# Patient Record
Sex: FEMALE | Race: WHITE | Employment: OTHER | URBAN - METROPOLITAN AREA
[De-identification: names, ages, dates, MRNs, and addresses within clinical notes are randomized per-mention and may not be internally consistent; named-entity substitution may affect disease eponyms.]

---

## 2017-03-23 ENCOUNTER — HOSPITAL ENCOUNTER (OUTPATIENT)
Dept: NUCLEAR MEDICINE | Age: 52
Discharge: HOME | End: 2017-03-23
Attending: INTERNAL MEDICINE | Admitting: INTERNAL MEDICINE

## 2017-03-23 DIAGNOSIS — R10.11 RIGHT UPPER QUADRANT PAIN: ICD-10-CM

## 2017-03-23 DIAGNOSIS — R10.11 ABDOMINAL PAIN, RIGHT UPPER QUADRANT: ICD-10-CM

## 2017-03-23 RX ADMIN — Medication 1 MILLICURIE: at 07:51

## 2017-04-27 ENCOUNTER — TELEPHONE (OUTPATIENT)
Dept: CARDIOTHORACIC SURGERY | Age: 52
End: 2017-04-27

## 2017-04-27 ENCOUNTER — HOSPITAL ENCOUNTER (OUTPATIENT)
Dept: OTHER | Age: 52
Discharge: HOME | End: 2017-04-27
Attending: PODIATRIST | Admitting: PODIATRIST

## 2017-04-27 DIAGNOSIS — R52 PAIN: ICD-10-CM

## 2017-08-01 ENCOUNTER — HOSPITAL ENCOUNTER (OUTPATIENT)
Dept: OTHER | Age: 52
Discharge: HOME | End: 2017-08-01
Attending: PSYCHIATRY & NEUROLOGY | Admitting: PSYCHIATRY & NEUROLOGY

## 2017-08-01 LAB
A/G RATIO: 1 (ref 1.1–2.2)
ALBUMIN SERPL-MCNC: 3.7 G/DL (ref 3.4–5)
ALP BLD-CCNC: 102 U/L (ref 40–129)
ALT SERPL-CCNC: 9 U/L (ref 10–40)
AMPHETAMINE SCREEN, URINE: NORMAL
ANION GAP SERPL CALCULATED.3IONS-SCNC: 17 MMOL/L (ref 3–16)
AST SERPL-CCNC: 13 U/L (ref 15–37)
BARBITURATE SCREEN URINE: NORMAL
BASOPHILS ABSOLUTE: 0 K/UL (ref 0–0.2)
BASOPHILS RELATIVE PERCENT: 0.5 %
BENZODIAZEPINE SCREEN, URINE: NORMAL
BILIRUB SERPL-MCNC: 0.3 MG/DL (ref 0–1)
BUN BLDV-MCNC: 6 MG/DL (ref 7–20)
CALCIUM SERPL-MCNC: 9.7 MG/DL (ref 8.3–10.6)
CANNABINOID SCREEN URINE: NORMAL
CHLORIDE BLD-SCNC: 93 MMOL/L (ref 99–110)
CHOLESTEROL, TOTAL: 266 MG/DL (ref 0–199)
CO2: 28 MMOL/L (ref 21–32)
COCAINE METABOLITE SCREEN URINE: NORMAL
CREAT SERPL-MCNC: <0.5 MG/DL (ref 0.6–1.1)
EOSINOPHILS ABSOLUTE: 0.3 K/UL (ref 0–0.6)
EOSINOPHILS RELATIVE PERCENT: 3.3 %
GFR AFRICAN AMERICAN: >60
GFR NON-AFRICAN AMERICAN: >60
GLOBULIN: 3.7 G/DL
GLUCOSE BLD-MCNC: 352 MG/DL (ref 70–99)
HCT VFR BLD CALC: 48.2 % (ref 36–48)
HDLC SERPL-MCNC: 41 MG/DL (ref 40–60)
HEMOGLOBIN: 16.1 G/DL (ref 12–16)
LDL CHOLESTEROL CALCULATED: ABNORMAL MG/DL
LDL CHOLESTEROL DIRECT: 187 MG/DL
LYMPHOCYTES ABSOLUTE: 2.3 K/UL (ref 1–5.1)
LYMPHOCYTES RELATIVE PERCENT: 27.9 %
Lab: NORMAL
MCH RBC QN AUTO: 28.2 PG (ref 26–34)
MCHC RBC AUTO-ENTMCNC: 33.3 G/DL (ref 31–36)
MCV RBC AUTO: 84.5 FL (ref 80–100)
METHADONE SCREEN, URINE: NORMAL
MONOCYTES ABSOLUTE: 0.5 K/UL (ref 0–1.3)
MONOCYTES RELATIVE PERCENT: 5.8 %
NEUTROPHILS ABSOLUTE: 5.1 K/UL (ref 1.7–7.7)
NEUTROPHILS RELATIVE PERCENT: 62.5 %
OPIATE SCREEN URINE: NORMAL
OXYCODONE URINE: NORMAL
PDW BLD-RTO: 14.1 % (ref 12.4–15.4)
PH UA: 6.5
PHENCYCLIDINE SCREEN URINE: NORMAL
PLATELET # BLD: 289 K/UL (ref 135–450)
PMV BLD AUTO: 8.4 FL (ref 5–10.5)
POTASSIUM SERPL-SCNC: 3.8 MMOL/L (ref 3.5–5.1)
PROPOXYPHENE SCREEN: NORMAL
RBC # BLD: 5.7 M/UL (ref 4–5.2)
SODIUM BLD-SCNC: 138 MMOL/L (ref 136–145)
TOTAL PROTEIN: 7.4 G/DL (ref 6.4–8.2)
TRIGL SERPL-MCNC: 305 MG/DL (ref 0–150)
TSH SERPL DL<=0.05 MIU/L-ACNC: 0.11 UIU/ML (ref 0.27–4.2)
VLDLC SERPL CALC-MCNC: ABNORMAL MG/DL
WBC # BLD: 8.1 K/UL (ref 4–11)

## 2017-08-02 LAB
ESTIMATED AVERAGE GLUCOSE: 251.8 MG/DL
HBA1C MFR BLD: 10.4 %

## 2017-12-04 ENCOUNTER — OFFICE VISIT (OUTPATIENT)
Dept: ORTHOPEDIC SURGERY | Age: 52
End: 2017-12-04

## 2017-12-04 VITALS — HEIGHT: 64 IN | BODY MASS INDEX: 34.66 KG/M2 | WEIGHT: 203.04 LBS

## 2017-12-04 DIAGNOSIS — M54.12 CERVICAL RADICULITIS: ICD-10-CM

## 2017-12-04 DIAGNOSIS — M25.511 RIGHT SHOULDER PAIN, UNSPECIFIED CHRONICITY: Primary | ICD-10-CM

## 2017-12-04 DIAGNOSIS — M75.101 TEAR OF RIGHT ROTATOR CUFF, UNSPECIFIED TEAR EXTENT: ICD-10-CM

## 2017-12-04 PROCEDURE — 73030 X-RAY EXAM OF SHOULDER: CPT | Performed by: PHYSICIAN ASSISTANT

## 2017-12-04 PROCEDURE — 99243 OFF/OP CNSLTJ NEW/EST LOW 30: CPT | Performed by: PHYSICIAN ASSISTANT

## 2017-12-04 PROCEDURE — G8417 CALC BMI ABV UP PARAM F/U: HCPCS | Performed by: PHYSICIAN ASSISTANT

## 2017-12-04 PROCEDURE — 3017F COLORECTAL CA SCREEN DOC REV: CPT | Performed by: PHYSICIAN ASSISTANT

## 2017-12-04 PROCEDURE — G8427 DOCREV CUR MEDS BY ELIG CLIN: HCPCS | Performed by: PHYSICIAN ASSISTANT

## 2017-12-04 PROCEDURE — G8484 FLU IMMUNIZE NO ADMIN: HCPCS | Performed by: PHYSICIAN ASSISTANT

## 2017-12-04 PROCEDURE — 3014F SCREEN MAMMO DOC REV: CPT | Performed by: PHYSICIAN ASSISTANT

## 2017-12-04 RX ORDER — BUPRENORPHINE HYDROCHLORIDE AND NALOXONE HYDROCHLORIDE DIHYDRATE 8; 2 MG/1; MG/1
TABLET SUBLINGUAL
Refills: 0 | Status: ON HOLD | COMMUNITY
Start: 2017-11-30 | End: 2018-05-22 | Stop reason: SDDI

## 2017-12-04 NOTE — PROGRESS NOTES
REFERRING PHYSICIAN: Tierra Juárez PA-C    CHIEF COMPLAINT:  Right shoulder pain     HISTORY OF PRESENT ILLNESS:  Nic Ornelas is a 57-year-old right-hand-dominant female who presents today at the request of Tierra Juárez PA-C for evaluation consultation of her right shoulder. She has had 6 months of atraumatic right shoulder discomfort. She reports having pain at night is affecting her sleep, to the point that she has been sleeping in a chair. She also reports mild numbness and tingling into the 4th and 5th digits, mostly at night. She is treated this with heat and no medications. She is currently on Suboxone 2 times daily due to pain medication and alcohol addiction. She is in a pain management clinic for this therapy. 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 She has a history of a left rotator cuff repair with Dr. Renea Olmstead MD 2016. REVIEW OF SYSTEMS:  Pertinent items are noted in the HPI. Review of systems was reviewed from Patient History Form dated on December 4, 2017 and available in the patient's chart under the Media tab. PHYSICAL EXAMINATION: Inspection of the right shoulder reveals warm, dry, intact skin. There is no adenopathy. The distal neurovascular exam is grossly intact. There is tenderness about the acromioclavicular mild tenderness in the posterior and anterior aspects the shoulder. Range of motion reveals 90° of comfortable active forward elevation, 120° of total forward elevation. She has difficulty position her hand behind her head. She has 5° of external rotation with her arm at her side. She has 90° of shoulder abduction. She has 90° of external rotation and 10° of internal rotation with her arm maximally abducted. She has 5 minus out of 5 strength in forward elevation and external rotation.   She is discomfort and weakness with a Whipple's test.  She has discomfort and weakness with belly press test. She has discomfort and weakness with resisted supination. Sedation about the lateral aspect of the shoulder in an axillary nerve distribution pattern is mildly diminished. She is able to actively contract her deltoid. Examination of the contralateral shoulder reveals no atrophy or deformity. The skin is warm and dry. Range of motion is within normal limits. There is no focal tenderness with palpation. Provocative SLAP, biceps tension, apprehension AC joint or rotator cuff tests are negative. Strength is graded 5/5 in all muscle groups. The distal neurovascular exam is grossly intact. Cervical spine: The skin is warm and dry. There is no swelling, warmth, or erythema. Range of motion is limited in right and left rotation and right and left lateral flexion. Cervical range of motion is not produce any right shoulder symptoms. She reports increased radicular symptoms to her 4th and 5th digits with cervical range of motion. There is no paraspinal or spinous process tenderness. Spurling's sign is negative and did not produce shoulder pain. The distal neurovascular exam reveals decreased sensation throughout the hand. Hand  strength is slightly diminished when compared bilaterally. Finger abduction, extension, and thumb extension are all slightly diminished when compared bilaterally. Tinel sign at the elbow also increases radicular symptoms to the 4th and 5th digits. Tinel sign at the wrist is negative. X-RAYS: 4 views of the right shoulder were obtained in the office and reviewed today. The glenohumeral joint is well maintained. The acromioclavicular joint is well maintained. The acromiohumeral interval is diminished. There are changes about the undersurface of the lateral acromion and greater tuberosity. There is a type II acromion visualized in the outlet view. Lung fields are clear with normal pulmonary markings. ASSESSMENT:    1. Right shoulder possible rotator cuff tear  2.   Cervical radiculitis  3. Chronic Suboxone use    PLAN:  I had a detailed discussion with Vicky Moss and her  regarding diagnosis and treatment options. I recommended an MRI to evaluate the integrity of the rotator cuff. I also recommended a cervical spine evaluation by nonoperative spine specialist.  I will see her back for reevaluation following the MRI and cervical spine evaluation. She and her  are in agreement with this plan.     Patient seen and examined by Ira Ontiveros PA-C and Dr. Neela Camarena MD

## 2017-12-12 ENCOUNTER — HOSPITAL ENCOUNTER (OUTPATIENT)
Dept: MAMMOGRAPHY | Age: 52
Discharge: HOME | End: 2017-12-12
Attending: PHYSICIAN ASSISTANT | Admitting: PHYSICIAN ASSISTANT

## 2017-12-12 ENCOUNTER — TELEPHONE (OUTPATIENT)
Dept: ORTHOPEDIC SURGERY | Age: 52
End: 2017-12-12

## 2017-12-12 DIAGNOSIS — M75.101 TEAR OF RIGHT ROTATOR CUFF, UNSPECIFIED TEAR EXTENT: Primary | ICD-10-CM

## 2017-12-12 DIAGNOSIS — Z12.31 VISIT FOR SCREENING MAMMOGRAM: ICD-10-CM

## 2018-01-02 ENCOUNTER — TELEPHONE (OUTPATIENT)
Dept: ORTHOPEDIC SURGERY | Age: 53
End: 2018-01-02

## 2018-01-02 NOTE — TELEPHONE ENCOUNTER
We had her in therapy for 4-6 weeks due to MRI denial for lack of conservative care. We need her to make an appt with [facility] after 2/1/2018 to hopefully get MRI approved. Does she need more orders for PT at [facility]?

## 2018-05-23 PROBLEM — R55 POSTURAL DIZZINESS WITH PRESYNCOPE: Status: ACTIVE | Noted: 2018-05-23

## 2018-05-23 PROBLEM — R42 POSTURAL DIZZINESS WITH PRESYNCOPE: Status: ACTIVE | Noted: 2018-05-23

## 2018-05-23 PROBLEM — E86.9 VOLUME DEPLETION: Status: ACTIVE | Noted: 2018-05-23

## 2018-05-23 PROBLEM — E83.42 HYPOMAGNESEMIA: Status: ACTIVE | Noted: 2018-05-23

## 2018-05-23 PROBLEM — R73.9 ACUTE HYPERGLYCEMIA: Status: ACTIVE | Noted: 2018-05-23

## 2018-05-23 PROBLEM — I95.9 HYPOTENSION: Status: ACTIVE | Noted: 2018-05-23

## 2018-05-23 PROBLEM — R57.9 SHOCK (HCC): Status: ACTIVE | Noted: 2018-05-23

## 2018-05-23 PROBLEM — E87.8 HYPOCHLOREMIA: Status: ACTIVE | Noted: 2018-05-23

## 2018-05-23 PROBLEM — I50.9 CHF (CONGESTIVE HEART FAILURE) (HCC): Chronic | Status: ACTIVE | Noted: 2018-05-23

## 2018-05-23 PROBLEM — N17.9 AKI (ACUTE KIDNEY INJURY) (HCC): Status: ACTIVE | Noted: 2018-05-23

## 2018-05-23 PROBLEM — D64.9 NORMOCYTIC ANEMIA: Status: ACTIVE | Noted: 2018-05-23

## 2018-05-23 PROBLEM — E87.1 HYPONATREMIA: Status: ACTIVE | Noted: 2018-05-23

## 2018-05-24 PROBLEM — R57.0 CARDIOGENIC SHOCK (HCC): Status: ACTIVE | Noted: 2018-05-23

## 2018-05-24 PROBLEM — I34.0 MITRAL REGURGITATION: Chronic | Status: ACTIVE | Noted: 2018-05-24

## 2018-05-26 PROBLEM — I95.81 POSTPROCEDURAL HYPOTENSION: Status: ACTIVE | Noted: 2018-05-26

## 2018-05-27 PROBLEM — I95.81 POSTPROCEDURAL HYPOTENSION: Status: RESOLVED | Noted: 2018-05-26 | Resolved: 2018-05-27

## 2018-05-27 PROBLEM — I50.9 CHF (CONGESTIVE HEART FAILURE) (HCC): Chronic | Status: RESOLVED | Noted: 2018-05-23 | Resolved: 2018-05-27

## 2018-05-27 PROBLEM — N17.9 AKI (ACUTE KIDNEY INJURY) (HCC): Status: RESOLVED | Noted: 2018-05-23 | Resolved: 2018-05-27

## 2018-05-27 PROBLEM — E87.1 HYPONATREMIA: Status: RESOLVED | Noted: 2018-05-23 | Resolved: 2018-05-27

## 2018-05-27 PROBLEM — R57.9 SHOCK (HCC): Status: RESOLVED | Noted: 2018-05-23 | Resolved: 2018-05-27

## 2018-05-29 ENCOUNTER — TELEPHONE (OUTPATIENT)
Dept: CARDIOLOGY | Age: 53
End: 2018-05-29

## 2018-05-30 ENCOUNTER — TELEPHONE (OUTPATIENT)
Dept: CARDIOLOGY UNIT | Age: 53
End: 2018-05-30

## 2018-05-31 ENCOUNTER — TELEPHONE (OUTPATIENT)
Dept: TELEMETRY | Age: 53
End: 2018-05-31

## 2018-06-12 LAB
B-TYPE NATRIURETIC PEPTIDE: 801 PG/ML
BASOPHILS ABSOLUTE: 0.02 /ΜL
BASOPHILS RELATIVE PERCENT: 0 %
BUN BLDV-MCNC: 33 MG/DL
CALCIUM SERPL-MCNC: 8.2 MG/DL
CHLORIDE BLD-SCNC: 95 MMOL/L
CO2: 17 MMOL/L
CREAT SERPL-MCNC: 1.6 MG/DL
EOSINOPHILS ABSOLUTE: 0.22 /ΜL
EOSINOPHILS RELATIVE PERCENT: 3 %
GFR CALCULATED: 34
GLUCOSE BLD-MCNC: 126 MG/DL
HCT VFR BLD CALC: 32.2 % (ref 36–46)
HEMOGLOBIN: 10.3 G/DL (ref 12–16)
LYMPHOCYTES ABSOLUTE: 1.47 /ΜL
LYMPHOCYTES RELATIVE PERCENT: 17 %
MCH RBC QN AUTO: 28 PG
MCHC RBC AUTO-ENTMCNC: 32 G/DL
MCV RBC AUTO: 88 FL
MONOCYTES ABSOLUTE: 0.53 /ΜL
MONOCYTES RELATIVE PERCENT: 6 %
NEUTROPHILS ABSOLUTE: 6.49 /ΜL
NEUTROPHILS RELATIVE PERCENT: 74 %
PDW BLD-RTO: 15.4 %
PLATELET # BLD: 383 K/ΜL
PMV BLD AUTO: ABNORMAL FL
POTASSIUM SERPL-SCNC: 3.3 MMOL/L
RBC # BLD: 3.68 10^6/ΜL
SODIUM BLD-SCNC: 139 MMOL/L
TROPONIN: <0.017
WBC # BLD: 8.7 10^3/ML

## 2018-06-18 ENCOUNTER — OFFICE VISIT (OUTPATIENT)
Dept: CARDIOLOGY CLINIC | Age: 53
End: 2018-06-18

## 2018-06-18 VITALS
WEIGHT: 209.2 LBS | HEIGHT: 62 IN | BODY MASS INDEX: 38.5 KG/M2 | HEART RATE: 94 BPM | SYSTOLIC BLOOD PRESSURE: 130 MMHG | DIASTOLIC BLOOD PRESSURE: 76 MMHG | OXYGEN SATURATION: 96 %

## 2018-06-18 DIAGNOSIS — I50.21 ACUTE SYSTOLIC CHF (CONGESTIVE HEART FAILURE), NYHA CLASS 4 (HCC): ICD-10-CM

## 2018-06-18 DIAGNOSIS — I25.5 ISCHEMIC CARDIOMYOPATHY: ICD-10-CM

## 2018-06-18 DIAGNOSIS — E78.2 MIXED HYPERLIPIDEMIA: Chronic | ICD-10-CM

## 2018-06-18 DIAGNOSIS — I25.10 CORONARY ARTERY DISEASE INVOLVING NATIVE CORONARY ARTERY OF NATIVE HEART WITHOUT ANGINA PECTORIS: Primary | ICD-10-CM

## 2018-06-18 DIAGNOSIS — I73.9 PVD (PERIPHERAL VASCULAR DISEASE) (HCC): ICD-10-CM

## 2018-06-18 DIAGNOSIS — I34.0 MITRAL VALVE INSUFFICIENCY, UNSPECIFIED ETIOLOGY: Chronic | ICD-10-CM

## 2018-06-18 PROCEDURE — G8598 ASA/ANTIPLAT THER USED: HCPCS | Performed by: NURSE PRACTITIONER

## 2018-06-18 PROCEDURE — 3017F COLORECTAL CA SCREEN DOC REV: CPT | Performed by: NURSE PRACTITIONER

## 2018-06-18 PROCEDURE — 1111F DSCHRG MED/CURRENT MED MERGE: CPT | Performed by: NURSE PRACTITIONER

## 2018-06-18 PROCEDURE — G8427 DOCREV CUR MEDS BY ELIG CLIN: HCPCS | Performed by: NURSE PRACTITIONER

## 2018-06-18 PROCEDURE — G8417 CALC BMI ABV UP PARAM F/U: HCPCS | Performed by: NURSE PRACTITIONER

## 2018-06-18 PROCEDURE — 99214 OFFICE O/P EST MOD 30 MIN: CPT | Performed by: NURSE PRACTITIONER

## 2018-06-18 PROCEDURE — 4004F PT TOBACCO SCREEN RCVD TLK: CPT | Performed by: NURSE PRACTITIONER

## 2018-06-18 RX ORDER — CARVEDILOL 6.25 MG/1
6.25 TABLET ORAL 2 TIMES DAILY
COMMUNITY
Start: 2018-06-08 | End: 2018-06-18 | Stop reason: SDUPTHER

## 2018-06-18 RX ORDER — FUROSEMIDE 80 MG
TABLET ORAL
Qty: 60 TABLET | Refills: 3 | Status: SHIPPED | OUTPATIENT
Start: 2018-06-18 | End: 2018-06-27 | Stop reason: SDUPTHER

## 2018-06-18 RX ORDER — CARVEDILOL 3.12 MG/1
3.12 TABLET ORAL 2 TIMES DAILY
Qty: 60 TABLET | Refills: 3 | Status: SHIPPED | OUTPATIENT
Start: 2018-06-18 | End: 2018-11-26 | Stop reason: SDUPTHER

## 2018-06-18 RX ORDER — LISINOPRIL 5 MG/1
5 TABLET ORAL DAILY
Qty: 30 TABLET | Refills: 3
Start: 2018-06-18 | End: 2018-06-27 | Stop reason: SDUPTHER

## 2018-06-25 ENCOUNTER — TELEPHONE (OUTPATIENT)
Dept: CARDIOLOGY CLINIC | Age: 53
End: 2018-06-25

## 2018-06-25 DIAGNOSIS — I50.21 ACUTE SYSTOLIC CHF (CONGESTIVE HEART FAILURE), NYHA CLASS 4 (HCC): ICD-10-CM

## 2018-06-25 DIAGNOSIS — I25.10 CORONARY ARTERY DISEASE INVOLVING NATIVE CORONARY ARTERY OF NATIVE HEART WITHOUT ANGINA PECTORIS: Primary | ICD-10-CM

## 2018-06-25 DIAGNOSIS — I25.5 ISCHEMIC CARDIOMYOPATHY: ICD-10-CM

## 2018-06-27 ENCOUNTER — HOSPITAL ENCOUNTER (OUTPATIENT)
Dept: OTHER | Age: 53
Discharge: HOME | End: 2018-06-30
Attending: NURSE PRACTITIONER | Admitting: NURSE PRACTITIONER

## 2018-06-27 ENCOUNTER — OFFICE VISIT (OUTPATIENT)
Dept: CARDIOLOGY CLINIC | Age: 53
End: 2018-06-27

## 2018-06-27 VITALS
HEART RATE: 86 BPM | BODY MASS INDEX: 38.28 KG/M2 | WEIGHT: 208 LBS | HEIGHT: 62 IN | OXYGEN SATURATION: 94 % | DIASTOLIC BLOOD PRESSURE: 50 MMHG | SYSTOLIC BLOOD PRESSURE: 80 MMHG

## 2018-06-27 DIAGNOSIS — I50.21 ACUTE SYSTOLIC CHF (CONGESTIVE HEART FAILURE), NYHA CLASS 4 (HCC): Primary | ICD-10-CM

## 2018-06-27 DIAGNOSIS — I34.0 MITRAL VALVE INSUFFICIENCY, UNSPECIFIED ETIOLOGY: ICD-10-CM

## 2018-06-27 DIAGNOSIS — I25.10 CORONARY ARTERY DISEASE INVOLVING NATIVE CORONARY ARTERY OF NATIVE HEART WITHOUT ANGINA PECTORIS: ICD-10-CM

## 2018-06-27 DIAGNOSIS — I50.21 ACUTE SYSTOLIC CHF (CONGESTIVE HEART FAILURE), NYHA CLASS 4 (HCC): ICD-10-CM

## 2018-06-27 DIAGNOSIS — I25.5 ISCHEMIC CARDIOMYOPATHY: ICD-10-CM

## 2018-06-27 DIAGNOSIS — E78.2 MIXED HYPERLIPIDEMIA: ICD-10-CM

## 2018-06-27 DIAGNOSIS — I34.0 MITRAL VALVE INSUFFICIENCY, UNSPECIFIED ETIOLOGY: Chronic | ICD-10-CM

## 2018-06-27 DIAGNOSIS — I73.9 PVD (PERIPHERAL VASCULAR DISEASE) (HCC): ICD-10-CM

## 2018-06-27 PROCEDURE — G8598 ASA/ANTIPLAT THER USED: HCPCS | Performed by: NURSE PRACTITIONER

## 2018-06-27 PROCEDURE — G8417 CALC BMI ABV UP PARAM F/U: HCPCS | Performed by: NURSE PRACTITIONER

## 2018-06-27 PROCEDURE — G8427 DOCREV CUR MEDS BY ELIG CLIN: HCPCS | Performed by: NURSE PRACTITIONER

## 2018-06-27 PROCEDURE — 4004F PT TOBACCO SCREEN RCVD TLK: CPT | Performed by: NURSE PRACTITIONER

## 2018-06-27 PROCEDURE — 3017F COLORECTAL CA SCREEN DOC REV: CPT | Performed by: NURSE PRACTITIONER

## 2018-06-27 PROCEDURE — 99214 OFFICE O/P EST MOD 30 MIN: CPT | Performed by: NURSE PRACTITIONER

## 2018-06-27 RX ORDER — LISINOPRIL 5 MG/1
5 TABLET ORAL DAILY
Qty: 30 TABLET | Refills: 3 | Status: SHIPPED | OUTPATIENT
Start: 2018-06-27 | End: 2018-07-18

## 2018-06-27 RX ORDER — FUROSEMIDE 80 MG
40 TABLET ORAL DAILY PRN
Qty: 60 TABLET | Refills: 3
Start: 2018-06-27 | End: 2018-09-05 | Stop reason: SDUPTHER

## 2018-06-27 RX ORDER — SPIRONOLACTONE 25 MG/1
25 TABLET ORAL DAILY
Qty: 30 TABLET | Refills: 5 | Status: SHIPPED | OUTPATIENT
Start: 2018-06-27 | End: 2018-09-05 | Stop reason: SDUPTHER

## 2018-06-28 ENCOUNTER — TELEPHONE (OUTPATIENT)
Dept: CARDIOLOGY CLINIC | Age: 53
End: 2018-06-28

## 2018-06-28 LAB
ANION GAP SERPL CALCULATED.3IONS-SCNC: 15 MMOL/L (ref 3–16)
BUN BLDV-MCNC: 46 MG/DL (ref 7–20)
CALCIUM SERPL-MCNC: 9.3 MG/DL (ref 8.3–10.6)
CHLORIDE BLD-SCNC: 92 MMOL/L (ref 99–110)
CO2: 30 MMOL/L (ref 21–32)
CREAT SERPL-MCNC: 1.7 MG/DL (ref 0.6–1.1)
GFR AFRICAN AMERICAN: 38
GFR NON-AFRICAN AMERICAN: 31
GLUCOSE BLD-MCNC: 126 MG/DL (ref 70–99)
POTASSIUM SERPL-SCNC: 4 MMOL/L (ref 3.5–5.1)
SODIUM BLD-SCNC: 137 MMOL/L (ref 136–145)

## 2018-06-28 NOTE — TELEPHONE ENCOUNTER
----- Message from TRAY Quiñonez CNP sent at 6/28/2018 11:47 AM EDT -----  Kidney function better. Let's keep with the plan to take Lasix 40 mg if weight > 208 lbs. Stay off the lisinopril for now. Repeat blood test (BMP) in 2 weeks (she has a standing order at [place]).    Thanks, [employer]

## 2018-07-01 ENCOUNTER — HOSPITAL ENCOUNTER (OUTPATIENT)
Dept: OTHER | Age: 53
Discharge: HOME | End: 2018-07-01
Attending: NURSE PRACTITIONER | Admitting: NURSE PRACTITIONER

## 2018-07-01 DIAGNOSIS — I25.10 CORONARY ARTERY DISEASE INVOLVING NATIVE CORONARY ARTERY OF NATIVE HEART WITHOUT ANGINA PECTORIS: ICD-10-CM

## 2018-07-01 DIAGNOSIS — I25.5 ISCHEMIC CARDIOMYOPATHY: ICD-10-CM

## 2018-07-01 DIAGNOSIS — I50.21 ACUTE SYSTOLIC CHF (CONGESTIVE HEART FAILURE), NYHA CLASS 4 (HCC): ICD-10-CM

## 2018-07-01 DIAGNOSIS — I34.0 MITRAL VALVE INSUFFICIENCY, UNSPECIFIED ETIOLOGY: Chronic | ICD-10-CM

## 2018-07-05 ENCOUNTER — HOSPITAL ENCOUNTER (OUTPATIENT)
Dept: OTHER | Age: 53
Discharge: HOME | End: 2018-07-05
Attending: PHYSICIAN ASSISTANT | Admitting: PHYSICIAN ASSISTANT

## 2018-07-05 DIAGNOSIS — R06.02 SOB (SHORTNESS OF BREATH): ICD-10-CM

## 2018-07-05 LAB — PRO-BNP: 2890 PG/ML (ref 0–124)

## 2018-07-12 ENCOUNTER — TELEPHONE (OUTPATIENT)
Dept: CARDIOLOGY CLINIC | Age: 53
End: 2018-07-12

## 2018-07-12 NOTE — TELEPHONE ENCOUNTER
CARDIAC CLEARANCE REQUEST    What type of procedure are you having: Colonoscopy    Are you taking any blood thinners: ASA and Brilinta    When is your procedure scheduled for: 7.25.18    What physician is performing your procedure:    Pt needs to know if she can stop the Brilinta 5 days prior to procedure. Last seen npdd on 6.27.18  Assessment:    1. Coronary artery disease involving native coronary artery of native heart without angina pectoris    2. Ischemic cardiomyopathy    3. Acute systolic CHF (congestive heart failure), NYHA class 4 (HCC)    4. Mitral valve insufficiency, unspecified etiology    5. Mixed hyperlipidemia    6. PVD (peripheral vascular disease) ([address]             Plan:   1. Stop the lisinopril completely for now  2. Stay on the carvedilol (Coreg) 3.125 mg twice daily with meals  3. Take furosemide (Lasix) 40 mg tomorrow then daily as needed if weight > 208 lbs  4. Decrease the spironolactone 25 mg to just 1 pill daily  5. Have blood work today  6.  Follow up with me on 7/18/18 as scheduled

## 2018-07-16 ENCOUNTER — TELEPHONE (OUTPATIENT)
Dept: CARDIOLOGY CLINIC | Age: 53
End: 2018-07-16

## 2018-07-16 ENCOUNTER — HOSPITAL ENCOUNTER (OUTPATIENT)
Age: 53
Discharge: HOME | End: 2018-07-16
Payer: COMMERCIAL

## 2018-07-16 DIAGNOSIS — I50.21 ACUTE SYSTOLIC CHF (CONGESTIVE HEART FAILURE), NYHA CLASS 4 (HCC): ICD-10-CM

## 2018-07-16 DIAGNOSIS — I25.5 ISCHEMIC CARDIOMYOPATHY: ICD-10-CM

## 2018-07-16 DIAGNOSIS — I34.0 MITRAL VALVE INSUFFICIENCY, UNSPECIFIED ETIOLOGY: Chronic | ICD-10-CM

## 2018-07-16 DIAGNOSIS — I25.10 CORONARY ARTERY DISEASE INVOLVING NATIVE CORONARY ARTERY OF NATIVE HEART WITHOUT ANGINA PECTORIS: ICD-10-CM

## 2018-07-16 LAB
ANION GAP SERPL CALCULATED.3IONS-SCNC: 16 MMOL/L (ref 3–16)
BUN BLDV-MCNC: 28 MG/DL (ref 7–20)
CALCIUM SERPL-MCNC: 10 MG/DL (ref 8.3–10.6)
CHLORIDE BLD-SCNC: 89 MMOL/L (ref 99–110)
CO2: 31 MMOL/L (ref 21–32)
CREAT SERPL-MCNC: 1.3 MG/DL (ref 0.6–1.1)
GFR AFRICAN AMERICAN: 52
GFR NON-AFRICAN AMERICAN: 43
GLUCOSE BLD-MCNC: 218 MG/DL (ref 70–99)
POTASSIUM SERPL-SCNC: 3.7 MMOL/L (ref 3.5–5.1)
SODIUM BLD-SCNC: 136 MMOL/L (ref 136–145)

## 2018-07-16 PROCEDURE — 80048 BASIC METABOLIC PNL TOTAL CA: CPT

## 2018-07-16 PROCEDURE — 36415 COLL VENOUS BLD VENIPUNCTURE: CPT

## 2018-07-16 NOTE — TELEPHONE ENCOUNTER
Spoke with Kendra Velasquez, relayed lab results per [employer]. Pt verbalized understanding. She states her scale must be broke weighed in at 197 lb today. She says she is actually about 206 lb. She says she has a cold still and has labored breathing.

## 2018-07-16 NOTE — TELEPHONE ENCOUNTER
----- Message from TRAY Pinedo CNP sent at 7/16/2018  1:50 PM EDT -----  Kidney function improved. Continue current diuretic regimen. Please find out how her weight and breathing are.    Thank you, Kaye Muller

## 2018-07-18 ENCOUNTER — OFFICE VISIT (OUTPATIENT)
Dept: CARDIOLOGY CLINIC | Age: 53
End: 2018-07-18

## 2018-07-18 VITALS
DIASTOLIC BLOOD PRESSURE: 62 MMHG | OXYGEN SATURATION: 95 % | HEART RATE: 87 BPM | SYSTOLIC BLOOD PRESSURE: 114 MMHG | WEIGHT: 201.8 LBS | HEIGHT: 62 IN | BODY MASS INDEX: 37.13 KG/M2

## 2018-07-18 DIAGNOSIS — I73.9 PVD (PERIPHERAL VASCULAR DISEASE) (HCC): ICD-10-CM

## 2018-07-18 DIAGNOSIS — I25.5 ISCHEMIC CARDIOMYOPATHY: ICD-10-CM

## 2018-07-18 DIAGNOSIS — E78.2 MIXED HYPERLIPIDEMIA: ICD-10-CM

## 2018-07-18 DIAGNOSIS — I50.22 CHRONIC SYSTOLIC CONGESTIVE HEART FAILURE (HCC): ICD-10-CM

## 2018-07-18 DIAGNOSIS — I25.10 CORONARY ARTERY DISEASE INVOLVING NATIVE CORONARY ARTERY OF NATIVE HEART WITHOUT ANGINA PECTORIS: Primary | ICD-10-CM

## 2018-07-18 DIAGNOSIS — I34.0 MITRAL VALVE INSUFFICIENCY, UNSPECIFIED ETIOLOGY: ICD-10-CM

## 2018-07-18 PROCEDURE — G8427 DOCREV CUR MEDS BY ELIG CLIN: HCPCS | Performed by: NURSE PRACTITIONER

## 2018-07-18 PROCEDURE — G8598 ASA/ANTIPLAT THER USED: HCPCS | Performed by: NURSE PRACTITIONER

## 2018-07-18 PROCEDURE — G8417 CALC BMI ABV UP PARAM F/U: HCPCS | Performed by: NURSE PRACTITIONER

## 2018-07-18 PROCEDURE — 4004F PT TOBACCO SCREEN RCVD TLK: CPT | Performed by: NURSE PRACTITIONER

## 2018-07-18 PROCEDURE — 3017F COLORECTAL CA SCREEN DOC REV: CPT | Performed by: NURSE PRACTITIONER

## 2018-07-18 PROCEDURE — 99214 OFFICE O/P EST MOD 30 MIN: CPT | Performed by: NURSE PRACTITIONER

## 2018-07-18 NOTE — PROGRESS NOTES
Tonsillectomy; Cholecystectomy; tumor excision; Upper gastrointestinal endoscopy (4/25/2013); Upper gastrointestinal endoscopy (12/10/2015); Upper gastrointestinal endoscopy (01/06/2017); Arterial bypass surgry (Left, 01/06/2016); Cardiac catheterization (03/27/2018); Coronary angioplasty (01/03/2018); Coronary angioplasty with stent (03/2018); and Rotator cuff repair (Left, 04/22/2016). Social History:   reports that she has been smoking. She has a 15.00 pack-year smoking history. She has never used smokeless tobacco. She reports that she does not drink alcohol or use drugs. Family History:   Family History   Problem Relation Age of Onset    Heart Disease Maternal Grandmother     Cancer Mother         lung and brain cancer    Cancer Maternal Aunt         lung and brain cancer       Home Medications:  Prior to Admission medications    Medication Sig Start Date End Date Taking? Authorizing Provider   spironolactone (ALDACTONE) 25 MG tablet Take 1 tablet by mouth daily 6/27/18  Yes TRAY Grey CNP   lisinopril (PRINIVIL;ZESTRIL) 5 MG tablet Take 1 tablet by mouth daily HOLD 6/27/18 6/27/18  Yes TRAY Lux CNP   furosemide (LASIX) 80 MG tablet Take 0.5 tablets by mouth daily as needed (weight > 208 lbs) 80 mg once daily plus an additional 80 mg if weight > 208 lbs. 6/27/18  Yes TRAY Grey CNP   carvedilol (COREG) 3.125 MG tablet Take 1 tablet by mouth 2 times daily 6/18/18  Yes TRAY Lux CNP   atorvastatin (LIPITOR) 80 MG tablet Take 80 mg by mouth nightly 5/2/18  Yes Historical Provider, MD   busPIRone (BUSPAR) 30 MG tablet Take 30 mg by mouth 3 times daily 4/2/18  Yes Historical Provider, MD   metFORMIN (GLUCOPHAGE-XR) 500 MG extended release tablet Take 1,000 mg by mouth 2 times daily 4/2/18  Yes Historical Provider, MD   buprenorphine-naloxone (SUBOXONE) 8-2 MG SUBL SL tablet Place 2 tablets under the tongue daily. Annel Rondon Historical Provider, MD doxepin (SINEQUAN) 50 MG capsule Take 50 mg by mouth nightly   Yes Historical Provider, MD   insulin glargine (LANTUS) 100 UNIT/ML injection vial Inject 25 Units into the skin 2 times daily   Yes Historical Provider, MD   nitroGLYCERIN (NITROSTAT) 0.4 MG SL tablet Place 0.4 mg under the tongue every 5 minutes as needed for Chest pain (times 3) up to max of 3 total doses. If no relief after 1 dose, call 911. Yes Historical Provider, MD   ticagrelor (BRILINTA) 90 MG TABS tablet Take 90 mg by mouth 2 times daily   Yes Historical Provider, MD   aspirin EC 81 MG EC tablet Take 1 tablet by mouth daily 1/8/16  Yes Bethanie Leon MD   pantoprazole (PROTONIX) 40 MG tablet Take 1 tablet by mouth daily  Patient taking differently: Take 40 mg by mouth 2 times daily  12/11/15  Yes Devon Paez MD   ARIPiprazole (ABILIFY) 10 MG tablet Take 5 mg by mouth daily    Yes Historical Provider, MD   lamoTRIgine (LAMICTAL) 150 MG tablet Take 150 mg by mouth 2 times daily    Yes Historical Provider, MD        Allergies:  Patient has no known allergies. Review of Systems:   · Constitutional: there has been no unanticipated weight loss. There's been no change in energy level, sleep pattern, or activity level. · Eyes: No visual changes or diplopia. No scleral icterus. · ENT: No Headaches, hearing loss or vertigo. No mouth sores or sore throat. · Cardiovascular: Reviewed in HPI  · Respiratory: No cough or wheezing, no sputum production. No hematemesis. · Gastrointestinal: No abdominal pain, appetite loss, blood in stools. No change in bowel or bladder habits. · Genitourinary: No dysuria, trouble voiding, or hematuria. · Musculoskeletal:  No gait disturbance, weakness or joint complaints. · Integumentary: No rash or pruritis. · Neurological: No headache, diplopia, change in muscle strength, numbness or tingling. No change in gait, balance, coordination, mood, affect, memory, mentation, behavior.   · Psychiatric: No Date     07/16/2018     06/27/2018     06/25/2018    K 3.7 07/16/2018    K 4.0 06/27/2018    K 3.8 06/25/2018    K 4.2 05/27/2018    K 4.2 05/24/2018    CL 89 07/16/2018    CL 92 06/27/2018    CL 84 06/25/2018    CO2 31 07/16/2018    CO2 30 06/27/2018    CO2 28 06/25/2018    PHOS 3.1 05/25/2018    PHOS 3.0 05/23/2018    PHOS 3.3 05/23/2018    BUN 28 07/16/2018    BUN 46 06/27/2018    BUN 52 06/25/2018    CREATININE 1.3 07/16/2018    CREATININE 1.7 06/27/2018    CREATININE 2.4 06/25/2018     BNP:   Lab Results   Component Value Date    PROBNP 2,890 07/05/2018    PROBNP 4,131 05/23/2018        Cardiac Imaging:  Echo: 5/23/18:    Ejection fraction is visually estimated to be 30-35 %. There is akinesis of the apex and inferior walls. Left ventricular size is mildly increased. Moderate mitral regurgitation. Moderate amount plaque is noted in the aorta. The left atrium is mildly dilated. 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 A bubble study was performed and fails to show evidence of shunting. Procedure performed: Transesophageal echocardiogram 5/25/18:    Findings:  Reduced left ventricular function with ejection fraction estimated at about 35% with apical and inferior akinesis    The cardiac valves show moderate mitral regurgitation  There is no evidence for an intracardiac shunt or intracardiac thrombus. Cardiac cath 5/25/18:   Procedure Performed:  1. Coronary angiogram  2. Left heart cath  3. Left ventriculogram  4. Right heart cath   Findings:  1. Patent LAD and CIRC stents              ~mild CAD  2. Reduced LVEF 30% with anterior and apical akinesis  3. Moderate pulmonary hyperension   Conclusion/plan of care:  1. Medical management    Assessment:    1. Coronary artery disease involving native coronary artery of native heart without angina pectoris    2. Ischemic cardiomyopathy    3. Mitral valve insufficiency, unspecified etiology    4.  Chronic systolic congestive heart failure ([facility] [address]    5. Mixed hyperlipidemia    6. PVD (peripheral vascular disease) ([facility] [address]          Plan:   1. Take Lasix 40 mg if weight > 201 lbs  2. Stay off the lisinopril for now  3. Continue same doses of spironolactone and carvedilol  4. Continue the aspirin and Brilinta (cannot stop, s/p PCI Jan 2018 and March 2018)  5. Okay to have dental work but unable to stop the aspirin and Brilinta if needed for tooth extraction  6. Repeat blood work in 2 weeks  7. Schedule arterial study of legs to check circulation   8. Follow up with me in 3 weeks    I appreciate the opportunity of cooperating in the care of this individual.    Lester Peters CNP, 7/18/2018, 11:16 AM    QUALITY MEASURES  1. Tobacco Cessation Counseling: Yes  2. Retake of BP if >140/90:   NA  3. Documentation to PCP/referring for new patient:  Sent to PCP at close of office visit  4. CAD patient on anti-platelet: Yes  5. CAD patient on STATIN therapy:  Yes  6.  Patient with CHF and aFib on anticoagulation:  NA

## 2018-07-18 NOTE — PATIENT INSTRUCTIONS
1. Take Lasix 40 mg if weight > 201 lbs  2. Stay off the lisinopril for now  3. Continue same doses of spironolactone and carvedilol  4. Continue the aspirin and Brilinta  5. Okay to have dental work but unable to stop the aspirin and Brilinta if needed for tooth extraction  6. Repeat blood work in 2 weeks  7. Schedule arterial study of legs to check circulation   8.  Follow up with me in 3 weeks

## 2018-08-02 ENCOUNTER — HOSPITAL ENCOUNTER (OUTPATIENT)
Age: 53
Discharge: HOME | End: 2018-08-02
Payer: COMMERCIAL

## 2018-08-02 ENCOUNTER — HOSPITAL ENCOUNTER (OUTPATIENT)
Dept: VASCULAR LAB | Age: 53
Discharge: HOME | End: 2018-08-02
Payer: COMMERCIAL

## 2018-08-02 ENCOUNTER — TELEPHONE (OUTPATIENT)
Dept: CARDIOLOGY CLINIC | Age: 53
End: 2018-08-02

## 2018-08-02 DIAGNOSIS — I34.0 MITRAL VALVE INSUFFICIENCY, UNSPECIFIED ETIOLOGY: Chronic | ICD-10-CM

## 2018-08-02 DIAGNOSIS — I25.10 CORONARY ARTERY DISEASE INVOLVING NATIVE CORONARY ARTERY OF NATIVE HEART WITHOUT ANGINA PECTORIS: ICD-10-CM

## 2018-08-02 DIAGNOSIS — I50.21 ACUTE SYSTOLIC CHF (CONGESTIVE HEART FAILURE), NYHA CLASS 4 (HCC): ICD-10-CM

## 2018-08-02 DIAGNOSIS — I25.5 ISCHEMIC CARDIOMYOPATHY: ICD-10-CM

## 2018-08-02 LAB
ANION GAP SERPL CALCULATED.3IONS-SCNC: 14 MMOL/L (ref 3–16)
BUN BLDV-MCNC: 21 MG/DL (ref 7–20)
CALCIUM SERPL-MCNC: 9.2 MG/DL (ref 8.3–10.6)
CHLORIDE BLD-SCNC: 91 MMOL/L (ref 99–110)
CO2: 29 MMOL/L (ref 21–32)
CREAT SERPL-MCNC: 1.1 MG/DL (ref 0.6–1.1)
GFR AFRICAN AMERICAN: >60
GFR NON-AFRICAN AMERICAN: 52
GLUCOSE BLD-MCNC: 132 MG/DL (ref 70–99)
POTASSIUM SERPL-SCNC: 3.7 MMOL/L (ref 3.5–5.1)
SODIUM BLD-SCNC: 134 MMOL/L (ref 136–145)

## 2018-08-02 PROCEDURE — 36415 COLL VENOUS BLD VENIPUNCTURE: CPT

## 2018-08-02 PROCEDURE — 80048 BASIC METABOLIC PNL TOTAL CA: CPT

## 2018-08-02 PROCEDURE — 93922 UPR/L XTREMITY ART 2 LEVELS: CPT

## 2018-08-02 PROCEDURE — 93925 LOWER EXTREMITY STUDY: CPT

## 2018-08-02 PROCEDURE — 93923 UPR/LXTR ART STDY 3+ LVLS: CPT

## 2018-08-02 NOTE — TELEPHONE ENCOUNTER
----- Message from TRAY Brady CNP sent at 8/2/2018  3:33 PM EDT -----  Kidney function better. Continue current regimen. Follow up with me next week as scheduled. The ZACH's of legs are still pending.    Thanks, [employer]

## 2018-08-06 ENCOUNTER — TELEPHONE (OUTPATIENT)
Dept: CARDIOLOGY CLINIC | Age: 53
End: 2018-08-06

## 2018-08-06 NOTE — TELEPHONE ENCOUNTER
----- Message from Karyn Favre, APRN - CNP sent at 8/6/2018  6:58 AM EDT -----  No larger vessel blockage of legs. We can talk about the smaller vessels in office Wednesday.    Thanks, [employer]

## 2018-08-08 ENCOUNTER — OFFICE VISIT (OUTPATIENT)
Dept: CARDIOLOGY CLINIC | Age: 53
End: 2018-08-08

## 2018-08-08 VITALS
SYSTOLIC BLOOD PRESSURE: 118 MMHG | OXYGEN SATURATION: 94 % | DIASTOLIC BLOOD PRESSURE: 68 MMHG | WEIGHT: 207 LBS | HEIGHT: 64 IN | BODY MASS INDEX: 35.34 KG/M2 | HEART RATE: 83 BPM

## 2018-08-08 DIAGNOSIS — I25.10 CORONARY ARTERY DISEASE INVOLVING NATIVE CORONARY ARTERY OF NATIVE HEART WITHOUT ANGINA PECTORIS: Primary | ICD-10-CM

## 2018-08-08 DIAGNOSIS — I25.5 ISCHEMIC CARDIOMYOPATHY: ICD-10-CM

## 2018-08-08 DIAGNOSIS — I73.9 PVD (PERIPHERAL VASCULAR DISEASE) (HCC): ICD-10-CM

## 2018-08-08 DIAGNOSIS — E78.2 MIXED HYPERLIPIDEMIA: ICD-10-CM

## 2018-08-08 DIAGNOSIS — I34.0 MITRAL VALVE INSUFFICIENCY, UNSPECIFIED ETIOLOGY: ICD-10-CM

## 2018-08-08 DIAGNOSIS — I50.22 CHRONIC SYSTOLIC CONGESTIVE HEART FAILURE (HCC): ICD-10-CM

## 2018-08-08 PROCEDURE — 99214 OFFICE O/P EST MOD 30 MIN: CPT | Performed by: NURSE PRACTITIONER

## 2018-08-08 PROCEDURE — G8598 ASA/ANTIPLAT THER USED: HCPCS | Performed by: NURSE PRACTITIONER

## 2018-08-08 PROCEDURE — 3017F COLORECTAL CA SCREEN DOC REV: CPT | Performed by: NURSE PRACTITIONER

## 2018-08-08 PROCEDURE — G8417 CALC BMI ABV UP PARAM F/U: HCPCS | Performed by: NURSE PRACTITIONER

## 2018-08-08 PROCEDURE — G8427 DOCREV CUR MEDS BY ELIG CLIN: HCPCS | Performed by: NURSE PRACTITIONER

## 2018-08-08 PROCEDURE — 4004F PT TOBACCO SCREEN RCVD TLK: CPT | Performed by: NURSE PRACTITIONER

## 2018-08-08 RX ORDER — GUAIFENESIN 400 MG/1
400 TABLET ORAL 4 TIMES DAILY PRN
Qty: 120 TABLET | Refills: 0 | Status: SHIPPED | OUTPATIENT
Start: 2018-08-08 | End: 2018-09-07

## 2018-08-08 RX ORDER — LISINOPRIL 5 MG/1
5 TABLET ORAL NIGHTLY
Qty: 30 TABLET | Refills: 3 | Status: SHIPPED | OUTPATIENT
Start: 2018-08-08 | End: 2018-09-05 | Stop reason: SINTOL

## 2018-08-08 RX ORDER — ALBUTEROL SULFATE 2.5 MG/3ML
3 SOLUTION RESPIRATORY (INHALATION)
Status: ON HOLD | COMMUNITY
Start: 2018-07-05 | End: 2019-05-17

## 2018-08-08 NOTE — PATIENT INSTRUCTIONS
1. Continue to use the furosemide (Lasix) 40 mg daily as needed for weight > 201 lbs  2. Start lisinopril 5 mg nightly  3. Use Mucinex 400 mg every 6 hrs as needed for cough  4. Repeat blood work in 2 weeks  5. Try to walk in 5 minute increments several times per day  6.  Follow up with me in 3-4 weeks

## 2018-08-08 NOTE — PROGRESS NOTES
[address]   Cardiac Evaluation    Primary Care Doctor:  Denae Odell [place]    Chief Complaint   Patient presents with    Coronary Artery Disease    Congestive Heart Failure    Hypertension        History of Present Illness:   I had the pleasure of seeing Neelam Noyola in follow up for  CAD, MR, sCHF, ICM, HLD as well as PAD. At last visit we changed the Lasix to 40 mg prn weight > 201 lbs. The ZACH's showed calf vessel disease. Her weight at home today was 206 lbs. She has been taking the Lasix 40 mg about every other day. Her walking is limited due to hip pain, more so than calf pain. She has been wheezing, on and off for 3-4 weeks, + cough, post nasal drip. She took 1 round of ATBX. Not taking anything OTC. Recent blood work on 8/2/18 reviewed and stable/ improved. Neelam Nooyla describes symptoms including dyspnea, fatigue, edema but denies chest pain, palpitations, orthopnea, PND, early saiety, syncope. NYHA:   III  ACC/ AHA Stage:    C    Past Medical History:   has a past medical history of Anxiety and depression; Diabetes mellitus ([address] GERD (gastroesophageal reflux disease); Hyperlipidemia; Hypertension; Peripheral neuropathy; Peripheral vascular disease ([address] Reflux; Sleep apnea; and Wears glasses. Surgical History:   has a past surgical history that includes Tonsillectomy; Cholecystectomy; tumor excision; Upper gastrointestinal endoscopy (4/25/2013); Upper gastrointestinal endoscopy (12/10/2015); Upper gastrointestinal endoscopy (01/06/2017); Arterial bypass surgry (Left, 01/06/2016); Cardiac catheterization (03/27/2018); Coronary angioplasty (01/03/2018); Coronary angioplasty with stent (03/2018); and Rotator cuff repair (Left, 04/22/2016). Social History:   reports that she has been smoking. She has a 15.00 pack-year smoking history. She has never used smokeless tobacco. She reports that she does not drink alcohol or use drugs.    Family History:   Family History   Problem Relation Age of Onset    Heart Disease Maternal Grandmother     Cancer Mother         lung and brain cancer    Cancer Maternal Aunt         lung and brain cancer       Home Medications:  Prior to Admission medications    Medication Sig Start Date End Date Taking? Authorizing Provider   spironolactone (ALDACTONE) 25 MG tablet Take 1 tablet by mouth daily 6/27/18   TRAY Cat - CNP   furosemide (LASIX) 80 MG tablet Take 0.5 tablets by mouth daily as needed (weight > 208 lbs) 80 mg once daily plus an additional 80 mg if weight > 208 lbs. 6/27/18   Bejni Shaper, APRN - CNP   carvedilol (COREG) 3.125 MG tablet Take 1 tablet by mouth 2 times daily 6/18/18   TRAY Cat - CNP   atorvastatin (LIPITOR) 80 MG tablet Take 80 mg by mouth nightly 5/2/18   Historical Provider, MD   busPIRone (BUSPAR) 30 MG tablet Take 30 mg by mouth 3 times daily 4/2/18   Historical Provider, MD   metFORMIN (GLUCOPHAGE-XR) 500 MG extended release tablet Take 1,000 mg by mouth 2 times daily 4/2/18   Historical Provider, MD   buprenorphine-naloxone (SUBOXONE) 8-2 MG SUBL SL tablet Place 2 tablets under the tongue daily. Katherin Herrera Historical Provider, MD   doxepin (SINEQUAN) 50 MG capsule Take 50 mg by mouth nightly    Historical Provider, MD   insulin glargine (LANTUS) 100 UNIT/ML injection vial Inject 25 Units into the skin 2 times daily    Historical Provider, MD   nitroGLYCERIN (NITROSTAT) 0.4 MG SL tablet Place 0.4 mg under the tongue every 5 minutes as needed for Chest pain (times 3) up to max of 3 total doses. If no relief after 1 dose, call 911.     Historical Provider, MD   ticagrelor (BRILINTA) 90 MG TABS tablet Take 90 mg by mouth 2 times daily    Historical Provider, MD   aspirin EC 81 MG EC tablet Take 1 tablet by mouth daily 1/8/16   Jane Miller MD   pantoprazole (PROTONIX) 40 MG tablet Take 1 tablet by mouth daily  Patient taking differently: Take 40 mg by mouth 2 times daily  12/11/15 Kyree Melissa MD   ARIPiprazole (ABILIFY) 10 MG tablet Take 5 mg by mouth daily     Historical Provider, MD   lamoTRIgine (LAMICTAL) 150 MG tablet Take 150 mg by mouth 2 times daily     Historical Provider, MD        Allergies:  Patient has no known allergies. Review of Systems:   · Constitutional: there has been no unanticipated weight loss. There's been no change in energy level, sleep pattern, or activity level. · Eyes: No visual changes or diplopia. No scleral icterus. · ENT: No Headaches, hearing loss or vertigo. No mouth sores or sore throat. · Cardiovascular: Reviewed in HPI  · Respiratory: No cough or wheezing, no sputum production. No hematemesis. · Gastrointestinal: No abdominal pain, appetite loss, blood in stools. No change in bowel or bladder habits. · Genitourinary: No dysuria, trouble voiding, or hematuria. · Musculoskeletal:  No gait disturbance, weakness or joint complaints. · Integumentary: No rash or pruritis. · Neurological: No headache, diplopia, change in muscle strength, numbness or tingling. No change in gait, balance, coordination, mood, affect, memory, mentation, behavior. · Psychiatric: No anxiety, no depression. · Endocrine: No malaise, fatigue or temperature intolerance. No excessive thirst, fluid intake, or urination. No tremor. · Hematologic/Lymphatic: No abnormal bruising or bleeding, blood clots or swollen lymph nodes. · Allergic/Immunologic: No nasal congestion or hives.     Physical Examination:    Vitals:    08/08/18 0847   BP: 118/68   Pulse: 83   SpO2: 94%   Weight: 207 lb (93.9 kg)   Height: 5' 4\" (1.626 m)        Constitutional and General Appearance: Warm and dry, no apparent distress, normal coloration  HEENT:  Normocephalic, atraumatic  Respiratory:  · Normal excursion and expansion without use of accessory muscles  · Resp Auscultation: Inspiratory and expiratory wheezes bilat  Cardiovascular:  · The apical impulses not displaced  · Heart tones are crisp and normal  · JVP 8-9 cm H2O  · Regular rate and rhythm, normal S1S2, no m/g/r  · Peripheral pulses are symmetrical and full  · There is no clubbing, cyanosis of the extremities. · Trace BLE edema L>R  · Pedal Pulses: 2+ and equal   Abdomen:  · No masses or tenderness  · Liver/Spleen: No Abnormalities Noted  Neurological/Psychiatric:  · Alert and oriented in all spheres  · Moves all extremities well  · Exhibits normal gait balance and coordination  · No abnormalities of mood, affect, memory, mentation, or behavior are noted    Lab Data:    CBC:   Lab Results   Component Value Date    WBC 8.7 06/12/2018    WBC 6.4 05/27/2018    WBC 14.9 05/24/2018    RBC 3.68 06/12/2018    RBC 3.77 05/27/2018    RBC 4.30 05/24/2018    HGB 10.3 06/12/2018    HGB 10.5 05/27/2018    HGB 11.9 05/24/2018    HCT 32.2 06/12/2018    HCT 32.0 05/27/2018    HCT 36.5 05/24/2018    MCV 88 06/12/2018    MCV 85.0 05/27/2018    MCV 84.8 05/24/2018    RDW 15.4 06/12/2018    RDW 15.4 05/27/2018    RDW 15.4 05/24/2018     06/12/2018     05/27/2018     05/24/2018     BMP:  Lab Results   Component Value Date     08/02/2018     07/16/2018     06/27/2018    K 3.7 08/02/2018    K 3.7 07/16/2018    K 4.0 06/27/2018    K 4.2 05/27/2018    K 4.2 05/24/2018    CL 91 08/02/2018    CL 89 07/16/2018    CL 92 06/27/2018    CO2 29 08/02/2018    CO2 31 07/16/2018    CO2 30 06/27/2018    PHOS 3.1 05/25/2018    PHOS 3.0 05/23/2018    PHOS 3.3 05/23/2018    BUN 21 08/02/2018    BUN 28 07/16/2018    BUN 46 06/27/2018    CREATININE 1.1 08/02/2018    CREATININE 1.3 07/16/2018    CREATININE 1.7 06/27/2018     BNP:   Lab Results   Component Value Date    PROBNP 2,890 07/05/2018    PROBNP 4,131 05/23/2018        Cardiac Imaging:  [facility] 3/26/18 [facility] [id number] CCx and [id number] CCx. Echo: 5/23/18:    Ejection fraction is visually estimated to be 30-35 %.    There is akinesis of the apex and inferior walls. Left ventricular size is mildly increased. Moderate mitral regurgitation. Moderate amount plaque is noted in the aorta. The left atrium is mildly dilated. 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 A bubble study was performed and fails to show evidence of shunting. Procedure performed: Transesophageal echocardiogram 5/25/18:    Findings:  Reduced left ventricular function with ejection fraction estimated at about 35% with apical and inferior akinesis    The cardiac valves show moderate mitral regurgitation  There is no evidence for an intracardiac shunt or intracardiac thrombus. Cardiac cath 5/25/18:   Procedure Performed:  1. Coronary angiogram  2. Left heart cath  3. Left ventriculogram  4. Right heart cath   Findings:  1. Patent LAD and CIRC stents              ~mild CAD  2. Reduced LVEF 30% with anterior and apical akinesis  3. Moderate pulmonary hyperension   Conclusion/plan of care:  1. Medical management    Assessment:    1. Coronary artery disease involving native coronary artery of native heart without angina pectoris    2. Ischemic cardiomyopathy    3. Mitral valve insufficiency, unspecified etiology    4. Chronic systolic congestive heart failure ([address]    5. Mixed hyperlipidemia    6. PVD (peripheral vascular disease) ([address]          Plan:   1. Continue to use the furosemide (Lasix) 40 mg daily as needed for weight > 201 lbs  2. Start lisinopril 5 mg nightly  3. Use Mucinex 400 mg every 6 hrs as needed for cough  4. Repeat blood work in 2 weeks  5. Try to walk in 5 minute increments several times per day (PAD)  6. Follow up with me in 3-4 weeks      I appreciate the opportunity of cooperating in the care of this individual.    Lizzie Nova CNP, 8/8/2018, 9:04 AM    QUALITY MEASURES  1. Tobacco Cessation Counseling: Yes  2. Retake of BP if >140/90:   NA  3. Documentation to PCP/referring for new patient:  Sent to PCP at close of office visit  4.  CAD patient on

## 2018-08-16 ENCOUNTER — TELEPHONE (OUTPATIENT)
Dept: CARDIOLOGY CLINIC | Age: 53
End: 2018-08-16

## 2018-09-05 ENCOUNTER — OFFICE VISIT (OUTPATIENT)
Dept: CARDIOLOGY CLINIC | Age: 53
End: 2018-09-05

## 2018-09-05 ENCOUNTER — HOSPITAL ENCOUNTER (OUTPATIENT)
Age: 53
Discharge: HOME | End: 2018-09-05
Payer: COMMERCIAL

## 2018-09-05 VITALS
WEIGHT: 211 LBS | SYSTOLIC BLOOD PRESSURE: 124 MMHG | HEART RATE: 81 BPM | OXYGEN SATURATION: 95 % | HEIGHT: 64 IN | BODY MASS INDEX: 36.02 KG/M2 | DIASTOLIC BLOOD PRESSURE: 68 MMHG

## 2018-09-05 DIAGNOSIS — I25.10 CORONARY ARTERY DISEASE INVOLVING NATIVE CORONARY ARTERY OF NATIVE HEART WITHOUT ANGINA PECTORIS: ICD-10-CM

## 2018-09-05 DIAGNOSIS — I25.5 ISCHEMIC CARDIOMYOPATHY: ICD-10-CM

## 2018-09-05 DIAGNOSIS — E78.2 MIXED HYPERLIPIDEMIA: ICD-10-CM

## 2018-09-05 DIAGNOSIS — I34.0 MITRAL VALVE INSUFFICIENCY, UNSPECIFIED ETIOLOGY: ICD-10-CM

## 2018-09-05 DIAGNOSIS — I73.9 PVD (PERIPHERAL VASCULAR DISEASE) (HCC): ICD-10-CM

## 2018-09-05 DIAGNOSIS — I34.0 MITRAL VALVE INSUFFICIENCY, UNSPECIFIED ETIOLOGY: Chronic | ICD-10-CM

## 2018-09-05 DIAGNOSIS — I50.21 ACUTE SYSTOLIC CHF (CONGESTIVE HEART FAILURE), NYHA CLASS 4 (HCC): ICD-10-CM

## 2018-09-05 DIAGNOSIS — I50.22 CHRONIC SYSTOLIC CONGESTIVE HEART FAILURE (HCC): ICD-10-CM

## 2018-09-05 DIAGNOSIS — I25.10 CORONARY ARTERY DISEASE INVOLVING NATIVE CORONARY ARTERY OF NATIVE HEART WITHOUT ANGINA PECTORIS: Primary | ICD-10-CM

## 2018-09-05 LAB
ANION GAP SERPL CALCULATED.3IONS-SCNC: 15 MMOL/L (ref 3–16)
BUN BLDV-MCNC: 28 MG/DL (ref 7–20)
CALCIUM SERPL-MCNC: 9.1 MG/DL (ref 8.3–10.6)
CHLORIDE BLD-SCNC: 92 MMOL/L (ref 99–110)
CO2: 30 MMOL/L (ref 21–32)
CREAT SERPL-MCNC: 1.2 MG/DL (ref 0.6–1.1)
GFR AFRICAN AMERICAN: 56
GFR NON-AFRICAN AMERICAN: 47
GLUCOSE BLD-MCNC: 178 MG/DL (ref 70–99)
POTASSIUM SERPL-SCNC: 3 MMOL/L (ref 3.5–5.1)
SODIUM BLD-SCNC: 137 MMOL/L (ref 136–145)

## 2018-09-05 PROCEDURE — 80048 BASIC METABOLIC PNL TOTAL CA: CPT

## 2018-09-05 PROCEDURE — G8598 ASA/ANTIPLAT THER USED: HCPCS | Performed by: NURSE PRACTITIONER

## 2018-09-05 PROCEDURE — G8427 DOCREV CUR MEDS BY ELIG CLIN: HCPCS | Performed by: NURSE PRACTITIONER

## 2018-09-05 PROCEDURE — 99213 OFFICE O/P EST LOW 20 MIN: CPT | Performed by: NURSE PRACTITIONER

## 2018-09-05 PROCEDURE — 36415 COLL VENOUS BLD VENIPUNCTURE: CPT

## 2018-09-05 PROCEDURE — 3017F COLORECTAL CA SCREEN DOC REV: CPT | Performed by: NURSE PRACTITIONER

## 2018-09-05 PROCEDURE — G8417 CALC BMI ABV UP PARAM F/U: HCPCS | Performed by: NURSE PRACTITIONER

## 2018-09-05 PROCEDURE — 4004F PT TOBACCO SCREEN RCVD TLK: CPT | Performed by: NURSE PRACTITIONER

## 2018-09-05 RX ORDER — FUROSEMIDE 40 MG/1
40 TABLET ORAL DAILY
Qty: 60 TABLET | Refills: 5 | Status: SHIPPED | OUTPATIENT
Start: 2018-09-05 | End: 2018-10-03 | Stop reason: SDUPTHER

## 2018-09-05 RX ORDER — SPIRONOLACTONE 25 MG/1
25 TABLET ORAL DAILY
Qty: 30 TABLET | Refills: 5 | Status: SHIPPED | OUTPATIENT
Start: 2018-09-05 | End: 2018-10-18 | Stop reason: SDUPTHER

## 2018-09-05 NOTE — PATIENT INSTRUCTIONS
1. Restart the carvedilol (Coreg) 3.125 mg twice daily with meals  2. Restart the spironolactone 25 mg once daily in AM  3. Stay off the lisinopril completely  4. Continue the furosemide (lasix) 40 mg daily plus an additional 40 mg if needed (swelling, weight gain)  5. Repeat blood work in 1 month  6.  Follow up with me in 1 month

## 2018-09-05 NOTE — PROGRESS NOTES
[address]   Cardiac Evaluation    Primary Care Doctor:  Deena Espana    Chief Complaint   Patient presents with    1 Month Follow-Up    Congestive Heart Failure    Coronary Artery Disease    Hypertension    Discuss Labs     bmp 08/02/2018    Medication Adjustment    Edema     left leg stays swollen    Fatigue     same        History of Present Illness:   I had the pleasure of seeing Priscilla Sauer in follow up for  CAD, MR, sCHF, ICM, HLD as well as PAD. At last visit we started lisinopril 5 mg for ICM/ CHF. Her BP dropped to 66'N systolic and was symptomatic. Lisinopril and Coreg were held for 1 day then Coreg resumed. Instructed to stop lisinopril completely. Her weight at home today was 208 lbs today, up 2 lbs from last visit. She feels this is body weight, not water weight. Her breathing has been stable. She was able to walk the store 2 days ago where she previously had to use motorized cart. Her exertion is limited equally by shortness of breath and left leg pain. The left leg has remained swollen. Blood work today reviewed and stable (excepting K of 3.0). Sleep is poor, unchanged. She reports being out of the spironolactone and also not taking the carvedilol since telephone call with hypotension. Priscilla Sauer describes symptoms including dyspnea, fatigue, edema but denies chest pain, palpitations, orthopnea, PND, early saiety, syncope. NYHA:   II  ACC/ AHA Stage:    C    Past Medical History:   has a past medical history of Anxiety and depression; Diabetes mellitus ([address] GERD (gastroesophageal reflux disease); Hyperlipidemia; Hypertension; Peripheral neuropathy; Peripheral vascular disease ([address] Reflux; Sleep apnea; and Wears glasses. Surgical History:   has a past surgical history that includes Tonsillectomy; Cholecystectomy; tumor excision; Upper gastrointestinal endoscopy (4/25/2013); Upper gastrointestinal endoscopy (12/10/2015);  Upper headache, diplopia, change in muscle strength, numbness or tingling. No change in gait, balance, coordination, mood, affect, memory, mentation, behavior. · Psychiatric: No anxiety, no depression. · Endocrine: No malaise, fatigue or temperature intolerance. No excessive thirst, fluid intake, or urination. No tremor. · Hematologic/Lymphatic: No abnormal bruising or bleeding, blood clots or swollen lymph nodes. · Allergic/Immunologic: No nasal congestion or hives. Physical Examination:    Vitals:    09/05/18 0930   BP: 124/68   Pulse: 81   SpO2: 95%   Weight: 211 lb (95.7 kg)   Height: 5' 4\" (1.626 m)        Constitutional and General Appearance: Warm and dry, no apparent distress, normal coloration  HEENT:  Normocephalic, atraumatic  Respiratory:  · Normal excursion and expansion without use of accessory muscles  · Resp Auscultation: Inspiratory and expiratory wheezes throughout  Cardiovascular:  · The apical impulses not displaced  · Heart tones are crisp and normal  · JVP 8-9 cm H2O, + HJR  · Regular rate and rhythm, normal S1S2, no m/g/r  · Peripheral pulses are symmetrical and full  · There is no clubbing, cyanosis of the extremities.   · 1+ LLE edema, none RLE  · Pedal Pulses: 2+ and equal   Abdomen:  · No masses or tenderness  · Liver/Spleen: No Abnormalities Noted  Neurological/Psychiatric:  · Alert and oriented in all spheres  · Moves all extremities well  · Exhibits normal gait balance and coordination  · No abnormalities of mood, affect, memory, mentation, or behavior are noted    Lab Data:    CBC:   Lab Results   Component Value Date    WBC 8.7 06/12/2018    WBC 6.4 05/27/2018    WBC 14.9 05/24/2018    RBC 3.68 06/12/2018    RBC 3.77 05/27/2018    RBC 4.30 05/24/2018    HGB 10.3 06/12/2018    HGB 10.5 05/27/2018    HGB 11.9 05/24/2018    HCT 32.2 06/12/2018    HCT 32.0 05/27/2018    HCT 36.5 05/24/2018    MCV 88 06/12/2018    MCV 85.0 05/27/2018    MCV 84.8 05/24/2018    RDW 15.4 06/12/2018    RDW

## 2018-09-17 ENCOUNTER — TELEPHONE (OUTPATIENT)
Dept: CARDIOLOGY CLINIC | Age: 53
End: 2018-09-17

## 2018-10-03 ENCOUNTER — HOSPITAL ENCOUNTER (OUTPATIENT)
Age: 53
Discharge: HOME | End: 2018-10-03
Payer: COMMERCIAL

## 2018-10-03 ENCOUNTER — TELEPHONE (OUTPATIENT)
Dept: CARDIOLOGY CLINIC | Age: 53
End: 2018-10-03

## 2018-10-03 ENCOUNTER — OFFICE VISIT (OUTPATIENT)
Dept: CARDIOLOGY CLINIC | Age: 53
End: 2018-10-03
Payer: COMMERCIAL

## 2018-10-03 VITALS
DIASTOLIC BLOOD PRESSURE: 62 MMHG | SYSTOLIC BLOOD PRESSURE: 114 MMHG | HEIGHT: 64 IN | BODY MASS INDEX: 37.22 KG/M2 | HEART RATE: 88 BPM | WEIGHT: 218 LBS | OXYGEN SATURATION: 94 %

## 2018-10-03 DIAGNOSIS — E78.2 MIXED HYPERLIPIDEMIA: ICD-10-CM

## 2018-10-03 DIAGNOSIS — I50.21 ACUTE SYSTOLIC CHF (CONGESTIVE HEART FAILURE), NYHA CLASS 4 (HCC): ICD-10-CM

## 2018-10-03 DIAGNOSIS — I25.5 ISCHEMIC CARDIOMYOPATHY: ICD-10-CM

## 2018-10-03 DIAGNOSIS — I34.0 MITRAL VALVE INSUFFICIENCY, UNSPECIFIED ETIOLOGY: Chronic | ICD-10-CM

## 2018-10-03 DIAGNOSIS — I50.22 CHRONIC SYSTOLIC CONGESTIVE HEART FAILURE (HCC): ICD-10-CM

## 2018-10-03 DIAGNOSIS — I73.9 PVD (PERIPHERAL VASCULAR DISEASE) (HCC): ICD-10-CM

## 2018-10-03 DIAGNOSIS — I25.10 CORONARY ARTERY DISEASE INVOLVING NATIVE CORONARY ARTERY OF NATIVE HEART WITHOUT ANGINA PECTORIS: ICD-10-CM

## 2018-10-03 DIAGNOSIS — I34.0 MITRAL VALVE INSUFFICIENCY, UNSPECIFIED ETIOLOGY: ICD-10-CM

## 2018-10-03 DIAGNOSIS — I25.10 CORONARY ARTERY DISEASE INVOLVING NATIVE CORONARY ARTERY OF NATIVE HEART WITHOUT ANGINA PECTORIS: Primary | ICD-10-CM

## 2018-10-03 LAB
ANION GAP SERPL CALCULATED.3IONS-SCNC: 13 MMOL/L (ref 3–16)
BUN BLDV-MCNC: 23 MG/DL (ref 7–20)
CALCIUM SERPL-MCNC: 9.5 MG/DL (ref 8.3–10.6)
CHLORIDE BLD-SCNC: 94 MMOL/L (ref 99–110)
CO2: 29 MMOL/L (ref 21–32)
CREAT SERPL-MCNC: 1.1 MG/DL (ref 0.6–1.1)
GFR AFRICAN AMERICAN: >60
GFR NON-AFRICAN AMERICAN: 52
GLUCOSE BLD-MCNC: 340 MG/DL (ref 70–99)
POTASSIUM SERPL-SCNC: 2.9 MMOL/L (ref 3.5–5.1)
SODIUM BLD-SCNC: 136 MMOL/L (ref 136–145)

## 2018-10-03 PROCEDURE — 80048 BASIC METABOLIC PNL TOTAL CA: CPT

## 2018-10-03 PROCEDURE — G8598 ASA/ANTIPLAT THER USED: HCPCS | Performed by: NURSE PRACTITIONER

## 2018-10-03 PROCEDURE — G8427 DOCREV CUR MEDS BY ELIG CLIN: HCPCS | Performed by: NURSE PRACTITIONER

## 2018-10-03 PROCEDURE — G8484 FLU IMMUNIZE NO ADMIN: HCPCS | Performed by: NURSE PRACTITIONER

## 2018-10-03 PROCEDURE — 4004F PT TOBACCO SCREEN RCVD TLK: CPT | Performed by: NURSE PRACTITIONER

## 2018-10-03 PROCEDURE — 99214 OFFICE O/P EST MOD 30 MIN: CPT | Performed by: NURSE PRACTITIONER

## 2018-10-03 PROCEDURE — 36415 COLL VENOUS BLD VENIPUNCTURE: CPT

## 2018-10-03 PROCEDURE — G8417 CALC BMI ABV UP PARAM F/U: HCPCS | Performed by: NURSE PRACTITIONER

## 2018-10-03 PROCEDURE — 3017F COLORECTAL CA SCREEN DOC REV: CPT | Performed by: NURSE PRACTITIONER

## 2018-10-03 RX ORDER — POTASSIUM CHLORIDE 20 MEQ/1
40 TABLET, EXTENDED RELEASE ORAL 2 TIMES DAILY
Qty: 120 TABLET | Refills: 3 | Status: SHIPPED | OUTPATIENT
Start: 2018-10-03 | End: 2019-02-06 | Stop reason: SDUPTHER

## 2018-10-03 RX ORDER — METOLAZONE 2.5 MG/1
TABLET ORAL
COMMUNITY
Start: 2018-08-22 | End: 2018-10-08 | Stop reason: SDUPTHER

## 2018-10-03 RX ORDER — FUROSEMIDE 40 MG/1
40 TABLET ORAL 2 TIMES DAILY
Qty: 60 TABLET | Refills: 5
Start: 2018-10-03 | End: 2018-11-28 | Stop reason: SDUPTHER

## 2018-10-03 NOTE — PROGRESS NOTES
[address]   Cardiac Evaluation    Primary Care Doctor:  Josh Amos [place]    Chief Complaint   Patient presents with    1 Month Follow-Up    Congestive Heart Failure    Coronary Artery Disease    Hypertension    Medication Adjustment    Discuss Labs     bmp 09/05/2018    Shortness of Breath    Edema     everywhere    Fatigue        History of Present Illness:   I had the pleasure of seeing Arjun Echeverria in follow up for  CAD, MR, sCHF, ICM, HLD as well as PAD. At last visit we restarted Coreg 3.125 mg bid, spironolactone 25mg daily, kept Lasix 40 mg daily plus 40 mg prn. She called in the interim with weight gain and shortness of breath. She had missed a dose of Lasix so was instructed to take the additional prn dose. Blood work this am notable for K 2.9, stable renal function. Her weight at home was up to 219 lbs this am from 208 lbs 1 month ago. Her legs are more swollen. She fell down 4 steps last week, hurt tailbone. She has been sleeping in chair, can't get in or out of bed. No cough. Appetite about the same. She has been taking Lasix 40 mg twice daily except just once yesterday. Arjun Echeverria describes symptoms including dyspnea, fatigue, edema but denies chest pain, palpitations, orthopnea, PND, early saiety, syncope. NYHA:   III  ACC/ AHA Stage:    C    Past Medical History:   has a past medical history of Anxiety and depression; Diabetes mellitus ([address] GERD (gastroesophageal reflux disease); Hyperlipidemia; Hypertension; Peripheral neuropathy; Peripheral vascular disease ([address] Reflux; Sleep apnea; and Wears glasses. Surgical History:   has a past surgical history that includes Tonsillectomy; Cholecystectomy; tumor excision; Upper gastrointestinal endoscopy (4/25/2013); Upper gastrointestinal endoscopy (12/10/2015); Upper gastrointestinal endoscopy (01/06/2017); Arterial bypass surgry (Left, 01/06/2016); Cardiac catheterization (03/27/2018);  Coronary MG SL tablet Place 0.4 mg under the tongue every 5 minutes as needed for Chest pain (times 3) up to max of 3 total doses. If no relief after 1 dose, call 911. Yes Historical Provider, MD   ticagrelor (BRILINTA) 90 MG TABS tablet Take 90 mg by mouth 2 times daily   Yes Historical Provider, MD   aspirin EC 81 MG EC tablet Take 1 tablet by mouth daily 1/8/16  Yes Rex Mohr MD   pantoprazole (PROTONIX) 40 MG tablet Take 1 tablet by mouth daily  Patient taking differently: Take 40 mg by mouth 2 times daily  12/11/15  Yes Pau Bernstein MD   ARIPiprazole (ABILIFY) 10 MG tablet Take 5 mg by mouth daily    Yes Historical Provider, MD   lamoTRIgine (LAMICTAL) 150 MG tablet Take 150 mg by mouth 2 times daily    Yes Historical Provider, MD        Allergies:  Lisinopril     Review of Systems:   · Constitutional: there has been no unanticipated weight loss. There's been no change in energy level, sleep pattern, or activity level. · Eyes: No visual changes or diplopia. No scleral icterus. · ENT: No Headaches, hearing loss or vertigo. No mouth sores or sore throat. · Cardiovascular: Reviewed in HPI  · Respiratory: No cough or wheezing, no sputum production. No hematemesis. · Gastrointestinal: No abdominal pain, appetite loss, blood in stools. No change in bowel or bladder habits. · Genitourinary: No dysuria, trouble voiding, or hematuria. · Musculoskeletal:  No gait disturbance, weakness or joint complaints. · Integumentary: No rash or pruritis. · Neurological: No headache, diplopia, change in muscle strength, numbness or tingling. No change in gait, balance, coordination, mood, affect, memory, mentation, behavior. · Psychiatric: No anxiety, no depression. · Endocrine: No malaise, fatigue or temperature intolerance. No excessive thirst, fluid intake, or urination. No tremor. · Hematologic/Lymphatic: No abnormal bruising or bleeding, blood clots or swollen lymph nodes.   · Allergic/Immunologic: No nasal

## 2018-10-04 ENCOUNTER — APPOINTMENT (OUTPATIENT)
Dept: GENERAL RADIOLOGY | Age: 53
End: 2018-10-04
Payer: COMMERCIAL

## 2018-10-04 ENCOUNTER — HOSPITAL ENCOUNTER (EMERGENCY)
Age: 53
Discharge: HOME | End: 2018-10-04
Attending: EMERGENCY MEDICINE | Admitting: EMERGENCY MEDICINE
Payer: COMMERCIAL

## 2018-10-04 VITALS
HEART RATE: 80 BPM | HEIGHT: 64 IN | RESPIRATION RATE: 20 BRPM | WEIGHT: 208 LBS | DIASTOLIC BLOOD PRESSURE: 75 MMHG | TEMPERATURE: 98.2 F | SYSTOLIC BLOOD PRESSURE: 121 MMHG | OXYGEN SATURATION: 95 % | BODY MASS INDEX: 35.51 KG/M2

## 2018-10-04 DIAGNOSIS — I50.22 CHRONIC SYSTOLIC CONGESTIVE HEART FAILURE (HCC): Primary | ICD-10-CM

## 2018-10-04 LAB
A/G RATIO: 1 (ref 1.1–2.2)
ALBUMIN SERPL-MCNC: 4 G/DL (ref 3.4–5)
ALP BLD-CCNC: 132 U/L (ref 40–129)
ALT SERPL-CCNC: 11 U/L (ref 10–40)
ANION GAP SERPL CALCULATED.3IONS-SCNC: 15 MMOL/L (ref 3–16)
AST SERPL-CCNC: 15 U/L (ref 15–37)
BASOPHILS ABSOLUTE: 0 K/UL (ref 0–0.2)
BASOPHILS RELATIVE PERCENT: 0.5 %
BILIRUB SERPL-MCNC: 0.4 MG/DL (ref 0–1)
BUN BLDV-MCNC: 24 MG/DL (ref 7–20)
CALCIUM SERPL-MCNC: 9.3 MG/DL (ref 8.3–10.6)
CHLORIDE BLD-SCNC: 91 MMOL/L (ref 99–110)
CO2: 34 MMOL/L (ref 21–32)
CREAT SERPL-MCNC: 1.2 MG/DL (ref 0.6–1.1)
EOSINOPHILS ABSOLUTE: 0.3 K/UL (ref 0–0.6)
EOSINOPHILS RELATIVE PERCENT: 2.9 %
GFR AFRICAN AMERICAN: 56
GFR NON-AFRICAN AMERICAN: 47
GLOBULIN: 4.1 G/DL
GLUCOSE BLD-MCNC: 358 MG/DL (ref 70–99)
HCT VFR BLD CALC: 39.6 % (ref 36–48)
HEMOGLOBIN: 13.1 G/DL (ref 12–16)
LYMPHOCYTES ABSOLUTE: 1.6 K/UL (ref 1–5.1)
LYMPHOCYTES RELATIVE PERCENT: 16.9 %
MCH RBC QN AUTO: 28.4 PG (ref 26–34)
MCHC RBC AUTO-ENTMCNC: 33 G/DL (ref 31–36)
MCV RBC AUTO: 86.1 FL (ref 80–100)
MONOCYTES ABSOLUTE: 0.4 K/UL (ref 0–1.3)
MONOCYTES RELATIVE PERCENT: 3.9 %
NEUTROPHILS ABSOLUTE: 7.2 K/UL (ref 1.7–7.7)
NEUTROPHILS RELATIVE PERCENT: 75.8 %
PDW BLD-RTO: 16.4 % (ref 12.4–15.4)
PLATELET # BLD: 287 K/UL (ref 135–450)
PMV BLD AUTO: 9.4 FL (ref 5–10.5)
POTASSIUM SERPL-SCNC: 3.5 MMOL/L (ref 3.5–5.1)
PRO-BNP: 2350 PG/ML (ref 0–124)
RBC # BLD: 4.6 M/UL (ref 4–5.2)
SODIUM BLD-SCNC: 140 MMOL/L (ref 136–145)
TOTAL PROTEIN: 8.1 G/DL (ref 6.4–8.2)
TROPONIN: <0.01 NG/ML
WBC # BLD: 9.4 K/UL (ref 4–11)

## 2018-10-04 PROCEDURE — 85025 COMPLETE CBC W/AUTO DIFF WBC: CPT

## 2018-10-04 PROCEDURE — 36415 COLL VENOUS BLD VENIPUNCTURE: CPT

## 2018-10-04 PROCEDURE — 83880 ASSAY OF NATRIURETIC PEPTIDE: CPT

## 2018-10-04 PROCEDURE — 93005 ELECTROCARDIOGRAM TRACING: CPT | Performed by: EMERGENCY MEDICINE

## 2018-10-04 PROCEDURE — 99285 EMERGENCY DEPT VISIT HI MDM: CPT

## 2018-10-04 PROCEDURE — 80053 COMPREHEN METABOLIC PANEL: CPT

## 2018-10-04 PROCEDURE — 71045 X-RAY EXAM CHEST 1 VIEW: CPT

## 2018-10-04 PROCEDURE — 6360000002 HC RX W HCPCS: Performed by: EMERGENCY MEDICINE

## 2018-10-04 PROCEDURE — 84484 ASSAY OF TROPONIN QUANT: CPT

## 2018-10-04 PROCEDURE — 6370000000 HC RX 637 (ALT 250 FOR IP): Performed by: EMERGENCY MEDICINE

## 2018-10-04 PROCEDURE — 96374 THER/PROPH/DIAG INJ IV PUSH: CPT

## 2018-10-04 RX ORDER — IPRATROPIUM BROMIDE AND ALBUTEROL SULFATE 2.5; .5 MG/3ML; MG/3ML
1 SOLUTION RESPIRATORY (INHALATION) ONCE
Status: COMPLETED | OUTPATIENT
Start: 2018-10-04 | End: 2018-10-04

## 2018-10-04 RX ORDER — FUROSEMIDE 10 MG/ML
20 INJECTION INTRAMUSCULAR; INTRAVENOUS ONCE
Status: COMPLETED | OUTPATIENT
Start: 2018-10-04 | End: 2018-10-04

## 2018-10-04 RX ADMIN — FUROSEMIDE 20 MG: 10 INJECTION, SOLUTION INTRAMUSCULAR; INTRAVENOUS at 21:44

## 2018-10-04 RX ADMIN — IPRATROPIUM BROMIDE AND ALBUTEROL SULFATE 1 AMPULE: .5; 3 SOLUTION RESPIRATORY (INHALATION) at 21:44

## 2018-10-04 ASSESSMENT — PAIN SCALES - GENERAL: PAINLEVEL_OUTOF10: 8

## 2018-10-05 ENCOUNTER — TELEPHONE (OUTPATIENT)
Dept: CARDIOLOGY CLINIC | Age: 53
End: 2018-10-05

## 2018-10-05 PROCEDURE — 93010 ELECTROCARDIOGRAM REPORT: CPT | Performed by: INTERNAL MEDICINE

## 2018-10-05 NOTE — TELEPHONE ENCOUNTER
Spoke whit pt. Weights have been 219, 221, 223. Slight SOB. Feels very swollen. Took Metolazone and extra Lasix today as directed in OV. Please advise. Went to ER last night for SOB and breathing treatment.

## 2018-10-08 ENCOUNTER — TELEPHONE (OUTPATIENT)
Dept: CARDIOLOGY CLINIC | Age: 53
End: 2018-10-08

## 2018-10-08 ENCOUNTER — HOSPITAL ENCOUNTER (OUTPATIENT)
Age: 53
Discharge: HOME | End: 2018-10-08
Payer: COMMERCIAL

## 2018-10-08 DIAGNOSIS — I34.0 MITRAL VALVE INSUFFICIENCY, UNSPECIFIED ETIOLOGY: ICD-10-CM

## 2018-10-08 DIAGNOSIS — I25.10 CORONARY ARTERY DISEASE INVOLVING NATIVE CORONARY ARTERY OF NATIVE HEART WITHOUT ANGINA PECTORIS: ICD-10-CM

## 2018-10-08 DIAGNOSIS — Z79.899 MEDICATION MANAGEMENT: Primary | ICD-10-CM

## 2018-10-08 DIAGNOSIS — I25.5 ISCHEMIC CARDIOMYOPATHY: ICD-10-CM

## 2018-10-08 DIAGNOSIS — I50.22 CHRONIC SYSTOLIC CONGESTIVE HEART FAILURE (HCC): ICD-10-CM

## 2018-10-08 LAB
ANION GAP SERPL CALCULATED.3IONS-SCNC: 13 MMOL/L (ref 3–16)
BUN BLDV-MCNC: 34 MG/DL (ref 7–20)
CALCIUM SERPL-MCNC: 10.1 MG/DL (ref 8.3–10.6)
CHLORIDE BLD-SCNC: 83 MMOL/L (ref 99–110)
CO2: 36 MMOL/L (ref 21–32)
CREAT SERPL-MCNC: 1.7 MG/DL (ref 0.6–1.1)
GFR AFRICAN AMERICAN: 38
GFR NON-AFRICAN AMERICAN: 31
GLUCOSE BLD-MCNC: 428 MG/DL (ref 70–99)
POTASSIUM SERPL-SCNC: 3.3 MMOL/L (ref 3.5–5.1)
PRO-BNP: 2187 PG/ML (ref 0–124)
SODIUM BLD-SCNC: 132 MMOL/L (ref 136–145)

## 2018-10-08 PROCEDURE — 83880 ASSAY OF NATRIURETIC PEPTIDE: CPT

## 2018-10-08 PROCEDURE — 36415 COLL VENOUS BLD VENIPUNCTURE: CPT

## 2018-10-08 PROCEDURE — 80048 BASIC METABOLIC PNL TOTAL CA: CPT

## 2018-10-08 RX ORDER — METOLAZONE 2.5 MG/1
TABLET ORAL
Qty: 30 TABLET | Refills: 0 | Status: SHIPPED | OUTPATIENT
Start: 2018-10-08 | End: 2018-11-12 | Stop reason: SDUPTHER

## 2018-10-08 NOTE — TELEPHONE ENCOUNTER
----- Message from TRAY Cunha CNP sent at 10/8/2018 12:40 PM EDT -----  Now she looks over diuresed. Her blood glucose remains elevated - more so. Please find out what her weight is and how her symptoms of breathing and swelling are.    TRAY Cunha CNP

## 2018-10-08 NOTE — TELEPHONE ENCOUNTER
212 lbs, down 11 lbs since Friday. Decrease the furosemide (Lasix) to 40 mg bid and use the metolazone 2.5 mg every 3 rd day (take 30 minutes prior to AM dose of Lasix). Repeat BMP and BNP in 1 week.    Thank you, Ethel Raya

## 2018-10-08 NOTE — TELEPHONE ENCOUNTER
Spoke with Poppy,relayed message per [employer]. Pt verbalized understanding and pt would like rx refill of metolazone.

## 2018-10-09 LAB
EKG ATRIAL RATE: 83 BPM
EKG DIAGNOSIS: NORMAL
EKG P AXIS: 66 DEGREES
EKG P-R INTERVAL: 194 MS
EKG Q-T INTERVAL: 432 MS
EKG QRS DURATION: 140 MS
EKG QTC CALCULATION (BAZETT): 507 MS
EKG R AXIS: -77 DEGREES
EKG T AXIS: 57 DEGREES
EKG VENTRICULAR RATE: 83 BPM

## 2018-10-15 ENCOUNTER — TELEPHONE (OUTPATIENT)
Dept: CARDIOLOGY CLINIC | Age: 53
End: 2018-10-15

## 2018-10-15 ENCOUNTER — HOSPITAL ENCOUNTER (OUTPATIENT)
Age: 53
Discharge: HOME | End: 2018-10-15
Payer: COMMERCIAL

## 2018-10-15 DIAGNOSIS — Z79.899 MEDICATION MANAGEMENT: ICD-10-CM

## 2018-10-15 LAB
ANION GAP SERPL CALCULATED.3IONS-SCNC: 15 MMOL/L (ref 3–16)
B-TYPE NATRIURETIC PEPTIDE: 2275 PG/ML
BUN BLDV-MCNC: 33 MG/DL
BUN BLDV-MCNC: 33 MG/DL (ref 7–20)
CALCIUM SERPL-MCNC: 10 MG/DL
CALCIUM SERPL-MCNC: 10 MG/DL (ref 8.3–10.6)
CHLORIDE BLD-SCNC: 84 MMOL/L
CHLORIDE BLD-SCNC: 84 MMOL/L (ref 99–110)
CO2: 36 MMOL/L
CO2: 36 MMOL/L (ref 21–32)
CREAT SERPL-MCNC: 1.2 MG/DL
CREAT SERPL-MCNC: 1.2 MG/DL (ref 0.6–1.1)
GFR AFRICAN AMERICAN: 56
GFR CALCULATED: 47
GFR NON-AFRICAN AMERICAN: 47
GLUCOSE BLD-MCNC: 199 MG/DL
GLUCOSE BLD-MCNC: 199 MG/DL (ref 70–99)
POTASSIUM SERPL-SCNC: 2.9 MMOL/L
POTASSIUM SERPL-SCNC: 2.9 MMOL/L (ref 3.5–5.1)
PRO-BNP: 2275 PG/ML (ref 0–124)
SODIUM BLD-SCNC: 135 MMOL/L
SODIUM BLD-SCNC: 135 MMOL/L (ref 136–145)

## 2018-10-15 PROCEDURE — 80048 BASIC METABOLIC PNL TOTAL CA: CPT

## 2018-10-15 PROCEDURE — 83880 ASSAY OF NATRIURETIC PEPTIDE: CPT

## 2018-10-15 PROCEDURE — 36415 COLL VENOUS BLD VENIPUNCTURE: CPT

## 2018-10-16 ENCOUNTER — TELEPHONE (OUTPATIENT)
Dept: CARDIOLOGY CLINIC | Age: 53
End: 2018-10-16

## 2018-10-16 DIAGNOSIS — Z79.899 MEDICATION MANAGEMENT: Primary | ICD-10-CM

## 2018-10-16 NOTE — TELEPHONE ENCOUNTER
----- Message from TRAY Munoz CNP sent at 10/15/2018  4:57 PM EDT -----  Potassium critically low - ask her to take 2 additional potassium today to total 6 today. She should stay on 6 total potassium daily for next 5 days then go back to 4 daily. Increase the spironolactone to 50 mg once daily. Repeat labs in 1 week.    Thank you, Douglas Brown

## 2018-10-16 NOTE — TELEPHONE ENCOUNTER
Spoke with Nara Pacheco, relayed message per [employer]. Pt verbalized understanding. Placed lab orders in epic.

## 2018-10-18 RX ORDER — SPIRONOLACTONE 50 MG/1
50 TABLET, FILM COATED ORAL DAILY
Qty: 30 TABLET | Refills: 5 | Status: SHIPPED | OUTPATIENT
Start: 2018-10-18 | End: 2019-02-25 | Stop reason: SDUPTHER

## 2018-10-18 NOTE — TELEPHONE ENCOUNTER
Pt needs an Rx for the spironolactone 50 mg sent to [facility] [phone].

## 2018-10-22 ENCOUNTER — HOSPITAL ENCOUNTER (OUTPATIENT)
Age: 53
Discharge: HOME | End: 2018-10-22
Payer: COMMERCIAL

## 2018-10-22 ENCOUNTER — TELEPHONE (OUTPATIENT)
Dept: CARDIOLOGY CLINIC | Age: 53
End: 2018-10-22

## 2018-10-22 DIAGNOSIS — Z79.899 MEDICATION MANAGEMENT: Primary | ICD-10-CM

## 2018-10-22 LAB
ANION GAP SERPL CALCULATED.3IONS-SCNC: 11 MMOL/L (ref 3–16)
BUN BLDV-MCNC: 21 MG/DL (ref 7–20)
CALCIUM SERPL-MCNC: 9.7 MG/DL (ref 8.3–10.6)
CHLORIDE BLD-SCNC: 91 MMOL/L (ref 99–110)
CO2: 32 MMOL/L (ref 21–32)
CREAT SERPL-MCNC: 1.1 MG/DL (ref 0.6–1.1)
GFR AFRICAN AMERICAN: >60
GFR NON-AFRICAN AMERICAN: 52
GLUCOSE BLD-MCNC: 310 MG/DL (ref 70–99)
POTASSIUM SERPL-SCNC: 4.2 MMOL/L (ref 3.5–5.1)
PRO-BNP: 1940 PG/ML (ref 0–124)
SODIUM BLD-SCNC: 134 MMOL/L (ref 136–145)

## 2018-10-22 PROCEDURE — 80048 BASIC METABOLIC PNL TOTAL CA: CPT

## 2018-10-22 PROCEDURE — 36415 COLL VENOUS BLD VENIPUNCTURE: CPT

## 2018-10-22 PROCEDURE — 83880 ASSAY OF NATRIURETIC PEPTIDE: CPT

## 2018-10-22 NOTE — TELEPHONE ENCOUNTER
----- Message from TRAY Shaw CNP sent at 10/22/2018 12:46 PM EDT -----  Potassium much better. Kidney function also improved. BNP \"heart failure number\" better as well. Okay for her to decrease the potassium pills to 4 per day. Stay on the spironolactone 50 mg daily. Repeat BMP and BNP again in 1 week.    Thank you, Sharita Jessica

## 2018-10-30 ENCOUNTER — HOSPITAL ENCOUNTER (OUTPATIENT)
Age: 53
Discharge: HOME | End: 2018-10-30
Payer: COMMERCIAL

## 2018-10-30 ENCOUNTER — TELEPHONE (OUTPATIENT)
Dept: CARDIOLOGY CLINIC | Age: 53
End: 2018-10-30

## 2018-10-30 LAB
ANION GAP SERPL CALCULATED.3IONS-SCNC: 12 MMOL/L (ref 3–16)
BUN BLDV-MCNC: 21 MG/DL (ref 7–20)
CALCIUM SERPL-MCNC: 9.3 MG/DL (ref 8.3–10.6)
CHLORIDE BLD-SCNC: 91 MMOL/L (ref 99–110)
CO2: 30 MMOL/L (ref 21–32)
CREAT SERPL-MCNC: 1.1 MG/DL (ref 0.6–1.1)
GFR AFRICAN AMERICAN: >60
GFR NON-AFRICAN AMERICAN: 52
GLUCOSE BLD-MCNC: 393 MG/DL (ref 70–99)
POTASSIUM SERPL-SCNC: 4.1 MMOL/L (ref 3.5–5.1)
PRO-BNP: 1672 PG/ML (ref 0–124)
SODIUM BLD-SCNC: 133 MMOL/L (ref 136–145)

## 2018-10-30 PROCEDURE — 83880 ASSAY OF NATRIURETIC PEPTIDE: CPT

## 2018-10-30 PROCEDURE — 36415 COLL VENOUS BLD VENIPUNCTURE: CPT

## 2018-10-30 PROCEDURE — 80048 BASIC METABOLIC PNL TOTAL CA: CPT

## 2018-10-30 NOTE — TELEPHONE ENCOUNTER
----- Message from TRAY Ni CNP sent at 10/30/2018  3:22 PM EDT -----  Covering for [employer]. Heart failure number is improved. Potassium remains stable. No changes to medications. Keep follow up.

## 2018-11-05 ENCOUNTER — TELEPHONE (OUTPATIENT)
Dept: CARDIOLOGY CLINIC | Age: 53
End: 2018-11-05

## 2018-11-05 ENCOUNTER — HOSPITAL ENCOUNTER (OUTPATIENT)
Age: 53
Discharge: HOME | End: 2018-11-05
Payer: COMMERCIAL

## 2018-11-05 DIAGNOSIS — I34.0 MITRAL VALVE INSUFFICIENCY, UNSPECIFIED ETIOLOGY: ICD-10-CM

## 2018-11-05 DIAGNOSIS — Z79.899 MEDICATION MANAGEMENT: Primary | ICD-10-CM

## 2018-11-05 DIAGNOSIS — I25.5 ISCHEMIC CARDIOMYOPATHY: ICD-10-CM

## 2018-11-05 DIAGNOSIS — I25.10 CORONARY ARTERY DISEASE INVOLVING NATIVE CORONARY ARTERY OF NATIVE HEART WITHOUT ANGINA PECTORIS: ICD-10-CM

## 2018-11-05 DIAGNOSIS — I50.22 CHRONIC SYSTOLIC CONGESTIVE HEART FAILURE (HCC): ICD-10-CM

## 2018-11-05 LAB
ANION GAP SERPL CALCULATED.3IONS-SCNC: 10 MMOL/L (ref 3–16)
BUN BLDV-MCNC: 19 MG/DL (ref 7–20)
CALCIUM SERPL-MCNC: 8.8 MG/DL (ref 8.3–10.6)
CHLORIDE BLD-SCNC: 86 MMOL/L (ref 99–110)
CO2: 32 MMOL/L (ref 21–32)
CREAT SERPL-MCNC: 1.1 MG/DL (ref 0.6–1.1)
GFR AFRICAN AMERICAN: >60
GFR NON-AFRICAN AMERICAN: 52
GLUCOSE BLD-MCNC: 278 MG/DL (ref 70–99)
POTASSIUM SERPL-SCNC: 3.4 MMOL/L (ref 3.5–5.1)
PRO-BNP: 2217 PG/ML (ref 0–124)
SODIUM BLD-SCNC: 128 MMOL/L (ref 136–145)

## 2018-11-05 PROCEDURE — 36415 COLL VENOUS BLD VENIPUNCTURE: CPT

## 2018-11-05 PROCEDURE — 83880 ASSAY OF NATRIURETIC PEPTIDE: CPT

## 2018-11-05 PROCEDURE — 80048 BASIC METABOLIC PNL TOTAL CA: CPT

## 2018-11-05 NOTE — TELEPHONE ENCOUNTER
----- Message from TRAY Molina CNP sent at 11/5/2018 12:05 PM EST -----  BNP \"heart failure number\" is back up a bit. Her blood sodium level and potassium are lower. Please ask her to cut back on her fluid intake, no more than 48 oz per day. Repeat labs in 1 week.    Thanks, [employer]

## 2018-11-12 ENCOUNTER — HOSPITAL ENCOUNTER (OUTPATIENT)
Age: 53
Discharge: HOME | End: 2018-11-12
Payer: COMMERCIAL

## 2018-11-12 DIAGNOSIS — I34.0 MITRAL VALVE INSUFFICIENCY, UNSPECIFIED ETIOLOGY: ICD-10-CM

## 2018-11-12 DIAGNOSIS — I50.22 CHRONIC SYSTOLIC CONGESTIVE HEART FAILURE (HCC): ICD-10-CM

## 2018-11-12 DIAGNOSIS — I25.10 CORONARY ARTERY DISEASE INVOLVING NATIVE CORONARY ARTERY OF NATIVE HEART WITHOUT ANGINA PECTORIS: ICD-10-CM

## 2018-11-12 DIAGNOSIS — I25.5 ISCHEMIC CARDIOMYOPATHY: ICD-10-CM

## 2018-11-12 LAB
ANION GAP SERPL CALCULATED.3IONS-SCNC: 14 MMOL/L (ref 3–16)
BUN BLDV-MCNC: 22 MG/DL (ref 7–20)
CALCIUM SERPL-MCNC: 9.3 MG/DL (ref 8.3–10.6)
CHLORIDE BLD-SCNC: 92 MMOL/L (ref 99–110)
CO2: 27 MMOL/L (ref 21–32)
CREAT SERPL-MCNC: 1.3 MG/DL (ref 0.6–1.1)
GFR AFRICAN AMERICAN: 51
GFR NON-AFRICAN AMERICAN: 43
GLUCOSE BLD-MCNC: 400 MG/DL (ref 70–99)
POTASSIUM SERPL-SCNC: 4.2 MMOL/L (ref 3.5–5.1)
PRO-BNP: 1740 PG/ML (ref 0–124)
SODIUM BLD-SCNC: 133 MMOL/L (ref 136–145)

## 2018-11-12 PROCEDURE — 36415 COLL VENOUS BLD VENIPUNCTURE: CPT

## 2018-11-12 PROCEDURE — 83880 ASSAY OF NATRIURETIC PEPTIDE: CPT

## 2018-11-12 PROCEDURE — 80048 BASIC METABOLIC PNL TOTAL CA: CPT

## 2018-11-12 RX ORDER — METOLAZONE 2.5 MG/1
TABLET ORAL
Qty: 30 TABLET | Refills: 11 | Status: SHIPPED | OUTPATIENT
Start: 2018-11-12 | End: 2018-11-14 | Stop reason: SDUPTHER

## 2018-11-12 NOTE — TELEPHONE ENCOUNTER
Have take an additional dose of metolazone 2.5 mg, today and tomorrow.    Thanks, [employer]

## 2018-11-12 NOTE — TELEPHONE ENCOUNTER
I called and spoke with patient. I gave her instructions to take metolazone 2.5 mg, today and tomorrow. She needs this refilled.  Med pended

## 2018-11-14 ENCOUNTER — OFFICE VISIT (OUTPATIENT)
Dept: CARDIOLOGY CLINIC | Age: 53
End: 2018-11-14
Payer: COMMERCIAL

## 2018-11-14 ENCOUNTER — HOSPITAL ENCOUNTER (OUTPATIENT)
Dept: NURSING | Age: 53
Setting detail: INFUSION SERIES
Discharge: HOME | End: 2018-11-14
Payer: COMMERCIAL

## 2018-11-14 VITALS
SYSTOLIC BLOOD PRESSURE: 112 MMHG | HEIGHT: 64 IN | DIASTOLIC BLOOD PRESSURE: 66 MMHG | OXYGEN SATURATION: 95 % | HEART RATE: 94 BPM | WEIGHT: 226 LBS | BODY MASS INDEX: 38.58 KG/M2

## 2018-11-14 VITALS
SYSTOLIC BLOOD PRESSURE: 127 MMHG | HEIGHT: 64 IN | WEIGHT: 226 LBS | OXYGEN SATURATION: 96 % | RESPIRATION RATE: 16 BRPM | DIASTOLIC BLOOD PRESSURE: 66 MMHG | HEART RATE: 81 BPM | BODY MASS INDEX: 38.58 KG/M2

## 2018-11-14 DIAGNOSIS — I73.9 PVD (PERIPHERAL VASCULAR DISEASE) (HCC): ICD-10-CM

## 2018-11-14 DIAGNOSIS — I34.0 MITRAL VALVE INSUFFICIENCY, UNSPECIFIED ETIOLOGY: ICD-10-CM

## 2018-11-14 DIAGNOSIS — I50.22 CHRONIC SYSTOLIC CONGESTIVE HEART FAILURE (HCC): ICD-10-CM

## 2018-11-14 DIAGNOSIS — I25.5 ISCHEMIC CARDIOMYOPATHY: ICD-10-CM

## 2018-11-14 DIAGNOSIS — I25.10 CORONARY ARTERY DISEASE INVOLVING NATIVE CORONARY ARTERY OF NATIVE HEART WITHOUT ANGINA PECTORIS: Primary | ICD-10-CM

## 2018-11-14 DIAGNOSIS — E78.2 MIXED HYPERLIPIDEMIA: ICD-10-CM

## 2018-11-14 LAB
ANION GAP SERPL CALCULATED.3IONS-SCNC: 13 MMOL/L (ref 3–16)
BUN BLDV-MCNC: 22 MG/DL (ref 7–20)
CALCIUM SERPL-MCNC: 9.6 MG/DL (ref 8.3–10.6)
CHLORIDE BLD-SCNC: 91 MMOL/L (ref 99–110)
CO2: 32 MMOL/L (ref 21–32)
CREAT SERPL-MCNC: 1.1 MG/DL (ref 0.6–1.1)
GFR AFRICAN AMERICAN: >60
GFR NON-AFRICAN AMERICAN: 52
GLUCOSE BLD-MCNC: 207 MG/DL (ref 70–99)
MAGNESIUM: 1.8 MG/DL (ref 1.8–2.4)
POTASSIUM SERPL-SCNC: 3.1 MMOL/L (ref 3.5–5.1)
PRO-BNP: 1793 PG/ML (ref 0–124)
SODIUM BLD-SCNC: 136 MMOL/L (ref 136–145)

## 2018-11-14 PROCEDURE — G8484 FLU IMMUNIZE NO ADMIN: HCPCS | Performed by: NURSE PRACTITIONER

## 2018-11-14 PROCEDURE — 6360000002 HC RX W HCPCS: Performed by: INTERNAL MEDICINE

## 2018-11-14 PROCEDURE — 4004F PT TOBACCO SCREEN RCVD TLK: CPT | Performed by: NURSE PRACTITIONER

## 2018-11-14 PROCEDURE — G8427 DOCREV CUR MEDS BY ELIG CLIN: HCPCS | Performed by: NURSE PRACTITIONER

## 2018-11-14 PROCEDURE — G8417 CALC BMI ABV UP PARAM F/U: HCPCS | Performed by: NURSE PRACTITIONER

## 2018-11-14 PROCEDURE — 36415 COLL VENOUS BLD VENIPUNCTURE: CPT

## 2018-11-14 PROCEDURE — G8598 ASA/ANTIPLAT THER USED: HCPCS | Performed by: NURSE PRACTITIONER

## 2018-11-14 PROCEDURE — 83880 ASSAY OF NATRIURETIC PEPTIDE: CPT

## 2018-11-14 PROCEDURE — 80048 BASIC METABOLIC PNL TOTAL CA: CPT

## 2018-11-14 PROCEDURE — 83735 ASSAY OF MAGNESIUM: CPT

## 2018-11-14 PROCEDURE — 96374 THER/PROPH/DIAG INJ IV PUSH: CPT

## 2018-11-14 PROCEDURE — 99214 OFFICE O/P EST MOD 30 MIN: CPT | Performed by: NURSE PRACTITIONER

## 2018-11-14 PROCEDURE — 3017F COLORECTAL CA SCREEN DOC REV: CPT | Performed by: NURSE PRACTITIONER

## 2018-11-14 RX ORDER — FUROSEMIDE 10 MG/ML
80 INJECTION INTRAMUSCULAR; INTRAVENOUS ONCE
Status: DISCONTINUED | OUTPATIENT
Start: 2018-11-14 | End: 2018-11-14 | Stop reason: SDUPTHER

## 2018-11-14 RX ORDER — FUROSEMIDE 10 MG/ML
80 INJECTION INTRAMUSCULAR; INTRAVENOUS ONCE
Status: COMPLETED | OUTPATIENT
Start: 2018-11-14 | End: 2018-11-14

## 2018-11-14 RX ORDER — METOLAZONE 2.5 MG/1
TABLET ORAL
Qty: 30 TABLET | Refills: 11 | Status: SHIPPED | OUTPATIENT
Start: 2018-11-14 | End: 2018-11-26 | Stop reason: SDUPTHER

## 2018-11-14 RX ADMIN — FUROSEMIDE 80 MG: 10 INJECTION, SOLUTION INTRAMUSCULAR; INTRAVENOUS at 10:51

## 2018-11-14 NOTE — PROGRESS NOTES
gastrointestinal endoscopy (12/10/2015); Upper gastrointestinal endoscopy (01/06/2017); Arterial bypass surgry (Left, 01/06/2016); Cardiac catheterization (03/27/2018); Coronary angioplasty (01/03/2018); Coronary angioplasty with stent (03/2018); and Rotator cuff repair (Left, 04/22/2016). Social History:   reports that she has been smoking. She has a 30.00 pack-year smoking history. She has never used smokeless tobacco. She reports that she does not drink alcohol or use drugs. Family History:   Family History   Problem Relation Age of Onset    Heart Disease Maternal Grandmother     Cancer Mother         lung and brain cancer    Cancer Maternal Aunt         lung and brain cancer       Home Medications:  Prior to Admission medications    Medication Sig Start Date End Date Taking?  Authorizing Provider   metolazone (ZAROXOLYN) 2.5 MG tablet Take one tablet by mouth every 3rd day,  30 minutes prior to morning dose of Lasix 11/12/18  Yes TRAY Molina CNP   spironolactone (ALDACTONE) 50 MG tablet Take 1 tablet by mouth daily 10/18/18  Yes TRAY Molina CNP   potassium chloride (KLOR-CON M) 20 MEQ extended release tablet Take 2 tablets by mouth 2 times daily 10/3/18  Yes TRAY Molina CNP   furosemide (LASIX) 40 MG tablet Take 1 tablet by mouth 2 times daily 10/3/18  Yes TRAY aSnchez CNP   albuterol (PROVENTIL) (2.5 MG/3ML) 0.083% nebulizer solution Inhale 3 mLs into the lungs 7/5/18  Yes Historical Provider, MD   PROAIR  (20 Base) MCG/ACT inhaler  7/6/18  Yes Historical Provider, MD   carvedilol (COREG) 3.125 MG tablet Take 1 tablet by mouth 2 times daily 6/18/18  Yes TRAY Salgado CNP   atorvastatin (LIPITOR) 80 MG tablet Take 80 mg by mouth nightly 5/2/18  Yes Historical Provider, MD   busPIRone (BUSPAR) 30 MG tablet Take 30 mg by mouth 3 times daily 4/2/18  Yes Historical Provider, MD   metFORMIN (GLUCOPHAGE-XR) 500 MG extended release tablet Take 1,000 mg by mouth 2 times daily 4/2/18  Yes Historical Provider, MD   buprenorphine-naloxone (SUBOXONE) 8-2 MG SUBL SL tablet Place 2 tablets under the tongue daily. .   Yes Historical Provider, MD   doxepin (SINEQUAN) 50 MG capsule Take 50 mg by mouth nightly   Yes Historical Provider, MD   insulin glargine (LANTUS) 100 UNIT/ML injection vial Inject 25 Units into the skin 2 times daily   Yes Historical Provider, MD   nitroGLYCERIN (NITROSTAT) 0.4 MG SL tablet Place 0.4 mg under the tongue every 5 minutes as needed for Chest pain (times 3) up to max of 3 total doses. If no relief after 1 dose, call 911. Yes Historical Provider, MD   ticagrelor (BRILINTA) 90 MG TABS tablet Take 90 mg by mouth 2 times daily   Yes Historical Provider, MD   aspirin EC 81 MG EC tablet Take 1 tablet by mouth daily 1/8/16  Yes Dee Dee Ambrose MD   pantoprazole (PROTONIX) 40 MG tablet Take 1 tablet by mouth daily  Patient taking differently: Take 40 mg by mouth 2 times daily  12/11/15  Yes Rosena Nyhan, MD   ARIPiprazole (ABILIFY) 10 MG tablet Take 5 mg by mouth daily    Yes Historical Provider, MD   lamoTRIgine (LAMICTAL) 150 MG tablet Take 150 mg by mouth 2 times daily    Yes Historical Provider, MD        Allergies:  Lisinopril     Review of Systems:   · Constitutional: there has been no unanticipated weight loss. There's been no change in energy level, sleep pattern, or activity level. · Eyes: No visual changes or diplopia. No scleral icterus. · ENT: No Headaches, hearing loss or vertigo. No mouth sores or sore throat. · Cardiovascular: Reviewed in HPI  · Respiratory: No cough or wheezing, no sputum production. No hematemesis. · Gastrointestinal: No abdominal pain, appetite loss, blood in stools. No change in bowel or bladder habits. · Genitourinary: No dysuria, trouble voiding, or hematuria. · Musculoskeletal:  No gait disturbance, weakness or joint complaints. · Integumentary: No rash or pruritis.   · Neurological: No

## 2018-11-14 NOTE — PROGRESS NOTES
Pt here ambulatory  with family for a Lasix injection  Pt reports she has been very tired, weak and has gained some water weight this past week  Pt reports she is on Lasix at home and denies need for a print out or any information about the lasix  IV # 24 started on 1st attempt to left hand  Blood for lab work drawn  Pt rose mary well

## 2018-11-14 NOTE — PROGRESS NOTES
Lasiz 80 mg given IVP over 8 mins then INT flushed with 10 cc NS  IV removed  Cath tip intact  Pressure then pressure dressing applied and secured with a coban dressing  IV site unremarkable  Pt rose mary well  Pt noted to fall asleep when not disturbed but arouses easily to name  Pt reports that she didn't sleep very well last nite and is very tired now  Pt was up to BR  Gait steady  Discharge instructions reviewed with pt and copy was given  Understanding verbalized then pt was discharged ambulatory in stable condition with family

## 2018-11-15 ENCOUNTER — TELEPHONE (OUTPATIENT)
Dept: CARDIOLOGY CLINIC | Age: 53
End: 2018-11-15

## 2018-11-15 RX ORDER — MAGNESIUM OXIDE 400 MG/1
400 TABLET ORAL DAILY
Qty: 30 TABLET | Refills: 3 | Status: SHIPPED | OUTPATIENT
Start: 2018-11-15 | End: 2019-05-08 | Stop reason: SDUPTHER

## 2018-11-15 NOTE — TELEPHONE ENCOUNTER
----- Message from TRAY Ortiz CNP sent at 11/15/2018  7:44 AM EST -----  BNP \"heart failure number\" stable, kidney function stable. K and Mag are low. Since she received IV Lasix yesterday, she needs to take 6 potassium pills today then 4 daily while she takes the increased dose of metolazone. Let's also add MagOx 400 mg daily. Repeat labs on Monday next week. She is supposed to call with update on weight and how she is feeling tomorrow. Ask her today too.    Thanks, [employer]

## 2018-11-16 ENCOUNTER — TELEPHONE (OUTPATIENT)
Dept: CARDIOLOGY CLINIC | Age: 53
End: 2018-11-16

## 2018-11-16 DIAGNOSIS — I50.21 ACUTE SYSTOLIC CHF (CONGESTIVE HEART FAILURE), NYHA CLASS 4 (HCC): Primary | ICD-10-CM

## 2018-11-16 DIAGNOSIS — R00.2 PALPITATIONS: ICD-10-CM

## 2018-11-16 NOTE — TELEPHONE ENCOUNTER
Covering for npdd. Lab Results   Component Value Date     11/14/2018    K 3.1 11/14/2018    K 4.2 05/27/2018    CL 91 11/14/2018    CO2 32 11/14/2018    BUN 22 11/14/2018    CREATININE 1.1 11/14/2018    GLUCOSE 207 11/14/2018    CALCIUM 9.6 11/14/2018      S/p infusion of lasix. Weight is down to 217lbs from 228lbs per the records. I would recommend to continue current management. Recommend to hold the metolazone if her weight goes down to 210lbs. Please have her call [employer] with update on Monday.

## 2018-11-19 PROBLEM — R00.2 PALPITATIONS: Status: ACTIVE | Noted: 2018-11-19

## 2018-11-19 NOTE — TELEPHONE ENCOUNTER
I called the patient back. She was getting her EKG now. I reviewed the message with Reggie Pritchett CNP. I had miss read the message earlier. She is to hold her metolazone if her weight goes below 210 pounds. She also needs a BMP and Magnesium level drawn today or tomorrow. I Spoke with Theodore Beasley CNP at [facility] where the patient is currently. Her EKG today showed NSR and RBBB. Theodore Beasley said they could drawn labs there today. I will fax her EKG to her so she can compare to the EKG from today. I also asked Theodore Beasley to tell the patient the date and time of her appointment with Dr. Krystyna Joshua on December 19th at [address] fax number [phone].

## 2018-11-24 ENCOUNTER — HOSPITAL ENCOUNTER (OUTPATIENT)
Age: 53
Discharge: HOME | End: 2018-11-24
Payer: COMMERCIAL

## 2018-11-24 DIAGNOSIS — I50.22 CHRONIC SYSTOLIC CONGESTIVE HEART FAILURE (HCC): ICD-10-CM

## 2018-11-24 DIAGNOSIS — I25.5 ISCHEMIC CARDIOMYOPATHY: ICD-10-CM

## 2018-11-24 DIAGNOSIS — I50.21 ACUTE SYSTOLIC CHF (CONGESTIVE HEART FAILURE), NYHA CLASS 4 (HCC): ICD-10-CM

## 2018-11-24 DIAGNOSIS — I34.0 MITRAL VALVE INSUFFICIENCY, UNSPECIFIED ETIOLOGY: ICD-10-CM

## 2018-11-24 DIAGNOSIS — I25.10 CORONARY ARTERY DISEASE INVOLVING NATIVE CORONARY ARTERY OF NATIVE HEART WITHOUT ANGINA PECTORIS: ICD-10-CM

## 2018-11-24 DIAGNOSIS — R00.2 PALPITATIONS: ICD-10-CM

## 2018-11-24 LAB
ANION GAP SERPL CALCULATED.3IONS-SCNC: 15 MMOL/L (ref 3–16)
BUN BLDV-MCNC: 45 MG/DL (ref 7–20)
CALCIUM SERPL-MCNC: 10.1 MG/DL (ref 8.3–10.6)
CHLORIDE BLD-SCNC: 82 MMOL/L (ref 99–110)
CO2: 35 MMOL/L (ref 21–32)
CREAT SERPL-MCNC: 1.3 MG/DL (ref 0.6–1.1)
GFR AFRICAN AMERICAN: 51
GFR NON-AFRICAN AMERICAN: 43
GLUCOSE BLD-MCNC: 229 MG/DL (ref 70–99)
MAGNESIUM: 1.7 MG/DL (ref 1.8–2.4)
POTASSIUM SERPL-SCNC: 2.9 MMOL/L (ref 3.5–5.1)
PRO-BNP: 1208 PG/ML (ref 0–124)
SODIUM BLD-SCNC: 132 MMOL/L (ref 136–145)

## 2018-11-24 PROCEDURE — 83880 ASSAY OF NATRIURETIC PEPTIDE: CPT

## 2018-11-24 PROCEDURE — 83735 ASSAY OF MAGNESIUM: CPT

## 2018-11-24 PROCEDURE — 36415 COLL VENOUS BLD VENIPUNCTURE: CPT

## 2018-11-24 PROCEDURE — 80048 BASIC METABOLIC PNL TOTAL CA: CPT

## 2018-11-25 DIAGNOSIS — E87.6 HYPOKALEMIA: Primary | ICD-10-CM

## 2018-11-28 ENCOUNTER — HOSPITAL ENCOUNTER (OUTPATIENT)
Age: 53
Discharge: HOME | End: 2018-11-28
Payer: COMMERCIAL

## 2018-11-28 ENCOUNTER — OFFICE VISIT (OUTPATIENT)
Dept: CARDIOLOGY CLINIC | Age: 53
End: 2018-11-28
Payer: COMMERCIAL

## 2018-11-28 VITALS
OXYGEN SATURATION: 97 % | WEIGHT: 221 LBS | HEIGHT: 64 IN | DIASTOLIC BLOOD PRESSURE: 68 MMHG | HEART RATE: 80 BPM | BODY MASS INDEX: 37.73 KG/M2 | SYSTOLIC BLOOD PRESSURE: 122 MMHG

## 2018-11-28 DIAGNOSIS — I34.0 MITRAL VALVE INSUFFICIENCY, UNSPECIFIED ETIOLOGY: ICD-10-CM

## 2018-11-28 DIAGNOSIS — I25.10 CORONARY ARTERY DISEASE INVOLVING NATIVE CORONARY ARTERY OF NATIVE HEART WITHOUT ANGINA PECTORIS: ICD-10-CM

## 2018-11-28 DIAGNOSIS — I73.9 PVD (PERIPHERAL VASCULAR DISEASE) (HCC): ICD-10-CM

## 2018-11-28 DIAGNOSIS — E78.2 MIXED HYPERLIPIDEMIA: ICD-10-CM

## 2018-11-28 DIAGNOSIS — N18.30 TYPE 2 DIABETES MELLITUS WITH STAGE 3 CHRONIC KIDNEY DISEASE, WITH LONG-TERM CURRENT USE OF INSULIN (HCC): Chronic | ICD-10-CM

## 2018-11-28 DIAGNOSIS — Z79.4 TYPE 2 DIABETES MELLITUS WITH STAGE 3 CHRONIC KIDNEY DISEASE, WITH LONG-TERM CURRENT USE OF INSULIN (HCC): Chronic | ICD-10-CM

## 2018-11-28 DIAGNOSIS — I50.22 CHRONIC SYSTOLIC CONGESTIVE HEART FAILURE (HCC): Primary | ICD-10-CM

## 2018-11-28 DIAGNOSIS — I25.5 ISCHEMIC CARDIOMYOPATHY: ICD-10-CM

## 2018-11-28 DIAGNOSIS — E11.22 TYPE 2 DIABETES MELLITUS WITH STAGE 3 CHRONIC KIDNEY DISEASE, WITH LONG-TERM CURRENT USE OF INSULIN (HCC): Chronic | ICD-10-CM

## 2018-11-28 LAB
A/G RATIO: 0.9 (ref 1.1–2.2)
ALBUMIN SERPL-MCNC: 3.9 G/DL (ref 3.4–5)
ALP BLD-CCNC: 100 U/L (ref 40–129)
ALT SERPL-CCNC: 12 U/L (ref 10–40)
ANION GAP SERPL CALCULATED.3IONS-SCNC: 14 MMOL/L (ref 3–16)
AST SERPL-CCNC: 14 U/L (ref 15–37)
BILIRUB SERPL-MCNC: 0.3 MG/DL (ref 0–1)
BUN BLDV-MCNC: 32 MG/DL (ref 7–20)
CALCIUM SERPL-MCNC: 9.7 MG/DL (ref 8.3–10.6)
CHLORIDE BLD-SCNC: 88 MMOL/L (ref 99–110)
CHOLESTEROL, TOTAL: 152 MG/DL (ref 0–199)
CO2: 29 MMOL/L (ref 21–32)
CREAT SERPL-MCNC: 1.4 MG/DL (ref 0.6–1.1)
CREATININE URINE: 48.5 MG/DL (ref 28–259)
GFR AFRICAN AMERICAN: 47
GFR NON-AFRICAN AMERICAN: 39
GLOBULIN: 4.2 G/DL
GLUCOSE BLD-MCNC: 384 MG/DL (ref 70–99)
HDLC SERPL-MCNC: 25 MG/DL (ref 40–60)
LDL CHOLESTEROL CALCULATED: 76 MG/DL
MICROALBUMIN UR-MCNC: 1.6 MG/DL
MICROALBUMIN/CREAT UR-RTO: 33 MG/G (ref 0–30)
POTASSIUM SERPL-SCNC: 4.2 MMOL/L (ref 3.5–5.1)
PRO-BNP: 2411 PG/ML (ref 0–124)
SODIUM BLD-SCNC: 131 MMOL/L (ref 136–145)
TOTAL PROTEIN: 8.1 G/DL (ref 6.4–8.2)
TRIGL SERPL-MCNC: 255 MG/DL (ref 0–150)
VLDLC SERPL CALC-MCNC: 51 MG/DL

## 2018-11-28 PROCEDURE — 82570 ASSAY OF URINE CREATININE: CPT

## 2018-11-28 PROCEDURE — 80053 COMPREHEN METABOLIC PANEL: CPT

## 2018-11-28 PROCEDURE — 82043 UR ALBUMIN QUANTITATIVE: CPT

## 2018-11-28 PROCEDURE — 99214 OFFICE O/P EST MOD 30 MIN: CPT | Performed by: NURSE PRACTITIONER

## 2018-11-28 PROCEDURE — 4004F PT TOBACCO SCREEN RCVD TLK: CPT | Performed by: NURSE PRACTITIONER

## 2018-11-28 PROCEDURE — 2022F DILAT RTA XM EVC RTNOPTHY: CPT | Performed by: NURSE PRACTITIONER

## 2018-11-28 PROCEDURE — G8598 ASA/ANTIPLAT THER USED: HCPCS | Performed by: NURSE PRACTITIONER

## 2018-11-28 PROCEDURE — 3046F HEMOGLOBIN A1C LEVEL >9.0%: CPT | Performed by: NURSE PRACTITIONER

## 2018-11-28 PROCEDURE — 83880 ASSAY OF NATRIURETIC PEPTIDE: CPT

## 2018-11-28 PROCEDURE — 3017F COLORECTAL CA SCREEN DOC REV: CPT | Performed by: NURSE PRACTITIONER

## 2018-11-28 PROCEDURE — 36415 COLL VENOUS BLD VENIPUNCTURE: CPT

## 2018-11-28 PROCEDURE — G8417 CALC BMI ABV UP PARAM F/U: HCPCS | Performed by: NURSE PRACTITIONER

## 2018-11-28 PROCEDURE — G8427 DOCREV CUR MEDS BY ELIG CLIN: HCPCS | Performed by: NURSE PRACTITIONER

## 2018-11-28 PROCEDURE — 83036 HEMOGLOBIN GLYCOSYLATED A1C: CPT

## 2018-11-28 PROCEDURE — G8484 FLU IMMUNIZE NO ADMIN: HCPCS | Performed by: NURSE PRACTITIONER

## 2018-11-28 PROCEDURE — 80061 LIPID PANEL: CPT

## 2018-11-28 RX ORDER — FUROSEMIDE 40 MG/1
40 TABLET ORAL DAILY
Qty: 60 TABLET | Refills: 5
Start: 2018-11-28 | End: 2019-01-09 | Stop reason: ALTCHOICE

## 2018-11-28 RX ORDER — METOLAZONE 5 MG/1
5 TABLET ORAL DAILY
Qty: 30 TABLET | Refills: 1 | Status: SHIPPED | OUTPATIENT
Start: 2018-11-28 | End: 2019-01-28 | Stop reason: SDUPTHER

## 2018-11-28 RX ORDER — CARVEDILOL 3.12 MG/1
3.12 TABLET ORAL 2 TIMES DAILY WITH MEALS
Qty: 60 TABLET | Refills: 5 | Status: SHIPPED | OUTPATIENT
Start: 2018-11-28 | End: 2019-06-11 | Stop reason: SDUPTHER

## 2018-11-28 ASSESSMENT — ENCOUNTER SYMPTOMS
SHORTNESS OF BREATH: 1
COUGH: 1
WHEEZING: 1

## 2018-11-28 NOTE — PROGRESS NOTES
myalgias. Skin: Positive for pallor. Neurological: Positive for weakness. Psychiatric/Behavioral: Positive for sleep disturbance. Physical Examination:    Vitals:    11/28/18 1234   BP: 122/68   Pulse: 80   SpO2: 97%   Weight: 221 lb (100.2 kg)   Height: 5' 4\" (1.626 m)        Constitutional and General Appearance: Warm and dry, no apparent distress, normal coloration  HEENT:  Normocephalic, atraumatic  Respiratory:  · Normal excursion and expansion without use of accessory muscles  · Resp Auscultation: faint inspiratory wheeze right upper lobe  Cardiovascular:  · The apical impulses not displaced  · Heart tones are crisp and normal  · JVP 12 cm H2O  · Regular rate and rhythm, normal S1S2, no m/g/r  · Peripheral pulses are symmetrical and full  · There is no clubbing, cyanosis of the extremities.   · 1+ BLE edema  · Pedal Pulses: 2+ and equal   Abdomen:  · No masses or tenderness, + HJR  · Liver/Spleen: No Abnormalities Noted  Neurological/Psychiatric:  · Alert and oriented in all spheres  · Moves all extremities well  · Exhibits normal gait balance and coordination  · No abnormalities of mood, affect, memory, mentation, or behavior are noted    Lab Data:    CBC:   Lab Results   Component Value Date    WBC 9.4 10/04/2018    WBC 8.7 06/12/2018    WBC 6.4 05/27/2018    RBC 4.60 10/04/2018    RBC 3.68 06/12/2018    RBC 3.77 05/27/2018    HGB 13.1 10/04/2018    HGB 10.3 06/12/2018    HGB 10.5 05/27/2018    HCT 39.6 10/04/2018    HCT 32.2 06/12/2018    HCT 32.0 05/27/2018    MCV 86.1 10/04/2018    MCV 88 06/12/2018    MCV 85.0 05/27/2018    RDW 16.4 10/04/2018    RDW 15.4 06/12/2018    RDW 15.4 05/27/2018     10/04/2018     06/12/2018     05/27/2018     BMP:  Lab Results   Component Value Date     11/28/2018     11/24/2018     11/14/2018    K 4.2 11/28/2018    K 2.9 11/24/2018    K 3.1 11/14/2018    K 4.2 05/27/2018    K 4.2 05/24/2018    CL 88 11/28/2018    CL 82 11/24/2018    CL 91 11/14/2018    CO2 29 11/28/2018    CO2 35 11/24/2018    CO2 32 11/14/2018    PHOS 3.1 05/25/2018    PHOS 3.0 05/23/2018    PHOS 3.3 05/23/2018    BUN 32 11/28/2018    BUN 45 11/24/2018    BUN 22 11/14/2018    CREATININE 1.4 11/28/2018    CREATININE 1.3 11/24/2018    CREATININE 1.1 11/14/2018     BNP:   Lab Results   Component Value Date    PROBNP 2,411 11/28/2018    PROBNP 1,208 11/24/2018    PROBNP 1,793 11/14/2018        Cardiac Imaging:  Arterial doppler studies 8/2/18:   Ugo Hasten is no arterial insufficiency at rest involving the lower extremities    bilaterally, however, there is evidence to suggest calf vessel disease    bilaterally. [facility] 3/26/18 [employer] promus premier [id number] CCx and [id number] CCx. Echo: 5/23/18:    Ejection fraction is visually estimated to be 30-35 %. There is akinesis of the apex and inferior walls. Left ventricular size is mildly increased. Moderate mitral regurgitation. Moderate amount plaque is noted in the aorta. The left atrium is mildly dilated. 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 A bubble study was performed and fails to show evidence of shunting. Procedure performed: Transesophageal echocardiogram 5/25/18:    Findings:  Reduced left ventricular function with ejection fraction estimated at about 35% with apical and inferior akinesis    The cardiac valves show moderate mitral regurgitation  There is no evidence for an intracardiac shunt or intracardiac thrombus. Cardiac cath 5/25/18:   Procedure Performed:  1. Coronary angiogram  2. Left heart cath  3. Left ventriculogram  4. Right heart cath   Findings:  1. Patent LAD and CIRC stents              ~mild CAD  2. Reduced LVEF 30% with anterior and apical akinesis  3. Moderate pulmonary hyperension   Conclusion/plan of care:  1. Medical management    Assessment:    1. Chronic systolic congestive heart failure ([address]    2.  Coronary artery disease involving native

## 2018-11-29 LAB
ESTIMATED AVERAGE GLUCOSE: 289.1 MG/DL
HBA1C MFR BLD: 11.7 %

## 2018-11-30 PROBLEM — I50.22 CHRONIC SYSTOLIC CONGESTIVE HEART FAILURE (HCC): Status: ACTIVE | Noted: 2018-11-30

## 2018-12-04 ENCOUNTER — HOSPITAL ENCOUNTER (OUTPATIENT)
Age: 53
Discharge: HOME | End: 2018-12-04
Payer: COMMERCIAL

## 2018-12-04 ENCOUNTER — TELEPHONE (OUTPATIENT)
Dept: CARDIOLOGY CLINIC | Age: 53
End: 2018-12-04

## 2018-12-04 ENCOUNTER — OFFICE VISIT (OUTPATIENT)
Dept: PULMONOLOGY | Age: 53
End: 2018-12-04
Payer: COMMERCIAL

## 2018-12-04 VITALS
BODY MASS INDEX: 36.65 KG/M2 | HEIGHT: 65 IN | WEIGHT: 220 LBS | OXYGEN SATURATION: 98 % | DIASTOLIC BLOOD PRESSURE: 78 MMHG | SYSTOLIC BLOOD PRESSURE: 128 MMHG | HEART RATE: 81 BPM | TEMPERATURE: 97.9 F | RESPIRATION RATE: 20 BRPM

## 2018-12-04 DIAGNOSIS — I25.10 CORONARY ARTERY DISEASE INVOLVING NATIVE CORONARY ARTERY OF NATIVE HEART WITHOUT ANGINA PECTORIS: ICD-10-CM

## 2018-12-04 DIAGNOSIS — R05.9 COUGH: ICD-10-CM

## 2018-12-04 DIAGNOSIS — G47.30 OBSERVED SLEEP APNEA: Primary | ICD-10-CM

## 2018-12-04 DIAGNOSIS — R06.02 SOB (SHORTNESS OF BREATH): ICD-10-CM

## 2018-12-04 DIAGNOSIS — I25.5 ISCHEMIC CARDIOMYOPATHY: ICD-10-CM

## 2018-12-04 DIAGNOSIS — I50.22 CHRONIC SYSTOLIC CONGESTIVE HEART FAILURE (HCC): ICD-10-CM

## 2018-12-04 DIAGNOSIS — G47.10 HYPERSOMNIA: ICD-10-CM

## 2018-12-04 DIAGNOSIS — R06.83 SNORING: ICD-10-CM

## 2018-12-04 DIAGNOSIS — Z87.891 PERSONAL HISTORY OF SMOKING: ICD-10-CM

## 2018-12-04 DIAGNOSIS — I34.0 MITRAL VALVE INSUFFICIENCY, UNSPECIFIED ETIOLOGY: ICD-10-CM

## 2018-12-04 DIAGNOSIS — R53.83 FATIGUE, UNSPECIFIED TYPE: ICD-10-CM

## 2018-12-04 LAB
ANION GAP SERPL CALCULATED.3IONS-SCNC: 17 MMOL/L (ref 3–16)
BUN BLDV-MCNC: 29 MG/DL (ref 7–20)
CALCIUM SERPL-MCNC: 9.7 MG/DL (ref 8.3–10.6)
CHLORIDE BLD-SCNC: 84 MMOL/L (ref 99–110)
CO2: 32 MMOL/L (ref 21–32)
CREAT SERPL-MCNC: 1.6 MG/DL (ref 0.6–1.1)
GFR AFRICAN AMERICAN: 40
GFR NON-AFRICAN AMERICAN: 33
GLUCOSE BLD-MCNC: 353 MG/DL (ref 70–99)
POTASSIUM SERPL-SCNC: 3.7 MMOL/L (ref 3.5–5.1)
PRO-BNP: 1975 PG/ML (ref 0–124)
SODIUM BLD-SCNC: 133 MMOL/L (ref 136–145)

## 2018-12-04 PROCEDURE — 80048 BASIC METABOLIC PNL TOTAL CA: CPT

## 2018-12-04 PROCEDURE — G8598 ASA/ANTIPLAT THER USED: HCPCS | Performed by: INTERNAL MEDICINE

## 2018-12-04 PROCEDURE — G8484 FLU IMMUNIZE NO ADMIN: HCPCS | Performed by: INTERNAL MEDICINE

## 2018-12-04 PROCEDURE — 36415 COLL VENOUS BLD VENIPUNCTURE: CPT

## 2018-12-04 PROCEDURE — 99215 OFFICE O/P EST HI 40 MIN: CPT | Performed by: INTERNAL MEDICINE

## 2018-12-04 PROCEDURE — 3017F COLORECTAL CA SCREEN DOC REV: CPT | Performed by: INTERNAL MEDICINE

## 2018-12-04 PROCEDURE — G8427 DOCREV CUR MEDS BY ELIG CLIN: HCPCS | Performed by: INTERNAL MEDICINE

## 2018-12-04 PROCEDURE — 83880 ASSAY OF NATRIURETIC PEPTIDE: CPT

## 2018-12-04 PROCEDURE — 4004F PT TOBACCO SCREEN RCVD TLK: CPT | Performed by: INTERNAL MEDICINE

## 2018-12-04 PROCEDURE — G8417 CALC BMI ABV UP PARAM F/U: HCPCS | Performed by: INTERNAL MEDICINE

## 2018-12-04 ASSESSMENT — SLEEP AND FATIGUE QUESTIONNAIRES
HOW LIKELY ARE YOU TO NOD OFF OR FALL ASLEEP WHILE LYING DOWN TO REST IN THE AFTERNOON WHEN CIRCUMSTANCES PERMIT: 3
HOW LIKELY ARE YOU TO NOD OFF OR FALL ASLEEP WHILE SITTING AND READING: 3
HOW LIKELY ARE YOU TO NOD OFF OR FALL ASLEEP IN A CAR, WHILE STOPPED FOR A FEW MINUTES IN TRAFFIC: 2
HOW LIKELY ARE YOU TO NOD OFF OR FALL ASLEEP WHILE SITTING AND TALKING TO SOMEONE: 2
HOW LIKELY ARE YOU TO NOD OFF OR FALL ASLEEP WHEN YOU ARE A PASSENGER IN A CAR FOR AN HOUR WITHOUT A BREAK: 3
HOW LIKELY ARE YOU TO NOD OFF OR FALL ASLEEP WHILE WATCHING TV: 3
HOW LIKELY ARE YOU TO NOD OFF OR FALL ASLEEP WHILE SITTING INACTIVE IN A PUBLIC PLACE: 3
HOW LIKELY ARE YOU TO NOD OFF OR FALL ASLEEP WHILE SITTING QUIETLY AFTER LUNCH WITHOUT ALCOHOL: 3
NECK CIRCUMFERENCE (INCHES): 15.75
ESS TOTAL SCORE: 22

## 2018-12-04 NOTE — TELEPHONE ENCOUNTER
Created telephone encounter. [facility] requesting a call back to the office. Will relay lab results per [employer] once pt calls back.

## 2018-12-04 NOTE — PROGRESS NOTES
[facility]                                                                  CHIEF COMPLAINT: Sleep apnea evaluation    Consulting provider:  Enid FELIZ     HPI:   Snoring at night for the past 20 years. The severity of snoring is severe. Sleeps on her chair because of that. Snoring is interrupting sleep of others. Worse in supine position and better on the side. Has observed sleep apnea. Wakes up at night choking and gasping for air. Patient is complaining of daytime sleepiness. Fatigue and tiredness during the day. Bedtime 11 pm and rise time is 5:30 am. Sleep onset few minutes. 3-4 nocturia. Wakes up 4-5 times at night. It takes him 10 minutes to fall back a sleep. 1 nap/day for 30 min. No car wrecks or near wrecks because of the sleepiness. No nodding off while driving. Drinks 3 caffinated beverages per day. No significant alcohol. ESS is 22. Smoker for 45 years, 1 ppd. SOB, cough and wheezes. Cough with some brown sputum. No hemoptysis. Dyspnea worse with exertion and better with resting. Has been on IBD/Neb for 6 months.      Past Medical History:   Diagnosis Date    Anxiety and depression     Diabetes mellitus (HCC)     GERD (gastroesophageal reflux disease)     Hyperlipidemia     Hypertension     Peripheral neuropathy     Peripheral vascular disease (HCC)     Reflux     Sleep apnea     has never done sleep study;does not use CPAP    Wears glasses     for reading       Past Surgical History:        Procedure Laterality Date    ARTERIAL BYPASS SURGRY Left 01/06/2016    Axillary/Subclavian to Brachial Bypass w/reversed SVG    CARDIAC CATHETERIZATION  03/27/2018    Dr. Fer Chacko - at [address]      pancreatitis post surgery   [address]   01/03/2018    CORONARY ANGIOPLASTY WITH STENT PLACEMENT  03/2018    LAD stent, PCI to [address] [place] [address] Trachea midline. No obvious mass. Neck circumference 15.75 \"  Resp: No accessory muscle use. No crackles. Bilateral wheezes. No rhonchi. No dullness on percussion. Decreased air entry. CV: Regular rate. Regular rhythm. No murmur or rub. 2+ edema. GI: Non-tender. Non-distended. No hernia. Skin: Warm and dry. No nodule on exposed extremities. Lymph: No cervical LAD. No supraclavicular LAD. M/S: No cyanosis. No joint deformity. No clubbing. Neuro: Awake. Alert. Moves all four extremities. Psych: Oriented x 3. No anxiety. DATA reviewed by me:   CXR 10/4/2018  images reviewed by me and showed: vascular congestion     Assessment:       · Snoring  · Observed sleep apnea   · Hypersomnia   · Fatigue   · SOB, cough and wheezes - probable underlying COPD  · CAD, ischemic cardiomyopathy, mitral valve insufficiency, and chronic systolic heart failure (EF 30-35%)  · 45 pack year smoking       Plan:       · PSG evaluate for sleep related breathing disorder. Treatment options were discussed with patient if PSG reveals CLINT, including CPAP therapy, oral appliances, upper airway surgery and hypoglossal nerve stimulation. · Discussed with patient the pathophysiology of apnea. · Sleep hygiene  · Avoid sedatives, alcohol and caffeinated drinks at bed time. · No driving motorized vehicles or operating heavy machinery while fatigue, drowsy or sleepy. · Weight loss is also recommended as a long-term intervention. · Complications of CLINT if not treated were discussed with patient patient to include systemic hypertension, pulmonary hypertension, cardiovascular morbidities, car accidents and all cause mortality  · PFTs and 6MW   · Albuterol 2 puffs Q4-6 hrs PRN  · Spiriva Respimat: Two inhalations (5 mcg) once daily (maximum: 2 inhalations per 24 hours)  · Smoking cessation counseling. Patient was advised to visit smokefree. gov and call [email or web address] for assistance.  Will refer to [facility] smoking cessation program.

## 2018-12-07 ENCOUNTER — HOSPITAL ENCOUNTER (OUTPATIENT)
Dept: SLEEP CENTER | Age: 53
Discharge: HOME | End: 2018-12-09
Payer: COMMERCIAL

## 2018-12-07 DIAGNOSIS — R06.83 SNORING: ICD-10-CM

## 2018-12-07 DIAGNOSIS — R53.83 FATIGUE, UNSPECIFIED TYPE: ICD-10-CM

## 2018-12-07 DIAGNOSIS — G47.30 OBSERVED SLEEP APNEA: ICD-10-CM

## 2018-12-07 DIAGNOSIS — G47.10 HYPERSOMNIA: ICD-10-CM

## 2018-12-07 PROCEDURE — 95810 POLYSOM 6/> YRS 4/> PARAM: CPT

## 2018-12-10 ENCOUNTER — TELEPHONE (OUTPATIENT)
Dept: PULMONOLOGY | Age: 53
End: 2018-12-10

## 2018-12-10 DIAGNOSIS — G47.33 OSA (OBSTRUCTIVE SLEEP APNEA): Primary | ICD-10-CM

## 2018-12-11 ENCOUNTER — HOSPITAL ENCOUNTER (OUTPATIENT)
Dept: PULMONOLOGY | Age: 53
Discharge: HOME | End: 2018-12-11
Payer: COMMERCIAL

## 2018-12-11 DIAGNOSIS — R06.02 SOB (SHORTNESS OF BREATH): ICD-10-CM

## 2018-12-11 DIAGNOSIS — R05.9 COUGH: ICD-10-CM

## 2018-12-11 PROCEDURE — 94664 DEMO&/EVAL PT USE INHALER: CPT

## 2018-12-11 PROCEDURE — 94618 PULMONARY STRESS TESTING: CPT

## 2018-12-11 PROCEDURE — 94726 PLETHYSMOGRAPHY LUNG VOLUMES: CPT

## 2018-12-11 PROCEDURE — 94060 EVALUATION OF WHEEZING: CPT

## 2018-12-11 PROCEDURE — 94640 AIRWAY INHALATION TREATMENT: CPT

## 2018-12-11 PROCEDURE — 94729 DIFFUSING CAPACITY: CPT

## 2018-12-11 RX ORDER — ALBUTEROL SULFATE 2.5 MG/3ML
2.5 SOLUTION RESPIRATORY (INHALATION) ONCE
Status: DISCONTINUED | OUTPATIENT
Start: 2018-12-11 | End: 2018-12-12 | Stop reason: HOSPADM

## 2018-12-11 NOTE — PROCEDURES
[address]                 [address] [address]                               PULMONARY FUNCTION    PATIENT NAME: Meet BADILLO                    :        1965  MED REC NO:   [id number]                          ROOM:  ACCOUNT NO:   [de-identified]                           ADMIT DATE: 2018  PROVIDER:     Luiza Mehta MD    DATE OF PROCEDURE:  2018    ORDERING PROVIDER:  Manav Alexander MD    GIVEN DIAGNOSIS:  Obstructive sleep apnea. FINDINGS:  SPIROMETRY:  The FEV1 is 1.18 liters or 43% of predicted. The FVC is  1.63 liters or 47% of predicted. The FEV1/FVC ratio is 72%. There is  no demonstrative response to inhaled bronchodilators. PLETHYSMOGRAPHY:  The total lung capacity is 4.23 liters or 79% of  predicted. The expiratory reserve volume is 0.35, which is 40% of  predicted. DIFFUSION CAPACITY:  The diffusion capacity of carbon monoxide is 14.23  mL/min/mmHg, which is 59% of predicted. FLOW VOLUME LOOPS:  The tracings show a restrictive defect pattern. IMPRESSION:  1. There is no evidence of obstructive lung defect or response to  inhaled bronchodilators. 2.  There is a mild restrictive ventilatory defect, which is likely  secondary to the patient.s increased body mass index given the low  expiratory reserve volume. 3.  There is a mild reduction in diffusion capacity. SIX-MINUTE WALK TEST:  The patient performed a six-minute walk test  according to standard [facility] protocol. At baseline,  the patient's oxygen saturation was 98% with a heart rate of 84. The  patient was able to walk for 3 minutes before stopping due to hip and  leg pain. She walked a total distance of 440 feet. The lowest oxygen  saturation throughout the study was 96%, heart rate increased to maximum  of 97.     IMPRESSION:  The patient was unable to complete the six-minute walk  test, only walking for 3 minutes and 440

## 2018-12-12 ENCOUNTER — HOSPITAL ENCOUNTER (OUTPATIENT)
Age: 53
Discharge: HOME | End: 2018-12-12
Payer: COMMERCIAL

## 2018-12-12 ENCOUNTER — TELEPHONE (OUTPATIENT)
Dept: CARDIOLOGY CLINIC | Age: 53
End: 2018-12-12

## 2018-12-12 LAB
ANION GAP SERPL CALCULATED.3IONS-SCNC: 12 MMOL/L (ref 3–16)
BUN BLDV-MCNC: 36 MG/DL (ref 7–20)
CALCIUM SERPL-MCNC: 9.8 MG/DL (ref 8.3–10.6)
CHLORIDE BLD-SCNC: 90 MMOL/L (ref 99–110)
CO2: 32 MMOL/L (ref 21–32)
CREAT SERPL-MCNC: 1.2 MG/DL (ref 0.6–1.1)
GFR AFRICAN AMERICAN: 56
GFR NON-AFRICAN AMERICAN: 47
GLUCOSE BLD-MCNC: 378 MG/DL (ref 70–99)
POTASSIUM SERPL-SCNC: 3.7 MMOL/L (ref 3.5–5.1)
PRO-BNP: 2036 PG/ML (ref 0–124)
SODIUM BLD-SCNC: 134 MMOL/L (ref 136–145)

## 2018-12-12 PROCEDURE — 80048 BASIC METABOLIC PNL TOTAL CA: CPT

## 2018-12-12 PROCEDURE — 83880 ASSAY OF NATRIURETIC PEPTIDE: CPT

## 2018-12-12 PROCEDURE — 36415 COLL VENOUS BLD VENIPUNCTURE: CPT

## 2018-12-12 NOTE — TELEPHONE ENCOUNTER
----- Message from TRAY Bates CNP sent at 12/12/2018 11:19 AM EST -----  BNP \"heart failure number\" about the same/ stable. Kidney function panel stable to improved. Continue current regimen.    TRAY Bates CNP

## 2018-12-13 ENCOUNTER — TELEPHONE (OUTPATIENT)
Dept: CARDIOLOGY CLINIC | Age: 53
End: 2018-12-13

## 2018-12-17 ENCOUNTER — HOSPITAL ENCOUNTER (OUTPATIENT)
Dept: SLEEP CENTER | Age: 53
Discharge: HOME | End: 2018-12-19
Payer: COMMERCIAL

## 2018-12-17 DIAGNOSIS — G47.33 OSA (OBSTRUCTIVE SLEEP APNEA): ICD-10-CM

## 2018-12-17 PROCEDURE — 95811 POLYSOM 6/>YRS CPAP 4/> PARM: CPT

## 2018-12-18 NOTE — PROGRESS NOTES
(PROVENTIL) (2.5 MG/3ML) 0.083% nebulizer solution Inhale 3 mLs into the lungs      PROAIR  (90 Base) MCG/ACT inhaler       atorvastatin (LIPITOR) 80 MG tablet Take 80 mg by mouth nightly      busPIRone (BUSPAR) 30 MG tablet Take 30 mg by mouth 3 times daily      metFORMIN (GLUCOPHAGE-XR) 500 MG extended release tablet Take 1,000 mg by mouth 2 times daily      buprenorphine-naloxone (SUBOXONE) 8-2 MG SUBL SL tablet Place 2 tablets under the tongue daily. Kassy Presto doxepin (SINEQUAN) 50 MG capsule Take 50 mg by mouth nightly      insulin glargine (LANTUS) 100 UNIT/ML injection vial Inject 25 Units into the skin 2 times daily      nitroGLYCERIN (NITROSTAT) 0.4 MG SL tablet Place 0.4 mg under the tongue every 5 minutes as needed for Chest pain (times 3) up to max of 3 total doses. If no relief after 1 dose, call 911.  aspirin EC 81 MG EC tablet Take 1 tablet by mouth daily 30 tablet 3    pantoprazole (PROTONIX) 40 MG tablet Take 1 tablet by mouth daily (Patient taking differently: Take 40 mg by mouth 2 times daily ) 30 tablet 0    ARIPiprazole (ABILIFY) 10 MG tablet Take 5 mg by mouth daily       lamoTRIgine (LAMICTAL) 150 MG tablet Take 150 mg by mouth 2 times daily        No current facility-administered medications for this visit. Allergies:  Lisinopril     Review of Systems:   A 14 point review of symptoms completed. Pertinent positives identified in the HPI, all other review of symptoms negative as below.     Objective:   PHYSICAL EXAM:    Vitals:    12/19/18 0920   BP: 90/70   Pulse: 90   SpO2: 99%    Weight: 220 lb (99.8 kg)     Wt Readings from Last 3 Encounters:   12/19/18 220 lb (99.8 kg)   12/04/18 220 lb (99.8 kg)   11/28/18 221 lb (100.2 kg)         General Appearance:  Alert, cooperative, no distress, appears stated age   Head:  Normocephalic, atraumatic   Eyes:  PERRL, conjunctiva/corneas clear   Nose: Nares normal, no drainage or sinus tenderness   Throat: Lips, mucosa, and DATA   EKG:  10/4/2018 NSR with LAD< RBBB, possible anterior infarct    ECHO/MUGA:  URI: 05/25/18   Ejection fraction is visually estimated to be 30-35 %. There is akinesis of the apex and inferior walls. Left ventricular size is mildly increased. Moderate mitral regurgitation. Moderate amount plaque is noted in the aorta. The left atrium is mildly dilated. 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 A bubble study was performed and fails to show evidence of shunting. STRESS TEST:    CARDIAC CATH: 05/05/18  ANGIOGRAPHY FINDINGS:  1.  Mild coronary artery disease with previously placed stents within the  proximal and mid LAD and proximal and mid circumflex that are patent  without any evidence of significant in-stent restenosis. 2.  Severely reduced left ventricular function, EF of less than 35% with  anterior and apical hypokinesis. 3.  Moderate pulmonary hypertension. VASCULAR/OTHER IMAGING:    BLE doppler: 08/02/18  Impressions Right Impression 1. The right ankle/brachial index is 1.13. 2. There are no focal elevated velocities seen involving the lower extremity. 3. The common femoral, deep femoral, superficial femoral and popliteal arteries demonstrated multiphasic flow. The posterior tibial and anterior tibial arteries demonstrated abnormal monophasic flow. 4. There is minimal to moderate plaque seen involving the common femoral, distal superficial femoral and popliteal arteries. There is a stent seen involving the proximal to mid/distal superficial femoral artery. Left Impression 1. The left ankle/brachial index is 1.13. 2. There are no focal elevated velocities seen involving the lower extremity. 3. The common femoral, deep femoral, superficial femoral and popliteal arteries demonstrated multiphasic flow. The posterior tibial and anterior tibial arteries demonstrated abnormal monophasic flow.  4. There is minimal to moderate plaque seen involving the common femoral, superficial femoral and popliteal arteries     Summary      There is no arterial insufficiency at rest involving the lower extremities  bilaterally, however, there is evidence to suggest calf vessel disease  bilaterally. Assessment and Plan   Johnson Viramontes is a 52 y.o. female who presents today for the following problems:      47yo female, patient of Dr. Reynolds Gitelman and Estella Hanna    1. CAD   - s/p PCi to LAD  2. Chronic systolic CHF:  Ischemic cardiomyopathy: LVEF 30%  Mitral   3. PAD:    - SFA disease   - left subclavian bypass  4. Pulmonary Hypertension    MD Plan:  1. Pt today here for CardioMems evaluation at request of Estella Hanna. Pt herself with 1 inpatient stay for CHF complicated by cardiogenic shock on Dopamine, Reported at that NYHA class IV. 2. Today pt is NYHA class III, she has been difficult to control fluid status requiring weekly labs draws and lasix infusions in past as well. She is already on DAPT for CAD and would be compliant with medicine and transmissions. I d/w pt data from [facility] trial and also R,B,Aes of device implant procedure and she wishes to proceed. I will go ahead and submit pt for PA for RHC, pulmonary angiogram, and Cardiomems. Once PA approved, can determine cath timing.         Patient Active Problem List   Diagnosis    DM2/IDDM    HTN (hypertension)    CLINT (obstructive sleep apnea)    Tobacco smoker    PVD (peripheral vascular disease) with claudication (HCC)    Hypotension    Volume depletion    GERD (gastroesophageal reflux disease)    Hyperlipidemia    Anxiety and depression    Presyncope    Hypochloremia    Normocytic anemia    Hypomagnesemia    Acute hyperglycemia    Coronary artery disease involving native coronary artery of native heart without angina pectoris    PVD (peripheral vascular disease) (HCC)    Acute systolic CHF (congestive heart failure), NYHA class 4 (HCC)    Mitral regurgitation    Myocardiopathy (HCC)    Palpitations    Chronic systolic

## 2018-12-19 ENCOUNTER — OFFICE VISIT (OUTPATIENT)
Dept: CARDIOLOGY CLINIC | Age: 53
End: 2018-12-19
Payer: COMMERCIAL

## 2018-12-19 VITALS
OXYGEN SATURATION: 99 % | SYSTOLIC BLOOD PRESSURE: 90 MMHG | DIASTOLIC BLOOD PRESSURE: 70 MMHG | HEIGHT: 65 IN | HEART RATE: 90 BPM | WEIGHT: 220 LBS | BODY MASS INDEX: 36.65 KG/M2

## 2018-12-19 DIAGNOSIS — Z72.0 TOBACCO ABUSE: Chronic | ICD-10-CM

## 2018-12-19 DIAGNOSIS — I10 ESSENTIAL HYPERTENSION: Chronic | ICD-10-CM

## 2018-12-19 DIAGNOSIS — I25.10 CORONARY ARTERY DISEASE INVOLVING NATIVE CORONARY ARTERY OF NATIVE HEART WITHOUT ANGINA PECTORIS: ICD-10-CM

## 2018-12-19 DIAGNOSIS — E78.2 MIXED HYPERLIPIDEMIA: Chronic | ICD-10-CM

## 2018-12-19 DIAGNOSIS — I34.0 MITRAL VALVE INSUFFICIENCY, UNSPECIFIED ETIOLOGY: Chronic | ICD-10-CM

## 2018-12-19 DIAGNOSIS — I50.22 CHRONIC SYSTOLIC CONGESTIVE HEART FAILURE (HCC): Primary | ICD-10-CM

## 2018-12-19 DIAGNOSIS — I25.5 ISCHEMIC CARDIOMYOPATHY: ICD-10-CM

## 2018-12-19 PROCEDURE — G8417 CALC BMI ABV UP PARAM F/U: HCPCS | Performed by: INTERNAL MEDICINE

## 2018-12-19 PROCEDURE — 4004F PT TOBACCO SCREEN RCVD TLK: CPT | Performed by: INTERNAL MEDICINE

## 2018-12-19 PROCEDURE — 99214 OFFICE O/P EST MOD 30 MIN: CPT | Performed by: INTERNAL MEDICINE

## 2018-12-19 PROCEDURE — G8484 FLU IMMUNIZE NO ADMIN: HCPCS | Performed by: INTERNAL MEDICINE

## 2018-12-19 PROCEDURE — G8598 ASA/ANTIPLAT THER USED: HCPCS | Performed by: INTERNAL MEDICINE

## 2018-12-19 PROCEDURE — G8427 DOCREV CUR MEDS BY ELIG CLIN: HCPCS | Performed by: INTERNAL MEDICINE

## 2018-12-19 PROCEDURE — 3017F COLORECTAL CA SCREEN DOC REV: CPT | Performed by: INTERNAL MEDICINE

## 2018-12-20 ENCOUNTER — HOSPITAL ENCOUNTER (OUTPATIENT)
Age: 53
Discharge: HOME | End: 2018-12-20
Payer: COMMERCIAL

## 2018-12-20 DIAGNOSIS — I25.10 CORONARY ARTERY DISEASE INVOLVING NATIVE CORONARY ARTERY OF NATIVE HEART WITHOUT ANGINA PECTORIS: ICD-10-CM

## 2018-12-20 DIAGNOSIS — I25.5 ISCHEMIC CARDIOMYOPATHY: ICD-10-CM

## 2018-12-20 DIAGNOSIS — I34.0 MITRAL VALVE INSUFFICIENCY, UNSPECIFIED ETIOLOGY: ICD-10-CM

## 2018-12-20 DIAGNOSIS — G47.33 OSA (OBSTRUCTIVE SLEEP APNEA): Primary | ICD-10-CM

## 2018-12-20 DIAGNOSIS — I50.22 CHRONIC SYSTOLIC CONGESTIVE HEART FAILURE (HCC): ICD-10-CM

## 2018-12-20 LAB
ANION GAP SERPL CALCULATED.3IONS-SCNC: 14 MMOL/L (ref 3–16)
BUN BLDV-MCNC: 28 MG/DL (ref 7–20)
CALCIUM SERPL-MCNC: 9.8 MG/DL (ref 8.3–10.6)
CHLORIDE BLD-SCNC: 88 MMOL/L (ref 99–110)
CO2: 30 MMOL/L (ref 21–32)
CREAT SERPL-MCNC: 1.5 MG/DL (ref 0.6–1.1)
GFR AFRICAN AMERICAN: 44
GFR NON-AFRICAN AMERICAN: 36
GLUCOSE BLD-MCNC: 308 MG/DL (ref 70–99)
POTASSIUM SERPL-SCNC: 3.5 MMOL/L (ref 3.5–5.1)
PRO-BNP: 1785 PG/ML (ref 0–124)
SODIUM BLD-SCNC: 132 MMOL/L (ref 136–145)

## 2018-12-20 PROCEDURE — 83880 ASSAY OF NATRIURETIC PEPTIDE: CPT

## 2018-12-20 PROCEDURE — 36415 COLL VENOUS BLD VENIPUNCTURE: CPT

## 2018-12-20 PROCEDURE — 80048 BASIC METABOLIC PNL TOTAL CA: CPT

## 2018-12-27 RX ORDER — CARVEDILOL 3.12 MG/1
TABLET ORAL
Qty: 60 TABLET | Refills: 5 | Status: SHIPPED | OUTPATIENT
Start: 2018-12-27 | End: 2019-01-09 | Stop reason: SDUPTHER

## 2019-01-09 ENCOUNTER — HOSPITAL ENCOUNTER (OUTPATIENT)
Age: 54
Discharge: HOME | End: 2019-01-09
Payer: COMMERCIAL

## 2019-01-09 ENCOUNTER — OFFICE VISIT (OUTPATIENT)
Dept: CARDIOLOGY CLINIC | Age: 54
End: 2019-01-09
Payer: COMMERCIAL

## 2019-01-09 VITALS
SYSTOLIC BLOOD PRESSURE: 104 MMHG | WEIGHT: 221 LBS | HEIGHT: 64 IN | HEART RATE: 85 BPM | BODY MASS INDEX: 37.73 KG/M2 | OXYGEN SATURATION: 95 % | DIASTOLIC BLOOD PRESSURE: 62 MMHG

## 2019-01-09 DIAGNOSIS — E11.22 TYPE 2 DIABETES MELLITUS WITH STAGE 3 CHRONIC KIDNEY DISEASE, WITH LONG-TERM CURRENT USE OF INSULIN (HCC): ICD-10-CM

## 2019-01-09 DIAGNOSIS — E78.2 MIXED HYPERLIPIDEMIA: ICD-10-CM

## 2019-01-09 DIAGNOSIS — I10 ESSENTIAL HYPERTENSION: ICD-10-CM

## 2019-01-09 DIAGNOSIS — I50.22 CHRONIC SYSTOLIC CONGESTIVE HEART FAILURE (HCC): Primary | ICD-10-CM

## 2019-01-09 DIAGNOSIS — I25.10 CORONARY ARTERY DISEASE INVOLVING NATIVE CORONARY ARTERY OF NATIVE HEART WITHOUT ANGINA PECTORIS: ICD-10-CM

## 2019-01-09 DIAGNOSIS — I73.9 PVD (PERIPHERAL VASCULAR DISEASE) (HCC): ICD-10-CM

## 2019-01-09 DIAGNOSIS — I34.0 MITRAL VALVE INSUFFICIENCY, UNSPECIFIED ETIOLOGY: ICD-10-CM

## 2019-01-09 DIAGNOSIS — N18.30 CKD (CHRONIC KIDNEY DISEASE) STAGE 3, GFR 30-59 ML/MIN (HCC): ICD-10-CM

## 2019-01-09 DIAGNOSIS — N18.30 TYPE 2 DIABETES MELLITUS WITH STAGE 3 CHRONIC KIDNEY DISEASE, WITH LONG-TERM CURRENT USE OF INSULIN (HCC): ICD-10-CM

## 2019-01-09 DIAGNOSIS — Z79.4 TYPE 2 DIABETES MELLITUS WITH STAGE 3 CHRONIC KIDNEY DISEASE, WITH LONG-TERM CURRENT USE OF INSULIN (HCC): ICD-10-CM

## 2019-01-09 DIAGNOSIS — I25.5 ISCHEMIC CARDIOMYOPATHY: ICD-10-CM

## 2019-01-09 DIAGNOSIS — Z72.0 TOBACCO ABUSE: ICD-10-CM

## 2019-01-09 LAB
ANION GAP SERPL CALCULATED.3IONS-SCNC: 15 MMOL/L (ref 3–16)
BUN BLDV-MCNC: 29 MG/DL (ref 7–20)
CALCIUM SERPL-MCNC: 9.5 MG/DL (ref 8.3–10.6)
CHLORIDE BLD-SCNC: 85 MMOL/L (ref 99–110)
CO2: 34 MMOL/L (ref 21–32)
CREAT SERPL-MCNC: 1.2 MG/DL (ref 0.6–1.1)
GFR AFRICAN AMERICAN: 56
GFR NON-AFRICAN AMERICAN: 47
GLUCOSE BLD-MCNC: 345 MG/DL (ref 70–99)
POTASSIUM SERPL-SCNC: 3.9 MMOL/L (ref 3.5–5.1)
PRO-BNP: 1304 PG/ML (ref 0–124)
SODIUM BLD-SCNC: 134 MMOL/L (ref 136–145)

## 2019-01-09 PROCEDURE — 83880 ASSAY OF NATRIURETIC PEPTIDE: CPT

## 2019-01-09 PROCEDURE — G8599 NO ASA/ANTIPLAT THER USE RNG: HCPCS | Performed by: NURSE PRACTITIONER

## 2019-01-09 PROCEDURE — 3017F COLORECTAL CA SCREEN DOC REV: CPT | Performed by: NURSE PRACTITIONER

## 2019-01-09 PROCEDURE — 2022F DILAT RTA XM EVC RTNOPTHY: CPT | Performed by: NURSE PRACTITIONER

## 2019-01-09 PROCEDURE — G8484 FLU IMMUNIZE NO ADMIN: HCPCS | Performed by: NURSE PRACTITIONER

## 2019-01-09 PROCEDURE — 99214 OFFICE O/P EST MOD 30 MIN: CPT | Performed by: NURSE PRACTITIONER

## 2019-01-09 PROCEDURE — G8417 CALC BMI ABV UP PARAM F/U: HCPCS | Performed by: NURSE PRACTITIONER

## 2019-01-09 PROCEDURE — 80048 BASIC METABOLIC PNL TOTAL CA: CPT

## 2019-01-09 PROCEDURE — 3046F HEMOGLOBIN A1C LEVEL >9.0%: CPT | Performed by: NURSE PRACTITIONER

## 2019-01-09 PROCEDURE — 1036F TOBACCO NON-USER: CPT | Performed by: NURSE PRACTITIONER

## 2019-01-09 PROCEDURE — G8427 DOCREV CUR MEDS BY ELIG CLIN: HCPCS | Performed by: NURSE PRACTITIONER

## 2019-01-09 PROCEDURE — 36415 COLL VENOUS BLD VENIPUNCTURE: CPT

## 2019-01-09 RX ORDER — TORSEMIDE 100 MG/1
50 TABLET ORAL DAILY
Qty: 30 TABLET | Refills: 3 | Status: SHIPPED | OUTPATIENT
Start: 2019-01-09 | End: 2019-05-02 | Stop reason: DRUGHIGH

## 2019-01-09 ASSESSMENT — ENCOUNTER SYMPTOMS
COUGH: 1
CONSTIPATION: 1
NAUSEA: 0
ABDOMINAL DISTENTION: 1
APNEA: 1
VOMITING: 0
SHORTNESS OF BREATH: 1
WHEEZING: 1

## 2019-01-10 ENCOUNTER — TELEPHONE (OUTPATIENT)
Dept: CARDIOLOGY CLINIC | Age: 54
End: 2019-01-10

## 2019-01-24 ENCOUNTER — TELEPHONE (OUTPATIENT)
Dept: CARDIOLOGY CLINIC | Age: 54
End: 2019-01-24

## 2019-01-28 RX ORDER — METOLAZONE 5 MG/1
TABLET ORAL
Qty: 30 TABLET | Refills: 1 | Status: SHIPPED | OUTPATIENT
Start: 2019-01-28 | End: 2019-04-01 | Stop reason: SDUPTHER

## 2019-01-31 RX ORDER — CLOPIDOGREL BISULFATE 75 MG/1
75 TABLET ORAL DAILY
Qty: 30 TABLET | Refills: 5 | Status: SHIPPED | OUTPATIENT
Start: 2019-01-31 | End: 2019-08-31 | Stop reason: SDUPTHER

## 2019-02-06 ENCOUNTER — OFFICE VISIT (OUTPATIENT)
Dept: CARDIOLOGY CLINIC | Age: 54
End: 2019-02-06
Payer: COMMERCIAL

## 2019-02-06 ENCOUNTER — HOSPITAL ENCOUNTER (OUTPATIENT)
Age: 54
Discharge: HOME | End: 2019-02-06
Payer: COMMERCIAL

## 2019-02-06 VITALS
HEIGHT: 64 IN | HEART RATE: 86 BPM | SYSTOLIC BLOOD PRESSURE: 108 MMHG | WEIGHT: 221 LBS | DIASTOLIC BLOOD PRESSURE: 62 MMHG | BODY MASS INDEX: 37.73 KG/M2 | OXYGEN SATURATION: 95 %

## 2019-02-06 DIAGNOSIS — E78.2 MIXED HYPERLIPIDEMIA: ICD-10-CM

## 2019-02-06 DIAGNOSIS — I50.22 CHRONIC SYSTOLIC CONGESTIVE HEART FAILURE (HCC): ICD-10-CM

## 2019-02-06 DIAGNOSIS — G47.33 OSA (OBSTRUCTIVE SLEEP APNEA): Chronic | ICD-10-CM

## 2019-02-06 DIAGNOSIS — I34.0 MITRAL VALVE INSUFFICIENCY, UNSPECIFIED ETIOLOGY: ICD-10-CM

## 2019-02-06 DIAGNOSIS — I10 ESSENTIAL HYPERTENSION: ICD-10-CM

## 2019-02-06 DIAGNOSIS — I25.5 ISCHEMIC CARDIOMYOPATHY: ICD-10-CM

## 2019-02-06 DIAGNOSIS — Z72.0 TOBACCO ABUSE: ICD-10-CM

## 2019-02-06 DIAGNOSIS — I50.22 CHRONIC SYSTOLIC CONGESTIVE HEART FAILURE (HCC): Primary | ICD-10-CM

## 2019-02-06 DIAGNOSIS — I25.10 CORONARY ARTERY DISEASE INVOLVING NATIVE CORONARY ARTERY OF NATIVE HEART WITHOUT ANGINA PECTORIS: ICD-10-CM

## 2019-02-06 DIAGNOSIS — N18.30 CKD (CHRONIC KIDNEY DISEASE) STAGE 3, GFR 30-59 ML/MIN (HCC): ICD-10-CM

## 2019-02-06 LAB
ANION GAP SERPL CALCULATED.3IONS-SCNC: 14 MMOL/L (ref 3–16)
BUN BLDV-MCNC: 32 MG/DL (ref 7–20)
CALCIUM SERPL-MCNC: 9.6 MG/DL (ref 8.3–10.6)
CHLORIDE BLD-SCNC: 89 MMOL/L (ref 99–110)
CO2: 33 MMOL/L (ref 21–32)
CREAT SERPL-MCNC: 1 MG/DL (ref 0.6–1.1)
GFR AFRICAN AMERICAN: >60
GFR NON-AFRICAN AMERICAN: 58
GLUCOSE BLD-MCNC: 281 MG/DL (ref 70–99)
POTASSIUM SERPL-SCNC: 2.8 MMOL/L (ref 3.5–5.1)
PRO-BNP: 1401 PG/ML (ref 0–124)
SODIUM BLD-SCNC: 136 MMOL/L (ref 136–145)

## 2019-02-06 PROCEDURE — 36415 COLL VENOUS BLD VENIPUNCTURE: CPT

## 2019-02-06 PROCEDURE — 80048 BASIC METABOLIC PNL TOTAL CA: CPT

## 2019-02-06 PROCEDURE — G8484 FLU IMMUNIZE NO ADMIN: HCPCS | Performed by: NURSE PRACTITIONER

## 2019-02-06 PROCEDURE — G8598 ASA/ANTIPLAT THER USED: HCPCS | Performed by: NURSE PRACTITIONER

## 2019-02-06 PROCEDURE — G8427 DOCREV CUR MEDS BY ELIG CLIN: HCPCS | Performed by: NURSE PRACTITIONER

## 2019-02-06 PROCEDURE — 1036F TOBACCO NON-USER: CPT | Performed by: NURSE PRACTITIONER

## 2019-02-06 PROCEDURE — 3017F COLORECTAL CA SCREEN DOC REV: CPT | Performed by: NURSE PRACTITIONER

## 2019-02-06 PROCEDURE — 83880 ASSAY OF NATRIURETIC PEPTIDE: CPT

## 2019-02-06 PROCEDURE — 99214 OFFICE O/P EST MOD 30 MIN: CPT | Performed by: NURSE PRACTITIONER

## 2019-02-06 PROCEDURE — G8417 CALC BMI ABV UP PARAM F/U: HCPCS | Performed by: NURSE PRACTITIONER

## 2019-02-06 RX ORDER — POTASSIUM CHLORIDE 20 MEQ/1
40 TABLET, EXTENDED RELEASE ORAL 3 TIMES DAILY
Qty: 180 TABLET | Refills: 3 | Status: SHIPPED | OUTPATIENT
Start: 2019-02-06 | End: 2019-06-28 | Stop reason: DRUGHIGH

## 2019-02-06 ASSESSMENT — ENCOUNTER SYMPTOMS
DIARRHEA: 0
ABDOMINAL DISTENTION: 1
SHORTNESS OF BREATH: 1
CONSTIPATION: 0
WHEEZING: 0
COUGH: 0

## 2019-02-08 ENCOUNTER — HOSPITAL ENCOUNTER (OUTPATIENT)
Dept: CT IMAGING | Age: 54
Discharge: HOME | End: 2019-02-08
Payer: COMMERCIAL

## 2019-02-08 DIAGNOSIS — R91.1 PULMONARY NODULE: ICD-10-CM

## 2019-02-08 PROCEDURE — 71250 CT THORAX DX C-: CPT

## 2019-02-14 ENCOUNTER — HOSPITAL ENCOUNTER (OUTPATIENT)
Dept: CARDIAC CATH/INVASIVE PROCEDURES | Age: 54
Discharge: HOME | End: 2019-02-14
Attending: INTERNAL MEDICINE | Admitting: INTERNAL MEDICINE
Payer: COMMERCIAL

## 2019-02-14 VITALS — BODY MASS INDEX: 37.73 KG/M2 | WEIGHT: 221 LBS | HEIGHT: 64 IN

## 2019-02-14 LAB
ANION GAP SERPL CALCULATED.3IONS-SCNC: 14 MMOL/L (ref 3–16)
BUN BLDV-MCNC: 31 MG/DL (ref 7–20)
CALCIUM SERPL-MCNC: 9.1 MG/DL (ref 8.3–10.6)
CHLORIDE BLD-SCNC: 86 MMOL/L (ref 99–110)
CO2: 36 MMOL/L (ref 21–32)
CREAT SERPL-MCNC: 1.5 MG/DL (ref 0.6–1.1)
EKG ATRIAL RATE: 95 BPM
EKG DIAGNOSIS: NORMAL
EKG P AXIS: 73 DEGREES
EKG P-R INTERVAL: 198 MS
EKG Q-T INTERVAL: 402 MS
EKG QRS DURATION: 144 MS
EKG QTC CALCULATION (BAZETT): 505 MS
EKG R AXIS: -68 DEGREES
EKG T AXIS: 63 DEGREES
EKG VENTRICULAR RATE: 95 BPM
GFR AFRICAN AMERICAN: 44
GFR NON-AFRICAN AMERICAN: 36
GLUCOSE BLD-MCNC: 307 MG/DL (ref 70–99)
HCT VFR BLD CALC: 38 % (ref 36–48)
HEMOGLOBIN: 12.9 G/DL (ref 12–16)
INR BLD: 0.99 (ref 0.86–1.14)
MCH RBC QN AUTO: 29.3 PG (ref 26–34)
MCHC RBC AUTO-ENTMCNC: 33.9 G/DL (ref 31–36)
MCV RBC AUTO: 86.5 FL (ref 80–100)
PDW BLD-RTO: 14.8 % (ref 12.4–15.4)
PLATELET # BLD: 298 K/UL (ref 135–450)
PMV BLD AUTO: 8.4 FL (ref 5–10.5)
POC ACT LR: 172 SEC
POTASSIUM SERPL-SCNC: 3.8 MMOL/L (ref 3.5–5.1)
PROTHROMBIN TIME: 11.3 SEC (ref 9.8–13)
RBC # BLD: 4.4 M/UL (ref 4–5.2)
SODIUM BLD-SCNC: 136 MMOL/L (ref 136–145)
WBC # BLD: 10.5 K/UL (ref 4–11)

## 2019-02-14 PROCEDURE — 85610 PROTHROMBIN TIME: CPT

## 2019-02-14 PROCEDURE — 93005 ELECTROCARDIOGRAM TRACING: CPT

## 2019-02-14 PROCEDURE — 2580000003 HC RX 258

## 2019-02-14 PROCEDURE — 2709999900 HC NON-CHARGEABLE SUPPLY

## 2019-02-14 PROCEDURE — 99153 MOD SED SAME PHYS/QHP EA: CPT | Performed by: INTERNAL MEDICINE

## 2019-02-14 PROCEDURE — C1887 CATHETER, GUIDING: HCPCS

## 2019-02-14 PROCEDURE — 6360000004 HC RX CONTRAST MEDICATION: Performed by: INTERNAL MEDICINE

## 2019-02-14 PROCEDURE — 33289 TCAT IMPL WRLS P-ART PRS SNR: CPT | Performed by: INTERNAL MEDICINE

## 2019-02-14 PROCEDURE — 99152 MOD SED SAME PHYS/QHP 5/>YRS: CPT | Performed by: INTERNAL MEDICINE

## 2019-02-14 PROCEDURE — 85027 COMPLETE CBC AUTOMATED: CPT

## 2019-02-14 PROCEDURE — 2500000003 HC RX 250 WO HCPCS

## 2019-02-14 PROCEDURE — C1769 GUIDE WIRE: HCPCS

## 2019-02-14 PROCEDURE — 80048 BASIC METABOLIC PNL TOTAL CA: CPT

## 2019-02-14 PROCEDURE — 6360000002 HC RX W HCPCS

## 2019-02-14 PROCEDURE — 85347 COAGULATION TIME ACTIVATED: CPT

## 2019-02-14 PROCEDURE — C1894 INTRO/SHEATH, NON-LASER: HCPCS

## 2019-02-14 RX ORDER — CLOPIDOGREL 300 MG/1
300 TABLET, FILM COATED ORAL ONCE
Status: COMPLETED | OUTPATIENT
Start: 2019-02-14 | End: 2019-02-14

## 2019-02-14 RX ORDER — SODIUM CHLORIDE 9 MG/ML
1000 INJECTION, SOLUTION INTRAVENOUS CONTINUOUS
Status: DISCONTINUED | OUTPATIENT
Start: 2019-02-14 | End: 2019-02-14 | Stop reason: HOSPADM

## 2019-02-14 RX ADMIN — CLOPIDOGREL 300 MG: 300 TABLET, FILM COATED ORAL at 10:50

## 2019-02-14 RX ADMIN — IOVERSOL 15 ML: 741 INJECTION INTRA-ARTERIAL; INTRAVENOUS at 10:29

## 2019-02-15 DIAGNOSIS — I50.21 ACUTE SYSTOLIC CHF (CONGESTIVE HEART FAILURE), NYHA CLASS 4 (HCC): Primary | ICD-10-CM

## 2019-02-19 ENCOUNTER — TELEPHONE (OUTPATIENT)
Dept: CARDIOLOGY CLINIC | Age: 54
End: 2019-02-19

## 2019-02-19 ENCOUNTER — HOSPITAL ENCOUNTER (OUTPATIENT)
Age: 54
Discharge: HOME | End: 2019-02-19
Payer: COMMERCIAL

## 2019-02-19 DIAGNOSIS — I25.10 CORONARY ARTERY DISEASE INVOLVING NATIVE CORONARY ARTERY OF NATIVE HEART WITHOUT ANGINA PECTORIS: ICD-10-CM

## 2019-02-19 DIAGNOSIS — I50.22 CHRONIC SYSTOLIC CONGESTIVE HEART FAILURE (HCC): ICD-10-CM

## 2019-02-19 DIAGNOSIS — I50.21 ACUTE SYSTOLIC CHF (CONGESTIVE HEART FAILURE), NYHA CLASS 4 (HCC): ICD-10-CM

## 2019-02-19 DIAGNOSIS — I34.0 MITRAL VALVE INSUFFICIENCY, UNSPECIFIED ETIOLOGY: ICD-10-CM

## 2019-02-19 DIAGNOSIS — I25.5 ISCHEMIC CARDIOMYOPATHY: ICD-10-CM

## 2019-02-19 LAB
ALBUMIN SERPL-MCNC: 4.2 G/DL (ref 3.4–5)
ANION GAP SERPL CALCULATED.3IONS-SCNC: 14 MMOL/L (ref 3–16)
ANION GAP SERPL CALCULATED.3IONS-SCNC: 17 MMOL/L (ref 3–16)
BUN BLDV-MCNC: 31 MG/DL (ref 7–20)
BUN BLDV-MCNC: 31 MG/DL (ref 7–20)
CALCIUM SERPL-MCNC: 10.2 MG/DL (ref 8.3–10.6)
CALCIUM SERPL-MCNC: 10.2 MG/DL (ref 8.3–10.6)
CHLORIDE BLD-SCNC: 80 MMOL/L (ref 99–110)
CHLORIDE BLD-SCNC: 81 MMOL/L (ref 99–110)
CO2: 36 MMOL/L (ref 21–32)
CO2: 36 MMOL/L (ref 21–32)
CREAT SERPL-MCNC: 1.2 MG/DL (ref 0.6–1.1)
CREAT SERPL-MCNC: 1.2 MG/DL (ref 0.6–1.1)
GFR AFRICAN AMERICAN: 56
GFR AFRICAN AMERICAN: 56
GFR NON-AFRICAN AMERICAN: 47
GFR NON-AFRICAN AMERICAN: 47
GLUCOSE BLD-MCNC: 537 MG/DL (ref 70–99)
GLUCOSE BLD-MCNC: 544 MG/DL (ref 70–99)
PHOSPHORUS: 3.9 MG/DL (ref 2.5–4.9)
POTASSIUM SERPL-SCNC: 3.1 MMOL/L (ref 3.5–5.1)
POTASSIUM SERPL-SCNC: 3.1 MMOL/L (ref 3.5–5.1)
PRO-BNP: 533 PG/ML (ref 0–124)
PRO-BNP: 541 PG/ML (ref 0–124)
SODIUM BLD-SCNC: 131 MMOL/L (ref 136–145)
SODIUM BLD-SCNC: 133 MMOL/L (ref 136–145)

## 2019-02-19 PROCEDURE — 83880 ASSAY OF NATRIURETIC PEPTIDE: CPT

## 2019-02-19 PROCEDURE — 80069 RENAL FUNCTION PANEL: CPT

## 2019-02-19 PROCEDURE — 36415 COLL VENOUS BLD VENIPUNCTURE: CPT

## 2019-02-20 DIAGNOSIS — Z79.899 MEDICATION MANAGEMENT: Primary | ICD-10-CM

## 2019-02-22 ENCOUNTER — HOSPITAL ENCOUNTER (OUTPATIENT)
Age: 54
Discharge: HOME | End: 2019-02-22
Payer: COMMERCIAL

## 2019-02-22 DIAGNOSIS — I25.10 CORONARY ARTERY DISEASE INVOLVING NATIVE CORONARY ARTERY OF NATIVE HEART WITHOUT ANGINA PECTORIS: ICD-10-CM

## 2019-02-22 DIAGNOSIS — I34.0 MITRAL VALVE INSUFFICIENCY, UNSPECIFIED ETIOLOGY: ICD-10-CM

## 2019-02-22 DIAGNOSIS — I25.5 ISCHEMIC CARDIOMYOPATHY: ICD-10-CM

## 2019-02-22 DIAGNOSIS — I50.22 CHRONIC SYSTOLIC CONGESTIVE HEART FAILURE (HCC): ICD-10-CM

## 2019-02-22 LAB
ANION GAP SERPL CALCULATED.3IONS-SCNC: 15 MMOL/L (ref 3–16)
BUN BLDV-MCNC: 35 MG/DL (ref 7–20)
CALCIUM SERPL-MCNC: 9.5 MG/DL (ref 8.3–10.6)
CHLORIDE BLD-SCNC: 84 MMOL/L (ref 99–110)
CO2: 33 MMOL/L (ref 21–32)
CREAT SERPL-MCNC: 1.4 MG/DL (ref 0.6–1.1)
GFR AFRICAN AMERICAN: 47
GFR NON-AFRICAN AMERICAN: 39
GLUCOSE BLD-MCNC: 503 MG/DL (ref 70–99)
POTASSIUM SERPL-SCNC: 3.2 MMOL/L (ref 3.5–5.1)
PRO-BNP: 902 PG/ML (ref 0–124)
SODIUM BLD-SCNC: 132 MMOL/L (ref 136–145)

## 2019-02-22 PROCEDURE — 80048 BASIC METABOLIC PNL TOTAL CA: CPT

## 2019-02-22 PROCEDURE — 83880 ASSAY OF NATRIURETIC PEPTIDE: CPT

## 2019-02-22 PROCEDURE — 36415 COLL VENOUS BLD VENIPUNCTURE: CPT

## 2019-02-25 ENCOUNTER — OFFICE VISIT (OUTPATIENT)
Dept: CARDIOLOGY CLINIC | Age: 54
End: 2019-02-25
Payer: COMMERCIAL

## 2019-02-25 VITALS
HEIGHT: 64 IN | HEART RATE: 90 BPM | SYSTOLIC BLOOD PRESSURE: 116 MMHG | OXYGEN SATURATION: 97 % | DIASTOLIC BLOOD PRESSURE: 76 MMHG | BODY MASS INDEX: 36.96 KG/M2 | WEIGHT: 216.5 LBS

## 2019-02-25 DIAGNOSIS — I25.10 CORONARY ARTERY DISEASE INVOLVING NATIVE CORONARY ARTERY OF NATIVE HEART WITHOUT ANGINA PECTORIS: Primary | ICD-10-CM

## 2019-02-25 DIAGNOSIS — E87.6 HYPOKALEMIA: ICD-10-CM

## 2019-02-25 DIAGNOSIS — I25.5 ISCHEMIC CARDIOMYOPATHY: ICD-10-CM

## 2019-02-25 DIAGNOSIS — Z72.0 TOBACCO ABUSE: ICD-10-CM

## 2019-02-25 DIAGNOSIS — N18.30 TYPE 2 DIABETES MELLITUS WITH STAGE 3 CHRONIC KIDNEY DISEASE, WITH LONG-TERM CURRENT USE OF INSULIN (HCC): ICD-10-CM

## 2019-02-25 DIAGNOSIS — G47.33 OSA (OBSTRUCTIVE SLEEP APNEA): ICD-10-CM

## 2019-02-25 DIAGNOSIS — I10 ESSENTIAL HYPERTENSION: ICD-10-CM

## 2019-02-25 DIAGNOSIS — N18.30 CKD (CHRONIC KIDNEY DISEASE) STAGE 3, GFR 30-59 ML/MIN (HCC): ICD-10-CM

## 2019-02-25 DIAGNOSIS — I73.9 PVD (PERIPHERAL VASCULAR DISEASE) (HCC): ICD-10-CM

## 2019-02-25 DIAGNOSIS — Z79.4 TYPE 2 DIABETES MELLITUS WITH STAGE 3 CHRONIC KIDNEY DISEASE, WITH LONG-TERM CURRENT USE OF INSULIN (HCC): ICD-10-CM

## 2019-02-25 DIAGNOSIS — I50.22 CHRONIC SYSTOLIC CONGESTIVE HEART FAILURE (HCC): ICD-10-CM

## 2019-02-25 DIAGNOSIS — E11.22 TYPE 2 DIABETES MELLITUS WITH STAGE 3 CHRONIC KIDNEY DISEASE, WITH LONG-TERM CURRENT USE OF INSULIN (HCC): ICD-10-CM

## 2019-02-25 DIAGNOSIS — I34.0 MITRAL VALVE INSUFFICIENCY, UNSPECIFIED ETIOLOGY: ICD-10-CM

## 2019-02-25 DIAGNOSIS — E78.2 MIXED HYPERLIPIDEMIA: ICD-10-CM

## 2019-02-25 PROCEDURE — G8598 ASA/ANTIPLAT THER USED: HCPCS | Performed by: NURSE PRACTITIONER

## 2019-02-25 PROCEDURE — 99214 OFFICE O/P EST MOD 30 MIN: CPT | Performed by: NURSE PRACTITIONER

## 2019-02-25 PROCEDURE — G8484 FLU IMMUNIZE NO ADMIN: HCPCS | Performed by: NURSE PRACTITIONER

## 2019-02-25 PROCEDURE — 3017F COLORECTAL CA SCREEN DOC REV: CPT | Performed by: NURSE PRACTITIONER

## 2019-02-25 PROCEDURE — 1036F TOBACCO NON-USER: CPT | Performed by: NURSE PRACTITIONER

## 2019-02-25 PROCEDURE — 2022F DILAT RTA XM EVC RTNOPTHY: CPT | Performed by: NURSE PRACTITIONER

## 2019-02-25 PROCEDURE — 3046F HEMOGLOBIN A1C LEVEL >9.0%: CPT | Performed by: NURSE PRACTITIONER

## 2019-02-25 PROCEDURE — G8427 DOCREV CUR MEDS BY ELIG CLIN: HCPCS | Performed by: NURSE PRACTITIONER

## 2019-02-25 PROCEDURE — G8417 CALC BMI ABV UP PARAM F/U: HCPCS | Performed by: NURSE PRACTITIONER

## 2019-02-25 RX ORDER — SPIRONOLACTONE 100 MG/1
100 TABLET, FILM COATED ORAL DAILY
Qty: 30 TABLET | Refills: 5 | Status: SHIPPED | OUTPATIENT
Start: 2019-02-25 | End: 2019-03-13

## 2019-02-25 ASSESSMENT — ENCOUNTER SYMPTOMS
COUGH: 1
SHORTNESS OF BREATH: 1

## 2019-03-04 ENCOUNTER — HOSPITAL ENCOUNTER (OUTPATIENT)
Age: 54
Discharge: HOME | End: 2019-03-04
Payer: COMMERCIAL

## 2019-03-04 LAB
ANION GAP SERPL CALCULATED.3IONS-SCNC: 16 MMOL/L (ref 3–16)
BUN BLDV-MCNC: 34 MG/DL (ref 7–20)
CALCIUM SERPL-MCNC: 9.7 MG/DL (ref 8.3–10.6)
CHLORIDE BLD-SCNC: 80 MMOL/L (ref 99–110)
CO2: 35 MMOL/L (ref 21–32)
CREAT SERPL-MCNC: 1.2 MG/DL (ref 0.6–1.1)
GFR AFRICAN AMERICAN: 56
GFR NON-AFRICAN AMERICAN: 47
GLUCOSE BLD-MCNC: 381 MG/DL (ref 70–99)
POTASSIUM SERPL-SCNC: 3.3 MMOL/L (ref 3.5–5.1)
PRO-BNP: 835 PG/ML (ref 0–124)
SODIUM BLD-SCNC: 131 MMOL/L (ref 136–145)

## 2019-03-04 PROCEDURE — 80048 BASIC METABOLIC PNL TOTAL CA: CPT

## 2019-03-04 PROCEDURE — 36415 COLL VENOUS BLD VENIPUNCTURE: CPT

## 2019-03-04 PROCEDURE — 83880 ASSAY OF NATRIURETIC PEPTIDE: CPT

## 2019-03-06 ENCOUNTER — OFFICE VISIT (OUTPATIENT)
Dept: PULMONOLOGY | Age: 54
End: 2019-03-06
Payer: COMMERCIAL

## 2019-03-06 VITALS
WEIGHT: 226 LBS | HEIGHT: 65 IN | TEMPERATURE: 97.8 F | RESPIRATION RATE: 20 BRPM | BODY MASS INDEX: 37.65 KG/M2 | HEART RATE: 68 BPM | SYSTOLIC BLOOD PRESSURE: 122 MMHG | DIASTOLIC BLOOD PRESSURE: 62 MMHG | OXYGEN SATURATION: 96 %

## 2019-03-06 DIAGNOSIS — G47.33 OSA (OBSTRUCTIVE SLEEP APNEA): ICD-10-CM

## 2019-03-06 DIAGNOSIS — J42 CHRONIC BRONCHITIS, UNSPECIFIED CHRONIC BRONCHITIS TYPE (HCC): ICD-10-CM

## 2019-03-06 DIAGNOSIS — J98.4 RESTRICTIVE LUNG DISEASE: ICD-10-CM

## 2019-03-06 DIAGNOSIS — Z72.821 INADEQUATE SLEEP HYGIENE: ICD-10-CM

## 2019-03-06 DIAGNOSIS — R91.1 PULMONARY NODULE: Primary | ICD-10-CM

## 2019-03-06 PROCEDURE — 1036F TOBACCO NON-USER: CPT | Performed by: INTERNAL MEDICINE

## 2019-03-06 PROCEDURE — G8417 CALC BMI ABV UP PARAM F/U: HCPCS | Performed by: INTERNAL MEDICINE

## 2019-03-06 PROCEDURE — G8427 DOCREV CUR MEDS BY ELIG CLIN: HCPCS | Performed by: INTERNAL MEDICINE

## 2019-03-06 PROCEDURE — G8926 SPIRO NO PERF OR DOC: HCPCS | Performed by: INTERNAL MEDICINE

## 2019-03-06 PROCEDURE — 3017F COLORECTAL CA SCREEN DOC REV: CPT | Performed by: INTERNAL MEDICINE

## 2019-03-06 PROCEDURE — G8484 FLU IMMUNIZE NO ADMIN: HCPCS | Performed by: INTERNAL MEDICINE

## 2019-03-06 PROCEDURE — 3023F SPIROM DOC REV: CPT | Performed by: INTERNAL MEDICINE

## 2019-03-06 PROCEDURE — G8598 ASA/ANTIPLAT THER USED: HCPCS | Performed by: INTERNAL MEDICINE

## 2019-03-06 PROCEDURE — 99214 OFFICE O/P EST MOD 30 MIN: CPT | Performed by: INTERNAL MEDICINE

## 2019-03-06 RX ORDER — ALBUTEROL SULFATE 90 UG/1
2 AEROSOL, METERED RESPIRATORY (INHALATION) EVERY 6 HOURS PRN
Qty: 1 INHALER | Refills: 6 | Status: SHIPPED | OUTPATIENT
Start: 2019-03-06 | End: 2019-10-05 | Stop reason: SDUPTHER

## 2019-03-06 ASSESSMENT — SLEEP AND FATIGUE QUESTIONNAIRES
HOW LIKELY ARE YOU TO NOD OFF OR FALL ASLEEP IN A CAR, WHILE STOPPED FOR A FEW MINUTES IN TRAFFIC: 0
HOW LIKELY ARE YOU TO NOD OFF OR FALL ASLEEP WHILE LYING DOWN TO REST IN THE AFTERNOON WHEN CIRCUMSTANCES PERMIT: 2
HOW LIKELY ARE YOU TO NOD OFF OR FALL ASLEEP WHEN YOU ARE A PASSENGER IN A CAR FOR AN HOUR WITHOUT A BREAK: 3
ESS TOTAL SCORE: 15
HOW LIKELY ARE YOU TO NOD OFF OR FALL ASLEEP WHILE SITTING AND TALKING TO SOMEONE: 2
HOW LIKELY ARE YOU TO NOD OFF OR FALL ASLEEP WHILE SITTING QUIETLY AFTER LUNCH WITHOUT ALCOHOL: 2
NECK CIRCUMFERENCE (INCHES): 15.5
HOW LIKELY ARE YOU TO NOD OFF OR FALL ASLEEP WHILE SITTING INACTIVE IN A PUBLIC PLACE: 3
HOW LIKELY ARE YOU TO NOD OFF OR FALL ASLEEP WHILE WATCHING TV: 1
HOW LIKELY ARE YOU TO NOD OFF OR FALL ASLEEP WHILE SITTING AND READING: 2

## 2019-03-13 ENCOUNTER — HOSPITAL ENCOUNTER (OUTPATIENT)
Age: 54
Discharge: HOME | End: 2019-03-13
Payer: COMMERCIAL

## 2019-03-13 ENCOUNTER — OFFICE VISIT (OUTPATIENT)
Dept: CARDIOLOGY CLINIC | Age: 54
End: 2019-03-13
Payer: COMMERCIAL

## 2019-03-13 VITALS
HEIGHT: 64 IN | WEIGHT: 217 LBS | BODY MASS INDEX: 37.05 KG/M2 | DIASTOLIC BLOOD PRESSURE: 66 MMHG | HEART RATE: 77 BPM | SYSTOLIC BLOOD PRESSURE: 112 MMHG | OXYGEN SATURATION: 98 %

## 2019-03-13 DIAGNOSIS — I50.22 CHRONIC SYSTOLIC CONGESTIVE HEART FAILURE (HCC): ICD-10-CM

## 2019-03-13 DIAGNOSIS — E11.22 TYPE 2 DIABETES MELLITUS WITH STAGE 3 CHRONIC KIDNEY DISEASE, WITH LONG-TERM CURRENT USE OF INSULIN (HCC): ICD-10-CM

## 2019-03-13 DIAGNOSIS — Z72.0 TOBACCO ABUSE: ICD-10-CM

## 2019-03-13 DIAGNOSIS — Z79.4 TYPE 2 DIABETES MELLITUS WITH STAGE 3 CHRONIC KIDNEY DISEASE, WITH LONG-TERM CURRENT USE OF INSULIN (HCC): ICD-10-CM

## 2019-03-13 DIAGNOSIS — I10 ESSENTIAL HYPERTENSION: ICD-10-CM

## 2019-03-13 DIAGNOSIS — Z79.899 MEDICATION MANAGEMENT: ICD-10-CM

## 2019-03-13 DIAGNOSIS — I25.10 CORONARY ARTERY DISEASE INVOLVING NATIVE CORONARY ARTERY OF NATIVE HEART WITHOUT ANGINA PECTORIS: Primary | ICD-10-CM

## 2019-03-13 DIAGNOSIS — N18.30 TYPE 2 DIABETES MELLITUS WITH STAGE 3 CHRONIC KIDNEY DISEASE, WITH LONG-TERM CURRENT USE OF INSULIN (HCC): ICD-10-CM

## 2019-03-13 DIAGNOSIS — I25.5 ISCHEMIC CARDIOMYOPATHY: ICD-10-CM

## 2019-03-13 DIAGNOSIS — I34.0 MITRAL VALVE INSUFFICIENCY, UNSPECIFIED ETIOLOGY: ICD-10-CM

## 2019-03-13 DIAGNOSIS — I50.21 ACUTE SYSTOLIC CHF (CONGESTIVE HEART FAILURE), NYHA CLASS 4 (HCC): ICD-10-CM

## 2019-03-13 DIAGNOSIS — E78.2 MIXED HYPERLIPIDEMIA: ICD-10-CM

## 2019-03-13 DIAGNOSIS — N18.30 CKD (CHRONIC KIDNEY DISEASE) STAGE 3, GFR 30-59 ML/MIN (HCC): ICD-10-CM

## 2019-03-13 DIAGNOSIS — R00.2 PALPITATIONS: ICD-10-CM

## 2019-03-13 DIAGNOSIS — G47.33 OSA (OBSTRUCTIVE SLEEP APNEA): ICD-10-CM

## 2019-03-13 LAB
ANION GAP SERPL CALCULATED.3IONS-SCNC: 18 MMOL/L (ref 3–16)
BUN BLDV-MCNC: 38 MG/DL (ref 7–20)
CALCIUM SERPL-MCNC: 10.5 MG/DL (ref 8.3–10.6)
CHLORIDE BLD-SCNC: 80 MMOL/L (ref 99–110)
CO2: 32 MMOL/L (ref 21–32)
CREAT SERPL-MCNC: 1.4 MG/DL (ref 0.6–1.1)
GFR AFRICAN AMERICAN: 47
GFR NON-AFRICAN AMERICAN: 39
GLUCOSE BLD-MCNC: 498 MG/DL (ref 70–99)
POTASSIUM SERPL-SCNC: 4 MMOL/L (ref 3.5–5.1)
PRO-BNP: 282 PG/ML (ref 0–124)
SODIUM BLD-SCNC: 130 MMOL/L (ref 136–145)

## 2019-03-13 PROCEDURE — 2022F DILAT RTA XM EVC RTNOPTHY: CPT | Performed by: NURSE PRACTITIONER

## 2019-03-13 PROCEDURE — G8417 CALC BMI ABV UP PARAM F/U: HCPCS | Performed by: NURSE PRACTITIONER

## 2019-03-13 PROCEDURE — 3017F COLORECTAL CA SCREEN DOC REV: CPT | Performed by: NURSE PRACTITIONER

## 2019-03-13 PROCEDURE — 83880 ASSAY OF NATRIURETIC PEPTIDE: CPT

## 2019-03-13 PROCEDURE — 99214 OFFICE O/P EST MOD 30 MIN: CPT | Performed by: NURSE PRACTITIONER

## 2019-03-13 PROCEDURE — 36415 COLL VENOUS BLD VENIPUNCTURE: CPT

## 2019-03-13 PROCEDURE — 3046F HEMOGLOBIN A1C LEVEL >9.0%: CPT | Performed by: NURSE PRACTITIONER

## 2019-03-13 PROCEDURE — G8427 DOCREV CUR MEDS BY ELIG CLIN: HCPCS | Performed by: NURSE PRACTITIONER

## 2019-03-13 PROCEDURE — 80048 BASIC METABOLIC PNL TOTAL CA: CPT

## 2019-03-13 PROCEDURE — G8484 FLU IMMUNIZE NO ADMIN: HCPCS | Performed by: NURSE PRACTITIONER

## 2019-03-13 PROCEDURE — G8598 ASA/ANTIPLAT THER USED: HCPCS | Performed by: NURSE PRACTITIONER

## 2019-03-13 PROCEDURE — 4004F PT TOBACCO SCREEN RCVD TLK: CPT | Performed by: NURSE PRACTITIONER

## 2019-03-13 RX ORDER — SPIRONOLACTONE 100 MG/1
100 TABLET, FILM COATED ORAL 2 TIMES DAILY
Qty: 60 TABLET | Refills: 5 | Status: SHIPPED | OUTPATIENT
Start: 2019-03-13 | End: 2019-09-26 | Stop reason: SDUPTHER

## 2019-03-13 ASSESSMENT — ENCOUNTER SYMPTOMS
ABDOMINAL DISTENTION: 1
COUGH: 1
WHEEZING: 1
SHORTNESS OF BREATH: 1

## 2019-03-14 ENCOUNTER — TELEPHONE (OUTPATIENT)
Dept: CARDIOLOGY CLINIC | Age: 54
End: 2019-03-14

## 2019-03-14 DIAGNOSIS — I50.9 CONGESTIVE HEART FAILURE, UNSPECIFIED HF CHRONICITY, UNSPECIFIED HEART FAILURE TYPE (HCC): Primary | ICD-10-CM

## 2019-03-18 ENCOUNTER — TELEPHONE (OUTPATIENT)
Dept: CARDIOLOGY CLINIC | Age: 54
End: 2019-03-18

## 2019-04-02 ENCOUNTER — TELEPHONE (OUTPATIENT)
Dept: CARDIOLOGY CLINIC | Age: 54
End: 2019-04-02

## 2019-04-02 NOTE — TELEPHONE ENCOUNTER
Pt needs copies of all her cardiac meds and when she started them. Pt needs this for her [employer] disability , please call when ready to be picked up. Pt said she needs this asap.

## 2019-04-03 ENCOUNTER — HOSPITAL ENCOUNTER (OUTPATIENT)
Dept: NON INVASIVE DIAGNOSTICS | Age: 54
Discharge: HOME | End: 2019-04-03
Payer: COMMERCIAL

## 2019-04-03 ENCOUNTER — TELEPHONE (OUTPATIENT)
Dept: CARDIOLOGY CLINIC | Age: 54
End: 2019-04-03

## 2019-04-03 DIAGNOSIS — I25.5 ISCHEMIC CARDIOMYOPATHY: ICD-10-CM

## 2019-04-03 DIAGNOSIS — I34.0 MITRAL VALVE INSUFFICIENCY, UNSPECIFIED ETIOLOGY: ICD-10-CM

## 2019-04-03 DIAGNOSIS — I50.22 CHRONIC SYSTOLIC CONGESTIVE HEART FAILURE (HCC): ICD-10-CM

## 2019-04-03 DIAGNOSIS — I25.10 CORONARY ARTERY DISEASE INVOLVING NATIVE CORONARY ARTERY OF NATIVE HEART WITHOUT ANGINA PECTORIS: ICD-10-CM

## 2019-04-03 LAB
LV EF: 43 %
LVEF MODALITY: NORMAL

## 2019-04-03 PROCEDURE — 93306 TTE W/DOPPLER COMPLETE: CPT

## 2019-04-03 RX ORDER — DIGOXIN 125 MCG
125 TABLET ORAL DAILY
Qty: 30 TABLET | Refills: 3 | Status: ON HOLD | OUTPATIENT
Start: 2019-04-03 | End: 2020-01-31 | Stop reason: CLARIF

## 2019-04-03 RX ORDER — METOLAZONE 5 MG/1
TABLET ORAL
Qty: 30 TABLET | Refills: 1 | Status: SHIPPED | OUTPATIENT
Start: 2019-04-03 | End: 2019-05-28 | Stop reason: SDUPTHER

## 2019-04-03 NOTE — TELEPHONE ENCOUNTER
[employer] Pt  Last office visit 2/25/2019  Plan:   1. Stay on same doses of metolazone, torsemide and spironolactone  2. Continue carvedilol (Coreg) 3.125 mg twice daily  3. Start low dose Entresto 24-26 mg, half tablet once daily in PM  4. Repeat blood work in 1 week  5. Keep appointment with me as scheduled  6.  Will plan echocardiogram in 4-6 weeks     upcoming [employer] visit 4/10/19

## 2019-04-03 NOTE — TELEPHONE ENCOUNTER
EF improved from 30-35% to 40-45%. Reviewed with patient. She is feeling okay but still with lots of swelling. CardioMEMS pressures reviewed, PAD 23-31, -107 bpm.      Recommended addition of digoxin 125 mcg daily. Patient also reports severe leg pain, to have vascular testing per podiatrist.  Prior vascular surgeon was Dr. Alice Anderson, last seen > 1 year ago.      Nima Viveros, TRAY - CNP, 4/3/2019, 2:50 PM

## 2019-04-08 ENCOUNTER — HOSPITAL ENCOUNTER (INPATIENT)
Age: 54
LOS: 1 days | Discharge: HOME | DRG: 420 | End: 2019-04-09
Attending: EMERGENCY MEDICINE | Admitting: INTERNAL MEDICINE
Payer: COMMERCIAL

## 2019-04-08 ENCOUNTER — APPOINTMENT (OUTPATIENT)
Dept: GENERAL RADIOLOGY | Age: 54
DRG: 420 | End: 2019-04-08
Payer: COMMERCIAL

## 2019-04-08 DIAGNOSIS — N17.9 AKI (ACUTE KIDNEY INJURY) (HCC): ICD-10-CM

## 2019-04-08 DIAGNOSIS — E87.20 LACTIC ACIDOSIS: Primary | ICD-10-CM

## 2019-04-08 PROBLEM — E10.10 DKA, TYPE 1, NOT AT GOAL (HCC): Status: ACTIVE | Noted: 2019-04-08

## 2019-04-08 PROBLEM — E87.6 HYPOKALEMIA: Status: ACTIVE | Noted: 2019-04-08

## 2019-04-08 LAB
A/G RATIO: 0.9 (ref 1.1–2.2)
ALBUMIN SERPL-MCNC: 4.1 G/DL (ref 3.4–5)
ALP BLD-CCNC: 152 U/L (ref 40–129)
ALT SERPL-CCNC: 9 U/L (ref 10–40)
ANION GAP SERPL CALCULATED.3IONS-SCNC: 15 MMOL/L (ref 3–16)
ANION GAP SERPL CALCULATED.3IONS-SCNC: 18 MMOL/L (ref 3–16)
AST SERPL-CCNC: 10 U/L (ref 15–37)
BASOPHILS ABSOLUTE: 0.1 K/UL (ref 0–0.2)
BASOPHILS RELATIVE PERCENT: 0.4 %
BETA-HYDROXYBUTYRATE: 0.1 MMOL/L (ref 0–0.27)
BILIRUB SERPL-MCNC: 0.8 MG/DL (ref 0–1)
BUN BLDV-MCNC: 45 MG/DL (ref 7–20)
BUN BLDV-MCNC: 54 MG/DL (ref 7–20)
CALCIUM SERPL-MCNC: 10.2 MG/DL (ref 8.3–10.6)
CALCIUM SERPL-MCNC: 9 MG/DL (ref 8.3–10.6)
CHLORIDE BLD-SCNC: 67 MMOL/L (ref 99–110)
CHLORIDE BLD-SCNC: 77 MMOL/L (ref 99–110)
CHP ED QC CHECK: YES
CO2: 33 MMOL/L (ref 21–32)
CO2: 38 MMOL/L (ref 21–32)
CREAT SERPL-MCNC: 1.3 MG/DL (ref 0.6–1.1)
CREAT SERPL-MCNC: 1.7 MG/DL (ref 0.6–1.1)
DIGOXIN LEVEL: <0.3 NG/ML (ref 0.8–2)
EKG ATRIAL RATE: 104 BPM
EKG ATRIAL RATE: 88 BPM
EKG DIAGNOSIS: NORMAL
EKG DIAGNOSIS: NORMAL
EKG P AXIS: 63 DEGREES
EKG P AXIS: 73 DEGREES
EKG P-R INTERVAL: 164 MS
EKG P-R INTERVAL: 174 MS
EKG Q-T INTERVAL: 392 MS
EKG Q-T INTERVAL: 424 MS
EKG QRS DURATION: 148 MS
EKG QRS DURATION: 150 MS
EKG QTC CALCULATION (BAZETT): 513 MS
EKG QTC CALCULATION (BAZETT): 515 MS
EKG R AXIS: -65 DEGREES
EKG R AXIS: -70 DEGREES
EKG T AXIS: 50 DEGREES
EKG T AXIS: 70 DEGREES
EKG VENTRICULAR RATE: 104 BPM
EKG VENTRICULAR RATE: 88 BPM
EOSINOPHILS ABSOLUTE: 0.1 K/UL (ref 0–0.6)
EOSINOPHILS RELATIVE PERCENT: 0.4 %
GFR AFRICAN AMERICAN: 38
GFR AFRICAN AMERICAN: 51
GFR NON-AFRICAN AMERICAN: 31
GFR NON-AFRICAN AMERICAN: 42
GLOBULIN: 4.5 G/DL
GLUCOSE BLD-MCNC: 236 MG/DL (ref 70–99)
GLUCOSE BLD-MCNC: 268 MG/DL (ref 70–99)
GLUCOSE BLD-MCNC: 283 MG/DL (ref 70–99)
GLUCOSE BLD-MCNC: 285 MG/DL (ref 70–99)
GLUCOSE BLD-MCNC: 322 MG/DL (ref 70–99)
GLUCOSE BLD-MCNC: 392 MG/DL (ref 70–99)
GLUCOSE BLD-MCNC: 422 MG/DL (ref 70–99)
GLUCOSE BLD-MCNC: 482 MG/DL
GLUCOSE BLD-MCNC: 482 MG/DL (ref 70–99)
GLUCOSE BLD-MCNC: 610 MG/DL (ref 70–99)
HCT VFR BLD CALC: 46.2 % (ref 36–48)
HEMOGLOBIN: 15.8 G/DL (ref 12–16)
LACTIC ACID: 1.8 MMOL/L (ref 0.4–2)
LACTIC ACID: 2.8 MMOL/L (ref 0.4–2)
LACTIC ACID: 3 MMOL/L (ref 0.4–2)
LIPASE: 31 U/L (ref 13–60)
LYMPHOCYTES ABSOLUTE: 0.8 K/UL (ref 1–5.1)
LYMPHOCYTES RELATIVE PERCENT: 5.8 %
MAGNESIUM: 2.1 MG/DL (ref 1.8–2.4)
MCH RBC QN AUTO: 29.8 PG (ref 26–34)
MCHC RBC AUTO-ENTMCNC: 34.2 G/DL (ref 31–36)
MCV RBC AUTO: 87.1 FL (ref 80–100)
MONOCYTES ABSOLUTE: 0.7 K/UL (ref 0–1.3)
MONOCYTES RELATIVE PERCENT: 4.7 %
NEUTROPHILS ABSOLUTE: 13 K/UL (ref 1.7–7.7)
NEUTROPHILS RELATIVE PERCENT: 88.7 %
PDW BLD-RTO: 13.9 % (ref 12.4–15.4)
PERFORMED ON: ABNORMAL
PHOSPHORUS: 2.4 MG/DL (ref 2.5–4.9)
PLATELET # BLD: 379 K/UL (ref 135–450)
PMV BLD AUTO: 9.7 FL (ref 5–10.5)
POTASSIUM SERPL-SCNC: 2.6 MMOL/L (ref 3.5–5.1)
POTASSIUM SERPL-SCNC: 3 MMOL/L (ref 3.5–5.1)
RAPID INFLUENZA  B AGN: NEGATIVE
RAPID INFLUENZA A AGN: NEGATIVE
RBC # BLD: 5.31 M/UL (ref 4–5.2)
SODIUM BLD-SCNC: 123 MMOL/L (ref 136–145)
SODIUM BLD-SCNC: 125 MMOL/L (ref 136–145)
TOTAL PROTEIN: 8.6 G/DL (ref 6.4–8.2)
TROPONIN: <0.01 NG/ML
WBC # BLD: 14.7 K/UL (ref 4–11)

## 2019-04-08 PROCEDURE — 84484 ASSAY OF TROPONIN QUANT: CPT

## 2019-04-08 PROCEDURE — 99291 CRITICAL CARE FIRST HOUR: CPT | Performed by: INTERNAL MEDICINE

## 2019-04-08 PROCEDURE — 6360000002 HC RX W HCPCS: Performed by: EMERGENCY MEDICINE

## 2019-04-08 PROCEDURE — 80053 COMPREHEN METABOLIC PANEL: CPT

## 2019-04-08 PROCEDURE — 93005 ELECTROCARDIOGRAM TRACING: CPT | Performed by: EMERGENCY MEDICINE

## 2019-04-08 PROCEDURE — 94640 AIRWAY INHALATION TREATMENT: CPT

## 2019-04-08 PROCEDURE — 83690 ASSAY OF LIPASE: CPT

## 2019-04-08 PROCEDURE — 2580000003 HC RX 258: Performed by: PHYSICIAN ASSISTANT

## 2019-04-08 PROCEDURE — 85025 COMPLETE CBC W/AUTO DIFF WBC: CPT

## 2019-04-08 PROCEDURE — 6370000000 HC RX 637 (ALT 250 FOR IP): Performed by: PHYSICIAN ASSISTANT

## 2019-04-08 PROCEDURE — 93005 ELECTROCARDIOGRAM TRACING: CPT | Performed by: PHYSICIAN ASSISTANT

## 2019-04-08 PROCEDURE — 6370000000 HC RX 637 (ALT 250 FOR IP): Performed by: INTERNAL MEDICINE

## 2019-04-08 PROCEDURE — 6360000002 HC RX W HCPCS: Performed by: PHYSICIAN ASSISTANT

## 2019-04-08 PROCEDURE — 83605 ASSAY OF LACTIC ACID: CPT

## 2019-04-08 PROCEDURE — 93010 ELECTROCARDIOGRAM REPORT: CPT | Performed by: INTERNAL MEDICINE

## 2019-04-08 PROCEDURE — 83735 ASSAY OF MAGNESIUM: CPT

## 2019-04-08 PROCEDURE — 87804 INFLUENZA ASSAY W/OPTIC: CPT

## 2019-04-08 PROCEDURE — 80162 ASSAY OF DIGOXIN TOTAL: CPT

## 2019-04-08 PROCEDURE — 6370000000 HC RX 637 (ALT 250 FOR IP): Performed by: EMERGENCY MEDICINE

## 2019-04-08 PROCEDURE — 83930 ASSAY OF BLOOD OSMOLALITY: CPT

## 2019-04-08 PROCEDURE — 36415 COLL VENOUS BLD VENIPUNCTURE: CPT

## 2019-04-08 PROCEDURE — 2580000003 HC RX 258: Performed by: EMERGENCY MEDICINE

## 2019-04-08 PROCEDURE — 6360000002 HC RX W HCPCS: Performed by: INTERNAL MEDICINE

## 2019-04-08 PROCEDURE — 71045 X-RAY EXAM CHEST 1 VIEW: CPT

## 2019-04-08 PROCEDURE — 82010 KETONE BODYS QUAN: CPT

## 2019-04-08 PROCEDURE — 99285 EMERGENCY DEPT VISIT HI MDM: CPT

## 2019-04-08 PROCEDURE — 2500000003 HC RX 250 WO HCPCS: Performed by: PHYSICIAN ASSISTANT

## 2019-04-08 PROCEDURE — 96365 THER/PROPH/DIAG IV INF INIT: CPT

## 2019-04-08 PROCEDURE — 96368 THER/DIAG CONCURRENT INF: CPT

## 2019-04-08 PROCEDURE — 84100 ASSAY OF PHOSPHORUS: CPT

## 2019-04-08 PROCEDURE — 94761 N-INVAS EAR/PLS OXIMETRY MLT: CPT

## 2019-04-08 PROCEDURE — 96375 TX/PRO/DX INJ NEW DRUG ADDON: CPT

## 2019-04-08 PROCEDURE — 87040 BLOOD CULTURE FOR BACTERIA: CPT

## 2019-04-08 PROCEDURE — 2000000000 HC ICU R&B

## 2019-04-08 RX ORDER — SODIUM CHLORIDE 9 MG/ML
INJECTION, SOLUTION INTRAVENOUS ONCE
Status: COMPLETED | OUTPATIENT
Start: 2019-04-08 | End: 2019-04-08

## 2019-04-08 RX ORDER — INSULIN GLARGINE 100 [IU]/ML
28 INJECTION, SOLUTION SUBCUTANEOUS 2 TIMES DAILY
Status: DISCONTINUED | OUTPATIENT
Start: 2019-04-08 | End: 2019-04-09

## 2019-04-08 RX ORDER — CARVEDILOL 3.12 MG/1
3.12 TABLET ORAL 2 TIMES DAILY WITH MEALS
Status: DISCONTINUED | OUTPATIENT
Start: 2019-04-08 | End: 2019-04-09 | Stop reason: HOSPADM

## 2019-04-08 RX ORDER — BUPRENORPHINE HYDROCHLORIDE AND NALOXONE HYDROCHLORIDE DIHYDRATE 8; 2 MG/1; MG/1
2 TABLET SUBLINGUAL DAILY
Status: DISCONTINUED | OUTPATIENT
Start: 2019-04-08 | End: 2019-04-09 | Stop reason: HOSPADM

## 2019-04-08 RX ORDER — ALBUTEROL SULFATE 2.5 MG/3ML
2.5 SOLUTION RESPIRATORY (INHALATION) 4 TIMES DAILY
Status: DISCONTINUED | OUTPATIENT
Start: 2019-04-08 | End: 2019-04-09

## 2019-04-08 RX ORDER — IPRATROPIUM BROMIDE AND ALBUTEROL SULFATE 2.5; .5 MG/3ML; MG/3ML
1 SOLUTION RESPIRATORY (INHALATION) ONCE
Status: COMPLETED | OUTPATIENT
Start: 2019-04-08 | End: 2019-04-08

## 2019-04-08 RX ORDER — BUSPIRONE HYDROCHLORIDE 7.5 MG/1
30 TABLET ORAL 3 TIMES DAILY
Status: DISCONTINUED | OUTPATIENT
Start: 2019-04-08 | End: 2019-04-09 | Stop reason: HOSPADM

## 2019-04-08 RX ORDER — POTASSIUM CHLORIDE 7.45 MG/ML
10 INJECTION INTRAVENOUS PRN
Status: DISCONTINUED | OUTPATIENT
Start: 2019-04-08 | End: 2019-04-09 | Stop reason: HOSPADM

## 2019-04-08 RX ORDER — DOXYCYCLINE HYCLATE 100 MG
100 TABLET ORAL EVERY 12 HOURS SCHEDULED
Status: DISCONTINUED | OUTPATIENT
Start: 2019-04-08 | End: 2019-04-09 | Stop reason: HOSPADM

## 2019-04-08 RX ORDER — ALBUTEROL SULFATE 90 UG/1
2 AEROSOL, METERED RESPIRATORY (INHALATION) EVERY 6 HOURS PRN
Status: DISCONTINUED | OUTPATIENT
Start: 2019-04-08 | End: 2019-04-09

## 2019-04-08 RX ORDER — MAGNESIUM SULFATE 1 G/100ML
1 INJECTION INTRAVENOUS PRN
Status: DISCONTINUED | OUTPATIENT
Start: 2019-04-08 | End: 2019-04-08

## 2019-04-08 RX ORDER — ONDANSETRON 2 MG/ML
4 INJECTION INTRAMUSCULAR; INTRAVENOUS ONCE
Status: COMPLETED | OUTPATIENT
Start: 2019-04-08 | End: 2019-04-08

## 2019-04-08 RX ORDER — DEXTROSE MONOHYDRATE 25 G/50ML
12.5 INJECTION, SOLUTION INTRAVENOUS PRN
Status: DISCONTINUED | OUTPATIENT
Start: 2019-04-08 | End: 2019-04-08

## 2019-04-08 RX ORDER — NITROGLYCERIN 0.4 MG/1
0.4 TABLET SUBLINGUAL EVERY 5 MIN PRN
Status: DISCONTINUED | OUTPATIENT
Start: 2019-04-08 | End: 2019-04-09 | Stop reason: HOSPADM

## 2019-04-08 RX ORDER — ATORVASTATIN CALCIUM 40 MG/1
80 TABLET, FILM COATED ORAL NIGHTLY
Status: DISCONTINUED | OUTPATIENT
Start: 2019-04-08 | End: 2019-04-09 | Stop reason: HOSPADM

## 2019-04-08 RX ORDER — DOXEPIN HYDROCHLORIDE 25 MG/1
50 CAPSULE ORAL NIGHTLY
Status: DISCONTINUED | OUTPATIENT
Start: 2019-04-08 | End: 2019-04-09 | Stop reason: HOSPADM

## 2019-04-08 RX ORDER — POTASSIUM CHLORIDE 750 MG/1
40 TABLET, EXTENDED RELEASE ORAL 3 TIMES DAILY
Status: DISCONTINUED | OUTPATIENT
Start: 2019-04-08 | End: 2019-04-09 | Stop reason: HOSPADM

## 2019-04-08 RX ORDER — PANTOPRAZOLE SODIUM 40 MG/1
40 TABLET, DELAYED RELEASE ORAL
Status: DISCONTINUED | OUTPATIENT
Start: 2019-04-08 | End: 2019-04-09 | Stop reason: HOSPADM

## 2019-04-08 RX ORDER — SODIUM CHLORIDE 450 MG/100ML
INJECTION, SOLUTION INTRAVENOUS CONTINUOUS
Status: DISCONTINUED | OUTPATIENT
Start: 2019-04-08 | End: 2019-04-08

## 2019-04-08 RX ORDER — METOLAZONE 2.5 MG/1
5 TABLET ORAL DAILY
Status: DISCONTINUED | OUTPATIENT
Start: 2019-04-08 | End: 2019-04-09 | Stop reason: HOSPADM

## 2019-04-08 RX ORDER — LEVOFLOXACIN 5 MG/ML
500 INJECTION, SOLUTION INTRAVENOUS ONCE
Status: COMPLETED | OUTPATIENT
Start: 2019-04-08 | End: 2019-04-08

## 2019-04-08 RX ORDER — ARIPIPRAZOLE 10 MG/1
5 TABLET ORAL DAILY
Status: DISCONTINUED | OUTPATIENT
Start: 2019-04-08 | End: 2019-04-09 | Stop reason: HOSPADM

## 2019-04-08 RX ORDER — METFORMIN HYDROCHLORIDE 500 MG/1
1000 TABLET, EXTENDED RELEASE ORAL 2 TIMES DAILY
Status: DISCONTINUED | OUTPATIENT
Start: 2019-04-08 | End: 2019-04-09 | Stop reason: HOSPADM

## 2019-04-08 RX ORDER — POTASSIUM CHLORIDE 7.45 MG/ML
10 INJECTION INTRAVENOUS PRN
Status: DISCONTINUED | OUTPATIENT
Start: 2019-04-08 | End: 2019-04-08

## 2019-04-08 RX ORDER — DEXTROSE AND SODIUM CHLORIDE 5; .45 G/100ML; G/100ML
INJECTION, SOLUTION INTRAVENOUS CONTINUOUS PRN
Status: DISCONTINUED | OUTPATIENT
Start: 2019-04-08 | End: 2019-04-08

## 2019-04-08 RX ORDER — LAMOTRIGINE 100 MG/1
150 TABLET ORAL 2 TIMES DAILY
Status: DISCONTINUED | OUTPATIENT
Start: 2019-04-08 | End: 2019-04-09 | Stop reason: HOSPADM

## 2019-04-08 RX ORDER — DEXTROSE MONOHYDRATE 25 G/50ML
12.5 INJECTION, SOLUTION INTRAVENOUS PRN
Status: DISCONTINUED | OUTPATIENT
Start: 2019-04-08 | End: 2019-04-09 | Stop reason: HOSPADM

## 2019-04-08 RX ORDER — SODIUM CHLORIDE 9 MG/ML
INJECTION, SOLUTION INTRAVENOUS CONTINUOUS
Status: DISCONTINUED | OUTPATIENT
Start: 2019-04-08 | End: 2019-04-08

## 2019-04-08 RX ORDER — DIGOXIN 125 MCG
125 TABLET ORAL DAILY
Status: DISCONTINUED | OUTPATIENT
Start: 2019-04-08 | End: 2019-04-09 | Stop reason: HOSPADM

## 2019-04-08 RX ORDER — SPIRONOLACTONE 100 MG/1
100 TABLET, FILM COATED ORAL 2 TIMES DAILY
Status: DISCONTINUED | OUTPATIENT
Start: 2019-04-08 | End: 2019-04-09 | Stop reason: HOSPADM

## 2019-04-08 RX ORDER — TORSEMIDE 100 MG/1
50 TABLET ORAL DAILY
Status: DISCONTINUED | OUTPATIENT
Start: 2019-04-09 | End: 2019-04-09 | Stop reason: HOSPADM

## 2019-04-08 RX ORDER — ASPIRIN 81 MG/1
81 TABLET ORAL DAILY
Status: DISCONTINUED | OUTPATIENT
Start: 2019-04-08 | End: 2019-04-09 | Stop reason: HOSPADM

## 2019-04-08 RX ORDER — MAGNESIUM SULFATE 1 G/100ML
1 INJECTION INTRAVENOUS PRN
Status: DISCONTINUED | OUTPATIENT
Start: 2019-04-08 | End: 2019-04-09 | Stop reason: HOSPADM

## 2019-04-08 RX ORDER — LEVOFLOXACIN 5 MG/ML
250 INJECTION, SOLUTION INTRAVENOUS EVERY 24 HOURS
Status: DISCONTINUED | OUTPATIENT
Start: 2019-04-09 | End: 2019-04-08

## 2019-04-08 RX ADMIN — BUPRENORPHINE HYDROCHLORIDE AND NALOXONE HYDROCHLORIDE DIHYDRATE 2 TABLET: 8; 2 TABLET SUBLINGUAL at 18:33

## 2019-04-08 RX ADMIN — DIGOXIN 125 MCG: 125 TABLET ORAL at 18:32

## 2019-04-08 RX ADMIN — POTASSIUM CHLORIDE 10 MEQ: 7.46 INJECTION, SOLUTION INTRAVENOUS at 13:41

## 2019-04-08 RX ADMIN — INSULIN LISPRO 10 UNITS: 100 INJECTION, SOLUTION INTRAVENOUS; SUBCUTANEOUS at 18:42

## 2019-04-08 RX ADMIN — LEVOFLOXACIN 500 MG: 5 INJECTION, SOLUTION INTRAVENOUS at 13:32

## 2019-04-08 RX ADMIN — ENOXAPARIN SODIUM 40 MG: 40 INJECTION SUBCUTANEOUS at 18:33

## 2019-04-08 RX ADMIN — INSULIN LISPRO 2 UNITS: 100 INJECTION, SOLUTION INTRAVENOUS; SUBCUTANEOUS at 18:41

## 2019-04-08 RX ADMIN — POTASSIUM CHLORIDE 40 MEQ: 10 TABLET, EXTENDED RELEASE ORAL at 22:46

## 2019-04-08 RX ADMIN — BUSPIRONE HYDROCHLORIDE 30 MG: 7.5 TABLET ORAL at 21:36

## 2019-04-08 RX ADMIN — SACUBITRIL AND VALSARTAN 0.5 TABLET: 24; 26 TABLET, FILM COATED ORAL at 18:37

## 2019-04-08 RX ADMIN — POTASSIUM CHLORIDE 10 MEQ: 7.46 INJECTION, SOLUTION INTRAVENOUS at 18:27

## 2019-04-08 RX ADMIN — NYSTATIN 500000 UNITS: 100000 SUSPENSION ORAL at 21:34

## 2019-04-08 RX ADMIN — PANTOPRAZOLE SODIUM 40 MG: 40 TABLET, DELAYED RELEASE ORAL at 18:33

## 2019-04-08 RX ADMIN — POTASSIUM CHLORIDE 10 MEQ: 7.46 INJECTION, SOLUTION INTRAVENOUS at 20:54

## 2019-04-08 RX ADMIN — INSULIN GLARGINE 28 UNITS: 100 INJECTION, SOLUTION SUBCUTANEOUS at 21:42

## 2019-04-08 RX ADMIN — SODIUM CHLORIDE 0.1 UNITS/KG/HR: 9 INJECTION, SOLUTION INTRAVENOUS at 13:33

## 2019-04-08 RX ADMIN — SODIUM CHLORIDE: 9 INJECTION, SOLUTION INTRAVENOUS at 14:20

## 2019-04-08 RX ADMIN — POTASSIUM CHLORIDE 10 MEQ: 7.46 INJECTION, SOLUTION INTRAVENOUS at 15:00

## 2019-04-08 RX ADMIN — POTASSIUM CHLORIDE 10 MEQ: 7.46 INJECTION, SOLUTION INTRAVENOUS at 17:29

## 2019-04-08 RX ADMIN — LAMOTRIGINE 150 MG: 100 TABLET ORAL at 21:30

## 2019-04-08 RX ADMIN — SODIUM PHOSPHATE, MONOBASIC, MONOHYDRATE 10 MMOL: 276; 142 INJECTION, SOLUTION INTRAVENOUS at 18:29

## 2019-04-08 RX ADMIN — SODIUM CHLORIDE: 9 INJECTION, SOLUTION INTRAVENOUS at 12:51

## 2019-04-08 RX ADMIN — ONDANSETRON 4 MG: 2 INJECTION INTRAMUSCULAR; INTRAVENOUS at 13:43

## 2019-04-08 RX ADMIN — DOXEPIN HYDROCHLORIDE 50 MG: 25 CAPSULE ORAL at 21:30

## 2019-04-08 RX ADMIN — INSULIN HUMAN 10 UNITS: 100 INJECTION, SOLUTION PARENTERAL at 12:50

## 2019-04-08 RX ADMIN — POTASSIUM CHLORIDE 10 MEQ: 7.46 INJECTION, SOLUTION INTRAVENOUS at 19:42

## 2019-04-08 RX ADMIN — ASPIRIN 81 MG: 81 TABLET, COATED ORAL at 18:30

## 2019-04-08 RX ADMIN — SODIUM CHLORIDE 9.98 UNITS/HR: 9 INJECTION, SOLUTION INTRAVENOUS at 17:20

## 2019-04-08 RX ADMIN — METFORMIN HYDROCHLORIDE 1000 MG: 500 TABLET, EXTENDED RELEASE ORAL at 21:37

## 2019-04-08 RX ADMIN — ARIPIPRAZOLE 5 MG: 10 TABLET ORAL at 18:31

## 2019-04-08 RX ADMIN — IPRATROPIUM BROMIDE AND ALBUTEROL SULFATE 1 AMPULE: .5; 3 SOLUTION RESPIRATORY (INHALATION) at 12:02

## 2019-04-08 RX ADMIN — SODIUM CHLORIDE: 4.5 INJECTION, SOLUTION INTRAVENOUS at 17:32

## 2019-04-08 RX ADMIN — ATORVASTATIN CALCIUM 80 MG: 40 TABLET, FILM COATED ORAL at 21:31

## 2019-04-08 RX ADMIN — METOLAZONE 5 MG: 2.5 TABLET ORAL at 18:31

## 2019-04-08 RX ADMIN — DOXYCYCLINE HYCLATE 100 MG: 100 TABLET, COATED ORAL at 21:42

## 2019-04-08 RX ADMIN — INSULIN LISPRO 2 UNITS: 100 INJECTION, SOLUTION INTRAVENOUS; SUBCUTANEOUS at 21:43

## 2019-04-08 RX ADMIN — SPIRONOLACTONE 100 MG: 100 TABLET, FILM COATED ORAL at 21:34

## 2019-04-08 RX ADMIN — CARVEDILOL 3.12 MG: 3.12 TABLET, FILM COATED ORAL at 18:29

## 2019-04-08 RX ADMIN — ALBUTEROL SULFATE 2.5 MG: 2.5 SOLUTION RESPIRATORY (INHALATION) at 18:41

## 2019-04-08 ASSESSMENT — PAIN DESCRIPTION - PAIN TYPE
TYPE: ACUTE PAIN
TYPE: CHRONIC PAIN

## 2019-04-08 ASSESSMENT — PAIN DESCRIPTION - LOCATION
LOCATION: LEG
LOCATION: LEG
LOCATION: CHEST

## 2019-04-08 ASSESSMENT — PAIN SCALES - GENERAL
PAINLEVEL_OUTOF10: 4
PAINLEVEL_OUTOF10: 4
PAINLEVEL_OUTOF10: 8
PAINLEVEL_OUTOF10: 6
PAINLEVEL_OUTOF10: 6

## 2019-04-08 ASSESSMENT — ENCOUNTER SYMPTOMS
VOMITING: 0
SORE THROAT: 0
ABDOMINAL PAIN: 0
COUGH: 1
NAUSEA: 0

## 2019-04-08 ASSESSMENT — PAIN DESCRIPTION - ORIENTATION: ORIENTATION: RIGHT;LEFT

## 2019-04-08 ASSESSMENT — PAIN DESCRIPTION - DESCRIPTORS: DESCRIPTORS: SORE

## 2019-04-08 NOTE — H&P
History and Physical      Chief Complaint   Patient presents with    Chest Pain     States cough and chest pain started 2 days ago. States she gets nausous while coughing up mucus. States she has a thick, brownish-green mucus. HISTORY OF PRESENT ILLNESS:     52year old white female who has DM/CAD/CHF who came to the ER with some cough and chest pain. She had nausea and emesis. No chest pain. Work up showed DKA. With insulin drip per DKA is resolved. Repeat hydroxybutyrate is normal. She had complaints of a cough with some yellow sputum. No fever or chills. She is complaining of having some swelling in her legs and her abdomen. She does have heart failure and takes diuretics. Patient is allergic to lisinopril.     Past Medical History:   Diagnosis Date    Anxiety and depression     CAD (coronary artery disease) 01/2018    Cardiomyopathy ([address]     [facility]est[facility]     COPD (chronic obstructive pulmonary disease) (HCC)     Diabetes mellitus ([address]     GERD (gastroesophageal reflux disease)     Hyperlipidemia     Hypertension     Peripheral neuropathy     Peripheral vascular disease (HCC)     Pulmonary HTN ([address]     Reflux     Sleep apnea     has never done sleep study;does not use CPAP    Wears glasses     for reading       Past Surgical History:   Procedure Laterality Date    ARTERIAL BYPASS SURGRY Left 01/06/2016    Axillary/Subclavian to Brachial Bypass w/reversed SVG    CARDIAC CATHETERIZATION  03/27/2018    Dr. Aditi Washington - at [facility] [address]      pancreatitis post surgery    CORONARY ANGIOPLASTY WITH STENT PLACEMENT  03/16/2018    MELODY- 3.5 x 38 and 3.5 x 20 to Cx    CORONARY ANGIOPLASTY WITH STENT PLACEMENT  01/03/2018    MELOYD- 3.0 x 38 and 2.75 x 26 and 2.75 x 8 and 2.75 x 22 to the LAD    INSERTABLE CARDIAC MONITOR  02/14/2019    CardioMEMs insertion for CHF    ROTATOR CUFF REPAIR Left 04/22/2016    TONSILLECTOMY      TUMOR EXCISION      benign tumors X 2 chest & axilla    UPPER GASTROINTESTINAL ENDOSCOPY  2013    BX barretts, HH, gastritis    UPPER GASTROINTESTINAL ENDOSCOPY  12/10/2015    UPPER GASTROINTESTINAL ENDOSCOPY  2017    Gastritis       Scheduled Meds:      Continuous Infusions:   insulin (HUMAN R) non-weight based infusion 0.05 Units/kg/hr (19 1422)    sodium chloride 250 mL/hr at 19 1420    dextrose 5 % and 0.45 % NaCl         PRN Meds:  dextrose, potassium chloride, magnesium sulfate, sodium phosphate IVPB **OR** sodium phosphate IVPB **OR** sodium phosphate IVPB, dextrose 5 % and 0.45 % NaCl       reports that she has been smoking cigarettes. She has a 7.50 pack-year smoking history.  She has never used smokeless tobacco.    Family History   Problem Relation Age of Onset    Heart Disease Maternal Grandmother     Cancer Mother         lung and brain cancer    Cancer Maternal Aunt         lung and brain cancer       Social History     Socioeconomic History    Marital status: Single     Spouse name: None    Number of children: None    Years of education: None    Highest education level: None   Occupational History    None   Social Needs    Financial resource strain: None    Food insecurity:     Worry: None     Inability: None    Transportation needs:     Medical: None     Non-medical: None   Tobacco Use    Smoking status: Current Every Day Smoker     Packs/day: 0.25     Years: 30.00     Pack years: 7.50     Types: Cigarettes     Last attempt to quit: 2019     Years since quittin.2    Smokeless tobacco: Never Used    Tobacco comment: 4 per day   Substance and Sexual Activity    Alcohol use: No    Drug use: No    Sexual activity: Not Currently   Lifestyle    Physical activity:     Days per week: None     Minutes per session: None    Stress: None   Relationships    Social connections:     Talks on phone: None     Gets together: None     Attends Restorationist service: None     Active member of club or organization: None     Attends meetings of clubs or organizations: None     Relationship status: None    Intimate partner violence:     Fear of current or ex partner: None     Emotionally abused: None     Physically abused: None     Forced sexual activity: None   Other Topics Concern    None   Social History Narrative    None     REVIEW OF SYSTEMS:   Constitutional: Negative for fever   HENT: Negative for sore throat   Eyes: Negative for redness   Respiratory: Negative for dyspnea, cough   Cardiovascular: Negative for chest pain   Gastrointestinal: see HPI   Genitourinary: Negative for hematuria   Musculoskeletal: Negative for arthralgias   Skin: Negative for rash   Neurological: Negative for syncope   Hematological: Negative for adenopathy   Psychiatric/Behavorial: Negative for anxiety    VS:   /76   Pulse 93   Temp 98.6 °F (37 °C) (Oral)   Resp 14   Ht 5' 4\" (1.626 m)   Wt 220 lb (99.8 kg)   LMP 10/24/2012   SpO2 95%   BMI 37.76 kg/m²     Gen: No distress. Alert. Eyes: PERRL. No sclera icterus. No conjunctival injection. ENT: No discharge. Pharynx clear. Neck: Trachea midline. Normal thyroid. Resp: No accessory muscle use. No crackles. No wheezes. No rhonchi. No dullness on percussion. CV: Regular rate. Regular rhythm. No murmur or rub. + edema. GI: Non-tender. Non-distended. No masses. No organomegaly. Normal bowel sounds. No hernia. Skin: Warm and dry. No nodule on exposed extremities. No rash on exposed extremities. Lymph: No cervical LAD. No supraclavicular LAD. M/S: No cyanosis. No joint deformity. No clubbing. Neuro: Awake. Reflexes 2+ symmetric bilaterally. Moves all 4 extremities, non focal  Psych: Oriented x 3. No anxiety or agitation.      CBC:   Recent Labs     04/08/19  1129   WBC 14.7*   HGB 15.8   HCT 46.2   MCV 87.1        BMP:   Recent Labs     04/08/19  1129   *   K 3.0*   CL 67*   CO2 38*   BUN 54*   CREATININE 1.7* LIVER PROFILE:   Recent Labs     04/08/19  1129   AST 10*   ALT 9*   LIPASE 31.0   BILITOT 0.8   ALKPHOS 152*       XR CHEST PORTABLE   Final Result   No acute focal airspace consolidation. ASSESSMENT:    Principal Problem:    DKA, type 1, not at goal [facility])  Active Problems:    Coronary artery disease involving native coronary artery of native heart without angina pectoris    HTN (hypertension)    GERD (gastroesophageal reflux disease)    Hyperlipidemia    Anxiety and depression    Chronic systolic congestive heart failure (HCC)    CKD (chronic kidney disease) stage 3, GFR 30-59 ml/min ([facility])    Hypokalemia  Resolved Problems:    * No resolved hospital problems. *      PLAN:    #  DKA. Workup was consistent with mild DKA. With insulin drip and IV fluids this is already resolved. I will start her on a carb controlled diet. Will resume insulin. #  Hypokalemia will replace. Use K replacement protocol. #  Pseudohyponatremia: due to hyperglycemia. #  CAD stable. #  Chronic systolic CHF. Will resume diuretics. #  HTN well controlled. #  CKD: monitor creatinine. #  Lovenox for DVT prophylaxis. 33 minutes of CCT spent in evaluation, documentation, reviewing of data and discussing with consultants. All questions and concerns were addressed to the patient/family. Alternatives to my treatment were discussed. The note was completed using EMR. Every effort was made to ensure accuracy; however, inadvertent computerized transcription errors may be present.                   Kassidy Lester 4/8/2019 4:27 PM

## 2019-04-08 NOTE — ED NOTES
Report given to [employer] in sbar. No questions at this time. Pt aware of room number and pt and family deny questions at this time.      Naren Mercado RN  04/08/19 0722

## 2019-04-08 NOTE — ED PROVIDER NOTES
HISTORY  family history includes Cancer in her maternal aunt and mother; Heart Disease in her maternal grandmother. SOCIAL HISTORY   reports that she has been smoking cigarettes. She has a 7.50 pack-year smoking history. She has never used smokeless tobacco. She reports that she does not drink alcohol or use drugs. HOME MEDICATIONS     Prior to Admission medications    Medication Sig Start Date End Date Taking?  Authorizing Provider   metolazone (ZAROXOLYN) 5 MG tablet TAKE 1 TABLET BY MOUTH EVERY DAY 4/3/19   TRAY Louise CNP   digoxin (LANOXIN) 125 MCG tablet Take 1 tablet by mouth daily 4/3/19   TRAY Louise CNP   spironolactone (ALDACTONE) 100 MG tablet Take 1 tablet by mouth 2 times daily 3/13/19   TRAY Louise CNP   albuterol sulfate HFA (VENTOLIN HFA) 108 (90 Base) MCG/ACT inhaler Inhale 2 puffs into the lungs every 6 hours as needed for Wheezing or Shortness of Breath 3/6/19   Melissa Mcarthur MD   sacubitril-valsartan (ENTRESTO) 24-26 MG per tablet Take 0.5 tablets by mouth every evening 2/25/19   TRAY Louise CNP   potassium chloride (KLOR-CON M) 20 MEQ extended release tablet Take 2 tablets by mouth 3 times daily 2/6/19   TRAY Louise CNP   clopidogrel (PLAVIX) 75 MG tablet Take 1 tablet by mouth daily 1/31/19   TRAY Louise CNP   nystatin (MYCOSTATIN) 073531 UNIT/ML suspension Take 5 mLs by mouth 4 times daily 1/9/19   TRAY Louise CNP   torsemide (DEMADEX) 100 MG tablet Take 0.5 tablets by mouth daily 1/9/19   TARY Louise CNP   tiotropium (SPIRIVA RESPIMAT) 2.5 MCG/ACT AERS inhaler Inhale 2 puffs into the lungs daily 12/4/18   Melissa Mcarthur MD   carvedilol (COREG) 3.125 MG tablet Take 1 tablet by mouth 2 times daily (with meals) 11/28/18   TRAY Louise CNP   magnesium oxide (MAG-OX) 400 MG tablet Take 1 tablet by mouth daily 11/15/18   TRAY Louise CNP   albuterol (PROVENTIL) (2.5 MG/3ML) 0.083% nebulizer solution Inhale 3 mLs into the lungs 7/5/18   Historical Provider, MD   atorvastatin (LIPITOR) 80 MG tablet Take 80 mg by mouth nightly 5/2/18   Historical Provider, MD   busPIRone (BUSPAR) 30 MG tablet Take 30 mg by mouth 3 times daily 4/2/18   Historical Provider, MD   metFORMIN (GLUCOPHAGE-XR) 500 MG extended release tablet Take 1,000 mg by mouth 2 times daily 4/2/18   Historical Provider, MD   buprenorphine-naloxone (SUBOXONE) 8-2 MG SUBL SL tablet Place 2 tablets under the tongue daily. Ирина Hong Historical Provider, MD   doxepin (SINEQUAN) 50 MG capsule Take 50 mg by mouth nightly    Historical Provider, MD   insulin glargine (LANTUS) 100 UNIT/ML injection vial Inject 25 Units into the skin 2 times daily    Historical Provider, MD   nitroGLYCERIN (NITROSTAT) 0.4 MG SL tablet Place 0.4 mg under the tongue every 5 minutes as needed for Chest pain (times 3) up to max of 3 total doses. If no relief after 1 dose, call 911. Historical Provider, MD   aspirin EC 81 MG EC tablet Take 1 tablet by mouth daily 1/8/16   Junior Octavio MD   pantoprazole (PROTONIX) 40 MG tablet Take 1 tablet by mouth daily  Patient taking differently: Take 40 mg by mouth 2 times daily  12/11/15   Michelle Frankel MD   ARIPiprazole (ABILIFY) 10 MG tablet Take 5 mg by mouth daily     Historical Provider, MD   lamoTRIgine (LAMICTAL) 150 MG tablet Take 150 mg by mouth 2 times daily     Historical Provider, MD        ALLERGIES  is allergic to lisinopril. /74   Pulse 101   Temp 98 °F (36.7 °C) (Oral)   Resp 14   Ht 5' 4\" (1.626 m)   Wt 220 lb (99.8 kg)   LMP 10/24/2012   SpO2 94%   BMI 37.76 kg/m²     Physical Exam   Constitutional: She is oriented to person, place, and time. She appears well-developed. No distress. HENT:   Head: Normocephalic and atraumatic.    Right Ear: External ear normal.   Left Ear: External ear normal.   Mouth/Throat: Oropharynx is clear and moist. No oropharyngeal exudate, posterior Eosinophils % 0.4 %    Basophils % 0.4 %    Neutrophils # 13.0 (H) 1.7 - 7.7 K/uL    Lymphocytes # 0.8 (L) 1.0 - 5.1 K/uL    Monocytes # 0.7 0.0 - 1.3 K/uL    Eosinophils # 0.1 0.0 - 0.6 K/uL    Basophils # 0.1 0.0 - 0.2 K/uL   Comprehensive Metabolic Panel   Result Value Ref Range    Sodium 123 (L) 136 - 145 mmol/L    Potassium 3.0 (L) 3.5 - 5.1 mmol/L    Chloride 67 (L) 99 - 110 mmol/L    CO2 38 (H) 21 - 32 mmol/L    Anion Gap 18 (H) 3 - 16    Glucose 610 (HH) 70 - 99 mg/dL    BUN 54 (H) 7 - 20 mg/dL    CREATININE 1.7 (H) 0.6 - 1.1 mg/dL    GFR Non- 31 (A) >60    GFR  38 (A) >60    Calcium 10.2 8.3 - 10.6 mg/dL    Total Protein 8.6 (H) 6.4 - 8.2 g/dL    Alb 4.1 3.4 - 5.0 g/dL    Albumin/Globulin Ratio 0.9 (L) 1.1 - 2.2    Total Bilirubin 0.8 0.0 - 1.0 mg/dL    Alkaline Phosphatase 152 (H) 40 - 129 U/L    ALT 9 (L) 10 - 40 U/L    AST 10 (L) 15 - 37 U/L    Globulin 4.5 g/dL   Troponin   Result Value Ref Range    Troponin <0.01 <0.01 ng/mL   Lipase   Result Value Ref Range    Lipase 31.0 13.0 - 60.0 U/L   Digoxin Level   Result Value Ref Range    Digoxin Lvl <0.3 (L) 0.8 - 2.0 ng/mL   Lactic acid, plasma   Result Value Ref Range    Lactic Acid 3.0 (H) 0.4 - 2.0 mmol/L   POCT glucose - every hour   Result Value Ref Range    Glucose 482 mg/dL    QC OK?  yes    POCT Glucose   Result Value Ref Range    POC Glucose 482 (H) 70 - 99 mg/dl    Performed on ACCU-CHEK    POCT Glucose   Result Value Ref Range    POC Glucose 422 (H) 70 - 99 mg/dl    Performed on ACCU-CHEK    EKG 12 Lead   Result Value Ref Range    Ventricular Rate 104 BPM    Atrial Rate 104 BPM    P-R Interval 164 ms    QRS Duration 148 ms    Q-T Interval 392 ms    QTc Calculation (Bazett) 515 ms    P Axis 73 degrees    R Axis -70 degrees    T Axis 70 degrees    Diagnosis       Sinus tachycardiaPossible Left atrial enlargementRight bundle branch blockLeft anterior fascicular block Bifascicular block Septal infarct , age undeterminedPossible Lateral infarct , age undeterminedAbnormal ECGNo previous ECGs available       Radiology  The following radiographic studies were viewed by myself. Radiologist's interpretation:    Xr Chest Portable    Result Date: 4/8/2019  EXAMINATION: SINGLE XRAY VIEW OF THE CHEST 4/8/2019 11:45 am COMPARISON: 10/04/2018 HISTORY: ORDERING SYSTEM PROVIDED HISTORY: cp TECHNOLOGIST PROVIDED HISTORY: Reason for exam:->cp Ordering Physician Provided Reason for Exam: cough, congestion, chest pain Acuity: Acute Type of Exam: Initial Relevant Medical/Surgical History: smoker 53-year-old female with acute chest pain, cough, and congestion FINDINGS: Cardiac monitor leads overlie the chest. Cardiac and mediastinal contours unchanged and within normal limits. Surgical clips project over the left axillary region. No acute focal airspace consolidation or pleural effusions. No pneumothorax. No free air. No mediastinal shift. Visualized osseous structures remain unchanged. No acute focal airspace consolidation. EKG Interpretation. EKG interpreted by Min Prajapati MD    Rhythm: sinus tachycardia  Rate: 104 bpm  Axis: normal  Ectopy: none  Conduction: right bundle branch block (complete), left anterior fasciclar block and bifasicular  ST Segments: no acute change  T Waves: no acute change  Q Waves: none  Similar to previous EKG's     Emergency Department Course:  I discussed with the patient a workup. We discussed breathing treatments as well. She is able to smoke still smoking yesterday but less than she did normally. And then she last smoked this morning. Again she came here at the recommendation of her podiatrist.  She states she has pain with coughing. She has a productive cough of colored sputum. Patient is agreeable to plan. 12:15 PM   Discussed results with patient. Patient states she took none of her medications this morning she states she's been too sick to take medicines.   She has

## 2019-04-08 NOTE — ED NOTES
Report called to [facility], RN in sbar. No questions at this time.      Ryan Mcnamara RN  04/08/19 [id number]

## 2019-04-08 NOTE — ED NOTES
Called Clinical at [place] to find out on a bed at ICU, no answer     [id number] [facility]  04/08/19 3099

## 2019-04-08 NOTE — PROGRESS NOTES
Dr. Carlos Zuniga called this RN and stated that pt no longer needs to be on DKA protocol, MD to resumed home meds. Fluids to be d/c, insulin gtt to be d/c, pt OK to eat K to continued to be replaced. MD to place orders.

## 2019-04-08 NOTE — FLOWSHEET NOTE
04/08/19 1635   Vital Signs   Temp 98.4 °F (36.9 °C)   Temp Source Oral   Pulse 90   Resp 16   /65   MAP (mmHg) 76   Level of Consciousness 0   MEWS Score 1   Height and Weight   Height 5' 4.5\" (1.638 m)   Weight 220 lb 14.4 oz (100.2 kg)   Weight Method Actual;Bed scale   BSA (Calculated - sq m) 2.14 sq meters   BMI (Calculated) 37.4   Oxygen Therapy   O2 Device None (Room air)    Pt resting in bed, vitals per doc flow. Assessment complete see doc flow. NSR 90 on ICU bedside monitor. SPO2 94% on RA with CSPO2 monitoring. Pt A&O x 4. PERRL. PIV x 2 WDL. Insulin gtt arrived to RN @ 5 units/hr. BS upon admittion 392 awaiting gtt from pharmacy, NS @ 250 ml/hr. Pt repositioned for comfort. Will continue to closely monitor.

## 2019-04-08 NOTE — PROGRESS NOTES
4 Eyes Skin Assessment     The patient is being assess for   Admission    I agree that 2 RN's have performed a thorough Head to Toe Skin Assessment on the patient. ALL assessment sites listed below have been assessed. Areas assessed by both nurses:   [x]   Head, Face, and Ears   [x]   Shoulders, Back, and Chest, Abdomen  [x]   Arms, Elbows, and Hands   [x]   Coccyx, Sacrum, and Ischium  [x]   Legs, Feet, and Heels        Skin WDL    **SHARE this note so that the co-signing nurse is able to place an eSignature**    Co-signer eSignature: Electronically signed by Beatrice Nayak RN on 4/8/19 at 6:11 PM    Does the Patient have Skin Breakdown?   No          Vivek Prevention initiated:  Yes   Wound Care Orders initiated:  No      [facility] nurse consulted for Pressure Injury (Stage 3,4, Unstageable, DTI, NWPT, Complex wounds)and New or Established Ostomies:  No      Primary Nurse eSignature: Electronically signed by Kwesi Calero RN on 4/8/19 at 6:11 PM

## 2019-04-09 VITALS
RESPIRATION RATE: 20 BRPM | WEIGHT: 220.9 LBS | TEMPERATURE: 98.8 F | OXYGEN SATURATION: 98 % | HEIGHT: 65 IN | BODY MASS INDEX: 36.8 KG/M2 | DIASTOLIC BLOOD PRESSURE: 62 MMHG | SYSTOLIC BLOOD PRESSURE: 100 MMHG | HEART RATE: 93 BPM

## 2019-04-09 PROBLEM — N17.0 ACUTE RENAL FAILURE WITH TUBULAR NECROSIS (HCC): Status: ACTIVE | Noted: 2019-04-09

## 2019-04-09 LAB
ANION GAP SERPL CALCULATED.3IONS-SCNC: 12 MMOL/L (ref 3–16)
ANION GAP SERPL CALCULATED.3IONS-SCNC: 13 MMOL/L (ref 3–16)
ANION GAP SERPL CALCULATED.3IONS-SCNC: 14 MMOL/L (ref 3–16)
BASOPHILS ABSOLUTE: 0 K/UL (ref 0–0.2)
BASOPHILS RELATIVE PERCENT: 0.4 %
BUN BLDV-MCNC: 36 MG/DL (ref 7–20)
BUN BLDV-MCNC: 39 MG/DL (ref 7–20)
BUN BLDV-MCNC: 40 MG/DL (ref 7–20)
CALCIUM SERPL-MCNC: 8.8 MG/DL (ref 8.3–10.6)
CALCIUM SERPL-MCNC: 8.9 MG/DL (ref 8.3–10.6)
CALCIUM SERPL-MCNC: 9.2 MG/DL (ref 8.3–10.6)
CHLORIDE BLD-SCNC: 83 MMOL/L (ref 99–110)
CHLORIDE BLD-SCNC: 84 MMOL/L (ref 99–110)
CHLORIDE BLD-SCNC: 90 MMOL/L (ref 99–110)
CO2: 30 MMOL/L (ref 21–32)
CO2: 31 MMOL/L (ref 21–32)
CO2: 32 MMOL/L (ref 21–32)
CREAT SERPL-MCNC: 1.2 MG/DL (ref 0.6–1.1)
CREAT SERPL-MCNC: 1.3 MG/DL (ref 0.6–1.1)
CREAT SERPL-MCNC: 1.4 MG/DL (ref 0.6–1.1)
EOSINOPHILS ABSOLUTE: 0.2 K/UL (ref 0–0.6)
EOSINOPHILS RELATIVE PERCENT: 2.1 %
GFR AFRICAN AMERICAN: 47
GFR AFRICAN AMERICAN: 51
GFR AFRICAN AMERICAN: 56
GFR NON-AFRICAN AMERICAN: 39
GFR NON-AFRICAN AMERICAN: 42
GFR NON-AFRICAN AMERICAN: 47
GLUCOSE BLD-MCNC: 129 MG/DL (ref 70–99)
GLUCOSE BLD-MCNC: 162 MG/DL (ref 70–99)
GLUCOSE BLD-MCNC: 270 MG/DL (ref 70–99)
GLUCOSE BLD-MCNC: 278 MG/DL (ref 70–99)
GLUCOSE BLD-MCNC: 415 MG/DL (ref 70–99)
GLUCOSE BLD-MCNC: 453 MG/DL (ref 70–99)
GLUCOSE BLD-MCNC: 463 MG/DL (ref 70–99)
HCT VFR BLD CALC: 38 % (ref 36–48)
HEMOGLOBIN: 13 G/DL (ref 12–16)
LACTIC ACID: 2.4 MMOL/L (ref 0.4–2)
LACTIC ACID: 2.4 MMOL/L (ref 0.4–2)
LYMPHOCYTES ABSOLUTE: 1.9 K/UL (ref 1–5.1)
LYMPHOCYTES RELATIVE PERCENT: 22.1 %
MAGNESIUM: 2.1 MG/DL (ref 1.8–2.4)
MAGNESIUM: 2.1 MG/DL (ref 1.8–2.4)
MAGNESIUM: 2.2 MG/DL (ref 1.8–2.4)
MCH RBC QN AUTO: 30.1 PG (ref 26–34)
MCHC RBC AUTO-ENTMCNC: 34.1 G/DL (ref 31–36)
MCV RBC AUTO: 88.1 FL (ref 80–100)
MONOCYTES ABSOLUTE: 0.6 K/UL (ref 0–1.3)
MONOCYTES RELATIVE PERCENT: 7.1 %
NEUTROPHILS ABSOLUTE: 5.8 K/UL (ref 1.7–7.7)
NEUTROPHILS RELATIVE PERCENT: 68.3 %
OSMOLALITY: 290 MOSM/KG (ref 278–305)
PDW BLD-RTO: 14 % (ref 12.4–15.4)
PERFORMED ON: ABNORMAL
PHOSPHORUS: 1.4 MG/DL (ref 2.5–4.9)
PHOSPHORUS: 1.8 MG/DL (ref 2.5–4.9)
PHOSPHORUS: 3.2 MG/DL (ref 2.5–4.9)
PLATELET # BLD: 293 K/UL (ref 135–450)
PMV BLD AUTO: 9.2 FL (ref 5–10.5)
POTASSIUM SERPL-SCNC: 3.2 MMOL/L (ref 3.5–5.1)
POTASSIUM SERPL-SCNC: 3.3 MMOL/L (ref 3.5–5.1)
POTASSIUM SERPL-SCNC: 3.7 MMOL/L (ref 3.5–5.1)
RBC # BLD: 4.32 M/UL (ref 4–5.2)
SODIUM BLD-SCNC: 128 MMOL/L (ref 136–145)
SODIUM BLD-SCNC: 128 MMOL/L (ref 136–145)
SODIUM BLD-SCNC: 133 MMOL/L (ref 136–145)
WBC # BLD: 8.5 K/UL (ref 4–11)

## 2019-04-09 PROCEDURE — 6360000002 HC RX W HCPCS: Performed by: PHYSICIAN ASSISTANT

## 2019-04-09 PROCEDURE — 6370000000 HC RX 637 (ALT 250 FOR IP): Performed by: INTERNAL MEDICINE

## 2019-04-09 PROCEDURE — 99223 1ST HOSP IP/OBS HIGH 75: CPT | Performed by: INTERNAL MEDICINE

## 2019-04-09 PROCEDURE — 94640 AIRWAY INHALATION TREATMENT: CPT

## 2019-04-09 PROCEDURE — 83605 ASSAY OF LACTIC ACID: CPT

## 2019-04-09 PROCEDURE — 36415 COLL VENOUS BLD VENIPUNCTURE: CPT

## 2019-04-09 PROCEDURE — 2580000003 HC RX 258: Performed by: HOSPITALIST

## 2019-04-09 PROCEDURE — 83735 ASSAY OF MAGNESIUM: CPT

## 2019-04-09 PROCEDURE — 99238 HOSP IP/OBS DSCHRG MGMT 30/<: CPT | Performed by: INTERNAL MEDICINE

## 2019-04-09 PROCEDURE — 6360000002 HC RX W HCPCS: Performed by: INTERNAL MEDICINE

## 2019-04-09 PROCEDURE — 6370000000 HC RX 637 (ALT 250 FOR IP): Performed by: HOSPITALIST

## 2019-04-09 PROCEDURE — 85025 COMPLETE CBC W/AUTO DIFF WBC: CPT

## 2019-04-09 PROCEDURE — 80048 BASIC METABOLIC PNL TOTAL CA: CPT

## 2019-04-09 PROCEDURE — 84100 ASSAY OF PHOSPHORUS: CPT

## 2019-04-09 RX ORDER — INSULIN GLARGINE 100 [IU]/ML
35 INJECTION, SOLUTION SUBCUTANEOUS 2 TIMES DAILY
Qty: 3 VIAL | Refills: 3 | Status: SHIPPED | OUTPATIENT
Start: 2019-04-09 | End: 2019-10-29 | Stop reason: SDUPTHER

## 2019-04-09 RX ORDER — ALBUTEROL SULFATE 90 UG/1
2 AEROSOL, METERED RESPIRATORY (INHALATION) EVERY 6 HOURS PRN
Status: DISCONTINUED | OUTPATIENT
Start: 2019-04-09 | End: 2019-04-09 | Stop reason: HOSPADM

## 2019-04-09 RX ORDER — SODIUM CHLORIDE 9 MG/ML
INJECTION, SOLUTION INTRAVENOUS
Status: DISCONTINUED
Start: 2019-04-09 | End: 2019-04-09 | Stop reason: HOSPADM

## 2019-04-09 RX ORDER — SODIUM CHLORIDE 9 MG/ML
INJECTION, SOLUTION INTRAVENOUS ONCE
Status: COMPLETED | OUTPATIENT
Start: 2019-04-09 | End: 2019-04-09

## 2019-04-09 RX ORDER — ONDANSETRON 2 MG/ML
INJECTION INTRAMUSCULAR; INTRAVENOUS
Status: DISCONTINUED
Start: 2019-04-09 | End: 2019-04-09 | Stop reason: HOSPADM

## 2019-04-09 RX ORDER — NICOTINE POLACRILEX 4 MG
15 LOZENGE BUCCAL PRN
Status: DISCONTINUED | OUTPATIENT
Start: 2019-04-09 | End: 2019-04-09 | Stop reason: HOSPADM

## 2019-04-09 RX ORDER — ONDANSETRON 2 MG/ML
4 INJECTION INTRAMUSCULAR; INTRAVENOUS EVERY 6 HOURS PRN
Status: DISCONTINUED | OUTPATIENT
Start: 2019-04-09 | End: 2019-04-09 | Stop reason: HOSPADM

## 2019-04-09 RX ORDER — ALBUTEROL SULFATE 2.5 MG/3ML
2.5 SOLUTION RESPIRATORY (INHALATION) 4 TIMES DAILY
Status: DISCONTINUED | OUTPATIENT
Start: 2019-04-09 | End: 2019-04-09 | Stop reason: HOSPADM

## 2019-04-09 RX ORDER — INSULIN GLARGINE 100 [IU]/ML
35 INJECTION, SOLUTION SUBCUTANEOUS 2 TIMES DAILY
Status: DISCONTINUED | OUTPATIENT
Start: 2019-04-09 | End: 2019-04-09 | Stop reason: HOSPADM

## 2019-04-09 RX ADMIN — SODIUM CHLORIDE: 9 INJECTION, SOLUTION INTRAVENOUS at 01:04

## 2019-04-09 RX ADMIN — ONDANSETRON 4 MG: 2 INJECTION INTRAMUSCULAR; INTRAVENOUS at 08:52

## 2019-04-09 RX ADMIN — POTASSIUM CHLORIDE 10 MEQ: 7.46 INJECTION, SOLUTION INTRAVENOUS at 08:35

## 2019-04-09 RX ADMIN — METOLAZONE 5 MG: 2.5 TABLET ORAL at 11:35

## 2019-04-09 RX ADMIN — DOXYCYCLINE HYCLATE 100 MG: 100 TABLET, COATED ORAL at 08:24

## 2019-04-09 RX ADMIN — DIGOXIN 125 MCG: 125 TABLET ORAL at 13:56

## 2019-04-09 RX ADMIN — METFORMIN HYDROCHLORIDE 1000 MG: 500 TABLET, EXTENDED RELEASE ORAL at 08:23

## 2019-04-09 RX ADMIN — PANTOPRAZOLE SODIUM 40 MG: 40 TABLET, DELAYED RELEASE ORAL at 08:23

## 2019-04-09 RX ADMIN — TIOTROPIUM BROMIDE 18 MCG: 18 CAPSULE ORAL; RESPIRATORY (INHALATION) at 06:31

## 2019-04-09 RX ADMIN — POTASSIUM CHLORIDE 40 MEQ: 10 TABLET, EXTENDED RELEASE ORAL at 08:23

## 2019-04-09 RX ADMIN — ALBUTEROL SULFATE 2.5 MG: 2.5 SOLUTION RESPIRATORY (INHALATION) at 10:44

## 2019-04-09 RX ADMIN — SPIRONOLACTONE 100 MG: 100 TABLET, FILM COATED ORAL at 11:35

## 2019-04-09 RX ADMIN — INSULIN LISPRO 20 UNITS: 100 INJECTION, SOLUTION INTRAVENOUS; SUBCUTANEOUS at 11:41

## 2019-04-09 RX ADMIN — NYSTATIN 500000 UNITS: 100000 SUSPENSION ORAL at 11:36

## 2019-04-09 RX ADMIN — TORSEMIDE 50 MG: 100 TABLET ORAL at 11:36

## 2019-04-09 RX ADMIN — ASPIRIN 81 MG: 81 TABLET, COATED ORAL at 08:23

## 2019-04-09 RX ADMIN — INSULIN LISPRO 20 UNITS: 100 INJECTION, SOLUTION INTRAVENOUS; SUBCUTANEOUS at 08:27

## 2019-04-09 RX ADMIN — POTASSIUM CHLORIDE 10 MEQ: 7.46 INJECTION, SOLUTION INTRAVENOUS at 05:45

## 2019-04-09 RX ADMIN — ALBUTEROL SULFATE 2.5 MG: 2.5 SOLUTION RESPIRATORY (INHALATION) at 06:30

## 2019-04-09 RX ADMIN — INSULIN LISPRO 18 UNITS: 100 INJECTION, SOLUTION INTRAVENOUS; SUBCUTANEOUS at 06:16

## 2019-04-09 RX ADMIN — INSULIN LISPRO 9 UNITS: 100 INJECTION, SOLUTION INTRAVENOUS; SUBCUTANEOUS at 08:25

## 2019-04-09 RX ADMIN — POTASSIUM CHLORIDE 10 MEQ: 7.46 INJECTION, SOLUTION INTRAVENOUS at 06:45

## 2019-04-09 RX ADMIN — LAMOTRIGINE 150 MG: 100 TABLET ORAL at 10:37

## 2019-04-09 RX ADMIN — ARIPIPRAZOLE 5 MG: 10 TABLET ORAL at 08:23

## 2019-04-09 RX ADMIN — INSULIN GLARGINE 35 UNITS: 100 INJECTION, SOLUTION SUBCUTANEOUS at 08:24

## 2019-04-09 RX ADMIN — INSULIN LISPRO 3 UNITS: 100 INJECTION, SOLUTION INTRAVENOUS; SUBCUTANEOUS at 11:41

## 2019-04-09 RX ADMIN — POTASSIUM CHLORIDE 10 MEQ: 7.46 INJECTION, SOLUTION INTRAVENOUS at 11:48

## 2019-04-09 RX ADMIN — Medication 400 MG: at 11:36

## 2019-04-09 RX ADMIN — SODIUM CHLORIDE: 9 INJECTION, SOLUTION INTRAVENOUS at 05:48

## 2019-04-09 RX ADMIN — NYSTATIN 500000 UNITS: 100000 SUSPENSION ORAL at 08:23

## 2019-04-09 RX ADMIN — POTASSIUM CHLORIDE 10 MEQ: 7.46 INJECTION, SOLUTION INTRAVENOUS at 10:35

## 2019-04-09 RX ADMIN — ENOXAPARIN SODIUM 40 MG: 40 INJECTION SUBCUTANEOUS at 08:24

## 2019-04-09 ASSESSMENT — PAIN SCALES - GENERAL: PAINLEVEL_OUTOF10: 0

## 2019-04-09 NOTE — PROGRESS NOTES
RESPIRATORY THERAPY ASSESSMENT    Name:  Amberly Rios  Medical Record Number:  [id number]  Age: 52 y.o. Gender: female  : 1965  Today's Date:  2019  Room:  [facility]-01    Assessment     Is the patient being admitted for a COPD or Asthma exacerbation? No   (If yes the patient will be seen every 4 hours for the first 24 hours and then reassessed)    Patient Admission Diagnosis      Allergies  Allergies   Allergen Reactions    Lisinopril Other (See Comments)     Severe hypotension       Minimum Predicted Vital Capacity:     816          Actual Vital Capacity:      910              Pulmonary History:COPD and CHF/Pulmonary Edema  Home Oxygen Therapy:  room air  Home Respiratory Therapy:Albuterol and Tiotropium Bromide   Current Respiratory Therapy:  Albuterol QID, Spiriva Daily  Treatment Type: HHN  Medications: Albuterol    Respiratory Severity Index(RSI)   Patients with orders for inhalation medications, oxygen, or any therapeutic treatment modality will be placed on Respiratory Protocol. They will be assessed with the first treatment and at least every 72 hours thereafter. The following severity scale will be used to determine frequency of treatment intervention.     Smoking History: Pulmonary Disease or Smoking History, Greater than 15 pack year = 2    Social History  Social History     Tobacco Use    Smoking status: Current Every Day Smoker     Packs/day: 0.25     Years: 30.00     Pack years: 7.50     Types: Cigarettes     Last attempt to quit: 2019     Years since quittin.2    Smokeless tobacco: Never Used    Tobacco comment: 4 per day   Substance Use Topics    Alcohol use: No    Drug use: No       Recent Surgical History: None = 0  Past Surgical History  Past Surgical History:   Procedure Laterality Date    ARTERIAL BYPASS SURGRY Left 2016    Axillary/Subclavian to Brachial Bypass w/reversed SVG    CARDIAC CATHETERIZATION  2018    Dr. Monico Guerrero - [facility]    CHOLECYSTECTOMY      pancreatitis post surgery    CORONARY ANGIOPLASTY WITH STENT PLACEMENT  03/16/2018    MELODY- 3.5 x 38 and 3.5 x 20 to Cx    CORONARY ANGIOPLASTY WITH STENT PLACEMENT  01/03/2018    MELODY- 3.0 x 38 and 2.75 x 26 and 2.75 x 8 and 2.75 x 22 to the LAD    INSERTABLE CARDIAC MONITOR  02/14/2019    CardioMEMs insertion for CHF    ROTATOR CUFF REPAIR Left 04/22/2016    TONSILLECTOMY      TUMOR EXCISION      benign tumors X 2 chest & axilla    UPPER GASTROINTESTINAL ENDOSCOPY  4/25/2013    BX barretts, HH, gastritis    UPPER GASTROINTESTINAL ENDOSCOPY  12/10/2015    UPPER GASTROINTESTINAL ENDOSCOPY  01/06/2017    Gastritis       Level of Consciousness: Alert, Oriented, and Cooperative = 0    Level of Activity: Mostly sedentary, minimal walking = 2    Respiratory Pattern: Regular Pattern; RR 8-20 = 0    Breath Sounds: Diminshed bilaterally and/or crackles = 2    Sputum   ,  ,    Cough: Strong, spontaneous, non-productive = 0    Vital Signs   BP (!) 89/56   Pulse 92   Temp 97.9 °F (36.6 °C) (Oral)   Resp 20   Ht 5' 4.5\" (1.638 m)   Wt 220 lb 14.4 oz (100.2 kg)   LMP 10/24/2012   SpO2 98%   BMI 37.33 kg/m²   SPO2 (COPD values may differ): Greater than or equal to 92% on room air = 0    Peak Flow (asthma only): not applicable = 0    RSI: 5-6 = Q4hr PRN (every four hours as needed) for dyspnea        Plan       Goals: medication delivery, mobilize retained secretions, volume expansion and improve oxygenation    Patient/caregiver was educated on the proper method of use for Respiratory Care Devices:  Yes      Level of patient/caregiver understanding able to:   ? Verbalize understanding   ? Demonstrate understanding       ? Teach back        ? Needs reinforcement       ? No available caregiver               ? Other:     Response to education:  Excellent     Is patient being placed on Home Treatment Regimen?   Yes     Does the patient have everything they need prior to discharge? NA     Comments: pt assessed & chart reviewed    Plan of Care: maintain home regimen    Electronically signed by Allison Camacho RCP on 4/9/2019 at 12:08 AM    Respiratory Protocol Guidelines     1. Assessment and treatment by Respiratory Therapy will be initiated for medication and therapeutic interventions upon initiation of aerosolized medication. 2. Physician will be contacted for respiratory rate (RR) greater than 35 breaths per minute. Therapy will be held for heart rate (HR) greater than 140 beats per minute, pending direction from physician. 3. Bronchodilators will be administered via Metered Dose Inhaler (MDI) with spacer when the following criteria are met:  a. Alert and cooperative     b. HR < 140 bpm  c. RR < 30 bpm                d. Can demonstrate a 2-3 second inspiratory hold  4. Bronchodilators will be administered via Hand Held Nebulizer [facility]) to patients when ANY of the following criteria are met  a. Incognizant or uncooperative          b. Patients treated with HHN at Home        c. Unable to demonstrate proper use of MDI with spacer     d. RR > 30 bpm   5. Bronchodilators will be delivered via Metered Dose Inhaler (MDI), HHN, Aerogen to intubated patients on mechanical ventilation. 6. Inhalation medication orders will be delivered and/or substituted as outlined below. Aerosolized Medications Ordering and Administration Guidelines:    1. All Medications will be ordered by a physician, and their frequency and/or modality will be adjusted as defined by the patients Respiratory Severity Index (RSI) score. 2. If the patient does not have documented COPD, consider discontinuing anticholinergics when RSI is less than 9.  3. If the bronchospasm worsens (increased RSI), then the bronchodilator frequency can be increased to a maximum of every 4 hours. If greater than every 4 hours is required, the physician will be contacted.   4. If the bronchospasm improves, the frequency of the bronchodilator can be decreased, based on the patient's RSI, but not less than home treatment regimen frequency. 5. Bronchodilator(s) will be discontinued if patient has a RSI less than 9 and has received no scheduled or as needed treatment for 72  Hrs. Patients Ordered on a Mucolytic Agent:    1. Must always be administered with a bronchodilator. 2. Discontinue if patient experiences worsened bronchospasm, or secretions have lessened to the point that the patient is able to clear them with a cough. Anti-inflammatory and Combination Medications:    1. If the patient lacks prior history of lung disease, is not using inhaled anti-inflammatory medication at home, and lacks wheezing by examination or by history for at least 24 hours, contact physician for possible discontinuation.

## 2019-04-09 NOTE — PROGRESS NOTES
18 units Humalog insulin given now per Dr. Eliot Cervantes. Pt glucose of 415. Will monitor closely.

## 2019-04-09 NOTE — PROGRESS NOTES
Patient's EF (Ejection Fraction) is greater than 40%    Patient has a past medical history of Anxiety and depression, CAD (coronary artery disease), Cardiomyopathy ([address] CHF (congestive heart failure) ([address] COPD (chronic obstructive pulmonary disease) ([address] Diabetes mellitus ([address] GERD (gastroesophageal reflux disease), Hyperlipidemia, Hypertension, Peripheral neuropathy, Peripheral vascular disease ([address] Pulmonary HTN ([address] Reflux, Sleep apnea, and Wears glasses. Comorbidities reviewed and education provided as appropriate. Patient and/or family's stated goal of care: better understand heart failure and disease management prior to discharge and reduce lower extremity edema prior to discharge    Pt is currently Room air. Pt without lower extremity edema. Patient's weights and intake/output reviewed:    Patient Vitals for the past 96 hrs (Last 3 readings):   Weight   04/08/19 1635 220 lb 14.4 oz (100.2 kg)   04/08/19 1130 220 lb (99.8 kg)       Intake/Output Summary (Last 24 hours) at 4/9/2019 1343  Last data filed at 4/9/2019 1320  Gross per 24 hour   Intake 3583 ml   Output 3150 ml   Net 433 ml         Patient's current functional capacity:  Slight limitation of physical activity. Comfortable at rest. Ordinary physical activity results in fatigue, palpitation, dyspnea. Patient education on CHF at discharge is 15 minutes. Follow appointment made with [facility] [address] [place] [address] on 4/10/19 at 1400. Pt verbalized that this is a good time and date for the appointment.   >> For CHF and Comorbidity Education Time and Topics, please see Education Tab.

## 2019-04-09 NOTE — CONSULTS
Patient is being seen at the request of Yevgeniy Zavala for a consultation for diabetic ketoacidosis     HISTORY OF PRESENT ILLNESS: The patient is a 77-year-old woman with a past medical history of diabetes mellitus, coronary artery disease, chronic bronchitis, tobacco abuse, obstructive sleep apnea noncompliant with CPAP who presented to [facility] emergency room yesterday with 3 days of productive cough. The patient reported a cough productive of brown thick sputum. She had associated rib and chest discomfort after coughing. She smokes approximately one half to one pack per day and has for approximately 40 years. She stated that she has not been using her insulin as prescribed over the last several days because she's been feeling too ill. In the emergency department she was found to be in diabetic ketoacidosis with multiple electrolyte abnormalities. She was placed on an insulin drip and given IV fluid resuscitation and eventually transferred to the ICU for further care. After arrival in the ICU her repeat labs revealed improvement in her hyperglycemia and resolution of her acidosis. This morning she feels slightly better.       PAST MEDICAL HISTORY:  Past Medical History:   Diagnosis Date    Anxiety and depression     CAD (coronary artery disease) 01/2018    Cardiomyopathy ([address]     CHF (congestive heart failure) (HCC)     COPD (chronic obstructive pulmonary disease) (HCC)     Diabetes mellitus (HCC)     GERD (gastroesophageal reflux disease)     Hyperlipidemia     Hypertension     Peripheral neuropathy     Peripheral vascular disease ([address]     Pulmonary HTN ([address]     Reflux     Sleep apnea     has never done sleep study;does not use CPAP    Wears glasses     for reading     PAST SURGICAL HISTORY:  Past Surgical History:   Procedure Laterality Date    ARTERIAL BYPASS SURGRY Left 01/06/2016    Axillary/Subclavian to Brachial Bypass w/reversed SVG    CARDIAC CATHETERIZATION  03/27/2018 MCV 87.1 88.1    293     BMP:   Recent Labs     04/08/19  1129 04/08/19  1703 04/09/19  0437   * 125* 128*   K 3.0* 2.6* 3.3*   CL 67* 77* 83*   CO2 38* 33* 31   PHOS  --  2.4* 3.2   BUN 54* 45* 40*   CREATININE 1.7* 1.3* 1.4*     LIVER PROFILE:   Recent Labs     04/08/19  1129   AST 10*   ALT 9*   LIPASE 31.0   BILITOT 0.8   ALKPHOS 152*     PT/INR: No results for input(s): PROTIME, INR in the last 72 hours. APTT: No results for input(s): APTT in the last 72 hours. UA:No results for input(s): NITRITE, COLORU, PHUR, LABCAST, WBCUA, RBCUA, MUCUS, TRICHOMONAS, YEAST, BACTERIA, CLARITYU, SPECGRAV, LEUKOCYTESUR, UROBILINOGEN, BILIRUBINUR, BLOODU, GLUCOSEU, AMORPHOUS in the last 72 hours. Invalid input(s): KETONESU  No results for input(s): PHART, FAN6TNM, PO2ART in the last 72 hours. Chest imaging was reviewed by me and showed:  CXR: no airspace opacities, normal cardiomediastinal silhouette     ASSESSMENT:  ·  diabetic ketoacidosis   · Acute renal failure  · Hypovolemia  · Hyponatremia  · Hypokalemia  · Acute on Chronic bronchitis  · CHF  · CAD  · Moderate CLINT on CPAP 8CWP  · Tobacco abuse- 45 pack years  · Pulm nodules followed by Dr. Holly Kebede as outpatient- repeat CT in 2/2020    PLAN:  Aggressive IVF resuscitation with normal saline given per DKA protocol;  SSI  Replaced K per protocol  Serial electrolytes  normal cardiac markers, patient on telemetry  doxycycline  · Advanced diet      Thank you for the consult.

## 2019-04-09 NOTE — PROGRESS NOTES
Vitals:    04/09/19 0632 04/09/19 0633 04/09/19 0700 04/09/19 0800   BP:   (!) 82/55 (!) 89/56   Pulse:   93 86   Resp: 19 13 18 13   Temp:       TempSrc:       SpO2: 95% 98% 94% 98%   Weight:       Height:           BMP  Recent Labs     04/08/19  1703 04/09/19  0437 04/09/19  0833   * 128* 128*   K 2.6* 3.3* 3.2*   CO2 33* 31 32   BUN 45* 40* 39*   CREATININE 1.3* 1.4* 1.3*   MG 2.10 2.10 2.20   PHOS 2.4* 3.2 1.8*   CALCIUM 9.0 8.8 8.9   GLUCOSE 322* 453* 270*        CBC  Recent Labs     04/08/19  1129 04/09/19  0437   WBC 14.7* 8.5   HGB 15.8 13.0   HCT 46.2 38.0    293          Intake/Output Summary (Last 24 hours) at 4/9/2019 0914  Last data filed at 4/9/2019 0911  Gross per 24 hour   Intake 3583 ml   Output 2400 ml   Net 1183 ml       sodium chloride    Pt is seen lying in bed. They are alert and oriented times 4. Speech is Clear. RASS is 0  The pt is currently on room air. Pain is rated as 0/10. Pt's tolerable level of pain is 3/10. IV access is located in the pt's upper extremities see flowchart. See EMAR for current infusions and rates. The pt is using BSC  to void. See flowsheets for assessment.   Amparo Herrera RN

## 2019-04-09 NOTE — CARE COORDINATION
Case Management Assessment  Initial Evaluation    Date/Time of Evaluation: 4/9/2019 11:04 AM  Assessment Completed by: Clau Rivas    Patient Name: Bello Wagner  YOB: 1965  Diagnosis: DKA, type 1, not at goal [facility]) [E10.10]  DKA, type 1, not at goal [facility]) [E10.10]  Date / Time: 4/8/2019 11:14 AM  Admission status/Date: 04/8/19  Chart Reviewed: Yes      Patient Interviewed: Yes   Family Interviewed:  No      Hospitalization in the last 30 days:  No    Contacts  :     Relationship to Patient:   Phone Number:    Alternate Contact:     Relationship to Patient:     Phone Number:    Met with:    Current PCP  Franck    Financial  Commercial  Precert required for SNF : Y, N        3 night stay required - Y, N    ADLS  Support Systems: Children, Friends/Neighbors  Transportation: self    Meal Preparation: self    Housing  Home Environment: trailer  Steps: 2  Plans to Return to Present Housing: Yes  Other Identified Issues: Yogi Williamn  Currently active with [address] : No  Type of Home Care Services: None  Passport/Waiver : No  :                      Phone Number:    Passport/Waiver Services: [address]   [facility] [place]: [facility]  Equipment: Walker__Cane__RTS__ BSC__Shower Chair__  02__ HHN__ CPAP__  BiPap__  Hospital Bed__ W/C___ Other__________      Has Home O2 in place on admit:  No  Informed of need to bring portable home O2 tank on day of discharge for nursing to connect prior to leaving:   Not Indicated  Verbalized agreement/Understanding:   Not Indicated    Community Service Affiliation  Dialysis:  No    · Name:  · Location  · Dialysis Schedule:  · Phone:   · Fax: Outpatient PT/OT: No    Cancer Center: No     CHF Clinic: No     Pulmonary Rehab: No  Pain Clinic: No  Community Mental Health: No    Wound Clinic: No     Other: n/a    DISCHARGE PLAN: Return home with sp. I-pta. Has Glucometer, supplies, Insulin and Ins coverage. Dc'd from ICU today. Denies needs. Explained Case Management role/services.

## 2019-04-09 NOTE — PROGRESS NOTES
This RN contacted Dr. Mary Beth Briones via [employer] in regards to pt's BP 85/47 (58). Waiting for response.

## 2019-04-10 ENCOUNTER — PROCEDURE VISIT (OUTPATIENT)
Dept: CARDIOLOGY CLINIC | Age: 54
End: 2019-04-10
Payer: COMMERCIAL

## 2019-04-10 ENCOUNTER — OFFICE VISIT (OUTPATIENT)
Dept: CARDIOLOGY CLINIC | Age: 54
End: 2019-04-10
Payer: COMMERCIAL

## 2019-04-10 VITALS
WEIGHT: 217 LBS | OXYGEN SATURATION: 91 % | SYSTOLIC BLOOD PRESSURE: 118 MMHG | HEIGHT: 64 IN | HEART RATE: 94 BPM | DIASTOLIC BLOOD PRESSURE: 62 MMHG | BODY MASS INDEX: 37.05 KG/M2

## 2019-04-10 DIAGNOSIS — I50.22 CHRONIC SYSTOLIC CONGESTIVE HEART FAILURE (HCC): ICD-10-CM

## 2019-04-10 DIAGNOSIS — N18.30 TYPE 2 DIABETES MELLITUS WITH STAGE 3 CHRONIC KIDNEY DISEASE, WITH LONG-TERM CURRENT USE OF INSULIN (HCC): ICD-10-CM

## 2019-04-10 DIAGNOSIS — N18.30 CKD (CHRONIC KIDNEY DISEASE) STAGE 3, GFR 30-59 ML/MIN (HCC): ICD-10-CM

## 2019-04-10 DIAGNOSIS — G47.33 OSA (OBSTRUCTIVE SLEEP APNEA): ICD-10-CM

## 2019-04-10 DIAGNOSIS — E11.22 TYPE 2 DIABETES MELLITUS WITH STAGE 3 CHRONIC KIDNEY DISEASE, WITH LONG-TERM CURRENT USE OF INSULIN (HCC): ICD-10-CM

## 2019-04-10 DIAGNOSIS — Z72.0 TOBACCO ABUSE: ICD-10-CM

## 2019-04-10 DIAGNOSIS — E78.2 MIXED HYPERLIPIDEMIA: ICD-10-CM

## 2019-04-10 DIAGNOSIS — I25.10 CORONARY ARTERY DISEASE INVOLVING NATIVE CORONARY ARTERY OF NATIVE HEART WITHOUT ANGINA PECTORIS: Primary | ICD-10-CM

## 2019-04-10 DIAGNOSIS — I34.0 MITRAL VALVE INSUFFICIENCY, UNSPECIFIED ETIOLOGY: ICD-10-CM

## 2019-04-10 DIAGNOSIS — Z79.4 TYPE 2 DIABETES MELLITUS WITH STAGE 3 CHRONIC KIDNEY DISEASE, WITH LONG-TERM CURRENT USE OF INSULIN (HCC): ICD-10-CM

## 2019-04-10 DIAGNOSIS — I10 ESSENTIAL HYPERTENSION: ICD-10-CM

## 2019-04-10 DIAGNOSIS — I25.5 ISCHEMIC CARDIOMYOPATHY: ICD-10-CM

## 2019-04-10 PROCEDURE — 1111F DSCHRG MED/CURRENT MED MERGE: CPT | Performed by: NURSE PRACTITIONER

## 2019-04-10 PROCEDURE — 2022F DILAT RTA XM EVC RTNOPTHY: CPT | Performed by: NURSE PRACTITIONER

## 2019-04-10 PROCEDURE — 93264 REM MNTR WRLS P-ART PRS SNR: CPT | Performed by: NURSE PRACTITIONER

## 2019-04-10 PROCEDURE — G8427 DOCREV CUR MEDS BY ELIG CLIN: HCPCS | Performed by: NURSE PRACTITIONER

## 2019-04-10 PROCEDURE — G8598 ASA/ANTIPLAT THER USED: HCPCS | Performed by: NURSE PRACTITIONER

## 2019-04-10 PROCEDURE — 3046F HEMOGLOBIN A1C LEVEL >9.0%: CPT | Performed by: NURSE PRACTITIONER

## 2019-04-10 PROCEDURE — 4004F PT TOBACCO SCREEN RCVD TLK: CPT | Performed by: NURSE PRACTITIONER

## 2019-04-10 PROCEDURE — 99214 OFFICE O/P EST MOD 30 MIN: CPT | Performed by: NURSE PRACTITIONER

## 2019-04-10 PROCEDURE — 3017F COLORECTAL CA SCREEN DOC REV: CPT | Performed by: NURSE PRACTITIONER

## 2019-04-10 PROCEDURE — G8417 CALC BMI ABV UP PARAM F/U: HCPCS | Performed by: NURSE PRACTITIONER

## 2019-04-10 ASSESSMENT — ENCOUNTER SYMPTOMS
SORE THROAT: 0
WHEEZING: 1
COUGH: 1
NAUSEA: 1
SINUS PRESSURE: 0
SHORTNESS OF BREATH: 1

## 2019-04-10 NOTE — PATIENT INSTRUCTIONS
1. No change in heart medicines  2. Repeat blood work in 2-3 weeks  3.  Follow up with me in 6 weeks (June 5, 2019)

## 2019-04-10 NOTE — PROGRESS NOTES
[address]   Cardiac Evaluation    Primary Care Doctor:  Deena Bledsoe    Chief Complaint   Patient presents with   [address] from [facility]     04/08-04/09/2019    Congestive Heart Failure    Coronary Artery Disease    Hyperlipidemia    Hypertension    Medication Adjustment    Discuss Labs     04/09/2019    Results     echo 04/03/2019, ekg & cxr 04/08/2019    Dizziness     last night    Bloated     stomach        History of Present Illness:   I had the pleasure of seeing Brian Collins in follow up for  CAD, MR, sCHF, ICM, HLD, HTN as well as PAD, DM, CLINT and smoker. She underwent placement of CardioMEMS on 7/56/43 without complication. Since last visit we increased the spironolactone to 100 mg bid and added digoxin 125 mcg daily. She was admitted 4/8/19 with DKA, given IV fluid and correction of electrolytes, discharged the following day. She is feeling better. Cough is better. Blood sugars are better overnight. Still not sleeping well. Tolerating the digoxin okay. Appetite is okay. Still with some wheezing. Still having pain in both legs, right worse than left, rescheduled vascular testing to next week. + abdominal bloating that is unchanged. CardioMEMs pressures reviewed - see below. She is smoking 2-4 cigarettes per day, trying to cut back. Brian Collins describes symptoms including dyspnea, orthopnea, fatigue, early saiety, edema but denies chest pain, palpitations, syncope. NYHA:   III  ACC/ AHA Stage:    C    Past Medical History:   has a past medical history of Anxiety and depression, CAD (coronary artery disease), Cardiomyopathy ([address] CHF (congestive heart failure) ([address] COPD (chronic obstructive pulmonary disease) ([address] Diabetes mellitus ([address] GERD (gastroesophageal reflux disease), Hyperlipidemia, Hypertension, Peripheral neuropathy, Peripheral vascular disease ([address] Pulmonary HTN ([address] Reflux, Sleep apnea, and Wears glasses.   Surgical History:   has a past surgical history that includes Tonsillectomy; Cholecystectomy; tumor excision; Upper gastrointestinal endoscopy (4/25/2013); Upper gastrointestinal endoscopy (12/10/2015); Upper gastrointestinal endoscopy (01/06/2017); Arterial bypass surgry (Left, 01/06/2016); Cardiac catheterization (03/27/2018); Coronary angioplasty with stent (03/16/2018); Rotator cuff repair (Left, 04/22/2016); Coronary angioplasty with stent (01/03/2018); and Insertable Cardiac Monitor (02/14/2019). Social History:   reports that she has been smoking cigarettes. She has a 7.50 pack-year smoking history. She has never used smokeless tobacco. She reports that she does not drink alcohol or use drugs. Family History:   Family History   Problem Relation Age of Onset    Heart Disease Maternal Grandmother     Cancer Mother         lung and brain cancer    Cancer Maternal Aunt         lung and brain cancer       Home Medications:  Prior to Admission medications    Medication Sig Start Date End Date Taking?  Authorizing Provider   insulin glargine (LANTUS) 100 UNIT/ML injection vial Inject 35 Units into the skin 2 times daily 4/9/19  Yes Tierra Leal MD   insulin lispro (HUMALOG KWIKPEN) 100 UNIT/ML pen Inject 15 Units into the skin 3 times daily (before meals) 4/9/19  Yes Tierra Leal MD   Insulin Pen Needle (B-D ULTRAFINE III SHORT PEN) 31G X 8 MM MISC Five shots a day 4/9/19  Yes Tierra Leal MD   metolazone (ZAROXOLYN) 5 MG tablet TAKE 1 TABLET BY MOUTH EVERY DAY 4/3/19  Yes TRAY De Santiago CNP   digoxin (LANOXIN) 125 MCG tablet Take 1 tablet by mouth daily 4/3/19  Yes TRAY Bundy CNP   spironolactone (ALDACTONE) 100 MG tablet Take 1 tablet by mouth 2 times daily 3/13/19  Yes TRAY Salgado CNP   albuterol sulfate HFA (VENTOLIN HFA) 108 (90 Base) MCG/ACT inhaler Inhale 2 puffs into the lungs every 6 hours as needed for Wheezing or Shortness of Breath 3/6/19  Yes Lashanda Evans MD   sacubitril-valsartan (ENTRESTO) 24-26 MG per tablet Take 0.5 tablets by mouth every evening 2/25/19  Yes TRAY Valentin CNP   potassium chloride (KLOR-CON M) 20 MEQ extended release tablet Take 2 tablets by mouth 3 times daily 2/6/19  Yes TRAY Valentin CNP   clopidogrel (PLAVIX) 75 MG tablet Take 1 tablet by mouth daily 1/31/19  Yes TRAY Valentin CNP   nystatin (MYCOSTATIN) 093959 UNIT/ML suspension Take 5 mLs by mouth 4 times daily 1/9/19  Yes TRAY Valentin CNP   torsemide (DEMADEX) 100 MG tablet Take 0.5 tablets by mouth daily 1/9/19  Yes TRAY Valentin CNP   tiotropium (SPIRIVA RESPIMAT) 2.5 MCG/ACT AERS inhaler Inhale 2 puffs into the lungs daily 12/4/18  Yes Lashanda Evans MD   carvedilol (COREG) 3.125 MG tablet Take 1 tablet by mouth 2 times daily (with meals) 11/28/18  Yes TRAY Valentin CNP   magnesium oxide (MAG-OX) 400 MG tablet Take 1 tablet by mouth daily 11/15/18  Yes TRAY Valentin CNP   albuterol (PROVENTIL) (2.5 MG/3ML) 0.083% nebulizer solution Inhale 3 mLs into the lungs 7/5/18  Yes Historical Provider, MD   atorvastatin (LIPITOR) 80 MG tablet Take 80 mg by mouth nightly 5/2/18  Yes Historical Provider, MD   busPIRone (BUSPAR) 30 MG tablet Take 30 mg by mouth 3 times daily 4/2/18  Yes Historical Provider, MD   metFORMIN (GLUCOPHAGE-XR) 500 MG extended release tablet Take 1,000 mg by mouth 2 times daily 4/2/18  Yes Historical Provider, MD   buprenorphine-naloxone (SUBOXONE) 8-2 MG SUBL SL tablet Place 2 tablets under the tongue daily. .   Yes Historical Provider, MD   doxepin (SINEQUAN) 50 MG capsule Take 50 mg by mouth nightly   Yes Historical Provider, MD   nitroGLYCERIN (NITROSTAT) 0.4 MG SL tablet Place 0.4 mg under the tongue every 5 minutes as needed for Chest pain (times 3) up to max of 3 total doses. If no relief after 1 dose, call 911.    Yes Historical Provider, MD   aspirin EC 81 MG EC tablet Take 1 tablet by mouth daily 1/8/16  Yes Nancy Butcher MD   pantoprazole (PROTONIX) 40 MG tablet Take 1 tablet by mouth daily  Patient taking differently: Take 40 mg by mouth 2 times daily  12/11/15  Yes Nguyen Oliva MD   ARIPiprazole (ABILIFY) 10 MG tablet Take 5 mg by mouth daily    Yes Historical Provider, MD   lamoTRIgine (LAMICTAL) 150 MG tablet Take 150 mg by mouth 2 times daily    Yes Historical Provider, MD        Allergies:  Lisinopril     Review of Systems:   Review of Systems   Constitutional: Positive for fatigue. Negative for activity change and appetite change. HENT: Positive for congestion. Negative for sinus pressure, sneezing and sore throat. Eyes: Positive for visual disturbance. Respiratory: Positive for cough, shortness of breath and wheezing. Cardiovascular: Positive for leg swelling. Negative for chest pain and palpitations. Gastrointestinal: Positive for nausea. Neurological: Positive for dizziness and weakness. Negative for syncope and light-headedness. Psychiatric/Behavioral: Positive for sleep disturbance. Physical Examination:    Vitals:    04/10/19 0904   BP: 118/62   Pulse: 94   SpO2: 91%   Weight: 217 lb (98.4 kg)   Height: 5' 4\" (1.626 m)        Constitutional and General Appearance: Warm and dry, no apparent distress, normal coloration  HEENT:  Normocephalic, atraumatic  Respiratory:  · Normal excursion and expansion without use of accessory muscles  · Resp Auscultation: inspiratory wheeze t/o posteriorly  Cardiovascular:  · The apical impulses not displaced  · Heart tones are crisp and normal  · JVP 8-9 cm H2O  · Regular rate and rhythm, normal S1S2, no m/g/r  · Peripheral pulses are symmetrical and full  · There is no clubbing, cyanosis of the extremities.   · 1+ BLE edema  · Pedal Pulses: 2+ and equal   Abdomen:  · No masses or tenderness, + distention, neg HJR  · Liver/Spleen: No Abnormalities Noted  Neurological/Psychiatric:  · Alert and oriented in all spheres  · Moves all extremities well  · Exhibits normal gait balance and coordination  · No abnormalities of mood, affect, memory, mentation, or behavior are noted    Lab Data:  Most recent lab results below reviewed in office    CBC:   Lab Results   Component Value Date    WBC 8.5 04/09/2019    WBC 14.7 04/08/2019    WBC 10.5 02/14/2019    RBC 4.32 04/09/2019    RBC 5.31 04/08/2019    RBC 4.40 02/14/2019    HGB 13.0 04/09/2019    HGB 15.8 04/08/2019    HGB 12.9 02/14/2019    HCT 38.0 04/09/2019    HCT 46.2 04/08/2019    HCT 38.0 02/14/2019    MCV 88.1 04/09/2019    MCV 87.1 04/08/2019    MCV 86.5 02/14/2019    RDW 14.0 04/09/2019    RDW 13.9 04/08/2019    RDW 14.8 02/14/2019     04/09/2019     04/08/2019     02/14/2019     BMP:  Lab Results   Component Value Date     04/09/2019     04/09/2019     04/09/2019    K 3.7 04/09/2019    K 3.2 04/09/2019    K 3.3 04/09/2019    K 4.2 05/27/2018    K 4.2 05/24/2018    CL 90 04/09/2019    CL 84 04/09/2019    CL 83 04/09/2019    CO2 30 04/09/2019    CO2 32 04/09/2019    CO2 31 04/09/2019    PHOS 1.4 04/09/2019    PHOS 1.8 04/09/2019    PHOS 3.2 04/09/2019    BUN 36 04/09/2019    BUN 39 04/09/2019    BUN 40 04/09/2019    CREATININE 1.2 04/09/2019    CREATININE 1.3 04/09/2019    CREATININE 1.4 04/09/2019     BNP:   Lab Results   Component Value Date    PROBNP 282 03/13/2019    PROBNP 835 03/04/2019    PROBNP 902 02/22/2019     LIPID:   Lab Results   Component Value Date    TRIG 255 11/28/2018    TRIG 305 08/01/2017    HDL 25 11/28/2018    HDL 41 08/01/2017    LDLCALC 76 11/28/2018    LDLCALC see below 08/01/2017    LDLDIRECT 187 08/01/2017       Cardiac Imaging:  Arterial doppler studies 8/2/18:   Dennie Arms is no arterial insufficiency at rest involving the lower extremities    bilaterally, however, there is evidence to suggest calf vessel disease    bilaterally.      [facility] 3/26/18 ASCENDANT MDX promus premier [id number] CCx and [id number] CCx. Echo: 5/23/18:    Ejection fraction is visually estimated to be 30-35 %. There is akinesis of the apex and inferior walls. Left ventricular size is mildly increased. Moderate mitral regurgitation. Moderate amount plaque is noted in the aorta. The left atrium is mildly dilated. 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 A bubble study was performed and fails to show evidence of shunting. Procedure performed: Transesophageal echocardiogram 5/25/18:    Findings:  Reduced left ventricular function with ejection fraction estimated at about 35% with apical and inferior akinesis    The cardiac valves show moderate mitral regurgitation  There is no evidence for an intracardiac shunt or intracardiac thrombus. Cardiac cath 5/25/18:   Procedure Performed:  1. Coronary angiogram  2. Left heart cath  3. Left ventriculogram  4. Right heart cath   Findings:  1. Patent LAD and CIRC stents              ~mild CAD  2. Reduced LVEF 30% with anterior and apical akinesis  3. Moderate pulmonary hyperension   Conclusion/plan of care:  1. Medical management    Assessment:    1. Coronary artery disease involving native coronary artery of native heart without angina pectoris    2. Ischemic cardiomyopathy    3. Chronic systolic congestive heart failure ([address]    4. Essential hypertension    5. Mixed hyperlipidemia    6. Mitral valve insufficiency, unspecified etiology    7. Tobacco smoker    8. CLINT (obstructive sleep apnea)    9. CKD (chronic kidney disease) stage 3, GFR 30-59 ml/min ([facility])    10. Type 2 diabetes mellitus with stage 3 chronic kidney disease, with long-term current use of insulin ([facility])      Though EF improved, she remains symptomatic and limited due to CHF, CAD and PAD. I do not see where she can go back to physical work. Plan:   1. No change in heart medicines  2. Repeat blood work in 2-3 weeks  3.  Follow up with me in 6 weeks (June 5, 2019)      I appreciate the opportunity of cooperating in the care of this individual.    TRAY Louise CNP, 4/10/2019, 9:15 AM       QUALITY MEASURES  1. Tobacco Cessation Counseling: Yes  2. Retake of BP if >140/90:   NA  3. Documentation to PCP/referring for new patient:  Sent to PCP at close of office visit  4. CAD patient on anti-platelet: Yes  5. CAD patient on STATIN therapy:  Yes  6.  Patient with CHF and aFib on anticoagulation:  NA

## 2019-04-11 NOTE — DISCHARGE SUMMARY
Name:  Mae Rivas  Room:  3001/3001-01  MRN:    [id number]    Discharge Summary      This discharge summary is in conjunction with a complete physical exam done on the day of discharge. Discharging Physician: Nancy Jauregui       Admit: 4/8/2019  Discharge:  4/9/2019    Diagnoses this Admission    Principal Problem:    DKA, type 1, not at goal [facility])  Active Problems:    Coronary artery disease involving native coronary artery of native heart without angina pectoris    HTN (hypertension)    Tobacco smoker    Volume depletion    GERD (gastroesophageal reflux disease)    Hyperlipidemia    Anxiety and depression    Chronic systolic congestive heart failure (HCC)    CKD (chronic kidney disease) stage 3, GFR 30-59 ml/min (HCC)    Hypokalemia    Acute renal failure with tubular necrosis (HCC)  Resolved Problems:    * No resolved hospital problems. *          Procedures (Please Review Full Report for Details)  none     Consults    Critical care    HPI:    52year old white female who has DM/CAD/CHF who came to the ER with some cough and chest pain. She had nausea and emesis. No chest pain. Work up showed DKA. With insulin drip per DKA is resolved. Repeat hydroxybutyrate is normal. She had complaints of a cough with some yellow sputum. No fever or chills. She is complaining of having some swelling in her legs and her abdomen. She does have heart failure and takes diuretics. Physical Exam at Discharge:  /62   Pulse 93   Temp 98.8 °F (37.1 °C)   Resp 20   Ht 5' 4.5\" (1.638 m)   Wt 220 lb 14.4 oz (100.2 kg)   LMP 10/24/2012   SpO2 98%   BMI 37.33 kg/m²   Gen: No distress. Alert. Eyes: PERRL. No sclera icterus. No conjunctival injection. ENT: No discharge. Pharynx clear. Neck: Trachea midline. Normal thyroid. Resp: No accessory muscle use. No crackles. No wheezes. No rhonchi. No dullness on percussion. CV: Regular rate. Regular rhythm. No murmur or rub. + edema. GI: Non-tender. Non-distended. No masses. No organomegaly. Normal bowel sounds. No hernia. Skin: Warm and dry. No nodule on exposed extremities. No rash on exposed extremities. Lymph: No cervical LAD. No supraclavicular LAD. M/S: No cyanosis. No joint deformity. No clubbing. Neuro: Awake. Reflexes 2+ symmetric bilaterally. Moves all 4 extremities, non focal  Psych: Oriented x 3. No anxiety or agitation. Hospital Course    #  DKA. Workup was consistent with mild DKA. With insulin drip and IV fluids this is already resolved. I will start her on a carb controlled diet. Will resume insulin. Discharge in stable condition  #  Hypokalemia  Replaced. Used K replacement protocol. #  Pseudohyponatremia: due to hyperglycemia. resolved. #  CAD stable. #  Chronic systolic CHF. Will resume diuretics. #  HTN well controlled. #  CKD: monitor creatinine. #  Lovenox for DVT prophylaxis. CBC:   Recent Labs     04/08/19  1129 04/09/19  0437   WBC 14.7* 8.5   HGB 15.8 13.0   HCT 46.2 38.0   MCV 87.1 88.1    293     BMP:   Recent Labs     04/09/19  0437 04/09/19  0833 04/09/19  1308   * 128* 133*   K 3.3* 3.2* 3.7   CL 83* 84* 90*   CO2 31 32 30   PHOS 3.2 1.8* 1.4*   BUN 40* 39* 36*   CREATININE 1.4* 1.3* 1.2*     LIVER PROFILE:   Recent Labs     04/08/19  1129   AST 10*   ALT 9*   LIPASE 31.0   BILITOT 0.8   ALKPHOS 152*     PT/INR: No results for input(s): PROTIME, INR in the last 72 hours. APTT: No results for input(s): APTT in the last 72 hours. UA:No results for input(s): NITRITE, COLORU, PHUR, LABCAST, WBCUA, RBCUA, MUCUS, TRICHOMONAS, YEAST, BACTERIA, CLARITYU, SPECGRAV, LEUKOCYTESUR, UROBILINOGEN, BILIRUBINUR, BLOODU, GLUCOSEU, AMORPHOUS in the last 72 hours. Invalid input(s): KETONESU     XR CHEST PORTABLE   Final Result   No acute focal airspace consolidation.             Discharge Medications     Medication List      START taking these medications    insulin lispro 100 UNIT/ML pen  Commonly MG extended release tablet  Commonly known as:  GLUCOPHAGE-XR     metolazone 5 MG tablet  Commonly known as:  ZAROXOLYN  TAKE 1 TABLET BY MOUTH EVERY DAY     nystatin 453423 UNIT/ML suspension  Commonly known as:  MYCOSTATIN  Take 5 mLs by mouth 4 times daily     pantoprazole 40 MG tablet  Commonly known as:  PROTONIX  Take 1 tablet by mouth daily     potassium chloride 20 MEQ extended release tablet  Commonly known as:  KLOR-CON M  Take 2 tablets by mouth 3 times daily     sacubitril-valsartan 24-26 MG per tablet  Commonly known as:  ENTRESTO  Take 0.5 tablets by mouth every evening     spironolactone 100 MG tablet  Commonly known as:  ALDACTONE  Take 1 tablet by mouth 2 times daily     torsemide 100 MG tablet  Commonly known as:  DEMADEX  Take 0.5 tablets by mouth daily           Where to Get Your Medications      These medications were sent to  [facility] [address] - [address] [phone]   [place], [facility] [address]    Phone:  [phone]   · insulin glargine 100 UNIT/ML injection vial  · insulin lispro 100 UNIT/ML pen  · Insulin Pen Needle 31G X 8 MM Misc           Discharge Condition/Location: Stable    Follow Up: Follow up with PCP.         Golden Gabriel 4/10/2019 9:41 PM

## 2019-04-12 ENCOUNTER — TELEPHONE (OUTPATIENT)
Dept: ORTHOPEDIC SURGERY | Age: 54
End: 2019-04-12

## 2019-04-13 LAB
BLOOD CULTURE, ROUTINE: NORMAL
CULTURE, BLOOD 2: NORMAL

## 2019-04-15 ENCOUNTER — TELEPHONE (OUTPATIENT)
Dept: CARDIOLOGY CLINIC | Age: 54
End: 2019-04-15

## 2019-04-15 NOTE — TELEPHONE ENCOUNTER
Pt called needing a specific cardiac record. Pt sees Asuncion Donahue. Please call pt [phone]. She is hoping to hear back Tuesday 4-16-19.

## 2019-04-17 ENCOUNTER — TELEPHONE (OUTPATIENT)
Dept: CARDIOLOGY CLINIC | Age: 54
End: 2019-04-17

## 2019-04-17 NOTE — TELEPHONE ENCOUNTER
Per pt she has gained 7 lbs in 1 week from 217 to 224. Slight SOB  Swelling-all over per pt. States she has done patria transmit, but has not gotten a return call. No changes in meds.

## 2019-04-17 NOTE — TELEPHONE ENCOUNTER
Spoke with the patient, states that she has submitted a Cardiomem transmission daily since 2/14/19. I don't see yvon reports in the chart. Patient complain of shortness of breath with minimal exertion and has some swelling. Patient states that she has swelling in her face, abdomen, legs, and hands.    Torsemide 100 mg 1/2 tab daily   Metolazone 5 mg 1 daily   Lasix 20 mg prn

## 2019-04-18 ENCOUNTER — HOSPITAL ENCOUNTER (OUTPATIENT)
Dept: VASCULAR LAB | Age: 54
Discharge: HOME | End: 2019-04-18
Payer: COMMERCIAL

## 2019-04-18 PROCEDURE — 93925 LOWER EXTREMITY STUDY: CPT

## 2019-04-19 ENCOUNTER — HOSPITAL ENCOUNTER (EMERGENCY)
Age: 54
Discharge: HOME | End: 2019-04-19
Attending: EMERGENCY MEDICINE
Payer: COMMERCIAL

## 2019-04-19 ENCOUNTER — APPOINTMENT (OUTPATIENT)
Dept: GENERAL RADIOLOGY | Age: 54
End: 2019-04-19
Payer: COMMERCIAL

## 2019-04-19 ENCOUNTER — TELEPHONE (OUTPATIENT)
Dept: OTHER | Facility: CLINIC | Age: 54
End: 2019-04-19

## 2019-04-19 VITALS
RESPIRATION RATE: 18 BRPM | HEIGHT: 65 IN | SYSTOLIC BLOOD PRESSURE: 98 MMHG | OXYGEN SATURATION: 94 % | HEART RATE: 84 BPM | WEIGHT: 227 LBS | DIASTOLIC BLOOD PRESSURE: 66 MMHG | BODY MASS INDEX: 37.82 KG/M2 | TEMPERATURE: 98 F

## 2019-04-19 DIAGNOSIS — E83.42 HYPOMAGNESEMIA: ICD-10-CM

## 2019-04-19 DIAGNOSIS — I50.9 ACUTE ON CHRONIC CONGESTIVE HEART FAILURE, UNSPECIFIED HEART FAILURE TYPE (HCC): Primary | ICD-10-CM

## 2019-04-19 LAB
A/G RATIO: 1 (ref 1.1–2.2)
ALBUMIN SERPL-MCNC: 3.8 G/DL (ref 3.4–5)
ALP BLD-CCNC: 110 U/L (ref 40–129)
ALT SERPL-CCNC: 9 U/L (ref 10–40)
ANION GAP SERPL CALCULATED.3IONS-SCNC: 14 MMOL/L (ref 3–16)
AST SERPL-CCNC: 10 U/L (ref 15–37)
BASOPHILS ABSOLUTE: 0.1 K/UL (ref 0–0.2)
BASOPHILS RELATIVE PERCENT: 0.9 %
BILIRUB SERPL-MCNC: <0.2 MG/DL (ref 0–1)
BUN BLDV-MCNC: 33 MG/DL (ref 7–20)
CALCIUM SERPL-MCNC: 11 MG/DL (ref 8.3–10.6)
CHLORIDE BLD-SCNC: 86 MMOL/L (ref 99–110)
CO2: 37 MMOL/L (ref 21–32)
CREAT SERPL-MCNC: 1.3 MG/DL (ref 0.6–1.1)
EKG ATRIAL RATE: 88 BPM
EKG DIAGNOSIS: NORMAL
EKG P AXIS: 77 DEGREES
EKG P-R INTERVAL: 184 MS
EKG Q-T INTERVAL: 380 MS
EKG QRS DURATION: 142 MS
EKG QTC CALCULATION (BAZETT): 459 MS
EKG R AXIS: -66 DEGREES
EKG T AXIS: 73 DEGREES
EKG VENTRICULAR RATE: 88 BPM
EOSINOPHILS ABSOLUTE: 0.2 K/UL (ref 0–0.6)
EOSINOPHILS RELATIVE PERCENT: 1.9 %
GFR AFRICAN AMERICAN: 51
GFR NON-AFRICAN AMERICAN: 42
GLOBULIN: 3.7 G/DL
GLUCOSE BLD-MCNC: 250 MG/DL (ref 70–99)
HCT VFR BLD CALC: 41.4 % (ref 36–48)
HEMOGLOBIN: 14 G/DL (ref 12–16)
LYMPHOCYTES ABSOLUTE: 1.6 K/UL (ref 1–5.1)
LYMPHOCYTES RELATIVE PERCENT: 13.8 %
MAGNESIUM: 1.6 MG/DL (ref 1.8–2.4)
MCH RBC QN AUTO: 30 PG (ref 26–34)
MCHC RBC AUTO-ENTMCNC: 33.8 G/DL (ref 31–36)
MCV RBC AUTO: 88.8 FL (ref 80–100)
MONOCYTES ABSOLUTE: 0.6 K/UL (ref 0–1.3)
MONOCYTES RELATIVE PERCENT: 5.1 %
NEUTROPHILS ABSOLUTE: 9 K/UL (ref 1.7–7.7)
NEUTROPHILS RELATIVE PERCENT: 78.3 %
PDW BLD-RTO: 14 % (ref 12.4–15.4)
PLATELET # BLD: 396 K/UL (ref 135–450)
PMV BLD AUTO: 8.7 FL (ref 5–10.5)
POTASSIUM REFLEX MAGNESIUM: 3.5 MMOL/L (ref 3.5–5.1)
PRO-BNP: 977 PG/ML (ref 0–124)
RBC # BLD: 4.66 M/UL (ref 4–5.2)
SODIUM BLD-SCNC: 137 MMOL/L (ref 136–145)
TOTAL PROTEIN: 7.5 G/DL (ref 6.4–8.2)
TROPONIN: <0.01 NG/ML
WBC # BLD: 11.5 K/UL (ref 4–11)

## 2019-04-19 PROCEDURE — 84484 ASSAY OF TROPONIN QUANT: CPT

## 2019-04-19 PROCEDURE — 99285 EMERGENCY DEPT VISIT HI MDM: CPT

## 2019-04-19 PROCEDURE — 85025 COMPLETE CBC W/AUTO DIFF WBC: CPT

## 2019-04-19 PROCEDURE — 71046 X-RAY EXAM CHEST 2 VIEWS: CPT

## 2019-04-19 PROCEDURE — 36415 COLL VENOUS BLD VENIPUNCTURE: CPT

## 2019-04-19 PROCEDURE — 83880 ASSAY OF NATRIURETIC PEPTIDE: CPT

## 2019-04-19 PROCEDURE — 6360000002 HC RX W HCPCS: Performed by: EMERGENCY MEDICINE

## 2019-04-19 PROCEDURE — 93005 ELECTROCARDIOGRAM TRACING: CPT | Performed by: EMERGENCY MEDICINE

## 2019-04-19 PROCEDURE — 96365 THER/PROPH/DIAG IV INF INIT: CPT

## 2019-04-19 PROCEDURE — 83735 ASSAY OF MAGNESIUM: CPT

## 2019-04-19 PROCEDURE — 80053 COMPREHEN METABOLIC PANEL: CPT

## 2019-04-19 PROCEDURE — 93010 ELECTROCARDIOGRAM REPORT: CPT | Performed by: INTERNAL MEDICINE

## 2019-04-19 RX ORDER — MAGNESIUM SULFATE 1 G/100ML
1 INJECTION INTRAVENOUS ONCE
Status: COMPLETED | OUTPATIENT
Start: 2019-04-19 | End: 2019-04-19

## 2019-04-19 RX ADMIN — MAGNESIUM SULFATE HEPTAHYDRATE 1 G: 1 INJECTION, SOLUTION INTRAVENOUS at 16:43

## 2019-04-19 ASSESSMENT — ENCOUNTER SYMPTOMS
NAUSEA: 0
VOMITING: 0
COUGH: 0
SHORTNESS OF BREATH: 1
SORE THROAT: 0
EYE DISCHARGE: 0
ABDOMINAL PAIN: 0
BACK PAIN: 0
DIARRHEA: 0

## 2019-04-19 ASSESSMENT — PAIN DESCRIPTION - ORIENTATION: ORIENTATION: RIGHT;LEFT

## 2019-04-19 ASSESSMENT — PAIN DESCRIPTION - LOCATION: LOCATION: LEG

## 2019-04-19 ASSESSMENT — PAIN SCALES - GENERAL: PAINLEVEL_OUTOF10: 2

## 2019-04-19 ASSESSMENT — PAIN DESCRIPTION - PAIN TYPE: TYPE: ACUTE PAIN

## 2019-04-19 NOTE — TELEPHONE ENCOUNTER
Pt called back c/o of being swollen head to toe, bilateral legs swollen red and warm to touch 2 days. Pt said she got a doppler yesterday and it resulted in pt having clogged arteries. Pt is also SOB constant. I advised pt to go to the ED to get checked out per RAYMUNDO WILLIAM. Pt v/u.

## 2019-04-19 NOTE — TELEPHONE ENCOUNTER
Writer contacted [facility]. [facility] ER, to speak with Dr. Mary Pruitt, ED provider to inform of 30 day readmission risk. Writer's attempt to contact ED provider was unsuccessful. Spoke with Jessica Guevara RN who will notify Dr. Mary Pruitt that patient is at risk for readmission.

## 2019-04-19 NOTE — ED PROVIDER NOTES
[facility]  eMERGENCY dEPARTMENT eNCOUnter        Pt Name: Brock Street  MRN: [id number]  [place] 1965  Date of evaluation: 4/19/2019  Provider: Orlando Nolan MD  PCP: Ines Watters PA-C  ED Attending: Orlando Nolan MD    [address] [place] [address]       Chief Complaint   Patient presents with    Shortness of Breath     Patient reports she has been feeling short of breath and generalized swelling for 3 days; patient reports she called her cardiologist about her symptoms and was instructed to come to ED for evaluation       HISTORY OF PRESENT ILLNESS   (Location/Symptom, Timing/Onset, Context/Setting, Quality, Duration, Modifying Factors, Severity)  Note limiting factors. Brcok Street is a 52 y.o. female who presents to the emergency department with a few days of shortness of breath, weight gain, and lower extremity swelling. Patient has a history of congestive heart failure. She recently was admitted to the hospital for diabetic ketoacidosis and received a significant amount of fluids. Patient states for the last several days she has had the above symptoms. She talked her cardiologist who then recommended evaluation in the emergency department. Patient has apparently had aching pain with deep inspiration to her bilateral chest.     History is obtained from the patient, , recent discharge summary. REVIEW OF SYSTEMS    (2-9 systems for level 4, 10 or more for level 5)     Review of Systems   Constitutional: Negative for chills and fever. HENT: Negative for congestion and sore throat. Eyes: Negative for discharge. Respiratory: Positive for shortness of breath. Negative for cough. Cardiovascular: Positive for leg swelling. Negative for chest pain and palpitations. Gastrointestinal: Negative for abdominal pain, diarrhea, nausea and vomiting. Endocrine: Negative for polydipsia. Genitourinary: Negative for dysuria and urgency.    Musculoskeletal: Positive for myalgias. Negative for back pain. Skin: Negative for rash. Neurological: Negative for weakness and headaches. Hematological: Does not bruise/bleed easily. Psychiatric/Behavioral: Negative for confusion. Positives and Pertinent negatives as per HPI. Except as noted abovein the ROS, all other systems were reviewed and negative.        PAST MEDICAL HISTORY     Past Medical History:   Diagnosis Date    Anxiety and depression     CAD (coronary artery disease) 01/2018    Cardiomyopathy ([facility]     CHF (congestive heart failure) (HCC)     COPD (chronic obstructive pulmonary disease) (HCC)     Diabetes mellitus (HCC)     GERD (gastroesophageal reflux disease)     Hyperlipidemia     Hypertension     Peripheral neuropathy     Peripheral vascular disease (HCC)     Pulmonary HTN ([facility]     Reflux     Sleep apnea     has never done sleep study;does not use CPAP    Wears glasses     for reading         SURGICAL HISTORY     Past Surgical History:   Procedure Laterality Date    ARTERIAL BYPASS SURGRY Left 01/06/2016    Axillary/Subclavian to Brachial Bypass w/reversed SVG    CARDIAC CATHETERIZATION  03/27/2018    [facility] [address] [facility] [facility]      pancreatitis post surgery    CORONARY ANGIOPLASTY WITH STENT PLACEMENT  03/16/2018    MELODY- 3.5 x 38 and 3.5 x 20 to Cx    CORONARY ANGIOPLASTY WITH STENT PLACEMENT  01/03/2018    MELODY- 3.0 x 38 and 2.75 x 26 and 2.75 x 8 and 2.75 x 22 to the LAD    INSERTABLE CARDIAC MONITOR  02/14/2019    CardioMEMs insertion for CHF    ROTATOR CUFF REPAIR Left 04/22/2016    TONSILLECTOMY      TUMOR EXCISION      benign tumors X 2 chest & axilla    UPPER GASTROINTESTINAL ENDOSCOPY  4/25/2013    BX barretts, HH, gastritis    UPPER GASTROINTESTINAL ENDOSCOPY  12/10/2015    UPPER GASTROINTESTINAL ENDOSCOPY  01/06/2017    Gastritis         CURRENTMEDICATIONS       Discharge Medication List as of 4/19/2019  6:01 PM CONTINUE these medications which have NOT CHANGED    Details   insulin glargine (LANTUS) 100 UNIT/ML injection vial Inject 35 Units into the skin 2 times daily, Disp-3 vial, R-3Normal      insulin lispro (HUMALOG KWIKPEN) 100 UNIT/ML pen Inject 15 Units into the skin 3 times daily (before meals), Disp-5 pen, R-3Normal      Insulin Pen Needle (B-D ULTRAFINE III SHORT PEN) 31G X 8 MM MISC Disp-200 each, R-2, NormalFive shots a day      metolazone (ZAROXOLYN) 5 MG tablet TAKE 1 TABLET BY MOUTH EVERY DAY, Disp-30 tablet, R-1Normal      digoxin (LANOXIN) 125 MCG tablet Take 1 tablet by mouth daily, Disp-30 tablet, R-3Normal      spironolactone (ALDACTONE) 100 MG tablet Take 1 tablet by mouth 2 times daily, Disp-60 tablet, R-5Normal      albuterol sulfate HFA (VENTOLIN HFA) 108 (90 Base) MCG/ACT inhaler Inhale 2 puffs into the lungs every 6 hours as needed for Wheezing or Shortness of Breath, Disp-1 Inhaler, R-6Normal      sacubitril-valsartan (ENTRESTO) 24-26 MG per tablet Take 0.5 tablets by mouth every evening, Disp-60 tablet, R-0NO PRINT      potassium chloride (KLOR-CON M) 20 MEQ extended release tablet Take 2 tablets by mouth 3 times daily, Disp-180 tablet, R-3Normal      clopidogrel (PLAVIX) 75 MG tablet Take 1 tablet by mouth daily, Disp-30 tablet, R-5Normal      torsemide (DEMADEX) 100 MG tablet Take 0.5 tablets by mouth daily, Disp-30 tablet, R-3Normal      tiotropium (SPIRIVA RESPIMAT) 2.5 MCG/ACT AERS inhaler Inhale 2 puffs into the lungs daily, Disp-1 Inhaler, R-6Normal      carvedilol (COREG) 3.125 MG tablet Take 1 tablet by mouth 2 times daily (with meals), Disp-60 tablet, R-5Normal      magnesium oxide (MAG-OX) 400 MG tablet Take 1 tablet by mouth daily, Disp-30 tablet, R-3Normal      albuterol (PROVENTIL) (2.5 MG/3ML) 0.083% nebulizer solution Inhale 3 mLs into the lungsHistorical Med      atorvastatin (LIPITOR) 80 MG tablet Take 80 mg by mouth nightlyHistorical Med      busPIRone (BUSPAR) 30 MG tablet 40 - 129 U/L    ALT 9 (L) 10 - 40 U/L    AST 10 (L) 15 - 37 U/L    Globulin 3.7 g/dL   Troponin   Result Value Ref Range    Troponin <0.01 <0.01 ng/mL   Brain Natriuretic Peptide   Result Value Ref Range    Pro- (H) 0 - 124 pg/mL   Magnesium   Result Value Ref Range    Magnesium 1.60 (L) 1.80 - 2.40 mg/dL   EKG 12 Lead   Result Value Ref Range    Ventricular Rate 88 BPM    Atrial Rate 88 BPM    P-R Interval 184 ms    QRS Duration 142 ms    Q-T Interval 380 ms    QTc Calculation (Bazett) 459 ms    P Axis 77 degrees    R Axis -66 degrees    T Axis 73 degrees    Diagnosis       Normal sinus rhythmRight bundle branch blockLeft anterior fascicular block Bifascicular block Septal infarct (cited on or before 23-MAY-2018)Abnormal ECGWhen compared with ECG of 08-APR-2019 16:55,Borderline criteria for Lateral infarct are no longer PresentQT has shortenedConfirmed by Thomas Aguillon MD, [address] ([facility]) on 4/19/2019 3:54:14 PM       All other labs were within normal range ornot returned as of this dictation. EKG: All EKG's are interpreted by the Emergency Department Physician who either signs or Co-signs this chart in the absence of a cardiologist.  Please see their note for interpretation of EKG. EKG Interpretation    Interpreted by emergency department physician    Rhythm: normal sinus   Rate: normal  Axis: normal  Ectopy: none  Conduction: right bundle branch block (complete) and left anterior fasciclar block  ST Segments: normal  T Waves: normal  Q Waves: septal    Clinical Impression: Normal sinus rhythm, right bundle branch block, left anterior fascicular block, evidence of previous septal infarction.       RADIOLOGY:   Non-plain film images such as CT, Ultrasound and MRI are read by the radiologist.Plain radiographic images are visualized and preliminarily interpreted by the  ED Provider with the belowfindings:    Interpretation per the Radiologist below, if available at the time of this note:    XR CHEST STANDARD (2 VW) Final Result   No acute pulmonary disease. Calcific atherosclerotic disease aorta. PROCEDURES   Unless otherwise noted below, none     Procedures    CRITICAL CARE TIME   N/A    CONSULTS:  None      EMERGENCY DEPARTMENT COURSE and DIFFERENTIAL DIAGNOSIS/MDM:   Vitals:    Vitals:    04/19/19 1517 04/19/19 1554 04/19/19 1810   BP: 117/71 106/68 98/66   Pulse: 96 85 84   Resp: 16 17 18   Temp: 98 °F (36.7 °C)     TempSrc: Oral     SpO2: 96% 94% 94%   Weight: 227 lb (103 kg)     Height: 5' 4.5\" (1.638 m)         Patient was given the following medications:  Medications   magnesium sulfate 1 g in dextrose 5% 100 mL IVPB (0 g Intravenous Stopped 4/19/19 1808)     Patient sounds as if she is having beginnings of a chronic CHF exacerbation. Her workup at this time is consistent with this however she does not have any pulmonary vascular congestion, is not in any respiratory distress, and is not requiring supplemental oxygen. I do not believe admission is needed at this time. I believe we can safely diurese the patient with changes in her medications at home. Patient was found to be hypomagnesemic and was supplemented. I am doubling her magnesium dose for the next 10 days. I am also doubling her torsemide dose for the next 5 days. We were able to arrange for her to be seen by her cardiologist in the next few days as well. Patient is agreeable with this plan. I estimate there is LOW risk for ACUTE CORONARY SYNDROME, CHRONIC OBSTRUCTIVE PULMONARY DISEASE, ADVANCED CONGESTIVE HEART FAILURE, PERICARDIAL TAMPONADE, PNEUMONIA, PNEUMOTHORAX, PULMONARY EMBOLISM, SEPSIS, and THORACIC DISSECTION,  thus I consider the discharge disposition reasonable. [facility] and I have discussed the diagnosis and risks, and we agree with discharging home to follow-up with their primary doctor. We also discussed returning to the Emergency Department immediately if new or worsening symptoms occur.  We have discussed the symptoms which are most concerning (e.g., bloody sputum, fever, worsening pain or shortness of breath, vomiting) that necessitate immediate return. The patient understands the importance of follow up and reasons to return. FINAL IMPRESSION      1. Acute on chronic congestive heart failure, unspecified heart failure type ([address]    2. Hypomagnesemia          DISPOSITION/PLAN   DISPOSITION Decision To Discharge 04/19/2019 05:50:29 PM      PATIENT REFERRED TO:  Issa Sorenson PA-C  [address]   [phone]    Schedule an appointment as soon as possible for a visit in 1 week      Nima Osei, [facility] [address]  [phone]    Go on 4/22/2019  Appointment scheduled for 11:00am    [facility] Emergency Department  [address] [facility] [address]  [phone]  Go to   If symptoms worsen      DISCHARGE MEDICATIONS:  Discharge Medication List as of 4/19/2019  6:01 PM          DISCONTINUED MEDICATIONS:  Discharge Medication List as of 4/19/2019  6:01 PM                 (Please note that portions of this note were completed with a voice recognition program.  Efforts were [facility] edit the dictations but occasionally words are mis-transcribed.)    Senait Jasmine MD(electronically signed)              Senait Jasmine MD  04/20/19 [id number]

## 2019-04-22 ENCOUNTER — OFFICE VISIT (OUTPATIENT)
Dept: CARDIOLOGY CLINIC | Age: 54
End: 2019-04-22
Payer: COMMERCIAL

## 2019-04-22 VITALS
OXYGEN SATURATION: 96 % | DIASTOLIC BLOOD PRESSURE: 60 MMHG | HEART RATE: 98 BPM | HEIGHT: 64 IN | BODY MASS INDEX: 38.35 KG/M2 | SYSTOLIC BLOOD PRESSURE: 90 MMHG | WEIGHT: 224.6 LBS

## 2019-04-22 DIAGNOSIS — G47.33 OSA (OBSTRUCTIVE SLEEP APNEA): ICD-10-CM

## 2019-04-22 DIAGNOSIS — I25.10 CORONARY ARTERY DISEASE INVOLVING NATIVE CORONARY ARTERY OF NATIVE HEART WITHOUT ANGINA PECTORIS: ICD-10-CM

## 2019-04-22 DIAGNOSIS — Z72.0 TOBACCO ABUSE: ICD-10-CM

## 2019-04-22 DIAGNOSIS — I34.0 MITRAL VALVE INSUFFICIENCY, UNSPECIFIED ETIOLOGY: ICD-10-CM

## 2019-04-22 DIAGNOSIS — N18.30 CKD (CHRONIC KIDNEY DISEASE) STAGE 3, GFR 30-59 ML/MIN (HCC): ICD-10-CM

## 2019-04-22 DIAGNOSIS — Z79.4 TYPE 2 DIABETES MELLITUS WITH STAGE 3 CHRONIC KIDNEY DISEASE, WITH LONG-TERM CURRENT USE OF INSULIN (HCC): ICD-10-CM

## 2019-04-22 DIAGNOSIS — E78.2 MIXED HYPERLIPIDEMIA: ICD-10-CM

## 2019-04-22 DIAGNOSIS — I73.9 PVD (PERIPHERAL VASCULAR DISEASE) (HCC): ICD-10-CM

## 2019-04-22 DIAGNOSIS — E11.22 TYPE 2 DIABETES MELLITUS WITH STAGE 3 CHRONIC KIDNEY DISEASE, WITH LONG-TERM CURRENT USE OF INSULIN (HCC): ICD-10-CM

## 2019-04-22 DIAGNOSIS — I50.22 CHRONIC SYSTOLIC CONGESTIVE HEART FAILURE (HCC): Primary | ICD-10-CM

## 2019-04-22 DIAGNOSIS — I25.5 ISCHEMIC CARDIOMYOPATHY: ICD-10-CM

## 2019-04-22 DIAGNOSIS — N18.30 TYPE 2 DIABETES MELLITUS WITH STAGE 3 CHRONIC KIDNEY DISEASE, WITH LONG-TERM CURRENT USE OF INSULIN (HCC): ICD-10-CM

## 2019-04-22 DIAGNOSIS — I10 ESSENTIAL HYPERTENSION: ICD-10-CM

## 2019-04-22 DIAGNOSIS — I73.9 PVD (PERIPHERAL VASCULAR DISEASE) (HCC): Primary | ICD-10-CM

## 2019-04-22 PROCEDURE — G8427 DOCREV CUR MEDS BY ELIG CLIN: HCPCS | Performed by: NURSE PRACTITIONER

## 2019-04-22 PROCEDURE — 2022F DILAT RTA XM EVC RTNOPTHY: CPT | Performed by: NURSE PRACTITIONER

## 2019-04-22 PROCEDURE — 4004F PT TOBACCO SCREEN RCVD TLK: CPT | Performed by: NURSE PRACTITIONER

## 2019-04-22 PROCEDURE — 1111F DSCHRG MED/CURRENT MED MERGE: CPT | Performed by: NURSE PRACTITIONER

## 2019-04-22 PROCEDURE — G8417 CALC BMI ABV UP PARAM F/U: HCPCS | Performed by: NURSE PRACTITIONER

## 2019-04-22 PROCEDURE — G8598 ASA/ANTIPLAT THER USED: HCPCS | Performed by: NURSE PRACTITIONER

## 2019-04-22 PROCEDURE — 3046F HEMOGLOBIN A1C LEVEL >9.0%: CPT | Performed by: NURSE PRACTITIONER

## 2019-04-22 PROCEDURE — 99214 OFFICE O/P EST MOD 30 MIN: CPT | Performed by: NURSE PRACTITIONER

## 2019-04-22 PROCEDURE — 3017F COLORECTAL CA SCREEN DOC REV: CPT | Performed by: NURSE PRACTITIONER

## 2019-04-22 ASSESSMENT — ENCOUNTER SYMPTOMS
WHEEZING: 1
COUGH: 1
ABDOMINAL DISTENTION: 1
SHORTNESS OF BREATH: 1
FACIAL SWELLING: 1

## 2019-04-22 NOTE — PROGRESS NOTES
[address]   Cardiac Evaluation    Primary Care Doctor:  Blanquita Neil [place]    Chief Complaint   Patient presents with    Follow-Up from [address] Swelling    Leg Swelling    Edema     bilateral hands        History of Present Illness:   I had the pleasure of seeing Dannielle Sanford in follow up for  CAD, MR, sCHF, ICM, HLD, HTN as well as PAD, DM, CLINT and smoker. She underwent placement of CardioMEMS on 2/91/12 without complication. She was in ED on 4/19/19 for SOB, weight gain and edema. Her oral torsemide dose was increased to BID. She was given supplemental magnesium for level of 1.6. Of note BNP was elevated (282->977). PAD per CardioMEMs increased from 25 on 4/10/19 to 33 on 4/18/19. She does not feel the torsemide increase has made any difference. Her main complaint if leg/ foot pain with standing or any walking. Her weight at home was 223 lbs. Her CardioMEMS is currently broken, to call representative today. Her blood sugars are doing better, lower, even dropped to 58. She continues to have edema all over but mostly in legs and abdomen. Her sleep remains poor. She continues to smoke ~ 1/2 ppd, struggling to quit though knows she needs to.                 Dannielle Sanford describes symptoms including dyspnea, orthopnea, fatigue, edema, claudication pain but denies chest pain, palpitations, PND, early saiety, syncope. NYHA:   III  ACC/ AHA Stage:    C    Past Medical History:   has a past medical history of Anxiety and depression, CAD (coronary artery disease), Cardiomyopathy ([address] CHF (congestive heart failure) ([address] COPD (chronic obstructive pulmonary disease) ([address] Diabetes mellitus ([address] GERD (gastroesophageal reflux disease), Hyperlipidemia, Hypertension, Peripheral neuropathy, Peripheral vascular disease ([address] Pulmonary HTN ([address] Reflux, Sleep apnea, and Wears glasses.   Surgical History:   has a past surgical history that includes Tonsillectomy; Cholecystectomy; tumor excision; Upper gastrointestinal endoscopy (4/25/2013); Upper gastrointestinal endoscopy (12/10/2015); Upper gastrointestinal endoscopy (01/06/2017); Arterial bypass surgry (Left, 01/06/2016); Cardiac catheterization (03/27/2018); Coronary angioplasty with stent (03/16/2018); Rotator cuff repair (Left, 04/22/2016); Coronary angioplasty with stent (01/03/2018); and Insertable Cardiac Monitor (02/14/2019). Social History:   reports that she has been smoking cigarettes. She has a 15.00 pack-year smoking history. She has never used smokeless tobacco. She reports that she does not drink alcohol or use drugs. Family History:   Family History   Problem Relation Age of Onset    Heart Disease Maternal Grandmother     Cancer Mother         lung and brain cancer    Cancer Maternal Aunt         lung and brain cancer       Home Medications:  Prior to Admission medications    Medication Sig Start Date End Date Taking?  Authorizing Provider   insulin glargine (LANTUS) 100 UNIT/ML injection vial Inject 35 Units into the skin 2 times daily 4/9/19  Yes Lino Stone MD   insulin lispro (HUMALOG KWIKPEN) 100 UNIT/ML pen Inject 15 Units into the skin 3 times daily (before meals) 4/9/19  Yes Lino Stone MD   Insulin Pen Needle (B-D ULTRAFINE III SHORT PEN) 31G X 8 MM MISC Five shots a day 4/9/19  Yes Lino Stone MD   metolazone (ZAROXOLYN) 5 MG tablet TAKE 1 TABLET BY MOUTH EVERY DAY 4/3/19  Yes Sonya ArtTRAY - CNP   digoxin (LANOXIN) 125 MCG tablet Take 1 tablet by mouth daily 4/3/19  Yes Denna Dianne Gosselin, APRN - CNP   spironolactone (ALDACTONE) 100 MG tablet Take 1 tablet by mouth 2 times daily 3/13/19  Yes TRAY Salgado CNP   albuterol sulfate HFA (VENTOLIN HFA) 108 (90 Base) MCG/ACT inhaler Inhale 2 puffs into the lungs every 6 hours as needed for Wheezing or Shortness of Breath 3/6/19  Yes Jamir Almazan MD   sacubitril-valsartan (ENTRESTO) 24-26 MG per tablet Take 0.5 tablets by mouth every evening 2/25/19  Yes TRAY Leija CNP   potassium chloride (KLOR-CON M) 20 MEQ extended release tablet Take 2 tablets by mouth 3 times daily 2/6/19  Yes TRAY Leija CNP   clopidogrel (PLAVIX) 75 MG tablet Take 1 tablet by mouth daily 1/31/19  Yes TRAY Leija CNP   nystatin (MYCOSTATIN) 071782 UNIT/ML suspension Take 5 mLs by mouth 4 times daily 1/9/19  Yes TRAY Leija CNP   torsemide (DEMADEX) 100 MG tablet Take 0.5 tablets by mouth daily  Patient taking differently: Take 100 mg by mouth daily Indications: 1 tab po daily  1/9/19  Yes TRAY Leija CNP   tiotropium (SPIRIVA RESPIMAT) 2.5 MCG/ACT AERS inhaler Inhale 2 puffs into the lungs daily 12/4/18  Yes Laurie York MD   carvedilol (COREG) 3.125 MG tablet Take 1 tablet by mouth 2 times daily (with meals) 11/28/18  Yes TRAY Leija CNP   magnesium oxide (MAG-OX) 400 MG tablet Take 1 tablet by mouth daily 11/15/18  Yes TRAY Leija CNP   albuterol (PROVENTIL) (2.5 MG/3ML) 0.083% nebulizer solution Inhale 3 mLs into the lungs 7/5/18  Yes Historical Provider, MD   atorvastatin (LIPITOR) 80 MG tablet Take 80 mg by mouth nightly 5/2/18  Yes Historical Provider, MD   busPIRone (BUSPAR) 30 MG tablet Take 30 mg by mouth 3 times daily 4/2/18  Yes Historical Provider, MD   metFORMIN (GLUCOPHAGE-XR) 500 MG extended release tablet Take 1,000 mg by mouth 2 times daily 4/2/18  Yes Historical Provider, MD   buprenorphine-naloxone (SUBOXONE) 8-2 MG SUBL SL tablet Place 2 tablets under the tongue daily. .   Yes Historical Provider, MD   doxepin (SINEQUAN) 50 MG capsule Take 50 mg by mouth nightly   Yes Historical Provider, MD   nitroGLYCERIN (NITROSTAT) 0.4 MG SL tablet Place 0.4 mg under the tongue every 5 minutes as needed for Chest pain (times 3) up to max of 3 total doses. If no relief after 1 dose, call 911.    Yes Historical Provider, MD   aspirin EC 81 MG EC tablet Take 1 tablet by mouth daily 1/8/16  Yes Evan Akins MD   pantoprazole (PROTONIX) 40 MG tablet Take 1 tablet by mouth daily  Patient taking differently: Take 40 mg by mouth 2 times daily  12/11/15  Yes Beto Sánchez MD   ARIPiprazole (ABILIFY) 10 MG tablet Take 5 mg by mouth daily    Yes Historical Provider, MD   lamoTRIgine (LAMICTAL) 150 MG tablet Take 150 mg by mouth 2 times daily    Yes Historical Provider, MD        Allergies:  Lisinopril     Review of Systems:   Review of Systems   Constitutional: Positive for activity change and fatigue. Negative for appetite change. HENT: Positive for facial swelling. Eyes: Positive for visual disturbance. Respiratory: Positive for cough, shortness of breath and wheezing. Cardiovascular: Positive for leg swelling. Negative for chest pain and palpitations. Gastrointestinal: Positive for abdominal distention. Endocrine: Positive for polydipsia and polyuria. Neurological: Positive for weakness. Negative for dizziness, syncope and light-headedness. Psychiatric/Behavioral: Positive for sleep disturbance. Physical Examination:    Vitals:    04/22/19 1426   BP: 90/60   Pulse: 98   SpO2: 96%   Weight: 224 lb 9.6 oz (101.9 kg)   Height: 5' 4\" (1.626 m)        Constitutional and General Appearance: Warm and dry, no apparent distress, normal coloration  HEENT:  Normocephalic, atraumatic  Respiratory:  · Normal excursion and expansion without use of accessory muscles  · Resp Auscultation: Bilateral inspiratory and expiratory wheezes  Cardiovascular:  · The apical impulses not displaced  · Heart tones are crisp and normal  · JVP 9-10 cm H2O  · Regular rate and rhythm, normal S1S2, no m/g/r  · Peripheral pulses are symmetrical and full  · There is no clubbing, cyanosis of the extremities.   · 2+ BLE edema, with redness R > L, warm  · Pedal Pulses: not palpable   Abdomen:  · No masses or tenderness, obese, distended  · Liver/Spleen: No Abnormalities Noted  Neurological/Psychiatric:  · Alert and oriented in all spheres  · Moves all extremities well  · Exhibits normal gait balance and coordination  · No abnormalities of mood, affect, memory, mentation, or behavior are noted    Lab Data:  Most recent lab results below reviewed in office    CBC:   Lab Results   Component Value Date    WBC 11.5 04/19/2019    WBC 8.5 04/09/2019    WBC 14.7 04/08/2019    RBC 4.66 04/19/2019    RBC 4.32 04/09/2019    RBC 5.31 04/08/2019    HGB 14.0 04/19/2019    HGB 13.0 04/09/2019    HGB 15.8 04/08/2019    HCT 41.4 04/19/2019    HCT 38.0 04/09/2019    HCT 46.2 04/08/2019    MCV 88.8 04/19/2019    MCV 88.1 04/09/2019    MCV 87.1 04/08/2019    RDW 14.0 04/19/2019    RDW 14.0 04/09/2019    RDW 13.9 04/08/2019     04/19/2019     04/09/2019     04/08/2019     BMP:  Lab Results   Component Value Date     04/19/2019     04/09/2019     04/09/2019    K 3.5 04/19/2019    K 3.7 04/09/2019    K 3.2 04/09/2019    K 3.3 04/09/2019    K 4.2 05/27/2018    K 4.2 05/24/2018    CL 86 04/19/2019    CL 90 04/09/2019    CL 84 04/09/2019    CO2 37 04/19/2019    CO2 30 04/09/2019    CO2 32 04/09/2019    PHOS 1.4 04/09/2019    PHOS 1.8 04/09/2019    PHOS 3.2 04/09/2019    BUN 33 04/19/2019    BUN 36 04/09/2019    BUN 39 04/09/2019    CREATININE 1.3 04/19/2019    CREATININE 1.2 04/09/2019    CREATININE 1.3 04/09/2019     BNP:   Lab Results   Component Value Date    PROBNP 977 04/19/2019    PROBNP 282 03/13/2019    PROBNP 835 03/04/2019     LIPID:   Lab Results   Component Value Date    TRIG 255 11/28/2018    TRIG 305 08/01/2017    HDL 25 11/28/2018    HDL 41 08/01/2017    LDLCALC 76 11/28/2018    LDLCALC see below 08/01/2017    LDLDIRECT 187 08/01/2017       Cardiac Imaging:  BLE ZACH's 4/18/19:   1. There is severe arterial insufficiency at rest involving the right lower    extremity.  There occlusive disease of the right proximal superficial femoral  (noted stent) and mid posterior tibial arteries. There is a 50-74% stenosis    at 4the right deep femoral artery with evidence of inflow disease.    2. There is severe arterial insufficiency at rest involving the left lower    extremity. There is occlusive disease of the left proximal superficial    femoral artery (noted stent). There is a 30-49% stenosis at the left deep    femoral artery with evidence of inflow disease.           ECHO 4/3/19:   Vida Hurst systolic function is mildly reduced with EF estimated at 40-45%.   There is severe HK of the apex and apical-septal segment.   Normal left ventricular diastolic filling pressure.   Mild mitral regurgitation.   Systolic pulmonary artery pressure (SPAP) estimated at 32mmHg (RA pressure 3mmHg). Arterial doppler studies 8/2/18:   Vicie Pastel is no arterial insufficiency at rest involving the lower extremities    bilaterally, however, there is evidence to suggest calf vessel disease    bilaterally. [facility] 3/26/18 [employer] promus premier [id number] CCx and [id number] CCx. Echo: 5/23/18:    Ejection fraction is visually estimated to be 30-35 %. There is akinesis of the apex and inferior walls. Left ventricular size is mildly increased. Moderate mitral regurgitation. Moderate amount plaque is noted in the aorta. The left atrium is mildly dilated. 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 A bubble study was performed and fails to show evidence of shunting. Procedure performed: Transesophageal echocardiogram 5/25/18:    Findings:  Reduced left ventricular function with ejection fraction estimated at about 35% with apical and inferior akinesis    The cardiac valves show moderate mitral regurgitation  There is no evidence for an intracardiac shunt or intracardiac thrombus. Cardiac cath 5/25/18:   Procedure Performed:  1. Coronary angiogram  2. Left heart cath  3. Left ventriculogram  4. Right heart cath   Findings:  1.  Patent LAD

## 2019-04-22 NOTE — PATIENT INSTRUCTIONS
1. Change the torsemide to 50 mg twice daily along with metolazone 5 mg twice daily for the next 3 days  2. Increase the potassium to 2 pills 3 times per day for next 3 days  3. Schedule CT angiogram of aorta with runoff to look at legs  4. Repeat blood work later this week or next week (depending on when we can get procedure scheduled)  5.  Keep appointment with me June 5 th

## 2019-04-23 ENCOUNTER — HOSPITAL ENCOUNTER (OUTPATIENT)
Age: 54
Discharge: HOME | End: 2019-04-23
Payer: COMMERCIAL

## 2019-04-23 ENCOUNTER — TELEPHONE (OUTPATIENT)
Dept: CARDIOLOGY CLINIC | Age: 54
End: 2019-04-23

## 2019-04-23 ENCOUNTER — HOSPITAL ENCOUNTER (OUTPATIENT)
Dept: CT IMAGING | Age: 54
Discharge: HOME | End: 2019-04-23
Payer: COMMERCIAL

## 2019-04-23 DIAGNOSIS — I25.5 ISCHEMIC CARDIOMYOPATHY: ICD-10-CM

## 2019-04-23 DIAGNOSIS — E78.2 MIXED HYPERLIPIDEMIA: ICD-10-CM

## 2019-04-23 DIAGNOSIS — I73.9 PVD (PERIPHERAL VASCULAR DISEASE) (HCC): ICD-10-CM

## 2019-04-23 DIAGNOSIS — I25.5 ISCHEMIC CARDIOMYOPATHY: Primary | ICD-10-CM

## 2019-04-23 DIAGNOSIS — I25.10 CORONARY ARTERY DISEASE INVOLVING NATIVE CORONARY ARTERY OF NATIVE HEART WITHOUT ANGINA PECTORIS: ICD-10-CM

## 2019-04-23 LAB
CREAT SERPL-MCNC: 1.2 MG/DL (ref 0.6–1.1)
GFR AFRICAN AMERICAN: 56
GFR NON-AFRICAN AMERICAN: 47

## 2019-04-23 PROCEDURE — 82565 ASSAY OF CREATININE: CPT

## 2019-04-23 PROCEDURE — 6360000004 HC RX CONTRAST MEDICATION: Performed by: NURSE PRACTITIONER

## 2019-04-23 PROCEDURE — 75635 CT ANGIO ABDOMINAL ARTERIES: CPT

## 2019-04-23 PROCEDURE — 36415 COLL VENOUS BLD VENIPUNCTURE: CPT

## 2019-04-23 RX ADMIN — IOPAMIDOL 125 ML: 755 INJECTION, SOLUTION INTRAVENOUS at 11:41

## 2019-04-23 NOTE — TELEPHONE ENCOUNTER
Pt having CT with run off today at 10:30. Pt needs CREATININE ordered STAT. Last lab GFR was very low 4/19/19.   TY

## 2019-04-26 ENCOUNTER — OFFICE VISIT (OUTPATIENT)
Dept: CARDIOLOGY CLINIC | Age: 54
End: 2019-04-26
Payer: COMMERCIAL

## 2019-04-26 VITALS
SYSTOLIC BLOOD PRESSURE: 102 MMHG | DIASTOLIC BLOOD PRESSURE: 58 MMHG | OXYGEN SATURATION: 96 % | HEIGHT: 64 IN | BODY MASS INDEX: 38.24 KG/M2 | WEIGHT: 224 LBS | HEART RATE: 96 BPM

## 2019-04-26 DIAGNOSIS — I25.10 CORONARY ARTERY DISEASE INVOLVING NATIVE CORONARY ARTERY OF NATIVE HEART WITHOUT ANGINA PECTORIS: Primary | ICD-10-CM

## 2019-04-26 DIAGNOSIS — I73.9 PVD (PERIPHERAL VASCULAR DISEASE) (HCC): ICD-10-CM

## 2019-04-26 DIAGNOSIS — E78.2 MIXED HYPERLIPIDEMIA: Chronic | ICD-10-CM

## 2019-04-26 DIAGNOSIS — I10 ESSENTIAL HYPERTENSION: Chronic | ICD-10-CM

## 2019-04-26 DIAGNOSIS — I34.0 MITRAL VALVE INSUFFICIENCY, UNSPECIFIED ETIOLOGY: Chronic | ICD-10-CM

## 2019-04-26 PROCEDURE — 1111F DSCHRG MED/CURRENT MED MERGE: CPT | Performed by: INTERNAL MEDICINE

## 2019-04-26 PROCEDURE — 4004F PT TOBACCO SCREEN RCVD TLK: CPT | Performed by: INTERNAL MEDICINE

## 2019-04-26 PROCEDURE — 3017F COLORECTAL CA SCREEN DOC REV: CPT | Performed by: INTERNAL MEDICINE

## 2019-04-26 PROCEDURE — G8598 ASA/ANTIPLAT THER USED: HCPCS | Performed by: INTERNAL MEDICINE

## 2019-04-26 PROCEDURE — G8427 DOCREV CUR MEDS BY ELIG CLIN: HCPCS | Performed by: INTERNAL MEDICINE

## 2019-04-26 PROCEDURE — 99215 OFFICE O/P EST HI 40 MIN: CPT | Performed by: INTERNAL MEDICINE

## 2019-04-26 PROCEDURE — G8417 CALC BMI ABV UP PARAM F/U: HCPCS | Performed by: INTERNAL MEDICINE

## 2019-04-26 NOTE — PATIENT INSTRUCTIONS
1. Peripheral angiogram due to abnormal CT abd with run off. Stop metformin 2 days prior to procedure and morning. No insulin the am of procedure  2.  Follow up after procedure

## 2019-04-26 NOTE — PROGRESS NOTES
[facility]   CARDIAC EVALUATION NOTE  [phone]      PCP:  Melissa Moyer PA-C    Reason for Consultation/Chief Complaint: leg cramping, PAD    Subjective   History of Present Illness:  Wong Marrero is a 52 y.o. patient with a history of CAD, CM, CHF, COPD, PVD, Pulm HTN, HTN, HLD and DM who presents for abnormal CTA abd with run off. Her LHC from 05/25/18 showed patent LAD and Circ stents with reduced EF of 30%. Her echo from 04/03/19 showed her EF was 40-45% with hypokinetic of the apex and apical-septal segment and mild MR. Her carotid doppler from 05/24/18 showed <50% plaque bilaterally. Her BLE doppler from 04/18/19 showed severe arterial insufficiency of BLE. There occlusive disease of the right proximal superficial femoral  (noted stent) and mid posterior tibial arteries. There is a 50-74% stenosis  at 4the right deep femoral artery with evidence of inflow disease. There is occlusive disease of the left proximal superficial  femoral artery (noted stent). There is a 30-49% stenosis at the left deep  femoral artery with evidence of inflow disease. Her RHC from 02/14/19 showed pulm venous HTN with elevated pressures and PWCP of 28. She also had successful placement of CardioMems HF monitoring device n the left pulm artery. 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 There is at least mild stenosis of the celiac artery and bilateral renal arteries. A moderate amount of atherosclerotic plaque is seen within the infrarenal abdominal aorta and both iliac arteries causing mild stenosis. Severe outflow disease. There is complete occlusion of the right SFA stent along its entire course with reconstitution distally. There is complete occlusion of the left proximal short SFA stent and the remainder of the left SFA with reconstitution distally. Runoff disease bilaterally.   There is single vessel runoff to the right foot and 2 vessel runoff to the left foot. She reports she has leg cramping after walking a few as 20 steps. She states she needs to sit down to rest her legs due to the pain. She denies CP, SOB, dizziness or syncope. She does not have wounds but feels same to worse as w/ prior claudication. Past Medical History:   has a past medical history of Anxiety and depression, CAD (coronary artery disease), Cardiomyopathy ([address] CHF (congestive heart failure) ([address] COPD (chronic obstructive pulmonary disease) ([address] Diabetes mellitus ([address] GERD (gastroesophageal reflux disease), Hyperlipidemia, Hypertension, Peripheral neuropathy, Peripheral vascular disease ([address] Pulmonary HTN ([address] Reflux, Sleep apnea, and Wears glasses. Surgical History:   has a past surgical history that includes Tonsillectomy; Cholecystectomy; tumor excision; Upper gastrointestinal endoscopy (4/25/2013); Upper gastrointestinal endoscopy (12/10/2015); Upper gastrointestinal endoscopy (01/06/2017); Arterial bypass surgry (Left, 01/06/2016); Cardiac catheterization (03/27/2018); Coronary angioplasty with stent (03/16/2018); Rotator cuff repair (Left, 04/22/2016); Coronary angioplasty with stent (01/03/2018); and Insertable Cardiac Monitor (02/14/2019). Social History:   reports that she has been smoking cigarettes. She has a 15.00 pack-year smoking history. She has never used smokeless tobacco. She reports that she does not drink alcohol or use drugs. Family History:  family history includes Cancer in her maternal aunt and mother; Heart Disease in her maternal grandmother. Home Medications:  Were reviewed and are listed in nursing record and/or below  Prior to Admission medications    Medication Sig Start Date End Date Taking?  Authorizing Provider   insulin glargine (LANTUS) 100 UNIT/ML injection vial Inject 35 Units into the skin 2 times daily 4/9/19  Yes Silvana Barrow MD   insulin lispro (HUMALOG KWIKPEN) 100 UNIT/ML pen Inject 15 Units into the skin 3 times daily (before meals) 4/9/19  Yes Elsie Etienne MD   Insulin Pen Needle (B-D ULTRAFINE III SHORT PEN) 31G X 8 MM MISC Five shots a day 4/9/19  Yes Elsie Etienne MD   metolazone (ZAROXOLYN) 5 MG tablet TAKE 1 TABLET BY MOUTH EVERY DAY 4/3/19  Yes TRAY Garber CNP   digoxin (LANOXIN) 125 MCG tablet Take 1 tablet by mouth daily 4/3/19  Yes TRAY Call CNP   spironolactone (ALDACTONE) 100 MG tablet Take 1 tablet by mouth 2 times daily 3/13/19  Yes TRAY Cash CNP   albuterol sulfate HFA (VENTOLIN HFA) 108 (90 Base) MCG/ACT inhaler Inhale 2 puffs into the lungs every 6 hours as needed for Wheezing or Shortness of Breath 3/6/19  Yes Helen Da Silva MD   sacubitril-valsartan (ENTRESTO) 24-26 MG per tablet Take 0.5 tablets by mouth every evening 2/25/19  Yes TRAY Garber CNP   potassium chloride (KLOR-CON M) 20 MEQ extended release tablet Take 2 tablets by mouth 3 times daily 2/6/19  Yes TRAY Garber CNP   clopidogrel (PLAVIX) 75 MG tablet Take 1 tablet by mouth daily 1/31/19  Yes TRAY Garber CNP   nystatin (MYCOSTATIN) 452253 UNIT/ML suspension Take 5 mLs by mouth 4 times daily 1/9/19  Yes TRAY Garber CNP   torsemide (DEMADEX) 100 MG tablet Take 0.5 tablets by mouth daily  Patient taking differently: Take 100 mg by mouth daily Indications: 1 tab po daily  1/9/19  Yes TRAY Garber CNP   tiotropium (SPIRIVA RESPIMAT) 2.5 MCG/ACT AERS inhaler Inhale 2 puffs into the lungs daily 12/4/18  Yes Helen Da Silva MD   carvedilol (COREG) 3.125 MG tablet Take 1 tablet by mouth 2 times daily (with meals) 11/28/18  Yes TRAY Garber CNP   magnesium oxide (MAG-OX) 400 MG tablet Take 1 tablet by mouth daily 11/15/18  Yes TRAY Salgado CNP   albuterol (PROVENTIL) (2.5 MG/3ML) 0.083% nebulizer solution Inhale 3 mLs into the lungs 7/5/18  Yes Historical Provider, MD   atorvastatin (LIPITOR) 80 MG tablet Take 80 mg by mouth nightly 5/2/18  Yes Historical Provider, MD   busPIRone (BUSPAR) 30 MG tablet Take 30 mg by mouth 3 times daily 4/2/18  Yes Historical Provider, MD   metFORMIN (GLUCOPHAGE-XR) 500 MG extended release tablet Take 1,000 mg by mouth 2 times daily 4/2/18  Yes Historical Provider, MD   buprenorphine-naloxone (SUBOXONE) 8-2 MG SUBL SL tablet Place 2 tablets under the tongue daily. .   Yes Historical Provider, MD   doxepin (SINEQUAN) 50 MG capsule Take 50 mg by mouth nightly   Yes Historical Provider, MD   nitroGLYCERIN (NITROSTAT) 0.4 MG SL tablet Place 0.4 mg under the tongue every 5 minutes as needed for Chest pain (times 3) up to max of 3 total doses. If no relief after 1 dose, call 911. Yes Historical Provider, MD   aspirin EC 81 MG EC tablet Take 1 tablet by mouth daily 1/8/16  Yes Nancy Butcher MD   pantoprazole (PROTONIX) 40 MG tablet Take 1 tablet by mouth daily  Patient taking differently: Take 40 mg by mouth 2 times daily  12/11/15  Yes Nguyen Oliva MD   ARIPiprazole (ABILIFY) 10 MG tablet Take 5 mg by mouth daily    Yes Historical Provider, MD   lamoTRIgine (LAMICTAL) 150 MG tablet Take 150 mg by mouth 2 times daily    Yes Historical Provider, MD          Allergies:  Lisinopril     Review of Systems:   A 14 point review of symptoms completed. Pertinent positives identified in the HPI, all other review of symptoms negative as below.       Objective   PHYSICAL EXAM:    Vitals:    04/26/19 1508   BP: (!) 102/58   Pulse: 96   SpO2: 96%    Weight: 224 lb (101.6 kg)         General Appearance:  Alert, cooperative, no distress, appears stated age   Head:  Normocephalic, without obvious abnormality, atraumatic   Eyes:  PERRL, conjunctiva/corneas clear   Nose: Nares normal, no drainage or sinus tenderness   Throat: Lips, mucosa, and tongue normal   Neck: Supple, symmetrical, trachea midline, no adenopathy, thyroid: not enlarged, symmetric, no tenderness/mass/nodules, no carotid bruit or JVD   Lungs:   Clear to auscultation bilaterally, respirations unlabored   Chest Wall:  No deformity or tenderness   Heart:  Regular rate and rhythm, S1, S2 normal, 2/6 sm   Abdomen:   Soft, non-tender, bowel sounds active all four quadrants,  no masses, no organomegaly   Extremities: Extremities atraumatic, no cyanosis or edema. Both feet reddish and slow to fill. Pulses: Faint bilat dp present   Skin: Skin color notable for dependen rubor in bilat feet   Pysch: Normal mood and affect   Neurologic: Normal gross motor and sensory exam.         Labs   CBC:   Lab Results   Component Value Date    WBC 11.5 04/19/2019    RBC 4.66 04/19/2019    HGB 14.0 04/19/2019    HCT 41.4 04/19/2019    MCV 88.8 04/19/2019    RDW 14.0 04/19/2019     04/19/2019     CMP:  Lab Results   Component Value Date     04/19/2019    K 3.5 04/19/2019    CL 86 04/19/2019    CO2 37 04/19/2019    BUN 33 04/19/2019    CREATININE 1.2 04/23/2019    GFRAA 56 04/23/2019    AGRATIO 1.0 04/19/2019    LABGLOM 47 04/23/2019    GLUCOSE 250 04/19/2019    PROT 7.5 04/19/2019    CALCIUM 11.0 04/19/2019    BILITOT <0.2 04/19/2019    ALKPHOS 110 04/19/2019    AST 10 04/19/2019    ALT 9 04/19/2019     PT/INR:  No results found for: PTINR  HgBA1c:  Lab Results   Component Value Date    LABA1C 11.7 11/28/2018     Lab Results   Component Value Date    TROPONINI <0.01 04/19/2019         Cardiac Data     Last EKG:   nsr lt axis rbbb anter infarct    Echo: 04/03/19  Summary   LV systolic function is mildly reduced with EF estimated at 40-45%. There is severe HK of the apex and apical-septal segment. Normal left ventricular diastolic filling pressure. Mild mitral regurgitation. Systolic pulmonary artery pressure (SPAP) estimated at 32mmHg (RA pressure   3mmHg). URI: 05/25/18   Ejection fraction is visually estimated to be 30-35 %.    There is akinesis of the apex and inferior with antegrade flow bilaterally. BLE arterial dopper: 04/18/19     1. There is severe arterial insufficiency at rest involving the right lower  extremity. There occlusive disease of the right proximal superficial femoral  (noted stent) and mid posterior tibial arteries. There is a 50-74% stenosis  at 4the right deep femoral artery with evidence of inflow disease. 2. There is severe arterial insufficiency at rest involving the left lower  extremity. There is occlusive disease of the left proximal superficial  femoral artery (noted stent). There is a 30-49% stenosis at the left deep  femoral artery with evidence of inflow disease. 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 There is at least mild stenosis of the celiac artery and bilateral renal arteries. A moderate amount of atherosclerotic plaque is seen within the infrarenal abdominal aorta and both iliac arteries causing mild stenosis. Severe outflow disease. There is complete occlusion of the right SFA stent along its entire course with reconstitution distally. There is complete occlusion of the left proximal short SFA stent and the remainder of the left SFA with reconstitution distally. Runoff disease bilaterally. There is single vessel runoff to the right foot and 2 vessel runoff to the left foot. Mild pneumobilia which may be from recent procedure. Clinical correlation recommended. Studies:       I have reviewed labs and imaging/xray/diagnostic testing in this note.     Assessment        Patient Active Problem List   Diagnosis    DM2/IDDM    HTN (hypertension)    CLINT (obstructive sleep apnea)    Tobacco smoker    PVD (peripheral vascular disease) with claudication (HCC)    Hypotension    Volume depletion    GERD (gastroesophageal reflux disease)    Hyperlipidemia    Anxiety and depression    Presyncope    Hypochloremia [place]  [phone] [facility]  [phone] [address] [place] [address]  4/26/2019 3:27 PM    I will address the patient's cardiac risk factors and adjusted pharmacologic treatment as needed. In addition, I have reinforced the need for patient directed risk factor modification. Tobacco use was discussed with the patient and educated on the negative effects and was asked not to use. All questions and concerns were addressed to the patient/family. Alternatives to my treatment were discussed. I, Dr Rosendo Schofield, personally performed the services described in this documentation, as scribed by the above signed scribe in my presence. It is both accurate and complete to my knowledge. I agree with the details independently gathered by the clinical support staff and the scribed note accurately describes my personal service to the patient.

## 2019-04-29 ENCOUNTER — TELEPHONE (OUTPATIENT)
Dept: CARDIOLOGY | Age: 54
End: 2019-04-29

## 2019-04-29 DIAGNOSIS — K83.8 PNEUMOBILIA: Primary | ICD-10-CM

## 2019-04-29 NOTE — TELEPHONE ENCOUNTER
Spoke with Batsheva Lagos and advised her that she needs to have her blood drawn and schedule CT with oral and IV contrast. Advised her that we will be sending a general surgery referral. She verbalized understanding and agreed with the plan.

## 2019-05-01 RX ORDER — LANOLIN ALCOHOL/MO/W.PET/CERES
CREAM (GRAM) TOPICAL
Qty: 30 TABLET | Refills: 11 | Status: ON HOLD | OUTPATIENT
Start: 2019-05-01 | End: 2021-01-01 | Stop reason: HOSPADM

## 2019-05-02 ENCOUNTER — TELEPHONE (OUTPATIENT)
Dept: CARDIOLOGY CLINIC | Age: 54
End: 2019-05-02

## 2019-05-02 ENCOUNTER — HOSPITAL ENCOUNTER (OUTPATIENT)
Age: 54
Discharge: HOME | End: 2019-05-02
Payer: COMMERCIAL

## 2019-05-02 ENCOUNTER — TELEPHONE (OUTPATIENT)
Dept: CARDIOLOGY | Age: 54
End: 2019-05-02

## 2019-05-02 ENCOUNTER — OFFICE VISIT (OUTPATIENT)
Dept: PULMONOLOGY | Age: 54
End: 2019-05-02
Payer: COMMERCIAL

## 2019-05-02 VITALS
RESPIRATION RATE: 22 BRPM | OXYGEN SATURATION: 97 % | DIASTOLIC BLOOD PRESSURE: 60 MMHG | WEIGHT: 221.6 LBS | SYSTOLIC BLOOD PRESSURE: 112 MMHG | BODY MASS INDEX: 36.92 KG/M2 | HEIGHT: 65 IN | HEART RATE: 92 BPM

## 2019-05-02 DIAGNOSIS — Z87.891 PERSONAL HISTORY OF SMOKING: ICD-10-CM

## 2019-05-02 DIAGNOSIS — R91.8 PULMONARY NODULES: ICD-10-CM

## 2019-05-02 DIAGNOSIS — I50.9 CONGESTIVE HEART FAILURE, UNSPECIFIED HF CHRONICITY, UNSPECIFIED HEART FAILURE TYPE (HCC): ICD-10-CM

## 2019-05-02 DIAGNOSIS — K83.8 PNEUMOBILIA: ICD-10-CM

## 2019-05-02 DIAGNOSIS — J98.4 RESTRICTIVE LUNG DISEASE: ICD-10-CM

## 2019-05-02 DIAGNOSIS — G47.33 MODERATE OBSTRUCTIVE SLEEP APNEA: Primary | ICD-10-CM

## 2019-05-02 LAB
ALBUMIN SERPL-MCNC: 4 G/DL (ref 3.4–5)
ALP BLD-CCNC: 106 U/L (ref 40–129)
ALT SERPL-CCNC: 9 U/L (ref 10–40)
AMYLASE: 49 U/L (ref 25–115)
ANION GAP SERPL CALCULATED.3IONS-SCNC: 14 MMOL/L (ref 3–16)
AST SERPL-CCNC: 10 U/L (ref 15–37)
BILIRUB SERPL-MCNC: <0.2 MG/DL (ref 0–1)
BILIRUBIN DIRECT: <0.2 MG/DL (ref 0–0.3)
BILIRUBIN, INDIRECT: ABNORMAL MG/DL (ref 0–1)
BUN BLDV-MCNC: 45 MG/DL (ref 7–20)
CALCIUM SERPL-MCNC: 9.9 MG/DL (ref 8.3–10.6)
CHLORIDE BLD-SCNC: 85 MMOL/L (ref 99–110)
CO2: 32 MMOL/L (ref 21–32)
CREAT SERPL-MCNC: 1.5 MG/DL (ref 0.6–1.1)
GFR AFRICAN AMERICAN: 44
GFR NON-AFRICAN AMERICAN: 36
GLUCOSE BLD-MCNC: 138 MG/DL (ref 70–99)
LIPASE: 32 U/L (ref 13–60)
POTASSIUM SERPL-SCNC: 3.8 MMOL/L (ref 3.5–5.1)
PRO-BNP: 190 PG/ML (ref 0–124)
SODIUM BLD-SCNC: 131 MMOL/L (ref 136–145)
TOTAL PROTEIN: 7.6 G/DL (ref 6.4–8.2)

## 2019-05-02 PROCEDURE — 80048 BASIC METABOLIC PNL TOTAL CA: CPT

## 2019-05-02 PROCEDURE — 36415 COLL VENOUS BLD VENIPUNCTURE: CPT

## 2019-05-02 PROCEDURE — G8598 ASA/ANTIPLAT THER USED: HCPCS | Performed by: INTERNAL MEDICINE

## 2019-05-02 PROCEDURE — 83880 ASSAY OF NATRIURETIC PEPTIDE: CPT

## 2019-05-02 PROCEDURE — 4004F PT TOBACCO SCREEN RCVD TLK: CPT | Performed by: INTERNAL MEDICINE

## 2019-05-02 PROCEDURE — G8428 CUR MEDS NOT DOCUMENT: HCPCS | Performed by: INTERNAL MEDICINE

## 2019-05-02 PROCEDURE — 1111F DSCHRG MED/CURRENT MED MERGE: CPT | Performed by: INTERNAL MEDICINE

## 2019-05-02 PROCEDURE — 3017F COLORECTAL CA SCREEN DOC REV: CPT | Performed by: INTERNAL MEDICINE

## 2019-05-02 PROCEDURE — 80076 HEPATIC FUNCTION PANEL: CPT

## 2019-05-02 PROCEDURE — 83690 ASSAY OF LIPASE: CPT

## 2019-05-02 PROCEDURE — 99214 OFFICE O/P EST MOD 30 MIN: CPT | Performed by: INTERNAL MEDICINE

## 2019-05-02 PROCEDURE — 82150 ASSAY OF AMYLASE: CPT

## 2019-05-02 PROCEDURE — G8417 CALC BMI ABV UP PARAM F/U: HCPCS | Performed by: INTERNAL MEDICINE

## 2019-05-02 RX ORDER — TORSEMIDE 100 MG/1
50 TABLET ORAL DAILY
Qty: 30 TABLET | Refills: 3 | Status: SHIPPED
Start: 2019-05-02 | End: 2019-06-28 | Stop reason: DRUGHIGH

## 2019-05-02 ASSESSMENT — SLEEP AND FATIGUE QUESTIONNAIRES: NECK CIRCUMFERENCE (INCHES): 15.5

## 2019-05-02 NOTE — PATIENT INSTRUCTIONS
Tips to Help You Stop Smoking (taken from Up-To-Date)      Cigarette smoking is a preventable cause of death in the [place]. Quitting smoking now can decrease your risk of getting smoking-related illnesses like heart disease, stroke, cancer & COPD. S = Set a quit date. T = Tell family, friends, and the people around you that you plan to quit. A = Anticipate or plan ahead for the tough times you'll face while quitting. R = Remove cigarettes and other tobacco products from your home, car, and work. T = Talk to your doctor about getting help to quit. Your doctor may give you medicines to reduce your craving for cigarettes & the unpleasant symptoms that happen when you stop smoking (called withdrawal symptoms). What are the symptoms of withdrawal? -- The symptoms include:   ?Trouble sleeping   ? Being irritable, anxious or restless   ? Getting frustrated or angry   ? Having trouble thinking clearly  Nicotine replacement therapy eases withdrawal and reduces your bodys craving for nicotine, the main drug found in cigarettes. Non-prescription forms of nicotine replacement include skin patches, lozenges, and gum. Two prescription medications are available that have been proven to help people stop smoking:  ? Bupropion is a prescription medicine that reduces your desire to smoke. This medicine is sold under the brand names Zyban and Wellbutrin & as a generic formulation. ? Varenicline (brand name: Chantix) is a prescription medicine that reduces withdrawal symptoms and cigarette cravings. If you take bupropion or varenicline and you have any new or worsening depressed mood or thoughts of harming yourself or someone else, stop taking the medicine and call your doctor. Buproprion should not be taken by anyone with a history of seizure or epilepsy. Will I gain weight if I quit? -- Yes, you might gain a few pounds.  But quitting smoking will have a much more positive effect on your health than weighing a few pounds more. Plus, you can help prevent some weight gain by being more active and eating less. Taking the medicine bupropion might help control weight gain. What else can I do to improve my chances of quitting? -- You can:  ?Start exercising. ?Stay away from smokers and places that you associate with smoking. If people close to you smoke, ask them to quit with you. ? Keep gum, hard candy, or something to put in your mouth handy. If you get a craving for a cigarette, try one of these instead. ?Dont give up, even if you start smoking again. It takes most people a few tries before they succeed. You can also get help from a free phone line (8-530-QUIT-NOW) or go online to www.smokefree.gov. Your pulmonary team is here to help you quit.   Call for assistance [phone]

## 2019-05-02 NOTE — TELEPHONE ENCOUNTER
----- Message from TRAY Valentin CNP sent at 5/2/2019  9:42 AM EDT -----  She is starting to look a little \"dry\" by labs and BNP \"heart failure number\" much improved. CardioMEMs reviewed, PAD last 3 days running 25-28 which is good for her. Have her cut back on the torsemide to 50 mg (half tablet) daily. Repeat labs in 2 weeks per standing order.    Thanks, [employer]

## 2019-05-02 NOTE — TELEPHONE ENCOUNTER
Patient's test is scheduled for Mon 5/6. Will wait for results before scheduling procedure with Dr. Kate Faria.

## 2019-05-02 NOTE — PROGRESS NOTES
TOBACCO:   reports that she has been smoking cigarettes. She has a 15.00 pack-year smoking history. She has never used smokeless tobacco.  ETOH:   reports that she does not drink alcohol.       Family History:       Problem Relation Age of Onset    Heart Disease Maternal Grandmother     Cancer Mother         lung and brain cancer    Cancer Maternal Aunt         lung and brain cancer       Current Medications:    Current Outpatient Medications:     magnesium oxide (MAG-OX) 400 (240 Mg) MG tablet, TAKE 1 TABLET ONCE DAILY, Disp: 30 tablet, Rfl: 11    insulin glargine (LANTUS) 100 UNIT/ML injection vial, Inject 35 Units into the skin 2 times daily, Disp: 3 vial, Rfl: 3    insulin lispro (HUMALOG KWIKPEN) 100 UNIT/ML pen, Inject 15 Units into the skin 3 times daily (before meals), Disp: 5 pen, Rfl: 3    Insulin Pen Needle (B-D ULTRAFINE III SHORT PEN) 31G X 8 MM MISC, Five shots a day, Disp: 200 each, Rfl: 2    metolazone (ZAROXOLYN) 5 MG tablet, TAKE 1 TABLET BY MOUTH EVERY DAY, Disp: 30 tablet, Rfl: 1    digoxin (LANOXIN) 125 MCG tablet, Take 1 tablet by mouth daily, Disp: 30 tablet, Rfl: 3    spironolactone (ALDACTONE) 100 MG tablet, Take 1 tablet by mouth 2 times daily, Disp: 60 tablet, Rfl: 5    albuterol sulfate HFA (VENTOLIN HFA) 108 (90 Base) MCG/ACT inhaler, Inhale 2 puffs into the lungs every 6 hours as needed for Wheezing or Shortness of Breath, Disp: 1 Inhaler, Rfl: 6    sacubitril-valsartan (ENTRESTO) 24-26 MG per tablet, Take 0.5 tablets by mouth every evening, Disp: 60 tablet, Rfl: 0    potassium chloride (KLOR-CON M) 20 MEQ extended release tablet, Take 2 tablets by mouth 3 times daily, Disp: 180 tablet, Rfl: 3    clopidogrel (PLAVIX) 75 MG tablet, Take 1 tablet by mouth daily, Disp: 30 tablet, Rfl: 5    nystatin (MYCOSTATIN) 782597 UNIT/ML suspension, Take 5 mLs by mouth 4 times daily, Disp: 1 Bottle, Rfl: 0    torsemide (DEMADEX) 100 MG tablet, Take 0.5 tablets by mouth daily (Patient taking differently: Take 100 mg by mouth daily Indications: 1 tab po daily ), Disp: 30 tablet, Rfl: 3    tiotropium (SPIRIVA RESPIMAT) 2.5 MCG/ACT AERS inhaler, Inhale 2 puffs into the lungs daily, Disp: 1 Inhaler, Rfl: 6    carvedilol (COREG) 3.125 MG tablet, Take 1 tablet by mouth 2 times daily (with meals), Disp: 60 tablet, Rfl: 5    magnesium oxide (MAG-OX) 400 MG tablet, Take 1 tablet by mouth daily, Disp: 30 tablet, Rfl: 3    albuterol (PROVENTIL) (2.5 MG/3ML) 0.083% nebulizer solution, Inhale 3 mLs into the lungs, Disp: , Rfl:     atorvastatin (LIPITOR) 80 MG tablet, Take 80 mg by mouth nightly, Disp: , Rfl:     busPIRone (BUSPAR) 30 MG tablet, Take 30 mg by mouth 3 times daily, Disp: , Rfl:     metFORMIN (GLUCOPHAGE-XR) 500 MG extended release tablet, Take 1,000 mg by mouth 2 times daily, Disp: , Rfl:     buprenorphine-naloxone (SUBOXONE) 8-2 MG SUBL SL tablet, Place 2 tablets under the tongue daily. ., Disp: , Rfl:     doxepin (SINEQUAN) 50 MG capsule, Take 50 mg by mouth nightly, Disp: , Rfl:     nitroGLYCERIN (NITROSTAT) 0.4 MG SL tablet, Place 0.4 mg under the tongue every 5 minutes as needed for Chest pain (times 3) up to max of 3 total doses. If no relief after 1 dose, call 911., Disp: , Rfl:     aspirin EC 81 MG EC tablet, Take 1 tablet by mouth daily, Disp: 30 tablet, Rfl: 3    pantoprazole (PROTONIX) 40 MG tablet, Take 1 tablet by mouth daily (Patient taking differently: Take 40 mg by mouth 2 times daily ), Disp: 30 tablet, Rfl: 0    ARIPiprazole (ABILIFY) 10 MG tablet, Take 5 mg by mouth daily , Disp: , Rfl:     lamoTRIgine (LAMICTAL) 150 MG tablet, Take 150 mg by mouth 2 times daily , Disp: , Rfl:         Objective:   PHYSICAL EXAM:    Blood pressure 112/60, pulse 92, resp. rate 22, height 5' 4.5\" (1.638 m), weight 221 lb 9.6 oz (100.5 kg), last menstrual period 10/24/2012, SpO2 97 %, not currently breastfeeding.' on RA  Gen: No distress. Obese. BMI of 37.45  Eyes: PERRL.  No sclera

## 2019-05-06 ENCOUNTER — TELEPHONE (OUTPATIENT)
Dept: CARDIOLOGY CLINIC | Age: 54
End: 2019-05-06

## 2019-05-06 ENCOUNTER — HOSPITAL ENCOUNTER (OUTPATIENT)
Dept: CT IMAGING | Age: 54
Discharge: HOME | End: 2019-05-06
Payer: COMMERCIAL

## 2019-05-06 DIAGNOSIS — K83.8 PNEUMOBILIA: ICD-10-CM

## 2019-05-06 PROCEDURE — 74176 CT ABD & PELVIS W/O CONTRAST: CPT

## 2019-05-06 NOTE — TELEPHONE ENCOUNTER
Spoke with pt, confirmed appt 5/8. Pt knows there is a 4 pm available today, she may call later to see if it is available.

## 2019-05-06 NOTE — TELEPHONE ENCOUNTER
Spoke with pt. Relayed message per [facility]. [facility] has an opening at 4 today, pt can not come at that time. She would like appt 11:45 on 5/8. I am checking with Ruby Ledbetter to see if this is ok. I told pt we can contact her to verify confirmatio of appt.

## 2019-05-06 NOTE — TELEPHONE ENCOUNTER
Patient called complaining of bilateral lower extremeity pain that radiates up into her back when she walks or stands up, she is can barely walk to the restroom today. Patient always has a burning pain but this pain that she is experiencing today is completely different. Bilateral lower extremity edema.   Weight:   5/6/19 233.1 lbs  5/5/19 225.2 lbs    Torsemide 50mg daily   Metolazone 5mg daily

## 2019-05-06 NOTE — TELEPHONE ENCOUNTER
----- Message from Rabia Camacho MD sent at 5/4/2019 12:44 PM EDT -----  Let her know that I reviewed her scans further with my partners and with our vascular surgeon. Consensus is to consider surgery for bypass. I think it would be good to add (overbook) onto schedule this week to discuss further.   thanks

## 2019-05-06 NOTE — TELEPHONE ENCOUNTER
Lets have her stop both of diuretics, let her know I am concerned about progression of kidney disease and LE arterial insufficiency. If she is not feeling better on 5/7/19, I think she should go to ER.

## 2019-05-07 ENCOUNTER — TELEPHONE (OUTPATIENT)
Dept: OTHER | Facility: CLINIC | Age: 54
End: 2019-05-07

## 2019-05-07 ENCOUNTER — TELEPHONE (OUTPATIENT)
Dept: CARDIOLOGY CLINIC | Age: 54
End: 2019-05-07

## 2019-05-07 ENCOUNTER — APPOINTMENT (OUTPATIENT)
Dept: GENERAL RADIOLOGY | Age: 54
End: 2019-05-07
Payer: COMMERCIAL

## 2019-05-07 ENCOUNTER — HOSPITAL ENCOUNTER (EMERGENCY)
Age: 54
Discharge: HOME | End: 2019-05-07
Attending: EMERGENCY MEDICINE
Payer: COMMERCIAL

## 2019-05-07 VITALS
RESPIRATION RATE: 20 BRPM | HEART RATE: 92 BPM | BODY MASS INDEX: 39.78 KG/M2 | TEMPERATURE: 98.2 F | DIASTOLIC BLOOD PRESSURE: 67 MMHG | OXYGEN SATURATION: 96 % | SYSTOLIC BLOOD PRESSURE: 105 MMHG | HEIGHT: 64 IN | WEIGHT: 233 LBS

## 2019-05-07 DIAGNOSIS — E11.9 INSULIN DEPENDENT TYPE 2 DIABETES MELLITUS (HCC): ICD-10-CM

## 2019-05-07 DIAGNOSIS — R63.5 WEIGHT GAIN: Primary | ICD-10-CM

## 2019-05-07 DIAGNOSIS — Z87.891 HISTORY OF TOBACCO ABUSE: ICD-10-CM

## 2019-05-07 DIAGNOSIS — M79.89 LEG SWELLING: ICD-10-CM

## 2019-05-07 DIAGNOSIS — I27.20 PULMONARY HYPERTENSION (HCC): ICD-10-CM

## 2019-05-07 DIAGNOSIS — R60.0 LEG EDEMA: ICD-10-CM

## 2019-05-07 DIAGNOSIS — Z79.4 INSULIN DEPENDENT TYPE 2 DIABETES MELLITUS (HCC): ICD-10-CM

## 2019-05-07 DIAGNOSIS — R60.0 BILATERAL LEG EDEMA: ICD-10-CM

## 2019-05-07 DIAGNOSIS — Z79.01 ANTICOAGULATED BY ANTICOAGULATION TREATMENT: ICD-10-CM

## 2019-05-07 DIAGNOSIS — I73.9 PERIPHERAL VASCULAR DISEASE (HCC): ICD-10-CM

## 2019-05-07 DIAGNOSIS — I42.9 CARDIOMYOPATHY, UNSPECIFIED TYPE (HCC): ICD-10-CM

## 2019-05-07 LAB
A/G RATIO: 1.1 (ref 1.1–2.2)
ALBUMIN SERPL-MCNC: 3.6 G/DL (ref 3.4–5)
ALP BLD-CCNC: 96 U/L (ref 40–129)
ALT SERPL-CCNC: 9 U/L (ref 10–40)
ANION GAP SERPL CALCULATED.3IONS-SCNC: 11 MMOL/L (ref 3–16)
AST SERPL-CCNC: 11 U/L (ref 15–37)
BASOPHILS ABSOLUTE: 0.1 K/UL (ref 0–0.2)
BASOPHILS RELATIVE PERCENT: 0.7 %
BILIRUB SERPL-MCNC: 0.3 MG/DL (ref 0–1)
BUN BLDV-MCNC: 23 MG/DL (ref 7–20)
CALCIUM SERPL-MCNC: 9.5 MG/DL (ref 8.3–10.6)
CHLORIDE BLD-SCNC: 94 MMOL/L (ref 99–110)
CO2: 33 MMOL/L (ref 21–32)
CREAT SERPL-MCNC: 1.2 MG/DL (ref 0.6–1.1)
EKG ATRIAL RATE: 96 BPM
EKG DIAGNOSIS: NORMAL
EKG P AXIS: 64 DEGREES
EKG P-R INTERVAL: 200 MS
EKG Q-T INTERVAL: 368 MS
EKG QRS DURATION: 142 MS
EKG QTC CALCULATION (BAZETT): 464 MS
EKG R AXIS: -68 DEGREES
EKG T AXIS: 61 DEGREES
EKG VENTRICULAR RATE: 96 BPM
EOSINOPHILS ABSOLUTE: 0.2 K/UL (ref 0–0.6)
EOSINOPHILS RELATIVE PERCENT: 2.2 %
GFR AFRICAN AMERICAN: 56
GFR NON-AFRICAN AMERICAN: 47
GLOBULIN: 3.3 G/DL
GLUCOSE BLD-MCNC: 113 MG/DL (ref 70–99)
HCT VFR BLD CALC: 40.6 % (ref 36–48)
HEMOGLOBIN: 13.6 G/DL (ref 12–16)
LYMPHOCYTES ABSOLUTE: 1.7 K/UL (ref 1–5.1)
LYMPHOCYTES RELATIVE PERCENT: 15.9 %
MCH RBC QN AUTO: 30.3 PG (ref 26–34)
MCHC RBC AUTO-ENTMCNC: 33.6 G/DL (ref 31–36)
MCV RBC AUTO: 90.2 FL (ref 80–100)
MONOCYTES ABSOLUTE: 0.6 K/UL (ref 0–1.3)
MONOCYTES RELATIVE PERCENT: 6 %
NEUTROPHILS ABSOLUTE: 8 K/UL (ref 1.7–7.7)
NEUTROPHILS RELATIVE PERCENT: 75.2 %
PDW BLD-RTO: 14.6 % (ref 12.4–15.4)
PLATELET # BLD: 355 K/UL (ref 135–450)
PMV BLD AUTO: 8.9 FL (ref 5–10.5)
POTASSIUM SERPL-SCNC: 3.7 MMOL/L (ref 3.5–5.1)
PRO-BNP: 1384 PG/ML (ref 0–124)
RBC # BLD: 4.5 M/UL (ref 4–5.2)
SODIUM BLD-SCNC: 138 MMOL/L (ref 136–145)
TOTAL PROTEIN: 6.9 G/DL (ref 6.4–8.2)
WBC # BLD: 10.7 K/UL (ref 4–11)

## 2019-05-07 PROCEDURE — 93010 ELECTROCARDIOGRAM REPORT: CPT | Performed by: INTERNAL MEDICINE

## 2019-05-07 PROCEDURE — 36415 COLL VENOUS BLD VENIPUNCTURE: CPT

## 2019-05-07 PROCEDURE — 83880 ASSAY OF NATRIURETIC PEPTIDE: CPT

## 2019-05-07 PROCEDURE — 85025 COMPLETE CBC W/AUTO DIFF WBC: CPT

## 2019-05-07 PROCEDURE — 93005 ELECTROCARDIOGRAM TRACING: CPT | Performed by: EMERGENCY MEDICINE

## 2019-05-07 PROCEDURE — 99284 EMERGENCY DEPT VISIT MOD MDM: CPT

## 2019-05-07 PROCEDURE — 80053 COMPREHEN METABOLIC PANEL: CPT

## 2019-05-07 PROCEDURE — 71045 X-RAY EXAM CHEST 1 VIEW: CPT

## 2019-05-07 ASSESSMENT — PAIN DESCRIPTION - ORIENTATION: ORIENTATION: RIGHT;LEFT

## 2019-05-07 ASSESSMENT — PAIN DESCRIPTION - DESCRIPTORS: DESCRIPTORS: CONSTANT;PRESSURE

## 2019-05-07 ASSESSMENT — ENCOUNTER SYMPTOMS
SINUS PAIN: 0
ABDOMINAL DISTENTION: 0
ABDOMINAL PAIN: 0
BACK PAIN: 0
SHORTNESS OF BREATH: 0
COUGH: 0
CHEST TIGHTNESS: 0
WHEEZING: 0
STRIDOR: 0
CHOKING: 0
SINUS PRESSURE: 0

## 2019-05-07 ASSESSMENT — PAIN DESCRIPTION - LOCATION: LOCATION: BACK;LEG

## 2019-05-07 ASSESSMENT — PAIN DESCRIPTION - FREQUENCY: FREQUENCY: CONTINUOUS

## 2019-05-07 ASSESSMENT — PAIN SCALES - GENERAL: PAINLEVEL_OUTOF10: 5

## 2019-05-07 ASSESSMENT — PAIN DESCRIPTION - PAIN TYPE: TYPE: ACUTE PAIN

## 2019-05-07 NOTE — ED NOTES
RN rounded on pt. Pt on monitor, VSS and updated and pt updated on POC. Pt has no further needs at this time. Pt resting in bed, call light within reach. Pt denies any questions. Will cont to monitor.       Hussein Bauman RN  05/07/19 9527

## 2019-05-07 NOTE — TELEPHONE ENCOUNTER
----- Message from Rabia Camacho MD sent at 5/6/2019  4:14 PM EDT -----  Let her know ct abd test shows stable findings around liver, and per previous messages, let her know I reviewed her scans with my colleagues and our vascular surgeons. Consensus is for bypass surgery rathan than cath/angio/stents for legs. I know she was interested in a second opinion on this and I think it would be best for her to come in this week to discuss it. Also, lets make sure she saw general surgery for the \"stable pneumobilia\" findings on ct scans done recently. Thanks.

## 2019-05-07 NOTE — ED PROVIDER NOTES
for dizziness, seizures, speech difficulty, light-headedness, numbness and headaches. Psychiatric/Behavioral: Negative for behavioral problems. All other systems reviewed and are negative. Patient Vitals for the past 24 hrs:   BP Temp Temp src Pulse Resp SpO2 Height Weight   05/07/19 1457 107/69 98.2 °F (36.8 °C) Oral 94 18 96 % 5' 4\" (1.626 m) 233 lb (105.7 kg)         Physical Exam   Constitutional: She is oriented to person, place, and time. She appears well-developed and well-nourished. She is cooperative. She is easily aroused. Non-toxic appearance. She does not have a sickly appearance. She does not appear ill. No distress. HENT:   Head: Normocephalic and atraumatic. Eyes: Pupils are equal, round, and reactive to light. Conjunctivae and EOM are normal.   Neck: Normal range of motion. Neck supple. No thyromegaly present. Cardiovascular: Normal rate, regular rhythm, normal heart sounds and intact distal pulses. Exam reveals no gallop and no friction rub. No murmur heard. Pulmonary/Chest: Effort normal and breath sounds normal. No respiratory distress. Abdominal: Soft. Bowel sounds are normal. She exhibits no distension. There is no tenderness. Musculoskeletal:        Right forearm: She exhibits edema. Left forearm: She exhibits edema. Right lower leg: She exhibits swelling and edema. Left lower leg: She exhibits swelling and edema. Neurological: She is alert, oriented to person, place, and time and easily aroused. She displays normal reflexes. No cranial nerve deficit or sensory deficit. She exhibits normal muscle tone. Coordination normal. GCS eye subscore is 4. GCS verbal subscore is 5. GCS motor subscore is 6. Skin: She is not diaphoretic. Psychiatric: She has a normal mood and affect. Her behavior is normal.   Nursing note and vitals reviewed. Procedures    MDM         Labs      Radiology      EKG Interpretation. EKG interpreted by myself. assistance of cardiology reveals a normal sinus rhythm she does have a left axis deviation and left bundle branch pattern which is unchanged from previous tracings        Past Medical History:   Diagnosis Date    Anxiety and depression     CAD (coronary artery disease) 01/2018    Cardiomyopathy ([facility]     CHF (congestive heart failure) (HCC)     COPD (chronic obstructive pulmonary disease) ([facility]     Diabetes mellitus ([facility] [address]     GERD (gastroesophageal reflux disease)     Hyperlipidemia     Hypertension     Peripheral neuropathy     [facility]     Pulmonary HTN [facility]     Reflux     Sleep apnea     has never done sleep study;does not use CPAP    Wears glasses     for reading       Past Surgical History:   Procedure Laterality Date    ARTERIAL BYPASS SURGRY Left 01/06/2016    Axillary/Subclavian to Brachial Bypass w/reversed SVG    CARDIAC CATHETERIZATION  03/27/2018    Dr. Lucinda Ward - at [facility]      pancreatitis post surgery    CORONARY ANGIOPLASTY WITH STENT PLACEMENT  03/16/2018    MELODY- 3.5 x 38 and 3.5 x 20 to Cx    CORONARY ANGIOPLASTY WITH STENT PLACEMENT  01/03/2018    MELODY- 3.0 x 38 and 2.75 x 26 and 2.75 x 8 and 2.75 x 22 to the LAD    INSERTABLE CARDIAC MONITOR  02/14/2019    CardioMEMs insertion for CHF    ROTATOR CUFF REPAIR Left 04/22/2016    TONSILLECTOMY      TUMOR EXCISION      benign tumors X 2 chest & axilla    UPPER GASTROINTESTINAL ENDOSCOPY  4/25/2013    BX barretts, HH, gastritis    UPPER GASTROINTESTINAL ENDOSCOPY  12/10/2015    UPPER GASTROINTESTINAL ENDOSCOPY  01/06/2017    Gastritis       Family History   Problem Relation Age of Onset    Heart Disease Maternal Grandmother     Cancer Mother         lung and brain cancer    Cancer Maternal Aunt         lung and brain cancer       Social History     Socioeconomic History    Marital status: Single     Spouse name: Not on file    Number of children: Not on file    Years of education: Not on file    Highest education level: Not on file   Occupational History    Not on file   Social Needs    Financial resource strain: Not on file    Food insecurity:     Worry: Not on file     Inability: Not on file    Transportation needs:     Medical: Not on file     Non-medical: Not on file   Tobacco Use    Smoking status: Current Every Day Smoker     Packs/day: 0.50     Years: 30.00     Pack years: 15.00     Types: Cigarettes    Smokeless tobacco: Never Used    Tobacco comment: 5/2/19 currently 1/2 PPD has quit date set for 5/12/19   Substance and Sexual Activity    Alcohol use: No    Drug use: No    Sexual activity: Yes     Partners: Male   Lifestyle    Physical activity:     Days per week: Not on file     Minutes per session: Not on file    Stress: Not on file   Relationships    Social connections:     Talks on phone: Not on file     Gets together: Not on file     Attends Protestant service: Not on file     Active member of club or organization: Not on file     Attends meetings of clubs or organizations: Not on file     Relationship status: Not on file    Intimate partner violence:     Fear of current or ex partner: Not on file     Emotionally abused: Not on file     Physically abused: Not on file     Forced sexual activity: Not on file   Other Topics Concern    Not on file   Social History Narrative    Not on file       Patient Vitals for the past 24 hrs:   BP Temp Temp src Pulse Resp SpO2 Height Weight   05/07/19 1726 105/67 -- -- 92 20 96 % -- --   05/07/19 1457 107/69 98.2 °F (36.8 °C) Oral 94 18 96 % 5' 4\" (1.626 m) 233 lb (105.7 kg)         Medications - No data to display    Results for orders placed or performed during the hospital encounter of 05/07/19   CBC Auto Differential   Result Value Ref Range    WBC 10.7 4.0 - 11.0 K/uL    RBC 4.50 4.00 - 5.20 M/uL    Hemoglobin 13.6 12.0 - 16.0 g/dL    Hematocrit 40.6 36.0 - 48.0 %    MCV 90.2 80.0 - 100.0 fL    MCH 30.3 26.0 - 34.0 pg    MCHC 33.6 31.0 - 36.0 g/dL    RDW 14.6 12.4 - 15.4 %    Platelets 258 602 - 305 K/uL    MPV 8.9 5.0 - 10.5 fL    Neutrophils % 75.2 %    Lymphocytes % 15.9 %    Monocytes % 6.0 %    Eosinophils % 2.2 %    Basophils % 0.7 %    Neutrophils # 8.0 (H) 1.7 - 7.7 K/uL    Lymphocytes # 1.7 1.0 - 5.1 K/uL    Monocytes # 0.6 0.0 - 1.3 K/uL    Eosinophils # 0.2 0.0 - 0.6 K/uL    Basophils # 0.1 0.0 - 0.2 K/uL   Comprehensive Metabolic Panel   Result Value Ref Range    Sodium 138 136 - 145 mmol/L    Potassium 3.7 3.5 - 5.1 mmol/L    Chloride 94 (L) 99 - 110 mmol/L    CO2 33 (H) 21 - 32 mmol/L    Anion Gap 11 3 - 16    Glucose 113 (H) 70 - 99 mg/dL    BUN 23 (H) 7 - 20 mg/dL    CREATININE 1.2 (H) 0.6 - 1.1 mg/dL    GFR Non- 47 (A) >60    GFR  56 (A) >60    Calcium 9.5 8.3 - 10.6 mg/dL    Total Protein 6.9 6.4 - 8.2 g/dL    Alb 3.6 3.4 - 5.0 g/dL    Albumin/Globulin Ratio 1.1 1.1 - 2.2    Total Bilirubin 0.3 0.0 - 1.0 mg/dL    Alkaline Phosphatase 96 40 - 129 U/L    ALT 9 (L) 10 - 40 U/L    AST 11 (L) 15 - 37 U/L    Globulin 3.3 g/dL   Brain Natriuretic Peptide   Result Value Ref Range    Pro-BNP 1,384 (H) 0 - 124 pg/mL   EKG 12 Lead   Result Value Ref Range    Ventricular Rate 96 BPM    Atrial Rate 96 BPM    P-R Interval 200 ms    QRS Duration 142 ms    Q-T Interval 368 ms    QTc Calculation (Bazett) 464 ms    P Axis 64 degrees    R Axis -68 degrees    T Axis 61 degrees    Diagnosis       Normal sinus rhythmLeft axis deviationRight bundle branch blockSeptal infarct (cited on or before 23-MAY-2018)Possible Lateral infarct , age undeterminedAbnormal ECGWhen compared with ECG of 19-APR-2019 15:21,No significant change was foundConfirmed by Manuel Duke MD, [address] ([id number]) on 5/7/2019 5:06:41 PM       Ct Abdomen Pelvis Wo Contrast Additional Contrast? Oral    Result Date: 5/6/2019  EXAMINATION: CT OF THE ABDOMEN AND PELVIS WITHOUT CONTRAST 5/6/2019 1:26 pm TECHNIQUE: CT of the abdomen and pelvis was performed without the administration of intravenous contrast. Multiplanar reformatted images are provided for review. Dose modulation, iterative reconstruction, and/or weight based adjustment of the mA/kV was utilized to reduce the radiation dose to as low as reasonably achievable. COMPARISON: 04/23/2019. HISTORY: ORDERING SYSTEM PROVIDED HISTORY: Pneumobilia TECHNOLOGIST PROVIDED HISTORY: 5/2/19 CR 1.5 GFR 36 Additional Contrast?->Oral Reason for exam:->pneumobilia Ordering Physician Provided Reason for Exam: PNEUMOBILIA Acuity: Acute Type of Exam: Initial FINDINGS: Lower Chest: There is no consolidation or effusion. Organs: There is mild pneumobilia present within the left lobe of the liver. The remainder of the solid abdominal organs are unremarkable. Postsurgical changes of hysterectomy are present. GI/Bowel: There is no bowel dilatation, wall thickening or obstruction. The appendix is normal. Pelvis: The pelvic organs and urinary bladder are unremarkable. Peritoneum/Retroperitoneum: There is no free air, free fluid or intraperitoneal inflammatory change. There is no adenopathy. Bones/Soft Tissues: There is no fracture or aggressive osseous lesion. Stable pneumobilia. Xr Chest Standard (2 Vw)    Result Date: 4/19/2019  EXAMINATION: TWO VIEWS OF THE CHEST 4/19/2019 4:31 pm COMPARISON: Chest 04/08/2019 HISTORY: ORDERING SYSTEM PROVIDED HISTORY: SOB TECHNOLOGIST PROVIDED HISTORY: Reason for exam:->SOB Ordering Physician Provided Reason for Exam: sob Acuity: Acute Type of Exam: Initial FINDINGS: Calcifications involving the aorta reflect atherosclerosis. The cardiomediastinal and hilar silhouettes appear otherwise unremarkable. The lungs appear clear. No pleural fluid evident. No pneumothorax is seen. No acute osseous abnormality is identified. Left axillary clips. No acute pulmonary disease. Calcific atherosclerotic disease aorta.      Xr Chest Portable    Result Date: 5/7/2019  EXAMINATION: SINGLE XRAY VIEW OF THE CHEST 5/7/2019 3:57 pm COMPARISON: 04/19/2019 HISTORY: ORDERING SYSTEM PROVIDED HISTORY: PAIN TECHNOLOGIST PROVIDED HISTORY: Reason for exam:->PAIN Ordering Physician Provided Reason for Exam: bilat leg swelling, hx chf, hx heart attacks, hx heart stents Acuity: Acute Type of Exam: Initial FINDINGS: Cardiac silhouette is normal.  Thoracic aortic calcification is present. Surgical clip projects over the right hilum unchanged. There also surgical clips projecting over the left axilla. No focal airspace disease is identified. No acute cardiopulmonary disease. Xr Chest Portable    Result Date: 4/8/2019  EXAMINATION: SINGLE XRAY VIEW OF THE CHEST 4/8/2019 11:45 am COMPARISON: 10/04/2018 HISTORY: ORDERING SYSTEM PROVIDED HISTORY: cp TECHNOLOGIST PROVIDED HISTORY: Reason for exam:->cp Ordering Physician Provided Reason for Exam: cough, congestion, chest pain Acuity: Acute Type of Exam: Initial Relevant Medical/Surgical History: smoker 25-year-old female with acute chest pain, cough, and congestion FINDINGS: Cardiac monitor leads overlie the chest. Cardiac and mediastinal contours unchanged and within normal limits. Surgical clips project over the left axillary region. No acute focal airspace consolidation or pleural effusions. No pneumothorax. No free air. No mediastinal shift. Visualized osseous structures remain unchanged. No acute focal airspace consolidation.      Vl Dup Lower Extremity Arteries Bilateral    Result Date: 4/18/2019  Lower Extremities Arterial Duplex  Demographics   Patient Name       [address] [place] [address]   Date of Study      04/18/2019         Gender              Female   Patient Number     [id number]         Date of Birth       1965   Visit Number       [id number]          Age                 52 year(s)   Accession Number   [id number]          Room Number         OP   Corporate ID       [id number]           Sonographer         Samuel Gutierrez RVT disease. Signature   ------------------------------------------------------------------  Electronically signed by Colt Underwood MD, Keaton Watt  (Interpreting physician) on 04/18/2019 at 03:00 PM  ------------------------------------------------------------------  Patient Status:Routine. Study Linda  - Vascular Lab. Technical Quality:Adequate visualization.   - Results were reported to:Dr Lee 11:30. Risk Factors History +---------+----------+-------------------------------------------------------+ ! Diagnosis! Date      ! Comments                                               ! +---------+----------+-------------------------------------------------------+ ! Other    !08/02/2018! Left leg swelling and rubor for over a month. ! !         ! !Occasional bilateral leg pain when walking and cramping! !         !          !at night. History of Rt SFA stent Nov.2017. History of ! !         !          !PAD, CAD with stent and CHF. Patient states recent BP  ! !         !          !have been low.                                         ! +---------+----------+-------------------------------------------------------+ ! Other    !08/02/2018! RT Brach: 100 Lt Brach: 106                            ! ! !          !RT PITO: 100 Lt PITO: 120                                ! ! !          !RT PTA: 120 Lt PTA: 110                                ! ! !          !RT ZACH: 1.13 Lt ZACH: 1.13                              ! +---------+----------+-------------------------------------------------------+ ! Other    !04/18/2019! Bilateral leg pain with standing and ambulation X < 2  ! !         !          !blocks                                                 ! ! ! !History of bilateral LE stents                         ! +---------+----------+-------------------------------------------------------+ ! Other    !04/18/2019! Rt brach: 100 Lt brach: 104 ! !         !          !Rt PTA: 30 Rt DPA: 20                                  ! !         !          !                                                       ! ! !          !Rt ZACH: 0.28                                           ! +---------+----------+-------------------------------------------------------+   - The patient's risk factor(s) include: diabetes mellitus, dyslipidemia and     obesity.   - The patient has a current/recent (within 1 year) tobacco history. Velocities are measured in cm/s ; Diameters are measured in mm LE Duplex Measurements +-------------------++-----+-----+----------++----+-----+----------+ ! ! !Right! ! Left      !!    !     !          ! +-------------------++-----+-----+----------++----+-----+----------+ ! Location           ! !PSV  ! Ratio! Wave Desc. !!PSV ! Ratio! Wave Desc.! +-------------------++-----+-----+----------++----+-----+----------+ ! Prox Common Femoral!!88   ! ! Monophasic!!99  ! ! Monophasic! +-------------------++-----+-----+----------++----+-----+----------+ ! Dist Common Femoral!!108  ! ! Monophasic!!72  ! ! Monophasic! +-------------------++-----+-----+----------++----+-----+----------+ ! PFA                !!239  ! ! Monophasic!!195 ! ! Monophasic! +-------------------++-----+-----+----------++----+-----+----------+ ! Prox SFA           !!0    !0    ! Absent    !!0   !0    ! Absent    ! +-------------------++-----+-----+----------++----+-----+----------+ ! Mid SFA            !!46.1 ! ! Monophasic!!50.5! ! Monophasic! +-------------------++-----+-----+----------++----+-----+----------+ ! Dist SFA           !!56.6 !1.23 ! Monophasic!!45.2!0.9  ! Monophasic! +-------------------++-----+-----+----------++----+-----+----------+ ! Prox Popliteal     !!63.2 !1.12 ! Monophasic! !58  !1.28 ! Monophasic! +-------------------++-----+-----+----------++----+-----+----------+ ! Dist Popliteal     !!52.7 !0.83 ! Monophasic!!67.6!1.17 ! Monophasic! +-------------------++-----+-----+----------++----+-----+----------+ ! Mid PTA            !!0    !0    ! Absent    !!26.7!0.39 ! Monophasic! +-------------------++-----+-----+----------++----+-----+----------+ ! Dist PTA           !!24.3 ! ! Monophasic!!29.3!1.1  ! Monophasic! +-------------------++-----+-----+----------++----+-----+----------+ ! Dist PITO           !!19.9 !0.38 ! Monophasic!!8.68!0.13 ! Monophasic! +-------------------++-----+-----+----------++----+-----+----------+    Cta Abdominal Aorta W Bilat Runoff W Contrast    Result Date: 4/23/2019  EXAMINATION: CTA OF THE AORTA WITH LOWER EXTREMITY RUNOFF 4/23/2019 11:09 am 3D reconstructed images were performed on a separate workstation and provided for review. TECHNIQUE: CTA of the pelvis and bilateral lower extremities was performed after the administration of intravenous contrast.   Multiplanar reformatted images are provided for review. MIP images are provided for review. Dose modulation, iterative reconstruction, and/or weight based adjustment of the mA/kV was utilized to reduce the radiation dose to as low as reasonably achievable. 3D reconstructed images were performed on a separate workstation and provided for review. COMPARISON: CT abdomen and pelvis February 18, 2016. No prior CTA. HISTORY: ORDERING SYSTEM PROVIDED HISTORY: PVD (peripheral vascular disease) ([facility] TECHNOLOGIST PROVIDED HISTORY: Reason for exam:->PAD, claudication pain, to have peripheral angiogram this week Ordering Physician Provided Reason for Exam: bilat lower ext pain with swelling X 4 days, hx of cludication Acuity: Chronic Type of Exam: Subsequent/Follow-up Relevant Medical/Surgical History: surgeries gallbladder, previous lower ext stents FINDINGS: Nonvascular Lower Chest: The lung bases are without acute process. Organs: There is mild pneumobilia. Patient is status post cholecystectomy. The liver, spleen, pancreas, and adrenal glands are without acute process.  Mild superficial femoral artery is markedly diminutive. The right popliteal artery is diminutive patent with mild multilevel irregularity due to plaque. There is standard branching of the tibioperoneal trunk. The anterior and posterior tibial arteries are occluded at the level the mid leg. Peroneal is patent to the level of the ankle. There is reconstitution the posterior tibial artery just above the ankle. The peroneal artery min occluded. There single vessel runoff to the right foot. Left leg: There is mild stenosis of the common femoral artery. The profunda is patent but diminutive. There is a short stent at the origin of the superficial femoral artery which is occluded. A long segment of the superficial femoral artery is completely occluded to the level the adductor canal right there is reconstitution. The reconstituted popliteal artery is diminutive but patent. There is standard branching of the tibioperoneal trunk. The anterior tibial artery is very diminutive distally but patent. Peroneal artery terminates at the level of the ankle. Posterior tibial artery crosses the ankle to supply the foot. There is 2 vessel runoff to the left foot. 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 There is at least mild stenosis of the celiac artery and bilateral renal arteries. A moderate amount of atherosclerotic plaque is seen within the infrarenal abdominal aorta and both iliac arteries causing mild stenosis. Severe outflow disease. There is complete occlusion of the right SFA stent along its entire course with reconstitution distally. There is complete occlusion of the left proximal short SFA stent and the remainder of the left SFA with reconstitution distally. Runoff disease bilaterally. There is single vessel runoff to the right foot and 2 vessel runoff to the left foot. Mild pneumobilia which may be from recent procedure.   Clinical correlation recommended. New Prescriptions    No medications on file       Akhil Lewis PA-C  [facility]  [phone]    In 1 week    Patient's creatinine is down to 1.2 which is the best it's been in a long long time ago and she did not take her diuretic this morning but I felt that her situation is going to be an art in managing diuretic versus her kidneys I've I suggested that she establish a relationship with the nephrologist she seems to understand this  Patient at this point since she had gained a large amount of leg swelling and edema with documented weight gain I did recommend restarting her Demadex and then getting reevaluated in 1 week she is also to monitor her daily weights    1. Weight gain    2. Leg swelling    3. Leg edema    4. Bilateral leg edema    5. Peripheral vascular disease ([address]    6. Cardiomyopathy, unspecified type ([address]    7. Pulmonary hypertension ([address]    8. History of tobacco abuse    9. Insulin dependent type 2 diabetes mellitus ([address]    10. Anticoagulated by anticoagulation treatment          Patient was advised at any time to return to the emergency department if there was any worsening.                  Apple Sheets MD  05/07/19 Garrett Dodd MD  05/07/19 [id number]

## 2019-05-07 NOTE — ED NOTES
Pt roomed and updated on POC. Pt alert and oriented and without distress, call light within reach, will cont to monitor.       Frantz Huff RN  05/07/19 [id number]

## 2019-05-07 NOTE — TELEPHONE ENCOUNTER
Spoke to pt. She is not feeling any better. Still in a lot of pain. Pt said she will go to the [address] [place] [address] [address] and get checked out as her ride can get to her. Pt v/u.

## 2019-05-07 NOTE — ED NOTES
Bed: 07  Expected date:   Expected time:   Means of arrival:   Comments:   Harriet Ferraro CNP patient     Camden Clarke RN  05/07/19 0694

## 2019-05-08 ENCOUNTER — TELEPHONE (OUTPATIENT)
Dept: CARDIOLOGY CLINIC | Age: 54
End: 2019-05-08

## 2019-05-08 ENCOUNTER — OFFICE VISIT (OUTPATIENT)
Dept: CARDIOLOGY CLINIC | Age: 54
End: 2019-05-08
Payer: COMMERCIAL

## 2019-05-08 VITALS
DIASTOLIC BLOOD PRESSURE: 58 MMHG | HEART RATE: 102 BPM | WEIGHT: 232 LBS | HEIGHT: 64 IN | SYSTOLIC BLOOD PRESSURE: 90 MMHG | OXYGEN SATURATION: 98 % | BODY MASS INDEX: 39.61 KG/M2

## 2019-05-08 DIAGNOSIS — E78.2 MIXED HYPERLIPIDEMIA: Chronic | ICD-10-CM

## 2019-05-08 DIAGNOSIS — I25.10 CORONARY ARTERY DISEASE INVOLVING NATIVE CORONARY ARTERY OF NATIVE HEART WITHOUT ANGINA PECTORIS: ICD-10-CM

## 2019-05-08 DIAGNOSIS — I10 ESSENTIAL HYPERTENSION: Chronic | ICD-10-CM

## 2019-05-08 DIAGNOSIS — Z72.0 TOBACCO ABUSE: Chronic | ICD-10-CM

## 2019-05-08 DIAGNOSIS — I50.22 CHRONIC SYSTOLIC CONGESTIVE HEART FAILURE (HCC): Primary | ICD-10-CM

## 2019-05-08 DIAGNOSIS — I73.9 PVD (PERIPHERAL VASCULAR DISEASE) (HCC): ICD-10-CM

## 2019-05-08 PROCEDURE — 1111F DSCHRG MED/CURRENT MED MERGE: CPT | Performed by: INTERNAL MEDICINE

## 2019-05-08 PROCEDURE — 3017F COLORECTAL CA SCREEN DOC REV: CPT | Performed by: INTERNAL MEDICINE

## 2019-05-08 PROCEDURE — G8427 DOCREV CUR MEDS BY ELIG CLIN: HCPCS | Performed by: INTERNAL MEDICINE

## 2019-05-08 PROCEDURE — 99215 OFFICE O/P EST HI 40 MIN: CPT | Performed by: INTERNAL MEDICINE

## 2019-05-08 PROCEDURE — G8598 ASA/ANTIPLAT THER USED: HCPCS | Performed by: INTERNAL MEDICINE

## 2019-05-08 PROCEDURE — 4004F PT TOBACCO SCREEN RCVD TLK: CPT | Performed by: INTERNAL MEDICINE

## 2019-05-08 PROCEDURE — G8417 CALC BMI ABV UP PARAM F/U: HCPCS | Performed by: INTERNAL MEDICINE

## 2019-05-08 NOTE — PROGRESS NOTES
[address]   CARDIAC EVALUATION NOTE  [phone]      PCP:  Yosi Koch PA-C    Reason for Consultation/Chief Complaint: follow up    Subjective   History of Present Illness:  Charmayne Novas is a 52 y.o. patient with a history of CAD, CM, CHF, COPD, PVD, Pulm HTN, HTN, HLD and DM who presents for abnormal CTA abd with run off. Her LHC from 05/25/18 showed patent LAD and Circ stents with reduced EF of 30%. Her echo from 04/03/19 showed her EF was 40-45% with hypokinetic of the apex and apical-septal segment and mild MR. Her carotid doppler from 05/24/18 showed <50% plaque bilaterally. Her BLE doppler from 04/18/19 showed severe arterial insufficiency of BLE. There occlusive disease of the right proximal superficial femoral  (noted stent) and mid posterior tibial arteries. There is a 50-74% stenosis  at 4the right deep femoral artery with evidence of inflow disease. There is occlusive disease of the left proximal superficial  femoral artery (noted stent). There is a 30-49% stenosis at the left deep  femoral artery with evidence of inflow disease. Her RHC from 02/14/19 showed pulm venous HTN with elevated pressures and PWCP of 28. She also had successful placement of CardioMems HF monitoring device n the left pulm artery. 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 There is at least mild stenosis of the celiac artery and bilateral renal arteries. A moderate amount of atherosclerotic plaque is seen within the infrarenal abdominal aorta and both iliac arteries causing mild stenosis. Severe outflow disease. There is complete occlusion of the right SFA stent along its entire course with reconstitution distally. There is complete occlusion of the left proximal short SFA stent and the remainder of the left SFA with reconstitution distally. Runoff disease bilaterally.   There is single vessel runoff to the right foot and 2 vessel runoff to the left foot. She reports she has leg cramping after walking a few as 20 steps. She states she needs to sit down to rest her legs due to the pain. She does not have wounds but feels same to worse as w/ prior claudication. She called into the office on 05/06/19 with increased leg pain, BLE edema and dyspnea. She was instructed to go to the ER. She presented to the ER on 05/06/19. Her torsemide was adjusted to 50 mg daily. Today she reports her feet have increased reddeness and edematous. She states her feet and legs are painful. She reports she has numbness at times. She has pain with walking. She denies CP, SOB, dizziness or syncope. Past Medical History:   has a past medical history of Anxiety and depression, CAD (coronary artery disease), Cardiomyopathy ([address] CHF (congestive heart failure) ([address] COPD (chronic obstructive pulmonary disease) ([address] Diabetes mellitus ([address] GERD (gastroesophageal reflux disease), Hyperlipidemia, Hypertension, Peripheral neuropathy, Peripheral vascular disease ([address] Pulmonary HTN ([address] Reflux, Sleep apnea, and Wears glasses. Surgical History:   has a past surgical history that includes Tonsillectomy; Cholecystectomy; tumor excision; Upper gastrointestinal endoscopy (4/25/2013); Upper gastrointestinal endoscopy (12/10/2015); Upper gastrointestinal endoscopy (01/06/2017); Arterial bypass surgry (Left, 01/06/2016); Cardiac catheterization (03/27/2018); Coronary angioplasty with stent (03/16/2018); Rotator cuff repair (Left, 04/22/2016); Coronary angioplasty with stent (01/03/2018); and Insertable Cardiac Monitor (02/14/2019). Social History:   reports that she has been smoking cigarettes. She has a 15.00 pack-year smoking history. She has never used smokeless tobacco. She reports that she does not drink alcohol or use drugs.      Family History:  family history includes Cancer in her maternal aunt and mother; General Appearance:  Alert, cooperative, no distress, appears stated age   Head:  Normocephalic, without obvious abnormality, atraumatic   Eyes:  PERRL, conjunctiva/corneas clear   Nose: Nares normal, no drainage or sinus tenderness   Throat: Lips, mucosa, and tongue normal   Neck: Supple, symmetrical, trachea midline, no adenopathy, thyroid: not enlarged, symmetric, no tenderness/mass/nodules, no carotid bruit or JVD   Lungs:   Clear to auscultation bilaterally, respirations unlabored   Chest Wall:  No deformity or tenderness   Heart:  Regular rate and rhythm, S1, S2 normal, 2/6 sm   Abdomen:   Soft, non-tender, bowel sounds active all four quadrants,  no masses, no organomegaly   Extremities: Extremities atraumatic, no cyanosis or edema. Both feet reddish and slow to fill.     Pulses: Severely decrs pulses in LE   Skin: Skin color notable for dependen rubor in bilat feet   Pysch: Normal mood and affect   Neurologic: Normal gross motor and sensory exam.         Labs   CBC:   Lab Results   Component Value Date    WBC 10.7 05/07/2019    RBC 4.50 05/07/2019    HGB 13.6 05/07/2019    HCT 40.6 05/07/2019    MCV 90.2 05/07/2019    RDW 14.6 05/07/2019     05/07/2019     CMP:  Lab Results   Component Value Date     05/07/2019    K 3.7 05/07/2019    K 3.5 04/19/2019    CL 94 05/07/2019    CO2 33 05/07/2019    BUN 23 05/07/2019    CREATININE 1.2 05/07/2019    GFRAA 56 05/07/2019    AGRATIO 1.1 05/07/2019    LABGLOM 47 05/07/2019    GLUCOSE 113 05/07/2019    PROT 6.9 05/07/2019    CALCIUM 9.5 05/07/2019    BILITOT 0.3 05/07/2019    ALKPHOS 96 05/07/2019    AST 11 05/07/2019    ALT 9 05/07/2019     PT/INR:  No results found for: PTINR  HgBA1c:  Lab Results   Component Value Date    LABA1C 11.7 11/28/2018     Lab Results   Component Value Date    TROPONINI <0.01 04/19/2019         Cardiac Data     Last EKG:   nsr lt axis rbbb anter infarct    Echo: 04/03/19  Summary   LV systolic function is mildly reduced with EF estimated at 40-45%. There is severe HK of the apex and apical-septal segment. Normal left ventricular diastolic filling pressure. Mild mitral regurgitation. Systolic pulmonary artery pressure (SPAP) estimated at 32mmHg (RA pressure   3mmHg). URI: 05/25/18   Ejection fraction is visually estimated to be 30-35 %. There is akinesis of the apex and inferior walls. Left ventricular size is mildly increased. Moderate mitral regurgitation. Moderate amount plaque is noted in the aorta. The left atrium is mildly dilated. 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 A bubble study was performed and fails to show evidence of shunting. Stress Test:    Left Cath: 05/25/18  Procedure Performed:  1. Coronary angiogram  2. Left heart cath  3. Left ventriculogram  4. Right heart cath     Findings:  1. Patent LAD and CIRC stents              ~mild CAD  2. Reduced LVEF 30% with anterior and apical akinesis  3. Moderate pulmonary hyperension     Conclusion/plan of care:  1. Medical management    RHC: 02/14/19     RA: 12  RV:60/12   PA: 60/30   and mean of 39  PCWP: 28     Sats: on RA  Ao: 92%  RA: 50%  PA: 49%       [place] Thermodilution   Cardiac Output 3.68     Cardiac Index 1.8     SVR 1673                CONCLUSIONS   1. Successful placement of CardioMEMS internal Heart failure monitoring device in the LEFT pulmonary artery. 2. CardioMems Serial Number: [id number]  3. Pulmonary venous hypertension with elevated filling pressures and PCWP of 28  4. Poor cardiac outpt and venous sats  5. Given pt's clinical hx, and touble with diuresis and SHAUNNA, suspect element of cardiorenal causing SHAUNNA and would consider V2omax testing and possible LVAD eval for poor cardiac outpt. Recommendations   1.) ASA and plavix for 1 month following procedure minimum  2.) Patient should transmit daily home CardioMems recordings as instructed  3.) F/u with CHF team for further care     Carotid: 05/24/18   1.  There is moderate plaque seen in the right internal carotid artery with  an estimated diameter reduction of <50%. This could be underestimated due to  calcific shadowing. 2. There is moderate plaque seen in the left internal carotid artery with an  estimated diameter reduction of <50%. This could be underestimated due to  calcific shadowing. 3. The vertebral arteries are patent with antegrade flow bilaterally. BLE arterial dopper: 04/18/19     1. There is severe arterial insufficiency at rest involving the right lower  extremity. There occlusive disease of the right proximal superficial femoral  (noted stent) and mid posterior tibial arteries. There is a 50-74% stenosis  at 4the right deep femoral artery with evidence of inflow disease. 2. There is severe arterial insufficiency at rest involving the left lower  extremity. There is occlusive disease of the left proximal superficial  femoral artery (noted stent). There is a 30-49% stenosis at the left deep  femoral artery with evidence of inflow disease. 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 There is at least mild stenosis of the celiac artery and bilateral renal arteries. A moderate amount of atherosclerotic plaque is seen within the infrarenal abdominal aorta and both iliac arteries causing mild stenosis. Severe outflow disease. There is complete occlusion of the right SFA stent along its entire course with reconstitution distally. There is complete occlusion of the left proximal short SFA stent and the remainder of the left SFA with reconstitution distally. Runoff disease bilaterally. There is single vessel runoff to the right foot and 2 vessel runoff to the left foot. Mild pneumobilia which may be from recent procedure. Clinical correlation recommended.        Studies:       I have reviewed labs and imaging/xray/diagnostic testing in this

## 2019-05-08 NOTE — TELEPHONE ENCOUNTER
Pt was seen today with . Lucy Vaughan informed pt to followup in 3months. At checkout, pt stated she sees Shereen Mcclellan, SARAH Persaud, and now Lucy Vaughan. I believe she saw Lucy Vaughan today because Juan F Barrera is OOT this week. Please advise who pt should see for interventional and if pt should keep her 6-5-19 with npdd or just have pt come back in 3months to see what doctor. I informed the pt that we need to get this clarification because she said she sees PEDRO for her potassium and swelling, and sees  for her cardiomems, and now saw Arianna Kurtz for her legs. I think medical staff should clarify this with pt. Thank you.

## 2019-05-08 NOTE — PATIENT INSTRUCTIONS
1. Smoking cessation discussed  2. Discussed peripheral angioplasty versus surgery   3. Referral to Dr. Abdi Anthony  4. Follow up with Dr Omer Jon [phone]  5.  Follow up in 3 months

## 2019-05-10 ENCOUNTER — OFFICE VISIT (OUTPATIENT)
Dept: VASCULAR SURGERY | Age: 54
End: 2019-05-10
Payer: COMMERCIAL

## 2019-05-10 VITALS
DIASTOLIC BLOOD PRESSURE: 68 MMHG | HEIGHT: 65 IN | WEIGHT: 233 LBS | SYSTOLIC BLOOD PRESSURE: 100 MMHG | BODY MASS INDEX: 38.82 KG/M2

## 2019-05-10 DIAGNOSIS — I73.9 PVD (PERIPHERAL VASCULAR DISEASE) WITH CLAUDICATION (HCC): Primary | Chronic | ICD-10-CM

## 2019-05-10 PROCEDURE — G8427 DOCREV CUR MEDS BY ELIG CLIN: HCPCS | Performed by: SURGERY

## 2019-05-10 PROCEDURE — 99243 OFF/OP CNSLTJ NEW/EST LOW 30: CPT | Performed by: SURGERY

## 2019-05-10 PROCEDURE — G8417 CALC BMI ABV UP PARAM F/U: HCPCS | Performed by: SURGERY

## 2019-05-13 ENCOUNTER — SURG/PROC ORDERS (OUTPATIENT)
Dept: VASCULAR SURGERY | Age: 54
End: 2019-05-13

## 2019-05-13 DIAGNOSIS — I73.9 PVD (PERIPHERAL VASCULAR DISEASE) WITH CLAUDICATION (HCC): Primary | ICD-10-CM

## 2019-05-13 DIAGNOSIS — Z01.818 PRE-OP TESTING: Primary | ICD-10-CM

## 2019-05-13 DIAGNOSIS — Z01.818 PRE-OP TESTING: ICD-10-CM

## 2019-05-13 NOTE — PROGRESS NOTES
Obstructive Sleep Apnea (CLINT) Screening     Patient:  Arminda Craig    YOB: 1965      Medical Record #:  [id number]                     Date:  5/13/2019     1. Are you a loud and/or regular snorer? [x]  Yes       [] No    2. Have you been observed to gasp or stop breathing during sleep? [x]  Yes       [] No    3. Do you feel tired or groggy upon awakening or do you awaken with a headache? [x]  Yes       [] No    4. Are you often tired or fatigued during the wake time hours? []  Yes       [x] No    5. Do you fall asleep sitting, reading, watching TV or driving? []  Yes       [x] No    6. Do you often have problems with memory or concentration? []  Yes       [x] No    **If patient's score is ? 3 they are considered high risk for CLINT. Notify the anesthesiologist of the high risk and document in focus note. Note:  If the patient's BMI is more than 35 kg m¯² , has neck circumference > 40 cm, and/or high blood pressure the risk is greater (© American [employer]nea Association, 2006).

## 2019-05-14 ENCOUNTER — SURG/PROC ORDERS (OUTPATIENT)
Dept: VASCULAR SURGERY | Age: 54
End: 2019-05-14

## 2019-05-14 DIAGNOSIS — Z01.818 PREOP TESTING: Primary | ICD-10-CM

## 2019-05-15 ENCOUNTER — TELEPHONE (OUTPATIENT)
Dept: VASCULAR SURGERY | Age: 54
End: 2019-05-15

## 2019-05-15 ENCOUNTER — HOSPITAL ENCOUNTER (OUTPATIENT)
Dept: VASCULAR LAB | Age: 54
Discharge: HOME | DRG: 181 | End: 2019-05-15
Payer: COMMERCIAL

## 2019-05-15 ENCOUNTER — ANESTHESIA EVENT (OUTPATIENT)
Dept: OPERATING ROOM | Age: 54
DRG: 181 | End: 2019-05-15
Payer: COMMERCIAL

## 2019-05-15 ENCOUNTER — HOSPITAL ENCOUNTER (OUTPATIENT)
Age: 54
Discharge: HOME | DRG: 181 | End: 2019-05-15
Payer: COMMERCIAL

## 2019-05-15 ENCOUNTER — PROCEDURE VISIT (OUTPATIENT)
Dept: CARDIOLOGY CLINIC | Age: 54
End: 2019-05-15
Payer: COMMERCIAL

## 2019-05-15 DIAGNOSIS — I73.9 PVD (PERIPHERAL VASCULAR DISEASE) WITH CLAUDICATION (HCC): ICD-10-CM

## 2019-05-15 DIAGNOSIS — I50.22 CHRONIC SYSTOLIC CONGESTIVE HEART FAILURE (HCC): Primary | ICD-10-CM

## 2019-05-15 DIAGNOSIS — Z01.818 PRE-OP TESTING: ICD-10-CM

## 2019-05-15 LAB
ANION GAP SERPL CALCULATED.3IONS-SCNC: 16 MMOL/L (ref 3–16)
BILIRUBIN URINE: NEGATIVE
BLOOD, URINE: NEGATIVE
BUN BLDV-MCNC: 25 MG/DL (ref 7–20)
CALCIUM SERPL-MCNC: 9.3 MG/DL (ref 8.3–10.6)
CHLORIDE BLD-SCNC: 82 MMOL/L (ref 99–110)
CLARITY: CLEAR
CO2: 33 MMOL/L (ref 21–32)
COLOR: YELLOW
CREAT SERPL-MCNC: 1.4 MG/DL (ref 0.6–1.1)
GFR AFRICAN AMERICAN: 47
GFR NON-AFRICAN AMERICAN: 39
GLUCOSE BLD-MCNC: 223 MG/DL (ref 70–99)
GLUCOSE URINE: NEGATIVE MG/DL
KETONES, URINE: NEGATIVE MG/DL
LEUKOCYTE ESTERASE, URINE: NEGATIVE
MICROSCOPIC EXAMINATION: NORMAL
NITRITE, URINE: NEGATIVE
PH UA: 7.5 (ref 5–8)
POTASSIUM SERPL-SCNC: 3.6 MMOL/L (ref 3.5–5.1)
PROTEIN UA: NEGATIVE MG/DL
SODIUM BLD-SCNC: 131 MMOL/L (ref 136–145)
SPECIFIC GRAVITY UA: <=1.005 (ref 1–1.03)
URINE REFLEX TO CULTURE: NORMAL
URINE TYPE: NORMAL
UROBILINOGEN, URINE: 0.2 E.U./DL

## 2019-05-15 PROCEDURE — 36415 COLL VENOUS BLD VENIPUNCTURE: CPT

## 2019-05-15 PROCEDURE — 93264 REM MNTR WRLS P-ART PRS SNR: CPT | Performed by: NURSE PRACTITIONER

## 2019-05-15 PROCEDURE — 93971 EXTREMITY STUDY: CPT

## 2019-05-15 PROCEDURE — 80048 BASIC METABOLIC PNL TOTAL CA: CPT

## 2019-05-15 PROCEDURE — 81003 URINALYSIS AUTO W/O SCOPE: CPT

## 2019-05-15 RX ORDER — SODIUM CHLORIDE 0.9 % (FLUSH) 0.9 %
10 SYRINGE (ML) INJECTION EVERY 12 HOURS SCHEDULED
Status: CANCELLED | OUTPATIENT
Start: 2019-05-15

## 2019-05-15 RX ORDER — SODIUM CHLORIDE 0.9 % (FLUSH) 0.9 %
10 SYRINGE (ML) INJECTION PRN
Status: CANCELLED | OUTPATIENT
Start: 2019-05-15

## 2019-05-15 NOTE — TELEPHONE ENCOUNTER
Called patient as reminder for surgery tomorrow 5/16/19 at [place]. Arrive at 5:30am/surgery at 7:30AM. Npo after midnight. aimee

## 2019-05-16 ENCOUNTER — ANESTHESIA (OUTPATIENT)
Dept: OPERATING ROOM | Age: 54
DRG: 181 | End: 2019-05-16
Payer: COMMERCIAL

## 2019-05-16 ENCOUNTER — HOSPITAL ENCOUNTER (INPATIENT)
Age: 54
LOS: 2 days | Discharge: HOME | DRG: 181 | End: 2019-05-18
Attending: SURGERY | Admitting: SURGERY
Payer: COMMERCIAL

## 2019-05-16 ENCOUNTER — APPOINTMENT (OUTPATIENT)
Dept: GENERAL RADIOLOGY | Age: 54
DRG: 181 | End: 2019-05-16
Attending: SURGERY
Payer: COMMERCIAL

## 2019-05-16 VITALS
TEMPERATURE: 98.4 F | SYSTOLIC BLOOD PRESSURE: 130 MMHG | DIASTOLIC BLOOD PRESSURE: 77 MMHG | OXYGEN SATURATION: 100 % | RESPIRATION RATE: 4 BRPM

## 2019-05-16 PROBLEM — I73.9 CLAUDICATION IN PERIPHERAL VASCULAR DISEASE (HCC): Status: ACTIVE | Noted: 2019-05-16

## 2019-05-16 LAB
ABO/RH: NORMAL
ANTIBODY SCREEN: NORMAL
GLUCOSE BLD-MCNC: 120 MG/DL (ref 70–99)
GLUCOSE BLD-MCNC: 257 MG/DL (ref 70–99)
GLUCOSE BLD-MCNC: 327 MG/DL (ref 70–99)
GLUCOSE BLD-MCNC: 341 MG/DL (ref 70–99)
PERFORMED ON: ABNORMAL

## 2019-05-16 PROCEDURE — 98960 EDU&TRN PT SELF-MGMT NQHP 1: CPT

## 2019-05-16 PROCEDURE — 6370000000 HC RX 637 (ALT 250 FOR IP): Performed by: ANESTHESIOLOGY

## 2019-05-16 PROCEDURE — P9045 ALBUMIN (HUMAN), 5%, 250 ML: HCPCS | Performed by: NURSE ANESTHETIST, CERTIFIED REGISTERED

## 2019-05-16 PROCEDURE — 35556 ART BYP GRFT FEM-POPLITEAL: CPT | Performed by: SURGERY

## 2019-05-16 PROCEDURE — 7100000001 HC PACU RECOVERY - ADDTL 15 MIN: Performed by: SURGERY

## 2019-05-16 PROCEDURE — 2060000000 HC ICU INTERMEDIATE R&B

## 2019-05-16 PROCEDURE — 76000 FLUOROSCOPY <1 HR PHYS/QHP: CPT

## 2019-05-16 PROCEDURE — 2500000003 HC RX 250 WO HCPCS: Performed by: NURSE ANESTHETIST, CERTIFIED REGISTERED

## 2019-05-16 PROCEDURE — 6370000000 HC RX 637 (ALT 250 FOR IP): Performed by: SURGERY

## 2019-05-16 PROCEDURE — 041K09L BYPASS RIGHT FEMORAL ARTERY TO POPLITEAL ARTERY WITH AUTOLOGOUS VENOUS TISSUE, OPEN APPROACH: ICD-10-PCS | Performed by: SURGERY

## 2019-05-16 PROCEDURE — 2700000000 HC OXYGEN THERAPY PER DAY

## 2019-05-16 PROCEDURE — C2628 CATHETER, OCCLUSION: HCPCS | Performed by: SURGERY

## 2019-05-16 PROCEDURE — 86900 BLOOD TYPING SEROLOGIC ABO: CPT

## 2019-05-16 PROCEDURE — 06BP0ZZ EXCISION OF RIGHT SAPHENOUS VEIN, OPEN APPROACH: ICD-10-PCS | Performed by: SURGERY

## 2019-05-16 PROCEDURE — 2580000003 HC RX 258: Performed by: SURGERY

## 2019-05-16 PROCEDURE — 3600000007 HC SURGERY HYBRID BASE: Performed by: SURGERY

## 2019-05-16 PROCEDURE — 6360000002 HC RX W HCPCS: Performed by: SURGERY

## 2019-05-16 PROCEDURE — 6360000002 HC RX W HCPCS: Performed by: NURSE ANESTHETIST, CERTIFIED REGISTERED

## 2019-05-16 PROCEDURE — 94761 N-INVAS EAR/PLS OXIMETRY MLT: CPT

## 2019-05-16 PROCEDURE — 86901 BLOOD TYPING SEROLOGIC RH(D): CPT

## 2019-05-16 PROCEDURE — 3700000000 HC ANESTHESIA ATTENDED CARE: Performed by: SURGERY

## 2019-05-16 PROCEDURE — 6360000002 HC RX W HCPCS: Performed by: ANESTHESIOLOGY

## 2019-05-16 PROCEDURE — 51702 INSERT TEMP BLADDER CATH: CPT

## 2019-05-16 PROCEDURE — 94640 AIRWAY INHALATION TREATMENT: CPT

## 2019-05-16 PROCEDURE — 6360000004 HC RX CONTRAST MEDICATION: Performed by: SURGERY

## 2019-05-16 PROCEDURE — 2580000003 HC RX 258: Performed by: ANESTHESIOLOGY

## 2019-05-16 PROCEDURE — 7100000000 HC PACU RECOVERY - FIRST 15 MIN: Performed by: SURGERY

## 2019-05-16 PROCEDURE — 3700000001 HC ADD 15 MINUTES (ANESTHESIA): Performed by: SURGERY

## 2019-05-16 PROCEDURE — C1894 INTRO/SHEATH, NON-LASER: HCPCS | Performed by: SURGERY

## 2019-05-16 PROCEDURE — 3600000017 HC SURGERY HYBRID ADDL 15MIN: Performed by: SURGERY

## 2019-05-16 PROCEDURE — 2709999900 HC NON-CHARGEABLE SUPPLY: Performed by: SURGERY

## 2019-05-16 PROCEDURE — 86850 RBC ANTIBODY SCREEN: CPT

## 2019-05-16 RX ORDER — OXYCODONE HYDROCHLORIDE AND ACETAMINOPHEN 5; 325 MG/1; MG/1
1 TABLET ORAL PRN
Status: COMPLETED | OUTPATIENT
Start: 2019-05-16 | End: 2019-05-16

## 2019-05-16 RX ORDER — SODIUM CHLORIDE, SODIUM LACTATE, POTASSIUM CHLORIDE, CALCIUM CHLORIDE 600; 310; 30; 20 MG/100ML; MG/100ML; MG/100ML; MG/100ML
INJECTION, SOLUTION INTRAVENOUS CONTINUOUS
Status: DISCONTINUED | OUTPATIENT
Start: 2019-05-16 | End: 2019-05-16

## 2019-05-16 RX ORDER — ARIPIPRAZOLE 5 MG/1
5 TABLET ORAL DAILY
Status: DISCONTINUED | OUTPATIENT
Start: 2019-05-16 | End: 2019-05-18 | Stop reason: HOSPADM

## 2019-05-16 RX ORDER — NITROGLYCERIN 0.4 MG/1
0.4 TABLET SUBLINGUAL EVERY 5 MIN PRN
Status: DISCONTINUED | OUTPATIENT
Start: 2019-05-16 | End: 2019-05-17

## 2019-05-16 RX ORDER — HYDROMORPHONE HCL 110MG/55ML
PATIENT CONTROLLED ANALGESIA SYRINGE INTRAVENOUS PRN
Status: DISCONTINUED | OUTPATIENT
Start: 2019-05-16 | End: 2019-05-16 | Stop reason: SDUPTHER

## 2019-05-16 RX ORDER — DOXEPIN HYDROCHLORIDE 25 MG/1
50 CAPSULE ORAL NIGHTLY
Status: DISCONTINUED | OUTPATIENT
Start: 2019-05-16 | End: 2019-05-18 | Stop reason: HOSPADM

## 2019-05-16 RX ORDER — ATORVASTATIN CALCIUM 80 MG/1
80 TABLET, FILM COATED ORAL NIGHTLY
Status: DISCONTINUED | OUTPATIENT
Start: 2019-05-16 | End: 2019-05-18 | Stop reason: HOSPADM

## 2019-05-16 RX ORDER — DEXTROSE MONOHYDRATE 25 G/50ML
12.5 INJECTION, SOLUTION INTRAVENOUS PRN
Status: DISCONTINUED | OUTPATIENT
Start: 2019-05-16 | End: 2019-05-18 | Stop reason: HOSPADM

## 2019-05-16 RX ORDER — MORPHINE SULFATE 2 MG/ML
1 INJECTION, SOLUTION INTRAMUSCULAR; INTRAVENOUS EVERY 5 MIN PRN
Status: DISCONTINUED | OUTPATIENT
Start: 2019-05-16 | End: 2019-05-16

## 2019-05-16 RX ORDER — SODIUM CHLORIDE, SODIUM LACTATE, POTASSIUM CHLORIDE, CALCIUM CHLORIDE 600; 310; 30; 20 MG/100ML; MG/100ML; MG/100ML; MG/100ML
INJECTION, SOLUTION INTRAVENOUS
Status: COMPLETED
Start: 2019-05-16 | End: 2019-05-16

## 2019-05-16 RX ORDER — LABETALOL HYDROCHLORIDE 5 MG/ML
10 INJECTION, SOLUTION INTRAVENOUS
Status: DISCONTINUED | OUTPATIENT
Start: 2019-05-16 | End: 2019-05-17

## 2019-05-16 RX ORDER — ALBUTEROL SULFATE 90 UG/1
2 AEROSOL, METERED RESPIRATORY (INHALATION) EVERY 6 HOURS PRN
Status: DISCONTINUED | OUTPATIENT
Start: 2019-05-16 | End: 2019-05-18 | Stop reason: HOSPADM

## 2019-05-16 RX ORDER — HYDROCODONE BITARTRATE AND ACETAMINOPHEN 5; 325 MG/1; MG/1
1 TABLET ORAL EVERY 4 HOURS PRN
Status: DISCONTINUED | OUTPATIENT
Start: 2019-05-16 | End: 2019-05-17

## 2019-05-16 RX ORDER — SODIUM CHLORIDE 0.9 % (FLUSH) 0.9 %
10 SYRINGE (ML) INJECTION EVERY 12 HOURS SCHEDULED
Status: DISCONTINUED | OUTPATIENT
Start: 2019-05-16 | End: 2019-05-16

## 2019-05-16 RX ORDER — PROPOFOL 10 MG/ML
INJECTION, EMULSION INTRAVENOUS PRN
Status: DISCONTINUED | OUTPATIENT
Start: 2019-05-16 | End: 2019-05-16 | Stop reason: SDUPTHER

## 2019-05-16 RX ORDER — SODIUM CHLORIDE 0.9 % (FLUSH) 0.9 %
10 SYRINGE (ML) INJECTION PRN
Status: DISCONTINUED | OUTPATIENT
Start: 2019-05-16 | End: 2019-05-16

## 2019-05-16 RX ORDER — PANTOPRAZOLE SODIUM 40 MG/1
40 TABLET, DELAYED RELEASE ORAL 2 TIMES DAILY
Status: DISCONTINUED | OUTPATIENT
Start: 2019-05-16 | End: 2019-05-18 | Stop reason: HOSPADM

## 2019-05-16 RX ORDER — MORPHINE SULFATE 4 MG/ML
4 INJECTION, SOLUTION INTRAMUSCULAR; INTRAVENOUS
Status: DISCONTINUED | OUTPATIENT
Start: 2019-05-16 | End: 2019-05-17

## 2019-05-16 RX ORDER — LIDOCAINE HYDROCHLORIDE 20 MG/ML
INJECTION, SOLUTION INFILTRATION; PERINEURAL PRN
Status: DISCONTINUED | OUTPATIENT
Start: 2019-05-16 | End: 2019-05-16 | Stop reason: SDUPTHER

## 2019-05-16 RX ORDER — MORPHINE SULFATE 2 MG/ML
2 INJECTION, SOLUTION INTRAMUSCULAR; INTRAVENOUS EVERY 5 MIN PRN
Status: DISCONTINUED | OUTPATIENT
Start: 2019-05-16 | End: 2019-05-16

## 2019-05-16 RX ORDER — BUPRENORPHINE AND NALOXONE 8; 2 MG/1; MG/1
2 FILM, SOLUBLE BUCCAL; SUBLINGUAL DAILY
Status: DISCONTINUED | OUTPATIENT
Start: 2019-05-16 | End: 2019-05-18 | Stop reason: HOSPADM

## 2019-05-16 RX ORDER — ROCURONIUM BROMIDE 10 MG/ML
INJECTION, SOLUTION INTRAVENOUS PRN
Status: DISCONTINUED | OUTPATIENT
Start: 2019-05-16 | End: 2019-05-16 | Stop reason: SDUPTHER

## 2019-05-16 RX ORDER — FENTANYL CITRATE 50 UG/ML
INJECTION, SOLUTION INTRAMUSCULAR; INTRAVENOUS PRN
Status: DISCONTINUED | OUTPATIENT
Start: 2019-05-16 | End: 2019-05-16 | Stop reason: SDUPTHER

## 2019-05-16 RX ORDER — SPIRONOLACTONE 100 MG/1
100 TABLET, FILM COATED ORAL 2 TIMES DAILY
Status: DISCONTINUED | OUTPATIENT
Start: 2019-05-16 | End: 2019-05-17

## 2019-05-16 RX ORDER — CARVEDILOL 6.25 MG/1
3.12 TABLET ORAL 2 TIMES DAILY WITH MEALS
Status: DISCONTINUED | OUTPATIENT
Start: 2019-05-16 | End: 2019-05-18 | Stop reason: HOSPADM

## 2019-05-16 RX ORDER — NICOTINE POLACRILEX 4 MG
15 LOZENGE BUCCAL PRN
Status: DISCONTINUED | OUTPATIENT
Start: 2019-05-16 | End: 2019-05-18 | Stop reason: HOSPADM

## 2019-05-16 RX ORDER — SODIUM CHLORIDE 0.9 % (FLUSH) 0.9 %
10 SYRINGE (ML) INJECTION EVERY 12 HOURS SCHEDULED
Status: DISCONTINUED | OUTPATIENT
Start: 2019-05-16 | End: 2019-05-18 | Stop reason: HOSPADM

## 2019-05-16 RX ORDER — INSULIN GLARGINE 100 [IU]/ML
35 INJECTION, SOLUTION SUBCUTANEOUS 2 TIMES DAILY
Status: DISCONTINUED | OUTPATIENT
Start: 2019-05-16 | End: 2019-05-18 | Stop reason: HOSPADM

## 2019-05-16 RX ORDER — IPRATROPIUM BROMIDE AND ALBUTEROL SULFATE 2.5; .5 MG/3ML; MG/3ML
1 SOLUTION RESPIRATORY (INHALATION) ONCE
Status: COMPLETED | OUTPATIENT
Start: 2019-05-16 | End: 2019-05-16

## 2019-05-16 RX ORDER — POTASSIUM CHLORIDE 20 MEQ/1
40 TABLET, EXTENDED RELEASE ORAL 2 TIMES DAILY
Status: DISCONTINUED | OUTPATIENT
Start: 2019-05-16 | End: 2019-05-17

## 2019-05-16 RX ORDER — LIDOCAINE HYDROCHLORIDE 10 MG/ML
1 INJECTION, SOLUTION EPIDURAL; INFILTRATION; INTRACAUDAL; PERINEURAL
Status: DISCONTINUED | OUTPATIENT
Start: 2019-05-16 | End: 2019-05-16

## 2019-05-16 RX ORDER — ALBUTEROL SULFATE 2.5 MG/3ML
2.5 SOLUTION RESPIRATORY (INHALATION) EVERY 4 HOURS PRN
Status: DISCONTINUED | OUTPATIENT
Start: 2019-05-16 | End: 2019-05-18 | Stop reason: HOSPADM

## 2019-05-16 RX ORDER — CLOPIDOGREL BISULFATE 75 MG/1
75 TABLET ORAL DAILY
Status: DISCONTINUED | OUTPATIENT
Start: 2019-05-16 | End: 2019-05-18 | Stop reason: HOSPADM

## 2019-05-16 RX ORDER — MORPHINE SULFATE 2 MG/ML
2 INJECTION, SOLUTION INTRAMUSCULAR; INTRAVENOUS
Status: DISCONTINUED | OUTPATIENT
Start: 2019-05-16 | End: 2019-05-17

## 2019-05-16 RX ORDER — ONDANSETRON 2 MG/ML
INJECTION INTRAMUSCULAR; INTRAVENOUS PRN
Status: DISCONTINUED | OUTPATIENT
Start: 2019-05-16 | End: 2019-05-16 | Stop reason: SDUPTHER

## 2019-05-16 RX ORDER — DEXTROSE MONOHYDRATE 50 MG/ML
100 INJECTION, SOLUTION INTRAVENOUS PRN
Status: DISCONTINUED | OUTPATIENT
Start: 2019-05-16 | End: 2019-05-18 | Stop reason: HOSPADM

## 2019-05-16 RX ORDER — ALBUMIN, HUMAN INJ 5% 5 %
SOLUTION INTRAVENOUS PRN
Status: DISCONTINUED | OUTPATIENT
Start: 2019-05-16 | End: 2019-05-16 | Stop reason: SDUPTHER

## 2019-05-16 RX ORDER — BUSPIRONE HYDROCHLORIDE 5 MG/1
30 TABLET ORAL 3 TIMES DAILY
Status: DISCONTINUED | OUTPATIENT
Start: 2019-05-16 | End: 2019-05-18 | Stop reason: HOSPADM

## 2019-05-16 RX ORDER — EPHEDRINE SULFATE 50 MG/ML
INJECTION INTRAVENOUS PRN
Status: DISCONTINUED | OUTPATIENT
Start: 2019-05-16 | End: 2019-05-16 | Stop reason: SDUPTHER

## 2019-05-16 RX ORDER — CLONIDINE HYDROCHLORIDE 0.1 MG/1
0.1 TABLET ORAL
Status: DISCONTINUED | OUTPATIENT
Start: 2019-05-16 | End: 2019-05-17

## 2019-05-16 RX ORDER — MIDAZOLAM HYDROCHLORIDE 1 MG/ML
INJECTION INTRAMUSCULAR; INTRAVENOUS PRN
Status: DISCONTINUED | OUTPATIENT
Start: 2019-05-16 | End: 2019-05-16 | Stop reason: SDUPTHER

## 2019-05-16 RX ORDER — METOLAZONE 2.5 MG/1
5 TABLET ORAL DAILY
Status: DISCONTINUED | OUTPATIENT
Start: 2019-05-16 | End: 2019-05-17

## 2019-05-16 RX ORDER — DEXAMETHASONE SODIUM PHOSPHATE 4 MG/ML
INJECTION, SOLUTION INTRA-ARTICULAR; INTRALESIONAL; INTRAMUSCULAR; INTRAVENOUS; SOFT TISSUE PRN
Status: DISCONTINUED | OUTPATIENT
Start: 2019-05-16 | End: 2019-05-16 | Stop reason: SDUPTHER

## 2019-05-16 RX ORDER — ACETAMINOPHEN 325 MG/1
650 TABLET ORAL EVERY 4 HOURS PRN
Status: DISCONTINUED | OUTPATIENT
Start: 2019-05-16 | End: 2019-05-18 | Stop reason: HOSPADM

## 2019-05-16 RX ORDER — SODIUM CHLORIDE 0.9 % (FLUSH) 0.9 %
10 SYRINGE (ML) INJECTION PRN
Status: DISCONTINUED | OUTPATIENT
Start: 2019-05-16 | End: 2019-05-18 | Stop reason: HOSPADM

## 2019-05-16 RX ORDER — HYDRALAZINE HYDROCHLORIDE 20 MG/ML
5 INJECTION INTRAMUSCULAR; INTRAVENOUS
Status: DISCONTINUED | OUTPATIENT
Start: 2019-05-16 | End: 2019-05-16

## 2019-05-16 RX ORDER — DIGOXIN 125 MCG
125 TABLET ORAL DAILY
Status: DISCONTINUED | OUTPATIENT
Start: 2019-05-16 | End: 2019-05-18 | Stop reason: HOSPADM

## 2019-05-16 RX ORDER — MEPERIDINE HYDROCHLORIDE 50 MG/ML
12.5 INJECTION INTRAMUSCULAR; INTRAVENOUS; SUBCUTANEOUS EVERY 5 MIN PRN
Status: DISCONTINUED | OUTPATIENT
Start: 2019-05-16 | End: 2019-05-16

## 2019-05-16 RX ORDER — ONDANSETRON 2 MG/ML
4 INJECTION INTRAMUSCULAR; INTRAVENOUS EVERY 6 HOURS PRN
Status: DISCONTINUED | OUTPATIENT
Start: 2019-05-16 | End: 2019-05-18 | Stop reason: HOSPADM

## 2019-05-16 RX ORDER — OXYCODONE HYDROCHLORIDE AND ACETAMINOPHEN 5; 325 MG/1; MG/1
2 TABLET ORAL PRN
Status: COMPLETED | OUTPATIENT
Start: 2019-05-16 | End: 2019-05-16

## 2019-05-16 RX ORDER — LABETALOL HYDROCHLORIDE 5 MG/ML
5 INJECTION, SOLUTION INTRAVENOUS EVERY 10 MIN PRN
Status: DISCONTINUED | OUTPATIENT
Start: 2019-05-16 | End: 2019-05-16

## 2019-05-16 RX ORDER — HEPARIN SODIUM 1000 [USP'U]/ML
INJECTION, SOLUTION INTRAVENOUS; SUBCUTANEOUS PRN
Status: DISCONTINUED | OUTPATIENT
Start: 2019-05-16 | End: 2019-05-16 | Stop reason: SDUPTHER

## 2019-05-16 RX ORDER — IPRATROPIUM BROMIDE AND ALBUTEROL SULFATE 2.5; .5 MG/3ML; MG/3ML
1 SOLUTION RESPIRATORY (INHALATION) ONCE
Status: CANCELLED | OUTPATIENT
Start: 2019-05-16

## 2019-05-16 RX ORDER — TORSEMIDE 100 MG/1
50 TABLET ORAL DAILY
Status: DISCONTINUED | OUTPATIENT
Start: 2019-05-16 | End: 2019-05-17

## 2019-05-16 RX ORDER — HYDROCODONE BITARTRATE AND ACETAMINOPHEN 5; 325 MG/1; MG/1
2 TABLET ORAL EVERY 4 HOURS PRN
Status: DISCONTINUED | OUTPATIENT
Start: 2019-05-16 | End: 2019-05-17

## 2019-05-16 RX ORDER — ASPIRIN 81 MG/1
81 TABLET ORAL DAILY
Status: DISCONTINUED | OUTPATIENT
Start: 2019-05-16 | End: 2019-05-18 | Stop reason: HOSPADM

## 2019-05-16 RX ORDER — ONDANSETRON 2 MG/ML
4 INJECTION INTRAMUSCULAR; INTRAVENOUS
Status: DISCONTINUED | OUTPATIENT
Start: 2019-05-16 | End: 2019-05-16

## 2019-05-16 RX ORDER — SODIUM CHLORIDE 9 MG/ML
INJECTION, SOLUTION INTRAVENOUS CONTINUOUS
Status: DISCONTINUED | OUTPATIENT
Start: 2019-05-16 | End: 2019-05-17

## 2019-05-16 RX ADMIN — Medication 1 G: at 10:47

## 2019-05-16 RX ADMIN — INSULIN GLARGINE 35 UNITS: 100 INJECTION, SOLUTION SUBCUTANEOUS at 22:51

## 2019-05-16 RX ADMIN — EPHEDRINE SULFATE 5 MG: 50 INJECTION INTRAVENOUS at 09:37

## 2019-05-16 RX ADMIN — PHENYLEPHRINE HYDROCHLORIDE 100 MCG: 10 INJECTION INTRAVENOUS at 10:53

## 2019-05-16 RX ADMIN — HYDROMORPHONE HYDROCHLORIDE 0.5 MG: 2 INJECTION INTRAMUSCULAR; INTRAVENOUS; SUBCUTANEOUS at 11:40

## 2019-05-16 RX ADMIN — EPHEDRINE SULFATE 5 MG: 50 INJECTION INTRAVENOUS at 09:18

## 2019-05-16 RX ADMIN — PROPOFOL 140 MG: 10 INJECTION, EMULSION INTRAVENOUS at 07:39

## 2019-05-16 RX ADMIN — ROCURONIUM BROMIDE 50 MG: 10 SOLUTION INTRAVENOUS at 07:39

## 2019-05-16 RX ADMIN — PHENYLEPHRINE HYDROCHLORIDE 100 MCG: 10 INJECTION INTRAVENOUS at 09:39

## 2019-05-16 RX ADMIN — PANTOPRAZOLE SODIUM 40 MG: 40 TABLET, DELAYED RELEASE ORAL at 18:01

## 2019-05-16 RX ADMIN — PHENYLEPHRINE HYDROCHLORIDE 100 MCG: 10 INJECTION INTRAVENOUS at 08:46

## 2019-05-16 RX ADMIN — ROCURONIUM BROMIDE 15 MG: 10 SOLUTION INTRAVENOUS at 08:23

## 2019-05-16 RX ADMIN — HYDROMORPHONE HYDROCHLORIDE 0.5 MG: 2 INJECTION INTRAMUSCULAR; INTRAVENOUS; SUBCUTANEOUS at 08:24

## 2019-05-16 RX ADMIN — MIDAZOLAM HYDROCHLORIDE 2 MG: 2 INJECTION, SOLUTION INTRAMUSCULAR; INTRAVENOUS at 07:33

## 2019-05-16 RX ADMIN — PHENYLEPHRINE HYDROCHLORIDE 100 MCG: 10 INJECTION INTRAVENOUS at 09:10

## 2019-05-16 RX ADMIN — SODIUM CHLORIDE, POTASSIUM CHLORIDE, SODIUM LACTATE AND CALCIUM CHLORIDE: 600; 310; 30; 20 INJECTION, SOLUTION INTRAVENOUS at 06:30

## 2019-05-16 RX ADMIN — POTASSIUM CHLORIDE 40 MEQ: 20 TABLET, EXTENDED RELEASE ORAL at 22:51

## 2019-05-16 RX ADMIN — SODIUM CHLORIDE, POTASSIUM CHLORIDE, SODIUM LACTATE AND CALCIUM CHLORIDE: 600; 310; 30; 20 INJECTION, SOLUTION INTRAVENOUS at 08:58

## 2019-05-16 RX ADMIN — HYDROMORPHONE HYDROCHLORIDE 0.5 MG: 2 INJECTION INTRAMUSCULAR; INTRAVENOUS; SUBCUTANEOUS at 08:12

## 2019-05-16 RX ADMIN — ALBUMIN (HUMAN) 250 ML: 12.5 INJECTION, SOLUTION INTRAVENOUS at 09:10

## 2019-05-16 RX ADMIN — ROCURONIUM BROMIDE 10 MG: 10 SOLUTION INTRAVENOUS at 10:18

## 2019-05-16 RX ADMIN — SODIUM CHLORIDE, POTASSIUM CHLORIDE, SODIUM LACTATE AND CALCIUM CHLORIDE: 600; 310; 30; 20 INJECTION, SOLUTION INTRAVENOUS at 07:34

## 2019-05-16 RX ADMIN — ROCURONIUM BROMIDE 20 MG: 10 SOLUTION INTRAVENOUS at 09:43

## 2019-05-16 RX ADMIN — LAMOTRIGINE 150 MG: 100 TABLET ORAL at 22:52

## 2019-05-16 RX ADMIN — PHENYLEPHRINE HYDROCHLORIDE 100 MCG: 10 INJECTION INTRAVENOUS at 08:57

## 2019-05-16 RX ADMIN — PHENYLEPHRINE HYDROCHLORIDE 100 MCG: 10 INJECTION INTRAVENOUS at 10:25

## 2019-05-16 RX ADMIN — INSULIN LISPRO 8 UNITS: 100 INJECTION, SOLUTION INTRAVENOUS; SUBCUTANEOUS at 18:06

## 2019-05-16 RX ADMIN — PHENYLEPHRINE HYDROCHLORIDE 100 MCG: 10 INJECTION INTRAVENOUS at 09:04

## 2019-05-16 RX ADMIN — ROCURONIUM BROMIDE 10 MG: 10 SOLUTION INTRAVENOUS at 08:57

## 2019-05-16 RX ADMIN — ONDANSETRON 4 MG: 2 INJECTION INTRAMUSCULAR; INTRAVENOUS at 08:00

## 2019-05-16 RX ADMIN — IPRATROPIUM BROMIDE AND ALBUTEROL SULFATE 1 AMPULE: .5; 3 SOLUTION RESPIRATORY (INHALATION) at 07:24

## 2019-05-16 RX ADMIN — SUGAMMADEX 200 MG: 100 INJECTION, SOLUTION INTRAVENOUS at 11:32

## 2019-05-16 RX ADMIN — INSULIN LISPRO 2 UNITS: 100 INJECTION, SOLUTION INTRAVENOUS; SUBCUTANEOUS at 22:50

## 2019-05-16 RX ADMIN — SODIUM CHLORIDE: 9 INJECTION, SOLUTION INTRAVENOUS at 18:01

## 2019-05-16 RX ADMIN — DEXAMETHASONE SODIUM PHOSPHATE 8 MG: 4 INJECTION, SOLUTION INTRAMUSCULAR; INTRAVENOUS at 07:39

## 2019-05-16 RX ADMIN — OXYCODONE HYDROCHLORIDE AND ACETAMINOPHEN 1 TABLET: 5; 325 TABLET ORAL at 15:51

## 2019-05-16 RX ADMIN — INSULIN LISPRO 15 UNITS: 100 INJECTION, SOLUTION INTRAVENOUS; SUBCUTANEOUS at 18:05

## 2019-05-16 RX ADMIN — SODIUM CHLORIDE, PRESERVATIVE FREE 10 ML: 5 INJECTION INTRAVENOUS at 22:53

## 2019-05-16 RX ADMIN — HYDROCODONE BITARTRATE AND ACETAMINOPHEN 2 TABLET: 5; 325 TABLET ORAL at 18:01

## 2019-05-16 RX ADMIN — HYDROMORPHONE HYDROCHLORIDE 0.5 MG: 1 INJECTION, SOLUTION INTRAMUSCULAR; INTRAVENOUS; SUBCUTANEOUS at 12:48

## 2019-05-16 RX ADMIN — HYDROCODONE BITARTRATE AND ACETAMINOPHEN 2 TABLET: 5; 325 TABLET ORAL at 22:51

## 2019-05-16 RX ADMIN — HEPARIN SODIUM 5000 UNITS: 1000 INJECTION, SOLUTION INTRAVENOUS; SUBCUTANEOUS at 09:46

## 2019-05-16 RX ADMIN — HYDROMORPHONE HYDROCHLORIDE 0.5 MG: 2 INJECTION INTRAMUSCULAR; INTRAVENOUS; SUBCUTANEOUS at 08:02

## 2019-05-16 RX ADMIN — ATORVASTATIN CALCIUM 80 MG: 80 TABLET, FILM COATED ORAL at 22:52

## 2019-05-16 RX ADMIN — INSULIN LISPRO 3 UNITS: 100 INJECTION, SOLUTION INTRAVENOUS; SUBCUTANEOUS at 12:20

## 2019-05-16 RX ADMIN — SACUBITRIL AND VALSARTAN 0.5 TABLET: 24; 26 TABLET, FILM COATED ORAL at 18:01

## 2019-05-16 RX ADMIN — LIDOCAINE HYDROCHLORIDE 50 MG: 20 INJECTION, SOLUTION INFILTRATION; PERINEURAL at 07:39

## 2019-05-16 RX ADMIN — FENTANYL CITRATE 100 MCG: 50 INJECTION INTRAMUSCULAR; INTRAVENOUS at 07:39

## 2019-05-16 RX ADMIN — SPIRONOLACTONE 100 MG: 100 TABLET, FILM COATED ORAL at 22:51

## 2019-05-16 RX ADMIN — BUPRENORPHINE HYDROCHLORIDE, NALOXONE HYDROCHLORIDE 2 FILM: 8; 2 FILM, SOLUBLE BUCCAL; SUBLINGUAL at 18:00

## 2019-05-16 RX ADMIN — EPHEDRINE SULFATE 5 MG: 50 INJECTION INTRAVENOUS at 10:25

## 2019-05-16 RX ADMIN — Medication 2 G: at 07:47

## 2019-05-16 RX ADMIN — DOXEPIN HYDROCHLORIDE 50 MG: 25 CAPSULE ORAL at 22:56

## 2019-05-16 RX ADMIN — PHENYLEPHRINE HYDROCHLORIDE 100 MCG: 10 INJECTION INTRAVENOUS at 07:39

## 2019-05-16 RX ADMIN — HYDROMORPHONE HYDROCHLORIDE 0.25 MG: 1 INJECTION, SOLUTION INTRAMUSCULAR; INTRAVENOUS; SUBCUTANEOUS at 15:06

## 2019-05-16 ASSESSMENT — PULMONARY FUNCTION TESTS
PIF_VALUE: 32
PIF_VALUE: 28
PIF_VALUE: 27
PIF_VALUE: 33
PIF_VALUE: 25
PIF_VALUE: 2
PIF_VALUE: 28
PIF_VALUE: 27
PIF_VALUE: 29
PIF_VALUE: 29
PIF_VALUE: 28
PIF_VALUE: 32
PIF_VALUE: 28
PIF_VALUE: 31
PIF_VALUE: 27
PIF_VALUE: 32
PIF_VALUE: 31
PIF_VALUE: 27
PIF_VALUE: 31
PIF_VALUE: 29
PIF_VALUE: 32
PIF_VALUE: 31
PIF_VALUE: 29
PIF_VALUE: 28
PIF_VALUE: 35
PIF_VALUE: 31
PIF_VALUE: 29
PIF_VALUE: 32
PIF_VALUE: 31
PIF_VALUE: 28
PIF_VALUE: 31
PIF_VALUE: 29
PIF_VALUE: 36
PIF_VALUE: 32
PIF_VALUE: 29
PIF_VALUE: 1
PIF_VALUE: 32
PIF_VALUE: 28
PIF_VALUE: 27
PIF_VALUE: 32
PIF_VALUE: 28
PIF_VALUE: 32
PIF_VALUE: 31
PIF_VALUE: 1
PIF_VALUE: 28
PIF_VALUE: 29
PIF_VALUE: 27
PIF_VALUE: 29
PIF_VALUE: 31
PIF_VALUE: 29
PIF_VALUE: 31
PIF_VALUE: 31
PIF_VALUE: 29
PIF_VALUE: 25
PIF_VALUE: 0
PIF_VALUE: 32
PIF_VALUE: 31
PIF_VALUE: 33
PIF_VALUE: 28
PIF_VALUE: 2
PIF_VALUE: 28
PIF_VALUE: 29
PIF_VALUE: 29
PIF_VALUE: 33
PIF_VALUE: 31
PIF_VALUE: 32
PIF_VALUE: 28
PIF_VALUE: 30
PIF_VALUE: 29
PIF_VALUE: 31
PIF_VALUE: 28
PIF_VALUE: 27
PIF_VALUE: 31
PIF_VALUE: 32
PIF_VALUE: 36
PIF_VALUE: 31
PIF_VALUE: 35
PIF_VALUE: 28
PIF_VALUE: 28
PIF_VALUE: 31
PIF_VALUE: 33
PIF_VALUE: 28
PIF_VALUE: 24
PIF_VALUE: 34
PIF_VALUE: 28
PIF_VALUE: 28
PIF_VALUE: 31
PIF_VALUE: 29
PIF_VALUE: 28
PIF_VALUE: 29
PIF_VALUE: 28
PIF_VALUE: 32
PIF_VALUE: 27
PIF_VALUE: 28
PIF_VALUE: 31
PIF_VALUE: 36
PIF_VALUE: 32
PIF_VALUE: 31
PIF_VALUE: 29
PIF_VALUE: 28
PIF_VALUE: 28
PIF_VALUE: 10
PIF_VALUE: 27
PIF_VALUE: 31
PIF_VALUE: 28
PIF_VALUE: 31
PIF_VALUE: 29
PIF_VALUE: 27
PIF_VALUE: 28
PIF_VALUE: 32
PIF_VALUE: 31
PIF_VALUE: 27
PIF_VALUE: 28
PIF_VALUE: 31
PIF_VALUE: 28
PIF_VALUE: 33
PIF_VALUE: 31
PIF_VALUE: 28
PIF_VALUE: 29
PIF_VALUE: 25
PIF_VALUE: 28
PIF_VALUE: 29
PIF_VALUE: 32
PIF_VALUE: 28
PIF_VALUE: 29
PIF_VALUE: 29
PIF_VALUE: 31
PIF_VALUE: 33
PIF_VALUE: 29
PIF_VALUE: 31
PIF_VALUE: 29
PIF_VALUE: 29
PIF_VALUE: 31
PIF_VALUE: 29
PIF_VALUE: 34
PIF_VALUE: 27
PIF_VALUE: 28
PIF_VALUE: 29
PIF_VALUE: 29
PIF_VALUE: 1
PIF_VALUE: 29
PIF_VALUE: 34
PIF_VALUE: 32
PIF_VALUE: 31
PIF_VALUE: 37
PIF_VALUE: 1
PIF_VALUE: 28
PIF_VALUE: 29
PIF_VALUE: 28
PIF_VALUE: 29
PIF_VALUE: 31
PIF_VALUE: 32
PIF_VALUE: 28
PIF_VALUE: 31
PIF_VALUE: 32
PIF_VALUE: 32
PIF_VALUE: 38
PIF_VALUE: 32
PIF_VALUE: 31
PIF_VALUE: 28
PIF_VALUE: 29
PIF_VALUE: 31
PIF_VALUE: 28
PIF_VALUE: 29
PIF_VALUE: 29
PIF_VALUE: 36
PIF_VALUE: 28
PIF_VALUE: 29
PIF_VALUE: 31
PIF_VALUE: 36
PIF_VALUE: 32
PIF_VALUE: 32
PIF_VALUE: 27
PIF_VALUE: 28
PIF_VALUE: 16
PIF_VALUE: 35
PIF_VALUE: 29
PIF_VALUE: 27
PIF_VALUE: 28
PIF_VALUE: 28
PIF_VALUE: 32
PIF_VALUE: 31
PIF_VALUE: 29
PIF_VALUE: 28
PIF_VALUE: 28
PIF_VALUE: 27
PIF_VALUE: 32
PIF_VALUE: 28
PIF_VALUE: 31
PIF_VALUE: 28
PIF_VALUE: 29
PIF_VALUE: 28
PIF_VALUE: 31
PIF_VALUE: 32
PIF_VALUE: 28
PIF_VALUE: 27
PIF_VALUE: 29
PIF_VALUE: 27
PIF_VALUE: 29
PIF_VALUE: 29
PIF_VALUE: 31
PIF_VALUE: 32
PIF_VALUE: 28
PIF_VALUE: 37
PIF_VALUE: 28
PIF_VALUE: 35
PIF_VALUE: 35
PIF_VALUE: 31
PIF_VALUE: 32
PIF_VALUE: 29
PIF_VALUE: 32
PIF_VALUE: 31
PIF_VALUE: 28
PIF_VALUE: 29
PIF_VALUE: 25
PIF_VALUE: 32
PIF_VALUE: 31
PIF_VALUE: 28
PIF_VALUE: 27
PIF_VALUE: 1
PIF_VALUE: 34
PIF_VALUE: 32
PIF_VALUE: 29
PIF_VALUE: 2
PIF_VALUE: 28
PIF_VALUE: 27

## 2019-05-16 ASSESSMENT — PAIN DESCRIPTION - FREQUENCY
FREQUENCY: CONTINUOUS
FREQUENCY: CONTINUOUS

## 2019-05-16 ASSESSMENT — PAIN SCALES - GENERAL
PAINLEVEL_OUTOF10: 8
PAINLEVEL_OUTOF10: 5
PAINLEVEL_OUTOF10: 7
PAINLEVEL_OUTOF10: 3
PAINLEVEL_OUTOF10: 8

## 2019-05-16 ASSESSMENT — PAIN DESCRIPTION - PAIN TYPE
TYPE: SURGICAL PAIN;CHRONIC PAIN
TYPE: ACUTE PAIN
TYPE: SURGICAL PAIN;CHRONIC PAIN
TYPE: CHRONIC PAIN;SURGICAL PAIN
TYPE: SURGICAL PAIN;CHRONIC PAIN
TYPE: CHRONIC PAIN;SURGICAL PAIN

## 2019-05-16 ASSESSMENT — PAIN DESCRIPTION - ONSET: ONSET: ON-GOING

## 2019-05-16 ASSESSMENT — PAIN DESCRIPTION - ORIENTATION
ORIENTATION: RIGHT

## 2019-05-16 ASSESSMENT — PAIN DESCRIPTION - DESCRIPTORS
DESCRIPTORS: ACHING;SHARP
DESCRIPTORS: ACHING
DESCRIPTORS: CONSTANT;TENDER
DESCRIPTORS: ACHING

## 2019-05-16 ASSESSMENT — PAIN - FUNCTIONAL ASSESSMENT
PAIN_FUNCTIONAL_ASSESSMENT: PREVENTS OR INTERFERES SOME ACTIVE ACTIVITIES AND ADLS
PAIN_FUNCTIONAL_ASSESSMENT: 0-10

## 2019-05-16 ASSESSMENT — PAIN DESCRIPTION - LOCATION
LOCATION: LEG
LOCATION: LEG;BACK
LOCATION: LEG;BACK
LOCATION: BACK;GROIN
LOCATION: BACK;LEG
LOCATION: BACK;LEG

## 2019-05-16 NOTE — PROGRESS NOTES
Patient admitted to room from PACU. Patient oriented to room, call light, bed rails, phone, lights and bathroom. Patient instructed about the schedule of the day including: vital sign frequency, lab draws, possible tests, frequency of MD and staff rounds, including RN/MD rounding together at bedside, daily weights, and I &O's. Patient instructed about prescribed diet, how to use 8MENU, and television. Telemetry box in place, patient aware of placement and reason. Bed locked, in lowest position, side rails up 2/4, call light within reach. Will continue to monitor.

## 2019-05-16 NOTE — ANESTHESIA PRE PROCEDURE
Department of Anesthesiology  Preprocedure Note       Name:  Hardy Heredia   Age:  52 y.o.  :  1965                                          MRN:  [id number]         Date:  2019      Surgeon: Carmel Chao):  Jose Carrasquillo MD    Procedure: RIGHT FEMORAL POPLITEAL ARTERY BYPASS GRAFT (Right )    Medications prior to admission:   Prior to Admission medications    Medication Sig Start Date End Date Taking?  Authorizing Provider   torsemide (DEMADEX) 100 MG tablet Take 0.5 tablets by mouth daily 19  Yes TRAY Gomez CNP   magnesium oxide (MAG-OX) 400 (240 Mg) MG tablet TAKE 1 TABLET ONCE DAILY 19  Yes Jorge Luis Easton MD   insulin glargine (LANTUS) 100 UNIT/ML injection vial Inject 35 Units into the skin 2 times daily 19  Yes Tommy Pennington MD   insulin lispro (HUMALOG KWIKPEN) 100 UNIT/ML pen Inject 15 Units into the skin 3 times daily (before meals) 19  Yes Tommy Pennington MD   Insulin Pen Needle (B-D ULTRAFINE III SHORT PEN) 31G X 8 MM MISC Five shots a day 19  Yes Tommy Pennington MD   metolazone (ZAROXOLYN) 5 MG tablet TAKE 1 TABLET BY MOUTH EVERY DAY 4/3/19  Yes TRAY Louise CNP   digoxin (LANOXIN) 125 MCG tablet Take 1 tablet by mouth daily 4/3/19  Yes TRAY Louise CNP   spironolactone (ALDACTONE) 100 MG tablet Take 1 tablet by mouth 2 times daily 3/13/19  Yes TRAY Bruner CNP   albuterol sulfate HFA (VENTOLIN HFA) 108 (90 Base) MCG/ACT inhaler Inhale 2 puffs into the lungs every 6 hours as needed for Wheezing or Shortness of Breath 3/6/19  Yes Melissa Mcarthur MD   sacubitril-valsartan (ENTRESTO) 24-26 MG per tablet Take 0.5 tablets by mouth every evening 19  Yes TRAY Louise CNP   potassium chloride (KLOR-CON M) 20 MEQ extended release tablet Take 2 tablets by mouth 3 times daily  Patient taking differently: Take 40 mEq by mouth 2 times daily  19  Yes TRAY Louise - LICHA   clopidogrel (PLAVIX) 75 MG tablet Take 1 tablet by mouth daily 1/31/19  Yes TRAY Wilson CNP   nystatin (MYCOSTATIN) 240418 UNIT/ML suspension Take 5 mLs by mouth 4 times daily 1/9/19  Yes TRAY Wilson CNP   tiotropium (SPIRIVA RESPIMAT) 2.5 MCG/ACT AERS inhaler Inhale 2 puffs into the lungs daily 12/4/18  Yes Poonam Bryan MD   carvedilol (COREG) 3.125 MG tablet Take 1 tablet by mouth 2 times daily (with meals) 11/28/18  Yes TRAY Wilson CNP   albuterol (PROVENTIL) (2.5 MG/3ML) 0.083% nebulizer solution Inhale 3 mLs into the lungs 7/5/18  Yes Historical Provider, MD   atorvastatin (LIPITOR) 80 MG tablet Take 80 mg by mouth nightly 5/2/18  Yes Historical Provider, MD   busPIRone (BUSPAR) 30 MG tablet Take 30 mg by mouth 3 times daily 4/2/18  Yes Historical Provider, MD   metFORMIN (GLUCOPHAGE-XR) 500 MG extended release tablet Take 1,000 mg by mouth 2 times daily 4/2/18  Yes Historical Provider, MD   buprenorphine-naloxone (SUBOXONE) 8-2 MG SUBL SL tablet Place 2 tablets under the tongue daily. .   Yes Historical Provider, MD   doxepin (SINEQUAN) 50 MG capsule Take 50 mg by mouth nightly   Yes Historical Provider, MD   aspirin EC 81 MG EC tablet Take 1 tablet by mouth daily 1/8/16  Yes Junior Octavio MD   pantoprazole (PROTONIX) 40 MG tablet Take 1 tablet by mouth daily  Patient taking differently: Take 40 mg by mouth 2 times daily  12/11/15  Yes Michelle Frankel MD   ARIPiprazole (ABILIFY) 10 MG tablet Take 5 mg by mouth daily    Yes Historical Provider, MD   lamoTRIgine (LAMICTAL) 150 MG tablet Take 150 mg by mouth 2 times daily    Yes Historical Provider, MD   nitroGLYCERIN (NITROSTAT) 0.4 MG SL tablet Place 0.4 mg under the tongue every 5 minutes as needed for Chest pain (times 3) up to max of 3 total doses. If no relief after 1 dose, call 911.     Historical Provider, MD       Current medications:    Current Facility-Administered Medications   Medication Dose Route Frequency Provider Last N17.0       Past Medical History:        Diagnosis Date    Anxiety and depression     CAD (coronary artery disease) 01/2018    Cardi[facility]     CHF (congestive heart failure) ([facility]     COPD (chronic obstructive pulmonary disease) ([facility]     Diabetes mellitus ([facility]     GERD (gastroesophageal reflux disease)     Hyperlipidemia     Hypertension     MI (myocardial infarction) ([facility]     Peripheral neuropathy     Peripheral vascular disease ([facility]     Pulmonary HTN ([facility]     Reflux     Sleep apnea     has never done sleep study;does not use CPAP    Wears glasses     for reading       Past Surgical History:        Procedure Laterality Date    ARTERIAL BYPASS SURGRY Left 01/06/2016    Axillary/Subclavian to Brachial Bypass w/reversed SVG    CARDIAC CATHETERIZATION  03/27/2018    Dr. Shayne Turk - [address] [facility] [address]      pancreatitis post surgery    CORONARY ANGIOPLASTY WITH STENT PLACEMENT  03/16/2018    MELOYD- 3.5 x 38 and 3.5 x 20 to Cx    CORONARY ANGIOPLASTY WITH STENT PLACEMENT  01/03/2018    MELODY- 3.0 x 38 and 2.75 x 26 and 2.75 x 8 and 2.75 x 22 to the LAD    INSERTABLE CARDIAC MONITOR  02/14/2019    CardioMEMs insertion for CHF    ROTATOR CUFF REPAIR Left 04/22/2016    TONSILLECTOMY      TUMOR EXCISION      benign tumors X 2 chest & axilla    UPPER GASTROINTESTINAL ENDOSCOPY  4/25/2013    BX barretts, HH, gastritis    UPPER GASTROINTESTINAL ENDOSCOPY  12/10/2015    UPPER GASTROINTESTINAL ENDOSCOPY  01/06/2017    Gastritis       Social History:    Social History     Tobacco Use    Smoking status: Current Every Day Smoker     Packs/day: 0.50     Years: 30.00     Pack years: 15.00     Types: Cigarettes    Smokeless tobacco: Never Used    Tobacco comment: pt states she \"quit on mothers day\"    Substance Use Topics    Alcohol use:  No                                Ready to quit: Not Answered  Counseling given: Not Answered  Comment: pt states she \"quit on mothers day\"       Vital Signs (Current):   Vitals:    05/13/19 1422 05/16/19 0605   BP:  105/64   Pulse:  95   Resp:  12   Temp:  98 °F (36.7 °C)   TempSrc:  Temporal   SpO2:  97%   Weight: 227 lb (103 kg) 228 lb (103.4 kg)   Height: 5' 4.5\" (1.638 m) 5' 4.5\" (1.638 m)                                              BP Readings from Last 3 Encounters:   05/16/19 105/64   05/10/19 100/68   05/08/19 (!) 90/58       NPO Status: Time of last liquid consumption: 2350                        Time of last solid consumption: 2230                        Date of last liquid consumption: 05/15/19                        Date of last solid food consumption: 05/15/19    BMI:   Wt Readings from Last 3 Encounters:   05/16/19 228 lb (103.4 kg)   05/10/19 233 lb (105.7 kg)   05/08/19 232 lb (105.2 kg)     Body mass index is 38.53 kg/m².     CBC:   Lab Results   Component Value Date    WBC 10.7 05/07/2019    RBC 4.50 05/07/2019    HGB 13.6 05/07/2019    HCT 40.6 05/07/2019    MCV 90.2 05/07/2019    RDW 14.6 05/07/2019     05/07/2019       CMP:   Lab Results   Component Value Date     05/15/2019    K 3.6 05/15/2019    K 3.5 04/19/2019    CL 82 05/15/2019    CO2 33 05/15/2019    BUN 25 05/15/2019    CREATININE 1.4 05/15/2019    GFRAA 47 05/15/2019    AGRATIO 1.1 05/07/2019    LABGLOM 39 05/15/2019    GLUCOSE 223 05/15/2019    PROT 6.9 05/07/2019    CALCIUM 9.3 05/15/2019    BILITOT 0.3 05/07/2019    ALKPHOS 96 05/07/2019    AST 11 05/07/2019    ALT 9 05/07/2019       POC Tests:   Recent Labs     05/16/19  0641   POCGLU 120*       Coags:   Lab Results   Component Value Date    PROTIME 11.3 02/14/2019    INR 0.99 02/14/2019    APTT 31.5 05/23/2018       HCG (If Applicable): No results found for: PREGTESTUR, PREGSERUM, HCG, HCGQUANT     ABGs: No results found for: PHART, PO2ART, RCX8VRU, ZRS0EJZ, BEART, E3VDYKUV     Type & Screen (If Applicable):  No results found for: [facility]    Anesthesia Evaluation    Airway: Mallampati: III  TM distance: >3 FB   Neck ROM: full  Mouth opening: > = 3 FB Dental:          Pulmonary:   (+) COPD:  sleep apnea:                             Cardiovascular:    (+) hypertension:, past MI:, CAD:, CABG/stent:, CHF:,                   Neuro/Psych:   (+) neuromuscular disease:,             GI/Hepatic/Renal:   (+) GERD:,           Endo/Other:    (+) Diabetes, . Abdominal:           Vascular:                                        Anesthesia Plan      general     ASA 3     (I discussed with the patient the risks and benefits of PIV, general anesthesia, IV Narcotics, PACU. All questions were answered the patient agrees with the plan.)  Induction: intravenous. Anesthetic plan and risks discussed with patient. Plan discussed with CRNA.                   Roberta Serrato MD   5/16/2019

## 2019-05-16 NOTE — PROGRESS NOTES
4 Eyes Skin Assessment     The patient is being assess for   Post-Op Surgical    I agree that 2 RN's have performed a thorough Head to Toe Skin Assessment on the patient. ALL assessment sites listed below have been assessed. Areas assessed by both nurses:   [x]   Head, Face, and Ears   [x]   Shoulders, Back, and Chest, Abdomen  [x]   Arms, Elbows, and Hands   [x]   Coccyx, Sacrum, and Ischium  [x]   Legs, Feet, and Heels          **SHARE this note so that the co-signing nurse is able to place an eSignature**    Co-signer eSignature: {Esignature:[id number]}    Does the Patient have Skin Breakdown?   {Blank single:01726::\"No\",\"Yes LDA WOUND CARE was Initiated documentation include the Shaye-wound, Wound Assessment, Measurements, Dressing Treatment, Drainage, and Color\",\"}          Vivek Prevention initiated:  {YES/NO:19732}   Wound Care Orders initiated:  {YES/NO:19732}      [facility] nurse consulted for Pressure Injury (Stage 3,4, Unstageable, DTI, NWPT, Complex wounds)and New or Established Ostomies:  No      Primary Nurse eSignature: Electronically signed by David Dash RN on 5/16/19 at 4:52 PM

## 2019-05-16 NOTE — H&P
I have reviewed the history and physical (See note dated 5/10/2019) and examined the patient and find no relevant changes. I have reviewed with the patient and/or family the risks, benefits, and alternatives to the procedure.

## 2019-05-17 PROBLEM — J44.9 COPD (CHRONIC OBSTRUCTIVE PULMONARY DISEASE) (HCC): Status: ACTIVE | Noted: 2019-05-17

## 2019-05-17 PROBLEM — I95.9 HYPOTENSION: Status: ACTIVE | Noted: 2019-05-17

## 2019-05-17 LAB
ANION GAP SERPL CALCULATED.3IONS-SCNC: 13 MMOL/L (ref 3–16)
BUN BLDV-MCNC: 21 MG/DL (ref 7–20)
CALCIUM SERPL-MCNC: 8.7 MG/DL (ref 8.3–10.6)
CHLORIDE BLD-SCNC: 87 MMOL/L (ref 99–110)
CO2: 31 MMOL/L (ref 21–32)
CREAT SERPL-MCNC: 1.2 MG/DL (ref 0.6–1.1)
ESTIMATED AVERAGE GLUCOSE: 223.1 MG/DL
GFR AFRICAN AMERICAN: 56
GFR NON-AFRICAN AMERICAN: 47
GLUCOSE BLD-MCNC: 172 MG/DL (ref 70–99)
GLUCOSE BLD-MCNC: 225 MG/DL (ref 70–99)
GLUCOSE BLD-MCNC: 227 MG/DL (ref 70–99)
GLUCOSE BLD-MCNC: 228 MG/DL (ref 70–99)
GLUCOSE BLD-MCNC: 250 MG/DL (ref 70–99)
HBA1C MFR BLD: 9.4 %
HCT VFR BLD CALC: 31.3 % (ref 36–48)
HCT VFR BLD CALC: 32.7 % (ref 36–48)
HEMOGLOBIN: 10.4 G/DL (ref 12–16)
HEMOGLOBIN: 10.9 G/DL (ref 12–16)
MCH RBC QN AUTO: 30.2 PG (ref 26–34)
MCHC RBC AUTO-ENTMCNC: 33.3 G/DL (ref 31–36)
MCV RBC AUTO: 90.6 FL (ref 80–100)
PDW BLD-RTO: 14.5 % (ref 12.4–15.4)
PERFORMED ON: ABNORMAL
PLATELET # BLD: 304 K/UL (ref 135–450)
PMV BLD AUTO: 8.1 FL (ref 5–10.5)
POTASSIUM SERPL-SCNC: 4 MMOL/L (ref 3.5–5.1)
RBC # BLD: 3.6 M/UL (ref 4–5.2)
SODIUM BLD-SCNC: 131 MMOL/L (ref 136–145)
TROPONIN: <0.01 NG/ML
WBC # BLD: 13.9 K/UL (ref 4–11)

## 2019-05-17 PROCEDURE — 83036 HEMOGLOBIN GLYCOSYLATED A1C: CPT

## 2019-05-17 PROCEDURE — 6370000000 HC RX 637 (ALT 250 FOR IP): Performed by: INTERNAL MEDICINE

## 2019-05-17 PROCEDURE — 97530 THERAPEUTIC ACTIVITIES: CPT

## 2019-05-17 PROCEDURE — 2580000003 HC RX 258: Performed by: INTERNAL MEDICINE

## 2019-05-17 PROCEDURE — 2580000003 HC RX 258: Performed by: SURGERY

## 2019-05-17 PROCEDURE — 85018 HEMOGLOBIN: CPT

## 2019-05-17 PROCEDURE — 84484 ASSAY OF TROPONIN QUANT: CPT

## 2019-05-17 PROCEDURE — 93930 UPPER EXTREMITY STUDY: CPT

## 2019-05-17 PROCEDURE — 6370000000 HC RX 637 (ALT 250 FOR IP): Performed by: SURGERY

## 2019-05-17 PROCEDURE — 6370000000 HC RX 637 (ALT 250 FOR IP): Performed by: ANESTHESIOLOGY

## 2019-05-17 PROCEDURE — 94640 AIRWAY INHALATION TREATMENT: CPT

## 2019-05-17 PROCEDURE — 82728 ASSAY OF FERRITIN: CPT

## 2019-05-17 PROCEDURE — 85027 COMPLETE CBC AUTOMATED: CPT

## 2019-05-17 PROCEDURE — 93005 ELECTROCARDIOGRAM TRACING: CPT | Performed by: INTERNAL MEDICINE

## 2019-05-17 PROCEDURE — 85014 HEMATOCRIT: CPT

## 2019-05-17 PROCEDURE — 97166 OT EVAL MOD COMPLEX 45 MIN: CPT

## 2019-05-17 PROCEDURE — 83540 ASSAY OF IRON: CPT

## 2019-05-17 PROCEDURE — 36415 COLL VENOUS BLD VENIPUNCTURE: CPT

## 2019-05-17 PROCEDURE — 83550 IRON BINDING TEST: CPT

## 2019-05-17 PROCEDURE — 80048 BASIC METABOLIC PNL TOTAL CA: CPT

## 2019-05-17 PROCEDURE — 97535 SELF CARE MNGMENT TRAINING: CPT

## 2019-05-17 PROCEDURE — 2060000000 HC ICU INTERMEDIATE R&B

## 2019-05-17 PROCEDURE — 6360000002 HC RX W HCPCS: Performed by: SURGERY

## 2019-05-17 RX ORDER — SPIRONOLACTONE 25 MG/1
50 TABLET ORAL DAILY
Status: DISCONTINUED | OUTPATIENT
Start: 2019-05-18 | End: 2019-05-18 | Stop reason: HOSPADM

## 2019-05-17 RX ORDER — FUROSEMIDE 10 MG/ML
20 INJECTION INTRAMUSCULAR; INTRAVENOUS ONCE
Status: COMPLETED | OUTPATIENT
Start: 2019-05-17 | End: 2019-05-17

## 2019-05-17 RX ORDER — 0.9 % SODIUM CHLORIDE 0.9 %
250 INTRAVENOUS SOLUTION INTRAVENOUS ONCE
Status: DISCONTINUED | OUTPATIENT
Start: 2019-05-17 | End: 2019-05-18 | Stop reason: HOSPADM

## 2019-05-17 RX ORDER — SODIUM CHLORIDE 9 MG/ML
INJECTION, SOLUTION INTRAVENOUS
Status: DISPENSED
Start: 2019-05-17 | End: 2019-05-18

## 2019-05-17 RX ORDER — BUTALBITAL, ACETAMINOPHEN AND CAFFEINE 50; 325; 40 MG/1; MG/1; MG/1
1 TABLET ORAL EVERY 4 HOURS PRN
Status: DISCONTINUED | OUTPATIENT
Start: 2019-05-17 | End: 2019-05-18 | Stop reason: HOSPADM

## 2019-05-17 RX ORDER — SPIRONOLACTONE 25 MG/1
50 TABLET ORAL 2 TIMES DAILY
Status: DISCONTINUED | OUTPATIENT
Start: 2019-05-18 | End: 2019-05-17

## 2019-05-17 RX ORDER — CYCLOBENZAPRINE HCL 10 MG
5 TABLET ORAL 3 TIMES DAILY PRN
Status: DISCONTINUED | OUTPATIENT
Start: 2019-05-17 | End: 2019-05-18 | Stop reason: HOSPADM

## 2019-05-17 RX ADMIN — ONDANSETRON 4 MG: 2 INJECTION INTRAMUSCULAR; INTRAVENOUS at 18:12

## 2019-05-17 RX ADMIN — DOXEPIN HYDROCHLORIDE 50 MG: 25 CAPSULE ORAL at 23:37

## 2019-05-17 RX ADMIN — TORSEMIDE 50 MG: 100 TABLET ORAL at 08:59

## 2019-05-17 RX ADMIN — DIGOXIN 125 MCG: 125 TABLET ORAL at 09:01

## 2019-05-17 RX ADMIN — HYDROCODONE BITARTRATE AND ACETAMINOPHEN 2 TABLET: 5; 325 TABLET ORAL at 05:09

## 2019-05-17 RX ADMIN — SACUBITRIL AND VALSARTAN 0.5 TABLET: 24; 26 TABLET, FILM COATED ORAL at 17:02

## 2019-05-17 RX ADMIN — BUTALBITAL, ACETAMINOPHEN, AND CAFFEINE 1 TABLET: 50; 325; 40 TABLET ORAL at 19:55

## 2019-05-17 RX ADMIN — LAMOTRIGINE 150 MG: 100 TABLET ORAL at 23:36

## 2019-05-17 RX ADMIN — METOLAZONE 5 MG: 2.5 TABLET ORAL at 08:59

## 2019-05-17 RX ADMIN — FUROSEMIDE 20 MG: 10 INJECTION, SOLUTION INTRAMUSCULAR; INTRAVENOUS at 08:58

## 2019-05-17 RX ADMIN — LAMOTRIGINE 150 MG: 100 TABLET ORAL at 09:02

## 2019-05-17 RX ADMIN — CLOPIDOGREL BISULFATE 75 MG: 75 TABLET ORAL at 08:59

## 2019-05-17 RX ADMIN — SODIUM CHLORIDE, PRESERVATIVE FREE 10 ML: 5 INJECTION INTRAVENOUS at 23:38

## 2019-05-17 RX ADMIN — INSULIN GLARGINE 35 UNITS: 100 INJECTION, SOLUTION SUBCUTANEOUS at 09:02

## 2019-05-17 RX ADMIN — BUSPIRONE HYDROCHLORIDE 30 MG: 5 TABLET ORAL at 01:05

## 2019-05-17 RX ADMIN — BUSPIRONE HYDROCHLORIDE 30 MG: 5 TABLET ORAL at 08:58

## 2019-05-17 RX ADMIN — INSULIN LISPRO 15 UNITS: 100 INJECTION, SOLUTION INTRAVENOUS; SUBCUTANEOUS at 17:05

## 2019-05-17 RX ADMIN — BUPRENORPHINE HYDROCHLORIDE, NALOXONE HYDROCHLORIDE 2 FILM: 8; 2 FILM, SOLUBLE BUCCAL; SUBLINGUAL at 09:01

## 2019-05-17 RX ADMIN — INSULIN LISPRO 15 UNITS: 100 INJECTION, SOLUTION INTRAVENOUS; SUBCUTANEOUS at 09:15

## 2019-05-17 RX ADMIN — SODIUM CHLORIDE, PRESERVATIVE FREE 10 ML: 5 INJECTION INTRAVENOUS at 09:04

## 2019-05-17 RX ADMIN — HYDROCODONE BITARTRATE AND ACETAMINOPHEN 1 TABLET: 5; 325 TABLET ORAL at 17:02

## 2019-05-17 RX ADMIN — ASPIRIN 81 MG: 81 TABLET, COATED ORAL at 08:59

## 2019-05-17 RX ADMIN — INSULIN LISPRO 2 UNITS: 100 INJECTION, SOLUTION INTRAVENOUS; SUBCUTANEOUS at 17:05

## 2019-05-17 RX ADMIN — BUSPIRONE HYDROCHLORIDE 30 MG: 5 TABLET ORAL at 14:08

## 2019-05-17 RX ADMIN — HYDROCODONE BITARTRATE AND ACETAMINOPHEN 1 TABLET: 5; 325 TABLET ORAL at 10:37

## 2019-05-17 RX ADMIN — INSULIN LISPRO 4 UNITS: 100 INJECTION, SOLUTION INTRAVENOUS; SUBCUTANEOUS at 11:29

## 2019-05-17 RX ADMIN — PANTOPRAZOLE SODIUM 40 MG: 40 TABLET, DELAYED RELEASE ORAL at 08:59

## 2019-05-17 RX ADMIN — TIOTROPIUM BROMIDE 18 MCG: 18 CAPSULE ORAL; RESPIRATORY (INHALATION) at 09:03

## 2019-05-17 RX ADMIN — INSULIN LISPRO 6 UNITS: 100 INJECTION, SOLUTION INTRAVENOUS; SUBCUTANEOUS at 09:15

## 2019-05-17 RX ADMIN — INSULIN LISPRO 15 UNITS: 100 INJECTION, SOLUTION INTRAVENOUS; SUBCUTANEOUS at 11:29

## 2019-05-17 RX ADMIN — SPIRONOLACTONE 100 MG: 100 TABLET, FILM COATED ORAL at 09:01

## 2019-05-17 RX ADMIN — BUSPIRONE HYDROCHLORIDE 30 MG: 5 TABLET ORAL at 23:36

## 2019-05-17 RX ADMIN — PANTOPRAZOLE SODIUM 40 MG: 40 TABLET, DELAYED RELEASE ORAL at 17:02

## 2019-05-17 RX ADMIN — ARIPIPRAZOLE 5 MG: 5 TABLET ORAL at 08:59

## 2019-05-17 RX ADMIN — ATORVASTATIN CALCIUM 80 MG: 80 TABLET, FILM COATED ORAL at 23:36

## 2019-05-17 RX ADMIN — INSULIN GLARGINE 35 UNITS: 100 INJECTION, SOLUTION SUBCUTANEOUS at 23:37

## 2019-05-17 RX ADMIN — INSULIN LISPRO 1 UNITS: 100 INJECTION, SOLUTION INTRAVENOUS; SUBCUTANEOUS at 23:38

## 2019-05-17 RX ADMIN — POTASSIUM CHLORIDE 40 MEQ: 20 TABLET, EXTENDED RELEASE ORAL at 08:59

## 2019-05-17 ASSESSMENT — PAIN SCALES - GENERAL
PAINLEVEL_OUTOF10: 8
PAINLEVEL_OUTOF10: 8
PAINLEVEL_OUTOF10: 5
PAINLEVEL_OUTOF10: 0
PAINLEVEL_OUTOF10: 8
PAINLEVEL_OUTOF10: 8
PAINLEVEL_OUTOF10: 10
PAINLEVEL_OUTOF10: 10
PAINLEVEL_OUTOF10: 6
PAINLEVEL_OUTOF10: 8

## 2019-05-17 ASSESSMENT — PAIN DESCRIPTION - ORIENTATION
ORIENTATION: LOWER;RIGHT
ORIENTATION: RIGHT
ORIENTATION: RIGHT
ORIENTATION: LOWER;RIGHT

## 2019-05-17 ASSESSMENT — PAIN DESCRIPTION - LOCATION
LOCATION: GROIN
LOCATION: GROIN
LOCATION: BACK;GROIN
LOCATION: BACK;GROIN

## 2019-05-17 ASSESSMENT — PAIN DESCRIPTION - PAIN TYPE
TYPE: CHRONIC PAIN;SURGICAL PAIN
TYPE: CHRONIC PAIN

## 2019-05-17 NOTE — ANESTHESIA POSTPROCEDURE EVALUATION
Department of Anesthesiology  Postprocedure Note    Patient: Filemon Juarez  MRN: [id number]  YOB: 1965  Date of evaluation: 5/17/2019  Time:  10:50 AM     Procedure Summary     Date:  05/16/19 Room / Location:  [place] [address] ([facility]) / [facility]    Anesthesia Start:  9202 Anesthesia Stop:  8955    Procedure:  RIGHT FEMORAL POPLITEAL ARTERY BYPASS GRAFT (Right Leg Lower) Diagnosis:       Peripheral vascular disease, unspecified ([facility] [address]      (PERIPHERAL VASCULAR DISEASE)    Surgeon: Aixa Pizarro MD Responsible Provider:  Twila Duran MD    Anesthesia Type:  general ASA Status:  3          Anesthesia Type: general    Scarlett Phase I: Scarlett Score: 7    Scarlett Phase II:      Last vitals: Reviewed and per EMR flowsheets.        Anesthesia Post Evaluation    Patient location during evaluation: PACU  Level of consciousness: awake and alert  Airway patency: patent  Nausea & Vomiting: no nausea and no vomiting  Complications: no  Cardiovascular status: blood pressure returned to baseline  Respiratory status: acceptable  Hydration status: euvolemic  Comments: Postoperative Anesthesia Note    Name:    Filemon Juarez  MRN:      [id number]    Patient Vitals in the past 12 hrs:  05/17/19 1006, BP:94/63  05/17/19 1000, BP:94/63, Pulse:99  05/17/19 0945, BP:(!) 73/40  05/17/19 0838, BP:95/66  05/17/19 0830, BP:(!) 78/54, Temp:97.6 °F (36.4 °C), Temp src:Oral, Pulse:97, Resp:18, SpO2:96 %  05/17/19 0515, BP:(!) 91/59, Temp:98.7 °F (37.1 °C), Temp src:Oral, Pulse:88, Resp:18, SpO2:98 %  05/17/19 0441, Weight:235 lb (106.6 kg)  05/17/19 0100, BP:95/63, Temp:98.1 °F (36.7 °C), Temp src:Oral, Pulse:88, Resp:18, SpO2:94 %     LABS:    CBC  Lab Results       Component                Value               Date/Time                  WBC                      13.9 (H)            05/17/2019 05:03 AM        HGB                      10.9 (L)            05/17/2019 05:03 AM        HCT                      32.7 (L)

## 2019-05-17 NOTE — PROGRESS NOTES
Treatment Regimen? Yes     Does the patient have everything they need prior to discharge? Yes     Comments: chart reviewed    Plan of Care: cont home regimen    Electronically signed by Leanne Paez RCP on 5/16/2019 at 9:51 PM    Respiratory Protocol Guidelines     1. Assessment and treatment by Respiratory Therapy will be initiated for medication and therapeutic interventions upon initiation of aerosolized medication. 2. Physician will be contacted for respiratory rate (RR) greater than 35 breaths per minute. Therapy will be held for heart rate (HR) greater than 140 beats per minute, pending direction from physician. 3. Bronchodilators will be administered via Metered Dose Inhaler (MDI) with spacer when the following criteria are met:  a. Alert and cooperative     b. HR < 140 bpm  c. RR < 30 bpm                d. Can demonstrate a 2-3 second inspiratory hold  4. Bronchodilators will be administered via Hand Held Nebulizer [facility]) to patients when ANY of the following criteria are met  a. Incognizant or uncooperative          b. Patients treated with HHN at Home        c. Unable to demonstrate proper use of MDI with spacer     d. RR > 30 bpm   5. Bronchodilators will be delivered via Metered Dose Inhaler (MDI), HHN, Aerogen to intubated patients on mechanical ventilation. 6. Inhalation medication orders will be delivered and/or substituted as outlined below. Aerosolized Medications Ordering and Administration Guidelines:    1. All Medications will be ordered by a physician, and their frequency and/or modality will be adjusted as defined by the patients Respiratory Severity Index (RSI) score. 2. If the patient does not have documented COPD, consider discontinuing anticholinergics when RSI is less than 9.  3. If the bronchospasm worsens (increased RSI), then the bronchodilator frequency can be increased to a maximum of every 4 hours.   If greater than every 4 hours is required, the physician will be contacted. 4. If the bronchospasm improves, the frequency of the bronchodilator can be decreased, based on the patient's RSI, but not less than home treatment regimen frequency. 5. Bronchodilator(s) will be discontinued if patient has a RSI less than 9 and has received no scheduled or as needed treatment for 72  Hrs. Patients Ordered on a Mucolytic Agent:    1. Must always be administered with a bronchodilator. 2. Discontinue if patient experiences worsened bronchospasm, or secretions have lessened to the point that the patient is able to clear them with a cough. Anti-inflammatory and Combination Medications:    1. If the patient lacks prior history of lung disease, is not using inhaled anti-inflammatory medication at home, and lacks wheezing by examination or by history for at least 24 hours, contact physician for possible discontinuation.

## 2019-05-17 NOTE — OP NOTE
[facility]                                OPERATIVE REPORT    PATIENT NAME: Karlene BADILLO                    :        1965  MED REC NO:   [id number]                          ROOM:  ACCOUNT NO:   [de-identified]                           ADMIT DATE: 2019  PROVIDER:     Belkis Oviedo MD    DATE OF PROCEDURE:  2019    PREOPERATIVE DIAGNOSIS:  Severe right lower extremity claudication. POSTOPERATIVE DIAGNOSIS:  Severe right lower extremity claudication. PROCEDURE:  Right femoral to popliteal bypass with reverse greater  saphenous vein. ANESTHESIA:  General endotracheal.    INDICATIONS:  The patient is a 49-year-old female who was undergone  prior bilateral lower extremity interventions. These have failed. The  patient has severe very short distance claudication particularly of the  right lower extremity. She is brought to the operating room at this  time to undergo femoral popliteal bypass. PROCEDURE:  The patient was brought to the operating room, placed in a  supine position. General endotracheal anesthesia induced. After  adequate anesthesia, the right lower extremity was prepped and draped in  sterile fashion. An oblique incision was made in the right groin  retracting significant panniculus cephalad. The incision was carried  down through the subcutaneous tissues. The greater saphenous vein was  identified and traced back to the saphenofemoral junction. All side  branches at this site were ligated using 3-0 silk ties and hemoclips and  divided. Next the femoral artery was dissected. The superficial  femoral artery was palpated. I traced this back up to the femoral  bifurcation which unfortunately was right at the level of the inguinal  ligament retracting the inguinal ligament cephalad, allowed some  exposure the common femoral artery.   The profunda vessel was also  dissected flushed with  heparinized saline solution. It was marked on its anterior surface and  then pulled through the Galen tunneling device which was then removed. Attention was then directed to the right groin. The profunda vessel was  punctured at its origin with a 21-gauge needle and a guidewire was  passed retrograde in the common femoral artery and a 5-Slovak introducer  sheath was then passed into the common femoral artery. A 5-Slovak  Darinel occlusion catheter was then placed through the sheath into the  proximal common femoral artery underneath the inguinal ligament and  inflated occluding inflow. The vessel loop around the profunda branch  distally was constricted occluding flow. A longitudinal arteriotomy was  made starting at the puncture site and extended into the distal common  femoral artery. The vein was cut to the appropriate length and angle. It was then sewn to the common femoral artery and origin of the profunda  using running 6-0 Prolene suture. After appropriate backbleeding and  flushing, the occlusion catheter was removed and the sutures were tied  establishing flow through the bypass graft and into the native profunda  vessel. The graft was then punctured with a 20-gauge angiocatheter. Angiograms were performed showing widely patent bypass graft and widely  patent distal anastomosis with good flow into the popliteal vessel  filling the tibial vessels with predominant flow into the posterior  tibial artery. With satisfactory angiogram, the angiocatheter was  removed and the small puncture site oversewn using a 6-0 Prolene  figure-of-eight suture. All operative sites were irrigated with  antibiotic saline solution and closed in multiple layers using running  2-0 Vicryl suture, 3-0 Vicryl suture and then running 4-0 Monocryl  subcuticular sutures to reapproximate the skin edges. The groin  incision was dressed with a PREVENA negative pressure wound dressing.    The remainder of the

## 2019-05-17 NOTE — FLOWSHEET NOTE
05/16/19 2019   Assessment   Charting Type Shift assessment   Neurological   Neuro (WDL) WDL   Level of Consciousness 0   Orientation Level Oriented X4   Cognition Appropriate judgement; Appropriate safety awareness; Appropriate attention/concentration; Appropriate for developmental age; Follows commands   Language Clear   RUE Motor Response Responds to command;Normal extension;Normal flexion   LUE Motor Response Responds to command;Normal extension;Normal flexion   RLE Motor Response Responds to command;Normal extension;Normal flexion   LLE Motor Response Responds to command;Normal extension;Normal flexion   [place] Coma Scale   Eye Opening 4   Best Verbal Response 5   Best Motor Response 6   [place] Coma Scale Score 15   HEENT   HEENT (WDL) X   Right Eye Impaired vision   Left Eye Impaired vision   Tongue Pink & moist   Voice Normal   Mucous Membrane Pink;Moist   Teeth Dentures upper;Dentures lower   Respiratory   Respiratory (WDL) X   Respiratory Pattern Regular   Respiratory Depth Normal   Respiratory Quality/Effort Unlabored   Chest Assessment Chest expansion symmetrical;Trachea midline   Breath Sounds   Right Upper Lobe Expiratory Wheezes   Right Middle Lobe Expiratory Wheezes   Right Lower Lobe Diminished   Left Upper Lobe Expiratory Wheezes   Left Lower Lobe Diminished   Cardiac   Cardiac (WDL) WDL   Cardiac Regularity Regular   Heart Sounds S1, S2   Cardiac Rhythm NSR   Rhythm Interpretation   Pulse 98   Cardiac Monitor   Telemetry Monitor On Yes   Telemetry Audible Yes   Telemetry Alarms Set Yes   Telemetry Box Number 15   Gastrointestinal   Abdominal (WDL) X   Abdomen Inspection Rounded; Soft   Tenderness Soft; No guarding;Nontender; No rebound   RUQ Bowel Sounds Active   LUQ Bowel Sounds Active   RLQ Bowel Sounds Active   LLQ Bowel Sounds Active   Peripheral Vascular   Peripheral Vascular (WDL) X   Edema Right lower extremity; Left lower extremity   RLE Edema +1;Pitting   LLE Edema +1;Pitting   RUE

## 2019-05-17 NOTE — PROGRESS NOTES
Physical Therapy: Attempt at 1053 . Hold per RN due to BP of 83/59 supine. Will monitor and see pt when she is able to tolerate therapy. 2nd attempt at [phone], Pt remains on hold due to low BP per RN.     [facility] PT [phone]

## 2019-05-17 NOTE — PROGRESS NOTES
[address] [facility] or Facility: MARLEEN From: Ginette Pill RE: Irineo Colon 1965 RM: [phone] Pt has been complaining of an extremely bad headache all day. I have given her Norco and it has not helped it is started in the back of her neck and is working her way up to the front of her temples. She is getting nauseated also. She would like something different for her headache because the medication is no helping her. Her BPs are running very soft I think it is more of a migraine type headache she has had them them before. Please review and advise.  Thank you so much Ginette Pill Need Callback: NO CALLBACK REQ C4 PROGRESSIVE CARE

## 2019-05-17 NOTE — CARE COORDINATION
CASE MANAGEMENT INITIAL ASSESSMENT      Reviewed chart and met with patient today, re: Possible d/c needs   Explained Case Management role/services. Family present: None     Admit date/status: 5/16/19   Diagnosis:  MA Vein Bypass     Insurance: Nellie 28 required for SNF - Y        3 night stay required -  N    Living arrangements, Adls, care needs, prior to admission: Pt lives at home independetly with her daughter and friend     Transportation: Family     Durable Medical Equipment at home: Walker_X_Cane__RTS__ BSC__Shower Chair__  02__ HHN__ CPAP__  [facility] Bed__ W/C___ Other__________    Services in the home and/or outpatient, prior to admission: None     Dialysis Facility (if applicable) N/A   · Name:  · Address:  · Dialysis Schedule:  · Phone:  · Fax:    PT/OT recs: Pending     Hospital Exemption Notification (HEN): N/A     Barriers to discharge: None     Plan/comments: 5/17/19 Pt denies any needs, has incisional wound vac that will be removed in 's office during follow up appointment, no dressing changes needed, no needs for vac.  Follow for PT/OT orders     ECOC on chart for MD signature

## 2019-05-17 NOTE — PROGRESS NOTES
Occupational Therapy   Occupational Therapy Initial Assessment  Date: 2019   Patient Name: Miquel Zapata  MRN: [id number]     : 1965    Date of Service: 2019    Discharge Recommendations:  Home with assist PRN  OT Equipment Recommendations  Equipment Needed: No    Assessment   Performance deficits / Impairments: Decreased functional mobility ; Decreased high-level IADLs;Decreased ADL status; Decreased endurance;Decreased strength;Decreased coordination  Assessment: Pt agreeable to therapy. Pt currently requiring Min A/CGA for functional transfers/mobility using a RW. Pt with low BP throughout session limiting activity tolerance this session. Pt pain with all movement also limiting function, and requiring increased time for all functional mobility. RN aware of pts BP and in room at end of session. According to PLOF pt below functional baseline and presenting with the above performance deficits. Pt will benefit from continued skilled OT services. Prognosis: Good  Decision Making: Medium Complexity  Patient Education: role of OT, safety, ADLs, functional mobility  REQUIRES OT FOLLOW UP: Yes  Activity Tolerance  Activity Tolerance: Patient Tolerated treatment well;Treatment limited secondary to medical complications (free text); Patient limited by pain  Activity Tolerance: Pt asymptomatic throughout session. Sitting EOB BP=92/60, following mobility supine in bed BP=86/56. RN aware and in room. Safety Devices  Safety Devices in place: Yes  Type of devices: Gait belt;Left in bed;Nurse notified;Call light within reach         Patient Diagnosis(es): There were no encounter diagnoses.      has a past medical history of Anxiety and depression, CAD (coronary artery disease), Cardiomyopathy ([facility] [address] CHF (congestive heart failure) ([facility] [address] COPD (chronic obstructive pulmonary disease) ([facility] [address] Diabetes mellitus ([facility] [address] GERD (gastroesophageal reflux disease), Hyperlipidemia, Hypertension, MI (myocardial infarction) [facility]), Peripheral neuropathy, Peripheral vascular disease ([facility] [address] Pulmonary HTN ([facility] [address] Reflux, Sleep apnea, and Wears glasses. has a past surgical history that includes Tonsillectomy; Cholecystectomy; tumor excision; Upper gastrointestinal endoscopy (4/25/2013); Upper gastrointestinal endoscopy (12/10/2015); Upper gastrointestinal endoscopy (01/06/2017); Arterial bypass surgry (Left, 01/06/2016); Cardiac catheterization (03/27/2018); Coronary angioplasty with stent (03/16/2018); Rotator cuff repair (Left, 04/22/2016); Coronary angioplasty with stent (01/03/2018); Insertable Cardiac Monitor (02/14/2019); and femoral bypass (Right, 5/16/2019).        Restrictions  Restrictions/Precautions  Restrictions/Precautions: General Precautions, Up as Tolerated  Position Activity Restriction  Other position/activity restrictions: roche    Subjective   General  Chart Reviewed: Yes  Patient assessed for rehabilitation services?: Yes  Referring Practitioner: Sunshine Hamlin MD  Diagnosis: claudication of peripheral vascular disease  General Comment  Comments: Per RN wali for therapy  Oxygen Therapy  SpO2: 94 %  Social/Functional History  Social/Functional History  Lives With: Friend(s)  Type of Home: Trailer  Home Layout: One level, Able to Live on Main level with bedroom/bathroom, Performs ADL's on one level  Home Access: Stairs to enter with rails  Entrance Stairs - Number of Steps: 5  Entrance Stairs - Rails: Both  Bathroom Shower/Tub: Tub/Shower unit  Bathroom Toilet: Standard  Home Equipment: Standard walker  ADL Assistance: Independent  Homemaking Assistance: Independent  Homemaking Responsibilities: Yes  Ambulation Assistance: Independent( )  Transfer Assistance: Independent  Active : Yes  Type of occupation: not working due to health  Leisure & Hobbies: watch tv     Objective   Vision: Impaired  Vision Exceptions: Wears glasses at all times  Hearing: Within functional limits    Orientation  Overall Orientation Status: Within Functional Limits     Balance  Sitting Balance: Stand by assistance  Standing Balance: Minimal assistance  Standing Balance  Time: ~3-4 min  Activity: bathroom mobility, in room mobility; standing grooming  Comment: using RW  Functional Mobility  Functional - Mobility Device: Rolling Walker  Activity: To/from bathroom; Other  Assist Level: Minimal assistance  Functional Mobility Comments: min-CGA for balance  Toilet Transfers  Toilet - Technique: Ambulating  Equipment Used: Grab bars(standard toilet height)  Toilet Transfer: Minimal assistance  Toilet Transfers Comments: RW  ADL  Grooming: Minimal assistance(stood sinkside to wash hands using RW)  UE Dressing: Minimal assistance(management of gown)  LE Dressing: Maximum assistance(assist to cross LLE; pt donned L sock.  Pt unable to cross leg or don R sock; seated EOB)  Toileting: Minimal assistance(management of clothing; pt completed pericare)  Tone RUE  RUE Tone: Normotonic  Tone LUE  LUE Tone: Normotonic  Coordination  Movements Are Fluid And Coordinated: Yes     Bed mobility  Supine to Sit: Stand by assistance  Sit to Supine: Minimal assistance(BLE management)  Scooting: (toward HOB; supine)  Transfers  Sit to stand: Minimal assistance  Stand to sit: Minimal assistance  Transfer Comments: using RW     Cognition  Overall Cognitive Status: WFL     Sensation  Overall Sensation Status: WFL      LUE AROM (degrees)  LUE AROM : WFL  Left Hand AROM (degrees)  Left Hand AROM: WFL  RUE AROM (degrees)  RUE AROM : WFL  Right Hand AROM (degrees)  Right Hand AROM: WFL  LUE Strength  Gross LUE Strength: WFL  RUE Strength  Gross RUE Strength: WFL      Plan   Plan  Times per week: 3-5x/wk  Current Treatment Recommendations: Strengthening, ROM, Safety Education & Training, Balance Training, Patient/Caregiver Education & Training, Self-Care / ADL, Functional Mobility Training, Endurance Training    AM-PAC Score     AM-PAC Inpatient Daily Activity Raw Score: 18  AM-PAC Inpatient ADL T-Scale Score : 38.66  ADL Inpatient CMS 0-100% Score: 46.65  ADL Inpatient CMS G-Code Modifier : CK    Goals  Short term goals  Time Frame for Short term goals: within one week (5/24)  Short term goal 1: Pt will complete standard toilet transfer (S) by 5/21  Short term goal 2: Pt will complete 3 standing ADLs using LRAD (S)  Short term goal 3: Pt will complete UB AROM therapeutic exercise x10-15 reps   Patient Goals   Patient goals : pt did not state     Therapy Time   Individual Concurrent Group Co-treatment   Time In 0913         Time Out 0954         Minutes 41         Timed Code Treatment Minutes: 31 Minutes(10 min eval)       Georges Abel OTR/L

## 2019-05-18 VITALS
BODY MASS INDEX: 39.92 KG/M2 | WEIGHT: 239.6 LBS | HEART RATE: 102 BPM | RESPIRATION RATE: 18 BRPM | OXYGEN SATURATION: 93 % | DIASTOLIC BLOOD PRESSURE: 61 MMHG | SYSTOLIC BLOOD PRESSURE: 94 MMHG | HEIGHT: 65 IN | TEMPERATURE: 99.5 F

## 2019-05-18 LAB
EKG ATRIAL RATE: 103 BPM
EKG DIAGNOSIS: NORMAL
EKG P AXIS: 68 DEGREES
EKG P-R INTERVAL: 184 MS
EKG Q-T INTERVAL: 364 MS
EKG QRS DURATION: 140 MS
EKG QTC CALCULATION (BAZETT): 476 MS
EKG R AXIS: -56 DEGREES
EKG T AXIS: 60 DEGREES
EKG VENTRICULAR RATE: 103 BPM
FERRITIN: 99.2 NG/ML (ref 15–150)
GLUCOSE BLD-MCNC: 228 MG/DL (ref 70–99)
GLUCOSE BLD-MCNC: 270 MG/DL (ref 70–99)
IRON SATURATION: 14 % (ref 15–50)
IRON: 44 UG/DL (ref 37–145)
PERFORMED ON: ABNORMAL
PERFORMED ON: ABNORMAL
TOTAL IRON BINDING CAPACITY: 324 UG/DL (ref 260–445)

## 2019-05-18 PROCEDURE — 2580000003 HC RX 258: Performed by: SURGERY

## 2019-05-18 PROCEDURE — 6370000000 HC RX 637 (ALT 250 FOR IP): Performed by: SURGERY

## 2019-05-18 PROCEDURE — 6360000002 HC RX W HCPCS: Performed by: SURGERY

## 2019-05-18 PROCEDURE — 99024 POSTOP FOLLOW-UP VISIT: CPT | Performed by: SURGERY

## 2019-05-18 PROCEDURE — 6370000000 HC RX 637 (ALT 250 FOR IP): Performed by: NURSE PRACTITIONER

## 2019-05-18 PROCEDURE — 94640 AIRWAY INHALATION TREATMENT: CPT

## 2019-05-18 PROCEDURE — 93010 ELECTROCARDIOGRAM REPORT: CPT | Performed by: INTERNAL MEDICINE

## 2019-05-18 RX ORDER — HYDROCODONE BITARTRATE AND ACETAMINOPHEN 5; 325 MG/1; MG/1
1 TABLET ORAL ONCE
Status: COMPLETED | OUTPATIENT
Start: 2019-05-18 | End: 2019-05-18

## 2019-05-18 RX ADMIN — SODIUM CHLORIDE, PRESERVATIVE FREE 10 ML: 5 INJECTION INTRAVENOUS at 08:41

## 2019-05-18 RX ADMIN — ASPIRIN 81 MG: 81 TABLET, COATED ORAL at 08:36

## 2019-05-18 RX ADMIN — TIOTROPIUM BROMIDE 18 MCG: 18 CAPSULE ORAL; RESPIRATORY (INHALATION) at 08:12

## 2019-05-18 RX ADMIN — BUPRENORPHINE HYDROCHLORIDE, NALOXONE HYDROCHLORIDE 2 FILM: 8; 2 FILM, SOLUBLE BUCCAL; SUBLINGUAL at 08:38

## 2019-05-18 RX ADMIN — INSULIN LISPRO 4 UNITS: 100 INJECTION, SOLUTION INTRAVENOUS; SUBCUTANEOUS at 11:48

## 2019-05-18 RX ADMIN — ARIPIPRAZOLE 5 MG: 5 TABLET ORAL at 08:36

## 2019-05-18 RX ADMIN — HYDROCODONE BITARTRATE AND ACETAMINOPHEN 1 TABLET: 5; 325 TABLET ORAL at 04:06

## 2019-05-18 RX ADMIN — BUSPIRONE HYDROCHLORIDE 30 MG: 5 TABLET ORAL at 08:36

## 2019-05-18 RX ADMIN — LAMOTRIGINE 150 MG: 100 TABLET ORAL at 08:36

## 2019-05-18 RX ADMIN — INSULIN LISPRO 6 UNITS: 100 INJECTION, SOLUTION INTRAVENOUS; SUBCUTANEOUS at 08:42

## 2019-05-18 RX ADMIN — INSULIN LISPRO 15 UNITS: 100 INJECTION, SOLUTION INTRAVENOUS; SUBCUTANEOUS at 08:43

## 2019-05-18 RX ADMIN — INSULIN LISPRO 15 UNITS: 100 INJECTION, SOLUTION INTRAVENOUS; SUBCUTANEOUS at 11:48

## 2019-05-18 RX ADMIN — INSULIN GLARGINE 35 UNITS: 100 INJECTION, SOLUTION SUBCUTANEOUS at 08:42

## 2019-05-18 RX ADMIN — PANTOPRAZOLE SODIUM 40 MG: 40 TABLET, DELAYED RELEASE ORAL at 08:35

## 2019-05-18 RX ADMIN — DIGOXIN 125 MCG: 125 TABLET ORAL at 08:36

## 2019-05-18 RX ADMIN — CLOPIDOGREL BISULFATE 75 MG: 75 TABLET ORAL at 08:36

## 2019-05-18 ASSESSMENT — PAIN SCALES - GENERAL
PAINLEVEL_OUTOF10: 5
PAINLEVEL_OUTOF10: 5

## 2019-05-18 ASSESSMENT — PAIN DESCRIPTION - LOCATION: LOCATION: GROIN

## 2019-05-18 ASSESSMENT — PAIN DESCRIPTION - ORIENTATION: ORIENTATION: LOWER;RIGHT

## 2019-05-18 ASSESSMENT — PAIN DESCRIPTION - PAIN TYPE
TYPE: CHRONIC PAIN;SURGICAL PAIN
TYPE: ACUTE PAIN

## 2019-05-18 NOTE — PROGRESS NOTES
Pt d/c'd home. Removed perph IV and stopped bleeding. Catheter intact. Pt tolerated well. No redness noted at site. Notified CMU and removed tele box. Reviewed d/c instructions, home meds, and  f/u information utilizing teach-back method. Patient verbalized understanding of discharge instructions and the need to follow up with Dr. Stephenie Tyler next Friday in the office. Patient off the floor in wheelchair with writer.

## 2019-05-18 NOTE — PLAN OF CARE
Problem: Falls - Risk of:  Goal: Will remain free from falls  Description  Will remain free from falls  Outcome: Ongoing     Problem: Falls - Risk of:  Goal: Absence of physical injury  Description  Absence of physical injury  Outcome: Ongoing     Patient instructed to call if assistance is needed, Call light within reach and patient understands how to use.

## 2019-05-18 NOTE — PROGRESS NOTES
Raúl Marroquin, NP paged. \"pt BP has remained 90s/60s throughout the night even after 250mL bolus. she has gained almost 5lbs since yesterday. pain now 5/10 at surgical site, head, neck and chronic lower back. can she get her Norco added back?  \"

## 2019-05-18 NOTE — PROGRESS NOTES
Ace wrap and kerlix changed on R leg. Pt tolerated well. Incision is clean, dry and intact. Old drainage was noted on mid leg incision. No odor noted. Pt states groin is very tender. Will continue to monitor.

## 2019-05-18 NOTE — PROGRESS NOTES
Rounding on Pt, Crying c/o \" Right leg hurts, and my neck\" rated 10/10. Attending RN notified about Pt c/o. CNP made aware.  ALIE

## 2019-05-18 NOTE — PROGRESS NOTES
Hospitalist Progress Note    Date of Admission: 5/16/2019    Chief Complaint: Hypotension    Hospital Course:   52 y.o. female who we are asked to see/evaluate by Aixa Pizarro MD for medical management of blood pressure and headache. Patient had femoral popliteal bypass yesterday for right lower extremity claudication. Her systolic blood pressure has been fluctuating widely from 70s to  130 today. She does not have chest pain, shortness of breath, dizziness, syncope, change in mental status, nausea, vomiting, fever or diarrhea. She does have headache which started today. More over for head and it goes to the back. No visual changes or any focal neurological symptoms. He has some cold and congestion. No ear pain or any swallowing difficulties. She just had regular diet/dinner  and holding. .  Complain nausea at time to time. Subjective: Doing well. Denies leg pain. Labs:   Recent Labs     05/17/19  0503 05/17/19  2134   WBC 13.9*  --    HGB 10.9* 10.4*   HCT 32.7* 31.3*     --      Recent Labs     05/17/19  0503   *   K 4.0   CL 87*   CO2 31   BUN 21*   CREATININE 1.2*   CALCIUM 8.7     No results for input(s): AST, ALT, BILIDIR, BILITOT, ALKPHOS in the last 72 hours. No results for input(s): INR in the last 72 hours. Physical Exam Performed:    BP 94/61   Pulse 102   Temp 99.5 °F (37.5 °C) (Oral) Comment: patient eating food  Resp 18   Ht 5' 4.5\" (1.638 m)   Wt 239 lb 9.6 oz (108.7 kg)   LMP 10/24/2012   SpO2 93%   BMI 40.49 kg/m²   General appearance: No apparent distress, appears stated age and cooperative. Obese complaining headache  HEENT: Normal cephalic, atraumatic without obvious deformity. Pupils equal, round, and reactive to light. Extra ocular muscles intact. Conjunctivae/corneas clear. Neck: Supple, with full range of motion. No jugular venous distention. Trachea midline. No neck stiffness  Respiratory:  Normal respiratory effort.   Reduced air entry,

## 2019-05-18 NOTE — PROGRESS NOTES
POD #2 right fem pop bypass  Patient comfortable and ambulating independently  Dressings dry and intact  Right foot well perfused  Home today, Tylenol for prn pain  Follow up with Dr. Santy George Friday

## 2019-05-18 NOTE — FLOWSHEET NOTE
05/17/19 2006   Assessment   Charting Type Shift assessment   Neurological   Neuro (WDL) WDL   Level of Consciousness 0   Orientation Level Oriented X4   Cognition Appropriate judgement; Appropriate safety awareness; Appropriate attention/concentration; Appropriate for developmental age; Follows commands   Language Clear   RUE Motor Response Responds to command;Normal extension;Normal flexion   LUE Motor Response Responds to command;Normal extension;Normal flexion   RLE Motor Response Responds to command;Normal extension;Normal flexion   LLE Motor Response Responds to command;Normal extension;Normal flexion   HEENT   HEENT (WDL) X   Right Eye Impaired vision   Left Eye Impaired vision   Tongue Pink & moist   Voice Normal   Mucous Membrane Pink;Moist   Teeth Dentures upper;Dentures lower   Respiratory   Respiratory (WDL) X   Respiratory Pattern Regular   Respiratory Depth Normal   Respiratory Quality/Effort Unlabored   Chest Assessment Chest expansion symmetrical;Trachea midline   Breath Sounds   Right Upper Lobe Inspiratory Wheezes   Right Middle Lobe Diminished   Right Lower Lobe Diminished   Left Upper Lobe Diminished   Left Lower Lobe Diminished   Cardiac   Cardiac (WDL) WDL   Cardiac Regularity Regular   Heart Sounds S1, S2   Cardiac Rhythm NSR   Cardiac Monitor   Telemetry Monitor On Yes   Telemetry Audible Yes   Telemetry Alarms Set Yes   Telemetry Box Number 15   Gastrointestinal   Abdominal (WDL) X   Abdomen Inspection Rounded; Soft   Tenderness Soft; No guarding;Nontender; No rebound   RUQ Bowel Sounds Active   LUQ Bowel Sounds Active   RLQ Bowel Sounds Active   LLQ Bowel Sounds Active   Peripheral Vascular   Peripheral Vascular (WDL) X   Edema Right lower extremity; Left lower extremity   RLE Edema Trace   LLE Edema +1;Pitting   RUE Neurovascular Assessment   Capillary Refill Less than/equal to 3 seconds   Color Appropriate for ethnicity   Temperature Warm   Sensation RUE Full sensation   R Radial Pulse +1 LUE Neurovascular Assessment   Capillary Refill Less than/equal to 3 seconds   Color Appropriate for ethnicity   Temperature Warm   Sensation LUE Full sensation   L Radial Pulse +1   RLE Neurovascular Assessment   Capillary Refill Less than/equal to 3 seconds   Color Red   Temperature Warm   Sensation RLE Full sensation   R Pedal Pulse +1   LLE Neurovascular Assessment   Capillary Refill Less than/equal to 3 seconds   Color Red   Temperature Warm   Sensation LLE Full sensation   L Pedal Pulse +1   Skin Color/Condition   Skin Color/Condition (WDL) WDL   Skin Color Appropriate for ethnicity   Skin Condition/Temp Warm;Dry   Skin Integrity   Skin Integrity (WDL) X   Skin Integrity Redness  (blanchable)   Location BLE   Multiple Skin Integrity Sites Yes   Skin Integrity Site 2   Skin Integrity Location 2 Other (Comment)  (healing wound)   Location 2 R foot   Skin Integrity Site 3   Skin Integrity Location 3 Other (Comment)  (peeling)    Location 3 R great toe   Musculoskeletal   Musculoskeletal (WDL) X   RUE Full movement   LUE Full movement   RL Extremity Full movement;Weakness;Surgery   LL Extremity Full movement;Weakness   Genitourinary   Genitourinary (WDL) X  (roche)   Flank Tenderness No   Suprapubic Tenderness No   Dysuria GEORGE   Anus/Rectum   Anus/Rectum (WDL) WDL   Negative Pressure Wound Therapy Groin Right   Placement Date/Time: 05/16/19 1107   Pre-existing: No  Location: Groin  Wound Location Orientation: Right   Wound Type Surgical   Dressing Type Black foam   Dressing Status Clean;Dry; Intact   Drainage Amount None   Wound Assessment GEORGE  (dressing in place)   Shaye-wound Assessment GEORGE  (dressing in place)   Odor None   Incision 05/16/19 Leg Right   Date First Assessed/Time First Assessed: 05/16/19 1107   Location: Leg  Wound Location Orientation: Right   Wound Assessment GEORGE  (dressing in place)   Shaye-wound Assessment GEORGE  (dressing in place)   Closure Other (Comment)  (foam; wound vac)   Drainage Amount None   Odor None   Dressing/Treatment Tegaderm; Foam   Dressing Status Clean;Dry; Intact   Incision 05/16/19 Groin Right   Date First Assessed/Time First Assessed: 05/16/19 1107   Location: Groin  Wound Location Orientation: Right   Wound Assessment GEORGE  (dressing in place)   Shaye-wound Assessment GEORGE  (dressing in place)   Drainage Amount None   Odor None   Dressing/Treatment Vacuum dressing   Dressing Status Clean;Dry; Intact   Psychosocial   Psychosocial (WDL) WDL

## 2019-05-20 NOTE — DISCHARGE SUMMARY
Physician Discharge Summary     Patient ID:  Mellisa Shrestha  [id number]  73 y.o.  1965    Admit date: 5/16/2019    Discharge date and time: 5/18/2019  1:17 PM     Admitting Physician: Bianca Domingo MD     Discharge Physician: same    Admission Diagnoses: Claudication in peripheral vascular disease [facility]) [I73.9]    Discharge Diagnoses: same    Admission Condition: good    Discharged Condition: good    Indication for Admission: Admitted to undergo R Femoral to Popliteal arery bypass. Prior R SFA stet which is occluded. Hospital Course: Admitted, taken to OR where above performed. She did have low BP post-op likely secondary to acute blood loss and diuresis. BP meds held and BP improved.       Consults: Hospital Medicine      Disposition: home    In process/preliminary results:  Outstanding Order Results     Date and Time Order Name Status Description    5/16/2019 1600 FL LESS THAN 1 HOUR In process           Patient Instructions:   Discharge Medication List as of 5/18/2019 12:26 PM      CONTINUE these medications which have NOT CHANGED    Details   torsemide (DEMADEX) 100 MG tablet Take 0.5 tablets by mouth daily, Disp-30 tablet, R-3Adjust Sig      magnesium oxide (MAG-OX) 400 (240 Mg) MG tablet TAKE 1 TABLET ONCE DAILY, Disp-30 tablet, R-11Normal      insulin glargine (LANTUS) 100 UNIT/ML injection vial Inject 35 Units into the skin 2 times daily, Disp-3 vial, R-3Normal      insulin lispro (HUMALOG KWIKPEN) 100 UNIT/ML pen Inject 15 Units into the skin 3 times daily (before meals), Disp-5 pen, R-3Normal      metolazone (ZAROXOLYN) 5 MG tablet TAKE 1 TABLET BY MOUTH EVERY DAY, Disp-30 tablet, R-1Normal      digoxin (LANOXIN) 125 MCG tablet Take 1 tablet by mouth daily, Disp-30 tablet, R-3Normal      spironolactone (ALDACTONE) 100 MG tablet Take 1 tablet by mouth 2 times daily, Disp-60 tablet, R-5Normal      albuterol sulfate HFA (VENTOLIN HFA) 108 (90 Base) MCG/ACT inhaler Inhale 2 puffs into the lungs every 6 hours as needed for Wheezing or Shortness of Breath, Disp-1 Inhaler, R-6Normal      sacubitril-valsartan (ENTRESTO) 24-26 MG per tablet Take 0.5 tablets by mouth every evening, Disp-60 tablet, R-0NO PRINT      potassium chloride (KLOR-CON M) 20 MEQ extended release tablet Take 2 tablets by mouth 3 times daily, Disp-180 tablet, R-3Normal      clopidogrel (PLAVIX) 75 MG tablet Take 1 tablet by mouth daily, Disp-30 tablet, R-5Normal      tiotropium (SPIRIVA RESPIMAT) 2.5 MCG/ACT AERS inhaler Inhale 2 puffs into the lungs daily, Disp-1 Inhaler, R-6Normal      carvedilol (COREG) 3.125 MG tablet Take 1 tablet by mouth 2 times daily (with meals), Disp-60 tablet, R-5Normal      atorvastatin (LIPITOR) 80 MG tablet Take 80 mg by mouth nightlyHistorical Med      busPIRone (BUSPAR) 30 MG tablet Take 30 mg by mouth 3 times dailyHistorical Med      metFORMIN (GLUCOPHAGE-XR) 500 MG extended release tablet Take 1,000 mg by mouth 2 times dailyHistorical Med      buprenorphine-naloxone (SUBOXONE) 8-2 MG SUBL SL tablet Place 2 tablets under the tongue daily. Lencho Ellis Historical Med      doxepin (SINEQUAN) 50 MG capsule Take 50 mg by mouth nightlyHistorical Med      aspirin EC 81 MG EC tablet Take 1 tablet by mouth daily, Disp-30 tablet, R-3      pantoprazole (PROTONIX) 40 MG tablet Take 1 tablet by mouth daily, Disp-30 tablet, R-0      ARIPiprazole (ABILIFY) 10 MG tablet Take 5 mg by mouth daily Historical Med      lamoTRIgine (LAMICTAL) 150 MG tablet Take 150 mg by mouth 2 times daily Historical Med      Insulin Pen Needle (B-D ULTRAFINE III SHORT PEN) 31G X 8 MM MISC Disp-200 each, R-2, NormalFive shots a day         STOP taking these medications       nystatin (MYCOSTATIN) 138824 UNIT/ML suspension Comments:   Reason for Stopping:         albuterol (PROVENTIL) (2.5 MG/3ML) 0.083% nebulizer solution Comments:   Reason for Stopping:         nitroGLYCERIN (NITROSTAT) 0.4 MG SL tablet Comments:   Reason for Stopping: Activity: activity as tolerated  Diet: diabetic diet  Wound Care: keep wound clean and dry    Follow-up with Dr Robert Whalen in 1 week. Signed:   Nick Lovett MD  5/20/2019  2:35 PM

## 2019-05-21 ENCOUNTER — TELEPHONE (OUTPATIENT)
Dept: CARDIOLOGY | Age: 54
End: 2019-05-21

## 2019-05-21 NOTE — TELEPHONE ENCOUNTER
Patient states she had a bypass on her right leg last Thursday and is having trouble moving around. Patient said she will do her best to try to send it in. Also, patient is upset because she has had the cardiomems in since 02/14/2019 and has never heard anything at all about her readings. She thought that it was supposed to help with letting you know when she had the swelling in her legs before it got bad. She would like to talk to you also.

## 2019-05-22 NOTE — TELEPHONE ENCOUNTER
Spoke with patient. Voiced understanding. She will take the torsemide 50 mg BID x 2 days and will continue to cardiomem transmissions.

## 2019-05-22 NOTE — TELEPHONE ENCOUNTER
Please call patient, thanks for the transmission. Pressures are elevated. I would like for her to take her torsemide 50mg BID for 2 days. Continue the cardiomems transmissions. [employer]PD[employer] returns tomorrow.

## 2019-05-24 ENCOUNTER — OFFICE VISIT (OUTPATIENT)
Dept: VASCULAR SURGERY | Age: 54
End: 2019-05-24

## 2019-05-24 VITALS
BODY MASS INDEX: 38.82 KG/M2 | DIASTOLIC BLOOD PRESSURE: 60 MMHG | HEIGHT: 65 IN | WEIGHT: 233 LBS | SYSTOLIC BLOOD PRESSURE: 100 MMHG

## 2019-05-24 DIAGNOSIS — Z09 POSTOP CHECK: Primary | ICD-10-CM

## 2019-05-24 PROCEDURE — 99024 POSTOP FOLLOW-UP VISIT: CPT | Performed by: SURGERY

## 2019-05-28 ENCOUNTER — TELEPHONE (OUTPATIENT)
Dept: CARDIOLOGY CLINIC | Age: 54
End: 2019-05-28

## 2019-05-28 RX ORDER — METOLAZONE 5 MG/1
TABLET ORAL
Qty: 90 TABLET | Refills: 3 | Status: ON HOLD | OUTPATIENT
Start: 2019-05-28 | End: 2019-10-01 | Stop reason: SDUPTHER

## 2019-05-28 NOTE — TELEPHONE ENCOUNTER
Reviewed CardioMEMS readings. PAD still running mid 30's. She reports her BP was 88/40. Still with edema and shortness of breath but stable. complains of leg pain. Recommended to continue torsemide 50 mg bid in addition to spironolactone 100 mg bid and metolazone 5 mg daily. She repeated instructions and voiced understanding.    Juan Daniel Colon, APRN - CNP

## 2019-05-28 NOTE — TELEPHONE ENCOUNTER
SRJ Pt  Last office visit 5/8/2019  Plan   1. Smoking cessation discussed  2. Discussed peripheral angioplasty versus surgery, consensus is for bypass surgery, r/b/a/o d/w pt and family, understand/agree  3. Referral to Dr. Andriy Vo, he is planning on seeing her 5/10/19 as add on to his schedule  4. Follow up with Dr Ruth Esteves [phone] for pneumobilia  5.  Follow up in 3 months

## 2019-05-30 ENCOUNTER — TELEPHONE (OUTPATIENT)
Dept: VASCULAR SURGERY | Age: 54
End: 2019-05-30

## 2019-05-30 ENCOUNTER — HOSPITAL ENCOUNTER (EMERGENCY)
Age: 54
Discharge: HOME | End: 2019-05-30
Attending: EMERGENCY MEDICINE
Payer: COMMERCIAL

## 2019-05-30 VITALS
WEIGHT: 233 LBS | OXYGEN SATURATION: 98 % | DIASTOLIC BLOOD PRESSURE: 57 MMHG | TEMPERATURE: 98.3 F | RESPIRATION RATE: 16 BRPM | SYSTOLIC BLOOD PRESSURE: 93 MMHG | HEART RATE: 90 BPM | BODY MASS INDEX: 39.78 KG/M2 | HEIGHT: 64 IN

## 2019-05-30 DIAGNOSIS — T14.8XXA WOUND INFECTION: Primary | ICD-10-CM

## 2019-05-30 DIAGNOSIS — L08.9 WOUND INFECTION: Primary | ICD-10-CM

## 2019-05-30 PROCEDURE — 99283 EMERGENCY DEPT VISIT LOW MDM: CPT

## 2019-05-30 RX ORDER — CEPHALEXIN 500 MG/1
500 CAPSULE ORAL 4 TIMES DAILY
Qty: 40 CAPSULE | Refills: 0 | Status: ON HOLD | OUTPATIENT
Start: 2019-05-30 | End: 2019-06-03 | Stop reason: HOSPADM

## 2019-05-30 ASSESSMENT — PAIN DESCRIPTION - PAIN TYPE: TYPE: ACUTE PAIN

## 2019-05-30 ASSESSMENT — PAIN DESCRIPTION - LOCATION: LOCATION: LEG

## 2019-05-30 ASSESSMENT — PAIN DESCRIPTION - DESCRIPTORS: DESCRIPTORS: TIGHTNESS;THROBBING

## 2019-05-30 ASSESSMENT — PAIN DESCRIPTION - ORIENTATION: ORIENTATION: RIGHT

## 2019-05-30 ASSESSMENT — PAIN SCALES - GENERAL: PAINLEVEL_OUTOF10: 8

## 2019-05-30 ASSESSMENT — ENCOUNTER SYMPTOMS
SHORTNESS OF BREATH: 0
BACK PAIN: 0
ABDOMINAL PAIN: 0

## 2019-05-30 NOTE — ED NOTES
Patient provided with wound care instructions, wet to dry dressings and instructions for oral abx and verbalized understanding.      Gregory Cespedes RN  05/30/19 [id number]

## 2019-05-30 NOTE — TELEPHONE ENCOUNTER
Patient called stating that has area on right thigh incision that opened up about size of pinky. I told her may want to see her tomorrow but would check with DR Sunshine Hamlin. She states that she is going to [facility] get it cleaned out and packed. Per Dr Sunshine Hamlin just clean and pack and cover. Return to see Dr Sunshine Hamlin as regular apt for May 14, 2019. pamlr

## 2019-05-30 NOTE — ED PROVIDER NOTES
This 57-year-old white female who is a heavy smoker  She's also a insulin-dependent diabetic  She has history of obstructive sleep apnea  History of hypertensive disease  History of a severe vasculopathic condition  She has had some graphs before that she had a right SFA stent which became totally occluded  On May 16 Dr. Kiera Monique vascular surgeon performed a right femoral popliteal artery bypass surgery on her this was doing pretty good until a couple days ago when she noted that the proximal surgical incision seemed to D has been up about an inch and a half in length there was no severe drainage but does smell bad  She denies any new distal pain she says her pain is about 8/10 but it's been that way postoperatively since the time of surgery  The swelling also she says it is no worse than it has been no  She is on a blood thinner, Plavix    The history is provided by the patient. Review of Systems   Constitutional: Positive for activity change and chills. Negative for appetite change, fatigue and fever. Respiratory: Negative for shortness of breath. Cardiovascular: Negative for chest pain. Gastrointestinal: Negative for abdominal pain. Musculoskeletal: Positive for gait problem. Negative for back pain, joint swelling, myalgias, neck pain and neck stiffness. Skin: Positive for wound. Right femoral and right popliteal area   Neurological: Negative for dizziness. Psychiatric/Behavioral: Negative for behavioral problems. All other systems reviewed and are negative. Physical Exam   Constitutional: She is oriented to person, place, and time. She appears well-developed and well-nourished. No distress. HENT:   Head: Normocephalic. Right Ear: External ear normal.   Left Ear: External ear normal.   Eyes: Pupils are equal, round, and reactive to light. Conjunctivae and EOM are normal.   Neck: Normal range of motion. Neck supple. No thyromegaly present.    Cardiovascular: Normal rate, regular rhythm, normal heart sounds and intact distal pulses. Exam reveals no gallop and no friction rub. No murmur heard. Pulmonary/Chest: Effort normal and breath sounds normal. No respiratory distress. Abdominal: Soft. Bowel sounds are normal. She exhibits no distension. There is no tenderness. Musculoskeletal: She exhibits edema and tenderness. Right hip: She exhibits tenderness and swelling. She exhibits no bony tenderness and no deformity. Legs:  Neurological: She is alert and oriented to person, place, and time. She displays normal reflexes. No cranial nerve deficit or sensory deficit. She exhibits normal muscle tone. Coordination normal. GCS eye subscore is 4. GCS verbal subscore is 5. GCS motor subscore is 6. Skin: She is not diaphoretic. There is erythema. Psychiatric: She has a normal mood and affect. Her behavior is normal.   Nursing note and vitals reviewed. Procedures    MDM         Labs      Radiology      EKG Interpretation.      Past Medical History:   Diagnosis Date    Anxiety and depression     CAD (coronary artery disease) 01/2018    Cardiomyopathy ([address]     CHF (congestive heart failure) (HCC)     COPD (chronic obstructive pulmonary disease) (HCC)     Diabetes mellitus ([address]     GERD (gastroesophageal reflux disease)     Hyperlipidemia     Hypertension     MI (myocardial infarction) ([address]     Peripheral neuropathy     Peripheral vascular disease ([address]     Pulmonary HTN ([address]     Reflux     Sleep apnea     has never done sleep study;does not use CPAP    Wears glasses     for reading       Past Surgical History:   Procedure Laterality Date    ARTERIAL BYPASS SURGRY Left 01/06/2016    Axillary/Subclavian to Brachial Bypass w/reversed SVG    CARDIAC CATHETERIZATION  03/27/2018    Dr. Michael Cisneros - [address] [facility] [address]      pancreatitis post surgery    CORONARY ANGIOPLASTY WITH STENT PLACEMENT  03/16/2018    MELODY- 3.5 x 38 inflammatory change. There is no adenopathy. Bones/Soft Tissues: There is no fracture or aggressive osseous lesion. Stable pneumobilia. Xr Chest Portable    Result Date: 5/7/2019  EXAMINATION: SINGLE XRAY VIEW OF THE CHEST 5/7/2019 3:57 pm COMPARISON: 04/19/2019 HISTORY: ORDERING SYSTEM PROVIDED HISTORY: PAIN TECHNOLOGIST PROVIDED HISTORY: Reason for exam:->PAIN Ordering Physician Provided Reason for Exam: bilat leg swelling, hx chf, hx heart attacks, hx heart stents Acuity: Acute Type of Exam: Initial FINDINGS: Cardiac silhouette is normal.  Thoracic aortic calcification is present. Surgical clip projects over the right hilum unchanged. There also surgical clips projecting over the left axilla. No focal airspace disease is identified. No acute cardiopulmonary disease. Vl Dup Upper Extremity Arteries Bilateral    Result Date: 5/17/2019  Upper Extremities Arterial Duplex  Demographics   Patient Name       [facility] [place]   Date of Study      05/17/2019         Gender              Female   Patient Number     [id number]         Date of Birth       1965   Visit Number       [id number]          Age                 52 year(s)   Accession Number   [id number]          Room Number         2776   Corporate ID       [id number]           Yesenia Astudillo RVT   Ordering Physician Ronald Ramirez        Interpreting        Doreen Mallory MD        Physician           Readers                                                            Berry Pacheco MD  Procedure Type of Study:   Extremities Arteries:Upper Extremities Arterial Duplex, VL UPPER EXTREMITY  ARTERIES DUPLEX BILATERAL. Vascular Sonographer Report  Additional Indications:Blood pressure gradients Impressions Right Impression 1.  The right wrist/brachial index is 1.0. 2. There is no focal elevated velocities seen involving the right upper extremity. 3. Continuous wave Doppler of the subclavian, axillary, brachial, radial, and ulnar arteries demonstrate normal multiphasic flow. 4. There is no gross plaques seen involving the right upper extremity. Left Impression 1. The left wrist/brachial index is 0.97. 2. There is no focal elevated velocities involving the left upper extremity with normal multiphasic flow. 3. Continuous wave Doppler of the subclavian, bypass graft, brachial, radial, and ulnar arteries demonstrate multiphasic flow. 4. There is no gross plaques seen involving the left upper extremity. 5. There is a subclavian to brachial artery bypass graft seen without focal plaque lesion or perigraft fluid involving the left upper extremity. Conclusions   Summary   1. There is no arterial insufficiency at rest involving the upper  extremities bilaterally. 2. There is no significant pressure gradient at this time. Signature   ------------------------------------------------------------------  Electronically signed by Erick Law MD (Interpreting  physician) on 05/17/2019 at 07:03 PM  ------------------------------------------------------------------  Blood Pressure:Right arm 95/ mmHg. Left arm 90/ mmHg. Patient Status:Routine. Study [facility] - Vascular Lab. Technical Quality:Adequate visualization. Risk Factors History +---------+----------+-------------------------------------------------------+ ! Diagnosis! Date      ! Comments                                               ! +---------+----------+-------------------------------------------------------+ ! Other    !05/17/2019! Patient reports the nurses were taking her blood       ! !         !          !pressures at her arms and legs both and getting really ! !         !          !low numbers in the 70's. ! +---------+----------+-------------------------------------------------------+ ! Other    !05/17/2019! Prox anast-109 cm/s, prox +---------------+----+----------------+ ! Prox Ulnar     !42. 3! Multiphasic     ! +---------------+----+----------------+ ! Dist Ulnar     !38. 3! Multiphasic     ! +---------------+----+----------------+    Vl Pre Op Vein Mapping    Result Date: 5/15/2019  Lower Extremities Vein Mapping  Demographics   Patient Name       LONG Rockey Bernheim   Date of Study      05/15/2019         Gender              Female   Patient Number     [id number]         Date of Birth       1965   Visit Number       [id number]          Age                 52 year(s)   Accession Number   [id number]          Room Number         OP   Corporate ID       [id number]           Sonographer         Manuel Mancia                                                            ADINA   Ordering Physician [address]        Oneida Maldonado MD        Physician           Readers                                                            Hailee Almanza MD  Procedure Type of Study:   Veins:Lower Extremity Vein Mapping, [address]. Vascular Sonographer Report  Indications for Study:Pre-op evaluation. Impressions Right Impression 1. The greater saphenous vein demonstrates complete compressibility throughout its course. 2. Please refer to diameter measurements. Conclusions   Summary   1. There is no evidence of superficial venous thrombosis in the greater  saphenous veins bilaterally. 2. The greater saphenous veins were measured and marked bilaterally. Signature   ------------------------------------------------------------------  Electronically signed by Hailee Almanza MD (Interpreting  physician) on 05/15/2019 at 04:19 PM  ------------------------------------------------------------------  Patient Status:Routine. Study [address] [facility] - Vascular Lab. Technical Quality:Adequate visualization.  Risk Factors History +---------+----------+-------------------------------------------------+ ! Diagnosis! Date      ! Comments                                         ! +---------+----------+-------------------------------------------------+ ! Other    !05/15/2019! Pre-op evaluation for fem-pop bypass on 5/16/2019! +---------+----------+-------------------------------------------------+   - The patient's risk factor(s) include: diabetes mellitus, dyslipidemia and     obesity.   - The patient has a current/recent (within 1 year) tobacco history. Velocities are measured in cm/s ; Diameters are measured in mm LE Vein Mapping +----------------------------------++--------+-----+----+--------+-----+ ! Superficial - Great Saphenous Vein! !Right   ! ! Left!        !     ! +----------------------------------++--------+-----+----+--------+-----+ ! Location                          ! !Diameter! Depth! !Diameter! Depth! +----------------------------------++--------+-----+----+--------+-----+ ! GSV High Thigh                    !!5.1     !     !    !        !     ! +----------------------------------++--------+-----+----+--------+-----+ ! GSV Mid Thigh                     ! !3.9     !     !    !        !     ! +----------------------------------++--------+-----+----+--------+-----+ ! GSV Low Thigh                     ! !4.5     !     !    !        !     ! +----------------------------------++--------+-----+----+--------+-----+ ! GSV Knee                          !!4.4     !     !    !        !     ! +----------------------------------++--------+-----+----+--------+-----+ ! GSV High Calf                     !!4       !     !    !        !     ! +----------------------------------++--------+-----+----+--------+-----+ ! GSV Mid Calf                      !!3.3     !     !    !        !     ! +----------------------------------++--------+-----+----+--------+-----+ ! GSV Low Calf                      !!2.6     !     !    !        !     ! +----------------------------------++--------+-----+----+--------+-----+ ! GSV Ankle                         !!3.2     !     !    !        !     ! +----------------------------------++--------+-----+----+--------+-----+  1:49 PM  Patient was discussed in detail with Dr. Amparo Corral who felt comfortable treating her conservatively with Keflex wet-to-dry sterile dressings and cleansing techniques were described and she is advised to follow-up with Dr. Amparo Corral either tomorrow or in a timely manner    New Prescriptions    CEPHALEXIN (KEFLEX) 500 MG CAPSULE    Take 1 capsule by mouth 4 times daily for 10 days       Omari Garcia MD  [address] [place] [address]  [phone]    Schedule an appointment as soon as possible for a visit   If symptoms worsen, As needed      1. Wound infection          Patient was advised at any time to return to the emergency department if there was any worsening.              Rafael Miranda MD  05/30/19 [id number]

## 2019-06-01 ENCOUNTER — HOSPITAL ENCOUNTER (INPATIENT)
Age: 54
LOS: 2 days | Discharge: HOME HEALTH | DRG: 813 | End: 2019-06-03
Attending: SURGERY | Admitting: SURGERY
Payer: COMMERCIAL

## 2019-06-01 PROBLEM — L03.90 CELLULITIS: Status: ACTIVE | Noted: 2019-06-01

## 2019-06-01 PROCEDURE — 1200000000 HC SEMI PRIVATE

## 2019-06-01 ASSESSMENT — PAIN DESCRIPTION - DESCRIPTORS: DESCRIPTORS: THROBBING

## 2019-06-01 ASSESSMENT — PAIN DESCRIPTION - PAIN TYPE: TYPE: SURGICAL PAIN

## 2019-06-01 ASSESSMENT — PAIN DESCRIPTION - ORIENTATION: ORIENTATION: RIGHT

## 2019-06-01 ASSESSMENT — PAIN DESCRIPTION - FREQUENCY: FREQUENCY: CONTINUOUS

## 2019-06-01 ASSESSMENT — PAIN SCALES - GENERAL: PAINLEVEL_OUTOF10: 8

## 2019-06-01 ASSESSMENT — PAIN DESCRIPTION - LOCATION: LOCATION: LEG

## 2019-06-01 ASSESSMENT — PAIN DESCRIPTION - ONSET: ONSET: ON-GOING

## 2019-06-02 LAB
ABO/RH: NORMAL
ALBUMIN SERPL-MCNC: 3.6 G/DL (ref 3.4–5)
ALBUMIN SERPL-MCNC: 3.7 G/DL (ref 3.4–5)
ANION GAP SERPL CALCULATED.3IONS-SCNC: 13 MMOL/L (ref 3–16)
ANION GAP SERPL CALCULATED.3IONS-SCNC: 16 MMOL/L (ref 3–16)
ANTIBODY SCREEN: NORMAL
BASOPHILS ABSOLUTE: 0 K/UL (ref 0–0.2)
BASOPHILS ABSOLUTE: 0 K/UL (ref 0–0.2)
BASOPHILS RELATIVE PERCENT: 0.4 %
BASOPHILS RELATIVE PERCENT: 0.5 %
BUN BLDV-MCNC: 22 MG/DL (ref 7–20)
BUN BLDV-MCNC: 31 MG/DL (ref 7–20)
CALCIUM SERPL-MCNC: 9.3 MG/DL (ref 8.3–10.6)
CALCIUM SERPL-MCNC: 9.3 MG/DL (ref 8.3–10.6)
CHLORIDE BLD-SCNC: 85 MMOL/L (ref 99–110)
CHLORIDE BLD-SCNC: 87 MMOL/L (ref 99–110)
CO2: 29 MMOL/L (ref 21–32)
CO2: 30 MMOL/L (ref 21–32)
CREAT SERPL-MCNC: 1.1 MG/DL (ref 0.6–1.1)
CREAT SERPL-MCNC: 1.3 MG/DL (ref 0.6–1.1)
EOSINOPHILS ABSOLUTE: 0.3 K/UL (ref 0–0.6)
EOSINOPHILS ABSOLUTE: 0.3 K/UL (ref 0–0.6)
EOSINOPHILS RELATIVE PERCENT: 3.4 %
EOSINOPHILS RELATIVE PERCENT: 3.5 %
GFR AFRICAN AMERICAN: 51
GFR AFRICAN AMERICAN: >60
GFR NON-AFRICAN AMERICAN: 42
GFR NON-AFRICAN AMERICAN: 51
GLUCOSE BLD-MCNC: 301 MG/DL (ref 70–99)
GLUCOSE BLD-MCNC: 351 MG/DL (ref 70–99)
GLUCOSE BLD-MCNC: 356 MG/DL (ref 70–99)
GLUCOSE BLD-MCNC: 459 MG/DL (ref 70–99)
GLUCOSE BLD-MCNC: 470 MG/DL (ref 70–99)
GLUCOSE BLD-MCNC: 542 MG/DL (ref 70–99)
HCT VFR BLD CALC: 36.4 % (ref 36–48)
HCT VFR BLD CALC: 36.8 % (ref 36–48)
HEMOGLOBIN: 12.1 G/DL (ref 12–16)
HEMOGLOBIN: 12.4 G/DL (ref 12–16)
INR BLD: 1.11 (ref 0.86–1.14)
LYMPHOCYTES ABSOLUTE: 0.8 K/UL (ref 1–5.1)
LYMPHOCYTES ABSOLUTE: 1.1 K/UL (ref 1–5.1)
LYMPHOCYTES RELATIVE PERCENT: 11.1 %
LYMPHOCYTES RELATIVE PERCENT: 8.5 %
MAGNESIUM: 1.4 MG/DL (ref 1.8–2.4)
MAGNESIUM: 1.6 MG/DL (ref 1.8–2.4)
MCH RBC QN AUTO: 29.9 PG (ref 26–34)
MCH RBC QN AUTO: 30.2 PG (ref 26–34)
MCHC RBC AUTO-ENTMCNC: 33.4 G/DL (ref 31–36)
MCHC RBC AUTO-ENTMCNC: 33.6 G/DL (ref 31–36)
MCV RBC AUTO: 89.6 FL (ref 80–100)
MCV RBC AUTO: 89.8 FL (ref 80–100)
MONOCYTES ABSOLUTE: 0.5 K/UL (ref 0–1.3)
MONOCYTES ABSOLUTE: 0.5 K/UL (ref 0–1.3)
MONOCYTES RELATIVE PERCENT: 5.3 %
MONOCYTES RELATIVE PERCENT: 5.4 %
NEUTROPHILS ABSOLUTE: 7.5 K/UL (ref 1.7–7.7)
NEUTROPHILS ABSOLUTE: 8 K/UL (ref 1.7–7.7)
NEUTROPHILS RELATIVE PERCENT: 79.7 %
NEUTROPHILS RELATIVE PERCENT: 82.2 %
PDW BLD-RTO: 14.8 % (ref 12.4–15.4)
PDW BLD-RTO: 15 % (ref 12.4–15.4)
PERFORMED ON: ABNORMAL
PHOSPHORUS: 2.8 MG/DL (ref 2.5–4.9)
PHOSPHORUS: 3.7 MG/DL (ref 2.5–4.9)
PLATELET # BLD: 366 K/UL (ref 135–450)
PLATELET # BLD: 379 K/UL (ref 135–450)
PMV BLD AUTO: 8.5 FL (ref 5–10.5)
PMV BLD AUTO: 8.6 FL (ref 5–10.5)
POTASSIUM SERPL-SCNC: 3.6 MMOL/L (ref 3.5–5.1)
POTASSIUM SERPL-SCNC: 4.2 MMOL/L (ref 3.5–5.1)
PROTHROMBIN TIME: 12.6 SEC (ref 9.8–13)
RBC # BLD: 4.06 M/UL (ref 4–5.2)
RBC # BLD: 4.1 M/UL (ref 4–5.2)
SODIUM BLD-SCNC: 130 MMOL/L (ref 136–145)
SODIUM BLD-SCNC: 130 MMOL/L (ref 136–145)
WBC # BLD: 10 K/UL (ref 4–11)
WBC # BLD: 9.1 K/UL (ref 4–11)

## 2019-06-02 PROCEDURE — 2500000003 HC RX 250 WO HCPCS: Performed by: STUDENT IN AN ORGANIZED HEALTH CARE EDUCATION/TRAINING PROGRAM

## 2019-06-02 PROCEDURE — 6370000000 HC RX 637 (ALT 250 FOR IP): Performed by: STUDENT IN AN ORGANIZED HEALTH CARE EDUCATION/TRAINING PROGRAM

## 2019-06-02 PROCEDURE — 83735 ASSAY OF MAGNESIUM: CPT

## 2019-06-02 PROCEDURE — 2580000003 HC RX 258: Performed by: STUDENT IN AN ORGANIZED HEALTH CARE EDUCATION/TRAINING PROGRAM

## 2019-06-02 PROCEDURE — 6370000000 HC RX 637 (ALT 250 FOR IP): Performed by: SURGERY

## 2019-06-02 PROCEDURE — 86850 RBC ANTIBODY SCREEN: CPT

## 2019-06-02 PROCEDURE — 86900 BLOOD TYPING SEROLOGIC ABO: CPT

## 2019-06-02 PROCEDURE — 80069 RENAL FUNCTION PANEL: CPT

## 2019-06-02 PROCEDURE — 36415 COLL VENOUS BLD VENIPUNCTURE: CPT

## 2019-06-02 PROCEDURE — 1200000000 HC SEMI PRIVATE

## 2019-06-02 PROCEDURE — 94761 N-INVAS EAR/PLS OXIMETRY MLT: CPT

## 2019-06-02 PROCEDURE — 85610 PROTHROMBIN TIME: CPT

## 2019-06-02 PROCEDURE — 86901 BLOOD TYPING SEROLOGIC RH(D): CPT

## 2019-06-02 PROCEDURE — 6360000002 HC RX W HCPCS: Performed by: STUDENT IN AN ORGANIZED HEALTH CARE EDUCATION/TRAINING PROGRAM

## 2019-06-02 PROCEDURE — 94640 AIRWAY INHALATION TREATMENT: CPT

## 2019-06-02 PROCEDURE — 85025 COMPLETE CBC W/AUTO DIFF WBC: CPT

## 2019-06-02 RX ORDER — ALBUTEROL SULFATE 2.5 MG/3ML
2.5 SOLUTION RESPIRATORY (INHALATION) EVERY 4 HOURS PRN
Status: DISCONTINUED | OUTPATIENT
Start: 2019-06-02 | End: 2019-06-03 | Stop reason: HOSPADM

## 2019-06-02 RX ORDER — DEXTROSE MONOHYDRATE 25 G/50ML
12.5 INJECTION, SOLUTION INTRAVENOUS PRN
Status: DISCONTINUED | OUTPATIENT
Start: 2019-06-02 | End: 2019-06-02

## 2019-06-02 RX ORDER — HEPARIN SODIUM 5000 [USP'U]/ML
5000 INJECTION, SOLUTION INTRAVENOUS; SUBCUTANEOUS EVERY 8 HOURS SCHEDULED
Status: DISCONTINUED | OUTPATIENT
Start: 2019-06-02 | End: 2019-06-03 | Stop reason: HOSPADM

## 2019-06-02 RX ORDER — SODIUM CHLORIDE 0.9 % (FLUSH) 0.9 %
10 SYRINGE (ML) INJECTION PRN
Status: DISCONTINUED | OUTPATIENT
Start: 2019-06-02 | End: 2019-06-03 | Stop reason: HOSPADM

## 2019-06-02 RX ORDER — ARIPIPRAZOLE 5 MG/1
5 TABLET ORAL DAILY
Status: DISCONTINUED | OUTPATIENT
Start: 2019-06-02 | End: 2019-06-03 | Stop reason: HOSPADM

## 2019-06-02 RX ORDER — ACETAMINOPHEN 325 MG/1
650 TABLET ORAL EVERY 4 HOURS PRN
Status: DISCONTINUED | OUTPATIENT
Start: 2019-06-02 | End: 2019-06-02

## 2019-06-02 RX ORDER — OXYCODONE HYDROCHLORIDE 5 MG/1
5 TABLET ORAL EVERY 4 HOURS PRN
Status: DISCONTINUED | OUTPATIENT
Start: 2019-06-02 | End: 2019-06-02

## 2019-06-02 RX ORDER — ONDANSETRON 2 MG/ML
4 INJECTION INTRAMUSCULAR; INTRAVENOUS EVERY 6 HOURS PRN
Status: DISCONTINUED | OUTPATIENT
Start: 2019-06-02 | End: 2019-06-03 | Stop reason: HOSPADM

## 2019-06-02 RX ORDER — ATORVASTATIN CALCIUM 80 MG/1
80 TABLET, FILM COATED ORAL NIGHTLY
Status: DISCONTINUED | OUTPATIENT
Start: 2019-06-02 | End: 2019-06-03 | Stop reason: HOSPADM

## 2019-06-02 RX ORDER — METOLAZONE 5 MG/1
5 TABLET ORAL DAILY
Status: DISCONTINUED | OUTPATIENT
Start: 2019-06-02 | End: 2019-06-03 | Stop reason: HOSPADM

## 2019-06-02 RX ORDER — SODIUM CHLORIDE 0.9 % (FLUSH) 0.9 %
10 SYRINGE (ML) INJECTION EVERY 12 HOURS SCHEDULED
Status: DISCONTINUED | OUTPATIENT
Start: 2019-06-02 | End: 2019-06-03 | Stop reason: HOSPADM

## 2019-06-02 RX ORDER — ASPIRIN 81 MG/1
81 TABLET ORAL DAILY
Status: DISCONTINUED | OUTPATIENT
Start: 2019-06-02 | End: 2019-06-02

## 2019-06-02 RX ORDER — NICOTINE POLACRILEX 4 MG
15 LOZENGE BUCCAL PRN
Status: DISCONTINUED | OUTPATIENT
Start: 2019-06-02 | End: 2019-06-03 | Stop reason: HOSPADM

## 2019-06-02 RX ORDER — CLINDAMYCIN PHOSPHATE 900 MG/50ML
900 INJECTION INTRAVENOUS EVERY 8 HOURS
Status: DISCONTINUED | OUTPATIENT
Start: 2019-06-02 | End: 2019-06-03 | Stop reason: HOSPADM

## 2019-06-02 RX ORDER — DEXTROSE MONOHYDRATE 25 G/50ML
12.5 INJECTION, SOLUTION INTRAVENOUS PRN
Status: DISCONTINUED | OUTPATIENT
Start: 2019-06-02 | End: 2019-06-03 | Stop reason: HOSPADM

## 2019-06-02 RX ORDER — CARVEDILOL 3.12 MG/1
3.12 TABLET ORAL 2 TIMES DAILY WITH MEALS
Status: DISCONTINUED | OUTPATIENT
Start: 2019-06-02 | End: 2019-06-03 | Stop reason: HOSPADM

## 2019-06-02 RX ORDER — DOXEPIN HYDROCHLORIDE 25 MG/1
50 CAPSULE ORAL NIGHTLY
Status: DISCONTINUED | OUTPATIENT
Start: 2019-06-02 | End: 2019-06-03 | Stop reason: HOSPADM

## 2019-06-02 RX ORDER — CLOPIDOGREL BISULFATE 75 MG/1
75 TABLET ORAL DAILY
Status: DISCONTINUED | OUTPATIENT
Start: 2019-06-02 | End: 2019-06-03 | Stop reason: HOSPADM

## 2019-06-02 RX ORDER — POTASSIUM CHLORIDE 20 MEQ/1
40 TABLET, EXTENDED RELEASE ORAL ONCE
Status: COMPLETED | OUTPATIENT
Start: 2019-06-02 | End: 2019-06-02

## 2019-06-02 RX ORDER — DIGOXIN 125 MCG
125 TABLET ORAL DAILY
Status: DISCONTINUED | OUTPATIENT
Start: 2019-06-02 | End: 2019-06-03 | Stop reason: HOSPADM

## 2019-06-02 RX ORDER — SPIRONOLACTONE 50 MG/1
100 TABLET, FILM COATED ORAL 2 TIMES DAILY
Status: DISCONTINUED | OUTPATIENT
Start: 2019-06-03 | End: 2019-06-03 | Stop reason: HOSPADM

## 2019-06-02 RX ORDER — BUSPIRONE HYDROCHLORIDE 10 MG/1
30 TABLET ORAL 3 TIMES DAILY
Status: DISCONTINUED | OUTPATIENT
Start: 2019-06-02 | End: 2019-06-03 | Stop reason: HOSPADM

## 2019-06-02 RX ORDER — PANTOPRAZOLE SODIUM 40 MG/1
40 TABLET, DELAYED RELEASE ORAL DAILY
Status: DISCONTINUED | OUTPATIENT
Start: 2019-06-02 | End: 2019-06-03 | Stop reason: HOSPADM

## 2019-06-02 RX ORDER — ASPIRIN 81 MG/1
81 TABLET ORAL DAILY
Status: DISCONTINUED | OUTPATIENT
Start: 2019-06-02 | End: 2019-06-03 | Stop reason: HOSPADM

## 2019-06-02 RX ORDER — OXYCODONE HYDROCHLORIDE 5 MG/1
10 TABLET ORAL EVERY 4 HOURS PRN
Status: DISCONTINUED | OUTPATIENT
Start: 2019-06-02 | End: 2019-06-02

## 2019-06-02 RX ORDER — DEXTROSE MONOHYDRATE 50 MG/ML
100 INJECTION, SOLUTION INTRAVENOUS PRN
Status: DISCONTINUED | OUTPATIENT
Start: 2019-06-02 | End: 2019-06-03 | Stop reason: HOSPADM

## 2019-06-02 RX ORDER — LAMOTRIGINE 100 MG/1
150 TABLET ORAL 2 TIMES DAILY
Status: DISCONTINUED | OUTPATIENT
Start: 2019-06-02 | End: 2019-06-03 | Stop reason: HOSPADM

## 2019-06-02 RX ORDER — SPIRONOLACTONE 50 MG/1
100 TABLET, FILM COATED ORAL 2 TIMES DAILY
Status: DISCONTINUED | OUTPATIENT
Start: 2019-06-02 | End: 2019-06-02

## 2019-06-02 RX ADMIN — ASPIRIN 81 MG: 81 TABLET, COATED ORAL at 03:06

## 2019-06-02 RX ADMIN — INSULIN HUMAN 18 UNITS: 100 INJECTION, SOLUTION PARENTERAL at 22:02

## 2019-06-02 RX ADMIN — BUSPIRONE HYDROCHLORIDE 30 MG: 10 TABLET ORAL at 09:31

## 2019-06-02 RX ADMIN — MAGNESIUM OXIDE TAB 400 MG (241.3 MG ELEMENTAL MG) 400 MG: 400 (241.3 MG) TAB at 03:06

## 2019-06-02 RX ADMIN — POTASSIUM CHLORIDE 40 MEQ: 20 TABLET, EXTENDED RELEASE ORAL at 03:06

## 2019-06-02 RX ADMIN — ATORVASTATIN CALCIUM 80 MG: 80 TABLET, FILM COATED ORAL at 03:05

## 2019-06-02 RX ADMIN — INSULIN GLARGINE 35 UNITS: 100 INJECTION, SOLUTION SUBCUTANEOUS at 21:47

## 2019-06-02 RX ADMIN — TIOTROPIUM BROMIDE 18 MCG: 18 CAPSULE ORAL; RESPIRATORY (INHALATION) at 08:26

## 2019-06-02 RX ADMIN — LAMOTRIGINE 150 MG: 100 TABLET ORAL at 03:05

## 2019-06-02 RX ADMIN — HEPARIN SODIUM 5000 UNITS: 5000 INJECTION INTRAVENOUS; SUBCUTANEOUS at 03:06

## 2019-06-02 RX ADMIN — LAMOTRIGINE 150 MG: 100 TABLET ORAL at 09:31

## 2019-06-02 RX ADMIN — DOXEPIN HYDROCHLORIDE 50 MG: 25 CAPSULE ORAL at 03:05

## 2019-06-02 RX ADMIN — CLINDAMYCIN PHOSPHATE 900 MG: 900 INJECTION, SOLUTION INTRAVENOUS at 16:47

## 2019-06-02 RX ADMIN — BUSPIRONE HYDROCHLORIDE 30 MG: 10 TABLET ORAL at 13:18

## 2019-06-02 RX ADMIN — Medication 10 ML: at 21:48

## 2019-06-02 RX ADMIN — BUSPIRONE HYDROCHLORIDE 30 MG: 10 TABLET ORAL at 20:39

## 2019-06-02 RX ADMIN — MUPIROCIN: 20 OINTMENT TOPICAL at 09:34

## 2019-06-02 RX ADMIN — METOLAZONE 5 MG: 5 TABLET ORAL at 09:31

## 2019-06-02 RX ADMIN — INSULIN HUMAN 15 UNITS: 100 INJECTION, SOLUTION PARENTERAL at 18:57

## 2019-06-02 RX ADMIN — INSULIN GLARGINE 35 UNITS: 100 INJECTION, SOLUTION SUBCUTANEOUS at 09:44

## 2019-06-02 RX ADMIN — HEPARIN SODIUM 5000 UNITS: 5000 INJECTION INTRAVENOUS; SUBCUTANEOUS at 21:47

## 2019-06-02 RX ADMIN — CLINDAMYCIN PHOSPHATE 900 MG: 900 INJECTION, SOLUTION INTRAVENOUS at 09:36

## 2019-06-02 RX ADMIN — DIGOXIN 125 MCG: 125 TABLET ORAL at 09:31

## 2019-06-02 RX ADMIN — CARVEDILOL 3.12 MG: 3.12 TABLET, FILM COATED ORAL at 17:56

## 2019-06-02 RX ADMIN — DOXEPIN HYDROCHLORIDE 50 MG: 25 CAPSULE ORAL at 20:39

## 2019-06-02 RX ADMIN — MUPIROCIN: 20 OINTMENT TOPICAL at 03:07

## 2019-06-02 RX ADMIN — SACUBITRIL AND VALSARTAN 0.5 TABLET: 24; 26 TABLET, FILM COATED ORAL at 16:52

## 2019-06-02 RX ADMIN — PANTOPRAZOLE SODIUM 40 MG: 40 TABLET, DELAYED RELEASE ORAL at 06:02

## 2019-06-02 RX ADMIN — ARIPIPRAZOLE 5 MG: 5 TABLET ORAL at 09:31

## 2019-06-02 RX ADMIN — TORSEMIDE 50 MG: 20 TABLET ORAL at 09:30

## 2019-06-02 RX ADMIN — CARVEDILOL 3.12 MG: 3.12 TABLET, FILM COATED ORAL at 09:31

## 2019-06-02 RX ADMIN — CLOPIDOGREL BISULFATE 75 MG: 75 TABLET ORAL at 09:44

## 2019-06-02 RX ADMIN — MUPIROCIN: 20 OINTMENT TOPICAL at 21:47

## 2019-06-02 RX ADMIN — LAMOTRIGINE 150 MG: 100 TABLET ORAL at 20:40

## 2019-06-02 RX ADMIN — ATORVASTATIN CALCIUM 80 MG: 80 TABLET, FILM COATED ORAL at 20:39

## 2019-06-02 RX ADMIN — CLINDAMYCIN PHOSPHATE 900 MG: 900 INJECTION, SOLUTION INTRAVENOUS at 03:06

## 2019-06-02 ASSESSMENT — PAIN SCALES - GENERAL
PAINLEVEL_OUTOF10: 0
PAINLEVEL_OUTOF10: 3
PAINLEVEL_OUTOF10: 0
PAINLEVEL_OUTOF10: 6
PAINLEVEL_OUTOF10: 0

## 2019-06-02 ASSESSMENT — PAIN DESCRIPTION - ORIENTATION
ORIENTATION: RIGHT
ORIENTATION: RIGHT

## 2019-06-02 ASSESSMENT — PAIN DESCRIPTION - PAIN TYPE
TYPE: SURGICAL PAIN
TYPE: SURGICAL PAIN

## 2019-06-02 ASSESSMENT — PAIN DESCRIPTION - LOCATION
LOCATION: LEG
LOCATION: LEG

## 2019-06-02 ASSESSMENT — PAIN DESCRIPTION - DESCRIPTORS
DESCRIPTORS: ACHING
DESCRIPTORS: ACHING;THROBBING

## 2019-06-02 NOTE — H&P
Resident History and Physical   General Surgery    Chief Complaint: Superficial wound dehiscence     History of Present Illness:    Kishore Jaeger is a 52 y.o. female with an extensive past medical history including pulmonary HTN, HTN, HLD, CHF, COPD, CAD s/p multiple MIs requiring numerous drug eluting stents (last placed 3/16/2018), severe PAD s/p bilateral SFA stents which occluded leading to progressive severe claudication, resulting in recent right femoral to popliteal bypass with reverse greater saphenous vein with Dr. Seun Gallagher at [facility] on 5/16. Patient reports that she was recovering well since her surgery until a couple of days ago when the superior aspect of her incision opened. She states that she attempted to treat the small opening (about 1cm at that time) with a gauze dressing at home due to her belief that she was financially unable to bring the issue to medical attention in the outpatient setting. The wound, however, continued to superficially dehisce over the course of the days leading up to presentation for her current hospitalization. Patient reports no increase in post-operative pain, no systemic symptoms such as fever, chills, nausea, or vomiting. Patient endorses a history of MRSA infection in the past requiring hospitalization.      Past Medical History:        Diagnosis Date    Anxiety and depression     CAD (coronary artery disease) 01/2018    Cardiomyopathy ([address]     CHF (congestive heart failure) (HCC)     COPD (chronic obstructive pulmonary disease) (HCC)     Diabetes mellitus ([address]     GERD (gastroesophageal reflux disease)     Hyperlipidemia     Hypertension     MI (myocardial infarction) ([address]     Peripheral neuropathy     Peripheral vascular disease ([address]     Pulmonary HTN ([address]     Reflux     Sleep apnea     has never done sleep study;does not use CPAP    Wears glasses     for reading     Past Surgical History:        Procedure Laterality Date    ARTERIAL BYPASS SURGRY Left 01/06/2016    Axillary/Subclavian to Brachial Bypass w/reversed SVG    CARDIAC CATHETERIZATION  03/27/2018    Dr. Cordero Coffee - at [address]      pancreatitis post surgery    CORONARY ANGIOPLASTY WITH STENT PLACEMENT  03/16/2018    MELODY- 3.5 x 38 and 3.5 x 20 to Cx    CORONARY ANGIOPLASTY WITH STENT PLACEMENT  01/03/2018    MELODY- 3.0 x 38 and 2.75 x 26 and 2.75 x 8 and 2.75 x 22 to the LAD    FEMORAL BYPASS Right 5/16/2019    RIGHT FEMORAL POPLITEAL ARTERY BYPASS GRAFT performed by Jayson Hi MD at [address]  02/14/2019    CardioMEMs insertion for CHF    ROTATOR CUFF REPAIR Left 04/22/2016    TONSILLECTOMY      TUMOR EXCISION      benign tumors X 2 chest & axilla    UPPER GASTROINTESTINAL ENDOSCOPY  4/25/2013    BX BAYLEE dunne, gastritis    UPPER GASTROINTESTINAL ENDOSCOPY  12/10/2015    UPPER GASTROINTESTINAL ENDOSCOPY  01/06/2017    Gastritis     Allergies:  Lisinopril    Medications:   Home Meds  No current facility-administered medications on file prior to encounter.       Current Outpatient Medications on File Prior to Encounter   Medication Sig Dispense Refill    cephALEXin (KEFLEX) 500 MG capsule Take 1 capsule by mouth 4 times daily for 10 days 40 capsule 0    metolazone (ZAROXOLYN) 5 MG tablet TAKE 1 TABLET BY MOUTH EVERY DAY 90 tablet 3    torsemide (DEMADEX) 100 MG tablet Take 0.5 tablets by mouth daily 30 tablet 3    magnesium oxide (MAG-OX) 400 (240 Mg) MG tablet TAKE 1 TABLET ONCE DAILY 30 tablet 11    insulin glargine (LANTUS) 100 UNIT/ML injection vial Inject 35 Units into the skin 2 times daily 3 vial 3    insulin lispro (HUMALOG KWIKPEN) 100 UNIT/ML pen Inject 15 Units into the skin 3 times daily (before meals) 5 pen 3    digoxin (LANOXIN) 125 MCG tablet Take 1 tablet by mouth daily 30 tablet 3    spironolactone (ALDACTONE) 100 MG tablet Take 1 tablet by mouth 2 times daily 60 tablet 5    albuterol sulfate HFA (VENTOLIN HFA) 108 (90 Base) MCG/ACT inhaler Inhale 2 puffs into the lungs every 6 hours as needed for Wheezing or Shortness of Breath 1 Inhaler 6    sacubitril-valsartan (ENTRESTO) 24-26 MG per tablet Take 0.5 tablets by mouth every evening 60 tablet 0    potassium chloride (KLOR-CON M) 20 MEQ extended release tablet Take 2 tablets by mouth 3 times daily (Patient taking differently: Take 40 mEq by mouth 2 times daily ) 180 tablet 3    clopidogrel (PLAVIX) 75 MG tablet Take 1 tablet by mouth daily 30 tablet 5    tiotropium (SPIRIVA RESPIMAT) 2.5 MCG/ACT AERS inhaler Inhale 2 puffs into the lungs daily 1 Inhaler 6    carvedilol (COREG) 3.125 MG tablet Take 1 tablet by mouth 2 times daily (with meals) 60 tablet 5    atorvastatin (LIPITOR) 80 MG tablet Take 80 mg by mouth nightly      busPIRone (BUSPAR) 30 MG tablet Take 30 mg by mouth 3 times daily      metFORMIN (GLUCOPHAGE-XR) 500 MG extended release tablet Take 1,000 mg by mouth 2 times daily      doxepin (SINEQUAN) 50 MG capsule Take 50 mg by mouth nightly      aspirin EC 81 MG EC tablet Take 1 tablet by mouth daily 30 tablet 3    pantoprazole (PROTONIX) 40 MG tablet Take 1 tablet by mouth daily (Patient taking differently: Take 40 mg by mouth 2 times daily ) 30 tablet 0    ARIPiprazole (ABILIFY) 10 MG tablet Take 5 mg by mouth daily       lamoTRIgine (LAMICTAL) 150 MG tablet Take 150 mg by mouth 2 times daily       Insulin Pen Needle (B-D ULTRAFINE III SHORT PEN) 31G X 8 MM MISC Five shots a day 200 each 2     Current Meds    albuterol (PROVENTIL) nebulizer solution 2.5 mg Q4H PRN   ARIPiprazole (ABILIFY) tablet 5 mg Daily   atorvastatin (LIPITOR) tablet 80 mg Nightly   busPIRone (BUSPAR) tablet 30 mg TID   carvedilol (COREG) tablet 3.125 mg BID WC   clopidogrel (PLAVIX) tablet 75 mg Daily   digoxin (LANOXIN) tablet 125 mcg Daily   doxepin (SINEQUAN) capsule 50 mg Nightly   [Held by provider] insulin glargine (LANTUS) injection pen 35 Units BID   lamoTRIgine (LAMICTAL) tablet 150 mg BID   magnesium oxide (MAG-OX) tablet 400 mg Daily   metolazone (ZAROXOLYN) tablet 5 mg Daily   pantoprazole (PROTONIX) tablet 40 mg Daily   [Held by provider] sacubitril-valsartan (ENTRESTO) 24-26 MG per tablet 0.5 tablet QPM   tiotropium (SPIRIVA) inhalation capsule 18 mcg Daily   [Held by provider] torsemide (DEMADEX) tablet 50 mg Daily   sodium chloride flush 0.9 % injection 10 mL 2 times per day   sodium chloride flush 0.9 % injection 10 mL PRN   ondansetron (ZOFRAN) injection 4 mg Q6H PRN   heparin (porcine) injection 5,000 Units 3 times per day   glucose (GLUTOSE) 40 % oral gel 15 g PRN   dextrose 50 % solution 12.5 g PRN   glucagon (rDNA) injection 1 mg PRN   dextrose 5 % solution PRN   clindamycin (CLEOCIN) 900 mg in dextrose 5 % 50 mL IVPB Q8H   aspirin EC tablet 81 mg Daily   [Held by provider] spironolactone (ALDACTONE) tablet 100 mg BID   mupirocin (BACTROBAN) 2 % ointment BID     Family History:   Family History   Problem Relation Age of Onset    Heart Disease Maternal Grandmother     Cancer Mother         lung and brain cancer    Cancer Maternal Aunt         lung and brain cancer     Social History:   TOBACCO:   reports that she has been smoking cigarettes. She has a 15.00 pack-year smoking history. She has never used smokeless tobacco.  ETOH:   reports that she does not drink alcohol. DRUGS:   reports that she does not use drugs. Review of Systems:   A 14 point review of systems was conducted, significant findings as noted in HPI. All other systems negative.      Physical Exam:    Vitals:    06/01/19 2358   BP: 94/64   Pulse: 97   Resp: 17   Temp: 97.4 °F (36.3 °C)   TempSrc: Oral   SpO2: 97%   Weight: 230 lb (104.3 kg)   Height: 5' 4.5\" (1.638 m)     General Appearance: Alert; no acute distress; grooming appropriate  HEENT: Normocephalic/atraumatic; trachea midline; patent airway  Chest/Lungs: Normal effort; clear to auscultation bilaterally; no crackles, no wheezes, no rhonchi, no rubs  Cardiovascular: Regular rate and rhythm; no murmurs, no rubs, no gallops  Abdomen: Non-distended; soft; non-tender; no guarding, no rigidity  Skin: Warm and dry; bilateral lower extremity erythema; RLE with ~3cm of superficial dehiscence of superior aspect of incision with fibrotic and granulation tissue but no purulence  Extremities: Bilateral pitting pedal edema; no cyanosis  Neuro: A&Ox3; no focal deficits; sensation intact    Pulses    F PT DP   R + -/+ -/+   L + -/+ -/-    +, -/+ ,-/-           Laboratory Results:    CBC:   Recent Labs     06/02/19  0101   WBC 10.0   HGB 12.1   HCT 36.4   MCV 89.6        BMP:   Recent Labs     06/02/19 0101   *   K 3.6   CL 85*   CO2 29   PHOS 3.7   BUN 31*   CREATININE 1.3*     PT/INR:   Recent Labs     06/02/19 0101   PROTIME 12.6   INR 1.11     APTT: No results for input(s): APTT in the last 72 hours. Liver Profile:  Lab Results   Component Value Date    AST 11 05/07/2019    ALT 9 05/07/2019    BILIDIR <0.2 05/02/2019    BILITOT 0.3 05/07/2019    ALKPHOS 96 05/07/2019     Lab Results   Component Value Date    CHOL 152 11/28/2018    HDL 25 11/28/2018    TRIG 255 11/28/2018     UA:   Lab Results   Component Value Date    COLORU Yellow 05/15/2019    PHUR 7.5 05/15/2019    WBCUA None seen 05/23/2018    RBCUA 3-5 05/23/2018    BACTERIA 1+ 03/14/2017    CLARITYU Clear 05/15/2019    SPECGRAV <=1.005 05/15/2019    LEUKOCYTESUR Negative 05/15/2019    UROBILINOGEN 0.2 05/15/2019    BILIRUBINUR Negative 05/15/2019    BLOODU Negative 05/15/2019    GLUCOSEU Negative 05/15/2019     Imaging:   No orders to display     Assessment/Plan:   This is a 52 y.o. female with an extensive past medical history including severe PAD s/p bilateral SFA stents which occluded leading to progressive severe claudication resulting in recent right femoral to popliteal bypass with reverse greater saphenous vein with Dr. Jose Gautam at [facility] on . Patient presents as a direct transfer from an outside facility secondary to progressively worsening superficial wound dehiscence with superimposed cellulitis.      Superficial wound debridement RLE with superimposed cellulitis     - Wound irrigated with normal saline and re-dressed with [employer]    - IV Given history of MRSA infection will start on IV Clindamycin and initiate decolonization with Bactroban and hibiclens showers    - Potential exists for need to undergo OR debridement -- will discuss further with staff    - Labs ordered -- will follow-up results     CAD status post multiple drug eluting stents     - Continue home ASA 81, Plavix, Lipitor     - Tele monitoring ordered  COPD    - Continue home medications   Chronic Pulmonary HTN    - Continue home medications   DM    - Holding Lantus tonight secondary to patient stating that she has been NPO for >24hrs     FEN: IVF: None secondary to diuresis; Diet: NPO   VTE PPx: Subcutaneous Heparin   Pulmonary toilet: IS ordered, encouraged frequent use  Activity: Out of bed to chair, encouraged frequent ambulation  Patient seen and examined with senior resident, will discuss with staff    Mitchell Marrero MD, MPH   PGY-1 General Surgery  19  1:46 AM  [phone]

## 2019-06-02 NOTE — PROGRESS NOTES
RESPIRATORY THERAPY ASSESSMENT    Name:  Charmayne Novas  Medical Record Number:  [id number]  Age: 52 y.o. Gender: female  : 1965  Today's Date:  2019  Room:  [address] [place] [address]-    Assessment     Is the patient being admitted for a COPD or Asthma exacerbation? No   (If yes the patient will be seen every 4 hours for the first 24 hours and then reassessed)    Patient Admission Diagnosis      Allergies  Allergies   Allergen Reactions    Lisinopril Other (See Comments)     Severe hypotension       Minimum Predicted Vital Capacity:     816          Actual Vital Capacity:      1500              Pulmonary History:COPD  Home Oxygen Therapy:  room air  Home Respiratory Therapy:Albuterol and Tiotropium Bromide   Current Respiratory Therapy:  Spiriva, Albuterol  Treatment Type: IS       Respiratory Severity Index(RSI)   Patients with orders for inhalation medications, oxygen, or any therapeutic treatment modality will be placed on Respiratory Protocol. They will be assessed with the first treatment and at least every 72 hours thereafter. The following severity scale will be used to determine frequency of treatment intervention.     Smoking History: Pulmonary Disease or Smoking History, Greater than 15 pack year = 2    Social History  Social History     Tobacco Use    Smoking status: Current Every Day Smoker     Packs/day: 0.50     Years: 30.00     Pack years: 15.00     Types: Cigarettes    Smokeless tobacco: Never Used    Tobacco comment: pt states she \"quit on mothers day\"    Substance Use Topics    Alcohol use: No    Drug use: No       Recent Surgical History: None = 0  Past Surgical History  Past Surgical History:   Procedure Laterality Date    ARTERIAL BYPASS SURGRY Left 2016    Axillary/Subclavian to Brachial Bypass w/reversed SVG    CARDIAC CATHETERIZATION  2018    Dr. Tara Gallagher - at [address]      pancreatitis post surgery   [address]  WITH STENT PLACEMENT  03/16/2018    MELODY- 3.5 x 38 and 3.5 x 20 to Cx    CORONARY ANGIOPLASTY WITH STENT PLACEMENT  01/03/2018    MELODY- 3.0 x 38 and 2.75 x 26 and 2.75 x 8 and 2.75 x 22 to the LAD    FEMORAL BYPASS Right 5/16/2019    RIGHT FEMORAL POPLITEAL ARTERY BYPASS GRAFT performed by Jason Beasley MD at [address]  02/14/2019    CardioMEMs insertion for CHF    ROTATOR CUFF REPAIR Left 04/22/2016    TONSILLECTOMY      TUMOR EXCISION      benign tumors X 2 chest & axilla    UPPER GASTROINTESTINAL ENDOSCOPY  4/25/2013    BX barretts, HH, gastritis    UPPER GASTROINTESTINAL ENDOSCOPY  12/10/2015    UPPER GASTROINTESTINAL ENDOSCOPY  01/06/2017    Gastritis       Level of Consciousness: Alert, Oriented, and Cooperative = 0    Level of Activity: Walking unassisted = 0    Respiratory Pattern: Regular Pattern; RR 8-20 = 0    Breath Sounds: Clear = 0    Sputum   ,  ,    Cough: Strong, spontaneous, non-productive = 0    Vital Signs   BP 94/64   Pulse 97   Temp 97.4 °F (36.3 °C) (Oral)   Resp 18   Ht 5' 4.5\" (1.638 m)   Wt 230 lb (104.3 kg)   LMP 10/24/2012   SpO2 96%   BMI 38.87 kg/m²   SPO2 (COPD values may differ): Greater than or equal to 92% on room air = 0    Peak Flow (asthma only): not applicable = 0    RSI: 0-4 = See once and convert to home regimen or discontinue        Plan       Goals: medication delivery    Patient/caregiver was educated on the proper method of use for Respiratory Care Devices:  Yes      Level of patient/caregiver understanding able to:   ? Verbalize understanding   ? Demonstrate understanding       ? Teach back        ? Needs reinforcement       ? No available caregiver               ? Other:     Response to education:  Good     Is patient being placed on Home Treatment Regimen? Yes     Does the patient have everything they need prior to discharge? Yes     Comments: Pt denies dyspnea or SOB at this time. Pt seen and chart reviewed.     Plan of Care: Continue formulary change for home regimen and keep SpO2 > or = 92% per protocol. Electronically signed by Jerry Noriega RCP on 6/2/2019 at 2:47 AM    Respiratory Protocol Guidelines     1. Assessment and treatment by Respiratory Therapy will be initiated for medication and therapeutic interventions upon initiation of aerosolized medication. 2. Physician will be contacted for respiratory rate (RR) greater than 35 breaths per minute. Therapy will be held for heart rate (HR) greater than 140 beats per minute, pending direction from physician. 3. Bronchodilators will be administered via Metered Dose Inhaler (MDI) with spacer when the following criteria are met:  a. Alert and cooperative     b. HR < 140 bpm  c. RR < 30 bpm                d. Can demonstrate a 2-3 second inspiratory hold  4. Bronchodilators will be administered via Hand Held Nebulizer [facility]) to patients when ANY of the following criteria are met  a. Incognizant or uncooperative          b. Patients treated with HHN at Home        c. Unable to demonstrate proper use of MDI with spacer     d. RR > 30 bpm   5. Bronchodilators will be delivered via Metered Dose Inhaler (MDI), HHN, Aerogen to intubated patients on mechanical ventilation. 6. Inhalation medication orders will be delivered and/or substituted as outlined below. Aerosolized Medications Ordering and Administration Guidelines:    1. All Medications will be ordered by a physician, and their frequency and/or modality will be adjusted as defined by the patients Respiratory Severity Index (RSI) score. 2. If the patient does not have documented COPD, consider discontinuing anticholinergics when RSI is less than 9.  3. If the bronchospasm worsens (increased RSI), then the bronchodilator frequency can be increased to a maximum of every 4 hours. If greater than every 4 hours is required, the physician will be contacted.   4. If the bronchospasm improves, the frequency of the bronchodilator can be decreased, based on the patient's RSI, but not less than home treatment regimen frequency. 5. Bronchodilator(s) will be discontinued if patient has a RSI less than 9 and has received no scheduled or as needed treatment for 72  Hrs. Patients Ordered on a Mucolytic Agent:    1. Must always be administered with a bronchodilator. 2. Discontinue if patient experiences worsened bronchospasm, or secretions have lessened to the point that the patient is able to clear them with a cough. Anti-inflammatory and Combination Medications:    1. If the patient lacks prior history of lung disease, is not using inhaled anti-inflammatory medication at home, and lacks wheezing by examination or by history for at least 24 hours, contact physician for possible discontinuation.

## 2019-06-02 NOTE — PROGRESS NOTES
Pts blood sugar is 470 with check before dinner. Surgical resident paged to make aware. Will monitor.

## 2019-06-03 VITALS
HEIGHT: 65 IN | HEART RATE: 88 BPM | RESPIRATION RATE: 16 BRPM | BODY MASS INDEX: 38.32 KG/M2 | OXYGEN SATURATION: 98 % | SYSTOLIC BLOOD PRESSURE: 99 MMHG | DIASTOLIC BLOOD PRESSURE: 78 MMHG | WEIGHT: 230 LBS | TEMPERATURE: 97 F

## 2019-06-03 LAB
ALBUMIN SERPL-MCNC: 3.6 G/DL (ref 3.4–5)
ALBUMIN SERPL-MCNC: 3.7 G/DL (ref 3.4–5)
ANION GAP SERPL CALCULATED.3IONS-SCNC: 15 MMOL/L (ref 3–16)
ANION GAP SERPL CALCULATED.3IONS-SCNC: 15 MMOL/L (ref 3–16)
BASOPHILS ABSOLUTE: 0.1 K/UL (ref 0–0.2)
BASOPHILS RELATIVE PERCENT: 0.5 %
BUN BLDV-MCNC: 23 MG/DL (ref 7–20)
BUN BLDV-MCNC: 23 MG/DL (ref 7–20)
CALCIUM SERPL-MCNC: 9.1 MG/DL (ref 8.3–10.6)
CALCIUM SERPL-MCNC: 9.4 MG/DL (ref 8.3–10.6)
CHLORIDE BLD-SCNC: 80 MMOL/L (ref 99–110)
CHLORIDE BLD-SCNC: 81 MMOL/L (ref 99–110)
CO2: 26 MMOL/L (ref 21–32)
CO2: 29 MMOL/L (ref 21–32)
CREAT SERPL-MCNC: 1 MG/DL (ref 0.6–1.1)
CREAT SERPL-MCNC: 1.1 MG/DL (ref 0.6–1.1)
EOSINOPHILS ABSOLUTE: 0.3 K/UL (ref 0–0.6)
EOSINOPHILS RELATIVE PERCENT: 2.9 %
GFR AFRICAN AMERICAN: >60
GFR AFRICAN AMERICAN: >60
GFR NON-AFRICAN AMERICAN: 51
GFR NON-AFRICAN AMERICAN: 57
GLUCOSE BLD-MCNC: 264 MG/DL (ref 70–99)
GLUCOSE BLD-MCNC: 274 MG/DL (ref 70–99)
GLUCOSE BLD-MCNC: 284 MG/DL (ref 70–99)
GLUCOSE BLD-MCNC: 287 MG/DL (ref 70–99)
GLUCOSE BLD-MCNC: 329 MG/DL (ref 70–99)
GLUCOSE BLD-MCNC: 398 MG/DL (ref 70–99)
HCT VFR BLD CALC: 34.4 % (ref 36–48)
HEMOGLOBIN: 11.6 G/DL (ref 12–16)
LYMPHOCYTES ABSOLUTE: 1.3 K/UL (ref 1–5.1)
LYMPHOCYTES RELATIVE PERCENT: 13.3 %
MAGNESIUM: 1.5 MG/DL (ref 1.8–2.4)
MAGNESIUM: 1.5 MG/DL (ref 1.8–2.4)
MCH RBC QN AUTO: 30 PG (ref 26–34)
MCHC RBC AUTO-ENTMCNC: 33.9 G/DL (ref 31–36)
MCV RBC AUTO: 88.7 FL (ref 80–100)
MONOCYTES ABSOLUTE: 0.6 K/UL (ref 0–1.3)
MONOCYTES RELATIVE PERCENT: 5.9 %
NEUTROPHILS ABSOLUTE: 7.5 K/UL (ref 1.7–7.7)
NEUTROPHILS RELATIVE PERCENT: 77.4 %
PDW BLD-RTO: 14.8 % (ref 12.4–15.4)
PERFORMED ON: ABNORMAL
PHOSPHORUS: 2.7 MG/DL (ref 2.5–4.9)
PHOSPHORUS: 3.2 MG/DL (ref 2.5–4.9)
PLATELET # BLD: 363 K/UL (ref 135–450)
PMV BLD AUTO: 8.5 FL (ref 5–10.5)
POTASSIUM SERPL-SCNC: 2.9 MMOL/L (ref 3.5–5.1)
POTASSIUM SERPL-SCNC: 4.2 MMOL/L (ref 3.5–5.1)
RBC # BLD: 3.87 M/UL (ref 4–5.2)
SODIUM BLD-SCNC: 121 MMOL/L (ref 136–145)
SODIUM BLD-SCNC: 125 MMOL/L (ref 136–145)
WBC # BLD: 9.7 K/UL (ref 4–11)

## 2019-06-03 PROCEDURE — 83735 ASSAY OF MAGNESIUM: CPT

## 2019-06-03 PROCEDURE — 2580000003 HC RX 258: Performed by: STUDENT IN AN ORGANIZED HEALTH CARE EDUCATION/TRAINING PROGRAM

## 2019-06-03 PROCEDURE — 6370000000 HC RX 637 (ALT 250 FOR IP): Performed by: SURGERY

## 2019-06-03 PROCEDURE — 6360000002 HC RX W HCPCS: Performed by: SURGERY

## 2019-06-03 PROCEDURE — 6360000002 HC RX W HCPCS: Performed by: STUDENT IN AN ORGANIZED HEALTH CARE EDUCATION/TRAINING PROGRAM

## 2019-06-03 PROCEDURE — 6370000000 HC RX 637 (ALT 250 FOR IP): Performed by: STUDENT IN AN ORGANIZED HEALTH CARE EDUCATION/TRAINING PROGRAM

## 2019-06-03 PROCEDURE — 80069 RENAL FUNCTION PANEL: CPT

## 2019-06-03 PROCEDURE — 36415 COLL VENOUS BLD VENIPUNCTURE: CPT

## 2019-06-03 PROCEDURE — 85025 COMPLETE CBC W/AUTO DIFF WBC: CPT

## 2019-06-03 PROCEDURE — 2500000003 HC RX 250 WO HCPCS: Performed by: STUDENT IN AN ORGANIZED HEALTH CARE EDUCATION/TRAINING PROGRAM

## 2019-06-03 RX ORDER — POTASSIUM CHLORIDE 20 MEQ/1
40 TABLET, EXTENDED RELEASE ORAL ONCE
Status: COMPLETED | OUTPATIENT
Start: 2019-06-03 | End: 2019-06-03

## 2019-06-03 RX ORDER — CLINDAMYCIN HYDROCHLORIDE 150 MG/1
300 CAPSULE ORAL 3 TIMES DAILY
Qty: 90 CAPSULE | Refills: 0 | Status: SHIPPED | OUTPATIENT
Start: 2019-06-03 | End: 2019-06-15

## 2019-06-03 RX ORDER — POTASSIUM CHLORIDE 7.45 MG/ML
10 INJECTION INTRAVENOUS
Status: DISCONTINUED | OUTPATIENT
Start: 2019-06-03 | End: 2019-06-03

## 2019-06-03 RX ORDER — MAGNESIUM SULFATE IN WATER 40 MG/ML
4 INJECTION, SOLUTION INTRAVENOUS ONCE
Status: DISCONTINUED | OUTPATIENT
Start: 2019-06-03 | End: 2019-06-03

## 2019-06-03 RX ADMIN — INSULIN LISPRO 15 UNITS: 100 INJECTION, SOLUTION INTRAVENOUS; SUBCUTANEOUS at 13:27

## 2019-06-03 RX ADMIN — POTASSIUM CHLORIDE 10 MEQ: 7.46 INJECTION, SOLUTION INTRAVENOUS at 07:43

## 2019-06-03 RX ADMIN — TIOTROPIUM BROMIDE 18 MCG: 18 CAPSULE ORAL; RESPIRATORY (INHALATION) at 09:58

## 2019-06-03 RX ADMIN — LAMOTRIGINE 150 MG: 100 TABLET ORAL at 08:03

## 2019-06-03 RX ADMIN — Medication 10 ML: at 07:43

## 2019-06-03 RX ADMIN — MUPIROCIN: 20 OINTMENT TOPICAL at 09:46

## 2019-06-03 RX ADMIN — CLOPIDOGREL BISULFATE 75 MG: 75 TABLET ORAL at 09:12

## 2019-06-03 RX ADMIN — ARIPIPRAZOLE 5 MG: 5 TABLET ORAL at 08:04

## 2019-06-03 RX ADMIN — BUSPIRONE HYDROCHLORIDE 30 MG: 10 TABLET ORAL at 09:12

## 2019-06-03 RX ADMIN — CARVEDILOL 3.12 MG: 3.12 TABLET, FILM COATED ORAL at 08:05

## 2019-06-03 RX ADMIN — METOLAZONE 5 MG: 5 TABLET ORAL at 08:09

## 2019-06-03 RX ADMIN — POTASSIUM CHLORIDE 40 MEQ: 20 TABLET, EXTENDED RELEASE ORAL at 11:10

## 2019-06-03 RX ADMIN — DIGOXIN 125 MCG: 125 TABLET ORAL at 08:05

## 2019-06-03 RX ADMIN — CLINDAMYCIN PHOSPHATE 900 MG: 900 INJECTION, SOLUTION INTRAVENOUS at 01:46

## 2019-06-03 RX ADMIN — ASPIRIN 81 MG: 81 TABLET, COATED ORAL at 08:04

## 2019-06-03 RX ADMIN — HEPARIN SODIUM 5000 UNITS: 5000 INJECTION INTRAVENOUS; SUBCUTANEOUS at 09:19

## 2019-06-03 RX ADMIN — MAGNESIUM SULFATE HEPTAHYDRATE 4 G: 40 INJECTION, SOLUTION INTRAVENOUS at 09:41

## 2019-06-03 RX ADMIN — TORSEMIDE 50 MG: 20 TABLET ORAL at 08:09

## 2019-06-03 RX ADMIN — HEPARIN SODIUM 5000 UNITS: 5000 INJECTION INTRAVENOUS; SUBCUTANEOUS at 03:36

## 2019-06-03 RX ADMIN — PANTOPRAZOLE SODIUM 40 MG: 40 TABLET, DELAYED RELEASE ORAL at 08:05

## 2019-06-03 RX ADMIN — SPIRONOLACTONE 100 MG: 50 TABLET ORAL at 08:09

## 2019-06-03 RX ADMIN — BUSPIRONE HYDROCHLORIDE 30 MG: 10 TABLET ORAL at 13:33

## 2019-06-03 RX ADMIN — INSULIN HUMAN 12 UNITS: 100 INJECTION, SOLUTION PARENTERAL at 09:18

## 2019-06-03 RX ADMIN — MAGNESIUM OXIDE TAB 400 MG (241.3 MG ELEMENTAL MG) 400 MG: 400 (241.3 MG) TAB at 11:10

## 2019-06-03 RX ADMIN — INSULIN GLARGINE 35 UNITS: 100 INJECTION, SOLUTION SUBCUTANEOUS at 09:17

## 2019-06-03 RX ADMIN — POTASSIUM CHLORIDE 40 MEQ: 20 TABLET, EXTENDED RELEASE ORAL at 07:50

## 2019-06-03 ASSESSMENT — PAIN SCALES - GENERAL
PAINLEVEL_OUTOF10: 0
PAINLEVEL_OUTOF10: 6
PAINLEVEL_OUTOF10: 0

## 2019-06-03 NOTE — PROGRESS NOTES
Vascular Surgery Daily Progress Note  Patient: Lisseth Bautista    CC: Superficial wound dehiscence       Subjective :  Blood sugar was elevated overnight and patient was placed on high dose sliding scale. Pain is controlled. Ambulating. Tolerating diet without N/V.    ROS: A 14 point review of systems was conducted, significant findings as noted above. All other systems negative. Objective : Infusions:   dextrose          I/O:I/O last 3 completed shifts: In: 950 [P.O.:950]  Out: 4800 [Urine:4800]           Wt Readings from Last 1 Encounters:   06/01/19 230 lb (104.3 kg)       Exam:/78   Pulse 86   Temp 97.8 °F (36.6 °C) (Axillary)   Resp 16   Ht 5' 4.5\" (1.638 m)   Wt 230 lb (104.3 kg)   LMP 10/24/2012   SpO2 96%   BMI 38.87 kg/m²     General Appearance: Alert; no acute distress; grooming appropriate  HEENT: Normocephalic/atraumatic; trachea midline; patent airway  Chest/Lungs: Normal effort; clear to auscultation bilaterally; no crackles, no wheezes, no rhonchi, no rubs  Cardiovascular: Regular rate and rhythm; no murmurs, no rubs, no gallops  Abdomen: Non-distended; soft; non-tender; no guarding, no rigidity  Skin: Warm and dry; bilateral lower extremity erythema is improving; RLE with ~3cm of superficial dehiscence of superior aspect of incision with fibrotic and granulation tissue but no purulence  Extremities: Bilateral pitting pedal edema; no cyanosis  Neuro: A&Ox3; no focal deficits; sensation intact     Pulses     F PT DP   R + -/+ -/+   L + -/+ -/-    +, -/+ ,-/-         LABS:   Recent Labs     06/02/19  0944 06/03/19  0505   WBC 9.1 9.7   HGB 12.4 11.6*   HCT 36.8 34.4*   MCV 89.8 88.7    363        Recent Labs     06/02/19  0101 06/02/19  0944   * 130*   K 3.6 4.2   CL 85* 87*   CO2 29 30   PHOS 3.7 2.8   BUN 31* 22*   CREATININE 1.3* 1.1      No results for input(s): AST, ALT, ALB, BILIDIR, BILITOT, ALKPHOS in the last 72 hours.    No results for input(s): LIPASE, AMYLASE

## 2019-06-03 NOTE — PROGRESS NOTES
Wound vac delivered to patient's room. Pt is waiting for transportation to arrive at [id number]. IV and telemetry removed.

## 2019-06-03 NOTE — DISCHARGE INSTR - COC
Continuity of Care Form    Patient Name: Kurt Mistry   :  1965  MRN:  [id number]    Admit date:  2019  Discharge date:  6/3/2019    Code Status Order: Full Code   Advance Directives:   [facility] Documentation     Date/Time Healthcare Directive Type of Healthcare Directive Copy in [facility] [address] Agent's Name Healthcare Agent's Phone Number    19 0049  No, patient does not have an advance directive for healthcare treatment -- -- -- -- --          Admitting Physician:  Melissa Agrawal MD  PCP: Henrene Sacks, PA-C    Discharging Nurse: Avinash Unit/Room#: 9042/8853-47  Discharging Unit Phone Number: [phone]    Emergency Contact:   Extended Emergency Contact Information  Primary Emergency Contact: Christina Pruitt  Address: [address] Phone: [phone]  Mobile Phone: [phone]  Relation: Other    Past Surgical History:  Past Surgical History:   Procedure Laterality Date    ARTERIAL BYPASS SURGRY Left 2016    Axillary/Subclavian to Brachial Bypass w/reversed SVG    CARDIAC CATHETERIZATION  2018    Dr. Yudy Mcclure - at [facility]      pancreatitis post surgery    CORONARY ANGIOPLASTY WITH STENT PLACEMENT  2018    MELODY- 3.5 x 38 and 3.5 x 20 to Cx    CORONARY ANGIOPLASTY WITH STENT PLACEMENT  2018    MELODY- 3.0 x 38 and 2.75 x 26 and 2.75 x 8 and 2.75 x 22 to the LAD    FEMORAL BYPASS Right 2019    RIGHT FEMORAL POPLITEAL ARTERY BYPASS GRAFT performed by Porfirio More MD at [address]  2019    CardioMEMs insertion for CHF    ROTATOR CUFF REPAIR Left 2016    TONSILLECTOMY      TUMOR EXCISION      benign tumors X 2 chest & axilla    UPPER GASTROINTESTINAL ENDOSCOPY  2013    BX barretts, HH, gastritis    UPPER GASTROINTESTINAL ENDOSCOPY  12/10/2015    UPPER GASTROINTESTINAL ENDOSCOPY 01/06/2017    Gastritis       Immunization History:   Immunization History   Administered Date(s) Administered    Influenza Vaccine, unspecified formulation 11/04/2016    Influenza Virus Vaccine 12/11/2015, 11/21/2017    Influenza, Jostin Bulls, 3 Years and older, IM (Fluzone 3 yrs and older or Afluria 5 yrs and older) 12/19/2013, 10/14/2016, 11/09/2017    Pneumococcal Polysaccharide ([id number]) 12/11/2015       Active Problems:  Patient Active Problem List   Diagnosis Code    Severe claudication ([facility]) I73.9    DM2/IDDM E11.9    HTN (hypertension) I10    CLINT (obstructive sleep apnea) G47.33    Tobacco smoker Z72.0    PVD (peripheral vascular disease) with claudication ([facility]) I73.9    Hypotension I95.9    Volume depletion E86.9    GERD (gastroesophageal reflux disease) K21.9    Hyperlipidemia E78.5    Anxiety and depression F41.9, F32.9    Presyncope R42, R55    Hypochloremia E87.8    Normocytic anemia D64.9    Hypomagnesemia E83.42    Acute hyperglycemia R73.9    CAD (coronary artery disease) I25.10    PVD (peripheral vascular disease) ([facility]) S34.5    Acute systolic CHF (congestive heart failure), NYHA class 4 ([facility]) I50.21    Mitral regurgitation I34.0    Myocardiopathy ([facility]) I42.9    Palpitations R00.2    Chronic systolic congestive heart failure ([facility]) I50.22    CKD (chronic kidney disease) stage 3, GFR 30-59 ml/min ([facility]) N18.3    DKA, type 1, not at goal [facility]) E10.10    Hypokalemia E87.6    Acute renal failure with tubular necrosis ([facility]) N17.0    Claudication in peripheral vascular disease ([facility]) I73.9    Hypotension I95.9    COPD (chronic obstructive pulmonary disease) ([facility]) J44.9    Cellulitis L03.90       Isolation/Infection:   Isolation          No Isolation            Nurse Assessment:  Last Vital Signs: BP 99/78   Pulse 88   Temp 97 °F (36.1 °C) (Oral)   Resp 16   Ht 5' 4.5\" (1.638 m)   Wt 230 lb (104.3 kg)   LMP 10/24/2012   SpO2 98%   BMI 38.87 kg/m²     Last documented pain score (0-10 scale): Pain Level: 0  Last Weight:   Wt Readings from Last 1 Encounters:   06/01/19 230 lb (104.3 kg)     Mental Status:  oriented    IV Access:  - None    Nursing Mobility/ADLs:  Walking   Independent  Transfer  Independent  Bathing  Independent  Dressing  Independent  Toileting  Independent  Feeding  Independent  Med Admin  Independent  Med Delivery   whole    Wound Care Documentation and Therapy:  Negative Pressure Wound Therapy Groin Right (Active)   Wound Type Surgical 6/2/2019  4:00 AM   Dressing Type Black foam 5/18/2019  8:31 AM   Dressing Status Clean;Dry; Intact 5/18/2019  8:31 AM   Drainage Amount None 5/18/2019  8:31 AM   Output (ml) 0 ml 5/16/2019  8:19 PM   Wound Assessment GEORGE 6/2/2019  4:00 AM   Shaye-wound Assessment GEORGE 5/18/2019  8:31 AM   Odor None 5/18/2019  8:31 AM   Number of days: 18        Elimination:  Continence:   · Bowel: Yes  · Bladder: Yes  Urinary Catheter: None   Colostomy/Ileostomy/Ileal Conduit: No       Date of Last BM: ***    Intake/Output Summary (Last 24 hours) at 6/3/2019 1203  Last data filed at 6/3/2019 0803  Gross per 24 hour   Intake 1000 ml   Output 5450 ml   Net -4450 ml     I/O last 3 completed shifts: In: 1300 [P.O.:1250; IV Piggyback:50]  Out: 2417 [Urine:5550]    Safety Concerns:     None    Impairments/Disabilities:      None    Nutrition Therapy:  Current Nutrition Therapy:   - Oral Diet:  Carb Control 3 carbs/meal (1500kcals/day)    Routes of Feeding: Oral  Liquids: Thin Liquids  Daily Fluid Restriction: no  Last Modified Barium Swallow with Video (Video Swallowing Test): not done    Treatments at the Time of Hospital Discharge:   Respiratory Treatments: none  Oxygen Therapy:  is not on home oxygen therapy. Ventilator:    - No ventilator support    Rehab Therapies: Physical Therapy and Occupational Therapy  Weight Bearing Status/Restrictions: No weight bearing restirctions  Other Medical Equipment (for information only, NOT a DME order):   Wound vac  Other Treatments: none    Patient's personal belongings (please select all that are sent with patient):  None    RN SIGNATURE:  Electronically signed by Marck Quispe RN on 6/3/19 at 12:06 PM    CASE MANAGEMENT/SOCIAL WORK SECTION    Inpatient Status Date: ***    Readmission Risk Assessment Score:  Readmission Risk              Risk of Unplanned Readmission:        36           Discharging to Facility/ Agency   · Name: [facility]  · Address:  · Phone: [phone]  · Fax:    Dialysis Facility (if applicable)   · Name:  · Address:  · Dialysis Schedule:  · Phone:  · Fax:    / signature: {Esignature:[id number]}    PHYSICIAN SECTION    Prognosis: {Prognosis:4841185628}    Condition at Discharge: [address] [place] [address] Patient Condition:353675114}    Rehab Potential (if transferring to Rehab): {Prognosis:6237741686}    Recommended Labs or Other Treatments After Discharge: ***    Physician Certification: I certify the above information and transfer of Mor Christianson  is necessary for the continuing treatment of the diagnosis listed and that she requires {Admit to Appropriate Level of Care:93834} for {GREATER/LESS:175134084} 30 days.      Update Admission H&P: {CHP DME Changes in XFSGX:395933010}    PHYSICIAN SIGNATURE:  {Esignature:[id number]}

## 2019-06-03 NOTE — PROGRESS NOTES
Vascular Surgery  Wound Care Note    Wound assessed at the bedside. Wound vac placed to wound bed with black foam at base and drape to protect skin. Appropriate suction noted at 125 mmHg. Patient tolerated placement well. Awaiting insurance approval for home wound vac. Anticipate discharge home this afternoon.      Rossy Faith MD  6/3/2019  10:21 AM  [phone]

## 2019-06-03 NOTE — CARE COORDINATION
Case Management Admission Assessment     6/3/2019  [facility])  Clinical Case Management Department    Patient: Tj Hurst  MRN: [id number] / : 1965  ACCT: [de-identified]        Admission Documentation  Attending Provider: Chris Christensen MD  Admit date/time: 2019 11:44 PM  Status: Inpatient [101]  Diagnosis: Cellulitis     Readmission within last 30 days:  no     SW following patient for discharge planning. Patient is a 53 yo female admitted for cellulitis. Patient recently had a right femoral to popliteal bypass with reverse greater saphenous vein with Dr. Gloria Fraser at [facility] on . Patient has a history of CHF, COPD, CAD, HTN and HLD. SW met with patient this morning to complete assessment. PTA patient living at home independently with her daughter and a friend. SUSANNE updated by resident-Dr. Zarina Yung that patient will need wound vac at discharge. [facility] paperwork completed signed by Dr. Luci Kim. Clinicals and form faxed to [facility] ([phone]) and confirmed with Tosha Friday ([phone]). Referral made to [address] for nursing for wound care. Plan would be to discharge home today once wound vac arranged. ADDENDUM 8011: Wound vac not yet approved by insurance per [facility]. Patient to have [address]  [phone] for skilled nursing services. Farhat updated. ADDENDUM 163: Wound vac approved (Serial # [id number]). SW called down to central supply and left message. Farhat given paperwork and patient signed her own.        PTA Living Situation  Discharge Planning  Living Arrangements: dtr and friend  Support Systems: Family Members, Children  Patient expects to be discharged to[de-identified] Home  Expected Discharge Date: 19    Service Assessment    Values / Beliefs  Do you have any ethnic, cultural, sacramental, or spiritual Holiness needs you would like us to be aware of while you are in the hospital?: No    Advance Directives (For Healthcare)  Healthcare Directive: No, patient does not have an advance directive for healthcare treatment    [address] care at home? No    Home Medical Care    Pharmacy: [facility]  Potential Assistance Purchasing Medications:  No  Does patient want to participate in local refill/meds to beds program?: Yes    Discharge Plan   Patient expects to be discharged to[de-identified] Home    Barriers to discharge:  Wound vac set up    Role of Case Management discussed with patient/family/designee.      KATIE Melgar, [facility]  Case Management Department  Ph: [phone]

## 2019-06-03 NOTE — CARE COORDINATION
[facility] spoke to patient regarding HC. Patient lives in [place].  Faxed orders to [facility] [phone] Electronically signed by Stanley Avalos LPN on 3/4/1189 at 32:96 PM

## 2019-06-05 ENCOUNTER — OFFICE VISIT (OUTPATIENT)
Dept: CARDIOLOGY CLINIC | Age: 54
End: 2019-06-05
Payer: COMMERCIAL

## 2019-06-05 VITALS
HEART RATE: 96 BPM | DIASTOLIC BLOOD PRESSURE: 64 MMHG | WEIGHT: 230 LBS | BODY MASS INDEX: 39.27 KG/M2 | OXYGEN SATURATION: 95 % | HEIGHT: 64 IN | SYSTOLIC BLOOD PRESSURE: 118 MMHG

## 2019-06-05 DIAGNOSIS — N18.30 TYPE 2 DIABETES MELLITUS WITH STAGE 3 CHRONIC KIDNEY DISEASE, WITH LONG-TERM CURRENT USE OF INSULIN (HCC): ICD-10-CM

## 2019-06-05 DIAGNOSIS — Z79.4 TYPE 2 DIABETES MELLITUS WITH STAGE 3 CHRONIC KIDNEY DISEASE, WITH LONG-TERM CURRENT USE OF INSULIN (HCC): ICD-10-CM

## 2019-06-05 DIAGNOSIS — I73.9 PVD (PERIPHERAL VASCULAR DISEASE) (HCC): ICD-10-CM

## 2019-06-05 DIAGNOSIS — Z72.0 TOBACCO ABUSE: ICD-10-CM

## 2019-06-05 DIAGNOSIS — I25.5 ISCHEMIC CARDIOMYOPATHY: ICD-10-CM

## 2019-06-05 DIAGNOSIS — I50.22 CHRONIC SYSTOLIC CONGESTIVE HEART FAILURE (HCC): Primary | ICD-10-CM

## 2019-06-05 DIAGNOSIS — E11.22 TYPE 2 DIABETES MELLITUS WITH STAGE 3 CHRONIC KIDNEY DISEASE, WITH LONG-TERM CURRENT USE OF INSULIN (HCC): ICD-10-CM

## 2019-06-05 DIAGNOSIS — N18.30 CKD (CHRONIC KIDNEY DISEASE) STAGE 3, GFR 30-59 ML/MIN (HCC): ICD-10-CM

## 2019-06-05 DIAGNOSIS — E78.2 MIXED HYPERLIPIDEMIA: ICD-10-CM

## 2019-06-05 DIAGNOSIS — I10 ESSENTIAL HYPERTENSION: ICD-10-CM

## 2019-06-05 DIAGNOSIS — I25.10 CORONARY ARTERY DISEASE INVOLVING NATIVE CORONARY ARTERY OF NATIVE HEART WITHOUT ANGINA PECTORIS: ICD-10-CM

## 2019-06-05 DIAGNOSIS — I34.0 MITRAL VALVE INSUFFICIENCY, UNSPECIFIED ETIOLOGY: ICD-10-CM

## 2019-06-05 PROCEDURE — 3046F HEMOGLOBIN A1C LEVEL >9.0%: CPT | Performed by: NURSE PRACTITIONER

## 2019-06-05 PROCEDURE — 2022F DILAT RTA XM EVC RTNOPTHY: CPT | Performed by: NURSE PRACTITIONER

## 2019-06-05 PROCEDURE — G8427 DOCREV CUR MEDS BY ELIG CLIN: HCPCS | Performed by: NURSE PRACTITIONER

## 2019-06-05 PROCEDURE — 99214 OFFICE O/P EST MOD 30 MIN: CPT | Performed by: NURSE PRACTITIONER

## 2019-06-05 PROCEDURE — 4004F PT TOBACCO SCREEN RCVD TLK: CPT | Performed by: NURSE PRACTITIONER

## 2019-06-05 PROCEDURE — 1111F DSCHRG MED/CURRENT MED MERGE: CPT | Performed by: NURSE PRACTITIONER

## 2019-06-05 PROCEDURE — 3017F COLORECTAL CA SCREEN DOC REV: CPT | Performed by: NURSE PRACTITIONER

## 2019-06-05 PROCEDURE — G8598 ASA/ANTIPLAT THER USED: HCPCS | Performed by: NURSE PRACTITIONER

## 2019-06-05 PROCEDURE — G8417 CALC BMI ABV UP PARAM F/U: HCPCS | Performed by: NURSE PRACTITIONER

## 2019-06-05 ASSESSMENT — ENCOUNTER SYMPTOMS
COUGH: 1
SHORTNESS OF BREATH: 1

## 2019-06-05 NOTE — PATIENT INSTRUCTIONS
[address]   Cardiac Evaluation    Primary Care Doctor:  Celia Ruiz [place]    Chief Complaint   Patient presents with   [address] from [facility]     06/01-06/03/2019 infection @ femoral artery bypass site - has wound vac    Congestive Heart Failure    Coronary Artery Disease    Hyperlipidemia    Other     PVD    Results     EKG 05/16/2019 & CXR 05/07/2019    Discuss Labs     BNP & CMP 05/07/2019 CBC, MAG & RENAL 06/03/2019    Edema     feels gaining weight from eating all the time        History of Present Illness:   I had the pleasure of seeing Juju Villalobos in follow up for CAD, MR, sCHF, ICM, HLD, HTN as well as PAD, DM, CLINT and smoker. She underwent placement of CardioMEMS on 0/36/00 without complication. She was seen by Dr. Mellisa Escamilla then Dr. Tawana Klein for severe claudication pain and PAD, underwent right fem bypass on 5/16/19. She was hospitalized 6/1/19 to 6/3/19 for wound dehiscence and cellulitis, started on antibiotics and wound vac applied. She was to be followed by home care for wound vac management but has not been contacted. She is doing fair. The leg pain is a bit better. Her edema is okay, still worse in RLE. Her abdominal distention is stable. Her sleep is still poor, chronic. Breathing is okay. Blood sugars were elevated (insulin held during hospitalization) but better now (200's). + wet cough, productive of yellow brown sputum. Her CARDIOMEMS pressures were elevated (PAD 33) but have recently decreased back to baseline (PAD 26). Of note, her diuretics were stopped then restarted and increased. Juju Villalobos describes symptoms including dyspnea, orthopnea, fatigue, edema but denies chest pain, palpitations, PND, early saiety, syncope.      NYHA:   III  ACC/ AHA Stage:    C    Past Medical History:   has a past medical history of Anxiety and depression, CAD (coronary artery disease), Cardiomyopathy ([address] CHF (congestive heart failure) ([address] COPD (chronic obstructive pulmonary disease) ([facility] Diabetes mellitus ([facility] GERD (gastroesophageal reflux disease), Hyperlipidemia, Hypertension, MI (myocardial infarction) ([facility] Peripheral neuropathy, Peripheral vascular disease ([facility] Pulmonary HTN ([facility] Reflux, Sleep apnea, and Wears glasses. Surgical History:   has a past surgical history that includes Tonsillectomy; Cholecystectomy; tumor excision; Upper gastrointestinal endoscopy (4/25/2013); Upper gastrointestinal endoscopy (12/10/2015); Upper gastrointestinal endoscopy (01/06/2017); Arterial bypass surgry (Left, 01/06/2016); Cardiac catheterization (03/27/2018); Coronary angioplasty with stent (03/16/2018); Rotator cuff repair (Left, 04/22/2016); Coronary angioplasty with stent (01/03/2018); Insertable Cardiac Monitor (02/14/2019); and femoral bypass (Right, 5/16/2019). Social History:   reports that she has been smoking cigarettes. She has a 7.50 pack-year smoking history. She has never used smokeless tobacco. She reports that she does not drink alcohol or use drugs. Family History:   Family History   Problem Relation Age of Onset    Heart Disease Maternal Grandmother     Cancer Mother         lung and brain cancer    Cancer Maternal Aunt         lung and brain cancer       Home Medications:  Prior to Admission medications    Medication Sig Start Date End Date Taking?  Authorizing Provider   clindamycin (CLEOCIN) 150 MG capsule Take 2 capsules by mouth 3 times daily for 12 days 6/3/19 6/15/19 Yes Tia Elkins MD   metolazone (ZAROXOLYN) 5 MG tablet TAKE 1 TABLET BY MOUTH EVERY DAY 5/28/19  Yes Christian Ernst MD   torsemide [facility]) 100 MG tablet Take 0.5 tablets by mouth daily 5/2/19  Yes TRAY Corey - CNP   magnesium oxide (MAG-OX) 400 (240 Mg) MG tablet TAKE 1 TABLET ONCE DAILY 5/1/19  Yes Christian Ernst MD   insulin glargine (LANTUS) 100 UNIT/ML injection vial Inject 35 Units into the skin 2 times daily 4/9/19  Yes Bryan Espino MD   insulin lispro (HUMALOG KWIKPEN) 100 UNIT/ML pen Inject 15 Units into the skin 3 times daily (before meals) 4/9/19  Yes Bryan Espino MD   Insulin Pen Needle (B-D ULTRAFINE III SHORT PEN) 31G X 8 MM MISC Five shots a day 4/9/19  Yes Bryan Espino MD   digoxin (LANOXIN) 125 MCG tablet Take 1 tablet by mouth daily 4/3/19  Yes TRAY Rivera CNP   spironolactone (ALDACTONE) 100 MG tablet Take 1 tablet by mouth 2 times daily 3/13/19  Yes TRAY Allan CNP   albuterol sulfate HFA (VENTOLIN HFA) 108 (90 Base) MCG/ACT inhaler Inhale 2 puffs into the lungs every 6 hours as needed for Wheezing or Shortness of Breath 3/6/19  Yes Belinda Mayberry MD   sacubitril-valsartan (ENTRESTO) 24-26 MG per tablet Take 0.5 tablets by mouth every evening 2/25/19  Yes TRAY Rivera CNP   potassium chloride (KLOR-CON M) 20 MEQ extended release tablet Take 2 tablets by mouth 3 times daily  Patient taking differently: Take 40 mEq by mouth 2 times daily  2/6/19  Yes TRAY Rivera CNP   clopidogrel (PLAVIX) 75 MG tablet Take 1 tablet by mouth daily 1/31/19  Yes TRAY Rivera CNP   tiotropium (SPIRIVA RESPIMAT) 2.5 MCG/ACT AERS inhaler Inhale 2 puffs into the lungs daily 12/4/18  Yes Belinda Mayberry MD   carvedilol (COREG) 3.125 MG tablet Take 1 tablet by mouth 2 times daily (with meals) 11/28/18  Yes TRAY Rivera CNP   atorvastatin (LIPITOR) 80 MG tablet Take 80 mg by mouth nightly 5/2/18  Yes Historical Provider, MD   busPIRone (BUSPAR) 30 MG tablet Take 30 mg by mouth 3 times daily 4/2/18  Yes Historical Provider, MD   metFORMIN (GLUCOPHAGE-XR) 500 MG extended release tablet Take 1,000 mg by mouth 2 times daily 4/2/18  Yes Historical Provider, MD   doxepin (SINEQUAN) 50 MG capsule Take 50 mg by mouth nightly   Yes Historical Provider, MD   aspirin EC 81 MG EC tablet Take 1 tablet by mouth daily 1/8/16  Yes Nancy Butcher MD   pantoprazole office    CBC:   Lab Results   Component Value Date    WBC 9.7 06/03/2019    WBC 9.1 06/02/2019    WBC 10.0 06/02/2019    RBC 3.87 06/03/2019    RBC 4.10 06/02/2019    RBC 4.06 06/02/2019    HGB 11.6 06/03/2019    HGB 12.4 06/02/2019    HGB 12.1 06/02/2019    HCT 34.4 06/03/2019    HCT 36.8 06/02/2019    HCT 36.4 06/02/2019    MCV 88.7 06/03/2019    MCV 89.8 06/02/2019    MCV 89.6 06/02/2019    RDW 14.8 06/03/2019    RDW 14.8 06/02/2019    RDW 15.0 06/02/2019     06/03/2019     06/02/2019     06/02/2019     BMP:  Lab Results   Component Value Date     06/03/2019     06/03/2019     06/02/2019    K 4.2 06/03/2019    K 2.9 06/03/2019    K 4.2 06/02/2019    K 3.5 04/19/2019    K 4.2 05/27/2018    K 4.2 05/24/2018    CL 80 06/03/2019    CL 81 06/03/2019    CL 87 06/02/2019    CO2 26 06/03/2019    CO2 29 06/03/2019    CO2 30 06/02/2019    PHOS 2.7 06/03/2019    PHOS 3.2 06/03/2019    PHOS 2.8 06/02/2019    BUN 23 06/03/2019    BUN 23 06/03/2019    BUN 22 06/02/2019    CREATININE 1.0 06/03/2019    CREATININE 1.1 06/03/2019    CREATININE 1.1 06/02/2019     BNP:   Lab Results   Component Value Date    PROBNP 1,384 05/07/2019    PROBNP 190 05/02/2019    PROBNP 977 04/19/2019     LIPID:   Lab Results   Component Value Date    TRIG 255 11/28/2018    TRIG 305 08/01/2017    HDL 25 11/28/2018    HDL 41 08/01/2017    LDLCALC 76 11/28/2018    LDLCALC see below 08/01/2017    LDLDIRECT 187 08/01/2017       Cardiac Imaging:  BLE ZACH's 4/18/19:   1. There is severe arterial insufficiency at rest involving the right lower    extremity. There occlusive disease of the right proximal superficial femoral    (noted stent) and mid posterior tibial arteries. There is a 50-74% stenosis    at 4the right deep femoral artery with evidence of inflow disease.    2. There is severe arterial insufficiency at rest involving the left lower    extremity.  There is occlusive disease of the left proximal superficial  femoral artery (noted stent). There is a 30-49% stenosis at the left deep    femoral artery with evidence of inflow disease.         ECHO 4/3/19:   Oletha Clack systolic function is mildly reduced with EF estimated at 40-45%.   There is severe HK of the apex and apical-septal segment.   Normal left ventricular diastolic filling pressure.   Mild mitral regurgitation.   Systolic pulmonary artery pressure (SPAP) estimated at 32mmHg (RA pressure 3mmHg). Arterial doppler studies 8/2/18:   Doreene Joni is no arterial insufficiency at rest involving the lower extremities    bilaterally, however, there is evidence to suggest calf vessel disease    bilaterally. [facility] 3/26/18 [employer] promus premier [id number] CCx and [id number] CCx. Echo: 5/23/18:    Ejection fraction is visually estimated to be 30-35 %. There is akinesis of the apex and inferior walls. Left ventricular size is mildly increased. Moderate mitral regurgitation. Moderate amount plaque is noted in the aorta. The left atrium is mildly dilated. 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 A bubble study was performed and fails to show evidence of shunting. Procedure performed: Transesophageal echocardiogram 5/25/18:    Findings:  Reduced left ventricular function with ejection fraction estimated at about 35% with apical and inferior akinesis    The cardiac valves show moderate mitral regurgitation  There is no evidence for an intracardiac shunt or intracardiac thrombus. Cardiac cath 5/25/18:   Procedure Performed:  1. Coronary angiogram  2. Left heart cath  3. Left ventriculogram  4. Right heart cath   Findings:  1. Patent LAD and CIRC stents              ~mild CAD  2. Reduced LVEF 30% with anterior and apical akinesis  3. Moderate pulmonary hyperension   Conclusion/plan of care:  1. Medical management    Assessment:    1. Chronic systolic congestive heart failure ([address]    2.  Coronary artery disease involving native coronary

## 2019-06-05 NOTE — PROGRESS NOTES
Danyel   Cardiac Evaluation    Primary Care Doctor:  Sofia Yusuf [place]    Chief Complaint   Patient presents with   [address] from [facility]     06/01-06/03/2019 infection @ femoral artery bypass site - has wound vac    Congestive Heart Failure    Coronary Artery Disease    Hyperlipidemia    Other     PVD    Results     EKG 05/16/2019 & CXR 05/07/2019    Discuss Labs     BNP & CMP 05/07/2019 CBC, MAG & RENAL 06/03/2019    Edema     feels gaining weight from eating all the time        History of Present Illness:   I had the pleasure of seeing Cas Torres in follow up for CAD, MR, sCHF, ICM, HLD, HTN as well as PAD, DM, CLINT and smoker. She underwent placement of CardioMEMS on 9/51/07 without complication. She was seen by Dr. Hadley Bryant then Dr. Emerald Yoo for severe claudication pain and PAD, underwent right fem bypass on 5/16/19. She was hospitalized 6/1/19 to 6/3/19 for wound dehiscence and cellulitis, started on antibiotics and wound vac applied. She was to be followed by home care for wound vac management but has not been contacted. She is doing fair. The leg pain is a bit better. Her edema is okay, still worse in RLE. Her abdominal distention is stable. Her sleep is still poor, chronic. Breathing is okay. Blood sugars were elevated (insulin held during hospitalization) but better now (200's). + wet cough, productive of yellow brown sputum. Her CARDIOMEMS pressures were elevated (PAD 33) but have recently decreased back to baseline (PAD 26). Of note, her diuretics were stopped then restarted and increased. Cas Torres describes symptoms including dyspnea, orthopnea, fatigue, edema but denies chest pain, palpitations, PND, early saiety, syncope.      NYHA:   III  ACC/ AHA Stage:    C    Past Medical History:   has a past medical history of Anxiety and depression, CAD (coronary artery disease), Cardiomyopathy ([address] CHF (congestive heart failure) ([address] COPD (chronic obstructive pulmonary disease) ([facility] Diabetes mellitus ([facility] GERD (gastroesophageal reflux disease), Hyperlipidemia, Hypertension, MI (myocardial infarction) ([facility] Peripheral neuropathy, Peripheral vascular disease ([facility] Pulmonary HTN ([facility] Reflux, Sleep apnea, and Wears glasses. Surgical History:   has a past surgical history that includes Tonsillectomy; Cholecystectomy; tumor excision; Upper gastrointestinal endoscopy (4/25/2013); Upper gastrointestinal endoscopy (12/10/2015); Upper gastrointestinal endoscopy (01/06/2017); Arterial bypass surgry (Left, 01/06/2016); Cardiac catheterization (03/27/2018); Coronary angioplasty with stent (03/16/2018); Rotator cuff repair (Left, 04/22/2016); Coronary angioplasty with stent (01/03/2018); Insertable Cardiac Monitor (02/14/2019); and femoral bypass (Right, 5/16/2019). Social History:   reports that she has been smoking cigarettes. She has a 7.50 pack-year smoking history. She has never used smokeless tobacco. She reports that she does not drink alcohol or use drugs. Family History:   Family History   Problem Relation Age of Onset    Heart Disease Maternal Grandmother     Cancer Mother         lung and brain cancer    Cancer Maternal Aunt         lung and brain cancer       Home Medications:  Prior to Admission medications    Medication Sig Start Date End Date Taking?  Authorizing Provider   clindamycin (CLEOCIN) 150 MG capsule Take 2 capsules by mouth 3 times daily for 12 days 6/3/19 6/15/19 Yes Chaitanya Corbett MD   metolazone (ZAROXOLYN) 5 MG tablet TAKE 1 TABLET BY MOUTH EVERY DAY 5/28/19  Yes Rosy Prakash MD   torsemide [facility]) 100 MG tablet Take 0.5 tablets by mouth daily 5/2/19  Yes TRAY Villarreal CNP   magnesium oxide (MAG-OX) 400 (240 Mg) MG tablet TAKE 1 TABLET ONCE DAILY 5/1/19  Yes Rosy Prakash MD   insulin glargine (LANTUS) 100 UNIT/ML injection vial Inject 35 Units into the skin 2 times daily 4/9/19  Yes office    CBC:   Lab Results   Component Value Date    WBC 9.7 06/03/2019    WBC 9.1 06/02/2019    WBC 10.0 06/02/2019    RBC 3.87 06/03/2019    RBC 4.10 06/02/2019    RBC 4.06 06/02/2019    HGB 11.6 06/03/2019    HGB 12.4 06/02/2019    HGB 12.1 06/02/2019    HCT 34.4 06/03/2019    HCT 36.8 06/02/2019    HCT 36.4 06/02/2019    MCV 88.7 06/03/2019    MCV 89.8 06/02/2019    MCV 89.6 06/02/2019    RDW 14.8 06/03/2019    RDW 14.8 06/02/2019    RDW 15.0 06/02/2019     06/03/2019     06/02/2019     06/02/2019     BMP:  Lab Results   Component Value Date     06/03/2019     06/03/2019     06/02/2019    K 4.2 06/03/2019    K 2.9 06/03/2019    K 4.2 06/02/2019    K 3.5 04/19/2019    K 4.2 05/27/2018    K 4.2 05/24/2018    CL 80 06/03/2019    CL 81 06/03/2019    CL 87 06/02/2019    CO2 26 06/03/2019    CO2 29 06/03/2019    CO2 30 06/02/2019    PHOS 2.7 06/03/2019    PHOS 3.2 06/03/2019    PHOS 2.8 06/02/2019    BUN 23 06/03/2019    BUN 23 06/03/2019    BUN 22 06/02/2019    CREATININE 1.0 06/03/2019    CREATININE 1.1 06/03/2019    CREATININE 1.1 06/02/2019     BNP:   Lab Results   Component Value Date    PROBNP 1,384 05/07/2019    PROBNP 190 05/02/2019    PROBNP 977 04/19/2019     LIPID:   Lab Results   Component Value Date    TRIG 255 11/28/2018    TRIG 305 08/01/2017    HDL 25 11/28/2018    HDL 41 08/01/2017    LDLCALC 76 11/28/2018    LDLCALC see below 08/01/2017    LDLDIRECT 187 08/01/2017       Cardiac Imaging:  BLE ZACH's 4/18/19:   1. There is severe arterial insufficiency at rest involving the right lower    extremity. There occlusive disease of the right proximal superficial femoral    (noted stent) and mid posterior tibial arteries. There is a 50-74% stenosis    at 4the right deep femoral artery with evidence of inflow disease.    2. There is severe arterial insufficiency at rest involving the left lower    extremity.  There is occlusive disease of the left proximal superficial  femoral artery (noted stent). There is a 30-49% stenosis at the left deep    femoral artery with evidence of inflow disease.         ECHO 4/3/19:   Brooke Schooling systolic function is mildly reduced with EF estimated at 40-45%.   There is severe HK of the apex and apical-septal segment.   Normal left ventricular diastolic filling pressure.   Mild mitral regurgitation.   Systolic pulmonary artery pressure (SPAP) estimated at 32mmHg (RA pressure 3mmHg). Arterial doppler studies 8/2/18:   Edd Gonzalez is no arterial insufficiency at rest involving the lower extremities    bilaterally, however, there is evidence to suggest calf vessel disease    bilaterally. [facility] 3/26/18 [employer] promus premier [id number] CCx and [id number] CCx. Echo: 5/23/18:    Ejection fraction is visually estimated to be 30-35 %. There is akinesis of the apex and inferior walls. Left ventricular size is mildly increased. Moderate mitral regurgitation. Moderate amount plaque is noted in the aorta. The left atrium is mildly dilated. 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 A bubble study was performed and fails to show evidence of shunting. Procedure performed: Transesophageal echocardiogram 5/25/18:    Findings:  Reduced left ventricular function with ejection fraction estimated at about 35% with apical and inferior akinesis    The cardiac valves show moderate mitral regurgitation  There is no evidence for an intracardiac shunt or intracardiac thrombus. Cardiac cath 5/25/18:   Procedure Performed:  1. Coronary angiogram  2. Left heart cath  3. Left ventriculogram  4. Right heart cath   Findings:  1. Patent LAD and CIRC stents              ~mild CAD  2. Reduced LVEF 30% with anterior and apical akinesis  3. Moderate pulmonary hyperension   Conclusion/plan of care:  1. Medical management    Assessment:    1. Chronic systolic congestive heart failure ([address]    2.  Coronary artery disease involving native coronary artery of native heart without angina pectoris    3. Essential hypertension    4. Mixed hyperlipidemia    5. PVD (peripheral vascular disease) ([facility]    6. Tobacco smoker    7. Mitral valve insufficiency, unspecified etiology    8. Ischemic cardiomyopathy    9. CKD (chronic kidney disease) stage 3, GFR 30-59 ml/min ([facility])    10. Type 2 diabetes mellitus with stage 3 chronic kidney disease, with long-term current use of insulin ([facility]      Call placed to Cam[phone] who states did not get the referral or any paperwork. Asked for paperwork and demographic information be faxed to them and will arrange for patient to be seen today. Patient has cut back on smoking but still struggling with complete cessation. Encouraged in efforts especially with ongoing vascular disease and infection. Plan:   1. Stay on the metolazone 5 mg daily, torsemide 50 mg daily and spironolactone 100 mg twice daily  2. Continue the potassium 2 tabs 3 times per day  3. No change in the carvedilol or Entresto  4. Follow up with me in 1 month   5. Will plan for you to see Dr. Rosemary Moyer as your cardiologist in future. You do not see Dr. Thony Sorenson or Dr. Susannah Downs in the office again. 6. Will send After Visit Summary from hospitalization to home care. They said can get out to see you today. I appreciate the opportunity of cooperating in the care of this individual.    TRAY Lynch - CNP, 6/5/2019, 9:43 AM       QUALITY MEASURES  1. Tobacco Cessation Counseling: Yes  2. Retake of BP if >140/90:   NA  3. Documentation to PCP/referring for new patient:  Sent to PCP at close of office visit  4. CAD patient on anti-platelet: Yes  5. CAD patient on STATIN therapy:  Yes  6.  Patient with CHF and aFib on anticoagulation:  NA

## 2019-06-06 NOTE — DISCHARGE SUMMARY
Discharge Summary      Patient:  Salvador Gant    Admit Date: 6/1/2019 11:44 PM    Discharge Date: 6/3/2019    Admitting Physician: Carla Egan MD     Discharge Physician: same    Admitting Diagnosis: surgical wound dehiscence and cellulitis of bilateral lower extremities. Discharge Diagnosis: same     Past Medical History:   Diagnosis Date    Anxiety and depression     CAD (coronary artery disease) 01/2018    Cardiomyopathy ([address]     CH[facility]congest[facility]) [facility])     COPD (chronic obstructive pulmonary disease) [facility]     Diabetes mellitus ([address]     GERD (gastroesophageal reflux disease)     Hyperlipidemia     Hypertension     MI (myocardial infarction) ([address]     Peripheral neuropathy     Peripheral vascular disease ([facility]     Pulmonary HTN ([facility]     Reflux     Sleep apnea     has never done sleep study;does not use CPAP    Wears glasses     for reading        Indication for Admission:   Salvador Gant is a 52 y.o. female with an extensive past medical history including pulmonary HTN, HTN, HLD, CHF, COPD, CAD s/p multiple MIs requiring numerous drug eluting stents (last placed 3/16/2018), severe PAD s/p bilateral SFA stents which occluded leading to progressive severe claudication, resulting in recent right femoral to popliteal bypass with reverse greater saphenous vein with Dr. Robert Whalen at [facility] on 5/16. She was admitted to [facility] with wound dehiscence of superior right thigh incision with noted bilateral lower extremity cellulitis. Hospital Course:   6/1/2019-  Started on Clindamycin at admission for history of MRSA infection in the past.  Wound dehiscence noted to be superficial in nature. Irrigated the bedside with placement of Aquacel AG. WBC 10.0 on admission. 6/2/2019- Patient overall feeling well. Glucose uncontrolled and restarted on home regimen. Aquacel AG continued to wound     6/3/2019-  Wound vac placed at the bedside. Lower extremity cellulitis improved. Patient moving around the room without difficulty. Stable for discharge home with home wound vac. Discharged with Clindamycin. Plan to follow up with Dr. Sunshine Hamlin next week in the outpatient setting. Procedures:  Wound vac placement 6/3    Consulting services:  Social work- home wound vac with wound care     Discharge physical exam:  Adine Salamanca; no acute distress; grooming appropriate  HEENT: Normocephalic/atraumatic; trachea midline; patent airway  Chest/Lungs: Normal effort; clear to auscultation bilaterally; no crackles, no wheezes, no rhonchi, no rubs  Cardiovascular: Regular rate and rhythm; no murmurs, no rubs, no gallops  Abdomen: Non-distended; soft; non-tender; no guarding, no rigidity  Skin: Warm and dry; bilateral lower extremity erythema is improving; RLE with ~3cm of superficial dehiscence with wound vac in place  Extremities: Bilateral pitting pedal edema, improved erythema below the knee   Neuro: A&Ox3; no focal deficits; sensation intact     Pulses     F PT DP   R + -/+ -/+   L + -/+ -/-    +, -/+ ,-/-     Disposition:  Home with home care     Condition at discharge: Stable    Discharge Instructions: See separate form    Patient Instructions:      Medication List      START taking these medications    clindamycin 150 MG capsule  Commonly known as:  CLEOCIN  Take 2 capsules by mouth 3 times daily for 12 days  Notes to patient:  Clindamycin (Cleocin®)   Use: treat infections. Side effects: belly pain, nausea, loose stools or vaginal yeast infections in females.         CHANGE how you take these medications    pantoprazole 40 MG tablet  Commonly known as:  PROTONIX  Take 1 tablet by mouth daily  What changed:  when to take this     potassium chloride 20 MEQ extended release tablet  Commonly known as:  KLOR-CON M  Take 2 tablets by mouth 3 times daily  What changed:  when to take this        CONTINUE taking these medications    albuterol sulfate  (90 Base) MCG/ACT inhaler  Commonly known as:  VENTOLIN HFA  Inhale 2 puffs into the lungs every 6 hours as needed for Wheezing or Shortness of Breath     ARIPiprazole 10 MG tablet  Commonly known as:  ABILIFY     aspirin EC 81 MG EC tablet  Take 1 tablet by mouth daily     atorvastatin 80 MG tablet  Commonly known as:  LIPITOR     busPIRone 30 MG tablet  Commonly known as:  BUSPAR     carvedilol 3.125 MG tablet  Commonly known as:  COREG  Take 1 tablet by mouth 2 times daily (with meals)     clopidogrel 75 MG tablet  Commonly known as:  PLAVIX  Take 1 tablet by mouth daily     digoxin 125 MCG tablet  Commonly known as:  LANOXIN  Take 1 tablet by mouth daily     doxepin 50 MG capsule  Commonly known as:  SINEQUAN     insulin glargine 100 UNIT/ML injection vial  Commonly known as:  LANTUS  Inject 35 Units into the skin 2 times daily     insulin lispro 100 UNIT/ML pen  Commonly known as:  HUMALOG KWIKPEN  Inject 15 Units into the skin 3 times daily (before meals)     Insulin Pen Needle 31G X 8 MM Misc  Commonly known as:  B-D ULTRAFINE III SHORT PEN  Five shots a day     lamoTRIgine 150 MG tablet  Commonly known as:  LAMICTAL     magnesium oxide 400 (240 Mg) MG tablet  Commonly known as:  MAG-OX  TAKE 1 TABLET ONCE DAILY     metFORMIN 500 MG extended release tablet  Commonly known as:  GLUCOPHAGE-XR     metolazone 5 MG tablet  Commonly known as:  ZAROXOLYN  TAKE 1 TABLET BY MOUTH EVERY DAY     sacubitril-valsartan 24-26 MG per tablet  Commonly known as:  ENTRESTO  Take 0.5 tablets by mouth every evening     spironolactone 100 MG tablet  Commonly known as:  ALDACTONE  Take 1 tablet by mouth 2 times daily     tiotropium 2.5 MCG/ACT Aers inhaler  Commonly known as:  SPIRIVA RESPIMAT  Inhale 2 puffs into the lungs daily     torsemide 100 MG tablet  Commonly known as:  DEMADEX  Take 0.5 tablets by mouth daily        STOP taking these medications    buprenorphine-naloxone 8-2 MG Subl SL tablet  Commonly known as:  SUBOXONE     cephALEXin 500 MG capsule  Commonly known as:  Fredy Ranks           Where to Get Your Medications      You can get these medications from any pharmacy    Bring a paper prescription for each of these medications  · clindamycin 150 MG capsule         Kristal Mortensen MD  06/05/19  10:47 PM   [phone]

## 2019-06-11 RX ORDER — CARVEDILOL 3.12 MG/1
TABLET ORAL
Qty: 60 TABLET | Refills: 5 | Status: SHIPPED | OUTPATIENT
Start: 2019-06-11 | End: 2019-11-25 | Stop reason: SDUPTHER

## 2019-06-14 ENCOUNTER — OFFICE VISIT (OUTPATIENT)
Dept: VASCULAR SURGERY | Age: 54
End: 2019-06-14

## 2019-06-14 VITALS
BODY MASS INDEX: 36.65 KG/M2 | SYSTOLIC BLOOD PRESSURE: 90 MMHG | WEIGHT: 220 LBS | DIASTOLIC BLOOD PRESSURE: 68 MMHG | HEIGHT: 65 IN

## 2019-06-14 DIAGNOSIS — Z09 POSTOP CHECK: Primary | ICD-10-CM

## 2019-06-14 PROCEDURE — 99024 POSTOP FOLLOW-UP VISIT: CPT | Performed by: SURGERY

## 2019-06-14 NOTE — PROGRESS NOTES
Outpatient Post-Op Visit  PATIENT NAME: Roberta Caballero     TODAY'S DATE: 6/14/2019    SUBJECTIVE:    Pt is seen in f/u after fem-pop bypass. Graft was placed in reversed fashion and tunneled deep due to high risk for wound infection. This did in fact occur and patient was treated by Dr Caprice Navas in my absence last week with superficial wound debridement and VAC placement. Currently on po antbx. Reports no significant drainage. No fevers. Foot feeling much better since bypass. .     OBJECTIVE:   VITALS:  BP 90/68 (Site: Right Upper Arm)   Ht 5' 4.5\" (1.638 m)   Wt 220 lb (99.8 kg)   LMP 10/24/2012   BMI 37.18 kg/m²                   INCISION: R groin incision with slight separation of skin laterally. No purulence. Midthigh incision with ~2cm separation. No purulence. Good granulation. Bilateral lower extremities with tense edema.    + Doppler signals DP and PT    ZACH 1.1          ASSESSMENT AND PLAN:  52 y.o. female status post bypass. Clinically graft patent. Post-op superficial wound infection- not unexpected. Recc stop wound VAC. Wet to Dry dressing BID  F/u 2 weeks.    Lisa Hernández MD, FACS

## 2019-06-19 ENCOUNTER — TELEPHONE (OUTPATIENT)
Dept: VASCULAR SURGERY | Age: 54
End: 2019-06-19

## 2019-06-24 ENCOUNTER — TELEPHONE (OUTPATIENT)
Dept: CARDIOLOGY CLINIC | Age: 54
End: 2019-06-24

## 2019-06-24 ENCOUNTER — OFFICE VISIT (OUTPATIENT)
Dept: ORTHOPEDIC SURGERY | Age: 54
End: 2019-06-24
Payer: COMMERCIAL

## 2019-06-24 VITALS
BODY MASS INDEX: 37.56 KG/M2 | DIASTOLIC BLOOD PRESSURE: 67 MMHG | HEART RATE: 76 BPM | WEIGHT: 220.02 LBS | SYSTOLIC BLOOD PRESSURE: 108 MMHG | HEIGHT: 64 IN

## 2019-06-24 DIAGNOSIS — M25.511 RIGHT SHOULDER PAIN, UNSPECIFIED CHRONICITY: Primary | ICD-10-CM

## 2019-06-24 DIAGNOSIS — I50.22 CHRONIC SYSTOLIC CONGESTIVE HEART FAILURE (HCC): Primary | ICD-10-CM

## 2019-06-24 PROCEDURE — 99243 OFF/OP CNSLTJ NEW/EST LOW 30: CPT | Performed by: ORTHOPAEDIC SURGERY

## 2019-06-24 PROCEDURE — 20610 DRAIN/INJ JOINT/BURSA W/O US: CPT | Performed by: ORTHOPAEDIC SURGERY

## 2019-06-24 NOTE — PROGRESS NOTES
SHOULDER CONSULTATION    Referring Provider: Jamie Adair PA-C    Primary Care Provider: Same    Chief Complaint    Pain (Right Shoulder pain no injury , RTC on left sx 2016 Dr Maryjo Noble , feels like injured seem way. )      History of Present Illness:  Bello Wagner is a 52 y.o. female who presents today for evaluation and consultation of 1 month of acute right shoulder pain. She is right-hand dominant. She is recently been under quite a bit of medical stress with a recent bypass surgery in her right leg for peripheral vascular disease. She is also working hard to control her type 2 diabetes. Obstructive sleep apnea. She denies a specific traumatic or inciting event with the right shoulder. She does state that it feels very similar to her left shoulder where she had a rotator cuff repair 2 years ago. She is quite a bit of pain in the upper trapezius however. Radiates to the base of the neck. She also has pain radiating down to the deltoid. Trouble with overhead movement. She denies true radicular symptoms or myelopathic symptoms. Pain Assessment  Location of Pain: Shoulder  Location Modifiers: Right  Severity of Pain: 10  Quality of Pain: Dull, Aching  Frequency of Pain: Constant  Aggravating Factors: Stretching, Straightening, Bending  Relieving Factors: Rest  Result of Injury: No  Work-Related Injury: No  Are there other pain locations you wish to document?: No    Medical History:  Patient's medications, allergies, past medical, surgical, social and family histories were reviewed and updated as appropriate. Review of Systems:  Pertinent items are noted in HPI  Review of systems reviewed from Patient History Form dated on June 24, 2019 and available in the patient's chart under the Media tab.      Vital Signs:  /67   Pulse 76   Ht 5' 4.17\" (1.63 m)   Wt 220 lb 0.3 oz (99.8 kg)   LMP 10/24/2012   BMI 37.56 kg/m²       General Exam:   Constitutional: Patient is adequately rotator cuff pathology. However early adhesive capsulitis should also be considered. Office Procedures:After careful consideration of the risks and benefits, the Right Shoulder   Subacromial   Joint was injected under sterile conditions and well-tolerated. The skin was sterilized initially with alcohol and subsequently with Betadine. 80 mg of triamcinalone and 2 mL of quarter percent plain Marcaine were utilized and injected with a 21-gauge needle. Injection was well tolerated. No local skin reactions. No adverse events. Initial response assessed. Please see associated injection template for [facility] #. Orders Placed This Encounter   Procedures    XR SHOULDER RIGHT (MIN 2 VIEWS)     Standing Status:   Future     Number of Occurrences:   1     Standing Expiration Date:   6/21/2020       Treatment Plan: We did a very nice discussion about the anatomy and mechanics of the shoulder. Numerous pain generators that could be responsible for the symptoms. Presently, she is working hard on her overall medical care and we have both    Decided that conservative first-line treatment would be best.  We have provided her with a therapeutic injection. Referral to physical therapy. Really focus on some myofascial treatments as well which I think may give her some relief. She is can work on this for the next 6 weeks. A detailed discussion of the nature of rotator cuff pathology was had with the patient. We outlined the fact that her cuff tears are very common in ageing population. We discussed some of the literature which has delineated the incidence of rotator cuff tears over time. The full spectrum of rotator cuff tears beginning with tendinopathy and extending to partial-thickness tears and full-thickness tears were notable. We also discussed available research which has shown good success rates with conservative treatment of rotator cuff pathology.   Conservative supportive care with heat, ice, topicals, the judicious use of anti-inflammatories and physical therapy was outlined. The process of patient compensation through muscular adaptations and physical therapy was notable. Finally, the indications for rotator cuff repair were discussed; failure of conservative treatment, significant functional weakness and symptoms affecting the quality of daily life. We appreciate the opportunity to care for this patient. The trust that is implicit in this referral does not go unnoticed. We will do our very best to provide high-quality care that goes above and beyond standards. Please feel free contact us with any questions or concerns about this patient.               [address] [facility] Partner [facility]

## 2019-06-24 NOTE — PROGRESS NOTES
6/24/19 10:25 AM     Location: Right shoulder      NDC: 7019-8196-82    Lot Number: [id number]    Comments: KENALOG 40        6/24/19 10:25 AM         NDC: 71783-189-74    Lot Number: [id number]    Comments: BUPIVACAINE . 25

## 2019-06-24 NOTE — TELEPHONE ENCOUNTER
Called and spoke with patient. CardioMEMS reviewed, noted PAD increasing from 26 to 30. She reports feeling terrible last few days. Feels like potassium is low with muscle cramps. Swelling is not going away since vascular surgery. Wound is not healing well. Breathing is stable. Encouraged to take an additional 2 potassium tabs today (8 total) then go back to 6 daily. Voiced understanding. Also offered to see patient on Friday 6/28/19 after appointment with Dr. Leoncio Perez and she agreed.    TRAY Kelley - CNP, 6/24/2019, 1:58 PM

## 2019-06-26 ENCOUNTER — PROCEDURE VISIT (OUTPATIENT)
Dept: CARDIOLOGY CLINIC | Age: 54
End: 2019-06-26
Payer: COMMERCIAL

## 2019-06-26 DIAGNOSIS — I50.22 CHRONIC SYSTOLIC CONGESTIVE HEART FAILURE (HCC): ICD-10-CM

## 2019-06-26 PROCEDURE — 93264 REM MNTR WRLS P-ART PRS SNR: CPT | Performed by: NURSE PRACTITIONER

## 2019-06-28 ENCOUNTER — OFFICE VISIT (OUTPATIENT)
Dept: VASCULAR SURGERY | Age: 54
End: 2019-06-28

## 2019-06-28 ENCOUNTER — OFFICE VISIT (OUTPATIENT)
Dept: CARDIOLOGY CLINIC | Age: 54
End: 2019-06-28
Payer: COMMERCIAL

## 2019-06-28 VITALS
WEIGHT: 220 LBS | DIASTOLIC BLOOD PRESSURE: 68 MMHG | HEIGHT: 65 IN | SYSTOLIC BLOOD PRESSURE: 120 MMHG | BODY MASS INDEX: 36.65 KG/M2

## 2019-06-28 VITALS
WEIGHT: 220 LBS | HEART RATE: 88 BPM | OXYGEN SATURATION: 98 % | BODY MASS INDEX: 50.91 KG/M2 | SYSTOLIC BLOOD PRESSURE: 120 MMHG | HEIGHT: 55 IN | DIASTOLIC BLOOD PRESSURE: 68 MMHG

## 2019-06-28 DIAGNOSIS — I73.9 PVD (PERIPHERAL VASCULAR DISEASE) (HCC): ICD-10-CM

## 2019-06-28 DIAGNOSIS — I10 ESSENTIAL HYPERTENSION: ICD-10-CM

## 2019-06-28 DIAGNOSIS — I25.10 CORONARY ARTERY DISEASE INVOLVING NATIVE CORONARY ARTERY OF NATIVE HEART WITHOUT ANGINA PECTORIS: ICD-10-CM

## 2019-06-28 DIAGNOSIS — I25.5 ISCHEMIC CARDIOMYOPATHY: ICD-10-CM

## 2019-06-28 DIAGNOSIS — Z72.0 TOBACCO ABUSE: ICD-10-CM

## 2019-06-28 DIAGNOSIS — N18.30 CKD (CHRONIC KIDNEY DISEASE) STAGE 3, GFR 30-59 ML/MIN (HCC): ICD-10-CM

## 2019-06-28 DIAGNOSIS — E11.22 TYPE 2 DIABETES MELLITUS WITH STAGE 3 CHRONIC KIDNEY DISEASE, WITH LONG-TERM CURRENT USE OF INSULIN (HCC): ICD-10-CM

## 2019-06-28 DIAGNOSIS — I50.22 CHRONIC SYSTOLIC CONGESTIVE HEART FAILURE (HCC): Primary | ICD-10-CM

## 2019-06-28 DIAGNOSIS — N18.30 TYPE 2 DIABETES MELLITUS WITH STAGE 3 CHRONIC KIDNEY DISEASE, WITH LONG-TERM CURRENT USE OF INSULIN (HCC): ICD-10-CM

## 2019-06-28 DIAGNOSIS — I34.0 MITRAL VALVE INSUFFICIENCY, UNSPECIFIED ETIOLOGY: ICD-10-CM

## 2019-06-28 DIAGNOSIS — G47.33 OSA (OBSTRUCTIVE SLEEP APNEA): ICD-10-CM

## 2019-06-28 DIAGNOSIS — Z09 POSTOP CHECK: Primary | ICD-10-CM

## 2019-06-28 DIAGNOSIS — Z79.4 TYPE 2 DIABETES MELLITUS WITH STAGE 3 CHRONIC KIDNEY DISEASE, WITH LONG-TERM CURRENT USE OF INSULIN (HCC): ICD-10-CM

## 2019-06-28 DIAGNOSIS — E78.2 MIXED HYPERLIPIDEMIA: ICD-10-CM

## 2019-06-28 PROCEDURE — G8427 DOCREV CUR MEDS BY ELIG CLIN: HCPCS | Performed by: NURSE PRACTITIONER

## 2019-06-28 PROCEDURE — 1111F DSCHRG MED/CURRENT MED MERGE: CPT | Performed by: NURSE PRACTITIONER

## 2019-06-28 PROCEDURE — 99024 POSTOP FOLLOW-UP VISIT: CPT | Performed by: SURGERY

## 2019-06-28 PROCEDURE — G8598 ASA/ANTIPLAT THER USED: HCPCS | Performed by: NURSE PRACTITIONER

## 2019-06-28 PROCEDURE — 2022F DILAT RTA XM EVC RTNOPTHY: CPT | Performed by: NURSE PRACTITIONER

## 2019-06-28 PROCEDURE — 3046F HEMOGLOBIN A1C LEVEL >9.0%: CPT | Performed by: NURSE PRACTITIONER

## 2019-06-28 PROCEDURE — 99214 OFFICE O/P EST MOD 30 MIN: CPT | Performed by: NURSE PRACTITIONER

## 2019-06-28 PROCEDURE — 4004F PT TOBACCO SCREEN RCVD TLK: CPT | Performed by: NURSE PRACTITIONER

## 2019-06-28 PROCEDURE — 3017F COLORECTAL CA SCREEN DOC REV: CPT | Performed by: NURSE PRACTITIONER

## 2019-06-28 PROCEDURE — G8417 CALC BMI ABV UP PARAM F/U: HCPCS | Performed by: NURSE PRACTITIONER

## 2019-06-28 RX ORDER — POTASSIUM CHLORIDE 20 MEQ/1
40 TABLET, EXTENDED RELEASE ORAL 4 TIMES DAILY
Qty: 180 TABLET | Refills: 3 | Status: SHIPPED
Start: 2019-06-28 | End: 2019-10-22 | Stop reason: SDUPTHER

## 2019-06-28 RX ORDER — TORSEMIDE 100 MG/1
100 TABLET ORAL 2 TIMES DAILY
Qty: 60 TABLET | Refills: 3 | Status: SHIPPED
Start: 2019-06-28 | End: 2019-07-19

## 2019-06-28 ASSESSMENT — ENCOUNTER SYMPTOMS
SHORTNESS OF BREATH: 1
COUGH: 1
WHEEZING: 1

## 2019-06-28 NOTE — PROGRESS NOTES
Outpatient Post-Op Visit  PATIENT NAME: Ynes Liang     TODAY'S DATE: 6/28/2019    SUBJECTIVE:    Pt is seen in f/u regarding open wounds after RLE bypass. She continues dressing with wet to dry. Reports cellulitis resolved. OBJECTIVE:   VITALS:  /68 (Site: Left Upper Arm)   Ht 5' 4.5\" (1.638 m)   Wt 220 lb (99.8 kg)   LMP 10/24/2012   BMI 37.18 kg/m²                 INCISION: wounds with good granulation. No cellulitis, no purulence. ASSESSMENT AND PLAN:  52 y.o. female status post RLE bypass. Continue local wound care. F/uy 2 weeks.      Elia Clements MD, FACS

## 2019-06-28 NOTE — PROGRESS NOTES
[facility]   Cardiac Evaluation    Primary Care Doctor:  Deena Vivas    Chief Complaint   Patient presents with    Follow-up    Shortness of Breath     waking up, sitting still    Chest Pain     mid sternal        History of Present Illness:   I had the pleasure of seeing Dennis Bynum in follow up for CAD, MR, sCHF, ICM, HLD, HTN as well as PAD, DM, CLINT and smoker. She underwent placement of CardioMEMS on 2/17/89 without complication. Her CardioMEMS pressures have been increasing so is being seen as a more urgent visit today. She reports increased shortness of breath with PND and orthopnea and chest pain. She took nitroglycerin which helped. This occurred despite sleeping in the chair. She has cut back on smoking to 10 cigs per day. She is using Chantix. Her weight at home today was 232 lbs. She has tight leg edema and abdominal distention. She has dyspnea on exertion with any walking. Dennis Bynum describes symptoms including chest pain, dyspnea, orthopnea, PND, fatigue, edema but denies palpitations, early saiety, syncope. NYHA:   IV  ACC/ AHA Stage:    C    Past Medical History:   has a past medical history of Anxiety and depression, CAD (coronary artery disease), Cardiomyopathy ([address] CHF (congestive heart failure) ([address] COPD (chronic obstructive pulmonary disease) ([address] Diabetes mellitus ([address] GERD (gastroesophageal reflux disease), Hyperlipidemia, Hypertension, MI (myocardial infarction) ([address] Peripheral neuropathy, Peripheral vascular disease ([address] Pulmonary HTN ([address] Reflux, Sleep apnea, and Wears glasses. Surgical History:   has a past surgical history that includes Tonsillectomy; Cholecystectomy; tumor excision; Upper gastrointestinal endoscopy (4/25/2013); Upper gastrointestinal endoscopy (12/10/2015); Upper gastrointestinal endoscopy (01/06/2017); Arterial bypass surgry (Left, 01/06/2016);  Cardiac catheterization (03/27/2018); Coronary angioplasty with stent (03/16/2018); Rotator cuff repair (Left, 04/22/2016); Coronary angioplasty with stent (01/03/2018); Insertable Cardiac Monitor (02/14/2019); and femoral bypass (Right, 5/16/2019). Social History:   reports that she has been smoking cigarettes. She has a 7.50 pack-year smoking history. She has never used smokeless tobacco. She reports that she does not drink alcohol or use drugs. Family History:   Family History   Problem Relation Age of Onset    Heart Disease Maternal Grandmother     Cancer Mother         lung and brain cancer    Cancer Maternal Aunt         lung and brain cancer       Home Medications:  Prior to Admission medications    Medication Sig Start Date End Date Taking?  Authorizing Provider   Varenicline Tartrate (CHANTIX PO) Take by mouth   Yes Historical Provider, MD   carvedilol (COREG) 3.125 MG tablet TAKE 1 TABLET BY MOUTH TWICE A DAY WITH MEALS 6/11/19  Yes TRAY Garza CNP   metolazone (ZAROXOLYN) 5 MG tablet TAKE 1 TABLET BY MOUTH EVERY DAY 5/28/19  Yes Chika Patel MD   torsemide [facility]) 100 MG tablet Take 0.5 tablets by mouth daily 5/2/19  Yes TRAY Rajan CNP   magnesium oxide (MAG-OX) 400 (240 Mg) MG tablet TAKE 1 TABLET ONCE DAILY 5/1/19  Yes Chika Patel MD   insulin glargine (LANTUS) 100 UNIT/ML injection vial Inject 35 Units into the skin 2 times daily 4/9/19  Yes Yovana Reyes MD   insulin lispro (HUMALOG KWIKPEN) 100 UNIT/ML pen Inject 15 Units into the skin 3 times daily (before meals) 4/9/19  Yes Yovana Reyes MD   Insulin Pen Needle (B-D ULTRAFINE III SHORT PEN) 31G X 8 MM MISC Five shots a day 4/9/19  Yes Yovana Reyes MD   digoxin (LANOXIN) 125 MCG tablet Take 1 tablet by mouth daily 4/3/19  Yes AnthonyTRAY Barlow CNP   spironolactone (ALDACTONE) 100 MG tablet Take 1 tablet by mouth 2 times daily 3/13/19  Yes TRAY Garza CNP   albuterol sulfate HFA (VENTOLIN HFA) 108 (90 Base) MCG/ACT inhaler Inhale 2 puffs into the lungs every 6 hours as needed for Wheezing or Shortness of Breath 3/6/19  Yes Ann Su MD   sacubitril-valsartan (ENTRESTO) 24-26 MG per tablet Take 0.5 tablets by mouth every evening 2/25/19  Yes TRAY Patterson CNP   potassium chloride (KLOR-CON M) 20 MEQ extended release tablet Take 2 tablets by mouth 3 times daily  Patient taking differently: Take 40 mEq by mouth 2 times daily  2/6/19  Yes TRAY Patterson - CNP   clopidogrel (PLAVIX) 75 MG tablet Take 1 tablet by mouth daily 1/31/19  Yes TRAY Patterson CNP   tiotropium (SPIRIVA RESPIMAT) 2.5 MCG/ACT AERS inhaler Inhale 2 puffs into the lungs daily 12/4/18  Yes Ann Su MD   atorvastatin (LIPITOR) 80 MG tablet Take 80 mg by mouth nightly 5/2/18  Yes Historical Provider, MD   busPIRone (BUSPAR) 30 MG tablet Take 30 mg by mouth 3 times daily 4/2/18  Yes Historical Provider, MD   metFORMIN (GLUCOPHAGE-XR) 500 MG extended release tablet Take 1,000 mg by mouth 2 times daily 4/2/18  Yes Historical Provider, MD   doxepin (SINEQUAN) 50 MG capsule Take 50 mg by mouth nightly   Yes Historical Provider, MD   aspirin EC 81 MG EC tablet Take 1 tablet by mouth daily 1/8/16  Yes Lisa Hernández MD   pantoprazole (PROTONIX) 40 MG tablet Take 1 tablet by mouth daily  Patient taking differently: Take 40 mg by mouth 2 times daily  12/11/15  Yes Leoncio Duque MD   ARIPiprazole (ABILIFY) 10 MG tablet Take 5 mg by mouth daily    Yes Historical Provider, MD   lamoTRIgine (LAMICTAL) 150 MG tablet Take 150 mg by mouth 2 times daily    Yes Historical Provider, MD        Allergies:  Lisinopril     Review of Systems:   Review of Systems   Constitutional: Positive for activity change, appetite change and fatigue. Eyes: Positive for visual disturbance. Respiratory: Positive for cough, shortness of breath and wheezing. Cardiovascular: Positive for chest pain and leg swelling.    Musculoskeletal: Positive for arthralgias. Skin: Positive for wound. Neurological: Positive for weakness. Negative for dizziness, syncope and light-headedness. Psychiatric/Behavioral: Positive for sleep disturbance. Physical Examination:    Vitals:    06/28/19 1350   BP: 120/68   Pulse: 88   SpO2: 98%   Weight: 220 lb (99.8 kg)   Height: 4' 5\" (1.346 m)        Constitutional and General Appearance: Warm and dry, no apparent distress, normal coloration  HEENT:  Normocephalic, atraumatic  Respiratory:  · Normal excursion and expansion without use of accessory muscles  · Resp Auscultation: faint inspiratory wheezes  Cardiovascular:  · The apical impulses not displaced  · Heart tones are crisp and normal  · JVP 12 cm cm H2O  · Regular rate and rhythm, normal S1S2, no m/g/r  · Peripheral pulses are symmetrical and full  · There is no clubbing, cyanosis of the extremities.   · 2+ BLE edema  · Pedal Pulses: 2+ and equal   Abdomen:  · No masses or tenderness  · Liver/Spleen: No Abnormalities Noted  Neurological/Psychiatric:  · Alert and oriented in all spheres  · Moves all extremities well  · Exhibits normal gait balance and coordination  · No abnormalities of mood, affect, memory, mentation, or behavior are noted    Lab Data:  Most recent lab results below reviewed in office    CBC:   Lab Results   Component Value Date    WBC 9.7 06/03/2019    WBC 9.1 06/02/2019    WBC 10.0 06/02/2019    RBC 3.87 06/03/2019    RBC 4.10 06/02/2019    RBC 4.06 06/02/2019    HGB 11.6 06/03/2019    HGB 12.4 06/02/2019    HGB 12.1 06/02/2019    HCT 34.4 06/03/2019    HCT 36.8 06/02/2019    HCT 36.4 06/02/2019    MCV 88.7 06/03/2019    MCV 89.8 06/02/2019    MCV 89.6 06/02/2019    RDW 14.8 06/03/2019    RDW 14.8 06/02/2019    RDW 15.0 06/02/2019     06/03/2019     06/02/2019     06/02/2019     BMP:  Lab Results   Component Value Date     06/03/2019     06/03/2019     06/02/2019    K 4.2 06/03/2019    K 2.9 06/03/2019    K 4.2 06/02/2019    K 3.5 04/19/2019    K 4.2 05/27/2018    K 4.2 05/24/2018    CL 80 06/03/2019    CL 81 06/03/2019    CL 87 06/02/2019    CO2 26 06/03/2019    CO2 29 06/03/2019    CO2 30 06/02/2019    PHOS 2.7 06/03/2019    PHOS 3.2 06/03/2019    PHOS 2.8 06/02/2019    BUN 23 06/03/2019    BUN 23 06/03/2019    BUN 22 06/02/2019    CREATININE 1.0 06/03/2019    CREATININE 1.1 06/03/2019    CREATININE 1.1 06/02/2019     BNP:   Lab Results   Component Value Date    PROBNP 1,384 05/07/2019    PROBNP 190 05/02/2019    PROBNP 977 04/19/2019     LIPID:   Lab Results   Component Value Date    TRIG 255 11/28/2018    TRIG 305 08/01/2017    HDL 25 11/28/2018    HDL 41 08/01/2017    LDLCALC 76 11/28/2018    LDLCALC see below 08/01/2017    LDLDIRECT 187 08/01/2017       Cardiac Imaging:  BLE ZACH's 4/18/19:   1. There is severe arterial insufficiency at rest involving the right lower    extremity. There occlusive disease of the right proximal superficial femoral    (noted stent) and mid posterior tibial arteries. There is a 50-74% stenosis    at 4the right deep femoral artery with evidence of inflow disease.    2. There is severe arterial insufficiency at rest involving the left lower    extremity. There is occlusive disease of the left proximal superficial    femoral artery (noted stent). There is a 30-49% stenosis at the left deep    femoral artery with evidence of inflow disease.         ECHO 4/3/19:   Deette Hinkle systolic function is mildly reduced with EF estimated at 40-45%.   There is severe HK of the apex and apical-septal segment.   Normal left ventricular diastolic filling pressure.   Mild mitral regurgitation.   Systolic pulmonary artery pressure (SPAP) estimated at 32mmHg (RA pressure 3mmHg). Arterial doppler studies 8/2/18:   Jhon Laundry is no arterial insufficiency at rest involving the lower extremities    bilaterally, however, there is evidence to suggest calf vessel disease    bilaterally.      [facility] 3/26/18 [employer] promus premier [id number] CCx and [id number] CCx. Echo: 5/23/18:    Ejection fraction is visually estimated to be 30-35 %. There is akinesis of the apex and inferior walls. Left ventricular size is mildly increased. Moderate mitral regurgitation. Moderate amount plaque is noted in the aorta. The left atrium is mildly dilated. 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 A bubble study was performed and fails to show evidence of shunting. Procedure performed: Transesophageal echocardiogram 5/25/18:    Findings:  Reduced left ventricular function with ejection fraction estimated at about 35% with apical and inferior akinesis    The cardiac valves show moderate mitral regurgitation  There is no evidence for an intracardiac shunt or intracardiac thrombus. Cardiac cath 5/25/18:   Procedure Performed:  1. Coronary angiogram  2. Left heart cath  3. Left ventriculogram  4. Right heart cath   Findings:  1. Patent LAD and CIRC stents              ~mild CAD  2. Reduced LVEF 30% with anterior and apical akinesis  3. Moderate pulmonary hyperension   Conclusion/plan of care:  1. Medical management    Assessment:    1. Chronic systolic congestive heart failure ([address]    2. Coronary artery disease involving native coronary artery of native heart without angina pectoris    3. Essential hypertension    4. Mixed hyperlipidemia    5. PVD (peripheral vascular disease) ([address]    6. Tobacco smoker    7. Mitral valve insufficiency, unspecified etiology    8. Ischemic cardiomyopathy    9. CKD (chronic kidney disease) stage 3, GFR 30-59 ml/min (HCC)    10. Type 2 diabetes mellitus with stage 3 chronic kidney disease, with long-term current use of insulin ([address]    11. CLINT (obstructive sleep apnea)          Plan:   1. Increase the torsemide to 100 mg twice daily (whole tablet)  2. Stay on the metolazone 5 mg daily and the spironolactone 100 mg twice daily  3.  Increase the potassium 20 mEq to 8

## 2019-06-28 NOTE — PATIENT INSTRUCTIONS
1. Increase the torsemide to 100 mg twice daily (whole tablet)  2. Stay on the metolazone 5 mg daily and the spironolactone 100 mg twice daily  3. Increase the potassium 20 mEq to 8 total per day  4. Will review CardioMEMs pressures on Monday and call to adjust diuretics  5. Have blood work for me and PCP on July 8 th  6.  Keep appointment with me on July 10 th for now, may consider cancelling depending on progress

## 2019-07-01 ENCOUNTER — TELEPHONE (OUTPATIENT)
Dept: CARDIOLOGY CLINIC | Age: 54
End: 2019-07-01

## 2019-07-05 ENCOUNTER — TELEPHONE (OUTPATIENT)
Dept: CARDIOLOGY CLINIC | Age: 54
End: 2019-07-05

## 2019-07-05 NOTE — TELEPHONE ENCOUNTER
Patient has tried to send a transmission several times today. She spoke with someone from the company and they will call her Monday 7/8/19 to take care of the issue. Patient wanted to let Gaspar  know she feels \"much better\".

## 2019-07-09 ENCOUNTER — TELEPHONE (OUTPATIENT)
Dept: CARDIOLOGY CLINIC | Age: 54
End: 2019-07-09

## 2019-07-09 NOTE — TELEPHONE ENCOUNTER
lmov asking pt to call back regarding if she did a cardiomems transmit? Per Note in chart was to have done on 7.8. 19.

## 2019-07-12 ENCOUNTER — TELEPHONE (OUTPATIENT)
Dept: CARDIOLOGY CLINIC | Age: 54
End: 2019-07-12

## 2019-07-12 NOTE — TELEPHONE ENCOUNTER
I do not monitor  Cardiomems. I was able to log on and see that she transmitted on 7/11/19. Kristal Hanson can obtain the info you need.

## 2019-07-15 RX ORDER — TIOTROPIUM BROMIDE INHALATION SPRAY 3.12 UG/1
SPRAY, METERED RESPIRATORY (INHALATION)
Qty: 1 INHALER | Refills: 5 | Status: SHIPPED | OUTPATIENT
Start: 2019-07-15 | End: 2020-02-25 | Stop reason: SDUPTHER

## 2019-07-17 ENCOUNTER — TELEPHONE (OUTPATIENT)
Dept: CARDIOLOGY CLINIC | Age: 54
End: 2019-07-17

## 2019-07-17 NOTE — TELEPHONE ENCOUNTER
Spoke with [employer]. She says she has \"some\" swelling, mainly her belly and right lower leg. No sob. She says she is more tired than usual. Her weight today was 222 lbs, yesterday 221 lbs. Tomorrow she is due to take 100 mg of torsemide, she is hoping this helps. I let her know Suzie Boss may be able to stop in a check on her this Friday during her visit with Dr. Amparo Espitia @ 1:30.

## 2019-07-17 NOTE — TELEPHONE ENCOUNTER
Please call patient and let her know her CardioMEMs pressures are coming through. Pressures were up a little on 15 th but back down to good range yesterday (16th). Please find out how she is feeling - swelling, breathing, weight. I could possible stop in and see her on Friday when she see's Dr. Marianela Navarrete.    Thanks, [employer]

## 2019-07-19 ENCOUNTER — OFFICE VISIT (OUTPATIENT)
Dept: VASCULAR SURGERY | Age: 54
End: 2019-07-19

## 2019-07-19 ENCOUNTER — TELEPHONE (OUTPATIENT)
Dept: CARDIOLOGY CLINIC | Age: 54
End: 2019-07-19

## 2019-07-19 VITALS
BODY MASS INDEX: 37.15 KG/M2 | SYSTOLIC BLOOD PRESSURE: 110 MMHG | DIASTOLIC BLOOD PRESSURE: 68 MMHG | HEIGHT: 65 IN | WEIGHT: 223 LBS

## 2019-07-19 DIAGNOSIS — Z09 POSTOP CHECK: Primary | ICD-10-CM

## 2019-07-19 DIAGNOSIS — Z95.828 H/O EXTREMITY BYPASS GRAFT: ICD-10-CM

## 2019-07-19 PROCEDURE — 99024 POSTOP FOLLOW-UP VISIT: CPT | Performed by: SURGERY

## 2019-07-19 RX ORDER — TORSEMIDE 100 MG/1
TABLET ORAL
Qty: 60 TABLET | Refills: 3
Start: 2019-07-19 | End: 2019-08-20

## 2019-08-06 ENCOUNTER — PROCEDURE VISIT (OUTPATIENT)
Dept: CARDIOLOGY CLINIC | Age: 54
End: 2019-08-06
Payer: COMMERCIAL

## 2019-08-06 DIAGNOSIS — I50.22 CHRONIC SYSTOLIC CONGESTIVE HEART FAILURE (HCC): ICD-10-CM

## 2019-08-06 PROCEDURE — 93264 REM MNTR WRLS P-ART PRS SNR: CPT | Performed by: NURSE PRACTITIONER

## 2019-08-07 ENCOUNTER — TELEPHONE (OUTPATIENT)
Dept: VASCULAR SURGERY | Age: 54
End: 2019-08-07

## 2019-08-14 ENCOUNTER — TELEPHONE (OUTPATIENT)
Dept: CARDIOLOGY CLINIC | Age: 54
End: 2019-08-14

## 2019-08-14 ENCOUNTER — HOSPITAL ENCOUNTER (OUTPATIENT)
Age: 54
Discharge: HOME | End: 2019-08-14
Payer: COMMERCIAL

## 2019-08-14 LAB
ANION GAP SERPL CALCULATED.3IONS-SCNC: 14 MMOL/L (ref 3–16)
BUN BLDV-MCNC: 26 MG/DL (ref 7–20)
CALCIUM SERPL-MCNC: 10 MG/DL (ref 8.3–10.6)
CHLORIDE BLD-SCNC: 89 MMOL/L (ref 99–110)
CO2: 30 MMOL/L (ref 21–32)
CREAT SERPL-MCNC: 1.2 MG/DL (ref 0.6–1.1)
GFR AFRICAN AMERICAN: 56
GFR NON-AFRICAN AMERICAN: 47
GLUCOSE BLD-MCNC: 240 MG/DL (ref 70–99)
POTASSIUM SERPL-SCNC: 4 MMOL/L (ref 3.5–5.1)
PRO-BNP: 1293 PG/ML (ref 0–124)
SODIUM BLD-SCNC: 133 MMOL/L (ref 136–145)

## 2019-08-14 PROCEDURE — 83880 ASSAY OF NATRIURETIC PEPTIDE: CPT

## 2019-08-14 PROCEDURE — 80048 BASIC METABOLIC PNL TOTAL CA: CPT

## 2019-08-14 PROCEDURE — 36415 COLL VENOUS BLD VENIPUNCTURE: CPT

## 2019-08-14 NOTE — TELEPHONE ENCOUNTER
Reviewed CardioMEMs pressure's - increasing PAD over past 3 days. Spoke to patient. Reports her weight has increased from 214 lbs to 227 lbs over past 2 weeks. Lots of swelling to RLE. Still taking torsemide 100 mg 4 times per week and 50 mg 3 times per week. She has seen PCP with blood work but results not available. Her blood sugars are coming down, average was 309 now down to 220. Recommended:   Increase torsemide to 100 mg once daily every day until PAD comes down  Have blood work per standing order. Patient has disability paperwork needing completed, will drop off at [place] office.

## 2019-08-15 ENCOUNTER — TELEPHONE (OUTPATIENT)
Dept: CARDIOLOGY CLINIC | Age: 54
End: 2019-08-15

## 2019-08-20 ENCOUNTER — TELEPHONE (OUTPATIENT)
Dept: CARDIOLOGY CLINIC | Age: 54
End: 2019-08-20

## 2019-08-20 DIAGNOSIS — I73.9 PVD (PERIPHERAL VASCULAR DISEASE) WITH CLAUDICATION (HCC): Primary | Chronic | ICD-10-CM

## 2019-08-20 RX ORDER — TORSEMIDE 100 MG/1
TABLET ORAL
Qty: 210 TABLET | Refills: 3 | Status: ON HOLD | OUTPATIENT
Start: 2019-08-20 | End: 2019-10-01 | Stop reason: SDUPTHER

## 2019-08-20 RX ORDER — TORSEMIDE 100 MG/1
TABLET ORAL
Qty: 210 TABLET | Refills: 3 | Status: CANCELLED | OUTPATIENT
Start: 2019-08-20

## 2019-08-20 NOTE — TELEPHONE ENCOUNTER
Refill on torsemide (DEMADEX) 100 MG tablet    Due to dose change pt is running short.     Pharmacy:  [facility] [address]

## 2019-08-20 NOTE — TELEPHONE ENCOUNTER
Paperwork was dropped off at [place] location. Savi Cid has inner office the paper work to Susan Fournier.  Awaiting on it's arrival.

## 2019-08-22 ENCOUNTER — TELEPHONE (OUTPATIENT)
Dept: CARDIOLOGY CLINIC | Age: 54
End: 2019-08-22

## 2019-08-27 ENCOUNTER — HOSPITAL ENCOUNTER (OUTPATIENT)
Age: 54
Discharge: HOME | End: 2019-08-27
Payer: COMMERCIAL

## 2019-08-27 ENCOUNTER — TELEPHONE (OUTPATIENT)
Dept: CARDIOLOGY CLINIC | Age: 54
End: 2019-08-27

## 2019-08-27 LAB
ANION GAP SERPL CALCULATED.3IONS-SCNC: 18 MMOL/L (ref 3–16)
BUN BLDV-MCNC: 26 MG/DL (ref 7–20)
CALCIUM SERPL-MCNC: 9.9 MG/DL (ref 8.3–10.6)
CHLORIDE BLD-SCNC: 84 MMOL/L (ref 99–110)
CO2: 29 MMOL/L (ref 21–32)
CREAT SERPL-MCNC: 1.5 MG/DL (ref 0.6–1.1)
GFR AFRICAN AMERICAN: 44
GFR NON-AFRICAN AMERICAN: 36
GLUCOSE BLD-MCNC: 515 MG/DL (ref 70–99)
POTASSIUM SERPL-SCNC: 3.9 MMOL/L (ref 3.5–5.1)
PRO-BNP: 2004 PG/ML (ref 0–124)
SODIUM BLD-SCNC: 131 MMOL/L (ref 136–145)

## 2019-08-27 PROCEDURE — 80048 BASIC METABOLIC PNL TOTAL CA: CPT

## 2019-08-27 PROCEDURE — 83880 ASSAY OF NATRIURETIC PEPTIDE: CPT

## 2019-08-27 PROCEDURE — 36415 COLL VENOUS BLD VENIPUNCTURE: CPT

## 2019-08-28 ENCOUNTER — TELEPHONE (OUTPATIENT)
Dept: CARDIOLOGY CLINIC | Age: 54
End: 2019-08-28

## 2019-09-03 RX ORDER — CLOPIDOGREL BISULFATE 75 MG/1
TABLET ORAL
Qty: 30 TABLET | Refills: 5 | Status: SHIPPED | OUTPATIENT
Start: 2019-09-03

## 2019-09-11 ENCOUNTER — TELEPHONE (OUTPATIENT)
Dept: CARDIOLOGY CLINIC | Age: 54
End: 2019-09-11

## 2019-09-16 ENCOUNTER — TELEPHONE (OUTPATIENT)
Dept: CARDIOLOGY CLINIC | Age: 54
End: 2019-09-16

## 2019-09-16 ENCOUNTER — HOSPITAL ENCOUNTER (OUTPATIENT)
Age: 54
Discharge: HOME | End: 2019-09-16
Payer: COMMERCIAL

## 2019-09-16 DIAGNOSIS — R06.02 SOB (SHORTNESS OF BREATH): ICD-10-CM

## 2019-09-16 DIAGNOSIS — I50.22 CHRONIC SYSTOLIC CONGESTIVE HEART FAILURE (HCC): Primary | ICD-10-CM

## 2019-09-16 DIAGNOSIS — I25.5 ISCHEMIC CARDIOMYOPATHY: ICD-10-CM

## 2019-09-16 LAB
ANION GAP SERPL CALCULATED.3IONS-SCNC: 17 MMOL/L (ref 3–16)
BUN BLDV-MCNC: 47 MG/DL (ref 7–20)
CALCIUM SERPL-MCNC: 9.5 MG/DL (ref 8.3–10.6)
CHLORIDE BLD-SCNC: 82 MMOL/L (ref 99–110)
CO2: 28 MMOL/L (ref 21–32)
CREAT SERPL-MCNC: 1.5 MG/DL (ref 0.6–1.1)
GFR AFRICAN AMERICAN: 43
GFR NON-AFRICAN AMERICAN: 36
GLUCOSE BLD-MCNC: 353 MG/DL (ref 70–99)
POTASSIUM SERPL-SCNC: 4.4 MMOL/L (ref 3.5–5.1)
PRO-BNP: 1894 PG/ML (ref 0–124)
SODIUM BLD-SCNC: 127 MMOL/L (ref 136–145)

## 2019-09-16 PROCEDURE — 83880 ASSAY OF NATRIURETIC PEPTIDE: CPT

## 2019-09-16 PROCEDURE — 80048 BASIC METABOLIC PNL TOTAL CA: CPT

## 2019-09-16 PROCEDURE — 36415 COLL VENOUS BLD VENIPUNCTURE: CPT

## 2019-09-17 ENCOUNTER — TELEPHONE (OUTPATIENT)
Dept: CARDIOLOGY CLINIC | Age: 54
End: 2019-09-17

## 2019-09-18 ENCOUNTER — OFFICE VISIT (OUTPATIENT)
Dept: ENDOCRINOLOGY | Age: 54
End: 2019-09-18
Payer: COMMERCIAL

## 2019-09-18 VITALS
OXYGEN SATURATION: 99 % | HEIGHT: 65 IN | HEART RATE: 88 BPM | WEIGHT: 233 LBS | DIASTOLIC BLOOD PRESSURE: 64 MMHG | BODY MASS INDEX: 38.82 KG/M2 | SYSTOLIC BLOOD PRESSURE: 94 MMHG

## 2019-09-18 DIAGNOSIS — I10 ESSENTIAL HYPERTENSION: Chronic | ICD-10-CM

## 2019-09-18 DIAGNOSIS — E78.2 MIXED HYPERLIPIDEMIA: Chronic | ICD-10-CM

## 2019-09-18 DIAGNOSIS — E11.22 TYPE 2 DIABETES MELLITUS WITH STAGE 3 CHRONIC KIDNEY DISEASE, WITH LONG-TERM CURRENT USE OF INSULIN (HCC): Primary | Chronic | ICD-10-CM

## 2019-09-18 DIAGNOSIS — N18.30 TYPE 2 DIABETES MELLITUS WITH STAGE 3 CHRONIC KIDNEY DISEASE, WITH LONG-TERM CURRENT USE OF INSULIN (HCC): Primary | Chronic | ICD-10-CM

## 2019-09-18 DIAGNOSIS — N18.30 CKD (CHRONIC KIDNEY DISEASE) STAGE 3, GFR 30-59 ML/MIN (HCC): ICD-10-CM

## 2019-09-18 DIAGNOSIS — Z72.0 TOBACCO ABUSE: Chronic | ICD-10-CM

## 2019-09-18 DIAGNOSIS — Z79.4 TYPE 2 DIABETES MELLITUS WITH STAGE 3 CHRONIC KIDNEY DISEASE, WITH LONG-TERM CURRENT USE OF INSULIN (HCC): Primary | Chronic | ICD-10-CM

## 2019-09-18 LAB — HBA1C MFR BLD: 9.2 %

## 2019-09-18 PROCEDURE — 95250 CONT GLUC MNTR PHYS/QHP EQP: CPT | Performed by: NURSE PRACTITIONER

## 2019-09-18 PROCEDURE — G8427 DOCREV CUR MEDS BY ELIG CLIN: HCPCS | Performed by: NURSE PRACTITIONER

## 2019-09-18 PROCEDURE — 2022F DILAT RTA XM EVC RTNOPTHY: CPT | Performed by: NURSE PRACTITIONER

## 2019-09-18 PROCEDURE — 99204 OFFICE O/P NEW MOD 45 MIN: CPT | Performed by: NURSE PRACTITIONER

## 2019-09-18 PROCEDURE — 4004F PT TOBACCO SCREEN RCVD TLK: CPT | Performed by: NURSE PRACTITIONER

## 2019-09-18 PROCEDURE — G8598 ASA/ANTIPLAT THER USED: HCPCS | Performed by: NURSE PRACTITIONER

## 2019-09-18 PROCEDURE — G8417 CALC BMI ABV UP PARAM F/U: HCPCS | Performed by: NURSE PRACTITIONER

## 2019-09-18 PROCEDURE — 83036 HEMOGLOBIN GLYCOSYLATED A1C: CPT | Performed by: NURSE PRACTITIONER

## 2019-09-18 PROCEDURE — 3017F COLORECTAL CA SCREEN DOC REV: CPT | Performed by: NURSE PRACTITIONER

## 2019-09-18 PROCEDURE — 3046F HEMOGLOBIN A1C LEVEL >9.0%: CPT | Performed by: NURSE PRACTITIONER

## 2019-09-18 ASSESSMENT — ENCOUNTER SYMPTOMS
SHORTNESS OF BREATH: 0
NAUSEA: 0
DIARRHEA: 0
CONSTIPATION: 0
EYE PAIN: 0
COLOR CHANGE: 0

## 2019-09-18 NOTE — PROGRESS NOTES
dysphoric mood and sleep disturbance. The patient is not nervous/anxious. Vitals:    09/18/19 1013   BP: 94/64   Site: Right Upper Arm   Position: Sitting   Cuff Size: Large Adult   Pulse: 88   SpO2: 99%   Weight: 233 lb (105.7 kg)   Height: 5' 4.5\" (1.638 m)     Physical Exam   Constitutional: She is oriented to person, place, and time. She appears well-developed and well-nourished. Eyes: Pupils are equal, round, and reactive to light. Neck: Normal range of motion. No thyromegaly present. Cardiovascular: Normal rate, regular rhythm and normal heart sounds. Pulmonary/Chest: Effort normal and breath sounds normal.   Abdominal: She exhibits no distension. Musculoskeletal: Normal range of motion. She exhibits no edema. Neurological: She is alert and oriented to person, place, and time. No sensory deficit. Skin: Skin is warm and dry. No skin changes or evidence of trauma. Psychiatric: She has a normal mood and affect. Her behavior is normal. Thought content normal.   Vitals reviewed. Skeletal foot exam: Skeletal foot exam:  Toenails are normal. Pulses are +2 DP bilaterally. Skeletal structures are intact upon visual examination. Sensory: 10 g monofilament is 3/10 on the right and 3/10 on the left, 128 Hz vibration sense is decreased bilaterally. Assessment  Philip Garcia is a 52 y.o. female with uncontrolled Diabetes Mellitus type 2 complicated by peripheral neuropathy, slow healing wounds, blurry vision, and microalbuminuria and associated with HTN, HLD, PAHTN, PVD, CAD, CLINT, obesity.     Plan  Problem List Items Addressed This Visit     DM2/IDDM - Primary (Chronic)     Lantus; current dose is 35 units twice a day  Decrease each dose if BG is less than 100    Humalog    < 80  Do not take any humalog   Take your fixed dose insulin as prescribed:5 units  151- 200 Add  2 units  201- 250 Add 4 units  251- 300 Add 6 units   301- 350 Add 8 units  351- 400 Add 10 units      Approach for

## 2019-09-18 NOTE — ASSESSMENT & PLAN NOTE
Lantus; current dose is 35 units twice a day  Decrease each dose if BG is less than 100    Humalog    < 80  Do not take any humalog   Take your fixed dose insulin as prescribed:5 units  151- 200 Add  2 units  201- 250 Add 4 units  251- 300 Add 6 units   301- 350 Add 8 units  351- 400 Add 10 units      Approach for  your diet  1. Use Calorieking. com to look up carb counts as discussed at visit  2. [employer] for 20 grams of carbohydrates or less per meal, and plan for three meals a day  3. Snacks if consumed should be low in carb, and avoid all processed snacks as far as possible  4. Decrease rcarb consumption in beverage form: regular soda, fruit juices  5. Moderate protein and good fats are ok. 6. Increase fiber via vegetables. Limit fruit intake. 7. Eliminate use of sugar as far as possible, minimize  all artificial sweeteners. 8. Ensure good hydration: 80 -100 oz minimum  9. Ensure electrolyte replacement: powerade or gatorade ZERO or pedialyte etc.     Log book please enter carb /macro nutrients /blood sugars/ insulin dose and bring your glucometer and book at next visit    Diabetic Health Maintenance  Follow up with annual eye exams  Follow up with annual podiatry exams  Check feet daily and make sure injuries do not go unnoticed. Sick day management:   Check BG q 4 hours and correct using sliding scale only. Ensure hydration, and  electrolyte replacement:May use no sugar gatorade type drink. If blood sugars continue to rise it is important to go early to the ED for IVF. Other areas of Diabetic Education reviewed:   Carbs: good carbs and bad carbs, importance of carb counting, incorporation of protein with each meal to reduce Glycemic index, importance of portions, Carb/insulin ratio   Fats: Good fats and bad fats, meal planning and supplements.  Discussed how food affects blood sugar readings.     Insulin pumps, how they work and how they affect blood sugar levels   Steroids and effects

## 2019-09-20 ENCOUNTER — HOSPITAL ENCOUNTER (OUTPATIENT)
Dept: VASCULAR LAB | Age: 54
Discharge: HOME | End: 2019-09-20
Payer: COMMERCIAL

## 2019-09-20 DIAGNOSIS — Z95.828 H/O EXTREMITY BYPASS GRAFT: ICD-10-CM

## 2019-09-20 PROCEDURE — 93979 VASCULAR STUDY: CPT

## 2019-09-23 ENCOUNTER — TELEPHONE (OUTPATIENT)
Dept: VASCULAR SURGERY | Age: 54
End: 2019-09-23

## 2019-09-24 ENCOUNTER — TELEPHONE (OUTPATIENT)
Dept: VASCULAR SURGERY | Age: 54
End: 2019-09-24

## 2019-09-24 ENCOUNTER — NURSE ONLY (OUTPATIENT)
Dept: DERMATOLOGY | Age: 54
End: 2019-09-24

## 2019-09-24 NOTE — TELEPHONE ENCOUNTER
Called patient with date and time of angiogram/angioplasty left le for 10/1/19. Arrive at 8:30am/10:30am procedure. aimee

## 2019-09-27 RX ORDER — SPIRONOLACTONE 100 MG/1
TABLET, FILM COATED ORAL
Qty: 60 TABLET | Refills: 2 | Status: ON HOLD | OUTPATIENT
Start: 2019-09-27 | End: 2020-01-12 | Stop reason: HOSPADM

## 2019-09-27 NOTE — TELEPHONE ENCOUNTER
6/28/2019 NPDD  Plan:   1. Increase the torsemide to 100 mg twice daily (whole tablet)  2. Stay on the metolazone 5 mg daily and the spironolactone 100 mg twice daily  3. Increase the potassium 20 mEq to 8 total per day  4. Will review CardioMEMs pressures on Monday and call to adjust diuretics  5. Have blood work for me and PCP on July 8 th  6.  Keep appointment with me on July 10 th for now, may consider cancelling depending on progress

## 2019-09-28 ENCOUNTER — HOSPITAL ENCOUNTER (INPATIENT)
Age: 54
LOS: 2 days | Discharge: HOME | DRG: 194 | End: 2019-10-01
Attending: EMERGENCY MEDICINE | Admitting: INTERNAL MEDICINE
Payer: COMMERCIAL

## 2019-09-28 DIAGNOSIS — N28.9 ACUTE RENAL INSUFFICIENCY: Primary | ICD-10-CM

## 2019-09-28 DIAGNOSIS — E87.70 HYPERVOLEMIA, UNSPECIFIED HYPERVOLEMIA TYPE: ICD-10-CM

## 2019-09-28 LAB
A/G RATIO: 1.1 (ref 1.1–2.2)
ALBUMIN SERPL-MCNC: 3.7 G/DL (ref 3.4–5)
ALP BLD-CCNC: 87 U/L (ref 40–129)
ALT SERPL-CCNC: 7 U/L (ref 10–40)
ANION GAP SERPL CALCULATED.3IONS-SCNC: 17 MMOL/L (ref 3–16)
AST SERPL-CCNC: 12 U/L (ref 15–37)
BACTERIA: ABNORMAL /HPF
BASOPHILS ABSOLUTE: 0 K/UL (ref 0–0.2)
BASOPHILS RELATIVE PERCENT: 0.3 %
BILIRUB SERPL-MCNC: <0.2 MG/DL (ref 0–1)
BILIRUBIN URINE: NEGATIVE
BLOOD, URINE: NEGATIVE
BUN BLDV-MCNC: 46 MG/DL (ref 7–20)
CALCIUM SERPL-MCNC: 9.2 MG/DL (ref 8.3–10.6)
CHLORIDE BLD-SCNC: 96 MMOL/L (ref 99–110)
CLARITY: ABNORMAL
CO2: 24 MMOL/L (ref 21–32)
COLOR: YELLOW
CREAT SERPL-MCNC: 2.3 MG/DL (ref 0.6–1.1)
EOSINOPHILS ABSOLUTE: 0.1 K/UL (ref 0–0.6)
EOSINOPHILS RELATIVE PERCENT: 1.5 %
EPITHELIAL CELLS, UA: ABNORMAL /HPF
GFR AFRICAN AMERICAN: 27
GFR NON-AFRICAN AMERICAN: 22
GLOBULIN: 3.4 G/DL
GLUCOSE BLD-MCNC: 234 MG/DL (ref 70–99)
GLUCOSE URINE: NEGATIVE MG/DL
HCT VFR BLD CALC: 31.7 % (ref 36–48)
HEMOGLOBIN: 10.6 G/DL (ref 12–16)
KETONES, URINE: NEGATIVE MG/DL
LEUKOCYTE ESTERASE, URINE: ABNORMAL
LYMPHOCYTES ABSOLUTE: 1.4 K/UL (ref 1–5.1)
LYMPHOCYTES RELATIVE PERCENT: 15.5 %
MCH RBC QN AUTO: 29.8 PG (ref 26–34)
MCHC RBC AUTO-ENTMCNC: 33.4 G/DL (ref 31–36)
MCV RBC AUTO: 89.4 FL (ref 80–100)
MICROSCOPIC EXAMINATION: YES
MONOCYTES ABSOLUTE: 0.5 K/UL (ref 0–1.3)
MONOCYTES RELATIVE PERCENT: 5.6 %
NEUTROPHILS ABSOLUTE: 7.1 K/UL (ref 1.7–7.7)
NEUTROPHILS RELATIVE PERCENT: 77.1 %
NITRITE, URINE: NEGATIVE
PDW BLD-RTO: 14.7 % (ref 12.4–15.4)
PH UA: 5.5 (ref 5–8)
PLATELET # BLD: 341 K/UL (ref 135–450)
PMV BLD AUTO: 8.8 FL (ref 5–10.5)
POTASSIUM REFLEX MAGNESIUM: 4 MMOL/L (ref 3.5–5.1)
PRO-BNP: 5494 PG/ML (ref 0–124)
PROTEIN UA: NEGATIVE MG/DL
RBC # BLD: 3.55 M/UL (ref 4–5.2)
RBC UA: ABNORMAL /HPF (ref 0–2)
SODIUM BLD-SCNC: 137 MMOL/L (ref 136–145)
SPECIFIC GRAVITY UA: 1.01 (ref 1–1.03)
TOTAL PROTEIN: 7.1 G/DL (ref 6.4–8.2)
TROPONIN: 0.01 NG/ML
URINE TYPE: ABNORMAL
UROBILINOGEN, URINE: 0.2 E.U./DL
WBC # BLD: 9.2 K/UL (ref 4–11)
WBC UA: ABNORMAL /HPF (ref 0–5)

## 2019-09-28 PROCEDURE — 83880 ASSAY OF NATRIURETIC PEPTIDE: CPT

## 2019-09-28 PROCEDURE — 84484 ASSAY OF TROPONIN QUANT: CPT

## 2019-09-28 PROCEDURE — 81001 URINALYSIS AUTO W/SCOPE: CPT

## 2019-09-28 PROCEDURE — 85025 COMPLETE CBC W/AUTO DIFF WBC: CPT

## 2019-09-28 PROCEDURE — 80053 COMPREHEN METABOLIC PANEL: CPT

## 2019-09-28 PROCEDURE — 93005 ELECTROCARDIOGRAM TRACING: CPT | Performed by: EMERGENCY MEDICINE

## 2019-09-28 PROCEDURE — 36415 COLL VENOUS BLD VENIPUNCTURE: CPT

## 2019-09-28 PROCEDURE — 99285 EMERGENCY DEPT VISIT HI MDM: CPT

## 2019-09-28 RX ORDER — BUPRENORPHINE HYDROCHLORIDE AND NALOXONE HYDROCHLORIDE DIHYDRATE 8; 2 MG/1; MG/1
TABLET SUBLINGUAL
Refills: 0 | Status: ON HOLD | COMMUNITY
Start: 2019-09-17 | End: 2020-01-31 | Stop reason: CLARIF

## 2019-09-28 RX ORDER — NITROGLYCERIN 0.4 MG/1
TABLET SUBLINGUAL
Refills: 0 | Status: ON HOLD | COMMUNITY
Start: 2019-07-24 | End: 2020-01-31 | Stop reason: CLARIF

## 2019-09-28 ASSESSMENT — PAIN SCALES - GENERAL: PAINLEVEL_OUTOF10: 5

## 2019-09-28 ASSESSMENT — PAIN DESCRIPTION - LOCATION: LOCATION: MOUTH

## 2019-09-29 ENCOUNTER — APPOINTMENT (OUTPATIENT)
Dept: GENERAL RADIOLOGY | Age: 54
DRG: 194 | End: 2019-09-29
Payer: COMMERCIAL

## 2019-09-29 PROBLEM — N17.9 AKI (ACUTE KIDNEY INJURY) (HCC): Status: ACTIVE | Noted: 2019-09-29

## 2019-09-29 LAB
DIGOXIN LEVEL: <0.3 NG/ML (ref 0.8–2)
EKG ATRIAL RATE: 85 BPM
EKG DIAGNOSIS: NORMAL
EKG P AXIS: 79 DEGREES
EKG P-R INTERVAL: 180 MS
EKG Q-T INTERVAL: 404 MS
EKG QRS DURATION: 140 MS
EKG QTC CALCULATION (BAZETT): 480 MS
EKG R AXIS: -66 DEGREES
EKG T AXIS: 75 DEGREES
EKG VENTRICULAR RATE: 85 BPM
GLUCOSE BLD-MCNC: 120 MG/DL (ref 70–99)
GLUCOSE BLD-MCNC: 207 MG/DL (ref 70–99)
GLUCOSE BLD-MCNC: 212 MG/DL (ref 70–99)
GLUCOSE BLD-MCNC: 229 MG/DL (ref 70–99)
PERFORMED ON: ABNORMAL

## 2019-09-29 PROCEDURE — 6370000000 HC RX 637 (ALT 250 FOR IP): Performed by: EMERGENCY MEDICINE

## 2019-09-29 PROCEDURE — 6370000000 HC RX 637 (ALT 250 FOR IP): Performed by: INTERNAL MEDICINE

## 2019-09-29 PROCEDURE — 6370000000 HC RX 637 (ALT 250 FOR IP): Performed by: HOSPITALIST

## 2019-09-29 PROCEDURE — 93010 ELECTROCARDIOGRAM REPORT: CPT | Performed by: INTERNAL MEDICINE

## 2019-09-29 PROCEDURE — 6360000002 HC RX W HCPCS: Performed by: HOSPITALIST

## 2019-09-29 PROCEDURE — 71045 X-RAY EXAM CHEST 1 VIEW: CPT

## 2019-09-29 PROCEDURE — 2580000003 HC RX 258: Performed by: HOSPITALIST

## 2019-09-29 PROCEDURE — 36415 COLL VENOUS BLD VENIPUNCTURE: CPT

## 2019-09-29 PROCEDURE — 2060000000 HC ICU INTERMEDIATE R&B

## 2019-09-29 PROCEDURE — 80162 ASSAY OF DIGOXIN TOTAL: CPT

## 2019-09-29 RX ORDER — BUPRENORPHINE HYDROCHLORIDE AND NALOXONE HYDROCHLORIDE DIHYDRATE 8; 2 MG/1; MG/1
1 TABLET SUBLINGUAL 2 TIMES DAILY
Status: DISCONTINUED | OUTPATIENT
Start: 2019-09-29 | End: 2019-10-01 | Stop reason: HOSPADM

## 2019-09-29 RX ORDER — HEPARIN SODIUM 5000 [USP'U]/ML
5000 INJECTION, SOLUTION INTRAVENOUS; SUBCUTANEOUS EVERY 8 HOURS SCHEDULED
Status: DISCONTINUED | OUTPATIENT
Start: 2019-09-29 | End: 2019-10-01 | Stop reason: HOSPADM

## 2019-09-29 RX ORDER — ATORVASTATIN CALCIUM 40 MG/1
80 TABLET, FILM COATED ORAL NIGHTLY
Status: DISCONTINUED | OUTPATIENT
Start: 2019-09-29 | End: 2019-10-01 | Stop reason: HOSPADM

## 2019-09-29 RX ORDER — CLOPIDOGREL BISULFATE 75 MG/1
75 TABLET ORAL DAILY
Status: DISCONTINUED | OUTPATIENT
Start: 2019-09-29 | End: 2019-10-01 | Stop reason: HOSPADM

## 2019-09-29 RX ORDER — ASPIRIN 81 MG/1
81 TABLET ORAL DAILY
Status: DISCONTINUED | OUTPATIENT
Start: 2019-09-29 | End: 2019-10-01 | Stop reason: HOSPADM

## 2019-09-29 RX ORDER — SODIUM CHLORIDE 0.9 % (FLUSH) 0.9 %
10 SYRINGE (ML) INJECTION PRN
Status: DISCONTINUED | OUTPATIENT
Start: 2019-09-29 | End: 2019-10-01 | Stop reason: HOSPADM

## 2019-09-29 RX ORDER — ALBUTEROL SULFATE 90 UG/1
2 AEROSOL, METERED RESPIRATORY (INHALATION) EVERY 6 HOURS PRN
Status: DISCONTINUED | OUTPATIENT
Start: 2019-09-29 | End: 2019-09-29

## 2019-09-29 RX ORDER — TRAMADOL HYDROCHLORIDE 50 MG/1
25 TABLET ORAL EVERY 6 HOURS PRN
Status: DISCONTINUED | OUTPATIENT
Start: 2019-09-29 | End: 2019-09-30

## 2019-09-29 RX ORDER — VARENICLINE TARTRATE 0.5 MG/1
0.5 TABLET, FILM COATED ORAL DAILY
Status: DISCONTINUED | OUTPATIENT
Start: 2019-09-29 | End: 2019-10-01 | Stop reason: HOSPADM

## 2019-09-29 RX ORDER — ARIPIPRAZOLE 10 MG/1
5 TABLET ORAL DAILY
Status: DISCONTINUED | OUTPATIENT
Start: 2019-09-29 | End: 2019-10-01 | Stop reason: HOSPADM

## 2019-09-29 RX ORDER — FUROSEMIDE 10 MG/ML
20 INJECTION INTRAMUSCULAR; INTRAVENOUS 2 TIMES DAILY
Status: DISCONTINUED | OUTPATIENT
Start: 2019-09-29 | End: 2019-10-01 | Stop reason: HOSPADM

## 2019-09-29 RX ORDER — ALBUTEROL SULFATE 90 UG/1
2 AEROSOL, METERED RESPIRATORY (INHALATION) EVERY 6 HOURS PRN
Status: DISCONTINUED | OUTPATIENT
Start: 2019-09-29 | End: 2019-10-01 | Stop reason: HOSPADM

## 2019-09-29 RX ORDER — SODIUM CHLORIDE 0.9 % (FLUSH) 0.9 %
10 SYRINGE (ML) INJECTION EVERY 12 HOURS SCHEDULED
Status: DISCONTINUED | OUTPATIENT
Start: 2019-09-29 | End: 2019-10-01 | Stop reason: HOSPADM

## 2019-09-29 RX ORDER — ACETAMINOPHEN 325 MG/1
650 TABLET ORAL EVERY 4 HOURS PRN
Status: DISCONTINUED | OUTPATIENT
Start: 2019-09-29 | End: 2019-10-01 | Stop reason: HOSPADM

## 2019-09-29 RX ORDER — CARVEDILOL 3.12 MG/1
3.12 TABLET ORAL 2 TIMES DAILY WITH MEALS
Status: DISCONTINUED | OUTPATIENT
Start: 2019-09-29 | End: 2019-10-01 | Stop reason: HOSPADM

## 2019-09-29 RX ORDER — PANTOPRAZOLE SODIUM 40 MG/1
40 TABLET, DELAYED RELEASE ORAL DAILY
Status: DISCONTINUED | OUTPATIENT
Start: 2019-09-29 | End: 2019-10-01 | Stop reason: HOSPADM

## 2019-09-29 RX ORDER — NITROGLYCERIN 0.4 MG/1
0.4 TABLET SUBLINGUAL EVERY 5 MIN PRN
Status: DISCONTINUED | OUTPATIENT
Start: 2019-09-29 | End: 2019-10-01 | Stop reason: HOSPADM

## 2019-09-29 RX ORDER — INSULIN GLARGINE 100 [IU]/ML
35 INJECTION, SOLUTION SUBCUTANEOUS 2 TIMES DAILY
Status: DISCONTINUED | OUTPATIENT
Start: 2019-09-29 | End: 2019-10-01 | Stop reason: HOSPADM

## 2019-09-29 RX ORDER — ONDANSETRON 2 MG/ML
4 INJECTION INTRAMUSCULAR; INTRAVENOUS EVERY 6 HOURS PRN
Status: DISCONTINUED | OUTPATIENT
Start: 2019-09-29 | End: 2019-10-01 | Stop reason: HOSPADM

## 2019-09-29 RX ORDER — ACETAMINOPHEN 500 MG
1000 TABLET ORAL ONCE
Status: COMPLETED | OUTPATIENT
Start: 2019-09-29 | End: 2019-09-29

## 2019-09-29 RX ORDER — BUSPIRONE HYDROCHLORIDE 10 MG/1
30 TABLET ORAL 3 TIMES DAILY
Status: DISCONTINUED | OUTPATIENT
Start: 2019-09-29 | End: 2019-10-01 | Stop reason: HOSPADM

## 2019-09-29 RX ADMIN — INSULIN LISPRO 1 UNITS: 100 INJECTION, SOLUTION INTRAVENOUS; SUBCUTANEOUS at 20:53

## 2019-09-29 RX ADMIN — VARENICLINE TARTRATE 0.5 MG: 0.5 TABLET, FILM COATED ORAL at 09:20

## 2019-09-29 RX ADMIN — Medication 10 ML: at 09:18

## 2019-09-29 RX ADMIN — LAMOTRIGINE 150 MG: 100 TABLET ORAL at 20:41

## 2019-09-29 RX ADMIN — ACETAMINOPHEN 650 MG: 325 TABLET ORAL at 14:24

## 2019-09-29 RX ADMIN — BUPRENORPHINE HYDROCHLORIDE AND NALOXONE HYDROCHLORIDE DIHYDRATE 1 TABLET: 8; 2 TABLET SUBLINGUAL at 20:41

## 2019-09-29 RX ADMIN — ACETAMINOPHEN 1000 MG: 500 TABLET ORAL at 02:25

## 2019-09-29 RX ADMIN — BUSPIRONE HYDROCHLORIDE 30 MG: 10 TABLET ORAL at 17:24

## 2019-09-29 RX ADMIN — ACETAMINOPHEN 650 MG: 325 TABLET ORAL at 18:29

## 2019-09-29 RX ADMIN — LAMOTRIGINE 150 MG: 100 TABLET ORAL at 09:19

## 2019-09-29 RX ADMIN — CARVEDILOL 3.12 MG: 3.12 TABLET, FILM COATED ORAL at 17:23

## 2019-09-29 RX ADMIN — BUPRENORPHINE HYDROCHLORIDE AND NALOXONE HYDROCHLORIDE DIHYDRATE 1 TABLET: 8; 2 TABLET SUBLINGUAL at 09:19

## 2019-09-29 RX ADMIN — Medication: at 17:18

## 2019-09-29 RX ADMIN — ARIPIPRAZOLE 5 MG: 10 TABLET ORAL at 09:19

## 2019-09-29 RX ADMIN — TIOTROPIUM BROMIDE 18 MCG: 18 CAPSULE ORAL; RESPIRATORY (INHALATION) at 08:18

## 2019-09-29 RX ADMIN — Medication 10 ML: at 20:41

## 2019-09-29 RX ADMIN — FUROSEMIDE 20 MG: 10 INJECTION, SOLUTION INTRAMUSCULAR; INTRAVENOUS at 17:23

## 2019-09-29 RX ADMIN — INSULIN LISPRO 2 UNITS: 100 INJECTION, SOLUTION INTRAVENOUS; SUBCUTANEOUS at 12:47

## 2019-09-29 RX ADMIN — PANTOPRAZOLE SODIUM 40 MG: 40 TABLET, DELAYED RELEASE ORAL at 09:19

## 2019-09-29 RX ADMIN — HEPARIN SODIUM 5000 UNITS: 5000 INJECTION INTRAVENOUS; SUBCUTANEOUS at 17:23

## 2019-09-29 RX ADMIN — BUSPIRONE HYDROCHLORIDE 30 MG: 10 TABLET ORAL at 20:40

## 2019-09-29 RX ADMIN — ASPIRIN 81 MG: 81 TABLET, COATED ORAL at 09:18

## 2019-09-29 RX ADMIN — BUSPIRONE HYDROCHLORIDE 30 MG: 10 TABLET ORAL at 09:18

## 2019-09-29 RX ADMIN — CARVEDILOL 3.12 MG: 3.12 TABLET, FILM COATED ORAL at 09:19

## 2019-09-29 RX ADMIN — FUROSEMIDE 20 MG: 10 INJECTION, SOLUTION INTRAMUSCULAR; INTRAVENOUS at 09:20

## 2019-09-29 RX ADMIN — HEPARIN SODIUM 5000 UNITS: 5000 INJECTION INTRAVENOUS; SUBCUTANEOUS at 20:53

## 2019-09-29 RX ADMIN — INSULIN GLARGINE 34 UNITS: 100 INJECTION, SOLUTION SUBCUTANEOUS at 09:23

## 2019-09-29 RX ADMIN — ACETAMINOPHEN 650 MG: 325 TABLET ORAL at 09:19

## 2019-09-29 RX ADMIN — ATORVASTATIN CALCIUM 80 MG: 40 TABLET, FILM COATED ORAL at 20:41

## 2019-09-29 RX ADMIN — INSULIN LISPRO 2 UNITS: 100 INJECTION, SOLUTION INTRAVENOUS; SUBCUTANEOUS at 17:18

## 2019-09-29 RX ADMIN — TRAMADOL HYDROCHLORIDE 25 MG: 50 TABLET, FILM COATED ORAL at 20:40

## 2019-09-29 RX ADMIN — INSULIN GLARGINE 35 UNITS: 100 INJECTION, SOLUTION SUBCUTANEOUS at 20:53

## 2019-09-29 RX ADMIN — CLOPIDOGREL BISULFATE 75 MG: 75 TABLET, FILM COATED ORAL at 09:19

## 2019-09-29 ASSESSMENT — PAIN SCALES - GENERAL
PAINLEVEL_OUTOF10: 7
PAINLEVEL_OUTOF10: 3
PAINLEVEL_OUTOF10: 7
PAINLEVEL_OUTOF10: 3
PAINLEVEL_OUTOF10: 5
PAINLEVEL_OUTOF10: 0
PAINLEVEL_OUTOF10: 7
PAINLEVEL_OUTOF10: 8
PAINLEVEL_OUTOF10: 5
PAINLEVEL_OUTOF10: 7

## 2019-09-29 ASSESSMENT — PAIN DESCRIPTION - PROGRESSION
CLINICAL_PROGRESSION: GRADUALLY WORSENING
CLINICAL_PROGRESSION: GRADUALLY WORSENING

## 2019-09-29 ASSESSMENT — PAIN DESCRIPTION - DESCRIPTORS
DESCRIPTORS: ACHING;CONSTANT;HEADACHE
DESCRIPTORS: ACHING;HEADACHE

## 2019-09-29 ASSESSMENT — PAIN DESCRIPTION - ONSET
ONSET: AWAKENED FROM SLEEP
ONSET: ON-GOING

## 2019-09-29 ASSESSMENT — PAIN DESCRIPTION - ORIENTATION
ORIENTATION: OTHER (COMMENT)
ORIENTATION: RIGHT
ORIENTATION: RIGHT;OTHER (COMMENT)

## 2019-09-29 ASSESSMENT — PAIN DESCRIPTION - LOCATION
LOCATION: HEAD;JAW
LOCATION: HEAD
LOCATION: FACE;HEAD
LOCATION: HEAD

## 2019-09-29 ASSESSMENT — PAIN DESCRIPTION - PAIN TYPE
TYPE: ACUTE PAIN

## 2019-09-29 ASSESSMENT — PAIN DESCRIPTION - FREQUENCY
FREQUENCY: CONTINUOUS
FREQUENCY: CONTINUOUS

## 2019-09-29 ASSESSMENT — PAIN - FUNCTIONAL ASSESSMENT
PAIN_FUNCTIONAL_ASSESSMENT: PREVENTS OR INTERFERES SOME ACTIVE ACTIVITIES AND ADLS
PAIN_FUNCTIONAL_ASSESSMENT: ACTIVITIES ARE NOT PREVENTED

## 2019-09-29 ASSESSMENT — PAIN DESCRIPTION - DIRECTION: RADIATING_TOWARDS: JAW

## 2019-09-30 ENCOUNTER — TELEPHONE (OUTPATIENT)
Dept: CARDIOLOGY CLINIC | Age: 54
End: 2019-09-30

## 2019-09-30 LAB
ALBUMIN SERPL-MCNC: 3.7 G/DL (ref 3.4–5)
ANION GAP SERPL CALCULATED.3IONS-SCNC: 13 MMOL/L (ref 3–16)
BASOPHILS ABSOLUTE: 0 K/UL (ref 0–0.2)
BASOPHILS RELATIVE PERCENT: 0.4 %
BUN BLDV-MCNC: 43 MG/DL (ref 7–20)
CALCIUM SERPL-MCNC: 9.5 MG/DL (ref 8.3–10.6)
CHLORIDE BLD-SCNC: 91 MMOL/L (ref 99–110)
CO2: 28 MMOL/L (ref 21–32)
CREAT SERPL-MCNC: 1.6 MG/DL (ref 0.6–1.1)
EOSINOPHILS ABSOLUTE: 0.2 K/UL (ref 0–0.6)
EOSINOPHILS RELATIVE PERCENT: 1.8 %
GFR AFRICAN AMERICAN: 40
GFR NON-AFRICAN AMERICAN: 33
GLUCOSE BLD-MCNC: 177 MG/DL (ref 70–99)
GLUCOSE BLD-MCNC: 180 MG/DL (ref 70–99)
GLUCOSE BLD-MCNC: 189 MG/DL (ref 70–99)
GLUCOSE BLD-MCNC: 191 MG/DL (ref 70–99)
GLUCOSE BLD-MCNC: 244 MG/DL (ref 70–99)
GLUCOSE BLD-MCNC: 246 MG/DL (ref 70–99)
HCT VFR BLD CALC: 32.3 % (ref 36–48)
HEMOGLOBIN: 10.8 G/DL (ref 12–16)
LACTIC ACID: 1.1 MMOL/L (ref 0.4–2)
LYMPHOCYTES ABSOLUTE: 1.1 K/UL (ref 1–5.1)
LYMPHOCYTES RELATIVE PERCENT: 12.4 %
MAGNESIUM: 1.6 MG/DL (ref 1.8–2.4)
MCH RBC QN AUTO: 29.8 PG (ref 26–34)
MCHC RBC AUTO-ENTMCNC: 33.3 G/DL (ref 31–36)
MCV RBC AUTO: 89.3 FL (ref 80–100)
MONOCYTES ABSOLUTE: 0.4 K/UL (ref 0–1.3)
MONOCYTES RELATIVE PERCENT: 4.7 %
NEUTROPHILS ABSOLUTE: 6.9 K/UL (ref 1.7–7.7)
NEUTROPHILS RELATIVE PERCENT: 80.7 %
PDW BLD-RTO: 14.6 % (ref 12.4–15.4)
PERFORMED ON: ABNORMAL
PHOSPHORUS: 3.8 MG/DL (ref 2.5–4.9)
PLATELET # BLD: 323 K/UL (ref 135–450)
PMV BLD AUTO: 9 FL (ref 5–10.5)
POTASSIUM REFLEX MAGNESIUM: 3.4 MMOL/L (ref 3.5–5.1)
POTASSIUM SERPL-SCNC: 3.4 MMOL/L (ref 3.5–5.1)
RBC # BLD: 3.62 M/UL (ref 4–5.2)
SODIUM BLD-SCNC: 132 MMOL/L (ref 136–145)
WBC # BLD: 8.5 K/UL (ref 4–11)

## 2019-09-30 PROCEDURE — 83735 ASSAY OF MAGNESIUM: CPT

## 2019-09-30 PROCEDURE — 6370000000 HC RX 637 (ALT 250 FOR IP): Performed by: INTERNAL MEDICINE

## 2019-09-30 PROCEDURE — 6370000000 HC RX 637 (ALT 250 FOR IP): Performed by: HOSPITALIST

## 2019-09-30 PROCEDURE — 94640 AIRWAY INHALATION TREATMENT: CPT

## 2019-09-30 PROCEDURE — 6360000002 HC RX W HCPCS: Performed by: INTERNAL MEDICINE

## 2019-09-30 PROCEDURE — 36415 COLL VENOUS BLD VENIPUNCTURE: CPT

## 2019-09-30 PROCEDURE — 85025 COMPLETE CBC W/AUTO DIFF WBC: CPT

## 2019-09-30 PROCEDURE — 6360000002 HC RX W HCPCS: Performed by: HOSPITALIST

## 2019-09-30 PROCEDURE — 99232 SBSQ HOSP IP/OBS MODERATE 35: CPT | Performed by: INTERNAL MEDICINE

## 2019-09-30 PROCEDURE — 2580000003 HC RX 258: Performed by: HOSPITALIST

## 2019-09-30 PROCEDURE — 2060000000 HC ICU INTERMEDIATE R&B

## 2019-09-30 PROCEDURE — 83605 ASSAY OF LACTIC ACID: CPT

## 2019-09-30 PROCEDURE — 80069 RENAL FUNCTION PANEL: CPT

## 2019-09-30 RX ORDER — MAGNESIUM SULFATE IN WATER 40 MG/ML
2 INJECTION, SOLUTION INTRAVENOUS ONCE
Status: COMPLETED | OUTPATIENT
Start: 2019-09-30 | End: 2019-09-30

## 2019-09-30 RX ORDER — TRAMADOL HYDROCHLORIDE 50 MG/1
50 TABLET ORAL EVERY 6 HOURS PRN
Status: DISCONTINUED | OUTPATIENT
Start: 2019-09-30 | End: 2019-10-01 | Stop reason: HOSPADM

## 2019-09-30 RX ORDER — POTASSIUM CHLORIDE 20 MEQ/1
40 TABLET, EXTENDED RELEASE ORAL ONCE
Status: COMPLETED | OUTPATIENT
Start: 2019-09-30 | End: 2019-09-30

## 2019-09-30 RX ADMIN — FUROSEMIDE 20 MG: 10 INJECTION, SOLUTION INTRAMUSCULAR; INTRAVENOUS at 17:33

## 2019-09-30 RX ADMIN — TRAMADOL HYDROCHLORIDE 25 MG: 50 TABLET, FILM COATED ORAL at 03:16

## 2019-09-30 RX ADMIN — BUSPIRONE HYDROCHLORIDE 30 MG: 10 TABLET ORAL at 20:19

## 2019-09-30 RX ADMIN — MAGNESIUM SULFATE HEPTAHYDRATE 2 G: 40 INJECTION, SOLUTION INTRAVENOUS at 09:52

## 2019-09-30 RX ADMIN — INSULIN GLARGINE 35 UNITS: 100 INJECTION, SOLUTION SUBCUTANEOUS at 10:06

## 2019-09-30 RX ADMIN — TRAMADOL HYDROCHLORIDE 50 MG: 50 TABLET, FILM COATED ORAL at 22:44

## 2019-09-30 RX ADMIN — TRAMADOL HYDROCHLORIDE 50 MG: 50 TABLET, FILM COATED ORAL at 16:14

## 2019-09-30 RX ADMIN — HEPARIN SODIUM 5000 UNITS: 5000 INJECTION INTRAVENOUS; SUBCUTANEOUS at 07:39

## 2019-09-30 RX ADMIN — INSULIN LISPRO 1 UNITS: 100 INJECTION, SOLUTION INTRAVENOUS; SUBCUTANEOUS at 20:17

## 2019-09-30 RX ADMIN — VARENICLINE TARTRATE 0.5 MG: 0.5 TABLET, FILM COATED ORAL at 09:59

## 2019-09-30 RX ADMIN — Medication 10 ML: at 20:19

## 2019-09-30 RX ADMIN — ASPIRIN 81 MG: 81 TABLET, COATED ORAL at 09:51

## 2019-09-30 RX ADMIN — HEPARIN SODIUM 5000 UNITS: 5000 INJECTION INTRAVENOUS; SUBCUTANEOUS at 22:44

## 2019-09-30 RX ADMIN — POTASSIUM CHLORIDE 40 MEQ: 20 TABLET, EXTENDED RELEASE ORAL at 09:50

## 2019-09-30 RX ADMIN — INSULIN LISPRO 1 UNITS: 100 INJECTION, SOLUTION INTRAVENOUS; SUBCUTANEOUS at 16:19

## 2019-09-30 RX ADMIN — INSULIN LISPRO 2 UNITS: 100 INJECTION, SOLUTION INTRAVENOUS; SUBCUTANEOUS at 11:47

## 2019-09-30 RX ADMIN — CARVEDILOL 3.12 MG: 3.12 TABLET, FILM COATED ORAL at 16:14

## 2019-09-30 RX ADMIN — PANTOPRAZOLE SODIUM 40 MG: 40 TABLET, DELAYED RELEASE ORAL at 09:51

## 2019-09-30 RX ADMIN — TRAMADOL HYDROCHLORIDE 25 MG: 50 TABLET, FILM COATED ORAL at 09:51

## 2019-09-30 RX ADMIN — LAMOTRIGINE 150 MG: 100 TABLET ORAL at 10:14

## 2019-09-30 RX ADMIN — BUSPIRONE HYDROCHLORIDE 30 MG: 10 TABLET ORAL at 09:51

## 2019-09-30 RX ADMIN — FUROSEMIDE 20 MG: 10 INJECTION, SOLUTION INTRAMUSCULAR; INTRAVENOUS at 09:51

## 2019-09-30 RX ADMIN — Medication 10 ML: at 09:51

## 2019-09-30 RX ADMIN — TIOTROPIUM BROMIDE 18 MCG: 18 CAPSULE ORAL; RESPIRATORY (INHALATION) at 08:17

## 2019-09-30 RX ADMIN — BUPRENORPHINE HYDROCHLORIDE AND NALOXONE HYDROCHLORIDE DIHYDRATE 1 TABLET: 8; 2 TABLET SUBLINGUAL at 20:19

## 2019-09-30 RX ADMIN — BUPRENORPHINE HYDROCHLORIDE AND NALOXONE HYDROCHLORIDE DIHYDRATE 1 TABLET: 8; 2 TABLET SUBLINGUAL at 09:50

## 2019-09-30 RX ADMIN — CLOPIDOGREL BISULFATE 75 MG: 75 TABLET, FILM COATED ORAL at 09:51

## 2019-09-30 RX ADMIN — INSULIN GLARGINE 35 UNITS: 100 INJECTION, SOLUTION SUBCUTANEOUS at 20:17

## 2019-09-30 RX ADMIN — HEPARIN SODIUM 5000 UNITS: 5000 INJECTION INTRAVENOUS; SUBCUTANEOUS at 14:13

## 2019-09-30 RX ADMIN — ATORVASTATIN CALCIUM 80 MG: 40 TABLET, FILM COATED ORAL at 20:19

## 2019-09-30 RX ADMIN — LAMOTRIGINE 150 MG: 100 TABLET ORAL at 20:19

## 2019-09-30 RX ADMIN — ARIPIPRAZOLE 5 MG: 10 TABLET ORAL at 09:50

## 2019-09-30 RX ADMIN — CARVEDILOL 3.12 MG: 3.12 TABLET, FILM COATED ORAL at 09:50

## 2019-09-30 RX ADMIN — BUSPIRONE HYDROCHLORIDE 30 MG: 10 TABLET ORAL at 14:13

## 2019-09-30 RX ADMIN — POTASSIUM CHLORIDE 40 MEQ: 20 TABLET, EXTENDED RELEASE ORAL at 14:13

## 2019-09-30 ASSESSMENT — PAIN - FUNCTIONAL ASSESSMENT
PAIN_FUNCTIONAL_ASSESSMENT: ACTIVITIES ARE NOT PREVENTED
PAIN_FUNCTIONAL_ASSESSMENT: ACTIVITIES ARE NOT PREVENTED
PAIN_FUNCTIONAL_ASSESSMENT: PREVENTS OR INTERFERES SOME ACTIVE ACTIVITIES AND ADLS
PAIN_FUNCTIONAL_ASSESSMENT: ACTIVITIES ARE NOT PREVENTED

## 2019-09-30 ASSESSMENT — PAIN DESCRIPTION - DIRECTION: RADIATING_TOWARDS: JAW

## 2019-09-30 ASSESSMENT — PAIN DESCRIPTION - PROGRESSION
CLINICAL_PROGRESSION: GRADUALLY WORSENING

## 2019-09-30 ASSESSMENT — PAIN DESCRIPTION - DESCRIPTORS
DESCRIPTORS: SHARP;THROBBING
DESCRIPTORS: ACHING;HEADACHE
DESCRIPTORS: THROBBING;ACHING
DESCRIPTORS: THROBBING;ACHING

## 2019-09-30 ASSESSMENT — PAIN SCALES - GENERAL
PAINLEVEL_OUTOF10: 6
PAINLEVEL_OUTOF10: 7
PAINLEVEL_OUTOF10: 0
PAINLEVEL_OUTOF10: 0
PAINLEVEL_OUTOF10: 7
PAINLEVEL_OUTOF10: 6
PAINLEVEL_OUTOF10: 7

## 2019-09-30 ASSESSMENT — PAIN DESCRIPTION - ONSET
ONSET: ON-GOING
ONSET: GRADUAL
ONSET: GRADUAL
ONSET: SUDDEN

## 2019-09-30 ASSESSMENT — PAIN DESCRIPTION - ORIENTATION
ORIENTATION: RIGHT

## 2019-09-30 ASSESSMENT — PAIN DESCRIPTION - PAIN TYPE
TYPE: ACUTE PAIN
TYPE: CHRONIC PAIN
TYPE: ACUTE PAIN
TYPE: ACUTE PAIN

## 2019-09-30 ASSESSMENT — PAIN DESCRIPTION - FREQUENCY
FREQUENCY: INTERMITTENT

## 2019-09-30 ASSESSMENT — PAIN DESCRIPTION - LOCATION
LOCATION: BACK;HEAD;FACE
LOCATION: JAW
LOCATION: HEAD;JAW

## 2019-10-01 VITALS
WEIGHT: 230.4 LBS | HEART RATE: 88 BPM | TEMPERATURE: 96.8 F | OXYGEN SATURATION: 92 % | RESPIRATION RATE: 16 BRPM | BODY MASS INDEX: 39.34 KG/M2 | DIASTOLIC BLOOD PRESSURE: 74 MMHG | SYSTOLIC BLOOD PRESSURE: 107 MMHG | HEIGHT: 64 IN

## 2019-10-01 LAB
ANION GAP SERPL CALCULATED.3IONS-SCNC: 12 MMOL/L (ref 3–16)
BASOPHILS ABSOLUTE: 0 K/UL (ref 0–0.2)
BASOPHILS RELATIVE PERCENT: 0.5 %
BUN BLDV-MCNC: 39 MG/DL (ref 7–20)
CALCIUM SERPL-MCNC: 9.8 MG/DL (ref 8.3–10.6)
CHLORIDE BLD-SCNC: 94 MMOL/L (ref 99–110)
CO2: 30 MMOL/L (ref 21–32)
CREAT SERPL-MCNC: 1.5 MG/DL (ref 0.6–1.1)
EOSINOPHILS ABSOLUTE: 0.2 K/UL (ref 0–0.6)
EOSINOPHILS RELATIVE PERCENT: 2 %
GFR AFRICAN AMERICAN: 43
GFR NON-AFRICAN AMERICAN: 36
GLUCOSE BLD-MCNC: 152 MG/DL (ref 70–99)
GLUCOSE BLD-MCNC: 163 MG/DL (ref 70–99)
GLUCOSE BLD-MCNC: 172 MG/DL (ref 70–99)
GLUCOSE BLD-MCNC: 176 MG/DL (ref 70–99)
HCT VFR BLD CALC: 31.4 % (ref 36–48)
HEMOGLOBIN: 10.6 G/DL (ref 12–16)
LYMPHOCYTES ABSOLUTE: 1.5 K/UL (ref 1–5.1)
LYMPHOCYTES RELATIVE PERCENT: 18.3 %
MCH RBC QN AUTO: 29.9 PG (ref 26–34)
MCHC RBC AUTO-ENTMCNC: 33.9 G/DL (ref 31–36)
MCV RBC AUTO: 88.4 FL (ref 80–100)
MONOCYTES ABSOLUTE: 0.5 K/UL (ref 0–1.3)
MONOCYTES RELATIVE PERCENT: 6.5 %
NEUTROPHILS ABSOLUTE: 5.9 K/UL (ref 1.7–7.7)
NEUTROPHILS RELATIVE PERCENT: 72.7 %
PDW BLD-RTO: 14.5 % (ref 12.4–15.4)
PERFORMED ON: ABNORMAL
PLATELET # BLD: 339 K/UL (ref 135–450)
PMV BLD AUTO: 8.5 FL (ref 5–10.5)
POTASSIUM REFLEX MAGNESIUM: 3.9 MMOL/L (ref 3.5–5.1)
RBC # BLD: 3.55 M/UL (ref 4–5.2)
SODIUM BLD-SCNC: 136 MMOL/L (ref 136–145)
WBC # BLD: 8.1 K/UL (ref 4–11)

## 2019-10-01 PROCEDURE — 6360000002 HC RX W HCPCS: Performed by: HOSPITALIST

## 2019-10-01 PROCEDURE — 6360000002 HC RX W HCPCS: Performed by: INTERNAL MEDICINE

## 2019-10-01 PROCEDURE — 80048 BASIC METABOLIC PNL TOTAL CA: CPT

## 2019-10-01 PROCEDURE — 6370000000 HC RX 637 (ALT 250 FOR IP): Performed by: HOSPITALIST

## 2019-10-01 PROCEDURE — 99238 HOSP IP/OBS DSCHRG MGMT 30/<: CPT | Performed by: INTERNAL MEDICINE

## 2019-10-01 PROCEDURE — 6370000000 HC RX 637 (ALT 250 FOR IP): Performed by: INTERNAL MEDICINE

## 2019-10-01 PROCEDURE — 90686 IIV4 VACC NO PRSV 0.5 ML IM: CPT | Performed by: INTERNAL MEDICINE

## 2019-10-01 PROCEDURE — G0008 ADMIN INFLUENZA VIRUS VAC: HCPCS | Performed by: INTERNAL MEDICINE

## 2019-10-01 PROCEDURE — 94640 AIRWAY INHALATION TREATMENT: CPT

## 2019-10-01 PROCEDURE — 85025 COMPLETE CBC W/AUTO DIFF WBC: CPT

## 2019-10-01 PROCEDURE — 51798 US URINE CAPACITY MEASURE: CPT

## 2019-10-01 PROCEDURE — 36415 COLL VENOUS BLD VENIPUNCTURE: CPT

## 2019-10-01 PROCEDURE — 2580000003 HC RX 258: Performed by: HOSPITALIST

## 2019-10-01 RX ORDER — PANTOPRAZOLE SODIUM 40 MG/1
40 TABLET, DELAYED RELEASE ORAL 2 TIMES DAILY
Qty: 60 TABLET | Refills: 0 | Status: ON HOLD
Start: 2019-10-01 | End: 2021-01-01 | Stop reason: SDUPTHER

## 2019-10-01 RX ORDER — TORSEMIDE 100 MG/1
100 TABLET ORAL DAILY
Qty: 30 TABLET | Refills: 0 | Status: ON HOLD
Start: 2019-10-01 | End: 2020-01-12 | Stop reason: HOSPADM

## 2019-10-01 RX ORDER — METOLAZONE 5 MG/1
TABLET ORAL
Qty: 90 TABLET | Refills: 3 | Status: ON HOLD | OUTPATIENT
Start: 2019-10-01 | End: 2020-01-12 | Stop reason: HOSPADM

## 2019-10-01 RX ADMIN — Medication 10 ML: at 08:11

## 2019-10-01 RX ADMIN — TRAMADOL HYDROCHLORIDE 50 MG: 50 TABLET, FILM COATED ORAL at 12:28

## 2019-10-01 RX ADMIN — TRAMADOL HYDROCHLORIDE 50 MG: 50 TABLET, FILM COATED ORAL at 05:56

## 2019-10-01 RX ADMIN — INSULIN LISPRO 1 UNITS: 100 INJECTION, SOLUTION INTRAVENOUS; SUBCUTANEOUS at 12:23

## 2019-10-01 RX ADMIN — ASPIRIN 81 MG: 81 TABLET, COATED ORAL at 08:11

## 2019-10-01 RX ADMIN — CARVEDILOL 3.12 MG: 3.12 TABLET, FILM COATED ORAL at 08:11

## 2019-10-01 RX ADMIN — INSULIN GLARGINE 35 UNITS: 100 INJECTION, SOLUTION SUBCUTANEOUS at 08:16

## 2019-10-01 RX ADMIN — INFLUENZA A VIRUS A/BRISBANE/02/2018 IVR-190 (H1N1) ANTIGEN (PROPIOLACTONE INACTIVATED), INFLUENZA A VIRUS A/KANSAS/14/2017 X-327 (H3N2) ANTIGEN (PROPIOLACTONE INACTIVATED), INFLUENZA B VIRUS B/MARYLAND/15/2016 ANTIGEN (PROPIOLACTONE INACTIVATED), INFLUENZA B VIRUS B/PHUKET/3073/2013 BVR-1B ANTIGEN (PROPIOLACTONE INACTIVATED) 0.5 ML: 15; 15; 15; 15 INJECTION, SUSPENSION INTRAMUSCULAR at 08:12

## 2019-10-01 RX ADMIN — HEPARIN SODIUM 5000 UNITS: 5000 INJECTION INTRAVENOUS; SUBCUTANEOUS at 05:56

## 2019-10-01 RX ADMIN — FUROSEMIDE 20 MG: 10 INJECTION, SOLUTION INTRAMUSCULAR; INTRAVENOUS at 08:12

## 2019-10-01 RX ADMIN — LAMOTRIGINE 150 MG: 100 TABLET ORAL at 08:10

## 2019-10-01 RX ADMIN — VARENICLINE TARTRATE 0.5 MG: 0.5 TABLET, FILM COATED ORAL at 08:10

## 2019-10-01 RX ADMIN — PANTOPRAZOLE SODIUM 40 MG: 40 TABLET, DELAYED RELEASE ORAL at 08:11

## 2019-10-01 RX ADMIN — FUROSEMIDE 20 MG: 10 INJECTION, SOLUTION INTRAMUSCULAR; INTRAVENOUS at 12:21

## 2019-10-01 RX ADMIN — CLOPIDOGREL BISULFATE 75 MG: 75 TABLET, FILM COATED ORAL at 08:11

## 2019-10-01 RX ADMIN — BUSPIRONE HYDROCHLORIDE 30 MG: 10 TABLET ORAL at 08:10

## 2019-10-01 RX ADMIN — BUPRENORPHINE HYDROCHLORIDE AND NALOXONE HYDROCHLORIDE DIHYDRATE 1 TABLET: 8; 2 TABLET SUBLINGUAL at 08:11

## 2019-10-01 RX ADMIN — TIOTROPIUM BROMIDE 18 MCG: 18 CAPSULE ORAL; RESPIRATORY (INHALATION) at 07:07

## 2019-10-01 RX ADMIN — ARIPIPRAZOLE 5 MG: 10 TABLET ORAL at 08:11

## 2019-10-01 RX ADMIN — INSULIN LISPRO 1 UNITS: 100 INJECTION, SOLUTION INTRAVENOUS; SUBCUTANEOUS at 08:16

## 2019-10-01 ASSESSMENT — PAIN DESCRIPTION - LOCATION
LOCATION: JAW;MOUTH
LOCATION: JAW

## 2019-10-01 ASSESSMENT — PAIN DESCRIPTION - FREQUENCY
FREQUENCY: INTERMITTENT
FREQUENCY: INTERMITTENT

## 2019-10-01 ASSESSMENT — PAIN DESCRIPTION - DESCRIPTORS
DESCRIPTORS: THROBBING
DESCRIPTORS: THROBBING

## 2019-10-01 ASSESSMENT — PAIN SCALES - GENERAL
PAINLEVEL_OUTOF10: 3
PAINLEVEL_OUTOF10: 7
PAINLEVEL_OUTOF10: 6
PAINLEVEL_OUTOF10: 6

## 2019-10-01 ASSESSMENT — PAIN DESCRIPTION - PROGRESSION
CLINICAL_PROGRESSION: NOT CHANGED
CLINICAL_PROGRESSION: GRADUALLY WORSENING

## 2019-10-01 ASSESSMENT — PAIN DESCRIPTION - ONSET
ONSET: GRADUAL
ONSET: AWAKENED FROM SLEEP

## 2019-10-01 ASSESSMENT — PAIN DESCRIPTION - ORIENTATION
ORIENTATION: RIGHT
ORIENTATION: RIGHT

## 2019-10-01 ASSESSMENT — PAIN DESCRIPTION - PAIN TYPE
TYPE: ACUTE PAIN
TYPE: ACUTE PAIN

## 2019-10-01 ASSESSMENT — PAIN - FUNCTIONAL ASSESSMENT
PAIN_FUNCTIONAL_ASSESSMENT: ACTIVITIES ARE NOT PREVENTED
PAIN_FUNCTIONAL_ASSESSMENT: ACTIVITIES ARE NOT PREVENTED

## 2019-10-03 ENCOUNTER — TELEPHONE (OUTPATIENT)
Dept: CARDIOLOGY CLINIC | Age: 54
End: 2019-10-03

## 2019-10-03 ENCOUNTER — FOLLOWUP TELEPHONE ENCOUNTER (OUTPATIENT)
Dept: TELEMETRY | Age: 54
End: 2019-10-03

## 2019-10-07 ENCOUNTER — TELEPHONE (OUTPATIENT)
Dept: VASCULAR SURGERY | Age: 54
End: 2019-10-07

## 2019-10-07 RX ORDER — ALBUTEROL SULFATE 90 UG/1
AEROSOL, METERED RESPIRATORY (INHALATION)
Qty: 18 INHALER | Refills: 5 | Status: ON HOLD | OUTPATIENT
Start: 2019-10-07 | End: 2020-01-31 | Stop reason: HOSPADM

## 2019-10-08 ENCOUNTER — HOSPITAL ENCOUNTER (OUTPATIENT)
Dept: CARDIAC CATH/INVASIVE PROCEDURES | Age: 54
Discharge: HOME | End: 2019-10-08
Attending: SURGERY | Admitting: SURGERY
Payer: COMMERCIAL

## 2019-10-08 LAB
ALBUMIN SERPL-MCNC: 4.2 G/DL (ref 3.4–5)
ANION GAP SERPL CALCULATED.3IONS-SCNC: 16 MMOL/L (ref 3–16)
BUN BLDV-MCNC: 60 MG/DL (ref 7–20)
CALCIUM SERPL-MCNC: 9.5 MG/DL (ref 8.3–10.6)
CHLORIDE BLD-SCNC: 84 MMOL/L (ref 99–110)
CO2: 33 MMOL/L (ref 21–32)
CREAT SERPL-MCNC: 1.7 MG/DL (ref 0.6–1.1)
GFR AFRICAN AMERICAN: 38
GFR NON-AFRICAN AMERICAN: 31
GLUCOSE BLD-MCNC: 179 MG/DL (ref 70–99)
HCT VFR BLD CALC: 35.6 % (ref 36–48)
HEMOGLOBIN: 12.1 G/DL (ref 12–16)
INR BLD: 1.09 (ref 0.86–1.14)
MCH RBC QN AUTO: 29.7 PG (ref 26–34)
MCHC RBC AUTO-ENTMCNC: 33.9 G/DL (ref 31–36)
MCV RBC AUTO: 87.6 FL (ref 80–100)
PDW BLD-RTO: 14.8 % (ref 12.4–15.4)
PHOSPHORUS: 3.9 MG/DL (ref 2.5–4.9)
PLATELET # BLD: 349 K/UL (ref 135–450)
PMV BLD AUTO: 8.3 FL (ref 5–10.5)
POTASSIUM SERPL-SCNC: 2.8 MMOL/L (ref 3.5–5.1)
PROTHROMBIN TIME: 12.4 SEC (ref 9.8–13)
RBC # BLD: 4.06 M/UL (ref 4–5.2)
SODIUM BLD-SCNC: 133 MMOL/L (ref 136–145)
WBC # BLD: 11.3 K/UL (ref 4–11)

## 2019-10-08 PROCEDURE — 37226 PR REVSC OPN/PRQ FEM/POP W/STNT/ANGIOP SM VSL: CPT | Performed by: SURGERY

## 2019-10-08 PROCEDURE — C1894 INTRO/SHEATH, NON-LASER: HCPCS

## 2019-10-08 PROCEDURE — 75716 ARTERY X-RAYS ARMS/LEGS: CPT | Performed by: SURGERY

## 2019-10-08 PROCEDURE — C1760 CLOSURE DEV, VASC: HCPCS

## 2019-10-08 PROCEDURE — 99152 MOD SED SAME PHYS/QHP 5/>YRS: CPT

## 2019-10-08 PROCEDURE — 85027 COMPLETE CBC AUTOMATED: CPT

## 2019-10-08 PROCEDURE — 75716 ARTERY X-RAYS ARMS/LEGS: CPT

## 2019-10-08 PROCEDURE — 99153 MOD SED SAME PHYS/QHP EA: CPT

## 2019-10-08 PROCEDURE — 99152 MOD SED SAME PHYS/QHP 5/>YRS: CPT | Performed by: SURGERY

## 2019-10-08 PROCEDURE — 6360000004 HC RX CONTRAST MEDICATION

## 2019-10-08 PROCEDURE — 2500000003 HC RX 250 WO HCPCS

## 2019-10-08 PROCEDURE — 36556 INSERT NON-TUNNEL CV CATH: CPT | Performed by: SURGERY

## 2019-10-08 PROCEDURE — C1876 STENT, NON-COA/NON-COV W/DEL: HCPCS

## 2019-10-08 PROCEDURE — C1725 CATH, TRANSLUMIN NON-LASER: HCPCS

## 2019-10-08 PROCEDURE — 80069 RENAL FUNCTION PANEL: CPT

## 2019-10-08 PROCEDURE — 6370000000 HC RX 637 (ALT 250 FOR IP)

## 2019-10-08 PROCEDURE — 37226 HC FEM POP TERR PLASTY AND STENT: CPT

## 2019-10-08 PROCEDURE — 2709999900 HC NON-CHARGEABLE SUPPLY

## 2019-10-08 PROCEDURE — C1753 CATH, INTRAVAS ULTRASOUND: HCPCS

## 2019-10-08 PROCEDURE — C1887 CATHETER, GUIDING: HCPCS

## 2019-10-08 PROCEDURE — C1769 GUIDE WIRE: HCPCS

## 2019-10-08 PROCEDURE — 6360000002 HC RX W HCPCS

## 2019-10-08 PROCEDURE — 36556 INSERT NON-TUNNEL CV CATH: CPT

## 2019-10-08 PROCEDURE — 2580000003 HC RX 258

## 2019-10-08 PROCEDURE — 75774 ARTERY X-RAY EACH VESSEL: CPT

## 2019-10-08 PROCEDURE — 85610 PROTHROMBIN TIME: CPT

## 2019-10-08 RX ORDER — FENTANYL CITRATE 50 UG/ML
INJECTION, SOLUTION INTRAMUSCULAR; INTRAVENOUS
Status: DISCONTINUED | OUTPATIENT
Start: 2019-10-08 | End: 2019-10-08 | Stop reason: HOSPADM

## 2019-10-08 RX ORDER — FENTANYL CITRATE 50 UG/ML
25 INJECTION, SOLUTION INTRAMUSCULAR; INTRAVENOUS ONCE
Status: COMPLETED | OUTPATIENT
Start: 2019-10-08 | End: 2019-10-08

## 2019-10-08 RX ORDER — FENTANYL CITRATE 50 UG/ML
50 INJECTION, SOLUTION INTRAMUSCULAR; INTRAVENOUS ONCE
Status: COMPLETED | OUTPATIENT
Start: 2019-10-08 | End: 2019-10-08

## 2019-10-08 RX ORDER — CLOPIDOGREL BISULFATE 75 MG/1
150 TABLET ORAL ONCE
Status: COMPLETED | OUTPATIENT
Start: 2019-10-08 | End: 2019-10-08

## 2019-10-08 RX ORDER — POTASSIUM CHLORIDE 20 MEQ/1
60 TABLET, EXTENDED RELEASE ORAL ONCE
Status: COMPLETED | OUTPATIENT
Start: 2019-10-08 | End: 2019-10-08

## 2019-10-08 RX ORDER — FENTANYL CITRATE 50 UG/ML
50 INJECTION, SOLUTION INTRAMUSCULAR; INTRAVENOUS ONCE
Status: DISCONTINUED | OUTPATIENT
Start: 2019-10-08 | End: 2019-10-08 | Stop reason: HOSPADM

## 2019-10-08 RX ORDER — MIDAZOLAM HYDROCHLORIDE 5 MG/ML
INJECTION INTRAMUSCULAR; INTRAVENOUS
Status: DISCONTINUED | OUTPATIENT
Start: 2019-10-08 | End: 2019-10-08 | Stop reason: HOSPADM

## 2019-10-08 RX ORDER — HEPARIN SODIUM 1000 [USP'U]/ML
5000 INJECTION, SOLUTION INTRAVENOUS; SUBCUTANEOUS ONCE
Status: COMPLETED | OUTPATIENT
Start: 2019-10-08 | End: 2019-10-08

## 2019-10-08 RX ORDER — MIDAZOLAM HYDROCHLORIDE 1 MG/ML
1 INJECTION INTRAMUSCULAR; INTRAVENOUS ONCE
Status: COMPLETED | OUTPATIENT
Start: 2019-10-08 | End: 2019-10-08

## 2019-10-08 RX ORDER — SODIUM CHLORIDE 9 MG/ML
1000 INJECTION, SOLUTION INTRAVENOUS CONTINUOUS
Status: DISCONTINUED | OUTPATIENT
Start: 2019-10-08 | End: 2019-10-08 | Stop reason: HOSPADM

## 2019-10-08 RX ORDER — ASPIRIN 81 MG/1
81 TABLET, CHEWABLE ORAL ONCE
Status: COMPLETED | OUTPATIENT
Start: 2019-10-08 | End: 2019-10-08

## 2019-10-08 RX ORDER — FENTANYL CITRATE 50 UG/ML
INJECTION, SOLUTION INTRAMUSCULAR; INTRAVENOUS
Status: COMPLETED | OUTPATIENT
Start: 2019-10-08 | End: 2019-10-08

## 2019-10-08 RX ADMIN — MIDAZOLAM HYDROCHLORIDE 1 MG: 1 INJECTION INTRAMUSCULAR; INTRAVENOUS at 12:35

## 2019-10-08 RX ADMIN — FENTANYL CITRATE 25 MCG: 50 INJECTION, SOLUTION INTRAMUSCULAR; INTRAVENOUS at 12:02

## 2019-10-08 RX ADMIN — FENTANYL CITRATE 50 MCG: 50 INJECTION, SOLUTION INTRAMUSCULAR; INTRAVENOUS at 11:40

## 2019-10-08 RX ADMIN — MIDAZOLAM HYDROCHLORIDE 1 MG: 1 INJECTION INTRAMUSCULAR; INTRAVENOUS at 11:21

## 2019-10-08 RX ADMIN — FENTANYL CITRATE 25 MCG: 50 INJECTION, SOLUTION INTRAMUSCULAR; INTRAVENOUS at 11:46

## 2019-10-08 RX ADMIN — MIDAZOLAM HYDROCHLORIDE 1 MG: 1 INJECTION INTRAMUSCULAR; INTRAVENOUS at 12:21

## 2019-10-08 RX ADMIN — FENTANYL CITRATE 50 MCG: 50 INJECTION, SOLUTION INTRAMUSCULAR; INTRAVENOUS at 11:22

## 2019-10-08 RX ADMIN — FENTANYL CITRATE 25 MCG: 50 INJECTION, SOLUTION INTRAMUSCULAR; INTRAVENOUS at 12:30

## 2019-10-08 RX ADMIN — FENTANYL CITRATE 25 MCG: 50 INJECTION, SOLUTION INTRAMUSCULAR; INTRAVENOUS at 12:21

## 2019-10-08 RX ADMIN — FENTANYL CITRATE 50 MCG: 50 INJECTION, SOLUTION INTRAMUSCULAR; INTRAVENOUS at 12:35

## 2019-10-08 RX ADMIN — MIDAZOLAM HYDROCHLORIDE 1 MG: 1 INJECTION INTRAMUSCULAR; INTRAVENOUS at 12:45

## 2019-10-08 RX ADMIN — FENTANYL CITRATE 50 MCG: 50 INJECTION, SOLUTION INTRAMUSCULAR; INTRAVENOUS at 13:57

## 2019-10-08 RX ADMIN — ASPIRIN 81 MG: 81 TABLET, CHEWABLE ORAL at 13:16

## 2019-10-08 RX ADMIN — MIDAZOLAM HYDROCHLORIDE 1 MG: 1 INJECTION INTRAMUSCULAR; INTRAVENOUS at 11:40

## 2019-10-08 RX ADMIN — HEPARIN SODIUM 5000 UNITS: 1000 INJECTION, SOLUTION INTRAVENOUS; SUBCUTANEOUS at 11:40

## 2019-10-08 RX ADMIN — SODIUM CHLORIDE 1000 ML: 9 INJECTION, SOLUTION INTRAVENOUS at 10:00

## 2019-10-08 RX ADMIN — CLOPIDOGREL BISULFATE 150 MG: 75 TABLET ORAL at 13:16

## 2019-10-08 RX ADMIN — POTASSIUM CHLORIDE 60 MEQ: 20 TABLET, EXTENDED RELEASE ORAL at 09:57

## 2019-10-08 RX ADMIN — MIDAZOLAM HYDROCHLORIDE 1 MG: 1 INJECTION INTRAMUSCULAR; INTRAVENOUS at 11:46

## 2019-10-08 ASSESSMENT — PAIN SCALES - GENERAL
PAINLEVEL_OUTOF10: 0

## 2019-10-14 ENCOUNTER — TELEPHONE (OUTPATIENT)
Dept: CARDIOLOGY CLINIC | Age: 54
End: 2019-10-14

## 2019-10-14 DIAGNOSIS — N18.30 CKD (CHRONIC KIDNEY DISEASE) STAGE 3, GFR 30-59 ML/MIN (HCC): ICD-10-CM

## 2019-10-14 DIAGNOSIS — I50.22 CHRONIC SYSTOLIC CONGESTIVE HEART FAILURE (HCC): Primary | ICD-10-CM

## 2019-10-14 DIAGNOSIS — I25.5 ISCHEMIC CARDIOMYOPATHY: ICD-10-CM

## 2019-10-14 DIAGNOSIS — E87.6 HYPOKALEMIA: ICD-10-CM

## 2019-10-21 ENCOUNTER — TELEPHONE (OUTPATIENT)
Dept: CARDIOLOGY CLINIC | Age: 54
End: 2019-10-21

## 2019-10-21 ENCOUNTER — HOSPITAL ENCOUNTER (OUTPATIENT)
Age: 54
Discharge: HOME | End: 2019-10-21
Payer: COMMERCIAL

## 2019-10-21 DIAGNOSIS — E11.22 TYPE 2 DIABETES MELLITUS WITH STAGE 3 CHRONIC KIDNEY DISEASE, WITH LONG-TERM CURRENT USE OF INSULIN (HCC): Chronic | ICD-10-CM

## 2019-10-21 DIAGNOSIS — N18.30 CKD (CHRONIC KIDNEY DISEASE) STAGE 3, GFR 30-59 ML/MIN (HCC): ICD-10-CM

## 2019-10-21 DIAGNOSIS — I50.22 CHRONIC SYSTOLIC CONGESTIVE HEART FAILURE (HCC): ICD-10-CM

## 2019-10-21 DIAGNOSIS — Z79.4 TYPE 2 DIABETES MELLITUS WITH STAGE 3 CHRONIC KIDNEY DISEASE, WITH LONG-TERM CURRENT USE OF INSULIN (HCC): Chronic | ICD-10-CM

## 2019-10-21 DIAGNOSIS — I25.5 ISCHEMIC CARDIOMYOPATHY: ICD-10-CM

## 2019-10-21 DIAGNOSIS — R06.02 SOB (SHORTNESS OF BREATH): ICD-10-CM

## 2019-10-21 DIAGNOSIS — N18.30 TYPE 2 DIABETES MELLITUS WITH STAGE 3 CHRONIC KIDNEY DISEASE, WITH LONG-TERM CURRENT USE OF INSULIN (HCC): Chronic | ICD-10-CM

## 2019-10-21 DIAGNOSIS — E87.6 HYPOKALEMIA: ICD-10-CM

## 2019-10-21 DIAGNOSIS — Z79.899 MEDICATION MANAGEMENT: Primary | ICD-10-CM

## 2019-10-21 LAB
ANION GAP SERPL CALCULATED.3IONS-SCNC: 17 MMOL/L (ref 3–16)
BUN BLDV-MCNC: 55 MG/DL (ref 7–20)
CALCIUM SERPL-MCNC: 10.2 MG/DL (ref 8.3–10.6)
CHLORIDE BLD-SCNC: 76 MMOL/L (ref 99–110)
CHOLESTEROL, TOTAL: 178 MG/DL (ref 0–199)
CO2: 38 MMOL/L (ref 21–32)
CREAT SERPL-MCNC: 1.3 MG/DL (ref 0.6–1.1)
GFR AFRICAN AMERICAN: 51
GFR NON-AFRICAN AMERICAN: 42
GLUCOSE BLD-MCNC: 240 MG/DL (ref 70–99)
HDLC SERPL-MCNC: 31 MG/DL (ref 40–60)
LDL CHOLESTEROL CALCULATED: 91 MG/DL
POTASSIUM SERPL-SCNC: 2.9 MMOL/L (ref 3.5–5.1)
PRO-BNP: 1734 PG/ML (ref 0–124)
SODIUM BLD-SCNC: 131 MMOL/L (ref 136–145)
T4 FREE: 1.4 NG/DL (ref 0.9–1.8)
TRIGL SERPL-MCNC: 278 MG/DL (ref 0–150)
TSH SERPL DL<=0.05 MIU/L-ACNC: 3.35 UIU/ML (ref 0.27–4.2)
VITAMIN D 25-HYDROXY: 12.7 NG/ML
VLDLC SERPL CALC-MCNC: 56 MG/DL

## 2019-10-21 PROCEDURE — 84681 ASSAY OF C-PEPTIDE: CPT

## 2019-10-21 PROCEDURE — 86341 ISLET CELL ANTIBODY: CPT

## 2019-10-21 PROCEDURE — 86337 INSULIN ANTIBODIES: CPT

## 2019-10-21 PROCEDURE — 84439 ASSAY OF FREE THYROXINE: CPT

## 2019-10-21 PROCEDURE — 83880 ASSAY OF NATRIURETIC PEPTIDE: CPT

## 2019-10-21 PROCEDURE — 82306 VITAMIN D 25 HYDROXY: CPT

## 2019-10-21 PROCEDURE — 84443 ASSAY THYROID STIM HORMONE: CPT

## 2019-10-21 PROCEDURE — 80061 LIPID PANEL: CPT

## 2019-10-21 PROCEDURE — 80048 BASIC METABOLIC PNL TOTAL CA: CPT

## 2019-10-21 PROCEDURE — 36415 COLL VENOUS BLD VENIPUNCTURE: CPT

## 2019-10-22 LAB — GLUTAMIC ACID DECARB AB: <5 IU/ML (ref 0–5)

## 2019-10-22 RX ORDER — POTASSIUM CHLORIDE 20 MEQ/1
40 TABLET, EXTENDED RELEASE ORAL 4 TIMES DAILY
Qty: 240 TABLET | Refills: 5 | Status: ON HOLD | OUTPATIENT
Start: 2019-10-22 | End: 2020-01-12 | Stop reason: HOSPADM

## 2019-10-23 ENCOUNTER — HOSPITAL ENCOUNTER (OUTPATIENT)
Age: 54
Discharge: HOME | End: 2019-10-23
Payer: COMMERCIAL

## 2019-10-23 DIAGNOSIS — Z79.899 MEDICATION MANAGEMENT: ICD-10-CM

## 2019-10-23 LAB
ANION GAP SERPL CALCULATED.3IONS-SCNC: 14 MMOL/L (ref 3–16)
BUN BLDV-MCNC: 44 MG/DL (ref 7–20)
CALCIUM SERPL-MCNC: 10.6 MG/DL (ref 8.3–10.6)
CHLORIDE BLD-SCNC: 85 MMOL/L (ref 99–110)
CO2: 31 MMOL/L (ref 21–32)
CREAT SERPL-MCNC: 1.4 MG/DL (ref 0.6–1.1)
GFR AFRICAN AMERICAN: 47
GFR NON-AFRICAN AMERICAN: 39
GLUCOSE BLD-MCNC: 418 MG/DL (ref 70–99)
INSULIN A: <0.4 U/ML (ref 0–0.4)
MAGNESIUM: 1.8 MG/DL (ref 1.8–2.4)
POTASSIUM SERPL-SCNC: 5.2 MMOL/L (ref 3.5–5.1)
SODIUM BLD-SCNC: 130 MMOL/L (ref 136–145)

## 2019-10-23 PROCEDURE — 80048 BASIC METABOLIC PNL TOTAL CA: CPT

## 2019-10-23 PROCEDURE — 36415 COLL VENOUS BLD VENIPUNCTURE: CPT

## 2019-10-23 PROCEDURE — 83735 ASSAY OF MAGNESIUM: CPT

## 2019-10-24 ENCOUNTER — TELEPHONE (OUTPATIENT)
Dept: CARDIOLOGY CLINIC | Age: 54
End: 2019-10-24

## 2019-10-24 ENCOUNTER — PROCEDURE VISIT (OUTPATIENT)
Dept: CARDIOLOGY CLINIC | Age: 54
End: 2019-10-24
Payer: COMMERCIAL

## 2019-10-24 DIAGNOSIS — I50.21 ACUTE SYSTOLIC CONGESTIVE HEART FAILURE (HCC): Primary | ICD-10-CM

## 2019-10-24 DIAGNOSIS — I50.22 CHRONIC SYSTOLIC CONGESTIVE HEART FAILURE (HCC): ICD-10-CM

## 2019-10-24 PROCEDURE — 93290 INTERROG DEV EVAL ICPMS IP: CPT | Performed by: NURSE PRACTITIONER

## 2019-10-25 LAB — C-PEPTIDE: 8.5 NG/ML (ref 1.1–4.4)

## 2019-10-28 ENCOUNTER — HOSPITAL ENCOUNTER (OUTPATIENT)
Age: 54
Discharge: HOME | End: 2019-10-28
Payer: COMMERCIAL

## 2019-10-28 ENCOUNTER — TELEPHONE (OUTPATIENT)
Dept: CARDIOLOGY CLINIC | Age: 54
End: 2019-10-28

## 2019-10-28 DIAGNOSIS — I50.21 ACUTE SYSTOLIC CONGESTIVE HEART FAILURE (HCC): ICD-10-CM

## 2019-10-28 DIAGNOSIS — R06.02 SOB (SHORTNESS OF BREATH): ICD-10-CM

## 2019-10-28 DIAGNOSIS — I50.22 CHRONIC SYSTOLIC CONGESTIVE HEART FAILURE (HCC): ICD-10-CM

## 2019-10-28 DIAGNOSIS — I25.5 ISCHEMIC CARDIOMYOPATHY: ICD-10-CM

## 2019-10-28 LAB
ANION GAP SERPL CALCULATED.3IONS-SCNC: 16 MMOL/L (ref 3–16)
BUN BLDV-MCNC: 43 MG/DL (ref 7–20)
CALCIUM SERPL-MCNC: 9.7 MG/DL (ref 8.3–10.6)
CHLORIDE BLD-SCNC: 87 MMOL/L (ref 99–110)
CO2: 27 MMOL/L (ref 21–32)
CREAT SERPL-MCNC: 1.7 MG/DL (ref 0.6–1.1)
GFR AFRICAN AMERICAN: 38
GFR NON-AFRICAN AMERICAN: 31
GLUCOSE BLD-MCNC: 267 MG/DL (ref 70–99)
POTASSIUM SERPL-SCNC: 4.9 MMOL/L (ref 3.5–5.1)
PRO-BNP: 1527 PG/ML (ref 0–124)
SODIUM BLD-SCNC: 130 MMOL/L (ref 136–145)

## 2019-10-28 PROCEDURE — 83880 ASSAY OF NATRIURETIC PEPTIDE: CPT

## 2019-10-28 PROCEDURE — 36415 COLL VENOUS BLD VENIPUNCTURE: CPT

## 2019-10-28 PROCEDURE — 80048 BASIC METABOLIC PNL TOTAL CA: CPT

## 2019-10-29 ENCOUNTER — OFFICE VISIT (OUTPATIENT)
Dept: ENDOCRINOLOGY | Age: 54
End: 2019-10-29
Payer: COMMERCIAL

## 2019-10-29 ENCOUNTER — TELEPHONE (OUTPATIENT)
Dept: CARDIOLOGY CLINIC | Age: 54
End: 2019-10-29

## 2019-10-29 ENCOUNTER — TELEPHONE (OUTPATIENT)
Dept: ENDOCRINOLOGY | Age: 54
End: 2019-10-29

## 2019-10-29 VITALS
SYSTOLIC BLOOD PRESSURE: 132 MMHG | BODY MASS INDEX: 38.14 KG/M2 | RESPIRATION RATE: 18 BRPM | WEIGHT: 223.4 LBS | OXYGEN SATURATION: 98 % | DIASTOLIC BLOOD PRESSURE: 86 MMHG | HEIGHT: 64 IN | HEART RATE: 100 BPM

## 2019-10-29 DIAGNOSIS — I10 ESSENTIAL HYPERTENSION: Chronic | ICD-10-CM

## 2019-10-29 DIAGNOSIS — E11.22 TYPE 2 DIABETES MELLITUS WITH STAGE 3 CHRONIC KIDNEY DISEASE, WITH LONG-TERM CURRENT USE OF INSULIN (HCC): Primary | Chronic | ICD-10-CM

## 2019-10-29 DIAGNOSIS — E55.9 VITAMIN D DEFICIENCY: ICD-10-CM

## 2019-10-29 DIAGNOSIS — E11.69 DIABETES MELLITUS TYPE 2 IN OBESE (HCC): ICD-10-CM

## 2019-10-29 DIAGNOSIS — E66.9 DIABETES MELLITUS TYPE 2 IN OBESE (HCC): ICD-10-CM

## 2019-10-29 DIAGNOSIS — G47.33 OSA (OBSTRUCTIVE SLEEP APNEA): Chronic | ICD-10-CM

## 2019-10-29 DIAGNOSIS — E78.2 MIXED HYPERLIPIDEMIA: Chronic | ICD-10-CM

## 2019-10-29 DIAGNOSIS — Z79.4 TYPE 2 DIABETES MELLITUS WITH STAGE 3 CHRONIC KIDNEY DISEASE, WITH LONG-TERM CURRENT USE OF INSULIN (HCC): Primary | Chronic | ICD-10-CM

## 2019-10-29 DIAGNOSIS — N18.30 TYPE 2 DIABETES MELLITUS WITH STAGE 3 CHRONIC KIDNEY DISEASE, WITH LONG-TERM CURRENT USE OF INSULIN (HCC): Primary | Chronic | ICD-10-CM

## 2019-10-29 PROBLEM — E10.10 DKA, TYPE 1, NOT AT GOAL (HCC): Status: RESOLVED | Noted: 2019-04-08 | Resolved: 2019-10-29

## 2019-10-29 PROCEDURE — 3046F HEMOGLOBIN A1C LEVEL >9.0%: CPT | Performed by: NURSE PRACTITIONER

## 2019-10-29 PROCEDURE — G8417 CALC BMI ABV UP PARAM F/U: HCPCS | Performed by: NURSE PRACTITIONER

## 2019-10-29 PROCEDURE — G8482 FLU IMMUNIZE ORDER/ADMIN: HCPCS | Performed by: NURSE PRACTITIONER

## 2019-10-29 PROCEDURE — 99214 OFFICE O/P EST MOD 30 MIN: CPT | Performed by: NURSE PRACTITIONER

## 2019-10-29 PROCEDURE — G8427 DOCREV CUR MEDS BY ELIG CLIN: HCPCS | Performed by: NURSE PRACTITIONER

## 2019-10-29 PROCEDURE — G8598 ASA/ANTIPLAT THER USED: HCPCS | Performed by: NURSE PRACTITIONER

## 2019-10-29 PROCEDURE — 3017F COLORECTAL CA SCREEN DOC REV: CPT | Performed by: NURSE PRACTITIONER

## 2019-10-29 PROCEDURE — 95251 CONT GLUC MNTR ANALYSIS I&R: CPT | Performed by: NURSE PRACTITIONER

## 2019-10-29 PROCEDURE — 4004F PT TOBACCO SCREEN RCVD TLK: CPT | Performed by: NURSE PRACTITIONER

## 2019-10-29 PROCEDURE — 2022F DILAT RTA XM EVC RTNOPTHY: CPT | Performed by: NURSE PRACTITIONER

## 2019-10-29 PROCEDURE — 1111F DSCHRG MED/CURRENT MED MERGE: CPT | Performed by: NURSE PRACTITIONER

## 2019-10-29 RX ORDER — INSULIN GLARGINE 100 [IU]/ML
45 INJECTION, SOLUTION SUBCUTANEOUS 2 TIMES DAILY
Qty: 3 VIAL | Refills: 3 | Status: SHIPPED | OUTPATIENT
Start: 2019-10-29 | End: 2019-10-30

## 2019-10-29 RX ORDER — INSULIN LISPRO 100 [IU]/ML
25 INJECTION, SOLUTION INTRAVENOUS; SUBCUTANEOUS 3 TIMES DAILY
Qty: 10 PEN | Refills: 2 | Status: SHIPPED | OUTPATIENT
Start: 2019-10-29 | End: 2019-12-25

## 2019-10-29 RX ORDER — ERGOCALCIFEROL 1.25 MG/1
50000 CAPSULE ORAL WEEKLY
Qty: 5 CAPSULE | Refills: 5 | Status: SHIPPED | OUTPATIENT
Start: 2019-10-29 | End: 2020-04-07

## 2019-10-29 ASSESSMENT — ENCOUNTER SYMPTOMS
COLOR CHANGE: 0
SHORTNESS OF BREATH: 0
DIARRHEA: 0
NAUSEA: 0
CONSTIPATION: 0
EYE PAIN: 0

## 2019-10-31 ENCOUNTER — OFFICE VISIT (OUTPATIENT)
Dept: ORTHOPEDIC SURGERY | Age: 54
End: 2019-10-31
Payer: COMMERCIAL

## 2019-10-31 VITALS — BODY MASS INDEX: 37.15 KG/M2 | HEIGHT: 65 IN | WEIGHT: 223 LBS

## 2019-10-31 DIAGNOSIS — M25.511 RIGHT SHOULDER PAIN, UNSPECIFIED CHRONICITY: Primary | ICD-10-CM

## 2019-10-31 PROCEDURE — 20610 DRAIN/INJ JOINT/BURSA W/O US: CPT | Performed by: ORTHOPAEDIC SURGERY

## 2019-10-31 PROCEDURE — 99213 OFFICE O/P EST LOW 20 MIN: CPT | Performed by: ORTHOPAEDIC SURGERY

## 2019-10-31 RX ORDER — TRIAMCINOLONE ACETONIDE 40 MG/ML
80 INJECTION, SUSPENSION INTRA-ARTICULAR; INTRAMUSCULAR ONCE
Status: COMPLETED | OUTPATIENT
Start: 2019-10-31 | End: 2019-10-31

## 2019-10-31 RX ADMIN — TRIAMCINOLONE ACETONIDE 80 MG: 40 INJECTION, SUSPENSION INTRA-ARTICULAR; INTRAMUSCULAR at 15:03

## 2019-11-05 ENCOUNTER — OFFICE VISIT (OUTPATIENT)
Dept: PULMONOLOGY | Age: 54
End: 2019-11-05
Payer: COMMERCIAL

## 2019-11-05 VITALS
RESPIRATION RATE: 18 BRPM | HEIGHT: 65 IN | OXYGEN SATURATION: 98 % | BODY MASS INDEX: 36.75 KG/M2 | SYSTOLIC BLOOD PRESSURE: 115 MMHG | HEART RATE: 81 BPM | TEMPERATURE: 98 F | WEIGHT: 220.6 LBS | DIASTOLIC BLOOD PRESSURE: 62 MMHG

## 2019-11-05 DIAGNOSIS — R91.8 PULMONARY NODULES: ICD-10-CM

## 2019-11-05 DIAGNOSIS — G47.33 MODERATE OBSTRUCTIVE SLEEP APNEA: ICD-10-CM

## 2019-11-05 DIAGNOSIS — Z87.891 PERSONAL HISTORY OF SMOKING: ICD-10-CM

## 2019-11-05 DIAGNOSIS — J42 CHRONIC BRONCHITIS, UNSPECIFIED CHRONIC BRONCHITIS TYPE (HCC): Primary | ICD-10-CM

## 2019-11-05 PROCEDURE — G8427 DOCREV CUR MEDS BY ELIG CLIN: HCPCS | Performed by: INTERNAL MEDICINE

## 2019-11-05 PROCEDURE — 3017F COLORECTAL CA SCREEN DOC REV: CPT | Performed by: INTERNAL MEDICINE

## 2019-11-05 PROCEDURE — G8417 CALC BMI ABV UP PARAM F/U: HCPCS | Performed by: INTERNAL MEDICINE

## 2019-11-05 PROCEDURE — 1036F TOBACCO NON-USER: CPT | Performed by: INTERNAL MEDICINE

## 2019-11-05 PROCEDURE — 3023F SPIROM DOC REV: CPT | Performed by: INTERNAL MEDICINE

## 2019-11-05 PROCEDURE — G8598 ASA/ANTIPLAT THER USED: HCPCS | Performed by: INTERNAL MEDICINE

## 2019-11-05 PROCEDURE — 99214 OFFICE O/P EST MOD 30 MIN: CPT | Performed by: INTERNAL MEDICINE

## 2019-11-05 PROCEDURE — G8482 FLU IMMUNIZE ORDER/ADMIN: HCPCS | Performed by: INTERNAL MEDICINE

## 2019-11-05 PROCEDURE — G8926 SPIRO NO PERF OR DOC: HCPCS | Performed by: INTERNAL MEDICINE

## 2019-11-05 RX ORDER — INSULIN GLARGINE 100 [IU]/ML
INJECTION, SOLUTION SUBCUTANEOUS
Refills: 5 | COMMUNITY
Start: 2019-10-28 | End: 2019-12-03

## 2019-11-08 ENCOUNTER — OFFICE VISIT (OUTPATIENT)
Dept: VASCULAR SURGERY | Age: 54
End: 2019-11-08

## 2019-11-08 VITALS
WEIGHT: 225 LBS | BODY MASS INDEX: 37.49 KG/M2 | DIASTOLIC BLOOD PRESSURE: 60 MMHG | HEIGHT: 65 IN | SYSTOLIC BLOOD PRESSURE: 90 MMHG

## 2019-11-08 DIAGNOSIS — Z95.828 H/O EXTREMITY BYPASS GRAFT: ICD-10-CM

## 2019-11-08 DIAGNOSIS — Z09 POSTOP CHECK: Primary | ICD-10-CM

## 2019-11-08 DIAGNOSIS — I73.9 PVD (PERIPHERAL VASCULAR DISEASE) (HCC): ICD-10-CM

## 2019-11-08 PROCEDURE — 99024 POSTOP FOLLOW-UP VISIT: CPT | Performed by: SURGERY

## 2019-11-11 ENCOUNTER — TELEPHONE (OUTPATIENT)
Dept: ENDOCRINOLOGY | Age: 54
End: 2019-11-11

## 2019-11-11 ENCOUNTER — TELEPHONE (OUTPATIENT)
Dept: CARDIOLOGY CLINIC | Age: 54
End: 2019-11-11

## 2019-11-12 ENCOUNTER — TELEPHONE (OUTPATIENT)
Dept: ENDOCRINOLOGY | Age: 54
End: 2019-11-12

## 2019-11-26 RX ORDER — CARVEDILOL 3.12 MG/1
TABLET ORAL
Qty: 60 TABLET | Refills: 5 | Status: ON HOLD | OUTPATIENT
Start: 2019-11-26 | End: 2020-01-31 | Stop reason: HOSPADM

## 2019-11-27 ENCOUNTER — PROCEDURE VISIT (OUTPATIENT)
Dept: CARDIOLOGY CLINIC | Age: 54
End: 2019-11-27
Payer: COMMERCIAL

## 2019-11-27 DIAGNOSIS — I50.22 CHRONIC SYSTOLIC CONGESTIVE HEART FAILURE (HCC): ICD-10-CM

## 2019-11-27 PROCEDURE — 93264 REM MNTR WRLS P-ART PRS SNR: CPT | Performed by: NURSE PRACTITIONER

## 2019-12-03 ENCOUNTER — OFFICE VISIT (OUTPATIENT)
Dept: ENDOCRINOLOGY | Age: 54
End: 2019-12-03
Payer: COMMERCIAL

## 2019-12-03 VITALS
DIASTOLIC BLOOD PRESSURE: 60 MMHG | BODY MASS INDEX: 37.62 KG/M2 | HEIGHT: 65 IN | WEIGHT: 225.8 LBS | RESPIRATION RATE: 18 BRPM | HEART RATE: 100 BPM | OXYGEN SATURATION: 97 % | SYSTOLIC BLOOD PRESSURE: 102 MMHG

## 2019-12-03 DIAGNOSIS — E66.9 DIABETES MELLITUS TYPE 2 IN OBESE (HCC): Primary | ICD-10-CM

## 2019-12-03 DIAGNOSIS — E11.69 DIABETES MELLITUS TYPE 2 IN OBESE (HCC): Primary | ICD-10-CM

## 2019-12-03 LAB — HBA1C MFR BLD: 7.3 %

## 2019-12-03 PROCEDURE — 83036 HEMOGLOBIN GLYCOSYLATED A1C: CPT | Performed by: NURSE PRACTITIONER

## 2019-12-03 PROCEDURE — 99213 OFFICE O/P EST LOW 20 MIN: CPT | Performed by: NURSE PRACTITIONER

## 2019-12-03 PROCEDURE — 1036F TOBACCO NON-USER: CPT | Performed by: NURSE PRACTITIONER

## 2019-12-03 PROCEDURE — G8427 DOCREV CUR MEDS BY ELIG CLIN: HCPCS | Performed by: NURSE PRACTITIONER

## 2019-12-03 PROCEDURE — 2022F DILAT RTA XM EVC RTNOPTHY: CPT | Performed by: NURSE PRACTITIONER

## 2019-12-03 PROCEDURE — G8417 CALC BMI ABV UP PARAM F/U: HCPCS | Performed by: NURSE PRACTITIONER

## 2019-12-03 PROCEDURE — 3051F HG A1C>EQUAL 7.0%<8.0%: CPT | Performed by: NURSE PRACTITIONER

## 2019-12-03 PROCEDURE — 3017F COLORECTAL CA SCREEN DOC REV: CPT | Performed by: NURSE PRACTITIONER

## 2019-12-03 PROCEDURE — G8482 FLU IMMUNIZE ORDER/ADMIN: HCPCS | Performed by: NURSE PRACTITIONER

## 2019-12-03 PROCEDURE — G8598 ASA/ANTIPLAT THER USED: HCPCS | Performed by: NURSE PRACTITIONER

## 2019-12-03 RX ORDER — IBUPROFEN 200 MG
1 TABLET ORAL DAILY
Qty: 100 EACH | Refills: 3 | Status: SHIPPED | OUTPATIENT
Start: 2019-12-03 | End: 2019-12-03 | Stop reason: SDUPTHER

## 2019-12-03 RX ORDER — IBUPROFEN 200 MG
1 TABLET ORAL DAILY
Qty: 100 EACH | Refills: 3 | Status: SHIPPED | OUTPATIENT
Start: 2019-12-03

## 2019-12-03 ASSESSMENT — ENCOUNTER SYMPTOMS
COLOR CHANGE: 0
DIARRHEA: 0
NAUSEA: 0
EYE PAIN: 0
SHORTNESS OF BREATH: 0
CONSTIPATION: 0

## 2019-12-12 ENCOUNTER — OFFICE VISIT (OUTPATIENT)
Dept: ORTHOPEDIC SURGERY | Age: 54
End: 2019-12-12
Payer: COMMERCIAL

## 2019-12-12 VITALS — BODY MASS INDEX: 38.54 KG/M2 | WEIGHT: 225.75 LBS | RESPIRATION RATE: 16 BRPM | HEIGHT: 64 IN

## 2019-12-12 DIAGNOSIS — M54.50 LUMBAR PAIN: Primary | ICD-10-CM

## 2019-12-12 DIAGNOSIS — M25.511 RIGHT SHOULDER PAIN, UNSPECIFIED CHRONICITY: ICD-10-CM

## 2019-12-12 PROCEDURE — 99213 OFFICE O/P EST LOW 20 MIN: CPT | Performed by: ORTHOPAEDIC SURGERY

## 2019-12-17 ENCOUNTER — TELEPHONE (OUTPATIENT)
Dept: ORTHOPEDIC SURGERY | Age: 54
End: 2019-12-17

## 2019-12-19 ENCOUNTER — TELEPHONE (OUTPATIENT)
Dept: CARDIOLOGY CLINIC | Age: 54
End: 2019-12-19

## 2019-12-19 ENCOUNTER — TELEPHONE (OUTPATIENT)
Dept: ENDOCRINOLOGY | Age: 54
End: 2019-12-19

## 2019-12-19 DIAGNOSIS — E11.69 DIABETES MELLITUS TYPE 2 IN OBESE (HCC): Primary | ICD-10-CM

## 2019-12-19 DIAGNOSIS — E66.9 DIABETES MELLITUS TYPE 2 IN OBESE (HCC): Primary | ICD-10-CM

## 2019-12-21 ENCOUNTER — HOSPITAL ENCOUNTER (OUTPATIENT)
Dept: MRI IMAGING | Age: 54
Discharge: HOME | End: 2019-12-21
Payer: COMMERCIAL

## 2019-12-21 DIAGNOSIS — M25.511 RIGHT SHOULDER PAIN, UNSPECIFIED CHRONICITY: ICD-10-CM

## 2019-12-21 PROCEDURE — 73221 MRI JOINT UPR EXTREM W/O DYE: CPT

## 2019-12-24 ENCOUNTER — HOSPITAL ENCOUNTER (EMERGENCY)
Age: 54
Discharge: HOME | End: 2019-12-24
Attending: EMERGENCY MEDICINE
Payer: COMMERCIAL

## 2019-12-24 ENCOUNTER — APPOINTMENT (OUTPATIENT)
Dept: GENERAL RADIOLOGY | Age: 54
End: 2019-12-24
Payer: COMMERCIAL

## 2019-12-24 VITALS
SYSTOLIC BLOOD PRESSURE: 97 MMHG | RESPIRATION RATE: 18 BRPM | WEIGHT: 240 LBS | HEART RATE: 96 BPM | HEIGHT: 64 IN | BODY MASS INDEX: 40.97 KG/M2 | TEMPERATURE: 97.5 F | OXYGEN SATURATION: 100 % | DIASTOLIC BLOOD PRESSURE: 68 MMHG

## 2019-12-24 DIAGNOSIS — L03.115 CELLULITIS OF RIGHT FOOT: ICD-10-CM

## 2019-12-24 DIAGNOSIS — L97.519 ULCER OF RIGHT FOOT, UNSPECIFIED ULCER STAGE (HCC): Primary | ICD-10-CM

## 2019-12-24 DIAGNOSIS — R79.89 ELEVATED SERUM CREATININE: ICD-10-CM

## 2019-12-24 LAB
A/G RATIO: 1.2 (ref 1.1–2.2)
ALBUMIN SERPL-MCNC: 3.8 G/DL (ref 3.4–5)
ALP BLD-CCNC: 90 U/L (ref 40–129)
ALT SERPL-CCNC: 12 U/L (ref 10–40)
ANION GAP SERPL CALCULATED.3IONS-SCNC: 14 MMOL/L (ref 3–16)
AST SERPL-CCNC: 11 U/L (ref 15–37)
BASOPHILS ABSOLUTE: 0.1 K/UL (ref 0–0.2)
BASOPHILS RELATIVE PERCENT: 0.6 %
BILIRUB SERPL-MCNC: <0.2 MG/DL (ref 0–1)
BUN BLDV-MCNC: 70 MG/DL (ref 7–20)
CALCIUM SERPL-MCNC: 9.9 MG/DL (ref 8.3–10.6)
CHLORIDE BLD-SCNC: 90 MMOL/L (ref 99–110)
CO2: 27 MMOL/L (ref 21–32)
CREAT SERPL-MCNC: 2.2 MG/DL (ref 0.6–1.1)
EOSINOPHILS ABSOLUTE: 0.2 K/UL (ref 0–0.6)
EOSINOPHILS RELATIVE PERCENT: 1.8 %
GFR AFRICAN AMERICAN: 28
GFR NON-AFRICAN AMERICAN: 23
GLOBULIN: 3.2 G/DL
GLUCOSE BLD-MCNC: 61 MG/DL (ref 70–99)
HCT VFR BLD CALC: 30.2 % (ref 36–48)
HEMOGLOBIN: 9.7 G/DL (ref 12–16)
LACTIC ACID, SEPSIS: 1.5 MMOL/L (ref 0.4–1.9)
LYMPHOCYTES ABSOLUTE: 1.3 K/UL (ref 1–5.1)
LYMPHOCYTES RELATIVE PERCENT: 10.4 %
MCH RBC QN AUTO: 29 PG (ref 26–34)
MCHC RBC AUTO-ENTMCNC: 32.3 G/DL (ref 31–36)
MCV RBC AUTO: 90 FL (ref 80–100)
MONOCYTES ABSOLUTE: 0.7 K/UL (ref 0–1.3)
MONOCYTES RELATIVE PERCENT: 5.5 %
NEUTROPHILS ABSOLUTE: 10.1 K/UL (ref 1.7–7.7)
NEUTROPHILS RELATIVE PERCENT: 81.7 %
PDW BLD-RTO: 14.8 % (ref 12.4–15.4)
PLATELET # BLD: 337 K/UL (ref 135–450)
PMV BLD AUTO: 8.5 FL (ref 5–10.5)
POTASSIUM REFLEX MAGNESIUM: 5 MMOL/L (ref 3.5–5.1)
RBC # BLD: 3.35 M/UL (ref 4–5.2)
SODIUM BLD-SCNC: 131 MMOL/L (ref 136–145)
TOTAL PROTEIN: 7 G/DL (ref 6.4–8.2)
WBC # BLD: 12.3 K/UL (ref 4–11)

## 2019-12-24 PROCEDURE — 83605 ASSAY OF LACTIC ACID: CPT

## 2019-12-24 PROCEDURE — 87040 BLOOD CULTURE FOR BACTERIA: CPT

## 2019-12-24 PROCEDURE — 2500000003 HC RX 250 WO HCPCS: Performed by: PHYSICIAN ASSISTANT

## 2019-12-24 PROCEDURE — 73630 X-RAY EXAM OF FOOT: CPT

## 2019-12-24 PROCEDURE — 85025 COMPLETE CBC W/AUTO DIFF WBC: CPT

## 2019-12-24 PROCEDURE — 93005 ELECTROCARDIOGRAM TRACING: CPT | Performed by: EMERGENCY MEDICINE

## 2019-12-24 PROCEDURE — 99283 EMERGENCY DEPT VISIT LOW MDM: CPT

## 2019-12-24 PROCEDURE — 80053 COMPREHEN METABOLIC PANEL: CPT

## 2019-12-24 PROCEDURE — 96365 THER/PROPH/DIAG IV INF INIT: CPT

## 2019-12-24 PROCEDURE — 36415 COLL VENOUS BLD VENIPUNCTURE: CPT

## 2019-12-24 RX ORDER — CLINDAMYCIN PHOSPHATE 600 MG/50ML
600 INJECTION INTRAVENOUS ONCE
Status: COMPLETED | OUTPATIENT
Start: 2019-12-24 | End: 2019-12-24

## 2019-12-24 RX ORDER — CLINDAMYCIN HYDROCHLORIDE 150 MG/1
450 CAPSULE ORAL 3 TIMES DAILY
Qty: 90 CAPSULE | Refills: 0 | Status: SHIPPED | OUTPATIENT
Start: 2019-12-24 | End: 2019-12-24 | Stop reason: SDUPTHER

## 2019-12-24 RX ORDER — CLINDAMYCIN HYDROCHLORIDE 150 MG/1
300 CAPSULE ORAL 3 TIMES DAILY
Qty: 60 CAPSULE | Refills: 0 | Status: ON HOLD | OUTPATIENT
Start: 2019-12-24 | End: 2019-12-28 | Stop reason: HOSPADM

## 2019-12-24 RX ADMIN — CLINDAMYCIN PHOSPHATE 600 MG: 600 INJECTION, SOLUTION INTRAVENOUS at 19:17

## 2019-12-24 ASSESSMENT — PAIN DESCRIPTION - ORIENTATION: ORIENTATION: RIGHT

## 2019-12-24 ASSESSMENT — ENCOUNTER SYMPTOMS
VOMITING: 0
ROS SKIN COMMENTS: FOOT ULCER
SHORTNESS OF BREATH: 0
COLOR CHANGE: 1

## 2019-12-24 ASSESSMENT — PAIN DESCRIPTION - DESCRIPTORS: DESCRIPTORS: ACHING

## 2019-12-24 ASSESSMENT — PAIN DESCRIPTION - PAIN TYPE: TYPE: ACUTE PAIN

## 2019-12-24 ASSESSMENT — PAIN DESCRIPTION - LOCATION: LOCATION: FOOT

## 2019-12-24 ASSESSMENT — PAIN SCALES - GENERAL: PAINLEVEL_OUTOF10: 6

## 2019-12-25 ENCOUNTER — APPOINTMENT (OUTPATIENT)
Dept: CT IMAGING | Age: 54
End: 2019-12-25
Payer: COMMERCIAL

## 2019-12-25 ENCOUNTER — HOSPITAL ENCOUNTER (INPATIENT)
Age: 54
LOS: 3 days | Discharge: HOME | DRG: 469 | End: 2019-12-28
Attending: FAMILY MEDICINE | Admitting: FAMILY MEDICINE
Payer: COMMERCIAL

## 2019-12-25 ENCOUNTER — HOSPITAL ENCOUNTER (EMERGENCY)
Age: 54
Discharge: ANOTHER HOSPITAL | End: 2019-12-25
Attending: EMERGENCY MEDICINE
Payer: COMMERCIAL

## 2019-12-25 VITALS
DIASTOLIC BLOOD PRESSURE: 75 MMHG | HEIGHT: 65 IN | OXYGEN SATURATION: 94 % | RESPIRATION RATE: 16 BRPM | BODY MASS INDEX: 39.99 KG/M2 | WEIGHT: 240 LBS | HEART RATE: 98 BPM | TEMPERATURE: 97.9 F | SYSTOLIC BLOOD PRESSURE: 116 MMHG

## 2019-12-25 DIAGNOSIS — N28.9 ACUTE RENAL INSUFFICIENCY: Primary | ICD-10-CM

## 2019-12-25 DIAGNOSIS — L08.9 DIABETIC FOOT INFECTION (HCC): ICD-10-CM

## 2019-12-25 DIAGNOSIS — E11.628 DIABETIC FOOT INFECTION (HCC): ICD-10-CM

## 2019-12-25 PROBLEM — N17.9 ACUTE KIDNEY INJURY (HCC): Status: ACTIVE | Noted: 2019-12-25

## 2019-12-25 LAB
A/G RATIO: 1.1 (ref 1.1–2.2)
ALBUMIN SERPL-MCNC: 3.7 G/DL (ref 3.4–5)
ALP BLD-CCNC: 89 U/L (ref 40–129)
ALT SERPL-CCNC: 12 U/L (ref 10–40)
ANION GAP SERPL CALCULATED.3IONS-SCNC: 16 MMOL/L (ref 3–16)
AST SERPL-CCNC: 14 U/L (ref 15–37)
BASOPHILS ABSOLUTE: 0 K/UL (ref 0–0.2)
BASOPHILS RELATIVE PERCENT: 0.3 %
BILIRUB SERPL-MCNC: <0.2 MG/DL (ref 0–1)
BILIRUBIN URINE: NEGATIVE
BLOOD, URINE: NEGATIVE
BUN BLDV-MCNC: 78 MG/DL (ref 7–20)
CALCIUM SERPL-MCNC: 9.4 MG/DL (ref 8.3–10.6)
CHLORIDE BLD-SCNC: 88 MMOL/L (ref 99–110)
CLARITY: CLEAR
CO2: 25 MMOL/L (ref 21–32)
COLOR: YELLOW
CREAT SERPL-MCNC: 2.7 MG/DL (ref 0.6–1.1)
EKG ATRIAL RATE: 89 BPM
EKG DIAGNOSIS: NORMAL
EKG P AXIS: 70 DEGREES
EKG P-R INTERVAL: 194 MS
EKG Q-T INTERVAL: 390 MS
EKG QRS DURATION: 138 MS
EKG QTC CALCULATION (BAZETT): 474 MS
EKG R AXIS: -65 DEGREES
EKG T AXIS: 74 DEGREES
EKG VENTRICULAR RATE: 89 BPM
EOSINOPHILS ABSOLUTE: 0.2 K/UL (ref 0–0.6)
EOSINOPHILS RELATIVE PERCENT: 1.3 %
GFR AFRICAN AMERICAN: 22
GFR NON-AFRICAN AMERICAN: 18
GLOBULIN: 3.4 G/DL
GLUCOSE BLD-MCNC: 85 MG/DL (ref 70–99)
GLUCOSE URINE: NEGATIVE MG/DL
HCT VFR BLD CALC: 30.7 % (ref 36–48)
HEMOGLOBIN: 9.9 G/DL (ref 12–16)
KETONES, URINE: NEGATIVE MG/DL
LACTIC ACID: 1.4 MMOL/L (ref 0.4–2)
LEUKOCYTE ESTERASE, URINE: NEGATIVE
LYMPHOCYTES ABSOLUTE: 1.3 K/UL (ref 1–5.1)
LYMPHOCYTES RELATIVE PERCENT: 10.4 %
MCH RBC QN AUTO: 28.8 PG (ref 26–34)
MCHC RBC AUTO-ENTMCNC: 32.3 G/DL (ref 31–36)
MCV RBC AUTO: 89.2 FL (ref 80–100)
MICROSCOPIC EXAMINATION: NORMAL
MONOCYTES ABSOLUTE: 0.6 K/UL (ref 0–1.3)
MONOCYTES RELATIVE PERCENT: 5 %
NEUTROPHILS ABSOLUTE: 10.5 K/UL (ref 1.7–7.7)
NEUTROPHILS RELATIVE PERCENT: 83 %
NITRITE, URINE: NEGATIVE
PDW BLD-RTO: 15.2 % (ref 12.4–15.4)
PH UA: 6 (ref 5–8)
PLATELET # BLD: 350 K/UL (ref 135–450)
PMV BLD AUTO: 8.7 FL (ref 5–10.5)
POTASSIUM REFLEX MAGNESIUM: 4.7 MMOL/L (ref 3.5–5.1)
PROTEIN UA: NEGATIVE MG/DL
RBC # BLD: 3.44 M/UL (ref 4–5.2)
SODIUM BLD-SCNC: 129 MMOL/L (ref 136–145)
SPECIFIC GRAVITY UA: <=1.005 (ref 1–1.03)
TOTAL PROTEIN: 7.1 G/DL (ref 6.4–8.2)
URINE TYPE: NORMAL
UROBILINOGEN, URINE: 0.2 E.U./DL
WBC # BLD: 12.7 K/UL (ref 4–11)

## 2019-12-25 PROCEDURE — 74176 CT ABD & PELVIS W/O CONTRAST: CPT

## 2019-12-25 PROCEDURE — 36415 COLL VENOUS BLD VENIPUNCTURE: CPT

## 2019-12-25 PROCEDURE — 83605 ASSAY OF LACTIC ACID: CPT

## 2019-12-25 PROCEDURE — 96365 THER/PROPH/DIAG IV INF INIT: CPT

## 2019-12-25 PROCEDURE — 2580000003 HC RX 258: Performed by: EMERGENCY MEDICINE

## 2019-12-25 PROCEDURE — 81003 URINALYSIS AUTO W/O SCOPE: CPT

## 2019-12-25 PROCEDURE — 2500000003 HC RX 250 WO HCPCS: Performed by: EMERGENCY MEDICINE

## 2019-12-25 PROCEDURE — 85025 COMPLETE CBC W/AUTO DIFF WBC: CPT

## 2019-12-25 PROCEDURE — 99285 EMERGENCY DEPT VISIT HI MDM: CPT

## 2019-12-25 PROCEDURE — 1200000000 HC SEMI PRIVATE

## 2019-12-25 PROCEDURE — 93010 ELECTROCARDIOGRAM REPORT: CPT | Performed by: INTERNAL MEDICINE

## 2019-12-25 PROCEDURE — 80053 COMPREHEN METABOLIC PANEL: CPT

## 2019-12-25 RX ORDER — 0.9 % SODIUM CHLORIDE 0.9 %
500 INTRAVENOUS SOLUTION INTRAVENOUS ONCE
Status: COMPLETED | OUTPATIENT
Start: 2019-12-25 | End: 2019-12-25

## 2019-12-25 RX ORDER — NYSTATIN 100000 U/G
CREAM TOPICAL
Status: ON HOLD | COMMUNITY
Start: 2019-12-19 | End: 2020-01-31 | Stop reason: CLARIF

## 2019-12-25 RX ORDER — CLINDAMYCIN PHOSPHATE 600 MG/50ML
600 INJECTION INTRAVENOUS ONCE
Status: COMPLETED | OUTPATIENT
Start: 2019-12-25 | End: 2019-12-25

## 2019-12-25 RX ORDER — BENZTROPINE MESYLATE 1 MG/1
1 TABLET ORAL NIGHTLY
COMMUNITY
Start: 2019-12-13

## 2019-12-25 RX ORDER — 0.9 % SODIUM CHLORIDE 0.9 %
1000 INTRAVENOUS SOLUTION INTRAVENOUS ONCE
Status: DISCONTINUED | OUTPATIENT
Start: 2019-12-25 | End: 2019-12-25

## 2019-12-25 RX ADMIN — CLINDAMYCIN PHOSPHATE 600 MG: 600 INJECTION, SOLUTION INTRAVENOUS at 20:14

## 2019-12-25 RX ADMIN — SODIUM CHLORIDE 500 ML: 9 INJECTION, SOLUTION INTRAVENOUS at 20:15

## 2019-12-25 ASSESSMENT — PAIN DESCRIPTION - LOCATION: LOCATION: BACK

## 2019-12-25 ASSESSMENT — PAIN DESCRIPTION - FREQUENCY: FREQUENCY: INTERMITTENT

## 2019-12-25 ASSESSMENT — PAIN DESCRIPTION - DESCRIPTORS: DESCRIPTORS: PRESSURE

## 2019-12-25 ASSESSMENT — PAIN SCALES - GENERAL
PAINLEVEL_OUTOF10: 6
PAINLEVEL_OUTOF10: 0

## 2019-12-25 ASSESSMENT — PAIN DESCRIPTION - ORIENTATION: ORIENTATION: LEFT;RIGHT;LOWER

## 2019-12-25 ASSESSMENT — PAIN DESCRIPTION - PAIN TYPE: TYPE: ACUTE PAIN

## 2019-12-26 PROBLEM — L03.115 CELLULITIS OF BOTH FEET: Status: ACTIVE | Noted: 2019-12-26

## 2019-12-26 PROBLEM — L03.116 CELLULITIS OF BOTH FEET: Status: ACTIVE | Noted: 2019-12-26

## 2019-12-26 LAB
A/G RATIO: 1.1 (ref 1.1–2.2)
ALBUMIN SERPL-MCNC: 3.6 G/DL (ref 3.4–5)
ALP BLD-CCNC: 85 U/L (ref 40–129)
ALT SERPL-CCNC: 10 U/L (ref 10–40)
ANION GAP SERPL CALCULATED.3IONS-SCNC: 17 MMOL/L (ref 3–16)
AST SERPL-CCNC: 12 U/L (ref 15–37)
BASOPHILS ABSOLUTE: 0.1 K/UL (ref 0–0.2)
BASOPHILS RELATIVE PERCENT: 0.6 %
BILIRUB SERPL-MCNC: <0.2 MG/DL (ref 0–1)
BUN BLDV-MCNC: 67 MG/DL (ref 7–20)
C3 COMPLEMENT: 153.2 MG/DL (ref 90–180)
C4 COMPLEMENT: 29.6 MG/DL (ref 10–40)
CALCIUM SERPL-MCNC: 9.1 MG/DL (ref 8.3–10.6)
CHLORIDE BLD-SCNC: 89 MMOL/L (ref 99–110)
CO2: 25 MMOL/L (ref 21–32)
CREAT SERPL-MCNC: 2.3 MG/DL (ref 0.6–1.1)
EOSINOPHILS ABSOLUTE: 0.2 K/UL (ref 0–0.6)
EOSINOPHILS RELATIVE PERCENT: 2 %
GFR AFRICAN AMERICAN: 27
GFR NON-AFRICAN AMERICAN: 22
GLOBULIN: 3.2 G/DL
GLUCOSE BLD-MCNC: 112 MG/DL (ref 70–99)
GLUCOSE BLD-MCNC: 123 MG/DL (ref 70–99)
GLUCOSE BLD-MCNC: 158 MG/DL (ref 70–99)
GLUCOSE BLD-MCNC: 180 MG/DL (ref 70–99)
GLUCOSE BLD-MCNC: 254 MG/DL (ref 70–99)
HCT VFR BLD CALC: 28.9 % (ref 36–48)
HEMOGLOBIN: 9.9 G/DL (ref 12–16)
LYMPHOCYTES ABSOLUTE: 0.8 K/UL (ref 1–5.1)
LYMPHOCYTES RELATIVE PERCENT: 7 %
MCH RBC QN AUTO: 30 PG (ref 26–34)
MCHC RBC AUTO-ENTMCNC: 34.2 G/DL (ref 31–36)
MCV RBC AUTO: 87.8 FL (ref 80–100)
MONOCYTES ABSOLUTE: 0.5 K/UL (ref 0–1.3)
MONOCYTES RELATIVE PERCENT: 4.3 %
NEUTROPHILS ABSOLUTE: 9.8 K/UL (ref 1.7–7.7)
NEUTROPHILS RELATIVE PERCENT: 86.1 %
PDW BLD-RTO: 15.2 % (ref 12.4–15.4)
PERFORMED ON: ABNORMAL
PLATELET # BLD: 334 K/UL (ref 135–450)
PMV BLD AUTO: 8.6 FL (ref 5–10.5)
POTASSIUM SERPL-SCNC: 4.2 MMOL/L (ref 3.5–5.1)
RBC # BLD: 3.28 M/UL (ref 4–5.2)
SODIUM BLD-SCNC: 131 MMOL/L (ref 136–145)
TOTAL PROTEIN: 6.8 G/DL (ref 6.4–8.2)
WBC # BLD: 11.4 K/UL (ref 4–11)

## 2019-12-26 PROCEDURE — 1200000000 HC SEMI PRIVATE

## 2019-12-26 PROCEDURE — 36415 COLL VENOUS BLD VENIPUNCTURE: CPT

## 2019-12-26 PROCEDURE — 6370000000 HC RX 637 (ALT 250 FOR IP): Performed by: FAMILY MEDICINE

## 2019-12-26 PROCEDURE — 86160 COMPLEMENT ANTIGEN: CPT

## 2019-12-26 PROCEDURE — 6370000000 HC RX 637 (ALT 250 FOR IP): Performed by: STUDENT IN AN ORGANIZED HEALTH CARE EDUCATION/TRAINING PROGRAM

## 2019-12-26 PROCEDURE — 6360000002 HC RX W HCPCS: Performed by: STUDENT IN AN ORGANIZED HEALTH CARE EDUCATION/TRAINING PROGRAM

## 2019-12-26 PROCEDURE — 2580000003 HC RX 258: Performed by: INTERNAL MEDICINE

## 2019-12-26 PROCEDURE — 2500000003 HC RX 250 WO HCPCS: Performed by: STUDENT IN AN ORGANIZED HEALTH CARE EDUCATION/TRAINING PROGRAM

## 2019-12-26 PROCEDURE — 94150 VITAL CAPACITY TEST: CPT

## 2019-12-26 PROCEDURE — 85025 COMPLETE CBC W/AUTO DIFF WBC: CPT

## 2019-12-26 PROCEDURE — 80053 COMPREHEN METABOLIC PANEL: CPT

## 2019-12-26 PROCEDURE — 2580000003 HC RX 258: Performed by: STUDENT IN AN ORGANIZED HEALTH CARE EDUCATION/TRAINING PROGRAM

## 2019-12-26 PROCEDURE — 94640 AIRWAY INHALATION TREATMENT: CPT

## 2019-12-26 RX ORDER — CLOPIDOGREL BISULFATE 75 MG/1
75 TABLET ORAL DAILY
Status: DISCONTINUED | OUTPATIENT
Start: 2019-12-26 | End: 2019-12-28 | Stop reason: HOSPADM

## 2019-12-26 RX ORDER — DEXTROSE MONOHYDRATE 25 G/50ML
12.5 INJECTION, SOLUTION INTRAVENOUS PRN
Status: DISCONTINUED | OUTPATIENT
Start: 2019-12-26 | End: 2019-12-28 | Stop reason: HOSPADM

## 2019-12-26 RX ORDER — BUSPIRONE HYDROCHLORIDE 5 MG/1
30 TABLET ORAL 2 TIMES DAILY
Status: DISCONTINUED | OUTPATIENT
Start: 2019-12-26 | End: 2019-12-28 | Stop reason: HOSPADM

## 2019-12-26 RX ORDER — PANTOPRAZOLE SODIUM 40 MG/1
40 TABLET, DELAYED RELEASE ORAL 2 TIMES DAILY
Status: DISCONTINUED | OUTPATIENT
Start: 2019-12-26 | End: 2019-12-28 | Stop reason: HOSPADM

## 2019-12-26 RX ORDER — ATORVASTATIN CALCIUM 80 MG/1
80 TABLET, FILM COATED ORAL NIGHTLY
Status: DISCONTINUED | OUTPATIENT
Start: 2019-12-26 | End: 2019-12-28 | Stop reason: HOSPADM

## 2019-12-26 RX ORDER — DEXTROSE MONOHYDRATE 50 MG/ML
100 INJECTION, SOLUTION INTRAVENOUS PRN
Status: DISCONTINUED | OUTPATIENT
Start: 2019-12-26 | End: 2019-12-28 | Stop reason: HOSPADM

## 2019-12-26 RX ORDER — ALBUTEROL SULFATE 90 UG/1
1 AEROSOL, METERED RESPIRATORY (INHALATION) EVERY 6 HOURS PRN
Status: DISCONTINUED | OUTPATIENT
Start: 2019-12-26 | End: 2019-12-26

## 2019-12-26 RX ORDER — VARENICLINE TARTRATE 1 MG/1
0.5 TABLET, FILM COATED ORAL DAILY
Status: DISCONTINUED | OUTPATIENT
Start: 2019-12-26 | End: 2019-12-28 | Stop reason: HOSPADM

## 2019-12-26 RX ORDER — ALBUTEROL SULFATE 90 UG/1
1 AEROSOL, METERED RESPIRATORY (INHALATION) EVERY 4 HOURS PRN
Status: DISCONTINUED | OUTPATIENT
Start: 2019-12-26 | End: 2019-12-28 | Stop reason: HOSPADM

## 2019-12-26 RX ORDER — ARIPIPRAZOLE 10 MG/1
10 TABLET ORAL DAILY
Status: DISCONTINUED | OUTPATIENT
Start: 2019-12-26 | End: 2019-12-28 | Stop reason: HOSPADM

## 2019-12-26 RX ORDER — NYSTATIN 100000 U/G
CREAM TOPICAL 2 TIMES DAILY
Status: DISCONTINUED | OUTPATIENT
Start: 2019-12-26 | End: 2019-12-28 | Stop reason: HOSPADM

## 2019-12-26 RX ORDER — ACETAMINOPHEN 325 MG/1
650 TABLET ORAL EVERY 4 HOURS PRN
Status: DISCONTINUED | OUTPATIENT
Start: 2019-12-26 | End: 2019-12-28 | Stop reason: HOSPADM

## 2019-12-26 RX ORDER — CHOLECALCIFEROL (VITAMIN D3) 125 MCG
5 CAPSULE ORAL NIGHTLY PRN
Status: DISCONTINUED | OUTPATIENT
Start: 2019-12-27 | End: 2019-12-28 | Stop reason: HOSPADM

## 2019-12-26 RX ORDER — DIGOXIN 125 MCG
125 TABLET ORAL DAILY
Status: DISCONTINUED | OUTPATIENT
Start: 2019-12-26 | End: 2019-12-28 | Stop reason: HOSPADM

## 2019-12-26 RX ORDER — LEVOFLOXACIN 5 MG/ML
750 INJECTION, SOLUTION INTRAVENOUS
Status: DISCONTINUED | OUTPATIENT
Start: 2019-12-26 | End: 2019-12-28 | Stop reason: HOSPADM

## 2019-12-26 RX ORDER — NICOTINE POLACRILEX 4 MG
15 LOZENGE BUCCAL PRN
Status: DISCONTINUED | OUTPATIENT
Start: 2019-12-26 | End: 2019-12-28 | Stop reason: HOSPADM

## 2019-12-26 RX ORDER — SODIUM CHLORIDE 9 MG/ML
INJECTION, SOLUTION INTRAVENOUS CONTINUOUS
Status: DISCONTINUED | OUTPATIENT
Start: 2019-12-26 | End: 2019-12-28

## 2019-12-26 RX ORDER — ASPIRIN 81 MG/1
81 TABLET ORAL DAILY
Status: DISCONTINUED | OUTPATIENT
Start: 2019-12-26 | End: 2019-12-28 | Stop reason: HOSPADM

## 2019-12-26 RX ORDER — BUPRENORPHINE AND NALOXONE 8; 2 MG/1; MG/1
1 FILM, SOLUBLE BUCCAL; SUBLINGUAL 2 TIMES DAILY
Status: DISCONTINUED | OUTPATIENT
Start: 2019-12-26 | End: 2019-12-28 | Stop reason: HOSPADM

## 2019-12-26 RX ORDER — BENZTROPINE MESYLATE 1 MG/1
1 TABLET ORAL DAILY
Status: DISCONTINUED | OUTPATIENT
Start: 2019-12-26 | End: 2019-12-28 | Stop reason: HOSPADM

## 2019-12-26 RX ORDER — LAMOTRIGINE 100 MG/1
150 TABLET ORAL 2 TIMES DAILY
Status: DISCONTINUED | OUTPATIENT
Start: 2019-12-26 | End: 2019-12-28 | Stop reason: HOSPADM

## 2019-12-26 RX ADMIN — BUPRENORPHINE HYDROCHLORIDE, NALOXONE HYDROCHLORIDE 1 FILM: 8; 2 FILM, SOLUBLE BUCCAL; SUBLINGUAL at 02:09

## 2019-12-26 RX ADMIN — CLOPIDOGREL BISULFATE 75 MG: 75 TABLET ORAL at 09:16

## 2019-12-26 RX ADMIN — SODIUM CHLORIDE: 9 INJECTION, SOLUTION INTRAVENOUS at 02:03

## 2019-12-26 RX ADMIN — ATORVASTATIN CALCIUM 80 MG: 80 TABLET, FILM COATED ORAL at 02:27

## 2019-12-26 RX ADMIN — TIOTROPIUM BROMIDE 18 MCG: 18 CAPSULE ORAL; RESPIRATORY (INHALATION) at 08:21

## 2019-12-26 RX ADMIN — INSULIN LISPRO 3 UNITS: 100 INJECTION, SOLUTION INTRAVENOUS; SUBCUTANEOUS at 13:30

## 2019-12-26 RX ADMIN — ATORVASTATIN CALCIUM 80 MG: 80 TABLET, FILM COATED ORAL at 20:37

## 2019-12-26 RX ADMIN — METRONIDAZOLE 500 MG: 500 INJECTION, SOLUTION INTRAVENOUS at 19:36

## 2019-12-26 RX ADMIN — ASPIRIN 81 MG: 81 TABLET, COATED ORAL at 09:16

## 2019-12-26 RX ADMIN — BUSPIRONE HYDROCHLORIDE 30 MG: 5 TABLET ORAL at 02:09

## 2019-12-26 RX ADMIN — INSULIN LISPRO 5 UNITS: 100 INJECTION, SOLUTION INTRAVENOUS; SUBCUTANEOUS at 20:42

## 2019-12-26 RX ADMIN — LAMOTRIGINE 150 MG: 100 TABLET ORAL at 02:09

## 2019-12-26 RX ADMIN — VARENICLINE TARTRATE 0.5 MG: 1 TABLET, FILM COATED ORAL at 09:16

## 2019-12-26 RX ADMIN — PANTOPRAZOLE SODIUM 40 MG: 40 TABLET, DELAYED RELEASE ORAL at 17:04

## 2019-12-26 RX ADMIN — DIGOXIN 125 MCG: 125 TABLET ORAL at 09:16

## 2019-12-26 RX ADMIN — LEVOFLOXACIN 750 MG: 5 INJECTION, SOLUTION INTRAVENOUS at 02:03

## 2019-12-26 RX ADMIN — METRONIDAZOLE 500 MG: 500 INJECTION, SOLUTION INTRAVENOUS at 02:03

## 2019-12-26 RX ADMIN — BUSPIRONE HYDROCHLORIDE 30 MG: 5 TABLET ORAL at 20:37

## 2019-12-26 RX ADMIN — SODIUM CHLORIDE: 9 INJECTION, SOLUTION INTRAVENOUS at 20:37

## 2019-12-26 RX ADMIN — ARIPIPRAZOLE 10 MG: 10 TABLET ORAL at 09:16

## 2019-12-26 RX ADMIN — LAMOTRIGINE 150 MG: 100 TABLET ORAL at 09:16

## 2019-12-26 RX ADMIN — INSULIN LISPRO 3 UNITS: 100 INJECTION, SOLUTION INTRAVENOUS; SUBCUTANEOUS at 17:04

## 2019-12-26 RX ADMIN — BUPRENORPHINE HYDROCHLORIDE, NALOXONE HYDROCHLORIDE 1 FILM: 8; 2 FILM, SOLUBLE BUCCAL; SUBLINGUAL at 20:37

## 2019-12-26 RX ADMIN — BUSPIRONE HYDROCHLORIDE 30 MG: 5 TABLET ORAL at 09:16

## 2019-12-26 RX ADMIN — ACETAMINOPHEN 650 MG: 325 TABLET ORAL at 02:09

## 2019-12-26 RX ADMIN — BENZTROPINE MESYLATE 1 MG: 1 TABLET ORAL at 09:16

## 2019-12-26 RX ADMIN — BUPRENORPHINE HYDROCHLORIDE, NALOXONE HYDROCHLORIDE 1 FILM: 8; 2 FILM, SOLUBLE BUCCAL; SUBLINGUAL at 09:17

## 2019-12-26 RX ADMIN — ENOXAPARIN SODIUM 30 MG: 30 INJECTION SUBCUTANEOUS at 13:30

## 2019-12-26 RX ADMIN — LAMOTRIGINE 150 MG: 100 TABLET ORAL at 20:37

## 2019-12-26 RX ADMIN — PANTOPRAZOLE SODIUM 40 MG: 40 TABLET, DELAYED RELEASE ORAL at 09:16

## 2019-12-26 RX ADMIN — METRONIDAZOLE 500 MG: 500 INJECTION, SOLUTION INTRAVENOUS at 14:28

## 2019-12-26 ASSESSMENT — PAIN DESCRIPTION - PAIN TYPE
TYPE: ACUTE PAIN

## 2019-12-26 ASSESSMENT — PAIN SCALES - GENERAL
PAINLEVEL_OUTOF10: 6
PAINLEVEL_OUTOF10: 7
PAINLEVEL_OUTOF10: 6

## 2019-12-26 ASSESSMENT — PAIN DESCRIPTION - ORIENTATION
ORIENTATION: LEFT;LOWER

## 2019-12-26 ASSESSMENT — PAIN DESCRIPTION - LOCATION
LOCATION: BACK

## 2019-12-27 LAB
A/G RATIO: 1 (ref 1.1–2.2)
ALBUMIN SERPL-MCNC: 3.3 G/DL (ref 3.4–5)
ALP BLD-CCNC: 82 U/L (ref 40–129)
ALT SERPL-CCNC: 9 U/L (ref 10–40)
ANION GAP SERPL CALCULATED.3IONS-SCNC: 13 MMOL/L (ref 3–16)
AST SERPL-CCNC: 9 U/L (ref 15–37)
BASOPHILS ABSOLUTE: 0 K/UL (ref 0–0.2)
BASOPHILS RELATIVE PERCENT: 0.4 %
BILIRUB SERPL-MCNC: <0.2 MG/DL (ref 0–1)
BUN BLDV-MCNC: 62 MG/DL (ref 7–20)
CALCIUM SERPL-MCNC: 8.7 MG/DL (ref 8.3–10.6)
CHLORIDE BLD-SCNC: 92 MMOL/L (ref 99–110)
CO2: 24 MMOL/L (ref 21–32)
CREAT SERPL-MCNC: 1.8 MG/DL (ref 0.6–1.1)
EOSINOPHILS ABSOLUTE: 0.2 K/UL (ref 0–0.6)
EOSINOPHILS RELATIVE PERCENT: 2.2 %
GFR AFRICAN AMERICAN: 35
GFR NON-AFRICAN AMERICAN: 29
GLOBULIN: 3.3 G/DL
GLUCOSE BLD-MCNC: 145 MG/DL (ref 70–99)
GLUCOSE BLD-MCNC: 169 MG/DL (ref 70–99)
GLUCOSE BLD-MCNC: 189 MG/DL (ref 70–99)
GLUCOSE BLD-MCNC: 208 MG/DL (ref 70–99)
GLUCOSE BLD-MCNC: 215 MG/DL (ref 70–99)
HCT VFR BLD CALC: 28.6 % (ref 36–48)
HEMOGLOBIN: 9.8 G/DL (ref 12–16)
LYMPHOCYTES ABSOLUTE: 0.8 K/UL (ref 1–5.1)
LYMPHOCYTES RELATIVE PERCENT: 8.4 %
MCH RBC QN AUTO: 30.2 PG (ref 26–34)
MCHC RBC AUTO-ENTMCNC: 34.1 G/DL (ref 31–36)
MCV RBC AUTO: 88.6 FL (ref 80–100)
MONOCYTES ABSOLUTE: 0.5 K/UL (ref 0–1.3)
MONOCYTES RELATIVE PERCENT: 5.2 %
NEUTROPHILS ABSOLUTE: 8.1 K/UL (ref 1.7–7.7)
NEUTROPHILS RELATIVE PERCENT: 83.8 %
PDW BLD-RTO: 15.1 % (ref 12.4–15.4)
PERFORMED ON: ABNORMAL
PLATELET # BLD: 314 K/UL (ref 135–450)
PMV BLD AUTO: 8.4 FL (ref 5–10.5)
POTASSIUM SERPL-SCNC: 4.2 MMOL/L (ref 3.5–5.1)
RBC # BLD: 3.23 M/UL (ref 4–5.2)
SODIUM BLD-SCNC: 129 MMOL/L (ref 136–145)
TOTAL PROTEIN: 6.6 G/DL (ref 6.4–8.2)
WBC # BLD: 9.7 K/UL (ref 4–11)

## 2019-12-27 PROCEDURE — 80053 COMPREHEN METABOLIC PANEL: CPT

## 2019-12-27 PROCEDURE — 94640 AIRWAY INHALATION TREATMENT: CPT

## 2019-12-27 PROCEDURE — 36415 COLL VENOUS BLD VENIPUNCTURE: CPT

## 2019-12-27 PROCEDURE — 6370000000 HC RX 637 (ALT 250 FOR IP): Performed by: STUDENT IN AN ORGANIZED HEALTH CARE EDUCATION/TRAINING PROGRAM

## 2019-12-27 PROCEDURE — 2500000003 HC RX 250 WO HCPCS: Performed by: STUDENT IN AN ORGANIZED HEALTH CARE EDUCATION/TRAINING PROGRAM

## 2019-12-27 PROCEDURE — 2580000003 HC RX 258: Performed by: INTERNAL MEDICINE

## 2019-12-27 PROCEDURE — 85025 COMPLETE CBC W/AUTO DIFF WBC: CPT

## 2019-12-27 PROCEDURE — 1200000000 HC SEMI PRIVATE

## 2019-12-27 PROCEDURE — 6360000002 HC RX W HCPCS: Performed by: STUDENT IN AN ORGANIZED HEALTH CARE EDUCATION/TRAINING PROGRAM

## 2019-12-27 RX ORDER — INSULIN GLARGINE 100 [IU]/ML
5 INJECTION, SOLUTION SUBCUTANEOUS NIGHTLY
Status: DISCONTINUED | OUTPATIENT
Start: 2019-12-27 | End: 2019-12-28 | Stop reason: HOSPADM

## 2019-12-27 RX ADMIN — ARIPIPRAZOLE 10 MG: 10 TABLET ORAL at 08:55

## 2019-12-27 RX ADMIN — ASPIRIN 81 MG: 81 TABLET, COATED ORAL at 08:55

## 2019-12-27 RX ADMIN — LAMOTRIGINE 150 MG: 100 TABLET ORAL at 08:55

## 2019-12-27 RX ADMIN — INSULIN LISPRO 3 UNITS: 100 INJECTION, SOLUTION INTRAVENOUS; SUBCUTANEOUS at 17:51

## 2019-12-27 RX ADMIN — BUSPIRONE HYDROCHLORIDE 30 MG: 5 TABLET ORAL at 08:55

## 2019-12-27 RX ADMIN — BUSPIRONE HYDROCHLORIDE 30 MG: 5 TABLET ORAL at 21:33

## 2019-12-27 RX ADMIN — DIGOXIN 125 MCG: 125 TABLET ORAL at 08:59

## 2019-12-27 RX ADMIN — ACETAMINOPHEN 650 MG: 325 TABLET ORAL at 21:33

## 2019-12-27 RX ADMIN — METRONIDAZOLE 500 MG: 500 INJECTION, SOLUTION INTRAVENOUS at 08:56

## 2019-12-27 RX ADMIN — MELATONIN TAB 5 MG 5 MG: 5 TAB at 21:34

## 2019-12-27 RX ADMIN — SODIUM CHLORIDE: 9 INJECTION, SOLUTION INTRAVENOUS at 23:47

## 2019-12-27 RX ADMIN — METRONIDAZOLE 500 MG: 500 INJECTION, SOLUTION INTRAVENOUS at 17:50

## 2019-12-27 RX ADMIN — INSULIN LISPRO 6 UNITS: 100 INJECTION, SOLUTION INTRAVENOUS; SUBCUTANEOUS at 12:58

## 2019-12-27 RX ADMIN — ATORVASTATIN CALCIUM 80 MG: 80 TABLET, FILM COATED ORAL at 21:34

## 2019-12-27 RX ADMIN — INSULIN GLARGINE 5 UNITS: 100 INJECTION, SOLUTION SUBCUTANEOUS at 21:34

## 2019-12-27 RX ADMIN — LAMOTRIGINE 150 MG: 100 TABLET ORAL at 21:33

## 2019-12-27 RX ADMIN — METRONIDAZOLE 500 MG: 500 INJECTION, SOLUTION INTRAVENOUS at 02:26

## 2019-12-27 RX ADMIN — BUPRENORPHINE HYDROCHLORIDE, NALOXONE HYDROCHLORIDE 1 FILM: 8; 2 FILM, SOLUBLE BUCCAL; SUBLINGUAL at 09:11

## 2019-12-27 RX ADMIN — TIOTROPIUM BROMIDE 18 MCG: 18 CAPSULE ORAL; RESPIRATORY (INHALATION) at 09:06

## 2019-12-27 RX ADMIN — BENZTROPINE MESYLATE 1 MG: 1 TABLET ORAL at 08:56

## 2019-12-27 RX ADMIN — PANTOPRAZOLE SODIUM 40 MG: 40 TABLET, DELAYED RELEASE ORAL at 08:55

## 2019-12-27 RX ADMIN — CLOPIDOGREL BISULFATE 75 MG: 75 TABLET ORAL at 08:55

## 2019-12-27 RX ADMIN — PANTOPRAZOLE SODIUM 40 MG: 40 TABLET, DELAYED RELEASE ORAL at 15:40

## 2019-12-27 RX ADMIN — INSULIN LISPRO 3 UNITS: 100 INJECTION, SOLUTION INTRAVENOUS; SUBCUTANEOUS at 08:56

## 2019-12-27 RX ADMIN — INSULIN LISPRO 3 UNITS: 100 INJECTION, SOLUTION INTRAVENOUS; SUBCUTANEOUS at 21:34

## 2019-12-27 RX ADMIN — BUPRENORPHINE HYDROCHLORIDE, NALOXONE HYDROCHLORIDE 1 FILM: 8; 2 FILM, SOLUBLE BUCCAL; SUBLINGUAL at 21:34

## 2019-12-27 ASSESSMENT — PAIN SCALES - GENERAL
PAINLEVEL_OUTOF10: 8
PAINLEVEL_OUTOF10: 6
PAINLEVEL_OUTOF10: 7

## 2019-12-27 ASSESSMENT — PAIN DESCRIPTION - PAIN TYPE
TYPE: ACUTE PAIN

## 2019-12-27 ASSESSMENT — PAIN DESCRIPTION - ORIENTATION
ORIENTATION: LEFT;LOWER
ORIENTATION: LEFT;LOWER

## 2019-12-27 ASSESSMENT — PAIN DESCRIPTION - LOCATION
LOCATION: BACK

## 2019-12-28 VITALS
DIASTOLIC BLOOD PRESSURE: 62 MMHG | TEMPERATURE: 97.5 F | HEIGHT: 65 IN | WEIGHT: 242.8 LBS | BODY MASS INDEX: 40.45 KG/M2 | RESPIRATION RATE: 18 BRPM | SYSTOLIC BLOOD PRESSURE: 98 MMHG | HEART RATE: 92 BPM | OXYGEN SATURATION: 94 %

## 2019-12-28 LAB
A/G RATIO: 1 (ref 1.1–2.2)
ALBUMIN SERPL-MCNC: 3.3 G/DL (ref 3.4–5)
ALP BLD-CCNC: 78 U/L (ref 40–129)
ALT SERPL-CCNC: 8 U/L (ref 10–40)
ANION GAP SERPL CALCULATED.3IONS-SCNC: 12 MMOL/L (ref 3–16)
AST SERPL-CCNC: 8 U/L (ref 15–37)
BASOPHILS ABSOLUTE: 0 K/UL (ref 0–0.2)
BASOPHILS RELATIVE PERCENT: 0.4 %
BILIRUB SERPL-MCNC: <0.2 MG/DL (ref 0–1)
BUN BLDV-MCNC: 56 MG/DL (ref 7–20)
CALCIUM SERPL-MCNC: 8.8 MG/DL (ref 8.3–10.6)
CHLORIDE BLD-SCNC: 97 MMOL/L (ref 99–110)
CO2: 23 MMOL/L (ref 21–32)
CREAT SERPL-MCNC: 1.7 MG/DL (ref 0.6–1.1)
EOSINOPHILS ABSOLUTE: 0.2 K/UL (ref 0–0.6)
EOSINOPHILS RELATIVE PERCENT: 2.7 %
GFR AFRICAN AMERICAN: 38
GFR NON-AFRICAN AMERICAN: 31
GLOBULIN: 3.2 G/DL
GLUCOSE BLD-MCNC: 138 MG/DL (ref 70–99)
GLUCOSE BLD-MCNC: 143 MG/DL (ref 70–99)
GLUCOSE BLD-MCNC: 154 MG/DL (ref 70–99)
GLUCOSE BLD-MCNC: 166 MG/DL (ref 70–99)
HCT VFR BLD CALC: 26.5 % (ref 36–48)
HEMOGLOBIN: 9 G/DL (ref 12–16)
LYMPHOCYTES ABSOLUTE: 0.8 K/UL (ref 1–5.1)
LYMPHOCYTES RELATIVE PERCENT: 8.8 %
MCH RBC QN AUTO: 30.3 PG (ref 26–34)
MCHC RBC AUTO-ENTMCNC: 33.8 G/DL (ref 31–36)
MCV RBC AUTO: 89.4 FL (ref 80–100)
MONOCYTES ABSOLUTE: 0.6 K/UL (ref 0–1.3)
MONOCYTES RELATIVE PERCENT: 6.4 %
NEUTROPHILS ABSOLUTE: 7 K/UL (ref 1.7–7.7)
NEUTROPHILS RELATIVE PERCENT: 81.7 %
PDW BLD-RTO: 15.5 % (ref 12.4–15.4)
PERFORMED ON: ABNORMAL
PLATELET # BLD: 282 K/UL (ref 135–450)
PMV BLD AUTO: 8.2 FL (ref 5–10.5)
POTASSIUM REFLEX MAGNESIUM: 4.5 MMOL/L (ref 3.5–5.1)
PRO-BNP: 2343 PG/ML (ref 0–124)
RBC # BLD: 2.97 M/UL (ref 4–5.2)
SODIUM BLD-SCNC: 132 MMOL/L (ref 136–145)
TOTAL PROTEIN: 6.5 G/DL (ref 6.4–8.2)
WBC # BLD: 8.6 K/UL (ref 4–11)

## 2019-12-28 PROCEDURE — 6360000002 HC RX W HCPCS: Performed by: FAMILY MEDICINE

## 2019-12-28 PROCEDURE — 80053 COMPREHEN METABOLIC PANEL: CPT

## 2019-12-28 PROCEDURE — 94640 AIRWAY INHALATION TREATMENT: CPT

## 2019-12-28 PROCEDURE — 2500000003 HC RX 250 WO HCPCS: Performed by: STUDENT IN AN ORGANIZED HEALTH CARE EDUCATION/TRAINING PROGRAM

## 2019-12-28 PROCEDURE — 6370000000 HC RX 637 (ALT 250 FOR IP): Performed by: STUDENT IN AN ORGANIZED HEALTH CARE EDUCATION/TRAINING PROGRAM

## 2019-12-28 PROCEDURE — 85025 COMPLETE CBC W/AUTO DIFF WBC: CPT

## 2019-12-28 PROCEDURE — 36415 COLL VENOUS BLD VENIPUNCTURE: CPT

## 2019-12-28 PROCEDURE — 6360000002 HC RX W HCPCS: Performed by: STUDENT IN AN ORGANIZED HEALTH CARE EDUCATION/TRAINING PROGRAM

## 2019-12-28 PROCEDURE — 83880 ASSAY OF NATRIURETIC PEPTIDE: CPT

## 2019-12-28 RX ORDER — DOBUTAMINE HYDROCHLORIDE 200 MG/100ML
10 INJECTION INTRAVENOUS CONTINUOUS
Status: DISCONTINUED | OUTPATIENT
Start: 2019-12-28 | End: 2019-12-28

## 2019-12-28 RX ORDER — METRONIDAZOLE 500 MG/1
500 TABLET ORAL 3 TIMES DAILY
Qty: 16 TABLET | Refills: 0 | Status: SHIPPED | OUTPATIENT
Start: 2019-12-28 | End: 2020-01-03

## 2019-12-28 RX ORDER — LEVOFLOXACIN 750 MG/1
750 TABLET ORAL DAILY
Qty: 5 TABLET | Refills: 0 | Status: SHIPPED | OUTPATIENT
Start: 2019-12-28 | End: 2020-01-02

## 2019-12-28 RX ADMIN — ARIPIPRAZOLE 10 MG: 10 TABLET ORAL at 11:40

## 2019-12-28 RX ADMIN — TIOTROPIUM BROMIDE 18 MCG: 18 CAPSULE ORAL; RESPIRATORY (INHALATION) at 08:40

## 2019-12-28 RX ADMIN — BENZTROPINE MESYLATE 1 MG: 1 TABLET ORAL at 11:39

## 2019-12-28 RX ADMIN — BUSPIRONE HYDROCHLORIDE 30 MG: 5 TABLET ORAL at 11:41

## 2019-12-28 RX ADMIN — PANTOPRAZOLE SODIUM 40 MG: 40 TABLET, DELAYED RELEASE ORAL at 15:53

## 2019-12-28 RX ADMIN — ENOXAPARIN SODIUM 40 MG: 40 INJECTION SUBCUTANEOUS at 11:41

## 2019-12-28 RX ADMIN — ACETAMINOPHEN 650 MG: 325 TABLET ORAL at 07:12

## 2019-12-28 RX ADMIN — LEVOFLOXACIN 750 MG: 5 INJECTION, SOLUTION INTRAVENOUS at 01:42

## 2019-12-28 RX ADMIN — BUPRENORPHINE HYDROCHLORIDE, NALOXONE HYDROCHLORIDE 1 FILM: 8; 2 FILM, SOLUBLE BUCCAL; SUBLINGUAL at 09:10

## 2019-12-28 RX ADMIN — CLOPIDOGREL BISULFATE 75 MG: 75 TABLET ORAL at 11:40

## 2019-12-28 RX ADMIN — DIGOXIN 125 MCG: 125 TABLET ORAL at 11:39

## 2019-12-28 RX ADMIN — INSULIN LISPRO 3 UNITS: 100 INJECTION, SOLUTION INTRAVENOUS; SUBCUTANEOUS at 16:46

## 2019-12-28 RX ADMIN — INSULIN LISPRO 3 UNITS: 100 INJECTION, SOLUTION INTRAVENOUS; SUBCUTANEOUS at 11:54

## 2019-12-28 RX ADMIN — METRONIDAZOLE 500 MG: 500 INJECTION, SOLUTION INTRAVENOUS at 09:10

## 2019-12-28 RX ADMIN — METRONIDAZOLE 500 MG: 500 INJECTION, SOLUTION INTRAVENOUS at 01:42

## 2019-12-28 RX ADMIN — ASPIRIN 81 MG: 81 TABLET, COATED ORAL at 11:40

## 2019-12-28 RX ADMIN — METRONIDAZOLE 500 MG: 500 INJECTION, SOLUTION INTRAVENOUS at 15:53

## 2019-12-28 RX ADMIN — LAMOTRIGINE 150 MG: 100 TABLET ORAL at 11:40

## 2019-12-28 RX ADMIN — PANTOPRAZOLE SODIUM 40 MG: 40 TABLET, DELAYED RELEASE ORAL at 11:40

## 2019-12-28 ASSESSMENT — PAIN SCALES - GENERAL
PAINLEVEL_OUTOF10: 8
PAINLEVEL_OUTOF10: 6

## 2019-12-30 LAB
BLOOD CULTURE, ROUTINE: NORMAL
CULTURE, BLOOD 2: NORMAL

## 2020-01-01 ENCOUNTER — TELEPHONE (OUTPATIENT)
Dept: CARDIOLOGY CLINIC | Age: 55
End: 2020-01-01

## 2020-01-01 ENCOUNTER — OFFICE VISIT (OUTPATIENT)
Dept: CARDIOLOGY CLINIC | Age: 55
End: 2020-01-01
Payer: COMMERCIAL

## 2020-01-01 ENCOUNTER — HOSPITAL ENCOUNTER (OUTPATIENT)
Dept: GENERAL RADIOLOGY | Age: 55
Discharge: HOME | End: 2020-10-14
Payer: COMMERCIAL

## 2020-01-01 ENCOUNTER — APPOINTMENT (OUTPATIENT)
Dept: GENERAL RADIOLOGY | Age: 55
End: 2020-01-01
Payer: COMMERCIAL

## 2020-01-01 ENCOUNTER — HOSPITAL ENCOUNTER (OUTPATIENT)
Dept: WOUND CARE | Age: 55
Discharge: HOME | End: 2020-11-18
Payer: COMMERCIAL

## 2020-01-01 ENCOUNTER — APPOINTMENT (OUTPATIENT)
Dept: NUCLEAR MEDICINE | Age: 55
End: 2020-01-01
Payer: COMMERCIAL

## 2020-01-01 ENCOUNTER — HOSPITAL ENCOUNTER (OUTPATIENT)
Dept: GENERAL RADIOLOGY | Age: 55
Discharge: HOME | End: 2020-11-24
Payer: COMMERCIAL

## 2020-01-01 ENCOUNTER — HOSPITAL ENCOUNTER (OUTPATIENT)
Dept: WOUND CARE | Age: 55
Discharge: HOME | End: 2020-11-04
Payer: COMMERCIAL

## 2020-01-01 ENCOUNTER — HOSPITAL ENCOUNTER (INPATIENT)
Age: 55
LOS: 1 days | Discharge: HOME HEALTH | DRG: 420 | End: 2020-12-22
Attending: INTERNAL MEDICINE | Admitting: HOSPITALIST
Payer: COMMERCIAL

## 2020-01-01 ENCOUNTER — HOSPITAL ENCOUNTER (OUTPATIENT)
Dept: WOUND CARE | Age: 55
Discharge: HOME | End: 2020-10-07
Payer: COMMERCIAL

## 2020-01-01 ENCOUNTER — HOSPITAL ENCOUNTER (OUTPATIENT)
Age: 55
End: 2020-01-01
Payer: COMMERCIAL

## 2020-01-01 ENCOUNTER — APPOINTMENT (OUTPATIENT)
Dept: CT IMAGING | Age: 55
End: 2020-01-01
Payer: COMMERCIAL

## 2020-01-01 ENCOUNTER — PROCEDURE VISIT (OUTPATIENT)
Dept: CARDIOLOGY CLINIC | Age: 55
End: 2020-01-01

## 2020-01-01 ENCOUNTER — HOSPITAL ENCOUNTER (OUTPATIENT)
Age: 55
Discharge: HOME | End: 2020-10-23
Payer: COMMERCIAL

## 2020-01-01 ENCOUNTER — HOSPITAL ENCOUNTER (OUTPATIENT)
Dept: WOUND CARE | Age: 55
Discharge: HOME | End: 2020-11-11
Payer: COMMERCIAL

## 2020-01-01 ENCOUNTER — HOSPITAL ENCOUNTER (OUTPATIENT)
Age: 55
Discharge: HOME | End: 2020-11-24
Payer: COMMERCIAL

## 2020-01-01 ENCOUNTER — HOSPITAL ENCOUNTER (OUTPATIENT)
Dept: WOUND CARE | Age: 55
Discharge: HOME | End: 2020-10-14
Payer: COMMERCIAL

## 2020-01-01 ENCOUNTER — HOSPITAL ENCOUNTER (EMERGENCY)
Age: 55
Discharge: OTHER INSTITUTION | End: 2020-12-21
Attending: EMERGENCY MEDICINE | Admitting: HOSPITALIST
Payer: COMMERCIAL

## 2020-01-01 ENCOUNTER — VIRTUAL VISIT (OUTPATIENT)
Dept: PULMONOLOGY | Age: 55
End: 2020-01-01
Payer: COMMERCIAL

## 2020-01-01 ENCOUNTER — HOSPITAL ENCOUNTER (OUTPATIENT)
Dept: WOUND CARE | Age: 55
Discharge: HOME | End: 2020-10-21
Payer: COMMERCIAL

## 2020-01-01 ENCOUNTER — TELEPHONE (OUTPATIENT)
Dept: PULMONOLOGY | Age: 55
End: 2020-01-01

## 2020-01-01 ENCOUNTER — HOSPITAL ENCOUNTER (OUTPATIENT)
Age: 55
Discharge: HOME | End: 2020-12-04
Payer: COMMERCIAL

## 2020-01-01 ENCOUNTER — HOSPITAL ENCOUNTER (OUTPATIENT)
Dept: WOUND CARE | Age: 55
Discharge: HOME | End: 2020-10-28
Payer: COMMERCIAL

## 2020-01-01 ENCOUNTER — PROCEDURE VISIT (OUTPATIENT)
Dept: CARDIOLOGY CLINIC | Age: 55
End: 2020-01-01
Payer: COMMERCIAL

## 2020-01-01 ENCOUNTER — HOSPITAL ENCOUNTER (OUTPATIENT)
Age: 55
Discharge: HOME | End: 2020-10-14
Payer: COMMERCIAL

## 2020-01-01 ENCOUNTER — HOSPITAL ENCOUNTER (OUTPATIENT)
Age: 55
Setting detail: OBSERVATION
Discharge: HOME | End: 2020-12-03
Attending: STUDENT IN AN ORGANIZED HEALTH CARE EDUCATION/TRAINING PROGRAM | Admitting: INTERNAL MEDICINE
Payer: COMMERCIAL

## 2020-01-01 ENCOUNTER — HOSPITAL ENCOUNTER (OUTPATIENT)
Age: 55
Discharge: HOME | End: 2020-11-06
Payer: COMMERCIAL

## 2020-01-01 ENCOUNTER — HOSPITAL ENCOUNTER (OUTPATIENT)
Age: 55
Discharge: HOME | End: 2020-11-16
Payer: COMMERCIAL

## 2020-01-01 ENCOUNTER — HOSPITAL ENCOUNTER (OUTPATIENT)
Dept: WOUND CARE | Age: 55
Discharge: HOME | End: 2020-09-30
Payer: COMMERCIAL

## 2020-01-01 ENCOUNTER — TELEPHONE (OUTPATIENT)
Dept: ENDOCRINOLOGY | Age: 55
End: 2020-01-01

## 2020-01-01 VITALS
SYSTOLIC BLOOD PRESSURE: 130 MMHG | TEMPERATURE: 96.4 F | BODY MASS INDEX: 35.51 KG/M2 | DIASTOLIC BLOOD PRESSURE: 80 MMHG | WEIGHT: 208 LBS | HEART RATE: 110 BPM | HEIGHT: 64 IN | OXYGEN SATURATION: 97 %

## 2020-01-01 VITALS
BODY MASS INDEX: 38.52 KG/M2 | SYSTOLIC BLOOD PRESSURE: 121 MMHG | WEIGHT: 231.2 LBS | HEIGHT: 65 IN | TEMPERATURE: 97.8 F | DIASTOLIC BLOOD PRESSURE: 80 MMHG | RESPIRATION RATE: 22 BRPM | HEART RATE: 102 BPM

## 2020-01-01 VITALS
BODY MASS INDEX: 40.29 KG/M2 | OXYGEN SATURATION: 96 % | DIASTOLIC BLOOD PRESSURE: 82 MMHG | HEART RATE: 112 BPM | HEIGHT: 64 IN | SYSTOLIC BLOOD PRESSURE: 130 MMHG | WEIGHT: 236 LBS

## 2020-01-01 VITALS
RESPIRATION RATE: 18 BRPM | TEMPERATURE: 96.9 F | BODY MASS INDEX: 37.52 KG/M2 | SYSTOLIC BLOOD PRESSURE: 123 MMHG | DIASTOLIC BLOOD PRESSURE: 78 MMHG | HEIGHT: 64 IN | WEIGHT: 219.8 LBS | HEART RATE: 100 BPM

## 2020-01-01 VITALS
SYSTOLIC BLOOD PRESSURE: 105 MMHG | HEIGHT: 65 IN | BODY MASS INDEX: 36.35 KG/M2 | HEART RATE: 109 BPM | WEIGHT: 218.2 LBS | TEMPERATURE: 97.7 F | OXYGEN SATURATION: 94 % | RESPIRATION RATE: 18 BRPM | DIASTOLIC BLOOD PRESSURE: 66 MMHG

## 2020-01-01 VITALS
DIASTOLIC BLOOD PRESSURE: 86 MMHG | OXYGEN SATURATION: 97 % | WEIGHT: 212 LBS | BODY MASS INDEX: 36.19 KG/M2 | HEIGHT: 64 IN | RESPIRATION RATE: 20 BRPM | HEART RATE: 102 BPM | SYSTOLIC BLOOD PRESSURE: 132 MMHG | TEMPERATURE: 97.6 F

## 2020-01-01 VITALS
RESPIRATION RATE: 20 BRPM | BODY MASS INDEX: 38.48 KG/M2 | TEMPERATURE: 97 F | WEIGHT: 225.4 LBS | HEART RATE: 115 BPM | HEIGHT: 64 IN | SYSTOLIC BLOOD PRESSURE: 136 MMHG | DIASTOLIC BLOOD PRESSURE: 80 MMHG

## 2020-01-01 VITALS
HEART RATE: 117 BPM | TEMPERATURE: 97.9 F | OXYGEN SATURATION: 94 % | RESPIRATION RATE: 16 BRPM | SYSTOLIC BLOOD PRESSURE: 121 MMHG | BODY MASS INDEX: 36.88 KG/M2 | WEIGHT: 216 LBS | HEIGHT: 64 IN | DIASTOLIC BLOOD PRESSURE: 75 MMHG

## 2020-01-01 VITALS
WEIGHT: 229.6 LBS | DIASTOLIC BLOOD PRESSURE: 81 MMHG | HEART RATE: 113 BPM | SYSTOLIC BLOOD PRESSURE: 135 MMHG | RESPIRATION RATE: 20 BRPM | TEMPERATURE: 98.1 F | HEIGHT: 64 IN | BODY MASS INDEX: 39.2 KG/M2

## 2020-01-01 VITALS
HEIGHT: 64 IN | RESPIRATION RATE: 18 BRPM | HEART RATE: 77 BPM | TEMPERATURE: 97 F | DIASTOLIC BLOOD PRESSURE: 70 MMHG | WEIGHT: 227 LBS | SYSTOLIC BLOOD PRESSURE: 99 MMHG | BODY MASS INDEX: 38.76 KG/M2

## 2020-01-01 VITALS
TEMPERATURE: 98 F | HEIGHT: 64 IN | OXYGEN SATURATION: 97 % | RESPIRATION RATE: 24 BRPM | WEIGHT: 206.57 LBS | HEART RATE: 109 BPM | BODY MASS INDEX: 35.27 KG/M2 | SYSTOLIC BLOOD PRESSURE: 113 MMHG | DIASTOLIC BLOOD PRESSURE: 69 MMHG

## 2020-01-01 LAB
A/G RATIO: 0.8 (ref 1.1–2.2)
A/G RATIO: 1 (ref 1.1–2.2)
A/G RATIO: 1.1 (ref 1.1–2.2)
A/G RATIO: 1.2 (ref 1.1–2.2)
A/G RATIO: 1.2 (ref 1.1–2.2)
AFP: 1.6 UG/L
ALBUMIN SERPL-MCNC: 3.3 G/DL (ref 3.4–5)
ALBUMIN SERPL-MCNC: 3.5 G/DL (ref 3.4–5)
ALBUMIN SERPL-MCNC: 3.7 G/DL (ref 3.4–5)
ALBUMIN SERPL-MCNC: 3.7 G/DL (ref 3.4–5)
ALBUMIN SERPL-MCNC: 4.1 G/DL (ref 3.4–5)
ALBUMIN SERPL-MCNC: 4.2 G/DL (ref 3.4–5)
ALP BLD-CCNC: 151 U/L (ref 40–129)
ALP BLD-CCNC: 159 U/L (ref 40–129)
ALP BLD-CCNC: 213 U/L (ref 40–129)
ALP BLD-CCNC: 222 U/L (ref 40–129)
ALP BLD-CCNC: 252 U/L (ref 40–129)
ALPHA-1 ANTITRYPSIN PHENOTYPE: ABNORMAL
ALPHA-1 ANTITRYPSIN: 258 MG/DL (ref 90–200)
ALT SERPL-CCNC: 10 U/L (ref 10–40)
ALT SERPL-CCNC: 13 U/L (ref 10–40)
ALT SERPL-CCNC: 14 U/L (ref 10–40)
ALT SERPL-CCNC: 15 U/L (ref 10–40)
ALT SERPL-CCNC: 17 U/L (ref 10–40)
AMPHETAMINE SCREEN, URINE: NORMAL
ANA INTERPRETATION: ABNORMAL
ANA PATTERN: ABNORMAL
ANA TITER: ABNORMAL
ANION GAP SERPL CALCULATED.3IONS-SCNC: 10 MMOL/L (ref 3–16)
ANION GAP SERPL CALCULATED.3IONS-SCNC: 11 MMOL/L (ref 3–16)
ANION GAP SERPL CALCULATED.3IONS-SCNC: 11 MMOL/L (ref 3–16)
ANION GAP SERPL CALCULATED.3IONS-SCNC: 12 MMOL/L (ref 3–16)
ANION GAP SERPL CALCULATED.3IONS-SCNC: 13 MMOL/L (ref 3–16)
ANION GAP SERPL CALCULATED.3IONS-SCNC: 14 MMOL/L (ref 3–16)
ANION GAP SERPL CALCULATED.3IONS-SCNC: 14 MMOL/L (ref 3–16)
ANION GAP SERPL CALCULATED.3IONS-SCNC: 15 MMOL/L (ref 3–16)
ANION GAP SERPL CALCULATED.3IONS-SCNC: 19 MMOL/L (ref 3–16)
ANION GAP SERPL CALCULATED.3IONS-SCNC: 6 MMOL/L (ref 3–16)
ANION GAP SERPL CALCULATED.3IONS-SCNC: 8 MMOL/L (ref 3–16)
ANION GAP SERPL CALCULATED.3IONS-SCNC: 8 MMOL/L (ref 3–16)
ANTI-NUCLEAR ANTIBODY (ANA): POSITIVE
APTT: 25.8 SEC (ref 24.2–36.2)
APTT: 26.7 SEC (ref 24.2–36.2)
AST SERPL-CCNC: 16 U/L (ref 15–37)
AST SERPL-CCNC: 18 U/L (ref 15–37)
AST SERPL-CCNC: 18 U/L (ref 15–37)
AST SERPL-CCNC: 19 U/L (ref 15–37)
AST SERPL-CCNC: 23 U/L (ref 15–37)
B-TYPE NATRIURETIC PEPTIDE: 412.9 PG/ML
BACTERIA: ABNORMAL /HPF
BARBITURATE SCREEN URINE: NORMAL
BASE EXCESS VENOUS: 11.7 MMOL/L (ref -3–3)
BASOPHILS ABSOLUTE: 0 K/UL (ref 0–0.2)
BASOPHILS ABSOLUTE: 0.1 K/UL (ref 0–0.2)
BASOPHILS ABSOLUTE: 0.1 K/UL (ref 0–0.2)
BASOPHILS ABSOLUTE: ABNORMAL
BASOPHILS RELATIVE PERCENT: 0.2 %
BASOPHILS RELATIVE PERCENT: 0.2 %
BASOPHILS RELATIVE PERCENT: 0.3 %
BASOPHILS RELATIVE PERCENT: 0.4 %
BASOPHILS RELATIVE PERCENT: 0.4 %
BASOPHILS RELATIVE PERCENT: 1.1 %
BASOPHILS RELATIVE PERCENT: ABNORMAL
BENZODIAZEPINE SCREEN, URINE: NORMAL
BETA-HYDROXYBUTYRATE: 0.5 MMOL/L (ref 0–0.27)
BILIRUB SERPL-MCNC: 1.1 MG/DL (ref 0–1)
BILIRUB SERPL-MCNC: 1.3 MG/DL (ref 0–1)
BILIRUB SERPL-MCNC: 1.3 MG/DL (ref 0–1)
BILIRUB SERPL-MCNC: 1.5 MG/DL (ref 0–1)
BILIRUB SERPL-MCNC: 1.7 MG/DL (ref 0–1)
BILIRUBIN URINE: NEGATIVE
BILIRUBIN URINE: NEGATIVE
BLOOD, URINE: NEGATIVE
BLOOD, URINE: NEGATIVE
BUN BLDV-MCNC: 33 MG/DL (ref 7–20)
BUN BLDV-MCNC: 39 MG/DL (ref 7–20)
BUN BLDV-MCNC: 43 MG/DL (ref 7–20)
BUN BLDV-MCNC: 47 MG/DL (ref 7–20)
BUN BLDV-MCNC: 48 MG/DL (ref 7–20)
BUN BLDV-MCNC: 53 MG/DL
BUN BLDV-MCNC: 53 MG/DL (ref 7–20)
BUN BLDV-MCNC: 55 MG/DL (ref 7–20)
BUN BLDV-MCNC: 58 MG/DL (ref 7–20)
BUN BLDV-MCNC: 61 MG/DL (ref 7–20)
BUN BLDV-MCNC: 63 MG/DL (ref 7–20)
BUN BLDV-MCNC: 63 MG/DL (ref 7–20)
BUN BLDV-MCNC: 64 MG/DL (ref 7–20)
BUN BLDV-MCNC: 66 MG/DL (ref 7–20)
BUN BLDV-MCNC: 70 MG/DL (ref 7–20)
BUN BLDV-MCNC: 70 MG/DL (ref 7–20)
BUN BLDV-MCNC: 72 MG/DL (ref 7–20)
BUN BLDV-MCNC: 72 MG/DL (ref 7–20)
BUN BLDV-MCNC: 88 MG/DL (ref 7–20)
CALCIUM SERPL-MCNC: 10 MG/DL (ref 8.3–10.6)
CALCIUM SERPL-MCNC: 10.1 MG/DL (ref 8.3–10.6)
CALCIUM SERPL-MCNC: 8.4 MG/DL (ref 8.3–10.6)
CALCIUM SERPL-MCNC: 8.5 MG/DL (ref 8.3–10.6)
CALCIUM SERPL-MCNC: 8.5 MG/DL (ref 8.3–10.6)
CALCIUM SERPL-MCNC: 8.8 MG/DL (ref 8.3–10.6)
CALCIUM SERPL-MCNC: 8.8 MG/DL (ref 8.3–10.6)
CALCIUM SERPL-MCNC: 8.9 MG/DL (ref 8.3–10.6)
CALCIUM SERPL-MCNC: 9.1 MG/DL
CALCIUM SERPL-MCNC: 9.1 MG/DL (ref 8.3–10.6)
CALCIUM SERPL-MCNC: 9.3 MG/DL (ref 8.3–10.6)
CALCIUM SERPL-MCNC: 9.3 MG/DL (ref 8.3–10.6)
CALCIUM SERPL-MCNC: 9.4 MG/DL (ref 8.3–10.6)
CALCIUM SERPL-MCNC: 9.5 MG/DL (ref 8.3–10.6)
CALCIUM SERPL-MCNC: 9.7 MG/DL (ref 8.3–10.6)
CALCIUM SERPL-MCNC: 9.7 MG/DL (ref 8.3–10.6)
CALCIUM SERPL-MCNC: 9.8 MG/DL (ref 8.3–10.6)
CANNABINOID SCREEN URINE: NORMAL
CARBOXYHEMOGLOBIN: 4.1 % (ref 0–1.5)
CHLORIDE BLD-SCNC: 63 MMOL/L (ref 99–110)
CHLORIDE BLD-SCNC: 76 MMOL/L (ref 99–110)
CHLORIDE BLD-SCNC: 77 MMOL/L (ref 99–110)
CHLORIDE BLD-SCNC: 80 MMOL/L (ref 99–110)
CHLORIDE BLD-SCNC: 81 MMOL/L (ref 99–110)
CHLORIDE BLD-SCNC: 82 MMOL/L (ref 99–110)
CHLORIDE BLD-SCNC: 84 MMOL/L (ref 99–110)
CHLORIDE BLD-SCNC: 85 MMOL/L (ref 99–110)
CHLORIDE BLD-SCNC: 85 MMOL/L (ref 99–110)
CHLORIDE BLD-SCNC: 86 MMOL/L (ref 99–110)
CHLORIDE BLD-SCNC: 86 MMOL/L (ref 99–110)
CHLORIDE BLD-SCNC: 87 MMOL/L (ref 99–110)
CHLORIDE BLD-SCNC: 88 MMOL/L (ref 99–110)
CHLORIDE BLD-SCNC: 88 MMOL/L (ref 99–110)
CHLORIDE BLD-SCNC: 89 MMOL/L (ref 99–110)
CHLORIDE BLD-SCNC: 92 MMOL/L
CHLORIDE BLD-SCNC: 92 MMOL/L (ref 99–110)
CLARITY: CLEAR
CLARITY: CLEAR
CO2: 24 MMOL/L
CO2: 29 MMOL/L (ref 21–32)
CO2: 32 MMOL/L (ref 21–32)
CO2: 32 MMOL/L (ref 21–32)
CO2: 33 MMOL/L (ref 21–32)
CO2: 33 MMOL/L (ref 21–32)
CO2: 34 MMOL/L (ref 21–32)
CO2: 34 MMOL/L (ref 21–32)
CO2: 35 MMOL/L (ref 21–32)
CO2: 36 MMOL/L (ref 21–32)
CO2: 37 MMOL/L (ref 21–32)
CO2: 38 MMOL/L (ref 21–32)
CO2: 40 MMOL/L (ref 21–32)
CO2: 40 MMOL/L (ref 21–32)
COCAINE METABOLITE SCREEN URINE: NORMAL
COLOR: YELLOW
COLOR: YELLOW
CREAT SERPL-MCNC: 1.1 MG/DL (ref 0.6–1.1)
CREAT SERPL-MCNC: 1.2 MG/DL (ref 0.6–1.1)
CREAT SERPL-MCNC: 1.3 MG/DL (ref 0.6–1.1)
CREAT SERPL-MCNC: 1.3 MG/DL (ref 0.6–1.1)
CREAT SERPL-MCNC: 1.4 MG/DL (ref 0.6–1.1)
CREAT SERPL-MCNC: 1.5 MG/DL (ref 0.6–1.1)
CREAT SERPL-MCNC: 1.56 MG/DL
CREAT SERPL-MCNC: 1.6 MG/DL (ref 0.6–1.1)
CREAT SERPL-MCNC: 1.7 MG/DL (ref 0.6–1.1)
CREAT SERPL-MCNC: 1.7 MG/DL (ref 0.6–1.1)
CREAT SERPL-MCNC: 1.8 MG/DL (ref 0.6–1.1)
CREAT SERPL-MCNC: 1.9 MG/DL (ref 0.6–1.1)
CREAT SERPL-MCNC: 2.1 MG/DL (ref 0.6–1.1)
CREAT SERPL-MCNC: 2.4 MG/DL (ref 0.6–1.1)
CREAT SERPL-MCNC: 2.5 MG/DL (ref 0.6–1.1)
CREAT SERPL-MCNC: 2.8 MG/DL (ref 0.6–1.1)
CREAT SERPL-MCNC: 3 MG/DL (ref 0.6–1.1)
D DIMER: 432 NG/ML DDU (ref 0–229)
EKG ATRIAL RATE: 104 BPM
EKG ATRIAL RATE: 234 BPM
EKG DIAGNOSIS: NORMAL
EKG DIAGNOSIS: NORMAL
EKG P AXIS: 72 DEGREES
EKG P AXIS: 78 DEGREES
EKG P-R INTERVAL: 162 MS
EKG Q-T INTERVAL: 400 MS
EKG Q-T INTERVAL: 496 MS
EKG QRS DURATION: 148 MS
EKG QRS DURATION: 162 MS
EKG QTC CALCULATION (BAZETT): 526 MS
EKG QTC CALCULATION (BAZETT): 667 MS
EKG R AXIS: -68 DEGREES
EKG R AXIS: -72 DEGREES
EKG T AXIS: 72 DEGREES
EKG T AXIS: 72 DEGREES
EKG VENTRICULAR RATE: 104 BPM
EKG VENTRICULAR RATE: 109 BPM
EOSINOPHILS ABSOLUTE: 0 K/UL (ref 0–0.6)
EOSINOPHILS ABSOLUTE: 0 K/UL (ref 0–0.6)
EOSINOPHILS ABSOLUTE: 0.1 K/UL (ref 0–0.6)
EOSINOPHILS ABSOLUTE: 0.2 K/UL (ref 0–0.6)
EOSINOPHILS ABSOLUTE: ABNORMAL
EOSINOPHILS RELATIVE PERCENT: 0.1 %
EOSINOPHILS RELATIVE PERCENT: 0.2 %
EOSINOPHILS RELATIVE PERCENT: 0.4 %
EOSINOPHILS RELATIVE PERCENT: 0.5 %
EOSINOPHILS RELATIVE PERCENT: 1.3 %
EOSINOPHILS RELATIVE PERCENT: 1.3 %
EOSINOPHILS RELATIVE PERCENT: 1.4 %
EOSINOPHILS RELATIVE PERCENT: 1.7 %
EOSINOPHILS RELATIVE PERCENT: ABNORMAL
EPITHELIAL CELLS, UA: ABNORMAL /HPF (ref 0–5)
ESTIMATED AVERAGE GLUCOSE: 283.4 MG/DL
F-ACTIN AB IGG: 16 UNITS (ref 0–19)
FERRITIN: 159.2 NG/ML (ref 15–150)
GAMMA GLUTAMYL TRANSFERASE: 100 U/L (ref 5–36)
GFR AFRICAN AMERICAN: 19
GFR AFRICAN AMERICAN: 21
GFR AFRICAN AMERICAN: 24
GFR AFRICAN AMERICAN: 25
GFR AFRICAN AMERICAN: 29
GFR AFRICAN AMERICAN: 33
GFR AFRICAN AMERICAN: 35
GFR AFRICAN AMERICAN: 37
GFR AFRICAN AMERICAN: 37
GFR AFRICAN AMERICAN: 40
GFR AFRICAN AMERICAN: 43
GFR AFRICAN AMERICAN: 47
GFR AFRICAN AMERICAN: 51
GFR AFRICAN AMERICAN: 51
GFR AFRICAN AMERICAN: 56
GFR AFRICAN AMERICAN: >60
GFR CALCULATED: NORMAL
GFR NON-AFRICAN AMERICAN: 16
GFR NON-AFRICAN AMERICAN: 17
GFR NON-AFRICAN AMERICAN: 20
GFR NON-AFRICAN AMERICAN: 21
GFR NON-AFRICAN AMERICAN: 24
GFR NON-AFRICAN AMERICAN: 27
GFR NON-AFRICAN AMERICAN: 29
GFR NON-AFRICAN AMERICAN: 31
GFR NON-AFRICAN AMERICAN: 31
GFR NON-AFRICAN AMERICAN: 33
GFR NON-AFRICAN AMERICAN: 36
GFR NON-AFRICAN AMERICAN: 39
GFR NON-AFRICAN AMERICAN: 42
GFR NON-AFRICAN AMERICAN: 42
GFR NON-AFRICAN AMERICAN: 46
GFR NON-AFRICAN AMERICAN: 51
GLOBULIN: 3 G/DL
GLOBULIN: 3.3 G/DL
GLOBULIN: 3.4 G/DL
GLOBULIN: 4 G/DL
GLOBULIN: 4.6 G/DL
GLUCOSE BLD-MCNC: 125 MG/DL (ref 70–99)
GLUCOSE BLD-MCNC: 131 MG/DL (ref 70–99)
GLUCOSE BLD-MCNC: 144 MG/DL (ref 70–99)
GLUCOSE BLD-MCNC: 146 MG/DL
GLUCOSE BLD-MCNC: 147 MG/DL (ref 70–99)
GLUCOSE BLD-MCNC: 148 MG/DL (ref 70–99)
GLUCOSE BLD-MCNC: 149 MG/DL (ref 70–99)
GLUCOSE BLD-MCNC: 175 MG/DL (ref 70–99)
GLUCOSE BLD-MCNC: 181 MG/DL (ref 70–99)
GLUCOSE BLD-MCNC: 183 MG/DL (ref 70–99)
GLUCOSE BLD-MCNC: 199 MG/DL (ref 70–99)
GLUCOSE BLD-MCNC: 202 MG/DL (ref 70–99)
GLUCOSE BLD-MCNC: 203 MG/DL (ref 70–99)
GLUCOSE BLD-MCNC: 204 MG/DL (ref 70–99)
GLUCOSE BLD-MCNC: 205 MG/DL (ref 70–99)
GLUCOSE BLD-MCNC: 224 MG/DL (ref 70–99)
GLUCOSE BLD-MCNC: 224 MG/DL (ref 70–99)
GLUCOSE BLD-MCNC: 230 MG/DL (ref 70–99)
GLUCOSE BLD-MCNC: 233 MG/DL (ref 70–99)
GLUCOSE BLD-MCNC: 235 MG/DL (ref 70–99)
GLUCOSE BLD-MCNC: 241 MG/DL (ref 70–99)
GLUCOSE BLD-MCNC: 252 MG/DL (ref 70–99)
GLUCOSE BLD-MCNC: 256 MG/DL (ref 70–99)
GLUCOSE BLD-MCNC: 257 MG/DL (ref 70–99)
GLUCOSE BLD-MCNC: 263 MG/DL (ref 70–99)
GLUCOSE BLD-MCNC: 267 MG/DL (ref 70–99)
GLUCOSE BLD-MCNC: 275 MG/DL (ref 70–99)
GLUCOSE BLD-MCNC: 321 MG/DL (ref 70–99)
GLUCOSE BLD-MCNC: 335 MG/DL (ref 70–99)
GLUCOSE BLD-MCNC: 351 MG/DL (ref 70–99)
GLUCOSE BLD-MCNC: 369 MG/DL (ref 70–99)
GLUCOSE BLD-MCNC: 371 MG/DL (ref 70–99)
GLUCOSE BLD-MCNC: 373 MG/DL (ref 70–99)
GLUCOSE BLD-MCNC: 452 MG/DL (ref 70–99)
GLUCOSE BLD-MCNC: 453 MG/DL (ref 70–99)
GLUCOSE BLD-MCNC: 476 MG/DL (ref 70–99)
GLUCOSE BLD-MCNC: 49 MG/DL (ref 70–99)
GLUCOSE BLD-MCNC: 513 MG/DL (ref 70–99)
GLUCOSE BLD-MCNC: 843 MG/DL (ref 70–99)
GLUCOSE URINE: >=1000 MG/DL
GLUCOSE URINE: NEGATIVE MG/DL
HBA1C MFR BLD: 11.5 %
HCO3 VENOUS: 38.9 MMOL/L (ref 23–29)
HCT VFR BLD CALC: 37.5 % (ref 36–46)
HCT VFR BLD CALC: 41.3 % (ref 36–48)
HCT VFR BLD CALC: 42.8 % (ref 36–48)
HCT VFR BLD CALC: 43.9 % (ref 36–48)
HCT VFR BLD CALC: 44.5 % (ref 36–48)
HCT VFR BLD CALC: 45 % (ref 36–48)
HCT VFR BLD CALC: 47.2 % (ref 36–48)
HCT VFR BLD CALC: 49.9 % (ref 36–48)
HCT VFR BLD CALC: 52.1 % (ref 36–48)
HCV QNT BY NAAT IU/ML: NOT DETECTED IU/ML
HCV QNT BY NAAT LOG IU/ML: NOT DETECTED LOG IU/ML
HEMOGLOBIN: 11.9 G/DL (ref 12–16)
HEMOGLOBIN: 13.5 G/DL (ref 12–16)
HEMOGLOBIN: 14 G/DL (ref 12–16)
HEMOGLOBIN: 14.5 G/DL (ref 12–16)
HEMOGLOBIN: 14.7 G/DL (ref 12–16)
HEMOGLOBIN: 14.9 G/DL (ref 12–16)
HEMOGLOBIN: 15.5 G/DL (ref 12–16)
HEMOGLOBIN: 16.8 G/DL (ref 12–16)
HEMOGLOBIN: 16.9 G/DL (ref 12–16)
HYALINE CASTS: ABNORMAL /LPF (ref 0–2)
INR BLD: 1.04 (ref 0.86–1.14)
INR BLD: 1.09 (ref 0.86–1.14)
INR BLD: 1.19 (ref 0.86–1.14)
INTERPRETATION: NOT DETECTED
IRON SATURATION: 17 % (ref 15–50)
IRON: 63 UG/DL (ref 37–145)
KETONES, URINE: NEGATIVE MG/DL
KETONES, URINE: NEGATIVE MG/DL
LACTIC ACID, SEPSIS: 1.2 MMOL/L (ref 0.4–1.9)
LEUKOCYTE ESTERASE, URINE: NEGATIVE
LEUKOCYTE ESTERASE, URINE: NEGATIVE
LIPASE: 28 U/L (ref 13–60)
LYMPHOCYTES ABSOLUTE: 0.6 K/UL (ref 1–5.1)
LYMPHOCYTES ABSOLUTE: 0.6 K/UL (ref 1–5.1)
LYMPHOCYTES ABSOLUTE: 0.7 K/UL (ref 1–5.1)
LYMPHOCYTES ABSOLUTE: 0.8 K/UL (ref 1–5.1)
LYMPHOCYTES ABSOLUTE: 0.9 K/UL (ref 1–5.1)
LYMPHOCYTES ABSOLUTE: 1 K/UL (ref 1–5.1)
LYMPHOCYTES ABSOLUTE: ABNORMAL
LYMPHOCYTES RELATIVE PERCENT: 4.2 %
LYMPHOCYTES RELATIVE PERCENT: 4.5 %
LYMPHOCYTES RELATIVE PERCENT: 4.6 %
LYMPHOCYTES RELATIVE PERCENT: 4.8 %
LYMPHOCYTES RELATIVE PERCENT: 7 %
LYMPHOCYTES RELATIVE PERCENT: 7.4 %
LYMPHOCYTES RELATIVE PERCENT: 7.6 %
LYMPHOCYTES RELATIVE PERCENT: 7.9 %
LYMPHOCYTES RELATIVE PERCENT: ABNORMAL
Lab: NORMAL
MAGNESIUM: 2 MG/DL (ref 1.8–2.4)
MAGNESIUM: 2.2 MG/DL (ref 1.8–2.4)
MAGNESIUM: 2.2 MG/DL (ref 1.8–2.4)
MAGNESIUM: 2.3 MG/DL (ref 1.8–2.4)
MAGNESIUM: 2.5 MG/DL (ref 1.8–2.4)
MCH RBC QN AUTO: 27.9 PG
MCH RBC QN AUTO: 28.4 PG (ref 26–34)
MCH RBC QN AUTO: 28.6 PG (ref 26–34)
MCH RBC QN AUTO: 28.8 PG (ref 26–34)
MCH RBC QN AUTO: 28.9 PG (ref 26–34)
MCH RBC QN AUTO: 29.1 PG (ref 26–34)
MCHC RBC AUTO-ENTMCNC: 31.9 G/DL
MCHC RBC AUTO-ENTMCNC: 32.5 G/DL (ref 31–36)
MCHC RBC AUTO-ENTMCNC: 32.5 G/DL (ref 31–36)
MCHC RBC AUTO-ENTMCNC: 32.6 G/DL (ref 31–36)
MCHC RBC AUTO-ENTMCNC: 32.8 G/DL (ref 31–36)
MCHC RBC AUTO-ENTMCNC: 32.8 G/DL (ref 31–36)
MCHC RBC AUTO-ENTMCNC: 33.1 G/DL (ref 31–36)
MCHC RBC AUTO-ENTMCNC: 33.6 G/DL (ref 31–36)
MCHC RBC AUTO-ENTMCNC: 33.6 G/DL (ref 31–36)
MCV RBC AUTO: 85.9 FL (ref 80–100)
MCV RBC AUTO: 86 FL (ref 80–100)
MCV RBC AUTO: 87 FL (ref 80–100)
MCV RBC AUTO: 87.8 FL (ref 80–100)
MCV RBC AUTO: 87.8 FL (ref 80–100)
MCV RBC AUTO: 87.9 FL (ref 80–100)
MCV RBC AUTO: 88 FL
MCV RBC AUTO: 88.1 FL (ref 80–100)
MCV RBC AUTO: 89 FL (ref 80–100)
METHADONE SCREEN, URINE: NORMAL
METHEMOGLOBIN VENOUS: 0.3 %
MICROSCOPIC EXAMINATION: ABNORMAL
MICROSCOPIC EXAMINATION: YES
MITOCHONDRIAL M2 AB, IGG: 2.6 UNITS (ref 0–24.9)
MONOCYTES ABSOLUTE: 0.5 K/UL (ref 0–1.3)
MONOCYTES ABSOLUTE: 0.6 K/UL (ref 0–1.3)
MONOCYTES ABSOLUTE: 0.7 K/UL (ref 0–1.3)
MONOCYTES ABSOLUTE: 0.8 K/UL (ref 0–1.3)
MONOCYTES ABSOLUTE: 0.8 K/UL (ref 0–1.3)
MONOCYTES ABSOLUTE: 0.9 K/UL (ref 0–1.3)
MONOCYTES ABSOLUTE: ABNORMAL
MONOCYTES RELATIVE PERCENT: 4.1 %
MONOCYTES RELATIVE PERCENT: 4.4 %
MONOCYTES RELATIVE PERCENT: 4.9 %
MONOCYTES RELATIVE PERCENT: 4.9 %
MONOCYTES RELATIVE PERCENT: 6.2 %
MONOCYTES RELATIVE PERCENT: 6.6 %
MONOCYTES RELATIVE PERCENT: 7 %
MONOCYTES RELATIVE PERCENT: 7.9 %
MONOCYTES RELATIVE PERCENT: ABNORMAL
NEUTROPHILS ABSOLUTE: 11.2 K/UL (ref 1.7–7.7)
NEUTROPHILS ABSOLUTE: 11.5 K/UL (ref 1.7–7.7)
NEUTROPHILS ABSOLUTE: 12.6 K/UL (ref 1.7–7.7)
NEUTROPHILS ABSOLUTE: 13 K/UL (ref 1.7–7.7)
NEUTROPHILS ABSOLUTE: 14 K/UL (ref 1.7–7.7)
NEUTROPHILS ABSOLUTE: 6.2 K/UL (ref 1.7–7.7)
NEUTROPHILS ABSOLUTE: 9.1 K/UL (ref 1.7–7.7)
NEUTROPHILS ABSOLUTE: 9.5 K/UL (ref 1.7–7.7)
NEUTROPHILS ABSOLUTE: ABNORMAL
NEUTROPHILS RELATIVE PERCENT: 83 %
NEUTROPHILS RELATIVE PERCENT: 83.2 %
NEUTROPHILS RELATIVE PERCENT: 83.5 %
NEUTROPHILS RELATIVE PERCENT: 85 %
NEUTROPHILS RELATIVE PERCENT: 90 %
NEUTROPHILS RELATIVE PERCENT: 90 %
NEUTROPHILS RELATIVE PERCENT: 90.2 %
NEUTROPHILS RELATIVE PERCENT: 91 %
NEUTROPHILS RELATIVE PERCENT: ABNORMAL
NITRITE, URINE: NEGATIVE
NITRITE, URINE: NEGATIVE
O2 CONTENT, VEN: 23 VOL %
O2 SAT, VEN: 96 %
O2 THERAPY: ABNORMAL
OPIATE SCREEN URINE: NORMAL
OXYCODONE URINE: NORMAL
PATHOLOGIST: ABNORMAL
PCO2, VEN: 57.7 MMHG (ref 40–50)
PDW BLD-RTO: 15.5 % (ref 12.4–15.4)
PDW BLD-RTO: 15.5 % (ref 12.4–15.4)
PDW BLD-RTO: 15.6 % (ref 12.4–15.4)
PDW BLD-RTO: 15.6 % (ref 12.4–15.4)
PDW BLD-RTO: 15.8 % (ref 12.4–15.4)
PDW BLD-RTO: 15.8 % (ref 12.4–15.4)
PDW BLD-RTO: 15.9 % (ref 12.4–15.4)
PDW BLD-RTO: 19.6 % (ref 12.4–15.4)
PERFORMED ON: ABNORMAL
PH UA: 5 (ref 5–8)
PH UA: 6 (ref 5–8)
PH UA: 7
PH VENOUS: 7.45 (ref 7.35–7.45)
PHENCYCLIDINE SCREEN URINE: NORMAL
PHOSPHORUS: 3.1 MG/DL (ref 2.5–4.9)
PHOSPHORUS: 3.1 MG/DL (ref 2.5–4.9)
PLATELET # BLD: 182 K/UL (ref 135–450)
PLATELET # BLD: 189 K/UL (ref 135–450)
PLATELET # BLD: 197 K/UL (ref 135–450)
PLATELET # BLD: 197 K/UL (ref 135–450)
PLATELET # BLD: 215 K/UL (ref 135–450)
PLATELET # BLD: 222 K/UL (ref 135–450)
PLATELET # BLD: 273 K/UL (ref 135–450)
PLATELET # BLD: 311 K/UL (ref 135–450)
PLATELET # BLD: 327 K/ΜL
PMV BLD AUTO: 8.3 FL (ref 5–10.5)
PMV BLD AUTO: 8.4 FL (ref 5–10.5)
PMV BLD AUTO: 8.4 FL (ref 5–10.5)
PMV BLD AUTO: 8.5 FL (ref 5–10.5)
PMV BLD AUTO: 8.5 FL (ref 5–10.5)
PMV BLD AUTO: 8.7 FL (ref 5–10.5)
PMV BLD AUTO: 8.7 FL (ref 5–10.5)
PMV BLD AUTO: 9 FL (ref 5–10.5)
PMV BLD AUTO: ABNORMAL FL
PO2, VEN: 79.5 MMHG (ref 25–40)
POTASSIUM REFLEX MAGNESIUM: 2.3 MMOL/L (ref 3.5–5.1)
POTASSIUM REFLEX MAGNESIUM: 2.7 MMOL/L (ref 3.5–5.1)
POTASSIUM REFLEX MAGNESIUM: 2.9 MMOL/L (ref 3.5–5.1)
POTASSIUM REFLEX MAGNESIUM: 2.9 MMOL/L (ref 3.5–5.1)
POTASSIUM REFLEX MAGNESIUM: 3.6 MMOL/L (ref 3.5–5.1)
POTASSIUM SERPL-SCNC: 2.3 MMOL/L (ref 3.5–5.1)
POTASSIUM SERPL-SCNC: 2.6 MMOL/L (ref 3.5–5.1)
POTASSIUM SERPL-SCNC: 2.7 MMOL/L (ref 3.5–5.1)
POTASSIUM SERPL-SCNC: 2.8 MMOL/L (ref 3.5–5.1)
POTASSIUM SERPL-SCNC: 2.9 MMOL/L (ref 3.5–5.1)
POTASSIUM SERPL-SCNC: 3.2 MMOL/L (ref 3.5–5.1)
POTASSIUM SERPL-SCNC: 3.3 MMOL/L (ref 3.5–5.1)
POTASSIUM SERPL-SCNC: 3.6 MMOL/L
POTASSIUM SERPL-SCNC: 3.6 MMOL/L (ref 3.5–5.1)
POTASSIUM SERPL-SCNC: 3.7 MMOL/L (ref 3.5–5.1)
POTASSIUM SERPL-SCNC: 3.7 MMOL/L (ref 3.5–5.1)
POTASSIUM SERPL-SCNC: 4.8 MMOL/L (ref 3.5–5.1)
PRO-BNP: 2366 PG/ML (ref 0–124)
PRO-BNP: 3586 PG/ML (ref 0–124)
PRO-BNP: 3983 PG/ML (ref 0–124)
PRO-BNP: 924 PG/ML (ref 0–124)
PROCALCITONIN: 0.18 NG/ML (ref 0–0.15)
PROCALCITONIN: 0.2 NG/ML (ref 0–0.15)
PROPOXYPHENE SCREEN: NORMAL
PROTEIN UA: ABNORMAL MG/DL
PROTEIN UA: NEGATIVE MG/DL
PROTHROMBIN TIME: 12.1 SEC (ref 10–13.2)
PROTHROMBIN TIME: 12.7 SEC (ref 10–13.2)
PROTHROMBIN TIME: 13.8 SEC (ref 10–13.2)
RBC # BLD: 4.27 10^6/ΜL
RBC # BLD: 4.75 M/UL (ref 4–5.2)
RBC # BLD: 4.87 M/UL (ref 4–5.2)
RBC # BLD: 5.06 M/UL (ref 4–5.2)
RBC # BLD: 5.1 M/UL (ref 4–5.2)
RBC # BLD: 5.13 M/UL (ref 4–5.2)
RBC # BLD: 5.36 M/UL (ref 4–5.2)
RBC # BLD: 5.8 M/UL (ref 4–5.2)
RBC # BLD: 5.85 M/UL (ref 4–5.2)
RBC UA: ABNORMAL /HPF (ref 0–4)
SARS-COV-2, NAAT: NOT DETECTED
SARS-COV-2: NOT DETECTED
SODIUM BLD-SCNC: 116 MMOL/L (ref 136–145)
SODIUM BLD-SCNC: 122 MMOL/L (ref 136–145)
SODIUM BLD-SCNC: 124 MMOL/L (ref 136–145)
SODIUM BLD-SCNC: 126 MMOL/L (ref 136–145)
SODIUM BLD-SCNC: 127 MMOL/L (ref 136–145)
SODIUM BLD-SCNC: 129 MMOL/L (ref 136–145)
SODIUM BLD-SCNC: 130 MMOL/L (ref 136–145)
SODIUM BLD-SCNC: 130 MMOL/L (ref 136–145)
SODIUM BLD-SCNC: 131 MMOL/L (ref 136–145)
SODIUM BLD-SCNC: 132 MMOL/L (ref 136–145)
SODIUM BLD-SCNC: 133 MMOL/L (ref 136–145)
SODIUM BLD-SCNC: 133 MMOL/L (ref 136–145)
SODIUM BLD-SCNC: 134 MMOL/L (ref 136–145)
SODIUM BLD-SCNC: 136 MMOL/L (ref 136–145)
SODIUM BLD-SCNC: 138 MMOL/L
SPECIFIC GRAVITY UA: 1.02 (ref 1–1.03)
SPECIFIC GRAVITY UA: <=1.005 (ref 1–1.03)
TCO2 CALC VENOUS: 41 MMOL/L
TOTAL IRON BINDING CAPACITY: 374 UG/DL (ref 260–445)
TOTAL PROTEIN: 6.5 G/DL (ref 6.4–8.2)
TOTAL PROTEIN: 6.6 G/DL (ref 6.4–8.2)
TOTAL PROTEIN: 7.5 G/DL (ref 6.4–8.2)
TOTAL PROTEIN: 8.2 G/DL (ref 6.4–8.2)
TOTAL PROTEIN: 8.3 G/DL (ref 6.4–8.2)
TROPONIN: 0.03 NG/ML
TROPONIN: 0.04 NG/ML
URINE CULTURE, ROUTINE: NORMAL
URINE REFLEX TO CULTURE: ABNORMAL
URINE TYPE: ABNORMAL
URINE TYPE: ABNORMAL
UROBILINOGEN, URINE: 0.2 E.U./DL
UROBILINOGEN, URINE: 0.2 E.U./DL
WBC # BLD: 11 K/UL (ref 4–11)
WBC # BLD: 11.4 K/UL (ref 4–11)
WBC # BLD: 12.4 K/UL (ref 4–11)
WBC # BLD: 13.6 K/UL (ref 4–11)
WBC # BLD: 14 K/UL (ref 4–11)
WBC # BLD: 14.5 K/UL (ref 4–11)
WBC # BLD: 15.4 K/UL (ref 4–11)
WBC # BLD: 7.4 K/UL (ref 4–11)
WBC # BLD: 7.9 10^3/ML
WBC UA: ABNORMAL /HPF (ref 0–5)

## 2020-01-01 PROCEDURE — 71045 X-RAY EXAM CHEST 1 VIEW: CPT

## 2020-01-01 PROCEDURE — 29581 APPL MULTLAYER CMPRN SYS LEG: CPT

## 2020-01-01 PROCEDURE — 99232 SBSQ HOSP IP/OBS MODERATE 35: CPT | Performed by: INTERNAL MEDICINE

## 2020-01-01 PROCEDURE — 99291 CRITICAL CARE FIRST HOUR: CPT

## 2020-01-01 PROCEDURE — 99443 PR PHYS/QHP TELEPHONE EVALUATION 21-30 MIN: CPT | Performed by: INTERNAL MEDICINE

## 2020-01-01 PROCEDURE — 97597 DBRDMT OPN WND 1ST 20 CM/<: CPT

## 2020-01-01 PROCEDURE — 83516 IMMUNOASSAY NONANTIBODY: CPT

## 2020-01-01 PROCEDURE — 93264 REM MNTR WRLS P-ART PRS SNR: CPT | Performed by: NURSE PRACTITIONER

## 2020-01-01 PROCEDURE — 6370000000 HC RX 637 (ALT 250 FOR IP): Performed by: HOSPITALIST

## 2020-01-01 PROCEDURE — 81003 URINALYSIS AUTO W/O SCOPE: CPT

## 2020-01-01 PROCEDURE — 2580000003 HC RX 258: Performed by: INTERNAL MEDICINE

## 2020-01-01 PROCEDURE — G0378 HOSPITAL OBSERVATION PER HR: HCPCS

## 2020-01-01 PROCEDURE — 2022F DILAT RTA XM EVC RTNOPTHY: CPT | Performed by: NURSE PRACTITIONER

## 2020-01-01 PROCEDURE — 82105 ALPHA-FETOPROTEIN SERUM: CPT

## 2020-01-01 PROCEDURE — 36415 COLL VENOUS BLD VENIPUNCTURE: CPT

## 2020-01-01 PROCEDURE — 6370000000 HC RX 637 (ALT 250 FOR IP): Performed by: INTERNAL MEDICINE

## 2020-01-01 PROCEDURE — A9540 TC99M MAA: HCPCS | Performed by: STUDENT IN AN ORGANIZED HEALTH CARE EDUCATION/TRAINING PROGRAM

## 2020-01-01 PROCEDURE — A9558 XE133 XENON 10MCI: HCPCS | Performed by: STUDENT IN AN ORGANIZED HEALTH CARE EDUCATION/TRAINING PROGRAM

## 2020-01-01 PROCEDURE — 97116 GAIT TRAINING THERAPY: CPT

## 2020-01-01 PROCEDURE — 29581 APPL MULTLAYER CMPRN SYS LEG: CPT | Performed by: INTERNAL MEDICINE

## 2020-01-01 PROCEDURE — 97597 DBRDMT OPN WND 1ST 20 CM/<: CPT | Performed by: INTERNAL MEDICINE

## 2020-01-01 PROCEDURE — 85025 COMPLETE CBC W/AUTO DIFF WBC: CPT

## 2020-01-01 PROCEDURE — 94761 N-INVAS EAR/PLS OXIMETRY MLT: CPT

## 2020-01-01 PROCEDURE — 6360000002 HC RX W HCPCS: Performed by: INTERNAL MEDICINE

## 2020-01-01 PROCEDURE — 80053 COMPREHEN METABOLIC PANEL: CPT

## 2020-01-01 PROCEDURE — 84484 ASSAY OF TROPONIN QUANT: CPT

## 2020-01-01 PROCEDURE — 82947 ASSAY GLUCOSE BLOOD QUANT: CPT

## 2020-01-01 PROCEDURE — 83605 ASSAY OF LACTIC ACID: CPT

## 2020-01-01 PROCEDURE — 82977 ASSAY OF GGT: CPT

## 2020-01-01 PROCEDURE — G8484 FLU IMMUNIZE NO ADMIN: HCPCS | Performed by: NURSE PRACTITIONER

## 2020-01-01 PROCEDURE — 97598 DBRDMT OPN WND ADDL 20CM/<: CPT

## 2020-01-01 PROCEDURE — 96375 TX/PRO/DX INJ NEW DRUG ADDON: CPT

## 2020-01-01 PROCEDURE — 80048 BASIC METABOLIC PNL TOTAL CA: CPT

## 2020-01-01 PROCEDURE — 99214 OFFICE O/P EST MOD 30 MIN: CPT | Performed by: NURSE PRACTITIONER

## 2020-01-01 PROCEDURE — 2580000003 HC RX 258: Performed by: HOSPITALIST

## 2020-01-01 PROCEDURE — 96366 THER/PROPH/DIAG IV INF ADDON: CPT

## 2020-01-01 PROCEDURE — 96372 THER/PROPH/DIAG INJ SC/IM: CPT

## 2020-01-01 PROCEDURE — 6360000002 HC RX W HCPCS: Performed by: EMERGENCY MEDICINE

## 2020-01-01 PROCEDURE — 71046 X-RAY EXAM CHEST 2 VIEWS: CPT

## 2020-01-01 PROCEDURE — 85610 PROTHROMBIN TIME: CPT

## 2020-01-01 PROCEDURE — 83735 ASSAY OF MAGNESIUM: CPT

## 2020-01-01 PROCEDURE — 83880 ASSAY OF NATRIURETIC PEPTIDE: CPT

## 2020-01-01 PROCEDURE — 82103 ALPHA-1-ANTITRYPSIN TOTAL: CPT

## 2020-01-01 PROCEDURE — 1200000000 HC SEMI PRIVATE

## 2020-01-01 PROCEDURE — 6360000002 HC RX W HCPCS: Performed by: HOSPITALIST

## 2020-01-01 PROCEDURE — 71250 CT THORAX DX C-: CPT

## 2020-01-01 PROCEDURE — G8427 DOCREV CUR MEDS BY ELIG CLIN: HCPCS | Performed by: NURSE PRACTITIONER

## 2020-01-01 PROCEDURE — 2000000000 HC ICU R&B

## 2020-01-01 PROCEDURE — 83550 IRON BINDING TEST: CPT

## 2020-01-01 PROCEDURE — 93005 ELECTROCARDIOGRAM TRACING: CPT | Performed by: EMERGENCY MEDICINE

## 2020-01-01 PROCEDURE — 82010 KETONE BODYS QUAN: CPT

## 2020-01-01 PROCEDURE — 2580000003 HC RX 258: Performed by: EMERGENCY MEDICINE

## 2020-01-01 PROCEDURE — 6360000002 HC RX W HCPCS: Performed by: STUDENT IN AN ORGANIZED HEALTH CARE EDUCATION/TRAINING PROGRAM

## 2020-01-01 PROCEDURE — 3017F COLORECTAL CA SCREEN DOC REV: CPT | Performed by: NURSE PRACTITIONER

## 2020-01-01 PROCEDURE — 87522 HEPATITIS C REVRS TRNSCRPJ: CPT

## 2020-01-01 PROCEDURE — 78582 LUNG VENTILAT&PERFUS IMAGING: CPT

## 2020-01-01 PROCEDURE — 85730 THROMBOPLASTIN TIME PARTIAL: CPT

## 2020-01-01 PROCEDURE — 96367 TX/PROPH/DG ADDL SEQ IV INF: CPT

## 2020-01-01 PROCEDURE — U0003 INFECTIOUS AGENT DETECTION BY NUCLEIC ACID (DNA OR RNA); SEVERE ACUTE RESPIRATORY SYNDROME CORONAVIRUS 2 (SARS-COV-2) (CORONAVIRUS DISEASE [COVID-19]), AMPLIFIED PROBE TECHNIQUE, MAKING USE OF HIGH THROUGHPUT TECHNOLOGIES AS DESCRIBED BY CMS-2020-01-R: HCPCS

## 2020-01-01 PROCEDURE — 81001 URINALYSIS AUTO W/SCOPE: CPT

## 2020-01-01 PROCEDURE — 4004F PT TOBACCO SCREEN RCVD TLK: CPT | Performed by: NURSE PRACTITIONER

## 2020-01-01 PROCEDURE — 6370000000 HC RX 637 (ALT 250 FOR IP): Performed by: EMERGENCY MEDICINE

## 2020-01-01 PROCEDURE — 97598 DBRDMT OPN WND ADDL 20CM/<: CPT | Performed by: INTERNAL MEDICINE

## 2020-01-01 PROCEDURE — 94640 AIRWAY INHALATION TREATMENT: CPT

## 2020-01-01 PROCEDURE — 96361 HYDRATE IV INFUSION ADD-ON: CPT

## 2020-01-01 PROCEDURE — 96374 THER/PROPH/DIAG INJ IV PUSH: CPT

## 2020-01-01 PROCEDURE — 86039 ANTINUCLEAR ANTIBODIES (ANA): CPT

## 2020-01-01 PROCEDURE — 80307 DRUG TEST PRSMV CHEM ANLYZR: CPT

## 2020-01-01 PROCEDURE — 96376 TX/PRO/DX INJ SAME DRUG ADON: CPT

## 2020-01-01 PROCEDURE — 3051F HG A1C>EQUAL 7.0%<8.0%: CPT | Performed by: NURSE PRACTITIONER

## 2020-01-01 PROCEDURE — 87086 URINE CULTURE/COLONY COUNT: CPT

## 2020-01-01 PROCEDURE — 83036 HEMOGLOBIN GLYCOSYLATED A1C: CPT

## 2020-01-01 PROCEDURE — 93010 ELECTROCARDIOGRAM REPORT: CPT | Performed by: INTERNAL MEDICINE

## 2020-01-01 PROCEDURE — 99285 EMERGENCY DEPT VISIT HI MDM: CPT

## 2020-01-01 PROCEDURE — 97161 PT EVAL LOW COMPLEX 20 MIN: CPT

## 2020-01-01 PROCEDURE — 82728 ASSAY OF FERRITIN: CPT

## 2020-01-01 PROCEDURE — 6370000000 HC RX 637 (ALT 250 FOR IP): Performed by: STUDENT IN AN ORGANIZED HEALTH CARE EDUCATION/TRAINING PROGRAM

## 2020-01-01 PROCEDURE — 82803 BLOOD GASES ANY COMBINATION: CPT

## 2020-01-01 PROCEDURE — 3430000000 HC RX DIAGNOSTIC RADIOPHARMACEUTICAL: Performed by: STUDENT IN AN ORGANIZED HEALTH CARE EDUCATION/TRAINING PROGRAM

## 2020-01-01 PROCEDURE — 99233 SBSQ HOSP IP/OBS HIGH 50: CPT | Performed by: INTERNAL MEDICINE

## 2020-01-01 PROCEDURE — 84100 ASSAY OF PHOSPHORUS: CPT

## 2020-01-01 PROCEDURE — 83540 ASSAY OF IRON: CPT

## 2020-01-01 PROCEDURE — 99214 OFFICE O/P EST MOD 30 MIN: CPT

## 2020-01-01 PROCEDURE — 93005 ELECTROCARDIOGRAM TRACING: CPT | Performed by: STUDENT IN AN ORGANIZED HEALTH CARE EDUCATION/TRAINING PROGRAM

## 2020-01-01 PROCEDURE — G8417 CALC BMI ABV UP PARAM F/U: HCPCS | Performed by: NURSE PRACTITIONER

## 2020-01-01 PROCEDURE — 73030 X-RAY EXAM OF SHOULDER: CPT

## 2020-01-01 PROCEDURE — 51702 INSERT TEMP BLADDER CATH: CPT

## 2020-01-01 PROCEDURE — 96365 THER/PROPH/DIAG IV INF INIT: CPT

## 2020-01-01 PROCEDURE — 82104 ALPHA-1-ANTITRYPSIN PHENO: CPT

## 2020-01-01 PROCEDURE — 96368 THER/DIAG CONCURRENT INF: CPT

## 2020-01-01 PROCEDURE — 97535 SELF CARE MNGMENT TRAINING: CPT

## 2020-01-01 PROCEDURE — 99239 HOSP IP/OBS DSCHRG MGMT >30: CPT | Performed by: INTERNAL MEDICINE

## 2020-01-01 PROCEDURE — 84145 PROCALCITONIN (PCT): CPT

## 2020-01-01 PROCEDURE — 97165 OT EVAL LOW COMPLEX 30 MIN: CPT

## 2020-01-01 PROCEDURE — 86038 ANTINUCLEAR ANTIBODIES: CPT

## 2020-01-01 PROCEDURE — 2580000003 HC RX 258: Performed by: STUDENT IN AN ORGANIZED HEALTH CARE EDUCATION/TRAINING PROGRAM

## 2020-01-01 PROCEDURE — 83690 ASSAY OF LIPASE: CPT

## 2020-01-01 PROCEDURE — 85379 FIBRIN DEGRADATION QUANT: CPT

## 2020-01-01 PROCEDURE — 84132 ASSAY OF SERUM POTASSIUM: CPT

## 2020-01-01 PROCEDURE — U0002 COVID-19 LAB TEST NON-CDC: HCPCS

## 2020-01-01 RX ORDER — DEXTROSE, SODIUM CHLORIDE, AND POTASSIUM CHLORIDE 5; .45; .15 G/100ML; G/100ML; G/100ML
INJECTION INTRAVENOUS CONTINUOUS PRN
Status: DISCONTINUED | OUTPATIENT
Start: 2020-01-01 | End: 2020-01-01 | Stop reason: HOSPADM

## 2020-01-01 RX ORDER — IPRATROPIUM BROMIDE AND ALBUTEROL SULFATE 2.5; .5 MG/3ML; MG/3ML
1 SOLUTION RESPIRATORY (INHALATION) 3 TIMES DAILY
Status: DISCONTINUED | OUTPATIENT
Start: 2020-01-01 | End: 2020-01-01

## 2020-01-01 RX ORDER — ACETAMINOPHEN 325 MG/1
650 TABLET ORAL EVERY 6 HOURS PRN
Status: DISCONTINUED | OUTPATIENT
Start: 2020-01-01 | End: 2020-01-01 | Stop reason: HOSPADM

## 2020-01-01 RX ORDER — BENZTROPINE MESYLATE 1 MG/1
1 TABLET ORAL NIGHTLY
Status: DISCONTINUED | OUTPATIENT
Start: 2020-01-01 | End: 2020-01-01 | Stop reason: HOSPADM

## 2020-01-01 RX ORDER — POTASSIUM CHLORIDE 7.45 MG/ML
10 INJECTION INTRAVENOUS
Status: DISCONTINUED | OUTPATIENT
Start: 2020-01-01 | End: 2020-01-01 | Stop reason: CLARIF

## 2020-01-01 RX ORDER — POTASSIUM CHLORIDE 20 MEQ/1
40 TABLET, EXTENDED RELEASE ORAL 2 TIMES DAILY WITH MEALS
Status: DISCONTINUED | OUTPATIENT
Start: 2020-01-01 | End: 2020-01-01

## 2020-01-01 RX ORDER — POTASSIUM CHLORIDE 7.45 MG/ML
10 INJECTION INTRAVENOUS PRN
Status: DISCONTINUED | OUTPATIENT
Start: 2020-01-01 | End: 2020-01-01 | Stop reason: HOSPADM

## 2020-01-01 RX ORDER — TRAZODONE HYDROCHLORIDE 100 MG/1
100 TABLET ORAL NIGHTLY PRN
Status: DISCONTINUED | OUTPATIENT
Start: 2020-01-01 | End: 2020-01-01 | Stop reason: HOSPADM

## 2020-01-01 RX ORDER — METOLAZONE 2.5 MG/1
2.5 TABLET ORAL
Status: ON HOLD | COMMUNITY
End: 2021-01-01 | Stop reason: SDUPTHER

## 2020-01-01 RX ORDER — ACETAMINOPHEN 650 MG/1
650 SUPPOSITORY RECTAL EVERY 6 HOURS PRN
Status: DISCONTINUED | OUTPATIENT
Start: 2020-01-01 | End: 2020-01-01

## 2020-01-01 RX ORDER — LIDOCAINE HYDROCHLORIDE 40 MG/ML
SOLUTION TOPICAL ONCE
Status: DISCONTINUED | OUTPATIENT
Start: 2020-01-01 | End: 2020-01-01 | Stop reason: HOSPADM

## 2020-01-01 RX ORDER — DOXYCYCLINE HYCLATE 100 MG/1
100 CAPSULE ORAL 2 TIMES DAILY
COMMUNITY
Start: 2020-01-01 | End: 2020-01-01

## 2020-01-01 RX ORDER — MAGNESIUM SULFATE 1 G/100ML
1 INJECTION INTRAVENOUS PRN
Status: DISCONTINUED | OUTPATIENT
Start: 2020-01-01 | End: 2020-01-01 | Stop reason: HOSPADM

## 2020-01-01 RX ORDER — SPIRONOLACTONE 25 MG/1
25 TABLET ORAL DAILY
Status: DISCONTINUED | OUTPATIENT
Start: 2020-01-01 | End: 2020-01-01 | Stop reason: HOSPADM

## 2020-01-01 RX ORDER — SODIUM CHLORIDE 9 MG/ML
INJECTION, SOLUTION INTRAVENOUS ONCE
Status: COMPLETED | OUTPATIENT
Start: 2020-01-01 | End: 2020-01-01

## 2020-01-01 RX ORDER — BUSPIRONE HYDROCHLORIDE 10 MG/1
30 TABLET ORAL 2 TIMES DAILY
Status: DISCONTINUED | OUTPATIENT
Start: 2020-01-01 | End: 2020-01-01

## 2020-01-01 RX ORDER — DEXTROSE MONOHYDRATE 25 G/50ML
12.5 INJECTION, SOLUTION INTRAVENOUS PRN
Status: DISCONTINUED | OUTPATIENT
Start: 2020-01-01 | End: 2020-01-01 | Stop reason: HOSPADM

## 2020-01-01 RX ORDER — 0.9 % SODIUM CHLORIDE 0.9 %
500 INTRAVENOUS SOLUTION INTRAVENOUS ONCE
Status: COMPLETED | OUTPATIENT
Start: 2020-01-01 | End: 2020-01-01

## 2020-01-01 RX ORDER — XENON XE-133 10 MCI/1
10.1 GAS RESPIRATORY (INHALATION)
Status: COMPLETED | OUTPATIENT
Start: 2020-01-01 | End: 2020-01-01

## 2020-01-01 RX ORDER — BLOOD-GLUCOSE METER
KIT MISCELLANEOUS
Qty: 200 STRIP | Refills: 10 | Status: SHIPPED | OUTPATIENT
Start: 2020-01-01 | End: 2020-01-01 | Stop reason: SDUPTHER

## 2020-01-01 RX ORDER — METOLAZONE 2.5 MG/1
2.5 TABLET ORAL
Qty: 30 TABLET | Refills: 0 | Status: ON HOLD
Start: 2020-01-01 | End: 2020-01-01 | Stop reason: HOSPADM

## 2020-01-01 RX ORDER — PANTOPRAZOLE SODIUM 40 MG/1
40 TABLET, DELAYED RELEASE ORAL 2 TIMES DAILY
Status: DISCONTINUED | OUTPATIENT
Start: 2020-01-01 | End: 2020-01-01 | Stop reason: HOSPADM

## 2020-01-01 RX ORDER — POTASSIUM CHLORIDE 750 MG/1
20 TABLET, EXTENDED RELEASE ORAL ONCE
Status: COMPLETED | OUTPATIENT
Start: 2020-01-01 | End: 2020-01-01

## 2020-01-01 RX ORDER — ACETAMINOPHEN 325 MG/1
650 TABLET ORAL EVERY 6 HOURS PRN
Status: DISCONTINUED | OUTPATIENT
Start: 2020-01-01 | End: 2020-01-01

## 2020-01-01 RX ORDER — CARVEDILOL 3.12 MG/1
3.12 TABLET ORAL 2 TIMES DAILY WITH MEALS
COMMUNITY
End: 2021-01-01

## 2020-01-01 RX ORDER — METHOCARBAMOL 500 MG/1
500 TABLET, FILM COATED ORAL 2 TIMES DAILY
Status: ON HOLD | COMMUNITY
End: 2021-01-01

## 2020-01-01 RX ORDER — ONDANSETRON 2 MG/ML
4 INJECTION INTRAMUSCULAR; INTRAVENOUS ONCE
Status: COMPLETED | OUTPATIENT
Start: 2020-01-01 | End: 2020-01-01

## 2020-01-01 RX ORDER — NICOTINE POLACRILEX 4 MG
15 LOZENGE BUCCAL PRN
Status: DISCONTINUED | OUTPATIENT
Start: 2020-01-01 | End: 2020-01-01 | Stop reason: HOSPADM

## 2020-01-01 RX ORDER — SODIUM CHLORIDE 9 MG/ML
INJECTION, SOLUTION INTRAVENOUS
Status: DISCONTINUED
Start: 2020-01-01 | End: 2020-01-01 | Stop reason: HOSPADM

## 2020-01-01 RX ORDER — POTASSIUM CHLORIDE 750 MG/1
40 TABLET, EXTENDED RELEASE ORAL 2 TIMES DAILY
Status: DISCONTINUED | OUTPATIENT
Start: 2020-01-01 | End: 2020-01-01 | Stop reason: HOSPADM

## 2020-01-01 RX ORDER — LAMOTRIGINE 100 MG/1
100 TABLET ORAL 2 TIMES DAILY
Status: DISCONTINUED | OUTPATIENT
Start: 2020-01-01 | End: 2020-01-01

## 2020-01-01 RX ORDER — MIDODRINE HYDROCHLORIDE 5 MG/1
5 TABLET ORAL 2 TIMES DAILY
Status: ON HOLD | COMMUNITY
End: 2021-01-01 | Stop reason: HOSPADM

## 2020-01-01 RX ORDER — SODIUM CHLORIDE 9 MG/ML
INJECTION, SOLUTION INTRAVENOUS CONTINUOUS
Status: DISCONTINUED | OUTPATIENT
Start: 2020-01-01 | End: 2020-01-01

## 2020-01-01 RX ORDER — SODIUM CHLORIDE 9 MG/ML
INJECTION, SOLUTION INTRAVENOUS CONTINUOUS
Status: DISCONTINUED | OUTPATIENT
Start: 2020-01-01 | End: 2020-01-01 | Stop reason: HOSPADM

## 2020-01-01 RX ORDER — POTASSIUM CHLORIDE 7.45 MG/ML
20 INJECTION INTRAVENOUS ONCE
Status: COMPLETED | OUTPATIENT
Start: 2020-01-01 | End: 2020-01-01

## 2020-01-01 RX ORDER — ATORVASTATIN CALCIUM 80 MG/1
80 TABLET, FILM COATED ORAL NIGHTLY
Status: DISCONTINUED | OUTPATIENT
Start: 2020-01-01 | End: 2020-01-01 | Stop reason: HOSPADM

## 2020-01-01 RX ORDER — TORSEMIDE 100 MG/1
50 TABLET ORAL DAILY
Qty: 90 TABLET | Refills: 1 | Status: ON HOLD
Start: 2020-01-01 | End: 2020-01-01 | Stop reason: CLARIF

## 2020-01-01 RX ORDER — CLOPIDOGREL BISULFATE 75 MG/1
75 TABLET ORAL DAILY
Status: DISCONTINUED | OUTPATIENT
Start: 2020-01-01 | End: 2020-01-01 | Stop reason: HOSPADM

## 2020-01-01 RX ORDER — POTASSIUM CHLORIDE 20 MEQ/1
40 TABLET, EXTENDED RELEASE ORAL ONCE
Status: COMPLETED | OUTPATIENT
Start: 2020-01-01 | End: 2020-01-01

## 2020-01-01 RX ORDER — ACETAMINOPHEN 650 MG/1
650 SUPPOSITORY RECTAL EVERY 6 HOURS PRN
Status: DISCONTINUED | OUTPATIENT
Start: 2020-01-01 | End: 2020-01-01 | Stop reason: HOSPADM

## 2020-01-01 RX ORDER — INSULIN GLARGINE 100 [IU]/ML
30 INJECTION, SOLUTION SUBCUTANEOUS DAILY
Status: DISCONTINUED | OUTPATIENT
Start: 2020-01-01 | End: 2020-01-01 | Stop reason: HOSPADM

## 2020-01-01 RX ORDER — ALBUTEROL SULFATE 2.5 MG/3ML
2.5 SOLUTION RESPIRATORY (INHALATION) EVERY 4 HOURS PRN
Status: DISCONTINUED | OUTPATIENT
Start: 2020-01-01 | End: 2020-01-01 | Stop reason: HOSPADM

## 2020-01-01 RX ORDER — ASPIRIN 81 MG/1
81 TABLET ORAL DAILY
Status: DISCONTINUED | OUTPATIENT
Start: 2020-01-01 | End: 2020-01-01 | Stop reason: HOSPADM

## 2020-01-01 RX ORDER — ONDANSETRON 2 MG/ML
4 INJECTION INTRAMUSCULAR; INTRAVENOUS EVERY 6 HOURS PRN
Status: DISCONTINUED | OUTPATIENT
Start: 2020-01-01 | End: 2020-01-01

## 2020-01-01 RX ORDER — 0.9 % SODIUM CHLORIDE 0.9 %
1000 INTRAVENOUS SOLUTION INTRAVENOUS ONCE
Status: DISCONTINUED | OUTPATIENT
Start: 2020-01-01 | End: 2020-01-01

## 2020-01-01 RX ORDER — TORSEMIDE 20 MG/1
20 TABLET ORAL DAILY
Status: DISCONTINUED | OUTPATIENT
Start: 2020-01-01 | End: 2020-01-01 | Stop reason: HOSPADM

## 2020-01-01 RX ORDER — SPIRONOLACTONE 50 MG/1
50 TABLET, FILM COATED ORAL DAILY
Status: ON HOLD | COMMUNITY
End: 2021-01-01 | Stop reason: HOSPADM

## 2020-01-01 RX ORDER — INSULIN GLARGINE 100 [IU]/ML
30 INJECTION, SOLUTION SUBCUTANEOUS ONCE
Status: COMPLETED | OUTPATIENT
Start: 2020-01-01 | End: 2020-01-01

## 2020-01-01 RX ORDER — MIDODRINE HYDROCHLORIDE 5 MG/1
10 TABLET ORAL
Status: DISCONTINUED | OUTPATIENT
Start: 2020-01-01 | End: 2020-01-01 | Stop reason: HOSPADM

## 2020-01-01 RX ORDER — DEXTROSE MONOHYDRATE 50 MG/ML
100 INJECTION, SOLUTION INTRAVENOUS PRN
Status: DISCONTINUED | OUTPATIENT
Start: 2020-01-01 | End: 2020-01-01 | Stop reason: HOSPADM

## 2020-01-01 RX ORDER — POTASSIUM CHLORIDE 20 MEQ/1
40 TABLET, EXTENDED RELEASE ORAL 4 TIMES DAILY
COMMUNITY
End: 2021-01-01

## 2020-01-01 RX ORDER — IPRATROPIUM BROMIDE AND ALBUTEROL SULFATE 2.5; .5 MG/3ML; MG/3ML
1 SOLUTION RESPIRATORY (INHALATION)
Status: DISCONTINUED | OUTPATIENT
Start: 2020-01-01 | End: 2020-01-01

## 2020-01-01 RX ORDER — LIDOCAINE 40 MG/G
CREAM TOPICAL ONCE
Status: DISCONTINUED | OUTPATIENT
Start: 2020-01-01 | End: 2020-01-01 | Stop reason: HOSPADM

## 2020-01-01 RX ORDER — ARIPIPRAZOLE 10 MG/1
10 TABLET ORAL DAILY
Status: DISCONTINUED | OUTPATIENT
Start: 2020-01-01 | End: 2020-01-01 | Stop reason: HOSPADM

## 2020-01-01 RX ORDER — POTASSIUM BICARBONATE 25 MEQ/1
50 TABLET, EFFERVESCENT ORAL
Status: COMPLETED | OUTPATIENT
Start: 2020-01-01 | End: 2020-01-01

## 2020-01-01 RX ORDER — PANTOPRAZOLE SODIUM 40 MG/1
40 TABLET, DELAYED RELEASE ORAL
Status: DISCONTINUED | OUTPATIENT
Start: 2020-01-01 | End: 2020-01-01 | Stop reason: HOSPADM

## 2020-01-01 RX ORDER — POLYETHYLENE GLYCOL 3350 17 G/17G
17 POWDER, FOR SOLUTION ORAL DAILY PRN
Status: DISCONTINUED | OUTPATIENT
Start: 2020-01-01 | End: 2020-01-01 | Stop reason: HOSPADM

## 2020-01-01 RX ORDER — SODIUM CHLORIDE 0.9 % (FLUSH) 0.9 %
10 SYRINGE (ML) INJECTION PRN
Status: DISCONTINUED | OUTPATIENT
Start: 2020-01-01 | End: 2020-01-01 | Stop reason: HOSPADM

## 2020-01-01 RX ORDER — INSULIN GLARGINE 100 [IU]/ML
10 INJECTION, SOLUTION SUBCUTANEOUS ONCE
Status: COMPLETED | OUTPATIENT
Start: 2020-01-01 | End: 2020-01-01

## 2020-01-01 RX ORDER — POTASSIUM CHLORIDE 20 MEQ/1
40 TABLET, EXTENDED RELEASE ORAL 2 TIMES DAILY
Status: DISPENSED | OUTPATIENT
Start: 2020-01-01 | End: 2020-01-01

## 2020-01-01 RX ORDER — POTASSIUM CHLORIDE 7.45 MG/ML
10 INJECTION INTRAVENOUS ONCE
Status: DISCONTINUED | OUTPATIENT
Start: 2020-01-01 | End: 2020-01-01

## 2020-01-01 RX ORDER — BUPRENORPHINE AND NALOXONE 8; 2 MG/1; MG/1
1 FILM, SOLUBLE BUCCAL; SUBLINGUAL 2 TIMES DAILY
Status: DISCONTINUED | OUTPATIENT
Start: 2020-01-01 | End: 2020-01-01 | Stop reason: HOSPADM

## 2020-01-01 RX ORDER — PROMETHAZINE HYDROCHLORIDE 25 MG/1
12.5 TABLET ORAL EVERY 6 HOURS PRN
Status: DISCONTINUED | OUTPATIENT
Start: 2020-01-01 | End: 2020-01-01

## 2020-01-01 RX ORDER — ONDANSETRON 2 MG/ML
4 INJECTION INTRAMUSCULAR; INTRAVENOUS EVERY 6 HOURS PRN
Status: DISCONTINUED | OUTPATIENT
Start: 2020-01-01 | End: 2020-01-01 | Stop reason: HOSPADM

## 2020-01-01 RX ORDER — SODIUM CHLORIDE 9 MG/ML
INJECTION, SOLUTION INTRAVENOUS
Status: DISPENSED
Start: 2020-01-01 | End: 2020-01-01

## 2020-01-01 RX ORDER — POTASSIUM CHLORIDE 20 MEQ/1
20 TABLET, EXTENDED RELEASE ORAL ONCE
Status: COMPLETED | OUTPATIENT
Start: 2020-01-01 | End: 2020-01-01

## 2020-01-01 RX ORDER — PROCHLORPERAZINE EDISYLATE 5 MG/ML
10 INJECTION INTRAMUSCULAR; INTRAVENOUS EVERY 6 HOURS PRN
Status: DISCONTINUED | OUTPATIENT
Start: 2020-01-01 | End: 2020-01-01 | Stop reason: HOSPADM

## 2020-01-01 RX ORDER — BUSPIRONE HYDROCHLORIDE 5 MG/1
30 TABLET ORAL 2 TIMES DAILY
Status: DISCONTINUED | OUTPATIENT
Start: 2020-01-01 | End: 2020-01-01 | Stop reason: HOSPADM

## 2020-01-01 RX ORDER — ATORVASTATIN CALCIUM 40 MG/1
80 TABLET, FILM COATED ORAL NIGHTLY
Status: DISCONTINUED | OUTPATIENT
Start: 2020-01-01 | End: 2020-01-01 | Stop reason: HOSPADM

## 2020-01-01 RX ORDER — METHYLPREDNISOLONE 4 MG/1
TABLET ORAL
Qty: 1 KIT | Refills: 0 | Status: SHIPPED | OUTPATIENT
Start: 2020-01-01 | End: 2020-01-01 | Stop reason: ALTCHOICE

## 2020-01-01 RX ORDER — NITROGLYCERIN 0.4 MG/1
0.4 TABLET SUBLINGUAL EVERY 5 MIN PRN
COMMUNITY
End: 2021-01-01 | Stop reason: SDUPTHER

## 2020-01-01 RX ORDER — PROCHLORPERAZINE EDISYLATE 5 MG/ML
5 INJECTION INTRAMUSCULAR; INTRAVENOUS EVERY 6 HOURS PRN
Status: DISCONTINUED | OUTPATIENT
Start: 2020-01-01 | End: 2020-01-01 | Stop reason: HOSPADM

## 2020-01-01 RX ORDER — SODIUM CHLORIDE 0.9 % (FLUSH) 0.9 %
10 SYRINGE (ML) INJECTION EVERY 12 HOURS SCHEDULED
Status: DISCONTINUED | OUTPATIENT
Start: 2020-01-01 | End: 2020-01-01 | Stop reason: HOSPADM

## 2020-01-01 RX ORDER — TORSEMIDE 100 MG/1
100 TABLET ORAL DAILY
Qty: 90 TABLET | Refills: 1 | Status: ON HOLD | OUTPATIENT
Start: 2020-01-01 | End: 2020-01-01 | Stop reason: SDUPTHER

## 2020-01-01 RX ORDER — INSULIN GLARGINE 100 [IU]/ML
30 INJECTION, SOLUTION SUBCUTANEOUS NIGHTLY
Status: DISCONTINUED | OUTPATIENT
Start: 2020-01-01 | End: 2020-01-01 | Stop reason: HOSPADM

## 2020-01-01 RX ORDER — INSULIN GLARGINE 100 [IU]/ML
30 INJECTION, SOLUTION SUBCUTANEOUS NIGHTLY
Status: DISCONTINUED | OUTPATIENT
Start: 2020-01-01 | End: 2020-01-01

## 2020-01-01 RX ORDER — LAMOTRIGINE 100 MG/1
200 TABLET ORAL 2 TIMES DAILY
Status: DISCONTINUED | OUTPATIENT
Start: 2020-01-01 | End: 2020-01-01 | Stop reason: HOSPADM

## 2020-01-01 RX ORDER — BLOOD-GLUCOSE METER
KIT MISCELLANEOUS
Qty: 200 STRIP | Refills: 10 | Status: SHIPPED | OUTPATIENT
Start: 2020-01-01

## 2020-01-01 RX ORDER — TRAZODONE HYDROCHLORIDE 50 MG/1
50 TABLET ORAL NIGHTLY PRN
Status: DISCONTINUED | OUTPATIENT
Start: 2020-01-01 | End: 2020-01-01

## 2020-01-01 RX ORDER — ALBUTEROL SULFATE 90 UG/1
2 AEROSOL, METERED RESPIRATORY (INHALATION) 2 TIMES DAILY
Status: DISCONTINUED | OUTPATIENT
Start: 2020-01-01 | End: 2020-01-01 | Stop reason: HOSPADM

## 2020-01-01 RX ORDER — METOLAZONE 2.5 MG/1
2.5 TABLET ORAL
COMMUNITY
Start: 2020-01-01 | End: 2020-01-01 | Stop reason: DRUGHIGH

## 2020-01-01 RX ORDER — 0.9 % SODIUM CHLORIDE 0.9 %
1000 INTRAVENOUS SOLUTION INTRAVENOUS ONCE
Status: COMPLETED | OUTPATIENT
Start: 2020-01-01 | End: 2020-01-01

## 2020-01-01 RX ORDER — DOXYCYCLINE HYCLATE 100 MG/1
100 CAPSULE ORAL 2 TIMES DAILY
Qty: 20 CAPSULE | Refills: 0 | Status: SHIPPED | OUTPATIENT
Start: 2020-01-01 | End: 2020-01-01 | Stop reason: ALTCHOICE

## 2020-01-01 RX ORDER — ARIPIPRAZOLE 10 MG/1
10 TABLET ORAL DAILY
Status: DISCONTINUED | OUTPATIENT
Start: 2020-01-01 | End: 2020-01-01

## 2020-01-01 RX ORDER — TORSEMIDE 20 MG/1
50 TABLET ORAL DAILY
Status: ON HOLD | COMMUNITY
End: 2021-01-01 | Stop reason: SDUPTHER

## 2020-01-01 RX ORDER — BUSPIRONE HYDROCHLORIDE 7.5 MG/1
15 TABLET ORAL 2 TIMES DAILY
Status: DISCONTINUED | OUTPATIENT
Start: 2020-01-01 | End: 2020-01-01 | Stop reason: HOSPADM

## 2020-01-01 RX ORDER — LAMOTRIGINE 100 MG/1
100 TABLET ORAL 2 TIMES DAILY
Status: DISCONTINUED | OUTPATIENT
Start: 2020-01-01 | End: 2020-01-01 | Stop reason: HOSPADM

## 2020-01-01 RX ORDER — IPRATROPIUM BROMIDE AND ALBUTEROL SULFATE 2.5; .5 MG/3ML; MG/3ML
1 SOLUTION RESPIRATORY (INHALATION) EVERY 4 HOURS PRN
Status: DISCONTINUED | OUTPATIENT
Start: 2020-01-01 | End: 2020-01-01 | Stop reason: HOSPADM

## 2020-01-01 RX ORDER — BLOOD-GLUCOSE METER
KIT MISCELLANEOUS
Qty: 1 KIT | Refills: 0 | Status: SHIPPED | OUTPATIENT
Start: 2020-01-01 | End: 2021-01-01

## 2020-01-01 RX ORDER — SACUBITRIL AND VALSARTAN 49; 51 MG/1; MG/1
TABLET, FILM COATED ORAL
Status: ON HOLD | COMMUNITY
End: 2020-01-01 | Stop reason: CLARIF

## 2020-01-01 RX ORDER — LAMOTRIGINE 100 MG/1
200 TABLET ORAL 2 TIMES DAILY
Status: DISCONTINUED | OUTPATIENT
Start: 2020-01-01 | End: 2020-01-01

## 2020-01-01 RX ORDER — MIDODRINE HYDROCHLORIDE 5 MG/1
10 TABLET ORAL ONCE
Status: COMPLETED | OUTPATIENT
Start: 2020-01-01 | End: 2020-01-01

## 2020-01-01 RX ORDER — POTASSIUM CHLORIDE 20 MEQ/1
20 TABLET, EXTENDED RELEASE ORAL 3 TIMES DAILY
Qty: 180 TABLET | Refills: 0 | Status: ON HOLD
Start: 2020-01-01 | End: 2020-01-01 | Stop reason: CLARIF

## 2020-01-01 RX ADMIN — BUSPIRONE HYDROCHLORIDE 30 MG: 5 TABLET ORAL at 10:14

## 2020-01-01 RX ADMIN — BUPRENORPHINE AND NALOXONE 1 FILM: 8; 2 FILM BUCCAL; SUBLINGUAL at 01:20

## 2020-01-01 RX ADMIN — POTASSIUM CHLORIDE 20 MEQ: 1500 TABLET, EXTENDED RELEASE ORAL at 10:46

## 2020-01-01 RX ADMIN — ENOXAPARIN SODIUM 40 MG: 40 INJECTION SUBCUTANEOUS at 09:52

## 2020-01-01 RX ADMIN — ENOXAPARIN SODIUM 30 MG: 30 INJECTION SUBCUTANEOUS at 21:30

## 2020-01-01 RX ADMIN — MUPIROCIN: 20 OINTMENT TOPICAL at 10:33

## 2020-01-01 RX ADMIN — SODIUM CHLORIDE: 9 INJECTION, SOLUTION INTRAVENOUS at 12:30

## 2020-01-01 RX ADMIN — POTASSIUM CHLORIDE 40 MEQ: 1500 TABLET, EXTENDED RELEASE ORAL at 01:20

## 2020-01-01 RX ADMIN — INSULIN LISPRO 9 UNITS: 100 INJECTION, SOLUTION INTRAVENOUS; SUBCUTANEOUS at 03:26

## 2020-01-01 RX ADMIN — POTASSIUM CHLORIDE 20 MEQ: 1500 TABLET, EXTENDED RELEASE ORAL at 12:40

## 2020-01-01 RX ADMIN — BENZTROPINE MESYLATE 1 MG: 1 TABLET ORAL at 21:30

## 2020-01-01 RX ADMIN — BUPRENORPHINE AND NALOXONE 1 FILM: 8; 2 FILM BUCCAL; SUBLINGUAL at 20:52

## 2020-01-01 RX ADMIN — INSULIN LISPRO 4 UNITS: 100 INJECTION, SOLUTION INTRAVENOUS; SUBCUTANEOUS at 14:54

## 2020-01-01 RX ADMIN — POTASSIUM CHLORIDE 40 MEQ: 1500 TABLET, EXTENDED RELEASE ORAL at 08:50

## 2020-01-01 RX ADMIN — POTASSIUM BICARBONATE 40 MEQ: 782 TABLET, EFFERVESCENT ORAL at 12:46

## 2020-01-01 RX ADMIN — SODIUM CHLORIDE 1000 ML: 9 INJECTION, SOLUTION INTRAVENOUS at 00:11

## 2020-01-01 RX ADMIN — ARIPIPRAZOLE 10 MG: 10 TABLET ORAL at 12:50

## 2020-01-01 RX ADMIN — CLOPIDOGREL BISULFATE 75 MG: 75 TABLET ORAL at 10:45

## 2020-01-01 RX ADMIN — LAMOTRIGINE 200 MG: 100 TABLET ORAL at 09:59

## 2020-01-01 RX ADMIN — DEXTROSE MONOHYDRATE 500 MG: 50 INJECTION, SOLUTION INTRAVENOUS at 13:06

## 2020-01-01 RX ADMIN — SODIUM CHLORIDE, PRESERVATIVE FREE 10 ML: 5 INJECTION INTRAVENOUS at 10:01

## 2020-01-01 RX ADMIN — PROCHLORPERAZINE EDISYLATE 10 MG: 5 INJECTION INTRAMUSCULAR; INTRAVENOUS at 06:49

## 2020-01-01 RX ADMIN — XENON XE-133 10.1 MILLICURIE: 10 GAS RESPIRATORY (INHALATION) at 16:40

## 2020-01-01 RX ADMIN — ATORVASTATIN CALCIUM 80 MG: 80 TABLET, FILM COATED ORAL at 21:30

## 2020-01-01 RX ADMIN — SODIUM CHLORIDE: 9 INJECTION, SOLUTION INTRAVENOUS at 23:59

## 2020-01-01 RX ADMIN — POTASSIUM CHLORIDE 20 MEQ: 750 TABLET, EXTENDED RELEASE ORAL at 00:12

## 2020-01-01 RX ADMIN — SODIUM CHLORIDE, PRESERVATIVE FREE 10 ML: 5 INJECTION INTRAVENOUS at 21:31

## 2020-01-01 RX ADMIN — PANTOPRAZOLE SODIUM 40 MG: 40 TABLET, DELAYED RELEASE ORAL at 08:52

## 2020-01-01 RX ADMIN — PROCHLORPERAZINE EDISYLATE 5 MG: 5 INJECTION INTRAMUSCULAR; INTRAVENOUS at 18:29

## 2020-01-01 RX ADMIN — Medication 10 ML: at 22:06

## 2020-01-01 RX ADMIN — BUSPIRONE HYDROCHLORIDE 30 MG: 5 TABLET ORAL at 21:15

## 2020-01-01 RX ADMIN — BUPRENORPHINE AND NALOXONE 1 FILM: 8; 2 FILM BUCCAL; SUBLINGUAL at 10:49

## 2020-01-01 RX ADMIN — INSULIN GLARGINE 30 UNITS: 100 INJECTION, SOLUTION SUBCUTANEOUS at 10:01

## 2020-01-01 RX ADMIN — POTASSIUM CHLORIDE 10 MEQ: 7.46 INJECTION, SOLUTION INTRAVENOUS at 02:31

## 2020-01-01 RX ADMIN — ARIPIPRAZOLE 10 MG: 10 TABLET ORAL at 09:08

## 2020-01-01 RX ADMIN — MIDODRINE HYDROCHLORIDE 10 MG: 5 TABLET ORAL at 09:09

## 2020-01-01 RX ADMIN — MIDODRINE HYDROCHLORIDE 10 MG: 5 TABLET ORAL at 18:00

## 2020-01-01 RX ADMIN — ASPIRIN 81 MG: 81 TABLET, COATED ORAL at 09:52

## 2020-01-01 RX ADMIN — MIDODRINE HYDROCHLORIDE 10 MG: 5 TABLET ORAL at 16:42

## 2020-01-01 RX ADMIN — BENZTROPINE MESYLATE 1 MG: 1 TABLET ORAL at 20:53

## 2020-01-01 RX ADMIN — Medication 10 ML: at 09:53

## 2020-01-01 RX ADMIN — CLOPIDOGREL BISULFATE 75 MG: 75 TABLET, FILM COATED ORAL at 09:59

## 2020-01-01 RX ADMIN — INSULIN LISPRO 4 UNITS: 100 INJECTION, SOLUTION INTRAVENOUS; SUBCUTANEOUS at 13:21

## 2020-01-01 RX ADMIN — POTASSIUM CHLORIDE 40 MEQ: 1500 TABLET, EXTENDED RELEASE ORAL at 10:45

## 2020-01-01 RX ADMIN — ENOXAPARIN SODIUM 30 MG: 30 INJECTION SUBCUTANEOUS at 12:50

## 2020-01-01 RX ADMIN — SODIUM CHLORIDE: 9 INJECTION, SOLUTION INTRAVENOUS at 04:59

## 2020-01-01 RX ADMIN — MIDODRINE HYDROCHLORIDE 10 MG: 5 TABLET ORAL at 10:45

## 2020-01-01 RX ADMIN — INSULIN LISPRO 4 UNITS: 100 INJECTION, SOLUTION INTRAVENOUS; SUBCUTANEOUS at 18:30

## 2020-01-01 RX ADMIN — PANTOPRAZOLE SODIUM 40 MG: 40 TABLET, DELAYED RELEASE ORAL at 16:56

## 2020-01-01 RX ADMIN — SODIUM CHLORIDE 0.1 UNITS/KG/HR: 9 INJECTION, SOLUTION INTRAVENOUS at 04:55

## 2020-01-01 RX ADMIN — SODIUM CHLORIDE: 9 INJECTION, SOLUTION INTRAVENOUS at 17:00

## 2020-01-01 RX ADMIN — BUPRENORPHINE AND NALOXONE 1 FILM: 8; 2 FILM BUCCAL; SUBLINGUAL at 09:52

## 2020-01-01 RX ADMIN — ARIPIPRAZOLE 10 MG: 10 TABLET ORAL at 09:52

## 2020-01-01 RX ADMIN — ATORVASTATIN CALCIUM 80 MG: 40 TABLET, FILM COATED ORAL at 20:53

## 2020-01-01 RX ADMIN — MIDODRINE HYDROCHLORIDE 10 MG: 5 TABLET ORAL at 12:40

## 2020-01-01 RX ADMIN — BUSPIRONE HYDROCHLORIDE 30 MG: 10 TABLET ORAL at 01:20

## 2020-01-01 RX ADMIN — IPRATROPIUM BROMIDE AND ALBUTEROL SULFATE 1 AMPULE: 2.5; .5 SOLUTION RESPIRATORY (INHALATION) at 06:54

## 2020-01-01 RX ADMIN — CLOPIDOGREL BISULFATE 75 MG: 75 TABLET, FILM COATED ORAL at 16:42

## 2020-01-01 RX ADMIN — TORSEMIDE 20 MG: 20 TABLET ORAL at 10:46

## 2020-01-01 RX ADMIN — ACETAMINOPHEN 650 MG: 325 TABLET ORAL at 01:16

## 2020-01-01 RX ADMIN — INSULIN GLARGINE 30 UNITS: 100 INJECTION, SOLUTION SUBCUTANEOUS at 03:27

## 2020-01-01 RX ADMIN — PANTOPRAZOLE SODIUM 40 MG: 40 TABLET, DELAYED RELEASE ORAL at 16:58

## 2020-01-01 RX ADMIN — PANTOPRAZOLE SODIUM 40 MG: 40 TABLET, DELAYED RELEASE ORAL at 10:00

## 2020-01-01 RX ADMIN — LAMOTRIGINE 100 MG: 100 TABLET ORAL at 09:52

## 2020-01-01 RX ADMIN — BUSPIRONE HYDROCHLORIDE 15 MG: 7.5 TABLET ORAL at 08:51

## 2020-01-01 RX ADMIN — LAMOTRIGINE 200 MG: 100 TABLET ORAL at 09:07

## 2020-01-01 RX ADMIN — MIDODRINE HYDROCHLORIDE 10 MG: 5 TABLET ORAL at 09:52

## 2020-01-01 RX ADMIN — ASPIRIN 81 MG: 81 TABLET, COATED ORAL at 10:45

## 2020-01-01 RX ADMIN — TRAZODONE HYDROCHLORIDE 100 MG: 100 TABLET ORAL at 01:20

## 2020-01-01 RX ADMIN — SODIUM CHLORIDE 500 ML: 9 INJECTION, SOLUTION INTRAVENOUS at 18:17

## 2020-01-01 RX ADMIN — INSULIN LISPRO 6 UNITS: 100 INJECTION, SOLUTION INTRAVENOUS; SUBCUTANEOUS at 10:09

## 2020-01-01 RX ADMIN — ARIPIPRAZOLE 10 MG: 10 TABLET ORAL at 10:00

## 2020-01-01 RX ADMIN — CLOPIDOGREL BISULFATE 75 MG: 75 TABLET ORAL at 09:08

## 2020-01-01 RX ADMIN — PROCHLORPERAZINE EDISYLATE 10 MG: 5 INJECTION INTRAMUSCULAR; INTRAVENOUS at 00:38

## 2020-01-01 RX ADMIN — ENOXAPARIN SODIUM 30 MG: 30 INJECTION SUBCUTANEOUS at 10:01

## 2020-01-01 RX ADMIN — BUSPIRONE HYDROCHLORIDE 15 MG: 7.5 TABLET ORAL at 10:45

## 2020-01-01 RX ADMIN — SODIUM CHLORIDE: 9 INJECTION, SOLUTION INTRAVENOUS at 06:51

## 2020-01-01 RX ADMIN — MIDODRINE HYDROCHLORIDE 10 MG: 5 TABLET ORAL at 16:56

## 2020-01-01 RX ADMIN — INSULIN GLARGINE 10 UNITS: 100 INJECTION, SOLUTION SUBCUTANEOUS at 09:13

## 2020-01-01 RX ADMIN — IPRATROPIUM BROMIDE AND ALBUTEROL SULFATE 1 AMPULE: 2.5; .5 SOLUTION RESPIRATORY (INHALATION) at 19:08

## 2020-01-01 RX ADMIN — Medication 10 ML: at 09:14

## 2020-01-01 RX ADMIN — PANTOPRAZOLE SODIUM 40 MG: 40 TABLET, DELAYED RELEASE ORAL at 06:09

## 2020-01-01 RX ADMIN — MIDODRINE HYDROCHLORIDE 10 MG: 5 TABLET ORAL at 10:13

## 2020-01-01 RX ADMIN — ASPIRIN 81 MG: 81 TABLET, COATED ORAL at 09:59

## 2020-01-01 RX ADMIN — MIDODRINE HYDROCHLORIDE 10 MG: 5 TABLET ORAL at 12:50

## 2020-01-01 RX ADMIN — IPRATROPIUM BROMIDE AND ALBUTEROL SULFATE 1 AMPULE: 2.5; .5 SOLUTION RESPIRATORY (INHALATION) at 19:22

## 2020-01-01 RX ADMIN — ASPIRIN 81 MG: 81 TABLET, COATED ORAL at 08:51

## 2020-01-01 RX ADMIN — MIDODRINE HYDROCHLORIDE 10 MG: 5 TABLET ORAL at 12:46

## 2020-01-01 RX ADMIN — IPRATROPIUM BROMIDE AND ALBUTEROL SULFATE 1 AMPULE: 2.5; .5 SOLUTION RESPIRATORY (INHALATION) at 07:02

## 2020-01-01 RX ADMIN — IPRATROPIUM BROMIDE AND ALBUTEROL SULFATE 1 AMPULE: 2.5; .5 SOLUTION RESPIRATORY (INHALATION) at 06:50

## 2020-01-01 RX ADMIN — BUSPIRONE HYDROCHLORIDE 15 MG: 7.5 TABLET ORAL at 09:52

## 2020-01-01 RX ADMIN — IPRATROPIUM BROMIDE AND ALBUTEROL SULFATE 1 AMPULE: 2.5; .5 SOLUTION RESPIRATORY (INHALATION) at 13:05

## 2020-01-01 RX ADMIN — MIDODRINE HYDROCHLORIDE 10 MG: 5 TABLET ORAL at 16:58

## 2020-01-01 RX ADMIN — POTASSIUM CHLORIDE 40 MEQ: 750 TABLET, EXTENDED RELEASE ORAL at 08:33

## 2020-01-01 RX ADMIN — POTASSIUM CHLORIDE 10 MEQ: 7.46 INJECTION, SOLUTION INTRAVENOUS at 08:30

## 2020-01-01 RX ADMIN — INSULIN GLARGINE 30 UNITS: 100 INJECTION, SOLUTION SUBCUTANEOUS at 14:24

## 2020-01-01 RX ADMIN — SODIUM CHLORIDE 500 ML: 9 INJECTION, SOLUTION INTRAVENOUS at 12:02

## 2020-01-01 RX ADMIN — Medication 3.1 MILLICURIE: at 16:46

## 2020-01-01 RX ADMIN — ACETAMINOPHEN 650 MG: 325 TABLET ORAL at 08:52

## 2020-01-01 RX ADMIN — SODIUM CHLORIDE: 9 INJECTION, SOLUTION INTRAVENOUS at 22:06

## 2020-01-01 RX ADMIN — MUPIROCIN: 20 OINTMENT TOPICAL at 21:31

## 2020-01-01 RX ADMIN — POTASSIUM CHLORIDE: 2 INJECTION, SOLUTION, CONCENTRATE INTRAVENOUS at 02:28

## 2020-01-01 RX ADMIN — BENZTROPINE MESYLATE 1 MG: 1 TABLET ORAL at 01:20

## 2020-01-01 RX ADMIN — ONDANSETRON HYDROCHLORIDE 4 MG: 2 INJECTION, SOLUTION INTRAMUSCULAR; INTRAVENOUS at 22:42

## 2020-01-01 RX ADMIN — SODIUM CHLORIDE 500 ML: 9 INJECTION, SOLUTION INTRAVENOUS at 10:29

## 2020-01-01 RX ADMIN — BUSPIRONE HYDROCHLORIDE 15 MG: 7.5 TABLET ORAL at 20:53

## 2020-01-01 RX ADMIN — SPIRONOLACTONE 25 MG: 25 TABLET ORAL at 10:46

## 2020-01-01 RX ADMIN — POTASSIUM CHLORIDE 40 MEQ: 750 TABLET, EXTENDED RELEASE ORAL at 00:13

## 2020-01-01 RX ADMIN — BUPRENORPHINE AND NALOXONE 1 FILM: 8; 2 FILM BUCCAL; SUBLINGUAL at 09:09

## 2020-01-01 RX ADMIN — IPRATROPIUM BROMIDE AND ALBUTEROL SULFATE 1 AMPULE: 2.5; .5 SOLUTION RESPIRATORY (INHALATION) at 15:08

## 2020-01-01 RX ADMIN — POTASSIUM CHLORIDE: 2 INJECTION, SOLUTION, CONCENTRATE INTRAVENOUS at 10:30

## 2020-01-01 RX ADMIN — SODIUM CHLORIDE: 9 INJECTION, SOLUTION INTRAVENOUS at 13:14

## 2020-01-01 RX ADMIN — POTASSIUM BICARBONATE 40 MEQ: 782 TABLET, EFFERVESCENT ORAL at 13:00

## 2020-01-01 RX ADMIN — ATORVASTATIN CALCIUM 80 MG: 40 TABLET, FILM COATED ORAL at 01:20

## 2020-01-01 RX ADMIN — POTASSIUM BICARBONATE 50 MEQ: 977.5 TABLET, EFFERVESCENT ORAL at 15:53

## 2020-01-01 RX ADMIN — CLOPIDOGREL BISULFATE 75 MG: 75 TABLET ORAL at 09:52

## 2020-01-01 RX ADMIN — POTASSIUM BICARBONATE 50 MEQ: 977.5 TABLET, EFFERVESCENT ORAL at 18:29

## 2020-01-01 RX ADMIN — LAMOTRIGINE 200 MG: 100 TABLET ORAL at 01:20

## 2020-01-01 RX ADMIN — POTASSIUM CHLORIDE: 2 INJECTION, SOLUTION, CONCENTRATE INTRAVENOUS at 19:14

## 2020-01-01 RX ADMIN — LAMOTRIGINE 100 MG: 100 TABLET ORAL at 10:47

## 2020-01-01 RX ADMIN — CEFTRIAXONE SODIUM 1 G: 1 INJECTION, POWDER, FOR SOLUTION INTRAMUSCULAR; INTRAVENOUS at 12:30

## 2020-01-01 RX ADMIN — SODIUM CHLORIDE: 9 INJECTION, SOLUTION INTRAVENOUS at 14:19

## 2020-01-01 RX ADMIN — ENOXAPARIN SODIUM 40 MG: 40 INJECTION SUBCUTANEOUS at 09:51

## 2020-01-01 RX ADMIN — ENOXAPARIN SODIUM 40 MG: 40 INJECTION SUBCUTANEOUS at 09:08

## 2020-01-01 RX ADMIN — IPRATROPIUM BROMIDE AND ALBUTEROL SULFATE 1 AMPULE: .5; 3 SOLUTION RESPIRATORY (INHALATION) at 02:56

## 2020-01-01 RX ADMIN — MUPIROCIN: 20 OINTMENT TOPICAL at 12:51

## 2020-01-01 RX ADMIN — LAMOTRIGINE 100 MG: 100 TABLET ORAL at 20:53

## 2020-01-01 RX ADMIN — ASPIRIN 81 MG: 81 TABLET, COATED ORAL at 12:50

## 2020-01-01 RX ADMIN — LAMOTRIGINE 200 MG: 100 TABLET ORAL at 21:30

## 2020-01-01 RX ADMIN — PANTOPRAZOLE SODIUM 40 MG: 40 TABLET, DELAYED RELEASE ORAL at 16:42

## 2020-01-01 RX ADMIN — SODIUM CHLORIDE 1000 ML: 9 INJECTION, SOLUTION INTRAVENOUS at 22:42

## 2020-01-01 RX ADMIN — POTASSIUM CHLORIDE 40 MEQ: 1500 TABLET, EXTENDED RELEASE ORAL at 09:52

## 2020-01-01 RX ADMIN — SACUBITRIL AND VALSARTAN 1 TABLET: 24; 26 TABLET, FILM COATED ORAL at 01:20

## 2020-01-01 RX ADMIN — ONDANSETRON HYDROCHLORIDE 4 MG: 2 INJECTION, SOLUTION INTRAMUSCULAR; INTRAVENOUS at 13:48

## 2020-01-01 RX ADMIN — PROCHLORPERAZINE EDISYLATE 5 MG: 5 INJECTION INTRAMUSCULAR; INTRAVENOUS at 04:27

## 2020-01-01 ASSESSMENT — PAIN DESCRIPTION - PAIN TYPE
TYPE: CHRONIC PAIN
TYPE: ACUTE PAIN
TYPE: ACUTE PAIN
TYPE: CHRONIC PAIN
TYPE: ACUTE PAIN
TYPE: CHRONIC PAIN
TYPE: ACUTE PAIN

## 2020-01-01 ASSESSMENT — ENCOUNTER SYMPTOMS
WHEEZING: 1
COUGH: 1
WHEEZING: 0
RHINORRHEA: 1
STRIDOR: 0
DIARRHEA: 0
ABDOMINAL PAIN: 1
SHORTNESS OF BREATH: 1
SHORTNESS OF BREATH: 1
WHEEZING: 0
SINUS PRESSURE: 1
NAUSEA: 1
ABDOMINAL DISTENTION: 1
ABDOMINAL DISTENTION: 1
COUGH: 1
VOMITING: 1
COUGH: 1
TACHYPNEA: 1
SHORTNESS OF BREATH: 1
CHEST TIGHTNESS: 0
CHEST TIGHTNESS: 1

## 2020-01-01 ASSESSMENT — PAIN SCALES - GENERAL
PAINLEVEL_OUTOF10: 8
PAINLEVEL_OUTOF10: 5
PAINLEVEL_OUTOF10: 6
PAINLEVEL_OUTOF10: 6
PAINLEVEL_OUTOF10: 0
PAINLEVEL_OUTOF10: 0
PAINLEVEL_OUTOF10: 5
PAINLEVEL_OUTOF10: 0
PAINLEVEL_OUTOF10: 9
PAINLEVEL_OUTOF10: 0
PAINLEVEL_OUTOF10: 6
PAINLEVEL_OUTOF10: 4
PAINLEVEL_OUTOF10: 6

## 2020-01-01 ASSESSMENT — PAIN DESCRIPTION - FREQUENCY
FREQUENCY: CONTINUOUS
FREQUENCY: CONTINUOUS
FREQUENCY: INTERMITTENT
FREQUENCY: CONTINUOUS
FREQUENCY: CONTINUOUS

## 2020-01-01 ASSESSMENT — PAIN DESCRIPTION - LOCATION
LOCATION: ABDOMEN;CHEST
LOCATION: CHEST
LOCATION: GENERALIZED
LOCATION: LEG
LOCATION: BACK
LOCATION: HEAD
LOCATION: LEG
LOCATION: FOOT

## 2020-01-01 ASSESSMENT — PAIN DESCRIPTION - PROGRESSION
CLINICAL_PROGRESSION: GRADUALLY WORSENING
CLINICAL_PROGRESSION: NOT CHANGED
CLINICAL_PROGRESSION: GRADUALLY WORSENING
CLINICAL_PROGRESSION: GRADUALLY WORSENING

## 2020-01-01 ASSESSMENT — PAIN DESCRIPTION - ORIENTATION
ORIENTATION: MID
ORIENTATION: RIGHT;LEFT
ORIENTATION: MID
ORIENTATION: RIGHT;LEFT
ORIENTATION: RIGHT;LEFT

## 2020-01-01 ASSESSMENT — PAIN - FUNCTIONAL ASSESSMENT
PAIN_FUNCTIONAL_ASSESSMENT: PREVENTS OR INTERFERES SOME ACTIVE ACTIVITIES AND ADLS
PAIN_FUNCTIONAL_ASSESSMENT: PREVENTS OR INTERFERES SOME ACTIVE ACTIVITIES AND ADLS
PAIN_FUNCTIONAL_ASSESSMENT: ACTIVITIES ARE NOT PREVENTED

## 2020-01-01 ASSESSMENT — PAIN DESCRIPTION - DESCRIPTORS
DESCRIPTORS: ACHING
DESCRIPTORS: BURNING;PINS AND NEEDLES
DESCRIPTORS: SORE
DESCRIPTORS: DISCOMFORT
DESCRIPTORS: ACHING

## 2020-01-01 ASSESSMENT — PAIN DESCRIPTION - ONSET
ONSET: GRADUAL
ONSET: ON-GOING

## 2020-01-09 ENCOUNTER — TELEPHONE (OUTPATIENT)
Dept: CARDIOLOGY CLINIC | Age: 55
End: 2020-01-09

## 2020-01-10 ENCOUNTER — HOSPITAL ENCOUNTER (EMERGENCY)
Age: 55
Discharge: OTHER INSTITUTION | End: 2020-01-10
Attending: EMERGENCY MEDICINE
Payer: COMMERCIAL

## 2020-01-10 ENCOUNTER — APPOINTMENT (OUTPATIENT)
Dept: NUCLEAR MEDICINE | Age: 55
DRG: 194 | End: 2020-01-10
Attending: FAMILY MEDICINE
Payer: COMMERCIAL

## 2020-01-10 ENCOUNTER — HOSPITAL ENCOUNTER (INPATIENT)
Age: 55
LOS: 2 days | Discharge: HOME | DRG: 194 | End: 2020-01-12
Attending: FAMILY MEDICINE | Admitting: HOSPITALIST
Payer: COMMERCIAL

## 2020-01-10 ENCOUNTER — APPOINTMENT (OUTPATIENT)
Dept: GENERAL RADIOLOGY | Age: 55
End: 2020-01-10
Payer: COMMERCIAL

## 2020-01-10 VITALS
SYSTOLIC BLOOD PRESSURE: 91 MMHG | HEART RATE: 100 BPM | WEIGHT: 245 LBS | RESPIRATION RATE: 18 BRPM | OXYGEN SATURATION: 96 % | BODY MASS INDEX: 41.83 KG/M2 | TEMPERATURE: 98.1 F | DIASTOLIC BLOOD PRESSURE: 59 MMHG | HEIGHT: 64 IN

## 2020-01-10 PROBLEM — I50.23 ACUTE ON CHRONIC SYSTOLIC CHF (CONGESTIVE HEART FAILURE) (HCC): Status: ACTIVE | Noted: 2020-01-10

## 2020-01-10 PROBLEM — L03.116 CELLULITIS OF FOOT, LEFT: Status: ACTIVE | Noted: 2020-01-10

## 2020-01-10 LAB
A/G RATIO: 0.8 (ref 1.1–2.2)
ALBUMIN SERPL-MCNC: 3.5 G/DL (ref 3.4–5)
ALP BLD-CCNC: 89 U/L (ref 40–129)
ALT SERPL-CCNC: <5 U/L (ref 10–40)
ANION GAP SERPL CALCULATED.3IONS-SCNC: 17 MMOL/L (ref 3–16)
AST SERPL-CCNC: 13 U/L (ref 15–37)
BASOPHILS ABSOLUTE: 0.1 K/UL (ref 0–0.2)
BASOPHILS RELATIVE PERCENT: 0.4 %
BILIRUB SERPL-MCNC: <0.2 MG/DL (ref 0–1)
BUN BLDV-MCNC: 55 MG/DL (ref 7–20)
CALCIUM SERPL-MCNC: 10 MG/DL (ref 8.3–10.6)
CHLORIDE BLD-SCNC: 85 MMOL/L (ref 99–110)
CO2: 32 MMOL/L (ref 21–32)
CREAT SERPL-MCNC: 2.1 MG/DL (ref 0.6–1.1)
D DIMER: 351 NG/ML DDU (ref 0–229)
EKG ATRIAL RATE: 102 BPM
EKG DIAGNOSIS: NORMAL
EKG P AXIS: 70 DEGREES
EKG P-R INTERVAL: 212 MS
EKG Q-T INTERVAL: 350 MS
EKG QRS DURATION: 110 MS
EKG QTC CALCULATION (BAZETT): 456 MS
EKG R AXIS: -69 DEGREES
EKG T AXIS: 45 DEGREES
EKG VENTRICULAR RATE: 102 BPM
EOSINOPHILS ABSOLUTE: 0.3 K/UL (ref 0–0.6)
EOSINOPHILS RELATIVE PERCENT: 2.4 %
GFR AFRICAN AMERICAN: 29
GFR NON-AFRICAN AMERICAN: 24
GLOBULIN: 4.3 G/DL
GLUCOSE BLD-MCNC: 158 MG/DL (ref 70–99)
GLUCOSE BLD-MCNC: 189 MG/DL (ref 70–99)
GLUCOSE BLD-MCNC: 232 MG/DL (ref 70–99)
GLUCOSE BLD-MCNC: 313 MG/DL (ref 70–99)
HCT VFR BLD CALC: 31.7 % (ref 36–48)
HEMOGLOBIN: 10.2 G/DL (ref 12–16)
IRON SATURATION: 14 % (ref 15–50)
IRON: 52 UG/DL (ref 37–145)
LACTIC ACID, SEPSIS: 1.3 MMOL/L (ref 0.4–1.9)
LYMPHOCYTES ABSOLUTE: 1.3 K/UL (ref 1–5.1)
LYMPHOCYTES RELATIVE PERCENT: 9.8 %
MAGNESIUM: 1.5 MG/DL (ref 1.8–2.4)
MCH RBC QN AUTO: 29 PG (ref 26–34)
MCHC RBC AUTO-ENTMCNC: 32.2 G/DL (ref 31–36)
MCV RBC AUTO: 89.8 FL (ref 80–100)
MONOCYTES ABSOLUTE: 0.6 K/UL (ref 0–1.3)
MONOCYTES RELATIVE PERCENT: 4.4 %
NEUTROPHILS ABSOLUTE: 11 K/UL (ref 1.7–7.7)
NEUTROPHILS RELATIVE PERCENT: 83 %
PDW BLD-RTO: 15.2 % (ref 12.4–15.4)
PERFORMED ON: ABNORMAL
PLATELET # BLD: 297 K/UL (ref 135–450)
PMV BLD AUTO: 8.1 FL (ref 5–10.5)
POTASSIUM REFLEX MAGNESIUM: 3.3 MMOL/L (ref 3.5–5.1)
PRO-BNP: 2534 PG/ML (ref 0–124)
RBC # BLD: 3.53 M/UL (ref 4–5.2)
SODIUM BLD-SCNC: 134 MMOL/L (ref 136–145)
TOTAL IRON BINDING CAPACITY: 370 UG/DL (ref 260–445)
TOTAL PROTEIN: 7.8 G/DL (ref 6.4–8.2)
TROPONIN: 0.02 NG/ML
TSH SERPL DL<=0.05 MIU/L-ACNC: 2.03 UIU/ML (ref 0.27–4.2)
WBC # BLD: 13.2 K/UL (ref 4–11)

## 2020-01-10 PROCEDURE — 2500000003 HC RX 250 WO HCPCS: Performed by: EMERGENCY MEDICINE

## 2020-01-10 PROCEDURE — 84443 ASSAY THYROID STIM HORMONE: CPT

## 2020-01-10 PROCEDURE — 3430000000 HC RX DIAGNOSTIC RADIOPHARMACEUTICAL: Performed by: FAMILY MEDICINE

## 2020-01-10 PROCEDURE — 84484 ASSAY OF TROPONIN QUANT: CPT

## 2020-01-10 PROCEDURE — 36415 COLL VENOUS BLD VENIPUNCTURE: CPT

## 2020-01-10 PROCEDURE — 6360000002 HC RX W HCPCS: Performed by: FAMILY MEDICINE

## 2020-01-10 PROCEDURE — 78582 LUNG VENTILAT&PERFUS IMAGING: CPT

## 2020-01-10 PROCEDURE — A9558 XE133 XENON 10MCI: HCPCS | Performed by: FAMILY MEDICINE

## 2020-01-10 PROCEDURE — 2580000003 HC RX 258: Performed by: EMERGENCY MEDICINE

## 2020-01-10 PROCEDURE — 71045 X-RAY EXAM CHEST 1 VIEW: CPT

## 2020-01-10 PROCEDURE — 2060000000 HC ICU INTERMEDIATE R&B

## 2020-01-10 PROCEDURE — 85025 COMPLETE CBC W/AUTO DIFF WBC: CPT

## 2020-01-10 PROCEDURE — 96367 TX/PROPH/DG ADDL SEQ IV INF: CPT

## 2020-01-10 PROCEDURE — 83605 ASSAY OF LACTIC ACID: CPT

## 2020-01-10 PROCEDURE — 2580000003 HC RX 258: Performed by: STUDENT IN AN ORGANIZED HEALTH CARE EDUCATION/TRAINING PROGRAM

## 2020-01-10 PROCEDURE — 85379 FIBRIN DEGRADATION QUANT: CPT

## 2020-01-10 PROCEDURE — 96375 TX/PRO/DX INJ NEW DRUG ADDON: CPT

## 2020-01-10 PROCEDURE — 96366 THER/PROPH/DIAG IV INF ADDON: CPT

## 2020-01-10 PROCEDURE — 80053 COMPREHEN METABOLIC PANEL: CPT

## 2020-01-10 PROCEDURE — 93005 ELECTROCARDIOGRAM TRACING: CPT | Performed by: EMERGENCY MEDICINE

## 2020-01-10 PROCEDURE — 83880 ASSAY OF NATRIURETIC PEPTIDE: CPT

## 2020-01-10 PROCEDURE — 83550 IRON BINDING TEST: CPT

## 2020-01-10 PROCEDURE — 6360000002 HC RX W HCPCS: Performed by: STUDENT IN AN ORGANIZED HEALTH CARE EDUCATION/TRAINING PROGRAM

## 2020-01-10 PROCEDURE — A9540 TC99M MAA: HCPCS | Performed by: FAMILY MEDICINE

## 2020-01-10 PROCEDURE — 6370000000 HC RX 637 (ALT 250 FOR IP): Performed by: STUDENT IN AN ORGANIZED HEALTH CARE EDUCATION/TRAINING PROGRAM

## 2020-01-10 PROCEDURE — 6360000002 HC RX W HCPCS: Performed by: EMERGENCY MEDICINE

## 2020-01-10 PROCEDURE — 99285 EMERGENCY DEPT VISIT HI MDM: CPT

## 2020-01-10 PROCEDURE — 83735 ASSAY OF MAGNESIUM: CPT

## 2020-01-10 PROCEDURE — 2580000003 HC RX 258

## 2020-01-10 PROCEDURE — 96365 THER/PROPH/DIAG IV INF INIT: CPT

## 2020-01-10 PROCEDURE — 2580000003 HC RX 258: Performed by: FAMILY MEDICINE

## 2020-01-10 PROCEDURE — 87040 BLOOD CULTURE FOR BACTERIA: CPT

## 2020-01-10 PROCEDURE — 93010 ELECTROCARDIOGRAM REPORT: CPT | Performed by: INTERNAL MEDICINE

## 2020-01-10 PROCEDURE — 6370000000 HC RX 637 (ALT 250 FOR IP): Performed by: FAMILY MEDICINE

## 2020-01-10 PROCEDURE — 83540 ASSAY OF IRON: CPT

## 2020-01-10 RX ORDER — SODIUM CHLORIDE 0.9 % (FLUSH) 0.9 %
10 SYRINGE (ML) INJECTION EVERY 12 HOURS SCHEDULED
Status: DISCONTINUED | OUTPATIENT
Start: 2020-01-10 | End: 2020-01-12 | Stop reason: HOSPADM

## 2020-01-10 RX ORDER — DEXTROSE MONOHYDRATE 25 G/50ML
12.5 INJECTION, SOLUTION INTRAVENOUS PRN
Status: DISCONTINUED | OUTPATIENT
Start: 2020-01-10 | End: 2020-01-12 | Stop reason: HOSPADM

## 2020-01-10 RX ORDER — XENON XE-133 10 MCI/1
14.4 GAS RESPIRATORY (INHALATION)
Status: COMPLETED | OUTPATIENT
Start: 2020-01-10 | End: 2020-01-10

## 2020-01-10 RX ORDER — ONDANSETRON 2 MG/ML
4 INJECTION INTRAMUSCULAR; INTRAVENOUS EVERY 6 HOURS PRN
Status: DISCONTINUED | OUTPATIENT
Start: 2020-01-10 | End: 2020-01-12 | Stop reason: HOSPADM

## 2020-01-10 RX ORDER — FAMOTIDINE 20 MG/1
20 TABLET, FILM COATED ORAL DAILY
Status: DISCONTINUED | OUTPATIENT
Start: 2020-01-10 | End: 2020-01-10

## 2020-01-10 RX ORDER — CARVEDILOL 3.12 MG/1
3.12 TABLET ORAL 2 TIMES DAILY WITH MEALS
Status: DISCONTINUED | OUTPATIENT
Start: 2020-01-10 | End: 2020-01-12 | Stop reason: HOSPADM

## 2020-01-10 RX ORDER — ARIPIPRAZOLE 10 MG/1
10 TABLET ORAL DAILY
Status: DISCONTINUED | OUTPATIENT
Start: 2020-01-10 | End: 2020-01-12 | Stop reason: HOSPADM

## 2020-01-10 RX ORDER — SPIRONOLACTONE 100 MG/1
100 TABLET, FILM COATED ORAL 2 TIMES DAILY
Status: DISCONTINUED | OUTPATIENT
Start: 2020-01-10 | End: 2020-01-11

## 2020-01-10 RX ORDER — INSULIN GLARGINE 100 [IU]/ML
60 INJECTION, SOLUTION SUBCUTANEOUS NIGHTLY
Status: DISCONTINUED | OUTPATIENT
Start: 2020-01-10 | End: 2020-01-12 | Stop reason: HOSPADM

## 2020-01-10 RX ORDER — SODIUM CHLORIDE 9 MG/ML
INJECTION, SOLUTION INTRAVENOUS
Status: COMPLETED
Start: 2020-01-10 | End: 2020-01-10

## 2020-01-10 RX ORDER — FUROSEMIDE 10 MG/ML
40 INJECTION INTRAMUSCULAR; INTRAVENOUS ONCE
Status: COMPLETED | OUTPATIENT
Start: 2020-01-10 | End: 2020-01-10

## 2020-01-10 RX ORDER — SACCHAROMYCES BOULARDII 250 MG
250 CAPSULE ORAL 2 TIMES DAILY
Status: DISCONTINUED | OUTPATIENT
Start: 2020-01-10 | End: 2020-01-12 | Stop reason: HOSPADM

## 2020-01-10 RX ORDER — SODIUM CHLORIDE 0.9 % (FLUSH) 0.9 %
10 SYRINGE (ML) INJECTION PRN
Status: DISCONTINUED | OUTPATIENT
Start: 2020-01-10 | End: 2020-01-12 | Stop reason: HOSPADM

## 2020-01-10 RX ORDER — BENZTROPINE MESYLATE 1 MG/1
1 TABLET ORAL DAILY
Status: DISCONTINUED | OUTPATIENT
Start: 2020-01-10 | End: 2020-01-12 | Stop reason: HOSPADM

## 2020-01-10 RX ORDER — 0.9 % SODIUM CHLORIDE 0.9 %
500 INTRAVENOUS SOLUTION INTRAVENOUS ONCE
Status: COMPLETED | OUTPATIENT
Start: 2020-01-10 | End: 2020-01-10

## 2020-01-10 RX ORDER — BUSPIRONE HYDROCHLORIDE 10 MG/1
30 TABLET ORAL 2 TIMES DAILY
Status: DISCONTINUED | OUTPATIENT
Start: 2020-01-10 | End: 2020-01-10 | Stop reason: SDUPTHER

## 2020-01-10 RX ORDER — CLINDAMYCIN PHOSPHATE 600 MG/50ML
600 INJECTION INTRAVENOUS ONCE
Status: COMPLETED | OUTPATIENT
Start: 2020-01-10 | End: 2020-01-10

## 2020-01-10 RX ORDER — PANTOPRAZOLE SODIUM 40 MG/1
40 TABLET, DELAYED RELEASE ORAL 2 TIMES DAILY
Status: DISCONTINUED | OUTPATIENT
Start: 2020-01-10 | End: 2020-01-12 | Stop reason: HOSPADM

## 2020-01-10 RX ORDER — DEXTROSE MONOHYDRATE 50 MG/ML
INJECTION, SOLUTION INTRAVENOUS
Status: DISCONTINUED
Start: 2020-01-10 | End: 2020-01-10 | Stop reason: HOSPADM

## 2020-01-10 RX ORDER — DEXTROSE MONOHYDRATE 50 MG/ML
100 INJECTION, SOLUTION INTRAVENOUS PRN
Status: DISCONTINUED | OUTPATIENT
Start: 2020-01-10 | End: 2020-01-12 | Stop reason: HOSPADM

## 2020-01-10 RX ORDER — BUPRENORPHINE AND NALOXONE 8; 2 MG/1; MG/1
1 FILM, SOLUBLE BUCCAL; SUBLINGUAL 2 TIMES DAILY
Status: DISCONTINUED | OUTPATIENT
Start: 2020-01-10 | End: 2020-01-12 | Stop reason: HOSPADM

## 2020-01-10 RX ORDER — NICOTINE POLACRILEX 4 MG
15 LOZENGE BUCCAL PRN
Status: DISCONTINUED | OUTPATIENT
Start: 2020-01-10 | End: 2020-01-12 | Stop reason: HOSPADM

## 2020-01-10 RX ORDER — ATORVASTATIN CALCIUM 80 MG/1
80 TABLET, FILM COATED ORAL NIGHTLY
Status: DISCONTINUED | OUTPATIENT
Start: 2020-01-10 | End: 2020-01-12 | Stop reason: HOSPADM

## 2020-01-10 RX ORDER — ALBUTEROL SULFATE 90 UG/1
2 AEROSOL, METERED RESPIRATORY (INHALATION) EVERY 4 HOURS PRN
Status: DISCONTINUED | OUTPATIENT
Start: 2020-01-10 | End: 2020-01-12 | Stop reason: HOSPADM

## 2020-01-10 RX ORDER — CLINDAMYCIN HYDROCHLORIDE 150 MG/1
300 CAPSULE ORAL EVERY 6 HOURS SCHEDULED
Status: DISCONTINUED | OUTPATIENT
Start: 2020-01-10 | End: 2020-01-11

## 2020-01-10 RX ORDER — TORSEMIDE 100 MG/1
100 TABLET ORAL DAILY
Status: DISCONTINUED | OUTPATIENT
Start: 2020-01-10 | End: 2020-01-10

## 2020-01-10 RX ORDER — CLOPIDOGREL BISULFATE 75 MG/1
75 TABLET ORAL DAILY
Status: DISCONTINUED | OUTPATIENT
Start: 2020-01-10 | End: 2020-01-12 | Stop reason: HOSPADM

## 2020-01-10 RX ORDER — FUROSEMIDE 10 MG/ML
40 INJECTION INTRAMUSCULAR; INTRAVENOUS 2 TIMES DAILY
Status: DISCONTINUED | OUTPATIENT
Start: 2020-01-10 | End: 2020-01-10

## 2020-01-10 RX ORDER — POTASSIUM CHLORIDE 20 MEQ/1
40 TABLET, EXTENDED RELEASE ORAL
Status: DISCONTINUED | OUTPATIENT
Start: 2020-01-10 | End: 2020-01-11

## 2020-01-10 RX ORDER — BUSPIRONE HYDROCHLORIDE 5 MG/1
30 TABLET ORAL 2 TIMES DAILY
Status: DISCONTINUED | OUTPATIENT
Start: 2020-01-10 | End: 2020-01-12 | Stop reason: HOSPADM

## 2020-01-10 RX ORDER — ASPIRIN 81 MG/1
81 TABLET ORAL DAILY
Status: DISCONTINUED | OUTPATIENT
Start: 2020-01-10 | End: 2020-01-12 | Stop reason: HOSPADM

## 2020-01-10 RX ADMIN — ENOXAPARIN SODIUM 40 MG: 40 INJECTION SUBCUTANEOUS at 10:15

## 2020-01-10 RX ADMIN — CLINDAMYCIN HYDROCHLORIDE 300 MG: 150 CAPSULE ORAL at 18:32

## 2020-01-10 RX ADMIN — INSULIN GLARGINE 60 UNITS: 100 INJECTION, SOLUTION SUBCUTANEOUS at 20:24

## 2020-01-10 RX ADMIN — POTASSIUM CHLORIDE 40 MEQ: 20 TABLET, EXTENDED RELEASE ORAL at 13:25

## 2020-01-10 RX ADMIN — BUSPIRONE HYDROCHLORIDE 30 MG: 5 TABLET ORAL at 20:00

## 2020-01-10 RX ADMIN — Medication 250 MG: at 20:33

## 2020-01-10 RX ADMIN — BUPRENORPHINE HYDROCHLORIDE, NALOXONE HYDROCHLORIDE 1 FILM: 8; 2 FILM, SOLUBLE BUCCAL; SUBLINGUAL at 20:00

## 2020-01-10 RX ADMIN — POTASSIUM CHLORIDE 40 MEQ: 20 TABLET, EXTENDED RELEASE ORAL at 18:32

## 2020-01-10 RX ADMIN — BENZTROPINE MESYLATE 1 MG: 1 TABLET ORAL at 10:22

## 2020-01-10 RX ADMIN — FUROSEMIDE 40 MG: 10 INJECTION, SOLUTION INTRAMUSCULAR; INTRAVENOUS at 10:17

## 2020-01-10 RX ADMIN — SACUBITRIL AND VALSARTAN 0.5 TABLET: 24; 26 TABLET, FILM COATED ORAL at 20:16

## 2020-01-10 RX ADMIN — ATORVASTATIN CALCIUM 80 MG: 80 TABLET, FILM COATED ORAL at 20:03

## 2020-01-10 RX ADMIN — CLOPIDOGREL BISULFATE 75 MG: 75 TABLET ORAL at 10:22

## 2020-01-10 RX ADMIN — PANTOPRAZOLE SODIUM 40 MG: 40 TABLET, DELAYED RELEASE ORAL at 10:20

## 2020-01-10 RX ADMIN — ASPIRIN 81 MG: 81 TABLET, COATED ORAL at 10:22

## 2020-01-10 RX ADMIN — FUROSEMIDE 40 MG: 10 INJECTION, SOLUTION INTRAMUSCULAR; INTRAVENOUS at 02:29

## 2020-01-10 RX ADMIN — CARVEDILOL 3.12 MG: 3.12 TABLET, FILM COATED ORAL at 18:32

## 2020-01-10 RX ADMIN — CLINDAMYCIN HYDROCHLORIDE 300 MG: 150 CAPSULE ORAL at 13:25

## 2020-01-10 RX ADMIN — Medication 10 ML: at 20:03

## 2020-01-10 RX ADMIN — MAGNESIUM GLUCONATE 500 MG ORAL TABLET 400 MG: 500 TABLET ORAL at 10:22

## 2020-01-10 RX ADMIN — LAMOTRIGINE 150 MG: 25 TABLET ORAL at 20:00

## 2020-01-10 RX ADMIN — ARIPIPRAZOLE 10 MG: 10 TABLET ORAL at 10:30

## 2020-01-10 RX ADMIN — POTASSIUM CHLORIDE 40 MEQ: 20 TABLET, EXTENDED RELEASE ORAL at 10:22

## 2020-01-10 RX ADMIN — PANTOPRAZOLE SODIUM 40 MG: 40 TABLET, DELAYED RELEASE ORAL at 20:01

## 2020-01-10 RX ADMIN — SPIRONOLACTONE 100 MG: 100 TABLET, FILM COATED ORAL at 20:33

## 2020-01-10 RX ADMIN — Medication 500 ML: at 04:45

## 2020-01-10 RX ADMIN — CLINDAMYCIN PHOSPHATE 600 MG: 600 INJECTION, SOLUTION INTRAVENOUS at 02:33

## 2020-01-10 RX ADMIN — INSULIN LISPRO 1 UNITS: 100 INJECTION, SOLUTION INTRAVENOUS; SUBCUTANEOUS at 13:23

## 2020-01-10 RX ADMIN — XENON XE-133 14.4 MILLICURIE: 10 GAS RESPIRATORY (INHALATION) at 12:45

## 2020-01-10 RX ADMIN — TORSEMIDE 100 MG: 100 TABLET ORAL at 10:22

## 2020-01-10 RX ADMIN — LAMOTRIGINE 150 MG: 25 TABLET ORAL at 10:30

## 2020-01-10 RX ADMIN — BUSPIRONE HYDROCHLORIDE 30 MG: 5 TABLET ORAL at 10:31

## 2020-01-10 RX ADMIN — SPIRONOLACTONE 100 MG: 100 TABLET, FILM COATED ORAL at 10:30

## 2020-01-10 RX ADMIN — CARVEDILOL 3.12 MG: 3.12 TABLET, FILM COATED ORAL at 10:20

## 2020-01-10 RX ADMIN — INSULIN LISPRO 4 UNITS: 100 INJECTION, SOLUTION INTRAVENOUS; SUBCUTANEOUS at 20:24

## 2020-01-10 RX ADMIN — Medication 3.5 MILLICURIE: at 12:55

## 2020-01-10 RX ADMIN — FUROSEMIDE 5 MG/HR: 10 INJECTION, SOLUTION INTRAMUSCULAR; INTRAVENOUS at 15:23

## 2020-01-10 RX ADMIN — BUPRENORPHINE HYDROCHLORIDE, NALOXONE HYDROCHLORIDE 1 FILM: 8; 2 FILM, SOLUBLE BUCCAL; SUBLINGUAL at 10:30

## 2020-01-10 RX ADMIN — INSULIN LISPRO 2 UNITS: 100 INJECTION, SOLUTION INTRAVENOUS; SUBCUTANEOUS at 18:32

## 2020-01-10 RX ADMIN — Medication 10 ML: at 10:17

## 2020-01-10 RX ADMIN — MAGNESIUM SULFATE HEPTAHYDRATE 2 G: 500 INJECTION, SOLUTION INTRAMUSCULAR; INTRAVENOUS at 03:26

## 2020-01-10 RX ADMIN — SODIUM CHLORIDE 500 ML: 9 INJECTION, SOLUTION INTRAVENOUS at 04:45

## 2020-01-10 ASSESSMENT — ENCOUNTER SYMPTOMS
COUGH: 0
SHORTNESS OF BREATH: 1
SORE THROAT: 0
BACK PAIN: 0
VOMITING: 0
NAUSEA: 0
ABDOMINAL PAIN: 0

## 2020-01-10 ASSESSMENT — PAIN DESCRIPTION - LOCATION: LOCATION: CHEST

## 2020-01-10 ASSESSMENT — PAIN DESCRIPTION - DESCRIPTORS: DESCRIPTORS: PRESSURE

## 2020-01-10 ASSESSMENT — PAIN SCALES - GENERAL: PAINLEVEL_OUTOF10: 4

## 2020-01-10 NOTE — H&P
MELODY- 3.5 x 38 and 3.5 x 20 to Cx    CORONARY ANGIOPLASTY WITH STENT PLACEMENT  01/03/2018    MELODY- 3.0 x 38 and 2.75 x 26 and 2.75 x 8 and 2.75 x 22 to the LAD    FEMORAL BYPASS Right 5/16/2019    RIGHT FEMORAL POPLITEAL ARTERY BYPASS GRAFT performed by Akiko Rodriguez MD at [address]  02/14/2019    CardioMEMs insertion for CHF    OTHER SURGICAL HISTORY  10/08/2019    Bilateral LE angio with PTA occluded L SFA and restenting L Prox SFA    ROTATOR CUFF REPAIR Left 04/22/2016    TONSILLECTOMY      TUMOR EXCISION      benign tumors X 2 chest & axilla    UPPER GASTROINTESTINAL ENDOSCOPY  4/25/2013    BX barretts, HH, gastritis    UPPER GASTROINTESTINAL ENDOSCOPY  12/10/2015    UPPER GASTROINTESTINAL ENDOSCOPY  01/06/2017    Gastritis     Medications Prior to Admission:      Prior to Admission medications    Medication Sig Start Date End Date Taking? Authorizing Provider   nystatin (MYCOSTATIN) 075362 UNIT/GM cream  12/19/19   Historical Provider, MD   benztropine (COGENTIN) 1 MG tablet  12/13/19   Historical Provider, MD   blood glucose test strips (EXACTECH TEST) strip 6 times daily.  12/19/19   TRAY Leal CNP   Insulin Pen Needle (B-D ULTRAFINE III SHORT PEN) 31G X 8 MM MISC Five shots a day 12/3/19   TRAY Leal CNP   INSULIN SYRINGE .5CC/29G 29G X 1/2\" 0.5 ML MISC 1 each by Does not apply route daily 12/3/19   TRAY Leal CNP   Insulin Degludec (TRESIBA FLEXTOUCH) 100 UNIT/ML SOPN Inject 100 Units into the skin nightly 12/3/19   TRAY Leal CNP   carvedilol (COREG) 3.125 MG tablet TAKE 1 TABLET BY MOUTH TWICE A DAY WITH MEALS 11/26/19   BalbirTRAY Mohamud CNP   Liraglutide (VICTOZA) 18 MG/3ML SOPN SC injection Inject 1.2 mg into the skin daily 10/29/19   TRAY Leal CNP   vitamin D (ERGOCALCIFEROL) 1.25 MG (02085 UT) CAPS capsule Take 1 capsule by mouth once a week 10/29/19   TRAY Leal CNP   potassium chloride (KLOR-CON M) 20 MEQ extended release tablet Take 2 tablets by mouth 4 times daily 10/22/19   TRAY Montez CNP   albuterol sulfate  (90 Base) MCG/ACT inhaler INHALE 2 PUFFS BY MOUTH EVERY 6 HOURS AS NEEDED FOR WHEEZING/SHORTNESS OF BREATH 10/7/19   Dung Carlos MD   pantoprazole (PROTONIX) 40 MG tablet Take 1 tablet by mouth 2 times daily 10/1/19   Fabrizio Lewis MD   torsemide [facility]) 100 MG tablet Take 1 tablet by mouth daily 10/1/19   Fabrizio Lewis MD   metOLazone (ZAROXOLYN) 5 MG tablet Except Wed and Fri is 2.5 mg 10/1/19   Fabrizio Lewis MD   buprenorphine-naloxone (SUBOXONE) 8-2 MG SUBL SL tablet PLACE 1 TABLET UNDER THE BERNARDO TWICE A DAY 9/17/19   Historical Provider, MD   nitroGLYCERIN (NITROSTAT) 0.4 MG SL tablet PLEASE SEE ATTACHED FOR DETAILED DIRECTIONS 7/24/19   Historical Provider, MD   spironolactone (ALDACTONE) 100 MG tablet TAKE 1 TABLET BY MOUTH TWICE A DAY 9/27/19   TRAY Lugo CNP   clopidogrel (PLAVIX) 75 MG tablet TAKE 1 TABLET BY MOUTH EVERY DAY 9/3/19   TRAY Montez CNP   SPIRIVA RESPIMAT 2.5 MCG/ACT AERS inhaler INHALE 2 PUFFS INTO THE LUNGS DAILY 7/15/19   Dung Carlos MD   Varenicline Tartrate (CHANTIX PO) Take by mouth    Historical Provider, MD   magnesium oxide (MAG-OX) 400 (240 Mg) MG tablet TAKE 1 TABLET ONCE DAILY 5/1/19   Keven Mauricio MD   insulin lispro (HUMALOG KWIKPEN) 100 UNIT/ML pen Inject 15 Units into the skin 3 times daily (before meals) 4/9/19   Karely Dominguez MD   digoxin (LANOXIN) 125 MCG tablet Take 1 tablet by mouth daily 4/3/19   TRAY Montez CNP   sacubitril-valsartan (ENTRESTO) 24-26 MG per tablet Take 0.5 tablets by mouth every evening 2/25/19   TRAY Montez CNP   atorvastatin (LIPITOR) 80 MG tablet Take 80 mg by mouth nightly 5/2/18   Historical Provider, MD   busPIRone (BUSPAR) 30 MG tablet Take 30 mg by mouth 2 times daily  4/2/18   Historical Provider, MD   aspirin EC 81 MG EC tablet Take 1 tablet by mouth daily 1/8/16   Katerina Casillas MD   ARIPiprazole (ABILIFY) 10 MG tablet Take 10 mg by mouth daily     Historical Provider, MD   lamoTRIgine (LAMICTAL) 150 MG tablet Take 150 mg by mouth 2 times daily     Historical Provider, MD     Allergies:  Lisinopril    Social History:    The patient currently lives at home. TOBACCO:   reports that she quit smoking about 5 months ago. Her smoking use included cigarettes. She has a 7.50 pack-year smoking history. She has never used smokeless tobacco.  ETOH:   reports no history of alcohol use. Family History:    Reviewed in detail. Positive as follows:        Problem Relation Age of Onset    Heart Disease Maternal Grandmother     Cancer Mother         lung and brain cancer    Cancer Maternal Aunt         lung and brain cancer     REVIEW OF SYSTEMS:   Pertinent positives as noted in the HPI. All other systems reviewed and negative. PHYSICAL EXAM PERFORMED:  BP (!) 103/57   Pulse 95   Temp 97.8 °F (36.6 °C) (Oral)   Resp 16   Wt 243 lb 14.4 oz (110.6 kg)   LMP 10/24/2012   SpO2 100%   BMI 41.87 kg/m²     General appearance:  No apparent distress, appears stated age and cooperative. HEENT:  Normal cephalic, atraumatic without obvious deformity. Pupils equal, round, and reactive to light. Extra ocular muscles intact. Conjunctivae/corneas clear. Neck: Supple, with full range of motion. No jugular venous distention. Trachea midline. Respiratory:  Normal respiratory effort. Clear to auscultation, bilaterally without Rales/Wheezes/Rhonchi. Cardiovascular:  Regular rate and rhythm with normal S1/S2 without murmurs, rubs or gallops. Abdomen: Soft, non-tender, non-distended with normal bowel sounds. Musculoskeletal:  No clubbing, cyanosis bilaterally. 2+ pitting edema in bilateral LE. Full range of motion without deformity. Skin: Left dorsal foot with erythema.  Small bulla noted on distal second left toe. 1cm ulcer on lateral plantar

## 2020-01-10 NOTE — ED NOTES
Manual bp left arm 80/48. md aware. Ns bolus started at 250cc/hr. Pt denies lightheadedness or dizziness. Reports her bp is always in the 05'A systolic and was in 36'T when they sent her home from last admission. This is normal bp for her. Strategic present.       Alen Dewey RN  01/10/20 1547

## 2020-01-10 NOTE — ED PROVIDER NOTES
vomiting. Genitourinary: Negative for dysuria and hematuria. Musculoskeletal: Negative for back pain and neck pain. Skin: Negative for pallor and rash. Neurological: Negative for light-headedness and headaches. All other systems reviewed and are negative. Except as noted above the remainder of the review of systems was reviewed and negative.        PAST MEDICAL HISTORY     Past Medical History:   Diagnosis Date    Anxiety and depression     CAD (coronary artery disease) 01/2018    Cardiomyopathy ([address]     CHF (congestive heart failure) (HCC)     COPD (chronic obstructive pulmonary disease) (HCC)     Diabetes mellitus ([address]     GERD (gastroesophageal reflux disease)     Hyperlipidemia     Hypertension     Hypotension     MI (myocardial infarction) ([address]     Peripheral neuropathy     Peripheral vascular disease ([address]     Pulmonary HTN ([address]     Reflux     Sleep apnea     has never done sleep study;does not use CPAP    Wears glasses     for reading         SURGICAL HISTORY       Past Surgical History:   Procedure Laterality Date    ARTERIAL BYPASS SURGRY Left 01/06/2016    Axillary/Subclavian to Brachial Bypass w/reversed SVG    CARDIAC CATHETERIZATION  03/27/2018    Dr. Sim Thompson - at [facility] [address]      pancreatitis post surgery    CORONARY ANGIOPLASTY WITH STENT PLACEMENT  03/16/2018    MELODY- 3.5 x 38 and 3.5 x 20 to Cx    CORONARY ANGIOPLASTY WITH STENT PLACEMENT  01/03/2018    MELODY- 3.0 x 38 and 2.75 x 26 and 2.75 x 8 and 2.75 x 22 to the LAD    FEMORAL BYPASS Right 5/16/2019    RIGHT FEMORAL POPLITEAL ARTERY BYPASS GRAFT performed by Abby Orr MD at [address]  02/14/2019    CardioMEMs insertion for CHF    OTHER SURGICAL HISTORY  10/08/2019    Bilateral LE angio with PTA occluded L SFA and restenting L Prox SFA    ROTATOR CUFF REPAIR Left 04/22/2016    TONSILLECTOMY      TUMOR EXCISION      benign tumors X 2 member of club or organization: None     Attends meetings of clubs or organizations: None     Relationship status: None    Intimate partner violence:     Fear of current or ex partner: None     Emotionally abused: None     Physically abused: None     Forced sexual activity: None   Other Topics Concern    None   Social History Narrative    None       SCREENINGS               PHYSICAL EXAM    (up to 7 for level 4, 8 or more for level 5)     ED Triage Vitals [01/10/20 0011]   BP Temp Temp Source Pulse Resp SpO2 Height Weight   (!) 91/56 98.1 °F (36.7 °C) Oral 99 16 92 % 5' 4\" (1.626 m) 245 lb (111.1 kg)       Physical Exam  Vitals signs and nursing note reviewed. Constitutional:       General: She is not in acute distress. Appearance: Normal appearance. HENT:      Head: Normocephalic and atraumatic. Nose: Nose normal. No congestion. Mouth/Throat:      Mouth: Mucous membranes are moist.   Eyes:      Conjunctiva/sclera: Conjunctivae normal.   Neck:      Musculoskeletal: Normal range of motion and neck supple. Cardiovascular:      Rate and Rhythm: Regular rhythm. Tachycardia present. Pulses: Normal pulses. Heart sounds: Normal heart sounds. Pulmonary:      Effort: Pulmonary effort is normal. No respiratory distress. Breath sounds: Normal breath sounds. No wheezing. Abdominal:      General: There is no distension. Palpations: Abdomen is soft. Tenderness: There is no tenderness. Musculoskeletal:      Right lower leg: Edema present. Left lower leg: Edema present. Comments: Pitting edema to thigh bilaterally. Erythema of the bilateral lower legs. Chronic diabetic wounds on multiple toes that appear stable and are without drainage. Patient remains neurovascularly intact. No crepitus noted. Skin:     General: Skin is warm and dry. Neurological:      General: No focal deficit present. Mental Status: She is alert and oriented to person, place, and time. DIAGNOSTIC RESULTS     EKG: All EKG's are interpreted by the Emergency Department Physician who either signs or Co-signs this chart in the absence of a cardiologist.    Sinus tachycardia, rate 102, first-degree AV block, no STEMI, similar to previous EKG dated December 24, 2019    RADIOLOGY:     Interpretation per the Radiologist below, if available at the time of this note:    XR CHEST PORTABLE   Final Result   Stable portable study. LABS:  Labs Reviewed   CBC WITH AUTO DIFFERENTIAL - Abnormal; Notable for the following components:       Result Value    WBC 13.2 (*)     RBC 3.53 (*)     Hemoglobin 10.2 (*)     Hematocrit 31.7 (*)     Neutrophils Absolute 11.0 (*)     All other components within normal limits    Narrative:     Performed at:  [facility]. [facility]  [address] [place], [place] [address]J. [facility]. [facility] [address] [place] [address]   Phone [phone]   COMPREHENSIVE METABOLIC PANEL W/ REFLEX TO MG FOR LOW K - Abnormal; Notable for the following components:    Sodium 134 (*)     Potassium reflex Magnesium 3.3 (*)     Chloride 85 (*)     Anion Gap 17 (*)     Glucose 158 (*)     BUN 55 (*)     CREATININE 2.1 (*)     GFR Non- 24 (*)     GFR African American 29 (*)     Albumin/Globulin Ratio 0.8 (*)     ALT <5 (*)     AST 13 (*)     All other components within normal limits    Narrative:     Performed at:  [facility]. [facility]  [address] [place] [address]. [facility], [facility]    Phone [phone]   TROPONIN - Abnormal; Notable for the following components:    Troponin 0.02 (*)     All other components within normal limits    Narrative:     Performed at:  [facility]. [facility]  [address] [place], [place] [address]J. [facility].  [facility], [facility] Main    Phone [phone] - Abnormal; Notable for the following components:    Pro-BNP 2,534 (*)     All other components within normal limits    Narrative:     Performed at:  [facility]. [facility]  [address] [place] [address]. [address] [facility] [address]   Phone [phone]   D-DIMER, QUANTITATIVE - Abnormal; Notable for the following components:    D-Dimer, Quant 351 (*)     All other components within normal limits    Narrative:     Performed at:  [facility]. [facility]  [address] [place] [address]. [address] [facility] [employer]   Phone [phone]   MAGNESIUM - Abnormal; Notable for the following components:    Magnesium 1.50 (*)     All other components within normal limits    Narrative:     Performed at:  [facility]. [facility]  [address] [place] [address]. [facility]   Phone [phone]   CULTURE BLOOD #1   CULTURE BLOOD #2   LACTATE, SEPSIS    Narrative:     Performed at:  [facility]. [facility]  [address] [place] [address]. [address] [facility] [employer]   Phone [phone]   LACTATE, SEPSIS       All other labs were within normal range or not returned as of this dictation. EMERGENCY DEPARTMENT COURSE and DIFFERENTIAL DIAGNOSIS/MDM:   Vitals:    Vitals:    01/10/20 0011 01/10/20 0137 01/10/20 0222   BP: (!) 91/56 96/63 (!) 94/54   Pulse: 99 101 100   Resp: 16 16 16   Temp: 98.1 °F (36.7 °C)     TempSrc: Oral     SpO2: 92% 97% 97%   Weight: 245 lb (111.1 kg)     Height: 5' 4\" (1.626 m)         Patient evaluated and previous record reviewed. Patient presents with shortness of breath and bilateral lower extremity swelling. Vital signs notable for blood pressure 94/54, pulse 105, afebrile. Physical exam as documented above. Differential includes cellulitis, CHF exacerbation, PE, SHAUNNA. Lab work-up notable for leukocytosis, lactate within normal limits, elevated troponin though this could be related to elevation in patient's creatinine, elevated BNP. Chest x-ray without acute cardiopulmonary abnormality.   Patient covered with clindamycin for cellulitis and given Lasix for volume overload. Patient request to be admitted to [facility]. Patient accepted for admission. CONSULTS:  None    PROCEDURES:  Unless otherwise noted below, none     Procedures      FINAL IMPRESSION      1. Shortness of breath    2. Acute on chronic congestive heart failure, unspecified heart failure type (HCC)    3. Cellulitis of left lower extremity    4. Cellulitis of right lower extremity          DISPOSITION/PLAN   DISPOSITION Decision To Transfer 01/10/2020 02:51:09 AM      PATIENT REFERRED TO:  No follow-up provider specified. DISCHARGE MEDICATIONS:  New Prescriptions    No medications on file     Controlled Substances Monitoring:     No flowsheet data found.     (Please note that portions of this note were completed with a voice recognition program.  Efforts were made to edit the dictations but occasionally words are mis-transcribed.)    José Miguel Jane MD (electronically signed)  Attending Emergency Physician           Bryon Sotelo MD  01/10/20 1771

## 2020-01-10 NOTE — PROGRESS NOTES
RESPIRATORY THERAPY ASSESSMENT    Name:  Parminder Mcdermott  Medical Record Number:  [id number]  Age: 62 y.o. Gender: female  : 1965  Today's Date:  1/10/2020  Room:  0202/0202-01    Assessment     Is the patient being admitted for a COPD or Asthma exacerbation? No   (If yes the patient will be seen every 4 hours for the first 24 hours and then reassessed)    Patient Admission Diagnosis      Allergies  Allergies   Allergen Reactions    Lisinopril Other (See Comments)     Severe hypotension       Minimum Predicted Vital Capacity:     NA          Actual Vital Capacity:      NA              Pulmonary History:COPD  Home Oxygen Therapy:  room air  Home Respiratory Therapy:Albuterol PRN  Current Respiratory Therapy:  Albuterol PRN          Respiratory Severity Index(RSI)   Patients with orders for inhalation medications, oxygen, or any therapeutic treatment modality will be placed on Respiratory Protocol. They will be assessed with the first treatment and at least every 72 hours thereafter. The following severity scale will be used to determine frequency of treatment intervention.     Smoking History: Smoking History Less than 1ppd or less than 15 pack year = 1    Social History  Social History     Tobacco Use    Smoking status: Former Smoker     Packs/day: 0.25     Years: 30.00     Pack years: 7.50     Types: Cigarettes     Last attempt to quit: 2019     Years since quittin.4    Smokeless tobacco: Never Used   Substance Use Topics    Alcohol use: No    Drug use: Never       Recent Surgical History: None = 0  Past Surgical History  Past Surgical History:   Procedure Laterality Date    ARTERIAL BYPASS SURGRY Left 2016    Axillary/Subclavian to Brachial Bypass w/reversed SVG    CARDIAC [facility]HETERIZATION  2018    [facility]      pancreatitis post surgery    CORONARY ANGIOPLASTY WITH STENT PLACEMENT  2018    MELODY- 3.5 x 38 and

## 2020-01-10 NOTE — PROGRESS NOTES
Nutrition Education    Type and Reason for Visit: Patient Education    Nutrition Assessment:     Pt seen per [facility] for CHF diet education. Provided pt with written and verbal instruction on HF nutrition therapy. Discussed low sodium diet, daily weights, and fluid restriction. Pt voiced understanding. Pt reports that she tries to follow a low sodium diet at home and that she has been trying to limit her fluid intakes and monitor her weight as well. Pt with Hx of DM, reports that she has been loosely following a keto diet. Pt denied need for any DM diet education. Time spent: 10 minutes    · Verbally reviewed information with Patient  · Written educational materials provided. · Contact name and number provided. · Refer to Patient Education activity for more details.     Electronically signed by Renan Mark RD, LD on 1/10/20 at 11:34 AM    Contact Number: Office: [phone]; [address]: [phone]

## 2020-01-10 NOTE — PROGRESS NOTES
4 Eyes Skin Assessment     The patient is being assess for  Admission    I agree that 2 RN's have performed a thorough Head to Toe Skin Assessment on the patient. ALL assessment sites listed below have been assessed. Areas assessed by both nurses: Elías Arana RN  [x]   Head, Face, and Ears   [x]   Shoulders, Back, and Chest  [x]   Arms, Elbows, and Hands   [x]   Coccyx, Sacrum, and Ischum  [x]   Legs, Feet, and Heels        Does the Patient have Skin Breakdown? Yes a wound was noted on the Admission Assessment and an WOUND LDA was Initiated documentation include the Shaye-wound, Wound Assessment, Measurements, Dressing Treatment, Drainage, and Color\",     Noted Stable/dry diabetic ulcers to L great toe tip, third toe bottom, R lateral sole, and blister to L second toe. Red/ann marie BLE with edema.         Vivek Prevention initiated:  Yes   Wound Care Orders initiated:  No      WOC nurse consulted for Pressure Injury (Stage 3,4, Unstageable, DTI, NWPT, and Complex wounds):  NA      Nurse 1 eSignature: Electronically signed by Christopher Ochoa RN on 1/10/20 at 6:43 AM    **SHARE this note so that the co-signing nurse is able to place an eSignature**    Nurse 2 eSignature: Electronically signed by Lilo Javed RN on 1/10/20 at 7:19 AM

## 2020-01-10 NOTE — ED NOTES
Pt amb to bathroom prior to transfer. Urine output 400cc at this time. No dizziness or lightheadedness with change in position or walking. bp 91/59 at time of transfer. Pt amb back to ems cot without difficulty.       Jose Miguel Broussard RN  01/10/20 1490

## 2020-01-10 NOTE — TELEPHONE ENCOUNTER
Reviewed CardioMEMs readings. Her readings for past 10 days have been \"dampened\" and not giving accurate readings. She is currently as of this am.  Will stop in to see her.    Neela Shah, TRAY - CNP

## 2020-01-11 LAB
ANION GAP SERPL CALCULATED.3IONS-SCNC: 13 MMOL/L (ref 3–16)
BASOPHILS ABSOLUTE: 0.1 K/UL (ref 0–0.2)
BASOPHILS RELATIVE PERCENT: 0.4 %
BUN BLDV-MCNC: 67 MG/DL (ref 7–20)
CALCIUM SERPL-MCNC: 9.1 MG/DL (ref 8.3–10.6)
CHLORIDE BLD-SCNC: 89 MMOL/L (ref 99–110)
CHOLESTEROL, TOTAL: 158 MG/DL (ref 0–199)
CO2: 30 MMOL/L (ref 21–32)
CREAT SERPL-MCNC: 2.5 MG/DL (ref 0.6–1.1)
EOSINOPHILS ABSOLUTE: 0.3 K/UL (ref 0–0.6)
EOSINOPHILS RELATIVE PERCENT: 2.5 %
GFR AFRICAN AMERICAN: 24
GFR NON-AFRICAN AMERICAN: 20
GLUCOSE BLD-MCNC: 192 MG/DL (ref 70–99)
GLUCOSE BLD-MCNC: 199 MG/DL (ref 70–99)
GLUCOSE BLD-MCNC: 207 MG/DL (ref 70–99)
GLUCOSE BLD-MCNC: 233 MG/DL (ref 70–99)
GLUCOSE BLD-MCNC: 245 MG/DL (ref 70–99)
HCT VFR BLD CALC: 27.2 % (ref 36–48)
HDLC SERPL-MCNC: 27 MG/DL (ref 40–60)
HEMOGLOBIN: 9.1 G/DL (ref 12–16)
LDL CHOLESTEROL CALCULATED: 103 MG/DL
LYMPHOCYTES ABSOLUTE: 1.1 K/UL (ref 1–5.1)
LYMPHOCYTES RELATIVE PERCENT: 8.1 %
MAGNESIUM: 2.3 MG/DL (ref 1.8–2.4)
MCH RBC QN AUTO: 30 PG (ref 26–34)
MCHC RBC AUTO-ENTMCNC: 33.4 G/DL (ref 31–36)
MCV RBC AUTO: 89.8 FL (ref 80–100)
MONOCYTES ABSOLUTE: 0.9 K/UL (ref 0–1.3)
MONOCYTES RELATIVE PERCENT: 6.7 %
NEUTROPHILS ABSOLUTE: 11 K/UL (ref 1.7–7.7)
NEUTROPHILS RELATIVE PERCENT: 82.3 %
PDW BLD-RTO: 15.4 % (ref 12.4–15.4)
PERFORMED ON: ABNORMAL
PLATELET # BLD: 324 K/UL (ref 135–450)
PMV BLD AUTO: 8.8 FL (ref 5–10.5)
POTASSIUM SERPL-SCNC: 4.6 MMOL/L (ref 3.5–5.1)
RBC # BLD: 3.03 M/UL (ref 4–5.2)
SODIUM BLD-SCNC: 132 MMOL/L (ref 136–145)
TRIGL SERPL-MCNC: 140 MG/DL (ref 0–150)
VLDLC SERPL CALC-MCNC: 28 MG/DL
WBC # BLD: 13.3 K/UL (ref 4–11)

## 2020-01-11 PROCEDURE — 2500000003 HC RX 250 WO HCPCS: Performed by: STUDENT IN AN ORGANIZED HEALTH CARE EDUCATION/TRAINING PROGRAM

## 2020-01-11 PROCEDURE — 6370000000 HC RX 637 (ALT 250 FOR IP): Performed by: STUDENT IN AN ORGANIZED HEALTH CARE EDUCATION/TRAINING PROGRAM

## 2020-01-11 PROCEDURE — 80061 LIPID PANEL: CPT

## 2020-01-11 PROCEDURE — 2580000003 HC RX 258: Performed by: STUDENT IN AN ORGANIZED HEALTH CARE EDUCATION/TRAINING PROGRAM

## 2020-01-11 PROCEDURE — 36415 COLL VENOUS BLD VENIPUNCTURE: CPT

## 2020-01-11 PROCEDURE — 94640 AIRWAY INHALATION TREATMENT: CPT

## 2020-01-11 PROCEDURE — 6370000000 HC RX 637 (ALT 250 FOR IP): Performed by: FAMILY MEDICINE

## 2020-01-11 PROCEDURE — 80048 BASIC METABOLIC PNL TOTAL CA: CPT

## 2020-01-11 PROCEDURE — 6360000002 HC RX W HCPCS: Performed by: HOSPITALIST

## 2020-01-11 PROCEDURE — 2580000003 HC RX 258: Performed by: FAMILY MEDICINE

## 2020-01-11 PROCEDURE — 85025 COMPLETE CBC W/AUTO DIFF WBC: CPT

## 2020-01-11 PROCEDURE — 6360000002 HC RX W HCPCS: Performed by: FAMILY MEDICINE

## 2020-01-11 PROCEDURE — 83735 ASSAY OF MAGNESIUM: CPT

## 2020-01-11 PROCEDURE — 2060000000 HC ICU INTERMEDIATE R&B

## 2020-01-11 PROCEDURE — 6360000002 HC RX W HCPCS: Performed by: STUDENT IN AN ORGANIZED HEALTH CARE EDUCATION/TRAINING PROGRAM

## 2020-01-11 RX ORDER — HEPARIN SODIUM 5000 [USP'U]/ML
5000 INJECTION, SOLUTION INTRAVENOUS; SUBCUTANEOUS EVERY 8 HOURS SCHEDULED
Status: DISCONTINUED | OUTPATIENT
Start: 2020-01-11 | End: 2020-01-12 | Stop reason: HOSPADM

## 2020-01-11 RX ORDER — SODIUM CHLORIDE 9 MG/ML
INJECTION, SOLUTION INTRAVENOUS
Status: DISPENSED
Start: 2020-01-11 | End: 2020-01-11

## 2020-01-11 RX ORDER — ACETAMINOPHEN 325 MG/1
650 TABLET ORAL EVERY 4 HOURS PRN
Status: DISCONTINUED | OUTPATIENT
Start: 2020-01-11 | End: 2020-01-12 | Stop reason: HOSPADM

## 2020-01-11 RX ADMIN — Medication 10 ML: at 08:45

## 2020-01-11 RX ADMIN — HEPARIN SODIUM 5000 UNITS: 5000 INJECTION INTRAVENOUS; SUBCUTANEOUS at 14:22

## 2020-01-11 RX ADMIN — ACETAMINOPHEN 650 MG: 325 TABLET ORAL at 16:39

## 2020-01-11 RX ADMIN — DEXTROSE MONOHYDRATE 600 MG: 50 INJECTION, SOLUTION INTRAVENOUS at 17:36

## 2020-01-11 RX ADMIN — PANTOPRAZOLE SODIUM 40 MG: 40 TABLET, DELAYED RELEASE ORAL at 16:32

## 2020-01-11 RX ADMIN — CLOPIDOGREL BISULFATE 75 MG: 75 TABLET ORAL at 08:44

## 2020-01-11 RX ADMIN — LAMOTRIGINE 150 MG: 25 TABLET ORAL at 21:39

## 2020-01-11 RX ADMIN — TIOTROPIUM BROMIDE 18 MCG: 18 CAPSULE ORAL; RESPIRATORY (INHALATION) at 08:54

## 2020-01-11 RX ADMIN — MAGNESIUM GLUCONATE 500 MG ORAL TABLET 400 MG: 500 TABLET ORAL at 08:44

## 2020-01-11 RX ADMIN — Medication 250 MG: at 21:40

## 2020-01-11 RX ADMIN — POTASSIUM CHLORIDE 40 MEQ: 20 TABLET, EXTENDED RELEASE ORAL at 12:33

## 2020-01-11 RX ADMIN — INSULIN LISPRO 2 UNITS: 100 INJECTION, SOLUTION INTRAVENOUS; SUBCUTANEOUS at 17:33

## 2020-01-11 RX ADMIN — POTASSIUM CHLORIDE 40 MEQ: 20 TABLET, EXTENDED RELEASE ORAL at 08:44

## 2020-01-11 RX ADMIN — BUSPIRONE HYDROCHLORIDE 30 MG: 5 TABLET ORAL at 21:57

## 2020-01-11 RX ADMIN — DEXTROSE MONOHYDRATE 600 MG: 50 INJECTION, SOLUTION INTRAVENOUS at 11:10

## 2020-01-11 RX ADMIN — CARVEDILOL 3.12 MG: 3.12 TABLET, FILM COATED ORAL at 17:36

## 2020-01-11 RX ADMIN — LAMOTRIGINE 150 MG: 25 TABLET ORAL at 08:44

## 2020-01-11 RX ADMIN — ATORVASTATIN CALCIUM 80 MG: 80 TABLET, FILM COATED ORAL at 21:40

## 2020-01-11 RX ADMIN — INSULIN GLARGINE 60 UNITS: 100 INJECTION, SOLUTION SUBCUTANEOUS at 21:41

## 2020-01-11 RX ADMIN — Medication 250 MG: at 08:44

## 2020-01-11 RX ADMIN — BUSPIRONE HYDROCHLORIDE 30 MG: 5 TABLET ORAL at 08:44

## 2020-01-11 RX ADMIN — ENOXAPARIN SODIUM 40 MG: 40 INJECTION SUBCUTANEOUS at 08:44

## 2020-01-11 RX ADMIN — CLINDAMYCIN HYDROCHLORIDE 300 MG: 150 CAPSULE ORAL at 02:58

## 2020-01-11 RX ADMIN — CARVEDILOL 3.12 MG: 3.12 TABLET, FILM COATED ORAL at 08:44

## 2020-01-11 RX ADMIN — PANTOPRAZOLE SODIUM 40 MG: 40 TABLET, DELAYED RELEASE ORAL at 08:45

## 2020-01-11 RX ADMIN — FUROSEMIDE 5 MG/HR: 10 INJECTION, SOLUTION INTRAMUSCULAR; INTRAVENOUS at 08:35

## 2020-01-11 RX ADMIN — BUPRENORPHINE HYDROCHLORIDE, NALOXONE HYDROCHLORIDE 1 FILM: 8; 2 FILM, SOLUBLE BUCCAL; SUBLINGUAL at 21:40

## 2020-01-11 RX ADMIN — HEPARIN SODIUM 5000 UNITS: 5000 INJECTION INTRAVENOUS; SUBCUTANEOUS at 21:52

## 2020-01-11 RX ADMIN — SPIRONOLACTONE 100 MG: 100 TABLET, FILM COATED ORAL at 08:47

## 2020-01-11 RX ADMIN — INSULIN LISPRO 1 UNITS: 100 INJECTION, SOLUTION INTRAVENOUS; SUBCUTANEOUS at 21:40

## 2020-01-11 RX ADMIN — ASPIRIN 81 MG: 81 TABLET, COATED ORAL at 08:44

## 2020-01-11 RX ADMIN — ACETAMINOPHEN 650 MG: 325 TABLET ORAL at 11:09

## 2020-01-11 RX ADMIN — BUPRENORPHINE HYDROCHLORIDE, NALOXONE HYDROCHLORIDE 1 FILM: 8; 2 FILM, SOLUBLE BUCCAL; SUBLINGUAL at 08:45

## 2020-01-11 RX ADMIN — INSULIN LISPRO 1 UNITS: 100 INJECTION, SOLUTION INTRAVENOUS; SUBCUTANEOUS at 08:47

## 2020-01-11 RX ADMIN — INSULIN LISPRO 2 UNITS: 100 INJECTION, SOLUTION INTRAVENOUS; SUBCUTANEOUS at 12:34

## 2020-01-11 RX ADMIN — ARIPIPRAZOLE 10 MG: 10 TABLET ORAL at 08:44

## 2020-01-11 RX ADMIN — BENZTROPINE MESYLATE 1 MG: 1 TABLET ORAL at 08:44

## 2020-01-11 ASSESSMENT — PAIN DESCRIPTION - PAIN TYPE
TYPE: CHRONIC PAIN

## 2020-01-11 ASSESSMENT — PAIN DESCRIPTION - LOCATION
LOCATION: BACK

## 2020-01-11 ASSESSMENT — PAIN SCALES - GENERAL
PAINLEVEL_OUTOF10: 7
PAINLEVEL_OUTOF10: 6
PAINLEVEL_OUTOF10: 5

## 2020-01-11 NOTE — PROGRESS NOTES
Inpatient Family Medicine Progress Note    PCP: Justin Rainey PA-C    Date of Admission: 1/10/2020    Chief Complaint: SOB    Hospital Course: 62 y.o. female with a PMH of CAD, HTN, CHF, diabetes who presented to [facility] with leg swelling and SOB. SOB started yesterday and is worse with exertion. Endorses leg swelling at baseline but symptoms are much worse now. Normally takes torsemide once daily but recently has taken it twice daily. Also reports that some erythema has developed on her left foot. Denies fevers, chills, chest pain. Of note, patient was recently admitted approximately 2 weeks ago for cellulitis of her foot.     In the ED, labs showed WBC 13.2, potassium 3.3, magnesium 1.5, troponin 0.02, proBNP 2534. Lactate normal. CXR negative. Given clindamycin x1. Admitted for further evaluation. During this admission, patient was continued on clindamycin. Started Lasix infusion for 24 hours. Subjective: Patient reports feeling significantly better than yesterday with weight loss of 1 lb and UOP of almost 2L. Endorses improvement in LE swelling. Denies fevers, nausea, vomiting, chest pain, abdominal pain.     Medications:  Reviewed    Infusion Medications    dextrose      furosemide (LASIX) 1mg/ml infusion 5 mg/hr (01/10/20 1523)     Scheduled Medications    sodium chloride flush  10 mL Intravenous 2 times per day    enoxaparin  40 mg Subcutaneous Daily    ARIPiprazole  10 mg Oral Daily    aspirin EC  81 mg Oral Daily    atorvastatin  80 mg Oral Nightly    benztropine  1 mg Oral Daily    buprenorphine-naloxone  1 Film Sublingual BID    carvedilol  3.125 mg Oral BID WC    clopidogrel  75 mg Oral Daily    insulin glargine  60 Units Subcutaneous Nightly    lamoTRIgine  150 mg Oral BID    magnesium oxide  400 mg Oral Daily    pantoprazole  40 mg Oral BID    potassium chloride  40 mEq Oral TID     sacubitril-valsartan  0.5 tablet Oral QPM    tiotropium  1 capsule Inhalation 01/10/20  0125   WBC 13.2*   HGB 10.2*   HCT 31.7*        Recent Labs     01/10/20  0125   *   K 3.3*   CL 85*   CO2 32   BUN 55*   CREATININE 2.1*   CALCIUM 10.0     Recent Labs     01/10/20  0125   AST 13*   ALT <5*   BILITOT <0.2   ALKPHOS 89     No results for input(s): INR in the last 72 hours. Recent Labs     01/10/20  0125   TROPONINI 0.02*     Urinalysis:      Lab Results   Component Value Date    NITRU Negative 12/25/2019    WBCUA 3-5 09/28/2019    BACTERIA Rare 09/28/2019    RBCUA None seen 09/28/2019    BLOODU Negative 12/25/2019    SPECGRAV <=1.005 12/25/2019    GLUCOSEU Negative 12/25/2019     Radiology:  NM LUNG VENT/PERFUSION (VQ)   Final Result   Low probability for pulmonary embolism. Obstructive lung disease.            Assessment/Plan:  Active Hospital Problems    Diagnosis Date Noted    Acute on chronic systolic CHF (congestive heart failure) ([facility]) [I50.23] 01/10/2020    Cellulitis of foot, left [L03.116] 01/10/2020    Acute kidney injury ([facility] [address] [N17.9] 12/25/2019    Hypotension [I95.9] 05/23/2018    Diabetes mellitus type 2 in obese ([facility] [address] [E11.69, E66.9] 12/10/2015     Cellulitis of left foot, clinically unable to determine relation to diabetes  - Switch clindamycin PO to IV  - Monitor WBC  - Continue probiotic     Acute on chronic systolic heart failure  - Last echo 4/3/19 at improved EF 40-45%  - Continue aldactone  - Lasix infusion 5mg/hr x24 hours  - CHF RN consulted  - Monitor telemetry  - Serial BMPs  - Daily weights  - Strict I/Os  - Oxygen therapy protocol     SHAUNNA on CKD  - Baseline 1.3  - Monitor electrolytes  - Avoid nephrotoxic agents     Hypotension, asymptomatic  - Per patient, baseline is on low end     Diabetes mellitus type 2  - Last A1c 12/3/19 at 7.3  - Humalog QAC/QHS  - High dose SSI  - POCT glucose  - Hypoglycemia protocol     CAD  - Continue home medication    DVT Prophylaxis: Lovenox  Diet: DIET CARB CONTROL; Carb Control: 4 carb choices (60

## 2020-01-11 NOTE — PLAN OF CARE
Patient's EF (Ejection Fraction) is greater than 40%    Heart Failure Medications:  Diuretics[de-identified] Furosemide and Spironolactone  ACE[de-identified] None  ARB[de-identified] None  ARNI[de-identified] None  Evidenced Based Beta Blocker[de-identified] Carvedilol- Coreg    Patient's weights and intake/output reviewed: Yes    Patient's Last Weight: 242 lbs obtained by standing scale. Difference of 1 lbs less than last documented weight. Intake/Output Summary (Last 24 hours) at 1/11/2020 1816  Last data filed at 1/11/2020 1736  Gross per 24 hour   Intake 935.08 ml   Output 850 ml   Net 85.08 ml       Comorbidities Reviewed Yes    Patient has a past medical history of Anxiety and depression, CAD (coronary artery disease), Cardiomyopathy ([address] CHF (congestive heart failure) ([address] COPD (chronic obstructive pulmonary disease) ([address] Diabetes mellitus ([address] GERD (gastroesophageal reflux disease), Hyperlipidemia, Hypertension, Hypotension, MI (myocardial infarction) ([address] Peripheral neuropathy, Peripheral vascular disease ([address] Pulmonary HTN ([address] Reflux, Sleep apnea, and Wears glasses. >>For CHF and Comorbidity documentation on Education Time and Topics, please see Education Tab    Patient stated Daily Functional Goal: to decrease leg swelling. Pt up in chair at this time on room air. Pt denies shortness of breath. Pt with pitting lower extremity edema.      Patient and/or Family's stated Goal of Care this Admission: reduce shortness of breath and reduce lower extremity edema prior to discharge

## 2020-01-11 NOTE — PLAN OF CARE
Problem: OXYGENATION/RESPIRATORY FUNCTION  Goal: Patient will maintain patent airway  1/11/2020 1822 by Teresita Barron RN  Outcome: Ongoing   Monitor oxygen level every shift and prn. Currently on room air. Problem: FLUID AND ELECTROLYTE IMBALANCE  Goal: Fluid and electrolyte balance are achieved/maintained  Outcome: Ongoing   Monitor intake and output every shift. Problem: Serum Glucose Level - Abnormal:  Goal: Ability to maintain appropriate glucose levels will improve  Description  Ability to maintain appropriate glucose levels will improve  Outcome: Ongoing  Monitor blood sugar QID and give SSI .

## 2020-01-12 VITALS
TEMPERATURE: 97.8 F | DIASTOLIC BLOOD PRESSURE: 67 MMHG | OXYGEN SATURATION: 96 % | SYSTOLIC BLOOD PRESSURE: 97 MMHG | HEART RATE: 88 BPM | WEIGHT: 248.6 LBS | RESPIRATION RATE: 16 BRPM | BODY MASS INDEX: 42.67 KG/M2

## 2020-01-12 LAB
ANION GAP SERPL CALCULATED.3IONS-SCNC: 15 MMOL/L (ref 3–16)
BASOPHILS ABSOLUTE: 0 K/UL (ref 0–0.2)
BASOPHILS RELATIVE PERCENT: 0.4 %
BUN BLDV-MCNC: 78 MG/DL (ref 7–20)
CALCIUM SERPL-MCNC: 8.8 MG/DL (ref 8.3–10.6)
CHLORIDE BLD-SCNC: 88 MMOL/L (ref 99–110)
CO2: 26 MMOL/L (ref 21–32)
CREAT SERPL-MCNC: 3.2 MG/DL (ref 0.6–1.1)
EOSINOPHILS ABSOLUTE: 0.3 K/UL (ref 0–0.6)
EOSINOPHILS RELATIVE PERCENT: 2.7 %
GFR AFRICAN AMERICAN: 18
GFR NON-AFRICAN AMERICAN: 15
GLUCOSE BLD-MCNC: 173 MG/DL (ref 70–99)
GLUCOSE BLD-MCNC: 183 MG/DL (ref 70–99)
GLUCOSE BLD-MCNC: 185 MG/DL (ref 70–99)
HCT VFR BLD CALC: 29.9 % (ref 36–48)
HEMOGLOBIN: 10 G/DL (ref 12–16)
LYMPHOCYTES ABSOLUTE: 1.3 K/UL (ref 1–5.1)
LYMPHOCYTES RELATIVE PERCENT: 12.6 %
MAGNESIUM: 2.4 MG/DL (ref 1.8–2.4)
MCH RBC QN AUTO: 29.9 PG (ref 26–34)
MCHC RBC AUTO-ENTMCNC: 33.6 G/DL (ref 31–36)
MCV RBC AUTO: 88.9 FL (ref 80–100)
MONOCYTES ABSOLUTE: 0.7 K/UL (ref 0–1.3)
MONOCYTES RELATIVE PERCENT: 6.5 %
NEUTROPHILS ABSOLUTE: 8.3 K/UL (ref 1.7–7.7)
NEUTROPHILS RELATIVE PERCENT: 77.8 %
PDW BLD-RTO: 15.7 % (ref 12.4–15.4)
PERFORMED ON: ABNORMAL
PERFORMED ON: ABNORMAL
PLATELET # BLD: 353 K/UL (ref 135–450)
PMV BLD AUTO: 8.8 FL (ref 5–10.5)
POTASSIUM SERPL-SCNC: 5.3 MMOL/L (ref 3.5–5.1)
RBC # BLD: 3.36 M/UL (ref 4–5.2)
SODIUM BLD-SCNC: 129 MMOL/L (ref 136–145)
WBC # BLD: 10.6 K/UL (ref 4–11)

## 2020-01-12 PROCEDURE — 94640 AIRWAY INHALATION TREATMENT: CPT

## 2020-01-12 PROCEDURE — 85025 COMPLETE CBC W/AUTO DIFF WBC: CPT

## 2020-01-12 PROCEDURE — 2500000003 HC RX 250 WO HCPCS: Performed by: STUDENT IN AN ORGANIZED HEALTH CARE EDUCATION/TRAINING PROGRAM

## 2020-01-12 PROCEDURE — 6360000002 HC RX W HCPCS: Performed by: STUDENT IN AN ORGANIZED HEALTH CARE EDUCATION/TRAINING PROGRAM

## 2020-01-12 PROCEDURE — 80048 BASIC METABOLIC PNL TOTAL CA: CPT

## 2020-01-12 PROCEDURE — 6370000000 HC RX 637 (ALT 250 FOR IP): Performed by: FAMILY MEDICINE

## 2020-01-12 PROCEDURE — 2580000003 HC RX 258: Performed by: FAMILY MEDICINE

## 2020-01-12 PROCEDURE — 6370000000 HC RX 637 (ALT 250 FOR IP): Performed by: STUDENT IN AN ORGANIZED HEALTH CARE EDUCATION/TRAINING PROGRAM

## 2020-01-12 PROCEDURE — 2580000003 HC RX 258

## 2020-01-12 PROCEDURE — 6360000002 HC RX W HCPCS: Performed by: FAMILY MEDICINE

## 2020-01-12 PROCEDURE — 2580000003 HC RX 258: Performed by: STUDENT IN AN ORGANIZED HEALTH CARE EDUCATION/TRAINING PROGRAM

## 2020-01-12 PROCEDURE — 36415 COLL VENOUS BLD VENIPUNCTURE: CPT

## 2020-01-12 PROCEDURE — 83735 ASSAY OF MAGNESIUM: CPT

## 2020-01-12 PROCEDURE — 6360000002 HC RX W HCPCS: Performed by: HOSPITALIST

## 2020-01-12 RX ORDER — SODIUM CHLORIDE 9 MG/ML
INJECTION, SOLUTION INTRAVENOUS
Status: COMPLETED
Start: 2020-01-12 | End: 2020-01-12

## 2020-01-12 RX ORDER — CLINDAMYCIN HYDROCHLORIDE 300 MG/1
300 CAPSULE ORAL 4 TIMES DAILY
Qty: 20 CAPSULE | Refills: 0 | Status: ON HOLD | OUTPATIENT
Start: 2020-01-12 | End: 2020-01-31 | Stop reason: CLARIF

## 2020-01-12 RX ORDER — SACCHAROMYCES BOULARDII 250 MG
250 CAPSULE ORAL 2 TIMES DAILY
Qty: 14 CAPSULE | Refills: 0 | Status: ON HOLD | OUTPATIENT
Start: 2020-01-12 | End: 2020-01-31 | Stop reason: CLARIF

## 2020-01-12 RX ADMIN — INSULIN LISPRO 1 UNITS: 100 INJECTION, SOLUTION INTRAVENOUS; SUBCUTANEOUS at 08:53

## 2020-01-12 RX ADMIN — Medication 10 ML: at 08:52

## 2020-01-12 RX ADMIN — ASPIRIN 81 MG: 81 TABLET, COATED ORAL at 08:51

## 2020-01-12 RX ADMIN — CLOPIDOGREL BISULFATE 75 MG: 75 TABLET ORAL at 08:52

## 2020-01-12 RX ADMIN — LAMOTRIGINE 150 MG: 25 TABLET ORAL at 08:51

## 2020-01-12 RX ADMIN — BUSPIRONE HYDROCHLORIDE 30 MG: 5 TABLET ORAL at 08:51

## 2020-01-12 RX ADMIN — BUPRENORPHINE HYDROCHLORIDE, NALOXONE HYDROCHLORIDE 1 FILM: 8; 2 FILM, SOLUBLE BUCCAL; SUBLINGUAL at 08:52

## 2020-01-12 RX ADMIN — INSULIN LISPRO 1 UNITS: 100 INJECTION, SOLUTION INTRAVENOUS; SUBCUTANEOUS at 12:35

## 2020-01-12 RX ADMIN — Medication 250 MG: at 08:52

## 2020-01-12 RX ADMIN — PANTOPRAZOLE SODIUM 40 MG: 40 TABLET, DELAYED RELEASE ORAL at 08:52

## 2020-01-12 RX ADMIN — ACETAMINOPHEN 650 MG: 325 TABLET ORAL at 04:49

## 2020-01-12 RX ADMIN — ACETAMINOPHEN 650 MG: 325 TABLET ORAL at 10:26

## 2020-01-12 RX ADMIN — SODIUM CHLORIDE 250 ML: 9 INJECTION, SOLUTION INTRAVENOUS at 02:04

## 2020-01-12 RX ADMIN — DEXTROSE MONOHYDRATE 600 MG: 50 INJECTION, SOLUTION INTRAVENOUS at 02:01

## 2020-01-12 RX ADMIN — FUROSEMIDE 5 MG/HR: 10 INJECTION, SOLUTION INTRAMUSCULAR; INTRAVENOUS at 04:49

## 2020-01-12 RX ADMIN — BENZTROPINE MESYLATE 1 MG: 1 TABLET ORAL at 08:51

## 2020-01-12 RX ADMIN — CARVEDILOL 3.12 MG: 3.12 TABLET, FILM COATED ORAL at 08:52

## 2020-01-12 RX ADMIN — ARIPIPRAZOLE 10 MG: 10 TABLET ORAL at 08:51

## 2020-01-12 RX ADMIN — MAGNESIUM GLUCONATE 500 MG ORAL TABLET 400 MG: 500 TABLET ORAL at 08:52

## 2020-01-12 RX ADMIN — TIOTROPIUM BROMIDE 18 MCG: 18 CAPSULE ORAL; RESPIRATORY (INHALATION) at 08:31

## 2020-01-12 RX ADMIN — HEPARIN SODIUM 5000 UNITS: 5000 INJECTION INTRAVENOUS; SUBCUTANEOUS at 04:55

## 2020-01-12 RX ADMIN — DEXTROSE MONOHYDRATE 600 MG: 50 INJECTION, SOLUTION INTRAVENOUS at 10:14

## 2020-01-12 ASSESSMENT — PAIN SCALES - GENERAL: PAINLEVEL_OUTOF10: 3

## 2020-01-12 NOTE — PROGRESS NOTES
40 mg Oral BID    tiotropium  1 capsule Inhalation Daily    insulin lispro  0-6 Units Subcutaneous TID WC    insulin lispro  0-3 Units Subcutaneous Nightly    busPIRone  30 mg Oral BID    saccharomyces boulardii  250 mg Oral BID     PRN Meds: acetaminophen, sodium chloride flush, magnesium hydroxide, ondansetron, perflutren lipid microspheres, albuterol sulfate HFA, glucose, dextrose, glucagon (rDNA), dextrose      Intake/Output Summary (Last 24 hours) at 1/12/2020 1149  Last data filed at 1/12/2020 1016  Gross per 24 hour   Intake 1449.08 ml   Output 2050 ml   Net -600.92 ml       Physical Exam Performed:    BP 94/63   Pulse 86   Temp 97.7 °F (36.5 °C) (Oral)   Resp 16   Wt 248 lb 9.6 oz (112.8 kg)   LMP 10/24/2012   SpO2 97%   BMI 42.67 kg/m²     General appearance:  NAD, appears stated age, and cooperative. HEENT:  Normal cephalic, atraumatic without obvious deformity. Extra ocular muscles intact. Conjunctivae clear. Neck: Supple. No jugular venous distention. Trachea midline. Respiratory:  Normal respiratory effort. CTABL, without Rales/Wheezes/Rhonchi. Cardiovascular:  Regular rate and rhythm with normal S1/S2 without murmurs, rubs or gallops. Abdomen: +BS, non-distended, soft, non-tender  Musculoskeletal:  No clubbing, cyanosis. 2+ pitting edema BLLE. Skin: Left dorsal foot with erythema continuing to improve. Small bulla noted on distal second left toe. 1cm ulcer on lateral plantar surface of right foot.  Superficial ulcer on plantar surface of 3rd left distal toe.   Neurologic:  Cranial nerves: II-XII intact, no focal deficits, no gross sensory,motor deficits  Psychiatric:  Alert and oriented, thought content appropriate, normal insight        Labs:   Recent Labs     01/10/20  0125 01/11/20  0728 01/12/20  0721   WBC 13.2* 13.3* 10.6   HGB 10.2* 9.1* 10.0*   HCT 31.7* 27.2* 29.9*    324 353     Recent Labs     01/10/20  0125 01/11/20  0728 01/12/20  0721   * 132* 129*   K 3. 3* 4.6 5.3*   CL 85* 89* 88*   CO2 32 30 26   BUN 55* 67* 78*   CREATININE 2.1* 2.5* 3.2*   CALCIUM 10.0 9.1 8.8     Recent Labs     01/10/20  0125   AST 13*   ALT <5*   BILITOT <0.2   ALKPHOS 89     No results for input(s): INR in the last 72 hours. Recent Labs     01/10/20  0125   TROPONINI 0.02*       Urinalysis:      Lab Results   Component Value Date    NITRU Negative 12/25/2019    WBCUA 3-5 09/28/2019    BACTERIA Rare 09/28/2019    RBCUA None seen 09/28/2019    BLOODU Negative 12/25/2019    SPECGRAV <=1.005 12/25/2019    GLUCOSEU Negative 12/25/2019       Radiology:  NM LUNG VENT/PERFUSION (VQ)   Final Result   Low probability for pulmonary embolism. Obstructive lung disease.                  Assessment/Plan:    Active Hospital Problems    Diagnosis Date Noted    Acute on chronic systolic CHF (congestive heart failure) ([facility]) [I50.23] 01/10/2020    Cellulitis of foot, left [L03.116] 01/10/2020    Acute kidney injury ([facility] [address] [N17.9] 12/25/2019    Hypotension [I95.9] 05/23/2018    Diabetes mellitus type 2 in obese ([facility] [address] [E11.69, E66.9] 12/10/2015     Cellulitis of left foot  - Hx of DM, CAD, PAD, follows with podiatry, CHF nurse, vascular surgery as outpt  - Switch clindamycin PO to IV  - Monitor WBC  - Continue probiotic     Acute on chronic systolic heart failure  - Last echo 4/3/19 at improved EF 40-45%  - Continue aldactone  - Lasix infusion 5mg/hr x24 hours, stopped  - CHF RN consulted  - Monitor telemetry  - Serial BMPs  - Daily weights  - Strict I/Os  - Oxygen therapy protocol     Acute on Chronic KD  - Follows with nephrology outpt  - Baseline 1.3, currently 3.2  - Monitor electrolytes  - Avoid nephrotoxic agents     Hypotension, asymptomatic  - Per patient, at her baseline now,  baseline is on low end     Diabetes mellitus type 2  - Last A1c 12/3/19 at 7.3  - Humalog QAC/QHS  - High dose SSI  - POCT glucose  - Hypoglycemia protocol     CAD  - Continue home medication       DVT Prophylaxis:

## 2020-01-12 NOTE — PLAN OF CARE
Problem: OXYGENATION/RESPIRATORY FUNCTION  Goal: Patient will maintain patent airway  1/12/2020 0037 by John Berrios RN  Outcome: Ongoing  Note:     Patient's EF (Ejection Fraction) is greater than 40%    Heart Failure Medications:  Diuretics[de-identified] Furosemide and Spironolactone  ACE[de-identified] None  ARB[de-identified] None  ARNI[de-identified] None  Evidenced Based Beta Blocker[de-identified] Carvedilol- Coreg    Patient's weights and intake/output reviewed: Yes    Patient's Last Weight: 242 lbs obtained by standing scale. Difference of 1 lbs less than last documented weight. Intake/Output Summary (Last 24 hours) at 1/12/2020 0038  Last data filed at 1/11/2020 2135  Gross per 24 hour   Intake 1415.08 ml   Output 750 ml   Net 665.08 ml       Comorbidities Reviewed Yes    Patient has a past medical history of Anxiety and depression, CAD (coronary artery disease), Cardiomyopathy ([address] CHF (congestive heart failure) ([address] COPD (chronic obstructive pulmonary disease) ([address] Diabetes mellitus ([address] GERD (gastroesophageal reflux disease), Hyperlipidemia, Hypertension, Hypotension, MI (myocardial infarction) ([address] Peripheral neuropathy, Peripheral vascular disease ([address] Pulmonary HTN ([address] Reflux, Sleep apnea, and Wears glasses. >>For CHF and Comorbidity documentation on Education Time and Topics, please see Education Tab    Patient stated Daily Functional Goal: (Note:help the patient identify a challenging but achievable goal per shift that can aid in progression towards increased functional capacity at discharge ie sit in the chair for x amount of time, Decrease walk time by x seconds, stand or walk with minimal assistance, decrease pain to a level of x/10, etc) Patient will increase knowledge and understanding of congestive heart failure and the indications of their diagnosis. Patient will continue to increase activity as tolerated. Pt resting in bed at this time on room air. Pt denies shortness of breath. Pt with pitting lower extremity edema.

## 2020-01-12 NOTE — DISCHARGE SUMMARY
Inpatient Family Medicine Service Discharge Summary      Patient ID: Gayatri Garcia    Patient's PCP: Miles Coe PA-C    Admit Date: 1/10/2020   Discharge Date:   1/12/2020    Admitting Physician: Micheline Mcelroy DO  Discharge Physician: Kartik Munoz DO     Discharge Diagnoses: Active Hospital Problems    Diagnosis Date Noted    Acute on chronic systolic CHF (congestive heart failure) ([facility]) [I50.23] 01/10/2020    Cellulitis of foot, left [L03.116] 01/10/2020    Acute kidney injury ([facility] [address] [N17.9] 12/25/2019    Cellulitis [L03.90] 06/01/2019    Hypotension [I95.9] 05/23/2018    Diabetes mellitus type 2 in obese ([facility] [address] [E11.69, E66.9] 12/10/2015       The patient was seen and examined on day of discharge and this discharge summary is in conjunction with any daily progress note from day of discharge. Hospital Course: 62 y.o. female with a PMH of CAD, HTN, CHF, diabetes who presented to [facility] with leg swelling and SOB. SOB started yesterday and is worse with exertion. Endorses leg swelling at baseline but symptoms are much worse now. Normally takes torsemide once daily but recently has taken it twice daily. Also reports that some erythema has developed on her left foot. Denies fevers, chills, chest pain. Of note, patient was recently admitted approximately 2 weeks ago for cellulitis of her foot.     In the ED, labs showed WBC 13.2, potassium 3.3, magnesium 1.5, troponin 0.02, proBNP 2534. Lactate normal. CXR negative. Given clindamycin x1. Admitted for further evaluation.      During this admission, patient was continued on clindamycin. Started Lasix infusion for 24 hours. Once her symptoms improved and pt was in stable condition she was discharged home on abx and instructed to keep her current appointments with podiatry, PCP, CHF nurse, and nephro. Also instructed to have her previously planned LE doppler completed and follow up with vascular surgery.          Physical Exam Performed:     BP 97/67   Pulse 88   Temp 97.8 °F (36.6 °C) (Oral)   Resp 16   Wt 248 lb 9.6 oz (112.8 kg)   LMP 10/24/2012   SpO2 96%   BMI 42.67 kg/m²       General appearance:  NAD, appears stated age, and cooperative. HEENT:  Normal cephalic, atraumatic without obvious deformity. Extra ocular muscles intact. Conjunctivae clear. Neck: Supple. No jugular venous distention. Trachea midline. Respiratory:  Normal respiratory effort. CTABL, without Rales/Wheezes/Rhonchi. Cardiovascular:  Regular rate and rhythm with normal S1/S2 without murmurs, rubs or gallops. Abdomen: +BS, non-distended, soft, non-tender  Musculoskeletal:  No clubbing, cyanosis. 2+ pitting edema BLLE. Skin: Left dorsal foot with erythema continuing to improve. Small bulla noted on distal second left toe. 1cm ulcer on lateral plantar surface of right foot. Superficial ulcer on plantar surface of 3rd left distal toe.   Neurologic:  Cranial nerves: II-XII intact, no focal deficits, no gross sensory,motor deficits  Psychiatric:  Alert and oriented, thought content appropriate, normal insight      Labs: For convenience and continuity at follow-up the following most recent labs are provided:      CBC:    Lab Results   Component Value Date    WBC 10.6 01/12/2020    HGB 10.0 01/12/2020    HCT 29.9 01/12/2020     01/12/2020       Renal:    Lab Results   Component Value Date     01/12/2020    K 5.3 01/12/2020    K 3.3 01/10/2020    CL 88 01/12/2020    CO2 26 01/12/2020    BUN 78 01/12/2020    CREATININE 3.2 01/12/2020    CALCIUM 8.8 01/12/2020    PHOS 3.9 10/08/2019         Significant Diagnostic Studies    Radiology:   NM LUNG VENT/PERFUSION (VQ)   Final Result   Low probability for pulmonary embolism. Obstructive lung disease.                 Consults:     IP CONSULT TO HEART FAILURE NURSE/COORDINATOR  IP CONSULT TO DIETITIAN    Disposition:  Discharged home in stable condition    Condition at Discharge: Stable    Discharge Instructions/Follow-up:  Please stop the following medications for 3 days. May restart them on Thursday 1/16/2020.  -Freddgeovanik Dewayneack  -Metolazone  -spironolactone  -Torsemide  -Potassium    Please complete antibiotic course. Please have your PCP Tierra Juárez follow up on your SHAUNNA and recheck your creatinine. Follow up with the CHF nurse, podiatry, vascular surgery, and nephrology as scheduled. Code Status:  Full Code     Activity: activity as tolerated    Diet: Diabetic diet, low sodium, restricted fluids 2000 cc or less      Discharge Medications:     Current Discharge Medication List           Details   nystatin (MYCOSTATIN) 419894 UNIT/GM cream       benztropine (COGENTIN) 1 MG tablet       blood glucose test strips (EXACTECH TEST) strip 6 times daily.   Qty: 200 strip, Refills: 6    Associated Diagnoses: Diabetes mellitus type 2 in obese ([facility])      Insulin Pen Needle (B-D ULTRAFINE III SHORT PEN) 31G X 8 MM MISC Five shots a day  Qty: 200 each, Refills: 2    Associated Diagnoses: Diabetes mellitus type 2 in obese ([facility])      INSULIN SYRINGE .5CC/29G 29G X 1/2\" 0.5 ML MISC 1 each by Does not apply route daily  Qty: 100 each, Refills: 3    Associated Diagnoses: Diabetes mellitus type 2 in obese ([facility])      Insulin Degludec (TRESIBA FLEXTOUCH) 100 UNIT/ML SOPN Inject 100 Units into the skin nightly  Qty: 10 pen, Refills: 5    Associated Diagnoses: Diabetes mellitus type 2 in obese ([facility])      carvedilol (COREG) 3.125 MG tablet TAKE 1 TABLET BY MOUTH TWICE A DAY WITH MEALS  Qty: 60 tablet, Refills: 5      Liraglutide (VICTOZA) 18 MG/3ML SOPN SC injection Inject 1.2 mg into the skin daily  Qty: 2 pen, Refills: 3    Associated Diagnoses: Type 2 diabetes mellitus with stage 3 chronic kidney disease, with long-term current use of insulin ([facility])      vitamin D (ERGOCALCIFEROL) 1.25 MG (59437 UT) CAPS capsule Take 1 capsule by mouth once a week  Qty: 5 capsule, Refills: 5    Associated Diagnoses: Vitamin D deficiency daily       lamoTRIgine (LAMICTAL) 150 MG tablet Take 150 mg by mouth 2 times daily              Time Spent on discharge is more than 45 minutes in the examination, evaluation, counseling and review of medications and discharge plan. This patient was seen and evaluated with resident team and the attending, Dr. Brennan Graham. Signed:    Harjeet Flynn DO   1/12/2020  Family Medicine Resident PGY2    Thank you Eleazar Ceron PA-C for the opportunity to be involved in this patient's care. If you have any questions or concerns please feel free to contact me at [phone].

## 2020-01-12 NOTE — PROGRESS NOTES
Patient given discharge instructions and prescriptions. Voiced understanding. To car via wheelchair. Home with all belongings with boyfriend.  MARGARITA,ER

## 2020-01-12 NOTE — PROGRESS NOTES
BP (!) 67/49   Pulse 76   Temp 97.7 °F (36.5 °C) (Oral)   Resp 16   Wt 242 lb (109.8 kg)   LMP 10/24/2012   SpO2 97%   BMI 41.54 kg/m²      Day shift nurse reported that pts blood pressure runs low, MD aware. Pt. Does not feel dizzy or light headed, but does report to feeling sleepy. Will recheck blood pressure.

## 2020-01-13 ENCOUNTER — FOLLOWUP TELEPHONE ENCOUNTER (OUTPATIENT)
Dept: ADMINISTRATIVE | Age: 55
End: 2020-01-13

## 2020-01-13 NOTE — PLAN OF CARE
Problem: OXYGENATION/RESPIRATORY FUNCTION  Goal: Patient will maintain patent airway  Outcome: Ongoing   Monitor oxygen levels. On room air. Problem: FLUID AND ELECTROLYTE IMBALANCE  Goal: Fluid and electrolyte balance are achieved/maintained  Outcome: Ongoing   Monitor intake and output every shift    Problem: Serum Glucose Level - Abnormal:  Goal: Ability to maintain appropriate glucose levels will improve  Description  Ability to maintain appropriate glucose levels will improve  Outcome: Ongoing   Monitor blood sugars and give SSI prn.

## 2020-01-13 NOTE — TELEPHONE ENCOUNTER
1st Attempt; HIPAA compliant VM message left with non urgent CHF nurse call back number as resource. Care transition faxed to Rodney Santos PA-C. Care transition included reason for hospitalization, procedures performed during hospitalization, services provided during hospitalization, discharge medications, and follow-up treatment and services needed.

## 2020-01-14 ENCOUNTER — HOSPITAL ENCOUNTER (OUTPATIENT)
Dept: VASCULAR LAB | Age: 55
Discharge: HOME | End: 2020-01-14
Payer: COMMERCIAL

## 2020-01-14 ENCOUNTER — TELEPHONE (OUTPATIENT)
Dept: CARDIOLOGY CLINIC | Age: 55
End: 2020-01-14

## 2020-01-14 LAB
BLOOD CULTURE, ROUTINE: NORMAL
CULTURE, BLOOD 2: NORMAL

## 2020-01-14 PROCEDURE — 93925 LOWER EXTREMITY STUDY: CPT

## 2020-01-15 NOTE — TELEPHONE ENCOUNTER
3rd Attempt; HIPAA compliant VM message unable to be left. Pt listed home number does not have VM set up. Noted patient's contact to [facility] cardiology office. Encounter closed at this time.

## 2020-01-16 ENCOUNTER — TELEPHONE (OUTPATIENT)
Dept: CARDIOLOGY CLINIC | Age: 55
End: 2020-01-16

## 2020-01-16 ENCOUNTER — HOSPITAL ENCOUNTER (OUTPATIENT)
Age: 55
Discharge: HOME | End: 2020-01-16
Payer: COMMERCIAL

## 2020-01-16 ENCOUNTER — OFFICE VISIT (OUTPATIENT)
Dept: CARDIOLOGY CLINIC | Age: 55
End: 2020-01-16
Payer: COMMERCIAL

## 2020-01-16 VITALS
WEIGHT: 251 LBS | HEIGHT: 65 IN | DIASTOLIC BLOOD PRESSURE: 40 MMHG | HEART RATE: 80 BPM | BODY MASS INDEX: 41.82 KG/M2 | SYSTOLIC BLOOD PRESSURE: 98 MMHG | OXYGEN SATURATION: 97 %

## 2020-01-16 LAB
ANION GAP SERPL CALCULATED.3IONS-SCNC: 13 MMOL/L (ref 3–16)
BUN BLDV-MCNC: 52 MG/DL (ref 7–20)
CALCIUM SERPL-MCNC: 9.2 MG/DL (ref 8.3–10.6)
CHLORIDE BLD-SCNC: 93 MMOL/L (ref 99–110)
CO2: 27 MMOL/L (ref 21–32)
CREAT SERPL-MCNC: 1.8 MG/DL (ref 0.6–1.1)
GFR AFRICAN AMERICAN: 35
GFR NON-AFRICAN AMERICAN: 29
GLUCOSE BLD-MCNC: 109 MG/DL (ref 70–99)
POTASSIUM SERPL-SCNC: 3.7 MMOL/L (ref 3.5–5.1)
PRO-BNP: 5946 PG/ML (ref 0–124)
SODIUM BLD-SCNC: 133 MMOL/L (ref 136–145)

## 2020-01-16 PROCEDURE — 2022F DILAT RTA XM EVC RTNOPTHY: CPT | Performed by: NURSE PRACTITIONER

## 2020-01-16 PROCEDURE — 99214 OFFICE O/P EST MOD 30 MIN: CPT | Performed by: NURSE PRACTITIONER

## 2020-01-16 PROCEDURE — 80048 BASIC METABOLIC PNL TOTAL CA: CPT

## 2020-01-16 PROCEDURE — 1111F DSCHRG MED/CURRENT MED MERGE: CPT | Performed by: NURSE PRACTITIONER

## 2020-01-16 PROCEDURE — 1036F TOBACCO NON-USER: CPT | Performed by: NURSE PRACTITIONER

## 2020-01-16 PROCEDURE — 3046F HEMOGLOBIN A1C LEVEL >9.0%: CPT | Performed by: NURSE PRACTITIONER

## 2020-01-16 PROCEDURE — G8427 DOCREV CUR MEDS BY ELIG CLIN: HCPCS | Performed by: NURSE PRACTITIONER

## 2020-01-16 PROCEDURE — 83880 ASSAY OF NATRIURETIC PEPTIDE: CPT

## 2020-01-16 PROCEDURE — 3017F COLORECTAL CA SCREEN DOC REV: CPT | Performed by: NURSE PRACTITIONER

## 2020-01-16 PROCEDURE — G8417 CALC BMI ABV UP PARAM F/U: HCPCS | Performed by: NURSE PRACTITIONER

## 2020-01-16 PROCEDURE — 36415 COLL VENOUS BLD VENIPUNCTURE: CPT

## 2020-01-16 PROCEDURE — G8482 FLU IMMUNIZE ORDER/ADMIN: HCPCS | Performed by: NURSE PRACTITIONER

## 2020-01-16 RX ORDER — METOLAZONE 2.5 MG/1
2.5 TABLET ORAL DAILY
Qty: 30 TABLET | Refills: 3 | Status: ON HOLD | OUTPATIENT
Start: 2020-01-16 | End: 2020-01-31 | Stop reason: HOSPADM

## 2020-01-16 RX ORDER — TORSEMIDE 100 MG/1
100 TABLET ORAL 2 TIMES DAILY
Qty: 60 TABLET | Refills: 3 | Status: ON HOLD
Start: 2020-01-16 | End: 2020-01-31 | Stop reason: SDUPTHER

## 2020-01-16 RX ORDER — SPIRONOLACTONE 100 MG/1
100 TABLET, FILM COATED ORAL 2 TIMES DAILY
Qty: 60 TABLET | Refills: 3 | Status: ON HOLD
Start: 2020-01-16 | End: 2020-01-31 | Stop reason: SDUPTHER

## 2020-01-16 NOTE — TELEPHONE ENCOUNTER
Created telephone encounter. Spoke with Leticia Menard relayed message per [employer] regarding labs. Pt verbalized understanding. Pt was added onto schedule today. Lab orders placed in epic.

## 2020-01-16 NOTE — PROGRESS NOTES
[facility]   Cardiac Evaluation    Primary Care Doctor:  Deena Heaton    Chief Complaint   Patient presents with    Follow-up    Shortness of Breath    Fatigue    Dizziness    Edema    Chest Pain        History of Present Illness:   I had the pleasure of seeing Austine Romberg in follow up for recent hospitalization. Austine Romberg was recently hospitalized with LLE cellulitis and A/CKD. She had increased swelling and shortness of breath so was diuresed but developed worsening renal function. Creatinine peaked to 3.2 at which time she was discharged and instructed to hold her diuretics and Entresto. Repeat blood work today shows improvement in renal function and lytes but elevated BNP. She has hx of  CAD, MR, sCHF, ICM, HLD, HTN as well as PAD, DM, CLINT and smoker. She underwent placement of CardioMEMS on 6/49/30 without complication. Her CardioMEMs pressure waveforms were dampened and unreliable. The last 2 days however are good waveforms with elevated PAD. Her weight is up 25 lbs,. She has shortness of breath at rest.  + abdominal pain and distention, worse on right side. She restarted the torsemide, metolazone and spironolactone this am.  She is having headache, took ibuprofen 800 mg this am.  + dizziness. No palpitations. + orthopnea. Austine Romberg describes symptoms including dyspnea, orthopnea, PND, fatigue, early saiety, edema but denies chest pain, palpitations, syncope.      Past Medical History:   has a past medical history of Anxiety and depression, CAD (coronary artery disease), Cardiomyopathy ([address] CHF (congestive heart failure) ([address] COPD (chronic obstructive pulmonary disease) ([address] Diabetes mellitus ([address] GERD (gastroesophageal reflux disease), Hyperlipidemia, Hypertension, Hypotension, MI (myocardial infarction) ([address] Peripheral neuropathy, Peripheral vascular disease ([address] Pulmonary HTN ([address] Reflux, Sleep apnea, and Wears glasses. Surgical History:   has a past surgical history that includes Tonsillectomy; Cholecystectomy; tumor excision; Upper gastrointestinal endoscopy (4/25/2013); Upper gastrointestinal endoscopy (12/10/2015); Upper gastrointestinal endoscopy (01/06/2017); Arterial bypass surgry (Left, 01/06/2016); Cardiac catheterization (03/27/2018); Coronary angioplasty with stent (03/16/2018); Rotator cuff repair (Left, 04/22/2016); Coronary angioplasty with stent (01/03/2018); Insertable Cardiac Monitor (02/14/2019); femoral bypass (Right, 5/16/2019); and other surgical history (10/08/2019). Social History:   reports that she quit smoking about 5 months ago. Her smoking use included cigarettes. She has a 7.50 pack-year smoking history. She has never used smokeless tobacco. She reports that she does not drink alcohol or use drugs. Family History:   Family History   Problem Relation Age of Onset    Heart Disease Maternal Grandmother     Cancer Mother         lung and brain cancer    Cancer Maternal Aunt         lung and brain cancer       Home Medications:  Prior to Admission medications    Medication Sig Start Date End Date Taking? Authorizing Provider   clindamycin (CLEOCIN) 300 MG capsule Take 1 capsule by mouth 4 times daily for 5 days 1/12/20 1/17/20 Yes Beth Mcghee,    benztropine (COGENTIN) 1 MG tablet  12/13/19  Yes Historical Provider, MD   blood glucose test strips (EXACTECH TEST) strip 6 times daily.  12/19/19  Yes Drena Rumpf, APRN - CNP   Insulin Pen Needle (B-D ULTRAFINE III SHORT PEN) 31G X 8 MM MISC Five shots a day 12/3/19  Yes Drena Rumpf, APRN - CNP   INSULIN SYRINGE .5CC/29G 29G X 1/2\" 0.5 ML MISC 1 each by Does not apply route daily 12/3/19  Yes Drena Delpf APRN - CNP   Insulin Degludec (TRESIBA FLEXTOUCH) 100 UNIT/ML SOPN Inject 100 Units into the skin nightly 12/3/19  Yes Drena Rumpf, APRN - CNP   carvedilol (COREG) 3.125 MG tablet TAKE 1 TABLET BY MOUTH TWICE A DAY WITH MEALS 11/26/19  Yes TRAY Randhawa CNP   Liraglutide (VICTOZA) 18 MG/3ML SOPN SC injection Inject 1.2 mg into the skin daily 10/29/19  Yes TRAY Mccoy CNP   vitamin D (ERGOCALCIFEROL) 1.25 MG (39011 UT) CAPS capsule Take 1 capsule by mouth once a week 10/29/19  Yes TRAY Mccoy CNP   albuterol sulfate  (90 Base) MCG/ACT inhaler INHALE 2 PUFFS BY MOUTH EVERY 6 HOURS AS NEEDED FOR WHEEZING/SHORTNESS OF BREATH 10/7/19  Yes Karen Hurtado MD   pantoprazole (PROTONIX) 40 MG tablet Take 1 tablet by mouth 2 times daily 10/1/19  Yes Marshall Teran MD   buprenorphine-naloxone (SUBOXONE) 8-2 MG SUBL SL tablet PLACE 1 TABLET UNDER THE BERNARDO TWICE A DAY 9/17/19  Yes Historical Provider, MD   nitroGLYCERIN (NITROSTAT) 0.4 MG SL tablet PLEASE SEE ATTACHED FOR DETAILED DIRECTIONS 7/24/19  Yes Historical Provider, MD   clopidogrel (PLAVIX) 75 MG tablet TAKE 1 TABLET BY MOUTH EVERY DAY 9/3/19  Yes TRAY Randhawa CNP   SPIRIVA RESPIMAT 2.5 MCG/ACT AERS inhaler INHALE 2 PUFFS INTO THE LUNGS DAILY 7/15/19  Yes Kraen Hurtado MD   magnesium oxide (MAG-OX) 400 (240 Mg) MG tablet TAKE 1 TABLET ONCE DAILY 5/1/19  Yes Alma Delia Bhardwaj MD   insulin lispro (HUMALOG KWIKPEN) 100 UNIT/ML pen Inject 15 Units into the skin 3 times daily (before meals) 4/9/19  Yes Nhung Melendez MD   digoxin (LANOXIN) 125 MCG tablet Take 1 tablet by mouth daily 4/3/19  Yes TRAY Randhawa CNP   atorvastatin (LIPITOR) 80 MG tablet Take 80 mg by mouth nightly 5/2/18  Yes Historical Provider, MD   busPIRone (BUSPAR) 30 MG tablet Take 30 mg by mouth 2 times daily  4/2/18  Yes Historical Provider, MD   aspirin EC 81 MG EC tablet Take 1 tablet by mouth daily 1/8/16  Yes Karina Loving MD   ARIPiprazole (ABILIFY) 10 MG tablet Take 10 mg by mouth daily    Yes Historical Provider, MD   lamoTRIgine (LAMICTAL) 150 MG tablet Take 150 mg by mouth 2 times daily    Yes Historical Provider, MD   saccharomyces boulardii (FLORASTOR) 250 MG capsule Take 1 capsule by mouth 2 times daily for 7 days  Patient not taking: Reported on 1/16/2020 1/12/20 1/19/20  Beth Mcghee,    nystatin (MYCOSTATIN) 738923 UNIT/GM cream  12/19/19   Historical Provider, MD   Varenicline Tartrate (CHANTIX PO) Take by mouth    Historical Provider, MD        Allergies:  Lisinopril     Review of Systems:   · Constitutional: there has been no unanticipated weight loss. + weight gain  · Eyes: No vision changes  · ENT: + Headaches, no nasal congestion. No mouth sores or sore throat. · Cardiovascular: Reviewed in HPI  · Respiratory: + cough, no wheezing, no sputum production. · Gastrointestinal: No abdominal pain, no constipation or diarrhea, + abdominal distention  · Genitourinary: No dysuria, trouble voiding, or hematuria. · Musculoskeletal:  No weakness or joint complaints. · Integumentary: No rash or pruritis. · Neurological: No numbness or tingling. No weakness. No tremor. · Psychiatric: No anxiety, no depression. · Endocrine:  No excessive thirst or urination. · Hematologic/Lymphatic: No abnormal bruising or bleeding, blood clots or swollen lymph nodes. Physical Examination:    Vitals:    01/16/20 1424 01/16/20 1429   BP: (!) 98/40 (!) 98/40   Pulse: 80 80   SpO2: 97% 97%   Weight: 251 lb (113.9 kg)    Height: 5' 4.5\" (1.638 m)         Constitutional and General Appearance: Warm and dry, no apparent distress, normal coloration  HEENT:  Normocephalic, atraumatic  Respiratory:  · Normal excursion and expansion without use of accessory muscles  · Resp Auscultation: Normal breath sounds without dullness  Cardiovascular:  · The apical impulses not displaced  · Heart tones are crisp and normal  · JVP 10-12 cm H2O  · Regular rate and rhythm, normal S1S2, no m/r/g  · Peripheral pulses are symmetrical and full  · There is no clubbing, cyanosis of the extremities.   · 3+ BLE edema  · Pedal Pulses: 2+ and equal     Abdomen:  · No masses or tenderness, firm, distended, + HJR  · Liver/Spleen: No Abnormalities Noted  Neurological/Psychiatric:  · Alert and oriented in all spheres  · Moves all extremities well  · Exhibits normal gait balance and coordination  · No abnormalities of mood, affect, memory, mentation, or behavior are noted    Lab Data:  CBC:   Lab Results   Component Value Date    WBC 10.6 01/12/2020    WBC 13.3 01/11/2020    WBC 13.2 01/10/2020    RBC 3.36 01/12/2020    RBC 3.03 01/11/2020    RBC 3.53 01/10/2020    HGB 10.0 01/12/2020    HGB 9.1 01/11/2020    HGB 10.2 01/10/2020    HCT 29.9 01/12/2020    HCT 27.2 01/11/2020    HCT 31.7 01/10/2020    MCV 88.9 01/12/2020    MCV 89.8 01/11/2020    MCV 89.8 01/10/2020    RDW 15.7 01/12/2020    RDW 15.4 01/11/2020    RDW 15.2 01/10/2020     01/12/2020     01/11/2020     01/10/2020     BMP:  Lab Results   Component Value Date     01/16/2020     01/12/2020     01/11/2020    K 3.7 01/16/2020    K 5.3 01/12/2020    K 4.6 01/11/2020    K 3.3 01/10/2020    K 4.5 12/28/2019    K 4.7 12/25/2019    CL 93 01/16/2020    CL 88 01/12/2020    CL 89 01/11/2020    CO2 27 01/16/2020    CO2 26 01/12/2020    CO2 30 01/11/2020    PHOS 3.9 10/08/2019    PHOS 3.8 09/30/2019    PHOS 2.7 06/03/2019    BUN 52 01/16/2020    BUN 78 01/12/2020    BUN 67 01/11/2020    CREATININE 1.8 01/16/2020    CREATININE 3.2 01/12/2020    CREATININE 2.5 01/11/2020     BNP:   Lab Results   Component Value Date    PROBNP 5,946 01/16/2020    PROBNP 2,534 01/10/2020    PROBNP 2,343 12/28/2019     Troponin: No components found for: TROPONIN  Lipids:   Lab Results   Component Value Date    TRIG 140 01/11/2020    TRIG 278 10/21/2019    HDL 27 01/11/2020    HDL 31 10/21/2019    LDLCALC 103 01/11/2020    LDLCALC 91 10/21/2019    LDLDIRECT 187 08/01/2017     Cardiac Imaging:  CT Abd/ Pelvis 12/25/19:   FINDINGS: Lower Chest: Lung bases are grossly clear. Small hiatal hernia. Left lower lobe calcified granuloma.  Organs: Noncontrast appearance of the liver, spleen, adrenal glands, and pancreas unremarkable. Pneumobilia identified within liver may be related to previous cholecystectomy or other biliary procedure. Kidneys symmetric in size. Left renal hilar calcifications are favored to be vascular. GI/Bowel: Moderate to large amount retained stool throughout the colon. Appendix is unremarkable. No evidence of bowel obstruction. Pelvis: Bladder is mildly distended. Uterus unremarkable. No adnexal mass. Peritoneum/Retroperitoneum: Moderate severe aortic vascular calcifications. Aorta is nonaneurysmal.  No free air or free fluid. Bones/Soft Tissues: Severe multilevel facet arthropathy       BLE ZACH's 4/18/19:   1. There is severe arterial insufficiency at rest involving the right lower    extremity. There occlusive disease of the right proximal superficial femoral    (noted stent) and mid posterior tibial arteries. There is a 50-74% stenosis    at 4the right deep femoral artery with evidence of inflow disease.    2. There is severe arterial insufficiency at rest involving the left lower    extremity. There is occlusive disease of the left proximal superficial    femoral artery (noted stent). There is a 30-49% stenosis at the left deep    femoral artery with evidence of inflow disease.         ECHO 4/3/19:   [place] systolic function is mildly reduced with EF estimated at 40-45%.   There is severe HK of the apex and apical-septal segment.   Normal left ventricular diastolic filling pressure.   Mild mitral regurgitation.   Systolic pulmonary artery pressure (SPAP) estimated at 32mmHg (RA pressure 3mmHg). Arterial doppler studies 8/2/18:   [place] is no arterial insufficiency at rest involving the lower extremities    bilaterally, however, there is evidence to suggest calf vessel disease    bilaterally. [facility] 3/26/18 [employer] promus premier [id number] CCx and [id number] CCx.     Echo: 5/23/18:    Ejection fraction is

## 2020-01-17 ENCOUNTER — APPOINTMENT (OUTPATIENT)
Dept: GENERAL RADIOLOGY | Age: 55
DRG: 166 | End: 2020-01-17
Payer: COMMERCIAL

## 2020-01-17 ENCOUNTER — HOSPITAL ENCOUNTER (INPATIENT)
Age: 55
LOS: 13 days | Discharge: HOME HEALTH | DRG: 166 | End: 2020-01-31
Attending: EMERGENCY MEDICINE | Admitting: FAMILY MEDICINE
Payer: COMMERCIAL

## 2020-01-17 ENCOUNTER — OFFICE VISIT (OUTPATIENT)
Dept: VASCULAR SURGERY | Age: 55
End: 2020-01-17
Payer: COMMERCIAL

## 2020-01-17 ENCOUNTER — TELEPHONE (OUTPATIENT)
Dept: OTHER | Facility: CLINIC | Age: 55
End: 2020-01-17

## 2020-01-17 VITALS
BODY MASS INDEX: 39.37 KG/M2 | HEIGHT: 66 IN | DIASTOLIC BLOOD PRESSURE: 62 MMHG | WEIGHT: 245 LBS | SYSTOLIC BLOOD PRESSURE: 100 MMHG

## 2020-01-17 PROBLEM — R60.9 PERIPHERAL EDEMA: Status: ACTIVE | Noted: 2020-01-17

## 2020-01-17 PROBLEM — R07.9 CHEST PAIN: Status: ACTIVE | Noted: 2020-01-17

## 2020-01-17 LAB
A/G RATIO: 1 (ref 1.1–2.2)
ALBUMIN SERPL-MCNC: 3.9 G/DL (ref 3.4–5)
ALP BLD-CCNC: 109 U/L (ref 40–129)
ALT SERPL-CCNC: 8 U/L (ref 10–40)
ANION GAP SERPL CALCULATED.3IONS-SCNC: 14 MMOL/L (ref 3–16)
APTT: 31.7 SEC (ref 24.2–36.2)
AST SERPL-CCNC: 7 U/L (ref 15–37)
BASOPHILS ABSOLUTE: 0 K/UL (ref 0–0.2)
BASOPHILS RELATIVE PERCENT: 0.2 %
BILIRUB SERPL-MCNC: 0.3 MG/DL (ref 0–1)
BILIRUBIN URINE: NEGATIVE
BLOOD, URINE: NEGATIVE
BUN BLDV-MCNC: 50 MG/DL (ref 7–20)
CALCIUM SERPL-MCNC: 9.7 MG/DL (ref 8.3–10.6)
CHLORIDE BLD-SCNC: 91 MMOL/L (ref 99–110)
CLARITY: CLEAR
CO2: 31 MMOL/L (ref 21–32)
COLOR: YELLOW
CREAT SERPL-MCNC: 1.8 MG/DL (ref 0.6–1.1)
EKG ATRIAL RATE: 95 BPM
EKG DIAGNOSIS: NORMAL
EKG P AXIS: 72 DEGREES
EKG P-R INTERVAL: 182 MS
EKG Q-T INTERVAL: 390 MS
EKG QRS DURATION: 136 MS
EKG QTC CALCULATION (BAZETT): 490 MS
EKG R AXIS: -58 DEGREES
EKG T AXIS: 79 DEGREES
EKG VENTRICULAR RATE: 95 BPM
EOSINOPHILS ABSOLUTE: 0.1 K/UL (ref 0–0.6)
EOSINOPHILS RELATIVE PERCENT: 1.1 %
GFR AFRICAN AMERICAN: 35
GFR NON-AFRICAN AMERICAN: 29
GLOBULIN: 4 G/DL
GLUCOSE BLD-MCNC: 121 MG/DL (ref 70–99)
GLUCOSE BLD-MCNC: 155 MG/DL (ref 70–99)
GLUCOSE BLD-MCNC: 58 MG/DL (ref 70–99)
GLUCOSE BLD-MCNC: 58 MG/DL (ref 70–99)
GLUCOSE URINE: NEGATIVE MG/DL
HCT VFR BLD CALC: 30.6 % (ref 36–48)
HEMOGLOBIN: 10.1 G/DL (ref 12–16)
KETONES, URINE: NEGATIVE MG/DL
LEUKOCYTE ESTERASE, URINE: NEGATIVE
LYMPHOCYTES ABSOLUTE: 0.7 K/UL (ref 1–5.1)
LYMPHOCYTES RELATIVE PERCENT: 5.8 %
MCH RBC QN AUTO: 29.3 PG (ref 26–34)
MCHC RBC AUTO-ENTMCNC: 32.9 G/DL (ref 31–36)
MCV RBC AUTO: 89.1 FL (ref 80–100)
MICROSCOPIC EXAMINATION: NORMAL
MONOCYTES ABSOLUTE: 0.6 K/UL (ref 0–1.3)
MONOCYTES RELATIVE PERCENT: 4.6 %
NEUTROPHILS ABSOLUTE: 10.6 K/UL (ref 1.7–7.7)
NEUTROPHILS RELATIVE PERCENT: 88.3 %
NITRITE, URINE: NEGATIVE
PDW BLD-RTO: 16 % (ref 12.4–15.4)
PERFORMED ON: ABNORMAL
PH UA: 5.5 (ref 5–8)
PLATELET # BLD: 440 K/UL (ref 135–450)
PMV BLD AUTO: 8.7 FL (ref 5–10.5)
POTASSIUM SERPL-SCNC: 3.9 MMOL/L (ref 3.5–5.1)
PRO-BNP: 4052 PG/ML (ref 0–124)
PROTEIN UA: NEGATIVE MG/DL
RBC # BLD: 3.44 M/UL (ref 4–5.2)
SODIUM BLD-SCNC: 136 MMOL/L (ref 136–145)
SPECIFIC GRAVITY UA: 1.01 (ref 1–1.03)
TOTAL PROTEIN: 7.9 G/DL (ref 6.4–8.2)
TROPONIN: 0.01 NG/ML
TROPONIN: 0.01 NG/ML
TROPONIN: <0.01 NG/ML
URINE REFLEX TO CULTURE: NORMAL
URINE TYPE: NORMAL
UROBILINOGEN, URINE: 0.2 E.U./DL
WBC # BLD: 12.1 K/UL (ref 4–11)

## 2020-01-17 PROCEDURE — 71046 X-RAY EXAM CHEST 2 VIEWS: CPT

## 2020-01-17 PROCEDURE — 6360000002 HC RX W HCPCS: Performed by: STUDENT IN AN ORGANIZED HEALTH CARE EDUCATION/TRAINING PROGRAM

## 2020-01-17 PROCEDURE — 6370000000 HC RX 637 (ALT 250 FOR IP): Performed by: STUDENT IN AN ORGANIZED HEALTH CARE EDUCATION/TRAINING PROGRAM

## 2020-01-17 PROCEDURE — G8427 DOCREV CUR MEDS BY ELIG CLIN: HCPCS | Performed by: SURGERY

## 2020-01-17 PROCEDURE — 6360000002 HC RX W HCPCS

## 2020-01-17 PROCEDURE — 99285 EMERGENCY DEPT VISIT HI MDM: CPT

## 2020-01-17 PROCEDURE — G8482 FLU IMMUNIZE ORDER/ADMIN: HCPCS | Performed by: SURGERY

## 2020-01-17 PROCEDURE — 83880 ASSAY OF NATRIURETIC PEPTIDE: CPT

## 2020-01-17 PROCEDURE — 99214 OFFICE O/P EST MOD 30 MIN: CPT | Performed by: SURGERY

## 2020-01-17 PROCEDURE — 1111F DSCHRG MED/CURRENT MED MERGE: CPT | Performed by: SURGERY

## 2020-01-17 PROCEDURE — G8417 CALC BMI ABV UP PARAM F/U: HCPCS | Performed by: SURGERY

## 2020-01-17 PROCEDURE — G0378 HOSPITAL OBSERVATION PER HR: HCPCS

## 2020-01-17 PROCEDURE — 93005 ELECTROCARDIOGRAM TRACING: CPT | Performed by: EMERGENCY MEDICINE

## 2020-01-17 PROCEDURE — 3017F COLORECTAL CA SCREEN DOC REV: CPT | Performed by: SURGERY

## 2020-01-17 PROCEDURE — 80053 COMPREHEN METABOLIC PANEL: CPT

## 2020-01-17 PROCEDURE — 36415 COLL VENOUS BLD VENIPUNCTURE: CPT

## 2020-01-17 PROCEDURE — 94150 VITAL CAPACITY TEST: CPT

## 2020-01-17 PROCEDURE — 1036F TOBACCO NON-USER: CPT | Performed by: SURGERY

## 2020-01-17 PROCEDURE — 2580000003 HC RX 258

## 2020-01-17 PROCEDURE — P9047 ALBUMIN (HUMAN), 25%, 50ML: HCPCS

## 2020-01-17 PROCEDURE — 2500000003 HC RX 250 WO HCPCS

## 2020-01-17 PROCEDURE — 84484 ASSAY OF TROPONIN QUANT: CPT

## 2020-01-17 PROCEDURE — 96376 TX/PRO/DX INJ SAME DRUG ADON: CPT

## 2020-01-17 PROCEDURE — 85025 COMPLETE CBC W/AUTO DIFF WBC: CPT

## 2020-01-17 PROCEDURE — 81003 URINALYSIS AUTO W/O SCOPE: CPT

## 2020-01-17 PROCEDURE — 6370000000 HC RX 637 (ALT 250 FOR IP): Performed by: FAMILY MEDICINE

## 2020-01-17 PROCEDURE — 85730 THROMBOPLASTIN TIME PARTIAL: CPT

## 2020-01-17 RX ORDER — HEPARIN SODIUM 1000 [USP'U]/ML
4000 INJECTION, SOLUTION INTRAVENOUS; SUBCUTANEOUS ONCE
Status: COMPLETED | OUTPATIENT
Start: 2020-01-17 | End: 2020-01-17

## 2020-01-17 RX ORDER — ERGOCALCIFEROL 1.25 MG/1
50000 CAPSULE ORAL WEEKLY
Status: DISCONTINUED | OUTPATIENT
Start: 2020-01-18 | End: 2020-01-31 | Stop reason: HOSPADM

## 2020-01-17 RX ORDER — SODIUM CHLORIDE 0.9 % (FLUSH) 0.9 %
10 SYRINGE (ML) INJECTION EVERY 12 HOURS SCHEDULED
Status: DISCONTINUED | OUTPATIENT
Start: 2020-01-17 | End: 2020-01-27

## 2020-01-17 RX ORDER — NICOTINE POLACRILEX 4 MG
15 LOZENGE BUCCAL PRN
Status: DISCONTINUED | OUTPATIENT
Start: 2020-01-17 | End: 2020-01-27

## 2020-01-17 RX ORDER — METOLAZONE 2.5 MG/1
2.5 TABLET ORAL DAILY
Status: DISCONTINUED | OUTPATIENT
Start: 2020-01-17 | End: 2020-01-20

## 2020-01-17 RX ORDER — ALBUTEROL SULFATE 90 UG/1
1 AEROSOL, METERED RESPIRATORY (INHALATION) EVERY 4 HOURS PRN
Status: DISCONTINUED | OUTPATIENT
Start: 2020-01-17 | End: 2020-01-17

## 2020-01-17 RX ORDER — ONDANSETRON 2 MG/ML
4 INJECTION INTRAMUSCULAR; INTRAVENOUS EVERY 6 HOURS PRN
Status: DISCONTINUED | OUTPATIENT
Start: 2020-01-17 | End: 2020-01-27

## 2020-01-17 RX ORDER — INSULIN GLARGINE 100 [IU]/ML
100 INJECTION, SOLUTION SUBCUTANEOUS NIGHTLY
Status: DISCONTINUED | OUTPATIENT
Start: 2020-01-17 | End: 2020-01-18

## 2020-01-17 RX ORDER — DEXTROSE MONOHYDRATE 50 MG/ML
100 INJECTION, SOLUTION INTRAVENOUS PRN
Status: DISCONTINUED | OUTPATIENT
Start: 2020-01-17 | End: 2020-01-27

## 2020-01-17 RX ORDER — ATORVASTATIN CALCIUM 80 MG/1
80 TABLET, FILM COATED ORAL NIGHTLY
Status: DISCONTINUED | OUTPATIENT
Start: 2020-01-17 | End: 2020-01-27

## 2020-01-17 RX ORDER — HEPARIN SODIUM 1000 [USP'U]/ML
4000 INJECTION, SOLUTION INTRAVENOUS; SUBCUTANEOUS PRN
Status: DISCONTINUED | OUTPATIENT
Start: 2020-01-17 | End: 2020-01-20

## 2020-01-17 RX ORDER — NICOTINE 21 MG/24HR
1 PATCH, TRANSDERMAL 24 HOURS TRANSDERMAL DAILY
Status: DISCONTINUED | OUTPATIENT
Start: 2020-01-18 | End: 2020-01-27

## 2020-01-17 RX ORDER — DEXTROSE MONOHYDRATE 25 G/50ML
12.5 INJECTION, SOLUTION INTRAVENOUS PRN
Status: DISCONTINUED | OUTPATIENT
Start: 2020-01-17 | End: 2020-01-27

## 2020-01-17 RX ORDER — HEPARIN SODIUM 1000 [USP'U]/ML
2000 INJECTION, SOLUTION INTRAVENOUS; SUBCUTANEOUS PRN
Status: DISCONTINUED | OUTPATIENT
Start: 2020-01-17 | End: 2020-01-20

## 2020-01-17 RX ORDER — SACCHAROMYCES BOULARDII 250 MG
250 CAPSULE ORAL 2 TIMES DAILY
Status: DISCONTINUED | OUTPATIENT
Start: 2020-01-17 | End: 2020-01-27

## 2020-01-17 RX ORDER — ALBUTEROL SULFATE 90 UG/1
2 AEROSOL, METERED RESPIRATORY (INHALATION) EVERY 4 HOURS PRN
Status: DISCONTINUED | OUTPATIENT
Start: 2020-01-17 | End: 2020-01-28

## 2020-01-17 RX ORDER — LAMOTRIGINE 100 MG/1
150 TABLET ORAL 2 TIMES DAILY
Status: DISCONTINUED | OUTPATIENT
Start: 2020-01-17 | End: 2020-01-27

## 2020-01-17 RX ORDER — SODIUM CHLORIDE 0.9 % (FLUSH) 0.9 %
10 SYRINGE (ML) INJECTION PRN
Status: DISCONTINUED | OUTPATIENT
Start: 2020-01-17 | End: 2020-01-27

## 2020-01-17 RX ORDER — HEPARIN SODIUM 10000 [USP'U]/100ML
1000 INJECTION, SOLUTION INTRAVENOUS CONTINUOUS
Status: DISCONTINUED | OUTPATIENT
Start: 2020-01-17 | End: 2020-01-18

## 2020-01-17 RX ORDER — BUSPIRONE HYDROCHLORIDE 5 MG/1
30 TABLET ORAL 2 TIMES DAILY
Status: DISCONTINUED | OUTPATIENT
Start: 2020-01-17 | End: 2020-01-27

## 2020-01-17 RX ORDER — BENZTROPINE MESYLATE 1 MG/1
1 TABLET ORAL DAILY
Status: DISCONTINUED | OUTPATIENT
Start: 2020-01-17 | End: 2020-01-31 | Stop reason: HOSPADM

## 2020-01-17 RX ORDER — ASPIRIN 81 MG/1
81 TABLET ORAL DAILY
Status: DISCONTINUED | OUTPATIENT
Start: 2020-01-17 | End: 2020-01-27

## 2020-01-17 RX ORDER — SPIRONOLACTONE 100 MG/1
100 TABLET, FILM COATED ORAL 2 TIMES DAILY
Status: DISCONTINUED | OUTPATIENT
Start: 2020-01-17 | End: 2020-01-20

## 2020-01-17 RX ORDER — NITROGLYCERIN 0.4 MG/1
0.4 TABLET SUBLINGUAL EVERY 5 MIN PRN
Status: DISCONTINUED | OUTPATIENT
Start: 2020-01-17 | End: 2020-01-27

## 2020-01-17 RX ORDER — ARIPIPRAZOLE 10 MG/1
10 TABLET ORAL DAILY
Status: DISCONTINUED | OUTPATIENT
Start: 2020-01-17 | End: 2020-01-27

## 2020-01-17 RX ORDER — CLOPIDOGREL BISULFATE 75 MG/1
75 TABLET ORAL DAILY
Status: DISCONTINUED | OUTPATIENT
Start: 2020-01-17 | End: 2020-01-20

## 2020-01-17 RX ORDER — CARVEDILOL 3.12 MG/1
3.12 TABLET ORAL 2 TIMES DAILY
Status: DISCONTINUED | OUTPATIENT
Start: 2020-01-17 | End: 2020-01-22

## 2020-01-17 RX ORDER — ACETAMINOPHEN 325 MG/1
650 TABLET ORAL EVERY 4 HOURS PRN
Status: DISCONTINUED | OUTPATIENT
Start: 2020-01-17 | End: 2020-01-27

## 2020-01-17 RX ORDER — PANTOPRAZOLE SODIUM 40 MG/1
40 TABLET, DELAYED RELEASE ORAL 2 TIMES DAILY
Status: DISCONTINUED | OUTPATIENT
Start: 2020-01-17 | End: 2020-01-27

## 2020-01-17 RX ORDER — TORSEMIDE 100 MG/1
100 TABLET ORAL 2 TIMES DAILY
Status: DISCONTINUED | OUTPATIENT
Start: 2020-01-17 | End: 2020-01-20

## 2020-01-17 RX ADMIN — HEPARIN SODIUM 4000 UNITS: 1000 INJECTION INTRAVENOUS; SUBCUTANEOUS at 18:56

## 2020-01-17 RX ADMIN — PANTOPRAZOLE SODIUM 40 MG: 40 TABLET, DELAYED RELEASE ORAL at 19:10

## 2020-01-17 RX ADMIN — ATORVASTATIN CALCIUM 80 MG: 80 TABLET, FILM COATED ORAL at 20:57

## 2020-01-17 RX ADMIN — MAGNESIUM OXIDE TAB 400 MG (241.3 MG ELEMENTAL MG) 400 MG: 400 (241.3 MG) TAB at 19:09

## 2020-01-17 RX ADMIN — ARIPIPRAZOLE 10 MG: 10 TABLET ORAL at 19:09

## 2020-01-17 RX ADMIN — ACETAMINOPHEN 650 MG: 325 TABLET ORAL at 23:26

## 2020-01-17 RX ADMIN — BENZTROPINE MESYLATE 1 MG: 1 TABLET ORAL at 19:09

## 2020-01-17 RX ADMIN — Medication 250 MG: at 20:57

## 2020-01-17 RX ADMIN — BUSPIRONE HYDROCHLORIDE 30 MG: 5 TABLET ORAL at 19:10

## 2020-01-17 RX ADMIN — ASPIRIN 81 MG: 81 TABLET, COATED ORAL at 19:09

## 2020-01-17 RX ADMIN — CARVEDILOL 3.12 MG: 3.12 TABLET, FILM COATED ORAL at 20:57

## 2020-01-17 RX ADMIN — HEPARIN SODIUM AND DEXTROSE 10 ML/HR: 10000; 5 INJECTION INTRAVENOUS at 18:57

## 2020-01-17 RX ADMIN — TORSEMIDE 100 MG: 100 TABLET ORAL at 20:58

## 2020-01-17 RX ADMIN — SPIRONOLACTONE 100 MG: 100 TABLET ORAL at 20:58

## 2020-01-17 RX ADMIN — CLOPIDOGREL BISULFATE 75 MG: 75 TABLET ORAL at 19:10

## 2020-01-17 RX ADMIN — LAMOTRIGINE 150 MG: 100 TABLET ORAL at 20:57

## 2020-01-17 ASSESSMENT — PAIN SCALES - GENERAL
PAINLEVEL_OUTOF10: 5
PAINLEVEL_OUTOF10: 7
PAINLEVEL_OUTOF10: 7
PAINLEVEL_OUTOF10: 9
PAINLEVEL_OUTOF10: 7

## 2020-01-17 ASSESSMENT — PAIN DESCRIPTION - LOCATION
LOCATION: BACK
LOCATION: BACK

## 2020-01-17 NOTE — ED NOTES
Bedside report given Mary Denise RN from B-3. [facility] confirmed tele box 60.         Donaldo Farrell RN  01/17/20 5230

## 2020-01-17 NOTE — ED PROVIDER NOTES
[address] [facility]  ED  EMERGENCY DEPARTMENT ENCOUNTER      Pt Name: Ronald Guerrero  MRN: [id number]  [place] 1965  Date of evaluation: 1/17/2020  Provider: Marshal Bamberger, MD    CHIEF COMPLAINT       Chief Complaint   Patient presents with    Chest Pain     pt sts\" i think i had a heart attack this morning, i took a nitro and it helped and it was a pain i have never felt\" pain was all through chest and back         HISTORY OF PRESENT ILLNESS   (Location/Symptom, Timing/Onset, Context/Setting, Quality, Duration, Modifying Factors, Severity)  Note limiting factors. Ronald Guerrero is a 62 y.o. female with past medical history of hypertension, diabetes, peripheral vascular disease, coronary artery disease with prior MI status post stenting, CHF, COPD and chronic kidney disease here with chest pain. Patient states that earlier today at approximately 930 she began to have a substernal chest pressure. She states it felt like a heaviness as though someone was sitting on her chest.  It lasted approximately 30 to 45 minutes and resolved after taking 2 nitroglycerin tablets. She denies shortness of breath. No cough or hemoptysis. She has chronic swelling in her legs which is actually improved from prior. States her pain was moderate to severe but now is not present. HPI    Nursing Notes were reviewed. REVIEW OF SYSTEMS    (2-9 systems for level 4, 10 or more for level 5)     Review of Systems    Please see HPI for pertinent positive and negative review of system findings. A full 10 system ROS was performed and otherwise negative.         PAST MEDICAL HISTORY     Past Medical History:   Diagnosis Date    Anxiety and depression     CAD (coronary artery disease) 01/2018    Cardiomy[facility]     CHF (congestive heart failure) (HCC)     COPD (chronic obstructive pulmonary disease) (HCC)     Diabetes mellitus (HCC)     GERD (gastroesophageal reflux disease)     Hyperlipidemia     Hypertension     Hypotension     [facility]) [facility]     Peripheral neuropathy     Peripheral vascular disease (HCC)     Pulmonary HTN (HCC)     Reflux     Sleep apnea     has never done sleep study;does not use CPAP    Wears glasses     for reading         SURGICAL HISTORY       Past Surgical History:   Procedure Laterality Date    ARTERIAL BYPASS SURGRY Left 01/06/2016    Axillary/Subclavian to Brachial Bypass w/reversed SVG    CARDIAC CATHETERIZATION  03/27/2018    [facility] Sergio Garcia [facility] [address] [facility]      pancreatitis post surgery    CORONARY ANGIOPLASTY WITH STENT PLACEMENT  03/16/2018    MELODY- 3.5 x 38 and 3.5 x 20 to Cx    CORONARY ANGIOPLASTY WITH STENT PLACEMENT  01/03/2018    MELODY- 3.0 x 38 and 2.75 x 26 and 2.75 x 8 and 2.75 x 22 to the LAD    FEMORAL BYPASS Right 5/16/2019    RIGHT FEMORAL POPLITEAL ARTERY BYPASS GRAFT performed by Raul Brasher MD at [address]  02/14/2019    CardioMEMs insertion for CHF    OTHER SURGICAL HISTORY  10/08/2019    Bilateral LE angio with PTA occluded L SFA and restenting L Prox SFA    ROTATOR CUFF REPAIR Left 04/22/2016    TONSILLECTOMY      TUMOR EXCISION      benign tumors X 2 chest & axilla    UPPER GASTROINTESTINAL ENDOSCOPY  4/25/2013    BX barretts, HH, gastritis    UPPER GASTROINTESTINAL ENDOSCOPY  12/10/2015    UPPER GASTROINTESTINAL ENDOSCOPY  01/06/2017    Gastritis         CURRENT MEDICATIONS       Previous Medications    ALBUTEROL SULFATE  (90 BASE) MCG/ACT INHALER    INHALE 2 PUFFS BY MOUTH EVERY 6 HOURS AS NEEDED FOR WHEEZING/SHORTNESS OF BREATH    ARIPIPRAZOLE (ABILIFY) 10 MG TABLET    Take 10 mg by mouth daily     ASPIRIN EC 81 MG EC TABLET    Take 1 tablet by mouth daily    ATORVASTATIN (LIPITOR) 80 MG TABLET    Take 80 mg by mouth nightly    BENZTROPINE (COGENTIN) 1 MG TABLET        BLOOD GLUCOSE TEST STRIPS (EXACTECH TEST) STRIP    6 times daily. BUPRENORPHINE-NALOXONE (SUBOXONE) 8-2 MG SUBL SL TABLET    PLACE 1 TABLET UNDER THE BERNARDO TWICE A DAY    BUSPIRONE (BUSPAR) 30 MG TABLET    Take 30 mg by mouth 2 times daily     CARVEDILOL (COREG) 3.125 MG TABLET    TAKE 1 TABLET BY MOUTH TWICE A DAY WITH MEALS    CLINDAMYCIN (CLEOCIN) 300 MG CAPSULE    Take 1 capsule by mouth 4 times daily for 5 days    CLOPIDOGREL (PLAVIX) 75 MG TABLET    TAKE 1 TABLET BY MOUTH EVERY DAY    DIGOXIN (LANOXIN) 125 MCG TABLET    Take 1 tablet by mouth daily    INSULIN DEGLUDEC (TRESIBA FLEXTOUCH) 100 UNIT/ML SOPN    Inject 100 Units into the skin nightly    INSULIN LISPRO (HUMALOG KWIKPEN) 100 UNIT/ML PEN    Inject 15 Units into the skin 3 times daily (before meals)    INSULIN PEN NEEDLE (B-D ULTRAFINE III SHORT PEN) 31G X 8 MM MISC    Five shots a day    INSULIN SYRINGE .5CC/29G 29G X 1/2\" 0.5 ML MISC    1 each by Does not apply route daily    LAMOTRIGINE (LAMICTAL) 150 MG TABLET    Take 150 mg by mouth 2 times daily     LIRAGLUTIDE (VICTOZA) 18 MG/3ML SOPN SC INJECTION    Inject 1.2 mg into the skin daily    MAGNESIUM OXIDE (MAG-OX) 400 (240 MG) MG TABLET    TAKE 1 TABLET ONCE DAILY    METOLAZONE (ZAROXOLYN) 2.5 MG TABLET    Take 1 tablet by mouth daily    NITROGLYCERIN (NITROSTAT) 0.4 MG SL TABLET    PLEASE SEE ATTACHED FOR DETAILED DIRECTIONS    NYSTATIN (MYCOSTATIN) 654367 UNIT/GM CREAM        PANTOPRAZOLE (PROTONIX) 40 MG TABLET    Take 1 tablet by mouth 2 times daily    SACCHAROMYCES BOULARDII (FLORASTOR) 250 MG CAPSULE    Take 1 capsule by mouth 2 times daily for 7 days    SPIRIVA RESPIMAT 2.5 MCG/ACT AERS INHALER    INHALE 2 PUFFS INTO THE LUNGS DAILY    SPIRONOLACTONE (ALDACTONE) 100 MG TABLET    Take 1 tablet by mouth 2 times daily    TORSEMIDE (DEMADEX) 100 MG TABLET    Take 1 tablet by mouth 2 times daily    VARENICLINE TARTRATE (CHANTIX PO)    Take by mouth    VITAMIN D (ERGOCALCIFEROL) 1.25 MG (48856 UT) CAPS CAPSULE    Take 1 capsule by mouth once a week ALLERGIES     Lisinopril    FAMILY HISTORY       Family History   Problem Relation Age of Onset    Heart Disease Maternal Grandmother     Cancer Mother         lung and brain cancer    Cancer Maternal Aunt         lung and brain cancer          SOCIAL HISTORY       Social History     Socioeconomic History    Marital status: Single     Spouse name: None    Number of children: None    Years of education: None    Highest education level: None   Occupational History    None   Social Needs    Financial resource strain: None    Food insecurity:     Worry: None     Inability: None    Transportation needs:     Medical: None     Non-medical: None   Tobacco Use    Smoking status: Former Smoker     Packs/day: 0.25     Years: 30.00     Pack years: 7.50     Types: Cigarettes     Last attempt to quit: 2019     Years since quittin.4    Smokeless tobacco: Never Used   Substance and Sexual Activity    Alcohol use: No    Drug use: Never    Sexual activity: Yes     Partners: Male   Lifestyle    Physical activity:     Days per week: None     Minutes per session: None    Stress: None   Relationships    Social connections:     Talks on phone: None     Gets together: None     Attends Bahai service: None     Active member of club or organization: None     Attends meetings of clubs or organizations: None     Relationship status: None    Intimate partner violence:     Fear of current or ex partner: None     Emotionally abused: None     Physically abused: None     Forced sexual activity: None   Other Topics Concern    None   Social History Narrative    None       SCREENINGS               PHYSICAL EXAM    (up to 7 for level 4, 8 or more for level 5)     ED Triage Vitals [20 1311]   BP Temp Temp Source Pulse Resp SpO2 Height Weight   107/72 98.1 °F (36.7 °C) Oral 100 18 98 % -- 245 lb (111.1 kg)       Physical Exam    General appearance:  Cooperative. No acute distress. Skin:  Warm. Dry.    Eye: Performed at:  [facility]) [facility]  [address],  [place], [facility] [address]   Phone [phone]       Interpretation per the Radiologist below, if obtained/available at the time of this note:    XR CHEST STANDARD (2 VW)   Preliminary Result   Cardiomegaly without evidence of acute cardiopulmonary process. All other labs/imaging were within normal range or not returned as of this dictation. EMERGENCY DEPARTMENT COURSE and DIFFERENTIAL DIAGNOSIS/MDM:   Vitals:    Vitals:    01/17/20 1311 01/17/20 1411 01/17/20 1513   BP: 107/72 (!) 86/54 106/60   Pulse: 100 81 87   Resp: 18  20   Temp: 98.1 °F (36.7 °C)     TempSrc: Oral     SpO2: 98% 99% 98%   Weight: 245 lb (111.1 kg)         Sinus rhythm at a rate of 95 bpm with left axis deviation and right bundle branch block. Septal Q waves inferiorly and in the septal leads. No ST elevation. ST segment flattening laterally in 1 and aVL. Compared to prior no change    Patient presents the emergency department today with fairly typical chest pain. Longstanding history of vascular disease with known coronary artery disease and stenting. EKG without change. Troponin negative. Given the patient's history she will be admitted for further evaluation. MDM    CONSULTS     IP CONSULT TO FAMILY MEDICINE    Critical Care:   None    REASSESSMENT          PROCEDURE     Unless otherwise noted below, none     Procedures      FINAL IMPRESSION      1. Other chest pain            DISPOSITION/PLAN   DISPOSITION Decision To Admit 01/17/2020 04:09:34 PM        PATIENT REFERRED TO:  No follow-up provider specified. DISCHARGE MEDICATIONS:  New Prescriptions    No medications on file     Controlled Substances Monitoring:     No flowsheet data found.     (Please note that portions of this note were completed with a voice recognition program.  Efforts were made to edit the dictations but occasionally words are mis-transcribed.)    Tremayne Bates MD (electronically signed)  Attending Emergency Physician            Corey Amador MD  01/17/20 [id number]

## 2020-01-17 NOTE — H&P
tablet Take 1 tablet by mouth 2 times daily 1/16/20  Yes TRAY Thomas CNP   spironolactone (ALDACTONE) 100 MG tablet Take 1 tablet by mouth 2 times daily 1/16/20  Yes TRAY Thomas CNP   metOLazone (ZAROXOLYN) 2.5 MG tablet Take 1 tablet by mouth daily 1/16/20  Yes TRAY Thomas CNP   clindamycin (CLEOCIN) 300 MG capsule Take 1 capsule by mouth 4 times daily for 5 days 1/12/20 1/17/20 Yes Beth Mcghee DO   saccharomyces boulardii (FLORASTOR) 250 MG capsule Take 1 capsule by mouth 2 times daily for 7 days 1/12/20 1/19/20 Yes Beth Mcghee DO   nystatin (MYCOSTATIN) 412174 UNIT/GM cream  12/19/19  Yes Historical Provider, MD   benztropine (COGENTIN) 1 MG tablet  12/13/19  Yes Historical Provider, MD   blood glucose test strips (EXACTECH TEST) strip 6 times daily.  12/19/19  Yes Rinda Dubin, APRN - CNP   Insulin Pen Needle (B-D ULTRAFINE III SHORT PEN) 31G X 8 MM MISC Five shots a day 12/3/19  Yes Rinda Dubin, APRN - CNP   INSULIN SYRINGE .5CC/29G 29G X 1/2\" 0.5 ML MISC 1 each by Does not apply route daily 12/3/19  Yes Rinda Dubin, APRN - CNP   Insulin Degludec (TRESIBA FLEXTOUCH) 100 UNIT/ML SOPN Inject 100 Units into the skin nightly 12/3/19  Yes Rinda Dubin, APRN - CNP   carvedilol (COREG) 3.125 MG tablet TAKE 1 TABLET BY MOUTH TWICE A DAY WITH MEALS 11/26/19  Yes TRAY Thomas CNP   Liraglutide (VICTOZA) 18 MG/3ML SOPN SC injection Inject 1.2 mg into the skin daily 10/29/19  Yes Rinda Dubin, APRN - CNP   vitamin D (ERGOCALCIFEROL) 1.25 MG (59023 UT) CAPS capsule Take 1 capsule by mouth once a week 10/29/19  Yes Rinda Dubin, APRN - CNP   albuterol sulfate  (90 Base) MCG/ACT inhaler INHALE 2 PUFFS BY MOUTH EVERY 6 HOURS AS NEEDED FOR WHEEZING/SHORTNESS OF BREATH 10/7/19  Yes Susy Dejesus MD   pantoprazole (PROTONIX) 40 MG tablet Take 1 tablet by mouth 2 times daily 10/1/19  Yes Lawrence Simeon MD   buprenorphine-naloxone (SUBOXONE) 8-2 MG SUBL SL tablet PLACE 1 TABLET UNDER THE BERNARDO TWICE A DAY 9/17/19  Yes Historical Provider, MD   nitroGLYCERIN (NITROSTAT) 0.4 MG SL tablet PLEASE SEE ATTACHED FOR DETAILED DIRECTIONS 7/24/19  Yes Historical Provider, MD   clopidogrel (PLAVIX) 75 MG tablet TAKE 1 TABLET BY MOUTH EVERY DAY 9/3/19  Yes TRAY Boothe CNP   SPIRIVA RESPIMAT 2.5 MCG/ACT AERS inhaler INHALE 2 PUFFS INTO THE LUNGS DAILY 7/15/19  Yes Sarah Jerome MD   magnesium oxide (MAG-OX) 400 (240 Mg) MG tablet TAKE 1 TABLET ONCE DAILY 5/1/19  Yes Elliot Keith MD   insulin lispro (HUMALOG KWIKPEN) 100 UNIT/ML pen Inject 15 Units into the skin 3 times daily (before meals) 4/9/19  Yes Caprice Krause MD   digoxin (LANOXIN) 125 MCG tablet Take 1 tablet by mouth daily 4/3/19  Yes TRAY Boothe CNP   atorvastatin (LIPITOR) 80 MG tablet Take 80 mg by mouth nightly 5/2/18  Yes Historical Provider, MD   busPIRone (BUSPAR) 30 MG tablet Take 30 mg by mouth 2 times daily  4/2/18  Yes Historical Provider, MD   aspirin EC 81 MG EC tablet Take 1 tablet by mouth daily 1/8/16  Yes Jodi Gilliam MD   ARIPiprazole (ABILIFY) 10 MG tablet Take 10 mg by mouth daily    Yes Historical Provider, MD   lamoTRIgine (LAMICTAL) 150 MG tablet Take 150 mg by mouth 2 times daily    Yes Historical Provider, MD   Varenicline Tartrate (CHANTIX PO) Take by mouth    Historical Provider, MD       Allergies:  Lisinopril    Social History:      The patient currently lives with family.  and daughter were in the ED with her at time of evaluation. TOBACCO:   reports that she quit smoking about 5 months ago. Her smoking use included cigarettes. She has a 7.50 pack-year smoking history. She has never used smokeless tobacco. Occasional smokes   ETOH:   reports no history of alcohol use.       Family History:          Problem Relation Age of Onset    Heart Disease Maternal Grandmother     Cancer Mother         lung and brain cancer    Cancer Maternal Aunt         lung and brain cancer       REVIEW OF SYSTEMS:   Pertinent positives as noted in the HPI. All other systems reviewed and negative. PHYSICAL EXAM PERFORMED:    BP (!) 95/56   Pulse 86   Temp 98.1 °F (36.7 °C) (Oral)   Resp 20   Wt 245 lb (111.1 kg)   LMP 10/24/2012   SpO2 100%   BMI 39.54 kg/m²     Physical Exam  Vitals signs reviewed. Constitutional:       General: She is not in acute distress. Appearance: Normal appearance. She is obese. She is not ill-appearing. HENT:      Head: Normocephalic and atraumatic. Eyes:      Conjunctiva/sclera: Conjunctivae normal.   Cardiovascular:      Rate and Rhythm: Normal rate and regular rhythm. Heart sounds: No murmur. No friction rub. No gallop. Pulmonary:      Effort: Pulmonary effort is normal.      Breath sounds: No wheezing, rhonchi or rales. Chest:      Chest wall: Tenderness present. Abdominal:      General: Abdomen is flat. Bowel sounds are normal.      Palpations: Abdomen is soft. Tenderness: There is no guarding or rebound. Musculoskeletal:         General: Tenderness (lower extremeties) present. Right lower leg: Edema (+3 pitting) present. Left lower leg: Edema (+3 pitting) present. Skin:     General: Skin is warm and dry. Findings: Erythema ( lower extremities consistent with venous stasis) present. Comments: Small but clean ulcers noted on patient's bilateral toes. Neurological:      General: No focal deficit present. Mental Status: She is alert and oriented to person, place, and time.    Psychiatric:         Mood and Affect: Mood normal.         Behavior: Behavior normal.           Labs:     Recent Labs     01/17/20  1315   WBC 12.1*   HGB 10.1*   HCT 30.6*        Recent Labs     01/16/20  0800 01/17/20  1315   * 136   K 3.7 3.9   CL 93* 91*   CO2 27 31   BUN 52* 50*   CREATININE 1.8* 1.8*   CALCIUM 9.2 9.7     Recent Labs     01/17/20  1315   AST 7*   ALT 8*   BILITOT 0.3   ALKPHOS 109 No results for input(s): INR in the last 72 hours. Recent Labs     01/17/20  1315   TROPONINI 0.01       Urinalysis:      Lab Results   Component Value Date    NITRU Negative 01/17/2020    WBCUA 3-5 09/28/2019    BACTERIA Rare 09/28/2019    RBCUA None seen 09/28/2019    BLOODU Negative 01/17/2020    SPECGRAV 1.010 01/17/2020    GLUCOSEU Negative 01/17/2020       Radiology:     CXR: I have reviewed the CXR with the following interpretation: Cardiomegaly without any acute process. EKG:  I have reviewed the EKG with the following interpretation: Unchanged from previous EKG. Right BBB. Left axis deviation. XR CHEST STANDARD (2 VW)   Preliminary Result   Cardiomegaly without evidence of acute cardiopulmonary process. ASSESSMENT:    Active Hospital Problems    Diagnosis Date Noted    Chest pain [R07.9] 01/17/2020         PLAN:    1. Chest pain  - History of MI with cardiac stents. - EKG today unchanged from most recent previous EKG other than previous EKG showing mild tachycardia. - Trend troponins.  - Cardiology consulted. - Morning EKG, CBC, CMP. 2. Peripheral edema  - Patient has history of CHF and CKD, but patient also admits to vascular disease in the left leg that may require bypass surgery in the future. - Continue home diuretics and limit fluid intake. - Patient was recently on Cleocin for suspected cellulitis, but picture is more likely to be chronic venous stasis. - Current fluid restriction 1500cc per day. 3. Chronic kidney disease, stage 3, recent SHAUNNA  - Was recently admitted for SHAUNNA with last creatine on discharge 3.2 (1/12/20). - Creatinine better today at 1.8, but baseline in previous labs last year seems to be around 1.3.  - Morning CMP to assess.  - Avoid nephrotoxins.     DVT Prophylaxis: Heparin  Diet: No diet orders on file  Code Status: Prior    PT/OT Eval Status: n/a    Dispo - Admit to observation    This patient was seen and evaluated with resident team and attending, Dr. Hodan Dover DO    Family Medicine Resident PGY1

## 2020-01-17 NOTE — TELEPHONE ENCOUNTER
Writer contacted Oswald Gifford MD  ,ED provider to inform of 30 day readmission risk. ED provider informed writer of readmission.

## 2020-01-18 PROBLEM — L97.529 DIABETIC ULCER OF LEFT FOOT ASSOCIATED WITH TYPE 2 DIABETES MELLITUS (HCC): Status: ACTIVE | Noted: 2020-01-18

## 2020-01-18 PROBLEM — E11.621 DIABETIC ULCER OF TOE OF RIGHT FOOT ASSOCIATED WITH TYPE 2 DIABETES MELLITUS (HCC): Status: ACTIVE | Noted: 2020-01-18

## 2020-01-18 PROBLEM — L97.519 DIABETIC ULCER OF TOE OF RIGHT FOOT ASSOCIATED WITH TYPE 2 DIABETES MELLITUS (HCC): Status: ACTIVE | Noted: 2020-01-18

## 2020-01-18 PROBLEM — E11.649 HYPOGLYCEMIA ASSOCIATED WITH TYPE 2 DIABETES MELLITUS (HCC): Status: ACTIVE | Noted: 2020-01-18

## 2020-01-18 PROBLEM — E11.621 DIABETIC ULCER OF LEFT FOOT ASSOCIATED WITH TYPE 2 DIABETES MELLITUS (HCC): Status: ACTIVE | Noted: 2020-01-18

## 2020-01-18 LAB
A/G RATIO: 0.9 (ref 1.1–2.2)
ALBUMIN SERPL-MCNC: 3.5 G/DL (ref 3.4–5)
ALP BLD-CCNC: 97 U/L (ref 40–129)
ALT SERPL-CCNC: <5 U/L (ref 10–40)
ANION GAP SERPL CALCULATED.3IONS-SCNC: 14 MMOL/L (ref 3–16)
APTT: 32.2 SEC (ref 24.2–36.2)
APTT: 40.7 SEC (ref 24.2–36.2)
APTT: 43.3 SEC (ref 24.2–36.2)
AST SERPL-CCNC: 11 U/L (ref 15–37)
BILIRUB SERPL-MCNC: 0.4 MG/DL (ref 0–1)
BUN BLDV-MCNC: 51 MG/DL (ref 7–20)
CALCIUM SERPL-MCNC: 9.6 MG/DL (ref 8.3–10.6)
CHLORIDE BLD-SCNC: 91 MMOL/L (ref 99–110)
CO2: 31 MMOL/L (ref 21–32)
CREAT SERPL-MCNC: 1.9 MG/DL (ref 0.6–1.1)
EKG ATRIAL RATE: 81 BPM
EKG DIAGNOSIS: NORMAL
EKG P AXIS: 67 DEGREES
EKG P-R INTERVAL: 188 MS
EKG Q-T INTERVAL: 442 MS
EKG QRS DURATION: 144 MS
EKG QTC CALCULATION (BAZETT): 513 MS
EKG R AXIS: -53 DEGREES
EKG T AXIS: 54 DEGREES
EKG VENTRICULAR RATE: 81 BPM
GFR AFRICAN AMERICAN: 33
GFR NON-AFRICAN AMERICAN: 27
GLOBULIN: 3.8 G/DL
GLUCOSE BLD-MCNC: 145 MG/DL (ref 70–99)
GLUCOSE BLD-MCNC: 187 MG/DL (ref 70–99)
GLUCOSE BLD-MCNC: 221 MG/DL (ref 70–99)
GLUCOSE BLD-MCNC: 72 MG/DL (ref 70–99)
HCT VFR BLD CALC: 30.2 % (ref 36–48)
HEMOGLOBIN: 10 G/DL (ref 12–16)
MAGNESIUM: 2 MG/DL (ref 1.8–2.4)
MCH RBC QN AUTO: 29.3 PG (ref 26–34)
MCHC RBC AUTO-ENTMCNC: 33.1 G/DL (ref 31–36)
MCV RBC AUTO: 88.6 FL (ref 80–100)
PDW BLD-RTO: 15.5 % (ref 12.4–15.4)
PERFORMED ON: ABNORMAL
PLATELET # BLD: 431 K/UL (ref 135–450)
PMV BLD AUTO: 8.6 FL (ref 5–10.5)
POTASSIUM REFLEX MAGNESIUM: 3.4 MMOL/L (ref 3.5–5.1)
RBC # BLD: 3.4 M/UL (ref 4–5.2)
SODIUM BLD-SCNC: 136 MMOL/L (ref 136–145)
TOTAL PROTEIN: 7.3 G/DL (ref 6.4–8.2)
WBC # BLD: 9.4 K/UL (ref 4–11)

## 2020-01-18 PROCEDURE — 96366 THER/PROPH/DIAG IV INF ADDON: CPT

## 2020-01-18 PROCEDURE — 6360000002 HC RX W HCPCS: Performed by: STUDENT IN AN ORGANIZED HEALTH CARE EDUCATION/TRAINING PROGRAM

## 2020-01-18 PROCEDURE — 99255 IP/OBS CONSLTJ NEW/EST HI 80: CPT | Performed by: INTERNAL MEDICINE

## 2020-01-18 PROCEDURE — 96376 TX/PRO/DX INJ SAME DRUG ADON: CPT

## 2020-01-18 PROCEDURE — 83735 ASSAY OF MAGNESIUM: CPT

## 2020-01-18 PROCEDURE — 85730 THROMBOPLASTIN TIME PARTIAL: CPT

## 2020-01-18 PROCEDURE — G0378 HOSPITAL OBSERVATION PER HR: HCPCS

## 2020-01-18 PROCEDURE — 96365 THER/PROPH/DIAG IV INF INIT: CPT

## 2020-01-18 PROCEDURE — 6370000000 HC RX 637 (ALT 250 FOR IP): Performed by: STUDENT IN AN ORGANIZED HEALTH CARE EDUCATION/TRAINING PROGRAM

## 2020-01-18 PROCEDURE — 36415 COLL VENOUS BLD VENIPUNCTURE: CPT

## 2020-01-18 PROCEDURE — 94640 AIRWAY INHALATION TREATMENT: CPT

## 2020-01-18 PROCEDURE — 1200000000 HC SEMI PRIVATE

## 2020-01-18 PROCEDURE — 85027 COMPLETE CBC AUTOMATED: CPT

## 2020-01-18 PROCEDURE — 93005 ELECTROCARDIOGRAM TRACING: CPT | Performed by: STUDENT IN AN ORGANIZED HEALTH CARE EDUCATION/TRAINING PROGRAM

## 2020-01-18 PROCEDURE — 80053 COMPREHEN METABOLIC PANEL: CPT

## 2020-01-18 PROCEDURE — 93010 ELECTROCARDIOGRAM REPORT: CPT | Performed by: INTERNAL MEDICINE

## 2020-01-18 RX ORDER — BUPRENORPHINE AND NALOXONE 8; 2 MG/1; MG/1
1 FILM, SOLUBLE BUCCAL; SUBLINGUAL EVERY 12 HOURS
Status: DISCONTINUED | OUTPATIENT
Start: 2020-01-18 | End: 2020-01-27

## 2020-01-18 RX ORDER — POTASSIUM CHLORIDE 20 MEQ/1
20 TABLET, EXTENDED RELEASE ORAL 2 TIMES DAILY
Status: DISCONTINUED | OUTPATIENT
Start: 2020-01-18 | End: 2020-01-20

## 2020-01-18 RX ORDER — INSULIN GLARGINE 100 [IU]/ML
100 INJECTION, SOLUTION SUBCUTANEOUS NIGHTLY
Status: DISCONTINUED | OUTPATIENT
Start: 2020-01-18 | End: 2020-01-19

## 2020-01-18 RX ORDER — HEPARIN SODIUM 10000 [USP'U]/100ML
18.8 INJECTION, SOLUTION INTRAVENOUS CONTINUOUS
Status: DISCONTINUED | OUTPATIENT
Start: 2020-01-18 | End: 2020-01-20

## 2020-01-18 RX ADMIN — BUSPIRONE HYDROCHLORIDE 30 MG: 5 TABLET ORAL at 10:16

## 2020-01-18 RX ADMIN — BENZTROPINE MESYLATE 1 MG: 1 TABLET ORAL at 10:14

## 2020-01-18 RX ADMIN — PANTOPRAZOLE SODIUM 40 MG: 40 TABLET, DELAYED RELEASE ORAL at 10:17

## 2020-01-18 RX ADMIN — BUPRENORPHINE AND NALOXONE 1 FILM: 8; 2 FILM, SOLUBLE BUCCAL; SUBLINGUAL at 16:34

## 2020-01-18 RX ADMIN — HEPARIN SODIUM 2000 UNITS: 1000 INJECTION INTRAVENOUS; SUBCUTANEOUS at 18:13

## 2020-01-18 RX ADMIN — TIOTROPIUM BROMIDE 18 MCG: 18 CAPSULE ORAL; RESPIRATORY (INHALATION) at 08:32

## 2020-01-18 RX ADMIN — ATORVASTATIN CALCIUM 80 MG: 80 TABLET, FILM COATED ORAL at 21:19

## 2020-01-18 RX ADMIN — Medication 250 MG: at 21:19

## 2020-01-18 RX ADMIN — CARVEDILOL 3.12 MG: 3.12 TABLET, FILM COATED ORAL at 21:19

## 2020-01-18 RX ADMIN — ACETAMINOPHEN 650 MG: 325 TABLET ORAL at 16:52

## 2020-01-18 RX ADMIN — BUSPIRONE HYDROCHLORIDE 30 MG: 5 TABLET ORAL at 21:19

## 2020-01-18 RX ADMIN — POTASSIUM CHLORIDE 20 MEQ: 20 TABLET, EXTENDED RELEASE ORAL at 21:19

## 2020-01-18 RX ADMIN — HEPARIN SODIUM 4000 UNITS: 1000 INJECTION INTRAVENOUS; SUBCUTANEOUS at 02:23

## 2020-01-18 RX ADMIN — ACETAMINOPHEN 650 MG: 325 TABLET ORAL at 04:55

## 2020-01-18 RX ADMIN — Medication 250 MG: at 10:15

## 2020-01-18 RX ADMIN — LAMOTRIGINE 150 MG: 100 TABLET ORAL at 21:19

## 2020-01-18 RX ADMIN — POTASSIUM CHLORIDE 20 MEQ: 20 TABLET, EXTENDED RELEASE ORAL at 11:30

## 2020-01-18 RX ADMIN — MAGNESIUM OXIDE TAB 400 MG (241.3 MG ELEMENTAL MG) 400 MG: 400 (241.3 MG) TAB at 10:14

## 2020-01-18 RX ADMIN — SPIRONOLACTONE 100 MG: 100 TABLET ORAL at 10:16

## 2020-01-18 RX ADMIN — ARIPIPRAZOLE 10 MG: 10 TABLET ORAL at 10:16

## 2020-01-18 RX ADMIN — LAMOTRIGINE 150 MG: 100 TABLET ORAL at 10:15

## 2020-01-18 RX ADMIN — HEPARIN SODIUM 2000 UNITS: 1000 INJECTION INTRAVENOUS; SUBCUTANEOUS at 11:39

## 2020-01-18 RX ADMIN — PANTOPRAZOLE SODIUM 40 MG: 40 TABLET, DELAYED RELEASE ORAL at 16:34

## 2020-01-18 RX ADMIN — TORSEMIDE 100 MG: 100 TABLET ORAL at 10:14

## 2020-01-18 RX ADMIN — INSULIN LISPRO 4 UNITS: 100 INJECTION, SOLUTION INTRAVENOUS; SUBCUTANEOUS at 18:16

## 2020-01-18 RX ADMIN — CLOPIDOGREL BISULFATE 75 MG: 75 TABLET ORAL at 10:15

## 2020-01-18 RX ADMIN — ASPIRIN 81 MG: 81 TABLET, COATED ORAL at 10:20

## 2020-01-18 RX ADMIN — ACETAMINOPHEN 650 MG: 325 TABLET ORAL at 21:19

## 2020-01-18 RX ADMIN — METOLAZONE 2.5 MG: 2.5 TABLET ORAL at 10:16

## 2020-01-18 ASSESSMENT — PAIN SCALES - GENERAL
PAINLEVEL_OUTOF10: 2
PAINLEVEL_OUTOF10: 7
PAINLEVEL_OUTOF10: 7
PAINLEVEL_OUTOF10: 6

## 2020-01-18 NOTE — CONSULTS
Cardiovascular Consultation     Attending Physician: Jadiel Burden DO    PATIENT: Rogelio Sánchez  : 1965  MRN: [id number]    Reason for Consultation:   Chief Complaint   Patient presents with    Chest Pain     pt sts\" i think i had a heart attack this morning, i took a nitro and it helped and it was a pain i have never felt\" pain was all through chest and back       History of present illness:    Rogelio Sánchez is a 63 y/o female well known to [address] [facility] and was seen two days ago in follow up for recent hospitalization for decompensated HF complicated by LLE cellulitis and Acute on CKD. Recent blood work  shows improvement in renal function and Henrietta  denies shortness of breath/PND/orthopnea. Trace pedal edema. CardioMEMS earlier this week were good waveforms with elevated PAD. Poppy's Thersa Salines has been held and she continued on diuretics. She reports abrupt onset chest pain yesterday morning at 930 am when getting ready to go to appointment with Dr. Guanaco Noel. Denies obvious triggger. Says that previous MI in 2017 was preceded by two days' duration of chills/sweats/cough/SOB and that this was her first real episode of substernal CP. Reports associated N/V. Reports improvement with Nitro SL. This morning denies chest pain. On Heparin gtt.      Medical History:      Diagnosis Date    Anxiety and depression     CAD (coronary artery disease) 2018    Cardiomyopathy ([address]     CHF (congestive heart failure) ([address]     COPD (chronic obstructive pulmonary disease) ([address]     Diabetes mellitus ([address]     GERD (gastroesophageal reflux disease)     Hyperlipidemia     Hypertension     Hypotension     MI (myocardial infarction) ([address]     Peripheral neuropathy     Peripheral vascular disease ([address]     Pulmonary HTN ([address]     Reflux     Sleep apnea     has never done sleep study;does not use CPAP    Wears glasses     for reading       Surgical History: Physical activity:     Days per week: Not on file     Minutes per session: Not on file    Stress: Not on file   Relationships    Social connections:     Talks on phone: Not on file     Gets together: Not on file     Attends [employer] service: Not on file     Active member of club or organization: Not on file     Attends meetings of clubs or organizations: Not on file     Relationship status: Not on file    Intimate partner violence:     Fear of current or ex partner: Not on file     Emotionally abused: Not on file     Physically abused: Not on file     Forced sexual activity: Not on file   Other Topics Concern    Not on file   Social History Narrative    Not on file        Family History:  No evidence for sudden cardiac death or premature CAD.       Problem Relation Age of Onset    Heart Disease Maternal Grandmother     Cancer Mother         lung and brain cancer    Cancer Maternal Aunt         lung and brain cancer       Medications:  heparin 25,000 units in dextrose 5% 250 mL infusion, Continuous  lamoTRIgine (LAMICTAL) tablet 150 mg, BID  ARIPiprazole (ABILIFY) tablet 10 mg, Daily  aspirin EC tablet 81 mg, Daily  atorvastatin (LIPITOR) tablet 80 mg, Nightly  busPIRone (BUSPAR) tablet 30 mg, BID  magnesium oxide (MAG-OX) tablet 400 mg, Daily  clopidogrel (PLAVIX) tablet 75 mg, Daily  pantoprazole (PROTONIX) tablet 40 mg, BID  vitamin D (ERGOCALCIFEROL) capsule 50,000 Units, Weekly  carvedilol (COREG) tablet 3.125 mg, BID  benztropine (COGENTIN) tablet 1 mg, Daily  saccharomyces boulardii (FLORASTOR) capsule 250 mg, BID  torsemide (DEMADEX) tablet 100 mg, BID  spironolactone (ALDACTONE) tablet 100 mg, BID  metOLazone (ZAROXOLYN) tablet 2.5 mg, Daily  nitroGLYCERIN (NITROSTAT) SL tablet 0.4 mg, Q5 Min PRN  sodium chloride flush 0.9 % injection 10 mL, 2 times per day  sodium chloride flush 0.9 % injection 10 mL, PRN  magnesium hydroxide (MILK OF MAGNESIA) 400 MG/5ML suspension 30 mL, Daily PRN  ondansetron (ZOFRAN) injection 4 mg, Q6H PRN  heparin (porcine) injection 4,000 Units, PRN  heparin (porcine) injection 2,000 Units, PRN  insulin glargine (LANTUS) injection vial 100 Units, Nightly  tiotropium (SPIRIVA) inhalation capsule 18 mcg, Daily  glucose (GLUTOSE) 40 % oral gel 15 g, PRN  dextrose 50 % IV solution, PRN  glucagon (rDNA) injection 1 mg, PRN  dextrose 5 % solution, PRN  insulin lispro (HUMALOG) injection vial 0-12 Units, TID WC  insulin lispro (HUMALOG) injection vial 0-6 Units, Nightly  albuterol sulfate  (90 Base) MCG/ACT inhaler 2 puff, Q4H PRN  acetaminophen (TYLENOL) tablet 650 mg, Q4H PRN  nicotine (NICODERM CQ) 14 MG/24HR 1 patch, Daily        Allergies:  Lisinopril     Review of Systems:   [x]Full ROS obtained and negative except as mentioned in HPI    Physical Examination:    BP 96/61   Pulse 86   Temp 97.9 °F (36.6 °C) (Oral)   Resp 18   Ht 5' 4.5\" (1.638 m)   Wt 238 lb 9.6 oz (108.2 kg)   LMP 10/24/2012   SpO2 93%   BMI 40.32 kg/m²   Wt Readings from Last 3 Encounters:   01/18/20 238 lb 9.6 oz (108.2 kg)   01/17/20 245 lb (111.1 kg)   01/16/20 251 lb (113.9 kg)       GENERAL: Well developed, well nourished, no acute distress  NEUROLOGICAL: Alert and oriented x3  PSYCH: Normal mood and affect   SKIN: Warm and dry, without lesions  HEENT: Normocephalic, atraumatic, Sclera non-icteric, mucous membranes moist  NECK: supple, JVP normal, thyroid not enlarged   CAROTID: Normal upstroke, no bruits  CARDIAC: Normal PMI, regular rate and rhythm, normal S1S2, no murmur, rub  RESPIRATORY: Normal respiratory effort, clear to auscultation bilaterally  EXTREMITIES: No cyanosis, clubbing or edema, palpable pulses bilaterally   MUSCULOSKELETAL: No joint swelling or tenderness, no chest wall tenderness  GASTROINTESTINAL:  soft, non-tender, no bruit    Labs:  Lab Review   Lab Results   Component Value Date     01/17/2020    K 3.9 01/17/2020    K 3.3 01/10/2020    CL 91 01/17/2020    CO2 31 abuse  GERD  Anxiety        Hold Entresto and continue the Torsemide 100 mg twice daily, Spironolactone 100 mg twice daily and Metolazone 2.5 mg daily  Continue Aspirin, Atorvastatin, Carvedilol and Heparin gtt. Plan for left heart catheterization +/- PCI Monday 1/20/20 pending renal function and clinical course. No immediate indication for heart cath and would like to see continued improvement in renal function unless angina becomes unstable. Risks, benefits, goals, and alternatives of cardiac catheterization discussed with patient. All questions answered and informed consent obtained. Thank you for allowing me to participate in the care of your patient. Please do not hesitate to call. Lesley Bernard D.O., [facility]  Interventional Cardiology     o: [phone]  [address] [place] [address]., [address], [address]        NOTE:  This report was transcribed using voice recognition software. Every effort was made to ensure accuracy; however, inadvertent computerized transcription errors may be present.

## 2020-01-18 NOTE — PROGRESS NOTES
4 Eyes Skin Assessment      The patient is being assess for  Admission    I agree that 2 RN's have performed a thorough Head to Toe Skin Assessment on the patient. ALL assessment sites listed below have been assessed. Areas assessed by both nurses: Eulas Soulier and [place]  [x]   Head, Face, and Ears   [x]   Shoulders, Back, and Chest  [x]   Arms, Elbows, and Hands   [x]   Coccyx, Sacrum, and Ischum  [x]   Legs, Feet, and Heels- Diabetic Ulcers        Does the Patient have Skin Breakdown?   Yes a wound was noted on the Admission Assessment and an WOUND LDA was Initiated documentation include the Shaye-wound, Wound Assessment, Measurements, Dressing Treatment, Drainage, and Color\",         Vivek Prevention initiated:  Yes   Wound Care Orders initiated:  Yes      [address]  nurse consulted for Pressure Injury (Stage 3,4, Unstageable, DTI, NWPT, and Complex wounds):  No      Nurse 1 eSignature: Electronically signed by Seth Bojorquez RN on 1/17/20 at 7:47 PM    **SHARE this note so that the co-signing nurse is able to place an eSignature**    Nurse 2 eSignature: {Esignature:[id number]}

## 2020-01-18 NOTE — PROGRESS NOTES
RESPIRATORY THERAPY ASSESSMENT    Name:  Ronald Guerrero  Medical Record Number:  [id number]  Age: 62 y.o. Gender: female  : 1965  Today's Date:  2020  Room:  03600360-01    Assessment     Is the patient being admitted for a COPD or Asthma exacerbation? No   (If yes the patient will be seen every 4 hours for the first 24 hours and then reassessed)    Patient Admission Diagnosis      Allergies  Allergies   Allergen Reactions    Lisinopril Other (See Comments)     Severe hypotension       Minimum Predicted Vital Capacity:     825       Actual Vital Capacity:      1500              Pulmonary History:former smoker  Home Oxygen Therapy:  room air  Home Respiratory Therapy:Albuterol PRN,spiriva daily  Current Respiratory Therapy:  Alb q4prn,spiriva          Respiratory Severity Index(RSI)   Patients with orders for inhalation medications, oxygen, or any therapeutic treatment modality will be placed on Respiratory Protocol. They will be assessed with the first treatment and at least every 72 hours thereafter. The following severity scale will be used to determine frequency of treatment intervention.     Smoking History: Smoking History Less than 1ppd or less than 15 pack year = 1    Social History  Social History     Tobacco Use    Smoking status: Former Smoker     Packs/day: 0.25     Years: 30.00     Pack years: 7.50     Types: Cigarettes     Last attempt to quit: 2019     Years since quittin.4    Smokeless tobacco: Never Used   Substance Use Topics    Alcohol use: No    Drug use: Never       Recent Surgical History: None = 0  Past Surgical History  Past Surgical History:   Procedure Laterality Date    ARTERIAL BYPASS SURGRY Left 2016    Axillary/Subclavian to Brachial Bypass w/reversed SVG    CARDIAC [facility]  [facility]      pancreatitis post surgery   [facility] contacted. 4. If the bronchospasm improves, the frequency of the bronchodilator can be decreased, based on the patient's RSI, but not less than home treatment regimen frequency. 5. Bronchodilator(s) will be discontinued if patient has a RSI less than 9 and has received no scheduled or as needed treatment for 72  Hrs. Patients Ordered on a Mucolytic Agent:    1. Must always be administered with a bronchodilator. 2. Discontinue if patient experiences worsened bronchospasm, or secretions have lessened to the point that the patient is able to clear them with a cough. Anti-inflammatory and Combination Medications:    1. If the patient lacks prior history of lung disease, is not using inhaled anti-inflammatory medication at home, and lacks wheezing by examination or by history for at least 24 hours, contact physician for possible discontinuation.

## 2020-01-18 NOTE — PROGRESS NOTES
Monday. - Continue Tele.     2. Peripheral edema  - Subjectively improved today with less tenderness according to patient. - Continue diuretics. - Must consider balance between maintaining kidney function and assisting with fluid balance.     3. Chronic kidney disease, stage 3, recent SHAUNNA  - Was recently admitted for SHAUNNA with last creatine on discharge 3.2 (1/12/20). - Creatinine from 1.8 to 1.9.  - Stopped fluid restriction, may require gentle IV fluids if increased tomorrow. - Continue morning labs. - Avoid nephrotoxins. 4. Hyperkalemia  - 20 mEq Klor-Con today twice, will recheck tomorrow morning. 5. Diabetic Foot Ulcers  - Wound care to manage. Does not seem infected or gangrenous.     DVT Prophylaxis: Heparin  Diet: Diet NPO, After Midnight  DIET CARDIAC; No Caffeine  Code Status: Full Code  PT/OT Eval Status: n/a    Dispo - Inpatient    This patient was seen and evaluated with resident team and attending, Dr. Therese Ngo DO  Family Medicine Resident PGY1

## 2020-01-18 NOTE — PROGRESS NOTES
Inpatient Family Medicine Progress Note      PCP: Maikol Lopez PA-C    Date of Admission: 1/17/2020    Chief Complaint:  Chest pain    Hospital Course: 62 y.o. female who presented to [facility] with strong chest pain that started around 0930 on 01/17. Started when she was getting ready to go to see Dr Janet Jonas [address] like something sitting on her chest. Pain was 10/10, associated with shortness of breath and nausea. Took 2 Nitro at home which helped. Admitted to some pain with breathing and when walking x 2 days as well as some back pain. Has a history of previous MI, but she does not remember her previous MI. Denies any recent injuries but admits to falling last week but there was no pain at that time. Has had 3 MIs with 6 stents. Has had CardioMEMs. Was recently admitted with acute on chronic kidney disease, as well as ulcers on both feet. Subjective: Patient states that they are doing well with no new or acute concerns. Pain is improved, primarily on right lower back that wraps around to bottom of rib cage. Cardiology saw patient and informed that they want to do a cath on Monday. Legs are less tender. Denies fever, chills, chest pain, shortness of breath, nausea, vomiting, or diarrhea.        Medications:  Reviewed    Infusion Medications    heparin (porcine) 16.6 mL/hr (01/18/20 1141)    dextrose       Scheduled Medications    buprenorphine-naloxone  1 Film Sublingual Q12H    insulin glargine  100 Units Subcutaneous Nightly    potassium chloride  20 mEq Oral BID    lamoTRIgine  150 mg Oral BID    ARIPiprazole  10 mg Oral Daily    aspirin EC  81 mg Oral Daily    atorvastatin  80 mg Oral Nightly    busPIRone  30 mg Oral BID    magnesium oxide  400 mg Oral Daily    clopidogrel  75 mg Oral Daily    pantoprazole  40 mg Oral BID    vitamin D  50,000 Units Oral Weekly    carvedilol  3.125 mg Oral BID    benztropine  1 mg Oral Daily    saccharomyces boulardii  250 mg Oral BID    torsemide  100 mg Oral BID    spironolactone  100 mg Oral BID    metOLazone  2.5 mg Oral Daily    sodium chloride flush  10 mL Intravenous 2 times per day    tiotropium  18 mcg Inhalation Daily    insulin lispro  0-12 Units Subcutaneous TID WC    insulin lispro  0-6 Units Subcutaneous Nightly    nicotine  1 patch Transdermal Daily     PRN Meds: nitroGLYCERIN, sodium chloride flush, magnesium hydroxide, ondansetron, heparin (porcine), heparin (porcine), glucose, dextrose, glucagon (rDNA), dextrose, albuterol sulfate HFA, acetaminophen      Intake/Output Summary (Last 24 hours) at 1/18/2020 1313  Last data filed at 1/18/2020 0909  Gross per 24 hour   Intake 880 ml   Output 0 ml   Net 880 ml       Physical Exam Performed:    BP (!) 94/58   Pulse 84   Temp 98.2 °F (36.8 °C) (Oral)   Resp 18   Ht 5' 4.5\" (1.638 m)   Wt 238 lb 9.6 oz (108.2 kg)   LMP 10/24/2012   SpO2 94%   BMI 40.32 kg/m²     Physical Exam  Vitals signs reviewed. Constitutional:       General: She is not in acute distress. Appearance: Normal appearance. She is obese. She is not ill-appearing. HENT:      Head: Normocephalic and atraumatic. Eyes:      Conjunctiva/sclera: Conjunctivae normal.   Cardiovascular:      Rate and Rhythm: Normal rate and regular rhythm. Heart sounds: No murmur. No friction rub. No gallop. Pulmonary:      Effort: Pulmonary effort is normal.      Breath sounds: No wheezing, rhonchi or rales. Chest:      Chest wall: Tenderness (along lower boarder of right ribs, wrapping around to back) present. Abdominal:      General: Abdomen is flat. Bowel sounds are normal.      Palpations: Abdomen is soft. Tenderness: There is no guarding or rebound. Musculoskeletal:         General: Tenderness (lower extremeties, mild) present. Right lower leg: Edema (+3 pitting) present. Left lower leg: Edema (+3 pitting) present. Skin:     General: Skin is warm and dry.       Findings: Erythema ( lower extremities consistent with venous stasis) present. Comments: Small but clean ulcers noted on patient's bilateral toes. Neurological:      General: No focal deficit present. Mental Status: She is alert and oriented to person, place, and time. Psychiatric:         Mood and Affect: Mood normal.         Behavior: Behavior normal.           Labs:   Recent Labs     01/17/20  1315 01/18/20  0842   WBC 12.1* 9.4   HGB 10.1* 10.0*   HCT 30.6* 30.2*    431     Recent Labs     01/16/20  0800 01/17/20  1315 01/18/20  0842   * 136 136   K 3.7 3.9 3.4*   CL 93* 91* 91*   CO2 27 31 31   BUN 52* 50* 51*   CREATININE 1.8* 1.8* 1.9*   CALCIUM 9.2 9.7 9.6     Recent Labs     01/17/20  1315 01/18/20  0842   AST 7* 11*   ALT 8* <5*   BILITOT 0.3 0.4   ALKPHOS 109 97     No results for input(s): INR in the last 72 hours. Recent Labs     01/17/20  1315 01/17/20  1745 01/17/20 2037   TROPONINI 0.01 <0.01 0.01       Urinalysis:      Lab Results   Component Value Date    NITRU Negative 01/17/2020    WBCUA 3-5 09/28/2019    BACTERIA Rare 09/28/2019    RBCUA None seen 09/28/2019    BLOODU Negative 01/17/2020    SPECGRAV 1.010 01/17/2020    GLUCOSEU Negative 01/17/2020       Radiology:  XR CHEST STANDARD (2 VW)   Final Result   Cardiomegaly without evidence of acute cardiopulmonary process.                  Assessment/Plan:    Active Hospital Problems    Diagnosis Date Noted    Chest pain [R07.9] 01/17/2020     Priority: High    Peripheral edema [R60.9] 01/17/2020     Priority: Medium    Hypokalemia [E87.6] 04/08/2019     Priority: Low    CKD (chronic kidney disease) stage 3, GFR 30-59 ml/min ([facility] [N18.3] 01/09/2019     Priority: Low    Diabetic ulcer of toe of right foot associated with type 2 diabetes mellitus ([facility] [V46.552, L97.519] 01/18/2020    Diabetic ulcer of left foot associated with type 2 diabetes mellitus ([facility] [L56.191, L97.529] 01/18/2020    Hypoglycemia associated with type 2 diabetes mellitus ([facility] [E11.649] 01/18/2020     1. Chest pain  - Pain improved today. - Troponins negative. - Cardiology to do left heart cath on Monday. - Continue Tele.     2. Peripheral edema  - Subjectively improved today with less tenderness according to patient. - Continue diuretics. - Must consider balance between maintaining kidney function and assisting with fluid balance.     3. Chronic kidney disease, stage 3, recent SHAUNNA  - Was recently admitted for SHAUNNA with last creatine on discharge 3.2 (1/12/20). - Creatinine from 1.8 to 1.9.  - Stopped fluid restriction, may require gentle IV fluids if increased tomorrow. - Continue morning labs. - Avoid nephrotoxins. 4. Hyperkalemia  - 20 mEq Klor-Con today twice, will recheck tomorrow morning. 5. Hypoglycemia  - Blood sugars down to 58 last evening, improved as of this morning.  - Lantus held last night and will hold again tonight. - Will evaluate whether to restart Lantus and at what level pending results. 6. Diabetic Foot Ulcers  - Wound care to manage. Does not seem infected or gangrenous.     DVT Prophylaxis: Heparin  Diet: Diet NPO, After Midnight  DIET CARDIAC; No Caffeine  Code Status: Full Code  PT/OT Eval Status: n/a    Dispo - Inpatient    This patient was seen and evaluated with resident team and attending, Dr. Jacklyn Escobar, DO  Family Medicine Resident PGY1

## 2020-01-18 NOTE — PROGRESS NOTES
Outpatient Follow Up Note    Brandie Bhat is 62 y.o. female who presents today for  follow up regarding:    Chief Complaint   Patient presents with    Other     patient here left le pain/possitble intervention. arterial stenosis. pamlr       Patient with history of PVD with multiple prior BLE interventions. She ultimately underwent right fem pop 5/2019 and LLE SFA stenting for a . She reports worsening LLE claudication and constant left foot pain. She was recently hospitalized with CHF and leg swelling/cellulitis. She was scheduled for oupt Vascular testing and is here to go over results. She also notes episode of severe Chest pain this am- substernal.  She took one NTG tablet with little relief but then took another with some relief. Pain is improved but still present. She does have a history of CAD s/p PCI.     Past Medical History:   Diagnosis Date    Anxiety and depression     CAD (coronary artery disease) 01/2018    Cardiomyopathy ([address]     CHF (congestive heart failure) (HCC)     COPD (chronic obstructive pulmonary disease) (HCC)     Diabetes mellitus ([address]     GERD (gastroesophageal reflux disease)     Hyperlipidemia     Hypertension     Hypotension     MI (myocardial infarction) ([address]     Peripheral neuropathy     Peripheral vascular disease ([address]     Pulmonary HTN ([address]     Reflux     Sleep apnea     has never done sleep study;does not use CPAP    Wears glasses     for reading       Past Surgical History:   Procedure Laterality Date    ARTERIAL BYPASS SURGRY Left 01/06/2016    Axillary/Subclavian to Brachial Bypass w/reversed SVG    [facility]  [facility]27/2018    [facility]      pancreatitis post surgery    CORONARY ANGIOPLASTY WITH STENT PLACEMENT  03/16/2018    MELODY- 3.5 x 38 and 3.5 x 20 to Cx    CORONARY ANGIOPLASTY WITH STENT PLACEMENT  01/03/2018    MELODY- 3.0 x 38 and 2.75 x 26 and 2.75 x 8 and 2.75 x mg Oral Daily Vermell Counter, DO   400 mg at 01/18/20 1014    clopidogrel (PLAVIX) tablet 75 mg  75 mg Oral Daily Vermell Counter, DO   75 mg at 01/18/20 1015    pantoprazole (PROTONIX) tablet 40 mg  40 mg Oral BID Vermell Counter, DO   40 mg at 01/18/20 1017    vitamin D (ERGOCALCIFEROL) capsule 50,000 Units  50,000 Units Oral Weekly Vermell Counter, DO        carvedilol (COREG) tablet 3.125 mg  3.125 mg Oral BID Vermell Counter, DO   3.125 mg at 01/17/20 2057    benztropine (COGENTIN) tablet 1 mg  1 mg Oral Daily Vermell Counter, DO   1 mg at 01/18/20 1014    saccharomyces boulardii (FLORASTOR) capsule 250 mg  250 mg Oral BID Vermell Counter, DO   250 mg at 01/18/20 1015    torsemide (DEMADEX) tablet 100 mg  100 mg Oral BID Vermell Counter, DO   100 mg at 01/18/20 1014    spironolactone (ALDACTONE) tablet 100 mg  100 mg Oral BID Vermell Counter, DO   100 mg at 01/18/20 1016    metOLazone (ZAROXOLYN) tablet 2.5 mg  2.5 mg Oral Daily Vermell Counter, DO   2.5 mg at 01/18/20 1016    nitroGLYCERIN (NITROSTAT) SL tablet 0.4 mg  0.4 mg Sublingual Q5 Min PRN Vermell Counter, DO        sodium chloride flush 0.9 % injection 10 mL  10 mL Intravenous 2 times per day Vermell Counter, DO        sodium chloride flush 0.9 % injection 10 mL  10 mL Intravenous PRN Vermell Counter, DO        magnesium hydroxide (MILK OF MAGNESIA) 400 MG/5ML suspension 30 mL  30 mL Oral Daily PRN Vermell Counter, DO        ondansetron [facility]) injection 4 mg  4 mg Intravenous Q6H PRN Vermell Counter, DO        heparin (porcine) injection 4,000 Units  4,000 Units Intravenous PRN Vermell Counter, DO   4,000 Units at 01/18/20 0223    heparin (porcine) injection 2,000 Units  2,000 Units Intravenous PRN Vermell Counter, DO        insulin glargine (LANTUS) injection vial 100 Units  100 Units Subcutaneous Nightly Vermell Counter, DO        tiotropium [facility]) inhalation capsule 18 mcg  18 mcg Inhalation Daily Vermell Counter, DO   18 mcg at 01/18/20 0832    glucose (GLUTOSE) 40 % oral gel 15 g  15 g Oral PRN Delma Vaughn, DO        dextrose 50 % IV solution  12.5 g Intravenous PRN Josepha Roderick, DO        glucagon (rDNA) injection 1 mg  1 mg Intramuscular PRN Delma Vaughn, DO        dextrose 5 % solution  100 mL/hr Intravenous PRN Josepha Drshamir, DO        insulin lispro (HUMALOG) injection vial 0-12 Units  0-12 Units Subcutaneous TID  Julia-Juan Luis Sneed, DO        insulin lispro (HUMALOG) injection vial 0-6 Units  0-6 Units Subcutaneous Nightly Laquetta Drshamir, DO        albuterol sulfate  (90 Base) MCG/ACT inhaler 2 puff  2 puff Inhalation Q4H PRN Tenisha Thomas, DO        acetaminophen (TYLENOL) tablet 650 mg  650 mg Oral Q4H PRN Dwayne Bernard DO   650 mg at 01/18/20 0455    nicotine (NICODERM CQ) 14 MG/24HR 1 patch  1 patch Transdermal Daily Dwayne Bernard DO         Allergies:  Lisinopril    REVIEW OF SYSTEMS:   A 14 point review of systems was completed. Pertinent positives identified in the HPI, all other review of systems negative. Objective:   PHYSICAL EXAM:        VITALS:  /62 (Site: Right Upper Arm)   Ht 5' 6\" (1.676 m)   Wt 245 lb (111.1 kg)   LMP 10/24/2012   BMI 39.54 kg/m²   CONSTITUTIONAL: Cooperative, no apparent distress, and appears well nourished / developed  NEUROLOGIC:  Awake and oriented to person, place and time. PSYCH: Calm affect. SKIN: Warm and dry. HEENT: Sclera non-icteric, normocephalic, neck supple, normal carotid pulses with no bruits and thyroid normal size. LUNGS:  No increased work of breathing and clear to auscultation, no crackles or wheezing  CARDIOVASCULAR:  Regular rate and rhythm with no murmurs, gallops, rubs, or abnormal heart sounds, normal PMI. Pulses:    femoral DP PT   RIGHT 2 2 2   LEFT 2 doppler doppler     ABDOMEN:  Normal bowel sounds, non-distended and non-tender to palpation  EXT: Bilateral 2-3+ edema. Left foot with rubor. No ulcers or wounds.      DATA:       A  Impressions   Right Impression   The right ankle/brachial

## 2020-01-19 LAB
A/G RATIO: 0.9 (ref 1.1–2.2)
ALBUMIN SERPL-MCNC: 3.4 G/DL (ref 3.4–5)
ALP BLD-CCNC: 96 U/L (ref 40–129)
ALT SERPL-CCNC: 7 U/L (ref 10–40)
ANION GAP SERPL CALCULATED.3IONS-SCNC: 12 MMOL/L (ref 3–16)
APTT: 54.3 SEC (ref 24.2–36.2)
APTT: 58.5 SEC (ref 24.2–36.2)
AST SERPL-CCNC: 9 U/L (ref 15–37)
BASOPHILS ABSOLUTE: 0.1 K/UL (ref 0–0.2)
BASOPHILS RELATIVE PERCENT: 0.5 %
BILIRUB SERPL-MCNC: 0.4 MG/DL (ref 0–1)
BUN BLDV-MCNC: 53 MG/DL (ref 7–20)
CALCIUM SERPL-MCNC: 9.2 MG/DL (ref 8.3–10.6)
CHLORIDE BLD-SCNC: 85 MMOL/L (ref 99–110)
CO2: 29 MMOL/L (ref 21–32)
CREAT SERPL-MCNC: 2 MG/DL (ref 0.6–1.1)
EOSINOPHILS ABSOLUTE: 0.3 K/UL (ref 0–0.6)
EOSINOPHILS RELATIVE PERCENT: 2.5 %
GFR AFRICAN AMERICAN: 31
GFR NON-AFRICAN AMERICAN: 26
GLOBULIN: 3.8 G/DL
GLUCOSE BLD-MCNC: 152 MG/DL (ref 70–99)
GLUCOSE BLD-MCNC: 159 MG/DL (ref 70–99)
GLUCOSE BLD-MCNC: 166 MG/DL (ref 70–99)
GLUCOSE BLD-MCNC: 193 MG/DL (ref 70–99)
GLUCOSE BLD-MCNC: 227 MG/DL (ref 70–99)
HCT VFR BLD CALC: 29.8 % (ref 36–48)
HEMOGLOBIN: 10 G/DL (ref 12–16)
LYMPHOCYTES ABSOLUTE: 1.1 K/UL (ref 1–5.1)
LYMPHOCYTES RELATIVE PERCENT: 10 %
MCH RBC QN AUTO: 29.7 PG (ref 26–34)
MCHC RBC AUTO-ENTMCNC: 33.4 G/DL (ref 31–36)
MCV RBC AUTO: 88.8 FL (ref 80–100)
MONOCYTES ABSOLUTE: 0.7 K/UL (ref 0–1.3)
MONOCYTES RELATIVE PERCENT: 6.3 %
NEUTROPHILS ABSOLUTE: 8.6 K/UL (ref 1.7–7.7)
NEUTROPHILS RELATIVE PERCENT: 80.7 %
PDW BLD-RTO: 15.4 % (ref 12.4–15.4)
PERFORMED ON: ABNORMAL
PLATELET # BLD: 404 K/UL (ref 135–450)
PMV BLD AUTO: 8.5 FL (ref 5–10.5)
POTASSIUM REFLEX MAGNESIUM: 3.8 MMOL/L (ref 3.5–5.1)
RBC # BLD: 3.36 M/UL (ref 4–5.2)
SODIUM BLD-SCNC: 126 MMOL/L (ref 136–145)
TOTAL PROTEIN: 7.2 G/DL (ref 6.4–8.2)
WBC # BLD: 10.7 K/UL (ref 4–11)

## 2020-01-19 PROCEDURE — 6370000000 HC RX 637 (ALT 250 FOR IP): Performed by: STUDENT IN AN ORGANIZED HEALTH CARE EDUCATION/TRAINING PROGRAM

## 2020-01-19 PROCEDURE — 36415 COLL VENOUS BLD VENIPUNCTURE: CPT

## 2020-01-19 PROCEDURE — 99233 SBSQ HOSP IP/OBS HIGH 50: CPT | Performed by: NURSE PRACTITIONER

## 2020-01-19 PROCEDURE — 94640 AIRWAY INHALATION TREATMENT: CPT

## 2020-01-19 PROCEDURE — 1200000000 HC SEMI PRIVATE

## 2020-01-19 PROCEDURE — 85730 THROMBOPLASTIN TIME PARTIAL: CPT

## 2020-01-19 PROCEDURE — 80053 COMPREHEN METABOLIC PANEL: CPT

## 2020-01-19 PROCEDURE — 6360000002 HC RX W HCPCS: Performed by: FAMILY MEDICINE

## 2020-01-19 PROCEDURE — 85025 COMPLETE CBC W/AUTO DIFF WBC: CPT

## 2020-01-19 RX ORDER — INSULIN GLARGINE 100 [IU]/ML
5 INJECTION, SOLUTION SUBCUTANEOUS NIGHTLY
Status: DISCONTINUED | OUTPATIENT
Start: 2020-01-19 | End: 2020-01-21

## 2020-01-19 RX ADMIN — ACETAMINOPHEN 650 MG: 325 TABLET ORAL at 20:32

## 2020-01-19 RX ADMIN — POTASSIUM CHLORIDE 20 MEQ: 20 TABLET, EXTENDED RELEASE ORAL at 09:20

## 2020-01-19 RX ADMIN — MAGNESIUM OXIDE TAB 400 MG (241.3 MG ELEMENTAL MG) 400 MG: 400 (241.3 MG) TAB at 09:20

## 2020-01-19 RX ADMIN — BUPRENORPHINE AND NALOXONE 1 FILM: 8; 2 FILM, SOLUBLE BUCCAL; SUBLINGUAL at 04:50

## 2020-01-19 RX ADMIN — SPIRONOLACTONE 100 MG: 100 TABLET ORAL at 20:32

## 2020-01-19 RX ADMIN — PANTOPRAZOLE SODIUM 40 MG: 40 TABLET, DELAYED RELEASE ORAL at 09:20

## 2020-01-19 RX ADMIN — INSULIN LISPRO 4 UNITS: 100 INJECTION, SOLUTION INTRAVENOUS; SUBCUTANEOUS at 14:50

## 2020-01-19 RX ADMIN — SPIRONOLACTONE 100 MG: 100 TABLET ORAL at 10:27

## 2020-01-19 RX ADMIN — Medication 250 MG: at 20:32

## 2020-01-19 RX ADMIN — TIOTROPIUM BROMIDE 18 MCG: 18 CAPSULE ORAL; RESPIRATORY (INHALATION) at 07:59

## 2020-01-19 RX ADMIN — BUPRENORPHINE AND NALOXONE 1 FILM: 8; 2 FILM, SOLUBLE BUCCAL; SUBLINGUAL at 17:48

## 2020-01-19 RX ADMIN — LAMOTRIGINE 150 MG: 100 TABLET ORAL at 20:32

## 2020-01-19 RX ADMIN — CLOPIDOGREL BISULFATE 75 MG: 75 TABLET ORAL at 09:20

## 2020-01-19 RX ADMIN — HEPARIN SODIUM AND DEXTROSE 18.8 ML/HR: 10000; 5 INJECTION INTRAVENOUS at 09:30

## 2020-01-19 RX ADMIN — INSULIN GLARGINE 5 UNITS: 100 INJECTION, SOLUTION SUBCUTANEOUS at 21:56

## 2020-01-19 RX ADMIN — Medication 250 MG: at 09:29

## 2020-01-19 RX ADMIN — ARIPIPRAZOLE 10 MG: 10 TABLET ORAL at 09:19

## 2020-01-19 RX ADMIN — ATORVASTATIN CALCIUM 80 MG: 80 TABLET, FILM COATED ORAL at 20:32

## 2020-01-19 RX ADMIN — ASPIRIN 81 MG: 81 TABLET, COATED ORAL at 09:20

## 2020-01-19 RX ADMIN — LAMOTRIGINE 150 MG: 100 TABLET ORAL at 09:20

## 2020-01-19 RX ADMIN — BUSPIRONE HYDROCHLORIDE 30 MG: 5 TABLET ORAL at 09:19

## 2020-01-19 RX ADMIN — PANTOPRAZOLE SODIUM 40 MG: 40 TABLET, DELAYED RELEASE ORAL at 17:48

## 2020-01-19 RX ADMIN — INSULIN LISPRO 2 UNITS: 100 INJECTION, SOLUTION INTRAVENOUS; SUBCUTANEOUS at 17:49

## 2020-01-19 RX ADMIN — POTASSIUM CHLORIDE 20 MEQ: 20 TABLET, EXTENDED RELEASE ORAL at 20:32

## 2020-01-19 RX ADMIN — BUSPIRONE HYDROCHLORIDE 30 MG: 5 TABLET ORAL at 20:31

## 2020-01-19 RX ADMIN — INSULIN LISPRO 1 UNITS: 100 INJECTION, SOLUTION INTRAVENOUS; SUBCUTANEOUS at 20:43

## 2020-01-19 RX ADMIN — BENZTROPINE MESYLATE 1 MG: 1 TABLET ORAL at 09:19

## 2020-01-19 ASSESSMENT — PAIN SCALES - GENERAL
PAINLEVEL_OUTOF10: 5
PAINLEVEL_OUTOF10: 4

## 2020-01-19 NOTE — PROGRESS NOTES
[facility]   Daily Progress Note    Admit Date:  1/17/2020  HPI:    Chief Complaint   Patient presents with    Chest Pain     pt sts\" i think i had a heart attack this morning, i took a nitro and it helped and it was a pain i have never felt\" pain was all through chest and back      Presented from office to ED with chest pain, improved with sl nitro but not completely resolved. Troponin's negative for ischemia. ECG unremarkable. Patient well known to me with CAD, MR, sCHF, ICM, HLD, HTN as well as PAD, DM, CLINT and smoker (recently quit). She underwent placement of CardioMEMS on 3/97/56 without complication   Recently hospitalized with BLE edema, treated for cellulitis, diuresed then developed AoCKD. Diuretics were held then restarted 3 days post discharge. Subjective:  Ms. Rhonda Zepeda lying in bed, reports increased urination here, abdomen and legs less tight/ edematous. No shortness of breath. No further chest pain. Objective:   BP (!) 93/58   Pulse 89   Temp 97.7 °F (36.5 °C) (Oral)   Resp 20   Ht 5' 4.5\" (1.638 m)   Wt 237 lb 9.6 oz (107.8 kg)   LMP 10/24/2012   SpO2 97%   BMI 40.15 kg/m²       Intake/Output Summary (Last 24 hours) at 1/19/2020 0925  Last data filed at 1/19/2020 0538  Gross per 24 hour   Intake 705 ml   Output 2700 ml   Net -1995 ml     Wt Readings from Last 3 Encounters:   01/19/20 237 lb 9.6 oz (107.8 kg)   01/17/20 245 lb (111.1 kg)   01/16/20 251 lb (113.9 kg)         ASSESSMENT:   1. CAD - chest pain typical of angina, ECG and troponin negative for ischemia, started on IV Heparin, plan for LHC if renal function stable  2. ICM - EF 40-45%, on low dose BB but limited due to hypotension  3. CKD, acute on chronic - improved from last hospitalization but worse over past 24 hrs, good UOP, weight down  4. Hypotension - asymptomatic, hold Coreg  5. Hyponatremia - with other electrolyte abnormalities      PLAN:  1. Hold metolazone and torsemide today  2.  Continue the spironolactone 100 mg bid  3. Hold Coreg given hypotension and renal dysfunction  4. LHC tomorrow if renal function improved/ stable Lenrebecca Blanc, afternoon)  5. Consider nephrology consult if renal function continues to deteriorate  6. Continue IV heparin for now  7.  Asked patient to bring in her CardioMEMs pillcristel Lozada, APRN - CNP, 1/19/2020, 9:25 AM  [facility]   [phone]       Telemetry: SR 80's, PVC's in singles and pairs  NYHA: IV    Physical Exam:  General:  Awake, alert, NAD  Skin:  Warm and dry  Neck:  JVP 8-9 cm upright  Chest:  Clear to auscultation   Cardiovascular:  RRR, normal S1S2, no m/g/r  Abdomen:  Softer, nontender, +bowel sounds  Extremities:  2+ BLE edema        Medications:    buprenorphine-naloxone  1 Film Sublingual Q12H    insulin glargine  100 Units Subcutaneous Nightly    potassium chloride  20 mEq Oral BID    lamoTRIgine  150 mg Oral BID    ARIPiprazole  10 mg Oral Daily    aspirin EC  81 mg Oral Daily    atorvastatin  80 mg Oral Nightly    busPIRone  30 mg Oral BID    magnesium oxide  400 mg Oral Daily    clopidogrel  75 mg Oral Daily    pantoprazole  40 mg Oral BID    vitamin D  50,000 Units Oral Weekly    [Held by provider] carvedilol  3.125 mg Oral BID    benztropine  1 mg Oral Daily    saccharomyces boulardii  250 mg Oral BID    torsemide  100 mg Oral BID    spironolactone  100 mg Oral BID    [Held by provider] metOLazone  2.5 mg Oral Daily    sodium chloride flush  10 mL Intravenous 2 times per day    tiotropium  18 mcg Inhalation Daily    insulin lispro  0-12 Units Subcutaneous TID WC    insulin lispro  0-6 Units Subcutaneous Nightly    nicotine  1 patch Transdermal Daily      heparin (porcine) 18.8 mL/hr (01/18/20 1815)    dextrose         Lab Data: Lab results independently reviewed by myself 1/19/20   CBC:   Recent Labs     01/17/20  1315 01/18/20  0842 01/19/20  0640   WBC 12.1* 9.4 10.7   HGB 10.1* 10.0* 10.0*    431 404 BMP:    Recent Labs     01/17/20  1315 01/18/20  0842 01/19/20  0640    136 126*   K 3.9 3.4* 3.8   CO2 31 31 29   BUN 50* 51* 53*   CREATININE 1.8* 1.9* 2.0*     INR:  No results for input(s): INR in the last 72 hours. BNP:    Recent Labs     01/17/20  1315   PROBNP 4,052*     Cardiac Enzymes:   Recent Labs     01/17/20  1315 01/17/20  1745 01/17/20  2037   TROPONINI 0.01 <0.01 0.01     Lipids:   Lab Results   Component Value Date    TRIG 140 01/11/2020    TRIG 278 10/21/2019    HDL 27 01/11/2020    HDL 31 10/21/2019    LDLCALC 103 01/11/2020    LDLCALC 91 10/21/2019    LDLDIRECT 187 08/01/2017       Cardiac Imaging:    BLE ZACH's 4/18/19:   1. There is severe arterial insufficiency at rest involving the right lower    extremity. There occlusive disease of the right proximal superficial femoral    (noted stent) and mid posterior tibial arteries. There is a 50-74% stenosis    at 4the right deep femoral artery with evidence of inflow disease.    2. There is severe arterial insufficiency at rest involving the left lower    extremity. There is occlusive disease of the left proximal superficial    femoral artery (noted stent). There is a 30-49% stenosis at the left deep    femoral artery with evidence of inflow disease.         ECHO 4/3/19:   Annamaria Boys systolic function is mildly reduced with EF estimated at 40-45%.   There is severe HK of the apex and apical-septal segment.   Normal left ventricular diastolic filling pressure.   Mild mitral regurgitation.   Systolic pulmonary artery pressure (SPAP) estimated at 32mmHg (RA pressure 3mmHg). Arterial doppler studies 8/2/18:   Patrick Hoose is no arterial insufficiency at rest involving the lower extremities    bilaterally, however, there is evidence to suggest calf vessel disease    bilaterally. [facility] 3/26/18 [employer] promus premier [id number] CCx and [id number] CCx. Echo: 5/23/18:    Ejection fraction is visually estimated to be 30-35 %.    There is akinesis of the apex and inferior walls. Left ventricular size is mildly increased. Moderate mitral regurgitation. Moderate amount plaque is noted in the aorta. The left atrium is mildly dilated. 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 A bubble study was performed and fails to show evidence of shunting. Procedure performed: Transesophageal echocardiogram 5/25/18:    Findings:  Reduced left ventricular function with ejection fraction estimated at about 35% with apical and inferior akinesis    The cardiac valves show moderate mitral regurgitation  There is no evidence for an intracardiac shunt or intracardiac thrombus. Cardiac cath 5/25/18:   Procedure Performed:  1. Coronary angiogram  2. Left heart cath  3. Left ventriculogram  4. Right heart cath   Findings:  1. Patent LAD and CIRC stents              ~mild CAD  2. Reduced LVEF 30% with anterior and apical akinesis  3. Moderate pulmonary hyperension   Conclusion/plan of care:  1.  Medical management

## 2020-01-19 NOTE — PROGRESS NOTES
NOTE: Pt evaluated by Dr Yoel Giordano on 1/19. Note accidentally addended by another provider. Re-copied original note and signed below. 18 [facility] of [address] [place] [address]  PGY-2  (available by [employer])               Inpatient Family Medicine Progress Note        PCP: Lexi Amos PA-C     Date of Admission: 1/17/2020     Chief Complaint:  Chest pain     Hospital Course: 62 y.o. female who presented to [facility] with strong chest pain that started around 0930 on 01/17. Started when she was getting ready to go to see Dr Skylar Westfall like something sitting on her chest. Pain was 10/10, associated with shortness of breath and nausea.  Took 2 Nitro at home which helped.  Admitted to some pain with breathing and when walking x 2 days as well as some back pain.  Has a history of previous MI, but she does not remember her previous MI. Denies any recent injuries but admits to falling last week but there was no pain at that time. Has had 3 MIs with 6 stents. Has had CardioMEMs. Was recently admitted with acute on chronic kidney disease, as well as ulcers on both feet.     Subjective: Pt sitting up at bedside resting comfortably. Denies any issues or concerns. Denies any chest pain currently and reports that it resolved yesterday. Denies any other issues or concerns. Patient is reporting that heart cath to be performed tomorrow. Reports that her leg pain from LE swelling is improved.  Denies any fevers, chills, chest pain, SOB, nausea, vomiting, abdominal pain, diarrhea, constipation, or dysuria.      Medications:  Reviewed     Infusion Medications   Infusions Meds    heparin (porcine) 18.8 mL/hr (01/18/20 1815)    dextrose           Scheduled Medications   Scheduled Medications    buprenorphine-naloxone  1 Film Sublingual Q12H    insulin glargine  100 Units Subcutaneous Nightly    potassium chloride  20 mEq Oral BID    lamoTRIgine  150 mg Oral BID    ARIPiprazole 10 mg Oral Daily    aspirin EC  81 mg Oral Daily    atorvastatin  80 mg Oral Nightly    busPIRone  30 mg Oral BID    magnesium oxide  400 mg Oral Daily    clopidogrel  75 mg Oral Daily    pantoprazole  40 mg Oral BID    vitamin D  50,000 Units Oral Weekly    [Held by provider] carvedilol  3.125 mg Oral BID    benztropine  1 mg Oral Daily    saccharomyces boulardii  250 mg Oral BID    torsemide  100 mg Oral BID    spironolactone  100 mg Oral BID    [Held by provider] metOLazone  2.5 mg Oral Daily    sodium chloride flush  10 mL Intravenous 2 times per day    tiotropium  18 mcg Inhalation Daily    insulin lispro  0-12 Units Subcutaneous TID WC    insulin lispro  0-6 Units Subcutaneous Nightly    nicotine  1 patch Transdermal Daily         PRN Meds:   PRN Medications   nitroGLYCERIN, sodium chloride flush, magnesium hydroxide, ondansetron, heparin (porcine), heparin (porcine), glucose, dextrose, glucagon (rDNA), dextrose, albuterol sulfate HFA, acetaminophen           Intake/Output Summary (Last 24 hours) at 1/19/2020 0853  Last data filed at 1/19/2020 0538      Gross per 24 hour   Intake 1185 ml   Output 2700 ml   Net -1515 ml         Physical Exam Performed:     BP 96/65   Pulse 85   Temp 97.8 °F (36.6 °C) (Oral)   Resp 18   Ht 5' 4.5\" (1.638 m)   Wt 237 lb 9.6 oz (107.8 kg)   LMP 10/24/2012   SpO2 97%   BMI 40.15 kg/m²      Physical Exam  Vitals signs reviewed. Constitutional:       General: She is not in acute distress. Appearance: Normal appearance. She is obese. She is not ill-appearing. HENT:      Head: Normocephalic and atraumatic. Eyes:      Conjunctiva/sclera: Conjunctivae normal.   Cardiovascular:      Rate and Rhythm: Normal rate and regular rhythm. Heart sounds: No murmur. No friction rub. No gallop. Pulmonary:      Effort: Pulmonary effort is normal.      Breath sounds: No wheezing, rhonchi or rales. Abdominal:      General: Abdomen is flat.  Bowel sounds are mellitus ([facility] [J22.681, X08.709] 01/18/2020    Diabetic ulcer of left foot associated with type 2 diabetes mellitus ([facility] [S09.244, L97.529] 01/18/2020    Hypoglycemia associated with type 2 diabetes mellitus ([facility] [E11.649] 01/18/2020    Chest pain [R07.9] 01/17/2020    Peripheral edema [R60.9] 01/17/2020    Hypokalemia [E87.6] 04/08/2019    CKD (chronic kidney disease) stage 3, GFR 30-59 ml/min (HCC) [N18.3] 01/09/2019      Chest pain  - Resolved  - Troponins negative on admission   - Cardiology to do left heart cath on Monday. Per cardiology, holding Entresto and Coreg (secondary to hypotension and renal dysfunction)     Peripheral edema  - Improved  - Per cardiology, holding Metolazone and Torsemide today (1/18)M   - Continue Spironolactone 100mg BID     Chronic kidney disease, stage 3, recent SHAUNNA  - Was recently admitted for SHAUNNA with last creatine on discharge 3.2 (1/12/20)  - Creatinine 1.8 --> 1.9 --> 2.0 today   - Avoid nephrotoxin      Hyperkalemia  - Resolved  - K 3.4 -- >3.8 today  - s/p 20 mEq Klor-Con x2 on 1/18     Hypoglycemia  - Resolved   - Blood sugars down to 58 on evening of 1/17  - POC glucose x 24 hrs: 145-221; 152 this AM  - Lantus being held     Diabetic Foot Ulcers  - Wound care to manage. Does not seem infected or gangrenous.        DVT Prophylaxis: Heparin  Diet: DIET CARDIAC; Carb Control: 5 carb choices (75 gms)/meal  Diet NPO, After Midnight Exceptions are: Sips with Meds NPO tonight  Code Status: Full Code  PT/OT Eval Status: n/a     Dispo - Improving. Plan for [facility] tomorrow with cardiology if renal function stable.  Anticipate possible discharge to home tomorrow pending [facility] results     This patient was seen and evaluated with resident team and attending, Dr. Devi Aldridge of [address] [place] [address]  PGY-2  (available by [address] [place] [address])

## 2020-01-20 ENCOUNTER — PROCEDURE VISIT (OUTPATIENT)
Dept: CARDIOLOGY CLINIC | Age: 55
End: 2020-01-20
Payer: COMMERCIAL

## 2020-01-20 ENCOUNTER — APPOINTMENT (OUTPATIENT)
Dept: CARDIAC CATH/INVASIVE PROCEDURES | Age: 55
DRG: 166 | End: 2020-01-20
Payer: COMMERCIAL

## 2020-01-20 PROBLEM — R07.9 CHEST PAIN: Status: RESOLVED | Noted: 2020-01-17 | Resolved: 2020-01-20

## 2020-01-20 LAB
A/G RATIO: 1.1 (ref 1.1–2.2)
ALBUMIN SERPL-MCNC: 3.9 G/DL (ref 3.4–5)
ALP BLD-CCNC: 95 U/L (ref 40–129)
ALT SERPL-CCNC: 6 U/L (ref 10–40)
ANION GAP SERPL CALCULATED.3IONS-SCNC: 13 MMOL/L (ref 3–16)
ANION GAP SERPL CALCULATED.3IONS-SCNC: 14 MMOL/L (ref 3–16)
APTT: 59.1 SEC (ref 24.2–36.2)
AST SERPL-CCNC: 6 U/L (ref 15–37)
BASOPHILS ABSOLUTE: 0 K/UL (ref 0–0.2)
BASOPHILS RELATIVE PERCENT: 0.3 %
BILIRUB SERPL-MCNC: 0.5 MG/DL (ref 0–1)
BUN BLDV-MCNC: 49 MG/DL (ref 7–20)
BUN BLDV-MCNC: 51 MG/DL (ref 7–20)
CALCIUM SERPL-MCNC: 9.4 MG/DL (ref 8.3–10.6)
CALCIUM SERPL-MCNC: 9.6 MG/DL (ref 8.3–10.6)
CHLORIDE BLD-SCNC: 81 MMOL/L (ref 99–110)
CHLORIDE BLD-SCNC: 84 MMOL/L (ref 99–110)
CO2: 27 MMOL/L (ref 21–32)
CO2: 29 MMOL/L (ref 21–32)
CREAT SERPL-MCNC: 1.9 MG/DL (ref 0.6–1.1)
CREAT SERPL-MCNC: 2.1 MG/DL (ref 0.6–1.1)
EOSINOPHILS ABSOLUTE: 0.2 K/UL (ref 0–0.6)
EOSINOPHILS RELATIVE PERCENT: 1.8 %
GFR AFRICAN AMERICAN: 29
GFR AFRICAN AMERICAN: 33
GFR NON-AFRICAN AMERICAN: 24
GFR NON-AFRICAN AMERICAN: 27
GLOBULIN: 3.6 G/DL
GLUCOSE BLD-MCNC: 140 MG/DL (ref 70–99)
GLUCOSE BLD-MCNC: 157 MG/DL (ref 70–99)
GLUCOSE BLD-MCNC: 159 MG/DL (ref 70–99)
GLUCOSE BLD-MCNC: 182 MG/DL (ref 70–99)
GLUCOSE BLD-MCNC: 187 MG/DL (ref 70–99)
GLUCOSE BLD-MCNC: 194 MG/DL (ref 70–99)
HCT VFR BLD CALC: 30.6 % (ref 36–48)
HEMOGLOBIN: 9.9 G/DL (ref 12–16)
LYMPHOCYTES ABSOLUTE: 1.5 K/UL (ref 1–5.1)
LYMPHOCYTES RELATIVE PERCENT: 11.5 %
MCH RBC QN AUTO: 28.5 PG (ref 26–34)
MCHC RBC AUTO-ENTMCNC: 32.5 G/DL (ref 31–36)
MCV RBC AUTO: 87.7 FL (ref 80–100)
MONOCYTES ABSOLUTE: 0.7 K/UL (ref 0–1.3)
MONOCYTES RELATIVE PERCENT: 5.3 %
NEUTROPHILS ABSOLUTE: 10.5 K/UL (ref 1.7–7.7)
NEUTROPHILS RELATIVE PERCENT: 81.1 %
OSMOLALITY URINE: 258 MOSM/KG (ref 390–1070)
PDW BLD-RTO: 15.6 % (ref 12.4–15.4)
PERFORMED ON: ABNORMAL
PLATELET # BLD: 402 K/UL (ref 135–450)
PMV BLD AUTO: 8.6 FL (ref 5–10.5)
POTASSIUM REFLEX MAGNESIUM: 4.5 MMOL/L (ref 3.5–5.1)
POTASSIUM SERPL-SCNC: 4.3 MMOL/L (ref 3.5–5.1)
PRO-BNP: 2312 PG/ML (ref 0–124)
RBC # BLD: 3.48 M/UL (ref 4–5.2)
SODIUM BLD-SCNC: 123 MMOL/L (ref 136–145)
SODIUM BLD-SCNC: 125 MMOL/L (ref 136–145)
SODIUM URINE: 31 MMOL/L
TOTAL PROTEIN: 7.5 G/DL (ref 6.4–8.2)
WBC # BLD: 13 K/UL (ref 4–11)

## 2020-01-20 PROCEDURE — 4A023N7 MEASUREMENT OF CARDIAC SAMPLING AND PRESSURE, LEFT HEART, PERCUTANEOUS APPROACH: ICD-10-PCS | Performed by: INTERNAL MEDICINE

## 2020-01-20 PROCEDURE — 6370000000 HC RX 637 (ALT 250 FOR IP): Performed by: STUDENT IN AN ORGANIZED HEALTH CARE EDUCATION/TRAINING PROGRAM

## 2020-01-20 PROCEDURE — 80053 COMPREHEN METABOLIC PANEL: CPT

## 2020-01-20 PROCEDURE — 6360000004 HC RX CONTRAST MEDICATION

## 2020-01-20 PROCEDURE — 6360000002 HC RX W HCPCS: Performed by: INTERNAL MEDICINE

## 2020-01-20 PROCEDURE — 93458 L HRT ARTERY/VENTRICLE ANGIO: CPT | Performed by: INTERNAL MEDICINE

## 2020-01-20 PROCEDURE — 83880 ASSAY OF NATRIURETIC PEPTIDE: CPT

## 2020-01-20 PROCEDURE — 6360000002 HC RX W HCPCS: Performed by: FAMILY MEDICINE

## 2020-01-20 PROCEDURE — C1769 GUIDE WIRE: HCPCS

## 2020-01-20 PROCEDURE — 36415 COLL VENOUS BLD VENIPUNCTURE: CPT

## 2020-01-20 PROCEDURE — 83935 ASSAY OF URINE OSMOLALITY: CPT

## 2020-01-20 PROCEDURE — C1887 CATHETER, GUIDING: HCPCS

## 2020-01-20 PROCEDURE — 2580000003 HC RX 258: Performed by: INTERNAL MEDICINE

## 2020-01-20 PROCEDURE — 2709999900 HC NON-CHARGEABLE SUPPLY

## 2020-01-20 PROCEDURE — 1200000000 HC SEMI PRIVATE

## 2020-01-20 PROCEDURE — 93264 REM MNTR WRLS P-ART PRS SNR: CPT | Performed by: NURSE PRACTITIONER

## 2020-01-20 PROCEDURE — 6360000002 HC RX W HCPCS

## 2020-01-20 PROCEDURE — 85730 THROMBOPLASTIN TIME PARTIAL: CPT

## 2020-01-20 PROCEDURE — 93458 L HRT ARTERY/VENTRICLE ANGIO: CPT

## 2020-01-20 PROCEDURE — 85025 COMPLETE CBC W/AUTO DIFF WBC: CPT

## 2020-01-20 PROCEDURE — 94640 AIRWAY INHALATION TREATMENT: CPT

## 2020-01-20 PROCEDURE — 2500000003 HC RX 250 WO HCPCS

## 2020-01-20 PROCEDURE — B2111ZZ FLUOROSCOPY OF MULTIPLE CORONARY ARTERIES USING LOW OSMOLAR CONTRAST: ICD-10-PCS | Performed by: INTERNAL MEDICINE

## 2020-01-20 PROCEDURE — 2580000003 HC RX 258: Performed by: STUDENT IN AN ORGANIZED HEALTH CARE EDUCATION/TRAINING PROGRAM

## 2020-01-20 PROCEDURE — C1894 INTRO/SHEATH, NON-LASER: HCPCS

## 2020-01-20 PROCEDURE — B2151ZZ FLUOROSCOPY OF LEFT HEART USING LOW OSMOLAR CONTRAST: ICD-10-PCS | Performed by: INTERNAL MEDICINE

## 2020-01-20 PROCEDURE — 85347 COAGULATION TIME ACTIVATED: CPT

## 2020-01-20 PROCEDURE — 84300 ASSAY OF URINE SODIUM: CPT

## 2020-01-20 PROCEDURE — 99024 POSTOP FOLLOW-UP VISIT: CPT | Performed by: INTERNAL MEDICINE

## 2020-01-20 RX ORDER — MIDAZOLAM HYDROCHLORIDE 5 MG/ML
INJECTION INTRAMUSCULAR; INTRAVENOUS
Status: COMPLETED | OUTPATIENT
Start: 2020-01-20 | End: 2020-01-20

## 2020-01-20 RX ORDER — FENTANYL CITRATE 50 UG/ML
INJECTION, SOLUTION INTRAMUSCULAR; INTRAVENOUS
Status: COMPLETED | OUTPATIENT
Start: 2020-01-20 | End: 2020-01-20

## 2020-01-20 RX ORDER — ACETAMINOPHEN 325 MG/1
650 TABLET ORAL EVERY 4 HOURS PRN
Status: DISCONTINUED | OUTPATIENT
Start: 2020-01-20 | End: 2020-01-27

## 2020-01-20 RX ORDER — ACETYLCYSTEINE 200 MG/ML
1200 SOLUTION ORAL; RESPIRATORY (INHALATION) 2 TIMES DAILY
Status: COMPLETED | OUTPATIENT
Start: 2020-01-20 | End: 2020-01-21

## 2020-01-20 RX ORDER — SODIUM CHLORIDE 0.9 % (FLUSH) 0.9 %
10 SYRINGE (ML) INJECTION EVERY 12 HOURS SCHEDULED
Status: DISCONTINUED | OUTPATIENT
Start: 2020-01-20 | End: 2020-01-27

## 2020-01-20 RX ORDER — CHOLECALCIFEROL (VITAMIN D3) 125 MCG
5 CAPSULE ORAL PRN
Status: DISCONTINUED | OUTPATIENT
Start: 2020-01-20 | End: 2020-01-27

## 2020-01-20 RX ORDER — SODIUM CHLORIDE 0.9 % (FLUSH) 0.9 %
10 SYRINGE (ML) INJECTION PRN
Status: DISCONTINUED | OUTPATIENT
Start: 2020-01-20 | End: 2020-01-27

## 2020-01-20 RX ORDER — SODIUM CHLORIDE 9 MG/ML
INJECTION, SOLUTION INTRAVENOUS CONTINUOUS
Status: DISCONTINUED | OUTPATIENT
Start: 2020-01-20 | End: 2020-01-20

## 2020-01-20 RX ORDER — 0.9 % SODIUM CHLORIDE 0.9 %
250 INTRAVENOUS SOLUTION INTRAVENOUS ONCE
Status: COMPLETED | OUTPATIENT
Start: 2020-01-20 | End: 2020-01-20

## 2020-01-20 RX ADMIN — LAMOTRIGINE 150 MG: 100 TABLET ORAL at 20:34

## 2020-01-20 RX ADMIN — SODIUM CHLORIDE 250 ML: 9 INJECTION, SOLUTION INTRAVENOUS at 03:53

## 2020-01-20 RX ADMIN — PANTOPRAZOLE SODIUM 40 MG: 40 TABLET, DELAYED RELEASE ORAL at 09:31

## 2020-01-20 RX ADMIN — Medication 250 MG: at 09:31

## 2020-01-20 RX ADMIN — ARIPIPRAZOLE 10 MG: 10 TABLET ORAL at 09:30

## 2020-01-20 RX ADMIN — FENTANYL CITRATE 25 MCG: 50 INJECTION, SOLUTION INTRAMUSCULAR; INTRAVENOUS at 15:12

## 2020-01-20 RX ADMIN — Medication 10 ML: at 20:34

## 2020-01-20 RX ADMIN — BUPRENORPHINE AND NALOXONE 1 FILM: 8; 2 FILM, SOLUBLE BUCCAL; SUBLINGUAL at 04:21

## 2020-01-20 RX ADMIN — MIDAZOLAM HYDROCHLORIDE 1 MG: 5 INJECTION INTRAMUSCULAR; INTRAVENOUS at 15:09

## 2020-01-20 RX ADMIN — HEPARIN SODIUM AND DEXTROSE 18.8 ML/HR: 10000; 5 INJECTION INTRAVENOUS at 03:05

## 2020-01-20 RX ADMIN — MELATONIN TAB 5 MG 5 MG: 5 TAB at 03:53

## 2020-01-20 RX ADMIN — ASPIRIN 81 MG: 81 TABLET, COATED ORAL at 09:30

## 2020-01-20 RX ADMIN — FENTANYL CITRATE 25 MCG: 50 INJECTION INTRAMUSCULAR; INTRAVENOUS at 14:58

## 2020-01-20 RX ADMIN — BUPRENORPHINE AND NALOXONE 1 FILM: 8; 2 FILM, SOLUBLE BUCCAL; SUBLINGUAL at 20:34

## 2020-01-20 RX ADMIN — ATORVASTATIN CALCIUM 80 MG: 80 TABLET, FILM COATED ORAL at 20:34

## 2020-01-20 RX ADMIN — INSULIN GLARGINE 5 UNITS: 100 INJECTION, SOLUTION SUBCUTANEOUS at 22:38

## 2020-01-20 RX ADMIN — TIOTROPIUM BROMIDE 18 MCG: 18 CAPSULE ORAL; RESPIRATORY (INHALATION) at 07:51

## 2020-01-20 RX ADMIN — LAMOTRIGINE 150 MG: 100 TABLET ORAL at 11:38

## 2020-01-20 RX ADMIN — CLOPIDOGREL BISULFATE 75 MG: 75 TABLET ORAL at 09:30

## 2020-01-20 RX ADMIN — FENTANYL CITRATE 25 MCG: 50 INJECTION, SOLUTION INTRAMUSCULAR; INTRAVENOUS at 15:19

## 2020-01-20 RX ADMIN — INSULIN LISPRO 1 UNITS: 100 INJECTION, SOLUTION INTRAVENOUS; SUBCUTANEOUS at 22:38

## 2020-01-20 RX ADMIN — MAGNESIUM OXIDE TAB 400 MG (241.3 MG ELEMENTAL MG) 400 MG: 400 (241.3 MG) TAB at 09:31

## 2020-01-20 RX ADMIN — FENTANYL CITRATE 25 MCG: 50 INJECTION, SOLUTION INTRAMUSCULAR; INTRAVENOUS at 15:09

## 2020-01-20 RX ADMIN — BUSPIRONE HYDROCHLORIDE 30 MG: 5 TABLET ORAL at 09:30

## 2020-01-20 RX ADMIN — MIDAZOLAM HYDROCHLORIDE 1 MG: 5 INJECTION, SOLUTION INTRAMUSCULAR; INTRAVENOUS at 14:58

## 2020-01-20 RX ADMIN — BUSPIRONE HYDROCHLORIDE 30 MG: 5 TABLET ORAL at 20:34

## 2020-01-20 RX ADMIN — SODIUM CHLORIDE 25 ML/HR: 9 INJECTION, SOLUTION INTRAVENOUS at 13:15

## 2020-01-20 RX ADMIN — BENZTROPINE MESYLATE 1 MG: 1 TABLET ORAL at 09:30

## 2020-01-20 RX ADMIN — Medication 250 MG: at 20:34

## 2020-01-20 RX ADMIN — ACETYLCYSTEINE 1200 MG: 200 SOLUTION ORAL; RESPIRATORY (INHALATION) at 20:33

## 2020-01-20 RX ADMIN — ACETAMINOPHEN 650 MG: 325 TABLET ORAL at 18:25

## 2020-01-20 ASSESSMENT — PAIN SCALES - GENERAL
PAINLEVEL_OUTOF10: 0
PAINLEVEL_OUTOF10: 4

## 2020-01-20 ASSESSMENT — PAIN DESCRIPTION - LOCATION: LOCATION: HEAD

## 2020-01-20 ASSESSMENT — PAIN DESCRIPTION - PAIN TYPE: TYPE: ACUTE PAIN

## 2020-01-20 NOTE — CONSULTS
[address] [address] Continence Nurse  Consult Note       NAME:  Gavin Bautista  MEDICAL RECORD NUMBER:  [id number]  AGE: 62 y.o. GENDER: female  : 1965  TODAY'S DATE:  2020    Subjective  I saw Dr Sherry Dow, podiatrist in [facility] on 20 and she gave me a cream that began with an \"S\" to put on the wounds. I cannot remember the names of it. Reason for WOCN Evaluation and Assessment: diabetic foot ulcers      Abel Ramos is a 62 y.o. female referred by:   [x] Physician  [x] Nursing  [] Other:     Wound Identification:  Wound Type: Diabetic ulcers right plantar foot, left 1st inner dorsal toe and plantar 3rd toe. Left 2nd toe with stage 2 pressure injury from wearing work boots. Contributing Factors: edema, diabetes, chronic pressure, decreased mobility, shear force and CKI. Hx coronary artery disease. Wound History: Known diabetic with wounds on her feet for over 6 month.   Patient with elevated Hgb A1C >7   Current Wound Care Treatment:  Open to air at this time    Patient Goal of Care:  [x] Wound Healing  [] Odor Control  [] Palliative Care  [] Pain Control   [] Other:         PAST MEDICAL HISTORY        Diagnosis Date    Anxiety and depression     CAD (coronary artery disease) 2018    Cardiomyopathy [facility]     CH[facility]congestive heart failure) ([facility]     COPD (chronic obstructive pulmonary disease) ([facility]     Diabetes mellitus ([facility]     GERD (gastroesophageal reflux disease)     Hyperlipidemia     Hypertension     Hypotension     MI (myocardial infarction) ([facility]     Peripheral neuropathy     [facility]     Pulmonary HTN ([facility]     Reflux     Sleep apnea     has never done sleep study;does not use CPAP    Wears glasses     for reading       PAST SURGICAL HISTORY    Past Surgical History:   Procedure Laterality Date    ARTERIAL BYPASS SURGRY Left 2016    Axillary/Subclavian to Brachial Bypass w/reversed SVG    CARDIAC CATHETERIZATION 2018    Dr. Jenniffer Azevedo - at [address]      pancreatitis post surgery    CORONARY ANGIOPLASTY WITH STENT PLACEMENT  2018    MELODY- 3.5 x 38 and 3.5 x 20 to Cx    CORONARY ANGIOPLASTY WITH STENT PLACEMENT  2018    MELODY- 3.0 x 38 and 2.75 x 26 and 2.75 x 8 and 2.75 x 22 to the LAD    FEMORAL BYPASS Right 2019    RIGHT FEMORAL POPLITEAL ARTERY BYPASS GRAFT performed by Abbie Villanueva MD at [address]  2019    CardioMEMs insertion for CHF    OTHER SURGICAL HISTORY  10/08/2019    Bilateral LE angio with PTA occluded L SFA and restenting L Prox SFA    ROTATOR CUFF REPAIR Left 2016    TONSILLECTOMY      TUMOR EXCISION      benign tumors X 2 chest & axilla    UPPER GASTROINTESTINAL ENDOSCOPY  2013    BX barretts, HH, gastritis    UPPER GASTROINTESTINAL ENDOSCOPY  12/10/2015    UPPER GASTROINTESTINAL ENDOSCOPY  2017    Gastritis       FAMILY HISTORY    Family History   Problem Relation Age of Onset    Heart Disease Maternal Grandmother     Cancer Mother         lung and brain cancer    Cancer Maternal Aunt         lung and brain cancer       SOCIAL HISTORY    Social History     Tobacco Use    Smoking status: Former Smoker     Packs/day: 0.25     Years: 30.00     Pack years: 7.50     Types: Cigarettes     Last attempt to quit: 2019     Years since quittin.4    Smokeless tobacco: Never Used   Substance Use Topics    Alcohol use: No    Drug use: Never       ALLERGIES    Allergies   Allergen Reactions    Lisinopril Other (See Comments)     Severe hypotension       MEDICATIONS    No current facility-administered medications on file prior to encounter.       Current Outpatient Medications on File Prior to Encounter   Medication Sig Dispense Refill    torsemide (DEMADEX) 100 MG tablet Take 1 tablet by mouth 2 times daily 60 tablet 3    spironolactone (ALDACTONE) 100 MG tablet Take 1 tablet by aspirin EC 81 MG EC tablet Take 1 tablet by mouth daily 30 tablet 3    ARIPiprazole (ABILIFY) 10 MG tablet Take 10 mg by mouth daily       lamoTRIgine (LAMICTAL) 150 MG tablet Take 150 mg by mouth 2 times daily       Varenicline Tartrate (CHANTIX PO) Take by mouth         Objective  Lying in bed. /66   Pulse 89   Temp 97.6 °F (36.4 °C) (Oral)   Resp 18   Ht 5' 4.5\" (1.638 m)   Wt 241 lb 11.2 oz (109.6 kg)   LMP 10/24/2012   SpO2 95%   BMI 40.85 kg/m²     LABS:  WBC:    Lab Results   Component Value Date    WBC 13.0 01/20/2020     H/H:    Lab Results   Component Value Date    HGB 9.9 01/20/2020    HCT 30.6 01/20/2020     PTT:    Lab Results   Component Value Date    APTT 59.1 01/20/2020   [APTT}  PT/INR:    Lab Results   Component Value Date    PROTIME 12.4 10/08/2019    INR 1.09 10/08/2019     HgBA1c:    Lab Results   Component Value Date    LABA1C 7.3 12/03/2019       Assessment  Feet wounds brown, closed, dry at this time on left 1st & 3rd toes and right lateral foot. . raised bulla 2nd toe. See photos.    Vivek Risk Score: Vivek Scale Score: 18    Patient Active Problem List   Diagnosis    Severe [facility]ication [facility]    Diabetes mellitus type 2 in obese ([facility]    HTN (hypertension)    CLINT (obstructive sleep apnea)    Tobacco smoker    PVD (peripheral vascular disease) with claudication (HCC)    Hypotension    Volume depletion    GERD (gastroesophageal reflux disease)    Mixed hyperlipidemia    Anxiety and depression    Presyncope    Hypochloremia    Normocytic anemia    Hypomagnesemia    Acute hyperglycemia    CAD (coronary artery disease)    PVD (peripheral vascular disease) (HCC)    Acute systolic congestive heart failure (HCC)    Mitral regurgitation    Myocardiopathy (HCC)    Palpitations    Chronic systolic congestive heart failure (HCC)    CKD (chronic kidney disease) stage 3, GFR 30-59 ml/min (HCC)    Hypokalemia    Acute renal failure with tubular necrosis Undermining Starts ___ O'Clock 0 1/20/2020  9:53 AM   Undermining Ends___ O'Clock 0 1/20/2020  9:53 AM   Undermining Maxium Distance (cm) 0 1/20/2020  9:53 AM   Wound Assessment Other (Comment) 1/20/2020  9:53 AM   Drainage Amount None 1/20/2020  9:53 AM   Odor None 1/20/2020  9:53 AM   Margins Unattached edges; Defined edges 1/20/2020  9:53 AM   Shaye-wound Assessment Clean;Dry; Intact 1/20/2020  9:53 AM   Marcellus%Wound Bed pink 100%, clear filled bulla 1/20/2020  9:53 AM   Culture Taken No 1/20/2020  9:53 AM   Number of days: 0       Wound 01/20/20 Foot Right;Plantar;Lateral brown, dry, closed (Active)   Wound Image   1/20/2020  9:53 AM   Wound Diabetic 1/20/2020  9:53 AM   Offloading for Diabetic Foot Ulcers Diabetic shoes/inserts 1/20/2020  9:53 AM   Wound Cleansed Not Cleansed 1/20/2020  9:53 AM   Dressing Change Due 01/20/20 1/20/2020  9:53 AM   Wound Length (cm) 0.4 cm 1/20/2020  9:53 AM   Wound Width (cm) 0.5 cm 1/20/2020  9:53 AM   Wound Depth (cm) 0 cm 1/20/2020  9:53 AM   Wound Surface Area (cm^2) 0.2 cm^2 1/20/2020  9:53 AM   Wound Volume (cm^3) 0 cm^3 1/20/2020  9:53 AM   Distance Tunneling (cm) 0 cm 1/20/2020  9:53 AM   Tunneling Position ___ O'Clock 0 1/20/2020  9:53 AM   Undermining Starts ___ O'Clock 0 1/20/2020  9:53 AM   Undermining Ends___ O'Clock 0 1/20/2020  9:53 AM   Undermining Maxium Distance (cm) 0 1/20/2020  9:53 AM   Wound Assessment Brown;Dry 1/20/2020  9:53 AM   Drainage Amount None 1/20/2020  9:53 AM   Odor None 1/20/2020  9:53 AM   Margins Attached edges; Defined edges 1/20/2020  9:53 AM   Shaye-wound Assessment Calloused 1/20/2020  9:53 AM   Non-staged Wound Description Partial thickness 1/20/2020  9:53 AM   Other%Wound Bed brown 100% 1/20/2020  9:53 AM   Culture Taken No 1/20/2020  9:53 AM   Number of days: 0     Right plantar lateral foot:            Response to treatment:  No treatment done at this time    Pain Assessment:  Severity:  0 / 10  Quality of pain: N/A  Wound Pain learning  [] Refused teaching         [] N/A       Electronically signed by Lieutenant Alvarenga, RN, MSN, [facility] [place] on 1/20/2020 at 10:10 AM

## 2020-01-20 NOTE — PLAN OF CARE
Problem: Falls - Risk of:  Goal: Will remain free from falls  Description  Will remain free from falls  Outcome: Ongoing  Goal: Absence of physical injury  Description  Absence of physical injury  Outcome: Ongoing     Problem: Infection:  Goal: Will remain free from infection  Description  Will remain free from infection  Outcome: Ongoing     Problem: Safety:  Goal: Free from accidental physical injury  Description  Free from accidental physical injury  Outcome: Ongoing  Goal: Free from intentional harm  Description  Free from intentional harm  Outcome: Ongoing     Problem: Daily Care:  Goal: Daily care needs are met  Description  Daily care needs are met  Outcome: Ongoing     Problem: Pain:  Goal: Patient's pain/discomfort is manageable  Description  Patient's pain/discomfort is manageable  Outcome: Ongoing  Goal: Pain level will decrease  Description  Pain level will decrease  Outcome: Ongoing  Goal: Control of acute pain  Description  Control of acute pain  Outcome: Ongoing  Goal: Control of chronic pain  Description  Control of chronic pain  Outcome: Ongoing     Problem: Skin Integrity:  Goal: Skin integrity will stabilize  Description  Skin integrity will stabilize  Outcome: Ongoing     Problem: Discharge Planning:  Goal: Patients continuum of care needs are met  Description  Patients continuum of care needs are met  Outcome: Ongoing       Alert and oriented times 4. Pleasant and cooperative. Denies pain thus far this shift. SR on the monitor. Planned cardiac angiogram today. NPO status maintained other than meds.

## 2020-01-20 NOTE — PROGRESS NOTES
75 mg, Daily  pantoprazole (PROTONIX) tablet 40 mg, BID  vitamin D (ERGOCALCIFEROL) capsule 50,000 Units, Weekly  [Held by provider] carvedilol (COREG) tablet 3.125 mg, BID  benztropine (COGENTIN) tablet 1 mg, Daily  saccharomyces boulardii (FLORASTOR) capsule 250 mg, BID  [Held by provider] torsemide (DEMADEX) tablet 100 mg, BID  [Held by provider] spironolactone (ALDACTONE) tablet 100 mg, BID  [Held by provider] metOLazone (ZAROXOLYN) tablet 2.5 mg, Daily  nitroGLYCERIN (NITROSTAT) SL tablet 0.4 mg, Q5 Min PRN  sodium chloride flush 0.9 % injection 10 mL, 2 times per day  sodium chloride flush 0.9 % injection 10 mL, PRN  magnesium hydroxide (MILK OF MAGNESIA) 400 MG/5ML suspension 30 mL, Daily PRN  ondansetron (ZOFRAN) injection 4 mg, Q6H PRN  heparin (porcine) injection 4,000 Units, PRN  heparin (porcine) injection 2,000 Units, PRN  tiotropium (SPIRIVA) inhalation capsule 18 mcg, Daily  glucose (GLUTOSE) 40 % oral gel 15 g, PRN  dextrose 50 % IV solution, PRN  glucagon (rDNA) injection 1 mg, PRN  dextrose 5 % solution, PRN  insulin lispro (HUMALOG) injection vial 0-12 Units, TID WC  insulin lispro (HUMALOG) injection vial 0-6 Units, Nightly  albuterol sulfate  (90 Base) MCG/ACT inhaler 2 puff, Q4H PRN  acetaminophen (TYLENOL) tablet 650 mg, Q4H PRN  nicotine (NICODERM CQ) 14 MG/24HR 1 patch, Daily        I/O:     Intake/Output Summary (Last 24 hours) at 1/20/2020 1309  Last data filed at 1/20/2020 1235  Gross per 24 hour   Intake --   Output 3275 ml   Net -3275 ml       Physical Exam:    /66   Pulse 87   Temp 98.6 °F (37 °C) (Oral)   Resp 16   Ht 5' 4.5\" (1.638 m)   Wt 241 lb 11.2 oz (109.6 kg)   LMP 10/24/2012   SpO2 92%   BMI 40.85 kg/m²   Wt Readings from Last 3 Encounters:   01/20/20 241 lb 11.2 oz (109.6 kg)   01/17/20 245 lb (111.1 kg)   01/16/20 251 lb (113.9 kg)       GENERAL: Well developed, well nourished, no acute distress  NEUROLOGICAL: Alert and oriented x3  PSYCH: Normal mood

## 2020-01-20 NOTE — OP NOTE
Cardiac Catheterization     Procedure: [facility]    Complications: None  Medications: Procedural sedation with minimal conscious sedation  Estimated Blood Loss: Less than 20 mL  Specimens: were not obtained  Access:  5 Fr R Radial   Closure: TR Band   Contrast:  67 cc  Findings:     Left Heart Cath  Dominance : Right    LM: 70-80% short distal stenosis    LAD: 70-80% short ostial stenosis, extending into takeoff of high first diagonal; large proximal to mid stented segment patent with jailed second diagonal  (80% ostial D2) and 70% in stent restenosis of most distal stent; distal to near apical LAD with minimal disease    LCx: 70-80% short ostial stenosis, prior to patent proximal to mid stents     RCA: large, dominant vessel with long 60% proximal to mid stenosis     LVEDP: 4 mmHG  LVG not done due to CKD. Impression/Recommendations:  Diabetic with previous LAD and LCx stenting in 2018 returns with unstable angina, in stent restenosis and Left Main bifurcation disease. Recommend CTS evaluation inpatient to discuss surgical revascularization option. Hold Clopidogrel.       Aminah Vogt D.O., [facility]  Interventional Cardiology     o: [phone]  [address] [place] [address]., [address], [address] [facility]

## 2020-01-20 NOTE — PROGRESS NOTES
times per day    tiotropium  18 mcg Inhalation Daily    insulin lispro  0-12 Units Subcutaneous TID     insulin lispro  0-6 Units Subcutaneous Nightly    nicotine  1 patch Transdermal Daily     PRN Meds: melatonin, sodium chloride flush, acetaminophen, nitroGLYCERIN, sodium chloride flush, magnesium hydroxide, ondansetron, glucose, dextrose, glucagon (rDNA), dextrose, albuterol sulfate HFA, acetaminophen      Intake/Output Summary (Last 24 hours) at 1/20/2020 1823  Last data filed at 1/20/2020 1235  Gross per 24 hour   Intake --   Output 2375 ml   Net -2375 ml       Physical Exam Performed:    /75   Pulse 85   Temp 98.4 °F (36.9 °C) (Oral)   Resp 16   Ht 5' 4.5\" (1.638 m)   Wt 241 lb 11.2 oz (109.6 kg)   LMP 10/24/2012   SpO2 98%   BMI 40.85 kg/m²     Physical Exam  Vitals signs reviewed. Constitutional:       General: She is not in acute distress. Appearance: Normal appearance. She is obese. She is not ill-appearing. HENT:      Head: Normocephalic and atraumatic. Eyes:      Conjunctiva/sclera: Conjunctivae normal.   Cardiovascular:      Rate and Rhythm: Normal rate and regular rhythm. Heart sounds: No murmur. No friction rub. No gallop. Pulmonary:      Effort: Pulmonary effort is normal.      Breath sounds: No wheezing, rhonchi or rales. Abdominal:      General: Abdomen is flat. Bowel sounds are normal.      Palpations: Abdomen is soft. Tenderness: There is no guarding or rebound. Musculoskeletal:         General: Tenderness (lower extremeties, mild) present. Right lower leg: Edema (2+ pitting, improved from admission) present. Left lower leg: Edema (2+ pitting, greater than right but still improved from admission) present. Skin:     General: Skin is warm and dry. Findings: Erythema ( lower extremities consistent with venous stasis) present. Comments: Small but clean ulcers noted on patient's bilateral toes.    Neurological:      General: No focal

## 2020-01-20 NOTE — CONSULTS
[address] [address]                                  CONSULTATION    PATIENT NAME: Janeth Fuentes                    :        1965  MED REC NO:   [id number]                          ROOM:       1808  ACCOUNT NO:   [de-identified]                           ADMIT DATE: 2020  PROVIDER:     Adele Garcia MD    CONSULT DATE:  2020    REASON FOR CONSULTATION:  Acute-on-chronic kidney disease. HISTORY OF PRESENT ILLNESS:  The patient is a 25-year-old   female patient with a past medical history significant for chronic  kidney disease and the baseline creatinine ranging between 1.5 and 1.7  mg/dL. The patient presented to [facility] complaining of  chest pain and was noted to have an acute-on-chronic kidney injury with  a serum creatinine of 2.2 mg/dL which prompted Nephrology consultation. The patient was admitted for further cardiac evaluation and Nephrology  was consulted for further management of her worsening renal function. PAST MEDICAL HISTORY:  1. Coronary artery disease. 2.  Chronic kidney disease. 3.  Congestive heart failure. 4.  Chronic obstructive pulmonary disease. 5.  Diabetes mellitus. 6.  Hypertension. 7.  Hyperlipidemia. 8.  Peripheral vascular disease. 9.  Sleep apnea. 10.  Gastroesophageal reflux disease. PAST SURGICAL HISTORY:  1. Coronary artery angiogram.  2.  Cholecystectomy. 3.  Femoral bypass. 4.  Tonsillectomy. 5.  Rotator cuff repair. ALLERGIES:  The patient is allergic to LISINOPRIL. SOCIAL HISTORY:  The patient quit smoking several months ago and does  not drink alcohol. FAMILY HISTORY:  Significant for coronary artery disease in her mother. REVIEW OF SYSTEMS:  The patient denied any fever, chills, cough, or  expectorations. Otherwise, a 10-point review of systems was relatively  unremarkable.     PHYSICAL EXAMINATION:  VITAL SIGNS:  Blood pressure 107/66, heart rate 87, respirations 16,  temperature 98.6 Fahrenheit. The patient is saturating 92% on room air. GENERAL APPEARANCE:  The patient is alert and oriented x3, not in acute  distress. HEENT:  Eyes revealed normal conjunctivae, reactive pupils. NECK:  Revealed midline trachea, nonpalpable thyroid. LUNGS:  Clear to anterior auscultation bilaterally, nonlabored  breathing. CARDIOVASCULAR EXAM:  Revealed S1 and S2, regular rate and rhythm. No  murmurs or rubs. 1+ lower extremities pitting edema. ABDOMEN:  Exam revealed an obese abdomen, soft. No organomegaly. SKIN:  Revealed no lesions or rashes. Warm to touch. PSYCHIATRIC:  Revealed good judgment and insight. LYMPHATICS:  Revealed no cervical or axillary adenopathies. ASSESSMENT AND PLAN:  1. Acute-on-chronic kidney disease. The patient has chronic kidney  disease with a baseline creatinine of 1.5 to 1.7 mg/dL. Her acute  kidney injury is likely secondary to a prerenal component, although a  nonoliguric ATN injury in the setting of hypotension cannot be ruled  out. 2.  Hyponatremia, improving with volume expansion. 3.  Hypotension. Hold all antihypertensive medications. 4.  Anemia, stable hemoglobin and monitor. 5.  Chest pain, management per Cardiology. RECOMMENDATIONS:  1. Discontinue all diuretics. 2.  Continue isotonic volume expansion. 3.  Maintain systolic blood pressure above 100 mmHg. 4.  Administer Mucomyst post-contrast exposure.         Hieu Faustin MD    D: 01/20/2020 16:18:05       T: 01/20/2020 17:44:22     [email or web address]  Job#: [id number]     Doc#: [id number]    CC:

## 2020-01-20 NOTE — PRE SEDATION
Brief Pre-Op Note/Sedation Assessment      Elvis Bautista  1965  [id number]/[id number]  [id number]  1:12 PM    Planned Procedure: Cardiac Catheterization     Post Procedure Plan: Return to same level of care    Consent: I have discussed with the patient and/or the patient representative the indication, alternatives, and the possible risks and/or complications of the planned procedure and the anesthesia methods. The patient and/or patient representative appear to understand and agree to proceed. Chief Complaint: Anginal Equivalent      Indications for the Procedure:   CAD Presentation:  Worsening Angina    CCS Anginal Classification within 2 weeks:  CCS III - Symptoms with everyday living activities, i.e., moderate limitation    NYHA Heart Failure Class within 2 weeks:  Systolic Class II-III    Valvular Disease:  Mild MR    Functional Capacity:   4 METS       Anti- Anginal Meds within 2 weeks:   ANTI-ANGINAL MEDS: Yes: Beta Blockers, Aspirin, Non-Statin (Any) and Statin (Any)    Stress or Imaging Studies Performed:  None    Vital Signs:  /66   Pulse 87   Temp 98.6 °F (37 °C) (Oral)   Resp 16   Ht 5' 4.5\" (1.638 m)   Wt 241 lb 11.2 oz (109.6 kg)   LMP 10/24/2012   SpO2 92%   BMI 40.85 kg/m²     Allergies:   Allergies   Allergen Reactions    Lisinopril Other (See Comments)     Severe hypotension       Past Medical History:  Past Medical History:   Diagnosis Date    Anxiety and depression     CAD (coronary artery disease) 01/2018    Cardiomy[facility]     [facility]     COPD (chronic obstructive pulmonary disease) [facility]     Diabetes mellitus ([address]     GERD (gastroesophageal reflux disease)     Hyperlipidemia     Hypertension     Hypotension     MI (myocardial infarction) ([address]     Peripheral neuropathy     Peripheral vascular disease ([facility]     Pulmonary HTN ([facility]     Reflux     Sleep apnea     has never done sleep study;does not use CPAP    Wears extremities voluntarily on command  Respiration:  2 - Able to breathe deeply and cough freely  Circulation:  2 - BP+/- 20mmHg of normal  Consciousness:  2 - Fully awake  Oxygen Saturation (color):  2 - Able to maintain oxygen saturation >92% on room air    Sedation/Anesthesia Plan:  Guard the patient's safety and welfare. Minimize physical discomfort and pain. Minimize negative psychological responses to treatment by providing sedation and analgesia and maximize the potential amnesia. Patient to meet pre-procedure discharge plan. Medication Planned:  midazolam intravenously, fentanyl intravenously    Patient is an appropriate candidate for plan of sedation: yes    Preoperative Risk: Intermediate    The risks, benefits, goals, and alternatives of the procedure were discussed in detail with the patient. Informed consent was obtained and further recommendations will be made following the procedure.      Sabine Calle D.O., [facility]  Interventional Cardiology     o: [phone]  [facility] [employer] [facility] [address], [address] [facility]

## 2020-01-20 NOTE — ADT AUTH CERT
Utilization Reviews         PA recommendation by Kanwal Avina RN         Review Status Review Entered   In Primary 1/20/2020 10:54       Criteria Review   slr obs to in    [facility] Physician Advisor Recommendation    The information in this document is a recommendation to be used for utilization  review and utilization management purposes only. This recommendation is not an  order. The recommendation is made based on the information reviewed at the time  of the referral, is pursuant to the [facility] Conditions of  Participation (42 CFR Part 482), and is neither a judgment nor an assessment  with regard to the appropriateness or quality of clinical care. Nothing in this  document may be used to limit, alter, or affect clinical services provided to  the patient named below. The provider of services is ultimately responsible for  the submission of a claim that has met all requirements for correct coding,  billing, and reimbursement. At high risk for adverse events and deterioration per documentation    \"Plan for left heart catheterization +/- PCI Monday 1/20/20 pending renal  function and clinical course. No immediate indication for heart cath and would  like to see continued improvement in renal function unless angina becomes  unstable. \"     Chart reviewed and VUR notified.        Angina - Care Day 2 (1/19/2020) by Kanwal Avina RN         Review Status Review Entered   Completed 1/20/2020 10:53       Criteria Review      Care Day: 2 Care Date: 1/19/2020 Level of Care:    Guideline Day 2    Level Of Care    ( ) Intermediate care or telemetry to discharge    1/20/2020 10:53 AM EST by Ruben Zapata remains on med surg unit    Clinical Status    (X) * Hemodynamic stability    (X) * Dangerous arrhythmia absent    (X) * Chest pain, dyspnea, or anginal equivalent absent    ( ) * No evidence of bleeding or myocardial ischemia    ( ) * Vascular access site without evidence of infection, warm and dry.       Findings: Erythema ( lower extremities consistent with venous stasis) present.       Comments: Small but clean ulcers noted on patient's bilateral toes. Assessment/Plan:       Active Hospital Problems     Diagnosis Date Noted   · Diabetic ulcer of toe of right foot associated with type 2 diabetes mellitus ([facility] [L74.053, L97.519] 01/18/2020   · Diabetic ulcer of left foot associated with type 2 diabetes mellitus ([facility] [U26.499, L97.529] 01/18/2020   · Hypoglycemia associated with type 2 diabetes mellitus ([facility] [E11.649] 01/18/2020   · Chest pain [R07.9] 01/17/2020   · Peripheral edema [R60.9] 01/17/2020   · Hypokalemia [E87.6] 04/08/2019   · CKD (chronic kidney disease) stage 3, GFR 30-59 ml/min (HCC) [N18.3] 01/09/2019       Chest pain   - Resolved   - Troponins negative on admission    - Cardiology to do left heart cath on Monday. Per cardiology, holding Entresto and Coreg (secondary to hypotension and renal dysfunction)       Peripheral edema   - Improved   - Per cardiology, holding Metolazone and Torsemide today (1/18)M    - Continue Spironolactone 100mg BID       Chronic kidney disease, stage 3, recent SHAUNNA   - Was recently admitted for SHAUNNA with last creatine on discharge 3.2 (1/12/20)   - Creatinine 1.8 --> 1.9 --> 2.0 today    - Avoid nephrotoxin        Hyperkalemia   - Resolved   - K 3.4 -- >3.8 today   - s/p 20 mEq Klor-Con x2 on 1/18       Hypoglycemia   - Resolved    - Blood sugars down to 58 on evening of 1/17   - POC glucose x 24 hrs: 145-221; 152 this AM   - Lantus being held       Diabetic Foot Ulcers   - Wound care to manage.  Does not seem infected or gangrenous.       DVT Prophylaxis: Heparin   Diet: DIET CARDIAC; Carb Control: 5 carb choices (75 gms)/meal   Diet NPO, After Midnight Exceptions are: Sips with Meds NPO tonight   Code Status: Full Code   PT/OT Eval Status: n/a       Dispo - Improving. Plan for [facility] tomorrow with cardiology if renal function stable.  Anticipate possible discharge to home tomorrow pending LHC results      Comprehensive Metabolic Panel w/ Reflex to MG [[id number]] (Abnormal)    Collected: 01/19/20 0640    Updated: 01/19/20 0717    Specimen Source: Blood    Sodium 126Low mmol/L   Potassium reflex Magnesium 3.8 mmol/L   Chloride 85Low mmol/L   CO2 29 mmol/L   Anion Gap 12   Glucose 159High mg/dL   BUN 53High mg/dL   CREATININE 2.0High mg/dL   GFR Non- 26Abnormal    GFR  31Abnormal    Calcium 9.2 mg/dL   Total Protein 7.2 g/dL   Alb 3.4 g/dL   Albumin/Globulin Ratio 0.9Low    Total Bilirubin 0.4 mg/dL   Alkaline Phosphatase 96 U/L   ALT 7Low U/L   AST 9Low U/L   Globulin 3.8       Cardio note-   Subjective:   Ms. Kyler Amos lying in bed, reports increased urination here, abdomen and legs less tight/ edematous.  No shortness of breath.  No further chest pain.     Objective:    BP (!) 93/58   Pulse 89   Temp 97.7 °F (36.5 °C) (Oral)   Resp 20   Ht 5' 4.5\" (1.638 m)   Wt 237 lb 9.6 oz (107.8 kg)   LMP 10/24/2012   SpO2 97%     ASSESSMENT:    1. CAD - chest pain typical of angina, ECG and troponin negative for ischemia, started on IV Heparin, plan for LHC if renal function stable   2. ICM - EF 40-45%, on low dose BB but limited due to hypotension   3. CKD, acute on chronic - improved from last hospitalization but worse over past 24 hrs, good UOP, weight down   4. Hypotension - asymptomatic, hold Coreg   5. Hyponatremia - with other electrolyte abnormalities   PLAN:   1. Hold metolazone and torsemide today   2. Continue the spironolactone 100 mg bid   3. Hold Coreg given hypotension and renal dysfunction   4. LHC tomorrow if renal function improved/ stable Jatin Holm, afternoon)   5. Consider nephrology consult if renal function continues to deteriorate   6. Continue IV heparin for now   7.  Asked patient to bring in her CardioMEMs pillow

## 2020-01-21 LAB
A/G RATIO: 1.1 (ref 1.1–2.2)
ALBUMIN SERPL-MCNC: 3.7 G/DL (ref 3.4–5)
ALP BLD-CCNC: 86 U/L (ref 40–129)
ALT SERPL-CCNC: 6 U/L (ref 10–40)
ANION GAP SERPL CALCULATED.3IONS-SCNC: 12 MMOL/L (ref 3–16)
AST SERPL-CCNC: 8 U/L (ref 15–37)
BASOPHILS ABSOLUTE: 0 K/UL (ref 0–0.2)
BASOPHILS RELATIVE PERCENT: 0.3 %
BILIRUB SERPL-MCNC: 0.4 MG/DL (ref 0–1)
BUN BLDV-MCNC: 44 MG/DL (ref 7–20)
CALCIUM SERPL-MCNC: 9.5 MG/DL (ref 8.3–10.6)
CHLORIDE BLD-SCNC: 86 MMOL/L (ref 99–110)
CO2: 27 MMOL/L (ref 21–32)
CREAT SERPL-MCNC: 1.8 MG/DL (ref 0.6–1.1)
EOSINOPHILS ABSOLUTE: 0.2 K/UL (ref 0–0.6)
EOSINOPHILS RELATIVE PERCENT: 1.7 %
GFR AFRICAN AMERICAN: 35
GFR NON-AFRICAN AMERICAN: 29
GLOBULIN: 3.3 G/DL
GLUCOSE BLD-MCNC: 142 MG/DL (ref 70–99)
GLUCOSE BLD-MCNC: 152 MG/DL (ref 70–99)
GLUCOSE BLD-MCNC: 172 MG/DL (ref 70–99)
GLUCOSE BLD-MCNC: 203 MG/DL (ref 70–99)
GLUCOSE BLD-MCNC: 203 MG/DL (ref 70–99)
HCT VFR BLD CALC: 29.6 % (ref 36–48)
HEMOGLOBIN: 9.7 G/DL (ref 12–16)
LYMPHOCYTES ABSOLUTE: 1 K/UL (ref 1–5.1)
LYMPHOCYTES RELATIVE PERCENT: 10 %
MCH RBC QN AUTO: 29 PG (ref 26–34)
MCHC RBC AUTO-ENTMCNC: 32.8 G/DL (ref 31–36)
MCV RBC AUTO: 88.5 FL (ref 80–100)
MONOCYTES ABSOLUTE: 0.5 K/UL (ref 0–1.3)
MONOCYTES RELATIVE PERCENT: 5.1 %
NEUTROPHILS ABSOLUTE: 8.5 K/UL (ref 1.7–7.7)
NEUTROPHILS RELATIVE PERCENT: 82.9 %
PDW BLD-RTO: 15.7 % (ref 12.4–15.4)
PERFORMED ON: ABNORMAL
PLATELET # BLD: 347 K/UL (ref 135–450)
PMV BLD AUTO: 8.5 FL (ref 5–10.5)
POC ACT LR: 134 SEC
POTASSIUM REFLEX MAGNESIUM: 4.3 MMOL/L (ref 3.5–5.1)
POTASSIUM SERPL-SCNC: 4.3 MMOL/L (ref 3.5–5.1)
RBC # BLD: 3.34 M/UL (ref 4–5.2)
SODIUM BLD-SCNC: 125 MMOL/L (ref 136–145)
TOTAL PROTEIN: 7 G/DL (ref 6.4–8.2)
WBC # BLD: 10.3 K/UL (ref 4–11)

## 2020-01-21 PROCEDURE — 6360000002 HC RX W HCPCS: Performed by: INTERNAL MEDICINE

## 2020-01-21 PROCEDURE — 36415 COLL VENOUS BLD VENIPUNCTURE: CPT

## 2020-01-21 PROCEDURE — 85025 COMPLETE CBC W/AUTO DIFF WBC: CPT

## 2020-01-21 PROCEDURE — 80053 COMPREHEN METABOLIC PANEL: CPT

## 2020-01-21 PROCEDURE — 6370000000 HC RX 637 (ALT 250 FOR IP): Performed by: STUDENT IN AN ORGANIZED HEALTH CARE EDUCATION/TRAINING PROGRAM

## 2020-01-21 PROCEDURE — 2500000003 HC RX 250 WO HCPCS: Performed by: STUDENT IN AN ORGANIZED HEALTH CARE EDUCATION/TRAINING PROGRAM

## 2020-01-21 PROCEDURE — 1200000000 HC SEMI PRIVATE

## 2020-01-21 PROCEDURE — 6370000000 HC RX 637 (ALT 250 FOR IP): Performed by: INTERNAL MEDICINE

## 2020-01-21 PROCEDURE — 2580000003 HC RX 258: Performed by: INTERNAL MEDICINE

## 2020-01-21 PROCEDURE — 99233 SBSQ HOSP IP/OBS HIGH 50: CPT | Performed by: NURSE PRACTITIONER

## 2020-01-21 RX ORDER — INSULIN GLARGINE 100 [IU]/ML
10 INJECTION, SOLUTION SUBCUTANEOUS NIGHTLY
Status: DISCONTINUED | OUTPATIENT
Start: 2020-01-21 | End: 2020-01-22

## 2020-01-21 RX ADMIN — PANTOPRAZOLE SODIUM 40 MG: 40 TABLET, DELAYED RELEASE ORAL at 09:08

## 2020-01-21 RX ADMIN — ARIPIPRAZOLE 10 MG: 10 TABLET ORAL at 09:06

## 2020-01-21 RX ADMIN — ATORVASTATIN CALCIUM 80 MG: 80 TABLET, FILM COATED ORAL at 21:17

## 2020-01-21 RX ADMIN — ASPIRIN 81 MG: 81 TABLET, COATED ORAL at 09:07

## 2020-01-21 RX ADMIN — BENZTROPINE MESYLATE 1 MG: 1 TABLET ORAL at 09:07

## 2020-01-21 RX ADMIN — BUSPIRONE HYDROCHLORIDE 30 MG: 5 TABLET ORAL at 09:07

## 2020-01-21 RX ADMIN — ACETAMINOPHEN 650 MG: 325 TABLET ORAL at 21:18

## 2020-01-21 RX ADMIN — INSULIN LISPRO 4 UNITS: 100 INJECTION, SOLUTION INTRAVENOUS; SUBCUTANEOUS at 09:17

## 2020-01-21 RX ADMIN — LAMOTRIGINE 150 MG: 100 TABLET ORAL at 09:07

## 2020-01-21 RX ADMIN — ACETAMINOPHEN 650 MG: 325 TABLET ORAL at 02:30

## 2020-01-21 RX ADMIN — BUPRENORPHINE AND NALOXONE 1 FILM: 8; 2 FILM, SOLUBLE BUCCAL; SUBLINGUAL at 09:07

## 2020-01-21 RX ADMIN — BUPRENORPHINE AND NALOXONE 1 FILM: 8; 2 FILM, SOLUBLE BUCCAL; SUBLINGUAL at 21:18

## 2020-01-21 RX ADMIN — Medication 250 MG: at 21:18

## 2020-01-21 RX ADMIN — PANTOPRAZOLE SODIUM 40 MG: 40 TABLET, DELAYED RELEASE ORAL at 16:50

## 2020-01-21 RX ADMIN — INSULIN LISPRO 1 UNITS: 100 INJECTION, SOLUTION INTRAVENOUS; SUBCUTANEOUS at 21:29

## 2020-01-21 RX ADMIN — SILVER SULFADIAZINE: 10 CREAM TOPICAL at 09:13

## 2020-01-21 RX ADMIN — Medication 250 MG: at 09:07

## 2020-01-21 RX ADMIN — INSULIN GLARGINE 10 UNITS: 100 INJECTION, SOLUTION SUBCUTANEOUS at 21:26

## 2020-01-21 RX ADMIN — INSULIN LISPRO 4 UNITS: 100 INJECTION, SOLUTION INTRAVENOUS; SUBCUTANEOUS at 11:39

## 2020-01-21 RX ADMIN — BUSPIRONE HYDROCHLORIDE 30 MG: 5 TABLET ORAL at 21:17

## 2020-01-21 RX ADMIN — LAMOTRIGINE 150 MG: 100 TABLET ORAL at 21:18

## 2020-01-21 RX ADMIN — Medication 10 ML: at 09:08

## 2020-01-21 RX ADMIN — MELATONIN TAB 5 MG 5 MG: 5 TAB at 21:17

## 2020-01-21 RX ADMIN — INSULIN LISPRO 2 UNITS: 100 INJECTION, SOLUTION INTRAVENOUS; SUBCUTANEOUS at 16:48

## 2020-01-21 RX ADMIN — ACETYLCYSTEINE 1200 MG: 200 SOLUTION ORAL; RESPIRATORY (INHALATION) at 09:09

## 2020-01-21 RX ADMIN — MAGNESIUM OXIDE TAB 400 MG (241.3 MG ELEMENTAL MG) 400 MG: 400 (241.3 MG) TAB at 09:06

## 2020-01-21 ASSESSMENT — PAIN SCALES - GENERAL
PAINLEVEL_OUTOF10: 5
PAINLEVEL_OUTOF10: 0
PAINLEVEL_OUTOF10: 5
PAINLEVEL_OUTOF10: 0
PAINLEVEL_OUTOF10: 8

## 2020-01-21 ASSESSMENT — PAIN DESCRIPTION - ORIENTATION: ORIENTATION: MID;UPPER

## 2020-01-21 ASSESSMENT — PAIN DESCRIPTION - DESCRIPTORS: DESCRIPTORS: ACHING;PRESSURE

## 2020-01-21 ASSESSMENT — PAIN DESCRIPTION - FREQUENCY: FREQUENCY: INTERMITTENT

## 2020-01-21 ASSESSMENT — PAIN DESCRIPTION - LOCATION: LOCATION: BACK;ABDOMEN

## 2020-01-21 ASSESSMENT — PAIN DESCRIPTION - ONSET: ONSET: GRADUAL

## 2020-01-21 NOTE — CONSULTS
MELODY- 3.0 x 38 and 2.75 x 26 and 2.75 x 8 and 2.75 x 22 to the LAD    FEMORAL BYPASS Right 2019    RIGHT FEMORAL POPLITEAL ARTERY BYPASS GRAFT performed by Abbie Villanueva MD at [address]  2019    CardioMEMs insertion for CHF    OTHER SURGICAL HISTORY  10/08/2019    Bilateral LE angio with PTA occluded L SFA and restenting L Prox SFA    ROTATOR CUFF REPAIR Left 2016    TONSILLECTOMY      TUMOR EXCISION      benign tumors X 2 chest & axilla    UPPER GASTROINTESTINAL ENDOSCOPY  2013    BX BAYLEE dunne, gastritis    UPPER GASTROINTESTINAL ENDOSCOPY  12/10/2015    UPPER GASTROINTESTINAL ENDOSCOPY  2017    Gastritis       Medications Prior to Admission:   Medications Prior to Admission: torsemide (DEMADEX) 100 MG tablet, Take 1 tablet by mouth 2 times daily  spironolactone (ALDACTONE) 100 MG tablet, Take 1 tablet by mouth 2 times daily  metOLazone (ZAROXOLYN) 2.5 MG tablet, Take 1 tablet by mouth daily  [] clindamycin (CLEOCIN) 300 MG capsule, Take 1 capsule by mouth 4 times daily for 5 days  [] saccharomyces boulardii (FLORASTOR) 250 MG capsule, Take 1 capsule by mouth 2 times daily for 7 days  nystatin (MYCOSTATIN) 865218 UNIT/GM cream,   benztropine (COGENTIN) 1 MG tablet,   blood glucose test strips (EXACTECH TEST) strip, 6 times daily.   Insulin Pen Needle (B-D ULTRAFINE III SHORT PEN) 31G X 8 MM MISC, Five shots a day  INSULIN SYRINGE .5CC/29G 29G X 1/2\" 0.5 ML MISC, 1 each by Does not apply route daily  Insulin Degludec (TRESIBA FLEXTOUCH) 100 UNIT/ML SOPN, Inject 100 Units into the skin nightly  carvedilol (COREG) 3.125 MG tablet, TAKE 1 TABLET BY MOUTH TWICE A DAY WITH MEALS  Liraglutide (VICTOZA) 18 MG/3ML SOPN SC injection, Inject 1.2 mg into the skin daily  vitamin D (ERGOCALCIFEROL) 1.25 MG (40682 UT) CAPS capsule, Take 1 capsule by mouth once a week  albuterol sulfate  (90 Base) MCG/ACT inhaler, INHALE 2 PUFFS BY MOUTH EVERY 6 gout.   Respiratory:  No emphysema, asthma. +COPD, +SOB  Integumentary:  No dermatitis, itching, rash. Neurological:  No stroke, TIAs, seizures. Psychiatric: +depression, +anxiety. Endocrine: No thyroid issues. +T2DM  Hematologic: + bleeding, +easy bruising (chronic Plavix use). Immunologic:  No known cancer, steroid therapies. PHYSICAL EXAM:    VITALS:  BP (!) 86/55   Pulse 84   Temp 98 °F (36.7 °C) (Oral)   Resp 16   Ht 5' 4.5\" (1.638 m)   Wt 241 lb 6.4 oz (109.5 kg)   LMP 10/24/2012   SpO2 95%   BMI 40.80 kg/m²     Constitutional:   Well developed and nourished female. No acute distress. +Morbid obesity. Eyes:  lids and lashes normal, pupils equal, round and reactive to light, extra ocular muscles intact, sclera clear, conjunctiva normal. +eyeglasses    Head/ENT:  Some teeth missing on bottom jaw, +partial bottom denture, all upper teeth missing, normal gums, & palate. Moist mucus membranes. No cyanosis or pallor. Neck:  supple, symmetrical, trachea midline, no lymphadenopathy, no jugular venous distension, no carotid bruits and MASSES:  no masses. No thyromegaly. Lungs:  no increased work of breathing, good air exchange, no retractions and clear to auscultation, overall diminished. No tactile fremitus. Cardiovascular:  regular rate and rhythm, S1, S2 normal, no murmur, click, rub or gallop. Apical impulse in 5th intercostal space. Pulses:  Right dorsalis pedis 1, Left dorsalis pedis 1, Right posterior tibial 1, Left Posterior tibial 1, Right radial 1, and Left radial 1. Abdomen:  normal bowel sounds, non-tender, aorta normal and bruits absent. No hepatosplenomegaly or masses. Musculoskeletal:  Back is straight and non-tender, full ROM of upper and lower extremities. No kyphosis or scoliosis. Extremities:  Warm, pink, no clubbing, cyanosis, petechiae, ischemia, or deformities. 2+ non-pitting BLE peripheral edema.     Skin: no rashes, no ecchymoses, no petechiae, no nodules, no jaundice. +diabetic foot ulcerations bilateral.    Neurological/Psychiatric: oriented, normal, CN II-XII intact    DATA:  EK/18 Sinus rhythm with occasional Premature ventricular complexesLeft axis deviationRight bundle branch blockAbnormal ECGConfirmed by [facility] ASAD MENDES ([id number]) on 2020 3:01:32 PM     CBC with Differential:    Lab Results   Component Value Date    WBC 10.3 2020    RBC 3.34 2020    HGB 9.7 2020    HCT 29.6 2020     2020    MCV 88.5 2020    MCH 29.0 2020    MCHC 32.8 2020    RDW 15.7 2020    LYMPHOPCT 10.0 2020    MONOPCT 5.1 2020    EOSPCT 3 2018    BASOPCT 0.3 2020    MONOSABS 0.5 2020    LYMPHSABS 1.0 2020    EOSABS 0.2 2020    BASOSABS 0.0 2020     CMP:    Lab Results   Component Value Date     2020    K 4.3 2020    K 4.3 2020    CL 86 2020    CO2 27 2020    BUN 44 2020    CREATININE 1.8 2020    GFRAA 35 2020    AGRATIO 1.1 2020    LABGLOM 29 2020    GLUCOSE 142 2020    PROT 7.0 2020    LABALBU 3.7 2020    CALCIUM 9.5 2020    BILITOT 0.4 2020    ALKPHOS 86 2020    AST 8 2020    ALT 6 2020     Hepatic Function Panel:    Lab Results   Component Value Date    ALKPHOS 86 2020    ALT 6 2020    AST 8 2020    PROT 7.0 2020    BILITOT 0.4 2020    BILIDIR <0.2 2019    IBILI see below 2019    LABALBU 3.7 2020     Magnesium:    Lab Results   Component Value Date    MG 2.00 2020     Last 3 Troponin:    Lab Results   Component Value Date    TROPONINI 0.01 2020    TROPONINI <0.01 2020    TROPONINI 0.01 2020     CXR: 20   Impression   Cardiomegaly without evidence of acute cardiopulmonary process.      ECHO: pending     LHC: 20 with Dr. Denver Ortega   Left Heart Cath  Dominance :

## 2020-01-21 NOTE — PROGRESS NOTES
Inpatient Family Medicine Progress Note      PCP: Ana Sheth PA-C    Date of Admission: 1/17/2020    Chief Complaint:  Chest pain    Hospital Course: 62 y.o. female who presented to [facility] with strong chest pain that started around 0930 on 01/17. Started when she was getting ready to go to see Dr Derek Jacobson. Atlas like something sitting on her chest. Pain was 10/10, associated with shortness of breath and nausea. Took 2 Nitro at home which helped. Admitted to some pain with breathing and when walking x 2 days as well as some back pain. Has a history of previous MI, but she does not remember her previous MI. Denies any recent injuries but admits to falling last week but there was no pain at that time. Has had 3 MIs with 6 stents. Has had CardioMEMs. Was recently admitted with acute on chronic kidney disease, as well as ulcers on both feet. While here in the hospital, patient had cardiac cath that showed 4 vessel disease, and patient is awaiting evaluation from CTS. Subjective: Pt sitting up at bedside resting comfortably. Denies any chest pain currently. Denies any other issues or concerns. Heart cath to be performed today. Denies any fevers, chills, SOB, nausea, vomiting, abdominal pain, diarrhea, constipation, or dysuria.      Medications:  Reviewed    Infusion Medications    sodium chloride      dextrose       Scheduled Medications    silver sulfADIAZINE   Topical Daily    sodium chloride flush  10 mL Intravenous 2 times per day    acetylcysteine  1,200 mg Oral BID    insulin glargine  5 Units Subcutaneous Nightly    buprenorphine-naloxone  1 Film Sublingual Q12H    lamoTRIgine  150 mg Oral BID    ARIPiprazole  10 mg Oral Daily    aspirin EC  81 mg Oral Daily    atorvastatin  80 mg Oral Nightly    busPIRone  30 mg Oral BID    magnesium oxide  400 mg Oral Daily    pantoprazole  40 mg Oral BID    vitamin D  50,000 Units Oral Weekly    [Held by provider] carvedilol  3.125 mg Oral BID    benztropine  1 mg Oral Daily    saccharomyces boulardii  250 mg Oral BID    sodium chloride flush  10 mL Intravenous 2 times per day    tiotropium  18 mcg Inhalation Daily    insulin lispro  0-12 Units Subcutaneous TID     insulin lispro  0-6 Units Subcutaneous Nightly    nicotine  1 patch Transdermal Daily     PRN Meds: melatonin, sodium chloride flush, acetaminophen, nitroGLYCERIN, sodium chloride flush, magnesium hydroxide, ondansetron, glucose, dextrose, glucagon (rDNA), dextrose, albuterol sulfate HFA, acetaminophen      Intake/Output Summary (Last 24 hours) at 1/20/2020 1823  Last data filed at 1/20/2020 1235  Gross per 24 hour   Intake --   Output 2375 ml   Net -2375 ml       Physical Exam Performed:    /75   Pulse 85   Temp 98.4 °F (36.9 °C) (Oral)   Resp 16   Ht 5' 4.5\" (1.638 m)   Wt 241 lb 11.2 oz (109.6 kg)   LMP 10/24/2012   SpO2 98%   BMI 40.85 kg/m²     Physical Exam  Vitals signs reviewed. Constitutional:       General: She is not in acute distress. Appearance: Normal appearance. She is obese. She is not ill-appearing. HENT:      Head: Normocephalic and atraumatic. Eyes:      Conjunctiva/sclera: Conjunctivae normal.   Cardiovascular:      Rate and Rhythm: Normal rate and regular rhythm. Heart sounds: No murmur. No friction rub. No gallop. Pulmonary:      Effort: Pulmonary effort is normal.      Breath sounds: No wheezing, rhonchi or rales. Abdominal:      General: Abdomen is flat. Bowel sounds are normal.      Palpations: Abdomen is soft. Tenderness: There is no guarding or rebound. Musculoskeletal:         General: Tenderness (lower extremeties, mild) present. Right lower leg: Edema (2+ pitting, improved from admission) present. Left lower leg: Edema (2+ pitting,  improved from admission) present. Skin:     General: Skin is warm and dry.       Findings: Erythema ( lower extremities consistent with venous stasis, improved) L97.529] 01/18/2020    Hypoglycemia associated with type 2 diabetes mellitus ([facility] [E11.649] 01/18/2020     Coronary artery disease  - Cath performed 1/21, finding 70-80% blockage in LM, LAD, LCx, and 60% in RCA. - CTS consulted to discuss surgical intervention. Peripheral edema  - Improved  -Nephrology following.  - Currently off diuretics and gentle fluids as Nephrology is working to correct SHAUNNA. Chronic kidney disease, stage 3, recent SHAUNNA  - Was recently admitted for SHAUNNA with last creatine on discharge 3.2 (1/12/20)  - Creatinine 1.8 --> 1.9 --> 2.0 --> 1.9 --> 1.8 today   - Nephrology follwing.  - Avoid nephrotoxin     Hyponatremia  - Stable at 125.  - Nephrology consulted. - Patient on IV fluids.  - Diuretics held. Hypoglycemia (resolved)  - On Lantus 5U (down from home dose of 100 U) and SSI.  - Increasing Lantus to 10U tonight, and will continue to watch glucose to continue titrating care. Diabetic Foot Ulcers  - Wound care to manage. Does not seem infected or gangrenous. DVT Prophylaxis: Heparin  Diet: DIET RENAL  Code Status: Full Code  PT/OT Eval Status: n/a    Dispo - Awaiting CTS consult.     This patient was seen and evaluated with resident team and attending, Apolinar Campos [id number] [place] [address]  PGY-1  (available by [address] [place] [address])

## 2020-01-21 NOTE — PROGRESS NOTES
Educated patient and family on the new 1,000ml fluid restriction. Demonstrated how much 1,000ml really is. Patient verbalized understanding. Pt aware she can eat ice chips but they still count towards her fluid intake.  Mouth swabs provided at patients request.

## 2020-01-21 NOTE — PROGRESS NOTES
Post Cath Lab follow up completed by Cath Lab staff. Access site right Radial Artery site assessed and WNL. Post Cath restrictions reviewed, no further questions. Awaiting CABG MD consultation.

## 2020-01-21 NOTE — PROGRESS NOTES
[facility]   Daily Progress Note    Admit Date:  1/17/2020  HPI:    Chief Complaint   Patient presents with    Chest Pain     pt sts\" i think i had a heart attack this morning, i took a nitro and it helped and it was a pain i have never felt\" pain was all through chest and back      Presented from office to ED with chest pain, improved with sl nitro but not completely resolved. Troponin's negative for ischemia. ECG unremarkable. Patient well known to me with CAD, MR, sCHF, ICM, HLD, HTN as well as PAD, DM, CLINT and smoker (recently quit). She underwent placement of CardioMEMS on 5/20/71 without complication   Recently hospitalized with BLE edema, treated for cellulitis, diuresed then developed AoCKD. Diuretics were held then restarted 3 days post discharge. Subjective:  Ms. Nancy Liao sitting up on side of bed. No chest pain, breathing stable. + edema. Multiple questions per patient and family/ friend. Objective:   BP 96/67   Pulse 88   Temp 98.2 °F (36.8 °C) (Oral)   Resp 16   Ht 5' 4.5\" (1.638 m)   Wt 241 lb 6.4 oz (109.5 kg)   LMP 10/24/2012   SpO2 96%   BMI 40.80 kg/m²       Intake/Output Summary (Last 24 hours) at 1/21/2020 1052  Last data filed at 1/21/2020 1039  Gross per 24 hour   Intake 1080 ml   Output 2650 ml   Net -1570 ml     Wt Readings from Last 3 Encounters:   01/21/20 241 lb 6.4 oz (109.5 kg)   01/17/20 245 lb (111.1 kg)   01/16/20 251 lb (113.9 kg)         ASSESSMENT:   1. CAD - chest pain typical of angina, ECG and troponin negative for ischemia, LHC with severe LM, LAD and LCx disease - CVTS consult, holding Plavix  2. ICM - EF 40-45%, Coreg held for hypotension and AoCKD  3. CHF, chronic systolic - + edema but weight down, breathing improved. LVEDP 4 on [facility], will repeat CardioMems transmission today and review for comparison  4. CKD, acute on chronic - improving with holding diuretics and BP meds, appreciate nephrology input  5.  Hypotension - asymptomatic, involving the left lower    extremity. There is occlusive disease of the left proximal superficial    femoral artery (noted stent). There is a 30-49% stenosis at the left deep    femoral artery with evidence of inflow disease.         ECHO 4/3/19:   [place] systolic function is mildly reduced with EF estimated at 40-45%.   There is severe HK of the apex and apical-septal segment.   Normal left ventricular diastolic filling pressure.   Mild mitral regurgitation.   Systolic pulmonary artery pressure (SPAP) estimated at 32mmHg (RA pressure 3mmHg). Arterial doppler studies 8/2/18:   Alejandra Rast is no arterial insufficiency at rest involving the lower extremities    bilaterally, however, there is evidence to suggest calf vessel disease    bilaterally. [facility] 3/26/18 [employer] promus premier [id number] CCx and [id number] CCx. Echo: 5/23/18:    Ejection fraction is visually estimated to be 30-35 %. There is akinesis of the apex and inferior walls. Left ventricular size is mildly increased. Moderate mitral regurgitation. Moderate amount plaque is noted in the aorta. The left atrium is mildly dilated. 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 A bubble study was performed and fails to show evidence of shunting. Procedure performed: Transesophageal echocardiogram 5/25/18:    Findings:  Reduced left ventricular function with ejection fraction estimated at about 35% with apical and inferior akinesis    The cardiac valves show moderate mitral regurgitation  There is no evidence for an intracardiac shunt or intracardiac thrombus. Cardiac cath 5/25/18:   Procedure Performed:  1. Coronary angiogram  2. Left heart cath  3. Left ventriculogram  4. Right heart cath   Findings:  1. Patent LAD and CIRC stents              ~mild CAD  2. Reduced LVEF 30% with anterior and apical akinesis  3. Moderate pulmonary hyperension   Conclusion/plan of care:  1.  Medical management

## 2020-01-22 LAB
A/G RATIO: 1.1 (ref 1.1–2.2)
ALBUMIN SERPL-MCNC: 3.8 G/DL (ref 3.4–5)
ALP BLD-CCNC: 90 U/L (ref 40–129)
ALT SERPL-CCNC: 8 U/L (ref 10–40)
ANION GAP SERPL CALCULATED.3IONS-SCNC: 12 MMOL/L (ref 3–16)
AST SERPL-CCNC: 11 U/L (ref 15–37)
BASOPHILS ABSOLUTE: 0 K/UL (ref 0–0.2)
BASOPHILS RELATIVE PERCENT: 0.4 %
BILIRUB SERPL-MCNC: 0.4 MG/DL (ref 0–1)
BILIRUBIN URINE: NEGATIVE
BLOOD, URINE: NEGATIVE
BUN BLDV-MCNC: 43 MG/DL (ref 7–20)
CALCIUM SERPL-MCNC: 9.6 MG/DL (ref 8.3–10.6)
CHLORIDE BLD-SCNC: 89 MMOL/L (ref 99–110)
CLARITY: CLEAR
CO2: 26 MMOL/L (ref 21–32)
COLOR: YELLOW
CREAT SERPL-MCNC: 1.8 MG/DL (ref 0.6–1.1)
EOSINOPHILS ABSOLUTE: 0.2 K/UL (ref 0–0.6)
EOSINOPHILS RELATIVE PERCENT: 2.1 %
GFR AFRICAN AMERICAN: 35
GFR NON-AFRICAN AMERICAN: 29
GLOBULIN: 3.6 G/DL
GLUCOSE BLD-MCNC: 129 MG/DL (ref 70–99)
GLUCOSE BLD-MCNC: 157 MG/DL (ref 70–99)
GLUCOSE BLD-MCNC: 191 MG/DL (ref 70–99)
GLUCOSE BLD-MCNC: 227 MG/DL (ref 70–99)
GLUCOSE URINE: NEGATIVE MG/DL
HCT VFR BLD CALC: 29.8 % (ref 36–48)
HEMOGLOBIN: 9.8 G/DL (ref 12–16)
KETONES, URINE: NEGATIVE MG/DL
LEUKOCYTE ESTERASE, URINE: NEGATIVE
LV EF: 25 %
LVEF MODALITY: NORMAL
LYMPHOCYTES ABSOLUTE: 1.1 K/UL (ref 1–5.1)
LYMPHOCYTES RELATIVE PERCENT: 10.9 %
MCH RBC QN AUTO: 29.3 PG (ref 26–34)
MCHC RBC AUTO-ENTMCNC: 32.9 G/DL (ref 31–36)
MCV RBC AUTO: 89 FL (ref 80–100)
MICROSCOPIC EXAMINATION: NORMAL
MONOCYTES ABSOLUTE: 0.6 K/UL (ref 0–1.3)
MONOCYTES RELATIVE PERCENT: 6.6 %
NEUTROPHILS ABSOLUTE: 7.8 K/UL (ref 1.7–7.7)
NEUTROPHILS RELATIVE PERCENT: 80 %
NITRITE, URINE: NEGATIVE
PDW BLD-RTO: 16.2 % (ref 12.4–15.4)
PERFORMED ON: ABNORMAL
PH UA: 7 (ref 5–8)
PLATELET # BLD: 351 K/UL (ref 135–450)
PMV BLD AUTO: 8.5 FL (ref 5–10.5)
POTASSIUM REFLEX MAGNESIUM: 4.4 MMOL/L (ref 3.5–5.1)
PROTEIN UA: NEGATIVE MG/DL
RBC # BLD: 3.35 M/UL (ref 4–5.2)
SODIUM BLD-SCNC: 127 MMOL/L (ref 136–145)
SPECIFIC GRAVITY UA: <=1.005 (ref 1–1.03)
TOTAL PROTEIN: 7.4 G/DL (ref 6.4–8.2)
URINE REFLEX TO CULTURE: NORMAL
URINE TYPE: NORMAL
UROBILINOGEN, URINE: 0.2 E.U./DL
WBC # BLD: 9.7 K/UL (ref 4–11)

## 2020-01-22 PROCEDURE — 6370000000 HC RX 637 (ALT 250 FOR IP): Performed by: THORACIC SURGERY (CARDIOTHORACIC VASCULAR SURGERY)

## 2020-01-22 PROCEDURE — 99233 SBSQ HOSP IP/OBS HIGH 50: CPT | Performed by: NURSE PRACTITIONER

## 2020-01-22 PROCEDURE — 6370000000 HC RX 637 (ALT 250 FOR IP): Performed by: STUDENT IN AN ORGANIZED HEALTH CARE EDUCATION/TRAINING PROGRAM

## 2020-01-22 PROCEDURE — 94729 DIFFUSING CAPACITY: CPT

## 2020-01-22 PROCEDURE — 2580000003 HC RX 258: Performed by: STUDENT IN AN ORGANIZED HEALTH CARE EDUCATION/TRAINING PROGRAM

## 2020-01-22 PROCEDURE — 80053 COMPREHEN METABOLIC PANEL: CPT

## 2020-01-22 PROCEDURE — 1200000000 HC SEMI PRIVATE

## 2020-01-22 PROCEDURE — 93931 UPPER EXTREMITY STUDY: CPT

## 2020-01-22 PROCEDURE — 81003 URINALYSIS AUTO W/O SCOPE: CPT

## 2020-01-22 PROCEDURE — 94726 PLETHYSMOGRAPHY LUNG VOLUMES: CPT

## 2020-01-22 PROCEDURE — 85025 COMPLETE CBC W/AUTO DIFF WBC: CPT

## 2020-01-22 PROCEDURE — 93971 EXTREMITY STUDY: CPT

## 2020-01-22 PROCEDURE — C8929 TTE W OR WO FOL WCON,DOPPLER: HCPCS

## 2020-01-22 PROCEDURE — 94060 EVALUATION OF WHEEZING: CPT

## 2020-01-22 PROCEDURE — 36415 COLL VENOUS BLD VENIPUNCTURE: CPT

## 2020-01-22 PROCEDURE — 2580000003 HC RX 258: Performed by: INTERNAL MEDICINE

## 2020-01-22 PROCEDURE — 94760 N-INVAS EAR/PLS OXIMETRY 1: CPT

## 2020-01-22 RX ORDER — ALBUTEROL SULFATE 90 UG/1
4 AEROSOL, METERED RESPIRATORY (INHALATION) ONCE
Status: COMPLETED | OUTPATIENT
Start: 2020-01-22 | End: 2020-01-22

## 2020-01-22 RX ORDER — INSULIN GLARGINE 100 [IU]/ML
12 INJECTION, SOLUTION SUBCUTANEOUS NIGHTLY
Status: DISCONTINUED | OUTPATIENT
Start: 2020-01-22 | End: 2020-01-23

## 2020-01-22 RX ADMIN — LAMOTRIGINE 150 MG: 100 TABLET ORAL at 21:12

## 2020-01-22 RX ADMIN — Medication 10 ML: at 20:49

## 2020-01-22 RX ADMIN — BUPRENORPHINE AND NALOXONE 1 FILM: 8; 2 FILM, SOLUBLE BUCCAL; SUBLINGUAL at 21:13

## 2020-01-22 RX ADMIN — Medication 250 MG: at 21:12

## 2020-01-22 RX ADMIN — BUSPIRONE HYDROCHLORIDE 30 MG: 5 TABLET ORAL at 21:37

## 2020-01-22 RX ADMIN — MELATONIN TAB 5 MG 5 MG: 5 TAB at 21:11

## 2020-01-22 RX ADMIN — ARIPIPRAZOLE 10 MG: 10 TABLET ORAL at 10:22

## 2020-01-22 RX ADMIN — Medication 10 ML: at 10:20

## 2020-01-22 RX ADMIN — MAGNESIUM OXIDE TAB 400 MG (241.3 MG ELEMENTAL MG) 400 MG: 400 (241.3 MG) TAB at 10:26

## 2020-01-22 RX ADMIN — BENZTROPINE MESYLATE 1 MG: 1 TABLET ORAL at 10:17

## 2020-01-22 RX ADMIN — PANTOPRAZOLE SODIUM 40 MG: 40 TABLET, DELAYED RELEASE ORAL at 10:17

## 2020-01-22 RX ADMIN — ATORVASTATIN CALCIUM 80 MG: 80 TABLET, FILM COATED ORAL at 21:12

## 2020-01-22 RX ADMIN — Medication 10 ML: at 10:27

## 2020-01-22 RX ADMIN — Medication 250 MG: at 10:16

## 2020-01-22 RX ADMIN — ASPIRIN 81 MG: 81 TABLET, COATED ORAL at 10:15

## 2020-01-22 RX ADMIN — BUSPIRONE HYDROCHLORIDE 30 MG: 5 TABLET ORAL at 10:16

## 2020-01-22 RX ADMIN — BUPRENORPHINE AND NALOXONE 1 FILM: 8; 2 FILM, SOLUBLE BUCCAL; SUBLINGUAL at 10:17

## 2020-01-22 RX ADMIN — INSULIN GLARGINE 12 UNITS: 100 INJECTION, SOLUTION SUBCUTANEOUS at 22:39

## 2020-01-22 RX ADMIN — LAMOTRIGINE 150 MG: 100 TABLET ORAL at 10:15

## 2020-01-22 RX ADMIN — PANTOPRAZOLE SODIUM 40 MG: 40 TABLET, DELAYED RELEASE ORAL at 16:40

## 2020-01-22 RX ADMIN — INSULIN LISPRO 1 UNITS: 100 INJECTION, SOLUTION INTRAVENOUS; SUBCUTANEOUS at 21:17

## 2020-01-22 RX ADMIN — INSULIN LISPRO 4 UNITS: 100 INJECTION, SOLUTION INTRAVENOUS; SUBCUTANEOUS at 16:40

## 2020-01-22 RX ADMIN — ACETAMINOPHEN 650 MG: 325 TABLET ORAL at 23:47

## 2020-01-22 RX ADMIN — Medication 4 PUFF: at 14:14

## 2020-01-22 ASSESSMENT — PAIN SCALES - GENERAL
PAINLEVEL_OUTOF10: 0
PAINLEVEL_OUTOF10: 5
PAINLEVEL_OUTOF10: 0

## 2020-01-22 ASSESSMENT — PAIN DESCRIPTION - PAIN TYPE: TYPE: ACUTE PAIN

## 2020-01-22 ASSESSMENT — PAIN DESCRIPTION - LOCATION: LOCATION: HEAD

## 2020-01-22 NOTE — PROGRESS NOTES
General: Skin is warm and dry. Findings: Erythema ( lower extremities consistent with venous stasis, improved) present. Comments: Noted diabetic ulcers on both feet. Please see wound care note for images and full details. Neurological:      General: No focal deficit present. Mental Status: She is alert and oriented to person, place, and time. Psychiatric:         Mood and Affect: Mood normal.         Behavior: Behavior normal.           Labs:   Recent Labs     01/18/20  0842 01/19/20  0640 01/20/20  0748   WBC 9.4 10.7 13.0*   HGB 10.0* 10.0* 9.9*   HCT 30.2* 29.8* 30.6*    404 402     Recent Labs     01/19/20  0640 01/20/20  0209 01/20/20  0748   * 123* 125*   K 3.8 4.3 4.5   CL 85* 81* 84*   CO2 29 29 27   BUN 53* 51* 49*   CREATININE 2.0* 2.1* 1.9*   CALCIUM 9.2 9.4 9.6     Recent Labs     01/18/20  0842 01/19/20  0640 01/20/20  0748   AST 11* 9* 6*   ALT <5* 7* 6*   BILITOT 0.4 0.4 0.5   ALKPHOS 97 96 95     No results for input(s): INR in the last 72 hours. Recent Labs     01/17/20 2037   TROPONINI 0.01       Urinalysis:      Lab Results   Component Value Date    NITRU Negative 01/17/2020    WBCUA 3-5 09/28/2019    BACTERIA Rare 09/28/2019    RBCUA None seen 09/28/2019    BLOODU Negative 01/17/2020    SPECGRAV 1.010 01/17/2020    GLUCOSEU Negative 01/17/2020       Radiology:  XR CHEST STANDARD (2 VW)   Final Result   Cardiomegaly without evidence of acute cardiopulmonary process. Echo (1/22/2020)  Technically difficult examination secondary to habitus. The left ventricular systolic function is severely reduced with an ejection fraction of 25%. There is severe hypokinesis of the anterior and apical walls consistent with infarction within the territory of the left anterior descending artery. Grade II diastolic dysfunction with elevated left ventricular filling pressure. Moderate to severe mitral regurgitation. Mild tricuspid regurgitation.   Is estimated at 53 mmHg

## 2020-01-22 NOTE — FLOWSHEET NOTE
times d/t neuropathy)   R Post Tibial Pulse +1   R Pedal Pulse +1   R Calf Tenderness  Negative   LLE Neurovascular Assessment   Capillary Refill Less than/equal to 3 seconds   Color Red   Temperature Warm   Sensation LLE Decreased  (decerased @ times d/t neuropathy)   L Post Tibial Pulse +1   L Pedal Pulse +1   L Calf Tenderness Negative   Puncture Site Assessment 1   Location Radial - right   Site Assessment No redness, drainage, swelling or hematoma   Hemostasis Intervention Discontinued   Dressing Applied Transparent occlusive dressing   Skin Color/Condition   Skin Color/Condition (WDL) X  (redness BLE)   Skin Color Appropriate for ethnicity;Pink;Red  (reddened in BLE near ankles)   Skin Condition/Temp Dry;Warm;Swollen  (+1 swelling BLE)   Skin Integrity   Skin Integrity (WDL) X   Skin Integrity Other (Comment)  (diabetic foot ulcers, scattered scabs & bruising)   Location Foot   Preventative Dressing No   Multiple Skin Integrity Sites Yes   Skin Fold Management No   Skin Integrity Site 2   Skin Integrity Location 2 Bruising   Location 2 Scattered   Preventative Dressing No   Assessed this shift? Yes   Skin Integrity Site 3   Skin Integrity Location 3 Redness    Location 3 BLE   Preventative Dressing No   Assessed this shift? Yes   Skin Integrity Site 4   Skin Integrity Location 4 Other (Comment)  (Diabetic foot ulcers)   Location 4 Right foot   Preventative Dressing No   Assessed this shift? Yes   Skin Integrity Site 5   Skin Integrity Location 5 Other (Comment)  (Scabs)   Location 5 Scattered; BLE   Preventative Dressing No   Assessed this shift?  Yes   Musculoskeletal   Musculoskeletal (WDL) X   RUE Full movement   LUE Full movement   RL Extremity Full movement;Weakness   LL Extremity Full movement;Weakness   Genitourinary   Genitourinary (WDL) WDL   Flank Tenderness No   Suprapubic Tenderness No   Dysuria No   Urine Assessment   Incontinence No   Urine Color Yellow/straw   Urine Appearance Clear   Urine Odor No odor   Anus/Rectum   Anus/Rectum (WDL) WDL   Wound 01/17/20 Toe (Comment  which one) Anterior; Left Closed brown plantar 3rd toe   Date First Assessed/Time First Assessed: 01/17/20 1745   Present on Hospital Admission: Yes  Primary Wound Type: Diabetic Ulcer  Location: (c) Toe (Comment  which one)  Wound Location Orientation: Anterior; Left  Wound Description (Comments): Closed br. .. Dressing Status Other (Comment)  (none in place; pt refused)   Dressing/Treatment Open to air   Wound Assessment Dry;Brown   Drainage Amount None   Odor None   Shaye-wound Assessment Clean;Dry; Intact   Wound 01/20/20 Toe (Comment  which one) Anterior; Left 1st dorsal toe lateral edge of nail, brown & dry   Date First Assessed/Time First Assessed: 01/20/20 0958   Present on Hospital Admission: Yes  Primary Wound Type: Diabetic Ulcer  Location: Toe (Comment  which one)  Wound Location Orientation: Anterior; Left  Wound Description (Comments): 1st dorsal to. .. Dressing Status Other (Comment)  (Pt refused)   Dressing/Treatment Open to air   Wound Assessment Dry; Intact; Brown   Drainage Amount None   Odor None   Shaye-wound Assessment Clean;Dry; Intact   Wound 01/20/20 Toe (Comment  which one) Anterior; Left raised clear filled bulla 2nd inner dorsal toe   Date First Assessed/Time First Assessed: 01/20/20 1002   Present on Hospital Admission: Yes  Primary Wound Type: Pressure Injury  Location: Toe (Comment  which one)  Wound Location Orientation: Anterior; Left  Wound Description (Comments): raised clear. .. Dressing Status   (pt refused)   Dressing/Treatment Open to air   Odor None   Shaye-wound Assessment Clean;Dry; Intact   Wound 01/20/20 Foot Right;Plantar;Lateral brown, dry, closed   Date First Assessed: 01/20/20   Present on Hospital Admission: Yes  Primary Wound Type: Diabetic Ulcer  Location: Foot  Wound Location Orientation: Right;Plantar;Lateral  Wound Description (Comments): brown, dry, closed   Wound Diabetic   Dressing Status Other (Comment)  (pt refused dressing)   Dressing/Treatment Open to air   Wound Assessment Dry;Brown   Drainage Amount None   Odor None   Psychosocial   Psychosocial (WDL) WDL   Needs Expressed Emotional;Physical   Patient Behaviors Tearful; Appropriate for age; Cooperative

## 2020-01-22 NOTE — PLAN OF CARE
Problem: Falls - Risk of:  Goal: Will remain free from falls  Description  Will remain free from falls  Outcome: Ongoing     Problem: Falls - Risk of:  Goal: Absence of physical injury  Description  Absence of physical injury  Outcome: Ongoing     Problem: Infection:  Goal: Will remain free from infection  Description  Will remain free from infection  Outcome: Ongoing     Problem: Safety:  Goal: Free from accidental physical injury  Description  Free from accidental physical injury  Outcome: Ongoing     Problem: Safety:  Goal: Free from intentional harm  Description  Free from intentional harm  Outcome: Ongoing     Problem: Daily Care:  Goal: Daily care needs are met  Description  Daily care needs are met  Outcome: Ongoing

## 2020-01-22 NOTE — PROGRESS NOTES
[facility]   Daily Progress Note    Admit Date:  1/17/2020  HPI:    Chief Complaint   Patient presents with    Chest Pain     pt sts\" i think i had a heart attack this morning, i took a nitro and it helped and it was a pain i have never felt\" pain was all through chest and back      Presented from office to ED with chest pain, improved with sl nitro but not completely resolved. Troponin's negative for ischemia. ECG unremarkable. Patient well known to me with CAD, MR, sCHF, ICM, HLD, HTN as well as PAD, DM, CLINT and smoker (recently quit). She underwent placement of CardioMEMS on 4/73/44 without complication   Recently hospitalized with BLE edema, treated for cellulitis, diuresed then developed AoCKD. Diuretics were held then restarted 3 days post discharge. Subjective:  Ms. Derian Epstein reports some shortness of breath overnight. No chest pain. Objective:   /73   Pulse 90   Temp 97.9 °F (36.6 °C) (Oral)   Resp 18   Ht 5' 4.5\" (1.638 m)   Wt 242 lb 8 oz (110 kg)   LMP 10/24/2012   SpO2 98%   BMI 40.98 kg/m²       Intake/Output Summary (Last 24 hours) at 1/22/2020 1113  Last data filed at 1/22/2020 0915  Gross per 24 hour   Intake 420 ml   Output 2725 ml   Net -2305 ml     Wt Readings from Last 3 Encounters:   01/22/20 242 lb 8 oz (110 kg)   01/17/20 245 lb (111.1 kg)   01/16/20 251 lb (113.9 kg)         ASSESSMENT:   1. CAD - chest pain typical of angina, ECG and troponin negative for ischemia, LHC with severe LM, LAD and LCx disease - CVTS consult, holding Plavix - plan CABG on Monday (1/27/2020)  2. ICM - EF 40-45%, Coreg and Entresto held for hypotension and AoCKD, repeat echo with EF 25%  3. CHF, chronic systolic - + edema, weight up, + SOB. 4. CKD, acute on chronic - improving with holding diuretics and BP meds, appreciate nephrology input  5. MR, moderate to severe -   6. Hypotension - asymptomatic, holding Coreg and Entresto  7.  Hyponatremia - improving with fluid 4.4   CO2 27 27 26   BUN 49* 44* 43*   CREATININE 1.9* 1.8* 1.8*     INR:  No results for input(s): INR in the last 72 hours. BNP:    Recent Labs     01/20/20  0209   PROBNP 2,312*     Cardiac Enzymes:   No results for input(s): TROPONINI in the last 72 hours. Lipids:   Lab Results   Component Value Date    TRIG 140 01/11/2020    TRIG 278 10/21/2019    HDL 27 01/11/2020    HDL 31 10/21/2019    LDLCALC 103 01/11/2020    LDLCALC 91 10/21/2019    LDLDIRECT 187 08/01/2017       Cardiac Imaging:    ECHO 1/22/2020:    Technically difficult examination secondary to habitus. The left ventricular systolic function is severely reduced with an ejection fraction of 25 %. There is severe hypokinesis of the anterior and apical walls consistent with infarction within the territory of the left anterior descending artery. Grade II diastolic dysfunction with elevated left ventricular filling pressure. Moderate to severe mitral regurgitation. Mild tricuspid regurgitation. RVSP is estimated at 53 mmHg consistent with moderate pulmonary hypertension (Right atrial pressure of 3 mmHg). If clinically necessary, consider URI for better evaluation of mitral regurgitation. CARDIAC CATH 1/20/2020:   Left Heart Cath  Dominance : Right     LM: 70-80% short distal stenosis     LAD: 70-80% short ostial stenosis, extending into takeoff of high first diagonal; large proximal to mid stented segment patent with jailed second diagonal  (80% ostial D2) and 70% in stent restenosis of most distal stent; distal to near apical LAD with minimal disease     LCx: 70-80% short ostial stenosis, prior to patent proximal to mid stents      RCA: large, dominant vessel with long 60% proximal to mid stenosis      LVEDP: 4 mmHG  LVG not done due to CKD.     Impression/Recommendations:  Diabetic with previous LAD and LCx stenting in 2018 returns with unstable angina, in stent restenosis and Left Main bifurcation disease.    Recommend CTS evaluation

## 2020-01-22 NOTE — PROCEDURES
[address] [address]                               PULMONARY FUNCTION    PATIENT NAME: Demario BADILLO                    :        1965  MED REC NO:   [id number]                          ROOM:       8951  ACCOUNT NO:   [de-identified]                           ADMIT DATE: 2020  PROVIDER:     Melisa Mathew MD    DATE OF PROCEDURE:  2020    PFT    Spirometry showed FVC 1.91 L, 54% predicted, FEV1 1.39 L, 50% predicted,  with ratio of 73%. There was no response to bronchodilator. Lung  volumes showed TLC of 52% predicted and DLCO 60% predicted. IMPRESSION:  PFT shows a moderately severe restrictive defect with no  definite evidence of obstruction. There is reduction in diffusion  capacity which could represent an interstitial process. Clinical  correlation is recommended.         Ashleigh Bunn MD    D: 2020 16:45:59       T: 2020 17:57:10     [email or web address]  Job#: [id number]     Doc#: [id number]    CC:  CHERIE Ponce

## 2020-01-22 NOTE — PROGRESS NOTES
Peripheral IV removed from Left forearm d/t infiltration. No complications, pt tolerated well. Pressure held, dressing applied.

## 2020-01-23 LAB
A/G RATIO: 1.1 (ref 1.1–2.2)
ALBUMIN SERPL-MCNC: 3.8 G/DL (ref 3.4–5)
ALP BLD-CCNC: 88 U/L (ref 40–129)
ALT SERPL-CCNC: 6 U/L (ref 10–40)
ANION GAP SERPL CALCULATED.3IONS-SCNC: 13 MMOL/L (ref 3–16)
AST SERPL-CCNC: 6 U/L (ref 15–37)
BASOPHILS ABSOLUTE: 0.1 K/UL (ref 0–0.2)
BASOPHILS RELATIVE PERCENT: 0.5 %
BILIRUB SERPL-MCNC: 0.4 MG/DL (ref 0–1)
BUN BLDV-MCNC: 46 MG/DL (ref 7–20)
CALCIUM SERPL-MCNC: 9.5 MG/DL (ref 8.3–10.6)
CHLORIDE BLD-SCNC: 91 MMOL/L (ref 99–110)
CO2: 25 MMOL/L (ref 21–32)
CREAT SERPL-MCNC: 1.5 MG/DL (ref 0.6–1.1)
EOSINOPHILS ABSOLUTE: 0.2 K/UL (ref 0–0.6)
EOSINOPHILS RELATIVE PERCENT: 1.9 %
GFR AFRICAN AMERICAN: 43
GFR NON-AFRICAN AMERICAN: 36
GLOBULIN: 3.4 G/DL
GLUCOSE BLD-MCNC: 131 MG/DL (ref 70–99)
GLUCOSE BLD-MCNC: 155 MG/DL (ref 70–99)
GLUCOSE BLD-MCNC: 177 MG/DL (ref 70–99)
GLUCOSE BLD-MCNC: 184 MG/DL (ref 70–99)
GLUCOSE BLD-MCNC: 206 MG/DL (ref 70–99)
HCT VFR BLD CALC: 28.3 % (ref 36–48)
HEMOGLOBIN: 9.3 G/DL (ref 12–16)
LYMPHOCYTES ABSOLUTE: 1 K/UL (ref 1–5.1)
LYMPHOCYTES RELATIVE PERCENT: 9.3 %
MCH RBC QN AUTO: 29.1 PG (ref 26–34)
MCHC RBC AUTO-ENTMCNC: 33 G/DL (ref 31–36)
MCV RBC AUTO: 88.1 FL (ref 80–100)
MONOCYTES ABSOLUTE: 0.7 K/UL (ref 0–1.3)
MONOCYTES RELATIVE PERCENT: 6 %
NEUTROPHILS ABSOLUTE: 9 K/UL (ref 1.7–7.7)
NEUTROPHILS RELATIVE PERCENT: 82.3 %
PDW BLD-RTO: 16.2 % (ref 12.4–15.4)
PERFORMED ON: ABNORMAL
PLATELET # BLD: 315 K/UL (ref 135–450)
PMV BLD AUTO: 8.3 FL (ref 5–10.5)
POTASSIUM REFLEX MAGNESIUM: 4.4 MMOL/L (ref 3.5–5.1)
RBC # BLD: 3.21 M/UL (ref 4–5.2)
SODIUM BLD-SCNC: 129 MMOL/L (ref 136–145)
TOTAL PROTEIN: 7.2 G/DL (ref 6.4–8.2)
WBC # BLD: 10.9 K/UL (ref 4–11)

## 2020-01-23 PROCEDURE — 6370000000 HC RX 637 (ALT 250 FOR IP): Performed by: STUDENT IN AN ORGANIZED HEALTH CARE EDUCATION/TRAINING PROGRAM

## 2020-01-23 PROCEDURE — 80053 COMPREHEN METABOLIC PANEL: CPT

## 2020-01-23 PROCEDURE — 2500000003 HC RX 250 WO HCPCS: Performed by: STUDENT IN AN ORGANIZED HEALTH CARE EDUCATION/TRAINING PROGRAM

## 2020-01-23 PROCEDURE — 2580000003 HC RX 258: Performed by: STUDENT IN AN ORGANIZED HEALTH CARE EDUCATION/TRAINING PROGRAM

## 2020-01-23 PROCEDURE — 85025 COMPLETE CBC W/AUTO DIFF WBC: CPT

## 2020-01-23 PROCEDURE — 1200000000 HC SEMI PRIVATE

## 2020-01-23 PROCEDURE — 94640 AIRWAY INHALATION TREATMENT: CPT

## 2020-01-23 PROCEDURE — 99232 SBSQ HOSP IP/OBS MODERATE 35: CPT | Performed by: NURSE PRACTITIONER

## 2020-01-23 PROCEDURE — 2580000003 HC RX 258: Performed by: INTERNAL MEDICINE

## 2020-01-23 PROCEDURE — 93880 EXTRACRANIAL BILAT STUDY: CPT

## 2020-01-23 RX ORDER — INSULIN GLARGINE 100 [IU]/ML
15 INJECTION, SOLUTION SUBCUTANEOUS NIGHTLY
Status: DISCONTINUED | OUTPATIENT
Start: 2020-01-23 | End: 2020-01-26

## 2020-01-23 RX ADMIN — MAGNESIUM OXIDE TAB 400 MG (241.3 MG ELEMENTAL MG) 400 MG: 400 (241.3 MG) TAB at 08:36

## 2020-01-23 RX ADMIN — MELATONIN TAB 5 MG 5 MG: 5 TAB at 02:17

## 2020-01-23 RX ADMIN — Medication 10 ML: at 08:41

## 2020-01-23 RX ADMIN — ARIPIPRAZOLE 10 MG: 10 TABLET ORAL at 08:36

## 2020-01-23 RX ADMIN — Medication 250 MG: at 08:36

## 2020-01-23 RX ADMIN — ATORVASTATIN CALCIUM 80 MG: 80 TABLET, FILM COATED ORAL at 21:54

## 2020-01-23 RX ADMIN — INSULIN LISPRO 2 UNITS: 100 INJECTION, SOLUTION INTRAVENOUS; SUBCUTANEOUS at 17:20

## 2020-01-23 RX ADMIN — BUPRENORPHINE AND NALOXONE 1 FILM: 8; 2 FILM, SOLUBLE BUCCAL; SUBLINGUAL at 08:37

## 2020-01-23 RX ADMIN — LAMOTRIGINE 150 MG: 100 TABLET ORAL at 08:36

## 2020-01-23 RX ADMIN — Medication 10 ML: at 22:04

## 2020-01-23 RX ADMIN — BUSPIRONE HYDROCHLORIDE 30 MG: 5 TABLET ORAL at 08:36

## 2020-01-23 RX ADMIN — TIOTROPIUM BROMIDE 18 MCG: 18 CAPSULE ORAL; RESPIRATORY (INHALATION) at 07:32

## 2020-01-23 RX ADMIN — ACETAMINOPHEN 650 MG: 325 TABLET ORAL at 21:55

## 2020-01-23 RX ADMIN — INSULIN GLARGINE 15 UNITS: 100 INJECTION, SOLUTION SUBCUTANEOUS at 22:00

## 2020-01-23 RX ADMIN — Medication 250 MG: at 21:55

## 2020-01-23 RX ADMIN — BENZTROPINE MESYLATE 1 MG: 1 TABLET ORAL at 08:36

## 2020-01-23 RX ADMIN — PANTOPRAZOLE SODIUM 40 MG: 40 TABLET, DELAYED RELEASE ORAL at 16:14

## 2020-01-23 RX ADMIN — PANTOPRAZOLE SODIUM 40 MG: 40 TABLET, DELAYED RELEASE ORAL at 08:36

## 2020-01-23 RX ADMIN — INSULIN LISPRO 4 UNITS: 100 INJECTION, SOLUTION INTRAVENOUS; SUBCUTANEOUS at 11:29

## 2020-01-23 RX ADMIN — LAMOTRIGINE 150 MG: 100 TABLET ORAL at 21:55

## 2020-01-23 RX ADMIN — BUPRENORPHINE AND NALOXONE 1 FILM: 8; 2 FILM, SOLUBLE BUCCAL; SUBLINGUAL at 21:54

## 2020-01-23 RX ADMIN — BUSPIRONE HYDROCHLORIDE 30 MG: 5 TABLET ORAL at 21:55

## 2020-01-23 RX ADMIN — SILVER SULFADIAZINE: 10 CREAM TOPICAL at 14:16

## 2020-01-23 RX ADMIN — INSULIN LISPRO 1 UNITS: 100 INJECTION, SOLUTION INTRAVENOUS; SUBCUTANEOUS at 21:58

## 2020-01-23 RX ADMIN — Medication 10 ML: at 08:42

## 2020-01-23 RX ADMIN — MELATONIN TAB 5 MG 5 MG: 5 TAB at 21:55

## 2020-01-23 ASSESSMENT — PAIN DESCRIPTION - LOCATION
LOCATION: GENERALIZED
LOCATION: HEAD
LOCATION: GENERALIZED;HEAD;BACK

## 2020-01-23 ASSESSMENT — PAIN DESCRIPTION - DESCRIPTORS
DESCRIPTORS: ACHING;HEADACHE
DESCRIPTORS: ACHING

## 2020-01-23 ASSESSMENT — PAIN SCALES - GENERAL
PAINLEVEL_OUTOF10: 5
PAINLEVEL_OUTOF10: 1
PAINLEVEL_OUTOF10: 0
PAINLEVEL_OUTOF10: 4

## 2020-01-23 ASSESSMENT — PAIN DESCRIPTION - PAIN TYPE
TYPE: ACUTE PAIN;CHRONIC PAIN
TYPE: ACUTE PAIN;CHRONIC PAIN

## 2020-01-23 NOTE — PLAN OF CARE
Problem: Pain:  Goal: Patient's pain/discomfort is manageable  Description  Patient's pain/discomfort is manageable  1/23/2020 0242 by Sharyle Columbus, RN  Outcome: Met This Shift  Goal: Control of acute pain  Description  Control of acute pain  1/23/2020 0242 by Sharyle Columbus, RN  Outcome: Met This Shift     Problem: Falls - Risk of:  Goal: Will remain free from falls  Description  Will remain free from falls  1/23/2020 0756 by Rakesh Beauchamp RN  Outcome: Ongoing  1/23/2020 0242 by Sharyle Columbus, RN  Outcome: Ongoing  Goal: Absence of physical injury  Description  Absence of physical injury  1/23/2020 0242 by Sharyle Columbus, RN  Outcome: Ongoing     Problem: Infection:  Goal: Will remain free from infection  Description  Will remain free from infection  1/23/2020 0756 by Rakesh Beauchamp RN  Outcome: Ongoing  1/23/2020 0242 by Sharyle Columbus, RN  Outcome: Ongoing     Problem: Safety:  Goal: Free from accidental physical injury  Description  Free from accidental physical injury  1/23/2020 0242 by Sharyle Columbus, RN  Outcome: Ongoing  Goal: Free from intentional harm  Description  Free from intentional harm  1/23/2020 0242 by Sharyle Columbus, RN  Outcome: Ongoing     Problem: Daily Care:  Goal: Daily care needs are met  Description  Daily care needs are met  1/23/2020 0756 by Rakesh Beauchamp RN  Outcome: Ongoing  1/23/2020 0242 by Sharyle Columbus, RN  Outcome: Ongoing     Problem: Pain:  Goal: Pain level will decrease  Description  Pain level will decrease  1/23/2020 0756 by Rakesh Beauchamp RN  Outcome: Ongoing  1/23/2020 0242 by Sharyle Columbus, RN  Outcome: Ongoing  Goal: Control of chronic pain  Description  Control of chronic pain  1/23/2020 0242 by Sharyle Columbus, RN  Outcome: Ongoing     Problem: Skin Integrity:  Goal: Skin integrity will stabilize  Description  Skin integrity will stabilize  1/23/2020 0756 by Rakesh Beauchamp RN  Outcome: Ongoing  1/23/2020 0242 by Sharyle Columbus, RN  Outcome: Ongoing     Problem:

## 2020-01-23 NOTE — PROGRESS NOTES
Department of Internal Medicine  Nephrology Progress Note        SUBJECTIVE:    We are following this patient for A/CKD. The patient was seen and examined; she feels well today with no CP, SOB, nausea or vomiting. ROS: No fever or chills. Social: Family at bedside. Physical Exam:    VITALS:  /72   Pulse 82   Temp 98 °F (36.7 °C) (Oral)   Resp 16   Ht 5' 4.5\" (1.638 m)   Wt 242 lb 14.4 oz (110.2 kg)   LMP 10/24/2012   SpO2 99%   BMI 41.05 kg/m²     General appearance: Seems comfortable, no acute distress. Neck: Trachea midline, thyroid normal.   Lungs:  Non labored breathing, CTA to anterior auscultation. Heart:  S1S2 normal, rub or gallop. + peripheral edema. Abdomen: Soft, non-tender, no organomegaly. Skin: No lesions or rashes, warm to touch. DATA:    CBC:   Lab Results   Component Value Date    WBC 10.9 01/23/2020    RBC 3.21 01/23/2020    HGB 9.3 01/23/2020    HCT 28.3 01/23/2020    MCV 88.1 01/23/2020    MCH 29.1 01/23/2020    MCHC 33.0 01/23/2020    RDW 16.2 01/23/2020     01/23/2020    MPV 8.3 01/23/2020     BMP:    Lab Results   Component Value Date     01/23/2020    K 4.4 01/23/2020    CL 91 01/23/2020    CO2 25 01/23/2020    BUN 46 01/23/2020    LABALBU 3.8 01/23/2020    CREATININE 1.5 01/23/2020    CALCIUM 9.5 01/23/2020    GFRAA 43 01/23/2020    LABGLOM 36 01/23/2020    GLUCOSE 131 01/23/2020       IMPRESSION/RECOMMENDATIONS:      1- A/CKD: The patient has chronic kidney disease with a baseline creatinine of 1.5 to 1.7. Her SHAUNNA is likely secondary to a pre-renal component, although a non oliguric ATN in the setting of hypotension cannot be ruled out. Her urinary output is well maintained and her serum creatinine is back to baseline, monitor. 2- Hyponatremia: Improving with daily free water restriction. 3- Hypotension: Holding all antihypertensive medications. 4- Anemia: Stable hemoglobin, monitor. 5- Chest pain: Management per cardiology and CTS.

## 2020-01-23 NOTE — PROGRESS NOTES
Inpatient Family Medicine Progress Note      PCP: Justin Rainey PA-C    Date of Admission: 1/17/2020    Chief Complaint:  Chest pain    Hospital Course: 62 y.o. female who presented to [facility] with strong chest pain that started around 0930 on 01/17. Started when she was getting ready to go to see Dr Inderjit Wagner. Felt like something sitting on her chest. Pain was 10/10, associated with shortness of breath and nausea. Took 2 Nitro at home which helped. Admitted to some pain with breathing and when walking x 2 days as well as some back pain. Has a history of previous MI, but she does not remember her previous MI. Denies any recent injuries but admits to falling last week but there was no pain at that time. Has had 3 MIs with 6 stents. Has had CardioMEMs. Was recently admitted with acute on chronic kidney disease, as well as ulcers on both feet. While here in the hospital, patient had cardiac cath that showed 4 vessel disease, vein mapping done yesterday, CABG planned for Monday. Subjective: Patient doing well. No acute events overnight. Stated she was NPO after midnight for planned URI, but was pushed back to tomorrow and thus was ordering food. Denies any fevers, chills, chest pain, SOB, nausea, vomiting, abdominal pain, diarrhea, constipation, or dysuria.      Medications:  Reviewed    Infusion Medications    sodium chloride      dextrose       Scheduled Medications    silver sulfADIAZINE   Topical Daily    sodium chloride flush  10 mL Intravenous 2 times per day    acetylcysteine  1,200 mg Oral BID    insulin glargine  5 Units Subcutaneous Nightly    buprenorphine-naloxone  1 Film Sublingual Q12H    lamoTRIgine  150 mg Oral BID    ARIPiprazole  10 mg Oral Daily    aspirin EC  81 mg Oral Daily    atorvastatin  80 mg Oral Nightly    busPIRone  30 mg Oral BID    magnesium oxide  400 mg Oral Daily    pantoprazole  40 mg Oral BID    vitamin D  50,000 Units Oral Weekly    [Held by provider] carvedilol  3.125 mg Oral BID    benztropine  1 mg Oral Daily    saccharomyces boulardii  250 mg Oral BID    sodium chloride flush  10 mL Intravenous 2 times per day    tiotropium  18 mcg Inhalation Daily    insulin lispro  0-12 Units Subcutaneous TID     insulin lispro  0-6 Units Subcutaneous Nightly    nicotine  1 patch Transdermal Daily     PRN Meds: melatonin, sodium chloride flush, acetaminophen, nitroGLYCERIN, sodium chloride flush, magnesium hydroxide, ondansetron, glucose, dextrose, glucagon (rDNA), dextrose, albuterol sulfate HFA, acetaminophen      Intake/Output Summary (Last 24 hours) at 1/20/2020 1823  Last data filed at 1/20/2020 1235  Gross per 24 hour   Intake --   Output 2375 ml   Net -2375 ml       Physical Exam Performed:    /75   Pulse 85   Temp 98.4 °F (36.9 °C) (Oral)   Resp 16   Ht 5' 4.5\" (1.638 m)   Wt 241 lb 11.2 oz (109.6 kg)   LMP 10/24/2012   SpO2 98%   BMI 40.85 kg/m²     Physical Exam  Vitals signs reviewed. Constitutional:       General: She is not in acute distress. Appearance: Normal appearance. She is obese. She is not ill-appearing. HENT:      Head: Normocephalic and atraumatic. Eyes:      Conjunctiva/sclera: Conjunctivae normal.   Cardiovascular:      Rate and Rhythm: Normal rate and regular rhythm. Heart sounds: No murmur. No friction rub. No gallop. Pulmonary:      Effort: Pulmonary effort is normal.      Breath sounds: No wheezing, rhonchi or rales. Abdominal:      General: Abdomen is flat. Bowel sounds are normal.      Palpations: Abdomen is soft. Tenderness: There is no guarding or rebound. Musculoskeletal:         General: Tenderness (lower extremeties, mild) present. Right lower leg: Edema (2+ pitting, improved from admission) present. Left lower leg: Edema (2+ pitting,  improved from admission) present. Skin:     General: Skin is warm and dry.       Findings: Erythema ( lower extremities consistent with venous stasis, improved from admission) present. Comments: Noted diabetic ulcers on both feet. Please see wound care note for images and full details. Veins marked on legs by black surgical pens. Neurological:      General: No focal deficit present. Mental Status: She is alert and oriented to person, place, and time. Psychiatric:         Mood and Affect: Mood normal.         Behavior: Behavior normal.           Labs:   Recent Labs     01/18/20  0842 01/19/20  0640 01/20/20  0748   WBC 9.4 10.7 13.0*   HGB 10.0* 10.0* 9.9*   HCT 30.2* 29.8* 30.6*    404 402     Recent Labs     01/19/20  0640 01/20/20  0209 01/20/20  0748   * 123* 125*   K 3.8 4.3 4.5   CL 85* 81* 84*   CO2 29 29 27   BUN 53* 51* 49*   CREATININE 2.0* 2.1* 1.9*   CALCIUM 9.2 9.4 9.6     Recent Labs     01/18/20  0842 01/19/20  0640 01/20/20  0748   AST 11* 9* 6*   ALT <5* 7* 6*   BILITOT 0.4 0.4 0.5   ALKPHOS 97 96 95     No results for input(s): INR in the last 72 hours. Recent Labs     01/17/20 2037   TROPONINI 0.01       Urinalysis:      Lab Results   Component Value Date    NITRU Negative 01/17/2020    WBCUA 3-5 09/28/2019    BACTERIA Rare 09/28/2019    RBCUA None seen 09/28/2019    BLOODU Negative 01/17/2020    SPECGRAV 1.010 01/17/2020    GLUCOSEU Negative 01/17/2020       Radiology:  XR CHEST STANDARD (2 VW)   Final Result   Cardiomegaly without evidence of acute cardiopulmonary process. Echo (1/22/2020)  Technically difficult examination secondary to habitus. The left ventricular systolic function is severely reduced with an ejection fraction of 25%. There is severe hypokinesis of the anterior and apical walls consistent with infarction within the territory of the left anterior descending artery. Grade II diastolic dysfunction with elevated left ventricular filling pressure. Moderate to severe mitral regurgitation. Mild tricuspid regurgitation.   Is estimated at 53 mmHg consistent with moderate Does not seem infected or gangrenous. DVT Prophylaxis: Heparin  Diet: DIET RENAL  Code Status: Full Code  PT/OT Eval Status: n/a    Dispo - Follow up with cardiothoracic surgery recommendations. Per patient, recommending inpatient until planned surgery on 1/27    This patient was seen and evaluated with resident team and attending, Dr. Gissell Jaimes.       [facility] Source of [address] [place] [address]  PGY-1  (available by [facility])

## 2020-01-23 NOTE — FLOWSHEET NOTE
01/23/20 0830   Vital Signs   Temp 98 °F (36.7 °C)   Temp Source Oral   Pulse 82   Heart Rate Source Monitor   Resp 16   /72   BP Location Left upper arm   BP Upper/Lower Upper   MAP (mmHg) 85   Patient Position Sitting   Level of Consciousness 0   MEWS Score 1   Patient Currently in Pain Yes   Pain Assessment   Pain Assessment 0-10   Pain Location Head   Oxygen Therapy   SpO2 99 %   O2 Device None (Room air)

## 2020-01-23 NOTE — PROGRESS NOTES
[facility]   Daily Progress Note    Admit Date:  1/17/2020  HPI:    Chief Complaint   Patient presents with    Chest Pain     pt sts\" i think i had a heart attack this morning, i took a nitro and it helped and it was a pain i have never felt\" pain was all through chest and back      Presented from office to ED with chest pain, improved with sl nitro but not completely resolved. Troponin's negative for ischemia. ECG unremarkable. Patient well known to me with CAD, MR, sCHF, ICM, HLD, HTN as well as PAD, DM, CLINT and smoker (recently quit). She underwent placement of CardioMEMS on 3/40/41 without complication   Recently hospitalized with BLE edema, treated for cellulitis, diuresed then developed AoCKD. Diuretics were held then restarted 3 days post discharge. Subjective:  Ms. Nancy Liao up in room. Breathing is better today. Admits to anxiety about further testing and surgery. To have URI to evaluate  Objective:   /72   Pulse 82   Temp 98 °F (36.7 °C) (Oral)   Resp 16   Ht 5' 4.5\" (1.638 m)   Wt 242 lb 14.4 oz (110.2 kg)   LMP 10/24/2012   SpO2 99%   BMI 41.05 kg/m²       Intake/Output Summary (Last 24 hours) at 1/23/2020 1011  Last data filed at 1/23/2020 0521  Gross per 24 hour   Intake 520 ml   Output 1500 ml   Net -980 ml     Wt Readings from Last 3 Encounters:   01/23/20 242 lb 14.4 oz (110.2 kg)   01/17/20 245 lb (111.1 kg)   01/16/20 251 lb (113.9 kg)         ASSESSMENT:   1. CAD - chest pain typical of angina, ECG and troponin negative for ischemia, LHC with severe LM, LAD and LCx disease - CVTS consult, holding Plavix - plan CABG on Monday (1/27/2020)  2. ICM - EF 40-45%, Coreg and Entresto held for hypotension and AoCKD, repeat echo with EF 25%  3. CHF, chronic systolic - + edema, weight stable, PAD increased from 32 (1/19/2020) to 35  (1/22/2020) by CardioMEMS, transmit daily, will likely need to recalibrate prior to discharge   4.  CKD, acute on chronic - improving with holding diuretics and BP meds, appreciate nephrology input  5. MR, moderate to severe - URI tomorrow AM for further evaluation  6. Hypotension - asymptomatic, holding Coreg and Entresto  7. Hyponatremia - improving with fluid correction  8. DM  9. PAD - needs left leg intervention per Dr. Ricardo Younger      PLAN:  1. Continue to hold Coreg, Entresto  2. URI tomorrow AM  3. CardioMEMS transmission daily  4.  Daily labs, weights, I/O      TRAY Lora - CNP, 1/23/2020, 10:11 AM  [employer]   [phone]     [id number] 1/22/20:         Telemetry: SR , isolated PVC's  NYHA: IV    Physical Exam:  General:  Awake, alert, NAD  Skin:  Warm and dry  Neck:  JVP 8 cm  Chest:  Clear to auscultation   Cardiovascular:  RRR, normal S1S2, + murmur, no g/r  Abdomen:  Softer, nontender, +bowel sounds  Extremities:  2+ BLE edema, left foot cold and purple, right foot cold and pink, unable to palpate pulses        Medications:    insulin glargine  12 Units Subcutaneous Nightly    silver sulfADIAZINE   Topical Daily    sodium chloride flush  10 mL Intravenous 2 times per day    buprenorphine-naloxone  1 Film Sublingual Q12H    lamoTRIgine  150 mg Oral BID    ARIPiprazole  10 mg Oral Daily    aspirin EC  81 mg Oral Daily    atorvastatin  80 mg Oral Nightly    busPIRone  30 mg Oral BID    magnesium oxide  400 mg Oral Daily    pantoprazole  40 mg Oral BID    vitamin D  50,000 Units Oral Weekly    benztropine  1 mg Oral Daily    saccharomyces boulardii  250 mg Oral BID    sodium chloride flush  10 mL Intravenous 2 times per day    tiotropium  18 mcg Inhalation Daily    insulin lispro  0-12 Units Subcutaneous TID WC    insulin lispro  0-6 Units Subcutaneous Nightly    nicotine  1 patch Transdermal Daily      dextrose         Lab Data: Lab results independently reviewed by myself 1/19/20   CBC:   Recent Labs     01/21/20  0511 01/22/20  0509 01/23/20  0513   WBC 10.3 9.7 10.9   HGB 9.7* 9.8* 9.3*  351 315     BMP:    Recent Labs     01/21/20  0511 01/22/20  0509 01/23/20  0513   * 127* 129*   K 4.3  4.3 4.4 4.4   CO2 27 26 25   BUN 44* 43* 46*   CREATININE 1.8* 1.8* 1.5*     INR:  No results for input(s): INR in the last 72 hours. BNP:    No results for input(s): PROBNP in the last 72 hours. Cardiac Enzymes:   No results for input(s): TROPONINI in the last 72 hours. Lipids:   Lab Results   Component Value Date    TRIG 140 01/11/2020    TRIG 278 10/21/2019    HDL 27 01/11/2020    HDL 31 10/21/2019    LDLCALC 103 01/11/2020    LDLCALC 91 10/21/2019    LDLDIRECT 187 08/01/2017       Cardiac Imaging:    ECHO 1/22/2020:    Technically difficult examination secondary to habitus. The left ventricular systolic function is severely reduced with an ejection fraction of 25 %. There is severe hypokinesis of the anterior and apical walls consistent with infarction within the territory of the left anterior descending artery. Grade II diastolic dysfunction with elevated left ventricular filling pressure. Moderate to severe mitral regurgitation. Mild tricuspid regurgitation. RVSP is estimated at 53 mmHg consistent with moderate pulmonary hypertension (Right atrial pressure of 3 mmHg). If clinically necessary, consider URI for better evaluation of mitral regurgitation.     CARDIAC CATH 1/20/2020:   Left Heart Cath  Dominance : Right     LM: 70-80% short distal stenosis     LAD: 70-80% short ostial stenosis, extending into takeoff of high first diagonal; large proximal to mid stented segment patent with jailed second diagonal  (80% ostial D2) and 70% in stent restenosis of most distal stent; distal to near apical LAD with minimal disease     LCx: 70-80% short ostial stenosis, prior to patent proximal to mid stents      RCA: large, dominant vessel with long 60% proximal to mid stenosis      LVEDP: 4 mmHG  LVG not done due to CKD.     Impression/Recommendations:  Diabetic with previous LAD and LCx stenting in 2018 returns with unstable angina, in stent restenosis and Left Main bifurcation disease. Recommend CTS evaluation inpatient to discuss surgical revascularization option. Hold Clopidogrel. BLE ZACH's 4/18/19:   1. There is severe arterial insufficiency at rest involving the right lower    extremity. There occlusive disease of the right proximal superficial femoral    (noted stent) and mid posterior tibial arteries. There is a 50-74% stenosis    at 4the right deep femoral artery with evidence of inflow disease.    2. There is severe arterial insufficiency at rest involving the left lower    extremity. There is occlusive disease of the left proximal superficial    femoral artery (noted stent). There is a 30-49% stenosis at the left deep    femoral artery with evidence of inflow disease.         ECHO 4/3/19:   [facility] systolic function is mildly reduced with EF estimated at 40-45%.   There is severe HK of the apex and apical-septal segment.   Normal left ventricular diastolic filling pressure.   Mild mitral regurgitation.   Systolic pulmonary artery pressure (SPAP) estimated at 32mmHg (RA pressure 3mmHg). Arterial doppler studies 8/2/18:   Milon Perch is no arterial insufficiency at rest involving the lower extremities    bilaterally, however, there is evidence to suggest calf vessel disease    bilaterally. [facility] 3/26/18 [employer] promus premier [id number] CCx and [id number] CCx. Echo: 5/23/18:    Ejection fraction is visually estimated to be 30-35 %. There is akinesis of the apex and inferior walls. Left ventricular size is mildly increased. Moderate mitral regurgitation. Moderate amount plaque is noted in the aorta. The left atrium is mildly dilated. 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 A bubble study was performed and fails to show evidence of shunting.     Procedure performed: Transesophageal echocardiogram 5/25/18:    Findings:  Reduced left

## 2020-01-23 NOTE — FLOWSHEET NOTE
Sensation RLE Decreased  (decerased @ times d/t neuropathy)   R Post Tibial Pulse +1   R Pedal Pulse +1   R Calf Tenderness  Negative   LLE Neurovascular Assessment   Capillary Refill Less than/equal to 3 seconds   Color Red   Temperature Warm   Sensation LLE Decreased  (decerased @ times d/t neuropathy)   L Post Tibial Pulse +1   L Pedal Pulse +1   L Calf Tenderness Negative   Puncture Site Assessment 1   Location Radial - right   Site Assessment No redness, drainage, swelling or hematoma   Hemostasis Intervention Discontinued   Dressing Applied Transparent occlusive dressing   Skin Color/Condition   Skin Color/Condition (WDL) X  (redness BLE)   Skin Color Appropriate for ethnicity; Red  (Red BLE)   Skin Condition/Temp Dry;Warm;Swollen   Skin Integrity   Skin Integrity (WDL) X   Skin Integrity Other (Comment)  (Diabetic foot ulcers)   Location   (Right foot)   Preventative Dressing No   Multiple Skin Integrity Sites Yes   Skin Fold Management No   Skin Integrity Site 2   Skin Integrity Location 2 Bruising   Location 2 Scattered   Preventative Dressing No   Assessed this shift? Yes   Skin Integrity Site 3   Skin Integrity Location 3 Redness    Location 3 BLE   Preventative Dressing No   Assessed this shift? Yes   Skin Integrity Site 4   Skin Integrity Location 4 Other (Comment)  (Diabetic foot ulcers)   Location 4 Right foot   Preventative Dressing No   Assessed this shift? Yes   Skin Integrity Site 5   Skin Integrity Location 5 Other (Comment)  (Scabs)   Location 5   (scattered; BLE)   Preventative Dressing No   Assessed this shift?  Yes   Musculoskeletal   Musculoskeletal (WDL) WDL   RUE Full movement   LUE Full movement   RL Extremity Full movement   LL Extremity Full movement   Genitourinary   Genitourinary (WDL) WDL   Flank Tenderness No   Suprapubic Tenderness No   Dysuria No   Urine Assessment   Incontinence No   Urine Color Yellow/straw   Urine Appearance Clear   Urine Odor No odor   Anus/Rectum Anus/Rectum (WDL) WDL   Wound 01/17/20 Toe (Comment  which one) Anterior; Left Closed brown plantar 3rd toe   Date First Assessed/Time First Assessed: 01/17/20 1745   Present on Hospital Admission: Yes  Primary Wound Type: Diabetic Ulcer  Location: (c) Toe (Comment  which one)  Wound Location Orientation: Anterior; Left  Wound Description (Comments): Closed br. .. Dressing Status Other (Comment)  (none in place; pt refused)   Dressing/Treatment Open to air   Wound Assessment Clean;Dry   Drainage Amount None   Odor None   Shaye-wound Assessment Clean;Dry; Intact   Wound 01/20/20 Toe (Comment  which one) Anterior; Left 1st dorsal toe lateral edge of nail, brown & dry   Date First Assessed/Time First Assessed: 01/20/20 0958   Present on Hospital Admission: Yes  Primary Wound Type: Diabetic Ulcer  Location: Toe (Comment  which one)  Wound Location Orientation: Anterior; Left  Wound Description (Comments): 1st dorsal to. .. Dressing Status Other (Comment)  (Pt refused)   Dressing/Treatment Open to air   Wound Assessment Dry; Intact; Clean   Drainage Amount None   Odor None   Shaye-wound Assessment Clean;Dry; Intact   Wound 01/20/20 Toe (Comment  which one) Anterior; Left raised clear filled bulla 2nd inner dorsal toe   Date First Assessed/Time First Assessed: 01/20/20 1002   Present on Hospital Admission: Yes  Primary Wound Type: Pressure Injury  Location: Toe (Comment  which one)  Wound Location Orientation: Anterior; Left  Wound Description (Comments): raised clear. .. Dressing Status   (pt refused)   Dressing/Treatment Open to air   Odor None   Shaye-wound Assessment Clean;Dry; Intact   Wound 01/20/20 Foot Right;Plantar;Lateral brown, dry, closed   Date First Assessed: 01/20/20   Present on Hospital Admission: Yes  Primary Wound Type: Diabetic Ulcer  Location: Foot  Wound Location Orientation: Right;Plantar;Lateral  Wound Description (Comments): brown, dry, closed   Dressing Status Other (Comment)  (pt refused dressing) Dressing/Treatment Open to air   Wound Assessment Dry;Clean   Drainage Amount None   Odor None   Psychosocial   Psychosocial (WDL) WDL   Needs Expressed Physical   Patient Behaviors Appropriate for age; Cooperative

## 2020-01-24 ENCOUNTER — ANESTHESIA (OUTPATIENT)
Dept: CARDIAC CATH/INVASIVE PROCEDURES | Age: 55
DRG: 166 | End: 2020-01-24
Payer: COMMERCIAL

## 2020-01-24 ENCOUNTER — ANESTHESIA EVENT (OUTPATIENT)
Dept: CARDIAC CATH/INVASIVE PROCEDURES | Age: 55
DRG: 166 | End: 2020-01-24
Payer: COMMERCIAL

## 2020-01-24 ENCOUNTER — APPOINTMENT (OUTPATIENT)
Dept: CARDIAC CATH/INVASIVE PROCEDURES | Age: 55
DRG: 166 | End: 2020-01-24
Payer: COMMERCIAL

## 2020-01-24 ENCOUNTER — ANESTHESIA EVENT (OUTPATIENT)
Dept: OPERATING ROOM | Age: 55
DRG: 166 | End: 2020-01-24
Payer: COMMERCIAL

## 2020-01-24 VITALS — DIASTOLIC BLOOD PRESSURE: 57 MMHG | SYSTOLIC BLOOD PRESSURE: 113 MMHG | OXYGEN SATURATION: 98 %

## 2020-01-24 LAB
A/G RATIO: 1.1 (ref 1.1–2.2)
ALBUMIN SERPL-MCNC: 3.8 G/DL (ref 3.4–5)
ALP BLD-CCNC: 93 U/L (ref 40–129)
ALT SERPL-CCNC: 6 U/L (ref 10–40)
ANION GAP SERPL CALCULATED.3IONS-SCNC: 12 MMOL/L (ref 3–16)
AST SERPL-CCNC: 8 U/L (ref 15–37)
BASOPHILS ABSOLUTE: 0.1 K/UL (ref 0–0.2)
BASOPHILS RELATIVE PERCENT: 0.5 %
BILIRUB SERPL-MCNC: 0.4 MG/DL (ref 0–1)
BUN BLDV-MCNC: 46 MG/DL (ref 7–20)
CALCIUM SERPL-MCNC: 9.2 MG/DL (ref 8.3–10.6)
CHLORIDE BLD-SCNC: 94 MMOL/L (ref 99–110)
CO2: 24 MMOL/L (ref 21–32)
CREAT SERPL-MCNC: 1.7 MG/DL (ref 0.6–1.1)
EOSINOPHILS ABSOLUTE: 0.2 K/UL (ref 0–0.6)
EOSINOPHILS RELATIVE PERCENT: 1.8 %
GFR AFRICAN AMERICAN: 38
GFR NON-AFRICAN AMERICAN: 31
GLOBULIN: 3.6 G/DL
GLUCOSE BLD-MCNC: 152 MG/DL (ref 70–99)
GLUCOSE BLD-MCNC: 158 MG/DL (ref 70–99)
GLUCOSE BLD-MCNC: 162 MG/DL (ref 70–99)
GLUCOSE BLD-MCNC: 172 MG/DL (ref 70–99)
GLUCOSE BLD-MCNC: 220 MG/DL (ref 70–99)
HCT VFR BLD CALC: 28.6 % (ref 36–48)
HEMOGLOBIN: 9.4 G/DL (ref 12–16)
LV EF: 38 %
LVEF MODALITY: NORMAL
LYMPHOCYTES ABSOLUTE: 0.9 K/UL (ref 1–5.1)
LYMPHOCYTES RELATIVE PERCENT: 7.5 %
MCH RBC QN AUTO: 28.8 PG (ref 26–34)
MCHC RBC AUTO-ENTMCNC: 32.8 G/DL (ref 31–36)
MCV RBC AUTO: 87.7 FL (ref 80–100)
MONOCYTES ABSOLUTE: 0.7 K/UL (ref 0–1.3)
MONOCYTES RELATIVE PERCENT: 5.9 %
NEUTROPHILS ABSOLUTE: 9.8 K/UL (ref 1.7–7.7)
NEUTROPHILS RELATIVE PERCENT: 84.3 %
PDW BLD-RTO: 16.2 % (ref 12.4–15.4)
PERFORMED ON: ABNORMAL
PLATELET # BLD: 333 K/UL (ref 135–450)
PMV BLD AUTO: 8.4 FL (ref 5–10.5)
POTASSIUM REFLEX MAGNESIUM: 4.4 MMOL/L (ref 3.5–5.1)
RBC # BLD: 3.26 M/UL (ref 4–5.2)
SODIUM BLD-SCNC: 130 MMOL/L (ref 136–145)
TOTAL PROTEIN: 7.4 G/DL (ref 6.4–8.2)
WBC # BLD: 11.7 K/UL (ref 4–11)

## 2020-01-24 PROCEDURE — 6370000000 HC RX 637 (ALT 250 FOR IP): Performed by: STUDENT IN AN ORGANIZED HEALTH CARE EDUCATION/TRAINING PROGRAM

## 2020-01-24 PROCEDURE — 93325 DOPPLER ECHO COLOR FLOW MAPG: CPT

## 2020-01-24 PROCEDURE — 6360000002 HC RX W HCPCS: Performed by: NURSE ANESTHETIST, CERTIFIED REGISTERED

## 2020-01-24 PROCEDURE — 36415 COLL VENOUS BLD VENIPUNCTURE: CPT

## 2020-01-24 PROCEDURE — 93315 ECHO TRANSESOPHAGEAL: CPT

## 2020-01-24 PROCEDURE — 2580000003 HC RX 258: Performed by: INTERNAL MEDICINE

## 2020-01-24 PROCEDURE — 93320 DOPPLER ECHO COMPLETE: CPT

## 2020-01-24 PROCEDURE — 2580000003 HC RX 258: Performed by: NURSE ANESTHETIST, CERTIFIED REGISTERED

## 2020-01-24 PROCEDURE — 2500000003 HC RX 250 WO HCPCS: Performed by: NURSE ANESTHETIST, CERTIFIED REGISTERED

## 2020-01-24 PROCEDURE — 80053 COMPREHEN METABOLIC PANEL: CPT

## 2020-01-24 PROCEDURE — 1200000000 HC SEMI PRIVATE

## 2020-01-24 PROCEDURE — 85025 COMPLETE CBC W/AUTO DIFF WBC: CPT

## 2020-01-24 PROCEDURE — 3700000000 HC ANESTHESIA ATTENDED CARE

## 2020-01-24 PROCEDURE — 2580000003 HC RX 258

## 2020-01-24 PROCEDURE — 2580000003 HC RX 258: Performed by: STUDENT IN AN ORGANIZED HEALTH CARE EDUCATION/TRAINING PROGRAM

## 2020-01-24 PROCEDURE — 3700000001 HC ADD 15 MINUTES (ANESTHESIA)

## 2020-01-24 RX ORDER — MEPERIDINE HYDROCHLORIDE 50 MG/ML
12.5 INJECTION INTRAMUSCULAR; INTRAVENOUS; SUBCUTANEOUS EVERY 5 MIN PRN
Status: DISCONTINUED | OUTPATIENT
Start: 2020-01-24 | End: 2020-01-27

## 2020-01-24 RX ORDER — PROMETHAZINE HYDROCHLORIDE 25 MG/ML
6.25 INJECTION, SOLUTION INTRAMUSCULAR; INTRAVENOUS
Status: ACTIVE | OUTPATIENT
Start: 2020-01-24 | End: 2020-01-24

## 2020-01-24 RX ORDER — PROPOFOL 10 MG/ML
INJECTION, EMULSION INTRAVENOUS PRN
Status: DISCONTINUED | OUTPATIENT
Start: 2020-01-24 | End: 2020-01-24 | Stop reason: SDUPTHER

## 2020-01-24 RX ORDER — OXYCODONE HYDROCHLORIDE AND ACETAMINOPHEN 5; 325 MG/1; MG/1
1 TABLET ORAL PRN
Status: ACTIVE | OUTPATIENT
Start: 2020-01-24 | End: 2020-01-24

## 2020-01-24 RX ORDER — ASPIRIN 81 MG/1
81 TABLET ORAL ONCE
Status: COMPLETED | OUTPATIENT
Start: 2020-01-27 | End: 2020-01-27

## 2020-01-24 RX ORDER — MORPHINE SULFATE 4 MG/ML
2 INJECTION, SOLUTION INTRAMUSCULAR; INTRAVENOUS EVERY 5 MIN PRN
Status: DISCONTINUED | OUTPATIENT
Start: 2020-01-24 | End: 2020-01-27

## 2020-01-24 RX ORDER — OXYCODONE HYDROCHLORIDE AND ACETAMINOPHEN 5; 325 MG/1; MG/1
2 TABLET ORAL PRN
Status: ACTIVE | OUTPATIENT
Start: 2020-01-24 | End: 2020-01-24

## 2020-01-24 RX ORDER — OXYCODONE HYDROCHLORIDE 5 MG/1
5 TABLET ORAL ONCE
Status: COMPLETED | OUTPATIENT
Start: 2020-01-24 | End: 2020-01-24

## 2020-01-24 RX ORDER — DIPHENHYDRAMINE HYDROCHLORIDE 50 MG/ML
12.5 INJECTION INTRAMUSCULAR; INTRAVENOUS
Status: ACTIVE | OUTPATIENT
Start: 2020-01-24 | End: 2020-01-24

## 2020-01-24 RX ORDER — SODIUM CHLORIDE 9 MG/ML
INJECTION, SOLUTION INTRAVENOUS CONTINUOUS PRN
Status: DISCONTINUED | OUTPATIENT
Start: 2020-01-24 | End: 2020-01-24 | Stop reason: SDUPTHER

## 2020-01-24 RX ORDER — SODIUM CHLORIDE 9 MG/ML
INJECTION, SOLUTION INTRAVENOUS CONTINUOUS
Status: DISCONTINUED | OUTPATIENT
Start: 2020-01-27 | End: 2020-01-27

## 2020-01-24 RX ORDER — ONDANSETRON 2 MG/ML
4 INJECTION INTRAMUSCULAR; INTRAVENOUS
Status: ACTIVE | OUTPATIENT
Start: 2020-01-24 | End: 2020-01-24

## 2020-01-24 RX ORDER — LIDOCAINE HYDROCHLORIDE 20 MG/ML
INJECTION, SOLUTION INFILTRATION; PERINEURAL PRN
Status: DISCONTINUED | OUTPATIENT
Start: 2020-01-24 | End: 2020-01-24 | Stop reason: SDUPTHER

## 2020-01-24 RX ORDER — MORPHINE SULFATE 4 MG/ML
1 INJECTION, SOLUTION INTRAMUSCULAR; INTRAVENOUS EVERY 5 MIN PRN
Status: DISCONTINUED | OUTPATIENT
Start: 2020-01-24 | End: 2020-01-27

## 2020-01-24 RX ORDER — HYDRALAZINE HYDROCHLORIDE 20 MG/ML
5 INJECTION INTRAMUSCULAR; INTRAVENOUS EVERY 10 MIN PRN
Status: DISCONTINUED | OUTPATIENT
Start: 2020-01-24 | End: 2020-01-27

## 2020-01-24 RX ORDER — LABETALOL HYDROCHLORIDE 5 MG/ML
5 INJECTION, SOLUTION INTRAVENOUS EVERY 10 MIN PRN
Status: DISCONTINUED | OUTPATIENT
Start: 2020-01-24 | End: 2020-01-27

## 2020-01-24 RX ORDER — BISACODYL 10 MG
10 SUPPOSITORY, RECTAL RECTAL ONCE
Status: DISCONTINUED | OUTPATIENT
Start: 2020-01-26 | End: 2020-01-27

## 2020-01-24 RX ADMIN — Medication 10 ML: at 20:26

## 2020-01-24 RX ADMIN — ATORVASTATIN CALCIUM 80 MG: 80 TABLET, FILM COATED ORAL at 20:21

## 2020-01-24 RX ADMIN — Medication 250 MG: at 11:32

## 2020-01-24 RX ADMIN — LAMOTRIGINE 150 MG: 100 TABLET ORAL at 11:32

## 2020-01-24 RX ADMIN — ACETAMINOPHEN 650 MG: 325 TABLET ORAL at 22:23

## 2020-01-24 RX ADMIN — INSULIN LISPRO 2 UNITS: 100 INJECTION, SOLUTION INTRAVENOUS; SUBCUTANEOUS at 11:39

## 2020-01-24 RX ADMIN — Medication 10 ML: at 11:33

## 2020-01-24 RX ADMIN — PROPOFOL 30 MG: 10 INJECTION, EMULSION INTRAVENOUS at 09:06

## 2020-01-24 RX ADMIN — MELATONIN TAB 5 MG 5 MG: 5 TAB at 22:23

## 2020-01-24 RX ADMIN — BENZTROPINE MESYLATE 1 MG: 1 TABLET ORAL at 11:32

## 2020-01-24 RX ADMIN — LIDOCAINE HYDROCHLORIDE 60 MG: 20 INJECTION, SOLUTION INFILTRATION; PERINEURAL at 08:50

## 2020-01-24 RX ADMIN — BUPRENORPHINE AND NALOXONE 1 FILM: 8; 2 FILM, SOLUBLE BUCCAL; SUBLINGUAL at 11:31

## 2020-01-24 RX ADMIN — LAMOTRIGINE 150 MG: 100 TABLET ORAL at 20:21

## 2020-01-24 RX ADMIN — PANTOPRAZOLE SODIUM 40 MG: 40 TABLET, DELAYED RELEASE ORAL at 11:31

## 2020-01-24 RX ADMIN — INSULIN LISPRO 1 UNITS: 100 INJECTION, SOLUTION INTRAVENOUS; SUBCUTANEOUS at 20:21

## 2020-01-24 RX ADMIN — PROPOFOL 30 MG: 10 INJECTION, EMULSION INTRAVENOUS at 08:53

## 2020-01-24 RX ADMIN — PANTOPRAZOLE SODIUM 40 MG: 40 TABLET, DELAYED RELEASE ORAL at 18:01

## 2020-01-24 RX ADMIN — ARIPIPRAZOLE 10 MG: 10 TABLET ORAL at 11:31

## 2020-01-24 RX ADMIN — PROPOFOL 50 MG: 10 INJECTION, EMULSION INTRAVENOUS at 08:52

## 2020-01-24 RX ADMIN — BUSPIRONE HYDROCHLORIDE 30 MG: 5 TABLET ORAL at 11:31

## 2020-01-24 RX ADMIN — BUPRENORPHINE AND NALOXONE 1 FILM: 8; 2 FILM, SOLUBLE BUCCAL; SUBLINGUAL at 20:21

## 2020-01-24 RX ADMIN — INSULIN LISPRO 4 UNITS: 100 INJECTION, SOLUTION INTRAVENOUS; SUBCUTANEOUS at 18:01

## 2020-01-24 RX ADMIN — Medication 250 MG: at 20:21

## 2020-01-24 RX ADMIN — BUSPIRONE HYDROCHLORIDE 30 MG: 5 TABLET ORAL at 20:21

## 2020-01-24 RX ADMIN — OXYCODONE 5 MG: 5 TABLET ORAL at 23:33

## 2020-01-24 RX ADMIN — SODIUM CHLORIDE: 9 INJECTION, SOLUTION INTRAVENOUS at 08:04

## 2020-01-24 RX ADMIN — PROPOFOL 30 MG: 10 INJECTION, EMULSION INTRAVENOUS at 08:57

## 2020-01-24 RX ADMIN — ASPIRIN 81 MG: 81 TABLET, COATED ORAL at 11:32

## 2020-01-24 RX ADMIN — INSULIN GLARGINE 15 UNITS: 100 INJECTION, SOLUTION SUBCUTANEOUS at 20:20

## 2020-01-24 RX ADMIN — MAGNESIUM OXIDE TAB 400 MG (241.3 MG ELEMENTAL MG) 400 MG: 400 (241.3 MG) TAB at 11:31

## 2020-01-24 ASSESSMENT — PAIN SCALES - GENERAL
PAINLEVEL_OUTOF10: 7
PAINLEVEL_OUTOF10: 0
PAINLEVEL_OUTOF10: 7
PAINLEVEL_OUTOF10: 0
PAINLEVEL_OUTOF10: 0
PAINLEVEL_OUTOF10: 7
PAINLEVEL_OUTOF10: 0

## 2020-01-24 ASSESSMENT — PAIN DESCRIPTION - PAIN TYPE: TYPE: ACUTE PAIN

## 2020-01-24 ASSESSMENT — PAIN SCALES - WONG BAKER
WONGBAKER_NUMERICALRESPONSE: 0
WONGBAKER_NUMERICALRESPONSE: 0

## 2020-01-24 ASSESSMENT — PAIN DESCRIPTION - LOCATION: LOCATION: BACK

## 2020-01-24 NOTE — PROGRESS NOTES
Oral Daily    saccharomyces boulardii  250 mg Oral BID    sodium chloride flush  10 mL Intravenous 2 times per day    tiotropium  18 mcg Inhalation Daily    insulin lispro  0-12 Units Subcutaneous TID WC    insulin lispro  0-6 Units Subcutaneous Nightly    nicotine  1 patch Transdermal Daily     PRN Meds: melatonin, sodium chloride flush, acetaminophen, nitroGLYCERIN, sodium chloride flush, magnesium hydroxide, ondansetron, glucose, dextrose, glucagon (rDNA), dextrose, albuterol sulfate HFA, acetaminophen      Intake/Output Summary (Last 24 hours) at 1/20/2020 1823  Last data filed at 1/20/2020 1235  Gross per 24 hour   Intake --   Output 2375 ml   Net -2375 ml       Physical Exam Performed:    /75   Pulse 85   Temp 98.4 °F (36.9 °C) (Oral)   Resp 16   Ht 5' 4.5\" (1.638 m)   Wt 241 lb 11.2 oz (109.6 kg)   LMP 10/24/2012   SpO2 98%   BMI 40.85 kg/m²     Physical Exam  Vitals signs reviewed. Constitutional:       General: She is not in acute distress. Appearance: Normal appearance. She is obese. She is not ill-appearing. HENT:      Head: Normocephalic and atraumatic. Eyes:      Conjunctiva/sclera: Conjunctivae normal.   Cardiovascular:      Rate and Rhythm: Normal rate and regular rhythm. Heart sounds: No murmur. No friction rub. No gallop. Pulmonary:      Effort: Pulmonary effort is normal.      Breath sounds: No wheezing, rhonchi or rales. Abdominal:      General: Abdomen is flat. Bowel sounds are normal.      Palpations: Abdomen is soft. Tenderness: There is no guarding or rebound. Musculoskeletal:         General: Tenderness (lower extremeties, mild) present. Right lower leg: Edema (2+ pitting, improved from admission) present. Left lower leg: Edema (2+ pitting,  improved from admission) present. Skin:     General: Skin is warm and dry. Findings: Erythema ( lower extremities consistent with venous stasis, improved from admission) present.

## 2020-01-24 NOTE — PROGRESS NOTES
Nutrition Supplement (ONS) Orders: None  · ONS intake: Unable to assess  · Anthropometric Measures:  · Ht: 5' 4.5\" (163.8 cm)   · Current Body Wt: 246 lb 1 oz (111.6 kg)  · Ideal Body Wt: 128 lb (58.1 kg), % Ideal Body    · BMI Classification: BMI > or equal to 40.0 Obese Class III    Nutrition Interventions:   (advance as tolerated; may need nutrition supplements in the near future to enhance protein)  Continued Inpatient Monitoring    Nutrition Evaluation:   · Evaluation: Goals set   · Goals: Patient will eat 50% or greater of meals and supplements.       · Monitoring: Supplement Intake, Meal Intake, Pertinent Labs, Weight, Nutrition Progression      Electronically signed by Crisoforo Dancer, RD, JING on 1/24/20 at 9:50 AM    Contact Number: Office: [phone]; [address] [place] [address] Road: [phone]

## 2020-01-24 NOTE — PROGRESS NOTES
Brief Pre-Op Note/Sedation Assessment      Cece Bautista  1965  Cath Pool Rm/NONE      [id number]  8:46 AM    Planned Procedure: URI Procedure    Post Procedure Plan: Return to same level of care    Consent: I have discussed with the patient and/or the patient representative the indication, alternatives, and the possible risks and/or complications of the planned procedure and the anesthesia methods. The patient and/or patient representative appear to understand and agree to proceed. Chief Complaint: Dyspnea      Indications for Cath Procedure:  CAD, assess MR      Vital Signs:  /78   Pulse 81   Temp 98.2 °F (36.8 °C) (Oral)   Resp 16   Ht 5' 4.5\" (1.638 m)   Wt 246 lb 1.6 oz (111.6 kg)   LMP 10/24/2012   SpO2 97%   BMI 41.59 kg/m²     Allergies:   Allergies   Allergen Reactions    Lisinopril Other (See Comments)     Severe hypotension       Past Medical History:  Past Medical History:   Diagnosis Date    Anxiety and depression     CAD (coronary artery disease) 01/2018    Cardiomyopathy ([address]     CHF [facility]congestive heart failure) ([facility])     COPD (chronic obstructive pulmonary disease) (HCC)     Diabetes mellitus (HCC)     GERD (gastroesophageal reflux disease)     Hyperlipidemia     Hypertension     Hypotension     MI (myocardial infarction) ([address]     Peripheral neuropathy     Peripheral vascular disease ([address]     Pulmonary HTN ([address]     Reflux     Sleep apnea     has never done sleep study;does not use CPAP    Wears glasses     for reading         Surgical History:  Past Surgical History:   Procedure Laterality Date    ARTERIAL BYPASS SURGRY Left 01/06/2016    Axillary/Subclavian to Brachial Bypass w/reversed SVG    CARDIAC CATHETERIZATION  03/27/2018    [facility] [address]      pancreatitis post surgery    CORONARY ANGIOPLASTY WITH STENT PLACEMENT  03/16/2018    MELODY- 3.5 x 38 and 3.5 x 20 to Cx    CORONARY ANGIOPLASTY 1 mg at 01/23/20 6569    saccharomyces boulardii (FLORASTOR) capsule 250 mg  250 mg Oral BID Milad Furbish, DO   250 mg at 01/23/20 2155    nitroGLYCERIN (NITROSTAT) SL tablet 0.4 mg  0.4 mg Sublingual Q5 Min PRN Milad Furbish, DO        sodium chloride flush 0.9 % injection 10 mL  10 mL Intravenous 2 times per day Milad Furbish, DO   10 mL at 01/23/20 2204    sodium chloride flush 0.9 % injection 10 mL  10 mL Intravenous PRN Milad Furbish, DO        magnesium hydroxide (MILK OF MAGNESIA) 400 MG/5ML suspension 30 mL  30 mL Oral Daily PRN Milad Furbish, DO        ondansetron [facility]) injection 4 mg  4 mg Intravenous Q6H PRN Milad Furbish, DO        tiotropium [facility]) inhalation capsule 18 mcg  18 mcg Inhalation Daily Milad Furbish, DO   18 mcg at 01/23/20 0732    glucose (GLUTOSE) 40 % oral gel 15 g  15 g Oral PRN Bula Reason, DO        dextrose 50 % IV solution  12.5 g Intravenous PRN Bula Reason, DO        glucagon (rDNA) injection 1 mg  1 mg Intramuscular PRN Bula Reason, DO        dextrose 5 % solution  100 mL/hr Intravenous PRN Bula Reason, DO        insulin lispro (HUMALOG) injection vial 0-12 Units  0-12 Units Subcutaneous TID WC Bula Reason, DO   2 Units at 01/23/20 1720    insulin lispro (HUMALOG) injection vial 0-6 Units  0-6 Units Subcutaneous Nightly Bula Reason, DO   1 Units at 01/23/20 2158    albuterol sulfate  (90 Base) MCG/ACT inhaler 2 puff  2 puff Inhalation Q4H PRN Norvel Maha, DO        acetaminophen (TYLENOL) tablet 650 mg  650 mg Oral Q4H PRN Dwayne Joana, DO   650 mg at 01/23/20 2155    nicotine (NICODERM CQ) 14 MG/24HR 1 patch  1 patch Transdermal Daily Dwayne Joana, DO   1 patch at 01/22/20 1017     Facility-Administered Medications Ordered in Other Encounters   Medication Dose Route Frequency Provider Last Rate Last Dose    0.9 % sodium chloride infusion    Continuous PRN Beverly No, APRN - CRNA               Pre-Sedation:    Pre-Sedation Documentation and Exam:  I have assessed the patient and agree with the H&P present on the chart. Prior History of Anesthesia Complications:   none    Modified Mallampati:  II (soft palate, uvula, fauces visible)    ASA Classification:  Class 3 - A patient with severe systemic disease that limits activity but is not incapacitating      Scarlett Scale: Activity:  2 - Able to move 4 extremities voluntarily on command  Respiration:  2 - Able to breathe deeply and cough freely  Circulation:  2 - BP+/- 20mmHg of normal  Consciousness:  2 - Fully awake  Oxygen Saturation (color):  2 - Able to maintain oxygen saturation >92% on room air    Sedation/Anesthesia Plan:  Guard the patient's safety and welfare. Minimize physical discomfort and pain. Minimize negative psychological responses to treatment by providing sedation and analgesia and maximize the potential amnesia. Patient to meet pre-procedure discharge plan. Medication Planned:  As per anesthesiology service.         Electronically signed by Christene Soulier, MD on 1/24/2020 at 8:46 AM

## 2020-01-24 NOTE — PROGRESS NOTES
CVTS Thoracic Progress Note:          CC: Multivessel CAD    Subj: awake, sitting up in bed, in NAD    Obj:    Blood pressure 99/67, pulse 85, temperature 98.5 °F (36.9 °C), temperature source Oral, resp. rate 16, height 5' 4.5\" (1.638 m), weight 246 lb 1.6 oz (111.6 kg), last menstrual period 10/24/2012, SpO2 97 %, not currently breastfeeding. Lungs diminished in bases   Abdomen round, soft, non-tender   /900/600= 2150 ml in 24 hrs; Cr 1.7    Diagnostics:   Recent Labs     01/22/20  0509 01/23/20  0513 01/24/20  0510   WBC 9.7 10.9 11.7*   HGB 9.8* 9.3* 9.4*   HCT 29.8* 28.3* 28.6*    315 333                                                                  Recent Labs     01/22/20  0509 01/23/20  0513 01/24/20  0510   * 129* 130*   K 4.4 4.4 4.4   CL 89* 91* 94*   CO2 26 25 24   BUN 43* 46* 46*   CREATININE 1.8* 1.5* 1.7*   GLUCOSE 129* 131* 152*     CXR: 1/17/20   Impression   Cardiomegaly without evidence of acute cardiopulmonary process. Assess/Plan:     Multivessel CAD:  Continue pre-op testing. Pt to have a URI today. CABG scheduled for Monday. NPO Sunday night.  ________________________________________________________________    Charmayne Billing, CNP  1/24/2020  12:23 PM    __________________________________________________________________    Note reviewed, events of last 24 hours reviewed along with vital signs and I/Os and patient examined. Images reviewed.   Assessment & Plans  No cardiac complaints, doing well, sitting comfortable in bed  Plan for surgery on Monday  All questions answered    Kat Smiley MD  Cardiothoracic Surgery

## 2020-01-24 NOTE — ANESTHESIA POSTPROCEDURE EVALUATION
Department of Anesthesiology  Postprocedure Note    Patient: Doroteo Fuentes  MRN: [id number]  YOB: 1965  Date of evaluation: 1/24/2020  Time:  10:53 AM     Procedure Summary     Date:  01/24/20 Room / Location:  [facility] Cardiac Cath Lab    Anesthesia Start:  91 11 64 Anesthesia Stop:  5052    Procedure:  TRANSESOPHAGEAL ECHO Diagnosis:      Scheduled Providers:   Responsible Provider:  Crystal Johnson MD    Anesthesia Type:  general ASA Status:  3          Anesthesia Type: general    Scarlett Phase I:      Scarlett Phase II:      Last vitals: Reviewed and per EMR flowsheets. Anesthesia Post Evaluation    Patient location during evaluation: PACU  Patient participation: complete - patient participated  Level of consciousness: awake and alert  Airway patency: patent  Nausea & Vomiting: no nausea and no vomiting  Complications: no  Cardiovascular status: blood pressure returned to baseline  Respiratory status: acceptable  Hydration status: euvolemic  Comments: VSS on transfer to phase 2 recovery. No anesthetic complications.

## 2020-01-24 NOTE — ANESTHESIA PRE PROCEDURE
Department of Anesthesiology  Preprocedure Note       Name:  Babatunde Rea   Age:  62 y.o.  :  1965                                          MRN:  [id number]         Date:  2020      Surgeon: Britt Malloy):  Meri Page MD    Procedure: TRANSESOPHAGEAL ECHO    Medications prior to admission:   Prior to Admission medications    Medication Sig Start Date End Date Taking? Authorizing Provider   torsemide (DEMADEX) 100 MG tablet Take 1 tablet by mouth 2 times daily 20  Yes TRAY Ambriz CNP   spironolactone (ALDACTONE) 100 MG tablet Take 1 tablet by mouth 2 times daily 20  Yes TRAY Ambriz CNP   metOLazone (ZAROXOLYN) 2.5 MG tablet Take 1 tablet by mouth daily 20  Yes TRAY Ambriz CNP   nystatin (MYCOSTATIN) 873679 UNIT/GM cream  19  Yes Historical Provider, MD   benztropine (COGENTIN) 1 MG tablet  19  Yes Historical Provider, MD   blood glucose test strips (EXACTECH TEST) strip 6 times daily.  19  Yes TRAY Macias CNP   Insulin Pen Needle (B-D ULTRAFINE III SHORT PEN) 31G X 8 MM MISC Five shots a day 12/3/19  Yes TRAY Macias CNP   INSULIN SYRINGE .5CC/29G 29G X 1/2\" 0.5 ML MISC 1 each by Does not apply route daily 12/3/19  Yes TRAY Macias CNP   Insulin Degludec (TRESIBA FLEXTOUCH) 100 UNIT/ML SOPN Inject 100 Units into the skin nightly 12/3/19  Yes TRAY Macias CNP   carvedilol (COREG) 3.125 MG tablet TAKE 1 TABLET BY MOUTH TWICE A DAY WITH MEALS 19  Yes TRAY Ambriz CNP   Liraglutide (VICTOZA) 18 MG/3ML SOPN SC injection Inject 1.2 mg into the skin daily 10/29/19  Yes TRAY Macias CNP   vitamin D (ERGOCALCIFEROL) 1.25 MG (60361 UT) CAPS capsule Take 1 capsule by mouth once a week 10/29/19  Yes Juris Hurt, APRN - CNP   albuterol sulfate  (90 Base) MCG/ACT inhaler INHALE 2 PUFFS BY MOUTH EVERY 6 HOURS AS NEEDED FOR WHEEZING/SHORTNESS OF BREATH 10/7/19  Yes Rosalia Damon MD pantoprazole (PROTONIX) 40 MG tablet Take 1 tablet by mouth 2 times daily 10/1/19  Yes Estrada Burrell MD   buprenorphine-naloxone (SUBOXONE) 8-2 MG SUBL SL tablet PLACE 1 TABLET UNDER THE BERNARDO TWICE A DAY 9/17/19  Yes Historical Provider, MD   nitroGLYCERIN (NITROSTAT) 0.4 MG SL tablet PLEASE SEE ATTACHED FOR DETAILED DIRECTIONS 7/24/19  Yes Historical Provider, MD   clopidogrel (PLAVIX) 75 MG tablet TAKE 1 TABLET BY MOUTH EVERY DAY 9/3/19  Yes TRAY Staples CNP   SPIRIVA RESPIMAT 2.5 MCG/ACT AERS inhaler INHALE 2 PUFFS INTO THE LUNGS DAILY 7/15/19  Yes Matthew Diggs MD   magnesium oxide (MAG-OX) 400 (240 Mg) MG tablet TAKE 1 TABLET ONCE DAILY 5/1/19  Yes Ibeth Greer MD   insulin lispro (HUMALOG KWIKPEN) 100 UNIT/ML pen Inject 15 Units into the skin 3 times daily (before meals) 4/9/19  Yes Mateo Srivastava MD   digoxin (LANOXIN) 125 MCG tablet Take 1 tablet by mouth daily 4/3/19  Yes TRAY Staples CNP   atorvastatin (LIPITOR) 80 MG tablet Take 80 mg by mouth nightly 5/2/18  Yes Historical Provider, MD   busPIRone (BUSPAR) 30 MG tablet Take 30 mg by mouth 2 times daily  4/2/18  Yes Historical Provider, MD   aspirin EC 81 MG EC tablet Take 1 tablet by mouth daily 1/8/16  Yes Kumar Uribe MD   ARIPiprazole (ABILIFY) 10 MG tablet Take 10 mg by mouth daily    Yes Historical Provider, MD   lamoTRIgine (LAMICTAL) 150 MG tablet Take 150 mg by mouth 2 times daily    Yes Historical Provider, MD   Varenicline Tartrate (CHANTIX PO) Take by mouth    Historical Provider, MD       Current medications:    Current Facility-Administered Medications   Medication Dose Route Frequency Provider Last Rate Last Dose    insulin glargine (LANTUS) injection vial 15 Units  15 Units Subcutaneous Nightly Danielle Lute, DO   15 Units at 01/23/20 2200    perflutren lipid microspheres (DEFINITY) injection 1.65 mg  1.5 mL Intravenous ONCE PRN TRAY Staples CNP        melatonin tablet 5 mg  5 mg  CAD (coronary artery disease) I25.10    PVD (peripheral vascular disease) ([facility]) W99.5    Acute systolic congestive heart failure ([facility]) I50.21    Mitral regurgitation I34.0    Myocardiopathy ([facility]) I42.9    Palpitations R00.2    Chronic systolic congestive heart failure ([facility]) I50.22    CKD (chronic kidney disease) stage 3, GFR 30-59 ml/min ([facility]) N18.3    Hypokalemia E87.6    Acute renal failure with tubular necrosis ([facility]) N17.0    Claudication in peripheral vascular disease ([facility]) I73.9    Hypotension I95.9    COPD (chronic obstructive pulmonary disease) ([facility]) J44.9    Cellulitis L03.90    SHAUNNA (acute kidney injury) ([facility]) N17.9    Acute renal insufficiency N28.9    Hypervolemia E87.70    Vitamin D deficiency E55.9    Acute kidney injury ([facility]) N17.9    Cellulitis of both feet L03.115, L03. 116    Acute on chronic systolic congestive heart failure ([facility]) I50.23    Cellulitis of foot, left L03. 116    Peripheral edema R60.9    Diabetic ulcer of toe of right foot associated with type 2 diabetes mellitus ([address] E11.621, L97.519    Diabetic ulcer of left foot associated with type 2 diabetes mellitus ([address] E11.621, L97.529    Hypoglycemia associated with type 2 diabetes mellitus ([address] E11.649    Unstable angina pectoris ([facility]) I20.0    Cardiomyopathy, ischemic I25.5       Past Medical History:        Diagnosis Date    Anxiety and depression     CAD (coronary artery disease) 01/2018    Cardiomyopathy ([address]     CHF (congestive heart failure) ([facility])     COPD (chronic obstructive pulmonary disease) ([facility])     Diabetes mellitus ([facility])     GERD (gastroesophageal reflux disease)     Hyperlipidemia     Hypertension     Hypotension     MI (myocardial infarction) ([address]     Peripheral neuropathy     Peripheral vascular disease ([address]     [facility] HTN [facility]     Reflux     Sleep apnea     has never done sleep study;does not use CPAP    Wears glasses     for reading       Past Surgical History: Abdominal:           Vascular:   + PVD, aortic or cerebral, . Anesthesia Plan      general     ASA 3     (Risks, benefits and alternatives of GA discussed with pt. Questions answered. Willing to proceed.)  Induction: intravenous. Anesthetic plan and risks discussed with patient.                       Torsten Castaneda MD   1/24/2020

## 2020-01-24 NOTE — PROGRESS NOTES
Department of Internal Medicine  Nephrology Progress Note        SUBJECTIVE:    We are following this patient for A/CKD. The patient was seen and examined; she feels well today with no CP, SOB, nausea or vomiting. ROS: No fever or chills. Social: No family at bedside. Physical Exam:    VITALS:  BP 99/67   Pulse 85   Temp 98.5 °F (36.9 °C) (Oral)   Resp 16   Ht 5' 4.5\" (1.638 m)   Wt 246 lb 1.6 oz (111.6 kg)   LMP 10/24/2012   SpO2 97%   BMI 41.59 kg/m²     General appearance: Seems comfortable, no acute distress. Neck: Trachea midline, thyroid normal.   Lungs:  Non labored breathing, CTA to anterior auscultation. Heart:  S1S2 normal, rub or gallop. + peripheral edema. Abdomen: Soft, non-tender, no organomegaly. Skin: No lesions or rashes, warm to touch. DATA:    CBC:   Lab Results   Component Value Date    WBC 11.7 01/24/2020    RBC 3.26 01/24/2020    HGB 9.4 01/24/2020    HCT 28.6 01/24/2020    MCV 87.7 01/24/2020    MCH 28.8 01/24/2020    MCHC 32.8 01/24/2020    RDW 16.2 01/24/2020     01/24/2020    MPV 8.4 01/24/2020     BMP:    Lab Results   Component Value Date     01/24/2020    K 4.4 01/24/2020    CL 94 01/24/2020    CO2 24 01/24/2020    BUN 46 01/24/2020    LABALBU 3.8 01/24/2020    CREATININE 1.7 01/24/2020    CALCIUM 9.2 01/24/2020    GFRAA 38 01/24/2020    LABGLOM 31 01/24/2020    GLUCOSE 152 01/24/2020       IMPRESSION/RECOMMENDATIONS:      1- A/CKD: The patient has chronic kidney disease with a baseline creatinine of 1.5 to 1.7. Her SHAUNNA is likely secondary to a pre-renal component, although a non oliguric ATN in the setting of hypotension cannot be ruled out. Her urinary output is well maintained and her serum creatinine is within baseline range, monitor. 2- Hyponatremia: Improving with daily free water restriction. 3- Hypotension: Holding all antihypertensive medications. 4- Anemia: Stable hemoglobin, monitor. 5- Chest pain: CABG scheduled on 1/27 per CTS.

## 2020-01-25 LAB
A/G RATIO: 1.1 (ref 1.1–2.2)
ALBUMIN SERPL-MCNC: 3.8 G/DL (ref 3.4–5)
ALP BLD-CCNC: 95 U/L (ref 40–129)
ALT SERPL-CCNC: 7 U/L (ref 10–40)
ANION GAP SERPL CALCULATED.3IONS-SCNC: 14 MMOL/L (ref 3–16)
AST SERPL-CCNC: 10 U/L (ref 15–37)
BASOPHILS ABSOLUTE: 0 K/UL (ref 0–0.2)
BASOPHILS RELATIVE PERCENT: 0.3 %
BILIRUB SERPL-MCNC: 0.5 MG/DL (ref 0–1)
BUN BLDV-MCNC: 48 MG/DL (ref 7–20)
CALCIUM SERPL-MCNC: 8.7 MG/DL (ref 8.3–10.6)
CHLORIDE BLD-SCNC: 92 MMOL/L (ref 99–110)
CO2: 22 MMOL/L (ref 21–32)
CREAT SERPL-MCNC: 1.7 MG/DL (ref 0.6–1.1)
EOSINOPHILS ABSOLUTE: 0.2 K/UL (ref 0–0.6)
EOSINOPHILS RELATIVE PERCENT: 1.5 %
GFR AFRICAN AMERICAN: 38
GFR NON-AFRICAN AMERICAN: 31
GLOBULIN: 3.5 G/DL
GLUCOSE BLD-MCNC: 169 MG/DL (ref 70–99)
GLUCOSE BLD-MCNC: 172 MG/DL (ref 70–99)
GLUCOSE BLD-MCNC: 175 MG/DL (ref 70–99)
GLUCOSE BLD-MCNC: 178 MG/DL (ref 70–99)
GLUCOSE BLD-MCNC: 179 MG/DL (ref 70–99)
HCT VFR BLD CALC: 29.3 % (ref 36–48)
HEMOGLOBIN: 9.6 G/DL (ref 12–16)
LYMPHOCYTES ABSOLUTE: 0.9 K/UL (ref 1–5.1)
LYMPHOCYTES RELATIVE PERCENT: 7.5 %
MAGNESIUM: 2.8 MG/DL (ref 1.8–2.4)
MCH RBC QN AUTO: 28.9 PG (ref 26–34)
MCHC RBC AUTO-ENTMCNC: 32.9 G/DL (ref 31–36)
MCV RBC AUTO: 88 FL (ref 80–100)
MONOCYTES ABSOLUTE: 0.7 K/UL (ref 0–1.3)
MONOCYTES RELATIVE PERCENT: 5.5 %
NEUTROPHILS ABSOLUTE: 10.2 K/UL (ref 1.7–7.7)
NEUTROPHILS RELATIVE PERCENT: 85.2 %
PDW BLD-RTO: 16.4 % (ref 12.4–15.4)
PERFORMED ON: ABNORMAL
PLATELET # BLD: 353 K/UL (ref 135–450)
PMV BLD AUTO: 8.7 FL (ref 5–10.5)
POTASSIUM REFLEX MAGNESIUM: 4.2 MMOL/L (ref 3.5–5.1)
RBC # BLD: 3.34 M/UL (ref 4–5.2)
SODIUM BLD-SCNC: 128 MMOL/L (ref 136–145)
TOTAL PROTEIN: 7.3 G/DL (ref 6.4–8.2)
WBC # BLD: 11.9 K/UL (ref 4–11)

## 2020-01-25 PROCEDURE — 99231 SBSQ HOSP IP/OBS SF/LOW 25: CPT | Performed by: THORACIC SURGERY (CARDIOTHORACIC VASCULAR SURGERY)

## 2020-01-25 PROCEDURE — 83735 ASSAY OF MAGNESIUM: CPT

## 2020-01-25 PROCEDURE — 6370000000 HC RX 637 (ALT 250 FOR IP): Performed by: STUDENT IN AN ORGANIZED HEALTH CARE EDUCATION/TRAINING PROGRAM

## 2020-01-25 PROCEDURE — 80053 COMPREHEN METABOLIC PANEL: CPT

## 2020-01-25 PROCEDURE — 36415 COLL VENOUS BLD VENIPUNCTURE: CPT

## 2020-01-25 PROCEDURE — 85025 COMPLETE CBC W/AUTO DIFF WBC: CPT

## 2020-01-25 PROCEDURE — 2580000003 HC RX 258: Performed by: INTERNAL MEDICINE

## 2020-01-25 PROCEDURE — 2580000003 HC RX 258: Performed by: STUDENT IN AN ORGANIZED HEALTH CARE EDUCATION/TRAINING PROGRAM

## 2020-01-25 PROCEDURE — 94640 AIRWAY INHALATION TREATMENT: CPT

## 2020-01-25 PROCEDURE — 1200000000 HC SEMI PRIVATE

## 2020-01-25 RX ORDER — LIDOCAINE 4 G/G
1 PATCH TOPICAL DAILY
Status: DISCONTINUED | OUTPATIENT
Start: 2020-01-25 | End: 2020-01-27

## 2020-01-25 RX ADMIN — MELATONIN TAB 5 MG 5 MG: 5 TAB at 23:42

## 2020-01-25 RX ADMIN — ACETAMINOPHEN 650 MG: 325 TABLET ORAL at 20:52

## 2020-01-25 RX ADMIN — Medication 250 MG: at 08:16

## 2020-01-25 RX ADMIN — INSULIN LISPRO 2 UNITS: 100 INJECTION, SOLUTION INTRAVENOUS; SUBCUTANEOUS at 08:20

## 2020-01-25 RX ADMIN — BUSPIRONE HYDROCHLORIDE 30 MG: 5 TABLET ORAL at 08:15

## 2020-01-25 RX ADMIN — LAMOTRIGINE 150 MG: 100 TABLET ORAL at 08:15

## 2020-01-25 RX ADMIN — Medication 250 MG: at 20:37

## 2020-01-25 RX ADMIN — ACETAMINOPHEN 650 MG: 325 TABLET ORAL at 02:39

## 2020-01-25 RX ADMIN — BUSPIRONE HYDROCHLORIDE 30 MG: 5 TABLET ORAL at 20:36

## 2020-01-25 RX ADMIN — INSULIN LISPRO 2 UNITS: 100 INJECTION, SOLUTION INTRAVENOUS; SUBCUTANEOUS at 17:53

## 2020-01-25 RX ADMIN — Medication 10 ML: at 20:47

## 2020-01-25 RX ADMIN — SILVER SULFADIAZINE 1 APPLICATOR: 10 CREAM TOPICAL at 08:21

## 2020-01-25 RX ADMIN — INSULIN LISPRO 1 UNITS: 100 INJECTION, SOLUTION INTRAVENOUS; SUBCUTANEOUS at 20:40

## 2020-01-25 RX ADMIN — PANTOPRAZOLE SODIUM 40 MG: 40 TABLET, DELAYED RELEASE ORAL at 08:16

## 2020-01-25 RX ADMIN — BUPRENORPHINE AND NALOXONE 1 FILM: 8; 2 FILM, SOLUBLE BUCCAL; SUBLINGUAL at 08:14

## 2020-01-25 RX ADMIN — BENZTROPINE MESYLATE 1 MG: 1 TABLET ORAL at 08:30

## 2020-01-25 RX ADMIN — INSULIN LISPRO 2 UNITS: 100 INJECTION, SOLUTION INTRAVENOUS; SUBCUTANEOUS at 11:31

## 2020-01-25 RX ADMIN — MAGNESIUM OXIDE TAB 400 MG (241.3 MG ELEMENTAL MG) 400 MG: 400 (241.3 MG) TAB at 08:16

## 2020-01-25 RX ADMIN — ATORVASTATIN CALCIUM 80 MG: 80 TABLET, FILM COATED ORAL at 20:39

## 2020-01-25 RX ADMIN — MAGNESIUM HYDROXIDE 30 ML: 400 SUSPENSION ORAL at 10:05

## 2020-01-25 RX ADMIN — ASPIRIN 81 MG: 81 TABLET, COATED ORAL at 10:06

## 2020-01-25 RX ADMIN — Medication 10 ML: at 08:17

## 2020-01-25 RX ADMIN — LAMOTRIGINE 150 MG: 100 TABLET ORAL at 20:37

## 2020-01-25 RX ADMIN — TIOTROPIUM BROMIDE 18 MCG: 18 CAPSULE ORAL; RESPIRATORY (INHALATION) at 08:53

## 2020-01-25 RX ADMIN — BUPRENORPHINE AND NALOXONE 1 FILM: 8; 2 FILM, SOLUBLE BUCCAL; SUBLINGUAL at 20:39

## 2020-01-25 RX ADMIN — PANTOPRAZOLE SODIUM 40 MG: 40 TABLET, DELAYED RELEASE ORAL at 16:55

## 2020-01-25 RX ADMIN — ERGOCALCIFEROL 50000 UNITS: 1.25 CAPSULE ORAL at 08:14

## 2020-01-25 RX ADMIN — INSULIN GLARGINE 15 UNITS: 100 INJECTION, SOLUTION SUBCUTANEOUS at 20:39

## 2020-01-25 RX ADMIN — ARIPIPRAZOLE 10 MG: 10 TABLET ORAL at 08:16

## 2020-01-25 ASSESSMENT — PAIN SCALES - GENERAL
PAINLEVEL_OUTOF10: 7
PAINLEVEL_OUTOF10: 5

## 2020-01-25 ASSESSMENT — PAIN SCALES - WONG BAKER
WONGBAKER_NUMERICALRESPONSE: 0

## 2020-01-25 NOTE — PROGRESS NOTES
Inpatient Family Medicine Progress Note      PCP: Lorenza Carrasquillo PA-C    Date of Admission: 1/17/2020    Chief Complaint:  Chest pain    Hospital Course: 62 y.o. female who presented to [facility] with strong chest pain that started around 0930 on 01/17. Started when she was getting ready to go to see Dr Lenny Moreland. Felt like something sitting on her chest. Pain was 10/10, associated with shortness of breath and nausea. Took 2 Nitro at home which helped. Admitted to some pain with breathing and when walking x 2 days as well as some back pain. Has a history of previous MI, but she does not remember her previous MI. Denies any recent injuries but admits to falling last week but there was no pain at that time. Has had 3 MIs with 6 stents. Has had CardioMEMs. Was recently admitted with acute on chronic kidney disease, as well as ulcers on both feet. While here in the hospital, patient had cardiac cath that showed 4 vessel disease, vein mapping done yesterday, CABG planned for Monday. Subjective: Patient ambulating back from restroom when evaluated this AM. She reports having some right mid-back pain radiating to the RUQ abdomen. Otherwise, no acute events overnight. Denies any fevers, chills, chest pain, SOB, nausea, vomiting, diarrhea, constipation, or dysuria.        Medications:  Reviewed    Infusion Medications    sodium chloride      dextrose       Scheduled Medications    silver sulfADIAZINE   Topical Daily    sodium chloride flush  10 mL Intravenous 2 times per day    acetylcysteine  1,200 mg Oral BID    insulin glargine  5 Units Subcutaneous Nightly    buprenorphine-naloxone  1 Film Sublingual Q12H    lamoTRIgine  150 mg Oral BID    ARIPiprazole  10 mg Oral Daily    aspirin EC  81 mg Oral Daily    atorvastatin  80 mg Oral Nightly    busPIRone  30 mg Oral BID    magnesium oxide  400 mg Oral Daily    pantoprazole  40 mg Oral BID    vitamin D  50,000 Units Oral Weekly    [Held by provider] carvedilol  3.125 mg Oral BID    benztropine  1 mg Oral Daily    saccharomyces boulardii  250 mg Oral BID    sodium chloride flush  10 mL Intravenous 2 times per day    tiotropium  18 mcg Inhalation Daily    insulin lispro  0-12 Units Subcutaneous TID     insulin lispro  0-6 Units Subcutaneous Nightly    nicotine  1 patch Transdermal Daily     PRN Meds: melatonin, sodium chloride flush, acetaminophen, nitroGLYCERIN, sodium chloride flush, magnesium hydroxide, ondansetron, glucose, dextrose, glucagon (rDNA), dextrose, albuterol sulfate HFA, acetaminophen      Intake/Output Summary (Last 24 hours) at 1/20/2020 1823  Last data filed at 1/20/2020 1235  Gross per 24 hour   Intake --   Output 2375 ml   Net -2375 ml       Physical Exam Performed:    /75   Pulse 85   Temp 98.4 °F (36.9 °C) (Oral)   Resp 16   Ht 5' 4.5\" (1.638 m)   Wt 241 lb 11.2 oz (109.6 kg)   LMP 10/24/2012   SpO2 98%   BMI 40.85 kg/m²     Physical Exam  Vitals signs reviewed. Constitutional:       General: She is not in acute distress. Appearance: Normal appearance. She is obese. She is not ill-appearing. HENT:      Head: Normocephalic and atraumatic. Eyes:      Conjunctiva/sclera: Conjunctivae normal.   Cardiovascular:      Rate and Rhythm: Normal rate and regular rhythm. Heart sounds: No murmur. No friction rub. No gallop. Pulmonary:      Effort: Pulmonary effort is normal.      Breath sounds: No wheezing, rhonchi or rales. Abdominal:      General: Abdomen is flat. Bowel sounds are normal.      Palpations: Abdomen is soft. Tenderness: There is no guarding or rebound. Musculoskeletal:         General: Tenderness (lower extremeties, mild) present. Right lower leg: Edema (2+ pitting, improved from admission) present. Left lower leg: Edema (2+ pitting,  improved from admission) present. Skin:     General: Skin is warm and dry.       Findings: Erythema ( lower extremities consistent with venous stasis, improved from admission) present. Comments: Noted diabetic ulcers on both feet. Please see wound care note for images and full details. Veins marked on legs by black surgical pens. Neurological:      General: No focal deficit present. Mental Status: She is alert and oriented to person, place, and time. Psychiatric:         Mood and Affect: Mood normal.         Behavior: Behavior normal.   Back:     Tenderness to palpation of the right middle back         Labs:   Recent Labs     01/18/20  0842 01/19/20  0640 01/20/20  0748   WBC 9.4 10.7 13.0*   HGB 10.0* 10.0* 9.9*   HCT 30.2* 29.8* 30.6*    404 402     Recent Labs     01/19/20  0640 01/20/20  0209 01/20/20  0748   * 123* 125*   K 3.8 4.3 4.5   CL 85* 81* 84*   CO2 29 29 27   BUN 53* 51* 49*   CREATININE 2.0* 2.1* 1.9*   CALCIUM 9.2 9.4 9.6     Recent Labs     01/18/20  0842 01/19/20  0640 01/20/20  0748   AST 11* 9* 6*   ALT <5* 7* 6*   BILITOT 0.4 0.4 0.5   ALKPHOS 97 96 95     No results for input(s): INR in the last 72 hours. Recent Labs     01/17/20 2037   TROPONINI 0.01       Urinalysis:      Lab Results   Component Value Date    NITRU Negative 01/17/2020    WBCUA 3-5 09/28/2019    BACTERIA Rare 09/28/2019    RBCUA None seen 09/28/2019    BLOODU Negative 01/17/2020    SPECGRAV 1.010 01/17/2020    GLUCOSEU Negative 01/17/2020       Radiology:  XR CHEST STANDARD (2 VW)   Final Result   Cardiomegaly without evidence of acute cardiopulmonary process. Transthoracic Echo (1/22/2020)  Technically difficult examination secondary to habitus. The left ventricular systolic function is severely reduced with an ejection fraction of 25%. There is severe hypokinesis of the anterior and apical walls consistent with infarction within the territory of the left anterior descending artery. Grade II diastolic dysfunction with elevated left ventricular filling pressure. Moderate to severe mitral regurgitation.   Mild --> 1.7 --> 1.7 today   - Nephrology following and currently off diuretics  - Avoid nephrotoxins    Low back pain  - Apply Lidoderm patch to upper back region     Hyponatremia  - Stable  - Na 125 --> 127 --> 129 --> 130 --> 128 today  - Nephrology following   - Diuretics held  - Daily free water restriction (1000 ml/day)    Hypoglycemia (resolved)  - On Lantus 15U (down from home dose of 100 U) and SSI  - May consider increasing Lantus further based on glucose tomorrow night.  - POC glucose x 24 hrs: 158-220; last glucose 175    Diabetic Foot Ulcers  - Wound care to manage. Does not seem infected or gangrenous. DVT Prophylaxis: Heparin    Diet: DIET RENAL    Code Status: Full Code    PT/OT Eval Status: n/a    Dispo - CABG scheduled on 1/27     This patient was seen and evaluated with resident team and attending, Dr. Mich Scherer.       [address] [facility] [address] [facility]  PGY-2  (available by [facility])

## 2020-01-25 NOTE — PROGRESS NOTES
Patient with no clinical issues, no chest pain  Plan for Surgery Monday  All questions answered    Maksim Ventura MD  Cardiothoracic Surgery

## 2020-01-25 NOTE — PROGRESS NOTES
Department of Internal Medicine  Nephrology Progress Note        SUBJECTIVE:    We are following this patient for A/CKD. The patient was seen and examined; she feels well today with no CP, SOB, nausea or vomiting. ROS: No fever or chills. Social: Family at bedside. Physical Exam:    VITALS:  BP 99/66   Pulse 83   Temp 98.3 °F (36.8 °C) (Oral)   Resp 16   Ht 5' 4.5\" (1.638 m)   Wt 248 lb 9.6 oz (112.8 kg)   LMP 10/24/2012   SpO2 93%   BMI 42.01 kg/m²     General appearance: Seems comfortable, no acute distress. Neck: Trachea midline, thyroid normal.   Lungs:  Non labored breathing, CTA to anterior auscultation. Heart:  S1S2 normal, rub or gallop. + peripheral edema. Abdomen: Soft, non-tender, no organomegaly. Skin: No lesions or rashes, warm to touch. DATA:    CBC:   Lab Results   Component Value Date    WBC 11.9 01/25/2020    RBC 3.34 01/25/2020    HGB 9.6 01/25/2020    HCT 29.3 01/25/2020    MCV 88.0 01/25/2020    MCH 28.9 01/25/2020    MCHC 32.9 01/25/2020    RDW 16.4 01/25/2020     01/25/2020    MPV 8.7 01/25/2020     BMP:    Lab Results   Component Value Date     01/25/2020    K 4.2 01/25/2020    CL 92 01/25/2020    CO2 22 01/25/2020    BUN 48 01/25/2020    LABALBU 3.8 01/25/2020    CREATININE 1.7 01/25/2020    CALCIUM 8.7 01/25/2020    GFRAA 38 01/25/2020    LABGLOM 31 01/25/2020    GLUCOSE 169 01/25/2020       IMPRESSION/RECOMMENDATIONS:      1- A/CKD: The patient has chronic kidney disease with a baseline creatinine of 1.5 to 1.7. Her SHAUNNA is likely secondary to a pre-renal component, although a non oliguric ATN in the setting of hypotension cannot be ruled out. Her urinary output is well maintained and her serum creatinine is within baseline range, monitor. 2- Hyponatremia: Continue daily free water restriction. 3- Hypotension: Holding all antihypertensive medications. 4- Anemia: Stable hemoglobin, monitor. 5- Chest pain: CABG scheduled on 1/27 per CTS.

## 2020-01-26 PROBLEM — F41.9 ACUTE ANXIETY: Status: ACTIVE | Noted: 2020-01-26

## 2020-01-26 LAB
A/G RATIO: 1.1 (ref 1.1–2.2)
ABO/RH: NORMAL
ALBUMIN SERPL-MCNC: 3.6 G/DL (ref 3.4–5)
ALP BLD-CCNC: 92 U/L (ref 40–129)
ALT SERPL-CCNC: 7 U/L (ref 10–40)
ANION GAP SERPL CALCULATED.3IONS-SCNC: 14 MMOL/L (ref 3–16)
ANTIBODY SCREEN: NORMAL
AST SERPL-CCNC: 9 U/L (ref 15–37)
BASOPHILS ABSOLUTE: 0.1 K/UL (ref 0–0.2)
BASOPHILS RELATIVE PERCENT: 0.6 %
BILIRUB SERPL-MCNC: 0.5 MG/DL (ref 0–1)
BUN BLDV-MCNC: 46 MG/DL (ref 7–20)
CALCIUM SERPL-MCNC: 8.7 MG/DL (ref 8.3–10.6)
CHLORIDE BLD-SCNC: 95 MMOL/L (ref 99–110)
CO2: 22 MMOL/L (ref 21–32)
CREAT SERPL-MCNC: 1.7 MG/DL (ref 0.6–1.1)
EOSINOPHILS ABSOLUTE: 0.2 K/UL (ref 0–0.6)
EOSINOPHILS RELATIVE PERCENT: 1.3 %
GFR AFRICAN AMERICAN: 38
GFR NON-AFRICAN AMERICAN: 31
GLOBULIN: 3.4 G/DL
GLUCOSE BLD-MCNC: 135 MG/DL (ref 70–99)
GLUCOSE BLD-MCNC: 155 MG/DL (ref 70–99)
GLUCOSE BLD-MCNC: 165 MG/DL (ref 70–99)
GLUCOSE BLD-MCNC: 182 MG/DL (ref 70–99)
GLUCOSE BLD-MCNC: 194 MG/DL (ref 70–99)
HCT VFR BLD CALC: 28.7 % (ref 36–48)
HEMOGLOBIN: 9.3 G/DL (ref 12–16)
LYMPHOCYTES ABSOLUTE: 0.9 K/UL (ref 1–5.1)
LYMPHOCYTES RELATIVE PERCENT: 7.5 %
MAGNESIUM: 3 MG/DL (ref 1.8–2.4)
MCH RBC QN AUTO: 28.7 PG (ref 26–34)
MCHC RBC AUTO-ENTMCNC: 32.3 G/DL (ref 31–36)
MCV RBC AUTO: 88.7 FL (ref 80–100)
MONOCYTES ABSOLUTE: 0.6 K/UL (ref 0–1.3)
MONOCYTES RELATIVE PERCENT: 4.9 %
NEUTROPHILS ABSOLUTE: 10.5 K/UL (ref 1.7–7.7)
NEUTROPHILS RELATIVE PERCENT: 85.7 %
PDW BLD-RTO: 16.4 % (ref 12.4–15.4)
PERFORMED ON: ABNORMAL
PLATELET # BLD: 318 K/UL (ref 135–450)
PMV BLD AUTO: 8.9 FL (ref 5–10.5)
POTASSIUM REFLEX MAGNESIUM: 3.8 MMOL/L (ref 3.5–5.1)
RBC # BLD: 3.24 M/UL (ref 4–5.2)
SODIUM BLD-SCNC: 131 MMOL/L (ref 136–145)
TOTAL PROTEIN: 7 G/DL (ref 6.4–8.2)
WBC # BLD: 12.3 K/UL (ref 4–11)

## 2020-01-26 PROCEDURE — 6370000000 HC RX 637 (ALT 250 FOR IP): Performed by: NURSE PRACTITIONER

## 2020-01-26 PROCEDURE — 85025 COMPLETE CBC W/AUTO DIFF WBC: CPT

## 2020-01-26 PROCEDURE — 86850 RBC ANTIBODY SCREEN: CPT

## 2020-01-26 PROCEDURE — 86900 BLOOD TYPING SEROLOGIC ABO: CPT

## 2020-01-26 PROCEDURE — 36415 COLL VENOUS BLD VENIPUNCTURE: CPT

## 2020-01-26 PROCEDURE — 6370000000 HC RX 637 (ALT 250 FOR IP): Performed by: STUDENT IN AN ORGANIZED HEALTH CARE EDUCATION/TRAINING PROGRAM

## 2020-01-26 PROCEDURE — 86901 BLOOD TYPING SEROLOGIC RH(D): CPT

## 2020-01-26 PROCEDURE — 1200000000 HC SEMI PRIVATE

## 2020-01-26 PROCEDURE — 99231 SBSQ HOSP IP/OBS SF/LOW 25: CPT | Performed by: THORACIC SURGERY (CARDIOTHORACIC VASCULAR SURGERY)

## 2020-01-26 PROCEDURE — 80053 COMPREHEN METABOLIC PANEL: CPT

## 2020-01-26 PROCEDURE — 6370000000 HC RX 637 (ALT 250 FOR IP): Performed by: INTERNAL MEDICINE

## 2020-01-26 PROCEDURE — 81003 URINALYSIS AUTO W/O SCOPE: CPT

## 2020-01-26 PROCEDURE — 86923 COMPATIBILITY TEST ELECTRIC: CPT

## 2020-01-26 PROCEDURE — 83735 ASSAY OF MAGNESIUM: CPT

## 2020-01-26 PROCEDURE — 94640 AIRWAY INHALATION TREATMENT: CPT

## 2020-01-26 PROCEDURE — 2580000003 HC RX 258: Performed by: INTERNAL MEDICINE

## 2020-01-26 RX ORDER — CHLORHEXIDINE GLUCONATE 0.12 MG/ML
15 RINSE ORAL ONCE
Status: COMPLETED | OUTPATIENT
Start: 2020-01-27 | End: 2020-01-27

## 2020-01-26 RX ORDER — HYDROXYZINE HYDROCHLORIDE 10 MG/1
10 TABLET, FILM COATED ORAL 3 TIMES DAILY PRN
Status: DISCONTINUED | OUTPATIENT
Start: 2020-01-26 | End: 2020-01-27

## 2020-01-26 RX ORDER — INSULIN GLARGINE 100 [IU]/ML
18 INJECTION, SOLUTION SUBCUTANEOUS NIGHTLY
Status: DISCONTINUED | OUTPATIENT
Start: 2020-01-26 | End: 2020-01-27

## 2020-01-26 RX ORDER — CHLORHEXIDINE GLUCONATE 4 G/100ML
SOLUTION TOPICAL 2 TIMES DAILY
Status: COMPLETED | OUTPATIENT
Start: 2020-01-26 | End: 2020-01-27

## 2020-01-26 RX ADMIN — LAMOTRIGINE 150 MG: 100 TABLET ORAL at 21:10

## 2020-01-26 RX ADMIN — HYDROXYZINE HYDROCHLORIDE 10 MG: 10 TABLET, FILM COATED ORAL at 18:28

## 2020-01-26 RX ADMIN — BUPRENORPHINE AND NALOXONE 1 FILM: 8; 2 FILM, SOLUBLE BUCCAL; SUBLINGUAL at 21:10

## 2020-01-26 RX ADMIN — BUSPIRONE HYDROCHLORIDE 30 MG: 5 TABLET ORAL at 21:09

## 2020-01-26 RX ADMIN — BENZTROPINE MESYLATE 1 MG: 1 TABLET ORAL at 08:57

## 2020-01-26 RX ADMIN — LAMOTRIGINE 150 MG: 100 TABLET ORAL at 08:55

## 2020-01-26 RX ADMIN — BUSPIRONE HYDROCHLORIDE 30 MG: 5 TABLET ORAL at 08:55

## 2020-01-26 RX ADMIN — INSULIN LISPRO 1 UNITS: 100 INJECTION, SOLUTION INTRAVENOUS; SUBCUTANEOUS at 21:10

## 2020-01-26 RX ADMIN — INSULIN GLARGINE 18 UNITS: 100 INJECTION, SOLUTION SUBCUTANEOUS at 21:10

## 2020-01-26 RX ADMIN — BUPRENORPHINE AND NALOXONE 1 FILM: 8; 2 FILM, SOLUBLE BUCCAL; SUBLINGUAL at 09:00

## 2020-01-26 RX ADMIN — Medication 10 ML: at 08:57

## 2020-01-26 RX ADMIN — TIOTROPIUM BROMIDE 18 MCG: 18 CAPSULE ORAL; RESPIRATORY (INHALATION) at 08:07

## 2020-01-26 RX ADMIN — ARIPIPRAZOLE 10 MG: 10 TABLET ORAL at 08:56

## 2020-01-26 RX ADMIN — MELATONIN TAB 5 MG 5 MG: 5 TAB at 23:18

## 2020-01-26 RX ADMIN — CHLORHEXIDINE GLUCONATE: 213 SOLUTION TOPICAL at 23:05

## 2020-01-26 RX ADMIN — PANTOPRAZOLE SODIUM 40 MG: 40 TABLET, DELAYED RELEASE ORAL at 15:42

## 2020-01-26 RX ADMIN — SILVER SULFADIAZINE: 10 CREAM TOPICAL at 08:57

## 2020-01-26 RX ADMIN — PANTOPRAZOLE SODIUM 40 MG: 40 TABLET, DELAYED RELEASE ORAL at 08:56

## 2020-01-26 RX ADMIN — HYDROXYZINE HYDROCHLORIDE 10 MG: 10 TABLET, FILM COATED ORAL at 09:24

## 2020-01-26 RX ADMIN — INSULIN LISPRO 2 UNITS: 100 INJECTION, SOLUTION INTRAVENOUS; SUBCUTANEOUS at 08:55

## 2020-01-26 RX ADMIN — ACETAMINOPHEN 650 MG: 325 TABLET ORAL at 11:02

## 2020-01-26 RX ADMIN — Medication 250 MG: at 21:09

## 2020-01-26 RX ADMIN — Medication 250 MG: at 08:55

## 2020-01-26 RX ADMIN — ATORVASTATIN CALCIUM 80 MG: 80 TABLET, FILM COATED ORAL at 21:10

## 2020-01-26 RX ADMIN — INSULIN LISPRO 2 UNITS: 100 INJECTION, SOLUTION INTRAVENOUS; SUBCUTANEOUS at 17:45

## 2020-01-26 RX ADMIN — Medication 10 ML: at 21:11

## 2020-01-26 RX ADMIN — INSULIN LISPRO 2 UNITS: 100 INJECTION, SOLUTION INTRAVENOUS; SUBCUTANEOUS at 12:08

## 2020-01-26 RX ADMIN — ASPIRIN 81 MG: 81 TABLET, COATED ORAL at 08:56

## 2020-01-26 RX ADMIN — MUPIROCIN: 20 OINTMENT TOPICAL at 23:05

## 2020-01-26 ASSESSMENT — PAIN SCALES - WONG BAKER
WONGBAKER_NUMERICALRESPONSE: 0

## 2020-01-26 ASSESSMENT — PAIN SCALES - GENERAL: PAINLEVEL_OUTOF10: 6

## 2020-01-26 NOTE — PLAN OF CARE
Patient's EF (Ejection Fraction) is greater than 40%    Heart Failure Medications:  Diuretics[de-identified] Furosemide, Torsemide, Spironolactone, Metalozone, Other, and None  ACE[de-identified] Lisinopril, Enalapril,  Ramipril, Other, and None  ARB[de-identified] Valsartan, Losartan, Other, and None  ARNI[de-identified] None  Evidenced Based Beta Blocker[de-identified] Metoprolol SUCCinate- Toprol XL, Carvedilol- Coreg, Bisoprolol, and None    Patient's weights and intake/output reviewed: No    Patient's Last Weight: 249.9 lbs obtained by standing scale. Difference of 1 lbs more than last documented weight. Intake/Output Summary (Last 24 hours) at 1/26/2020 1017  Last data filed at 1/26/2020 1794  Gross per 24 hour   Intake 1000 ml   Output 1400 ml   Net -400 ml       Comorbidities Reviewed No    Patient has a past medical history of Anxiety and depression, CAD (coronary artery disease), Cardiomyopathy ([address] CHF (congestive heart failure) ([address] COPD (chronic obstructive pulmonary disease) ([address] Diabetes mellitus ([address] GERD (gastroesophageal reflux disease), Hyperlipidemia, Hypertension, Hypotension, MI (myocardial infarction) ([address] Peripheral neuropathy, Peripheral vascular disease ([address] Pulmonary HTN ([address] Reflux, Sleep apnea, and Wears glasses. >>For CHF and Comorbidity documentation on Education Time and Topics, please see Education Tab    Patient stated Daily Functional Goal: (Note:help the patient identify a challenging but achievable goal per shift that can aid in progression towards increased functional capacity at discharge ie sit in the chair for x amount of time, Decrease walk time by x seconds, stand or walk with minimal assistance, decrease pain to a level of x/10, etc)   Pt sitting in bed at this time on room air. Pt denies shortness of breath. Pt with pitting lower extremity edema.      Patient and/or Family's stated Goal of Care this Admission: reduce shortness of breath, increase activity tolerance, better understand heart failure and disease management, be more comfortable, and reduce lower extremity edema prior to discharge

## 2020-01-26 NOTE — PROGRESS NOTES
Department of Internal Medicine  Nephrology Progress Note        SUBJECTIVE:    We are following this patient for A/CKD. The patient was seen and examined; she feels well today with no CP, SOB, nausea or vomiting. ROS: No fever or chills. Social: Family at bedside. Physical Exam:    VITALS:  /76   Pulse 88   Temp 97.9 °F (36.6 °C) (Axillary)   Resp 18   Ht 5' 4.5\" (1.638 m)   Wt 249 lb 9.6 oz (113.2 kg)   LMP 10/24/2012   SpO2 96%   BMI 42.18 kg/m²     General appearance: Seems comfortable, no acute distress. Neck: Trachea midline, thyroid normal.   Lungs:  Non labored breathing, CTA to anterior auscultation. Heart:  S1S2 normal, rub or gallop. Trace peripheral edema. Abdomen: Soft, non-tender, no organomegaly. Skin: No lesions or rashes, warm to touch. DATA:    CBC:   Lab Results   Component Value Date    WBC 12.3 01/26/2020    RBC 3.24 01/26/2020    HGB 9.3 01/26/2020    HCT 28.7 01/26/2020    MCV 88.7 01/26/2020    MCH 28.7 01/26/2020    MCHC 32.3 01/26/2020    RDW 16.4 01/26/2020     01/26/2020    MPV 8.9 01/26/2020     BMP:    Lab Results   Component Value Date     01/26/2020    K 3.8 01/26/2020    CL 95 01/26/2020    CO2 22 01/26/2020    BUN 46 01/26/2020    LABALBU 3.6 01/26/2020    CREATININE 1.7 01/26/2020    CALCIUM 8.7 01/26/2020    GFRAA 38 01/26/2020    LABGLOM 31 01/26/2020    GLUCOSE 135 01/26/2020       IMPRESSION/RECOMMENDATIONS:      1- A/CKD: The patient has chronic kidney disease with a baseline creatinine of 1.5 to 1.7. Her SHAUNNA is likely secondary to a pre-renal component, although a non oliguric ATN in the setting of hypotension cannot be ruled out. Her urinary output is well maintained and her serum creatinine is within baseline range, monitor. 2- Hyponatremia: Improving with daily free water restriction. 3- Hypotension: Holding all antihypertensive medications. 4- Anemia: Stable hemoglobin, monitor.   5- Chest pain: CABG scheduled on 1/27 per CTS.

## 2020-01-26 NOTE — PROGRESS NOTES
Oral Nightly    busPIRone  30 mg Oral BID    magnesium oxide  400 mg Oral Daily    pantoprazole  40 mg Oral BID    vitamin D  50,000 Units Oral Weekly    [Held by provider] carvedilol  3.125 mg Oral BID    benztropine  1 mg Oral Daily    saccharomyces boulardii  250 mg Oral BID    sodium chloride flush  10 mL Intravenous 2 times per day    tiotropium  18 mcg Inhalation Daily    insulin lispro  0-12 Units Subcutaneous TID WC    insulin lispro  0-6 Units Subcutaneous Nightly    nicotine  1 patch Transdermal Daily     PRN Meds: melatonin, sodium chloride flush, acetaminophen, nitroGLYCERIN, sodium chloride flush, magnesium hydroxide, ondansetron, glucose, dextrose, glucagon (rDNA), dextrose, albuterol sulfate HFA, acetaminophen      Intake/Output Summary (Last 24 hours) at 1/20/2020 1823  Last data filed at 1/20/2020 1235  Gross per 24 hour   Intake --   Output 2375 ml   Net -2375 ml       Physical Exam Performed:    /75   Pulse 85   Temp 98.4 °F (36.9 °C) (Oral)   Resp 16   Ht 5' 4.5\" (1.638 m)   Wt 241 lb 11.2 oz (109.6 kg)   LMP 10/24/2012   SpO2 98%   BMI 40.85 kg/m²     Physical Exam  Vitals signs reviewed. Constitutional:       General: She is not in acute distress. Appearance: Normal appearance. She is obese. She is not ill-appearing. HENT:      Head: Normocephalic and atraumatic. Eyes:      Conjunctiva/sclera: Conjunctivae normal.   Cardiovascular:      Rate and Rhythm: Normal rate and regular rhythm. Heart sounds: No murmur. No friction rub. No gallop. Pulmonary:      Effort: Pulmonary effort is normal.      Breath sounds: No wheezing, rhonchi or rales. Abdominal:      General: Abdomen is flat. Bowel sounds are normal.      Palpations: Abdomen is soft. Tenderness: There is no guarding or rebound. Musculoskeletal:         General: Tenderness (lower extremeties, mild) present. Right lower leg: Edema (2+ pitting) present.       Left lower leg: Edema (2+ pitting) present. Skin:     General: Skin is warm and dry. Findings: Erythema ( lower extremities consistent with venous stasis, improved from admission) present. Comments: Noted diabetic ulcers on both feet. Please see wound care note for images and full details. Veins marked on legs by black surgical pens. Neurological:      General: No focal deficit present. Mental Status: She is alert and oriented to person, place, and time. Psychiatric:         Mood and Affect: Mood normal.         Behavior: Behavior normal.   Back:     Tenderness to palpation of the right middle back         Labs:   Recent Labs     01/18/20  0842 01/19/20  0640 01/20/20  0748   WBC 9.4 10.7 13.0*   HGB 10.0* 10.0* 9.9*   HCT 30.2* 29.8* 30.6*    404 402     Recent Labs     01/19/20  0640 01/20/20  0209 01/20/20  0748   * 123* 125*   K 3.8 4.3 4.5   CL 85* 81* 84*   CO2 29 29 27   BUN 53* 51* 49*   CREATININE 2.0* 2.1* 1.9*   CALCIUM 9.2 9.4 9.6     Recent Labs     01/18/20  0842 01/19/20  0640 01/20/20  0748   AST 11* 9* 6*   ALT <5* 7* 6*   BILITOT 0.4 0.4 0.5   ALKPHOS 97 96 95     No results for input(s): INR in the last 72 hours. Recent Labs     01/17/20 2037   TROPONINI 0.01       Urinalysis:      Lab Results   Component Value Date    NITRU Negative 01/17/2020    WBCUA 3-5 09/28/2019    BACTERIA Rare 09/28/2019    RBCUA None seen 09/28/2019    BLOODU Negative 01/17/2020    SPECGRAV 1.010 01/17/2020    GLUCOSEU Negative 01/17/2020       Radiology:  XR CHEST STANDARD (2 VW)   Final Result   Cardiomegaly without evidence of acute cardiopulmonary process. Transthoracic Echo (1/22/2020)  Technically difficult examination secondary to habitus. The left ventricular systolic function is severely reduced with an ejection fraction of 25%. There is severe hypokinesis of the anterior and apical walls consistent with infarction within the territory of the left anterior descending artery.   Grade II diastolic dysfunction with elevated left ventricular filling pressure. Moderate to severe mitral regurgitation. Mild tricuspid regurgitation. Is estimated at 53 mmHg consistent with moderate pulmonary hypertension (Right atrial pressure of 3 mmHg). If clinically necessary, consider URI for better evaluation of mitral regurgitation. Transesophageal Echo (1/24/2020)  Ejection fraction is visually estimated at 35-40%. Moderate to severe mitral regurgitation with multiple jets visualized. ERO estimated at 0.29 cm2. Mild tricuspid regurgitation. The left atrial appendage shows evidence of thrombus formation and low flow velocities. Moderate amount of plaque in the aorta    Assessment/Plan:    Active Hospital Problems    Diagnosis Date Noted    CAD (coronary artery disease) [I25.10]      Priority: High    Peripheral edema [R60.9] 01/17/2020     Priority: Medium    Hypokalemia [E87.6] 04/08/2019     Priority: Low    CKD (chronic kidney disease) stage 3, GFR 30-59 ml/min (HCC) [N18.3] 01/09/2019     Priority: Low    Hyponatremia [E87.1] 05/23/2018     Priority: Low    Diabetic ulcer of toe of right foot associated with type 2 diabetes mellitus ([address] [V20.340, L97.519] 01/18/2020    Diabetic ulcer of left foot associated with type 2 diabetes mellitus ([address] [D44.047, L97.529] 01/18/2020    Hypoglycemia associated with type 2 diabetes mellitus ([address] [E11.649] 01/18/2020       Coronary artery disease  - Cath performed 1/21, finding 70-80% blockage in LM, LAD, LCx, and 60% in RCA. - CTS following - pt reports that CTS planning on surgery on Monday. - URI on 1/24 as noted above. Peripheral edema  - Stable. Secondary to CKD. - Nephrology following  - Cardiology following - recommending continuing to hold Coreg, Entresto  - Currently off diuretics per Nephrology. Weight slowly increasing (up and additional 1 lbs. Admission weight was 245 lbs. Currently 249 lbs. )  - Daily free water restriction     Chronic

## 2020-01-26 NOTE — PLAN OF CARE
Problem: Serum Glucose Level - Abnormal:  Goal: Ability to maintain appropriate glucose levels will improve  Outcome: Ongoing     Problem: OXYGENATION/RESPIRATORY FUNCTION  Goal: Patient will maintain patent airway  Outcome: Ongoing       Patient's EF (Ejection Fraction) is less than 40%    Heart Failure Medications:  Diuretics[de-identified] Furosemide, Torsemide, Spironolactone, Metalozone, Other, and None  ACE[de-identified] Lisinopril, Enalapril,  Ramipril, Other, and None  ARB[de-identified] Valsartan, Losartan, Other, and None  ARNI[de-identified] None  Evidenced Based Beta Blocker[de-identified] Metoprolol SUCCinate- Toprol XL, Carvedilol- Coreg, Bisoprolol, and None    Patient's weights and intake/output reviewed: Yes    Patient's Last Weight: 249.6lb lbs obtained by standing scale. Difference of 1 lbs more than last documented weight. Intake/Output Summary (Last 24 hours) at 1/26/2020 6659  Last data filed at 1/26/2020 0645  Gross per 24 hour   Intake 1240 ml   Output 2100 ml   Net -860 ml       Comorbidities Reviewed Yes    Patient has a past medical history of Anxiety and depression, CAD (coronary artery disease), Cardiomyopathy ([address] CHF (congestive heart failure) ([address] COPD (chronic obstructive pulmonary disease) ([address] Diabetes mellitus ([address] GERD (gastroesophageal reflux disease), Hyperlipidemia, Hypertension, Hypotension, MI (myocardial infarction) ([address] Peripheral neuropathy, Peripheral vascular disease ([address] Pulmonary HTN ([address] Reflux, Sleep apnea, and Wears glasses. >>For CHF and Comorbidity documentation on Education Time and Topics, please see Education Tab    Patient stated Daily Functional Goal:  to become less anxious prior to cabg prodedure. Pt resting in bed at this time on room air. Pt denies shortness of breath. Pt with nonpitting lower extremity edema.      Patient and/or Family's stated Goal of Care this Admission: reduce shortness of breath, increase activity tolerance, better understand heart failure and disease management, be

## 2020-01-26 NOTE — PROGRESS NOTES
Pt. Wanted to make a POA before surgery in am.  Spoke with [facility] and they will be here today to fill it out.

## 2020-01-26 NOTE — PROGRESS NOTES
Pt. Sitting up on side of bed. A&Ox4. Denies pain. Lungs cta. SR on the monitor. No questions regarding education book for CABG. Anxious at present. Spoke with Dr. Diana Martinez who ordered Atarax for pt.

## 2020-01-27 ENCOUNTER — APPOINTMENT (OUTPATIENT)
Dept: GENERAL RADIOLOGY | Age: 55
DRG: 166 | End: 2020-01-27
Payer: COMMERCIAL

## 2020-01-27 ENCOUNTER — ANESTHESIA (OUTPATIENT)
Dept: OPERATING ROOM | Age: 55
DRG: 166 | End: 2020-01-27
Payer: COMMERCIAL

## 2020-01-27 VITALS — DIASTOLIC BLOOD PRESSURE: 47 MMHG | SYSTOLIC BLOOD PRESSURE: 131 MMHG | TEMPERATURE: 98.2 F | OXYGEN SATURATION: 98 %

## 2020-01-27 LAB
A/G RATIO: 1.1 (ref 1.1–2.2)
ALBUMIN SERPL-MCNC: 3.8 G/DL (ref 3.4–5)
ALP BLD-CCNC: 92 U/L (ref 40–129)
ALT SERPL-CCNC: 9 U/L (ref 10–40)
ANION GAP SERPL CALCULATED.3IONS-SCNC: 11 MMOL/L (ref 3–16)
ANION GAP SERPL CALCULATED.3IONS-SCNC: 13 MMOL/L (ref 3–16)
APTT: 29.2 SEC (ref 24.2–36.2)
AST SERPL-CCNC: 10 U/L (ref 15–37)
BASE EXCESS ARTERIAL: -1 (ref -3–3)
BASE EXCESS ARTERIAL: -2 (ref -3–3)
BASE EXCESS ARTERIAL: -2 (ref -3–3)
BASE EXCESS ARTERIAL: -3 (ref -3–3)
BASE EXCESS ARTERIAL: -3 (ref -3–3)
BASE EXCESS ARTERIAL: -5 (ref -3–3)
BASOPHILS ABSOLUTE: 0.1 K/UL (ref 0–0.2)
BASOPHILS RELATIVE PERCENT: 0.5 %
BILIRUB SERPL-MCNC: 0.6 MG/DL (ref 0–1)
BILIRUBIN DIRECT: <0.2 MG/DL (ref 0–0.3)
BILIRUBIN URINE: NEGATIVE
BILIRUBIN, INDIRECT: ABNORMAL MG/DL (ref 0–1)
BLOOD BANK DISPENSE STATUS: NORMAL
BLOOD BANK PRODUCT CODE: NORMAL
BLOOD, URINE: NEGATIVE
BPU ID: NORMAL
BUN BLDV-MCNC: 46 MG/DL (ref 7–20)
BUN BLDV-MCNC: 53 MG/DL (ref 7–20)
CALCIUM IONIZED: 1.08 MMOL/L (ref 1.12–1.32)
CALCIUM IONIZED: 1.1 MMOL/L (ref 1.12–1.32)
CALCIUM IONIZED: 1.13 MMOL/L (ref 1.12–1.32)
CALCIUM IONIZED: 1.17 MMOL/L (ref 1.12–1.32)
CALCIUM IONIZED: 1.25 MMOL/L (ref 1.12–1.32)
CALCIUM SERPL-MCNC: 8.8 MG/DL (ref 8.3–10.6)
CALCIUM SERPL-MCNC: 8.8 MG/DL (ref 8.3–10.6)
CHLORIDE BLD-SCNC: 100 MMOL/L (ref 99–110)
CHLORIDE BLD-SCNC: 93 MMOL/L (ref 99–110)
CHOLESTEROL, TOTAL: 58 MG/DL (ref 0–199)
CLARITY: CLEAR
CO2: 20 MMOL/L (ref 21–32)
CO2: 21 MMOL/L (ref 21–32)
COLOR: YELLOW
CREAT SERPL-MCNC: 1.4 MG/DL (ref 0.6–1.1)
CREAT SERPL-MCNC: 1.7 MG/DL (ref 0.6–1.1)
DESCRIPTION BLOOD BANK: NORMAL
EOSINOPHILS ABSOLUTE: 0.1 K/UL (ref 0–0.6)
EOSINOPHILS RELATIVE PERCENT: 1.3 %
ESTIMATED AVERAGE GLUCOSE: 128.4 MG/DL
GFR AFRICAN AMERICAN: 38
GFR AFRICAN AMERICAN: 47
GFR NON-AFRICAN AMERICAN: 31
GFR NON-AFRICAN AMERICAN: 39
GLOBULIN: 3.4 G/DL
GLUCOSE BLD-MCNC: 109 MG/DL (ref 70–99)
GLUCOSE BLD-MCNC: 123 MG/DL (ref 70–99)
GLUCOSE BLD-MCNC: 123 MG/DL (ref 70–99)
GLUCOSE BLD-MCNC: 126 MG/DL (ref 70–99)
GLUCOSE BLD-MCNC: 133 MG/DL (ref 70–99)
GLUCOSE BLD-MCNC: 133 MG/DL (ref 70–99)
GLUCOSE BLD-MCNC: 134 MG/DL (ref 70–99)
GLUCOSE BLD-MCNC: 135 MG/DL (ref 70–99)
GLUCOSE BLD-MCNC: 141 MG/DL (ref 70–99)
GLUCOSE BLD-MCNC: 144 MG/DL (ref 70–99)
GLUCOSE BLD-MCNC: 144 MG/DL (ref 70–99)
GLUCOSE BLD-MCNC: 147 MG/DL (ref 70–99)
GLUCOSE BLD-MCNC: 158 MG/DL (ref 70–99)
GLUCOSE BLD-MCNC: 163 MG/DL (ref 70–99)
GLUCOSE BLD-MCNC: 164 MG/DL (ref 70–99)
GLUCOSE BLD-MCNC: 169 MG/DL (ref 70–99)
GLUCOSE BLD-MCNC: 172 MG/DL (ref 70–99)
GLUCOSE BLD-MCNC: 88 MG/DL (ref 70–99)
GLUCOSE URINE: NEGATIVE MG/DL
HBA1C MFR BLD: 6.1 %
HCO3 ARTERIAL: 20.8 MMOL/L (ref 21–29)
HCO3 ARTERIAL: 22.2 MMOL/L (ref 21–29)
HCO3 ARTERIAL: 22.5 MMOL/L (ref 21–29)
HCO3 ARTERIAL: 22.7 MMOL/L (ref 21–29)
HCO3 ARTERIAL: 22.8 MMOL/L (ref 21–29)
HCO3 ARTERIAL: 23.9 MMOL/L (ref 21–29)
HCT VFR BLD CALC: 28.2 % (ref 36–48)
HCT VFR BLD CALC: 28.4 % (ref 36–48)
HDLC SERPL-MCNC: 21 MG/DL (ref 40–60)
HEMOGLOBIN: 10.1 GM/DL (ref 12–16)
HEMOGLOBIN: 7.5 GM/DL (ref 12–16)
HEMOGLOBIN: 7.6 GM/DL (ref 12–16)
HEMOGLOBIN: 7.8 GM/DL (ref 12–16)
HEMOGLOBIN: 8.5 GM/DL (ref 12–16)
HEMOGLOBIN: 9.1 G/DL (ref 12–16)
HEMOGLOBIN: 9.2 G/DL (ref 12–16)
HEMOGLOBIN: 9.5 GM/DL (ref 12–16)
HEMOGLOBIN: 9.7 GM/DL (ref 12–16)
INR BLD: 1.37 (ref 0.86–1.14)
INR BLD: 1.65 (ref 0.86–1.14)
KETONES, URINE: NEGATIVE MG/DL
LACTATE: 0.68 MMOL/L (ref 0.4–2)
LDL CHOLESTEROL CALCULATED: 21 MG/DL
LEUKOCYTE ESTERASE, URINE: NEGATIVE
LYMPHOCYTES ABSOLUTE: 1.1 K/UL (ref 1–5.1)
LYMPHOCYTES RELATIVE PERCENT: 9.5 %
MAGNESIUM: 2.1 MG/DL (ref 1.8–2.4)
MAGNESIUM: 2.4 MG/DL (ref 1.8–2.4)
MCH RBC QN AUTO: 28.6 PG (ref 26–34)
MCH RBC QN AUTO: 28.7 PG (ref 26–34)
MCHC RBC AUTO-ENTMCNC: 32.4 G/DL (ref 31–36)
MCHC RBC AUTO-ENTMCNC: 32.4 G/DL (ref 31–36)
MCV RBC AUTO: 88.3 FL (ref 80–100)
MCV RBC AUTO: 88.6 FL (ref 80–100)
MICROSCOPIC EXAMINATION: NORMAL
MONOCYTES ABSOLUTE: 0.6 K/UL (ref 0–1.3)
MONOCYTES RELATIVE PERCENT: 5 %
NEUTROPHILS ABSOLUTE: 9.3 K/UL (ref 1.7–7.7)
NEUTROPHILS RELATIVE PERCENT: 83.7 %
NITRITE, URINE: NEGATIVE
O2 SAT, ARTERIAL: 100 % (ref 93–100)
O2 SAT, ARTERIAL: 99 % (ref 93–100)
O2 SAT, ARTERIAL: 99 % (ref 93–100)
PCO2 ARTERIAL: 34.6 MM HG (ref 35–45)
PCO2 ARTERIAL: 36.5 MM HG (ref 35–45)
PCO2 ARTERIAL: 37.1 MM HG (ref 35–45)
PCO2 ARTERIAL: 40.1 MM HG (ref 35–45)
PCO2 ARTERIAL: 41.2 MM HG (ref 35–45)
PCO2 ARTERIAL: 42 MM HG (ref 35–45)
PDW BLD-RTO: 16.6 % (ref 12.4–15.4)
PDW BLD-RTO: 16.7 % (ref 12.4–15.4)
PERFORMED ON: ABNORMAL
PERFORMED ON: NORMAL
PH ARTERIAL: 7.34 (ref 7.35–7.45)
PH ARTERIAL: 7.35 (ref 7.35–7.45)
PH ARTERIAL: 7.36 (ref 7.35–7.45)
PH ARTERIAL: 7.37 (ref 7.35–7.45)
PH ARTERIAL: 7.4 (ref 7.35–7.45)
PH ARTERIAL: 7.42 (ref 7.35–7.45)
PH UA: 5.5 (ref 5–8)
PLATELET # BLD: 247 K/UL (ref 135–450)
PLATELET # BLD: 324 K/UL (ref 135–450)
PMV BLD AUTO: 8.1 FL (ref 5–10.5)
PMV BLD AUTO: 8.6 FL (ref 5–10.5)
PO2 ARTERIAL: 158 MM HG (ref 75–108)
PO2 ARTERIAL: 160.9 MM HG (ref 75–108)
PO2 ARTERIAL: 302.5 MM HG (ref 75–108)
PO2 ARTERIAL: 437.7 MM HG (ref 75–108)
PO2 ARTERIAL: 500.1 MM HG (ref 75–108)
PO2 ARTERIAL: 566.2 MM HG (ref 75–108)
POC HEMATOCRIT: 22 % (ref 36–48)
POC HEMATOCRIT: 22 % (ref 36–48)
POC HEMATOCRIT: 23 % (ref 36–48)
POC HEMATOCRIT: 25 % (ref 36–48)
POC HEMATOCRIT: 28 % (ref 36–48)
POC HEMATOCRIT: 29 % (ref 36–48)
POC HEMATOCRIT: 30 % (ref 36–48)
POC POTASSIUM: 3.5 MMOL/L (ref 3.5–5.1)
POC POTASSIUM: 3.7 MMOL/L (ref 3.5–5.1)
POC POTASSIUM: 4.1 MMOL/L (ref 3.5–5.1)
POC POTASSIUM: 4.2 MMOL/L (ref 3.5–5.1)
POC POTASSIUM: 4.5 MMOL/L (ref 3.5–5.1)
POC SAMPLE TYPE: ABNORMAL
POC SODIUM: 131 MMOL/L (ref 136–145)
POC SODIUM: 131 MMOL/L (ref 136–145)
POC SODIUM: 134 MMOL/L (ref 136–145)
POC SODIUM: 135 MMOL/L (ref 136–145)
POC SODIUM: 136 MMOL/L (ref 136–145)
POC SODIUM: 136 MMOL/L (ref 136–145)
POTASSIUM REFLEX MAGNESIUM: 4.2 MMOL/L (ref 3.5–5.1)
POTASSIUM SERPL-SCNC: 3.7 MMOL/L (ref 3.5–5.1)
POTASSIUM SERPL-SCNC: 3.9 MMOL/L (ref 3.5–5.1)
PROTEIN UA: NEGATIVE MG/DL
PROTHROMBIN TIME: 16 SEC (ref 10–13.2)
PROTHROMBIN TIME: 19.2 SEC (ref 10–13.2)
RBC # BLD: 3.19 M/UL (ref 4–5.2)
RBC # BLD: 3.2 M/UL (ref 4–5.2)
SODIUM BLD-SCNC: 127 MMOL/L (ref 136–145)
SODIUM BLD-SCNC: 131 MMOL/L (ref 136–145)
SPECIFIC GRAVITY UA: 1.02 (ref 1–1.03)
TCO2 ARTERIAL: 22 MMOL/L
TCO2 ARTERIAL: 24 MMOL/L
TCO2 ARTERIAL: 25 MMOL/L
TOTAL PROTEIN: 7.2 G/DL (ref 6.4–8.2)
TRIGL SERPL-MCNC: 78 MG/DL (ref 0–150)
URINE TYPE: NORMAL
UROBILINOGEN, URINE: 0.2 E.U./DL
VLDLC SERPL CALC-MCNC: 16 MG/DL
WBC # BLD: 11.1 K/UL (ref 4–11)
WBC # BLD: 26.5 K/UL (ref 4–11)

## 2020-01-27 PROCEDURE — 2580000003 HC RX 258: Performed by: THORACIC SURGERY (CARDIOTHORACIC VASCULAR SURGERY)

## 2020-01-27 PROCEDURE — 83036 HEMOGLOBIN GLYCOSYLATED A1C: CPT

## 2020-01-27 PROCEDURE — 6360000002 HC RX W HCPCS: Performed by: NURSE PRACTITIONER

## 2020-01-27 PROCEDURE — 2709999900 HC NON-CHARGEABLE SUPPLY: Performed by: THORACIC SURGERY (CARDIOTHORACIC VASCULAR SURGERY)

## 2020-01-27 PROCEDURE — 3600000008 HC SURGERY OHS BASE: Performed by: THORACIC SURGERY (CARDIOTHORACIC VASCULAR SURGERY)

## 2020-01-27 PROCEDURE — 2580000003 HC RX 258: Performed by: NURSE PRACTITIONER

## 2020-01-27 PROCEDURE — 85025 COMPLETE CBC W/AUTO DIFF WBC: CPT

## 2020-01-27 PROCEDURE — 6370000000 HC RX 637 (ALT 250 FOR IP): Performed by: STUDENT IN AN ORGANIZED HEALTH CARE EDUCATION/TRAINING PROGRAM

## 2020-01-27 PROCEDURE — 80061 LIPID PANEL: CPT

## 2020-01-27 PROCEDURE — 2580000003 HC RX 258: Performed by: ANESTHESIOLOGY

## 2020-01-27 PROCEDURE — 6360000002 HC RX W HCPCS: Performed by: ANESTHESIOLOGY

## 2020-01-27 PROCEDURE — 2700000000 HC OXYGEN THERAPY PER DAY

## 2020-01-27 PROCEDURE — 94669 MECHANICAL CHEST WALL OSCILL: CPT

## 2020-01-27 PROCEDURE — 02100Z9 BYPASS CORONARY ARTERY, ONE ARTERY FROM LEFT INTERNAL MAMMARY, OPEN APPROACH: ICD-10-PCS | Performed by: THORACIC SURGERY (CARDIOTHORACIC VASCULAR SURGERY)

## 2020-01-27 PROCEDURE — 6370000000 HC RX 637 (ALT 250 FOR IP): Performed by: THORACIC SURGERY (CARDIOTHORACIC VASCULAR SURGERY)

## 2020-01-27 PROCEDURE — 6370000000 HC RX 637 (ALT 250 FOR IP): Performed by: NURSE PRACTITIONER

## 2020-01-27 PROCEDURE — 85014 HEMATOCRIT: CPT

## 2020-01-27 PROCEDURE — 71045 X-RAY EXAM CHEST 1 VIEW: CPT

## 2020-01-27 PROCEDURE — 33533 CABG ARTERIAL SINGLE: CPT | Performed by: THORACIC SURGERY (CARDIOTHORACIC VASCULAR SURGERY)

## 2020-01-27 PROCEDURE — B24BZZ4 ULTRASONOGRAPHY OF HEART WITH AORTA, TRANSESOPHAGEAL: ICD-10-PCS | Performed by: THORACIC SURGERY (CARDIOTHORACIC VASCULAR SURGERY)

## 2020-01-27 PROCEDURE — 6360000002 HC RX W HCPCS: Performed by: THORACIC SURGERY (CARDIOTHORACIC VASCULAR SURGERY)

## 2020-01-27 PROCEDURE — C1713 ANCHOR/SCREW BN/BN,TIS/BN: HCPCS | Performed by: THORACIC SURGERY (CARDIOTHORACIC VASCULAR SURGERY)

## 2020-01-27 PROCEDURE — 7100000010 HC PHASE II RECOVERY - FIRST 15 MIN

## 2020-01-27 PROCEDURE — 2780000010 HC IMPLANT OTHER: Performed by: THORACIC SURGERY (CARDIOTHORACIC VASCULAR SURGERY)

## 2020-01-27 PROCEDURE — 3600000018 HC SURGERY OHS ADDTL 15MIN: Performed by: THORACIC SURGERY (CARDIOTHORACIC VASCULAR SURGERY)

## 2020-01-27 PROCEDURE — 82330 ASSAY OF CALCIUM: CPT

## 2020-01-27 PROCEDURE — 84132 ASSAY OF SERUM POTASSIUM: CPT

## 2020-01-27 PROCEDURE — 82947 ASSAY GLUCOSE BLOOD QUANT: CPT

## 2020-01-27 PROCEDURE — C1786 PMKR, SINGLE, RATE-RESP: HCPCS | Performed by: THORACIC SURGERY (CARDIOTHORACIC VASCULAR SURGERY)

## 2020-01-27 PROCEDURE — 02HP32Z INSERTION OF MONITORING DEVICE INTO PULMONARY TRUNK, PERCUTANEOUS APPROACH: ICD-10-PCS | Performed by: THORACIC SURGERY (CARDIOTHORACIC VASCULAR SURGERY)

## 2020-01-27 PROCEDURE — 94761 N-INVAS EAR/PLS OXIMETRY MLT: CPT

## 2020-01-27 PROCEDURE — C9290 INJ, BUPIVACAINE LIPOSOME: HCPCS | Performed by: THORACIC SURGERY (CARDIOTHORACIC VASCULAR SURGERY)

## 2020-01-27 PROCEDURE — 94002 VENT MGMT INPAT INIT DAY: CPT

## 2020-01-27 PROCEDURE — 3700000000 HC ANESTHESIA ATTENDED CARE: Performed by: THORACIC SURGERY (CARDIOTHORACIC VASCULAR SURGERY)

## 2020-01-27 PROCEDURE — 85347 COAGULATION TIME ACTIVATED: CPT

## 2020-01-27 PROCEDURE — 5A1221Z PERFORMANCE OF CARDIAC OUTPUT, CONTINUOUS: ICD-10-PCS | Performed by: THORACIC SURGERY (CARDIOTHORACIC VASCULAR SURGERY)

## 2020-01-27 PROCEDURE — 94150 VITAL CAPACITY TEST: CPT

## 2020-01-27 PROCEDURE — C9113 INJ PANTOPRAZOLE SODIUM, VIA: HCPCS | Performed by: THORACIC SURGERY (CARDIOTHORACIC VASCULAR SURGERY)

## 2020-01-27 PROCEDURE — 3700000001 HC ADD 15 MINUTES (ANESTHESIA): Performed by: THORACIC SURGERY (CARDIOTHORACIC VASCULAR SURGERY)

## 2020-01-27 PROCEDURE — 2500000003 HC RX 250 WO HCPCS: Performed by: ANESTHESIOLOGY

## 2020-01-27 PROCEDURE — 85027 COMPLETE CBC AUTOMATED: CPT

## 2020-01-27 PROCEDURE — 83605 ASSAY OF LACTIC ACID: CPT

## 2020-01-27 PROCEDURE — 82803 BLOOD GASES ANY COMBINATION: CPT

## 2020-01-27 PROCEDURE — 85610 PROTHROMBIN TIME: CPT

## 2020-01-27 PROCEDURE — 7100000011 HC PHASE II RECOVERY - ADDTL 15 MIN

## 2020-01-27 PROCEDURE — 83735 ASSAY OF MAGNESIUM: CPT

## 2020-01-27 PROCEDURE — 6370000000 HC RX 637 (ALT 250 FOR IP): Performed by: ANESTHESIOLOGY

## 2020-01-27 PROCEDURE — 2100000000 HC CCU R&B

## 2020-01-27 PROCEDURE — 85730 THROMBOPLASTIN TIME PARTIAL: CPT

## 2020-01-27 PROCEDURE — 84295 ASSAY OF SERUM SODIUM: CPT

## 2020-01-27 PROCEDURE — 80053 COMPREHEN METABOLIC PANEL: CPT

## 2020-01-27 DEVICE — IMPLANTABLE DEVICE: Type: IMPLANTABLE DEVICE | Site: STERNUM | Status: FUNCTIONAL

## 2020-01-27 DEVICE — PLATE BNE 8 H JL SHP STERNALOCK BLU PRI CLSR SYS: Type: IMPLANTABLE DEVICE | Site: STERNUM | Status: FUNCTIONAL

## 2020-01-27 DEVICE — PLATE BNE 8 H X SHP STERNALOCK BLU PRI CLSR SYS: Type: IMPLANTABLE DEVICE | Site: STERNUM | Status: FUNCTIONAL

## 2020-01-27 DEVICE — PLATE BNE 100DEG 4 H L SHP STERNALOCK BLU PRI CLSR SYS: Type: IMPLANTABLE DEVICE | Site: STERNUM | Status: FUNCTIONAL

## 2020-01-27 RX ORDER — BUSPIRONE HYDROCHLORIDE 5 MG/1
30 TABLET ORAL 2 TIMES DAILY
Status: DISCONTINUED | OUTPATIENT
Start: 2020-01-28 | End: 2020-01-31 | Stop reason: HOSPADM

## 2020-01-27 RX ORDER — DIPHENHYDRAMINE HCL 25 MG
25 TABLET ORAL NIGHTLY PRN
Status: DISCONTINUED | OUTPATIENT
Start: 2020-01-28 | End: 2020-01-31 | Stop reason: HOSPADM

## 2020-01-27 RX ORDER — MAGNESIUM SULFATE IN WATER 40 MG/ML
2 INJECTION, SOLUTION INTRAVENOUS PRN
Status: DISCONTINUED | OUTPATIENT
Start: 2020-01-27 | End: 2020-01-31 | Stop reason: HOSPADM

## 2020-01-27 RX ORDER — DEXTROSE MONOHYDRATE 50 MG/ML
100 INJECTION, SOLUTION INTRAVENOUS PRN
Status: DISCONTINUED | OUTPATIENT
Start: 2020-01-27 | End: 2020-01-31 | Stop reason: HOSPADM

## 2020-01-27 RX ORDER — ACETAMINOPHEN 10 MG/ML
1000 INJECTION, SOLUTION INTRAVENOUS ONCE
Status: DISCONTINUED | OUTPATIENT
Start: 2020-01-27 | End: 2020-01-27

## 2020-01-27 RX ORDER — LAMOTRIGINE 100 MG/1
150 TABLET ORAL 2 TIMES DAILY
Status: DISCONTINUED | OUTPATIENT
Start: 2020-01-28 | End: 2020-01-31 | Stop reason: HOSPADM

## 2020-01-27 RX ORDER — SODIUM CHLORIDE 9 MG/ML
INJECTION, SOLUTION INTRAVENOUS CONTINUOUS PRN
Status: DISCONTINUED | OUTPATIENT
Start: 2020-01-27 | End: 2020-01-27 | Stop reason: SDUPTHER

## 2020-01-27 RX ORDER — ACETAMINOPHEN 325 MG/1
650 TABLET ORAL EVERY 4 HOURS PRN
Status: DISCONTINUED | OUTPATIENT
Start: 2020-01-30 | End: 2020-01-31 | Stop reason: HOSPADM

## 2020-01-27 RX ORDER — PANTOPRAZOLE SODIUM 40 MG/10ML
40 INJECTION, POWDER, LYOPHILIZED, FOR SOLUTION INTRAVENOUS DAILY
Status: COMPLETED | OUTPATIENT
Start: 2020-01-27 | End: 2020-01-27

## 2020-01-27 RX ORDER — ASPIRIN 300 MG/1
300 SUPPOSITORY RECTAL DAILY
Status: DISCONTINUED | OUTPATIENT
Start: 2020-01-27 | End: 2020-01-28

## 2020-01-27 RX ORDER — CISATRACURIUM BESYLATE 2 MG/ML
INJECTION, SOLUTION INTRAVENOUS PRN
Status: DISCONTINUED | OUTPATIENT
Start: 2020-01-27 | End: 2020-01-27 | Stop reason: SDUPTHER

## 2020-01-27 RX ORDER — FENTANYL CITRATE 0.05 MG/ML
INJECTION, SOLUTION INTRAMUSCULAR; INTRAVENOUS PRN
Status: DISCONTINUED | OUTPATIENT
Start: 2020-01-27 | End: 2020-01-27 | Stop reason: SDUPTHER

## 2020-01-27 RX ORDER — DOBUTAMINE 12.5 MG/ML
INJECTION, SOLUTION INTRAVENOUS CONTINUOUS PRN
Status: DISCONTINUED | OUTPATIENT
Start: 2020-01-27 | End: 2020-01-27 | Stop reason: SDUPTHER

## 2020-01-27 RX ORDER — OXYCODONE HYDROCHLORIDE 5 MG/1
10 TABLET ORAL EVERY 4 HOURS PRN
Status: DISCONTINUED | OUTPATIENT
Start: 2020-01-27 | End: 2020-01-31 | Stop reason: HOSPADM

## 2020-01-27 RX ORDER — ACETAMINOPHEN 500 MG
1000 TABLET ORAL EVERY 8 HOURS
Status: COMPLETED | OUTPATIENT
Start: 2020-01-28 | End: 2020-01-29

## 2020-01-27 RX ORDER — NICOTINE POLACRILEX 4 MG
15 LOZENGE BUCCAL PRN
Status: DISCONTINUED | OUTPATIENT
Start: 2020-01-27 | End: 2020-01-31 | Stop reason: HOSPADM

## 2020-01-27 RX ORDER — SODIUM CHLORIDE 9 MG/ML
10 INJECTION INTRAVENOUS DAILY
Status: COMPLETED | OUTPATIENT
Start: 2020-01-27 | End: 2020-01-27

## 2020-01-27 RX ORDER — OXYCODONE HYDROCHLORIDE 5 MG/1
5 TABLET ORAL EVERY 4 HOURS PRN
Status: DISCONTINUED | OUTPATIENT
Start: 2020-01-27 | End: 2020-01-31 | Stop reason: HOSPADM

## 2020-01-27 RX ORDER — NOREPINEPHRINE BITARTRATE 1 MG/ML
INJECTION, SOLUTION INTRAVENOUS PRN
Status: DISCONTINUED | OUTPATIENT
Start: 2020-01-27 | End: 2020-01-27 | Stop reason: SDUPTHER

## 2020-01-27 RX ORDER — MIDAZOLAM HYDROCHLORIDE 1 MG/ML
1 INJECTION INTRAMUSCULAR; INTRAVENOUS
Status: DISCONTINUED | OUTPATIENT
Start: 2020-01-27 | End: 2020-01-29

## 2020-01-27 RX ORDER — HEPARIN SODIUM 5000 [USP'U]/ML
5000 INJECTION, SOLUTION INTRAVENOUS; SUBCUTANEOUS EVERY 8 HOURS SCHEDULED
Status: DISCONTINUED | OUTPATIENT
Start: 2020-01-28 | End: 2020-01-31 | Stop reason: HOSPADM

## 2020-01-27 RX ORDER — MORPHINE SULFATE 4 MG/ML
4 INJECTION, SOLUTION INTRAMUSCULAR; INTRAVENOUS
Status: DISCONTINUED | OUTPATIENT
Start: 2020-01-27 | End: 2020-01-31 | Stop reason: HOSPADM

## 2020-01-27 RX ORDER — DEXTROSE MONOHYDRATE 25 G/50ML
12.5 INJECTION, SOLUTION INTRAVENOUS PRN
Status: DISCONTINUED | OUTPATIENT
Start: 2020-01-27 | End: 2020-01-31 | Stop reason: HOSPADM

## 2020-01-27 RX ORDER — NITROGLYCERIN 40 MG/1
1 PATCH TRANSDERMAL DAILY
Status: COMPLETED | OUTPATIENT
Start: 2020-01-28 | End: 2020-01-30

## 2020-01-27 RX ORDER — SODIUM CHLORIDE, SODIUM LACTATE, POTASSIUM CHLORIDE, CALCIUM CHLORIDE 600; 310; 30; 20 MG/100ML; MG/100ML; MG/100ML; MG/100ML
INJECTION, SOLUTION INTRAVENOUS CONTINUOUS
Status: DISCONTINUED | OUTPATIENT
Start: 2020-01-27 | End: 2020-01-27

## 2020-01-27 RX ORDER — PROTAMINE SULFATE 10 MG/ML
50 INJECTION, SOLUTION INTRAVENOUS
Status: ACTIVE | OUTPATIENT
Start: 2020-01-27 | End: 2020-01-27

## 2020-01-27 RX ORDER — ALBUMIN, HUMAN INJ 5% 5 %
25 SOLUTION INTRAVENOUS PRN
Status: DISCONTINUED | OUTPATIENT
Start: 2020-01-27 | End: 2020-01-29

## 2020-01-27 RX ORDER — DOBUTAMINE HYDROCHLORIDE 200 MG/100ML
2 INJECTION INTRAVENOUS CONTINUOUS PRN
Status: DISCONTINUED | OUTPATIENT
Start: 2020-01-27 | End: 2020-01-29

## 2020-01-27 RX ORDER — CHOLECALCIFEROL (VITAMIN D3) 125 MCG
5 CAPSULE ORAL NIGHTLY PRN
Status: DISCONTINUED | OUTPATIENT
Start: 2020-01-28 | End: 2020-01-31 | Stop reason: HOSPADM

## 2020-01-27 RX ORDER — MIDAZOLAM HYDROCHLORIDE 5 MG/ML
2 INJECTION INTRAMUSCULAR; INTRAVENOUS ONCE
Status: COMPLETED | OUTPATIENT
Start: 2020-01-27 | End: 2020-01-27

## 2020-01-27 RX ORDER — SODIUM CHLORIDE 0.9 % (FLUSH) 0.9 %
10 SYRINGE (ML) INJECTION EVERY 12 HOURS SCHEDULED
Status: DISCONTINUED | OUTPATIENT
Start: 2020-01-27 | End: 2020-01-31 | Stop reason: HOSPADM

## 2020-01-27 RX ORDER — POTASSIUM CHLORIDE 750 MG/1
10 TABLET, EXTENDED RELEASE ORAL
Status: DISCONTINUED | OUTPATIENT
Start: 2020-01-28 | End: 2020-01-28

## 2020-01-27 RX ORDER — FAMOTIDINE 20 MG/1
20 TABLET, FILM COATED ORAL DAILY
Status: DISCONTINUED | OUTPATIENT
Start: 2020-01-28 | End: 2020-01-31 | Stop reason: HOSPADM

## 2020-01-27 RX ORDER — MIDAZOLAM HYDROCHLORIDE 1 MG/ML
INJECTION INTRAMUSCULAR; INTRAVENOUS PRN
Status: DISCONTINUED | OUTPATIENT
Start: 2020-01-27 | End: 2020-01-27 | Stop reason: SDUPTHER

## 2020-01-27 RX ORDER — MEPERIDINE HYDROCHLORIDE 50 MG/ML
25 INJECTION INTRAMUSCULAR; INTRAVENOUS; SUBCUTANEOUS
Status: ACTIVE | OUTPATIENT
Start: 2020-01-27 | End: 2020-01-27

## 2020-01-27 RX ORDER — DOCUSATE SODIUM 100 MG/1
100 CAPSULE, LIQUID FILLED ORAL 2 TIMES DAILY
Status: DISCONTINUED | OUTPATIENT
Start: 2020-01-28 | End: 2020-01-31 | Stop reason: HOSPADM

## 2020-01-27 RX ORDER — MORPHINE SULFATE 2 MG/ML
2 INJECTION, SOLUTION INTRAMUSCULAR; INTRAVENOUS
Status: DISCONTINUED | OUTPATIENT
Start: 2020-01-27 | End: 2020-01-31 | Stop reason: HOSPADM

## 2020-01-27 RX ORDER — PROTAMINE SULFATE 10 MG/ML
INJECTION, SOLUTION INTRAVENOUS PRN
Status: DISCONTINUED | OUTPATIENT
Start: 2020-01-27 | End: 2020-01-27 | Stop reason: SDUPTHER

## 2020-01-27 RX ORDER — NITROGLYCERIN 20 MG/100ML
10 INJECTION INTRAVENOUS CONTINUOUS PRN
Status: DISCONTINUED | OUTPATIENT
Start: 2020-01-27 | End: 2020-01-29

## 2020-01-27 RX ORDER — SACCHAROMYCES BOULARDII 250 MG
250 CAPSULE ORAL 2 TIMES DAILY
Status: DISCONTINUED | OUTPATIENT
Start: 2020-01-28 | End: 2020-01-31 | Stop reason: HOSPADM

## 2020-01-27 RX ORDER — INSULIN GLARGINE 100 [IU]/ML
0.15 INJECTION, SOLUTION SUBCUTANEOUS NIGHTLY
Status: DISCONTINUED | OUTPATIENT
Start: 2020-01-28 | End: 2020-01-31

## 2020-01-27 RX ORDER — ETOMIDATE 2 MG/ML
INJECTION, SOLUTION INTRAVENOUS PRN
Status: DISCONTINUED | OUTPATIENT
Start: 2020-01-27 | End: 2020-01-27 | Stop reason: SDUPTHER

## 2020-01-27 RX ORDER — ONDANSETRON 2 MG/ML
4 INJECTION INTRAMUSCULAR; INTRAVENOUS EVERY 8 HOURS PRN
Status: DISCONTINUED | OUTPATIENT
Start: 2020-01-27 | End: 2020-01-31 | Stop reason: HOSPADM

## 2020-01-27 RX ORDER — SODIUM CHLORIDE, SODIUM LACTATE, POTASSIUM CHLORIDE, AND CALCIUM CHLORIDE .6; .31; .03; .02 G/100ML; G/100ML; G/100ML; G/100ML
250 INJECTION, SOLUTION INTRAVENOUS CONTINUOUS PRN
Status: DISCONTINUED | OUTPATIENT
Start: 2020-01-27 | End: 2020-01-29

## 2020-01-27 RX ORDER — POTASSIUM CHLORIDE 29.8 MG/ML
20 INJECTION INTRAVENOUS PRN
Status: DISCONTINUED | OUTPATIENT
Start: 2020-01-27 | End: 2020-01-31 | Stop reason: HOSPADM

## 2020-01-27 RX ORDER — CHLORHEXIDINE GLUCONATE 0.12 MG/ML
15 RINSE ORAL 2 TIMES DAILY
Status: DISCONTINUED | OUTPATIENT
Start: 2020-01-27 | End: 2020-01-31 | Stop reason: HOSPADM

## 2020-01-27 RX ORDER — ALBUTEROL SULFATE 90 UG/1
2 AEROSOL, METERED RESPIRATORY (INHALATION) EVERY 6 HOURS PRN
Status: DISCONTINUED | OUTPATIENT
Start: 2020-01-27 | End: 2020-01-31 | Stop reason: HOSPADM

## 2020-01-27 RX ORDER — FUROSEMIDE 10 MG/ML
40 INJECTION INTRAMUSCULAR; INTRAVENOUS
Status: ACTIVE | OUTPATIENT
Start: 2020-01-27 | End: 2020-01-27

## 2020-01-27 RX ORDER — ATORVASTATIN CALCIUM 80 MG/1
80 TABLET, FILM COATED ORAL NIGHTLY
Status: DISCONTINUED | OUTPATIENT
Start: 2020-01-28 | End: 2020-01-31 | Stop reason: HOSPADM

## 2020-01-27 RX ORDER — SODIUM CHLORIDE 0.9 % (FLUSH) 0.9 %
10 SYRINGE (ML) INJECTION PRN
Status: DISCONTINUED | OUTPATIENT
Start: 2020-01-27 | End: 2020-01-31 | Stop reason: HOSPADM

## 2020-01-27 RX ORDER — ACETAMINOPHEN 10 MG/ML
1000 INJECTION, SOLUTION INTRAVENOUS EVERY 8 HOURS
Status: COMPLETED | OUTPATIENT
Start: 2020-01-27 | End: 2020-01-28

## 2020-01-27 RX ADMIN — FENTANYL CITRATE 250 MCG: 50 INJECTION, SOLUTION INTRAMUSCULAR; INTRAVENOUS at 07:32

## 2020-01-27 RX ADMIN — MIDAZOLAM HYDROCHLORIDE 3 MG: 2 INJECTION, SOLUTION INTRAMUSCULAR; INTRAVENOUS at 07:32

## 2020-01-27 RX ADMIN — CHLORHEXIDINE GLUCONATE: 213 SOLUTION TOPICAL at 04:58

## 2020-01-27 RX ADMIN — MIDAZOLAM HYDROCHLORIDE 1 MG: 2 INJECTION, SOLUTION INTRAMUSCULAR; INTRAVENOUS at 10:46

## 2020-01-27 RX ADMIN — PROTAMINE SULFATE 400 MG: 10 INJECTION, SOLUTION INTRAVENOUS at 11:10

## 2020-01-27 RX ADMIN — CISATRACURIUM BESYLATE 10 MG: 2 INJECTION INTRAVENOUS at 08:50

## 2020-01-27 RX ADMIN — ETOMIDATE 20 MG: 2 INJECTION INTRAVENOUS at 07:32

## 2020-01-27 RX ADMIN — MORPHINE SULFATE 2 MG: 2 INJECTION, SOLUTION INTRAMUSCULAR; INTRAVENOUS at 20:28

## 2020-01-27 RX ADMIN — CISATRACURIUM BESYLATE 10 MG: 2 INJECTION INTRAVENOUS at 10:46

## 2020-01-27 RX ADMIN — PANTOPRAZOLE SODIUM 40 MG: 40 INJECTION, POWDER, FOR SOLUTION INTRAVENOUS at 13:57

## 2020-01-27 RX ADMIN — HYDROXYZINE HYDROCHLORIDE 10 MG: 10 TABLET, FILM COATED ORAL at 04:32

## 2020-01-27 RX ADMIN — FENTANYL CITRATE 100 MCG: 50 INJECTION, SOLUTION INTRAMUSCULAR; INTRAVENOUS at 08:50

## 2020-01-27 RX ADMIN — VANCOMYCIN HYDROCHLORIDE 1.5 G: 10 INJECTION, POWDER, LYOPHILIZED, FOR SOLUTION INTRAVENOUS at 11:57

## 2020-01-27 RX ADMIN — CISATRACURIUM BESYLATE 10 MG: 2 INJECTION INTRAVENOUS at 09:52

## 2020-01-27 RX ADMIN — MIDAZOLAM HYDROCHLORIDE 1 MG: 2 INJECTION, SOLUTION INTRAMUSCULAR; INTRAVENOUS at 11:34

## 2020-01-27 RX ADMIN — MUPIROCIN: 20 OINTMENT TOPICAL at 20:28

## 2020-01-27 RX ADMIN — ACETAMINOPHEN 1000 MG: 10 INJECTION, SOLUTION INTRAVENOUS at 14:25

## 2020-01-27 RX ADMIN — Medication 15 ML: at 20:28

## 2020-01-27 RX ADMIN — POTASSIUM CHLORIDE 20 MEQ: 29.8 INJECTION, SOLUTION INTRAVENOUS at 14:54

## 2020-01-27 RX ADMIN — Medication 10 ML: at 17:53

## 2020-01-27 RX ADMIN — CEFAZOLIN SODIUM 2 G: 10 INJECTION, POWDER, FOR SOLUTION INTRAVENOUS at 07:15

## 2020-01-27 RX ADMIN — POTASSIUM CHLORIDE 20 MEQ: 29.8 INJECTION, SOLUTION INTRAVENOUS at 19:43

## 2020-01-27 RX ADMIN — ACETAMINOPHEN 1000 MG: 10 INJECTION, SOLUTION INTRAVENOUS at 20:28

## 2020-01-27 RX ADMIN — SODIUM CHLORIDE: 9 INJECTION, SOLUTION INTRAVENOUS at 04:32

## 2020-01-27 RX ADMIN — METOPROLOL TARTRATE 12.5 MG: 25 TABLET ORAL at 04:32

## 2020-01-27 RX ADMIN — Medication 10 ML: at 20:29

## 2020-01-27 RX ADMIN — SODIUM CHLORIDE 2 UNITS/HR: 9 INJECTION, SOLUTION INTRAVENOUS at 10:51

## 2020-01-27 RX ADMIN — MORPHINE SULFATE 2 MG: 2 INJECTION, SOLUTION INTRAMUSCULAR; INTRAVENOUS at 23:44

## 2020-01-27 RX ADMIN — POTASSIUM CHLORIDE 20 MEQ: 29.8 INJECTION, SOLUTION INTRAVENOUS at 18:43

## 2020-01-27 RX ADMIN — MIDAZOLAM HYDROCHLORIDE 2 MG: 5 INJECTION, SOLUTION INTRAMUSCULAR; INTRAVENOUS at 05:59

## 2020-01-27 RX ADMIN — SODIUM CHLORIDE: 9 INJECTION, SOLUTION INTRAVENOUS at 07:32

## 2020-01-27 RX ADMIN — FUROSEMIDE 5 MG/HR: 10 INJECTION, SOLUTION INTRAMUSCULAR; INTRAVENOUS at 17:59

## 2020-01-27 RX ADMIN — FUROSEMIDE 10 MG/HR: 10 INJECTION, SOLUTION INTRAMUSCULAR; INTRAVENOUS at 09:19

## 2020-01-27 RX ADMIN — PROTAMINE SULFATE 50 MG: 10 INJECTION, SOLUTION INTRAVENOUS at 11:21

## 2020-01-27 RX ADMIN — CHLORHEXIDINE GLUCONATE 15 ML: 1.2 RINSE ORAL at 04:57

## 2020-01-27 RX ADMIN — NOREPINEPHRINE BITARTRATE 4 MCG: 1 INJECTION INTRAVENOUS at 11:01

## 2020-01-27 RX ADMIN — ASPIRIN 81 MG: 81 TABLET, COATED ORAL at 04:32

## 2020-01-27 RX ADMIN — POTASSIUM CHLORIDE 20 MEQ: 29.8 INJECTION, SOLUTION INTRAVENOUS at 13:53

## 2020-01-27 RX ADMIN — CEFAZOLIN SODIUM 2 G: 10 INJECTION, POWDER, FOR SOLUTION INTRAVENOUS at 17:57

## 2020-01-27 RX ADMIN — MORPHINE SULFATE 2 MG: 2 INJECTION, SOLUTION INTRAMUSCULAR; INTRAVENOUS at 17:53

## 2020-01-27 RX ADMIN — ASPIRIN 325 MG: 325 TABLET, COATED ORAL at 18:21

## 2020-01-27 RX ADMIN — CISATRACURIUM BESYLATE 20 MG: 2 INJECTION INTRAVENOUS at 07:32

## 2020-01-27 RX ADMIN — DOBUTAMINE 2 MCG/KG/MIN: 12.5 INJECTION, SOLUTION, CONCENTRATE INTRAVENOUS at 08:37

## 2020-01-27 RX ADMIN — Medication 10 ML: at 13:57

## 2020-01-27 RX ADMIN — CEFAZOLIN SODIUM 1 G: 10 INJECTION, POWDER, FOR SOLUTION INTRAVENOUS at 10:15

## 2020-01-27 RX ADMIN — FENTANYL CITRATE 150 MCG: 50 INJECTION, SOLUTION INTRAMUSCULAR; INTRAVENOUS at 11:30

## 2020-01-27 ASSESSMENT — PULMONARY FUNCTION TESTS
PIF_VALUE: 0
PIF_VALUE: 33
PIF_VALUE: 0
PIF_VALUE: 31
PIF_VALUE: 0
PIF_VALUE: 29
PIF_VALUE: 31
PIF_VALUE: 28
PIF_VALUE: 27
PIF_VALUE: 29
PIF_VALUE: 28
PIF_VALUE: 0
PIF_VALUE: 0
PIF_VALUE: 4
PIF_VALUE: 1
PIF_VALUE: 33
PIF_VALUE: 31
PIF_VALUE: 1
PIF_VALUE: 32
PIF_VALUE: 31
PIF_VALUE: 33
PIF_VALUE: 30
PIF_VALUE: 31
PIF_VALUE: 36
PIF_VALUE: 30
PIF_VALUE: 29
PIF_VALUE: 1
PIF_VALUE: 31
PIF_VALUE: 0
PIF_VALUE: 1
PIF_VALUE: 11
PIF_VALUE: 31
PIF_VALUE: 1
PIF_VALUE: 32
PIF_VALUE: 32
PIF_VALUE: 1
PIF_VALUE: 29
PIF_VALUE: 28
PIF_VALUE: 1
PIF_VALUE: 29
PIF_VALUE: 32
PIF_VALUE: 30
PIF_VALUE: 1
PIF_VALUE: 4
PIF_VALUE: 1
PIF_VALUE: 1
PIF_VALUE: 4
PIF_VALUE: 29
PIF_VALUE: 0
PIF_VALUE: 31
PIF_VALUE: 40
PIF_VALUE: 31
PIF_VALUE: 33
PIF_VALUE: 3
PIF_VALUE: 32
PIF_VALUE: 31
PIF_VALUE: 32
PIF_VALUE: 4
PIF_VALUE: 33
PIF_VALUE: 0
PIF_VALUE: 33
PIF_VALUE: 30
PIF_VALUE: 30
PIF_VALUE: 31
PIF_VALUE: 32
PIF_VALUE: 32
PIF_VALUE: 31
PIF_VALUE: 31
PIF_VALUE: 32
PIF_VALUE: 4
PIF_VALUE: 32
PIF_VALUE: 11
PIF_VALUE: 31
PIF_VALUE: 1
PIF_VALUE: 4
PIF_VALUE: 32
PIF_VALUE: 0
PIF_VALUE: 30
PIF_VALUE: 29
PIF_VALUE: 1
PIF_VALUE: 31
PIF_VALUE: 30
PIF_VALUE: 30
PIF_VALUE: 32
PIF_VALUE: 29
PIF_VALUE: 0
PIF_VALUE: 32
PIF_VALUE: 30
PIF_VALUE: 31
PIF_VALUE: 30
PIF_VALUE: 1
PIF_VALUE: 28
PIF_VALUE: 3
PIF_VALUE: 32
PIF_VALUE: 0
PIF_VALUE: 38
PIF_VALUE: 29
PIF_VALUE: 29
PIF_VALUE: 30
PIF_VALUE: 29
PIF_VALUE: 33
PIF_VALUE: 32
PIF_VALUE: 33
PIF_VALUE: 1
PIF_VALUE: 33
PIF_VALUE: 0
PIF_VALUE: 32
PIF_VALUE: 1
PIF_VALUE: 0
PIF_VALUE: 31
PIF_VALUE: 4
PIF_VALUE: 31
PIF_VALUE: 30
PIF_VALUE: 4
PIF_VALUE: 31
PIF_VALUE: 34
PIF_VALUE: 0
PIF_VALUE: 29
PIF_VALUE: 31
PIF_VALUE: 33
PIF_VALUE: 0
PIF_VALUE: 0
PIF_VALUE: 40
PIF_VALUE: 29
PIF_VALUE: 28
PIF_VALUE: 31
PIF_VALUE: 2
PIF_VALUE: 31
PIF_VALUE: 1
PIF_VALUE: 1
PIF_VALUE: 29
PIF_VALUE: 32
PIF_VALUE: 30
PIF_VALUE: 4
PIF_VALUE: 32
PIF_VALUE: 31
PIF_VALUE: 30
PIF_VALUE: 32
PIF_VALUE: 28
PIF_VALUE: 0
PIF_VALUE: 4
PIF_VALUE: 33
PIF_VALUE: 34
PIF_VALUE: 11
PIF_VALUE: 32
PIF_VALUE: 32
PIF_VALUE: 30
PIF_VALUE: 31
PIF_VALUE: 1
PIF_VALUE: 28
PIF_VALUE: 35
PIF_VALUE: 31
PIF_VALUE: 35
PIF_VALUE: 30
PIF_VALUE: 33
PIF_VALUE: 29
PIF_VALUE: 31
PIF_VALUE: 28
PIF_VALUE: 4
PIF_VALUE: 31
PIF_VALUE: 30
PIF_VALUE: 30
PIF_VALUE: 32
PIF_VALUE: 0
PIF_VALUE: 31
PIF_VALUE: 1
PIF_VALUE: 33
PIF_VALUE: 30
PIF_VALUE: 0
PIF_VALUE: 1
PIF_VALUE: 31
PIF_VALUE: 29
PIF_VALUE: 0
PIF_VALUE: 31
PIF_VALUE: 34
PIF_VALUE: 28
PIF_VALUE: 31
PIF_VALUE: 1
PIF_VALUE: 1
PIF_VALUE: 31
PIF_VALUE: 31
PIF_VALUE: 32
PIF_VALUE: 32
PIF_VALUE: 30
PIF_VALUE: 31
PIF_VALUE: 35
PIF_VALUE: 32
PIF_VALUE: 31
PIF_VALUE: 32
PIF_VALUE: 9
PIF_VALUE: 31
PIF_VALUE: 33
PIF_VALUE: 32
PIF_VALUE: 3
PIF_VALUE: 29
PIF_VALUE: 11
PIF_VALUE: 33
PIF_VALUE: 1
PIF_VALUE: 29
PIF_VALUE: 32
PIF_VALUE: 31
PIF_VALUE: 30
PIF_VALUE: 33
PIF_VALUE: 4
PIF_VALUE: 0
PIF_VALUE: 30
PIF_VALUE: 1
PIF_VALUE: 0
PIF_VALUE: 32
PIF_VALUE: 33
PIF_VALUE: 31
PIF_VALUE: 34
PIF_VALUE: 39
PIF_VALUE: 31
PIF_VALUE: 30
PIF_VALUE: 1
PIF_VALUE: 31
PIF_VALUE: 32
PIF_VALUE: 31
PIF_VALUE: 30
PIF_VALUE: 0
PIF_VALUE: 34
PIF_VALUE: 2
PIF_VALUE: 30
PIF_VALUE: 38
PIF_VALUE: 0
PIF_VALUE: 32
PIF_VALUE: 30
PIF_VALUE: 31
PIF_VALUE: 28
PIF_VALUE: 34
PIF_VALUE: 1
PIF_VALUE: 32
PIF_VALUE: 31
PIF_VALUE: 32
PIF_VALUE: 41
PIF_VALUE: 30
PIF_VALUE: 32
PIF_VALUE: 34
PIF_VALUE: 32
PIF_VALUE: 34
PIF_VALUE: 1
PIF_VALUE: 4
PIF_VALUE: 4
PIF_VALUE: 33
PIF_VALUE: 29
PIF_VALUE: 31
PIF_VALUE: 29
PIF_VALUE: 30
PIF_VALUE: 30
PIF_VALUE: 35
PIF_VALUE: 29
PIF_VALUE: 1
PIF_VALUE: 28
PIF_VALUE: 31
PIF_VALUE: 1
PIF_VALUE: 4
PIF_VALUE: 2
PIF_VALUE: 30
PIF_VALUE: 29
PIF_VALUE: 28
PIF_VALUE: 33
PIF_VALUE: 31
PIF_VALUE: 0
PIF_VALUE: 30
PIF_VALUE: 1
PIF_VALUE: 29
PIF_VALUE: 31
PIF_VALUE: 32
PIF_VALUE: 0
PIF_VALUE: 1
PIF_VALUE: 35
PIF_VALUE: 1
PIF_VALUE: 32
PIF_VALUE: 28
PIF_VALUE: 1
PIF_VALUE: 29
PIF_VALUE: 32
PIF_VALUE: 1
PIF_VALUE: 1
PIF_VALUE: 31
PIF_VALUE: 31
PIF_VALUE: 1
PIF_VALUE: 31
PIF_VALUE: 32
PIF_VALUE: 32
PIF_VALUE: 33
PIF_VALUE: 31
PIF_VALUE: 33
PIF_VALUE: 31
PIF_VALUE: 1
PIF_VALUE: 28
PIF_VALUE: 0
PIF_VALUE: 32
PIF_VALUE: 1
PIF_VALUE: 31
PIF_VALUE: 37
PIF_VALUE: 31
PIF_VALUE: 31
PIF_VALUE: 4
PIF_VALUE: 1
PIF_VALUE: 32
PIF_VALUE: 35
PIF_VALUE: 1
PIF_VALUE: 30
PIF_VALUE: 30
PIF_VALUE: 28
PIF_VALUE: 31
PIF_VALUE: 1
PIF_VALUE: 31
PIF_VALUE: 0
PIF_VALUE: 26
PIF_VALUE: 0
PIF_VALUE: 30
PIF_VALUE: 4
PIF_VALUE: 30
PIF_VALUE: 31
PIF_VALUE: 28
PIF_VALUE: 1
PIF_VALUE: 1
PIF_VALUE: 29
PIF_VALUE: 28
PIF_VALUE: 28
PIF_VALUE: 1
PIF_VALUE: 28
PIF_VALUE: 32
PIF_VALUE: 15
PIF_VALUE: 32
PIF_VALUE: 29
PIF_VALUE: 31
PIF_VALUE: 30
PIF_VALUE: 31
PIF_VALUE: 28
PIF_VALUE: 0
PIF_VALUE: 32
PIF_VALUE: 29
PIF_VALUE: 29
PIF_VALUE: 28
PIF_VALUE: 28
PIF_VALUE: 1
PIF_VALUE: 28
PIF_VALUE: 31
PIF_VALUE: 32
PIF_VALUE: 36
PIF_VALUE: 35
PIF_VALUE: 4
PIF_VALUE: 30
PIF_VALUE: 32
PIF_VALUE: 31
PIF_VALUE: 31
PIF_VALUE: 1
PIF_VALUE: 31
PIF_VALUE: 31
PIF_VALUE: 29
PIF_VALUE: 28

## 2020-01-27 ASSESSMENT — PAIN DESCRIPTION - DESCRIPTORS: DESCRIPTORS: ACHING;CONSTANT

## 2020-01-27 ASSESSMENT — PAIN DESCRIPTION - PAIN TYPE: TYPE: SURGICAL PAIN

## 2020-01-27 ASSESSMENT — PAIN DESCRIPTION - FREQUENCY: FREQUENCY: CONTINUOUS

## 2020-01-27 ASSESSMENT — PAIN SCALES - GENERAL
PAINLEVEL_OUTOF10: 6
PAINLEVEL_OUTOF10: 6
PAINLEVEL_OUTOF10: 0
PAINLEVEL_OUTOF10: 0
PAINLEVEL_OUTOF10: 6

## 2020-01-27 ASSESSMENT — PAIN SCALES - WONG BAKER

## 2020-01-27 ASSESSMENT — PAIN DESCRIPTION - ONSET: ONSET: AWAKENED FROM SLEEP

## 2020-01-27 ASSESSMENT — PAIN DESCRIPTION - ORIENTATION: ORIENTATION: MID

## 2020-01-27 ASSESSMENT — PAIN DESCRIPTION - LOCATION: LOCATION: CHEST

## 2020-01-27 ASSESSMENT — PAIN DESCRIPTION - PROGRESSION: CLINICAL_PROGRESSION: NOT CHANGED

## 2020-01-27 NOTE — PROGRESS NOTES
Inpatient Family Medicine Progress Note      PCP: Patti Salgado PA-C    Date of Admission: 1/17/2020    Chief Complaint:  Chest pain    Hospital Course: 62 y.o. female who presented to [facility] with strong chest pain that started around 0930 on 01/17. Started when she was getting ready to go to see Dr Inocencia Boothe. Felt like something sitting on her chest. Pain was 10/10, associated with shortness of breath and nausea. Took 2 Nitro at home which helped. Admitted to some pain with breathing and when walking x 2 days as well as some back pain. Has a history of previous MI, but she does not remember her previous MI. Denies any recent injuries but admits to falling last week but there was no pain at that time. Has had 3 MIs with 6 stents. Has had CardioMEMs. Was recently admitted with acute on chronic kidney disease, as well as ulcers on both feet. While here in the hospital, patient had cardiac cath that showed 4 vessel disease, CABG x1 performed, patient moved to ICU for further care. Subjective: At time of examination, patient was recovering from surgery. Patient already extubated and on 3 L NC. Patient was only slightly responsive, and thus unable to ask any questions or obtain ROS.     Medications:  Reviewed    Infusion Medications    sodium chloride      dextrose       Scheduled Medications    silver sulfADIAZINE   Topical Daily    sodium chloride flush  10 mL Intravenous 2 times per day    acetylcysteine  1,200 mg Oral BID    insulin glargine  5 Units Subcutaneous Nightly    buprenorphine-naloxone  1 Film Sublingual Q12H    lamoTRIgine  150 mg Oral BID    ARIPiprazole  10 mg Oral Daily    aspirin EC  81 mg Oral Daily    atorvastatin  80 mg Oral Nightly    busPIRone  30 mg Oral BID    magnesium oxide  400 mg Oral Daily    pantoprazole  40 mg Oral BID    vitamin D  50,000 Units Oral Weekly    [Held by provider] carvedilol  3.125 mg Oral BID    benztropine  1 mg Oral Daily    saccharomyces boulardii  250 mg Oral BID    sodium chloride flush  10 mL Intravenous 2 times per day    tiotropium  18 mcg Inhalation Daily    insulin lispro  0-12 Units Subcutaneous TID WC    insulin lispro  0-6 Units Subcutaneous Nightly    nicotine  1 patch Transdermal Daily     PRN Meds: melatonin, sodium chloride flush, acetaminophen, nitroGLYCERIN, sodium chloride flush, magnesium hydroxide, ondansetron, glucose, dextrose, glucagon (rDNA), dextrose, albuterol sulfate HFA, acetaminophen      Intake/Output Summary (Last 24 hours) at 1/20/2020 1823  Last data filed at 1/20/2020 1235  Gross per 24 hour   Intake --   Output 2375 ml   Net -2375 ml       Physical Exam Performed:    /75   Pulse 85   Temp 98.4 °F (36.9 °C) (Oral)   Resp 16   Ht 5' 4.5\" (1.638 m)   Wt 241 lb 11.2 oz (109.6 kg)   LMP 10/24/2012   SpO2 98%   BMI 40.85 kg/m²     Physical Exam  Vitals signs reviewed. Constitutional:       General: She is not in acute distress. Appearance: Normal appearance. She is obese. She is not ill-appearing. Comments: Still recovering from surgery with only slight responsiveness. HENT:      Head: Normocephalic and atraumatic. Cardiovascular:      Rate and Rhythm: Normal rate and regular rhythm. Heart sounds: No murmur. No friction rub. No gallop. Pulmonary:      Effort: Pulmonary effort is normal.      Breath sounds: No wheezing, rhonchi or rales. Abdominal:      General: There is no distension. Palpations: Abdomen is soft. Musculoskeletal:      Right lower leg: Edema present. Left lower leg: Edema present. Skin:     General: Skin is warm and dry. Neurological:      Comments: Recovering from anesthesia and open heart surgery.          Labs:   Recent Labs     01/18/20  0842 01/19/20  0640 01/20/20  0748   WBC 9.4 10.7 13.0*   HGB 10.0* 10.0* 9.9*   HCT 30.2* 29.8* 30.6*    404 402     Recent Labs     01/19/20  0640 01/20/20  0209 01/20/20  0748   * 123* 125*   K 3.8 4.3 4.5   CL 85* 81* 84*   CO2 29 29 27   BUN 53* 51* 49*   CREATININE 2.0* 2.1* 1.9*   CALCIUM 9.2 9.4 9.6     Recent Labs     01/18/20  0842 01/19/20  0640 01/20/20  0748   AST 11* 9* 6*   ALT <5* 7* 6*   BILITOT 0.4 0.4 0.5   ALKPHOS 97 96 95     No results for input(s): INR in the last 72 hours. Recent Labs     01/17/20 2037   TROPONINI 0.01       Urinalysis:      Lab Results   Component Value Date    NITRU Negative 01/17/2020    WBCUA 3-5 09/28/2019    BACTERIA Rare 09/28/2019    RBCUA None seen 09/28/2019    BLOODU Negative 01/17/2020    SPECGRAV 1.010 01/17/2020    GLUCOSEU Negative 01/17/2020       Radiology:  XR CHEST STANDARD (2 VW)   Final Result   Cardiomegaly without evidence of acute cardiopulmonary process. Transthoracic Echo (1/22/2020)  Technically difficult examination secondary to habitus. The left ventricular systolic function is severely reduced with an ejection fraction of 25%. There is severe hypokinesis of the anterior and apical walls consistent with infarction within the territory of the left anterior descending artery. Grade II diastolic dysfunction with elevated left ventricular filling pressure. Moderate to severe mitral regurgitation. Mild tricuspid regurgitation. Is estimated at 53 mmHg consistent with moderate pulmonary hypertension (Right atrial pressure of 3 mmHg). If clinically necessary, consider URI for better evaluation of mitral regurgitation. Transesophageal Echo (1/24/2020)  Ejection fraction is visually estimated at 35-40%. Moderate to severe mitral regurgitation with multiple jets visualized. ERO estimated at 0.29 cm2. Mild tricuspid regurgitation. The left atrial appendage shows evidence of thrombus formation and low flow velocities.   Moderate amount of plaque in the aorta    Assessment/Plan:    Active Hospital Problems    Diagnosis Date Noted    CAD (coronary artery disease) [I25.10]      Priority: High    Peripheral edema

## 2020-01-27 NOTE — OP NOTE
axillobrachial bypass with saphenous vein. She has chronic kidney  disease, hypertension, insulin-dependent diabetes. She has had PCI on  multiple occasions. A total of six stents placed in her proximal  circumflex and LAD. She presented on this occasion with recurrent  substernal chest pressure consistent with angina. EKG and enzymes  changes consistent with unstable angina. No MI. She underwent a  left-heart catheterization and demonstrated ostial LAD stenosis about  80%, some mild left main disease, but wide-open brisk flow through the  left main into the circumflex, ALEXANDRA-2 flow into the LAD. She had some  mild mid right coronary artery disease in a portion of the vessel that could be stented  in the future if necessary. She is a vasculopath. She has had vein  harvested from the right leg. She has bilateral lower leg venous  disease insufficiency and peripheral vascular disease with ischemic  changes noted. She follows with Dr. Kayode Farr from a vascular standpoint. She is not a candidate for proximal LAD PCI and she referred for  surgical reevaluation. I saw the patient in consultation and examined  the patient after reviewing all her studies. I discussed the status of her peripheral vascular disease  with Dr. Kayode Farr. I recommended coronary bypass grafting surgery x1 with  LIMA to LAD. Preserving the vein for future lower extremity revascularization. I saw no reason to  graft her circumflex and her right can be preserved for later PCI. She has no  critical disease in her left main. She has globally  impaired systolic function, ejection fraction about 25%. She has mild  to moderate MR. Surgery was discussed with the patient and her family  involving risks, benefits and alternatives including risk of infection,  bleeding, stroke, MI, arrhythmias, operative mortality risk in the 3 to  5% range. Questions were answered and consent was obtained to proceed  with surgery.     INTRAOPERATIVE FINDINGS: Transesophageal echo placed by Anesthesia and  also reviewed by myself. Preprocedurally, showed mild to moderate MR. No aortic insufficiency. No interatrial septal defect. No clot in the  left atrial appendage. Postprocedural URI showed mild improved MR and  no other structural changes. Ejection fraction improved, may be to 30%  to 35%. Epiaortic ultrasound which I independently performed and  interpreted based my surgical plans on showed no atherosclerotic  disease. The FRANCINE had excellent soft flow and no calcific wall disease. IV Tylenol and Amicar were administered prior to incision. Ancef and  vancomycin also administered prior to incision. OPERATIVE PROCEDURE:  The patient was brought to the operating room and  placed on the operating table in supine position. Right brachial  arterial monitoring line was placed. General endotracheal anesthesia  was accomplished. Lees catheter placed. Right internal jugular vas  catheter was placed and left internal [place] introducer was placed. Nicole Field was not placed initially because of concern for the CardioMEMS  device that is her pulmonary artery, but it has been in for a year and  so, the [place] catheter was placed after we closed the sternotomy  prior to leaving the OR. The chest, abdomen, groin, and legs were all  prepped with DuraPrep and draped in the usual sterile fashion for open  heart surgery. Time-out process was carried out appropriately  identifying the patient and the procedure. Sternal incision was made. The heart was exposed through median  sternotomy incision. Hemostasis was achieved and then the left internal  artery was taken out of its position underneath the sternum with  electrocautery. Systemic heparin was administered. Distal end of the  FRANCINE clipped with surgical clips, divided, stump oversewn with Ethibond  suture, and the pedicle placed in the left upper chest until needed for  bypass.   The pericardium was then was  ventilated. She was then  from bypass with low-dose dobutamine  and little Levophed. She remained stable off bypass. With the patient  stable off bypass, the venous cannula was removed and the pursestring  suture securing it was tied and reinforced with 3-0 Prolene suture. The  heparin effect was reversed with protamine, returning ACT towards  baseline. As I did that, the aortic cannula was removed and the  pursestring suture securing it was tied x2 and then reinforced with  pledgeted 3-0 Prolene suture and placed in a horizontal mattress  fashion. At this point, I continued to work on hemostasis, until found to be  satisfactory for closure. Doppler was used to check the grafts, found to have satisfactory  systolic and diastolic signals. With satisfactory hemostasis, the pericardium re-approximated over the  ascending aorta as well as base of the diaphragm. Platelet poor-gel was  placed inside the pericardium. Platelet rich-gel between the sternum  area was closed. The sternum reapproximated with two stainless steel  wires and then four sternal plates, one eight-hole plate in the  manubrium, two eight-hole plates in the midsternum and one four-hole  plate down above the xiphoid process. Platelet rich-gel was placed  between the sternum as it was closed. The sternum wound was then  irrigated with copious amounts of warm saline and closed in appropriate  layers with Vicryl suture including subcuticular stitch in the skin. ESTIMATED BLOOD LOSS:  50 to 100 mL. REPLACEMENTS:  None. COUNTS:  Sponge and needle counts were correct x2.         Dejon Hermosillo MD    D: 01/27/2020 12:22:01       T: 01/27/2020 14:15:45     [email or web address]  Job#: [id number]     Doc#: [id number]    CC:  MD Jay De [place], [place]       Sneha Nieto MD

## 2020-01-27 NOTE — PROGRESS NOTES
Spontaneous breathing trial initiated per MD order at bedside. MD visualized PCXR result and adjusted pulmonary catheter to 55 cm. Pt tolerating same well.

## 2020-01-27 NOTE — BRIEF OP NOTE
Brief Postoperative Note  ______________________________________________________________    Patient: Taylor Calderón  YOB: 1965  MRN: [id number]  Date of Procedure: 1/27/2020    Pre-Op Diagnosis: -CAD/ [place]. S/P PCI on Plavix anticoagulation. HTN, IDDM, Dyslipidemia. Chronic kidney disease. Acute on chronic combined CHF. Tobacco abuse disorder. Peripheral arterial bilateral LE disease. Post-Op Diagnosis: Same. Chronic pericarditis. Procedure(s):   Urgent CABG X 1, with pedicled LIMA to LAD, SGC, CPB with On pump-beating heart, URI, Epiaortic ultrasound, Doppler verification of grafts, Bilateral 5 level intercostal nerve block(Exparel), Platelet gel application. Sternal plating. Vas Cath placement. Anesthesia: General ET/ MD Jaime    Surgeon(s):  Susan Ma MD    Assistant: SA Kapoor Janine Rouleau, [place]    Estimated Blood Loss (mL): 50 ml    Replacements:  None    Pump time:  61 min    Cross clamp time:  NA    Complications: None    Specimens:   * No specimens in log *    Implants:  Implant Name Type Inv. Item Serial No.  Lot No. LRB No. Used   PLATE L STERNAL LK 4 HL Screw/Plate/Nail/Jere PLATE L STERNAL LK 4 HL  [employer]  N/A 1   PLATE JL 8 HL Screw/Plate/Nail/Jere PLATE JL 8 HL  [employer]  N/A 1   PLATE X STERNALOCK 8 HL Screw/Plate/Nail/Jere PLATE X STERNALOCK 8 HL  [employer]  N/A 2   SCREW STERNAL LOCK 2.4 X 12MM Screw/Plate/Nail/Jere SCREW STERNAL LOCK 2.4 X 12MM  [employer]  N/A 16   SCREW LK STERNALOCK 2.4X14MM Screw/Plate/Nail/Jere SCREW LK STERNALOCK 2.4X14MM  [employer]  N/A 14   SCREW STERNALOCK 2.4X18MM Screw/Plate/Nail/Jere SCREW STERNALOCK 2.4X18MM  [employer]  N/A 8         Drains:   Chest Tube 1 Mediastinal 32 [place] (Active)       Chest Tube 2 Pleural 32 [place] (Active)       Urethral Catheter Straight-tip; Temperature probe; Latex 16 fr (Active)       [REMOVED] Negative Pressure Wound Therapy Groin Right (Removed)       Findings: normal ascending

## 2020-01-27 NOTE — ANESTHESIA PROCEDURE NOTES
Procedure Performed: URI     Start Time:        End Time:      Preanesthesia Checklist:  Patient identified, IV assessed, risks and benefits discussed, monitors and equipment assessed, procedure being performed at surgeon's request and anesthesia consent obtained.     General Procedure Information  Diagnostic Indications for Echo:  hemodynamic monitoring  Location performed:  OR  Intubated  Heart visualized  Probe Type:  3D  Modalities:  2D only, 3D and color flow mapping        Anesthesia Information      Echocardiogram Comments:       Placement only

## 2020-01-27 NOTE — PROGRESS NOTES
Patient awake and following commands. Respiratory called for weaning parameters. Patient suctioned and extubated to 3 liters nasal cannula. Patient tolerated well. Oxygen saturation 98 on 3 liters nasal cannula.   Patient alert and oriented X 3.           RECOVERY PERIOD ENDS 1430

## 2020-01-27 NOTE — ANESTHESIA PRE PROCEDURE
Department of Anesthesiology  Preprocedure Note       Name:  Gabrielle Bernabe   Age:  62 y.o.  :  1965                                          MRN:  [id number]         Date:  2020      Surgeon: Rolando Magallanes):  Nickie Wilkerson MD    Procedure: CORONARY ARTERY BYPASS GRAFTING, OFF PUMP, INTERNAL MAMMARY ARTERY TO LEFT ANTERIOR DESCENDING ARTERY WITH VASCULAR CATHETERIZATION (N/A Chest)    Medications prior to admission:   Prior to Admission medications    Medication Sig Start Date End Date Taking? Authorizing Provider   torsemide (DEMADEX) 100 MG tablet Take 1 tablet by mouth 2 times daily 20   TRAY Mg CNP   spironolactone (ALDACTONE) 100 MG tablet Take 1 tablet by mouth 2 times daily 20   TRAY Mg CNP   metOLazone (ZAROXOLYN) 2.5 MG tablet Take 1 tablet by mouth daily 20   TRAY Mg CNP   nystatin (MYCOSTATIN) 697374 UNIT/GM cream  19   Historical Provider, MD   benztropine (COGENTIN) 1 MG tablet  19   Historical Provider, MD   blood glucose test strips (EXACTECH TEST) strip 6 times daily.  19   TRAY Pate CNP   Insulin Pen Needle (B-D ULTRAFINE III SHORT PEN) 31G X 8 MM MISC Five shots a day 12/3/19   TRAY Pate CNP   INSULIN SYRINGE .5CC/29G 29G X 1/2\" 0.5 ML MISC 1 each by Does not apply route daily 12/3/19   TRAY Pate CNP   Insulin Degludec (TRESIBA FLEXTOUCH) 100 UNIT/ML SOPN Inject 100 Units into the skin nightly 12/3/19   TRAY Pate CNP   carvedilol (COREG) 3.125 MG tablet TAKE 1 TABLET BY MOUTH TWICE A DAY WITH MEALS 19   TRAY Mg CNP   Liraglutide (VICTOZA) 18 MG/3ML SOPN SC injection Inject 1.2 mg into the skin daily 10/29/19   TRAY Pate CNP   vitamin D (ERGOCALCIFEROL) 1.25 MG (01434 UT) CAPS capsule Take 1 capsule by mouth once a week 10/29/19   Teofilo Vaughn, APRN - CNP   albuterol sulfate  (90 Base) MCG/ACT inhaler INHALE 2 PUFFS BY MOUTH 97/73   01/24/20 (!) 113/57   01/17/20 100/62       NPO Status:                                                                                 BMI:   Wt Readings from Last 3 Encounters:   01/26/20 249 lb 9.6 oz (113.2 kg)   01/17/20 245 lb (111.1 kg)   01/16/20 251 lb (113.9 kg)     There is no height or weight on file to calculate BMI.    CBC:   Lab Results   Component Value Date    WBC 12.3 01/26/2020    RBC 3.24 01/26/2020    HGB 9.3 01/26/2020    HCT 28.7 01/26/2020    MCV 88.7 01/26/2020    RDW 16.4 01/26/2020     01/26/2020       CMP:   Lab Results   Component Value Date     01/26/2020    K 3.8 01/26/2020    CL 95 01/26/2020    CO2 22 01/26/2020    BUN 46 01/26/2020    CREATININE 1.7 01/26/2020    GFRAA 38 01/26/2020    AGRATIO 1.1 01/26/2020    LABGLOM 31 01/26/2020    GLUCOSE 135 01/26/2020    PROT 7.0 01/26/2020    CALCIUM 8.7 01/26/2020    BILITOT 0.5 01/26/2020    ALKPHOS 92 01/26/2020    AST 9 01/26/2020    ALT 7 01/26/2020       POC Tests:   Recent Labs     01/26/20 1957   POCGLU 194*       Coags:   Lab Results   Component Value Date    PROTIME 12.4 10/08/2019    INR 1.09 10/08/2019    APTT 59.1 01/20/2020       HCG (If Applicable): No results found for: PREGTESTUR, PREGSERUM, HCG, HCGQUANT     ABGs: No results found for: PHART, PO2ART, AYI8FIP, WUY0HTO, BEART, R9DNTEWN     Type & Screen (If Applicable):  No results found for: LABABO, [address]    Anesthesia Evaluation  Patient summary reviewed and Nursing notes reviewed no history of anesthetic complications:   Airway: Mallampati: IV  TM distance: <3 FB   Neck ROM: limited  Mouth opening: > = 3 FB Dental:    (+) upper dentures and partials      Pulmonary:Negative Pulmonary ROS   (+) COPD:  sleep apnea: on noncompliant,                            ROS comment: H/o tob   Cardiovascular:Negative CV ROS    (+) hypertension:, past MI: > 6 months, CAD:, CABG/stent (stents x 6):, dysrhythmias (RBBB palpitations):, CHF: systolic, pulmonary visualized. ERO estimated at 0.29 cm2. Mild tricuspid regurgitation. The left atrial appendage shows evidence of thrombus formation and low flow   velocities. Moderate amount of plaque in the aorta.       Signature      ------------------------------------------------------------------   Electronically signed by Abe Hollingsworth MD (Interpreting   physician) on 01/24/2020 at 01:55 PM   ------------------------------------------------------------------    Sinus rhythm with occasional Premature ventricular complexesLeft axis deviationRight bundle branch blockAbnormal ECGConfirmed by [facility] Elda MENDES ([id number]) on 1/18/2020 3:01:32 PM    Evita Eason MD   1/26/2020

## 2020-01-27 NOTE — PROGRESS NOTES
01/27/20 1246   Vent Information   $Ventilation $Initial Day   Ventilator Started Yes   Vent Type 840   Vent Mode AC/VC   Vt Ordered 500 mL   Rate Set 12 bmp   Peak Flow 55 L/min   Pressure Support 10 cmH20   FiO2  50 %   Sensitivity 3   PEEP/CPAP 5   Vent Patient Data   Peak Inspiratory Pressure 28 cmH2O   Mean Airway Pressure 8.6 cmH20   Rate Measured 22 br/min   Vt Exhaled 549 mL   Minute Volume 10.1 Liters   I:E Ratio 1:2.60   Plateau Pressure 38 ZTY54   Static Compliance 17 mL/cmH2O   Dynamic Compliance 24 mL/cmH2O   Breath Sounds   Right Upper Lobe Clear   Right Middle Lobe Clear   Right Lower Lobe Clear   Left Upper Lobe Clear   Left Lower Lobe Clear   Additional Respiratory  Assessments   End Tidal CO2 32 (%)   Alarm Settings   High Pressure Alarm 55 cmH2O   Low Minute Volume Alarm 2 L/min   High Respiratory Rate 45 br/min   Low Exhaled Vt  200 mL   ETT (adult)   Placement Date/Time: 01/27/20 0734   Preoxygenation: Yes  Mask Ventilation: Ventilated by mask (1)  Technique: Video laryngoscopy  Type: Cuffed  Tube Size: 7.5 mm  Laryngoscope: GlideScope  Blade Size: 3  Location: Oral  Grade View: Full view of the g...    Secured at 23 cm   Measured From Lips   ET Placement Right   Secured By Twill tape   Cuff Pressure 32 cm H2O

## 2020-01-28 LAB
ACTIVATED CLOTTING TIME: 123 SEC (ref 99–130)
ACTIVATED CLOTTING TIME: 145 SEC (ref 99–130)
ACTIVATED CLOTTING TIME: 462 SEC (ref 99–130)
ACTIVATED CLOTTING TIME: 521 SEC (ref 99–130)
ACTIVATED CLOTTING TIME: 544 SEC (ref 99–130)
ANION GAP SERPL CALCULATED.3IONS-SCNC: 13 MMOL/L (ref 3–16)
BASE EXCESS ARTERIAL: 1 (ref -3–3)
BUN BLDV-MCNC: 39 MG/DL (ref 7–20)
CALCIUM IONIZED: 1.17 MMOL/L (ref 1.12–1.32)
CALCIUM SERPL-MCNC: 8.7 MG/DL (ref 8.3–10.6)
CHLORIDE BLD-SCNC: 100 MMOL/L (ref 99–110)
CO2: 23 MMOL/L (ref 21–32)
CREAT SERPL-MCNC: 1.5 MG/DL (ref 0.6–1.1)
GFR AFRICAN AMERICAN: 43
GFR NON-AFRICAN AMERICAN: 36
GLUCOSE BLD-MCNC: 101 MG/DL (ref 70–99)
GLUCOSE BLD-MCNC: 104 MG/DL (ref 70–99)
GLUCOSE BLD-MCNC: 117 MG/DL (ref 70–99)
GLUCOSE BLD-MCNC: 123 MG/DL (ref 70–99)
GLUCOSE BLD-MCNC: 124 MG/DL (ref 70–99)
GLUCOSE BLD-MCNC: 127 MG/DL (ref 70–99)
GLUCOSE BLD-MCNC: 130 MG/DL (ref 70–99)
GLUCOSE BLD-MCNC: 131 MG/DL (ref 70–99)
GLUCOSE BLD-MCNC: 132 MG/DL (ref 70–99)
GLUCOSE BLD-MCNC: 133 MG/DL (ref 70–99)
GLUCOSE BLD-MCNC: 135 MG/DL (ref 70–99)
GLUCOSE BLD-MCNC: 138 MG/DL (ref 70–99)
GLUCOSE BLD-MCNC: 139 MG/DL (ref 70–99)
GLUCOSE BLD-MCNC: 140 MG/DL (ref 70–99)
GLUCOSE BLD-MCNC: 149 MG/DL (ref 70–99)
GLUCOSE BLD-MCNC: 169 MG/DL (ref 70–99)
HCO3 ARTERIAL: 26.1 MMOL/L (ref 21–29)
HCT VFR BLD CALC: 27.2 % (ref 36–48)
HEMOGLOBIN: 8.9 G/DL (ref 12–16)
HEMOGLOBIN: 9.2 GM/DL (ref 12–16)
INR BLD: 1.44 (ref 0.86–1.14)
LACTATE: 0.51 MMOL/L (ref 0.4–2)
MAGNESIUM: 1.8 MG/DL (ref 1.8–2.4)
MCH RBC QN AUTO: 28.9 PG (ref 26–34)
MCHC RBC AUTO-ENTMCNC: 32.8 G/DL (ref 31–36)
MCV RBC AUTO: 88.1 FL (ref 80–100)
O2 SAT, ARTERIAL: 96 % (ref 93–100)
PCO2 ARTERIAL: 44 MM HG (ref 35–45)
PDW BLD-RTO: 16.4 % (ref 12.4–15.4)
PERFORMED ON: ABNORMAL
PH ARTERIAL: 7.38 (ref 7.35–7.45)
PLATELET # BLD: 238 K/UL (ref 135–450)
PMV BLD AUTO: 8.3 FL (ref 5–10.5)
PO2 ARTERIAL: 82.7 MM HG (ref 75–108)
POC HEMATOCRIT: 27 % (ref 36–48)
POC POTASSIUM: 3.7 MMOL/L (ref 3.5–5.1)
POC SAMPLE TYPE: ABNORMAL
POC SODIUM: 137 MMOL/L (ref 136–145)
POTASSIUM SERPL-SCNC: 3.8 MMOL/L (ref 3.5–5.1)
POTASSIUM SERPL-SCNC: 3.9 MMOL/L (ref 3.5–5.1)
PROTHROMBIN TIME: 16.8 SEC (ref 10–13.2)
RBC # BLD: 3.09 M/UL (ref 4–5.2)
SODIUM BLD-SCNC: 136 MMOL/L (ref 136–145)
TCO2 ARTERIAL: 28 MMOL/L
WBC # BLD: 17.7 K/UL (ref 4–11)

## 2020-01-28 PROCEDURE — 6360000002 HC RX W HCPCS: Performed by: THORACIC SURGERY (CARDIOTHORACIC VASCULAR SURGERY)

## 2020-01-28 PROCEDURE — 2140000000 HC CCU INTERMEDIATE R&B

## 2020-01-28 PROCEDURE — 82330 ASSAY OF CALCIUM: CPT

## 2020-01-28 PROCEDURE — 99024 POSTOP FOLLOW-UP VISIT: CPT | Performed by: THORACIC SURGERY (CARDIOTHORACIC VASCULAR SURGERY)

## 2020-01-28 PROCEDURE — 99232 SBSQ HOSP IP/OBS MODERATE 35: CPT | Performed by: INTERNAL MEDICINE

## 2020-01-28 PROCEDURE — 94669 MECHANICAL CHEST WALL OSCILL: CPT

## 2020-01-28 PROCEDURE — 6370000000 HC RX 637 (ALT 250 FOR IP): Performed by: THORACIC SURGERY (CARDIOTHORACIC VASCULAR SURGERY)

## 2020-01-28 PROCEDURE — 82803 BLOOD GASES ANY COMBINATION: CPT

## 2020-01-28 PROCEDURE — 83735 ASSAY OF MAGNESIUM: CPT

## 2020-01-28 PROCEDURE — 2580000003 HC RX 258: Performed by: THORACIC SURGERY (CARDIOTHORACIC VASCULAR SURGERY)

## 2020-01-28 PROCEDURE — 84295 ASSAY OF SERUM SODIUM: CPT

## 2020-01-28 PROCEDURE — 80048 BASIC METABOLIC PNL TOTAL CA: CPT

## 2020-01-28 PROCEDURE — 85027 COMPLETE CBC AUTOMATED: CPT

## 2020-01-28 PROCEDURE — 82947 ASSAY GLUCOSE BLOOD QUANT: CPT

## 2020-01-28 PROCEDURE — 6370000000 HC RX 637 (ALT 250 FOR IP): Performed by: NURSE PRACTITIONER

## 2020-01-28 PROCEDURE — 2700000000 HC OXYGEN THERAPY PER DAY

## 2020-01-28 PROCEDURE — 83605 ASSAY OF LACTIC ACID: CPT

## 2020-01-28 PROCEDURE — 94761 N-INVAS EAR/PLS OXIMETRY MLT: CPT

## 2020-01-28 PROCEDURE — 84132 ASSAY OF SERUM POTASSIUM: CPT

## 2020-01-28 PROCEDURE — 85610 PROTHROMBIN TIME: CPT

## 2020-01-28 PROCEDURE — 85014 HEMATOCRIT: CPT

## 2020-01-28 RX ORDER — FUROSEMIDE 10 MG/ML
40 INJECTION INTRAMUSCULAR; INTRAVENOUS 2 TIMES DAILY
Status: DISCONTINUED | OUTPATIENT
Start: 2020-01-28 | End: 2020-01-29

## 2020-01-28 RX ADMIN — DOCUSATE SODIUM 100 MG: 100 CAPSULE, LIQUID FILLED ORAL at 08:08

## 2020-01-28 RX ADMIN — INSULIN GLARGINE 17 UNITS: 100 INJECTION, SOLUTION SUBCUTANEOUS at 21:03

## 2020-01-28 RX ADMIN — POTASSIUM CHLORIDE 20 MEQ: 29.8 INJECTION, SOLUTION INTRAVENOUS at 05:47

## 2020-01-28 RX ADMIN — MORPHINE SULFATE 2 MG: 2 INJECTION, SOLUTION INTRAMUSCULAR; INTRAVENOUS at 06:17

## 2020-01-28 RX ADMIN — MORPHINE SULFATE 2 MG: 2 INJECTION, SOLUTION INTRAMUSCULAR; INTRAVENOUS at 19:26

## 2020-01-28 RX ADMIN — OXYCODONE 5 MG: 5 TABLET ORAL at 13:32

## 2020-01-28 RX ADMIN — ATORVASTATIN CALCIUM 80 MG: 80 TABLET, FILM COATED ORAL at 21:04

## 2020-01-28 RX ADMIN — MUPIROCIN: 20 OINTMENT TOPICAL at 08:00

## 2020-01-28 RX ADMIN — POTASSIUM CHLORIDE 20 MEQ: 29.8 INJECTION, SOLUTION INTRAVENOUS at 00:55

## 2020-01-28 RX ADMIN — ONDANSETRON 4 MG: 2 INJECTION INTRAMUSCULAR; INTRAVENOUS at 12:26

## 2020-01-28 RX ADMIN — ACETAMINOPHEN 1000 MG: 500 TABLET ORAL at 13:34

## 2020-01-28 RX ADMIN — OXYCODONE 5 MG: 5 TABLET ORAL at 22:40

## 2020-01-28 RX ADMIN — DOCUSATE SODIUM 100 MG: 100 CAPSULE, LIQUID FILLED ORAL at 21:04

## 2020-01-28 RX ADMIN — POTASSIUM CHLORIDE 10 MEQ: 750 TABLET, EXTENDED RELEASE ORAL at 08:08

## 2020-01-28 RX ADMIN — Medication 15 ML: at 21:05

## 2020-01-28 RX ADMIN — FUROSEMIDE 40 MG: 10 INJECTION, SOLUTION INTRAMUSCULAR; INTRAVENOUS at 08:09

## 2020-01-28 RX ADMIN — VANCOMYCIN HYDROCHLORIDE 1.5 G: 10 INJECTION, POWDER, LYOPHILIZED, FOR SOLUTION INTRAVENOUS at 11:35

## 2020-01-28 RX ADMIN — BUSPIRONE HYDROCHLORIDE 30 MG: 5 TABLET ORAL at 08:08

## 2020-01-28 RX ADMIN — BUSPIRONE HYDROCHLORIDE 30 MG: 5 TABLET ORAL at 21:05

## 2020-01-28 RX ADMIN — MORPHINE SULFATE 2 MG: 2 INJECTION, SOLUTION INTRAMUSCULAR; INTRAVENOUS at 04:59

## 2020-01-28 RX ADMIN — MUPIROCIN: 20 OINTMENT TOPICAL at 21:05

## 2020-01-28 RX ADMIN — MORPHINE SULFATE 2 MG: 2 INJECTION, SOLUTION INTRAMUSCULAR; INTRAVENOUS at 15:11

## 2020-01-28 RX ADMIN — ACETAMINOPHEN 1000 MG: 10 INJECTION, SOLUTION INTRAVENOUS at 05:03

## 2020-01-28 RX ADMIN — HEPARIN SODIUM 5000 UNITS: 5000 INJECTION INTRAVENOUS; SUBCUTANEOUS at 21:03

## 2020-01-28 RX ADMIN — MORPHINE SULFATE 2 MG: 2 INJECTION, SOLUTION INTRAMUSCULAR; INTRAVENOUS at 10:01

## 2020-01-28 RX ADMIN — DOBUTAMINE HYDROCHLORIDE 3 MCG/KG/MIN: 200 INJECTION INTRAVENOUS at 05:28

## 2020-01-28 RX ADMIN — Medication 250 MG: at 21:04

## 2020-01-28 RX ADMIN — HEPARIN SODIUM 5000 UNITS: 5000 INJECTION INTRAVENOUS; SUBCUTANEOUS at 06:09

## 2020-01-28 RX ADMIN — ACETAMINOPHEN 1000 MG: 500 TABLET ORAL at 21:04

## 2020-01-28 RX ADMIN — MORPHINE SULFATE 4 MG: 4 INJECTION, SOLUTION INTRAMUSCULAR; INTRAVENOUS at 21:43

## 2020-01-28 RX ADMIN — ASPIRIN 325 MG: 325 TABLET, COATED ORAL at 08:08

## 2020-01-28 RX ADMIN — LAMOTRIGINE 150 MG: 100 TABLET ORAL at 21:05

## 2020-01-28 RX ADMIN — OXYCODONE 5 MG: 5 TABLET ORAL at 17:56

## 2020-01-28 RX ADMIN — Medication 15 ML: at 08:00

## 2020-01-28 RX ADMIN — Medication 10 ML: at 21:05

## 2020-01-28 RX ADMIN — CEFAZOLIN SODIUM 2 G: 10 INJECTION, POWDER, FOR SOLUTION INTRAVENOUS at 17:56

## 2020-01-28 RX ADMIN — MELATONIN TAB 5 MG 5 MG: 5 TAB at 22:40

## 2020-01-28 RX ADMIN — POTASSIUM CHLORIDE 20 MEQ: 29.8 INJECTION, SOLUTION INTRAVENOUS at 02:13

## 2020-01-28 RX ADMIN — LAMOTRIGINE 150 MG: 100 TABLET ORAL at 08:09

## 2020-01-28 RX ADMIN — CEFAZOLIN SODIUM 2 G: 10 INJECTION, POWDER, FOR SOLUTION INTRAVENOUS at 01:35

## 2020-01-28 RX ADMIN — HEPARIN SODIUM 5000 UNITS: 5000 INJECTION INTRAVENOUS; SUBCUTANEOUS at 15:11

## 2020-01-28 RX ADMIN — MORPHINE SULFATE 2 MG: 2 INJECTION, SOLUTION INTRAMUSCULAR; INTRAVENOUS at 02:13

## 2020-01-28 RX ADMIN — POTASSIUM CHLORIDE 20 MEQ: 29.8 INJECTION, SOLUTION INTRAVENOUS at 06:42

## 2020-01-28 RX ADMIN — BENZTROPINE MESYLATE 1 MG: 1 TABLET ORAL at 08:17

## 2020-01-28 RX ADMIN — CEFAZOLIN SODIUM 2 G: 10 INJECTION, POWDER, FOR SOLUTION INTRAVENOUS at 10:03

## 2020-01-28 RX ADMIN — FUROSEMIDE 40 MG: 10 INJECTION, SOLUTION INTRAMUSCULAR; INTRAVENOUS at 17:56

## 2020-01-28 RX ADMIN — FAMOTIDINE 20 MG: 20 TABLET, FILM COATED ORAL at 08:09

## 2020-01-28 RX ADMIN — Medication 250 MG: at 08:08

## 2020-01-28 ASSESSMENT — PAIN SCALES - GENERAL
PAINLEVEL_OUTOF10: 3
PAINLEVEL_OUTOF10: 7
PAINLEVEL_OUTOF10: 5
PAINLEVEL_OUTOF10: 10
PAINLEVEL_OUTOF10: 0
PAINLEVEL_OUTOF10: 0
PAINLEVEL_OUTOF10: 9
PAINLEVEL_OUTOF10: 0
PAINLEVEL_OUTOF10: 9
PAINLEVEL_OUTOF10: 9
PAINLEVEL_OUTOF10: 6
PAINLEVEL_OUTOF10: 7
PAINLEVEL_OUTOF10: 6
PAINLEVEL_OUTOF10: 0
PAINLEVEL_OUTOF10: 6
PAINLEVEL_OUTOF10: 5
PAINLEVEL_OUTOF10: 6
PAINLEVEL_OUTOF10: 0

## 2020-01-28 ASSESSMENT — PAIN DESCRIPTION - PAIN TYPE
TYPE: SURGICAL PAIN

## 2020-01-28 ASSESSMENT — PAIN DESCRIPTION - ORIENTATION
ORIENTATION: MID
ORIENTATION: MID

## 2020-01-28 ASSESSMENT — PAIN SCALES - WONG BAKER

## 2020-01-28 ASSESSMENT — PAIN DESCRIPTION - LOCATION
LOCATION: CHEST
LOCATION: CHEST

## 2020-01-28 ASSESSMENT — PAIN - FUNCTIONAL ASSESSMENT: PAIN_FUNCTIONAL_ASSESSMENT: PREVENTS OR INTERFERES SOME ACTIVE ACTIVITIES AND ADLS

## 2020-01-28 ASSESSMENT — PAIN DESCRIPTION - DESCRIPTORS: DESCRIPTORS: ACHING

## 2020-01-28 ASSESSMENT — PAIN DESCRIPTION - ONSET: ONSET: ON-GOING

## 2020-01-28 ASSESSMENT — PAIN DESCRIPTION - FREQUENCY: FREQUENCY: CONTINUOUS

## 2020-01-28 ASSESSMENT — PAIN DESCRIPTION - PROGRESSION: CLINICAL_PROGRESSION: NOT CHANGED

## 2020-01-28 NOTE — PROGRESS NOTES
Dr. Nelly Velasquez at bedside for rounds. POC reviewed and updated. Pt present and aware. Understanding verbalized. No orders received at this time.

## 2020-01-28 NOTE — PROGRESS NOTES
This note also relates to the following rows which could not be included:  Resp - Cannot attach notes to unvalidated device data  SpO2 - Cannot attach notes to unvalidated device data       01/27/20 2039   Treatment   Treatment Type Vibratory mucous clearing therapy or intervention   $Bronchial Hygiene $Oscillatory therapy   Breath Sounds Post-Tx AKBAR Diminished   Breath Sounds Post-Tx LLL Diminished   Breath Sounds Post-Tx RUL Diminished   Breath Sounds Post-Tx RML Diminished   Breath Sounds Post-Tx RLL Diminished   Tx Tolerance Well   Is patient on O2?  Y   Oxygen Therapy/Pulse Ox   O2 Therapy Oxygen   $Oxygen $Daily Charge   O2 Device Nasal cannula   O2 Flow Rate (L/min) 2 L/min   Pulse Oximeter Device Mode Intermittent   $Pulse Oximeter $Spot check (multiple/continuous)   Patient Observation   Observations acapella x 15 breaths   ambu bag at bedside

## 2020-01-28 NOTE — PROGRESS NOTES
[address]   Daily Cardiovascular Progress Note    Admit Date: 1/17/2020    Chief complaint: Chest pain  HPI:     Patient presented with chest pain on January 17, 2020, troponin was mildly elevated 0.01. She underwent heart catheterization which showed multivessel CAD and ultimately underwent CABG x1 (on pump beating heart technique, pedicled CORNEJO to LAD) with Dr. Ne Oquendo on January 27, 2020 after optimization of her medical status. She did have heart failure during this hospital admission and had a URI to assess for mitral regurgitation which showed moderate to severe MR. Today she has incisional pain but otherwise has no complaints.       Medications/Labs all Reviewed:  Patient Active Problem List   Diagnosis    Severe claudication ([address]    Diabetes mellitus type 2 in obese ([address]    HTN (hypertension)    CLINT (obstructive sleep apnea)    Tobacco abuse disorder    PVD (peripheral vascular disease) with claudication (HCC)    Hypotension    Volume depletion    GERD (gastroesophageal reflux disease)    Mixed hyperlipidemia    Anxiety and depression    Presyncope    Hyponatremia    Hypochloremia    Normocytic anemia    Hypomagnesemia    Acute hyperglycemia    CAD (coronary artery disease)    PVD (peripheral vascular disease) (HCC)    Acute systolic congestive heart failure (HCC)    Mitral regurgitation    Myocardiopathy (HCC)    Palpitations    Chronic systolic congestive heart failure (HCC)    CKD (chronic kidney disease) stage 3, GFR 30-59 ml/min (HCC)    Hypokalemia    Acute renal failure with tubular necrosis (HCC)    Claudication in peripheral vascular disease ([address]    Hypotension    COPD (chronic obstructive pulmonary disease) (HCC)    Cellulitis    SHAUNNA (acute kidney injury) ([address]    Acute renal insufficiency    Hypervolemia    Vitamin D deficiency    Acute kidney injury ([address]    Cellulitis of both feet    Acute on chronic systolic congestive heart failure ([facility]    Cellulitis of foot, left    Peripheral edema    Diabetic ulcer of toe of right foot associated with type 2 diabetes mellitus ([facility]    Diabetic ulcer of left foot associated with type 2 diabetes mellitus ([facility]    Hypoglycemia associated with type 2 diabetes mellitus ([facility]    Unstable angina pectoris ([facility]    Cardiomyopathy, ischemic    Acute anxiety    Diabetes mellitus ([facility]    Dyslipidemia       Medications:  furosemide (LASIX) injection 40 mg, BID  acetaminophen (TYLENOL) tablet 1,000 mg, Q8H  sodium chloride flush 0.9 % injection 10 mL, 2 times per day  sodium chloride flush 0.9 % injection 10 mL, PRN  potassium chloride 20 mEq/50 mL IVPB (Central Line), PRN  magnesium sulfate 2 g in 50 mL IVPB premix, PRN  calcium chloride 1 g in sodium chloride 0.9 % 100 mL IVPB, PRN  calcium chloride 2 g in sodium chloride 0.9 % 100 mL IVPB, PRN  ceFAZolin (ANCEF) 2 g in dextrose 5 % 100 mL IVPB, Q8H  vancomycin 1.5 g in dextrose 5% 300 mL IVPB, Q24H  [START ON 1/30/2020] acetaminophen (TYLENOL) tablet 650 mg, Q4H PRN  oxyCODONE (ROXICODONE) immediate release tablet 5 mg, Q4H PRN    Or  oxyCODONE (ROXICODONE) immediate release tablet 10 mg, Q4H PRN  morphine (PF) injection 2 mg, Q2H PRN    Or  morphine (PF) injection 4 mg, Q2H PRN  midazolam (VERSED) injection 1 mg, Q1H PRN  diphenhydrAMINE (BENADRYL) tablet 25 mg, Nightly PRN  docusate sodium (COLACE) capsule 100 mg, BID  ondansetron (ZOFRAN) injection 4 mg, Q8H PRN  metoprolol tartrate (LOPRESSOR) tablet 12.5 mg, BID  chlorhexidine (PERIDEX) 0.12 % solution 15 mL, BID  mupirocin (BACTROBAN) 2 % ointment, BID  nitroGLYCERIN (NITRODUR) 0.2 MG/HR 1 patch, Daily  potassium chloride (KLOR-CON M) extended release tablet 10 mEq, TID WC  aspirin EC tablet 325 mg, Daily  albuterol sulfate  (90 Base) MCG/ACT inhaler 2 puff, Q6H PRN  albumin human 5 % IV solution 25 g, PRN  lactated ringers bolus, Continuous PRN  DOBUTamine (DOBUTREX) 500 mg in dextrose 5 %

## 2020-01-28 NOTE — PROGRESS NOTES
Nutrition Assessment    Type and Reason for Visit: Reassess    Nutrition Recommendations:   1. Resume Cardiac, carb controlled diet when able to advance per MD  2. Trial Glucerna ONS BID once diet advanced  3. Encourage nutrition and monitor PO adequacy   4. Monitor diet education needs p/t d/c     Nutrition Assessment: Pt due for follow up assessment. S/p CABG yesterday. NPO this am. Pt seen in room, in pain and lethargic. Not appropriate for diet education at this time. Will continue to monitor. Suspect appetite will be poor post-op. Pt nodded yes to ONS, will add once diet advanced to promote protein/calorie intake while needs increased post-op. Malnutrition Assessment:  · Malnutrition Status: No malnutrition    Nutrition Risk Level: Moderate    Nutrient Needs:  · Estimated Daily Total Kcal: 2721-6406  · Estimated Daily Protein (g): 76-87  · Estimated Daily Total Fluid (ml/day): 1 kcal/ml    Nutrition Diagnosis:   · Problem: Increased nutrient needs  · Etiology: related to Increased demand for energy/nutrients     Signs and symptoms:  as evidenced by (upcoming cardiac surgery)    Objective Information:  · Nutrition-Focused Physical Findings: BM 1/26  · Wound Type: Diabetic Ulcer(scattered bruising)  · Current Nutrition Therapies:  · Oral Diet Orders: NPO   · Oral Diet intake: NPO  · Oral Nutrition Supplement (ONS) Orders: None  · ONS intake: NPO  · Anthropometric Measures:  · Ht: 5' 4.5\" (163.8 cm)   · Current Body Wt: 249 lb (112.9 kg)  · Ideal Body Wt: 128 lb (58.1 kg),  · BMI Classification: BMI > or equal to 40.0 Obese Class III    Nutrition Interventions:   (advance as tolerated; may need nutrition supplements in the near future to enhance protein)  Continued Inpatient Monitoring    Nutrition Evaluation:   · Evaluation: Progressing toward goals   · Goals: Patient will eat 50% or greater of meals and supplements.       · Monitoring: Supplement Intake, Meal Intake, Pertinent Labs, Weight, Nutrition Progression      Electronically signed by Lina Brown, SIOBHAN, LD on 1/28/20 at 8:23 AM    Contact Number: [phone]

## 2020-01-28 NOTE — PROGRESS NOTES
MD at [address]  02/14/2019    CardioMEMs insertion for CHF    ROTATOR CUFF REPAIR Left 04/22/2016    TONSILLECTOMY      TRANSLUMINAL ANGIOPLASTY Bilateral 10/08/2019    BLE w/PTA occluded L SFA and restenting L Prox SFA    TUMOR EXCISION      benign tumors X 2 chest & axilla    UPPER GASTROINTESTINAL ENDOSCOPY  4/25/2013    BX barretts, HH, gastritis    UPPER GASTROINTESTINAL ENDOSCOPY  12/10/2015    UPPER GASTROINTESTINAL ENDOSCOPY  01/06/2017    Gastritis        Allergies as of 01/17/2020 - Review Complete 01/17/2020   Allergen Reaction Noted    Lisinopril Other (See Comments) 09/05/2018        Patient Active Problem List   Diagnosis    Severe claudication ([address]    Diabetes mellitus type 2 in obese ([address]    HTN (hypertension)    CLINT (obstructive sleep apnea)    Tobacco abuse disorder    PVD (peripheral vascular disease) with claudication (HCC)    Hypotension    Volume depletion    GERD (gastroesophageal reflux disease)    Mixed hyperlipidemia    Anxiety and depression    Presyncope    Hyponatremia    Hypochloremia    Normocytic anemia    Hypomagnesemia    Acute hyperglycemia    CAD (coronary artery disease)    PVD (peripheral vascular disease) (HCC)    Acute systolic congestive heart failure (HCC)    Mitral regurgitation    Myocardiopathy (HCC)    Palpitations    Chronic systolic congestive heart failure (HCC)    CKD (chronic kidney disease) stage 3, GFR 30-59 ml/min (HCC)    Hypokalemia    Acute renal failure with tubular necrosis (HCC)    Claudication in peripheral vascular disease ([address]    Hypotension    COPD (chronic obstructive pulmonary disease) (HCC)    Cellulitis    SHAUNNA (acute kidney injury) ([address]    Acute renal insufficiency    Hypervolemia    Vitamin D deficiency    Acute kidney injury ([address]    Cellulitis of both feet    Acute on chronic systolic congestive heart failure (HCC)    Cellulitis of foot, left    Peripheral edema    Diabetic ulcer of toe of right foot associated with type 2 diabetes mellitus ([facility]    Diabetic ulcer of left foot associated with type 2 diabetes mellitus ([facility]    Hypoglycemia associated with type 2 diabetes mellitus ([facility]    Unstable angina pectoris ([facility]    Cardiomyopathy, ischemic    Acute anxiety    Diabetes mellitus ([facility]    Dyslipidemia        Vital Signs: /61   Pulse 100   Temp 98.8 °F (37.1 °C) (Core)   Resp 14   Ht 5' 4.5\" (1.638 m)   Wt 249 lb 12.5 oz (113.3 kg)   LMP 10/24/2012   SpO2 99%   BMI 42.21 kg/m²  O2 Flow Rate (L/min): 2 L/min(weaned to 1lpm)     Admission Weight: Weight: 245 lb (111.1 kg)    Weight on 1/27 (113 kg) Pre-op   Weight on 1/28 (113.3 kg)    Intake/Output:     Intake/Output Summary (Last 24 hours) at 1/28/2020 0940  Last data filed at 1/28/2020 0718  Gross per 24 hour   Intake 3361.68 ml   Output 6290 ml   Net -2928.32 ml        GTTS: dobutamine at 3 mcg/min   Furosemide at 5 mg/hr    [place] Jurgen Catheter: PAP41/26 (32), CVP 13, CO 5, CI 2.29, SVR 1000    Extubation Time: 1/27 @ 14:28  Transition Time:    LABORATORY DATA:     CBC:   Recent Labs     01/27/20  0343  01/27/20  1250 01/27/20  1902 01/28/20  0014 01/28/20  0510   WBC 11.1*  --  26.5*  --   --  17.7*   HGB 9.1*   < > 9.2* 9.7* 9.2* 8.9*   HCT 28.2*  --  28.4*  --   --  27.2*   MCV 88.3  --  88.6  --   --  88.1     --  247  --   --  238    < > = values in this interval not displayed. BMP:   Recent Labs     01/27/20  0343  01/27/20  1250 01/27/20  1810 01/28/20  0018 01/28/20  0510   *  --  131*  --   --  136   K 4.2   < > 3.7 3.9 3.8 3.9   CL 93*  --  100  --   --  100   CO2 21  --  20*  --   --  23   BUN 53*  --  46*  --   --  39*   CREATININE 1.7*  --  1.4*  --   --  1.5*    < > = values in this interval not displayed.      MG:    Recent Labs     01/27/20  1250 01/27/20  1810 01/28/20  0510   MG 2.40 2.10 1.80      PT/INR:   Recent Labs     01/27/20  0343 01/27/20  1250 01/28/20  0510 this time    8. Nutrition: cardiac diet    9. Labs: monitor    10. Post-op Drains/Wires: keeping all for now    12. D/C Goals: DCP following, too early to tell. 12. Continue post-op care of patient in the ICU    Meds:    The patient is on a beta-blocker   The patient is not on an ace-i/ARB- currently requiring pressors   The patient is on a statin   The patient is on oral antiplatelet therapy   ________________________________________________________________________  *Only bolded items apply to this patient at this time:     ? Acidemia (pH < 7.35) ______  ? Alkalemia (pH > 7.45) ______  ? Acidosis (Bicarb <20) ______  ? Electrolyte abnormality (specify)  ? Acute post-op respiratory insufficiency (difficulty weaning from vent)  ? Acute post-op blood loss anemia (hct ? 30)        ? Transfused this admission post-op  ? Cardiac arrythmia:  ? Ventricular Tachycardia     ? Atrial Flutter     ? Atrial fibrillation  ? Acute pulmonary edema due to:      ? Noncardiogenic causes  ? Acute heart failure (Syst, Diast)   ? Encephalopathy (confusion, delirium, altered mental status), (toxic, metabolic)  ? Restraints in use  ? TIA/Stroke (acute, post-op)   ? Acute kidney injury (Cr > 0.5 over baseline, oliguria, ? 5ml/kg/hr x 6 hrs)  ? Acute renal failure (Cr > 2x baseline, Dialysis started)  ? Malnutrition: ? Severe (prealbumin <10, albumin ? 2.5)      ? Moderate (prealbumin <15, albumin <3)  ? SIRS due to non-infectious process (temp > 100.5, wbc > 12, HR > 90, RR>20)  ? Coagulopathy (consumptive, HIT, fibrinolysis) ? FFP or platelets given post-op  ________________________________________________________________________    Claudette Grey, CNP  1/28/2020  9:40 AM  Note reviewed, events of last 24 hours reviewed along with vital signs and I/Os and patient examined. Assessment and plans discussed and are as outlined above.      Reena Holden MD, FACS, [facility], FACCP, [place]

## 2020-01-28 NOTE — PROGRESS NOTES
Physical Therapy/Occupational Therapy    PT/OT orders received, chart reviewed. Per ICU RN, pt has not yet transitioned. Nurse requests holding therapy evaluations at this time. Will continue to follow. Thank you.     [facility]  PT, DPT #[id number]  Geraldine England, OT

## 2020-01-28 NOTE — CARE COORDINATION
[facility]    Referral received from  to follow for home care services.    Unable to staff timely will need to refer to another agency    Ricki Boucher RN, BSN CTN  [facility] [phone]

## 2020-01-28 NOTE — PLAN OF CARE
Problem: Falls - Risk of:  Goal: Will remain free from falls  Description  Will remain free from falls  1/28/2020 0544 by Nimisha Amaro RN  Outcome: Ongoing  1/27/2020 1848 by Suzanne Eckert RN  Outcome: Ongoing     Problem: Falls - Risk of:  Goal: Absence of physical injury  Description  Absence of physical injury  1/28/2020 0544 by Nimisha Amaro RN  Outcome: Ongoing  1/27/2020 1848 by Suzanne Eckert RN  Outcome: Ongoing     Problem: Infection:  Goal: Will remain free from infection  Description  Will remain free from infection  1/28/2020 0544 by Nimisha Amaro RN  Outcome: Ongoing  1/27/2020 1848 by Suzanne Eckert RN  Outcome: Ongoing     Problem: Safety:  Goal: Free from accidental physical injury  Description  Free from accidental physical injury  1/28/2020 0544 by Nimisha Amaro RN  Outcome: Ongoing  1/27/2020 1848 by Suzanne Eckert RN  Outcome: Ongoing     Problem: Pain:  Goal: Patient's pain/discomfort is manageable  Description  Patient's pain/discomfort is manageable  1/28/2020 0544 by Nimisha Amaro RN  Outcome: Ongoing  1/27/2020 1848 by Suzanne Eckert RN  Outcome: Ongoing     Problem: Pain:  Goal: Control of acute pain  Description  Control of acute pain  1/28/2020 0544 by Nimisha Amaro RN  Outcome: Ongoing  1/27/2020 1848 by Suzanne Eckert RN  Outcome: Ongoing     Problem: Pain:  Goal: Control of chronic pain  Description  Control of chronic pain  1/28/2020 0544 by Nimisha Amaro RN  Outcome: Ongoing  1/27/2020 1848 by Suzanne Eckert RN  Outcome: Ongoing     Problem: OXYGENATION/RESPIRATORY FUNCTION  Goal: Patient will maintain patent airway  1/28/2020 0544 by Nimisha Amaro RN  Outcome: Ongoing  1/27/2020 1848 by Suzanne Eckert RN  Outcome: Ongoing     Problem: HEMODYNAMIC STATUS  Goal: Patient has stable vital signs and fluid balance  1/28/2020 0544 by Nimisha Amaro RN  Outcome: Ongoing  1/27/2020 1848 by Suzanne Eckert RN  Outcome: Ongoing

## 2020-01-28 NOTE — PROGRESS NOTES
Inpatient Family Medicine Progress Note      PCP: Eleazar Ceron PA-C    Date of Admission: 1/17/2020    Chief Complaint:  Chest pain    Hospital Course: 62 y.o. female who presented to [facility] with strong chest pain that started around 0930 on 01/17. Started when she was getting ready to go to see Dr Dagoberto Montiel. Felt like something sitting on her chest. Pain was 10/10, associated with shortness of breath and nausea. Took 2 Nitro at home which helped. Admitted to some pain with breathing and when walking x 2 days as well as some back pain. Has a history of previous MI, but she does not remember her previous MI. Denies any recent injuries but admits to falling last week but there was no pain at that time. Has had 3 MIs with 6 stents. Has had CardioMEMs. Was recently admitted with acute on chronic kidney disease, as well as ulcers on both feet. While here in the hospital, patient had cardiac cath that showed 4 vessel disease, CABG x1 performed on 1/27, patient moved to ICU for further care. Subjective: Pt resting comfortably in bed. Pt reports having chest pain s/p CBAG and is requesting pian medication. Otherwise, patient denies any fevers, chills, SOB, nausea, vomiting, diarrhea, constipation, or abdominal pain. Still currently NPO.      Medications:  Reviewed    Infusion Medications    sodium chloride      dextrose       Scheduled Medications    silver sulfADIAZINE   Topical Daily    sodium chloride flush  10 mL Intravenous 2 times per day    acetylcysteine  1,200 mg Oral BID    insulin glargine  5 Units Subcutaneous Nightly    buprenorphine-naloxone  1 Film Sublingual Q12H    lamoTRIgine  150 mg Oral BID    ARIPiprazole  10 mg Oral Daily    aspirin EC  81 mg Oral Daily    atorvastatin  80 mg Oral Nightly    busPIRone  30 mg Oral BID    magnesium oxide  400 mg Oral Daily    pantoprazole  40 mg Oral BID    vitamin D  50,000 Units Oral Weekly    [Held by provider] carvedilol  3.[address]  PGY-2  (available by [facility])

## 2020-01-28 NOTE — CONSULTS
Pharmacy to Dose Vancomycin    Dx: s/p CABG perioperative abx coverage   Pt wt = 113 kg, will use adjusted dosing wt = kg  Estimated Creatinine Clearance: 52 mL/min (A) (based on SCr of 1.5 mg/dL (H)). Vancomycin 1500 mg IVPB x 1 today to complete therapy as per Dr. Christy Godfrey orders. Oconeeefren Byrne.  Gudelia Albright, BCPS   1/28/2020 11:25 AM

## 2020-01-28 NOTE — PROGRESS NOTES
Patient met criteria per open heart protocol to transition to stepdown level of care. Procedures explained to patient. LIJ [place] Jurgen discontinued and obturator inserted. Patient tolerated well and minimal ectopy on monitor. R brachial arterial line discontinued. Manual pressure held for 10 minutes and tegaderm on site. No hematoma, no oozing noted. Pt dangle at bedside then OOB to chair. Patient tolerated well.

## 2020-01-29 LAB
ANION GAP SERPL CALCULATED.3IONS-SCNC: 13 MMOL/L (ref 3–16)
BUN BLDV-MCNC: 32 MG/DL (ref 7–20)
CALCIUM SERPL-MCNC: 8.9 MG/DL (ref 8.3–10.6)
CHLORIDE BLD-SCNC: 94 MMOL/L (ref 99–110)
CO2: 26 MMOL/L (ref 21–32)
CREAT SERPL-MCNC: 1.2 MG/DL (ref 0.6–1.1)
GFR AFRICAN AMERICAN: 56
GFR NON-AFRICAN AMERICAN: 46
GLUCOSE BLD-MCNC: 102 MG/DL (ref 70–99)
GLUCOSE BLD-MCNC: 104 MG/DL (ref 70–99)
GLUCOSE BLD-MCNC: 108 MG/DL (ref 70–99)
GLUCOSE BLD-MCNC: 117 MG/DL (ref 70–99)
GLUCOSE BLD-MCNC: 122 MG/DL (ref 70–99)
GLUCOSE BLD-MCNC: 142 MG/DL (ref 70–99)
GLUCOSE BLD-MCNC: 163 MG/DL (ref 70–99)
GLUCOSE BLD-MCNC: 169 MG/DL (ref 70–99)
HCT VFR BLD CALC: 27.7 % (ref 36–48)
HEMOGLOBIN: 9 G/DL (ref 12–16)
MAGNESIUM: 1.7 MG/DL (ref 1.8–2.4)
MCH RBC QN AUTO: 28.8 PG (ref 26–34)
MCHC RBC AUTO-ENTMCNC: 32.5 G/DL (ref 31–36)
MCV RBC AUTO: 88.5 FL (ref 80–100)
PDW BLD-RTO: 16.7 % (ref 12.4–15.4)
PERFORMED ON: ABNORMAL
PLATELET # BLD: 276 K/UL (ref 135–450)
PMV BLD AUTO: 8.1 FL (ref 5–10.5)
POTASSIUM SERPL-SCNC: 4.2 MMOL/L (ref 3.5–5.1)
RBC # BLD: 3.12 M/UL (ref 4–5.2)
SODIUM BLD-SCNC: 133 MMOL/L (ref 136–145)
WBC # BLD: 14.6 K/UL (ref 4–11)

## 2020-01-29 PROCEDURE — 80048 BASIC METABOLIC PNL TOTAL CA: CPT

## 2020-01-29 PROCEDURE — 6370000000 HC RX 637 (ALT 250 FOR IP): Performed by: THORACIC SURGERY (CARDIOTHORACIC VASCULAR SURGERY)

## 2020-01-29 PROCEDURE — 97110 THERAPEUTIC EXERCISES: CPT

## 2020-01-29 PROCEDURE — 97535 SELF CARE MNGMENT TRAINING: CPT

## 2020-01-29 PROCEDURE — 2140000000 HC CCU INTERMEDIATE R&B

## 2020-01-29 PROCEDURE — 36592 COLLECT BLOOD FROM PICC: CPT

## 2020-01-29 PROCEDURE — 97166 OT EVAL MOD COMPLEX 45 MIN: CPT

## 2020-01-29 PROCEDURE — 85027 COMPLETE CBC AUTOMATED: CPT

## 2020-01-29 PROCEDURE — 6360000002 HC RX W HCPCS: Performed by: THORACIC SURGERY (CARDIOTHORACIC VASCULAR SURGERY)

## 2020-01-29 PROCEDURE — 97116 GAIT TRAINING THERAPY: CPT

## 2020-01-29 PROCEDURE — 2580000003 HC RX 258: Performed by: THORACIC SURGERY (CARDIOTHORACIC VASCULAR SURGERY)

## 2020-01-29 PROCEDURE — 99024 POSTOP FOLLOW-UP VISIT: CPT | Performed by: THORACIC SURGERY (CARDIOTHORACIC VASCULAR SURGERY)

## 2020-01-29 PROCEDURE — 83735 ASSAY OF MAGNESIUM: CPT

## 2020-01-29 PROCEDURE — 6370000000 HC RX 637 (ALT 250 FOR IP): Performed by: NURSE PRACTITIONER

## 2020-01-29 PROCEDURE — 97530 THERAPEUTIC ACTIVITIES: CPT

## 2020-01-29 PROCEDURE — 97162 PT EVAL MOD COMPLEX 30 MIN: CPT

## 2020-01-29 RX ORDER — CLOPIDOGREL BISULFATE 75 MG/1
75 TABLET ORAL DAILY
Status: DISCONTINUED | OUTPATIENT
Start: 2020-01-29 | End: 2020-01-31 | Stop reason: HOSPADM

## 2020-01-29 RX ORDER — ASPIRIN 81 MG/1
81 TABLET ORAL DAILY
Status: DISCONTINUED | OUTPATIENT
Start: 2020-01-29 | End: 2020-01-31 | Stop reason: HOSPADM

## 2020-01-29 RX ADMIN — HEPARIN SODIUM 5000 UNITS: 5000 INJECTION INTRAVENOUS; SUBCUTANEOUS at 21:16

## 2020-01-29 RX ADMIN — Medication 15 ML: at 21:18

## 2020-01-29 RX ADMIN — LAMOTRIGINE 150 MG: 100 TABLET ORAL at 09:31

## 2020-01-29 RX ADMIN — MORPHINE SULFATE 2 MG: 2 INJECTION, SOLUTION INTRAMUSCULAR; INTRAVENOUS at 08:11

## 2020-01-29 RX ADMIN — BUSPIRONE HYDROCHLORIDE 30 MG: 5 TABLET ORAL at 12:55

## 2020-01-29 RX ADMIN — Medication 15 ML: at 09:32

## 2020-01-29 RX ADMIN — CLOPIDOGREL 75 MG: 75 TABLET, FILM COATED ORAL at 12:56

## 2020-01-29 RX ADMIN — OXYCODONE 5 MG: 5 TABLET ORAL at 10:18

## 2020-01-29 RX ADMIN — FAMOTIDINE 20 MG: 20 TABLET, FILM COATED ORAL at 09:31

## 2020-01-29 RX ADMIN — ACETAMINOPHEN 1000 MG: 500 TABLET ORAL at 05:32

## 2020-01-29 RX ADMIN — OXYCODONE 10 MG: 5 TABLET ORAL at 04:46

## 2020-01-29 RX ADMIN — OXYCODONE 5 MG: 5 TABLET ORAL at 23:45

## 2020-01-29 RX ADMIN — MORPHINE SULFATE 4 MG: 4 INJECTION, SOLUTION INTRAMUSCULAR; INTRAVENOUS at 14:57

## 2020-01-29 RX ADMIN — OXYCODONE 10 MG: 5 TABLET ORAL at 17:48

## 2020-01-29 RX ADMIN — CEFAZOLIN SODIUM 2 G: 10 INJECTION, POWDER, FOR SOLUTION INTRAVENOUS at 01:47

## 2020-01-29 RX ADMIN — FUROSEMIDE 40 MG: 10 INJECTION, SOLUTION INTRAMUSCULAR; INTRAVENOUS at 09:30

## 2020-01-29 RX ADMIN — METOPROLOL TARTRATE 25 MG: 25 TABLET ORAL at 09:31

## 2020-01-29 RX ADMIN — METOPROLOL TARTRATE 25 MG: 25 TABLET ORAL at 21:16

## 2020-01-29 RX ADMIN — DOCUSATE SODIUM 100 MG: 100 CAPSULE, LIQUID FILLED ORAL at 09:31

## 2020-01-29 RX ADMIN — MUPIROCIN: 20 OINTMENT TOPICAL at 09:31

## 2020-01-29 RX ADMIN — INSULIN LISPRO 1 UNITS: 100 INJECTION, SOLUTION INTRAVENOUS; SUBCUTANEOUS at 12:52

## 2020-01-29 RX ADMIN — ATORVASTATIN CALCIUM 80 MG: 80 TABLET, FILM COATED ORAL at 21:16

## 2020-01-29 RX ADMIN — HEPARIN SODIUM 5000 UNITS: 5000 INJECTION INTRAVENOUS; SUBCUTANEOUS at 12:55

## 2020-01-29 RX ADMIN — MUPIROCIN: 20 OINTMENT TOPICAL at 21:18

## 2020-01-29 RX ADMIN — Medication 10 ML: at 21:24

## 2020-01-29 RX ADMIN — DOCUSATE SODIUM 100 MG: 100 CAPSULE, LIQUID FILLED ORAL at 21:16

## 2020-01-29 RX ADMIN — INSULIN GLARGINE 17 UNITS: 100 INJECTION, SOLUTION SUBCUTANEOUS at 21:15

## 2020-01-29 RX ADMIN — LAMOTRIGINE 150 MG: 100 TABLET ORAL at 21:16

## 2020-01-29 RX ADMIN — BENZTROPINE MESYLATE 1 MG: 1 TABLET ORAL at 09:31

## 2020-01-29 RX ADMIN — MORPHINE SULFATE 2 MG: 2 INJECTION, SOLUTION INTRAMUSCULAR; INTRAVENOUS at 21:15

## 2020-01-29 RX ADMIN — HEPARIN SODIUM 5000 UNITS: 5000 INJECTION INTRAVENOUS; SUBCUTANEOUS at 05:32

## 2020-01-29 RX ADMIN — BUSPIRONE HYDROCHLORIDE 30 MG: 5 TABLET ORAL at 21:15

## 2020-01-29 RX ADMIN — ASPIRIN 81 MG: 81 TABLET, COATED ORAL at 09:31

## 2020-01-29 RX ADMIN — MAGNESIUM SULFATE HEPTAHYDRATE 2 G: 40 INJECTION, SOLUTION INTRAVENOUS at 06:45

## 2020-01-29 RX ADMIN — MELATONIN TAB 5 MG 5 MG: 5 TAB at 23:45

## 2020-01-29 RX ADMIN — Medication 250 MG: at 09:31

## 2020-01-29 RX ADMIN — INSULIN LISPRO 1 UNITS: 100 INJECTION, SOLUTION INTRAVENOUS; SUBCUTANEOUS at 21:18

## 2020-01-29 RX ADMIN — INSULIN LISPRO 1 UNITS: 100 INJECTION, SOLUTION INTRAVENOUS; SUBCUTANEOUS at 09:30

## 2020-01-29 RX ADMIN — Medication 250 MG: at 21:15

## 2020-01-29 ASSESSMENT — PAIN DESCRIPTION - LOCATION
LOCATION: CHEST
LOCATION: CHEST;STERNUM
LOCATION: CHEST

## 2020-01-29 ASSESSMENT — PAIN DESCRIPTION - FREQUENCY
FREQUENCY: CONTINUOUS

## 2020-01-29 ASSESSMENT — PAIN SCALES - WONG BAKER
WONGBAKER_NUMERICALRESPONSE: 0
WONGBAKER_NUMERICALRESPONSE: 0

## 2020-01-29 ASSESSMENT — PAIN SCALES - GENERAL
PAINLEVEL_OUTOF10: 8
PAINLEVEL_OUTOF10: 9
PAINLEVEL_OUTOF10: 10
PAINLEVEL_OUTOF10: 9
PAINLEVEL_OUTOF10: 7
PAINLEVEL_OUTOF10: 0
PAINLEVEL_OUTOF10: 9
PAINLEVEL_OUTOF10: 9
PAINLEVEL_OUTOF10: 8
PAINLEVEL_OUTOF10: 9
PAINLEVEL_OUTOF10: 10
PAINLEVEL_OUTOF10: 9
PAINLEVEL_OUTOF10: 9

## 2020-01-29 ASSESSMENT — PAIN DESCRIPTION - PAIN TYPE
TYPE: SURGICAL PAIN

## 2020-01-29 ASSESSMENT — PAIN DESCRIPTION - ONSET
ONSET: ON-GOING
ONSET: ON-GOING

## 2020-01-29 ASSESSMENT — PAIN - FUNCTIONAL ASSESSMENT
PAIN_FUNCTIONAL_ASSESSMENT: PREVENTS OR INTERFERES SOME ACTIVE ACTIVITIES AND ADLS
PAIN_FUNCTIONAL_ASSESSMENT: PREVENTS OR INTERFERES SOME ACTIVE ACTIVITIES AND ADLS

## 2020-01-29 ASSESSMENT — PAIN DESCRIPTION - ORIENTATION
ORIENTATION: MID

## 2020-01-29 ASSESSMENT — PAIN DESCRIPTION - PROGRESSION
CLINICAL_PROGRESSION: NOT CHANGED
CLINICAL_PROGRESSION: NOT CHANGED

## 2020-01-29 ASSESSMENT — PAIN DESCRIPTION - DESCRIPTORS
DESCRIPTORS: ACHING;SORE
DESCRIPTORS: ACHING
DESCRIPTORS: ACHING

## 2020-01-29 NOTE — PROGRESS NOTES
CVTS Cardiothoracic Progress Note:                                CC:  Post op follow up     Surgery: 1/27/20 Urgent coronary artery bypass grafting surgery x1 with pedicled left internal mammary artery to the LAD. Cardiopulmonary bypass with on-pump beating-heart technique, no cross-clamp. Transesophageal echo. Epiaortic ultrasound. [place] Ell-Jurgen catheter placement. Doppler verification of grafts. Bilateral five-level intercostal nerve block with Exparel. Platelet gel application. Sternal plating. Vas-Cath placement. Hospital course:   1/28 sitting up in bed, awake, alert and conversational. Reports discomfort in left ankle, feels \"like it needs to pop\" and requesting warm compress. 1/29 Up in chair, states she feels better today, despite being a bit more difficult to get out of bed.     Past Medical History:   Diagnosis Date    Anxiety and depression     CAD (coronary artery disease) 01/2018    Cardiomyopathy ([address]     CHF (congestive heart failure) (HCC)     COPD (chronic obstructive pulmonary disease) (HCC)     Diabetes mellitus ([address]     GERD (gastroesophageal reflux disease)     Hyperlipidemia     Hypertension     Hypotension     MI (myocardial infarction) ([address]     Peripheral neuropathy     Peripheral vascular disease ([address]     Pulmonary HTN ([address]     Reflux     Sleep apnea     has never done sleep study;does not use CPAP    Wears glasses     for reading        Past Surgical History:   Procedure Laterality Date    ARTERIAL BYPASS SURGRY Left 01/06/2016    Axillary/Subclavian to Brachial Bypass w/reversed SVG    CARDIAC CATHETERIZATION  03/27/2018    Dr. Sergio Garcia - at [address]  01/20/2020    Dr. Victorino Richards      pancreatitis post surgery    CORONARY ANGIOPLASTY WITH STENT PLACEMENT  03/16/2018    MELODY- 3.5 x 38 and 3.5 x 20 to Cx    CORONARY ANGIOPLASTY WITH STENT PLACEMENT  01/03/2018    MELODY- 3.0 x 38 and 2.75 x 26 and necrosis ([address]    Claudication in peripheral vascular disease ([address]    Hypotension    COPD (chronic obstructive pulmonary disease) (HCC)    Cellulitis    SHAUNNA (acute kidney injury) ([address]    Acute renal insufficiency    Hypervolemia    Vitamin D deficiency    Acute kidney injury ([address]    Cellulitis of both feet    Acute on chronic systolic congestive heart failure (HCC)    Cellulitis of foot, left    Peripheral edema    Diabetic ulcer of toe of right foot associated with type 2 diabetes mellitus ([address]    Diabetic ulcer of left foot associated with type 2 diabetes mellitus ([address]    Hypoglycemia associated with type 2 diabetes mellitus ([address]    Unstable angina pectoris ([address]    Cardiomyopathy, ischemic    Acute anxiety    Diabetes mellitus ([address]    Dyslipidemia        Vital Signs: /75   Pulse 99   Temp 98.2 °F (36.8 °C) (Oral)   Resp 19   Ht 5' 4.5\" (1.638 m)   Wt 244 lb 4.8 oz (110.8 kg)   LMP 10/24/2012   SpO2 92%   BMI 41.29 kg/m²  O2 Flow Rate (L/min): 0 L/min     Admission Weight: Weight: 245 lb (111.1 kg)    Weight on 1/27 (113 kg) Pre-op   Weight on 1/28 (113.3 kg)  1/29  110.8 kg    Intake/Output:     Intake/Output Summary (Last 24 hours) at 1/29/2020 1115  Last data filed at 1/29/2020 0802  Gross per 24 hour   Intake 2845 ml   Output 2322 ml   Net 523 ml      Extubation Time: 1/27 @ 14:28  Transition Time: 1/28 @ 14:02    LABORATORY DATA:     CBC:   Recent Labs     01/27/20  1250  01/28/20  0014 01/28/20  0510 01/29/20  0527   WBC 26.5*  --   --  17.7* 14.6*   HGB 9.2*   < > 9.2* 8.9* 9.0*   HCT 28.4*  --   --  27.2* 27.7*   MCV 88.6  --   --  88.1 88.5     --   --  238 276    < > = values in this interval not displayed.      BMP:   Recent Labs     01/27/20  1250  01/28/20  0018 01/28/20  0510 01/29/20  0527   *  --   --  136 133*   K 3.7   < > 3.8 3.9 4.2     --   --  100 94*   CO2 20*  --   --  23 26   BUN 46*  --   --  39* 32*   CREATININE 1.4*  --   -- maneuvers- IS, PT/OT, oobtc    3. Neurology: analgesia as needed    4. Nephrology: fluid management; diurese as tolerated. Continue 40 mg IV lasix BID. Cr improving, down to 1.2 today (was 1.5 yesterday). 5. Endocrinology: d/c insulin drip, continue SSI. 6. Hematology: acute blood loss anemia; expected, stable, monitor. 7. Microbiology: nothing at this time    8. Nutrition: cardiac diet    9. Labs: monitor    10. Post-op Drains/Wires: will remove TPW's this morning. 4 hours later, and after ambulation, will remove chest tubes as they meet criteria. 12. D/C Goals: DCP following, likely home in next 2-3 days. 12. Continue post-op care of patient in the ICU    Meds:    The patient is on a beta-blocker   The patient is not on an ace-i/ARB- 2/2 relative hypotension   The patient is on a statin   The patient is on oral antiplatelet therapy   ________________________________________________________________________  *Only bolded items apply to this patient at this time:     ? Acidemia (pH < 7.35) ______  ? Alkalemia (pH > 7.45) ______  ? Acidosis (Bicarb <20) ______  ? Electrolyte abnormality (specify)  ? Acute post-op respiratory insufficiency (difficulty weaning from vent)  ? Acute post-op blood loss anemia (hct ? 30)        ? Transfused this admission post-op  ? Cardiac arrythmia:  ? Ventricular Tachycardia     ? Atrial Flutter     ? Atrial fibrillation  ? Acute pulmonary edema due to:      ? Noncardiogenic causes  ? Acute heart failure (Syst, Diast)   ? Encephalopathy (confusion, delirium, altered mental status), (toxic, metabolic)  ? Restraints in use  ? TIA/Stroke (acute, post-op)   ? Acute kidney injury (Cr > 0.5 over baseline, oliguria, ? 5ml/kg/hr x 6 hrs)  ? Acute renal failure (Cr > 2x baseline, Dialysis started)  ? Malnutrition: ? Severe (prealbumin <10, albumin ? 2.5)      ? Moderate (prealbumin <15, albumin <3)  ? SIRS due to non-infectious process (temp > 100.5, wbc > 12, HR > 90, RR>20)  ? Coagulopathy (consumptive, HIT, fibrinolysis) ? FFP or platelets given post-op  ________________________________________________________________________    Manoj Quintana, CNP  1/29/2020  11:15 AM  Note reviewed, events of last 24 hours reviewed along with vital signs and I/Os and patient examined. Assessment and plans discussed and are as outlined above.      Alana Crain MD, FACS, [facility], FACCP, [place]

## 2020-01-29 NOTE — PROGRESS NOTES
Criteria met per clinical pathway to remove epicardial pacing wires & MD order received. Procedure explained to patient. INR is less than 2.0 (if on coumadin). Pacing wire site within normal limits. Cleansed site with Chloroprep. Atrial anf Ventricular pacing wires removed per policy without difficulty. Dry sterile dressing applied. Vital signs will be monitored and recorded per open heart protocol (every 15 minutes X 2, every 30 minutes X 1, and every hour X 1). Patient tolerated procedure well, heart tones easily auscultated, oxygen saturation within normal limits. Signs/symtoms for cardiac tamponade will be monitored closely.

## 2020-01-29 NOTE — PROGRESS NOTES
carvedilol  3.125 mg Oral BID    benztropine  1 mg Oral Daily    saccharomyces boulardii  250 mg Oral BID    sodium chloride flush  10 mL Intravenous 2 times per day    tiotropium  18 mcg Inhalation Daily    insulin lispro  0-12 Units Subcutaneous TID WC    insulin lispro  0-6 Units Subcutaneous Nightly    nicotine  1 patch Transdermal Daily     PRN Meds: melatonin, sodium chloride flush, acetaminophen, nitroGLYCERIN, sodium chloride flush, magnesium hydroxide, ondansetron, glucose, dextrose, glucagon (rDNA), dextrose, albuterol sulfate HFA, acetaminophen      Intake/Output Summary (Last 24 hours) at 1/20/2020 1823  Last data filed at 1/20/2020 1235  Gross per 24 hour   Intake --   Output 2375 ml   Net -2375 ml       Physical Exam Performed:    /75   Pulse 85   Temp 98.4 °F (36.9 °C) (Oral)   Resp 16   Ht 5' 4.5\" (1.638 m)   Wt 241 lb 11.2 oz (109.6 kg)   LMP 10/24/2012   SpO2 98%   BMI 40.85 kg/m²     Physical Exam  Vitals signs reviewed. Constitutional:       General: She is not in acute distress. Appearance: Normal appearance. She is obese. She is not ill-appearing. Comments: Tired appearing. Answering questions appropriately. HENT:      Head: Normocephalic and atraumatic. Cardiovascular:      Rate and Rhythm: Normal rate and regular rhythm. Heart sounds: No murmur. No friction rub. No gallop. Comments: Chest tubes in place with moderate amount of sanguinous output noted   Pulmonary:      Effort: Pulmonary effort is normal.      Breath sounds: No wheezing, rhonchi or rales. Abdominal:      General: There is no distension. Palpations: Abdomen is soft. Genitourinary:     Comments: Lees catheter in place with light yellow urine noted  Musculoskeletal:      Right lower leg: Edema present. Left lower leg: Edema present. Skin:     General: Skin is warm and dry. Neurological:      Comments:  Answering questions appropriately         Labs:   Recent Labs 01/18/20  0842 01/19/20  0640 01/20/20  0748   WBC 9.4 10.7 13.0*   HGB 10.0* 10.0* 9.9*   HCT 30.2* 29.8* 30.6*    404 402     Recent Labs     01/19/20  0640 01/20/20  0209 01/20/20  0748   * 123* 125*   K 3.8 4.3 4.5   CL 85* 81* 84*   CO2 29 29 27   BUN 53* 51* 49*   CREATININE 2.0* 2.1* 1.9*   CALCIUM 9.2 9.4 9.6     Recent Labs     01/18/20  0842 01/19/20  0640 01/20/20  0748   AST 11* 9* 6*   ALT <5* 7* 6*   BILITOT 0.4 0.4 0.5   ALKPHOS 97 96 95     No results for input(s): INR in the last 72 hours. Recent Labs     01/17/20 2037   TROPONINI 0.01       Urinalysis:      Lab Results   Component Value Date    NITRU Negative 01/17/2020    WBCUA 3-5 09/28/2019    BACTERIA Rare 09/28/2019    RBCUA None seen 09/28/2019    BLOODU Negative 01/17/2020    SPECGRAV 1.010 01/17/2020    GLUCOSEU Negative 01/17/2020       Radiology:  XR CHEST STANDARD (2 VW)   Final Result   Cardiomegaly without evidence of acute cardiopulmonary process. Transthoracic Echo (1/22/2020)  Technically difficult examination secondary to habitus. The left ventricular systolic function is severely reduced with an ejection fraction of 25%. There is severe hypokinesis of the anterior and apical walls consistent with infarction within the territory of the left anterior descending artery. Grade II diastolic dysfunction with elevated left ventricular filling pressure. Moderate to severe mitral regurgitation. Mild tricuspid regurgitation. Is estimated at 53 mmHg consistent with moderate pulmonary hypertension (Right atrial pressure of 3 mmHg). If clinically necessary, consider URI for better evaluation of mitral regurgitation. Transesophageal Echo (1/24/2020)  Ejection fraction is visually estimated at 35-40%. Moderate to severe mitral regurgitation with multiple jets visualized. ERO estimated at 0.29 cm2. Mild tricuspid regurgitation.   The left atrial appendage shows evidence of thrombus formation and low flow

## 2020-01-29 NOTE — PROGRESS NOTES
WFL  LLE AROM : WFL  Strength RLE: WFL  Strength LLE: WFL     Bed mobility  Supine to Sit: Unable to assess(pt up in chair upon entry of therapy staff)  Sit to Supine: 2 Person assistance(modA x 2 , to lift LE's into bed and assist with trunk)  Scooting: Minimal assistance(to scoot sideways and up in bed)     Transfers  Sit to Stand: Minimal Assistance(min cues needed to maintain sternal precautions and for proper technique)  Stand to sit: Contact guard assistance  Bed to Chair: Contact guard assistance(using 4WW, moving from chair>bed)     Ambulation  Surface: level tile  Device: Rollator  Assistance: Contact guard assistance  Quality of Gait: Pt amb with slow but steady tamir using partial step-through gait pattern. Requires support of 4WW for added stability. Gait Deviations: Slow Tamir;Decreased step length;Decreased step height  Distance: x 80 feet  Comments: Amb distance limited by c/o fatigue/increased SOB. Balance  Posture: Fair  Sitting - Static: Good  Sitting - Dynamic: Good  Standing - Static: Good;-  Standing - Dynamic: Fair;+(using [id number])     Exercises  Gluteal Sets: x 10 bilat  Knee Long Arc Quad: x 10 BLE  Ankle Pumps: Ankles circles and ankle pumps -- x 15 BLE each (also performed 10 reps of ankle INV/EVER AAROM on L with gentle stretch into end-range INV)     Plan   Times per week: 5-7x/week in acute care  Times per day: Daily  Specific instructions for Next Treatment: Progress ther ex and mobility as tolerated  Current Treatment Recommendations: Strengthening, Gait Training, Stair training, Balance Training, Functional Mobility Training, Endurance Training, Transfer Training, Safety Education & Training, Patient/Caregiver Education & Training, Equipment Evaluation, Education, & procurement, Positioning, Home Exercise Program, ROM  Safety Devices:  All fall risk precautions in place, Call light within reach, Gait belt, Patient at risk for falls, Left in bed, Bed alarm in place, Nurse

## 2020-01-30 LAB
ANION GAP SERPL CALCULATED.3IONS-SCNC: 11 MMOL/L (ref 3–16)
BLOOD BANK DISPENSE STATUS: NORMAL
BLOOD BANK DISPENSE STATUS: NORMAL
BLOOD BANK PRODUCT CODE: NORMAL
BLOOD BANK PRODUCT CODE: NORMAL
BPU ID: NORMAL
BPU ID: NORMAL
BUN BLDV-MCNC: 38 MG/DL (ref 7–20)
CALCIUM SERPL-MCNC: 8.9 MG/DL (ref 8.3–10.6)
CHLORIDE BLD-SCNC: 92 MMOL/L (ref 99–110)
CO2: 27 MMOL/L (ref 21–32)
CREAT SERPL-MCNC: 1.4 MG/DL (ref 0.6–1.1)
DESCRIPTION BLOOD BANK: NORMAL
DESCRIPTION BLOOD BANK: NORMAL
GFR AFRICAN AMERICAN: 47
GFR NON-AFRICAN AMERICAN: 39
GLUCOSE BLD-MCNC: 139 MG/DL (ref 70–99)
GLUCOSE BLD-MCNC: 154 MG/DL (ref 70–99)
GLUCOSE BLD-MCNC: 166 MG/DL (ref 70–99)
GLUCOSE BLD-MCNC: 178 MG/DL (ref 70–99)
GLUCOSE BLD-MCNC: 189 MG/DL (ref 70–99)
HCT VFR BLD CALC: 26.4 % (ref 36–48)
HEMOGLOBIN: 8.5 G/DL (ref 12–16)
MAGNESIUM: 2 MG/DL (ref 1.8–2.4)
MCH RBC QN AUTO: 28.5 PG (ref 26–34)
MCHC RBC AUTO-ENTMCNC: 32.1 G/DL (ref 31–36)
MCV RBC AUTO: 88.6 FL (ref 80–100)
PDW BLD-RTO: 16.6 % (ref 12.4–15.4)
PERFORMED ON: ABNORMAL
PLATELET # BLD: 264 K/UL (ref 135–450)
PMV BLD AUTO: 8.1 FL (ref 5–10.5)
POTASSIUM SERPL-SCNC: 4.3 MMOL/L (ref 3.5–5.1)
RBC # BLD: 2.98 M/UL (ref 4–5.2)
SODIUM BLD-SCNC: 130 MMOL/L (ref 136–145)
WBC # BLD: 13.5 K/UL (ref 4–11)

## 2020-01-30 PROCEDURE — 6370000000 HC RX 637 (ALT 250 FOR IP): Performed by: NURSE PRACTITIONER

## 2020-01-30 PROCEDURE — 80048 BASIC METABOLIC PNL TOTAL CA: CPT

## 2020-01-30 PROCEDURE — 36592 COLLECT BLOOD FROM PICC: CPT

## 2020-01-30 PROCEDURE — 97116 GAIT TRAINING THERAPY: CPT

## 2020-01-30 PROCEDURE — 2580000003 HC RX 258: Performed by: THORACIC SURGERY (CARDIOTHORACIC VASCULAR SURGERY)

## 2020-01-30 PROCEDURE — 83735 ASSAY OF MAGNESIUM: CPT

## 2020-01-30 PROCEDURE — 6360000002 HC RX W HCPCS: Performed by: THORACIC SURGERY (CARDIOTHORACIC VASCULAR SURGERY)

## 2020-01-30 PROCEDURE — 6370000000 HC RX 637 (ALT 250 FOR IP): Performed by: THORACIC SURGERY (CARDIOTHORACIC VASCULAR SURGERY)

## 2020-01-30 PROCEDURE — 97110 THERAPEUTIC EXERCISES: CPT

## 2020-01-30 PROCEDURE — 85027 COMPLETE CBC AUTOMATED: CPT

## 2020-01-30 PROCEDURE — 2140000000 HC CCU INTERMEDIATE R&B

## 2020-01-30 PROCEDURE — 99024 POSTOP FOLLOW-UP VISIT: CPT | Performed by: THORACIC SURGERY (CARDIOTHORACIC VASCULAR SURGERY)

## 2020-01-30 RX ADMIN — MORPHINE SULFATE 2 MG: 2 INJECTION, SOLUTION INTRAMUSCULAR; INTRAVENOUS at 03:32

## 2020-01-30 RX ADMIN — OXYCODONE 5 MG: 5 TABLET ORAL at 06:53

## 2020-01-30 RX ADMIN — CLOPIDOGREL 75 MG: 75 TABLET, FILM COATED ORAL at 09:09

## 2020-01-30 RX ADMIN — BENZTROPINE MESYLATE 1 MG: 1 TABLET ORAL at 09:09

## 2020-01-30 RX ADMIN — Medication 10 ML: at 21:01

## 2020-01-30 RX ADMIN — HEPARIN SODIUM 5000 UNITS: 5000 INJECTION INTRAVENOUS; SUBCUTANEOUS at 14:59

## 2020-01-30 RX ADMIN — FAMOTIDINE 20 MG: 20 TABLET, FILM COATED ORAL at 09:08

## 2020-01-30 RX ADMIN — Medication 15 ML: at 09:10

## 2020-01-30 RX ADMIN — HEPARIN SODIUM 5000 UNITS: 5000 INJECTION INTRAVENOUS; SUBCUTANEOUS at 20:57

## 2020-01-30 RX ADMIN — BUSPIRONE HYDROCHLORIDE 30 MG: 5 TABLET ORAL at 21:00

## 2020-01-30 RX ADMIN — HEPARIN SODIUM 5000 UNITS: 5000 INJECTION INTRAVENOUS; SUBCUTANEOUS at 05:54

## 2020-01-30 RX ADMIN — LAMOTRIGINE 150 MG: 100 TABLET ORAL at 09:09

## 2020-01-30 RX ADMIN — Medication 250 MG: at 09:09

## 2020-01-30 RX ADMIN — OXYCODONE 10 MG: 5 TABLET ORAL at 22:50

## 2020-01-30 RX ADMIN — ATORVASTATIN CALCIUM 80 MG: 80 TABLET, FILM COATED ORAL at 20:57

## 2020-01-30 RX ADMIN — DOCUSATE SODIUM 100 MG: 100 CAPSULE, LIQUID FILLED ORAL at 09:08

## 2020-01-30 RX ADMIN — ASPIRIN 81 MG: 81 TABLET, COATED ORAL at 09:09

## 2020-01-30 RX ADMIN — MUPIROCIN: 20 OINTMENT TOPICAL at 09:15

## 2020-01-30 RX ADMIN — INSULIN GLARGINE 17 UNITS: 100 INJECTION, SOLUTION SUBCUTANEOUS at 20:57

## 2020-01-30 RX ADMIN — LAMOTRIGINE 150 MG: 100 TABLET ORAL at 21:08

## 2020-01-30 RX ADMIN — Medication 15 ML: at 21:00

## 2020-01-30 RX ADMIN — OXYCODONE 10 MG: 5 TABLET ORAL at 11:49

## 2020-01-30 RX ADMIN — METOPROLOL TARTRATE 25 MG: 25 TABLET ORAL at 20:57

## 2020-01-30 RX ADMIN — Medication 250 MG: at 20:57

## 2020-01-30 RX ADMIN — INSULIN LISPRO 1 UNITS: 100 INJECTION, SOLUTION INTRAVENOUS; SUBCUTANEOUS at 17:04

## 2020-01-30 RX ADMIN — INSULIN LISPRO 1 UNITS: 100 INJECTION, SOLUTION INTRAVENOUS; SUBCUTANEOUS at 13:08

## 2020-01-30 RX ADMIN — METOPROLOL TARTRATE 25 MG: 25 TABLET ORAL at 09:09

## 2020-01-30 RX ADMIN — DIPHENHYDRAMINE HCL 25 MG: 25 TABLET ORAL at 20:57

## 2020-01-30 RX ADMIN — DOCUSATE SODIUM 100 MG: 100 CAPSULE, LIQUID FILLED ORAL at 20:57

## 2020-01-30 RX ADMIN — MUPIROCIN: 20 OINTMENT TOPICAL at 21:00

## 2020-01-30 RX ADMIN — INSULIN LISPRO 1 UNITS: 100 INJECTION, SOLUTION INTRAVENOUS; SUBCUTANEOUS at 09:10

## 2020-01-30 RX ADMIN — INSULIN LISPRO 1 UNITS: 100 INJECTION, SOLUTION INTRAVENOUS; SUBCUTANEOUS at 20:57

## 2020-01-30 RX ADMIN — BUSPIRONE HYDROCHLORIDE 30 MG: 5 TABLET ORAL at 09:08

## 2020-01-30 RX ADMIN — Medication 10 ML: at 09:10

## 2020-01-30 RX ADMIN — MORPHINE SULFATE 2 MG: 2 INJECTION, SOLUTION INTRAMUSCULAR; INTRAVENOUS at 07:56

## 2020-01-30 ASSESSMENT — PAIN SCALES - GENERAL
PAINLEVEL_OUTOF10: 7
PAINLEVEL_OUTOF10: 6
PAINLEVEL_OUTOF10: 0
PAINLEVEL_OUTOF10: 8
PAINLEVEL_OUTOF10: 7
PAINLEVEL_OUTOF10: 0
PAINLEVEL_OUTOF10: 7
PAINLEVEL_OUTOF10: 8
PAINLEVEL_OUTOF10: 7

## 2020-01-30 ASSESSMENT — PAIN SCALES - WONG BAKER
WONGBAKER_NUMERICALRESPONSE: 0
WONGBAKER_NUMERICALRESPONSE: 0

## 2020-01-30 ASSESSMENT — PAIN DESCRIPTION - ORIENTATION
ORIENTATION: MID
ORIENTATION: MID

## 2020-01-30 ASSESSMENT — PAIN DESCRIPTION - PAIN TYPE
TYPE: SURGICAL PAIN
TYPE: SURGICAL PAIN

## 2020-01-30 ASSESSMENT — PAIN DESCRIPTION - FREQUENCY: FREQUENCY: CONTINUOUS

## 2020-01-30 ASSESSMENT — PAIN DESCRIPTION - DESCRIPTORS: DESCRIPTORS: ACHING;SORE

## 2020-01-30 ASSESSMENT — PAIN DESCRIPTION - LOCATION
LOCATION: CHEST;STERNUM
LOCATION: CHEST

## 2020-01-30 NOTE — PROGRESS NOTES
Inpatient Family Medicine Progress Note      PCP: Hussein Merrill PA-C    Date of Admission: 1/17/2020    Chief Complaint:  Chest pain    Hospital Course: 62 y.o. female who presented to [facility] with strong chest pain that started around 0930 on 01/17. Started when she was getting ready to go to see Dr Roberta Lynn. Corn like something sitting on her chest. Pain was 10/10, associated with shortness of breath and nausea. Took 2 Nitro at home which helped. Admitted to some pain with breathing and when walking x 2 days as well as some back pain. Has a history of previous MI, but she does not remember her previous MI. Denies any recent injuries but admits to falling last week but there was no pain at that time. Has had 3 MIs with 6 stents. Has had CardioMEMs. Was recently admitted with acute on chronic kidney disease, as well as ulcers on both feet. While here in the hospital, patient had cardiac cath that showed 4 vessel disease, CABG x1 performed on 1/27, patient moved to ICU for further care. Subjective: Pt resting comfortably in chair. In good spirits. States she's doing well and able to get around and go to the bathroom without assistance. States that CT believes she can go home tomorrow. Otherwise, patient denies any fevers, chills, SOB, nausea, vomiting, diarrhea, constipation, or abdominal pain.       Medications:  Reviewed    Infusion Medications    sodium chloride      dextrose       Scheduled Medications    silver sulfADIAZINE   Topical Daily    sodium chloride flush  10 mL Intravenous 2 times per day    acetylcysteine  1,200 mg Oral BID    insulin glargine  5 Units Subcutaneous Nightly    buprenorphine-naloxone  1 Film Sublingual Q12H    lamoTRIgine  150 mg Oral BID    ARIPiprazole  10 mg Oral Daily    aspirin EC  81 mg Oral Daily    atorvastatin  80 mg Oral Nightly    busPIRone  30 mg Oral BID    magnesium oxide  400 mg Oral Daily    pantoprazole  40 mg Oral BID    vitamin Left lower leg: Edema (improved from yesterday) present. Skin:     General: Skin is warm and dry. Neurological:      Comments: Answering questions appropriately         Labs:   Recent Labs     01/18/20  0842 01/19/20  0640 01/20/20  0748   WBC 9.4 10.7 13.0*   HGB 10.0* 10.0* 9.9*   HCT 30.2* 29.8* 30.6*    404 402     Recent Labs     01/19/20  0640 01/20/20  0209 01/20/20  0748   * 123* 125*   K 3.8 4.3 4.5   CL 85* 81* 84*   CO2 29 29 27   BUN 53* 51* 49*   CREATININE 2.0* 2.1* 1.9*   CALCIUM 9.2 9.4 9.6     Recent Labs     01/18/20  0842 01/19/20  0640 01/20/20  0748   AST 11* 9* 6*   ALT <5* 7* 6*   BILITOT 0.4 0.4 0.5   ALKPHOS 97 96 95     No results for input(s): INR in the last 72 hours. Recent Labs     01/17/20 2037   TROPONINI 0.01       Urinalysis:      Lab Results   Component Value Date    NITRU Negative 01/17/2020    WBCUA 3-5 09/28/2019    BACTERIA Rare 09/28/2019    RBCUA None seen 09/28/2019    BLOODU Negative 01/17/2020    SPECGRAV 1.010 01/17/2020    GLUCOSEU Negative 01/17/2020       Radiology:  XR CHEST STANDARD (2 VW)   Final Result   Cardiomegaly without evidence of acute cardiopulmonary process. Transthoracic Echo (1/22/2020)  Technically difficult examination secondary to habitus. The left ventricular systolic function is severely reduced with an ejection fraction of 25%. There is severe hypokinesis of the anterior and apical walls consistent with infarction within the territory of the left anterior descending artery. Grade II diastolic dysfunction with elevated left ventricular filling pressure. Moderate to severe mitral regurgitation. Mild tricuspid regurgitation. Is estimated at 53 mmHg consistent with moderate pulmonary hypertension (Right atrial pressure of 3 mmHg). If clinically necessary, consider URI for better evaluation of mitral regurgitation. Transesophageal Echo (1/24/2020)  Ejection fraction is visually estimated at 35-40%. Moderate to severe mitral regurgitation with multiple jets visualized. ERO estimated at 0.29 cm2. Mild tricuspid regurgitation. The left atrial appendage shows evidence of thrombus formation and low flow velocities. Moderate amount of plaque in the aorta    Assessment/Plan:    Active Hospital Problems    Diagnosis Date Noted    CAD (coronary artery disease) [I25.10]      Priority: High    Peripheral edema [R60.9] 01/17/2020     Priority: Medium    Hypokalemia [E87.6] 04/08/2019     Priority: Low    CKD (chronic kidney disease) stage 3, GFR 30-59 ml/min (HCC) [N18.3] 01/09/2019     Priority: Low    Hyponatremia [E87.1] 05/23/2018     Priority: Low    Diabetic ulcer of toe of right foot associated with type 2 diabetes mellitus ([address] [J95.282, L97.519] 01/18/2020    Diabetic ulcer of left foot associated with type 2 diabetes mellitus ([address] [E44.731, L97.529] 01/18/2020    Hypoglycemia associated with type 2 diabetes mellitus ([address] [E11.649] 01/18/2020       Coronary artery disease  - POD#3 s/p CABG x 1 performed, doing only 1 vessel reportedly due to concerns of SHAUNNA. - CTS for primary management s/p open heart surgery. Peripheral edema  - Stable. Secondary to CKD. - Nephrology following.  - Nephrology and CTS to recommend fluid management post CABG. - Daily free water restriction. Chronic kidney disease, stage 3, recent SHAUNNA  - Was recently admitted for SHAUNNA with last creatine on discharge 3.2 (1/12/20). - Creatinine 1.4 --> 1.5 --> 1.2 --> 1.4 today. - Nephrology following.  - Avoid nephrotoxins. Hyponatremia  - Mild. - Na 131 --> 136 --> 133 -->130 today. - Nephrology following. Hypoglycemia (resolved)  - Glucose has been running low-to-mid 100's in the last 24 hours. - Back on Lantus and Humalog SSI.  - Nephrology and Surgery continue to manage. Diabetic Foot Ulcers  - Wound care to manage. Does not seem infected or gangrenous.     DVT Prophylaxis: Heparin    Diet: DIET RENAL    Code Status: Full

## 2020-01-30 NOTE — PROGRESS NOTES
disease), Hyperlipidemia, Hypertension, Hypotension, MI (myocardial infarction) ([address] Peripheral neuropathy, Peripheral vascular disease ([address] Pulmonary HTN ([address] Reflux, Sleep apnea, and Wears glasses. has a past surgical history that includes Tonsillectomy; Cholecystectomy; tumor excision; Upper gastrointestinal endoscopy (4/25/2013); Upper gastrointestinal endoscopy (12/10/2015); Upper gastrointestinal endoscopy (01/06/2017); Arterial bypass surgry (Left, 01/06/2016); Cardiac catheterization (03/27/2018); Coronary angioplasty with stent (03/16/2018); Rotator cuff repair (Left, 04/22/2016); Coronary angioplasty with stent (01/03/2018); Insertable Cardiac Monitor (02/14/2019); femoral bypass (Right, 5/16/2019); transluminal angioplasty (Bilateral, 10/08/2019); Cardiac catheterization (01/20/2020); and Coronary artery bypass graft (N/A, 1/27/2020). Restrictions  Restrictions/Precautions  Restrictions/Precautions: General Precautions, Fall Risk, Cardiac  Position Activity Restriction  Sternal Precautions: 5# Lifting Restrictions, No Pulling  Other position/activity restrictions: High fall risk per nursing assessment, ambulate pt, up in chair; telemetry with ICU monitoring, central line  Subjective   General  Chart Reviewed: Yes  Response To Previous Treatment: Patient with no complaints from previous session. Family / Caregiver Present: No  Referring Practitioner: Dr. Barbara Givens  Subjective  Subjective: Pt agreeable to work with PT this morning. States she's hoping to try to get up and walk without the walker. \"I got up by myself earlier today and took myself to the bathroom to get washed up. \"  General Comment  Comments: Pt sitting up in chair upon entry of therapy staff  Pain Screening  Patient Currently in Pain: Yes  Pain Assessment  Pain Assessment: 0-10  Pain Level: 8  Pain Type: Surgical pain  Pain Location: Chest;Sternum  Pain Orientation: Mid  Pain Descriptors: Aching; Sore  Pain Frequency: Continuous  Non-Pharmaceutical Pain Intervention(s): Ambulation/Increased Activity;Repositioned; Therapeutic presence    Orientation  Orientation  Overall Orientation Status: Within Normal Limits    Objective   Bed mobility  Supine to Sit: Unable to assess(pt up in chair before & after therapy)  Scooting: Supervision(to scoot forward<>backward in chair)     Transfers  Sit to Stand: Supervision  Stand to sit: Supervision  Bed to Chair: Unable to assess(pt up in chair before & after therapy)  Comment: No cues needed to maintain sternal precautions with sit<>stand. Ambulation  Surface: level tile  Device: No Device  Assistance: Stand by assistance  Quality of Gait: Pt amb with slow but steady tamir using partial step-through gait pattern. Appears fairly steady while ambulating without device -- despite slow pace; no LOB. Gait Deviations: Slow Tamir;Decreased step length;Decreased step height  Distance: x 125 feet  Comments: Amb distance limited by c/o fatigue/increased SOB.     Stairs/Curb  Stairs?: No(deferred stair amb today due to increased SOB/work of breathing when amb over level surfaces)     Balance  Posture: Good  Sitting - Static: Good  Sitting - Dynamic: Good  Standing - Static: Good;-  Standing - Dynamic: Good;-     Exercises  Gluteal Sets: x 15 bilat  Hip Flexion: seated marching x 10 BLE  Hip Abduction: seated clamshells x 15 BLE  Knee Long Arc Quad: x 15 BLE  Ankle Pumps: x 20 BLE     AM-PAC Score  AM-PAC Inpatient Mobility Raw Score : 20 (01/30/20 1207)  AM-PAC Inpatient T-Scale Score : 47.67 (01/30/20 1207)  Mobility Inpatient CMS 0-100% Score: 35.83 (01/30/20 1207)  Mobility Inpatient CMS G-Code Modifier : CJ (01/30/20 1207)    Goals  Short term goals  Time Frame for Short term goals: 1 week, 2/05/20 (unless otherwise specified)  Short term goal 1: Pt will transfer supine <-> sit with CGA x 1  Short term goal 2: Pt will transfer sit <-> stand and bed>chair using least AD with supervision - MET 1/30/20  Short term goal 3: Pt will ambulate x 200 feet using least AD with supervision  Short term goal 4: By 1/31/20: Pt will tolerate 12-15 reps BLE exercise for strengthening, balance, and endurance - MET 1/30/20  Short term goal 5: Pt will ambulate up/down 2 steps without handrails with SBA  Patient Goals   Patient goals : \"To be able to get out of bed and get to the bathroom by myself. \"    Plan    Times per week: 5-7x/week in acute care  Times per day: Daily  Specific instructions for Next Treatment: Progress ther ex and mobility as tolerated  Current Treatment Recommendations: Strengthening, Gait Training, Stair training, Balance Training, Functional Mobility Training, Endurance Training, Transfer Training, Safety Education & Training, Patient/Caregiver Education & Training, Equipment Evaluation, Education, & procurement, Positioning, Home Exercise Program, ROM  Safety Devices:  All fall risk precautions in place, Call light within reach, Gait belt, Nurse notified, Left in chair     Therapy Time   Individual Concurrent Group Co-treatment   Time In 0746         Time Out 0812         Minutes 26         Timed Code Treatment Minutes: [address] [facility], [place], [address]

## 2020-01-30 NOTE — PROGRESS NOTES
medications. 4- Anemia: Stable hemoglobin, monitor. 5- Chest pain: S/p CABG on 1/27 per CTS. 6- CHF: EF 35-50%, compensated for now.     Okay to remove vascath - d/w nursing

## 2020-01-30 NOTE — PROGRESS NOTES
CVTS Cardiothoracic Progress Note:                                CC:  Post op follow up     Surgery: 1/27/20 Urgent coronary artery bypass grafting surgery x1 with pedicled left internal mammary artery to the LAD. Cardiopulmonary bypass with on-pump beating-heart technique, no cross-clamp. Transesophageal echo. Epiaortic ultrasound. [place] catheter placement. Doppler verification of grafts. Bilateral five-level intercostal nerve block with Exparel. Platelet gel application. Sternal plating. Vas-Cath placement. Hospital course:   1/28 sitting up in bed, awake, alert and conversational. Reports discomfort in left ankle, feels \"like it needs to pop\" and requesting warm compress. 1/29 Up in chair, states she feels better today, despite being a bit more difficult to get out of bed.  1/30 bathed herself this morning, ambulating well, reports feeling good.      Past Medical History:   Diagnosis Date    Anxiety and depression     CAD (coronary artery disease) 01/2018    Cardiomyopathy ([address]     CHF (congestive heart failure) (HCC)     COPD (chronic obstructive pulmonary disease) (HCC)     Diabetes mellitus ([address]     GERD (gastroesophageal reflux disease)     Hyperlipidemia     Hypertension     Hypotension     MI (myocardial infarction) ([address]     Peripheral neuropathy     Peripheral vascular disease ([address]     Pulmonary HTN ([address]     Reflux     Sleep apnea     has never done sleep study;does not use CPAP    Wears glasses     for reading        Past Surgical History:   Procedure Laterality Date    ARTERIAL BYPASS SURGRY Left 01/06/2016    Axillary/Subclavian to Brachial Bypass w/reversed SVG    CARDIAC CATHETERIZATION  03/27/2018    Dr. Skyla Kennedy - at [address]  01/20/2020    Dr. Emily Godoy      pancreatitis post surgery    CORONARY ANGIOPLASTY WITH STENT PLACEMENT  03/16/2018    MELODY- 3.5 x 38 and 3.5 x 20 to Cx    CORONARY ANGIOPLASTY WITH STENT PLACEMENT  01/03/2018    MELODY- 3.0 x 38 and 2.75 x 26 and 2.75 x 8 and 2.75 x 22 to the LAD    CORONARY ARTERY BYPASS GRAFT N/A 1/27/2020    CORONARY ARTERY BYPASS GRAFTING X 1, ON PUMP BEATING HEART, INTERNAL MAMMARY ARTERY TO LEFT ANTERIOR DESCENDING ARTERY, VAS CATH INSERTION, URI, PLATELET GEL APPLICATION, 5 LEVEL BILATERAL INTERCOSTAL NERVE BLOCK, STERNAL PLATING performed by Liliana Almanzar MD at [address] Right 5/16/2019    fem-pop, performed by Ana Marin MD at [address]  02/14/2019    CardioMEMs insertion for CHF    ROTATOR CUFF REPAIR Left 04/22/2016    TONSILLECTOMY      TRANSLUMINAL ANGIOPLASTY Bilateral 10/08/2019    BLE w/PTA occluded L SFA and restenting L Prox SFA    TUMOR EXCISION      benign tumors X 2 chest & axilla    UPPER GASTROINTESTINAL ENDOSCOPY  4/25/2013    BX barretts, HH, gastritis    UPPER GASTROINTESTINAL ENDOSCOPY  12/10/2015    UPPER GASTROINTESTINAL ENDOSCOPY  01/06/2017    Gastritis        Allergies as of 01/17/2020 - Review Complete 01/17/2020   Allergen Reaction Noted    Lisinopril Other (See Comments) 09/05/2018        Patient Active Problem List   Diagnosis    Severe claudication ([address]    Diabetes mellitus type 2 in obese ([address]    HTN (hypertension)    CLINT (obstructive sleep apnea)    Tobacco abuse disorder    PVD (peripheral vascular disease) with claudication (HCC)    Hypotension    Volume depletion    GERD (gastroesophageal reflux disease)    Mixed hyperlipidemia    Anxiety and depression    Presyncope    Hyponatremia    Hypochloremia    Normocytic anemia    Hypomagnesemia    Acute hyperglycemia    CAD (coronary artery disease)    PVD (peripheral vascular disease) (HCC)    Acute systolic congestive heart failure (HCC)    Mitral regurgitation    Myocardiopathy ([address]    Palpitations    Chronic systolic congestive heart failure (HCC)    CKD (chronic kidney disease) stage 3, GFR 30-59 ml/min (HCC)    Hypokalemia    Acute renal failure with tubular necrosis (HCC)    Claudication in peripheral vascular disease (HCC)    Hypotension    COPD (chronic obstructive pulmonary disease) (HCC)    Cellulitis    [facility]NA [facility]) [facility]    Acute renal insufficiency    Hypervolemia    Vitamin D deficiency    Acute kidney injury ([facility]    Cellulitis of both feet    Acute on chronic systolic congestive heart failure (HCC)    Cellulitis of foot, left    Peripheral edema    Diabetic ulcer of toe of right foot associated with type 2 diabetes mellitus ([address]    Diabetic ulcer of left foot associated with type 2 diabetes mellitus ([address]    Hypoglycemia associated with type 2 diabetes mellitus ([facility]    Unstable angina pectoris ([facility])    Cardiomyopathy, ischemic    Acute anxiety    Diabetes mellitus ([facility]    Dyslipidemia        Vital Signs: /67   Pulse 82   Temp 97.7 °F (36.5 °C) (Oral)   Resp 16   Ht 5' 4.5\" (1.638 m)   Wt 237 lb 10.5 oz (107.8 kg)   LMP 10/24/2012   SpO2 99%   BMI 40.16 kg/m²  O2 Flow Rate (L/min): 0 L/min     Admission Weight: Weight: 245 lb (111.1 kg)    Weight on 1/27 (113 kg) Pre-op   Weight on 1/28 (113.3 kg)  1/29  110.8 kg  1/30  107.8 kg    Intake/Output:     Intake/Output Summary (Last 24 hours) at 1/30/2020 0906  Last data filed at 1/30/2020 0655  Gross per 24 hour   Intake 1977 ml   Output 1018 ml   Net 959 ml      Extubation Time: 1/27 @ 14:28  Transition Time: 1/28 @ 14:02    LABORATORY DATA:     CBC:   Recent Labs     01/28/20  0510 01/29/20  0527 01/30/20  0525   WBC 17.7* 14.6* 13.5*   HGB 8.9* 9.0* 8.5*   HCT 27.2* 27.7* 26.4*   MCV 88.1 88.5 88.6    276 264     BMP:   Recent Labs     01/28/20  0510 01/29/20  0527 01/30/20  0525    133* 130*   K 3.9 4.2 4.3    94* 92*   CO2 23 26 27   BUN 39* 32* 38*   CREATININE 1.5* 1.2* 1.4*     MG:    Recent Labs     01/28/20  0510 01/29/20  0527 01/30/20  0525   MG 1.80 1.70*

## 2020-01-31 VITALS
WEIGHT: 244.8 LBS | DIASTOLIC BLOOD PRESSURE: 61 MMHG | HEIGHT: 65 IN | RESPIRATION RATE: 24 BRPM | OXYGEN SATURATION: 97 % | HEART RATE: 73 BPM | TEMPERATURE: 98.2 F | SYSTOLIC BLOOD PRESSURE: 102 MMHG | BODY MASS INDEX: 40.79 KG/M2

## 2020-01-31 PROBLEM — E87.6 HYPOKALEMIA: Status: RESOLVED | Noted: 2019-04-08 | Resolved: 2020-01-31

## 2020-01-31 PROBLEM — R60.9 PERIPHERAL EDEMA: Status: RESOLVED | Noted: 2020-01-17 | Resolved: 2020-01-31

## 2020-01-31 PROBLEM — F41.9 ACUTE ANXIETY: Status: RESOLVED | Noted: 2020-01-26 | Resolved: 2020-01-31

## 2020-01-31 PROBLEM — I50.23 ACUTE ON CHRONIC SYSTOLIC CONGESTIVE HEART FAILURE (HCC): Status: RESOLVED | Noted: 2020-01-10 | Resolved: 2020-01-31

## 2020-01-31 PROBLEM — E11.649 HYPOGLYCEMIA ASSOCIATED WITH TYPE 2 DIABETES MELLITUS (HCC): Status: RESOLVED | Noted: 2020-01-18 | Resolved: 2020-01-31

## 2020-01-31 PROBLEM — E87.1 HYPONATREMIA: Status: RESOLVED | Noted: 2018-05-23 | Resolved: 2020-01-31

## 2020-01-31 LAB
ANION GAP SERPL CALCULATED.3IONS-SCNC: 15 MMOL/L (ref 3–16)
BUN BLDV-MCNC: 41 MG/DL (ref 7–20)
CALCIUM SERPL-MCNC: 9.1 MG/DL (ref 8.3–10.6)
CHLORIDE BLD-SCNC: 89 MMOL/L (ref 99–110)
CO2: 25 MMOL/L (ref 21–32)
CREAT SERPL-MCNC: 1.4 MG/DL (ref 0.6–1.1)
GFR AFRICAN AMERICAN: 47
GFR NON-AFRICAN AMERICAN: 39
GLUCOSE BLD-MCNC: 144 MG/DL (ref 70–99)
GLUCOSE BLD-MCNC: 175 MG/DL (ref 70–99)
HCT VFR BLD CALC: 27.4 % (ref 36–48)
HEMOGLOBIN: 8.7 G/DL (ref 12–16)
MAGNESIUM: 2.1 MG/DL (ref 1.8–2.4)
MCH RBC QN AUTO: 27.8 PG (ref 26–34)
MCHC RBC AUTO-ENTMCNC: 31.6 G/DL (ref 31–36)
MCV RBC AUTO: 88.1 FL (ref 80–100)
PDW BLD-RTO: 16.5 % (ref 12.4–15.4)
PERFORMED ON: ABNORMAL
PLATELET # BLD: 333 K/UL (ref 135–450)
PMV BLD AUTO: 8.2 FL (ref 5–10.5)
POTASSIUM SERPL-SCNC: 3.9 MMOL/L (ref 3.5–5.1)
RBC # BLD: 3.11 M/UL (ref 4–5.2)
SODIUM BLD-SCNC: 129 MMOL/L (ref 136–145)
WBC # BLD: 14.6 K/UL (ref 4–11)

## 2020-01-31 PROCEDURE — 36415 COLL VENOUS BLD VENIPUNCTURE: CPT

## 2020-01-31 PROCEDURE — 6370000000 HC RX 637 (ALT 250 FOR IP): Performed by: THORACIC SURGERY (CARDIOTHORACIC VASCULAR SURGERY)

## 2020-01-31 PROCEDURE — 97535 SELF CARE MNGMENT TRAINING: CPT

## 2020-01-31 PROCEDURE — 6370000000 HC RX 637 (ALT 250 FOR IP): Performed by: NURSE PRACTITIONER

## 2020-01-31 PROCEDURE — 97110 THERAPEUTIC EXERCISES: CPT

## 2020-01-31 PROCEDURE — 83735 ASSAY OF MAGNESIUM: CPT

## 2020-01-31 PROCEDURE — 80048 BASIC METABOLIC PNL TOTAL CA: CPT

## 2020-01-31 PROCEDURE — 2580000003 HC RX 258: Performed by: THORACIC SURGERY (CARDIOTHORACIC VASCULAR SURGERY)

## 2020-01-31 PROCEDURE — 97116 GAIT TRAINING THERAPY: CPT

## 2020-01-31 PROCEDURE — 85027 COMPLETE CBC AUTOMATED: CPT

## 2020-01-31 PROCEDURE — 6360000002 HC RX W HCPCS: Performed by: THORACIC SURGERY (CARDIOTHORACIC VASCULAR SURGERY)

## 2020-01-31 PROCEDURE — 99024 POSTOP FOLLOW-UP VISIT: CPT | Performed by: THORACIC SURGERY (CARDIOTHORACIC VASCULAR SURGERY)

## 2020-01-31 RX ORDER — TORSEMIDE 100 MG/1
50 TABLET ORAL 2 TIMES DAILY
Status: DISCONTINUED | OUTPATIENT
Start: 2020-01-31 | End: 2020-01-31 | Stop reason: HOSPADM

## 2020-01-31 RX ORDER — SPIRONOLACTONE 100 MG/1
50 TABLET, FILM COATED ORAL 2 TIMES DAILY
Qty: 30 TABLET | Refills: 0 | Status: ON HOLD | OUTPATIENT
Start: 2020-01-31 | End: 2020-03-13 | Stop reason: HOSPADM

## 2020-01-31 RX ORDER — NITROGLYCERIN 0.4 MG/1
0.4 TABLET SUBLINGUAL EVERY 5 MIN PRN
Status: ON HOLD | COMMUNITY
End: 2020-01-31 | Stop reason: HOSPADM

## 2020-01-31 RX ORDER — TORSEMIDE 100 MG/1
50 TABLET ORAL 2 TIMES DAILY
Qty: 30 TABLET | Refills: 0 | Status: SHIPPED | OUTPATIENT
Start: 2020-01-31 | End: 2020-03-04

## 2020-01-31 RX ORDER — ATORVASTATIN CALCIUM 80 MG/1
80 TABLET, FILM COATED ORAL NIGHTLY
Qty: 30 TABLET | Refills: 0 | Status: SHIPPED | OUTPATIENT
Start: 2020-01-31 | End: 2020-02-28 | Stop reason: SDUPTHER

## 2020-01-31 RX ORDER — POTASSIUM CHLORIDE 20 MEQ/1
40 TABLET, EXTENDED RELEASE ORAL 4 TIMES DAILY
COMMUNITY
End: 2020-04-15 | Stop reason: SDUPTHER

## 2020-01-31 RX ORDER — SPIRONOLACTONE 25 MG/1
50 TABLET ORAL 2 TIMES DAILY
Status: DISCONTINUED | OUTPATIENT
Start: 2020-01-31 | End: 2020-01-31 | Stop reason: HOSPADM

## 2020-01-31 RX ORDER — ALBUTEROL SULFATE 90 UG/1
2 AEROSOL, METERED RESPIRATORY (INHALATION) EVERY 6 HOURS PRN
Status: ON HOLD | COMMUNITY
End: 2020-07-03 | Stop reason: SDUPTHER

## 2020-01-31 RX ORDER — PSEUDOEPHEDRINE HCL 30 MG
100 TABLET ORAL 2 TIMES DAILY
Qty: 14 CAPSULE | Refills: 0 | COMMUNITY
Start: 2020-01-31 | End: 2020-02-07

## 2020-01-31 RX ORDER — BUPRENORPHINE AND NALOXONE 8; 2 MG/1; MG/1
1 FILM, SOLUBLE BUCCAL; SUBLINGUAL 2 TIMES DAILY
COMMUNITY
End: 2021-01-01

## 2020-01-31 RX ADMIN — METOPROLOL TARTRATE 25 MG: 25 TABLET ORAL at 08:13

## 2020-01-31 RX ADMIN — MUPIROCIN: 20 OINTMENT TOPICAL at 08:22

## 2020-01-31 RX ADMIN — OXYCODONE 10 MG: 5 TABLET ORAL at 05:39

## 2020-01-31 RX ADMIN — FAMOTIDINE 20 MG: 20 TABLET, FILM COATED ORAL at 08:13

## 2020-01-31 RX ADMIN — CLOPIDOGREL 75 MG: 75 TABLET, FILM COATED ORAL at 08:14

## 2020-01-31 RX ADMIN — ASPIRIN 81 MG: 81 TABLET, COATED ORAL at 08:14

## 2020-01-31 RX ADMIN — HEPARIN SODIUM 5000 UNITS: 5000 INJECTION INTRAVENOUS; SUBCUTANEOUS at 05:28

## 2020-01-31 RX ADMIN — Medication 250 MG: at 08:13

## 2020-01-31 RX ADMIN — INSULIN LISPRO 1 UNITS: 100 INJECTION, SOLUTION INTRAVENOUS; SUBCUTANEOUS at 08:23

## 2020-01-31 RX ADMIN — SPIRONOLACTONE 50 MG: 25 TABLET ORAL at 11:35

## 2020-01-31 RX ADMIN — BENZTROPINE MESYLATE 1 MG: 1 TABLET ORAL at 08:13

## 2020-01-31 RX ADMIN — BUSPIRONE HYDROCHLORIDE 30 MG: 5 TABLET ORAL at 11:35

## 2020-01-31 RX ADMIN — TORSEMIDE 50 MG: 100 TABLET ORAL at 11:35

## 2020-01-31 RX ADMIN — LAMOTRIGINE 150 MG: 100 TABLET ORAL at 08:14

## 2020-01-31 RX ADMIN — Medication 15 ML: at 08:22

## 2020-01-31 RX ADMIN — Medication 10 ML: at 08:22

## 2020-01-31 ASSESSMENT — PAIN SCALES - WONG BAKER
WONGBAKER_NUMERICALRESPONSE: 0

## 2020-01-31 ASSESSMENT — PAIN SCALES - GENERAL
PAINLEVEL_OUTOF10: 8
PAINLEVEL_OUTOF10: 0

## 2020-01-31 NOTE — DISCHARGE SUMMARY
Cardiac, Vascular & Thoracic Surgery  Discharge Summary    Patient:  Veronica Harden 1965 [id number]   Admission Date:  1/17/2020  1:07 PM  Discharge Date:  1/31/20     Principle Diagnosis:  CAD (coronary artery disease)    Secondary Diagnosis:  Principal Problem:    CAD (coronary artery disease)  Active Problems:    Tobacco abuse disorder    CKD (chronic kidney disease) stage 3, GFR 30-59 ml/min ([facility])    Diabetic ulcer of toe of right foot associated with type 2 diabetes mellitus ([facility]    Diabetic ulcer of left foot associated with type 2 diabetes mellitus ([facility])    Diabetes mellitus ([facility]    Dyslipidemia  Resolved Problems:    Hyponatremia    Hypokalemia    Acute on chronic systolic congestive heart failure (HCC)    Chest pain    Peripheral edema    Hypoglycemia associated with type 2 diabetes mellitus ([facility])    Unstable angina pectoris ([facility])    Cardiomyopathy, ischemic    Acute anxiety    LHC: 1/20/20 with Dr. Barber [place]: Right     LM: 70-80% short distal stenosis     LAD: 70-80% short ostial stenosis, extending into takeoff of high first diagonal; large proximal to mid stented segment patent with jailed second diagonal  (80% ostial D2) and 70% in stent restenosis of most distal stent; distal to near apical LAD with minimal disease     LCx: 70-80% short ostial stenosis, prior to patent proximal to mid stents      RCA: large, dominant vessel with long 60% proximal to mid stenosis      LVEDP: 4 mmHG  LVG not done due to CKD.        Procedure: 1/27/20 Urgent coronary artery bypass grafting surgery x1 with pedicled left internal mammary artery to the LAD. Cardiopulmonary bypass with on-pump beating-heart technique, no cross-clamp. Transesophageal echo. Epiaortic ultrasound. [place]-Jurgen catheter placement. Doppler verification of grafts. Bilateral five-level intercostal nerve block with Exparel. Platelet gel application. Sternal plating. Vas-Cath placement.      History: The patient is a 62 y.o. female with significant past medical history of extensive PVD, CAD, HTN, HLD, CKD stage III, COPD, and MI X3 (with 6 stents), GERD and T2DM who presented to [facility] ED on 1/18 with c/o chest pain and pressure w/ SOB, N/V. Cardiology was consulted and she was admitted for further work up. She underwent a cardiac catheterization that revealed multivessel CAD. We have been consulted for surgical revascularization. Hospital Course: The post operative period for this patient was uneventful. She was discharged on 1/31/20 home with [facility] and will follow up in office with Dr. Audie Guerra on 2/11/20. Discharged Condition: stable    Disposition:  Home with [facility]    Medications:  Aspirin:start  ADP Inhibitor (plavix/brillinta/effient):start  ACE/ARB:contraindicated 2/2 relative hypotension  Betablocker:increase  Statin:increase    Discharge Medications:   [place], [address] [place] [address] Medication Instructions ZVE:[id number]    Printed on:01/31/20 0925   Medication Information                      albuterol sulfate  (90 Base) MCG/ACT inhaler  Inhale 2 puffs into the lungs every 6 hours as needed for Wheezing or Shortness of Breath             ARIPiprazole (ABILIFY) 10 MG tablet  Take 10 mg by mouth daily              aspirin EC 81 MG EC tablet  Take 1 tablet by mouth daily             atorvastatin (LIPITOR) 80 MG tablet  Take 1 tablet by mouth nightly             benztropine (COGENTIN) 1 MG tablet  Take 1 mg by mouth nightly              blood glucose test strips (EXACTECH TEST) strip  6 times daily. buprenorphine-naloxone (SUBOXONE) 8-2 MG FILM SL film  Place 1 Film under the tongue 2 times daily.              busPIRone (BUSPAR) 30 MG tablet  Take 30 mg by mouth 2 times daily              clopidogrel (PLAVIX) 75 MG tablet  TAKE 1 TABLET BY MOUTH EVERY DAY             docusate sodium (COLACE, DULCOLAX) 100 MG CAPS  Take 100 mg by mouth 2 times daily for 7 days             Insulin Degludec (TRESIBA FLEXTOUCH) 100 UNIT/ML SOPN  Inject 100 Units into the skin nightly             insulin lispro (HUMALOG KWIKPEN) 100 UNIT/ML pen  Inject 15 Units into the skin 3 times daily (before meals)             Insulin Pen Needle (B-D ULTRAFINE III SHORT PEN) 31G X 8 MM MISC  Five shots a day             INSULIN SYRINGE .5CC/29G 29G X 1/2\" 0.5 ML MISC  1 each by Does not apply route daily             lamoTRIgine (LAMICTAL) 150 MG tablet  Take 150 mg by mouth 2 times daily              Liraglutide (VICTOZA) 18 MG/3ML SOPN SC injection  Inject 1.2 mg into the skin daily             magnesium oxide (MAG-OX) 400 (240 Mg) MG tablet  TAKE 1 TABLET ONCE DAILY             metoprolol tartrate (LOPRESSOR) 25 MG tablet  Take 1 tablet by mouth 2 times daily             pantoprazole (PROTONIX) 40 MG tablet  Take 1 tablet by mouth 2 times daily             potassium chloride (KLOR-CON M) 20 MEQ extended release tablet  Take 40 mEq by mouth 4 times daily             SPIRIVA RESPIMAT 2.5 MCG/ACT AERS inhaler  INHALE 2 PUFFS INTO THE LUNGS DAILY             spironolactone (ALDACTONE) 100 MG tablet  Take 0.5 tablets by mouth 2 times daily             torsemide (DEMADEX) 100 MG tablet  Take 0.5 tablets by mouth 2 times daily             vitamin D (ERGOCALCIFEROL) 1.25 MG (08294 UT) CAPS capsule  Take 1 capsule by mouth once a week               Patient Instructions: Activity: DO NOT LIFT, PUSH, OR PULL ANYTHING OVER 5 POUNDS FOR 6 WEEK from the day of surgery  Diet:  cardiac diet and diabetic diet  Wound Care:  [facility] YOUR INCISIONS DAILY WITH A CLEAN WASHCLOTH AND ANTIBACTERIAL SOAP. Do not wash your incisions after you have cleansed other parts of your body    Follow up with Cardiothoracic Surgeon, Dr. Veneda Apley on 2/11/20 at 09:30 am.    Follow up with Cardiology NP, Quinton Trevizo, on 3/04/20 at 14:15. TRAY Hurtado-CNP   Agree with note.       Ming Salvador MD, FACS, [facility], FACLUIS FELIPE, [place]

## 2020-01-31 NOTE — CARE COORDINATION
[facility] unable to staff; referral faxed to [facility]  [address] [facility], BSN CTN  [address] [facility] [phone]

## 2020-01-31 NOTE — PROGRESS NOTES
CVTS Cardiothoracic Progress Note:                                CC:  Post op follow up     Surgery: 1/27/20 Urgent coronary artery bypass grafting surgery x1 with pedicled left internal mammary artery to the LAD. Cardiopulmonary bypass with on-pump beating-heart technique, no cross-clamp. Transesophageal echo. Epiaortic ultrasound. [place]-Jurgen catheter placement. Doppler verification of grafts. Bilateral five-level intercostal nerve block with Exparel. Platelet gel application. Sternal plating. Vas-Cath placement. Hospital course:   1/28 sitting up in bed, awake, alert and conversational. Reports discomfort in left ankle, feels \"like it needs to pop\" and requesting warm compress. 1/29 Up in chair, states she feels better today, despite being a bit more difficult to get out of bed.  1/30 bathed herself this morning, ambulating well, reports feeling good. 1/31 sitting up in chair, already bathed herself again, ready to go home.     Past Medical History:   Diagnosis Date    Anxiety and depression     CAD (coronary artery disease) 01/2018    Cardiomyopathy ([address]     CHF (congestive heart failure) (HCC)     COPD (chronic obstructive pulmonary disease) (HCC)     Diabetes mellitus ([address]     GERD (gastroesophageal reflux disease)     Hyperlipidemia     Hypertension     Hypotension     MI (myocardial infarction) ([address]     Peripheral neuropathy     Peripheral vascular disease ([address]     Pulmonary HTN ([address]     Reflux     Sleep apnea     has never done sleep study;does not use CPAP    Wears glasses     for reading        Past Surgical History:   Procedure Laterality Date    ARTERIAL BYPASS SURGRY Left 01/06/2016    Axillary/Subclavian to Brachial Bypass w/reversed SVG    CARDIAC CATHETERIZATION  03/27/2018    Dr. Jacques Carlos - at [address]  01/20/2020    Dr. Byron Tinoco      pancreatitis post surgery    CORONARY ANGIOPLASTY WITH STENT CXR: 1/27 bilateral pulm congestion  ___________________________________________________________________    Subjective:   Dietary Intake: good  no Nausea   Pain Control: better controlled today, prn meds  Complaints: mild post op pain  Bowels: have moved multiple times    Objective:   General appearance: awake, alert and in NAD  Lungs: CTAB  Heart: S1S2 RRR; SR on monitor, HR in the 80's  Chest: symmetrical expansion with inspiration and expirations; no rocking of sternum noted   Abdomen:soft, non-tender  Bowel sounds: normoactive  Kidneys: UOP satisfactory ml in 24 hrs; Cr 1.4 (1.5-1.7 baseline)  Wound/Incisions: Midsternal incision CDI; Pacing wires out  Extremities: BLE pulses palpable; minimal tibial swelling noted. Neurological: intact, non focal exam, speech strong   Chest tubes/Drains: all out  ________________________________________________________________________    SCIP Measures:     · Lees catheter for:  ? IV Diuresis  ? Accurate I/O  ? D/C today  ·   · Antibiotics:  ? Have been stopped  ? Prophylaxis (POD #1 only)  ? Continued for:   ________________________________________________________________________    Assessment:   Post-op: 4 days. Condition: In stable condition. Plan:   1. Cardiovascular: s/p CABG- BB, ASA, Statin, Plavix. Remains SR, HR in 80's. Restarted Plavix 1/29. 2. Pulmonary: needs pulmonary expansion maneuvers- IS, PT/OT, oobtc    3. Neurology: analgesia as needed    4. Nephrology: fluid management; diurese as tolerated. Restarted her home diuretics at 1/2 dose per Nephro's recommendations. Will let metolazone PRN. 5. Endocrinology: Pt discharging today, will resume home diabetic medications. 6. Hematology: acute blood loss anemia; expected, stable, monitor. 7. Microbiology: nothing at this time    8. Nutrition: cardiac diet    9. Labs: monitor    10. Post-op Drains/Wires: all out     12. D/C Goals: DCP following, home today with C.      12. Continue post-op

## 2020-01-31 NOTE — DISCHARGE INSTR - COC
Continuity of Care Form    Patient Name: Devin Roberson   :  1965  MRN:  [id number]    Admit date:  2020  Discharge date:  20    Code Status Order: Full Code   Advance Directives:   Advance Care Flowsheet Documentation     Date/Time Healthcare Directive Type of Healthcare Directive Copy in [address] Agent's Name Healthcare Agent's Phone Number    20 3144  Yes, patient has an advance directive for healthcare treatment  Durable power of  for health care;Living will  Yes, copy in chart  Adult Cathy Dhaliwal  [phone]    20 1815  No, patient does not have an advance directive for healthcare treatment  --  --  --  --  --          Admitting Physician:  Brien Hunt DO  PCP: Rocky Soto PA-C    Discharging Nurse: [facility] Unit/Room#: 5311/2524-52  Discharging Unit Phone Number: [phone]    Emergency Contact:   Extended Emergency Contact Information  Primary Emergency Contact: noir madera  Mobile Phone: [phone]  Relation: Child  Preferred language: English   needed?  No  Secondary Emergency Contact: Bernadette Arango  [place] Phone: [phone]  Relation: Other    Past Surgical History:  Past Surgical History:   Procedure Laterality Date    ARTERIAL BYPASS SURGRY Left 2016    Axillary/Subclavian to Brachial Bypass w/reversed SVG    CARDIAC CATHETERIZATION  2018    Dr. Escobedo Mater - at [address] [place] [place]  2020    Dr. Leon Master      pancreatitis post surgery    CORONARY ANGIOPLASTY WITH STENT PLACEMENT  2018    MELODY- 3.5 x 38 and 3.5 x 20 to Cx    CORONARY ANGIOPLASTY WITH STENT PLACEMENT  2018    MELODY- 3.0 x 38 and 2.75 x 26 and 2.75 x 8 and 2.75 x 22 to the LAD    CORONARY ARTERY BYPASS GRAFT N/A 2020    CORONARY ARTERY BYPASS GRAFTING X 1, ON PUMP BEATING HEART, INTERNAL MAMMARY ARTERY TO LEFT ANTERIOR DESCENDING ARTERY, VAS CATH INSERTION, URI, PLATELET GEL APPLICATION, 5 LEVEL BILATERAL INTERCOSTAL NERVE BLOCK, STERNAL PLATING performed by Lazarus Rosier, MD at [address] Right 5/16/2019    fem-pop, performed by Wanda Rodriguez MD at [address]  02/14/2019    CardioMEMs insertion for CHF    ROTATOR CUFF REPAIR Left 04/22/2016    TONSILLECTOMY      TRANSLUMINAL ANGIOPLASTY Bilateral 10/08/2019    BLE w/PTA occluded L SFA and restenting L Prox SFA    TUMOR EXCISION      benign tumors X 2 chest & axilla    UPPER GASTROINTESTINAL ENDOSCOPY  4/25/2013    BX barretts, HH, gastritis    UPPER GASTROINTESTINAL ENDOSCOPY  12/10/2015    UPPER GASTROINTESTINAL ENDOSCOPY  01/06/2017    Gastritis       Immunization History:   Immunization History   Administered Date(s) Administered    Influenza Vaccine, unspecified formulation 11/04/2016    Influenza Virus Vaccine 12/11/2015, 11/21/2017    Influenza, Coretha Delay, IM, (6 mo and older Fluzone, Flulaval, Fluarix and 3 yrs and older Afluria) 12/19/2013, 10/14/2016, 11/09/2017    Influenza, Coretha Delay, IM, PF (6 mo and older Fluzone, Flulaval, Fluarix, and 3 yrs and older Afluria) 10/01/2019    Pneumococcal Polysaccharide ([id number]) 12/11/2015       Active Problems:  Patient Active Problem List   Diagnosis Code    Severe claudication ([facility]) I73.9    Diabetes mellitus type 2 in obese ([facility]) E11.69, E66.9    HTN (hypertension) I10    CLINT (obstructive sleep apnea) G47.33    Tobacco abuse disorder Z72.0    PVD (peripheral vascular disease) with claudication ([facility]) I73.9    Hypotension I95.9    Volume depletion E86.9    GERD (gastroesophageal reflux disease) K21.9    Mixed hyperlipidemia E78.2    Anxiety and depression F41.9, F32.9    Presyncope R42, R55    Hyponatremia E87.1    Hypochloremia E87.8    Normocytic anemia D64.9    Hypomagnesemia E83.42    Acute hyperglycemia R73.9    CAD concentrate and follow conversation    IV Access:  - None    Nursing Mobility/ADLs:  Walking   Assisted  Transfer  Assisted  Bathing  Independent  Dressing  Assisted  Toileting  Assisted  Feeding  Independent  Med Admin  Assisted  Med Delivery   whole    Wound Care Documentation and Therapy:  Wound 01/17/20 Toe (Comment  which one) Anterior; Left Closed brown plantar 3rd toe (Active)   Wound Image   1/20/2020  9:53 AM   Wound Diabetic 1/30/2020  8:00 PM   Offloading for Diabetic Foot Ulcers Diabetic shoes/inserts 1/25/2020  8:15 AM   Dressing Changed Other (Comment) 1/30/2020  3:45 AM   Dressing/Treatment Open to air 1/31/2020  3:36 AM   Wound Cleansed Not Cleansed 1/28/2020 12:00 AM   Wound Length (cm) 0.4 cm 1/20/2020  9:53 AM   Wound Width (cm) 0.4 cm 1/20/2020  9:53 AM   Wound Depth (cm) 0.2 cm 1/20/2020  9:53 AM   Wound Surface Area (cm^2) 0.16 cm^2 1/20/2020  9:53 AM   Wound Volume (cm^3) 0.03 cm^3 1/20/2020  9:53 AM   Distance Tunneling (cm) 0 cm 1/20/2020  9:53 AM   Tunneling Position ___ O'Clock 0 1/20/2020  9:53 AM   Undermining Starts ___ O'Clock 0 1/20/2020  9:53 AM   Undermining Ends___ O'Clock 0 1/20/2020  9:53 AM   Undermining Maxium Distance (cm) 0 1/20/2020  9:53 AM   Wound Assessment Clean;Dry; Intact 1/30/2020  3:45 AM   Drainage Amount None 1/30/2020  3:45 AM   Odor None 1/30/2020  3:45 AM   Margins Attached edges; Defined edges 1/27/2020  4:00 PM   Shaye-wound Assessment Clean;Dry; Intact 1/30/2020  3:45 AM   Other%Wound Bed brown 100% 1/20/2020  9:53 AM   Culture Taken No 1/20/2020  9:53 AM   Number of days: 13       Wound 01/20/20 Toe (Comment  which one) Anterior; Left 1st dorsal toe lateral edge of nail, brown & dry (Active)   Wound Image   1/20/2020  9:53 AM   Wound Diabetic 1/30/2020  8:00 PM   Offloading for Diabetic Foot Ulcers Diabetic shoes/inserts 1/20/2020  9:53 AM   Dressing Status Other (Comment) 1/30/2020  3:45 AM   Dressing Changed Other (Comment) 1/30/2020  3:45 AM [P.O.:840]  Out: 1000 [Urine:1000]    Safety Concerns: At Risk for Falls    Impairments/Disabilities:      None    Nutrition Therapy:  Current Nutrition Therapy:   - Oral Diet:  Cardiac    Routes of Feeding: Oral  Liquids: Thin Liquids  Daily Fluid Restriction: yes - amount 1500  Last Modified Barium Swallow with Video (Video Swallowing Test): not done    Treatments at the Time of Hospital Discharge:   Respiratory Treatments: See EMAR  Oxygen Therapy:  is not on home oxygen therapy. Ventilator:    - No ventilator support    Rehab Therapies: Physical Therapy, Occupational Therapy and Nurse  Weight Bearing Status/Restrictions: Sternal precautions  Other Medical Equipment (for information only, NOT a DME order):  walker  Other Treatments: NA    Patient's personal belongings (please select all that are sent with patient):  None    RN SIGNATURE:  Electronically signed by Hermelindo Charlton RN on 1/31/20 at 11:13 AM    CASE MANAGEMENT/SOCIAL WORK SECTION    Inpatient Status Date: 1/18/20    Readmission Risk Assessment Score:  Readmission Risk              Risk of Unplanned Readmission:        54           Discharging to Facility/ Agency   · Name: [facility]  · Address:  · Phone: [phone]  · Fax: [phone]    Dialysis Facility (if applicable)   · Name:  · Address:  · Dialysis Schedule:  · Phone:  · Fax:    / signature: Electronically signed by Ene Johnson RN on 1/31/20 at 10:49 AM    PHYSICIAN SECTION    Prognosis: Good    Condition at Discharge: Stable    Rehab Potential (if transferring to Rehab): Good    Recommended Labs or Other Treatments After Discharge: ***    Physician Certification: I certify the above information and transfer of [place]  is necessary for the continuing treatment of the diagnosis listed and that she requires [address] for less 30 days.      Update Admission H&P: No change in H&P    PHYSICIAN SIGNATURE:  Electronically signed by Anne Marie Pink MD

## 2020-01-31 NOTE — DISCHARGE SUMMARY
DO   1/31/2020      Thank you Hussein Merrill PA-C for the opportunity to be involved in this patient's care. If you have any questions or concerns please feel free to contact me at [phone].

## 2020-01-31 NOTE — PROGRESS NOTES
Discharge instructions reviewed with patient and family member. Patient and family verbalized understanding. All home medications have been reviewed, questions answered and patient voiced understanding. Given prescriptions, discharge instructions, and appointment times.

## 2020-01-31 NOTE — FLOWSHEET NOTE
Followed up with Pt and  per referral from other . Both in good spirits and doing well. Pt glad to be going home today and that surgery went well.  acknowledged and affirmed their emotions. No other needs at this time. [facility]       01/31/20 1027   Encounter Summary   Services provided to: Patient and family together   Referral/Consult From: Other ;Rounding   Support System Family members   Continue Visiting   (1/31 follow up, listened and sppt)   Complexity of Encounter Moderate   Length of Encounter 15 minutes   Routine   Type Follow up   Assessment Calm; Approachable; Hopeful   Intervention Active listening;Explored feelings, thoughts, concerns; Discussed illness/injury and it's impact   Outcome Expressed gratitude;Engaged in conversation;Expressed feelings/needs/concerns

## 2020-01-31 NOTE — PLAN OF CARE
Problem: Falls - Risk of:  Goal: Absence of physical injury  Description  Absence of physical injury  Outcome: Ongoing     Problem: Infection:  Goal: Will remain free from infection  Description  Will remain free from infection  Outcome: Ongoing     Problem: Safety:  Goal: Free from intentional harm  Description  Free from intentional harm  Outcome: Ongoing     Problem: Daily Care:  Goal: Daily care needs are met  Description  Daily care needs are met  Outcome: Ongoing     Problem: Pain:  Goal: Control of acute pain  Description  Control of acute pain  Outcome: Ongoing     Problem: Pain:  Goal: Pain level will decrease  Description  Pain level will decrease  Outcome: Ongoing     Problem: Discharge Planning:  Goal: Patients continuum of care needs are met  Description  Patients continuum of care needs are met  Outcome: Ongoing     Problem: Nutrition  Goal: Optimal nutrition therapy  Outcome: Ongoing

## 2020-01-31 NOTE — CARE COORDINATION
CASE MANAGEMENT DISCHARGE SUMMARY      Discharge to: home with [address] [facility] Equipment ordered/agency: none    Transportation: private   Family/car:at bedside to transport home. Notified: Pt aware of discharge plans and in agreement. Family: at bedside and aware of discharge plan.s    Facility/Agency: [facility] faxed to [facility]   RN: Chriss Preciado RN aware of discharge plans.        Gilson Villarreal RN

## 2020-01-31 NOTE — PROGRESS NOTES
Hyponatremia: Improving with diuresis into low 130 range. 3- Hypotension: Holding all antihypertensive medications. 4- Anemia: Stable hemoglobin, monitor. 5- Chest pain: S/p CABG on 1/27 per CTS. 6- CHF: EF 35-50%, compensated for now.     Okay for discharge  Recheck labs in one week, follow-up with us in office arranged

## 2020-01-31 NOTE — PROGRESS NOTES
Recommendations: Strengthening, Gait Training, Stair training, Balance Training, Functional Mobility Training, Endurance Training, Transfer Training, Safety Education & Training, Patient/Caregiver Education & Training, Equipment Evaluation, Education, & procurement, Positioning, Home Exercise Program, ROM  Safety Devices  Type of devices: All fall risk precautions in place, Call light within reach, Gait belt, Nurse notified, Left in chair     Therapy Time   Individual Concurrent Group Co-treatment   Time In 0909         Time Out 0933         Minutes 24              If pt is discharged prior to next therapy session, this note will serve as discharge summary.     Sruthi Madrigal, PT

## 2020-01-31 NOTE — PROGRESS NOTES
Patient bathed, she did most of bath herself. Weight this am 244.8lbs on standing scale. Up to chair.

## 2020-01-31 NOTE — PROGRESS NOTES
ROM/Therapeutic Exercise: AROM  Comment: BUE seated; Issued pt written HEP, with pt demonstrating understanding of all exercises   Exercises  Shoulder Depression: x10  Shoulder Elevation: x10  Shoulder Flexion: x10 to 90*  Elbow Flexion: x10  Elbow Extension: x10  Supination: x10  Pronation: x10  Wrist Flexion: x10  Grasp/Release: x10  Other: x15 chest press        Plan   Plan  Times per week: 4-5x/week   Current Treatment Recommendations: Balance Training, Functional Mobility Training, Endurance Training, Patient/Caregiver Education & Training, Equipment Evaluation, Education, & procurement, Self-Care / ADL, Safety Education & Training, Pain Management, Positioning, Home Management Training    Goals  Short term goals  Time Frame for Short term goals: 1 week (by 2/5/20)  Short term goal 1: Pt will complete functional transfers with Supervision or better - GOAL MET 1/31  Short term goal 2: Pt will complete LE dressing with Min A - GOAL MET 1/31  Short term goal 3: Pt will perform 3-5 minutes dynamic standing activity with SBA - GOAL MET 1/31  Short term goal 4: Pt will adhere to sternal precautions with min cues during ADL by 2/2 - GOAL MET 1/31  Patient Goals   Patient goals : \"To be able to get out of bed and get to the bathroom by myself. \"       Therapy Time   Individual Concurrent Group Co-treatment   Time In 0936         Time Out 0959         Minutes 23         Timed Code Treatment Minutes: 23 Minutes     This note to serve as d/c summary should pt d/c prior to next session.     Katty Cage, HIRENR/L

## 2020-02-02 NOTE — ANESTHESIA POSTPROCEDURE EVALUATION
Department of Anesthesiology  Postprocedure Note    Patient: Denis Alanis  MRN: [id number]  YOB: 1965  Date of evaluation: 2/1/2020  Time:  8:37 PM     Procedure Summary     Date:  01/27/20 Room / Location:  [address]    Anesthesia Start:  6535 Anesthesia Stop:  0615    Procedure:  CORONARY ARTERY BYPASS GRAFTING X 1, ON PUMP BEATING HEART, INTERNAL MAMMARY ARTERY TO LEFT ANTERIOR DESCENDING ARTERY, VAS CATH INSERTION, URI, PLATELET GEL APPLICATION, 5 LEVEL BILATERAL INTERCOSTAL NERVE BLOCK, STERNAL PLATING (N/A Chest) Diagnosis:  (-)    Surgeon:  Axel Thakkar MD Responsible Provider:  Veronica Narayanan MD    Anesthesia Type:  general ASA Status:  4          Anesthesia Type: general    Scarlett Phase I:      Scarlett Phase II:      Last vitals: Reviewed and per EMR flowsheets. Anesthesia Post Evaluation    Comments: Postoperative Anesthesia Note    Name:    Deins Alanis  MRN:      [id number]    Empty flowsheet group.        LABS:    CBC  Lab Results       Component                Value               Date/Time                  WBC                      14.6 (H)            01/31/2020 10:14 AM        HGB                      8.7 (L)             01/31/2020 10:14 AM        HCT                      27.4 (L)            01/31/2020 10:14 AM        PLT                      333                 01/31/2020 10:14 AM   RENAL  Lab Results       Component                Value               Date/Time                  NA                       129 (L)             01/31/2020 10:14 AM        K                        3.9                 01/31/2020 10:14 AM        K                        4.2                 01/27/2020 03:43 AM        CL                       89 (L)              01/31/2020 10:14 AM        CO2                      25                  01/31/2020 10:14 AM        BUN                      41 (H)              01/31/2020 10:14 AM        CREATININE               1.4 (H)

## 2020-02-06 ENCOUNTER — TELEPHONE (OUTPATIENT)
Dept: CARDIOTHORACIC SURGERY | Age: 55
End: 2020-02-06

## 2020-02-07 ENCOUNTER — TELEPHONE (OUTPATIENT)
Dept: PULMONOLOGY | Age: 55
End: 2020-02-07

## 2020-02-07 NOTE — TELEPHONE ENCOUNTER
Patient cancelled appointment on 2/12/20 with Dr. Sergio Lopez for CT f/u. Reason: Pt just had open heart surgery    Patient did reschedule appointment. Appointment rescheduled for 4/28/20. Pt r/s CT scan for 2/24/20     Last OV 11/5/19  Assessment:       · Chronic bronchitis with probable emphysema  · CAD, ischemic cardiomyopathy, mitral valve insufficiency, and chronic systolic heart failure (EF 30-35%)  · Moderate CLINT. CPAP 8 cm H2O. Declined retrying CPAP and alternative therapy   · Mild restrictive defect with decreased diffusion capacity- likely due to obesity   · Pulmonary nodules on CT 2/8/19- favor granulomas.   We'll follow-up radiographically   · 45 pack year smoking - quit 8/2019       Plan:       · Overnight pulse ox on RA  · Advised to use Spiriva daily   · Albuterol 2 puffs Q4-6 hrs PRN  · Advised to continue with smoking cessation  · CT chest 2/2020   · Patient is up to date with Pneumococcal vaccine and influenza vaccine

## 2020-02-11 ENCOUNTER — OFFICE VISIT (OUTPATIENT)
Dept: CARDIOTHORACIC SURGERY | Age: 55
End: 2020-02-11

## 2020-02-11 VITALS
WEIGHT: 254.2 LBS | HEIGHT: 65 IN | OXYGEN SATURATION: 99 % | SYSTOLIC BLOOD PRESSURE: 86 MMHG | BODY MASS INDEX: 42.35 KG/M2 | HEART RATE: 85 BPM | DIASTOLIC BLOOD PRESSURE: 50 MMHG | TEMPERATURE: 97.8 F

## 2020-02-11 PROCEDURE — 99024 POSTOP FOLLOW-UP VISIT: CPT | Performed by: THORACIC SURGERY (CARDIOTHORACIC VASCULAR SURGERY)

## 2020-02-11 RX ORDER — OXYCODONE HYDROCHLORIDE 5 MG/1
5 TABLET ORAL EVERY 8 HOURS PRN
Qty: 21 TABLET | Refills: 0 | Status: SHIPPED | OUTPATIENT
Start: 2020-02-11 | End: 2020-02-18

## 2020-02-11 NOTE — LETTER
02/11/20      Bryon Mccann M.D., Briana Domingo, Former[facility], [address] Cardiovascular and Thoracic Surgeons  [address] [facility]  [address] [place] [address]        RE:    Norberto Shah,   1965    Dear Demetrio Villalobos, DO  [address] [place] [address], [facility],    The [place]esraine Shah was seen today for routine follow-up after her recent CABG surgery. Attached is the postop note.         Sincerely,     Joanne Coleman MD    CC: Otoniel Rodríguez PA-C

## 2020-02-13 ENCOUNTER — HOSPITAL ENCOUNTER (OUTPATIENT)
Age: 55
Discharge: HOME | End: 2020-02-13
Payer: COMMERCIAL

## 2020-02-13 LAB
ALBUMIN SERPL-MCNC: 3.6 G/DL (ref 3.4–5)
ANION GAP SERPL CALCULATED.3IONS-SCNC: 14 MMOL/L (ref 3–16)
BUN BLDV-MCNC: 48 MG/DL (ref 7–20)
CALCIUM SERPL-MCNC: 9.2 MG/DL (ref 8.3–10.6)
CHLORIDE BLD-SCNC: 86 MMOL/L (ref 99–110)
CO2: 33 MMOL/L (ref 21–32)
CREAT SERPL-MCNC: 1.7 MG/DL (ref 0.6–1.1)
GFR AFRICAN AMERICAN: 38
GFR NON-AFRICAN AMERICAN: 31
GLUCOSE BLD-MCNC: 214 MG/DL (ref 70–99)
PHOSPHORUS: 3.9 MG/DL (ref 2.5–4.9)
POTASSIUM SERPL-SCNC: 3.2 MMOL/L (ref 3.5–5.1)
SODIUM BLD-SCNC: 133 MMOL/L (ref 136–145)

## 2020-02-13 PROCEDURE — 36415 COLL VENOUS BLD VENIPUNCTURE: CPT

## 2020-02-13 PROCEDURE — 80069 RENAL FUNCTION PANEL: CPT

## 2020-02-17 ENCOUNTER — TELEPHONE (OUTPATIENT)
Dept: CARDIOLOGY CLINIC | Age: 55
End: 2020-02-17

## 2020-02-19 ENCOUNTER — HOSPITAL ENCOUNTER (OUTPATIENT)
Dept: GENERAL RADIOLOGY | Age: 55
Discharge: HOME | End: 2020-02-19
Payer: COMMERCIAL

## 2020-02-19 ENCOUNTER — HOSPITAL ENCOUNTER (OUTPATIENT)
Age: 55
Discharge: HOME | End: 2020-02-19
Payer: COMMERCIAL

## 2020-02-19 PROCEDURE — 73660 X-RAY EXAM OF TOE(S): CPT

## 2020-02-20 ENCOUNTER — HOSPITAL ENCOUNTER (OUTPATIENT)
Age: 55
Discharge: HOME | End: 2020-02-20
Payer: COMMERCIAL

## 2020-02-20 LAB
ANION GAP SERPL CALCULATED.3IONS-SCNC: 18 MMOL/L (ref 3–16)
BUN BLDV-MCNC: 70 MG/DL (ref 7–20)
CALCIUM SERPL-MCNC: 9.8 MG/DL (ref 8.3–10.6)
CHLORIDE BLD-SCNC: 83 MMOL/L (ref 99–110)
CO2: 31 MMOL/L (ref 21–32)
CREAT SERPL-MCNC: 1.4 MG/DL (ref 0.6–1.1)
GFR AFRICAN AMERICAN: 47
GFR NON-AFRICAN AMERICAN: 39
GLUCOSE BLD-MCNC: 78 MG/DL (ref 70–99)
POTASSIUM SERPL-SCNC: 2.8 MMOL/L (ref 3.5–5.1)
PRO-BNP: 4640 PG/ML (ref 0–124)
SODIUM BLD-SCNC: 132 MMOL/L (ref 136–145)

## 2020-02-20 PROCEDURE — 83880 ASSAY OF NATRIURETIC PEPTIDE: CPT

## 2020-02-20 PROCEDURE — 36415 COLL VENOUS BLD VENIPUNCTURE: CPT

## 2020-02-20 PROCEDURE — 80048 BASIC METABOLIC PNL TOTAL CA: CPT

## 2020-02-20 NOTE — TELEPHONE ENCOUNTER
Spoke to patient. Not able to transmit CardioMEMs unless lies flat which she cannot do right now. She is having lots of swelling to legs. Weight was up to 250 but back to 235 lbs today. She is taking torsemide 100 mg bid in addition to the spironolactone. Most recent blood work reviewed (2/13/20) and overall stable. Asked to continue high dose torsemide 100 mg bid and repeat labs today or tomorrow. She has appointment with Dr. Inocencia Boothe tomorrow for assessment of left toe blister.      Semaj Altman, TRAY - CNP

## 2020-02-21 ENCOUNTER — OFFICE VISIT (OUTPATIENT)
Dept: VASCULAR SURGERY | Age: 55
End: 2020-02-21
Payer: COMMERCIAL

## 2020-02-21 VITALS
SYSTOLIC BLOOD PRESSURE: 100 MMHG | BODY MASS INDEX: 39.99 KG/M2 | WEIGHT: 240 LBS | HEIGHT: 65 IN | DIASTOLIC BLOOD PRESSURE: 75 MMHG

## 2020-02-21 PROBLEM — I50.22 CHRONIC SYSTOLIC HEART FAILURE (HCC): Status: ACTIVE | Noted: 2020-02-21

## 2020-02-21 PROCEDURE — 99214 OFFICE O/P EST MOD 30 MIN: CPT | Performed by: SURGERY

## 2020-02-21 PROCEDURE — 1111F DSCHRG MED/CURRENT MED MERGE: CPT | Performed by: SURGERY

## 2020-02-21 PROCEDURE — 3044F HG A1C LEVEL LT 7.0%: CPT | Performed by: SURGERY

## 2020-02-21 PROCEDURE — 1036F TOBACCO NON-USER: CPT | Performed by: SURGERY

## 2020-02-21 PROCEDURE — G8417 CALC BMI ABV UP PARAM F/U: HCPCS | Performed by: SURGERY

## 2020-02-21 PROCEDURE — 3017F COLORECTAL CA SCREEN DOC REV: CPT | Performed by: SURGERY

## 2020-02-21 PROCEDURE — G8427 DOCREV CUR MEDS BY ELIG CLIN: HCPCS | Performed by: SURGERY

## 2020-02-21 PROCEDURE — 2022F DILAT RTA XM EVC RTNOPTHY: CPT | Performed by: SURGERY

## 2020-02-21 PROCEDURE — G8482 FLU IMMUNIZE ORDER/ADMIN: HCPCS | Performed by: SURGERY

## 2020-02-21 RX ORDER — FUROSEMIDE 10 MG/ML
80 INJECTION INTRAMUSCULAR; INTRAVENOUS ONCE
Status: CANCELLED
Start: 2020-02-24

## 2020-02-22 NOTE — PROGRESS NOTES
40.56 kg/m²   CONSTITUTIONAL: Cooperative, no apparent distress, and appears well nourished / developed  NEUROLOGIC:  Awake and oriented to person, place and time. PSYCH: Calm affect. SKIN: Warm and dry. HEENT: Sclera non-icteric, normocephalic, neck supple, normal carotid pulses with no bruits and thyroid normal size. LUNGS:  No increased work of breathing and clear to auscultation, no crackles or wheezing  CARDIOVASCULAR:  Regular rate and rhythm with no murmurs, gallops, rubs, or abnormal heart sounds, normal PMI. Pulses:    femoral DP PT   RIGHT 2 2 2   LEFT 2 doppler doppler     ABDOMEN:  Normal bowel sounds, non-distended and non-tender to palpation  EXT: Tense lower extremity edema bilaterally. Healing blister left second toe. Rubor of foot    DATA:      Lower extremity duplex 1/2019  Left Impression   The left ankle/brachial index is 0.51(the DP is 40, the PT is 62mmHg. The absence of triphasic waveforms at the common femoral artery level may   indicate aorto-iliac inflow disease. Monophasic flow is seen throughout the lower extremity. There is diminished flow seen involving the superficial femoral artery stent   There is a large heterogeneous plaque seen involving the superficial femoral   artery with a >50% stenosis likely underestimated due to calcific shadowing. There is atherosclerotic plaque seen within the calf vessels consistent with   <50% stenoses. There are no significant changes compared to previous exam performed 4/18/19. Assessment:      Diagnosis Orders   1. PVD (peripheral vascular disease) ([address]     2. Diabetes mellitus type 2 in obese ([address]       In stent stenosis of left SFA. Severe claudication. Patient currently with significant lower extremity edema. Plan:     Would like patient to continue with diuretic therapy and leg elevation to decrease edema.     Will plan for lower extremity angio in near future- hopefully disease amenable to percutaneous intervention. Operative bypass surgery not advised currently as wound healing will be major issue. In addition she has had left thigh GSV harvested for prior Axillary-Brachial bypass. I have discussed all risks (including but not limited to bleeding, thrombosis, contrast allergy, renal failure, and early and late failure of intervention), benefits and alternatives of catheter-based angiography. Patient freely consents and is eager to proceed. All questions and expectations addressed.         Lisa Beltran MD, FACS

## 2020-02-24 ENCOUNTER — HOSPITAL ENCOUNTER (OUTPATIENT)
Dept: CT IMAGING | Age: 55
Discharge: HOME | End: 2020-02-24
Payer: COMMERCIAL

## 2020-02-24 ENCOUNTER — HOSPITAL ENCOUNTER (OUTPATIENT)
Dept: NURSING | Age: 55
Setting detail: INFUSION SERIES
Discharge: HOME | End: 2020-02-24
Payer: COMMERCIAL

## 2020-02-24 VITALS
SYSTOLIC BLOOD PRESSURE: 95 MMHG | HEART RATE: 84 BPM | HEIGHT: 65 IN | WEIGHT: 240 LBS | BODY MASS INDEX: 39.99 KG/M2 | DIASTOLIC BLOOD PRESSURE: 59 MMHG | RESPIRATION RATE: 24 BRPM

## 2020-02-24 DIAGNOSIS — I50.22 CHRONIC SYSTOLIC HEART FAILURE (HCC): Primary | ICD-10-CM

## 2020-02-24 LAB
ANION GAP SERPL CALCULATED.3IONS-SCNC: 19 MMOL/L (ref 3–16)
BUN BLDV-MCNC: 69 MG/DL (ref 7–20)
CALCIUM SERPL-MCNC: 9.7 MG/DL (ref 8.3–10.6)
CHLORIDE BLD-SCNC: 81 MMOL/L (ref 99–110)
CO2: 30 MMOL/L (ref 21–32)
CREAT SERPL-MCNC: 1.4 MG/DL (ref 0.6–1.1)
GFR AFRICAN AMERICAN: 47
GFR NON-AFRICAN AMERICAN: 39
GLUCOSE BLD-MCNC: 203 MG/DL (ref 70–99)
POTASSIUM SERPL-SCNC: 3.1 MMOL/L (ref 3.5–5.1)
PRO-BNP: 2219 PG/ML (ref 0–124)
SODIUM BLD-SCNC: 130 MMOL/L (ref 136–145)

## 2020-02-24 PROCEDURE — 2580000003 HC RX 258: Performed by: NURSE PRACTITIONER

## 2020-02-24 PROCEDURE — 80048 BASIC METABOLIC PNL TOTAL CA: CPT

## 2020-02-24 PROCEDURE — 99211 OFF/OP EST MAY X REQ PHY/QHP: CPT

## 2020-02-24 PROCEDURE — 96365 THER/PROPH/DIAG IV INF INIT: CPT

## 2020-02-24 PROCEDURE — 83880 ASSAY OF NATRIURETIC PEPTIDE: CPT

## 2020-02-24 PROCEDURE — 6360000002 HC RX W HCPCS: Performed by: NURSE PRACTITIONER

## 2020-02-24 PROCEDURE — 71250 CT THORAX DX C-: CPT

## 2020-02-24 RX ORDER — FUROSEMIDE 10 MG/ML
80 INJECTION INTRAMUSCULAR; INTRAVENOUS ONCE
Status: DISCONTINUED | OUTPATIENT
Start: 2020-02-24 | End: 2020-02-24 | Stop reason: SDUPTHER

## 2020-02-24 RX ORDER — FUROSEMIDE 10 MG/ML
80 INJECTION INTRAMUSCULAR; INTRAVENOUS ONCE
Status: CANCELLED
Start: 2020-02-24

## 2020-02-24 RX ADMIN — FUROSEMIDE: 10 INJECTION, SOLUTION INTRAMUSCULAR; INTRAVENOUS at 12:30

## 2020-02-24 ASSESSMENT — PAIN SCALES - GENERAL: PAINLEVEL_OUTOF10: 0

## 2020-02-24 NOTE — PROGRESS NOTES
Pt here ambulatory  with family for a Lasix injection  Pt reports she has been very tired, weak and has gained some water weight over the  past week  Pt reports she is on Lasix at home and denies need for a print out or any information about the lasix  IV # 22 started on 1st attempt to left hand  Blood for lab work drawn  Pt rose mary well  Lasiz 80 mg was started via IVPB without difficulty  Lasix bag was covered during infusion  Pt rose mary well  Family at side  Will monitor

## 2020-02-24 NOTE — PROGRESS NOTES
Lasix has infused and tubing was flushed with NS IV removed  Cath tip intact  Pressure then pressure dressing applied and secured with a coban dressing  IV site unremarkable  Pt rose mary well   Pt was up to BR  Gait steady  Discharge instructions reviewed with pt and copy was given  Understanding verbalized then pt was discharged ambulatory in stable condition with family

## 2020-02-25 ENCOUNTER — TELEPHONE (OUTPATIENT)
Dept: CARDIOLOGY CLINIC | Age: 55
End: 2020-02-25

## 2020-02-25 ENCOUNTER — OFFICE VISIT (OUTPATIENT)
Dept: CARDIOTHORACIC SURGERY | Age: 55
End: 2020-02-25

## 2020-02-25 ENCOUNTER — OFFICE VISIT (OUTPATIENT)
Dept: PULMONOLOGY | Age: 55
End: 2020-02-25
Payer: COMMERCIAL

## 2020-02-25 VITALS
BODY MASS INDEX: 39.54 KG/M2 | HEIGHT: 64 IN | HEART RATE: 74 BPM | WEIGHT: 231.6 LBS | TEMPERATURE: 98.1 F | RESPIRATION RATE: 14 BRPM | OXYGEN SATURATION: 96 % | SYSTOLIC BLOOD PRESSURE: 100 MMHG | DIASTOLIC BLOOD PRESSURE: 62 MMHG

## 2020-02-25 VITALS
OXYGEN SATURATION: 97 % | HEIGHT: 64 IN | SYSTOLIC BLOOD PRESSURE: 118 MMHG | DIASTOLIC BLOOD PRESSURE: 66 MMHG | HEART RATE: 97 BPM | WEIGHT: 233 LBS | BODY MASS INDEX: 39.78 KG/M2 | RESPIRATION RATE: 16 BRPM | TEMPERATURE: 98.3 F

## 2020-02-25 PROCEDURE — 99214 OFFICE O/P EST MOD 30 MIN: CPT | Performed by: INTERNAL MEDICINE

## 2020-02-25 PROCEDURE — 99024 POSTOP FOLLOW-UP VISIT: CPT | Performed by: THORACIC SURGERY (CARDIOTHORACIC VASCULAR SURGERY)

## 2020-02-25 PROCEDURE — G8427 DOCREV CUR MEDS BY ELIG CLIN: HCPCS | Performed by: INTERNAL MEDICINE

## 2020-02-25 PROCEDURE — 3017F COLORECTAL CA SCREEN DOC REV: CPT | Performed by: INTERNAL MEDICINE

## 2020-02-25 PROCEDURE — 1111F DSCHRG MED/CURRENT MED MERGE: CPT | Performed by: INTERNAL MEDICINE

## 2020-02-25 PROCEDURE — G8482 FLU IMMUNIZE ORDER/ADMIN: HCPCS | Performed by: INTERNAL MEDICINE

## 2020-02-25 PROCEDURE — G8417 CALC BMI ABV UP PARAM F/U: HCPCS | Performed by: INTERNAL MEDICINE

## 2020-02-25 PROCEDURE — G8926 SPIRO NO PERF OR DOC: HCPCS | Performed by: INTERNAL MEDICINE

## 2020-02-25 PROCEDURE — 3023F SPIROM DOC REV: CPT | Performed by: INTERNAL MEDICINE

## 2020-02-25 PROCEDURE — 1036F TOBACCO NON-USER: CPT | Performed by: INTERNAL MEDICINE

## 2020-02-25 NOTE — PROGRESS NOTES
[facility]                                                                  CHIEF COMPLAINT:  Follow up CT chest      HPI:   CT chest reviewed by me and noted below. Results were dicussed with patient and multiple good questions were answered.      Patient had CABG 4 weeks ago   Recovering well   No cough or sputum  No hemoptysis   Smoking 1 cig/day   Patient is compliant with inhaled bronchodilators         Past Medical History:   Diagnosis Date    Anxiety and depression     CAD (coronary artery disease) 01/2018    Cardiomyopathy ([address]     CHF (congestive heart failure) ([facility])     Chronic systolic heart failure ([address] 2/21/2020    COPD (chronic obstructive pulmonary disease) (HCC)     Diabetes mellitus (HCC)     GERD (gastroesophageal reflux disease)     Hyperlipidemia     Hypertension     Hypotension     MI (myocardial infarction) ([address]     Peripheral neuropathy     Peripheral vascular disease ([address]     [facility]monary HTN [facility]     Reflux     Sleep apnea     has never done sleep study;does not use CPAP    Wears glasses     for reading       Past Surgical History:        Procedure Laterality Date    ARTERIAL BYPASS SURGRY Left 01/06/2016    Axillary/Subclavian to Brachial Bypass w/reversed SVG    CARDIAC CATHETERIZATION  03/27/2018    Dr. Hayes Daniel - at [address]  01/20/2020    Dr. Hilaria Lerma      pancreatitis post surgery    CORONARY ANGIOPLASTY WITH STENT PLACEMENT  03/16/2018    MELODY- 3.5 x 38 and 3.5 x 20 to Cx    CORONARY ANGIOPLASTY WITH STENT PLACEMENT  01/03/2018    MELODY- 3.0 x 38 and 2.75 x 26 and 2.75 x 8 and 2.75 x 22 to the LAD    CORONARY ARTERY BYPASS GRAFT N/A 1/27/2020    CORONARY ARTERY BYPASS GRAFTING X 1, ON PUMP BEATING HEART, INTERNAL MAMMARY ARTERY TO LEFT ANTERIOR DESCENDING ARTERY, VAS CATH INSERTION, URI, PLATELET GEL APPLICATION, 5 last menstrual period 10/24/2012, SpO2 97 %, not currently breastfeeding.' on RA  Gen: No distress. Obese. BMI of 39.99  Eyes: PERRL. No sclera icterus. No conjunctival injection. ENT: No discharge. Pharynx clear. Mallampati class IV. Neck: Trachea midline. No obvious mass. Neck circumference 16  Resp: No accessory muscle use. No crackles. Few wheezes. No rhonchi. No dullness on percussion. Decreased air entry. CV: Regular rate. Regular rhythm. No murmur or rub. 1+ edema. GI: Non-tender. Non-distended. No hernia. Skin: Warm and dry. No nodule on exposed extremities. Lymph: No cervical LAD. No supraclavicular LAD. M/S: No cyanosis. No joint deformity. No clubbing. Neuro: Awake. Alert. Moves all four extremities. Psych: Oriented x 3. No anxiety. DATA reviewed by me:   PFTs 12/11/18 FVC 1.63(47%) FEV1 1.18(43%) FEV1/FVC 72% TLC 4.23(79%) DLCO 14.23(59%) 6MW 440 F low O2 86%  PFTs 12/11/18 FVC 1.63(47%) FEV1 1.18(43%) FEV1/FVC 72% TLC 4.23(79%) DLCO 14.23(59%) 6MW 440 F low O2 86%    CT chest 2/24/2020 imaging reviewed by me and showed  New curvilinear opacities left upper lobe left lower lobe likely atelectasis/scarring  Multiple new scattered subcentimeter nodules bilaterally      PSG 12/7/18 AHI 23.7 and desaturation to 87%  CPAP titration 12/20/18 CPAP 8 cm H2O      CPAP data 02/04-03/05 2019 reviewed by me. Uses 3-4  hrs/night with 47% compliance and AHI of  0.9. CPAP 8    Assessment:       · New nodules on CT 2/24/2020 -favor inflammatory. We will follow-up radiographically   · Chronic bronchitis with probable emphysema  · CAD, ischemic cardiomyopathy, mitral valve insufficiency, and chronic systolic heart failure (EF 30-35%). Post CABG 1/27/2020   · Moderate CLINT. CPAP 8 cm H2O. Declined retrying CPAP and alternative therapy.   ONPO 11/6/2019 with no significant desaturation  · Mild restrictive defect with decreased diffusion capacity- likely due to obesity   · 45 pack year smoking - quit 8/2019       Plan:       Continue Spiriva daily and Albuterol 2 puffs Q4-6 hrs PRN  Advised to continue with smoking cessation  CT chest 8/2020   Patient is up to date with Pneumococcal vaccine and influenza vaccine

## 2020-02-25 NOTE — TELEPHONE ENCOUNTER
----- Message from TRAY Boothe CNP sent at 2/24/2020  4:21 PM EST -----  BNP \"heart failure number\" improved. Potassium a bit better. Renal function about the same. Have her keep the potassium at 6 tablets daily.     TRAY Boothe CNP

## 2020-02-25 NOTE — TELEPHONE ENCOUNTER
Created telephone encounter. Spoke with Emeka Keating relayed message per [employer] regarding [employer]. Pt verbalized understanding. Pt states she is down to 225 lb. No SOB. She a lot of edema has went away.

## 2020-02-25 NOTE — LETTER
02/25/20      Lazarus Rosier, M.D., Akin Jamison, [facility], [address] Cardiovascular and Thoracic Surgeons  [address] [facility]  [id number] [place] [address]        RE:    Noemi Blunt,   1965    Dear Max Desir,   [address]  [facility], [address] [facility],    Noemi Blunt was seen today for routine follow-up after her recent CABG surgery. Attached is the postop note.         Sincerely,     Precious Coe MD    CC: Rodney Snatos PA-C

## 2020-02-26 ENCOUNTER — TELEPHONE (OUTPATIENT)
Dept: VASCULAR SURGERY | Age: 55
End: 2020-02-26

## 2020-02-26 NOTE — TELEPHONE ENCOUNTER
Spoke with patient regarding procedure at [address] [place] [address] on 3/10/20. Arrive at 6:00am/8:00am procedure. Npo after midnight. aimee

## 2020-02-28 ENCOUNTER — TELEPHONE (OUTPATIENT)
Dept: OTHER | Facility: CLINIC | Age: 55
End: 2020-02-28

## 2020-02-28 ENCOUNTER — HOSPITAL ENCOUNTER (OUTPATIENT)
Age: 55
Setting detail: OBSERVATION
Discharge: HOME | End: 2020-02-29
Attending: EMERGENCY MEDICINE | Admitting: INTERNAL MEDICINE
Payer: COMMERCIAL

## 2020-02-28 ENCOUNTER — APPOINTMENT (OUTPATIENT)
Dept: CT IMAGING | Age: 55
End: 2020-02-28
Payer: COMMERCIAL

## 2020-02-28 ENCOUNTER — APPOINTMENT (OUTPATIENT)
Dept: GENERAL RADIOLOGY | Age: 55
End: 2020-02-28
Payer: COMMERCIAL

## 2020-02-28 PROBLEM — I50.9 CONGESTIVE HEART FAILURE (HCC): Status: ACTIVE | Noted: 2020-02-28

## 2020-02-28 PROBLEM — E86.9 VOLUME DEPLETION: Status: RESOLVED | Noted: 2018-05-23 | Resolved: 2020-02-28

## 2020-02-28 PROBLEM — Z95.828 HISTORY OF ARTERIAL BYPASS OF LOWER EXTREMITY: Status: ACTIVE | Noted: 2020-02-28

## 2020-02-28 PROBLEM — F31.9 BIPOLAR DISORDER (HCC): Status: ACTIVE | Noted: 2020-02-28

## 2020-02-28 PROBLEM — E87.6 HYPOKALEMIA: Status: ACTIVE | Noted: 2020-02-28

## 2020-02-28 PROBLEM — N17.0 ACUTE RENAL FAILURE WITH TUBULAR NECROSIS (HCC): Status: RESOLVED | Noted: 2019-04-09 | Resolved: 2020-02-28

## 2020-02-28 PROBLEM — R91.1 PULMONARY NODULE: Status: ACTIVE | Noted: 2020-02-28

## 2020-02-28 PROBLEM — L03.90 CELLULITIS: Status: RESOLVED | Noted: 2019-06-01 | Resolved: 2020-02-28

## 2020-02-28 PROBLEM — R11.2 INTRACTABLE NAUSEA AND VOMITING: Status: ACTIVE | Noted: 2020-02-28

## 2020-02-28 PROBLEM — I34.0 MITRAL VALVE REGURGITATION: Status: ACTIVE | Noted: 2018-05-24

## 2020-02-28 PROBLEM — Z95.1 S/P CORONARY ARTERY BYPASS GRAFT X 1: Status: ACTIVE | Noted: 2020-02-28

## 2020-02-28 PROBLEM — R19.5 POSITIVE FIT (FECAL IMMUNOCHEMICAL TEST): Status: ACTIVE | Noted: 2020-02-28

## 2020-02-28 PROBLEM — I42.9 MYOCARDIOPATHY (HCC): Status: ACTIVE | Noted: 2019-07-08

## 2020-02-28 PROBLEM — N17.9 AKI (ACUTE KIDNEY INJURY) (HCC): Status: RESOLVED | Noted: 2019-09-29 | Resolved: 2020-02-28

## 2020-02-28 PROBLEM — E83.42 HYPOMAGNESEMIA: Status: RESOLVED | Noted: 2018-05-23 | Resolved: 2020-02-28

## 2020-02-28 PROBLEM — R73.9 ACUTE HYPERGLYCEMIA: Status: RESOLVED | Noted: 2018-05-23 | Resolved: 2020-02-28

## 2020-02-28 LAB
A/G RATIO: 1 (ref 1.1–2.2)
ALBUMIN SERPL-MCNC: 4.3 G/DL (ref 3.4–5)
ALP BLD-CCNC: 123 U/L (ref 40–129)
ALT SERPL-CCNC: 14 U/L (ref 10–40)
ANION GAP SERPL CALCULATED.3IONS-SCNC: 21 MMOL/L (ref 3–16)
AST SERPL-CCNC: 23 U/L (ref 15–37)
BILIRUB SERPL-MCNC: 0.5 MG/DL (ref 0–1)
BILIRUBIN URINE: NEGATIVE
BLOOD, URINE: NEGATIVE
BUN BLDV-MCNC: 88 MG/DL (ref 7–20)
CALCIUM SERPL-MCNC: 10.4 MG/DL (ref 8.3–10.6)
CHLORIDE BLD-SCNC: 86 MMOL/L (ref 99–110)
CLARITY: CLEAR
CO2: 28 MMOL/L (ref 21–32)
COLOR: YELLOW
CREAT SERPL-MCNC: 1.9 MG/DL (ref 0.6–1.1)
GFR AFRICAN AMERICAN: 33
GFR NON-AFRICAN AMERICAN: 27
GLOBULIN: 4.5 G/DL
GLUCOSE BLD-MCNC: 100 MG/DL (ref 70–99)
GLUCOSE URINE: NEGATIVE MG/DL
KETONES, URINE: NEGATIVE MG/DL
LACTIC ACID: 1.8 MMOL/L (ref 0.4–2)
LEUKOCYTE ESTERASE, URINE: NEGATIVE
LIPASE: 44 U/L (ref 13–60)
MICROSCOPIC EXAMINATION: NORMAL
NITRITE, URINE: NEGATIVE
PH UA: 5 (ref 5–8)
POTASSIUM SERPL-SCNC: 3 MMOL/L (ref 3.5–5.1)
PROTEIN UA: NEGATIVE MG/DL
SODIUM BLD-SCNC: 135 MMOL/L (ref 136–145)
SPECIFIC GRAVITY UA: 1.01 (ref 1–1.03)
TOTAL PROTEIN: 8.8 G/DL (ref 6.4–8.2)
TROPONIN: 0.03 NG/ML
URINE TYPE: NORMAL
UROBILINOGEN, URINE: 0.2 E.U./DL

## 2020-02-28 PROCEDURE — 2580000003 HC RX 258: Performed by: EMERGENCY MEDICINE

## 2020-02-28 PROCEDURE — 93005 ELECTROCARDIOGRAM TRACING: CPT | Performed by: EMERGENCY MEDICINE

## 2020-02-28 PROCEDURE — G0378 HOSPITAL OBSERVATION PER HR: HCPCS

## 2020-02-28 PROCEDURE — 96361 HYDRATE IV INFUSION ADD-ON: CPT

## 2020-02-28 PROCEDURE — 6370000000 HC RX 637 (ALT 250 FOR IP): Performed by: EMERGENCY MEDICINE

## 2020-02-28 PROCEDURE — 96374 THER/PROPH/DIAG INJ IV PUSH: CPT

## 2020-02-28 PROCEDURE — 6360000002 HC RX W HCPCS: Performed by: EMERGENCY MEDICINE

## 2020-02-28 PROCEDURE — 81003 URINALYSIS AUTO W/O SCOPE: CPT

## 2020-02-28 PROCEDURE — 80053 COMPREHEN METABOLIC PANEL: CPT

## 2020-02-28 PROCEDURE — 99285 EMERGENCY DEPT VISIT HI MDM: CPT

## 2020-02-28 PROCEDURE — 74176 CT ABD & PELVIS W/O CONTRAST: CPT

## 2020-02-28 PROCEDURE — 96372 THER/PROPH/DIAG INJ SC/IM: CPT

## 2020-02-28 PROCEDURE — 83605 ASSAY OF LACTIC ACID: CPT

## 2020-02-28 PROCEDURE — 71045 X-RAY EXAM CHEST 1 VIEW: CPT

## 2020-02-28 PROCEDURE — 84484 ASSAY OF TROPONIN QUANT: CPT

## 2020-02-28 PROCEDURE — 85025 COMPLETE CBC W/AUTO DIFF WBC: CPT

## 2020-02-28 PROCEDURE — 83690 ASSAY OF LIPASE: CPT

## 2020-02-28 PROCEDURE — 36415 COLL VENOUS BLD VENIPUNCTURE: CPT

## 2020-02-28 RX ORDER — ONDANSETRON 4 MG/1
4 TABLET, ORALLY DISINTEGRATING ORAL ONCE
Status: COMPLETED | OUTPATIENT
Start: 2020-02-28 | End: 2020-02-28

## 2020-02-28 RX ORDER — ATORVASTATIN CALCIUM 80 MG/1
80 TABLET, FILM COATED ORAL NIGHTLY
Qty: 90 TABLET | Refills: 3 | Status: SHIPPED | OUTPATIENT
Start: 2020-02-28 | End: 2021-01-01

## 2020-02-28 RX ORDER — SODIUM CHLORIDE 9 MG/ML
1000 INJECTION, SOLUTION INTRAVENOUS CONTINUOUS
Status: DISCONTINUED | OUTPATIENT
Start: 2020-02-29 | End: 2020-02-29

## 2020-02-28 RX ORDER — MORPHINE SULFATE 4 MG/ML
4 INJECTION, SOLUTION INTRAMUSCULAR; INTRAVENOUS ONCE
Status: COMPLETED | OUTPATIENT
Start: 2020-02-28 | End: 2020-02-28

## 2020-02-28 RX ORDER — 0.9 % SODIUM CHLORIDE 0.9 %
1000 INTRAVENOUS SOLUTION INTRAVENOUS ONCE
Status: COMPLETED | OUTPATIENT
Start: 2020-02-28 | End: 2020-02-28

## 2020-02-28 RX ORDER — POTASSIUM CHLORIDE 750 MG/1
40 TABLET, EXTENDED RELEASE ORAL 2 TIMES DAILY WITH MEALS
Status: DISCONTINUED | OUTPATIENT
Start: 2020-02-29 | End: 2020-02-28

## 2020-02-28 RX ORDER — POTASSIUM CHLORIDE 750 MG/1
40 TABLET, EXTENDED RELEASE ORAL ONCE
Status: COMPLETED | OUTPATIENT
Start: 2020-02-29 | End: 2020-02-29

## 2020-02-28 RX ORDER — DICYCLOMINE HYDROCHLORIDE 10 MG/1
10 CAPSULE ORAL ONCE
Status: COMPLETED | OUTPATIENT
Start: 2020-02-29 | End: 2020-02-29

## 2020-02-28 RX ORDER — PROMETHAZINE HYDROCHLORIDE 25 MG/ML
12.5 INJECTION, SOLUTION INTRAMUSCULAR; INTRAVENOUS ONCE
Status: COMPLETED | OUTPATIENT
Start: 2020-02-29 | End: 2020-02-28

## 2020-02-28 RX ADMIN — PROMETHAZINE HYDROCHLORIDE 12.5 MG: 25 INJECTION INTRAMUSCULAR; INTRAVENOUS at 23:57

## 2020-02-28 RX ADMIN — SODIUM CHLORIDE 1000 ML: 900 INJECTION, SOLUTION INTRAVENOUS at 23:48

## 2020-02-28 RX ADMIN — ONDANSETRON 4 MG: 4 TABLET, ORALLY DISINTEGRATING ORAL at 20:54

## 2020-02-28 RX ADMIN — SODIUM CHLORIDE 1000 ML: 9 INJECTION, SOLUTION INTRAVENOUS at 22:19

## 2020-02-28 RX ADMIN — MORPHINE SULFATE 4 MG: 4 INJECTION, SOLUTION INTRAMUSCULAR; INTRAVENOUS at 22:18

## 2020-02-28 ASSESSMENT — PAIN SCALES - GENERAL: PAINLEVEL_OUTOF10: 9

## 2020-02-29 VITALS
HEART RATE: 80 BPM | WEIGHT: 237 LBS | BODY MASS INDEX: 40.46 KG/M2 | HEIGHT: 64 IN | SYSTOLIC BLOOD PRESSURE: 96 MMHG | DIASTOLIC BLOOD PRESSURE: 62 MMHG | RESPIRATION RATE: 16 BRPM | TEMPERATURE: 97.5 F | OXYGEN SATURATION: 97 %

## 2020-02-29 LAB
ANION GAP SERPL CALCULATED.3IONS-SCNC: 15 MMOL/L (ref 3–16)
ANION GAP SERPL CALCULATED.3IONS-SCNC: 17 MMOL/L (ref 3–16)
BASOPHILS ABSOLUTE: 0.1 K/UL (ref 0–0.2)
BASOPHILS ABSOLUTE: 0.2 K/UL (ref 0–0.2)
BASOPHILS RELATIVE PERCENT: 0.7 %
BASOPHILS RELATIVE PERCENT: 1.2 %
BUN BLDV-MCNC: 79 MG/DL (ref 7–20)
BUN BLDV-MCNC: 80 MG/DL (ref 7–20)
CALCIUM SERPL-MCNC: 9.4 MG/DL (ref 8.3–10.6)
CALCIUM SERPL-MCNC: 9.6 MG/DL (ref 8.3–10.6)
CHLORIDE BLD-SCNC: 85 MMOL/L (ref 99–110)
CHLORIDE BLD-SCNC: 85 MMOL/L (ref 99–110)
CO2: 27 MMOL/L (ref 21–32)
CO2: 31 MMOL/L (ref 21–32)
CREAT SERPL-MCNC: 1.6 MG/DL (ref 0.6–1.1)
CREAT SERPL-MCNC: 1.7 MG/DL (ref 0.6–1.1)
EKG ATRIAL RATE: 88 BPM
EKG DIAGNOSIS: NORMAL
EKG P AXIS: 59 DEGREES
EKG P-R INTERVAL: 182 MS
EKG Q-T INTERVAL: 416 MS
EKG QRS DURATION: 148 MS
EKG QTC CALCULATION (BAZETT): 503 MS
EKG R AXIS: -71 DEGREES
EKG T AXIS: 56 DEGREES
EKG VENTRICULAR RATE: 88 BPM
EOSINOPHILS ABSOLUTE: 0.1 K/UL (ref 0–0.6)
EOSINOPHILS ABSOLUTE: 0.1 K/UL (ref 0–0.6)
EOSINOPHILS RELATIVE PERCENT: 0.3 %
EOSINOPHILS RELATIVE PERCENT: 0.7 %
GFR AFRICAN AMERICAN: 38
GFR AFRICAN AMERICAN: 40
GFR NON-AFRICAN AMERICAN: 31
GFR NON-AFRICAN AMERICAN: 33
GLUCOSE BLD-MCNC: 161 MG/DL (ref 70–99)
GLUCOSE BLD-MCNC: 184 MG/DL (ref 70–99)
GLUCOSE BLD-MCNC: 193 MG/DL (ref 70–99)
GLUCOSE BLD-MCNC: 300 MG/DL (ref 70–99)
GLUCOSE BLD-MCNC: 76 MG/DL (ref 70–99)
HCT VFR BLD CALC: 31.7 % (ref 36–48)
HCT VFR BLD CALC: 36.4 % (ref 36–48)
HEMOGLOBIN: 10.2 G/DL (ref 12–16)
HEMOGLOBIN: 11.6 G/DL (ref 12–16)
LYMPHOCYTES ABSOLUTE: 1.1 K/UL (ref 1–5.1)
LYMPHOCYTES ABSOLUTE: 1.3 K/UL (ref 1–5.1)
LYMPHOCYTES RELATIVE PERCENT: 10.8 %
LYMPHOCYTES RELATIVE PERCENT: 6.4 %
MAGNESIUM: 2 MG/DL (ref 1.8–2.4)
MCH RBC QN AUTO: 27 PG (ref 26–34)
MCH RBC QN AUTO: 27.2 PG (ref 26–34)
MCHC RBC AUTO-ENTMCNC: 31.9 G/DL (ref 31–36)
MCHC RBC AUTO-ENTMCNC: 32.2 G/DL (ref 31–36)
MCV RBC AUTO: 84 FL (ref 80–100)
MCV RBC AUTO: 85.1 FL (ref 80–100)
MONOCYTES ABSOLUTE: 0.7 K/UL (ref 0–1.3)
MONOCYTES ABSOLUTE: 0.8 K/UL (ref 0–1.3)
MONOCYTES RELATIVE PERCENT: 4.4 %
MONOCYTES RELATIVE PERCENT: 5.9 %
NEUTROPHILS ABSOLUTE: 10.1 K/UL (ref 1.7–7.7)
NEUTROPHILS ABSOLUTE: 15.1 K/UL (ref 1.7–7.7)
NEUTROPHILS RELATIVE PERCENT: 81.9 %
NEUTROPHILS RELATIVE PERCENT: 87.7 %
PDW BLD-RTO: 17.6 % (ref 12.4–15.4)
PDW BLD-RTO: 17.7 % (ref 12.4–15.4)
PERFORMED ON: ABNORMAL
PERFORMED ON: ABNORMAL
PERFORMED ON: NORMAL
PLATELET # BLD: 451 K/UL (ref 135–450)
PLATELET # BLD: 526 K/UL (ref 135–450)
PLATELET SLIDE REVIEW: ADEQUATE
PMV BLD AUTO: 7.9 FL (ref 5–10.5)
PMV BLD AUTO: 8.3 FL (ref 5–10.5)
POTASSIUM REFLEX MAGNESIUM: 2.9 MMOL/L (ref 3.5–5.1)
POTASSIUM SERPL-SCNC: 3.6 MMOL/L (ref 3.5–5.1)
RBC # BLD: 3.77 M/UL (ref 4–5.2)
RBC # BLD: 4.28 M/UL (ref 4–5.2)
SLIDE REVIEW: ABNORMAL
SODIUM BLD-SCNC: 127 MMOL/L (ref 136–145)
SODIUM BLD-SCNC: 133 MMOL/L (ref 136–145)
TROPONIN: 0.03 NG/ML
WBC # BLD: 12.3 K/UL (ref 4–11)
WBC # BLD: 17.2 K/UL (ref 4–11)

## 2020-02-29 PROCEDURE — 36415 COLL VENOUS BLD VENIPUNCTURE: CPT

## 2020-02-29 PROCEDURE — 83735 ASSAY OF MAGNESIUM: CPT

## 2020-02-29 PROCEDURE — 6360000002 HC RX W HCPCS: Performed by: INTERNAL MEDICINE

## 2020-02-29 PROCEDURE — 96372 THER/PROPH/DIAG INJ SC/IM: CPT

## 2020-02-29 PROCEDURE — 6370000000 HC RX 637 (ALT 250 FOR IP): Performed by: PHYSICIAN ASSISTANT

## 2020-02-29 PROCEDURE — 80048 BASIC METABOLIC PNL TOTAL CA: CPT

## 2020-02-29 PROCEDURE — G0378 HOSPITAL OBSERVATION PER HR: HCPCS

## 2020-02-29 PROCEDURE — 96376 TX/PRO/DX INJ SAME DRUG ADON: CPT

## 2020-02-29 PROCEDURE — 6370000000 HC RX 637 (ALT 250 FOR IP): Performed by: INTERNAL MEDICINE

## 2020-02-29 PROCEDURE — 6370000000 HC RX 637 (ALT 250 FOR IP): Performed by: EMERGENCY MEDICINE

## 2020-02-29 PROCEDURE — 2580000003 HC RX 258: Performed by: INTERNAL MEDICINE

## 2020-02-29 PROCEDURE — 93010 ELECTROCARDIOGRAM REPORT: CPT | Performed by: INTERNAL MEDICINE

## 2020-02-29 PROCEDURE — 6370000000 HC RX 637 (ALT 250 FOR IP)

## 2020-02-29 PROCEDURE — 85025 COMPLETE CBC W/AUTO DIFF WBC: CPT

## 2020-02-29 PROCEDURE — 96375 TX/PRO/DX INJ NEW DRUG ADDON: CPT

## 2020-02-29 PROCEDURE — 99217 PR OBSERVATION CARE DISCHARGE MANAGEMENT: CPT | Performed by: INTERNAL MEDICINE

## 2020-02-29 RX ORDER — ONDANSETRON 2 MG/ML
4 INJECTION INTRAMUSCULAR; INTRAVENOUS EVERY 6 HOURS PRN
Status: DISCONTINUED | OUTPATIENT
Start: 2020-02-29 | End: 2020-02-29

## 2020-02-29 RX ORDER — ARIPIPRAZOLE 10 MG/1
10 TABLET ORAL DAILY
Status: DISCONTINUED | OUTPATIENT
Start: 2020-02-29 | End: 2020-02-29 | Stop reason: HOSPADM

## 2020-02-29 RX ORDER — POTASSIUM CHLORIDE 20 MEQ/1
40 TABLET, EXTENDED RELEASE ORAL ONCE
Status: COMPLETED | OUTPATIENT
Start: 2020-02-29 | End: 2020-02-29

## 2020-02-29 RX ORDER — PROCHLORPERAZINE EDISYLATE 5 MG/ML
10 INJECTION INTRAMUSCULAR; INTRAVENOUS EVERY 6 HOURS PRN
Status: DISCONTINUED | OUTPATIENT
Start: 2020-02-29 | End: 2020-02-29 | Stop reason: HOSPADM

## 2020-02-29 RX ORDER — BENZTROPINE MESYLATE 1 MG/1
1 TABLET ORAL NIGHTLY
Status: DISCONTINUED | OUTPATIENT
Start: 2020-02-29 | End: 2020-02-29 | Stop reason: HOSPADM

## 2020-02-29 RX ORDER — SODIUM CHLORIDE 0.9 % (FLUSH) 0.9 %
10 SYRINGE (ML) INJECTION EVERY 12 HOURS SCHEDULED
Status: DISCONTINUED | OUTPATIENT
Start: 2020-02-29 | End: 2020-02-29 | Stop reason: HOSPADM

## 2020-02-29 RX ORDER — INSULIN GLARGINE 100 [IU]/ML
50 INJECTION, SOLUTION SUBCUTANEOUS NIGHTLY
Status: DISCONTINUED | OUTPATIENT
Start: 2020-02-29 | End: 2020-02-29 | Stop reason: HOSPADM

## 2020-02-29 RX ORDER — DEXTROSE MONOHYDRATE 50 MG/ML
100 INJECTION, SOLUTION INTRAVENOUS PRN
Status: DISCONTINUED | OUTPATIENT
Start: 2020-02-29 | End: 2020-02-29 | Stop reason: HOSPADM

## 2020-02-29 RX ORDER — ATORVASTATIN CALCIUM 40 MG/1
80 TABLET, FILM COATED ORAL NIGHTLY
Status: DISCONTINUED | OUTPATIENT
Start: 2020-02-29 | End: 2020-02-29 | Stop reason: HOSPADM

## 2020-02-29 RX ORDER — BUPRENORPHINE AND NALOXONE 8; 2 MG/1; MG/1
1 FILM, SOLUBLE BUCCAL; SUBLINGUAL 2 TIMES DAILY
Status: DISCONTINUED | OUTPATIENT
Start: 2020-02-29 | End: 2020-02-29 | Stop reason: HOSPADM

## 2020-02-29 RX ORDER — POTASSIUM CHLORIDE 20 MEQ/1
20 TABLET, EXTENDED RELEASE ORAL ONCE
Status: DISCONTINUED | OUTPATIENT
Start: 2020-02-29 | End: 2020-02-29

## 2020-02-29 RX ORDER — BUSPIRONE HYDROCHLORIDE 10 MG/1
30 TABLET ORAL 2 TIMES DAILY
Status: DISCONTINUED | OUTPATIENT
Start: 2020-02-29 | End: 2020-02-29 | Stop reason: HOSPADM

## 2020-02-29 RX ORDER — NICOTINE POLACRILEX 4 MG
15 LOZENGE BUCCAL PRN
Status: DISCONTINUED | OUTPATIENT
Start: 2020-02-29 | End: 2020-02-29 | Stop reason: HOSPADM

## 2020-02-29 RX ORDER — PANTOPRAZOLE SODIUM 40 MG/1
40 TABLET, DELAYED RELEASE ORAL 2 TIMES DAILY
Status: DISCONTINUED | OUTPATIENT
Start: 2020-02-29 | End: 2020-02-29 | Stop reason: HOSPADM

## 2020-02-29 RX ORDER — CEPHALEXIN 250 MG/1
500 CAPSULE ORAL EVERY 8 HOURS SCHEDULED
Status: DISCONTINUED | OUTPATIENT
Start: 2020-02-29 | End: 2020-02-29

## 2020-02-29 RX ORDER — HEPARIN SODIUM 5000 [USP'U]/ML
5000 INJECTION, SOLUTION INTRAVENOUS; SUBCUTANEOUS EVERY 8 HOURS SCHEDULED
Status: DISCONTINUED | OUTPATIENT
Start: 2020-02-29 | End: 2020-02-29 | Stop reason: HOSPADM

## 2020-02-29 RX ORDER — SODIUM CHLORIDE 0.9 % (FLUSH) 0.9 %
10 SYRINGE (ML) INJECTION PRN
Status: DISCONTINUED | OUTPATIENT
Start: 2020-02-29 | End: 2020-02-29 | Stop reason: HOSPADM

## 2020-02-29 RX ORDER — POTASSIUM CHLORIDE 20 MEQ/1
TABLET, EXTENDED RELEASE ORAL
Status: COMPLETED
Start: 2020-02-29 | End: 2020-02-29

## 2020-02-29 RX ORDER — ACETAMINOPHEN 650 MG/1
650 SUPPOSITORY RECTAL EVERY 6 HOURS PRN
Status: DISCONTINUED | OUTPATIENT
Start: 2020-02-29 | End: 2020-02-29 | Stop reason: HOSPADM

## 2020-02-29 RX ORDER — CLOPIDOGREL BISULFATE 75 MG/1
75 TABLET ORAL DAILY
Status: DISCONTINUED | OUTPATIENT
Start: 2020-02-29 | End: 2020-02-29 | Stop reason: HOSPADM

## 2020-02-29 RX ORDER — PROMETHAZINE HYDROCHLORIDE 25 MG/1
12.5 TABLET ORAL EVERY 6 HOURS PRN
Status: DISCONTINUED | OUTPATIENT
Start: 2020-02-29 | End: 2020-02-29

## 2020-02-29 RX ORDER — POTASSIUM CHLORIDE 7.45 MG/ML
10 INJECTION INTRAVENOUS
Status: DISCONTINUED | OUTPATIENT
Start: 2020-02-29 | End: 2020-02-29

## 2020-02-29 RX ORDER — SODIUM CHLORIDE 9 MG/ML
INJECTION, SOLUTION INTRAVENOUS CONTINUOUS
Status: DISCONTINUED | OUTPATIENT
Start: 2020-02-29 | End: 2020-02-29 | Stop reason: HOSPADM

## 2020-02-29 RX ORDER — DEXTROSE MONOHYDRATE 25 G/50ML
12.5 INJECTION, SOLUTION INTRAVENOUS PRN
Status: DISCONTINUED | OUTPATIENT
Start: 2020-02-29 | End: 2020-02-29 | Stop reason: HOSPADM

## 2020-02-29 RX ORDER — ASPIRIN 81 MG/1
81 TABLET ORAL DAILY
Status: DISCONTINUED | OUTPATIENT
Start: 2020-02-29 | End: 2020-02-29 | Stop reason: HOSPADM

## 2020-02-29 RX ORDER — METOCLOPRAMIDE HYDROCHLORIDE 5 MG/ML
10 INJECTION INTRAMUSCULAR; INTRAVENOUS EVERY 6 HOURS
Status: DISCONTINUED | OUTPATIENT
Start: 2020-02-29 | End: 2020-02-29 | Stop reason: HOSPADM

## 2020-02-29 RX ORDER — POLYETHYLENE GLYCOL 3350 17 G/17G
17 POWDER, FOR SOLUTION ORAL DAILY PRN
Status: DISCONTINUED | OUTPATIENT
Start: 2020-02-29 | End: 2020-02-29 | Stop reason: HOSPADM

## 2020-02-29 RX ORDER — ACETAMINOPHEN 325 MG/1
650 TABLET ORAL EVERY 6 HOURS PRN
Status: DISCONTINUED | OUTPATIENT
Start: 2020-02-29 | End: 2020-02-29 | Stop reason: HOSPADM

## 2020-02-29 RX ADMIN — Medication 10 ML: at 09:48

## 2020-02-29 RX ADMIN — CEPHALEXIN 500 MG: 250 CAPSULE ORAL at 04:43

## 2020-02-29 RX ADMIN — POTASSIUM CHLORIDE 40 MEQ: 1500 TABLET, EXTENDED RELEASE ORAL at 06:53

## 2020-02-29 RX ADMIN — METOCLOPRAMIDE 10 MG: 5 INJECTION, SOLUTION INTRAMUSCULAR; INTRAVENOUS at 08:50

## 2020-02-29 RX ADMIN — METOPROLOL TARTRATE 12.5 MG: 25 TABLET, FILM COATED ORAL at 08:48

## 2020-02-29 RX ADMIN — BUSPIRONE HYDROCHLORIDE 30 MG: 10 TABLET ORAL at 08:48

## 2020-02-29 RX ADMIN — CLOPIDOGREL BISULFATE 75 MG: 75 TABLET ORAL at 08:50

## 2020-02-29 RX ADMIN — ASPIRIN 81 MG: 81 TABLET, COATED ORAL at 08:49

## 2020-02-29 RX ADMIN — LAMOTRIGINE 150 MG: 100 TABLET ORAL at 08:49

## 2020-02-29 RX ADMIN — POTASSIUM CHLORIDE 40 MEQ: 750 TABLET, EXTENDED RELEASE ORAL at 00:18

## 2020-02-29 RX ADMIN — SODIUM CHLORIDE: 9 INJECTION, SOLUTION INTRAVENOUS at 02:53

## 2020-02-29 RX ADMIN — BUPRENORPHINE HYDROCHLORIDE, NALOXONE HYDROCHLORIDE 1 FILM: 8; 2 FILM, SOLUBLE BUCCAL; SUBLINGUAL at 02:54

## 2020-02-29 RX ADMIN — Medication 10 ML: at 09:46

## 2020-02-29 RX ADMIN — METOCLOPRAMIDE 10 MG: 5 INJECTION, SOLUTION INTRAMUSCULAR; INTRAVENOUS at 02:57

## 2020-02-29 RX ADMIN — HEPARIN SODIUM 5000 UNITS: 5000 INJECTION, SOLUTION INTRAVENOUS; SUBCUTANEOUS at 06:53

## 2020-02-29 RX ADMIN — BUPRENORPHINE HYDROCHLORIDE, NALOXONE HYDROCHLORIDE 1 FILM: 8; 2 FILM, SOLUBLE BUCCAL; SUBLINGUAL at 08:47

## 2020-02-29 RX ADMIN — POTASSIUM CHLORIDE: 20 TABLET, EXTENDED RELEASE ORAL at 12:00

## 2020-02-29 RX ADMIN — METOCLOPRAMIDE 10 MG: 5 INJECTION, SOLUTION INTRAMUSCULAR; INTRAVENOUS at 13:07

## 2020-02-29 RX ADMIN — PANTOPRAZOLE SODIUM 40 MG: 40 TABLET, DELAYED RELEASE ORAL at 08:47

## 2020-02-29 RX ADMIN — INSULIN LISPRO 2 UNITS: 100 INJECTION, SOLUTION INTRAVENOUS; SUBCUTANEOUS at 13:08

## 2020-02-29 RX ADMIN — POTASSIUM CHLORIDE 40 MEQ: 20 TABLET, EXTENDED RELEASE ORAL at 11:11

## 2020-02-29 RX ADMIN — POTASSIUM CHLORIDE 40 MEQ: 1500 TABLET, EXTENDED RELEASE ORAL at 10:39

## 2020-02-29 RX ADMIN — INSULIN LISPRO 8 UNITS: 100 INJECTION, SOLUTION INTRAVENOUS; SUBCUTANEOUS at 08:55

## 2020-02-29 RX ADMIN — LAMOTRIGINE 150 MG: 100 TABLET ORAL at 02:56

## 2020-02-29 RX ADMIN — DICYCLOMINE HYDROCHLORIDE 10 MG: 10 CAPSULE ORAL at 00:18

## 2020-02-29 RX ADMIN — PROCHLORPERAZINE EDISYLATE 10 MG: 5 INJECTION INTRAMUSCULAR; INTRAVENOUS at 09:46

## 2020-02-29 ASSESSMENT — PAIN DESCRIPTION - PROGRESSION
CLINICAL_PROGRESSION: NOT CHANGED

## 2020-02-29 ASSESSMENT — PAIN SCALES - GENERAL
PAINLEVEL_OUTOF10: 2
PAINLEVEL_OUTOF10: 0

## 2020-02-29 ASSESSMENT — PAIN SCALES - WONG BAKER
WONGBAKER_NUMERICALRESPONSE: 0

## 2020-02-29 ASSESSMENT — PAIN DESCRIPTION - PAIN TYPE: TYPE: CHRONIC PAIN

## 2020-02-29 ASSESSMENT — PAIN DESCRIPTION - LOCATION: LOCATION: BACK

## 2020-02-29 NOTE — PROGRESS NOTES
Shift assessment completed. Pt c/o some nausea. Pt states she is tolerating full liquids well and wants to try solid food. K+ is 2.9 today. Oral KCl replacement ordered. Recheck K+ at 1300 and let MD know. Pt resting in bed with call light in reach.

## 2020-02-29 NOTE — H&P
CATHETERIZATION  03/27/2018    Dr. Christina Menendez - at [address]  01/20/2020    Dr. Lynnette Ortiz      pancreatitis post surgery    CORONARY ANGIOPLASTY WITH STENT PLACEMENT  03/16/2018    MELODY- 3.5 x 38 and 3.5 x 20 to Cx    CORONARY ANGIOPLASTY WITH STENT PLACEMENT  01/03/2018    MELODY- 3.0 x 38 and 2.75 x 26 and 2.75 x 8 and 2.75 x 22 to the LAD    CORONARY ARTERY BYPASS GRAFT N/A 1/27/2020    CORONARY ARTERY BYPASS GRAFTING X 1, ON PUMP BEATING HEART, INTERNAL MAMMARY ARTERY TO LEFT ANTERIOR DESCENDING ARTERY, VAS CATH INSERTION, URI, PLATELET GEL APPLICATION, 5 LEVEL BILATERAL INTERCOSTAL NERVE BLOCK, STERNAL PLATING performed by Nickie Wilkerson MD at [address] Right 5/16/2019    fem-pop, performed by Mary Kay Avila MD at [address]  02/14/2019    CardioMEMs insertion for CHF    ROTATOR CUFF REPAIR Left 04/22/2016    TONSILLECTOMY      TRANSLUMINAL ANGIOPLASTY Bilateral 10/08/2019    BLE w/PTA occluded L SFA and restenting L Prox SFA    TUMOR EXCISION      benign tumors X 2 chest & axilla    UPPER GASTROINTESTINAL ENDOSCOPY  4/25/2013    BX barretts, BAYLEE, gastritis    UPPER GASTROINTESTINAL ENDOSCOPY  12/10/2015    UPPER GASTROINTESTINAL ENDOSCOPY  01/06/2017    Gastritis       Medications Prior to Admission:      Prior to Admission medications    Medication Sig Start Date End Date Taking?  Authorizing Provider   atorvastatin (LIPITOR) 80 MG tablet Take 1 tablet by mouth nightly 2/28/20   Didi Denson, APRN - CNP   tiotropium (SPIRIVA RESPIMAT) 2.5 MCG/ACT AERS inhaler INHALE 2 PUFFS INTO THE LUNGS DAILY 2/25/20   George Richmond MD   metoprolol tartrate (LOPRESSOR) 25 MG tablet Take 0.5 tablets by mouth 2 times daily 2/11/20   Nickie Wilkerson MD   potassium chloride (KLOR-CON M) 20 MEQ extended release tablet Take 40 mEq by mouth 2 times daily     Historical Provider, MD   buprenorphine-naloxone (SUBOXONE) 8-2 MG FILM SL film Place 1 Film under the tongue 2 times daily. Historical Provider, MD   albuterol sulfate  (90 Base) MCG/ACT inhaler Inhale 2 puffs into the lungs every 6 hours as needed for Wheezing or Shortness of Breath    Historical Provider, MD   spironolactone (ALDACTONE) 100 MG tablet Take 0.5 tablets by mouth 2 times daily 1/31/20   TRAY Oleary CNP   torsemide [facility]) 100 MG tablet Take 0.5 tablets by mouth 2 times daily 1/31/20   TRAY Oleary CNP   benztropine (COGENTIN) 1 MG tablet Take 1 mg by mouth nightly  12/13/19   Historical Provider, MD   blood glucose test strips (EXACTECH TEST) strip 6 times daily.  12/19/19   TRAY Cunha CNP   Insulin Pen Needle (B-D ULTRAFINE III SHORT PEN) 31G X 8 MM MISC Five shots a day 12/3/19   TRAY Cunha CNP   INSULIN SYRINGE .5CC/29G 29G X 1/2\" 0.5 ML MISC 1 each by Does not apply route daily 12/3/19   TRAY Cunha CNP   Insulin Degludec (TRESIBA FLEXTOUCH) 100 UNIT/ML SOPN Inject 100 Units into the skin nightly 12/3/19   TRAY Cunha CNP   Liraglutide (VICTOZA) 18 MG/3ML SOPN SC injection Inject 1.2 mg into the skin daily 10/29/19   TRAY Cunha CNP   vitamin D (ERGOCALCIFEROL) 1.25 MG (62190 UT) CAPS capsule Take 1 capsule by mouth once a week 10/29/19   TRAY Cunha CNP   pantoprazole (PROTONIX) 40 MG tablet Take 1 tablet by mouth 2 times daily 10/1/19   Isma Suero MD   clopidogrel (PLAVIX) 75 MG tablet TAKE 1 TABLET BY MOUTH EVERY DAY 9/3/19   TRAY Haridn CNP   magnesium oxide (MAG-OX) 400 (240 Mg) MG tablet TAKE 1 TABLET ONCE DAILY 5/1/19   Guillermo Farfan MD   insulin lispro (HUMALOG KWIKPEN) 100 UNIT/ML pen Inject 15 Units into the skin 3 times daily (before meals) 4/9/19   Candy Borges MD   busPIRone (BUSPAR) 30 MG tablet Take 30 mg by mouth 2 times daily  4/2/18   Historical Provider, MD   aspirin EC 81 MG EC tablet Take 1 tablet by mouth respiratory effort. Clear to auscultation, bilaterally without Rales/Wheezes/Rhonchi. Cardiovascular:  Regular rate and rhythm with normal S1/S2 without murmurs, rubs or gallops. Abdomen: Soft, non-tender, non-distended with normal bowel sounds. Musculoskeletal:  No clubbing, cyanosis or edema bilaterally. Full range of motion without deformity. Skin: Some erythema around the sternal incision with scant serous drainage, lower extremity erythema could be secondary to chronic venous stasis. Neurologic:  Neurovascularly intact without any focal sensory/motor deficits. Cranial nerves: II-XII intact, grossly non-focal.  Psychiatric:  Alert and oriented, thought content appropriate, normal insight  Capillary Refill: Brisk,< 3 seconds   Peripheral Pulses: +2 palpable, equal bilaterally       Labs:   Recent Labs     02/28/20 2045   WBC 17.2*   HGB 11.6*   HCT 36.4   *     Recent Labs     02/28/20 2045   *   K 3.0*   CL 86*   CO2 28   BUN 88*   CREATININE 1.9*   CALCIUM 10.4     Recent Labs     02/28/20 2045   AST 23   ALT 14   BILITOT 0.5   ALKPHOS 123     No results for input(s): INR in the last 72 hours. Recent Labs     02/28/20 2045 02/28/20  2340   TROPONINI 0.03* 0.03*       Urinalysis:   Lab Results   Component Value Date    NITRU Negative 02/28/2020    WBCUA 3-5 09/28/2019    BACTERIA Rare 09/28/2019    RBCUA None seen 09/28/2019    BLOODU Negative 02/28/2020    SPECGRAV 1.010 02/28/2020    GLUCOSEU Negative 02/28/2020       Radiology:   EKG:  I have reviewed the EKG with the following interpretation: Normal sinus rhythm per report, did not get EKG. XR CHEST PORTABLE   Final Result   No acute disease. CT ABDOMEN PELVIS WO CONTRAST Additional Contrast? None   Final Result   1. No acute findings within the abdomen or pelvis. 2. Normal appendix   3.  Prior cholecystectomy             ASSESSMENT:  Intractable nausea and vomiting, could be secondary to gastroenteritis  CAD status post CABG  Ischemic cardiomyopathy with a EF of 35 to 40%  Diabetes type 2  SHAUNNA on CKD stage III  Hypertension  Leukocytosis, ? Cellulitic change on sternal incision with some serous drainage  Bipolar   History of alcohol abuse on subutrex  Morbid obesity    PLAN:  Admit for supportive care, IV fluids. Consult nephrology with SHAUNNA and elevated BUN. -Hold Aldactone and diuretics at this time  -Gentle IV fluids, watch for volume overload  -Strict I's and O's  -Scheduled Reglan IV, full liquid diet, advance diet as tolerated  -Supportive care,  - p.o. Keflex for possible cellulitis of sternal wound  -Continue antiplatelet therapy plus statins  -Hold long-acting insulin until patient is able to tolerate p.o.  - Resume rest of home meds     DVT Prophylaxis: Heparin  Diet: DIET FULL LIQUID; Carb Control: 3 carb choices (45 gms)/meal  Code Status: Full Code    Dispo - Admit to med surg on telemetry       Thank you for the opportunity to be involved in this patient's care.       (Please note that portions of this note were completed with a voice recognition program. Efforts were made to edit the dictations but occasionally words are mis-transcribed.)

## 2020-02-29 NOTE — DISCHARGE SUMMARY
Name:  Hema Nava  Room:  0219/0219-01  MRN:    [id number]    Discharge Summary      This discharge summary is in conjunction with a complete physical exam done on the day of discharge. Attending Physician:  Monique Fulton     Discharging Physician: Monique Fulton       Admit: 2/28/2020  Discharge:  2/29/2020    Diagnoses this Admission    Principal Problem:    Intractable nausea and vomiting  Active Problems:    CAD (coronary artery disease)    Diabetes mellitus type 2 in [facility])    Essential hypertension    SHAUNNA (acute kidney injury) ([facility] [address]    CKD (chronic kidney disease) stage 3, GFR 30-59 ml/min ([facility])    Acute kidney injury ([facility] [address]    Hypokalemia    Intractable vomiting    Dehydration  Resolved Problems:    * No resolved hospital problems. *        Procedures (Please Review Full Report for Details)  none      Consults    none    HPI:    62 y.o. female with a history of ischemic cardiomyopathy status post CABG at the end of January who presented to the emergency department with a chief complaint of intractable nausea and vomiting. The patient says her symptoms started this morning. She could not stop throwing up. She also endorses abdominal pain but denies diarrhea. Yesterday she went out for dinner with family members but today she is only one that has been ill. She has been retching and vomiting all day. She try to sleep through it but her symptoms have persisted. She came to the emergency room to get evaluated. In the emergency department she was noted to have SHAUNNA and elevated BUN. She continued to have symptoms so it was decided to admit her for observation nephrology evaluation. She denies any chest pain, fevers or chills. She denies any sick contacts. Physical Exam  Constitutional:       Appearance: Normal appearance. HENT:      Head: Normocephalic and atraumatic.       Nose: Nose normal.      Mouth/Throat:      Mouth: Mucous membranes are moist.   Eyes: Extraocular Movements: Extraocular movements intact. Pupils: Pupils are equal, round, and reactive to light. Neck:      Musculoskeletal: Normal range of motion and neck supple. No neck rigidity. Cardiovascular:      Rate and Rhythm: Normal rate and regular rhythm. Pulses: Normal pulses. Pulmonary:      Effort: Pulmonary effort is normal. No respiratory distress. Breath sounds: Normal breath sounds. Neurological:      Mental Status: She is alert. I inspected her wounds from her CABG. No evidence of cellulitis. Hospital Course    49-year-old white female with  Diabetes. She came to the emergency room with nausea vomiting she is admitted to hospital started on IV fluids. She started a clear liquid diet. Diet was advanced. Today she denies any issues with nausea vomiting. She wants to go home. Patient has chronic kidney disease. Work-up showed mild SHAUNNA on CKD. This is resolved with IV fluids. Patient had mild cellulitis likely dehydration. This is improved with IV fluids. At the time of discharge her home medications resumed. He is feeling much better. Her potassium was replaced. She will follow-up with her PCP. CBC:   Recent Labs     02/28/20 2045 02/29/20  0544   WBC 17.2* 12.3*   HGB 11.6* 10.2*   HCT 36.4 31.7*   MCV 85.1 84.0   * 451*     BMP:   Recent Labs     02/28/20 2045 02/29/20  0544 02/29/20  1242   * 133* 127*   K 3.0* 2.9* 3.6   CL 86* 85* 85*   CO2 28 31 27   BUN 88* 79* 80*   CREATININE 1.9* 1.6* 1.7*     LIVER PROFILE:   Recent Labs     02/28/20 2045   AST 23   ALT 14   LIPASE 44.0   BILITOT 0.5   ALKPHOS 123     PT/INR: No results for input(s): PROTIME, INR in the last 72 hours. APTT: No results for input(s): APTT in the last 72 hours.   UA:  Recent Labs     02/28/20  2000   COLORU Yellow   PHUR 5.0   CLARITYU Clear   SPECGRAV 1.010   LEUKOCYTESUR Negative   UROBILINOGEN 0.2   BILIRUBINUR Negative   BLOODU Negative   GLUCOSEU Negative    Results for Otrosaia Mass (MRN [id number]) as of 2/29/2020 16:59   Ref. Range 2/28/2020 22:15   Lactic Acid Latest Ref Range: 0.4 - 2.0 mmol/L 1.8       XR CHEST PORTABLE   Final Result   No acute disease. CT ABDOMEN PELVIS WO CONTRAST Additional Contrast? None   Final Result   1. No acute findings within the abdomen or pelvis. 2. Normal appendix   3. Prior cholecystectomy              Discharge Medications     Medication List      CONTINUE taking these medications    albuterol sulfate  (90 Base) MCG/ACT inhaler     ARIPiprazole 10 MG tablet  Commonly known as:  ABILIFY     aspirin EC 81 MG EC tablet  Take 1 tablet by mouth daily     atorvastatin 80 MG tablet  Commonly known as:  LIPITOR  Take 1 tablet by mouth nightly     benztropine 1 MG tablet  Commonly known as:  COGENTIN     blood glucose test strips strip  Commonly known as:  [employer] Test  6 times daily.      busPIRone 30 MG tablet  Commonly known as:  BUSPAR     clopidogrel 75 MG tablet  Commonly known as:  PLAVIX  TAKE 1 TABLET BY MOUTH EVERY DAY     Insulin Degludec 100 UNIT/ML Sopn  Commonly known as:  [place] FlexTouch  Inject 100 Units into the skin nightly     insulin lispro 100 UNIT/ML pen  Commonly known as:  HumaLOG KwikPen  Inject 15 Units into the skin 3 times daily (before meals)     Insulin Pen Needle 31G X 8 MM Misc  Commonly known as:  B-D ULTRAFINE III SHORT PEN  Five shots a day     INSULIN SYRINGE .5CC/29G 29G X 1/2\" 0.5 ML Misc  1 each by Does not apply route daily     lamoTRIgine 150 MG tablet  Commonly known as:  LAMICTAL     Liraglutide 18 MG/3ML Sopn SC injection  Commonly known as:  Victoza  Inject 1.2 mg into the skin daily     magnesium oxide 400 (240 Mg) MG tablet  Commonly known as:  MAG-OX  TAKE 1 TABLET ONCE DAILY     metoprolol tartrate 25 MG tablet  Commonly known as:  LOPRESSOR  Take 0.5 tablets by mouth 2 times daily     pantoprazole 40 MG tablet  Commonly known as:  PROTONIX  Take 1 tablet by mouth 2 times daily     potassium chloride 20 MEQ extended release tablet  Commonly known as:  KLOR-CON M     spironolactone 100 MG tablet  Commonly known as:  ALDACTONE  Take 0.5 tablets by mouth 2 times daily     Suboxone 8-2 MG Film SL film  Generic drug:  buprenorphine-naloxone     tiotropium 2.5 MCG/ACT Aers inhaler  Commonly known as:  Spiriva Respimat  INHALE 2 PUFFS INTO THE LUNGS DAILY     torsemide 100 MG tablet  Commonly known as:  DEMADEX  Take 0.5 tablets by mouth 2 times daily     vitamin D 1.25 MG (37738 UT) Caps capsule  Commonly known as:  ERGOCALCIFEROL  Take 1 capsule by mouth once a week              Discharge Condition/Location: Stable    Follow Up: Follow up with PCP.         Kelli Campbell 2/29/2020 4:58 PM

## 2020-02-29 NOTE — PROGRESS NOTES
Pt A/O in bed and quiet. Denies any needs at this time. SR up x2, bed in lowest position, wheels locked. Call light and bedside table in easy reach.    Nayely Roman RN

## 2020-02-29 NOTE — ED NOTES
Pt medicated for nausea, Sam Morocho RN attempting IV at this time.       Shanti Sarmiento RN  02/28/20 2055

## 2020-02-29 NOTE — ED PROVIDER NOTES
TECHNOLOGIST PROVIDED HISTORY: Reason for exam:->see list Reason for Exam: yearly f/u for pulmonary nodule, no current complaints, hx open heart surgery  4 wks ago Acuity: Chronic Type of Exam: Subsequent/Follow-up FINDINGS: Mediastinum: The thyroid is unremarkable. The patient is status post left axillary dissection. There is stable mild borderline enlarged mediastinal lymphadenopathy, with the largest lymph node measuring 12 mm in short axis within the subcarinal space, unchanged. No new pathologic lymphadenopathy is identified. There is atherosclerotic disease of the thoracic aorta, without evidence of aneurysm. There are post CABG changes evident. There is native coronary atherosclerosis. No pericardial abnormality is identified. Lungs/pleura: The central airways are patent. There is stable chronic pleural and parenchymal scarring noted within the right middle lobe and lingula. There are new curvilinear bands of opacity within the apical left upper lobe, which likely represent either atelectasis or developing parenchymal scar. There are similar curvilinear opacities also noted within the left lower lobe, also likely atelectasis or scar. Multifocal pneumonia is considered less likely. No additional focus of airspace consolidation is identified. There is no evidence of a pneumothorax or pleural effusion. Scattered calcified granulomata are noted. There are multiple new scattered subcentimeter nodules throughout both lungs, some of which demonstrate a tree-in-bud distribution, which appear new in comparison with the study of 02/08/2019. The largest of these measures approximately 4 mm within the left lower lobe. No dominant pulmonary mass is identified. Upper Abdomen: The patient is status post cholecystectomy with mild chronic pneumobilia. There is a stable 11 mm left adrenal adenoma. The remainder of the upper abdomen is unremarkable. Soft Tissues/Bones:  There is degenerative change throughout in time range)   clopidogrel (PLAVIX) tablet 75 mg (has no administration in time range)   insulin glargine (LANTUS) injection vial 50 Units (has no administration in time range)   lamoTRIgine (LAMICTAL) tablet 150 mg (150 mg Oral Given 2/29/20 0256)   metoprolol tartrate (LOPRESSOR) tablet 12.5 mg (12.5 mg Oral Not Given 2/29/20 0225)   pantoprazole (PROTONIX) tablet 40 mg (has no administration in time range)   sodium chloride flush 0.9 % injection 10 mL (has no administration in time range)   sodium chloride flush 0.9 % injection 10 mL (has no administration in time range)   acetaminophen (TYLENOL) tablet 650 mg (has no administration in time range)     Or   acetaminophen (TYLENOL) suppository 650 mg (has no administration in time range)   polyethylene glycol (GLYCOLAX) packet 17 g (has no administration in time range)   promethazine (PHENERGAN) tablet 12.5 mg (has no administration in time range)     Or   ondansetron (ZOFRAN) injection 4 mg (has no administration in time range)   glucose (GLUTOSE) 40 % oral gel 15 g (has no administration in time range)   dextrose 50 % IV solution (has no administration in time range)   glucagon (rDNA) injection 1 mg (has no administration in time range)   dextrose 5 % solution (has no administration in time range)   0.9 % sodium chloride infusion ( Intravenous New Bag 2/29/20 0253)   insulin lispro (HUMALOG) injection vial 0-12 Units (has no administration in time range)   insulin lispro (HUMALOG) injection vial 0-6 Units (has no administration in time range)   heparin (porcine) injection 5,000 Units (has no administration in time range)   metoclopramide (REGLAN) injection 10 mg (10 mg Intravenous Given 2/29/20 0257)   cephALEXin (KEFLEX) capsule 500 mg (500 mg Oral Given 2/29/20 0443)   ondansetron (ZOFRAN-ODT) disintegrating tablet 4 mg (4 mg Oral Given 2/28/20 2054)   0.9 % sodium chloride bolus (0 mLs Intravenous Stopped 2/28/20 2348)   morphine sulfate (PF) injection 4 mg (4 mg Intravenous Given 2/28/20 2218)   dicyclomine (BENTYL) capsule 10 mg (10 mg Oral Given 2/29/20 0018)   potassium chloride (KLOR-CON M) extended release tablet 40 mEq (40 mEq Oral Given 2/29/20 0018)   promethazine (PHENERGAN) injection 12.5 mg (12.5 mg Intramuscular Given 2/28/20 8117)        CLINICAL IMPRESSION  1. Intractable vomiting with nausea, unspecified vomiting type    2. SHAUNNA (acute kidney injury) ([facility] [address]    3. Dehydration    4. Uremia        Blood pressure 92/65, pulse 93, temperature 97.1 °F (36.2 °C), temperature source Oral, resp. rate 17, height 5' 4\" (1.626 m), weight 237 lb (107.5 kg), last menstrual period 10/24/2012, SpO2 98 %, not currently breastfeeding. Patient was given scripts for the following medications. I counseled patient how to take these medications. Current Discharge Medication List          Follow-up with:  Ren Collazo PA-C  [facility] [address]   [phone]            DISCLAIMER: This chart was created using Dragon dictation software. Efforts were made by me to ensure accuracy, however some errors may be present due to limitations of this technology and occasionally words are not transcribed correctly.        Nadira Estrada  02/29/20 9187

## 2020-02-29 NOTE — CARE COORDINATION
Chart review and screening trigger by recent CABG. Brief interview and DCP needs assessment. IPTA, lives w/ friend and daughter. Denies any New HC or DME needs. She was connected with [facility] after her CABG but has since been released and does not require any HHC at this time. She does not anticipate and financial barriers w/filling her prescriptions and anticipates DC home later today. CM is not following.

## 2020-02-29 NOTE — PROGRESS NOTES
Pt brought to 2W a/o via stretcher. Admission questions were completed at this time and pt was oriented to room     4 Eyes Skin Assessment     The patient is being assess for   Admission    I agree that 2 RN's have performed a thorough Head to Toe Skin Assessment on the patient. ALL assessment sites listed below have been assessed. Areas assessed by both nurses:   [x]   Head, Face, and Ears   [x]   Shoulders, Back, and Chest, Abdomen  [x]   Arms, Elbows, and Hands   [x]   Coccyx, Sacrum, and Ischium  [x]   Legs, Feet, and Heels        Pt has scattered bruising, scattered scabs to BLE, Pt LE are red and swollen. Pt has a surgical incsion midline upper chest, scabbed with some redness, second toe on L foot has old dry healing blister. Co-signer eSignature: Electronically signed by Cherry Jean RN on 2/29/20 at 5:16 AM    Does the Patient have Skin Breakdown?   No          Vivek Prevention initiated:  No   Wound Care Orders initiated:  No      WOC nurse consulted for Pressure Injury (Stage 3,4, Unstageable, DTI, NWPT, Complex wounds)and New or Established Ostomies:  No      Primary Nurse eSignature:Juan Padron RN

## 2020-02-29 NOTE — PROGRESS NOTES
Pt with d/c order. Reviewed d/c instructions with Pt. Removed IV. Pt has no new Rx. Pt verbalized understanding. Pt d/c off unit per wheelchair with all belongings.

## 2020-02-29 NOTE — ED NOTES
PT STS SHE IS NAUSEATED AGAIN. ICE CHIPS HELD AND ORAL MEDICATIONS.      Maya Ledbetter, RN  02/28/20 [id number]

## 2020-02-29 NOTE — PROGRESS NOTES
Shift assessment complete - See flow sheet. Nightly medications given - See [facility]         Patient with no complaints of pain at this time. Patient denies any further needs. Bed alarm is set for patient safety.  Call light explained and in reach

## 2020-03-03 ENCOUNTER — OFFICE VISIT (OUTPATIENT)
Dept: ENDOCRINOLOGY | Age: 55
End: 2020-03-03
Payer: COMMERCIAL

## 2020-03-03 VITALS
HEIGHT: 64 IN | SYSTOLIC BLOOD PRESSURE: 110 MMHG | BODY MASS INDEX: 40.46 KG/M2 | OXYGEN SATURATION: 97 % | WEIGHT: 237 LBS | HEART RATE: 90 BPM | RESPIRATION RATE: 14 BRPM | DIASTOLIC BLOOD PRESSURE: 74 MMHG

## 2020-03-03 PROCEDURE — G8417 CALC BMI ABV UP PARAM F/U: HCPCS | Performed by: NURSE PRACTITIONER

## 2020-03-03 PROCEDURE — 1036F TOBACCO NON-USER: CPT | Performed by: NURSE PRACTITIONER

## 2020-03-03 PROCEDURE — 3017F COLORECTAL CA SCREEN DOC REV: CPT | Performed by: NURSE PRACTITIONER

## 2020-03-03 PROCEDURE — 3044F HG A1C LEVEL LT 7.0%: CPT | Performed by: NURSE PRACTITIONER

## 2020-03-03 PROCEDURE — 2022F DILAT RTA XM EVC RTNOPTHY: CPT | Performed by: NURSE PRACTITIONER

## 2020-03-03 PROCEDURE — G8427 DOCREV CUR MEDS BY ELIG CLIN: HCPCS | Performed by: NURSE PRACTITIONER

## 2020-03-03 PROCEDURE — 99213 OFFICE O/P EST LOW 20 MIN: CPT | Performed by: NURSE PRACTITIONER

## 2020-03-03 PROCEDURE — G8482 FLU IMMUNIZE ORDER/ADMIN: HCPCS | Performed by: NURSE PRACTITIONER

## 2020-03-03 ASSESSMENT — ENCOUNTER SYMPTOMS
CONSTIPATION: 0
DIARRHEA: 0
EYE PAIN: 0
NAUSEA: 0
COLOR CHANGE: 0
SHORTNESS OF BREATH: 0

## 2020-03-03 NOTE — ASSESSMENT & PLAN NOTE
Lab Results   Component Value Date    LABA1C 6.1 01/27/2020     At goal  Almost not needing humalog any more  Continue to titrate Belkis Balsam  Continue on victoza at current dose  Discussed diet and hydration

## 2020-03-03 NOTE — PROGRESS NOTES
Endocrinology  [facility]  Phone: [phone]   FAX: [phone]    Saint Malone is a 62 y.o. female who is a new patient presenting for management of Diabetes Mellitus Type 2. Last A1C:   Lab Results   Component Value Date    LABA1C 6.1 01/27/2020    LABA1C 7.3 12/03/2019    LABA1C 9.2 09/18/2019     Last BP Readings:  BP Readings from Last 3 Encounters:   03/03/20 110/74   02/29/20 96/62   02/25/20 118/66     Last LDL:   Lab Results   Component Value Date    LDLCALC 21 01/27/2020    LDLDIRECT 187 (H) 08/01/2017     Aspirin Use: 81 mg    Tobacco/Alcohol History:    Smoking status: Current Every Day Smoker     Packs/day: 0.25     Years: 30.00     Pack years: 7.50     Types: Cigarettes    Smokeless tobacco: Never Used    Tobacco comment: 10 cigs per day    Alcohol use: No     Diabetes:   Saint Malone  was diagnosed with diabetes type 2 in : 15 years ago, screened and BG = 400   On insulin: > 3 years   Patient reports that diabetes is generally not well controlled.  Disease course has been variable    Current microvascular complications include peripheral neuropathy, blurry vision,    Current Macrovascular complications include 3 MIs, 6 stents placed ( last one in 3 years ago) . Has a Cardio MEMS implant Pumonary Artery HTN. . Monitored per cardiology   Patient reports compliance for about 80% of the time and adheres to medication, but usually not to diet and exercise instructions.  Admission for DKA in 6/2019 @ .  And 7/72019 for unresponsive with hyperglycemia of 562   Recent : cardiac catheterization , CABG on 1/27/2020     Other ED visit include for : none after DKA   PMH includes  Past Medical History:   Diagnosis Date    Anxiety and depression     Arthritis     CAD (coronary artery disease) 01/2018    Cardiomyopathy ([address]     CHF (congestive heart failure) ([address]     Chronic systolic heart failure ([address] 2/21/2020    COPD (chronic obstructive pulmonary disease) ([address]     Diabetes mellitus ([facility]     GERD (gastroesophageal reflux disease)     Hyperlipidemia     Hypertension     Hypotension     MI (myocardial infarction) ([facility]     Peripheral neuropathy     Peripheral vascular disease (HCC)     Pulmonary HTN (HCC)     Reflux     Sleep apnea     has never done sleep study;does not use CPAP    Wears glasses     for reading     Blood glucose trends:  Patient brought log glucometer for download: no  Test BG 4 times a day patient report  BG Range:   No fasting numbers as up through night , snacks through the night  Range: 70 to > HI ---> 100- 150  Middle of night hypoglycemia  Hypoglycemia awareness and symptoms: sluggish, confused  Completed CGM pro     Component    Latest Ref Rng & Units 10/21/2019   C-Peptide    1.1 - 4.4 ng/mL 8.5 (H)   Glutamic Acid Decarb Ab 0.0 - 5.0 IU/mL < 0.5   INSULIN A    0.0 - 0.4 U/mL < 0.4     Current Medication regimen:   Injects 5 times a day  Tresiba: 45 units--> 45 units  Humalo units AC TID--> ran out of humalog for > 1 week and did not call for refills---> rarely using this  Victoza: 1.8 mg    Previously patient was started on Tresiba however she did not realize she had to stop the [employer]. Has had some lows in 45s. Other meds tried in past: Metformin 1000 mg BID--> CKD3; Lantus: 35 units BID  GFR 57!--> 31!--> 38! Current Dietary regimen:   BF :  Skip, skips insulin--> eggs etc   Lunch: may skip, sw, eats out (kareem vazquez);  [employer] without bread  Dinner: left over, meats and vegetables  Snacks: sw : turkey etc/deli meat  Beverages: regular soda, approx 88 oz per day, water--> has quit her regular pop  Average carbs per day: has decreased to about 100/ day or less    Microvascular Complications:  · Neuropathy: tingling and numbness  · Retinopathy: no concerns with vision, field of vision  · Nephropathy: no recent MACR    Diabetic Health Maintenance   · Last Eye Exam: > 1 year ago, in 2019  · Last Foot exam:  Dr Haley Haley, DPM  · Has patient seen a dietitian? Yes  · Current Exercise: No structured exercise. · On ACEI or ARB: other antihypertensives used  · On statin: Lipitor 80 mg    Hyperlipidemia: Current complaints include occasional myalgias but otherwise tolerates well. Lab Results   Component Value Date    CHOL 58 01/27/2020    CHOL 158 01/11/2020    CHOL 178 10/21/2019     Lab Results   Component Value Date    TRIG 78 01/27/2020    TRIG 140 01/11/2020    TRIG 278 10/21/2019     Lab Results   Component Value Date    HDL 21 01/27/2020    HDL 27 01/11/2020    HDL 31 10/21/2019     Lab Results   Component Value Date    LDLCALC 21 01/27/2020    LDLCALC 103 01/11/2020    [address] 10/21/2019     Lab Results   Component Value Date    LDLDIRECT 187 08/01/2017     No results found for: CHOLHDLRATIO      Hypertension   Controlled , denies symptoms of dizziness, light headedness. Occasional dependent edema. Tries to follow a salt restricted diet. Lab Results   Component Value Date     (L) 02/29/2020    K 3.6 02/29/2020    CL 85 (L) 02/29/2020    CO2 27 02/29/2020    BUN 80 (H) 02/29/2020    CREATININE 1.7 (H) 02/29/2020    GLUCOSE 161 (H) 02/29/2020    CALCIUM 9.6 02/29/2020    PROT 8.8 (H) 02/28/2020    LABALBU 4.3 02/28/2020    BILITOT 0.5 02/28/2020    ALKPHOS 123 02/28/2020    AST 23 02/28/2020    ALT 14 02/28/2020    LABGLOM 31 (A) 02/29/2020    GFRAA 38 (A) 02/29/2020    AGRATIO 1.0 (L) 02/28/2020    GLOB 4.5 02/28/2020     The ASCVD Risk score (Wang Ivey., et al., 2013) failed to calculate for the following reasons: The valid total cholesterol range is 130 to 320 mg/dL    Family History   Problem Relation Age of Onset    Heart Disease Maternal Grandmother     Cancer Mother         lung and brain cancer    Cancer Maternal Aunt         lung and brain cancer     Review of Systems   Constitutional: Negative for activity change, appetite change, diaphoresis, fever and unexpected weight change. HENT: Negative for dental problem. Eyes: Negative for pain and visual disturbance. Respiratory: Negative for shortness of breath. Cardiovascular: Negative for chest pain, palpitations and leg swelling. Gastrointestinal: Negative for constipation, diarrhea and nausea. Endocrine: Negative for cold intolerance, heat intolerance, polydipsia, polyphagia and polyuria. Genitourinary: Negative for frequency and urgency. Musculoskeletal: Negative for arthralgias, joint swelling and myalgias. Skin: Negative for color change and pallor. Neurological: Negative for weakness, numbness and headaches. Psychiatric/Behavioral: Negative for dysphoric mood and sleep disturbance. The patient is not nervous/anxious. Vitals:    03/03/20 1402   BP: 110/74   Pulse: 90   Resp: 14   SpO2: 97%   Weight: 237 lb (107.5 kg)   Height: 5' 4\" (1.626 m)     Physical Exam  Vitals signs reviewed. Constitutional:       Appearance: She is well-developed. Eyes:      Pupils: Pupils are equal, round, and reactive to light. Neck:      Musculoskeletal: Normal range of motion. Thyroid: No thyromegaly. Cardiovascular:      Rate and Rhythm: Normal rate and regular rhythm. Heart sounds: Normal heart sounds. Pulmonary:      Effort: Pulmonary effort is normal.      Breath sounds: Normal breath sounds. Abdominal:      General: There is no distension. Musculoskeletal: Normal range of motion. Skin:     General: Skin is warm and dry. Comments: No skin changes or evidence of trauma. Neurological:      Mental Status: She is alert and oriented to person, place, and time. Sensory: No sensory deficit. Psychiatric:         Behavior: Behavior normal.         Thought Content: Thought content normal.       Skeletal foot exam:ulcer on right and left toes, managed per podiatry and dressings not removed per patient request.  Toenails are normal. Pulses are +2 DP bilaterally. Skeletal structures are intact upon visual examination. Sensory: 10 g monofilament is 3/10 on the right and 3/10 on the left, 128 Hz vibration sense is decreased bilaterally. Diabetes Continuous Glucose Monitoring Report      Reason for Study:   - Poorly controlled DM without success with standard interventions   - Glucose variability      Current Therapy:   Lantus: 35 units BID  Humalo units AC TID  Victoza 1.2 mg    CGMS Report   CGMS Device: Freestyle Yuliana Pro  Data collection from 19 to 19  Range set @   Average reading 196 mg/dL   Above target range 62%  In target range 38%  Below target range 0%      Impression:   Postprandial hyperglycemia   Persistent overnight hyperglycemia    Recommendation:   Current A1c   Lab Results   Component Value Date    LABA1C 6.1 2020     Goal A1c <6.5%  Medication Change: no longer on Metformin  Insulin Change: see titration protocol below  Dietary Recommendations : discussed decreasing the carb intake: averaging > 15- grams per day    Assessment  Norberto Shah is a 62 y.o. female with uncontrolled Diabetes Mellitus type 2 complicated by peripheral neuropathy, slow healing wounds, blurry vision, and microalbuminuria and assoc/iated with HTN, HLD, PAHTN, PVD, CAD, CLINT, obesity and periodontal disease    Plan  Problem List Items Addressed This Visit     Mixed hyperlipidemia (Chronic)     LDL goal  < 100; recent at 21 ? TG not elevated at 78  Continue current regimen; managed by PCP  discussed significantly reduced numbers post her hospital stay         Diabetes mellitus type 2 in [facility]) - Primary     Lab Results   Component Value Date    LABA1C 6.1 2020     At goal  Almost not needing humalog any more  Continue to titrate [place]  Continue on victoza at current dose  Discussed diet and hydration         Essential hypertension     Blood pressure controlled.  Continue current regimen  Recent CABG x 2           Greater than 40 minutes spent directly counseling patient about topics listed above (such as lifestyle modifications, preventative screenings and/or disease related processes. Return in about 3 months (around 6/3/2020).    Form for disability scanned in chart: completed

## 2020-03-03 NOTE — PROGRESS NOTES
cyanosis of the extremities.   · 2-3+ edema  · Pedal Pulses: 2+ and equal   Abdomen:  · No masses or tenderness  · Liver/Spleen: No Abnormalities Noted  Neurological/Psychiatric:  · Alert and oriented in all spheres  · Moves all extremities well  · Exhibits normal gait balance and coordination  · No abnormalities of mood, affect, memory, mentation, or behavior are noted    Lab Data:  Most recent lab results below reviewed in office    CBC:   Lab Results   Component Value Date    WBC 12.3 02/29/2020    WBC 17.2 02/28/2020    WBC 14.6 01/31/2020    RBC 3.77 02/29/2020    RBC 4.28 02/28/2020    RBC 3.11 01/31/2020    HGB 10.2 02/29/2020    HGB 11.6 02/28/2020    HGB 8.7 01/31/2020    HCT 31.7 02/29/2020    HCT 36.4 02/28/2020    HCT 27.4 01/31/2020    MCV 84.0 02/29/2020    MCV 85.1 02/28/2020    MCV 88.1 01/31/2020    RDW 17.7 02/29/2020    RDW 17.6 02/28/2020    RDW 16.5 01/31/2020     02/29/2020     02/28/2020     01/31/2020     BMP:  Lab Results   Component Value Date     02/29/2020     02/29/2020     02/28/2020    K 3.6 02/29/2020    K 2.9 02/29/2020    K 3.0 02/28/2020    K 3.1 02/24/2020    K 4.2 01/27/2020    K 3.8 01/26/2020    CL 85 02/29/2020    CL 85 02/29/2020    CL 86 02/28/2020    CO2 27 02/29/2020    CO2 31 02/29/2020    CO2 28 02/28/2020    PHOS 3.9 02/13/2020    PHOS 3.9 10/08/2019    PHOS 3.8 09/30/2019    BUN 80 02/29/2020    BUN 79 02/29/2020    BUN 88 02/28/2020    CREATININE 1.7 02/29/2020    CREATININE 1.6 02/29/2020    CREATININE 1.9 02/28/2020     BNP:   Lab Results   Component Value Date    PROBNP 2,219 02/24/2020    PROBNP 4,640 02/20/2020    PROBNP 2,312 01/20/2020     LIPID:   Lab Results   Component Value Date    TRIG 78 01/27/2020    TRIG 140 01/11/2020    HDL 21 01/27/2020    HDL 27 01/11/2020    LDLCALC 21 01/27/2020    LDLCALC 103 01/11/2020    LDLDIRECT 187 08/01/2017       Cardiac Imaging:  Surgery: 1/27/20 Urgent coronary artery bypass grafting surgery x1 with pedicled left internal mammary artery to the LAD. Cardiopulmonary bypass with on-pump beating-heart technique, no cross-clamp. Transesophageal echo. Epiaortic ultrasound. [place] catheter placement. Doppler verification of grafts. Bilateral five-level intercostal nerve block with Exparel. Platelet gel application. Sternal plating. Vas-Cath placement. CARDIAC CATH 1/20/2020:   Left Heart Cath  Dominance : Right     LM: 70-80% short distal stenosis     LAD: 70-80% short ostial stenosis, extending into takeoff of high first diagonal; large proximal to mid stented segment patent with jailed second diagonal  (80% ostial D2) and 70% in stent restenosis of most distal stent; distal to near apical LAD with minimal disease     LCx: 70-80% short ostial stenosis, prior to patent proximal to mid stents      RCA: large, dominant vessel with long 60% proximal to mid stenosis      LVEDP: 4 mmHG  LVG not done due to CKD.     Impression/Recommendations:  Diabetic with previous LAD and LCx stenting in 2018 returns with unstable angina, in stent restenosis and Left Main bifurcation disease. Recommend CTS evaluation inpatient to discuss surgical revascularization option. Hold Clopidogrel. CT Abd/ Pelvis 12/25/19:   FINDINGS: Lower Chest: Lung bases are grossly clear. Small hiatal hernia. Left lower lobe calcified granuloma. Organs: Noncontrast appearance of the liver, spleen, adrenal glands, and pancreas unremarkable. Pneumobilia identified within liver may be related to previous cholecystectomy or other biliary procedure. Kidneys symmetric in size. Left renal hilar calcifications are favored to be vascular. GI/Bowel: Moderate to large amount retained stool throughout the colon. Appendix is unremarkable. No evidence of bowel obstruction. Pelvis: Bladder is mildly distended. Uterus unremarkable. No adnexal mass. Peritoneum/Retroperitoneum: Moderate severe aortic vascular calcifications.  Aorta is nonaneurysmal.  No free air or free fluid. Bones/Soft Tissues: Severe multilevel facet arthropathy     BLE ZACH's 4/18/19:   1. There is severe arterial insufficiency at rest involving the right lower    extremity. There occlusive disease of the right proximal superficial femoral    (noted stent) and mid posterior tibial arteries. There is a 50-74% stenosis    at 4the right deep femoral artery with evidence of inflow disease.    2. There is severe arterial insufficiency at rest involving the left lower    extremity. There is occlusive disease of the left proximal superficial    femoral artery (noted stent). There is a 30-49% stenosis at the left deep    femoral artery with evidence of inflow disease.         ECHO 4/3/19:   Neno Ludt systolic function is mildly reduced with EF estimated at 40-45%.   There is severe HK of the apex and apical-septal segment.   Normal left ventricular diastolic filling pressure.   Mild mitral regurgitation.   Systolic pulmonary artery pressure (SPAP) estimated at 32mmHg (RA pressure 3mmHg). Arterial doppler studies 8/2/18:   Jeralene Centers is no arterial insufficiency at rest involving the lower extremities    bilaterally, however, there is evidence to suggest calf vessel disease    bilaterally. [facility] 3/26/18 [employer] promus premier [id number] CCx and [id number] CCx. Echo: 5/23/18:    Ejection fraction is visually estimated to be 30-35 %. There is akinesis of the apex and inferior walls. Left ventricular size is mildly increased. Moderate mitral regurgitation. Moderate amount plaque is noted in the aorta. The left atrium is mildly dilated. 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 A bubble study was performed and fails to show evidence of shunting.     Procedure performed: Transesophageal echocardiogram 5/25/18:    Findings:  Reduced left ventricular function with ejection fraction estimated at about 35% with apical and inferior akinesis    The cardiac valves show moderate mitral regurgitation  There is no evidence for an intracardiac shunt or intracardiac thrombus. Cardiac cath 5/25/18:   Procedure Performed:  1. Coronary angiogram  2. Left heart cath  3. Left ventriculogram  4. Right heart cath   Findings:  1. Patent LAD and CIRC stents              ~mild CAD  2. Reduced LVEF 30% with anterior and apical akinesis  3. Moderate pulmonary hyperension   Conclusion/plan of care:  1. Medical management    Assessment:    1. Coronary artery disease involving native coronary artery of native heart without angina pectoris    2. Ischemic cardiomyopathy    3. Chronic systolic congestive heart failure ([address]    4. Essential hypertension    5. Mixed hyperlipidemia    6. Mitral valve insufficiency, unspecified etiology    7. PVD (peripheral vascular disease) with claudication ([address]    8. Type 2 diabetes mellitus with stage 3 chronic kidney disease, with long-term current use of insulin ([facility]    9. CLINT (obstructive sleep apnea)    10. CKD (chronic kidney disease) stage 3, GFR 30-59 ml/min ([facility])    11. Tobacco smoker      Seen by Dr. Any Currie in office for today with concerns of drainage from sternal wound. Wound evaluated, cultured, cleansed and dressed. He recommended Bactrim X5 days and will see next Tuesday. (I placed orders for him under my name, will forward results). Plan:   1. Stop the metoprolol completely (hypotension)  2. Decrease the torsemide to 100 mg once daily (higher dose does not seem to be helping)  3. Will fax orders for IV Lasix. 4. Bactrim antibiotic per Dr. Liz Green  5. Follow up with me as scheduled  6. Will be touching base after blood test results      I appreciate the opportunity of cooperating in the care of this individual.    TRAY Phillips Arm - CNP, 3/4/2020, 2:46 PM       QUALITY MEASURES  1. Tobacco Cessation Counseling: NA  2. Retake of BP if >140/90:   NA  3.  Documentation to PCP/referring for new patient:  Sent to PCP

## 2020-03-03 NOTE — ASSESSMENT & PLAN NOTE
LDL goal  < 100; recent at 21 ?   TG not elevated at 78  Continue current regimen; managed by PCP  discussed significantly reduced numbers post her hospital stay

## 2020-03-04 ENCOUNTER — OFFICE VISIT (OUTPATIENT)
Dept: CARDIOLOGY CLINIC | Age: 55
End: 2020-03-04
Payer: COMMERCIAL

## 2020-03-04 ENCOUNTER — HOSPITAL ENCOUNTER (OUTPATIENT)
Age: 55
Discharge: HOME | End: 2020-03-04
Payer: COMMERCIAL

## 2020-03-04 VITALS
DIASTOLIC BLOOD PRESSURE: 50 MMHG | OXYGEN SATURATION: 96 % | BODY MASS INDEX: 40.63 KG/M2 | SYSTOLIC BLOOD PRESSURE: 78 MMHG | HEIGHT: 64 IN | WEIGHT: 238 LBS | HEART RATE: 84 BPM

## 2020-03-04 PROCEDURE — 2022F DILAT RTA XM EVC RTNOPTHY: CPT | Performed by: NURSE PRACTITIONER

## 2020-03-04 PROCEDURE — 87077 CULTURE AEROBIC IDENTIFY: CPT

## 2020-03-04 PROCEDURE — G8427 DOCREV CUR MEDS BY ELIG CLIN: HCPCS | Performed by: NURSE PRACTITIONER

## 2020-03-04 PROCEDURE — 3017F COLORECTAL CA SCREEN DOC REV: CPT | Performed by: NURSE PRACTITIONER

## 2020-03-04 PROCEDURE — 3044F HG A1C LEVEL LT 7.0%: CPT | Performed by: NURSE PRACTITIONER

## 2020-03-04 PROCEDURE — 87070 CULTURE OTHR SPECIMN AEROBIC: CPT

## 2020-03-04 PROCEDURE — 87205 SMEAR GRAM STAIN: CPT

## 2020-03-04 PROCEDURE — 87075 CULTR BACTERIA EXCEPT BLOOD: CPT

## 2020-03-04 PROCEDURE — 99214 OFFICE O/P EST MOD 30 MIN: CPT | Performed by: NURSE PRACTITIONER

## 2020-03-04 PROCEDURE — G8482 FLU IMMUNIZE ORDER/ADMIN: HCPCS | Performed by: NURSE PRACTITIONER

## 2020-03-04 PROCEDURE — G8417 CALC BMI ABV UP PARAM F/U: HCPCS | Performed by: NURSE PRACTITIONER

## 2020-03-04 PROCEDURE — 1036F TOBACCO NON-USER: CPT | Performed by: NURSE PRACTITIONER

## 2020-03-04 RX ORDER — TORSEMIDE 100 MG/1
100 TABLET ORAL DAILY
Qty: 30 TABLET | Refills: 1
Start: 2020-03-04 | End: 2020-04-13

## 2020-03-04 RX ORDER — SULFAMETHOXAZOLE AND TRIMETHOPRIM 800; 160 MG/1; MG/1
1 TABLET ORAL 2 TIMES DAILY
Qty: 10 TABLET | Refills: 0 | Status: SHIPPED | OUTPATIENT
Start: 2020-03-04 | End: 2020-03-09

## 2020-03-04 ASSESSMENT — ENCOUNTER SYMPTOMS
SHORTNESS OF BREATH: 1
COUGH: 0
NAUSEA: 1

## 2020-03-04 NOTE — LETTER
[address]   Cardiac Evaluation    Primary Care Doctor:  Eleazar Ceron [place]    Chief Complaint   Patient presents with    3 Month Follow-Up    Fatigue    Shortness of Breath    Edema    Other     soreness where surgery was done for open heart        History of Present Illness:   I had the pleasure of seeing Gabrielle Bernabe in follow up for CAD s/p prior PCI's most recently underwent CABG X 1 on 1/27/2020, ICM, sCHF, HTN, HLD as well as DM, PAD, CKD, CLINT on CPAP and smoker. She underwent placement of CardioMEMS on 2/14/19. She has not been able to transmit CardioMEMs as has not been able to lie flat. She was having increased leg edema despite increase in torsemide to 100 mg bid. We then arranged for her to receive IV Lasix 80 mg on 2/24/20 with improvement in weight and edema. She was then seen in ED for N/V/D, received IV fluids for azotemia. She still has left foot/ 2nd toe ulcer which looks a bit better but still bad despite 10 days of cephalexin (per PCP). She also has open wounds and drainage to sternal incision. She has moved again, does not have scale so is not weighing herself. She reports did well with the IV Lasix with drop in weight to 225 lbs at home, improvement in breathing and swelling. No fever or chills but stays cold all the time. She is eating more due to good appetite as well as depression and boredom. Her A1c was down to 6.1 yesterday. She is having peripheral angiogram procedure with Dr. Dagoberto Montiel on 3/10/20. Potassium still runs low (2.9 to 3.6) with taking KCL 40 mg bid. BP running low today, denies lightheadedness but just feels \"blah\". Gabrielle Bernabe describes symptoms including dyspnea, orthopnea, fatigue, edema but denies chest pain, palpitations, PND, early saiety, syncope.      NYHA:   III  ACC/ AHA Stage:    C    Past Medical History:   has a past medical history of Anxiety and depression, Arthritis, CAD (coronary artery disease), Cardiomyopathy ([facility] [address] CHF (congestive heart failure) ([facility] [address] Chronic systolic heart failure ([facility] [address] COPD (chronic obstructive pulmonary disease) ([facility] [address] Diabetes mellitus ([facility] [address] GERD (gastroesophageal reflux disease), Hyperlipidemia, Hypertension, Hypotension, MI (myocardial infarction) ([facility] [address] Peripheral neuropathy, Peripheral vascular disease ([facility] [address] Pulmonary HTN ([facility] [address] Reflux, Sleep apnea, and Wears glasses. Surgical History:   has a past surgical history that includes Tonsillectomy; Cholecystectomy; tumor excision; Upper gastrointestinal endoscopy (4/25/2013); Upper gastrointestinal endoscopy (12/10/2015); Upper gastrointestinal endoscopy (01/06/2017); Arterial bypass surgry (Left, 01/06/2016); Cardiac catheterization (03/27/2018); Coronary angioplasty with stent (03/16/2018); Rotator cuff repair (Left, 04/22/2016); Coronary angioplasty with stent (01/03/2018); Insertable Cardiac Monitor (02/14/2019); femoral bypass (Right, 5/16/2019); transluminal angioplasty (Bilateral, 10/08/2019); Cardiac catheterization (01/20/2020); and Coronary artery bypass graft (N/A, 1/27/2020). Social History:   reports that she quit smoking about 7 months ago. Her smoking use included cigarettes. She has a 7.50 pack-year smoking history. She has never used smokeless tobacco. She reports that she does not drink alcohol or use drugs. Family History:   Family History   Problem Relation Age of Onset    Heart Disease Maternal Grandmother     Cancer Mother         lung and brain cancer    Cancer Maternal Aunt         lung and brain cancer       Home Medications:  Prior to Admission medications    Medication Sig Start Date End Date Taking?  Authorizing Provider   metoprolol tartrate (LOPRESSOR) 25 MG tablet Take 0.5 tablets by mouth 2 times daily 3/2/20  Yes TRAY Alvarado CNP   atorvastatin (LIPITOR) 80 MG tablet Take 1 tablet by mouth nightly 2/28/20  Yes TRAY Alvarado CNP · Regular rate and rhythm, normal S1S2, no m/g/r  · Peripheral pulses are symmetrical and full  · There is no clubbing, cyanosis of the extremities.   · 2-3+ edema  · Pedal Pulses: 2+ and equal   Abdomen:  · No masses or tenderness  · Liver/Spleen: No Abnormalities Noted  Neurological/Psychiatric:  · Alert and oriented in all spheres  · Moves all extremities well  · Exhibits normal gait balance and coordination  · No abnormalities of mood, affect, memory, mentation, or behavior are noted    Lab Data:  Most recent lab results below reviewed in office    CBC:   Lab Results   Component Value Date    WBC 12.3 02/29/2020    WBC 17.2 02/28/2020    WBC 14.6 01/31/2020    RBC 3.77 02/29/2020    RBC 4.28 02/28/2020    RBC 3.11 01/31/2020    HGB 10.2 02/29/2020    HGB 11.6 02/28/2020    HGB 8.7 01/31/2020    HCT 31.7 02/29/2020    HCT 36.4 02/28/2020    HCT 27.4 01/31/2020    MCV 84.0 02/29/2020    MCV 85.1 02/28/2020    MCV 88.1 01/31/2020    RDW 17.7 02/29/2020    RDW 17.6 02/28/2020    RDW 16.5 01/31/2020     02/29/2020     02/28/2020     01/31/2020     BMP:  Lab Results   Component Value Date     02/29/2020     02/29/2020     02/28/2020    K 3.6 02/29/2020    K 2.9 02/29/2020    K 3.0 02/28/2020    K 3.1 02/24/2020    K 4.2 01/27/2020    K 3.8 01/26/2020    CL 85 02/29/2020    CL 85 02/29/2020    CL 86 02/28/2020    CO2 27 02/29/2020    CO2 31 02/29/2020    CO2 28 02/28/2020    PHOS 3.9 02/13/2020    PHOS 3.9 10/08/2019    PHOS 3.8 09/30/2019    BUN 80 02/29/2020    BUN 79 02/29/2020    BUN 88 02/28/2020    CREATININE 1.7 02/29/2020    CREATININE 1.6 02/29/2020    CREATININE 1.9 02/28/2020     BNP:   Lab Results   Component Value Date    PROBNP 2,219 02/24/2020    PROBNP 4,640 02/20/2020    PROBNP 2,312 01/20/2020     LIPID:   Lab Results   Component Value Date    TRIG 78 01/27/2020    TRIG 140 01/11/2020    HDL 21 01/27/2020    HDL 27 01/11/2020    LDLCALC 21 01/27/2020 [address] 103 01/11/2020    LDLDIRECT 187 08/01/2017       Cardiac Imaging:  Surgery: 1/27/20 Urgent coronary artery bypass grafting surgery x1 with pedicled left internal mammary artery to the LAD. Cardiopulmonary bypass with on-pump beating-heart technique, no cross-clamp. Transesophageal echo. Epiaortic ultrasound. Allyn-Jurgen catheter placement. Doppler verification of grafts. Bilateral five-level intercostal nerve block with Exparel. Platelet gel application. Sternal plating. Vas-Cath placement. CARDIAC CATH 1/20/2020:   Left Heart Cath  Dominance : Right     LM: 70-80% short distal stenosis     LAD: 70-80% short ostial stenosis, extending into takeoff of high first diagonal; large proximal to mid stented segment patent with jailed second diagonal  (80% ostial D2) and 70% in stent restenosis of most distal stent; distal to near apical LAD with minimal disease     LCx: 70-80% short ostial stenosis, prior to patent proximal to mid stents      RCA: large, dominant vessel with long 60% proximal to mid stenosis      LVEDP: 4 mmHG  LVG not done due to CKD.     Impression/Recommendations:  Diabetic with previous LAD and LCx stenting in 2018 returns with unstable angina, in stent restenosis and Left Main bifurcation disease. Recommend CTS evaluation inpatient to discuss surgical revascularization option. Hold Clopidogrel. CT Abd/ Pelvis 12/25/19:   FINDINGS: Lower Chest: Lung bases are grossly clear. Small hiatal hernia. Left lower lobe calcified granuloma. Organs: Noncontrast appearance of the liver, spleen, adrenal glands, and pancreas unremarkable. Pneumobilia identified within liver may be related to previous cholecystectomy or other biliary procedure. Kidneys symmetric in size. Left renal hilar calcifications are favored to be vascular. GI/Bowel: Moderate to large amount retained stool throughout the colon. Appendix is unremarkable.   No evidence of bowel obstruction. Pelvis: Bladder is mildly distended. Uterus unremarkable. No adnexal mass. Peritoneum/Retroperitoneum: Moderate severe aortic vascular calcifications. Aorta is nonaneurysmal.  No free air or free fluid. Bones/Soft Tissues: Severe multilevel facet arthropathy     BLE ZACH's 4/18/19:   1. There is severe arterial insufficiency at rest involving the right lower    extremity. There occlusive disease of the right proximal superficial femoral    (noted stent) and mid posterior tibial arteries. There is a 50-74% stenosis    at 4the right deep femoral artery with evidence of inflow disease.    2. There is severe arterial insufficiency at rest involving the left lower    extremity. There is occlusive disease of the left proximal superficial    femoral artery (noted stent). There is a 30-49% stenosis at the left deep    femoral artery with evidence of inflow disease.         ECHO 4/3/19:   Juan Luis Mckenzie systolic function is mildly reduced with EF estimated at 40-45%.   There is severe HK of the apex and apical-septal segment.   Normal left ventricular diastolic filling pressure.   Mild mitral regurgitation.   Systolic pulmonary artery pressure (SPAP) estimated at 32mmHg (RA pressure 3mmHg). Arterial doppler studies 8/2/18:   Cooper Hoose is no arterial insufficiency at rest involving the lower extremities    bilaterally, however, there is evidence to suggest calf vessel disease    bilaterally. [facility] 3/26/18 [employer] promus premier [id number] CCx and [id number] CCx. Echo: 5/23/18:    Ejection fraction is visually estimated to be 30-35 %. There is akinesis of the apex and inferior walls. Left ventricular size is mildly increased. Moderate mitral regurgitation. Moderate amount plaque is noted in the aorta. The left atrium is mildly dilated. 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 A bubble study was performed and fails to show evidence of shunting.

## 2020-03-05 ENCOUNTER — HOSPITAL ENCOUNTER (OUTPATIENT)
Dept: NURSING | Age: 55
Setting detail: INFUSION SERIES
Discharge: HOME | End: 2020-03-05
Payer: COMMERCIAL

## 2020-03-05 VITALS
HEART RATE: 83 BPM | SYSTOLIC BLOOD PRESSURE: 91 MMHG | OXYGEN SATURATION: 97 % | HEIGHT: 64 IN | DIASTOLIC BLOOD PRESSURE: 63 MMHG | BODY MASS INDEX: 40.63 KG/M2 | RESPIRATION RATE: 16 BRPM | WEIGHT: 238 LBS

## 2020-03-05 DIAGNOSIS — E66.9 DIABETES MELLITUS TYPE 2 IN OBESE (HCC): ICD-10-CM

## 2020-03-05 DIAGNOSIS — I50.22 CHRONIC SYSTOLIC HEART FAILURE (HCC): ICD-10-CM

## 2020-03-05 DIAGNOSIS — I25.10 CORONARY ARTERY DISEASE WITHOUT ANGINA PECTORIS, UNSPECIFIED VESSEL OR LESION TYPE, UNSPECIFIED WHETHER NATIVE OR TRANSPLANTED HEART: Primary | ICD-10-CM

## 2020-03-05 DIAGNOSIS — E11.69 DIABETES MELLITUS TYPE 2 IN OBESE (HCC): ICD-10-CM

## 2020-03-05 DIAGNOSIS — I50.9 ACUTE CONGESTIVE HEART FAILURE, UNSPECIFIED HEART FAILURE TYPE (HCC): ICD-10-CM

## 2020-03-05 DIAGNOSIS — N18.30 CKD (CHRONIC KIDNEY DISEASE) STAGE 3, GFR 30-59 ML/MIN (HCC): ICD-10-CM

## 2020-03-05 LAB
ANION GAP SERPL CALCULATED.3IONS-SCNC: 16 MMOL/L (ref 3–16)
BUN BLDV-MCNC: 97 MG/DL (ref 7–20)
CALCIUM SERPL-MCNC: 9.4 MG/DL (ref 8.3–10.6)
CHLORIDE BLD-SCNC: 83 MMOL/L (ref 99–110)
CO2: 31 MMOL/L (ref 21–32)
CREAT SERPL-MCNC: 1.8 MG/DL (ref 0.6–1.1)
GFR AFRICAN AMERICAN: 35
GFR NON-AFRICAN AMERICAN: 29
GLUCOSE BLD-MCNC: 115 MG/DL (ref 70–99)
HCT VFR BLD CALC: 32.1 % (ref 36–48)
HEMOGLOBIN: 10.4 G/DL (ref 12–16)
MCH RBC QN AUTO: 27.4 PG (ref 26–34)
MCHC RBC AUTO-ENTMCNC: 32.3 G/DL (ref 31–36)
MCV RBC AUTO: 84.9 FL (ref 80–100)
PDW BLD-RTO: 17.6 % (ref 12.4–15.4)
PLATELET # BLD: 367 K/UL (ref 135–450)
PMV BLD AUTO: 8.3 FL (ref 5–10.5)
POTASSIUM SERPL-SCNC: 2.8 MMOL/L (ref 3.5–5.1)
PRO-BNP: 1963 PG/ML (ref 0–124)
RBC # BLD: 3.78 M/UL (ref 4–5.2)
SODIUM BLD-SCNC: 130 MMOL/L (ref 136–145)
WBC # BLD: 7.8 K/UL (ref 4–11)

## 2020-03-05 PROCEDURE — 6360000002 HC RX W HCPCS: Performed by: NURSE PRACTITIONER

## 2020-03-05 PROCEDURE — 2580000003 HC RX 258: Performed by: NURSE PRACTITIONER

## 2020-03-05 PROCEDURE — 96365 THER/PROPH/DIAG IV INF INIT: CPT

## 2020-03-05 PROCEDURE — 85027 COMPLETE CBC AUTOMATED: CPT

## 2020-03-05 PROCEDURE — 6370000000 HC RX 637 (ALT 250 FOR IP): Performed by: NURSE PRACTITIONER

## 2020-03-05 PROCEDURE — 80048 BASIC METABOLIC PNL TOTAL CA: CPT

## 2020-03-05 PROCEDURE — 83880 ASSAY OF NATRIURETIC PEPTIDE: CPT

## 2020-03-05 PROCEDURE — 99211 OFF/OP EST MAY X REQ PHY/QHP: CPT

## 2020-03-05 RX ORDER — FUROSEMIDE 10 MG/ML
80 INJECTION INTRAMUSCULAR; INTRAVENOUS ONCE
Status: DISCONTINUED | OUTPATIENT
Start: 2020-03-05 | End: 2020-03-05 | Stop reason: SDUPTHER

## 2020-03-05 RX ORDER — FUROSEMIDE 10 MG/ML
80 INJECTION INTRAMUSCULAR; INTRAVENOUS ONCE
Status: CANCELLED
Start: 2020-03-05

## 2020-03-05 RX ORDER — POTASSIUM CHLORIDE 20 MEQ/1
40 TABLET, EXTENDED RELEASE ORAL ONCE
Status: COMPLETED | OUTPATIENT
Start: 2020-03-05 | End: 2020-03-05

## 2020-03-05 RX ADMIN — POTASSIUM CHLORIDE 40 MEQ: 20 TABLET, EXTENDED RELEASE ORAL at 13:38

## 2020-03-05 RX ADMIN — FUROSEMIDE: 10 INJECTION, SOLUTION INTRAVENOUS at 12:14

## 2020-03-05 ASSESSMENT — PAIN SCALES - GENERAL: PAINLEVEL_OUTOF10: 0

## 2020-03-05 NOTE — PROGRESS NOTES
Spoke with Anup FELIZ regarding pt's lab work and KCL level of 2.8 today Orders were received for 40 meq KCL orally today before pt is  discharge  Gi Munoz will call pt later today and review lab results and further orders with her at that time  Pt was informed and is receptive to plan

## 2020-03-05 NOTE — PROGRESS NOTES
Call received from Lab stating that pt's potassium level is 2.8  Call placed to [facility]  Lasix has infused and tubing is flushing with NS  pt was informed of today's potassium level and that I am waiting for a return call for orders  Pt receptive and stated that she has only taken 1 of her 4 doses of potassium today  Pt is taking 40 meq of potassium QID

## 2020-03-05 NOTE — PROGRESS NOTES
IV was flushed with NS then IV was removed without difficulty  Cath tip intact  Pressure then pressure dressing applied and secured with a coban dressing  IV site unremarkable  Pt rose mary well   Pt was up to BR  Gait steady   Potassium 40 meq given orally after pt had a couple of crackers  Pt rose mary well  Discharge instructions reviewed with pt and copy was given  Pt verbalized understanding that she needs to take 40 meq of potassium 3 more times today and she is aware the office will call with further instructions  Pt was then discharged ambulatory in stable condition

## 2020-03-05 NOTE — PROGRESS NOTES
Pt here ambulatory for a Lasix injection  Pt reports she has been very tired, weak and has gained some water weight over the past day or so  Pt takes Lasix at home and denies need for a print out or any information about the lasix  IV # 22 started on 1st attempt to left hand  Blood for lab work drawn  Pt rose mary well  Lasiz 80 mg was started via IVPB without difficulty  Lasix bag was covered during infusion  Pt rose mary well  Will monitor

## 2020-03-09 ENCOUNTER — TELEPHONE (OUTPATIENT)
Dept: VASCULAR SURGERY | Age: 55
End: 2020-03-09

## 2020-03-09 LAB
ANAEROBIC CULTURE: ABNORMAL
GRAM STAIN RESULT: ABNORMAL
ORGANISM: ABNORMAL
WOUND/ABSCESS: ABNORMAL

## 2020-03-10 ENCOUNTER — APPOINTMENT (OUTPATIENT)
Dept: ULTRASOUND IMAGING | Age: 55
DRG: 469 | End: 2020-03-10
Attending: SURGERY
Payer: COMMERCIAL

## 2020-03-10 ENCOUNTER — HOSPITAL ENCOUNTER (INPATIENT)
Dept: CARDIAC CATH/INVASIVE PROCEDURES | Age: 55
LOS: 3 days | Discharge: HOME | DRG: 469 | End: 2020-03-13
Attending: SURGERY | Admitting: INTERNAL MEDICINE
Payer: COMMERCIAL

## 2020-03-10 LAB
A/G RATIO: 0.9 (ref 1.1–2.2)
ALBUMIN SERPL-MCNC: 3.8 G/DL (ref 3.4–5)
ALP BLD-CCNC: 99 U/L (ref 40–129)
ALT SERPL-CCNC: 10 U/L (ref 10–40)
ANION GAP SERPL CALCULATED.3IONS-SCNC: 18 MMOL/L (ref 3–16)
AST SERPL-CCNC: 38 U/L (ref 15–37)
BILIRUB SERPL-MCNC: <0.2 MG/DL (ref 0–1)
BILIRUBIN URINE: NEGATIVE
BLOOD, URINE: NEGATIVE
BUN BLDV-MCNC: 101 MG/DL (ref 7–20)
CALCIUM SERPL-MCNC: 9.7 MG/DL (ref 8.3–10.6)
CHLORIDE BLD-SCNC: 86 MMOL/L (ref 99–110)
CLARITY: CLEAR
CO2: 27 MMOL/L (ref 21–32)
COLOR: NORMAL
CREAT SERPL-MCNC: 3.4 MG/DL (ref 0.6–1.1)
CREATININE URINE: 41 MG/DL (ref 28–259)
GFR AFRICAN AMERICAN: 17
GFR NON-AFRICAN AMERICAN: 14
GLOBULIN: 4.3 G/DL
GLUCOSE BLD-MCNC: 211 MG/DL (ref 70–99)
GLUCOSE BLD-MCNC: 257 MG/DL (ref 70–99)
GLUCOSE BLD-MCNC: 69 MG/DL (ref 70–99)
GLUCOSE BLD-MCNC: 69 MG/DL (ref 70–99)
GLUCOSE BLD-MCNC: 75 MG/DL (ref 70–99)
GLUCOSE BLD-MCNC: 93 MG/DL (ref 70–99)
GLUCOSE URINE: NEGATIVE MG/DL
HCT VFR BLD CALC: 33.1 % (ref 36–48)
HEMOGLOBIN: 10.9 G/DL (ref 12–16)
INR BLD: 1.09 (ref 0.86–1.14)
KETONES, URINE: NEGATIVE MG/DL
LEUKOCYTE ESTERASE, URINE: NEGATIVE
MCH RBC QN AUTO: 28.6 PG (ref 26–34)
MCHC RBC AUTO-ENTMCNC: 33.1 G/DL (ref 31–36)
MCV RBC AUTO: 86.5 FL (ref 80–100)
MICROSCOPIC EXAMINATION: NORMAL
NITRITE, URINE: NEGATIVE
PDW BLD-RTO: 17.8 % (ref 12.4–15.4)
PERFORMED ON: ABNORMAL
PERFORMED ON: NORMAL
PH UA: 6.5 (ref 5–8)
PLATELET # BLD: 342 K/UL (ref 135–450)
PMV BLD AUTO: 8.4 FL (ref 5–10.5)
POTASSIUM SERPL-SCNC: 4.8 MMOL/L (ref 3.5–5.1)
PROTEIN UA: NEGATIVE MG/DL
PROTHROMBIN TIME: 12.7 SEC (ref 10–13.2)
RBC # BLD: 3.83 M/UL (ref 4–5.2)
SODIUM BLD-SCNC: 131 MMOL/L (ref 136–145)
SODIUM URINE: 57 MMOL/L
SPECIFIC GRAVITY UA: 1.01 (ref 1–1.03)
TOTAL PROTEIN: 8.1 G/DL (ref 6.4–8.2)
UREA NITROGEN, UR: 483.8 MG/DL (ref 800–1666)
URINE TYPE: NORMAL
UROBILINOGEN, URINE: 0.2 E.U./DL
WBC # BLD: 9.2 K/UL (ref 4–11)

## 2020-03-10 PROCEDURE — 6370000000 HC RX 637 (ALT 250 FOR IP): Performed by: INTERNAL MEDICINE

## 2020-03-10 PROCEDURE — 81003 URINALYSIS AUTO W/O SCOPE: CPT

## 2020-03-10 PROCEDURE — 1200000000 HC SEMI PRIVATE

## 2020-03-10 PROCEDURE — 85027 COMPLETE CBC AUTOMATED: CPT

## 2020-03-10 PROCEDURE — 82570 ASSAY OF URINE CREATININE: CPT

## 2020-03-10 PROCEDURE — 76770 US EXAM ABDO BACK WALL COMP: CPT

## 2020-03-10 PROCEDURE — 84300 ASSAY OF URINE SODIUM: CPT

## 2020-03-10 PROCEDURE — 85610 PROTHROMBIN TIME: CPT

## 2020-03-10 PROCEDURE — 84540 ASSAY OF URINE/UREA-N: CPT

## 2020-03-10 PROCEDURE — 2580000003 HC RX 258: Performed by: INTERNAL MEDICINE

## 2020-03-10 PROCEDURE — 80053 COMPREHEN METABOLIC PANEL: CPT

## 2020-03-10 RX ORDER — ACETAMINOPHEN 650 MG/1
650 SUPPOSITORY RECTAL EVERY 6 HOURS PRN
Status: DISCONTINUED | OUTPATIENT
Start: 2020-03-10 | End: 2020-03-13 | Stop reason: HOSPADM

## 2020-03-10 RX ORDER — PANTOPRAZOLE SODIUM 40 MG/1
40 TABLET, DELAYED RELEASE ORAL 2 TIMES DAILY
Status: DISCONTINUED | OUTPATIENT
Start: 2020-03-10 | End: 2020-03-13 | Stop reason: HOSPADM

## 2020-03-10 RX ORDER — SODIUM CHLORIDE 9 MG/ML
INJECTION, SOLUTION INTRAVENOUS ONCE
Status: DISCONTINUED | OUTPATIENT
Start: 2020-03-10 | End: 2020-03-13 | Stop reason: HOSPADM

## 2020-03-10 RX ORDER — DEXTROSE MONOHYDRATE 50 MG/ML
100 INJECTION, SOLUTION INTRAVENOUS PRN
Status: DISCONTINUED | OUTPATIENT
Start: 2020-03-10 | End: 2020-03-13 | Stop reason: HOSPADM

## 2020-03-10 RX ORDER — BENZTROPINE MESYLATE 1 MG/1
1 TABLET ORAL NIGHTLY
Status: DISCONTINUED | OUTPATIENT
Start: 2020-03-10 | End: 2020-03-13 | Stop reason: HOSPADM

## 2020-03-10 RX ORDER — INSULIN GLARGINE 100 [IU]/ML
80 INJECTION, SOLUTION SUBCUTANEOUS NIGHTLY
Status: DISCONTINUED | OUTPATIENT
Start: 2020-03-10 | End: 2020-03-10

## 2020-03-10 RX ORDER — ONDANSETRON 2 MG/ML
4 INJECTION INTRAMUSCULAR; INTRAVENOUS EVERY 6 HOURS PRN
Status: DISCONTINUED | OUTPATIENT
Start: 2020-03-10 | End: 2020-03-13 | Stop reason: HOSPADM

## 2020-03-10 RX ORDER — ALBUTEROL SULFATE 90 UG/1
2 AEROSOL, METERED RESPIRATORY (INHALATION) EVERY 6 HOURS PRN
Status: DISCONTINUED | OUTPATIENT
Start: 2020-03-10 | End: 2020-03-13 | Stop reason: HOSPADM

## 2020-03-10 RX ORDER — SODIUM CHLORIDE 9 MG/ML
INJECTION, SOLUTION INTRAVENOUS ONCE
Status: DISCONTINUED | OUTPATIENT
Start: 2020-03-10 | End: 2020-03-10

## 2020-03-10 RX ORDER — SODIUM CHLORIDE 9 MG/ML
INJECTION, SOLUTION INTRAVENOUS CONTINUOUS
Status: DISCONTINUED | OUTPATIENT
Start: 2020-03-10 | End: 2020-03-11

## 2020-03-10 RX ORDER — ACETAMINOPHEN 325 MG/1
650 TABLET ORAL EVERY 6 HOURS PRN
Status: DISCONTINUED | OUTPATIENT
Start: 2020-03-10 | End: 2020-03-13 | Stop reason: HOSPADM

## 2020-03-10 RX ORDER — PROMETHAZINE HYDROCHLORIDE 25 MG/1
12.5 TABLET ORAL EVERY 6 HOURS PRN
Status: DISCONTINUED | OUTPATIENT
Start: 2020-03-10 | End: 2020-03-13 | Stop reason: HOSPADM

## 2020-03-10 RX ORDER — CLOPIDOGREL BISULFATE 75 MG/1
75 TABLET ORAL DAILY
Status: DISCONTINUED | OUTPATIENT
Start: 2020-03-11 | End: 2020-03-13 | Stop reason: HOSPADM

## 2020-03-10 RX ORDER — INSULIN GLARGINE 100 [IU]/ML
30 INJECTION, SOLUTION SUBCUTANEOUS NIGHTLY
Status: DISCONTINUED | OUTPATIENT
Start: 2020-03-10 | End: 2020-03-13 | Stop reason: HOSPADM

## 2020-03-10 RX ORDER — ASPIRIN 81 MG/1
81 TABLET ORAL DAILY
Status: DISCONTINUED | OUTPATIENT
Start: 2020-03-11 | End: 2020-03-13 | Stop reason: HOSPADM

## 2020-03-10 RX ORDER — SODIUM CHLORIDE 0.9 % (FLUSH) 0.9 %
10 SYRINGE (ML) INJECTION EVERY 12 HOURS SCHEDULED
Status: DISCONTINUED | OUTPATIENT
Start: 2020-03-10 | End: 2020-03-13 | Stop reason: HOSPADM

## 2020-03-10 RX ORDER — BUPRENORPHINE AND NALOXONE 8; 2 MG/1; MG/1
1 FILM, SOLUBLE BUCCAL; SUBLINGUAL 2 TIMES DAILY
Status: DISCONTINUED | OUTPATIENT
Start: 2020-03-10 | End: 2020-03-13 | Stop reason: HOSPADM

## 2020-03-10 RX ORDER — NICOTINE POLACRILEX 4 MG
15 LOZENGE BUCCAL PRN
Status: DISCONTINUED | OUTPATIENT
Start: 2020-03-10 | End: 2020-03-13 | Stop reason: HOSPADM

## 2020-03-10 RX ORDER — ATORVASTATIN CALCIUM 80 MG/1
80 TABLET, FILM COATED ORAL NIGHTLY
Status: DISCONTINUED | OUTPATIENT
Start: 2020-03-10 | End: 2020-03-13 | Stop reason: HOSPADM

## 2020-03-10 RX ORDER — SODIUM CHLORIDE 0.9 % (FLUSH) 0.9 %
10 SYRINGE (ML) INJECTION PRN
Status: DISCONTINUED | OUTPATIENT
Start: 2020-03-10 | End: 2020-03-13 | Stop reason: HOSPADM

## 2020-03-10 RX ORDER — DEXTROSE MONOHYDRATE 25 G/50ML
12.5 INJECTION, SOLUTION INTRAVENOUS PRN
Status: DISCONTINUED | OUTPATIENT
Start: 2020-03-10 | End: 2020-03-13 | Stop reason: HOSPADM

## 2020-03-10 RX ORDER — BUSPIRONE HYDROCHLORIDE 5 MG/1
30 TABLET ORAL 2 TIMES DAILY
Status: DISCONTINUED | OUTPATIENT
Start: 2020-03-10 | End: 2020-03-13 | Stop reason: HOSPADM

## 2020-03-10 RX ORDER — ARIPIPRAZOLE 10 MG/1
10 TABLET ORAL DAILY
Status: DISCONTINUED | OUTPATIENT
Start: 2020-03-11 | End: 2020-03-13 | Stop reason: HOSPADM

## 2020-03-10 RX ADMIN — Medication 10 ML: at 12:02

## 2020-03-10 RX ADMIN — PANTOPRAZOLE SODIUM 40 MG: 40 TABLET, DELAYED RELEASE ORAL at 22:16

## 2020-03-10 RX ADMIN — INSULIN LISPRO 15 UNITS: 100 INJECTION, SOLUTION INTRAVENOUS; SUBCUTANEOUS at 17:36

## 2020-03-10 RX ADMIN — BENZTROPINE MESYLATE 1 MG: 1 TABLET ORAL at 22:08

## 2020-03-10 RX ADMIN — Medication 10 ML: at 22:09

## 2020-03-10 RX ADMIN — INSULIN GLARGINE 30 UNITS: 100 INJECTION, SOLUTION SUBCUTANEOUS at 22:07

## 2020-03-10 RX ADMIN — LAMOTRIGINE 150 MG: 25 TABLET ORAL at 22:08

## 2020-03-10 RX ADMIN — ACETAMINOPHEN 650 MG: 325 TABLET ORAL at 22:08

## 2020-03-10 RX ADMIN — BUPRENORPHINE AND NALOXONE 1 FILM: 8; 2 FILM, SOLUBLE BUCCAL; SUBLINGUAL at 22:08

## 2020-03-10 RX ADMIN — BUSPIRONE HYDROCHLORIDE 30 MG: 5 TABLET ORAL at 22:16

## 2020-03-10 RX ADMIN — SODIUM CHLORIDE: 9 INJECTION, SOLUTION INTRAVENOUS at 12:02

## 2020-03-10 RX ADMIN — ATORVASTATIN CALCIUM 80 MG: 80 TABLET, FILM COATED ORAL at 22:09

## 2020-03-10 ASSESSMENT — PAIN DESCRIPTION - DESCRIPTORS: DESCRIPTORS: ACHING

## 2020-03-10 ASSESSMENT — PAIN SCALES - GENERAL
PAINLEVEL_OUTOF10: 0
PAINLEVEL_OUTOF10: 8
PAINLEVEL_OUTOF10: 5
PAINLEVEL_OUTOF10: 9

## 2020-03-10 ASSESSMENT — PAIN DESCRIPTION - LOCATION
LOCATION: BACK
LOCATION: BACK

## 2020-03-10 ASSESSMENT — PAIN - FUNCTIONAL ASSESSMENT: PAIN_FUNCTIONAL_ASSESSMENT: PREVENTS OR INTERFERES SOME ACTIVE ACTIVITIES AND ADLS

## 2020-03-10 ASSESSMENT — PAIN DESCRIPTION - PAIN TYPE
TYPE: CHRONIC PAIN
TYPE: ACUTE PAIN

## 2020-03-10 ASSESSMENT — ENCOUNTER SYMPTOMS
GASTROINTESTINAL NEGATIVE: 1
RESPIRATORY NEGATIVE: 1

## 2020-03-10 ASSESSMENT — PAIN DESCRIPTION - ONSET: ONSET: ON-GOING

## 2020-03-10 ASSESSMENT — PAIN DESCRIPTION - FREQUENCY: FREQUENCY: INTERMITTENT

## 2020-03-10 ASSESSMENT — PAIN DESCRIPTION - PROGRESSION: CLINICAL_PROGRESSION: NOT CHANGED

## 2020-03-10 NOTE — CONSULTS
Nephrology Consult Note  [email or web address]        Reason for Consult: SHAUNNA on CKD  Consulting Physician: Gissell Soto MD    HISTORY OF PRESENT ILLNESS:      The patient is a 62 y.o. female with significant past medical history of CAD, COPD, CHF, DM, GERD, HTN, HLD and PVD who presents with abnormal labs. Patient has a known history of CKD stage 3 with a baseline serum creatinine of 1.5-1.7mg/dL. She was scheduled to have LLE angiogram today with Dr. Jules Collazo but lab work came back with serum creatinine of 3.4mg/dL and BUN of 101 hence she was admitted. Patient denies any nausea, vomiting or diarrhea. No fever or chills. Takes Torsemide and Spironolactone. Still has LE edema but no SOB. Denies intake of NSAIDs or any herbal medication recently. Patient said that her BP has been running low chronically with SBP in the 80smmHg.     Past Medical History:        Diagnosis Date    Anxiety and depression     Arthritis     CAD (coronary artery disease) 01/2018    Cardiomyopathy ([address]     CHF (congestive heart failure) (HCC)     Chronic systolic heart failure ([address] 2/21/2020    COPD (chronic obstructive pulmonary disease) (HCC)     Diabetes mellitus (HCC)     GERD (gastroesophageal reflux disease)     Hyperlipidemia     Hypertension     Hypotension     MI (myocardial infarction) ([address]     Peripheral neuropathy     Peripheral vascular disease ([address]     Pulmonary HTN ([address]     Reflux     Sleep apnea     has never done sleep study;does not use CPAP    Wears glasses     for reading       Past Surgical History:        Procedure Laterality Date    ARTERIAL BYPASS SURGRY Left 01/06/2016    Axillary/Subclavian to Brachial Bypass w/reversed SVG    CARDIAC CATHETERIZATION  03/27/2018    Dr. Jacques Carlos - at [address]  01/20/2020    Dr. Byron Tinoco      pancreatitis post surgery    CORONARY ANGIOPLASTY WITH STENT PLACEMENT  03/16/2018    MELODY- 3.5 x 38 and

## 2020-03-10 NOTE — PROGRESS NOTES
Patient admitted to room 209-02 from Cath Lab. Patient oriented to room, call light, bed rails, phone, lights and bathroom. Patient instructed about the schedule of the day including: vital sign frequency, lab draws, possible tests, frequency of MD and staff rounds, including RN/MD rounding together at bedside, daily weights, and I &O's. Patient instructed about prescribed diet, how to use 8MENU, and television. bed alarm in place, patient aware of placement and reason. Bed locked, in lowest position, side rails up 2/4, call light within reach. Will continue to monitor.

## 2020-03-10 NOTE — H&P
glasses     for reading       Past Surgical History:          Procedure Laterality Date    ARTERIAL BYPASS SURGRY Left 01/06/2016    Axillary/Subclavian to Brachial Bypass w/reversed SVG    CARDIAC CATHETERIZATION  03/27/2018    Dr. Linda Miguel - at [address] [place]  01/20/2020    Dr. Debby Beard      pancreatitis post surgery    CORONARY ANGIOPLASTY WITH STENT PLACEMENT  03/16/2018    MELODY- 3.5 x 38 and 3.5 x 20 to Cx    CORONARY ANGIOPLASTY WITH STENT PLACEMENT  01/03/2018    MELODY- 3.0 x 38 and 2.75 x 26 and 2.75 x 8 and 2.75 x 22 to the LAD    CORONARY ARTERY BYPASS GRAFT N/A 1/27/2020    CORONARY ARTERY BYPASS GRAFTING X 1, ON PUMP BEATING HEART, INTERNAL MAMMARY ARTERY TO LEFT ANTERIOR DESCENDING ARTERY, VAS CATH INSERTION, URI, PLATELET GEL APPLICATION, 5 LEVEL BILATERAL INTERCOSTAL NERVE BLOCK, STERNAL PLATING performed by Fausto Mcarthur MD at [address] Right 5/16/2019    fem-pop, performed by Ti Hunt MD at [address]  02/14/2019    CardioMEMs insertion for CHF    ROTATOR CUFF REPAIR Left 04/22/2016    TONSILLECTOMY      TRANSLUMINAL ANGIOPLASTY Bilateral 10/08/2019    BLE w/PTA occluded L SFA and restenting L Prox SFA    TUMOR EXCISION      benign tumors X 2 chest & axilla    UPPER GASTROINTESTINAL ENDOSCOPY  4/25/2013    BX BAYLEE dunne, gastritis    UPPER GASTROINTESTINAL ENDOSCOPY  12/10/2015    UPPER GASTROINTESTINAL ENDOSCOPY  01/06/2017    Gastritis       Medications Prior to Admission:      Prior to Admission medications    Medication Sig Start Date End Date Taking?  Authorizing Provider   torsemide (DEMADEX) 100 MG tablet Take 1 tablet by mouth daily 3/4/20  Yes TRAY Alvarado CNP   atorvastatin (LIPITOR) 80 MG tablet Take 1 tablet by mouth nightly 2/28/20  Yes TRAY Salgado CNP   tiotropium (SPIRIVA RESPIMAT) 2.5 MCG/ACT AERS inhaler INHALE 2 PUFFS INTO THE LUNGS DAILY 2/25/20  Yes Tyler Sparks MD   potassium chloride (KLOR-CON M) 20 MEQ extended release tablet Take 40 mEq by mouth 4 times daily    Yes Historical Provider, MD   buprenorphine-naloxone (SUBOXONE) 8-2 MG FILM SL film Place 1 Film under the tongue 2 times daily. Yes Historical Provider, MD   albuterol sulfate  (90 Base) MCG/ACT inhaler Inhale 2 puffs into the lungs every 6 hours as needed for Wheezing or Shortness of Breath   Yes Historical Provider, MD   spironolactone (ALDACTONE) 100 MG tablet Take 0.5 tablets by mouth 2 times daily 1/31/20  Yes TRAY Kwok CNP   benztropine (COGENTIN) 1 MG tablet Take 1 mg by mouth nightly  12/13/19  Yes Historical Provider, MD   blood glucose test strips (EXACTECH TEST) strip 6 times daily.  12/19/19  Yes TRAY Chaudhari CNP   Insulin Pen Needle (B-D ULTRAFINE III SHORT PEN) 31G X 8 MM MISC Five shots a day 12/3/19  Yes TRAY Chaudhari CNP   INSULIN SYRINGE .5CC/29G 29G X 1/2\" 0.5 ML MISC 1 each by Does not apply route daily 12/3/19  Yes TRAY Chaudhari CNP   Insulin Degludec (TRESIBA FLEXTOUCH) 100 UNIT/ML SOPN Inject 100 Units into the skin nightly 12/3/19  Yes TRAY Chaudhari CNP   Liraglutide (VICTOZA) 18 MG/3ML SOPN SC injection Inject 1.2 mg into the skin daily 10/29/19  Yes TRAY Chaudhari CNP   vitamin D (ERGOCALCIFEROL) 1.25 MG (52015 UT) CAPS capsule Take 1 capsule by mouth once a week 10/29/19  Yes TRAY Chaudhari CNP   pantoprazole (PROTONIX) 40 MG tablet Take 1 tablet by mouth 2 times daily 10/1/19  Yes Corby Lizama MD   clopidogrel (PLAVIX) 75 MG tablet TAKE 1 TABLET BY MOUTH EVERY DAY 9/3/19  Yes TRAY Naidu CNP   magnesium oxide (MAG-OX) 400 (240 Mg) MG tablet TAKE 1 TABLET ONCE DAILY 5/1/19  Yes Lorene Simon MD   insulin lispro (HUMALOG KWIKPEN) 100 UNIT/ML pen Inject 15 Units into the skin 3 times daily (before meals) 4/9/19  Yes Nai Muller MD   busPIRone (BUSPAR) 30 MG clubbing, cyanosis or edema bilaterally. Full range of motion without deformity. Neurologic:  Neurovascularly intact without any focal sensory/motor deficits. Cranial nerves: II-XII intact, grossly non-focal.  Psychiatric: Alert and oriented, thought content appropriate, normal insight  Skin: Skin color, texture, turgor normal.  No rashes or lesions. Capillary Refill: Brisk,< 3 seconds   Peripheral Pulses: +2 palpable, equal bilaterally       Labs:     Recent Labs     03/10/20  0655   WBC 9.2   HGB 10.9*   HCT 33.1*        Recent Labs     03/10/20  0655   *   K 4.8   CL 86*   CO2 27   *   CREATININE 3.4*   CALCIUM 9.7     Recent Labs     03/10/20  0655   AST 38*   ALT 10   BILITOT <0.2   ALKPHOS 99     Recent Labs     03/10/20  0655   INR 1.09     No results for input(s): CKTOTAL, TROPONINI in the last 72 hours. Radiology:       Ct Abdomen Pelvis Wo Contrast Additional Contrast? None    Result Date: 2/28/2020  EXAMINATION: CT OF THE ABDOMEN AND PELVIS WITHOUT CONTRAST 2/28/2020 9:40 pm TECHNIQUE: CT of the abdomen and pelvis was performed without the administration of intravenous contrast. Multiplanar reformatted images are provided for review. Dose modulation, iterative reconstruction, and/or weight based adjustment of the mA/kV was utilized to reduce the radiation dose to as low as reasonably achievable. COMPARISON: December 25, 2019 HISTORY: ORDERING SYSTEM PROVIDED HISTORY: abdominal pain RLQ, vomiting TECHNOLOGIST PROVIDED HISTORY: Reason for exam:->abdominal pain RLQ, vomiting Additional Contrast?->None Reason for Exam: abdominal pain RLQ, vomiting, GFR 27 Acuity: Acute Type of Exam: Initial FINDINGS: Lower Chest: Fibrotic stranding is again seen within the right middle lobe. Calcified granuloma seen within the left lung base. Organs: Liver is homogeneous. Patient is status post prior cholecystectomy. Small amount of pneumobilia is present.   The pancreas, spleen, appear normal. Adenomatous thickening of adrenal glands is again seen bilaterally. No intrarenal calculi or evidence of hydronephrosis is present. GI/Bowel: Scattered colonic diverticula are noted, without evidence of diverticulitis. The appendix is normal.  Postsurgical changes are seen involving the small bowel. Stomach is nondistended. Pelvis: Urinary bladder is nondistended. Uterus is normal in size for the patient's age. No adnexal masses are present. Peritoneum/Retroperitoneum: No aneurysm formation is present. No pathological adenopathy free fluid seen within the abdomen or pelvis. Bones/Soft Tissues: Degenerate changes are present within the spine. No acute osseous abnormality is present. 1. No acute findings within the abdomen or pelvis. 2. Normal appendix 3. Prior cholecystectomy     Ct Chest Wo Contrast    Result Date: 2/24/2020  EXAMINATION: CT OF THE CHEST WITHOUT CONTRAST 2/24/2020 9:48 am TECHNIQUE: CT of the chest was performed without the administration of intravenous contrast. Multiplanar reformatted images are provided for review. Dose modulation, iterative reconstruction, and/or weight based adjustment of the mA/kV was utilized to reduce the radiation dose to as low as reasonably achievable. COMPARISON: 12/25/2019, 02/08/2019, 02/18/2016 HISTORY: ORDERING SYSTEM PROVIDED HISTORY: Pulmonary nodule TECHNOLOGIST PROVIDED HISTORY: Reason for exam:->see list Reason for Exam: yearly f/u for pulmonary nodule, no current complaints, hx open heart surgery  4 wks ago Acuity: Chronic Type of Exam: Subsequent/Follow-up FINDINGS: Mediastinum: The thyroid is unremarkable. The patient is status post left axillary dissection. There is stable mild borderline enlarged mediastinal lymphadenopathy, with the largest lymph node measuring 12 mm in short axis within the subcarinal space, unchanged. No new pathologic lymphadenopathy is identified.   There is atherosclerotic disease of the thoracic aorta, without evidence

## 2020-03-11 ENCOUNTER — APPOINTMENT (OUTPATIENT)
Dept: CT IMAGING | Age: 55
DRG: 469 | End: 2020-03-11
Attending: SURGERY
Payer: COMMERCIAL

## 2020-03-11 LAB
ANION GAP SERPL CALCULATED.3IONS-SCNC: 14 MMOL/L (ref 3–16)
BUN BLDV-MCNC: 68 MG/DL (ref 7–20)
CALCIUM SERPL-MCNC: 9.1 MG/DL (ref 8.3–10.6)
CHLORIDE BLD-SCNC: 92 MMOL/L (ref 99–110)
CO2: 24 MMOL/L (ref 21–32)
CREAT SERPL-MCNC: 1.5 MG/DL (ref 0.6–1.1)
GFR AFRICAN AMERICAN: 43
GFR NON-AFRICAN AMERICAN: 36
GLUCOSE BLD-MCNC: 116 MG/DL (ref 70–99)
GLUCOSE BLD-MCNC: 125 MG/DL (ref 70–99)
GLUCOSE BLD-MCNC: 148 MG/DL (ref 70–99)
GLUCOSE BLD-MCNC: 178 MG/DL (ref 70–99)
GLUCOSE BLD-MCNC: 191 MG/DL (ref 70–99)
GLUCOSE BLD-MCNC: 94 MG/DL (ref 70–99)
HCT VFR BLD CALC: 30.7 % (ref 36–48)
HEMOGLOBIN: 9.8 G/DL (ref 12–16)
MAGNESIUM: 2.3 MG/DL (ref 1.8–2.4)
MCH RBC QN AUTO: 27.8 PG (ref 26–34)
MCHC RBC AUTO-ENTMCNC: 32 G/DL (ref 31–36)
MCV RBC AUTO: 86.9 FL (ref 80–100)
PDW BLD-RTO: 18.4 % (ref 12.4–15.4)
PERFORMED ON: ABNORMAL
PLATELET # BLD: 307 K/UL (ref 135–450)
PMV BLD AUTO: 8.5 FL (ref 5–10.5)
POTASSIUM REFLEX MAGNESIUM: 3.4 MMOL/L (ref 3.5–5.1)
RBC # BLD: 3.54 M/UL (ref 4–5.2)
SODIUM BLD-SCNC: 130 MMOL/L (ref 136–145)
WBC # BLD: 7 K/UL (ref 4–11)

## 2020-03-11 PROCEDURE — 94761 N-INVAS EAR/PLS OXIMETRY MLT: CPT

## 2020-03-11 PROCEDURE — 6360000002 HC RX W HCPCS: Performed by: INTERNAL MEDICINE

## 2020-03-11 PROCEDURE — 85027 COMPLETE CBC AUTOMATED: CPT

## 2020-03-11 PROCEDURE — 6370000000 HC RX 637 (ALT 250 FOR IP): Performed by: INTERNAL MEDICINE

## 2020-03-11 PROCEDURE — 94640 AIRWAY INHALATION TREATMENT: CPT

## 2020-03-11 PROCEDURE — 36415 COLL VENOUS BLD VENIPUNCTURE: CPT

## 2020-03-11 PROCEDURE — 80048 BASIC METABOLIC PNL TOTAL CA: CPT

## 2020-03-11 PROCEDURE — 1200000000 HC SEMI PRIVATE

## 2020-03-11 PROCEDURE — 2700000000 HC OXYGEN THERAPY PER DAY

## 2020-03-11 PROCEDURE — 74176 CT ABD & PELVIS W/O CONTRAST: CPT

## 2020-03-11 PROCEDURE — 83735 ASSAY OF MAGNESIUM: CPT

## 2020-03-11 PROCEDURE — 2580000003 HC RX 258: Performed by: INTERNAL MEDICINE

## 2020-03-11 RX ORDER — POTASSIUM CHLORIDE 20 MEQ/1
40 TABLET, EXTENDED RELEASE ORAL DAILY
Status: DISCONTINUED | OUTPATIENT
Start: 2020-03-11 | End: 2020-03-12

## 2020-03-11 RX ORDER — CYCLOBENZAPRINE HCL 10 MG
10 TABLET ORAL 3 TIMES DAILY PRN
Status: DISCONTINUED | OUTPATIENT
Start: 2020-03-11 | End: 2020-03-13 | Stop reason: HOSPADM

## 2020-03-11 RX ORDER — HYDROCODONE BITARTRATE AND ACETAMINOPHEN 5; 325 MG/1; MG/1
1 TABLET ORAL EVERY 6 HOURS PRN
Status: DISCONTINUED | OUTPATIENT
Start: 2020-03-11 | End: 2020-03-11

## 2020-03-11 RX ORDER — TORSEMIDE 100 MG/1
50 TABLET ORAL DAILY
Status: DISCONTINUED | OUTPATIENT
Start: 2020-03-11 | End: 2020-03-12

## 2020-03-11 RX ADMIN — TORSEMIDE 50 MG: 100 TABLET ORAL at 10:55

## 2020-03-11 RX ADMIN — BUSPIRONE HYDROCHLORIDE 30 MG: 5 TABLET ORAL at 09:23

## 2020-03-11 RX ADMIN — ATORVASTATIN CALCIUM 80 MG: 80 TABLET, FILM COATED ORAL at 22:05

## 2020-03-11 RX ADMIN — LAMOTRIGINE 150 MG: 25 TABLET ORAL at 09:23

## 2020-03-11 RX ADMIN — INSULIN GLARGINE 30 UNITS: 100 INJECTION, SOLUTION SUBCUTANEOUS at 22:05

## 2020-03-11 RX ADMIN — CYCLOBENZAPRINE 10 MG: 10 TABLET, FILM COATED ORAL at 11:01

## 2020-03-11 RX ADMIN — BUSPIRONE HYDROCHLORIDE 30 MG: 5 TABLET ORAL at 22:03

## 2020-03-11 RX ADMIN — INSULIN LISPRO 15 UNITS: 100 INJECTION, SOLUTION INTRAVENOUS; SUBCUTANEOUS at 18:01

## 2020-03-11 RX ADMIN — BENZTROPINE MESYLATE 1 MG: 1 TABLET ORAL at 22:04

## 2020-03-11 RX ADMIN — LAMOTRIGINE 150 MG: 25 TABLET ORAL at 22:04

## 2020-03-11 RX ADMIN — ARIPIPRAZOLE 10 MG: 10 TABLET ORAL at 09:23

## 2020-03-11 RX ADMIN — POTASSIUM CHLORIDE 40 MEQ: 1500 TABLET, EXTENDED RELEASE ORAL at 10:55

## 2020-03-11 RX ADMIN — Medication 10 ML: at 09:25

## 2020-03-11 RX ADMIN — BUPRENORPHINE AND NALOXONE 1 FILM: 8; 2 FILM, SOLUBLE BUCCAL; SUBLINGUAL at 22:07

## 2020-03-11 RX ADMIN — PANTOPRAZOLE SODIUM 40 MG: 40 TABLET, DELAYED RELEASE ORAL at 09:24

## 2020-03-11 RX ADMIN — PANTOPRAZOLE SODIUM 40 MG: 40 TABLET, DELAYED RELEASE ORAL at 18:01

## 2020-03-11 RX ADMIN — TIOTROPIUM BROMIDE INHALATION SPRAY 2 PUFF: 3.12 SPRAY, METERED RESPIRATORY (INHALATION) at 07:50

## 2020-03-11 RX ADMIN — INSULIN LISPRO 15 UNITS: 100 INJECTION, SOLUTION INTRAVENOUS; SUBCUTANEOUS at 06:53

## 2020-03-11 RX ADMIN — ASPIRIN 81 MG: 81 TABLET, COATED ORAL at 09:24

## 2020-03-11 RX ADMIN — INSULIN LISPRO 15 UNITS: 100 INJECTION, SOLUTION INTRAVENOUS; SUBCUTANEOUS at 12:13

## 2020-03-11 RX ADMIN — CYCLOBENZAPRINE 10 MG: 10 TABLET, FILM COATED ORAL at 22:03

## 2020-03-11 RX ADMIN — ENOXAPARIN SODIUM 30 MG: 30 INJECTION SUBCUTANEOUS at 09:24

## 2020-03-11 RX ADMIN — CLOPIDOGREL BISULFATE 75 MG: 75 TABLET ORAL at 09:24

## 2020-03-11 RX ADMIN — BUPRENORPHINE AND NALOXONE 1 FILM: 8; 2 FILM, SOLUBLE BUCCAL; SUBLINGUAL at 09:24

## 2020-03-11 RX ADMIN — Medication 10 ML: at 22:06

## 2020-03-11 ASSESSMENT — PAIN DESCRIPTION - PAIN TYPE
TYPE: CHRONIC PAIN
TYPE: CHRONIC PAIN

## 2020-03-11 ASSESSMENT — PAIN DESCRIPTION - ORIENTATION
ORIENTATION: RIGHT
ORIENTATION: RIGHT

## 2020-03-11 ASSESSMENT — PAIN DESCRIPTION - LOCATION
LOCATION: BACK
LOCATION: BACK

## 2020-03-11 ASSESSMENT — PAIN SCALES - GENERAL
PAINLEVEL_OUTOF10: 8
PAINLEVEL_OUTOF10: 8

## 2020-03-11 NOTE — PROGRESS NOTES
Hospitalist Progress Note    Date of Admission: 3/10/2020    Chief Complaint: SHAUNNA    Hospital Course: 62 y.o. female who presented to [facility] with SHAUNNA. PMHx significant for CAD status post CABG, CHF, COPD, diabetes, hypertension, CKD and PAD. She initially presented to the hospital for an elective left lower extremity angiogram with Dr. Janet Jonas 855. Preprocedure lab studies showed worsening acute kidney injury with creatinine of 3.4 with a baseline of 1.8 last week. The angiogram was canceled and she was directly admitted for further management of her acute kidney injury. Nephrology was consulted. Of note the patient follows with cardiology and has recently been treated with increasing doses of diuretics for congestive heart failure symptoms. She is received an increased dose of torsemide 100 mg twice daily in addition to intermittent dosing of IV Lasix through home health care at home. Last week she was seen at the cardiologist office and her torsemide was reduced back to 100 mg daily. Patient denies any nausea, vomiting, diarrhea or decreased p.o. intake. Subjective: No chest pain or SOB. Increasing peripheral edema. C/o worsening right back/flank pain. Labs:   Recent Labs     03/10/20  0655 03/11/20  0913   WBC 9.2 7.0   HGB 10.9* 9.8*   HCT 33.1* 30.7*    307     Recent Labs     03/10/20  0655 03/11/20  0913   * 130*   K 4.8 3.4*   CL 86* 92*   CO2 27 24   * 68*   CREATININE 3.4* 1.5*   CALCIUM 9.7 9.1     Recent Labs     03/10/20  0655   AST 38*   ALT 10   BILITOT <0.2   ALKPHOS 99     Recent Labs     03/10/20  0655   INR 1.09       Physical Exam Performed:    /64   Pulse 93   Temp 97.9 °F (36.6 °C) (Oral)   Resp 18   Ht 5' 4\" (1.626 m)   Wt 242 lb 12.8 oz (110.1 kg)   LMP 10/24/2012   SpO2 100%   BMI 41.68 kg/m²     General appearance: No apparent distress, appears stated age and cooperative.   HEENT:  Normal cephalic, atraumatic without obvious

## 2020-03-11 NOTE — PLAN OF CARE
Mobility?: Ambulatory-Up Ad Jasmin  How was this patient Mobilized today?: Edge of Bed, Up to Chair,  Up to Toilet/Shower, Up in Room, and Up in [address] [facility]? Nurse, PCA, and Self                 Level of Difficulty/Assistance: Independent     Pt resting in bed at this time on room air. Pt denies shortness of breath. Pt with nonpitting lower extremity edema. Patient and/or Family's stated Goal of Care this Admission: increase activity tolerance prior to discharge    Diabetes education provided today:    Carbs: good carbs and bad carbs, importance of carb counting, incorporation of protein with each meal to reduce Glycemic index, importance of portions, Carb/insulin ratio.        Alee Gatica

## 2020-03-11 NOTE — PROGRESS NOTES
Spoke to primary RN regarding consultation for sternal incision dehiscence; pt had coronary bypass graft on 1/27/2020 with Dr. Rodríguez Araujo; recommend consulting Dr. Rodríguez Araujo to evaluate the incision; Wound Care to follow.

## 2020-03-12 LAB
ANION GAP SERPL CALCULATED.3IONS-SCNC: 16 MMOL/L (ref 3–16)
BUN BLDV-MCNC: 57 MG/DL (ref 7–20)
CALCIUM SERPL-MCNC: 9 MG/DL (ref 8.3–10.6)
CHLORIDE BLD-SCNC: 86 MMOL/L (ref 99–110)
CO2: 22 MMOL/L (ref 21–32)
CREAT SERPL-MCNC: 1.4 MG/DL (ref 0.6–1.1)
GFR AFRICAN AMERICAN: 47
GFR NON-AFRICAN AMERICAN: 39
GLUCOSE BLD-MCNC: 134 MG/DL (ref 70–99)
GLUCOSE BLD-MCNC: 134 MG/DL (ref 70–99)
GLUCOSE BLD-MCNC: 136 MG/DL (ref 70–99)
GLUCOSE BLD-MCNC: 212 MG/DL (ref 70–99)
GLUCOSE BLD-MCNC: 248 MG/DL (ref 70–99)
GLUCOSE BLD-MCNC: 64 MG/DL (ref 70–99)
GLUCOSE BLD-MCNC: 67 MG/DL (ref 70–99)
MAGNESIUM: 1.8 MG/DL (ref 1.8–2.4)
PERFORMED ON: ABNORMAL
POTASSIUM REFLEX MAGNESIUM: 3.2 MMOL/L (ref 3.5–5.1)
SODIUM BLD-SCNC: 124 MMOL/L (ref 136–145)

## 2020-03-12 PROCEDURE — 6370000000 HC RX 637 (ALT 250 FOR IP): Performed by: INTERNAL MEDICINE

## 2020-03-12 PROCEDURE — 6360000002 HC RX W HCPCS: Performed by: INTERNAL MEDICINE

## 2020-03-12 PROCEDURE — 2580000003 HC RX 258: Performed by: INTERNAL MEDICINE

## 2020-03-12 PROCEDURE — 6370000000 HC RX 637 (ALT 250 FOR IP): Performed by: NURSE PRACTITIONER

## 2020-03-12 PROCEDURE — 80048 BASIC METABOLIC PNL TOTAL CA: CPT

## 2020-03-12 PROCEDURE — 83735 ASSAY OF MAGNESIUM: CPT

## 2020-03-12 PROCEDURE — 94640 AIRWAY INHALATION TREATMENT: CPT

## 2020-03-12 PROCEDURE — 36415 COLL VENOUS BLD VENIPUNCTURE: CPT

## 2020-03-12 PROCEDURE — 1200000000 HC SEMI PRIVATE

## 2020-03-12 PROCEDURE — 99024 POSTOP FOLLOW-UP VISIT: CPT | Performed by: THORACIC SURGERY (CARDIOTHORACIC VASCULAR SURGERY)

## 2020-03-12 RX ORDER — LACTOBACILLUS RHAMNOSUS GG 10B CELL
1 CAPSULE ORAL
Status: DISCONTINUED | OUTPATIENT
Start: 2020-03-12 | End: 2020-03-13 | Stop reason: HOSPADM

## 2020-03-12 RX ORDER — AMOXICILLIN AND CLAVULANATE POTASSIUM 875; 125 MG/1; MG/1
1 TABLET, FILM COATED ORAL EVERY 12 HOURS SCHEDULED
Status: DISCONTINUED | OUTPATIENT
Start: 2020-03-12 | End: 2020-03-13 | Stop reason: HOSPADM

## 2020-03-12 RX ORDER — TORSEMIDE 100 MG/1
50 TABLET ORAL ONCE
Status: COMPLETED | OUTPATIENT
Start: 2020-03-12 | End: 2020-03-12

## 2020-03-12 RX ORDER — FUROSEMIDE 10 MG/ML
40 INJECTION INTRAMUSCULAR; INTRAVENOUS ONCE
Status: COMPLETED | OUTPATIENT
Start: 2020-03-12 | End: 2020-03-12

## 2020-03-12 RX ORDER — MAGNESIUM SULFATE 1 G/100ML
1 INJECTION INTRAVENOUS ONCE
Status: COMPLETED | OUTPATIENT
Start: 2020-03-12 | End: 2020-03-12

## 2020-03-12 RX ORDER — POTASSIUM CHLORIDE 20 MEQ/1
40 TABLET, EXTENDED RELEASE ORAL 2 TIMES DAILY
Status: DISCONTINUED | OUTPATIENT
Start: 2020-03-12 | End: 2020-03-13

## 2020-03-12 RX ORDER — TORSEMIDE 100 MG/1
100 TABLET ORAL DAILY
Status: DISCONTINUED | OUTPATIENT
Start: 2020-03-13 | End: 2020-03-13 | Stop reason: HOSPADM

## 2020-03-12 RX ORDER — POTASSIUM CHLORIDE 20 MEQ/1
40 TABLET, EXTENDED RELEASE ORAL ONCE
Status: COMPLETED | OUTPATIENT
Start: 2020-03-12 | End: 2020-03-12

## 2020-03-12 RX ADMIN — BUPRENORPHINE AND NALOXONE 1 FILM: 8; 2 FILM, SOLUBLE BUCCAL; SUBLINGUAL at 08:26

## 2020-03-12 RX ADMIN — CYCLOBENZAPRINE 10 MG: 10 TABLET, FILM COATED ORAL at 14:37

## 2020-03-12 RX ADMIN — BENZTROPINE MESYLATE 1 MG: 1 TABLET ORAL at 20:38

## 2020-03-12 RX ADMIN — LAMOTRIGINE 150 MG: 25 TABLET ORAL at 20:38

## 2020-03-12 RX ADMIN — BUSPIRONE HYDROCHLORIDE 30 MG: 5 TABLET ORAL at 20:37

## 2020-03-12 RX ADMIN — ACETAMINOPHEN 650 MG: 325 TABLET ORAL at 22:24

## 2020-03-12 RX ADMIN — CLOPIDOGREL BISULFATE 75 MG: 75 TABLET ORAL at 08:26

## 2020-03-12 RX ADMIN — AMOXICILLIN AND CLAVULANATE POTASSIUM 1 TABLET: 875; 125 TABLET, FILM COATED ORAL at 20:38

## 2020-03-12 RX ADMIN — PANTOPRAZOLE SODIUM 40 MG: 40 TABLET, DELAYED RELEASE ORAL at 17:57

## 2020-03-12 RX ADMIN — ENOXAPARIN SODIUM 40 MG: 40 INJECTION SUBCUTANEOUS at 08:27

## 2020-03-12 RX ADMIN — BUPRENORPHINE AND NALOXONE 1 FILM: 8; 2 FILM, SOLUBLE BUCCAL; SUBLINGUAL at 20:38

## 2020-03-12 RX ADMIN — ASPIRIN 81 MG: 81 TABLET, COATED ORAL at 08:26

## 2020-03-12 RX ADMIN — TORSEMIDE 50 MG: 100 TABLET ORAL at 20:43

## 2020-03-12 RX ADMIN — BUSPIRONE HYDROCHLORIDE 30 MG: 5 TABLET ORAL at 08:26

## 2020-03-12 RX ADMIN — TORSEMIDE 50 MG: 100 TABLET ORAL at 08:26

## 2020-03-12 RX ADMIN — INSULIN LISPRO 15 UNITS: 100 INJECTION, SOLUTION INTRAVENOUS; SUBCUTANEOUS at 17:57

## 2020-03-12 RX ADMIN — INSULIN GLARGINE 30 UNITS: 100 INJECTION, SOLUTION SUBCUTANEOUS at 22:26

## 2020-03-12 RX ADMIN — LAMOTRIGINE 150 MG: 25 TABLET ORAL at 08:26

## 2020-03-12 RX ADMIN — POTASSIUM CHLORIDE 40 MEQ: 20 TABLET, EXTENDED RELEASE ORAL at 20:38

## 2020-03-12 RX ADMIN — PANTOPRAZOLE SODIUM 40 MG: 40 TABLET, DELAYED RELEASE ORAL at 08:26

## 2020-03-12 RX ADMIN — ARIPIPRAZOLE 10 MG: 10 TABLET ORAL at 08:26

## 2020-03-12 RX ADMIN — AMOXICILLIN AND CLAVULANATE POTASSIUM 1 TABLET: 875; 125 TABLET, FILM COATED ORAL at 11:36

## 2020-03-12 RX ADMIN — MAGNESIUM SULFATE HEPTAHYDRATE 1 G: 1 INJECTION, SOLUTION INTRAVENOUS at 14:38

## 2020-03-12 RX ADMIN — POTASSIUM CHLORIDE 40 MEQ: 1500 TABLET, EXTENDED RELEASE ORAL at 08:26

## 2020-03-12 RX ADMIN — Medication 10 ML: at 08:27

## 2020-03-12 RX ADMIN — Medication 1 CAPSULE: at 17:57

## 2020-03-12 RX ADMIN — POTASSIUM CHLORIDE 40 MEQ: 1500 TABLET, EXTENDED RELEASE ORAL at 14:37

## 2020-03-12 RX ADMIN — Medication 15 G: at 20:37

## 2020-03-12 RX ADMIN — Medication 10 ML: at 20:40

## 2020-03-12 RX ADMIN — ATORVASTATIN CALCIUM 80 MG: 80 TABLET, FILM COATED ORAL at 20:38

## 2020-03-12 RX ADMIN — FUROSEMIDE 40 MG: 10 INJECTION, SOLUTION INTRAMUSCULAR; INTRAVENOUS at 14:37

## 2020-03-12 RX ADMIN — TIOTROPIUM BROMIDE INHALATION SPRAY 2 PUFF: 3.12 SPRAY, METERED RESPIRATORY (INHALATION) at 08:01

## 2020-03-12 RX ADMIN — CYCLOBENZAPRINE 10 MG: 10 TABLET, FILM COATED ORAL at 20:38

## 2020-03-12 ASSESSMENT — PAIN SCALES - GENERAL
PAINLEVEL_OUTOF10: 4
PAINLEVEL_OUTOF10: 3

## 2020-03-12 ASSESSMENT — PAIN SCALES - WONG BAKER: WONGBAKER_NUMERICALRESPONSE: 0

## 2020-03-12 ASSESSMENT — PAIN DESCRIPTION - LOCATION: LOCATION: BACK

## 2020-03-12 ASSESSMENT — PAIN DESCRIPTION - PAIN TYPE: TYPE: CHRONIC PAIN

## 2020-03-12 NOTE — PROGRESS NOTES
CVTS Thoracic Progress Note:          CC: sternal incision with redness    Pt known to service:  CABG with Dr. Marianne Sloan on 1/27/20    Subj: awake, sitting up in bed, just ate breakfast    Obj:    Blood pressure 106/73, pulse 97, temperature 97.7 °F (36.5 °C), temperature source Oral, resp. rate 18, height 5' 4\" (1.626 m), weight 249 lb (112.9 kg), last menstrual period 10/24/2012, SpO2 96 %, not currently breastfeeding. Lungs CTAB   Abdomen round, soft, non-tender   Incision without drainage when pressure applied     Diagnostics:   Recent Labs     03/10/20  0655 03/11/20  0913   WBC 9.2 7.0   HGB 10.9* 9.8*   HCT 33.1* 30.7*    307                                                                  Recent Labs     03/10/20  0655 03/11/20  0913   * 130*   K 4.8 3.4*   CL 86* 92*   CO2 27 24   * 68*   CREATININE 3.4* 1.5*   GLUCOSE 75 94            Recent Labs     03/11/20  0913   MG 2.30      Recent Labs     03/10/20  0655   INR 1.09     Assess/Plan:   Care per primary team    Sternal incision:   We had seen pt when she was at cardiology F/U appt  Following up with her today. Incision now appears more red, remains without drainage. She is afebrile and WBC is 11 on 3/11/20. Will start course of Augmentin for one week with probiotic as well. She will need to follow up in office with Dr. Quang Lomeli in one week to see if she will need any debridement. Call with questions.        ________________________________________________________________      Kelin Taveras, CNP  3/12/2020  9:18 AM

## 2020-03-12 NOTE — PROGRESS NOTES
Nephrology Progress Note   [email or web address]      This patient is a 62year old female whom we are following for SHAUNNA on CKD. Subjective: The patient was seen and examined. Eating lunch. BP stable. 7lbs weight gain from yesterday. Serum sodium 124mmol/L. Family History: No family at bedside  ROS: No nausea or vomiting      Vitals:  /68   Pulse 92   Temp 97.8 °F (36.6 °C) (Axillary)   Resp 16   Ht 5' 4\" (1.626 m)   Wt 249 lb (112.9 kg)   LMP 10/24/2012   SpO2 97%   BMI 42.74 kg/m²   I/O last 3 completed shifts: In: 1320 [P.O.:1320]  Out: 2300 [Urine:2300]  I/O this shift: In: 480 [P.O.:480]  Out: 1000 [Urine:1000]    Physical Exam:  Physical Exam  Vitals signs reviewed. Constitutional:       General: She is not in acute distress. Appearance: Normal appearance. HENT:      Head: Normocephalic and atraumatic. Mouth/Throat:      Mouth: Mucous membranes are moist.   Eyes:      General: No scleral icterus. Conjunctiva/sclera: Conjunctivae normal.   Cardiovascular:      Rate and Rhythm: Normal rate. Heart sounds: No friction rub. Pulmonary:      Effort: Pulmonary effort is normal. No respiratory distress. Breath sounds: No wheezing. Abdominal:      General: Bowel sounds are normal. There is no distension. Tenderness: There is no abdominal tenderness. Musculoskeletal:      Right lower leg: Edema present. Left lower leg: Edema present. Neurological:      Mental Status: She is alert.            Medications:   amoxicillin-clavulanate  1 tablet Oral 2 times per day    lactobacillus  1 capsule Oral Dinner    furosemide  40 mg Intravenous Once    potassium chloride  40 mEq Oral BID    potassium chloride  40 mEq Oral Once    [START ON 3/13/2020] torsemide  100 mg Oral Daily    enoxaparin  40 mg Subcutaneous Daily    ARIPiprazole  10 mg Oral Daily    aspirin EC  81 mg Oral Daily    atorvastatin  80 mg Oral Nightly    benztropine  1 mg Oral Nightly    buprenorphine-naloxone  1 Film Sublingual BID    busPIRone  30 mg Oral BID    clopidogrel  75 mg Oral Daily    insulin lispro  15 Units Subcutaneous TID AC    lamoTRIgine  150 mg Oral BID    pantoprazole  40 mg Oral BID    tiotropium  2 puff Inhalation Daily    sodium chloride flush  10 mL Intravenous 2 times per day    insulin glargine  30 Units Subcutaneous Nightly         Labs:  Recent Labs     03/10/20  0655 03/11/20  0913   WBC 9.2 7.0   HGB 10.9* 9.8*   HCT 33.1* 30.7*   MCV 86.5 86.9    307     Recent Labs     03/10/20  0655 03/11/20  0913 03/12/20  0900   * 130* 124*   K 4.8 3.4* 3.2*   CL 86* 92* 86*   CO2 27 24 22   GLUCOSE 75 94 136*   MG  --  2.30 1.80   * 68* 57*   CREATININE 3.4* 1.5* 1.4*   LABGLOM 14* 36* 39*   GFRAA 17* 43* 47*           Assessment/Plan:    Acute Kidney Injury, resolved. - Suspect this is hemodynamic in nature. Low BP with intensification of diuretic regimen recently. - Urinalysis with no hematuria or proteinuria. Renal ultrasound with no sign of obstruction.  - Serum creatinine is now stable at baseline.  - Avoid hypotension.  - Please avoid any nephrotoxic agents such as NSAIDs or IV contrast unless deemed necessary.     CKD stage 3.  - From DM and HTN. - Baseline serum creatinine of 1.5-1.7mg/dL. - Has an upcoming appointment with Dr. Adeline Albarado on 3/26/20 at 2:30PM.    Edema.  - Significant weight gain from yesterday despite restarting Torsemide. - Will give Lasix 40mg IV x 1 today and increase Torsemide dose to 100mg daily.  - Fluid and sodium restriction.  - Daily weights. Hypervolemic Hyponatremia.  - Diuretic adjustment as above. - BMP in am.     Anemia.  - Monitor Hgb closely. - PRN blood transfusion per primary service. Hypokalemia.  - KCl 40meq PO BID with extra dose today.     PVD.  - LLE angiogram postponed because of SHAUNNA. - Vascular surgery following.     CAD.  - S/P CABG on 1/27/20.     DM.   - Per primary service.     Keep another day to ensure she is responding and tolerating current dose of diuretic. Please do not hesitate to contact me at [phone] if with questions. Thank you!     Zaira Morfin MD  3/12/2020  [facility]

## 2020-03-12 NOTE — PLAN OF CARE
Problem: HEMODYNAMIC STATUS  Goal: Patient has stable vital signs and fluid balance  Outcome: Ongoing       Patient's EF (Ejection Fraction) is less than 40%    Heart Failure Medications:  Diuretics[de-identified] Torsemide    (One of the following REQUIRED for EF <40%/SYSTOLIC FAILURE but MAY be used in EF% >40%/DIASTOLIC FAILURE)        ACE[de-identified] None        ARB[de-identified] None         ARNI[de-identified] None    (Beta Blockers)  NON- Evidenced Based Beta Blocker (for EF% >40%/DIASTOLIC FAILURE): None    Evidenced Based Beta Blocker::(REQUIRED for EF% <40%/SYSTOLIC FAILURE) None  . .................................................................................................................................................. Patient's weights and intake/output reviewed: Yes    Patient's Last Weight: 249 lbs obtained by standing scale. Difference of 7 lbs more than last documented weight. Pt was on IVF for SHAUNNA. Intake/Output Summary (Last 24 hours) at 3/12/2020 1253  Last data filed at 3/12/2020 1205  Gross per 24 hour   Intake 1440 ml   Output 2000 ml   Net -560 ml       Comorbidities Reviewed Yes    Patient has a past medical history of Anxiety and depression, Arthritis, CAD (coronary artery disease), Cardiomyopathy ([address] CHF (congestive heart failure) ([address] Chronic systolic heart failure ([address] COPD (chronic obstructive pulmonary disease) ([address] Diabetes mellitus ([address] GERD (gastroesophageal reflux disease), Hyperlipidemia, Hypertension, Hypotension, MI (myocardial infarction) ([address] Peripheral neuropathy, Peripheral vascular disease ([address] Pulmonary HTN ([address] Reflux, Sleep apnea, and Wears glasses.      >>For CHF and Comorbidity documentation on Education Time and Topics, please see Education Tab    Progressive Mobility Assessment:  What is this patient's Current Level of Mobility?: Ambulatory-Up Ad Jasmin  How was this patient Mobilized today?: Edge of Bed, Up to Chair,  Up to Toilet/Shower, Up in Room, and Up in [place]

## 2020-03-12 NOTE — PLAN OF CARE
Problem: Serum Glucose Level - Abnormal:  Goal: Ability to maintain appropriate glucose levels will improve  Description: Ability to maintain appropriate glucose levels will improve  Outcome: Ongoing   Pt educated on the importance of a carb control diet. Monitoring pt's blood glucose AC/HS. Sliding scale insulin given as ordered according to pt's blood glucose. Will continue to monitor.

## 2020-03-12 NOTE — PROGRESS NOTES
Hospitalist Progress Note    Date of Admission: 3/10/2020    Chief Complaint: SHAUNNA    Hospital Course: 62 y.o. female who presented to [facility] with SHAUNNA. PMHx significant for CAD status post CABG, CHF, COPD, diabetes, hypertension, CKD and PAD. She initially presented to the hospital for an elective left lower extremity angiogram with Dr. Janis Angulo. Preprocedure lab studies showed worsening acute kidney injury with creatinine of 3.4 with a baseline of 1.8 last week. The angiogram was canceled and she was directly admitted for further management of her acute kidney injury. Nephrology was consulted. Of note the patient follows with cardiology and has recently been treated with increasing doses of diuretics for congestive heart failure symptoms. She is received an increased dose of torsemide 100 mg twice daily in addition to intermittent dosing of IV Lasix through home health care at home. Last week she was seen at the cardiologist office and her torsemide was reduced back to 100 mg daily. Patient denies any nausea, vomiting, diarrhea or decreased p.o. intake. Subjective: No chest pain or SOB. Feels well. Na down to 124. Noted increased drainage from sternal incision. Seems to have less right back/flank pain.       Labs:   Recent Labs     03/10/20  0655 03/11/20  0913   WBC 9.2 7.0   HGB 10.9* 9.8*   HCT 33.1* 30.7*    307     Recent Labs     03/10/20  0655 03/11/20  0913 03/12/20  0900   * 130* 124*   K 4.8 3.4* 3.2*   CL 86* 92* 86*   CO2 27 24 22   * 68* 57*   CREATININE 3.4* 1.5* 1.4*   CALCIUM 9.7 9.1 9.0     Recent Labs     03/10/20  0655   AST 38*   ALT 10   BILITOT <0.2   ALKPHOS 99     Recent Labs     03/10/20  0655   INR 1.09       Physical Exam Performed:    /73   Pulse 97   Temp 97.7 °F (36.5 °C) (Oral)   Resp 18   Ht 5' 4\" (1.626 m)   Wt 249 lb (112.9 kg)   LMP 10/24/2012   SpO2 96%   BMI 42.74 kg/m²     General appearance: No apparent distress, appears recovery of renal function. Follow-up with vascular surgery. Right flank pain: Renal US ok. Check CT to r/o stone. Hypertension, benign: Controlled. Continue current medication regimen, monitor and adjust as needed. Diabetes Mellitus, Type 2: Controlled. Takes 40 QHS, 15 TID AC. Continue reduced dose basal-bolus Insulin regimen. Monitor blood sugar and adjust regimen as needed. Diabetic (Carb-controlled) diet when able. COPD (chronic obstructive pulmonary disease): Stable. Monitor. DVT Prophylaxis: Lovenox  Diet: DIET CARDIAC; Low Sodium (2 GM);  Daily Fluid Restriction: 1500 ml  Code Status: Full Code  PT/OT Eval Status: NA    Dispo - [facility] home tomorrow if Na improved    Patria Young MD

## 2020-03-12 NOTE — PROGRESS NOTES
Bedside report given to [facility]. Call light and bedside table within reach. No needs voiced at this time. Bed in lowest position, brakes locked.

## 2020-03-13 VITALS
RESPIRATION RATE: 17 BRPM | HEART RATE: 93 BPM | TEMPERATURE: 98 F | DIASTOLIC BLOOD PRESSURE: 67 MMHG | BODY MASS INDEX: 42.46 KG/M2 | WEIGHT: 248.7 LBS | OXYGEN SATURATION: 97 % | SYSTOLIC BLOOD PRESSURE: 95 MMHG | HEIGHT: 64 IN

## 2020-03-13 LAB
ANION GAP SERPL CALCULATED.3IONS-SCNC: 15 MMOL/L (ref 3–16)
ANION GAP SERPL CALCULATED.3IONS-SCNC: 15 MMOL/L (ref 3–16)
BUN BLDV-MCNC: 52 MG/DL (ref 7–20)
BUN BLDV-MCNC: 53 MG/DL (ref 7–20)
CALCIUM SERPL-MCNC: 8.8 MG/DL (ref 8.3–10.6)
CALCIUM SERPL-MCNC: 9.1 MG/DL (ref 8.3–10.6)
CHLORIDE BLD-SCNC: 85 MMOL/L (ref 99–110)
CHLORIDE BLD-SCNC: 86 MMOL/L (ref 99–110)
CO2: 28 MMOL/L (ref 21–32)
CO2: 29 MMOL/L (ref 21–32)
CREAT SERPL-MCNC: 1.3 MG/DL (ref 0.6–1.1)
CREAT SERPL-MCNC: 1.3 MG/DL (ref 0.6–1.1)
GFR AFRICAN AMERICAN: 51
GFR AFRICAN AMERICAN: 51
GFR NON-AFRICAN AMERICAN: 42
GFR NON-AFRICAN AMERICAN: 42
GLUCOSE BLD-MCNC: 129 MG/DL (ref 70–99)
GLUCOSE BLD-MCNC: 143 MG/DL (ref 70–99)
GLUCOSE BLD-MCNC: 173 MG/DL (ref 70–99)
GLUCOSE BLD-MCNC: 183 MG/DL (ref 70–99)
GLUCOSE BLD-MCNC: 188 MG/DL (ref 70–99)
MAGNESIUM: 1.7 MG/DL (ref 1.8–2.4)
MAGNESIUM: 1.8 MG/DL (ref 1.8–2.4)
PERFORMED ON: ABNORMAL
POTASSIUM REFLEX MAGNESIUM: 2.6 MMOL/L (ref 3.5–5.1)
POTASSIUM REFLEX MAGNESIUM: 3 MMOL/L (ref 3.5–5.1)
SODIUM BLD-SCNC: 129 MMOL/L (ref 136–145)
SODIUM BLD-SCNC: 129 MMOL/L (ref 136–145)

## 2020-03-13 PROCEDURE — 36415 COLL VENOUS BLD VENIPUNCTURE: CPT

## 2020-03-13 PROCEDURE — 94640 AIRWAY INHALATION TREATMENT: CPT

## 2020-03-13 PROCEDURE — 6360000002 HC RX W HCPCS: Performed by: INTERNAL MEDICINE

## 2020-03-13 PROCEDURE — 6370000000 HC RX 637 (ALT 250 FOR IP): Performed by: INTERNAL MEDICINE

## 2020-03-13 PROCEDURE — 83735 ASSAY OF MAGNESIUM: CPT

## 2020-03-13 PROCEDURE — 80048 BASIC METABOLIC PNL TOTAL CA: CPT

## 2020-03-13 PROCEDURE — 2580000003 HC RX 258: Performed by: INTERNAL MEDICINE

## 2020-03-13 PROCEDURE — 6370000000 HC RX 637 (ALT 250 FOR IP): Performed by: NURSE PRACTITIONER

## 2020-03-13 RX ORDER — POTASSIUM CHLORIDE 20 MEQ/1
40 TABLET, EXTENDED RELEASE ORAL ONCE
Status: COMPLETED | OUTPATIENT
Start: 2020-03-13 | End: 2020-03-13

## 2020-03-13 RX ORDER — LACTOBACILLUS RHAMNOSUS GG 10B CELL
1 CAPSULE ORAL
Qty: 7 CAPSULE | Refills: 0 | Status: ON HOLD | OUTPATIENT
Start: 2020-03-13 | End: 2020-04-02 | Stop reason: HOSPADM

## 2020-03-13 RX ORDER — SODIUM CHLORIDE 9 MG/ML
INJECTION, SOLUTION INTRAVENOUS
Status: DISCONTINUED
Start: 2020-03-13 | End: 2020-03-13 | Stop reason: HOSPADM

## 2020-03-13 RX ORDER — AMOXICILLIN AND CLAVULANATE POTASSIUM 875; 125 MG/1; MG/1
1 TABLET, FILM COATED ORAL EVERY 12 HOURS SCHEDULED
Qty: 12 TABLET | Refills: 0 | Status: SHIPPED | OUTPATIENT
Start: 2020-03-13 | End: 2020-03-19

## 2020-03-13 RX ORDER — CYCLOBENZAPRINE HCL 10 MG
10 TABLET ORAL 3 TIMES DAILY PRN
Qty: 21 TABLET | Refills: 0 | Status: ON HOLD | OUTPATIENT
Start: 2020-03-13 | End: 2020-04-02 | Stop reason: HOSPADM

## 2020-03-13 RX ORDER — INSULIN DEGLUDEC INJECTION 100 U/ML
30 INJECTION, SOLUTION SUBCUTANEOUS NIGHTLY
Qty: 10 PEN | Refills: 5 | Status: SHIPPED
Start: 2020-03-13 | End: 2020-05-27 | Stop reason: SDUPTHER

## 2020-03-13 RX ORDER — POTASSIUM CHLORIDE 20 MEQ/1
40 TABLET, EXTENDED RELEASE ORAL 4 TIMES DAILY
Status: DISCONTINUED | OUTPATIENT
Start: 2020-03-13 | End: 2020-03-13 | Stop reason: HOSPADM

## 2020-03-13 RX ORDER — POTASSIUM CHLORIDE 7.45 MG/ML
10 INJECTION INTRAVENOUS
Status: DISCONTINUED | OUTPATIENT
Start: 2020-03-13 | End: 2020-03-13

## 2020-03-13 RX ADMIN — PANTOPRAZOLE SODIUM 40 MG: 40 TABLET, DELAYED RELEASE ORAL at 16:56

## 2020-03-13 RX ADMIN — INSULIN LISPRO 15 UNITS: 100 INJECTION, SOLUTION INTRAVENOUS; SUBCUTANEOUS at 08:58

## 2020-03-13 RX ADMIN — ASPIRIN 81 MG: 81 TABLET, COATED ORAL at 08:56

## 2020-03-13 RX ADMIN — POTASSIUM CHLORIDE 40 MEQ: 1500 TABLET, EXTENDED RELEASE ORAL at 12:27

## 2020-03-13 RX ADMIN — ENOXAPARIN SODIUM 40 MG: 40 INJECTION SUBCUTANEOUS at 08:53

## 2020-03-13 RX ADMIN — AMOXICILLIN AND CLAVULANATE POTASSIUM 1 TABLET: 875; 125 TABLET, FILM COATED ORAL at 08:55

## 2020-03-13 RX ADMIN — INSULIN LISPRO 15 UNITS: 100 INJECTION, SOLUTION INTRAVENOUS; SUBCUTANEOUS at 17:31

## 2020-03-13 RX ADMIN — ARIPIPRAZOLE 10 MG: 10 TABLET ORAL at 08:54

## 2020-03-13 RX ADMIN — BUPRENORPHINE AND NALOXONE 1 FILM: 8; 2 FILM, SOLUBLE BUCCAL; SUBLINGUAL at 08:56

## 2020-03-13 RX ADMIN — POTASSIUM CHLORIDE 40 MEQ: 1500 TABLET, EXTENDED RELEASE ORAL at 16:56

## 2020-03-13 RX ADMIN — Medication 1 CAPSULE: at 16:56

## 2020-03-13 RX ADMIN — INSULIN LISPRO 15 UNITS: 100 INJECTION, SOLUTION INTRAVENOUS; SUBCUTANEOUS at 13:01

## 2020-03-13 RX ADMIN — Medication 10 ML: at 08:57

## 2020-03-13 RX ADMIN — PANTOPRAZOLE SODIUM 40 MG: 40 TABLET, DELAYED RELEASE ORAL at 08:56

## 2020-03-13 RX ADMIN — TORSEMIDE 100 MG: 100 TABLET ORAL at 08:55

## 2020-03-13 RX ADMIN — CLOPIDOGREL BISULFATE 75 MG: 75 TABLET ORAL at 08:56

## 2020-03-13 RX ADMIN — TIOTROPIUM BROMIDE INHALATION SPRAY 2 PUFF: 3.12 SPRAY, METERED RESPIRATORY (INHALATION) at 11:38

## 2020-03-13 RX ADMIN — POTASSIUM CHLORIDE 10 MEQ: 7.46 INJECTION, SOLUTION INTRAVENOUS at 10:02

## 2020-03-13 RX ADMIN — BUSPIRONE HYDROCHLORIDE 30 MG: 5 TABLET ORAL at 08:53

## 2020-03-13 RX ADMIN — LAMOTRIGINE 150 MG: 25 TABLET ORAL at 08:54

## 2020-03-13 RX ADMIN — CYCLOBENZAPRINE 10 MG: 10 TABLET, FILM COATED ORAL at 10:21

## 2020-03-13 RX ADMIN — POTASSIUM CHLORIDE 40 MEQ: 1500 TABLET, EXTENDED RELEASE ORAL at 13:01

## 2020-03-13 RX ADMIN — ACETAMINOPHEN 650 MG: 325 TABLET ORAL at 10:20

## 2020-03-13 RX ADMIN — POTASSIUM CHLORIDE 40 MEQ: 20 TABLET, EXTENDED RELEASE ORAL at 08:54

## 2020-03-13 ASSESSMENT — PAIN SCALES - GENERAL
PAINLEVEL_OUTOF10: 0
PAINLEVEL_OUTOF10: 7
PAINLEVEL_OUTOF10: 0

## 2020-03-13 ASSESSMENT — PAIN DESCRIPTION - PROGRESSION: CLINICAL_PROGRESSION: NOT CHANGED

## 2020-03-13 ASSESSMENT — PAIN DESCRIPTION - FREQUENCY: FREQUENCY: CONTINUOUS

## 2020-03-13 ASSESSMENT — PAIN DESCRIPTION - DIRECTION: RADIATING_TOWARDS: ABDOMEN

## 2020-03-13 ASSESSMENT — PAIN DESCRIPTION - PAIN TYPE
TYPE: CHRONIC PAIN
TYPE: CHRONIC PAIN

## 2020-03-13 ASSESSMENT — PAIN DESCRIPTION - LOCATION
LOCATION: BACK
LOCATION: BACK

## 2020-03-13 ASSESSMENT — PAIN DESCRIPTION - ONSET: ONSET: ON-GOING

## 2020-03-13 ASSESSMENT — PAIN DESCRIPTION - DESCRIPTORS: DESCRIPTORS: SHARP;ACHING

## 2020-03-13 ASSESSMENT — PAIN DESCRIPTION - ORIENTATION: ORIENTATION: RIGHT;LOWER

## 2020-03-13 NOTE — PROGRESS NOTES
Nephrology Progress Note   [email or web address]      This patient is a 62year old female whom we are following for SHAUNNA on CKD. Subjective: The patient was seen and examined. Serum potassium was 2.6mmol/L this morning, ordered IV KCl but on examination patient was crying because of pain with infusion. UO=4.7L the past 24H. Family History: Family at bedside  ROS: No nausea or vomiting      Vitals:  BP 99/65   Pulse 97   Temp 97.6 °F (36.4 °C) (Oral)   Resp 16   Ht 5' 4\" (1.626 m)   Wt 248 lb 11.2 oz (112.8 kg)   LMP 10/24/2012   SpO2 99%   BMI 42.69 kg/m²   I/O last 3 completed shifts: In: 1380 [P.O.:1380]  Out: 4700 [Urine:4700]  I/O this shift:  In: 240 [P.O.:240]  Out: 1600 [Urine:1600]    Physical Exam:  Physical Exam  Vitals signs reviewed. Constitutional:       General: She is not in acute distress. Appearance: Normal appearance. HENT:      Head: Normocephalic and atraumatic. Mouth/Throat:      Mouth: Mucous membranes are moist.   Eyes:      General: No scleral icterus. Conjunctiva/sclera: Conjunctivae normal.   Cardiovascular:      Rate and Rhythm: Normal rate. Heart sounds: No friction rub. Pulmonary:      Effort: Pulmonary effort is normal. No respiratory distress. Breath sounds: No wheezing. Abdominal:      General: Bowel sounds are normal. There is no distension. Tenderness: There is no abdominal tenderness. Musculoskeletal:      Right lower leg: Edema present. Left lower leg: Edema present. Neurological:      Mental Status: She is alert.            Medications:   potassium chloride  40 mEq Oral 4x Daily    amoxicillin-clavulanate  1 tablet Oral 2 times per day    lactobacillus  1 capsule Oral Dinner    torsemide  100 mg Oral Daily    enoxaparin  40 mg Subcutaneous Daily    ARIPiprazole  10 mg Oral Daily    aspirin EC  81 mg Oral Daily    atorvastatin  80 mg Oral Nightly    benztropine  1 mg Oral Nightly    buprenorphine-naloxone  1 Film Sublingual BID    busPIRone  30 mg Oral BID    clopidogrel  75 mg Oral Daily    insulin lispro  15 Units Subcutaneous TID AC    lamoTRIgine  150 mg Oral BID    pantoprazole  40 mg Oral BID    tiotropium  2 puff Inhalation Daily    sodium chloride flush  10 mL Intravenous 2 times per day    insulin glargine  30 Units Subcutaneous Nightly         Labs:  Recent Labs     03/11/20  0913   WBC 7.0   HGB 9.8*   HCT 30.7*   MCV 86.9        Recent Labs     03/11/20  0913 03/12/20  0900 03/13/20  0820   * 124* 129*   K 3.4* 3.2* 2.6*   CL 92* 86* 85*   CO2 24 22 29   GLUCOSE 94 136* 129*   MG 2.30 1.80 1.80   BUN 68* 57* 53*   CREATININE 1.5* 1.4* 1.3*   LABGLOM 36* 39* 42*   GFRAA 43* 47* 51*           Assessment/Plan:    Acute Kidney Injury, resolved. - Suspect this was hemodynamic in nature. Low BP with intensification of diuretic regimen recently. - Urinalysis with no hematuria or proteinuria. Renal ultrasound with no sign of obstruction.  - Avoid hypotension.  - Please avoid any nephrotoxic agents such as NSAIDs or IV contrast unless deemed necessary.     CKD stage 3.  - From DM and HTN. - Baseline serum creatinine of 1.5-1.7mg/dL. - Has an upcoming appointment with Dr. Mayte Wright on 3/26/20 at 2:30PM.    Edema.  - Good diuresis overnight and weight has remained stable. - Continue Torsemide 100mg daily.  - Fluid and sodium restriction.  - Daily weights. Hypervolemic Hyponatremia.  - Improving with diuresis.     Anemia.  - Monitor Hgb closely. - PRN blood transfusion per primary service. Hypokalemia.  - Increase KCl to 40meq PO 4x/day, home dose. - Ordered IV replacement but patient unable to tolerate because of pain. - Hesitant to restart Spironolactone because of borderline low BP.     PVD.  - LLE angiogram postponed because of SHAUNNA. - Vascular surgery following.     CAD.  - S/P CABG on 1/27/20.     DM. - Per primary service.     BMP ordered for this afternoon.  If serum potassium is >

## 2020-03-13 NOTE — PLAN OF CARE
Problem: Pain:  Goal: Pain level will decrease  Description: Pain level will decrease  Outcome: Ongoing     Problem: Serum Glucose Level - Abnormal:  Goal: Ability to maintain appropriate glucose levels will improve  Description: Ability to maintain appropriate glucose levels will improve  3/12/2020 2151 by Rochelle Guardado RN  Outcome: Ongoing  3/12/2020 1252 by Dwain Garsia RN  Outcome: Ongoing     Problem: HEMODYNAMIC STATUS  Goal: Patient has stable vital signs and fluid balance  3/12/2020 1253 by Dwain Garsia RN  Outcome: Ongoing     Problem: OXYGENATION/RESPIRATORY FUNCTION  Goal: Patient will maintain patent airway  Outcome: Ongoing     Patient's EF (Ejection Fraction) is less than 40%    Heart Failure Medications:  Diuretics[de-identified] furosemide, torsemide    (One of the following REQUIRED for EF <40%/SYSTOLIC FAILURE but MAY be used in EF% >40%/DIASTOLIC FAILURE)        ACE[de-identified] None        ARB[de-identified] None         ARNI[de-identified] None    (Beta Blockers)  NON- Evidenced Based Beta Blocker (for EF% >40%/DIASTOLIC FAILURE): None    Evidenced Based Beta Blocker::(REQUIRED for EF% <40%/SYSTOLIC FAILURE) None  . .................................................................................................................................................. Patient's weights and intake/output reviewed: No    Patient's Last Weight: 249 lbs obtained by standing scale. Difference of 7 lbs more than last documented weight.       Intake/Output Summary (Last 24 hours) at 3/12/2020 2152  Last data filed at 3/12/2020 2139  Gross per 24 hour   Intake 1800 ml   Output 4200 ml   Net -2400 ml       Comorbidities Reviewed Yes    Patient has a past medical history of Anxiety and depression, Arthritis, CAD (coronary artery disease), Cardiomyopathy ([address] CHF (congestive heart failure) ([address] Chronic systolic heart failure ([address] COPD (chronic obstructive pulmonary disease) ([address] Diabetes mellitus ([address] GERD (gastroesophageal reflux disease), Hyperlipidemia, Hypertension, Hypotension, MI (myocardial infarction) ([address] Peripheral neuropathy, Peripheral vascular disease ([address] Pulmonary HTN ([address] Reflux, Sleep apnea, and Wears glasses. >>For CHF and Comorbidity documentation on Education Time and Topics, please see Education Tab    Progressive Mobility Assessment:  What is this patient's Current Level of Mobility?: Ambulatory-Up Ad Jasmin  How was this patient Mobilized today?: Edge of Bed, Up to Chair, Bedside Commode,  Up to Toilet/Shower, and Up in Room                 With Whom? Self                 Level of Difficulty/Assistance: Independent     Pt sitting in bed at this time on room air. Pt denies shortness of breath. Pt with nonpitting lower extremity edema.      Patient and/or Family's stated Goal of Care this Admission: increase activity tolerance prior to discharge        :

## 2020-03-13 NOTE — PROGRESS NOTES
Spoke with lab [place] [facility] calling with critical potassium 2.6. Will page Dr. Ash Lovett with results.

## 2020-03-13 NOTE — PROGRESS NOTES
Sent secure message to Dr. Bethany Arenas via perfect serve FYI labs result pt potassium 3.0, sodium 129. Is it okay to discharge? Thank you      Per Dr. Bethany Arenas okay to discharge give pt the 5 o'clock potassium and meds called to meds to beds.   Electronically signed by Marie Mata RN on 3/13/2020 at 4:35 PM

## 2020-03-13 NOTE — PROGRESS NOTES
Patient okay for discharge per MD. Discharge instructions and script given. IV removed and site assessment clean, dry, and intact. Telebox removed and [facility] notified. Patient discharged home in stable conditions with all belongings including cell phone. Lock box has been emptied. No questions or concerns at this time.  Patient escorted to personal car by wheelchair

## 2020-03-13 NOTE — PLAN OF CARE
Problem: HEMODYNAMIC STATUS  Goal: Patient has stable vital signs and fluid balance  Outcome: Ongoing     Problem: Serum Glucose Level - Abnormal:  Goal: Ability to maintain appropriate glucose levels will improve  Description: Ability to maintain appropriate glucose levels will improve  Outcome: Ongoing     Patient's EF (Ejection Fraction) is less than 40%    Heart Failure Medications:  Diuretics[de-identified] Torsemide    (One of the following REQUIRED for EF <40%/SYSTOLIC FAILURE but MAY be used in EF% >40%/DIASTOLIC FAILURE)        ACE[de-identified] None        ARB[de-identified] None         ARNI[de-identified] None    (Beta Blockers)  NON- Evidenced Based Beta Blocker (for EF% >40%/DIASTOLIC FAILURE): None    Evidenced Based Beta Blocker::(REQUIRED for EF% <40%/SYSTOLIC FAILURE) None  . .................................................................................................................................................. Patient's weights and intake/output reviewed: Yes    Patient's Last Weight: 248 lbs obtained by standing scale. Difference of 1 lbs less than last documented weight. Intake/Output Summary (Last 24 hours) at 3/13/2020 1248  Last data filed at 3/13/2020 1228  Gross per 24 hour   Intake 1490 ml   Output 7000 ml   Net -5510 ml       Comorbidities Reviewed Yes    Patient has a past medical history of Anxiety and depression, Arthritis, CAD (coronary artery disease), Cardiomyopathy ([address] CHF (congestive heart failure) ([address] Chronic systolic heart failure ([address] COPD (chronic obstructive pulmonary disease) ([address] Diabetes mellitus ([address] GERD (gastroesophageal reflux disease), Hyperlipidemia, Hypertension, Hypotension, MI (myocardial infarction) ([address] Peripheral neuropathy, Peripheral vascular disease ([address] Pulmonary HTN ([address] Reflux, Sleep apnea, and Wears glasses.      >>For CHF and Comorbidity documentation on Education Time and Topics, please see Education Tab    Progressive Mobility Assessment:  What is this patient's

## 2020-03-16 ENCOUNTER — TELEPHONE (OUTPATIENT)
Dept: ENDOCRINOLOGY | Age: 55
End: 2020-03-16

## 2020-03-16 NOTE — TELEPHONE ENCOUNTER
Patient is in need of a prescription refill for her amalog. She is out of her prescription and needs a refill.     Please send to  :  [address] [place] [address]      Pharmacy Comments: --

## 2020-03-17 ENCOUNTER — OFFICE VISIT (OUTPATIENT)
Dept: CARDIOTHORACIC SURGERY | Age: 55
End: 2020-03-17

## 2020-03-17 ENCOUNTER — TELEPHONE (OUTPATIENT)
Dept: CARDIOLOGY CLINIC | Age: 55
End: 2020-03-17

## 2020-03-17 VITALS
OXYGEN SATURATION: 97 % | HEART RATE: 84 BPM | WEIGHT: 257 LBS | BODY MASS INDEX: 48.52 KG/M2 | SYSTOLIC BLOOD PRESSURE: 86 MMHG | DIASTOLIC BLOOD PRESSURE: 50 MMHG | TEMPERATURE: 97.8 F | HEIGHT: 61 IN

## 2020-03-17 PROCEDURE — 99024 POSTOP FOLLOW-UP VISIT: CPT | Performed by: THORACIC SURGERY (CARDIOTHORACIC VASCULAR SURGERY)

## 2020-03-17 RX ORDER — INSULIN LISPRO 100 [IU]/ML
10 INJECTION, SOLUTION INTRAVENOUS; SUBCUTANEOUS 3 TIMES DAILY
Qty: 3 PEN | Refills: 2 | Status: SHIPPED | OUTPATIENT
Start: 2020-03-17 | End: 2020-05-20 | Stop reason: SDUPTHER

## 2020-03-17 NOTE — PROGRESS NOTES
Yes Historical Provider, MD   insulin lispro, 1 Unit Dial, 100 UNIT/ML SOPN Inject 10 Units into the skin 3 times daily  Patient not taking: Reported on 3/17/2020 3/17/20   TRAY Wells CNP        Allergies:     Allergies   Allergen Reactions    Lisinopril Other (See Comments)     Severe hypotension        Social History:    Working:   Caffeine:   Lifestyle:    Social History     Socioeconomic History    Marital status: Single     Spouse name: Not on file    Number of children: Not on file    Years of education: Not on file    Highest education level: Not on file   Occupational History    Not on file   Social Needs    Financial resource strain: Not on file    Food insecurity     Worry: Not on file     Inability: Not on file    Transportation needs     Medical: Not on file     Non-medical: Not on file   Tobacco Use    Smoking status: Current Some Day Smoker     Packs/day: 0.10     Years: 30.00     Pack years: 3.00     Types: Cigarettes     Last attempt to quit: 2019     Years since quittin.6    Smokeless tobacco: Never Used   Substance and Sexual Activity    Alcohol use: No    Drug use: Never    Sexual activity: Yes     Partners: Male   Lifestyle    Physical activity     Days per week: Not on file     Minutes per session: Not on file    Stress: Not on file   Relationships    Social connections     Talks on phone: Not on file     Gets together: Not on file     Attends Faith service: Not on file     Active member of club or organization: Not on file     Attends meetings of clubs or organizations: Not on file     Relationship status: Not on file    Intimate partner violence     Fear of current or ex partner: Not on file     Emotionally abused: Not on file     Physically abused: Not on file     Forced sexual activity: Not on file   Other Topics Concern    Not on file   Social History Narrative    Not on file       Family History:      Problem Relation Age of Onset    Heart Disease ulceration. No nodularity or induration. Neuro: CN grossly intact. Sensation and motor function grossly intact. Psychiatric: oriented, appropriate mood/affect. MEDICAL DECISION MAKING/TESTING  Studies personally reviewed. CT:  Narrative   EXAMINATION:   CT OF THE ABDOMEN AND PELVIS WITHOUT CONTRAST 3/11/2020 1:37 pm       TECHNIQUE:   CT of the abdomen and pelvis was performed without the administration of   intravenous contrast. Multiplanar reformatted images are provided for review. Dose modulation, iterative reconstruction, and/or weight based adjustment of   the mA/kV was utilized to reduce the radiation dose to as low as reasonably   achievable.       COMPARISON:   02/28/2020       HISTORY:   ORDERING SYSTEM PROVIDED HISTORY: Right flank pain, abdominal pain. Rule out   kidney stone   TECHNOLOGIST PROVIDED HISTORY:   Reason for exam:->Right flank pain, abdominal pain. Rule out kidney stone   Additional Contrast?->None   Reason for Exam: abd pain and lower back pain since Sun, no hx of kidney   stones   Acuity: Acute   Type of Exam: Initial   Relevant Medical/Surgical History: cholecystectomy       FINDINGS:   Lower Chest: Slight stranding in the lingula and right middle lobe similar to   the prior study.  Normal size of the heart.  Sternal wires present along with   mild stranding of the midline anterior soft tissues from recent CABG.       Organs: Liver is normal in appearance without worrisome focal lesion. Gallbladder surgically absent.  No hydronephrosis or perinephric stranding. Central calcifications in the left kidney favored to be of arterial origin. Possible very small nonobstructing calculi in the upper pole of the right   kidney.  Other abdominal organs appear normal.       GI/Bowel: Large amount of ingested food in the stomach.  Normal retrocecal   appendix.  Normal appearing terminal ileum.  No evident diverticular disease.    Moderate amount of stool.  Sutures present on the mid small bowel.       Pelvis: Uterus remains and appears normal for age. [place] Amass bladder appears   normal.  No mass, adenopathy, or ascites.       Peritoneum/Retroperitoneum: No mass, adenopathy, or ascites.  Normal size of   the aorta.  No free air.       Bones/Soft Tissues: No acute abnormality.  Prominent veins in the   subcutaneous fat of the anterior abdominal wall.  Some induration of the   subcutaneous fat presumably from subcutaneous injections.  Bilateral SFA   stents present.           Impression   No definite acute abnormality identified.  Tiny calcifications in the upper   right kidney probably of arterial origin although tiny nonobstructing calculi   possible.  No hydronephrosis or ureteral calculus.  Normal appendix.  No   significant abnormality identified.             Labs:   CBC:   Lab Results   Component Value Date    WBC 7.0 03/11/2020    HGB 9.8 03/11/2020    HCT 30.7 03/11/2020    MCV 86.9 03/11/2020     03/11/2020     BMP:   Lab Results   Component Value Date     03/13/2020    K 3.0 03/13/2020    CL 86 03/13/2020    CO2 28 03/13/2020    PHOS 3.9 02/13/2020    BUN 52 03/13/2020    CREATININE 1.3 03/13/2020    CALCIUM 8.8 03/13/2020    MG 1.70 03/13/2020     Cardiac Enzymes:   Lab Results   Component Value Date    TROPONINI 0.03 02/28/2020     PT/INR:   Lab Results   Component Value Date    PROTIME 12.7 03/10/2020    INR 1.09 03/10/2020     APTT:   Lab Results   Component Value Date    APTT 29.2 01/27/2020     Liver Profile:  Lab Results   Component Value Date    AST 38 03/10/2020    ALT 10 03/10/2020    BILIDIR <0.2 01/27/2020    BILITOT <0.2 03/10/2020    ALKPHOS 99 03/10/2020    LABALBU 3.8 03/10/2020     Lab Results   Component Value Date    CHOL 58 01/27/2020    HDL 21 01/27/2020    TRIG 78 01/27/2020     TSH:  Lab Results   Component Value Date    TSH 2.03 01/10/2020     HgbA1c:  Lab Results   Component Value Date    LABA1C 6.1 01/27/2020     UA:   Lab Results   Component Value Date    COLORU Straw 03/10/2020    PHUR 6.5 03/10/2020    WBCUA 3-5 09/28/2019    RBCUA None seen 09/28/2019    BACTERIA Rare 09/28/2019    CLARITYU Clear 03/10/2020    SPECGRAV 1.010 03/10/2020    LEUKOCYTESUR Negative 03/10/2020    UROBILINOGEN 0.2 03/10/2020    BILIRUBINUR Negative 03/10/2020    BLOODU Negative 03/10/2020    GLUCOSEU Negative 03/10/2020       Assessment and Plan:   Ms. Salma Ma is doing well post operatively after her coronary artery bypass grafting x 1 procedure on 1/27/20. She has a superficial wound infection for which she is getting local wound care, wound cultures, and antibiotics. No acute issues. She remains compliant wither medication, she is tolerating diet, ambulating without difficulty, and performing her independent lifestyle activities.          Juan Bustamante MD  3/17/2020  3:54 PM

## 2020-03-18 NOTE — TELEPHONE ENCOUNTER
Let's refer her back to the nephrologist who adjusted her diuretics in the hospital due to kidney failure. I agree with weighing herself daily. She also needs to try to restart transmitting her CardioMEMS.    Thank you, William Dent

## 2020-03-20 ENCOUNTER — APPOINTMENT (OUTPATIENT)
Dept: GENERAL RADIOLOGY | Age: 55
End: 2020-03-20
Payer: COMMERCIAL

## 2020-03-20 ENCOUNTER — HOSPITAL ENCOUNTER (EMERGENCY)
Age: 55
Discharge: ANOTHER HOSPITAL | End: 2020-03-20
Attending: EMERGENCY MEDICINE
Payer: COMMERCIAL

## 2020-03-20 ENCOUNTER — HOSPITAL ENCOUNTER (INPATIENT)
Age: 55
LOS: 13 days | Discharge: HOME | DRG: 194 | End: 2020-04-02
Attending: INTERNAL MEDICINE | Admitting: INTERNAL MEDICINE
Payer: COMMERCIAL

## 2020-03-20 ENCOUNTER — TELEPHONE (OUTPATIENT)
Dept: CARDIOLOGY CLINIC | Age: 55
End: 2020-03-20

## 2020-03-20 VITALS
HEART RATE: 64 BPM | TEMPERATURE: 98.5 F | BODY MASS INDEX: 44.82 KG/M2 | OXYGEN SATURATION: 98 % | DIASTOLIC BLOOD PRESSURE: 56 MMHG | SYSTOLIC BLOOD PRESSURE: 74 MMHG | RESPIRATION RATE: 22 BRPM | HEIGHT: 65 IN | WEIGHT: 269 LBS

## 2020-03-20 PROBLEM — I50.43 CHF (CONGESTIVE HEART FAILURE), NYHA CLASS I, ACUTE ON CHRONIC, COMBINED (HCC): Status: ACTIVE | Noted: 2020-03-20

## 2020-03-20 LAB
A/G RATIO: 0.9 (ref 1.1–2.2)
ALBUMIN SERPL-MCNC: 3.4 G/DL (ref 3.4–5)
ALP BLD-CCNC: 147 U/L (ref 40–129)
ALT SERPL-CCNC: 11 U/L (ref 10–40)
ANION GAP SERPL CALCULATED.3IONS-SCNC: 16 MMOL/L (ref 3–16)
AST SERPL-CCNC: 20 U/L (ref 15–37)
BASOPHILS ABSOLUTE: 0.1 K/UL (ref 0–0.2)
BASOPHILS RELATIVE PERCENT: 0.9 %
BILIRUB SERPL-MCNC: 0.4 MG/DL (ref 0–1)
BUN BLDV-MCNC: 90 MG/DL (ref 7–20)
CALCIUM SERPL-MCNC: 9.3 MG/DL (ref 8.3–10.6)
CHLORIDE BLD-SCNC: 91 MMOL/L (ref 99–110)
CO2: 23 MMOL/L (ref 21–32)
CREAT SERPL-MCNC: 3.5 MG/DL (ref 0.6–1.1)
EKG ATRIAL RATE: 76 BPM
EKG DIAGNOSIS: NORMAL
EKG P AXIS: 94 DEGREES
EKG P-R INTERVAL: 242 MS
EKG Q-T INTERVAL: 412 MS
EKG QRS DURATION: 110 MS
EKG QTC CALCULATION (BAZETT): 463 MS
EKG R AXIS: -75 DEGREES
EKG T AXIS: 89 DEGREES
EKG VENTRICULAR RATE: 76 BPM
EOSINOPHILS ABSOLUTE: 0.3 K/UL (ref 0–0.6)
EOSINOPHILS RELATIVE PERCENT: 2.2 %
GFR AFRICAN AMERICAN: 16
GFR NON-AFRICAN AMERICAN: 13
GLOBULIN: 3.7 G/DL
GLUCOSE BLD-MCNC: 105 MG/DL (ref 70–99)
GLUCOSE BLD-MCNC: 93 MG/DL (ref 70–99)
HCT VFR BLD CALC: 33 % (ref 36–48)
HEMOGLOBIN: 10.5 G/DL (ref 12–16)
LYMPHOCYTES ABSOLUTE: 1.2 K/UL (ref 1–5.1)
LYMPHOCYTES RELATIVE PERCENT: 10 %
MCH RBC QN AUTO: 27.4 PG (ref 26–34)
MCHC RBC AUTO-ENTMCNC: 31.7 G/DL (ref 31–36)
MCV RBC AUTO: 86.5 FL (ref 80–100)
MONOCYTES ABSOLUTE: 0.7 K/UL (ref 0–1.3)
MONOCYTES RELATIVE PERCENT: 5.4 %
NEUTROPHILS ABSOLUTE: 9.9 K/UL (ref 1.7–7.7)
NEUTROPHILS RELATIVE PERCENT: 81.5 %
PDW BLD-RTO: 18.3 % (ref 12.4–15.4)
PERFORMED ON: NORMAL
PLATELET # BLD: 401 K/UL (ref 135–450)
PMV BLD AUTO: 8.8 FL (ref 5–10.5)
POTASSIUM REFLEX MAGNESIUM: 5.6 MMOL/L (ref 3.5–5.1)
PRO-BNP: 3113 PG/ML (ref 0–124)
RBC # BLD: 3.81 M/UL (ref 4–5.2)
SODIUM BLD-SCNC: 130 MMOL/L (ref 136–145)
TOTAL PROTEIN: 7.1 G/DL (ref 6.4–8.2)
TROPONIN: 0.04 NG/ML
WBC # BLD: 12.2 K/UL (ref 4–11)

## 2020-03-20 PROCEDURE — 93010 ELECTROCARDIOGRAM REPORT: CPT | Performed by: INTERNAL MEDICINE

## 2020-03-20 PROCEDURE — 93005 ELECTROCARDIOGRAM TRACING: CPT | Performed by: EMERGENCY MEDICINE

## 2020-03-20 PROCEDURE — 6360000002 HC RX W HCPCS: Performed by: INTERNAL MEDICINE

## 2020-03-20 PROCEDURE — 36415 COLL VENOUS BLD VENIPUNCTURE: CPT

## 2020-03-20 PROCEDURE — 83880 ASSAY OF NATRIURETIC PEPTIDE: CPT

## 2020-03-20 PROCEDURE — 96360 HYDRATION IV INFUSION INIT: CPT

## 2020-03-20 PROCEDURE — 2580000003 HC RX 258: Performed by: INTERNAL MEDICINE

## 2020-03-20 PROCEDURE — 99291 CRITICAL CARE FIRST HOUR: CPT

## 2020-03-20 PROCEDURE — 80053 COMPREHEN METABOLIC PANEL: CPT

## 2020-03-20 PROCEDURE — 6370000000 HC RX 637 (ALT 250 FOR IP): Performed by: INTERNAL MEDICINE

## 2020-03-20 PROCEDURE — 84484 ASSAY OF TROPONIN QUANT: CPT

## 2020-03-20 PROCEDURE — 2060000000 HC ICU INTERMEDIATE R&B

## 2020-03-20 PROCEDURE — 51702 INSERT TEMP BLADDER CATH: CPT

## 2020-03-20 PROCEDURE — 2580000003 HC RX 258: Performed by: EMERGENCY MEDICINE

## 2020-03-20 PROCEDURE — 71046 X-RAY EXAM CHEST 2 VIEWS: CPT

## 2020-03-20 PROCEDURE — 83036 HEMOGLOBIN GLYCOSYLATED A1C: CPT

## 2020-03-20 PROCEDURE — 96361 HYDRATE IV INFUSION ADD-ON: CPT

## 2020-03-20 PROCEDURE — 2580000003 HC RX 258: Performed by: NURSE PRACTITIONER

## 2020-03-20 PROCEDURE — 85025 COMPLETE CBC W/AUTO DIFF WBC: CPT

## 2020-03-20 RX ORDER — DEXTROSE MONOHYDRATE 50 MG/ML
100 INJECTION, SOLUTION INTRAVENOUS PRN
Status: DISCONTINUED | OUTPATIENT
Start: 2020-03-20 | End: 2020-04-02 | Stop reason: HOSPADM

## 2020-03-20 RX ORDER — INSULIN GLARGINE 100 [IU]/ML
15 INJECTION, SOLUTION SUBCUTANEOUS ONCE
Status: COMPLETED | OUTPATIENT
Start: 2020-03-20 | End: 2020-03-20

## 2020-03-20 RX ORDER — FUROSEMIDE 10 MG/ML
60 INJECTION INTRAMUSCULAR; INTRAVENOUS ONCE
Status: COMPLETED | OUTPATIENT
Start: 2020-03-20 | End: 2020-03-20

## 2020-03-20 RX ORDER — ATORVASTATIN CALCIUM 80 MG/1
80 TABLET, FILM COATED ORAL NIGHTLY
Status: DISCONTINUED | OUTPATIENT
Start: 2020-03-20 | End: 2020-04-02 | Stop reason: HOSPADM

## 2020-03-20 RX ORDER — INSULIN GLARGINE 100 [IU]/ML
30 INJECTION, SOLUTION SUBCUTANEOUS NIGHTLY
Status: DISCONTINUED | OUTPATIENT
Start: 2020-03-20 | End: 2020-03-20

## 2020-03-20 RX ORDER — DEXTROSE MONOHYDRATE 25 G/50ML
12.5 INJECTION, SOLUTION INTRAVENOUS PRN
Status: DISCONTINUED | OUTPATIENT
Start: 2020-03-20 | End: 2020-04-02 | Stop reason: HOSPADM

## 2020-03-20 RX ORDER — 0.9 % SODIUM CHLORIDE 0.9 %
500 INTRAVENOUS SOLUTION INTRAVENOUS ONCE
Status: COMPLETED | OUTPATIENT
Start: 2020-03-20 | End: 2020-03-21

## 2020-03-20 RX ORDER — ACETAMINOPHEN 325 MG/1
650 TABLET ORAL EVERY 6 HOURS PRN
Status: DISCONTINUED | OUTPATIENT
Start: 2020-03-20 | End: 2020-04-02 | Stop reason: HOSPADM

## 2020-03-20 RX ORDER — NICOTINE POLACRILEX 4 MG
15 LOZENGE BUCCAL PRN
Status: DISCONTINUED | OUTPATIENT
Start: 2020-03-20 | End: 2020-04-02 | Stop reason: HOSPADM

## 2020-03-20 RX ORDER — MIDODRINE HYDROCHLORIDE 5 MG/1
10 TABLET ORAL
Status: DISCONTINUED | OUTPATIENT
Start: 2020-03-20 | End: 2020-04-01

## 2020-03-20 RX ORDER — BUSPIRONE HYDROCHLORIDE 5 MG/1
30 TABLET ORAL 2 TIMES DAILY
Status: DISCONTINUED | OUTPATIENT
Start: 2020-03-20 | End: 2020-04-02 | Stop reason: HOSPADM

## 2020-03-20 RX ORDER — SODIUM CHLORIDE 0.9 % (FLUSH) 0.9 %
10 SYRINGE (ML) INJECTION EVERY 12 HOURS SCHEDULED
Status: DISCONTINUED | OUTPATIENT
Start: 2020-03-20 | End: 2020-04-02 | Stop reason: HOSPADM

## 2020-03-20 RX ORDER — HEPARIN SODIUM 5000 [USP'U]/ML
5000 INJECTION, SOLUTION INTRAVENOUS; SUBCUTANEOUS EVERY 8 HOURS SCHEDULED
Status: DISCONTINUED | OUTPATIENT
Start: 2020-03-20 | End: 2020-04-02 | Stop reason: HOSPADM

## 2020-03-20 RX ORDER — ALBUTEROL SULFATE 2.5 MG/3ML
2.5 SOLUTION RESPIRATORY (INHALATION) EVERY 6 HOURS PRN
Status: DISCONTINUED | OUTPATIENT
Start: 2020-03-20 | End: 2020-04-02 | Stop reason: HOSPADM

## 2020-03-20 RX ORDER — ACETAMINOPHEN 650 MG/1
650 SUPPOSITORY RECTAL EVERY 6 HOURS PRN
Status: DISCONTINUED | OUTPATIENT
Start: 2020-03-20 | End: 2020-04-02 | Stop reason: HOSPADM

## 2020-03-20 RX ORDER — 0.9 % SODIUM CHLORIDE 0.9 %
500 INTRAVENOUS SOLUTION INTRAVENOUS ONCE
Status: DISCONTINUED | OUTPATIENT
Start: 2020-03-20 | End: 2020-03-20 | Stop reason: HOSPADM

## 2020-03-20 RX ORDER — BENZTROPINE MESYLATE 1 MG/1
1 TABLET ORAL NIGHTLY
Status: DISCONTINUED | OUTPATIENT
Start: 2020-03-20 | End: 2020-04-02 | Stop reason: HOSPADM

## 2020-03-20 RX ORDER — ARIPIPRAZOLE 10 MG/1
10 TABLET ORAL DAILY
Status: DISCONTINUED | OUTPATIENT
Start: 2020-03-20 | End: 2020-04-02 | Stop reason: HOSPADM

## 2020-03-20 RX ORDER — CLOPIDOGREL BISULFATE 75 MG/1
75 TABLET ORAL DAILY
Status: DISCONTINUED | OUTPATIENT
Start: 2020-03-20 | End: 2020-04-02 | Stop reason: HOSPADM

## 2020-03-20 RX ORDER — 0.9 % SODIUM CHLORIDE 0.9 %
500 INTRAVENOUS SOLUTION INTRAVENOUS ONCE
Status: COMPLETED | OUTPATIENT
Start: 2020-03-20 | End: 2020-03-20

## 2020-03-20 RX ORDER — ASPIRIN 81 MG/1
81 TABLET ORAL DAILY
Status: DISCONTINUED | OUTPATIENT
Start: 2020-03-20 | End: 2020-04-02 | Stop reason: HOSPADM

## 2020-03-20 RX ORDER — CYCLOBENZAPRINE HCL 10 MG
10 TABLET ORAL 3 TIMES DAILY PRN
Status: DISCONTINUED | OUTPATIENT
Start: 2020-03-20 | End: 2020-04-02 | Stop reason: HOSPADM

## 2020-03-20 RX ORDER — SODIUM CHLORIDE 0.9 % (FLUSH) 0.9 %
10 SYRINGE (ML) INJECTION PRN
Status: DISCONTINUED | OUTPATIENT
Start: 2020-03-20 | End: 2020-04-02 | Stop reason: HOSPADM

## 2020-03-20 RX ORDER — ONDANSETRON 2 MG/ML
4 INJECTION INTRAMUSCULAR; INTRAVENOUS EVERY 6 HOURS PRN
Status: DISCONTINUED | OUTPATIENT
Start: 2020-03-20 | End: 2020-04-02 | Stop reason: HOSPADM

## 2020-03-20 RX ORDER — PROMETHAZINE HYDROCHLORIDE 25 MG/1
12.5 TABLET ORAL EVERY 6 HOURS PRN
Status: DISCONTINUED | OUTPATIENT
Start: 2020-03-20 | End: 2020-04-02 | Stop reason: HOSPADM

## 2020-03-20 RX ORDER — MIDODRINE HYDROCHLORIDE 5 MG/1
10 TABLET ORAL
Status: DISCONTINUED | OUTPATIENT
Start: 2020-03-21 | End: 2020-03-20

## 2020-03-20 RX ORDER — BUPRENORPHINE AND NALOXONE 8; 2 MG/1; MG/1
1 FILM, SOLUBLE BUCCAL; SUBLINGUAL 2 TIMES DAILY
Status: DISCONTINUED | OUTPATIENT
Start: 2020-03-20 | End: 2020-04-02 | Stop reason: HOSPADM

## 2020-03-20 RX ORDER — SODIUM CHLORIDE 9 MG/ML
INJECTION, SOLUTION INTRAVENOUS CONTINUOUS
Status: DISCONTINUED | OUTPATIENT
Start: 2020-03-20 | End: 2020-03-20

## 2020-03-20 RX ORDER — SODIUM POLYSTYRENE SULFONATE 15 G/60ML
15 SUSPENSION ORAL; RECTAL ONCE
Status: COMPLETED | OUTPATIENT
Start: 2020-03-20 | End: 2020-03-20

## 2020-03-20 RX ORDER — PANTOPRAZOLE SODIUM 40 MG/1
40 TABLET, DELAYED RELEASE ORAL 2 TIMES DAILY
Status: DISCONTINUED | OUTPATIENT
Start: 2020-03-20 | End: 2020-04-02 | Stop reason: HOSPADM

## 2020-03-20 RX ADMIN — FUROSEMIDE 60 MG: 10 INJECTION, SOLUTION INTRAMUSCULAR; INTRAVENOUS at 21:13

## 2020-03-20 RX ADMIN — SODIUM POLYSTYRENE SULFONATE 15 G: 15 SUSPENSION ORAL; RECTAL at 21:14

## 2020-03-20 RX ADMIN — LAMOTRIGINE 150 MG: 100 TABLET ORAL at 21:13

## 2020-03-20 RX ADMIN — Medication 10 ML: at 21:14

## 2020-03-20 RX ADMIN — HEPARIN SODIUM 5000 UNITS: 5000 INJECTION INTRAVENOUS; SUBCUTANEOUS at 21:14

## 2020-03-20 RX ADMIN — MIDODRINE HYDROCHLORIDE 10 MG: 5 TABLET ORAL at 21:13

## 2020-03-20 RX ADMIN — BENZTROPINE MESYLATE 1 MG: 1 TABLET ORAL at 21:13

## 2020-03-20 RX ADMIN — INSULIN GLARGINE 15 UNITS: 100 INJECTION, SOLUTION SUBCUTANEOUS at 22:12

## 2020-03-20 RX ADMIN — BUPRENORPHINE AND NALOXONE 1 FILM: 8; 2 FILM BUCCAL; SUBLINGUAL at 21:15

## 2020-03-20 RX ADMIN — SODIUM CHLORIDE 500 ML: 9 INJECTION, SOLUTION INTRAVENOUS at 14:10

## 2020-03-20 RX ADMIN — PANTOPRAZOLE SODIUM 40 MG: 40 TABLET, DELAYED RELEASE ORAL at 21:13

## 2020-03-20 RX ADMIN — ATORVASTATIN CALCIUM 80 MG: 80 TABLET, FILM COATED ORAL at 21:13

## 2020-03-20 RX ADMIN — SODIUM CHLORIDE 500 ML: 9 INJECTION, SOLUTION INTRAVENOUS at 22:28

## 2020-03-20 RX ADMIN — BUSPIRONE HYDROCHLORIDE 30 MG: 5 TABLET ORAL at 21:13

## 2020-03-20 ASSESSMENT — ENCOUNTER SYMPTOMS
VOMITING: 0
TROUBLE SWALLOWING: 0
COLOR CHANGE: 0
ABDOMINAL PAIN: 0
WHEEZING: 0
FACIAL SWELLING: 0
NAUSEA: 0
VOICE CHANGE: 0
STRIDOR: 0
SHORTNESS OF BREATH: 1

## 2020-03-20 ASSESSMENT — PAIN SCALES - GENERAL: PAINLEVEL_OUTOF10: 0

## 2020-03-20 ASSESSMENT — PAIN SCALES - WONG BAKER: WONGBAKER_NUMERICALRESPONSE: 0

## 2020-03-20 NOTE — TELEPHONE ENCOUNTER
Pt sent MEMS  Transmission and wants Claire to look at. Pt is having weight gain. She usually weight between 230-240 and her weight is 269. She is having some fluttering, dizziness. Pt states her B/P is 70/50. Please advise.

## 2020-03-20 NOTE — ED NOTES
Spoke with Ashley at transfer center [facility] hospitalist denisse Evans  03/20/20 1414    Transfer center returned phone call w/ hospitalist - spoke to Dr. Dena Evans  03/20/20 8612

## 2020-03-20 NOTE — TELEPHONE ENCOUNTER
With the hypotension, dizziness and weight gain, I recommend evaluation in ED. I reviewed her CardioMEMS. Good transmission/ waveform. Her PAD is actually really good for her at 22. I would not increase the diuretics until check with nephrology but again with her hypotension, would seek emergency medical attention.    Thank you, Yogesh Pineda

## 2020-03-20 NOTE — ED NOTES
Attempted to call report. Receiving nurse not available. Call back number provided.      Trent Hernandez, RAYMUNDO  03/20/20 0910

## 2020-03-20 NOTE — ED PROVIDER NOTES
[facility]  eMERGENCY dEPARTMENT eNCOUnter      Pt Name: Jesusita Adame  MRN: [id number]  [place] 1965  Date of evaluation: 3/20/2020  Provider: Jason Johnson MD    [address] [place] [address]       Chief Complaint   Patient presents with    Hypotension     Arrives complaining of BP 70's/50's yesterday and today. c/o shortness of breath that is worse today and weight gain since last Friday. HISTORY OF PRESENT ILLNESS   (Location/Symptom, Timing/Onset, Context/Setting, Quality, Duration, Modifying Factors, Severity)  Note limiting factors. Jesusita Adame is a 62 y.o. female with hx of cardiorenal syndrome with recent admission to [facility] for acute kidney injury with discharge 1 week ago. The patient reports that ever since she was discharged she has had worsening shortness of breath and weight gain. She reports that she has discussed this with her primary care provider who recommended taking an additional dose of torsemide which she did yesterday. Reports by this her symptoms have worsened. She denies any chest pain. She denies any period of cough. She reports that she has shortness of breath which is moderate, constant, and worsening. She reports that lying flat does worsen this and sitting up improves it. She reports that she has gained water weight since her discharge. She reports her blood pressure has been running 70s over 50s for the past 2 days. She denies any lightheadedness or syncope. She denies any altered mental status. She denies any chest pain. HPI    Nursing Notes were reviewed. REVIEW OFSYSTEMS    (2-9 systems for level 4, 10 or more for level 5)     Review of Systems   Constitutional: Negative for appetite change, fever and unexpected weight change. HENT: Negative for facial swelling, trouble swallowing and voice change. Eyes: Negative for visual disturbance. Respiratory: Positive for shortness of breath.  Negative for wheezing and stridor. Cardiovascular: Positive for leg swelling. Negative for chest pain and palpitations. Gastrointestinal: Negative for abdominal pain, nausea and vomiting. Genitourinary: Negative for dysuria and vaginal bleeding. Musculoskeletal: Negative for neck pain and neck stiffness. Skin: Negative for color change and wound. Neurological: Negative for seizures and syncope. Psychiatric/Behavioral: Negative for self-injury and suicidal ideas. Except as noted above the remainder of the review of systems was reviewed and negative.        PAST MEDICAL HISTORY     Past Medical History:   Diagnosis Date    Anxiety and depression     Arthritis     CAD (coronary artery disease) 01/2018    Cardiomyopathy ([address]     [facility]est[facility]     Chronic systolic heart failure ([facility] 2/21/2020    COPD (chronic obstructive pulmonary disease) ([facility]     Diabetes mellitus ([facility]     GERD (gastroesophageal reflux disease)     Hyperlipidemia     Hypertension     Hypotension     MI (myocardial infarction) ([facility]     Peripheral neuropathy     Peripheral vascular disease ([facility]     Pulmonary HTN ([facility]     Reflux     Sleep apnea     has never done sleep study;does not use CPAP    Wears glasses     for reading         SURGICAL HISTORY       Past Surgical History:   Procedure Laterality Date    ARTERIAL BYPASS SURGRY Left 01/06/2016    Axillary/Subclavian to Brachial Bypass w/reversed SVG    CARDIAC CATHETERIZATION  03/27/2018    [facility]    Dr. Mat Tate      pancreatitis post surgery    CORONARY ANGIOPLASTY WITH STENT PLACEMENT  03/16/2018    MELODY- 3.5 x 38 and 3.5 x 20 to Cx    CORONARY ANGIOPLASTY WITH STENT PLACEMENT  01/03/2018    MELODY- 3.0 x 38 and 2.75 x 26 and 2.75 x 8 and 2.75 x 22 to the LAD    CORONARY ARTERY BYPASS GRAFT N/A 1/27/2020    CORONARY ARTERY BYPASS GRAFTING X 1, ON film Place 1 Film under the tongue 2 times daily. Historical Med      albuterol sulfate  (90 Base) MCG/ACT inhaler Inhale 2 puffs into the lungs every 6 hours as needed for Wheezing or Shortness of BreathHistorical Med      benztropine (COGENTIN) 1 MG tablet Take 1 mg by mouth nightly Historical Med      blood glucose test strips (EXACTECH TEST) strip Disp-200 strip, R-6, Normal6 times daily.       Insulin Pen Needle (B-D ULTRAFINE III SHORT PEN) 31G X 8 MM MISC Disp-200 each, R-2, PrintFive shots a day      INSULIN SYRINGE .5CC/29G 29G X 1/2\" 0.5 ML MISC DAILY Starting Tue 12/3/2019, Disp-100 each, R-3, Normal      Liraglutide (VICTOZA) 18 MG/3ML SOPN SC injection Inject 1.2 mg into the skin daily, Disp-2 pen, R-3Normal      vitamin D (ERGOCALCIFEROL) 1.25 MG (47503 UT) CAPS capsule Take 1 capsule by mouth once a week, Disp-5 capsule, R-5Normal      pantoprazole (PROTONIX) 40 MG tablet Take 1 tablet by mouth 2 times daily, Disp-60 tablet, R-0NO PRINT      clopidogrel (PLAVIX) 75 MG tablet TAKE 1 TABLET BY MOUTH EVERY DAY, Disp-30 tablet, R-5Normal      magnesium oxide (MAG-OX) 400 (240 Mg) MG tablet TAKE 1 TABLET ONCE DAILY, Disp-30 tablet, R-11Normal      busPIRone (BUSPAR) 30 MG tablet Take 30 mg by mouth 2 times daily Historical Med      aspirin EC 81 MG EC tablet Take 1 tablet by mouth daily, Disp-30 tablet, R-3      ARIPiprazole (ABILIFY) 10 MG tablet Take 10 mg by mouth daily Historical Med      lamoTRIgine (LAMICTAL) 150 MG tablet Take 150 mg by mouth 2 times daily Historical Med             ALLERGIES     Lisinopril    FAMILY HISTORY       Family History   Problem Relation Age of Onset    Heart Disease Maternal Grandmother     Cancer Mother         lung and brain cancer    Cancer Maternal Aunt         lung and brain cancer          SOCIAL HISTORY       Social History     Socioeconomic History    Marital status: Single     Spouse name: None    Number of children: None    Years of education: None No respiratory distress. Breath sounds: Normal breath sounds. No wheezing. Abdominal:      General: There is no distension. Palpations: Abdomen is soft. Tenderness: There is no abdominal tenderness. There is no guarding or rebound. Musculoskeletal: Normal range of motion. General: No tenderness or deformity. Skin:     General: Skin is warm and dry. Neurological:      Mental Status: She is alert. Cranial Nerves: No cranial nerve deficit. Comments: Normal speech. Normal status. GCS of 15. Normal gait on own power. DIAGNOSTIC RESULTS     EKG:All EKG's are interpreted by the Emergency Department Physician who either signs or Co-signs this chart in the absence of a cardiologist.    The Ekg interpreted by me shows  Sinus rhythm with first-degree AV block with a rate of 76  Left axis deviation  QTc is  463ms  Intervals and Durations are unremarkable. ST Segments: no acute change  No significant change from prior EKG dated 2/28/20      RADIOLOGY:     Interpretation per the Radiologist below, if available at the time of this note:    XR CHEST STANDARD (2 VW)   Final Result   Stable chest x-ray with no acute disease. Status post thoracotomy with CABG   changes. Stable cardiomegaly. No evidence of CHF or pneumonia. LABS:  Labs Reviewed   BRAIN NATRIURETIC PEPTIDE - Abnormal; Notable for the following components:       Result Value    Pro-BNP 3,113 (*)     All other components within normal limits    Narrative:     [facility] tel. [phone],  Chemistry results called to and read back by Alhaji Gale, 03/20/2020  13:59, by Gerald Silvestre  Performed at:  [facility]  [address] [place] [address].  [place], [facility]   Phone [phone]   CBC WITH AUTO DIFFERENTIAL - Abnormal; Notable for the following components:    WBC 12.2 (*)     RBC 3.81 (*)     Hemoglobin 10.5 (*)     Hematocrit 33.0 (*)     RDW 18.3 (*) Neutrophils Absolute 9.9 (*)     All other components within normal limits    Narrative:     Performed at:  [facility]. [facility] [place] [facility]  [address] [place] [address]. [facility], [address] [place] [address]   Phone [phone]   TROPONIN - Abnormal; Notable for the following components:    Troponin 0.04 (*)     All other components within normal limits    Narrative:     Performed at:  [facility]. [facility] [place] [facility]  [address] [place] [address]. [facility], [facility]   Phone [phone]   COMPREHENSIVE METABOLIC PANEL W/ REFLEX TO MG FOR LOW K - Abnormal; Notable for the following components:    Sodium 130 (*)     Potassium reflex Magnesium 5.6 (*)     Chloride 91 (*)     Glucose 105 (*)     BUN 90 (*)     CREATININE 3.5 (*)     GFR Non- 13 (*)     GFR African American 16 (*)     Albumin/Globulin Ratio 0.9 (*)     Alkaline Phosphatase 147 (*)     All other components within normal limits    Narrative:     CALL  [facility] tel. [phone],  Chemistry results called to and read back by Gisela Garcia, 03/20/2020  13:59, by Jhon Caceres  Performed at:  [facility]. [facility] [place] [facility]  [address] [place] [address]. [facility], [address] [place] [address]   Phone [phone]       All otherlabs were within normal range or not returned as of this dictation. EMERGENCY DEPARTMENT COURSE and DIFFERENTIAL DIAGNOSIS/MDM:   Vitals:    Vitals:    03/20/20 1526 03/20/20 1545 03/20/20 1600 03/20/20 1636   BP: 101/69 82/62 107/77 (!) 74/56   Pulse: 76 76 87 64   Resp: 19 23 28 22   Temp:    98.5 °F (36.9 °C)   TempSrc:    Oral   SpO2: 98% 98%  98%   Weight:       Height:             MDM  Laboratory evaluation is concerning for acute kidney injury with creatinine of 3.5 and BUN of 90. Troponin is also mildly elevated at 0.04. Patient denies any chest pain does not have any signs of acute ischemia on EKG therefore I do not suspect ACS at this time. BNP is elevated to 3,113.   Chest x-ray is stable with evidence of cardiomegaly but does not show any evidence of acute CHF or pneumonia. Based on patient's hypotension, acute kidney injury, and diuretics suspect likely prerenal and intrarenal injury. I feel the patient is appropriate for IV fluid rehydration and admission. While she is borderline hypotensive she has normal mental status and is hemodynamically stable at this time. She is given gentle fluid boluses based on her CHF history. I do not suspect sepsis at this time. Based on her vitals and signs of endorgan damage the patient is reevaluated multiple times by her stay in the emergency part with no acute worsening in clinical status. She is admitted to [facility] for further care. The patient and her  expressed understanding and agreement with this plan. CONSULTS:  IP CONSULT TO HOSPITALIST    PROCEDURES:  Unless otherwise noted below, none     Critical Care  Performed by: Meño Douglas MD  Authorized by: Meño Douglas MD     Critical care provider statement:     Critical care time (minutes):  35    Critical care time was exclusive of:  Separately billable procedures and treating other patients and teaching time    Critical care was necessary to treat or prevent imminent or life-threatening deterioration of the following conditions:  Renal failure, circulatory failure and shock    Critical care was time spent personally by me on the following activities:  Ordering and performing treatments and interventions, development of treatment plan with patient or surrogate, ordering and review of laboratory studies, discussions with consultants, evaluation of patient's response to treatment, re-evaluation of patient's condition, examination of patient, review of old charts and obtaining history from patient or surrogate        FINAL IMPRESSION      1. SHAUNNA (acute kidney injury) ([facility]    2.  Congestive heart failure, unspecified HF chronicity, unspecified heart failure type ([facility] 3. Elevated troponin I level    4. Hypotension, unspecified hypotension type          DISPOSITION/PLAN   DISPOSITION Decision To Transfer 03/20/2020 02:55:39 PM        (Please note that portions of this note were completed with a voice recognition program.  Efforts were made to edit the dictations but occasionally words aremis-transcribed. )    Rock Collazo MD (electronically signed)  Attending Emergency Physician           Rock Collazo MD  03/20/20 2000

## 2020-03-21 ENCOUNTER — APPOINTMENT (OUTPATIENT)
Dept: CT IMAGING | Age: 55
DRG: 194 | End: 2020-03-21
Attending: INTERNAL MEDICINE
Payer: COMMERCIAL

## 2020-03-21 LAB
ALBUMIN SERPL-MCNC: 2.9 G/DL (ref 3.4–5)
ANION GAP SERPL CALCULATED.3IONS-SCNC: 14 MMOL/L (ref 3–16)
BUN BLDV-MCNC: 92 MG/DL (ref 7–20)
CALCIUM SERPL-MCNC: 8.6 MG/DL (ref 8.3–10.6)
CHLORIDE BLD-SCNC: 91 MMOL/L (ref 99–110)
CO2: 24 MMOL/L (ref 21–32)
CREAT SERPL-MCNC: 3.3 MG/DL (ref 0.6–1.1)
ESTIMATED AVERAGE GLUCOSE: 119.8 MG/DL
ESTIMATED AVERAGE GLUCOSE: 119.8 MG/DL
GFR AFRICAN AMERICAN: 17
GFR NON-AFRICAN AMERICAN: 14
GLUCOSE BLD-MCNC: 112 MG/DL (ref 70–99)
GLUCOSE BLD-MCNC: 121 MG/DL (ref 70–99)
GLUCOSE BLD-MCNC: 148 MG/DL (ref 70–99)
GLUCOSE BLD-MCNC: 148 MG/DL (ref 70–99)
GLUCOSE BLD-MCNC: 159 MG/DL (ref 70–99)
HBA1C MFR BLD: 5.8 %
HBA1C MFR BLD: 5.8 %
HCT VFR BLD CALC: 30.6 % (ref 36–48)
HEMOGLOBIN: 9.8 G/DL (ref 12–16)
MCH RBC QN AUTO: 27.7 PG (ref 26–34)
MCHC RBC AUTO-ENTMCNC: 31.9 G/DL (ref 31–36)
MCV RBC AUTO: 86.8 FL (ref 80–100)
PDW BLD-RTO: 18.4 % (ref 12.4–15.4)
PERFORMED ON: ABNORMAL
PHOSPHORUS: 6.4 MG/DL (ref 2.5–4.9)
PLATELET # BLD: 340 K/UL (ref 135–450)
PLATELET SLIDE REVIEW: ADEQUATE
PMV BLD AUTO: 9 FL (ref 5–10.5)
POTASSIUM SERPL-SCNC: 4.9 MMOL/L (ref 3.5–5.1)
PROCALCITONIN: 0.14 NG/ML (ref 0–0.15)
RBC # BLD: 3.52 M/UL (ref 4–5.2)
SLIDE REVIEW: ABNORMAL
SODIUM BLD-SCNC: 129 MMOL/L (ref 136–145)
WBC # BLD: 14.6 K/UL (ref 4–11)

## 2020-03-21 PROCEDURE — 80069 RENAL FUNCTION PANEL: CPT

## 2020-03-21 PROCEDURE — 2060000000 HC ICU INTERMEDIATE R&B

## 2020-03-21 PROCEDURE — 71250 CT THORAX DX C-: CPT

## 2020-03-21 PROCEDURE — 99255 IP/OBS CONSLTJ NEW/EST HI 80: CPT | Performed by: INTERNAL MEDICINE

## 2020-03-21 PROCEDURE — P9047 ALBUMIN (HUMAN), 25%, 50ML: HCPCS | Performed by: INTERNAL MEDICINE

## 2020-03-21 PROCEDURE — 87070 CULTURE OTHR SPECIMN AEROBIC: CPT

## 2020-03-21 PROCEDURE — 94640 AIRWAY INHALATION TREATMENT: CPT

## 2020-03-21 PROCEDURE — 99255 IP/OBS CONSLTJ NEW/EST HI 80: CPT | Performed by: THORACIC SURGERY (CARDIOTHORACIC VASCULAR SURGERY)

## 2020-03-21 PROCEDURE — 85027 COMPLETE CBC AUTOMATED: CPT

## 2020-03-21 PROCEDURE — 6360000002 HC RX W HCPCS: Performed by: INTERNAL MEDICINE

## 2020-03-21 PROCEDURE — 2580000003 HC RX 258: Performed by: INTERNAL MEDICINE

## 2020-03-21 PROCEDURE — 6370000000 HC RX 637 (ALT 250 FOR IP): Performed by: INTERNAL MEDICINE

## 2020-03-21 PROCEDURE — 6370000000 HC RX 637 (ALT 250 FOR IP): Performed by: THORACIC SURGERY (CARDIOTHORACIC VASCULAR SURGERY)

## 2020-03-21 PROCEDURE — 87205 SMEAR GRAM STAIN: CPT

## 2020-03-21 PROCEDURE — 84145 PROCALCITONIN (PCT): CPT

## 2020-03-21 PROCEDURE — 36415 COLL VENOUS BLD VENIPUNCTURE: CPT

## 2020-03-21 RX ORDER — ALBUMIN (HUMAN) 12.5 G/50ML
25 SOLUTION INTRAVENOUS EVERY 8 HOURS
Status: COMPLETED | OUTPATIENT
Start: 2020-03-21 | End: 2020-03-22

## 2020-03-21 RX ORDER — AMOXICILLIN AND CLAVULANATE POTASSIUM 875; 125 MG/1; MG/1
1 TABLET, FILM COATED ORAL EVERY 12 HOURS SCHEDULED
Status: COMPLETED | OUTPATIENT
Start: 2020-03-21 | End: 2020-03-28

## 2020-03-21 RX ORDER — OXYCODONE HYDROCHLORIDE AND ACETAMINOPHEN 5; 325 MG/1; MG/1
1 TABLET ORAL EVERY 4 HOURS PRN
Status: DISCONTINUED | OUTPATIENT
Start: 2020-03-21 | End: 2020-04-02 | Stop reason: HOSPADM

## 2020-03-21 RX ORDER — POLYETHYLENE GLYCOL 3350 17 G/17G
17 POWDER, FOR SOLUTION ORAL DAILY
Status: DISCONTINUED | OUTPATIENT
Start: 2020-03-21 | End: 2020-04-02 | Stop reason: HOSPADM

## 2020-03-21 RX ADMIN — BUSPIRONE HYDROCHLORIDE 30 MG: 5 TABLET ORAL at 09:34

## 2020-03-21 RX ADMIN — Medication 10 ML: at 20:25

## 2020-03-21 RX ADMIN — PANTOPRAZOLE SODIUM 40 MG: 40 TABLET, DELAYED RELEASE ORAL at 09:33

## 2020-03-21 RX ADMIN — MIDODRINE HYDROCHLORIDE 10 MG: 5 TABLET ORAL at 20:29

## 2020-03-21 RX ADMIN — OXYCODONE HYDROCHLORIDE AND ACETAMINOPHEN 1 TABLET: 5; 325 TABLET ORAL at 20:25

## 2020-03-21 RX ADMIN — CLOPIDOGREL BISULFATE 75 MG: 75 TABLET ORAL at 09:33

## 2020-03-21 RX ADMIN — AMOXICILLIN AND CLAVULANATE POTASSIUM 1 TABLET: 875; 125 TABLET, FILM COATED ORAL at 20:24

## 2020-03-21 RX ADMIN — HEPARIN SODIUM 5000 UNITS: 5000 INJECTION INTRAVENOUS; SUBCUTANEOUS at 13:45

## 2020-03-21 RX ADMIN — Medication 10 ML: at 14:32

## 2020-03-21 RX ADMIN — HEPARIN SODIUM 5000 UNITS: 5000 INJECTION INTRAVENOUS; SUBCUTANEOUS at 22:40

## 2020-03-21 RX ADMIN — ALBUMIN (HUMAN) 25 G: 0.25 INJECTION, SOLUTION INTRAVENOUS at 14:23

## 2020-03-21 RX ADMIN — POLYETHYLENE GLYCOL 3350 17 G: 17 POWDER, FOR SOLUTION ORAL at 11:04

## 2020-03-21 RX ADMIN — ASPIRIN 81 MG: 81 TABLET, COATED ORAL at 09:32

## 2020-03-21 RX ADMIN — TIOTROPIUM BROMIDE INHALATION SPRAY 2 PUFF: 3.12 SPRAY, METERED RESPIRATORY (INHALATION) at 07:31

## 2020-03-21 RX ADMIN — INSULIN LISPRO 8 UNITS: 100 INJECTION, SOLUTION INTRAVENOUS; SUBCUTANEOUS at 16:30

## 2020-03-21 RX ADMIN — PANTOPRAZOLE SODIUM 40 MG: 40 TABLET, DELAYED RELEASE ORAL at 16:33

## 2020-03-21 RX ADMIN — MIDODRINE HYDROCHLORIDE 10 MG: 5 TABLET ORAL at 13:45

## 2020-03-21 RX ADMIN — ATORVASTATIN CALCIUM 80 MG: 80 TABLET, FILM COATED ORAL at 20:25

## 2020-03-21 RX ADMIN — MIDODRINE HYDROCHLORIDE 10 MG: 5 TABLET ORAL at 09:32

## 2020-03-21 RX ADMIN — BUPRENORPHINE AND NALOXONE 1 FILM: 8; 2 FILM BUCCAL; SUBLINGUAL at 20:25

## 2020-03-21 RX ADMIN — INSULIN LISPRO 2 UNITS: 100 INJECTION, SOLUTION INTRAVENOUS; SUBCUTANEOUS at 16:27

## 2020-03-21 RX ADMIN — Medication 10 ML: at 10:55

## 2020-03-21 RX ADMIN — LAMOTRIGINE 150 MG: 100 TABLET ORAL at 20:24

## 2020-03-21 RX ADMIN — BUSPIRONE HYDROCHLORIDE 30 MG: 5 TABLET ORAL at 20:25

## 2020-03-21 RX ADMIN — ALBUMIN (HUMAN) 25 G: 0.25 INJECTION, SOLUTION INTRAVENOUS at 20:30

## 2020-03-21 RX ADMIN — BENZTROPINE MESYLATE 1 MG: 1 TABLET ORAL at 20:25

## 2020-03-21 RX ADMIN — ARIPIPRAZOLE 10 MG: 10 TABLET ORAL at 20:24

## 2020-03-21 RX ADMIN — HEPARIN SODIUM 5000 UNITS: 5000 INJECTION INTRAVENOUS; SUBCUTANEOUS at 06:10

## 2020-03-21 RX ADMIN — LAMOTRIGINE 150 MG: 100 TABLET ORAL at 09:33

## 2020-03-21 RX ADMIN — BUPRENORPHINE AND NALOXONE 1 FILM: 8; 2 FILM BUCCAL; SUBLINGUAL at 10:42

## 2020-03-21 RX ADMIN — INSULIN LISPRO 2 UNITS: 100 INJECTION, SOLUTION INTRAVENOUS; SUBCUTANEOUS at 12:06

## 2020-03-21 ASSESSMENT — PAIN SCALES - GENERAL
PAINLEVEL_OUTOF10: 5
PAINLEVEL_OUTOF10: 6
PAINLEVEL_OUTOF10: 0
PAINLEVEL_OUTOF10: 5

## 2020-03-21 ASSESSMENT — PAIN DESCRIPTION - LOCATION
LOCATION: ABDOMEN
LOCATION: BUTTOCKS

## 2020-03-21 ASSESSMENT — PAIN DESCRIPTION - PAIN TYPE: TYPE: ACUTE PAIN

## 2020-03-21 ASSESSMENT — PAIN DESCRIPTION - ORIENTATION: ORIENTATION: LEFT

## 2020-03-21 NOTE — PROGRESS NOTES
Notified Cardiothoracic Surgery's answering service, MARCIO, @ [phone] 3/21/20 re: cardiothoracic surgery consult. -[phone]

## 2020-03-21 NOTE — PLAN OF CARE
CHF (congestive heart failure) (HCC), Chronic systolic heart failure ([facility] COPD (chronic obstructive pulmonary disease) ([facility] Diabetes mellitus ([facility] [facility] GERD (gastroesophageal reflux disease), Hyperlipidemia, Hypertension, Hypotension, MI (myocardial infarction) ([facility] [facility] Peripheral neuropathy, Peripheral vascular disease ([facility] [facility] Pulmonary HTN ([facility] [facility] Reflux, Sleep apnea, and Wears glasses. >>For CHF and Comorbidity documentation on Education Time and Topics, please see Education Tab    Progressive Mobility Assessment:  What is this patient's Current Level of Mobility?: Requires Bed Rest at times d/t BP  How was this patient Mobilized today?: Edge of Bed and Bedside Commode                 With Whom? Nurse and PCA                 Level of Difficulty/Assistance: 1x Assist     Pt resting in bed at this time on room air. Pt denies shortness of breath. Pt with pitting lower extremity edema. Patient and/or Family's stated Goal of Care this Admission: reduce shortness of breath, increase activity tolerance, better understand heart failure and disease management, be more comfortable, and reduce lower extremity edema prior to discharge        :       Problem: FLUID AND ELECTROLYTE IMBALANCE  Goal: Fluid and electrolyte balance are achieved/maintained  3/21/2020 1734 by Edu Fatima RN  Outcome: Ongoing       Problem: ACTIVITY INTOLERANCE/IMPAIRED MOBILITY  Goal: Mobility/activity is maintained at optimum level for patient  3/21/2020 1734 by Edu Fatima RN  Outcome: Ongoing    Problem:  Activity:  Goal: Risk for activity intolerance will decrease  Description: Risk for activity intolerance will decrease  3/21/2020 1734 by Edu Fatima RN  Outcome: Ongoing       Problem: Coping:  Goal: Ability to adjust to condition or change in health will improve  Description: Ability to adjust to condition or change in health will improve  3/21/2020 1734 by Edu Fatima RN  Outcome: Ongoing       Problem: Fluid Volume:  Goal: Ability to maintain a balanced intake and output will improve  Description: Ability to maintain a balanced intake and output will improve  3/21/2020 1734 by Hadley Dukes RN  Outcome: Ongoing       Problem: Health Behavior:  Goal: Ability to identify and utilize available resources and services will improve  Description: Ability to identify and utilize available resources and services will improve  3/21/2020 1734 by Hadley Dukes RN  Outcome: Ongoing    Goal: Ability to manage health-related needs will improve  Description: Ability to manage health-related needs will improve  3/21/2020 1734 by Hadley Dukes RN  Outcome: Ongoing       Problem: Metabolic:  Goal: Ability to maintain appropriate glucose levels will improve  Description: Ability to maintain appropriate glucose levels will improve  3/21/2020 1734 by Hadley Dukes RN  Outcome: Ongoing       Problem: Nutritional:  Goal: Maintenance of adequate nutrition will improve  Description: Maintenance of adequate nutrition will improve  3/21/2020 1734 by Hadley Dukes RN  Outcome: Ongoing    Goal: Progress toward achieving an optimal weight will improve  Description: Progress toward achieving an optimal weight will improve  3/21/2020 1734 by Hadley Dukes RN  Outcome: Ongoing       Problem: Physical Regulation:  Goal: Complications related to the disease process, condition or treatment will be avoided or minimized  Description: Complications related to the disease process, condition or treatment will be avoided or minimized  3/21/2020 1734 by Hadley Dukes RN  Outcome: Ongoing    Goal: Diagnostic test results will improve  Description: Diagnostic test results will improve  3/21/2020 1734 by Hadley Dukes RN  Outcome: Ongoing    Problem: Skin Integrity:  Goal: Risk for impaired skin integrity will decrease  Description: Risk for impaired skin integrity will decrease  3/21/2020 1734 by Hadley Dukes RN  Outcome: Ongoing       Problem: Tissue Perfusion:  Goal:

## 2020-03-21 NOTE — PROGRESS NOTES
Hospitalist Progress Note      PCP: Saul Harrell PA-C    Date of Admission: 3/20/2020    Chief Complaint: hypotension, weight gain    Hospital Course:   62 y.o. female who presented to [facility] with hypotension and weight gain. Patient was just discharged last week for SHAUNNA and CHF exacerbation. Since her discharge, she has had persistent weight gain and increasing LE edema. She has also had persistent BP in the 56U-47O systolic. This is common for her and she has never had symptoms associated with BP in the this range before. She has been in contact with her cardiology team since her discharge and increased her home diuretic but it had no effect on her weight gain. Over the past couple days, she has had increased HIGHTOWER as well which is typical for her when she gets volume overloaded. She denies fevers, chills, chest pain, nausea, vomiting or diarrhea. Subjective: Still having increased SOB. No improvement in LE edema.        Medications:  Reviewed    Infusion Medications    dextrose       Scheduled Medications    polyethylene glycol  17 g Oral Daily    ARIPiprazole  10 mg Oral Daily    aspirin EC  81 mg Oral Daily    atorvastatin  80 mg Oral Nightly    benztropine  1 mg Oral Nightly    buprenorphine-naloxone  1 Film Sublingual BID    busPIRone  30 mg Oral BID    clopidogrel  75 mg Oral Daily    lamoTRIgine  150 mg Oral BID    pantoprazole  40 mg Oral BID    tiotropium  2 puff Inhalation Daily    sodium chloride flush  10 mL Intravenous 2 times per day    insulin lispro  8 Units Subcutaneous TID WC    insulin lispro  0-12 Units Subcutaneous TID WC    insulin lispro  0-6 Units Subcutaneous Nightly    heparin (porcine)  5,000 Units Subcutaneous 3 times per day    midodrine  10 mg Oral TID WC     PRN Meds: cyclobenzaprine, sodium chloride flush, acetaminophen **OR** acetaminophen, promethazine **OR** ondansetron, glucose, dextrose, glucagon (rDNA), dextrose, albuterol      Intake/Output

## 2020-03-21 NOTE — PROGRESS NOTES
Dr. Matt Valadez at bedside speaking with pt. See new orders. Discussed with MD regarding meds and orders. Even with pt being hypotensive, MD okay with pt receiving lasix and midodrine. Will continue to monitor.

## 2020-03-21 NOTE — H&P
Quin Terry MD   torsemide [facility]) 100 MG tablet Take 1 tablet by mouth daily 3/4/20   TRAY Granado CNP   atorvastatin (LIPITOR) 80 MG tablet Take 1 tablet by mouth nightly 2/28/20   TRAY Granado CNP   tiotropium (SPIRIVA RESPIMAT) 2.5 MCG/ACT AERS inhaler INHALE 2 PUFFS INTO THE LUNGS DAILY 2/25/20   Debby Gar MD   potassium chloride (KLOR-CON M) 20 MEQ extended release tablet Take 40 mEq by mouth 4 times daily     Historical Provider, MD   buprenorphine-naloxone (SUBOXONE) 8-2 MG FILM SL film Place 1 Film under the tongue 2 times daily. Historical Provider, MD   albuterol sulfate  (90 Base) MCG/ACT inhaler Inhale 2 puffs into the lungs every 6 hours as needed for Wheezing or Shortness of Breath    Historical Provider, MD   benztropine (COGENTIN) 1 MG tablet Take 1 mg by mouth nightly  12/13/19   Historical Provider, MD   blood glucose test strips (EXACTECH TEST) strip 6 times daily.  12/19/19   TRAY Jones CNP   Insulin Pen Needle (B-D ULTRAFINE III SHORT PEN) 31G X 8 MM MISC Five shots a day 12/3/19   TRAY Jones CNP   INSULIN SYRINGE .5CC/29G 29G X 1/2\" 0.5 ML MISC 1 each by Does not apply route daily 12/3/19   TRAY Jones CNP   Liraglutide (VICTOZA) 18 MG/3ML SOPN SC injection Inject 1.2 mg into the skin daily 10/29/19   TRAY Jones CNP   vitamin D (ERGOCALCIFEROL) 1.25 MG (68562 UT) CAPS capsule Take 1 capsule by mouth once a week 10/29/19   TRAY Jones CNP   pantoprazole (PROTONIX) 40 MG tablet Take 1 tablet by mouth 2 times daily 10/1/19   Isidro Carvajal MD   clopidogrel (PLAVIX) 75 MG tablet TAKE 1 TABLET BY MOUTH EVERY DAY 9/3/19   TRAY Granado CNP   magnesium oxide (MAG-OX) 400 (240 Mg) MG tablet TAKE 1 TABLET ONCE DAILY 5/1/19   Chau Ga MD   busPIRone (BUSPAR) 30 MG tablet Take 30 mg by mouth 2 times daily  4/2/18   Historical Provider, MD   aspirin EC 81 MG EC tablet Take 1 tablet by mouth daily without deformity. Skin: Skin color, texture, turgor normal.  No rashes or lesions. Neurologic:  Neurovascularly intact without any focal sensory/motor deficits. Cranial nerves: II-XII intact, grossly non-focal.  Psychiatric:  Alert and oriented, thought content appropriate, normal insight  Capillary Refill: Brisk,< 3 seconds   Peripheral Pulses: +2 palpable, equal bilaterally       Labs:     Recent Labs     03/20/20  1315   WBC 12.2*   HGB 10.5*   HCT 33.0*        Recent Labs     03/20/20  1315   *   K 5.6*   CL 91*   CO2 23   BUN 90*   CREATININE 3.5*   CALCIUM 9.3     Recent Labs     03/20/20  1315   AST 20   ALT 11   BILITOT 0.4   ALKPHOS 147*     No results for input(s): INR in the last 72 hours.   Recent Labs     03/20/20  1315   TROPONINI 0.04*       Urinalysis:      Lab Results   Component Value Date    NITRU Negative 03/10/2020    WBCUA 3-5 09/28/2019    BACTERIA Rare 09/28/2019    RBCUA None seen 09/28/2019    BLOODU Negative 03/10/2020    SPECGRAV 1.010 03/10/2020    GLUCOSEU Negative 03/10/2020       Radiology:     CXR: I have reviewed the CXR with the following interpretation: cardiomegaly  EKG:  I have reviewed the EKG with the following interpretation: NSR    No orders to display       ASSESSMENT:    Active Hospital Problems    Diagnosis Date Noted    CHF (congestive heart failure), NYHA class I, acute on chronic, combined ([facility] [I50.43] 03/20/2020         PLAN:  Acute on chronic systolic heart failure  - marked weight gain, volume overload noted, elevated BNP on admission  - last echo 1/20 with EF 25%, severe anterior hypokinesis, grade II diastolic dysfunction  - IV lasix ordered per nephrology  - due to hypotension, unable to tolerate BB, ACEi/ARB  - daily weights    SHAUNNA with baseline CKD stage III  - likely pre-renal from cardiorenal syndrome  - IV lasix ordered per nephrology  - continue to monitor closely    Hypotension  - likely 2/2 heart failure  - no improvement with IVF in ED  - monitor closely with IV lasix ordered  - Midodrine ordered  - currently asymptomatic    DMII  - well controlled  - continue home lantus with SSI    CAD  - no active chest pain  - continue home ASA, plavix, statin    COPD  - no evidence of exacerbation  - continue home inhalers    HLD  - continue home statin    Opioid dependence  - continue home suboxone    Morbid obesity  With Body mass index is 91.57 kg/m². Complicating assessment and treatment. Placing patient at risk for multiple co-morbidities as well as early death and contributing to the patient's presentation. Counseled on weight loss. DVT Prophylaxis: SQH  Diet: DIET CARB CONTROL;  Code Status: Full Code    PT/OT Eval Status: not ordered    Dispo - possibly 2-3 days        Prince Jama MD    Thank you Francis Parsons PA-C for the opportunity to be involved in this patient's care. If you have any questions or concerns please feel free to contact me at [phone].

## 2020-03-21 NOTE — PROGRESS NOTES
01/20/2020    Dr. Pasha Kuhn      pancreatitis post surgery    CORONARY ANGIOPLASTY WITH STENT PLACEMENT  03/16/2018    MELODY- 3.5 x 38 and 3.5 x 20 to Cx    CORONARY ANGIOPLASTY WITH STENT PLACEMENT  01/03/2018    MELODY- 3.0 x 38 and 2.75 x 26 and 2.75 x 8 and 2.75 x 22 to the LAD    CORONARY ARTERY BYPASS GRAFT N/A 1/27/2020    CORONARY ARTERY BYPASS GRAFTING X 1, ON PUMP BEATING HEART, INTERNAL MAMMARY ARTERY TO LEFT ANTERIOR DESCENDING ARTERY, VAS CATH INSERTION, URI, PLATELET GEL APPLICATION, 5 LEVEL BILATERAL INTERCOSTAL NERVE BLOCK, STERNAL PLATING performed by Vira Chiang MD at [address] Right 5/16/2019    fem-pop, performed by Rhina Wang MD at [address]  02/14/2019    CardioMEMs insertion for CHF    ROTATOR CUFF REPAIR Left 04/22/2016    TONSILLECTOMY      TRANSLUMINAL ANGIOPLASTY Bilateral 10/08/2019    BLE w/PTA occluded L SFA and restenting L Prox SFA    TUMOR EXCISION      benign tumors X 2 chest & axilla    UPPER GASTROINTESTINAL ENDOSCOPY  4/25/2013    BX barretts, HH, gastritis    UPPER GASTROINTESTINAL ENDOSCOPY  12/10/2015    UPPER GASTROINTESTINAL ENDOSCOPY  01/06/2017    Gastritis       Level of Consciousness: Alert, Oriented, and Cooperative = 0    Level of Activity: Walking unassisted = 0    Respiratory Pattern: Regular Pattern; RR 8-20 = 0    Breath Sounds: Clear = 0    Sputum   ,  ,    Cough: Strong, spontaneous, non-productive = 0    Vital Signs   BP (!) 71/48   Pulse 77   Temp 97.8 °F (36.6 °C) (Oral)   Resp 18   Ht 5' 4.5\" (1.638 m)   Wt 282 lb 3 oz (128 kg)   LMP 10/24/2012   SpO2 98%   BMI 47.69 kg/m²   SPO2 (COPD values may differ): Greater than or equal to 92% on room air = 0    Peak Flow (asthma only): not applicable = 0    RSI: 0-4 = See once and convert to home regimen or discontinue        Plan       Goals: medication delivery, mobilize retained secretions, volume expansion and improve

## 2020-03-21 NOTE — PROGRESS NOTES
Notified ERNESTINE Reese, @ [phone] 3/21/20 re: cardiac consult. -[phone] [facility]

## 2020-03-21 NOTE — CONSULTS
Patient seen and examined, consult note dictated.     Assessment and Plan:     1- A/CKD: The patient has chronic kidney disease with a baseline creatinine of 1.5 to 1.7. Her SHAUNNA is likely secondary to a non oliguric ATN in the setting of hypotension and renal hypoperfusion. Her urinary output is maintained and her serum creatinine is slowly trending down. - Hold all diuretics for now. - Continue to monitor off of IV fluids.  - Maintain systolic blood pressure > 100 mmHg. 2- Hyponatremia: Stable s/p IV lasix, will apply free water restriction. 3- Hyperkalemia: Resolved s/p medical therapy, monitor. 4- Hypotension: Hold all antihypertensive medications and continue scheduled Midodrine along with IV Albumin. 5- Anemia: Stable hemoglobin, monitor.

## 2020-03-21 NOTE — PROGRESS NOTES
Paged cross cover MD regarding pt requesting something for constipation. Pt states last BM was 2 days ago. Awaiting response.

## 2020-03-21 NOTE — CONSULTS
[address] [place] [address]  [phone]      Attending Physician: Oneyda Mcgowan MD  Reason for Consultation/Chief Complaint: Hypotension    Subjective   History of Present Illness:  Yousif Beck is a 62 y.o. patient who presented to the hospital with complaints of hypotension and edema. Pt was recently in house for CHf and SHAUNNA from diuresis, went home and states noted incresing leg edema and now legs are tight, increasing SOB and not able to walk >10ft w/o HIGHTOWER. No CP. States was previously taking increasd diurestics    Past Medical History:   has a past medical history of Anxiety and depression, Arthritis, CAD (coronary artery disease), Cardiomyopathy ([address] CHF (congestive heart failure) ([address] Chronic systolic heart failure ([address] COPD (chronic obstructive pulmonary disease) ([address] Diabetes mellitus ([address] GERD (gastroesophageal reflux disease), Hyperlipidemia, Hypertension, Hypotension, MI (myocardial infarction) ([address] Peripheral neuropathy, Peripheral vascular disease ([address] Pulmonary HTN ([address] Reflux, Sleep apnea, and Wears glasses. Surgical History:   has a past surgical history that includes Tonsillectomy; Cholecystectomy; tumor excision; Upper gastrointestinal endoscopy (4/25/2013); Upper gastrointestinal endoscopy (12/10/2015); Upper gastrointestinal endoscopy (01/06/2017); Arterial bypass surgry (Left, 01/06/2016); Cardiac catheterization (03/27/2018); Coronary angioplasty with stent (03/16/2018); Rotator cuff repair (Left, 04/22/2016); Coronary angioplasty with stent (01/03/2018); Insertable Cardiac Monitor (02/14/2019); femoral bypass (Right, 5/16/2019); transluminal angioplasty (Bilateral, 10/08/2019); Cardiac catheterization (01/20/2020); and Coronary artery bypass graft (N/A, 1/27/2020). Social History:   reports that she has been smoking cigarettes. She has a 3.00 pack-year smoking history.  She has never used smokeless tobacco. She reports that she does not drink alcohol or use drugs. Family History:  family history includes Cancer in her maternal aunt and mother; Heart Disease in her maternal grandmother. Home Medications:  Were reviewed and are listed in nursing record and/or below  Prior to Admission medications    Medication Sig Start Date End Date Taking? Authorizing Provider   insulin lispro, 1 Unit Dial, 100 UNIT/ML SOPN Inject 10 Units into the skin 3 times daily  Patient not taking: Reported on 3/17/2020 3/17/20   TRAY Kelley CNP   Insulin Degludec (TRESIBA FLEXTOUCH) 100 UNIT/ML SOPN Inject 30 Units into the skin nightly 3/13/20   Kentrell Alexandra MD   torsemide [facility]) 100 MG tablet Take 1 tablet by mouth daily 3/4/20   Dianatha ErpsTRAY CNP   atorvastatin (LIPITOR) 80 MG tablet Take 1 tablet by mouth nightly 2/28/20   Mertha ErpsTRAY CNP   tiotropium (SPIRIVA RESPIMAT) 2.5 MCG/ACT AERS inhaler INHALE 2 PUFFS INTO THE LUNGS DAILY 2/25/20   Charly Pablo MD   potassium chloride (KLOR-CON M) 20 MEQ extended release tablet Take 40 mEq by mouth 4 times daily     Historical Provider, MD   buprenorphine-naloxone (SUBOXONE) 8-2 MG FILM SL film Place 1 Film under the tongue 2 times daily. Historical Provider, MD   albuterol sulfate  (90 Base) MCG/ACT inhaler Inhale 2 puffs into the lungs every 6 hours as needed for Wheezing or Shortness of Breath    Historical Provider, MD   benztropine (COGENTIN) 1 MG tablet Take 1 mg by mouth nightly  12/13/19   Historical Provider, MD   blood glucose test strips (EXACTECH TEST) strip 6 times daily.  12/19/19   TRAY Kelley CNP   Insulin Pen Needle (B-D ULTRAFINE III SHORT PEN) 31G X 8 MM MISC Five shots a day 12/3/19   TRAY Kelley CNP   INSULIN SYRINGE .5CC/29G 29G X 1/2\" 0.5 ML MISC 1 each by Does not apply route daily 12/3/19   TRAY Kelley CNP   Liraglutide (VICTOZA) 18 MG/3ML SOPN SC injection Inject 1.2 mg into the skin daily 10/29/19   Danuta Ladd, APRN - CNP   vitamin D (HUMALOG) injection vial 0-12 Units, TID WC  insulin lispro (HUMALOG) injection vial 0-6 Units, Nightly  albuterol (PROVENTIL) nebulizer solution 2.5 mg, Q6H PRN  heparin (porcine) injection 5,000 Units, 3 times per day  midodrine (PROAMATINE) tablet 10 mg, TID WC        Allergies:  Lisinopril     Review of Systems:   A 14 point review of symptoms completed. Pertinent positives identified in the HPI, all other review of symptoms negative as below. Objective   PHYSICAL EXAM:    Vitals:    03/21/20 1023   BP: 103/69   Pulse: 89   Resp:    Temp:    SpO2:     Weight: 274 lb 7.6 oz (124.5 kg)         General Appearance:  Alert, cooperative, no distress, appears stated age, Obese   Head:  Normocephalic, without obvious abnormality, atraumatic   Eyes:  PERRL, conjunctiva/corneas clear   Nose: Nares normal, no drainage or sinus tenderness   Throat: Lips, mucosa, and tongue normal   Neck: Supple, symmetrical, trachea midline, no adenopathy, thyroid: not enlarged, symmetric, no tenderness/mass/nodules, no carotid bruit or JVD (unable to assess due to large neck)   Lungs:   Clear to auscultation bilaterally, respirations unlabored   Chest Wall:  No deformity or tenderness.  + mild yellow drainage from inferior CABG site   Heart:  Regular rate and rhythm, S1, S2 normal, no murmur, rub or gallop   Abdomen:   Soft, non-tender, bowel sounds active all four quadrants,  no masses, no organomegaly   Extremities: Extremities normal, atraumatic, no cyanosis, 4+ tense BLE pitting/nonpitting edema   Pulses: 2+ and symmetric   Skin: Skin color, texture, turgor normal, no rashes or lesions   Pysch: Normal mood and affect   Neurologic: Normal gross motor and sensory exam.         Labs   CBC:   Lab Results   Component Value Date    WBC 14.6 03/21/2020    RBC 3.52 03/21/2020    HGB 9.8 03/21/2020    HCT 30.6 03/21/2020    MCV 86.8 03/21/2020    RDW 18.4 03/21/2020     03/21/2020     CMP:  Lab Results   Component Value Date    NA 129 03/21/2020    K 4.9 03/21/2020    K 5.6 03/20/2020    CL 91 03/21/2020    CO2 24 03/21/2020    BUN 92 03/21/2020    CREATININE 3.3 03/21/2020    GFRAA 17 03/21/2020    AGRATIO 0.9 03/20/2020    LABGLOM 14 03/21/2020    GLUCOSE 112 03/21/2020    PROT 7.1 03/20/2020    CALCIUM 8.6 03/21/2020    BILITOT 0.4 03/20/2020    ALKPHOS 147 03/20/2020    AST 20 03/20/2020    ALT 11 03/20/2020     PT/INR:  No results found for: PTINR  HgBA1c:  Lab Results   Component Value Date    LABA1C 5.8 03/20/2020     Lab Results   Component Value Date    TROPONINI 0.04 (H) 03/20/2020         Cardiac Data     Last EKG:   3/20/20 NSR with 1st AVB, RBBB, LAD<, Lateral infarct    Echo: 1/22/2020   Technically difficult examination secondary to habitus. The left ventricular systolic function is severely reduced with an ejection   fraction of 25 %. There is severe hypokinesis of the anterior and apical walls consistent with   infarction within the territory of the left anterior descending artery. Grade II diastolic dysfunction with elevated left ventricular filling   pressure. Moderate to severe mitral regurgitation. Mild tricuspid regurgitation. is estimated at 53 mmHg consistent with moderate pulmonary hypertension   (Right atrial pressure of 3 mmHg). If clinically necessary, consider URI for better evaluation of mitral   regurgitation. Stress Test:    Cath:    Studies:   CXR:  Stable chest x-ray with no acute disease.  Status post thoracotomy with CABG   changes.  Stable cardiomegaly.  No evidence of CHF or pneumonia. Assessment and Plan      1. Hypotension  2. Chronic Systolic CHF   - CardioMEMS implanted 2/2019, last PAD was 25mmHg (beseline for patient   - Wt 241lbs on 1/21/20 visit, now 274  3. Ischemic Cardiomyopathy: LVEF 25% (40% on URI)  4. Mitral regurg:   - severe  5. Pulmonary Hypertension: 53mmHg  6. CAD   - s/p CABg 1/27/2020 (LIMA-LAD  7. Acute on CKD  8.  PAD:               - SFA disease

## 2020-03-21 NOTE — PLAN OF CARE
Problem: Falls - Risk of:  Goal: Will remain free from falls  Description: Will remain free from falls  Outcome: Ongoing  Goal: Absence of physical injury  Description: Absence of physical injury  Outcome: Ongoing     Problem: OXYGENATION/RESPIRATORY FUNCTION  Goal: Patient will maintain patent airway  Outcome: Ongoing  Goal: Patient will achieve/maintain normal respiratory rate/effort  Description: Respiratory rate and effort will be within normal limits for the patient  Outcome: Ongoing     Problem: HEMODYNAMIC STATUS  Goal: Patient has stable vital signs and fluid balance  Outcome: Ongoing  Note:   Patient's EF (Ejection Fraction) is less than 40%    Heart Failure Medications:  Diuretics[de-identified] Furosemide    (One of the following REQUIRED for EF <40%/SYSTOLIC FAILURE but MAY be used in EF% >40%/DIASTOLIC FAILURE)        ACE[de-identified] None        ARB[de-identified] None         ARNI[de-identified] None    (Beta Blockers)  NON- Evidenced Based Beta Blocker (for EF% >40%/DIASTOLIC FAILURE): None    Evidenced Based Beta Blocker::(REQUIRED for EF% <40%/SYSTOLIC FAILURE) None  . .................................................................................................................................................. Patient's weights and intake/output reviewed: Yes    Patient's Last Weight: 124.5 kg obtained by bed scale. Difference of 7.7 lbs less than last documented weight.       Intake/Output Summary (Last 24 hours) at 3/21/2020 9603  Last data filed at 3/21/2020 0326  Gross per 24 hour   Intake 840 ml   Output 850 ml   Net -10 ml       Comorbidities Reviewed Yes    Patient has a past medical history of Anxiety and depression, Arthritis, CAD (coronary artery disease), Cardiomyopathy ([facility] [address] CHF (congestive heart failure) ([facility] [address] Chronic systolic heart failure ([facility] [address] COPD (chronic obstructive pulmonary disease) ([facility] [address] Diabetes mellitus ([facility] [address] GERD (gastroesophageal reflux disease), Hyperlipidemia, Hypertension, Hypotension, MI (myocardial infarction) [facility]), Peripheral neuropathy, Peripheral vascular disease ([facility] [address] Pulmonary HTN ([facility] [address] Reflux, Sleep apnea, and Wears glasses. >>For CHF and Comorbidity documentation on Education Time and Topics, please see Education Tab    Progressive Mobility Assessment:  What is this patient's Current Level of Mobility?: Requires Bed Rest at times d/t BP  How was this patient Mobilized today?: Edge of Bed and Bedside Commode                 With Whom? Nurse and PCA                 Level of Difficulty/Assistance: 1x Assist     Pt resting in bed at this time on room air. Pt denies shortness of breath. Pt with pitting lower extremity edema. Patient and/or Family's stated Goal of Care this Admission: reduce shortness of breath, increase activity tolerance, better understand heart failure and disease management, be more comfortable, and reduce lower extremity edema prior to discharge        :         Problem: FLUID AND ELECTROLYTE IMBALANCE  Goal: Fluid and electrolyte balance are achieved/maintained  Outcome: Ongoing     Problem: ACTIVITY INTOLERANCE/IMPAIRED MOBILITY  Goal: Mobility/activity is maintained at optimum level for patient  Outcome: Ongoing     Problem:  Activity:  Goal: Risk for activity intolerance will decrease  Description: Risk for activity intolerance will decrease  Outcome: Ongoing     Problem: Coping:  Goal: Ability to adjust to condition or change in health will improve  Description: Ability to adjust to condition or change in health will improve  Outcome: Ongoing     Problem: Fluid Volume:  Goal: Ability to maintain a balanced intake and output will improve  Description: Ability to maintain a balanced intake and output will improve  Outcome: Ongoing     Problem: Health Behavior:  Goal: Ability to identify and utilize available resources and services will improve  Description: Ability to identify and utilize available resources and services will improve  Outcome: Ongoing  Goal: Ability to

## 2020-03-22 LAB
ALBUMIN SERPL-MCNC: 3.6 G/DL (ref 3.4–5)
ANION GAP SERPL CALCULATED.3IONS-SCNC: 14 MMOL/L (ref 3–16)
BUN BLDV-MCNC: 93 MG/DL (ref 7–20)
CALCIUM SERPL-MCNC: 8.9 MG/DL (ref 8.3–10.6)
CHLORIDE BLD-SCNC: 90 MMOL/L (ref 99–110)
CO2: 25 MMOL/L (ref 21–32)
CREAT SERPL-MCNC: 2.8 MG/DL (ref 0.6–1.1)
GFR AFRICAN AMERICAN: 21
GFR NON-AFRICAN AMERICAN: 17
GLUCOSE BLD-MCNC: 127 MG/DL (ref 70–99)
GLUCOSE BLD-MCNC: 132 MG/DL (ref 70–99)
GLUCOSE BLD-MCNC: 141 MG/DL (ref 70–99)
GLUCOSE BLD-MCNC: 95 MG/DL (ref 70–99)
GLUCOSE BLD-MCNC: 99 MG/DL (ref 70–99)
PERFORMED ON: ABNORMAL
PERFORMED ON: NORMAL
PHOSPHORUS: 5.3 MG/DL (ref 2.5–4.9)
POTASSIUM SERPL-SCNC: 4 MMOL/L (ref 3.5–5.1)
SODIUM BLD-SCNC: 129 MMOL/L (ref 136–145)

## 2020-03-22 PROCEDURE — 2060000000 HC ICU INTERMEDIATE R&B

## 2020-03-22 PROCEDURE — 94640 AIRWAY INHALATION TREATMENT: CPT

## 2020-03-22 PROCEDURE — 6370000000 HC RX 637 (ALT 250 FOR IP): Performed by: THORACIC SURGERY (CARDIOTHORACIC VASCULAR SURGERY)

## 2020-03-22 PROCEDURE — 2580000003 HC RX 258: Performed by: INTERNAL MEDICINE

## 2020-03-22 PROCEDURE — 6360000002 HC RX W HCPCS: Performed by: INTERNAL MEDICINE

## 2020-03-22 PROCEDURE — 80069 RENAL FUNCTION PANEL: CPT

## 2020-03-22 PROCEDURE — 6370000000 HC RX 637 (ALT 250 FOR IP): Performed by: NURSE PRACTITIONER

## 2020-03-22 PROCEDURE — 6370000000 HC RX 637 (ALT 250 FOR IP): Performed by: INTERNAL MEDICINE

## 2020-03-22 PROCEDURE — 99233 SBSQ HOSP IP/OBS HIGH 50: CPT | Performed by: NURSE PRACTITIONER

## 2020-03-22 PROCEDURE — P9047 ALBUMIN (HUMAN), 25%, 50ML: HCPCS | Performed by: INTERNAL MEDICINE

## 2020-03-22 PROCEDURE — 36415 COLL VENOUS BLD VENIPUNCTURE: CPT

## 2020-03-22 RX ADMIN — ATORVASTATIN CALCIUM 80 MG: 80 TABLET, FILM COATED ORAL at 21:16

## 2020-03-22 RX ADMIN — MIDODRINE HYDROCHLORIDE 10 MG: 5 TABLET ORAL at 16:56

## 2020-03-22 RX ADMIN — HEPARIN SODIUM 5000 UNITS: 5000 INJECTION INTRAVENOUS; SUBCUTANEOUS at 21:16

## 2020-03-22 RX ADMIN — HEPARIN SODIUM 5000 UNITS: 5000 INJECTION INTRAVENOUS; SUBCUTANEOUS at 05:18

## 2020-03-22 RX ADMIN — OXYCODONE HYDROCHLORIDE AND ACETAMINOPHEN 1 TABLET: 5; 325 TABLET ORAL at 03:33

## 2020-03-22 RX ADMIN — ASPIRIN 81 MG: 81 TABLET, COATED ORAL at 09:38

## 2020-03-22 RX ADMIN — BUSPIRONE HYDROCHLORIDE 30 MG: 5 TABLET ORAL at 21:16

## 2020-03-22 RX ADMIN — MIDODRINE HYDROCHLORIDE 10 MG: 5 TABLET ORAL at 11:44

## 2020-03-22 RX ADMIN — BUPRENORPHINE AND NALOXONE 1 FILM: 8; 2 FILM BUCCAL; SUBLINGUAL at 21:16

## 2020-03-22 RX ADMIN — Medication 10 ML: at 21:15

## 2020-03-22 RX ADMIN — BUPRENORPHINE AND NALOXONE 1 FILM: 8; 2 FILM BUCCAL; SUBLINGUAL at 09:06

## 2020-03-22 RX ADMIN — MAGNESIUM HYDROXIDE 30 ML: 2400 SUSPENSION ORAL at 03:33

## 2020-03-22 RX ADMIN — Medication 10 ML: at 09:38

## 2020-03-22 RX ADMIN — ALBUMIN (HUMAN) 25 G: 0.25 INJECTION, SOLUTION INTRAVENOUS at 05:18

## 2020-03-22 RX ADMIN — HEPARIN SODIUM 5000 UNITS: 5000 INJECTION INTRAVENOUS; SUBCUTANEOUS at 16:56

## 2020-03-22 RX ADMIN — AMOXICILLIN AND CLAVULANATE POTASSIUM 1 TABLET: 875; 125 TABLET, FILM COATED ORAL at 09:37

## 2020-03-22 RX ADMIN — PANTOPRAZOLE SODIUM 40 MG: 40 TABLET, DELAYED RELEASE ORAL at 16:56

## 2020-03-22 RX ADMIN — LAMOTRIGINE 150 MG: 100 TABLET ORAL at 09:37

## 2020-03-22 RX ADMIN — POLYETHYLENE GLYCOL 3350 17 G: 17 POWDER, FOR SOLUTION ORAL at 09:38

## 2020-03-22 RX ADMIN — BENZTROPINE MESYLATE 1 MG: 1 TABLET ORAL at 21:16

## 2020-03-22 RX ADMIN — LAMOTRIGINE 150 MG: 100 TABLET ORAL at 21:16

## 2020-03-22 RX ADMIN — TIOTROPIUM BROMIDE INHALATION SPRAY 2 PUFF: 3.12 SPRAY, METERED RESPIRATORY (INHALATION) at 09:00

## 2020-03-22 RX ADMIN — AMOXICILLIN AND CLAVULANATE POTASSIUM 1 TABLET: 875; 125 TABLET, FILM COATED ORAL at 21:16

## 2020-03-22 RX ADMIN — BUSPIRONE HYDROCHLORIDE 30 MG: 5 TABLET ORAL at 09:37

## 2020-03-22 RX ADMIN — CLOPIDOGREL BISULFATE 75 MG: 75 TABLET ORAL at 09:38

## 2020-03-22 RX ADMIN — PANTOPRAZOLE SODIUM 40 MG: 40 TABLET, DELAYED RELEASE ORAL at 09:38

## 2020-03-22 RX ADMIN — MIDODRINE HYDROCHLORIDE 10 MG: 5 TABLET ORAL at 09:38

## 2020-03-22 RX ADMIN — ARIPIPRAZOLE 10 MG: 10 TABLET ORAL at 21:16

## 2020-03-22 ASSESSMENT — PAIN SCALES - GENERAL: PAINLEVEL_OUTOF10: 7

## 2020-03-22 ASSESSMENT — ENCOUNTER SYMPTOMS: SHORTNESS OF BREATH: 1

## 2020-03-22 ASSESSMENT — PAIN DESCRIPTION - PAIN TYPE: TYPE: ACUTE PAIN

## 2020-03-22 ASSESSMENT — PAIN DESCRIPTION - LOCATION: LOCATION: BACK;ABDOMEN

## 2020-03-22 NOTE — PROGRESS NOTES
Hospitalist Progress Note      PCP: Reena Abbott PA-C    Date of Admission: 3/20/2020    Chief Complaint: hypotension, weight gain    Hospital Course:   62 y.o. female who presented to [facility] with hypotension and weight gain. Patient was just discharged last week for SHAUNNA and CHF exacerbation. Since her discharge, she has had persistent weight gain and increasing LE edema. She has also had persistent BP in the 46T-12L systolic. This is common for her and she has never had symptoms associated with BP in the this range before. She has been in contact with her cardiology team since her discharge and increased her home diuretic but it had no effect on her weight gain. Over the past couple days, she has had increased HIGHTOWER as well which is typical for her when she gets volume overloaded. She denies fevers, chills, chest pain, nausea, vomiting or diarrhea. Subjective: Still having increased SOB. No improvement in LE edema.        Medications:  Reviewed    Infusion Medications    dextrose       Scheduled Medications    polyethylene glycol  17 g Oral Daily    amoxicillin-clavulanate  1 tablet Oral 2 times per day    ARIPiprazole  10 mg Oral Daily    aspirin EC  81 mg Oral Daily    atorvastatin  80 mg Oral Nightly    benztropine  1 mg Oral Nightly    buprenorphine-naloxone  1 Film Sublingual BID    busPIRone  30 mg Oral BID    clopidogrel  75 mg Oral Daily    lamoTRIgine  150 mg Oral BID    pantoprazole  40 mg Oral BID    tiotropium  2 puff Inhalation Daily    sodium chloride flush  10 mL Intravenous 2 times per day    insulin lispro  8 Units Subcutaneous TID     insulin lispro  0-12 Units Subcutaneous TID WC    insulin lispro  0-6 Units Subcutaneous Nightly    heparin (porcine)  5,000 Units Subcutaneous 3 times per day    midodrine  10 mg Oral TID      PRN Meds: oxyCODONE-acetaminophen, magnesium hydroxide, cyclobenzaprine, sodium chloride flush, acetaminophen **OR** acetaminophen, promethazine **OR** ondansetron, glucose, dextrose, glucagon (rDNA), dextrose, albuterol      Intake/Output Summary (Last 24 hours) at 3/22/2020 1030  Last data filed at 3/22/2020 7539  Gross per 24 hour   Intake 878 ml   Output 2315 ml   Net -1437 ml       Physical Exam Performed:    /63   Pulse 83   Temp 98.2 °F (36.8 °C) (Oral)   Resp 20   Ht 5' 4.5\" (1.638 m)   Wt 276 lb 6.4 oz (125.4 kg)   LMP 10/24/2012   SpO2 99%   BMI 46.71 kg/m²     General appearance:  No apparent distress, appears stated age and cooperative. HEENT:  Normal cephalic, atraumatic without obvious deformity. Pupils equal, round, and reactive to light. Extra ocular muscles intact. Conjunctivae/corneas clear. Neck: Supple, with full range of motion. No jugular venous distention. Trachea midline. Respiratory:  Normal respiratory effort. Clear to auscultation, bilaterally without Rales/Wheezes/Rhonchi. Cardiovascular:  Regular rate and rhythm with normal S1/S2 without murmurs, rubs or gallops. Abdomen: Soft, non-tender, non-distended with normal bowel sounds. Musculoskeletal:  No clubbing, cyanosis. Marked LE edema bilaterally. Full range of motion without deformity. Skin: Skin color, texture, turgor normal.  No rashes or lesions. Neurologic:  Neurovascularly intact without any focal sensory/motor deficits.  Cranial nerves: II-XII intact, grossly non-focal.  Psychiatric:  Alert and oriented, thought content appropriate, normal insight  Capillary Refill: Brisk,< 3 seconds   Peripheral Pulses: +2 palpable, equal bilaterally     Labs:   Recent Labs     03/20/20  1315 03/21/20  0414   WBC 12.2* 14.6*   HGB 10.5* 9.8*   HCT 33.0* 30.6*    340     Recent Labs     03/20/20  1315 03/21/20  0414 03/22/20  0450   * 129* 129*   K 5.6* 4.9 4.0   CL 91* 91* 90*   CO2 23 24 25   BUN 90* 92* 93*   CREATININE 3.5* 3.3* 2.8*   CALCIUM 9.3 8.6 8.9   PHOS  --  6.4* 5.3*     Recent Labs     03/20/20  1315   AST 20   ALT 11 treatment. Placing patient at risk for multiple co-morbidities as well as early death and contributing to the patient's presentation. Counseled on weight loss. DVT Prophylaxis: SQH  Diet: DIET CARB CONTROL; Daily Fluid Restriction: 1000 ml; Low Potassium  Code Status: Full Code    PT/OT Eval Status: not ordered    Dispo - at least three days, likely longer.     Fidel Merida MD

## 2020-03-22 NOTE — PROGRESS NOTES
flow   velocities. Moderate amount of plaque in the aorta. Echo 1/22/2020:   Technically difficult examination secondary to habitus. The left ventricular systolic function is severely reduced with an ejection   fraction of 25 %. There is severe hypokinesis of the anterior and apical walls consistent with   infarction within the territory of the left anterior descending artery. Grade II diastolic dysfunction with elevated left ventricular filling   pressure. Moderate to severe mitral regurgitation. Mild tricuspid regurgitation. is estimated at 53 mmHg consistent with moderate pulmonary hypertension   (Right atrial pressure of 3 mmHg). If clinically necessary, consider URI for better evaluation of mitral   regurgitation    Coronary angiogram 1/20/2020:  Dominance : Right  LM: 70-80% short distal stenosis  LAD: 70-80% short ostial stenosis, extending into takeoff of high first diagonal; large proximal to mid stented segment patent with jailed second diagonal  (80% ostial D2) and 70% in stent restenosis of most distal stent; distal to near apical LAD with minimal disease  LCx: 70-80% short ostial stenosis, prior to patent proximal to mid stents   RCA: large, dominant vessel with long 60% proximal to mid stenosis   LVEDP: 4 mmHG  LVG not done due to CKD. Diabetic with previous LAD and LCx stenting in 2018 returns with unstable angina, in stent restenosis and Left Main bifurcation disease. Recommend CTS evaluation inpatient to discuss surgical revascularization option. Hold Clopidogrel.     Lab Reviewed:     Renal Profile:  Lab Results   Component Value Date    CREATININE 2.8 03/22/2020    BUN 93 03/22/2020     03/22/2020    K 4.0 03/22/2020    K 5.6 03/20/2020    CL 90 03/22/2020    CO2 25 03/22/2020     CBC:    Lab Results   Component Value Date    WBC 14.6 03/21/2020    RBC 3.52 03/21/2020    HGB 9.8 03/21/2020    HCT 30.6 03/21/2020    MCV 86.8 03/21/2020    RDW 18.4 03/21/2020    PLT 340 03/21/2020     BNP:    Lab Results   Component Value Date    PROBNP 3,113 03/20/2020    PROBNP 1,963 03/05/2020    PROBNP 2,219 02/24/2020    PROBNP 4,640 02/20/2020    PROBNP 2,312 01/20/2020     Fasting Lipid Panel:    Lab Results   Component Value Date    CHOL 58 01/27/2020    HDL 21 01/27/2020    TRIG 78 01/27/2020     Cardiac Enzymes:  CK/MbTroponin  Lab Results   Component Value Date    TROPONINI 0.04 03/20/2020     PT/ INR   Lab Results   Component Value Date    INR 1.09 03/10/2020    INR 1.44 01/28/2020    INR 1.65 01/27/2020    PROTIME 12.7 03/10/2020    PROTIME 16.8 01/28/2020    PROTIME 19.2 01/27/2020     PTT No results found for: PTT   Lab Results   Component Value Date    MG 1.70 03/13/2020      Lab Results   Component Value Date    TSH 2.03 01/10/2020     All labs and imaging reviewed today    Assessment:  Shortness of breath: ongoing  Edema: unchanged  CAD: no angina; s/p LIMA-LAD 1/27/2020; s/p multiple PCIs  Chronic systolic CHF: difficult picture, significant edema and weight gain but with stable PAD on cardiomems   - s/p cardiomems 2/14/2019  Ischemic cardiomyopathy: known history, 35-40% on URI 1/24/2020, 25% on echo 1/22/2020  Hypotension: stable  Mitral regurgitation: moderate-severe on URI 1/24/2020  Pulmonary HTN  PAD: LLE angiogram cancelled 3/10/2020 due to SHAUNNA, plan to reschedule; known SFA disease and left subclavian bypass  A/CKD: nephrology following  Sternal wound infection  COPD  CLINT    Plan:   1. Continue to hold diuretics today to allow fluid to mobilize, renal function improving; re evaluate need for lasix daily  2. Holding lopressor due to hypotension (change to evidence based beta blocker prior to discharge)  3. Continue aspirin, statin, plavix  4. No ACE/ARB/ARNI due to SHAUNNA/hypotension  5. Elevate legs and wrap  6.  Daily weights, strict I/Os, fluid restriction    Crystal Johnson, APRN-CNP  [address]  [phone]

## 2020-03-22 NOTE — PROGRESS NOTES
Department of Internal Medicine  Nephrology Progress Note        SUBJECTIVE:    We are following this patient for A/CKD. The patient was seen and examined; she feels well today with no CP, SOB, nausea or vomiting. ROS: No fever or chills. Social: No family at bedside. Physical Exam:    VITALS:  /63   Pulse 83   Temp 98.2 °F (36.8 °C) (Oral)   Resp 20   Ht 5' 4.5\" (1.638 m)   Wt 276 lb 6.4 oz (125.4 kg)   LMP 10/24/2012   SpO2 99%   BMI 46.71 kg/m²     General appearance: Seems comfortable, no acute distress. Neck: Trachea midline, thyroid normal.   Lungs:  Non labored breathing, CTA to anterior auscultation. Heart:  S1S2 normal, rub or gallop. No peripheral edema. Abdomen: Soft, non-tender, no organomegaly. Skin: No lesions or rashes, warm to touch. DATA:    CBC:   Lab Results   Component Value Date    WBC 14.6 03/21/2020    RBC 3.52 03/21/2020    HGB 9.8 03/21/2020    HCT 30.6 03/21/2020    MCV 86.8 03/21/2020    MCH 27.7 03/21/2020    MCHC 31.9 03/21/2020    RDW 18.4 03/21/2020     03/21/2020    MPV 9.0 03/21/2020     BMP:    Lab Results   Component Value Date     03/22/2020    K 4.0 03/22/2020    K 5.6 03/20/2020    CL 90 03/22/2020    CO2 25 03/22/2020    BUN 93 03/22/2020    LABALBU 3.6 03/22/2020    CREATININE 2.8 03/22/2020    CALCIUM 8.9 03/22/2020    GFRAA 21 03/22/2020    LABGLOM 17 03/22/2020    GLUCOSE 99 03/22/2020       IMPRESSION/RECOMMENDATIONS:      1- A/CKD: The patient has chronic kidney disease with a baseline creatinine of 1.5 to 1.7. Her SHAUNNA is likely secondary to a non oliguric ATN in the setting of hypotension and renal hypoperfusion. Her urinary output is maintained and her serum creatinine is slowly trending down; continue to hold all diuretics and onitor off of IV fluids. 2- Hyponatremia: Stable s/p IV lasix, continue daily free water restriction. 3- Hyperkalemia: Resolved s/p medical therapy.   4- Hypotension: Blood pressure improving with

## 2020-03-22 NOTE — PLAN OF CARE
Problem: Falls - Risk of:  Goal: Will remain free from falls  Description: Will remain free from falls  3/22/2020 0257 by nAoop Deshpande RN  Outcome: Ongoing     Problem: Falls - Risk of:  Goal: Absence of physical injury  Description: Absence of physical injury  3/22/2020 0257 by Anoop Deshpande RN  Outcome: Ongoing     Problem: OXYGENATION/RESPIRATORY FUNCTION  Goal: Patient will maintain patent airway  3/22/2020 0257 by Anoop Deshpande RN  Outcome: Ongoing     Problem: OXYGENATION/RESPIRATORY FUNCTION  Goal: Patient will achieve/maintain normal respiratory rate/effort  Description: Respiratory rate and effort will be within normal limits for the patient  3/22/2020 0257 by Anoop Deshpande RN  Outcome: Ongoing     Problem: HEMODYNAMIC STATUS  Goal: Patient has stable vital signs and fluid balance  3/22/2020 0257 by Anoop Deshpande RN  Outcome: Ongoing     Problem: FLUID AND ELECTROLYTE IMBALANCE  Goal: Fluid and electrolyte balance are achieved/maintained  3/22/2020 0257 by Anoop Deshpande RN  Outcome: Ongoing  Note:   Patient's EF (Ejection Fraction) is less than 40%    Heart Failure Medications:  Diuretics[de-identified] None    (One of the following REQUIRED for EF <40%/SYSTOLIC FAILURE but MAY be used in EF% >40%/DIASTOLIC FAILURE)        ACE[de-identified] None        ARB[de-identified] None         ARNI[de-identified] None    (Beta Blockers)  NON- Evidenced Based Beta Blocker (for EF% >40%/DIASTOLIC FAILURE): None    Evidenced Based Beta Blocker::(REQUIRED for EF% <40%/SYSTOLIC FAILURE) None  . .................................................................................................................................................. Patient's weights and intake/output reviewed: Yes    Patient's Last Weight: 124.5 kg obtained by standing scale.        Intake/Output Summary (Last 24 hours) at 3/22/2020 0258  Last data filed at 3/21/2020 2148  Gross per 24 hour   Intake 1118 ml   Output 2965 ml   Net -1847 ml       Comorbidities Reviewed Yes    Patient has a past medical

## 2020-03-22 NOTE — PLAN OF CARE
Problem: Nutritional:  Goal: Maintenance of adequate nutrition will improve  Description: Maintenance of adequate nutrition will improve  Outcome: Ongoing  Goal: Progress toward achieving an optimal weight will improve  Description: Progress toward achieving an optimal weight will improve  Outcome: Ongoing     Problem: Physical Regulation:  Goal: Complications related to the disease process, condition or treatment will be avoided or minimized  Description: Complications related to the disease process, condition or treatment will be avoided or minimized  Outcome: Ongoing  Goal: Diagnostic test results will improve  Description: Diagnostic test results will improve  Outcome: Ongoing     Problem: Skin Integrity:  Goal: Risk for impaired skin integrity will decrease  Description: Risk for impaired skin integrity will decrease  Outcome: Ongoing     Problem: Tissue Perfusion:  Goal: Adequacy of tissue perfusion will improve  Description: Adequacy of tissue perfusion will improve  Outcome: Ongoing     Problem: Pain:  Goal: Pain level will decrease  Description: Pain level will decrease  Outcome: Ongoing  Goal: Control of acute pain  Description: Control of acute pain  Outcome: Ongoing  Goal: Control of chronic pain  Description: Control of chronic pain  Outcome: Ongoing

## 2020-03-23 LAB
ALBUMIN SERPL-MCNC: 3.8 G/DL (ref 3.4–5)
ANION GAP SERPL CALCULATED.3IONS-SCNC: 14 MMOL/L (ref 3–16)
BUN BLDV-MCNC: 82 MG/DL (ref 7–20)
CALCIUM SERPL-MCNC: 9.3 MG/DL (ref 8.3–10.6)
CHLORIDE BLD-SCNC: 91 MMOL/L (ref 99–110)
CO2: 26 MMOL/L (ref 21–32)
CREAT SERPL-MCNC: 2.1 MG/DL (ref 0.6–1.1)
GFR AFRICAN AMERICAN: 29
GFR NON-AFRICAN AMERICAN: 24
GLUCOSE BLD-MCNC: 100 MG/DL (ref 70–99)
GLUCOSE BLD-MCNC: 113 MG/DL (ref 70–99)
GLUCOSE BLD-MCNC: 118 MG/DL (ref 70–99)
GLUCOSE BLD-MCNC: 153 MG/DL (ref 70–99)
GLUCOSE BLD-MCNC: 278 MG/DL (ref 70–99)
GRAM STAIN RESULT: NORMAL
HCT VFR BLD CALC: 29 % (ref 36–48)
HEMOGLOBIN: 9.1 G/DL (ref 12–16)
MCH RBC QN AUTO: 27.2 PG (ref 26–34)
MCHC RBC AUTO-ENTMCNC: 31.6 G/DL (ref 31–36)
MCV RBC AUTO: 86.4 FL (ref 80–100)
PDW BLD-RTO: 18.7 % (ref 12.4–15.4)
PERFORMED ON: ABNORMAL
PHOSPHORUS: 4.2 MG/DL (ref 2.5–4.9)
PLATELET # BLD: 282 K/UL (ref 135–450)
PMV BLD AUTO: 8.3 FL (ref 5–10.5)
POTASSIUM SERPL-SCNC: 3.9 MMOL/L (ref 3.5–5.1)
PRO-BNP: 4372 PG/ML (ref 0–124)
RBC # BLD: 3.36 M/UL (ref 4–5.2)
SODIUM BLD-SCNC: 131 MMOL/L (ref 136–145)
WBC # BLD: 7.6 K/UL (ref 4–11)
WOUND/ABSCESS: NORMAL

## 2020-03-23 PROCEDURE — 99233 SBSQ HOSP IP/OBS HIGH 50: CPT | Performed by: NURSE PRACTITIONER

## 2020-03-23 PROCEDURE — 85027 COMPLETE CBC AUTOMATED: CPT

## 2020-03-23 PROCEDURE — 6370000000 HC RX 637 (ALT 250 FOR IP): Performed by: NURSE PRACTITIONER

## 2020-03-23 PROCEDURE — 36415 COLL VENOUS BLD VENIPUNCTURE: CPT

## 2020-03-23 PROCEDURE — 6370000000 HC RX 637 (ALT 250 FOR IP): Performed by: INTERNAL MEDICINE

## 2020-03-23 PROCEDURE — 6370000000 HC RX 637 (ALT 250 FOR IP): Performed by: THORACIC SURGERY (CARDIOTHORACIC VASCULAR SURGERY)

## 2020-03-23 PROCEDURE — 83880 ASSAY OF NATRIURETIC PEPTIDE: CPT

## 2020-03-23 PROCEDURE — 6360000002 HC RX W HCPCS: Performed by: INTERNAL MEDICINE

## 2020-03-23 PROCEDURE — 94640 AIRWAY INHALATION TREATMENT: CPT

## 2020-03-23 PROCEDURE — 2060000000 HC ICU INTERMEDIATE R&B

## 2020-03-23 PROCEDURE — 2580000003 HC RX 258: Performed by: INTERNAL MEDICINE

## 2020-03-23 PROCEDURE — 80069 RENAL FUNCTION PANEL: CPT

## 2020-03-23 RX ADMIN — BUPRENORPHINE AND NALOXONE 1 FILM: 8; 2 FILM BUCCAL; SUBLINGUAL at 21:18

## 2020-03-23 RX ADMIN — HEPARIN SODIUM 5000 UNITS: 5000 INJECTION INTRAVENOUS; SUBCUTANEOUS at 06:11

## 2020-03-23 RX ADMIN — TIOTROPIUM BROMIDE INHALATION SPRAY 2 PUFF: 3.12 SPRAY, METERED RESPIRATORY (INHALATION) at 08:57

## 2020-03-23 RX ADMIN — MIDODRINE HYDROCHLORIDE 10 MG: 5 TABLET ORAL at 11:51

## 2020-03-23 RX ADMIN — Medication 10 ML: at 21:19

## 2020-03-23 RX ADMIN — BUPRENORPHINE AND NALOXONE 1 FILM: 8; 2 FILM BUCCAL; SUBLINGUAL at 08:25

## 2020-03-23 RX ADMIN — PANTOPRAZOLE SODIUM 40 MG: 40 TABLET, DELAYED RELEASE ORAL at 16:18

## 2020-03-23 RX ADMIN — HEPARIN SODIUM 5000 UNITS: 5000 INJECTION INTRAVENOUS; SUBCUTANEOUS at 14:16

## 2020-03-23 RX ADMIN — MAGNESIUM HYDROXIDE 30 ML: 2400 SUSPENSION ORAL at 06:23

## 2020-03-23 RX ADMIN — Medication 10 ML: at 08:26

## 2020-03-23 RX ADMIN — MIDODRINE HYDROCHLORIDE 10 MG: 5 TABLET ORAL at 16:18

## 2020-03-23 RX ADMIN — INSULIN LISPRO 2 UNITS: 100 INJECTION, SOLUTION INTRAVENOUS; SUBCUTANEOUS at 11:51

## 2020-03-23 RX ADMIN — AMOXICILLIN AND CLAVULANATE POTASSIUM 1 TABLET: 875; 125 TABLET, FILM COATED ORAL at 21:18

## 2020-03-23 RX ADMIN — BUSPIRONE HYDROCHLORIDE 30 MG: 5 TABLET ORAL at 08:25

## 2020-03-23 RX ADMIN — PANTOPRAZOLE SODIUM 40 MG: 40 TABLET, DELAYED RELEASE ORAL at 08:25

## 2020-03-23 RX ADMIN — INSULIN LISPRO 8 UNITS: 100 INJECTION, SOLUTION INTRAVENOUS; SUBCUTANEOUS at 08:26

## 2020-03-23 RX ADMIN — ASPIRIN 81 MG: 81 TABLET, COATED ORAL at 08:25

## 2020-03-23 RX ADMIN — INSULIN LISPRO 6 UNITS: 100 INJECTION, SOLUTION INTRAVENOUS; SUBCUTANEOUS at 08:26

## 2020-03-23 RX ADMIN — BENZTROPINE MESYLATE 1 MG: 1 TABLET ORAL at 21:18

## 2020-03-23 RX ADMIN — HEPARIN SODIUM 5000 UNITS: 5000 INJECTION INTRAVENOUS; SUBCUTANEOUS at 21:19

## 2020-03-23 RX ADMIN — LAMOTRIGINE 150 MG: 100 TABLET ORAL at 08:24

## 2020-03-23 RX ADMIN — LAMOTRIGINE 150 MG: 100 TABLET ORAL at 21:18

## 2020-03-23 RX ADMIN — INSULIN LISPRO 8 UNITS: 100 INJECTION, SOLUTION INTRAVENOUS; SUBCUTANEOUS at 11:51

## 2020-03-23 RX ADMIN — AMOXICILLIN AND CLAVULANATE POTASSIUM 1 TABLET: 875; 125 TABLET, FILM COATED ORAL at 08:25

## 2020-03-23 RX ADMIN — ARIPIPRAZOLE 10 MG: 10 TABLET ORAL at 21:18

## 2020-03-23 RX ADMIN — ATORVASTATIN CALCIUM 80 MG: 80 TABLET, FILM COATED ORAL at 21:18

## 2020-03-23 RX ADMIN — MIDODRINE HYDROCHLORIDE 10 MG: 5 TABLET ORAL at 08:24

## 2020-03-23 RX ADMIN — POLYETHYLENE GLYCOL 3350 17 G: 17 POWDER, FOR SOLUTION ORAL at 08:25

## 2020-03-23 RX ADMIN — CLOPIDOGREL BISULFATE 75 MG: 75 TABLET ORAL at 08:24

## 2020-03-23 RX ADMIN — BUSPIRONE HYDROCHLORIDE 30 MG: 5 TABLET ORAL at 21:19

## 2020-03-23 NOTE — PROGRESS NOTES
[address]   Daily Progress Note    Admit Date:  3/20/2020  HPI:  No chief complaint on file. Interval history: admitted 3/20/2020 for hypotension, weight gain and edema. Also treated for A/CKD. CT surgery following for sternal wound infection. Subjective:  Ms. Remy Grimes continues to have edema. No chest pain. No palpitations. She is frustrated with her weight. Objective:   /70   Pulse 96   Temp 98.1 °F (36.7 °C) (Oral)   Resp 22   Ht 5' 4.5\" (1.638 m)   Wt 273 lb 12.8 oz (124.2 kg)   LMP 10/24/2012   SpO2 96%   BMI 46.27 kg/m²       Intake/Output Summary (Last 24 hours) at 3/23/2020 1325  Last data filed at 3/23/2020 0805  Gross per 24 hour   Intake 1260 ml   Output 4125 ml   Net -2865 ml       NYHA: IV    Physical Exam:  General:  Awake, alert, NAD, obese  Skin:  Warm and dry  Neck:  JVD difficult to assess  Chest:  Clear to auscultation, no wheezes/rhonchi/rales  Cardiovascular:  RRR S1S2, no m/r/g, dressing in place on anterior chest   Abdomen:  Soft, nontender, +bowel sounds  Extremities:  + BLE edema., + flank edema. Legs wrapped.      Medications:    polyethylene glycol  17 g Oral Daily    amoxicillin-clavulanate  1 tablet Oral 2 times per day    ARIPiprazole  10 mg Oral Daily    aspirin EC  81 mg Oral Daily    atorvastatin  80 mg Oral Nightly    benztropine  1 mg Oral Nightly    buprenorphine-naloxone  1 Film Sublingual BID    busPIRone  30 mg Oral BID    clopidogrel  75 mg Oral Daily    lamoTRIgine  150 mg Oral BID    pantoprazole  40 mg Oral BID    tiotropium  2 puff Inhalation Daily    sodium chloride flush  10 mL Intravenous 2 times per day    insulin lispro  8 Units Subcutaneous TID WC    insulin lispro  0-12 Units Subcutaneous TID WC    insulin lispro  0-6 Units Subcutaneous Nightly    heparin (porcine)  5,000 Units Subcutaneous 3 times per day    midodrine  10 mg Oral TID WC      dextrose         Lab Data:  CBC:   Recent Labs     03/21/20  2434 03/23/20  0456   WBC 14.6* 7.6   HGB 9.8* 9.1*    282     BMP:    Recent Labs     03/21/20  0414 03/22/20  0450 03/23/20 0456   * 129* 131*   K 4.9 4.0 3.9   CO2 24 25 26   BUN 92* 93* 82*   CREATININE 3.3* 2.8* 2.1*     INR:  No results for input(s): INR in the last 72 hours. BNP:    Recent Labs     03/23/20 0456   PROBNP 4,372*     Lab Results   Component Value Date    LVEF 38 01/24/2020     CABG 1/27/2020:  Urgent CABG X 1, with pedicled LIMA to LAD, SGC, CPB with On pump-beating heart, URI, Epiaortic ultrasound, Doppler verification of grafts, Bilateral 5 level intercostal nerve block(Exparel), Platelet gel application.   Sternal plating.  Vas Cath placement.     URI 1/24/2020:  Ejection fraction is visually estimated at 35-40%.   Moderate to severe mitral regurgitation with multiple jets visualized.   ERO estimated at 0.29 cm2.   Mild tricuspid regurgitation.   The left atrial appendage shows evidence of thrombus formation and low flow   velocities.   Moderate amount of plaque in the aorta.     Echo 1/22/2020:   Technically difficult examination secondary to habitus.   The left ventricular systolic function is severely reduced with an ejection   fraction of 25 %.   There is severe hypokinesis of the anterior and apical walls consistent with   infarction within the territory of the left anterior descending artery.   Grade II diastolic dysfunction with elevated left ventricular filling   pressure.   Moderate to severe mitral regurgitation.   Mild tricuspid regurgitation.   is estimated at 53 mmHg consistent with moderate pulmonary hypertension   (Right atrial pressure of 3 mmHg).   If clinically necessary, consider URI for better evaluation of mitral   regurgitation     Coronary angiogram 1/20/2020:  Dominance : Right  LM: 70-80% short distal stenosis  LAD: 70-80% short ostial stenosis, extending into takeoff of high first diagonal; large proximal to mid stented segment patent with jailed second

## 2020-03-23 NOTE — PROGRESS NOTES
promethazine **OR** ondansetron, glucose, dextrose, glucagon (rDNA), dextrose, albuterol      Intake/Output Summary (Last 24 hours) at 3/23/2020 1325  Last data filed at 3/23/2020 0805  Gross per 24 hour   Intake 1260 ml   Output 4125 ml   Net -2865 ml       Physical Exam Performed:    /70   Pulse 96   Temp 98.1 °F (36.7 °C) (Oral)   Resp 22   Ht 5' 4.5\" (1.638 m)   Wt 273 lb 12.8 oz (124.2 kg)   LMP 10/24/2012   SpO2 96%   BMI 46.27 kg/m²     General appearance:  No apparent distress, appears stated age and cooperative. HEENT:  Normal cephalic, atraumatic without obvious deformity. Pupils equal, round, and reactive to light. Extra ocular muscles intact. Conjunctivae/corneas clear. Neck: Supple, with full range of motion. No jugular venous distention. Trachea midline. Respiratory:  Normal respiratory effort. Clear to auscultation, bilaterally without Rales/Wheezes/Rhonchi. Cardiovascular:  Regular rate and rhythm with normal S1/S2 without murmurs, rubs or gallops. Abdomen: Soft, non-tender, non-distended with normal bowel sounds. Musculoskeletal:  No clubbing, cyanosis. Marked LE edema bilaterally. Full range of motion without deformity. Skin: Skin color, texture, turgor normal.  No rashes or lesions. Neurologic:  Neurovascularly intact without any focal sensory/motor deficits.  Cranial nerves: II-XII intact, grossly non-focal.  Psychiatric:  Alert and oriented, thought content appropriate, normal insight  Capillary Refill: Brisk,< 3 seconds   Peripheral Pulses: +2 palpable, equal bilaterally     Labs:   Recent Labs     03/21/20 0414 03/23/20 0456   WBC 14.6* 7.6   HGB 9.8* 9.1*   HCT 30.6* 29.0*    282     Recent Labs     03/21/20 0414 03/22/20 0450 03/23/20 0456   * 129* 131*   K 4.9 4.0 3.9   CL 91* 90* 91*   CO2 24 25 26   BUN 92* 93* 82*   CREATININE 3.3* 2.8* 2.1*   CALCIUM 8.6 8.9 9.3   PHOS 6.4* 5.3* 4.2     No results for input(s): AST, ALT, BILIDIR, BILITOT, ALKPHOS

## 2020-03-23 NOTE — PROGRESS NOTES
[facility]       Progress Note           Subjective/   62y.o. year old female who we are seeing in consultation for A/CKD. HPI:  Renal function improving off diuretics, urine output of 4.1 liters over last 24 hours. Still with sob with exertion. ROS:  Intake adequate. Objective/   GEN:  Chronically ill, /70   Pulse 96   Temp 98.1 °F (36.7 °C) (Oral)   Resp 22   Ht 5' 4.5\" (1.638 m)   Wt 273 lb 12.8 oz (124.2 kg)   LMP 10/24/2012   SpO2 96%   BMI 46.27 kg/m²   HEENT: non-icteric, no JVD  CV: S1, S2 without m/r/g; ++ LE edema in wraps  RESP: CTA B without w/r/r; breathing wnl  ABD: +bs, soft, nt, no hsm  SKIN: warm, no rashes    Data/  Recent Labs     03/21/20  0414 03/23/20  0456   WBC 14.6* 7.6   HGB 9.8* 9.1*   HCT 30.6* 29.0*   MCV 86.8 86.4    282     Recent Labs     03/21/20  0414 03/22/20  0450 03/23/20  0456   * 129* 131*   K 4.9 4.0 3.9   CL 91* 90* 91*   CO2 24 25 26   GLUCOSE 112* 99 113*   PHOS 6.4* 5.3* 4.2   BUN 92* 93* 82*   CREATININE 3.3* 2.8* 2.1*   LABGLOM 14* 17* 24*   GFRAA 17* 21* 29*       Assessment/     - A/CKD: The patient has chronic kidney disease with a baseline creatinine of 1.5 to 1.7. Her SHAUNNA is likely secondary to a non oliguric ATN in the setting of hypotension and renal hypoperfusion. Her urinary output is maintained and her serum creatinine is slowly trending down; continue to hold all diuretics and onitor off of IV fluids.    - Edema: Continue with ace wraps as tolerated. Will need to resume torsemide for volume control shortly. - Hyponatremia: Stable s/p IV lasix, continue daily free water restriction of 1 liter/day. - Hyperkalemia: Resolved s/p medical therapy. - Hypotension: Blood pressure improving with scheduled Midodrine and IV Albumin. - Anemia: Stable hemoglobin, monitor.

## 2020-03-23 NOTE — PLAN OF CARE
Problem: Falls - Risk of:  Goal: Will remain free from falls  Description: Will remain free from falls  Outcome: Ongoing  Goal: Absence of physical injury  Description: Absence of physical injury  Outcome: Ongoing     Problem: OXYGENATION/RESPIRATORY FUNCTION  Goal: Patient will maintain patent airway  Outcome: Ongoing  Goal: Patient will achieve/maintain normal respiratory rate/effort  Description: Respiratory rate and effort will be within normal limits for the patient  Outcome: Ongoing     Problem: HEMODYNAMIC STATUS  Goal: Patient has stable vital signs and fluid balance  Outcome: Ongoing     Problem: FLUID AND ELECTROLYTE IMBALANCE  Goal: Fluid and electrolyte balance are achieved/maintained  Outcome: Ongoing     Problem: ACTIVITY INTOLERANCE/IMPAIRED MOBILITY  Goal: Mobility/activity is maintained at optimum level for patient  Outcome: Ongoing     Problem:  Activity:  Goal: Risk for activity intolerance will decrease  Description: Risk for activity intolerance will decrease  Outcome: Ongoing     Problem: Coping:  Goal: Ability to adjust to condition or change in health will improve  Description: Ability to adjust to condition or change in health will improve  Outcome: Ongoing     Problem: Fluid Volume:  Goal: Ability to maintain a balanced intake and output will improve  Description: Ability to maintain a balanced intake and output will improve  Outcome: Ongoing     Problem: Health Behavior:  Goal: Ability to identify and utilize available resources and services will improve  Description: Ability to identify and utilize available resources and services will improve  Outcome: Ongoing  Goal: Ability to manage health-related needs will improve  Description: Ability to manage health-related needs will improve  Outcome: Ongoing     Problem: Metabolic:  Goal: Ability to maintain appropriate glucose levels will improve  Description: Ability to maintain appropriate glucose levels will improve  Outcome: Ongoing

## 2020-03-24 LAB
ALBUMIN SERPL-MCNC: 3.7 G/DL (ref 3.4–5)
ANION GAP SERPL CALCULATED.3IONS-SCNC: 12 MMOL/L (ref 3–16)
BUN BLDV-MCNC: 61 MG/DL (ref 7–20)
CALCIUM SERPL-MCNC: 9.1 MG/DL (ref 8.3–10.6)
CHLORIDE BLD-SCNC: 92 MMOL/L (ref 99–110)
CO2: 27 MMOL/L (ref 21–32)
CREAT SERPL-MCNC: 1.7 MG/DL (ref 0.6–1.1)
GFR AFRICAN AMERICAN: 38
GFR NON-AFRICAN AMERICAN: 31
GLUCOSE BLD-MCNC: 112 MG/DL (ref 70–99)
GLUCOSE BLD-MCNC: 115 MG/DL (ref 70–99)
GLUCOSE BLD-MCNC: 120 MG/DL (ref 70–99)
GLUCOSE BLD-MCNC: 157 MG/DL (ref 70–99)
GLUCOSE BLD-MCNC: 158 MG/DL (ref 70–99)
HCT VFR BLD CALC: 27.5 % (ref 36–48)
HEMOGLOBIN: 9 G/DL (ref 12–16)
MCH RBC QN AUTO: 28.2 PG (ref 26–34)
MCHC RBC AUTO-ENTMCNC: 32.8 G/DL (ref 31–36)
MCV RBC AUTO: 86.2 FL (ref 80–100)
PDW BLD-RTO: 18.7 % (ref 12.4–15.4)
PERFORMED ON: ABNORMAL
PHOSPHORUS: 3 MG/DL (ref 2.5–4.9)
PLATELET # BLD: 264 K/UL (ref 135–450)
PMV BLD AUTO: 8.6 FL (ref 5–10.5)
POTASSIUM SERPL-SCNC: 3.6 MMOL/L (ref 3.5–5.1)
RBC # BLD: 3.19 M/UL (ref 4–5.2)
SODIUM BLD-SCNC: 131 MMOL/L (ref 136–145)
WBC # BLD: 7.5 K/UL (ref 4–11)

## 2020-03-24 PROCEDURE — 2060000000 HC ICU INTERMEDIATE R&B

## 2020-03-24 PROCEDURE — 99233 SBSQ HOSP IP/OBS HIGH 50: CPT | Performed by: NURSE PRACTITIONER

## 2020-03-24 PROCEDURE — 6370000000 HC RX 637 (ALT 250 FOR IP): Performed by: THORACIC SURGERY (CARDIOTHORACIC VASCULAR SURGERY)

## 2020-03-24 PROCEDURE — 2580000003 HC RX 258: Performed by: INTERNAL MEDICINE

## 2020-03-24 PROCEDURE — 6370000000 HC RX 637 (ALT 250 FOR IP): Performed by: INTERNAL MEDICINE

## 2020-03-24 PROCEDURE — 6370000000 HC RX 637 (ALT 250 FOR IP): Performed by: NURSE PRACTITIONER

## 2020-03-24 PROCEDURE — 6360000002 HC RX W HCPCS: Performed by: INTERNAL MEDICINE

## 2020-03-24 PROCEDURE — 85027 COMPLETE CBC AUTOMATED: CPT

## 2020-03-24 PROCEDURE — 94640 AIRWAY INHALATION TREATMENT: CPT

## 2020-03-24 PROCEDURE — 80069 RENAL FUNCTION PANEL: CPT

## 2020-03-24 RX ADMIN — Medication 10 ML: at 08:34

## 2020-03-24 RX ADMIN — MIDODRINE HYDROCHLORIDE 10 MG: 5 TABLET ORAL at 11:58

## 2020-03-24 RX ADMIN — BUSPIRONE HYDROCHLORIDE 30 MG: 5 TABLET ORAL at 20:10

## 2020-03-24 RX ADMIN — BENZTROPINE MESYLATE 1 MG: 1 TABLET ORAL at 21:15

## 2020-03-24 RX ADMIN — ASPIRIN 81 MG: 81 TABLET, COATED ORAL at 08:33

## 2020-03-24 RX ADMIN — HEPARIN SODIUM 5000 UNITS: 5000 INJECTION INTRAVENOUS; SUBCUTANEOUS at 14:32

## 2020-03-24 RX ADMIN — MIDODRINE HYDROCHLORIDE 10 MG: 5 TABLET ORAL at 17:23

## 2020-03-24 RX ADMIN — HEPARIN SODIUM 5000 UNITS: 5000 INJECTION INTRAVENOUS; SUBCUTANEOUS at 21:15

## 2020-03-24 RX ADMIN — TIOTROPIUM BROMIDE INHALATION SPRAY 2 PUFF: 3.12 SPRAY, METERED RESPIRATORY (INHALATION) at 08:19

## 2020-03-24 RX ADMIN — BUPRENORPHINE AND NALOXONE 1 FILM: 8; 2 FILM BUCCAL; SUBLINGUAL at 08:32

## 2020-03-24 RX ADMIN — INSULIN LISPRO 8 UNITS: 100 INJECTION, SOLUTION INTRAVENOUS; SUBCUTANEOUS at 11:59

## 2020-03-24 RX ADMIN — HEPARIN SODIUM 5000 UNITS: 5000 INJECTION INTRAVENOUS; SUBCUTANEOUS at 05:24

## 2020-03-24 RX ADMIN — MIDODRINE HYDROCHLORIDE 10 MG: 5 TABLET ORAL at 08:33

## 2020-03-24 RX ADMIN — INSULIN LISPRO 1 UNITS: 100 INJECTION, SOLUTION INTRAVENOUS; SUBCUTANEOUS at 21:15

## 2020-03-24 RX ADMIN — LAMOTRIGINE 150 MG: 100 TABLET ORAL at 08:33

## 2020-03-24 RX ADMIN — FUROSEMIDE 10 MG/HR: 10 INJECTION, SOLUTION INTRAMUSCULAR; INTRAVENOUS at 23:52

## 2020-03-24 RX ADMIN — INSULIN LISPRO 2 UNITS: 100 INJECTION, SOLUTION INTRAVENOUS; SUBCUTANEOUS at 11:59

## 2020-03-24 RX ADMIN — FUROSEMIDE 10 MG/HR: 10 INJECTION, SOLUTION INTRAMUSCULAR; INTRAVENOUS at 13:36

## 2020-03-24 RX ADMIN — AMOXICILLIN AND CLAVULANATE POTASSIUM 1 TABLET: 875; 125 TABLET, FILM COATED ORAL at 21:14

## 2020-03-24 RX ADMIN — ATORVASTATIN CALCIUM 80 MG: 80 TABLET, FILM COATED ORAL at 21:15

## 2020-03-24 RX ADMIN — CLOPIDOGREL BISULFATE 75 MG: 75 TABLET ORAL at 08:33

## 2020-03-24 RX ADMIN — ARIPIPRAZOLE 10 MG: 10 TABLET ORAL at 21:14

## 2020-03-24 RX ADMIN — BUSPIRONE HYDROCHLORIDE 30 MG: 5 TABLET ORAL at 08:32

## 2020-03-24 RX ADMIN — LAMOTRIGINE 150 MG: 100 TABLET ORAL at 21:14

## 2020-03-24 RX ADMIN — AMOXICILLIN AND CLAVULANATE POTASSIUM 1 TABLET: 875; 125 TABLET, FILM COATED ORAL at 08:33

## 2020-03-24 RX ADMIN — PANTOPRAZOLE SODIUM 40 MG: 40 TABLET, DELAYED RELEASE ORAL at 08:33

## 2020-03-24 RX ADMIN — PANTOPRAZOLE SODIUM 40 MG: 40 TABLET, DELAYED RELEASE ORAL at 17:23

## 2020-03-24 RX ADMIN — BUPRENORPHINE AND NALOXONE 1 FILM: 8; 2 FILM BUCCAL; SUBLINGUAL at 21:15

## 2020-03-24 ASSESSMENT — PAIN SCALES - GENERAL
PAINLEVEL_OUTOF10: 0
PAINLEVEL_OUTOF10: 0

## 2020-03-24 NOTE — PROGRESS NOTES
[address]   Daily Progress Note    Admit Date:  3/20/2020  HPI:  No chief complaint on file. Interval history: admitted 3/20/2020 for hypotension, weight gain and edema. Also treated for A/CKD. CT surgery following for sternal wound infection. Subjective:  Ms. Remy Grimes continues with edema. Weight is up a few lbs. Legs not weeping today. Complains of shortness of breath. Objective:   BP (!) 90/59   Pulse 96   Temp 98.5 °F (36.9 °C) (Oral)   Resp 22   Ht 5' 4.5\" (1.638 m)   Wt 276 lb 4.8 oz (125.3 kg)   LMP 10/24/2012   SpO2 94%   BMI 46.69 kg/m²       Intake/Output Summary (Last 24 hours) at 3/24/2020 0851  Last data filed at 3/24/2020 9749  Gross per 24 hour   Intake 1140 ml   Output 3000 ml   Net -1860 ml       NYHA: IV    Physical Exam:  General:  Awake, alert, NAD, obese  Skin:  Warm and dry  Neck:  JVD difficult to assess  Chest:  Clear to auscultation, no wheezes/rhonchi/rales  Cardiovascular:  RRR S1S2, no m/r/g, dressing in place on anterior chest   Abdomen:  Soft, nontender, +bowel sounds  Extremities:  + BLE edema., + flank edema. Legs wrapped.      Medications:    polyethylene glycol  17 g Oral Daily    amoxicillin-clavulanate  1 tablet Oral 2 times per day    ARIPiprazole  10 mg Oral Daily    aspirin EC  81 mg Oral Daily    atorvastatin  80 mg Oral Nightly    benztropine  1 mg Oral Nightly    buprenorphine-naloxone  1 Film Sublingual BID    busPIRone  30 mg Oral BID    clopidogrel  75 mg Oral Daily    lamoTRIgine  150 mg Oral BID    pantoprazole  40 mg Oral BID    tiotropium  2 puff Inhalation Daily    sodium chloride flush  10 mL Intravenous 2 times per day    insulin lispro  8 Units Subcutaneous TID WC    insulin lispro  0-12 Units Subcutaneous TID WC    insulin lispro  0-6 Units Subcutaneous Nightly    heparin (porcine)  5,000 Units Subcutaneous 3 times per day    midodrine  10 mg Oral TID WC      dextrose         Lab Data:  CBC:   Recent Labs 03/23/20 0456 03/24/20 0433   WBC 7.6 7.5   HGB 9.1* 9.0*    264     BMP:    Recent Labs     03/22/20 0450 03/23/20 0456 03/24/20 0433   * 131* 131*   K 4.0 3.9 3.6   CO2 25 26 27   BUN 93* 82* 61*   CREATININE 2.8* 2.1* 1.7*     INR:  No results for input(s): INR in the last 72 hours. BNP:    Recent Labs     03/23/20 0456   PROBNP 4,372*     Lab Results   Component Value Date    LVEF 38 01/24/2020     CABG 1/27/2020:  Urgent CABG X 1, with pedicled LIMA to LAD, SGC, CPB with On pump-beating heart, URI, Epiaortic ultrasound, Doppler verification of grafts, Bilateral 5 level intercostal nerve block(Exparel), Platelet gel application.   Sternal plating.  Vas Cath placement.     URI 1/24/2020:  Ejection fraction is visually estimated at 35-40%.   Moderate to severe mitral regurgitation with multiple jets visualized.   ERO estimated at 0.29 cm2.   Mild tricuspid regurgitation.   The left atrial appendage shows evidence of thrombus formation and low flow   velocities.   Moderate amount of plaque in the aorta.     Echo 1/22/2020:   Technically difficult examination secondary to habitus.   The left ventricular systolic function is severely reduced with an ejection   fraction of 25 %.   There is severe hypokinesis of the anterior and apical walls consistent with   infarction within the territory of the left anterior descending artery.   Grade II diastolic dysfunction with elevated left ventricular filling   pressure.   Moderate to severe mitral regurgitation.   Mild tricuspid regurgitation.   is estimated at 53 mmHg consistent with moderate pulmonary hypertension   (Right atrial pressure of 3 mmHg).   If clinically necessary, consider URI for better evaluation of mitral   regurgitation     Coronary angiogram 1/20/2020:  Dominance : Right  LM: 70-80% short distal stenosis  LAD: 70-80% short ostial stenosis, extending into takeoff of high first diagonal; large proximal to mid stented segment patent with jailed second diagonal  (80% ostial D2) and 70% in stent restenosis of most distal stent; distal to near apical LAD with minimal disease  LCx: 70-80% short ostial stenosis, prior to patent proximal to mid stents   RCA: large, dominant vessel with long 60% proximal to mid stenosis   LVEDP: 4 mmHG  LVG not done due to CKD.     Diabetic with previous LAD and LCx stenting in 2018 returns with unstable angina, in stent restenosis and Left Main bifurcation disease. Recommend CTS evaluation inpatient to discuss surgical revascularization option. Hold Clopidogrel.     03/23/20 0430  273 lb 12.8 oz (124.2 kg)  Standing scale     03/22/20 0421  276 lb 6.4 oz (125.4 kg)  Standing scale     03/21/20 0536  274 lb 7.6 oz (124.5 kg)  Bed scale     03/20/20 1945  282 lb 3 oz (128 kg)  Bed scale     Active Problems:    CHF (congestive heart failure), NYHA class I, acute on chronic, combined (HCC)    Hypotension    Pulmonary hypertension ([address]  Resolved Problems:    * No resolved hospital problems. *      Assessment/Plan:  ~Hypotension  ~Edema, anasarca  ~Chronic systolic heart failure  ~Ischemic cardiomyopathy; s/p cardiomems implant 2/2019  ~CAD s/p LIMA-LAD 1/27/20  ~mitral regurg  ~SHAUNNA on CKD  ~Sternal wound infection - per CT surgery  ~CLINT    cardiomems reading performed by me from 3/24/2020        Plan:  Add IV lasix infusion  5mg/hr if okay with nephrology   Holding BB and ACEi due to hypotension and SHAUNNA  Continue aspirin and plavix.    On midodrine     Dante Villa CNP, 3/24/2020, 10:49 AM

## 2020-03-24 NOTE — PROGRESS NOTES
CVTS Thoracic Progress Note:          CC:  Pt known to service, CABG X1 with sternal plating 1/27/20    Subj: awake, up in chair, in NAD, smiling and talkative. Obj:    Blood pressure (!) 90/59, pulse 96, temperature 98.5 °F (36.9 °C), temperature source Oral, resp. rate 22, height 5' 4.5\" (1.638 m), weight 276 lb 4.8 oz (125.3 kg), last menstrual period 10/24/2012, SpO2 94 %, not currently breastfeeding. Lungs CTAB   Abdomen round, soft, non-tender   Incision with minimal drainage, no erythema. Diagnostics:   Recent Labs     03/23/20  0456 03/24/20  0433   WBC 7.6 7.5   HGB 9.1* 9.0*   HCT 29.0* 27.5*    264                                                                  Recent Labs     03/22/20  0450 03/23/20  0456 03/24/20  0433   * 131* 131*   K 4.0 3.9 3.6   CL 90* 91* 92*   CO2 25 26 27   BUN 93* 82* 61*   CREATININE 2.8* 2.1* 1.7*   GLUCOSE 99 113* 112*     CT chest 3/21/20   Impression   Post median sternotomy changes, minor induration and small amount of gas or   air along the sternotomy wound.  No fluid collection to indicate abscess.       Cardiomegaly with small left pleural effusion and left basilar atelectasis or   developing infection.  No pulmonary edema. CXR: 3/20/20   Impression   Stable chest x-ray with no acute disease.  Status post thoracotomy with CABG   changes.  Stable cardiomegaly.  No evidence of CHF or pneumonia. Assess/Plan:   Care per primary team    Sternal wound:   Noncontrast scan of the chest shows well reapproximated sternotomy,  not concerning for deep sternal wound infection. Continue daily wound care, painting with betadine. Cultures from 3/21 with NGTD. Prior culture 3/4/20 grew Corynebacterium (likely contaminant). Will continue Augmentin for seven day course. Pt remains afebrile with a WBC of 7.5  We will sign off at this time.    Pt to keep follow up appointment with Dr. Gay Dillard on 3/31 at 11:30 in

## 2020-03-24 NOTE — ADT AUTH CERT
The patient has chronic kidney disease with a baseline creatinine of 1.5 to 1.7. Her SHAUNNA is likely secondary to a non oliguric ATN in the setting of hypotension and renal hypoperfusion. Trial of lasix infusion       - Edema: Continue with ace wraps as tolerated. Trial of lasix infusion.       - Hyponatremia: Stable s/p IV lasix, continue daily free water restriction of 1 liter/day.       - Hyperkalemia: Resolved s/p medical therapy.       - Hypotension: Blood pressure improving with scheduled Midodrine and IV Albumin.       - Anemia: Stable hemoglobin, monitor.       Per IM PN:  Assessment/Plan:       Active Hospital Problems     Diagnosis   · Hypotension [I95.9]   · Pulmonary hypertension (HCC) [I27.20]   · CHF (congestive heart failure), NYHA class I, acute on chronic, combined ([facility] [address] [I50.43]           Acute on chronic systolic heart failure   - marked weight gain, volume overload noted, elevated BNP on admission   - last echo 1/20 with EF 25%, severe anterior hypokinesis, grade II diastolic dysfunction   - currently on lasix holiday   - cardiology consulted/following, recommending lasix drip if okay with nephro   - due to hypotension, unable to tolerate BB, ACEi/ARB   - daily weights, close I's and O's       SHAUNNA with baseline CKD stage III   - likely ATN from persistent hypotension and renal hypoperfusion   - improving on lasix holiday   - nephrology consulted/following   - goal BP >100   - continue to monitor closely       Hypotension   - likely 2/2 heart failure   - no improvement with IVF in ED   - improving with midodrine       DMII   - well controlled, A1C 5.8   - continue SSI ACHS with prandial insulin       CAD s/p CABG in Jan 2020   - no active chest pain   - continue home ASA, plavix, statin   - continue augmentin for recent sternal wound infection       COPD   - no evidence of exacerbation   - continue home inhalers       HLD   - continue home statin       Opioid dependence   - continue home suboxone     * Oxygen absent    * Milestone   Additional Notes   3/23  Day 4      Pt remains on progressive care unit      Vitals: t: 36.7 p: 88 rr: 22 bp: 101/66 spo2: 98% ra      Abn labs:  hgb: 9.1, bnp: 4372, na: 131, bun: 82, cr: 2.1      Orders: Accu checks ac/hs with ssi coverage   Heparin sq tid   Bmp, cbc, renal function panel daily      Per nephrology  PN:  Assessment/       - A/CKD: The patient has chronic kidney disease with a baseline creatinine of 1.5 to 1.7. Her SHAUNNA is likely secondary to a non oliguric ATN in the setting of hypotension and renal hypoperfusion. Her urinary output is maintained and her serum creatinine is slowly trending down; continue to hold all diuretics and onitor off of IV fluids.       - Edema: Continue with ace wraps as tolerated. Will need to resume torsemide for volume control shortly.       - Hyponatremia: Stable s/p IV lasix, continue daily free water restriction of 1 liter/day.       - Hyperkalemia: Resolved s/p medical therapy.       - Hypotension: Blood pressure improving with scheduled Midodrine and IV Albumin.       - Anemia: Stable hemoglobin, monitor.       Per IM PN:  Assessment/Plan:       Active Hospital Problems     Diagnosis   · Hypotension [I95.9]   · Pulmonary hypertension (HCC) [I27.20]   · CHF (congestive heart failure), NYHA class I, acute on chronic, combined ([facility] [address] [I50.43]       Acute on chronic systolic heart failure   - marked weight gain, volume overload noted, elevated BNP on admission   - last echo 1/20 with EF 25%, severe anterior hypokinesis, grade II diastolic dysfunction   - currently on lasix holiday   - cardiology consulted/following   - due to hypotension, unable to tolerate BB, ACEi/ARB   - daily weights, close I's and O's       SHAUNNA with baseline CKD stage III   - likely ATN from persistent hypotension and renal hypoperfusion   - improving on lasix holiday   - nephrology consulted/following   - goal BP >100   - continue to monitor closely

## 2020-03-24 NOTE — PROGRESS NOTES
Urinalysis:      Lab Results   Component Value Date    NITRU Negative 03/10/2020    WBCUA 3-5 09/28/2019    BACTERIA Rare 09/28/2019    RBCUA None seen 09/28/2019    BLOODU Negative 03/10/2020    SPECGRAV 1.010 03/10/2020    GLUCOSEU Negative 03/10/2020       Radiology:  CT CHEST WO CONTRAST   Final Result   Post median sternotomy changes, minor induration and small amount of gas or   air along the sternotomy wound. No fluid collection to indicate abscess. Cardiomegaly with small left pleural effusion and left basilar atelectasis or   developing infection. No pulmonary edema.              Assessment/Plan:    Active Hospital Problems    Diagnosis    Hypotension [I95.9]    Pulmonary hypertension ([facility] [address] [I27.20]    CHF (congestive heart failure), NYHA class I, acute on chronic, combined (HCC) [I50.43]       Acute on chronic systolic heart failure  - marked weight gain, volume overload noted, elevated BNP on admission  - last echo 1/20 with EF 25%, severe anterior hypokinesis, grade II diastolic dysfunction  - currently on lasix holiday  - cardiology consulted/following, recommending lasix drip if okay with nephro  - due to hypotension, unable to tolerate BB, ACEi/ARB  - daily weights, close I's and O's     SHAUNNA with baseline CKD stage III  - likely ATN from persistent hypotension and renal hypoperfusion  - improving on lasix holiday  - nephrology consulted/following  - goal BP >100  - continue to monitor closely     Hypotension  - likely 2/2 heart failure  - no improvement with IVF in ED  - improving with midodrine     DMII  - well controlled, A1C 5.8  - continue SSI ACHS with prandial insulin     CAD s/p CABG in Jan 2020  - no active chest pain  - continue home ASA, plavix, statin  - continue augmentin for recent sternal wound infection     COPD  - no evidence of exacerbation  - continue home inhalers     HLD  - continue home statin     Opioid dependence  - continue home suboxone     Morbid obesity  - with

## 2020-03-24 NOTE — PLAN OF CARE
RN  Outcome: Ongoing  Goal: Diagnostic test results will improve  Description: Diagnostic test results will improve  3/24/2020 0252 by Cathy Marr RN  Outcome: Ongoing     Problem: Skin Integrity:  Goal: Risk for impaired skin integrity will decrease  Description: Risk for impaired skin integrity will decrease  3/24/2020 1013 by Corie Rios RN  Outcome: Ongoing  3/24/2020 0252 by Cathy Marr RN  Outcome: Ongoing  Note: Diabetes education provided today:    Diabetic Neuropathy: signs and therapy. Foot care: advised to wash feet daily, pat dry and apply lotion at night, avoiding between toes. Need to look at feet daily and report to a physician any signs of inflammation or skin damage. Discussed diabetes shoes and socks. Diabetic nephropathy: Kidney function, microalbumin as a sign of diabetic nephropathy. Stages of kidney disease. Carbs: good carbs and bad carbs, importance of carb counting, incorporation of protein with each meal to reduce Glycemic index, importance of portions, Carb/insulin ratio. Different diabetic medications. Rotation of sites for subcutaneous medication injection.       Problem: Tissue Perfusion:  Goal: Adequacy of tissue perfusion will improve  Description: Adequacy of tissue perfusion will improve  3/24/2020 0252 by Cathy Marr RN  Outcome: Ongoing     Problem: Pain:  Goal: Pain level will decrease  Description: Pain level will decrease  3/24/2020 0252 by Cathy aMrr RN  Outcome: Ongoing  Goal: Control of acute pain  Description: Control of acute pain  3/24/2020 0252 by Cathy Marr RN  Outcome: Ongoing  Goal: Control of chronic pain  Description: Control of chronic pain  3/24/2020 0252 by Cathy Marr RN  Outcome: Ongoing

## 2020-03-25 PROBLEM — I95.9 HYPOTENSION: Status: RESOLVED | Noted: 2018-05-23 | Resolved: 2020-03-24

## 2020-03-25 PROBLEM — N17.9 AKI (ACUTE KIDNEY INJURY) (HCC): Status: RESOLVED | Noted: 2019-09-29 | Resolved: 2020-03-24

## 2020-03-25 LAB
ALBUMIN SERPL-MCNC: 3.7 G/DL (ref 3.4–5)
ANION GAP SERPL CALCULATED.3IONS-SCNC: 14 MMOL/L (ref 3–16)
ANION GAP SERPL CALCULATED.3IONS-SCNC: 14 MMOL/L (ref 3–16)
BUN BLDV-MCNC: 50 MG/DL (ref 7–20)
BUN BLDV-MCNC: 54 MG/DL (ref 7–20)
CALCIUM SERPL-MCNC: 9.1 MG/DL (ref 8.3–10.6)
CALCIUM SERPL-MCNC: 9.4 MG/DL (ref 8.3–10.6)
CHLORIDE BLD-SCNC: 87 MMOL/L (ref 99–110)
CHLORIDE BLD-SCNC: 91 MMOL/L (ref 99–110)
CO2: 28 MMOL/L (ref 21–32)
CO2: 30 MMOL/L (ref 21–32)
CREAT SERPL-MCNC: 1.4 MG/DL (ref 0.6–1.1)
CREAT SERPL-MCNC: 1.5 MG/DL (ref 0.6–1.1)
GFR AFRICAN AMERICAN: 43
GFR AFRICAN AMERICAN: 47
GFR NON-AFRICAN AMERICAN: 36
GFR NON-AFRICAN AMERICAN: 39
GLUCOSE BLD-MCNC: 113 MG/DL (ref 70–99)
GLUCOSE BLD-MCNC: 116 MG/DL (ref 70–99)
GLUCOSE BLD-MCNC: 125 MG/DL (ref 70–99)
GLUCOSE BLD-MCNC: 140 MG/DL (ref 70–99)
GLUCOSE BLD-MCNC: 164 MG/DL (ref 70–99)
GLUCOSE BLD-MCNC: 88 MG/DL (ref 70–99)
HCT VFR BLD CALC: 29.2 % (ref 36–48)
HEMOGLOBIN: 9.5 G/DL (ref 12–16)
MCH RBC QN AUTO: 28.1 PG (ref 26–34)
MCHC RBC AUTO-ENTMCNC: 32.5 G/DL (ref 31–36)
MCV RBC AUTO: 86.4 FL (ref 80–100)
PDW BLD-RTO: 18.9 % (ref 12.4–15.4)
PERFORMED ON: ABNORMAL
PERFORMED ON: NORMAL
PHOSPHORUS: 3.4 MG/DL (ref 2.5–4.9)
PLATELET # BLD: 287 K/UL (ref 135–450)
PMV BLD AUTO: 8.5 FL (ref 5–10.5)
POTASSIUM REFLEX MAGNESIUM: 3.6 MMOL/L (ref 3.5–5.1)
POTASSIUM SERPL-SCNC: 2.8 MMOL/L (ref 3.5–5.1)
POTASSIUM SERPL-SCNC: 3 MMOL/L (ref 3.5–5.1)
PRO-BNP: 3596 PG/ML (ref 0–124)
RBC # BLD: 3.38 M/UL (ref 4–5.2)
SODIUM BLD-SCNC: 131 MMOL/L (ref 136–145)
SODIUM BLD-SCNC: 133 MMOL/L (ref 136–145)
WBC # BLD: 9.4 K/UL (ref 4–11)

## 2020-03-25 PROCEDURE — 6370000000 HC RX 637 (ALT 250 FOR IP): Performed by: REGISTERED NURSE

## 2020-03-25 PROCEDURE — 2580000003 HC RX 258: Performed by: INTERNAL MEDICINE

## 2020-03-25 PROCEDURE — 36415 COLL VENOUS BLD VENIPUNCTURE: CPT

## 2020-03-25 PROCEDURE — 2580000003 HC RX 258: Performed by: NURSE PRACTITIONER

## 2020-03-25 PROCEDURE — 84132 ASSAY OF SERUM POTASSIUM: CPT

## 2020-03-25 PROCEDURE — 2060000000 HC ICU INTERMEDIATE R&B

## 2020-03-25 PROCEDURE — 6360000002 HC RX W HCPCS: Performed by: INTERNAL MEDICINE

## 2020-03-25 PROCEDURE — 6370000000 HC RX 637 (ALT 250 FOR IP): Performed by: HOSPITALIST

## 2020-03-25 PROCEDURE — 94640 AIRWAY INHALATION TREATMENT: CPT

## 2020-03-25 PROCEDURE — 83880 ASSAY OF NATRIURETIC PEPTIDE: CPT

## 2020-03-25 PROCEDURE — 6370000000 HC RX 637 (ALT 250 FOR IP): Performed by: INTERNAL MEDICINE

## 2020-03-25 PROCEDURE — 99233 SBSQ HOSP IP/OBS HIGH 50: CPT | Performed by: NURSE PRACTITIONER

## 2020-03-25 PROCEDURE — 80069 RENAL FUNCTION PANEL: CPT

## 2020-03-25 PROCEDURE — 6370000000 HC RX 637 (ALT 250 FOR IP): Performed by: NURSE PRACTITIONER

## 2020-03-25 PROCEDURE — 6360000002 HC RX W HCPCS: Performed by: NURSE PRACTITIONER

## 2020-03-25 PROCEDURE — 85027 COMPLETE CBC AUTOMATED: CPT

## 2020-03-25 RX ORDER — POTASSIUM CHLORIDE 20 MEQ/1
40 TABLET, EXTENDED RELEASE ORAL EVERY 4 HOURS
Status: COMPLETED | OUTPATIENT
Start: 2020-03-25 | End: 2020-03-25

## 2020-03-25 RX ORDER — POTASSIUM CHLORIDE 20 MEQ/1
40 TABLET, EXTENDED RELEASE ORAL
Status: COMPLETED | OUTPATIENT
Start: 2020-03-25 | End: 2020-03-25

## 2020-03-25 RX ADMIN — MIDODRINE HYDROCHLORIDE 10 MG: 5 TABLET ORAL at 17:44

## 2020-03-25 RX ADMIN — BUPRENORPHINE AND NALOXONE 1 FILM: 8; 2 FILM BUCCAL; SUBLINGUAL at 20:20

## 2020-03-25 RX ADMIN — POTASSIUM CHLORIDE 40 MEQ: 20 TABLET, EXTENDED RELEASE ORAL at 18:28

## 2020-03-25 RX ADMIN — BUSPIRONE HYDROCHLORIDE 30 MG: 5 TABLET ORAL at 09:46

## 2020-03-25 RX ADMIN — LAMOTRIGINE 150 MG: 100 TABLET ORAL at 09:47

## 2020-03-25 RX ADMIN — INSULIN LISPRO 8 UNITS: 100 INJECTION, SOLUTION INTRAVENOUS; SUBCUTANEOUS at 18:30

## 2020-03-25 RX ADMIN — HEPARIN SODIUM 5000 UNITS: 5000 INJECTION INTRAVENOUS; SUBCUTANEOUS at 20:21

## 2020-03-25 RX ADMIN — TIOTROPIUM BROMIDE INHALATION SPRAY 2 PUFF: 3.12 SPRAY, METERED RESPIRATORY (INHALATION) at 08:08

## 2020-03-25 RX ADMIN — MIDODRINE HYDROCHLORIDE 10 MG: 5 TABLET ORAL at 09:46

## 2020-03-25 RX ADMIN — BENZTROPINE MESYLATE 1 MG: 1 TABLET ORAL at 20:20

## 2020-03-25 RX ADMIN — POTASSIUM CHLORIDE 40 MEQ: 1500 TABLET, EXTENDED RELEASE ORAL at 13:52

## 2020-03-25 RX ADMIN — HEPARIN SODIUM 5000 UNITS: 5000 INJECTION INTRAVENOUS; SUBCUTANEOUS at 06:00

## 2020-03-25 RX ADMIN — POTASSIUM CHLORIDE 40 MEQ: 20 TABLET, EXTENDED RELEASE ORAL at 22:01

## 2020-03-25 RX ADMIN — Medication 10 ML: at 20:21

## 2020-03-25 RX ADMIN — PANTOPRAZOLE SODIUM 40 MG: 40 TABLET, DELAYED RELEASE ORAL at 17:44

## 2020-03-25 RX ADMIN — POLYETHYLENE GLYCOL 3350 17 G: 17 POWDER, FOR SOLUTION ORAL at 09:48

## 2020-03-25 RX ADMIN — INSULIN LISPRO 2 UNITS: 100 INJECTION, SOLUTION INTRAVENOUS; SUBCUTANEOUS at 09:49

## 2020-03-25 RX ADMIN — ASPIRIN 81 MG: 81 TABLET, COATED ORAL at 09:46

## 2020-03-25 RX ADMIN — LAMOTRIGINE 150 MG: 100 TABLET ORAL at 20:19

## 2020-03-25 RX ADMIN — AMOXICILLIN AND CLAVULANATE POTASSIUM 1 TABLET: 875; 125 TABLET, FILM COATED ORAL at 09:46

## 2020-03-25 RX ADMIN — INSULIN LISPRO 8 UNITS: 100 INJECTION, SOLUTION INTRAVENOUS; SUBCUTANEOUS at 09:49

## 2020-03-25 RX ADMIN — BUSPIRONE HYDROCHLORIDE 30 MG: 5 TABLET ORAL at 20:19

## 2020-03-25 RX ADMIN — CLOPIDOGREL BISULFATE 75 MG: 75 TABLET ORAL at 09:46

## 2020-03-25 RX ADMIN — PANTOPRAZOLE SODIUM 40 MG: 40 TABLET, DELAYED RELEASE ORAL at 09:48

## 2020-03-25 RX ADMIN — AMOXICILLIN AND CLAVULANATE POTASSIUM 1 TABLET: 875; 125 TABLET, FILM COATED ORAL at 20:18

## 2020-03-25 RX ADMIN — INSULIN LISPRO 8 UNITS: 100 INJECTION, SOLUTION INTRAVENOUS; SUBCUTANEOUS at 13:54

## 2020-03-25 RX ADMIN — POTASSIUM CHLORIDE 40 MEQ: 20 TABLET, EXTENDED RELEASE ORAL at 20:19

## 2020-03-25 RX ADMIN — MIDODRINE HYDROCHLORIDE 10 MG: 5 TABLET ORAL at 13:52

## 2020-03-25 RX ADMIN — ARIPIPRAZOLE 10 MG: 10 TABLET ORAL at 20:20

## 2020-03-25 RX ADMIN — POTASSIUM CHLORIDE 40 MEQ: 1500 TABLET, EXTENDED RELEASE ORAL at 09:47

## 2020-03-25 RX ADMIN — HEPARIN SODIUM 5000 UNITS: 5000 INJECTION INTRAVENOUS; SUBCUTANEOUS at 13:53

## 2020-03-25 RX ADMIN — Medication 10 ML: at 09:47

## 2020-03-25 RX ADMIN — ATORVASTATIN CALCIUM 80 MG: 80 TABLET, FILM COATED ORAL at 20:18

## 2020-03-25 RX ADMIN — BUPRENORPHINE AND NALOXONE 1 FILM: 8; 2 FILM BUCCAL; SUBLINGUAL at 09:48

## 2020-03-25 RX ADMIN — FUROSEMIDE 5 MG/HR: 10 INJECTION, SOLUTION INTRAMUSCULAR; INTRAVENOUS at 18:58

## 2020-03-25 ASSESSMENT — PAIN SCALES - GENERAL
PAINLEVEL_OUTOF10: 0

## 2020-03-25 ASSESSMENT — PAIN SCALES - WONG BAKER
WONGBAKER_NUMERICALRESPONSE: 0

## 2020-03-25 ASSESSMENT — PAIN DESCRIPTION - PROGRESSION
CLINICAL_PROGRESSION: NOT CHANGED

## 2020-03-25 NOTE — PROGRESS NOTES
BUN 82* 61* 54*   CREATININE 2.1* 1.7* 1.5*   CALCIUM 9.3 9.1 9.1   PHOS 4.2 3.0 3.4     Urinalysis:      Lab Results   Component Value Date    NITRU Negative 03/10/2020    WBCUA 3-5 09/28/2019    BACTERIA Rare 09/28/2019    RBCUA None seen 09/28/2019    BLOODU Negative 03/10/2020    SPECGRAV 1.010 03/10/2020    GLUCOSEU Negative 03/10/2020       Radiology:  CT CHEST WO CONTRAST   Final Result   Post median sternotomy changes, minor induration and small amount of gas or   air along the sternotomy wound. No fluid collection to indicate abscess. Cardiomegaly with small left pleural effusion and left basilar atelectasis or   developing infection. No pulmonary edema.              Assessment/Plan:    Active Hospital Problems    Diagnosis    Hypotension [I95.9]    Pulmonary hypertension ([facility] [address] [I27.20]    CHF (congestive heart failure), NYHA class I, acute on chronic, combined ([facility]) [I50.43]       Acute on chronic systolic heart failure  - marked weight gain, volume overload noted, elevated BNP on admission  - last echo 1/20 with EF 25%, severe anterior hypokinesis, grade II diastolic dysfunction  - cardiology consulted/following, pt started on lasix drip on 3/24  - unable to tolerate BB, ACEi/ARB due to hypotension  - daily weights, close I's and O's, daily BMP     SHAUNNA with baseline CKD stage III  - likely ATN from persistent hypotension and renal hypoperfusion  - nephrology consulted/following  - goal BP >100  - continue to monitor closely  - SHAUNNA improved after holding lasix for 2 days     Hypotension  - likely 2/2 heart failure  - no improvement with IVF in ED  - improved with midodrine     DMII  - well controlled, A1C 5.8  - continue SSI ACHS with prandial insulin     CAD s/p CABG in Jan 2020  - no active chest pain  - continue home ASA, plavix, statin  - continue augmentin for recent sternal wound infection     COPD  - no evidence of exacerbation  - continue home inhalers     HLD  - continue home statin     Opioid dependence  - continue home suboxone     Morbid obesity  - with body mass index is 85.24 kg/m², complicating assessment and treatment. Placing patient at risk for multiple co-morbidities as well as early death and contributing to the patient's presentation, counseled on weight loss. DVT Prophylaxis: SQ heparin  Diet: DIET CARB CONTROL; Low Sodium (2 GM);  Daily Fluid Restriction: 1000 ml  Code Status: Full Code    PT/OT Eval Status: not indicated     Dispo - Uncertain     Kelli Mabry, APRN - CNP

## 2020-03-25 NOTE — PROGRESS NOTES
Mari served Dr. Dsouza Letters (cross cover) following secure message. .. \"FYI K+ 2.8 this morning. Replaced with  po potassium 80mEq total. K+ 3.0 at recheck after replacing.  No prn orders for replacement\"

## 2020-03-25 NOTE — PROGRESS NOTES
End of shift report given to [facility] at bedside. Patient alert and oriented. Bed in lowest position with wheels locked. Call light within reach, no further needs at this time.

## 2020-03-25 NOTE — PROGRESS NOTES
[facility]   Daily Progress Note    Admit Date:  3/20/2020  HPI:  No chief complaint on file. Interval history: admitted 3/20/2020 for hypotension, weight gain and edema. Also treated for A/CKD. CT surgery following for sternal wound infection. Subjective:  Ms. Yudy Grant feeling a little better, feet not as swollen as before. Still with anasarca. Sitting up in the chair today. Objective:   /73   Pulse 96   Temp 98 °F (36.7 °C) (Oral)   Resp 20   Ht 5' 4.5\" (1.638 m)   Wt 271 lb 14.4 oz (123.3 kg)   LMP 10/24/2012   SpO2 99%   BMI 45.95 kg/m²       Intake/Output Summary (Last 24 hours) at 3/25/2020 1148  Last data filed at 3/25/2020 0946  Gross per 24 hour   Intake 740 ml   Output 7950 ml   Net -7210 ml       NYHA: IV    Physical Exam:  General:  Awake, alert, NAD, obese  Skin:  Warm and dry  Neck:  JVD difficult to assess  Chest:  Clear to auscultation, no wheezes/rhonchi/rales  Cardiovascular:  RRR S1S2, no m/r/g, dressing in place on anterior chest   Abdomen:  Soft, nontender, +bowel sounds  Extremities:  + BLE edema., + flank edema. Legs wrapped.      Medications:    potassium chloride  40 mEq Oral Q4H    polyethylene glycol  17 g Oral Daily    amoxicillin-clavulanate  1 tablet Oral 2 times per day    ARIPiprazole  10 mg Oral Daily    aspirin EC  81 mg Oral Daily    atorvastatin  80 mg Oral Nightly    benztropine  1 mg Oral Nightly    buprenorphine-naloxone  1 Film Sublingual BID    busPIRone  30 mg Oral BID    clopidogrel  75 mg Oral Daily    lamoTRIgine  150 mg Oral BID    pantoprazole  40 mg Oral BID    tiotropium  2 puff Inhalation Daily    sodium chloride flush  10 mL Intravenous 2 times per day    insulin lispro  8 Units Subcutaneous TID WC    insulin lispro  0-12 Units Subcutaneous TID WC    insulin lispro  0-6 Units Subcutaneous Nightly    heparin (porcine)  5,000 Units Subcutaneous 3 times per day    midodrine  10 mg Oral TID WC      furosemide (LASIX) 1mg/ml infusion Stopped (03/25/20 0842)    dextrose         Lab Data:  CBC:   Recent Labs     03/23/20  0456 03/24/20  0433 03/25/20  0446   WBC 7.6 7.5 9.4   HGB 9.1* 9.0* 9.5*    264 287     BMP:    Recent Labs     03/23/20  0456 03/24/20  0433 03/25/20  0446   * 131* 131*   K 3.9 3.6 2.8*   CO2 26 27 30   BUN 82* 61* 54*   CREATININE 2.1* 1.7* 1.5*     INR:  No results for input(s): INR in the last 72 hours. BNP:    Recent Labs     03/23/20 0456 03/25/20  0944   PROBNP 4,372* 3,596*     Lab Results   Component Value Date    LVEF 38 01/24/2020     CABG 1/27/2020:  Urgent CABG X 1, with pedicled LIMA to LAD, SGC, CPB with On pump-beating heart, URI, Epiaortic ultrasound, Doppler verification of grafts, Bilateral 5 level intercostal nerve block(Exparel), Platelet gel application. Sternal plating.  Vas Cath placement.     URI 1/24/2020:  Ejection fraction is visually estimated at 35-40%.   Moderate to severe mitral regurgitation with multiple jets visualized.   ERO estimated at 0.29 cm2.   Mild tricuspid regurgitation.   The left atrial appendage shows evidence of thrombus formation and low flow   velocities.  Moderate amount of plaque in the aorta.     Echo 1/22/2020:   Technically difficult examination secondary to habitus.   The left ventricular systolic function is severely reduced with an ejection   fraction of 25 %.   There is severe hypokinesis of the anterior and apical walls consistent with   infarction within the territory of the left anterior descending artery.   Grade II diastolic dysfunction with elevated left ventricular filling   pressure.   Moderate to severe mitral regurgitation.   Mild tricuspid regurgitation.   is estimated at 53 mmHg consistent with moderate pulmonary hypertension   (Right atrial pressure of 3 mmHg).   If clinically necessary, consider URI for better evaluation of mitral   regurgitation     Coronary angiogram 1/20/2020:  Dominance : Right  LM: 70-80% short distal stenosis  LAD: 70-80% short ostial stenosis, extending into takeoff of high first diagonal; large proximal to mid stented segment patent with jailed second diagonal  (80% ostial D2) and 70% in stent restenosis of most distal stent; distal to near apical LAD with minimal disease  LCx: 70-80% short ostial stenosis, prior to patent proximal to mid stents   RCA: large, dominant vessel with long 60% proximal to mid stenosis   LVEDP: 4 mmHG  LVG not done due to CKD.     Diabetic with previous LAD and LCx stenting in 2018 returns with unstable angina, in stent restenosis and Left Main bifurcation disease. Recommend CTS evaluation inpatient to discuss surgical revascularization option. Hold Clopidogrel. 03/25/20 0514  271 lb 14.4 oz (123.3 kg)  Standing scale     03/24/20 0346  276 lb 4.8 oz (125.3 kg)  Standing scale     03/23/20 0430  273 lb 12.8 oz (124.2 kg)  Standing scale     03/22/20 0421  276 lb 6.4 oz (125.4 kg)  Standing scale     03/21/20 0536  274 lb 7.6 oz (124.5 kg)  Bed scale     03/20/20 1945  282 lb 3 oz (128 kg)  Bed scale       Active Problems:    CHF (congestive heart failure), NYHA class I, acute on chronic, combined (HCC)    Hypotension    Pulmonary hypertension (HCC)  Resolved Problems:    * No resolved hospital problems. *      Assessment/Plan:  ~Hypotension  ~Edema, anasarca  ~Chronic systolic heart failure  ~Ischemic cardiomyopathy; s/p cardiomems implant 2/2019  ~CAD s/p LIMA-LAD 1/27/20  ~mitral regurg  ~SHAUNNA on CKD  ~Sternal wound infection - per CT surgery  ~CLINT    cardiomems reading performed by me from 3/25/20      Plan:  Replace potasium  Lasix infusion on hold until later this evening and then Adjust lasix infusion to 5mg/hr later today to allow for fluid mobilization  Holding BB and ACEi due to hypotension and SHAUNNA  Continue aspirin and plavix.    On luis e Schaefer CNP, 3/25/2020, 1:31 PM

## 2020-03-25 NOTE — CARE COORDINATION
Case Management/Follow up:    Chart reviewed for length of stay. Hospital day # 5  Unit: C4   Diagnosis and current status as per MD progress: Replace potasium  Lasix infusion on hold until later this evening and then Adjust lasix infusion to 5mg/hr later today to allow for fluid mobilization  Anticipated d/c date: unknown   Expected plan for discharge: Patient is independent at home. CM is continuing to follow for any potential needs.    Potential barriers: none

## 2020-03-26 LAB
ALBUMIN SERPL-MCNC: 3.8 G/DL (ref 3.4–5)
ANION GAP SERPL CALCULATED.3IONS-SCNC: 14 MMOL/L (ref 3–16)
BUN BLDV-MCNC: 49 MG/DL (ref 7–20)
CALCIUM SERPL-MCNC: 9.7 MG/DL (ref 8.3–10.6)
CHLORIDE BLD-SCNC: 93 MMOL/L (ref 99–110)
CO2: 30 MMOL/L (ref 21–32)
CREAT SERPL-MCNC: 1.5 MG/DL (ref 0.6–1.1)
GFR AFRICAN AMERICAN: 43
GFR NON-AFRICAN AMERICAN: 36
GLUCOSE BLD-MCNC: 105 MG/DL (ref 70–99)
GLUCOSE BLD-MCNC: 108 MG/DL (ref 70–99)
GLUCOSE BLD-MCNC: 119 MG/DL (ref 70–99)
GLUCOSE BLD-MCNC: 141 MG/DL (ref 70–99)
GLUCOSE BLD-MCNC: 95 MG/DL (ref 70–99)
HCT VFR BLD CALC: 29 % (ref 36–48)
HEMOGLOBIN: 9.5 G/DL (ref 12–16)
MCH RBC QN AUTO: 28 PG (ref 26–34)
MCHC RBC AUTO-ENTMCNC: 32.8 G/DL (ref 31–36)
MCV RBC AUTO: 85.4 FL (ref 80–100)
PDW BLD-RTO: 19.3 % (ref 12.4–15.4)
PERFORMED ON: ABNORMAL
PERFORMED ON: NORMAL
PHOSPHORUS: 2.9 MG/DL (ref 2.5–4.9)
PLATELET # BLD: 329 K/UL (ref 135–450)
PMV BLD AUTO: 8.3 FL (ref 5–10.5)
POTASSIUM SERPL-SCNC: 3.7 MMOL/L (ref 3.5–5.1)
RBC # BLD: 3.39 M/UL (ref 4–5.2)
SODIUM BLD-SCNC: 137 MMOL/L (ref 136–145)
WBC # BLD: 8.8 K/UL (ref 4–11)

## 2020-03-26 PROCEDURE — 6360000002 HC RX W HCPCS: Performed by: INTERNAL MEDICINE

## 2020-03-26 PROCEDURE — 2580000003 HC RX 258: Performed by: NURSE PRACTITIONER

## 2020-03-26 PROCEDURE — 2500000003 HC RX 250 WO HCPCS: Performed by: REGISTERED NURSE

## 2020-03-26 PROCEDURE — 6370000000 HC RX 637 (ALT 250 FOR IP): Performed by: INTERNAL MEDICINE

## 2020-03-26 PROCEDURE — 80069 RENAL FUNCTION PANEL: CPT

## 2020-03-26 PROCEDURE — 36415 COLL VENOUS BLD VENIPUNCTURE: CPT

## 2020-03-26 PROCEDURE — 2580000003 HC RX 258: Performed by: INTERNAL MEDICINE

## 2020-03-26 PROCEDURE — 6370000000 HC RX 637 (ALT 250 FOR IP): Performed by: NURSE PRACTITIONER

## 2020-03-26 PROCEDURE — 2060000000 HC ICU INTERMEDIATE R&B

## 2020-03-26 PROCEDURE — 85027 COMPLETE CBC AUTOMATED: CPT

## 2020-03-26 PROCEDURE — 94640 AIRWAY INHALATION TREATMENT: CPT

## 2020-03-26 PROCEDURE — 6360000002 HC RX W HCPCS: Performed by: NURSE PRACTITIONER

## 2020-03-26 PROCEDURE — 99233 SBSQ HOSP IP/OBS HIGH 50: CPT | Performed by: NURSE PRACTITIONER

## 2020-03-26 RX ORDER — POTASSIUM CHLORIDE 20 MEQ/1
20 TABLET, EXTENDED RELEASE ORAL ONCE
Status: COMPLETED | OUTPATIENT
Start: 2020-03-26 | End: 2020-03-26

## 2020-03-26 RX ORDER — POTASSIUM CHLORIDE 20 MEQ/1
40 TABLET, EXTENDED RELEASE ORAL 2 TIMES DAILY WITH MEALS
Status: DISCONTINUED | OUTPATIENT
Start: 2020-03-26 | End: 2020-03-31

## 2020-03-26 RX ADMIN — HEPARIN SODIUM 5000 UNITS: 5000 INJECTION INTRAVENOUS; SUBCUTANEOUS at 05:34

## 2020-03-26 RX ADMIN — MIDODRINE HYDROCHLORIDE 10 MG: 5 TABLET ORAL at 12:09

## 2020-03-26 RX ADMIN — LAMOTRIGINE 150 MG: 100 TABLET ORAL at 20:20

## 2020-03-26 RX ADMIN — ATORVASTATIN CALCIUM 80 MG: 80 TABLET, FILM COATED ORAL at 20:20

## 2020-03-26 RX ADMIN — MIDODRINE HYDROCHLORIDE 10 MG: 5 TABLET ORAL at 16:49

## 2020-03-26 RX ADMIN — POTASSIUM CHLORIDE 20 MEQ: 1500 TABLET, EXTENDED RELEASE ORAL at 08:33

## 2020-03-26 RX ADMIN — POLYETHYLENE GLYCOL 3350 17 G: 17 POWDER, FOR SOLUTION ORAL at 08:23

## 2020-03-26 RX ADMIN — TIOTROPIUM BROMIDE INHALATION SPRAY 2 PUFF: 3.12 SPRAY, METERED RESPIRATORY (INHALATION) at 07:37

## 2020-03-26 RX ADMIN — BUSPIRONE HYDROCHLORIDE 30 MG: 5 TABLET ORAL at 20:20

## 2020-03-26 RX ADMIN — HEPARIN SODIUM 5000 UNITS: 5000 INJECTION INTRAVENOUS; SUBCUTANEOUS at 14:15

## 2020-03-26 RX ADMIN — INSULIN LISPRO 8 UNITS: 100 INJECTION, SOLUTION INTRAVENOUS; SUBCUTANEOUS at 16:58

## 2020-03-26 RX ADMIN — AMOXICILLIN AND CLAVULANATE POTASSIUM 1 TABLET: 875; 125 TABLET, FILM COATED ORAL at 08:24

## 2020-03-26 RX ADMIN — CLOPIDOGREL BISULFATE 75 MG: 75 TABLET ORAL at 08:25

## 2020-03-26 RX ADMIN — FUROSEMIDE 7.5 MG/HR: 10 INJECTION, SOLUTION INTRAMUSCULAR; INTRAVENOUS at 20:19

## 2020-03-26 RX ADMIN — HEPARIN SODIUM 5000 UNITS: 5000 INJECTION INTRAVENOUS; SUBCUTANEOUS at 20:21

## 2020-03-26 RX ADMIN — PANTOPRAZOLE SODIUM 40 MG: 40 TABLET, DELAYED RELEASE ORAL at 16:49

## 2020-03-26 RX ADMIN — Medication 10 ML: at 08:25

## 2020-03-26 RX ADMIN — BUPRENORPHINE AND NALOXONE 1 FILM: 8; 2 FILM BUCCAL; SUBLINGUAL at 20:21

## 2020-03-26 RX ADMIN — INSULIN LISPRO 2 UNITS: 100 INJECTION, SOLUTION INTRAVENOUS; SUBCUTANEOUS at 16:51

## 2020-03-26 RX ADMIN — ARIPIPRAZOLE 10 MG: 10 TABLET ORAL at 20:21

## 2020-03-26 RX ADMIN — ASPIRIN 81 MG: 81 TABLET, COATED ORAL at 08:25

## 2020-03-26 RX ADMIN — LAMOTRIGINE 150 MG: 100 TABLET ORAL at 08:24

## 2020-03-26 RX ADMIN — POTASSIUM CHLORIDE 40 MEQ: 20 TABLET, EXTENDED RELEASE ORAL at 16:49

## 2020-03-26 RX ADMIN — BUPRENORPHINE AND NALOXONE 1 FILM: 8; 2 FILM BUCCAL; SUBLINGUAL at 08:25

## 2020-03-26 RX ADMIN — BUSPIRONE HYDROCHLORIDE 30 MG: 5 TABLET ORAL at 08:23

## 2020-03-26 RX ADMIN — MICONAZOLE NITRATE: 20 POWDER TOPICAL at 20:21

## 2020-03-26 RX ADMIN — AMOXICILLIN AND CLAVULANATE POTASSIUM 1 TABLET: 875; 125 TABLET, FILM COATED ORAL at 20:20

## 2020-03-26 RX ADMIN — PANTOPRAZOLE SODIUM 40 MG: 40 TABLET, DELAYED RELEASE ORAL at 08:24

## 2020-03-26 RX ADMIN — BENZTROPINE MESYLATE 1 MG: 1 TABLET ORAL at 20:20

## 2020-03-26 RX ADMIN — INSULIN LISPRO 8 UNITS: 100 INJECTION, SOLUTION INTRAVENOUS; SUBCUTANEOUS at 12:09

## 2020-03-26 RX ADMIN — FUROSEMIDE 5 MG/HR: 10 INJECTION, SOLUTION INTRAMUSCULAR; INTRAVENOUS at 00:12

## 2020-03-26 RX ADMIN — INSULIN LISPRO 8 UNITS: 100 INJECTION, SOLUTION INTRAVENOUS; SUBCUTANEOUS at 08:35

## 2020-03-26 RX ADMIN — MIDODRINE HYDROCHLORIDE 10 MG: 5 TABLET ORAL at 08:24

## 2020-03-26 ASSESSMENT — PAIN DESCRIPTION - PROGRESSION
CLINICAL_PROGRESSION: NOT CHANGED

## 2020-03-26 ASSESSMENT — PAIN SCALES - GENERAL
PAINLEVEL_OUTOF10: 0

## 2020-03-26 ASSESSMENT — PAIN SCALES - WONG BAKER
WONGBAKER_NUMERICALRESPONSE: 0

## 2020-03-26 NOTE — PLAN OF CARE
with complaints of shortness of breath with exertion. Pt with pitting lower extremity edema.      Patient and/or Family's stated Goal of Care this Admission: reduce shortness of breath, better understand heart failure and disease management, be more comfortable, and reduce lower extremity edema prior to discharge        :

## 2020-03-26 NOTE — PROGRESS NOTES
End of shift report given to [facility] at bedside. Patient alert and oriented. Bed in lowest position with wheels locked. Call light within reach, no further needs at this time.

## 2020-03-26 NOTE — PROGRESS NOTES
[facility]       Progress Note           Subjective/   62y.o. year old female who we are seeing in consultation for A/CKD. HPI:  Renal function stable with lasix drip, urine output of 2.8 liters over last 24 hours. Still with sob with exertion, weights still up from baseline ~245 pounds. ROS:  Intake adequate. Objective/   GEN:  Chronically ill, /66   Pulse 98   Temp 97.5 °F (36.4 °C) (Axillary)   Resp 20   Ht 5' 4.5\" (1.638 m)   Wt 267 lb 11.2 oz (121.4 kg)   LMP 10/24/2012   SpO2 95%   BMI 45.24 kg/m²   HEENT: non-icteric, no JVD  CV: S1, S2 without m/r/g; ++ LE edema in wraps  RESP: CTA B without w/r/r; breathing wnl  ABD: +bs, soft, distended, nt, no hsm  SKIN: warm, no rashes    Data/  Recent Labs     03/24/20  0433 03/25/20  0446 03/26/20  0530   WBC 7.5 9.4 8.8   HGB 9.0* 9.5* 9.5*   HCT 27.5* 29.2* 29.0*   MCV 86.2 86.4 85.4    287 329     Recent Labs     03/24/20  0433 03/25/20  0446 03/25/20  1452 03/25/20  2316 03/26/20  0530   * 131*  --  133* 137   K 3.6 2.8* 3.0* 3.6 3.7   CL 92* 87*  --  91* 93*   CO2 27 30  --  28 30   GLUCOSE 112* 164*  --  113* 108*   PHOS 3.0 3.4  --   --  2.9   BUN 61* 54*  --  50* 49*   CREATININE 1.7* 1.5*  --  1.4* 1.5*   LABGLOM 31* 36*  --  39* 36*   GFRAA 38* 43*  --  47* 43*       Assessment/     - A/CKD: The patient has chronic kidney disease with a baseline creatinine of 1.5 to 1.7. Her SHAUNNA is likely secondary to a non oliguric ATN in the setting of hypotension and renal hypoperfusion. Continue trial of lasix infusion.    - Edema: Continue with ace wraps as tolerated. Continue lasix infusion at lower dose of 5 mg/hour. Increase fluid restriction to 1.5 liters/day. - Hyponatremia: Stable s/p IV lasix, continue daily free water restriction of 1 liter/day. - Hyperkalemia: Resolved s/p medical therapy. Now requiring prn replacement with diuresis.     - Hypotension: Blood pressure improving with scheduled

## 2020-03-26 NOTE — PLAN OF CARE
Patient's EF (Ejection Fraction) is less than 40%    Heart Failure Medications:   Diuretics[de-identified] Furosemide     (One of the following REQUIRED for EF <40%/SYSTOLIC FAILURE but MAY be used in EF% >40%/DIASTOLIC FAILURE)        ACE[de-identified] None        ARB[de-identified] None         ARNI[de-identified] None    (Beta Blockers)   NON- Evidenced Based Beta Blocker (for EF% >40%/DIASTOLIC FAILURE): None     Evidenced Based Beta Blocker::(REQUIRED for EF% <40%/SYSTOLIC FAILURE) None  . .................................................................................................................................................. Patient's weights and intake/output reviewed: Yes    Patient's Last Weight: 271 lbs obtained by standing scale. Difference of  5lbs less than last documented weight. Intake/Output Summary (Last 24 hours) at 3/25/2020 2041  Last data filed at 3/25/2020 2026  Gross per 24 hour   Intake 860 ml   Output 5600 ml   Net -4740 ml       Comorbidities Reviewed Yes    Patient has a past medical history of Anxiety and depression, Arthritis, CAD (coronary artery disease), Cardiomyopathy ([address] CHF (congestive heart failure) ([address] Chronic systolic heart failure ([address] COPD (chronic obstructive pulmonary disease) ([address] Diabetes mellitus ([address] GERD (gastroesophageal reflux disease), Hyperlipidemia, Hypertension, Hypotension, MI (myocardial infarction) ([address] Peripheral neuropathy, Peripheral vascular disease ([address] Pulmonary HTN ([address] Reflux, Sleep apnea, and Wears glasses. >>For CHF and Comorbidity documentation on Education Time and Topics, please see Education Tab    Progressive Mobility Assessment:  What is this patient's Current Level of Mobility?: Ambulatory-Up Ad Jasmin  How was this patient Mobilized today?: Edge of Bed, Up to Chair,  Up to Toilet/Shower and Up in Room                 With Whom? Self                 Level of Difficulty/Assistance: Independent     Pt resting in bed at this time on room air.  Pt denies

## 2020-03-26 NOTE — PROGRESS NOTES
1mg/ml infusion 5 mg/hr (03/26/20 0012)    dextrose         Lab Data:  CBC:   Recent Labs     03/24/20  0433 03/25/20  0446 03/26/20  0530   WBC 7.5 9.4 8.8   HGB 9.0* 9.5* 9.5*    287 329     BMP:    Recent Labs     03/25/20  0446 03/25/20  1452 03/25/20  2316 03/26/20  0530   *  --  133* 137   K 2.8* 3.0* 3.6 3.7   CO2 30  --  28 30   BUN 54*  --  50* 49*   CREATININE 1.5*  --  1.4* 1.5*     INR:  No results for input(s): INR in the last 72 hours. BNP:    Recent Labs     03/25/20  0944   PROBNP 3,596*     Lab Results   Component Value Date    LVEF 38 01/24/2020     CABG 1/27/2020:  Urgent CABG X 1, with pedicled LIMA to LAD, SGC, CPB with On pump-beating heart, URI, Epiaortic ultrasound, Doppler verification of grafts, Bilateral 5 level intercostal nerve block(Exparel), Platelet gel application. Sternal plating.  Vas Cath placement.     URI 1/24/2020:  Ejection fraction is visually estimated at 35-40%.   Moderate to severe mitral regurgitation with multiple jets visualized.   ERO estimated at 0.29 cm2.   Mild tricuspid regurgitation.   The left atrial appendage shows evidence of thrombus formation and low flow   velocities.  Moderate amount of plaque in the aorta.     Echo 1/22/2020:   Technically difficult examination secondary to habitus.   The left ventricular systolic function is severely reduced with an ejection   fraction of 25 %.   There is severe hypokinesis of the anterior and apical walls consistent with   infarction within the territory of the left anterior descending artery.   Grade II diastolic dysfunction with elevated left ventricular filling   pressure.   Moderate to severe mitral regurgitation.   Mild tricuspid regurgitation.   is estimated at 53 mmHg consistent with moderate pulmonary hypertension   (Right atrial pressure of 3 mmHg).   If clinically necessary, consider URI for better evaluation of mitral   regurgitation     Coronary angiogram 1/20/2020:  Dominance :

## 2020-03-27 PROBLEM — N17.9 ACUTE KIDNEY INJURY SUPERIMPOSED ON CHRONIC KIDNEY DISEASE (HCC): Status: ACTIVE | Noted: 2019-04-09

## 2020-03-27 PROBLEM — N18.9 ACUTE KIDNEY INJURY SUPERIMPOSED ON CHRONIC KIDNEY DISEASE (HCC): Status: ACTIVE | Noted: 2019-04-09

## 2020-03-27 LAB
ALBUMIN SERPL-MCNC: 3.4 G/DL (ref 3.4–5)
ANION GAP SERPL CALCULATED.3IONS-SCNC: 16 MMOL/L (ref 3–16)
BUN BLDV-MCNC: 47 MG/DL (ref 7–20)
CALCIUM SERPL-MCNC: 9.4 MG/DL (ref 8.3–10.6)
CHLORIDE BLD-SCNC: 89 MMOL/L (ref 99–110)
CO2: 28 MMOL/L (ref 21–32)
CREAT SERPL-MCNC: 1.4 MG/DL (ref 0.6–1.1)
GFR AFRICAN AMERICAN: 47
GFR NON-AFRICAN AMERICAN: 39
GLUCOSE BLD-MCNC: 123 MG/DL (ref 70–99)
GLUCOSE BLD-MCNC: 134 MG/DL (ref 70–99)
GLUCOSE BLD-MCNC: 141 MG/DL (ref 70–99)
GLUCOSE BLD-MCNC: 141 MG/DL (ref 70–99)
GLUCOSE BLD-MCNC: 149 MG/DL (ref 70–99)
HCT VFR BLD CALC: 28.2 % (ref 36–48)
HEMOGLOBIN: 9 G/DL (ref 12–16)
MAGNESIUM: 2 MG/DL (ref 1.8–2.4)
MCH RBC QN AUTO: 27.7 PG (ref 26–34)
MCHC RBC AUTO-ENTMCNC: 32.1 G/DL (ref 31–36)
MCV RBC AUTO: 86.2 FL (ref 80–100)
PDW BLD-RTO: 19.1 % (ref 12.4–15.4)
PERFORMED ON: ABNORMAL
PHOSPHORUS: 3.1 MG/DL (ref 2.5–4.9)
PLATELET # BLD: 307 K/UL (ref 135–450)
PMV BLD AUTO: 8.3 FL (ref 5–10.5)
POTASSIUM SERPL-SCNC: 2.9 MMOL/L (ref 3.5–5.1)
POTASSIUM SERPL-SCNC: 2.9 MMOL/L (ref 3.5–5.1)
RBC # BLD: 3.27 M/UL (ref 4–5.2)
SODIUM BLD-SCNC: 133 MMOL/L (ref 136–145)
WBC # BLD: 9 K/UL (ref 4–11)

## 2020-03-27 PROCEDURE — 6370000000 HC RX 637 (ALT 250 FOR IP): Performed by: NURSE PRACTITIONER

## 2020-03-27 PROCEDURE — 6370000000 HC RX 637 (ALT 250 FOR IP): Performed by: INTERNAL MEDICINE

## 2020-03-27 PROCEDURE — 94640 AIRWAY INHALATION TREATMENT: CPT

## 2020-03-27 PROCEDURE — 36415 COLL VENOUS BLD VENIPUNCTURE: CPT

## 2020-03-27 PROCEDURE — 6360000002 HC RX W HCPCS: Performed by: INTERNAL MEDICINE

## 2020-03-27 PROCEDURE — 99233 SBSQ HOSP IP/OBS HIGH 50: CPT | Performed by: NURSE PRACTITIONER

## 2020-03-27 PROCEDURE — 80069 RENAL FUNCTION PANEL: CPT

## 2020-03-27 PROCEDURE — 2580000003 HC RX 258: Performed by: INTERNAL MEDICINE

## 2020-03-27 PROCEDURE — 85027 COMPLETE CBC AUTOMATED: CPT

## 2020-03-27 PROCEDURE — 84132 ASSAY OF SERUM POTASSIUM: CPT

## 2020-03-27 PROCEDURE — 83735 ASSAY OF MAGNESIUM: CPT

## 2020-03-27 PROCEDURE — 2060000000 HC ICU INTERMEDIATE R&B

## 2020-03-27 RX ORDER — POTASSIUM CHLORIDE 20 MEQ/1
40 TABLET, EXTENDED RELEASE ORAL ONCE
Status: COMPLETED | OUTPATIENT
Start: 2020-03-27 | End: 2020-03-27

## 2020-03-27 RX ORDER — POTASSIUM CHLORIDE 20 MEQ/1
40 TABLET, EXTENDED RELEASE ORAL 2 TIMES DAILY
Status: DISCONTINUED | OUTPATIENT
Start: 2020-03-27 | End: 2020-03-27

## 2020-03-27 RX ORDER — POTASSIUM CHLORIDE 7.45 MG/ML
10 INJECTION INTRAVENOUS
Status: DISCONTINUED | OUTPATIENT
Start: 2020-03-27 | End: 2020-03-27

## 2020-03-27 RX ADMIN — ASPIRIN 81 MG: 81 TABLET, COATED ORAL at 09:14

## 2020-03-27 RX ADMIN — LAMOTRIGINE 150 MG: 100 TABLET ORAL at 22:02

## 2020-03-27 RX ADMIN — MIDODRINE HYDROCHLORIDE 10 MG: 5 TABLET ORAL at 09:15

## 2020-03-27 RX ADMIN — INSULIN LISPRO 8 UNITS: 100 INJECTION, SOLUTION INTRAVENOUS; SUBCUTANEOUS at 18:41

## 2020-03-27 RX ADMIN — POTASSIUM CHLORIDE 40 MEQ: 1500 TABLET, EXTENDED RELEASE ORAL at 10:32

## 2020-03-27 RX ADMIN — AMOXICILLIN AND CLAVULANATE POTASSIUM 1 TABLET: 875; 125 TABLET, FILM COATED ORAL at 09:13

## 2020-03-27 RX ADMIN — CLOPIDOGREL BISULFATE 75 MG: 75 TABLET ORAL at 09:14

## 2020-03-27 RX ADMIN — POLYETHYLENE GLYCOL 3350 17 G: 17 POWDER, FOR SOLUTION ORAL at 09:15

## 2020-03-27 RX ADMIN — BUSPIRONE HYDROCHLORIDE 30 MG: 5 TABLET ORAL at 22:02

## 2020-03-27 RX ADMIN — BUSPIRONE HYDROCHLORIDE 30 MG: 5 TABLET ORAL at 09:14

## 2020-03-27 RX ADMIN — MICONAZOLE NITRATE: 20 POWDER TOPICAL at 09:21

## 2020-03-27 RX ADMIN — MICONAZOLE NITRATE: 20 POWDER TOPICAL at 22:08

## 2020-03-27 RX ADMIN — POTASSIUM CHLORIDE 40 MEQ: 20 TABLET, EXTENDED RELEASE ORAL at 15:26

## 2020-03-27 RX ADMIN — LAMOTRIGINE 150 MG: 100 TABLET ORAL at 09:14

## 2020-03-27 RX ADMIN — MIDODRINE HYDROCHLORIDE 10 MG: 5 TABLET ORAL at 20:26

## 2020-03-27 RX ADMIN — BENZTROPINE MESYLATE 1 MG: 1 TABLET ORAL at 22:28

## 2020-03-27 RX ADMIN — OXYCODONE HYDROCHLORIDE AND ACETAMINOPHEN 1 TABLET: 5; 325 TABLET ORAL at 10:32

## 2020-03-27 RX ADMIN — OXYCODONE HYDROCHLORIDE AND ACETAMINOPHEN 1 TABLET: 5; 325 TABLET ORAL at 14:47

## 2020-03-27 RX ADMIN — INSULIN LISPRO 2 UNITS: 100 INJECTION, SOLUTION INTRAVENOUS; SUBCUTANEOUS at 18:41

## 2020-03-27 RX ADMIN — HEPARIN SODIUM 5000 UNITS: 5000 INJECTION INTRAVENOUS; SUBCUTANEOUS at 14:47

## 2020-03-27 RX ADMIN — Medication 10 ML: at 09:15

## 2020-03-27 RX ADMIN — INSULIN LISPRO 8 UNITS: 100 INJECTION, SOLUTION INTRAVENOUS; SUBCUTANEOUS at 09:20

## 2020-03-27 RX ADMIN — HEPARIN SODIUM 5000 UNITS: 5000 INJECTION INTRAVENOUS; SUBCUTANEOUS at 22:03

## 2020-03-27 RX ADMIN — AMOXICILLIN AND CLAVULANATE POTASSIUM 1 TABLET: 875; 125 TABLET, FILM COATED ORAL at 22:03

## 2020-03-27 RX ADMIN — POTASSIUM CHLORIDE 40 MEQ: 20 TABLET, EXTENDED RELEASE ORAL at 09:13

## 2020-03-27 RX ADMIN — ARIPIPRAZOLE 10 MG: 10 TABLET ORAL at 22:03

## 2020-03-27 RX ADMIN — ATORVASTATIN CALCIUM 80 MG: 80 TABLET, FILM COATED ORAL at 22:03

## 2020-03-27 RX ADMIN — TIOTROPIUM BROMIDE INHALATION SPRAY 2 PUFF: 3.12 SPRAY, METERED RESPIRATORY (INHALATION) at 07:44

## 2020-03-27 RX ADMIN — HEPARIN SODIUM 5000 UNITS: 5000 INJECTION INTRAVENOUS; SUBCUTANEOUS at 06:07

## 2020-03-27 RX ADMIN — ACETAMINOPHEN 650 MG: 325 TABLET ORAL at 03:38

## 2020-03-27 RX ADMIN — PANTOPRAZOLE SODIUM 40 MG: 40 TABLET, DELAYED RELEASE ORAL at 09:14

## 2020-03-27 RX ADMIN — OXYCODONE HYDROCHLORIDE AND ACETAMINOPHEN 1 TABLET: 5; 325 TABLET ORAL at 22:28

## 2020-03-27 RX ADMIN — Medication 10 ML: at 22:03

## 2020-03-27 RX ADMIN — POTASSIUM CHLORIDE 40 MEQ: 20 TABLET, EXTENDED RELEASE ORAL at 18:40

## 2020-03-27 RX ADMIN — PANTOPRAZOLE SODIUM 40 MG: 40 TABLET, DELAYED RELEASE ORAL at 18:40

## 2020-03-27 RX ADMIN — MIDODRINE HYDROCHLORIDE 10 MG: 5 TABLET ORAL at 12:41

## 2020-03-27 RX ADMIN — INSULIN LISPRO 2 UNITS: 100 INJECTION, SOLUTION INTRAVENOUS; SUBCUTANEOUS at 09:20

## 2020-03-27 ASSESSMENT — PAIN SCALES - GENERAL
PAINLEVEL_OUTOF10: 7
PAINLEVEL_OUTOF10: 0
PAINLEVEL_OUTOF10: 6
PAINLEVEL_OUTOF10: 5
PAINLEVEL_OUTOF10: 6
PAINLEVEL_OUTOF10: 7

## 2020-03-27 ASSESSMENT — PAIN DESCRIPTION - LOCATION: LOCATION: SHOULDER

## 2020-03-27 ASSESSMENT — PAIN DESCRIPTION - PAIN TYPE: TYPE: ACUTE PAIN

## 2020-03-27 ASSESSMENT — PAIN DESCRIPTION - ORIENTATION: ORIENTATION: LEFT

## 2020-03-27 NOTE — PROGRESS NOTES
03/27/20  1321   *  --  133* 137 133*  --    K 2.8*   < > 3.6 3.7 2.9* 2.9*   CL 87*  --  91* 93* 89*  --    CO2 30  --  28 30 28  --    BUN 54*  --  50* 49* 47*  --    CREATININE 1.5*  --  1.4* 1.5* 1.4*  --    CALCIUM 9.1  --  9.4 9.7 9.4  --    PHOS 3.4  --   --  2.9 3.1  --     < > = values in this interval not displayed. Urinalysis:      Lab Results   Component Value Date    NITRU Negative 03/10/2020    WBCUA 3-5 09/28/2019    BACTERIA Rare 09/28/2019    RBCUA None seen 09/28/2019    BLOODU Negative 03/10/2020    SPECGRAV 1.010 03/10/2020    GLUCOSEU Negative 03/10/2020       Radiology:  CT CHEST WO CONTRAST   Final Result   Post median sternotomy changes, minor induration and small amount of gas or   air along the sternotomy wound. No fluid collection to indicate abscess. Cardiomegaly with small left pleural effusion and left basilar atelectasis or   developing infection. No pulmonary edema.              Assessment/Plan:    Active Hospital Problems    Diagnosis    Hypotension [I95.9]    Pulmonary hypertension ([address] [I27.20]    CHF (congestive heart failure), NYHA class I, acute on chronic, combined (HCC) [I50.43]    Acute kidney injury superimposed on chronic kidney disease (HCC) [N17.9, N18.9]       Acute on chronic systolic heart failure  - marked weight gain, volume overload noted, elevated BNP on admission  - last echo 1/20 with EF 25%, severe anterior hypokinesis, grade II diastolic dysfunction  - cardiology consulted/following, pt initially started on IV push lasix however unable to tolerate due to SHAUNNA and hypotension, placed on 2 day lasix holiday then started on lasix drip on 3/24  - unable to tolerate BB, ACEi/ARB due to hypotension  - daily weights, close I's and O's, daily BMP     SHAUNNA with baseline CKD stage III  - likely ATN from persistent hypotension and renal hypoperfusion  - nephrology consulted/following  - goal BP >100  - continue to monitor closely  - SHAUNNA improved after holding lasix for 2 days     Hypotension  - likely 2/2 heart failure  - no improvement with IVF in ED  - improved with midodrine     DMII  - well controlled, A1C 5.8  - continue SSI ACHS with prandial insulin     CAD s/p CABG in Jan 2020  - no active chest pain  - continue home ASA, plavix, statin  - continue augmentin for recent sternal wound infection     COPD  - no evidence of exacerbation  - continue home inhalers     HLD  - continue home statin     Opioid dependence  - continue home suboxone     Morbid obesity  - with body mass index is 02.89 kg/m², complicating assessment and treatment. Placing patient at risk for multiple co-morbidities as well as early death and contributing to the patient's presentation, counseled on weight loss. DVT Prophylaxis: SQ heparin  Diet: DIET CARB CONTROL; Low Sodium (2 GM);  Daily Fluid Restriction: 1500 ml  Code Status: Full Code    PT/OT Eval Status: not indicated     Dispo - ~2-3 days     Kelli Coffman Cons, APRN - CNP

## 2020-03-27 NOTE — PROGRESS NOTES
Just an FYI, patient potassium was 2.9 after 80 mg potassium given this morning. Dr. Milka Quispe did restart Lasix gtt at 5mg, but placed parameter orders to not give unless potassium >3.5. Was told to not start Lasix gtt today. He added one time dose of 40mEq potassium to be given now and then instructed to give 1700 dose.   Sent to Jed Oneill NP

## 2020-03-27 NOTE — PROGRESS NOTES
[facility]       Progress Note           Subjective/   62y.o. year old female who we are seeing in consultation for A/CKD. HPI:  Renal function stable with lasix drip, urine output of 3.8 liters over last 24 hours. Still with sob with exertion, weights still up from baseline ~245 pounds. ROS:  Intake adequate. Objective/   GEN:  Chronically ill, BP 91/63   Pulse 93   Temp 98.8 °F (37.1 °C)   Resp 20   Ht 5' 4.5\" (1.638 m)   Wt 267 lb (121.1 kg)   LMP 10/24/2012   SpO2 94%   BMI 45.12 kg/m²   HEENT: non-icteric, no JVD  CV: S1, S2 without m/r/g; ++ LE edema in wraps  RESP: CTA B without w/r/r; breathing wnl  ABD: +bs, soft, distended, nt, no hsm  SKIN: warm, no rashes    Data/  Recent Labs     03/25/20  0446 03/26/20  0530 03/27/20  0436   WBC 9.4 8.8 9.0   HGB 9.5* 9.5* 9.0*   HCT 29.2* 29.0* 28.2*   MCV 86.4 85.4 86.2    329 307     Recent Labs     03/25/20  0446  03/25/20  2316 03/26/20  0530 03/27/20  0436   *  --  133* 137 133*   K 2.8*   < > 3.6 3.7 2.9*   CL 87*  --  91* 93* 89*   CO2 30  --  28 30 28   GLUCOSE 164*  --  113* 108* 123*   PHOS 3.4  --   --  2.9 3.1   MG  --   --   --   --  2.00   BUN 54*  --  50* 49* 47*   CREATININE 1.5*  --  1.4* 1.5* 1.4*   LABGLOM 36*  --  39* 36* 39*   GFRAA 43*  --  47* 43* 47*    < > = values in this interval not displayed. Assessment/     - A/CKD: The patient has chronic kidney disease with a baseline creatinine of 1.5 to 1.7. Her SHAUNNA is likely secondary to a non oliguric ATN in the setting of hypotension and renal hypoperfusion. Continue trial of lasix infusion, held this AM, resume at 5 mg/hour if K above 3.5.    - Edema: Continue with ace wraps as tolerated. Continue lasix infusion at lower dose of 5 mg/hour. Increased fluid restriction to 1.5 liters/day. - Hyponatremia: Stable s/p IV lasix, continue daily free water restriction of 1.5 liters/day. - Hyperkalemia: Resolved s/p medical therapy.  Now requiring prn replacement with diuresis. Started on KCL 40 meq po bid. - Hypotension: Blood pressure improving with scheduled Midodrine and IV Albumin. - Anemia: Stable hemoglobin, monitor.

## 2020-03-27 NOTE — PROGRESS NOTES
evaluation inpatient to discuss surgical revascularization option. Hold Clopidogrel. BLE ZACH's 4/18/19:   1. There is severe arterial insufficiency at rest involving the right lower    extremity. There occlusive disease of the right proximal superficial femoral    (noted stent) and mid posterior tibial arteries. There is a 50-74% stenosis    at 4the right deep femoral artery with evidence of inflow disease.    2. There is severe arterial insufficiency at rest involving the left lower    extremity. There is occlusive disease of the left proximal superficial    femoral artery (noted stent). There is a 30-49% stenosis at the left deep    femoral artery with evidence of inflow disease.         ECHO 4/3/19:   Pedro Luis Brennan systolic function is mildly reduced with EF estimated at 40-45%.   There is severe HK of the apex and apical-septal segment.   Normal left ventricular diastolic filling pressure.   Mild mitral regurgitation.   Systolic pulmonary artery pressure (SPAP) estimated at 32mmHg (RA pressure 3mmHg).

## 2020-03-27 NOTE — PROGRESS NOTES
Patient's potassium was 2.9 this morning, down from 3.6 yesterday. Will give scheduled potassium for this morning. Did you want anything additional or a recheck later on? Thanks!  Sent to [employer]

## 2020-03-27 NOTE — CONSULTS
[address] [id number] Continence Nurse  Consult Note       NAME:  Angela Bautista  MEDICAL RECORD NUMBER:  [id number]  AGE: 62 y.o. GENDER: female  : 1965  TODAY'S DATE:  3/27/2020    Subjective: I was suppose to see my podiatrist on Monday but I was here. Reason for WOCN Evaluation and Assessment: unstageable diabetic ulcer on toe      Marley Root is a 62 y.o. female referred by:   [] Physician  [x] Nursing  [] Other:     Wound Identification:  Wound Type: diabetic  Contributing Factors: edema, chronic pressure, decreased mobility, obesity and CHF    Wound History: 62 y.o. female who presented to [facility] with hypotension and weight gain. Patient was just discharged last week for SHAUNNA and CHF exacerbation. Since her discharge, she has had persistent weight gain and increasing LE edema. She has also had persistent BP in the 57I-98F systolic. This is common for her and she has never had symptoms associated with BP in the this range before. She has been in contact with her cardiology team since her discharge and increased her home diuretic but it had no effect on her weight gain. Over the past couple days, she has had increased HIGHTOWER as well which is typical for her when she gets volume overloaded. She denies fevers, chills, chest pain, nausea, vomiting or diarrhea.   Current Wound Care Treatment:  R Plantar Foot, L Foot Toes 1,2,3 - betadine swabs    Patient Goal of Care:  [x] Wound Healing  [] Odor Control  [] Palliative Care  [] Pain Control   [] Other:         PAST MEDICAL HISTORY        Diagnosis Date    Anxiety and depression     Arthritis     CAD (coronary artery disease) 2018    [facility]     [facility]     Chronic systolic heart failure ([facility] 2020    COPD ([facility] obstructive [facility] disease) [facility]     Diabetes mellitus (HCC)     GERD (gastroesophageal reflux disease)     Hyperlipidemia     Hypertension     Hypotension     [facility] infarction) [facility]     Peripheral neuropathy     Peripheral vascular disease (HCC)     Pulmonary HTN (HCC)     Reflux     Sleep apnea     has never done sleep study;does not use CPAP    Wears glasses     for reading       PAST SURGICAL HISTORY    Past Surgical History:   Procedure Laterality Date    ARTERIAL BYPASS SURGRY Left 01/06/2016    Axillary/Subclavian to Brachial Bypass w/reversed SVG    CARDIAC CATHETERIZATION  03/27/2018    Dr. Vianney Chinchilla - at [address] [place]  01/20/2020    Dr. Beckie Garza      pancreatitis post surgery    CORONARY ANGIOPLASTY WITH STENT PLACEMENT  03/16/2018    MELODY- 3.5 x 38 and 3.5 x 20 to Cx    CORONARY ANGIOPLASTY WITH STENT PLACEMENT  01/03/2018    MELODY- 3.0 x 38 and 2.75 x 26 and 2.75 x 8 and 2.75 x 22 to the LAD    CORONARY ARTERY BYPASS GRAFT N/A 1/27/2020    CORONARY ARTERY BYPASS GRAFTING X 1, ON PUMP BEATING HEART, INTERNAL MAMMARY ARTERY TO LEFT ANTERIOR DESCENDING ARTERY, VAS CATH INSERTION, URI, PLATELET GEL APPLICATION, 5 LEVEL BILATERAL INTERCOSTAL NERVE BLOCK, STERNAL PLATING performed by Chon Smyth MD at [address] Right 5/16/2019    fem-pop, performed by Ho Vee MD at [address]  02/14/2019    CardioMEMs insertion for CHF    ROTATOR CUFF REPAIR Left 04/22/2016    TONSILLECTOMY      TRANSLUMINAL ANGIOPLASTY Bilateral 10/08/2019    BLE w/PTA occluded L SFA and restenting L Prox SFA    TUMOR EXCISION      benign tumors X 2 chest & axilla    UPPER GASTROINTESTINAL ENDOSCOPY  4/25/2013    BX barretts, HH, gastritis    UPPER GASTROINTESTINAL ENDOSCOPY  12/10/2015    UPPER GASTROINTESTINAL ENDOSCOPY  01/06/2017    Gastritis       FAMILY HISTORY    Family History   Problem Relation Age of Onset    Heart Disease Maternal Grandmother     Cancer Mother         lung and brain cancer    Cancer Maternal Aunt         lung and brain Indicates understanding       [] Needs reinforcement  [] Unsuccessful      [x] Verbal Understanding  [] Demonstrated understanding       [] No evidence of learning  [] Refused teaching         [] N/A       Electronically signed by Rajesh Zaldivar RN, CWOCN on 3/27/2020 at 3:22 PM

## 2020-03-27 NOTE — PROGRESS NOTES
lower extremity edema.      Patient and/or Family's stated Goal of Care this Admission: be more comfortable prior to discharge        :

## 2020-03-27 NOTE — ADT AUTH CERT
Utilization Reviews         Heart Failure - Care Day 8 (3/27/2020) by Mariama Bautista RN         Review Status Review Entered   Completed 3/27/2020 12:41       Criteria Review      Care Day: 8 Care Date: 3/27/2020 Level of Care:    Guideline Day 3    Level Of Care    (X) Floor to discharge    Clinical Status    (X) * Hemodynamic stability    3/27/2020 12:41 PM EDT by Christos Toledo      HR 82-98  bp: 93/63    ( ) * Tachypnea absent    3/27/2020 12:41 PM EDT by Christos Toledo      18-20    (X) * Oxygenation at baseline or acceptable for next level of care    3/27/2020 12:41 PM EDT by Christos Toledo      94% ra    (X) * Dyspnea at baseline or acceptable for next level of care    (X) * Cardiac rate and rhythm acceptable    ( ) * Pulmonary edema absent or acceptable for next level of care    ( ) * Peripheral or sacral edema absent, at baseline, or improved    (X) * Mental status at baseline    ( ) * Volume status acceptable on oral medication    ( ) * Renal function at baseline or acceptable for next level of care    3/27/2020 12:41 PM EDT by Christos Toledo      bun: 47  cr: 1.4    ( ) * Electrolyte levels normal or acceptable for next level of care    ( ) * Immediate precipitating factors absent or controlled    ( ) * Discharge plans and education understood    Activity    ( ) * Ambulatory    Routes    ( ) * Oral hydration, medications, and diet    Interventions    (X) * Oxygen absent    Medications    (X) Diuretics    * Milestone   Additional Notes   3/27  Day 8       Pt remains on intermediate unit      Vitals: t: 37.1 p: 93 rr: 20 bp: 93/63 spo2: 94% ra      Abn labs:   gluc: 134, na: 133, k+: 2.9, bun: 47, cr: 1.4, hgb: 9.0      Orders: Accu checks ac/hs with ssi coverage   Heparin 5000 units sq tid   Bmp, cbc, renal function panel daily   Cardiology consult      Per Cardiology PN:  ASSESSMENT:    1. Hypotension - improved on midodrine 10 mg   2.  CHF, acute on chronic systolic - diuresed 3659 in past 24 hours, weight down < 1 lb. K 2.9, Lasix infusion at 7.5 ml/hr   3. Cardiomyopathy, ischemic - EF 25%, no ACEi/ ARB/ ARNI or BB due to hypotension and SHAUNNA/ CKD   4. CAD - s/p prior PCI's then CABG Jan 2020   5. Acute kidney injury on chronic kidney disease - stable with diuresis   6. Hypokalemia - K 2.9 this am, being replaced   7. Mitral regurg   8. Sternal wound infection - augmentin per CT surgery   9. CLINT   10. DM           PLAN:   1. Replete K   2. Favor continuing Lasix infusion but consider decreasing dose   3. Continue midodrine 10 mg tid   4.  Magnesium checked and stable       Heart Failure - Care Day 7 (3/26/2020) by Tatum Camp RN         Review Status Review Entered   Completed 3/26/2020 14:32       Criteria Review      Care Day: 7 Care Date: 3/26/2020 Level of Care:    Guideline Day 3    Level Of Care    (X) Floor to discharge    3/26/2020 2:32 PM EDT by Indy Kolb      Progressive    Clinical Status    (X) * Hemodynamic stability    3/26/2020 2:32 PM EDT by Indy Kolb      hr   bp: 100/67    ( ) * Tachypnea absent    3/26/2020 2:32 PM EDT by Indy Kolb      rr 18-20    (X) * Oxygenation at baseline or acceptable for next level of care    3/26/2020 2:32 PM EDT by Indy Kolb      93-95% ra    (X) * Dyspnea at baseline or acceptable for next level of care    (X) * Cardiac rate and rhythm acceptable    ( ) * Pulmonary edema absent or acceptable for next level of care    ( ) * Peripheral or sacral edema absent, at baseline, or improved    (X) * Mental status at baseline    ( ) * Volume status acceptable on oral medication    ( ) * Renal function at baseline or acceptable for next level of care    ( ) * Electrolyte levels normal or acceptable for next level of care    ( ) * Immediate precipitating factors absent or controlled    ( ) * Discharge plans and education understood    Activity    ( ) * Ambulatory    Routes    ( ) * Oral hydration, medications, and

## 2020-03-27 NOTE — PROGRESS NOTES
Call placed to nephrology concerning IV potassium order. Patient states that she is unable to tolerate IV potassium due to extreme burning. Awaiting call back.

## 2020-03-28 LAB
ALBUMIN SERPL-MCNC: 3.5 G/DL (ref 3.4–5)
ANION GAP SERPL CALCULATED.3IONS-SCNC: 15 MMOL/L (ref 3–16)
BUN BLDV-MCNC: 43 MG/DL (ref 7–20)
CALCIUM SERPL-MCNC: 9.5 MG/DL (ref 8.3–10.6)
CHLORIDE BLD-SCNC: 93 MMOL/L (ref 99–110)
CO2: 27 MMOL/L (ref 21–32)
CREAT SERPL-MCNC: 1.4 MG/DL (ref 0.6–1.1)
GFR AFRICAN AMERICAN: 47
GFR NON-AFRICAN AMERICAN: 39
GLUCOSE BLD-MCNC: 116 MG/DL (ref 70–99)
GLUCOSE BLD-MCNC: 123 MG/DL (ref 70–99)
GLUCOSE BLD-MCNC: 136 MG/DL (ref 70–99)
GLUCOSE BLD-MCNC: 158 MG/DL (ref 70–99)
GLUCOSE BLD-MCNC: 96 MG/DL (ref 70–99)
HCT VFR BLD CALC: 27.8 % (ref 36–48)
HEMOGLOBIN: 9.1 G/DL (ref 12–16)
MCH RBC QN AUTO: 28.1 PG (ref 26–34)
MCHC RBC AUTO-ENTMCNC: 32.6 G/DL (ref 31–36)
MCV RBC AUTO: 86.1 FL (ref 80–100)
PDW BLD-RTO: 19.1 % (ref 12.4–15.4)
PERFORMED ON: ABNORMAL
PERFORMED ON: NORMAL
PHOSPHORUS: 3.1 MG/DL (ref 2.5–4.9)
PLATELET # BLD: 320 K/UL (ref 135–450)
PMV BLD AUTO: 8.4 FL (ref 5–10.5)
POTASSIUM SERPL-SCNC: 3.9 MMOL/L (ref 3.5–5.1)
PRO-BNP: 3052 PG/ML (ref 0–124)
RBC # BLD: 3.22 M/UL (ref 4–5.2)
SODIUM BLD-SCNC: 135 MMOL/L (ref 136–145)
WBC # BLD: 7.9 K/UL (ref 4–11)

## 2020-03-28 PROCEDURE — 6370000000 HC RX 637 (ALT 250 FOR IP): Performed by: NURSE PRACTITIONER

## 2020-03-28 PROCEDURE — 2060000000 HC ICU INTERMEDIATE R&B

## 2020-03-28 PROCEDURE — 6360000002 HC RX W HCPCS: Performed by: INTERNAL MEDICINE

## 2020-03-28 PROCEDURE — 83880 ASSAY OF NATRIURETIC PEPTIDE: CPT

## 2020-03-28 PROCEDURE — 2580000003 HC RX 258: Performed by: INTERNAL MEDICINE

## 2020-03-28 PROCEDURE — 85027 COMPLETE CBC AUTOMATED: CPT

## 2020-03-28 PROCEDURE — 80069 RENAL FUNCTION PANEL: CPT

## 2020-03-28 PROCEDURE — 94640 AIRWAY INHALATION TREATMENT: CPT

## 2020-03-28 PROCEDURE — 6370000000 HC RX 637 (ALT 250 FOR IP): Performed by: INTERNAL MEDICINE

## 2020-03-28 PROCEDURE — 99232 SBSQ HOSP IP/OBS MODERATE 35: CPT | Performed by: NURSE PRACTITIONER

## 2020-03-28 PROCEDURE — 36415 COLL VENOUS BLD VENIPUNCTURE: CPT

## 2020-03-28 RX ADMIN — BUSPIRONE HYDROCHLORIDE 30 MG: 5 TABLET ORAL at 20:50

## 2020-03-28 RX ADMIN — FUROSEMIDE 5 MG/HR: 10 INJECTION, SOLUTION INTRAMUSCULAR; INTRAVENOUS at 20:51

## 2020-03-28 RX ADMIN — PANTOPRAZOLE SODIUM 40 MG: 40 TABLET, DELAYED RELEASE ORAL at 16:34

## 2020-03-28 RX ADMIN — LAMOTRIGINE 150 MG: 100 TABLET ORAL at 20:50

## 2020-03-28 RX ADMIN — FUROSEMIDE 5 MG/HR: 10 INJECTION, SOLUTION INTRAMUSCULAR; INTRAVENOUS at 09:29

## 2020-03-28 RX ADMIN — MIDODRINE HYDROCHLORIDE 10 MG: 5 TABLET ORAL at 16:34

## 2020-03-28 RX ADMIN — POTASSIUM CHLORIDE 40 MEQ: 20 TABLET, EXTENDED RELEASE ORAL at 08:29

## 2020-03-28 RX ADMIN — BUSPIRONE HYDROCHLORIDE 30 MG: 5 TABLET ORAL at 08:34

## 2020-03-28 RX ADMIN — CYCLOBENZAPRINE 10 MG: 10 TABLET, FILM COATED ORAL at 17:19

## 2020-03-28 RX ADMIN — CYCLOBENZAPRINE 10 MG: 10 TABLET, FILM COATED ORAL at 04:06

## 2020-03-28 RX ADMIN — OXYCODONE HYDROCHLORIDE AND ACETAMINOPHEN 1 TABLET: 5; 325 TABLET ORAL at 20:49

## 2020-03-28 RX ADMIN — LAMOTRIGINE 150 MG: 100 TABLET ORAL at 08:30

## 2020-03-28 RX ADMIN — MIDODRINE HYDROCHLORIDE 10 MG: 5 TABLET ORAL at 08:30

## 2020-03-28 RX ADMIN — INSULIN LISPRO 8 UNITS: 100 INJECTION, SOLUTION INTRAVENOUS; SUBCUTANEOUS at 16:30

## 2020-03-28 RX ADMIN — TIOTROPIUM BROMIDE INHALATION SPRAY 2 PUFF: 3.12 SPRAY, METERED RESPIRATORY (INHALATION) at 07:51

## 2020-03-28 RX ADMIN — PANTOPRAZOLE SODIUM 40 MG: 40 TABLET, DELAYED RELEASE ORAL at 08:30

## 2020-03-28 RX ADMIN — HEPARIN SODIUM 5000 UNITS: 5000 INJECTION INTRAVENOUS; SUBCUTANEOUS at 20:49

## 2020-03-28 RX ADMIN — MICONAZOLE NITRATE: 20 POWDER TOPICAL at 08:41

## 2020-03-28 RX ADMIN — MIDODRINE HYDROCHLORIDE 10 MG: 5 TABLET ORAL at 13:01

## 2020-03-28 RX ADMIN — ATORVASTATIN CALCIUM 80 MG: 80 TABLET, FILM COATED ORAL at 20:50

## 2020-03-28 RX ADMIN — CLOPIDOGREL BISULFATE 75 MG: 75 TABLET ORAL at 08:30

## 2020-03-28 RX ADMIN — INSULIN LISPRO 8 UNITS: 100 INJECTION, SOLUTION INTRAVENOUS; SUBCUTANEOUS at 13:01

## 2020-03-28 RX ADMIN — OXYCODONE HYDROCHLORIDE AND ACETAMINOPHEN 1 TABLET: 5; 325 TABLET ORAL at 06:55

## 2020-03-28 RX ADMIN — INSULIN LISPRO 8 UNITS: 100 INJECTION, SOLUTION INTRAVENOUS; SUBCUTANEOUS at 08:39

## 2020-03-28 RX ADMIN — BENZTROPINE MESYLATE 1 MG: 1 TABLET ORAL at 20:50

## 2020-03-28 RX ADMIN — OXYCODONE HYDROCHLORIDE AND ACETAMINOPHEN 1 TABLET: 5; 325 TABLET ORAL at 02:32

## 2020-03-28 RX ADMIN — INSULIN LISPRO 2 UNITS: 100 INJECTION, SOLUTION INTRAVENOUS; SUBCUTANEOUS at 16:30

## 2020-03-28 RX ADMIN — ACETAMINOPHEN 650 MG: 325 TABLET ORAL at 00:51

## 2020-03-28 RX ADMIN — ARIPIPRAZOLE 10 MG: 10 TABLET ORAL at 20:49

## 2020-03-28 RX ADMIN — Medication 10 ML: at 08:41

## 2020-03-28 RX ADMIN — HEPARIN SODIUM 5000 UNITS: 5000 INJECTION INTRAVENOUS; SUBCUTANEOUS at 15:36

## 2020-03-28 RX ADMIN — HEPARIN SODIUM 5000 UNITS: 5000 INJECTION INTRAVENOUS; SUBCUTANEOUS at 06:56

## 2020-03-28 RX ADMIN — AMOXICILLIN AND CLAVULANATE POTASSIUM 1 TABLET: 875; 125 TABLET, FILM COATED ORAL at 08:30

## 2020-03-28 RX ADMIN — POTASSIUM CHLORIDE 40 MEQ: 20 TABLET, EXTENDED RELEASE ORAL at 16:35

## 2020-03-28 RX ADMIN — Medication 10 ML: at 20:49

## 2020-03-28 RX ADMIN — POLYETHYLENE GLYCOL 3350 17 G: 17 POWDER, FOR SOLUTION ORAL at 08:29

## 2020-03-28 RX ADMIN — OXYCODONE HYDROCHLORIDE AND ACETAMINOPHEN 1 TABLET: 5; 325 TABLET ORAL at 13:09

## 2020-03-28 RX ADMIN — ASPIRIN 81 MG: 81 TABLET, COATED ORAL at 08:30

## 2020-03-28 ASSESSMENT — PAIN SCALES - GENERAL
PAINLEVEL_OUTOF10: 6
PAINLEVEL_OUTOF10: 7
PAINLEVEL_OUTOF10: 6
PAINLEVEL_OUTOF10: 7

## 2020-03-28 NOTE — PROGRESS NOTES
[facility]       Progress Note           Subjective/   62y.o. year old female who we are seeing in consultation for A/CKD. HPI:  Renal function stable with lasix drip, urine output of 2.7 liters over last 24 hours. Still with sob with exertion, weights still up from baseline ~245 pounds. ROS:  Intake adequate. Objective/   GEN:  Chronically ill, /69   Pulse 88   Temp 97.6 °F (36.4 °C) (Oral)   Resp 18   Ht 5' 4.5\" (1.638 m)   Wt 267 lb (121.1 kg)   LMP 10/24/2012   SpO2 95%   BMI 45.12 kg/m²   HEENT: non-icteric, no JVD  CV: S1, S2 without m/r/g; ++ LE edema in wraps  RESP: CTA B without w/r/r; breathing wnl  ABD: +bs, soft, distended, nt, no hsm  SKIN: warm, no rashes    Data/  Recent Labs     03/26/20  0530 03/27/20  0436 03/28/20  0451   WBC 8.8 9.0 7.9   HGB 9.5* 9.0* 9.1*   HCT 29.0* 28.2* 27.8*   MCV 85.4 86.2 86.1    307 320     Recent Labs     03/26/20  0530 03/27/20  0436 03/27/20  1321 03/28/20  0451    133*  --  135*   K 3.7 2.9* 2.9* 3.9   CL 93* 89*  --  93*   CO2 30 28  --  27   GLUCOSE 108* 123*  --  123*   PHOS 2.9 3.1  --  3.1   MG  --  2.00  --   --    BUN 49* 47*  --  43*   CREATININE 1.5* 1.4*  --  1.4*   LABGLOM 36* 39*  --  39*   GFRAA 43* 47*  --  47*       Assessment/     - A/CKD: The patient has chronic kidney disease with a baseline creatinine of 1.5 to 1.7. Her SHAUNNA is likely secondary to a non oliguric ATN in the setting of hypotension and renal hypoperfusion. Resumed trial of lasix infusion at 5 mg/hour today. - Edema: Continue with ace wraps as tolerated. Continue lasix infusion at lower dose of 5 mg/hour. Increased fluid restriction to 1.5 liters/day. - Hyponatremia: Stable s/p IV lasix, continue daily free water restriction of 1.5 liters/day. - Hyperkalemia: Resolved s/p medical therapy. Now requiring prn replacement with diuresis. Started on KCL 40 meq po bid.     - Hypotension: Blood pressure improving with scheduled Midodrine and IV Albumin. - Anemia: Stable hemoglobin, monitor.

## 2020-03-28 NOTE — PROGRESS NOTES
surgical revascularization option. Hold Clopidogrel. BLE ZACH's 4/18/19:   1. There is severe arterial insufficiency at rest involving the right lower    extremity. There occlusive disease of the right proximal superficial femoral    (noted stent) and mid posterior tibial arteries. There is a 50-74% stenosis    at 4the right deep femoral artery with evidence of inflow disease.    2. There is severe arterial insufficiency at rest involving the left lower    extremity. There is occlusive disease of the left proximal superficial    femoral artery (noted stent). There is a 30-49% stenosis at the left deep    femoral artery with evidence of inflow disease.         ECHO 4/3/19:   Fadumo Kitchen systolic function is mildly reduced with EF estimated at 40-45%.   There is severe HK of the apex and apical-septal segment.   Normal left ventricular diastolic filling pressure.   Mild mitral regurgitation.   Systolic pulmonary artery pressure (SPAP) estimated at 32mmHg (RA pressure 3mmHg).

## 2020-03-28 NOTE — PROGRESS NOTES
NP paged- \"Pt admitted for CHF. When originally admitted she was having issues with her BP's, 60's/30's. At 2020 it was 68/32. Pt scheduled to get midodrine TID. The day shift nurse charted against her 1700 dose, I gave it at 2026. Pt's BP now 81/50, MAP 61. Do you wish for any new orders at this time? Please advise. Thank you. \"    NP wishes to monitor BP's at this time.

## 2020-03-28 NOTE — PROGRESS NOTES
27   BUN 49* 47*  --  43*   CREATININE 1.5* 1.4*  --  1.4*   CALCIUM 9.7 9.4  --  9.5   PHOS 2.9 3.1  --  3.1     Urinalysis:      Lab Results   Component Value Date    NITRU Negative 03/10/2020    WBCUA 3-5 09/28/2019    BACTERIA Rare 09/28/2019    RBCUA None seen 09/28/2019    BLOODU Negative 03/10/2020    SPECGRAV 1.010 03/10/2020    GLUCOSEU Negative 03/10/2020       Radiology:  CT CHEST WO CONTRAST   Final Result   Post median sternotomy changes, minor induration and small amount of gas or   air along the sternotomy wound. No fluid collection to indicate abscess. Cardiomegaly with small left pleural effusion and left basilar atelectasis or   developing infection. No pulmonary edema.              Assessment/Plan:    Active Hospital Problems    Diagnosis    Hypotension [I95.9]    Pulmonary hypertension ([facility] [address] [I27.20]    CHF (congestive heart failure), NYHA class I, acute on chronic, combined (HCC) [I50.43]    Acute kidney injury superimposed on chronic kidney disease (HCC) [N17.9, N18.9]       Acute on chronic systolic heart failure  - marked weight gain, volume overload noted, elevated BNP on admission  - last echo 1/20 with EF 25%, severe anterior hypokinesis, grade II diastolic dysfunction  - cardiology consulted/following, pt initially started on IV push lasix however unable to tolerate due to SHAUNNA and hypotension, placed on 2 day lasix holiday then started on lasix drip on 3/24  - unable to tolerate BB, ACEi/ARB due to hypotension  - continue daily weights (admission weight 282 lbs, current weight 267 lbs)  - continue close I's and O's, daily BMP     SHAUNNA with baseline CKD stage III  - likely ATN from persistent hypotension and renal hypoperfusion  - serum Cr peaked to 3.5, currently 1.4  - nephrology consulted/following  - goal BP >100  - continue to monitor closely    Hypokalemia:  - iatrogenic from diuresis, continue scheduled K replacement     Hypotension  - likely 2/2 heart failure  - no

## 2020-03-29 LAB
ALBUMIN SERPL-MCNC: 3.6 G/DL (ref 3.4–5)
ANION GAP SERPL CALCULATED.3IONS-SCNC: 16 MMOL/L (ref 3–16)
BUN BLDV-MCNC: 42 MG/DL (ref 7–20)
CALCIUM SERPL-MCNC: 9.7 MG/DL (ref 8.3–10.6)
CHLORIDE BLD-SCNC: 94 MMOL/L (ref 99–110)
CO2: 28 MMOL/L (ref 21–32)
CREAT SERPL-MCNC: 1.4 MG/DL (ref 0.6–1.1)
GFR AFRICAN AMERICAN: 47
GFR NON-AFRICAN AMERICAN: 39
GLUCOSE BLD-MCNC: 108 MG/DL (ref 70–99)
GLUCOSE BLD-MCNC: 119 MG/DL (ref 70–99)
GLUCOSE BLD-MCNC: 122 MG/DL (ref 70–99)
GLUCOSE BLD-MCNC: 160 MG/DL (ref 70–99)
GLUCOSE BLD-MCNC: 162 MG/DL (ref 70–99)
HCT VFR BLD CALC: 29.4 % (ref 36–48)
HEMOGLOBIN: 9.2 G/DL (ref 12–16)
MCH RBC QN AUTO: 27.4 PG (ref 26–34)
MCHC RBC AUTO-ENTMCNC: 31.3 G/DL (ref 31–36)
MCV RBC AUTO: 87.5 FL (ref 80–100)
PDW BLD-RTO: 19.3 % (ref 12.4–15.4)
PERFORMED ON: ABNORMAL
PHOSPHORUS: 3.4 MG/DL (ref 2.5–4.9)
PLATELET # BLD: 332 K/UL (ref 135–450)
PMV BLD AUTO: 8.4 FL (ref 5–10.5)
POTASSIUM SERPL-SCNC: 3.3 MMOL/L (ref 3.5–5.1)
RBC # BLD: 3.36 M/UL (ref 4–5.2)
SODIUM BLD-SCNC: 138 MMOL/L (ref 136–145)
WBC # BLD: 7.3 K/UL (ref 4–11)

## 2020-03-29 PROCEDURE — 99232 SBSQ HOSP IP/OBS MODERATE 35: CPT | Performed by: NURSE PRACTITIONER

## 2020-03-29 PROCEDURE — 6370000000 HC RX 637 (ALT 250 FOR IP): Performed by: NURSE PRACTITIONER

## 2020-03-29 PROCEDURE — 80069 RENAL FUNCTION PANEL: CPT

## 2020-03-29 PROCEDURE — 2580000003 HC RX 258: Performed by: INTERNAL MEDICINE

## 2020-03-29 PROCEDURE — 94640 AIRWAY INHALATION TREATMENT: CPT

## 2020-03-29 PROCEDURE — 6360000002 HC RX W HCPCS: Performed by: INTERNAL MEDICINE

## 2020-03-29 PROCEDURE — 6370000000 HC RX 637 (ALT 250 FOR IP): Performed by: INTERNAL MEDICINE

## 2020-03-29 PROCEDURE — 36415 COLL VENOUS BLD VENIPUNCTURE: CPT

## 2020-03-29 PROCEDURE — 51702 INSERT TEMP BLADDER CATH: CPT

## 2020-03-29 PROCEDURE — 2060000000 HC ICU INTERMEDIATE R&B

## 2020-03-29 PROCEDURE — 85027 COMPLETE CBC AUTOMATED: CPT

## 2020-03-29 RX ORDER — POTASSIUM CHLORIDE 20 MEQ/1
20 TABLET, EXTENDED RELEASE ORAL 2 TIMES DAILY
Status: COMPLETED | OUTPATIENT
Start: 2020-03-29 | End: 2020-03-29

## 2020-03-29 RX ADMIN — POTASSIUM CHLORIDE 20 MEQ: 1500 TABLET, EXTENDED RELEASE ORAL at 20:46

## 2020-03-29 RX ADMIN — CYCLOBENZAPRINE 10 MG: 10 TABLET, FILM COATED ORAL at 08:20

## 2020-03-29 RX ADMIN — LAMOTRIGINE 150 MG: 100 TABLET ORAL at 08:21

## 2020-03-29 RX ADMIN — INSULIN LISPRO 2 UNITS: 100 INJECTION, SOLUTION INTRAVENOUS; SUBCUTANEOUS at 17:00

## 2020-03-29 RX ADMIN — POTASSIUM CHLORIDE 40 MEQ: 20 TABLET, EXTENDED RELEASE ORAL at 16:58

## 2020-03-29 RX ADMIN — POTASSIUM CHLORIDE 20 MEQ: 1500 TABLET, EXTENDED RELEASE ORAL at 10:58

## 2020-03-29 RX ADMIN — Medication 10 ML: at 20:47

## 2020-03-29 RX ADMIN — MIDODRINE HYDROCHLORIDE 10 MG: 5 TABLET ORAL at 08:22

## 2020-03-29 RX ADMIN — INSULIN LISPRO 1 UNITS: 100 INJECTION, SOLUTION INTRAVENOUS; SUBCUTANEOUS at 20:47

## 2020-03-29 RX ADMIN — BENZTROPINE MESYLATE 1 MG: 1 TABLET ORAL at 20:58

## 2020-03-29 RX ADMIN — BUSPIRONE HYDROCHLORIDE 30 MG: 5 TABLET ORAL at 08:21

## 2020-03-29 RX ADMIN — LAMOTRIGINE 150 MG: 100 TABLET ORAL at 20:45

## 2020-03-29 RX ADMIN — FUROSEMIDE 5 MG/HR: 10 INJECTION, SOLUTION INTRAMUSCULAR; INTRAVENOUS at 15:36

## 2020-03-29 RX ADMIN — ASPIRIN 81 MG: 81 TABLET, COATED ORAL at 08:24

## 2020-03-29 RX ADMIN — MIDODRINE HYDROCHLORIDE 10 MG: 5 TABLET ORAL at 16:58

## 2020-03-29 RX ADMIN — CLOPIDOGREL BISULFATE 75 MG: 75 TABLET ORAL at 08:21

## 2020-03-29 RX ADMIN — BUSPIRONE HYDROCHLORIDE 30 MG: 5 TABLET ORAL at 20:46

## 2020-03-29 RX ADMIN — OXYCODONE HYDROCHLORIDE AND ACETAMINOPHEN 1 TABLET: 5; 325 TABLET ORAL at 11:25

## 2020-03-29 RX ADMIN — INSULIN LISPRO 8 UNITS: 100 INJECTION, SOLUTION INTRAVENOUS; SUBCUTANEOUS at 08:30

## 2020-03-29 RX ADMIN — HEPARIN SODIUM 5000 UNITS: 5000 INJECTION INTRAVENOUS; SUBCUTANEOUS at 05:01

## 2020-03-29 RX ADMIN — MIDODRINE HYDROCHLORIDE 10 MG: 5 TABLET ORAL at 12:20

## 2020-03-29 RX ADMIN — MICONAZOLE NITRATE: 20 POWDER TOPICAL at 08:33

## 2020-03-29 RX ADMIN — POTASSIUM CHLORIDE 40 MEQ: 20 TABLET, EXTENDED RELEASE ORAL at 08:22

## 2020-03-29 RX ADMIN — ATORVASTATIN CALCIUM 80 MG: 80 TABLET, FILM COATED ORAL at 20:46

## 2020-03-29 RX ADMIN — OXYCODONE HYDROCHLORIDE AND ACETAMINOPHEN 1 TABLET: 5; 325 TABLET ORAL at 20:50

## 2020-03-29 RX ADMIN — INSULIN LISPRO 8 UNITS: 100 INJECTION, SOLUTION INTRAVENOUS; SUBCUTANEOUS at 17:00

## 2020-03-29 RX ADMIN — POLYETHYLENE GLYCOL 3350 17 G: 17 POWDER, FOR SOLUTION ORAL at 08:22

## 2020-03-29 RX ADMIN — HEPARIN SODIUM 5000 UNITS: 5000 INJECTION INTRAVENOUS; SUBCUTANEOUS at 15:37

## 2020-03-29 RX ADMIN — PANTOPRAZOLE SODIUM 40 MG: 40 TABLET, DELAYED RELEASE ORAL at 16:58

## 2020-03-29 RX ADMIN — OXYCODONE HYDROCHLORIDE AND ACETAMINOPHEN 1 TABLET: 5; 325 TABLET ORAL at 05:01

## 2020-03-29 RX ADMIN — PANTOPRAZOLE SODIUM 40 MG: 40 TABLET, DELAYED RELEASE ORAL at 08:24

## 2020-03-29 RX ADMIN — ARIPIPRAZOLE 10 MG: 10 TABLET ORAL at 20:45

## 2020-03-29 RX ADMIN — INSULIN LISPRO 8 UNITS: 100 INJECTION, SOLUTION INTRAVENOUS; SUBCUTANEOUS at 12:19

## 2020-03-29 RX ADMIN — TIOTROPIUM BROMIDE INHALATION SPRAY 2 PUFF: 3.12 SPRAY, METERED RESPIRATORY (INHALATION) at 08:15

## 2020-03-29 RX ADMIN — HEPARIN SODIUM 5000 UNITS: 5000 INJECTION INTRAVENOUS; SUBCUTANEOUS at 21:29

## 2020-03-29 ASSESSMENT — PAIN DESCRIPTION - LOCATION: LOCATION: SHOULDER

## 2020-03-29 ASSESSMENT — PAIN SCALES - GENERAL
PAINLEVEL_OUTOF10: 7
PAINLEVEL_OUTOF10: 6
PAINLEVEL_OUTOF10: 7
PAINLEVEL_OUTOF10: 3
PAINLEVEL_OUTOF10: 6

## 2020-03-29 ASSESSMENT — PAIN DESCRIPTION - PAIN TYPE: TYPE: ACUTE PAIN

## 2020-03-29 ASSESSMENT — PAIN DESCRIPTION - ORIENTATION: ORIENTATION: LEFT

## 2020-03-29 NOTE — PROGRESS NOTES
10 mg Oral TID WC     PRN Meds: oxyCODONE-acetaminophen, magnesium hydroxide, cyclobenzaprine, sodium chloride flush, acetaminophen **OR** acetaminophen, promethazine **OR** ondansetron, glucose, dextrose, glucagon (rDNA), dextrose, albuterol      Intake/Output Summary (Last 24 hours) at 3/29/2020 1254  Last data filed at 3/29/2020 1223  Gross per 24 hour   Intake 1020 ml   Output 5000 ml   Net -3980 ml       Physical Exam Performed:    /82   Pulse 91   Temp 97.6 °F (36.4 °C) (Oral)   Resp 18   Ht 5' 4.5\" (1.638 m)   Wt 262 lb 14.4 oz (119.3 kg)   LMP 10/24/2012   SpO2 97%   BMI 44.43 kg/m²     General appearance:  No apparent distress, appears stated age and cooperative. HEENT:  Normal cephalic, atraumatic without obvious deformity. Pupils equal, round, and reactive to light. Extra ocular muscles intact. Conjunctivae/corneas clear. Neck: Supple, with full range of motion. No jugular venous distention. Trachea midline. Respiratory:  Normal respiratory effort. Clear to auscultation, bilaterally without Rales/Wheezes/Rhonchi. Cardiovascular:  Regular rate and rhythm with normal S1/S2 without murmurs, rubs or gallops. Abdomen: Soft, non-tender, non-distended with normal bowel sounds. Musculoskeletal:  No clubbing, cyanosis. Marked LE edema bilaterally. Full range of motion without deformity. Skin: Skin color, texture, turgor normal.  No rashes or lesions. Neurologic:  Neurovascularly intact without any focal sensory/motor deficits.  Cranial nerves: II-XII intact, grossly non-focal.  Psychiatric:  Alert and oriented, thought content appropriate, normal insight  Capillary Refill: Brisk,< 3 seconds   Peripheral Pulses: +2 palpable, equal bilaterally     Labs:   Recent Labs     03/27/20  0436 03/28/20  0451 03/29/20  0450   WBC 9.0 7.9 7.3   HGB 9.0* 9.1* 9.2*   HCT 28.2* 27.8* 29.4*    320 332     Recent Labs     03/27/20  0436 03/27/20  1321 03/28/20  0451 03/29/20  0450   *  --  135* 138   K 2.9* 2.9* 3.9 3.3*   CL 89*  --  93* 94*   CO2 28  --  27 28   BUN 47*  --  43* 42*   CREATININE 1.4*  --  1.4* 1.4*   CALCIUM 9.4  --  9.5 9.7   PHOS 3.1  --  3.1 3.4     Urinalysis:      Lab Results   Component Value Date    NITRU Negative 03/10/2020    WBCUA 3-5 09/28/2019    BACTERIA Rare 09/28/2019    RBCUA None seen 09/28/2019    BLOODU Negative 03/10/2020    SPECGRAV 1.010 03/10/2020    GLUCOSEU Negative 03/10/2020       Radiology:  CT CHEST WO CONTRAST   Final Result   Post median sternotomy changes, minor induration and small amount of gas or   air along the sternotomy wound. No fluid collection to indicate abscess. Cardiomegaly with small left pleural effusion and left basilar atelectasis or   developing infection. No pulmonary edema.              Assessment/Plan:    Active Hospital Problems    Diagnosis    Hypotension [I95.9]    Pulmonary hypertension ([facility] [address] [I27.20]    CHF (congestive heart failure), NYHA class I, acute on chronic, combined (HCC) [I50.43]    Acute kidney injury superimposed on chronic kidney disease (HCC) [N17.9, N18.9]       Acute on chronic systolic heart failure  - marked weight gain, volume overload noted, elevated BNP on admission  - last echo 1/20 with EF 25%, severe anterior hypokinesis, grade II diastolic dysfunction  - cardiology consulted/following, pt initially started on IV push lasix however unable to tolerate due to hypotension, placed on 2 day lasix holiday then started on lasix drip on 3/24 (has been intermittently paused due to hypokalemia)  - unable to tolerate BB, ACEi/ARB due to hypotension  - continue daily weights (admission weight 282 lbs, current weight 262 lbs, target dry weight of 245 lbs)  - continue close I's and O's, daily BMP     SHAUNNA with baseline CKD stage III  - likely ATN from persistent hypotension and renal hypoperfusion  - serum Cr peaked to 3.5, currently 1.4  - nephrology consulted/following  - goal BP >100  - continue to monitor

## 2020-03-29 NOTE — PROGRESS NOTES
[facility]       Progress Note           Subjective/   62y.o. year old female who we are seeing in consultation for A/CKD. HPI:  Renal function stable with lasix drip, urine output of 3.7 liters over last 24 hours. Still with sob with exertion, improving, weights trending down, still up from baseline ~245 pounds. ROS:  Intake adequate. Objective/   GEN:  Chronically ill, /82   Pulse 91   Temp 97.6 °F (36.4 °C) (Oral)   Resp 18   Ht 5' 4.5\" (1.638 m)   Wt 262 lb 14.4 oz (119.3 kg)   LMP 10/24/2012   SpO2 97%   BMI 44.43 kg/m²   HEENT: non-icteric, no JVD  CV: S1, S2 without m/r/g; ++ LE edema in wraps  RESP: CTA B without w/r/r; breathing wnl  ABD: +bs, soft, distended, nt, no hsm  SKIN: warm, no rashes    Data/  Recent Labs     03/27/20  0436 03/28/20  0451 03/29/20  0450   WBC 9.0 7.9 7.3   HGB 9.0* 9.1* 9.2*   HCT 28.2* 27.8* 29.4*   MCV 86.2 86.1 87.5    320 332     Recent Labs     03/27/20  0436 03/27/20  1321 03/28/20  0451 03/29/20  0450   *  --  135* 138   K 2.9* 2.9* 3.9 3.3*   CL 89*  --  93* 94*   CO2 28  --  27 28   GLUCOSE 123*  --  123* 108*   PHOS 3.1  --  3.1 3.4   MG 2.00  --   --   --    BUN 47*  --  43* 42*   CREATININE 1.4*  --  1.4* 1.4*   LABGLOM 39*  --  39* 39*   GFRAA 47*  --  47* 47*       Assessment/     - A/CKD: The patient has chronic kidney disease with a baseline creatinine of 1.5 to 1.7. Her SHAUNNA is likely secondary to a non oliguric ATN in the setting of hypotension and renal hypoperfusion. Continue lasix infusion at 5 mg/hour.    - Edema: Continue with ace wraps as tolerated. Continue lasix infusion at lower dose of 5 mg/hour. Fluid restriction 1.5 liters/day. - Hyponatremia: Stable s/p IV lasix, continue daily free water restriction of 1.5 liters/day. - Hyperkalemia: Resolved s/p medical therapy. Now requiring prn replacement with diuresis. Started on KCL - increased to 60 meq po bid today.     - Hypotension: Blood

## 2020-03-29 NOTE — PLAN OF CARE
breath. Pt without lower extremity edema.      Patient and/or Family's stated Goal of Care this Admission: reduce shortness of breath prior to discharge        :

## 2020-03-29 NOTE — FLOWSHEET NOTE
Wound 03/10/20 Toe (Comment  which one) Anterior; Left Unstageable   Date First Assessed/Time First Assessed: 03/10/20 1230   Present on Hospital Admission: Yes  Primary Wound Type: Diabetic Ulcer  Location: (c) Toe (Comment  which one)  Wound Location Orientation: Anterior; Left  Wound Description (Comments): Unstageable   Dressing Status Clean;Dry; Intact   Dressing/Treatment Open to air   Wound Assessment Dry; Intact; Black   Wound 03/27/20 Foot Right;Plantar   Date First Assessed/Time First Assessed: 03/27/20 1520   Present on Hospital Admission: Yes  Primary Wound Type: Diabetic Ulcer  Location: Foot  Wound Location Orientation: Right;Plantar   Dressing Status Clean;Dry; Intact   Urethral Catheter Latex 16 fr   Placement Date/Time: 03/20/20 2136   Urethral Catheter Timeout: Patient;Sterile technique  Inserted by: RAYMUNDO Cagle  Catheter Type: Latex  Tube Size (fr): 16 fr  Catheter Balloon Size: 10 mL  Collection Container: Standard  Securement Method: Securing d. .. Catheter Indications Need for fluid management in critically ill patients in a critical care setting not able to be managed by other means such as BSC with hat, bedpan, urinal, condom catheter, or short term intermittent urethral catherization   Site Assessment No urethral drainage   Urine Color Yellow   Urine Appearance Clear   Incision 03/10/20 Sternum   Date First Assessed/Time First Assessed: 03/10/20 1229   Present on Hospital Admission: Yes  Wound Approximate Age at First Assessment (Weeks): 6 weeks  Primary Wound Type: (c) Incision  Location: Sternum   Wound Assessment Other (Comment)  (GEORGE covered by dressing)   Incision 01/27/20 Sternum   Date First Assessed: 01/27/20   Primary Wound Type: Incision  Location: Sternum   Wound Assessment Other (Comment)  (GEORGE, dressing in place)   Dressing Status Clean;Dry; Intact   Psychosocial   Psychosocial (WDL) WDL

## 2020-03-30 LAB
ALBUMIN SERPL-MCNC: 3.6 G/DL (ref 3.4–5)
ANION GAP SERPL CALCULATED.3IONS-SCNC: 17 MMOL/L (ref 3–16)
BUN BLDV-MCNC: 42 MG/DL (ref 7–20)
CALCIUM SERPL-MCNC: 9.6 MG/DL (ref 8.3–10.6)
CHLORIDE BLD-SCNC: 94 MMOL/L (ref 99–110)
CO2: 27 MMOL/L (ref 21–32)
CREAT SERPL-MCNC: 1.3 MG/DL (ref 0.6–1.1)
GFR AFRICAN AMERICAN: 51
GFR NON-AFRICAN AMERICAN: 42
GLUCOSE BLD-MCNC: 132 MG/DL (ref 70–99)
GLUCOSE BLD-MCNC: 138 MG/DL (ref 70–99)
GLUCOSE BLD-MCNC: 141 MG/DL (ref 70–99)
GLUCOSE BLD-MCNC: 151 MG/DL (ref 70–99)
GLUCOSE BLD-MCNC: 160 MG/DL (ref 70–99)
HCT VFR BLD CALC: 28.8 % (ref 36–48)
HEMOGLOBIN: 9.1 G/DL (ref 12–16)
MCH RBC QN AUTO: 27.5 PG (ref 26–34)
MCHC RBC AUTO-ENTMCNC: 31.6 G/DL (ref 31–36)
MCV RBC AUTO: 87.2 FL (ref 80–100)
PDW BLD-RTO: 19.2 % (ref 12.4–15.4)
PERFORMED ON: ABNORMAL
PHOSPHORUS: 3.1 MG/DL (ref 2.5–4.9)
PLATELET # BLD: 354 K/UL (ref 135–450)
PMV BLD AUTO: 8.6 FL (ref 5–10.5)
POTASSIUM SERPL-SCNC: 3.7 MMOL/L (ref 3.5–5.1)
RBC # BLD: 3.3 M/UL (ref 4–5.2)
SODIUM BLD-SCNC: 138 MMOL/L (ref 136–145)
WBC # BLD: 7.8 K/UL (ref 4–11)

## 2020-03-30 PROCEDURE — 6360000002 HC RX W HCPCS: Performed by: INTERNAL MEDICINE

## 2020-03-30 PROCEDURE — 2580000003 HC RX 258: Performed by: INTERNAL MEDICINE

## 2020-03-30 PROCEDURE — 85027 COMPLETE CBC AUTOMATED: CPT

## 2020-03-30 PROCEDURE — 6370000000 HC RX 637 (ALT 250 FOR IP): Performed by: INTERNAL MEDICINE

## 2020-03-30 PROCEDURE — 2060000000 HC ICU INTERMEDIATE R&B

## 2020-03-30 PROCEDURE — 36415 COLL VENOUS BLD VENIPUNCTURE: CPT

## 2020-03-30 PROCEDURE — 99233 SBSQ HOSP IP/OBS HIGH 50: CPT | Performed by: NURSE PRACTITIONER

## 2020-03-30 PROCEDURE — 80069 RENAL FUNCTION PANEL: CPT

## 2020-03-30 PROCEDURE — 6370000000 HC RX 637 (ALT 250 FOR IP): Performed by: NURSE PRACTITIONER

## 2020-03-30 RX ADMIN — POTASSIUM CHLORIDE 40 MEQ: 20 TABLET, EXTENDED RELEASE ORAL at 08:26

## 2020-03-30 RX ADMIN — MICONAZOLE NITRATE: 20 POWDER TOPICAL at 08:30

## 2020-03-30 RX ADMIN — MIDODRINE HYDROCHLORIDE 10 MG: 5 TABLET ORAL at 08:26

## 2020-03-30 RX ADMIN — FUROSEMIDE 5 MG/HR: 10 INJECTION, SOLUTION INTRAMUSCULAR; INTRAVENOUS at 10:16

## 2020-03-30 RX ADMIN — HEPARIN SODIUM 5000 UNITS: 5000 INJECTION INTRAVENOUS; SUBCUTANEOUS at 22:20

## 2020-03-30 RX ADMIN — CYCLOBENZAPRINE 10 MG: 10 TABLET, FILM COATED ORAL at 12:15

## 2020-03-30 RX ADMIN — MIDODRINE HYDROCHLORIDE 10 MG: 5 TABLET ORAL at 11:53

## 2020-03-30 RX ADMIN — LAMOTRIGINE 150 MG: 100 TABLET ORAL at 08:26

## 2020-03-30 RX ADMIN — ASPIRIN 81 MG: 81 TABLET, COATED ORAL at 08:31

## 2020-03-30 RX ADMIN — INSULIN LISPRO 2 UNITS: 100 INJECTION, SOLUTION INTRAVENOUS; SUBCUTANEOUS at 08:02

## 2020-03-30 RX ADMIN — LAMOTRIGINE 150 MG: 100 TABLET ORAL at 22:16

## 2020-03-30 RX ADMIN — CYCLOBENZAPRINE 10 MG: 10 TABLET, FILM COATED ORAL at 03:42

## 2020-03-30 RX ADMIN — INSULIN LISPRO 1 UNITS: 100 INJECTION, SOLUTION INTRAVENOUS; SUBCUTANEOUS at 22:20

## 2020-03-30 RX ADMIN — MIDODRINE HYDROCHLORIDE 10 MG: 5 TABLET ORAL at 17:05

## 2020-03-30 RX ADMIN — BUSPIRONE HYDROCHLORIDE 30 MG: 5 TABLET ORAL at 22:15

## 2020-03-30 RX ADMIN — CLOPIDOGREL BISULFATE 75 MG: 75 TABLET ORAL at 08:26

## 2020-03-30 RX ADMIN — PANTOPRAZOLE SODIUM 40 MG: 40 TABLET, DELAYED RELEASE ORAL at 17:05

## 2020-03-30 RX ADMIN — POTASSIUM CHLORIDE 40 MEQ: 20 TABLET, EXTENDED RELEASE ORAL at 17:05

## 2020-03-30 RX ADMIN — OXYCODONE HYDROCHLORIDE AND ACETAMINOPHEN 1 TABLET: 5; 325 TABLET ORAL at 08:34

## 2020-03-30 RX ADMIN — ARIPIPRAZOLE 10 MG: 10 TABLET ORAL at 22:16

## 2020-03-30 RX ADMIN — OXYCODONE HYDROCHLORIDE AND ACETAMINOPHEN 1 TABLET: 5; 325 TABLET ORAL at 22:16

## 2020-03-30 RX ADMIN — INSULIN LISPRO 8 UNITS: 100 INJECTION, SOLUTION INTRAVENOUS; SUBCUTANEOUS at 11:50

## 2020-03-30 RX ADMIN — ATORVASTATIN CALCIUM 80 MG: 80 TABLET, FILM COATED ORAL at 22:16

## 2020-03-30 RX ADMIN — HEPARIN SODIUM 5000 UNITS: 5000 INJECTION INTRAVENOUS; SUBCUTANEOUS at 14:09

## 2020-03-30 RX ADMIN — BENZTROPINE MESYLATE 1 MG: 1 TABLET ORAL at 22:15

## 2020-03-30 RX ADMIN — INSULIN LISPRO 8 UNITS: 100 INJECTION, SOLUTION INTRAVENOUS; SUBCUTANEOUS at 17:06

## 2020-03-30 RX ADMIN — INSULIN LISPRO 8 UNITS: 100 INJECTION, SOLUTION INTRAVENOUS; SUBCUTANEOUS at 08:02

## 2020-03-30 RX ADMIN — BUSPIRONE HYDROCHLORIDE 30 MG: 5 TABLET ORAL at 08:25

## 2020-03-30 RX ADMIN — FUROSEMIDE 10 MG/HR: 10 INJECTION, SOLUTION INTRAMUSCULAR; INTRAVENOUS at 20:37

## 2020-03-30 RX ADMIN — HEPARIN SODIUM 5000 UNITS: 5000 INJECTION INTRAVENOUS; SUBCUTANEOUS at 05:43

## 2020-03-30 RX ADMIN — PANTOPRAZOLE SODIUM 40 MG: 40 TABLET, DELAYED RELEASE ORAL at 08:26

## 2020-03-30 ASSESSMENT — PAIN DESCRIPTION - PAIN TYPE: TYPE: ACUTE PAIN

## 2020-03-30 ASSESSMENT — PAIN DESCRIPTION - LOCATION
LOCATION: SHOULDER
LOCATION: SHOULDER

## 2020-03-30 ASSESSMENT — PAIN DESCRIPTION - ORIENTATION
ORIENTATION: LEFT
ORIENTATION: LEFT

## 2020-03-30 ASSESSMENT — PAIN SCALES - GENERAL
PAINLEVEL_OUTOF10: 6
PAINLEVEL_OUTOF10: 0
PAINLEVEL_OUTOF10: 6
PAINLEVEL_OUTOF10: 2

## 2020-03-30 NOTE — PROGRESS NOTES
Hospitalist Progress Note      PCP: Walda Apley, PA-C    Date of Admission: 3/20/2020    Chief Complaint: hypotension, weight gain    Hospital Course:   62 y.o. female who presented to [facility] with hypotension and weight gain. Patient was just discharged last week for SHAUNNA and CHF exacerbation. Since her discharge, she has had persistent weight gain and increasing LE edema. She has also had persistent BP in the 16L-35Y systolic. This is common for her and she has never had symptoms associated with BP in the this range before. She has been in contact with her cardiology team since her discharge and increased her home diuretic but it had no effect on her weight gain. Over the past couple days, she has had increased HIGHTOWER as well which is typical for her when she gets volume overloaded. She denies fevers, chills, chest pain, nausea, vomiting or diarrhea. Subjective:   Doing well. 3.4 L UOP, down 2 lbs, Cr improved a little. No dyspnea or lightheadedness.        Medications:  Reviewed    Infusion Medications    furosemide (LASIX) 1mg/ml infusion 5 mg/hr (03/30/20 1016)    dextrose       Scheduled Medications    potassium chloride  40 mEq Oral BID WC    miconazole   Topical BID    polyethylene glycol  17 g Oral Daily    ARIPiprazole  10 mg Oral Daily    aspirin EC  81 mg Oral Daily    atorvastatin  80 mg Oral Nightly    benztropine  1 mg Oral Nightly    buprenorphine-naloxone  1 Film Sublingual BID    busPIRone  30 mg Oral BID    clopidogrel  75 mg Oral Daily    lamoTRIgine  150 mg Oral BID    pantoprazole  40 mg Oral BID    tiotropium  2 puff Inhalation Daily    sodium chloride flush  10 mL Intravenous 2 times per day    insulin lispro  8 Units Subcutaneous TID     insulin lispro  0-12 Units Subcutaneous TID     insulin lispro  0-6 Units Subcutaneous Nightly    heparin (porcine)  5,000 Units Subcutaneous 3 times per day    midodrine  10 mg Oral TID      PRN Meds:

## 2020-03-30 NOTE — PLAN OF CARE
Problem: Falls - Risk of:  Goal: Will remain free from falls  Description: Will remain free from falls  Outcome: Ongoing     Problem: OXYGENATION/RESPIRATORY FUNCTION  Goal: Patient will maintain patent airway  Outcome: Ongoing  Goal: Patient will achieve/maintain normal respiratory rate/effort  Description: Respiratory rate and effort will be within normal limits for the patient  Outcome: Ongoing  Note:   Patient's EF (Ejection Fraction) is less than 40%    Heart Failure Medications:  Diuretics[de-identified] Furosemide    (One of the following REQUIRED for EF <40%/SYSTOLIC FAILURE but MAY be used in EF% >40%/DIASTOLIC FAILURE)        ACE[de-identified] None        ARB[de-identified] None         ARNI[de-identified] None    (Beta Blockers)  NON- Evidenced Based Beta Blocker (for EF% >40%/DIASTOLIC FAILURE): Other    Evidenced Based Beta Blocker::(REQUIRED for EF% <40%/SYSTOLIC FAILURE) None  . .................................................................................................................................................. Patient's weights and intake/output reviewed: Yes    Patient's Last Weight: 262 lbs obtained by standing scale. Difference of 2 lbs less than last documented weight. Intake/Output Summary (Last 24 hours) at 3/30/2020 0510  Last data filed at 3/30/2020 0346  Gross per 24 hour   Intake 970.83 ml   Output 3425 ml   Net -2454.17 ml       Comorbidities Reviewed Yes    Patient has a past medical history of Anxiety and depression, Arthritis, CAD (coronary artery disease), Cardiomyopathy ([facility] [address] CHF (congestive heart failure) ([facility] [address] Chronic systolic heart failure ([facility] [address] COPD (chronic obstructive pulmonary disease) ([facility] [address] Diabetes mellitus ([address] GERD (gastroesophageal reflux disease), Hyperlipidemia, Hypertension, Hypotension, MI (myocardial infarction) ([facility] [address] Peripheral neuropathy, Peripheral vascular disease ([facility] Pulmonary HTN ([facility] Reflux, Sleep apnea, and Wears glasses.      >>For CHF and Comorbidity documentation on Education Time and Topics, please see Education Tab    Progressive Mobility Assessment:  What is this patient's Current Level of Mobility?: Ambulatory  How was this patient Mobilized today?: Edge of Bed, Up to Chair,  Up to Toilet/Shower, and Up in Room                 With Whom? Self                 Level of Difficulty/Assistance: Independent     Pt resting in bed at this time on room air. Pt denies shortness of breath. Pt with pitting lower extremity edema. Patient and/or Family's stated Goal of Care this Admission: reduce shortness of breath, increase activity tolerance, better understand heart failure and disease management, be more comfortable, and reduce lower extremity edema prior to discharge        :        Problem: HEMODYNAMIC STATUS  Goal: Patient has stable vital signs and fluid balance  Outcome: Ongoing     Problem: FLUID AND ELECTROLYTE IMBALANCE  Goal: Fluid and electrolyte balance are achieved/maintained  Outcome: Ongoing     Problem: ACTIVITY INTOLERANCE/IMPAIRED MOBILITY  Goal: Mobility/activity is maintained at optimum level for patient  Outcome: Ongoing     Problem:  Activity:  Goal: Risk for activity intolerance will decrease  Description: Risk for activity intolerance will decrease  Outcome: Ongoing     Problem: Coping:  Goal: Ability to adjust to condition or change in health will improve  Description: Ability to adjust to condition or change in health will improve  Outcome: Ongoing     Problem: Fluid Volume:  Goal: Ability to maintain a balanced intake and output will improve  Description: Ability to maintain a balanced intake and output will improve  Outcome: Ongoing     Problem: Metabolic:  Goal: Ability to maintain appropriate glucose levels will improve  Description: Ability to maintain appropriate glucose levels will improve  Outcome: Ongoing     Problem: Skin Integrity:  Goal: Risk for impaired skin integrity will decrease  Description: Risk for impaired skin integrity will decrease  Outcome:

## 2020-03-30 NOTE — PROGRESS NOTES
[facility]       Progress Note           Subjective/   62y.o. year old female who we are seeing in consultation for A/CKD. HPI:  Renal function stable with lasix drip, urine output of 3.4 liters over last 24 hours. Still with sob with exertion, improving. Weights trending down, still up from baseline ~245 pounds. ROS:  Intake adequate. Objective/   GEN:  Chronically ill, BP 94/66   Pulse 87   Temp 97.7 °F (36.5 °C) (Oral)   Resp 18   Ht 5' 4.5\" (1.638 m)   Wt 260 lb 9.6 oz (118.2 kg)   LMP 10/24/2012   SpO2 98%   BMI 44.04 kg/m²   HEENT: non-icteric, no JVD  CV: S1, S2 without m/r/g; ++ LE edema in wraps  RESP: CTA B without w/r/r; breathing wnl  ABD: +bs, soft, distended, nt, no hsm  SKIN: warm, no rashes    Data/  Recent Labs     03/28/20 0451 03/29/20  0450 03/30/20  0410   WBC 7.9 7.3 7.8   HGB 9.1* 9.2* 9.1*   HCT 27.8* 29.4* 28.8*   MCV 86.1 87.5 87.2    332 354     Recent Labs     03/28/20 0451 03/29/20  0450 03/30/20  0410   * 138 138   K 3.9 3.3* 3.7   CL 93* 94* 94*   CO2 27 28 27   GLUCOSE 123* 108* 132*   PHOS 3.1 3.4 3.1   BUN 43* 42* 42*   CREATININE 1.4* 1.4* 1.3*   LABGLOM 39* 39* 42*   GFRAA 47* 47* 51*       Assessment/     - A/CKD: The patient has chronic kidney disease with a baseline creatinine of 1.5 to 1.7. Her SHAUNNA is likely secondary to a non oliguric ATN in the setting of hypotension and renal hypoperfusion. Continue lasix infusion - attempt to increase back to 10 mg/hour today. - Edema: Continue with ace wraps as tolerated. Continue lasix infusion at lower dose of 5 mg/hour. Fluid restriction 1.5 liters/day. - Hyponatremia: Better with diuresis & free water restriction.    - Hyperkalemia: Resolved s/p medical therapy. Now requiring prn replacement with diuresis. Started on KCL 40 meq po bid. - Hypotension: Blood pressure improving with scheduled midodrine 10 mg po tid. - Anemia: Stable Hb ~9 range, monitor.

## 2020-03-30 NOTE — FLOWSHEET NOTE
03/29/20 1957   Assessment   Charting Type Shift assessment   Neurological   Neuro (WDL) WDL   Level of Consciousness 0   Orientation Level Oriented X4   Swallow Screening   Is the patient able to remain alert for testing? Yes   Was the Patient Eating a Modified Diet Prior to being Admitted? No   Is there an Existing PEG or Abdominal Feeding Tube? No   Does the patient have a Head of Bed [facility] restriction <30°? No   Is there a Tracheostomy Tube Present? No   3 oz Water Swallow Screen Pass   Amite Coma Scale   Eye Opening 4   Best Verbal Response 5   Best Motor Response 6   Silvia Coma Scale Score 15   HEENT   Right Eye Impaired vision   Left Eye Impaired vision   Nose Intact   Neck No tracheal deviation   Tongue Pink & moist   Voice Normal   Teeth Missing teeth   Respiratory   Respiratory Pattern Regular   Respiratory Depth Normal   Respiratory Quality/Effort Unlabored   Chest Assessment Chest expansion symmetrical   Breath Sounds   Right Upper Lobe Diminished   Right Middle Lobe Diminished   Right Lower Lobe Diminished   Left Upper Lobe Diminished   Left Lower Lobe Diminished   Cough/Sputum   Sputum How Obtained Spontaneous cough   Cardiac   Cardiac Regularity Regular   Heart Sounds S1, S2   Cardiac Rhythm NSR   Cardiac Monitor   Telemetry Monitor On Yes   Telemetry Audible Yes   Telemetry Alarms Set Yes   Telemetry Box Number 23   Gastrointestinal   RUQ Bowel Sounds Active   LUQ Bowel Sounds Active   RLQ Bowel Sounds Active   LLQ Bowel Sounds Active   Abdomen Inspection Rotund; Soft   Peripheral Vascular   RUE Edema Non-pitting   LUE Edema Non-pitting   RLE Edema +2;Pitting   LLE Edema +2;Pitting   Sensation RUE Full sensation   Sensation LUE Full sensation   Sensation RLE Decreased   Sensation LLE Decreased   RUE Neurovascular Assessment   Capillary Refill Less than/equal to 3 seconds   Color Appropriate for ethnicity; Ecchymosis   Temperature Warm   R Radial Pulse +2   LUE Neurovascular Assessment Capillary Refill Less than/equal to 3 seconds   Color Appropriate for ethnicity; Ecchymosis   Temperature Warm   L Radial Pulse +2   RLE Neurovascular Assessment   Capillary Refill Less than/equal to 3 seconds   Color Appropriate for ethnicity; Red   Temperature Warm   R Pedal Pulse +1   LLE Neurovascular Assessment   Capillary Refill Less than/equal to 3 seconds   Color Appropriate for ethnicity; Red   Temperature Warm   L Pedal Pulse +1   Skin Color/Condition   Skin Color Appropriate for ethnicity; Ecchymosis   Skin Integrity   Skin Integrity Abrasion;Bruising  (scabbed areas)   Location scattered   Assessed this shift? Yes   Skin Fold Management Yes   Multiple Skin Integrity Sites Yes   Dressing Site Abdominal pannus   Treatment Pharmaceutical   Assessed this shift Red;Moist   Skin Integrity Site 2   Skin Integrity Location 2 Other (Comment)  (incision s/p cabg site ( Sternum))   Preventative Dressing   (Mepilex dressing)   Skin Integrity Site 3   Skin Integrity Location 3 Abrasion    Location 3 BLE   Skin Integrity Site 4   Skin Integrity Location 4 Other (Comment)   Location 4 Left toe   Musculoskeletal   RUE Full movement   LUE Full movement   RL Extremity Full movement   LL Extremity Full movement   Genitourinary   Genitourinary (WDL)   (Pt has roche)   Flank Tenderness No   Suprapubic Tenderness No   Dysuria GEORGE   Wound 01/17/20 Toe (Comment  which one) Anterior; Left Closed brown plantar 3rd toe   Date First Assessed/Time First Assessed: 01/17/20 1745   Present on Hospital Admission: Yes  Primary Wound Type: Diabetic Ulcer  Location: (c) Toe (Comment  which one)  Wound Location Orientation: Anterior; Left  Wound Description (Comments): Closed br. .. Dressing/Treatment Open to air   Wound Assessment Dry; Intact; Brown   Shaye-wound Assessment Blanchable erythema   Wound 01/20/20 Toe (Comment  which one) Anterior; Left 1st dorsal toe lateral edge of nail, brown & dry   Date First Assessed/Time First Assessed: Dressing Status Clean;Dry; Intact   Incision 01/27/20 Sternum   Date First Assessed: 01/27/20   Primary Wound Type: Incision  Location: Sternum   Shaye-wound Assessment Dry; Intact   Psychosocial   Patient Behaviors Calm; Cooperative   Pt up to chair, alert and oriented. Shift assessment done. Updated with POC. verbalized understanding. No concerns voiced at this time. Personal belongings and call light within reach. Will continue to monitor.  ALIE

## 2020-03-30 NOTE — PLAN OF CARE
Problem: OXYGENATION/RESPIRATORY FUNCTION  Goal: Patient will maintain patent airway  3/30/2020 1025 by Jose Garcia RN  Outcome: Ongoing  3/30/2020 0510 by Eldonna Kanner, RN  Outcome: Ongoing     Patient's EF (Ejection Fraction) is less than 40%    Heart Failure Medications:  Diuretics[de-identified] Furosemide and Other    (One of the following REQUIRED for EF <40%/SYSTOLIC FAILURE but MAY be used in EF% >40%/DIASTOLIC FAILURE)        ACE[de-identified] Other        ARB[de-identified] Other         ARNI[de-identified] None    (Beta Blockers)  NON- Evidenced Based Beta Blocker (for EF% >40%/DIASTOLIC FAILURE): Other    Evidenced Based Beta Blocker::(REQUIRED for EF% <40%/SYSTOLIC FAILURE) Carvedilol- Coreg  . .................................................................................................................................................. Patient's weights and intake/output reviewed: Yes    Patient's Last Weight: 260 lbs obtained by standing scale. Difference of 2 lbs less than last documented weight. Intake/Output Summary (Last 24 hours) at 3/30/2020 1031  Last data filed at 3/30/2020 6512  Gross per 24 hour   Intake 1270.83 ml   Output 4125 ml   Net -2854.17 ml       Comorbidities Reviewed Yes    Patient has a past medical history of Anxiety and depression, Arthritis, CAD (coronary artery disease), Cardiomyopathy ([address] CHF (congestive heart failure) ([address] Chronic systolic heart failure ([address] COPD (chronic obstructive pulmonary disease) ([address] Diabetes mellitus ([address] GERD (gastroesophageal reflux disease), Hyperlipidemia, Hypertension, Hypotension, MI (myocardial infarction) ([address] Peripheral neuropathy, Peripheral vascular disease ([address] Pulmonary HTN ([address] Reflux, Sleep apnea, and Wears glasses.      >>For CHF and Comorbidity documentation on Education Time and Topics, please see Education Tab    Progressive Mobility Assessment:  What is this patient's Current Level of Mobility?: Ambulatory-Up Ad Jasmin  How was this patient Mobilized

## 2020-03-31 LAB
ALBUMIN SERPL-MCNC: 3.5 G/DL (ref 3.4–5)
ANION GAP SERPL CALCULATED.3IONS-SCNC: 15 MMOL/L (ref 3–16)
BUN BLDV-MCNC: 38 MG/DL (ref 7–20)
CALCIUM SERPL-MCNC: 9.6 MG/DL (ref 8.3–10.6)
CHLORIDE BLD-SCNC: 94 MMOL/L (ref 99–110)
CO2: 28 MMOL/L (ref 21–32)
CREAT SERPL-MCNC: 1.4 MG/DL (ref 0.6–1.1)
GFR AFRICAN AMERICAN: 47
GFR NON-AFRICAN AMERICAN: 39
GLUCOSE BLD-MCNC: 131 MG/DL (ref 70–99)
GLUCOSE BLD-MCNC: 149 MG/DL (ref 70–99)
GLUCOSE BLD-MCNC: 157 MG/DL (ref 70–99)
GLUCOSE BLD-MCNC: 188 MG/DL (ref 70–99)
GLUCOSE BLD-MCNC: 95 MG/DL (ref 70–99)
MAGNESIUM: 1.8 MG/DL (ref 1.8–2.4)
PERFORMED ON: ABNORMAL
PERFORMED ON: NORMAL
PHOSPHORUS: 3.2 MG/DL (ref 2.5–4.9)
POTASSIUM SERPL-SCNC: 3.3 MMOL/L (ref 3.5–5.1)
PRO-BNP: 2847 PG/ML (ref 0–124)
SODIUM BLD-SCNC: 137 MMOL/L (ref 136–145)

## 2020-03-31 PROCEDURE — 83880 ASSAY OF NATRIURETIC PEPTIDE: CPT

## 2020-03-31 PROCEDURE — 6370000000 HC RX 637 (ALT 250 FOR IP): Performed by: INTERNAL MEDICINE

## 2020-03-31 PROCEDURE — 6370000000 HC RX 637 (ALT 250 FOR IP): Performed by: NURSE PRACTITIONER

## 2020-03-31 PROCEDURE — 99233 SBSQ HOSP IP/OBS HIGH 50: CPT | Performed by: NURSE PRACTITIONER

## 2020-03-31 PROCEDURE — 6360000002 HC RX W HCPCS: Performed by: INTERNAL MEDICINE

## 2020-03-31 PROCEDURE — 80069 RENAL FUNCTION PANEL: CPT

## 2020-03-31 PROCEDURE — 2580000003 HC RX 258: Performed by: INTERNAL MEDICINE

## 2020-03-31 PROCEDURE — 2060000000 HC ICU INTERMEDIATE R&B

## 2020-03-31 PROCEDURE — 94640 AIRWAY INHALATION TREATMENT: CPT

## 2020-03-31 PROCEDURE — 83735 ASSAY OF MAGNESIUM: CPT

## 2020-03-31 PROCEDURE — 36415 COLL VENOUS BLD VENIPUNCTURE: CPT

## 2020-03-31 RX ORDER — POTASSIUM CHLORIDE 20 MEQ/1
20 TABLET, EXTENDED RELEASE ORAL ONCE
Status: COMPLETED | OUTPATIENT
Start: 2020-03-31 | End: 2020-03-31

## 2020-03-31 RX ORDER — SPIRONOLACTONE 25 MG/1
25 TABLET ORAL ONCE
Status: COMPLETED | OUTPATIENT
Start: 2020-03-31 | End: 2020-03-31

## 2020-03-31 RX ORDER — POTASSIUM CHLORIDE 20 MEQ/1
40 TABLET, EXTENDED RELEASE ORAL
Status: DISCONTINUED | OUTPATIENT
Start: 2020-03-31 | End: 2020-04-02 | Stop reason: HOSPADM

## 2020-03-31 RX ORDER — MAGNESIUM SULFATE 1 G/100ML
1 INJECTION INTRAVENOUS ONCE
Status: DISCONTINUED | OUTPATIENT
Start: 2020-03-31 | End: 2020-03-31

## 2020-03-31 RX ADMIN — BENZTROPINE MESYLATE 1 MG: 1 TABLET ORAL at 21:06

## 2020-03-31 RX ADMIN — INSULIN LISPRO 8 UNITS: 100 INJECTION, SOLUTION INTRAVENOUS; SUBCUTANEOUS at 07:38

## 2020-03-31 RX ADMIN — MIDODRINE HYDROCHLORIDE 10 MG: 5 TABLET ORAL at 07:37

## 2020-03-31 RX ADMIN — PANTOPRAZOLE SODIUM 40 MG: 40 TABLET, DELAYED RELEASE ORAL at 07:37

## 2020-03-31 RX ADMIN — OXYCODONE HYDROCHLORIDE AND ACETAMINOPHEN 1 TABLET: 5; 325 TABLET ORAL at 05:51

## 2020-03-31 RX ADMIN — SPIRONOLACTONE 25 MG: 25 TABLET ORAL at 11:06

## 2020-03-31 RX ADMIN — MAGNESIUM GLUCONATE 500 MG ORAL TABLET 400 MG: 500 TABLET ORAL at 21:06

## 2020-03-31 RX ADMIN — INSULIN LISPRO 2 UNITS: 100 INJECTION, SOLUTION INTRAVENOUS; SUBCUTANEOUS at 11:42

## 2020-03-31 RX ADMIN — MIDODRINE HYDROCHLORIDE 10 MG: 5 TABLET ORAL at 16:43

## 2020-03-31 RX ADMIN — HEPARIN SODIUM 5000 UNITS: 5000 INJECTION INTRAVENOUS; SUBCUTANEOUS at 05:43

## 2020-03-31 RX ADMIN — POTASSIUM CHLORIDE 20 MEQ: 1500 TABLET, EXTENDED RELEASE ORAL at 07:37

## 2020-03-31 RX ADMIN — BUSPIRONE HYDROCHLORIDE 30 MG: 5 TABLET ORAL at 21:06

## 2020-03-31 RX ADMIN — INSULIN LISPRO 2 UNITS: 100 INJECTION, SOLUTION INTRAVENOUS; SUBCUTANEOUS at 07:45

## 2020-03-31 RX ADMIN — HEPARIN SODIUM 5000 UNITS: 5000 INJECTION INTRAVENOUS; SUBCUTANEOUS at 21:07

## 2020-03-31 RX ADMIN — TIOTROPIUM BROMIDE INHALATION SPRAY 2 PUFF: 3.12 SPRAY, METERED RESPIRATORY (INHALATION) at 07:53

## 2020-03-31 RX ADMIN — INSULIN LISPRO 8 UNITS: 100 INJECTION, SOLUTION INTRAVENOUS; SUBCUTANEOUS at 16:39

## 2020-03-31 RX ADMIN — FUROSEMIDE 10 MG/HR: 10 INJECTION, SOLUTION INTRAMUSCULAR; INTRAVENOUS at 15:37

## 2020-03-31 RX ADMIN — FUROSEMIDE 10 MG/HR: 10 INJECTION, SOLUTION INTRAMUSCULAR; INTRAVENOUS at 05:43

## 2020-03-31 RX ADMIN — POTASSIUM CHLORIDE 40 MEQ: 20 TABLET, EXTENDED RELEASE ORAL at 07:37

## 2020-03-31 RX ADMIN — MIDODRINE HYDROCHLORIDE 10 MG: 5 TABLET ORAL at 11:45

## 2020-03-31 RX ADMIN — HEPARIN SODIUM 5000 UNITS: 5000 INJECTION INTRAVENOUS; SUBCUTANEOUS at 14:11

## 2020-03-31 RX ADMIN — MAGNESIUM GLUCONATE 500 MG ORAL TABLET 400 MG: 500 TABLET ORAL at 11:01

## 2020-03-31 RX ADMIN — ATORVASTATIN CALCIUM 80 MG: 80 TABLET, FILM COATED ORAL at 21:06

## 2020-03-31 RX ADMIN — POTASSIUM CHLORIDE 40 MEQ: 1500 TABLET, EXTENDED RELEASE ORAL at 16:43

## 2020-03-31 RX ADMIN — BUSPIRONE HYDROCHLORIDE 30 MG: 5 TABLET ORAL at 07:43

## 2020-03-31 RX ADMIN — INSULIN LISPRO 2 UNITS: 100 INJECTION, SOLUTION INTRAVENOUS; SUBCUTANEOUS at 16:39

## 2020-03-31 RX ADMIN — LAMOTRIGINE 150 MG: 100 TABLET ORAL at 21:06

## 2020-03-31 RX ADMIN — CLOPIDOGREL BISULFATE 75 MG: 75 TABLET ORAL at 08:27

## 2020-03-31 RX ADMIN — INSULIN LISPRO 8 UNITS: 100 INJECTION, SOLUTION INTRAVENOUS; SUBCUTANEOUS at 11:42

## 2020-03-31 RX ADMIN — MICONAZOLE NITRATE: 20 POWDER TOPICAL at 08:28

## 2020-03-31 RX ADMIN — ARIPIPRAZOLE 10 MG: 10 TABLET ORAL at 21:06

## 2020-03-31 RX ADMIN — PANTOPRAZOLE SODIUM 40 MG: 40 TABLET, DELAYED RELEASE ORAL at 16:43

## 2020-03-31 RX ADMIN — LAMOTRIGINE 150 MG: 100 TABLET ORAL at 09:39

## 2020-03-31 RX ADMIN — ASPIRIN 81 MG: 81 TABLET, COATED ORAL at 08:27

## 2020-03-31 RX ADMIN — OXYCODONE HYDROCHLORIDE AND ACETAMINOPHEN 1 TABLET: 5; 325 TABLET ORAL at 21:07

## 2020-03-31 ASSESSMENT — PAIN SCALES - GENERAL
PAINLEVEL_OUTOF10: 0
PAINLEVEL_OUTOF10: 0
PAINLEVEL_OUTOF10: 6
PAINLEVEL_OUTOF10: 0
PAINLEVEL_OUTOF10: 6

## 2020-03-31 NOTE — FLOWSHEET NOTE
Drainage Amount None   Odor None   Margins Attached edges   Shaye-wound Assessment Clean;Dry   Wound 03/10/20 Toe (Comment  which one) Anterior; Left Unstageable   Date First Assessed/Time First Assessed: 03/10/20 1230   Present on Hospital Admission: Yes  Primary Wound Type: Diabetic Ulcer  Location: (c) Toe (Comment  which one)  Wound Location Orientation: Anterior; Left  Wound Description (Comments): Unstageable   Dressing Status Clean;Dry; Intact   Dressing/Treatment Open to air   Dressing Change Due 03/28/20   Wound Assessment Dry; Intact   Drainage Amount None   Odor None   Margins Attached edges   Shaye-wound Assessment Blanchable erythema;Swelling   Black%Wound Bed 100   Wound 03/27/20 Foot Right;Plantar   Date First Assessed/Time First Assessed: 03/27/20 1520   Present on Hospital Admission: Yes  Primary Wound Type: Diabetic Ulcer  Location: Foot  Wound Location Orientation: Right;Plantar   Dressing Status Clean;Dry; Intact   Dressing Change Due 03/28/20   Wound Assessment Dry; Intact   Drainage Amount None   Odor None   Margins Unattached edges   Shaye-wound Assessment Calloused;Clean;Dry; Intact   Yellow%Wound Bed 100   Urethral Catheter Latex 16 fr   Placement Date/Time: 03/20/20 2136   Urethral Catheter Timeout: Patient;Sterile technique  Inserted by: Deonte Jaquez RN  Catheter Type: Latex  Tube Size (fr): 16 fr  Catheter Balloon Size: 10 mL  Collection Container: Standard  Securement Method: Securing d. ..    Catheter Indications Need for fluid management in critically ill patients in a critical care setting not able to be managed by other means such as BSC with hat, bedpan, urinal, condom catheter, or short term intermittent urethral catherization   Site Assessment No urethral drainage   Urine Color Yellow   Urine Appearance Clear   Incision 03/10/20 Sternum   Date First Assessed/Time First Assessed: 03/10/20 1229   Present on Hospital Admission: Yes  Wound Approximate Age at First Assessment (Weeks): 6 weeks  Primary

## 2020-03-31 NOTE — PLAN OF CARE
shortness of breath. Pt with pitting lower extremity edema.      Patient and/or Family's stated Goal of Care this Admission: increase activity tolerance, be more comfortable, and reduce lower extremity edema prior to discharge        :

## 2020-03-31 NOTE — PROGRESS NOTES
Subcutaneous 3 times per day    midodrine  10 mg Oral TID WC      furosemide (LASIX) 1mg/ml infusion 10 mg/hr (03/31/20 0543)    dextrose         Lab Data:  CBC:   Recent Labs     03/29/20  0450 03/30/20  0410   WBC 7.3 7.8   HGB 9.2* 9.1*    354     BMP:    Recent Labs     03/29/20  0450 03/30/20  0410 03/31/20  0422    138 137   K 3.3* 3.7 3.3*   CO2 28 27 28   BUN 42* 42* 38*   CREATININE 1.4* 1.3* 1.4*     INR:  No results for input(s): INR in the last 72 hours. BNP:    Recent Labs     03/31/20  0933   PROBNP 2,847*     Lab Results   Component Value Date    LVEF 38 01/24/2020     CABG 1/27/2020:  Urgent CABG X 1, with pedicled LIMA to LAD, SGC, CPB with On pump-beating heart, URI, Epiaortic ultrasound, Doppler verification of grafts, Bilateral 5 level intercostal nerve block(Exparel), Platelet gel application. Sternal plating.  Vas Cath placement.     URI 1/24/2020:  Ejection fraction is visually estimated at 35-40%.   Moderate to severe mitral regurgitation with multiple jets visualized.   ERO estimated at 0.29 cm2.   Mild tricuspid regurgitation.   The left atrial appendage shows evidence of thrombus formation and low flow   velocities.  Moderate amount of plaque in the aorta.     Echo 1/22/2020:   Technically difficult examination secondary to habitus.   The left ventricular systolic function is severely reduced with an ejection   fraction of 25 %.   There is severe hypokinesis of the anterior and apical walls consistent with   infarction within the territory of the left anterior descending artery.   Grade II diastolic dysfunction with elevated left ventricular filling   pressure.   Moderate to severe mitral regurgitation.   Mild tricuspid regurgitation.   is estimated at 53 mmHg consistent with moderate pulmonary hypertension   (Right atrial pressure of 3 mmHg).   If clinically necessary, consider URI for better evaluation of mitral   regurgitation     Coronary angiogram infection - per CT surgery  ~CLINT    cardiomems reading performed by me from 3/31/20        Plan:  potasium 40meq BID  Add Spironolactone (aldactone) 25mg today   Lasix infusion - continue 10mg/hr for today; may consider stopping infusion tomorrow switching to PO   Holding BB and ACEi due to hypotension and SHAUNNA  Continue aspirin and plavix.    On midodmonica Lane CNP, 3/31/2020, 1:23 PM

## 2020-03-31 NOTE — PROGRESS NOTES
Nephrology Progress Note   [email or web address]      This patient is a 62year old female whom we are following for SHAUNNA on CKD. Subjective: The patient was seen and examined. Net negative 2L the past 24H. Weight 282->258 lbs. Family History: No family at bedside  ROS: No nausea or vomiting      Vitals:  /75   Pulse 89   Temp 97.6 °F (36.4 °C) (Oral)   Resp 18   Ht 5' 4.5\" (1.638 m)   Wt 258 lb 11.2 oz (117.3 kg)   LMP 10/24/2012   SpO2 98%   BMI 43.72 kg/m²   I/O last 3 completed shifts: In: 800 [P.O.:800]  Out: 2800 [Urine:2800]  I/O this shift:  In: -   Out: [address] [Urine:775]    Physical Exam:  Physical Exam  Vitals signs reviewed. Constitutional:       General: She is not in acute distress. Appearance: Normal appearance. HENT:      Head: Normocephalic and atraumatic. Mouth/Throat:      Mouth: Mucous membranes are moist.   Eyes:      General: No scleral icterus. Conjunctiva/sclera: Conjunctivae normal.   Cardiovascular:      Rate and Rhythm: Normal rate. Heart sounds: No friction rub. Pulmonary:      Effort: Pulmonary effort is normal. No respiratory distress. Abdominal:      General: Bowel sounds are normal. There is no distension. Tenderness: There is no abdominal tenderness. Musculoskeletal:      Right lower leg: Edema present. Left lower leg: Edema present. Neurological:      Mental Status: She is alert.            Medications:   magnesium oxide  400 mg Oral BID    potassium chloride  40 mEq Oral BID WC    miconazole   Topical BID    polyethylene glycol  17 g Oral Daily    ARIPiprazole  10 mg Oral Daily    aspirin EC  81 mg Oral Daily    atorvastatin  80 mg Oral Nightly    benztropine  1 mg Oral Nightly    buprenorphine-naloxone  1 Film Sublingual BID    busPIRone  30 mg Oral BID    clopidogrel  75 mg Oral Daily    lamoTRIgine  150 mg Oral BID    pantoprazole  40 mg Oral BID    tiotropium  2 puff Inhalation Daily    sodium chloride flush

## 2020-04-01 LAB
ALBUMIN SERPL-MCNC: 3.5 G/DL (ref 3.4–5)
ANION GAP SERPL CALCULATED.3IONS-SCNC: 15 MMOL/L (ref 3–16)
BUN BLDV-MCNC: 37 MG/DL (ref 7–20)
CALCIUM SERPL-MCNC: 9.7 MG/DL (ref 8.3–10.6)
CHLORIDE BLD-SCNC: 95 MMOL/L (ref 99–110)
CO2: 27 MMOL/L (ref 21–32)
CREAT SERPL-MCNC: 1.4 MG/DL (ref 0.6–1.1)
GFR AFRICAN AMERICAN: 47
GFR NON-AFRICAN AMERICAN: 39
GLUCOSE BLD-MCNC: 114 MG/DL (ref 70–99)
GLUCOSE BLD-MCNC: 136 MG/DL (ref 70–99)
GLUCOSE BLD-MCNC: 136 MG/DL (ref 70–99)
GLUCOSE BLD-MCNC: 145 MG/DL (ref 70–99)
GLUCOSE BLD-MCNC: 175 MG/DL (ref 70–99)
MAGNESIUM: 1.9 MG/DL (ref 1.8–2.4)
PERFORMED ON: ABNORMAL
PHOSPHORUS: 3 MG/DL (ref 2.5–4.9)
POTASSIUM SERPL-SCNC: 3.9 MMOL/L (ref 3.5–5.1)
SODIUM BLD-SCNC: 137 MMOL/L (ref 136–145)

## 2020-04-01 PROCEDURE — 94640 AIRWAY INHALATION TREATMENT: CPT

## 2020-04-01 PROCEDURE — 6360000002 HC RX W HCPCS: Performed by: INTERNAL MEDICINE

## 2020-04-01 PROCEDURE — 6370000000 HC RX 637 (ALT 250 FOR IP): Performed by: INTERNAL MEDICINE

## 2020-04-01 PROCEDURE — 80069 RENAL FUNCTION PANEL: CPT

## 2020-04-01 PROCEDURE — 36415 COLL VENOUS BLD VENIPUNCTURE: CPT

## 2020-04-01 PROCEDURE — 99233 SBSQ HOSP IP/OBS HIGH 50: CPT | Performed by: NURSE PRACTITIONER

## 2020-04-01 PROCEDURE — 2060000000 HC ICU INTERMEDIATE R&B

## 2020-04-01 PROCEDURE — 2580000003 HC RX 258: Performed by: NURSE PRACTITIONER

## 2020-04-01 PROCEDURE — 83735 ASSAY OF MAGNESIUM: CPT

## 2020-04-01 PROCEDURE — 6370000000 HC RX 637 (ALT 250 FOR IP): Performed by: NURSE PRACTITIONER

## 2020-04-01 PROCEDURE — 2580000003 HC RX 258: Performed by: INTERNAL MEDICINE

## 2020-04-01 PROCEDURE — 6360000002 HC RX W HCPCS: Performed by: NURSE PRACTITIONER

## 2020-04-01 RX ORDER — MIDODRINE HYDROCHLORIDE 5 MG/1
2.5 TABLET ORAL DAILY
Status: COMPLETED | OUTPATIENT
Start: 2020-04-02 | End: 2020-04-02

## 2020-04-01 RX ORDER — METOLAZONE 2.5 MG/1
2.5 TABLET ORAL ONCE
Status: COMPLETED | OUTPATIENT
Start: 2020-04-01 | End: 2020-04-01

## 2020-04-01 RX ORDER — MIDODRINE HYDROCHLORIDE 5 MG/1
5 TABLET ORAL
Status: COMPLETED | OUTPATIENT
Start: 2020-04-01 | End: 2020-04-01

## 2020-04-01 RX ADMIN — POTASSIUM CHLORIDE 40 MEQ: 1500 TABLET, EXTENDED RELEASE ORAL at 08:24

## 2020-04-01 RX ADMIN — MIDODRINE HYDROCHLORIDE 10 MG: 5 TABLET ORAL at 11:34

## 2020-04-01 RX ADMIN — ASPIRIN 81 MG: 81 TABLET, COATED ORAL at 08:25

## 2020-04-01 RX ADMIN — INSULIN LISPRO 8 UNITS: 100 INJECTION, SOLUTION INTRAVENOUS; SUBCUTANEOUS at 08:26

## 2020-04-01 RX ADMIN — INSULIN LISPRO 2 UNITS: 100 INJECTION, SOLUTION INTRAVENOUS; SUBCUTANEOUS at 11:40

## 2020-04-01 RX ADMIN — BUSPIRONE HYDROCHLORIDE 30 MG: 5 TABLET ORAL at 20:41

## 2020-04-01 RX ADMIN — CLOPIDOGREL BISULFATE 75 MG: 75 TABLET ORAL at 08:24

## 2020-04-01 RX ADMIN — FUROSEMIDE 15 MG/HR: 10 INJECTION, SOLUTION INTRAMUSCULAR; INTRAVENOUS at 20:30

## 2020-04-01 RX ADMIN — PANTOPRAZOLE SODIUM 40 MG: 40 TABLET, DELAYED RELEASE ORAL at 08:24

## 2020-04-01 RX ADMIN — PANTOPRAZOLE SODIUM 40 MG: 40 TABLET, DELAYED RELEASE ORAL at 17:11

## 2020-04-01 RX ADMIN — CYCLOBENZAPRINE 10 MG: 10 TABLET, FILM COATED ORAL at 05:51

## 2020-04-01 RX ADMIN — POTASSIUM CHLORIDE 40 MEQ: 1500 TABLET, EXTENDED RELEASE ORAL at 11:34

## 2020-04-01 RX ADMIN — BUSPIRONE HYDROCHLORIDE 30 MG: 5 TABLET ORAL at 08:23

## 2020-04-01 RX ADMIN — LAMOTRIGINE 150 MG: 100 TABLET ORAL at 20:40

## 2020-04-01 RX ADMIN — OXYCODONE HYDROCHLORIDE AND ACETAMINOPHEN 1 TABLET: 5; 325 TABLET ORAL at 20:40

## 2020-04-01 RX ADMIN — MIDODRINE HYDROCHLORIDE 10 MG: 5 TABLET ORAL at 08:24

## 2020-04-01 RX ADMIN — ARIPIPRAZOLE 10 MG: 10 TABLET ORAL at 20:41

## 2020-04-01 RX ADMIN — FUROSEMIDE 10 MG/HR: 10 INJECTION, SOLUTION INTRAMUSCULAR; INTRAVENOUS at 10:31

## 2020-04-01 RX ADMIN — HEPARIN SODIUM 5000 UNITS: 5000 INJECTION INTRAVENOUS; SUBCUTANEOUS at 05:52

## 2020-04-01 RX ADMIN — ATORVASTATIN CALCIUM 80 MG: 80 TABLET, FILM COATED ORAL at 20:40

## 2020-04-01 RX ADMIN — BENZTROPINE MESYLATE 1 MG: 1 TABLET ORAL at 20:41

## 2020-04-01 RX ADMIN — INSULIN LISPRO 2 UNITS: 100 INJECTION, SOLUTION INTRAVENOUS; SUBCUTANEOUS at 08:27

## 2020-04-01 RX ADMIN — LAMOTRIGINE 150 MG: 100 TABLET ORAL at 08:24

## 2020-04-01 RX ADMIN — HEPARIN SODIUM 5000 UNITS: 5000 INJECTION INTRAVENOUS; SUBCUTANEOUS at 14:28

## 2020-04-01 RX ADMIN — MAGNESIUM GLUCONATE 500 MG ORAL TABLET 400 MG: 500 TABLET ORAL at 08:25

## 2020-04-01 RX ADMIN — FUROSEMIDE 10 MG/HR: 10 INJECTION, SOLUTION INTRAMUSCULAR; INTRAVENOUS at 01:50

## 2020-04-01 RX ADMIN — TIOTROPIUM BROMIDE INHALATION SPRAY 2 PUFF: 3.12 SPRAY, METERED RESPIRATORY (INHALATION) at 08:18

## 2020-04-01 RX ADMIN — OXYCODONE HYDROCHLORIDE AND ACETAMINOPHEN 1 TABLET: 5; 325 TABLET ORAL at 11:42

## 2020-04-01 RX ADMIN — MIDODRINE HYDROCHLORIDE 5 MG: 5 TABLET ORAL at 17:11

## 2020-04-01 RX ADMIN — HEPARIN SODIUM 5000 UNITS: 5000 INJECTION INTRAVENOUS; SUBCUTANEOUS at 20:40

## 2020-04-01 RX ADMIN — OXYCODONE HYDROCHLORIDE AND ACETAMINOPHEN 1 TABLET: 5; 325 TABLET ORAL at 15:46

## 2020-04-01 RX ADMIN — INSULIN LISPRO 8 UNITS: 100 INJECTION, SOLUTION INTRAVENOUS; SUBCUTANEOUS at 17:11

## 2020-04-01 RX ADMIN — INSULIN LISPRO 8 UNITS: 100 INJECTION, SOLUTION INTRAVENOUS; SUBCUTANEOUS at 11:41

## 2020-04-01 RX ADMIN — POTASSIUM CHLORIDE 40 MEQ: 1500 TABLET, EXTENDED RELEASE ORAL at 17:11

## 2020-04-01 RX ADMIN — METOLAZONE 2.5 MG: 2.5 TABLET ORAL at 14:28

## 2020-04-01 ASSESSMENT — PAIN DESCRIPTION - DESCRIPTORS: DESCRIPTORS: ACHING

## 2020-04-01 ASSESSMENT — PAIN SCALES - GENERAL
PAINLEVEL_OUTOF10: 0
PAINLEVEL_OUTOF10: 6
PAINLEVEL_OUTOF10: 6
PAINLEVEL_OUTOF10: 0
PAINLEVEL_OUTOF10: 6
PAINLEVEL_OUTOF10: 7
PAINLEVEL_OUTOF10: 0
PAINLEVEL_OUTOF10: 6

## 2020-04-01 ASSESSMENT — PAIN DESCRIPTION - LOCATION: LOCATION: ABDOMEN

## 2020-04-01 ASSESSMENT — PAIN DESCRIPTION - PAIN TYPE: TYPE: ACUTE PAIN

## 2020-04-01 ASSESSMENT — PAIN SCALES - WONG BAKER: WONGBAKER_NUMERICALRESPONSE: 0

## 2020-04-01 ASSESSMENT — PAIN DESCRIPTION - ORIENTATION: ORIENTATION: ANTERIOR

## 2020-04-01 NOTE — PLAN OF CARE
Patient's EF (Ejection Fraction) is less than 40%    Heart Failure Medications:  Diuretics[de-identified] Furosemide    (One of the following REQUIRED for EF <40%/SYSTOLIC FAILURE but MAY be used in EF% >40%/DIASTOLIC FAILURE)        ACE[de-identified] None        ARB[de-identified] None         ARNI[de-identified] None    (Beta Blockers)  NON- Evidenced Based Beta Blocker (for EF% >40%/DIASTOLIC FAILURE): None    Evidenced Based Beta Blocker::(REQUIRED for EF% <40%/SYSTOLIC FAILURE) None  . .................................................................................................................................................. Patient's weights and intake/output reviewed: Yes    Patient's Last Weight: 258 lbs and 3 ounces obtained by standing scale. Difference of 8 ounces less than last documented weight. Intake/Output Summary (Last 24 hours) at 4/1/2020 1130  Last data filed at 4/1/2020 1035  Gross per 24 hour   Intake 1390 ml   Output 2975 ml   Net -1585 ml       Comorbidities Reviewed Yes    Patient has a past medical history of Anxiety and depression, Arthritis, CAD (coronary artery disease), Cardiomyopathy ([address] CHF (congestive heart failure) ([address] Chronic systolic heart failure ([address] COPD (chronic obstructive pulmonary disease) ([address] Diabetes mellitus ([address] GERD (gastroesophageal reflux disease), Hyperlipidemia, Hypertension, Hypotension, MI (myocardial infarction) ([address] Peripheral neuropathy, Peripheral vascular disease ([address] Pulmonary HTN ([address] Reflux, Sleep apnea, and Wears glasses. >>For CHF and Comorbidity documentation on Education Time and Topics, please see Education Tab    Progressive Mobility Assessment:  What is this patient's Current Level of Mobility?: Ambulatory-Up Ad Jasmin  How was this patient Mobilized today?: Edge of Bed and Up in Room                 With Whom? Self                 Level of Difficulty/Assistance: Independent     Pt resting in bed at this time on room air. Pt denies shortness of breath.  Pt with

## 2020-04-01 NOTE — PLAN OF CARE
Problem: Coping:  Goal: Ability to adjust to condition or change in health will improve  Description: Ability to adjust to condition or change in health will improve  Outcome: Ongoing     Problem: Fluid Volume:  Goal: Ability to maintain a balanced intake and output will improve  Description: Ability to maintain a balanced intake and output will improve  4/1/2020 1126 by Francisco Javier North RN  Outcome: Ongoing

## 2020-04-01 NOTE — PROGRESS NOTES
(04/01/20 1031)    dextrose         Lab Data:  CBC:   Recent Labs     03/30/20  0410   WBC 7.8   HGB 9.1*        BMP:    Recent Labs     03/30/20  0410 03/31/20  0422 04/01/20  0439    137 137   K 3.7 3.3* 3.9   CO2 27 28 27   BUN 42* 38* 37*   CREATININE 1.3* 1.4* 1.4*     INR:  No results for input(s): INR in the last 72 hours. BNP:    Recent Labs     03/31/20  0933   PROBNP 2,847*     Lab Results   Component Value Date    LVEF 38 01/24/2020     CABG 1/27/2020:  Urgent CABG X 1, with pedicled LIMA to LAD, SGC, CPB with On pump-beating heart, URI, Epiaortic ultrasound, Doppler verification of grafts, Bilateral 5 level intercostal nerve block(Exparel), Platelet gel application. Sternal plating.  Vas Cath placement.     URI 1/24/2020:  Ejection fraction is visually estimated at 35-40%.   Moderate to severe mitral regurgitation with multiple jets visualized.   ERO estimated at 0.29 cm2.   Mild tricuspid regurgitation.   The left atrial appendage shows evidence of thrombus formation and low flow   velocities.  Moderate amount of plaque in the aorta.     Echo 1/22/2020:   Technically difficult examination secondary to habitus.   The left ventricular systolic function is severely reduced with an ejection   fraction of 25 %.   There is severe hypokinesis of the anterior and apical walls consistent with   infarction within the territory of the left anterior descending artery.   Grade II diastolic dysfunction with elevated left ventricular filling   pressure.   Moderate to severe mitral regurgitation.   Mild tricuspid regurgitation.   is estimated at 53 mmHg consistent with moderate pulmonary hypertension   (Right atrial pressure of 3 mmHg).   If clinically necessary, consider URI for better evaluation of mitral   regurgitation     Coronary angiogram 1/20/2020:  Dominance : Right  LM: 70-80% short distal stenosis  LAD: 70-80% short ostial stenosis, extending into takeoff of high first diagonal; large proximal

## 2020-04-02 VITALS
RESPIRATION RATE: 18 BRPM | OXYGEN SATURATION: 98 % | BODY MASS INDEX: 42.34 KG/M2 | SYSTOLIC BLOOD PRESSURE: 133 MMHG | HEART RATE: 97 BPM | TEMPERATURE: 97.2 F | WEIGHT: 254.1 LBS | DIASTOLIC BLOOD PRESSURE: 86 MMHG | HEIGHT: 65 IN

## 2020-04-02 LAB
ALBUMIN SERPL-MCNC: 3.9 G/DL (ref 3.4–5)
ANION GAP SERPL CALCULATED.3IONS-SCNC: 16 MMOL/L (ref 3–16)
BUN BLDV-MCNC: 39 MG/DL (ref 7–20)
CALCIUM SERPL-MCNC: 9.9 MG/DL (ref 8.3–10.6)
CHLORIDE BLD-SCNC: 94 MMOL/L (ref 99–110)
CO2: 27 MMOL/L (ref 21–32)
CREAT SERPL-MCNC: 1.5 MG/DL (ref 0.6–1.1)
GFR AFRICAN AMERICAN: 43
GFR NON-AFRICAN AMERICAN: 36
GLUCOSE BLD-MCNC: 112 MG/DL (ref 70–99)
GLUCOSE BLD-MCNC: 139 MG/DL (ref 70–99)
GLUCOSE BLD-MCNC: 149 MG/DL (ref 70–99)
HCT VFR BLD CALC: 30.7 % (ref 36–48)
HEMOGLOBIN: 9.7 G/DL (ref 12–16)
MCH RBC QN AUTO: 27.4 PG (ref 26–34)
MCHC RBC AUTO-ENTMCNC: 31.6 G/DL (ref 31–36)
MCV RBC AUTO: 86.6 FL (ref 80–100)
PDW BLD-RTO: 19.4 % (ref 12.4–15.4)
PERFORMED ON: ABNORMAL
PERFORMED ON: ABNORMAL
PHOSPHORUS: 3.2 MG/DL (ref 2.5–4.9)
PLATELET # BLD: 376 K/UL (ref 135–450)
PMV BLD AUTO: 8.5 FL (ref 5–10.5)
POTASSIUM SERPL-SCNC: 3.5 MMOL/L (ref 3.5–5.1)
RBC # BLD: 3.54 M/UL (ref 4–5.2)
SODIUM BLD-SCNC: 137 MMOL/L (ref 136–145)
WBC # BLD: 7 K/UL (ref 4–11)

## 2020-04-02 PROCEDURE — 6360000002 HC RX W HCPCS: Performed by: INTERNAL MEDICINE

## 2020-04-02 PROCEDURE — 94640 AIRWAY INHALATION TREATMENT: CPT

## 2020-04-02 PROCEDURE — 6370000000 HC RX 637 (ALT 250 FOR IP): Performed by: INTERNAL MEDICINE

## 2020-04-02 PROCEDURE — 6370000000 HC RX 637 (ALT 250 FOR IP): Performed by: NURSE PRACTITIONER

## 2020-04-02 PROCEDURE — 2580000003 HC RX 258: Performed by: NURSE PRACTITIONER

## 2020-04-02 PROCEDURE — 99233 SBSQ HOSP IP/OBS HIGH 50: CPT | Performed by: NURSE PRACTITIONER

## 2020-04-02 PROCEDURE — 85027 COMPLETE CBC AUTOMATED: CPT

## 2020-04-02 PROCEDURE — 2580000003 HC RX 258: Performed by: INTERNAL MEDICINE

## 2020-04-02 PROCEDURE — 80069 RENAL FUNCTION PANEL: CPT

## 2020-04-02 PROCEDURE — 6360000002 HC RX W HCPCS: Performed by: NURSE PRACTITIONER

## 2020-04-02 RX ORDER — SPIRONOLACTONE 25 MG/1
25 TABLET ORAL DAILY
Qty: 30 TABLET | Refills: 3 | Status: ON HOLD | OUTPATIENT
Start: 2020-04-03 | End: 2020-08-12 | Stop reason: HOSPADM

## 2020-04-02 RX ORDER — TORSEMIDE 100 MG/1
100 TABLET ORAL DAILY
Status: DISCONTINUED | OUTPATIENT
Start: 2020-04-02 | End: 2020-04-02 | Stop reason: HOSPADM

## 2020-04-02 RX ORDER — METOLAZONE 2.5 MG/1
2.5 TABLET ORAL DAILY PRN
Qty: 30 TABLET | Refills: 3 | Status: SHIPPED | OUTPATIENT
Start: 2020-04-02 | End: 2020-04-15

## 2020-04-02 RX ORDER — SPIRONOLACTONE 25 MG/1
25 TABLET ORAL DAILY
Status: DISCONTINUED | OUTPATIENT
Start: 2020-04-02 | End: 2020-04-02 | Stop reason: HOSPADM

## 2020-04-02 RX ADMIN — SPIRONOLACTONE 25 MG: 25 TABLET ORAL at 10:15

## 2020-04-02 RX ADMIN — BUSPIRONE HYDROCHLORIDE 30 MG: 5 TABLET ORAL at 09:02

## 2020-04-02 RX ADMIN — TORSEMIDE 100 MG: 100 TABLET ORAL at 10:15

## 2020-04-02 RX ADMIN — POTASSIUM CHLORIDE 40 MEQ: 1500 TABLET, EXTENDED RELEASE ORAL at 09:03

## 2020-04-02 RX ADMIN — FUROSEMIDE 15 MG/HR: 10 INJECTION, SOLUTION INTRAMUSCULAR; INTRAVENOUS at 04:17

## 2020-04-02 RX ADMIN — ASPIRIN 81 MG: 81 TABLET, COATED ORAL at 09:02

## 2020-04-02 RX ADMIN — INSULIN LISPRO 2 UNITS: 100 INJECTION, SOLUTION INTRAVENOUS; SUBCUTANEOUS at 09:13

## 2020-04-02 RX ADMIN — HEPARIN SODIUM 5000 UNITS: 5000 INJECTION INTRAVENOUS; SUBCUTANEOUS at 05:53

## 2020-04-02 RX ADMIN — Medication 10 ML: at 09:10

## 2020-04-02 RX ADMIN — MICONAZOLE NITRATE: 20 POWDER TOPICAL at 10:15

## 2020-04-02 RX ADMIN — TIOTROPIUM BROMIDE INHALATION SPRAY 2 PUFF: 3.12 SPRAY, METERED RESPIRATORY (INHALATION) at 07:35

## 2020-04-02 RX ADMIN — INSULIN LISPRO 8 UNITS: 100 INJECTION, SOLUTION INTRAVENOUS; SUBCUTANEOUS at 09:13

## 2020-04-02 RX ADMIN — MIDODRINE HYDROCHLORIDE 2.5 MG: 5 TABLET ORAL at 09:03

## 2020-04-02 RX ADMIN — CLOPIDOGREL BISULFATE 75 MG: 75 TABLET ORAL at 09:03

## 2020-04-02 RX ADMIN — LAMOTRIGINE 150 MG: 100 TABLET ORAL at 09:02

## 2020-04-02 RX ADMIN — PANTOPRAZOLE SODIUM 40 MG: 40 TABLET, DELAYED RELEASE ORAL at 09:02

## 2020-04-02 ASSESSMENT — PAIN SCALES - GENERAL: PAINLEVEL_OUTOF10: 0

## 2020-04-02 ASSESSMENT — PAIN SCALES - WONG BAKER: WONGBAKER_NUMERICALRESPONSE: 0

## 2020-04-02 NOTE — PROGRESS NOTES
[facility]   Daily Progress Note    Admit Date:  3/20/2020  HPI:  No chief complaint on file. Interval history: admitted 3/20/2020 for hypotension, weight gain and edema. Also treated for A/CKD. Subjective:  Ms. Yudy Grant weight is down. Edema is improving     Objective:   /86   Pulse 97   Temp 97.2 °F (36.2 °C) (Oral)   Resp 18   Ht 5' 4.5\" (1.638 m)   Wt 254 lb 1.6 oz (115.3 kg)   LMP 10/24/2012   SpO2 98%   BMI 42.94 kg/m²       Intake/Output Summary (Last 24 hours) at 4/2/2020 0847  Last data filed at 4/2/2020 0868  Gross per 24 hour   Intake 1518 ml   Output 5000 ml   Net -3482 ml       NYHA: IV    Physical Exam:  General:  Awake, alert, NAD, obese  Skin:  Warm and dry  Neck:  JVD difficult to assess  Chest:  Dim, no wheezes on the right, no m/r/g  Cardiovascular:  Reg  rate, reg rhythm S1S2, no m/r/g, dressing in place on anterior chest   Abdomen:  Soft, nontender, +bowel sounds  Extremities:  + BLE edema. , less flank edema R>L skin not as tight.      Medications:    midodrine  2.5 mg Oral Daily    potassium chloride  40 mEq Oral TID WC    miconazole   Topical BID    polyethylene glycol  17 g Oral Daily    ARIPiprazole  10 mg Oral Daily    aspirin EC  81 mg Oral Daily    atorvastatin  80 mg Oral Nightly    benztropine  1 mg Oral Nightly    buprenorphine-naloxone  1 Film Sublingual BID    busPIRone  30 mg Oral BID    clopidogrel  75 mg Oral Daily    lamoTRIgine  150 mg Oral BID    pantoprazole  40 mg Oral BID    tiotropium  2 puff Inhalation Daily    sodium chloride flush  10 mL Intravenous 2 times per day    insulin lispro  8 Units Subcutaneous TID WC    insulin lispro  0-12 Units Subcutaneous TID WC    insulin lispro  0-6 Units Subcutaneous Nightly    heparin (porcine)  5,000 Units Subcutaneous 3 times per day      furosemide (LASIX) 1mg/ml infusion 15 mg/hr (04/02/20 4370)    dextrose         Lab Data:  CBC:   Recent Labs     04/02/20  2576   WBC 7.0 HGB 9.7*        BMP:    Recent Labs     03/31/20  0422 04/01/20  0439 04/02/20  0446    137 137   K 3.3* 3.9 3.5   CO2 28 27 27   BUN 38* 37* 39*   CREATININE 1.4* 1.4* 1.5*     INR:  No results for input(s): INR in the last 72 hours. BNP:    Recent Labs     03/31/20  0933   PROBNP 2,847*     Lab Results   Component Value Date    LVEF 38 01/24/2020     CABG 1/27/2020:  Urgent CABG X 1, with pedicled LIMA to LAD, SGC, CPB with On pump-beating heart, URI, Epiaortic ultrasound, Doppler verification of grafts, Bilateral 5 level intercostal nerve block(Exparel), Platelet gel application. Sternal plating.  Vas Cath placement.     URI 1/24/2020:  Ejection fraction is visually estimated at 35-40%.   Moderate to severe mitral regurgitation with multiple jets visualized.   ERO estimated at 0.29 cm2.   Mild tricuspid regurgitation.   The left atrial appendage shows evidence of thrombus formation and low flow   velocities.  Moderate amount of plaque in the aorta.     Echo 1/22/2020:   Technically difficult examination secondary to habitus.   The left ventricular systolic function is severely reduced with an ejection   fraction of 25 %.   There is severe hypokinesis of the anterior and apical walls consistent with   infarction within the territory of the left anterior descending artery.   Grade II diastolic dysfunction with elevated left ventricular filling   pressure.   Moderate to severe mitral regurgitation.   Mild tricuspid regurgitation.   is estimated at 53 mmHg consistent with moderate pulmonary hypertension   (Right atrial pressure of 3 mmHg).   If clinically necessary, consider URI for better evaluation of mitral   regurgitation     Coronary angiogram 1/20/2020:  Dominance : Right  LM: 70-80% short distal stenosis  LAD: 70-80% short ostial stenosis, extending into takeoff of high first diagonal; large proximal to mid stented segment patent with jailed second diagonal  (80% ostial D2) and 70% in stent

## 2020-04-02 NOTE — FLOWSHEET NOTE
Musculoskeletal (WDL) WDL   Genitourinary   Genitourinary (WDL) WDL   Wound 01/17/20 Toe (Comment  which one) Anterior; Left Closed brown plantar 3rd toe   Date First Assessed/Time First Assessed: 01/17/20 1745   Present on Hospital Admission: Yes  Primary Wound Type: Diabetic Ulcer  Location: (c) Toe (Comment  which one)  Wound Location Orientation: Anterior; Left  Wound Description (Comments): Closed br. .. Wound Diabetic   Dressing Status Clean;Dry; Intact   Wound 01/20/20 Toe (Comment  which one) Anterior; Left 1st dorsal toe lateral edge of nail, brown & dry   Date First Assessed/Time First Assessed: 01/20/20 0958   Present on Hospital Admission: Yes  Primary Wound Type: Diabetic Ulcer  Location: Toe (Comment  which one)  Wound Location Orientation: Anterior; Left  Wound Description (Comments): 1st dorsal to. .. Wound Diabetic   Dressing Status Clean;Dry; Intact   Wound 03/10/20 Toe (Comment  which one) Anterior; Left Unstageable   Date First Assessed/Time First Assessed: 03/10/20 1230   Present on Hospital Admission: Yes  Primary Wound Type: Diabetic Ulcer  Location: (c) Toe (Comment  which one)  Wound Location Orientation: Anterior; Left  Wound Description (Comments): Unstageable   Wound Pressure Unstageable   Dressing Status Clean;Dry; Intact   Wound 03/27/20 Foot Right;Plantar   Date First Assessed/Time First Assessed: 03/27/20 1520   Present on Hospital Admission: Yes  Primary Wound Type: Diabetic Ulcer  Location: Foot  Wound Location Orientation: Right;Plantar   Wound Diabetic   Dressing Status Clean;Dry; Intact   Wound Assessment Dry; Intact   Drainage Amount None   Incision 03/10/20 Sternum   Date First Assessed/Time First Assessed: 03/10/20 1229   Present on Hospital Admission: Yes  Wound Approximate Age at First Assessment (Weeks): 6 weeks  Primary Wound Type: (c) Incision  Location: Sternum   Wound Assessment Clean;Dry; Intact   Psychosocial   Psychosocial (WDL) WDL

## 2020-04-02 NOTE — DISCHARGE SUMMARY
Hospital Medicine Discharge Summary    Patient ID: Meghana Guido      Patient's PCP: Walda Apley, PA-C    Admit Date: 3/20/2020     Discharge Date:   04/02/20     Admitting Physician: Oneyda Mcgowan MD     Discharge Physician: Major Blankenship MD     Discharge Diagnoses: Active Hospital Problems    Diagnosis    Hypotension [I95.9]    Pulmonary hypertension ([facility] [address] [I27.20]    CHF (congestive heart failure), NYHA class I, acute on chronic, combined (HCC) [I50.43]    Acute kidney injury superimposed on chronic kidney disease ([address] [N17.9, N18.9]       The patient was seen and examined on day of discharge and this discharge summary is in conjunction with any daily progress note from day of discharge. Hospital Course:  \"54 y.o. female who presented to [facility] with hypotension and weight gain. Patient was just discharged last week for SHAUNNA and CHF exacerbation. Since her discharge, she has had persistent weight gain and increasing LE edema. She has also had persistent BP in the 26O-44E systolic. This is common for her and she has never had symptoms associated with BP in the this range before. She has been in contact with her cardiology team since her discharge and increased her home diuretic but it had no effect on her weight gain. Over the past couple days, she has had increased HIGHTOWER as well which is typical for her when she gets volume overloaded. \"      Acute on chronic systolic CHF, EF 61%  - furosemide gtt, down to 254 lbs on discharge, which is still 9 lbs heavier than recent dry weight but she is very eager to discharge and stable for outpatient management at this point.   - discharged with torsemide, spironolatone, and PRN metolazone  - unable to tolerate BB, ACEi/ARB due to hypotension. In fact she required midodrine for a number of days. SHAUNNA on CKD3  - appreciate nephrology input. Cr improved with diuresis.     COPD  - inhaled bronchodilators    HLD  - statin    Opioid EC 81 MG EC tablet Take 1 tablet by mouth daily  Qty: 30 tablet, Refills: 3      ARIPiprazole (ABILIFY) 10 MG tablet Take 10 mg by mouth daily       lamoTRIgine (LAMICTAL) 150 MG tablet Take 150 mg by mouth 2 times daily                Time Spent on discharge is more than 30 minutes in the examination, evaluation, counseling and review of medications and discharge plan. Signed:    Marcio Vines MD   4/2/2020      Thank you Natalya Garzon PA-C for the opportunity to be involved in this patient's care. If you have any questions or concerns please feel free to contact me at [phone].

## 2020-04-02 NOTE — PROGRESS NOTES
[facility]       Progress Note           Subjective/   62y.o. year old female who we are seeing in consultation for A/CKD. HPI:  Renal function stable with lasix drip, prn metolazone, urine output of 5.4 liters over last 24 hours. Still with sob with exertion, improving. Weights trending down, still up from baseline ~245 pounds. ROS:  Intake adequate. Objective/   GEN:  Chronically ill, /86   Pulse 97   Temp 97.2 °F (36.2 °C) (Oral)   Resp 18   Ht 5' 4.5\" (1.638 m)   Wt 254 lb 1.6 oz (115.3 kg)   LMP 10/24/2012   SpO2 98%   BMI 42.94 kg/m²   HEENT: non-icteric, no JVD  CV: S1, S2 without m/r/g; ++ LE edema in wraps  RESP: CTA B without w/r/r; breathing wnl  ABD: +bs, soft, distended, nt, no hsm  SKIN: warm, no rashes    Data/  Recent Labs     04/02/20  0446   WBC 7.0   HGB 9.7*   HCT 30.7*   MCV 86.6        Recent Labs     03/31/20  0422 04/01/20  0439 04/02/20  0446    137 137   K 3.3* 3.9 3.5   CL 94* 95* 94*   CO2 28 27 27   GLUCOSE 131* 136* 139*   PHOS 3.2 3.0 3.2   MG 1.80 1.90  --    BUN 38* 37* 39*   CREATININE 1.4* 1.4* 1.5*   LABGLOM 39* 39* 36*   GFRAA 47* 47* 43*       Assessment/Plan     - A/CKD: The patient has chronic kidney disease with a baseline creatinine of 1.5 to 1.7. Her SHAUNNA is likely secondary to a non oliguric ATN in the setting of hypotension and renal hypoperfusion. Transition to torsemide 100 mg a day, aldactone 25 mg a day, & prn metolazone. PAD target 25, currently at 29 on Cardiomems.     - Edema: Continue with ace wraps as tolerated. Continue fluid restriction 1.5 liters/day. - Hyponatremia: Better with diuresis & free water restriction.    - Hyperkalemia: Resolved s/p medical therapy. Now requiring prn replacement with diuresis. Started on KCL 40 meq po tid. - Hypotension: Blood pressure improving with scheduled midodrine 5 mg po tid. - Anemia: Stable Hb ~9 range, monitor.       Okay for discharge

## 2020-04-03 ENCOUNTER — FOLLOWUP TELEPHONE ENCOUNTER (OUTPATIENT)
Dept: TELEMETRY | Age: 55
End: 2020-04-03

## 2020-04-03 ENCOUNTER — CARE COORDINATION (OUTPATIENT)
Dept: CASE MANAGEMENT | Age: 55
End: 2020-04-03

## 2020-04-03 NOTE — CARE COORDINATION
[facility] Transitions Initial Follow Up Call    Call within 2 business days of discharge: Yes    Patient: Simba Tabares Long Patient : 1965   MRN: <[id number]>  Reason for Admission: CHF, SHAUNNA, Hypotension   Discharge Date: 20 RARS: Readmission Risk Score: 72      Last Discharge [facility]       Complaint Diagnosis Description Type Department Provider    3/20/20  Acute on chronic systolic heart failure ([facility] [address] . .. Admission (Discharged)  Stephen Snow MD    3/20/20 Hypotension SHAUNNA (acute kidney injury) ([facility] [address] . .. ED (TRANSFER) [facility] Heena Mccrary MD           Spoke with: Sravani Goldberg: Amalia Echeverria     Patient contacted regarding recent discharge and COVID-19 risk   Care Transition Nurse/ Ambulatory Care Manager contacted the patient by telephone to perform post discharge assessment. Verified name and  with patient as identifiers. Patient has following risk factors of: heart failure and COPD. CTN/ACM reviewed discharge instructions, medical action plan and red flags related to discharge diagnosis. Reviewed and educated them on any new and changed medications related to discharge diagnosis. Advised obtaining a 90-day supply of all daily and as-needed medications. Education provided regarding infection prevention, and signs and symptoms of COVID-19 and when to seek medical attention with patient who verbalized understanding. Discussed exposure protocols and quarantine from [address] you at higher risk for severe illness  and given an opportunity for questions and concerns. The patient agrees to contact the COVID-19 hotline [phone] or PCP office for questions related to their healthcare. CTN/ACM provided contact information for future reference. From CDC: Are you at higher risk for severe illness?  Wash your hands often.  Avoid close contact (6 feet, which is about two arm lengths) with people who are sick.    Put distance between yourself and other people if COVID-19 is spreading in your community.  Clean and disinfect frequently touched surfaces.  Avoid all cruise travel and non-essential air travel.  Call your healthcare professional if you have concerns about COVID-19 and your underlying condition or if you are sick. For more information on steps you can take to protect yourself, see CDC's How to Protect Yourself    SUMMARY: Spoke with Angélica Ma, introduced self/role. Patient reports feeling \"good\". Reports /68 this morning. Denies weight gain, increased edema or SOB. Reviewed CHF Zone tool, stressed importance of early symptom recognition and reporting. Reviewed medications, denies further questions/concerns. Patient understands when to take metolazone. Reminded patient to call CHF Clinic, PCP, and Cardiology for virtual follow up, verbalized understanding. Denies further needs/assistance/concerns/questions at this time.        Care Transitions 24 Hour Call    Care Transitions Interventions         Follow Up  Future Appointments   Date Time Provider Dionicio Fleming   4/7/2020  9:30 AM [facility]   4/15/2020  2:30 PM TRAY Mendoza CNP [facility]   4/23/2020 11:00 AM TRAY Camacho CNP [facility]   6/2/2020  1:40 PM TRAY Beyer CNP [facility]   8/24/2020 10:00 AM MMO CT RM 1 [facility] Rad   8/27/2020  2:15 PM MD ANA MARIA Coronel RN

## 2020-04-07 ENCOUNTER — VIRTUAL VISIT (OUTPATIENT)
Dept: CARDIOTHORACIC SURGERY | Age: 55
End: 2020-04-07

## 2020-04-07 ENCOUNTER — HOSPITAL ENCOUNTER (OUTPATIENT)
Age: 55
Discharge: HOME | End: 2020-04-07
Payer: COMMERCIAL

## 2020-04-07 ENCOUNTER — TELEPHONE (OUTPATIENT)
Dept: CARDIOLOGY CLINIC | Age: 55
End: 2020-04-07

## 2020-04-07 VITALS
TEMPERATURE: 97.9 F | HEIGHT: 65 IN | BODY MASS INDEX: 41.32 KG/M2 | SYSTOLIC BLOOD PRESSURE: 98 MMHG | HEART RATE: 109 BPM | WEIGHT: 248 LBS | DIASTOLIC BLOOD PRESSURE: 61 MMHG

## 2020-04-07 LAB
ANION GAP SERPL CALCULATED.3IONS-SCNC: 18 MMOL/L (ref 3–16)
BUN BLDV-MCNC: 49 MG/DL (ref 7–20)
CALCIUM SERPL-MCNC: 9.2 MG/DL (ref 8.3–10.6)
CHLORIDE BLD-SCNC: 84 MMOL/L (ref 99–110)
CO2: 31 MMOL/L (ref 21–32)
CREAT SERPL-MCNC: 1.6 MG/DL (ref 0.6–1.1)
GFR AFRICAN AMERICAN: 40
GFR NON-AFRICAN AMERICAN: 33
GLUCOSE BLD-MCNC: 132 MG/DL (ref 70–99)
POTASSIUM SERPL-SCNC: 2.4 MMOL/L (ref 3.5–5.1)
PRO-BNP: 2208 PG/ML (ref 0–124)
SODIUM BLD-SCNC: 133 MMOL/L (ref 136–145)

## 2020-04-07 PROCEDURE — 36415 COLL VENOUS BLD VENIPUNCTURE: CPT

## 2020-04-07 PROCEDURE — 83880 ASSAY OF NATRIURETIC PEPTIDE: CPT

## 2020-04-07 PROCEDURE — 80048 BASIC METABOLIC PNL TOTAL CA: CPT

## 2020-04-07 PROCEDURE — 99024 POSTOP FOLLOW-UP VISIT: CPT | Performed by: THORACIC SURGERY (CARDIOTHORACIC VASCULAR SURGERY)

## 2020-04-07 RX ORDER — MIDODRINE HYDROCHLORIDE 5 MG/1
5 TABLET ORAL 3 TIMES DAILY
Qty: 90 TABLET | Refills: 3 | Status: SHIPPED | OUTPATIENT
Start: 2020-04-07 | End: 2020-06-26

## 2020-04-07 NOTE — PROGRESS NOTES
Progress Note    CC:  Postoperative follow-up    S/P    CABG surgery. Subjective:    She had some sternal wound separation sometime after her discharge. At this point she is healing up very well. She is getting back to normal activities. Her eating and sleeping habits are normalizing. Vital Signs:                                                 BP 98/61 (Site: Left Upper Arm, Position: Sitting, Cuff Size: Medium Adult)   Pulse 109   Temp 97.9 °F (36.6 °C) (Oral)   Ht 5' 4.5\" (1.638 m)   Wt 248 lb (112.5 kg)   LMP 10/24/2012   BMI 41.91 kg/m²        Incisions:    Sternal incision is clean, dry and intact. Sternum is stable. Assessment/Plan:  Overall doing very well post CABG surgery. We discussed secondary risk prevention for cardiovascular disease. I gave the patient a copy of our protocol for the secondary risk prevention of cardiovascular disease. The patient may increase activities as she feels comfortable doing so. Follow-up with her PCP and Dr. Raheem Greene as prescribed. Follow-up with me as needed. Due to the COVID-19 restrictions on close contact interactions, the patient's postoperative visit was conducted via video/telephone in lieu of a face to face visit.     India Brooks MD  4/7/2020  9:07 AM

## 2020-04-07 NOTE — TELEPHONE ENCOUNTER
Critical lab with K of 2.4. Spoke to patient by telephone. Today's weight 248 lbs (was 252 lbs after discharge, dropped to 245 lbs then back to 248 lbs today). BP 91/68 this am.   Currently taking torsemide 100 mg daily, spironolactone 25 mg daily and KCL 40 mEq BID. She has not taken any metolazone. She was not prescribed midodrine at discharge. Reviewed CARDIOMEMS pressures:           Recommendations:   1. Double KCL to 80 mEq bid  2. Continue to hold metolazone  3. Start midodrine 5 mg po TID  4. Continue daily weights and CARDIOMEMS  5. Repeat labs (BMP, BNP) in 1 week  6. Virtual visit scheduled 4/15/20 as planned    Voiced understanding.    TRAY Sweeney - CNP

## 2020-04-08 ENCOUNTER — TELEPHONE (OUTPATIENT)
Dept: CARDIOLOGY CLINIC | Age: 55
End: 2020-04-08

## 2020-04-13 RX ORDER — TORSEMIDE 100 MG/1
TABLET ORAL
Qty: 60 TABLET | Refills: 5 | Status: SHIPPED | OUTPATIENT
Start: 2020-04-13 | End: 2020-04-15

## 2020-04-14 NOTE — DISCHARGE SUMMARY
Hospital Medicine Discharge Summary    Patient: [place]     Age: 62 y.o. Gender: female  : 1965   MRN: [id number]  Code Status: Full     Admit Date: 3/10/2020   Discharge Date: 3/13/2020    Disposition:  Home     Condition at Discharge: Stable    Primary Care Provider: King Elder PA-C    Admitting Physician: Humphrey Dancer, MD  Discharge Physician: Humphrey Dancer, MD       Discharge Diagnoses: Active Hospital Problems    Diagnosis    COPD [facility] obstructive pulmonary disease) [facility] [J44.9]    CKD (chronic kidney disease) stage 3, GFR 30-59 ml/min ([facility]) [N18.3]    SHAUNNA (acute kidney injury) ([facility] [address] [N17.9]    Diabetes mellitus type 2 in obese ([facility] [E11.69, E66.9]    Severe claudication ([facility] [address] [I73.9]    Essential hypertension [I10]       Hospital Course:   62 y.o. female who presented to [facility] with SHAUNNA. PMHx significant for CAD status post CABG, CHF, COPD, diabetes, hypertension, CKD and PAD. She initially presented to the hospital for an elective left lower extremity angiogram with Dr. hCavez Mendez. Preprocedure lab studies showed worsening acute kidney injury with creatinine of 3.4 with a baseline of 1.8 last week. The angiogram was canceled and she was directly admitted for further management of her acute kidney injury. Nephrology was consulted. Of note the patient follows with cardiology and has recently been treated with increasing doses of diuretics for congestive heart failure symptoms. She is received an increased dose of torsemide 100 mg twice daily in addition to intermittent dosing of IV Lasix through home health care at home. Last week she was seen at the cardiologist office and her torsemide was reduced back to 100 mg daily. Patient denies any nausea, vomiting, diarrhea or decreased p.o. intake.       Assessment/Plan:    SHAUNNA (acute kidney injury)/CKD Stage 3: Could be related to recent increases in diuretic therapy and/or congestive heart failure with Peripheral Pulses: +2 palpable, equal bilaterally     Patient Discharge Instructions: Follow up:  1. Primary Care Provider Dr. Charlotte Redding PA-C in the next 1-2 weeks. Discharge Medications:   Discharge Medication List as of 3/13/2020  5:15 PM      START taking these medications    Details   amoxicillin-clavulanate (AUGMENTIN) 875-125 MG per tablet Take 1 tablet by mouth every 12 hours for 6 days, Disp-12 tablet, R-0Normal      lactobacillus (CULTURELLE) capsule Take 1 capsule by mouth Daily with supper for 7 days, Disp-7 capsule, R-0Normal      cyclobenzaprine (FLEXERIL) 10 MG tablet Take 1 tablet by mouth 3 times daily as needed for Muscle spasms, Disp-21 tablet, R-0Normal           Discharge Medication List as of 3/13/2020  5:15 PM      CONTINUE these medications which have CHANGED    Details   Insulin Degludec (TRESIBA FLEXTOUCH) 100 UNIT/ML SOPN Inject 30 Units into the skin nightly, Disp-10 pen, R-5Adjust Sig           Discharge Medication List as of 3/13/2020  5:15 PM      CONTINUE these medications which have NOT CHANGED    Details   torsemide (DEMADEX) 100 MG tablet Take 1 tablet by mouth daily, Disp-30 tablet, R-1NO PRINT      atorvastatin (LIPITOR) 80 MG tablet Take 1 tablet by mouth nightly, Disp-90 tablet, R-3Normal      tiotropium (SPIRIVA RESPIMAT) 2.5 MCG/ACT AERS inhaler INHALE 2 PUFFS INTO THE LUNGS DAILY, Disp-1 Inhaler, R-6Normal      potassium chloride (KLOR-CON M) 20 MEQ extended release tablet Take 40 mEq by mouth 4 times daily Historical Med      buprenorphine-naloxone (SUBOXONE) 8-2 MG FILM SL film Place 1 Film under the tongue 2 times daily. Historical Med      albuterol sulfate  (90 Base) MCG/ACT inhaler Inhale 2 puffs into the lungs every 6 hours as needed for Wheezing or Shortness of BreathHistorical Med      benztropine (COGENTIN) 1 MG tablet Take 1 mg by mouth nightly Historical Med      blood glucose test strips (EXACTECH TEST) strip Disp-200 strip, R-6, Normal6 examination. Soft Tissues/Bones: No suspicious lytic or blastic osseous lesion. Post median sternotomy changes, minor induration and small amount of gas or air along the sternotomy wound. No fluid collection to indicate abscess. Cardiomegaly with small left pleural effusion and left basilar atelectasis or developing infection. No pulmonary edema. Consults:     IP CONSULT TO NEPHROLOGY    Labs: For convenience and continuity at follow-up the following most recent labs are provided:    Lab Results   Component Value Date    WBC 7.0 04/02/2020    HGB 9.7 04/02/2020    HCT 30.7 04/02/2020    MCV 86.6 04/02/2020     04/02/2020     04/07/2020    K 2.4 04/07/2020    K 3.6 03/25/2020    CL 84 04/07/2020    CO2 31 04/07/2020    BUN 49 04/07/2020    CREATININE 1.6 04/07/2020    CALCIUM 9.2 04/07/2020    PHOS 3.2 04/02/2020    BNP 2,275 10/15/2018    TROPONINI 0.04 03/20/2020    ALKPHOS 147 03/20/2020    ALT 11 03/20/2020    AST 20 03/20/2020    BILITOT 0.4 03/20/2020    BILIDIR <0.2 01/27/2020    LABALBU 3.9 04/02/2020    LDLCALC 21 01/27/2020    TRIG 78 01/27/2020    LABA1C 5.8 03/20/2020     Lab Results   Component Value Date    INR 1.09 03/10/2020    INR 1.44 (H) 01/28/2020    INR 1.65 (H) 01/27/2020         The patient was seen and examined on day of discharge and this discharge summary is in conjunction with any daily progress note from day of discharge. Time spent on discharge is more than 30 minutes in the examination, evaluation, counseling and review of medications and discharge plan. Signed:    Patria Young MD   4/14/2020    Thank you Rolando Barbosa PA-C for the opportunity to be involved in this patient's care. If you have any questions or concerns please feel free to contact my office [phone].

## 2020-04-14 NOTE — PROGRESS NOTES
4/15/2020    TELEHEALTH EVALUATION -- Audio/Visual (During [id number] public health emergency)    Meghan Morris (:  1965) has requested an audio/video evaluation for the following concern(s):  Chief Complaint   Patient presents with    Follow-Up from Hospital    Anxiety        HPI: Meghan Morris was recently readmitted with hypotension and weight gain, treated for anasarca, A/ CKD, hypotension. She was diuresed ~ 20 lbs with Lasix infusion. She was also started on midodrine for hypotension which aided her renal perfusion and diuresis. Her PAD by CardioMEMs dropped from 34 to 29 gradually (target = 25). She was discharged on torsemide 100 mg daily, spironolactone 25 mg daily and midodrine prn. Blood work on 20 with critical K of 2.4, doubled her KCL to 80 meq bid. It was also noted at that time that she was not discharged on midodrine was was prescribed the lower recommended dose of 5 mg tid. Her PAD at that time was 28 with a weight of 248 lbs. Her weight is down 237 lbs. Down 11 lbs in past 1 week. She feels queasy today. Otherwise eating okay and blood sugars okay. Still taking KCL 80 mEq bid. Swelling is better though still tight. Abdomen is softer. Sleeping is poor, up all night doing household tasks with daughter. Still sleeps in recliner. No cough. Sores on feet getting much better. Stays cold since started Plavix. Started midodrine last week, BP 98 systolic today but was 177 systolic yesterday. No lightheadedness  No metolazone since discharge. Her CardioMEMs pressures have been dampened over the last 5 days. Meghan Morris describes symptoms including dyspnea, fatigue, edema but denies chest pain, palpitations, orthopnea, PND, early saiety, syncope. Review of Systems   Constitutional: Positive for appetite change, chills and fatigue. Negative for activity change and fever. Respiratory: Positive for shortness of breath. Negative for cough.

## 2020-04-15 ENCOUNTER — HOSPITAL ENCOUNTER (OUTPATIENT)
Age: 55
Discharge: HOME | End: 2020-04-15
Payer: COMMERCIAL

## 2020-04-15 ENCOUNTER — VIRTUAL VISIT (OUTPATIENT)
Dept: CARDIOLOGY CLINIC | Age: 55
End: 2020-04-15
Payer: COMMERCIAL

## 2020-04-15 ENCOUNTER — CARE COORDINATION (OUTPATIENT)
Dept: CASE MANAGEMENT | Age: 55
End: 2020-04-15

## 2020-04-15 VITALS
HEART RATE: 81 BPM | WEIGHT: 237 LBS | HEIGHT: 65 IN | DIASTOLIC BLOOD PRESSURE: 65 MMHG | BODY MASS INDEX: 39.49 KG/M2 | SYSTOLIC BLOOD PRESSURE: 98 MMHG

## 2020-04-15 LAB
ANION GAP SERPL CALCULATED.3IONS-SCNC: 17 MMOL/L (ref 3–16)
BASOPHILS ABSOLUTE: 0.1 K/UL (ref 0–0.2)
BASOPHILS RELATIVE PERCENT: 0.8 %
BUN BLDV-MCNC: 42 MG/DL (ref 7–20)
CALCIUM SERPL-MCNC: 9.7 MG/DL (ref 8.3–10.6)
CHLORIDE BLD-SCNC: 90 MMOL/L (ref 99–110)
CO2: 30 MMOL/L (ref 21–32)
CREAT SERPL-MCNC: 1.2 MG/DL (ref 0.6–1.1)
EOSINOPHILS ABSOLUTE: 0.1 K/UL (ref 0–0.6)
EOSINOPHILS RELATIVE PERCENT: 1.8 %
GFR AFRICAN AMERICAN: 56
GFR NON-AFRICAN AMERICAN: 46
GLUCOSE BLD-MCNC: 223 MG/DL (ref 70–99)
HCT VFR BLD CALC: 34.4 % (ref 36–48)
HEMOGLOBIN: 11 G/DL (ref 12–16)
LYMPHOCYTES ABSOLUTE: 0.8 K/UL (ref 1–5.1)
LYMPHOCYTES RELATIVE PERCENT: 11.1 %
MCH RBC QN AUTO: 26.5 PG (ref 26–34)
MCHC RBC AUTO-ENTMCNC: 32 G/DL (ref 31–36)
MCV RBC AUTO: 83 FL (ref 80–100)
MONOCYTES ABSOLUTE: 0.5 K/UL (ref 0–1.3)
MONOCYTES RELATIVE PERCENT: 7.2 %
NEUTROPHILS ABSOLUTE: 5.7 K/UL (ref 1.7–7.7)
NEUTROPHILS RELATIVE PERCENT: 79.1 %
PDW BLD-RTO: 18.9 % (ref 12.4–15.4)
PLATELET # BLD: 403 K/UL (ref 135–450)
PMV BLD AUTO: 8.1 FL (ref 5–10.5)
POTASSIUM SERPL-SCNC: 3.2 MMOL/L (ref 3.5–5.1)
RBC # BLD: 4.15 M/UL (ref 4–5.2)
SODIUM BLD-SCNC: 137 MMOL/L (ref 136–145)
WBC # BLD: 7.1 K/UL (ref 4–11)

## 2020-04-15 PROCEDURE — 99214 OFFICE O/P EST MOD 30 MIN: CPT | Performed by: NURSE PRACTITIONER

## 2020-04-15 PROCEDURE — 1111F DSCHRG MED/CURRENT MED MERGE: CPT | Performed by: NURSE PRACTITIONER

## 2020-04-15 PROCEDURE — 2022F DILAT RTA XM EVC RTNOPTHY: CPT | Performed by: NURSE PRACTITIONER

## 2020-04-15 PROCEDURE — G8427 DOCREV CUR MEDS BY ELIG CLIN: HCPCS | Performed by: NURSE PRACTITIONER

## 2020-04-15 PROCEDURE — 80048 BASIC METABOLIC PNL TOTAL CA: CPT

## 2020-04-15 PROCEDURE — 83880 ASSAY OF NATRIURETIC PEPTIDE: CPT

## 2020-04-15 PROCEDURE — 3044F HG A1C LEVEL LT 7.0%: CPT | Performed by: NURSE PRACTITIONER

## 2020-04-15 PROCEDURE — 3017F COLORECTAL CA SCREEN DOC REV: CPT | Performed by: NURSE PRACTITIONER

## 2020-04-15 PROCEDURE — 36415 COLL VENOUS BLD VENIPUNCTURE: CPT

## 2020-04-15 PROCEDURE — 85025 COMPLETE CBC W/AUTO DIFF WBC: CPT

## 2020-04-15 RX ORDER — TORSEMIDE 100 MG/1
TABLET ORAL
Qty: 60 TABLET | Refills: 5 | Status: ON HOLD
Start: 2020-04-15 | End: 2020-06-07 | Stop reason: SDUPTHER

## 2020-04-15 RX ORDER — POTASSIUM CHLORIDE 20 MEQ/1
40 TABLET, EXTENDED RELEASE ORAL 4 TIMES DAILY
Qty: 240 TABLET | Refills: 3 | Status: ON HOLD | OUTPATIENT
Start: 2020-04-15 | End: 2020-08-12 | Stop reason: SDUPTHER

## 2020-04-15 ASSESSMENT — ENCOUNTER SYMPTOMS
ABDOMINAL DISTENTION: 1
COUGH: 0
SHORTNESS OF BREATH: 1
NAUSEA: 1

## 2020-04-15 NOTE — CARE COORDINATION
COVID-19 Monitoring:    Attempted to reach patient via phone for care transition. VM left stating purpose of call along with my contact information requesting a return call.         Kaye Domingo BSN, RN, [facility]  Care Transition Nurse   [phone]

## 2020-04-16 ENCOUNTER — TELEPHONE (OUTPATIENT)
Dept: CARDIOLOGY CLINIC | Age: 55
End: 2020-04-16

## 2020-04-16 LAB — PRO-BNP: 2146 PG/ML (ref 0–124)

## 2020-04-16 NOTE — TELEPHONE ENCOUNTER
Reviewed lab results with patient. Renal function stable, potassium better but still low. Recommended to stay the course with torsemide 100 mg daily X 5 days and 50 mg X2 days per week (Monday and Thursday's). Continue same dose of potassium. Repeat labs in 7-10 days. Call with update on swelling and weight in 7-10 days. Voiced understanding.       Hair Zabala, APRN - CNP

## 2020-04-18 ENCOUNTER — PROCEDURE VISIT (OUTPATIENT)
Dept: CARDIOLOGY CLINIC | Age: 55
End: 2020-04-18
Payer: COMMERCIAL

## 2020-04-18 PROCEDURE — 93264 REM MNTR WRLS P-ART PRS SNR: CPT | Performed by: NURSE PRACTITIONER

## 2020-04-23 ENCOUNTER — TELEPHONE (OUTPATIENT)
Dept: CARDIOLOGY CLINIC | Age: 55
End: 2020-04-23

## 2020-04-23 NOTE — TELEPHONE ENCOUNTER
Pt called and stated that over the last 4-5 days has gained a total of 19 pounds, 237 to 256. Pt is getting short of breath. Swelling in legs. Pt took an extra torsemide, but didn't help.

## 2020-04-27 ENCOUNTER — HOSPITAL ENCOUNTER (INPATIENT)
Age: 55
LOS: 3 days | Discharge: HOME | DRG: 182 | End: 2020-04-30
Attending: INTERNAL MEDICINE | Admitting: INTERNAL MEDICINE
Payer: COMMERCIAL

## 2020-04-27 ENCOUNTER — APPOINTMENT (OUTPATIENT)
Dept: GENERAL RADIOLOGY | Age: 55
End: 2020-04-27
Payer: COMMERCIAL

## 2020-04-27 ENCOUNTER — HOSPITAL ENCOUNTER (EMERGENCY)
Age: 55
Discharge: ANOTHER HOSPITAL | End: 2020-04-27
Attending: EMERGENCY MEDICINE
Payer: COMMERCIAL

## 2020-04-27 VITALS
BODY MASS INDEX: 41.32 KG/M2 | RESPIRATION RATE: 16 BRPM | HEART RATE: 91 BPM | SYSTOLIC BLOOD PRESSURE: 104 MMHG | TEMPERATURE: 97.7 F | WEIGHT: 242 LBS | HEIGHT: 64 IN | DIASTOLIC BLOOD PRESSURE: 70 MMHG | OXYGEN SATURATION: 97 %

## 2020-04-27 PROBLEM — M79.672 LEFT FOOT PAIN: Status: ACTIVE | Noted: 2020-04-27

## 2020-04-27 LAB
ANION GAP SERPL CALCULATED.3IONS-SCNC: 15 MMOL/L (ref 3–16)
BASOPHILS ABSOLUTE: 0.1 K/UL (ref 0–0.2)
BASOPHILS RELATIVE PERCENT: 0.6 %
BUN BLDV-MCNC: 46 MG/DL (ref 7–20)
C-REACTIVE PROTEIN: 66.7 MG/L (ref 0–5.1)
CALCIUM SERPL-MCNC: 9.7 MG/DL (ref 8.3–10.6)
CHLORIDE BLD-SCNC: 85 MMOL/L (ref 99–110)
CO2: 36 MMOL/L (ref 21–32)
CREAT SERPL-MCNC: 1.7 MG/DL (ref 0.6–1.1)
CRYSTALS, FLUID: NORMAL
EOSINOPHILS ABSOLUTE: 0.2 K/UL (ref 0–0.6)
EOSINOPHILS RELATIVE PERCENT: 1.5 %
GFR AFRICAN AMERICAN: 38
GFR NON-AFRICAN AMERICAN: 31
GLUCOSE BLD-MCNC: 132 MG/DL (ref 70–99)
GLUCOSE BLD-MCNC: 133 MG/DL (ref 70–99)
GLUCOSE BLD-MCNC: 149 MG/DL (ref 70–99)
GLUCOSE BLD-MCNC: 166 MG/DL (ref 70–99)
HCT VFR BLD CALC: 36.4 % (ref 36–48)
HEMOGLOBIN: 11.5 G/DL (ref 12–16)
LACTIC ACID, SEPSIS: 2.3 MMOL/L (ref 0.4–1.9)
LYMPHOCYTES ABSOLUTE: 0.9 K/UL (ref 1–5.1)
LYMPHOCYTES RELATIVE PERCENT: 8.3 %
MCH RBC QN AUTO: 25.5 PG (ref 26–34)
MCHC RBC AUTO-ENTMCNC: 31.5 G/DL (ref 31–36)
MCV RBC AUTO: 81 FL (ref 80–100)
MONOCYTES ABSOLUTE: 0.7 K/UL (ref 0–1.3)
MONOCYTES RELATIVE PERCENT: 6.4 %
NEUTROPHILS ABSOLUTE: 9.2 K/UL (ref 1.7–7.7)
NEUTROPHILS RELATIVE PERCENT: 83.2 %
PDW BLD-RTO: 18.7 % (ref 12.4–15.4)
PERFORMED ON: ABNORMAL
PLATELET # BLD: 389 K/UL (ref 135–450)
PMV BLD AUTO: 8.8 FL (ref 5–10.5)
POTASSIUM REFLEX MAGNESIUM: 3.9 MMOL/L (ref 3.5–5.1)
RBC # BLD: 4.49 M/UL (ref 4–5.2)
SEDIMENTATION RATE, ERYTHROCYTE: 58 MM/HR (ref 0–30)
SODIUM BLD-SCNC: 136 MMOL/L (ref 136–145)
SOURCE BODY FLUID: NORMAL
WBC # BLD: 11 K/UL (ref 4–11)

## 2020-04-27 PROCEDURE — 1200000000 HC SEMI PRIVATE

## 2020-04-27 PROCEDURE — 2580000003 HC RX 258: Performed by: INTERNAL MEDICINE

## 2020-04-27 PROCEDURE — 86140 C-REACTIVE PROTEIN: CPT

## 2020-04-27 PROCEDURE — 36415 COLL VENOUS BLD VENIPUNCTURE: CPT

## 2020-04-27 PROCEDURE — 85025 COMPLETE CBC W/AUTO DIFF WBC: CPT

## 2020-04-27 PROCEDURE — 99221 1ST HOSP IP/OBS SF/LOW 40: CPT | Performed by: PHYSICIAN ASSISTANT

## 2020-04-27 PROCEDURE — 80048 BASIC METABOLIC PNL TOTAL CA: CPT

## 2020-04-27 PROCEDURE — 96365 THER/PROPH/DIAG IV INF INIT: CPT

## 2020-04-27 PROCEDURE — 83605 ASSAY OF LACTIC ACID: CPT

## 2020-04-27 PROCEDURE — 6370000000 HC RX 637 (ALT 250 FOR IP): Performed by: INTERNAL MEDICINE

## 2020-04-27 PROCEDURE — 83036 HEMOGLOBIN GLYCOSYLATED A1C: CPT

## 2020-04-27 PROCEDURE — 20605 DRAIN/INJ JOINT/BURSA W/O US: CPT

## 2020-04-27 PROCEDURE — 73630 X-RAY EXAM OF FOOT: CPT

## 2020-04-27 PROCEDURE — 89060 EXAM SYNOVIAL FLUID CRYSTALS: CPT

## 2020-04-27 PROCEDURE — 87070 CULTURE OTHR SPECIMN AEROBIC: CPT

## 2020-04-27 PROCEDURE — 99284 EMERGENCY DEPT VISIT MOD MDM: CPT

## 2020-04-27 PROCEDURE — 87205 SMEAR GRAM STAIN: CPT

## 2020-04-27 PROCEDURE — 6360000002 HC RX W HCPCS: Performed by: INTERNAL MEDICINE

## 2020-04-27 PROCEDURE — 96375 TX/PRO/DX INJ NEW DRUG ADDON: CPT

## 2020-04-27 PROCEDURE — 85652 RBC SED RATE AUTOMATED: CPT

## 2020-04-27 PROCEDURE — 87040 BLOOD CULTURE FOR BACTERIA: CPT

## 2020-04-27 PROCEDURE — 6360000002 HC RX W HCPCS: Performed by: EMERGENCY MEDICINE

## 2020-04-27 PROCEDURE — 73610 X-RAY EXAM OF ANKLE: CPT

## 2020-04-27 PROCEDURE — 2580000003 HC RX 258: Performed by: EMERGENCY MEDICINE

## 2020-04-27 RX ORDER — ALBUTEROL SULFATE 2.5 MG/3ML
2.5 SOLUTION RESPIRATORY (INHALATION) EVERY 4 HOURS PRN
Status: DISCONTINUED | OUTPATIENT
Start: 2020-04-27 | End: 2020-04-30 | Stop reason: HOSPADM

## 2020-04-27 RX ORDER — ACETAMINOPHEN 650 MG/1
650 SUPPOSITORY RECTAL EVERY 6 HOURS PRN
Status: DISCONTINUED | OUTPATIENT
Start: 2020-04-27 | End: 2020-04-30 | Stop reason: HOSPADM

## 2020-04-27 RX ORDER — 0.9 % SODIUM CHLORIDE 0.9 %
500 INTRAVENOUS SOLUTION INTRAVENOUS ONCE
Status: COMPLETED | OUTPATIENT
Start: 2020-04-27 | End: 2020-04-27

## 2020-04-27 RX ORDER — PANTOPRAZOLE SODIUM 40 MG/1
40 TABLET, DELAYED RELEASE ORAL 2 TIMES DAILY
Status: DISCONTINUED | OUTPATIENT
Start: 2020-04-27 | End: 2020-04-30 | Stop reason: HOSPADM

## 2020-04-27 RX ORDER — POTASSIUM CHLORIDE 20 MEQ/1
40 TABLET, EXTENDED RELEASE ORAL 4 TIMES DAILY
Status: DISCONTINUED | OUTPATIENT
Start: 2020-04-27 | End: 2020-04-30 | Stop reason: HOSPADM

## 2020-04-27 RX ORDER — TORSEMIDE 100 MG/1
100 TABLET ORAL DAILY
Status: DISCONTINUED | OUTPATIENT
Start: 2020-04-27 | End: 2020-04-30 | Stop reason: HOSPADM

## 2020-04-27 RX ORDER — DEXTROSE MONOHYDRATE 25 G/50ML
12.5 INJECTION, SOLUTION INTRAVENOUS PRN
Status: DISCONTINUED | OUTPATIENT
Start: 2020-04-27 | End: 2020-04-30 | Stop reason: HOSPADM

## 2020-04-27 RX ORDER — SODIUM CHLORIDE 0.9 % (FLUSH) 0.9 %
10 SYRINGE (ML) INJECTION PRN
Status: DISCONTINUED | OUTPATIENT
Start: 2020-04-27 | End: 2020-04-30 | Stop reason: HOSPADM

## 2020-04-27 RX ORDER — POLYETHYLENE GLYCOL 3350 17 G/17G
17 POWDER, FOR SOLUTION ORAL DAILY PRN
Status: DISCONTINUED | OUTPATIENT
Start: 2020-04-27 | End: 2020-04-30 | Stop reason: HOSPADM

## 2020-04-27 RX ORDER — BENZTROPINE MESYLATE 1 MG/1
1 TABLET ORAL NIGHTLY
Status: DISCONTINUED | OUTPATIENT
Start: 2020-04-27 | End: 2020-04-30 | Stop reason: HOSPADM

## 2020-04-27 RX ORDER — FAMOTIDINE 20 MG/1
20 TABLET, FILM COATED ORAL 2 TIMES DAILY
Status: DISCONTINUED | OUTPATIENT
Start: 2020-04-27 | End: 2020-04-30 | Stop reason: HOSPADM

## 2020-04-27 RX ORDER — MORPHINE SULFATE 4 MG/ML
4 INJECTION, SOLUTION INTRAMUSCULAR; INTRAVENOUS ONCE
Status: COMPLETED | OUTPATIENT
Start: 2020-04-27 | End: 2020-04-27

## 2020-04-27 RX ORDER — PROMETHAZINE HYDROCHLORIDE 25 MG/1
12.5 TABLET ORAL EVERY 6 HOURS PRN
Status: DISCONTINUED | OUTPATIENT
Start: 2020-04-27 | End: 2020-04-29

## 2020-04-27 RX ORDER — ACETAMINOPHEN 325 MG/1
650 TABLET ORAL EVERY 6 HOURS PRN
Status: DISCONTINUED | OUTPATIENT
Start: 2020-04-27 | End: 2020-04-30 | Stop reason: HOSPADM

## 2020-04-27 RX ORDER — BUSPIRONE HYDROCHLORIDE 5 MG/1
30 TABLET ORAL 2 TIMES DAILY
Status: DISCONTINUED | OUTPATIENT
Start: 2020-04-27 | End: 2020-04-30 | Stop reason: HOSPADM

## 2020-04-27 RX ORDER — SPIRONOLACTONE 25 MG/1
25 TABLET ORAL DAILY
Status: DISCONTINUED | OUTPATIENT
Start: 2020-04-27 | End: 2020-04-30 | Stop reason: HOSPADM

## 2020-04-27 RX ORDER — CLOPIDOGREL BISULFATE 75 MG/1
75 TABLET ORAL DAILY
Status: DISCONTINUED | OUTPATIENT
Start: 2020-04-27 | End: 2020-04-30 | Stop reason: HOSPADM

## 2020-04-27 RX ORDER — ONDANSETRON 2 MG/ML
4 INJECTION INTRAMUSCULAR; INTRAVENOUS EVERY 6 HOURS PRN
Status: DISCONTINUED | OUTPATIENT
Start: 2020-04-27 | End: 2020-04-29

## 2020-04-27 RX ORDER — BUPRENORPHINE AND NALOXONE 8; 2 MG/1; MG/1
1 FILM, SOLUBLE BUCCAL; SUBLINGUAL 2 TIMES DAILY
Status: DISCONTINUED | OUTPATIENT
Start: 2020-04-27 | End: 2020-04-30 | Stop reason: HOSPADM

## 2020-04-27 RX ORDER — MIDODRINE HYDROCHLORIDE 5 MG/1
5 TABLET ORAL 3 TIMES DAILY
Status: DISCONTINUED | OUTPATIENT
Start: 2020-04-27 | End: 2020-04-30 | Stop reason: HOSPADM

## 2020-04-27 RX ORDER — ARIPIPRAZOLE 10 MG/1
10 TABLET ORAL DAILY
Status: DISCONTINUED | OUTPATIENT
Start: 2020-04-27 | End: 2020-04-30 | Stop reason: HOSPADM

## 2020-04-27 RX ORDER — SODIUM CHLORIDE 0.9 % (FLUSH) 0.9 %
10 SYRINGE (ML) INJECTION EVERY 12 HOURS SCHEDULED
Status: DISCONTINUED | OUTPATIENT
Start: 2020-04-27 | End: 2020-04-30 | Stop reason: HOSPADM

## 2020-04-27 RX ORDER — ASPIRIN 81 MG/1
81 TABLET ORAL DAILY
Status: DISCONTINUED | OUTPATIENT
Start: 2020-04-27 | End: 2020-04-30 | Stop reason: HOSPADM

## 2020-04-27 RX ORDER — ATORVASTATIN CALCIUM 80 MG/1
80 TABLET, FILM COATED ORAL NIGHTLY
Status: DISCONTINUED | OUTPATIENT
Start: 2020-04-27 | End: 2020-04-30 | Stop reason: HOSPADM

## 2020-04-27 RX ORDER — NICOTINE POLACRILEX 4 MG
15 LOZENGE BUCCAL PRN
Status: DISCONTINUED | OUTPATIENT
Start: 2020-04-27 | End: 2020-04-30 | Stop reason: HOSPADM

## 2020-04-27 RX ORDER — DEXTROSE MONOHYDRATE 50 MG/ML
100 INJECTION, SOLUTION INTRAVENOUS PRN
Status: DISCONTINUED | OUTPATIENT
Start: 2020-04-27 | End: 2020-04-30 | Stop reason: HOSPADM

## 2020-04-27 RX ORDER — MORPHINE SULFATE 2 MG/ML
1 INJECTION, SOLUTION INTRAMUSCULAR; INTRAVENOUS EVERY 4 HOURS PRN
Status: DISCONTINUED | OUTPATIENT
Start: 2020-04-27 | End: 2020-04-29

## 2020-04-27 RX ORDER — INSULIN GLARGINE 100 [IU]/ML
30 INJECTION, SOLUTION SUBCUTANEOUS NIGHTLY
Status: DISCONTINUED | OUTPATIENT
Start: 2020-04-27 | End: 2020-04-30 | Stop reason: HOSPADM

## 2020-04-27 RX ADMIN — LAMOTRIGINE 150 MG: 25 TABLET ORAL at 21:50

## 2020-04-27 RX ADMIN — CEFTRIAXONE SODIUM 1 G: 1 INJECTION, POWDER, FOR SOLUTION INTRAMUSCULAR; INTRAVENOUS at 05:52

## 2020-04-27 RX ADMIN — MIDODRINE HYDROCHLORIDE 5 MG: 5 TABLET ORAL at 21:50

## 2020-04-27 RX ADMIN — POTASSIUM CHLORIDE 40 MEQ: 20 TABLET, EXTENDED RELEASE ORAL at 21:51

## 2020-04-27 RX ADMIN — CLOPIDOGREL BISULFATE 75 MG: 75 TABLET ORAL at 15:22

## 2020-04-27 RX ADMIN — MORPHINE SULFATE 1 MG: 2 INJECTION, SOLUTION INTRAMUSCULAR; INTRAVENOUS at 17:59

## 2020-04-27 RX ADMIN — TORSEMIDE 100 MG: 100 TABLET ORAL at 15:29

## 2020-04-27 RX ADMIN — INSULIN LISPRO 10 UNITS: 100 INJECTION, SOLUTION INTRAVENOUS; SUBCUTANEOUS at 21:55

## 2020-04-27 RX ADMIN — INSULIN LISPRO 1 UNITS: 100 INJECTION, SOLUTION INTRAVENOUS; SUBCUTANEOUS at 17:50

## 2020-04-27 RX ADMIN — BENZTROPINE MESYLATE 1 MG: 1 TABLET ORAL at 21:49

## 2020-04-27 RX ADMIN — BUSPIRONE HYDROCHLORIDE 30 MG: 5 TABLET ORAL at 15:21

## 2020-04-27 RX ADMIN — ASPIRIN 81 MG: 81 TABLET ORAL at 15:22

## 2020-04-27 RX ADMIN — MORPHINE SULFATE 4 MG: 4 INJECTION, SOLUTION INTRAMUSCULAR; INTRAVENOUS at 05:17

## 2020-04-27 RX ADMIN — VANCOMYCIN HYDROCHLORIDE 1000 MG: 1 INJECTION, POWDER, LYOPHILIZED, FOR SOLUTION INTRAVENOUS at 05:53

## 2020-04-27 RX ADMIN — MIDODRINE HYDROCHLORIDE 5 MG: 5 TABLET ORAL at 15:23

## 2020-04-27 RX ADMIN — MAGNESIUM GLUCONATE 500 MG ORAL TABLET 400 MG: 500 TABLET ORAL at 15:22

## 2020-04-27 RX ADMIN — SPIRONOLACTONE 25 MG: 25 TABLET ORAL at 15:29

## 2020-04-27 RX ADMIN — MORPHINE SULFATE 1 MG: 2 INJECTION, SOLUTION INTRAMUSCULAR; INTRAVENOUS at 22:02

## 2020-04-27 RX ADMIN — PIPERACILLIN AND TAZOBACTAM 3.38 G: 3; .375 INJECTION, POWDER, FOR SOLUTION INTRAVENOUS at 22:05

## 2020-04-27 RX ADMIN — LAMOTRIGINE 150 MG: 25 TABLET ORAL at 15:21

## 2020-04-27 RX ADMIN — BUSPIRONE HYDROCHLORIDE 30 MG: 5 TABLET ORAL at 21:49

## 2020-04-27 RX ADMIN — MAGNESIUM GLUCONATE 500 MG ORAL TABLET 400 MG: 500 TABLET ORAL at 21:50

## 2020-04-27 RX ADMIN — ENOXAPARIN SODIUM 40 MG: 40 INJECTION SUBCUTANEOUS at 15:21

## 2020-04-27 RX ADMIN — ARIPIPRAZOLE 10 MG: 10 TABLET ORAL at 15:22

## 2020-04-27 RX ADMIN — FAMOTIDINE 20 MG: 20 TABLET, FILM COATED ORAL at 21:51

## 2020-04-27 RX ADMIN — SODIUM CHLORIDE 500 ML: 9 INJECTION, SOLUTION INTRAVENOUS at 05:53

## 2020-04-27 RX ADMIN — PANTOPRAZOLE SODIUM 40 MG: 40 TABLET, DELAYED RELEASE ORAL at 15:22

## 2020-04-27 RX ADMIN — POTASSIUM CHLORIDE 40 MEQ: 20 TABLET, EXTENDED RELEASE ORAL at 15:23

## 2020-04-27 RX ADMIN — ATORVASTATIN CALCIUM 80 MG: 80 TABLET, FILM COATED ORAL at 21:50

## 2020-04-27 RX ADMIN — PIPERACILLIN AND TAZOBACTAM 3.38 G: 3; .375 INJECTION, POWDER, FOR SOLUTION INTRAVENOUS at 15:20

## 2020-04-27 RX ADMIN — MORPHINE SULFATE 1 MG: 2 INJECTION, SOLUTION INTRAMUSCULAR; INTRAVENOUS at 13:44

## 2020-04-27 ASSESSMENT — PAIN DESCRIPTION - LOCATION: LOCATION: FOOT;ANKLE

## 2020-04-27 ASSESSMENT — ENCOUNTER SYMPTOMS
WHEEZING: 0
ABDOMINAL DISTENTION: 0
VOMITING: 0
DIARRHEA: 0
SHORTNESS OF BREATH: 0
BACK PAIN: 0
NAUSEA: 0
PHOTOPHOBIA: 0
RHINORRHEA: 0
COUGH: 0

## 2020-04-27 ASSESSMENT — PAIN SCALES - GENERAL
PAINLEVEL_OUTOF10: 0
PAINLEVEL_OUTOF10: 8
PAINLEVEL_OUTOF10: 9

## 2020-04-27 ASSESSMENT — PAIN DESCRIPTION - DESCRIPTORS: DESCRIPTORS: THROBBING

## 2020-04-27 ASSESSMENT — PAIN DESCRIPTION - PAIN TYPE: TYPE: ACUTE PAIN

## 2020-04-27 ASSESSMENT — PAIN DESCRIPTION - ORIENTATION: ORIENTATION: LEFT

## 2020-04-27 NOTE — H&P
Hospital Medicine History & Physical      PCP: Estevan Collet, PA-C    Date of Admission: 4/27/2020    Date of Service: Pt seen/examined on 4/27/2020 and Admitted to Inpatient with expected LOS greater than two midnights due to medical therapy. Chief Complaint: Left leg, ankle and foot pain      History Of Present Illness:      62 y.o. female who presented to [facility] with history of coronary artery disease, peripheral vascular disease, tobacco abuse, diabetes, presented with chief complaint of increasing pain and redness of the left foot and ankle area for last several days. Patient was admitted for further evaluation management.     Past Medical History:          Diagnosis Date    Anxiety and depression     Arthritis     CAD (coronary artery disease) 01/2018    Cardiomyopathy ([address]     CHF (congestive heart failure) ([facility]     Chronic systolic heart failure ([address] 2/21/2020    COPD (chronic obstructive pulmonary disease) ([facility]     Diabetes mellitus (HCC)     GERD (gastroesophageal reflux disease)     Hyperlipidemia     Hypertension     Hypotension     MI (myocardial infarction) ([address]     Peripheral neuropathy     Peripheral vascular disease ([address]     Pulmonary HTN ([address]     Reflux     Sleep apnea     has never done sleep study;does not use CPAP    Wears glasses     for reading       Past Surgical History:          Procedure Laterality Date    ARTERIAL BYPASS SURGRY Left 01/06/2016    Axillary/Subclavian to Brachial Bypass w/reversed SVG    CARDIAC CATHETERIZATION  03/27/2018    Dr. Kati Berger - at [facility]  01/20/2020    Dr. Gerber Ricketts      pancreatitis post surgery    CORONARY ANGIOPLASTY WITH STENT PLACEMENT  03/16/2018    MELODY- 3.5 x 38 and 3.5 x 20 to Cx    CORONARY ANGIOPLASTY WITH STENT PLACEMENT  01/03/2018    MELODY- 3.0 x 38 and 2.75 x 26 and 2.75 x 8 and 2.75 x 22 to the LAD    CORONARY ARTERY BYPASS hours. No results for input(s): INR in the last 72 hours. No results for input(s): Remi Seip in the last 72 hours. Urinalysis:      Lab Results   Component Value Date    NITRU Negative 03/10/2020    WBCUA 3-5 09/28/2019    BACTERIA Rare 09/28/2019    RBCUA None seen 09/28/2019    BLOODU Negative 03/10/2020    SPECGRAV 1.010 03/10/2020    GLUCOSEU Negative 03/10/2020       Radiology:     CXR: I have reviewed the CXR with the following interpretation: NA  EKG:  I have reviewed the EKG with the following interpretation: NA    No orders to display       ASSESSMENT:    ARELY/Jose Kelly 1106 Problems    Diagnosis Date Noted    Left foot pain [M79.672] 04/27/2020     Priority: High    Body mass index (BMI) 40.0-44.9, adult ([facility] [Z68.41] 02/28/2020    Chronic systolic heart failure ([facility] [I50.22] 02/21/2020    Cellulitis of both feet [L03.115, L03.116] 12/26/2019    PVD (peripheral vascular disease) with claudication ([facility] [I73.9]     Tobacco abuse disorder [Z72.0] 12/10/2015    Essential hypertension [I10] 04/13/2011         PLAN:    Left foot and ankle pain, concern for possible cellulitis and ankle joint infection as per ER, admit to [facility] telemetry, patient had aspiration of the ankle fluid done in the emergency room, culture and cell count testing pending orthopedic surgery consult, blood culture pending, patient was continued on Zosyn for now. Peripheral vascular disease, continue antiplatelet therapy and statin, vascular surgery consult. Patient is a known history of peripheral vascular disease and plan for angioplasty of the left lower extremity. Chronic systolic heart failure without any exacerbation, continue current regimen of diuretics, monitor. CKD stage III, likely due to CHF and chronic use of diuretic, monitor renal function. Diabetes mellitus type 2, ported controlled with current regimen, add low-dose insulin sliding scale.     Hypertension, controlled with current regimen,

## 2020-04-27 NOTE — ED PROVIDER NOTES
Gastrointestinal: Negative for abdominal distention, diarrhea, nausea and vomiting. Genitourinary: Negative for dysuria and hematuria. Musculoskeletal: Positive for arthralgias and joint swelling. Negative for back pain and neck pain. Skin: Negative for rash and wound. Neurological: Negative for syncope and weakness. Psychiatric/Behavioral: Negative for agitation and confusion. Exam  ED Triage Vitals [04/27/20 0353]   BP Temp Temp Source Pulse Resp SpO2 Height Weight   119/73 97.7 °F (36.5 °C) Oral 103 15 99 % 5' 4\" (1.626 m) 242 lb (109.8 kg)       Physical Exam  Vitals signs and nursing note reviewed. Constitutional:       General: She is not in acute distress. Appearance: She is well-developed. HENT:      Head: Normocephalic and atraumatic. Nose: Nose normal. No congestion. Eyes:      Extraocular Movements: Extraocular movements intact. Pupils: Pupils are equal, round, and reactive to light. Neck:      Musculoskeletal: Normal range of motion and neck supple. Cardiovascular:      Rate and Rhythm: Normal rate and regular rhythm. Heart sounds: No murmur. Pulmonary:      Effort: Pulmonary effort is normal.      Breath sounds: Normal breath sounds. Musculoskeletal: Normal range of motion. General: No deformity. Comments: Left ankle is mildly swollen compared to contralateral side. There is some swelling over the dorsum of the foot. It is warm to touch. It is very tender to palpation over the entire ankle joint, reduced range of motion secondary to pain. Additionally there is tenderness over the dorsum of the left foot. No palpable pedal pulses, able to detect faint pedal pulses by ultrasound. Capillary refill time 2 seconds all toes. Additionally there are some erythematous patchy areas over the anterior shins bilaterally with no demarcation lines. Skin:     General: Skin is warm. Findings: No rash.    Neurological:      Mental Status: She is alert and oriented to person, place, and time. Motor: No abnormal muscle tone. Psychiatric:         Mood and Affect: Mood normal.         Behavior: Behavior normal.             ED Course    ED Medication Orders (From admission, onward)    Start Ordered     Status Ordering Provider    04/27/20 0600 04/27/20 0532  cefTRIAXone (ROCEPHIN) 1 g in sterile water 10 mL IV syringe  EVERY 24 HOURS      Last MAR action:  New Bag - by Virginia Shinguenin on 04/27/20 at [place] [address], SHERIE L    04/27/20 0600 04/27/20 0532  vancomycin 1000 mg IVPB in 250 mL D5W addavial  ONCE      Last MAR action:  New Bag - by Virginia Huguenin on 04/27/20 at [place] [address], SHERIE L    04/27/20 0600 04/27/20 0534  0.9 % sodium chloride bolus  ONCE      Last MAR action:  New Bag - by Virginia Shinguenin on 04/27/20 at [place] [address], SHERIE L    04/27/20 0530 04/27/20 0510  morphine sulfate (PF) injection 4 mg  ONCE      Last MAR action:  Given - by Virginia Finch on 04/27/20 at [address] [facility]            Radiology  Xr Ankle Left (min 3 Views)    Result Date: 4/27/2020  EXAMINATION: THREE XRAY VIEWS OF THE LEFT FOOT; THREE XRAY VIEWS OF THE LEFT ANKLE 4/27/2020 4:14 am COMPARISON: 04/27/2017 HISTORY: ORDERING SYSTEM PROVIDED HISTORY: pain TECHNOLOGIST PROVIDED HISTORY: Reason for exam:->pain Reason for Exam: lt foot/ankle pain x 2 days Acuity: Acute Type of Exam: Initial Relevant Medical/Surgical History: dm, pvd; ORDERING SYSTEM PROVIDED HISTORY: pain, sweeling TECHNOLOGIST PROVIDED HISTORY: Reason for exam:->pain, sweeling Reason for Exam: lt foot/ankle pain x 2 days Acuity: Acute Type of Exam: Initial Mechanism of Injury: twisting injury Relevant Medical/Surgical History: dm, pvd FINDINGS: There is no fracture or malalignment. Achilles tendon enthesophyte and plantar calcaneal spur are again seen. There is mild diffuse soft tissue swelling. No fracture or malalignment.      Xr Foot Left (min 3 Views)    Result Date: proximally 1/2 to 1 cc of sanguinous fluid. Will be sent for Gram stain culture and cell count. We will start the patient on empiric antibiotics. I suspect that this is an arthritis picture. Less likely but possible is vascular etiology. She is awaiting angiogram by Dr. Butch Carter that was canceled from earlier this month. Given the possibility of vascular etiology, patient would benefit from admission at [facility].  I discussed the case with Dr. Rodriguez Hernandez who agrees to accept patient for further management. At the time of transfer she is hemodynamically stable. Clinical Impression:  1. Arthritis of ankle joint    2. Acute left ankle pain    3. Acute kidney injury superimposed on CKD (HCC)    4. Elevated lactic acid level          Disposition:  Admit to med/surg floor in stable condition. Blood pressure 119/73, pulse 103, temperature 97.7 °F (36.5 °C), temperature source Oral, resp. rate 15, height 5' 4\" (1.626 m), weight 242 lb (109.8 kg), last menstrual period 10/24/2012, SpO2 99 %, not currently breastfeeding. Patient was given scripts for the following medications. I counseled patient how to take these medications. New Prescriptions    No medications on file       Disposition referral (if applicable):  No follow-up provider specified. Total critical care time is 0 minutes, which excludes separately billable procedures and updating family. Time spent is specifically for management of the presenting complaint and symptoms initially, direct bedside care, reevaluation, review of records, and consultation. There was a high probability of clinically significant life-threatening deterioration in the patient's condition, which required my urgent intervention. This chart was generated in part by using Dragon Dictation system and may contain errors related to that system including errors in grammar, punctuation, and spelling, as well as words and phrases that may be inappropriate.  If there are any questions or concerns please feel free to contact the dictating provider for clarification.      Javi Blunt MD  [address] MD  04/27/20 0600

## 2020-04-28 ENCOUNTER — APPOINTMENT (OUTPATIENT)
Dept: MRI IMAGING | Age: 55
DRG: 182 | End: 2020-04-28
Attending: INTERNAL MEDICINE
Payer: COMMERCIAL

## 2020-04-28 LAB
ANION GAP SERPL CALCULATED.3IONS-SCNC: 11 MMOL/L (ref 3–16)
BASOPHILS ABSOLUTE: 0.1 K/UL (ref 0–0.2)
BASOPHILS RELATIVE PERCENT: 0.5 %
BUN BLDV-MCNC: 37 MG/DL (ref 7–20)
CALCIUM SERPL-MCNC: 9.9 MG/DL (ref 8.3–10.6)
CHLORIDE BLD-SCNC: 86 MMOL/L (ref 99–110)
CO2: 35 MMOL/L (ref 21–32)
CREAT SERPL-MCNC: 1.4 MG/DL (ref 0.6–1.1)
EOSINOPHILS ABSOLUTE: 0.3 K/UL (ref 0–0.6)
EOSINOPHILS RELATIVE PERCENT: 2.7 %
ESTIMATED AVERAGE GLUCOSE: 134.1 MG/DL
GFR AFRICAN AMERICAN: 47
GFR NON-AFRICAN AMERICAN: 39
GLUCOSE BLD-MCNC: 112 MG/DL (ref 70–99)
GLUCOSE BLD-MCNC: 128 MG/DL (ref 70–99)
GLUCOSE BLD-MCNC: 131 MG/DL (ref 70–99)
GLUCOSE BLD-MCNC: 179 MG/DL (ref 70–99)
GLUCOSE BLD-MCNC: 264 MG/DL (ref 70–99)
GLUCOSE BLD-MCNC: 287 MG/DL (ref 70–99)
HBA1C MFR BLD: 6.3 %
HCT VFR BLD CALC: 36.3 % (ref 36–48)
HEMOGLOBIN: 11.5 G/DL (ref 12–16)
LYMPHOCYTES ABSOLUTE: 1.1 K/UL (ref 1–5.1)
LYMPHOCYTES RELATIVE PERCENT: 9.2 %
MCH RBC QN AUTO: 25.4 PG (ref 26–34)
MCHC RBC AUTO-ENTMCNC: 31.6 G/DL (ref 31–36)
MCV RBC AUTO: 80.4 FL (ref 80–100)
MONOCYTES ABSOLUTE: 0.8 K/UL (ref 0–1.3)
MONOCYTES RELATIVE PERCENT: 6.3 %
NEUTROPHILS ABSOLUTE: 10.1 K/UL (ref 1.7–7.7)
NEUTROPHILS RELATIVE PERCENT: 81.3 %
PDW BLD-RTO: 19.1 % (ref 12.4–15.4)
PERFORMED ON: ABNORMAL
PLATELET # BLD: 429 K/UL (ref 135–450)
PMV BLD AUTO: 8.9 FL (ref 5–10.5)
POTASSIUM REFLEX MAGNESIUM: 4 MMOL/L (ref 3.5–5.1)
RBC # BLD: 4.52 M/UL (ref 4–5.2)
SODIUM BLD-SCNC: 132 MMOL/L (ref 136–145)
WBC # BLD: 12.5 K/UL (ref 4–11)

## 2020-04-28 PROCEDURE — 73718 MRI LOWER EXTREMITY W/O DYE: CPT

## 2020-04-28 PROCEDURE — 94640 AIRWAY INHALATION TREATMENT: CPT

## 2020-04-28 PROCEDURE — 99254 IP/OBS CNSLTJ NEW/EST MOD 60: CPT | Performed by: SURGERY

## 2020-04-28 PROCEDURE — 6370000000 HC RX 637 (ALT 250 FOR IP): Performed by: INTERNAL MEDICINE

## 2020-04-28 PROCEDURE — 1200000000 HC SEMI PRIVATE

## 2020-04-28 PROCEDURE — 2580000003 HC RX 258: Performed by: INTERNAL MEDICINE

## 2020-04-28 PROCEDURE — 73721 MRI JNT OF LWR EXTRE W/O DYE: CPT

## 2020-04-28 PROCEDURE — APPNB30 APP NON BILLABLE TIME 0-30 MINS: Performed by: PHYSICIAN ASSISTANT

## 2020-04-28 PROCEDURE — 85025 COMPLETE CBC W/AUTO DIFF WBC: CPT

## 2020-04-28 PROCEDURE — 6360000002 HC RX W HCPCS: Performed by: INTERNAL MEDICINE

## 2020-04-28 PROCEDURE — 80048 BASIC METABOLIC PNL TOTAL CA: CPT

## 2020-04-28 PROCEDURE — 2580000003 HC RX 258

## 2020-04-28 PROCEDURE — 36415 COLL VENOUS BLD VENIPUNCTURE: CPT

## 2020-04-28 RX ORDER — SODIUM CHLORIDE 9 MG/ML
INJECTION, SOLUTION INTRAVENOUS
Status: COMPLETED
Start: 2020-04-28 | End: 2020-04-28

## 2020-04-28 RX ADMIN — MORPHINE SULFATE 1 MG: 2 INJECTION, SOLUTION INTRAMUSCULAR; INTRAVENOUS at 12:05

## 2020-04-28 RX ADMIN — INSULIN GLARGINE 30 UNITS: 100 INJECTION, SOLUTION SUBCUTANEOUS at 20:28

## 2020-04-28 RX ADMIN — INSULIN LISPRO 10 UNITS: 100 INJECTION, SOLUTION INTRAVENOUS; SUBCUTANEOUS at 09:05

## 2020-04-28 RX ADMIN — LAMOTRIGINE 150 MG: 25 TABLET ORAL at 20:16

## 2020-04-28 RX ADMIN — ONDANSETRON 4 MG: 2 INJECTION INTRAMUSCULAR; INTRAVENOUS at 17:59

## 2020-04-28 RX ADMIN — TORSEMIDE 100 MG: 100 TABLET ORAL at 08:58

## 2020-04-28 RX ADMIN — POTASSIUM CHLORIDE 40 MEQ: 20 TABLET, EXTENDED RELEASE ORAL at 15:19

## 2020-04-28 RX ADMIN — INSULIN LISPRO 3 UNITS: 100 INJECTION, SOLUTION INTRAVENOUS; SUBCUTANEOUS at 16:56

## 2020-04-28 RX ADMIN — MIDODRINE HYDROCHLORIDE 5 MG: 5 TABLET ORAL at 20:17

## 2020-04-28 RX ADMIN — POTASSIUM CHLORIDE 40 MEQ: 20 TABLET, EXTENDED RELEASE ORAL at 18:00

## 2020-04-28 RX ADMIN — INSULIN LISPRO 10 UNITS: 100 INJECTION, SOLUTION INTRAVENOUS; SUBCUTANEOUS at 14:01

## 2020-04-28 RX ADMIN — MIDODRINE HYDROCHLORIDE 5 MG: 5 TABLET ORAL at 15:19

## 2020-04-28 RX ADMIN — ENOXAPARIN SODIUM 40 MG: 40 INJECTION SUBCUTANEOUS at 09:06

## 2020-04-28 RX ADMIN — ARIPIPRAZOLE 10 MG: 10 TABLET ORAL at 08:58

## 2020-04-28 RX ADMIN — PIPERACILLIN AND TAZOBACTAM 3.38 G: 3; .375 INJECTION, POWDER, FOR SOLUTION INTRAVENOUS at 15:20

## 2020-04-28 RX ADMIN — CLOPIDOGREL BISULFATE 75 MG: 75 TABLET ORAL at 08:58

## 2020-04-28 RX ADMIN — ASPIRIN 81 MG: 81 TABLET ORAL at 08:58

## 2020-04-28 RX ADMIN — SODIUM CHLORIDE 250 ML: 900 INJECTION, SOLUTION INTRAVENOUS at 03:21

## 2020-04-28 RX ADMIN — MAGNESIUM GLUCONATE 500 MG ORAL TABLET 400 MG: 500 TABLET ORAL at 20:17

## 2020-04-28 RX ADMIN — PIPERACILLIN AND TAZOBACTAM 3.38 G: 3; .375 INJECTION, POWDER, FOR SOLUTION INTRAVENOUS at 20:21

## 2020-04-28 RX ADMIN — PIPERACILLIN AND TAZOBACTAM 3.38 G: 3; .375 INJECTION, POWDER, FOR SOLUTION INTRAVENOUS at 06:17

## 2020-04-28 RX ADMIN — MORPHINE SULFATE 1 MG: 2 INJECTION, SOLUTION INTRAMUSCULAR; INTRAVENOUS at 20:23

## 2020-04-28 RX ADMIN — PANTOPRAZOLE SODIUM 40 MG: 40 TABLET, DELAYED RELEASE ORAL at 16:04

## 2020-04-28 RX ADMIN — POTASSIUM CHLORIDE 40 MEQ: 20 TABLET, EXTENDED RELEASE ORAL at 20:17

## 2020-04-28 RX ADMIN — BENZTROPINE MESYLATE 1 MG: 1 TABLET ORAL at 20:17

## 2020-04-28 RX ADMIN — BUSPIRONE HYDROCHLORIDE 30 MG: 5 TABLET ORAL at 20:17

## 2020-04-28 RX ADMIN — Medication 10 ML: at 07:34

## 2020-04-28 RX ADMIN — BUSPIRONE HYDROCHLORIDE 30 MG: 5 TABLET ORAL at 08:59

## 2020-04-28 RX ADMIN — MIDODRINE HYDROCHLORIDE 5 MG: 5 TABLET ORAL at 08:58

## 2020-04-28 RX ADMIN — INSULIN LISPRO 10 UNITS: 100 INJECTION, SOLUTION INTRAVENOUS; SUBCUTANEOUS at 20:28

## 2020-04-28 RX ADMIN — ATORVASTATIN CALCIUM 80 MG: 80 TABLET, FILM COATED ORAL at 20:17

## 2020-04-28 RX ADMIN — FAMOTIDINE 20 MG: 20 TABLET, FILM COATED ORAL at 08:57

## 2020-04-28 RX ADMIN — PANTOPRAZOLE SODIUM 40 MG: 40 TABLET, DELAYED RELEASE ORAL at 08:58

## 2020-04-28 RX ADMIN — FAMOTIDINE 20 MG: 20 TABLET, FILM COATED ORAL at 20:17

## 2020-04-28 RX ADMIN — POTASSIUM CHLORIDE 40 MEQ: 20 TABLET, EXTENDED RELEASE ORAL at 08:58

## 2020-04-28 RX ADMIN — MORPHINE SULFATE 1 MG: 2 INJECTION, SOLUTION INTRAMUSCULAR; INTRAVENOUS at 16:05

## 2020-04-28 RX ADMIN — MAGNESIUM GLUCONATE 500 MG ORAL TABLET 400 MG: 500 TABLET ORAL at 08:58

## 2020-04-28 RX ADMIN — PROMETHAZINE HYDROCHLORIDE 12.5 MG: 25 TABLET ORAL at 20:37

## 2020-04-28 RX ADMIN — MORPHINE SULFATE 1 MG: 2 INJECTION, SOLUTION INTRAMUSCULAR; INTRAVENOUS at 03:22

## 2020-04-28 RX ADMIN — TIOTROPIUM BROMIDE INHALATION SPRAY 2 PUFF: 3.12 SPRAY, METERED RESPIRATORY (INHALATION) at 07:46

## 2020-04-28 RX ADMIN — INSULIN LISPRO 1 UNITS: 100 INJECTION, SOLUTION INTRAVENOUS; SUBCUTANEOUS at 09:01

## 2020-04-28 RX ADMIN — SPIRONOLACTONE 25 MG: 25 TABLET ORAL at 08:57

## 2020-04-28 RX ADMIN — LAMOTRIGINE 150 MG: 25 TABLET ORAL at 08:57

## 2020-04-28 RX ADMIN — MORPHINE SULFATE 1 MG: 2 INJECTION, SOLUTION INTRAMUSCULAR; INTRAVENOUS at 07:32

## 2020-04-28 ASSESSMENT — PAIN SCALES - GENERAL
PAINLEVEL_OUTOF10: 8
PAINLEVEL_OUTOF10: 0
PAINLEVEL_OUTOF10: 9
PAINLEVEL_OUTOF10: 9
PAINLEVEL_OUTOF10: 0
PAINLEVEL_OUTOF10: 8
PAINLEVEL_OUTOF10: 8

## 2020-04-28 NOTE — CONSULTS
Vascular Surgery Consultation    Date of Admission:  4/26/2020 11:48 AM  Date of Consultation:  4/28/2020    PCP:  Billy Howell PA-C       Chief Complaint: Left leg, ankle and foot pain. History of Present Illness: We are asked to see this patient in consultation by Dr. Graeme Mcginnis  Regarding PVD. Belton Simmonds is a 62 y.o. female who has a history of Vascular disease. She is s/p R Fem-pop and L SFA stenting. She has known instent stenosis or occlusion on the lle and recurrent claudication symptoms for some time. I saw her previously as an oupt and we were planning to schedule for angio but she also noted severe CP. Ultimately she underwent a CABG. She was again seen post-op and was indeed scheduled for angio. On day of angio labs revealed severe SHAUNNA and thus angio cancelled. She presents now with continue claudication symptoms but new acute onset pain in foot laterally and ankle. She has been evaluated by Ortho. MRI has been ordered.        Past Medical History:  Past Medical History:   Diagnosis Date    Anxiety and depression     Arthritis     CAD (coronary artery disease) 01/2018    Cardiomyopathy ([address]     CHF (congestive heart failure) (HCC)     Chronic systolic heart failure ([address] 2/21/2020    COPD (chronic obstructive pulmonary disease) (HCC)     Diabetes mellitus (HCC)     GERD (gastroesophageal reflux disease)     Hyperlipidemia     Hypertension     Hypotension     MI (myocardial infarction) ([address]     Peripheral neuropathy     Peripheral vascular disease ([address]     Pulmonary HTN ([address]     Reflux     Sleep apnea     has never done sleep study;does not use CPAP    Wears glasses     for reading       Past Surgical History:  Past Surgical History:   Procedure Laterality Date    ARTERIAL BYPASS SURGRY Left 01/06/2016    Axillary/Subclavian to Brachial Bypass w/reversed SVG    CARDIAC CATHETERIZATION  03/27/2018    Dr. Florence Gan - [facility] the tongue 2 times daily.     Historical Provider, MD        Facility Administered Medications:    aspirin EC  81 mg Oral Daily    ARIPiprazole  10 mg Oral Daily    torsemide  100 mg Oral Daily    tiotropium  2 puff Inhalation Daily    potassium chloride  40 mEq Oral 4x Daily    spironolactone  25 mg Oral Daily    pantoprazole  40 mg Oral BID    midodrine  5 mg Oral TID    magnesium oxide  400 mg Oral BID    insulin lispro  10 Units Subcutaneous TID    Insulin Degludec  30 Units Subcutaneous Nightly    lamoTRIgine  150 mg Oral BID    Liraglutide  1.2 mg Subcutaneous Daily    atorvastatin  80 mg Oral Nightly    benztropine  1 mg Oral Nightly    buprenorphine-naloxone  1 Film Sublingual BID    busPIRone  30 mg Oral BID    clopidogrel  75 mg Oral Daily    sodium chloride flush  10 mL Intravenous 2 times per day    famotidine  20 mg Oral BID    enoxaparin  40 mg Subcutaneous Daily    insulin lispro  0-6 Units Subcutaneous TID WC    insulin lispro  0-3 Units Subcutaneous Nightly    piperacillin-tazobactam  3.375 g Intravenous Q8H       Allergies:  Lisinopril     Social History:      Social History     Socioeconomic History    Marital status: Single     Spouse name: Not on file    Number of children: Not on file    Years of education: Not on file    Highest education level: Not on file   Occupational History    Not on file   Social Needs    Financial resource strain: Not on file    Food insecurity     Worry: Not on file     Inability: Not on file    Transportation needs     Medical: Not on file     Non-medical: Not on file   Tobacco Use    Smoking status: Current Some Day Smoker     Packs/day: 0.10     Years: 30.00     Pack years: 3.00     Types: Cigarettes     Last attempt to quit: 2019     Years since quittin.7    Smokeless tobacco: Never Used    Tobacco comment: trying to quit   Substance and Sexual Activity    Alcohol use: No    Drug use: Never    Sexual activity: Yes 11.0   HGB 11.5*   HCT 36.4   MCV 81.0        BMP:   Recent Labs     04/27/20  0431      K 3.9   CL 85*   CO2 36*   BUN 46*   CREATININE 1.7*   CALCIUM 9.7     Cardiac Enzymes: No results for input(s): CKTOTAL, CKMB, CKMBINDEX, TROPONINI in the last 72 hours. PT/INR: No results for input(s): PROTIME, INR in the last 72 hours. APTT: No results for input(s): APTT in the last 72 hours. Liver Profile:  Lab Results   Component Value Date    AST 20 03/20/2020    ALT 11 03/20/2020    BILIDIR <0.2 01/27/2020    BILITOT 0.4 03/20/2020    ALKPHOS 147 03/20/2020     Lab Results   Component Value Date    CHOL 58 01/27/2020    HDL 21 01/27/2020    TRIG 78 01/27/2020     TSH:  Lab Results   Component Value Date    TSH 2.03 01/10/2020     UA:   Lab Results   Component Value Date    COLORU Straw 03/10/2020    PHUR 6.5 03/10/2020    WBCUA 3-5 09/28/2019    RBCUA None seen 09/28/2019    BACTERIA Rare 09/28/2019    CLARITYU Clear 03/10/2020    SPECGRAV 1.010 03/10/2020    LEUKOCYTESUR Negative 03/10/2020    UROBILINOGEN 0.2 03/10/2020    BILIRUBINUR Negative 03/10/2020    BLOODU Negative 03/10/2020    GLUCOSEU Negative 03/10/2020           Diagnosis:  PVD with claudication, ? ischemic rest pain. L Foot/ankle pain- some may be due to ischemia but concerned there may be Orthopedic issue. Ortho following and MRI pending. CAD-stable s/p CABG. CKD- Creat improve, now 1.4       Plan:  LLE angio with possible intervention. I have discussed all risks (including but not limited to bleeding, thrombosis, contrast allergy, renal failure, and early and late failure of intervention), benefits and alternatives of catheter-based angiography. Patient freely consents and is eager to proceed. All questions and expectations addressed.     ASA 3 - Patient with moderate systemic disease with functional limitations    Mallampati Airway Assessment:  Mallampati Class III - (soft palate & base of uvula are visible)

## 2020-04-29 ENCOUNTER — APPOINTMENT (OUTPATIENT)
Dept: CARDIAC CATH/INVASIVE PROCEDURES | Age: 55
DRG: 182 | End: 2020-04-29
Attending: INTERNAL MEDICINE
Payer: COMMERCIAL

## 2020-04-29 LAB
GLUCOSE BLD-MCNC: 130 MG/DL (ref 70–99)
GLUCOSE BLD-MCNC: 173 MG/DL (ref 70–99)
GLUCOSE BLD-MCNC: 96 MG/DL (ref 70–99)
PERFORMED ON: ABNORMAL
PERFORMED ON: ABNORMAL
PERFORMED ON: NORMAL
POC ACT LR: 151 SEC
POC ACT LR: 205 SEC

## 2020-04-29 PROCEDURE — C1769 GUIDE WIRE: HCPCS

## 2020-04-29 PROCEDURE — 2580000003 HC RX 258: Performed by: INTERNAL MEDICINE

## 2020-04-29 PROCEDURE — 047L3ZZ DILATION OF LEFT FEMORAL ARTERY, PERCUTANEOUS APPROACH: ICD-10-PCS | Performed by: SURGERY

## 2020-04-29 PROCEDURE — 6370000000 HC RX 637 (ALT 250 FOR IP): Performed by: INTERNAL MEDICINE

## 2020-04-29 PROCEDURE — 6360000002 HC RX W HCPCS: Performed by: INTERNAL MEDICINE

## 2020-04-29 PROCEDURE — 99153 MOD SED SAME PHYS/QHP EA: CPT

## 2020-04-29 PROCEDURE — C1725 CATH, TRANSLUMIN NON-LASER: HCPCS

## 2020-04-29 PROCEDURE — 99152 MOD SED SAME PHYS/QHP 5/>YRS: CPT

## 2020-04-29 PROCEDURE — 75710 ARTERY X-RAYS ARM/LEG: CPT | Performed by: SURGERY

## 2020-04-29 PROCEDURE — 2500000003 HC RX 250 WO HCPCS

## 2020-04-29 PROCEDURE — C1887 CATHETER, GUIDING: HCPCS

## 2020-04-29 PROCEDURE — 2580000003 HC RX 258

## 2020-04-29 PROCEDURE — 37224 PR REVSC OPN/PRG FEM/POP W/ANGIOPLASTY UNI: CPT | Performed by: SURGERY

## 2020-04-29 PROCEDURE — B41G1ZZ FLUOROSCOPY OF LEFT LOWER EXTREMITY ARTERIES USING LOW OSMOLAR CONTRAST: ICD-10-PCS | Performed by: SURGERY

## 2020-04-29 PROCEDURE — 76937 US GUIDE VASCULAR ACCESS: CPT | Performed by: SURGERY

## 2020-04-29 PROCEDURE — C1894 INTRO/SHEATH, NON-LASER: HCPCS

## 2020-04-29 PROCEDURE — APPNB30 APP NON BILLABLE TIME 0-30 MINS: Performed by: PHYSICIAN ASSISTANT

## 2020-04-29 PROCEDURE — 85347 COAGULATION TIME ACTIVATED: CPT

## 2020-04-29 PROCEDURE — 37224 HC FEM POP TERRITORY PLASTY: CPT

## 2020-04-29 PROCEDURE — 2060000000 HC ICU INTERMEDIATE R&B

## 2020-04-29 PROCEDURE — 2709999900 HC NON-CHARGEABLE SUPPLY

## 2020-04-29 PROCEDURE — 6360000004 HC RX CONTRAST MEDICATION

## 2020-04-29 PROCEDURE — 75710 ARTERY X-RAYS ARM/LEG: CPT

## 2020-04-29 PROCEDURE — 6360000002 HC RX W HCPCS

## 2020-04-29 PROCEDURE — 2580000003 HC RX 258: Performed by: SURGERY

## 2020-04-29 PROCEDURE — 99152 MOD SED SAME PHYS/QHP 5/>YRS: CPT | Performed by: SURGERY

## 2020-04-29 RX ORDER — PROMETHAZINE HYDROCHLORIDE 25 MG/ML
12.5 INJECTION, SOLUTION INTRAMUSCULAR; INTRAVENOUS EVERY 4 HOURS PRN
Status: DISCONTINUED | OUTPATIENT
Start: 2020-04-29 | End: 2020-04-30 | Stop reason: HOSPADM

## 2020-04-29 RX ORDER — PROMETHAZINE HYDROCHLORIDE 25 MG/1
12.5 TABLET ORAL EVERY 4 HOURS PRN
Status: DISCONTINUED | OUTPATIENT
Start: 2020-04-29 | End: 2020-04-30 | Stop reason: HOSPADM

## 2020-04-29 RX ORDER — FENTANYL CITRATE 50 UG/ML
INJECTION, SOLUTION INTRAMUSCULAR; INTRAVENOUS
Status: COMPLETED | OUTPATIENT
Start: 2020-04-29 | End: 2020-04-29

## 2020-04-29 RX ORDER — SODIUM CHLORIDE 0.9 % (FLUSH) 0.9 %
10 SYRINGE (ML) INJECTION PRN
Status: DISCONTINUED | OUTPATIENT
Start: 2020-04-29 | End: 2020-04-30 | Stop reason: HOSPADM

## 2020-04-29 RX ORDER — ONDANSETRON 2 MG/ML
4 INJECTION INTRAMUSCULAR; INTRAVENOUS EVERY 4 HOURS PRN
Status: DISCONTINUED | OUTPATIENT
Start: 2020-04-29 | End: 2020-04-30 | Stop reason: HOSPADM

## 2020-04-29 RX ORDER — SODIUM CHLORIDE 0.9 % (FLUSH) 0.9 %
10 SYRINGE (ML) INJECTION EVERY 12 HOURS SCHEDULED
Status: DISCONTINUED | OUTPATIENT
Start: 2020-04-29 | End: 2020-04-30 | Stop reason: HOSPADM

## 2020-04-29 RX ORDER — ACETAMINOPHEN 325 MG/1
650 TABLET ORAL EVERY 4 HOURS PRN
Status: DISCONTINUED | OUTPATIENT
Start: 2020-04-29 | End: 2020-04-29 | Stop reason: SDUPTHER

## 2020-04-29 RX ORDER — HEPARIN SODIUM 1000 [USP'U]/ML
INJECTION, SOLUTION INTRAVENOUS; SUBCUTANEOUS
Status: COMPLETED | OUTPATIENT
Start: 2020-04-29 | End: 2020-04-29

## 2020-04-29 RX ORDER — ONDANSETRON 4 MG/1
4 TABLET, ORALLY DISINTEGRATING ORAL EVERY 4 HOURS PRN
Status: DISCONTINUED | OUTPATIENT
Start: 2020-04-29 | End: 2020-04-30 | Stop reason: HOSPADM

## 2020-04-29 RX ORDER — MORPHINE SULFATE 2 MG/ML
2 INJECTION, SOLUTION INTRAMUSCULAR; INTRAVENOUS EVERY 4 HOURS PRN
Status: DISCONTINUED | OUTPATIENT
Start: 2020-04-29 | End: 2020-04-30 | Stop reason: HOSPADM

## 2020-04-29 RX ORDER — MIDAZOLAM HYDROCHLORIDE 1 MG/ML
INJECTION INTRAMUSCULAR; INTRAVENOUS
Status: COMPLETED | OUTPATIENT
Start: 2020-04-29 | End: 2020-04-29

## 2020-04-29 RX ORDER — ONDANSETRON 2 MG/ML
INJECTION INTRAMUSCULAR; INTRAVENOUS
Status: COMPLETED | OUTPATIENT
Start: 2020-04-29 | End: 2020-04-29

## 2020-04-29 RX ORDER — HYDRALAZINE HYDROCHLORIDE 20 MG/ML
10 INJECTION INTRAMUSCULAR; INTRAVENOUS EVERY 10 MIN PRN
Status: DISCONTINUED | OUTPATIENT
Start: 2020-04-29 | End: 2020-04-30 | Stop reason: HOSPADM

## 2020-04-29 RX ORDER — ONDANSETRON 2 MG/ML
4 INJECTION INTRAMUSCULAR; INTRAVENOUS EVERY 6 HOURS PRN
Status: DISCONTINUED | OUTPATIENT
Start: 2020-04-29 | End: 2020-04-29 | Stop reason: SDUPTHER

## 2020-04-29 RX ORDER — PROMETHAZINE HYDROCHLORIDE 6.25 MG/5ML
12.5 SYRUP ORAL EVERY 4 HOURS PRN
Status: DISCONTINUED | OUTPATIENT
Start: 2020-04-29 | End: 2020-04-30 | Stop reason: HOSPADM

## 2020-04-29 RX ORDER — SODIUM CHLORIDE 9 MG/ML
INJECTION, SOLUTION INTRAVENOUS
Status: DISPENSED
Start: 2020-04-29 | End: 2020-04-30

## 2020-04-29 RX ADMIN — MORPHINE SULFATE 1 MG: 2 INJECTION, SOLUTION INTRAMUSCULAR; INTRAVENOUS at 00:21

## 2020-04-29 RX ADMIN — PIPERACILLIN AND TAZOBACTAM 3.38 G: 3; .375 INJECTION, POWDER, FOR SOLUTION INTRAVENOUS at 03:49

## 2020-04-29 RX ADMIN — TORSEMIDE 100 MG: 100 TABLET ORAL at 15:05

## 2020-04-29 RX ADMIN — POTASSIUM CHLORIDE 40 MEQ: 20 TABLET, EXTENDED RELEASE ORAL at 17:00

## 2020-04-29 RX ADMIN — ENOXAPARIN SODIUM 40 MG: 40 INJECTION SUBCUTANEOUS at 15:07

## 2020-04-29 RX ADMIN — MIDAZOLAM HYDROCHLORIDE 1 MG: 1 INJECTION INTRAMUSCULAR; INTRAVENOUS at 08:59

## 2020-04-29 RX ADMIN — Medication 10 ML: at 15:07

## 2020-04-29 RX ADMIN — PIPERACILLIN AND TAZOBACTAM 3.38 G: 3; .375 INJECTION, POWDER, FOR SOLUTION INTRAVENOUS at 15:06

## 2020-04-29 RX ADMIN — INSULIN GLARGINE 30 UNITS: 100 INJECTION, SOLUTION SUBCUTANEOUS at 21:39

## 2020-04-29 RX ADMIN — INSULIN LISPRO 1 UNITS: 100 INJECTION, SOLUTION INTRAVENOUS; SUBCUTANEOUS at 21:40

## 2020-04-29 RX ADMIN — ATORVASTATIN CALCIUM 80 MG: 80 TABLET, FILM COATED ORAL at 20:27

## 2020-04-29 RX ADMIN — MAGNESIUM GLUCONATE 500 MG ORAL TABLET 400 MG: 500 TABLET ORAL at 20:27

## 2020-04-29 RX ADMIN — MIDAZOLAM HYDROCHLORIDE 1 MG: 1 INJECTION INTRAMUSCULAR; INTRAVENOUS at 09:03

## 2020-04-29 RX ADMIN — ASPIRIN 81 MG: 81 TABLET ORAL at 07:58

## 2020-04-29 RX ADMIN — SPIRONOLACTONE 25 MG: 25 TABLET ORAL at 15:12

## 2020-04-29 RX ADMIN — PROMETHAZINE HYDROCHLORIDE 12.5 MG: 25 TABLET ORAL at 15:06

## 2020-04-29 RX ADMIN — FENTANYL CITRATE 25 MCG: 50 INJECTION, SOLUTION INTRAMUSCULAR; INTRAVENOUS at 08:59

## 2020-04-29 RX ADMIN — BUSPIRONE HYDROCHLORIDE 30 MG: 5 TABLET ORAL at 20:27

## 2020-04-29 RX ADMIN — Medication 10 ML: at 20:28

## 2020-04-29 RX ADMIN — MIDAZOLAM HYDROCHLORIDE 1 MG: 1 INJECTION INTRAMUSCULAR; INTRAVENOUS at 09:44

## 2020-04-29 RX ADMIN — INSULIN LISPRO 10 UNITS: 100 INJECTION, SOLUTION INTRAVENOUS; SUBCUTANEOUS at 21:43

## 2020-04-29 RX ADMIN — FENTANYL CITRATE 50 MCG: 50 INJECTION, SOLUTION INTRAMUSCULAR; INTRAVENOUS at 09:39

## 2020-04-29 RX ADMIN — MORPHINE SULFATE 2 MG: 2 INJECTION, SOLUTION INTRAMUSCULAR; INTRAVENOUS at 15:06

## 2020-04-29 RX ADMIN — MIDAZOLAM HYDROCHLORIDE 1 MG: 1 INJECTION INTRAMUSCULAR; INTRAVENOUS at 09:46

## 2020-04-29 RX ADMIN — PROMETHAZINE HYDROCHLORIDE 12.5 MG: 25 TABLET ORAL at 03:48

## 2020-04-29 RX ADMIN — HEPARIN SODIUM 2000 UNITS: 1000 INJECTION, SOLUTION INTRAVENOUS; SUBCUTANEOUS at 09:23

## 2020-04-29 RX ADMIN — MIDODRINE HYDROCHLORIDE 5 MG: 5 TABLET ORAL at 20:28

## 2020-04-29 RX ADMIN — BENZTROPINE MESYLATE 1 MG: 1 TABLET ORAL at 20:28

## 2020-04-29 RX ADMIN — ARIPIPRAZOLE 10 MG: 10 TABLET ORAL at 15:05

## 2020-04-29 RX ADMIN — PIPERACILLIN AND TAZOBACTAM 3.38 G: 3; .375 INJECTION, POWDER, FOR SOLUTION INTRAVENOUS at 21:44

## 2020-04-29 RX ADMIN — MORPHINE SULFATE 1 MG: 2 INJECTION, SOLUTION INTRAMUSCULAR; INTRAVENOUS at 04:33

## 2020-04-29 RX ADMIN — CLOPIDOGREL BISULFATE 75 MG: 75 TABLET ORAL at 07:58

## 2020-04-29 RX ADMIN — ONDANSETRON 4 MG: 2 INJECTION INTRAMUSCULAR; INTRAVENOUS at 09:00

## 2020-04-29 RX ADMIN — ONDANSETRON 4 MG: 2 INJECTION INTRAMUSCULAR; INTRAVENOUS at 11:37

## 2020-04-29 RX ADMIN — HEPARIN SODIUM 3000 UNITS: 1000 INJECTION, SOLUTION INTRAVENOUS; SUBCUTANEOUS at 09:17

## 2020-04-29 RX ADMIN — ONDANSETRON 4 MG: 2 INJECTION INTRAMUSCULAR; INTRAVENOUS at 07:48

## 2020-04-29 RX ADMIN — MORPHINE SULFATE 2 MG: 2 INJECTION, SOLUTION INTRAMUSCULAR; INTRAVENOUS at 20:28

## 2020-04-29 RX ADMIN — ONDANSETRON 4 MG: 2 INJECTION INTRAMUSCULAR; INTRAVENOUS at 00:21

## 2020-04-29 RX ADMIN — POTASSIUM CHLORIDE 40 MEQ: 20 TABLET, EXTENDED RELEASE ORAL at 21:44

## 2020-04-29 RX ADMIN — PANTOPRAZOLE SODIUM 40 MG: 40 TABLET, DELAYED RELEASE ORAL at 17:00

## 2020-04-29 RX ADMIN — PROMETHAZINE HYDROCHLORIDE 12.5 MG: 25 INJECTION INTRAMUSCULAR; INTRAVENOUS at 06:37

## 2020-04-29 RX ADMIN — LAMOTRIGINE 150 MG: 25 TABLET ORAL at 20:27

## 2020-04-29 RX ADMIN — FAMOTIDINE 20 MG: 20 TABLET, FILM COATED ORAL at 20:27

## 2020-04-29 RX ADMIN — BUSPIRONE HYDROCHLORIDE 30 MG: 5 TABLET ORAL at 07:58

## 2020-04-29 RX ADMIN — FENTANYL CITRATE 25 MCG: 50 INJECTION, SOLUTION INTRAMUSCULAR; INTRAVENOUS at 09:03

## 2020-04-29 ASSESSMENT — PAIN SCALES - GENERAL
PAINLEVEL_OUTOF10: 10
PAINLEVEL_OUTOF10: 8
PAINLEVEL_OUTOF10: 8
PAINLEVEL_OUTOF10: 0
PAINLEVEL_OUTOF10: 0
PAINLEVEL_OUTOF10: 10
PAINLEVEL_OUTOF10: 8
PAINLEVEL_OUTOF10: 10

## 2020-04-29 NOTE — OP NOTE
[address] [place] [address]                                OPERATIVE REPORT    PATIENT NAME: Daina BADILLO                    :        1965  MED REC NO:   [id number]                          ROOM:       0423  ACCOUNT NO:   [de-identified]                           ADMIT DATE: 2020  PROVIDER:     Karlee Dorantes MD    DATE OF PROCEDURE:  2020    PREOPERATIVE DIAGNOSES:  Peripheral vascular disease with ischemic rest  pain of the left foot. POSTOPERATIVE DIAGNOSES:  Peripheral vascular disease with ischemic rest  pain of the left foot. PROCEDURES PERFORMED:  1. Ultrasound-guided right femoral artery access. 2.  Left lower extremity angiograms. 3.  Angioplasty of left superficial femoral artery occlusion. ANESTHESIA:  Local with moderate monitored sedation. INDICATIONS:  The patient is a 58-year-old female with history of right  lower extremity bypass and left lower extremity claudication. She has  previously undergone left lower extremity interventions. She has now  presented with recurrent severe claudication and ischemic rest pain of  the left foot. The patient is brought to the angio suite at this time  for a left lower extremity angiogram with intervention. PROCEDURE:  The patient was brought to the angio suite, placed in supine  position. Monitor was placed. Under my direct supervision, Versed and  fentanyl were administered for moderate sedation. The patient was  monitored by an independent trained nurse observer using continuous  blood pressure, EKG and pulse oximetry. I spent 60 minutes face-to-face  with the patient providing and directing sedation. An ultrasound probe  was prepped and draped sterilely. Ultrasound was performed of the right  femoral region. The right femoral popliteal bypass graft was identified  and traced up to its anastomosis at the common femoral level.   The  common

## 2020-04-30 VITALS
OXYGEN SATURATION: 94 % | SYSTOLIC BLOOD PRESSURE: 118 MMHG | HEART RATE: 91 BPM | RESPIRATION RATE: 18 BRPM | DIASTOLIC BLOOD PRESSURE: 63 MMHG | WEIGHT: 227.1 LBS | HEIGHT: 64 IN | BODY MASS INDEX: 38.77 KG/M2 | TEMPERATURE: 97.7 F

## 2020-04-30 LAB
GLUCOSE BLD-MCNC: 141 MG/DL (ref 70–99)
PERFORMED ON: ABNORMAL

## 2020-04-30 PROCEDURE — 94640 AIRWAY INHALATION TREATMENT: CPT

## 2020-04-30 PROCEDURE — 6360000002 HC RX W HCPCS: Performed by: INTERNAL MEDICINE

## 2020-04-30 PROCEDURE — 2580000003 HC RX 258: Performed by: SURGERY

## 2020-04-30 PROCEDURE — 2580000003 HC RX 258: Performed by: INTERNAL MEDICINE

## 2020-04-30 PROCEDURE — 6370000000 HC RX 637 (ALT 250 FOR IP): Performed by: INTERNAL MEDICINE

## 2020-04-30 RX ORDER — ONDANSETRON 4 MG/1
4 TABLET, FILM COATED ORAL 3 TIMES DAILY PRN
Qty: 15 TABLET | Refills: 0 | Status: ON HOLD | OUTPATIENT
Start: 2020-04-30 | End: 2020-06-03

## 2020-04-30 RX ORDER — HYDROCODONE BITARTRATE AND ACETAMINOPHEN 5; 325 MG/1; MG/1
1 TABLET ORAL EVERY 6 HOURS PRN
Qty: 12 TABLET | Refills: 0 | Status: SHIPPED | OUTPATIENT
Start: 2020-04-30 | End: 2020-05-03

## 2020-04-30 RX ADMIN — Medication 10 ML: at 08:08

## 2020-04-30 RX ADMIN — SPIRONOLACTONE 25 MG: 25 TABLET ORAL at 08:08

## 2020-04-30 RX ADMIN — FAMOTIDINE 20 MG: 20 TABLET, FILM COATED ORAL at 08:07

## 2020-04-30 RX ADMIN — BUSPIRONE HYDROCHLORIDE 30 MG: 5 TABLET ORAL at 08:06

## 2020-04-30 RX ADMIN — TIOTROPIUM BROMIDE INHALATION SPRAY 2 PUFF: 3.12 SPRAY, METERED RESPIRATORY (INHALATION) at 08:47

## 2020-04-30 RX ADMIN — MIDODRINE HYDROCHLORIDE 5 MG: 5 TABLET ORAL at 08:08

## 2020-04-30 RX ADMIN — TORSEMIDE 100 MG: 100 TABLET ORAL at 08:08

## 2020-04-30 RX ADMIN — PIPERACILLIN AND TAZOBACTAM 3.38 G: 3; .375 INJECTION, POWDER, FOR SOLUTION INTRAVENOUS at 04:29

## 2020-04-30 RX ADMIN — Medication 10 ML: at 08:09

## 2020-04-30 RX ADMIN — MORPHINE SULFATE 2 MG: 2 INJECTION, SOLUTION INTRAMUSCULAR; INTRAVENOUS at 00:33

## 2020-04-30 RX ADMIN — ONDANSETRON 4 MG: 2 INJECTION INTRAMUSCULAR; INTRAVENOUS at 08:52

## 2020-04-30 RX ADMIN — ARIPIPRAZOLE 10 MG: 10 TABLET ORAL at 08:07

## 2020-04-30 RX ADMIN — MAGNESIUM GLUCONATE 500 MG ORAL TABLET 400 MG: 500 TABLET ORAL at 08:07

## 2020-04-30 RX ADMIN — LAMOTRIGINE 150 MG: 25 TABLET ORAL at 08:07

## 2020-04-30 RX ADMIN — PANTOPRAZOLE SODIUM 40 MG: 40 TABLET, DELAYED RELEASE ORAL at 08:08

## 2020-04-30 RX ADMIN — CLOPIDOGREL BISULFATE 75 MG: 75 TABLET ORAL at 08:08

## 2020-04-30 RX ADMIN — ENOXAPARIN SODIUM 40 MG: 40 INJECTION SUBCUTANEOUS at 08:08

## 2020-04-30 RX ADMIN — MORPHINE SULFATE 2 MG: 2 INJECTION, SOLUTION INTRAMUSCULAR; INTRAVENOUS at 08:52

## 2020-04-30 RX ADMIN — POTASSIUM CHLORIDE 40 MEQ: 20 TABLET, EXTENDED RELEASE ORAL at 08:08

## 2020-04-30 RX ADMIN — ASPIRIN 81 MG: 81 TABLET ORAL at 08:07

## 2020-04-30 RX ADMIN — MORPHINE SULFATE 2 MG: 2 INJECTION, SOLUTION INTRAMUSCULAR; INTRAVENOUS at 04:29

## 2020-04-30 ASSESSMENT — PAIN SCALES - GENERAL
PAINLEVEL_OUTOF10: 8
PAINLEVEL_OUTOF10: 9
PAINLEVEL_OUTOF10: 9
PAINLEVEL_OUTOF10: 8

## 2020-04-30 NOTE — PLAN OF CARE
Problem: Falls - Risk of:  Goal: Will remain free from falls  Description: Will remain free from falls  4/30/2020 0736 by Lucia Shahid RN  Outcome: Ongoing  Goal: Absence of physical injury  Description: Absence of physical injury  4/30/2020 0736 by Lucia Shahid RN  Outcome: Ongoing     Problem: Pain:  Goal: Pain level will decrease  Description: Pain level will decrease  4/30/2020 0736 by Lucia Shahid RN  Outcome: Ongoing  Patient instructed to use call light if assistance is needed. Call light within reach.

## 2020-04-30 NOTE — PROGRESS NOTES
Department of Orthopedic Surgery  Physician Assistant   Progress Note    Subjective:       Systemic or Specific Complaints:  Continued left foot pain with WB mostly today. She states she was able to ambulate to the bathroom but painful. She believes the erythema is resolving. Objective:     Patient Vitals for the past 24 hrs:   BP Temp Temp src Pulse Resp SpO2 Height Weight   04/28/20 0842 105/69 -- -- -- -- -- -- --   04/28/20 0841 97/60 97.8 °F (36.6 °C) Oral 100 20 96 % -- --   04/28/20 0319 -- -- -- -- -- -- -- 232 lb 4.8 oz (105.4 kg)   04/27/20 2318 105/69 98.8 °F (37.1 °C) Axillary 99 19 98 % -- --   04/27/20 1826 -- -- -- -- -- -- 5' 4\" (1.626 m) 242 lb (109.8 kg)   04/27/20 1441 112/77 97.8 °F (36.6 °C) Oral 103 16 98 % -- --   04/27/20 1255 (!) 91/53 97.6 °F (36.4 °C) Oral 95 16 -- -- --       General: alert, appears stated age, cooperative and no distress   Wound: N/a  Scattered abrasions to bilat LE and UEs   Motion: Painful range of Motion in affected extremity   DVT Exam: No evidence of DVT seen on physical exam.     Additional exam:  Erythema resolving to left LE. No erythema to foot  Non painful palpation to left ankle joint today. More specific pain with palpation over 5th MT only today- severe. Slight pain with palpation to peroneal tendon. Data Review  CBC:   Lab Results   Component Value Date    WBC 12.5 04/28/2020    RBC 4.52 04/28/2020    HGB 11.5 04/28/2020    HCT 36.3 04/28/2020     04/28/2020       Renal:   Lab Results   Component Value Date     04/28/2020    K 4.0 04/28/2020    CL 86 04/28/2020    CO2 35 04/28/2020    BUN 37 04/28/2020    CREATININE 1.4 04/28/2020    GLUCOSE 112 04/28/2020    CALCIUM 9.9 04/28/2020        Left ankle culture:  Pending, negative growth so far  Left ankle gram stain:  2+ WBC, no organisms    Sed rate:  58  CRP 66.7    Assessment:      left foot/ankle cellulitis, 5th MT pain- possible stress fracture? .  PVD.      Plan:      1:
Patient admitted to room 205 from [place]. [facility]. Patient alert and oriented x4. Assessment completed as documented. Vital signs stable. Patient with c/o pain paged MD to inform. See new orders. 4 eyed skin assessment completed as documented. Call light within reach.will monitor.
Patient to be sent home with correct walking boot. Confirmed boot with gareth Mendoza.
Pt a/o. Informed consent signed and in the chart per order. Pt understands nothing by mouth at midnight and understands. Pt continues to complain of left foot pain and medicated per MAR. Appetite good. Pt ate % of breakfast. No nausea; no emesis. Call light within reach; will continue to monitor.
Pt is alert and oriented x 4. Vitals signs are stable. Assessment is as charted. Pt continues to complain of left ankle/foot pain. Pt denies further needs at this time. Will continue to monitor.
Pt refused smoking cessation information
RESPIRATORY THERAPY ASSESSMENT    Name:  Jose Ramon Palomares  Medical Record Number:  [id number]  Age: 62 y.o. Gender: female  : 1965  Today's Date:  2020  Room:  0205/0205-02    Assessment     Is the patient being admitted for a COPD or Asthma exacerbation? No   (If yes the patient will be seen every 4 hours for the first 24 hours and then reassessed)    Patient Admission Diagnosis      Allergies  Allergies   Allergen Reactions    Lisinopril Other (See Comments)     Severe hypotension       Minimum Predicted Vital Capacity:     na          Actual Vital Capacity:      na              Pulmonary History:COPD  Home Oxygen Therapy:  room air  Home Respiratory Therapy: albuterol mdi prn  Current Respiratory Therapy:  Albuterol mdi prn, spirvia          Respiratory Severity Index(RSI)   Patients with orders for inhalation medications, oxygen, or any therapeutic treatment modality will be placed on Respiratory Protocol. They will be assessed with the first treatment and at least every 72 hours thereafter. The following severity scale will be used to determine frequency of treatment intervention.     Smoking History: Pulmonary Disease or Smoking History, Greater than 15 pack year = 2    Social History  Social History     Tobacco Use    Smoking status: Current Some Day Smoker     Packs/day: 0.10     Years: 30.00     Pack years: 3.00     Types: Cigarettes     Last attempt to quit: 2019     Years since quittin.7    Smokeless tobacco: Never Used    Tobacco comment: trying to quit   Substance Use Topics    Alcohol use: No    Drug use: Never       Recent Surgical History: None = 0  Past Surgical History  Past Surgical History:   Procedure Laterality Date    ARTERIAL BYPASS SURGRY Left 2016    Axillary/Subclavian to Brachial Bypass w/reversed SVG    CARDIAC CATHETERIZATION  2018    Dr. Светлана Aleman - at [address] [place]  2020    Dr. Daniel Young
Vascular    LLE angio planned for tomorrow am.   Patient aware, orders written.
Vascular    Patient well known to me. Chronic LLE claudication. She reports pain in left foot/ankle new and unlike prior vascular ischemic pain. Will await Ortho eval.  Will eventually need LLE angio as was planned previously but cancelled due to SHAUNNA.
Vascular    S/P Angio and LLE intervention    R Groin access site soft without bleeding or hematoma. Strong PT and DP signals. Rubor improved. Discharge per Med. Likely no need for further antibiotics. I do not believe redness of foot was cellulitis, rather dependent rubor.
Performed:    BP 96/64   Pulse 91   Temp 97.5 °F (36.4 °C) (Axillary)   Resp 16   Ht 5' 4\" (1.626 m)   Wt 231 lb 14.8 oz (105.2 kg)   LMP 10/24/2012   SpO2 97%   BMI 39.81 kg/m²     General appearance: No apparent distress, appears stated age and cooperative. HEENT: Pupils equal, round, and reactive to light. Conjunctivae/corneas clear. Neck: Supple, with full range of motion. No jugular venous distention. Trachea midline. Respiratory:  Normal respiratory effort. Clear to auscultation, bilaterally without Rales/Wheezes/Rhonchi. Cardiovascular: Regular rate and rhythm with normal S1/S2 without murmurs, rubs or gallops. Abdomen: Soft, non-tender, non-distended with normal bowel sounds. Musculoskeletal: No clubbing, cyanosis or edema bilaterally. Full range of motion without deformity. Skin: Skin color, texture, turgor normal.  No rashes or lesions. Neurologic:  Neurovascularly intact without any focal sensory/motor deficits. Cranial nerves: II-XII intact, grossly non-focal.  Psychiatric: Alert and oriented, thought content appropriate, normal insight  Capillary Refill: Brisk,< 3 seconds   Peripheral Pulses: +2 palpable, equal bilaterally       Labs:   Recent Labs     04/27/20  0431 04/28/20  0716   WBC 11.0 12.5*   HGB 11.5* 11.5*   HCT 36.4 36.3    429     Recent Labs     04/27/20  0431 04/28/20  0716    132*   K 3.9 4.0   CL 85* 86*   CO2 36* 35*   BUN 46* 37*   CREATININE 1.7* 1.4*   CALCIUM 9.7 9.9       Urinalysis:      Lab Results   Component Value Date    NITRU Negative 03/10/2020    WBCUA 3-5 09/28/2019    BACTERIA Rare 09/28/2019    RBCUA None seen 09/28/2019    BLOODU Negative 03/10/2020    SPECGRAV 1.010 03/10/2020    GLUCOSEU Negative 03/10/2020       Radiology:  MRI FOOT LEFT WO CONTRAST   Final Result   1. Low T1 signal in the lateral cuneiform with associated marrow edema could   represent a nondisplaced fracture superimposed on degenerative change.    2. Marrow edema in the
SpO2 96%   BMI 39.87 kg/m²     General appearance: No apparent distress, appears stated age and cooperative. HEENT: Pupils equal, round, and reactive to light. Conjunctivae/corneas clear. Neck: Supple, with full range of motion. No jugular venous distention. Trachea midline. Respiratory:  Normal respiratory effort. Clear to auscultation, bilaterally without Rales/Wheezes/Rhonchi. Cardiovascular: Regular rate and rhythm with normal S1/S2 without murmurs, rubs or gallops. Abdomen: Soft, non-tender, non-distended with normal bowel sounds. Musculoskeletal: No clubbing, cyanosis or edema bilaterally. Full range of motion without deformity. Skin: Skin color, texture, turgor normal.  No rashes or lesions. Neurologic:  Neurovascularly intact without any focal sensory/motor deficits. Cranial nerves: II-XII intact, grossly non-focal.  Psychiatric: Alert and oriented, thought content appropriate, normal insight  Capillary Refill: Brisk,< 3 seconds   Peripheral Pulses: +2 palpable, equal bilaterally       Labs:   Recent Labs     04/27/20  0431 04/28/20  0716   WBC 11.0 12.5*   HGB 11.5* 11.5*   HCT 36.4 36.3    429     Recent Labs     04/27/20  0431 04/28/20  0716    132*   K 3.9 4.0   CL 85* 86*   CO2 36* 35*   BUN 46* 37*   CREATININE 1.7* 1.4*   CALCIUM 9.7 9.9     No results for input(s): AST, ALT, BILIDIR, BILITOT, ALKPHOS in the last 72 hours. No results for input(s): INR in the last 72 hours. No results for input(s): [employer] in the last 72 hours.     Urinalysis:      Lab Results   Component Value Date    NITRU Negative 03/10/2020    WBCUA 3-5 09/28/2019    BACTERIA Rare 09/28/2019    RBCUA None seen 09/28/2019    BLOODU Negative 03/10/2020    SPECGRAV 1.010 03/10/2020    GLUCOSEU Negative 03/10/2020       Radiology:  No orders to display           Assessment/Plan:    Active Hospital Problems    Diagnosis    Left foot pain [M79.672]     Priority: High    Body mass index (BMI)
contrast  report from 04/27/2020 for further details. 3. No sizable tibiotalar joint effusion or clear MR evidence for septic joint. No MR evidence for osteomyelitis. 4. Degenerative changes as detailed above.  Marrow edema in the remaining  tarsal bones likely degenerative. 5. Mild edema throughout the visualized intertarsal musculature. 6. Moderate plantar calcaneal spur.  Mild distal Achilles enthesopathy. 7. Evidence of remote complete ATFL tear. 1. Low T1 signal in the lateral cuneiform with associated marrow edema could  represent a nondisplaced fracture superimposed on degenerative change. 2. Marrow edema in the tarsal bones including the cuboid, lateral cuneiform  and navicular which could in part be degenerative. 3. Mild to moderate degenerative changes at the midfoot and TMT joints. 4. Mild degenerative change of the 1st MTP/MTS joints. 5. Mild edema in the subcutaneous fat about the forefoot and intertarsal  musculature. 6. No evidence for a metatarsal stress fracture. 7. Exam limited due to patient motion. Assessment:      left foot/ankle cellulitis, improving. Possible non displaced lateral cuneiform fracture, midfoot OA. PVD- s/p left LLE angio. Plan:      1:  Continue current plan of care per IM. IV abx for left LE cellulitis, no evidence of septic left ankle joint- cultures negative. Will continue to follow cultures. MRI shows possible lateral cuneiform fracture and midfoot OA- likely source of her lateral foot pain. Discussed with Dr. Joanne Hough, will order low tide walking boot and pt may WBAT. No surgical intervention needed. Discussed with patient and she is in agreement. 2:  Continue Deep venous thrombosis prophylaxis  3:  Continue Pain Control PRN  4:  S/p left LE angio with Dr. Destiny Barlow. 5:  Boot ordered per supply request from [facility].  6:  Follow up outpatient with Dr. Joanne Hough. Instructions placed. No further ortho needs at this time. Please call with any questions.

## 2020-04-30 NOTE — FLOWSHEET NOTE
04/29/20 2024   Assessment   Charting Type Shift assessment   Neurological   Neuro (WDL) X   Level of Consciousness 0   Orientation Level Oriented X4   [place] Coma Scale   Eye Opening 4   Best Verbal Response 5   Best Motor Response 6   [place] Coma Scale Score 15   NIH/MNHISS Stroke Scale   NIH/MNIHSS Stroke Scale Assessed No   HEENT   HEENT (WDL) X   Right Eye Impaired vision   Left Eye Impaired vision   Teeth Missing teeth   Respiratory   Respiratory (WDL) X   Respiratory Pattern Regular   Respiratory Depth Normal   Respiratory Quality/Effort Unlabored   Chest Assessment Chest expansion symmetrical   L Breath Sounds Clear;Diminished   R Breath Sounds Clear;Diminished   Breath Sounds   Right Upper Lobe Clear;Diminished   Right Middle Lobe Clear;Diminished   Right Lower Lobe Clear;Diminished   Left Upper Lobe Clear;Diminished   Left Lower Lobe Clear;Diminished   Cardiac   Cardiac (WDL) WDL   Cardiac Regularity Regular   Heart Sounds S1, S2   Cardiac Rhythm NSR   Rhythm Interpretation   Pulse 107   Cardiac Monitor   Telemetry Monitor On Yes   Telemetry Audible Yes   Telemetry Alarms Set Yes   Pacemaker   Pacemaker No   Gastrointestinal   Abdominal (WDL) WDL   RUQ Bowel Sounds Active   LUQ Bowel Sounds Active   RLQ Bowel Sounds Active   LLQ Bowel Sounds Active   Abdomen Inspection Rounded; Soft   Tenderness Nontender   Peripheral Vascular   Peripheral Vascular (WDL) X   Edema Left lower extremity;Right lower extremity   Edema Generalized Pitting   RUE Edema Trace   LUE Edema Trace   RLE Edema +1;Pitting   LLE Edema +1;Pitting   Sensation RUE Full sensation   Sensation LUE Full sensation   Sensation RLE Pain;Decreased;Tingling   Sensation LLE Pain;Tingling;Decreased   RUE Neurovascular Assessment   Capillary Refill Less than/equal to 3 seconds   Color Appropriate for ethnicity   Temperature Warm   R Radial Pulse +2   LUE Neurovascular Assessment   Capillary Refill Less than/equal to 3 seconds   Color Appropriate for ethnicity   Temperature Warm   L Radial Pulse +2   RLE Neurovascular Assessment   Capillary Refill Less than/equal to 3 seconds   Color Appropriate for ethnicity   Temperature Warm   R Pedal Pulse +1   R Calf Tenderness  Negative   LLE Neurovascular Assessment   Capillary Refill Less than/equal to 3 seconds   Color Appropriate for ethnicity   Temperature Warm   L Pedal Pulse +1   L Calf Tenderness Negative   Puncture Site Assessment 1   Location Femoral - right   Site Assessment No redness, drainage, swelling or hematoma   Dressing Applied Transparent occlusive dressing   Multiple puncture sites No   Skin Color/Condition   Skin Color/Condition (WDL) X   Skin Color Ecchymosis   Skin Condition/Temp Warm;Swollen   Skin Integrity   Skin Integrity (WDL) X   Skin Integrity Bruising   Location Scattered   Multiple Skin Integrity Sites Yes   Skin Integrity Site 2   Skin Integrity Location 2 Redness  (blanchable)   Location 2 coccyx   Preventative Dressing No   Skin Integrity Site 3   Skin Integrity Location 3 Other (Comment)  (diabetic ulcer)    Location 3 L second toe   Skin Integrity Site 4   Skin Integrity Location 4 Other (Comment)  (diabetic ulcer)   Location 4 R foot   Musculoskeletal   Musculoskeletal (WDL) X   RUE Full movement   LUE Full movement   RL Extremity Full movement   LL Extremity Limited movement   Genitourinary   Genitourinary (WDL) WDL   Urine Assessment   Incontinence No   Urine Color Yellow/straw   Urine Appearance Clear   Urine Odor No odor   Anus/Rectum   Anus/Rectum (WDL) WDL  (per patient)   Wound 01/17/20 Toe (Comment  which one) Anterior; Left Closed brown plantar 3rd toe   Date First Assessed/Time First Assessed: 01/17/20 1745   Present on Hospital Admission: Yes  Primary Wound Type: Diabetic Ulcer  Location: (c) Toe (Comment  which one)  Wound Location Orientation: Anterior; Left  Wound Description (Comments): Closed br. ..    Dressing/Treatment Open to air   Wound Assessment Black;Dry Drainage Amount None   Shaye-wound Assessment Dry; Intact   Wound 03/27/20 Foot Right;Plantar   Date First Assessed/Time First Assessed: 03/27/20 1520   Present on Hospital Admission: Yes  Primary Wound Type: Diabetic Ulcer  Location: Foot  Wound Location Orientation: Right;Plantar   Dressing Status Clean;Dry;New drainage   Dressing/Treatment Open to air   Wound Assessment Dry   Drainage Amount None   Shaye-wound Assessment Calloused;Clean;Dry; Intact   Wound 03/10/20 Toe (Comment  which one) Anterior; Left Unstageable   Date First Assessed/Time First Assessed: 03/10/20 1230   Present on Hospital Admission: Yes  Primary Wound Type: Diabetic Ulcer  Location: (c) Toe (Comment  which one)  Wound Location Orientation: Anterior; Left  Wound Description (Comments): Unstageable   Wound Assessment Dry;Brown   Drainage Amount None   Shaye-wound Assessment Clean;Dry; Intact   Wound 04/27/20 Chest Right;Upper   Date First Assessed/Time First Assessed: 04/27/20 2218   Present on Hospital Admission: Yes  Primary Wound Type: (c) Traumatic  Location: Chest  Wound Location Orientation: Right;Upper   Dressing/Treatment Open to air   Wound Assessment Red;Dry   Drainage Amount None   Shaye-wound Assessment Clean;Dry; Intact   Wound 01/20/20 Toe (Comment  which one) Anterior; Left 1st dorsal toe lateral edge of nail, brown & dry   Date First Assessed/Time First Assessed: 01/20/20 0958   Present on Hospital Admission: Yes  Primary Wound Type: Diabetic Ulcer  Location: Toe (Comment  which one)  Wound Location Orientation: Anterior; Left  Wound Description (Comments): 1st dorsal to. .. Dressing/Treatment Open to air   Wound Assessment Brown;Dry   Drainage Amount None   Shaye-wound Assessment Dry; Intact   Incision 03/10/20 Sternum   Date First Assessed/Time First Assessed: 03/10/20 1229   Present on Hospital Admission: Yes  Wound Approximate Age at First Assessment (Weeks): 6 weeks  Primary Wound Type: (c) Incision  Location: Sternum   Wound

## 2020-04-30 NOTE — PLAN OF CARE
Problem: Falls - Risk of:  Goal: Will remain free from falls  Description: Will remain free from falls  Outcome: Ongoing  Goal: Absence of physical injury  Description: Absence of physical injury  Outcome: Ongoing     Problem: Pain:  Goal: Pain level will decrease  Description: Pain level will decrease  Outcome: Ongoing  Goal: Control of acute pain  Description: Control of acute pain  Outcome: Ongoing  Goal: Control of chronic pain  Description: Control of chronic pain  Outcome: Ongoing  Goal: Patient's pain/discomfort is manageable  Description: Patient's pain/discomfort is manageable  Outcome: Ongoing

## 2020-04-30 NOTE — DISCHARGE SUMMARY
tolerated    Diet: cardiac diet      Discharge Medications:     Current Discharge Medication List           Details   HYDROcodone-acetaminophen (NORCO) 5-325 MG per tablet Take 1 tablet by mouth every 6 hours as needed for Pain for up to 3 days. Intended supply: 3 days. Take lowest dose possible to manage pain  Qty: 12 tablet, Refills: 0    Comments: Reduce doses taken as pain becomes manageable  Associated Diagnoses: Left foot pain      ondansetron (ZOFRAN) 4 MG tablet Take 1 tablet by mouth 3 times daily as needed for Nausea or Vomiting  Qty: 15 tablet, Refills: 0              Details   potassium chloride (KLOR-CON M) 20 MEQ extended release tablet Take 2 tablets by mouth 4 times daily  Qty: 240 tablet, Refills: 3      torsemide (DEMADEX) 100 MG tablet 100 mg daily except 50 mg twice weekly (Monday and Thursday).   Qty: 60 tablet, Refills: 5      midodrine (PROAMATINE) 5 MG tablet Take 1 tablet by mouth 3 times daily  Qty: 90 tablet, Refills: 3      spironolactone (ALDACTONE) 25 MG tablet Take 1 tablet by mouth daily  Qty: 30 tablet, Refills: 3      insulin lispro, 1 Unit Dial, 100 UNIT/ML SOPN Inject 10 Units into the skin 3 times daily  Qty: 3 pen, Refills: 2    Associated Diagnoses: Diabetes mellitus type 2 in obese ([facility])      Insulin Degludec (TRESIBA FLEXTOUCH) 100 UNIT/ML SOPN Inject 30 Units into the skin nightly  Qty: 10 pen, Refills: 5    Associated Diagnoses: Diabetes mellitus type 2 in obese ([facility])      atorvastatin (LIPITOR) 80 MG tablet Take 1 tablet by mouth nightly  Qty: 90 tablet, Refills: 3    Associated Diagnoses: Mixed hyperlipidemia; Coronary artery disease involving native coronary artery of native heart with angina pectoris ([facility]); S/P CABG (coronary artery bypass graft)      tiotropium (SPIRIVA RESPIMAT) 2.5 MCG/ACT AERS inhaler INHALE 2 PUFFS INTO THE LUNGS DAILY  Qty: 1 Inhaler, Refills: 6      albuterol sulfate  (90 Base) MCG/ACT inhaler Inhale 2 puffs into the lungs every 6 hours as

## 2020-04-30 NOTE — DISCHARGE INSTR - DIET
[address] Patient Status:  Inpatient    1940 MRN [id number]   [facility] 7NE-A Attending Beverly Youngblood MD   Hosp Day # 6 PCP Gabriel Sanchez MD       POD # 6    Feeling well  Tolerating diet  Good nutrition is important when healing from an illness, injury, or surgery. Follow any nutrition recommendations given to you during your hospital stay.  If you were given an oral nutrition supplement while in the hospital, continue to take this supplement at home. You can take it with meals, in-between meals, and/or before bedtime. These supplements can be purchased at most local grocery stores, pharmacies, and chain super-stores.  If you have any questions about your diet or nutrition, call the hospital and ask for the dietitian. Total Protein 5.0 (L) 6.1-8.3 g/dL   Albumin 2.1 (L) 3.5-4.8 g/dL   Sodium 142 136-144 mmol/L   Potassium 3.7 3.6-5.1 mmol/L   Chloride 109 101-111 mmol/L   CO2 25.0 22.0-32.0 mmol/L   -POTASSIUM   Collection Time: 02/06/17  5:57 AM   Result Value Ref Anna Marie Mcallister

## 2020-05-01 ENCOUNTER — CARE COORDINATION (OUTPATIENT)
Dept: CASE MANAGEMENT | Age: 55
End: 2020-05-01

## 2020-05-01 LAB
BLOOD CULTURE, ROUTINE: NORMAL
CULTURE, BLOOD 2: NORMAL

## 2020-05-01 NOTE — CARE COORDINATION
Patient contacted regarding COVID-19 risk and screening. Care Transition Nurse/ Ambulatory Care Manager contacted the patient by telephone to perform follow-up assessment. Verified name and  with patient as identifiers. Patient has following risk factors of: heart failure. Symptoms reviewed with patient who verbalized the following symptoms: no worsening symptoms. Due to no new or worsening symptoms encounter was not routed to provider for escalation. Education provided regarding infection prevention, and signs and symptoms of COVID-19 and when to seek medical attention with patient who verbalized understanding. Discussed exposure protocols and quarantine from [address] you at higher risk for severe illness  and given an opportunity for questions and concerns. The patient agrees to contact the COVID-19 hotline [phone] or PCP office for questions related to their healthcare. CTN/ACM provided contact information for future reference. From CDC: Are you at higher risk for severe illness?  Wash your hands often.  Avoid close contact (6 feet, which is about two arm lengths) with people who are sick.  Put distance between yourself and other people if COVID-19 is spreading in your community.  Clean and disinfect frequently touched surfaces.  Avoid all cruise travel and non-essential air travel.  Call your healthcare professional if you have concerns about COVID-19 and your underlying condition or if you are sick. For more information on steps you can take to protect yourself, see CDC's How to [address] [place] [address] for follow-up call in 5-7 days based on severity of symptoms and risk factors. Spoke with patient. States she is doing well at home. States that she is hobbling around with her boot on. Discussed elevating leg because of swelling. Discussed pain meds. Patient states has all of her medications.  States she will make an appointment with PCP for next

## 2020-05-12 ENCOUNTER — OFFICE VISIT (OUTPATIENT)
Dept: ORTHOPEDIC SURGERY | Age: 55
End: 2020-05-12
Payer: COMMERCIAL

## 2020-05-12 VITALS — HEIGHT: 64 IN | BODY MASS INDEX: 38.76 KG/M2 | WEIGHT: 227 LBS

## 2020-05-12 PROCEDURE — 3017F COLORECTAL CA SCREEN DOC REV: CPT | Performed by: PODIATRIST

## 2020-05-12 PROCEDURE — G8417 CALC BMI ABV UP PARAM F/U: HCPCS | Performed by: PODIATRIST

## 2020-05-12 PROCEDURE — G8427 DOCREV CUR MEDS BY ELIG CLIN: HCPCS | Performed by: PODIATRIST

## 2020-05-12 PROCEDURE — 1111F DSCHRG MED/CURRENT MED MERGE: CPT | Performed by: PODIATRIST

## 2020-05-12 PROCEDURE — 99203 OFFICE O/P NEW LOW 30 MIN: CPT | Performed by: PODIATRIST

## 2020-05-12 PROCEDURE — 4004F PT TOBACCO SCREEN RCVD TLK: CPT | Performed by: PODIATRIST

## 2020-05-12 NOTE — PROGRESS NOTES
HISTORY OF PRESENT ILLNESS: This is an initial visit for 77-year-old non-insulin-dependent diabetic female who presents with sudden onset of left midfoot pain approximately 3 weeks duration. She states that she simply awoke one day with pain in her left foot. Subsequent x-rays were negative however, an MRI demonstrated a possible lateral cuneiform fracture. She was immobilized in a boot and it did seem to help decrease her pain. FAMILY HISTORY: Documented in chart. SOCIAL HISTORY: Documented in chart. REVIEW OF SYSTEMS: NIDDM with peripheral neuropathy and peripheral vascular disease otherwise the patient denies any issues with dermatologic, pulmonary, cardiovascular, genitourinary, hematologic, gastrointestinal, neurologic, psychiatric, and HEENT systems. Family History, Social History, and Review of Systems were reviewed from patient history form dated on 5/12/2020 and available in the patient's chart under the MEDIA tab. PHYSICAL EXAMINATION:     The patient is alert and orientated x3. She has mild to moderate edema to left foot with dependent rubor. No open lesions are noted. No signs of acute bacterial infection are noted. She has mild palpable tenderness at the dorsal central aspect of the left midfoot. Her sensation is decreased bilateral.  She has nonpalpable DP PT pulses bilateral.    The remainder of the exam is unremarkable. RADIOGRAPHS: 3 nonweightbearing x-ray views of the left foot were reviewed. No acute fracture dislocation is noted. Review of her left foot MRI does demonstrate suspicion for a subacute nonhealed fracture of the lateral cuneiform with osseous edema. ASSESSMENT: Lateral cuneiform fracture, left foot. NIDDM with peripheral neuropathy and peripheral vascular disease      PLAN: The patient was educated on the pathology and its treatment options. She will continue with the boot full-time. She can ambulate as tolerated.   We will see her

## 2020-05-18 LAB
BODY FLUID CULTURE, STERILE: NORMAL
GRAM STAIN RESULT: NORMAL

## 2020-05-19 ENCOUNTER — TELEPHONE (OUTPATIENT)
Dept: ENDOCRINOLOGY | Age: 55
End: 2020-05-19

## 2020-05-19 ENCOUNTER — TELEPHONE (OUTPATIENT)
Dept: CARDIOLOGY CLINIC | Age: 55
End: 2020-05-19

## 2020-05-19 NOTE — TELEPHONE ENCOUNTER
Per [employer] they are requesting an alternative for   KLOR-CON 20 MEQ. Insurance only covers 5 tablets without a PA.  Please advise     [employer] Jillian   [phone]

## 2020-05-20 RX ORDER — INSULIN LISPRO 100 [IU]/ML
10 INJECTION, SOLUTION INTRAVENOUS; SUBCUTANEOUS 3 TIMES DAILY
Qty: 3 PEN | Refills: 2 | Status: SHIPPED | OUTPATIENT
Start: 2020-05-20 | End: 2020-09-22 | Stop reason: SDUPTHER

## 2020-05-26 ENCOUNTER — PROCEDURE VISIT (OUTPATIENT)
Dept: CARDIOLOGY CLINIC | Age: 55
End: 2020-05-26
Payer: COMMERCIAL

## 2020-05-26 ENCOUNTER — OFFICE VISIT (OUTPATIENT)
Dept: ORTHOPEDIC SURGERY | Age: 55
End: 2020-05-26
Payer: COMMERCIAL

## 2020-05-26 VITALS — BODY MASS INDEX: 41.83 KG/M2 | WEIGHT: 245 LBS | HEIGHT: 64 IN

## 2020-05-26 PROCEDURE — G8417 CALC BMI ABV UP PARAM F/U: HCPCS | Performed by: PODIATRIST

## 2020-05-26 PROCEDURE — G8427 DOCREV CUR MEDS BY ELIG CLIN: HCPCS | Performed by: PODIATRIST

## 2020-05-26 PROCEDURE — 3017F COLORECTAL CA SCREEN DOC REV: CPT | Performed by: PODIATRIST

## 2020-05-26 PROCEDURE — 99213 OFFICE O/P EST LOW 20 MIN: CPT | Performed by: PODIATRIST

## 2020-05-26 PROCEDURE — 4004F PT TOBACCO SCREEN RCVD TLK: CPT | Performed by: PODIATRIST

## 2020-05-26 PROCEDURE — 93264 REM MNTR WRLS P-ART PRS SNR: CPT | Performed by: NURSE PRACTITIONER

## 2020-05-26 PROCEDURE — 1111F DSCHRG MED/CURRENT MED MERGE: CPT | Performed by: PODIATRIST

## 2020-05-26 RX ORDER — METOLAZONE 2.5 MG/1
2.5 TABLET ORAL PRN
Qty: 30 TABLET | Refills: 3 | Status: ON HOLD | OUTPATIENT
Start: 2020-05-26 | End: 2020-06-07 | Stop reason: SDUPTHER

## 2020-05-26 NOTE — PROGRESS NOTES
This is a follow-up for the suspected lateral cuneiform fracture of the left foot. She states that she is not having any pain in her left foot any longer. She has moderate edema to the left foot and ankle with mild dependent rubor. No signs of infection are present in her left foot. She has no palpable tenderness across the dorsal aspect of the left midfoot. She has nonpalpable pedal pulses were left foot however, her foot is warm to the touch. Her sensation is slightly diminished bilateral.    Lateral cuneiforms fracture, left foot. NIDDM with peripheral neuropathy and peripheral vascular disease. She can gradually wean off using the boot. She can gradually increase activity as tolerated. I will see her back as needed.

## 2020-05-26 NOTE — TELEPHONE ENCOUNTER
4/15/2020 NPDD  PLAN:  1. Change the torsemide to 100 mg daily except 50 mg on Monday and Thursday  2. Have blood work today or tomorrow  3. Continue the KCL 80 mEq bid  4.  Follow up to be determined    No upcoming appt scheduled.

## 2020-05-27 RX ORDER — INSULIN DEGLUDEC INJECTION 100 U/ML
30 INJECTION, SOLUTION SUBCUTANEOUS NIGHTLY
Qty: 10 PEN | Refills: 5 | Status: SHIPPED | OUTPATIENT
Start: 2020-05-27 | End: 2020-09-22 | Stop reason: SDUPTHER

## 2020-05-27 NOTE — TELEPHONE ENCOUNTER
Medication:   Requested Prescriptions     Pending Prescriptions Disp Refills    Insulin Degludec (TRESIBA FLEXTOUCH) 100 UNIT/ML SOPN 10 pen 5     Sig: Inject 30 Units into the skin nightly     Please check dosing I have a paper request that states 100 units nightly and the office note says 45 units     Last appt:    Next appt: 06/2/2020    Last OARRS:   RX Monitoring 4/30/2020   Chronic Pain > 50 MEDD Re-evaluated the status of the patient's underlying condition causing pain.

## 2020-06-03 ENCOUNTER — APPOINTMENT (OUTPATIENT)
Dept: GENERAL RADIOLOGY | Age: 55
DRG: 194 | End: 2020-06-03
Payer: COMMERCIAL

## 2020-06-03 ENCOUNTER — HOSPITAL ENCOUNTER (INPATIENT)
Age: 55
LOS: 4 days | Discharge: HOME | DRG: 194 | End: 2020-06-07
Attending: EMERGENCY MEDICINE | Admitting: HOSPITALIST
Payer: COMMERCIAL

## 2020-06-03 PROBLEM — L03.90 CELLULITIS: Status: ACTIVE | Noted: 2020-06-03

## 2020-06-03 LAB
A/G RATIO: 1.1 (ref 1.1–2.2)
ALBUMIN SERPL-MCNC: 3.6 G/DL (ref 3.4–5)
ALP BLD-CCNC: 156 U/L (ref 40–129)
ALT SERPL-CCNC: 11 U/L (ref 10–40)
ANION GAP SERPL CALCULATED.3IONS-SCNC: 15 MMOL/L (ref 3–16)
AST SERPL-CCNC: 19 U/L (ref 15–37)
BASOPHILS ABSOLUTE: 0 K/UL (ref 0–0.2)
BASOPHILS ABSOLUTE: 0.1 K/UL (ref 0–0.2)
BASOPHILS RELATIVE PERCENT: 0.5 %
BASOPHILS RELATIVE PERCENT: 0.6 %
BILIRUB SERPL-MCNC: 0.7 MG/DL (ref 0–1)
BUN BLDV-MCNC: 60 MG/DL (ref 7–20)
CALCIUM SERPL-MCNC: 9.1 MG/DL (ref 8.3–10.6)
CHLORIDE BLD-SCNC: 75 MMOL/L (ref 99–110)
CO2: 30 MMOL/L (ref 21–32)
CREAT SERPL-MCNC: 1.6 MG/DL (ref 0.6–1.1)
EOSINOPHILS ABSOLUTE: 0.1 K/UL (ref 0–0.6)
EOSINOPHILS ABSOLUTE: 0.2 K/UL (ref 0–0.6)
EOSINOPHILS RELATIVE PERCENT: 1.3 %
EOSINOPHILS RELATIVE PERCENT: 1.7 %
GFR AFRICAN AMERICAN: 40
GFR NON-AFRICAN AMERICAN: 33
GLOBULIN: 3.4 G/DL
GLUCOSE BLD-MCNC: 273 MG/DL (ref 70–99)
GLUCOSE BLD-MCNC: 291 MG/DL (ref 70–99)
HCT VFR BLD CALC: 34.9 % (ref 36–48)
HCT VFR BLD CALC: 34.9 % (ref 36–48)
HEMOGLOBIN: 10.9 G/DL (ref 12–16)
HEMOGLOBIN: 10.9 G/DL (ref 12–16)
LACTIC ACID: 1.5 MMOL/L (ref 0.4–2)
LACTIC ACID: 2.2 MMOL/L (ref 0.4–2)
LYMPHOCYTES ABSOLUTE: 0.8 K/UL (ref 1–5.1)
LYMPHOCYTES ABSOLUTE: 0.8 K/UL (ref 1–5.1)
LYMPHOCYTES RELATIVE PERCENT: 7.4 %
LYMPHOCYTES RELATIVE PERCENT: 8 %
MAGNESIUM: 1.9 MG/DL (ref 1.8–2.4)
MCH RBC QN AUTO: 23.8 PG (ref 26–34)
MCH RBC QN AUTO: 24.5 PG (ref 26–34)
MCHC RBC AUTO-ENTMCNC: 31.1 G/DL (ref 31–36)
MCHC RBC AUTO-ENTMCNC: 31.2 G/DL (ref 31–36)
MCV RBC AUTO: 76.6 FL (ref 80–100)
MCV RBC AUTO: 78.5 FL (ref 80–100)
MONOCYTES ABSOLUTE: 0.5 K/UL (ref 0–1.3)
MONOCYTES ABSOLUTE: 0.6 K/UL (ref 0–1.3)
MONOCYTES RELATIVE PERCENT: 5.3 %
MONOCYTES RELATIVE PERCENT: 5.7 %
NEUTROPHILS ABSOLUTE: 8.1 K/UL (ref 1.7–7.7)
NEUTROPHILS ABSOLUTE: 8.9 K/UL (ref 1.7–7.7)
NEUTROPHILS RELATIVE PERCENT: 84.5 %
NEUTROPHILS RELATIVE PERCENT: 85 %
PDW BLD-RTO: 19.8 % (ref 12.4–15.4)
PDW BLD-RTO: 20.3 % (ref 12.4–15.4)
PERFORMED ON: ABNORMAL
PLATELET # BLD: 370 K/UL (ref 135–450)
PLATELET # BLD: 424 K/UL (ref 135–450)
PMV BLD AUTO: 8.5 FL (ref 5–10.5)
PMV BLD AUTO: 8.7 FL (ref 5–10.5)
POTASSIUM REFLEX MAGNESIUM: 3.4 MMOL/L (ref 3.5–5.1)
PRO-BNP: 2615 PG/ML (ref 0–124)
RBC # BLD: 4.45 M/UL (ref 4–5.2)
RBC # BLD: 4.56 M/UL (ref 4–5.2)
SODIUM BLD-SCNC: 120 MMOL/L (ref 136–145)
TOTAL PROTEIN: 7 G/DL (ref 6.4–8.2)
TROPONIN: 0.03 NG/ML
TROPONIN: <0.01 NG/ML
WBC # BLD: 10.4 K/UL (ref 4–11)
WBC # BLD: 9.5 K/UL (ref 4–11)

## 2020-06-03 PROCEDURE — 2580000003 HC RX 258: Performed by: HOSPITALIST

## 2020-06-03 PROCEDURE — 93005 ELECTROCARDIOGRAM TRACING: CPT | Performed by: PHYSICIAN ASSISTANT

## 2020-06-03 PROCEDURE — 96365 THER/PROPH/DIAG IV INF INIT: CPT

## 2020-06-03 PROCEDURE — 80053 COMPREHEN METABOLIC PANEL: CPT

## 2020-06-03 PROCEDURE — 84484 ASSAY OF TROPONIN QUANT: CPT

## 2020-06-03 PROCEDURE — 83735 ASSAY OF MAGNESIUM: CPT

## 2020-06-03 PROCEDURE — 36415 COLL VENOUS BLD VENIPUNCTURE: CPT

## 2020-06-03 PROCEDURE — 83036 HEMOGLOBIN GLYCOSYLATED A1C: CPT

## 2020-06-03 PROCEDURE — 71046 X-RAY EXAM CHEST 2 VIEWS: CPT

## 2020-06-03 PROCEDURE — 2580000003 HC RX 258: Performed by: PHYSICIAN ASSISTANT

## 2020-06-03 PROCEDURE — 94761 N-INVAS EAR/PLS OXIMETRY MLT: CPT

## 2020-06-03 PROCEDURE — 6370000000 HC RX 637 (ALT 250 FOR IP): Performed by: HOSPITALIST

## 2020-06-03 PROCEDURE — 85025 COMPLETE CBC W/AUTO DIFF WBC: CPT

## 2020-06-03 PROCEDURE — 6360000002 HC RX W HCPCS: Performed by: PHYSICIAN ASSISTANT

## 2020-06-03 PROCEDURE — 2060000000 HC ICU INTERMEDIATE R&B

## 2020-06-03 PROCEDURE — 99285 EMERGENCY DEPT VISIT HI MDM: CPT

## 2020-06-03 PROCEDURE — 83880 ASSAY OF NATRIURETIC PEPTIDE: CPT

## 2020-06-03 PROCEDURE — 83605 ASSAY OF LACTIC ACID: CPT

## 2020-06-03 PROCEDURE — 87040 BLOOD CULTURE FOR BACTERIA: CPT

## 2020-06-03 RX ORDER — PROMETHAZINE HYDROCHLORIDE 25 MG/1
12.5 TABLET ORAL EVERY 6 HOURS PRN
Status: DISCONTINUED | OUTPATIENT
Start: 2020-06-03 | End: 2020-06-04

## 2020-06-03 RX ORDER — 0.9 % SODIUM CHLORIDE 0.9 %
30 INTRAVENOUS SOLUTION INTRAVENOUS ONCE
Status: COMPLETED | OUTPATIENT
Start: 2020-06-03 | End: 2020-06-03

## 2020-06-03 RX ORDER — DEXTROSE MONOHYDRATE 50 MG/ML
100 INJECTION, SOLUTION INTRAVENOUS PRN
Status: DISCONTINUED | OUTPATIENT
Start: 2020-06-03 | End: 2020-06-07 | Stop reason: HOSPADM

## 2020-06-03 RX ORDER — ASPIRIN 81 MG/1
81 TABLET ORAL DAILY
Status: DISCONTINUED | OUTPATIENT
Start: 2020-06-04 | End: 2020-06-07 | Stop reason: HOSPADM

## 2020-06-03 RX ORDER — SODIUM CHLORIDE 9 MG/ML
INJECTION, SOLUTION INTRAVENOUS CONTINUOUS
Status: DISCONTINUED | OUTPATIENT
Start: 2020-06-03 | End: 2020-06-04

## 2020-06-03 RX ORDER — DEXTROSE MONOHYDRATE 25 G/50ML
12.5 INJECTION, SOLUTION INTRAVENOUS PRN
Status: DISCONTINUED | OUTPATIENT
Start: 2020-06-03 | End: 2020-06-07 | Stop reason: HOSPADM

## 2020-06-03 RX ORDER — ACETAMINOPHEN 325 MG/1
650 TABLET ORAL EVERY 6 HOURS PRN
Status: DISCONTINUED | OUTPATIENT
Start: 2020-06-03 | End: 2020-06-07 | Stop reason: HOSPADM

## 2020-06-03 RX ORDER — BENZTROPINE MESYLATE 1 MG/1
1 TABLET ORAL NIGHTLY
Status: DISCONTINUED | OUTPATIENT
Start: 2020-06-03 | End: 2020-06-07 | Stop reason: HOSPADM

## 2020-06-03 RX ORDER — BUSPIRONE HYDROCHLORIDE 10 MG/1
30 TABLET ORAL 2 TIMES DAILY
Status: DISCONTINUED | OUTPATIENT
Start: 2020-06-03 | End: 2020-06-07 | Stop reason: HOSPADM

## 2020-06-03 RX ORDER — ONDANSETRON 2 MG/ML
4 INJECTION INTRAMUSCULAR; INTRAVENOUS EVERY 6 HOURS PRN
Status: DISCONTINUED | OUTPATIENT
Start: 2020-06-03 | End: 2020-06-04

## 2020-06-03 RX ORDER — ARIPIPRAZOLE 10 MG/1
10 TABLET ORAL DAILY
Status: DISCONTINUED | OUTPATIENT
Start: 2020-06-04 | End: 2020-06-07 | Stop reason: HOSPADM

## 2020-06-03 RX ORDER — POLYETHYLENE GLYCOL 3350 17 G/17G
17 POWDER, FOR SOLUTION ORAL DAILY PRN
Status: DISCONTINUED | OUTPATIENT
Start: 2020-06-03 | End: 2020-06-07 | Stop reason: HOSPADM

## 2020-06-03 RX ORDER — NICOTINE POLACRILEX 4 MG
15 LOZENGE BUCCAL PRN
Status: DISCONTINUED | OUTPATIENT
Start: 2020-06-03 | End: 2020-06-07 | Stop reason: HOSPADM

## 2020-06-03 RX ORDER — MIDODRINE HYDROCHLORIDE 5 MG/1
5 TABLET ORAL 3 TIMES DAILY
Status: DISCONTINUED | OUTPATIENT
Start: 2020-06-03 | End: 2020-06-06

## 2020-06-03 RX ORDER — BUPRENORPHINE AND NALOXONE 8; 2 MG/1; MG/1
1 FILM, SOLUBLE BUCCAL; SUBLINGUAL 2 TIMES DAILY
Status: DISCONTINUED | OUTPATIENT
Start: 2020-06-03 | End: 2020-06-07 | Stop reason: HOSPADM

## 2020-06-03 RX ORDER — SODIUM CHLORIDE 0.9 % (FLUSH) 0.9 %
10 SYRINGE (ML) INJECTION EVERY 12 HOURS SCHEDULED
Status: DISCONTINUED | OUTPATIENT
Start: 2020-06-03 | End: 2020-06-07 | Stop reason: HOSPADM

## 2020-06-03 RX ORDER — ALBUTEROL SULFATE 2.5 MG/3ML
2.5 SOLUTION RESPIRATORY (INHALATION) EVERY 6 HOURS PRN
Status: DISCONTINUED | OUTPATIENT
Start: 2020-06-03 | End: 2020-06-07 | Stop reason: HOSPADM

## 2020-06-03 RX ORDER — CLOPIDOGREL BISULFATE 75 MG/1
75 TABLET ORAL DAILY
Status: DISCONTINUED | OUTPATIENT
Start: 2020-06-04 | End: 2020-06-07 | Stop reason: HOSPADM

## 2020-06-03 RX ORDER — PANTOPRAZOLE SODIUM 40 MG/1
40 TABLET, DELAYED RELEASE ORAL 2 TIMES DAILY
Status: DISCONTINUED | OUTPATIENT
Start: 2020-06-04 | End: 2020-06-07 | Stop reason: HOSPADM

## 2020-06-03 RX ORDER — ACETAMINOPHEN 650 MG/1
650 SUPPOSITORY RECTAL EVERY 6 HOURS PRN
Status: DISCONTINUED | OUTPATIENT
Start: 2020-06-03 | End: 2020-06-07 | Stop reason: HOSPADM

## 2020-06-03 RX ORDER — SODIUM CHLORIDE 0.9 % (FLUSH) 0.9 %
10 SYRINGE (ML) INJECTION PRN
Status: DISCONTINUED | OUTPATIENT
Start: 2020-06-03 | End: 2020-06-07 | Stop reason: HOSPADM

## 2020-06-03 RX ORDER — ATORVASTATIN CALCIUM 40 MG/1
80 TABLET, FILM COATED ORAL NIGHTLY
Status: DISCONTINUED | OUTPATIENT
Start: 2020-06-03 | End: 2020-06-07 | Stop reason: HOSPADM

## 2020-06-03 RX ADMIN — VANCOMYCIN HYDROCHLORIDE 2000 MG: 1 INJECTION, POWDER, LYOPHILIZED, FOR SOLUTION INTRAVENOUS at 20:22

## 2020-06-03 RX ADMIN — SODIUM CHLORIDE: 9 INJECTION, SOLUTION INTRAVENOUS at 22:36

## 2020-06-03 RX ADMIN — SODIUM CHLORIDE 1641 ML: 9 INJECTION, SOLUTION INTRAVENOUS at 19:29

## 2020-06-03 RX ADMIN — Medication 10 ML: at 22:39

## 2020-06-03 RX ADMIN — BENZTROPINE MESYLATE 1 MG: 1 TABLET ORAL at 22:39

## 2020-06-03 RX ADMIN — INSULIN LISPRO 2 UNITS: 100 INJECTION, SOLUTION INTRAVENOUS; SUBCUTANEOUS at 22:38

## 2020-06-03 RX ADMIN — ATORVASTATIN CALCIUM 80 MG: 40 TABLET, FILM COATED ORAL at 22:38

## 2020-06-03 RX ADMIN — CEFEPIME 2 G: 2 INJECTION, POWDER, FOR SOLUTION INTRAVENOUS at 19:28

## 2020-06-03 ASSESSMENT — PAIN SCALES - GENERAL: PAINLEVEL_OUTOF10: 6

## 2020-06-03 ASSESSMENT — PAIN DESCRIPTION - ORIENTATION: ORIENTATION: RIGHT;LEFT

## 2020-06-03 ASSESSMENT — PAIN DESCRIPTION - LOCATION: LOCATION: LEG

## 2020-06-03 NOTE — ED PROVIDER NOTES
Except as noted abovein the ROS, all other systems were reviewed and negative.        PAST MEDICAL HISTORY     Past Medical History:   Diagnosis Date    Anxiety and depression     Arthritis     CAD (coronary artery disease) 01/2018    [facility]     CHF (congestive heart failure) (HCC)     Chronic systolic heart failure ([address] 2/21/2020    COPD (chronic obstructive pulmonary disease) (HCC)     Diabetes mellitus (HCC)     GERD (gastroesophageal reflux disease)     Hyperlipidemia     Hypertension     Hypotension     MI (myocardial infarction) ([facility]     Peripheral neuropathy     Peripheral vascular disease ([facility]     Pulmonary HTN ([facility]     Reflux     Sleep apnea     has never done sleep study;does not use CPAP    Wears glasses     for reading         SURGICAL HISTORY     Past Surgical History:   Procedure Laterality Date    ARTERIAL BYPASS SURGRY Left 01/06/2016    Axillary/Subclavian to Brachial Bypass w/reversed SVG    CARDIAC CATHETERIZATION  03/27/2018    Dr. Jerel Handley - at [address] [place]  01/20/2020    Dr. Mariann Hernández      pancreatitis post surgery    CORONARY ANGIOPLASTY WITH STENT PLACEMENT  03/16/2018    MELODY- 3.5 x 38 and 3.5 x 20 to Cx    CORONARY ANGIOPLASTY WITH STENT PLACEMENT  01/03/2018    MELODY- 3.0 x 38 and 2.75 x 26 and 2.75 x 8 and 2.75 x 22 to the LAD    CORONARY ARTERY BYPASS GRAFT N/A 1/27/2020    CORONARY ARTERY BYPASS GRAFTING X 1, ON PUMP BEATING HEART, INTERNAL MAMMARY ARTERY TO LEFT ANTERIOR DESCENDING ARTERY, VAS CATH INSERTION, URI, PLATELET GEL APPLICATION, 5 LEVEL BILATERAL INTERCOSTAL NERVE BLOCK, STERNAL PLATING performed by Jose Del Castillo MD at [address] [place] [address] Right 5/16/2019    fem-pop, performed by Mik Augdelo MD at [address]  02/14/2019    CardioMEMs insertion for CHF    ROTATOR CUFF REPAIR Left 04/22/2016    TONSILLECTOMY      TRANSLUMINAL ONDANSETRON (ZOFRAN) 4 MG TABLET    Take 1 tablet by mouth 3 times daily as needed for Nausea or Vomiting    PANTOPRAZOLE (PROTONIX) 40 MG TABLET    Take 1 tablet by mouth 2 times daily    POTASSIUM CHLORIDE (KLOR-CON M) 20 MEQ EXTENDED RELEASE TABLET    Take 2 tablets by mouth 4 times daily    SPIRONOLACTONE (ALDACTONE) 25 MG TABLET    Take 1 tablet by mouth daily    TIOTROPIUM (SPIRIVA RESPIMAT) 2.5 MCG/ACT AERS INHALER    INHALE 2 PUFFS INTO THE LUNGS DAILY    TORSEMIDE (DEMADEX) 100 MG TABLET    100 mg daily except 50 mg twice weekly (Monday and Thursday).          ALLERGIES     Lisinopril    FAMILYHISTORY       Family History   Problem Relation Age of Onset    Heart Disease Maternal Grandmother     Cancer Mother         lung and brain cancer    Cancer Maternal Aunt         lung and brain cancer          SOCIAL HISTORY       Social History     Socioeconomic History    Marital status: Single     Spouse name: None    Number of children: None    Years of education: None    Highest education level: None   Occupational History    None   Social Needs    Financial resource strain: None    Food insecurity     Worry: None     Inability: None    Transportation needs     Medical: None     Non-medical: None   Tobacco Use    Smoking status: Current Some Day Smoker     Packs/day: 0.50     Years: 30.00     Pack years: 15.00     Types: Cigarettes     Last attempt to quit: 2019     Years since quittin.8    Smokeless tobacco: Never Used    Tobacco comment: trying to quit   Substance and Sexual Activity    Alcohol use: No    Drug use: Never    Sexual activity: Yes     Partners: Male   Lifestyle    Physical activity     Days per week: None     Minutes per session: None    Stress: None   Relationships    Social connections     Talks on phone: None     Gets together: None     Attends Congregation service: None     Active member of club or organization: None     Attends meetings of clubs or organizations: None Vitals:    Vitals:    06/03/20 1801 06/03/20 1855   BP: 100/70 93/60   Pulse: 86 84   Resp: 20 20   Temp: 97.4 °F (36.3 °C)    TempSrc: Oral    SpO2: 96% 96%   Weight: 251 lb (113.9 kg)    Height: 5' 4\" (1.626 m)        Patient was given thefollowing medications:  Medications   0.9 % sodium chloride IV bolus 1,641 mL (has no administration in time range)   cefepime (MAXIPIME) 2 g IVPB minibag (has no administration in time range)   vancomycin (VANCOCIN) 2,000 mg in dextrose 5 % 500 mL IVPB (has no administration in time range)     qSOFA (quick Sepsis-related Organ Failure Assessment) Score    Respiratory rate >= 22/minute:  +0 for no  Altered mentation:  +0 for no  Systolic blood pressure <=387WDAY: +1 for yes    Score: 1. This falls under the following category: Score of 0-1, which indicates a lower risk of bad outcome. SIRS CRITERIA:    Temp >38 C (100.4 F) or <36 C (96.8 F)   NO. Heart Rate >90:   NO.     Resp. Rate >20 or PaCO2 < 32 mmHg:   YES  +1. WBC <4K or >12K  or >10% Bands:   NO. Total:   1     ** Two or more above criteria met? No**     Patient is a 75-year-old female who presents for evaluation of leg pain. On exam she is alert oriented afebrile well-perfused she remains hemodynamically stable throughout her ER stay, she is nontoxic in appearance. She has lower extremity cellulitis with purulent wounds, no identifiable abscess that would benefit from draining in the emergency department. I believe she warrants admission to the hospital for IV antibiotics as she is failed outpatient management. She was given IV fluid resuscitation, sepsis orders initiated however patient is not currently meeting sirs or Q sofa criteria she is not currently septicemic. Additionally, she is mildly hyponatremic however she is also hyperglycemic. BUN and creatinine are elevated worse compared to her baseline BUN especially.   Her troponin is elevated however it is around her baseline and she is not complaining of chest pain, recommend trending. Additionally she has lactic acidosis, recommend trending. All information including ED workup, results, treatment, diagnosis has been reviewed and discussed with ED attending physician and directly discussed with Hospitalist who is the admitting physician. Pt will be admitted in stable condition. Pt advised of admission and is in full agreement. FINAL IMPRESSION      1. Cellulitis of lower extremity, unspecified laterality          DISPOSITION/PLAN   DISPOSITION        PATIENT REFERREDTO:  No follow-up provider specified.     DISCHARGE MEDICATIONS:  New Prescriptions    No medications on file       DISCONTINUED MEDICATIONS:  Discontinued Medications    No medications on file              (Please note that portions ofthis note were completed with a voice recognition program.  Efforts were made to edit the dictations but occasionally words are mis-transcribed.)    CHERIE Al (electronically signed)        Cj Swenson, [address]  06/04/20 [id number]

## 2020-06-04 PROBLEM — I50.23 ACUTE ON CHRONIC SYSTOLIC HEART FAILURE (HCC): Status: ACTIVE | Noted: 2020-02-21

## 2020-06-04 LAB
ALBUMIN SERPL-MCNC: 3.3 G/DL (ref 3.4–5)
ALBUMIN SERPL-MCNC: 3.4 G/DL (ref 3.4–5)
ALP BLD-CCNC: 141 U/L (ref 40–129)
ALT SERPL-CCNC: 10 U/L (ref 10–40)
AMPHETAMINE SCREEN, URINE: NORMAL
ANION GAP SERPL CALCULATED.3IONS-SCNC: 12 MMOL/L (ref 3–16)
ANION GAP SERPL CALCULATED.3IONS-SCNC: 14 MMOL/L (ref 3–16)
AST SERPL-CCNC: 18 U/L (ref 15–37)
BARBITURATE SCREEN URINE: NORMAL
BASOPHILS ABSOLUTE: 0.1 K/UL (ref 0–0.2)
BASOPHILS RELATIVE PERCENT: 0.8 %
BENZODIAZEPINE SCREEN, URINE: NORMAL
BILIRUB SERPL-MCNC: 0.7 MG/DL (ref 0–1)
BILIRUBIN DIRECT: 0.3 MG/DL (ref 0–0.3)
BILIRUBIN URINE: NEGATIVE
BILIRUBIN, INDIRECT: 0.4 MG/DL (ref 0–1)
BLOOD, URINE: NEGATIVE
BUN BLDV-MCNC: 53 MG/DL (ref 7–20)
BUN BLDV-MCNC: 56 MG/DL (ref 7–20)
CALCIUM SERPL-MCNC: 8.9 MG/DL (ref 8.3–10.6)
CALCIUM SERPL-MCNC: 9.2 MG/DL (ref 8.3–10.6)
CANNABINOID SCREEN URINE: NORMAL
CHLORIDE BLD-SCNC: 78 MMOL/L (ref 99–110)
CHLORIDE BLD-SCNC: 84 MMOL/L (ref 99–110)
CLARITY: CLEAR
CO2: 29 MMOL/L (ref 21–32)
CO2: 29 MMOL/L (ref 21–32)
COCAINE METABOLITE SCREEN URINE: NORMAL
COLOR: YELLOW
CREAT SERPL-MCNC: 1.3 MG/DL (ref 0.6–1.1)
CREAT SERPL-MCNC: 1.5 MG/DL (ref 0.6–1.1)
EKG ATRIAL RATE: 86 BPM
EKG DIAGNOSIS: NORMAL
EKG P AXIS: 67 DEGREES
EKG P-R INTERVAL: 214 MS
EKG Q-T INTERVAL: 422 MS
EKG QRS DURATION: 124 MS
EKG QTC CALCULATION (BAZETT): 504 MS
EKG R AXIS: -77 DEGREES
EKG T AXIS: 98 DEGREES
EKG VENTRICULAR RATE: 86 BPM
EOSINOPHILS ABSOLUTE: 0.2 K/UL (ref 0–0.6)
EOSINOPHILS RELATIVE PERCENT: 1.9 %
ESTIMATED AVERAGE GLUCOSE: 159.9 MG/DL
GFR AFRICAN AMERICAN: 43
GFR AFRICAN AMERICAN: 51
GFR NON-AFRICAN AMERICAN: 36
GFR NON-AFRICAN AMERICAN: 42
GLUCOSE BLD-MCNC: 197 MG/DL (ref 70–99)
GLUCOSE BLD-MCNC: 211 MG/DL (ref 70–99)
GLUCOSE BLD-MCNC: 213 MG/DL (ref 70–99)
GLUCOSE BLD-MCNC: 214 MG/DL (ref 70–99)
GLUCOSE BLD-MCNC: 226 MG/DL (ref 70–99)
GLUCOSE BLD-MCNC: 226 MG/DL (ref 70–99)
GLUCOSE URINE: NEGATIVE MG/DL
HBA1C MFR BLD: 7.2 %
HCT VFR BLD CALC: 34 % (ref 36–48)
HEMOGLOBIN: 10.8 G/DL (ref 12–16)
KETONES, URINE: NEGATIVE MG/DL
LACTIC ACID: 1.3 MMOL/L (ref 0.4–2)
LEUKOCYTE ESTERASE, URINE: NEGATIVE
LYMPHOCYTES ABSOLUTE: 0.8 K/UL (ref 1–5.1)
LYMPHOCYTES RELATIVE PERCENT: 10.1 %
Lab: NORMAL
MAGNESIUM: 1.9 MG/DL (ref 1.8–2.4)
MCH RBC QN AUTO: 24.8 PG (ref 26–34)
MCHC RBC AUTO-ENTMCNC: 31.6 G/DL (ref 31–36)
MCV RBC AUTO: 78.4 FL (ref 80–100)
METHADONE SCREEN, URINE: NORMAL
MICROSCOPIC EXAMINATION: NORMAL
MONOCYTES ABSOLUTE: 0.6 K/UL (ref 0–1.3)
MONOCYTES RELATIVE PERCENT: 7.5 %
NEUTROPHILS ABSOLUTE: 6.7 K/UL (ref 1.7–7.7)
NEUTROPHILS RELATIVE PERCENT: 79.7 %
NITRITE, URINE: NEGATIVE
OPIATE SCREEN URINE: NORMAL
OXYCODONE URINE: NORMAL
PDW BLD-RTO: 19.8 % (ref 12.4–15.4)
PERFORMED ON: ABNORMAL
PH UA: 7
PH UA: 7 (ref 5–8)
PHENCYCLIDINE SCREEN URINE: NORMAL
PHOSPHORUS: 3.3 MG/DL (ref 2.5–4.9)
PLATELET # BLD: 400 K/UL (ref 135–450)
PMV BLD AUTO: 8.7 FL (ref 5–10.5)
POTASSIUM REFLEX MAGNESIUM: 3.2 MMOL/L (ref 3.5–5.1)
POTASSIUM SERPL-SCNC: 3 MMOL/L (ref 3.5–5.1)
PROPOXYPHENE SCREEN: NORMAL
PROTEIN UA: NEGATIVE MG/DL
RBC # BLD: 4.34 M/UL (ref 4–5.2)
SODIUM BLD-SCNC: 121 MMOL/L (ref 136–145)
SODIUM BLD-SCNC: 125 MMOL/L (ref 136–145)
SPECIFIC GRAVITY UA: <=1.005 (ref 1–1.03)
TOTAL PROTEIN: 6.8 G/DL (ref 6.4–8.2)
TROPONIN: <0.01 NG/ML
URINE CULTURE, ROUTINE: NORMAL
URINE TYPE: NORMAL
UROBILINOGEN, URINE: 0.2 E.U./DL
WBC # BLD: 8.4 K/UL (ref 4–11)

## 2020-06-04 PROCEDURE — 80069 RENAL FUNCTION PANEL: CPT

## 2020-06-04 PROCEDURE — 83735 ASSAY OF MAGNESIUM: CPT

## 2020-06-04 PROCEDURE — 6360000002 HC RX W HCPCS: Performed by: HOSPITALIST

## 2020-06-04 PROCEDURE — 85025 COMPLETE CBC W/AUTO DIFF WBC: CPT

## 2020-06-04 PROCEDURE — 6370000000 HC RX 637 (ALT 250 FOR IP): Performed by: HOSPITALIST

## 2020-06-04 PROCEDURE — 80307 DRUG TEST PRSMV CHEM ANLYZR: CPT

## 2020-06-04 PROCEDURE — 2060000000 HC ICU INTERMEDIATE R&B

## 2020-06-04 PROCEDURE — 6370000000 HC RX 637 (ALT 250 FOR IP): Performed by: INTERNAL MEDICINE

## 2020-06-04 PROCEDURE — 93010 ELECTROCARDIOGRAM REPORT: CPT | Performed by: INTERNAL MEDICINE

## 2020-06-04 PROCEDURE — 83605 ASSAY OF LACTIC ACID: CPT

## 2020-06-04 PROCEDURE — 80076 HEPATIC FUNCTION PANEL: CPT

## 2020-06-04 PROCEDURE — 84484 ASSAY OF TROPONIN QUANT: CPT

## 2020-06-04 PROCEDURE — 6360000002 HC RX W HCPCS: Performed by: INTERNAL MEDICINE

## 2020-06-04 PROCEDURE — 2580000003 HC RX 258: Performed by: INTERNAL MEDICINE

## 2020-06-04 PROCEDURE — 81003 URINALYSIS AUTO W/O SCOPE: CPT

## 2020-06-04 PROCEDURE — 87086 URINE CULTURE/COLONY COUNT: CPT

## 2020-06-04 PROCEDURE — 6370000000 HC RX 637 (ALT 250 FOR IP): Performed by: PHYSICIAN ASSISTANT

## 2020-06-04 PROCEDURE — 36415 COLL VENOUS BLD VENIPUNCTURE: CPT

## 2020-06-04 PROCEDURE — 2580000003 HC RX 258: Performed by: HOSPITALIST

## 2020-06-04 PROCEDURE — 99233 SBSQ HOSP IP/OBS HIGH 50: CPT | Performed by: INTERNAL MEDICINE

## 2020-06-04 RX ORDER — POTASSIUM CHLORIDE 20 MEQ/1
20 TABLET, EXTENDED RELEASE ORAL 4 TIMES DAILY
Status: DISCONTINUED | OUTPATIENT
Start: 2020-06-04 | End: 2020-06-04

## 2020-06-04 RX ORDER — POTASSIUM CHLORIDE 20 MEQ/1
40 TABLET, EXTENDED RELEASE ORAL
Status: COMPLETED | OUTPATIENT
Start: 2020-06-04 | End: 2020-06-04

## 2020-06-04 RX ORDER — FUROSEMIDE 10 MG/ML
40 INJECTION INTRAMUSCULAR; INTRAVENOUS 2 TIMES DAILY
Status: DISCONTINUED | OUTPATIENT
Start: 2020-06-04 | End: 2020-06-04

## 2020-06-04 RX ORDER — PROCHLORPERAZINE EDISYLATE 5 MG/ML
10 INJECTION INTRAMUSCULAR; INTRAVENOUS EVERY 6 HOURS PRN
Status: DISCONTINUED | OUTPATIENT
Start: 2020-06-04 | End: 2020-06-07 | Stop reason: HOSPADM

## 2020-06-04 RX ADMIN — INSULIN LISPRO 2 UNITS: 100 INJECTION, SOLUTION INTRAVENOUS; SUBCUTANEOUS at 17:27

## 2020-06-04 RX ADMIN — Medication 10 ML: at 21:59

## 2020-06-04 RX ADMIN — CEFEPIME 2 G: 2 INJECTION, POWDER, FOR SOLUTION INTRAVENOUS at 21:58

## 2020-06-04 RX ADMIN — POTASSIUM CHLORIDE 40 MEQ: 1500 TABLET, EXTENDED RELEASE ORAL at 17:26

## 2020-06-04 RX ADMIN — SODIUM CHLORIDE: 9 INJECTION, SOLUTION INTRAVENOUS at 09:28

## 2020-06-04 RX ADMIN — FUROSEMIDE 40 MG: 10 INJECTION, SOLUTION INTRAMUSCULAR; INTRAVENOUS at 11:48

## 2020-06-04 RX ADMIN — BUPRENORPHINE HYDROCHLORIDE, NALOXONE HYDROCHLORIDE 1 FILM: 8; 2 FILM, SOLUBLE BUCCAL; SUBLINGUAL at 09:20

## 2020-06-04 RX ADMIN — BUSPIRONE HYDROCHLORIDE 30 MG: 10 TABLET ORAL at 21:57

## 2020-06-04 RX ADMIN — BUPRENORPHINE HYDROCHLORIDE, NALOXONE HYDROCHLORIDE 1 FILM: 8; 2 FILM, SOLUBLE BUCCAL; SUBLINGUAL at 21:58

## 2020-06-04 RX ADMIN — POTASSIUM CHLORIDE 40 MEQ: 1500 TABLET, EXTENDED RELEASE ORAL at 18:58

## 2020-06-04 RX ADMIN — MIDODRINE HYDROCHLORIDE 5 MG: 5 TABLET ORAL at 15:30

## 2020-06-04 RX ADMIN — MIDODRINE HYDROCHLORIDE 5 MG: 5 TABLET ORAL at 21:58

## 2020-06-04 RX ADMIN — LAMOTRIGINE 150 MG: 100 TABLET ORAL at 21:58

## 2020-06-04 RX ADMIN — ASPIRIN 81 MG: 81 TABLET, COATED ORAL at 09:20

## 2020-06-04 RX ADMIN — INSULIN LISPRO 1 UNITS: 100 INJECTION, SOLUTION INTRAVENOUS; SUBCUTANEOUS at 22:02

## 2020-06-04 RX ADMIN — BUSPIRONE HYDROCHLORIDE 30 MG: 10 TABLET ORAL at 09:20

## 2020-06-04 RX ADMIN — ENOXAPARIN SODIUM 40 MG: 40 INJECTION SUBCUTANEOUS at 09:21

## 2020-06-04 RX ADMIN — MIDODRINE HYDROCHLORIDE 5 MG: 5 TABLET ORAL at 09:20

## 2020-06-04 RX ADMIN — PANTOPRAZOLE SODIUM 40 MG: 40 TABLET, DELAYED RELEASE ORAL at 15:30

## 2020-06-04 RX ADMIN — BENZTROPINE MESYLATE 1 MG: 1 TABLET ORAL at 21:58

## 2020-06-04 RX ADMIN — LAMOTRIGINE 150 MG: 100 TABLET ORAL at 09:20

## 2020-06-04 RX ADMIN — TIOTROPIUM BROMIDE INHALATION SPRAY 2 PUFF: 3.12 SPRAY, METERED RESPIRATORY (INHALATION) at 06:49

## 2020-06-04 RX ADMIN — ACETAMINOPHEN 650 MG: 325 TABLET ORAL at 15:30

## 2020-06-04 RX ADMIN — FUROSEMIDE 10 MG/HR: 10 INJECTION, SOLUTION INTRAVENOUS at 12:50

## 2020-06-04 RX ADMIN — PANTOPRAZOLE SODIUM 40 MG: 40 TABLET, DELAYED RELEASE ORAL at 09:19

## 2020-06-04 RX ADMIN — CEFEPIME 2 G: 2 INJECTION, POWDER, FOR SOLUTION INTRAVENOUS at 09:21

## 2020-06-04 RX ADMIN — POTASSIUM CHLORIDE 20 MEQ: 1500 TABLET, EXTENDED RELEASE ORAL at 11:48

## 2020-06-04 RX ADMIN — INSULIN LISPRO 2 UNITS: 100 INJECTION, SOLUTION INTRAVENOUS; SUBCUTANEOUS at 11:48

## 2020-06-04 RX ADMIN — CLOPIDOGREL BISULFATE 75 MG: 75 TABLET ORAL at 09:20

## 2020-06-04 RX ADMIN — ATORVASTATIN CALCIUM 80 MG: 40 TABLET, FILM COATED ORAL at 21:58

## 2020-06-04 RX ADMIN — INSULIN LISPRO 1 UNITS: 100 INJECTION, SOLUTION INTRAVENOUS; SUBCUTANEOUS at 09:22

## 2020-06-04 RX ADMIN — POTASSIUM CHLORIDE 20 MEQ: 1500 TABLET, EXTENDED RELEASE ORAL at 15:30

## 2020-06-04 ASSESSMENT — PAIN DESCRIPTION - PROGRESSION: CLINICAL_PROGRESSION: GRADUALLY WORSENING

## 2020-06-04 ASSESSMENT — PAIN DESCRIPTION - ONSET: ONSET: ON-GOING

## 2020-06-04 ASSESSMENT — PAIN DESCRIPTION - DESCRIPTORS
DESCRIPTORS: ACHING
DESCRIPTORS: ACHING;SORE

## 2020-06-04 ASSESSMENT — PAIN SCALES - GENERAL
PAINLEVEL_OUTOF10: 5
PAINLEVEL_OUTOF10: 8
PAINLEVEL_OUTOF10: 3

## 2020-06-04 ASSESSMENT — PAIN DESCRIPTION - ORIENTATION: ORIENTATION: RIGHT;LEFT

## 2020-06-04 ASSESSMENT — PAIN DESCRIPTION - LOCATION
LOCATION: LEG
LOCATION: LEG

## 2020-06-04 ASSESSMENT — PAIN DESCRIPTION - PAIN TYPE
TYPE: ACUTE PAIN
TYPE: ACUTE PAIN

## 2020-06-04 ASSESSMENT — PAIN DESCRIPTION - FREQUENCY: FREQUENCY: CONTINUOUS

## 2020-06-04 NOTE — CONSULTS
[facility]  Consult Note           Reason for Consult:  Hyponatremia  Requesting Physician:  Dr. Inna Carias    Chief Complaint:  Worsening cellulitis  History Obtained From:  patient, electronic medical record    History of Present Ilness:    62year old female with sCHF, CKD, CAD, DM admitted with worsening cellulitis of LE's. We have been asked to assist in further mgmt of her hyponatremia. States she has gained ~12 pounds over last 10 days with worsening LE edema, erythema of LE's. Tried taking metolazone without improvement. Feels more sob, denies any chest pain, abdominal pain, n/v/d.     Past Medical History:        Diagnosis Date    Anxiety and depression     Arthritis     CAD (coronary artery disease) 01/2018    Cardiomyopathy ([address]     [facility]     Chronic systolic heart failure ([address] 2/21/2020    COPD (chronic obstructive pulmonary disease) (HCC)     Diabetes mellitus (HCC)     GERD (gastroesophageal reflux disease)     Hyperlipidemia     Hypertension     Hypotension     MI (myocardial infarction) ([address]     Peripheral neuropathy     Peripheral vascular disease ([address]     Pulmonary HTN ([address]     Reflux     Sleep apnea     has never done sleep study;does not use CPAP    Wears glasses     for reading       Past Surgical History:        Procedure Laterality Date    ARTERIAL BYPASS SURGRY Left 01/06/2016    Axillary/Subclavian to Brachial Bypass w/reversed SVG    CARDIAC CATHETERIZATION  03/27/2018    Dr. Melita Aceves - at [address] [facility]  01/20/2020    Dr. Pavel Bruce      pancreatitis post surgery    CORONARY ANGIOPLASTY WITH STENT PLACEMENT  03/16/2018    MELODY- 3.5 x 38 and 3.5 x 20 to Cx    CORONARY ANGIOPLASTY WITH STENT PLACEMENT  01/03/2018    MELODY- 3.0 x 38 and 2.75 x 26 and 2.75 x 8 and 2.75 x 22 to the LAD    CORONARY ARTERY BYPASS GRAFT N/A 1/27/2020    CORONARY ARTERY  benztropine (COGENTIN) 1 MG tablet Take 1 mg by mouth nightly       Liraglutide (VICTOZA) 18 MG/3ML SOPN SC injection Inject 1.2 mg into the skin daily 2 pen 3    pantoprazole (PROTONIX) 40 MG tablet Take 1 tablet by mouth 2 times daily 60 tablet 0    clopidogrel (PLAVIX) 75 MG tablet TAKE 1 TABLET BY MOUTH EVERY DAY 30 tablet 5    magnesium oxide (MAG-OX) 400 (240 Mg) MG tablet TAKE 1 TABLET ONCE DAILY 30 tablet 11    busPIRone (BUSPAR) 30 MG tablet Take 30 mg by mouth 2 times daily       aspirin EC 81 MG EC tablet Take 1 tablet by mouth daily 30 tablet 3    ARIPiprazole (ABILIFY) 10 MG tablet Take 10 mg by mouth daily       lamoTRIgine (LAMICTAL) 150 MG tablet Take 150 mg by mouth 2 times daily       tiotropium (SPIRIVA RESPIMAT) 2.5 MCG/ACT AERS inhaler INHALE 2 PUFFS INTO THE LUNGS DAILY 1 Inhaler 6    albuterol sulfate  (90 Base) MCG/ACT inhaler Inhale 2 puffs into the lungs every 6 hours as needed for Wheezing or Shortness of Breath      blood glucose test strips (EXACTECH TEST) strip 6 times daily.  200 strip 6    Insulin Pen Needle (B-D ULTRAFINE III SHORT PEN) 31G X 8 MM MISC Five shots a day 200 each 2    INSULIN SYRINGE .5CC/29G 29G X 1/2\" 0.5 ML MISC 1 each by Does not apply route daily 100 each 3       Allergies:  Lisinopril    Social History:    Social History     Socioeconomic History    Marital status: Single     Spouse name: Not on file    Number of children: Not on file    Years of education: Not on file    Highest education level: Not on file   Occupational History    Not on file   Social Needs    Financial resource strain: Not on file    Food insecurity     Worry: Not on file     Inability: Not on file    Transportation needs     Medical: Not on file     Non-medical: Not on file   Tobacco Use    Smoking status: Current Some Day Smoker     Packs/day: 0.50     Years: 30.00     Pack years: 15.00     Types: Cigarettes     Last attempt to quit: 8/1/2019     Years since quittin.8    Smokeless tobacco: Never Used    Tobacco comment: trying to quit   Substance and Sexual Activity    Alcohol use: No    Drug use: Never    Sexual activity: Yes     Partners: Male   Lifestyle    Physical activity     Days per week: Not on file     Minutes per session: Not on file    Stress: Not on file   Relationships    Social connections     Talks on phone: Not on file     Gets together: Not on file     Attends Buddhism service: Not on file     Active member of club or organization: Not on file     Attends meetings of clubs or organizations: Not on file     Relationship status: Not on file    Intimate partner violence     Fear of current or ex partner: Not on file     Emotionally abused: Not on file     Physically abused: Not on file     Forced sexual activity: Not on file   Other Topics Concern    Not on file   Social History Narrative    Not on file       Family History:   Family History   Problem Relation Age of Onset    Heart Disease Maternal Grandmother     Cancer Mother         lung and brain cancer    Cancer Maternal Aunt         lung and brain cancer       Review of Systems:   Pertinent positives stated above in HPI. Remainder of 10 point review of systems were reviewed and were negative.     Physical exam:   Constitutional:  VITALS:  /73   Pulse 89   Temp 97.9 °F (36.6 °C) (Oral)   Resp 16   Ht 5' 4.5\" (1.638 m)   Wt 252 lb 6.4 oz (114.5 kg)   LMP 10/24/2012   SpO2 96%   BMI 42.66 kg/m²   Gen: alert, awake, nad  Skin: no rash, turgor wnl  Heent:  eomi, mmm  Neck: no bruits or jvd noted, thyroid normal  Cardiovascular:  S1, S2 without m/r/g  Respiratory: CTA B without w/r/r; respiratory effort normal  Abdomen:  +bs, soft, nt, nd, no hepatosplenomegaly  Ext: ++ lower extremity edema with erythema  Psychiatric: mood and affect appropriate; judgement and insight intact  Musculoskeletal:  Rom, muscular strength limited; digits, nails normal    Data/  CBC:   Lab Results   Component Value Date    WBC 8.4 06/04/2020    RBC 4.34 06/04/2020    HGB 10.8 06/04/2020    HCT 34.0 06/04/2020    MCV 78.4 06/04/2020    MCH 24.8 06/04/2020    MCHC 31.6 06/04/2020    RDW 19.8 06/04/2020     06/04/2020    MPV 8.7 06/04/2020     BMP:    Lab Results   Component Value Date     06/04/2020    K 3.2 06/04/2020    CL 78 06/04/2020    CO2 29 06/04/2020    BUN 56 06/04/2020    LABALBU 3.3 06/04/2020    CREATININE 1.5 06/04/2020    CALCIUM 8.9 06/04/2020    GFRAA 43 06/04/2020    LABGLOM 36 06/04/2020    GLUCOSE 211 06/04/2020         Assessment/    - Chronic kidney disease stage 3 - Baseline SCr 1.5-1.7    - Hyponatremia in setting of decompensated CHF & metolazone use    Na previously in low to mid 130 range, now at 121    - CHF - EF 35-40%, moderate to severe MR    - LE cellulitis - plans per Admitting    - Hypotension - on scheduled midodrine as outpatient    - Hypokalemia - prn replacement      Plan/    - Start lasix infusion 10 mg/hour - has responded well to this during prior admissions  - Continue midodrine for BP support while diuresing  - Trend labs [id number]    Thank you for the consultation. Please do not hesitate to call with questions.     [employer]

## 2020-06-04 NOTE — H&P
Hospital Medicine History & Physical      PCP: Lisa Cantrell PA-C    Date of Admission: 6/3/2020    Date of Service: Pt seen/examined on 6/3/2020 and Admitted to Inpatient with expected LOS greater than two midnights due to medical therapy. Chief Complaint:  Worsening cellulitis      History Of Present Illness:       62 y.o. female with cellulitis bilateral lower extremities progressive despite starting outpatient clindamycin. Denies fever, chills, lightheaddeness, dizziness. Denies abdominal pain, n/v/diarrhea. Denies chest pain, sob. Currently in no distress, awake, alert, oriented.     Past Medical History:          Diagnosis Date    Anxiety and depression     Arthritis     CAD (coronary artery disease) 01/2018    Cardiomyopathy ([address]     [facility]     Chronic systolic heart failure ([address] 2/21/2020    COPD [facility]     Diabetes mellitus ([facility]     GERD (gastroesophageal reflux disease)     Hyperlipidemia     Hypertension     Hypotension     MI (myocardial infarction) ([address]     Peripheral neuropathy     Peripheral vascular disease ([address]     Pulmonary HTN ([address]     Reflux     Sleep apnea     has never done sleep study;does not use CPAP    Wears glasses     for reading       Past Surgical History:          Procedure Laterality Date    ARTERIAL BYPASS SURGRY Left 01/06/2016    Axillary/Subclavian to Brachial Bypass w/reversed SVG    CARDIAC CATHETERIZATION  03/27/2018    Dr. Claudetta Rook - at [address] [facility]  01/20/2020    Dr. Ann Alberts      pancreatitis post surgery    CORONARY ANGIOPLASTY WITH STENT PLACEMENT  03/16/2018    MELODY- 3.5 x 38 and 3.5 x 20 to Cx    CORONARY ANGIOPLASTY WITH STENT PLACEMENT  01/03/2018    MELODY- 3.0 x 38 and 2.75 x 26 and 2.75 x 8 and 2.75 x 22 to the LAD    CORONARY ARTERY BYPASS GRAFT N/A 1/27/2020    CORONARY ARTERY BYPASS GRAFTING for input(s): INR in the last 72 hours. Recent Labs     06/03/20  1820   TROPONINI 0.03*       Urinalysis:      Lab Results   Component Value Date    NITRU Negative 03/10/2020    WBCUA 3-5 09/28/2019    BACTERIA Rare 09/28/2019    RBCUA None seen 09/28/2019    BLOODU Negative 03/10/2020    SPECGRAV 1.010 03/10/2020    GLUCOSEU Negative 03/10/2020       Radiology:          XR CHEST STANDARD (2 VW)   Final Result   Stable pattern of cardiomegaly with no acute finding in the chest.             ASSESSMENT:    Active Hospital Problems    Diagnosis Date Noted    Cellulitis [L03.90] 06/03/2020         PLAN:    1) Cellulitis  - failed outpatient management with clindamycin  - trend lactic acid, sepsis protocol IVF bolus given  - vanc/zosyn, follow up blood cultures    2) DM  - corrective ISS    3) SHAUNNA  - BP in low 100's  - holding diuretics (x3)  - check urinalysis, most likely pre renal    4) CAD  - trend troponin, no chest pain    5) CHF  - denies sob, follow I/O closely, EF 35-40%  DVT Prophylaxis: lovenox  Diet: No diet orders on file  Code Status: Prior       German Cohn MD    Thank you Blue Mancia PA-C for the opportunity to be involved in this patient's care. If you have any questions or concerns please feel free to contact me at [phone].

## 2020-06-04 NOTE — ED NOTES
Pt care transferred to [facility] team, all questions addressed. Pt absent of acute cardiorespiratory/neurological distress @ this time.       [facility] Yoseph, RN  06/03/20 [id number]

## 2020-06-04 NOTE — CARE COORDINATION
Kvng Zamora for Suboxone for alcoholism ( has not drank since 2006)  [facility], [facility]: No    W[facility] Clinic: No     COVID SCREENING: No       Other: The Plan for Transition of Care is related to the following treatment goals: home    The Patient and/or patient representative pt was provided with a choice of provider and agrees   with the discharge plan. [x] Yes [] No    Freedom of choice list was provided with basic dialogue that supports the patient's individualized plan of care/goals, treatment preferences and shares the quality data associated with the providers. [x] Yes [] No    DISCHARGE PLAN: Reviewed chart. Role of discharge planner explained and patient verbalized understanding. Pt states that she is from a  mobile home with friend and daughter and plans to return. Pt states that she is disabled due to heart disease and vascular disease. Pt states that she is active with a pain management clinic in [facility] called [facility] for suboxone d/t alcoholism. Pt states that she has not drank since 2006. Follow for possible abx. Explained Case Management role/services.

## 2020-06-05 LAB
ALBUMIN SERPL-MCNC: 3.6 G/DL (ref 3.4–5)
ANION GAP SERPL CALCULATED.3IONS-SCNC: 12 MMOL/L (ref 3–16)
BUN BLDV-MCNC: 53 MG/DL (ref 7–20)
CALCIUM SERPL-MCNC: 9.4 MG/DL (ref 8.3–10.6)
CHLORIDE BLD-SCNC: 84 MMOL/L (ref 99–110)
CO2: 29 MMOL/L (ref 21–32)
CREAT SERPL-MCNC: 1.5 MG/DL (ref 0.6–1.1)
GFR AFRICAN AMERICAN: 43
GFR NON-AFRICAN AMERICAN: 36
GLUCOSE BLD-MCNC: 195 MG/DL (ref 70–99)
GLUCOSE BLD-MCNC: 224 MG/DL (ref 70–99)
GLUCOSE BLD-MCNC: 226 MG/DL (ref 70–99)
GLUCOSE BLD-MCNC: 233 MG/DL (ref 70–99)
GLUCOSE BLD-MCNC: 258 MG/DL (ref 70–99)
GLUCOSE BLD-MCNC: 306 MG/DL (ref 70–99)
PERFORMED ON: ABNORMAL
PHOSPHORUS: 3 MG/DL (ref 2.5–4.9)
POTASSIUM SERPL-SCNC: 3.7 MMOL/L (ref 3.5–5.1)
SODIUM BLD-SCNC: 125 MMOL/L (ref 136–145)

## 2020-06-05 PROCEDURE — 94761 N-INVAS EAR/PLS OXIMETRY MLT: CPT

## 2020-06-05 PROCEDURE — 2580000003 HC RX 258: Performed by: INTERNAL MEDICINE

## 2020-06-05 PROCEDURE — 6360000002 HC RX W HCPCS: Performed by: HOSPITALIST

## 2020-06-05 PROCEDURE — 6370000000 HC RX 637 (ALT 250 FOR IP): Performed by: HOSPITALIST

## 2020-06-05 PROCEDURE — 6370000000 HC RX 637 (ALT 250 FOR IP): Performed by: INTERNAL MEDICINE

## 2020-06-05 PROCEDURE — 99232 SBSQ HOSP IP/OBS MODERATE 35: CPT | Performed by: INTERNAL MEDICINE

## 2020-06-05 PROCEDURE — 6360000002 HC RX W HCPCS: Performed by: INTERNAL MEDICINE

## 2020-06-05 PROCEDURE — 2060000000 HC ICU INTERMEDIATE R&B

## 2020-06-05 PROCEDURE — 2580000003 HC RX 258: Performed by: HOSPITALIST

## 2020-06-05 PROCEDURE — 80069 RENAL FUNCTION PANEL: CPT

## 2020-06-05 PROCEDURE — 94640 AIRWAY INHALATION TREATMENT: CPT

## 2020-06-05 PROCEDURE — 36415 COLL VENOUS BLD VENIPUNCTURE: CPT

## 2020-06-05 RX ORDER — POTASSIUM CHLORIDE 20 MEQ/1
20 TABLET, EXTENDED RELEASE ORAL 2 TIMES DAILY WITH MEALS
Status: COMPLETED | OUTPATIENT
Start: 2020-06-05 | End: 2020-06-06

## 2020-06-05 RX ADMIN — INSULIN LISPRO 2 UNITS: 100 INJECTION, SOLUTION INTRAVENOUS; SUBCUTANEOUS at 22:15

## 2020-06-05 RX ADMIN — ATORVASTATIN CALCIUM 80 MG: 40 TABLET, FILM COATED ORAL at 23:05

## 2020-06-05 RX ADMIN — MIDODRINE HYDROCHLORIDE 5 MG: 5 TABLET ORAL at 13:42

## 2020-06-05 RX ADMIN — CLOPIDOGREL BISULFATE 75 MG: 75 TABLET ORAL at 10:38

## 2020-06-05 RX ADMIN — ENOXAPARIN SODIUM 40 MG: 40 INJECTION SUBCUTANEOUS at 10:37

## 2020-06-05 RX ADMIN — FUROSEMIDE 10 MG/HR: 10 INJECTION, SOLUTION INTRAVENOUS at 07:46

## 2020-06-05 RX ADMIN — BUSPIRONE HYDROCHLORIDE 30 MG: 10 TABLET ORAL at 10:38

## 2020-06-05 RX ADMIN — PANTOPRAZOLE SODIUM 40 MG: 40 TABLET, DELAYED RELEASE ORAL at 17:35

## 2020-06-05 RX ADMIN — POTASSIUM CHLORIDE 20 MEQ: 1500 TABLET, EXTENDED RELEASE ORAL at 13:42

## 2020-06-05 RX ADMIN — LAMOTRIGINE 150 MG: 100 TABLET ORAL at 10:39

## 2020-06-05 RX ADMIN — ACETAMINOPHEN 650 MG: 325 TABLET ORAL at 10:52

## 2020-06-05 RX ADMIN — PANTOPRAZOLE SODIUM 40 MG: 40 TABLET, DELAYED RELEASE ORAL at 10:39

## 2020-06-05 RX ADMIN — INSULIN LISPRO 2 UNITS: 100 INJECTION, SOLUTION INTRAVENOUS; SUBCUTANEOUS at 10:06

## 2020-06-05 RX ADMIN — BUPRENORPHINE HYDROCHLORIDE, NALOXONE HYDROCHLORIDE 1 FILM: 8; 2 FILM, SOLUBLE BUCCAL; SUBLINGUAL at 23:06

## 2020-06-05 RX ADMIN — BENZTROPINE MESYLATE 1 MG: 1 TABLET ORAL at 22:15

## 2020-06-05 RX ADMIN — Medication 10 ML: at 10:46

## 2020-06-05 RX ADMIN — Medication 10 ML: at 22:15

## 2020-06-05 RX ADMIN — BUSPIRONE HYDROCHLORIDE 30 MG: 10 TABLET ORAL at 22:15

## 2020-06-05 RX ADMIN — MIDODRINE HYDROCHLORIDE 5 MG: 5 TABLET ORAL at 10:40

## 2020-06-05 RX ADMIN — TIOTROPIUM BROMIDE INHALATION SPRAY 2 PUFF: 3.12 SPRAY, METERED RESPIRATORY (INHALATION) at 07:09

## 2020-06-05 RX ADMIN — BUPRENORPHINE HYDROCHLORIDE, NALOXONE HYDROCHLORIDE 1 FILM: 8; 2 FILM, SOLUBLE BUCCAL; SUBLINGUAL at 10:39

## 2020-06-05 RX ADMIN — INSULIN LISPRO 1 UNITS: 100 INJECTION, SOLUTION INTRAVENOUS; SUBCUTANEOUS at 13:43

## 2020-06-05 RX ADMIN — LAMOTRIGINE 150 MG: 100 TABLET ORAL at 22:15

## 2020-06-05 RX ADMIN — MIDODRINE HYDROCHLORIDE 5 MG: 5 TABLET ORAL at 22:15

## 2020-06-05 RX ADMIN — ASPIRIN 81 MG: 81 TABLET, COATED ORAL at 10:39

## 2020-06-05 RX ADMIN — CEFEPIME 2 G: 2 INJECTION, POWDER, FOR SOLUTION INTRAVENOUS at 22:15

## 2020-06-05 RX ADMIN — CEFEPIME 2 G: 2 INJECTION, POWDER, FOR SOLUTION INTRAVENOUS at 10:38

## 2020-06-05 RX ADMIN — INSULIN LISPRO 3 UNITS: 100 INJECTION, SOLUTION INTRAVENOUS; SUBCUTANEOUS at 17:35

## 2020-06-05 ASSESSMENT — PAIN SCALES - GENERAL
PAINLEVEL_OUTOF10: 0
PAINLEVEL_OUTOF10: 6

## 2020-06-05 NOTE — FLOWSHEET NOTE
06/04/20 2152   Vital Signs   Temp 96.3 °F (35.7 °C)   Temp Source Oral   Pulse 84   Heart Rate Source Monitor   Resp 18   /73   Level of Consciousness 0   MEWS Score 1   Pain Assessment   Pain Assessment 0-10   Pain Level 5   Pain Location Leg   Pain Type Acute pain   Pain Descriptors Aching   Oxygen Therapy   SpO2 97 %   O2 Device None (Room air)   Pt. Resting in bed. Call light in reach. Shift assessment completed see flow sheet. Denies any needs at this time. Will continue to monitor.

## 2020-06-06 LAB
ALBUMIN SERPL-MCNC: 3.5 G/DL (ref 3.4–5)
ANION GAP SERPL CALCULATED.3IONS-SCNC: 17 MMOL/L (ref 3–16)
BUN BLDV-MCNC: 48 MG/DL (ref 7–20)
CALCIUM SERPL-MCNC: 9.3 MG/DL (ref 8.3–10.6)
CHLORIDE BLD-SCNC: 82 MMOL/L (ref 99–110)
CO2: 28 MMOL/L (ref 21–32)
CREAT SERPL-MCNC: 1.4 MG/DL (ref 0.6–1.1)
GFR AFRICAN AMERICAN: 47
GFR NON-AFRICAN AMERICAN: 39
GLUCOSE BLD-MCNC: 255 MG/DL (ref 70–99)
GLUCOSE BLD-MCNC: 275 MG/DL (ref 70–99)
GLUCOSE BLD-MCNC: 280 MG/DL (ref 70–99)
GLUCOSE BLD-MCNC: 286 MG/DL (ref 70–99)
GLUCOSE BLD-MCNC: 294 MG/DL (ref 70–99)
PERFORMED ON: ABNORMAL
PHOSPHORUS: 3.1 MG/DL (ref 2.5–4.9)
POTASSIUM SERPL-SCNC: 2.9 MMOL/L (ref 3.5–5.1)
SODIUM BLD-SCNC: 127 MMOL/L (ref 136–145)

## 2020-06-06 PROCEDURE — 6370000000 HC RX 637 (ALT 250 FOR IP): Performed by: INTERNAL MEDICINE

## 2020-06-06 PROCEDURE — 94640 AIRWAY INHALATION TREATMENT: CPT

## 2020-06-06 PROCEDURE — 80069 RENAL FUNCTION PANEL: CPT

## 2020-06-06 PROCEDURE — 6370000000 HC RX 637 (ALT 250 FOR IP): Performed by: HOSPITALIST

## 2020-06-06 PROCEDURE — 99232 SBSQ HOSP IP/OBS MODERATE 35: CPT | Performed by: INTERNAL MEDICINE

## 2020-06-06 PROCEDURE — 2580000003 HC RX 258: Performed by: INTERNAL MEDICINE

## 2020-06-06 PROCEDURE — 36415 COLL VENOUS BLD VENIPUNCTURE: CPT

## 2020-06-06 PROCEDURE — 6360000002 HC RX W HCPCS: Performed by: INTERNAL MEDICINE

## 2020-06-06 PROCEDURE — 2060000000 HC ICU INTERMEDIATE R&B

## 2020-06-06 PROCEDURE — 6360000002 HC RX W HCPCS: Performed by: HOSPITALIST

## 2020-06-06 PROCEDURE — 2580000003 HC RX 258: Performed by: HOSPITALIST

## 2020-06-06 RX ORDER — POTASSIUM CHLORIDE 7.45 MG/ML
40 INJECTION INTRAVENOUS ONCE
Status: DISCONTINUED | OUTPATIENT
Start: 2020-06-06 | End: 2020-06-06

## 2020-06-06 RX ORDER — POTASSIUM CHLORIDE 20 MEQ/1
40 TABLET, EXTENDED RELEASE ORAL
Status: COMPLETED | OUTPATIENT
Start: 2020-06-06 | End: 2020-06-06

## 2020-06-06 RX ORDER — POTASSIUM CHLORIDE 750 MG/1
40 TABLET, EXTENDED RELEASE ORAL ONCE
Status: COMPLETED | OUTPATIENT
Start: 2020-06-06 | End: 2020-06-06

## 2020-06-06 RX ORDER — SPIRONOLACTONE 25 MG/1
25 TABLET ORAL DAILY
Status: DISCONTINUED | OUTPATIENT
Start: 2020-06-06 | End: 2020-06-07 | Stop reason: HOSPADM

## 2020-06-06 RX ORDER — MIDODRINE HYDROCHLORIDE 5 MG/1
10 TABLET ORAL 3 TIMES DAILY
Status: DISCONTINUED | OUTPATIENT
Start: 2020-06-06 | End: 2020-06-07 | Stop reason: HOSPADM

## 2020-06-06 RX ADMIN — POTASSIUM CHLORIDE 40 MEQ: 1500 TABLET, EXTENDED RELEASE ORAL at 16:22

## 2020-06-06 RX ADMIN — BUSPIRONE HYDROCHLORIDE 30 MG: 10 TABLET ORAL at 22:31

## 2020-06-06 RX ADMIN — MIDODRINE HYDROCHLORIDE 5 MG: 5 TABLET ORAL at 08:32

## 2020-06-06 RX ADMIN — CLOPIDOGREL BISULFATE 75 MG: 75 TABLET ORAL at 08:32

## 2020-06-06 RX ADMIN — BENZTROPINE MESYLATE 1 MG: 1 TABLET ORAL at 22:33

## 2020-06-06 RX ADMIN — POTASSIUM CHLORIDE 40 MEQ: 750 TABLET, EXTENDED RELEASE ORAL at 13:15

## 2020-06-06 RX ADMIN — LAMOTRIGINE 150 MG: 100 TABLET ORAL at 08:32

## 2020-06-06 RX ADMIN — INSULIN LISPRO 3 UNITS: 100 INJECTION, SOLUTION INTRAVENOUS; SUBCUTANEOUS at 22:41

## 2020-06-06 RX ADMIN — POTASSIUM CHLORIDE 40 MEQ: 1500 TABLET, EXTENDED RELEASE ORAL at 22:32

## 2020-06-06 RX ADMIN — CEFEPIME 2 G: 2 INJECTION, POWDER, FOR SOLUTION INTRAVENOUS at 22:30

## 2020-06-06 RX ADMIN — BUSPIRONE HYDROCHLORIDE 30 MG: 10 TABLET ORAL at 08:32

## 2020-06-06 RX ADMIN — PANTOPRAZOLE SODIUM 40 MG: 40 TABLET, DELAYED RELEASE ORAL at 16:22

## 2020-06-06 RX ADMIN — SPIRONOLACTONE 25 MG: 25 TABLET ORAL at 16:22

## 2020-06-06 RX ADMIN — TIOTROPIUM BROMIDE INHALATION SPRAY 2 PUFF: 3.12 SPRAY, METERED RESPIRATORY (INHALATION) at 07:35

## 2020-06-06 RX ADMIN — LAMOTRIGINE 150 MG: 100 TABLET ORAL at 22:33

## 2020-06-06 RX ADMIN — INSULIN LISPRO 6 UNITS: 100 INJECTION, SOLUTION INTRAVENOUS; SUBCUTANEOUS at 16:28

## 2020-06-06 RX ADMIN — ASPIRIN 81 MG: 81 TABLET, COATED ORAL at 08:32

## 2020-06-06 RX ADMIN — MIDODRINE HYDROCHLORIDE 10 MG: 5 TABLET ORAL at 18:36

## 2020-06-06 RX ADMIN — CEFEPIME 2 G: 2 INJECTION, POWDER, FOR SOLUTION INTRAVENOUS at 10:09

## 2020-06-06 RX ADMIN — BUPRENORPHINE HYDROCHLORIDE, NALOXONE HYDROCHLORIDE 1 FILM: 8; 2 FILM, SOLUBLE BUCCAL; SUBLINGUAL at 09:01

## 2020-06-06 RX ADMIN — Medication 10 ML: at 22:34

## 2020-06-06 RX ADMIN — ATORVASTATIN CALCIUM 80 MG: 40 TABLET, FILM COATED ORAL at 22:32

## 2020-06-06 RX ADMIN — POTASSIUM CHLORIDE 20 MEQ: 1500 TABLET, EXTENDED RELEASE ORAL at 09:01

## 2020-06-06 RX ADMIN — BUPRENORPHINE HYDROCHLORIDE, NALOXONE HYDROCHLORIDE 1 FILM: 8; 2 FILM, SOLUBLE BUCCAL; SUBLINGUAL at 22:34

## 2020-06-06 RX ADMIN — INSULIN LISPRO 3 UNITS: 100 INJECTION, SOLUTION INTRAVENOUS; SUBCUTANEOUS at 13:19

## 2020-06-06 RX ADMIN — MIDODRINE HYDROCHLORIDE 5 MG: 5 TABLET ORAL at 13:15

## 2020-06-06 RX ADMIN — FUROSEMIDE 10 MG/HR: 10 INJECTION, SOLUTION INTRAVENOUS at 09:01

## 2020-06-06 RX ADMIN — INSULIN LISPRO 3 UNITS: 100 INJECTION, SOLUTION INTRAVENOUS; SUBCUTANEOUS at 08:34

## 2020-06-06 RX ADMIN — ENOXAPARIN SODIUM 40 MG: 40 INJECTION SUBCUTANEOUS at 09:01

## 2020-06-06 RX ADMIN — PANTOPRAZOLE SODIUM 40 MG: 40 TABLET, DELAYED RELEASE ORAL at 08:32

## 2020-06-06 ASSESSMENT — PAIN SCALES - GENERAL: PAINLEVEL_OUTOF10: 0

## 2020-06-06 NOTE — PLAN OF CARE
Problem: Falls - Risk of:  Goal: Will remain free from falls  Description: Will remain free from falls  6/6/2020 0450 by Rody Villalobos RN  Outcome: Ongoing  6/5/2020 2047 by Domingo Winchester RN  Note: Free of falls this shift  Goal: Absence of physical injury  Description: Absence of physical injury  Outcome: Ongoing     Problem: Pain:  Goal: Pain level will decrease  Description: Pain level will decrease  6/5/2020 2047 by Domingo Winchester RN  Note: Denying complaints of pain needing medication  Goal: Control of acute pain  Description: Control of acute pain  Outcome: Ongoing  Goal: Control of chronic pain  Description: Control of chronic pain  Outcome: Ongoing  Goal: Patient's pain/discomfort is manageable  Description: Patient's pain/discomfort is manageable  Outcome: Ongoing     Problem: Daily Care:  Goal: Daily care needs are met  Description: Daily care needs are met  Outcome: Ongoing     Problem: HEMODYNAMIC STATUS  Goal: Patient has stable vital signs and fluid balance

## 2020-06-06 NOTE — PLAN OF CARE
Problem: Falls - Risk of:  Goal: Will remain free from falls  Description: Will remain free from falls  Note: Free of falls this shift     Problem: Pain:  Description: Pain management should include both nonpharmacologic and pharmacologic interventions. Goal: Pain level will decrease  Description: Pain level will decrease  Note: Denying complaints of pain needing medication     Problem: HEMODYNAMIC STATUS  Goal: Patient has stable vital signs and fluid balance  Note: Patient's EF (Ejection Fraction) is less than 40%    Patient's weights and intake/output reviewed:    Patient's Last Weight: 249 lbs obtained by standing scale. Difference of 4 lbs less than last documented weight. Intake/Output Summary (Last 24 hours) at 6/5/2020 2047  Last data filed at 6/5/2020 1720  Gross per 24 hour   Intake 1980 ml   Output 5475 ml   Net -3495 ml         Pt is currently on room air . Pt denies shortness of breath. Pt with pitting lower extremity edema. Patient and/or Family's stated Goal of Care this Admission: increase activity tolerance, better understand heart failure and disease management, be more comfortable, reduce lower extremity edema, and unable to assess, will attempt again prior to discharge      Comorbidities Reviewed No  Patient has a past medical history of Anxiety and depression, Arthritis, CAD (coronary artery disease), Cardiomyopathy ([address] CHF (congestive heart failure) ([address] Chronic systolic heart failure ([address] COPD (chronic obstructive pulmonary disease) ([address] Diabetes mellitus ([address] GERD (gastroesophageal reflux disease), Hyperlipidemia, Hypertension, Hypotension, MI (myocardial infarction) ([address] Peripheral neuropathy, Peripheral vascular disease ([address] Pulmonary HTN ([address] Reflux, Sleep apnea, and Wears glasses.          >>For CHF and Comorbidity documentation on Education Time and Topics, please see Education Tab

## 2020-06-07 VITALS
HEART RATE: 91 BPM | RESPIRATION RATE: 16 BRPM | WEIGHT: 244.5 LBS | OXYGEN SATURATION: 96 % | HEIGHT: 65 IN | SYSTOLIC BLOOD PRESSURE: 107 MMHG | DIASTOLIC BLOOD PRESSURE: 70 MMHG | BODY MASS INDEX: 40.73 KG/M2 | TEMPERATURE: 97.5 F

## 2020-06-07 LAB
ALBUMIN SERPL-MCNC: 3.3 G/DL (ref 3.4–5)
ANION GAP SERPL CALCULATED.3IONS-SCNC: 15 MMOL/L (ref 3–16)
BLOOD CULTURE, ROUTINE: NORMAL
BUN BLDV-MCNC: 52 MG/DL (ref 7–20)
CALCIUM SERPL-MCNC: 9.4 MG/DL (ref 8.3–10.6)
CHLORIDE BLD-SCNC: 84 MMOL/L (ref 99–110)
CO2: 26 MMOL/L (ref 21–32)
CREAT SERPL-MCNC: 1.6 MG/DL (ref 0.6–1.1)
CULTURE, BLOOD 2: NORMAL
GFR AFRICAN AMERICAN: 40
GFR NON-AFRICAN AMERICAN: 33
GLUCOSE BLD-MCNC: 194 MG/DL (ref 70–99)
GLUCOSE BLD-MCNC: 219 MG/DL (ref 70–99)
GLUCOSE BLD-MCNC: 229 MG/DL (ref 70–99)
PERFORMED ON: ABNORMAL
PERFORMED ON: ABNORMAL
PHOSPHORUS: 2.5 MG/DL (ref 2.5–4.9)
POTASSIUM SERPL-SCNC: 3.7 MMOL/L (ref 3.5–5.1)
SODIUM BLD-SCNC: 125 MMOL/L (ref 136–145)

## 2020-06-07 PROCEDURE — 80069 RENAL FUNCTION PANEL: CPT

## 2020-06-07 PROCEDURE — 6370000000 HC RX 637 (ALT 250 FOR IP): Performed by: INTERNAL MEDICINE

## 2020-06-07 PROCEDURE — 6370000000 HC RX 637 (ALT 250 FOR IP): Performed by: HOSPITALIST

## 2020-06-07 PROCEDURE — 2580000003 HC RX 258: Performed by: HOSPITALIST

## 2020-06-07 PROCEDURE — 6360000002 HC RX W HCPCS: Performed by: HOSPITALIST

## 2020-06-07 PROCEDURE — 94640 AIRWAY INHALATION TREATMENT: CPT

## 2020-06-07 PROCEDURE — 36415 COLL VENOUS BLD VENIPUNCTURE: CPT

## 2020-06-07 RX ORDER — TOLVAPTAN 15 MG/1
30 TABLET ORAL ONCE
Status: COMPLETED | OUTPATIENT
Start: 2020-06-07 | End: 2020-06-07

## 2020-06-07 RX ORDER — METOLAZONE 2.5 MG/1
2.5 TABLET ORAL SEE ADMIN INSTRUCTIONS
COMMUNITY
Start: 2020-06-07 | End: 2020-06-29

## 2020-06-07 RX ORDER — TORSEMIDE 100 MG/1
100 TABLET ORAL DAILY
Status: ON HOLD | COMMUNITY
Start: 2020-06-07 | End: 2020-08-12 | Stop reason: SDUPTHER

## 2020-06-07 RX ORDER — CEFUROXIME AXETIL 500 MG/1
500 TABLET ORAL 2 TIMES DAILY
Qty: 14 TABLET | Refills: 0 | Status: SHIPPED | OUTPATIENT
Start: 2020-06-07 | End: 2020-06-18

## 2020-06-07 RX ADMIN — TOLVAPTAN 30 MG: 15 TABLET ORAL at 13:36

## 2020-06-07 RX ADMIN — SPIRONOLACTONE 25 MG: 25 TABLET ORAL at 10:04

## 2020-06-07 RX ADMIN — PANTOPRAZOLE SODIUM 40 MG: 40 TABLET, DELAYED RELEASE ORAL at 10:03

## 2020-06-07 RX ADMIN — INSULIN LISPRO 4 UNITS: 100 INJECTION, SOLUTION INTRAVENOUS; SUBCUTANEOUS at 11:22

## 2020-06-07 RX ADMIN — BUPRENORPHINE HYDROCHLORIDE, NALOXONE HYDROCHLORIDE 1 FILM: 8; 2 FILM, SOLUBLE BUCCAL; SUBLINGUAL at 10:05

## 2020-06-07 RX ADMIN — TIOTROPIUM BROMIDE INHALATION SPRAY 2 PUFF: 3.12 SPRAY, METERED RESPIRATORY (INHALATION) at 07:56

## 2020-06-07 RX ADMIN — CEFEPIME 2 G: 2 INJECTION, POWDER, FOR SOLUTION INTRAVENOUS at 08:00

## 2020-06-07 RX ADMIN — MIDODRINE HYDROCHLORIDE 10 MG: 5 TABLET ORAL at 13:36

## 2020-06-07 RX ADMIN — BUSPIRONE HYDROCHLORIDE 30 MG: 10 TABLET ORAL at 10:15

## 2020-06-07 RX ADMIN — Medication 10 ML: at 10:05

## 2020-06-07 RX ADMIN — INSULIN LISPRO 2 UNITS: 100 INJECTION, SOLUTION INTRAVENOUS; SUBCUTANEOUS at 07:35

## 2020-06-07 RX ADMIN — ASPIRIN 81 MG: 81 TABLET, COATED ORAL at 10:03

## 2020-06-07 RX ADMIN — MIDODRINE HYDROCHLORIDE 10 MG: 5 TABLET ORAL at 10:03

## 2020-06-07 RX ADMIN — ENOXAPARIN SODIUM 40 MG: 40 INJECTION SUBCUTANEOUS at 10:04

## 2020-06-07 RX ADMIN — LAMOTRIGINE 150 MG: 100 TABLET ORAL at 10:03

## 2020-06-07 RX ADMIN — CLOPIDOGREL BISULFATE 75 MG: 75 TABLET ORAL at 10:04

## 2020-06-07 ASSESSMENT — PAIN SCALES - GENERAL: PAINLEVEL_OUTOF10: 0

## 2020-06-07 NOTE — DISCHARGE SUMMARY
antibiotics           Acute on Chronic systolic CHF  Valvular heart disease moderate to severe mitral regurgitation  -Due to hypertensive heart disease and cardiomyopathy   - EF 25% with grade 2 diastolic dysfunction most recent echo in January 2020  -Significantly fluid overloaded with peripheral edema with weeping wounds  -Continued daily weights, fluid restriction, sodium restriction  -Did not respond to IV Lasix, seen by nephrology, and started on Lasix drip at 10 mg an hour on 6/4/2020. urine output has picked up significantly, with negative fluid balance. -weight is down from 254-- >  252 -->  249 -- > 244 pounds today. Negative fluid balance of 8.2 L  -Lasix drip discontinued on 6/6/2020.   -On oral diuretics now  -Discharge home on torsemide 100 mg daily, Aldactone 25 mg twice daily, and Zaroxolyn 3 times weekly. (This is an increase in dose as compared to prior to admission home dose)       Recurrent Hyponatremia due to decompensated CHF. -Patient has recurrent issues with this likely secondary to volume overload in this admission  -Sodium on admission was 120, patient was started on the IV fluids, sodium did not improve  -IV fluids were stopped given the fluid overloaded volume status and IV Lasix was started  -Nephrology consulted: Started IV Lasix drip  -Continued Midodrine during IV Lasix use to support blood pressure  -Sodium level is improving  , sodium level stable at 125 on discharge     Bilateral lower extremity cellulitis  -Patient denies any infectious symptoms at home, white blood cell count within normal limits, afebrile  -Continued IV antibiotics Abx, Cefepime D#5  -Continued dressing changes with Kerlix wrap  -Cellulitis is improving today, decrease in serous discharge.   Discharge home on oral antibiotics     Hypokalemia   - Continue PO replacement      CAD  Cardiomyopathy    Elevated troponin   - initially elevated, trended WNL   - No acute ACS symptoms      CKD Stage III   - Baseline as above    Discharge Exam:  Blood pressure 107/70, pulse 91, temperature 97.5 °F (36.4 °C), temperature source Oral, resp. rate 16, height 5' 4.5\" (1.638 m), weight 244 lb 8 oz (110.9 kg), last menstrual period 10/24/2012, SpO2 96 %    Gen: No distress. Alert. Awake and well-oriented  Eyes: PERRL. No sclera icterus. No conjunctival injection. ENT: No discharge. Pharynx clear. Neck:  Trachea midline. Resp: No accessory muscle use. No crackles. No wheezes. No rhonchi. CV: Regular rate. Regular rhythm. No murmur. No rub.  bilateral lower extremity edema 1+ edema. Capillary Refill: Brisk,< 3 seconds   Peripheral Pulses: +2 palpable, equal bilaterally   GI: Non-tender. Non-distended. Normal bowel sounds. Skin: Erythema without increased warmth of bilateral lower extremities with open, weeping wounds, multiple scabbed lesions to bilateral lower extremities. Serous discharge is decreased today  - bilateral lower extremity down to 1+ edema  M/S: Tenderness to palpation bilateral lower extremities  Neuro: Awake. Grossly nonfocal    Psych: Oriented x 3.  No anxiety or agitation.      Disposition: home    Patient Instructions:      Medication List      START taking these medications    cefUROXime 500 MG tablet  Commonly known as:  CEFTIN  Take 1 tablet by mouth 2 times daily for 7 days        CHANGE how you take these medications    metOLazone 2.5 MG tablet  Commonly known as:  ZAROXOLYN  What changed:    · when to take this  · reasons to take this  · additional instructions     torsemide 100 MG tablet  Commonly known as:  DEMADEX  What changed:    · how much to take  · how to take this  · when to take this        CONTINUE taking these medications    albuterol sulfate  (90 Base) MCG/ACT inhaler     ARIPiprazole 10 MG tablet  Commonly known as:  ABILIFY     aspirin EC 81 MG EC tablet  Take 1 tablet by mouth daily     atorvastatin 80 MG tablet  Commonly known as:  LIPITOR  Take 1 tablet by mouth nightly

## 2020-06-07 NOTE — PROGRESS NOTES
.report given to [facility] RN Pt. Stable. Care transferred at this time.
Admitting Physician:  Vancomycin administered by ED provider. Please consult Pharmacy if you wish to continue vancomycin after admission. Thanks. Pharmacy    Devon Moody Pharm. D. 6/3/2020 8:42 PM
Bedside report given to [facility]. Pt. denies further needs at this time. Call light is within reach.   Karena Lay RN
[facility]       Progress Note           Subjective/   62y.o. year old female who we are seeing in consultation for Hyponatremia, CKD, fluid overload. HPI:  Responding well with lasix drip, urine output of 5.7 liters over last 24 hours. Sob better. ROS:  Intake adequate.  +weak.     Objective/   GEN:  Chronically ill, /70   Pulse 91   Temp 96.8 °F (36 °C) (Oral)   Resp 16   Ht 5' 4.5\" (1.638 m)   Wt 249 lb 1.6 oz (113 kg)   LMP 10/24/2012   SpO2 96%   BMI 42.10 kg/m²   HEENT: non-icteric, no JVD  CV: S1, S2 without m/r/g; ++ LE edema with erythema  RESP: CTA B without w/r/r; breathing wnl  ABD: +bs, soft, nt, no hsm  SKIN: warm, no rashes    Data/  Recent Labs     06/03/20  1820 06/03/20  2237 06/04/20  0438   WBC 10.4 9.5 8.4   HGB 10.9* 10.9* 10.8*   HCT 34.9* 34.9* 34.0*   MCV 76.6* 78.5* 78.4*    370 400     Recent Labs     06/03/20  1820 06/04/20  0438 06/04/20  1247 06/05/20  0456   * 121* 125* 125*   K 3.4* 3.2* 3.0* 3.7   CL 75* 78* 84* 84*   CO2 30 29 29 29   GLUCOSE 273* 211* 214* 233*   PHOS  --   --  3.3 3.0   MG 1.90 1.90  --   --    BUN 60* 56* 53* 53*   CREATININE 1.6* 1.5* 1.3* 1.5*   LABGLOM 33* 36* 42* 36*   GFRAA 40* 43* 51* 43*       Assessment/     - Chronic kidney disease stage 3 - Baseline SCr 1.5-1.7     - Hyponatremia in setting of decompensated CHF & metolazone use               Na previously in low to mid 130 range, as low as 121, improving with diuresis     - CHF - EF 35-40%, moderate to severe MR     - LE cellulitis - plans per Admitting     - Hypotension - on scheduled midodrine as outpatient     - Hypokalemia - prn replacement    Plan/     - Continue lasix infusion 10 mg/hour - has responded well to this during prior admissions  - Fluid restriction 2 liters/day  - Continue midodrine for BP support while diuresing  - Trend labs
Patient up to bathroom. Specimen collected and sent to lab. Patient steady on feet and had 800 ml's out. Patient back to bed. No requests at this time. Call light within reach, will continue to monitor.
Perfect Serve sent to [facility], lab called a panic K+ of 2.92
Progress Note    Admit Date:  6/3/2020    Admitted for cellulitis and hyponatremia     Subjective:  Ms. Desmond Rios reports continued pain and swelling of bilateral lower extremities. She states she is had this happen multiple times before. She had recently had an admission at [facility] for similar issues. She denies any associated fevers or chills. She denies any chest pain or shortness of breath. Objective:   Patient Vitals for the past 4 hrs:   BP Temp Temp src Pulse Resp SpO2   06/04/20 0919 104/73 97.9 °F (36.6 °C) Oral 89 16 96 %        Intake/Output Summary (Last 24 hours) at 6/4/2020 1130  Last data filed at 6/4/2020 0931  Gross per 24 hour   Intake 2903 ml   Output 1600 ml   Net 1303 ml       Physical Exam:  Gen: No distress. Alert. Eyes: PERRL. No sclera icterus. No conjunctival injection. ENT: No discharge. Pharynx clear. Neck:  Trachea midline. Resp: No accessory muscle use. No crackles. No wheezes. No rhonchi. CV: Regular rate. Regular rhythm. No murmur. No rub. Significant bilateral lower extremity edema. Capillary Refill: Brisk,< 3 seconds   Peripheral Pulses: +2 palpable, equal bilaterally   GI: Non-tender. Non-distended. Normal bowel sounds. Skin: Erythema without increased warmth of bilateral lower extremities with open, weeping wounds, multiple scabbed lesions to bilateral lower extremities  M/S: Tenderness to palpation bilateral lower extremities  Neuro: Awake. Grossly nonfocal    Psych: Oriented x 3. No anxiety or agitation.      Scheduled Meds:   cefepime  2 g Intravenous Q12H    ARIPiprazole  10 mg Oral Daily    aspirin EC  81 mg Oral Daily    atorvastatin  80 mg Oral Nightly    benztropine  1 mg Oral Nightly    buprenorphine-naloxone  1 Film Sublingual BID    busPIRone  30 mg Oral BID    clopidogrel  75 mg Oral Daily    lamoTRIgine  150 mg Oral BID    midodrine  5 mg Oral TID    pantoprazole  40 mg Oral BID    tiotropium  2 puff Inhalation Daily    sodium chloride
Pt sitting up on edge of bed watching TV. Shift assessment complete. All meds given per [facility]. Gave 3 units of insulin PRN for . VSS. Snack and beverage offered. Will continue to monitor and assess.
Pt. C/o pain in her legs. PRN tylenol given per [facility] order. Pt. denies further needs at this time. Will continue to monitor. Call light is within reach.   Marni Cline RN
Pt. In bed awake. No signs of distress noted. Pt. Assessment completed- see flow sheet. Pt. denies further needs at this time. Will continue to monitor. Call light is within reach.   Deep Zabala RN
growth to date  Cultures: No growth to date     RADIOLOGY  XR CHEST STANDARD (2 VW)   Final Result   Stable pattern of cardiomegaly with no acute finding in the chest.                   Assessment/Plan:  Acute on Chronic systolic CHF  -Due to hypertensive heart disease and cardiomyopathy   - EF 25% with grade 2 diastolic dysfunction most recent echo in January 2020  -Significantly fluid overloaded with peripheral edema with weeping wounds  -Did not respond to IV Lasix, seen by nephrology, started on Lasix drip now.   -On Lasix drip at 10 mg an hour since 6/4/2020, urine output has picked up, negative fluid balance. -weight is down from 254-- >  252 -->  249 -- > 243 pounds today. Negative fluid balance of 6.2 L  -Blood pressure trending down , will decrease dose of Lasix drip to 5 mg an hour, monitor BMP. -Continue Daily weights, fluid restriction, sodium restriction    Recurrent Hyponatremia due to decompensated CHF. -Patient has recurrent issues with this likely secondary to volume overload in this admission  -Sodium on admission was 120, patient was started on the IV fluids, sodium did not improve  -IV fluids were stopped given the fluid overloaded volume status and IV Lasix was started  -Nephrology consulted: Started IV Lasix drip-> continue  -Continue Midodrine during IV Lasix use  -Sodium level is improving  120 -- > 125--> 127 today.     Bilateral lower extremity cellulitis  -Patient denies any infectious symptoms at home, white blood cell count within normal limits, afebrile  - continue Abx, Cefepime D#4  -Continue dressing changes with Kerlix wrap  -Cellulitis is improving today    Hypokalemia   - Continue PO replacement     CAD  Cardiomyopathy    Elevated troponin   - initially elevated, trended WNL   - No acute ACS symptoms     CKD Stage III   - Baseline creatinine ~1.4-1.7  - Creatinine on admission 1.6->  1.5- > 1.4  today  - Stable, continue to monitor closely with IV diuretics     DM2  - BG is
systolic CHF  - EF 19% with grade 2 diastolic dysfunction most recent echo in January 2020  -Significantly fluid overloaded with peripheral edema with weeping wounds  -Did not respond to IV Lasix, seen by nephrology, started on Lasix drip now.   -On Lasix drip at 10 mg an hour since 6/4/2020, urine output has picked up, negative fluid balance. Continue Lasix drip and monitor   - Daily weights, fluid restriction, sodium restriction    Recurrent Hyponatremia due to decompensated CHF. -Patient has recurrent issues with this likely secondary to volume overload in this admission  -Sodium on admission was 120, patient was started on the IV fluids, sodium did not improve  -IV fluids were stopped given the fluid overloaded volume status and IV Lasix was started  -Nephrology consulted: Started IV Lasix drip-> continue  -Continue Midodrine during IV Lasix use  -Sodium level remains at 125. Bilateral lower extremity cellulitis  -Patient denies any infectious symptoms at home, white blood cell count within normal limits, afebrile  - continue Abx, Cefepime D#3  -Continue dressing changes with Kerlix wrap    Hypokalemia   - Continue PO replacement     CAD  Cardiomyopathy    Elevated troponin   - initially elevated, trended WNL   - No acute ACS symptoms     CKD Stage III   - Baseline creatinine ~1.4-1.7  - Creatinine on admission 1.6->  1.5 today  - Stable, but continue to monitor closely with IV diuretics     DM2  - BG is controlled  - Continue SSI     COPD   - No AE   - Continue home medications     History of anxiety and depression  -Abilify on hold due to prolonged QTC    DVT Prophylaxis: Lovenox   Diet: DIET CARB CONTROL; Low Sodium (2 GM);  Daily Fluid Restriction: 2000 ml  Code Status: Full Code        Miguel Fontaine 6/5/2020 10:57 AM
and/or modality will be adjusted as defined by the patients Respiratory Severity Index (RSI) score. 2. If the patient does not have documented COPD, consider discontinuing anticholinergics when RSI is less than 9.  3. If the bronchospasm worsens (increased RSI), then the bronchodilator frequency can be increased to a maximum of every 4 hours. If greater than every 4 hours is required, the physician will be contacted. 4. If the bronchospasm improves, the frequency of the bronchodilator can be decreased, based on the patient's RSI, but not less than home treatment regimen frequency. 5. Bronchodilator(s) will be discontinued if patient has a RSI less than 9 and has received no scheduled or as needed treatment for 72  Hrs. Patients Ordered on a Mucolytic Agent:    1. Must always be administered with a bronchodilator. 2. Discontinue if patient experiences worsened bronchospasm, or secretions have lessened to the point that the patient is able to clear them with a cough. Anti-inflammatory and Combination Medications:    1. If the patient lacks prior history of lung disease, is not using inhaled anti-inflammatory medication at home, and lacks wheezing by examination or by history for at least 24 hours, contact physician for possible discontinuation.

## 2020-06-08 ENCOUNTER — TELEPHONE (OUTPATIENT)
Dept: CARDIOLOGY CLINIC | Age: 55
End: 2020-06-08

## 2020-06-08 ENCOUNTER — FOLLOWUP TELEPHONE ENCOUNTER (OUTPATIENT)
Dept: ADMINISTRATIVE | Age: 55
End: 2020-06-08

## 2020-06-08 NOTE — TELEPHONE ENCOUNTER
Noted pt contact with primary cardiology office [facility]. Pt has Cardiomems and will send readings. Pt has PCP appointment today but this was not able to be relayed in her weekend discharge instructions. [facility] transition included reason for hospitalization, procedures performed during hospitalization, services provided during hospitalization, discharge medications, and follow-up treatment and services needed. Confirmed receipt was sent successfully. Unable to reach pt via 1st telephone attempt as listed home number does not have VM set up. Unable to leave message at this time.

## 2020-06-09 NOTE — TELEPHONE ENCOUNTER
No further attempts will be made as additional call to [address] confirmed pt attended a PCP hospital follow up appointment yesterday on 6/8 with Byron CRESPO. Confirmed with office staff Sparkle Mcnamara and confirmed care transitional records were received. Encounter closed at this time.

## 2020-06-10 ENCOUNTER — TELEPHONE (OUTPATIENT)
Dept: ORTHOPEDIC SURGERY | Age: 55
End: 2020-06-10

## 2020-06-10 ENCOUNTER — HOSPITAL ENCOUNTER (OUTPATIENT)
Age: 55
Discharge: HOME | End: 2020-06-10
Payer: COMMERCIAL

## 2020-06-10 LAB
ANION GAP SERPL CALCULATED.3IONS-SCNC: 17 MMOL/L (ref 3–16)
BUN BLDV-MCNC: 66 MG/DL (ref 7–20)
CALCIUM SERPL-MCNC: 9.2 MG/DL (ref 8.3–10.6)
CHLORIDE BLD-SCNC: 88 MMOL/L (ref 99–110)
CO2: 25 MMOL/L (ref 21–32)
CREAT SERPL-MCNC: 1.9 MG/DL (ref 0.6–1.1)
GFR AFRICAN AMERICAN: 33
GFR NON-AFRICAN AMERICAN: 27
GLUCOSE BLD-MCNC: 154 MG/DL (ref 70–99)
POTASSIUM SERPL-SCNC: 3.9 MMOL/L (ref 3.5–5.1)
PRO-BNP: 3966 PG/ML (ref 0–124)
SODIUM BLD-SCNC: 130 MMOL/L (ref 136–145)

## 2020-06-10 PROCEDURE — 80048 BASIC METABOLIC PNL TOTAL CA: CPT

## 2020-06-10 PROCEDURE — 36415 COLL VENOUS BLD VENIPUNCTURE: CPT

## 2020-06-10 PROCEDURE — 83880 ASSAY OF NATRIURETIC PEPTIDE: CPT

## 2020-06-10 NOTE — TELEPHONE ENCOUNTER
Patient was referred to Dr. Edson Shipley for LSP  MRI NO  PT NO   MEDICAL HX POSITIVE  Patient is DENIED to be seen at this time. Message sent to schedulers notifying of denial.   Please notify referring physician if denied.

## 2020-06-18 ENCOUNTER — HOSPITAL ENCOUNTER (OUTPATIENT)
Dept: WOUND CARE | Age: 55
Discharge: HOME | End: 2020-06-18
Payer: COMMERCIAL

## 2020-06-18 VITALS
DIASTOLIC BLOOD PRESSURE: 73 MMHG | WEIGHT: 234 LBS | RESPIRATION RATE: 16 BRPM | TEMPERATURE: 97.2 F | HEART RATE: 102 BPM | HEIGHT: 65 IN | SYSTOLIC BLOOD PRESSURE: 105 MMHG | BODY MASS INDEX: 38.99 KG/M2

## 2020-06-18 PROCEDURE — 99203 OFFICE O/P NEW LOW 30 MIN: CPT | Performed by: INTERNAL MEDICINE

## 2020-06-18 PROCEDURE — 97597 DBRDMT OPN WND 1ST 20 CM/<: CPT

## 2020-06-18 PROCEDURE — 99213 OFFICE O/P EST LOW 20 MIN: CPT

## 2020-06-18 PROCEDURE — 97597 DBRDMT OPN WND 1ST 20 CM/<: CPT | Performed by: INTERNAL MEDICINE

## 2020-06-18 RX ORDER — LIDOCAINE 40 MG/G
CREAM TOPICAL ONCE
Status: DISCONTINUED | OUTPATIENT
Start: 2020-06-18 | End: 2020-06-19 | Stop reason: HOSPADM

## 2020-06-18 ASSESSMENT — PAIN SCALES - GENERAL: PAINLEVEL_OUTOF10: 6

## 2020-06-22 PROBLEM — E11.621 DIABETIC ULCER OF LEFT FOOT ASSOCIATED WITH TYPE 2 DIABETES MELLITUS (HCC): Status: RESOLVED | Noted: 2020-01-18 | Resolved: 2020-06-22

## 2020-06-22 PROBLEM — I50.23 ACUTE ON CHRONIC SYSTOLIC HEART FAILURE (HCC): Status: RESOLVED | Noted: 2020-02-21 | Resolved: 2020-06-22

## 2020-06-22 PROBLEM — L97.519 DIABETIC ULCER OF TOE OF RIGHT FOOT ASSOCIATED WITH TYPE 2 DIABETES MELLITUS (HCC): Status: RESOLVED | Noted: 2020-01-18 | Resolved: 2020-06-22

## 2020-06-22 PROBLEM — N18.9 ACUTE KIDNEY INJURY SUPERIMPOSED ON CHRONIC KIDNEY DISEASE (HCC): Status: RESOLVED | Noted: 2019-04-09 | Resolved: 2020-06-22

## 2020-06-22 PROBLEM — Z95.1 S/P CORONARY ARTERY BYPASS GRAFT X 1: Status: RESOLVED | Noted: 2020-02-28 | Resolved: 2020-06-22

## 2020-06-22 PROBLEM — N17.9 ACUTE KIDNEY INJURY (HCC): Status: RESOLVED | Noted: 2019-12-25 | Resolved: 2020-06-22

## 2020-06-22 PROBLEM — R19.5 POSITIVE FIT (FECAL IMMUNOCHEMICAL TEST): Status: RESOLVED | Noted: 2020-02-28 | Resolved: 2020-06-22

## 2020-06-22 PROBLEM — N17.9 ACUTE KIDNEY INJURY SUPERIMPOSED ON CHRONIC KIDNEY DISEASE (HCC): Status: RESOLVED | Noted: 2019-04-09 | Resolved: 2020-06-22

## 2020-06-22 PROBLEM — E87.8 HYPOCHLOREMIA: Status: RESOLVED | Noted: 2018-05-23 | Resolved: 2020-06-22

## 2020-06-22 PROBLEM — L97.529 DIABETIC ULCER OF LEFT FOOT ASSOCIATED WITH TYPE 2 DIABETES MELLITUS (HCC): Status: RESOLVED | Noted: 2020-01-18 | Resolved: 2020-06-22

## 2020-06-22 PROBLEM — N17.9 AKI (ACUTE KIDNEY INJURY) (HCC): Status: RESOLVED | Noted: 2018-05-23 | Resolved: 2020-06-22

## 2020-06-22 PROBLEM — I50.9 CONGESTIVE HEART FAILURE (HCC): Status: RESOLVED | Noted: 2020-02-28 | Resolved: 2020-06-22

## 2020-06-22 PROBLEM — I42.9 MYOCARDIOPATHY (HCC): Status: RESOLVED | Noted: 2019-07-08 | Resolved: 2020-06-22

## 2020-06-22 PROBLEM — F42.4 HABITUAL SELF-EXCORIATION: Status: ACTIVE | Noted: 2020-06-22

## 2020-06-22 PROBLEM — R60.0 BILATERAL LOWER EXTREMITY EDEMA: Status: ACTIVE | Noted: 2020-06-22

## 2020-06-22 PROBLEM — L03.90 CELLULITIS: Status: RESOLVED | Noted: 2020-06-03 | Resolved: 2020-06-22

## 2020-06-22 PROBLEM — E87.6 HYPOKALEMIA: Status: RESOLVED | Noted: 2020-02-28 | Resolved: 2020-06-22

## 2020-06-22 PROBLEM — R11.2 INTRACTABLE NAUSEA AND VOMITING: Status: RESOLVED | Noted: 2020-02-28 | Resolved: 2020-06-22

## 2020-06-22 PROBLEM — L03.116 CELLULITIS OF BOTH FEET: Status: RESOLVED | Noted: 2019-12-26 | Resolved: 2020-06-22

## 2020-06-22 PROBLEM — E11.621 DIABETIC ULCER OF TOE OF RIGHT FOOT ASSOCIATED WITH TYPE 2 DIABETES MELLITUS (HCC): Status: RESOLVED | Noted: 2020-01-18 | Resolved: 2020-06-22

## 2020-06-22 PROBLEM — D50.9 IRON DEFICIENCY ANEMIA: Status: ACTIVE | Noted: 2018-05-23

## 2020-06-22 PROBLEM — Z95.828 HISTORY OF ARTERIAL BYPASS OF LOWER EXTREMITY: Status: RESOLVED | Noted: 2020-02-28 | Resolved: 2020-06-22

## 2020-06-22 PROBLEM — I50.43 CHF (CONGESTIVE HEART FAILURE), NYHA CLASS I, ACUTE ON CHRONIC, COMBINED (HCC): Status: RESOLVED | Noted: 2020-03-20 | Resolved: 2020-06-22

## 2020-06-22 PROBLEM — R00.2 PALPITATIONS: Status: RESOLVED | Noted: 2018-11-19 | Resolved: 2020-06-22

## 2020-06-22 PROBLEM — I50.22 CHRONIC SYSTOLIC HEART FAILURE (HCC): Status: ACTIVE | Noted: 2020-02-21

## 2020-06-22 PROBLEM — L97.921 ULCER OF LEFT LOWER LEG, LIMITED TO BREAKDOWN OF SKIN (HCC): Status: ACTIVE | Noted: 2020-06-22

## 2020-06-22 PROBLEM — L97.912 ULCER OF RIGHT LEG, WITH FAT LAYER EXPOSED (HCC): Status: ACTIVE | Noted: 2020-06-22

## 2020-06-22 PROBLEM — I87.2 STASIS DERMATITIS OF BOTH LEGS: Status: ACTIVE | Noted: 2020-06-22

## 2020-06-22 PROBLEM — L03.115 CELLULITIS OF BOTH FEET: Status: RESOLVED | Noted: 2019-12-26 | Resolved: 2020-06-22

## 2020-06-22 PROBLEM — M79.672 LEFT FOOT PAIN: Status: RESOLVED | Noted: 2020-04-27 | Resolved: 2020-06-22

## 2020-06-22 PROBLEM — E66.01 CLASS 2 SEVERE OBESITY DUE TO EXCESS CALORIES WITH SERIOUS COMORBIDITY AND BODY MASS INDEX (BMI) OF 39.0 TO 39.9 IN ADULT (HCC): Status: ACTIVE | Noted: 2020-02-28

## 2020-06-23 ENCOUNTER — VIRTUAL VISIT (OUTPATIENT)
Dept: ENDOCRINOLOGY | Age: 55
End: 2020-06-23
Payer: COMMERCIAL

## 2020-06-23 PROCEDURE — 1111F DSCHRG MED/CURRENT MED MERGE: CPT | Performed by: NURSE PRACTITIONER

## 2020-06-23 PROCEDURE — 3051F HG A1C>EQUAL 7.0%<8.0%: CPT | Performed by: NURSE PRACTITIONER

## 2020-06-23 PROCEDURE — 99214 OFFICE O/P EST MOD 30 MIN: CPT | Performed by: NURSE PRACTITIONER

## 2020-06-23 PROCEDURE — 2022F DILAT RTA XM EVC RTNOPTHY: CPT | Performed by: NURSE PRACTITIONER

## 2020-06-23 PROCEDURE — G8427 DOCREV CUR MEDS BY ELIG CLIN: HCPCS | Performed by: NURSE PRACTITIONER

## 2020-06-23 PROCEDURE — 3017F COLORECTAL CA SCREEN DOC REV: CPT | Performed by: NURSE PRACTITIONER

## 2020-06-23 ASSESSMENT — ENCOUNTER SYMPTOMS
CONSTIPATION: 0
COLOR CHANGE: 0
NAUSEA: 0
SHORTNESS OF BREATH: 0
EYE PAIN: 0
DIARRHEA: 0

## 2020-06-23 NOTE — PROGRESS NOTES
of dizziness, light headedness. Occasional dependent edema. Tries to follow a salt restricted diet. Lab Results   Component Value Date     (L) 06/10/2020    K 3.9 06/10/2020    CL 88 (L) 06/10/2020    CO2 25 06/10/2020    BUN 66 (H) 06/10/2020    CREATININE 1.9 (H) 06/10/2020    GLUCOSE 154 (H) 06/10/2020    CALCIUM 9.2 06/10/2020    PROT 6.8 06/04/2020    LABALBU 3.3 (L) 06/07/2020    BILITOT 0.7 06/04/2020    ALKPHOS 141 (H) 06/04/2020    AST 18 06/04/2020    ALT 10 06/04/2020    LABGLOM 27 (A) 06/10/2020    GFRAA 33 (A) 06/10/2020    AGRATIO 1.1 06/03/2020    GLOB 3.4 06/03/2020     The ASCVD Risk score (Grover Coronado et al., 2013) failed to calculate for the following reasons: The valid total cholesterol range is 130 to 320 mg/dL    Family History   Problem Relation Age of Onset    Heart Disease Maternal Grandmother     Cancer Mother         lung and brain cancer    Cancer Maternal Aunt         lung and brain cancer     Review of Systems   Constitutional: Negative for activity change, appetite change, diaphoresis, fever and unexpected weight change. HENT: Negative for dental problem. Eyes: Negative for pain and visual disturbance. Respiratory: Negative for shortness of breath. Cardiovascular: Negative for chest pain, palpitations and leg swelling. Gastrointestinal: Negative for constipation, diarrhea and nausea. Endocrine: Negative for cold intolerance, heat intolerance, polydipsia, polyphagia and polyuria. Genitourinary: Negative for frequency and urgency. Musculoskeletal: Negative for arthralgias, joint swelling and myalgias. Skin: Negative for color change and pallor. Neurological: Negative for weakness, numbness and headaches. Psychiatric/Behavioral: Negative for dysphoric mood and sleep disturbance. The patient is not nervous/anxious. There were no vitals filed for this visit.  televisit    Physical Exam televisit    Skeletal foot exam: televisit    ulcer on right

## 2020-06-24 ENCOUNTER — TELEPHONE (OUTPATIENT)
Dept: VASCULAR SURGERY | Age: 55
End: 2020-06-24

## 2020-06-24 NOTE — TELEPHONE ENCOUNTER
Called patient regarding getting arterial duplex prior to apt to see Dr Christine Amor.  Scheduled Friday 6/26/20 at [place] at 9:00am.aimee

## 2020-06-25 ENCOUNTER — PROCEDURE VISIT (OUTPATIENT)
Dept: CARDIOLOGY CLINIC | Age: 55
End: 2020-06-25
Payer: COMMERCIAL

## 2020-06-25 ENCOUNTER — HOSPITAL ENCOUNTER (OUTPATIENT)
Dept: WOUND CARE | Age: 55
Discharge: HOME | End: 2020-06-25
Payer: COMMERCIAL

## 2020-06-25 ENCOUNTER — TELEPHONE (OUTPATIENT)
Dept: CARDIOLOGY CLINIC | Age: 55
End: 2020-06-25

## 2020-06-25 VITALS
HEART RATE: 65 BPM | HEIGHT: 65 IN | BODY MASS INDEX: 39.99 KG/M2 | RESPIRATION RATE: 18 BRPM | DIASTOLIC BLOOD PRESSURE: 60 MMHG | WEIGHT: 240 LBS | TEMPERATURE: 98 F | OXYGEN SATURATION: 97 % | SYSTOLIC BLOOD PRESSURE: 80 MMHG

## 2020-06-25 PROCEDURE — 93264 REM MNTR WRLS P-ART PRS SNR: CPT | Performed by: NURSE PRACTITIONER

## 2020-06-25 PROCEDURE — 97597 DBRDMT OPN WND 1ST 20 CM/<: CPT

## 2020-06-25 PROCEDURE — 97598 DBRDMT OPN WND ADDL 20CM/<: CPT | Performed by: INTERNAL MEDICINE

## 2020-06-25 PROCEDURE — 97598 DBRDMT OPN WND ADDL 20CM/<: CPT

## 2020-06-25 PROCEDURE — 97597 DBRDMT OPN WND 1ST 20 CM/<: CPT | Performed by: INTERNAL MEDICINE

## 2020-06-25 RX ORDER — LIDOCAINE 40 MG/G
CREAM TOPICAL PRN
Status: DISCONTINUED | OUTPATIENT
Start: 2020-06-25 | End: 2020-06-26 | Stop reason: HOSPADM

## 2020-06-25 NOTE — TELEPHONE ENCOUNTER
Spoke to patient. Doing fair but her weight was up 6 lbs today. Being seen in wound clinic and to see Dr. Kari Hall tomorrow with BLE ZACH's. Reviewed CardioMEMs readings - pressures (PAD) coming down. No changes at this time. Will have blood work tomorrow at hospital when there for BLE ZACH's.      Angela Duong, TRAY - CNP

## 2020-06-26 ENCOUNTER — OFFICE VISIT (OUTPATIENT)
Dept: VASCULAR SURGERY | Age: 55
End: 2020-06-26
Payer: COMMERCIAL

## 2020-06-26 ENCOUNTER — HOSPITAL ENCOUNTER (OUTPATIENT)
Dept: VASCULAR LAB | Age: 55
Discharge: HOME | DRG: 194 | End: 2020-06-26
Payer: COMMERCIAL

## 2020-06-26 ENCOUNTER — HOSPITAL ENCOUNTER (OUTPATIENT)
Age: 55
Discharge: HOME | End: 2020-06-26
Payer: COMMERCIAL

## 2020-06-26 ENCOUNTER — TELEPHONE (OUTPATIENT)
Dept: CARDIOLOGY CLINIC | Age: 55
End: 2020-06-26

## 2020-06-26 VITALS
HEIGHT: 65 IN | DIASTOLIC BLOOD PRESSURE: 48 MMHG | BODY MASS INDEX: 39.99 KG/M2 | WEIGHT: 240 LBS | SYSTOLIC BLOOD PRESSURE: 70 MMHG

## 2020-06-26 LAB
ANION GAP SERPL CALCULATED.3IONS-SCNC: 17 MMOL/L (ref 3–16)
BUN BLDV-MCNC: 110 MG/DL (ref 7–20)
CALCIUM SERPL-MCNC: 9 MG/DL (ref 8.3–10.6)
CHLORIDE BLD-SCNC: 82 MMOL/L (ref 99–110)
CO2: 27 MMOL/L (ref 21–32)
CREAT SERPL-MCNC: 2.7 MG/DL (ref 0.6–1.1)
GFR AFRICAN AMERICAN: 22
GFR NON-AFRICAN AMERICAN: 18
GLUCOSE BLD-MCNC: 204 MG/DL (ref 70–99)
POTASSIUM SERPL-SCNC: 4.2 MMOL/L (ref 3.5–5.1)
PRO-BNP: 2376 PG/ML (ref 0–124)
SODIUM BLD-SCNC: 126 MMOL/L (ref 136–145)

## 2020-06-26 PROCEDURE — G8427 DOCREV CUR MEDS BY ELIG CLIN: HCPCS | Performed by: SURGERY

## 2020-06-26 PROCEDURE — 93925 LOWER EXTREMITY STUDY: CPT

## 2020-06-26 PROCEDURE — 83880 ASSAY OF NATRIURETIC PEPTIDE: CPT

## 2020-06-26 PROCEDURE — G8417 CALC BMI ABV UP PARAM F/U: HCPCS | Performed by: SURGERY

## 2020-06-26 PROCEDURE — 80048 BASIC METABOLIC PNL TOTAL CA: CPT

## 2020-06-26 PROCEDURE — 99243 OFF/OP CNSLTJ NEW/EST LOW 30: CPT | Performed by: SURGERY

## 2020-06-26 PROCEDURE — 36415 COLL VENOUS BLD VENIPUNCTURE: CPT

## 2020-06-26 RX ORDER — MIDODRINE HYDROCHLORIDE 5 MG/1
7.5 TABLET ORAL 3 TIMES DAILY
Qty: 90 TABLET | Refills: 3 | Status: ON HOLD | OUTPATIENT
Start: 2020-06-26 | End: 2020-08-12 | Stop reason: HOSPADM

## 2020-06-26 NOTE — PROGRESS NOTES
last week, mostly not as deep, one area nice and red, others more pale and fibrotic, also some fibrin and biofilm, serous exudate, no signs of infection. Photos also saved in electronic chart.     Today's wound measurements, per RN documentation:  Wound 06/18/20 #1, left lower leg cluster, venous, partial thickness, onset 6/1/2020-Wound Length (cm): 1.3 cm  Wound 06/18/20 #2, right lower leg cluster, venous, full thickness, onset 6/1/2020-Wound Length (cm): 14.5 cm    Wound 06/18/20 #1, left lower leg cluster, venous, partial thickness, onset 6/1/2020-Wound Width (cm): 0.3 cm  Wound 06/18/20 #2, right lower leg cluster, venous, full thickness, onset 6/1/2020-Wound Width (cm): 20 cm    Wound 06/18/20 #1, left lower leg cluster, venous, partial thickness, onset 6/1/2020-Wound Depth (cm): 0.1 cm  Wound 06/18/20 #2, right lower leg cluster, venous, full thickness, onset 6/1/2020-Wound Depth (cm): 0.2 cm    Assessment:     Patient Active Problem List   Diagnosis Code    Type 2 diabetes mellitus with diabetic polyneuropathy, with long-term current use of insulin ([facility]) E11.42, Z79.4    Essential hypertension I10    CLINT (obstructive sleep apnea) G47.33    Tobacco abuse disorder Z72.0    GERD (gastroesophageal reflux disease) K21.9    Anxiety and depression F41.9, F32.9    Hyponatremia E87.1    Iron deficiency anemia D50.9    CAD (coronary artery disease) I25.10    Mitral valve regurgitation I34.0    Stage 3 chronic kidney disease (HCC) N18.3    Claudication in peripheral vascular disease (HCC) I73.9    COPD (chronic obstructive pulmonary disease) (HCC) J44.9    Vitamin D deficiency E55.9    Dyslipidemia E78.5    Abel's esophagus K22.70    Viral hepatitis C B19.20    Pulmonary nodule R91.1    Class 2 severe obesity due to excess calories with serious comorbidity and body mass index (BMI) of 39.0 to 39.9 in adult (HCC) E66.01, Z68.39    Bipolar disorder (HCC) F31.9    Pulmonary hypertension (HCC) I27.20

## 2020-06-26 NOTE — TELEPHONE ENCOUNTER
Office of NP [place] calling because they received critical lab results on a patient of theirs today, however, Roslyn Haley NP ordered the labs. SARAH Noriega would like NP Roslyn Haley to notify patient with any new instructions. Called to [facility] office. Labs will be printed and given to Roslyn Haley NP to address immediately. Labs are in epic. Na 126 Bun 110 Cr 2.7. Notified [employer] that critical labs must be addressed today as we are heading into weekend.

## 2020-06-26 NOTE — TELEPHONE ENCOUNTER
Labs reviewed. Called patient. She was hypotensive today during rubi with vascular. Some dizziness  Very dehydrated. Hold any further metolazone for now. Hold torsemide tomorrow, and then only take 1/2 tab on Sunday  Hold Spironolactone (aldactone) tomorrow as well  And only take 1/2 dose of potassium for sat and sun. Adjust midodrine 7.5mg TID     Repeat labs on Monday    All instructions given to patient and verbalized understanding   Instructed if not improving to go to the ED.

## 2020-06-27 NOTE — PROGRESS NOTES
APPLICATION, 5 LEVEL BILATERAL INTERCOSTAL NERVE BLOCK, STERNAL PLATING performed by Marisela Quiñonez MD at [address] Right 2019    fem-pop, performed by Yarelis Douglas MD at [address]  2019    CardioMEMs insertion for CHF    ROTATOR CUFF REPAIR Left 2016    TONSILLECTOMY      TRANSLUMINAL ANGIOPLASTY Left 10/08/2019    with stenting, at [facility]    TRANSLUMINAL ANGIOPLASTY Left 2020    of the [facility] re-occlusion    TUMOR EXCISION      benign tumors X 2 chest & axilla    UPPER GASTROINTESTINAL ENDOSCOPY  2013    BX barretts, HH, gastritis    UPPER GASTROINTESTINAL ENDOSCOPY  12/10/2015    UPPER GASTROINTESTINAL ENDOSCOPY  2017    Gastritis    VASCULAR SURGERY Left 2015    upper extremity and mesenteric angiogram (diagnostic only)     Social History:    Social History     Tobacco Use   Smoking Status Current Some Day Smoker    Packs/day: 0.50    Years: 30.00    Pack years: 15.00    Types: Cigarettes    Last attempt to quit: 2019    Years since quittin.9   Smokeless Tobacco Never Used   Tobacco Comment    trying to quit     Current Medications:  Current Outpatient Medications   Medication Sig Dispense Refill    metOLazone (ZAROXOLYN) 2.5 MG tablet Take 1 tablet by mouth See Admin Instructions Take every Monday, Wednesday, Friday      torsemide (DEMADEX) 100 MG tablet Take 1 tablet by mouth daily 100 mg daily except 50 mg twice weekly (Monday and Thursday).       Insulin Degludec (TRESIBA FLEXTOUCH) 100 UNIT/ML SOPN Inject 30 Units into the skin nightly 10 pen 5    insulin lispro, 1 Unit Dial, 100 UNIT/ML SOPN Inject 10 Units into the skin 3 times daily 3 pen 2    potassium chloride (KLOR-CON M) 20 MEQ extended release tablet Take 2 tablets by mouth 4 times daily 240 tablet 3    spironolactone (ALDACTONE) 25 MG tablet Take 1 tablet by mouth daily 30 tablet 3    atorvastatin (LIPITOR) 80 MG tablet Take 1 tablet

## 2020-06-29 ENCOUNTER — TELEPHONE (OUTPATIENT)
Dept: CARDIOLOGY CLINIC | Age: 55
End: 2020-06-29

## 2020-06-29 ENCOUNTER — HOSPITAL ENCOUNTER (INPATIENT)
Age: 55
LOS: 4 days | Discharge: HOME | DRG: 194 | End: 2020-07-03
Attending: EMERGENCY MEDICINE | Admitting: PEDIATRICS
Payer: COMMERCIAL

## 2020-06-29 ENCOUNTER — TELEPHONE (OUTPATIENT)
Dept: OTHER | Facility: CLINIC | Age: 55
End: 2020-06-29

## 2020-06-29 ENCOUNTER — APPOINTMENT (OUTPATIENT)
Dept: GENERAL RADIOLOGY | Age: 55
DRG: 194 | End: 2020-06-29
Payer: COMMERCIAL

## 2020-06-29 ENCOUNTER — HOSPITAL ENCOUNTER (OUTPATIENT)
Age: 55
Discharge: HOME | End: 2020-06-29
Payer: COMMERCIAL

## 2020-06-29 PROBLEM — N17.9 ACUTE ON CHRONIC RENAL FAILURE (HCC): Status: ACTIVE | Noted: 2020-06-29

## 2020-06-29 PROBLEM — Z99.2 ACUTE RENAL FAILURE SUPERIMPOSED ON CHRONIC KIDNEY DISEASE, ON CHRONIC DIALYSIS (HCC): Status: ACTIVE | Noted: 2020-06-29

## 2020-06-29 PROBLEM — E87.5 HYPERKALEMIA: Status: ACTIVE | Noted: 2020-06-29

## 2020-06-29 PROBLEM — N17.9 ACUTE RENAL FAILURE SUPERIMPOSED ON CHRONIC KIDNEY DISEASE, ON CHRONIC DIALYSIS (HCC): Status: ACTIVE | Noted: 2020-06-29

## 2020-06-29 PROBLEM — N18.9 ACUTE RENAL FAILURE SUPERIMPOSED ON CHRONIC KIDNEY DISEASE, ON CHRONIC DIALYSIS (HCC): Status: ACTIVE | Noted: 2020-06-29

## 2020-06-29 PROBLEM — N18.9 ACUTE ON CHRONIC RENAL FAILURE (HCC): Status: ACTIVE | Noted: 2020-06-29

## 2020-06-29 LAB
A/G RATIO: 1.1 (ref 1.1–2.2)
ALBUMIN SERPL-MCNC: 4 G/DL (ref 3.4–5)
ALP BLD-CCNC: 207 U/L (ref 40–129)
ALT SERPL-CCNC: 15 U/L (ref 10–40)
ANION GAP SERPL CALCULATED.3IONS-SCNC: 14 MMOL/L (ref 3–16)
ANION GAP SERPL CALCULATED.3IONS-SCNC: 15 MMOL/L (ref 3–16)
AST SERPL-CCNC: 19 U/L (ref 15–37)
BASOPHILS ABSOLUTE: 0.1 K/UL (ref 0–0.2)
BASOPHILS RELATIVE PERCENT: 1.3 %
BILIRUB SERPL-MCNC: 0.7 MG/DL (ref 0–1)
BILIRUBIN URINE: NEGATIVE
BLOOD, URINE: NEGATIVE
BUN BLDV-MCNC: 133 MG/DL (ref 7–20)
BUN BLDV-MCNC: 136 MG/DL (ref 7–20)
CALCIUM SERPL-MCNC: 9.5 MG/DL (ref 8.3–10.6)
CALCIUM SERPL-MCNC: 9.6 MG/DL (ref 8.3–10.6)
CHLORIDE BLD-SCNC: 87 MMOL/L (ref 99–110)
CHLORIDE BLD-SCNC: 90 MMOL/L (ref 99–110)
CLARITY: CLEAR
CO2: 23 MMOL/L (ref 21–32)
CO2: 25 MMOL/L (ref 21–32)
COLOR: YELLOW
CREAT SERPL-MCNC: 3.9 MG/DL (ref 0.6–1.1)
CREAT SERPL-MCNC: 4.1 MG/DL (ref 0.6–1.1)
EOSINOPHILS ABSOLUTE: 0.1 K/UL (ref 0–0.6)
EOSINOPHILS RELATIVE PERCENT: 1 %
GFR AFRICAN AMERICAN: 14
GFR AFRICAN AMERICAN: 14
GFR NON-AFRICAN AMERICAN: 11
GFR NON-AFRICAN AMERICAN: 12
GLOBULIN: 3.6 G/DL
GLUCOSE BLD-MCNC: 59 MG/DL (ref 70–99)
GLUCOSE BLD-MCNC: 84 MG/DL (ref 70–99)
GLUCOSE URINE: NEGATIVE MG/DL
HCT VFR BLD CALC: 37 % (ref 36–48)
HEMOGLOBIN: 11.7 G/DL (ref 12–16)
KETONES, URINE: NEGATIVE MG/DL
LEUKOCYTE ESTERASE, URINE: NEGATIVE
LYMPHOCYTES ABSOLUTE: 0.7 K/UL (ref 1–5.1)
LYMPHOCYTES RELATIVE PERCENT: 9 %
MAGNESIUM: 3.5 MG/DL (ref 1.8–2.4)
MCH RBC QN AUTO: 24.1 PG (ref 26–34)
MCHC RBC AUTO-ENTMCNC: 31.5 G/DL (ref 31–36)
MCV RBC AUTO: 76.5 FL (ref 80–100)
MICROSCOPIC EXAMINATION: NORMAL
MONOCYTES ABSOLUTE: 0.5 K/UL (ref 0–1.3)
MONOCYTES RELATIVE PERCENT: 6 %
NEUTROPHILS ABSOLUTE: 6.6 K/UL (ref 1.7–7.7)
NEUTROPHILS RELATIVE PERCENT: 82.7 %
NITRITE, URINE: NEGATIVE
PDW BLD-RTO: 19.7 % (ref 12.4–15.4)
PH UA: 6 (ref 5–8)
PLATELET # BLD: 395 K/UL (ref 135–450)
PMV BLD AUTO: 8.8 FL (ref 5–10.5)
POTASSIUM SERPL-SCNC: 6.1 MMOL/L (ref 3.5–5.1)
POTASSIUM SERPL-SCNC: 6.5 MMOL/L (ref 3.5–5.1)
PRO-BNP: 3001 PG/ML (ref 0–124)
PRO-BNP: 3163 PG/ML (ref 0–124)
PROTEIN UA: NEGATIVE MG/DL
RBC # BLD: 4.84 M/UL (ref 4–5.2)
SODIUM BLD-SCNC: 126 MMOL/L (ref 136–145)
SODIUM BLD-SCNC: 128 MMOL/L (ref 136–145)
SPECIFIC GRAVITY UA: 1.01 (ref 1–1.03)
TOTAL PROTEIN: 7.6 G/DL (ref 6.4–8.2)
TROPONIN: 0.06 NG/ML
URINE TYPE: NORMAL
UROBILINOGEN, URINE: 0.2 E.U./DL
WBC # BLD: 8 K/UL (ref 4–11)

## 2020-06-29 PROCEDURE — 2580000003 HC RX 258: Performed by: PHYSICIAN ASSISTANT

## 2020-06-29 PROCEDURE — 6370000000 HC RX 637 (ALT 250 FOR IP): Performed by: PHYSICIAN ASSISTANT

## 2020-06-29 PROCEDURE — 99285 EMERGENCY DEPT VISIT HI MDM: CPT

## 2020-06-29 PROCEDURE — 96372 THER/PROPH/DIAG INJ SC/IM: CPT

## 2020-06-29 PROCEDURE — 2500000003 HC RX 250 WO HCPCS: Performed by: PHYSICIAN ASSISTANT

## 2020-06-29 PROCEDURE — 83880 ASSAY OF NATRIURETIC PEPTIDE: CPT

## 2020-06-29 PROCEDURE — 71045 X-RAY EXAM CHEST 1 VIEW: CPT

## 2020-06-29 PROCEDURE — 36415 COLL VENOUS BLD VENIPUNCTURE: CPT

## 2020-06-29 PROCEDURE — 80048 BASIC METABOLIC PNL TOTAL CA: CPT

## 2020-06-29 PROCEDURE — 81003 URINALYSIS AUTO W/O SCOPE: CPT

## 2020-06-29 PROCEDURE — 83735 ASSAY OF MAGNESIUM: CPT

## 2020-06-29 PROCEDURE — 96365 THER/PROPH/DIAG IV INF INIT: CPT

## 2020-06-29 PROCEDURE — 2060000000 HC ICU INTERMEDIATE R&B

## 2020-06-29 PROCEDURE — 84484 ASSAY OF TROPONIN QUANT: CPT

## 2020-06-29 PROCEDURE — 93005 ELECTROCARDIOGRAM TRACING: CPT | Performed by: PHYSICIAN ASSISTANT

## 2020-06-29 PROCEDURE — 85025 COMPLETE CBC W/AUTO DIFF WBC: CPT

## 2020-06-29 PROCEDURE — 80053 COMPREHEN METABOLIC PANEL: CPT

## 2020-06-29 PROCEDURE — 93005 ELECTROCARDIOGRAM TRACING: CPT | Performed by: EMERGENCY MEDICINE

## 2020-06-29 RX ORDER — HEPARIN SODIUM 5000 [USP'U]/ML
5000 INJECTION, SOLUTION INTRAVENOUS; SUBCUTANEOUS EVERY 8 HOURS SCHEDULED
Status: DISCONTINUED | OUTPATIENT
Start: 2020-06-29 | End: 2020-07-03 | Stop reason: HOSPADM

## 2020-06-29 RX ORDER — BENZTROPINE MESYLATE 1 MG/1
1 TABLET ORAL NIGHTLY
Status: DISCONTINUED | OUTPATIENT
Start: 2020-06-29 | End: 2020-07-03 | Stop reason: HOSPADM

## 2020-06-29 RX ORDER — ARIPIPRAZOLE 10 MG/1
10 TABLET ORAL DAILY
Status: DISCONTINUED | OUTPATIENT
Start: 2020-06-30 | End: 2020-07-03 | Stop reason: HOSPADM

## 2020-06-29 RX ORDER — SODIUM CHLORIDE 0.9 % (FLUSH) 0.9 %
10 SYRINGE (ML) INJECTION EVERY 12 HOURS SCHEDULED
Status: DISCONTINUED | OUTPATIENT
Start: 2020-06-29 | End: 2020-07-03 | Stop reason: HOSPADM

## 2020-06-29 RX ORDER — ACETAMINOPHEN 325 MG/1
650 TABLET ORAL EVERY 6 HOURS PRN
Status: DISCONTINUED | OUTPATIENT
Start: 2020-06-29 | End: 2020-07-03 | Stop reason: HOSPADM

## 2020-06-29 RX ORDER — CALCIUM GLUCONATE 94 MG/ML
1 INJECTION, SOLUTION INTRAVENOUS ONCE
Status: DISCONTINUED | OUTPATIENT
Start: 2020-06-29 | End: 2020-06-29 | Stop reason: CLARIF

## 2020-06-29 RX ORDER — SODIUM CHLORIDE 0.9 % (FLUSH) 0.9 %
10 SYRINGE (ML) INJECTION PRN
Status: DISCONTINUED | OUTPATIENT
Start: 2020-06-29 | End: 2020-07-03 | Stop reason: HOSPADM

## 2020-06-29 RX ORDER — POLYETHYLENE GLYCOL 3350 17 G/17G
17 POWDER, FOR SOLUTION ORAL DAILY PRN
Status: DISCONTINUED | OUTPATIENT
Start: 2020-06-29 | End: 2020-07-03 | Stop reason: HOSPADM

## 2020-06-29 RX ORDER — NICOTINE POLACRILEX 4 MG
15 LOZENGE BUCCAL PRN
Status: DISCONTINUED | OUTPATIENT
Start: 2020-06-29 | End: 2020-07-03 | Stop reason: HOSPADM

## 2020-06-29 RX ORDER — PROMETHAZINE HYDROCHLORIDE 25 MG/1
12.5 TABLET ORAL EVERY 6 HOURS PRN
Status: DISCONTINUED | OUTPATIENT
Start: 2020-06-29 | End: 2020-07-03 | Stop reason: HOSPADM

## 2020-06-29 RX ORDER — SODIUM CHLORIDE 9 MG/ML
1000 INJECTION, SOLUTION INTRAVENOUS CONTINUOUS
Status: DISCONTINUED | OUTPATIENT
Start: 2020-06-29 | End: 2020-06-30

## 2020-06-29 RX ORDER — DEXTROSE MONOHYDRATE 25 G/50ML
25 INJECTION, SOLUTION INTRAVENOUS ONCE
Status: COMPLETED | OUTPATIENT
Start: 2020-06-29 | End: 2020-06-29

## 2020-06-29 RX ORDER — SODIUM POLYSTYRENE SULFONATE 15 G/60ML
30 SUSPENSION ORAL; RECTAL ONCE
Status: COMPLETED | OUTPATIENT
Start: 2020-06-29 | End: 2020-06-29

## 2020-06-29 RX ORDER — BUSPIRONE HYDROCHLORIDE 5 MG/1
30 TABLET ORAL 2 TIMES DAILY
Status: DISCONTINUED | OUTPATIENT
Start: 2020-06-30 | End: 2020-07-03 | Stop reason: HOSPADM

## 2020-06-29 RX ORDER — BUPRENORPHINE AND NALOXONE 8; 2 MG/1; MG/1
1 FILM, SOLUBLE BUCCAL; SUBLINGUAL 2 TIMES DAILY
Status: DISCONTINUED | OUTPATIENT
Start: 2020-06-29 | End: 2020-07-03 | Stop reason: HOSPADM

## 2020-06-29 RX ORDER — LAMOTRIGINE 100 MG/1
150 TABLET ORAL 2 TIMES DAILY
Status: DISCONTINUED | OUTPATIENT
Start: 2020-06-29 | End: 2020-07-03 | Stop reason: HOSPADM

## 2020-06-29 RX ORDER — CLOPIDOGREL BISULFATE 75 MG/1
75 TABLET ORAL DAILY
Status: DISCONTINUED | OUTPATIENT
Start: 2020-06-30 | End: 2020-07-03 | Stop reason: HOSPADM

## 2020-06-29 RX ORDER — PANTOPRAZOLE SODIUM 40 MG/1
40 TABLET, DELAYED RELEASE ORAL 2 TIMES DAILY
Status: DISCONTINUED | OUTPATIENT
Start: 2020-06-30 | End: 2020-07-03 | Stop reason: HOSPADM

## 2020-06-29 RX ORDER — CALCIUM GLUCONATE 20 MG/ML
1 INJECTION, SOLUTION INTRAVENOUS ONCE
Status: COMPLETED | OUTPATIENT
Start: 2020-06-29 | End: 2020-06-29

## 2020-06-29 RX ORDER — ATORVASTATIN CALCIUM 80 MG/1
80 TABLET, FILM COATED ORAL NIGHTLY
Status: DISCONTINUED | OUTPATIENT
Start: 2020-06-29 | End: 2020-07-03 | Stop reason: HOSPADM

## 2020-06-29 RX ORDER — ONDANSETRON 2 MG/ML
4 INJECTION INTRAMUSCULAR; INTRAVENOUS EVERY 6 HOURS PRN
Status: DISCONTINUED | OUTPATIENT
Start: 2020-06-29 | End: 2020-07-03 | Stop reason: HOSPADM

## 2020-06-29 RX ORDER — MIDODRINE HYDROCHLORIDE 5 MG/1
7.5 TABLET ORAL
Status: DISCONTINUED | OUTPATIENT
Start: 2020-06-30 | End: 2020-07-03 | Stop reason: HOSPADM

## 2020-06-29 RX ORDER — DEXTROSE MONOHYDRATE 25 G/50ML
12.5 INJECTION, SOLUTION INTRAVENOUS PRN
Status: DISCONTINUED | OUTPATIENT
Start: 2020-06-29 | End: 2020-07-03 | Stop reason: HOSPADM

## 2020-06-29 RX ORDER — FUROSEMIDE 10 MG/ML
40 INJECTION INTRAMUSCULAR; INTRAVENOUS ONCE
Status: DISCONTINUED | OUTPATIENT
Start: 2020-06-29 | End: 2020-06-29

## 2020-06-29 RX ORDER — MIDODRINE HYDROCHLORIDE 5 MG/1
7.5 TABLET ORAL 3 TIMES DAILY
Status: DISCONTINUED | OUTPATIENT
Start: 2020-06-29 | End: 2020-06-29

## 2020-06-29 RX ORDER — DEXTROSE MONOHYDRATE 50 MG/ML
100 INJECTION, SOLUTION INTRAVENOUS PRN
Status: DISCONTINUED | OUTPATIENT
Start: 2020-06-29 | End: 2020-07-03 | Stop reason: HOSPADM

## 2020-06-29 RX ORDER — ACETAMINOPHEN 650 MG/1
650 SUPPOSITORY RECTAL EVERY 6 HOURS PRN
Status: DISCONTINUED | OUTPATIENT
Start: 2020-06-29 | End: 2020-07-03 | Stop reason: HOSPADM

## 2020-06-29 RX ORDER — ASPIRIN 81 MG/1
81 TABLET ORAL DAILY
Status: DISCONTINUED | OUTPATIENT
Start: 2020-06-30 | End: 2020-07-03 | Stop reason: HOSPADM

## 2020-06-29 RX ADMIN — SODIUM POLYSTYRENE SULFONATE 30 G: 15 SUSPENSION ORAL; RECTAL at 19:20

## 2020-06-29 RX ADMIN — CALCIUM GLUCONATE 1 G: 20 INJECTION, SOLUTION INTRAVENOUS at 18:04

## 2020-06-29 RX ADMIN — DEXTROSE MONOHYDRATE 25 G: 25 INJECTION, SOLUTION INTRAVENOUS at 18:03

## 2020-06-29 RX ADMIN — INSULIN HUMAN 10 UNITS: 100 INJECTION, SOLUTION PARENTERAL at 18:04

## 2020-06-29 RX ADMIN — SODIUM CHLORIDE 1000 ML: 9 INJECTION, SOLUTION INTRAVENOUS at 19:19

## 2020-06-29 ASSESSMENT — PAIN SCALES - GENERAL
PAINLEVEL_OUTOF10: 4
PAINLEVEL_OUTOF10: 2

## 2020-06-29 ASSESSMENT — PAIN DESCRIPTION - LOCATION: LOCATION: HEAD

## 2020-06-29 ASSESSMENT — PAIN DESCRIPTION - PAIN TYPE: TYPE: ACUTE PAIN

## 2020-06-29 NOTE — ED PROVIDER NOTES
I independently performed a history and physical on Bank of Xiao. All diagnostic, treatment, and disposition decisions were made by myself in conjunction with the advanced practice provider.     -Bank of Xiao is a 62 y.o. female presents to ED for CKD and elevated potassium. By her PCP and her blood drawn and was called today for critical labs and to go to the ED for evaluation. Patient reports she has not been feeling well x 1 week. Denies cp, fever, cough. Reports dyspnea on exertion due to fluid overload, lower extremity swelling for weeks.   -Upon arrival patient was hypotensive at 86/55, afebrile and satting 96% on room air. -PE: well appearing, nontoxic, not in acute distress. RRR, +wheezing, abdomen soft, ND, NTTP, + BS x 4, no rigidity, rebound, guarding, 2+ lateral pitting edema  -lab workup significant for: Elevated potassium of 6.5, creatinine of 4.1 magnesium of 3.5, elevated proBNP of 3100 with troponin 0 0.06. Negative leukocytosis,  -Cxr: Negative portable study  -Ua: Negative for acute infection  -The Ekg interpreted by me shows  sinus bradycardia, rate=53   Axis is   Left axis deviation  QTc is  456  Intervals and Durations are unremarkable. ST Segments: no acute change  No significant change from prior EKG dated 6/3/2020  -Patient was given treatment for hyper kalemia including calcium gluconate, insulin, dextrose and Kayexalate.  -Plan for admission for further workup and treatment discussed with patient  in agreement with plan and have no further questions/concerns    For further details of Vishal Scruggs emergency department encounter, please see [facility] documentation.         Zuly Macedo MD  06/29/20 2018

## 2020-06-29 NOTE — H&P
Hospital Medicine History & Physical      PCP: Nell Garcia PA-C    Date of Admission: 6/29/2020    Date of Service: Pt seen/examined on 6/29/2020    Chief Complaint: My cardiology practitioner told me to come in      History Of Present Illness:    62 y.o. female with multiple medical problems as below in past medical history presents to [facility] after being instructed to come in by her cardiology nurse practitioner after lab draw demonstrated multiple abnormalities including acute kidney injury on top of her chronic kidney injury, hyperkalemia and hypomagnesemia. The patient herself states that she is at her baseline. She has some shortness of breath which is unchanged. She has chronic lower extremity edema including nonhealing ulcers on bilateral legs worse on right than left. She has not had any chest pain, palpitations, increased weakness, syncope or near syncope. She states she has been compliant with all medications. No change in urine output. No hematuria. Per emergency department midlevel discussions were had with on-call nephrologist Dr. Yusef Dukes. Recommendations were for IV fluids and acute temporizing measures. Patient does typically follow with Dr. Paul Obregon of nephrology.     Past Medical History:        Diagnosis Date    Acute kidney injury ([address] 12/25/2019    Acute on chronic congestive heart failure (HCC)     Alcohol dependence ([address] 3/10/2010    Cellulitis 6/3/2020    Diabetic ulcer of left foot associated with type 2 diabetes mellitus ([address] 1/18/2020    Diabetic ulcer of toe of right foot associated with type 2 diabetes mellitus ([address] 1/18/2020    MI (myocardial infarction) ([address]     Positive FIT (fecal immunochemical test) 2/28/2020       Past Surgical History:        Procedure Laterality Date    ARTERIAL BYPASS SURGRY Left 01/06/2016    Axillary/Subclavian to Brachial Bypass w/reversed SVG    CARDIAC CATHETERIZATION  03/27/2018    Dr. Jonas Siddiqi - at [facility]    CARDIAC CATHETERIZATION  01/20/2020    Dr. Christine Ren      pancreatitis post surgery    CORONARY ANGIOPLASTY WITH STENT PLACEMENT  03/16/2018    MELODY- 3.5 x 38 and 3.5 x 20 to Cx    CORONARY ANGIOPLASTY WITH STENT PLACEMENT  01/03/2018    MELODY- 3.0 x 38 and 2.75 x 26 and 2.75 x 8 and 2.75 x 22 to the LAD    CORONARY ARTERY BYPASS GRAFT N/A 1/27/2020    CORONARY ARTERY BYPASS GRAFTING X 1, ON PUMP BEATING HEART, INTERNAL MAMMARY ARTERY TO LEFT ANTERIOR DESCENDING ARTERY, VAS CATH INSERTION, URI, PLATELET GEL APPLICATION, 5 LEVEL BILATERAL INTERCOSTAL NERVE BLOCK, STERNAL PLATING performed by Sterling Vega MD at [address] [facility] [address] Right 5/16/2019    fem-pop, performed by Tamiko Agudelo MD at [address] [facility]  02/14/2019    CardioMEMs insertion for CHF    ROTATOR CUFF REPAIR Left 04/22/2016    TONSILLECTOMY      TRANSLUMINAL ANGIOPLASTY Left 10/08/2019    with stenting, at [facility]    TRANSLUMINAL ANGIOPLASTY Left 04/29/2020    of the [facility] re-occlusion    TUMOR EXCISION      benign tumors X 2 chest & axilla    UPPER GASTROINTESTINAL ENDOSCOPY  4/25/2013    BX barretts, BAYLEE, gastritis    UPPER GASTROINTESTINAL ENDOSCOPY  12/10/2015    UPPER GASTROINTESTINAL ENDOSCOPY  01/06/2017    Gastritis    VASCULAR SURGERY Left 12/08/2015    upper extremity and mesenteric angiogram (diagnostic only)       Medications Prior to Admission:   Prior to Admission medications    Medication Sig Start Date End Date Taking? Authorizing Provider   midodrine (PROAMATINE) 5 MG tablet Take 1.5 tablets by mouth 3 times daily 6/26/20  Yes TRAY Figueroa - CNP   metOLazone (ZAROXOLYN) 2.5 MG tablet Take 1 tablet by mouth See Admin Instructions Take every Monday, Wednesday, Friday 6/7/20  Yes Linda Vasquez MD   torsemide (DEMADEX) 100 MG tablet Take 1 tablet by mouth daily 100 mg daily except 50 mg twice weekly (Monday and Thursday).  6/7/20  Yes Malachi Cisneros MD   Insulin Degludec [facility]) 100 UNIT/ML SOPN Inject 30 Units into the skin nightly 5/27/20  Yes TRAY Lawson CNP   insulin lispro, 1 Unit Dial, 100 UNIT/ML SOPN Inject 10 Units into the skin 3 times daily 5/20/20  Yes TRAY Lawson CNP   potassium chloride (KLOR-CON M) 20 MEQ extended release tablet Take 2 tablets by mouth 4 times daily 4/15/20  Yes TRAY Almeida CNP   spironolactone (ALDACTONE) 25 MG tablet Take 1 tablet by mouth daily 4/3/20  Yes David Thomas MD   atorvastatin (LIPITOR) 80 MG tablet Take 1 tablet by mouth nightly 2/28/20  Yes TRAY Almeida CNP   tiotropium (SPIRIVA RESPIMAT) 2.5 MCG/ACT AERS inhaler INHALE 2 PUFFS INTO THE LUNGS DAILY 2/25/20  Yes Daniel Pappas MD   buprenorphine-naloxone (SUBOXONE) 8-2 MG FILM SL film Place 1 Film under the tongue 2 times daily. Yes Historical Provider, MD   albuterol sulfate  (90 Base) MCG/ACT inhaler Inhale 2 puffs into the lungs every 6 hours as needed for Wheezing or Shortness of Breath   Yes Historical Provider, MD   benztropine (COGENTIN) 1 MG tablet Take 1 mg by mouth nightly  12/13/19  Yes Historical Provider, MD   blood glucose test strips (EXACTECH TEST) strip 6 times daily.  12/19/19  Yes TRAY Lawson CNP   Insulin Pen Needle (B-D ULTRAFINE III SHORT PEN) 31G X 8 MM MISC Five shots a day 12/3/19  Yes TRAY Lawson CNP   INSULIN SYRINGE .5CC/29G 29G X 1/2\" 0.5 ML MISC 1 each by Does not apply route daily 12/3/19  Yes TRAY Lawson CNP   Liraglutide (VICTOZA) 18 MG/3ML SOPN SC injection Inject 1.2 mg into the skin daily 10/29/19  Yes TRAY Lawson CNP   pantoprazole (PROTONIX) 40 MG tablet Take 1 tablet by mouth 2 times daily 10/1/19  Yes Leidy Shelby MD   clopidogrel (PLAVIX) 75 MG tablet TAKE 1 TABLET BY MOUTH EVERY DAY 9/3/19  Yes TRAY Almeida - CNP   magnesium oxide (MAG-OX) 400 (240 Mg) MG tablet TAKE 1 TABLET ONCE DAILY 5/1/19  Yes Luisito Cordero MD   busPIRone (BUSPAR) 30 MG tablet Take 30 mg by mouth 2 times daily  18  Yes Historical Provider, MD   aspirin EC 81 MG EC tablet Take 1 tablet by mouth daily 16  Yes Betty Ruffin MD   ARIPiprazole (ABILIFY) 10 MG tablet Take 10 mg by mouth daily    Yes Historical Provider, MD   lamoTRIgine (LAMICTAL) 150 MG tablet Take 150 mg by mouth 2 times daily    Yes Historical Provider, MD       Allergies:  Lisinopril    Social History:    Social History     Socioeconomic History    Marital status: Single     Spouse name: None    Number of children: None    Years of education: None    Highest education level: None   Occupational History    None   Social Needs    Financial resource strain: None    Food insecurity     Worry: None     Inability: None    Transportation needs     Medical: None     Non-medical: None   Tobacco Use    Smoking status: Current Some Day Smoker     Packs/day: 0.50     Years: 30.00     Pack years: 15.00     Types: Cigarettes     Last attempt to quit: 2019     Years since quittin.9    Smokeless tobacco: Never Used    Tobacco comment: trying to quit   Substance and Sexual Activity    Alcohol use: No     Comment: quit      Drug use: Never    Sexual activity: Yes     Partners: Male   Lifestyle    Physical activity     Days per week: None     Minutes per session: None    Stress: None   Relationships    Social connections     Talks on phone: None     Gets together: None     Attends Mormon service: None     Active member of club or organization: None     Attends meetings of clubs or organizations: None     Relationship status: None    Intimate partner violence     Fear of current or ex partner: None     Emotionally abused: None     Physically abused: None     Forced sexual activity: None   Other Topics Concern    None   Social History Narrative    None     Family History:      Positive as follows:        Problem Relation Age of Onset    Heart Disease Component Value Date    NITRU Negative 06/29/2020    WBCUA 3-5 09/28/2019    BACTERIA Rare 09/28/2019    RBCUA None seen 09/28/2019    BLOODU Negative 06/29/2020    SPECGRAV 1.015 06/29/2020    GLUCOSEU Negative 06/29/2020       EKG:    I have reviewed the EKG with the following interpretation: Normal sinus rhythm    Radiology:     XR CHEST PORTABLE   Final Result   Negative portable study. ASSESSMENT/PLAN:    Acute kidney injury on chronic kidney disease stage III  Baseline creatinine of 1.5 with GFR of 39. Currently creatinine of 4.9 with estimated GFR of 19.  -Gentle hydration with normal saline at 100 mL an hour  -Repeat BMP in morning  -Nephrology consult    Hyperkalemia, 6.5  In emergency department patient received calcium gluconate, dextrose and insulin, Lasix IV 40 mg, and Kayexalate 30 mg. No EKG findings. -Gentle hydration with normal saline  -Hold potassium supplementation  -Repeat BMP in 6 hours for reevaluation  -Telemetry monitoring    Hypomagnesemia  Asymptomatic  -Hold magnesium supplementation  -Repeat BMP    Chronic hyponatremia  At baseline of 125    Type 2 diabetes mellitus on insulin  Adequate glycemia currently  -Lantus 20 units at bedtime, 5 units AC, low-dose sliding scale insulin    Chronic comorbid conditions not actively managed  Chronic hypotension on Midrine  Chronic heart failure with reduced ejection fraction  Coronary artery disease  COPD      DVT Prophylaxis: Subcutaneous heparin  Diet: Controlled carbohydrate  Code Status: Full code       Dispo -admitting overnight with plan greater than 2 midnight stay. Treatment of hyperkalemia and acute on chronic kidney disease. Nephrology consult       Briana Banuelos MD    Thank you Lizet Garcia PA-C for the opportunity to be involved in this patient's care. If you have any questions or concerns please feel free to contact me at [phone].

## 2020-06-29 NOTE — ED NOTES

## 2020-06-29 NOTE — ED NOTES
Patient is aware that she is being admitted and says she understands POC.       Kelli Dorsey, APRN - CNP  06/29/20 1926

## 2020-06-30 LAB
ANION GAP SERPL CALCULATED.3IONS-SCNC: 13 MMOL/L (ref 3–16)
ANION GAP SERPL CALCULATED.3IONS-SCNC: 16 MMOL/L (ref 3–16)
BUN BLDV-MCNC: 129 MG/DL (ref 7–20)
BUN BLDV-MCNC: 134 MG/DL (ref 7–20)
CALCIUM SERPL-MCNC: 9.5 MG/DL (ref 8.3–10.6)
CALCIUM SERPL-MCNC: 9.8 MG/DL (ref 8.3–10.6)
CHLORIDE BLD-SCNC: 88 MMOL/L (ref 99–110)
CHLORIDE BLD-SCNC: 89 MMOL/L (ref 99–110)
CO2: 22 MMOL/L (ref 21–32)
CO2: 25 MMOL/L (ref 21–32)
CREAT SERPL-MCNC: 3.3 MG/DL (ref 0.6–1.1)
CREAT SERPL-MCNC: 3.6 MG/DL (ref 0.6–1.1)
EKG ATRIAL RATE: 53 BPM
EKG ATRIAL RATE: 54 BPM
EKG DIAGNOSIS: NORMAL
EKG DIAGNOSIS: NORMAL
EKG P AXIS: 68 DEGREES
EKG P AXIS: 70 DEGREES
EKG P-R INTERVAL: 228 MS
EKG P-R INTERVAL: 240 MS
EKG Q-T INTERVAL: 486 MS
EKG Q-T INTERVAL: 492 MS
EKG QRS DURATION: 152 MS
EKG QRS DURATION: 162 MS
EKG QTC CALCULATION (BAZETT): 456 MS
EKG QTC CALCULATION (BAZETT): 466 MS
EKG R AXIS: -71 DEGREES
EKG R AXIS: -74 DEGREES
EKG T AXIS: 73 DEGREES
EKG T AXIS: 82 DEGREES
EKG VENTRICULAR RATE: 53 BPM
EKG VENTRICULAR RATE: 54 BPM
GFR AFRICAN AMERICAN: 16
GFR AFRICAN AMERICAN: 17
GFR NON-AFRICAN AMERICAN: 13
GFR NON-AFRICAN AMERICAN: 14
GLUCOSE BLD-MCNC: 156 MG/DL (ref 70–99)
GLUCOSE BLD-MCNC: 177 MG/DL (ref 70–99)
GLUCOSE BLD-MCNC: 232 MG/DL (ref 70–99)
GLUCOSE BLD-MCNC: 45 MG/DL (ref 70–99)
GLUCOSE BLD-MCNC: 46 MG/DL (ref 70–99)
GLUCOSE BLD-MCNC: 48 MG/DL (ref 70–99)
GLUCOSE BLD-MCNC: 51 MG/DL (ref 70–99)
GLUCOSE BLD-MCNC: 54 MG/DL (ref 70–99)
GLUCOSE BLD-MCNC: 60 MG/DL (ref 70–99)
GLUCOSE BLD-MCNC: 76 MG/DL (ref 70–99)
PERFORMED ON: ABNORMAL
PERFORMED ON: NORMAL
POTASSIUM REFLEX MAGNESIUM: 5.2 MMOL/L (ref 3.5–5.1)
POTASSIUM REFLEX MAGNESIUM: 5.3 MMOL/L (ref 3.5–5.1)
SODIUM BLD-SCNC: 126 MMOL/L (ref 136–145)
SODIUM BLD-SCNC: 127 MMOL/L (ref 136–145)

## 2020-06-30 PROCEDURE — 80048 BASIC METABOLIC PNL TOTAL CA: CPT

## 2020-06-30 PROCEDURE — 93010 ELECTROCARDIOGRAM REPORT: CPT | Performed by: INTERNAL MEDICINE

## 2020-06-30 PROCEDURE — 2580000003 HC RX 258: Performed by: INTERNAL MEDICINE

## 2020-06-30 PROCEDURE — 6370000000 HC RX 637 (ALT 250 FOR IP): Performed by: INTERNAL MEDICINE

## 2020-06-30 PROCEDURE — 2060000000 HC ICU INTERMEDIATE R&B

## 2020-06-30 PROCEDURE — 6360000002 HC RX W HCPCS: Performed by: INTERNAL MEDICINE

## 2020-06-30 PROCEDURE — 6370000000 HC RX 637 (ALT 250 FOR IP): Performed by: PEDIATRICS

## 2020-06-30 PROCEDURE — 36415 COLL VENOUS BLD VENIPUNCTURE: CPT

## 2020-06-30 PROCEDURE — 94640 AIRWAY INHALATION TREATMENT: CPT

## 2020-06-30 RX ORDER — FUROSEMIDE 10 MG/ML
80 INJECTION INTRAMUSCULAR; INTRAVENOUS ONCE
Status: COMPLETED | OUTPATIENT
Start: 2020-06-30 | End: 2020-06-30

## 2020-06-30 RX ADMIN — ATORVASTATIN CALCIUM 80 MG: 80 TABLET, FILM COATED ORAL at 20:24

## 2020-06-30 RX ADMIN — LAMOTRIGINE 150 MG: 100 TABLET ORAL at 08:54

## 2020-06-30 RX ADMIN — SODIUM CHLORIDE 1000 ML: 9 INJECTION, SOLUTION INTRAVENOUS at 00:45

## 2020-06-30 RX ADMIN — HEPARIN SODIUM 5000 UNITS: 5000 INJECTION INTRAVENOUS; SUBCUTANEOUS at 15:02

## 2020-06-30 RX ADMIN — HEPARIN SODIUM 5000 UNITS: 5000 INJECTION INTRAVENOUS; SUBCUTANEOUS at 20:22

## 2020-06-30 RX ADMIN — ARIPIPRAZOLE 10 MG: 10 TABLET ORAL at 08:54

## 2020-06-30 RX ADMIN — LAMOTRIGINE 150 MG: 100 TABLET ORAL at 20:21

## 2020-06-30 RX ADMIN — BUSPIRONE HYDROCHLORIDE 30 MG: 5 TABLET ORAL at 08:54

## 2020-06-30 RX ADMIN — BUPRENORPHINE AND NALOXONE 1 FILM: 8; 2 FILM BUCCAL; SUBLINGUAL at 20:21

## 2020-06-30 RX ADMIN — INSULIN LISPRO 1 UNITS: 100 INJECTION, SOLUTION INTRAVENOUS; SUBCUTANEOUS at 20:33

## 2020-06-30 RX ADMIN — MIDODRINE HYDROCHLORIDE 7.5 MG: 5 TABLET ORAL at 12:06

## 2020-06-30 RX ADMIN — BUPRENORPHINE AND NALOXONE 1 FILM: 8; 2 FILM BUCCAL; SUBLINGUAL at 00:35

## 2020-06-30 RX ADMIN — HEPARIN SODIUM 5000 UNITS: 5000 INJECTION INTRAVENOUS; SUBCUTANEOUS at 00:34

## 2020-06-30 RX ADMIN — CLOPIDOGREL BISULFATE 75 MG: 75 TABLET ORAL at 08:56

## 2020-06-30 RX ADMIN — BENZTROPINE MESYLATE 1 MG: 1 TABLET ORAL at 20:20

## 2020-06-30 RX ADMIN — HEPARIN SODIUM 5000 UNITS: 5000 INJECTION INTRAVENOUS; SUBCUTANEOUS at 05:12

## 2020-06-30 RX ADMIN — INSULIN LISPRO 2 UNITS: 100 INJECTION, SOLUTION INTRAVENOUS; SUBCUTANEOUS at 17:13

## 2020-06-30 RX ADMIN — BENZTROPINE MESYLATE 1 MG: 1 TABLET ORAL at 00:34

## 2020-06-30 RX ADMIN — PANTOPRAZOLE SODIUM 40 MG: 40 TABLET, DELAYED RELEASE ORAL at 15:02

## 2020-06-30 RX ADMIN — BUSPIRONE HYDROCHLORIDE 30 MG: 5 TABLET ORAL at 20:20

## 2020-06-30 RX ADMIN — TIOTROPIUM BROMIDE INHALATION SPRAY 2 PUFF: 3.12 SPRAY, METERED RESPIRATORY (INHALATION) at 08:00

## 2020-06-30 RX ADMIN — FUROSEMIDE 80 MG: 10 INJECTION, SOLUTION INTRAMUSCULAR; INTRAVENOUS at 20:25

## 2020-06-30 RX ADMIN — Medication 10 ML: at 00:46

## 2020-06-30 RX ADMIN — ASPIRIN 81 MG: 81 TABLET, COATED ORAL at 08:53

## 2020-06-30 RX ADMIN — LAMOTRIGINE 150 MG: 100 TABLET ORAL at 00:34

## 2020-06-30 RX ADMIN — MIDODRINE HYDROCHLORIDE 7.5 MG: 5 TABLET ORAL at 08:54

## 2020-06-30 RX ADMIN — ATORVASTATIN CALCIUM 80 MG: 80 TABLET, FILM COATED ORAL at 00:34

## 2020-06-30 RX ADMIN — BUPRENORPHINE AND NALOXONE 1 FILM: 8; 2 FILM BUCCAL; SUBLINGUAL at 09:00

## 2020-06-30 RX ADMIN — ACETAMINOPHEN 650 MG: 325 TABLET ORAL at 23:32

## 2020-06-30 RX ADMIN — PANTOPRAZOLE SODIUM 40 MG: 40 TABLET, DELAYED RELEASE ORAL at 08:53

## 2020-06-30 RX ADMIN — Medication 10 ML: at 20:00

## 2020-06-30 RX ADMIN — MIDODRINE HYDROCHLORIDE 7.5 MG: 5 TABLET ORAL at 17:17

## 2020-06-30 ASSESSMENT — PAIN SCALES - GENERAL: PAINLEVEL_OUTOF10: 3

## 2020-06-30 ASSESSMENT — PAIN DESCRIPTION - DESCRIPTORS: DESCRIPTORS: HEADACHE

## 2020-06-30 NOTE — CARE COORDINATION
CASE MANAGEMENT INITIAL ASSESSMENT      Reviewed chart and completed assessment with: patient   Explained Case Management role/services. Primary contact information: Ailin Ratliff [phone]    Admit date/status: 6/29/20 Inpatient   Diagnosis: acute renal failure   Is this a Readmission?:  no    Insurance: Nellie 28 required for SNF - Y        3 night stay required -  N    Living arrangements, Adls, care needs, prior to admission: lives in a mobile home with her daughter and friend    Transportation: patient     Durable Medical Equipment at home: none      Services in the home and/or outpatient, prior to admission: none    Dialysis Facility (if applicable)   · Name:  · Address:  · Dialysis Schedule:  · Phone:  · Fax:    PT/OT recs: none    Hospital Exemption Notification (HEN): not initiated    Barriers to discharge: none    Plan/comments: Patient is independent at home. She denies any DME or services at home. Will continue to follow should any needs arise.

## 2020-06-30 NOTE — FLOWSHEET NOTE
06/30/20 0015   Vital Signs   Temp 98 °F (36.7 °C)   Pulse 71   Resp 16   BP 88/62   BP Location Left upper arm   Patient Position Sitting   Level of Consciousness 0   MEWS Score 2   Height and Weight   Height 5' 4\" (1.626 m)   Weight 247 lb 8 oz (112.3 kg)   Weight Method Actual;Standing scale   BSA (Calculated - sq m) 2.25 sq meters   BMI (Calculated) 42.6   Oxygen Therapy   SpO2 96 %   O2 Device None (Room air)   Pt arrived to unit in stable condition. Admission questions complete and assessment performed. Oriented to [id number]/[id number], equipment, admission packet explained, POC addressed with the pt. Bed in locked and in lowest position, call light within reach, and pt states to call out for assistance as needed. Patient denies needs at this time, nurse and staff will continue to monitor throughout shift. Pt ref four eye skin assessment; pt has scattered scabs noticed during admission assessment and admits that she picks at them frequently. Previous venous ulcers noted to BLE, cellulities to BLE with blisters and minimal weeping from previous admission in the beginning of June. BLE tight, slightly reddened, +2 edema. Pt states numbness and tingling to bilateral feet at baseline.

## 2020-06-30 NOTE — PROGRESS NOTES
CardioMEMs pressure readings from today:                 PAD is up to 28-29 today from 21 on 6/27/20. Would consider diuresis initially and monitor response.    TRAY Klein - CNP, 6/30/2020, 3:02 PM

## 2020-06-30 NOTE — CONSULTS
Bilateral lower extremity edema 06/22/2020    Stasis dermatitis of both legs 06/22/2020    Habitual self-excoriation 06/22/2020    Ulcer of left lower leg, limited to breakdown of skin ([address] 06/22/2020    Ulcer of right leg, with fat layer exposed ([address] 06/22/2020    Arthritis     Cardiomyopathy ([address]     Chronic systolic heart failure (HCC) 02/21/2020     Resolved Ambulatory Problems     Diagnosis Date Noted    Epigastric pain 12/04/2015    Left arm pain 12/04/2015    Severe claudication ([address] 12/04/2015    Left arm weakness     Nausea and vomiting     Abdominal pain in female patient     Abdominal pain 12/10/2015    PVD (peripheral vascular disease) [facility] claudication [facility]     Adhesive capsulitis of left shoulder 03/08/2016    Incomplete tear of left rotator cuff 04/01/2016    SHAUNNA (acute kidney injury) ([address] 05/23/2018    Hypotension 05/23/2018    Volume depletion 05/23/2018    CHF (congestive heart failure) ([address] 05/23/2018    Mixed hyperlipidemia     Hypochloremia 05/23/2018    Hypomagnesemia 05/23/2018    Acute hyperglycemia 05/23/2018    Shock ([address] 05/23/2018    MR (mental retardation), severe     Myocardiopathy ([address] 07/08/2019    Postprocedural hypotension 05/26/2018    Palpitations 11/19/2018    DKA, type 1, not at goal [facility]) 04/08/2019    Hypokalemia 04/08/2019    Acute kidney injury superimposed on chronic kidney disease ([address] 04/09/2019    Cellulitis 06/01/2019    SHAUNNA (acute kidney injury) ([address] 09/29/2019    Hypervolemia     Acute kidney injury ([address] 12/25/2019    Cellulitis of both feet 12/26/2019    Acute on chronic systolic congestive heart failure ([address] 01/10/2020    Chest pain 01/17/2020    Peripheral edema 01/17/2020    Diabetic ulcer of toe of right foot associated with type 2 diabetes mellitus ([address] 01/18/2020    Diabetic ulcer of left foot associated with type 2 diabetes mellitus ([address] 01/18/2020    Hypoglycemia associated with type 2 diabetes mellitus ([facility] 01/18/2020    Unstable angina pectoris ([facility]     Cardiomyopathy, ischemic     Acute anxiety 01/26/2020    Acute on chronic systolic heart failure ([facility] 02/21/2020    Hypokalemia 02/28/2020    Congestive heart failure ([facility] 02/28/2020    S/P coronary artery bypass graft x 1 02/28/2020    Positive FIT (fecal immunochemical test) 02/28/2020    History of arterial bypass of lower extremity 02/28/2020    Alcohol dependence ([facility] 03/10/2010    Intractable nausea and vomiting 02/28/2020    Intractable vomiting     Dehydration     CHF (congestive heart failure), NYHA class I, acute on chronic, combined ([facility] 03/20/2020    Left foot pain 04/27/2020    Cellulitis 06/03/2020     Past Medical History:   Diagnosis Date    Acute on chronic congestive heart failure (HCC)     MI (myocardial infarction) ([facility]          Review of Systems     Constitutional:  No weight loss, no fever/chills  Eyes:  No eye pain, no eye redness  Cardiovascular:  No chest pain, + edema  Respiratory:  No hemoptysis, no stridor  Gastrointestinal:  No blood in stool, no n/v, no diarrhea  Genitoruinary:  No hematuria, no difficulty with urination  Musculoskeletal:  No joint swelling, no redness  Integumentary:  No Rash, no itching  Neurological:  No focal weakness, No new sensory deficit  Psychiatric:  No depression, no confusion  Endocrine:  No polyuria, no polydipsia       Medications      Reviewed in EMR     Allergies     Lisinopril      Family History       Negative for Kidney Disease    Social History      Social History     Socioeconomic History    Marital status: Single     Spouse name: None    Number of children: None    Years of education: None    Highest education level: None   Occupational History    None   Social Needs    Financial resource strain: None    Food insecurity     Worry: None     Inability: None    Transportation needs     Medical: None     Non-medical: None   Tobacco Use    Smoking status: Current Every Day Smoker     Packs/day: 1.00     Years: 30.00     Pack years: 30.00     Types: Cigarettes     Last attempt to quit: 2019     Years since quittin.9    Smokeless tobacco: Never Used    Tobacco comment: trying to quit, one cigarette daily   Substance and Sexual Activity    Alcohol use: No     Comment: quit      Drug use: Never    Sexual activity: Yes     Partners: Male   Lifestyle    Physical activity     Days per week: None     Minutes per session: None    Stress: None   Relationships    Social connections     Talks on phone: None     Gets together: None     Attends Jain service: None     Active member of club or organization: None     Attends meetings of clubs or organizations: None     Relationship status: None    Intimate partner violence     Fear of current or ex partner: None     Emotionally abused: None     Physically abused: None     Forced sexual activity: None   Other Topics Concern    None   Social History Narrative    None       Physical Exam     Blood pressure 95/67, pulse 77, temperature 97.1 °F (36.2 °C), temperature source Oral, resp. rate 18, height 5' 4\" (1.626 m), weight 247 lb 8 oz (112.3 kg), last menstrual period 10/24/2012, SpO2 95 %, not currently breastfeeding.     General:  NAD, A+Ox3, ill-appearing  HEENT:  PERRL, EOMI  Neck:  Supple, normal range of movement  Chest:  CTAB, good respiratory effort, good air movement  CV:  Regular, no rub   Abdomen:  NTND, soft, +BS, no hepatosplenomegaly  Extremities:  + peripheral edema  Neurological:  Moving all four extremities, CN II-XII grossly intact  Lymphatics:  No palpable lymph nodes  Skin:  No rash, no jaundice  Psychiatric:  Normal insight and judgement, good recall    Data     Recent Labs     20  1707   WBC 8.0   HGB 11.7*   HCT 37.0   MCV 76.5*        Recent Labs     20  1707 20  2359 20  0443   * 126* 127*   K 6.5* 5.3* 5.2*   CL 87* 88* 89*   CO2 25 25 22   GLUCOSE 84 48* 54*   MG 3.50*  --   --    * 134* 129*   CREATININE 4.1* 3.6* 3.3*   LABGLOM 11* 13* 14*   GFRAA 14* 16* 17*       Assessment:    Acute Kidney Injury:  KDIGO stage 3  - Etiology:  Pre-renal due to decompensated heart failure vs. True volume depletion.  - Data:  Weight is up, she has edema, yet cardiomems readings indicate that she is not fluid overloaded PA diastolic in the 43-12 range     Heart Failure: On room air. Weight is up to 247 lbs. Has LE edema. History of EF = 30% with severe MR    Hyperkalemia:  Due to acute kidney injury     Hyponatremia:  Related to heart failure and renal failure, exam and weight indicate hypervolemic but PA pressures tell a different story      Plan:    Would hold IV fluids  Discussed with cardiology, planning to get a pressure reading to confirm recent trends. Will go with what Cardiomems suggests. Clinically I would say we need to put her on IV lasix. I also do not know if there are additional options from cardiology standpoint. ? Mitral Value repair or Inotrope agent. Follow labs. Patient is hoping to go home. She has not seen a nephrologist in the office and has only been seen in the hospitals. She has progressed. I brought up dialysis to her and she seems very opposed to that option.        Thank you for asking us to participate in the management of your patient, please do not hesitate to contact me for any concerns regarding my recommendations as outlined above.    -----------------------------  Rasta Burton M.D.   [facility]

## 2020-06-30 NOTE — FLOWSHEET NOTE
06/30/20 1256   Encounter Summary   Services provided to: Patient   Referral/Consult From: [facility] Family members   [facility]   (none)   Continue Visiting   (friendly but no pressing needs)   Complexity of Encounter Moderate   Length of Encounter 15 minutes   Spiritual Assessment Completed Yes   Spiritual/Episcopal   Type Spiritual support   Assessment Calm; Approachable; Hopeful;Coping;Peaceful   Intervention Active listening;Explored feelings, thoughts, concerns;Explored coping resources;Nurtured hope;Empowerment   Outcome Comfort;Expressed gratitude;Coping

## 2020-06-30 NOTE — PROGRESS NOTES
midline. Respiratory:  Normal respiratory effort. Clear to auscultation, bilaterally without Rales/Wheezes/Rhonchi. Cardiovascular: Regular rate and rhythm with normal S1/S2 without murmurs, rubs or gallops. Abdomen: Soft, non-tender, non-distended with normal bowel sounds. Musculoskeletal: No clubbing, cyanosis or edema bilaterally. Full range of motion without deformity. Skin: Skin color, texture, turgor normal.  No rashes or lesions. Neurologic:  Neurovascularly intact without any focal sensory/motor deficits. Cranial nerves: II-XII intact, grossly non-focal.  Psychiatric: Alert and oriented, thought content appropriate, normal insight  Capillary Refill: Brisk,< 3 seconds   Peripheral Pulses: +2 palpable, equal bilaterally       Labs:   Recent Labs     06/29/20  1707   WBC 8.0   HGB 11.7*   HCT 37.0        Recent Labs     06/29/20  1707 06/29/20  2359 06/30/20  0443   * 126* 127*   K 6.5* 5.3* 5.2*   CL 87* 88* 89*   CO2 25 25 22   * 134* 129*   CREATININE 4.1* 3.6* 3.3*   CALCIUM 9.6 9.8 9.5     Recent Labs     06/29/20  1707   AST 19   ALT 15   BILITOT 0.7   ALKPHOS 207*     No results for input(s): INR in the last 72 hours. Recent Labs     06/29/20  1707   TROPONINI 0.06*       Urinalysis:      Lab Results   Component Value Date    NITRU Negative 06/29/2020    WBCUA 3-5 09/28/2019    BACTERIA Rare 09/28/2019    RBCUA None seen 09/28/2019    BLOODU Negative 06/29/2020    SPECGRAV 1.015 06/29/2020    GLUCOSEU Negative 06/29/2020       Radiology:  XR CHEST PORTABLE   Final Result   Negative portable study.                  Assessment/Plan:    Active Hospital Problems    Diagnosis    Acute on chronic renal failure (HCC) [N17.9, N18.9]    Hyperkalemia [E87.5]    Acute renal failure superimposed on chronic kidney disease, on chronic dialysis (HCC) [N17.9, N18.9, Z99.2]    Hypotension - chronic [I95.9]     PLAN:    Acute renal failure on chronic kidney disease stage III  Baseline creatinine of 1.5. Peak 4.1, current 3.3. Most likely caused by dehydration. Holding diuretics. Continue IV fluids. Nephrology consult requested. Repeat basic metabolic panel tomorrow. Too soon to discharge home.     Hyperkalemia  6.5 on arrival.  Still elevated, but not critical, today at 5.2. Received appropriate treatment in the emergency department. No EKG findings. Stop potassium supplementation. Done on arrival.  Monitor electrolytes  Keep on telemetry     Hyponatremia  127 today. Low but not critical.  Continue to monitor.     Diabetes mellitus type 2 with hypoglycemia  Scheduled preprandial insulin as well as Lantus are on hold. I will not restrict carb intake for now. Sliding scale insulin only. Discussed with the patient, questions answered    DVT Prophylaxis: Subcutaneous heparin  Diet: DIET RENAL;  Code Status: Full Code    PT/OT Eval Status: Requested    Dispo -inpatient stay, 1 or 2 more days, depending on patient's renal function.     Edward Carrasco MD

## 2020-07-01 ENCOUNTER — APPOINTMENT (OUTPATIENT)
Dept: GENERAL RADIOLOGY | Age: 55
DRG: 194 | End: 2020-07-01
Payer: COMMERCIAL

## 2020-07-01 LAB
ALBUMIN SERPL-MCNC: 3.7 G/DL (ref 3.4–5)
ANION GAP SERPL CALCULATED.3IONS-SCNC: 12 MMOL/L (ref 3–16)
ANION GAP SERPL CALCULATED.3IONS-SCNC: 13 MMOL/L (ref 3–16)
BASOPHILS ABSOLUTE: 0 K/UL (ref 0–0.2)
BASOPHILS RELATIVE PERCENT: 0.7 %
BUN BLDV-MCNC: 87 MG/DL (ref 7–20)
BUN BLDV-MCNC: 97 MG/DL (ref 7–20)
CALCIUM SERPL-MCNC: 9.6 MG/DL (ref 8.3–10.6)
CALCIUM SERPL-MCNC: 9.7 MG/DL (ref 8.3–10.6)
CHLORIDE BLD-SCNC: 90 MMOL/L (ref 99–110)
CHLORIDE BLD-SCNC: 91 MMOL/L (ref 99–110)
CO2: 27 MMOL/L (ref 21–32)
CO2: 29 MMOL/L (ref 21–32)
CREAT SERPL-MCNC: 2 MG/DL (ref 0.6–1.1)
CREAT SERPL-MCNC: 2.2 MG/DL (ref 0.6–1.1)
EOSINOPHILS ABSOLUTE: 0.2 K/UL (ref 0–0.6)
EOSINOPHILS RELATIVE PERCENT: 2.6 %
GFR AFRICAN AMERICAN: 28
GFR AFRICAN AMERICAN: 31
GFR NON-AFRICAN AMERICAN: 23
GFR NON-AFRICAN AMERICAN: 26
GLUCOSE BLD-MCNC: 122 MG/DL (ref 70–99)
GLUCOSE BLD-MCNC: 132 MG/DL (ref 70–99)
GLUCOSE BLD-MCNC: 155 MG/DL (ref 70–99)
GLUCOSE BLD-MCNC: 165 MG/DL (ref 70–99)
GLUCOSE BLD-MCNC: 171 MG/DL (ref 70–99)
GLUCOSE BLD-MCNC: 179 MG/DL (ref 70–99)
HCT VFR BLD CALC: 34.3 % (ref 36–48)
HEMOGLOBIN: 10.8 G/DL (ref 12–16)
LYMPHOCYTES ABSOLUTE: 0.8 K/UL (ref 1–5.1)
LYMPHOCYTES RELATIVE PERCENT: 12.9 %
MAGNESIUM: 2.8 MG/DL (ref 1.8–2.4)
MCH RBC QN AUTO: 24 PG (ref 26–34)
MCHC RBC AUTO-ENTMCNC: 31.4 G/DL (ref 31–36)
MCV RBC AUTO: 76.5 FL (ref 80–100)
MONOCYTES ABSOLUTE: 0.5 K/UL (ref 0–1.3)
MONOCYTES RELATIVE PERCENT: 8 %
NEUTROPHILS ABSOLUTE: 4.7 K/UL (ref 1.7–7.7)
NEUTROPHILS RELATIVE PERCENT: 75.8 %
PDW BLD-RTO: 19.6 % (ref 12.4–15.4)
PERFORMED ON: ABNORMAL
PHOSPHORUS: 4.3 MG/DL (ref 2.5–4.9)
PLATELET # BLD: 302 K/UL (ref 135–450)
PMV BLD AUTO: 8.8 FL (ref 5–10.5)
POTASSIUM REFLEX MAGNESIUM: 4.6 MMOL/L (ref 3.5–5.1)
POTASSIUM SERPL-SCNC: 4 MMOL/L (ref 3.5–5.1)
RBC # BLD: 4.49 M/UL (ref 4–5.2)
SODIUM BLD-SCNC: 130 MMOL/L (ref 136–145)
SODIUM BLD-SCNC: 132 MMOL/L (ref 136–145)
TROPONIN: 0.02 NG/ML
WBC # BLD: 6.2 K/UL (ref 4–11)

## 2020-07-01 PROCEDURE — 80069 RENAL FUNCTION PANEL: CPT

## 2020-07-01 PROCEDURE — 83735 ASSAY OF MAGNESIUM: CPT

## 2020-07-01 PROCEDURE — 6360000002 HC RX W HCPCS: Performed by: INTERNAL MEDICINE

## 2020-07-01 PROCEDURE — C8923 2D TTE W OR W/O FOL W/CON,CO: HCPCS

## 2020-07-01 PROCEDURE — 99255 IP/OBS CONSLTJ NEW/EST HI 80: CPT | Performed by: INTERNAL MEDICINE

## 2020-07-01 PROCEDURE — 6370000000 HC RX 637 (ALT 250 FOR IP): Performed by: INTERNAL MEDICINE

## 2020-07-01 PROCEDURE — 6370000000 HC RX 637 (ALT 250 FOR IP): Performed by: PEDIATRICS

## 2020-07-01 PROCEDURE — 84484 ASSAY OF TROPONIN QUANT: CPT

## 2020-07-01 PROCEDURE — 99291 CRITICAL CARE FIRST HOUR: CPT | Performed by: INTERNAL MEDICINE

## 2020-07-01 PROCEDURE — 85025 COMPLETE CBC W/AUTO DIFF WBC: CPT

## 2020-07-01 PROCEDURE — 2580000003 HC RX 258: Performed by: INTERNAL MEDICINE

## 2020-07-01 PROCEDURE — 2500000003 HC RX 250 WO HCPCS: Performed by: INTERNAL MEDICINE

## 2020-07-01 PROCEDURE — 94640 AIRWAY INHALATION TREATMENT: CPT

## 2020-07-01 PROCEDURE — 2060000000 HC ICU INTERMEDIATE R&B

## 2020-07-01 PROCEDURE — 73630 X-RAY EXAM OF FOOT: CPT

## 2020-07-01 PROCEDURE — 36415 COLL VENOUS BLD VENIPUNCTURE: CPT

## 2020-07-01 PROCEDURE — APPNB30 APP NON BILLABLE TIME 0-30 MINS: Performed by: NURSE PRACTITIONER

## 2020-07-01 RX ORDER — FUROSEMIDE 10 MG/ML
40 INJECTION INTRAMUSCULAR; INTRAVENOUS 2 TIMES DAILY
Status: DISCONTINUED | OUTPATIENT
Start: 2020-07-01 | End: 2020-07-02

## 2020-07-01 RX ORDER — FUROSEMIDE 10 MG/ML
40 INJECTION INTRAMUSCULAR; INTRAVENOUS 3 TIMES DAILY
Status: DISCONTINUED | OUTPATIENT
Start: 2020-07-01 | End: 2020-07-01

## 2020-07-01 RX ORDER — HYDROPHILIC CREAM
1 PASTE (GRAM) TOPICAL DAILY
Status: DISCONTINUED | OUTPATIENT
Start: 2020-07-01 | End: 2020-07-03 | Stop reason: HOSPADM

## 2020-07-01 RX ADMIN — HEPARIN SODIUM 5000 UNITS: 5000 INJECTION INTRAVENOUS; SUBCUTANEOUS at 07:07

## 2020-07-01 RX ADMIN — PANTOPRAZOLE SODIUM 40 MG: 40 TABLET, DELAYED RELEASE ORAL at 17:41

## 2020-07-01 RX ADMIN — PROMETHAZINE HYDROCHLORIDE 12.5 MG: 25 TABLET ORAL at 21:00

## 2020-07-01 RX ADMIN — INSULIN LISPRO 1 UNITS: 100 INJECTION, SOLUTION INTRAVENOUS; SUBCUTANEOUS at 17:40

## 2020-07-01 RX ADMIN — BUPRENORPHINE AND NALOXONE 1 FILM: 8; 2 FILM BUCCAL; SUBLINGUAL at 20:55

## 2020-07-01 RX ADMIN — HEPARIN SODIUM 5000 UNITS: 5000 INJECTION INTRAVENOUS; SUBCUTANEOUS at 22:19

## 2020-07-01 RX ADMIN — PANTOPRAZOLE SODIUM 40 MG: 40 TABLET, DELAYED RELEASE ORAL at 10:40

## 2020-07-01 RX ADMIN — MIDODRINE HYDROCHLORIDE 7.5 MG: 5 TABLET ORAL at 17:41

## 2020-07-01 RX ADMIN — MIDODRINE HYDROCHLORIDE 7.5 MG: 5 TABLET ORAL at 12:35

## 2020-07-01 RX ADMIN — ARIPIPRAZOLE 10 MG: 10 TABLET ORAL at 10:41

## 2020-07-01 RX ADMIN — Medication 10 ML: at 21:13

## 2020-07-01 RX ADMIN — FUROSEMIDE 40 MG: 10 INJECTION, SOLUTION INTRAMUSCULAR; INTRAVENOUS at 17:40

## 2020-07-01 RX ADMIN — ACETAMINOPHEN 650 MG: 325 TABLET ORAL at 10:41

## 2020-07-01 RX ADMIN — BUSPIRONE HYDROCHLORIDE 30 MG: 5 TABLET ORAL at 20:54

## 2020-07-01 RX ADMIN — LAMOTRIGINE 150 MG: 100 TABLET ORAL at 20:54

## 2020-07-01 RX ADMIN — BUSPIRONE HYDROCHLORIDE 30 MG: 5 TABLET ORAL at 10:41

## 2020-07-01 RX ADMIN — HEPARIN SODIUM 5000 UNITS: 5000 INJECTION INTRAVENOUS; SUBCUTANEOUS at 15:09

## 2020-07-01 RX ADMIN — INSULIN LISPRO 1 UNITS: 100 INJECTION, SOLUTION INTRAVENOUS; SUBCUTANEOUS at 12:35

## 2020-07-01 RX ADMIN — ATORVASTATIN CALCIUM 80 MG: 80 TABLET, FILM COATED ORAL at 20:54

## 2020-07-01 RX ADMIN — LAMOTRIGINE 150 MG: 100 TABLET ORAL at 10:41

## 2020-07-01 RX ADMIN — Medication 10 ML: at 10:42

## 2020-07-01 RX ADMIN — ASPIRIN 81 MG: 81 TABLET, COATED ORAL at 10:42

## 2020-07-01 RX ADMIN — MIDODRINE HYDROCHLORIDE 7.5 MG: 5 TABLET ORAL at 10:41

## 2020-07-01 RX ADMIN — BENZTROPINE MESYLATE 1 MG: 1 TABLET ORAL at 20:54

## 2020-07-01 RX ADMIN — CLOPIDOGREL BISULFATE 75 MG: 75 TABLET ORAL at 10:42

## 2020-07-01 RX ADMIN — BUPRENORPHINE AND NALOXONE 1 FILM: 8; 2 FILM BUCCAL; SUBLINGUAL at 10:42

## 2020-07-01 RX ADMIN — Medication 1 SQUIRT: at 20:55

## 2020-07-01 RX ADMIN — TIOTROPIUM BROMIDE INHALATION SPRAY 2 PUFF: 3.12 SPRAY, METERED RESPIRATORY (INHALATION) at 07:55

## 2020-07-01 ASSESSMENT — PAIN DESCRIPTION - LOCATION
LOCATION: LEG
LOCATION: LEG

## 2020-07-01 ASSESSMENT — PAIN SCALES - GENERAL
PAINLEVEL_OUTOF10: 0
PAINLEVEL_OUTOF10: 0
PAINLEVEL_OUTOF10: 5
PAINLEVEL_OUTOF10: 5

## 2020-07-01 ASSESSMENT — PAIN DESCRIPTION - DESCRIPTORS: DESCRIPTORS: HEADACHE

## 2020-07-01 ASSESSMENT — PAIN DESCRIPTION - ORIENTATION
ORIENTATION: RIGHT
ORIENTATION: RIGHT

## 2020-07-01 NOTE — PROGRESS NOTES
Hospitalist Progress Note      PCP: Emilee Lopez PA-C    Date of Admission: 6/29/2020    Chief Complaint: Weakness    Hospital Course: H & P reviewed. Admitted with SHAUNNA on CKD. Subjective:     Pt denied any complaints. SOB improved. Medications:  Reviewed    Infusion Medications    dextrose       Scheduled Medications    aspirin EC  81 mg Oral Daily    atorvastatin  80 mg Oral Nightly    benztropine  1 mg Oral Nightly    ARIPiprazole  10 mg Oral Daily    buprenorphine-naloxone  1 Film Sublingual BID    clopidogrel  75 mg Oral Daily    busPIRone  30 mg Oral BID    lamoTRIgine  150 mg Oral BID    pantoprazole  40 mg Oral BID    tiotropium  2 puff Inhalation Daily    sodium chloride flush  10 mL Intravenous 2 times per day    heparin (porcine)  5,000 Units Subcutaneous 3 times per day    insulin lispro  0-6 Units Subcutaneous TID WC    insulin lispro  0-3 Units Subcutaneous Nightly    midodrine  7.5 mg Oral TID      PRN Meds: perflutren lipid microspheres, sodium chloride flush, acetaminophen **OR** acetaminophen, polyethylene glycol, promethazine **OR** ondansetron, glucose, dextrose, glucagon (rDNA), dextrose      Intake/Output Summary (Last 24 hours) at 7/1/2020 1101  Last data filed at 7/1/2020 0545  Gross per 24 hour   Intake 300 ml   Output 4600 ml   Net -4300 ml       Physical Exam Performed:    BP (!) 93/57   Pulse 86   Temp 97.4 °F (36.3 °C) (Oral)   Resp 18   Ht 5' 4\" (1.626 m)   Wt 247 lb 14.4 oz (112.4 kg)   LMP 10/24/2012   SpO2 96%   BMI 42.55 kg/m²     General appearance: No apparent distress, appears stated age and cooperative. HEENT: Pupils equal, round, Conjunctivae/corneas clear. Neck: Supple, with full range of motion. No jugular venous distention. Trachea midline. Respiratory:  Normal respiratory effort. Clear to auscultation, bilaterally without Rales/Wheezes/Rhonchi.   Cardiovascular: Regular rate and rhythm with normal S1/S2 without murmurs, rubs or gallops. Abdomen: Soft, non-tender, non-distended with normal bowel sounds. Musculoskeletal: No clubbing, cyanosis + bilat pedal edema     Skin:  Chronic leg wounds on bilat shins with no overt discharge   Neurologic:  Alert speech clear with no overt facial droop  Psychiatric: Alert and oriented, thought content appropriate, normal insight      Labs:   Recent Labs     06/29/20  1707 07/01/20  0508   WBC 8.0 6.2   HGB 11.7* 10.8*   HCT 37.0 34.3*    302     Recent Labs     06/29/20  2359 06/30/20  0443 07/01/20  0508   * 127* 132*   K 5.3* 5.2* 4.0   CL 88* 89* 91*   CO2 25 22 29   * 129* 97*   CREATININE 3.6* 3.3* 2.2*   CALCIUM 9.8 9.5 9.6   PHOS  --   --  4.3     Recent Labs     06/29/20  1707   AST 19   ALT 15   BILITOT 0.7   ALKPHOS 207*     No results for input(s): INR in the last 72 hours. Recent Labs     06/29/20  1707 07/01/20  0831   TROPONINI 0.06* 0.02*       Urinalysis:      Lab Results   Component Value Date    NITRU Negative 06/29/2020    WBCUA 3-5 09/28/2019    BACTERIA Rare 09/28/2019    RBCUA None seen 09/28/2019    BLOODU Negative 06/29/2020    SPECGRAV 1.015 06/29/2020    GLUCOSEU Negative 06/29/2020       Radiology:  XR CHEST PORTABLE   Final Result   Negative portable study. Assessment/Plan:    Active Hospital Problems    Diagnosis    Acute on chronic renal insufficiency [N28.9, N18.9]    Acute on chronic renal failure (HCC) [N17.9, N18.9]    Hyperkalemia [E87.5]    Acute renal failure superimposed on chronic kidney disease, on chronic dialysis ([facility] [N17.9, N18.9, Z99.2]    Hypotension - chronic [I95.9]    Acute on chronic systolic congestive heart failure (HCC) [I50.23]         Acute renal failure on chronic kidney disease stage III: likely due to  Cardiorenal syndrome. Nephro assisting. Received IV lasix X1. Continue diuresis as needed per nephro.  Monitor and avoid nephrotoxins.      Hyperkalemia:  Received appropriate treatment in the emergency department. No EKG findings. Stopped potassium supplementation. Monitor electrolytes  Keep on telemetry     Hyponatremia: likely hypovolemic due to CHF. Mgt per nephro. Monitor       Acute on chronic systolic CHF : Ef 45%. Cardio recs appreciated. Diuresis held today. Repeat echo pending. cardio with plans for possible cath at some point when renal function stable.          Diabetes mellitus type 2 with hypoglycemia: Scheduled preprandial insulin as well as Lantus are on hold. No repeat hypoglycemia noted overnight. Monitor and continue SSI and hypoglycemia protocol     Chronic leg wounds: follows at wound care clinic  with no overt signs of infection currently. Wound care consulted         DVT Prophylaxis: Subcutaneous heparin  Diet: DIET CARDIAC; Low Sodium (2 GM);  Daily Fluid Restriction: 1500 ml  Code Status: Full Code    PT/OT Eval Status: Requested    Dispo - hard to say, pending clinical course     Jg Chinchilla MD

## 2020-07-01 NOTE — CONSULTS
[address] [address] Continence Nurse  Consult Note       NAME:  Rhona Bautista  MEDICAL RECORD NUMBER:  [id number]  AGE: 62 y.o. GENDER: female  : 1965  TODAY'S DATE:  2020    Subjective: I was just at wound care on  and I have another appointment on Monday. Dr. Don Godwin office was suppose to call me about making an appointment. Reason for WOCN Evaluation and Assessment: chronic leg wounds      Melissa Moreno is a 62 y.o. female referred by:   [x] Physician  [] Nursing  [] Other:     Wound Identification:  Wound Type: venous  Contributing Factors: edema, venous stasis, diabetes, chronic pressure, decreased mobility, shear force and obesity    Wound History: 62 y.o. female with multiple medical problems as below in past medical history presents to [facility] after being instructed to come in by her cardiology nurse practitioner after lab draw demonstrated multiple abnormalities including acute kidney injury on top of her chronic kidney injury, hyperkalemia and hypomagnesemia. The patient herself states that she is at her baseline. She has some shortness of breath which is unchanged. She has chronic lower extremity edema including nonhealing ulcers on bilateral legs worse on right than left. She has not had any chest pain, palpitations, increased weakness, syncope or near syncope. She states she has been compliant with all medications. No change in urine output. No hematuria. Per emergency department midlevel discussions were had with on-call nephrologist Dr. Verito Salmon. Recommendations were for IV fluids and acute temporizing measures. Patient does typically follow with Dr. Penelope Espinoza of nephrology.   Current Wound Care Treatment: Bilateral Lower Extremities - Triad ointment    Patient Goal of Care:  [x] Wound Healing  [] Odor Control  [] Palliative Care  [x] Pain Control   [] Other:         PAST MEDICAL HISTORY        Diagnosis Date    [facility] 2019  Acute on chronic congestive heart failure (HCC)     Alcohol dependence ([facility] 3/10/2010    Cellulitis 6/3/2020    Diabetic ulcer of left foot associated with type 2 diabetes mellitus ([facility] 1/18/2020    Diabetic ulcer of toe of right foot associated with type 2 diabetes mellitus ([facility] 1/18/2020    [facility]     Positive FIT (fecal immunochemical test) 2/28/2020       PAST SURGICAL HISTORY    Past Surgical History:   Procedure Laterality Date    ARTERIAL BYPASS SURGRY Left 01/06/2016    Axillary/Subclavian to Brachial Bypass w/reversed SVG    CARDIAC CATHETERIZATION  03/27/2018    Dr. Yoel Wilkerson - at [address]  01/20/2020    Dr. Kory Stovall      pancreatitis post surgery    CORONARY ANGIOPLASTY WITH STENT PLACEMENT  03/16/2018    MELODY- 3.5 x 38 and 3.5 x 20 to Cx    CORONARY ANGIOPLASTY WITH STENT PLACEMENT  01/03/2018    MELODY- 3.0 x 38 and 2.75 x 26 and 2.75 x 8 and 2.75 x 22 to the LAD    CORONARY ARTERY BYPASS GRAFT N/A 1/27/2020    CORONARY ARTERY BYPASS GRAFTING X 1, ON PUMP BEATING HEART, INTERNAL MAMMARY ARTERY TO LEFT ANTERIOR DESCENDING ARTERY, VAS CATH INSERTION, URI, PLATELET GEL APPLICATION, 5 LEVEL BILATERAL INTERCOSTAL NERVE BLOCK, STERNAL PLATING performed by Pavan Mason MD at [address] Right 5/16/2019    fem-pop, performed by Betty Ruffin MD at [address]  02/14/2019    CardioMEMs insertion for CHF    ROTATOR CUFF REPAIR Left 04/22/2016    TONSILLECTOMY      TRANSLUMINAL ANGIOPLASTY Left 10/08/2019    with stenting, at [facility]    [facility] Left 04/29/2020    of the [facility] re-occlusion    TUMOR EXCISION      benign tumors X 2 chest & axilla    UPPER GASTROINTESTINAL ENDOSCOPY  4/25/2013    BX barretts, HH, gastritis    UPPER GASTROINTESTINAL ENDOSCOPY  12/10/2015    UPPER GASTROINTESTINAL ENDOSCOPY  01/06/2017    Gastritis    VASCULAR SURGERY Left 2015    upper extremity and mesenteric angiogram (diagnostic only)       FAMILY HISTORY    Family History   Problem Relation Age of Onset    Heart Disease Maternal Grandmother     Cancer Mother         lung and brain cancer    Cancer Maternal Aunt         lung and brain cancer       SOCIAL HISTORY    Social History     Tobacco Use    Smoking status: Current Every Day Smoker     Packs/day: 1.00     Years: 30.00     Pack years: 30.00     Types: Cigarettes     Last attempt to quit: 2019     Years since quittin.9    Smokeless tobacco: Never Used    Tobacco comment: trying to quit, one cigarette daily   Substance Use Topics    Alcohol use: No     Comment: quit      Drug use: Never       ALLERGIES    Allergies   Allergen Reactions    Lisinopril Other (See Comments)     Severe hypotension       MEDICATIONS    No current facility-administered medications on file prior to encounter. Current Outpatient Medications on File Prior to Encounter   Medication Sig Dispense Refill    midodrine (PROAMATINE) 5 MG tablet Take 1.5 tablets by mouth 3 times daily 90 tablet 3    torsemide (DEMADEX) 100 MG tablet Take 1 tablet by mouth daily 100 mg daily except 50 mg twice weekly (Monday and Thursday).  Insulin Degludec (TRESIBA FLEXTOUCH) 100 UNIT/ML SOPN Inject 30 Units into the skin nightly 10 pen 5    insulin lispro, 1 Unit Dial, 100 UNIT/ML SOPN Inject 10 Units into the skin 3 times daily 3 pen 2    potassium chloride (KLOR-CON M) 20 MEQ extended release tablet Take 2 tablets by mouth 4 times daily 240 tablet 3    spironolactone (ALDACTONE) 25 MG tablet Take 1 tablet by mouth daily 30 tablet 3    atorvastatin (LIPITOR) 80 MG tablet Take 1 tablet by mouth nightly 90 tablet 3    tiotropium (SPIRIVA RESPIMAT) 2.5 MCG/ACT AERS inhaler INHALE 2 PUFFS INTO THE LUNGS DAILY 1 Inhaler 6    buprenorphine-naloxone (SUBOXONE) 8-2 MG FILM SL film Place 1 Film under the tongue 2 times daily. Risk Score: Vivek Scale Score: 22    Patient Active Problem List   Diagnosis Code    Type 2 diabetes mellitus with diabetic polyneuropathy, with long-term current use of insulin ([facility] [place] [facility]) E11.42, Z79.4    Essential hypertension I10    CLINT (obstructive sleep apnea) G47.33    Tobacco abuse disorder Z72.0    GERD (gastroesophageal reflux disease) K21.9    Anxiety and depression F41.9, F32.9    Hyponatremia E87.1    Iron deficiency anemia D50.9    CAD (coronary artery disease) I25.10    Mitral valve regurgitation I34.0    Stage 3 chronic kidney disease (HCC) N18.3    Claudication in peripheral vascular disease ([facility] [place] [facility]) I73.9    COPD (chronic obstructive pulmonary disease) ([facility] [place] [facility]) J44.9    Vitamin D deficiency E55.9    Acute on chronic systolic congestive heart failure ([facility] [place] [facility]) I50.23    Dyslipidemia E78.5    Abel's esophagus K22.70    Viral hepatitis C B19.20    Pulmonary nodule R91.1    Class 2 severe obesity due to excess calories with serious comorbidity and body mass index (BMI) of 39.0 to 39.9 in adult ([facility] [place] [facility]) E66.01, Z68.39    Bipolar disorder ([facility] [place] [facility]) F31.9    Hypotension - chronic I95.9    Pulmonary hypertension ([facility] [place] [facility]) I27.20    Bilateral lower extremity edema R60.0    Stasis dermatitis of both legs I87.2    Habitual self-excoriation F42.4    Ulcer of left lower leg, limited to breakdown of skin ([facility] [place] [facility]) L97.921    Ulcer of right leg, with fat layer exposed ([address] L97.912    Arthritis M19.90    Cardiomyopathy ([facility] [place] [facility]) I42.9    Chronic systolic heart failure ([facility] [place] [facility]) I50.22    Acute on chronic renal failure (HCC) N17.9, N18.9    Hyperkalemia E87.5    Acute renal failure superimposed on chronic kidney disease, on chronic dialysis (HCC) N17.9, N18.9, Z99.2    Acute on chronic renal insufficiency N28.9, N18.9       Measurements:  Wound 01/17/20 Toe (Comment  which one) Anterior; Left Closed brown plantar 3rd toe (Active)   Number of days: 165       Wound 01/20/20 Toe (Comment  which one) Anterior; Left 1st 7/1/2020  2:15 PM   Red%Wound Bed 40 7/1/2020  2:15 PM   Yellow%Wound Bed 60 7/1/2020  2:15 PM   Culture Taken No 7/1/2020  2:15 PM   Number of days: 12    L Lower Leg:                 Wound 06/18/20 #2, right lower leg cluster, venous, full thickness, onset 6/1/2020 (Active)   Wound Image    7/1/2020  2:15 PM   Wound Venous 7/1/2020  2:15 PM   Dressing Status Intact; New drainage; Changed 7/1/2020  2:15 PM   Dressing Changed Changed/New 7/1/2020  2:15 PM   Dressing/Treatment Pharmaceutical agent (see MAR);4x4;Roll gauze; Ace wrap 7/1/2020  2:15 PM   Wound Cleansed Rinsed/Irrigated with saline 7/1/2020  2:15 PM   Dressing Change Due 07/01/20 7/1/2020  2:15 PM   Wound Length (cm) 14.5 cm 7/1/2020  2:15 PM   Wound Width (cm) 20 cm 7/1/2020  2:15 PM   Wound Depth (cm) 0.1 cm 7/1/2020  2:15 PM   Wound Surface Area (cm^2) 290 cm^2 7/1/2020  2:15 PM   Change in Wound Size % (l*w) 46.2 7/1/2020  2:15 PM   Wound Volume (cm^3) 29 cm^3 7/1/2020  2:15 PM   Wound Healing % 46 7/1/2020  2:15 PM   Post-Procedure Length (cm) 14.5 cm 6/25/2020  1:14 PM   Post-Procedure Width (cm) 20 cm 6/25/2020  1:14 PM   Post-Procedure Depth (cm) 0.2 cm 6/25/2020  1:14 PM   Post-Procedure Surface Area (cm^2) 290 cm^2 6/25/2020  1:14 PM   Post-Procedure Volume (cm^3) 58 cm^3 6/25/2020  1:14 PM   Distance Tunneling (cm) 0 cm 6/25/2020 12:44 PM   Tunneling Position ___ O'Clock 0 6/25/2020 12:44 PM   Undermining Starts ___ O'Clock 0 6/25/2020 12:44 PM   Undermining Ends___ O'Clock 0 6/25/2020 12:44 PM   Undermining Maxium Distance (cm) 0 6/25/2020 12:44 PM   Wound Assessment Red;Yellow;Painful 7/1/2020  2:15 PM   Drainage Amount Small 7/1/2020  2:15 PM   Drainage Description Serosanguinous 7/1/2020  2:15 PM   Odor None 7/1/2020  2:15 PM   Margins Attached edges 7/1/2020  2:15 PM   Shaye-wound Assessment Edema; Red 7/1/2020  2:15 PM   Red%Wound Bed 40 7/1/2020  2:15 PM   Yellow%Wound Bed 60 7/1/2020  2:15 PM   Culture Taken No 7/1/2020  2:15 PM   Number of days: 12   R Lower Leg:            Incision 01/27/20 Sternum (Active)   Number of days: 156       Incision 03/10/20 Sternum (Active)   Number of days: 113         Response to treatment:  Well tolerated by patient. Pain Assessment:  Severity:  0 / 10  Quality of pain: N/A  Wound Pain Timing/Severity: none  Premedicated: No    Plan   Plan of Care: Wound 06/18/20 #1, left lower leg cluster, venous, partial thickness, onset 6/1/2020-Dressing/Treatment: Pharmaceutical agent (see MAR), 4x4, Roll gauze, Ace wrap(Triad)  Wound 06/18/20 #2, right lower leg cluster, venous, full thickness, onset 6/1/2020-Dressing/Treatment: Pharmaceutical agent (see MAR), 4x4, Roll gauze, Ace wrap(Triad)     Recommendation: R and L Lower Legs - clean ulcerations with NSS; pat dry; apply Triad ointment daily; cover with 4x4; (2) roll gauze and (2) ace wraps from toes to knee; elevate legs  Call Wound Care for deterioration [phone]; pager [phone]    Specialty Bed Required : No   [] Low Air Loss   [] Pressure Redistribution  [] Fluid Immersion  [] Bariatric  [] Total Pressure Relief  [] Other:     Current Diet: DIET CARDIAC; Low Sodium (2 GM);  Daily Fluid Restriction: 1500 ml  Dietician consult:  No    Discharge Plan:  Placement for patient upon discharge: home with support    Patient appropriate for Outpatient [address]: Yes current patient of [address] [place] [address]    Referrals:  [x]  following  [] [facility]  [] Supplies  [] Other    Patient/Caregiver Teaching:  Level of patient/caregiver understanding able to:   [x] Indicates understanding       [] Needs reinforcement  [] Unsuccessful      [] Verbal Understanding  [] Demonstrated understanding       [] No evidence of learning  [] Refused teaching         [] N/A       Electronically signed by Priscilla Wright RN, Katelynn Carlos on 7/1/2020 at 2:20 PM

## 2020-07-01 NOTE — FLOWSHEET NOTE
07/01/20 0843   Assessment   Charting Type Shift assessment   Neurological   Neuro (WDL) WDL   Level of Consciousness 0   [place] Coma Scale   Eye Opening 4   Best Verbal Response 5   Best Motor Response 6   Silvia Coma Scale Score 15   HEENT   HEENT (WDL) X   Right Eye Intact; Impaired vision   Left Eye Intact; Impaired vision   Teeth Missing teeth   Respiratory   Respiratory (WDL) WDL   Respiratory Pattern Regular   Respiratory Depth Normal   Respiratory Quality/Effort Unlabored   Chest Assessment Chest expansion symmetrical;Trachea midline   L Breath Sounds Clear   R Breath Sounds Clear   Cardiac   Cardiac (WDL) WDL   Cardiac Regularity Regular   Heart Sounds S1, S2   Cardiac Rhythm NSR   Rhythm Interpretation   Pulse 86   Cardiac Monitor   Telemetry Monitor On Yes   Telemetry Audible Yes   Telemetry Alarms Set Yes   Gastrointestinal   Abdominal (WDL) WDL   Peripheral Vascular   Peripheral Vascular (WDL) X   RLE Edema +2;Pitting   LLE Edema +2;Pitting   Skin Color/Condition   Skin Color/Condition (WDL) X   Skin Color Red  (BLE)   Skin Condition/Temp Swollen;Warm;Dry   Skin Integrity   Skin Integrity (WDL) X   Skin Integrity Abrasion;Bruising   Location Scattered   Preventative Dressing No  (Independent)   Musculoskeletal   Musculoskeletal (WDL) WDL   Genitourinary   Genitourinary (WDL) WDL   Anus/Rectum   Anus/Rectum (WDL) WDL   Psychosocial   Psychosocial (WDL) WDL

## 2020-07-01 NOTE — PLAN OF CARE
cardiomems readings:                  Time: 20 minutes to obtain reading, issues with electrical interference initially.    Camryn Soto CNP, 7/1/2020, 1:47 PM

## 2020-07-01 NOTE — CONSULTS
[address] [place] [address]  [phone]      Attending Physician: Marika Pena MD  Reason for Consultation/Chief Complaint: abnormal labs    Subjective   History of Present Illness:  Anthony Humphreys is a 62 y.o. patient who presented to the hospital with complaints of abnormal labs. Pt being followed as outpt with [facility]. As outpt, Sophia HURT was adjusting torsemide and was slightly decreased. Aldactone and metalozone. Bp was low in 70's. Back to torsemide 100mg qday (150 Mon/thur). States prior to coming in, felt ok except Bp was 64/46 (last Friday), recently SBP in 90's. States + Chest pressure with walking over last few days. Better overnight. CP worse with excertion. Not very active. SOb much better this AM. + Edema with weeping ulcers. Sits in chair to sleep (chornic)         Past Medical History:   has a past medical history of Acute kidney injury ([address] Acute on chronic congestive heart failure ([address] Alcohol dependence ([address] Cellulitis, Diabetic ulcer of left foot associated with type 2 diabetes mellitus ([address] Diabetic ulcer of toe of right foot associated with type 2 diabetes mellitus ([address] MI (myocardial infarction) ([address] and Positive FIT (fecal immunochemical test). Surgical History:   has a past surgical history that includes Tonsillectomy; Cholecystectomy; tumor excision; Upper gastrointestinal endoscopy (4/25/2013); Upper gastrointestinal endoscopy (12/10/2015); Upper gastrointestinal endoscopy (01/06/2017); Arterial bypass surgry (Left, 01/06/2016); Cardiac catheterization (03/27/2018); Coronary angioplasty with stent (03/16/2018); Rotator cuff repair (Left, 04/22/2016); Coronary angioplasty with stent (01/03/2018); Insertable Cardiac Monitor (02/14/2019); femoral bypass (Right, 5/16/2019); transluminal angioplasty (Left, 10/08/2019); Cardiac catheterization (01/20/2020);  Coronary artery bypass graft (N/A, 1/27/2020); vascular surgery (Left, 12/08/2015); and transluminal angioplasty (Left, 04/29/2020). Social History:   reports that she has been smoking cigarettes. She has a 30.00 pack-year smoking history. She has never used smokeless tobacco. She reports that she does not drink alcohol or use drugs. t- <10cig/day (~1 a day)      Family History:  family history includes Cancer in her maternal aunt and mother; Heart Disease in her maternal grandmother. Home Medications:  Were reviewed and are listed in nursing record and/or below  Prior to Admission medications    Medication Sig Start Date End Date Taking? Authorizing Provider   midodrine (PROAMATINE) 5 MG tablet Take 1.5 tablets by mouth 3 times daily 6/26/20  Yes TRAY Long CNP   torsemide [facility]) 100 MG tablet Take 1 tablet by mouth daily 100 mg daily except 50 mg twice weekly (Monday and Thursday). 6/7/20  Yes Jeffrey Santoyo MD   Insulin Degludec (TRESIBA FLEXTOUCH) 100 UNIT/ML SOPN Inject 30 Units into the skin nightly 5/27/20  Yes TRAY Davis CNP   insulin lispro, 1 Unit Dial, 100 UNIT/ML SOPN Inject 10 Units into the skin 3 times daily 5/20/20  Yes TRAY Davis CNP   potassium chloride (KLOR-CON M) 20 MEQ extended release tablet Take 2 tablets by mouth 4 times daily 4/15/20  Yes TRAY Gonzalez CNP   spironolactone (ALDACTONE) 25 MG tablet Take 1 tablet by mouth daily 4/3/20  Yes Viridiana Dewey MD   atorvastatin (LIPITOR) 80 MG tablet Take 1 tablet by mouth nightly 2/28/20  Yes TRAY Gonzalez CNP   tiotropium (SPIRIVA RESPIMAT) 2.5 MCG/ACT AERS inhaler INHALE 2 PUFFS INTO THE LUNGS DAILY 2/25/20  Yes Afsaneh Jordan MD   buprenorphine-naloxone (SUBOXONE) 8-2 MG FILM SL film Place 1 Film under the tongue 2 times daily.    Yes Historical Provider, MD   albuterol sulfate  (90 Base) MCG/ACT inhaler Inhale 2 puffs into the lungs every 6 hours as needed for Wheezing or Shortness of Breath   Yes Historical Provider, MD   benztropine (COGENTIN) 1 MG tablet Take 1 mg by mouth nightly  12/13/19  Yes Historical Provider, MD   blood glucose test strips (EXACTECH TEST) strip 6 times daily.  12/19/19  Yes TRAY Delgado CNP   Insulin Pen Needle (B-D ULTRAFINE III SHORT PEN) 31G X 8 MM MISC Five shots a day 12/3/19  Yes TRAY Delgado CNP   INSULIN SYRINGE .5CC/29G 29G X 1/2\" 0.5 ML MISC 1 each by Does not apply route daily 12/3/19  Yes TRAY Delgado CNP   Liraglutide (VICTOZA) 18 MG/3ML SOPN SC injection Inject 1.2 mg into the skin daily 10/29/19  Yes TRAY Delgado CNP   pantoprazole (PROTONIX) 40 MG tablet Take 1 tablet by mouth 2 times daily 10/1/19  Yes Jeffrey Simmonds, MD   clopidogrel (PLAVIX) 75 MG tablet TAKE 1 TABLET BY MOUTH EVERY DAY 9/3/19  Yes TRAY Flores CNP   magnesium oxide (MAG-OX) 400 (240 Mg) MG tablet TAKE 1 TABLET ONCE DAILY 5/1/19  Yes Nickie Hester MD   busPIRone (BUSPAR) 30 MG tablet Take 30 mg by mouth 2 times daily  4/2/18  Yes Historical Provider, MD   aspirin EC 81 MG EC tablet Take 1 tablet by mouth daily 1/8/16  Yes Marisela Camarillo MD   ARIPiprazole (ABILIFY) 10 MG tablet Take 10 mg by mouth daily    Yes Historical Provider, MD   lamoTRIgine (LAMICTAL) 150 MG tablet Take 150 mg by mouth 2 times daily    Yes Historical Provider, MD        CURRENT Medications:  aspirin EC tablet 81 mg, Daily  atorvastatin (LIPITOR) tablet 80 mg, Nightly  benztropine (COGENTIN) tablet 1 mg, Nightly  ARIPiprazole (ABILIFY) tablet 10 mg, Daily  buprenorphine-naloxone (SUBOXONE) 8-2 MG SL film 1 Film, BID  clopidogrel (PLAVIX) tablet 75 mg, Daily  busPIRone (BUSPAR) tablet 30 mg, BID  lamoTRIgine (LAMICTAL) tablet 150 mg, BID  pantoprazole (PROTONIX) tablet 40 mg, BID  tiotropium (SPIRIVA RESPIMAT) 2.5 MCG/ACT inhaler 2 puff, Daily  sodium chloride flush 0.9 % injection 10 mL, 2 times per day  sodium chloride flush 0.9 % injection 10 mL, PRN  acetaminophen (TYLENOL) tablet 650 mg, Q6H PRN    Or  acetaminophen (TYLENOL) suppository 650 mg, Q6H PRN  polyethylene glycol (GLYCOLAX) packet 17 g, Daily PRN  promethazine (PHENERGAN) tablet 12.5 mg, Q6H PRN    Or  ondansetron (ZOFRAN) injection 4 mg, Q6H PRN  heparin (porcine) injection 5,000 Units, 3 times per day  glucose (GLUTOSE) 40 % oral gel 15 g, PRN  dextrose 50 % IV solution, PRN  glucagon (rDNA) injection 1 mg, PRN  dextrose 5 % solution, PRN  insulin lispro (HUMALOG) injection vial 0-6 Units, TID WC  insulin lispro (HUMALOG) injection vial 0-3 Units, Nightly  midodrine (PROAMATINE) tablet 7.5 mg, TID WC        Allergies:  Lisinopril     Review of Systems:   A 14 point review of symptoms completed. Pertinent positives identified in the HPI, all other review of symptoms negative as below. Objective   PHYSICAL EXAM:    Vitals:    06/30/20 2332   BP: 91/64   Pulse: 80   Resp: 14   Temp: 98 °F (36.7 °C)   SpO2: 92%    Weight: 247 lb 14.4 oz (112.4 kg)         General Appearance:  Alert, cooperative, no distress, appears stated age   Head:  Normocephalic, without obvious abnormality, atraumatic   Eyes:  PERRL, conjunctiva/corneas clear   Nose: Nares normal, no drainage or sinus tenderness   Throat: Lips, mucosa, and tongue normal   Neck: Supple, symmetrical, trachea midline, no adenopathy, thyroid: not enlarged, symmetric, no tenderness/mass/nodules, no carotid bruit or JVD   Lungs:   Clear to auscultation bilaterally, respirations unlabored   Chest Wall:  No deformity or tenderness   Heart:  Regular rate and rhythm, S1, S2 normal, no murmur, rub or gallop   Abdomen:   Soft, non-tender, bowel sounds active all four quadrants,  no masses, no organomegaly   Extremities: Extremities normal, atraumatic, no cyanosis or trace pitting, 1+ nonpitting edema.  + weeping ulcers on Rt leg, + errythemia   Pulses: 2+ and symmetric   Skin: Skin color, texture, turgor normal, no rashes or lesions   Pysch: Normal mood and affect   Neurologic: Normal gross motor and sensory exam.         Labs CBC:   Lab Results   Component Value Date    WBC 6.2 2020    RBC 4.49 2020    HGB 10.8 2020    HCT 34.3 2020    MCV 76.5 2020    RDW 19.6 2020     2020     CMP:  Lab Results   Component Value Date     2020    K 4.0 2020    K 5.2 2020    CL 91 2020    CO2 29 2020    BUN 97 2020    CREATININE 2.2 2020    GFRAA 28 2020    AGRATIO 1.1 2020    LABGLOM 23 2020    GLUCOSE 132 2020    PROT 7.6 2020    CALCIUM 9.6 2020    BILITOT 0.7 2020    ALKPHOS 207 2020    AST 19 2020    ALT 15 2020     PT/INR:  No results found for: PTINR  HgBA1c:  Lab Results   Component Value Date    LABA1C 7.2 2020     Lab Results   Component Value Date    TROPONINI 0.06 (H) 2020         Cardiac Data     Last EK2020 Sinus sera at 54, RBBB, LAD    Echo: 2020   Technically difficult examination secondary to habitus. The left ventricular systolic function is severely reduced with an ejection fraction of 25 %. There is severe hypokinesis of the anterior and apical walls consistent with   infarction within the territory of the left anterior descending artery. Grade II diastolic dysfunction with elevated left ventricular filling  pressure. Moderate to severe mitral regurgitation. Mild tricuspid regurgitation. is estimated at 53 mmHg consistent with moderate pulmonary hypertension   (Right atrial pressure of 3 mmHg). If clinically necessary, consider URI for better evaluation of mitral   regurgitation.     Stress Test:    Cath: 2020 Left Heart Cath  Dominance : Right     LM: 70-80% short distal stenosis     LAD: 70-80% short ostial stenosis, extending into takeoff of high first diagonal; large proximal to mid stented segment patent with jailed second diagonal  (80% ostial D2) and 70% in stent restenosis of most distal stent; distal to near apical LAD with ([id number]) Total critical care time was 48 minutes, excluding separately reportable procedures. Services, included in critical care time were chart data review, documentation time, obtaining info from patient, review of nursing notes, and vital sign assessment and management of the patient. There was a high probability of clinically significant life-threatening deterioration in the patient's condition, which required my urgent intervention. Thank you for allowing us to participate in the care of [address] [place] [address]. Please call me with any questions [phone]. Nicanor Welch MD, [address] [facility] Cardiologist  [facility] [place], operated by [facility]  [phone] [facility]  [phone] [address] [place] [address]  7/1/2020 7:44 AM    I will address the patient's cardiac risk factors and adjusted pharmacologic treatment as needed. In addition, I have reinforced the need for patient directed risk factor modification. All questions and concerns were addressed to the patient/family. Alternatives to my treatment were discussed. The note was completed using EMR. Every effort was made to ensure accuracy; however, inadvertent computerized transcription errors may be present.

## 2020-07-01 NOTE — PROGRESS NOTES
Nephrology Progress Note   [email or web address]      This patient is a 62year old female whom we are following for SHAUNNA. Subjective: The patient was seen and examined. Received a dose of IV Lasix yesterday, UO=5.9L the past 24H. Family History: No family at bedside  ROS: No nausea or vomiting      Vitals:  BP 91/64   Pulse 80   Temp 98 °F (36.7 °C)   Resp 14   Ht 5' 4\" (1.626 m)   Wt 247 lb 14.4 oz (112.4 kg)   LMP 10/24/2012   SpO2 92%   BMI 42.55 kg/m²   I/O last 3 completed shifts: In: 540 [P.O.:540]  Out: 5900 [Urine:5900]  No intake/output data recorded. Physical Exam:  Physical Exam  Vitals signs reviewed. Constitutional:       General: She is not in acute distress. Appearance: Normal appearance. HENT:      Head: Normocephalic and atraumatic. Mouth/Throat:      Mouth: Mucous membranes are moist.   Eyes:      General: No scleral icterus. Conjunctiva/sclera: Conjunctivae normal.   Cardiovascular:      Rate and Rhythm: Normal rate. Heart sounds: No friction rub. Pulmonary:      Effort: Pulmonary effort is normal. No respiratory distress. Breath sounds: No wheezing. Abdominal:      General: Bowel sounds are normal. There is no distension. Tenderness: There is no abdominal tenderness. Musculoskeletal:      Right lower leg: Edema present. Left lower leg: Edema present. Neurological:      Mental Status: She is alert.            Medications:   aspirin EC  81 mg Oral Daily    atorvastatin  80 mg Oral Nightly    benztropine  1 mg Oral Nightly    ARIPiprazole  10 mg Oral Daily    buprenorphine-naloxone  1 Film Sublingual BID    clopidogrel  75 mg Oral Daily    busPIRone  30 mg Oral BID    lamoTRIgine  150 mg Oral BID    pantoprazole  40 mg Oral BID    tiotropium  2 puff Inhalation Daily    sodium chloride flush  10 mL Intravenous 2 times per day    heparin (porcine)  5,000 Units Subcutaneous 3 times per day    insulin lispro  0-6 Units

## 2020-07-02 PROBLEM — I87.2 PERIPHERAL VENOUS INSUFFICIENCY: Status: ACTIVE | Noted: 2020-07-02

## 2020-07-02 PROBLEM — E11.42 DIABETIC POLYNEUROPATHY ASSOCIATED WITH TYPE 2 DIABETES MELLITUS (HCC): Status: ACTIVE | Noted: 2020-07-02

## 2020-07-02 PROBLEM — L97.212 VENOUS STASIS ULCER OF RIGHT CALF WITH FAT LAYER EXPOSED WITHOUT VARICOSE VEINS (HCC): Status: ACTIVE | Noted: 2020-07-02

## 2020-07-02 PROBLEM — L97.222 VENOUS STASIS ULCER OF LEFT CALF WITH FAT LAYER EXPOSED WITHOUT VARICOSE VEINS (HCC): Status: ACTIVE | Noted: 2020-07-02

## 2020-07-02 PROBLEM — I87.2 VENOUS STASIS ULCER OF LEFT CALF WITH FAT LAYER EXPOSED WITHOUT VARICOSE VEINS (HCC): Status: ACTIVE | Noted: 2020-07-02

## 2020-07-02 PROBLEM — I87.2 VENOUS STASIS ULCER OF RIGHT CALF WITH FAT LAYER EXPOSED WITHOUT VARICOSE VEINS (HCC): Status: ACTIVE | Noted: 2020-07-02

## 2020-07-02 LAB
ALBUMIN SERPL-MCNC: 3.9 G/DL (ref 3.4–5)
ANION GAP SERPL CALCULATED.3IONS-SCNC: 12 MMOL/L (ref 3–16)
BUN BLDV-MCNC: 83 MG/DL (ref 7–20)
CALCIUM SERPL-MCNC: 9.7 MG/DL (ref 8.3–10.6)
CHLORIDE BLD-SCNC: 92 MMOL/L (ref 99–110)
CO2: 29 MMOL/L (ref 21–32)
CREAT SERPL-MCNC: 2 MG/DL (ref 0.6–1.1)
GFR AFRICAN AMERICAN: 31
GFR NON-AFRICAN AMERICAN: 26
GLUCOSE BLD-MCNC: 103 MG/DL (ref 70–99)
GLUCOSE BLD-MCNC: 110 MG/DL (ref 70–99)
GLUCOSE BLD-MCNC: 124 MG/DL (ref 70–99)
GLUCOSE BLD-MCNC: 138 MG/DL (ref 70–99)
GLUCOSE BLD-MCNC: 150 MG/DL (ref 70–99)
PERFORMED ON: ABNORMAL
PHOSPHORUS: 3.9 MG/DL (ref 2.5–4.9)
POTASSIUM SERPL-SCNC: 4.8 MMOL/L (ref 3.5–5.1)
SODIUM BLD-SCNC: 133 MMOL/L (ref 136–145)

## 2020-07-02 PROCEDURE — 2580000003 HC RX 258: Performed by: NURSE PRACTITIONER

## 2020-07-02 PROCEDURE — 6370000000 HC RX 637 (ALT 250 FOR IP): Performed by: INTERNAL MEDICINE

## 2020-07-02 PROCEDURE — 94640 AIRWAY INHALATION TREATMENT: CPT

## 2020-07-02 PROCEDURE — 6360000002 HC RX W HCPCS: Performed by: INTERNAL MEDICINE

## 2020-07-02 PROCEDURE — 2580000003 HC RX 258: Performed by: INTERNAL MEDICINE

## 2020-07-02 PROCEDURE — 6370000000 HC RX 637 (ALT 250 FOR IP): Performed by: PEDIATRICS

## 2020-07-02 PROCEDURE — 36415 COLL VENOUS BLD VENIPUNCTURE: CPT

## 2020-07-02 PROCEDURE — 6360000002 HC RX W HCPCS: Performed by: NURSE PRACTITIONER

## 2020-07-02 PROCEDURE — 2060000000 HC ICU INTERMEDIATE R&B

## 2020-07-02 PROCEDURE — 80069 RENAL FUNCTION PANEL: CPT

## 2020-07-02 PROCEDURE — 99233 SBSQ HOSP IP/OBS HIGH 50: CPT | Performed by: NURSE PRACTITIONER

## 2020-07-02 RX ADMIN — ARIPIPRAZOLE 10 MG: 10 TABLET ORAL at 08:24

## 2020-07-02 RX ADMIN — ATORVASTATIN CALCIUM 80 MG: 80 TABLET, FILM COATED ORAL at 20:28

## 2020-07-02 RX ADMIN — PANTOPRAZOLE SODIUM 40 MG: 40 TABLET, DELAYED RELEASE ORAL at 08:24

## 2020-07-02 RX ADMIN — MIDODRINE HYDROCHLORIDE 7.5 MG: 5 TABLET ORAL at 08:23

## 2020-07-02 RX ADMIN — HEPARIN SODIUM 5000 UNITS: 5000 INJECTION INTRAVENOUS; SUBCUTANEOUS at 15:57

## 2020-07-02 RX ADMIN — BUSPIRONE HYDROCHLORIDE 30 MG: 5 TABLET ORAL at 20:28

## 2020-07-02 RX ADMIN — ONDANSETRON 4 MG: 2 INJECTION INTRAMUSCULAR; INTRAVENOUS at 20:25

## 2020-07-02 RX ADMIN — INSULIN LISPRO 1 UNITS: 100 INJECTION, SOLUTION INTRAVENOUS; SUBCUTANEOUS at 20:29

## 2020-07-02 RX ADMIN — PANTOPRAZOLE SODIUM 40 MG: 40 TABLET, DELAYED RELEASE ORAL at 17:29

## 2020-07-02 RX ADMIN — FUROSEMIDE 40 MG: 10 INJECTION, SOLUTION INTRAMUSCULAR; INTRAVENOUS at 08:24

## 2020-07-02 RX ADMIN — LAMOTRIGINE 150 MG: 100 TABLET ORAL at 20:28

## 2020-07-02 RX ADMIN — BENZTROPINE MESYLATE 1 MG: 1 TABLET ORAL at 20:28

## 2020-07-02 RX ADMIN — ONDANSETRON 4 MG: 2 INJECTION INTRAMUSCULAR; INTRAVENOUS at 01:51

## 2020-07-02 RX ADMIN — LAMOTRIGINE 150 MG: 100 TABLET ORAL at 08:23

## 2020-07-02 RX ADMIN — Medication 10 ML: at 08:24

## 2020-07-02 RX ADMIN — BUPRENORPHINE AND NALOXONE 1 FILM: 8; 2 FILM BUCCAL; SUBLINGUAL at 08:23

## 2020-07-02 RX ADMIN — ASPIRIN 81 MG: 81 TABLET, COATED ORAL at 08:23

## 2020-07-02 RX ADMIN — Medication 1 SQUIRT: at 22:44

## 2020-07-02 RX ADMIN — MIDODRINE HYDROCHLORIDE 7.5 MG: 5 TABLET ORAL at 17:29

## 2020-07-02 RX ADMIN — HEPARIN SODIUM 5000 UNITS: 5000 INJECTION INTRAVENOUS; SUBCUTANEOUS at 06:37

## 2020-07-02 RX ADMIN — TIOTROPIUM BROMIDE INHALATION SPRAY 2 PUFF: 3.12 SPRAY, METERED RESPIRATORY (INHALATION) at 08:07

## 2020-07-02 RX ADMIN — BUPRENORPHINE AND NALOXONE 1 FILM: 8; 2 FILM BUCCAL; SUBLINGUAL at 20:29

## 2020-07-02 RX ADMIN — PROMETHAZINE HYDROCHLORIDE 12.5 MG: 25 TABLET ORAL at 18:12

## 2020-07-02 RX ADMIN — BUSPIRONE HYDROCHLORIDE 30 MG: 5 TABLET ORAL at 08:23

## 2020-07-02 RX ADMIN — CLOPIDOGREL BISULFATE 75 MG: 75 TABLET ORAL at 08:24

## 2020-07-02 RX ADMIN — MIDODRINE HYDROCHLORIDE 7.5 MG: 5 TABLET ORAL at 12:21

## 2020-07-02 RX ADMIN — FUROSEMIDE 5 MG/HR: 10 INJECTION, SOLUTION INTRAMUSCULAR; INTRAVENOUS at 11:07

## 2020-07-02 ASSESSMENT — PAIN SCALES - GENERAL
PAINLEVEL_OUTOF10: 0

## 2020-07-02 NOTE — CONSULTS
Department of Podiatric Surgery  Consult Note      Reason for Consult:  B/L lower extremity ulcers, chronic  Requesting Physician:  Dr. Verena Petty:  BB/L lower extremity chronic ulcerations    HISTORY OF PRESENT ILLNESS:                The patient is a 62 y.o. female with significant past medical history of DM2, acute on chronic CHF, acute on chronic renal failure, hyperkalemia, hyponatremia. Podiatry was consulted for chronic b/l lower extremity leg ulcerations. Patient already follows at the [facility] wound center. There was some concern for possible deeper infection on the right foot; a nuclear medicine scan was order but cancelled. Possible purulent discharge was noted at one point. Currently the patient is afebrile, and no leukocytosis. She is a diabetic and most recent A1c taken in house was 7.2%. She has no other pedal complaints. Wound care has seen her and recommended b/l compression dressings from the toes to the knees b/l. Patient states that she was do to have a vascular procedure by Dr. Kath Blanchard, but is now delayed because of admission.     Past Medical History:        Diagnosis Date    Acute kidney injury ([address] 12/25/2019    Acute on chronic congestive heart failure (HCC)     Alcohol dependence ([address] 3/10/2010    Cellulitis 6/3/2020    Diabetic ulcer of left foot associated with type 2 diabetes mellitus ([address] 1/18/2020    Diabetic ulcer of toe of right foot associated with type 2 diabetes mellitus ([address] 1/18/2020    MI (myocardial infarction) ([address]     Positive FIT (fecal immunochemical test) 2/28/2020     Past Surgical History:        Procedure Laterality Date    ARTERIAL BYPASS SURGRY Left 01/06/2016    Axillary/Subclavian to Brachial Bypass w/reversed SVG    CARDIAC CATHETERIZATION  03/27/2018    Dr. Pinedo Mems - at [address]  01/20/2020    Dr. Karo Amos      pancreatitis post surgery    CORONARY ANGIOPLASTY WITH STENT PLACEMENT  03/16/2018    MELODY- 3.5 x 38 and 3.5 x 20 to Cx    CORONARY ANGIOPLASTY WITH STENT PLACEMENT  01/03/2018    MELODY- 3.0 x 38 and 2.75 x 26 and 2.75 x 8 and 2.75 x 22 to the LAD    CORONARY ARTERY BYPASS GRAFT N/A 1/27/2020    CORONARY ARTERY BYPASS GRAFTING X 1, ON PUMP BEATING HEART, INTERNAL MAMMARY ARTERY TO LEFT ANTERIOR DESCENDING ARTERY, VAS CATH INSERTION, URI, PLATELET GEL APPLICATION, 5 LEVEL BILATERAL INTERCOSTAL NERVE BLOCK, STERNAL PLATING performed by Cecille Gross MD at [address] Right 5/16/2019    fem-pop, performed by Jeffery Pinon MD at [address]  02/14/2019    CardioMEMs insertion for CHF    ROTATOR CUFF REPAIR Left 04/22/2016    TONSILLECTOMY      TRANSLUMINAL ANGIOPLASTY Left 10/08/2019    with stenting, at [facility]    TRANSLUMINAL ANGIOPLASTY Left 04/29/2020    of the [facility] re-occlusion    TUMOR EXCISION      benign tumors X 2 chest & axilla    UPPER GASTROINTESTINAL ENDOSCOPY  4/25/2013    BX BAYLEE dunne, gastritis    UPPER GASTROINTESTINAL ENDOSCOPY  12/10/2015    UPPER GASTROINTESTINAL ENDOSCOPY  01/06/2017    Gastritis    VASCULAR SURGERY Left 12/08/2015    upper extremity and mesenteric angiogram (diagnostic only)     Current Medications:    Current Facility-Administered Medications: furosemide (LASIX) 100 mg in dextrose 5 % 100 mL infusion, 5 mg/hr, Intravenous, Continuous  perflutren lipid microspheres (DEFINITY) injection 1.65 mg, 1.5 mL, Intravenous, ONCE PRN  zinc oxide (TRIAD HYDROPHILIC) paste 1 Squirt, 1 Squirt, Topical, Daily  aspirin EC tablet 81 mg, 81 mg, Oral, Daily  atorvastatin (LIPITOR) tablet 80 mg, 80 mg, Oral, Nightly  benztropine (COGENTIN) tablet 1 mg, 1 mg, Oral, Nightly  ARIPiprazole (ABILIFY) tablet 10 mg, 10 mg, Oral, Daily  buprenorphine-naloxone (SUBOXONE) 8-2 MG SL film 1 Film, 1 Film, Sublingual, BID  clopidogrel (PLAVIX) tablet 75 mg, 75 mg, Oral, Daily  busPIRone (BUSPAR) tablet 30 mg, 30 mg, Oral, BID  lamoTRIgine (LAMICTAL) tablet 150 mg, 150 mg, Oral, BID  pantoprazole (PROTONIX) tablet 40 mg, 40 mg, Oral, BID  tiotropium (SPIRIVA RESPIMAT) 2.5 MCG/ACT inhaler 2 puff, 2 puff, Inhalation, Daily  sodium chloride flush 0.9 % injection 10 mL, 10 mL, Intravenous, 2 times per day  sodium chloride flush 0.9 % injection 10 mL, 10 mL, Intravenous, PRN  acetaminophen (TYLENOL) tablet 650 mg, 650 mg, Oral, Q6H PRN **OR** acetaminophen (TYLENOL) suppository 650 mg, 650 mg, Rectal, Q6H PRN  polyethylene glycol (GLYCOLAX) packet 17 g, 17 g, Oral, Daily PRN  promethazine (PHENERGAN) tablet 12.5 mg, 12.5 mg, Oral, Q6H PRN **OR** ondansetron (ZOFRAN) injection 4 mg, 4 mg, Intravenous, Q6H PRN  heparin (porcine) injection 5,000 Units, 5,000 Units, Subcutaneous, 3 times per day  glucose (GLUTOSE) 40 % oral gel 15 g, 15 g, Oral, PRN  dextrose 50 % IV solution, 12.5 g, Intravenous, PRN  glucagon (rDNA) injection 1 mg, 1 mg, Intramuscular, PRN  dextrose 5 % solution, 100 mL/hr, Intravenous, PRN  insulin lispro (HUMALOG) injection vial 0-6 Units, 0-6 Units, Subcutaneous, TID WC  insulin lispro (HUMALOG) injection vial 0-3 Units, 0-3 Units, Subcutaneous, Nightly  midodrine (PROAMATINE) tablet 7.5 mg, 7.5 mg, Oral, TID WC  Allergies:   Lisinopril  Social History:    TOBACCO:  Currently smokes. Type of tobacco used:  Cigarettes. Quantity per day:  1 pack(s). Duration of tobacco use: Past attempts to quit? Yes; Smoking Cessation Modalities used:  Chantix. Smoking cessation advice provided?  yes.   Family History:       Problem Relation Age of Onset    Heart Disease Maternal Grandmother     Cancer Mother         lung and brain cancer    Cancer Maternal Aunt         lung and brain cancer     REVIEW OF SYSTEMS:    CONSTITUTIONAL:  negative for  fevers, chills and sweats  RESPIRATORY:  negative for  wheezing and chest pain  PHYSICAL EXAM:      Vitals:    BP 98/66   Pulse 84   Temp 97.8 °F (36.6 °C) (Oral)   Resp 18   Ht 5' 4\" (1.626 m)   Wt 249 lb 4.8 oz (113.1 kg)   LMP 10/24/2012   SpO2 94%   BMI 42.79 kg/m²     LABS:   Recent Labs     07/01/20  0508   WBC 6.2   HGB 10.8*   HCT 34.3*        Recent Labs     07/02/20  0451   *   K 4.8   CL 92*   CO2 29   PHOS 3.9   BUN 83*   CREATININE 2.0*     No results for input(s): PROT, INR, APTT in the last 72 hours. LOWER EXTREMITY EXAMINATION   SKIN:      Multiple ulcers to each right and left leg along anterior shin. All ulcers extend to subcutaneous tissue with mostly fibrotic tissue present. No undermining, tracking, or probe to bone. No local signs of infection. Callus to the plantar right 5th met head and the left 3rd toe. After debridement, there was a small pinpoint ulcer to the right foot measuring less than 1 mm diameter around, and no ulcer to the left foot. No active drainage, pain on palpation, or any other local signs of infection. NEUROLOGIC:    Protective sensation is decreased to the toes b/l. VASCULAR:      Faintly palpable DP pulse b/l. Non-palpable PT pulse b/l. Feet are cool to the touch. Slight purple discoloration to the toes b/l. +1 pitting edema to the b/l lower legs. MUSCULOSKELETAL:    Muscle strength 4/5 for all major muscle groups b/l lower extremity. Assessment:    1. Venous insufficiency, b/l  2. Ulcers to subQ b/l legs  3. DM2 with peripheral neuropathy  4. PAD with ulcerations    Plan:     - Patient examined and evaluated. - Patient's ulcerations appear clinically stable. No lab work to suggest deeper infection at this time.   - No osseous destruction on x-ray around 5th met head right foot. - No leukocytosis. - I do not believe nuclear medicine scan will be needed at this time. - Agree with wound care assessment that patient will need compression dressings for venous insufficiency from toes to the knees. Wear at all times, unless resting with legs elevated.    - No ABX needed for the foot at this time. - Patient is stable for discharge from a podiatry standpoint at this time.   - Recommend close follow up with the wound care center and Dr. Tommy Law. She will need intervention on the right leg if medically stable to undergo intervention.   - Patient is stable from a podiatry standpoint for discharge. - I will sign off at this time. Thank you for the opportunity to take part in the patient's care. Please feel free to page or call with any questions or concerns.     Cynthia Butcher 39

## 2020-07-02 NOTE — FLOWSHEET NOTE
07/02/20 0821   Assessment   Charting Type Shift assessment   Neurological   Neuro (WDL) WDL   Level of Consciousness 0   [place] Coma Scale   Eye Opening 4   Best Verbal Response 5   Best Motor Response 6   [place] Coma Scale Score 15   HEENT   HEENT (WDL) X   Right Eye Intact; Impaired vision   Left Eye Intact; Impaired vision   Teeth Missing teeth   Mucous Membrane Moist;Pink; Intact   Voice Normal   Respiratory   Respiratory (WDL) WDL   Respiratory Pattern Regular   Respiratory Depth Normal   Respiratory Quality/Effort Unlabored   Chest Assessment Chest expansion symmetrical;Trachea midline   L Breath Sounds Clear   R Breath Sounds Clear   Cardiac   Cardiac (WDL) WDL   Cardiac Regularity Regular   Heart Sounds S1, S2   Cardiac Rhythm NSR   Rhythm Interpretation   Pulse 82   Cardiac Monitor   Telemetry Monitor On Yes   Telemetry Audible Yes   Telemetry Alarms Set Yes   Gastrointestinal   Abdominal (WDL) WDL   Peripheral Vascular   Peripheral Vascular (WDL) X   RLE Edema +2;Pitting   LLE Edema +2;Pitting   RLE Neurovascular Assessment   Capillary Refill Less than/equal to 3 seconds   Color Red   Temperature Warm   LLE Neurovascular Assessment   Capillary Refill Less than/equal to 3 seconds   Color Red   Temperature Warm   Skin Color/Condition   Skin Color/Condition (WDL) X   Skin Color Red  (BLE)   Skin Condition/Temp Dry;Swollen; Warm   Skin Integrity   Skin Integrity (WDL) X   Skin Integrity Abrasion;Blister   Location Scattered   Musculoskeletal   Musculoskeletal (WDL) WDL   Genitourinary   Genitourinary (WDL) WDL   Anus/Rectum   Anus/Rectum (WDL) WDL   Wound 06/18/20 #1, left lower leg cluster, venous, partial thickness, onset 6/1/2020   Date First Assessed/Time First Assessed: 06/18/20 1431   Primary Wound Type: Venous Ulcer  Wound Description (Comments): #1, left lower leg cluster, venous, partial thickness, onset 6/1/2020   Dressing Status Dry;Clean; Intact   Dressing/Treatment Pharmaceutical agent (see MAR);4x4;Roll gauze; Ace wrap   Wound Assessment Other (Comment)  (GEORGE)   Wound 06/18/20 #2, right lower leg cluster, venous, full thickness, onset 6/1/2020   Date First Assessed/Time First Assessed: 06/18/20 1432   Primary Wound Type: Venous Ulcer  Wound Description (Comments): #2, right lower leg cluster, venous, full thickness, onset 6/1/2020   Dressing Status Intact;Dry;Clean   Dressing/Treatment Pharmaceutical agent (see MAR);4x4;Roll gauze; Ace wrap   Wound Assessment   (GEORGE)   Psychosocial   Psychosocial (WDL) WDL

## 2020-07-02 NOTE — CARE COORDINATION
Case Management/Follow up:    Chart reviewed for length of stay. Hospital day #  3     Unit:  C-4    Diagnosis and current status as per MD progress: Acute renal failure on chronic kidney disease stage III: likely due to  Cardiorenal syndrome. Nephro assisting. Starting lasix gtt. Monitor and avoid nephrotoxins.      Hyperkalemia:  Received appropriate treatment in the emergency department. No EKG findings. Stopped potassium supplementation. Monitor electrolytes  Keep on telemetry     Hyponatremia: likely hypovolemic due to CHF. Mgt per nephro. Monitor      Acute on chronic systolic CHF : Ef 08%. Cardio recs appreciated. Starting lasix gtt. Echo with EF 25%, apical hypokinesis. cardio with plans for possible cath at some point when renal function stable.      Diabetes mellitus type 2 with hypoglycemia: Scheduled preprandial insulin as well as Lantus are on hold. No repeat hypoglycemia noted overnight. Monitor and continue SSI and hypoglycemia protocol      Chronic leg wounds: follows at wound care clinic  with no overt signs of infection currently. Wound care consulted. Podiatry consulted for chronic right foot wound. Unable to get nuclear scan of foot. Defer additional testing to podiatry.      Anticipated d/c date: pending clinical course and podiatry consult    Expected plan for discharge: pending progress, Podiatry input and PT/OT recs when appropriate. Potential barriers: none identified    Precert required/date initiated: will be needed for SNF placement     Confirmed plan with patient and/or family: yes, prior    Comments: Pt is IPTA and denied needs during assessment. Will follow pending specialty input.

## 2020-07-02 NOTE — PROGRESS NOTES
[facility]       Progress Note           Subjective/   62y.o. year old female who we are seeing in consultation for SHAUNNA. HPI:   Renal function improving with diuresis, urine output of 2.9 liters over last 24 hours. States sob better, still with significant LE edema. ROS:  Intake adequate, no fevers. Objective/   GEN:  Chronically ill, BP 98/66   Pulse 84   Temp 97.8 °F (36.6 °C) (Oral)   Resp 18   Ht 5' 4\" (1.626 m)   Wt 249 lb 4.8 oz (113.1 kg)   LMP 10/24/2012   SpO2 94%   BMI 42.79 kg/m²   HEENT: non-icteric, no JVD  CV: S1, S2 without m/r/g; ++ LE edema  RESP: CTA B without w/r/r; breathing wnl  ABD: +bs, soft, nt, no hsm  SKIN: warm, no rashes    Data/  Recent Labs     07/01/20  0508   WBC 6.2   HGB 10.8*   HCT 34.3*   MCV 76.5*        Recent Labs     07/01/20  0508 07/01/20  1517 07/02/20  0451   * 130* 133*   K 4.0 4.6 4.8   CL 91* 90* 92*   CO2 29 27 29   GLUCOSE 132* 165* 124*   PHOS 4.3  --  3.9   MG 2.80*  --   --    BUN 97* 87* 83*   CREATININE 2.2* 2.0* 2.0*   LABGLOM 23* 26* 26*   GFRAA 28* 31* 31*       Assessment/     Acute Kidney Injury:  KDIGO stage 3  - Etiology:  Pre-renal due to decompensated heart failure. - Data: Pierre Torres is up, she has edema, cardiomems readings showed higher PA diastolic.  - Kidney function is improving with trial of diuretic.     Heart Failure:  On room air.  Weight is up to 249 lbs.  Has LE edema.  History of EF = 30% with severe MR     Hyperkalemia:  Due to acute kidney injury.  Improving with diuresis.      Hyponatremia:  Related to heart failure and renal failure, exam and weight indicate hypervolemic    Plan/     - Agree with trial of lasix drip - increase up to 10 mg/hour  - Continue midodrine 7.5 mg po tid  - Low salt diet 2 grams/day & 1.5 liter/day fluid restriction  - Trend labs

## 2020-07-02 NOTE — PROGRESS NOTES
Hospitalist Progress Note      PCP: Karime Henderson PA-C    Date of Admission: 6/29/2020    Chief Complaint: Weakness    Hospital Course: H & P reviewed. Admitted with SHAUNNA on CKD. Subjective:     Pt denied any complaints. SOB improved. Medications:  Reviewed    Infusion Medications    furosemide (LASIX) 1mg/ml infusion 5 mg/hr (07/02/20 1107)    dextrose       Scheduled Medications    zinc oxide  1 Squirt Topical Daily    aspirin EC  81 mg Oral Daily    atorvastatin  80 mg Oral Nightly    benztropine  1 mg Oral Nightly    ARIPiprazole  10 mg Oral Daily    buprenorphine-naloxone  1 Film Sublingual BID    clopidogrel  75 mg Oral Daily    busPIRone  30 mg Oral BID    lamoTRIgine  150 mg Oral BID    pantoprazole  40 mg Oral BID    tiotropium  2 puff Inhalation Daily    sodium chloride flush  10 mL Intravenous 2 times per day    heparin (porcine)  5,000 Units Subcutaneous 3 times per day    insulin lispro  0-6 Units Subcutaneous TID WC    insulin lispro  0-3 Units Subcutaneous Nightly    midodrine  7.5 mg Oral TID WC     PRN Meds: perflutren lipid microspheres, sodium chloride flush, acetaminophen **OR** acetaminophen, polyethylene glycol, promethazine **OR** ondansetron, glucose, dextrose, glucagon (rDNA), dextrose      Intake/Output Summary (Last 24 hours) at 7/2/2020 1414  Last data filed at 7/2/2020 1334  Gross per 24 hour   Intake 1080 ml   Output 1700 ml   Net -620 ml       Physical Exam Performed:    BP 98/65   Pulse 86   Temp 97.7 °F (36.5 °C) (Oral)   Resp 18   Ht 5' 4\" (1.626 m)   Wt 249 lb 4.8 oz (113.1 kg)   LMP 10/24/2012   SpO2 96%   BMI 42.79 kg/m²     General appearance: No apparent distress, appears stated age and cooperative. HEENT: Pupils equal, round, Conjunctivae/corneas clear. Neck: Supple, with full range of motion. No jugular venous distention. Trachea midline. Respiratory:  Normal respiratory effort.  Clear to auscultation, bilaterally without Rales/Wheezes/Rhonchi. Cardiovascular: Regular rate and rhythm with normal S1/S2 without murmurs, rubs or gallops. Abdomen: Soft, non-tender, non-distended with normal bowel sounds. Musculoskeletal: No clubbing, cyanosis + bilat pedal edema     Skin:  Chronic leg wounds on bilat shins with no overt discharge   Neurologic:  Alert speech clear with no overt facial droop  Psychiatric: Alert and oriented, thought content appropriate, normal insight      Labs:   Recent Labs     06/29/20  1707 07/01/20  0508   WBC 8.0 6.2   HGB 11.7* 10.8*   HCT 37.0 34.3*    302     Recent Labs     07/01/20  0508 07/01/20  1517 07/02/20  0451   * 130* 133*   K 4.0 4.6 4.8   CL 91* 90* 92*   CO2 29 27 29   BUN 97* 87* 83*   CREATININE 2.2* 2.0* 2.0*   CALCIUM 9.6 9.7 9.7   PHOS 4.3  --  3.9     Recent Labs     06/29/20  1707   AST 19   ALT 15   BILITOT 0.7   ALKPHOS 207*     No results for input(s): INR in the last 72 hours. Recent Labs     06/29/20  1707 07/01/20  0831   TROPONINI 0.06* 0.02*       Urinalysis:      Lab Results   Component Value Date    NITRU Negative 06/29/2020    WBCUA 3-5 09/28/2019    BACTERIA Rare 09/28/2019    RBCUA None seen 09/28/2019    BLOODU Negative 06/29/2020    SPECGRAV 1.015 06/29/2020    GLUCOSEU Negative 06/29/2020       Radiology:  XR FOOT RIGHT (MIN 3 VIEWS)   Final Result   Soft tissue swelling without acute bony osseous abnormality. No cortical   destruction. RECOMMENDATION:   Consider radionuclide bone imaging. XR CHEST PORTABLE   Final Result   Negative portable study.                  Assessment/Plan:    Active Hospital Problems    Diagnosis    Acute on chronic renal insufficiency [N28.9, N18.9]    Acute on chronic renal failure (HCC) [N17.9, N18.9]    Hyperkalemia [E87.5]    Acute renal failure superimposed on chronic kidney disease, on chronic dialysis ([facility] [address] [N17.9, N18.9, Z99.2]    Hypotension - chronic [I95.9]    Acute on chronic systolic congestive heart failure (HCC) [I50.23]         Acute renal failure on chronic kidney disease stage III: likely due to  Cardiorenal syndrome. Nephro assisting. Starting lasix gtt. Monitor and avoid nephrotoxins.      Hyperkalemia:  Received appropriate treatment in the emergency department. No EKG findings. Stopped potassium supplementation. Monitor electrolytes  Keep on telemetry     Hyponatremia: likely hypovolemic due to CHF. Mgt per nephro. Monitor     Acute on chronic systolic CHF : Ef 45%. Cardio recs appreciated. Starting lasix gtt. Echo with EF 25%, apical hypokinesis. cardio with plans for possible cath at some point when renal function stable.      Diabetes mellitus type 2 with hypoglycemia: Scheduled preprandial insulin as well as Lantus are on hold. No repeat hypoglycemia noted overnight. Monitor and continue SSI and hypoglycemia protocol     Chronic leg wounds: follows at wound care clinic  with no overt signs of infection currently. Wound care consulted. Podiatry consulted for chronic right foot wound. Unable to get nuclear scan of foot. Defer additional testing to podiatry. DVT Prophylaxis: Subcutaneous heparin  Diet: DIET CARDIAC; Low Sodium (2 GM);  Daily Fluid Restriction: 1800 ml  Code Status: Full Code    PT/OT Eval Status: Requested    Dispo - hard to say, pending clinical course     Livan Delgado MD

## 2020-07-02 NOTE — PLAN OF CARE
Problem: Falls - Risk of:  Goal: Will remain free from falls  Description: Will remain free from falls  7/2/2020 1029 by Fabi Ho RN  Outcome: Ongoing  Note: Patient educated on fall risk precautions. Patient bed in low position, bed alarm in place, call light and bedside table within reach. No falls reported this shift.     7/2/2020 0332 by Virginia Cam RN  Outcome: Ongoing  Goal: Absence of physical injury  Description: Absence of physical injury  7/2/2020 1029 by Fabi Ho RN  Outcome: Ongoing  7/2/2020 0332 by Virginia Cam RN  Outcome: Ongoing     Problem: SAFETY  Goal: Free from accidental physical injury  7/2/2020 1029 by Fabi Ho RN  Outcome: Ongoing  7/2/2020 0332 by Virginia Cam RN  Outcome: Ongoing  Goal: Free from intentional harm  7/2/2020 1029 by Fabi Ho RN  Outcome: Ongoing  7/2/2020 0332 by Virginia Cam RN  Outcome: Ongoing     Problem: DAILY CARE  Goal: Daily care needs are met  7/2/2020 1029 by Fabi Ho RN  Outcome: Ongoing  7/2/2020 0332 by Virginia Cam RN  Outcome: Ongoing     Problem: PAIN  Goal: Patient's pain/discomfort is manageable  7/2/2020 1029 by Fabi Ho RN  Outcome: Ongoing  7/2/2020 0332 by Virginia Cam RN  Outcome: Ongoing     Problem: SKIN INTEGRITY  Goal: Skin integrity is maintained or improved  7/2/2020 1029 by Fabi Ho RN  Outcome: Ongoing  7/2/2020 0332 by Virginia Cam RN  Outcome: Ongoing     Problem: DISCHARGE BARRIERS  Goal: Patient's continuum of care needs are met  7/2/2020 1029 by Fabi Ho RN  Outcome: Ongoing  7/2/2020 0332 by Virginia Cam RN  Outcome: Ongoing     Problem: Pain:  Goal: Pain level will decrease  Description: Pain level will decrease  7/2/2020 1029 by Fabi Ho RN  Outcome: Ongoing  7/2/2020 0332 by Virginia Cam RN  Outcome: Ongoing  Goal: Control of acute pain  Description: Control of acute pain  7/2/2020 1029 by Fabi Ho RN  Outcome: Ongoing  7/2/2020 0332 by Virginia Innocent, RN  Outcome: Ongoing  Goal: Control of chronic pain  Description: Control of chronic pain  7/2/2020 1029 by Campos Mansfield RN  Outcome: Ongoing  7/2/2020 0332 by Meaghan Hawthorne RN  Outcome: Ongoing

## 2020-07-02 NOTE — PLAN OF CARE
Problem: Falls - Risk of:  Goal: Will remain free from falls  Description: Will remain free from falls  7/2/2020 0332 by Antoine Rebollar RN  Outcome: Ongoing  7/1/2020 1524 by Chas Wylie RN  Outcome: Ongoing  Note: Patient educated on fall risk precautions. Patient bed in low position, bed alarm in place, call light and bedside table within reach. No falls reported this shift.     Goal: Absence of physical injury  Description: Absence of physical injury  7/2/2020 0332 by Antoine Rebollar RN  Outcome: Ongoing  7/1/2020 1524 by Chas Wylie RN  Outcome: Ongoing     Problem: SAFETY  Goal: Free from accidental physical injury  7/2/2020 0332 by Antoine Rebollar RN  Outcome: Ongoing  7/1/2020 1524 by Chas Wylie RN  Outcome: Ongoing  Goal: Free from intentional harm  7/2/2020 0332 by Antoine Rebollar RN  Outcome: Ongoing  7/1/2020 1524 by Chas Wylie RN  Outcome: Ongoing     Problem: DAILY CARE  Goal: Daily care needs are met  7/2/2020 0332 by Antoine Rebollar RN  Outcome: Ongoing  7/1/2020 1524 by Chas Wylie RN  Outcome: Ongoing     Problem: PAIN  Goal: Patient's pain/discomfort is manageable  7/2/2020 0332 by Antoine Rebollar RN  Outcome: Ongoing  7/1/2020 1524 by Chas Wylie RN  Outcome: Ongoing     Problem: SKIN INTEGRITY  Goal: Skin integrity is maintained or improved  7/2/2020 0332 by Antoine Rebollar RN  Outcome: Ongoing  7/1/2020 1524 by Chas Wylie RN  Outcome: Ongoing     Problem: DISCHARGE BARRIERS  Goal: Patient's continuum of care needs are met  7/2/2020 0332 by Antoine Rebollar RN  Outcome: Ongoing  7/1/2020 1524 by Chas Wylie RN  Outcome: Ongoing     Problem: Pain:  Goal: Pain level will decrease  Description: Pain level will decrease  7/2/2020 0332 by Antoine Rebollar RN  Outcome: Ongoing  7/1/2020 1524 by Chas Wylie RN  Outcome: Ongoing  Goal: Control of acute pain  Description: Control of acute pain  7/2/2020 0332 by Antoine Rebollar RN  Outcome: Ongoing  7/1/2020 1524 by Chas Wylie RN  Outcome: Ongoing  Goal: Control of chronic pain  Description: Control of chronic pain  7/2/2020 0332 by Kenyon Chávez RN  Outcome: Ongoing  7/1/2020 1524 by Kristan Bowling RN  Outcome: Ongoing

## 2020-07-02 NOTE — PROGRESS NOTES
[address]   Daily Progress Note    Admit Date:  6/29/2020  HPI:    Chief Complaint   Patient presents with    Shortness of Breath    Abnormal Lab     had blood work done at PCP office today, they called with results and sent patient to ER for high potassium and said that kidney function was \"critical\" \"they told me if i didnt come to the ER today i would need dialysis tomorrow\". Interval history: Hiro An is admitted 6/29/20 with abnormal labs with SHAUNNA and CHF without improvement despite multiple medication changes as outpatient. Hx CAD s/p CABG 8/3900, DM, CKD, systolic heart failure, S/P Cardiomems implant 2/2019. Treated with IVFs initially and then started on diuretics. Subjective:  Ms. Maikol Santo frustrated with being here. Responding to diuresis. Edema is the same. Breathing is stable. No chest pain.      Objective:   /73   Pulse 82   Temp 97.8 °F (36.6 °C) (Oral)   Resp 16   Ht 5' 4\" (1.626 m)   Wt 249 lb 4.8 oz (113.1 kg)   LMP 10/24/2012   SpO2 95%   BMI 42.79 kg/m²       Intake/Output Summary (Last 24 hours) at 7/2/2020 1605  Last data filed at 7/2/2020 0600  Gross per 24 hour   Intake 840 ml   Output 2900 ml   Net -2060 ml       NYHA: III    Physical Exam:  General:  Awake, alert, NAD  Skin:  Warm and dry  Neck:  JVD difficult to assess due to body habitus  Chest:  Clear to auscultation, no wheezes/rhonchi/rales  Cardiovascular:  RRR S1S2, no m/r/g  Abdomen:  Soft, nontender, +bowel sounds  Extremities:  + BLE edema, mulitple BLE wounds    Medications:    zinc oxide  1 Squirt Topical Daily    furosemide  40 mg Intravenous BID    aspirin EC  81 mg Oral Daily    atorvastatin  80 mg Oral Nightly    benztropine  1 mg Oral Nightly    ARIPiprazole  10 mg Oral Daily    buprenorphine-naloxone  1 Film Sublingual BID    clopidogrel  75 mg Oral Daily    busPIRone  30 mg Oral BID    lamoTRIgine  150 mg Oral BID    pantoprazole  40 mg Oral BID    tiotropium 2 puff Inhalation Daily    sodium chloride flush  10 mL Intravenous 2 times per day    heparin (porcine)  5,000 Units Subcutaneous 3 times per day    insulin lispro  0-6 Units Subcutaneous TID WC    insulin lispro  0-3 Units Subcutaneous Nightly    midodrine  7.5 mg Oral TID WC      dextrose         Lab Data:  CBC:   Recent Labs     06/29/20  1707 07/01/20  0508   WBC 8.0 6.2   HGB 11.7* 10.8*    302     BMP:    Recent Labs     07/01/20  0508 07/01/20  1517 07/02/20  0451   * 130* 133*   K 4.0 4.6 4.8   CO2 29 27 29   BUN 97* 87* 83*   CREATININE 2.2* 2.0* 2.0*     INR:  No results for input(s): INR in the last 72 hours. BNP:    Recent Labs     06/29/20  1349 06/29/20  1707   PROBNP 3,001* 3,163*     Lab Results   Component Value Date    LVEF 38 01/24/2020     LHC 3/26/18 [place] [employer] promus premier 3.06mbm31bd CCx and 3.42p94vh CCx    Echo: 1/22/2020   Technically difficult examination secondary to habitus.  Agnieszka Menard left ventricular systolic function is severely reduced with an ejection fraction of 25 %.   There is severe hypokinesis of the anterior and apical walls consistent with   infarction within the territory of the left anterior descending artery.   Grade II diastolic dysfunction with elevated left ventricular filling  pressure.   Moderate to severe mitral regurgitation.   Mild tricuspid regurgitation.   is estimated at 53 mmHg consistent with moderate pulmonary hypertension   (Right atrial pressure of 3 mmHg).   If clinically necessary, consider URI for better evaluation of mitral   regurgitation.     Cath: 1/20/2020 Left Heart Cath  Dominance : Right  LM: 70-80% short distal stenosis  LAD: 70-80% short ostial stenosis, extending into takeoff of high first diagonal; large proximal to mid stented segment patent with jailed second diagonal  (80% ostial D2) and 70% in stent restenosis of most distal stent; distal to near apical LAD with minimal disease  LCx: 70-80% short ostial stenosis, prior to patent proximal to mid stents   RCA: large, dominant vessel with long 60% proximal to mid stenosis   LVEDP: 4 mmHG  LVG not done due to CKD.      URI 1/24/20:    Summary   Ejection fraction is visually estimated at 35-40%.   Moderate to severe mitral regurgitation with multiple jets visualized.   ERO estimated at 0.29 cm2.   Mild tricuspid regurgitation.   The left atrial appendage shows evidence of thrombus formation and low flow velocities.   Moderate amount of plaque in the aorta    Surgery: 1/27/20 Urgent coronary artery bypass grafting surgery x1 with pedicled left internal mammary artery to the LAD. Cardiopulmonary bypass with on-pump beating-heart technique, no cross-clamp. Transesophageal echo. Epiaortic ultrasound. [place] catheter placement. Doppler verification of grafts. Bilateral five-level intercostal nerve block with Exparel. Platelet gel application. Sternal plating. Vas-Cath placement. Echo 7/1/20  Summary   Technically difficult examination. Limited only f/u for LVEF. and mitral regurgitation. The left ventricular systolic function is severely reduced with an ejection   fraction of 25 %. There is hypokinesis of the apex, apical lateral, apical septum and   anteroseptum walls. Moderate mitral regurgitation. Active Problems:    Acute on chronic systolic congestive heart failure (HCC)    Hypotension - chronic    Acute on chronic renal failure (HCC)    Hyperkalemia    Acute renal failure superimposed on chronic kidney disease, on chronic dialysis ([address]    Acute on chronic renal insufficiency  Resolved Problems:    * No resolved hospital problems.  *      Assessment/Plan:  Acute on chronic systolic CHF:    Ischemic cardiomyopathy: LVEF 25% last TTE               - CardioMEMS implanted 2/2019, Baseline PAD is 25mmHg              -8L since admission  CAD              - s/p CABG 1/27/2020 (LIMA-LAD)              - Hx of PCi to RCA and LCx   Mitral regurg:              - moderate on recent echo   Pulmonary Hypertension:  SHAUNNA on CKD   PAD:               - SFA disease              - left subclavian bypass              Rt fem-pop bypass  DM  Multiple LE wounds  Right foot wound- Patient reports having to debride herself due to pus accumulation       Plan:   Foot work up per IM - ? Underlying infection contributing to hypotension  PAD 28-29 today; will Start lasix infusion 5mg/hr   Continue midodrine; would hold off on inotropic support for now given she is responding to diuresis  LVEF is low- need to consider BI-V AICD  Will need cardiac cath once able--> careful consideration for FAMILIA given A/CKD  HOLDING ACE/ARB. ARNI for SHAUNNA              - holding BB for hypotension  Continue aspirin and plavix and statin                 Ivanna Villareal, CNP, 7/2/2020, 10:22 AM

## 2020-07-02 NOTE — PROGRESS NOTES
Page to Dr. Matilde German: Kellee Spence from [employer] called and said she thought maybe the test ordered was inappropriate and thought maybe you might want an [place] white cell scan instead. I told her I would forward the message along to you, she said you could call her with any questions. Per nuc med, the scan that needs to be done would take several days and would need to be done next week as they do not start them on Friday. Dr. Matilde German made aware and given nuc med's phone number in case of questions.

## 2020-07-02 NOTE — PROGRESS NOTES
Notified Dr. Haskins [facility] access hospital office, [facility], @ [phone] 7/2/20 for podiatry consult. [phone] [facility]

## 2020-07-02 NOTE — PROGRESS NOTES
Per Poncho Velasco NP, this RN made Dr. Vedia Lundborg aware of pt's right lateral foot area that has had purulent discharge. Dr. Vedia Lundborg at bedside.

## 2020-07-03 VITALS
OXYGEN SATURATION: 94 % | TEMPERATURE: 97.8 F | BODY MASS INDEX: 42.2 KG/M2 | RESPIRATION RATE: 18 BRPM | HEART RATE: 82 BPM | HEIGHT: 64 IN | WEIGHT: 247.2 LBS | DIASTOLIC BLOOD PRESSURE: 71 MMHG | SYSTOLIC BLOOD PRESSURE: 102 MMHG

## 2020-07-03 LAB
ALBUMIN SERPL-MCNC: 4 G/DL (ref 3.4–5)
ANION GAP SERPL CALCULATED.3IONS-SCNC: 12 MMOL/L (ref 3–16)
BUN BLDV-MCNC: 69 MG/DL (ref 7–20)
CALCIUM SERPL-MCNC: 9.7 MG/DL (ref 8.3–10.6)
CHLORIDE BLD-SCNC: 93 MMOL/L (ref 99–110)
CO2: 31 MMOL/L (ref 21–32)
CREAT SERPL-MCNC: 2.1 MG/DL (ref 0.6–1.1)
GFR AFRICAN AMERICAN: 29
GFR NON-AFRICAN AMERICAN: 24
GLUCOSE BLD-MCNC: 102 MG/DL (ref 70–99)
GLUCOSE BLD-MCNC: 111 MG/DL (ref 70–99)
GLUCOSE BLD-MCNC: 98 MG/DL (ref 70–99)
PERFORMED ON: ABNORMAL
PERFORMED ON: NORMAL
PHOSPHORUS: 3.4 MG/DL (ref 2.5–4.9)
POTASSIUM SERPL-SCNC: 4.2 MMOL/L (ref 3.5–5.1)
SODIUM BLD-SCNC: 136 MMOL/L (ref 136–145)

## 2020-07-03 PROCEDURE — 80069 RENAL FUNCTION PANEL: CPT

## 2020-07-03 PROCEDURE — 2580000003 HC RX 258: Performed by: INTERNAL MEDICINE

## 2020-07-03 PROCEDURE — 94640 AIRWAY INHALATION TREATMENT: CPT

## 2020-07-03 PROCEDURE — 6360000002 HC RX W HCPCS: Performed by: INTERNAL MEDICINE

## 2020-07-03 PROCEDURE — 6370000000 HC RX 637 (ALT 250 FOR IP): Performed by: INTERNAL MEDICINE

## 2020-07-03 PROCEDURE — 36415 COLL VENOUS BLD VENIPUNCTURE: CPT

## 2020-07-03 PROCEDURE — 6370000000 HC RX 637 (ALT 250 FOR IP): Performed by: PEDIATRICS

## 2020-07-03 PROCEDURE — 99233 SBSQ HOSP IP/OBS HIGH 50: CPT | Performed by: INTERNAL MEDICINE

## 2020-07-03 RX ORDER — ALBUTEROL SULFATE 90 UG/1
2 AEROSOL, METERED RESPIRATORY (INHALATION) EVERY 6 HOURS PRN
Qty: 1 INHALER | Refills: 3 | Status: ON HOLD | OUTPATIENT
Start: 2020-07-03 | End: 2021-01-01

## 2020-07-03 RX ORDER — METOLAZONE 2.5 MG/1
TABLET ORAL
Qty: 30 TABLET | Refills: 2 | Status: SHIPPED | OUTPATIENT
Start: 2020-07-03 | End: 2020-07-17

## 2020-07-03 RX ADMIN — CLOPIDOGREL BISULFATE 75 MG: 75 TABLET ORAL at 08:58

## 2020-07-03 RX ADMIN — MIDODRINE HYDROCHLORIDE 7.5 MG: 5 TABLET ORAL at 08:58

## 2020-07-03 RX ADMIN — BUSPIRONE HYDROCHLORIDE 30 MG: 5 TABLET ORAL at 08:58

## 2020-07-03 RX ADMIN — MIDODRINE HYDROCHLORIDE 7.5 MG: 5 TABLET ORAL at 12:27

## 2020-07-03 RX ADMIN — TIOTROPIUM BROMIDE INHALATION SPRAY 2 PUFF: 3.12 SPRAY, METERED RESPIRATORY (INHALATION) at 08:11

## 2020-07-03 RX ADMIN — ARIPIPRAZOLE 10 MG: 10 TABLET ORAL at 08:59

## 2020-07-03 RX ADMIN — FUROSEMIDE 10 MG/HR: 10 INJECTION, SOLUTION INTRAMUSCULAR; INTRAVENOUS at 11:16

## 2020-07-03 RX ADMIN — BUPRENORPHINE AND NALOXONE 1 FILM: 8; 2 FILM BUCCAL; SUBLINGUAL at 08:59

## 2020-07-03 RX ADMIN — Medication 1 SQUIRT: at 09:00

## 2020-07-03 RX ADMIN — Medication 10 ML: at 08:59

## 2020-07-03 RX ADMIN — ASPIRIN 81 MG: 81 TABLET, COATED ORAL at 08:58

## 2020-07-03 RX ADMIN — LAMOTRIGINE 150 MG: 100 TABLET ORAL at 08:58

## 2020-07-03 RX ADMIN — PANTOPRAZOLE SODIUM 40 MG: 40 TABLET, DELAYED RELEASE ORAL at 08:59

## 2020-07-03 RX ADMIN — HEPARIN SODIUM 5000 UNITS: 5000 INJECTION INTRAVENOUS; SUBCUTANEOUS at 05:28

## 2020-07-03 RX ADMIN — FUROSEMIDE 10 MG/HR: 10 INJECTION, SOLUTION INTRAMUSCULAR; INTRAVENOUS at 00:19

## 2020-07-03 ASSESSMENT — PAIN SCALES - GENERAL
PAINLEVEL_OUTOF10: 0
PAINLEVEL_OUTOF10: 0

## 2020-07-03 NOTE — PROGRESS NOTES
Pt d/c'd home. Removed  IV and stopped bleeding. Catheter intact. Pt tolerated well. No redness noted at site. Notified [facility] and removed tele box. Reviewed d/c instructions, home meds, and  f/u information utilizing teach-back method. Scripts given to patient. Patient verbalized understanding. Wheeled down and driven home by .

## 2020-07-03 NOTE — PROGRESS NOTES
[facility]       Progress Note           Subjective/   62y.o. year old female who we are seeing in consultation for SHAUNNA. HPI:   Renal function improving with diuresis, good urine output with lasix drip  The patient wants to go home  Might still benefit from lasix drip, but if cardiology and primary team decide on discharge, may resume home diuretics      ROS:  SOB resolved, on room air  Still with edema  No new c/o    SOC: no visitors    Objective/   GEN:  Chronically ill, BP 98/68   Pulse 81   Temp 97.6 °F (36.4 °C) (Oral)   Resp 18   Ht 5' 4\" (1.626 m)   Wt 247 lb 3.2 oz (112.1 kg)   LMP 10/24/2012   SpO2 94%   BMI 42.43 kg/m²   HEENT: non-icteric, no JVD  CV: S1, S2 without m/r/g; ++ LE edema  RESP: CTA B without w/r/r; breathing wnl  ABD: +bs, soft, nt, no hsm  SKIN: warm, no rashes    Data/  Recent Labs     07/01/20  0508   WBC 6.2   HGB 10.8*   HCT 34.3*   MCV 76.5*        Recent Labs     07/01/20  0508 07/01/20  1517 07/02/20  0451 07/03/20  0449   * 130* 133* 136   K 4.0 4.6 4.8 4.2   CL 91* 90* 92* 93*   CO2 29 27 29 31   GLUCOSE 132* 165* 124* 111*   PHOS 4.3  --  3.9 3.4   MG 2.80*  --   --   --    BUN 97* 87* 83* 69*   CREATININE 2.2* 2.0* 2.0* 2.1*   LABGLOM 23* 26* 26* 24*   GFRAA 28* 31* 31* 29*       Assessment/     Acute Kidney Injury:  KDIGO stage 3/4  - Etiology:  Pre-renal due to decompensated heart failure. - Kidney function is stable     Heart Failure:  On room air.  Weight is down to 247 lbs.  Has LE edema.  History of EF = 30% with severe MR     Hyperkalemia:  Due to acute kidney injury.  Improving with diuresis.      Hyponatremia:  Related to heart failure and renal failure, exam and weight indicate hypervolemic    Plan/     - OK to stop lasix drip, as plans for discharge are made; resume home diuretics  - Low salt diet 2 grams/day & 1.5 liter/day fluid restriction

## 2020-07-03 NOTE — PLAN OF CARE
Problem: Falls - Risk of:  Goal: Will remain free from falls  Description: Will remain free from falls  Outcome: Ongoing  Goal: Absence of physical injury  Description: Absence of physical injury  Outcome: Ongoing     Problem: SAFETY  Goal: Free from accidental physical injury  Outcome: Ongoing  Goal: Free from intentional harm  Outcome: Ongoing     Problem: DAILY CARE  Goal: Daily care needs are met  Outcome: Ongoing     Problem: PAIN  Goal: Patient's pain/discomfort is manageable  Outcome: Ongoing     Problem: SKIN INTEGRITY  Goal: Skin integrity is maintained or improved  Outcome: Ongoing     Problem: DISCHARGE BARRIERS  Goal: Patient's continuum of care needs are met  Outcome: Ongoing     Problem: Pain:  Goal: Pain level will decrease  Description: Pain level will decrease  Outcome: Ongoing  Goal: Control of acute pain  Description: Control of acute pain  Outcome: Ongoing  Goal: Control of chronic pain  Description: Control of chronic pain  Outcome: Ongoing     Problem: OXYGENATION/RESPIRATORY FUNCTION  Goal: Patient will maintain patent airway  Outcome: Ongoing  Goal: Patient will achieve/maintain normal respiratory rate/effort  Description: Respiratory rate and effort will be within normal limits for the patient  Outcome: Ongoing     Problem: HEMODYNAMIC STATUS  Goal: Patient has stable vital signs and fluid balance  Outcome: Ongoing     Problem: FLUID AND ELECTROLYTE IMBALANCE  Goal: Fluid and electrolyte balance are achieved/maintained  Outcome: Ongoing     Problem: ACTIVITY INTOLERANCE/IMPAIRED MOBILITY  Goal: Mobility/activity is maintained at optimum level for patient  Outcome: Ongoing     Patient's EF (Ejection Fraction) is less than 40%    Heart Failure Medications:  Diuretics[de-identified] Furosemide    (One of the following REQUIRED for EF <40%/SYSTOLIC FAILURE but MAY be used in EF% >40%/DIASTOLIC FAILURE)        ACE[de-identified] None        ARB[de-identified] None         ARNI[de-identified] None    (Beta Blockers)  NON- Evidenced Based Beta Blocker (for EF% >40%/DIASTOLIC FAILURE): None    Evidenced Based Beta Blocker::(REQUIRED for EF% <40%/SYSTOLIC FAILURE) None  . .................................................................................................................................................. Patient's weights and intake/output reviewed: Yes    Patient's Last Weight: 249 lbs obtained by bed scale. Difference of 1 lb less than last documented weight. Intake/Output Summary (Last 24 hours) at 7/3/2020 0416  Last data filed at 7/3/2020 6972  Gross per 24 hour   Intake 1763 ml   Output 4200 ml   Net -2437 ml       Comorbidities Reviewed Yes    Patient has a past medical history of Acute kidney injury ([address] Acute on chronic congestive heart failure ([address] Alcohol dependence ([address] Cellulitis, Diabetic ulcer of left foot associated with type 2 diabetes mellitus ([address] Diabetic ulcer of toe of right foot associated with type 2 diabetes mellitus ([address] MI (myocardial infarction) ([address] and Positive FIT (fecal immunochemical test). >>For CHF and Comorbidity documentation on Education Time and Topics, please see Education Tab    Progressive Mobility Assessment:  What is this patient's Current Level of Mobility?: Ambulatory-Up Ad Jamsin  How was this patient Mobilized today?: Edge of Bed, Up to Chair,  Up to Toilet/Shower, Up in Room, and Up in Hallway                 With Whom? Nurse, PCA, and Self                 Level of Difficulty/Assistance: Independent     Pt resting in bed at this time on room air. Pt denies shortness of breath. Pt with pitting lower extremity edema.      Patient and/or Family's stated Goal of Care this Admission: reduce shortness of breath, increase activity tolerance, better understand heart failure and disease management, be more comfortable, and reduce lower extremity edema prior to discharge        :

## 2020-07-03 NOTE — CARE COORDINATION
Chart reviewed, no Podiatry nor IV antibiotic need. Discharge plan remains for pt to return home with family support.   KESHIA Low

## 2020-07-03 NOTE — PROGRESS NOTES
Hospitalist Progress Note      PCP: Jimmy Yoder PA-C    Date of Admission: 6/29/2020    Chief Complaint: FLAKITA GOMEZ [facility] Course:     Subjective: She is sitting up in bed, states she feels much better denies shortness of breath. Eating and drinking well asking if she can be discharged      Medications:  Reviewed    Infusion Medications    furosemide (LASIX) 1mg/ml infusion 10 mg/hr (07/03/20 1116)    dextrose       Scheduled Medications    zinc oxide  1 Squirt Topical Daily    aspirin EC  81 mg Oral Daily    atorvastatin  80 mg Oral Nightly    benztropine  1 mg Oral Nightly    ARIPiprazole  10 mg Oral Daily    buprenorphine-naloxone  1 Film Sublingual BID    clopidogrel  75 mg Oral Daily    busPIRone  30 mg Oral BID    lamoTRIgine  150 mg Oral BID    pantoprazole  40 mg Oral BID    tiotropium  2 puff Inhalation Daily    sodium chloride flush  10 mL Intravenous 2 times per day    heparin (porcine)  5,000 Units Subcutaneous 3 times per day    insulin lispro  0-6 Units Subcutaneous TID WC    insulin lispro  0-3 Units Subcutaneous Nightly    midodrine  7.5 mg Oral TID WC     PRN Meds: perflutren lipid microspheres, sodium chloride flush, acetaminophen **OR** acetaminophen, polyethylene glycol, promethazine **OR** ondansetron, glucose, dextrose, glucagon (rDNA), dextrose      Intake/Output Summary (Last 24 hours) at 7/3/2020 1222  Last data filed at 7/3/2020 0531  Gross per 24 hour   Intake 1403 ml   Output 3900 ml   Net -2497 ml       Physical Exam Performed:    /71   Pulse 82   Temp 97.8 °F (36.6 °C) (Oral)   Resp 18   Ht 5' 4\" (1.626 m)   Wt 247 lb 3.2 oz (112.1 kg)   LMP 10/24/2012   SpO2 94%   BMI 42.43 kg/m²     General appearance: Sitting up in bed in no apparent distress, but and cooperative. HEENT: Pupils equal, round, and reactive to light. Conjunctivae/corneas clear. Neck: Supple, with full range of motion. No jugular venous distention.  Trachea midline. Respiratory:  Normal respiratory effort. Clear to auscultation, bilaterally without Rales/Wheezes/Rhonchi. Cardiovascular: Regular rate and rhythm with normal S1/S2   Abdomen: Soft, non-tender, non-distended with normal bowel sounds. Musculoskeletal: No clubbing, cyanosis or edema bilaterally. Full range of motion without deformity. Skin: Chronic leg wounds on both lower extremities no discharge noted this time  Neurologic:  Neurovascularly intact without any focal sensory/motor deficits. Cranial nerves: II-XII intact, grossly non-focal.  Psychiatric: Alert and oriented, thought content appropriate, normal insight  Capillary Refill: Brisk,< 3 seconds   Peripheral Pulses: +2 palpable, equal bilaterally       Labs:   Recent Labs     07/01/20  0508   WBC 6.2   HGB 10.8*   HCT 34.3*        Recent Labs     07/01/20  0508 07/01/20  1517 07/02/20  0451 07/03/20  0449   * 130* 133* 136   K 4.0 4.6 4.8 4.2   CL 91* 90* 92* 93*   CO2 29 27 29 31   BUN 97* 87* 83* 69*   CREATININE 2.2* 2.0* 2.0* 2.1*   CALCIUM 9.6 9.7 9.7 9.7   PHOS 4.3  --  3.9 3.4     No results for input(s): AST, ALT, BILIDIR, BILITOT, ALKPHOS in the last 72 hours. No results for input(s): INR in the last 72 hours. Recent Labs     07/01/20  0831   TROPONINI 0.02*       Urinalysis:      Lab Results   Component Value Date    NITRU Negative 06/29/2020    WBCUA 3-5 09/28/2019    BACTERIA Rare 09/28/2019    RBCUA None seen 09/28/2019    BLOODU Negative 06/29/2020    SPECGRAV 1.015 06/29/2020    GLUCOSEU Negative 06/29/2020       Radiology:  XR FOOT RIGHT (MIN 3 VIEWS)   Final Result   Soft tissue swelling without acute bony osseous abnormality. No cortical   destruction. RECOMMENDATION:   Consider radionuclide bone imaging. XR CHEST PORTABLE   Final Result   Negative portable study.                  Assessment/Plan:    Active Hospital Problems    Diagnosis    Peripheral venous insufficiency [I87.2]    Venous stasis ulcer of right calf with fat layer exposed without varicose veins (HCC) [I87.2, L97.212]    Venous stasis ulcer of left calf with fat layer exposed without varicose veins (HCC) [I87.2, L97.222]    Diabetic polyneuropathy associated with type 2 diabetes mellitus ([facility] [E11.42]    Acute on chronic renal insufficiency [N28.9, N18.9]    Acute on chronic renal failure (HCC) [N17.9, N18.9]    Hyperkalemia [E87.5]    Acute renal failure superimposed on chronic kidney disease, on chronic dialysis ([facility] [N17.9, N18.9, Z99.2]    Hypotension - chronic [I95.9]    Acute on chronic systolic congestive heart failure (HCC) [I50.23]    Peripheral arterial disease (HCC) [I73.9]     Acute renal failure on chronic kidney disease stage III: likely due to  Cardiorenal syndrome. Nephro assisting with management and greatly appreciated. Continue lasix gtt. Monitor and avoid nephrotoxins.      Hyperkalemia:  Received appropriate treatment in the emergency department. No EKG findings. Stopped potassium supplementation. Monitor electrolytes  Keep on telemetry     Hyponatremia: likely hypovolemic due to CHF. Monitoring and now improved    Acute on chronic systolic CHF : Ef 17%. Cardio recs appreciated. Starting lasix gtt. Echo with EF 25%, apical hypokinesis. cardio with plans for possible cath at some point when renal function stable.      Diabetes mellitus type 2 with hypoglycemia: Scheduled preprandial insulin as well as Lantus are on hold. No repeat hypoglycemia noted overnight. Monitor and continue SSI and hypoglycemia protocol      Chronic leg wounds: follows at wound care clinic  with no overt signs of infection currently. Wound care consulted. Podiatry consulted for chronic right foot wound. Podiatry input is noted and appreciated, no further work-up needed at this time and to follow-up outpatient with vascular surgery    DVT Prophylaxis: heparin  Diet: DIET CARDIAC; Low Sodium (2 GM);  Daily Fluid Restriction: 1800

## 2020-07-03 NOTE — PROGRESS NOTES
Danyel   Daily Cardiovascular Progress Note    Admit Date: 6/29/2020    Chief complaint: SOB  HPI: improved      Medications/Labs all Reviewed:  Patient Active Problem List   Diagnosis    Type 2 diabetes mellitus with diabetic polyneuropathy, with long-term current use of insulin ([facility])    Essential hypertension    CLINT (obstructive sleep apnea)    Tobacco abuse disorder    Peripheral arterial disease (HCC)    GERD (gastroesophageal reflux disease)    Anxiety and depression    Hyponatremia    Iron deficiency anemia    CAD (coronary artery disease)    Mitral valve regurgitation    Stage 3 chronic kidney disease (HCC)    Claudication in peripheral vascular disease ([facility])    COPD (chronic obstructive pulmonary disease) ([facility])    Vitamin D deficiency    Acute on chronic systolic congestive heart failure (HCC)    Dyslipidemia    Abel's esophagus    Viral hepatitis C    Pulmonary nodule    Class 2 severe obesity due to excess calories with serious comorbidity and body mass index (BMI) of 39.0 to 39.9 in adult ([facility])    Bipolar disorder ([facility])    Hypotension - chronic    Pulmonary hypertension (HCC)    Bilateral lower extremity edema    Stasis dermatitis of both legs    Habitual self-excoriation    Ulcer of left lower leg, limited to breakdown of skin (HCC)    Ulcer of right leg, with fat layer exposed ([facility])    Arthritis    Cardiomyopathy ([facility])    Chronic systolic heart failure ([facility])    Acute on chronic renal failure (HCC)    Hyperkalemia    Acute renal failure superimposed on chronic kidney disease, on chronic dialysis ([facility])    Acute on chronic renal insufficiency    Peripheral venous insufficiency    Venous stasis ulcer of right calf with fat layer exposed without varicose veins (HCC)    Venous stasis ulcer of left calf with fat layer exposed without varicose veins (HCC)    Diabetic polyneuropathy associated with type 2 diabetes mellitus ([facility]ison) Medications:  furosemide (LASIX) 100 mg in dextrose 5 % 100 mL infusion, Continuous  perflutren lipid microspheres (DEFINITY) injection 1.65 mg, ONCE PRN  zinc oxide (TRIAD HYDROPHILIC) paste 1 Squirt, Daily  aspirin EC tablet 81 mg, Daily  atorvastatin (LIPITOR) tablet 80 mg, Nightly  benztropine (COGENTIN) tablet 1 mg, Nightly  ARIPiprazole (ABILIFY) tablet 10 mg, Daily  buprenorphine-naloxone (SUBOXONE) 8-2 MG SL film 1 Film, BID  clopidogrel (PLAVIX) tablet 75 mg, Daily  busPIRone (BUSPAR) tablet 30 mg, BID  lamoTRIgine (LAMICTAL) tablet 150 mg, BID  pantoprazole (PROTONIX) tablet 40 mg, BID  tiotropium (SPIRIVA RESPIMAT) 2.5 MCG/ACT inhaler 2 puff, Daily  sodium chloride flush 0.9 % injection 10 mL, 2 times per day  sodium chloride flush 0.9 % injection 10 mL, PRN  acetaminophen (TYLENOL) tablet 650 mg, Q6H PRN    Or  acetaminophen (TYLENOL) suppository 650 mg, Q6H PRN  polyethylene glycol (GLYCOLAX) packet 17 g, Daily PRN  promethazine (PHENERGAN) tablet 12.5 mg, Q6H PRN    Or  ondansetron (ZOFRAN) injection 4 mg, Q6H PRN  heparin (porcine) injection 5,000 Units, 3 times per day  glucose (GLUTOSE) 40 % oral gel 15 g, PRN  dextrose 50 % IV solution, PRN  glucagon (rDNA) injection 1 mg, PRN  dextrose 5 % solution, PRN  insulin lispro (HUMALOG) injection vial 0-6 Units, TID WC  insulin lispro (HUMALOG) injection vial 0-3 Units, Nightly  midodrine (PROAMATINE) tablet 7.5 mg, TID WC           PHYSICAL EXAM   BP 98/68   Pulse 81   Temp 97.6 °F (36.4 °C) (Oral)   Resp 18   Ht 5' 4\" (1.626 m)   Wt 247 lb 3.2 oz (112.1 kg)   LMP 10/24/2012   SpO2 94%   BMI 42.43 kg/m²    Vitals:    07/02/20 1956 07/02/20 2300 07/03/20 0323 07/03/20 0531   BP: 108/75 99/68 98/68    Pulse: 86 91 81    Resp: 18 16 18    Temp: 97.1 °F (36.2 °C) 97.4 °F (36.3 °C) 97.6 °F (36.4 °C)    TempSrc: Oral Oral Oral    SpO2: 94% 93% 94%    Weight:    247 lb 3.2 oz (112.1 kg)   Height:             Intake/Output Summary (Last 24 hours) 10/2019   - Hx of PCi to RCA and LCx  4. Mitral regurg:              - moderate  5. Pulmonary Hypertension:  6. Acute on CKD  7. PAD:               - SFA disease              - left subclavian bypass   Rt fem-pop bypass  8. A/CKD: stable      PLAN  1. Fluid overloaded on CardioMems  2. Cont diuresis as tolerated  3. Also PT is likely incompletely revascularized, CABG was single vessel (+RCA disease on my review of images) due to multiple peripheral  vascular disease and both SVG used for PAD bypass. - Doubt that LIMA is able to provide flow to LCx via a diseased ostial LAD/left main   - Stress test high likely aprnell of false negative   - Will need cardiac cath once able--> careful consideration for FAMILIA given A/CKD  4. COnt ASA, lipitor, plavix,    - HOLDING ACE/ARB/ARNI for SHAUNNA   - holding BB for hypotension      Pt asking about going home, if able to ambulate w/o SOB, tachycardia and able to lay elvin- then ok from my standpoint and can consider outpt cath. Suspect more fluid hiding in Abd. But also pending Nephrology opinion.      Patient Active Problem List   Diagnosis    Type 2 diabetes mellitus with diabetic polyneuropathy, with long-term current use of insulin ([address]    Essential hypertension    CLINT (obstructive sleep apnea)    Tobacco abuse disorder    Peripheral arterial disease (HCC)    GERD (gastroesophageal reflux disease)    Anxiety and depression    Hyponatremia    Iron deficiency anemia    CAD (coronary artery disease)    Mitral valve regurgitation    Stage 3 chronic kidney disease (HCC)    Claudication in peripheral vascular disease ([address]    COPD (chronic obstructive pulmonary disease) ([address]    Vitamin D deficiency    Acute on chronic systolic congestive heart failure (HCC)    Dyslipidemia    Abel's esophagus    Viral hepatitis C    Pulmonary nodule    Class 2 severe obesity due to excess calories with serious comorbidity and body mass index (BMI) of 39.0 to 39.9 in adult [facility])  Bipolar disorder ([address]    Hypotension - chronic    Pulmonary hypertension (HCC)    Bilateral lower extremity edema    Stasis dermatitis of both legs    Habitual self-excoriation    Ulcer of left lower leg, limited to breakdown of skin (HCC)    Ulcer of right leg, with fat layer exposed ([address]    Arthritis    Cardiomyopathy (HCC)    Chronic systolic heart failure (HCC)    Acute on chronic renal failure (HCC)    Hyperkalemia    Acute renal failure superimposed on chronic kidney disease, on chronic dialysis ([address]    Acute on chronic renal insufficiency    Peripheral venous insufficiency    Venous stasis ulcer of right calf with fat layer exposed without varicose veins (HCC)    Venous stasis ulcer of left calf with fat layer exposed without varicose veins (HCC)    Diabetic polyneuropathy associated with type 2 diabetes mellitus ([address]         Thank you for allowing me to participate in the care of your patient. Please call me with any questions [phone].       Roberto Hoover MD, [address] [facility] Cardiologist  [facility]  [phone] [facility]  [phone] [address] [place] [address]  7/3/2020 7:34 AM  \

## 2020-07-04 ENCOUNTER — FOLLOWUP TELEPHONE ENCOUNTER (OUTPATIENT)
Dept: ADMINISTRATIVE | Age: 55
End: 2020-07-04

## 2020-07-05 NOTE — DISCHARGE SUMMARY
Hospital Medicine Discharge Summary    Patient ID: Bety Brewster      Patient's PCP: Patirce Leavitt PA-C    Admit Date: 6/29/2020     Discharge Date: 7/3/2020     Admitting Physician: Alonzo Marsh MD     Discharge Physician: Clarissa Duarte MD     Discharge Diagnoses: Active Hospital Problems    Diagnosis    Peripheral venous insufficiency [I87.2]    Venous stasis ulcer of right calf with fat layer exposed without varicose veins (HCC) [I87.2, L97.212]    Venous stasis ulcer of left calf with fat layer exposed without varicose veins (HCC) [I87.2, L97.222]    Diabetic polyneuropathy associated with type 2 diabetes mellitus ([address] [E11.42]    Acute on chronic renal insufficiency [N28.9, N18.9]    Acute on chronic renal failure (HCC) [N17.9, N18.9]    Hyperkalemia [E87.5]    Acute renal failure superimposed on chronic kidney disease, on chronic dialysis ([address] [N17.9, N18.9, Z99.2]    Hypotension - chronic [I95.9]    Acute on chronic systolic congestive heart failure (HCC) [I50.23]    Peripheral arterial disease ([address] [I73.9]       The patient was seen and examined on day of discharge and this discharge summary is in conjunction with any daily progress note from day of discharge. Hospital Course:     61 y/o with cardiorenal syndrome sent into the hospital for abnormal labs. She has a past medical history congestive heart failure ejection fraction about 30% with severe MR. Also history of chronic kidney disease. Recently she has been having increased weight. Has significant LE edema. She was noted to have renal failure complicated by hyperkalemia and hyponatremia. She was admitted for further evaluation and was seen by cardiology and nephrology in consultation. She did receive diuresis, she was on a Lasix drip, she diuresed quite well renal function improved and her physical condition improved.   She was able to ambulate without any significant shortness of breath and was stable for discharge with continued outpatient follow-up. Course per issues        Acute renal failure on chronic kidney disease stage III: likely due to  Cardiorenal syndrome. Nephro assisted with management and greatly appreciated. she was on a lasix gtt bonded well with good diuresis. She will do more oral diuretics as noted. And follow-up with renal and cardiology as an outpatient.     Hyperkalemia:  Received appropriate treatment in the emergency department. No EKG findings. Stopped potassium supplementation.  Monitor electrolytes time of discharge this was stable     Hyponatremia: likely hypovolemic due to CHF. Monitored and now improved     Acute on chronic systolic CHF : Ef 53%. Cardio recs appreciated. Did receive Lasix gtt good diuresis.   Echo with EF 25%, apical hypokinesis. cardio with plans for possible cath at some point when renal function stable. She was stable time of discharge, volume status was stable and she is to follow volume status at home with daily weights and limiting fluid intake. We will continue diuretics as noted. Follow-up with cardiology and nephrology as an outpatient     Diabetes mellitus type 2 with hypoglycemia: . No repeat hypoglycemia noted overnight. Monitored and continued SSI and hypoglycemia protocol. Stable time of discharge and she will follow her blood sugars at home and follow-up with PCP     Chronic leg wounds: follows at wound care clinic  with no overt signs of infection currently. Wound care consulted. Podiatry consulted for chronic right foot wound.   Podiatry input is noted and appreciated, no further work-up needed at this time and to follow-up outpatient with vascular surgery       Physical Exam Performed:     /71   Pulse 82   Temp 97.8 °F (36.6 °C) (Oral)   Resp 18   Ht 5' 4\" (1.626 m)   Wt 247 lb 3.2 oz (112.1 kg)   LMP 10/24/2012   SpO2 94%   BMI 42.43 kg/m²     General appearance: Sitting up in bed in no apparent distress,  alert and cardiology within next 1 to 2 weeks follow-up with nephrology within next 1 to 2 weeks and with PCP within the next 2 to 3 weeks    Code Status:  Prior     Activity: activity as tolerated    Diet: cardiac diet      Discharge Medications:     Discharge Medication List as of 7/3/2020  4:00 PM           Details   albuterol sulfate  (90 Base) MCG/ACT inhaler Inhale 2 puffs into the lungs every 6 hours as needed for Wheezing or Shortness of Breath, Disp-1 Inhaler, R-3Print      metOLazone (ZAROXOLYN) 2.5 MG tablet Take every Monday, Wednesday, Friday, Disp-30 tablet, R-2Normal              Details   midodrine (PROAMATINE) 5 MG tablet Take 1.5 tablets by mouth 3 times daily, Disp-90 tablet, R-3Normal      torsemide (DEMADEX) 100 MG tablet Take 1 tablet by mouth daily 100 mg daily except 50 mg twice weekly (Monday and Thursday). Historical Med      Insulin Degludec (TRESIBA FLEXTOUCH) 100 UNIT/ML SOPN Inject 30 Units into the skin nightly, Disp-10 pen, R-5Normal      insulin lispro, 1 Unit Dial, 100 UNIT/ML SOPN Inject 10 Units into the skin 3 times daily, Disp-3 pen, R-2Normal      potassium chloride (KLOR-CON M) 20 MEQ extended release tablet Take 2 tablets by mouth 4 times daily, Disp-240 tablet, R-3Normal      spironolactone (ALDACTONE) 25 MG tablet Take 1 tablet by mouth daily, Disp-30 tablet, R-3Normal      atorvastatin (LIPITOR) 80 MG tablet Take 1 tablet by mouth nightly, Disp-90 tablet, R-3Normal      tiotropium (SPIRIVA RESPIMAT) 2.5 MCG/ACT AERS inhaler INHALE 2 PUFFS INTO THE LUNGS DAILY, Disp-1 Inhaler, R-6Normal      buprenorphine-naloxone (SUBOXONE) 8-2 MG FILM SL film Place 1 Film under the tongue 2 times daily. Historical Med      benztropine (COGENTIN) 1 MG tablet Take 1 mg by mouth nightly Historical Med      blood glucose test strips (EXACTECH TEST) strip Disp-200 strip, R-6, Normal6 times daily.       Insulin Pen Needle (B-D ULTRAFINE III SHORT PEN) 31G X 8 MM MISC Disp-200 each, R-2, Enigmediave shots a day      INSULIN SYRINGE .5CC/29G 29G X 1/2\" 0.5 ML MISC DAILY Starting Tue 12/3/2019, Disp-100 each, R-3, Normal      Liraglutide (VICTOZA) 18 MG/3ML SOPN SC injection Inject 1.2 mg into the skin daily, Disp-2 pen, R-3Normal      pantoprazole (PROTONIX) 40 MG tablet Take 1 tablet by mouth 2 times daily, Disp-60 tablet, R-0NO PRINT      clopidogrel (PLAVIX) 75 MG tablet TAKE 1 TABLET BY MOUTH EVERY DAY, Disp-30 tablet, R-5Normal      magnesium oxide (MAG-OX) 400 (240 Mg) MG tablet TAKE 1 TABLET ONCE DAILY, Disp-30 tablet, R-11Normal      busPIRone (BUSPAR) 30 MG tablet Take 30 mg by mouth 2 times daily Historical Med      aspirin EC 81 MG EC tablet Take 1 tablet by mouth daily, Disp-30 tablet, R-3      ARIPiprazole (ABILIFY) 10 MG tablet Take 10 mg by mouth daily Historical Med      lamoTRIgine (LAMICTAL) 150 MG tablet Take 150 mg by mouth 2 times daily Historical Med             Time Spent on discharge is more than 45 minutes in the examination, evaluation, counseling and review of medications and discharge plan. Signed:    Tyler Camejo MD   7/5/2020      Thank you Jose A Arndt PA-C for the opportunity to be involved in this patient's care. If you have any questions or concerns please feel free to contact me at [phone].

## 2020-07-06 NOTE — ADT AUTH CERT
to decompensated heart failure. - Data: Rick Amezcua is up, she has edema, cardiomems readings showed higher PA diastolic.   - Kidney function is improving with trial of diuretic.       Heart Failure:  On room air.  Weight is up to 249 lbs.  Has LE edema.  History of EF = 30% with severe MR       Hyperkalemia:  Due to acute kidney injury. Improving with diuresis.         Hyponatremia:  Related to heart failure and renal failure, exam and weight indicate hypervolemic       Plan/       - Agree with trial of lasix drip - increase up to 10 mg/hour   - Continue midodrine 7.5 mg po tid   - Low salt diet 2 grams/day & 1.5 liter/day fluid restriction   - Trend labs      podiatry note   Assessment:       1. Venous insufficiency, b/l   2. Ulcers to subQ b/l legs   3. DM2 with peripheral neuropathy   4. PAD with ulcerations       Plan:        - Patient examined and evaluated.    - Patient's ulcerations appear clinically stable. No lab work to suggest deeper infection at this time.               - No osseous destruction on x-ray around 5th met head right foot.               - No leukocytosis.             - I do not believe nuclear medicine scan will be needed at this time.    - Agree with wound care assessment that patient will need compression dressings for venous insufficiency from toes to the knees. Wear at all times, unless resting with legs elevated.    - No ABX needed for the foot at this time.    - Patient is stable for discharge from a podiatry standpoint at this time.               - Recommend close follow up with the wound care center and Dr. Tiana Gonzales. She will need intervention on the right leg if medically stable to undergo intervention.    - Patient is stable from a podiatry standpoint for discharge. *IM NOTE       Acute renal failure on chronic kidney disease stage III: likely due to  Cardiorenal syndrome. Nephro assisting. Starting lasix gtt.  Monitor and avoid nephrotoxins.        Hyperkalemia:  Received appropriate treatment in the emergency department. No EKG findings. Stopped potassium supplementation.  Monitor electrolytes   Keep on telemetry       Hyponatremia: likely hypovolemic due to CHF. Mgt per nephro. Monitor        Acute on chronic systolic CHF : Ef 29%. Cardio recs appreciated. Starting lasix gtt. Echo with EF 25%, apical hypokinesis. cardio with plans for possible cath at some point when renal function stable.        Diabetes mellitus type 2 with hypoglycemia: Scheduled preprandial insulin as well as Lantus are on hold. No repeat hypoglycemia noted overnight. Monitor and continue SSI and hypoglycemia protocol        Chronic leg wounds: follows at wound care clinic  with no overt signs of infection currently. Wound care consulted. Podiatry consulted for chronic right foot wound. Unable to get nuclear scan of foot. Defer additional testing to podiatry.               DVT Prophylaxis: Subcutaneous heparin   Diet: DIET CARDIAC; Low Sodium (2 GM); Daily Fluid Restriction: 1800 ml   Code Status: Full Code       PT/OT Eval Status: Requested       Dispo - hard to say, pending clinical course            Renal Failure, Acute - Care Day 3 (7/1/2020) by Joon Echavarria RN         Review Status Review Entered   Completed 7/6/2020 10:44       Criteria Review      Care Day: 3 Care Date: 7/1/2020 Level of Care:    Guideline Day 2    Level Of Care    (X) Floor    Clinical Status    ( ) * Electrolyte abnormalities absent or improved    7/6/2020 10:42 AM EDT by Angel Jones      sodium 132 magnesium 2.80    (X) * Acid-base abnormalities absent or improved    7/6/2020 10:42 AM EDT by Angel Jones      improved.  creatinin 2.2 today    (X) * Hypotension absent    7/6/2020 10:42 AM EDT by Angel Jones      BP 93/57    Routes    (X) Parenteral or oral hydration    7/6/2020 10:42 AM EDT by Angel Jones      PO hydration    (X) Parenteral or oral medications    7/6/2020 10:44 AM EDT by Kaylin Flores Cynthia Marcos      lasix 40mg IV twice daily    (X) Renal diet as tolerated    Interventions    (X) Monitor electrolytes, renal function tests, acid-base, and volume status    * Milestone   Additional Notes   07/01/2020 care day 3   VS T 98 P 77 R 18 BP 93/57 spo2 96% RA      Labs   Sodium: 132 (L)   Chloride: 91 (L)   BUN: 97 (HH)   Creatinine: 2.2 (H)   GFR Non-: 23 (A)   GFR : 28 (A)   Magnesium: 2.80 (H)   Glucose: 132 (H)   Hemoglobin Quant: 10.8 (L)   Hematocrit: 34.3 (L)   MCV: 76.5 (L)   MCH: 24.0 (L)   MCHC: 31.4   RDW: 19.6 (H)         Meds: abilify 10mg po daily, aspirin 81mg po daily, lipitor 80mg po nightly, suboxeon 8-2mg SL twice daily, buspar 30mg po twice daily, plavix 75mg po daily, lasix 40mg IV twice daily, heparin 5000 units subq three times daily, insulin lispro ss subq four times daily, lamictal 150mg po twice daily, proamatine 7.5mg PO three times daily, protonix 40mg po twice daily, tylenol 650mg po prn x1, phenergan 12.5mg po prn x1,       IM NOTE       Acute renal failure on chronic kidney disease stage III: likely due to  Cardiorenal syndrome. Nephro assisting. Received IV lasix X1. Continue diuresis as needed per nephro. Monitor and avoid nephrotoxins.        Hyperkalemia:  Received appropriate treatment in the emergency department. No EKG findings. Stopped potassium supplementation.  Monitor electrolytes   Keep on telemetry       Hyponatremia: likely hypovolemic due to CHF. Mgt per nephro. Monitor            Acute on chronic systolic CHF : Ef 10%. Cardio recs appreciated. Diuresis held today. Repeat echo pending. cardio with plans for possible cath at some point when renal function stable.                Diabetes mellitus type 2 with hypoglycemia: Scheduled preprandial insulin as well as Lantus are on hold. No repeat hypoglycemia noted overnight.  Monitor and continue SSI and hypoglycemia protocol        Chronic leg wounds: follows at wound care clinic  with no overt signs of infection currently. Wound care consulted                DVT Prophylaxis: Subcutaneous heparin   Diet: DIET CARDIAC; Low Sodium (2 GM); Daily Fluid Restriction: 1500 ml   Code Status: Full Code       PT/OT Eval Status: Requested       Nephrology note   Assessment:       Acute Kidney Injury:  KDIGO stage 3   - Etiology:  Pre-renal due to decompensated heart failure. - Data: Erika White is up, she has edema, cardiomems readings showed higher PA diastolic.   - Kidney function is improving with trial of diuretic.       Heart Failure:  On room air.  Weight is up to 247 lbs.  Has LE edema.  History of EF = 30% with severe MR       Hyperkalemia:  Due to acute kidney injury. Improving with diuresis.        Hyponatremia:  Related to heart failure and renal failure, exam and weight indicate hypervolemic           Plan:       - Lasix 40mg IV 3x/day. -> will hold today per cardiology's request. But likely will need to be restarted in am given significant signs of fluid overload and improving SHAUNNA from cardiorenal syndrome.   - Continue Midodrine for BP support.    - Monitor Is and Os closely. - Trend labs. - Fluid and sodium restriction.   - Daily weights. cardiology note       Assessment and Plan       1. Acute on chronic systolic CHF:    2. Ischemic cardiomyopathy: LVEF 25% last TTE                - CardioMEMS implanted 2/2019, Baseline PAD is 25mmHg               - recent Cardiomems showing 64/29 mmHg               - Wt 241lbs on 1/21/20 visit, now 247               -6.26L since admission, -5.4L overnight   3. CAD               - s/p CABG 1/27/2020 (LIMA-LAD)               - 10/2019               - Hx of PCi to RCA and LCx   4. Mitral regurg:               - moderate to severe   5. Pulmonary Hypertension:   6. Acute on CKD   7. PAD:                - SFA disease               - left subclavian bypass               Rt fem-pop bypass   8.  A/CKD: improving           PLAN    1. Pt appears to have mild extravascular and euvolemic intravascular by exam. Suspect that Fluid shifts btwn vascular spaces and low intravascular volume led to SHAUNNA, hypotension               - Very gentle diuresis suggested if any at all today, WOULD HOLD until further testing               - will recheck CardioMems pressures this AM               - Recheck Echo for MR, LVEF< fluid status   2. Will get limited Echo for LVEF and MR                -  ifLVEF remains reduced will consider BiV-ACID to help RV-LV sync. 3. Also PT is likely incompletely revascularized, CABG was single vessel (+RCA disease on my review of images) due to multiple peripheral  vascular disease and both SVG used for PAD bypass.                - Doubt that LIMA is able to provide flow to LCx via a diseased ostial LAD/left main               - Stress test high likely parnell of false negative               - Will need cardiac cath once able--> careful consideration for FAMILIA given A/CKD   4. Repeat trop for trend   5. COnt ASA< lipitor, plavix,                - HOLDING ACE/ARB. ARNI for SHAUNNA               - holding BB for hypotension

## 2020-07-07 ENCOUNTER — HOSPITAL ENCOUNTER (OUTPATIENT)
Dept: CARDIAC CATH/INVASIVE PROCEDURES | Age: 55
Discharge: HOME | End: 2020-07-07
Attending: SURGERY | Admitting: SURGERY
Payer: COMMERCIAL

## 2020-07-07 VITALS — HEIGHT: 65 IN | WEIGHT: 248 LBS | BODY MASS INDEX: 41.32 KG/M2

## 2020-07-07 LAB
ANION GAP SERPL CALCULATED.3IONS-SCNC: 12 MMOL/L (ref 3–16)
BUN BLDV-MCNC: 54 MG/DL (ref 7–20)
CALCIUM SERPL-MCNC: 9.4 MG/DL (ref 8.3–10.6)
CHLORIDE BLD-SCNC: 90 MMOL/L (ref 99–110)
CO2: 31 MMOL/L (ref 21–32)
CREAT SERPL-MCNC: 1.6 MG/DL (ref 0.6–1.1)
GFR AFRICAN AMERICAN: 40
GFR NON-AFRICAN AMERICAN: 33
GLUCOSE BLD-MCNC: 80 MG/DL (ref 70–99)
HCT VFR BLD CALC: 35.3 % (ref 36–48)
HEMOGLOBIN: 11.2 G/DL (ref 12–16)
INR BLD: 1.19 (ref 0.86–1.14)
MCH RBC QN AUTO: 24.7 PG (ref 26–34)
MCHC RBC AUTO-ENTMCNC: 31.7 G/DL (ref 31–36)
MCV RBC AUTO: 77.9 FL (ref 80–100)
PDW BLD-RTO: 20.6 % (ref 12.4–15.4)
PLATELET # BLD: 344 K/UL (ref 135–450)
PMV BLD AUTO: 9 FL (ref 5–10.5)
POTASSIUM SERPL-SCNC: 4.8 MMOL/L (ref 3.5–5.1)
PROTHROMBIN TIME: 13.8 SEC (ref 10–13.2)
RBC # BLD: 4.53 M/UL (ref 4–5.2)
SODIUM BLD-SCNC: 133 MMOL/L (ref 136–145)
WBC # BLD: 9 K/UL (ref 4–11)

## 2020-07-07 PROCEDURE — C1887 CATHETER, GUIDING: HCPCS

## 2020-07-07 PROCEDURE — 75625 CONTRAST EXAM ABDOMINL AORTA: CPT | Performed by: SURGERY

## 2020-07-07 PROCEDURE — 85027 COMPLETE CBC AUTOMATED: CPT

## 2020-07-07 PROCEDURE — 76937 US GUIDE VASCULAR ACCESS: CPT | Performed by: SURGERY

## 2020-07-07 PROCEDURE — 75716 ARTERY X-RAYS ARMS/LEGS: CPT | Performed by: SURGERY

## 2020-07-07 PROCEDURE — 75716 ARTERY X-RAYS ARMS/LEGS: CPT

## 2020-07-07 PROCEDURE — 2580000003 HC RX 258

## 2020-07-07 PROCEDURE — 99152 MOD SED SAME PHYS/QHP 5/>YRS: CPT

## 2020-07-07 PROCEDURE — 6360000002 HC RX W HCPCS

## 2020-07-07 PROCEDURE — 99152 MOD SED SAME PHYS/QHP 5/>YRS: CPT | Performed by: SURGERY

## 2020-07-07 PROCEDURE — 85610 PROTHROMBIN TIME: CPT

## 2020-07-07 PROCEDURE — 75625 CONTRAST EXAM ABDOMINL AORTA: CPT

## 2020-07-07 PROCEDURE — 2709999900 HC NON-CHARGEABLE SUPPLY

## 2020-07-07 PROCEDURE — 6360000004 HC RX CONTRAST MEDICATION

## 2020-07-07 PROCEDURE — 80048 BASIC METABOLIC PNL TOTAL CA: CPT

## 2020-07-07 PROCEDURE — C1760 CLOSURE DEV, VASC: HCPCS

## 2020-07-07 PROCEDURE — 36247 INS CATH ABD/L-EXT ART 3RD: CPT | Performed by: SURGERY

## 2020-07-07 PROCEDURE — C1769 GUIDE WIRE: HCPCS

## 2020-07-07 PROCEDURE — 99153 MOD SED SAME PHYS/QHP EA: CPT

## 2020-07-07 PROCEDURE — 36247 INS CATH ABD/L-EXT ART 3RD: CPT

## 2020-07-07 PROCEDURE — 2500000003 HC RX 250 WO HCPCS

## 2020-07-07 RX ORDER — MIDAZOLAM HYDROCHLORIDE 5 MG/ML
INJECTION INTRAMUSCULAR; INTRAVENOUS
Status: COMPLETED | OUTPATIENT
Start: 2020-07-07 | End: 2020-07-07

## 2020-07-07 RX ORDER — FENTANYL CITRATE 50 UG/ML
INJECTION, SOLUTION INTRAMUSCULAR; INTRAVENOUS
Status: COMPLETED | OUTPATIENT
Start: 2020-07-07 | End: 2020-07-07

## 2020-07-07 RX ORDER — SODIUM CHLORIDE 9 MG/ML
INJECTION, SOLUTION INTRAVENOUS CONTINUOUS
Status: DISCONTINUED | OUTPATIENT
Start: 2020-07-07 | End: 2020-07-07 | Stop reason: HOSPADM

## 2020-07-07 RX ADMIN — FENTANYL CITRATE 50 MCG: 50 INJECTION, SOLUTION INTRAMUSCULAR; INTRAVENOUS at 08:01

## 2020-07-07 RX ADMIN — MIDAZOLAM HYDROCHLORIDE 1 MG: 5 INJECTION INTRAMUSCULAR; INTRAVENOUS at 08:01

## 2020-07-07 RX ADMIN — FENTANYL CITRATE 50 MCG: 50 INJECTION, SOLUTION INTRAMUSCULAR; INTRAVENOUS at 08:25

## 2020-07-07 NOTE — H&P
History and Physical / Pre-Sedation Assessment    Patient:  Mayo Horne  :   1965    Intended Procedure: Stalin LE angio, possible RLE intervention      HPI: Chronic bilateral leg wounds. S/P RLE bypass and LLE PTA/stenting. Graft duplex showing low flow in RLE graft suggestive of impending failure. Nurses notes reviewed and agreed. Medications reviewed  Allergies:   Lisinopril        Physical Exam:   CURRENT VITALS:  Ht 5' 4.5\" (1.638 m)   Wt 248 lb (112.5 kg)   LMP 10/24/2012   BMI 41.91 kg/m²   Airway:Normal  Cardiac:Normal  Pulmonary:Normal  Abdomen:Normal        Pre-Procedure Assessment/Plan:  ASA 3 - Patient with moderate systemic disease with functional limitations    Mallampati Airway Assessment:  Mallampati Class III - (soft palate & base of uvula are visible)    Level of Sedation Plan: Moderate sedation    Post Procedure plan: Return to same level of care    I assessed the patient and find that the patient is in satisfactory condition to proceed with the planned procedure and sedation plan. I have explained the risk, benefits, and alternatives to the procedure. The patient understands and agrees to proceed.   Yes    Katharine Perez

## 2020-07-07 NOTE — OP NOTE
[facility]                                OPERATIVE REPORT    PATIENT NAME: Christal BADILLO                    :        1965  MED REC NO:   [id number]                          ROOM:  ACCOUNT NO:   [de-identified]                           ADMIT DATE: 2020  PROVIDER:     Senthil Perez MD    DATE OF PROCEDURE:  2020    ANGIOGRAM PROCEDURE REPORT    PREOPERATIVE DIAGNOSES:  Peripheral vascular disease with multiple lower  extremity ulcerations. POSTOPERATIVE DIAGNOSES:  Peripheral vascular disease with multiple  lower extremity ulcerations. PROCEDURE PERFORMED:  1. Ultrasound-guided left femoral artery access. 2.  Third order catheterization of the right distal femoral popliteal  bypass graft. 3.  Abdominal aortogram and bilateral lower extremity angiogram with  carbon dioxide. ANESTHESIA:  Local with moderate monitored sedation. INDICATIONS:  The patient is a 61-year-old female with history of  peripheral vascular disease and renal insufficiency. She has undergone  prior right lower extremity bypass and left lower extremity endovascular  interventions. Recent duplex examination suggested very slow flow  within the graft questioning an unrecognized stenosis and impending  graft failure. There was also suggestion of a significant left  superficial femoral artery stenosis. The patient is brought to the  angio suite at this time to undergo angiography with possible  intervention. PROCEDURE:  The patient was brought to the angio suite, placed in supine  position. Under my direct supervision Versed and fentanyl were  administered for moderate sedation. The patient was monitored by an  independent trained nurse observer using continuous blood pressure, EKG  and pulse oximetry. I spent 30 minutes face-to-face with the patient  providing and directing sedation.   The left groin was then prepped and  draped in sterile fashion. Ultrasound was used to identify the common  femoral artery. This was then accessed with a 5-[place] introducer  sheath. A digital copy of the ultrasound image was saved and placed in  the patient's record. Bentson wire was advanced into the abdominal  aorta and modified hook catheter was advanced over the wire. CO2  aortogram was then obtained. The catheter was then used to cannulate  the right iliac artery and the wire and catheter were advanced to the  common femoral level. CO2 angiogram was performed. The right thigh  region, the distal anastomosis of the bypass graft was somewhat  difficult to visualize. Therefore, I advanced the wire into the bypass  graft and then advanced an angled glide catheter into the distal bypass  grafting using dilute contrast.  The distal portion of the bypass and  the below-knee vasculature were evaluated using a total of 5 mL of  contrast material.  The catheter was then removed and CO2 injection  through the sideport of the sheath was utilized to examine the arteries  of the left lower extremity. A 5-[place] Mynx closure device was then  placed, deployed in standard fashion achieving excellent hemostasis. The patient was transferred to the recovery area following this  procedure. Estimated blood loss was minimal.    ANGIOGRAPHIC FINDINGS:  Abdominal aortogram demonstrates a patent celiac  and superior mesenteric artery. The right renal artery appears to be  patent without stenosis. There does appear to be at least a moderate to  high-grade stenosis of the left renal artery just beyond its origin. Iliac arteries are patent. The right lower extremity bypass graft is  patent without any evidence of stenosis. There is slow flow within the  graft, but this seems to be more secondary to a low cardiac output state  than any stenosis.   There is single vessel runoff via a peroneal artery,  which reconstitutes the posterior tibial at the ankle level. The left  superficial femoral artery proximal stent is patent. There is patent  superficial femoral artery with approximately 50% or less stenosis in  the mid superficial femoral artery. However, there is brisk flow beyond  this. There does appear to be three-vessel runoff below the knee.         Richie Irvin MD    D: 07/07/2020 10:28:33       T: 07/07/2020 10:33:47     [email or web address]  Job#: [id number]     Doc#: [id number]    CC:

## 2020-07-08 ENCOUNTER — HOSPITAL ENCOUNTER (EMERGENCY)
Age: 55
Discharge: HOME | End: 2020-07-08
Attending: EMERGENCY MEDICINE
Payer: COMMERCIAL

## 2020-07-08 VITALS
OXYGEN SATURATION: 100 % | SYSTOLIC BLOOD PRESSURE: 105 MMHG | WEIGHT: 247 LBS | HEART RATE: 99 BPM | RESPIRATION RATE: 18 BRPM | TEMPERATURE: 97.5 F | DIASTOLIC BLOOD PRESSURE: 71 MMHG | BODY MASS INDEX: 42.17 KG/M2 | HEIGHT: 64 IN

## 2020-07-08 LAB
GLUCOSE BLD-MCNC: 48 MG/DL (ref 70–99)
GLUCOSE BLD-MCNC: 69 MG/DL (ref 70–99)
GLUCOSE BLD-MCNC: 72 MG/DL (ref 70–99)
PERFORMED ON: ABNORMAL
PERFORMED ON: ABNORMAL
PERFORMED ON: NORMAL

## 2020-07-08 PROCEDURE — 99285 EMERGENCY DEPT VISIT HI MDM: CPT

## 2020-07-08 ASSESSMENT — ENCOUNTER SYMPTOMS
BACK PAIN: 0
ABDOMINAL PAIN: 0
SHORTNESS OF BREATH: 0
WHEEZING: 0

## 2020-07-08 NOTE — ED NOTES
Dr. Jae Bowen notified of BS. Per Dr. Jae Bowen, pt ok to DC after eating. Provided pt with juice and boxed lunch.       Álvaro Garcia RN  07/08/20 1938

## 2020-07-08 NOTE — ED PROVIDER NOTES
Gastrointestinal: Negative for abdominal pain. Genitourinary: Negative for difficulty urinating. Musculoskeletal: Negative for back pain and neck pain. Neurological: Positive for light-headedness. Negative for dizziness, numbness and headaches. Psychiatric/Behavioral: Negative for behavioral problems. All other systems reviewed and are negative. Except as noted above the remainder of the review of systems was reviewed and negative.        PAST MEDICAL HISTORY     Past Medical History:   Diagnosis Date    Acute kidney injury ([facility] [address] 12/25/2019    Acute on chronic congestive heart failure ([address]     Alcohol dependence ([address] 3/10/2010    Cellulitis 6/3/2020    Diabetic ulcer of left foot associated with type 2 diabetes mellitus ([facility] [address] 1/18/2020    Diabetic ulcer of toe of right foot associated with type 2 diabetes mellitus ([facility] [address] 1/18/2020    MI (myocardial infarction) ([facility] [address]     Positive FIT (fecal immunochemical test) 2/28/2020         SURGICAL HISTORY       Past Surgical History:   Procedure Laterality Date    ARTERIAL BYPASS SURGRY Left 01/06/2016    Axillary/Subclavian to Brachial Bypass w/reversed SVG    CARDIAC CATHETERIZATION  03/27/2018    Dr. Yoel Wilkerson - [address]  01/20/2020    Dr. Kory Stovall      pancreatitis post surgery    CORONARY ANGIOPLASTY WITH STENT PLACEMENT  03/16/2018    MELODY- 3.5 x 38 and 3.5 x 20 to Cx    CORONARY ANGIOPLASTY WITH STENT PLACEMENT  01/03/2018    MELODY- 3.0 x 38 and 2.75 x 26 and 2.75 x 8 and 2.75 x 22 to the LAD    CORONARY ARTERY BYPASS GRAFT N/A 1/27/2020    CORONARY ARTERY BYPASS GRAFTING X 1, ON PUMP BEATING HEART, INTERNAL MAMMARY ARTERY TO LEFT ANTERIOR DESCENDING ARTERY, VAS CATH INSERTION, URI, PLATELET GEL APPLICATION, 5 LEVEL BILATERAL INTERCOSTAL NERVE BLOCK, STERNAL PLATING performed by Pavan Mason MD at [address] [facility] [address] Right 5/16/2019    fem-pop, performed by Donna Elkins MD at [address]  02/14/2019    CardioMEMs insertion for CHF    ROTATOR CUFF REPAIR Left 04/22/2016    TONSILLECTOMY      TRANSLUMINAL ANGIOPLASTY Left 10/08/2019    with stenting, at [facility]    TRANSLUMINAL ANGIOPLASTY Left 04/29/2020    of the [facility] re-occlusion    TUMOR EXCISION      benign tumors X 2 chest & axilla    UPPER GASTROINTESTINAL ENDOSCOPY  4/25/2013    BX barretts, HH, gastritis    UPPER GASTROINTESTINAL ENDOSCOPY  12/10/2015    UPPER GASTROINTESTINAL ENDOSCOPY  01/06/2017    Gastritis    VASCULAR SURGERY Left 12/08/2015    upper extremity and mesenteric angiogram (diagnostic only)         CURRENT MEDICATIONS       Previous Medications    ALBUTEROL SULFATE  (90 BASE) MCG/ACT INHALER    Inhale 2 puffs into the lungs every 6 hours as needed for Wheezing or Shortness of Breath    ARIPIPRAZOLE (ABILIFY) 10 MG TABLET    Take 10 mg by mouth daily     ASPIRIN EC 81 MG EC TABLET    Take 1 tablet by mouth daily    ATORVASTATIN (LIPITOR) 80 MG TABLET    Take 1 tablet by mouth nightly    BENZTROPINE (COGENTIN) 1 MG TABLET    Take 1 mg by mouth nightly     BLOOD GLUCOSE TEST STRIPS (EXACTECH TEST) STRIP    6 times daily. BUPRENORPHINE-NALOXONE (SUBOXONE) 8-2 MG FILM SL FILM    Place 1 Film under the tongue 2 times daily.     BUSPIRONE (BUSPAR) 30 MG TABLET    Take 30 mg by mouth 2 times daily     CLOPIDOGREL (PLAVIX) 75 MG TABLET    TAKE 1 TABLET BY MOUTH EVERY DAY    INSULIN DEGLUDEC (TRESIBA FLEXTOUCH) 100 UNIT/ML SOPN    Inject 30 Units into the skin nightly    INSULIN LISPRO, 1 UNIT DIAL, 100 UNIT/ML SOPN    Inject 10 Units into the skin 3 times daily    INSULIN PEN NEEDLE (B-D ULTRAFINE III SHORT PEN) 31G X 8 MM MISC    Five shots a day    INSULIN SYRINGE .5CC/29G 29G X 1/2\" 0.5 ML MISC    1 each by Does not apply route daily    LAMOTRIGINE (LAMICTAL) 150 MG TABLET    Take 150 mg by mouth 2 times daily     LIRAGLUTIDE (VICTOZA) 18 MG/3ML SOPN SC INJECTION    Inject 1.2 mg into the skin daily    MAGNESIUM OXIDE (MAG-OX) 400 (240 MG) MG TABLET    TAKE 1 TABLET ONCE DAILY    METOLAZONE (ZAROXOLYN) 2.5 MG TABLET    Take every Monday, Wednesday, Friday    MIDODRINE (PROAMATINE) 5 MG TABLET    Take 1.5 tablets by mouth 3 times daily    PANTOPRAZOLE (PROTONIX) 40 MG TABLET    Take 1 tablet by mouth 2 times daily    POTASSIUM CHLORIDE (KLOR-CON M) 20 MEQ EXTENDED RELEASE TABLET    Take 2 tablets by mouth 4 times daily    SPIRONOLACTONE (ALDACTONE) 25 MG TABLET    Take 1 tablet by mouth daily    TIOTROPIUM (SPIRIVA RESPIMAT) 2.5 MCG/ACT AERS INHALER    INHALE 2 PUFFS INTO THE LUNGS DAILY    TORSEMIDE (DEMADEX) 100 MG TABLET    Take 1 tablet by mouth daily 100 mg daily except 50 mg twice weekly (Monday and Thursday).        ALLERGIES     Lisinopril    FAMILY HISTORY       Family History   Problem Relation Age of Onset    Heart Disease Maternal Grandmother     Cancer Mother         lung and brain cancer    Cancer Maternal Aunt         lung and brain cancer          SOCIAL HISTORY       Social History     Socioeconomic History    Marital status: Single     Spouse name: None    Number of children: None    Years of education: None    Highest education level: None   Occupational History    None   Social Needs    Financial resource strain: None    Food insecurity     Worry: None     Inability: None    Transportation needs     Medical: None     Non-medical: None   Tobacco Use    Smoking status: Current Every Day Smoker     Packs/day: 1.00     Years: 30.00     Pack years: 30.00     Types: Cigarettes     Last attempt to quit: 2019     Years since quittin.9    Smokeless tobacco: Never Used    Tobacco comment: trying to quit, one cigarette daily   Substance and Sexual Activity    Alcohol use: No     Comment: quit      Drug use: Never    Sexual activity: Yes     Partners: Male   Lifestyle    Physical activity     Days per week: None     Minutes per session: None    Stress: None   Relationships    Social connections     Talks on phone: None     Gets together: None     Attends Gnosticism service: None     Active member of club or organization: None     Attends meetings of clubs or organizations: None     Relationship status: None    Intimate partner violence     Fear of current or ex partner: None     Emotionally abused: None     Physically abused: None     Forced sexual activity: None   Other Topics Concern    None   Social History Narrative    None       SCREENINGS    Silvia Coma Scale  Eye Opening: Spontaneous  Best Verbal Response: Oriented  Best Motor Response: Obeys commands  Silvia Coma Scale Score: 15          PHYSICAL EXAM    (up to 7 for level 4, 8 or more for level 5)     ED Triage Vitals [07/08/20 1018]   BP Temp Temp Source Pulse Resp SpO2 Height Weight   111/66 97.5 °F (36.4 °C) Oral 95 16 100 % 5' 4\" (1.626 m) 247 lb (112 kg)       Physical Exam  Vitals signs and nursing note reviewed. Constitutional:       General: She is not in acute distress. Appearance: She is well-developed. She is obese. She is ill-appearing. She is not diaphoretic. HENT:      Head: Normocephalic. Right Ear: Tympanic membrane and ear canal normal.      Left Ear: Tympanic membrane and ear canal normal.   Eyes:      Conjunctiva/sclera: Conjunctivae normal.      Pupils: Pupils are equal, round, and reactive to light. Neck:      Musculoskeletal: Normal range of motion and neck supple. Thyroid: No thyromegaly. Cardiovascular:      Rate and Rhythm: Normal rate and regular rhythm. Heart sounds: Normal heart sounds. No murmur. No friction rub. No gallop. Pulmonary:      Effort: Pulmonary effort is normal. No respiratory distress. Breath sounds: Normal breath sounds. Abdominal:      General: Bowel sounds are normal. There is no distension. Palpations: Abdomen is soft. Tenderness: There is no abdominal tenderness.    Neurological: Mental Status: She is alert and oriented to person, place, and time. GCS: GCS eye subscore is 4. GCS verbal subscore is 5. GCS motor subscore is 6. Cranial Nerves: No cranial nerve deficit. Sensory: No sensory deficit. Motor: No abnormal muscle tone. Coordination: Coordination normal.      Deep Tendon Reflexes: Reflexes normal.   Psychiatric:         Behavior: Behavior normal. Behavior is cooperative.          DIAGNOSTIC RESULTS     EKG: All EKG's are interpreted by the Emergency Department Physician who either signs or Co-signs this chart in the absence of a cardiologist.        RADIOLOGY:   Non-plain film images such as CT, Ultrasound and MRI are read by the radiologist. Cristina Yaya radiographic images are visualized and preliminarily interpreted by the emergency physician with the below findings:        Interpretation per the Radiologist below, if available at the time of this note:    No orders to display           LABS:  Results for orders placed or performed during the hospital encounter of 07/08/20   POCT Glucose   Result Value Ref Range    POC Glucose 48 (LL) 70 - 99 mg/dl    Performed on ACCU-CHEK    POCT Glucose   Result Value Ref Range    POC Glucose 69 (L) 70 - 99 mg/dl    Performed on ACCU-CHEK             EMERGENCY DEPARTMENT COURSE and DIFFERENTIAL DIAGNOSIS/MDM:     Vitals:    07/08/20 1018   BP: 111/66   Pulse: 95   Resp: 16   Temp: 97.5 °F (36.4 °C)   TempSrc: Oral   SpO2: 100%   Weight: 247 lb (112 kg)   Height: 5' 4\" (1.626 m)           MDM      REASSESSMENT      Patient denies any other symptoms at this point  A repeat blood sugar after once juice and sugar took her up to 69 therefore we are giving her sandwich another box of juice I think at this point she is wanting to go home she says I just want to go home and eat she has no neurological deficits therefore she was reassured she is advised not to take her insulin and not to eat  CRITICAL CARE TIME CONSULTS:  None      PROCEDURES:     Procedures    MEDICATIONS GIVEN THIS VISIT:  Medications - No data to display     FINAL IMPRESSION      1. Hypoglycemia            DISPOSITION/PLAN   DISPOSITION Decision To Discharge 07/08/2020 11:21:59 AM      PATIENT REFERRED TO:  Raz Ford PA-C  [facility] [address]   [phone]    Schedule an appointment as soon as possible for a visit   As needed, If symptoms worsen      DISCHARGE MEDICATIONS:  New Prescriptions    No medications on file       Controlled Substances Monitoring  RX Monitoring 4/30/2020   Chronic Pain > 50 MEDD Re-evaluated the status of the patient's underlying condition causing pain. (Please note that portions of this note were completed with a voice recognition program.  Efforts were made to edit the dictations but occasionally words are mis-transcribed.)    Patient was advised to return to the Emergency Department if there was any worsening.     Thuan Menard MD (electronically signed)  Attending Emergency Physician          Darby Abraham MD  07/08/20 0468

## 2020-07-09 ENCOUNTER — CARE COORDINATION (OUTPATIENT)
Dept: CARE COORDINATION | Age: 55
End: 2020-07-09

## 2020-07-09 ENCOUNTER — HOSPITAL ENCOUNTER (OUTPATIENT)
Dept: WOUND CARE | Age: 55
Discharge: HOME | End: 2020-07-09
Payer: COMMERCIAL

## 2020-07-09 ENCOUNTER — TELEPHONE (OUTPATIENT)
Dept: VASCULAR SURGERY | Age: 55
End: 2020-07-09

## 2020-07-09 VITALS
SYSTOLIC BLOOD PRESSURE: 119 MMHG | WEIGHT: 247 LBS | DIASTOLIC BLOOD PRESSURE: 78 MMHG | BODY MASS INDEX: 41.15 KG/M2 | TEMPERATURE: 98.8 F | RESPIRATION RATE: 18 BRPM | HEIGHT: 65 IN | HEART RATE: 110 BPM

## 2020-07-09 PROCEDURE — 97597 DBRDMT OPN WND 1ST 20 CM/<: CPT | Performed by: INTERNAL MEDICINE

## 2020-07-09 PROCEDURE — 97597 DBRDMT OPN WND 1ST 20 CM/<: CPT

## 2020-07-09 RX ORDER — LIDOCAINE 40 MG/G
CREAM TOPICAL ONCE
Status: DISCONTINUED | OUTPATIENT
Start: 2020-07-09 | End: 2020-07-10 | Stop reason: HOSPADM

## 2020-07-09 NOTE — TELEPHONE ENCOUNTER
Called patient regarding scheduling renal artery angiogram/angioplasty. She Cannot do this for next two weeks due to having scheduled apts.  So first time to schedule is August 4, 20 at 7:30am /arrive at [facility]

## 2020-07-09 NOTE — CARE COORDINATION
Patient contacted regarding Gregg Arnold. Discussed COVID-19 related testing which was not done at this time. Test results were not done. Patient informed of results, if available? N/A    Care Transition Nurse/ Ambulatory Care Manager contacted the patient by telephone to perform post discharge assessment. Verified name and  with patient as identifiers. Provided introduction to self, and explanation of the CTN/ACM role, and reason for call due to risk factors for infection and/or exposure to COVID-19. Symptoms reviewed with parent who verbalized the following symptoms: no new symptoms and no worsening symptoms. Had pt check her blood sugar during this encounter. Blood sugar 146; asymptomatic. States she is continuing to slowly advance diet s/p vomitting multiple times after diagnostic testing on Tuesday. Pt states she did make outreach to PCP and ED visit yesterday followed. She is tolerating small, bland foods. Verbalized hesitancy to advance diet much at this time. ACM reviewed concerns of hypoglycemia; reviewed s/s to report, when to notify physician. Due to no new or worsening symptoms encounter was not routed to provider for escalation. Discussed follow-up appointments. If no appointment was previously scheduled, appointment scheduling offered: Yes; declines need for assistance in scheduling/outreach to PCP, stating she already spoke w/provider this week & intends to follow up w/PCP office again. Pt states she follows w/wound clinic also and 'just have so many medical appts. .. I get tired'. Attempted health coaching/encouraging pt in remaining proactive vs reactive; commended pt for her follow through to date & encouraged pt to continue following w/providers involved in her care per provider recommendations.   [facility] follow up appointment(s):   Future Appointments   Date Time Provider Dionicio Fleming   2020 12:45 PM Patricia Armas MD [address]   2020 12:45 PM Dianna Al MD [place] [address] HOD   7/23/2020 12:45 PM Won Culp MD [address] HOD   7/30/2020 12:45 PM Won Culp MD [address] HOD   8/6/2020 12:45 PM Won Culp MD [address] HOD   8/13/2020 12:45 PM Won Culp MD [address] HOD   8/20/2020 12:45 PM Won Culp MD [address] HOD   8/24/2020 10:00 AM MMO CT  [address] [facility]   8/27/2020 12:45 PM Won Culp MD [address] HOD   8/27/2020  2:15 PM Afsaneh Jordan MD [facility]   9/3/2020 12:45 PM Won Culp MD [facility] HOD   9/10/2020 12:45 PM Won Culp MD [address] HOD   9/17/2020 12:45 PM Won Culp MD [address] HOD   9/22/2020  1:40 PM TRAY Davis - CNP EG ENDO [facility]   9/24/2020 12:45 PM Won Culp MD [address] HOD   10/1/2020 12:45 PM Won Culp MD [facility] Jamil Navi HOD   10/8/2020 12:45 PM Won Culp MD [address] HOD   10/15/2020 12:45 PM Won Culp MD [address] HOD   10/22/2020 12:45 PM Won Culp MD [address] HOD   10/29/2020 12:45 PM Won Culp MD [address] HOD   11/5/2020 12:45 PM Won Culp MD [address] HOD   11/12/2020 12:45 PM Won Culp MD [address] HOD   11/19/2020 12:45 PM Won Culp MD [address] HOD   11/26/2020 12:45 PM Won Culp MD [address] [facility]   12/3/2020 12:45 PM Won Culp MD [address] [facility]   12/10/2020 12:45 PM Won Culp MD [address] [facility]   12/17/2020 12:45 PM Won Culp MD [facility]     [facility] follow up appointment(s):      Patient has following risk factors of: heart failure, COPD, diabetes and obesity. CTN/ACM reviewed discharge instructions, medical action plan and red flags such as increased shortness of breath, increasing fever and signs of decompensation with patient who verbalized understanding.    Discussed exposure protocols and quarantine with CDC Guidelines What to do if you are sick with coronavirus disease 2019.  Patient was given an opportunity for questions and concerns. The patient agrees to contact the [facility] exposure line [phone], local [facility] department [place] [facility]: ([phone]) and PCP office for questions related to their healthcare. CTN/ACM provided contact information for future needs. Reviewed and educated patient on any new and changed medications related to discharge diagnosis     Patient/family/caregiver given information for GetWell Loop and agrees to enroll yes  Patient's preferred e-mail: [email or web address]   Patient's preferred phone number: [phone]  Based on Loop alert triggers, patient will be contacted by nurse care manager for worsening symptoms. Pt verbalized understanding of above and agreement with the following  Plan:  Pt will be further monitored by COVID Loop Team based on severity of symptoms and risk factors.

## 2020-07-13 PROBLEM — I87.2 STASIS DERMATITIS OF BOTH LEGS: Status: RESOLVED | Noted: 2020-06-22 | Resolved: 2020-07-13

## 2020-07-13 PROBLEM — I87.2 VENOUS STASIS ULCER OF RIGHT CALF WITH FAT LAYER EXPOSED WITHOUT VARICOSE VEINS (HCC): Status: RESOLVED | Noted: 2020-07-02 | Resolved: 2020-07-13

## 2020-07-13 PROBLEM — E87.5 HYPERKALEMIA: Status: RESOLVED | Noted: 2020-06-29 | Resolved: 2020-07-13

## 2020-07-13 PROBLEM — L97.222 VENOUS STASIS ULCER OF LEFT CALF WITH FAT LAYER EXPOSED WITHOUT VARICOSE VEINS (HCC): Status: RESOLVED | Noted: 2020-07-02 | Resolved: 2020-07-13

## 2020-07-13 PROBLEM — N17.9 ACUTE ON CHRONIC RENAL FAILURE (HCC): Status: RESOLVED | Noted: 2020-06-29 | Resolved: 2020-07-13

## 2020-07-13 PROBLEM — I87.2 VENOUS STASIS ULCER OF LEFT CALF WITH FAT LAYER EXPOSED WITHOUT VARICOSE VEINS (HCC): Status: RESOLVED | Noted: 2020-07-02 | Resolved: 2020-07-13

## 2020-07-13 PROBLEM — L97.212 VENOUS STASIS ULCER OF RIGHT CALF WITH FAT LAYER EXPOSED WITHOUT VARICOSE VEINS (HCC): Status: RESOLVED | Noted: 2020-07-02 | Resolved: 2020-07-13

## 2020-07-13 PROBLEM — N18.9 ACUTE ON CHRONIC RENAL FAILURE (HCC): Status: RESOLVED | Noted: 2020-06-29 | Resolved: 2020-07-13

## 2020-07-13 NOTE — PROGRESS NOTES
[facility] Progress Note    Brad Bautista     : 1965    DATE OF VISIT:  2020    Subjective:     Teodora Haney is a 62 y.o. female who has a venous ulcer located on the bilateral lower leg. Significant symptoms or pertinent wound history since last visit: since she was last here, Mrs. Shay Johnston was admitted to the hospital for a short time for SHAUNNA, fluid weight gain and electrolyte abnormalities, later had a separate ER visit for hypoglycemia, and did have her BL LE angiogram performed by Dr. Carlos A Cifuentes -- she does have some stenotic disease, but nothing that appeared to be significant enough to explain her nonhealing leg ulcers -- seem more likely nonhealing on the basis of edema + poor cardiac output leading to poor perfusion, plus underlying issues of her DM, smoking, etc.     No current F/C/D, no pus or malodor from ulcers. Wearing medium-strength Spandagrip, but little improvement in her swelling. Doing ok with dressing changes. Additional ulcer(s) noted? no.  Left cluster doing ok, right cluster stagnant. Her current medication list consists of ARIPiprazole, INSULIN SYRINGE .5CC/29G, Insulin Degludec, Insulin Pen Needle, Liraglutide, albuterol sulfate HFA, aspirin EC, atorvastatin, benztropine, blood glucose test strips, buprenorphine-naloxone, busPIRone, clopidogrel, insulin lispro (1 Unit Dial), lamoTRIgine, magnesium oxide, metOLazone, midodrine, pantoprazole, potassium chloride, spironolactone, tiotropium, and torsemide.     Allergies: Lisinopril    Objective:     Vitals:    20 1242   BP: 119/78   Pulse: 110   Resp: 18   Temp: 98.8 °F (37.1 °C)   TempSrc: Oral   Weight: 247 lb (112 kg)   Height: 5' 4.5\" (1.638 m)     AAOx3, chronically ill appearing, weak and fatigued, NAD  Dopplerable distal pulses, feet a bit cool, poor cap refill, some patchy cyanosis of toes  No cellulitis, angitis, fluctuance  Less allodynia around the right leg ulcers, I think   Moderate BL LE edema, with no improvement from when I first saw her  Shaye-ulcer skin: indurated, pink, warm, hyperkeratotic and very light erythema in a few areas. Ulcer(s): left leg cluster smaller, all partial thickness, part granular but part fibrotic, some fibrin and biofilm; on the right side, the ulcers are still larger, more fibrotic, less granular, also fibrin and biofilm, serous exudate. Photos also saved in electronic chart. Today's wound measurements, per RN documentation:  Wound 06/18/20 #1, left lower leg cluster, venous, partial thickness, onset 6/1/2020-Wound Length (cm): 0.9 cm  Wound 06/18/20 #2, right lower leg cluster, venous, full thickness, onset 6/1/2020-Wound Length (cm): 12 cm    Wound 06/18/20 #1, left lower leg cluster, venous, partial thickness, onset 6/1/2020-Wound Width (cm): 0.5 cm  Wound 06/18/20 #2, right lower leg cluster, venous, full thickness, onset 6/1/2020-Wound Width (cm): 19 cm    Wound 06/18/20 #1, left lower leg cluster, venous, partial thickness, onset 6/1/2020-Wound Depth (cm): 0.1 cm  Wound 06/18/20 #2, right lower leg cluster, venous, full thickness, onset 6/1/2020-Wound Depth (cm): 0.2 cm   __________________    July 7 LE angiogram -- Abdominal aortogram demonstrates a patent celiac and superior mesenteric artery. The right renal artery appears to be patent without stenosis. There does appear to be at least a moderate to high-grade stenosis of the left renal artery just beyond its origin. Iliac arteries are patent. The right lower extremity bypass graft is patent without any evidence of stenosis. There is slow flow within the graft, but this seems to be more secondary to a low cardiac output state than any stenosis. There is single vessel runoff via a peroneal artery, which reconstitutes the posterior tibial at the ankle level. The left superficial femoral artery proximal stent is patent.   There is patent superficial femoral artery with approximately 50% or less stenosis in the mid superficial femoral artery. However, there is brisk flow beyond this. There does appear to be three-vessel runoff below the knee.     Assessment:     Patient Active Problem List   Diagnosis Code    Type 2 diabetes mellitus with diabetic polyneuropathy, with long-term current use of insulin ([facility]) E11.42, Z79.4    Essential hypertension I10    CLINT (obstructive sleep apnea) G47.33    Tobacco abuse disorder Z72.0    GERD (gastroesophageal reflux disease) K21.9    Anxiety and depression F41.9, F32.9    Hyponatremia E87.1    Iron deficiency anemia D50.9    CAD (coronary artery disease) I25.10    Mitral valve regurgitation I34.0    Stage 3 chronic kidney disease ([facility]) N18.3    Claudication in peripheral vascular disease ([facility]) I73.9    COPD (chronic obstructive pulmonary disease) ([facility]) J44.9    Vitamin D deficiency E55.9    Acute on chronic systolic congestive heart failure (HCC) I50.23    Dyslipidemia E78.5    Abel's esophagus K22.70    Viral hepatitis C B19.20    Pulmonary nodule R91.1    Class 2 severe obesity due to excess calories with serious comorbidity and body mass index (BMI) of 39.0 to 39.9 in adult ([facility]) E66.01, Z68.39    Bipolar disorder ([facility]) F31.9    Hypotension - chronic I95.9    Pulmonary hypertension ([facility]) I27.20    Bilateral lower extremity edema R60.0    Habitual self-excoriation F42.4    Ulcer of left lower leg, limited to breakdown of skin ([facility]) L97.921    Ulcer of right leg, with fat layer exposed ([facility] [address] L97.912    Arthritis M19.90    Cardiomyopathy (HCC) I42.9    Chronic systolic heart failure (HCC) I50.22    Acute on chronic renal insufficiency N28.9, N18.9    Peripheral venous insufficiency I87.2    Diabetic polyneuropathy associated with type 2 diabetes mellitus ([facility]) E11.42    Atherosclerosis of native artery of both lower extremities with bilateral ulceration of calves ([facility]) I70.232, I70.242       Assessment of today's active condition(s): BL lower extremity ulcers on the basis of stasis changes, edema, and I agree probably some malperfusion related to poor cardiac output and the edema. A bit of a tricky balance in terms of wanting some additional compression to help her edema, but we can't go too quickly or too aggressively, for fear of causing acute CHF, especially with her CKD. No signs of active infection. Factors contributing to occurrence and/or persistence of the chronic ulcer include edema, venous stasis, diabetes, decreased mobility, obesity, smoking and decreased tissue oxygenation. Medical necessity of today's visit is shown by the above documentation. Sharp debridement is indicated today, based upon the exam findings in the wound(s) above. Procedure note:     Consent obtained. Time out performed per [facility]. Anesthetic  Anesthetic: 4% Lidocaine Cream     Using a curette, I sharply debrided the bilateral lower leg ulcer(s) down through and including the removal of dermis. The type(s) of tissue debrided included fibrin, biofilm and exudate. Total Surface Area Debrided: 20 sq cm. The ulcers were then irrigated with normal saline solution. The procedure was completed with a small amount of bleeding, and hemostasis was with pressure. The patient tolerated the procedure well, with no significant complications. The patient's level of pain during and after the procedure was monitored. Post-debridement measurements, if different from pre-debridement, are in the flowsheet as well. Discharge plan:     Treatment in the wound care center today, per RN documentation: Wound 06/18/20 #1, left lower leg cluster, venous, partial thickness, onset 6/1/2020-Dressing/Treatment: Other (comment)(triad/pso,4x4,kerlix,surepress)  Wound 06/18/20 #2, right lower leg cluster, venous, full thickness, onset 6/1/2020-Dressing/Treatment: Other (comment)(triad/pso,4x4,kerlix,surepress).     I reminded the patient of the importance of weight management and smoking cessation, if applicable; also encouraged ambulation as tolerated, additional lower extremity exercises as instructed in our education sheet, leg elevation when at rest, and compliance with any recommended dietary, diuretic and compression therapies. Will try to change from [address] to [facility], and see if we can make some more progress with her swelling. Tighter Spandagrip could be very uncomfortable and hard to don. Compression stockings have no real chance of being successful. Velcro compression garments would be a better option, but her insurance does not cover them, and she says the out-of-pocket cost is prohibitive. If she notices any symptoms of increased shortness of breath, chest pain, abdominal bloating, etc, back off on the SurePress tension, or go back to [address] [facility] if needed. Home treatment: good handwashing before and after any dressing changes. Cleanse wound with saline or soap & water before dressing change. May use Vaseline (petrolatum), Aquaphor, Aveeno, CeraVe, Cetaphil, Eucerin, Lubriderm, etc for dry skin. Dressing type for home: as above, once daily. Written discharge instructions given to patient. Follow up in 1 week.     Electronically signed by Sarai Maloney MD on 7/13/2020 at 10:39 AM.

## 2020-07-15 ENCOUNTER — TELEPHONE (OUTPATIENT)
Dept: CARDIOLOGY CLINIC | Age: 55
End: 2020-07-15

## 2020-07-16 ENCOUNTER — HOSPITAL ENCOUNTER (OUTPATIENT)
Dept: WOUND CARE | Age: 55
Discharge: HOME | End: 2020-07-16
Payer: COMMERCIAL

## 2020-07-16 VITALS
SYSTOLIC BLOOD PRESSURE: 108 MMHG | TEMPERATURE: 98.1 F | WEIGHT: 256 LBS | BODY MASS INDEX: 42.65 KG/M2 | RESPIRATION RATE: 20 BRPM | DIASTOLIC BLOOD PRESSURE: 70 MMHG | HEART RATE: 98 BPM | HEIGHT: 65 IN

## 2020-07-16 PROCEDURE — 97597 DBRDMT OPN WND 1ST 20 CM/<: CPT

## 2020-07-16 PROCEDURE — 97597 DBRDMT OPN WND 1ST 20 CM/<: CPT | Performed by: INTERNAL MEDICINE

## 2020-07-16 RX ORDER — LIDOCAINE 40 MG/G
CREAM TOPICAL ONCE
Status: DISCONTINUED | OUTPATIENT
Start: 2020-07-16 | End: 2020-07-17 | Stop reason: HOSPADM

## 2020-07-16 ASSESSMENT — PAIN SCALES - GENERAL: PAINLEVEL_OUTOF10: 0

## 2020-07-16 NOTE — TELEPHONE ENCOUNTER
Let's try to set her up for Lasix 80 mg IV X1 tomorrow. BMP, BNP and CBC along with that. I think she would prefer [place] location. PAD better over past 2 days but has been higher over past week.    Thank you, Sarahi Nieves

## 2020-07-16 NOTE — TELEPHONE ENCOUNTER
She states that she has been holding water since she left hospital.  Weight today is 250 LB. She states that she is SOB but has sinus symptoms and water eyes. She is on her way to wound care and hasn't taken vitals. She has edema from waist down per patient. Her BLE are weeping a little bit.

## 2020-07-16 NOTE — TELEPHONE ENCOUNTER
Spoke to pt. Lasix order for 80 mg IV x1 and lab orders BMP, BNP, CBC, and Magnesium have been faxed to [facility].  Pt V/U.

## 2020-07-17 ENCOUNTER — HOSPITAL ENCOUNTER (OUTPATIENT)
Dept: NURSING | Age: 55
Setting detail: INFUSION SERIES
Discharge: HOME | End: 2020-07-17
Payer: COMMERCIAL

## 2020-07-17 ENCOUNTER — TELEPHONE (OUTPATIENT)
Dept: CARDIOLOGY CLINIC | Age: 55
End: 2020-07-17

## 2020-07-17 VITALS
SYSTOLIC BLOOD PRESSURE: 92 MMHG | HEIGHT: 65 IN | RESPIRATION RATE: 20 BRPM | HEART RATE: 82 BPM | BODY MASS INDEX: 42.15 KG/M2 | DIASTOLIC BLOOD PRESSURE: 62 MMHG | WEIGHT: 253 LBS | TEMPERATURE: 98.4 F

## 2020-07-17 LAB
ANION GAP SERPL CALCULATED.3IONS-SCNC: 13 MMOL/L (ref 3–16)
BUN BLDV-MCNC: 43 MG/DL (ref 7–20)
CALCIUM SERPL-MCNC: 9.8 MG/DL (ref 8.3–10.6)
CHLORIDE BLD-SCNC: 87 MMOL/L (ref 99–110)
CO2: 32 MMOL/L (ref 21–32)
CREAT SERPL-MCNC: 1.3 MG/DL (ref 0.6–1.1)
GFR AFRICAN AMERICAN: 51
GFR NON-AFRICAN AMERICAN: 42
GLUCOSE BLD-MCNC: 47 MG/DL (ref 70–99)
HCT VFR BLD CALC: 36.2 % (ref 36–48)
HEMOGLOBIN: 11.2 G/DL (ref 12–16)
MAGNESIUM: 1.9 MG/DL (ref 1.8–2.4)
MCH RBC QN AUTO: 23.5 PG (ref 26–34)
MCHC RBC AUTO-ENTMCNC: 30.8 G/DL (ref 31–36)
MCV RBC AUTO: 76.4 FL (ref 80–100)
PDW BLD-RTO: 20.9 % (ref 12.4–15.4)
PLATELET # BLD: 361 K/UL (ref 135–450)
PMV BLD AUTO: 8.5 FL (ref 5–10.5)
POTASSIUM SERPL-SCNC: 3.7 MMOL/L (ref 3.5–5.1)
PRO-BNP: 3656 PG/ML (ref 0–124)
RBC # BLD: 4.74 M/UL (ref 4–5.2)
SODIUM BLD-SCNC: 132 MMOL/L (ref 136–145)
WBC # BLD: 6.2 K/UL (ref 4–11)

## 2020-07-17 PROCEDURE — 99211 OFF/OP EST MAY X REQ PHY/QHP: CPT

## 2020-07-17 PROCEDURE — 83880 ASSAY OF NATRIURETIC PEPTIDE: CPT

## 2020-07-17 PROCEDURE — 83735 ASSAY OF MAGNESIUM: CPT

## 2020-07-17 PROCEDURE — 96365 THER/PROPH/DIAG IV INF INIT: CPT

## 2020-07-17 PROCEDURE — 85027 COMPLETE CBC AUTOMATED: CPT

## 2020-07-17 PROCEDURE — 80048 BASIC METABOLIC PNL TOTAL CA: CPT

## 2020-07-17 PROCEDURE — 6360000002 HC RX W HCPCS: Performed by: NURSE PRACTITIONER

## 2020-07-17 PROCEDURE — 2580000003 HC RX 258: Performed by: NURSE PRACTITIONER

## 2020-07-17 RX ORDER — METOLAZONE 2.5 MG/1
TABLET ORAL
Qty: 30 TABLET | Refills: 3 | Status: ON HOLD | OUTPATIENT
Start: 2020-07-17 | End: 2020-08-12 | Stop reason: HOSPADM

## 2020-07-17 RX ORDER — FUROSEMIDE 10 MG/ML
80 INJECTION INTRAMUSCULAR; INTRAVENOUS ONCE
Status: DISCONTINUED | OUTPATIENT
Start: 2020-07-17 | End: 2020-07-17 | Stop reason: CLARIF

## 2020-07-17 RX ADMIN — FUROSEMIDE 80 MG/HR: 10 INJECTION, SOLUTION INTRAVENOUS at 12:35

## 2020-07-17 ASSESSMENT — PAIN DESCRIPTION - PAIN TYPE: TYPE: CHRONIC PAIN

## 2020-07-17 ASSESSMENT — PAIN DESCRIPTION - DESCRIPTORS: DESCRIPTORS: BURNING

## 2020-07-17 ASSESSMENT — PAIN DESCRIPTION - LOCATION: LOCATION: BUTTOCKS

## 2020-07-17 ASSESSMENT — PAIN SCALES - GENERAL: PAINLEVEL_OUTOF10: 7

## 2020-07-17 NOTE — PROGRESS NOTES
# 22 started on 1st attempt to RAC per JASSI ARRINGTON RN using the Ultrasound   Pt rose mary well  Lasiz 80 mg was started via IVPB without difficulty  Lasix bag was covered during infusion  Pt rose mary well  Family at side  Will monitor

## 2020-07-17 NOTE — TELEPHONE ENCOUNTER
Per NPDD yesterday-Let's try to set her up for Lasix 80 mg IV X1 tomorrow. BMP, BNP and CBC along with that. I think she would prefer [facility] location.      PAD better over past 2 days but has been higher over past week.    Thank you, Charles Dawson

## 2020-07-17 NOTE — TELEPHONE ENCOUNTER
----- Message from TRAY Pettit CNP sent at 7/17/2020  3:20 PM EDT -----  Blood count stable. Glucose was low, treated at infusion center. BNP \"heart failure number\" up. Kidney function looks good actually. Have her take 2 extra potassium today since received the IV Lasix.    Thank you, Patrick Hua

## 2020-07-17 NOTE — PROGRESS NOTES
Received call from lab  Pt's glucose is 47  Pt is currently drinking a pepsi and eating some crackers  Pt was informed and stated she was feeling like it was low but is feeling better now  Will monitor  Still waiting on Ultrasound  Pt is aware

## 2020-07-17 NOTE — PROGRESS NOTES
Pt here ambulatory for a Lasix injection  Pt reports she has been very tired, weak and has gained 9 lbs of water weight since last Thursday Pt reports Lasix has helped her in the past so she is hoping it will do so again  Kurt Clark has been attempted  X 3 per myself without success  Pt has tolerated well  Call has been place for US IV  Pt is aware and agreeable

## 2020-07-20 ENCOUNTER — PROCEDURE VISIT (OUTPATIENT)
Dept: CARDIOLOGY CLINIC | Age: 55
End: 2020-07-20
Payer: COMMERCIAL

## 2020-07-20 PROCEDURE — 93264 REM MNTR WRLS P-ART PRS SNR: CPT | Performed by: NURSE PRACTITIONER

## 2020-07-21 NOTE — PROGRESS NOTES
88 [facility] Progress Note    Louie Bautista     : 1965    DATE OF VISIT:  2020    Subjective:     Noble Libman is a 62 y.o. female who has a venous ulcer located on the bilateral lower leg. Significant symptoms or pertinent wound history since last visit: feeling ok overall, modest drainage on that left side now, still more significant on the right. Swelling not much different - trying her best with change from [address] [employer] [address] to [facility]. No F/C/D, no pus or malodor from wounds. Stable SOB. Additional ulcer(s) noted? yes. Very small ischemic ulcer/eschar on one left lesser toe, not really new. Her current medication list consists of ARIPiprazole, INSULIN SYRINGE .5CC/29G, Insulin Degludec, Insulin Pen Needle, Liraglutide, albuterol sulfate HFA, aspirin EC, atorvastatin, benztropine, blood glucose test strips, buprenorphine-naloxone, busPIRone, clopidogrel, insulin lispro (1 Unit Dial), lamoTRIgine, magnesium oxide, metOLazone, midodrine, pantoprazole, potassium chloride, spironolactone, tiotropium, and torsemide. Allergies: Lisinopril    Objective:     Vitals:    20 1250   BP: 108/70   Pulse: 98   Resp: 20   Temp: 98.1 °F (36.7 °C)   TempSrc: Oral   Weight: 256 lb (116.1 kg)   Height: 5' 4.5\" (1.638 m)     AAOx3, overweight, chronically ill appearing, fatigued, NAD  Dopplerable distal pulses, forefoot cool with slow cap refill BL  Moderate BL LE edema  No cellulitis, angitis, fluctuance  Still some patchy venous erythema, chronic dermatitis, hyperkeratosis  Shaye-ulcer skin: indurated, pink, erythematous , warm and hyperkeratotic. Ulcer(s): left leg cluster ulcers much smaller, mostly pink, but dry, fibrin; right leg cluster ulcers larger on average, a few areas granulating, other areas still more fibrotic, fibrin, biofilm, serous exudate. Photos also saved in electronic chart.     Today's wound measurements, per RN documentation:  Wound 20 #3, left second toe, DFU, [facility] 1, onset 5/1/2020-Wound Length (cm): 1 cm  Wound 06/18/20 #2, right lower leg cluster, venous, full thickness, onset 6/1/2020-Wound Length (cm): 9 cm  Wound 06/18/20 #1, left lower leg cluster, venous, partial thickness, onset 6/1/2020-Wound Length (cm): 1 cm    Wound 07/16/20 #3, left second toe, DFU, godwin 1, onset 5/1/2020-Wound Width (cm): 0.5 cm  Wound 06/18/20 #2, right lower leg cluster, venous, full thickness, onset 6/1/2020-Wound Width (cm): 20 cm  Wound 06/18/20 #1, left lower leg cluster, venous, partial thickness, onset 6/1/2020-Wound Width (cm): 0.7 cm    Wound 07/16/20 #3, left second toe, DFU, godwin 1, onset 5/1/2020-Wound Depth (cm): 0.1 cm  Wound 06/18/20 #2, right lower leg cluster, venous, full thickness, onset 6/1/2020-Wound Depth (cm): 0.1 cm  Wound 06/18/20 #1, left lower leg cluster, venous, partial thickness, onset 6/1/2020-Wound Depth (cm): 0.1 cm    Assessment:     Patient Active Problem List   Diagnosis Code    Type 2 diabetes mellitus with diabetic polyneuropathy, with long-term current use of insulin ([facility]) E11.42, Z79.4    Essential hypertension I10    CLINT (obstructive sleep apnea) G47.33    Tobacco abuse disorder Z72.0    GERD (gastroesophageal reflux disease) K21.9    Anxiety and depression F41.9, F32.9    Hyponatremia E87.1    Iron deficiency anemia D50.9    CAD (coronary artery disease) I25.10    Mitral valve regurgitation I34.0    Stage 3 chronic kidney disease (HCC) N18.3    Claudication in peripheral vascular disease ([facility]) I73.9    COPD (chronic obstructive pulmonary disease) ([facility]) J44.9    Vitamin D deficiency E55.9    Acute on chronic systolic congestive heart failure (HCC) I50.23    Dyslipidemia E78.5    Abel's esophagus K22.70    Viral hepatitis C B19.20    Pulmonary nodule R91.1    Class 2 severe obesity due to excess calories with serious comorbidity and body mass index (BMI) of 39.0 to 39.9 in adult ([facility]) E66.01, Z68.39    Bipolar disorder ([facility] [address] F31.9    Hypotension - chronic I95.9    Pulmonary hypertension ([facility]) I27.20    Bilateral lower extremity edema R60.0    Habitual self-excoriation F42.4    Ulcer of left lower leg, limited to breakdown of skin ([facility] [address] L97.921    Ulcer of right leg, with fat layer exposed ([facility] [address] L97.912    Arthritis M19.90    Cardiomyopathy ([facility]) I42.9    Chronic systolic heart failure ([facility]) I50.22    Acute on chronic renal insufficiency N28.9, N18.9    Peripheral venous insufficiency I87.2    Diabetic polyneuropathy associated with type 2 diabetes mellitus ([facility]) E11.42    Atherosclerosis of native artery of both lower extremities with bilateral ulceration of calves ([facility]) I70.232, I70.242       Assessment of today's active condition(s): DM, PAD, chronic leg edema related to cardiomyopathy, fluid overload, venous stasis and lymphedema; left leg cluster of ulcers generally doing well, a bit dry actually; right leg slower to improve, but we can't be too aggressive with compression given her CHF and PAD. Also a small diabetic / arterial left toe eschar that I think is best managed very conservatively. Factors contributing to occurrence and/or persistence of the chronic ulcer include venous stasis, lymphedema, diabetes, decreased mobility, obesity, smoking, arterial insufficiency and decreased tissue oxygenation. Medical necessity of today's visit is shown by the above documentation. Sharp debridement is indicated today, based upon the exam findings in the wound(s) above. Procedure note:     Consent obtained. Time out performed per [facility] policy. Anesthetic  Anesthetic: 4% Lidocaine Cream     Using a # 10 blade scalpel, I sharply debrided the right lower leg ulcer(s) down through and including the removal of dermis. The type(s) of tissue debrided included fibrin, biofilm and exudate. Total Surface Area Debrided: approx 15 sq cm. The ulcers were then irrigated with normal saline solution.  The

## 2020-07-23 ENCOUNTER — HOSPITAL ENCOUNTER (OUTPATIENT)
Dept: WOUND CARE | Age: 55
Discharge: HOME | End: 2020-07-23
Payer: COMMERCIAL

## 2020-07-23 VITALS
HEART RATE: 107 BPM | OXYGEN SATURATION: 96 % | HEIGHT: 64 IN | BODY MASS INDEX: 43.98 KG/M2 | TEMPERATURE: 98.6 F | RESPIRATION RATE: 22 BRPM | SYSTOLIC BLOOD PRESSURE: 107 MMHG | WEIGHT: 257.6 LBS | DIASTOLIC BLOOD PRESSURE: 66 MMHG

## 2020-07-23 PROCEDURE — 97597 DBRDMT OPN WND 1ST 20 CM/<: CPT | Performed by: INTERNAL MEDICINE

## 2020-07-23 PROCEDURE — 97597 DBRDMT OPN WND 1ST 20 CM/<: CPT

## 2020-07-23 RX ORDER — TRAZODONE HYDROCHLORIDE 100 MG/1
100 TABLET ORAL NIGHTLY PRN
Status: ON HOLD | COMMUNITY
End: 2021-01-01

## 2020-07-23 RX ORDER — LIDOCAINE 40 MG/G
CREAM TOPICAL PRN
Status: DISCONTINUED | OUTPATIENT
Start: 2020-07-23 | End: 2020-07-24 | Stop reason: HOSPADM

## 2020-07-23 ASSESSMENT — PAIN DESCRIPTION - PROGRESSION: CLINICAL_PROGRESSION: NOT CHANGED

## 2020-07-23 ASSESSMENT — PAIN DESCRIPTION - DESCRIPTORS: DESCRIPTORS: SORE

## 2020-07-23 ASSESSMENT — PAIN DESCRIPTION - ONSET: ONSET: ON-GOING

## 2020-07-23 ASSESSMENT — PAIN DESCRIPTION - ORIENTATION: ORIENTATION: LEFT;RIGHT

## 2020-07-23 ASSESSMENT — PAIN DESCRIPTION - LOCATION: LOCATION: LEG

## 2020-07-23 ASSESSMENT — PAIN SCALES - GENERAL
PAINLEVEL_OUTOF10: 3
PAINLEVEL_OUTOF10: 3

## 2020-07-23 ASSESSMENT — PAIN DESCRIPTION - DIRECTION: RADIATING_TOWARDS: DENIES

## 2020-07-23 ASSESSMENT — PAIN DESCRIPTION - FREQUENCY: FREQUENCY: CONTINUOUS

## 2020-07-23 ASSESSMENT — PAIN DESCRIPTION - PAIN TYPE: TYPE: CHRONIC PAIN

## 2020-07-23 ASSESSMENT — PAIN - FUNCTIONAL ASSESSMENT: PAIN_FUNCTIONAL_ASSESSMENT: PREVENTS OR INTERFERES SOME ACTIVE ACTIVITIES AND ADLS

## 2020-07-23 NOTE — PLAN OF CARE
Wound debridement today per Dr. Becca Pugh. Will apply Mupirocin to right lateral foot wound bed daily, Betadine to left 2nd toe, daily, and LLE will receive PSO. Adaptic, gauze,cast padding, ace, single low compression spandagrip which will be left in place x 1 week, f/u x 1 week, MD orders/D/C instructions reviewed with patient, all questions answered; copy of instructions given to patient.

## 2020-07-28 PROBLEM — L97.911 ULCER OF RIGHT LEG, LIMITED TO BREAKDOWN OF SKIN (HCC): Status: ACTIVE | Noted: 2020-06-22

## 2020-07-29 NOTE — PROGRESS NOTES
[facility] Progress Note    [place]     : 1965    DATE OF VISIT:  2020    Subjective:     Frank Duarte is a 62 y.o. female who has a venous ulcer located on the bilateral lower leg. Significant symptoms or pertinent wound history since last visit: not really making good progress overall, in terms of her swelling, her weight, smoking cessation, protecting her feet, etc. No F/C/D, modest drainage on the left, mild-moderate on the right. Removed her left leg wrap so that she could go swimming this week. Had some IV Lasix last week, but weight is not responding well. Additional ulcer(s) noted? yes. Small arterial left toe ulcer looks more stable; tiny right sub-5th-met head ulcer within some tender callus. Her current medication list consists of ARIPiprazole, Doxepin HCl, INSULIN SYRINGE .5CC/29G, Insulin Degludec, Insulin Pen Needle, Liraglutide, albuterol sulfate HFA, aspirin EC, atorvastatin, benztropine, blood glucose test strips, buprenorphine-naloxone, busPIRone, clopidogrel, insulin lispro (1 Unit Dial), lamoTRIgine, magnesium oxide, metOLazone, midodrine, pantoprazole, potassium chloride, spironolactone, tiotropium, torsemide, and traZODone.     Allergies: Lisinopril    Objective:     Vitals:    20 1301   BP: 107/66   Pulse: 107   Resp: 22   Temp: 98.6 °F (37 °C)   TempSrc: Oral   SpO2: 96%   Weight: 257 lb 9.6 oz (116.8 kg)   Height: 5' 4\" (1.626 m)     AAOx3, overweight, chronically ill appearing, significant exertional dyspnea, fatigue, though nontoxic  Dopplerable distal pulses, forefeet cool, cap refill sluggish  BL LE moderate edema, little change in the last few weeks  No cellulitis, angitis, fluctuance  Small but tender callus at right 5th met head, perhaps with a tiny pinhole ulcer there  At the left 2nd toe, a small, superficial, dry eschar -- looks more stable than last week  Shaye-ulcer skin at the legs: indurated, pink, warm and hyperkeratotic. Ulcer(s): left side small openings, mostly dry and some fibrin; right side with a larger cluster, some areas very slowly getting more granular and less fibrotic, but a couple of new areas appear; some dry, some a bit moist, no signs of infection, usual overlying fibrin +/- biofilm; I think areas more tender this week, with ongoing edema. Photos also saved in electronic chart.     Today's wound measurements, per RN documentation:  Wound 07/16/20 #3, left second toe, DFU, godwin 1, onset 5/1/2020-Wound Length (cm): 1 cm  Wound 06/18/20 #1, left lower leg cluster, venous, partial thickness, onset 6/1/2020-Wound Length (cm): 1 cm  Wound 06/18/20 #2, right lower leg cluster, venous, full thickness, onset 6/1/2020-Wound Length (cm): 9 cm    Wound 07/16/20 #3, left second toe, DFU, godwin 1, onset 5/1/2020-Wound Width (cm): 0.3 cm  Wound 06/18/20 #1, left lower leg cluster, venous, partial thickness, onset 6/1/2020-Wound Width (cm): 0.6 cm  Wound 06/18/20 #2, right lower leg cluster, venous, full thickness, onset 6/1/2020-Wound Width (cm): 17 cm    Wound 07/16/20 #3, left second toe, DFU, godwin 1, onset 5/1/2020-Wound Depth (cm): 0.1 cm  Wound 06/18/20 #1, left lower leg cluster, venous, partial thickness, onset 6/1/2020-Wound Depth (cm): 0.1 cm  Wound 06/18/20 #2, right lower leg cluster, venous, full thickness, onset 6/1/2020-Wound Depth (cm): 0.3 cm    Assessment:     Patient Active Problem List   Diagnosis Code    Type 2 diabetes mellitus with diabetic polyneuropathy, with long-term current use of insulin (HCC) E11.42, Z79.4    Essential hypertension I10    CLINT (obstructive sleep apnea) G47.33    Tobacco abuse disorder Z72.0    GERD (gastroesophageal reflux disease) K21.9    Anxiety and depression F41.9, F32.9    Hyponatremia E87.1    Iron deficiency anemia D50.9    CAD (coronary artery disease) I25.10    Mitral valve regurgitation I34.0    Stage 3 chronic kidney disease (HCC) N18.3    Claudication in peripheral vascular disease ([facility]) I73.9    COPD (chronic obstructive pulmonary disease) ([facility]) J44.9    Vitamin D deficiency E55.9    Acute on chronic systolic congestive heart failure (HCC) I50.23    Dyslipidemia E78.5    Abel's esophagus K22.70    Viral hepatitis C B19.20    Pulmonary nodule R91.1    Class 2 severe obesity due to excess calories with serious comorbidity and body mass index (BMI) of 39.0 to 39.9 in adult ([facility]) E66.01, Z68.39    Bipolar disorder ([facility]) F31.9    Hypotension - chronic I95.9    Pulmonary hypertension ([facility]) I27.20    Bilateral lower extremity edema R60.0    Habitual self-excoriation F42.4    Ulcer of left lower leg, limited to breakdown of skin ([address] L97.921    Ulcer of right leg, limited to breakdown of skin ([address] L97.911    Arthritis M19.90    Cardiomyopathy ([facility]) I42.9    Chronic systolic heart failure ([facility]) I50.22    Acute on chronic renal insufficiency N28.9, N18.9    Peripheral venous insufficiency I87.2    Diabetic polyneuropathy associated with type 2 diabetes mellitus ([facility]) E11.42    Atherosclerosis of native artery of both lower extremities with bilateral ulceration of calves ([facility]) I70.232, I70.242       Assessment of today's active condition(s): complex medical condition with cardiomyopathy, fluid overload, a degree of CKD, morbid obesity, PAD, ongoing smoking, DM, neuropathy, and nonhealing ulcers; very hard for us to make much rapid progress here while she's still smoking and while her edema is so uncontrolled; can't compress much more aggressively than we are due to pain, and also due to risk of pulmonary edema. No active infection at least. Small right foot callus/ulcer could respond quickly to careful local measures.  Factors contributing to occurrence and/or persistence of the chronic ulcer include edema, venous stasis, diabetes, decreased mobility, shear force, obesity, smoking, arterial insufficiency, decreased tissue oxygenation and non-adherence. Medical necessity of today's visit is shown by the above documentation. Sharp debridement is indicated today, based upon the exam findings in the wound(s) above. Procedure note:     Consent obtained. Time out performed per [facility] policy. Anesthetic  Anesthetic: 4% Lidocaine Cream     Using a # 10 blade scalpel, I sharply debrided the right lower leg ulcer(s) down through and including the removal of dermis. The type(s) of tissue debrided included fibrin, biofilm and exudate. Total Surface Area Debrided: approx 20 sq cm. The ulcers were then irrigated with normal saline solution. The procedure was completed with a small amount of bleeding, and hemostasis was with pressure. The patient tolerated the procedure well, with no significant complications. The patient's level of pain during and after the procedure was monitored. Post-debridement measurements, if different from pre-debridement, are in the flowsheet as well. Discharge plan:     Treatment in the wound care center today, per RN documentation: Wound 07/16/20 #3, left second toe, DFU, godwin 1, onset 5/1/2020-Dressing/Treatment: (betadine, dry dressing )  Wound 06/18/20 #1, left lower leg cluster, venous, partial thickness, onset 6/1/2020-Dressing/Treatment: (adaptic,pso, gauze,cast pad, ace, spandagrip)  Wound 06/18/20 #2, right lower leg cluster, venous, full thickness, onset 6/1/2020-Dressing/Treatment: Other (comment)(PSO/Triad,4x4's, kerlix, surepress). Keep left leg dressing and wrap in place for the week. For the right 5th met head, I placed a bit of offloading felt around that callus, and asked her just to use a bit of mupirocin and a dry dressing daily. Continue Betadine and a dry dressing to the left 2nd toe daily.     I reminded the patient of the importance of weight management and smoking cessation, if applicable; also encouraged ambulation as tolerated, additional lower extremity exercises as instructed in our education sheet, leg elevation when at rest, and compliance with any recommended dietary, diuretic and compression therapies. Home treatment: good handwashing before and after any dressing changes. Cleanse wound with saline or soap & water before dressing change. May use Vaseline (petrolatum), Aquaphor, Aveeno, CeraVe, Cetaphil, Eucerin, Lubriderm, etc for dry skin. Dressing type for home: as above for the right leg, trying to keep the wound beds a bit moist, once daily. Written discharge instructions given to patient. Follow up in 1 week.     Electronically signed by Idalmis Allen MD on 7/28/2020 at 9:15 PM.

## 2020-07-30 ENCOUNTER — OFFICE VISIT (OUTPATIENT)
Dept: PRIMARY CARE CLINIC | Age: 55
End: 2020-07-30
Payer: COMMERCIAL

## 2020-07-30 ENCOUNTER — HOSPITAL ENCOUNTER (OUTPATIENT)
Dept: WOUND CARE | Age: 55
Discharge: HOME | End: 2020-07-30
Payer: COMMERCIAL

## 2020-07-30 VITALS
HEART RATE: 112 BPM | SYSTOLIC BLOOD PRESSURE: 118 MMHG | RESPIRATION RATE: 20 BRPM | HEIGHT: 64 IN | BODY MASS INDEX: 41.48 KG/M2 | DIASTOLIC BLOOD PRESSURE: 72 MMHG | TEMPERATURE: 98.5 F | WEIGHT: 243 LBS

## 2020-07-30 PROCEDURE — 99211 OFF/OP EST MAY X REQ PHY/QHP: CPT | Performed by: NURSE PRACTITIONER

## 2020-07-30 PROCEDURE — G8428 CUR MEDS NOT DOCUMENT: HCPCS | Performed by: NURSE PRACTITIONER

## 2020-07-30 PROCEDURE — G8417 CALC BMI ABV UP PARAM F/U: HCPCS | Performed by: NURSE PRACTITIONER

## 2020-07-30 PROCEDURE — 97597 DBRDMT OPN WND 1ST 20 CM/<: CPT

## 2020-07-30 PROCEDURE — 97597 DBRDMT OPN WND 1ST 20 CM/<: CPT | Performed by: INTERNAL MEDICINE

## 2020-07-30 RX ORDER — LIDOCAINE 50 MG/G
OINTMENT TOPICAL ONCE
Status: DISCONTINUED | OUTPATIENT
Start: 2020-07-30 | End: 2020-07-31 | Stop reason: HOSPADM

## 2020-07-30 RX ORDER — CLINDAMYCIN HYDROCHLORIDE 300 MG/1
300 CAPSULE ORAL 3 TIMES DAILY
Status: ON HOLD | COMMUNITY
End: 2020-08-12 | Stop reason: HOSPADM

## 2020-07-30 NOTE — PROGRESS NOTES
[address] [place] [address] received a viral test for COVID-19. They were educated on isolation and quarantine as appropriate. For any symptoms, they were directed to seek care from their PCP, given contact information to establish with a doctor, directed to an urgent care or the emergency room.

## 2020-08-01 LAB — SARS-COV-2, NAA: NOT DETECTED

## 2020-08-03 ENCOUNTER — TELEPHONE (OUTPATIENT)
Dept: VASCULAR SURGERY | Age: 55
End: 2020-08-03

## 2020-08-03 PROBLEM — L97.521 DIABETIC ULCER OF LEFT FOOT ASSOCIATED WITH TYPE 2 DIABETES MELLITUS, LIMITED TO BREAKDOWN OF SKIN (HCC): Status: ACTIVE | Noted: 2020-01-18

## 2020-08-03 NOTE — PROGRESS NOTES
[facility] Progress Note    Carline Bautista     : 1965    DATE OF VISIT:  2020    Subjective:     Rosie Wilde is a 62 y.o. female who has a venous ulcer located on the bilateral lower leg. Significant symptoms or pertinent wound history since last visit: weight is doing better this week, I think not quite as much leg swelling, less exertional SOB also. No F/C/D. LLE wrap was removed once for swimming, then she rewrapped as best she could. Drainage fairly modest overall. Additional ulcer(s) noted? yes. Left lateral forefoot diabetic ulcer, new, looks like it opened up from a friction blister. And at the plantar aspect of her right 5th met head, where she had a bit of callus and a pinpoint ulcer last week, she said pain increased this week, she saw her DPM just yesterday, said that she drained an abscess and put her on oral Abx, but there is no open ulcer and there are no signs of inflammation at all there now. Her current medication list consists of ARIPiprazole, Doxepin HCl, INSULIN SYRINGE .5CC/29G, Insulin Degludec, Insulin Pen Needle, Liraglutide, albuterol sulfate HFA, aspirin EC, atorvastatin, benztropine, blood glucose test strips, buprenorphine-naloxone, busPIRone, clindamycin, clopidogrel, insulin lispro (1 Unit Dial), lamoTRIgine, magnesium oxide, metOLazone, midodrine, pantoprazole, potassium chloride, spironolactone, tiotropium, torsemide, and traZODone. Clindamycin just started yesterday.     Allergies: Lisinopril    Objective:     Vitals:    20 1256   BP: 118/72   Pulse: 112   Resp: 20   Temp: 98.5 °F (36.9 °C)   TempSrc: Oral   Weight: 243 lb (110.2 kg)   Height: 5' 4\" (1.626 m)     AAOx3, overweight, fatigued, does not look as dyspneic, NAD overall  Dopplerable distal pulses, feet mostly warm, some toes cool, slow cap refill  Moderate BL LE edema overall, but I do see some improvement at least on the left  No cellulitis, angitis, fluctuance, still a bit of allodynia around the right leg ulcers  Shaye-ulcer skin: pink, warm, indurated, dry and hyperkeratotic on the lower legs; mildly macerated hyperkeratosis / blister skin at the left 5th met head (lateral). Ulcer(s): left lesser toe healed (dry, crusted area); right 5th met head healed (minor callus); left lateral forefoot small, partial thickness, pink-red, smooth tissue, a bit of loose biofilm; left leg ulcer small, partial thickness, clean, pink; right lower extremity cluster very slowly getting smaller, a bit more granular, less fibrotic, still fibrin and biofilm each week. Photos also saved in electronic chart.     Today's wound measurements, per RN documentation:  Wound 07/16/20 #3, left second toe, DFU, scott 1, onset 5/1/2020-Wound Length (cm): 0 cm  Wound 06/18/20 #1, left lower leg cluster, venous, partial thickness, onset 6/1/2020-Wound Length (cm): 0.5 cm  Wound 06/18/20 #2, right lower leg cluster, venous, full thickness, onset 6/1/2020-Wound Length (cm): 7.5 cm  Wound 07/30/20 #4, Left lateral foot, diabetic foot ulcer, Scott 1, Onset 7/25/20-Wound Length (cm): 1 cm    Wound 07/16/20 #3, left second toe, DFU, scott 1, onset 5/1/2020-Wound Width (cm): 0 cm  Wound 06/18/20 #1, left lower leg cluster, venous, partial thickness, onset 6/1/2020-Wound Width (cm): 0.3 cm  Wound 06/18/20 #2, right lower leg cluster, venous, full thickness, onset 6/1/2020-Wound Width (cm): 16 cm  Wound 07/30/20 #4, Left lateral foot, diabetic foot ulcer, Scott 1, Onset 7/25/20-Wound Width (cm): 1 cm    Wound 07/16/20 #3, left second toe, DFU, scott 1, onset 5/1/2020-Wound Depth (cm): 0 cm  Wound 06/18/20 #1, left lower leg cluster, venous, partial thickness, onset 6/1/2020-Wound Depth (cm): 0.1 cm  Wound 06/18/20 #2, right lower leg cluster, venous, full thickness, onset 6/1/2020-Wound Depth (cm): 0.2 cm  Wound 07/30/20 #4, Left lateral foot, diabetic foot ulcer, Scott 1, Onset 7/25/20-Wound Depth (cm): 0.2 progress (need to compress gently to avoid pedal ischemia and CHF); right foot healed, left 2nd toe healed, left forefoot newly open, no signs of active infection, no signs of worsening ischemia. Factors contributing to occurrence and/or persistence of the chronic ulcer include edema, venous stasis, diabetes, decreased mobility, shear force, obesity, smoking, arterial insufficiency, decreased tissue oxygenation and non-adherence. Medical necessity of today's visit is shown by the above documentation. Sharp debridement is indicated today, based upon the exam findings in the wound(s) above. Procedure note:     Consent obtained. [facility] Anesthetic  Anesthetic: 4% Lidocaine Cream     Using a curette, I sharply debrided the right lower leg ulcer(s) down through and including the removal of dermis. The type(s) of tissue debrided included fibrin, biofilm and exudate. Total Surface Area Debrided: approx 20 sq cm. The ulcers were then irrigated with normal saline solution. The procedure was completed with a small amount of bleeding, and hemostasis was with pressure. The patient tolerated the procedure well, with no significant complications. The patient's level of pain during and after the procedure was monitored. Post-debridement measurements, if different from pre-debridement, are in the flowsheet as well.     Discharge plan:     Treatment in the wound care center today, per RN documentation: Wound 07/16/20 #3, left second toe, DFU, godwin 1, onset 5/1/2020-Dressing/Treatment: (betadine, dry dressing )  Wound 06/18/20 #1, left lower leg cluster, venous, partial thickness, onset 6/1/2020-Dressing/Treatment: (pso,adaptic,4x4,castpad,ace)  Wound 06/18/20 #2, right lower leg cluster, venous, full thickness, onset 6/1/2020-Dressing/Treatment: (PSO,triad,gauze,satya,surepress,spandagrip -- PSO >> Triad for now, unless drainage increases)  Wound 07/30/20 #4, Left lateral foot, diabetic foot ulcer, Scott 1, Onset 7/25/20-Dressing/Treatment: (PSO, dry dressing ). Leave left leg dressing and light compression wrap in place for the week. Abx per Dr. Neftali Chand, though I don't see any signs of cellulitis or residual abscess today. I reminded the patient of the importance of weight management and smoking cessation, if applicable; also encouraged ambulation as tolerated, additional lower extremity exercises as instructed in our education sheet, leg elevation when at rest, and compliance with any recommended dietary, diuretic and compression therapies. Home treatment: good handwashing before and after any dressing changes. Cleanse wound with saline or soap & water before dressing change. May use Vaseline (petrolatum), Aquaphor, Aveeno, CeraVe, Cetaphil, Eucerin, Lubriderm, etc for dry skin. Dressing type for home: as above for the left foot, left lesser toe, right leg, once daily. Written discharge instructions given to patient. Follow up in 1 week.     Electronically signed by Shanique Abdi MD on 8/3/2020 at 1:34 PM.

## 2020-08-03 NOTE — TELEPHONE ENCOUNTER
Called patient as reminder of procedure tomorrow at [place]. Arrive at 6:00am. Npo after midnight.  aimee

## 2020-08-04 ENCOUNTER — HOSPITAL ENCOUNTER (INPATIENT)
Age: 55
LOS: 8 days | Discharge: HOME | End: 2020-08-12
Attending: EMERGENCY MEDICINE | Admitting: INTERNAL MEDICINE
Payer: COMMERCIAL

## 2020-08-04 ENCOUNTER — HOSPITAL ENCOUNTER (OUTPATIENT)
Dept: CARDIAC CATH/INVASIVE PROCEDURES | Age: 55
Discharge: HOME | End: 2020-08-04
Attending: SURGERY | Admitting: SURGERY
Payer: COMMERCIAL

## 2020-08-04 ENCOUNTER — APPOINTMENT (OUTPATIENT)
Dept: CT IMAGING | Age: 55
End: 2020-08-04
Payer: COMMERCIAL

## 2020-08-04 VITALS — BODY MASS INDEX: 41.83 KG/M2 | WEIGHT: 245 LBS | HEIGHT: 64 IN

## 2020-08-04 PROBLEM — Z95.1 AORTOCORONARY BYPASS STATUS: Chronic | Status: ACTIVE | Noted: 2020-08-04

## 2020-08-04 PROBLEM — S37.019A PERINEPHRIC HEMATOMA: Status: ACTIVE | Noted: 2020-08-04

## 2020-08-04 LAB
A/G RATIO: 1 (ref 1.1–2.2)
ABO/RH: NORMAL
ALBUMIN SERPL-MCNC: 3.3 G/DL (ref 3.4–5)
ALP BLD-CCNC: 146 U/L (ref 40–129)
ALT SERPL-CCNC: 10 U/L (ref 10–40)
AMORPHOUS: NORMAL /HPF
ANION GAP SERPL CALCULATED.3IONS-SCNC: 14 MMOL/L (ref 3–16)
ANION GAP SERPL CALCULATED.3IONS-SCNC: 15 MMOL/L (ref 3–16)
ANTIBODY SCREEN: NORMAL
AST SERPL-CCNC: 13 U/L (ref 15–37)
BASOPHILS ABSOLUTE: 0 K/UL (ref 0–0.2)
BASOPHILS RELATIVE PERCENT: 0.2 %
BILIRUB SERPL-MCNC: 1.4 MG/DL (ref 0–1)
BILIRUBIN URINE: NEGATIVE
BLOOD, URINE: ABNORMAL
BUN BLDV-MCNC: 49 MG/DL (ref 7–20)
BUN BLDV-MCNC: 50 MG/DL (ref 7–20)
CALCIUM SERPL-MCNC: 8.9 MG/DL (ref 8.3–10.6)
CALCIUM SERPL-MCNC: 9.5 MG/DL (ref 8.3–10.6)
CHLORIDE BLD-SCNC: 75 MMOL/L (ref 99–110)
CHLORIDE BLD-SCNC: 76 MMOL/L (ref 99–110)
CLARITY: CLEAR
CO2: 31 MMOL/L (ref 21–32)
CO2: 35 MMOL/L (ref 21–32)
COLOR: ABNORMAL
CREAT SERPL-MCNC: 1.2 MG/DL (ref 0.6–1.1)
CREAT SERPL-MCNC: 1.2 MG/DL (ref 0.6–1.1)
EOSINOPHILS ABSOLUTE: 0 K/UL (ref 0–0.6)
EOSINOPHILS RELATIVE PERCENT: 0.2 %
FERRITIN: 37 NG/ML (ref 15–150)
GFR AFRICAN AMERICAN: 56
GFR AFRICAN AMERICAN: 56
GFR NON-AFRICAN AMERICAN: 46
GFR NON-AFRICAN AMERICAN: 46
GLOBULIN: 3.3 G/DL
GLUCOSE BLD-MCNC: 195 MG/DL (ref 70–99)
GLUCOSE BLD-MCNC: 251 MG/DL (ref 70–99)
GLUCOSE BLD-MCNC: 294 MG/DL (ref 70–99)
GLUCOSE BLD-MCNC: 329 MG/DL (ref 70–99)
GLUCOSE URINE: NEGATIVE MG/DL
HCT VFR BLD CALC: 30 % (ref 36–48)
HCT VFR BLD CALC: 35.3 % (ref 36–48)
HEMOGLOBIN: 11.2 G/DL (ref 12–16)
HEMOGLOBIN: 9.5 G/DL (ref 12–16)
IMMATURE RETIC FRACT: 0.57 (ref 0.21–0.37)
INR BLD: 1.21 (ref 0.86–1.14)
IRON SATURATION: 10 % (ref 15–50)
IRON: 42 UG/DL (ref 37–145)
KETONES, URINE: NEGATIVE MG/DL
LACTATE DEHYDROGENASE: 261 U/L (ref 100–190)
LEUKOCYTE ESTERASE, URINE: NEGATIVE
LIPASE: 38 U/L (ref 13–60)
LYMPHOCYTES ABSOLUTE: 0.4 K/UL (ref 1–5.1)
LYMPHOCYTES RELATIVE PERCENT: 2.7 %
MAGNESIUM: 1.7 MG/DL (ref 1.8–2.4)
MCH RBC QN AUTO: 23.7 PG (ref 26–34)
MCH RBC QN AUTO: 23.8 PG (ref 26–34)
MCHC RBC AUTO-ENTMCNC: 31.8 G/DL (ref 31–36)
MCHC RBC AUTO-ENTMCNC: 31.8 G/DL (ref 31–36)
MCV RBC AUTO: 74.6 FL (ref 80–100)
MCV RBC AUTO: 74.8 FL (ref 80–100)
MICROSCOPIC EXAMINATION: YES
MONOCYTES ABSOLUTE: 0.8 K/UL (ref 0–1.3)
MONOCYTES RELATIVE PERCENT: 5.3 %
NEUTROPHILS ABSOLUTE: 14.3 K/UL (ref 1.7–7.7)
NEUTROPHILS RELATIVE PERCENT: 91.6 %
NITRITE, URINE: NEGATIVE
PDW BLD-RTO: 20.2 % (ref 12.4–15.4)
PDW BLD-RTO: 20.6 % (ref 12.4–15.4)
PERFORMED ON: ABNORMAL
PERFORMED ON: ABNORMAL
PH UA: 7.5 (ref 5–8)
PLATELET # BLD: 349 K/UL (ref 135–450)
PLATELET # BLD: 387 K/UL (ref 135–450)
PMV BLD AUTO: 8.5 FL (ref 5–10.5)
PMV BLD AUTO: 8.9 FL (ref 5–10.5)
POTASSIUM SERPL-SCNC: 2.4 MMOL/L (ref 3.5–5.1)
POTASSIUM SERPL-SCNC: 3 MMOL/L (ref 3.5–5.1)
PROTEIN UA: NEGATIVE MG/DL
PROTHROMBIN TIME: 14.1 SEC (ref 10–13.2)
RBC # BLD: 4 M/UL (ref 4–5.2)
RBC # BLD: 4.73 M/UL (ref 4–5.2)
RBC UA: NORMAL /HPF (ref 0–4)
RETICULOCYTE ABSOLUTE COUNT: 0.13 M/UL (ref 0.02–0.1)
RETICULOCYTE COUNT PCT: 3.12 % (ref 0.5–2.18)
SODIUM BLD-SCNC: 122 MMOL/L (ref 136–145)
SODIUM BLD-SCNC: 124 MMOL/L (ref 136–145)
SPECIFIC GRAVITY UA: 1.01 (ref 1–1.03)
TOTAL IRON BINDING CAPACITY: 429 UG/DL (ref 260–445)
TOTAL PROTEIN: 6.6 G/DL (ref 6.4–8.2)
TSH SERPL DL<=0.05 MIU/L-ACNC: 1.47 UIU/ML (ref 0.27–4.2)
URINE REFLEX TO CULTURE: ABNORMAL
URINE TYPE: ABNORMAL
UROBILINOGEN, URINE: 0.2 E.U./DL
WBC # BLD: 15.6 K/UL (ref 4–11)
WBC # BLD: 8.5 K/UL (ref 4–11)
WBC UA: NORMAL /HPF (ref 0–5)

## 2020-08-04 PROCEDURE — 2580000003 HC RX 258

## 2020-08-04 PROCEDURE — 99152 MOD SED SAME PHYS/QHP 5/>YRS: CPT | Performed by: SURGERY

## 2020-08-04 PROCEDURE — 6360000002 HC RX W HCPCS: Performed by: NURSE PRACTITIONER

## 2020-08-04 PROCEDURE — 2500000003 HC RX 250 WO HCPCS

## 2020-08-04 PROCEDURE — 6370000000 HC RX 637 (ALT 250 FOR IP)

## 2020-08-04 PROCEDURE — 37236 OPEN/PERQ PLACE STENT 1ST: CPT | Performed by: SURGERY

## 2020-08-04 PROCEDURE — 86850 RBC ANTIBODY SCREEN: CPT

## 2020-08-04 PROCEDURE — 2709999900 HC NON-CHARGEABLE SUPPLY

## 2020-08-04 PROCEDURE — C1894 INTRO/SHEATH, NON-LASER: HCPCS

## 2020-08-04 PROCEDURE — 85027 COMPLETE CBC AUTOMATED: CPT

## 2020-08-04 PROCEDURE — 82607 VITAMIN B-12: CPT

## 2020-08-04 PROCEDURE — C1769 GUIDE WIRE: HCPCS

## 2020-08-04 PROCEDURE — 2580000003 HC RX 258: Performed by: NURSE PRACTITIONER

## 2020-08-04 PROCEDURE — 6360000004 HC RX CONTRAST MEDICATION: Performed by: NURSE PRACTITIONER

## 2020-08-04 PROCEDURE — C1760 CLOSURE DEV, VASC: HCPCS

## 2020-08-04 PROCEDURE — 6360000002 HC RX W HCPCS: Performed by: INTERNAL MEDICINE

## 2020-08-04 PROCEDURE — 82728 ASSAY OF FERRITIN: CPT

## 2020-08-04 PROCEDURE — 80048 BASIC METABOLIC PNL TOTAL CA: CPT

## 2020-08-04 PROCEDURE — 6370000000 HC RX 637 (ALT 250 FOR IP): Performed by: INTERNAL MEDICINE

## 2020-08-04 PROCEDURE — 81001 URINALYSIS AUTO W/SCOPE: CPT

## 2020-08-04 PROCEDURE — 36252 INS CATH REN ART 1ST BILAT: CPT | Performed by: SURGERY

## 2020-08-04 PROCEDURE — 86901 BLOOD TYPING SEROLOGIC RH(D): CPT

## 2020-08-04 PROCEDURE — 6360000002 HC RX W HCPCS

## 2020-08-04 PROCEDURE — 85025 COMPLETE CBC W/AUTO DIFF WBC: CPT

## 2020-08-04 PROCEDURE — 80053 COMPREHEN METABOLIC PANEL: CPT

## 2020-08-04 PROCEDURE — 36252 INS CATH REN ART 1ST BILAT: CPT

## 2020-08-04 PROCEDURE — 83540 ASSAY OF IRON: CPT

## 2020-08-04 PROCEDURE — 6360000002 HC RX W HCPCS: Performed by: EMERGENCY MEDICINE

## 2020-08-04 PROCEDURE — C1876 STENT, NON-COA/NON-COV W/DEL: HCPCS

## 2020-08-04 PROCEDURE — 84132 ASSAY OF SERUM POTASSIUM: CPT

## 2020-08-04 PROCEDURE — 99153 MOD SED SAME PHYS/QHP EA: CPT

## 2020-08-04 PROCEDURE — 99152 MOD SED SAME PHYS/QHP 5/>YRS: CPT

## 2020-08-04 PROCEDURE — 2000000000 HC ICU R&B

## 2020-08-04 PROCEDURE — 86900 BLOOD TYPING SEROLOGIC ABO: CPT

## 2020-08-04 PROCEDURE — 96375 TX/PRO/DX INJ NEW DRUG ADDON: CPT

## 2020-08-04 PROCEDURE — 85014 HEMATOCRIT: CPT

## 2020-08-04 PROCEDURE — 36415 COLL VENOUS BLD VENIPUNCTURE: CPT

## 2020-08-04 PROCEDURE — P9016 RBC LEUKOCYTES REDUCED: HCPCS

## 2020-08-04 PROCEDURE — B4171ZZ FLUOROSCOPY OF LEFT RENAL ARTERY USING LOW OSMOLAR CONTRAST: ICD-10-PCS | Performed by: SURGERY

## 2020-08-04 PROCEDURE — 84443 ASSAY THYROID STIM HORMONE: CPT

## 2020-08-04 PROCEDURE — 83735 ASSAY OF MAGNESIUM: CPT

## 2020-08-04 PROCEDURE — 83690 ASSAY OF LIPASE: CPT

## 2020-08-04 PROCEDURE — 93005 ELECTROCARDIOGRAM TRACING: CPT | Performed by: NURSE PRACTITIONER

## 2020-08-04 PROCEDURE — C1887 CATHETER, GUIDING: HCPCS

## 2020-08-04 PROCEDURE — 37236 OPEN/PERQ PLACE STENT 1ST: CPT

## 2020-08-04 PROCEDURE — 96376 TX/PRO/DX INJ SAME DRUG ADON: CPT

## 2020-08-04 PROCEDURE — B41F1ZZ FLUOROSCOPY OF RIGHT LOWER EXTREMITY ARTERIES USING LOW OSMOLAR CONTRAST: ICD-10-PCS | Performed by: SURGERY

## 2020-08-04 PROCEDURE — 85045 AUTOMATED RETICULOCYTE COUNT: CPT

## 2020-08-04 PROCEDURE — 96365 THER/PROPH/DIAG IV INF INIT: CPT

## 2020-08-04 PROCEDURE — 85018 HEMOGLOBIN: CPT

## 2020-08-04 PROCEDURE — 76937 US GUIDE VASCULAR ACCESS: CPT | Performed by: SURGERY

## 2020-08-04 PROCEDURE — 83615 LACTATE (LD) (LDH) ENZYME: CPT

## 2020-08-04 PROCEDURE — 99285 EMERGENCY DEPT VISIT HI MDM: CPT

## 2020-08-04 PROCEDURE — 86923 COMPATIBILITY TEST ELECTRIC: CPT

## 2020-08-04 PROCEDURE — 85610 PROTHROMBIN TIME: CPT

## 2020-08-04 PROCEDURE — 74177 CT ABD & PELVIS W/CONTRAST: CPT

## 2020-08-04 PROCEDURE — 82746 ASSAY OF FOLIC ACID SERUM: CPT

## 2020-08-04 PROCEDURE — 6370000000 HC RX 637 (ALT 250 FOR IP): Performed by: SURGERY

## 2020-08-04 PROCEDURE — 83550 IRON BINDING TEST: CPT

## 2020-08-04 PROCEDURE — 047A3DZ DILATION OF LEFT RENAL ARTERY WITH INTRALUMINAL DEVICE, PERCUTANEOUS APPROACH: ICD-10-PCS | Performed by: SURGERY

## 2020-08-04 RX ORDER — DEXTROSE MONOHYDRATE 25 G/50ML
12.5 INJECTION, SOLUTION INTRAVENOUS PRN
Status: DISCONTINUED | OUTPATIENT
Start: 2020-08-04 | End: 2020-08-12 | Stop reason: HOSPADM

## 2020-08-04 RX ORDER — MAGNESIUM SULFATE 1 G/100ML
1 INJECTION INTRAVENOUS PRN
Status: DISCONTINUED | OUTPATIENT
Start: 2020-08-04 | End: 2020-08-10

## 2020-08-04 RX ORDER — DEXTROSE MONOHYDRATE 50 MG/ML
100 INJECTION, SOLUTION INTRAVENOUS PRN
Status: DISCONTINUED | OUTPATIENT
Start: 2020-08-04 | End: 2020-08-12 | Stop reason: HOSPADM

## 2020-08-04 RX ORDER — INSULIN GLARGINE 100 [IU]/ML
24 INJECTION, SOLUTION SUBCUTANEOUS NIGHTLY
Status: DISCONTINUED | OUTPATIENT
Start: 2020-08-04 | End: 2020-08-09

## 2020-08-04 RX ORDER — ATORVASTATIN CALCIUM 80 MG/1
80 TABLET, FILM COATED ORAL NIGHTLY
Status: DISCONTINUED | OUTPATIENT
Start: 2020-08-04 | End: 2020-08-12 | Stop reason: HOSPADM

## 2020-08-04 RX ORDER — ASPIRIN 81 MG/1
81 TABLET ORAL DAILY
Status: DISCONTINUED | OUTPATIENT
Start: 2020-08-06 | End: 2020-08-12 | Stop reason: HOSPADM

## 2020-08-04 RX ORDER — SODIUM CHLORIDE 9 MG/ML
INJECTION, SOLUTION INTRAVENOUS
Status: COMPLETED
Start: 2020-08-04 | End: 2020-08-05

## 2020-08-04 RX ORDER — ARIPIPRAZOLE 10 MG/1
10 TABLET ORAL DAILY
Status: DISCONTINUED | OUTPATIENT
Start: 2020-08-05 | End: 2020-08-12 | Stop reason: HOSPADM

## 2020-08-04 RX ORDER — MORPHINE SULFATE 4 MG/ML
4 INJECTION, SOLUTION INTRAMUSCULAR; INTRAVENOUS EVERY 30 MIN PRN
Status: COMPLETED | OUTPATIENT
Start: 2020-08-04 | End: 2020-08-04

## 2020-08-04 RX ORDER — BUSPIRONE HYDROCHLORIDE 5 MG/1
30 TABLET ORAL 2 TIMES DAILY
Status: DISCONTINUED | OUTPATIENT
Start: 2020-08-04 | End: 2020-08-12 | Stop reason: HOSPADM

## 2020-08-04 RX ORDER — TRAZODONE HYDROCHLORIDE 50 MG/1
100 TABLET ORAL NIGHTLY PRN
Status: DISCONTINUED | OUTPATIENT
Start: 2020-08-04 | End: 2020-08-12 | Stop reason: HOSPADM

## 2020-08-04 RX ORDER — SODIUM CHLORIDE 0.9 % (FLUSH) 0.9 %
10 SYRINGE (ML) INJECTION PRN
Status: DISCONTINUED | OUTPATIENT
Start: 2020-08-04 | End: 2020-08-12 | Stop reason: HOSPADM

## 2020-08-04 RX ORDER — CLOPIDOGREL BISULFATE 75 MG/1
75 TABLET ORAL ONCE
Status: COMPLETED | OUTPATIENT
Start: 2020-08-04 | End: 2020-08-04

## 2020-08-04 RX ORDER — POTASSIUM CHLORIDE 20 MEQ/1
40 TABLET, EXTENDED RELEASE ORAL PRN
Status: DISCONTINUED | OUTPATIENT
Start: 2020-08-04 | End: 2020-08-10

## 2020-08-04 RX ORDER — HEPARIN SODIUM 1000 [USP'U]/ML
INJECTION, SOLUTION INTRAVENOUS; SUBCUTANEOUS
Status: COMPLETED | OUTPATIENT
Start: 2020-08-04 | End: 2020-08-04

## 2020-08-04 RX ORDER — ASPIRIN 81 MG/1
81 TABLET ORAL ONCE
Status: COMPLETED | OUTPATIENT
Start: 2020-08-04 | End: 2020-08-04

## 2020-08-04 RX ORDER — SODIUM CHLORIDE 9 MG/ML
INJECTION, SOLUTION INTRAVENOUS CONTINUOUS
Status: DISCONTINUED | OUTPATIENT
Start: 2020-08-04 | End: 2020-08-04 | Stop reason: HOSPADM

## 2020-08-04 RX ORDER — POTASSIUM CHLORIDE 20 MEQ/1
40 TABLET, EXTENDED RELEASE ORAL ONCE
Status: COMPLETED | OUTPATIENT
Start: 2020-08-04 | End: 2020-08-04

## 2020-08-04 RX ORDER — 0.9 % SODIUM CHLORIDE 0.9 %
1000 INTRAVENOUS SOLUTION INTRAVENOUS ONCE
Status: COMPLETED | OUTPATIENT
Start: 2020-08-04 | End: 2020-08-05

## 2020-08-04 RX ORDER — POTASSIUM CHLORIDE 7.45 MG/ML
10 INJECTION INTRAVENOUS PRN
Status: DISCONTINUED | OUTPATIENT
Start: 2020-08-04 | End: 2020-08-10

## 2020-08-04 RX ORDER — SODIUM CHLORIDE 9 MG/ML
1000 INJECTION, SOLUTION INTRAVENOUS ONCE
Status: DISCONTINUED | OUTPATIENT
Start: 2020-08-04 | End: 2020-08-04

## 2020-08-04 RX ORDER — NICOTINE POLACRILEX 4 MG
15 LOZENGE BUCCAL PRN
Status: DISCONTINUED | OUTPATIENT
Start: 2020-08-04 | End: 2020-08-12 | Stop reason: HOSPADM

## 2020-08-04 RX ORDER — BENZTROPINE MESYLATE 1 MG/1
1 TABLET ORAL NIGHTLY
Status: DISCONTINUED | OUTPATIENT
Start: 2020-08-04 | End: 2020-08-12 | Stop reason: HOSPADM

## 2020-08-04 RX ORDER — PANTOPRAZOLE SODIUM 40 MG/1
40 TABLET, DELAYED RELEASE ORAL 2 TIMES DAILY
Status: DISCONTINUED | OUTPATIENT
Start: 2020-08-05 | End: 2020-08-12 | Stop reason: HOSPADM

## 2020-08-04 RX ORDER — MAGNESIUM SULFATE 1 G/100ML
1 INJECTION INTRAVENOUS ONCE
Status: COMPLETED | OUTPATIENT
Start: 2020-08-04 | End: 2020-08-04

## 2020-08-04 RX ORDER — FENTANYL CITRATE 50 UG/ML
INJECTION, SOLUTION INTRAMUSCULAR; INTRAVENOUS
Status: COMPLETED | OUTPATIENT
Start: 2020-08-04 | End: 2020-08-04

## 2020-08-04 RX ORDER — POTASSIUM CHLORIDE 20 MEQ/1
40 TABLET, EXTENDED RELEASE ORAL 2 TIMES DAILY
Status: DISCONTINUED | OUTPATIENT
Start: 2020-08-04 | End: 2020-08-06

## 2020-08-04 RX ORDER — MORPHINE SULFATE 4 MG/ML
4 INJECTION, SOLUTION INTRAMUSCULAR; INTRAVENOUS ONCE
Status: COMPLETED | OUTPATIENT
Start: 2020-08-04 | End: 2020-08-04

## 2020-08-04 RX ORDER — CLOPIDOGREL BISULFATE 75 MG/1
75 TABLET ORAL DAILY
Status: DISCONTINUED | OUTPATIENT
Start: 2020-08-06 | End: 2020-08-12 | Stop reason: HOSPADM

## 2020-08-04 RX ORDER — FENTANYL CITRATE 50 UG/ML
50 INJECTION, SOLUTION INTRAMUSCULAR; INTRAVENOUS ONCE
Status: COMPLETED | OUTPATIENT
Start: 2020-08-04 | End: 2020-08-04

## 2020-08-04 RX ORDER — MIDODRINE HYDROCHLORIDE 5 MG/1
7.5 TABLET ORAL 3 TIMES DAILY
Status: DISCONTINUED | OUTPATIENT
Start: 2020-08-05 | End: 2020-08-08

## 2020-08-04 RX ORDER — LAMOTRIGINE 100 MG/1
200 TABLET ORAL 2 TIMES DAILY
Status: DISCONTINUED | OUTPATIENT
Start: 2020-08-04 | End: 2020-08-12 | Stop reason: HOSPADM

## 2020-08-04 RX ORDER — MIDAZOLAM HYDROCHLORIDE 5 MG/ML
INJECTION INTRAMUSCULAR; INTRAVENOUS
Status: COMPLETED | OUTPATIENT
Start: 2020-08-04 | End: 2020-08-04

## 2020-08-04 RX ORDER — ONDANSETRON 2 MG/ML
4 INJECTION INTRAMUSCULAR; INTRAVENOUS EVERY 30 MIN PRN
Status: COMPLETED | OUTPATIENT
Start: 2020-08-04 | End: 2020-08-04

## 2020-08-04 RX ORDER — POTASSIUM CHLORIDE 7.45 MG/ML
10 INJECTION INTRAVENOUS ONCE
Status: COMPLETED | OUTPATIENT
Start: 2020-08-04 | End: 2020-08-04

## 2020-08-04 RX ORDER — ONDANSETRON 2 MG/ML
4 INJECTION INTRAMUSCULAR; INTRAVENOUS ONCE
Status: COMPLETED | OUTPATIENT
Start: 2020-08-04 | End: 2020-08-04

## 2020-08-04 RX ADMIN — FENTANYL CITRATE 50 MCG: 50 INJECTION INTRAMUSCULAR; INTRAVENOUS at 18:55

## 2020-08-04 RX ADMIN — MORPHINE SULFATE 4 MG: 4 INJECTION, SOLUTION INTRAMUSCULAR; INTRAVENOUS at 17:29

## 2020-08-04 RX ADMIN — INSULIN GLARGINE 24 UNITS: 100 INJECTION, SOLUTION SUBCUTANEOUS at 22:14

## 2020-08-04 RX ADMIN — MORPHINE SULFATE 4 MG: 4 INJECTION, SOLUTION INTRAMUSCULAR; INTRAVENOUS at 19:41

## 2020-08-04 RX ADMIN — MORPHINE SULFATE 4 MG: 4 INJECTION, SOLUTION INTRAMUSCULAR; INTRAVENOUS at 20:18

## 2020-08-04 RX ADMIN — POTASSIUM CHLORIDE 40 MEQ: 20 TABLET, EXTENDED RELEASE ORAL at 22:37

## 2020-08-04 RX ADMIN — POTASSIUM CHLORIDE 10 MEQ: 7.46 INJECTION, SOLUTION INTRAVENOUS at 19:02

## 2020-08-04 RX ADMIN — ASPIRIN 81 MG: 81 TABLET, COATED ORAL at 09:04

## 2020-08-04 RX ADMIN — POTASSIUM CHLORIDE 40 MEQ: 20 TABLET, EXTENDED RELEASE ORAL at 09:04

## 2020-08-04 RX ADMIN — BUSPIRONE HYDROCHLORIDE 30 MG: 5 TABLET ORAL at 22:23

## 2020-08-04 RX ADMIN — IOPAMIDOL 75 ML: 755 INJECTION, SOLUTION INTRAVENOUS at 17:53

## 2020-08-04 RX ADMIN — SODIUM CHLORIDE 1000 ML: 9 INJECTION, SOLUTION INTRAVENOUS at 19:02

## 2020-08-04 RX ADMIN — FENTANYL CITRATE 50 MCG: 50 INJECTION, SOLUTION INTRAMUSCULAR; INTRAVENOUS at 08:02

## 2020-08-04 RX ADMIN — MORPHINE SULFATE 4 MG: 4 INJECTION, SOLUTION INTRAMUSCULAR; INTRAVENOUS at 18:08

## 2020-08-04 RX ADMIN — SODIUM CHLORIDE: 9 INJECTION, SOLUTION INTRAVENOUS at 07:13

## 2020-08-04 RX ADMIN — ONDANSETRON 4 MG: 2 INJECTION INTRAMUSCULAR; INTRAVENOUS at 17:29

## 2020-08-04 RX ADMIN — INSULIN LISPRO 8 UNITS: 100 INJECTION, SOLUTION INTRAVENOUS; SUBCUTANEOUS at 22:15

## 2020-08-04 RX ADMIN — POTASSIUM CHLORIDE 10 MEQ: 7.46 INJECTION, SOLUTION INTRAVENOUS at 21:40

## 2020-08-04 RX ADMIN — ONDANSETRON 4 MG: 2 INJECTION INTRAMUSCULAR; INTRAVENOUS at 19:41

## 2020-08-04 RX ADMIN — HEPARIN SODIUM 5000 UNITS: 1000 INJECTION, SOLUTION INTRAVENOUS; SUBCUTANEOUS at 08:23

## 2020-08-04 RX ADMIN — MIDAZOLAM HYDROCHLORIDE 1 MG: 5 INJECTION INTRAMUSCULAR; INTRAVENOUS at 08:09

## 2020-08-04 RX ADMIN — LAMOTRIGINE 200 MG: 100 TABLET ORAL at 22:22

## 2020-08-04 RX ADMIN — MAGNESIUM SULFATE HEPTAHYDRATE 1 G: 1 INJECTION, SOLUTION INTRAVENOUS at 20:18

## 2020-08-04 RX ADMIN — CLOPIDOGREL BISULFATE 75 MG: 75 TABLET ORAL at 09:04

## 2020-08-04 RX ADMIN — ONDANSETRON 4 MG: 2 INJECTION INTRAMUSCULAR; INTRAVENOUS at 18:50

## 2020-08-04 RX ADMIN — SODIUM CHLORIDE 1000 ML: 9 INJECTION, SOLUTION INTRAVENOUS at 17:29

## 2020-08-04 RX ADMIN — MIDAZOLAM HYDROCHLORIDE 1 MG: 5 INJECTION INTRAMUSCULAR; INTRAVENOUS at 08:02

## 2020-08-04 ASSESSMENT — ENCOUNTER SYMPTOMS
BACK PAIN: 0
COUGH: 0
VOMITING: 1
DIARRHEA: 0
ABDOMINAL PAIN: 1
NAUSEA: 1
COLOR CHANGE: 0
SHORTNESS OF BREATH: 0
WHEEZING: 0

## 2020-08-04 ASSESSMENT — PAIN DESCRIPTION - PAIN TYPE: TYPE: ACUTE PAIN

## 2020-08-04 ASSESSMENT — PAIN SCALES - GENERAL
PAINLEVEL_OUTOF10: 0
PAINLEVEL_OUTOF10: 10

## 2020-08-04 ASSESSMENT — PAIN DESCRIPTION - ORIENTATION: ORIENTATION: LEFT

## 2020-08-04 ASSESSMENT — PAIN DESCRIPTION - DESCRIPTORS: DESCRIPTORS: PRESSURE

## 2020-08-04 ASSESSMENT — PAIN DESCRIPTION - LOCATION: LOCATION: ABDOMEN

## 2020-08-04 NOTE — PROGRESS NOTES
Vascular    S/P Bilateral renal angio with PTA/Stent Left renal artery at 0730 this am.  Patient was observed for ~3.5hrs post procedure without any c/o. She went home and developed left flank pain. Advised to return to ED. C/o flank pain, No hypotension or tachycardia. Hgb with slight decrease to 9.5. CT with contrast performed and personally reviewed. There is a left perinephric hematoma. Possible small area of extrav. Discussed findings with patient and . Given stable vitals have recommended admit with monitoring in ICU with serial H/H, hopefully allowing tamponade and avoidance of further invasive procedures. Transfuse for Hgb <7.  If hemodynamic instability not responsive to fluid/blood repeat angio with possible embolization will be necessary.

## 2020-08-04 NOTE — H&P
History and Physical / Pre-Sedation Assessment    Patient:  Shawn Paz  :   1965    Intended Procedure: Renal angiogram with possible stenting      HPI: History PAD, CAD, CKD, CHF. Severe difficulties with BP and chronic edema. Prior angio demonstrated significant renal stenosis. Nurses notes reviewed and agreed. Medications reviewed  Allergies:   Lisinopril        Physical Exam:   CURRENT VITALS:  Ht 5' 4\" (1.626 m)   Wt 245 lb (111.1 kg)   LMP 10/24/2012   BMI 42.05 kg/m²   Airway:Normal  Cardiac:Normal  Pulmonary:Normal  Abdomen:Normal        Pre-Procedure Assessment/Plan:  ASA 2 - Patient with mild systemic disease with no functional limitations    Mallampati Airway Assessment:  Mallampati Class III - (soft palate & base of uvula are visible)    Level of Sedation Plan: Moderate sedation    Post Procedure plan: Return to same level of care    I assessed the patient and find that the patient is in satisfactory condition to proceed with the planned procedure and sedation plan. I have explained the risk, benefits, and alternatives to the procedure. The patient understands and agrees to proceed.   Yes    Libia Sena

## 2020-08-04 NOTE — H&P
Hospital Medicine History & Physical      Patient:  Karely Navarro  :   1965  MRN:   [id number]  Date of Service: 20    CHIEF COMPLAINT:  Left flank pain    HISTORY OF PRESENT ILLNESS:    Karely Navarro is a 62 y.o. female. She underwent bilateral renal angiography earlier today by Dr. Tommy Law. The left renal artery was stenotic. Angioplasty and stenting was performed. Patient was observed for ~3.5hrs post procedure and did well. She went home and developed severe left flank pain and so returns to the ER. The pain remains severe. Review of Systems:  All pertinent positives and negatives are as noted in the HPI section. All other systems were reviewed and are negative.     Past Medical History:   Diagnosis Date    Acute kidney injury ([address] 2019    Acute on chronic congestive heart failure (HCC)     Alcohol dependence ([address] 3/10/2010    Bipolar 1 disorder ([address]     CAD (coronary artery disease)     Cardiomyopathy ([address] 2020    EF= 25%    Cellulitis 6/3/2020    CH[facility]congestive [facility] failure) [facility]     COPD (chronic obstructive pulmonary disease) ([facility])     Diabetic ulcer of left foot associated with type 2 diabetes mellitus ([address] 2020    Diabetic ulcer of toe of right foot associated with type 2 diabetes mellitus ([address] 2020    GERD (gastroesophageal reflux disease)     High cholesterol     HTN (hypertension)     Kidney disease, chronic, stage II (mild, EGFR 60+ ml/min)     MI (myocardial infarction) ([address] 10/07/2019    NSTEMI    Positive FIT (fecal immunochemical test) 2020    Pulmonary nodule     PVD (peripheral vascular disease) ([facility])        Past Surgical History:   Procedure Laterality Date    ARTERIAL BYPASS SURGRY Left 2016    Axillary/Subclavian to Brachial Bypass w/reversed SVG    CARDIAC CATHETERIZATION  2018    Dr. Pooja Trinh - at [address]  2020    Dr. Alexandre Antunez CHOLECYSTECTOMY      pancreatitis post surgery    CORONARY ANGIOPLASTY WITH STENT PLACEMENT  03/16/2018    MELODY- 3.5 x 38 and 3.5 x 20 to Cx    CORONARY ANGIOPLASTY WITH STENT PLACEMENT  01/03/2018    MELODY- 3.0 x 38 and 2.75 x 26 and 2.75 x 8 and 2.75 x 22 to the LAD    CORONARY ANGIOPLASTY WITH STENT PLACEMENT  10/07/2019    MELODY- 2.5 x 8 to [address] GRAFT N/A 1/27/2020    CORONARY ARTERY BYPASS GRAFTING X 1, ON PUMP BEATING HEART, INTERNAL MAMMARY ARTERY TO LEFT ANTERIOR DESCENDING ARTERY, VAS CATH INSERTION, URI, PLATELET GEL APPLICATION, 5 LEVEL BILATERAL INTERCOSTAL NERVE BLOCK, STERNAL PLATING performed by Andriy Avelar MD at [address] Right 5/16/2019    fem-pop, performed by Yahaira Banks MD at [address]  02/14/2019    CardioMEMs insertion for CHF    ROTATOR CUFF REPAIR Left 04/22/2016    TONSILLECTOMY      TRANSESOPHAGEAL ECHOCARDIOGRAM  01/26/2020    TRANSLUMINAL ANGIOPLASTY Left 10/08/2019    with stenting, at [facility]    TRANSLUMINAL ANGIOPLASTY Left 04/29/2020    of the [facility] re-occlusion    TUMOR EXCISION      benign tumors X 2 chest & axilla    UPPER GASTROINTESTINAL ENDOSCOPY  4/25/2013    BX barretts, HH, gastritis    UPPER GASTROINTESTINAL ENDOSCOPY  12/10/2015    UPPER GASTROINTESTINAL ENDOSCOPY  01/06/2017    Gastritis    VASCULAR SURGERY Left 12/08/2015    upper extremity and mesenteric angiogram (diagnostic only)    VASCULAR SURGERY Bilateral 07/07/2020    diagnostic lower extremity angiogram only         Prior to Admission medications    Medication Sig Start Date End Date Taking?  Authorizing Provider   clindamycin (CLEOCIN) 300 MG capsule Take 300 mg by mouth 3 times daily    Historical Provider, MD   traZODone (DESYREL) 100 MG tablet Take 100 mg by mouth nightly as needed for Sleep Can take 1-2 tabs nightly    Historical Provider, MD   DOXEPIN HCL PO Take 1 capsule by mouth nightly as needed Patient unsure of exact 12/3/19   TRAY Moctezuma CNP   Liraglutide (VICTOZA) 18 MG/3ML SOPN SC injection Inject 1.2 mg into the skin daily 10/29/19   TRAY Moctezuma CNP   pantoprazole (PROTONIX) 40 MG tablet Take 1 tablet by mouth 2 times daily 10/1/19   Nataliya Rico MD   clopidogrel (PLAVIX) 75 MG tablet TAKE 1 TABLET BY MOUTH EVERY DAY 9/3/19   TRAY Braun CNP   magnesium oxide (MAG-OX) 400 (240 Mg) MG tablet TAKE 1 TABLET ONCE DAILY 5/1/19   Luisito Cordero MD   busPIRone (BUSPAR) 30 MG tablet Take 30 mg by mouth 2 times daily  4/2/18   Historical Provider, MD   aspirin EC 81 MG EC tablet Take 1 tablet by mouth daily 1/8/16   Betty Ruffin MD   ARIPiprazole (ABILIFY) 10 MG tablet Take 10 mg by mouth daily     Historical Provider, MD   lamoTRIgine (LAMICTAL) 150 MG tablet Take 200 mg by mouth 2 times daily     Historical Provider, MD       Allergies:   Lisinopril    Social:   reports that she has been smoking cigarettes. She has a 30.00 pack-year smoking history. She has never used smokeless tobacco.   reports no history of alcohol use. Social History     Substance and Sexual Activity   Drug Use Never       Family History   Problem Relation Age of Onset    Heart Disease Maternal Grandmother     Cancer Mother         lung and brain cancer    Cancer Maternal Aunt         lung and brain cancer       PHYSICAL EXAM:  I performed this physical examination. Vitals:  Patient Vitals for the past 24 hrs:   BP Temp Temp src Pulse Resp SpO2 Height Weight   08/04/20 1809 (!) 116/57 -- -- 91 20 96 % -- --   08/04/20 1726 118/63 -- -- 93 23 95 % -- --   08/04/20 1656 115/67 -- -- 92 30 94 % -- --   08/04/20 1620 119/65 98.3 °F (36.8 °C) Oral 91 18 96 % 5' 4.5\" (1.638 m) 245 lb (111.1 kg)   08/04/20 1618 -- 98.3 °F (36.8 °C) Oral 91 -- -- -- --       GEN:  Appearance:  Age appropriate female. Appears uncomfortable . Level of Consciousness:  alert . Orientation:  full    HEENT: Sclera anicteric.   no adjustment of the mA/kV was utilized to reduce the radiation dose to as low as reasonably achievable. COMPARISON: March 11, 2020 HISTORY: ORDERING SYSTEM PROVIDED HISTORY: Left flank pain, recent ureteral stent TECHNOLOGIST PROVIDED HISTORY: Reason for exam:->Left flank pain, recent ureteral stent Additional Contrast?->None Reason for Exam: Left sided flank pain. Ureteral stent placed this morning and d/c from hospital this morning. Started to have severe pain around 12pm today Acuity: Acute Type of Exam: Initial Relevant Medical/Surgical History: See epic for multiple history and surgeries FINDINGS: Lower Chest: Atelectatic changes seen within the lung bases bilaterally. A calcified granuloma is present within the left lung base. Organs: Liver is homogeneous. The gallbladder surgically absent. The pancreas, spleen, and adrenal glands are stable. Nodular thickening of adrenal glands is again noted, consistent with adenomatous changes. Right kidney is normal.  A large left perinephric hematoma is present, compressing and displacing the left kidney. Hematoma measures approximately 12 cm in AP dimension, 11.3 cm in transverse dimension, and approximately 15 cm in cephalocaudal dimension. Within the caudal portion of the hematoma, a bright blushes of contrast is present, consistent with active bleeding, likely arising from the lower pole of the kidney. This is best seen on axial images number 98 through 102. Hematoma is largely subcapsular in location. This can result in a page kidney. Hemorrhage is identified within the left perirenal space, with stranding throughout the perinephric fat. Hemorrhage extends posteriorly, into the left retroperitoneum small amount of hemorrhage is seen extending anteriorly into the anterior pararenal space. Hemorrhage extends from the perirenal space, into the left lower quadrant. Left renal vascular calcifications are again noted. GI/Bowel: Stomach is nondistended.   Small and large bowel appear normal. Mild-to-moderate amount of stool is seen within the colon. Pelvis: Urinary bladder appears normal.  Uterus and adnexal regions appear normal. Peritoneum/Retroperitoneum: Stranding is seen within the left retroperitoneum, related to the large left subcapsular/perinephric hematoma extending into the anterior and posterior pararenal spaces. Atherosclerotic changes are present within the abdominal aorta, without evidence of aneurysm formation. Bones/Soft Tissues: No acute osseous abnormality is present. 1. Large left subcapsular and perinephric hematoma, with active bleeding. Hematoma measures at least 12 cm in AP dimension, 11.3 cm in transverse dimension, and 15 cm in cephalocaudal dimension, compressing and displacing the left kidney. The hemorrhage extends both superiorly and inferiorly, within the left retroperitoneum 2. Critical results were called by Dr. Yasmine Grimes to Dr Ailyn Vizcarra on 8/4/2020 at 18:22. I directly reviewed all recent imaging studies as well as pertinent prior studies. EKG:  New and pertinent prior tracings were directly reviewed. My interpretation is as follows:  Sinus rhythm, 1st degree AV block, LAD w/ old inferior MI, and IRBBB.     Active Hospital Problems    Diagnosis Date Noted    CAD (coronary artery disease) [I25.10]      Priority: High    Perinephric hematoma [S37.019A] 08/04/2020    Aortocoronary bypass status [Z95.1] 08/04/2020    Chronic systolic heart failure ([facility] [I50.22] 02/21/2020    COPD (chronic obstructive pulmonary disease) ([facility] [J44.9] 05/17/2019    Stage 3 chronic kidney disease ([facility] [N18.3] 01/09/2019    GERD (gastroesophageal reflux disease) [K21.9]     PAD (peripheral artery disease) ([facility]) [I73.9]     Type 2 diabetes mellitus with diabetic polyneuropathy, with long-term current use of insulin ([facility] [E11.42, Z79.4] 12/10/2015    CLINT (obstructive sleep apnea) [G47.33] 12/10/2015    Tobacco abuse disorder [Z72.0] 12/10/2015    Essential hypertension [I10] 04/13/2011    Viral hepatitis C [B19.20] 03/10/2010       ASSESSMENT & PLAN  Left perinephric & retroperitoneal hematoma,  Left HEMAL s/p PTA/Stent  -  Will monitor in ICU. Hold ASA and plavix. Check H/H q6h. Transfuse for Hgb <7. Continue home midodrine. Analgesics available prn.  -  Hope for tamponade. If hemodynamic deterioration occurs and not responsive to resuscitation, repeat angiography with embolization. CKD 3, Hypokalemia, Hypomagnesemia  -  Baseline creatinine 1.2 - 1.4. Currently 1.2.  -  Hold torsemide, metolazone, spironolactone. -  Electrolyte repletion prn. Type 2 DM  -  Hold all oral antidiabetic agents. Start s.c. Insulin regimen based on ~ 80% of home regimen. Microcytic anemia  -  Chronic. Expected to worsen considering hematoma size. Check micronutrient status. GERD  -  Continue PPI    COPD  -  Continue spiriva. DVT prophylaxis: SCDs only  Code Status:  Full  Disposition:  Inpatient.     Chary Lopez MD

## 2020-08-04 NOTE — ED PROVIDER NOTES
**[facility]  [facility]  EMERGENCY DEPARTMENT ENCOUNTER      Pt Name: Debbie De Souza  PKL:[id number]  [place] 1965  Date of evaluation: 8/4/2020  Provider: TRAY Crow CNP      Chief Complaint:    Chief Complaint   Patient presents with    Abdominal Pain     Patient had kidney stent put in at [address] and was d/shira this morning. Patient developed severe abdominal pain around 1200. Patient states pain feels like pain pressure on the left side       Nursing Notes, Past Medical Hx, Past Surgical Hx, Social Hx, Allergies, and Family Hx were all reviewed and agreed with or any disagreements were addressed in the HPI.    HPI:  (Location, Duration, Timing, Severity, Quality, Assoc Sx, Context, Modifying factors)  This is a  62 y.o. female who presents emergency department with left flank pain that wraps around to the abdomen, patient states that earlier today she had a renal angiogram with stent placed by Dr. Marilee Stephenson, secondary to her left renal artery stenosis. She states that around noon she started having severe pain and now is having worsening pressure in her abdomen and pelvis, has had nausea with several episodes of vomiting. She states her pain is worse now with the stent in place. She does have a history of congestive heart failure, left renal arterial stenosis, stage III kidney disease. She states that the medicine at home is not helping. She denies any cough, congestion, fever or chills. No chest pain pleuritic chest pain but does report feeling short of breath because when she breathes it makes left flank pain worse. She rates her pain a 10 on a 10, denies any additional complaints, no additional aggravating or alleviating factors.   The patient presents awake, alert in the no acute distress but does appear very uncomfortable on exam.    PastMedical/Surgical History:      Diagnosis Date    Acute kidney injury ([facility] 12/25/2019    CardioMEMs insertion for CHF    ROTATOR CUFF REPAIR Left 04/22/2016    TONSILLECTOMY      TRANSESOPHAGEAL ECHOCARDIOGRAM  01/26/2020    TRANSLUMINAL ANGIOPLASTY Left 10/08/2019    with stenting, at [facility]    TRANSLUMINAL ANGIOPLASTY Left 04/29/2020    of the [facility] re-occlusion    TUMOR EXCISION      benign tumors X 2 chest & axilla    UPPER GASTROINTESTINAL ENDOSCOPY  4/25/2013    BX barretts, HH, gastritis    UPPER GASTROINTESTINAL ENDOSCOPY  12/10/2015    UPPER GASTROINTESTINAL ENDOSCOPY  01/06/2017    Gastritis    VASCULAR SURGERY Left 12/08/2015    upper extremity and mesenteric angiogram (diagnostic only)    VASCULAR SURGERY Bilateral 07/07/2020    diagnostic lower extremity angiogram only       Medications:  Previous Medications    ALBUTEROL SULFATE  (90 BASE) MCG/ACT INHALER    Inhale 2 puffs into the lungs every 6 hours as needed for Wheezing or Shortness of Breath    ARIPIPRAZOLE (ABILIFY) 10 MG TABLET    Take 10 mg by mouth daily     ASPIRIN EC 81 MG EC TABLET    Take 1 tablet by mouth daily    ATORVASTATIN (LIPITOR) 80 MG TABLET    Take 1 tablet by mouth nightly    BENZTROPINE (COGENTIN) 1 MG TABLET    Take 1 mg by mouth nightly     BLOOD GLUCOSE TEST STRIPS (EXACTECH TEST) STRIP    6 times daily. BUPRENORPHINE-NALOXONE (SUBOXONE) 8-2 MG FILM SL FILM    Place 1 Film under the tongue 2 times daily.     BUSPIRONE (BUSPAR) 30 MG TABLET    Take 30 mg by mouth 2 times daily     CLINDAMYCIN (CLEOCIN) 300 MG CAPSULE    Take 300 mg by mouth 3 times daily    CLOPIDOGREL (PLAVIX) 75 MG TABLET    TAKE 1 TABLET BY MOUTH EVERY DAY    DOXEPIN HCL PO    Take 1 capsule by mouth nightly as needed Patient unsure of exact dosage    INSULIN DEGLUDEC (TRESIBA FLEXTOUCH) 100 UNIT/ML SOPN    Inject 30 Units into the skin nightly    INSULIN LISPRO, 1 UNIT DIAL, 100 UNIT/ML SOPN    Inject 10 Units into the skin 3 times daily    INSULIN PEN NEEDLE (B-D ULTRAFINE III SHORT PEN) 31G X 8 MM MISC    Five shots a day Negative for weakness, numbness and headaches. Positives and Pertinent negatives as per HPI. Except as noted above in the ROS, problem specific ROS was completed and is negative. Physical Exam:  Physical Exam  Vitals signs and nursing note reviewed. Constitutional:       Appearance: She is well-developed. She is not diaphoretic. HENT:      Head: Normocephalic. Right Ear: External ear normal.      Left Ear: External ear normal.   Eyes:      General:         Right eye: No discharge. Left eye: No discharge. Neck:      Musculoskeletal: Normal range of motion and neck supple. Cardiovascular:      Rate and Rhythm: Normal rate. Pulmonary:      Effort: Pulmonary effort is normal. No respiratory distress. Breath sounds: Normal breath sounds. Abdominal:      Palpations: Abdomen is soft. Tenderness: There is left CVA tenderness. Comments: Abdomen is generally soft and protuberant. Bowel sounds are hypoactive, she does have left-sided CVA tenderness, no acute ascites or rigidity. Musculoskeletal: Normal range of motion. Skin:     General: Skin is warm. Capillary Refill: Capillary refill takes less than 2 seconds. Coloration: Skin is not pale. Neurological:      General: No focal deficit present. Mental Status: She is alert and oriented to person, place, and time. Psychiatric:         Behavior: Behavior normal.      Comments: She appears quite anxious upon ED arrival.  However she does consent for safety denying homicidal or suicidal ideations.          MEDICAL DECISION MAKING    Vitals:    Vitals:    08/04/20 1620 08/04/20 1656 08/04/20 1726 08/04/20 1809   BP: 119/65 115/67 118/63 (!) 116/57   Pulse: 91 92 93 91   Resp: 18 30 23 20   Temp: 98.3 °F (36.8 °C)      TempSrc: Oral      SpO2: 96% 94% 95% 96%   Weight: 245 lb (111.1 kg)      Height: 5' 4.5\" (1.638 m)          LABS:  Labs Reviewed   CBC WITH AUTO DIFFERENTIAL - Abnormal; Notable for the following [id number]   HEMOGLOBIN AND HEMATOCRIT, BLOOD   HEMOGLOBIN AND HEMATOCRIT, BLOOD   TYPE AND SCREEN        Remainder of labs reviewed and werenegative at this time or not returned at the time of this note. RADIOLOGY:   Non-plain film images such as CT, Ultrasound and MRI are read by the radiologist. Aletha OCAMPO, TRAY - CNP have directly visualized the radiologic plain film image(s) with the below findings:        Interpretation per the Radiologist below, if available at the time of this note:    CT ABDOMEN PELVIS W IV CONTRAST Additional Contrast? None   Final Result   1. Large left subcapsular and perinephric hematoma, with active bleeding. Hematoma measures at least 12 cm in AP dimension, 11.3 cm in transverse   dimension, and 15 cm in cephalocaudal dimension, compressing and displacing   the left kidney. The hemorrhage extends both superiorly and inferiorly,   within the left retroperitoneum   2. Critical results were called by Dr. Artemio Ruffin to Dr Erlin Cast on 8/4/2020   at 18:22. MEDICAL DECISION MAKING / ED COURSE:    Because of high probability of sudden clinical deterioration of the patient's condition and risk of further deterioration, critical care time required my full attention to the patient's condition; which included chart data review, documentation, medication ordering, reviewing the patient's old records, reevaluation patient's cardiac, pulmonary and neurological status. Reevaluation of vital signs. Consultations with ED attending and admitting physician. Ordering, interpreting reviewing diagnostic testing. Therefore a critical care time was 45 minutes of direct attention to the patient's condition did not include time spent on procedures.     PROCEDURES:   Procedures    None    Patient was given:  Medications   morphine (PF) injection 4 mg (4 mg Intravenous Given 8/4/20 1968)   ondansetron (ZOFRAN) injection 4 mg (4 mg Intravenous Given 8/4/20 1850)   0.9 % sodium chloride infusion (has no administration in time range)   0.9 % sodium chloride bolus (1,000 mLs Intravenous New Bag 8/4/20 1902)   morphine (PF) injection 4 mg (4 mg Intravenous Given 8/4/20 1729)   ondansetron (ZOFRAN) injection 4 mg (4 mg Intravenous Given 8/4/20 1729)   iopamidol (ISOVUE-370) 76 % injection 75 mL (75 mLs Intravenous Given 8/4/20 1753)   potassium chloride 10 mEq/100 mL IVPB (Peripheral Line) (10 mEq Intravenous New Bag 8/4/20 1902)   fentaNYL (SUBLIMAZE) injection 50 mcg (50 mcg Intravenous Given 8/4/20 1855)       Patient complains of left flank pain that wraps around to the abdomen, reports nausea and several episodes of vomiting, earlier this morning she had a stent placed secondary to her left renal artery stenosis. She states since then she is having worsening symptoms. After evaluation and examination the patient IV access, IV fluids, blood work, urinalysis, pain medicine, nausea medicine and a CT scan were ordered. BC shows a leukocytosis with a white count of 15,600, H&H of 9.5 and 30. Metabolic panel shows hyponatremia with a sodium of 122, hypokalemia with a potassium of 2.4, patient was already ordered fluids and IV potassium. Patient's BUN is 50, creatinine 1.2 and GFR 46 this appears to be chronic secondary to the patient's history of kidney disease. I also ordered an add-on magnesium level, this level is 1.7. Patient was ordered replacement mag. Liver functions and lipase are normal. At 1801 I spoke with Dr. Zaragoza Friend, vascular urgent on-call, we discussed the patient's case, he informs me he is on the way to see the patient, he request the patient be admitted to ICU secondary to the perinephric hematoma. At [address] Dr. Zaragoza Friend here in the ER to see patient, radiologist on call, phoned and spoke with Dr. Zaragoza Friend in regards to patients imaging. In addition to placing indwelling Lees catheter was ordered.     At 18 I spoke with Dr. Vick Woodard, hospitalist on-call, we discussed the patient's case at length and he agreed to accept the patient for admission. The patient tolerated their visit well. I evaluated the patient. The physician was available for consultation as needed. The patient and / or the family were informed of the results of any tests, a time was given to answer questions, a plan was proposed and they agreed with plan. Therefore, patient will be admitted to the hospital for further evaluation management of care. CLINICAL IMPRESSION:  1. Traumatic perinephric hematoma, left, initial encounter    2. Hypokalemia    3. Stage III chronic kidney disease ([address]    4. Left renal artery stenosis (HCC)    5.  Hyponatremia        DISPOSITION Admitted 08/04/2020 07:20:25 PM      PATIENT REFERRED TO:  Axel Fernández PA-C  [address] [place] [address]  [phone]            DISCHARGE MEDICATIONS:  New Prescriptions    No medications on file       DISCONTINUED MEDICATIONS:  Discontinued Medications    No medications on file              (Please note the MDM and HPI sections of this note were completed with a voice recognition program.  Efforts were made to edit the dictations but occasionally words are mis-transcribed.)    Electronically signed, TRAY Schaffer CNP,           TRAY Schaffer CNP  08/04/20 [address] TRAY Martinez CNP  08/04/20 1682

## 2020-08-04 NOTE — ED PROVIDER NOTES
Patient presents to the emergency department complaining of flank pain. Patient had a renal artery stent completed by Dr. Mar Bruce earlier today. She reports increasing left flank pain which she reports is 10/10. Patient received 150 mcg of fentanyl in route. Of note she has not taken her Suboxone today secondary to the procedure. Physical exam: General: No acute distress. Moderate severe discomfort. Heart: Normal S1-S2. Regular rate and rhythm. Lungs: Clear to auscultation bilaterally. Abdomen: Soft. Severe left upper quadrant tenderness to palpation. Positive guarding and rebound tenderness. EKG  The Ekg interpreted by myself  normal sinus rhythm with a rate of 92  Axis is   left  QTc is 484  Inferior/posterior Q waves. Intervals and Durations are unremarkable. Sinus bradycardia from previous EKG on 6/29/2020 has resolved. Cardiac Monitoring: No ectopy. Normal rate. RADIOLOGY  X-RAYS:  I have reviewed radiologic plain film image(s). ALL OTHER NON-PLAIN FILM IMAGES SUCH AS CT, ULTRASOUND AND MRI HAVE BEEN READ BY THE RADIOLOGIST. CT ABDOMEN PELVIS W IV CONTRAST Additional Contrast? None   Final Result   1. Large left subcapsular and perinephric hematoma, with active bleeding. Hematoma measures at least 12 cm in AP dimension, 11.3 cm in transverse   dimension, and 15 cm in cephalocaudal dimension, compressing and displacing   the left kidney. The hemorrhage extends both superiorly and inferiorly,   within the left retroperitoneum   2. Critical results were called by Dr. Lazaro Little to Dr Mar Bruce on 8/4/2020   at 18:22. Rechecks: Physical assessment performed. 1620: Pulse 91.    1726: Blood pressure 118/63.    1809: Blood pressure 116/57.         Critical Care: Critical care 40 minutes was completed on patient in addition to and excluding any procedures noted secondary to patient's intractable pain and ongoing hemorrhage from perinephric hematoma. ED COURSE/MDM  Patient seen and evaluated. Old records reviewed. Labs and imaging reviewed and results discussed with patient. Patient was given multiple doses of pain medication in the emergency department. Hemoglobin appears stable. Patient was seen and evaluated by vascular surgery at bedside during her stay. I discussed patient's symptoms and management with Dr. Jean Nix. Patient will be admitted to hospitalist for further evaluation and treatment. Plan of care discussed with patient and family. Patient and family in agreement with plan. Patient was given scripts for the following medications. I counseled patient how to take these medications. New Prescriptions    No medications on file       CLINICAL IMPRESSION  1. Traumatic perinephric hematoma, left, initial encounter    2. Hypokalemia    3. Stage III chronic kidney disease ([facility] [address]    4. Left renal artery stenosis (HCC)    5. Hyponatremia        Blood pressure 118/80, pulse 89, temperature 98.3 °F (36.8 °C), temperature source Oral, resp. rate 20, height 5' 4.5\" (1.638 m), weight 245 lb (111.1 kg), last menstrual period 10/24/2012, SpO2 97 %, not currently breastfeeding. DISPOSITION  Anthony Humphreys was admitted in guarded condition.        Maya Woodson, DO  08/04/20 Ray, DO  08/07/20 1526

## 2020-08-04 NOTE — ED NOTES
Dr Yeyo Infante at pt bedside.  Speaking with NP [facility] in ED     Renay Salazar  08/04/20 4960

## 2020-08-04 NOTE — ED NOTES
Bed: 12  Expected date: 8/4/20  Expected time: 4:15 PM  Means of arrival: [id number]  Comments:  Josefina Man 12, RN  08/04/20 1099

## 2020-08-05 ENCOUNTER — APPOINTMENT (OUTPATIENT)
Dept: CARDIAC CATH/INVASIVE PROCEDURES | Age: 55
End: 2020-08-05
Payer: COMMERCIAL

## 2020-08-05 PROBLEM — S37.092A: Status: ACTIVE | Noted: 2020-08-04

## 2020-08-05 PROBLEM — E66.01 MORBID OBESITY WITH BMI OF 40.0-44.9, ADULT (HCC): Status: ACTIVE | Noted: 2020-08-05

## 2020-08-05 PROBLEM — D62 ACUTE BLOOD LOSS ANEMIA: Status: ACTIVE | Noted: 2020-08-05

## 2020-08-05 PROBLEM — D72.829 LEUKOCYTOSIS: Status: ACTIVE | Noted: 2020-08-05

## 2020-08-05 PROBLEM — N18.9 ACUTE KIDNEY INJURY SUPERIMPOSED ON CKD (HCC): Status: ACTIVE | Noted: 2020-08-05

## 2020-08-05 PROBLEM — T14.8XXA MULTIPLE WOUNDS OF SKIN: Status: ACTIVE | Noted: 2020-08-05

## 2020-08-05 PROBLEM — N17.9 ACUTE KIDNEY INJURY SUPERIMPOSED ON CKD (HCC): Status: ACTIVE | Noted: 2020-08-05

## 2020-08-05 LAB
A/G RATIO: 1.1 (ref 1.1–2.2)
ALBUMIN SERPL-MCNC: 3.1 G/DL (ref 3.4–5)
ALP BLD-CCNC: 129 U/L (ref 40–129)
ALT SERPL-CCNC: 8 U/L (ref 10–40)
ANION GAP SERPL CALCULATED.3IONS-SCNC: 11 MMOL/L (ref 3–16)
AST SERPL-CCNC: 13 U/L (ref 15–37)
BASOPHILS ABSOLUTE: 0 K/UL (ref 0–0.2)
BASOPHILS RELATIVE PERCENT: 0.3 %
BILIRUB SERPL-MCNC: 1 MG/DL (ref 0–1)
BLOOD BANK DISPENSE STATUS: NORMAL
BLOOD BANK DISPENSE STATUS: NORMAL
BLOOD BANK PRODUCT CODE: NORMAL
BLOOD BANK PRODUCT CODE: NORMAL
BPU ID: NORMAL
BPU ID: NORMAL
BUN BLDV-MCNC: 46 MG/DL (ref 7–20)
BUN BLDV-MCNC: 46 MG/DL (ref 7–20)
BUN BLDV-MCNC: 48 MG/DL (ref 7–20)
CALCIUM SERPL-MCNC: 8.7 MG/DL (ref 8.3–10.6)
CALCIUM SERPL-MCNC: 8.8 MG/DL (ref 8.3–10.6)
CALCIUM SERPL-MCNC: 9.1 MG/DL (ref 8.3–10.6)
CHLORIDE BLD-SCNC: 82 MMOL/L (ref 99–110)
CHLORIDE BLD-SCNC: 87 MMOL/L (ref 99–110)
CHLORIDE BLD-SCNC: 88 MMOL/L (ref 99–110)
CO2: 30 MMOL/L (ref 21–32)
CO2: 31 MMOL/L (ref 21–32)
CO2: 31 MMOL/L (ref 21–32)
CREAT SERPL-MCNC: 1.2 MG/DL (ref 0.6–1.1)
CREAT SERPL-MCNC: 1.5 MG/DL (ref 0.6–1.1)
CREAT SERPL-MCNC: 1.6 MG/DL (ref 0.6–1.1)
DESCRIPTION BLOOD BANK: NORMAL
DESCRIPTION BLOOD BANK: NORMAL
EKG ATRIAL RATE: 92 BPM
EKG DIAGNOSIS: NORMAL
EKG P AXIS: 74 DEGREES
EKG P-R INTERVAL: 226 MS
EKG Q-T INTERVAL: 392 MS
EKG QRS DURATION: 110 MS
EKG QTC CALCULATION (BAZETT): 484 MS
EKG R AXIS: -78 DEGREES
EKG T AXIS: 122 DEGREES
EKG VENTRICULAR RATE: 92 BPM
EOSINOPHILS ABSOLUTE: 0 K/UL (ref 0–0.6)
EOSINOPHILS RELATIVE PERCENT: 0 %
FOLATE: 11.12 NG/ML (ref 4.78–24.2)
GFR AFRICAN AMERICAN: 40
GFR AFRICAN AMERICAN: 43
GFR AFRICAN AMERICAN: 56
GFR NON-AFRICAN AMERICAN: 33
GFR NON-AFRICAN AMERICAN: 36
GFR NON-AFRICAN AMERICAN: 46
GLOBULIN: 2.9 G/DL
GLUCOSE BLD-MCNC: 178 MG/DL (ref 70–99)
GLUCOSE BLD-MCNC: 181 MG/DL (ref 70–99)
GLUCOSE BLD-MCNC: 191 MG/DL (ref 70–99)
GLUCOSE BLD-MCNC: 217 MG/DL (ref 70–99)
GLUCOSE BLD-MCNC: 230 MG/DL (ref 70–99)
GLUCOSE BLD-MCNC: 274 MG/DL (ref 70–99)
GLUCOSE BLD-MCNC: 311 MG/DL (ref 70–99)
HCT VFR BLD CALC: 23.7 % (ref 36–48)
HCT VFR BLD CALC: 25.4 % (ref 36–48)
HCT VFR BLD CALC: 28.7 % (ref 36–48)
HCT VFR BLD CALC: 28.9 % (ref 36–48)
HCT VFR BLD CALC: 29.2 % (ref 36–48)
HEMOGLOBIN: 7.5 G/DL (ref 12–16)
HEMOGLOBIN: 8 G/DL (ref 12–16)
HEMOGLOBIN: 9.2 G/DL (ref 12–16)
HEMOGLOBIN: 9.3 G/DL (ref 12–16)
HEMOGLOBIN: 9.3 G/DL (ref 12–16)
INR BLD: 1.26 (ref 0.86–1.14)
LYMPHOCYTES ABSOLUTE: 0.5 K/UL (ref 1–5.1)
LYMPHOCYTES RELATIVE PERCENT: 2.9 %
MAGNESIUM: 2 MG/DL (ref 1.8–2.4)
MAGNESIUM: 2.2 MG/DL (ref 1.8–2.4)
MAGNESIUM: 2.2 MG/DL (ref 1.8–2.4)
MCH RBC QN AUTO: 23.4 PG (ref 26–34)
MCH RBC QN AUTO: 25 PG (ref 26–34)
MCHC RBC AUTO-ENTMCNC: 31.6 G/DL (ref 31–36)
MCHC RBC AUTO-ENTMCNC: 32.3 G/DL (ref 31–36)
MCV RBC AUTO: 74 FL (ref 80–100)
MCV RBC AUTO: 77.3 FL (ref 80–100)
MONOCYTES ABSOLUTE: 0.9 K/UL (ref 0–1.3)
MONOCYTES RELATIVE PERCENT: 4.9 %
NEUTROPHILS ABSOLUTE: 16 K/UL (ref 1.7–7.7)
NEUTROPHILS RELATIVE PERCENT: 91.9 %
PDW BLD-RTO: 19.9 % (ref 12.4–15.4)
PDW BLD-RTO: 20.5 % (ref 12.4–15.4)
PERFORMED ON: ABNORMAL
PHOSPHORUS: 3.4 MG/DL (ref 2.5–4.9)
PLATELET # BLD: 353 K/UL (ref 135–450)
PLATELET # BLD: 366 K/UL (ref 135–450)
PMV BLD AUTO: 8.9 FL (ref 5–10.5)
PMV BLD AUTO: 9 FL (ref 5–10.5)
POTASSIUM REFLEX MAGNESIUM: 3.3 MMOL/L (ref 3.5–5.1)
POTASSIUM REFLEX MAGNESIUM: 3.5 MMOL/L (ref 3.5–5.1)
POTASSIUM SERPL-SCNC: 2.4 MMOL/L (ref 3.5–5.1)
POTASSIUM SERPL-SCNC: 3.3 MMOL/L (ref 3.5–5.1)
PROTHROMBIN TIME: 14.7 SEC (ref 10–13.2)
RBC # BLD: 3.2 M/UL (ref 4–5.2)
RBC # BLD: 3.71 M/UL (ref 4–5.2)
SODIUM BLD-SCNC: 123 MMOL/L (ref 136–145)
SODIUM BLD-SCNC: 129 MMOL/L (ref 136–145)
SODIUM BLD-SCNC: 130 MMOL/L (ref 136–145)
TOTAL PROTEIN: 6 G/DL (ref 6.4–8.2)
VITAMIN B-12: 947 PG/ML (ref 211–911)
WBC # BLD: 17.4 K/UL (ref 4–11)
WBC # BLD: 17.5 K/UL (ref 4–11)

## 2020-08-05 PROCEDURE — 84100 ASSAY OF PHOSPHORUS: CPT

## 2020-08-05 PROCEDURE — 76937 US GUIDE VASCULAR ACCESS: CPT | Performed by: SURGERY

## 2020-08-05 PROCEDURE — 6360000002 HC RX W HCPCS

## 2020-08-05 PROCEDURE — 2000000000 HC ICU R&B

## 2020-08-05 PROCEDURE — C1769 GUIDE WIRE: HCPCS

## 2020-08-05 PROCEDURE — 76937 US GUIDE VASCULAR ACCESS: CPT

## 2020-08-05 PROCEDURE — 2709999900 HC NON-CHARGEABLE SUPPLY

## 2020-08-05 PROCEDURE — 6370000000 HC RX 637 (ALT 250 FOR IP): Performed by: INTERNAL MEDICINE

## 2020-08-05 PROCEDURE — 83735 ASSAY OF MAGNESIUM: CPT

## 2020-08-05 PROCEDURE — 36556 INSERT NON-TUNNEL CV CATH: CPT | Performed by: SURGERY

## 2020-08-05 PROCEDURE — 36430 TRANSFUSION BLD/BLD COMPNT: CPT

## 2020-08-05 PROCEDURE — 2580000003 HC RX 258: Performed by: INTERNAL MEDICINE

## 2020-08-05 PROCEDURE — 85610 PROTHROMBIN TIME: CPT

## 2020-08-05 PROCEDURE — 85018 HEMOGLOBIN: CPT

## 2020-08-05 PROCEDURE — P9016 RBC LEUKOCYTES REDUCED: HCPCS

## 2020-08-05 PROCEDURE — B4161ZZ FLUOROSCOPY OF RIGHT RENAL ARTERY USING LOW OSMOLAR CONTRAST: ICD-10-PCS | Performed by: SURGERY

## 2020-08-05 PROCEDURE — 2700000000 HC OXYGEN THERAPY PER DAY

## 2020-08-05 PROCEDURE — 36415 COLL VENOUS BLD VENIPUNCTURE: CPT

## 2020-08-05 PROCEDURE — 6360000002 HC RX W HCPCS: Performed by: INTERNAL MEDICINE

## 2020-08-05 PROCEDURE — 80053 COMPREHEN METABOLIC PANEL: CPT

## 2020-08-05 PROCEDURE — C1894 INTRO/SHEATH, NON-LASER: HCPCS

## 2020-08-05 PROCEDURE — C1751 CATH, INF, PER/CENT/MIDLINE: HCPCS

## 2020-08-05 PROCEDURE — 99291 CRITICAL CARE FIRST HOUR: CPT | Performed by: INTERNAL MEDICINE

## 2020-08-05 PROCEDURE — 37244 VASC EMBOLIZE/OCCLUDE BLEED: CPT

## 2020-08-05 PROCEDURE — 36253 INS CATH REN ART 2ND+ UNILAT: CPT

## 2020-08-05 PROCEDURE — 6360000002 HC RX W HCPCS: Performed by: SURGERY

## 2020-08-05 PROCEDURE — C1887 CATHETER, GUIDING: HCPCS

## 2020-08-05 PROCEDURE — 37244 VASC EMBOLIZE/OCCLUDE BLEED: CPT | Performed by: SURGERY

## 2020-08-05 PROCEDURE — 2720000010 HC SURG SUPPLY STERILE

## 2020-08-05 PROCEDURE — 02HV33Z INSERTION OF INFUSION DEVICE INTO SUPERIOR VENA CAVA, PERCUTANEOUS APPROACH: ICD-10-PCS | Performed by: SURGERY

## 2020-08-05 PROCEDURE — 85025 COMPLETE CBC W/AUTO DIFF WBC: CPT

## 2020-08-05 PROCEDURE — B41F1ZZ FLUOROSCOPY OF RIGHT LOWER EXTREMITY ARTERIES USING LOW OSMOLAR CONTRAST: ICD-10-PCS | Performed by: SURGERY

## 2020-08-05 PROCEDURE — 2580000003 HC RX 258

## 2020-08-05 PROCEDURE — 04LA3DZ OCCLUSION OF LEFT RENAL ARTERY WITH INTRALUMINAL DEVICE, PERCUTANEOUS APPROACH: ICD-10-PCS | Performed by: SURGERY

## 2020-08-05 PROCEDURE — 85027 COMPLETE CBC AUTOMATED: CPT

## 2020-08-05 PROCEDURE — 94761 N-INVAS EAR/PLS OXIMETRY MLT: CPT

## 2020-08-05 PROCEDURE — 93010 ELECTROCARDIOGRAM REPORT: CPT | Performed by: INTERNAL MEDICINE

## 2020-08-05 PROCEDURE — C1760 CLOSURE DEV, VASC: HCPCS

## 2020-08-05 PROCEDURE — 94640 AIRWAY INHALATION TREATMENT: CPT

## 2020-08-05 PROCEDURE — 2780000010 HC IMPLANT OTHER

## 2020-08-05 PROCEDURE — 99152 MOD SED SAME PHYS/QHP 5/>YRS: CPT | Performed by: SURGERY

## 2020-08-05 PROCEDURE — 6360000002 HC RX W HCPCS: Performed by: NURSE PRACTITIONER

## 2020-08-05 PROCEDURE — 85014 HEMATOCRIT: CPT

## 2020-08-05 PROCEDURE — 36556 INSERT NON-TUNNEL CV CATH: CPT

## 2020-08-05 PROCEDURE — 36253 INS CATH REN ART 2ND+ UNILAT: CPT | Performed by: SURGERY

## 2020-08-05 RX ORDER — ONDANSETRON 2 MG/ML
4 INJECTION INTRAMUSCULAR; INTRAVENOUS EVERY 4 HOURS PRN
Status: DISCONTINUED | OUTPATIENT
Start: 2020-08-05 | End: 2020-08-06

## 2020-08-05 RX ORDER — MIDAZOLAM HYDROCHLORIDE 5 MG/ML
INJECTION INTRAMUSCULAR; INTRAVENOUS
Status: COMPLETED | OUTPATIENT
Start: 2020-08-05 | End: 2020-08-05

## 2020-08-05 RX ORDER — FENTANYL CITRATE 50 UG/ML
INJECTION, SOLUTION INTRAMUSCULAR; INTRAVENOUS
Status: COMPLETED | OUTPATIENT
Start: 2020-08-05 | End: 2020-08-05

## 2020-08-05 RX ORDER — PROMETHAZINE HYDROCHLORIDE 25 MG/ML
12.5 INJECTION, SOLUTION INTRAMUSCULAR; INTRAVENOUS EVERY 4 HOURS PRN
Status: DISCONTINUED | OUTPATIENT
Start: 2020-08-05 | End: 2020-08-06

## 2020-08-05 RX ORDER — PROMETHAZINE HYDROCHLORIDE 6.25 MG/5ML
12.5 SYRUP ORAL EVERY 4 HOURS PRN
Status: DISCONTINUED | OUTPATIENT
Start: 2020-08-05 | End: 2020-08-06

## 2020-08-05 RX ORDER — POTASSIUM CHLORIDE 29.8 MG/ML
20 INJECTION INTRAVENOUS ONCE
Status: COMPLETED | OUTPATIENT
Start: 2020-08-05 | End: 2020-08-05

## 2020-08-05 RX ORDER — HYDROMORPHONE HCL 110MG/55ML
0.5 PATIENT CONTROLLED ANALGESIA SYRINGE INTRAVENOUS
Status: DISCONTINUED | OUTPATIENT
Start: 2020-08-05 | End: 2020-08-05

## 2020-08-05 RX ORDER — 0.9 % SODIUM CHLORIDE 0.9 %
250 INTRAVENOUS SOLUTION INTRAVENOUS ONCE
Status: COMPLETED | OUTPATIENT
Start: 2020-08-05 | End: 2020-08-05

## 2020-08-05 RX ORDER — SODIUM CHLORIDE 9 MG/ML
INJECTION, SOLUTION INTRAVENOUS
Status: COMPLETED
Start: 2020-08-05 | End: 2020-08-05

## 2020-08-05 RX ORDER — HYDROMORPHONE HCL 110MG/55ML
PATIENT CONTROLLED ANALGESIA SYRINGE INTRAVENOUS
Status: DISPENSED
Start: 2020-08-05 | End: 2020-08-05

## 2020-08-05 RX ORDER — 0.9 % SODIUM CHLORIDE 0.9 %
1000 INTRAVENOUS SOLUTION INTRAVENOUS ONCE
Status: COMPLETED | OUTPATIENT
Start: 2020-08-05 | End: 2020-08-05

## 2020-08-05 RX ORDER — DIPHENHYDRAMINE HCL 25 MG
25 TABLET ORAL PRN
Status: DISCONTINUED | OUTPATIENT
Start: 2020-08-05 | End: 2020-08-12 | Stop reason: HOSPADM

## 2020-08-05 RX ORDER — PROMETHAZINE HYDROCHLORIDE 25 MG/1
12.5 TABLET ORAL EVERY 4 HOURS PRN
Status: DISCONTINUED | OUTPATIENT
Start: 2020-08-05 | End: 2020-08-06

## 2020-08-05 RX ORDER — ACETAMINOPHEN 325 MG/1
650 TABLET ORAL PRN
Status: DISCONTINUED | OUTPATIENT
Start: 2020-08-05 | End: 2020-08-12 | Stop reason: HOSPADM

## 2020-08-05 RX ORDER — DIPHENHYDRAMINE HYDROCHLORIDE 50 MG/ML
25 INJECTION INTRAMUSCULAR; INTRAVENOUS PRN
Status: DISCONTINUED | OUTPATIENT
Start: 2020-08-05 | End: 2020-08-12 | Stop reason: HOSPADM

## 2020-08-05 RX ORDER — HYDROMORPHONE HCL 110MG/55ML
1 PATIENT CONTROLLED ANALGESIA SYRINGE INTRAVENOUS
Status: DISCONTINUED | OUTPATIENT
Start: 2020-08-05 | End: 2020-08-05

## 2020-08-05 RX ORDER — HYDROMORPHONE HCL 110MG/55ML
1 PATIENT CONTROLLED ANALGESIA SYRINGE INTRAVENOUS
Status: DISCONTINUED | OUTPATIENT
Start: 2020-08-05 | End: 2020-08-06

## 2020-08-05 RX ORDER — ONDANSETRON 4 MG/1
4 TABLET, ORALLY DISINTEGRATING ORAL EVERY 4 HOURS PRN
Status: DISCONTINUED | OUTPATIENT
Start: 2020-08-05 | End: 2020-08-06

## 2020-08-05 RX ORDER — HYDROMORPHONE HCL 110MG/55ML
0.5 PATIENT CONTROLLED ANALGESIA SYRINGE INTRAVENOUS
Status: DISCONTINUED | OUTPATIENT
Start: 2020-08-05 | End: 2020-08-06

## 2020-08-05 RX ORDER — HYDROPHILIC CREAM
PASTE (GRAM) TOPICAL DAILY
Status: DISCONTINUED | OUTPATIENT
Start: 2020-08-05 | End: 2020-08-12 | Stop reason: HOSPADM

## 2020-08-05 RX ADMIN — MIDODRINE HYDROCHLORIDE 7.5 MG: 5 TABLET ORAL at 06:39

## 2020-08-05 RX ADMIN — INSULIN LISPRO 4 UNITS: 100 INJECTION, SOLUTION INTRAVENOUS; SUBCUTANEOUS at 21:27

## 2020-08-05 RX ADMIN — INSULIN LISPRO 8 UNITS: 100 INJECTION, SOLUTION INTRAVENOUS; SUBCUTANEOUS at 02:17

## 2020-08-05 RX ADMIN — POTASSIUM CHLORIDE 10 MEQ: 7.46 INJECTION, SOLUTION INTRAVENOUS at 02:12

## 2020-08-05 RX ADMIN — MIDAZOLAM HYDROCHLORIDE 2 MG: 5 INJECTION INTRAMUSCULAR; INTRAVENOUS at 09:40

## 2020-08-05 RX ADMIN — HYDROMORPHONE HYDROCHLORIDE 0.5 MG: 2 INJECTION, SOLUTION INTRAMUSCULAR; INTRAVENOUS; SUBCUTANEOUS at 23:41

## 2020-08-05 RX ADMIN — TIOTROPIUM BROMIDE INHALATION SPRAY 2 PUFF: 3.12 SPRAY, METERED RESPIRATORY (INHALATION) at 12:29

## 2020-08-05 RX ADMIN — INSULIN GLARGINE 24 UNITS: 100 INJECTION, SOLUTION SUBCUTANEOUS at 20:03

## 2020-08-05 RX ADMIN — ATORVASTATIN CALCIUM 80 MG: 80 TABLET, FILM COATED ORAL at 20:02

## 2020-08-05 RX ADMIN — MUPIROCIN: 20 OINTMENT TOPICAL at 20:02

## 2020-08-05 RX ADMIN — HYDROMORPHONE HYDROCHLORIDE 1 MG: 2 INJECTION, SOLUTION INTRAMUSCULAR; INTRAVENOUS; SUBCUTANEOUS at 16:12

## 2020-08-05 RX ADMIN — SODIUM CHLORIDE 250 ML: 9 INJECTION, SOLUTION INTRAVENOUS at 21:24

## 2020-08-05 RX ADMIN — CEFTRIAXONE SODIUM 1 G: 1 INJECTION, POWDER, FOR SOLUTION INTRAMUSCULAR; INTRAVENOUS at 21:27

## 2020-08-05 RX ADMIN — INSULIN LISPRO 6 UNITS: 100 INJECTION, SOLUTION INTRAVENOUS; SUBCUTANEOUS at 05:18

## 2020-08-05 RX ADMIN — POTASSIUM CHLORIDE 10 MEQ: 7.46 INJECTION, SOLUTION INTRAVENOUS at 00:39

## 2020-08-05 RX ADMIN — HYDROMORPHONE HYDROCHLORIDE 1 MG: 2 INJECTION, SOLUTION INTRAMUSCULAR; INTRAVENOUS; SUBCUTANEOUS at 08:11

## 2020-08-05 RX ADMIN — POTASSIUM CHLORIDE 10 MEQ: 7.46 INJECTION, SOLUTION INTRAVENOUS at 03:19

## 2020-08-05 RX ADMIN — Medication 10 ML: at 20:01

## 2020-08-05 RX ADMIN — POTASSIUM CHLORIDE 20 MEQ: 400 INJECTION, SOLUTION INTRAVENOUS at 12:42

## 2020-08-05 RX ADMIN — BUSPIRONE HYDROCHLORIDE 30 MG: 5 TABLET ORAL at 20:02

## 2020-08-05 RX ADMIN — POTASSIUM CHLORIDE 10 MEQ: 7.46 INJECTION, SOLUTION INTRAVENOUS at 07:31

## 2020-08-05 RX ADMIN — FENTANYL CITRATE 25 MCG: 50 INJECTION, SOLUTION INTRAMUSCULAR; INTRAVENOUS at 09:40

## 2020-08-05 RX ADMIN — ONDANSETRON 4 MG: 2 INJECTION INTRAMUSCULAR; INTRAVENOUS at 18:20

## 2020-08-05 RX ADMIN — SODIUM CHLORIDE 1000 ML: 9 INJECTION, SOLUTION INTRAVENOUS at 05:47

## 2020-08-05 RX ADMIN — POTASSIUM CHLORIDE 10 MEQ: 7.46 INJECTION, SOLUTION INTRAVENOUS at 06:22

## 2020-08-05 RX ADMIN — HYDROMORPHONE HYDROCHLORIDE 0.5 MG: 2 INJECTION, SOLUTION INTRAMUSCULAR; INTRAVENOUS; SUBCUTANEOUS at 20:13

## 2020-08-05 RX ADMIN — MIDAZOLAM HYDROCHLORIDE 2 MG: 5 INJECTION INTRAMUSCULAR; INTRAVENOUS at 09:57

## 2020-08-05 RX ADMIN — INSULIN LISPRO 2 UNITS: 100 INJECTION, SOLUTION INTRAVENOUS; SUBCUTANEOUS at 18:16

## 2020-08-05 RX ADMIN — ONDANSETRON 4 MG: 2 INJECTION INTRAMUSCULAR; INTRAVENOUS at 04:00

## 2020-08-05 RX ADMIN — INSULIN LISPRO 4 UNITS: 100 INJECTION, SOLUTION INTRAVENOUS; SUBCUTANEOUS at 08:37

## 2020-08-05 RX ADMIN — PANTOPRAZOLE SODIUM 40 MG: 40 TABLET, DELAYED RELEASE ORAL at 16:11

## 2020-08-05 RX ADMIN — BENZTROPINE MESYLATE 1 MG: 1 TABLET ORAL at 20:01

## 2020-08-05 RX ADMIN — FENTANYL CITRATE 50 MCG: 50 INJECTION, SOLUTION INTRAMUSCULAR; INTRAVENOUS at 10:10

## 2020-08-05 RX ADMIN — SODIUM CHLORIDE 250 ML: 9 INJECTION, SOLUTION INTRAVENOUS at 04:31

## 2020-08-05 RX ADMIN — LAMOTRIGINE 200 MG: 100 TABLET ORAL at 20:01

## 2020-08-05 RX ADMIN — POTASSIUM CHLORIDE 40 MEQ: 20 TABLET, EXTENDED RELEASE ORAL at 20:02

## 2020-08-05 RX ADMIN — POTASSIUM CHLORIDE 10 MEQ: 7.46 INJECTION, SOLUTION INTRAVENOUS at 04:42

## 2020-08-05 RX ADMIN — FENTANYL CITRATE 25 MCG: 50 INJECTION, SOLUTION INTRAMUSCULAR; INTRAVENOUS at 09:57

## 2020-08-05 RX ADMIN — HYDROMORPHONE HYDROCHLORIDE 0.5 MG: 2 INJECTION, SOLUTION INTRAMUSCULAR; INTRAVENOUS; SUBCUTANEOUS at 13:41

## 2020-08-05 RX ADMIN — MIDAZOLAM HYDROCHLORIDE 1 MG: 5 INJECTION INTRAMUSCULAR; INTRAVENOUS at 10:20

## 2020-08-05 RX ADMIN — MIDAZOLAM HYDROCHLORIDE 1 MG: 5 INJECTION INTRAMUSCULAR; INTRAVENOUS at 10:10

## 2020-08-05 RX ADMIN — INSULIN LISPRO 2 UNITS: 100 INJECTION, SOLUTION INTRAVENOUS; SUBCUTANEOUS at 12:35

## 2020-08-05 ASSESSMENT — PAIN DESCRIPTION - FREQUENCY: FREQUENCY: CONTINUOUS

## 2020-08-05 ASSESSMENT — PAIN SCALES - GENERAL
PAINLEVEL_OUTOF10: 10
PAINLEVEL_OUTOF10: 10
PAINLEVEL_OUTOF10: 4
PAINLEVEL_OUTOF10: 0
PAINLEVEL_OUTOF10: 10
PAINLEVEL_OUTOF10: 10
PAINLEVEL_OUTOF10: 8

## 2020-08-05 ASSESSMENT — PAIN - FUNCTIONAL ASSESSMENT: PAIN_FUNCTIONAL_ASSESSMENT: PREVENTS OR INTERFERES SOME ACTIVE ACTIVITIES AND ADLS

## 2020-08-05 ASSESSMENT — PAIN DESCRIPTION - PROGRESSION: CLINICAL_PROGRESSION: GRADUALLY IMPROVING

## 2020-08-05 ASSESSMENT — PAIN DESCRIPTION - PAIN TYPE: TYPE: ACUTE PAIN;CHRONIC PAIN

## 2020-08-05 ASSESSMENT — PAIN DESCRIPTION - LOCATION: LOCATION: ABDOMEN;BACK

## 2020-08-05 ASSESSMENT — PAIN DESCRIPTION - ONSET: ONSET: ON-GOING

## 2020-08-05 NOTE — PROGRESS NOTES
Vascular    Coil embolization Left subsegmental renal artery  Placement Central Venous Cath. Note dictated.

## 2020-08-05 NOTE — OP NOTE
[address] [address]                                OPERATIVE REPORT    PATIENT NAME: Nicky BADILLO                    :        1965  MED REC NO:   [id number]                          ROOM:  ACCOUNT NO:   [de-identified]                           ADMIT DATE: 2020  PROVIDER:     Patrice Kenney MD    DATE OF PROCEDURE:  2020    ANGIOGRAM PROCEDURE REPORT    PREOPERATIVE DIAGNOSIS:  Renal artery stenosis. POSTOPERATIVE DIAGNOSIS:  Renal artery stenosis. PROCEDURES PERFORMED:  1. Ultrasound-guided right femoral artery access. 2.  Flush CO2 aortogram.  3.  Bilateral selective renal angiograms. 4.  Angioplasty and stenting of left renal artery stenosis. ANESTHESIA:  Local with moderate monitored sedation. INDICATIONS:  The patient is a 12-year-old female with peripheral  vascular disease, hypertension, coronary artery disease. She has been  troubled by persistent lower extremity edema, which is very difficult to  control as well as pulmonary edema. The patient had previously  undergone lower extremity angiograms and at that time a significant left  renal artery stenosis was noted. After discussions with nephrology, it  was felt that stenting the left renal artery was worthwhile in hopes of  achieving better control of blood pressure and her issues with edema. PROCEDURE:  The patient was brought to the angio suite, placed in supine  position. Under my direct supervision, Versed and fentanyl were  administered for moderate sedation. The patient was monitored by an  independent trained nurse observer using continuous blood pressure, EKG  and pulse oximetry. The right femoral region was then prepped and  draped in sterile fashion. Ultrasound was used to identify a bypass  graft in the right femoral region, which was then tied into the common  femoral artery.   These vessels were patent and pulsatile. I accessed  the common femoral artery under direct visualization just above the  proximal anastomosis of the bypass graft. A 6-Turkish introducer sheath  was then placed. Digital copy of the ultrasound image was saved and  placed in the patient's record. Bentson wire was advanced into the  abdominal aorta and a pigtail catheter was advanced over the wire. CO2  aortogram was then obtained showing the origins of the renal arteries,  and did appear again to be a significant left renal artery stenosis and  perhaps some mild stenosis of the right renal artery origin. The  pigtail catheter was removed over a Bentson wire and replaced with a  JR-4 6-Turkish guide catheter. I then used a V-14 control wire to access  the left renal artery. Selective left renal artery angiogram was  performed using dilute contrast.  This did confirm the area of  high-grade stenosis. Angioplasty and stenting was then performed using  a 5 mm x 15 mm balloon expandable stent. Postdeployment images obtained  showed excellent flow with resolution of the stenosis. The wire was  removed from the left renal artery. We began using the JR-4 guide  catheter and the V-14 control wire was able to access the right renal  artery and selective angiogram was performed here as well. This did  show less than 50% stenosis. I did not feel this was worth of stent  deployment for this degree of stenosis. The catheters and wires were  then removed. Retrograde CO2 femoral angiogram was performed and a  6-Turkish Mynx closure device was deployed in standard fashion achieving  excellent hemostasis. The patient was transferred to the recovery area  at the conclusion of procedure. Estimated blood loss was minimal.   Total contrast used was 15 mL. Angiographic findings were as noted  throughout the body of this report.         Jose Luis Ureña MD    D: 08/04/2020 14:00:04       T: 08/04/2020 14:05:24     [email or web address]  Job#: [id number]     Doc#: [id number]    CC:

## 2020-08-05 NOTE — CARE COORDINATION
CASE MANAGEMENT INITIAL ASSESSMENT      Reviewed chart and completed assessment with: patient  Explained Case Management role/services. Primary contact information: Lilly Chavira [phone]    Admit date/status: 8/4/20  Diagnosis: perinephric hematoma   Is this a Readmission?:  If so, please indicate possible factors that led to readmission. no    Insurance: caresource   Precert required for SNF - yes    3 night stay required - no    Living arrangements, Adls, care needs, prior to admission: pt lives in a mobile home with 5 steps to enter. Lives with daughter and Earlie Love. Normally independent in ADL's     Transportation: private    [facility] Repair Report at home: Walker__Cane__RTS__ BSC__Shower Chair_X_  02__ HHN__ CPAP__  BiPap__  Hospital Bed__ W/C___ Other__________    Services in the home and/or outpatient, prior to admission: none    PT/OT recs: none    Hospital Exemption Notification (HEN): not initiated    Barriers to discharge: none    Plan/comments:   Spoke to patient at bedside. Pt states she is normally independent prior to admission and drives. Denies any current needs. CM will continue to follow.  Lolis Davis RN     ECOC on chart for MD signature

## 2020-08-05 NOTE — PROGRESS NOTES
Hospitalist Progress Note      PCP: Richard Petty PA-C    Date of Admission: 8/4/2020    Chief Complaint: left flank pain     Hospital Course:    62 y.o. female. She underwent bilateral renal angiography on 8/4/2020 by Dr. Félix Gimenez. The left renal artery was stenotic. Angioplasty and stenting was performed. Patient was observed for ~3.5hrs post procedure and did well. She went home and developed severe left flank pain and so returns to the ER. Imaging showed left perinephric and retroperitoneal hematoma. Vascular surgery was consulted and admitted for close monitoring in ICU         Subjective:     S/p surgery today for evacuation of hematoma and embolization of renal artery. Pt somnolent but easily arousable.  Reports left flank pain       Medications:  Reviewed    Infusion Medications    dextrose       Scheduled Medications    mupirocin   Nasal BID    HYDROmorphone        [START ON 8/6/2020] aspirin EC  81 mg Oral Daily    atorvastatin  80 mg Oral Nightly    ARIPiprazole  10 mg Oral Daily    benztropine  1 mg Oral Nightly    busPIRone  30 mg Oral BID    [START ON 8/6/2020] clopidogrel  75 mg Oral Daily    lamoTRIgine  200 mg Oral BID    midodrine  7.5 mg Oral TID    pantoprazole  40 mg Oral BID    potassium chloride  40 mEq Oral BID    tiotropium  2 puff Inhalation Daily    insulin glargine  24 Units Subcutaneous Nightly    insulin lispro  6 Units Subcutaneous TID WC    insulin lispro  0-12 Units Subcutaneous Q4H     PRN Meds: ondansetron, ondansetron, promethazine, promethazine, promethazine, acetaminophen, diphenhydrAMINE, diphenhydrAMINE, HYDROmorphone **OR** HYDROmorphone, traZODone, sodium chloride flush, magnesium sulfate, glucose, dextrose, glucagon (rDNA), dextrose, potassium chloride **OR** potassium alternative oral replacement **OR** potassium chloride      Intake/Output Summary (Last 24 hours) at 8/5/2020 1352  Last data filed at 8/5/2020 0744  Gross per 24 hour   Intake 7689 ml   Output 1100 ml   Net 2222 ml       Physical Exam Performed:    BP (!) 122/58   Pulse 97   Temp 98 °F (36.7 °C) (Oral)   Resp 19   Ht 5' 4.5\" (1.638 m)   Wt 245 lb (111.1 kg)   LMP 10/24/2012   SpO2 98%   BMI 41.40 kg/m²     General appearance: No apparent distress, appears stated age and cooperative. HEENT: Pupils equal, round,  Conjunctivae/corneas clear. Neck: Supple, with full range of motion. No jugular venous distention. Trachea midline. Respiratory:  Normal respiratory effort. Clear to auscultation, bilaterally without Rales/Wheezes/Rhonchi  Cardiovascular: Regular rate and rhythm with normal S1/S2 without murmurs, rubs or gallops. Abdomen: Soft, non-tender, non-distended with normal bowel sounds. Musculoskeletal: No clubbing, cyanosis or edema bilaterally. Skin: warm and dry   Neurologic:  Limited due to somnolence but arousable , following commands. Psychiatric: sleepy         Labs:   Recent Labs     08/04/20 1616 08/05/20  0421 08/05/20  1045 08/05/20  1229   WBC 15.6*  --  17.4* 17.5*  --    HGB 9.5*   < > 7.5* 9.3* 9.2*   HCT 30.0*   < > 23.7* 28.7* 28.9*     --  366 353  --     < > = values in this interval not displayed. Recent Labs     08/04/20 1616 08/04/20  2346 08/05/20  0421 08/05/20  1045   *  --  123* 129*   K 2.4* 2.4* 3.3* 3.3*   CL 76*  --  82* 87*   CO2 31  --  30 31   BUN 50*  --  46* 46*   CREATININE 1.2*  --  1.2* 1.5*   CALCIUM 8.9  --  8.7 8.8   PHOS  --   --  3.4  --      Recent Labs     08/04/20  1616 08/05/20  0421   AST 13* 13*   ALT 10 8*   BILITOT 1.4* 1.0   ALKPHOS 146* 129     Recent Labs     08/04/20  0700 08/05/20  0421   INR 1.21* 1.26*     No results for input(s): Gladys Austin in the last 72 hours.     Urinalysis:      Lab Results   Component Value Date    NITRU Negative 08/04/2020    WBCUA None seen 08/04/2020    BACTERIA Rare 09/28/2019    RBCUA 3-4 08/04/2020    BLOODU TRACE-INTACT 08/04/2020    SPECGRAV 1.010 08/04/2020 GLUCOSEU Negative 08/04/2020       Radiology:  CT ABDOMEN PELVIS W IV CONTRAST Additional Contrast? None   Final Result   1. Large left subcapsular and perinephric hematoma, with active bleeding. Hematoma measures at least 12 cm in AP dimension, 11.3 cm in transverse   dimension, and 15 cm in cephalocaudal dimension, compressing and displacing   the left kidney. The hemorrhage extends both superiorly and inferiorly,   within the left retroperitoneum   2. Critical results were called by Dr. Codi Juarez to Dr Jatinder Carcamo on 8/4/2020   at 18:22. Assessment/Plan:    Active Hospital Problems    Diagnosis    CAD (coronary artery disease) [I25.10]     Priority: High    Traumatic perinephric hematoma, left, initial encounter [S37.092A]    Aortocoronary bypass status [Z95.1]    Chronic systolic heart failure (HCC) [I50.22]    COPD (chronic obstructive pulmonary disease) ([facility]) [J44.9]    Stage 3 chronic kidney disease ([facility]) [N18.3]    GERD (gastroesophageal reflux disease) [K21.9]    PAD (peripheral artery disease) ([facility]) [I73.9]    Type 2 diabetes mellitus with diabetic polyneuropathy, with long-term current use of insulin (HCC) [E11.42, Z79.4]    CLINT (obstructive sleep apnea) [G47.33]    Tobacco abuse disorder [Z72.0]    Essential hypertension [I10]    Viral hepatitis C [B19.20]     Perinephric hematoma : noted after renal angio on 8/4/2020. Vascular assisting. Now s/p  embolization of left subsegmental renal artery and CVC placement. Held aspirin and plavix. Continue close monitoring in ICU. Monitor hgb and transfuse as needed. SHAUNNA on CKD stage III: likely due to perinephric hematoma, ? Contrast induced nephropathy from recent angiogram. Received IVF. Will consult nephro. Monitor and avoid nephrotoxins. Chronic systolic CHF : EF 67%. diuretics on hold due to hypotension earlier today. Close monitoring of vol status.        Hypokalemia and hypomagnesemia: monitor and replacing electrolytes as needed    Acute on chronic anemia: likely due to acute blood loss from hematoma. S/p 2 PRBCs. Monitor hgb and transfuse as needed if <7. GERD : continue PPI       COPD: stable. Continue inhalers. Leucocytosis : likely due to postsurgical stress response. Pt afebrile with no over signs of infection. Monitor       DMII : continue lantus and SSI. DVT Prophylaxis: SCDs. Diet: Diet NPO Effective Now Exceptions are: Sips with Meds, Ice Chips  Code Status: Full Code    PT/OT Eval Status: not indicated at this time.      Dispo - continue in ICU   Dejon Carmen MD

## 2020-08-05 NOTE — PROGRESS NOTES
Dr. Jean Nix at bedside to round. Plans to go back to OR this AM for repeat angiogram with embolization. Plans to place a triple lumen CVC. Will print of consents.

## 2020-08-05 NOTE — PROGRESS NOTES
Handoff report at bedside from Quirino Dove, [address] [place] [address]. Vitals stable on 1L oxygen. Patient drowsy, sleeping, wakes to speech. Repositioned, skin assessed x 2 RN's. Coccyx intact with sacral mepilex in place. Scattered diabetic ulcers noted, right femoral sheath site clean, dry, intact. Labs, orders, medications reviewed. Call light in reach, bed alarm on.

## 2020-08-05 NOTE — PROGRESS NOTES
4 Eyes Skin Assessment     The patient is being assess for   Shift Handoff    I agree that 2 RN's have performed a thorough Head to Toe Skin Assessment on the patient. ALL assessment sites listed below have been assessed. Areas assessed by both nurses:   [x]   Head, Face, and Ears   [x]   Shoulders, Back, and Chest, Abdomen  [x]   Arms, Elbows, and Hands   [x]   Coccyx, Sacrum, and Ischium  [x]   Legs, Feet, and Heels          Co-signer eSignature: Electronically signed by Yesenia Bolaños RN on 8/5/20 at 9:39 AM EDT    Does the Patient have Skin Breakdown?   Yes         Vivek Prevention initiated:  Yes   Wound Care Orders initiated:  NA      [facility] nurse consulted for Pressure Injury (Stage 3,4, Unstageable, DTI, NWPT, Complex wounds)and New or Established Ostomies:  Yes      Primary Nurse eSignature: Electronically signed by Kimber Trevino RN on 8/5/20 at 7:44 AM EDT

## 2020-08-05 NOTE — PROGRESS NOTES
Pt arrived to ICU room 252. Pt hooked up to ICU monitors and wiped with chlorhexidine wipes. A+Ox4 at this time, on room air. NSR on monitor w/ occasional PVC's. PIV x2 and roche catheter in place. Wounds noted on bilateral legs and old healing ulcers on buttocks. Orders to hold ASA and Plavix in AM on 8/5. Will continue to monitor.

## 2020-08-05 NOTE — CONSULTS
Department of Internal Medicine  Nephrology Consult Note      Reason for Consult: SHAUNNA  Requesting Physician: Dr. Vee Norwood:  L flank pain    History Obtained From:  Patient / chart. HISTORY OF PRESENT ILLNESS:  Ms. Shay Johnston is a 61 yo female with hx DM2; renovascular HTN; CAD - S/P CABG and PCI; PVD - S/P LE revascularization and CKD3. She had had elective renal arteriogram on 8/4 and was found to have significant L renal artery stenosis and she underwent successful stenting and angioplasty of the L renal artery. She was discharged home in stable condition. However, she returned several hours later due to acute onset of L flank pain. Initial BP was 119/65. Lab incl. Creat 1.2 (was 1.2 earlier in day) and hgb 9.5 (was 11.2 earlier in day). CT showed large subcapsular and perinephric hematoma. She went on to have SBP as low as 68. Hgb declined to as low as 7.5; has rec'd 2 units PRBC's with f/u hgb 9.2. She has had angiography with placement of coil in L renal artery subsegmental vessel. F/U creat 1.5 this am.    She has long-standing hx of CKD 3 with baseline creat 1.3 to 1.6. She has had hx of SHAUNNA due to cardiorenal syndrome. During hospitalization in late June / early July 2020, creat peaked at 4.1 before declining to 1.6 at D/C. UA negative protein / negative blood (6/20). Renal US (3/20) - R kidney 10.1 cm / L kidney 10.3 cm. No hydronephrosis. Hx DM2; renovasc HTN. Outpt meds included torsemide, spironolactone and (prn) metolazone.     Past Medical History:    Past Medical History:   Diagnosis Date    Acute kidney injury ([facility] [facility] 12/25/2019    Acute on chronic congestive heart failure (HCC)     Alcohol dependence ([facility] 3/10/2010    Bipolar 1 disorder ([facility] [address] [facility]     CAD (coronary artery disease)     Cardiomyopathy ([facility] [facility] 06/29/2020    EF= 25%    Cellulitis 6/3/2020    CHF (congestive heart failure) (HCC)     COPD (chronic obstructive pulmonary disease) (HCC)     Diabetic ulcer of left foot associated with type 2 diabetes mellitus ([facility] 1/18/2020    Diabetic ulcer of toe of right foot associated with type 2 diabetes mellitus ([facility] [facility] 1/18/2020    GERD (gastroesophageal reflux disease)     High cholesterol     HTN (hypertension)     Kidney disease, chronic, stage II (mild, EGFR 60+ ml/min)     MI (myocardial infarction) ([facility] [facility] 10/07/2019    NSTEMI    Positive FIT (fecal immunochemical test) 2/28/2020    Pulmonary nodule     PVD (peripheral vascular disease) ([facility])      Past Surgical History:    Past Surgical History:   Procedure Laterality Date    ARTERIAL BYPASS SURGRY Left 01/06/2016    Axillary/Subclavian to Brachial Bypass w/reversed SVG    CARDIAC CATHETERIZATION  03/27/2018    Dr. James Read - at [address] [facility]  01/20/2020    Dr. Charisma Casas      pancreatitis post surgery    CORONARY ANGIOPLASTY WITH STENT PLACEMENT  03/16/2018    MELODY- 3.5 x 38 and 3.5 x 20 to Cx    CORONARY ANGIOPLASTY WITH STENT PLACEMENT  01/03/2018    MELODY- 3.0 x 38 and 2.75 x 26 and 2.75 x 8 and 2.75 x 22 to the LAD    CORONARY ANGIOPLASTY WITH STENT PLACEMENT  10/07/2019    MELODY- 2.5 x 8 to [address] GRAFT N/A 1/27/2020    CORONARY ARTERY BYPASS GRAFTING X 1, ON PUMP BEATING HEART, INTERNAL MAMMARY ARTERY TO LEFT ANTERIOR DESCENDING ARTERY, VAS CATH INSERTION, URI, PLATELET GEL APPLICATION, 5 LEVEL BILATERAL INTERCOSTAL NERVE BLOCK, STERNAL PLATING performed by Alice Maloney MD at [address] [place] [address] Right 5/16/2019    fem-pop, performed by Donna Elkins MD at [address]  02/14/2019    CardioMEMs insertion for CHF    ROTATOR CUFF REPAIR Left 04/22/2016    TONSILLECTOMY      TRANSESOPHAGEAL ECHOCARDIOGRAM  01/26/2020    TRANSLUMINAL ANGIOPLASTY Left 10/08/2019    with stenting, at [facility]    [facility] Left 04/29/2020    of the [facility] re-occlusion    TUMOR EXCISION      benign tumors Oral Nightly Otis Morel MD        busPIRone (BUSPAR) tablet 30 mg  30 mg Oral BID Otis Morel MD   30 mg at 08/04/20 2223    [Held by provider] clopidogrel (PLAVIX) tablet 75 mg  75 mg Oral Daily Otis Morel MD        lamoTRIgine (LAMICTAL) tablet 200 mg  200 mg Oral BID Otis Morel MD   200 mg at 08/04/20 2222    midodrine (PROAMATINE) tablet 7.5 mg  7.5 mg Oral TID Otis Morel MD   Stopped at 08/05/20 1244    pantoprazole (PROTONIX) tablet 40 mg  40 mg Oral BID Otis Morel MD        potassium chloride (KLOR-CON M) extended release tablet 40 mEq  40 mEq Oral BID Otis Morel MD   40 mEq at 08/04/20 2237    tiotropium (SPIRIVA RESPIMAT) 2.5 MCG/ACT inhaler 2 puff  2 puff Inhalation Daily Otis Morel MD   2 puff at 08/05/20 1229    traZODone (DESYREL) tablet 100 mg  100 mg Oral Nightly PRN Otis Morel MD        sodium chloride flush 0.9 % injection 10 mL  10 mL Intravenous PRN Otis Morel MD        magnesium sulfate 1 g in dextrose 5% 100 mL IVPB  1 g Intravenous PRN Otis Morel MD        glucose (GLUTOSE) 40 % oral gel 15 g  15 g Oral PRN Otis Morel MD        dextrose 50 % IV solution  12.5 g Intravenous PRN Otis Morel MD        glucagon (rDNA) injection 1 mg  1 mg Intramuscular PRN Otis Morel MD        dextrose 5 % solution  100 mL/hr Intravenous PRN Otis Morel MD        insulin glargine (LANTUS) injection vial 24 Units  24 Units Subcutaneous Nightly Otis Morel MD   24 Units at 08/04/20 2214    insulin lispro (HUMALOG) injection vial 6 Units  6 Units Subcutaneous TID WC Otis Morel MD   Stopped at 08/05/20 1236    insulin lispro (HUMALOG) injection vial 0-12 Units  0-12 Units Subcutaneous Q4H Otis Morel MD   2 Units at 08/05/20 1235    potassium chloride (KLOR-CON M) extended release tablet 40 mEq  40 mEq Oral PRN Otis Morel MD        Or    potassium bicarb-citric acid (EFFER-K) effervescent tablet 40 mEq  40 mEq Oral PRN Lupe Norris MD        Or    potassium chloride 10 mEq/100 mL IVPB (Peripheral Line)  10 mEq Intravenous PRN Lupe Norris  mL/hr at 08/05/20 0731 10 mEq at 08/05/20 0731     Allergies:  Lisinopril    Social History:    . Family History:   Negative for renal disease. REVIEW OF SYSTEMS:    Sedentary. Wt stable. No fever / chills. Notes she is currently treated for sinusitis / bronchitis. Notes no chest pain, but has HIGHTOWER. Has LE edema. Notes severe L flank pain. Was nauseated earlier. No constipation / diarrhea. Notes no dysuria / hematuria; no UTI; no nephrolithiasis. Notes aches / pains. Notes recent headaches. Notes neuropathic sx. PHYSICAL EXAM:      Vitals:    Height: 5' 4.5\" (1.638 m), Weight: 245 lb (111.1 kg), BP: 130/78      Constitutional:  Obese, mid-aged female. She is mild distress due to pain. HEENT:  Eyes: Not icteric. Nose: O2 per NC. Respiratory: Clear. Cardiovascular/Edema:  Reg rhythm; trace edema  Gastrointestinal: Obese; BS present. Marked L flank tenderness. Neurologic: No gross focal neurologic findings. Skin:  Wounds LE dressed. DATA:    Lab Results   Component Value Date    CREATININE 1.5 (H) 08/05/2020    BUN 46 (H) 08/05/2020     (L) 08/05/2020    K 3.3 (L) 08/05/2020    CL 87 (L) 08/05/2020    CO2 31 08/05/2020     Lab Results   Component Value Date    WBC 17.5 (H) 08/05/2020    HGB 9.2 (L) 08/05/2020    HCT 28.9 (L) 08/05/2020    MCV 77.3 (L) 08/05/2020     08/05/2020       IMPRESSION/RECOMMENDATIONS:      1. SHAUNNA. Not oliguric.  - Etiology: Pre-renal vs ATN (hypotension; contrast dye)   - Evaluate - Serial serum creat values; monitor UOP  - Plan: Maintain SBP; avoid nephrotoxic meds. 2. Perinephric hematoma. - Complication of L renal artery angioplasty / stent placement  - S/P coil embolization of subsegmental L renal artery    3.  Renovasc HTN.  - S/P L renal artery angioplasty / stent placement  - Follow for late recurrence of HTN (post-perinephric bleed)    4. Anemia - blood loss. - Hgb 9.2   - S/P transfusion 2 units PRBC's    5. Hyponatremia  - Na 129 (was 124 prior to procedure)  - Will need further assessment when status stabilizes    6. Hypokalemia. - Likely due to diuretic effect and, on presentation to ER, catecholamine effect  - Receiving KCl supplement  - Check BMP before giving additional KCl    7. DM2.  - On Lantus and sliding scale insulin    Thank you. Will follow.

## 2020-08-05 NOTE — PLAN OF CARE
Problem: Falls - Risk of:  Goal: Will remain free from falls  Description: Will remain free from falls  Outcome: Ongoing     Problem: Falls - Risk of:  Goal: Absence of physical injury  Description: Absence of physical injury  Outcome: Ongoing     Problem: Pain:  Goal: Pain level will decrease  Description: Pain level will decrease  Outcome: Ongoing     Problem: Pain:  Goal: Control of acute pain  Description: Control of acute pain  Outcome: Ongoing     Problem: Bleeding:  Goal: Will show no signs and symptoms of excessive bleeding  Description: Will show no signs and symptoms of excessive bleeding  Outcome: Ongoing     Problem: Skin Integrity:  Goal: Will show no infection signs and symptoms  Description: Will show no infection signs and symptoms  Outcome: Ongoing     Problem: Skin Integrity:  Goal: Absence of new skin breakdown  Description: Absence of new skin breakdown  Outcome: Ongoing

## 2020-08-05 NOTE — PLAN OF CARE
Problem: Falls - Risk of:  Goal: Will remain free from falls  Description: Will remain free from falls  8/5/2020 0952 by Margaret Barahona RN  Outcome: Ongoing  8/5/2020 0307 by Alexia Kapadia RN  Outcome: Ongoing  Goal: Absence of physical injury  Description: Absence of physical injury  8/5/2020 0952 by Margaret Barahona RN  Outcome: Ongoing  8/5/2020 0307 by Alexia Kapadia RN  Outcome: Ongoing     Problem: Pain:  Description: Pain management should include both nonpharmacologic and pharmacologic interventions.   Goal: Pain level will decrease  Description: Pain level will decrease  8/5/2020 0952 by Margaret Barahona RN  Outcome: Ongoing  8/5/2020 0307 by Alexia Kapadia RN  Outcome: Ongoing  Goal: Control of acute pain  Description: Control of acute pain  8/5/2020 0952 by Margaret Barahona RN  Outcome: Ongoing  8/5/2020 0307 by Alexia Kapadia RN  Outcome: Ongoing  Goal: Control of chronic pain  Description: Control of chronic pain  Outcome: Ongoing  Prn dilaudid    Problem: Bleeding:  Goal: Will show no signs and symptoms of excessive bleeding  Description: Will show no signs and symptoms of excessive bleeding  8/5/2020 0952 by Margaret Barahona RN  Outcome: Ongoing  8/5/2020 0307 by Alexia Kapadia RN  Outcome: Ongoing   OR with Dr. Carlos A Cifuentes this     Problem: Skin Integrity:  Goal: Will show no infection signs and symptoms  Description: Will show no infection signs and symptoms  8/5/2020 0952 by Margaret Barahona RN  Outcome: Ongoing  8/5/2020 0307 by Alexia Kapadia RN  Outcome: Ongoing  Goal: Absence of new skin breakdown  Description: Absence of new skin breakdown  8/5/2020 0952 by Margaret Barahona RN  Outcome: Ongoing  8/5/2020 0307 by Alexia Kapadia RN  Outcome: Ongoing   Wound orders

## 2020-08-05 NOTE — CONSULTS
FIT (fecal immunochemical test) 2/28/2020    Pulmonary nodule     PVD (peripheral vascular disease) ([facility] [address]        PAST SURGICAL HISTORY    Past Surgical History:   Procedure Laterality Date    ARTERIAL BYPASS SURGRY Left 01/06/2016    Axillary/Subclavian to Brachial Bypass w/reversed SVG    CARDIAC CATHETERIZATION  03/27/2018    Dr. Edda William - at [address] [place]  01/20/2020    Dr. Lisa Villanueva      pancreatitis post surgery    CORONARY ANGIOPLASTY WITH STENT PLACEMENT  03/16/2018    MELODY- 3.5 x 38 and 3.5 x 20 to Cx    CORONARY ANGIOPLASTY WITH STENT PLACEMENT  01/03/2018    MELODY- 3.0 x 38 and 2.75 x 26 and 2.75 x 8 and 2.75 x 22 to the LAD    CORONARY ANGIOPLASTY WITH STENT PLACEMENT  10/07/2019    MELODY- 2.5 x 8 to [address] GRAFT N/A 1/27/2020    CORONARY ARTERY BYPASS GRAFTING X 1, ON PUMP BEATING HEART, INTERNAL MAMMARY ARTERY TO LEFT ANTERIOR DESCENDING ARTERY, VAS CATH INSERTION, URI, PLATELET GEL APPLICATION, 5 LEVEL BILATERAL INTERCOSTAL NERVE BLOCK, STERNAL PLATING performed by Edward Cid MD at [address] Right 5/16/2019    fem-pop, performed by Jeremi Hughes MD at [address]  02/14/2019    CardioMEMs insertion for CHF    ROTATOR CUFF REPAIR Left 04/22/2016    TONSILLECTOMY      TRANSESOPHAGEAL ECHOCARDIOGRAM  01/26/2020    TRANSLUMINAL ANGIOPLASTY Left 10/08/2019    with stenting, at [facility]    [facility] [address] Left 04/29/2020    of the [facility] re-occlusion    TUMOR EXCISION      benign tumors X 2 chest & axilla    UPPER GASTROINTESTINAL ENDOSCOPY  4/25/2013    BX barretts, HH, gastritis    UPPER GASTROINTESTINAL ENDOSCOPY  12/10/2015    UPPER GASTROINTESTINAL ENDOSCOPY  01/06/2017    Gastritis    VASCULAR SURGERY Left 12/08/2015    upper extremity and mesenteric angiogram (diagnostic only)    VASCULAR SURGERY Bilateral 07/07/2020    diagnostic lower extremity angiogram only       FAMILY HISTORY    Family History   Problem Relation Age of Onset    Heart Disease Maternal Grandmother     Cancer Mother         lung and brain cancer    Cancer Maternal Aunt         lung and brain cancer       SOCIAL HISTORY    Social History     Tobacco Use    Smoking status: Current Every Day Smoker     Packs/day: 1.00     Years: 30.00     Pack years: 30.00     Types: Cigarettes     Last attempt to quit: 2019     Years since quittin.0    Smokeless tobacco: Never Used    Tobacco comment: trying to quit, one cigarette daily   Substance Use Topics    Alcohol use: No     Comment: quit      Drug use: Never       ALLERGIES    Allergies   Allergen Reactions    Lisinopril Other (See Comments)     Severe hypotension       MEDICATIONS    No current facility-administered medications on file prior to encounter. Current Outpatient Medications on File Prior to Encounter   Medication Sig Dispense Refill    clindamycin (CLEOCIN) 300 MG capsule Take 300 mg by mouth 3 times daily      traZODone (DESYREL) 100 MG tablet Take 100 mg by mouth nightly as needed for Sleep Can take 1-2 tabs nightly      DOXEPIN HCL PO Take 1 capsule by mouth nightly as needed Patient unsure of exact dosage      metOLazone (ZAROXOLYN) 2.5 MG tablet TAKE 1 TABLET BY MOUTH AS NEEDED (AS NEEDED FOR SWELLING, WEIGHT GAIN) 30 tablet 3    albuterol sulfate  (90 Base) MCG/ACT inhaler Inhale 2 puffs into the lungs every 6 hours as needed for Wheezing or Shortness of Breath 1 Inhaler 3    midodrine (PROAMATINE) 5 MG tablet Take 1.5 tablets by mouth 3 times daily 90 tablet 3    torsemide (DEMADEX) 100 MG tablet Take 1 tablet by mouth daily 100 mg daily except 50 mg twice weekly (Monday and Thursday).       Insulin Degludec (TRESIBA FLEXTOUCH) 100 UNIT/ML SOPN Inject 30 Units into the skin nightly 10 pen 5    insulin lispro, 1 Unit Dial, 100 UNIT/ML SOPN Inject 10 Units into the skin 3 times daily 3 pen 2    potassium chloride (KLOR-CON M) 20 MEQ extended release tablet Take 2 tablets by mouth 4 times daily (Patient taking differently: Take 40 mEq by mouth 2 times daily ) 240 tablet 3    spironolactone (ALDACTONE) 25 MG tablet Take 1 tablet by mouth daily 30 tablet 3    atorvastatin (LIPITOR) 80 MG tablet Take 1 tablet by mouth nightly 90 tablet 3    tiotropium (SPIRIVA RESPIMAT) 2.5 MCG/ACT AERS inhaler INHALE 2 PUFFS INTO THE LUNGS DAILY 1 Inhaler 6    buprenorphine-naloxone (SUBOXONE) 8-2 MG FILM SL film Place 1 Film under the tongue 2 times daily.  benztropine (COGENTIN) 1 MG tablet Take 1 mg by mouth nightly       blood glucose test strips (EXACTECH TEST) strip 6 times daily.  200 strip 6    Insulin Pen Needle (B-D ULTRAFINE III SHORT PEN) 31G X 8 MM MISC Five shots a day 200 each 2    INSULIN SYRINGE .5CC/29G 29G X 1/2\" 0.5 ML MISC 1 each by Does not apply route daily 100 each 3    Liraglutide (VICTOZA) 18 MG/3ML SOPN SC injection Inject 1.2 mg into the skin daily 2 pen 3    pantoprazole (PROTONIX) 40 MG tablet Take 1 tablet by mouth 2 times daily 60 tablet 0    clopidogrel (PLAVIX) 75 MG tablet TAKE 1 TABLET BY MOUTH EVERY DAY 30 tablet 5    magnesium oxide (MAG-OX) 400 (240 Mg) MG tablet TAKE 1 TABLET ONCE DAILY 30 tablet 11    busPIRone (BUSPAR) 30 MG tablet Take 30 mg by mouth 2 times daily       aspirin EC 81 MG EC tablet Take 1 tablet by mouth daily 30 tablet 3    ARIPiprazole (ABILIFY) 10 MG tablet Take 10 mg by mouth daily       lamoTRIgine (LAMICTAL) 150 MG tablet Take 200 mg by mouth 2 times daily          Objective: Asleep; F/C; bilateral lower extremities dry dressing and wrapped in roll gauze    /62   Pulse 99   Temp 97.6 °F (36.4 °C) (Oral)   Resp 14   Ht 5' 4.5\" (1.638 m)   Wt 245 lb (111.1 kg)   LMP 10/24/2012   SpO2 97%   BMI 41.40 kg/m²     LABS:  WBC:    Lab Results   Component Value Date    WBC 17.5 08/05/2020     H/H:    Lab Results L97.921    Ulcer of right leg, limited to breakdown of skin ([facility] [address] L97.911    Arthritis M19.90    Cardiomyopathy ([facility] [address] I42.9    Chronic systolic heart failure ([facility]) I50.22    Acute on chronic renal insufficiency N28.9, N18.9    Peripheral venous insufficiency I87.2    Diabetic polyneuropathy associated with type 2 diabetes mellitus ([facility]) E11.42    Atherosclerosis of native artery of both lower extremities with bilateral ulceration of calves ([facility]) I70.232, I70.242    Renal artery stenosis ([facility]) I70.1    Traumatic perinephric hematoma, left, initial encounter S37.092A    Aortocoronary bypass status Z95.1       Measurements:  Wound 01/17/20 Toe (Comment  which one) Anterior; Left Closed brown plantar 3rd toe (Active)   Number of days: 200       Wound 01/20/20 Toe (Comment  which one) Anterior; Left 1st dorsal toe lateral edge of nail, brown & dry (Active)   Number of days: 198       Wound 03/10/20 Toe (Comment  which one) Anterior; Left Unstageable (Active)   Number of days: 148       Wound 03/27/20 Foot Right;Plantar (Active)   Number of days: 131       Wound 04/27/20 Chest Right;Upper (Active)   Number of days: 99       Wound 06/18/20 #1, left lower leg cluster, venous, partial thickness, onset 6/1/2020 (Active)   Wound Image   08/05/20 1718   Wound Venous 08/05/20 1718   Dressing Status Clean;Dry; Intact 08/05/20 1718   Dressing Changed Changed/New 08/05/20 1718   Dressing/Treatment Pharmaceutical agent (see MAR);4x4;Roll gauze 08/05/20 1718   Wound Cleansed Rinsed/Irrigated with saline 08/05/20 1718   Dressing Change Due 08/06/20 08/05/20 1718   Wound Length (cm) 4 cm 08/05/20 1718   Wound Width (cm) 1.5 cm 08/05/20 1718   Wound Depth (cm) 0 cm 08/05/20 1718   Wound Surface Area (cm^2) 6 cm^2 08/05/20 1718   Change in Wound Size % (l*w) 98.6 08/05/20 1718   Wound Volume (cm^3) 0 cm^3 08/05/20 1718   Wound Healing % 100 08/05/20 1718   Post-Procedure Length (cm) 1 cm 07/23/20 1315   Post-Procedure Width (cm) ___ O'Clock 0 07/16/20 1303   Undermining Starts ___ O'Clock 0 07/16/20 1303   Undermining Ends___ O'Clock 0 07/16/20 1303   Undermining Maxium Distance (cm) 0 07/23/20 1253   Wound Assessment Pink;Red;Yellow 08/05/20 1718   Drainage Amount None 08/05/20 1718   Drainage Description Serosanguinous 07/30/20 1308   Odor None 08/05/20 1718   Margins Unattached edges 08/05/20 1718   Shaye-wound Assessment Dry; Intact 08/05/20 1718   Pink%Wound Bed 25 08/05/20 1718   Red%Wound Bed 35 08/05/20 1718   Yellow%Wound Bed 40 08/05/20 1718   Culture Taken No 08/05/20 1718   Number of days: 48    R Lower Leg:             Wound 07/16/20 #3, left second toe, DFU, scott 1, onset 5/1/2020 (Active)   Wound Image   08/05/20 1718   Wound Diabetic 08/05/20 1718   Dressing Status Clean;Dry; Intact 08/05/20 1718   Dressing Changed Changed/New 08/05/20 1718   Dressing/Treatment Betadine swabs 08/05/20 1718   Wound Cleansed Rinsed/Irrigated with saline 08/05/20 1718   Dressing Change Due 08/06/20 08/05/20 1718   Wound Length (cm) 1 cm 08/05/20 1718   Wound Width (cm) 1.4 cm 08/05/20 1718   Wound Depth (cm) 0 cm 08/05/20 1718   Wound Surface Area (cm^2) 1.4 cm^2 08/05/20 1718   Change in Wound Size % (l*w) -180 08/05/20 1718   Wound Volume (cm^3) 0 cm^3 08/05/20 1718   Wound Healing % 100 08/05/20 1718   Distance Tunneling (cm) 0 cm 07/23/20 1302   Tunneling Position ___ O'Clock 0 07/16/20 1306   Undermining Starts ___ O'Clock 0 07/16/20 1306   Undermining Ends___ O'Clock 0 07/16/20 1306   Undermining Maxium Distance (cm) 0 07/23/20 1302   Wound Assessment Dry; Intact; [place] 08/05/20 1718   Drainage Amount None 08/05/20 1718   Odor None 08/05/20 1718   Margins Attached edges 08/05/20 1718   Shaye-wound Assessment Dry; Intact 08/05/20 1718   Other%Wound Bed 100 08/05/20 1718   Culture Taken No 08/05/20 1718   Number of days: 20    L 2nd Toe:         Wound 07/30/20 #4, Left lateral foot, diabetic foot ulcer, Scott 1, Onset 7/25/20 (Active)   Wound Image   08/05/20 1718   Wound Diabetic 08/05/20 1718   Dressing Status Clean;Dry; Intact 08/05/20 1718   Dressing Changed Changed/New 08/05/20 1718   Dressing/Treatment Betadine swabs 08/05/20 1718   Wound Cleansed Rinsed/Irrigated with saline 08/05/20 1718   Dressing Change Due 08/06/20 08/05/20 1718   Wound Length (cm) 0.4 cm 08/05/20 1718   Wound Width (cm) 1 cm 08/05/20 1718   Wound Depth (cm) 0 cm 08/05/20 1718   Wound Surface Area (cm^2) 0.4 cm^2 08/05/20 1718   Change in Wound Size % (l*w) 60 08/05/20 1718   Wound Volume (cm^3) 0 cm^3 08/05/20 1718   Wound Healing % 100 08/05/20 1718   Wound Assessment Dry; Intact; [place] 08/05/20 1718   Drainage Amount None 08/05/20 1718   Odor None 08/05/20 1718   Margins Defined edges 08/05/20 1718   Shaye-wound Assessment Dry; Intact 08/05/20 1718   Other%Wound Bed 100 08/05/20 1718   Culture Taken No 08/05/20 1718   Number of days: 6    L Foot Lateral:         Wound 08/05/20 Buttocks Inner;Left (Active)   Wound Image   08/05/20 0300   Dressing Status Clean;Dry; Intact 08/05/20 1115   Dressing/Treatment Dry dressing 08/05/20 1115   Dressing Change Due 08/06/20 08/05/20 1115   Number of days: 0     Incision 01/27/20 Sternum (Active)   Number of days: 191       Incision 03/10/20 Sternum (Active)   Number of days: 148     Response to treatment:  Well tolerated by patient.      Pain Assessment:  Severity:  0 / 10  Quality of pain: N/A  Wound Pain Timing/Severity: none  Premedicated: No    Plan   Plan of Care: Wound 07/30/20 #4, Left lateral foot, diabetic foot ulcer, Scott 1, Onset 7/25/20-Dressing/Treatment: Betadine swabs  Wound 06/18/20 #1, left lower leg cluster, venous, partial thickness, onset 6/1/2020-Dressing/Treatment: Pharmaceutical agent (see MAR), 4x4, Roll gauze(Triad)  Wound 06/18/20 #2, right lower leg cluster, venous, full thickness, onset 6/1/2020-Dressing/Treatment: Pharmaceutical agent (see MAR), 4x4, Roll gauze(Triad)  Wound 07/16/20 #3, left second toe, DFU, godwin 1, onset 5/1/2020-Dressing/Treatment: Betadine swabs  Wound 08/05/20 Buttocks Inner;Left-Dressing/Treatment: Dry dressing   Recommendation: L Foot Lateral and L 2nd Toe - paint with betadine daily  R Lower Leg and L Lower Leg Venous Ulcers -clean with NSS; pat dry; apply Triad; cover with 4x4; roll gauze from toes- knee; change daily  Call Wound Care for deterioration [phone]; pager [phone]    Specialty Bed Required : Yes   [x] Low Air Loss   [x] Pressure Redistribution  [] Fluid Immersion  [] Bariatric  [] Total Pressure Relief  [] Other:     Current Diet: DIET CARDIAC; Carb Control: 5 carb choices (75 gms)/meal  Dietician consult:  No    Discharge Plan:  Placement for patient upon discharge: home with support    Patient appropriate for Outpatient [address]: Yes current patient at [address] [facility]    Referrals:  [x]  following  [] [address] [facility]  [] Supplies  [] Other    Patient/Caregiver Teaching:  Level of patient/caregiver understanding able to:   [] Indicates understanding       [] Needs reinforcement  [] Unsuccessful      [] Verbal Understanding  [] Demonstrated understanding       [] No evidence of learning  [] Refused teaching         [] N/A       Electronically signed by Tiburcio Watts RN, CWOCN on 8/5/2020 at 5:27 PM

## 2020-08-05 NOTE — CONSULTS
PULMONARY AND CRITICAL CARE INPATIENT CONSULTATION      8/5/2020    Patient Name:  Debbie De Souza       1965       Evaluation was requested by Dr. Vikki Stovall regarding critical care monitoring with serial H/H.    HPI: Patient is a 62 y.o. female with a pmhx of T2DM, PAD, CAD (s/p CABG 01/27/2020), CKD stage 3, and chronic systolic CHF (EF 75-58%). She has had persistent lower extremity edema which is hard to control as well as pulmonary edema, and underwent angioplasty and stenting of left renal artery stenosis yesterday  (08/04) with Dr. Marilee Stephenson to help better control her blood pressure and edema. After she was discharged, she developed increasingly severe left flank pain and pressure that wraps around to her abdomen, as well as nausea and vomiting. CT abdomen and pelvis in the ER revealed a large left actively bleeding perinephric hematoma measuring at least 75aje53.3cm x15cm that was compressing and displacing the left kidney and extends into the retroperitoneum. Patient was admitted to the ICU for monitoring of the perinephric hematoma with serial H/H, in hopes that it would tamponade. She received 2U of PRBC overnight. Patient reports that she quit smoking almost a year ago. She has CLINT for which she used to use a CPAP but was noncompliant with it. She also has chronic bronchitis with probably emphysema and uses Spiriva daily with albuterol PRN. Invasive Lines: None at time of exam            IV:  )   dextrose           ROS:   Constitutional: Denies fatigue, insomnia, fever, or chills. Eyes: No pain, no blurring, no redness. ENT: No dizziness, ear ringing, no change in hearing  Cardiovascular: No palpitation, no chest pain, no lightheadedness. Respiratory: Shortness of breath because breathing makes the left flank pain worse  GI: +abdominal pain, nausea and vomiting. No diarrhea, indigestion, reflux symptoms. : +flank pain.  No dysuria, frequency, cloudy urine, or bloody urine.  Musculoskeletal: No muscle soreness. No joints swelling. Skin: +chronic LE ulcers (sees wound care)  Neurologic: +neuropathy LE. Denies dizziness, weakness. Endocrine: No heat nor cold intolerance. Psychiatric: Denies depression, anxiety.     Patient Active Problem List    Diagnosis Date Noted    Mitral valve regurgitation 05/24/2018     Priority: High     Class: Acute    CAD (coronary artery disease)      Priority: High    Acute kidney injury superimposed on CKD ([address] 08/05/2020    Leukocytosis 08/05/2020    Acute blood loss anemia 08/05/2020    DM (diabetes mellitus), secondary uncontrolled ([address] 08/05/2020    Morbid obesity with BMI of 40.0-44.9, adult ([address] 08/05/2020    Multiple wounds of skin 08/05/2020    Traumatic perinephric hematoma, left, initial encounter 08/04/2020    Aortocoronary bypass status 08/04/2020    Renal artery stenosis (HCC)     Atherosclerosis of native artery of both lower extremities with bilateral ulceration of calves ([address]     Peripheral venous insufficiency 07/02/2020    Diabetic polyneuropathy associated with type 2 diabetes mellitus ([address] 07/02/2020    Acute on chronic renal insufficiency     Bilateral lower extremity edema 06/22/2020    Habitual self-excoriation 06/22/2020    Ulcer of left lower leg, limited to breakdown of skin ([address] 06/22/2020    Ulcer of right leg, limited to breakdown of skin ([address] 06/22/2020    Arthritis     Cardiomyopathy (HCC)     Hypotension - chronic     Pulmonary hypertension ([address]     Pulmonary nodule 02/28/2020    Class 2 severe obesity due to excess calories with serious comorbidity and body mass index (BMI) of 39.0 to 39.9 in adult ([address] 02/28/2020    Bipolar disorder ([address] 02/28/2020    Chronic systolic heart failure ([address] 02/21/2020    Dyslipidemia     Diabetic ulcer of left foot associated with type 2 diabetes mellitus, limited to breakdown of skin ([address] 01/18/2020    Acute on chronic systolic congestive heart failure ([facility] 01/10/2020    Vitamin D deficiency 10/29/2019    COPD (chronic obstructive pulmonary disease) ([facility] 05/17/2019    Claudication in peripheral vascular disease ([facility] 05/16/2019    Stage 3 chronic kidney disease ([facility] 01/09/2019    Hyponatremia 05/23/2018    Iron deficiency anemia 05/23/2018    GERD (gastroesophageal reflux disease)     Anxiety and depression     PAD (peripheral artery disease) [facility]     Type 2 diabetes mellitus with diabetic polyneuropathy, with long-term current use of insulin ([facility] 12/10/2015    CLINT (obstructive sleep apnea) 12/10/2015    Tobacco abuse disorder 12/10/2015    Abel's esophagus 04/29/2013    Essential hypertension 04/13/2011    Viral hepatitis C 03/10/2010       Past Medical History:   Diagnosis Date    Acute kidney injury ([facility] 12/25/2019    Acute on chronic congestive heart failure (HCC)     Alcohol dependence ([facility] 3/10/2010    Bipolar 1 disorder ([facility]     CAD (coronary artery disease)     Cardiomyopathy ([facility] 06/29/2020    EF= 25%    Cellulitis 6/3/2020    CHF (congestive heart failure) ([facility])     COPD (chronic obstructive pulmonary disease) [facility]     Diabetic ulcer of left foot associated with type 2 diabetes mellitus ([facility] 1/18/2020    Diabetic ulcer of toe of right foot associated with type 2 diabetes mellitus ([facility] 1/18/2020    GERD (gastroesophageal reflux disease)     High cholesterol     HTN (hypertension)     Kidney disease, chronic, stage II (mild, EGFR 60+ ml/min)     MI (myocardial infarction) ([facility] 10/07/2019    NSTEMI    Positive FIT (fecal immunochemical test) 2/28/2020    Pulmonary nodule     PVD (peripheral vascular disease) ([facility]         Past Surgical History:   Procedure Laterality Date    ARTERIAL BYPASS SURGRY Left 01/06/2016    Axillary/Subclavian to Brachial Bypass w/reversed SVG    CARDIAC CATHETERIZATION  03/27/2018    Dr. Ej Longo - at [address] 01/20/2020    Dr. Felipa Brunson      pancreatitis post surgery    CORONARY ANGIOPLASTY WITH STENT PLACEMENT  03/16/2018    MELODY- 3.5 x 38 and 3.5 x 20 to Cx    CORONARY ANGIOPLASTY WITH STENT PLACEMENT  01/03/2018    MELODY- 3.0 x 38 and 2.75 x 26 and 2.75 x 8 and 2.75 x 22 to the LAD    CORONARY ANGIOPLASTY WITH STENT PLACEMENT  10/07/2019    MELODY- 2.5 x 8 to [address] GRAFT N/A 1/27/2020    CORONARY ARTERY BYPASS GRAFTING X 1, ON PUMP BEATING HEART, INTERNAL MAMMARY ARTERY TO LEFT ANTERIOR DESCENDING ARTERY, VAS CATH INSERTION, URI, PLATELET GEL APPLICATION, 5 LEVEL BILATERAL INTERCOSTAL NERVE BLOCK, STERNAL PLATING performed by Sole Ruelas MD at [address] Right 5/16/2019    fem-pop, performed by Lexy Oquendo MD at [address]  02/14/2019    CardioMEMs insertion for CHF    ROTATOR CUFF REPAIR Left 04/22/2016    TONSILLECTOMY      TRANSESOPHAGEAL ECHOCARDIOGRAM  01/26/2020    TRANSLUMINAL ANGIOPLASTY Left 10/08/2019    with stenting, at [facility]    TRANSLUMINAL ANGIOPLASTY Left 04/29/2020    of the [facility] re-occlusion    TUMOR EXCISION      benign tumors X 2 chest & axilla    UPPER GASTROINTESTINAL ENDOSCOPY  4/25/2013    BX barretts, HH, gastritis    UPPER GASTROINTESTINAL ENDOSCOPY  12/10/2015    UPPER GASTROINTESTINAL ENDOSCOPY  01/06/2017    Gastritis    VASCULAR SURGERY Left 12/08/2015    upper extremity and mesenteric angiogram (diagnostic only)    VASCULAR SURGERY Bilateral 07/07/2020    diagnostic lower extremity angiogram only        Family History   Problem Relation Age of Onset    Heart Disease Maternal Grandmother     Cancer Mother         lung and brain cancer    Cancer Maternal Aunt         lung and brain cancer        Social History     Tobacco Use    Smoking status: Current Every Day Smoker     Packs/day: 1.00     Years: 30.00     Pack years: 30.00     Types: Cigarettes     Last attempt to quit: 8/1/2019 Years since quittin.0    Smokeless tobacco: Never Used    Tobacco comment: trying to quit, one cigarette daily   Substance Use Topics    Alcohol use: No     Comment: quit 2006         Allergies   Allergen Reactions    Lisinopril Other (See Comments)     Severe hypotension         Vital Signs:  Blood pressure 115/70, pulse 98, temperature 97.9 °F (36.6 °C), temperature source Oral, resp. rate 19, height 5' 4.5\" (1.638 m), weight 245 lb (111.1 kg), last menstrual period 10/24/2012, SpO2 97 %, not currently breastfeeding.' on RA  Body mass index is 41.4 kg/m². CVP:      Intake/Output Summary (Last 24 hours) at 2020  Last data filed at 2020  Gross per 24 hour   Intake 3322 ml   Output 1735 ml   Net 1587 ml         PHYSICAL EXAM:  General:  Well nourished, well developed, in moderate acute distress 2/2 pain. Eyes:  No scleral icterus or periorbital edema. Head: Atraumatic, normocephalic. ENMT: No bleeding of lips or gums. Mucosa pink and moist. No ulceration. No oral candidiasis. Neck: No palpable goiter, no lymphadenopathy, no JVD. No tracheal deviation. No S/Q emphysema. No stiff neck. Lymphadenopathy: No cervical, supraclavicular adenopathy. CV: S1, S2, no murmurs, gallops, clicks or rubs. Pulses palpable and symmetrical, strong. Capillary refills in nail beds are brisk. Respiratory: No respiratory distress, normal respiratory effort, clear to auscultation. Chest: Equal rise and fall of chest.   GI: Abdomen soft. LUQ tenderness to palpation. +guarding and rebound tenderness. : Left-CVA tenderness. Lees cathether in place. Skin:  Multiple healed wounds on bilateral UE. Ulcers on bilateral LE, bandaged. Musculoskeletal: Supple, no contractures or muscle atrophy. No clubbing or cyanosis of nailbeds. Moderate pitting edema BLLE, patient states is chronic. Neuro: Detailed exam not performed, moves all extremities.      Results:  CBC:   Recent Labs     20  1616 08/05/20  0421 08/05/20  1045 08/05/20  1229 08/05/20  1803   WBC 15.6*  --  17.4* 17.5*  --   --    HGB 9.5*   < > 7.5* 9.3* 9.2* 9.3*   HCT 30.0*   < > 23.7* 28.7* 28.9* 29.2*   MCV 74.8*  --  74.0* 77.3*  --   --      --  366 353  --   --     < > = values in this interval not displayed. BMP:   Recent Labs     08/05/20  0421 08/05/20  1045 08/05/20  1906   * 129* 130*   K 3.3* 3.3* 3.5   CL 82* 87* 88*   CO2 30 31 31   PHOS 3.4  --   --    BUN 46* 46* 48*   CREATININE 1.2* 1.5* 1.6*       Imaging:  I have reviewed radiology images personally. Ct Abdomen Pelvis W Iv Contrast Additional Contrast? None    Result Date: 8/4/2020  1. Large left subcapsular and perinephric hematoma, with active bleeding. Hematoma measures at least 12 cm in AP dimension, 11.3 cm in transverse dimension, and 15 cm in cephalocaudal dimension, compressing and displacing the left kidney. The hemorrhage extends both superiorly and inferiorly, within the left retroperitoneum 2. Critical results were called by Dr. Rajesh Jamison to Dr Anthony Almonte on 8/4/2020 at 18:22.             ASSESSMENT AND PLAN:    Principal Problem:    Traumatic perinephric hematoma, left, initial encounter  Active Problems:    CAD (coronary artery disease)    Type 2 diabetes mellitus with diabetic polyneuropathy, with long-term current use of insulin (HCC)    Essential hypertension    CLINT (obstructive sleep apnea)    Tobacco abuse disorder    PAD (peripheral artery disease) (HCC)    GERD (gastroesophageal reflux disease)    Hyponatremia    Stage 3 chronic kidney disease (HCC)    COPD (chronic obstructive pulmonary disease) (HCC)    Cardiomyopathy (HCC)    Chronic systolic heart failure (HCC)    Aortocoronary bypass status    Acute kidney injury superimposed on CKD (HCC)    Leukocytosis    Acute blood loss anemia    DM (diabetes mellitus), secondary uncontrolled ([facility]    Morbid obesity with BMI of 40.0-44.9, adult ([address]    Multiple wounds of skin  Resolved demonstrative response to inhaled bronchodilators.     PLETHYSMOGRAPHY:  The total lung capacity is 4.23 liters or 79% of predicted. The expiratory reserve volume is 0.35, which is 40% of predicted.     DIFFUSION CAPACITY:  The diffusion capacity of carbon monoxide is 14.23 mL/min/mmHg, which is 59% of predicted.     FLOW VOLUME LOOPS:  The tracings show a restrictive defect pattern.     IMPRESSION:  1. There is no evidence of obstructive lung defect or response to inhaled bronchodilators. 2.  There is a mild restrictive ventilatory defect, which is likely secondary to the patient.s increased body mass index given the low expiratory reserve volume. 3.  There is a mild reduction in diffusion capacity. I have examined this patient and available medical records, including all the radiographic and all relevant lab data/studies personally on this date and have made the above observations, conclusions and recommendations. Have discussed with nursing staff, RT, patient, family:     Thank you for the consultation. We will continue to follow with you. Electronically signed by:  Alex Mcfadden MD    8/5/2020    8:48 PM.       PULMONARY/CCM ATTENDING NOTE    Patient seen and evaluated with resident physician   Plan of care discussed in detail  Agree with above assessment     In addition -         Presenting HPI: Patient is a 62 y.o. female with a pmhx of T2DM, PAD, CAD (s/p CABG 01/27/2020), CKD stage 3, and chronic systolic CHF (EF 16-77%). She has had persistent lower extremity edema which is hard to control as well as pulmonary edema, and underwent angioplasty and stenting of left renal artery stenosis yesterday  (08/04) with Dr. Pradeep Weber to help better control her blood pressure and edema. After she was discharged, she developed increasingly severe left flank pain and pressure that wraps around to her abdomen, as well as nausea and vomiting.  CT abdomen and pelvis in the ER revealed a large left actively bleeding  UPPER GASTROINTESTINAL ENDOSCOPY  2013    BX barretts, HH, gastritis    UPPER GASTROINTESTINAL ENDOSCOPY  12/10/2015    UPPER GASTROINTESTINAL ENDOSCOPY  2017    Gastritis    VASCULAR SURGERY Left 2015    upper extremity and mesenteric angiogram (diagnostic only)    VASCULAR SURGERY Bilateral 2020    diagnostic lower extremity angiogram only        Family History   Problem Relation Age of Onset    Heart Disease Maternal Grandmother     Cancer Mother         lung and brain cancer    Cancer Maternal Aunt         lung and brain cancer        Social History     Tobacco Use    Smoking status: Current Every Day Smoker     Packs/day: 1.00     Years: 30.00     Pack years: 30.00     Types: Cigarettes     Last attempt to quit: 2019     Years since quittin.0    Smokeless tobacco: Never Used    Tobacco comment: trying to quit, one cigarette daily   Substance Use Topics    Alcohol use: No     Comment: quit          Allergies   Allergen Reactions    Lisinopril Other (See Comments)     Severe hypotension               Physical Exam:  Blood pressure 115/70, pulse 98, temperature 97.9 °F (36.6 °C), temperature source Oral, resp. rate 19, height 5' 4.5\" (1.638 m), weight 245 lb (111.1 kg), last menstrual period 10/24/2012, SpO2 97 %, not currently breastfeeding.'     Constitutional:  No acute distress. HENT:  Oropharynx is clear and moist. No thyromegaly. Eyes:  Conjunctivae are normal. Pupils equal, round, and reactive to light. No scleral icterus. Neck: . No tracheal deviation present. No obvious thyroid mass. Short enla=rged neck   Cardiovascular: Normal rate, regular rhythm, normal heart sounds. No right ventricular heave. No lower extremity edema. Pulmonary/Chest: No wheezes. No rales. Chest wall is not dull to percussion. No accessory muscle usage or stridor. Decreased BSI   Abdominal: Soft. Bowel sounds present. No distension or hernia. (+)  tenderness. Musculoskeletal: No cyanosis. No clubbing. No obvious joint deformity. Lymphadenopathy: No cervical or supraclavicular adenopathy. Skin: Skin is warm and dry. venous ulcers lower legs; Left foot laterla and 2nd toe diabetic ulcers  Psychiatric: Normal mood and affect. Behavior is normal.  slight  anxiety. Neurologic: Alert, awake and oriented. PERRL. Speech fluent        Results:  CBC:   Recent Labs     08/04/20  1616  08/05/20  0421 08/05/20  1045 08/05/20  1229 08/05/20  1803   WBC 15.6*  --  17.4* 17.5*  --   --    HGB 9.5*   < > 7.5* 9.3* 9.2* 9.3*   HCT 30.0*   < > 23.7* 28.7* 28.9* 29.2*   MCV 74.8*  --  74.0* 77.3*  --   --      --  366 353  --   --     < > = values in this interval not displayed. BMP:   Recent Labs     08/05/20  0421 08/05/20  1045 08/05/20  1906   * 129* 130*   K 3.3* 3.3* 3.5   CL 82* 87* 88*   CO2 30 31 31   PHOS 3.4  --   --    BUN 46* 46* 48*   CREATININE 1.2* 1.5* 1.6*     LIVER PROFILE:   Recent Labs     08/04/20  1616 08/05/20  0421   AST 13* 13*   ALT 10 8*   LIPASE 38.0  --    BILITOT 1.4* 1.0   ALKPHOS 146* 129     PT/INR:   Recent Labs     08/04/20  0700 08/05/20  0421   PROTIME 14.1* 14.7*   INR 1.21* 1.26*     APTT: No results for input(s): APTT in the last 72 hours. UA:  Recent Labs     08/04/20  1900   COLORU Straw   PHUR 7.5   WBCUA None seen   RBCUA 3-4   CLARITYU Clear   SPECGRAV 1.010   LEUKOCYTESUR Negative   UROBILINOGEN 0.2   BILIRUBINUR Negative   BLOODU TRACE-INTACT*   GLUCOSEU Negative   AMORPHOUS Rare       Imaging:  I have reviewed radiology images personally. CT ABDOMEN PELVIS W IV CONTRAST Additional Contrast? None   Final Result   1. Large left subcapsular and perinephric hematoma, with active bleeding. Hematoma measures at least 12 cm in AP dimension, 11.3 cm in transverse   dimension, and 15 cm in cephalocaudal dimension, compressing and displacing   the left kidney.   The hemorrhage extends both superiorly and inferiorly, within the left retroperitoneum   2. Critical results were called by Dr. Rajesh Jamison to Dr Anthony Almonte on 8/4/2020   at 18:22. Ct Abdomen Pelvis W Iv Contrast Additional Contrast? None    Result Date: 8/4/2020  EXAMINATION: CT OF THE ABDOMEN AND PELVIS WITH CONTRAST 8/4/2020 5:42 pm TECHNIQUE: CT of the abdomen and pelvis was performed with the administration of intravenous contrast. Multiplanar reformatted images are provided for review. Dose modulation, iterative reconstruction, and/or weight based adjustment of the mA/kV was utilized to reduce the radiation dose to as low as reasonably achievable. COMPARISON: March 11, 2020 HISTORY: ORDERING SYSTEM PROVIDED HISTORY: Left flank pain, recent ureteral stent TECHNOLOGIST PROVIDED HISTORY: Reason for exam:->Left flank pain, recent ureteral stent Additional Contrast?->None Reason for Exam: Left sided flank pain. Ureteral stent placed this morning and d/c from hospital this morning. Started to have severe pain around 12pm today Acuity: Acute Type of Exam: Initial Relevant Medical/Surgical History: See epic for multiple history and surgeries FINDINGS: Lower Chest: Atelectatic changes seen within the lung bases bilaterally. A calcified granuloma is present within the left lung base. Organs: Liver is homogeneous. The gallbladder surgically absent. The pancreas, spleen, and adrenal glands are stable. Nodular thickening of adrenal glands is again noted, consistent with adenomatous changes. Right kidney is normal.  A large left perinephric hematoma is present, compressing and displacing the left kidney. Hematoma measures approximately 12 cm in AP dimension, 11.3 cm in transverse dimension, and approximately 15 cm in cephalocaudal dimension. Within the caudal portion of the hematoma, a bright blushes of contrast is present, consistent with active bleeding, likely arising from the lower pole of the kidney. This is best seen on axial images number 98 through 102. Hematoma is largely subcapsular in location. This can result in a page kidney. Hemorrhage is identified within the left perirenal space, with stranding throughout the perinephric fat. Hemorrhage extends posteriorly, into the left retroperitoneum small amount of hemorrhage is seen extending anteriorly into the anterior pararenal space. Hemorrhage extends from the perirenal space, into the left lower quadrant. Left renal vascular calcifications are again noted. GI/Bowel: Stomach is nondistended. Small and large bowel appear normal. Mild-to-moderate amount of stool is seen within the colon. Pelvis: Urinary bladder appears normal.  Uterus and adnexal regions appear normal. Peritoneum/Retroperitoneum: Stranding is seen within the left retroperitoneum, related to the large left subcapsular/perinephric hematoma extending into the anterior and posterior pararenal spaces. Atherosclerotic changes are present within the abdominal aorta, without evidence of aneurysm formation. Bones/Soft Tissues: No acute osseous abnormality is present. 1. Large left subcapsular and perinephric hematoma, with active bleeding. Hematoma measures at least 12 cm in AP dimension, 11.3 cm in transverse dimension, and 15 cm in cephalocaudal dimension, compressing and displacing the left kidney. The hemorrhage extends both superiorly and inferiorly, within the left retroperitoneum 2. Critical results were called by Dr. Cordell José to Dr Little Valencia on 8/4/2020 at 18:22. Results for Stepan Redman (MRN [id number]) as of 8/5/2020 20:22   Ref.  Range 7/17/2020 12:30 8/4/2020 07:00 8/4/2020 16:16 8/4/2020 23:46 8/5/2020 04:21   WBC Latest Ref Range: 4.0 - 11.0 K/uL 6.2 8.5 15.6 (H)  17.4 (H)   RBC Latest Ref Range: 4.00 - 5.20 M/uL 4.74 4.73 4.00  3.20 (L)   Hemoglobin Quant Latest Ref Range: 12.0 - 16.0 g/dL 11.2 (L) 11.2 (L) 9.5 (L) 8.0 (L) 7.5 (L)   Hematocrit Latest Ref Range: 36.0 - 48.0 % 36.2 35.3 (L) 30.0 (L) 25.4 (L) 23.7 (L)   MCV Latest Ref Range: 80.0 - 100.0 fL 76.4 (L) 74.6 (L) 74.8 (L)  74.0 (L)   MCH Latest Ref Range: 26.0 - 34.0 pg 23.5 (L) 23.7 (L) 23.8 (L)  23.4 (L)   MCHC Latest Ref Range: 31.0 - 36.0 g/dL 30.8 (L) 31.8 31.8  31.6   MPV Latest Ref Range: 5.0 - 10.5 fL 8.5 8.5 8.9  8.9   RDW Latest Ref Range: 12.4 - 15.4 % 20.9 (H) 20.2 (H) 20.6 (H)  20.5 (H)   Platelet Count Latest Ref Range: 135 - 450 K/uL 361 349 387  366   Neutrophils % Latest Units: %   91.6  91.9     Results for Eladia Jiménez (MRN [id number]) as of 8/5/2020 20:22   Ref.  Range 7/17/2020 12:29 8/4/2020 07:00 8/4/2020 16:16 8/4/2020 19:23 8/4/2020 22:14 8/4/2020 23:46 8/5/2020 02:15 8/5/2020 04:21 8/5/2020 08:32 8/5/2020 10:45 8/5/2020 16:23   Sodium Latest Ref Range: 136 - 145 mmol/L 132 (L) 124 (L) 122 (L)     123 (L)  129 (L)    Potassium Latest Ref Range: 3.5 - 5.1 mmol/L 3.7 3.0 (L) 2.4 (LL)   2.4 (LL)  3.3 (L)  3.3 (L)    Chloride Latest Ref Range: 99 - 110 mmol/L 87 (L) 75 (L) 76 (L)     82 (L)  87 (L)    CO2 Latest Ref Range: 21 - 32 mmol/L 32 35 (H) 31     30  31    BUN Latest Ref Range: 7 - 20 mg/dL 43 (H) 49 (H) 50 (H)     46 (H)  46 (H)    Creatinine Latest Ref Range: 0.6 - 1.1 mg/dL 1.3 (H) 1.2 (H) 1.2 (H)     1.2 (H)  1.5 (H)    Anion Gap Latest Ref Range: 3 - 16  13 14 15     11  11    GFR Non- Latest Ref Range: >60  42 (A) 46 (A) 46 (A)     46 (A)  36 (A)    GFR  Latest Ref Range: >60  51 (A) 56 (A) 56 (A)     56 (A)  43 (A)    Magnesium Latest Ref Range: 1.80 - 2.40 mg/dL 1.90  1.70 (L)     2.00  2.20    Glucose Latest Ref Range: 70 - 99 mg/dL 47 (LL) 195 (H) 251 (H)     274 (H)  178 (H)    POC Glucose Latest Ref Range: 70 - 99 mg/dl    294 (H) 329 (H)  311 (H)  230 (H)  191 (H)   Calcium Latest Ref Range: 8.3 - 10.6 mg/dL 9.8 9.5 8.9     8.7  8.8    Phosphorus Latest Ref Range: 2.5 - 4.9 mg/dL        3.4      Total Protein Latest Ref Range: 6.4 - 8.2 g/dL   6.6     6.0 (L)          Echocardiogram:Conclusions Summary   Technically difficult examination. Limited only f/u for LVEF. and mitral regurgitation. The left ventricular systolic function is severely reduced with an ejection   fraction of 25 %. There is hypokinesis of the apex, apical lateral, apical septum and   anteroseptum walls. Moderate mitral regurgitation. Results for Livan Angel (MRN [id number]) as of 8/5/2020 20:22   Ref. Range 10/21/2019 12:15 1/10/2020 10:03 8/4/2020 16:16   TSH Latest Ref Range: 0.27 - 4.20 uIU/mL 3.35 2.03 1.47     PFT:  12/11/2018   SPIROMETRY:  The FEV1 is 1.18 liters or 43% of predicted. The FVC is 1.63 liters or 47% of predicted. The FEV1/FVC ratio is 72%. There is no demonstrative response to inhaled bronchodilators.     PLETHYSMOGRAPHY:  The total lung capacity is 4.23 liters or 79% of predicted. The expiratory reserve volume is 0.35, which is 40% of predicted.     DIFFUSION CAPACITY:  The diffusion capacity of carbon monoxide is 14.23 mL/min/mmHg, which is 59% of predicted.     FLOW VOLUME LOOPS:  The tracings show a restrictive defect pattern.     IMPRESSION:  1. There is no evidence of obstructive lung defect or response to inhaled bronchodilators. 2.  There is a mild restrictive ventilatory defect, which is likely secondary to the patient.s increased body mass index given the low expiratory reserve volume. 3.  There is a mild reduction in diffusion capacity.         Assessment:  Principal Problem:    Traumatic perinephric hematoma, left, initial encounter  Active Problems:    CAD (coronary artery disease)    Type 2 diabetes mellitus with diabetic polyneuropathy, with long-term current use of insulin ([facility])    Essential hypertension    CLINT (obstructive sleep apnea)    Tobacco abuse disorder    PAD (peripheral artery disease) (HCC)    GERD (gastroesophageal reflux disease)    Hyponatremia    Stage 3 chronic kidney disease (HCC)    COPD (chronic obstructive pulmonary disease) ([facility] [address]    Cardiomyopathy ([facility] [address]    Chronic systolic heart failure (HCC)    Aortocoronary bypass status    Acute kidney injury superimposed on CKD (HCC)    Leukocytosis    Acute blood loss anemia    DM (diabetes mellitus), secondary uncontrolled ([facility] [address]    Morbid obesity with BMI of 40.0-44.9, adult ([facility] [address]    Multiple wounds of skin  Resolved Problems:    * No resolved hospital problems.  *          Plan:   · O2 supplementation to keep SaO2 between 90-94% ONLY  · Monitor for hypoventilation  · May require NIPPV  · S/p 2 units of PRBCs  · Monitor H/H and hemodynamics closely  · Patient was evalyuated by vascular surgery and going for coil embolization  · Patient has worsening leukocytosis and going for interventions -would give empiric IV Rocephin and titration as per clinical status and c/s  · Monitor H/H   · Management of hyponatremia as per nephrology   · Monitor I/o and BMP  · Correct electrolytes on PRN basis   · Plavix on hold  · Lantus insulin as per BGM   · BGM with SSI   · Judicious use of analgesics   · Bronchodilators  · Monitor for any fluid overload   · ?need for B-blockers once hemodynamics has improved  · Recent TSH was normal   · Wound care as per wound care team  · Patient admits being non compliant which is adding to the complexity  · PUD prophylaxis     Case d/w ICU team     Further management as per perioperative course    Has potential for decompensation and needs to be monitored in ICU closely     Critical care time spent -35 minutes(exclusive of any procedures)        Electronically signed by:  Mis Posada MD    8/5/2020    8:48 PM.

## 2020-08-05 NOTE — PROGRESS NOTES
Vascular Surgery Progress Note      SUBJECTIVE:  C/o left flank pain. OBJECTIVE    Physical  CURRENT VITALS:  /72   Pulse 100   Temp 97.6 °F (36.4 °C) (Oral)   Resp 25   Ht 5' 4.5\" (1.638 m)   Wt 245 lb (111.1 kg)   LMP 10/24/2012   SpO2 98%   BMI 41.40 kg/m²   24 HR INTAKE/OUTPUT:    Intake/Output Summary (Last 24 hours) at 8/5/2020 0702  Last data filed at 8/5/2020 0615  Gross per 24 hour   Intake 404 ml   Output 1100 ml   Net -696 ml     Alert and oritented  Left flank tenderness  R groin without hematoma    Data  CBC:   Lab Results   Component Value Date    WBC 17.4 08/05/2020    RBC 3.20 08/05/2020    HGB 7.5 08/05/2020    HCT 23.7 08/05/2020    MCV 74.0 08/05/2020    MCH 23.4 08/05/2020    MCHC 31.6 08/05/2020    RDW 20.5 08/05/2020     08/05/2020    MPV 8.9 08/05/2020     BMP:    Lab Results   Component Value Date     08/05/2020    K 3.3 08/05/2020    K 4.6 07/01/2020    CL 82 08/05/2020    CO2 30 08/05/2020    BUN 46 08/05/2020    LABALBU 3.1 08/05/2020    CREATININE 1.2 08/05/2020    CALCIUM 8.7 08/05/2020    GFRAA 56 08/05/2020    LABGLOM 46 08/05/2020    GLUCOSE 274 08/05/2020         ASSESSMENT AND PLAN    S/P Renal angio complicated by perinephric hematoma. Probable continued slow bleed. Getting blood now. Will take back to Angio for poss embolization and placement central line. Discussed with patient- risks, benefits, and alternatives explained   and understood.      ASA 3 - Patient with moderate systemic disease with functional limitations    Mallampati Airway Assessment:  Mallampati Class III - (soft palate & base of uvula are visible)

## 2020-08-05 NOTE — ED NOTES
Patient transported to inpatient unit via stretcher. Patient care transferred to inpatient nurse at this time. Patient stable at time of transfer. Discussed during nurse to nurse report was, including but not limited to: client's purpose of admission and/or transfer, initial and current mental status, vital signs, medication interventions or procedures, abnormal test results, and significant events or changes that occurred during the admission. The receiving nurse was prompted to ask questions and informed that the current department staff could be contacted for additional questions if needed. Report discussed with Jana Arreguin.      Leoncio Jasmine RN  08/04/20 5310

## 2020-08-06 ENCOUNTER — HOSPITAL ENCOUNTER (OUTPATIENT)
Dept: WOUND CARE | Age: 55
Discharge: HOME | End: 2020-08-06
Payer: COMMERCIAL

## 2020-08-06 LAB
A/G RATIO: 1.1 (ref 1.1–2.2)
ALBUMIN SERPL-MCNC: 3.5 G/DL (ref 3.4–5)
ALP BLD-CCNC: 123 U/L (ref 40–129)
ALT SERPL-CCNC: 9 U/L (ref 10–40)
ANION GAP SERPL CALCULATED.3IONS-SCNC: 13 MMOL/L (ref 3–16)
AST SERPL-CCNC: 12 U/L (ref 15–37)
BASOPHILS ABSOLUTE: 0.1 K/UL (ref 0–0.2)
BASOPHILS RELATIVE PERCENT: 0.4 %
BILIRUB SERPL-MCNC: 1.2 MG/DL (ref 0–1)
BUN BLDV-MCNC: 52 MG/DL (ref 7–20)
CALCIUM SERPL-MCNC: 9.1 MG/DL (ref 8.3–10.6)
CHLORIDE BLD-SCNC: 87 MMOL/L (ref 99–110)
CO2: 29 MMOL/L (ref 21–32)
CREAT SERPL-MCNC: 1.5 MG/DL (ref 0.6–1.1)
EOSINOPHILS ABSOLUTE: 0 K/UL (ref 0–0.6)
EOSINOPHILS RELATIVE PERCENT: 0.2 %
GFR AFRICAN AMERICAN: 43
GFR NON-AFRICAN AMERICAN: 36
GLOBULIN: 3.2 G/DL
GLUCOSE BLD-MCNC: 178 MG/DL (ref 70–99)
GLUCOSE BLD-MCNC: 180 MG/DL (ref 70–99)
GLUCOSE BLD-MCNC: 192 MG/DL (ref 70–99)
GLUCOSE BLD-MCNC: 194 MG/DL (ref 70–99)
GLUCOSE BLD-MCNC: 197 MG/DL (ref 70–99)
GLUCOSE BLD-MCNC: 265 MG/DL (ref 70–99)
HCT VFR BLD CALC: 25.8 % (ref 36–48)
HCT VFR BLD CALC: 27.3 % (ref 36–48)
HEMOGLOBIN: 8.3 G/DL (ref 12–16)
HEMOGLOBIN: 8.8 G/DL (ref 12–16)
INR BLD: 1.24 (ref 0.86–1.14)
LYMPHOCYTES ABSOLUTE: 0.8 K/UL (ref 1–5.1)
LYMPHOCYTES RELATIVE PERCENT: 5.1 %
MAGNESIUM: 2.3 MG/DL (ref 1.8–2.4)
MCH RBC QN AUTO: 25 PG (ref 26–34)
MCHC RBC AUTO-ENTMCNC: 32 G/DL (ref 31–36)
MCV RBC AUTO: 78 FL (ref 80–100)
MONOCYTES ABSOLUTE: 1.1 K/UL (ref 0–1.3)
MONOCYTES RELATIVE PERCENT: 7.5 %
NEUTROPHILS ABSOLUTE: 13.2 K/UL (ref 1.7–7.7)
NEUTROPHILS RELATIVE PERCENT: 86.8 %
PDW BLD-RTO: 20.2 % (ref 12.4–15.4)
PERFORMED ON: ABNORMAL
PHOSPHORUS: 3.8 MG/DL (ref 2.5–4.9)
PLATELET # BLD: 345 K/UL (ref 135–450)
PMV BLD AUTO: 8.4 FL (ref 5–10.5)
POTASSIUM SERPL-SCNC: 4.1 MMOL/L (ref 3.5–5.1)
PROTHROMBIN TIME: 14.4 SEC (ref 10–13.2)
RBC # BLD: 3.31 M/UL (ref 4–5.2)
SODIUM BLD-SCNC: 129 MMOL/L (ref 136–145)
TOTAL PROTEIN: 6.7 G/DL (ref 6.4–8.2)
WBC # BLD: 15.2 K/UL (ref 4–11)

## 2020-08-06 PROCEDURE — 80053 COMPREHEN METABOLIC PANEL: CPT

## 2020-08-06 PROCEDURE — 2580000003 HC RX 258: Performed by: INTERNAL MEDICINE

## 2020-08-06 PROCEDURE — 85025 COMPLETE CBC W/AUTO DIFF WBC: CPT

## 2020-08-06 PROCEDURE — 6360000002 HC RX W HCPCS: Performed by: SURGERY

## 2020-08-06 PROCEDURE — 6370000000 HC RX 637 (ALT 250 FOR IP): Performed by: INTERNAL MEDICINE

## 2020-08-06 PROCEDURE — 99233 SBSQ HOSP IP/OBS HIGH 50: CPT | Performed by: INTERNAL MEDICINE

## 2020-08-06 PROCEDURE — 2060000000 HC ICU INTERMEDIATE R&B

## 2020-08-06 PROCEDURE — 94640 AIRWAY INHALATION TREATMENT: CPT

## 2020-08-06 PROCEDURE — 83735 ASSAY OF MAGNESIUM: CPT

## 2020-08-06 PROCEDURE — 6370000000 HC RX 637 (ALT 250 FOR IP): Performed by: SURGERY

## 2020-08-06 PROCEDURE — 85610 PROTHROMBIN TIME: CPT

## 2020-08-06 PROCEDURE — 6360000002 HC RX W HCPCS: Performed by: INTERNAL MEDICINE

## 2020-08-06 PROCEDURE — 2500000003 HC RX 250 WO HCPCS: Performed by: INTERNAL MEDICINE

## 2020-08-06 PROCEDURE — 84100 ASSAY OF PHOSPHORUS: CPT

## 2020-08-06 RX ORDER — OXYCODONE HYDROCHLORIDE 5 MG/1
10 TABLET ORAL EVERY 4 HOURS PRN
Status: DISCONTINUED | OUTPATIENT
Start: 2020-08-06 | End: 2020-08-12 | Stop reason: HOSPADM

## 2020-08-06 RX ORDER — FERROUS SULFATE 325(65) MG
325 TABLET ORAL 2 TIMES DAILY WITH MEALS
Status: DISCONTINUED | OUTPATIENT
Start: 2020-08-06 | End: 2020-08-12 | Stop reason: HOSPADM

## 2020-08-06 RX ORDER — PROCHLORPERAZINE EDISYLATE 5 MG/ML
5 INJECTION INTRAMUSCULAR; INTRAVENOUS EVERY 6 HOURS PRN
Status: DISCONTINUED | OUTPATIENT
Start: 2020-08-06 | End: 2020-08-06

## 2020-08-06 RX ORDER — HYDROMORPHONE HCL 110MG/55ML
0.5 PATIENT CONTROLLED ANALGESIA SYRINGE INTRAVENOUS EVERY 4 HOURS PRN
Status: DISCONTINUED | OUTPATIENT
Start: 2020-08-06 | End: 2020-08-12 | Stop reason: HOSPADM

## 2020-08-06 RX ORDER — ONDANSETRON 2 MG/ML
4 INJECTION INTRAMUSCULAR; INTRAVENOUS EVERY 6 HOURS PRN
Status: DISCONTINUED | OUTPATIENT
Start: 2020-08-06 | End: 2020-08-12 | Stop reason: HOSPADM

## 2020-08-06 RX ORDER — OXYCODONE HYDROCHLORIDE 5 MG/1
5 TABLET ORAL EVERY 4 HOURS PRN
Status: DISCONTINUED | OUTPATIENT
Start: 2020-08-06 | End: 2020-08-12 | Stop reason: HOSPADM

## 2020-08-06 RX ADMIN — ATORVASTATIN CALCIUM 80 MG: 80 TABLET, FILM COATED ORAL at 21:22

## 2020-08-06 RX ADMIN — Medication: at 09:00

## 2020-08-06 RX ADMIN — TIOTROPIUM BROMIDE INHALATION SPRAY 2 PUFF: 3.12 SPRAY, METERED RESPIRATORY (INHALATION) at 08:00

## 2020-08-06 RX ADMIN — INSULIN LISPRO 2 UNITS: 100 INJECTION, SOLUTION INTRAVENOUS; SUBCUTANEOUS at 01:39

## 2020-08-06 RX ADMIN — CLOPIDOGREL BISULFATE 75 MG: 75 TABLET ORAL at 08:44

## 2020-08-06 RX ADMIN — PANTOPRAZOLE SODIUM 40 MG: 40 TABLET, DELAYED RELEASE ORAL at 08:45

## 2020-08-06 RX ADMIN — INSULIN LISPRO 6 UNITS: 100 INJECTION, SOLUTION INTRAVENOUS; SUBCUTANEOUS at 09:00

## 2020-08-06 RX ADMIN — HYDROMORPHONE HYDROCHLORIDE 0.5 MG: 2 INJECTION, SOLUTION INTRAMUSCULAR; INTRAVENOUS; SUBCUTANEOUS at 04:48

## 2020-08-06 RX ADMIN — ARIPIPRAZOLE 10 MG: 10 TABLET ORAL at 08:53

## 2020-08-06 RX ADMIN — CEFTRIAXONE SODIUM 1 G: 1 INJECTION, POWDER, FOR SOLUTION INTRAMUSCULAR; INTRAVENOUS at 21:22

## 2020-08-06 RX ADMIN — OXYCODONE HYDROCHLORIDE 10 MG: 5 TABLET ORAL at 22:06

## 2020-08-06 RX ADMIN — INSULIN LISPRO 2 UNITS: 100 INJECTION, SOLUTION INTRAVENOUS; SUBCUTANEOUS at 21:44

## 2020-08-06 RX ADMIN — MUPIROCIN: 20 OINTMENT TOPICAL at 21:23

## 2020-08-06 RX ADMIN — BUSPIRONE HYDROCHLORIDE 30 MG: 5 TABLET ORAL at 21:21

## 2020-08-06 RX ADMIN — HYDROMORPHONE HYDROCHLORIDE 0.5 MG: 2 INJECTION, SOLUTION INTRAMUSCULAR; INTRAVENOUS; SUBCUTANEOUS at 23:34

## 2020-08-06 RX ADMIN — Medication 10 ML: at 21:22

## 2020-08-06 RX ADMIN — PANTOPRAZOLE SODIUM 40 MG: 40 TABLET, DELAYED RELEASE ORAL at 18:24

## 2020-08-06 RX ADMIN — BUSPIRONE HYDROCHLORIDE 30 MG: 5 TABLET ORAL at 08:43

## 2020-08-06 RX ADMIN — ONDANSETRON 4 MG: 2 INJECTION INTRAMUSCULAR; INTRAVENOUS at 08:42

## 2020-08-06 RX ADMIN — OXYCODONE 5 MG: 5 TABLET ORAL at 13:45

## 2020-08-06 RX ADMIN — INSULIN LISPRO 2 UNITS: 100 INJECTION, SOLUTION INTRAVENOUS; SUBCUTANEOUS at 06:13

## 2020-08-06 RX ADMIN — HYDROMORPHONE HYDROCHLORIDE 0.5 MG: 2 INJECTION, SOLUTION INTRAMUSCULAR; INTRAVENOUS; SUBCUTANEOUS at 08:46

## 2020-08-06 RX ADMIN — LAMOTRIGINE 200 MG: 100 TABLET ORAL at 21:22

## 2020-08-06 RX ADMIN — LAMOTRIGINE 200 MG: 100 TABLET ORAL at 08:45

## 2020-08-06 RX ADMIN — OXYCODONE HYDROCHLORIDE 10 MG: 5 TABLET ORAL at 18:22

## 2020-08-06 RX ADMIN — BENZTROPINE MESYLATE 1 MG: 1 TABLET ORAL at 21:30

## 2020-08-06 RX ADMIN — ONDANSETRON 4 MG: 2 INJECTION INTRAMUSCULAR; INTRAVENOUS at 21:28

## 2020-08-06 RX ADMIN — INSULIN GLARGINE 24 UNITS: 100 INJECTION, SOLUTION SUBCUTANEOUS at 22:03

## 2020-08-06 RX ADMIN — FERROUS SULFATE TAB 325 MG (65 MG ELEMENTAL FE) 325 MG: 325 (65 FE) TAB at 08:45

## 2020-08-06 RX ADMIN — MUPIROCIN: 20 OINTMENT TOPICAL at 09:00

## 2020-08-06 RX ADMIN — ASPIRIN 81 MG: 81 TABLET ORAL at 08:45

## 2020-08-06 RX ADMIN — INSULIN LISPRO 2 UNITS: 100 INJECTION, SOLUTION INTRAVENOUS; SUBCUTANEOUS at 09:00

## 2020-08-06 ASSESSMENT — PAIN DESCRIPTION - PAIN TYPE: TYPE: ACUTE PAIN;CHRONIC PAIN

## 2020-08-06 ASSESSMENT — PAIN - FUNCTIONAL ASSESSMENT: PAIN_FUNCTIONAL_ASSESSMENT: PREVENTS OR INTERFERES SOME ACTIVE ACTIVITIES AND ADLS

## 2020-08-06 ASSESSMENT — PAIN SCALES - GENERAL
PAINLEVEL_OUTOF10: 10
PAINLEVEL_OUTOF10: 10
PAINLEVEL_OUTOF10: 6
PAINLEVEL_OUTOF10: 10
PAINLEVEL_OUTOF10: 8

## 2020-08-06 ASSESSMENT — PAIN DESCRIPTION - FREQUENCY: FREQUENCY: INTERMITTENT

## 2020-08-06 ASSESSMENT — PAIN DESCRIPTION - LOCATION: LOCATION: BACK

## 2020-08-06 NOTE — OP NOTE
[address]                                OPERATIVE REPORT    PATIENT NAME: Kartik BADILLO                    :        1965  MED REC NO:   [id number]                          ROOM:       2729  ACCOUNT NO:   [de-identified]                           ADMIT DATE: 2020  PROVIDER:     Pedro Benavidez MD    DATE OF PROCEDURE:  2020    PREOPERATIVE DIAGNOSIS:  Perinephric hematoma, status post left renal  artery angioplasty and stenting. POSTOPERATIVE DIAGNOSIS:  Perinephric hematoma with active arterial  extravasation. PROCEDURES PERFORMED:  1. Ultrasound-guided right femoral artery access. 2.  Second-order selective renal artery angiograms. 3.  Coil embolization of subsegmental renal artery branch vessel. 4.  Ultrasound-guided right internal jugular venous access. 5.  Placement of nontunneled central venous catheter into the right  internal jugular vein. ANESTHESIA:  Local with moderate monitored sedation. INDICATIONS:  The patient is a 66-year-old female who one day prior had  undergone left renal artery angioplasty and stenting. She presented  back with flank and abdominal pain. CT angiogram was performed showing  a large perinephric hematoma and evidence of some arterial contrast  extravasation. The patient was monitored in the ICU and had evidence of  continued decrease in hemoglobin as well as relative hypotension. She  was thus brought to the angio suite at this time to undergo angiogram  with possible coil embolization of any active bleeding. PROCEDURE:  The patient was brought to the angio suite, placed in the  supine position. Under my direct supervision, Versed and fentanyl were  administered for moderate sedation. The patient was monitored by an  independent trained nurse observer using continuous blood pressure, EKG  and pulse oximetry.   The right femoral region was prepped and draped in  sterile fashion. Ultrasound was used to identify the common femoral  artery which was pulsatile and patent. Under direct visualization, this  was accessed and a 6-Senegalese introducer sheath was placed. Through the  introducer sheath, a Bentson wire was advanced into the abdominal aorta. A JR-4 guide catheter was advanced over the wire. The Bentson wire was  then removed. Flush aortogram was performed identifying the location of  the left renal artery and renal stent. Using a ChoICE PT extra-support  wire, the lumen of the stent was accessed and the wire was advanced into  the mid left renal artery. The JR-4 catheter was engaged in the lumen  of the stents. A Progreat catheter was then advanced over the ChoICE PT  wire which was then removed and then these were used to cannulate the  inferior most segmental renal artery which had previously been accessed  the day before and this was presumably where the bleed was coming from. Injection was performed with the catheter in a subsegmental branch and  this did show evidence of active extravasation. The ChoICE PT wire was  removed and two Terumo coils were placed in this branch vessel. The  first was a CX 2 mm x 4 mm x 4 cm coil and a second was a AZUR HydroCoil  again measuring 2 mm x 4 cm. Repeat angiograms were performed showing  occlusion of the vessel with no further active extravasation. The  Progreat catheter and the JR-4 guide catheter were then removed. Retrograde right femoral angiogram was performed and a 6-Senegalese Mynx  closure device was then placed and deployed in a standard fashion  achieving excellent hemostasis after holding pressure for approximately  15 minutes. The right neck was then prepped and draped in a sterile  fashion. Using ultrasound, the internal jugular vein was identified. This was then accessed under direct ultrasound visualization.   The vein  was noted to be patent and compressible without any evidence of  thrombus. Under fluoroscopic imaging, a triple-lumen central venous  catheter was placed using a Seldinger technique. Catheter was advanced  to the junction of superior vena cava and the right atrium. The  catheter was then affixed to the skin using 2-0 silk sutures. Sterile  occlusive dressing was then applied. All lumens of the catheter  aspirated venous blood quite easily and flushed with heparinized saline  solution. X-ray confirmed appropriate placement without evidence of  pneumothorax. At the conclusion of procedure, the patient was  transferred to the intensive care unit in a stable condition. I spent  45 minutes face-to-face with the patient providing and directing  sedation. Digital copies of the ultrasound access images were saved and placed in the patient record.  Estimated blood loss throughout the procedure was minimal.        Palma Bernal MD    D: 08/05/2020 15:44:45       T: 08/05/2020 15:51:34     [email or web address]  Job#: [id number]     Doc#: [id number]    CC:

## 2020-08-06 NOTE — PROGRESS NOTES
INPATIENT PULMONARY CRITICAL CARE PROGRESS NOTE      Reason for visit    Hemorraghic shock     SUBJECTIVE: Patient when seen states that she is somewhat better as compared to yesterday, patient still has some cough but it is become more mucoid in nature, patient does not have any increasing shortness of breath or wheezing, patient states that her abdominal pain is less but she continues to have some back pain, patient does not have any significant nausea or vomiting, patient was afebrile and hemodynamically maintained, patient has sinus rhythm on the monitor which is wearing from normal sinus rhythm to sinus tachycardia, patient is hemodynamically maintained, patient's blood sugars are not optimally controlled, patient was not hypoxemic and was on room air oxygen with saturation of 94% when seen, patient states that she does not have any CPAP or BiPAP because she was not able to sleep long enough for care source to pay for it, patient underwent coiling without any apparent complications, patient's urine output is improving, patient has a cumulative fluid balance of +1.7 L, patient was alert and communicative, no other pertinent review of system of concern           Physical Exam:  Blood pressure 120/66, pulse 102, temperature 97.4 °F (36.3 °C), temperature source Oral, resp. rate 18, height 5' 4.5\" (1.638 m), weight 245 lb (111.1 kg), last menstrual period 10/24/2012, SpO2 94 %, not currently breastfeeding.'     Constitutional:  No acute distress. HENT:  Oropharynx is clear and moist. No thyromegaly. Eyes:  Conjunctivae are normal. Pupils equal, round, and reactive to light. No scleral icterus. Neck: . No tracheal deviation present. No obvious thyroid mass. Short enla=rged neck   Cardiovascular: Normal rate, regular rhythm, normal heart sounds. No right ventricular heave. No lower extremity edema. Pulmonary/Chest: No wheezes. No rales. Chest wall is not dull to percussion.   No accessory muscle usage or stridor. Decreased BSI   Abdominal: Soft. Bowel sounds present. No distension or hernia. (+)  tenderness(less)    Musculoskeletal: No cyanosis. No clubbing. No obvious joint deformity. Lymphadenopathy: No cervical or supraclavicular adenopathy. Skin: Skin is warm and dry. venous ulcers lower legs; Left foot laterla and 2nd toe diabetic ulcers  Psychiatric: Normal mood and affect. Behavior is normal.  slight  anxiety. Neurologic: Alert, awake and oriented. PERRL. Speech fluent            Results:  CBC:   Recent Labs     08/05/20  0421 08/05/20  1045  08/05/20  1803 08/05/20  2350 08/06/20  0615   WBC 17.4* 17.5*  --   --   --  15.2*   HGB 7.5* 9.3*   < > 9.3* 8.8* 8.3*   HCT 23.7* 28.7*   < > 29.2* 27.3* 25.8*   MCV 74.0* 77.3*  --   --   --  78.0*    353  --   --   --  345    < > = values in this interval not displayed. BMP:   Recent Labs     08/05/20  0421 08/05/20  1045 08/05/20  1906 08/06/20  0615   * 129* 130* 129*   K 3.3* 3.3* 3.5 4.1   CL 82* 87* 88* 87*   CO2 30 31 31 29   PHOS 3.4  --   --  3.8   BUN 46* 46* 48* 52*   CREATININE 1.2* 1.5* 1.6* 1.5*     LIVER PROFILE:   Recent Labs     08/04/20  1616 08/05/20  0421 08/06/20  0615   AST 13* 13* 12*   ALT 10 8* 9*   LIPASE 38.0  --   --    BILITOT 1.4* 1.0 1.2*   ALKPHOS 146* 129 123     PT/INR:   Recent Labs     08/04/20  0700 08/05/20  0421 08/06/20  0615   PROTIME 14.1* 14.7* 14.4*   INR 1.21* 1.26* 1.24*     APTT: No results for input(s): APTT in the last 72 hours. UA:  Recent Labs     08/04/20  1900   COLORU Straw   PHUR 7.5   WBCUA None seen   RBCUA 3-4   CLARITYU Clear   SPECGRAV 1.010   LEUKOCYTESUR Negative   UROBILINOGEN 0.2   BILIRUBINUR Negative   BLOODU TRACE-INTACT*   GLUCOSEU Negative   AMORPHOUS Rare       Imaging:  I have reviewed radiology images personally. CT ABDOMEN PELVIS W IV CONTRAST Additional Contrast? None   Final Result   1. Large left subcapsular and perinephric hematoma, with active bleeding.    Hematoma measures at least 12 cm in AP dimension, 11.3 cm in transverse   dimension, and 15 cm in cephalocaudal dimension, compressing and displacing   the left kidney. The hemorrhage extends both superiorly and inferiorly,   within the left retroperitoneum   2. Critical results were called by Dr. Nancy Moore to Dr Reece Burden on 8/4/2020   at 18:22. Results for Segun Zamora (MRN [id number]) as of 8/6/2020 15:06   Ref.  Range 8/5/2020 10:45 8/5/2020 12:29 8/5/2020 18:03 8/5/2020 23:50 8/6/2020 06:15   WBC Latest Ref Range: 4.0 - 11.0 K/uL 17.5 (H)    15.2 (H)   RBC Latest Ref Range: 4.00 - 5.20 M/uL 3.71 (L)    3.31 (L)   Hemoglobin Quant Latest Ref Range: 12.0 - 16.0 g/dL 9.3 (L) 9.2 (L) 9.3 (L) 8.8 (L) 8.3 (L)   Hematocrit Latest Ref Range: 36.0 - 48.0 % 28.7 (L) 28.9 (L) 29.2 (L) 27.3 (L) 25.8 (L)   MCV Latest Ref Range: 80.0 - 100.0 fL 77.3 (L)    78.0 (L)   MCH Latest Ref Range: 26.0 - 34.0 pg 25.0 (L)    25.0 (L)   MCHC Latest Ref Range: 31.0 - 36.0 g/dL 32.3    32.0   MPV Latest Ref Range: 5.0 - 10.5 fL 9.0    8.4   RDW Latest Ref Range: 12.4 - 15.4 % 19.9 (H)    20.2 (H)   Platelet Count Latest Ref Range: 135 - 450 K/uL 353    345   Neutrophils % Latest Units: %     86.8   Lymphocyte % Latest Units: %     5.1   Monocytes % Latest Units: %     7.5             Assessment:  Principal Problem:    Traumatic perinephric hematoma, left, initial encounter  Active Problems:    CAD (coronary artery disease)    Type 2 diabetes mellitus with diabetic polyneuropathy, with long-term current use of insulin (HCC)    Essential hypertension    CLINT (obstructive sleep apnea)    Tobacco abuse disorder    PAD (peripheral artery disease) (HCC)    GERD (gastroesophageal reflux disease)    Hyponatremia    Stage 3 chronic kidney disease (HCC)    Hypokalemia    COPD (chronic obstructive pulmonary disease) (HCC)    Cardiomyopathy (HCC)    Chronic systolic heart failure (HCC)    Left renal artery stenosis (HCC)    Aortocoronary bypass status    Acute kidney injury superimposed on CKD (HCC)    Leukocytosis    Acute blood loss anemia    DM (diabetes mellitus), secondary uncontrolled ([facility] [address]    Morbid obesity with BMI of 40.0-44.9, adult ([facility] [address]    Multiple wounds of skin  Resolved Problems:    * No resolved hospital problems.  *          Plan:   · O2 supplementation,if required, to keep SaO2 between 90-94% ONLY-patient was on RA when seen  · Monitor for hypoventilation  · S/p 2 units of PRBCs  · Monitor H/H and hemodynamics closely-minimally worse as compared to last night   · Patient underwent  coil embolization  · On empiric IV Rocephin and titration as per clinical status and c/s-WBC count is minimally better as compared to yesterday   · Monitor H/H   · Management of hyponatremia as per nephrology   · Monitor I/o and BMP-renal function minimally better   · Correct electrolytes on PRN basis   · Plavix restarted by vascular surgery   · Lantus insulin as per BGM   · BGM with SSI   · Judicious use of analgesics   · Bronchodilators  · Monitor for any fluid overload   · Incentive spirometry   · ?need for B-blockers once hemodynamics has improved  · Recent TSH was normal   · Wound care as per wound care team  · Patient admits being non compliant which is adding to the complexity   · PUD prophylaxis      Case d/w ICU team     Can be transferred out of ICU from Pulmonary/CCM stand point           Electronically signed by:  Baldomero Villafana MD    8/6/2020    11:24 AM.

## 2020-08-06 NOTE — PROGRESS NOTES
Progress Note          SUBJECTIVE:  We are following this patient for SHAUNNA; renovascular HTN. .  The patient had had elective renal arteriogram on 8/4 and was found to have significant L renal artery stenosis and she underwent successful stenting and angioplasty of the L renal artery. However, she returned several hours later due to acute onset of L flank pain. CT showed large subcapsular and perinephric hematoma. She had angiography with placement of coil in L renal artery subsegmental vessel on 8/5. Creat 1.2 on admit; 1.5 post-procedure. Interval hx: BP satisfactory. Hgb 8.3 (was 9.3 post-embolization). Creat 1.5 (vs 1.2 8/5 am). UOP 1210 ml. Soc hx: No visitors. ROS: Notes L flank pain; o/w no c/o. Physical Exam:    BP: 120/66       Constitutional:  Obese, mid-aged female. She is mild distress due to pain. HEENT:  Eyes: Not icteric. Nose: O2 per NC. Respiratory: Clear. Cardiovascular/Edema:  Reg rhythm; trace edema. Gastrointestinal: Obese; BS present. Marked L flank tenderness. Neurologic: No gross focal neurologic findings. Skin:  Wounds LE dressed. DATA:    Lab Results   Component Value Date    CREATININE 1.5 (H) 08/06/2020    BUN 52 (H) 08/06/2020     (L) 08/06/2020    K 4.1 08/06/2020    CL 87 (L) 08/06/2020    CO2 29 08/06/2020     Lab Results   Component Value Date    WBC 15.2 (H) 08/06/2020    HGB 8.3 (L) 08/06/2020    HCT 25.8 (L) 08/06/2020    MCV 78.0 (L) 08/06/2020     08/06/2020     IMPRESSION/RECOMMENDATIONS:    1. SHAUNNA. Not oliguric.  - Etiology: Pre-renal vs ATN (hypotension; contrast dye)   - Evaluate - Serial serum creat values; monitor UOP. - Creat stable vs post-procedure creat; not oliguric  - Plan: Maintain SBP; avoid nephrotoxic meds.     2. Perinephric hematoma. - Complication of L renal artery angioplasty / stent placement  - S/P coil embolization of subsegmental L renal artery     3.  Renovasc HTN.  - S/P L renal artery angioplasty / stent placement  - Follow for late recurrence of HTN (post-perinephric bleed)     4. Anemia - blood loss. - Hgb 8.3 - was 9.3 post-embolization  - S/P transfusion 2 units PRBC's  - Oral Fe added     5. Hyponatremia  - Na 129 (stable vs last pm); was 124 prior to procedure  - Monitor     6. Hypokalemia. - Likely due to diuretic effect and, on presentation to ER, catecholamine effect  - Received KCl supplement  - K nl     7.  DM2.  - Glucose 178 to 217.  - On Lantus and sliding scale insulin

## 2020-08-06 NOTE — PROGRESS NOTES
Vascular Surgery Progress Note      SUBJECTIVE:  Less flank pain today. No further hypotension    OBJECTIVE    Physical  CURRENT VITALS:  /66   Pulse 102   Temp 97.4 °F (36.3 °C) (Oral)   Resp 21   Ht 5' 4.5\" (1.638 m)   Wt 245 lb (111.1 kg)   LMP 10/24/2012   SpO2 98%   BMI 41.40 kg/m²   24 HR INTAKE/OUTPUT:    Intake/Output Summary (Last 24 hours) at 8/6/2020 0723  Last data filed at 8/6/2020 5918  Gross per 24 hour   Intake 3276 ml   Output 1210 ml   Net 2066 ml     Groin access site soft without hematoma    Data  CBC:   Lab Results   Component Value Date    WBC 15.2 08/06/2020    RBC 3.31 08/06/2020    HGB 8.3 08/06/2020    HCT 25.8 08/06/2020    MCV 78.0 08/06/2020    MCH 25.0 08/06/2020    MCHC 32.0 08/06/2020    RDW 20.2 08/06/2020     08/06/2020    MPV 8.4 08/06/2020     BMP:    Lab Results   Component Value Date     08/06/2020    K 4.1 08/06/2020    K 3.5 08/05/2020    CL 87 08/06/2020    CO2 29 08/06/2020    BUN 52 08/06/2020    LABALBU 3.5 08/06/2020    CREATININE 1.5 08/06/2020    CALCIUM 9.1 08/06/2020    GFRAA 43 08/06/2020    LABGLOM 36 08/06/2020    GLUCOSE 178 08/06/2020         ASSESSMENT AND PLAN    S/P Renal stent complicated by perinephric hematoma, now 1 day S/P coil embolization bleeding distal renal vessel. Hgb relatively stable. Pain decreased. Hemodynamically stable. Ok to increased activity. Iron replacement  Transfer out of ICU per Med- would send to C4/Tele.

## 2020-08-06 NOTE — PROGRESS NOTES
Hospitalist Progress Note      PCP: Clifton Oivedo PA-C    Date of Admission: 8/4/2020    Chief Complaint: left flank pain     Hospital Course:    62 y.o. female. She underwent bilateral renal angiography on 8/4/2020 by Dr. Tmomy Law. The left renal artery was stenotic. Angioplasty and stenting was performed. Patient was observed for ~3.5hrs post procedure and did well. She went home and developed severe left flank pain and so returns to the ER. Imaging showed left perinephric and retroperitoneal hematoma. Vascular surgery was consulted and admitted for close monitoring in ICU         Subjective:     S/p surgery yesterday. Reports left flank pain improved. Had some nausea today with no vomiting.        Medications:  Reviewed    Infusion Medications    dextrose       Scheduled Medications    ferrous sulfate  325 mg Oral BID WC    mupirocin   Nasal BID    zinc oxide   Topical Daily    cefTRIAXone (ROCEPHIN) IV  1 g Intravenous Q24H    aspirin EC  81 mg Oral Daily    atorvastatin  80 mg Oral Nightly    ARIPiprazole  10 mg Oral Daily    benztropine  1 mg Oral Nightly    busPIRone  30 mg Oral BID    clopidogrel  75 mg Oral Daily    lamoTRIgine  200 mg Oral BID    midodrine  7.5 mg Oral TID    pantoprazole  40 mg Oral BID    tiotropium  2 puff Inhalation Daily    insulin glargine  24 Units Subcutaneous Nightly    insulin lispro  6 Units Subcutaneous TID WC    insulin lispro  0-12 Units Subcutaneous Q4H     PRN Meds: ondansetron, oxyCODONE **OR** oxyCODONE, HYDROmorphone, acetaminophen, diphenhydrAMINE, diphenhydrAMINE, traZODone, sodium chloride flush, magnesium sulfate, glucose, dextrose, glucagon (rDNA), dextrose, potassium chloride **OR** potassium alternative oral replacement **OR** potassium chloride      Intake/Output Summary (Last 24 hours) at 8/6/2020 1352  Last data filed at 8/6/2020 0624  Gross per 24 hour   Intake 740 ml   Output 1210 ml   Net -470 ml       Physical Exam SPECGRAV 1.010 08/04/2020    GLUCOSEU Negative 08/04/2020       Radiology:  CT ABDOMEN PELVIS W IV CONTRAST Additional Contrast? None   Final Result   1. Large left subcapsular and perinephric hematoma, with active bleeding. Hematoma measures at least 12 cm in AP dimension, 11.3 cm in transverse   dimension, and 15 cm in cephalocaudal dimension, compressing and displacing   the left kidney. The hemorrhage extends both superiorly and inferiorly,   within the left retroperitoneum   2. Critical results were called by Dr. Ruth Medina to Dr Clementina Skelton on 8/4/2020   at 18:22. Assessment/Plan:    Active Hospital Problems    Diagnosis    Traumatic perinephric hematoma, left, initial encounter [S37.972A]     Priority: High    CAD (coronary artery disease) [I25.10]     Priority: High    Acute kidney injury superimposed on CKD ([facility] [N17.9, N18.9]    Leukocytosis [D72.829]    Acute blood loss anemia [D62]    DM (diabetes mellitus), secondary uncontrolled [facility] [E13.65]    Morbid obesity with BMI of 40.0-44.9, adult ([facility]) [E66.01, Z68.41]    Multiple wounds of skin [R23.8]    Aortocoronary bypass status [Z95.1]    Left renal artery stenosis ([facility]) [I70.1]    Cardiomyopathy ([facility] [I42.9]    Chronic systolic heart failure ([facility]) [I50.22]    COPD (chronic obstructive pulmonary disease) ([facility]) [J44.9]    Hypokalemia [E87.6]    Stage 3 chronic kidney disease ([facility]) [N18.3]    Hyponatremia [E87.1]    GERD (gastroesophageal reflux disease) [K21.9]    PAD (peripheral artery disease) ([facility]) [I73.9]    Type 2 diabetes mellitus with diabetic polyneuropathy, with long-term current use of insulin ([facility]) [E11.42, Z79.4]    CLINT (obstructive sleep apnea) [G47.33]    Tobacco abuse disorder [Z72.0]    Essential hypertension [I10]     Perinephric hematoma : noted after renal angio on 8/4/2020. Vascular assisting. Now s/p coil embolization of left subsegmental renal artery and CVC placement.  Held aspirin and

## 2020-08-06 NOTE — PROGRESS NOTES
Received bedside report from off going RN. Pts skin was assessed. Turned and repositioned. Lees in place. Orders verified. Pt A&O able to follow commands. Call light in reach, bed in lowest position, will continue to monitor.

## 2020-08-07 LAB
A/G RATIO: 1.1 (ref 1.1–2.2)
ALBUMIN SERPL-MCNC: 3.7 G/DL (ref 3.4–5)
ALP BLD-CCNC: 152 U/L (ref 40–129)
ALT SERPL-CCNC: 8 U/L (ref 10–40)
ANION GAP SERPL CALCULATED.3IONS-SCNC: 11 MMOL/L (ref 3–16)
AST SERPL-CCNC: 13 U/L (ref 15–37)
BASOPHILS ABSOLUTE: 0 K/UL (ref 0–0.2)
BASOPHILS RELATIVE PERCENT: 0.3 %
BILIRUB SERPL-MCNC: 1.3 MG/DL (ref 0–1)
BUN BLDV-MCNC: 48 MG/DL (ref 7–20)
CALCIUM SERPL-MCNC: 9.4 MG/DL (ref 8.3–10.6)
CHLORIDE BLD-SCNC: 82 MMOL/L (ref 99–110)
CO2: 30 MMOL/L (ref 21–32)
CREAT SERPL-MCNC: 1.4 MG/DL (ref 0.6–1.1)
EOSINOPHILS ABSOLUTE: 0.1 K/UL (ref 0–0.6)
EOSINOPHILS RELATIVE PERCENT: 0.6 %
GFR AFRICAN AMERICAN: 47
GFR NON-AFRICAN AMERICAN: 39
GLOBULIN: 3.4 G/DL
GLUCOSE BLD-MCNC: 132 MG/DL (ref 70–99)
GLUCOSE BLD-MCNC: 168 MG/DL (ref 70–99)
GLUCOSE BLD-MCNC: 174 MG/DL (ref 70–99)
GLUCOSE BLD-MCNC: 186 MG/DL (ref 70–99)
GLUCOSE BLD-MCNC: 190 MG/DL (ref 70–99)
HCT VFR BLD CALC: 24.8 % (ref 36–48)
HEMOGLOBIN: 7.9 G/DL (ref 12–16)
INR BLD: 1.22 (ref 0.86–1.14)
LYMPHOCYTES ABSOLUTE: 0.7 K/UL (ref 1–5.1)
LYMPHOCYTES RELATIVE PERCENT: 5.5 %
MCH RBC QN AUTO: 24.9 PG (ref 26–34)
MCHC RBC AUTO-ENTMCNC: 31.9 G/DL (ref 31–36)
MCV RBC AUTO: 77.8 FL (ref 80–100)
MONOCYTES ABSOLUTE: 1.1 K/UL (ref 0–1.3)
MONOCYTES RELATIVE PERCENT: 8 %
NEUTROPHILS ABSOLUTE: 11.3 K/UL (ref 1.7–7.7)
NEUTROPHILS RELATIVE PERCENT: 85.6 %
PDW BLD-RTO: 20.7 % (ref 12.4–15.4)
PERFORMED ON: ABNORMAL
PLATELET # BLD: 349 K/UL (ref 135–450)
PMV BLD AUTO: 8.3 FL (ref 5–10.5)
POTASSIUM SERPL-SCNC: 3.9 MMOL/L (ref 3.5–5.1)
PROTHROMBIN TIME: 14.2 SEC (ref 10–13.2)
RBC # BLD: 3.19 M/UL (ref 4–5.2)
SODIUM BLD-SCNC: 123 MMOL/L (ref 136–145)
TOTAL PROTEIN: 7.1 G/DL (ref 6.4–8.2)
WBC # BLD: 13.3 K/UL (ref 4–11)

## 2020-08-07 PROCEDURE — 94640 AIRWAY INHALATION TREATMENT: CPT

## 2020-08-07 PROCEDURE — 6370000000 HC RX 637 (ALT 250 FOR IP): Performed by: INTERNAL MEDICINE

## 2020-08-07 PROCEDURE — 2580000003 HC RX 258: Performed by: INTERNAL MEDICINE

## 2020-08-07 PROCEDURE — 97535 SELF CARE MNGMENT TRAINING: CPT

## 2020-08-07 PROCEDURE — 97166 OT EVAL MOD COMPLEX 45 MIN: CPT

## 2020-08-07 PROCEDURE — 2060000000 HC ICU INTERMEDIATE R&B

## 2020-08-07 PROCEDURE — 6370000000 HC RX 637 (ALT 250 FOR IP): Performed by: SURGERY

## 2020-08-07 PROCEDURE — 85025 COMPLETE CBC W/AUTO DIFF WBC: CPT

## 2020-08-07 PROCEDURE — 85610 PROTHROMBIN TIME: CPT

## 2020-08-07 PROCEDURE — 97116 GAIT TRAINING THERAPY: CPT

## 2020-08-07 PROCEDURE — 6360000002 HC RX W HCPCS: Performed by: INTERNAL MEDICINE

## 2020-08-07 PROCEDURE — 97162 PT EVAL MOD COMPLEX 30 MIN: CPT

## 2020-08-07 PROCEDURE — 80053 COMPREHEN METABOLIC PANEL: CPT

## 2020-08-07 RX ORDER — TOLVAPTAN 15 MG/1
7.5 TABLET ORAL ONCE
Status: COMPLETED | OUTPATIENT
Start: 2020-08-07 | End: 2020-08-07

## 2020-08-07 RX ADMIN — OXYCODONE HYDROCHLORIDE 10 MG: 5 TABLET ORAL at 09:00

## 2020-08-07 RX ADMIN — FERROUS SULFATE TAB 325 MG (65 MG ELEMENTAL FE) 325 MG: 325 (65 FE) TAB at 17:33

## 2020-08-07 RX ADMIN — PANTOPRAZOLE SODIUM 40 MG: 40 TABLET, DELAYED RELEASE ORAL at 08:57

## 2020-08-07 RX ADMIN — OXYCODONE HYDROCHLORIDE 10 MG: 5 TABLET ORAL at 22:32

## 2020-08-07 RX ADMIN — BUSPIRONE HYDROCHLORIDE 30 MG: 5 TABLET ORAL at 20:50

## 2020-08-07 RX ADMIN — PANTOPRAZOLE SODIUM 40 MG: 40 TABLET, DELAYED RELEASE ORAL at 17:19

## 2020-08-07 RX ADMIN — FERROUS SULFATE TAB 325 MG (65 MG ELEMENTAL FE) 325 MG: 325 (65 FE) TAB at 12:24

## 2020-08-07 RX ADMIN — ATORVASTATIN CALCIUM 80 MG: 80 TABLET, FILM COATED ORAL at 20:50

## 2020-08-07 RX ADMIN — TIOTROPIUM BROMIDE INHALATION SPRAY 2 PUFF: 3.12 SPRAY, METERED RESPIRATORY (INHALATION) at 07:42

## 2020-08-07 RX ADMIN — ARIPIPRAZOLE 10 MG: 10 TABLET ORAL at 09:11

## 2020-08-07 RX ADMIN — Medication 10 ML: at 20:50

## 2020-08-07 RX ADMIN — CLOPIDOGREL BISULFATE 75 MG: 75 TABLET ORAL at 08:56

## 2020-08-07 RX ADMIN — LAMOTRIGINE 200 MG: 100 TABLET ORAL at 20:50

## 2020-08-07 RX ADMIN — INSULIN LISPRO 6 UNITS: 100 INJECTION, SOLUTION INTRAVENOUS; SUBCUTANEOUS at 09:00

## 2020-08-07 RX ADMIN — OXYCODONE HYDROCHLORIDE 10 MG: 5 TABLET ORAL at 18:13

## 2020-08-07 RX ADMIN — INSULIN LISPRO 2 UNITS: 100 INJECTION, SOLUTION INTRAVENOUS; SUBCUTANEOUS at 18:14

## 2020-08-07 RX ADMIN — BUSPIRONE HYDROCHLORIDE 30 MG: 5 TABLET ORAL at 12:24

## 2020-08-07 RX ADMIN — OXYCODONE HYDROCHLORIDE 10 MG: 5 TABLET ORAL at 04:50

## 2020-08-07 RX ADMIN — INSULIN LISPRO 6 UNITS: 100 INJECTION, SOLUTION INTRAVENOUS; SUBCUTANEOUS at 18:15

## 2020-08-07 RX ADMIN — Medication: at 12:26

## 2020-08-07 RX ADMIN — LAMOTRIGINE 200 MG: 100 TABLET ORAL at 08:56

## 2020-08-07 RX ADMIN — OXYCODONE HYDROCHLORIDE 10 MG: 5 TABLET ORAL at 13:16

## 2020-08-07 RX ADMIN — MIDODRINE HYDROCHLORIDE 7.5 MG: 5 TABLET ORAL at 18:27

## 2020-08-07 RX ADMIN — MIDODRINE HYDROCHLORIDE 7.5 MG: 5 TABLET ORAL at 14:11

## 2020-08-07 RX ADMIN — CEFTRIAXONE SODIUM 1 G: 1 INJECTION, POWDER, FOR SOLUTION INTRAMUSCULAR; INTRAVENOUS at 20:50

## 2020-08-07 RX ADMIN — ONDANSETRON 4 MG: 2 INJECTION INTRAMUSCULAR; INTRAVENOUS at 18:27

## 2020-08-07 RX ADMIN — INSULIN LISPRO 2 UNITS: 100 INJECTION, SOLUTION INTRAVENOUS; SUBCUTANEOUS at 12:15

## 2020-08-07 RX ADMIN — BENZTROPINE MESYLATE 1 MG: 1 TABLET ORAL at 20:50

## 2020-08-07 RX ADMIN — INSULIN LISPRO 6 UNITS: 100 INJECTION, SOLUTION INTRAVENOUS; SUBCUTANEOUS at 12:17

## 2020-08-07 RX ADMIN — Medication 10 ML: at 08:57

## 2020-08-07 RX ADMIN — MUPIROCIN: 20 OINTMENT TOPICAL at 09:00

## 2020-08-07 RX ADMIN — INSULIN GLARGINE 24 UNITS: 100 INJECTION, SOLUTION SUBCUTANEOUS at 22:20

## 2020-08-07 RX ADMIN — INSULIN LISPRO 2 UNITS: 100 INJECTION, SOLUTION INTRAVENOUS; SUBCUTANEOUS at 08:56

## 2020-08-07 RX ADMIN — ASPIRIN 81 MG: 81 TABLET ORAL at 08:56

## 2020-08-07 RX ADMIN — MIDODRINE HYDROCHLORIDE 7.5 MG: 5 TABLET ORAL at 06:41

## 2020-08-07 RX ADMIN — TOLVAPTAN 7.5 MG: 15 TABLET ORAL at 17:18

## 2020-08-07 ASSESSMENT — PAIN SCALES - GENERAL
PAINLEVEL_OUTOF10: 8
PAINLEVEL_OUTOF10: 9
PAINLEVEL_OUTOF10: 9
PAINLEVEL_OUTOF10: 7
PAINLEVEL_OUTOF10: 9
PAINLEVEL_OUTOF10: 8

## 2020-08-07 ASSESSMENT — PAIN DESCRIPTION - LOCATION: LOCATION: BACK

## 2020-08-07 ASSESSMENT — PAIN DESCRIPTION - ORIENTATION: ORIENTATION: LEFT

## 2020-08-07 ASSESSMENT — PAIN DESCRIPTION - PAIN TYPE: TYPE: ACUTE PAIN;CHRONIC PAIN

## 2020-08-07 NOTE — PROGRESS NOTES
Vascular Surgery Progress Note      SUBJECTIVE:  Still with some flank pain but improved    OBJECTIVE    Physical  CURRENT VITALS:  /70   Pulse 98   Temp 98.2 °F (36.8 °C) (Oral)   Resp 11   Ht 5' 4.5\" (1.638 m)   Wt 250 lb 7.1 oz (113.6 kg)   LMP 10/24/2012   SpO2 96%   BMI 42.32 kg/m²   24 HR INTAKE/OUTPUT:    Intake/Output Summary (Last 24 hours) at 8/7/2020 0802  Last data filed at 8/7/2020 0752  Gross per 24 hour   Intake 1191 ml   Output 1925 ml   Net -734 ml     PE unchanged    Data  CBC:   Lab Results   Component Value Date    WBC 13.3 08/07/2020    RBC 3.19 08/07/2020    HGB 7.9 08/07/2020    HCT 24.8 08/07/2020    MCV 77.8 08/07/2020    MCH 24.9 08/07/2020    MCHC 31.9 08/07/2020    RDW 20.7 08/07/2020     08/07/2020    MPV 8.3 08/07/2020     BMP:    Lab Results   Component Value Date     08/07/2020    K 3.9 08/07/2020    K 3.5 08/05/2020    CL 82 08/07/2020    CO2 30 08/07/2020    BUN 48 08/07/2020    LABALBU 3.7 08/07/2020    CREATININE 1.4 08/07/2020    CALCIUM 9.4 08/07/2020    GFRAA 47 08/07/2020    LABGLOM 39 08/07/2020    GLUCOSE 174 08/07/2020         ASSESSMENT AND PLAN    Perinephric hematoma secondary to subcapsular bleed as a complication of renal stenting. S/P Coil embolization. Hgb slightly decreased- likely equilibration, doubt ongoing bleed as no further hypotension. Ok to ambulate- (IJ Central venous catheter should not preclude this)  Would observe one more day for continued stability before considering discharge.

## 2020-08-07 NOTE — PROGRESS NOTES
Physical Therapy    Facility/Department: [facility] B2 - ICU  Initial Assessment and treatment    NAME: Debbie De Souza  : 1965  MRN: [id number]    Date of Service: 2020    Discharge Recommendations:  24 hour supervision or assist   PT Equipment Recommendations  Equipment Needed: (CTA for need for walker)    Assessment   Body structures, Functions, Activity limitations: Decreased endurance  Assessment: Pt referred forPT evaluation during current hospital stay with a diagnosis of periphrenic hematoma. Pt currently functioning somewhat below baseline, requiring SBA to CGA for gait without AD, with decreased endurance for gait and moderate unsteadiness at times without AD. Pt would benefit from 1-2 additional sessions of acute PT to address deficts, specifically with gait. Recommend home with 24 hr sup/assist at [facility] setting  Treatment Diagnosis: decreased endurance and balance  Prognosis: Good  Decision Making: Medium Complexity  PT Education: Goals;Gait Training;PT Role;Disease Specific Education;Plan of Care;Home Exercise Program  Patient Education: Pt verbalized understanding; diseasse specific ed included energy conservation with mobility  Barriers to Learning: no  REQUIRES PT FOLLOW UP: Yes  Activity Tolerance  Activity Tolerance: Patient limited by endurance; Patient Tolerated treatment well  Activity Tolerance: Pt with SOB during and after amb, vital signs stable throughout       Patient Diagnosis(es): The primary encounter diagnosis was Traumatic perinephric hematoma, left, initial encounter. Diagnoses of Hypokalemia, Stage III chronic kidney disease ([address] Left renal artery stenosis ([address] and Hyponatremia were also pertinent to this visit.      has a past medical history of Acute kidney injury ([address] Acute on chronic congestive heart failure ([address] Alcohol dependence ([address] Bipolar 1 disorder ([address] CAD (coronary artery disease), Cardiomyopathy ([address] Cellulitis, CHF (congestive heart failure) ([facility] COPD (chronic obstructive pulmonary disease) ([facility] Diabetic ulcer of left foot associated with type 2 diabetes mellitus ([facility] Diabetic ulcer of toe of right foot associated with type 2 diabetes mellitus ([facility] GERD (gastroesophageal reflux disease), High cholesterol, HTN (hypertension), Kidney disease, chronic, stage II (mild, EGFR 60+ ml/min), MI (myocardial infarction) ([facility] Positive FIT (fecal immunochemical test), Pulmonary nodule, and PVD (peripheral vascular disease) ([facility] has a past surgical history that includes Tonsillectomy; Cholecystectomy; tumor excision; Upper gastrointestinal endoscopy (4/25/2013); Upper gastrointestinal endoscopy (12/10/2015); Upper gastrointestinal endoscopy (01/06/2017); Arterial bypass surgry (Left, 01/06/2016); Cardiac catheterization (03/27/2018); Coronary angioplasty with stent (03/16/2018); Rotator cuff repair (Left, 04/22/2016); Coronary angioplasty with stent (01/03/2018); Insertable Cardiac Monitor (02/14/2019); femoral bypass (Right, 5/16/2019); transluminal angioplasty (Left, 10/08/2019); Cardiac catheterization (01/20/2020); Coronary artery bypass graft (N/A, 1/27/2020); vascular surgery (Left, 12/08/2015); transluminal angioplasty (Left, 04/29/2020); vascular surgery (Bilateral, 07/07/2020); Coronary angioplasty with stent (10/07/2019); and transesophageal echocardiogram (01/26/2020).     Restrictions  Restrictions/Precautions  Restrictions/Precautions: Up as Tolerated, General Precautions  Position Activity Restriction  Other position/activity restrictions: activity as tolerated  Vision/Hearing  Vision: Impaired  Vision Exceptions: Wears glasses for reading  Hearing: Within functional limits     Subjective  General  Chart Reviewed: Yes  Patient assessed for rehabilitation services?: Yes  Response To Previous Treatment: Not applicable  Family / Caregiver Present: No  Referring Practitioner: MD Shahriar  Referral Date : 08/07/20  Diagnosis: assistance(EOB, to [facility] to bed without AD)  Ambulation  Ambulation?: Yes  Ambulation 1  Surface: level tile  Device: No Device  Assistance: Stand by assistance;Contact guard assistance  Quality of Gait: wide GABINO with postural sway, mild to moderate unsteadiness, one episode of mild LOB, CGA  Gait Deviations: Increased GABINO; Slow Melissa  Distance: 75 ft  Comments: pt with SOB during and after amb on RA, vitals stable     Balance  Posture: Fair  Sitting - Static: Good;-  Sitting - Dynamic: Good;-  Standing - Static: Fair;+  Standing - Dynamic: Fair  Exercises  Gluteal Sets: x 10 B  Hip Flexion: x 10 B  Hip Abduction: x 10 B clamshells  Knee Long Arc Quad: x 10 B  Ankle Pumps: x 15 B     Plan   Plan  Times per week: 1-2 additional sessions  Current Treatment Recommendations: Strengthening, Home Exercise Program, Balance Training, Endurance Training, Gait Training  Safety Devices  Type of devices: All fall risk precautions in place, Call light within reach, Nurse notified, Gait belt, Left in bed(left seated EOB, alarm not needed per RN)                                                             AM-PAC Score  AM-PAC Inpatient Mobility Raw Score : 20 (08/07/20 1256)  AM-PAC Inpatient T-Scale Score : 47.67 (08/07/20 1256)  Mobility Inpatient CMS 0-100% Score: 35.83 (08/07/20 1256)  Mobility Inpatient CMS G-Code Modifier : Melissa Slice (08/07/20 1256)          Goals  Short term goals  Time Frame for Short term goals: 8/12/20 unless noted  Short term goal 1: Pt will perform transfers independently by 8/11/20  Short term goal 2: Pt will ambulate 150 ft with LRAD and supervision  Patient Goals   Patient goals : \"to go home\"       Therapy Time   Individual Concurrent Group Co-treatment   Time In 0942         Time Out 1006         Minutes 24         Timed Code Treatment Minutes: 14 Minutes    If pt is discharged prior to next therapy session, this note will serve as discharge summary.     Jessica Sheikh, PT

## 2020-08-07 NOTE — PROGRESS NOTES
Progress Note          SUBJECTIVE:    We are following this patient for SHAUNNA; renovascular HTN. The patient had had elective renal arteriogram on 8/4 and was found to have significant L renal artery stenosis and she underwent successful stenting and angioplasty of the L renal artery. However, she returned several hours later due to acute onset of L flank pain. CT showed large subcapsular and perinephric hematoma. She had angiography with placement of coil in L renal artery subsegmental vessel on 8/5. Creat 1.2 on admit; 1.5 post-procedure. The patient was seen and examined; she feels well today with no CP, SOB, nausea or vomiting. ROS: No fever or chills. Social: No family at bedside. Physical Exam:    Weight: 250 lb 7.1 oz (113.6 kg), BP: 118/70       Constitutional:  Obese, mid-aged female. She is mild distress due to pain. Respiratory: Clear. Cardiovascular/Edema:  Reg rhythm; trace edema. Gastrointestinal: Obese; BS present. Marked L flank tenderness. Neurologic: No gross focal neurologic findings. Skin:  Wounds LE dressed. DATA:    Lab Results   Component Value Date    CREATININE 1.4 (H) 08/07/2020    BUN 48 (H) 08/07/2020     (L) 08/07/2020    K 3.9 08/07/2020    CL 82 (L) 08/07/2020    CO2 30 08/07/2020     Lab Results   Component Value Date    WBC 13.3 (H) 08/07/2020    HGB 7.9 (L) 08/07/2020    HCT 24.8 (L) 08/07/2020    MCV 77.8 (L) 08/07/2020     08/07/2020     IMPRESSION/RECOMMENDATIONS:    1. SHAUNNA. Not oliguric.  - Etiology: Pre-renal vs ATN (hypotension; contrast dye)   - Evaluate - Serial serum creat values; monitor UOP. - Creat stable vs post-procedure creat; not oliguric  - Plan: Maintain SBP; avoid nephrotoxic meds.     2. Perinephric hematoma. - Complication of L renal artery angioplasty / stent placement  - S/P coil embolization of subsegmental L renal artery     3.  Renovasc HTN.  - S/P L renal artery angioplasty / stent placement  - Follow for late recurrence of HTN (post-perinephric bleed)     4. Anemia - blood loss. - Hgb 7.9 - was 9.3 post-embolization  - S/P transfusion 2 units PRBC's  - Oral Fe added     5. Hyponatremia  - Na 123   - Will administer a single dose of Tolvaptan      6.  Hypokalemia.  - Received KCl supplement  - K nl

## 2020-08-07 NOTE — PROGRESS NOTES
Hospitalist Progress Note      PCP: Marjorie Bush PA-C    Date of Admission: 8/4/2020    Chief Complaint: left flank pain     Hospital Course:    62 y.o. female. She underwent bilateral renal angiography on 8/4/2020 by Dr. Mercedez Burch. The left renal artery was stenotic. Angioplasty and stenting was performed. Patient was observed for ~3.5hrs post procedure and did well. She went home and developed severe left flank pain and so returns to the ER. Imaging showed left perinephric and retroperitoneal hematoma. Vascular surgery was consulted and admitted for close monitoring in ICU         Subjective:     Patient doing better today. Sitting up in bed and reports left flank pain improved.        Medications:  Reviewed    Infusion Medications    dextrose       Scheduled Medications    tolvaptan  7.5 mg Oral Once    ferrous sulfate  325 mg Oral BID WC    mupirocin   Nasal BID    zinc oxide   Topical Daily    cefTRIAXone (ROCEPHIN) IV  1 g Intravenous Q24H    aspirin EC  81 mg Oral Daily    atorvastatin  80 mg Oral Nightly    ARIPiprazole  10 mg Oral Daily    benztropine  1 mg Oral Nightly    busPIRone  30 mg Oral BID    clopidogrel  75 mg Oral Daily    lamoTRIgine  200 mg Oral BID    midodrine  7.5 mg Oral TID    pantoprazole  40 mg Oral BID    tiotropium  2 puff Inhalation Daily    insulin glargine  24 Units Subcutaneous Nightly    insulin lispro  6 Units Subcutaneous TID WC    insulin lispro  0-12 Units Subcutaneous Q4H     PRN Meds: ondansetron, oxyCODONE **OR** oxyCODONE, HYDROmorphone, acetaminophen, diphenhydrAMINE, diphenhydrAMINE, traZODone, sodium chloride flush, magnesium sulfate, glucose, dextrose, glucagon (rDNA), dextrose, potassium chloride **OR** potassium alternative oral replacement **OR** potassium chloride      Intake/Output Summary (Last 24 hours) at 8/7/2020 1406  Last data filed at 8/7/2020 0752  Gross per 24 hour   Intake 1191 ml   Output 1925 ml   Net -734 ml       Physical 09/28/2019    RBCUA 3-4 08/04/2020    BLOODU TRACE-INTACT 08/04/2020    SPECGRAV 1.010 08/04/2020    GLUCOSEU Negative 08/04/2020       Radiology:  CT ABDOMEN PELVIS W IV CONTRAST Additional Contrast? None   Final Result   1. Large left subcapsular and perinephric hematoma, with active bleeding. Hematoma measures at least 12 cm in AP dimension, 11.3 cm in transverse   dimension, and 15 cm in cephalocaudal dimension, compressing and displacing   the left kidney. The hemorrhage extends both superiorly and inferiorly,   within the left retroperitoneum   2. Critical results were called by Dr. Meli Soler to Dr Juan Luis Castro on 8/4/2020   at 18:22. Assessment/Plan:    Active Hospital Problems    Diagnosis    Traumatic perinephric hematoma, left, initial encounter [S37.092A]     Priority: High    CAD (coronary artery disease) [I25.10]     Priority: High    Acute kidney injury superimposed on CKD ([facility] [N17.9, N18.9]    Leukocytosis [D72.829]    Acute blood loss anemia [D62]    DM (diabetes mellitus), secondary uncontrolled ([facility] [E13.65]    Morbid obesity with BMI of 40.0-44.9, adult ([facility]) [E66.01, Z68.41]    Multiple wounds of skin [R23.8]    Aortocoronary bypass status [Z95.1]    Left renal artery stenosis ([facility]) [I70.1]    Cardiomyopathy ([facility] [I42.9]    Chronic systolic heart failure ([facility]) [I50.22]    COPD (chronic obstructive pulmonary disease) ([facility]) [J44.9]    Hypokalemia [E87.6]    Stage 3 chronic kidney disease ([facility]) [N18.3]    Hyponatremia [E87.1]    GERD (gastroesophageal reflux disease) [K21.9]    PAD (peripheral artery disease) ([facility]) [I73.9]    Type 2 diabetes mellitus with diabetic polyneuropathy, with long-term current use of insulin ([facility]) [E11.42, Z79.4]    CLINT (obstructive sleep apnea) [G47.33]    Tobacco abuse disorder [Z72.0]    Essential hypertension [I10]     Perinephric hematoma : noted after renal angio on 8/4/2020. Vascular assisting.  Now s/p coil embolization of

## 2020-08-07 NOTE — PROGRESS NOTES
Occupational Therapy   Occupational Therapy Initial Assessment and Treatment Note 1x  Date: 2020   Patient Name: Patti Longo  MRN: [id number]     : 1965    Date of Service: 2020    Discharge Recommendations:  Home with assist PRN     Assessment   Assessment: OT eval completed. Pt able to perform toilet transfers and toileting at Ind level. Standing balance was intact for ADLs. Pt instructed on UE exercises. No further OT. Decision Making: Medium Complexity  OT Education: OT Role;IADL Safety;Home Exercise Program  Patient Education: disease specific ed:  UE HEP, energy conservation for BADLs/IADLs. Pt receptive to education  REQUIRES OT FOLLOW UP: No  Activity Tolerance  Activity Tolerance: Patient Tolerated treatment well  Activity Tolerance: /70,   Safety Devices  Safety Devices in place: Yes  Type of devices: Nurse notified;Gait belt;Call light within reach; Left in bed       Patient Diagnosis(es): The primary encounter diagnosis was Traumatic perinephric hematoma, left, initial encounter. Diagnoses of Hypokalemia, Stage III chronic kidney disease ([address] Left renal artery stenosis ([address] and Hyponatremia were also pertinent to this visit. has a past medical history of Acute kidney injury ([address] Acute on chronic congestive heart failure ([address] Alcohol dependence ([address] Bipolar 1 disorder ([address] CAD (coronary artery disease), Cardiomyopathy ([address] Cellulitis, CHF (congestive heart failure) ([address] COPD (chronic obstructive pulmonary disease) ([address] Diabetic ulcer of left foot associated with type 2 diabetes mellitus ([address] Diabetic ulcer of toe of right foot associated with type 2 diabetes mellitus ([address] GERD (gastroesophageal reflux disease), High cholesterol, HTN (hypertension), Kidney disease, chronic, stage II (mild, EGFR 60+ ml/min), MI (myocardial infarction) ([address] Positive FIT (fecal immunochemical test), Pulmonary nodule, and PVD (peripheral vascular disease) ([address] has a past surgical history that includes Tonsillectomy; Cholecystectomy; tumor excision; Upper gastrointestinal endoscopy (4/25/2013); Upper gastrointestinal endoscopy (12/10/2015); Upper gastrointestinal endoscopy (01/06/2017); Arterial bypass surgry (Left, 01/06/2016); Cardiac catheterization (03/27/2018); Coronary angioplasty with stent (03/16/2018); Rotator cuff repair (Left, 04/22/2016); Coronary angioplasty with stent (01/03/2018); Insertable Cardiac Monitor (02/14/2019); femoral bypass (Right, 5/16/2019); transluminal angioplasty (Left, 10/08/2019); Cardiac catheterization (01/20/2020); Coronary artery bypass graft (N/A, 1/27/2020); vascular surgery (Left, 12/08/2015); transluminal angioplasty (Left, 04/29/2020); vascular surgery (Bilateral, 07/07/2020); Coronary angioplasty with stent (10/07/2019); and transesophageal echocardiogram (01/26/2020). Restrictions  Restrictions/Precautions  Restrictions/Precautions: Up as Tolerated, General Precautions  Position Activity Restriction  Other position/activity restrictions: activity as tolerated    Subjective   General  Chart Reviewed: Yes  Patient assessed for rehabilitation services?: Yes  Family / Caregiver Present: No  Referring Practitioner: N Anusionwu  Diagnosis: perinephric hematoma s/p coil embolization  Subjective  Subjective: Pt seated EOB at OT arrival.  General Comment  Comments: RN approved therapy  Patient Currently in Pain: Yes  Pain Assessment  Pain Assessment: 0-10  Pain Level: 9  Pain Type: Acute pain;Chronic pain  Pain Location: Back  Pain Orientation: Left  Non-Pharmaceutical Pain Intervention(s): Ambulation/Increased Activity;Repositioned; Therapeutic presence  Vital Signs  Patient Currently in Pain: Yes  Social/Functional History  Social/Functional History  Lives With: Friend(s)(and daughter)  Type of Home: Trailer  Home Layout: One level  Home Access: Stairs to enter with rails  Entrance Stairs - Number of Steps: 4  Entrance Stairs - Rails: Both  Bathroom Shower/Tub: Tub/Shower unit  Bathroom Toilet: Standard  Bathroom Equipment: Shower chair  ADL Assistance: Independent  Homemaking Assistance: Independent  Homemaking Responsibilities: Yes  Meal Prep Responsibility: Primary  Laundry Responsibility: Primary  Cleaning Responsibility: Primary  Shopping Responsibility: Primary  Ambulation Assistance: Independent  Transfer Assistance: Independent  Active : Yes  Occupation: On disability  Leisure & Hobbies: help people, drives friends to store and cleans for people     Objective   Vision: Impaired  Vision Exceptions: Wears glasses for reading  Hearing: Within functional limits    Orientation  Overall Orientation Status: Within Functional Limits     Balance  Sitting Balance: Independent  Standing Balance: Independent  Standing Balance  Time: x5 min  Functional Mobility  Functional - Mobility Device: No device  Activity: Other  Assist Level: Independent  Toilet Transfers  Toilet - Technique: Ambulating  Equipment Used: Standard toilet  Toilet Transfer: Independent  ADL  Feeding: Independent; Beverage management  Grooming: Independent(wash hands at sink)  LE Dressing: Stand by assistance; Increased time to complete(for socks, pt c/o LE edema)  Toileting: Independent  Tone RUE  RUE Tone: Normotonic  Tone LUE  LUE Tone: Normotonic  Coordination  Movements Are Fluid And Coordinated: Yes     Bed mobility  Sit to Supine: Independent  Transfers  Stand Pivot Transfers: Independent  Sit to stand: Independent  Stand to sit:  Independent     Cognition  Overall Cognitive Status: WFL      Type of ROM/Therapeutic Exercise  Type of ROM/Therapeutic Exercise: AROM  Comment: Pt educated on UE exercises  Exercises  Shoulder Elevation: 10x  Shoulder Flexion: 10x  Horizontal ABduction: 10x  Horizontal ADduction: 10x  Elbow Flexion: 10x  Elbow Extension: 10x  Supination: 10x  Pronation: 10x  Grasp/Release: 10x     LUE AROM (degrees)  LUE AROM : WFL  RUE AROM (degrees)  RUE AROM : [facility]     AM-PAC Score   AM-PAC Inpatient Daily Activity Raw Score: 24 (08/07/20 1111)  AM-PAC Inpatient ADL T-Scale Score : 57.54 (08/07/20 1111)  ADL Inpatient CMS 0-100% Score: 0 (08/07/20 1111)  ADL Inpatient CMS G-Code Modifier : CH (08/07/20 1111)  Goals  Short term goals  Time Frame for Short term goals: 1x  Short term goal 1: Perform basic ADL tasks at Ind level. Goal met  Short term goal 2: Perform functional mobility and transfers at Ind level.  Goal met     Therapy Time   Individual Concurrent Group Co-treatment   Time In 0943         Time Out 1006         Minutes 23         Timed Code Treatment Minutes: 13 Minutes(10 min eval)     Tammie Elam OT

## 2020-08-07 NOTE — PROGRESS NOTES
Patient transferred to [id number] from ICU . Patient oriented to room, call light, bed rails, phone, lights and bathroom. Patient instructed about the schedule of the day including: vital sign frequency, lab draws, possible tests, frequency of MD and staff rounds, including RN/MD rounding together at bedside, daily weights, and I &O's. Patient instructed about prescribed diet, how to use 8MENU, and television. Telemetry box in place, patient aware of placement and reason. Bed locked, in lowest position, side rails up 2/4, call light within reach. Will continue to monitor.

## 2020-08-07 NOTE — PROGRESS NOTES
Physician Progress Note      Lashonda Jurado  CSN #:                  [id number]  :                       1965  ADMIT DATE:       2020 4:16 PM  [address] [place] DATE:  RESPONDING  PROVIDER #:        Tiffany Valdez MD          QUERY TEXT:    Pt admitted with \"Perinephric hematoma : noted after renal angio\". Noted   documentation of \"S/P Renal angio complicated by perinephric hematoma. \" per Dr Erlin Cast in note from . If possible, please document in progress notes and   discharge summary the relationship, if any, between renal angio and hematoma. The medical record reflects the following:  Risk Factors: 62 y.o. female s/p left renal artery angioplasty and stenting  Clinical Indicators: returned to ER after procedure with pain, perinephric   hematoma; nephr notes: \"Perinephric hematoma. - Complication of L renal artery angioplasty / stent placement\" and vasc notes   as above  Treatment: emergent coil embolism of renal artery, vascular consult, nephr   consult, serial labs and monitoring, ongoing supportive care    Thank you,  Lizeth Zapata RN [facility]  [phone]  Options provided:  -- perinephric hematoma due to left renal artery angioplasty and stenting  -- perinephric hematoma unrelated to left renal angioplasty and stenting  -- Other - I will add my own diagnosis  -- Disagree - Not applicable / Not valid  -- Disagree - Clinically unable to determine / Unknown  -- Refer to Clinical Documentation Reviewer    PROVIDER RESPONSE TEXT:    This patient has perinephric hematoma due to left renal angioplasty and   stenting. Query created by: Aleida Holman on 2020 2:34 PM      QUERY TEXT:    Pt admitted with perinephric hematoma. Pt noted with hypotension, required IV   fluid and PRBC and pressure support with midodrine, sustained SHAUNNA. If   possible, please document in the progress notes and discharge summary if you   are evaluating and/or treating any of the following:     The

## 2020-08-08 LAB
A/G RATIO: 1.1 (ref 1.1–2.2)
ALBUMIN SERPL-MCNC: 3.8 G/DL (ref 3.4–5)
ALP BLD-CCNC: 168 U/L (ref 40–129)
ALT SERPL-CCNC: 8 U/L (ref 10–40)
ANION GAP SERPL CALCULATED.3IONS-SCNC: 13 MMOL/L (ref 3–16)
AST SERPL-CCNC: 14 U/L (ref 15–37)
BASOPHILS ABSOLUTE: 0.1 K/UL (ref 0–0.2)
BASOPHILS RELATIVE PERCENT: 0.9 %
BILIRUB SERPL-MCNC: 1.4 MG/DL (ref 0–1)
BUN BLDV-MCNC: 48 MG/DL (ref 7–20)
CALCIUM SERPL-MCNC: 9 MG/DL (ref 8.3–10.6)
CHLORIDE BLD-SCNC: 83 MMOL/L (ref 99–110)
CO2: 28 MMOL/L (ref 21–32)
CREAT SERPL-MCNC: 1.7 MG/DL (ref 0.6–1.1)
EOSINOPHILS ABSOLUTE: 0.1 K/UL (ref 0–0.6)
EOSINOPHILS RELATIVE PERCENT: 1.5 %
GFR AFRICAN AMERICAN: 38
GFR NON-AFRICAN AMERICAN: 31
GLOBULIN: 3.4 G/DL
GLUCOSE BLD-MCNC: 105 MG/DL (ref 70–99)
GLUCOSE BLD-MCNC: 123 MG/DL (ref 70–99)
GLUCOSE BLD-MCNC: 128 MG/DL (ref 70–99)
GLUCOSE BLD-MCNC: 165 MG/DL (ref 70–99)
GLUCOSE BLD-MCNC: 242 MG/DL (ref 70–99)
GLUCOSE BLD-MCNC: 56 MG/DL (ref 70–99)
GLUCOSE BLD-MCNC: 61 MG/DL (ref 70–99)
HCT VFR BLD CALC: 24.4 % (ref 36–48)
HEMOGLOBIN: 7.9 G/DL (ref 12–16)
INR BLD: 1.26 (ref 0.86–1.14)
LYMPHOCYTES ABSOLUTE: 0.9 K/UL (ref 1–5.1)
LYMPHOCYTES RELATIVE PERCENT: 9.4 %
MCH RBC QN AUTO: 25.6 PG (ref 26–34)
MCHC RBC AUTO-ENTMCNC: 32.3 G/DL (ref 31–36)
MCV RBC AUTO: 79.3 FL (ref 80–100)
MONOCYTES ABSOLUTE: 0.9 K/UL (ref 0–1.3)
MONOCYTES RELATIVE PERCENT: 8.9 %
NEUTROPHILS ABSOLUTE: 7.9 K/UL (ref 1.7–7.7)
NEUTROPHILS RELATIVE PERCENT: 79.3 %
PDW BLD-RTO: 20.8 % (ref 12.4–15.4)
PERFORMED ON: ABNORMAL
PLATELET # BLD: 324 K/UL (ref 135–450)
PMV BLD AUTO: 8.4 FL (ref 5–10.5)
POTASSIUM SERPL-SCNC: 3.9 MMOL/L (ref 3.5–5.1)
PROTHROMBIN TIME: 14.6 SEC (ref 10–13.2)
RBC # BLD: 3.08 M/UL (ref 4–5.2)
SODIUM BLD-SCNC: 124 MMOL/L (ref 136–145)
TOTAL PROTEIN: 7.2 G/DL (ref 6.4–8.2)
WBC # BLD: 10 K/UL (ref 4–11)

## 2020-08-08 PROCEDURE — 80053 COMPREHEN METABOLIC PANEL: CPT

## 2020-08-08 PROCEDURE — 2580000003 HC RX 258

## 2020-08-08 PROCEDURE — 2580000003 HC RX 258: Performed by: INTERNAL MEDICINE

## 2020-08-08 PROCEDURE — 6370000000 HC RX 637 (ALT 250 FOR IP): Performed by: INTERNAL MEDICINE

## 2020-08-08 PROCEDURE — 85025 COMPLETE CBC W/AUTO DIFF WBC: CPT

## 2020-08-08 PROCEDURE — 94640 AIRWAY INHALATION TREATMENT: CPT

## 2020-08-08 PROCEDURE — 2060000000 HC ICU INTERMEDIATE R&B

## 2020-08-08 PROCEDURE — 6370000000 HC RX 637 (ALT 250 FOR IP): Performed by: SURGERY

## 2020-08-08 PROCEDURE — 6360000002 HC RX W HCPCS: Performed by: INTERNAL MEDICINE

## 2020-08-08 PROCEDURE — 85610 PROTHROMBIN TIME: CPT

## 2020-08-08 PROCEDURE — 99231 SBSQ HOSP IP/OBS SF/LOW 25: CPT | Performed by: SURGERY

## 2020-08-08 RX ORDER — SODIUM CHLORIDE 9 MG/ML
INJECTION, SOLUTION INTRAVENOUS
Status: COMPLETED
Start: 2020-08-08 | End: 2020-08-08

## 2020-08-08 RX ORDER — MIDODRINE HYDROCHLORIDE 5 MG/1
10 TABLET ORAL 3 TIMES DAILY
Status: DISCONTINUED | OUTPATIENT
Start: 2020-08-08 | End: 2020-08-11

## 2020-08-08 RX ORDER — TOLVAPTAN 15 MG/1
15 TABLET ORAL ONCE
Status: COMPLETED | OUTPATIENT
Start: 2020-08-08 | End: 2020-08-08

## 2020-08-08 RX ADMIN — TIOTROPIUM BROMIDE INHALATION SPRAY 2 PUFF: 3.12 SPRAY, METERED RESPIRATORY (INHALATION) at 08:23

## 2020-08-08 RX ADMIN — BENZTROPINE MESYLATE 1 MG: 1 TABLET ORAL at 20:53

## 2020-08-08 RX ADMIN — CEFTRIAXONE SODIUM 1 G: 1 INJECTION, POWDER, FOR SOLUTION INTRAMUSCULAR; INTRAVENOUS at 20:53

## 2020-08-08 RX ADMIN — OXYCODONE HYDROCHLORIDE 10 MG: 5 TABLET ORAL at 08:44

## 2020-08-08 RX ADMIN — PANTOPRAZOLE SODIUM 40 MG: 40 TABLET, DELAYED RELEASE ORAL at 08:44

## 2020-08-08 RX ADMIN — Medication: at 08:47

## 2020-08-08 RX ADMIN — ASPIRIN 81 MG: 81 TABLET ORAL at 08:44

## 2020-08-08 RX ADMIN — BUSPIRONE HYDROCHLORIDE 30 MG: 5 TABLET ORAL at 08:43

## 2020-08-08 RX ADMIN — LAMOTRIGINE 200 MG: 100 TABLET ORAL at 20:53

## 2020-08-08 RX ADMIN — ATORVASTATIN CALCIUM 80 MG: 80 TABLET, FILM COATED ORAL at 20:53

## 2020-08-08 RX ADMIN — INSULIN LISPRO 6 UNITS: 100 INJECTION, SOLUTION INTRAVENOUS; SUBCUTANEOUS at 17:06

## 2020-08-08 RX ADMIN — TOLVAPTAN 15 MG: 15 TABLET ORAL at 13:02

## 2020-08-08 RX ADMIN — INSULIN LISPRO 6 UNITS: 100 INJECTION, SOLUTION INTRAVENOUS; SUBCUTANEOUS at 12:58

## 2020-08-08 RX ADMIN — FERROUS SULFATE TAB 325 MG (65 MG ELEMENTAL FE) 325 MG: 325 (65 FE) TAB at 17:12

## 2020-08-08 RX ADMIN — OXYCODONE HYDROCHLORIDE 10 MG: 5 TABLET ORAL at 13:02

## 2020-08-08 RX ADMIN — LAMOTRIGINE 200 MG: 100 TABLET ORAL at 08:44

## 2020-08-08 RX ADMIN — ARIPIPRAZOLE 10 MG: 10 TABLET ORAL at 08:44

## 2020-08-08 RX ADMIN — MIDODRINE HYDROCHLORIDE 10 MG: 5 TABLET ORAL at 13:05

## 2020-08-08 RX ADMIN — SODIUM CHLORIDE: 900 INJECTION, SOLUTION INTRAVENOUS at 20:53

## 2020-08-08 RX ADMIN — CLOPIDOGREL BISULFATE 75 MG: 75 TABLET ORAL at 08:44

## 2020-08-08 RX ADMIN — OXYCODONE HYDROCHLORIDE 10 MG: 5 TABLET ORAL at 17:11

## 2020-08-08 RX ADMIN — FERROUS SULFATE TAB 325 MG (65 MG ELEMENTAL FE) 325 MG: 325 (65 FE) TAB at 08:44

## 2020-08-08 RX ADMIN — PANTOPRAZOLE SODIUM 40 MG: 40 TABLET, DELAYED RELEASE ORAL at 17:05

## 2020-08-08 RX ADMIN — MIDODRINE HYDROCHLORIDE 7.5 MG: 5 TABLET ORAL at 06:03

## 2020-08-08 RX ADMIN — INSULIN LISPRO 4 UNITS: 100 INJECTION, SOLUTION INTRAVENOUS; SUBCUTANEOUS at 17:06

## 2020-08-08 RX ADMIN — BUSPIRONE HYDROCHLORIDE 30 MG: 5 TABLET ORAL at 20:53

## 2020-08-08 RX ADMIN — INSULIN LISPRO 6 UNITS: 100 INJECTION, SOLUTION INTRAVENOUS; SUBCUTANEOUS at 08:38

## 2020-08-08 RX ADMIN — INSULIN LISPRO 2 UNITS: 100 INJECTION, SOLUTION INTRAVENOUS; SUBCUTANEOUS at 12:57

## 2020-08-08 RX ADMIN — OXYCODONE HYDROCHLORIDE 10 MG: 5 TABLET ORAL at 02:47

## 2020-08-08 ASSESSMENT — PAIN SCALES - GENERAL
PAINLEVEL_OUTOF10: 8

## 2020-08-08 NOTE — PROGRESS NOTES
Progress Note          SUBJECTIVE:    We are following this patient for SHAUNNA; renovascular HTN. The patient had had elective renal arteriogram on 8/4 and was found to have significant L renal artery stenosis and she underwent successful stenting and angioplasty of the L renal artery. However, she returned several hours later due to acute onset of L flank pain. CT showed large subcapsular and perinephric hematoma. She had angiography with placement of coil in L renal artery subsegmental vessel on 8/5. Creat 1.2 on admit; 1.5 post-procedure. The patient was seen and examined; she feels well today with no CP, SOB, nausea or vomiting. ROS: No fever or chills. Social: Family at bedside. Physical Exam:    Weight: 254 lb 3.2 oz (115.3 kg), BP: 105/69       Constitutional:  Obese, mid-aged female. She is mild distress due to pain. Respiratory: Clear. Cardiovascular/Edema:  Reg rhythm; trace edema. Gastrointestinal: Obese; BS present. Marked L flank tenderness. Neurologic: No gross focal neurologic findings. Skin:  Wounds LE dressed. DATA:    Lab Results   Component Value Date    CREATININE 1.7 (H) 08/08/2020    BUN 48 (H) 08/08/2020     (L) 08/08/2020    K 3.9 08/08/2020    CL 83 (L) 08/08/2020    CO2 28 08/08/2020     Lab Results   Component Value Date    WBC 10.0 08/08/2020    HGB 7.9 (L) 08/08/2020    HCT 24.4 (L) 08/08/2020    MCV 79.3 (L) 08/08/2020     08/08/2020     IMPRESSION/RECOMMENDATIONS:    1. SHAUNNA. Not oliguric.  - Etiology: ATN (hypotension; contrast dye)   - Evaluate - Serial serum creat values; monitor UOP. - Creat stable vs post-procedure creat; not oliguric  - Plan: Maintain SBP; avoid nephrotoxic meds.     2. Perinephric hematoma. - Complication of L renal artery angioplasty / stent placement  - S/P coil embolization of subsegmental L renal artery     3.  Renovasc HTN.  - S/P L renal artery angioplasty / stent placement  - Follow for late recurrence of HTN (post-perinephric bleed)     4. Anemia - blood loss. - Hgb 7.9 - was 9.3 post-embolization  - S/P transfusion 2 units PRBC's  - Oral Fe added     5. Hyponatremia  - Na 124  - Will administer another dose of Tolvaptan      6.  Hypokalemia.  - Received KCl supplement  - K nl

## 2020-08-08 NOTE — PROGRESS NOTES
Vascular    S/p coil embo and renal stenting  Flank pain improved  No other complaints. Anxious to go home    AF VSS  abd soft  Ext warm    Lab Results   Component Value Date    WBC 10.0 08/08/2020    HGB 7.9 (L) 08/08/2020    HCT 24.4 (L) 08/08/2020    MCV 79.3 (L) 08/08/2020     08/08/2020     Lab Results   Component Value Date    CREATININE 1.7 (H) 08/08/2020    BUN 48 (H) 08/08/2020     (L) 08/08/2020    K 3.9 08/08/2020    CL 83 (L) 08/08/2020    CO2 28 08/08/2020       I/P:  Hemoglobin stable this am  Cr 1.7 which is near hospital baseline (defer to nephrology)  Ok to discharge from vascular perspective with follow up with Dr. Félix Gimenez in 2 weeks. John Willson M.D., FACS.  8/8/2020  8:15 AM

## 2020-08-08 NOTE — PROGRESS NOTES
4 Eyes Skin Assessment     The patient is being assess for   Shift Handoff    I agree that 2 RN's have performed a thorough Head to Toe Skin Assessment on the patient. ALL assessment sites listed below have been assessed. Areas assessed by both nurses:   [x]   Head, Face, and Ears   [x]   Shoulders, Back, and Chest, Abdomen  [x]   Arms, Elbows, and Hands   [x]   Coccyx, Sacrum, and Ischium  [x]   Legs, Feet, and Heels            **SHARE this note so that the co-signing nurse is able to place an eSignature**    Co-signer eSignature: Electronically signed by Conni Pallas, RN on 8/8/20 at 7:35 PM EDT    Does the Patient have Skin Breakdown?   Yes LDA WOUND CARE was Initiated documentation include the Shaye-wound, Wound Assessment, Measurements, Dressing Treatment, Drainage, and Color\",          Vivek Prevention initiated:  Yes   Wound Care Orders initiated:  Yes      [id number]  nurse consulted for Pressure Injury (Stage 3,4, Unstageable, DTI, NWPT, Complex wounds)and New or Established Ostomies:  Yes      Primary Nurse eSignature: Electronically signed by Carmen Key RN on 8/8/20 at 7:28 PM EDT

## 2020-08-08 NOTE — PLAN OF CARE
Problem: Falls - Risk of:  Goal: Will remain free from falls  Description: Will remain free from falls  8/8/2020 1415 by Ketty Barnhart RN  Note: Patient educated on fall risk precautions. Patient bed in low position, call light and bedside table within reach. No falls reported this shift. 8/8/2020 0334 by Akiko Simeon RN  Outcome: Ongoing  Goal: Absence of physical injury  Description: Absence of physical injury  8/8/2020 0334 by Akiko Simeon RN  Outcome: Ongoing     Problem: Pain:  Goal: Pain level will decrease  Description: Pain level will decrease  8/8/2020 1415 by Ketty Barnhart RN  Note: Patient educated on verbalizing complaints of pain utilizing the pain scale, pharmacological and non pharmacological pain control methods utilized. Will continue to monitor.     8/8/2020 0334 by Akiko Simeon RN  Outcome: Ongoing

## 2020-08-08 NOTE — PROGRESS NOTES
97.7 °F (36.5 °C) (Axillary)   Resp 16   Ht 5' 4.5\" (1.638 m)   Wt 254 lb 3.2 oz (115.3 kg)   LMP 10/24/2012   SpO2 97%   BMI 42.96 kg/m²     General appearance: No apparent distress, appears stated age and cooperative. HEENT: Pupils equal, round,  Conjunctivae/corneas clear. Neck: Supple, with full range of motion. No jugular venous distention. Trachea midline. Respiratory:  Normal respiratory effort. Clear to auscultation, bilaterally without Rales/Wheezes/Rhonchi  Cardiovascular: Regular rate and rhythm with normal S1/S2 without murmurs, rubs or gallops. Abdomen: Soft, non-tender, non-distended with normal bowel sounds. Musculoskeletal: No clubbing, cyanosis or edema bilaterally. Skin: warm and dry   Neurologic: Alert speech clear with no overt facial droop   psychiatric: Alert, not anxious appearing        Labs:   Recent Labs     08/06/20 0615 08/07/20 0424 08/08/20  0610   WBC 15.2* 13.3* 10.0   HGB 8.3* 7.9* 7.9*   HCT 25.8* 24.8* 24.4*    349 324     Recent Labs     08/06/20 0615 08/07/20 0424 08/08/20  0610   * 123* 124*   K 4.1 3.9 3.9   CL 87* 82* 83*   CO2 29 30 28   BUN 52* 48* 48*   CREATININE 1.5* 1.4* 1.7*   CALCIUM 9.1 9.4 9.0   PHOS 3.8  --   --      Recent Labs     08/06/20 0615 08/07/20 0424 08/08/20  0610   AST 12* 13* 14*   ALT 9* 8* 8*   BILITOT 1.2* 1.3* 1.4*   ALKPHOS 123 152* 168*     Recent Labs     08/06/20 0615 08/07/20 0424 08/08/20  0610   INR 1.24* 1.22* 1.26*     No results for input(s): CKTOTAL, TROPONINI in the last 72 hours. Urinalysis:      Lab Results   Component Value Date    NITRU Negative 08/04/2020    WBCUA None seen 08/04/2020    BACTERIA Rare 09/28/2019    RBCUA 3-4 08/04/2020    BLOODU TRACE-INTACT 08/04/2020    SPECGRAV 1.010 08/04/2020    GLUCOSEU Negative 08/04/2020       Radiology:  CT ABDOMEN PELVIS W IV CONTRAST Additional Contrast? None   Final Result   1. Large left subcapsular and perinephric hematoma, with active bleeding. Hematoma measures at least 12 cm in AP dimension, 11.3 cm in transverse   dimension, and 15 cm in cephalocaudal dimension, compressing and displacing   the left kidney. The hemorrhage extends both superiorly and inferiorly,   within the left retroperitoneum   2. Critical results were called by Dr. Niraj Simmons to Dr Sy Rousseau on 8/4/2020   at 18:22. Assessment/Plan:    Active Hospital Problems    Diagnosis    Traumatic perinephric hematoma, left, initial encounter [S37.092A]     Priority: High    CAD (coronary artery disease) [I25.10]     Priority: High    Acute kidney injury superimposed on CKD ([facility] [N17.9, N18.9]    Leukocytosis [D72.829]    Acute blood loss anemia [D62]    DM (diabetes mellitus), secondary uncontrolled [facility] [E13.65]    Morbid obesity with BMI of 40.0-44.9, adult ([facility]) [E66.01, Z68.41]    Multiple wounds of skin [R23.8]    Aortocoronary bypass status [Z95.1]    Left renal artery stenosis ([facility]) [I70.1]    Cardiomyopathy ([facility] [I42.9]    Chronic systolic heart failure ([facility]) [I50.22]    COPD (chronic obstructive pulmonary disease) ([facility]) [J44.9]    Hypokalemia [E87.6]    Stage 3 chronic kidney disease ([facility]) [N18.3]    Hyponatremia [E87.1]    GERD (gastroesophageal reflux disease) [K21.9]    PAD (peripheral artery disease) ([facility]) [I73.9]    Type 2 diabetes mellitus with diabetic polyneuropathy, with long-term current use of insulin ([facility]) [E11.42, Z79.4]    CLINT (obstructive sleep apnea) [G47.33]    Tobacco abuse disorder [Z72.0]    Essential hypertension [I10]     Perinephric hematoma : noted after renal angio on 8/4/2020. Vascular assisting. Now s/p coil embolization of left subsegmental renal artery and CVC placement. Plavix and aspirin resumed per vascular. [id number] for d/c per vascular with outpatient f/u in  2 weeks. SHAUNNA on CKD stage III: likely due ATN, contrast induced nephropathy from recent angiogram.  Nephro assisting. Cr stable.   Monitor and avoid nephrotoxins      Chronic systolic CHF : EF 87%. Diuretics on held due to initial hypotension. BP stable. Plan to resume when okay with nephrology. Close monitoring of vol status. Acute on chronic anemia: likely due to acute blood loss from hematoma. S/p 2 PRBCs. hgb stable. Monitor hgb daily and transfuse as needed if <7. GERD : continue PPI       COPD: stable. Continue inhalers. Leucocytosis : likely due to postsurgical stress response. Pt afebrile with no overt signs of infection. On empiric antibiotics per critical care. Wbc normalized       DMII : continue lantus and SSI. Hyponatremia: Nephro assisting.  s/p tolvaptan on 8/7. Minimal improvement. Mgt per nephro          DVT Prophylaxis: SCDs.    Diet: DIET CARDIAC; Carb Control: 5 carb choices (75 gms)/meal  Code Status: Full Code    PT/OT Eval Status: 24 hr assist- pt reports she has family who can provide this       Dispo - when ok with Sobeida Bess MD

## 2020-08-08 NOTE — PROGRESS NOTES
Bedside report received from [facility]. Patient resting comfortably in bed. No signs of discomfort or distress. Bed is in lowest position, wheels locked, 2/2 side rails up. Bedside table and call light within reach. White board updated. Will continue to monitor patient. Tim Vasquez RN    8:56 PM  VSS. BS 56 then 61. Currently drinking apple juice and having a snack. Message sent to provider to ask about holding tonights lantus. Tim Vasquez RN    12:31 AM  PRN roxicodone given for pain. Tim Vasquez RN    5:50 AM  VSS. No needs at this time. Tim Vasquez RN    5:58 AM  Morning labs drawn per orders and sent to lab via tube system.  Tim Vasquez RN

## 2020-08-09 LAB
A/G RATIO: 0.9 (ref 1.1–2.2)
A/G RATIO: 1 (ref 1.1–2.2)
ALBUMIN SERPL-MCNC: 3.6 G/DL (ref 3.4–5)
ALBUMIN SERPL-MCNC: 3.6 G/DL (ref 3.4–5)
ALP BLD-CCNC: 262 U/L (ref 40–129)
ALP BLD-CCNC: 272 U/L (ref 40–129)
ALT SERPL-CCNC: 13 U/L (ref 10–40)
ALT SERPL-CCNC: 16 U/L (ref 10–40)
ANION GAP SERPL CALCULATED.3IONS-SCNC: 13 MMOL/L (ref 3–16)
ANION GAP SERPL CALCULATED.3IONS-SCNC: 13 MMOL/L (ref 3–16)
AST SERPL-CCNC: 24 U/L (ref 15–37)
AST SERPL-CCNC: 35 U/L (ref 15–37)
BASOPHILS ABSOLUTE: 0.1 K/UL (ref 0–0.2)
BASOPHILS RELATIVE PERCENT: 0.7 %
BILIRUB SERPL-MCNC: 1.9 MG/DL (ref 0–1)
BILIRUB SERPL-MCNC: 2 MG/DL (ref 0–1)
BUN BLDV-MCNC: 51 MG/DL (ref 7–20)
BUN BLDV-MCNC: 54 MG/DL (ref 7–20)
CALCIUM SERPL-MCNC: 8.9 MG/DL (ref 8.3–10.6)
CALCIUM SERPL-MCNC: 8.9 MG/DL (ref 8.3–10.6)
CHLORIDE BLD-SCNC: 81 MMOL/L (ref 99–110)
CHLORIDE BLD-SCNC: 81 MMOL/L (ref 99–110)
CO2: 26 MMOL/L (ref 21–32)
CO2: 27 MMOL/L (ref 21–32)
CREAT SERPL-MCNC: 1.8 MG/DL (ref 0.6–1.1)
CREAT SERPL-MCNC: 2 MG/DL (ref 0.6–1.1)
EOSINOPHILS ABSOLUTE: 0.1 K/UL (ref 0–0.6)
EOSINOPHILS RELATIVE PERCENT: 0.6 %
GFR AFRICAN AMERICAN: 31
GFR AFRICAN AMERICAN: 35
GFR NON-AFRICAN AMERICAN: 26
GFR NON-AFRICAN AMERICAN: 29
GLOBULIN: 3.6 G/DL
GLOBULIN: 3.8 G/DL
GLUCOSE BLD-MCNC: 110 MG/DL (ref 70–99)
GLUCOSE BLD-MCNC: 124 MG/DL (ref 70–99)
GLUCOSE BLD-MCNC: 142 MG/DL (ref 70–99)
GLUCOSE BLD-MCNC: 154 MG/DL (ref 70–99)
GLUCOSE BLD-MCNC: 96 MG/DL (ref 70–99)
GLUCOSE BLD-MCNC: 97 MG/DL (ref 70–99)
HCT VFR BLD CALC: 25.6 % (ref 36–48)
HEMOGLOBIN: 8.2 G/DL (ref 12–16)
INR BLD: 1.28 (ref 0.86–1.14)
LYMPHOCYTES ABSOLUTE: 0.6 K/UL (ref 1–5.1)
LYMPHOCYTES RELATIVE PERCENT: 6.1 %
MCH RBC QN AUTO: 25.6 PG (ref 26–34)
MCHC RBC AUTO-ENTMCNC: 32.1 G/DL (ref 31–36)
MCV RBC AUTO: 79.9 FL (ref 80–100)
MONOCYTES ABSOLUTE: 0.9 K/UL (ref 0–1.3)
MONOCYTES RELATIVE PERCENT: 9 %
NEUTROPHILS ABSOLUTE: 8.5 K/UL (ref 1.7–7.7)
NEUTROPHILS RELATIVE PERCENT: 83.6 %
PDW BLD-RTO: 20.6 % (ref 12.4–15.4)
PERFORMED ON: ABNORMAL
PERFORMED ON: NORMAL
PLATELET # BLD: 371 K/UL (ref 135–450)
PMV BLD AUTO: 8.5 FL (ref 5–10.5)
POTASSIUM SERPL-SCNC: 4.2 MMOL/L (ref 3.5–5.1)
POTASSIUM SERPL-SCNC: 4.8 MMOL/L (ref 3.5–5.1)
PROTHROMBIN TIME: 14.9 SEC (ref 10–13.2)
RBC # BLD: 3.2 M/UL (ref 4–5.2)
SODIUM BLD-SCNC: 120 MMOL/L (ref 136–145)
SODIUM BLD-SCNC: 121 MMOL/L (ref 136–145)
TOTAL PROTEIN: 7.2 G/DL (ref 6.4–8.2)
TOTAL PROTEIN: 7.4 G/DL (ref 6.4–8.2)
WBC # BLD: 10.2 K/UL (ref 4–11)

## 2020-08-09 PROCEDURE — 94640 AIRWAY INHALATION TREATMENT: CPT

## 2020-08-09 PROCEDURE — 6370000000 HC RX 637 (ALT 250 FOR IP): Performed by: INTERNAL MEDICINE

## 2020-08-09 PROCEDURE — 2060000000 HC ICU INTERMEDIATE R&B

## 2020-08-09 PROCEDURE — 99231 SBSQ HOSP IP/OBS SF/LOW 25: CPT | Performed by: SURGERY

## 2020-08-09 PROCEDURE — 6360000002 HC RX W HCPCS: Performed by: INTERNAL MEDICINE

## 2020-08-09 PROCEDURE — 80053 COMPREHEN METABOLIC PANEL: CPT

## 2020-08-09 PROCEDURE — 6370000000 HC RX 637 (ALT 250 FOR IP): Performed by: SURGERY

## 2020-08-09 PROCEDURE — 85025 COMPLETE CBC W/AUTO DIFF WBC: CPT

## 2020-08-09 PROCEDURE — 85610 PROTHROMBIN TIME: CPT

## 2020-08-09 RX ORDER — TOLVAPTAN 30 MG/1
30 TABLET ORAL ONCE
Status: COMPLETED | OUTPATIENT
Start: 2020-08-09 | End: 2020-08-09

## 2020-08-09 RX ORDER — INSULIN GLARGINE 100 [IU]/ML
10 INJECTION, SOLUTION SUBCUTANEOUS NIGHTLY
Status: DISCONTINUED | OUTPATIENT
Start: 2020-08-09 | End: 2020-08-12 | Stop reason: HOSPADM

## 2020-08-09 RX ORDER — DOCUSATE SODIUM 100 MG/1
200 CAPSULE, LIQUID FILLED ORAL 2 TIMES DAILY
Status: DISCONTINUED | OUTPATIENT
Start: 2020-08-09 | End: 2020-08-12 | Stop reason: HOSPADM

## 2020-08-09 RX ORDER — CALCIUM POLYCARBOPHIL 625 MG 625 MG/1
625 TABLET ORAL NIGHTLY
Status: DISCONTINUED | OUTPATIENT
Start: 2020-08-09 | End: 2020-08-12 | Stop reason: HOSPADM

## 2020-08-09 RX ADMIN — BENZTROPINE MESYLATE 1 MG: 1 TABLET ORAL at 20:44

## 2020-08-09 RX ADMIN — PANTOPRAZOLE SODIUM 40 MG: 40 TABLET, DELAYED RELEASE ORAL at 18:18

## 2020-08-09 RX ADMIN — FERROUS SULFATE TAB 325 MG (65 MG ELEMENTAL FE) 325 MG: 325 (65 FE) TAB at 18:17

## 2020-08-09 RX ADMIN — LAMOTRIGINE 200 MG: 100 TABLET ORAL at 10:13

## 2020-08-09 RX ADMIN — DOCUSATE SODIUM 200 MG: 100 CAPSULE, LIQUID FILLED ORAL at 20:44

## 2020-08-09 RX ADMIN — Medication: at 12:57

## 2020-08-09 RX ADMIN — INSULIN LISPRO 3 UNITS: 100 INJECTION, SOLUTION INTRAVENOUS; SUBCUTANEOUS at 18:18

## 2020-08-09 RX ADMIN — OXYCODONE HYDROCHLORIDE 10 MG: 5 TABLET ORAL at 14:49

## 2020-08-09 RX ADMIN — PANTOPRAZOLE SODIUM 40 MG: 40 TABLET, DELAYED RELEASE ORAL at 10:13

## 2020-08-09 RX ADMIN — OXYCODONE HYDROCHLORIDE 10 MG: 5 TABLET ORAL at 00:31

## 2020-08-09 RX ADMIN — BUSPIRONE HYDROCHLORIDE 30 MG: 5 TABLET ORAL at 10:12

## 2020-08-09 RX ADMIN — FERROUS SULFATE TAB 325 MG (65 MG ELEMENTAL FE) 325 MG: 325 (65 FE) TAB at 10:13

## 2020-08-09 RX ADMIN — LAMOTRIGINE 200 MG: 100 TABLET ORAL at 20:44

## 2020-08-09 RX ADMIN — MIDODRINE HYDROCHLORIDE 10 MG: 5 TABLET ORAL at 08:29

## 2020-08-09 RX ADMIN — MIDODRINE HYDROCHLORIDE 10 MG: 5 TABLET ORAL at 12:55

## 2020-08-09 RX ADMIN — CLOPIDOGREL BISULFATE 75 MG: 75 TABLET ORAL at 10:13

## 2020-08-09 RX ADMIN — ATORVASTATIN CALCIUM 80 MG: 80 TABLET, FILM COATED ORAL at 20:44

## 2020-08-09 RX ADMIN — INSULIN LISPRO 1 UNITS: 100 INJECTION, SOLUTION INTRAVENOUS; SUBCUTANEOUS at 12:48

## 2020-08-09 RX ADMIN — BUSPIRONE HYDROCHLORIDE 30 MG: 5 TABLET ORAL at 20:43

## 2020-08-09 RX ADMIN — ARIPIPRAZOLE 10 MG: 10 TABLET ORAL at 10:13

## 2020-08-09 RX ADMIN — HYDROMORPHONE HYDROCHLORIDE 0.5 MG: 2 INJECTION, SOLUTION INTRAMUSCULAR; INTRAVENOUS; SUBCUTANEOUS at 17:10

## 2020-08-09 RX ADMIN — INSULIN LISPRO 1 UNITS: 100 INJECTION, SOLUTION INTRAVENOUS; SUBCUTANEOUS at 08:25

## 2020-08-09 RX ADMIN — TIOTROPIUM BROMIDE INHALATION SPRAY 2 PUFF: 3.12 SPRAY, METERED RESPIRATORY (INHALATION) at 08:23

## 2020-08-09 RX ADMIN — ASPIRIN 81 MG: 81 TABLET ORAL at 10:13

## 2020-08-09 RX ADMIN — INSULIN GLARGINE 10 UNITS: 100 INJECTION, SOLUTION SUBCUTANEOUS at 20:45

## 2020-08-09 RX ADMIN — TOLVAPTAN 30 MG: 30 TABLET ORAL at 12:42

## 2020-08-09 RX ADMIN — OXYCODONE HYDROCHLORIDE 10 MG: 5 TABLET ORAL at 10:13

## 2020-08-09 RX ADMIN — INSULIN LISPRO 6 UNITS: 100 INJECTION, SOLUTION INTRAVENOUS; SUBCUTANEOUS at 08:25

## 2020-08-09 RX ADMIN — INSULIN LISPRO 3 UNITS: 100 INJECTION, SOLUTION INTRAVENOUS; SUBCUTANEOUS at 12:49

## 2020-08-09 ASSESSMENT — PAIN SCALES - GENERAL
PAINLEVEL_OUTOF10: 7
PAINLEVEL_OUTOF10: 7
PAINLEVEL_OUTOF10: 8

## 2020-08-09 NOTE — PROGRESS NOTES
Progress Note          SUBJECTIVE:    We are following this patient for SHAUNNA; renovascular HTN. The patient had had elective renal arteriogram on 8/4 and was found to have significant L renal artery stenosis and she underwent successful stenting and angioplasty of the L renal artery. However, she returned several hours later due to acute onset of L flank pain. CT showed large subcapsular and perinephric hematoma. She had angiography with placement of coil in L renal artery subsegmental vessel on 8/5. Creat 1.2 on admit; 1.5 post-procedure. The patient was seen and examined; she feels well today with no CP, SOB, nausea or vomiting. ROS: No fever or chills. Social: No family at bedside. Physical Exam:    Weight: 254 lb 8 oz (115.4 kg), BP: 114/76       Constitutional:  Obese, mid-aged female. She is mild distress due to pain. Respiratory: Clear. Cardiovascular/Edema:  Reg rhythm; trace edema. Gastrointestinal: Obese; BS present. Marked L flank tenderness. Neurologic: No gross focal neurologic findings. Skin:  Wounds LE dressed. DATA:    Lab Results   Component Value Date    CREATININE 1.8 (H) 08/09/2020    BUN 51 (H) 08/09/2020     (L) 08/09/2020    K 4.2 08/09/2020    CL 81 (L) 08/09/2020    CO2 26 08/09/2020     Lab Results   Component Value Date    WBC 10.2 08/09/2020    HGB 8.2 (L) 08/09/2020    HCT 25.6 (L) 08/09/2020    MCV 79.9 (L) 08/09/2020     08/09/2020     IMPRESSION/RECOMMENDATIONS:    1. SHAUNNA. Not oliguric.  - Etiology: ATN (hypotension; contrast dye)   - Evaluate - Serial serum creat values; monitor UOP. - Creat stable vs post-procedure creat; not oliguric  - Plan: Maintain SBP; avoid nephrotoxic meds.     2. Perinephric hematoma. - Complication of L renal artery angioplasty / stent placement  - S/P coil embolization of subsegmental L renal artery     3.  Renovasc HTN.  - S/P L renal artery angioplasty / stent placement  - Follow for late recurrence of HTN (post-perinephric bleed)     4. Anemia - blood loss. - Hgb 7.9 - was 9.3 post-embolization  - S/P transfusion 2 units PRBC's  - Oral Fe added     5. Hyponatremia  - Na 120  - Will administer an increased dose of Tolvaptan      6.  Hypokalemia.  - Received KCl supplement  - K nl

## 2020-08-09 NOTE — PLAN OF CARE
Problem: Falls - Risk of:  Goal: Will remain free from falls  Description: Will remain free from falls  Outcome: Met This Shift  Note: Patient educated on fall risk precautions. Patient bed in low position, call light and bedside table within reach. No falls reported this shift. Goal: Absence of physical injury  Description: Absence of physical injury  Outcome: Met This Shift     Problem: Pain:  Goal: Pain level will decrease  Description: Pain level will decrease  Outcome: Met This Shift  Note: Patient educated on verbalizing complaints of pain utilizing the pain scale, pharmacological and non pharmacological pain control methods utilized. Will continue to monitor.     Goal: Control of acute pain  Description: Control of acute pain  Outcome: Met This Shift  Goal: Control of chronic pain  Description: Control of chronic pain  Outcome: Met This Shift     Problem: Bleeding:  Goal: Will show no signs and symptoms of excessive bleeding  Description: Will show no signs and symptoms of excessive bleeding  Outcome: Met This Shift     Problem: Skin Integrity:  Goal: Will show no infection signs and symptoms  Description: Will show no infection signs and symptoms  Outcome: Met This Shift  Goal: Absence of new skin breakdown  Description: Absence of new skin breakdown  Outcome: Met This Shift

## 2020-08-09 NOTE — PROGRESS NOTES
Physical Exam Performed:    /76   Pulse 98   Temp 99.4 °F (37.4 °C) (Oral)   Resp 17   Ht 5' 4.5\" (1.638 m)   Wt 254 lb 8 oz (115.4 kg)   LMP 10/24/2012   SpO2 98%   BMI 43.01 kg/m²     General appearance: No apparent distress, appears stated age and cooperative. HEENT: Pupils equal, round,  Conjunctivae/corneas clear. Neck: Supple, with full range of motion. No jugular venous distention. Trachea midline. Respiratory:  Normal respiratory effort. Clear to auscultation, bilaterally without Rales/Wheezes/Rhonchi  Cardiovascular: Regular rate and rhythm with normal S1/S2 without murmurs, rubs or gallops. Abdomen: Soft, non-tender, non-distended with normal bowel sounds. Musculoskeletal: No clubbing, cyanosis or edema bilaterally. Skin: warm and dry   Neurologic: Alert speech clear with no overt facial droop   psychiatric: Alert, not anxious appearing        Labs:   Recent Labs     08/07/20 0424 08/08/20 0610 08/09/20  0600   WBC 13.3* 10.0 10.2   HGB 7.9* 7.9* 8.2*   HCT 24.8* 24.4* 25.6*    324 371     Recent Labs     08/07/20 0424 08/08/20 0610 08/09/20  0600   * 124* 120*   K 3.9 3.9 4.2   CL 82* 83* 81*   CO2 30 28 26   BUN 48* 48* 51*   CREATININE 1.4* 1.7* 1.8*   CALCIUM 9.4 9.0 8.9     Recent Labs     08/07/20 0424 08/08/20 0610 08/09/20  0600   AST 13* 14* 24   ALT 8* 8* 13   BILITOT 1.3* 1.4* 1.9*   ALKPHOS 152* 168* 262*     Recent Labs     08/07/20 0424 08/08/20 0610 08/09/20  0600   INR 1.22* 1.26* 1.28*     No results for input(s): Daleen Cocks in the last 72 hours. Urinalysis:      Lab Results   Component Value Date    NITRU Negative 08/04/2020    WBCUA None seen 08/04/2020    BACTERIA Rare 09/28/2019    RBCUA 3-4 08/04/2020    BLOODU TRACE-INTACT 08/04/2020    SPECGRAV 1.010 08/04/2020    GLUCOSEU Negative 08/04/2020       Radiology:  CT ABDOMEN PELVIS W IV CONTRAST Additional Contrast? None   Final Result   1.  Large left subcapsular and perinephric hematoma, with active bleeding. Hematoma measures at least 12 cm in AP dimension, 11.3 cm in transverse   dimension, and 15 cm in cephalocaudal dimension, compressing and displacing   the left kidney. The hemorrhage extends both superiorly and inferiorly,   within the left retroperitoneum   2. Critical results were called by Dr. Eloy Colorado to Dr Josiah Jacobo on 8/4/2020   at 18:22. Assessment/Plan:    Active Hospital Problems    Diagnosis    Traumatic perinephric hematoma, left, initial encounter [S37.092A]     Priority: High    CAD (coronary artery disease) [I25.10]     Priority: High    Acute kidney injury superimposed on CKD ([facility] [N17.9, N18.9]    Leukocytosis [D72.829]    Acute blood loss anemia [D62]    DM (diabetes mellitus), secondary uncontrolled ([facility] [E13.65]    Morbid obesity with BMI of 40.0-44.9, adult ([facility]) [E66.01, Z68.41]    Multiple wounds of skin [R23.8]    Aortocoronary bypass status [Z95.1]    Left renal artery stenosis ([facility]) [I70.1]    Cardiomyopathy ([facility] [I42.9]    Chronic systolic heart failure ([facility]) [I50.22]    COPD (chronic obstructive pulmonary disease) ([facility]) [J44.9]    Hypokalemia [E87.6]    Stage 3 chronic kidney disease ([facility]) [N18.3]    Hyponatremia [E87.1]    GERD (gastroesophageal reflux disease) [K21.9]    PAD (peripheral artery disease) ([facility]) [I73.9]    Type 2 diabetes mellitus with diabetic polyneuropathy, with long-term current use of insulin ([facility]) [E11.42, Z79.4]    CLINT (obstructive sleep apnea) [G47.33]    Tobacco abuse disorder [Z72.0]    Essential hypertension [I10]     Perinephric hematoma : noted after renal angio on 8/4/2020. Vascular assisting. Now s/p coil embolization of left subsegmental renal artery and CVC placement. Plavix and aspirin resumed per vascular. Mohan Rodriguez for d/c per vascular with outpatient f/u in  2 weeks. SHAUNNA on CKD stage III: likely due ATN, contrast induced nephropathy from recent angiogram.  Nephro assisting.

## 2020-08-09 NOTE — FLOWSHEET NOTE
08/09/20 1149   Encounter Summary   Services provided to: Patient not available   Referral/Consult From: [facility] Children;Friends/neighbors   Place of [facility] none listed   Continue Visiting   (8/9 Tel.-no answer;silent  prayer of hope/recovery)   Length of Encounter 15 minutes

## 2020-08-09 NOTE — PROGRESS NOTES
Bedside report received from [facility]. Patient resting comfortably in bed. No signs of discomfort or distress. Bed is in lowest position, wheels locked, 2/2 side rails up. Bedside table and call light within reach. White board updated. Will continue to monitor patient. Karena Isbell RN    9:12 PM  VSS. No needs at this time. Karena Isbell RN    1:24 AM  PRN oxy given for pain. Karena Isbell RN    4:59 AM  VSS. Blood drawn per orders and sent to lab via tube system. No additional needs at this time.  Karena Isbell RN

## 2020-08-10 LAB
A/G RATIO: 1 (ref 1.1–2.2)
A/G RATIO: 1 (ref 1.1–2.2)
ALBUMIN SERPL-MCNC: 3.6 G/DL (ref 3.4–5)
ALBUMIN SERPL-MCNC: 3.8 G/DL (ref 3.4–5)
ALP BLD-CCNC: 272 U/L (ref 40–129)
ALP BLD-CCNC: 291 U/L (ref 40–129)
ALT SERPL-CCNC: 23 U/L (ref 10–40)
ALT SERPL-CCNC: 25 U/L (ref 10–40)
ANION GAP SERPL CALCULATED.3IONS-SCNC: 14 MMOL/L (ref 3–16)
ANION GAP SERPL CALCULATED.3IONS-SCNC: 14 MMOL/L (ref 3–16)
AST SERPL-CCNC: 41 U/L (ref 15–37)
AST SERPL-CCNC: 46 U/L (ref 15–37)
BASOPHILS ABSOLUTE: 0.1 K/UL (ref 0–0.2)
BASOPHILS RELATIVE PERCENT: 0.7 %
BILIRUB SERPL-MCNC: 2.5 MG/DL (ref 0–1)
BILIRUB SERPL-MCNC: 2.8 MG/DL (ref 0–1)
BUN BLDV-MCNC: 51 MG/DL (ref 7–20)
BUN BLDV-MCNC: 53 MG/DL (ref 7–20)
CALCIUM SERPL-MCNC: 9 MG/DL (ref 8.3–10.6)
CALCIUM SERPL-MCNC: 9.3 MG/DL (ref 8.3–10.6)
CHLORIDE BLD-SCNC: 82 MMOL/L (ref 99–110)
CHLORIDE BLD-SCNC: 85 MMOL/L (ref 99–110)
CO2: 27 MMOL/L (ref 21–32)
CO2: 28 MMOL/L (ref 21–32)
CREAT SERPL-MCNC: 1.9 MG/DL (ref 0.6–1.1)
CREAT SERPL-MCNC: 1.9 MG/DL (ref 0.6–1.1)
EOSINOPHILS ABSOLUTE: 0.1 K/UL (ref 0–0.6)
EOSINOPHILS RELATIVE PERCENT: 1 %
GFR AFRICAN AMERICAN: 33
GFR AFRICAN AMERICAN: 33
GFR NON-AFRICAN AMERICAN: 27
GFR NON-AFRICAN AMERICAN: 27
GLOBULIN: 3.7 G/DL
GLOBULIN: 3.7 G/DL
GLUCOSE BLD-MCNC: 109 MG/DL (ref 70–99)
GLUCOSE BLD-MCNC: 121 MG/DL (ref 70–99)
GLUCOSE BLD-MCNC: 123 MG/DL (ref 70–99)
GLUCOSE BLD-MCNC: 124 MG/DL (ref 70–99)
GLUCOSE BLD-MCNC: 198 MG/DL (ref 70–99)
GLUCOSE BLD-MCNC: 88 MG/DL (ref 70–99)
HCT VFR BLD CALC: 25.9 % (ref 36–48)
HEMOGLOBIN: 8.3 G/DL (ref 12–16)
INR BLD: 1.31 (ref 0.86–1.14)
LYMPHOCYTES ABSOLUTE: 0.7 K/UL (ref 1–5.1)
LYMPHOCYTES RELATIVE PERCENT: 7.9 %
MCH RBC QN AUTO: 25.6 PG (ref 26–34)
MCHC RBC AUTO-ENTMCNC: 31.9 G/DL (ref 31–36)
MCV RBC AUTO: 80.2 FL (ref 80–100)
MONOCYTES ABSOLUTE: 0.8 K/UL (ref 0–1.3)
MONOCYTES RELATIVE PERCENT: 9.2 %
NEUTROPHILS ABSOLUTE: 7.4 K/UL (ref 1.7–7.7)
NEUTROPHILS RELATIVE PERCENT: 81.2 %
PDW BLD-RTO: 21.9 % (ref 12.4–15.4)
PERFORMED ON: ABNORMAL
PLATELET # BLD: 378 K/UL (ref 135–450)
PMV BLD AUTO: 8.3 FL (ref 5–10.5)
POTASSIUM SERPL-SCNC: 3.9 MMOL/L (ref 3.5–5.1)
POTASSIUM SERPL-SCNC: 4.3 MMOL/L (ref 3.5–5.1)
PROTHROMBIN TIME: 15.2 SEC (ref 10–13.2)
RBC # BLD: 3.22 M/UL (ref 4–5.2)
SODIUM BLD-SCNC: 123 MMOL/L (ref 136–145)
SODIUM BLD-SCNC: 127 MMOL/L (ref 136–145)
SODIUM URINE: <20 MMOL/L
TOTAL PROTEIN: 7.3 G/DL (ref 6.4–8.2)
TOTAL PROTEIN: 7.5 G/DL (ref 6.4–8.2)
URIC ACID, SERUM: 16 MG/DL (ref 2.6–6)
WBC # BLD: 9.1 K/UL (ref 4–11)

## 2020-08-10 PROCEDURE — 83935 ASSAY OF URINE OSMOLALITY: CPT

## 2020-08-10 PROCEDURE — 6370000000 HC RX 637 (ALT 250 FOR IP): Performed by: INTERNAL MEDICINE

## 2020-08-10 PROCEDURE — 2580000003 HC RX 258: Performed by: INTERNAL MEDICINE

## 2020-08-10 PROCEDURE — 97116 GAIT TRAINING THERAPY: CPT

## 2020-08-10 PROCEDURE — 6370000000 HC RX 637 (ALT 250 FOR IP): Performed by: SURGERY

## 2020-08-10 PROCEDURE — 84300 ASSAY OF URINE SODIUM: CPT

## 2020-08-10 PROCEDURE — 80053 COMPREHEN METABOLIC PANEL: CPT

## 2020-08-10 PROCEDURE — 85610 PROTHROMBIN TIME: CPT

## 2020-08-10 PROCEDURE — 2060000000 HC ICU INTERMEDIATE R&B

## 2020-08-10 PROCEDURE — 84550 ASSAY OF BLOOD/URIC ACID: CPT

## 2020-08-10 PROCEDURE — 85025 COMPLETE CBC W/AUTO DIFF WBC: CPT

## 2020-08-10 PROCEDURE — 6360000002 HC RX W HCPCS: Performed by: INTERNAL MEDICINE

## 2020-08-10 RX ORDER — FUROSEMIDE 10 MG/ML
80 INJECTION INTRAMUSCULAR; INTRAVENOUS ONCE
Status: COMPLETED | OUTPATIENT
Start: 2020-08-10 | End: 2020-08-10

## 2020-08-10 RX ADMIN — MIDODRINE HYDROCHLORIDE 10 MG: 5 TABLET ORAL at 11:52

## 2020-08-10 RX ADMIN — Medication: at 08:34

## 2020-08-10 RX ADMIN — BUSPIRONE HYDROCHLORIDE 30 MG: 5 TABLET ORAL at 20:51

## 2020-08-10 RX ADMIN — OXYCODONE HYDROCHLORIDE 10 MG: 5 TABLET ORAL at 08:59

## 2020-08-10 RX ADMIN — ATORVASTATIN CALCIUM 80 MG: 80 TABLET, FILM COATED ORAL at 20:51

## 2020-08-10 RX ADMIN — OXYCODONE HYDROCHLORIDE 10 MG: 5 TABLET ORAL at 16:02

## 2020-08-10 RX ADMIN — PANTOPRAZOLE SODIUM 40 MG: 40 TABLET, DELAYED RELEASE ORAL at 08:34

## 2020-08-10 RX ADMIN — INSULIN GLARGINE 10 UNITS: 100 INJECTION, SOLUTION SUBCUTANEOUS at 20:51

## 2020-08-10 RX ADMIN — BUSPIRONE HYDROCHLORIDE 30 MG: 5 TABLET ORAL at 08:32

## 2020-08-10 RX ADMIN — INSULIN LISPRO 1 UNITS: 100 INJECTION, SOLUTION INTRAVENOUS; SUBCUTANEOUS at 20:52

## 2020-08-10 RX ADMIN — MIDODRINE HYDROCHLORIDE 10 MG: 5 TABLET ORAL at 16:01

## 2020-08-10 RX ADMIN — INSULIN LISPRO 3 UNITS: 100 INJECTION, SOLUTION INTRAVENOUS; SUBCUTANEOUS at 08:35

## 2020-08-10 RX ADMIN — PANTOPRAZOLE SODIUM 40 MG: 40 TABLET, DELAYED RELEASE ORAL at 16:02

## 2020-08-10 RX ADMIN — Medication 10 ML: at 08:34

## 2020-08-10 RX ADMIN — BENZTROPINE MESYLATE 1 MG: 1 TABLET ORAL at 20:51

## 2020-08-10 RX ADMIN — FERROUS SULFATE TAB 325 MG (65 MG ELEMENTAL FE) 325 MG: 325 (65 FE) TAB at 08:33

## 2020-08-10 RX ADMIN — DOCUSATE SODIUM 200 MG: 100 CAPSULE, LIQUID FILLED ORAL at 20:51

## 2020-08-10 RX ADMIN — LAMOTRIGINE 200 MG: 100 TABLET ORAL at 08:33

## 2020-08-10 RX ADMIN — ASPIRIN 81 MG: 81 TABLET ORAL at 08:33

## 2020-08-10 RX ADMIN — LAMOTRIGINE 200 MG: 100 TABLET ORAL at 20:51

## 2020-08-10 RX ADMIN — OXYCODONE HYDROCHLORIDE 10 MG: 5 TABLET ORAL at 01:23

## 2020-08-10 RX ADMIN — Medication 10 ML: at 11:49

## 2020-08-10 RX ADMIN — FERROUS SULFATE TAB 325 MG (65 MG ELEMENTAL FE) 325 MG: 325 (65 FE) TAB at 16:02

## 2020-08-10 RX ADMIN — CLOPIDOGREL BISULFATE 75 MG: 75 TABLET ORAL at 08:33

## 2020-08-10 RX ADMIN — MIDODRINE HYDROCHLORIDE 10 MG: 5 TABLET ORAL at 08:33

## 2020-08-10 RX ADMIN — FUROSEMIDE 80 MG: 10 INJECTION, SOLUTION INTRAMUSCULAR; INTRAVENOUS at 11:49

## 2020-08-10 RX ADMIN — DOCUSATE SODIUM 200 MG: 100 CAPSULE, LIQUID FILLED ORAL at 08:34

## 2020-08-10 RX ADMIN — ARIPIPRAZOLE 10 MG: 10 TABLET ORAL at 08:33

## 2020-08-10 ASSESSMENT — PAIN SCALES - GENERAL
PAINLEVEL_OUTOF10: 7
PAINLEVEL_OUTOF10: 8
PAINLEVEL_OUTOF10: 0
PAINLEVEL_OUTOF10: 8
PAINLEVEL_OUTOF10: 0

## 2020-08-10 ASSESSMENT — PAIN DESCRIPTION - LOCATION: LOCATION: BACK

## 2020-08-10 ASSESSMENT — PAIN DESCRIPTION - PAIN TYPE: TYPE: ACUTE PAIN;CHRONIC PAIN

## 2020-08-10 ASSESSMENT — PAIN DESCRIPTION - ORIENTATION: ORIENTATION: LEFT

## 2020-08-10 ASSESSMENT — PAIN DESCRIPTION - PROGRESSION: CLINICAL_PROGRESSION: GRADUALLY WORSENING

## 2020-08-10 NOTE — PROGRESS NOTES
Hospitalist Progress Note      PCP: Tammy Mcnair PA-C    Date of Admission: 8/4/2020    Chief Complaint: L Flank Pain    Subjective: no new c/o. Medications:  Reviewed    Infusion Medications    dextrose       Scheduled Medications    furosemide  80 mg Intravenous Once    insulin glargine  10 Units Subcutaneous Nightly    insulin lispro  0-6 Units Subcutaneous TID     insulin lispro  0-3 Units Subcutaneous Nightly    insulin lispro  3 Units Subcutaneous TID     docusate sodium  200 mg Oral BID    polycarbophil  625 mg Oral Nightly    midodrine  10 mg Oral TID    ferrous sulfate  325 mg Oral BID WC    zinc oxide   Topical Daily    aspirin EC  81 mg Oral Daily    atorvastatin  80 mg Oral Nightly    ARIPiprazole  10 mg Oral Daily    benztropine  1 mg Oral Nightly    busPIRone  30 mg Oral BID    clopidogrel  75 mg Oral Daily    lamoTRIgine  200 mg Oral BID    pantoprazole  40 mg Oral BID    tiotropium  2 puff Inhalation Daily     PRN Meds: ondansetron, oxyCODONE **OR** oxyCODONE, HYDROmorphone, acetaminophen, diphenhydrAMINE, diphenhydrAMINE, traZODone, sodium chloride flush, glucose, dextrose, glucagon (rDNA), dextrose      Intake/Output Summary (Last 24 hours) at 8/10/2020 1042  Last data filed at 8/10/2020 0842  Gross per 24 hour   Intake 40 ml   Output 800 ml   Net -760 ml       Physical Exam Performed:    /78   Pulse 97   Temp 97.4 °F (36.3 °C)   Resp 18   Ht 5' 4.5\" (1.638 m)   Wt 254 lb 8 oz (115.4 kg)   LMP 10/24/2012   SpO2 97%   BMI 43.01 kg/m²     General appearance: No apparent distress, appears stated age and cooperative. HEENT: Pupils equal, round, and reactive to light. Conjunctivae/corneas clear. Neck: Supple, with full range of motion. No jugular venous distention. Trachea midline. Respiratory:  Normal respiratory effort. Clear to auscultation, bilaterally without Rales/Wheezes/Rhonchi.   Cardiovascular: Regular rate and rhythm with normal S1/S2 without murmurs, rubs or gallops. Abdomen: Soft, non-tender, non-distended with normal bowel sounds. Musculoskeletal: No clubbing, cyanosis or edema bilaterally. Full range of motion without deformity. Skin: Skin color, texture, turgor normal.  No rashes or lesions. Neurologic:  Neurovascularly intact without any focal sensory/motor deficits. Cranial nerves: II-XII intact, grossly non-focal.  Psychiatric: Alert and oriented, thought content appropriate, normal insight  Capillary Refill: Brisk,< 3 seconds   Peripheral Pulses: +2 palpable, equal bilaterally       Labs:   Recent Labs     08/08/20  0610 08/09/20  0600 08/10/20  0450   WBC 10.0 10.2 9.1   HGB 7.9* 8.2* 8.3*   HCT 24.4* 25.6* 25.9*    371 378     Recent Labs     08/09/20  0600 08/09/20  1836 08/10/20  0450   * 121* 123*   K 4.2 4.8 4.3   CL 81* 81* 82*   CO2 26 27 27   BUN 51* 54* 53*   CREATININE 1.8* 2.0* 1.9*   CALCIUM 8.9 8.9 9.0     Recent Labs     08/09/20  0600 08/09/20  1836 08/10/20  0450   AST 24 35 46*   ALT 13 16 23   BILITOT 1.9* 2.0* 2.5*   ALKPHOS 262* 272* 272*     Recent Labs     08/08/20  0610 08/09/20  0600 08/10/20  0450   INR 1.26* 1.28* 1.31*     No results for input(s): Yordandavid ReyesChoudhary in the last 72 hours.     Urinalysis:      Lab Results   Component Value Date    NITRU Negative 08/04/2020    WBCUA None seen 08/04/2020    BACTERIA Rare 09/28/2019    RBCUA 3-4 08/04/2020    BLOODU TRACE-INTACT 08/04/2020    SPECGRAV 1.010 08/04/2020    GLUCOSEU Negative 08/04/2020       Consults:    IP CONSULT TO HOSPITALIST  IP CONSULT TO CRITICAL CARE  IP CONSULT TO NEPHROLOGY  IP CONSULT TO CASE MANAGEMENT      Assessment/Plan:    Active Hospital Problems    Diagnosis    CAD (coronary artery disease) [I25.10]     Priority: High    Acute kidney injury superimposed on CKD ([facility] [N17.9, N18.9]    Acute blood loss anemia [D62]    DM (diabetes mellitus), secondary uncontrolled ([address] [E13.65]    Morbid obesity with BMI of 40.0-44.9, adult [facility] [E66.01, Z68.41]    Traumatic perinephric hematoma, left, initial encounter [S37.092A]    Aortocoronary bypass status [Z95.1]    Left renal artery stenosis ([facility]) [I70.1]    Chronic systolic heart failure ([facility]) [I50.22]    COPD (chronic obstructive pulmonary disease) ([facility] [J44.9]    Hypokalemia [E87.6]    Stage 3 chronic kidney disease (HCC) [N18.3]    Hyponatremia [E87.1]    GERD (gastroesophageal reflux disease) [K21.9]    PAD (peripheral artery disease) ([facility]) [I73.9]    Type 2 diabetes mellitus with diabetic polyneuropathy, with long-term current use of insulin ([facility]) [E11.42, Z79.4]    CLINT (obstructive sleep apnea) [G47.33]    Tobacco abuse disorder [Z72.0]    Essential hypertension [I10]       Perinephric hematoma - noted after renal angiogram on 4 August. Vascular surgery consulted and appreciated, s/p coil embolization of left subsegmental renal artery and CVC placement. ASA/Plavix resumed per vascular. Bancroft Cull for d/c per vascular with outpatient f/u in  2 weeks.       SHAUNNA on CKD stage III - likely due ATN, contrast induced nephropathy from recent angiogram.  Nephrology assisting. Will continue to follow serial labs. Reviewed and documented as above.      Chronic systolic CHF - EF 60%. Diuretics on held due to initial hypotension. BP stable. Plan to resume when okay with Nephrology. .     Acute on chronic anemia - likely due to acute blood loss from hematoma. S/P 2 PRBCs. Follow serial labs. Reviewed and documented as above.     COPD - w/out chronic respiratory failure on no baseline home O2. Controlled on home medication regimen - continued. GERD - w/out active signs/sxs of dysphagia/odynophagia. No evidence of active PUD or hx of GI bleed. Controlled on home PPI - continue.     Leukocytosis - likely due to postsurgical stress response. Pt afebrile with no overt signs of infection.   On empiric antibiotics per critical care - now d/c'd     DMII with hypoglycemia:

## 2020-08-10 NOTE — ADT AUTH CERT
Utilization Reviews         Anemia, Iron Deficiency or Unspecified - Care Day 7 (8/10/2020) by Sheldon Coleman RN         Review Status  Review Entered    Completed  8/10/2020 10:34        Criteria Review       Care Day: 7 Care Date: 8/10/2020 Level of Care:    Guideline Day 2    Level Of Care    (X) Floor to discharge    8/10/2020 10:34 AM EDT by Carlos Mcnamara      [facility]    Clinical Status    (X) * Hemodynamic stability    8/10/2020 10:34 AM EDT by Carlos Mcnamara      hr 86-97  bp: 115/78    (X) * Mental status at baseline    (X) * Active blood loss absent    ( ) * Signs and symptoms of anemia absent or improved    (X) * Hgb/Hct level stable and acceptable for next level of care    ( ) * Etiology of anemia requiring inpatient care absent    ( ) * Discharge plans and education understood    Activity    ( ) * Ambulatory [G]    Routes    ( ) * Oral hydration, medications, and diet    Interventions    (X) Hgb/Hct    * Milestone    Additional Notes    8/10  Day 7       Pt remains on medsurg unit       Vitals: t: 36.7 p: 86 rr: 18 bp: 114/72 spo2: 98% ra       Abn labs:  hgb: 8.3, inr: 1.31, na: 123, bun: 53, cr: 1.9       Orders:    Rocephin 1g iv daily    Accu checks ac/hs with ssi coverage       Per vascular PN:       I/P:    Hemoglobin stable this am    Cr 1.7 which is near hospital baseline (defer to nephrology)       Per Nephrology PN: IMPRESSION/RECOMMENDATIONS:      1. SHAUNNA. Not oliguric.    - Etiology: ATN (hypotension; contrast dye)    - Evaluate - Serial serum creat values; monitor UOP.             - Creat stable vs post-procedure creat; not oliguric    - Plan: Maintain SBP; avoid nephrotoxic meds.         2. Perinephric hematoma. - Complication of L renal artery angioplasty / stent placement    - S/P coil embolization of subsegmental L renal artery         3.  Renovasc HTN.    - S/P L renal artery angioplasty / stent placement    - Follow for late recurrence of HTN (post-perinephric bleed)      4. Anemia - blood loss. - Hgb 7.9 - was 9.3 post-embolization    - S/P transfusion 2 units PRBC's    - Oral Fe added         5. Hyponatremia    - Na 124    - Will administer another dose of Tolvaptan         6. Hypokalemia.    - Received KCl supplement    - K nl       Per Nephrology PN: IMPRESSION/RECOMMENDATIONS:      1. SHAUNNA. Not oliguric.    - Etiology: ATN (hypotension; contrast dye)    - Evaluate - Serial serum creat values; monitor UOP.             - Creat 1.4 --> 1.7 --> 1.8--> 2.0 --> 1.9                - + ml last 24 hrs    - Plan: Maintain SBP; avoid nephrotoxic meds. Will give IV Lasix x 1 (edema).         2. Perinephric hematoma. - Complication of L renal artery angioplasty / stent placement    - S/P coil embolization of subsegmental L renal artery         3. Renovasc HTN.    - S/P L renal artery angioplasty / stent placement    - BP well-controlled    - On scheduled midodrine    - Follow for late recurrence of HTN (post-perinephric bleed) (I.e, Page kidney).         4. Anemia - blood loss. - Hgb 8.3 (stable vs 8/9) was 9.3 post-embolization    - S/P transfusion 2 units PRBC's    - Oral Fe added         5. Hyponatremia    - Has been present consistently since 4/20 (though more severe now)                - ? Etiology - likely multifactorial with pain, volume excess / CHF     and SHAUNNA causing acute changes                - CHF and ? lamictal may be cause of chronic hyponatremia    - Na as low as 120 on 8/9; 123 today    - Rec'd Tolvaptan on 8/7, 8/8 and 8/9    - Check urine sodium and urine osm and serum uric acid    - Restrict fluid intake and give IV Lasix x 1         6.  Hypokalemia.    - Received KCl supplement    - K nl             Anemia, Iron Deficiency or Unspecified - Care Day 6 (8/9/2020) by Bibi Mabry RN         Review Status  Review Entered    Completed  8/10/2020 10:34        Criteria Review       Care Day: 6 Care Date: 8/9/2020 Level of Care:    Guideline Day 2    Level Of Care    (X) Floor to discharge    8/10/2020 10:34 AM EDT by Roula Lucero      [facility]    Clinical Status    (X) * Hemodynamic stability    8/10/2020 10:34 AM EDT by Roula Lucero      hr 86-98  bp: 99/65    (X) * Mental status at baseline    (X) * Active blood loss absent    ( ) * Signs and symptoms of anemia absent or improved    (X) * Hgb/Hct level stable and acceptable for next level of care    8/10/2020 10:34 AM EDT by Roula Lucero      hgb: 8.2    ( ) * Etiology of anemia requiring inpatient care absent    ( ) * Discharge plans and education understood    Activity    ( ) * Ambulatory [G]    Routes    ( ) * Oral hydration, medications, and diet    Interventions    (X) Hgb/Hct    * Milestone    Additional Notes    8/9  Day 6       Pt remains on medsurg unit       Vitals: t: 37.1 p: 90 rr: 17 bp: 110/72 spo2: 97% ra       Abn labs:  hgb: 8.2, inr: 1.8, na: 120, bun: 51, cr: 1.8       Orders:    Rocephin 1g iv daily    Accu checks ac/hs with ssi coverage       Per nephrology PN: IMPRESSION/RECOMMENDATIONS:      1. SHAUNNA. Not oliguric.    - Etiology: ATN (hypotension; contrast dye)    - Evaluate - Serial serum creat values; monitor UOP.             - Creat stable vs post-procedure creat; not oliguric    - Plan: Maintain SBP; avoid nephrotoxic meds.         2. Perinephric hematoma. - Complication of L renal artery angioplasty / stent placement    - S/P coil embolization of subsegmental L renal artery         3. Renovasc HTN.    - S/P L renal artery angioplasty / stent placement    - Follow for late recurrence of HTN (post-perinephric bleed)         4. Anemia - blood loss. - Hgb 7.9 - was 9.3 post-embolization    - S/P transfusion 2 units PRBC's    - Oral Fe added         5. Hyponatremia    - Na 120    - Will administer an increased dose of Tolvaptan         6.  Hypokalemia.    - Received KCl supplement    - K nl         Per IM PN:    Assessment/Plan:    [address]

## 2020-08-10 NOTE — PROGRESS NOTES
Physical Therapy  Facility/Department: [place] [address] [facility]  Daily Treatment Note and Discharge Summary  NAME: Perry Rocha  : 1965  MRN: [id number]    Date of Service: 8/10/2020    Discharge Recommendations:  Home with assist PRN   PT Equipment Recommendations  Equipment Needed: No    Assessment   Body structures, Functions, Activity limitations: Decreased endurance  Assessment: Pt seen for treatment. Pt is IND with all observed mobility and has no concerns with functional mobility upon d/c to home. Pt demos mild SOB and reports she is at baseline function. Pt educated on ambulation program to increase functional mobility at home. Pt verbalized understanding. Pt has no further acute PT needs and will be discharged from caseload. Pt has met all acute PT goals. Recommend home with PRN assist at d/c. Treatment Diagnosis: decreased endurance and balance  PT Education: Goals;Gait Training;PT Role;Disease Specific Education;Plan of Care;Home Exercise Program  Patient Education: Pt verbalized understanding; diseasse specific ed included energy conservation with mobility and ambulation program: 1x/hour every waking hour for 3-5 minutes  Barriers to Learning: no  REQUIRES PT FOLLOW UP: Yes  Activity Tolerance  Activity Tolerance: Patient Tolerated treatment well  Activity Tolerance: Pt with SOB during and after amb     Patient Diagnosis(es): The primary encounter diagnosis was Traumatic perinephric hematoma, left, initial encounter. Diagnoses of Hypokalemia, Stage III chronic kidney disease ([address] Left renal artery stenosis ([address] and Hyponatremia were also pertinent to this visit.      has a past medical history of Acute kidney injury ([address] Acute on chronic congestive heart failure ([address] Alcohol dependence ([address] Bipolar 1 disorder ([address] CAD (coronary artery disease), Cardiomyopathy ([address] Cellulitis, CHF (congestive heart failure) ([address] COPD (chronic obstructive pulmonary disease) ([address] Diabetic ulcer of left foot associated with type 2 diabetes mellitus ([facility] Diabetic ulcer of toe of right foot associated with type 2 diabetes mellitus ([facility] GERD (gastroesophageal reflux disease), High cholesterol, HTN (hypertension), Kidney disease, chronic, stage II (mild, EGFR 60+ ml/min), MI (myocardial infarction) ([facility] Positive FIT (fecal immunochemical test), Pulmonary nodule, and PVD (peripheral vascular disease) ([facility] has a past surgical history that includes Tonsillectomy; Cholecystectomy; tumor excision; Upper gastrointestinal endoscopy (4/25/2013); Upper gastrointestinal endoscopy (12/10/2015); Upper gastrointestinal endoscopy (01/06/2017); Arterial bypass surgry (Left, 01/06/2016); Cardiac catheterization (03/27/2018); Coronary angioplasty with stent (03/16/2018); Rotator cuff repair (Left, 04/22/2016); Coronary angioplasty with stent (01/03/2018); Insertable Cardiac Monitor (02/14/2019); femoral bypass (Right, 5/16/2019); transluminal angioplasty (Left, 10/08/2019); Cardiac catheterization (01/20/2020); Coronary artery bypass graft (N/A, 1/27/2020); vascular surgery (Left, 12/08/2015); transluminal angioplasty (Left, 04/29/2020); vascular surgery (Bilateral, 07/07/2020); Coronary angioplasty with stent (10/07/2019); and transesophageal echocardiogram (01/26/2020). Restrictions  Restrictions/Precautions  Restrictions/Precautions: Up as Tolerated, General Precautions  Position Activity Restriction  Other position/activity restrictions: activity as tolerated  Subjective   General  Chart Reviewed: Yes  Response To Previous Treatment: Patient with no complaints from previous session. Family / Caregiver Present: No  Referring Practitioner: MD Shahriar  Subjective  Subjective: Pt agreeable to PT  General Comment  Comments: Pt sitting EOB upon entry, RN cleared pt for therapy. RN states she is a \"selfer\" in the room.   Pain Screening  Patient Currently in Pain: No  Vital Signs  Patient Currently in Pain: No Minutes 19         Timed Code Treatment Minutes: 29 Jessica Quiñones, PT

## 2020-08-10 NOTE — PROGRESS NOTES
Vascular Surgery Progress Note      SUBJECTIVE:  No c/o. Wants to go home    OBJECTIVE    Physical  CURRENT VITALS:  /72   Pulse 86   Temp 98 °F (36.7 °C)   Resp 18   Ht 5' 4.5\" (1.638 m)   Wt 254 lb 8 oz (115.4 kg)   LMP 10/24/2012   SpO2 98%   BMI 43.01 kg/m²   24 HR INTAKE/OUTPUT:    Intake/Output Summary (Last 24 hours) at 8/10/2020 0732  Last data filed at 8/9/2020 1959  Gross per 24 hour   Intake --   Output 800 ml   Net -800 ml     Abd soft  R groin access site with hematoma or bleeding    Data  CBC:   Lab Results   Component Value Date    WBC 9.1 08/10/2020    RBC 3.22 08/10/2020    HGB 8.3 08/10/2020    HCT 25.9 08/10/2020    MCV 80.2 08/10/2020    MCH 25.6 08/10/2020    MCHC 31.9 08/10/2020    RDW 21.9 08/10/2020     08/10/2020    MPV 8.3 08/10/2020     BMP:    Lab Results   Component Value Date     08/10/2020    K 4.3 08/10/2020    K 3.5 08/05/2020    CL 82 08/10/2020    CO2 27 08/10/2020    BUN 53 08/10/2020    LABALBU 3.6 08/10/2020    CREATININE 1.9 08/10/2020    CALCIUM 9.0 08/10/2020    GFRAA 33 08/10/2020    LABGLOM 27 08/10/2020    GLUCOSE 88 08/10/2020       ASSESSMENT AND PLAN    S/P Renal stent complicated by post procedure perinephric hematoma. S/P Coiling branch vessel. Hgb has been stable. Creat 1.9- peaked at 2, Contrast exposure, hypotension likely contributed, expect further decrease. Hyponatremia- improved. Discharge when ok with Nephrology. Should go home on ASA, Plavix, and Iron.

## 2020-08-10 NOTE — PROGRESS NOTES
Progress Note          SUBJECTIVE:    We are following this patient for SHAUNNA; renovascular HTN. The patient had had elective renal arteriogram on 8/4 and was found to have significant L renal artery stenosis and she underwent successful stenting and angioplasty of the L renal artery. However, she returned several hours later due to acute onset of L flank pain. CT showed large subcapsular and perinephric hematoma. She had angiography with placement of coil in L renal artery subsegmental vessel on 8/5. Creat 1.2 on admit; 1.5 post-procedure. The patient was seen and examined; creat 1.9 - was 2.0 last pm. Not oliguric. Serum Na 123. ROS: Notes decreasing L flank pain. No N/V. Social: No family at bedside. Physical Exam:    Weight: 254 lb 8 oz (115.4 kg), BP: 115/78       Constitutional:  Obese, mid-aged female. She is in no distress. Respiratory: Clear. Cardiovascular/Edema:  Reg rhythm; 2+ edema. Gastrointestinal: Obese; BS present. Mild L flank tenderness. Neurologic: No gross focal neurologic findings. DATA:    Lab Results   Component Value Date    CREATININE 1.9 (H) 08/10/2020    BUN 53 (H) 08/10/2020     (L) 08/10/2020    K 4.3 08/10/2020    CL 82 (L) 08/10/2020    CO2 27 08/10/2020     Lab Results   Component Value Date    WBC 9.1 08/10/2020    HGB 8.3 (L) 08/10/2020    HCT 25.9 (L) 08/10/2020    MCV 80.2 08/10/2020     08/10/2020     IMPRESSION/RECOMMENDATIONS:    1. SHAUNNA. Not oliguric.  - Etiology: ATN (hypotension; contrast dye)   - Evaluate - Serial serum creat values; monitor UOP. - Creat 1.4 --> 1.7 --> 1.8--> 2.0 --> 1.9   - + ml last 24 hrs  - Plan: Maintain SBP; avoid nephrotoxic meds. Will give IV Lasix x 1 (edema).     2. Perinephric hematoma. - Complication of L renal artery angioplasty / stent placement  - S/P coil embolization of subsegmental L renal artery     3.  Renovasc HTN.  - S/P L renal artery angioplasty / stent placement  - BP well-controlled  - On scheduled midodrine  - Follow for late recurrence of HTN (post-perinephric bleed) (I.e, Page kidney).     4. Anemia - blood loss. - Hgb 8.3 (stable vs 8/9) was 9.3 post-embolization  - S/P transfusion 2 units PRBC's  - Oral Fe added     5. Hyponatremia  - Has been present consistently since 4/20 (though more severe now)   - ? Etiology - likely multifactorial with pain, volume excess / CHF  and SHAUNNA causing acute changes   - CHF and ? lamictal may be cause of chronic hyponatremia  - Na as low as 120 on 8/9; 123 today  - Rec'd Tolvaptan on 8/7, 8/8 and 8/9  - Check urine sodium and urine osm and serum uric acid  - Restrict fluid intake and give IV Lasix x 1     6.  Hypokalemia.  - Received KCl supplement  - K nl    Would prefer to see serum Na trending higher befor D/C

## 2020-08-10 NOTE — PROGRESS NOTES
Bedside report received from [facility]. Patient resting comfortably in bed. No signs of discomfort or distress. Bed is in lowest position, wheels locked, 2/2 side rails up. Bedside table and call light within reach. White board updated. Will continue to monitor patient. Jeffery Solorio RN    7:52 PM  VSS. No needs at this time. Jeffery Solorio RN    8:59 PM  Shift assessment complete. (See findings in flowsheet). Med pass complete. (See MAR). VSS. Patient with no complaints at this time. Patient resting in bed comfortably. No signs or symptoms of distress or discomfort noted at this time. Bed in lowest position, brakes locked. Nonskid footwear in place. 5 P's addressed, no needs at this time. Pt calls out appropriately. Will continue to monitor. Jeffery Solorio RN    11:53 PM  Blood drawn per orders and sent to lab via tube system. Jeffery Solorio RN    12:23 AM  PRN oxy given for pain.  Jeffery Solorio RN

## 2020-08-10 NOTE — CARE COORDINATION
Spoke with patient at bedside as patient is new to this CM. She lives at home with her friend and her daughter. She states she will not have any discharge needs. She states her daughter is a certified nursing aid and she will help her with whatever kind of assistance she might need.

## 2020-08-11 LAB
A/G RATIO: 0.9 (ref 1.1–2.2)
A/G RATIO: 1 (ref 1.1–2.2)
A/G RATIO: 1 (ref 1.1–2.2)
ALBUMIN SERPL-MCNC: 3.5 G/DL (ref 3.4–5)
ALBUMIN SERPL-MCNC: 3.7 G/DL (ref 3.4–5)
ALBUMIN SERPL-MCNC: 3.8 G/DL (ref 3.4–5)
ALP BLD-CCNC: 316 U/L (ref 40–129)
ALP BLD-CCNC: 318 U/L (ref 40–129)
ALP BLD-CCNC: 322 U/L (ref 40–129)
ALT SERPL-CCNC: 25 U/L (ref 10–40)
ALT SERPL-CCNC: 25 U/L (ref 10–40)
ALT SERPL-CCNC: 26 U/L (ref 10–40)
ANION GAP SERPL CALCULATED.3IONS-SCNC: 14 MMOL/L (ref 3–16)
ANION GAP SERPL CALCULATED.3IONS-SCNC: 14 MMOL/L (ref 3–16)
ANION GAP SERPL CALCULATED.3IONS-SCNC: 15 MMOL/L (ref 3–16)
AST SERPL-CCNC: 37 U/L (ref 15–37)
AST SERPL-CCNC: 39 U/L (ref 15–37)
AST SERPL-CCNC: 39 U/L (ref 15–37)
BASOPHILS ABSOLUTE: 0.1 K/UL (ref 0–0.2)
BASOPHILS RELATIVE PERCENT: 1.1 %
BILIRUB SERPL-MCNC: 2.7 MG/DL (ref 0–1)
BUN BLDV-MCNC: 52 MG/DL (ref 7–20)
BUN BLDV-MCNC: 53 MG/DL (ref 7–20)
BUN BLDV-MCNC: 56 MG/DL (ref 7–20)
CALCIUM SERPL-MCNC: 8.9 MG/DL (ref 8.3–10.6)
CALCIUM SERPL-MCNC: 9.1 MG/DL (ref 8.3–10.6)
CALCIUM SERPL-MCNC: 9.2 MG/DL (ref 8.3–10.6)
CHLORIDE BLD-SCNC: 83 MMOL/L (ref 99–110)
CHLORIDE BLD-SCNC: 83 MMOL/L (ref 99–110)
CHLORIDE BLD-SCNC: 84 MMOL/L (ref 99–110)
CO2: 27 MMOL/L (ref 21–32)
CO2: 27 MMOL/L (ref 21–32)
CO2: 28 MMOL/L (ref 21–32)
CREAT SERPL-MCNC: 1.8 MG/DL (ref 0.6–1.1)
CREAT SERPL-MCNC: 1.8 MG/DL (ref 0.6–1.1)
CREAT SERPL-MCNC: 1.9 MG/DL (ref 0.6–1.1)
EOSINOPHILS ABSOLUTE: 0.1 K/UL (ref 0–0.6)
EOSINOPHILS RELATIVE PERCENT: 1 %
GFR AFRICAN AMERICAN: 33
GFR AFRICAN AMERICAN: 35
GFR AFRICAN AMERICAN: 35
GFR NON-AFRICAN AMERICAN: 27
GFR NON-AFRICAN AMERICAN: 29
GFR NON-AFRICAN AMERICAN: 29
GLOBULIN: 3.7 G/DL
GLUCOSE BLD-MCNC: 145 MG/DL (ref 70–99)
GLUCOSE BLD-MCNC: 153 MG/DL (ref 70–99)
GLUCOSE BLD-MCNC: 158 MG/DL (ref 70–99)
GLUCOSE BLD-MCNC: 167 MG/DL (ref 70–99)
GLUCOSE BLD-MCNC: 169 MG/DL (ref 70–99)
GLUCOSE BLD-MCNC: 188 MG/DL (ref 70–99)
GLUCOSE BLD-MCNC: 199 MG/DL (ref 70–99)
HCT VFR BLD CALC: 26.5 % (ref 36–48)
HEMOGLOBIN: 8.4 G/DL (ref 12–16)
INR BLD: 1.27 (ref 0.86–1.14)
LYMPHOCYTES ABSOLUTE: 0.5 K/UL (ref 1–5.1)
LYMPHOCYTES RELATIVE PERCENT: 4.9 %
MCH RBC QN AUTO: 25.5 PG (ref 26–34)
MCHC RBC AUTO-ENTMCNC: 31.6 G/DL (ref 31–36)
MCV RBC AUTO: 80.6 FL (ref 80–100)
MONOCYTES ABSOLUTE: 0.9 K/UL (ref 0–1.3)
MONOCYTES RELATIVE PERCENT: 8.6 %
NEUTROPHILS ABSOLUTE: 8.4 K/UL (ref 1.7–7.7)
NEUTROPHILS RELATIVE PERCENT: 84.4 %
OSMOLALITY URINE: 233 MOSM/KG (ref 390–1070)
PDW BLD-RTO: 22.7 % (ref 12.4–15.4)
PERFORMED ON: ABNORMAL
PLATELET # BLD: 375 K/UL (ref 135–450)
PMV BLD AUTO: 8.4 FL (ref 5–10.5)
POTASSIUM SERPL-SCNC: 3.3 MMOL/L (ref 3.5–5.1)
POTASSIUM SERPL-SCNC: 3.8 MMOL/L (ref 3.5–5.1)
POTASSIUM SERPL-SCNC: 3.9 MMOL/L (ref 3.5–5.1)
PROTHROMBIN TIME: 14.8 SEC (ref 10–13.2)
RBC # BLD: 3.29 M/UL (ref 4–5.2)
SODIUM BLD-SCNC: 124 MMOL/L (ref 136–145)
SODIUM BLD-SCNC: 125 MMOL/L (ref 136–145)
SODIUM BLD-SCNC: 126 MMOL/L (ref 136–145)
TOTAL PROTEIN: 7.2 G/DL (ref 6.4–8.2)
TOTAL PROTEIN: 7.4 G/DL (ref 6.4–8.2)
TOTAL PROTEIN: 7.5 G/DL (ref 6.4–8.2)
WBC # BLD: 9.9 K/UL (ref 4–11)

## 2020-08-11 PROCEDURE — 85025 COMPLETE CBC W/AUTO DIFF WBC: CPT

## 2020-08-11 PROCEDURE — 6370000000 HC RX 637 (ALT 250 FOR IP): Performed by: INTERNAL MEDICINE

## 2020-08-11 PROCEDURE — 2580000003 HC RX 258: Performed by: INTERNAL MEDICINE

## 2020-08-11 PROCEDURE — 6360000002 HC RX W HCPCS: Performed by: INTERNAL MEDICINE

## 2020-08-11 PROCEDURE — 85610 PROTHROMBIN TIME: CPT

## 2020-08-11 PROCEDURE — 2060000000 HC ICU INTERMEDIATE R&B

## 2020-08-11 PROCEDURE — 94640 AIRWAY INHALATION TREATMENT: CPT

## 2020-08-11 PROCEDURE — 80053 COMPREHEN METABOLIC PANEL: CPT

## 2020-08-11 PROCEDURE — 6370000000 HC RX 637 (ALT 250 FOR IP): Performed by: SURGERY

## 2020-08-11 RX ORDER — SPIRONOLACTONE 25 MG/1
50 TABLET ORAL DAILY
Status: DISCONTINUED | OUTPATIENT
Start: 2020-08-11 | End: 2020-08-12 | Stop reason: HOSPADM

## 2020-08-11 RX ORDER — FUROSEMIDE 10 MG/ML
80 INJECTION INTRAMUSCULAR; INTRAVENOUS 2 TIMES DAILY
Status: COMPLETED | OUTPATIENT
Start: 2020-08-11 | End: 2020-08-11

## 2020-08-11 RX ORDER — MIDODRINE HYDROCHLORIDE 5 MG/1
5 TABLET ORAL 3 TIMES DAILY
Status: DISCONTINUED | OUTPATIENT
Start: 2020-08-11 | End: 2020-08-12

## 2020-08-11 RX ORDER — POTASSIUM CHLORIDE 20 MEQ/1
40 TABLET, EXTENDED RELEASE ORAL ONCE
Status: COMPLETED | OUTPATIENT
Start: 2020-08-11 | End: 2020-08-11

## 2020-08-11 RX ADMIN — CLOPIDOGREL BISULFATE 75 MG: 75 TABLET ORAL at 09:17

## 2020-08-11 RX ADMIN — INSULIN LISPRO 1 UNITS: 100 INJECTION, SOLUTION INTRAVENOUS; SUBCUTANEOUS at 20:36

## 2020-08-11 RX ADMIN — BUSPIRONE HYDROCHLORIDE 30 MG: 5 TABLET ORAL at 09:17

## 2020-08-11 RX ADMIN — MIDODRINE HYDROCHLORIDE 5 MG: 5 TABLET ORAL at 12:02

## 2020-08-11 RX ADMIN — OXYCODONE HYDROCHLORIDE 10 MG: 5 TABLET ORAL at 12:02

## 2020-08-11 RX ADMIN — INSULIN LISPRO 1 UNITS: 100 INJECTION, SOLUTION INTRAVENOUS; SUBCUTANEOUS at 09:24

## 2020-08-11 RX ADMIN — FUROSEMIDE 80 MG: 10 INJECTION, SOLUTION INTRAMUSCULAR; INTRAVENOUS at 09:18

## 2020-08-11 RX ADMIN — PANTOPRAZOLE SODIUM 40 MG: 40 TABLET, DELAYED RELEASE ORAL at 09:18

## 2020-08-11 RX ADMIN — ASPIRIN 81 MG: 81 TABLET ORAL at 09:18

## 2020-08-11 RX ADMIN — ATORVASTATIN CALCIUM 80 MG: 80 TABLET, FILM COATED ORAL at 20:34

## 2020-08-11 RX ADMIN — TIOTROPIUM BROMIDE INHALATION SPRAY 2 PUFF: 3.12 SPRAY, METERED RESPIRATORY (INHALATION) at 07:34

## 2020-08-11 RX ADMIN — SPIRONOLACTONE 50 MG: 25 TABLET ORAL at 09:17

## 2020-08-11 RX ADMIN — OXYCODONE HYDROCHLORIDE 10 MG: 5 TABLET ORAL at 00:23

## 2020-08-11 RX ADMIN — BENZTROPINE MESYLATE 1 MG: 1 TABLET ORAL at 20:34

## 2020-08-11 RX ADMIN — LAMOTRIGINE 200 MG: 100 TABLET ORAL at 20:34

## 2020-08-11 RX ADMIN — Medication: at 09:25

## 2020-08-11 RX ADMIN — DOCUSATE SODIUM 200 MG: 100 CAPSULE, LIQUID FILLED ORAL at 20:34

## 2020-08-11 RX ADMIN — INSULIN LISPRO 1 UNITS: 100 INJECTION, SOLUTION INTRAVENOUS; SUBCUTANEOUS at 12:04

## 2020-08-11 RX ADMIN — INSULIN GLARGINE 10 UNITS: 100 INJECTION, SOLUTION SUBCUTANEOUS at 20:36

## 2020-08-11 RX ADMIN — FUROSEMIDE 80 MG: 10 INJECTION, SOLUTION INTRAMUSCULAR; INTRAVENOUS at 18:36

## 2020-08-11 RX ADMIN — OXYCODONE HYDROCHLORIDE 10 MG: 5 TABLET ORAL at 20:34

## 2020-08-11 RX ADMIN — INSULIN LISPRO 1 UNITS: 100 INJECTION, SOLUTION INTRAVENOUS; SUBCUTANEOUS at 18:39

## 2020-08-11 RX ADMIN — LAMOTRIGINE 200 MG: 100 TABLET ORAL at 09:18

## 2020-08-11 RX ADMIN — BUSPIRONE HYDROCHLORIDE 30 MG: 5 TABLET ORAL at 22:18

## 2020-08-11 RX ADMIN — ARIPIPRAZOLE 10 MG: 10 TABLET ORAL at 09:18

## 2020-08-11 RX ADMIN — PANTOPRAZOLE SODIUM 40 MG: 40 TABLET, DELAYED RELEASE ORAL at 18:36

## 2020-08-11 RX ADMIN — FERROUS SULFATE TAB 325 MG (65 MG ELEMENTAL FE) 325 MG: 325 (65 FE) TAB at 18:37

## 2020-08-11 RX ADMIN — INSULIN LISPRO 3 UNITS: 100 INJECTION, SOLUTION INTRAVENOUS; SUBCUTANEOUS at 09:24

## 2020-08-11 RX ADMIN — POTASSIUM CHLORIDE 40 MEQ: 20 TABLET, EXTENDED RELEASE ORAL at 09:17

## 2020-08-11 RX ADMIN — INSULIN LISPRO 3 UNITS: 100 INJECTION, SOLUTION INTRAVENOUS; SUBCUTANEOUS at 12:04

## 2020-08-11 RX ADMIN — MIDODRINE HYDROCHLORIDE 5 MG: 5 TABLET ORAL at 18:36

## 2020-08-11 RX ADMIN — Medication 10 ML: at 18:36

## 2020-08-11 RX ADMIN — DOCUSATE SODIUM 200 MG: 100 CAPSULE, LIQUID FILLED ORAL at 09:17

## 2020-08-11 RX ADMIN — FERROUS SULFATE TAB 325 MG (65 MG ELEMENTAL FE) 325 MG: 325 (65 FE) TAB at 09:18

## 2020-08-11 RX ADMIN — INSULIN LISPRO 3 UNITS: 100 INJECTION, SOLUTION INTRAVENOUS; SUBCUTANEOUS at 18:40

## 2020-08-11 RX ADMIN — ONDANSETRON 4 MG: 2 INJECTION INTRAMUSCULAR; INTRAVENOUS at 20:50

## 2020-08-11 ASSESSMENT — PAIN SCALES - GENERAL
PAINLEVEL_OUTOF10: 0
PAINLEVEL_OUTOF10: 7
PAINLEVEL_OUTOF10: 8
PAINLEVEL_OUTOF10: 8
PAINLEVEL_OUTOF10: 5

## 2020-08-11 NOTE — PLAN OF CARE
Problem: Falls - Risk of:  Goal: Will remain free from falls  Description: Will remain free from falls  Outcome: Ongoing  Goal: Absence of physical injury  Description: Absence of physical injury  Outcome: Ongoing     Problem: Pain:  Description: Pain management should include both nonpharmacologic and pharmacologic interventions.   Goal: Pain level will decrease  Description: Pain level will decrease  Outcome: Ongoing  Goal: Control of acute pain  Description: Control of acute pain  Outcome: Ongoing  Goal: Control of chronic pain  Description: Control of chronic pain  Outcome: Ongoing     Problem: Bleeding:  Goal: Will show no signs and symptoms of excessive bleeding  Description: Will show no signs and symptoms of excessive bleeding  Outcome: Ongoing     Problem: Skin Integrity:  Goal: Will show no infection signs and symptoms  Description: Will show no infection signs and symptoms  Outcome: Ongoing  Goal: Absence of new skin breakdown  Description: Absence of new skin breakdown  Outcome: Ongoing

## 2020-08-11 NOTE — PROGRESS NOTES
Hospitalist Progress Note      PCP: Nicole العراقي PA-C    Date of Admission: 8/4/2020    Chief Complaint: L Flank Pain    Subjective: no new c/o. Medications:  Reviewed    Infusion Medications    dextrose       Scheduled Medications    midodrine  5 mg Oral TID    furosemide  80 mg Intravenous BID    spironolactone  50 mg Oral Daily    insulin glargine  10 Units Subcutaneous Nightly    insulin lispro  0-6 Units Subcutaneous TID WC    insulin lispro  0-3 Units Subcutaneous Nightly    insulin lispro  3 Units Subcutaneous TID WC    docusate sodium  200 mg Oral BID    polycarbophil  625 mg Oral Nightly    ferrous sulfate  325 mg Oral BID WC    zinc oxide   Topical Daily    aspirin EC  81 mg Oral Daily    atorvastatin  80 mg Oral Nightly    ARIPiprazole  10 mg Oral Daily    benztropine  1 mg Oral Nightly    busPIRone  30 mg Oral BID    clopidogrel  75 mg Oral Daily    lamoTRIgine  200 mg Oral BID    pantoprazole  40 mg Oral BID    tiotropium  2 puff Inhalation Daily     PRN Meds: ondansetron, oxyCODONE **OR** oxyCODONE, HYDROmorphone, acetaminophen, diphenhydrAMINE, diphenhydrAMINE, traZODone, sodium chloride flush, glucose, dextrose, glucagon (rDNA), dextrose      Intake/Output Summary (Last 24 hours) at 8/11/2020 1003  Last data filed at 8/11/2020 0515  Gross per 24 hour   Intake 1810 ml   Output 3750 ml   Net -1940 ml       Physical Exam Performed:    /77   Pulse 90   Temp 97.5 °F (36.4 °C)   Resp 24   Ht 5' 4.5\" (1.638 m)   Wt 253 lb 11.2 oz (115.1 kg)   LMP 10/24/2012   SpO2 97%   BMI 42.87 kg/m²     General appearance: No apparent distress, appears stated age and cooperative. HEENT: Pupils equal, round, and reactive to light. Conjunctivae/corneas clear. Neck: Supple, with full range of motion. No jugular venous distention. Trachea midline. Respiratory:  Normal respiratory effort. Clear to auscultation, bilaterally without Rales/Wheezes/Rhonchi.   Cardiovascular: Regular rate and rhythm with normal S1/S2 without murmurs, rubs or gallops. Abdomen: Soft, non-tender, non-distended with normal bowel sounds. Musculoskeletal: No clubbing, cyanosis or edema bilaterally. Full range of motion without deformity. Skin: Skin color, texture, turgor normal.  No rashes or lesions. Neurologic:  Neurovascularly intact without any focal sensory/motor deficits. Cranial nerves: II-XII intact, grossly non-focal.  Psychiatric: Alert and oriented, thought content appropriate, normal insight  Capillary Refill: Brisk,< 3 seconds   Peripheral Pulses: +2 palpable, equal bilaterally       Labs:   Recent Labs     08/09/20  0600 08/10/20  0450 08/11/20  0528   WBC 10.2 9.1 9.9   HGB 8.2* 8.3* 8.4*   HCT 25.6* 25.9* 26.5*    378 375     Recent Labs     08/10/20  1600 08/10/20  2354 08/11/20  0900   * 124* 125*   K 3.9 3.3* 3.8   CL 85* 83* 83*   CO2 28 27 28   BUN 51* 56* 53*   CREATININE 1.9* 1.8* 1.8*   CALCIUM 9.3 8.9 9.1     Recent Labs     08/10/20  1600 08/10/20  2354 08/11/20  0900   AST 41* 39* 39*   ALT 25 25 25   BILITOT 2.8* 2.7* 2.7*   ALKPHOS 291* 322* 318*     Recent Labs     08/09/20  0600 08/10/20  0450 08/11/20  0528   INR 1.28* 1.31* 1.27*     No results for input(s): CKTOTAL, TROPONINI in the last 72 hours.     Urinalysis:      Lab Results   Component Value Date    NITRU Negative 08/04/2020    WBCUA None seen 08/04/2020    BACTERIA Rare 09/28/2019    RBCUA 3-4 08/04/2020    BLOODU TRACE-INTACT 08/04/2020    SPECGRAV 1.010 08/04/2020    GLUCOSEU Negative 08/04/2020       Consults:    IP CONSULT TO HOSPITALIST  IP CONSULT TO CRITICAL CARE  IP CONSULT TO NEPHROLOGY  IP CONSULT TO CASE MANAGEMENT      Assessment/Plan:    Active Hospital Problems    Diagnosis    CAD (coronary artery disease) [I25.10]     Priority: High    [facility] superimposed [facility] CKD [facility] [N17.9, N18.9]    Acute blood loss anemia [D62]    DM (diabetes mellitus), secondary uncontrolled ([facility] [E13.65]    Morbid obesity with BMI of 40.0-44.9, adult ([facility]) [E66.01, Z68.41]    Traumatic perinephric hematoma, left, initial encounter [S37.092A]    Aortocoronary bypass status [Z95.1]    Left renal artery stenosis ([facility]) [I70.1]    Chronic systolic heart failure ([facility]) [I50.22]    COPD (chronic obstructive pulmonary disease) ([facility] [J44.9]    Hypokalemia [E87.6]    Stage 3 chronic kidney disease ([facility]) [N18.3]    Hyponatremia [E87.1]    GERD (gastroesophageal reflux disease) [K21.9]    PAD (peripheral artery disease) ([facility]) [I73.9]    Type 2 diabetes mellitus with diabetic polyneuropathy, with long-term current use of insulin ([facility]) [E11.42, Z79.4]    CLINT (obstructive sleep apnea) [G47.33]    Tobacco abuse disorder [Z72.0]    Essential hypertension [I10]       Perinephric hematoma - noted after renal angiogram on 4 August. Vascular surgery consulted and appreciated, s/p coil embolization of left subsegmental renal artery and CVC placement. ASA/Plavix resumed per vascular. [address] for d/c per vascular with outpatient f/u in  2 weeks.       SHAUNNA on CKD stage III - likely due ATN, contrast induced nephropathy from recent angiogram.  Nephrology assisting. Will continue to follow serial labs. Reviewed and documented as above.      Chronic systolic CHF - EF 88%. Diuretics on held due to initial hypotension. BP stable. Plan to resume when okay with Nephrology. .     Acute on chronic anemia - likely due to acute blood loss from hematoma. S/P 2 PRBCs. Follow serial labs. Reviewed and documented as above.     COPD - w/out chronic respiratory failure on no baseline home O2. Controlled on home medication regimen - continued. GERD - w/out active signs/sxs of dysphagia/odynophagia. No evidence of active PUD or hx of GI bleed. Controlled on home PPI - continue.     Leukocytosis - likely due to postsurgical stress response. Pt afebrile with no overt signs of infection.   On empiric antibiotics per critical care - now d/c'd     DM2 - w/ hypoglycemia on admission. Decreased Home Lantus to 10 units QHS and decreased SSI to low dose. Monitor BG and adjust insulin doses as needed. Continue hypoglycemia protocol      HypoNatremia - Nephrology consulted and appreciated. S/P Tolvaptan on 7/8 August.  Will continue to follow serial labs. Reviewed and documented as above.         DVT Prophylaxis: IPC  Diet: DIET GENERAL; Daily Fluid Restriction: 1500 ml  Code Status: Full Code      PT/OT Eval Status: seen w/ recs for home w/ assist.     Isaias Loya - perhaps Wed/Thurs 12/13 August pending increasing/stable Sodium and Nephrology recs.      Alexis Daniel MD

## 2020-08-11 NOTE — PLAN OF CARE
Nutrition Problem #1: Increased nutrient needs  Intervention: Food and/or Nutrient Delivery: Continue Current Diet  Nutritional Goals: Patient will eat 50% or greater of meals and supplements.

## 2020-08-11 NOTE — PROGRESS NOTES
Progress Note          SUBJECTIVE:    We are following this patient for SHAUNNA; renovascular HTN. The patient had had elective renal arteriogram on 8/4 and was found to have significant L renal artery stenosis and she underwent successful stenting and angioplasty of the L renal artery. However, she returned several hours later due to acute onset of L flank pain. CT showed large subcapsular and perinephric hematoma. She had angiography with placement of coil in L renal artery subsegmental vessel on 8/5. Creat 1.2 on admit; 1.5 post-procedure. The patient was seen and examined; creat 1.8 - was 1.9 last pm. UOP 4.8+ L. Na 124. Wt down 1 lb (still up 8 lbs vs admission). ROS: Notes decreasing L flank pain. No chest pain or dyspnea at rest.    Social: No family at bedside. Physical Exam:    Weight: 253 lb 11.2 oz (115.1 kg), BP: 112/76       Constitutional:  Obese, mid-aged female. She is in no distress. Respiratory: Clear. Cardiovascular/Edema:  Reg rhythm; 2+ LE edema. Gastrointestinal: Obese; BS present. Mild L flank tenderness. Neurologic: No gross focal neurologic findings. DATA:    Lab Results   Component Value Date    CREATININE 1.8 (H) 08/10/2020    BUN 56 (H) 08/10/2020     (L) 08/10/2020    K 3.3 (L) 08/10/2020    CL 83 (L) 08/10/2020    CO2 27 08/10/2020     Lab Results   Component Value Date    WBC 9.9 08/11/2020    HGB 8.4 (L) 08/11/2020    HCT 26.5 (L) 08/11/2020    MCV 80.6 08/11/2020     08/11/2020     IMPRESSION/RECOMMENDATIONS:    1. SHAUNNA. Not oliguric.  - Etiology: ATN (hypotension; contrast dye)   - Evaluate - Serial serum creat values; monitor UOP. - Creat 1.4 --> 1.7 --> 1.8--> 2.0 --> 1.9 --> 1.8   - UOP  4850+ ml last 24 hrs  - Plan: Maintain SBP; avoid nephrotoxic meds. Will give additional IV Lasix (edema).     2. Perinephric hematoma.   - Complication of L renal artery angioplasty / stent placement  - S/P coil embolization of subsegmental L renal artery     3. Renovasc HTN.  - S/P L renal artery angioplasty / stent placement  - BP satisfactory  - On scheduled midodrine - decrease dose  - Follow for late recurrence of HTN (post-perinephric bleed) (I.e, Page kidney).     4. Anemia - blood loss. - Hgb 8.4 (stable vs 8/10) was 9.3 post-embolization  - S/P transfusion 2 units PRBC's  - Oral Fe added     5. Hyponatremia  - Has been present consistently since 4/20 (though more severe now)   - ? Etiology - likely multifactorial with pain, volume excess / CHF  and SHAUNNA causing acute changes   - CHF and ? lamictal may be cause of chronic hyponatremia  - Na as low as 120 on 8/9; 124 today (was 123 on 8/9)  - Rec'd Tolvaptan on 8/7, 8/8 and 8/9  - Urine Na < 20; serum uric acid 16.0 - c/w CHF as primary cause of hyponatremia.  - Restrict fluid intake and give additional IV Lasix and add (resume spironolactone)     6. Hypokalemia. - K 3.3   - Give additional KCl supplement    7. Hyperbilirubinemia  - ?  Due to extravascular hemolysis    Would prefer to see serum Na trending higher befor D/C

## 2020-08-11 NOTE — PROGRESS NOTES
Bedside report received from [facility]. Patient resting comfortably in bed. No signs of discomfort or distress. Bed is in lowest position, wheels locked, 2/2 side rails up. Bedside table and call light within reach. White board updated. Will continue to monitor patient. Jaye Gil RN    7:46 PM  VSS. No needs at this time. Jaye Gil RN    11:37 PM  Blood drawn per orders and sent to lab via tube system. Jaye Gil RN    5:47 AM  PRN oxy given for pain.  Jaye Gil RN

## 2020-08-11 NOTE — PROGRESS NOTES
Comprehensive Nutrition Assessment    Type and Reason for Visit:  Initial(LOS)    Nutrition Recommendations/Plan:   1. Oral diet pending MBS today  2. Fluid restriction per nephrology  3. Will monitor nutritional adequacy, nutrition-related labs, weights, BMs, and clinical progress     Nutrition Assessment:  Patient admitted with traumatic left perinephric hematoma status post left renal artery angioplasty and stenting on 8/5/20 eating % meals. Fluid restriction 1500 ml/day; Na 125 mmol/L this am, treated by nephrology. Patient and  resting quietly at this time. MBS today at 1:00 PM.  Will continue to monitor. Malnutrition Assessment:  Malnutrition Status: At risk for malnutrition (Comment)      Estimated Daily Nutrient Needs:  Energy (kcal):  3290-4976; Weight Used for Energy Requirements:  Current(115.1 kg)     Protein (g):  76-87; Weight Used for Protein Requirements:  Ideal(1.3-1.5)        Fluid (ml/day):  restricted at this time; Weight Used for Fluid Requirements:         Nutrition Related Findings:  no BM noted, abd round and distended, Colace ordered bid on 8/9/20; blood sugars less than 200 mg/dl      Wounds:  Multiple, Diabetic Ulcer       Current Nutrition Therapies:    DIET GENERAL; Daily Fluid Restriction: 1500 ml    Anthropometric Measures:  · Height: 5' 4.5\" (163.8 cm)  · Current Body Weight: 253 lb 11 oz (115.1 kg)   · Admission Body Weight: 250 lb 7 oz (113.6 kg)    · Ideal Body Weight: 123 lbs; % Ideal Body Weight     · BMI: 42.9  · BMI Categories: Obese Class 3 (BMI 40.0 or greater)       Nutrition Diagnosis:   · Increased nutrient needs related to increase demand for energy/nutrients as evidenced by wounds    Nutrition Interventions:   Food and/or Nutrient Delivery:  Continue Current Diet  Coordination of Nutrition Care:  Continued Inpatient Monitoring    Goals:  Patient will eat 50% or greater of meals and supplements.        Nutrition Monitoring and Evaluation:

## 2020-08-11 NOTE — PROGRESS NOTES
Pts son brought in arbys for lunch. Educated them on recommendations and poc. Both verbalized understanding and were able to teach back.

## 2020-08-12 VITALS
HEART RATE: 92 BPM | DIASTOLIC BLOOD PRESSURE: 76 MMHG | HEIGHT: 65 IN | SYSTOLIC BLOOD PRESSURE: 126 MMHG | BODY MASS INDEX: 42.5 KG/M2 | OXYGEN SATURATION: 97 % | WEIGHT: 255.1 LBS | RESPIRATION RATE: 20 BRPM | TEMPERATURE: 98.2 F

## 2020-08-12 LAB
A/G RATIO: 1 (ref 1.1–2.2)
A/G RATIO: 1.1 (ref 1.1–2.2)
ALBUMIN SERPL-MCNC: 3.6 G/DL (ref 3.4–5)
ALBUMIN SERPL-MCNC: 3.8 G/DL (ref 3.4–5)
ALP BLD-CCNC: 334 U/L (ref 40–129)
ALP BLD-CCNC: 344 U/L (ref 40–129)
ALT SERPL-CCNC: 28 U/L (ref 10–40)
ALT SERPL-CCNC: 29 U/L (ref 10–40)
ANION GAP SERPL CALCULATED.3IONS-SCNC: 14 MMOL/L (ref 3–16)
ANION GAP SERPL CALCULATED.3IONS-SCNC: 16 MMOL/L (ref 3–16)
AST SERPL-CCNC: 38 U/L (ref 15–37)
AST SERPL-CCNC: 42 U/L (ref 15–37)
BASOPHILS ABSOLUTE: 0.1 K/UL (ref 0–0.2)
BASOPHILS RELATIVE PERCENT: 1.1 %
BILIRUB SERPL-MCNC: 2.6 MG/DL (ref 0–1)
BILIRUB SERPL-MCNC: 3.1 MG/DL (ref 0–1)
BUN BLDV-MCNC: 55 MG/DL (ref 7–20)
BUN BLDV-MCNC: 55 MG/DL (ref 7–20)
CALCIUM SERPL-MCNC: 9.1 MG/DL (ref 8.3–10.6)
CALCIUM SERPL-MCNC: 9.2 MG/DL (ref 8.3–10.6)
CHLORIDE BLD-SCNC: 83 MMOL/L (ref 99–110)
CHLORIDE BLD-SCNC: 85 MMOL/L (ref 99–110)
CO2: 26 MMOL/L (ref 21–32)
CO2: 27 MMOL/L (ref 21–32)
CREAT SERPL-MCNC: 1.9 MG/DL (ref 0.6–1.1)
CREAT SERPL-MCNC: 2 MG/DL (ref 0.6–1.1)
EOSINOPHILS ABSOLUTE: 0 K/UL (ref 0–0.6)
EOSINOPHILS RELATIVE PERCENT: 0.3 %
GFR AFRICAN AMERICAN: 31
GFR AFRICAN AMERICAN: 33
GFR NON-AFRICAN AMERICAN: 26
GFR NON-AFRICAN AMERICAN: 27
GLOBULIN: 3.6 G/DL
GLOBULIN: 3.7 G/DL
GLUCOSE BLD-MCNC: 109 MG/DL (ref 70–99)
GLUCOSE BLD-MCNC: 130 MG/DL (ref 70–99)
GLUCOSE BLD-MCNC: 153 MG/DL (ref 70–99)
GLUCOSE BLD-MCNC: 184 MG/DL (ref 70–99)
HCT VFR BLD CALC: 27.1 % (ref 36–48)
HEMOGLOBIN: 8.8 G/DL (ref 12–16)
INR BLD: 1.31 (ref 0.86–1.14)
LYMPHOCYTES ABSOLUTE: 0.6 K/UL (ref 1–5.1)
LYMPHOCYTES RELATIVE PERCENT: 5.8 %
MCH RBC QN AUTO: 26.2 PG (ref 26–34)
MCHC RBC AUTO-ENTMCNC: 32.6 G/DL (ref 31–36)
MCV RBC AUTO: 80.4 FL (ref 80–100)
MONOCYTES ABSOLUTE: 0.8 K/UL (ref 0–1.3)
MONOCYTES RELATIVE PERCENT: 8.1 %
NEUTROPHILS ABSOLUTE: 8.4 K/UL (ref 1.7–7.7)
NEUTROPHILS RELATIVE PERCENT: 84.7 %
PDW BLD-RTO: 23.1 % (ref 12.4–15.4)
PERFORMED ON: ABNORMAL
PERFORMED ON: ABNORMAL
PLATELET # BLD: 355 K/UL (ref 135–450)
PMV BLD AUTO: 8.2 FL (ref 5–10.5)
POTASSIUM SERPL-SCNC: 3.8 MMOL/L (ref 3.5–5.1)
POTASSIUM SERPL-SCNC: 3.9 MMOL/L (ref 3.5–5.1)
PROTHROMBIN TIME: 15.2 SEC (ref 10–13.2)
RBC # BLD: 3.37 M/UL (ref 4–5.2)
SODIUM BLD-SCNC: 125 MMOL/L (ref 136–145)
SODIUM BLD-SCNC: 126 MMOL/L (ref 136–145)
TOTAL PROTEIN: 7.3 G/DL (ref 6.4–8.2)
TOTAL PROTEIN: 7.4 G/DL (ref 6.4–8.2)
WBC # BLD: 9.9 K/UL (ref 4–11)

## 2020-08-12 PROCEDURE — 6370000000 HC RX 637 (ALT 250 FOR IP): Performed by: INTERNAL MEDICINE

## 2020-08-12 PROCEDURE — 36415 COLL VENOUS BLD VENIPUNCTURE: CPT

## 2020-08-12 PROCEDURE — 6370000000 HC RX 637 (ALT 250 FOR IP): Performed by: SURGERY

## 2020-08-12 PROCEDURE — 2580000003 HC RX 258: Performed by: INTERNAL MEDICINE

## 2020-08-12 PROCEDURE — 94640 AIRWAY INHALATION TREATMENT: CPT

## 2020-08-12 PROCEDURE — 80053 COMPREHEN METABOLIC PANEL: CPT

## 2020-08-12 PROCEDURE — 85025 COMPLETE CBC W/AUTO DIFF WBC: CPT

## 2020-08-12 PROCEDURE — 85610 PROTHROMBIN TIME: CPT

## 2020-08-12 PROCEDURE — 6360000002 HC RX W HCPCS: Performed by: INTERNAL MEDICINE

## 2020-08-12 RX ORDER — POTASSIUM CHLORIDE 20 MEQ/1
20 TABLET, EXTENDED RELEASE ORAL ONCE
Status: COMPLETED | OUTPATIENT
Start: 2020-08-12 | End: 2020-08-12

## 2020-08-12 RX ORDER — FUROSEMIDE 10 MG/ML
100 INJECTION INTRAMUSCULAR; INTRAVENOUS ONCE
Status: COMPLETED | OUTPATIENT
Start: 2020-08-12 | End: 2020-08-12

## 2020-08-12 RX ORDER — MIDODRINE HYDROCHLORIDE 5 MG/1
5 TABLET ORAL 2 TIMES DAILY WITH MEALS
Status: DISCONTINUED | OUTPATIENT
Start: 2020-08-12 | End: 2020-08-12 | Stop reason: HOSPADM

## 2020-08-12 RX ORDER — FERROUS SULFATE 325(65) MG
325 TABLET ORAL 2 TIMES DAILY WITH MEALS
Qty: 60 TABLET | Refills: 0 | Status: SHIPPED | OUTPATIENT
Start: 2020-08-12 | End: 2020-01-01 | Stop reason: ALTCHOICE

## 2020-08-12 RX ORDER — METOLAZONE 2.5 MG/1
10 TABLET ORAL ONCE
Status: COMPLETED | OUTPATIENT
Start: 2020-08-12 | End: 2020-08-12

## 2020-08-12 RX ORDER — POTASSIUM CHLORIDE 20 MEQ/1
20 TABLET, EXTENDED RELEASE ORAL DAILY
Qty: 30 TABLET | Refills: 0 | Status: ON HOLD | OUTPATIENT
Start: 2020-08-12 | End: 2020-09-02 | Stop reason: HOSPADM

## 2020-08-12 RX ORDER — SPIRONOLACTONE 50 MG/1
50 TABLET, FILM COATED ORAL DAILY
Qty: 30 TABLET | Refills: 0 | Status: SHIPPED | OUTPATIENT
Start: 2020-08-13 | End: 2020-01-01

## 2020-08-12 RX ORDER — MIDODRINE HYDROCHLORIDE 5 MG/1
5 TABLET ORAL 2 TIMES DAILY WITH MEALS
Qty: 60 TABLET | Refills: 0 | Status: ON HOLD | OUTPATIENT
Start: 2020-08-12 | End: 2020-09-02 | Stop reason: HOSPADM

## 2020-08-12 RX ORDER — TORSEMIDE 100 MG/1
100 TABLET ORAL DAILY
Qty: 30 TABLET | Refills: 0 | Status: ON HOLD | OUTPATIENT
Start: 2020-08-12 | End: 2020-09-02 | Stop reason: HOSPADM

## 2020-08-12 RX ADMIN — BUSPIRONE HYDROCHLORIDE 30 MG: 5 TABLET ORAL at 08:16

## 2020-08-12 RX ADMIN — DOCUSATE SODIUM 200 MG: 100 CAPSULE, LIQUID FILLED ORAL at 09:36

## 2020-08-12 RX ADMIN — ARIPIPRAZOLE 10 MG: 10 TABLET ORAL at 09:36

## 2020-08-12 RX ADMIN — LAMOTRIGINE 200 MG: 100 TABLET ORAL at 09:36

## 2020-08-12 RX ADMIN — METOLAZONE 10 MG: 2.5 TABLET ORAL at 09:36

## 2020-08-12 RX ADMIN — CLOPIDOGREL BISULFATE 75 MG: 75 TABLET ORAL at 09:37

## 2020-08-12 RX ADMIN — INSULIN LISPRO 1 UNITS: 100 INJECTION, SOLUTION INTRAVENOUS; SUBCUTANEOUS at 11:48

## 2020-08-12 RX ADMIN — TIOTROPIUM BROMIDE INHALATION SPRAY 2 PUFF: 3.12 SPRAY, METERED RESPIRATORY (INHALATION) at 07:54

## 2020-08-12 RX ADMIN — SPIRONOLACTONE 50 MG: 25 TABLET ORAL at 09:36

## 2020-08-12 RX ADMIN — FERROUS SULFATE TAB 325 MG (65 MG ELEMENTAL FE) 325 MG: 325 (65 FE) TAB at 08:15

## 2020-08-12 RX ADMIN — MIDODRINE HYDROCHLORIDE 5 MG: 5 TABLET ORAL at 07:00

## 2020-08-12 RX ADMIN — Medication 10 ML: at 09:37

## 2020-08-12 RX ADMIN — ASPIRIN 81 MG: 81 TABLET ORAL at 09:36

## 2020-08-12 RX ADMIN — FUROSEMIDE 100 MG: 10 INJECTION, SOLUTION INTRAMUSCULAR; INTRAVENOUS at 09:37

## 2020-08-12 RX ADMIN — POTASSIUM CHLORIDE 20 MEQ: 20 TABLET, EXTENDED RELEASE ORAL at 09:37

## 2020-08-12 RX ADMIN — Medication: at 09:38

## 2020-08-12 RX ADMIN — OXYCODONE HYDROCHLORIDE 10 MG: 5 TABLET ORAL at 05:46

## 2020-08-12 RX ADMIN — INSULIN LISPRO 3 UNITS: 100 INJECTION, SOLUTION INTRAVENOUS; SUBCUTANEOUS at 11:48

## 2020-08-12 RX ADMIN — PANTOPRAZOLE SODIUM 40 MG: 40 TABLET, DELAYED RELEASE ORAL at 09:37

## 2020-08-12 ASSESSMENT — PAIN SCALES - GENERAL
PAINLEVEL_OUTOF10: 0
PAINLEVEL_OUTOF10: 8
PAINLEVEL_OUTOF10: 0

## 2020-08-12 NOTE — FLOWSHEET NOTE
08/12/20 0800   Assessment   Charting Type Shift assessment   Neurological   Neuro (WDL) WDL   Level of Consciousness 0   Orientation Level Oriented X4   Cognition Follows commands; Appropriate for developmental age; Appropriate attention/concentration; Appropriate safety awareness; Appropriate judgement; No short term memory loss   Language Clear; Appropriate for developmental age   Gag Present   Swallow Screening   Is the patient able to remain alert for testing?  Yes   Silvia Coma Scale   Eye Opening 4   Best Verbal Response 5   Best Motor Response 6   Silvia Coma Scale Score 15   HEENT   HEENT (WDL) X   Teeth Missing teeth   Mucous Membrane Moist;Pink;Dry   Right Eye Intact   Left Eye Intact   Voice Normal   Respiratory   Respiratory (WDL) X   Respiratory Pattern Regular   Respiratory Depth Normal   Respiratory Quality/Effort Unlabored   Chest Assessment Chest expansion symmetrical;Trachea midline   L Breath Sounds Clear;Diminished   R Breath Sounds Clear;Diminished   Cardiac   Cardiac (WDL) WDL   Cardiac Regularity Regular   Cardiac Rhythm NSR   Heart Sounds S1, S2   Cardiac Monitor   Telemetry Monitor On Yes   Telemetry Audible Yes   Telemetry Alarms Set Yes   Telemetry Box Number [id number]   Gastrointestinal   Abdominal (WDL) X   Tenderness Soft;Tenderness   Abdomen Inspection Rounded;Distended   RUQ Bowel Sounds Hypoactive   LUQ Bowel Sounds Hypoactive   RLQ Bowel Sounds Hypoactive   LLQ Bowel Sounds Hypoactive   Peripheral Vascular   Peripheral Vascular (WDL) X   Edema Generalized   Edema Generalized +1   RLE Edema +1;Pitting   LLE Edema +1;Pitting   RUE Neurovascular Assessment   Capillary Refill Less than/equal to 3 seconds   Color Appropriate for ethnicity   Temperature Warm   R Radial Pulse +2   LUE Neurovascular Assessment   Capillary Refill Less than/equal to 3 seconds   Color Appropriate for ethnicity   Temperature Warm   L Radial Pulse +2   RLE Neurovascular Assessment   Capillary Refill Less than/equal to 3 seconds   Color Appropriate for ethnicity   Temperature Warm   R Pedal Pulse +1   LLE Neurovascular Assessment   Capillary Refill Less than/equal to 3 seconds   Color Appropriate for ethnicity   Temperature Warm   L Pedal Pulse +1   Puncture Site Assessment 1   Site Assessment No redness, drainage, swelling or hematoma   Skin Color/Condition   Skin Color/Condition (WDL) X   Skin Integrity   Skin Integrity (WDL) X   Musculoskeletal   Musculoskeletal (WDL) X   RUE Full movement   LUE Full movement   RL Extremity Full movement   LL Extremity Full movement   Genitourinary   Genitourinary (WDL) WDL   Flank Tenderness No   Suprapubic Tenderness No   Dysuria No   Urine Assessment   Incontinence No   Urine Color Yellow/straw   Urine Appearance Clear   Anus/Rectum   Anus/Rectum (WDL) WDL   Wound 06/18/20 #1, left lower leg cluster, venous, partial thickness, onset 6/1/2020   Date First Assessed/Time First Assessed: 06/18/20 1431   Primary Wound Type: Venous Ulcer  Wound Description (Comments): #1, left lower leg cluster, venous, partial thickness, onset 6/1/2020   Dressing Status Clean;Dry; Intact   Dressing/Treatment Dry dressing   Wound 06/18/20 #2, right lower leg cluster, venous, full thickness, onset 6/1/2020   Date First Assessed/Time First Assessed: 06/18/20 1432   Primary Wound Type: Venous Ulcer  Wound Description (Comments): #2, right lower leg cluster, venous, full thickness, onset 6/1/2020   Dressing Status Clean;Dry; Intact   Dressing/Treatment Dry dressing   Wound 08/05/20 Buttocks Inner;Left   Date First Assessed/Time First Assessed: 08/05/20 0300   Present on Hospital Admission: Yes  Location: Buttocks  Wound Location Orientation: Inner;Left   Dressing Status Clean;Dry; Intact   Dressing/Treatment Foam   Wound 07/30/20 #4, Left lateral foot, diabetic foot ulcer, Scott 1, Onset 7/25/20   Date First Assessed: 07/30/20   Wound Description (Comments): #4, Left lateral foot, diabetic foot ulcer, Scott 1, Onset 7/25/20   Dressing Status Clean;Dry; Intact   Psychosocial   Psychosocial (WDL) WDL

## 2020-08-12 NOTE — CARE COORDINATION
Patient will likely discharge today per MD.  [place] with patient at bedside and she is aware of this. She is still denying any needs from CM. She states her friend will be coming to pick her up when she is discharged.

## 2020-08-12 NOTE — PROGRESS NOTES
normal S1/S2 without murmurs, rubs or gallops. Abdomen: Soft, non-tender, non-distended with normal bowel sounds. Musculoskeletal: No clubbing, cyanosis or edema bilaterally. Full range of motion without deformity. Skin: Skin color, texture, turgor normal.  No rashes or lesions. Neurologic:  Neurovascularly intact without any focal sensory/motor deficits. Cranial nerves: II-XII intact, grossly non-focal.  Psychiatric: Alert and oriented, thought content appropriate, normal insight  Capillary Refill: Brisk,< 3 seconds   Peripheral Pulses: +2 palpable, equal bilaterally       Labs:   Recent Labs     08/10/20  0450 08/11/20  0528 08/12/20  0935   WBC 9.1 9.9 9.9   HGB 8.3* 8.4* 8.8*   HCT 25.9* 26.5* 27.1*    375 355     Recent Labs     08/11/20  1530 08/11/20  2342 08/12/20  0935   * 125* 126*   K 3.9 3.8 3.9   CL 84* 85* 83*   CO2 27 26 27   BUN 52* 55* 55*   CREATININE 1.9* 1.9* 2.0*   CALCIUM 9.2 9.1 9.2     Recent Labs     08/11/20  1530 08/11/20  2342 08/12/20  0935   AST 37 42* 38*   ALT 26 28 29   BILITOT 2.7* 2.6* 3.1*   ALKPHOS 316* 344* 334*     Recent Labs     08/10/20  0450 08/11/20  0528 08/12/20  0935   INR 1.31* 1.27* 1.31*     No results for input(s): CKTOTAL, TROPONINI in the last 72 hours.     Urinalysis:      Lab Results   Component Value Date    NITRU Negative 08/04/2020    WBCUA None seen 08/04/2020    BACTERIA Rare 09/28/2019    RBCUA 3-4 08/04/2020    BLOODU TRACE-INTACT 08/04/2020    SPECGRAV 1.010 08/04/2020    GLUCOSEU Negative 08/04/2020       Consults:    IP CONSULT TO HOSPITALIST  IP CONSULT TO CRITICAL CARE  IP CONSULT TO NEPHROLOGY  IP CONSULT TO CASE MANAGEMENT      Assessment/Plan:    Active Hospital Problems    Diagnosis    CAD (coronary artery disease) [I25.10]     Priority: High    Acute kidney injury superimposed on CKD ([facility] [N17.9, N18.9]    Acute blood loss anemia [D62]    DM (diabetes mellitus), secondary uncontrolled ([facility] [E13.65]    Morbid obesity with BMI of 40.0-44.9, adult ([facility]) [E66.01, Z68.41]    Traumatic perinephric hematoma, left, initial encounter [S37.092A]    Aortocoronary bypass status [Z95.1]    Left renal artery stenosis ([facility]) [I70.1]    Chronic systolic heart failure ([facility]) [I50.22]    COPD (chronic obstructive pulmonary disease) ([facility] [J44.9]    Hypokalemia [E87.6]    Stage 3 chronic kidney disease ([facility]) [N18.3]    Hyponatremia [E87.1]    GERD (gastroesophageal reflux disease) [K21.9]    PAD (peripheral artery disease) ([facility]) [I73.9]    Type 2 diabetes mellitus with diabetic polyneuropathy, with long-term current use of insulin ([facility]) [E11.42, Z79.4]    CLINT (obstructive sleep apnea) [G47.33]    Tobacco abuse disorder [Z72.0]    Essential hypertension [I10]       Perinephric hematoma - noted after renal angiogram on 4 August. Vascular surgery consulted and appreciated, s/p coil embolization of left subsegmental renal artery and CVC placement. ASA/Plavix resumed per vascular. [id number] [address] for d/c per vascular with outpatient f/u in 2 weeks.       SHAUNNA on CKD stage III - likely due ATN, contrast induced nephropathy from recent angiogram.  Nephrology assisting. Will continue to follow serial labs - stable w/ preserved urine output. Reviewed and documented as above.      Chronic systolic CHF - EF 31%. Diuretics on held due to initial hypotension. BP stable. Resume when okay with Nephrology. Acute on chronic anemia - likely due to acute blood loss from hematoma. S/P 2 PRBCs. Follow serial labs - stable. Reviewed and documented as above.     COPD - w/out chronic respiratory failure on no baseline home O2. Controlled on home medication regimen - continued. GERD - w/out active signs/sxs of dysphagia/odynophagia. No evidence of active PUD or hx of GI bleed. Controlled on home PPI - continue.     Leukocytosis - likely due to postsurgical stress response. Pt afebrile with no overt signs of infection.   On empiric antibiotics per critical care - now d/c'd     DM2 - w/ hypoglycemia on admission. Decreased Home Lantus to 10 units QHS and decreased SSI to low dose. Monitor BG and adjust insulin doses as needed. Continue hypoglycemia protocol      HypoNatremia - Nephrology consulted and appreciated. S/P Tolvaptan on 7/8 August.  Will continue to follow serial labs. Reviewed and documented as above.         DVT Prophylaxis: IPC  Diet: DIET GENERAL; Daily Fluid Restriction: 1500 ml  Code Status: Full Code      PT/OT Eval Status: seen w/ recs for home w/ assist.     Stephen Ware - perhaps Wed/Thurs 12/13 August pending increasing/stable Sodium and Nephrology recs.      Sandhya Hernandez MD

## 2020-08-12 NOTE — DISCHARGE SUMMARY
Hospital Medicine Discharge Summary    Patient ID: Ky Wu      Patient's PCP: Ruiz Gavin PA-C    Admit Date: 8/4/2020     Discharge Date: 8/12/2020      Admitting Physician: Delma Donahue MD     Discharge Physician: Lázaro Cisneros MD     Discharge Diagnoses: Active Hospital Problems    Diagnosis    CAD (coronary artery disease) [I25.10]     Priority: High    [facility] kidney [facility] superimposed [facility] CKD [facility] [N17.9, N18.9]    Acute blood loss anemia [D62]    DM (diabetes mellitus), secondary uncontrolled ([facility] [E13.65]    Morbid obesity with BMI of 40.0-44.9, adult ([facility]) [E66.01, Z68.41]    Traumatic perinephric hematoma, left, initial encounter [S37.092A]    Aortocoronary bypass status [Z95.1]    Left renal artery stenosis ([facility]) [I70.1]    Chronic systolic heart failure ([facility]) [I50.22]    COPD (chronic obstructive pulmonary disease) ([facility] [J44.9]    Hypokalemia [E87.6]    Stage 3 chronic kidney disease ([facility]) [N18.3]    Hyponatremia [E87.1]    GERD (gastroesophageal reflux disease) [K21.9]    PAD (peripheral artery disease) ([facility]) [I73.9]    Type 2 diabetes mellitus with diabetic polyneuropathy, with long-term current use of insulin ([facility]) [E11.42, Z79.4]    CLINT (obstructive sleep apnea) [G47.33]    Tobacco abuse disorder [Z72.0]    Essential hypertension [I10]       The patient was seen and examined on day of discharge and this discharge summary is in conjunction with any daily progress note from day of discharge. Hospital Course:       Perinephric hematoma - noted after renal angiogram on 4 August. Vascular surgery consulted and appreciated, s/p coil embolization of left subsegmental renal artery and CVC placement.  ASA/Plavix resumed per vascular. [address] [address] for d/c per vascular with outpatient f/u in 2 weeks.       SHAUNNA on CKD stage III - likely due ATN, contrast induced nephropathy from recent angiogram.  Nephrology assistance appreciated.  Followed serial labs - stable w/ preserved urine output.        Chronic systolic CHF - EF 11%.  Diuretics on held due to initial hypotension. BP stable.  Resumed per Nephrology.      Acute on chronic anemia - likely due to acute blood loss from hematoma. S/P 2 PRBCs. Followed serial labs - stable.      COPD - w/out chronic respiratory failure on no baseline home O2. Controlled on home medication regimen - continued.      GERD - w/out active signs/sxs of dysphagia/odynophagia. No evidence of active PUD or hx of GI bleed. Controlled on home PPI - continue.     Leukocytosis - likely due to postsurgical stress response. Pt afebrile with no overt signs of infection.  On empiric antibiotics per critical care - now d/c'd     DM2 - w/ hypoglycemia on admission. Decreased Home Lantus to 10 units QHS and decreased SSI to low dose. Monitor BG and adjust insulin doses as needed. Continue hypoglycemia protocol      HypoNatremia - Nephrology consulted and appreciated. S/P Tolvaptan on 7/8 August.  Followed serial labs - stable.          Labs: For convenience and continuity at follow-up the following most recent labs are provided:      CBC:    Lab Results   Component Value Date    WBC 9.9 08/12/2020    HGB 8.8 08/12/2020    HCT 27.1 08/12/2020     08/12/2020       Renal:    Lab Results   Component Value Date     08/12/2020    K 3.9 08/12/2020    K 3.5 08/05/2020    CL 83 08/12/2020    CO2 27 08/12/2020    BUN 55 08/12/2020    CREATININE 2.0 08/12/2020    CALCIUM 9.2 08/12/2020    PHOS 3.8 08/06/2020         Significant Diagnostic Studies    Radiology:   CT ABDOMEN PELVIS W IV CONTRAST Additional Contrast? None   Final Result   1. Large left subcapsular and perinephric hematoma, with active bleeding. Hematoma measures at least 12 cm in AP dimension, 11.3 cm in transverse   dimension, and 15 cm in cephalocaudal dimension, compressing and displacing   the left kidney.   The hemorrhage extends both superiorly and inferiorly,   within the left retroperitoneum   2. Critical results were called by Dr. Niraj Simmons to Dr Sy Rousseau on 8/4/2020   at 18:22. Consults:     IP CONSULT TO HOSPITALIST  IP CONSULT TO CRITICAL CARE  IP CONSULT TO NEPHROLOGY  IP CONSULT TO CASE MANAGEMENT    Disposition: home     Condition at Discharge: Stable    Discharge Instructions/Follow-up:  w/ PCP 1-2 weeks and subspecialists as arranged. Code Status:  Full Code    Activity: activity as tolerated    Diet: regular diet      Discharge Medications:     Discharge Medication List as of 8/12/2020  2:40 PM           Details   ferrous sulfate (IRON 325) 325 (65 Fe) MG tablet Take 1 tablet by mouth 2 times daily (with meals), Disp-60 tablet,R-0Print              Details   torsemide (DEMADEX) 100 MG tablet Take 1 tablet by mouth daily 100 mg daily except 50 mg twice weekly (Monday and Thursday). , Disp-30 tablet,R-0Print      spironolactone (ALDACTONE) 50 MG tablet Take 1 tablet by mouth daily, Disp-30 tablet,R-0Print      midodrine (PROAMATINE) 5 MG tablet Take 1 tablet by mouth 2 times daily (with meals), Disp-60 tablet,R-0Print      potassium chloride (KLOR-CON M) 20 MEQ extended release tablet Take 1 tablet by mouth daily, Disp-30 tablet,R-0Print              Details   traZODone (DESYREL) 100 MG tablet Take 100 mg by mouth nightly as needed for Sleep Can take 1-2 tabs nightlyHistorical Med      DOXEPIN HCL PO Take 1 capsule by mouth nightly as needed Patient unsure of exact dosageHistorical Med      albuterol sulfate  (90 Base) MCG/ACT inhaler Inhale 2 puffs into the lungs every 6 hours as needed for Wheezing or Shortness of Breath, Disp-1 Inhaler, R-3Print      Insulin Degludec (TRESIBA FLEXTOUCH) 100 UNIT/ML SOPN Inject 30 Units into the skin nightly, Disp-10 pen, R-5Normal      insulin lispro, 1 Unit Dial, 100 UNIT/ML SOPN Inject 10 Units into the skin 3 times daily, Disp-3 pen, R-2Normal      atorvastatin (LIPITOR) 80 MG tablet Take 1 tablet by mouth nightly, Disp-90 tablet, R-3Normal      tiotropium (SPIRIVA RESPIMAT) 2.5 MCG/ACT AERS inhaler INHALE 2 PUFFS INTO THE LUNGS DAILY, Disp-1 Inhaler, R-6Normal      buprenorphine-naloxone (SUBOXONE) 8-2 MG FILM SL film Place 1 Film under the tongue 2 times daily. Historical Med      benztropine (COGENTIN) 1 MG tablet Take 1 mg by mouth nightly Historical Med      blood glucose test strips (EXACTECH TEST) strip Disp-200 strip, R-6, Normal6 times daily. Insulin Pen Needle (B-D ULTRAFINE III SHORT PEN) 31G X 8 MM MISC Disp-200 each, R-2, PrintFive shots a day      INSULIN SYRINGE .5CC/29G 29G X 1/2\" 0.5 ML MISC DAILY Starting Tue 12/3/2019, Disp-100 each, R-3, Normal      Liraglutide (VICTOZA) 18 MG/3ML SOPN SC injection Inject 1.2 mg into the skin daily, Disp-2 pen, R-3Normal      pantoprazole (PROTONIX) 40 MG tablet Take 1 tablet by mouth 2 times daily, Disp-60 tablet, R-0NO PRINT      clopidogrel (PLAVIX) 75 MG tablet TAKE 1 TABLET BY MOUTH EVERY DAY, Disp-30 tablet, R-5Normal      magnesium oxide (MAG-OX) 400 (240 Mg) MG tablet TAKE 1 TABLET ONCE DAILY, Disp-30 tablet, R-11Normal      busPIRone (BUSPAR) 30 MG tablet Take 30 mg by mouth 2 times daily Historical Med      aspirin EC 81 MG EC tablet Take 1 tablet by mouth daily, Disp-30 tablet, R-3      ARIPiprazole (ABILIFY) 10 MG tablet Take 10 mg by mouth daily Historical Med      lamoTRIgine (LAMICTAL) 150 MG tablet Take 200 mg by mouth 2 times daily Historical Med             Time Spent on discharge is more than 30 minutes in the examination, evaluation, counseling and review of medications and discharge plan. Signed:    Elizabeth Chappell MD   8/12/2020      Thank you Radha Frye PA-C for the opportunity to be involved in this patient's care. If you have any questions or concerns please feel free to contact me at [phone].

## 2020-08-12 NOTE — PROGRESS NOTES
Progress Note          SUBJECTIVE:    We are following this patient for SHAUNNA; renovascular HTN. The patient had had elective renal arteriogram on 8/4 and was found to have significant L renal artery stenosis and she underwent successful stenting and angioplasty of the L renal artery. However, she returned several hours later due to acute onset of L flank pain. CT showed large subcapsular and perinephric hematoma. She had angiography with placement of coil in L renal artery subsegmental vessel on 8/5. Creat 1.2 on admit; 1.5 post-procedure. The patient was seen and examined; creat 1.9 - stable vs last pm. UOP 2.4+ L. Na 125. Wt up 2 lb (up 10 lbs vs admission). ROS: Notes decreasing L flank pain. No chest pain or dyspnea at rest.    Social: No family at bedside. Physical Exam:    Weight: 255 lb 1.6 oz (115.7 kg), BP: 120/78       Constitutional:  Obese, mid-aged female. She is in no distress. Respiratory: Clear. Cardiovascular/Edema:  Reg rhythm; 3+ LE edema. Gastrointestinal: Obese; BS present. Mild L flank tenderness. Abd wall edema. Neurologic: No gross focal neurologic findings. DATA:    Lab Results   Component Value Date    CREATININE 1.9 (H) 08/11/2020    BUN 55 (H) 08/11/2020     (L) 08/11/2020    K 3.8 08/11/2020    CL 85 (L) 08/11/2020    CO2 26 08/11/2020     Lab Results   Component Value Date    WBC 9.9 08/11/2020    HGB 8.4 (L) 08/11/2020    HCT 26.5 (L) 08/11/2020    MCV 80.6 08/11/2020     08/11/2020     IMPRESSION/RECOMMENDATIONS:    1. SHAUNNA. Not oliguric.  - Etiology: ATN (hypotension; contrast dye)   - Evaluate - Serial serum creat values; monitor UOP. - Creat 1.4 --> 1.7 --> 1.8--> 2.0 --> 1.9 --> 1.8 --> 1.9 (8/11 pm)   - UOP  2450+ ml last 24 hrs, though wt is up  - Plan: Maintain SBP; avoid nephrotoxic meds. Will give additional IV Lasix (edema) and metolazone x 1 (despite hyponatremia).     2. Perinephric hematoma.   - Complication of L renal artery angioplasty / stent placement  - S/P coil embolization of subsegmental L renal artery     3. Renovasc HTN.  - S/P L renal artery angioplasty / stent placement  - BP satisfactory. - On scheduled midodrine - change to 5 mg bid  - Follow for late recurrence of HTN (post-perinephric bleed) (I.e, Page kidney).     4. Anemia - blood loss. - Hgb pending - was 8.4 on 8/11 (stable vs 8/10) was 9.3 post-embolization  - S/P transfusion 2 units PRBC's  - Oral Fe added.     5. Hyponatremia  - Has been present consistently since 4/20 (though more severe now)   - ? Etiology - likely multifactorial with pain, volume excess / CHF  and SHAUNNA causing acute changes   - CHF and ? lamictal may be cause of chronic hyponatremia  - Na as low as 120 on 8/9; 124 (8/11 pm). - Rec'd Tolvaptan on 8/7, 8/8 and 8/9.  - Urine Na < 20; serum uric acid 16.0 - c/w CHF as primary cause of hyponatremia.  - Restrict fluid intake and give additional IV Lasix this am; added (resumed) spironolactone.      6. Hypokalemia. - K 3.8 (8/11 pm)  - Rec'd additional KCl supplement on 8/11    7. Hyperbilirubinemia  - ? Due to extravascular hemolysis    If Na 125 or higher and creat 2.0 or lower, may D/C home  When D/C'ed home will need f/u BMP and outpt f/u in 1 week with Dr. Darion Grijalva. She will also need f/u with cardiology, as most recent LVEF had declined. Suggest torsemide 100 mg / d; spironolactone 50 mg / d; KCl 20 mEq / d. Restrict fluids to 1500 ml / day. Restrict dietary Na to 3 grams  Would not use metolazone at this time (due to hyponatremia).

## 2020-08-12 NOTE — PROGRESS NOTES
Pt d/c'd home. Removed  IV and IJ and stopped bleeding. Catheter intact. Pt tolerated well. No redness noted at site. Notified [facility] and removed tele box. Reviewed d/c instructions, home meds, and  f/u information utilizing teach-back method. Scripts  given to patient. Patient verbalized understanding. Wheeled down and driven home by .

## 2020-08-13 ENCOUNTER — HOSPITAL ENCOUNTER (OUTPATIENT)
Dept: WOUND CARE | Age: 55
Discharge: HOME | End: 2020-08-13
Payer: COMMERCIAL

## 2020-08-13 RX ORDER — HYDROCODONE BITARTRATE AND ACETAMINOPHEN 5; 325 MG/1; MG/1
1 TABLET ORAL EVERY 4 HOURS PRN
Qty: 30 TABLET | Refills: 0 | Status: ON HOLD | OUTPATIENT
Start: 2020-08-13 | End: 2020-09-02 | Stop reason: HOSPADM

## 2020-08-14 ENCOUNTER — TELEPHONE (OUTPATIENT)
Dept: ENDOCRINOLOGY | Age: 55
End: 2020-08-14

## 2020-08-14 RX ORDER — INSULIN ASPART 100 [IU]/ML
10 INJECTION, SOLUTION INTRAVENOUS; SUBCUTANEOUS
Qty: 5 PEN | Refills: 3 | Status: ON HOLD | OUTPATIENT
Start: 2020-08-14 | End: 2020-01-01 | Stop reason: CLARIF

## 2020-08-14 NOTE — TELEPHONE ENCOUNTER
[facility] fax wanting alternative to Humalog. It is not covered by [address] [place] [address].      Please RX alternative

## 2020-08-18 ENCOUNTER — HOSPITAL ENCOUNTER (EMERGENCY)
Age: 55
Discharge: ANOTHER HOSPITAL | End: 2020-08-18
Attending: EMERGENCY MEDICINE
Payer: COMMERCIAL

## 2020-08-18 ENCOUNTER — HOSPITAL ENCOUNTER (INPATIENT)
Age: 55
LOS: 15 days | Discharge: HOME HEALTH | DRG: 182 | End: 2020-09-02
Attending: INTERNAL MEDICINE | Admitting: INTERNAL MEDICINE
Payer: COMMERCIAL

## 2020-08-18 ENCOUNTER — APPOINTMENT (OUTPATIENT)
Dept: GENERAL RADIOLOGY | Age: 55
End: 2020-08-18
Payer: COMMERCIAL

## 2020-08-18 ENCOUNTER — APPOINTMENT (OUTPATIENT)
Dept: CT IMAGING | Age: 55
End: 2020-08-18
Payer: COMMERCIAL

## 2020-08-18 VITALS
TEMPERATURE: 97.9 F | HEART RATE: 93 BPM | OXYGEN SATURATION: 95 % | DIASTOLIC BLOOD PRESSURE: 81 MMHG | RESPIRATION RATE: 16 BRPM | HEIGHT: 64 IN | SYSTOLIC BLOOD PRESSURE: 130 MMHG | BODY MASS INDEX: 42.68 KG/M2 | WEIGHT: 250 LBS

## 2020-08-18 PROBLEM — R10.11 RIGHT UPPER QUADRANT PAIN: Status: ACTIVE | Noted: 2020-08-18

## 2020-08-18 LAB
A/G RATIO: 0.9 (ref 1.1–2.2)
ALBUMIN SERPL-MCNC: 3.6 G/DL (ref 3.4–5)
ALP BLD-CCNC: 282 U/L (ref 40–129)
ALT SERPL-CCNC: 16 U/L (ref 10–40)
ANION GAP SERPL CALCULATED.3IONS-SCNC: 15 MMOL/L (ref 3–16)
APTT: 31.5 SEC (ref 24.2–36.2)
AST SERPL-CCNC: 16 U/L (ref 15–37)
BACTERIA: ABNORMAL /HPF
BASOPHILS ABSOLUTE: 0.1 K/UL (ref 0–0.2)
BASOPHILS RELATIVE PERCENT: 0.6 %
BILIRUB SERPL-MCNC: 1.9 MG/DL (ref 0–1)
BILIRUBIN URINE: NEGATIVE
BLOOD, URINE: NEGATIVE
BUN BLDV-MCNC: 62 MG/DL (ref 7–20)
CALCIUM SERPL-MCNC: 9.3 MG/DL (ref 8.3–10.6)
CHLORIDE BLD-SCNC: 80 MMOL/L (ref 99–110)
CLARITY: CLEAR
CO2: 34 MMOL/L (ref 21–32)
COLOR: YELLOW
CREAT SERPL-MCNC: 1.3 MG/DL (ref 0.6–1.1)
D DIMER: 1440 NG/ML DDU (ref 0–229)
EKG ATRIAL RATE: 90 BPM
EKG DIAGNOSIS: NORMAL
EKG P AXIS: 66 DEGREES
EKG P-R INTERVAL: 232 MS
EKG Q-T INTERVAL: 380 MS
EKG QRS DURATION: 138 MS
EKG QTC CALCULATION (BAZETT): 464 MS
EKG R AXIS: -75 DEGREES
EKG T AXIS: 87 DEGREES
EKG VENTRICULAR RATE: 90 BPM
EOSINOPHILS ABSOLUTE: 0 K/UL (ref 0–0.6)
EOSINOPHILS RELATIVE PERCENT: 0.4 %
EPITHELIAL CELLS, UA: ABNORMAL /HPF (ref 0–5)
GFR AFRICAN AMERICAN: 51
GFR NON-AFRICAN AMERICAN: 42
GLOBULIN: 4.2 G/DL
GLUCOSE BLD-MCNC: 195 MG/DL (ref 70–99)
GLUCOSE BLD-MCNC: 280 MG/DL (ref 70–99)
GLUCOSE URINE: NEGATIVE MG/DL
HCT VFR BLD CALC: 31.6 % (ref 36–48)
HEMOGLOBIN: 10 G/DL (ref 12–16)
INR BLD: 1.23 (ref 0.86–1.14)
KETONES, URINE: NEGATIVE MG/DL
LEUKOCYTE ESTERASE, URINE: NEGATIVE
LIPASE: 89 U/L (ref 13–60)
LYMPHOCYTES ABSOLUTE: 0.4 K/UL (ref 1–5.1)
LYMPHOCYTES RELATIVE PERCENT: 4.1 %
MAGNESIUM: 2.7 MG/DL (ref 1.8–2.4)
MCH RBC QN AUTO: 25.6 PG (ref 26–34)
MCHC RBC AUTO-ENTMCNC: 31.6 G/DL (ref 31–36)
MCV RBC AUTO: 80.9 FL (ref 80–100)
MICROSCOPIC EXAMINATION: ABNORMAL
MONOCYTES ABSOLUTE: 0.6 K/UL (ref 0–1.3)
MONOCYTES RELATIVE PERCENT: 6.1 %
NEUTROPHILS ABSOLUTE: 9.3 K/UL (ref 1.7–7.7)
NEUTROPHILS RELATIVE PERCENT: 88.8 %
NITRITE, URINE: NEGATIVE
PDW BLD-RTO: 23.9 % (ref 12.4–15.4)
PERFORMED ON: ABNORMAL
PH UA: 6.5 (ref 5–8)
PLATELET # BLD: 314 K/UL (ref 135–450)
PMV BLD AUTO: 8.6 FL (ref 5–10.5)
POTASSIUM REFLEX MAGNESIUM: 2.8 MMOL/L (ref 3.5–5.1)
POTASSIUM SERPL-SCNC: 2.5 MMOL/L (ref 3.5–5.1)
PRO-BNP: 5283 PG/ML (ref 0–124)
PROTEIN UA: NEGATIVE MG/DL
PROTHROMBIN TIME: 14.2 SEC (ref 10–13.2)
RBC # BLD: 3.91 M/UL (ref 4–5.2)
RBC UA: ABNORMAL /HPF (ref 0–4)
SODIUM BLD-SCNC: 129 MMOL/L (ref 136–145)
SPECIFIC GRAVITY UA: 1.01 (ref 1–1.03)
TOTAL PROTEIN: 7.8 G/DL (ref 6.4–8.2)
TROPONIN: 0.02 NG/ML
TROPONIN: 0.02 NG/ML
URINE TYPE: ABNORMAL
UROBILINOGEN, URINE: 0.2 E.U./DL
WBC # BLD: 10.4 K/UL (ref 4–11)
WBC UA: ABNORMAL /HPF (ref 0–5)

## 2020-08-18 PROCEDURE — 6370000000 HC RX 637 (ALT 250 FOR IP): Performed by: INTERNAL MEDICINE

## 2020-08-18 PROCEDURE — 80053 COMPREHEN METABOLIC PANEL: CPT

## 2020-08-18 PROCEDURE — 85025 COMPLETE CBC W/AUTO DIFF WBC: CPT

## 2020-08-18 PROCEDURE — 93005 ELECTROCARDIOGRAM TRACING: CPT | Performed by: PHYSICIAN ASSISTANT

## 2020-08-18 PROCEDURE — 2580000003 HC RX 258: Performed by: INTERNAL MEDICINE

## 2020-08-18 PROCEDURE — 6370000000 HC RX 637 (ALT 250 FOR IP): Performed by: PHYSICIAN ASSISTANT

## 2020-08-18 PROCEDURE — 36415 COLL VENOUS BLD VENIPUNCTURE: CPT

## 2020-08-18 PROCEDURE — 94640 AIRWAY INHALATION TREATMENT: CPT

## 2020-08-18 PROCEDURE — 6370000000 HC RX 637 (ALT 250 FOR IP): Performed by: NURSE PRACTITIONER

## 2020-08-18 PROCEDURE — 6360000002 HC RX W HCPCS: Performed by: NURSE PRACTITIONER

## 2020-08-18 PROCEDURE — 96376 TX/PRO/DX INJ SAME DRUG ADON: CPT

## 2020-08-18 PROCEDURE — 6360000002 HC RX W HCPCS: Performed by: INTERNAL MEDICINE

## 2020-08-18 PROCEDURE — 6360000002 HC RX W HCPCS: Performed by: EMERGENCY MEDICINE

## 2020-08-18 PROCEDURE — 85610 PROTHROMBIN TIME: CPT

## 2020-08-18 PROCEDURE — 84484 ASSAY OF TROPONIN QUANT: CPT

## 2020-08-18 PROCEDURE — 99285 EMERGENCY DEPT VISIT HI MDM: CPT

## 2020-08-18 PROCEDURE — 83690 ASSAY OF LIPASE: CPT

## 2020-08-18 PROCEDURE — 83735 ASSAY OF MAGNESIUM: CPT

## 2020-08-18 PROCEDURE — 85730 THROMBOPLASTIN TIME PARTIAL: CPT

## 2020-08-18 PROCEDURE — 96374 THER/PROPH/DIAG INJ IV PUSH: CPT

## 2020-08-18 PROCEDURE — 93010 ELECTROCARDIOGRAM REPORT: CPT | Performed by: INTERNAL MEDICINE

## 2020-08-18 PROCEDURE — 6360000002 HC RX W HCPCS: Performed by: PHYSICIAN ASSISTANT

## 2020-08-18 PROCEDURE — 96375 TX/PRO/DX INJ NEW DRUG ADDON: CPT

## 2020-08-18 PROCEDURE — 1200000000 HC SEMI PRIVATE

## 2020-08-18 PROCEDURE — 85379 FIBRIN DEGRADATION QUANT: CPT

## 2020-08-18 PROCEDURE — 84132 ASSAY OF SERUM POTASSIUM: CPT

## 2020-08-18 PROCEDURE — 71045 X-RAY EXAM CHEST 1 VIEW: CPT

## 2020-08-18 PROCEDURE — 81001 URINALYSIS AUTO W/SCOPE: CPT

## 2020-08-18 PROCEDURE — 83880 ASSAY OF NATRIURETIC PEPTIDE: CPT

## 2020-08-18 PROCEDURE — 2580000003 HC RX 258

## 2020-08-18 PROCEDURE — 74176 CT ABD & PELVIS W/O CONTRAST: CPT

## 2020-08-18 PROCEDURE — 96361 HYDRATE IV INFUSION ADD-ON: CPT

## 2020-08-18 RX ORDER — SPIRONOLACTONE 25 MG/1
50 TABLET ORAL DAILY
Status: DISCONTINUED | OUTPATIENT
Start: 2020-08-18 | End: 2020-09-02 | Stop reason: HOSPADM

## 2020-08-18 RX ORDER — MIDODRINE HYDROCHLORIDE 5 MG/1
5 TABLET ORAL 2 TIMES DAILY WITH MEALS
Status: DISCONTINUED | OUTPATIENT
Start: 2020-08-18 | End: 2020-09-01

## 2020-08-18 RX ORDER — BUSPIRONE HYDROCHLORIDE 10 MG/1
30 TABLET ORAL 2 TIMES DAILY
Status: DISCONTINUED | OUTPATIENT
Start: 2020-08-18 | End: 2020-08-18 | Stop reason: SDUPTHER

## 2020-08-18 RX ORDER — INSULIN GLARGINE 100 [IU]/ML
18 INJECTION, SOLUTION SUBCUTANEOUS NIGHTLY
Status: DISCONTINUED | OUTPATIENT
Start: 2020-08-19 | End: 2020-09-02 | Stop reason: HOSPADM

## 2020-08-18 RX ORDER — MORPHINE SULFATE 2 MG/ML
2 INJECTION, SOLUTION INTRAMUSCULAR; INTRAVENOUS ONCE
Status: COMPLETED | OUTPATIENT
Start: 2020-08-18 | End: 2020-08-18

## 2020-08-18 RX ORDER — DEXTROSE MONOHYDRATE 50 MG/ML
100 INJECTION, SOLUTION INTRAVENOUS PRN
Status: DISCONTINUED | OUTPATIENT
Start: 2020-08-18 | End: 2020-09-02 | Stop reason: HOSPADM

## 2020-08-18 RX ORDER — POTASSIUM CHLORIDE 7.45 MG/ML
10 INJECTION INTRAVENOUS ONCE
Status: DISCONTINUED | OUTPATIENT
Start: 2020-08-18 | End: 2020-08-22

## 2020-08-18 RX ORDER — ONDANSETRON 2 MG/ML
4 INJECTION INTRAMUSCULAR; INTRAVENOUS ONCE
Status: COMPLETED | OUTPATIENT
Start: 2020-08-18 | End: 2020-08-18

## 2020-08-18 RX ORDER — NICOTINE POLACRILEX 4 MG
15 LOZENGE BUCCAL PRN
Status: DISCONTINUED | OUTPATIENT
Start: 2020-08-18 | End: 2020-09-02 | Stop reason: HOSPADM

## 2020-08-18 RX ORDER — POTASSIUM CHLORIDE 750 MG/1
40 TABLET, EXTENDED RELEASE ORAL ONCE
Status: COMPLETED | OUTPATIENT
Start: 2020-08-18 | End: 2020-08-18

## 2020-08-18 RX ORDER — BENZTROPINE MESYLATE 1 MG/1
1 TABLET ORAL NIGHTLY
Status: DISCONTINUED | OUTPATIENT
Start: 2020-08-18 | End: 2020-09-02 | Stop reason: HOSPADM

## 2020-08-18 RX ORDER — SODIUM CHLORIDE 0.9 % (FLUSH) 0.9 %
10 SYRINGE (ML) INJECTION PRN
Status: DISCONTINUED | OUTPATIENT
Start: 2020-08-18 | End: 2020-09-02 | Stop reason: HOSPADM

## 2020-08-18 RX ORDER — ASPIRIN 81 MG/1
81 TABLET ORAL DAILY
Status: DISCONTINUED | OUTPATIENT
Start: 2020-08-18 | End: 2020-09-02 | Stop reason: HOSPADM

## 2020-08-18 RX ORDER — POTASSIUM CHLORIDE 20 MEQ/1
40 TABLET, EXTENDED RELEASE ORAL ONCE
Status: COMPLETED | OUTPATIENT
Start: 2020-08-18 | End: 2020-08-18

## 2020-08-18 RX ORDER — DEXTROSE MONOHYDRATE 25 G/50ML
12.5 INJECTION, SOLUTION INTRAVENOUS PRN
Status: DISCONTINUED | OUTPATIENT
Start: 2020-08-18 | End: 2020-09-02 | Stop reason: HOSPADM

## 2020-08-18 RX ORDER — ACETAMINOPHEN 650 MG/1
650 SUPPOSITORY RECTAL EVERY 6 HOURS PRN
Status: DISCONTINUED | OUTPATIENT
Start: 2020-08-18 | End: 2020-09-02 | Stop reason: HOSPADM

## 2020-08-18 RX ORDER — INSULIN GLARGINE 100 [IU]/ML
24 INJECTION, SOLUTION SUBCUTANEOUS ONCE
Status: COMPLETED | OUTPATIENT
Start: 2020-08-18 | End: 2020-08-18

## 2020-08-18 RX ORDER — MORPHINE SULFATE 2 MG/ML
2 INJECTION, SOLUTION INTRAMUSCULAR; INTRAVENOUS
Status: COMPLETED | OUTPATIENT
Start: 2020-08-18 | End: 2020-08-19

## 2020-08-18 RX ORDER — POTASSIUM CHLORIDE 7.45 MG/ML
10 INJECTION INTRAVENOUS ONCE
Status: COMPLETED | OUTPATIENT
Start: 2020-08-18 | End: 2020-08-18

## 2020-08-18 RX ORDER — PANTOPRAZOLE SODIUM 40 MG/1
40 TABLET, DELAYED RELEASE ORAL 2 TIMES DAILY
Status: DISCONTINUED | OUTPATIENT
Start: 2020-08-19 | End: 2020-09-02 | Stop reason: HOSPADM

## 2020-08-18 RX ORDER — BUSPIRONE HYDROCHLORIDE 5 MG/1
30 TABLET ORAL 2 TIMES DAILY
Status: DISCONTINUED | OUTPATIENT
Start: 2020-08-18 | End: 2020-09-02 | Stop reason: HOSPADM

## 2020-08-18 RX ORDER — LAMOTRIGINE 100 MG/1
200 TABLET ORAL 2 TIMES DAILY
Status: DISCONTINUED | OUTPATIENT
Start: 2020-08-18 | End: 2020-09-02 | Stop reason: HOSPADM

## 2020-08-18 RX ORDER — CLOPIDOGREL BISULFATE 75 MG/1
75 TABLET ORAL DAILY
Status: DISCONTINUED | OUTPATIENT
Start: 2020-08-18 | End: 2020-09-02 | Stop reason: HOSPADM

## 2020-08-18 RX ORDER — ALBUTEROL SULFATE 2.5 MG/3ML
2.5 SOLUTION RESPIRATORY (INHALATION) EVERY 4 HOURS PRN
Status: DISCONTINUED | OUTPATIENT
Start: 2020-08-18 | End: 2020-09-02 | Stop reason: HOSPADM

## 2020-08-18 RX ORDER — ALBUTEROL SULFATE 2.5 MG/3ML
2.5 SOLUTION RESPIRATORY (INHALATION) EVERY 4 HOURS PRN
Status: DISCONTINUED | OUTPATIENT
Start: 2020-08-18 | End: 2020-08-18

## 2020-08-18 RX ORDER — ACETAMINOPHEN 325 MG/1
650 TABLET ORAL EVERY 6 HOURS PRN
Status: DISCONTINUED | OUTPATIENT
Start: 2020-08-18 | End: 2020-09-02 | Stop reason: HOSPADM

## 2020-08-18 RX ORDER — SODIUM CHLORIDE 0.9 % (FLUSH) 0.9 %
10 SYRINGE (ML) INJECTION EVERY 12 HOURS SCHEDULED
Status: DISCONTINUED | OUTPATIENT
Start: 2020-08-18 | End: 2020-09-02 | Stop reason: HOSPADM

## 2020-08-18 RX ORDER — FERROUS SULFATE 325(65) MG
325 TABLET ORAL 2 TIMES DAILY WITH MEALS
Status: DISCONTINUED | OUTPATIENT
Start: 2020-08-18 | End: 2020-09-02 | Stop reason: HOSPADM

## 2020-08-18 RX ORDER — SODIUM CHLORIDE 9 MG/ML
INJECTION, SOLUTION INTRAVENOUS
Status: COMPLETED
Start: 2020-08-18 | End: 2020-08-18

## 2020-08-18 RX ORDER — ATORVASTATIN CALCIUM 80 MG/1
80 TABLET, FILM COATED ORAL NIGHTLY
Status: DISCONTINUED | OUTPATIENT
Start: 2020-08-18 | End: 2020-09-02 | Stop reason: HOSPADM

## 2020-08-18 RX ORDER — BUPRENORPHINE AND NALOXONE 8; 2 MG/1; MG/1
1 FILM, SOLUBLE BUCCAL; SUBLINGUAL 2 TIMES DAILY
Status: DISCONTINUED | OUTPATIENT
Start: 2020-08-18 | End: 2020-09-02 | Stop reason: HOSPADM

## 2020-08-18 RX ORDER — TRAZODONE HYDROCHLORIDE 50 MG/1
100 TABLET ORAL NIGHTLY PRN
Status: DISCONTINUED | OUTPATIENT
Start: 2020-08-18 | End: 2020-09-02 | Stop reason: HOSPADM

## 2020-08-18 RX ORDER — TRAMADOL HYDROCHLORIDE 50 MG/1
50 TABLET ORAL EVERY 6 HOURS PRN
Status: DISCONTINUED | OUTPATIENT
Start: 2020-08-18 | End: 2020-09-02 | Stop reason: HOSPADM

## 2020-08-18 RX ORDER — POTASSIUM CHLORIDE 7.45 MG/ML
10 INJECTION INTRAVENOUS
Status: DISCONTINUED | OUTPATIENT
Start: 2020-08-18 | End: 2020-08-18

## 2020-08-18 RX ORDER — INSULIN GLARGINE 100 [IU]/ML
24 INJECTION, SOLUTION SUBCUTANEOUS NIGHTLY
Status: DISCONTINUED | OUTPATIENT
Start: 2020-08-18 | End: 2020-08-18

## 2020-08-18 RX ORDER — MORPHINE SULFATE 4 MG/ML
4 INJECTION, SOLUTION INTRAMUSCULAR; INTRAVENOUS
Status: COMPLETED | OUTPATIENT
Start: 2020-08-18 | End: 2020-08-19

## 2020-08-18 RX ORDER — POTASSIUM CHLORIDE 20 MEQ/1
20 TABLET, EXTENDED RELEASE ORAL DAILY
Status: DISCONTINUED | OUTPATIENT
Start: 2020-08-18 | End: 2020-08-24

## 2020-08-18 RX ORDER — POLYETHYLENE GLYCOL 3350 17 G/17G
17 POWDER, FOR SOLUTION ORAL DAILY PRN
Status: DISCONTINUED | OUTPATIENT
Start: 2020-08-18 | End: 2020-08-28

## 2020-08-18 RX ORDER — ARIPIPRAZOLE 10 MG/1
10 TABLET ORAL DAILY
Status: DISCONTINUED | OUTPATIENT
Start: 2020-08-18 | End: 2020-09-02 | Stop reason: HOSPADM

## 2020-08-18 RX ADMIN — ASPIRIN 81 MG: 81 TABLET, COATED ORAL at 22:13

## 2020-08-18 RX ADMIN — CLOPIDOGREL BISULFATE 75 MG: 75 TABLET ORAL at 22:12

## 2020-08-18 RX ADMIN — MORPHINE SULFATE 2 MG: 2 INJECTION, SOLUTION INTRAMUSCULAR; INTRAVENOUS at 13:03

## 2020-08-18 RX ADMIN — POTASSIUM CHLORIDE 40 MEQ: 20 TABLET, EXTENDED RELEASE ORAL at 22:12

## 2020-08-18 RX ADMIN — SODIUM CHLORIDE 500 ML: 9 INJECTION, SOLUTION INTRAVENOUS at 23:08

## 2020-08-18 RX ADMIN — INSULIN LISPRO 1 UNITS: 100 INJECTION, SOLUTION INTRAVENOUS; SUBCUTANEOUS at 22:13

## 2020-08-18 RX ADMIN — POTASSIUM CHLORIDE 40 MEQ: 750 TABLET, EXTENDED RELEASE ORAL at 15:03

## 2020-08-18 RX ADMIN — INSULIN GLARGINE 24 UNITS: 100 INJECTION, SOLUTION SUBCUTANEOUS at 22:13

## 2020-08-18 RX ADMIN — ALBUTEROL SULFATE 2.5 MG: 2.5 SOLUTION RESPIRATORY (INHALATION) at 21:32

## 2020-08-18 RX ADMIN — ARIPIPRAZOLE 10 MG: 10 TABLET ORAL at 22:13

## 2020-08-18 RX ADMIN — LAMOTRIGINE 200 MG: 100 TABLET ORAL at 22:12

## 2020-08-18 RX ADMIN — FUROSEMIDE 5 MG/HR: 10 INJECTION, SOLUTION INTRAMUSCULAR; INTRAVENOUS at 22:06

## 2020-08-18 RX ADMIN — POTASSIUM CHLORIDE 40 MEQ: 20 TABLET, EXTENDED RELEASE ORAL at 23:27

## 2020-08-18 RX ADMIN — MAGNESIUM OXIDE TAB 400 MG (241.3 MG ELEMENTAL MG) 400 MG: 400 (241.3 MG) TAB at 22:12

## 2020-08-18 RX ADMIN — BUSPIRONE HYDROCHLORIDE 30 MG: 5 TABLET ORAL at 22:12

## 2020-08-18 RX ADMIN — ATORVASTATIN CALCIUM 80 MG: 80 TABLET, FILM COATED ORAL at 22:12

## 2020-08-18 RX ADMIN — Medication 10 ML: at 21:41

## 2020-08-18 RX ADMIN — MORPHINE SULFATE 2 MG: 2 INJECTION, SOLUTION INTRAMUSCULAR; INTRAVENOUS at 21:41

## 2020-08-18 RX ADMIN — POTASSIUM CHLORIDE 10 MEQ: 7.46 INJECTION, SOLUTION INTRAVENOUS at 23:08

## 2020-08-18 RX ADMIN — MORPHINE SULFATE 2 MG: 2 INJECTION, SOLUTION INTRAMUSCULAR; INTRAVENOUS at 14:10

## 2020-08-18 RX ADMIN — ONDANSETRON HYDROCHLORIDE 4 MG: 2 INJECTION, SOLUTION INTRAMUSCULAR; INTRAVENOUS at 13:02

## 2020-08-18 RX ADMIN — BENZTROPINE MESYLATE 1 MG: 1 TABLET ORAL at 22:12

## 2020-08-18 RX ADMIN — TRAZODONE HYDROCHLORIDE 100 MG: 50 TABLET ORAL at 22:13

## 2020-08-18 RX ADMIN — POTASSIUM CHLORIDE 10 MEQ: 7.46 INJECTION, SOLUTION INTRAVENOUS at 13:31

## 2020-08-18 ASSESSMENT — PAIN SCALES - GENERAL
PAINLEVEL_OUTOF10: 10
PAINLEVEL_OUTOF10: 7
PAINLEVEL_OUTOF10: 10
PAINLEVEL_OUTOF10: 10
PAINLEVEL_OUTOF10: 8
PAINLEVEL_OUTOF10: 10
PAINLEVEL_OUTOF10: 7
PAINLEVEL_OUTOF10: 9
PAINLEVEL_OUTOF10: 7
PAINLEVEL_OUTOF10: 7
PAINLEVEL_OUTOF10: 10
PAINLEVEL_OUTOF10: 8

## 2020-08-18 ASSESSMENT — PAIN DESCRIPTION - ORIENTATION
ORIENTATION: RIGHT;UPPER
ORIENTATION: RIGHT
ORIENTATION: RIGHT;UPPER
ORIENTATION: RIGHT

## 2020-08-18 ASSESSMENT — PAIN DESCRIPTION - PAIN TYPE
TYPE: ACUTE PAIN

## 2020-08-18 ASSESSMENT — ENCOUNTER SYMPTOMS
SHORTNESS OF BREATH: 0
DIARRHEA: 0
ABDOMINAL PAIN: 1
VOMITING: 0
COUGH: 0
CONSTIPATION: 1
NAUSEA: 1

## 2020-08-18 ASSESSMENT — PAIN DESCRIPTION - LOCATION
LOCATION: ABDOMEN
LOCATION: FLANK
LOCATION: ABDOMEN
LOCATION: FLANK

## 2020-08-18 ASSESSMENT — PAIN DESCRIPTION - DESCRIPTORS
DESCRIPTORS: ACHING
DESCRIPTORS: RADIATING

## 2020-08-18 ASSESSMENT — PAIN DESCRIPTION - PROGRESSION: CLINICAL_PROGRESSION: GRADUALLY WORSENING

## 2020-08-18 ASSESSMENT — PAIN DESCRIPTION - FREQUENCY: FREQUENCY: CONTINUOUS

## 2020-08-18 ASSESSMENT — PAIN DESCRIPTION - DIRECTION: RADIATING_TOWARDS: CHEST

## 2020-08-18 ASSESSMENT — PAIN DESCRIPTION - ONSET: ONSET: PROGRESSIVE

## 2020-08-18 ASSESSMENT — PAIN - FUNCTIONAL ASSESSMENT: PAIN_FUNCTIONAL_ASSESSMENT: PREVENTS OR INTERFERES WITH MANY ACTIVE NOT PASSIVE ACTIVITIES

## 2020-08-18 NOTE — PROGRESS NOTES
4 Eyes Skin Assessment     The patient is being assess for   Admission    I agree that 2 RN's have performed a thorough Head to Toe Skin Assessment on the patient. ALL assessment sites listed below have been assessed. Areas assessed by both nurses:   [x]   Head, Face, and Ears   [x]   Shoulders, Back, and Chest, Abdomen  [x]   Arms, Elbows, and Hands   [x]   Coccyx, Sacrum, and Ischium  [x]   Legs, Feet, and Heels        Stage II pressure Ulcers on left inner buttocks, left lateral foot diabetic foot ulcer, left second toe diabetic foot ulcer, left lower leg cluster venous partial thickness, right lower leg cluster venous full thickness. **SHARE this note so that the co-signing nurse is able to place an eSignature**    Co-signer eSignature: Electronically signed by Mary Forbes RN on 8/18/20 at 6:40 PM EDT    Does the Patient have Skin Breakdown?   Yes LDA WOUND CARE was Initiated documentation include the Shaye-wound, Wound Assessment, Measurements, Dressing Treatment, Drainage, and Color\",          Vivek Prevention initiated:  Yes   Wound Care Orders initiated:  Yes      [id number]  nurse consulted for Pressure Injury (Stage 3,4, Unstageable, DTI, NWPT, Complex wounds)and New or Established Ostomies:  No      Primary Nurse eSignature: Electronically signed by Mary Forbes RN on 8/18/20 at 6:40 PM EDT

## 2020-08-18 NOTE — PROGRESS NOTES
RN paged MD    Direct admit from [place]. [place] on the floor. VS and assessment completed. Will await admission orders. Patient will need wound care due to multiple wounds. Please and thank you!

## 2020-08-18 NOTE — ED PROVIDER NOTES
[address] [facility]        Pt Name: Naty Sandhu  MRN: [id number]  [place] 1965  Date of evaluation: 8/18/2020  Provider: Hiro Campbell PA-C  PCP: Ankit Meadows PA-C    Shared Visit or Autonomous Visit:  I have seen and evaluated this patient with my supervising physician Sheron Lazo MD.    [address] [facility]       Chief Complaint   Patient presents with    Flank Pain     pt states she had stent placed in kidney last week, states she continues to have R flank pain since then       HISTORY OF PRESENT ILLNESS   (Location/Symptom, Timing/Onset, Context/Setting, Quality, Duration, Modifying Factors, Severity)  Note limiting factors. Naty Sandhu is a 62 y.o. female presenting to the emergency department with complaint of right back/flank pain radiates into RUQ abdomen right shoulder blade area and chest pain symptoms started 8 PM last night started suddenly. No abdominal pain. Nausea, no vomiting. No fever. She had called PCP yesterday Nettie Peñaloza because of constipation and she ordered CT of abdomen states she had a BM yesterday. Recent renal angiogram 8/4 with traumatic left perinephric hematoma. 2 units blood transfusion. S/p coil embolization left subsegmental renal artery and CVC placement. Aspirin and plavix resumed. The history is provided by the patient. Abdominal Pain   Pain location:  R flank  Pain quality: aching    Pain radiates to:  Chest, back and RUQ  Onset quality:  Sudden  Duration:  1 day  Timing:  Constant  Progression:  Unchanged  Chronicity:  New  Worsened by:   Movement, position changes and deep breathing  Associated symptoms: constipation and nausea    Associated symptoms: no chest pain, no chills, no cough, no diarrhea, no dysuria, no fever, no shortness of breath and no vomiting    Chest Pain   Pain location:  Substernal area  Pain quality: aching    Duration:  1 day  Chronicity:  New  Associated symptoms: abdominal pain and nausea    Associated symptoms: no cough, no fever, no shortness of breath, no syncope and no vomiting      Nursing Notes were reviewed    REVIEW OF SYSTEMS    (2-9 systems for level 4, 10 or more for level 5)     Review of Systems   Constitutional: Negative for chills and fever. Respiratory: Negative for cough and shortness of breath. Cardiovascular: Positive for leg swelling. Negative for chest pain and syncope. Gastrointestinal: Positive for abdominal pain, constipation and nausea. Negative for diarrhea and vomiting. Genitourinary: Negative for difficulty urinating and dysuria. Neurological: Negative for syncope. All other systems reviewed and are negative. Positives and Pertinent negatives as per HPI.        PAST MEDICAL HISTORY     Past Medical History:   Diagnosis Date    Acute kidney injury ([facility] 12/25/2019    Acute on chronic congestive heart failure (HCC)     Alcohol dependence ([facility] 3/10/2010    Bipolar 1 disorder ([facility]     CAD (coronary artery disease)     Cardiomyopathy ([facility] 06/29/2020    EF= 25%    Cellulitis 6/3/2020    CHF (congestive heart failure) ([facility]     COPD (chronic obstructive pulmonary disease) ([facility])     Diabetic ulcer of left foot associated with type 2 diabetes mellitus ([address] 1/18/2020    Diabetic ulcer of toe of right foot associated with type 2 diabetes mellitus ([facility] 1/18/2020    GERD (gastroesophageal reflux disease)     High cholesterol     HTN (hypertension)     Kidney disease, chronic, stage II (mild, EGFR 60+ ml/min)     MI (myocardial infarction) ([address] 10/07/2019    NSTEMI    Positive FIT (fecal immunochemical test) 2/28/2020    Pulmonary nodule     PVD (peripheral vascular disease) ([facility]          SURGICAL HISTORY     Past Surgical History:   Procedure Laterality Date    ARTERIAL BYPASS SURGRY Left 01/06/2016    Axillary/Subclavian to Brachial Bypass w/reversed SVG    CARDIAC CATHETERIZATION  03/27/2018    Dr. Jonas Siddiqi - at [facility]    CARDIAC CATHETERIZATION  01/20/2020    Dr. Luci Jc      pancreatitis post surgery    CORONARY ANGIOPLASTY WITH STENT PLACEMENT  03/16/2018    MELODY- 3.5 x 38 and 3.5 x 20 to Cx    CORONARY ANGIOPLASTY WITH STENT PLACEMENT  01/03/2018    MELODY- 3.0 x 38 and 2.75 x 26 and 2.75 x 8 and 2.75 x 22 to the LAD    CORONARY ANGIOPLASTY WITH STENT PLACEMENT  10/07/2019    MELODY- 2.5 x 8 to [address] GRAFT N/A 1/27/2020    CORONARY ARTERY BYPASS GRAFTING X 1, ON PUMP BEATING HEART, INTERNAL MAMMARY ARTERY TO LEFT ANTERIOR DESCENDING ARTERY, VAS CATH INSERTION, URI, PLATELET GEL APPLICATION, 5 LEVEL BILATERAL INTERCOSTAL NERVE BLOCK, STERNAL PLATING performed by Gumaro Hankins MD at [address] Right 5/16/2019    fem-pop, performed by Senia Del Valle MD at [address]  02/14/2019    CardioMEMs insertion for CHF    KIDNEY SURGERY      ROTATOR CUFF REPAIR Left 04/22/2016    TONSILLECTOMY      TRANSESOPHAGEAL ECHOCARDIOGRAM  01/26/2020    TRANSLUMINAL ANGIOPLASTY Left 10/08/2019    with stenting, at [facility]    [address] Left 04/29/2020    of the [facility] re-occlusion    TUMOR EXCISION      benign tumors X 2 chest & axilla    UPPER GASTROINTESTINAL ENDOSCOPY  4/25/2013    BX barrettBAYLEE vang, gastritis    UPPER GASTROINTESTINAL ENDOSCOPY  12/10/2015    UPPER GASTROINTESTINAL ENDOSCOPY  01/06/2017    Gastritis    VASCULAR SURGERY Left 12/08/2015    upper extremity and mesenteric angiogram (diagnostic only)    VASCULAR SURGERY Bilateral 07/07/2020    diagnostic lower extremity angiogram only         CURRENTMEDICATIONS       Discharge Medication List as of 8/18/2020  5:03 PM      CONTINUE these medications which have NOT CHANGED    Details   insulin aspart (NOVOLOG FLEXPEN) 100 UNIT/ML injection pen Inject 10 Units into the skin 3 times daily (before meals), Disp-5 pen,R-3Normal (EXACTECH TEST) strip Disp-200 strip, R-6, Normal6 times daily.       Insulin Pen Needle (B-D ULTRAFINE III SHORT PEN) 31G X 8 MM MISC Disp-200 each, R-2, PrintFive shots a day      INSULIN SYRINGE .5CC/29G 29G X 1/2\" 0.5 ML MISC DAILY Starting Tue 12/3/2019, Disp-100 each, R-3, Normal      Liraglutide (VICTOZA) 18 MG/3ML SOPN SC injection Inject 1.2 mg into the skin daily, Disp-2 pen, R-3Normal      pantoprazole (PROTONIX) 40 MG tablet Take 1 tablet by mouth 2 times daily, Disp-60 tablet, R-0NO PRINT      clopidogrel (PLAVIX) 75 MG tablet TAKE 1 TABLET BY MOUTH EVERY DAY, Disp-30 tablet, R-5Normal      magnesium oxide (MAG-OX) 400 (240 Mg) MG tablet TAKE 1 TABLET ONCE DAILY, Disp-30 tablet, R-11Normal      busPIRone (BUSPAR) 30 MG tablet Take 30 mg by mouth 2 times daily Historical Med      aspirin EC 81 MG EC tablet Take 1 tablet by mouth daily, Disp-30 tablet, R-3      ARIPiprazole (ABILIFY) 10 MG tablet Take 10 mg by mouth daily Historical Med      lamoTRIgine (LAMICTAL) 150 MG tablet Take 200 mg by mouth 2 times daily Historical Med               ALLERGIES     Lisinopril    FAMILYHISTORY       Family History   Problem Relation Age of Onset    Heart Disease Maternal Grandmother     Cancer Mother         lung and brain cancer    Cancer Maternal Aunt         lung and brain cancer          SOCIAL HISTORY       Social History     Socioeconomic History    Marital status: Single     Spouse name: None    Number of children: None    Years of education: None    Highest education level: None   Occupational History    None   Social Needs    Financial resource strain: None    Food insecurity     Worry: None     Inability: None    Transportation needs     Medical: None     Non-medical: None   Tobacco Use    Smoking status: Current Every Day Smoker     Packs/day: 0.50     Years: 30.00     Pack years: 15.00     Types: Cigarettes     Last attempt to quit: 2019     Years since quittin.0    Smokeless tobacco: Pulmonary:      Effort: Pulmonary effort is normal. No respiratory distress. Breath sounds: Normal breath sounds. No stridor. No wheezing, rhonchi or rales. Abdominal:      General: Bowel sounds are normal.      Palpations: Abdomen is soft. Abdomen is not rigid. There is no mass. Tenderness: There is abdominal tenderness in the right upper quadrant. There is right CVA tenderness. There is no guarding or rebound. Musculoskeletal: Normal range of motion. Right lower leg: Edema present. Left lower leg: Edema present. Skin:     General: Skin is warm and dry. Findings: No rash. Neurological:      Mental Status: She is alert and oriented to person, place, and time. GCS: GCS eye subscore is 4. GCS verbal subscore is 5. GCS motor subscore is 6. Cranial Nerves: No cranial nerve deficit. Sensory: No sensory deficit. Motor: No abnormal muscle tone. Coordination: Coordination normal.   Psychiatric:         Behavior: Behavior normal.         DIAGNOSTIC RESULTS   LABS:    Labs Reviewed   CBC WITH AUTO DIFFERENTIAL - Abnormal; Notable for the following components:       Result Value    RBC 3.91 (*)     Hemoglobin 10.0 (*)     Hematocrit 31.6 (*)     MCH 25.6 (*)     RDW 23.9 (*)     Neutrophils Absolute 9.3 (*)     Lymphocytes Absolute 0.4 (*)     All other components within normal limits    Narrative:     Performed at:  [facility]. [facility]  [address] [place] [address].  [address]   Phone [phone]   COMPREHENSIVE METABOLIC PANEL - Abnormal; Notable for the following components:    Sodium 129 (*)     Potassium 2.5 (*)     Chloride 80 (*)     CO2 34 (*)     Glucose 280 (*)     BUN 62 (*)     CREATININE 1.3 (*)     GFR Non- 42 (*)     GFR African American 51 (*)     Albumin/Globulin Ratio 0.9 (*)     Total Bilirubin 1.9 (*)     Alkaline Phosphatase 282 (*)     All other components within normal limits    Narrative: [facility]  [address] [facility]. [place], [address]   Phone [phone]   APTT    Narrative:     Performed at:  [facility]. [facility]  [address] [facility].  [place], [address]   Phone [phone]     Results for orders placed or performed during the hospital encounter of 08/18/20   CBC Auto Differential   Result Value Ref Range    WBC 10.4 4.0 - 11.0 K/uL    RBC 3.91 (L) 4.00 - 5.20 M/uL    Hemoglobin 10.0 (L) 12.0 - 16.0 g/dL    Hematocrit 31.6 (L) 36.0 - 48.0 %    MCV 80.9 80.0 - 100.0 fL    MCH 25.6 (L) 26.0 - 34.0 pg    MCHC 31.6 31.0 - 36.0 g/dL    RDW 23.9 (H) 12.4 - 15.4 %    Platelets 329 913 - 276 K/uL    MPV 8.6 5.0 - 10.5 fL    Neutrophils % 88.8 %    Lymphocytes % 4.1 %    Monocytes % 6.1 %    Eosinophils % 0.4 %    Basophils % 0.6 %    Neutrophils Absolute 9.3 (H) 1.7 - 7.7 K/uL    Lymphocytes Absolute 0.4 (L) 1.0 - 5.1 K/uL    Monocytes Absolute 0.6 0.0 - 1.3 K/uL    Eosinophils Absolute 0.0 0.0 - 0.6 K/uL    Basophils Absolute 0.1 0.0 - 0.2 K/uL   Comprehensive Metabolic Panel   Result Value Ref Range    Sodium 129 (L) 136 - 145 mmol/L    Potassium 2.5 (LL) 3.5 - 5.1 mmol/L    Chloride 80 (L) 99 - 110 mmol/L    CO2 34 (H) 21 - 32 mmol/L    Anion Gap 15 3 - 16    Glucose 280 (H) 70 - 99 mg/dL    BUN 62 (H) 7 - 20 mg/dL    CREATININE 1.3 (H) 0.6 - 1.1 mg/dL    GFR Non-African American 42 (A) >60    GFR  51 (A) >60    Calcium 9.3 8.3 - 10.6 mg/dL    Total Protein 7.8 6.4 - 8.2 g/dL    Alb 3.6 3.4 - 5.0 g/dL    Albumin/Globulin Ratio 0.9 (L) 1.1 - 2.2    Total Bilirubin 1.9 (H) 0.0 - 1.0 mg/dL    Alkaline Phosphatase 282 (H) 40 - 129 U/L    ALT 16 10 - 40 U/L    AST 16 15 - 37 U/L    Globulin 4.2 g/dL   APTT   Result Value Ref Range    aPTT 31.5 24.2 - 36.2 sec   Protime-INR   Result Value Ref Range    Protime 14.2 (H) 10.0 - 13.2 sec    INR 1.23 (H) 0.86 - 1.14   Urinalysis with Microscopic   Result Value Ref Range Color, UA Yellow Straw/Yellow    Clarity, UA Clear Clear    Glucose, Ur Negative Negative mg/dL    Bilirubin Urine Negative Negative    Ketones, Urine Negative Negative mg/dL    Specific Gravity, UA 1.010 1.005 - 1.030    Blood, Urine Negative Negative    pH, UA 6.5 5.0 - 8.0    Protein, UA Negative Negative mg/dL    Urobilinogen, Urine 0.2 <2.0 E.U./dL    Nitrite, Urine Negative Negative    Leukocyte Esterase, Urine Negative Negative    Microscopic Examination Not Indicated     Urine Type NotGiven     WBC, UA 0-2 0 - 5 /HPF    RBC, UA 0-2 0 - 4 /HPF    Epithelial Cells, UA 0-1 0 - 5 /HPF    Bacteria, UA Rare (A) None Seen /HPF   Troponin   Result Value Ref Range    Troponin 0.02 (H) <0.01 ng/mL   Brain Natriuretic Peptide   Result Value Ref Range    Pro-BNP 5,283 (H) 0 - 124 pg/mL   Lipase   Result Value Ref Range    Lipase 89.0 (H) 13.0 - 60.0 U/L   D-dimer, quantitative   Result Value Ref Range    D-Dimer, Quant 1440 (H) 0 - 229 ng/mL DDU   EKG 12 Lead   Result Value Ref Range    Ventricular Rate 90 BPM    Atrial Rate 90 BPM    P-R Interval 232 ms    QRS Duration 138 ms    Q-T Interval 380 ms    QTc Calculation (Bazett) 464 ms    P Axis 66 degrees    R Axis -75 degrees    T Axis 87 degrees    Diagnosis       Sinus rhythm with 1st degree A-V blockPossible Left atrial enlargementLeft axis deviationRight bundle branch blockInferior infarct , age undeterminedAnterolateral infarct , age undeterminedAbnormal ECGNo significant change was foundWhen compared with ECG of8.4.20Confirmed by JUANA VALENTINE MD ([id number]) on 8/18/2020 5:11:11 PM       All other labs were within normal range or not returned as of this dictation. EKG: All EKG's are interpreted by the Emergency Department Physician in the absence of a cardiologist.  Please see their note for interpretation of EKG.       RADIOLOGY:   Non-plain film images such as CT, Ultrasound and MRI are read by the radiologist. Plain radiographic images are visualized andpreliminarily interpreted by the  ED Provider with the below findings:        Interpretation perthe Radiologist below, if available at the time of this note:    CT ABDOMEN PELVIS WO CONTRAST   Final Result   1. Persistent left subcapsular/perinephric hematoma, not significantly   changed since August 4, 2020, measuring approximately 14.7 x 12.1 x 10.1 cm   (craniocaudal by AP by transverse). The hematoma is compressing the left   kidney. Hyperdense material in the left kidney probably represents   embolization coil or surgical clip. 2. Small left pleural effusion with left base compressive atelectasis. 3. Small amount of free fluid in the pelvis. 4. Circumferential subcutaneous edema of the abdomen and pelvic wall. XR CHEST PORTABLE   Final Result   Small left pleural effusion with left basilar airspace disease, most likely   atelectasis. Ct Abdomen Pelvis Wo Contrast    Result Date: 8/18/2020  EXAMINATION: CT OF THE ABDOMEN AND PELVIS WITHOUT CONTRAST 8/18/2020 12:40 pm TECHNIQUE: CT of the abdomen and pelvis was performed without the administration of intravenous contrast. Multiplanar reformatted images are provided for review. Dose modulation, iterative reconstruction, and/or weight based adjustment of the mA/kV was utilized to reduce the radiation dose to as low as reasonably achievable. COMPARISON: August 4, 2020. HISTORY: ORDERING SYSTEM PROVIDED HISTORY: rt flank pain TECHNOLOGIST PROVIDED HISTORY: Reason for exam:->rt flank pain Reason for exam:->abdominal pain Reason for Exam:  pt states she had kidney surgery last week with stent placement due to lack of blood flow to kidney and kidney was knicked during surgery, right flank pain Acuity: Acute Type of Exam: Initial Colicky right flank pain. Initial encounter. FINDINGS: Lower Chest: Median sternotomy. Small left pleural effusion with left lower lobe compressive atelectasis.   There is a calcified granuloma in the left lower lobe. Organs: Unenhanced liver, spleen, pancreas, adrenal glands are unremarkable. Gallbladder is absent. Hyperdense left perinephric subscapular hematoma has not significantly changed since prior exam, measuring up to 12.1 cm in long dimension. Hyperdense material in the left kidney of unclear etiology but may represent embolization coil. There is mild right perinephric stranding. No hydronephrosis. GI/Bowel: Small to moderate volume of stool in the colon. No evidence of bowel obstruction or free intraperitoneal air. Pelvis: Bladder is under distended. Small amount of free fluid in the pelvis. Peritoneum/Retroperitoneum: Abdominal aorta is atherosclerotic. Bones/Soft Tissues: Circumferential subcutaneous edema of the abdominal and pelvic wall. No aggressive lytic or blastic bony lesion. 1. Persistent left subcapsular/perinephric hematoma, not significantly changed since August 4, 2020, measuring approximately 14.7 x 12.1 x 10.1 cm (craniocaudal by AP by transverse). The hematoma is compressing the left kidney. Hyperdense material in the left kidney probably represents embolization coil or surgical clip. 2. Small left pleural effusion with left base compressive atelectasis. 3. Small amount of free fluid in the pelvis. 4. Circumferential subcutaneous edema of the abdomen and pelvic wall. Xr Chest Portable    Result Date: 8/18/2020  EXAMINATION: ONE XRAY VIEW OF THE CHEST 8/18/2020 12:39 pm COMPARISON: June 29, 2020 HISTORY: ORDERING SYSTEM PROVIDED HISTORY: chest pain TECHNOLOGIST PROVIDED HISTORY: Reason for exam:->chest pain Reason for Exam:  pt states she has kidney surgery last week with stent placement due to lack of blood flow to kidney and kidney was knicked during surgery, right flank pain Acuity: Acute Type of Exam: Subsequent/Follow-up FINDINGS: Status post sternotomy. Unchanged cardiomegaly. Mild left basilar airspace disease and small left pleural effusion.   No pneumothorax or subdiaphragmatic free air. No acute osseous abnormality identified. Small left pleural effusion with left basilar airspace disease, most likely atelectasis. PROCEDURES   Unless otherwise noted below, none     Procedures    CRITICAL CARE TIME   N/A    CONSULTS:  IP CONSULT TO HOSPITALIST      EMERGENCY DEPARTMENT COURSE and DIFFERENTIAL DIAGNOSIS/MDM:   Vitals:    Vitals:    08/18/20 1445 08/18/20 1600 08/18/20 1628 08/18/20 1649   BP: 138/86 112/77 124/79 130/81   Pulse: 88 89 93 93   Resp: 16 16 16 16   Temp:       TempSrc:       SpO2: 97% 98% 99% 95%   Weight:       Height:           Patient was given thefollowing medications:  Medications   morphine (PF) injection 2 mg (2 mg Intravenous Given 8/18/20 1303)   ondansetron (ZOFRAN) injection 4 mg (4 mg Intravenous Given 8/18/20 1302)   potassium chloride 10 mEq/100 mL IVPB (Peripheral Line) (0 mEq Intravenous Stopped 8/18/20 1702)   morphine (PF) injection 2 mg (2 mg Intravenous Given 8/18/20 1410)   potassium chloride (KLOR-CON M) extended release tablet 40 mEq (40 mEq Oral Given 8/18/20 1503)       2:38 PM EDT  Dr Sera Mckeon [facility] to [facility]. Patient is stable. She has elevated d-dimer suspicion possible PE she has CKD will need VQ scan. She also had perinephric hematoma discussed holding off on anticoagulation at this time due to her hematoma. FINAL IMPRESSION      1. Chest pain, unspecified type    2. Right flank pain    3. Abdominal pain, right upper quadrant    4. Traumatic perinephric hematoma of left kidney, subsequent encounter    5. Elevated d-dimer    6. Chronic renal failure, unspecified CKD stage    7. Hypokalemia          DISPOSITION/PLAN   DISPOSITION     PATIENT REFERREDTO:  No follow-up provider specified.     DISCHARGE MEDICATIONS:  Discharge Medication List as of 8/18/2020  5:03 PM          DISCONTINUED MEDICATIONS:  Discharge Medication List as of 8/18/2020  5:03 PM                 (Please note that portions ofthis note were completed with a voice recognition program.  Efforts were made to edit the dictations but occasionally words are mis-transcribed.)    Aurora Monte PA-C (electronically signed)           Kali Shaikh PA-C  08/18/20 0324

## 2020-08-18 NOTE — ED NOTES
Pt report given to [facility] [employer] team. Pt remains alert and oriented, no apparent distress. Pt's vitals remain WNL. Transport team denies further questions. Care transferred at this time. [facility] RN denies further questions. Pt transferred at this time.      Smitha Hodge RN  08/18/20 2402

## 2020-08-18 NOTE — ED PROVIDER NOTES
I have personally performed and/or participated in the history, exam and medical decision making and spent time face to face with the patient and agree with all pertinent clinical information. My history and physical revealed: This patient presented with right upper quadrant abdominal pain that began yesterday and has been constant since 8:00 last night. Patient had dinner has been eating normally had chili for dinner. Nothing unusual for her. She states he has had constant pain since 8:00 last night and that it does feel when she takes of breath that it makes it worse and makes the pain in her abdomen worse and it does at times feel so it radiates up through the right side of her chest to her shoulder. Patient states that it is not her heart pain. She has been constipated although started having small bowel movements each day 3 days ago. She has not had a bowel movement since her procedure on August 4. Her daughter states that it is not unusual for her to go a week at a time without a bowel movement and she did not consider this abnormal.  Her primary care had called in a CAT scan to make sure she did not have a bowel obstruction. Patient does have chronic renal disease. At one admission her creatinine had gone up above 4 but then had come back down. Patient was known to have left renal artery stenosis and underwent angiography for possible placement of a stent August 4 however had a traumatic perinephric hematoma that required embolization transfusion of blood and hospitalization. The patient has not been aware of any fevers earaches sore throat she states that she does feel nauseated she believes due to the pain. She has not had any vomiting. Patient believes her glucoses have been in the 140s. Exam-awake alert elderly obese female. She does not feel uncomfortable. Pupils are equal round and reactive TMs are clear mouth mucous members pink dry with no erythema no exudates.   Lungs are clear with good bilateral breath sounds no rales wheezes rhonchi retractions or stridor abdomen is obese soft she actually peers to skin brawny skin changes. She has right upper quadrant tenderness with guarding. She has a well-healed scar from a remote cholecystectomy years ago. Extremities have chronic wounds with mild erythema and symmetrical edema. Alert, nontoxic in appearance   Vitals viewed. I was present during the key portions of the examination and treatment of the patient. Case discussed with MLP. Concur with treatment and disposition.     Results for orders placed or performed during the hospital encounter of 08/18/20   CBC Auto Differential   Result Value Ref Range    WBC 10.4 4.0 - 11.0 K/uL    RBC 3.91 (L) 4.00 - 5.20 M/uL    Hemoglobin 10.0 (L) 12.0 - 16.0 g/dL    Hematocrit 31.6 (L) 36.0 - 48.0 %    MCV 80.9 80.0 - 100.0 fL    MCH 25.6 (L) 26.0 - 34.0 pg    MCHC 31.6 31.0 - 36.0 g/dL    RDW 23.9 (H) 12.4 - 15.4 %    Platelets 911 087 - 136 K/uL    MPV 8.6 5.0 - 10.5 fL    Neutrophils % 88.8 %    Lymphocytes % 4.1 %    Monocytes % 6.1 %    Eosinophils % 0.4 %    Basophils % 0.6 %    Neutrophils Absolute 9.3 (H) 1.7 - 7.7 K/uL    Lymphocytes Absolute 0.4 (L) 1.0 - 5.1 K/uL    Monocytes Absolute 0.6 0.0 - 1.3 K/uL    Eosinophils Absolute 0.0 0.0 - 0.6 K/uL    Basophils Absolute 0.1 0.0 - 0.2 K/uL   Comprehensive Metabolic Panel   Result Value Ref Range    Sodium 129 (L) 136 - 145 mmol/L    Potassium 2.5 (LL) 3.5 - 5.1 mmol/L    Chloride 80 (L) 99 - 110 mmol/L    CO2 34 (H) 21 - 32 mmol/L    Anion Gap 15 3 - 16    Glucose 280 (H) 70 - 99 mg/dL    BUN 62 (H) 7 - 20 mg/dL    CREATININE 1.3 (H) 0.6 - 1.1 mg/dL    GFR Non-African American 42 (A) >60    GFR  51 (A) >60    Calcium 9.3 8.3 - 10.6 mg/dL    Total Protein 7.8 6.4 - 8.2 g/dL    Alb 3.6 3.4 - 5.0 g/dL    Albumin/Globulin Ratio 0.9 (L) 1.1 - 2.2    Total Bilirubin 1.9 (H) 0.0 - 1.0 mg/dL    Alkaline Phosphatase 282 (H) 40 - 129 U/L    ALT 16 10 - 40 U/L    AST 16 15 - 37 U/L    Globulin 4.2 g/dL   APTT   Result Value Ref Range    aPTT 31.5 24.2 - 36.2 sec   Protime-INR   Result Value Ref Range    Protime 14.2 (H) 10.0 - 13.2 sec    INR 1.23 (H) 0.86 - 1.14   Urinalysis with Microscopic   Result Value Ref Range    Color, UA Yellow Straw/Yellow    Clarity, UA Clear Clear    Glucose, Ur Negative Negative mg/dL    Bilirubin Urine Negative Negative    Ketones, Urine Negative Negative mg/dL    Specific Gravity, UA 1.010 1.005 - 1.030    Blood, Urine Negative Negative    pH, UA 6.5 5.0 - 8.0    Protein, UA Negative Negative mg/dL    Urobilinogen, Urine 0.2 <2.0 E.U./dL    Nitrite, Urine Negative Negative    Leukocyte Esterase, Urine Negative Negative    Microscopic Examination Not Indicated     Urine Type NotGiven     WBC, UA 0-2 0 - 5 /HPF    RBC, UA 0-2 0 - 4 /HPF    Epithelial Cells, UA 0-1 0 - 5 /HPF    Bacteria, UA Rare (A) None Seen /HPF   Troponin   Result Value Ref Range    Troponin 0.02 (H) <0.01 ng/mL   Brain Natriuretic Peptide   Result Value Ref Range    Pro-BNP 5,283 (H) 0 - 124 pg/mL   Lipase   Result Value Ref Range    Lipase 89.0 (H) 13.0 - 60.0 U/L   D-dimer, quantitative   Result Value Ref Range    D-Dimer, Quant 1440 (H) 0 - 229 ng/mL DDU   EKG 12 Lead   Result Value Ref Range    Ventricular Rate 90 BPM    Atrial Rate 90 BPM    P-R Interval 232 ms    QRS Duration 138 ms    Q-T Interval 380 ms    QTc Calculation (Bazett) 464 ms    P Axis 66 degrees    R Axis -75 degrees    T Axis 87 degrees    Diagnosis       Sinus rhythm with 1st degree A-V blockPossible Left atrial enlargementLeft axis deviationNon-specific intra-ventricular conduction blockInferior infarct , age undeterminedAnterolateral infarct , age undeterminedAbnormal ECGNo previous ECGs available     CT ABDOMEN PELVIS WO CONTRAST   Preliminary Result   1.  Persistent left subcapsular/perinephric hematoma, not significantly   changed since August 4, 2020, measuring approximately 14.7 x 12.1 x 10.1 cm   (craniocaudal by AP by transverse). The hematoma is compressing the left   kidney. Hyperdense material in the left kidney probably represents   embolization coil or surgical clip. 2. Small left pleural effusion with left base compressive atelectasis. 3. Small amount of free fluid in the pelvis. 4. Circumferential subcutaneous edema of the abdomen and pelvic wall. XR CHEST PORTABLE   Final Result   Small left pleural effusion with left basilar airspace disease, most likely   atelectasis. EKG interpreted by Kaya Huynh MD:    Rhythm: normal sinus   Rate: 90  Ectopy: none  Conduction: right bundle branch block (incomplete) left anterior fascicular block  ST Segments: nonspecific changes  T Waves: non specific changes  Similar to EKG of 8/4/2020      /79   Pulse 91   Temp 97.9 °F (36.6 °C) (Oral)   Resp 16   Ht 5' 4\" (1.626 m)   Wt 250 lb (113.4 kg)   LMP 10/24/2012   SpO2 99%   BMI 42.91 kg/m²     12:33 PM EDT  Patient gives permission for family/companions to be present during questioning, answers and results during their emergency room visit. I discussed with the patient and her daughter a CAT scan without contrast.  This will be done as patient has known renal disease. They understand the possibility that she will require additional imaging with contrast if necessary. We discussed the limitations of the CAT scan without contrast.  We also discussed pain medications. Patient will undergo cardiac work-up she personally does not feel feels like her heart pain. Patient has exquisite right upper quadrant tenderness. She is status post cholecystectomy. She has no problems with any pain medications and has not had Suboxone since before her procedure on August 4. Her last Vicodin was at 5:00 this morning. If the patient requires admission she would like to be admitted to [facility].   2:06 PM EDT  She states her pain remains but has gone to an 8 to a 9 instead of a 10. I discussed with her all of her results including her markedly low potassium and we discussed possible admission and ruling out the possibility of a PE. Patient understands the benefit of a VQ scan the risks of anticoagulation and we discussed consulting the hospitalist at [facility]. She understands that a CT scan with contrast is potential to injure her kidneys and that at this time we will consult [facility] for the possibility of patient undergoing a VQ scan instead. Spoke with Dr. Lenin Bernstein and discussed symptoms, exam, objective data and clinical course. Disposition and plan agreed upon. He will admit the patient and give/enter admitting orders. All entries by Rody Enriquez are made while acting as a scribe for Abhishek Hernandez MD.    This dictation was generated by voice recognition computer software. Although all attempts are made to edit the dictation for accuracy, there may be errors in the transcription that are not intended.           Abhishek Hernandez MD  08/18/20 3046

## 2020-08-19 ENCOUNTER — APPOINTMENT (OUTPATIENT)
Dept: NUCLEAR MEDICINE | Age: 55
DRG: 182 | End: 2020-08-19
Attending: INTERNAL MEDICINE
Payer: COMMERCIAL

## 2020-08-19 LAB
ANION GAP SERPL CALCULATED.3IONS-SCNC: 13 MMOL/L (ref 3–16)
BUN BLDV-MCNC: 53 MG/DL (ref 7–20)
CALCIUM SERPL-MCNC: 9.4 MG/DL (ref 8.3–10.6)
CHLORIDE BLD-SCNC: 85 MMOL/L (ref 99–110)
CO2: 33 MMOL/L (ref 21–32)
CREAT SERPL-MCNC: 1.2 MG/DL (ref 0.6–1.1)
GFR AFRICAN AMERICAN: 56
GFR NON-AFRICAN AMERICAN: 46
GLUCOSE BLD-MCNC: 172 MG/DL (ref 70–99)
GLUCOSE BLD-MCNC: 182 MG/DL (ref 70–99)
GLUCOSE BLD-MCNC: 198 MG/DL (ref 70–99)
GLUCOSE BLD-MCNC: 200 MG/DL (ref 70–99)
GLUCOSE BLD-MCNC: 201 MG/DL (ref 70–99)
GLUCOSE BLD-MCNC: 212 MG/DL (ref 70–99)
HCT VFR BLD CALC: 30.1 % (ref 36–48)
HEMOGLOBIN: 9.8 G/DL (ref 12–16)
MAGNESIUM: 2.6 MG/DL (ref 1.8–2.4)
MCH RBC QN AUTO: 26.2 PG (ref 26–34)
MCHC RBC AUTO-ENTMCNC: 32.4 G/DL (ref 31–36)
MCV RBC AUTO: 80.9 FL (ref 80–100)
PDW BLD-RTO: 24.6 % (ref 12.4–15.4)
PERFORMED ON: ABNORMAL
PLATELET # BLD: 307 K/UL (ref 135–450)
PMV BLD AUTO: 8.5 FL (ref 5–10.5)
POTASSIUM SERPL-SCNC: 3.3 MMOL/L (ref 3.5–5.1)
RBC # BLD: 3.72 M/UL (ref 4–5.2)
SODIUM BLD-SCNC: 131 MMOL/L (ref 136–145)
TROPONIN: 0.02 NG/ML
WBC # BLD: 7.3 K/UL (ref 4–11)

## 2020-08-19 PROCEDURE — 6360000002 HC RX W HCPCS: Performed by: INTERNAL MEDICINE

## 2020-08-19 PROCEDURE — 2580000003 HC RX 258: Performed by: INTERNAL MEDICINE

## 2020-08-19 PROCEDURE — C8923 2D TTE W OR W/O FOL W/CON,CO: HCPCS

## 2020-08-19 PROCEDURE — 3430000000 HC RX DIAGNOSTIC RADIOPHARMACEUTICAL: Performed by: INTERNAL MEDICINE

## 2020-08-19 PROCEDURE — 36415 COLL VENOUS BLD VENIPUNCTURE: CPT

## 2020-08-19 PROCEDURE — 97165 OT EVAL LOW COMPLEX 30 MIN: CPT

## 2020-08-19 PROCEDURE — 83735 ASSAY OF MAGNESIUM: CPT

## 2020-08-19 PROCEDURE — 99255 IP/OBS CONSLTJ NEW/EST HI 80: CPT | Performed by: INTERNAL MEDICINE

## 2020-08-19 PROCEDURE — 85027 COMPLETE CBC AUTOMATED: CPT

## 2020-08-19 PROCEDURE — 84484 ASSAY OF TROPONIN QUANT: CPT

## 2020-08-19 PROCEDURE — 97116 GAIT TRAINING THERAPY: CPT

## 2020-08-19 PROCEDURE — 97161 PT EVAL LOW COMPLEX 20 MIN: CPT

## 2020-08-19 PROCEDURE — 78580 LUNG PERFUSION IMAGING: CPT

## 2020-08-19 PROCEDURE — 6360000002 HC RX W HCPCS: Performed by: NURSE PRACTITIONER

## 2020-08-19 PROCEDURE — 97530 THERAPEUTIC ACTIVITIES: CPT

## 2020-08-19 PROCEDURE — A9540 TC99M MAA: HCPCS | Performed by: INTERNAL MEDICINE

## 2020-08-19 PROCEDURE — 6370000000 HC RX 637 (ALT 250 FOR IP): Performed by: INTERNAL MEDICINE

## 2020-08-19 PROCEDURE — 1200000000 HC SEMI PRIVATE

## 2020-08-19 PROCEDURE — 80048 BASIC METABOLIC PNL TOTAL CA: CPT

## 2020-08-19 RX ADMIN — ATORVASTATIN CALCIUM 80 MG: 80 TABLET, FILM COATED ORAL at 21:39

## 2020-08-19 RX ADMIN — MAGNESIUM OXIDE TAB 400 MG (241.3 MG ELEMENTAL MG) 400 MG: 400 (241.3 MG) TAB at 08:30

## 2020-08-19 RX ADMIN — Medication 10 ML: at 05:14

## 2020-08-19 RX ADMIN — ASPIRIN 81 MG: 81 TABLET, COATED ORAL at 08:29

## 2020-08-19 RX ADMIN — BUSPIRONE HYDROCHLORIDE 30 MG: 5 TABLET ORAL at 08:30

## 2020-08-19 RX ADMIN — FUROSEMIDE 5 MG/HR: 10 INJECTION, SOLUTION INTRAMUSCULAR; INTRAVENOUS at 10:15

## 2020-08-19 RX ADMIN — MORPHINE SULFATE 2 MG: 2 INJECTION, SOLUTION INTRAMUSCULAR; INTRAVENOUS at 17:04

## 2020-08-19 RX ADMIN — Medication 10 ML: at 21:39

## 2020-08-19 RX ADMIN — CLOPIDOGREL BISULFATE 75 MG: 75 TABLET ORAL at 08:30

## 2020-08-19 RX ADMIN — MIDODRINE HYDROCHLORIDE 5 MG: 5 TABLET ORAL at 17:04

## 2020-08-19 RX ADMIN — PANTOPRAZOLE SODIUM 40 MG: 40 TABLET, DELAYED RELEASE ORAL at 08:30

## 2020-08-19 RX ADMIN — PANTOPRAZOLE SODIUM 40 MG: 40 TABLET, DELAYED RELEASE ORAL at 17:03

## 2020-08-19 RX ADMIN — TRAZODONE HYDROCHLORIDE 100 MG: 50 TABLET ORAL at 21:47

## 2020-08-19 RX ADMIN — MORPHINE SULFATE 4 MG: 4 INJECTION, SOLUTION INTRAMUSCULAR; INTRAVENOUS at 00:45

## 2020-08-19 RX ADMIN — INSULIN LISPRO 2 UNITS: 100 INJECTION, SOLUTION INTRAVENOUS; SUBCUTANEOUS at 13:31

## 2020-08-19 RX ADMIN — SPIRONOLACTONE 50 MG: 25 TABLET ORAL at 08:34

## 2020-08-19 RX ADMIN — POTASSIUM CHLORIDE 20 MEQ: 20 TABLET, EXTENDED RELEASE ORAL at 08:29

## 2020-08-19 RX ADMIN — MORPHINE SULFATE 2 MG: 2 INJECTION, SOLUTION INTRAMUSCULAR; INTRAVENOUS at 09:08

## 2020-08-19 RX ADMIN — MORPHINE SULFATE 4 MG: 4 INJECTION, SOLUTION INTRAMUSCULAR; INTRAVENOUS at 05:14

## 2020-08-19 RX ADMIN — INSULIN LISPRO 1 UNITS: 100 INJECTION, SOLUTION INTRAVENOUS; SUBCUTANEOUS at 21:42

## 2020-08-19 RX ADMIN — INSULIN LISPRO 4 UNITS: 100 INJECTION, SOLUTION INTRAVENOUS; SUBCUTANEOUS at 08:31

## 2020-08-19 RX ADMIN — BENZTROPINE MESYLATE 1 MG: 1 TABLET ORAL at 21:38

## 2020-08-19 RX ADMIN — LAMOTRIGINE 200 MG: 100 TABLET ORAL at 08:29

## 2020-08-19 RX ADMIN — INSULIN LISPRO 4 UNITS: 100 INJECTION, SOLUTION INTRAVENOUS; SUBCUTANEOUS at 17:04

## 2020-08-19 RX ADMIN — ARIPIPRAZOLE 10 MG: 10 TABLET ORAL at 08:30

## 2020-08-19 RX ADMIN — MORPHINE SULFATE 4 MG: 4 INJECTION, SOLUTION INTRAMUSCULAR; INTRAVENOUS at 21:38

## 2020-08-19 RX ADMIN — INSULIN GLARGINE 18 UNITS: 100 INJECTION, SOLUTION SUBCUTANEOUS at 21:46

## 2020-08-19 RX ADMIN — FERROUS SULFATE TAB 325 MG (65 MG ELEMENTAL FE) 325 MG: 325 (65 FE) TAB at 08:30

## 2020-08-19 RX ADMIN — MIDODRINE HYDROCHLORIDE 5 MG: 5 TABLET ORAL at 08:29

## 2020-08-19 RX ADMIN — BUSPIRONE HYDROCHLORIDE 30 MG: 5 TABLET ORAL at 21:39

## 2020-08-19 RX ADMIN — FERROUS SULFATE TAB 325 MG (65 MG ELEMENTAL FE) 325 MG: 325 (65 FE) TAB at 17:04

## 2020-08-19 RX ADMIN — LAMOTRIGINE 200 MG: 100 TABLET ORAL at 21:38

## 2020-08-19 RX ADMIN — Medication 3 MILLICURIE: at 10:55

## 2020-08-19 ASSESSMENT — PAIN DESCRIPTION - ONSET: ONSET: PROGRESSIVE

## 2020-08-19 ASSESSMENT — PAIN DESCRIPTION - PAIN TYPE
TYPE: ACUTE PAIN

## 2020-08-19 ASSESSMENT — PAIN SCALES - GENERAL
PAINLEVEL_OUTOF10: 9
PAINLEVEL_OUTOF10: 9
PAINLEVEL_OUTOF10: 10
PAINLEVEL_OUTOF10: 8
PAINLEVEL_OUTOF10: 10
PAINLEVEL_OUTOF10: 8
PAINLEVEL_OUTOF10: 10
PAINLEVEL_OUTOF10: 6
PAINLEVEL_OUTOF10: 6
PAINLEVEL_OUTOF10: 10
PAINLEVEL_OUTOF10: 10

## 2020-08-19 ASSESSMENT — PAIN DESCRIPTION - ORIENTATION
ORIENTATION: RIGHT;UPPER
ORIENTATION: RIGHT;MID
ORIENTATION: RIGHT;MID

## 2020-08-19 ASSESSMENT — PAIN DESCRIPTION - LOCATION
LOCATION: ABDOMEN
LOCATION: ABDOMEN
LOCATION: FLANK
LOCATION: ABDOMEN
LOCATION: ABDOMEN
LOCATION: FLANK

## 2020-08-19 ASSESSMENT — PAIN DESCRIPTION - FREQUENCY: FREQUENCY: CONTINUOUS

## 2020-08-19 ASSESSMENT — PAIN DESCRIPTION - DESCRIPTORS
DESCRIPTORS: RADIATING
DESCRIPTORS: CONSTANT

## 2020-08-19 ASSESSMENT — PAIN DESCRIPTION - DIRECTION: RADIATING_TOWARDS: CHEST

## 2020-08-19 NOTE — H&P
Hospital Medicine History & Physical      PCP: Lizet Garcia PA-C    Date of Admission: 8/18/2020    Date of Service: Pt seen/examined on 08/18/20 and Admitted to Inpatient with expected LOS greater than two midnights due to medical therapy. Chief Complaint:  RUQ pain    History Of Present Illness: The patient is a pleasant, chronically ill-appearing 62 Y F with a h/o COPD, HTN, HLD, DM2, CAD s/p stents and CABG, ischemic cardiomyopathy, and CHF. She has extensive vascular disease and is s/p a LUE bypass, a LLE angioplasty (then again when it reoccluded), and a RLE bypass. She continues to smoke. She was recently diagnosed with L renal artery stenosis and underwent a L renal artery angioplasty and stent here on 8/4. She was discharged home that day but returned to the ED a few hours later with L flank pain and was admitted with a L perinephric subcapsular hematoma. She required 2u pRBCs and a coil embolization of a L renal artery branch on 8/5. Her hospitalization was complicated by SHAUNNA and hyponatremia, and with the help of nephrology each of these issues improved a little by the time she was discharged on 8/12. Now the patient presents with new sharp RUQ pain which radiates to her R posterior flank, R chest, and R shoulder. It started around 8pm last night, and she denies having any pain in this area previously. It hurts her badly to breath or move. She is very tender in the RUQ. She is s/p cholecystectomy. Her LFTs have been newly abnormal for the last couple weeks.         Past Medical History:          Diagnosis Date    Acute kidney injury ([address] 12/25/2019    Acute on chronic congestive heart failure (HCC)     Alcohol dependence ([address] 3/10/2010    Bipolar 1 disorder ([address]     CAD (coronary artery disease)     Cardiomyopathy ([address] 06/29/2020    EF= 25%    Cellulitis 6/3/2020    CHF (congestive heart failure) (HCC)     COPD (chronic obstructive pulmonary disease) ([address]     Diabetic ulcer of left foot associated with type 2 diabetes mellitus ([facility] 1/18/2020    Diabetic ulcer of toe of right foot associated with type 2 diabetes mellitus ([facility] 1/18/2020    GERD (gastroesophageal reflux disease)     High cholesterol     HTN (hypertension)     Kidney disease, chronic, stage II (mild, EGFR 60+ ml/min)     MI (myocardial infarction) ([facility] 10/07/2019    NSTEMI    Positive FIT (fecal immunochemical test) 2/28/2020    Pulmonary nodule     PVD (peripheral vascular disease) ([facility]        Past Surgical History:          Procedure Laterality Date    ARTERIAL BYPASS SURGRY Left 01/06/2016    Axillary/Subclavian to Brachial Bypass w/reversed SVG    CARDIAC CATHETERIZATION  03/27/2018    Dr. Ginette Sanchez - at [facility]  01/20/2020    Dr. Mock File      pancreatitis post surgery    CORONARY ANGIOPLASTY WITH STENT PLACEMENT  03/16/2018    MELODY- 3.5 x 38 and 3.5 x 20 to Cx    CORONARY ANGIOPLASTY WITH STENT PLACEMENT  01/03/2018    MELODY- 3.0 x 38 and 2.75 x 26 and 2.75 x 8 and 2.75 x 22 to the LAD    CORONARY ANGIOPLASTY WITH STENT PLACEMENT  10/07/2019    MELODY- 2.5 x 8 to [address] GRAFT N/A 1/27/2020    CORONARY ARTERY BYPASS GRAFTING X 1, ON PUMP BEATING HEART, INTERNAL MAMMARY ARTERY TO LEFT ANTERIOR DESCENDING ARTERY, VAS CATH INSERTION, URI, PLATELET GEL APPLICATION, 5 LEVEL BILATERAL INTERCOSTAL NERVE BLOCK, STERNAL PLATING performed by Ana Regalado MD at [address] [place] [address] Right 5/16/2019    fem-pop, performed by Cate Blanton MD at [address]  02/14/2019    CardioMEMs insertion for CHF    KIDNEY SURGERY      ROTATOR CUFF REPAIR Left 04/22/2016    TONSILLECTOMY      TRANSESOPHAGEAL ECHOCARDIOGRAM  01/26/2020    TRANSLUMINAL ANGIOPLASTY Left 10/08/2019    with stenting, at [facility]    TRANSLUMINAL ANGIOPLASTY Left 04/29/2020    of the [facility] re-occlusion    TUMOR EXCISION      benign tumors X 2 chest & axilla    UPPER GASTROINTESTINAL ENDOSCOPY  4/25/2013    BX barretts, HH, gastritis    UPPER GASTROINTESTINAL ENDOSCOPY  12/10/2015    UPPER GASTROINTESTINAL ENDOSCOPY  01/06/2017    Gastritis    VASCULAR SURGERY Left 12/08/2015    upper extremity and mesenteric angiogram (diagnostic only)    VASCULAR SURGERY Bilateral 07/07/2020    diagnostic lower extremity angiogram only       Medications Prior to Admission:      Prior to Admission medications    Medication Sig Start Date End Date Taking? Authorizing Provider   insulin aspart (NOVOLOG FLEXPEN) 100 UNIT/ML injection pen Inject 10 Units into the skin 3 times daily (before meals) 8/14/20   Kavitha Motta APRN - CNP   HYDROcodone-acetaminophen (NORCO) 5-325 MG per tablet Take 1 tablet by mouth every 4 hours as needed for Pain for up to 7 days. Intended supply: 7 days. Take lowest dose possible to manage pain 8/13/20 8/20/20  Jeffery Pinon MD   torsemide [facility]) 100 MG tablet Take 1 tablet by mouth daily 100 mg daily except 50 mg twice weekly (Monday and Thursday).  8/12/20 9/11/20  Jaxon Mandujano MD   spironolactone (ALDACTONE) 50 MG tablet Take 1 tablet by mouth daily 8/13/20 9/12/20  Jaxon Mandujano MD   ferrous sulfate (IRON 325) 325 (65 Fe) MG tablet Take 1 tablet by mouth 2 times daily (with meals) 8/12/20 9/11/20  Jaxon Mandujano MD   midodrine (PROAMATINE) 5 MG tablet Take 1 tablet by mouth 2 times daily (with meals) 8/12/20 9/11/20  Jaxon Mandujano MD   potassium chloride (KLOR-CON M) 20 MEQ extended release tablet Take 1 tablet by mouth daily 8/12/20 9/11/20  Jaxon Mandujano MD   traZODone (DESYREL) 100 MG tablet Take 100 mg by mouth nightly as needed for Sleep Can take 1-2 tabs nightly    Historical Provider, MD   DOXEPIN HCL PO Take 1 capsule by mouth nightly as needed Patient unsure of exact dosage    Historical Provider, MD   albuterol sulfate  (90 Base) MCG/ACT inhaler Inhale 2 puffs into the lungs every 6 hours as needed for Wheezing or Shortness of Breath 7/3/20   Oneil Amezquita MD   Insulin Degludec (TRESIBA FLEXTOUCH) 100 UNIT/ML SOPN Inject 30 Units into the skin nightly 5/27/20   TRAY Whalen CNP   insulin lispro, 1 Unit Dial, 100 UNIT/ML SOPN Inject 10 Units into the skin 3 times daily 5/20/20   TRAY Whalen CNP   atorvastatin (LIPITOR) 80 MG tablet Take 1 tablet by mouth nightly 2/28/20   TRAY Louie CNP   tiotropium (SPIRIVA RESPIMAT) 2.5 MCG/ACT AERS inhaler INHALE 2 PUFFS INTO THE LUNGS DAILY 2/25/20   Eula Miller MD   buprenorphine-naloxone (SUBOXONE) 8-2 MG FILM SL film Place 1 Film under the tongue 2 times daily. Historical Provider, MD   benztropine (COGENTIN) 1 MG tablet Take 1 mg by mouth nightly  12/13/19   Historical Provider, MD   blood glucose test strips (EXACTECH TEST) strip 6 times daily.  12/19/19   TRAY Whalen CNP   Insulin Pen Needle (B-D ULTRAFINE III SHORT PEN) 31G X 8 MM MISC Five shots a day 12/3/19   TRAY Whalen CNP   INSULIN SYRINGE .5CC/29G 29G X 1/2\" 0.5 ML MISC 1 each by Does not apply route daily 12/3/19   TRAY hWalen CNP   Liraglutide (VICTOZA) 18 MG/3ML SOPN SC injection Inject 1.2 mg into the skin daily 10/29/19   TRAY Whalen CNP   pantoprazole (PROTONIX) 40 MG tablet Take 1 tablet by mouth 2 times daily 10/1/19   Tiffany Turner MD   clopidogrel (PLAVIX) 75 MG tablet TAKE 1 TABLET BY MOUTH EVERY DAY 9/3/19   TRAY Louie CNP   magnesium oxide (MAG-OX) 400 (240 Mg) MG tablet TAKE 1 TABLET ONCE DAILY 5/1/19   Marisela Gipson MD   busPIRone (BUSPAR) 30 MG tablet Take 30 mg by mouth 2 times daily  4/2/18   Historical Provider, MD   aspirin EC 81 MG EC tablet Take 1 tablet by mouth daily 1/8/16   Jeremi Hughes MD   ARIPiprazole (ABILIFY) 10 MG tablet Take 10 mg by mouth daily     Historical Provider, MD   lamoTRIgine (LAMICTAL) 150 MG tablet Take 200 mg by mouth 2 times daily     Historical Provider, MD       Allergies:  Lisinopril    Social History:      The patient currently lives at home    TOBACCO:   reports that she has been smoking cigarettes. She has a 15.00 pack-year smoking history. She has never used smokeless tobacco.  ETOH:   reports no history of alcohol use. E-Cigarettes Vaping or Juuling     Questions Responses    Vaping Use Never User    Start Date     Does device contain nicotine? Never    Quit Date     Vaping Type             Family History:      Reviewed in detail and negative for ESRD. Positive as follows:        Problem Relation Age of Onset    Heart Disease Maternal Grandmother     Cancer Mother         lung and brain cancer    Cancer Maternal Aunt         lung and brain cancer       REVIEW OF SYSTEMS:   Pertinent positives as noted in the HPI. All other systems reviewed and negative. PHYSICAL EXAM PERFORMED:    /85   Pulse 95   Temp 98.2 °F (36.8 °C) (Oral)   Resp 20   LMP 10/24/2012   SpO2 95%     General appearance:  No apparent distress, appears stated age and cooperative. HEENT:  Normal cephalic, atraumatic without obvious deformity. Pupils equal, round, and reactive to light. Extra ocular muscles intact. Conjunctivae/corneas clear. Neck: Supple, with full range of motion. No jugular venous distention. Trachea midline. Respiratory:  Normal respiratory effort. Bibasilar rales. No wheezing. Cardiovascular:  Regular rate and rhythm with normal S1/S2 without murmurs, rubs or gallops. Abdomen: Soft, RUQ tender, non-distended with normal bowel sounds. Musculoskeletal:  No clubbing, cyanosis. 2+ BLE pitting edema. Also edema of abdominal wall. Full range of motion without deformity. Skin: Skin color, texture, turgor normal.  No rashes or lesions. Neurologic:  Neurovascularly intact without any focal sensory/motor deficits.  Cranial nerves: II-XII intact, grossly non-focal.  Psychiatric:  Alert and oriented, thought content appropriate, normal insight  Capillary Refill: Brisk,< 3 seconds   Peripheral Pulses: +2 palpable, equal bilaterally       Labs:     Recent Labs     08/18/20  1200   WBC 10.4   HGB 10.0*   HCT 31.6*        Recent Labs     08/18/20  1200   *   K 2.5*   CL 80*   CO2 34*   BUN 62*   CREATININE 1.3*   CALCIUM 9.3     Recent Labs     08/18/20  1200   AST 16   ALT 16   BILITOT 1.9*   ALKPHOS 282*     Recent Labs     08/18/20  1200   INR 1.23*     Recent Labs     08/18/20  1200   TROPONINI 0.02*       Urinalysis:      Lab Results   Component Value Date    NITRU Negative 08/18/2020    WBCUA 0-2 08/18/2020    BACTERIA Rare 08/18/2020    RBCUA 0-2 08/18/2020    BLOODU Negative 08/18/2020    SPECGRAV 1.010 08/18/2020    GLUCOSEU Negative 08/18/2020       Radiology:     CXR: I have reviewed the CXR with the following interpretation: small L effusion, atelectasis  EKG:  I have reviewed the EKG with the following interpretation: nonspecific abnormalities    NM LUNG VENT/PERFUSION (VQ)    (Results Pending)       ASSESSMENT:    Active Hospital Problems    Diagnosis Date Noted    Right upper quadrant pain [R10.11] 08/18/2020         PLAN:    The patient is a pleasant, chronically ill-appearing 62 Y F with a h/o COPD, HTN, HLD, DM2, CAD s/p stents and CABG, ischemic cardiomyopathy, and CHF. She has extensive vascular disease and is s/p a LUE bypass, a LLE angioplasty (then again when it reoccluded), and a RLE bypass. She continues to smoke. She was recently diagnosed with L renal artery stenosis and underwent a L renal artery angioplasty and stent here on 8/4. She was discharged home that day but returned to the ED a few hours later with L flank pain and was admitted with a L perinephric subcapsular hematoma. She required 2u pRBCs and a coil embolization of a L renal artery branch on 8/5.   Her hospitalization was complicated by SHAUNNA and hyponatremia, and with the help of nephrology each of these issues improved a little by the time she was discharged on 8/12. Now the patient presents with new sharp RUQ pain which radiates to her R posterior flank, R chest, and R shoulder. It started around 8pm last night, and she denies having any pain in this area previously. It hurts her badly to breath or move. She is very tender in the RUQ. She is s/p cholecystectomy. Her LFTs have been newly abnormal for the last couple weeks. RUQ pain, suspect due to congestive hepatopathy, due to acute on chronic systolic CHF  - she had been on spironolactone and PRN metolazone. Then last admission she was discharged with spironolactone and daily torsemide. Started with furosemide gtt here. - no ACE/ARB/ARNI due to unstable renal status. Start BB if BP allows for this, but would first wean off her chronic midodrine if possible. - cardiology consulted to try and get CardioMEMs data    Other considerations:  - LFTs have somewhat of a cholestatic pattern. If the LFTs and pain don't improve, consider MRCP. Liver looked unremarkable on non-contrast CT.  - pleurisy is a consideration as well. Supportive care. She has no cough or fever, imaging just with LLL atelectasis. - there was mild R perinephric stranding on the CT. UA negative for infection or blood. Continue to reassess.   - [facility] ED considered the possibility of PE, and their D-dimer was quite high. PE seems less likely, but f/u V/Q.  - the mild lipase elevation was nonspecific.    - doubt ACS, troponin mildly elevated, trend. - she has chronic nonspecific pains and is on suboxone. CKD3  - baseline Cr is probably about 1.6, but she often sustains values closer to 2. Monitor during diuresis. Hyponatremia  - it looks like her baseline is in the high 120's, even when accounting for hyperglycemia. After a week of aggressive management, including multiple doses of tolvaptan, she still was only discharged at 126 last time.   Monitor response to diuresis and consider nephrology

## 2020-08-19 NOTE — PROGRESS NOTES
VSS - afebrile. Pt is alert and oriented x 4 with history of falls. Assessment completed as charted. Bed is in lowest position with 2/4 bed rails raised, wheels locked and call light within reach - patient wearing non-skid socks and verbalizes understanding to call out for assistance. No further requests at this time. Will continue to monitor.    Vitals:    08/18/20 2315   BP: (!) 144/90   Pulse: 98   Resp: 16   Temp: 97.7 °F (36.5 °C)   SpO2: 96%

## 2020-08-19 NOTE — CARE COORDINATION
The patient is a 62 Y F with a h/o COPD, HTN, HLD, DM2, CAD s/p stents and CABG, ischemic cardiomyopathy, and CHF. She has extensive vascular disease and is s/p a LUE bypass, a LLE angioplasty (then again when it reoccluded), and a RLE bypass. She continues to smoke. She was recently diagnosed with L renal artery stenosis and underwent a L renal artery angioplasty and stent here on 8/4. She was discharged home that day but returned to the ED a few hours later with L flank pain and was admitted with a L perinephric subcapsular hematoma. She required 2u pRBCs and a coil embolization of a L renal artery branch on 8/5. Her hospitalization was complicated by SHAUNNA and hyponatremia, and with the help of nephrology each of these issues improved a little by the time she was discharged on 8/12. Readmitted 8/18 with upper quadrant pain and S/S CHF.

## 2020-08-19 NOTE — PROGRESS NOTES
Comprehensive Nutrition Assessment    Type and Reason for Visit:  Initial, Positive Nutrition Screen(wound)    Nutrition Recommendations/Plan:   1. Continue carb control, no added salt, 2000 mL FR   2. Add Glucerna daily   3. Encourage PO intakes and high protein  4. Encourage diet compliance and healthy lifestyle   5. Monitor nutrition adequacy, pertinent labs, bowel habits, wt changes, and clinical progress    Nutrition Assessment:  Pt admitted with RUQ pain, PMH of COPD, CAD with stenting and CABG, CHF and DM (A1c of 7.2 on 6/3/20 and BG elevated this admission). Plans for stress test tomorrow. Pt nutritionally at risk AEB wound. Pt reports good appetite and PO intakes now and PTA. Pt reports weight gain, likely r/t CHF. Pt favorable to adding ONS for wound healing. Discussed carb control diet. Encouraged carb counting and monitoring BG throughout the day. Pt reports monitoring BG and carbs at home. Pt recpetive to information. Will continue to monitor. Malnutrition Assessment:  Malnutrition Status: At risk for malnutrition (Comment)      Estimated Daily Nutrient Needs:  Energy (kcal):  9277-4121 kcal; Weight Used for Energy Requirements:  Ideal(56 kg)     Protein (g):  56-68 g; Weight Used for Protein Requirements:  Ideal(1.0-1.2 g/kg)        Fluid (ml/day):  1 ml/kcal; Weight Used for Fluid Requirements:  [place]      Nutrition Related Findings:  -1.6 L since admission, +2 BLE edema, active BS, MG elevated, hypokalemic and hyponatremic      Wounds:  Pressure Ulcer, Stage II, Diabetic Ulcer       Current Nutrition Therapies:    DIET CARB CONTROL; No Added Salt (3-4 GM);  Daily Fluid Restriction: 2000 ml  Diet NPO Time Specified    Anthropometric Measures:  · Height: 5' 4.5\" (163.8 cm)  · Current Body Weight: 252 lb (114.3 kg)   · Usual Body Weight: (235-237 lb per pt)     · Ideal Body Weight: 123 lbs; % Ideal Body Weight 204.9 %   · BMI: 42.6  · BMI Categories: Obese Class 3 (BMI 40.0 or greater)

## 2020-08-19 NOTE — PROGRESS NOTES
RN paged MD    No AM labs ordered. The patient's Mag and K was low on admission and she is on a Lasix gtt as well. Will watch for orders. Thank you!

## 2020-08-19 NOTE — PROGRESS NOTES
Paged NP:     I am paging in regards to Mrs. Anuj Choi in room 360. Previous RN, Tamiko Echavarria has left for the day, I received report and noticed that the patient's Magnesium level was low at 1.6 this AM. Would you like to replace the Mag? Thank you! NP replied:    Im not going to replace it because of her renal failure.

## 2020-08-19 NOTE — CARE COORDINATION
CASE MANAGEMENT INITIAL ASSESSMENT      Reviewed chart and completed assessment with patient at bedside    Explained Case Management role/services. Primary contact information: meme Shah  [phone]    Admit date/status: inpatient    Diagnosis: RUQ pain    Is this a Readmission?:  Yes. Recent vascular surgery followed by bleed/readmission with embolixation and transfusions. Now adm for RUQ pin and CHF. Insurance: [employer] required for SNF - Y      3 night stay required - N    Living arrangements, Adls, care needs, prior to admission:   Lives in mobile home with her daughter and a friend. 4 steps at entry. Transportation: self    Durable Medical Equipment at home: Walker_x_Cane__RTS__ BSC__Shower Chair__  02__ HHN__ CPAP__  BiPap__  Hospital Bed__ W/C___ Other__________    Services in the home and/or outpatient, prior to admission: none    Dialysis Facility (if applicable)   · Name:  · Address:  · Dialysis Schedule:  · Phone:  · Fax:    PT/OT recs: not ordered    Hospital Exemption Notification (HEN): if SNF    Barriers to discharge: none    Plan/comments: Plans dc home.  Denies further needs    ECOC on chart for MD signature  Yes      Musa Lynn RN

## 2020-08-19 NOTE — PROGRESS NOTES
Hospitalist Progress Note      PCP: Brandie Avitia PA-C    Date of Admission: 8/18/2020    Chief Complaint: RUQ abdominal pain    Hospital Course: see H&P     Subjective:   Patient is up in bed, comfortable. Not in distress. Denies any chest pain or SOB. No new event overnight.       Medications:  Reviewed    Infusion Medications    furosemide (LASIX) 1mg/ml infusion 5 mg/hr (08/19/20 1015)    dextrose       Scheduled Medications    ARIPiprazole  10 mg Oral Daily    aspirin EC  81 mg Oral Daily    benztropine  1 mg Oral Nightly    buprenorphine-naloxone  1 Film Sublingual BID    clopidogrel  75 mg Oral Daily    ferrous sulfate  325 mg Oral BID WC    lamoTRIgine  200 mg Oral BID    magnesium oxide  400 mg Oral Daily    midodrine  5 mg Oral BID WC    pantoprazole  40 mg Oral BID    potassium chloride  20 mEq Oral Daily    spironolactone  50 mg Oral Daily    tiotropium  2 puff Inhalation Daily    insulin lispro  0-12 Units Subcutaneous TID WC    insulin lispro  0-6 Units Subcutaneous Nightly    busPIRone  30 mg Oral BID    insulin glargine  18 Units Subcutaneous Nightly    atorvastatin  80 mg Oral Nightly    sodium chloride flush  10 mL Intravenous 2 times per day    potassium chloride  10 mEq Intravenous Once     PRN Meds: perflutren lipid microspheres, regadenoson, traMADol, traZODone, glucose, dextrose, glucagon (rDNA), dextrose, morphine **OR** morphine, sodium chloride flush, acetaminophen **OR** acetaminophen, polyethylene glycol, albuterol      Intake/Output Summary (Last 24 hours) at 8/19/2020 1140  Last data filed at 8/19/2020 0509  Gross per 24 hour   Intake 360 ml   Output 1575 ml   Net -1215 ml       Physical Exam Performed:    /85   Pulse 95   Temp 98 °F (36.7 °C) (Oral)   Resp 14   Ht 5' 4.5\" (1.638 m)   Wt 252 lb 3.2 oz (114.4 kg)   LMP 10/24/2012   SpO2 95%   BMI 42.62 kg/m²     General appearance: No apparent distress, appears stated age and quadrant pain [R10.11]     The patient is a pleasant, chronically ill-appearing 62 Y F with a h/o COPD, HTN, HLD, DM2, CAD s/p stents and CABG, ischemic cardiomyopathy, and CHF. She has extensive vascular disease and is s/p a LUE bypass, a LLE angioplasty (then again when it reoccluded), and a RLE bypass. She continues to smoke. She was recently diagnosed with L renal artery stenosis and underwent a L renal artery angioplasty and stent here on 8/4. She was discharged home that day but returned to the ED a few hours later with L flank pain and was admitted with a L perinephric subcapsular hematoma. She required 2u pRBCs and a coil embolization of a L renal artery branch on 8/5. Her hospitalization was complicated by SHAUNNA and hyponatremia, and with the help of nephrology each of these issues improved a little by the time she was discharged on 8/12. Now the patient presents with new sharp RUQ pain which radiates to her R posterior flank, R chest, and R shoulder. It started around 8pm last night, and she denies having any pain in this area previously. It hurts her badly to breath or move. She is very tender in the RUQ. She is s/p cholecystectomy. Her LFTs have been newly abnormal for the last couple weeks.        RUQ pain, suspect due to congestive hepatopathy, due to acute on chronic systolic CHF  - she had been on spironolactone and PRN metolazone. Then last admission she was discharged with spironolactone and daily torsemide. Started with furosemide gtt here. - no ACE/ARB/ARNI due to unstable renal status. Start BB if BP allows for this, but would first wean off her chronic midodrine if possible. - cardiology consulted to try and get CardioMEMs data     Other considerations:  - LFTs have somewhat of a cholestatic pattern. If the LFTs and pain don't improve, consider MRCP. Liver looked unremarkable on non-contrast CT.  - pleurisy is a consideration as well. Supportive care.   She has no cough or fever, imaging just with LLL atelectasis. - there was mild R perinephric stranding on the CT. UA negative for infection or blood. Continue to reassess.   - [facility] considered the possibility of PE, and their D-dimer was quite high. PE seems less likely, but f/u V/Q.  - the mild lipase elevation was nonspecific.    - doubt ACS, troponin mildly elevated, trend. - she has chronic nonspecific pains and is on suboxone.      CKD3  - baseline Cr is probably about 1.6, but she often sustains values closer to 2. Monitor during diuresis. Consult Nephrology.     Hyponatremia  - it looks like her baseline is in the high 120's, even when accounting for hyperglycemia. After a week of aggressive management, including multiple doses of tolvaptan, she still was only discharged at 126 last time. Monitor response to diuresis and consider nephrology consultation again if this isn't improving.       CAD  - aspirin, clopidogrel. OK to continue statin.     DM2  - adjusted insulin regimen while here. Recent A1c 7.2.     14.7 cm L perinephric subcapsular hematoma was stable on repeat CT on this admission. Monitor. DVT Prophylaxis: SCD  Diet: DIET CARB CONTROL; No Added Salt (3-4 GM);  Daily Fluid Restriction: 2000 ml  Diet NPO Time Specified  Code Status: Full Code    PT/OT Eval Status: Not ordered    Dispo - in 3-5 days    Ruby Santana MD

## 2020-08-19 NOTE — CONSULTS
[address] [id number] Continence Nurse  Consult Note       NAME:  Louise Bautista  MEDICAL RECORD NUMBER:  [id number]  AGE: 62 y.o. GENDER: female  : 1965  TODAY'S DATE:  2020    Subjective: Right leg is not as good as the left. Reason for WOCN Evaluation and Assessment: BLE venous ulcers and diabetic ulcers on left foot      Mayo Horne is a 62 y.o. female referred by:   [x] Physician  [] Nursing  [] Other:     Wound Identification:  Wound Type: venous and diabetic  Contributing Factors: venous stasis, diabetes, chronic pressure, decreased mobility and obesity    Wound History: The patient is a pleasant, chronically ill-appearing 62 Y F with a h/o COPD, HTN, HLD, DM2, CAD s/p stents and CABG, ischemic cardiomyopathy, and CHF. She has extensive vascular disease and is s/p a LUE bypass, a LLE angioplasty (then again when it reoccluded), and a RLE bypass. She continues to smoke. She was recently diagnosed with L renal artery stenosis and underwent a L renal artery angioplasty and stent here on . She was discharged home that day but returned to the ED a few hours later with L flank pain and was admitted with a L perinephric subcapsular hematoma. She required 2u pRBCs and a coil embolization of a L renal artery branch on . Her hospitalization was complicated by SHAUNNA and hyponatremia, and with the help of nephrology each of these issues improved a little by the time she was discharged on . Now the patient presents with new sharp RUQ pain which radiates to her R posterior flank, R chest, and R shoulder. It started around 8pm last night, and she denies having any pain in this area previously. It hurts her badly to breath or move. She is very tender in the RUQ. She is s/p cholecystectomy. Her LFTs have been newly abnormal for the last couple weeks.   Current Wound Care Treatment: L Foot Lateral and L 2nd Toe - betadine; BLE - ace wraps    Patient Goal of Care:  [x] Wound Healing  [] Odor Control  [] Palliative Care  [] Pain Control   [] Other:         PAST MEDICAL HISTORY        Diagnosis Date    Acute kidney injury ([facility] 12/25/2019    Acute on chronic congestive heart failure (HCC)     Alcohol dependence ([facility] 3/10/2010    Bipolar 1 disorder ([facility]     CAD (coronary artery disease)     Cardiomyopathy ([facility] 06/29/2020    EF= 25%    Cellulitis 6/3/2020    CHF (congestive heart failure) ([facility])     COPD (chronic obstructive pulmonary disease) ([facility])     Diabetic ulcer of left foot associated with type 2 diabetes mellitus ([facility] 1/18/2020    Diabetic ulcer of toe of right foot associated with type 2 diabetes mellitus ([facility] 1/18/2020    GERD (gastroesophageal reflux disease)     High cholesterol     HTN (hypertension)     Kidney disease, chronic, stage II (mild, EGFR 60+ ml/min)     MI (myocardial infarction) ([facility] 10/07/2019    NSTEMI    Positive FIT (fecal immunochemical test) 2/28/2020    Pulmonary nodule     PVD (peripheral vascular disease) ([facility]        PAST SURGICAL HISTORY    Past Surgical History:   Procedure Laterality Date    ARTERIAL BYPASS SURGRY Left 01/06/2016    Axillary/Subclavian to Brachial Bypass w/reversed SVG    CARDIAC CATHETERIZATION  03/27/2018    [facility]  01/20/2020    Dr. Brooklyn Gilmore      pancreatitis post surgery    CORONARY ANGIOPLASTY WITH STENT PLACEMENT  03/16/2018    MELODY- 3.5 x 38 and 3.5 x 20 to Cx    CORONARY ANGIOPLASTY WITH STENT PLACEMENT  01/03/2018    MELODY- 3.0 x 38 and 2.75 x 26 and 2.75 x 8 and 2.75 x 22 to the LAD    CORONARY ANGIOPLASTY WITH STENT PLACEMENT  10/07/2019    MELODY- 2.5 x 8 to mLAD    CORONARY ARTERY BYPASS GRAFT N/A 1/27/2020    CORONARY ARTERY BYPASS GRAFTING X 1, ON PUMP BEATING HEART, INTERNAL MAMMARY ARTERY TO LEFT ANTERIOR DESCENDING ARTERY, VAS CATH INSERTION, URI, PLATELET GEL APPLICATION, 5 LEVEL BILATERAL INTERCOSTAL NERVE BLOCK, STERNAL PLATING performed by Jose Curtis MD at [address] Right 2019    fem-pop, performed by Evelio Mojica MD at [address]  2019    CardioMEMs insertion for CHF    KIDNEY SURGERY      ROTATOR CUFF REPAIR Left 2016    TONSILLECTOMY      TRANSESOPHAGEAL ECHOCARDIOGRAM  2020    TRANSLUMINAL ANGIOPLASTY Left 10/08/2019    with stenting, at [facility]    TRANSLUMINAL ANGIOPLASTY Left 2020    of the [facility] re-occlusion    TUMOR EXCISION      benign tumors X 2 chest & axilla    UPPER GASTROINTESTINAL ENDOSCOPY  2013    BX barretts, HH, gastritis    UPPER GASTROINTESTINAL ENDOSCOPY  12/10/2015    UPPER GASTROINTESTINAL ENDOSCOPY  2017    Gastritis    VASCULAR SURGERY Left 2015    upper extremity and mesenteric angiogram (diagnostic only)    VASCULAR SURGERY Bilateral 2020    diagnostic lower extremity angiogram only       FAMILY HISTORY    Family History   Problem Relation Age of Onset    Heart Disease Maternal Grandmother     Cancer Mother         lung and brain cancer    Cancer Maternal Aunt         lung and brain cancer       SOCIAL HISTORY    Social History     Tobacco Use    Smoking status: Current Every Day Smoker     Packs/day: 0.50     Years: 30.00     Pack years: 15.00     Types: Cigarettes     Last attempt to quit: 2019     Years since quittin.0    Smokeless tobacco: Never Used    Tobacco comment: trying to quit, one cigarette daily   Substance Use Topics    Alcohol use: No     Comment: quit      Drug use: Never       ALLERGIES    Allergies   Allergen Reactions    Lisinopril Other (See Comments)     Severe hypotension       MEDICATIONS    No current facility-administered medications on file prior to encounter.       Current Outpatient Medications on File Prior to Encounter   Medication Sig Dispense Refill    insulin aspart (NOVOLOG FLEXPEN) 100 UNIT/ML injection INSULIN SYRINGE .5CC/29G 29G X 1/2\" 0.5 ML MISC 1 each by Does not apply route daily 100 each 3    Liraglutide (VICTOZA) 18 MG/3ML SOPN SC injection Inject 1.2 mg into the skin daily 2 pen 3    pantoprazole (PROTONIX) 40 MG tablet Take 1 tablet by mouth 2 times daily 60 tablet 0    clopidogrel (PLAVIX) 75 MG tablet TAKE 1 TABLET BY MOUTH EVERY DAY 30 tablet 5    magnesium oxide (MAG-OX) 400 (240 Mg) MG tablet TAKE 1 TABLET ONCE DAILY 30 tablet 11    busPIRone (BUSPAR) 30 MG tablet Take 30 mg by mouth 2 times daily       aspirin EC 81 MG EC tablet Take 1 tablet by mouth daily 30 tablet 3    ARIPiprazole (ABILIFY) 10 MG tablet Take 10 mg by mouth daily       lamoTRIgine (LAMICTAL) 150 MG tablet Take 200 mg by mouth 2 times daily          Objective: A/O; lying in bed; F/C    /69   Pulse 91   Temp 97.4 °F (36.3 °C) (Oral)   Resp 20   Ht 5' 4.5\" (1.638 m)   Wt 252 lb 3.2 oz (114.4 kg)   LMP 10/24/2012   SpO2 95%   BMI 42.62 kg/m²     LABS:  WBC:    Lab Results   Component Value Date    WBC 7.3 08/19/2020     H/H:    Lab Results   Component Value Date    HGB 9.8 08/19/2020    HCT 30.1 08/19/2020     PTT:    Lab Results   Component Value Date    APTT 31.5 08/18/2020   [APTT}  PT/INR:    Lab Results   Component Value Date    PROTIME 14.2 08/18/2020    INR 1.23 08/18/2020     HgBA1c:    Lab Results   Component Value Date    LABA1C 7.2 06/03/2020       Assessment: L Foot Lateral and L 2nd Toe - dry and intact, brown wound bed  BLE - scattered venous ulcers dry and intact, yellow; no weeping noted   Vivek Risk Score: Vivek Scale Score: 19    Patient Active Problem List   Diagnosis Code    Type 2 diabetes mellitus with diabetic polyneuropathy, with long-term current use of insulin ([facility]) E11.42, Z79.4    Essential hypertension I10    CLINT (obstructive sleep apnea) G47.33    Tobacco abuse disorder Z72.0    PAD (peripheral artery disease) ([facility]) I73.9    GERD (gastroesophageal reflux disease) K21.9    Anxiety and depression F41.9, F32.9    Hyponatremia E87.1    Iron deficiency anemia D50.9    CAD (coronary artery disease) I25.10    Mitral valve regurgitation I34.0    Stage 3 chronic kidney disease (HCC) N18.3    Hypokalemia E87.6    Claudication in peripheral vascular disease (HCC) I73.9    COPD (chronic obstructive pulmonary disease) (HCC) J44.9    Vitamin D deficiency E55.9    Acute on chronic systolic congestive heart failure (HCC) I50.23    Diabetic ulcer of left foot associated with type 2 diabetes mellitus, limited to breakdown of skin (HCC) E11.621, L97.521    Dyslipidemia E78.5    Abel's esophagus K22.70    Viral hepatitis C B19.20    Pulmonary nodule R91.1    Class 2 severe obesity due to excess calories with serious comorbidity and body mass index (BMI) of 39.0 to 39.9 in adult ([facility]) E66.01, Z68.39    Bipolar disorder ([facility]) F31.9    Hypotension - chronic I95.9    Pulmonary hypertension ([facility]) I27.20    Bilateral lower extremity edema R60.0    Habitual self-excoriation F42.4    Ulcer of left lower leg, limited to breakdown of skin (HCC) L97.921    Ulcer of right leg, limited to breakdown of skin ([address] L97.911    Arthritis M19.90    Cardiomyopathy ([facility]) I42.9    Chronic systolic heart failure (HCC) I50.22    Acute on chronic renal insufficiency N28.9, N18.9    Peripheral venous insufficiency I87.2    Diabetic polyneuropathy associated with type 2 diabetes mellitus (HCC) E11.42    Atherosclerosis of native artery of both lower extremities with bilateral ulceration of calves ([facility]) I70.232, I70.242    Left renal artery stenosis ([facility]) I70.1    Traumatic perinephric hematoma, left, initial encounter S37.092A    Aortocoronary bypass status Z95.1    Acute kidney injury superimposed on CKD (HCC) N17.9, N18.9    Leukocytosis D72.829    Acute blood loss anemia D62    DM (diabetes mellitus), secondary uncontrolled (HCC) E13.65    Morbid obesity with BMI of 40.0-44.9, adult ([facility] E66.01, Z68.41    Multiple wounds of skin R23.8    Right upper quadrant pain R10.11       Measurements:  Wound 01/17/20 Toe (Comment  which one) Anterior; Left Closed brown plantar 3rd toe (Active)   Number of days: 214       Wound 01/20/20 Toe (Comment  which one) Anterior; Left 1st dorsal toe lateral edge of nail, brown & dry (Active)   Number of days: 212       Wound 03/10/20 Toe (Comment  which one) Anterior; Left Unstageable (Active)   Number of days: 162       Wound 03/27/20 Foot Right;Plantar (Active)   Number of days: 144       Wound 04/27/20 Chest Right;Upper (Active)   Number of days: 113       Wound 06/18/20 #1, left lower leg cluster, venous, partial thickness, onset 6/1/2020 (Active)   Wound Image   08/19/20 1352   Wound Venous 08/19/20 1352   Dressing Status Clean;Dry; Intact 08/19/20 1352   Dressing Changed Changed/New 08/05/20 1718   Dressing/Treatment Ace wrap 08/19/20 1352   Wound Cleansed Rinsed/Irrigated with saline 08/05/20 1718   Dressing Change Due 08/06/20 08/07/20 0800   Wound Length (cm) 4 cm 08/05/20 1718   Wound Width (cm) 1.5 cm 08/05/20 1718   Wound Depth (cm) 0 cm 08/05/20 1718   Wound Surface Area (cm^2) 6 cm^2 08/05/20 1718   Change in Wound Size % (l*w) 98.6 08/05/20 1718   Wound Volume (cm^3) 0 cm^3 08/05/20 1718   Wound Healing % 100 08/05/20 1718   Post-Procedure Length (cm) 1 cm 07/23/20 1315   Post-Procedure Width (cm) 0.6 cm 07/23/20 1315   Post-Procedure Depth (cm) 0.1 cm 07/23/20 1315   Post-Procedure Surface Area (cm^2) 0.6 cm^2 07/23/20 1315   Post-Procedure Volume (cm^3) 0.06 cm^3 07/23/20 1315   Distance Tunneling (cm) 0 cm 07/23/20 1302   Undermining Maxium Distance (cm) 0 07/23/20 1302   Wound Assessment Dry; Intact; Yellow 08/19/20 1352   Drainage Amount None 08/19/20 1352   Drainage Description Serous 07/30/20 1308   Odor None 08/19/20 1352   Margins Attached edges 08/19/20 1352   Shaye-wound Assessment Dry;Yellow-brown; Intact 08/19/20 1352   [place]%Wound Bed 45 swabs 08/19/20 1352   Wound Cleansed Rinsed/Irrigated with saline 08/05/20 1718   Dressing Change Due 08/06/20 08/07/20 0800   Wound Length (cm) 1.8 cm 08/19/20 1352   Wound Width (cm) 0.5 cm 08/19/20 1352   Wound Depth (cm) 0 cm 08/19/20 1352   Wound Surface Area (cm^2) 0.9 cm^2 08/19/20 1352   Change in Wound Size % (l*w) -80 08/19/20 1352   Wound Volume (cm^3) 0 cm^3 08/19/20 1352   Wound Healing % 100 08/19/20 1352   Distance Tunneling (cm) 0 cm 07/23/20 1302   Undermining Maxium Distance (cm) 0 07/23/20 1302   Wound Assessment Dry; Intact; Maximus Sands 08/19/20 1352   Drainage Amount None 08/19/20 1352   Odor None 08/19/20 1352   Margins Attached edges 08/19/20 1352   Shaye-wound Assessment Dry; Intact 08/19/20 1352   Other%Wound Bed 100 08/19/20 1352   Culture Taken No 08/19/20 1352   Number of days: 34    L 2nd Toe:         Wound 07/30/20 #4, Left lateral foot, diabetic foot ulcer, Scott 1, Onset 7/25/20 (Active)   Wound Image   08/19/20 1352   Wound Diabetic 08/19/20 1352   Dressing Status Dry; Intact 08/19/20 1352   Dressing Changed Changed/New 08/05/20 1718   Dressing/Treatment Betadine swabs 08/19/20 1352   Wound Cleansed Rinsed/Irrigated with saline 08/05/20 1718   Dressing Change Due 08/20/20 08/19/20 1352   Wound Length (cm) 1 cm 08/19/20 1352   Wound Width (cm) 1.2 cm 08/19/20 1352   Wound Depth (cm) 0 cm 08/19/20 1352   Wound Surface Area (cm^2) 1.2 cm^2 08/19/20 1352   Change in Wound Size % (l*w) -20 08/19/20 1352   Wound Volume (cm^3) 0 cm^3 08/19/20 1352   Wound Healing % 100 08/19/20 1352   Wound Assessment Dry; Intact; Maximus Sands 08/19/20 1352   Drainage Amount None 08/19/20 1352   Odor None 08/19/20 1352   Margins Attached edges 08/19/20 1352   Shaye-wound Assessment Dry; Intact 08/19/20 1352   Other%Wound Bed 100 08/19/20 1352   Culture Taken No 08/19/20 1352   Number of days: 20    L Foot Lateral:         Wound 08/05/20 Buttocks Inner;Left (Active)   Wound Image   08/05/20 0300   Wound Pressure Stage  2 08/18/20 2218   Dressing Status Clean;Dry; Intact 08/12/20 0800   Dressing/Treatment Open to air 08/19/20 1238   Dressing Change Due 08/06/20 08/07/20 0800   Wound Assessment Clean;Dry; Intact 08/19/20 1238   Drainage Amount None 08/19/20 1238   Odor None 08/19/20 1238   Margins Attached edges 08/19/20 1238   Shaye-wound Assessment Pink 08/19/20 1238   Number of days: 14     Incision 01/27/20 Sternum (Active)   Number of days: 205       Incision 03/10/20 Sternum (Active)   Number of days: 162     Response to treatment:  Well tolerated by patient. Pain Assessment:  Severity:  0 / 10  Quality of pain: N/A  Wound Pain Timing/Severity: none  Premedicated: No    Plan   Plan of Care: Wound 08/05/20 Buttocks Inner;Left-Dressing/Treatment: Open to air  Wound 07/30/20 #4, Left lateral foot, diabetic foot ulcer, Scott 1, Onset 7/25/20-Dressing/Treatment: Betadine swabs  Wound 07/16/20 #3, left second toe, DFU, scott 1, onset 5/1/2020-Dressing/Treatment: Betadine swabs  Wound 06/18/20 #1, left lower leg cluster, venous, partial thickness, onset 6/1/2020-Dressing/Treatment: Ace wrap  Wound 06/18/20 #2, right lower leg cluster, venous, full thickness, onset 6/1/2020-Dressing/Treatment: ABD   Recommendation: L Foot Lateral and L 2nd Toe - paint with betadine daily  Bilateral Lower Extremities - wrap with ace wrap from toe to knees; elevate legs while bed and sitting up in chair  Buttocks - foam dressing  Call Wound Care for deterioration [phone]; pager [phone]    Specialty Bed Required : No   [] Low Air Loss   [] Pressure Redistribution  [] Fluid Immersion  [] Bariatric  [] Total Pressure Relief  [] Other:     Current Diet: DIET CARB CONTROL; No Added Salt (3-4 GM);  Daily Fluid Restriction: 2000 ml  Diet NPO Time Specified  Dietician consult:  No    Discharge Plan:  Placement for patient upon discharge: home with support    Patient appropriate for Outpatient [address]: Yes current patient     Referrals:  [x]   [] [facility]  [] Supplies  [] Other    Patient/Caregiver Teaching:  Level of patient/caregiver understanding able to:   [] Indicates understanding       [] Needs reinforcement  [] Unsuccessful      [] Verbal Understanding  [] Demonstrated understanding       [] No evidence of learning  [] Refused teaching         [] N/A       Electronically signed by Tyler Coburn RN, CWOCN on 8/19/2020 at 1:57 PM

## 2020-08-19 NOTE — PROGRESS NOTES
Paged MD:    Patient's potassium was 3.3 this morning. There are no PRN orders for potassium replacement. Patient states they can not tolerate IV potassium. Would you like to order a prn potassium replacement? Thank you. Will watch for orders.

## 2020-08-19 NOTE — PLAN OF CARE
Problem: Nutrition  Goal: Optimal nutrition therapy  Outcome: Ongoing  Note: Nutrition Problem #1: Increased nutrient needs  Intervention: Food and/or Nutrient Delivery: Continue Current Diet, Start Oral Nutrition Supplement  Nutritional Goals: Pt will consume greater than 50% of meals and ONS this admission

## 2020-08-19 NOTE — PROGRESS NOTES
Occupational Therapy   Occupational Therapy Initial Assessment/Treatment  Date: 2020   Patient Name: Leroy Luis  MRN: [id number]     : 1965    Date of Service: 2020    Discharge Recommendations:  24 hour supervision or assist       Assessment   Performance deficits / Impairments: Decreased functional mobility ; Decreased balance;Decreased ADL status  After evaluation, pt found to be presenting with the above mentioned occupational performance deficits which are affecting participation in daily living skills. Pt would benefit from continued skilled occupational therapy to address ADLs, functional mobility, and safety while in acute care. Prognosis: Good  Decision Making: Low Complexity  OT Education: OT Role;Plan of Care;ADL Adaptive Strategies;Transfer Training  Patient Education: Disease specific: in shlomo of abd.pain educated patient in compensatory methods for LE dressing and transfers. Pt verbalized understanding. REQUIRES OT FOLLOW UP: Yes  Activity Tolerance  Activity Tolerance: Patient Tolerated treatment well;Patient limited by pain  Safety Devices  Safety Devices in place: Yes  Type of devices: Left in chair;Call light within reach;Nurse notified; Chair alarm in place;Gait belt           Patient Diagnosis(es): There were no encounter diagnoses.      has a past medical history of Acute kidney injury ([address] Acute on chronic congestive heart failure ([address] Alcohol dependence ([address] Bipolar 1 disorder ([address] CAD (coronary artery disease), Cardiomyopathy ([address] Cellulitis, CHF (congestive heart failure) ([address] COPD (chronic obstructive pulmonary disease) ([address] Diabetic ulcer of left foot associated with type 2 diabetes mellitus ([address] Diabetic ulcer of toe of right foot associated with type 2 diabetes mellitus ([address] GERD (gastroesophageal reflux disease), High cholesterol, HTN (hypertension), Kidney disease, chronic, stage II (mild, EGFR 60+ ml/min), MI (myocardial infarction) ([address] Positive FIT (fecal immunochemical test), Pulmonary nodule, and PVD (peripheral vascular disease) ([facility] has a past surgical history that includes Tonsillectomy; Cholecystectomy; tumor excision; Upper gastrointestinal endoscopy (4/25/2013); Upper gastrointestinal endoscopy (12/10/2015); Upper gastrointestinal endoscopy (01/06/2017); Arterial bypass surgry (Left, 01/06/2016); Cardiac catheterization (03/27/2018); Coronary angioplasty with stent (03/16/2018); Rotator cuff repair (Left, 04/22/2016); Coronary angioplasty with stent (01/03/2018); Insertable Cardiac Monitor (02/14/2019); femoral bypass (Right, 5/16/2019); transluminal angioplasty (Left, 10/08/2019); Cardiac catheterization (01/20/2020); Coronary artery bypass graft (N/A, 1/27/2020); vascular surgery (Left, 12/08/2015); transluminal angioplasty (Left, 04/29/2020); vascular surgery (Bilateral, 07/07/2020); Coronary angioplasty with stent (10/07/2019); transesophageal echocardiogram (01/26/2020); and Kidney surgery. Restrictions  Restrictions/Precautions  Restrictions/Precautions: General Precautions, Fall Risk  Position Activity Restriction  Other position/activity restrictions: High fall risk per nursing assessment, up with assistance, IV lines, [place]    Subjective   General  Chart Reviewed: Yes  Patient assessed for rehabilitation services?: Yes  Family / Caregiver Present: No  Referring Practitioner: Sai Lester MD 8/18/2020  Diagnosis: R upper quadrant pain  Subjective  Subjective: Pt pleasant and agreeable to OT evaluation, states in a lot of pain and waiting for pain meds from nurse. Patient Currently in Pain: Yes  Pain Assessment  Pain Assessment: 0-10  Pain Level: 9  Pain Type: Acute pain  Pain Location: Flank  Non-Pharmaceutical Pain Intervention(s): Emotional support;Repositioned; Rest      Social/Functional History  Social/Functional History  Lives With: Friend(s)(and a daughter)  Type of Home: Trailer  Home Layout: One level  Home Access: Stairs to enter with rails  Entrance Stairs - Number of Steps: 4 [place]  Entrance Stairs - Rails: Both  Bathroom Shower/Tub: Tub/Shower unit  Bathroom Toilet: Standard  Bathroom Equipment: Shower chair  Home Equipment: Standard walker, Rolling walker  ADL Assistance: Independent  Homemaking Assistance: Independent  Homemaking Responsibilities: Yes  Ambulation Assistance: Independent  Transfer Assistance: Independent  Active : Yes  Occupation: On disability  Leisure & Hobbies: help people, drives friends to store and cleans for people       Objective   Vision: Impaired  Vision Exceptions: Wears glasses for reading  Hearing: Within functional limits    Orientation  Overall Orientation Status: Within Functional Limits  Observation/Palpation  Posture: Fair  Balance  Sitting Balance: Supervision  Standing Balance: Contact guard assistance  Functional Mobility  Functional - Mobility Device: (gait belt)  Activity: Other  Assist Level: Contact guard assistance  ADL  UE Dressing: Independent  LE Dressing: Maximum assistance  Toileting: (roche)  Tone RUE  RUE Tone: Normotonic  Tone LUE  LUE Tone: Normotonic  Coordination  Movements Are Fluid And Coordinated: Yes     Bed mobility  Supine to Sit: Stand by assistance  Sit to Supine: Unable to assess(up to chair at end of sesion)  Transfers  Stand Step Transfers: Supervision;Contact guard assistance(no device, gait belt)  Sit to stand: Stand by assistance  Stand to sit: Stand by assistance     Cognition  Overall Cognitive Status: WFL                 LUE AROM (degrees)  LUE AROM : WFL  RUE AROM (degrees)  RUE AROM : WFL  LUE Strength  Gross LUE Strength: WFL  RUE Strength  Gross RUE Strength: WFL                   Plan   Plan  Times per week: 3-5x's a week while in acute care             AM-PAC Score        AM-PAC Inpatient Daily Activity Raw Score: 16 (08/19/20 1257)  AM-PAC Inpatient ADL T-Scale Score : 35.96 (08/19/20 1257)  ADL Inpatient CMS 0-100% Score: 53.32 (08/19/20 1257)  ADL Inpatient CMS G-Code Modifier : CK (08/19/20 1257)    Goals  Short term goals  Time Frame for Short term goals: 1 week unless otherwise specified 8/26  Short term goal 1: Pt will complete LE dressing with SBA  Short term goal 2: Pt will complete toielt transfers with SBA by 8/24  Patient Goals   Patient goals : \"to be able to go home from here\"       Therapy Time   Individual Concurrent Group Co-treatment   Time In 0900         Time Out 0933         Minutes 33         Timed Code Treatment Minutes: 23 Minutes       Fredi Calderon, OTR/L  If pt is unable to be seen after this session, please let this note serve as discharge summary. Please see case management note for discharge disposition. Thank you.

## 2020-08-19 NOTE — CARE COORDINATION
Referral to [facility].  Spoke with Donis (liaison)      Geovanny Poon RN

## 2020-08-19 NOTE — CONSULTS
[facility]  Consult Note           Reason for Consult:  Chronic kidney disease, fluid overload  Requesting Physician:  Dr. Wendy Jamil    Chief Complaint:  Right upper quadrant abdominal pain  History Obtained From:  patient, electronic medical record    History of Present Ilness:    62year old female with CHF, COPD, HTN, DM2, CKD-3, CAD s/p stenting/CABG admitted with RUQ abdominal pain. We have been asked to assist in further mgmt of her CKD, fluid overload. Started having RUQ abdominal and flank pain on day PTA. Feeling more sob, edematous. Discharged last admission on torsemide 100 mg / d; spironolactone 50 mg / d; KCl 20 mEq / d. Denies any reduced intake, chest pain, or fevers.       Past Medical History:        Diagnosis Date    Acute kidney injury ([address] 12/25/2019    Acute on chronic congestive heart failure (HCC)     Alcohol dependence ([address] 3/10/2010    Bipolar 1 disorder ([address]     CAD (coronary artery disease)     Cardiomyopathy ([address] 06/29/2020    EF= 25%    Cellulitis 6/3/2020    [facility]congest[facility] heart failure) [facility]     COPD (chronic obstructive pulmonary disease) ([facility])     Diabetic ulcer of left foot associated with type 2 diabetes mellitus ([address] 1/18/2020    Diabetic ulcer of toe of right foot associated with type 2 diabetes mellitus ([address] 1/18/2020    GERD (gastroesophageal reflux disease)     High cholesterol     HTN (hypertension)     Kidney disease, chronic, stage II (mild, EGFR 60+ ml/min)     MI (myocardial infarction) ([address] 10/07/2019    NSTEMI    Positive FIT (fecal immunochemical test) 2/28/2020    Pulmonary nodule     PVD (peripheral vascular disease) ([address]        Past Surgical History:        Procedure Laterality Date    ARTERIAL BYPASS SURGRY Left 01/06/2016    Axillary/Subclavian to Brachial Bypass w/reversed SVG    CARDIAC CATHETERIZATION  03/27/2018    Dr. Marshall Counter - at [address]  01/20/2020 Dr. Tejas Pereira      pancreatitis post surgery    CORONARY ANGIOPLASTY WITH STENT PLACEMENT  03/16/2018    MELODY- 3.5 x 38 and 3.5 x 20 to Cx    CORONARY ANGIOPLASTY WITH STENT PLACEMENT  01/03/2018    MELODY- 3.0 x 38 and 2.75 x 26 and 2.75 x 8 and 2.75 x 22 to the LAD    CORONARY ANGIOPLASTY WITH STENT PLACEMENT  10/07/2019    MELODY- 2.5 x 8 to [address] GRAFT N/A 1/27/2020    CORONARY ARTERY BYPASS GRAFTING X 1, ON PUMP BEATING HEART, INTERNAL MAMMARY ARTERY TO LEFT ANTERIOR DESCENDING ARTERY, VAS CATH INSERTION, URI, PLATELET GEL APPLICATION, 5 LEVEL BILATERAL INTERCOSTAL NERVE BLOCK, STERNAL PLATING performed by Becca Mederos MD at [address] [facility] Right 5/16/2019    fem-pop, performed by Roldan Chan MD at [address]  02/14/2019    CardioMEMs insertion for CHF    KIDNEY SURGERY      ROTATOR CUFF REPAIR Left 04/22/2016    TONSILLECTOMY      TRANSESOPHAGEAL ECHOCARDIOGRAM  01/26/2020    TRANSLUMINAL ANGIOPLASTY Left 10/08/2019    with stenting, at [facility]    [address] Left 04/29/2020    of the [facility] re-occlusion    TUMOR EXCISION      benign tumors X 2 chest & axilla    UPPER GASTROINTESTINAL ENDOSCOPY  4/25/2013    BX barretts, HH, gastritis    UPPER GASTROINTESTINAL ENDOSCOPY  12/10/2015    UPPER GASTROINTESTINAL ENDOSCOPY  01/06/2017    Gastritis    VASCULAR SURGERY Left 12/08/2015    upper extremity and mesenteric angiogram (diagnostic only)    VASCULAR SURGERY Bilateral 07/07/2020    diagnostic lower extremity angiogram only       Home Medications:    No current facility-administered medications on file prior to encounter.       Current Outpatient Medications on File Prior to Encounter   Medication Sig Dispense Refill    insulin aspart (NOVOLOG FLEXPEN) 100 UNIT/ML injection pen Inject 10 Units into the skin 3 times daily (before meals) 5 pen 3    HYDROcodone-acetaminophen (NORCO) 5-325 MG per tablet Take 1 tablet by mouth every 4 hours as needed for Pain for up to 7 days. Intended supply: 7 days. Take lowest dose possible to manage pain 30 tablet 0    torsemide (DEMADEX) 100 MG tablet Take 1 tablet by mouth daily 100 mg daily except 50 mg twice weekly (Monday and Thursday). 30 tablet 0    spironolactone (ALDACTONE) 50 MG tablet Take 1 tablet by mouth daily 30 tablet 0    ferrous sulfate (IRON 325) 325 (65 Fe) MG tablet Take 1 tablet by mouth 2 times daily (with meals) 60 tablet 0    midodrine (PROAMATINE) 5 MG tablet Take 1 tablet by mouth 2 times daily (with meals) 60 tablet 0    potassium chloride (KLOR-CON M) 20 MEQ extended release tablet Take 1 tablet by mouth daily 30 tablet 0    traZODone (DESYREL) 100 MG tablet Take 100 mg by mouth nightly as needed for Sleep Can take 1-2 tabs nightly      DOXEPIN HCL PO Take 1 capsule by mouth nightly as needed Patient unsure of exact dosage      albuterol sulfate  (90 Base) MCG/ACT inhaler Inhale 2 puffs into the lungs every 6 hours as needed for Wheezing or Shortness of Breath 1 Inhaler 3    Insulin Degludec (TRESIBA FLEXTOUCH) 100 UNIT/ML SOPN Inject 30 Units into the skin nightly 10 pen 5    insulin lispro, 1 Unit Dial, 100 UNIT/ML SOPN Inject 10 Units into the skin 3 times daily 3 pen 2    atorvastatin (LIPITOR) 80 MG tablet Take 1 tablet by mouth nightly 90 tablet 3    tiotropium (SPIRIVA RESPIMAT) 2.5 MCG/ACT AERS inhaler INHALE 2 PUFFS INTO THE LUNGS DAILY 1 Inhaler 6    buprenorphine-naloxone (SUBOXONE) 8-2 MG FILM SL film Place 1 Film under the tongue 2 times daily.  benztropine (COGENTIN) 1 MG tablet Take 1 mg by mouth nightly       blood glucose test strips (EXACTECH TEST) strip 6 times daily.  200 strip 6    Insulin Pen Needle (B-D ULTRAFINE III SHORT PEN) 31G X 8 MM MISC Five shots a day 200 each 2    INSULIN SYRINGE .5CC/29G 29G X 1/2\" 0.5 ML MISC 1 each by Does not apply route daily 100 each 3    Liraglutide (VICTOZA) 18 MG/3ML SOPN SC injection Inject 1.2 mg into the skin daily 2 pen 3    pantoprazole (PROTONIX) 40 MG tablet Take 1 tablet by mouth 2 times daily 60 tablet 0    clopidogrel (PLAVIX) 75 MG tablet TAKE 1 TABLET BY MOUTH EVERY DAY 30 tablet 5    magnesium oxide (MAG-OX) 400 (240 Mg) MG tablet TAKE 1 TABLET ONCE DAILY 30 tablet 11    busPIRone (BUSPAR) 30 MG tablet Take 30 mg by mouth 2 times daily       aspirin EC 81 MG EC tablet Take 1 tablet by mouth daily 30 tablet 3    ARIPiprazole (ABILIFY) 10 MG tablet Take 10 mg by mouth daily       lamoTRIgine (LAMICTAL) 150 MG tablet Take 200 mg by mouth 2 times daily          Allergies:  Lisinopril    Social History:    Social History     Socioeconomic History    Marital status: Single     Spouse name: Not on file    Number of children: Not on file    Years of education: Not on file    Highest education level: Not on file   Occupational History    Not on file   Social Needs    Financial resource strain: Not on file    Food insecurity     Worry: Not on file     Inability: Not on file    Transportation needs     Medical: Not on file     Non-medical: Not on file   Tobacco Use    Smoking status: Current Every Day Smoker     Packs/day: 0.50     Years: 30.00     Pack years: 15.00     Types: Cigarettes     Last attempt to quit: 2019     Years since quittin.0    Smokeless tobacco: Never Used    Tobacco comment: trying to quit, one cigarette daily   Substance and Sexual Activity    Alcohol use: No     Comment: quit      Drug use: Never    Sexual activity: Yes     Partners: Male   Lifestyle    Physical activity     Days per week: Not on file     Minutes per session: Not on file    Stress: Not on file   Relationships    Social connections     Talks on phone: Not on file     Gets together: Not on file     Attends Advent service: Not on file     Active member of club or organization: Not on file     Attends meetings of clubs or organizations: Not on file Relationship status: Not on file    Intimate partner violence     Fear of current or ex partner: Not on file     Emotionally abused: Not on file     Physically abused: Not on file     Forced sexual activity: Not on file   Other Topics Concern    Not on file   Social History Narrative    Not on file       Family History:   Family History   Problem Relation Age of Onset    Heart Disease Maternal Grandmother     Cancer Mother         lung and brain cancer    Cancer Maternal Aunt         lung and brain cancer       Review of Systems:   Pertinent positives stated above in HPI. Remainder of 10 point review of systems were reviewed and were negative.     Physical exam:   Constitutional:  VITALS:  /85   Pulse 95   Temp 98 °F (36.7 °C) (Oral)   Resp 14   Ht 5' 4.5\" (1.638 m)   Wt 252 lb 3.2 oz (114.4 kg)   LMP 10/24/2012   SpO2 95%   BMI 42.62 kg/m²   Gen: alert, awake, nad  Skin: no rash, turgor wnl  Heent:  eomi, mmm  Neck: no bruits or jvd noted, thyroid normal  Cardiovascular:  S1, S2 without m/r/g  Respiratory: CTA B without w/r/r; respiratory effort normal  Abdomen:  +bs, soft, nt, nd, no hepatosplenomegaly  Ext: ++ lower extremity edema  Psychiatric: mood and affect appropriate; judgement and insight intact  Musculoskeletal:  Rom, muscular strength limited; digits, nails normal    Data/  CBC:   Lab Results   Component Value Date    WBC 7.3 08/19/2020    RBC 3.72 08/19/2020    HGB 9.8 08/19/2020    HCT 30.1 08/19/2020    MCV 80.9 08/19/2020    MCH 26.2 08/19/2020    MCHC 32.4 08/19/2020    RDW 24.6 08/19/2020     08/19/2020    MPV 8.5 08/19/2020     BMP:    Lab Results   Component Value Date     08/19/2020    K 3.3 08/19/2020    K 2.8 08/18/2020    CL 85 08/19/2020    CO2 33 08/19/2020    BUN 53 08/19/2020    LABALBU 3.6 08/18/2020    CREATININE 1.2 08/19/2020    CALCIUM 9.4 08/19/2020    GFRAA 56 08/19/2020    LABGLOM 46 08/19/2020    GLUCOSE 172 08/19/2020 Assessment/Plan    SHAUNNA/CKD stage 3  - Etiology: ATN (hypotension; contrast dye) from last admission  - Data: SCr peaked at 2.0, now back in low 1 range  - Plan: Continue lasix drip 5 mg/hour & aldactone for diuresis    CHF - acute on chronic  - Diuresis as above  - 2 liter/day fluid restriction  - Strict I's/O's, daily weights   - CardioMEMS monitoring per Cardiology     Recent Perinephric hematoma. - Complication of L renal artery angioplasty / stent placement  - S/P coil embolization of subsegmental L renal artery  - Stable in size based on CT this admission     Renovascular HTN.  - S/P L renal artery angioplasty / stent placement  - BP satisfactory. - On scheduled midodrine 5 mg po bid  - Follow for late recurrence of HTN (post-perinephric bleed) (I.e, Page kidney).     Anemia - blood loss. - Hgb stabilized in 8-9 range now  - S/P transfusion 2 units PRBC's last admission, oral Fe added.     Hyponatremia  - Has been present consistently since 4/20   - ? Etiology - likely multifactorial with pain, volume excess / CHF       - CHF and ? lamictal may be cause of chronic hyponatremia  - Rec'd Tolvaptan last admission with sodium as low as 120, off metolazone    Hypokalemia.  - Scheduled K replacement 20 meq a day with prn replacement       Thank you for the consultation. Please do not hesitate to call with questions.     [employer]

## 2020-08-19 NOTE — PROGRESS NOTES
Paged MD:    \"FYI the stress test can not be done until tomorrow. Order is in for NPO effective now, can we change this to after midnight and let the patient eat? Thank you. Will watch for updated orders. \"

## 2020-08-19 NOTE — PROGRESS NOTES
Physical Therapy    Facility/Department: [facility] B3 - MED SURG  Initial Assessment    NAME: Perry Rocha  : 1965  MRN: [id number]    Date of Service: 2020    Discharge Recommendations:  Home with assist PRN, Home with Home health PT   PT Equipment Recommendations  Equipment Needed: No    Assessment   Body structures, Functions, Activity limitations: Decreased functional mobility ; Decreased strength;Decreased balance; Increased pain  Assessment: Pt referred for PT evaluation during current hospital stay with dx of R flank pain and RUQ pain secondary to congestive hepatopathy from acute-on-chronic CHF. Pt currently functioning slightly below her baseline, limited in activity tolerance by pain and requiring CGA/close SBA x 1 for safe transfers/amb. Given pt's current deficits, recommend she return home with assist PRN from family and home PT. Treatment Diagnosis: Difficulty walking  Specific instructions for Next Treatment: Progress ther ex and mobility as tolerated, assess stair amb prior to D/C home  Prognosis: Good  Decision Making: Low Complexity  PT Education: Goals;PT Role;Plan of Care;Precautions;Transfer Training;Disease Specific Education;Gait Training;General Safety; Functional Mobility Training  Patient Education: Disease-specific education: Pt educated on compensatory transfer/gait strategies in light of new-onset RUQ/R flank pain; pt verbalizes/demonstrates understanding. REQUIRES PT FOLLOW UP: Yes  Activity Tolerance: Patient Tolerated treatment well;Patient limited by pain       Patient Diagnosis(es): There were no encounter diagnoses.      has a past medical history of Acute kidney injury ([address] Acute on chronic congestive heart failure ([address] Alcohol dependence ([address] Bipolar 1 disorder ([address] CAD (coronary artery disease), Cardiomyopathy ([address] Cellulitis, CHF (congestive heart failure) ([address] COPD (chronic obstructive pulmonary disease) ([address] Diabetic ulcer of left foot associated with type 2 diabetes mellitus ([facility] Diabetic ulcer of toe of right foot associated with type 2 diabetes mellitus ([facility] GERD (gastroesophageal reflux disease), High cholesterol, HTN (hypertension), Kidney disease, chronic, stage II (mild, EGFR 60+ ml/min), MI (myocardial infarction) ([facility] Positive FIT (fecal immunochemical test), Pulmonary nodule, and PVD (peripheral vascular disease) ([facility] has a past surgical history that includes Tonsillectomy; Cholecystectomy; tumor excision; Upper gastrointestinal endoscopy (4/25/2013); Upper gastrointestinal endoscopy (12/10/2015); Upper gastrointestinal endoscopy (01/06/2017); Arterial bypass surgry (Left, 01/06/2016); Cardiac catheterization (03/27/2018); Coronary angioplasty with stent (03/16/2018); Rotator cuff repair (Left, 04/22/2016); Coronary angioplasty with stent (01/03/2018); Insertable Cardiac Monitor (02/14/2019); femoral bypass (Right, 5/16/2019); transluminal angioplasty (Left, 10/08/2019); Cardiac catheterization (01/20/2020); Coronary artery bypass graft (N/A, 1/27/2020); vascular surgery (Left, 12/08/2015); transluminal angioplasty (Left, 04/29/2020); vascular surgery (Bilateral, 07/07/2020); Coronary angioplasty with stent (10/07/2019); transesophageal echocardiogram (01/26/2020); and Kidney surgery.     Restrictions  Restrictions/Precautions  Restrictions/Precautions: General Precautions, Fall Risk  Position Activity Restriction  Other position/activity restrictions: High fall risk per nursing assessment, up with assistance, IV lines, [place]  Vision/Hearing  Vision: Impaired  Vision Exceptions: Wears glasses for reading  Hearing: Within functional limits     Subjective  General  Chart Reviewed: Yes  Patient assessed for rehabilitation services?: Yes  Additional Pertinent Hx: s/p L renal artery angioplasty and stent at [facility] 8/04/20  Family / Caregiver Present: No  Referring Practitioner: Dr. Jorje Guajardo  Referral Date : 08/18/20  Diagnosis: R flank pain and RUQ pain, congestive hepatopathy 2* acute-on-chronic CHF  Follows Commands: Within Functional Limits  General Comment  Comments: Pt resting in bed upon entry of therapy staff  Subjective  Subjective: Pt agreeable to work with PT/OT this morning. States she's having moderate pain in R side/flank at rest.  \"It gets worse when I get up and move. \"  Pt feels she'll need additional help at home at D/C and is open to home care services if needed. Pain Screening  Patient Currently in Pain: Yes  Pain Assessment  Pain Assessment: 0-10  Pain Level: 10(10/10 after amb, 6/10 at rest)  Pain Type: Acute pain  Pain Location: Flank  Pain Orientation: Right;Mid  Pain Descriptors: Radiating(pain radiates \"from front to back\")  Pain Frequency: Continuous  Non-Pharmaceutical Pain Intervention(s): Ambulation/Increased Activity;Repositioned; Emotional support  Intervention List: Patient able to continue with treatment    Orientation  Orientation  Overall Orientation Status: Within Normal Limits     Social/Functional History  Social/Functional History  Lives With: Friend(s)(and a daughter)  Type of Home: Trailer  Home Layout: One level  Home Access: Stairs to enter with rails  Entrance Stairs - Number of Steps: 4 GARLAND  Entrance Stairs - Rails: Both  Bathroom Shower/Tub: Tub/Shower unit  Bathroom Toilet: Standard  Bathroom Equipment: Shower chair  Home Equipment: Standard walker, Rolling walker  ADL Assistance: Independent  Homemaking Assistance: Independent  Homemaking Responsibilities: Yes  Ambulation Assistance: Independent  Transfer Assistance: Independent  Active : Yes  Occupation: On disability  Leisure & Hobbies: help people, drives friends to store and cleans for people    Objective  Observation/Palpation  Posture: Fair    RLE AROM: WFL  LLE AROM : WFL  Strength RLE: WFL  Strength LLE: WFL     Bed mobility  Supine to Sit: Stand by assistance(moving toward L side, HOB elevated ~30 degrees)  Scooting: Stand by assistance(to scoot forward to EOB)     Transfers  Sit to Stand: Stand by assistance  Stand to sit: Stand by assistance  Bed to Chair: Contact guard assistance(without AD)     Ambulation  Surface: level tile  Device: No Device  Assistance: Contact guard assistance;Stand by assistance(CGA/close SBA x 1)  Quality of Gait: Pt amb with slow tamir with widened GABINO and increased degree of med<>lat postural sway during stance phase. Pt fairly steady despite slow pace; no LOB. Gait Deviations: Slow Tamir; Increased GABINO; Decreased step length;Decreased step height  Distance: x 70 feet  Comments: Amb distance limited by c/o R flank pain. Balance  Posture: Fair  Sitting - Static: Good  Sitting - Dynamic: Good;-  Standing - Static: Good;-  Standing - Dynamic: Fair;+(without AD)    Plan   Times per week: 3-5x/week in acute care  Times per day: Daily  Specific instructions for Next Treatment: Progress ther ex and mobility as tolerated, assess stair amb prior to D/C home  Current Treatment Recommendations: Strengthening, Balance Training, Functional Mobility Training, Transfer Training, Gait Training, Home Exercise Program, Safety Education & Training, Pain Management, Stair training  Safety Devices:  All fall risk precautions in place, Left in chair, Call light within reach, Chair alarm in place, Nurse notified, Gait belt, Patient at risk for falls    AM-PAC Score  AM-PAC Inpatient Mobility Raw Score : 22 (08/19/20 1127)  AM-PAC Inpatient T-Scale Score : 53.28 (08/19/20 1127)  Mobility Inpatient CMS 0-100% Score: 20.91 (08/19/20 1127)  Mobility Inpatient CMS G-Code Modifier : Claudio Rondon (08/19/20 1127)    Goals  Short term goals  Time Frame for Short term goals: 1 week, 8/26/20 (unless otherwise specified)  Short term goal 1: Pt will transfer supine <-> sit with modified(I)  Short term goal 2: Pt will transfer sit <-> stand and bed>chair with modified(I)  Short term goal 3: Pt will ambulate x 200 feet without AD with supervision/modified(I)  Short term goal 4: By 8/21/20: Pt will tolerate 12-15 reps BLE exercise for strengthening, balance, and endurance  Short term goal 5: Pt will ambulate up/down 4 steps using B handrails with SBA  Patient Goals   Patient goals : \"To walk better and go home\"       Therapy Time   Individual Concurrent Group Co-treatment   Time In 0900         Time Out 0933         Minutes 33         Timed Code Treatment Minutes: Janet Bourne [address] #[id number]    If pt is unable to be seen after this session, please let this note serve as discharge summary. Please see case management note for discharge disposition. Thank you.

## 2020-08-19 NOTE — PROGRESS NOTES
RESPIRATORY THERAPY ASSESSMENT    Name:  Prashant Muse  Medical Record Number:  [id number]  Age: 62 y.o. Gender: female  : 1965  Today's Date:  2020  Room:  0351/0351-02    Assessment     Is the patient being admitted for a COPD or Asthma exacerbation? No   (If yes the patient will be seen every 4 hours for the first 24 hours and then reassessed)    Patient Admission Diagnosis      Allergies  Allergies   Allergen Reactions    Lisinopril Other (See Comments)     Severe hypotension       Minimum Predicted Vital Capacity:     GEORGE          Actual Vital Capacity:      GEORGE              Pulmonary History:COPD and CHF/Pulmonary Edema  Home Oxygen Therapy:  room air  Home Respiratory Therapy:Albuterol and Tiotropium Bromide   Current Respiratory Therapy:  HHN Albuterol PRN, Spiriva Daily  Treatment Type: HHN  Medications: Albuterol    Respiratory Severity Index(RSI)   Patients with orders for inhalation medications, oxygen, or any therapeutic treatment modality will be placed on Respiratory Protocol. They will be assessed with the first treatment and at least every 72 hours thereafter. The following severity scale will be used to determine frequency of treatment intervention.     Smoking History: Pulmonary Disease or Smoking History, Greater than 15 pack year = 2    Social History  Social History     Tobacco Use    Smoking status: Current Every Day Smoker     Packs/day: 0.50     Years: 30.00     Pack years: 15.00     Types: Cigarettes     Last attempt to quit: 2019     Years since quittin.0    Smokeless tobacco: Never Used    Tobacco comment: trying to quit, one cigarette daily   Substance Use Topics    Alcohol use: No     Comment: quit      Drug use: Never       Recent Surgical History: None = 0  Past Surgical History  Past Surgical History:   Procedure Laterality Date    ARTERIAL BYPASS SURGRY Left 2016    Axillary/Subclavian to Brachial Bypass w/reversed SVG   Lenarlyn Ni CARDIAC CATHETERIZATION  03/27/2018    Dr. Manjeet Christie - at [address]  01/20/2020    Dr. Glynn Gauthier      pancreatitis post surgery    CORONARY ANGIOPLASTY WITH STENT PLACEMENT  03/16/2018    MELODY- 3.5 x 38 and 3.5 x 20 to Cx    CORONARY ANGIOPLASTY WITH STENT PLACEMENT  01/03/2018    MELODY- 3.0 x 38 and 2.75 x 26 and 2.75 x 8 and 2.75 x 22 to the LAD    CORONARY ANGIOPLASTY WITH STENT PLACEMENT  10/07/2019    MELODY- 2.5 x 8 to [address] GRAFT N/A 1/27/2020    CORONARY ARTERY BYPASS GRAFTING X 1, ON PUMP BEATING HEART, INTERNAL MAMMARY ARTERY TO LEFT ANTERIOR DESCENDING ARTERY, VAS CATH INSERTION, URI, PLATELET GEL APPLICATION, 5 LEVEL BILATERAL INTERCOSTAL NERVE BLOCK, STERNAL PLATING performed by Fany Lyn MD at [address] Right 5/16/2019    fem-pop, performed by Pretty Muhammad MD at [address]  02/14/2019    CardioMEMs insertion for CHF    KIDNEY SURGERY      ROTATOR CUFF REPAIR Left 04/22/2016    TONSILLECTOMY      TRANSESOPHAGEAL ECHOCARDIOGRAM  01/26/2020    TRANSLUMINAL ANGIOPLASTY Left 10/08/2019    with stenting, at SFA    [address] Left 04/29/2020    of the SFA re-occlusion    TUMOR EXCISION      benign tumors X 2 chest & axilla    UPPER GASTROINTESTINAL ENDOSCOPY  4/25/2013    BX barretts, HH, gastritis    UPPER GASTROINTESTINAL ENDOSCOPY  12/10/2015    UPPER GASTROINTESTINAL ENDOSCOPY  01/06/2017    Gastritis    VASCULAR SURGERY Left 12/08/2015    upper extremity and mesenteric angiogram (diagnostic only)    VASCULAR SURGERY Bilateral 07/07/2020    diagnostic lower extremity angiogram only       Level of Consciousness: Alert, Oriented, and Cooperative = 0    Level of Activity: Walking with assistance = 1    Respiratory Pattern: Regular Pattern; RR 8-20 = 0    Breath Sounds: Absent bilaterally and/or with wheezes = 3    Sputum   ,  ,    Cough: Strong, spontaneous, non-productive = 0    Vital Signs   /85   Pulse 95   Temp 98.2 °F (36.8 °C) (Oral)   Resp 20   LMP 10/24/2012   SpO2 97%   SPO2 (COPD values may differ): Greater than or equal to 92% on room air = 0    Peak Flow (asthma only): not applicable = 0    RSI: 5-6 = Q4hr PRN (every four hours as needed) for dyspnea        Plan       Goals: medication delivery, mobilize retained secretions, volume expansion and improve oxygenation    Patient/caregiver was educated on the proper method of use for Respiratory Care Devices:  Yes      Level of patient/caregiver understanding able to:   ? Verbalize understanding   ? Demonstrate understanding       ? Teach back        ? Needs reinforcement       ? No available caregiver               ? Other:     Response to education:  Good     Is patient being placed on Home Treatment Regimen? Yes     Does the patient have everything they need prior to discharge? NA     Comments: Patient admits with chest pain. Plan of Care: HHN Albuterol PRN, Spiriva Daily    Electronically signed by Tammy Chavira RCP on 8/18/2020 at 9:45 PM    Respiratory Protocol Guidelines     1. Assessment and treatment by Respiratory Therapy will be initiated for medication and therapeutic interventions upon initiation of aerosolized medication. 2. Physician will be contacted for respiratory rate (RR) greater than 35 breaths per minute. Therapy will be held for heart rate (HR) greater than 140 beats per minute, pending direction from physician. 3. Bronchodilators will be administered via Metered Dose Inhaler (MDI) with spacer when the following criteria are met:  a. Alert and cooperative     b. HR < 140 bpm  c. RR < 30 bpm                d. Can demonstrate a 2-3 second inspiratory hold  4. Bronchodilators will be administered via Hand Held Nebulizer [facility]) to patients when ANY of the following criteria are met  a. Incognizant or uncooperative          b.  Patients treated with HHN at Home c. Unable to demonstrate proper use of MDI with spacer     d. RR > 30 bpm   5. Bronchodilators will be delivered via Metered Dose Inhaler (MDI), HHN, Aerogen to intubated patients on mechanical ventilation. 6. Inhalation medication orders will be delivered and/or substituted as outlined below. Aerosolized Medications Ordering and Administration Guidelines:    1. All Medications will be ordered by a physician, and their frequency and/or modality will be adjusted as defined by the patients Respiratory Severity Index (RSI) score. 2. If the patient does not have documented COPD, consider discontinuing anticholinergics when RSI is less than 9.  3. If the bronchospasm worsens (increased RSI), then the bronchodilator frequency can be increased to a maximum of every 4 hours. If greater than every 4 hours is required, the physician will be contacted. 4. If the bronchospasm improves, the frequency of the bronchodilator can be decreased, based on the patient's RSI, but not less than home treatment regimen frequency. 5. Bronchodilator(s) will be discontinued if patient has a RSI less than 9 and has received no scheduled or as needed treatment for 72  Hrs. Patients Ordered on a Mucolytic Agent:    1. Must always be administered with a bronchodilator. 2. Discontinue if patient experiences worsened bronchospasm, or secretions have lessened to the point that the patient is able to clear them with a cough. Anti-inflammatory and Combination Medications:    1. If the patient lacks prior history of lung disease, is not using inhaled anti-inflammatory medication at home, and lacks wheezing by examination or by history for at least 24 hours, contact physician for possible discontinuation.

## 2020-08-19 NOTE — CONSULTS
[address] [facility]  [phone]      Attending Physician: Emmanuel Oliveira MD  Reason for Consultation/Chief Complaint: Right-sided pain    Subjective   History of Present Illness:  Evy Gauthier is a 62 y.o. patient who presented to the hospital with complaints of right-sided pain, began in her right upper abdomen extending to the right chest.  She notes associated shortness of breath weight gain and swelling. She presented to the hospital yesterday with these complaints and was admitted due to concerns for heart failure exacerbation. She states her weight is in the 250s currently and should be in the 230s. She states she has been compliant with her medications. Work-up is revealed elevated troponin level of 0.02 and elevated proBNP level 5283. CardioMEMS device was assessed and found to show elevated readings. Patient is a longstanding history of cardiovascular disease and peripheral vascular disease. She had been following with Dr. Alejandro Ballard from [facility] and has not had an MI originally in 2017 as well as another one in 2018 and has had stents placed to her LAD and circumflex. She is also had peripheral vascular intervention with stents in her right SFA in 2015 and ultimately required a left axillary/subclavian to brachial bypass with a saphenous vein graft for left sided claudication in 2016. More recently, in 2018, she had a cardiac catheterization with Dr. Hakeem Mario which showed patent LAD and circumflex stents with reduced left ventricular function with EF of 30% and anteroapical akinesis. Due to heart failure, she underwent a cardio mems implantation with Dr. Cathlean Kawasaki in February 2019.   In January 2020, she underwent cardiac catheterization with Dr. Alfonso Hammonds for worsening angina and she was found to have restenosis of the LAD and circumflex and had significant disease in the RCA and was referred for cardiac surgery and had CABG with Dr. Melly Bryan on January 27, 2020 with pedicled LIMA to LAD, of note, mitral regurgiation was noted but it was felt that intraopTEE did not show significant MR and mitral valve surgery was deferred at that time. In 2020, I saw patient for peripheral vascular evaluation and it was felt that she needed vascular surgery input and was referred to Dr. Erlin Cast from vascular surgery. In 2020, patient has had peripheral angiography as well as left renal artery angioplasty and stenting complicated by left perinephric hematoma in this month, August 2020. She had follow-up angiography with Dr. Erlin Cast who performed coil embolization of a bleeding vessel. Past Medical History:   has a past medical history of Acute kidney injury ([address] Acute on chronic congestive heart failure ([address] Alcohol dependence ([address] Bipolar 1 disorder ([address] CAD (coronary artery disease), Cardiomyopathy ([address] Cellulitis, CHF (congestive heart failure) ([address] COPD (chronic obstructive pulmonary disease) ([address] Diabetic ulcer of left foot associated with type 2 diabetes mellitus ([address] Diabetic ulcer of toe of right foot associated with type 2 diabetes mellitus ([address] GERD (gastroesophageal reflux disease), High cholesterol, HTN (hypertension), Kidney disease, chronic, stage II (mild, EGFR 60+ ml/min), MI (myocardial infarction) ([address] Positive FIT (fecal immunochemical test), Pulmonary nodule, and PVD (peripheral vascular disease) ([address] Surgical History:   has a past surgical history that includes Tonsillectomy; Cholecystectomy; tumor excision; Upper gastrointestinal endoscopy (4/25/2013); Upper gastrointestinal endoscopy (12/10/2015); Upper gastrointestinal endoscopy (01/06/2017); Arterial bypass surgry (Left, 01/06/2016); Cardiac catheterization (03/27/2018); Coronary angioplasty with stent (03/16/2018); Rotator cuff repair (Left, 04/22/2016); Coronary angioplasty with stent (01/03/2018);  Insertable Cardiac Monitor (02/14/2019); femoral bypass (Right, 5/16/2019); transluminal angioplasty (Left, 10/08/2019); Cardiac catheterization (01/20/2020); Coronary artery bypass graft (N/A, 1/27/2020); vascular surgery (Left, 12/08/2015); transluminal angioplasty (Left, 04/29/2020); vascular surgery (Bilateral, 07/07/2020); Coronary angioplasty with stent (10/07/2019); transesophageal echocardiogram (01/26/2020); and Kidney surgery. Social History:   reports that she has been smoking cigarettes. She has a 15.00 pack-year smoking history. She has never used smokeless tobacco. She reports that she does not drink alcohol or use drugs. Family History:  family history includes Cancer in her maternal aunt and mother; Heart Disease in her maternal grandmother. Home Medications:  Were reviewed and are listed in nursing record and/or below  Prior to Admission medications    Medication Sig Start Date End Date Taking? Authorizing Provider   insulin aspart (NOVOLOG FLEXPEN) 100 UNIT/ML injection pen Inject 10 Units into the skin 3 times daily (before meals) 8/14/20   TRAY Dailey - LICHA   HYDROcodone-acetaminophen (NORCO) 5-325 MG per tablet Take 1 tablet by mouth every 4 hours as needed for Pain for up to 7 days. Intended supply: 7 days. Take lowest dose possible to manage pain 8/13/20 8/20/20  Jignesh Estrada MD   torsemide [facility]) 100 MG tablet Take 1 tablet by mouth daily 100 mg daily except 50 mg twice weekly (Monday and Thursday).  8/12/20 9/11/20  Taco Montgomery MD   spironolactone (ALDACTONE) 50 MG tablet Take 1 tablet by mouth daily 8/13/20 9/12/20  Taco Montgomery MD   ferrous sulfate (IRON 325) 325 (65 Fe) MG tablet Take 1 tablet by mouth 2 times daily (with meals) 8/12/20 9/11/20  Taco Montgomery MD   midodrine (PROAMATINE) 5 MG tablet Take 1 tablet by mouth 2 times daily (with meals) 8/12/20 9/11/20  Taco Montgomery MD   potassium chloride (KLOR-CON M) 20 MEQ extended release tablet Take 1 tablet by mouth daily 8/12/20 9/11/20  Taco Montgomery MD   traZODone (DESYREL) 100 MG tablet Take 100 mg by mouth nightly as needed for Sleep Can take 1-2 tabs nightly    Historical Provider, MD   DOXEPIN HCL PO Take 1 capsule by mouth nightly as needed Patient unsure of exact dosage    Historical Provider, MD   albuterol sulfate  (90 Base) MCG/ACT inhaler Inhale 2 puffs into the lungs every 6 hours as needed for Wheezing or Shortness of Breath 7/3/20   Daniella Tinsley MD   Insulin Degludec (TRESIBA FLEXTOUCH) 100 UNIT/ML SOPN Inject 30 Units into the skin nightly 5/27/20   TRAY Carr CNP   insulin lispro, 1 Unit Dial, 100 UNIT/ML SOPN Inject 10 Units into the skin 3 times daily 5/20/20   TRAY Carr CNP   atorvastatin (LIPITOR) 80 MG tablet Take 1 tablet by mouth nightly 2/28/20   TRAY Pederson CNP   tiotropium (SPIRIVA RESPIMAT) 2.5 MCG/ACT AERS inhaler INHALE 2 PUFFS INTO THE LUNGS DAILY 2/25/20   Stalin Cage MD   buprenorphine-naloxone (SUBOXONE) 8-2 MG FILM SL film Place 1 Film under the tongue 2 times daily. Historical Provider, MD   benztropine (COGENTIN) 1 MG tablet Take 1 mg by mouth nightly  12/13/19   Historical Provider, MD   blood glucose test strips (EXACTECH TEST) strip 6 times daily.  12/19/19   TRAY Carr CNP   Insulin Pen Needle (B-D ULTRAFINE III SHORT PEN) 31G X 8 MM MISC Five shots a day 12/3/19   TRAY Carr CNP   INSULIN SYRINGE .5CC/29G 29G X 1/2\" 0.5 ML MISC 1 each by Does not apply route daily 12/3/19   TRAY Carr CNP   Liraglutide (VICTOZA) 18 MG/3ML SOPN SC injection Inject 1.2 mg into the skin daily 10/29/19   TRAY Carr CNP   pantoprazole (PROTONIX) 40 MG tablet Take 1 tablet by mouth 2 times daily 10/1/19   Sherron Hale MD   clopidogrel (PLAVIX) 75 MG tablet TAKE 1 TABLET BY MOUTH EVERY DAY 9/3/19   TRAY Pederson CNP   magnesium oxide (MAG-OX) 400 (240 Mg) MG tablet TAKE 1 TABLET ONCE DAILY 5/1/19   Chavo Powell MD   busPIRone (BUSPAR) 30 MG tablet Take 30 mg by mouth 2 times daily  4/2/18 Historical Provider, MD   aspirin EC 81 MG EC tablet Take 1 tablet by mouth daily 1/8/16   Tamiko Agudelo MD   ARIPiprazole (ABILIFY) 10 MG tablet Take 10 mg by mouth daily     Historical Provider, MD   lamoTRIgine (LAMICTAL) 150 MG tablet Take 200 mg by mouth 2 times daily     Historical Provider, MD        CURRENT Medications:  perflutren lipid microspheres (DEFINITY) injection 1.65 mg, ONCE PRN  regadenoson (LEXISCAN) injection 0.4 mg, ONCE PRN  traMADol (ULTRAM) tablet 50 mg, Q6H PRN  ARIPiprazole (ABILIFY) tablet 10 mg, Daily  aspirin EC tablet 81 mg, Daily  benztropine (COGENTIN) tablet 1 mg, Nightly  buprenorphine-naloxone (SUBOXONE) 8-2 MG SL film 1 Film, BID  clopidogrel (PLAVIX) tablet 75 mg, Daily  ferrous sulfate (IRON 325) tablet 325 mg, BID WC  lamoTRIgine (LAMICTAL) tablet 200 mg, BID  magnesium oxide (MAG-OX) tablet 400 mg, Daily  midodrine (PROAMATINE) tablet 5 mg, BID WC  pantoprazole (PROTONIX) tablet 40 mg, BID  potassium chloride (KLOR-CON M) extended release tablet 20 mEq, Daily  spironolactone (ALDACTONE) tablet 50 mg, Daily  tiotropium (SPIRIVA RESPIMAT) 2.5 MCG/ACT inhaler 2 puff, Daily  furosemide (LASIX) 100 mg in dextrose 5 % 100 mL infusion, Continuous  traZODone (DESYREL) tablet 100 mg, Nightly PRN  glucose (GLUTOSE) 40 % oral gel 15 g, PRN  dextrose 50 % IV solution, PRN  glucagon (rDNA) injection 1 mg, PRN  dextrose 5 % solution, PRN  insulin lispro (HUMALOG) injection vial 0-12 Units, TID WC  insulin lispro (HUMALOG) injection vial 0-6 Units, Nightly  busPIRone (BUSPAR) tablet 30 mg, BID  insulin glargine (LANTUS) injection vial 18 Units, Nightly  morphine (PF) injection 2 mg, Q2H PRN    Or  morphine (PF) injection 4 mg, Q2H PRN  atorvastatin (LIPITOR) tablet 80 mg, Nightly  sodium chloride flush 0.9 % injection 10 mL, 2 times per day  sodium chloride flush 0.9 % injection 10 mL, PRN  acetaminophen (TYLENOL) tablet 650 mg, Q6H PRN    Or  acetaminophen (TYLENOL) suppository 650 mg, Q6H PRN  polyethylene glycol (GLYCOLAX) packet 17 g, Daily PRN  albuterol (PROVENTIL) nebulizer solution 2.5 mg, Q4H PRN  potassium chloride 10 mEq/100 mL IVPB (Peripheral Line), Once        Allergies:  Lisinopril     Review of Systems:   A 14 point review of symptoms completed. Pertinent positives identified in the HPI, all other review of symptoms negative as below.       Objective   PHYSICAL EXAM:    Vitals:    08/19/20 0800   BP: 121/85   Pulse: 95   Resp: 14   Temp: 98 °F (36.7 °C)   SpO2: 95%    Weight: 252 lb 3.2 oz (114.4 kg)         General Appearance:  Alert, cooperative, no distress, appears stated age   Head:  Normocephalic, without obvious abnormality, atraumatic   Eyes:  PERRL, conjunctiva/corneas clear   Nose: Nares normal, no drainage or sinus tenderness   Throat: Lips, mucosa, and tongue normal   Neck: Supple, symmetrical, trachea midline, no adenopathy, thyroid: not enlarged, symmetric, no tenderness/mass/nodules, elevated JVP   Lungs:    Bilateral decreased breath sounds, respirations unlabored   Chest Wall:  No deformity or tenderness   Heart:  Regular rate and rhythm, S1, S2 normal, distant heart sound   Abdomen:   Soft, non-tender, bowel sounds active all four quadrants,  no masses, no organomegaly   Extremities:  +3 to +4 bilateral lower extremity edema   Pulses: 2+ and symmetric in upper extremities   Skin: Skin color, texture, turgor normal, no rashes or lesions   Pysch: Normal mood and affect   Neurologic: Normal gross motor and sensory exam.         Labs   CBC:   Lab Results   Component Value Date    WBC 7.3 08/19/2020    RBC 3.72 08/19/2020    HGB 9.8 08/19/2020    HCT 30.1 08/19/2020    MCV 80.9 08/19/2020    RDW 24.6 08/19/2020     08/19/2020     CMP:  Lab Results   Component Value Date     08/19/2020    K 3.3 08/19/2020    K 2.8 08/18/2020    CL 85 08/19/2020    CO2 33 08/19/2020    BUN 53 08/19/2020    CREATININE 1.2 08/19/2020    GFRAA 56 08/19/2020    AGRATIO 0.9 08/18/2020 LABGLOM 46 08/19/2020    GLUCOSE 172 08/19/2020    PROT 7.8 08/18/2020    CALCIUM 9.4 08/19/2020    BILITOT 1.9 08/18/2020    ALKPHOS 282 08/18/2020    AST 16 08/18/2020    ALT 16 08/18/2020     PT/INR:  No results found for: PTINR  HgBA1c:  Lab Results   Component Value Date    LABA1C 7.2 06/03/2020     Lab Results   Component Value Date    TROPONINI 0.02 (H) 08/19/2020         Cardiac Data     Last EKG:     Normal sinus rhythm, left axis, right bundle branch block, anterior infarct, overall similar EKG to prior EKGs    Echo:    June 2020:    Summary   Technically difficult examination. Limited only f/u for LVEF. and mitral regurgitation. The left ventricular systolic function is severely reduced with an ejection   fraction of 25 %. There is hypokinesis of the apex, apical lateral, apical septum and   anteroseptum walls. Moderate mitral regurgitation. Stress Test:    Cath:    January 2020:    Left Heart Cath  Dominance : Right     LM: 70-80% short distal stenosis     LAD: 70-80% short ostial stenosis, extending into takeoff of high first diagonal; large proximal to mid stented segment patent with jailed second diagonal  (80% ostial D2) and 70% in stent restenosis of most distal stent; distal to near apical LAD with minimal disease     LCx: 70-80% short ostial stenosis, prior to patent proximal to mid stents      RCA: large, dominant vessel with long 60% proximal to mid stenosis      LVEDP: 4 mmHG  LVG not done due to CKD.          Impression/Recommendations:  Diabetic with previous LAD and LCx stenting in 2018 returns with unstable angina, in stent restenosis and Left Main bifurcation disease. Recommend CTS evaluation inpatient to discuss surgical revascularization option.    Hold Clopidogrel.       Studies:     Chest ray:       Impression    Small left pleural effusion with left basilar airspace disease, most likely    atelectasis.               I have reviewed labs and imaging/xray/diagnostic secondary uncontrolled ([facility]    Morbid obesity with BMI of 40.0-44.9, adult ([facility]    Multiple wounds of skin    Right upper quadrant pain       Right-sided pain, possibly related to right-sided heart failure which may be related to her left heart failure, will obtain follow-up echocardiogram and will need follow-up ischemic evaluation with Lexiscan nuclear stress test.    Acute on chronic systolic heart failure, would agree with Lasix drip. CAD/ASHD, status post CABG, continue aspirin    Hypercholesterolemia, continue statin    Ischemic cardiomyopathy, on spironolactone, no beta-blocker/ACE inhibitor due to hypotension/renal insufficiency. She has been on carvedilol in the past.  She is currently on Midrin. Will also obtain follow-up echocardiogram for LV function and might regurgitation reassessment. She may ultimately require cardiac catheterization. Thank you for allowing us to participate in the care of Noble Libman. Please call me with any questions [phone].     Sharron Hicks MD, [facility] [place]   Interventional Cardiologist  [address]  [phone] [facility]  [phone] [address] [place] [address]  8/19/2020 11:06 AM

## 2020-08-20 ENCOUNTER — HOSPITAL ENCOUNTER (OUTPATIENT)
Dept: WOUND CARE | Age: 55
Discharge: HOME | End: 2020-08-20
Payer: COMMERCIAL

## 2020-08-20 PROBLEM — I50.43 ACUTE ON CHRONIC COMBINED SYSTOLIC AND DIASTOLIC CONGESTIVE HEART FAILURE (HCC): Status: ACTIVE | Noted: 2020-01-10

## 2020-08-20 LAB
ALBUMIN SERPL-MCNC: 3.2 G/DL (ref 3.4–5)
ANION GAP SERPL CALCULATED.3IONS-SCNC: 13 MMOL/L (ref 3–16)
BUN BLDV-MCNC: 51 MG/DL (ref 7–20)
CALCIUM SERPL-MCNC: 9.3 MG/DL (ref 8.3–10.6)
CHLORIDE BLD-SCNC: 87 MMOL/L (ref 99–110)
CO2: 32 MMOL/L (ref 21–32)
CREAT SERPL-MCNC: 1.3 MG/DL (ref 0.6–1.1)
GFR AFRICAN AMERICAN: 51
GFR NON-AFRICAN AMERICAN: 42
GLUCOSE BLD-MCNC: 144 MG/DL (ref 70–99)
GLUCOSE BLD-MCNC: 149 MG/DL (ref 70–99)
GLUCOSE BLD-MCNC: 161 MG/DL (ref 70–99)
GLUCOSE BLD-MCNC: 163 MG/DL (ref 70–99)
GLUCOSE BLD-MCNC: 257 MG/DL (ref 70–99)
HCT VFR BLD CALC: 29.7 % (ref 36–48)
HEMOGLOBIN: 9.4 G/DL (ref 12–16)
MCH RBC QN AUTO: 25.8 PG (ref 26–34)
MCHC RBC AUTO-ENTMCNC: 31.6 G/DL (ref 31–36)
MCV RBC AUTO: 81.7 FL (ref 80–100)
PDW BLD-RTO: 23.9 % (ref 12.4–15.4)
PERFORMED ON: ABNORMAL
PHOSPHORUS: 3 MG/DL (ref 2.5–4.9)
PLATELET # BLD: 313 K/UL (ref 135–450)
PMV BLD AUTO: 8.4 FL (ref 5–10.5)
POTASSIUM SERPL-SCNC: 3.1 MMOL/L (ref 3.5–5.1)
RBC # BLD: 3.63 M/UL (ref 4–5.2)
SODIUM BLD-SCNC: 132 MMOL/L (ref 136–145)
WBC # BLD: 7 K/UL (ref 4–11)

## 2020-08-20 PROCEDURE — 99233 SBSQ HOSP IP/OBS HIGH 50: CPT | Performed by: NURSE PRACTITIONER

## 2020-08-20 PROCEDURE — 85027 COMPLETE CBC AUTOMATED: CPT

## 2020-08-20 PROCEDURE — 6360000002 HC RX W HCPCS: Performed by: NURSE PRACTITIONER

## 2020-08-20 PROCEDURE — 97116 GAIT TRAINING THERAPY: CPT

## 2020-08-20 PROCEDURE — 6360000002 HC RX W HCPCS: Performed by: INTERNAL MEDICINE

## 2020-08-20 PROCEDURE — 36415 COLL VENOUS BLD VENIPUNCTURE: CPT

## 2020-08-20 PROCEDURE — 94640 AIRWAY INHALATION TREATMENT: CPT

## 2020-08-20 PROCEDURE — 6370000000 HC RX 637 (ALT 250 FOR IP): Performed by: INTERNAL MEDICINE

## 2020-08-20 PROCEDURE — 1200000000 HC SEMI PRIVATE

## 2020-08-20 PROCEDURE — 97110 THERAPEUTIC EXERCISES: CPT

## 2020-08-20 PROCEDURE — 80069 RENAL FUNCTION PANEL: CPT

## 2020-08-20 PROCEDURE — 2580000003 HC RX 258: Performed by: INTERNAL MEDICINE

## 2020-08-20 RX ORDER — DOCUSATE SODIUM 100 MG/1
100 CAPSULE, LIQUID FILLED ORAL DAILY
Status: DISCONTINUED | OUTPATIENT
Start: 2020-08-20 | End: 2020-09-02 | Stop reason: HOSPADM

## 2020-08-20 RX ORDER — MORPHINE SULFATE 4 MG/ML
4 INJECTION, SOLUTION INTRAMUSCULAR; INTRAVENOUS
Status: COMPLETED | OUTPATIENT
Start: 2020-08-20 | End: 2020-08-20

## 2020-08-20 RX ORDER — POTASSIUM CHLORIDE 20 MEQ/1
20 TABLET, EXTENDED RELEASE ORAL 2 TIMES DAILY
Status: COMPLETED | OUTPATIENT
Start: 2020-08-20 | End: 2020-08-20

## 2020-08-20 RX ORDER — MORPHINE SULFATE 2 MG/ML
2 INJECTION, SOLUTION INTRAMUSCULAR; INTRAVENOUS
Status: COMPLETED | OUTPATIENT
Start: 2020-08-20 | End: 2020-08-20

## 2020-08-20 RX ADMIN — Medication 10 ML: at 20:42

## 2020-08-20 RX ADMIN — TRAMADOL HYDROCHLORIDE 50 MG: 50 TABLET, FILM COATED ORAL at 22:13

## 2020-08-20 RX ADMIN — MIDODRINE HYDROCHLORIDE 5 MG: 5 TABLET ORAL at 17:00

## 2020-08-20 RX ADMIN — FERROUS SULFATE TAB 325 MG (65 MG ELEMENTAL FE) 325 MG: 325 (65 FE) TAB at 17:00

## 2020-08-20 RX ADMIN — TRAZODONE HYDROCHLORIDE 100 MG: 50 TABLET ORAL at 20:33

## 2020-08-20 RX ADMIN — Medication 10 ML: at 08:41

## 2020-08-20 RX ADMIN — FUROSEMIDE 10 MG/HR: 10 INJECTION, SOLUTION INTRAMUSCULAR; INTRAVENOUS at 22:13

## 2020-08-20 RX ADMIN — BENZTROPINE MESYLATE 1 MG: 1 TABLET ORAL at 20:33

## 2020-08-20 RX ADMIN — MORPHINE SULFATE 4 MG: 4 INJECTION, SOLUTION INTRAMUSCULAR; INTRAVENOUS at 20:33

## 2020-08-20 RX ADMIN — MORPHINE SULFATE 2 MG: 2 INJECTION, SOLUTION INTRAMUSCULAR; INTRAVENOUS at 12:45

## 2020-08-20 RX ADMIN — PANTOPRAZOLE SODIUM 40 MG: 40 TABLET, DELAYED RELEASE ORAL at 08:32

## 2020-08-20 RX ADMIN — BUSPIRONE HYDROCHLORIDE 30 MG: 5 TABLET ORAL at 20:33

## 2020-08-20 RX ADMIN — POLYETHYLENE GLYCOL 3350 17 G: 17 POWDER, FOR SOLUTION ORAL at 20:55

## 2020-08-20 RX ADMIN — MORPHINE SULFATE 4 MG: 4 INJECTION, SOLUTION INTRAMUSCULAR; INTRAVENOUS at 04:37

## 2020-08-20 RX ADMIN — PANTOPRAZOLE SODIUM 40 MG: 40 TABLET, DELAYED RELEASE ORAL at 17:00

## 2020-08-20 RX ADMIN — LAMOTRIGINE 200 MG: 100 TABLET ORAL at 08:32

## 2020-08-20 RX ADMIN — MORPHINE SULFATE 4 MG: 4 INJECTION, SOLUTION INTRAMUSCULAR; INTRAVENOUS at 17:01

## 2020-08-20 RX ADMIN — MAGNESIUM OXIDE TAB 400 MG (241.3 MG ELEMENTAL MG) 400 MG: 400 (241.3 MG) TAB at 08:48

## 2020-08-20 RX ADMIN — CLOPIDOGREL BISULFATE 75 MG: 75 TABLET ORAL at 08:31

## 2020-08-20 RX ADMIN — SPIRONOLACTONE 50 MG: 25 TABLET ORAL at 08:49

## 2020-08-20 RX ADMIN — TRAMADOL HYDROCHLORIDE 50 MG: 50 TABLET, FILM COATED ORAL at 11:51

## 2020-08-20 RX ADMIN — ATORVASTATIN CALCIUM 80 MG: 80 TABLET, FILM COATED ORAL at 20:33

## 2020-08-20 RX ADMIN — POTASSIUM CHLORIDE 20 MEQ: 20 TABLET, EXTENDED RELEASE ORAL at 20:33

## 2020-08-20 RX ADMIN — ASPIRIN 81 MG: 81 TABLET, COATED ORAL at 08:31

## 2020-08-20 RX ADMIN — MORPHINE SULFATE 2 MG: 2 INJECTION, SOLUTION INTRAMUSCULAR; INTRAVENOUS at 08:48

## 2020-08-20 RX ADMIN — FUROSEMIDE 5 MG/HR: 10 INJECTION, SOLUTION INTRAMUSCULAR; INTRAVENOUS at 07:11

## 2020-08-20 RX ADMIN — INSULIN LISPRO 6 UNITS: 100 INJECTION, SOLUTION INTRAVENOUS; SUBCUTANEOUS at 17:06

## 2020-08-20 RX ADMIN — MIDODRINE HYDROCHLORIDE 5 MG: 5 TABLET ORAL at 08:32

## 2020-08-20 RX ADMIN — LAMOTRIGINE 200 MG: 100 TABLET ORAL at 20:33

## 2020-08-20 RX ADMIN — ARIPIPRAZOLE 10 MG: 10 TABLET ORAL at 08:48

## 2020-08-20 RX ADMIN — INSULIN LISPRO 2 UNITS: 100 INJECTION, SOLUTION INTRAVENOUS; SUBCUTANEOUS at 12:44

## 2020-08-20 RX ADMIN — FERROUS SULFATE TAB 325 MG (65 MG ELEMENTAL FE) 325 MG: 325 (65 FE) TAB at 08:32

## 2020-08-20 RX ADMIN — POTASSIUM CHLORIDE 20 MEQ: 20 TABLET, EXTENDED RELEASE ORAL at 17:00

## 2020-08-20 RX ADMIN — INSULIN GLARGINE 18 UNITS: 100 INJECTION, SOLUTION SUBCUTANEOUS at 20:32

## 2020-08-20 RX ADMIN — MORPHINE SULFATE 4 MG: 4 INJECTION, SOLUTION INTRAMUSCULAR; INTRAVENOUS at 14:57

## 2020-08-20 RX ADMIN — BUSPIRONE HYDROCHLORIDE 30 MG: 5 TABLET ORAL at 08:31

## 2020-08-20 RX ADMIN — POTASSIUM CHLORIDE 20 MEQ: 20 TABLET, EXTENDED RELEASE ORAL at 08:31

## 2020-08-20 RX ADMIN — TIOTROPIUM BROMIDE INHALATION SPRAY 2 PUFF: 3.12 SPRAY, METERED RESPIRATORY (INHALATION) at 08:16

## 2020-08-20 RX ADMIN — INSULIN LISPRO 2 UNITS: 100 INJECTION, SOLUTION INTRAVENOUS; SUBCUTANEOUS at 08:31

## 2020-08-20 ASSESSMENT — PAIN DESCRIPTION - ORIENTATION
ORIENTATION: RIGHT;UPPER

## 2020-08-20 ASSESSMENT — PAIN DESCRIPTION - PAIN TYPE
TYPE: ACUTE PAIN

## 2020-08-20 ASSESSMENT — PAIN SCALES - GENERAL
PAINLEVEL_OUTOF10: 3
PAINLEVEL_OUTOF10: 10
PAINLEVEL_OUTOF10: 9
PAINLEVEL_OUTOF10: 9
PAINLEVEL_OUTOF10: 10
PAINLEVEL_OUTOF10: 8
PAINLEVEL_OUTOF10: 9
PAINLEVEL_OUTOF10: 8
PAINLEVEL_OUTOF10: 10
PAINLEVEL_OUTOF10: 8
PAINLEVEL_OUTOF10: 3
PAINLEVEL_OUTOF10: 8
PAINLEVEL_OUTOF10: 9
PAINLEVEL_OUTOF10: 9
PAINLEVEL_OUTOF10: 3
PAINLEVEL_OUTOF10: 9

## 2020-08-20 ASSESSMENT — PAIN DESCRIPTION - PROGRESSION
CLINICAL_PROGRESSION: NOT CHANGED

## 2020-08-20 ASSESSMENT — PAIN DESCRIPTION - ONSET: ONSET: AWAKENED FROM SLEEP

## 2020-08-20 ASSESSMENT — PAIN DESCRIPTION - DESCRIPTORS: DESCRIPTORS: RADIATING

## 2020-08-20 ASSESSMENT — PAIN DESCRIPTION - LOCATION
LOCATION: ABDOMEN
LOCATION: ABDOMEN

## 2020-08-20 ASSESSMENT — PAIN DESCRIPTION - FREQUENCY: FREQUENCY: CONTINUOUS

## 2020-08-20 NOTE — PROGRESS NOTES
Physical Therapy  Facility/Department: [facility] B3 - MED SURG  Daily Treatment Note  NAME: Debra Sheikh  : 1965  MRN: [id number]    Date of Service: 2020    Discharge Recommendations:  Home with assist PRN, Home with Home health PT   PT Equipment Recommendations  Equipment Needed: No    Assessment   Body structures, Functions, Activity limitations: Decreased functional mobility ; Decreased strength;Decreased balance; Increased pain  Assessment: Pt pleasant and agreeable to PT tx reports buttocks and R sided pain but demonstrates good participation throughout session. Pt completes bed mobility with SBA, t/f without AD with SBA and ambulates x 100' without AD with SBA. Pt is limited in gait distances and mobility secondary to pain. Pt will benefit from continued skilled PT in acute care setting to address above deficits. Treatment Diagnosis: Difficulty walking  Specific instructions for Next Treatment: Progress ther ex and mobility as tolerated, assess stair amb prior to D/C home  Prognosis: Good  Decision Making: Low Complexity  PT Education: Goals;PT Role;Plan of Care;Precautions;Transfer Training;Disease Specific Education;Gait Training;General Safety; Functional Mobility Training  Patient Education: Disease-specific education: Pt educated on compensatory transfer/gait strategies in light of new-onset RUQ/R flank pain; pt verbalizes/demonstrates understanding. REQUIRES PT FOLLOW UP: Yes  Activity Tolerance  Activity Tolerance: Patient Tolerated treatment well;Patient limited by pain     Patient Diagnosis(es): There were no encounter diagnoses.      has a past medical history of Acute kidney injury ([address] Acute on chronic congestive heart failure ([address] Alcohol dependence ([address] Bipolar 1 disorder ([address] CAD (coronary artery disease), Cardiomyopathy ([address] Cellulitis, CHF (congestive heart failure) ([address] COPD (chronic obstructive pulmonary disease) ([address] Diabetic ulcer of left foot associated with type 2 diabetes mellitus ([facility] Diabetic ulcer of toe of right foot associated with type 2 diabetes mellitus ([facility] GERD (gastroesophageal reflux disease), High cholesterol, HTN (hypertension), Kidney disease, chronic, stage II (mild, EGFR 60+ ml/min), MI (myocardial infarction) ([facility] Positive FIT (fecal immunochemical test), Pulmonary nodule, and PVD (peripheral vascular disease) ([facility] has a past surgical history that includes Tonsillectomy; Cholecystectomy; tumor excision; Upper gastrointestinal endoscopy (4/25/2013); Upper gastrointestinal endoscopy (12/10/2015); Upper gastrointestinal endoscopy (01/06/2017); Arterial bypass surgry (Left, 01/06/2016); Cardiac catheterization (03/27/2018); Coronary angioplasty with stent (03/16/2018); Rotator cuff repair (Left, 04/22/2016); Coronary angioplasty with stent (01/03/2018); Insertable Cardiac Monitor (02/14/2019); femoral bypass (Right, 5/16/2019); transluminal angioplasty (Left, 10/08/2019); Cardiac catheterization (01/20/2020); Coronary artery bypass graft (N/A, 1/27/2020); vascular surgery (Left, 12/08/2015); transluminal angioplasty (Left, 04/29/2020); vascular surgery (Bilateral, 07/07/2020); Coronary angioplasty with stent (10/07/2019); transesophageal echocardiogram (01/26/2020); and Kidney surgery. Restrictions  Restrictions/Precautions  Restrictions/Precautions: General Precautions, Fall Risk  Position Activity Restriction  Other position/activity restrictions: High fall risk per nursing assessment, up with assistance, IV lines, [place]  Subjective   General  Chart Reviewed: Yes  Additional Pertinent Hx: s/p L renal artery angioplasty and stent at [facility] 8/04/20  Response To Previous Treatment: Patient reporting fatigue but able to participate.   Family / Caregiver Present: No  Referring Practitioner: Dr. Dong Cruz: Pt supine in bed on approach, drowsy but pleasant and agreeable to PT tx  General Comment  Comments: RN cleared pt for session  Pain Screening  Patient Currently in Pain: Yes  Pain Assessment  Pain Assessment: 0-10  Pain Level: 10  Pain Type: Acute pain  Pain Location: Abdomen;Flank; Buttocks  Pain Orientation: Right;Upper  Non-Pharmaceutical Pain Intervention(s): Ambulation/Increased Activity; Therapeutic presence;Repositioned  Vital Signs  Patient Currently in Pain: Yes       Orientation  Orientation  Overall Orientation Status: Within Normal Limits  Cognition   Cognition  Overall Cognitive Status: WFL     Objective   Bed mobility  Supine to Sit: Stand by assistance(To R, HOB elevated, use of BR, increased time to complete secondary to pain)  Sit to Supine: Unable to assess(Pt seated in chair at end of session)     Transfers  Sit to Stand: Stand by assistance  Stand to sit: Stand by assistance  Comment: without AD     Ambulation  Ambulation?: Yes  Ambulation 1  Surface: level tile  Device: No Device  Assistance: Contact guard assistance;Stand by assistance  Quality of Gait: Pt amb with slow tamir with widened GABINO and increased degree of med<>lat postural sway during stance phase. Pt fairly steady despite slow pace; no LOB. Gait Deviations: Slow Tamir; Increased GABINO; Decreased step length;Decreased step height  Distance: 100'  Comments: Distances limited secondary to pain  Stairs/Curb  Stairs?: No        Balance  Posture: Fair  Sitting - Static: Good  Sitting - Dynamic: Good;-  Standing - Static: Good;-  Standing - Dynamic: Fair;+  Comments:  Without AD     Exercises  Gluteal Sets: Seated BLE x 15  Hip Flexion: Seated marches BLE x 15  Hip Abduction: Seated clamshells BLE x 15  Knee Long Arc Quad: Seated BLE x 15  Ankle Pumps: Seated BLE x 15  Comments: Cues for sequence and technique                       AM-PAC Score  AM-PAC Inpatient Mobility Raw Score : 22 (08/20/20 1313)  AM-PAC Inpatient T-Scale Score : 53.28 (08/20/20 1313)  Mobility Inpatient CMS 0-100% Score: 20.91 (08/20/20 1313)  Mobility Inpatient CMS G-Code Modifier : Rabia Chantel (08/20/20 1313)          Goals  Short term goals  Time Frame for Short term goals: 1 week, 8/26/20 (unless otherwise specified)  Short term goal 1: Pt will transfer supine <-> sit with modified(I) -- 8/20: Supine to sit SBA  Short term goal 2: Pt will transfer sit <-> stand and bed>chair with modified(I) -- 8/20: SBA without AD  Short term goal 3: Pt will ambulate x 200 feet without AD with supervision/modified(I) -- 8/20: x 100' no AD SBA/CGA  Short term goal 4: By 8/21/20: Pt will tolerate 12-15 reps BLE exercise for strengthening, balance, and endurance -- 8/20: GOAL MET: x 15 reps seated BLE exercises  Short term goal 5: Pt will ambulate up/down 4 steps using B handrails with SBA -- 8/20: DNT  Patient Goals   Patient goals : \"To walk better and go home\"    Plan    Plan  Times per week: 3-5x/week in acute care  Times per day: Daily  Specific instructions for Next Treatment: Progress ther ex and mobility as tolerated, assess stair amb prior to D/C home  Current Treatment Recommendations: Strengthening, Balance Training, Functional Mobility Training, Transfer Training, Gait Training, Home Exercise Program, Safety Education & Training, Pain Management, Stair training  Safety Devices  Type of devices: All fall risk precautions in place, Left in chair, Call light within reach, Chair alarm in place, Nurse notified, Gait belt, Patient at risk for falls     Therapy Time   Individual Concurrent Group Co-treatment   Time In 1125         Time Out 1150         Minutes 25         Timed Code Treatment Minutes: 25 Minutes     If pt is unable to be seen after this session, please let this note serve as discharge summary. Please see case management note for discharge disposition. Thank you.     Jaqueline Waterman, PT ,DPT

## 2020-08-20 NOTE — PROGRESS NOTES
[facility] [address]   Daily Progress Note    Admit Date:  8/18/2020  HPI:   Presented with right sided abdominal pain radiating to right chest, associated with shortness of breath, weight gain and swelling. Troponin and BNP elevated. Hx of CAD s/p PCI to LAD and LCx 2018, CABG X1 (LIMA to LAD,Jan 2020 for restenosis of LAD and LCx and significant dz in RCA). Hx of ICM, sCHF s/p CardioMEMs implant (2/2019), MR, PAD, CKD, DM, COPD, leg wounds. She was recently admitted following left renal artery angioplasty and stenting complicated by perinephric hematoma requiring coil embolization of bleeding vessel. Subjective:  Ms. Susy Macedo seen lying in bed, reports miserable. Still with right upper quadrant pain, worse with deep breath, movement and palpation. Objective:   /66   Pulse 96   Temp 97.7 °F (36.5 °C) (Oral)   Resp 22   Ht 5' 4.5\" (1.638 m)   Wt 252 lb 8 oz (114.5 kg)   LMP 10/24/2012   SpO2 92%   BMI 42.67 kg/m²       Intake/Output Summary (Last 24 hours) at 8/20/2020 0846  Last data filed at 8/20/2020 0720  Gross per 24 hour   Intake 880 ml   Output 2875 ml   Net -1995 ml     Wt Readings from Last 3 Encounters:   08/20/20 252 lb 8 oz (114.5 kg)   08/18/20 250 lb (113.4 kg)   08/12/20 255 lb 1.6 oz (115.7 kg)         ASSESSMENT:   1. CHF, acute on chronic combined and right heart failure - remains fluid overloaded, on Lasix gtt at 5 mg/hr  2. Cardiomyopathy, ischemic - EF 25% by limited echo, unchanged; not on ACEi/ARB/ ARNI due to hypotension and CKD  3. CAD s/p PCI's and CABG - for stress test today, CABG to LAD, LCx and RCA still with disease, on high dose statin, DAPT  4. Hypotension - on midodrine, stable  5. HLD - on high dose statin  6. CKD3 - overall stable with diuresis, appreciate nephrology input  7. Hyponatremia - improving  8. Hypokalemia - replacement per nephrology  9. DM - stable      PLAN:  1.  Discussed with Dr. Andrade Daily and shared PA pressures - will adjust Lasix Recent Labs     08/18/20  1200   INR 1.23*     BNP:    Recent Labs     08/18/20  1200   PROBNP 5,283*     Cardiac Enzymes:   Recent Labs     08/18/20  1200 08/18/20  2125 08/19/20  0647   TROPONINI 0.02* 0.02* 0.02*     Lipids:   Lab Results   Component Value Date    TRIG 78 01/27/2020    TRIG 140 01/11/2020    HDL 21 01/27/2020    HDL 27 01/11/2020    LDLCALC 21 01/27/2020    LDLCALC 103 01/11/2020    LDLDIRECT 187 08/01/2017       Cardiac Imaging:   LIMITED ECHO 8/19/20:    Summary   Limited only f/u for LVEF and mitral regurgitation. Technically difficult examination. The left ventricular systolic function is severely reduced with an ejection fraction of 25%. There is hypokinesis of the apex, apical lateral, apical septum, anterioseptum and anterior walls. Compared to previous study from 7-1-2020 no changes noted in left ventricular function. Moderate mitral regurgitation. LIMITED ECHO 6/29/20:    Summary   Technically difficult examination. Limited only f/u for LVEF. and mitral regurgitation. The left ventricular systolic function is severely reduced with an ejection fraction of 25 %. There is hypokinesis of the apex, apical lateral, apical septum and anteroseptum walls. Moderate mitral regurgitation. Surgery: 1/27/20 Urgent coronary artery bypass grafting surgery x1 with pedicled left internal mammary artery to the LAD. Cardiopulmonary bypass with on-pump beating-heart technique, no cross-clamp. Transesophageal echo. Epiaortic ultrasound. Pomerene-Jurgen catheter placement. Doppler verification of grafts. Bilateral five-level intercostal nerve block with Exparel. Platelet gel application. Sternal plating. Vas-Cath placement. URI 1/24/20:    Summary   Ejection fraction is visually estimated at 35-40%. Moderate to severe mitral regurgitation with multiple jets visualized. ERO estimated at 0.29 cm2. Mild tricuspid regurgitation.    The left atrial appendage shows evidence of thrombus formation and low flow velocities. Moderate amount of plaque in the aorta      CARDIAC CATH 1/20/2020:   Left Heart Cath  Dominance : Right   LM: 70-80% short distal stenosis   LAD: 70-80% short ostial stenosis, extending into takeoff of high first diagonal; large proximal to mid stented segment patent with jailed second diagonal  (80% ostial D2) and 70% in stent restenosis of most distal stent; distal to near apical LAD with minimal disease   LCx: 70-80% short ostial stenosis, prior to patent proximal to mid stents    RCA: large, dominant vessel with long 60% proximal to mid stenosis    LVEDP: 4 mmHG  LVG not done due to CKD.     Impression/Recommendations:  Diabetic with previous LAD and LCx stenting in 2018 returns with unstable angina, in stent restenosis and Left Main bifurcation disease. Recommend CTS evaluation inpatient to discuss surgical revascularization option. Hold Clopidogrel. ECHO 4/3/19:   Velma Amaya systolic function is mildly reduced with EF estimated at 40-45%.   There is severe HK of the apex and apical-septal segment.   Normal left ventricular diastolic filling pressure.   Mild mitral regurgitation.   Systolic pulmonary artery pressure (SPAP) estimated at 32mmHg (RA pressure 3mmHg). [facility] 3/26/18 [employer] promus premier [id number] CCx and [id number] CCx.

## 2020-08-20 NOTE — PROGRESS NOTES
for late recurrence of HTN (post-perinephric bleed) (I.e, Page kidney).     Anemia - blood loss. - Hgb stabilized in 8-9 range now  - S/P transfusion 2 units PRBC's last admission, oral Fe added.     Hyponatremia  - Has been present consistently since 4/20   - ?  Etiology - likely multifactorial with pain, volume excess / CHF       - CHF and ? lamictal may be cause of chronic hyponatremia  - Rec'd Tolvaptan last admission with sodium as low as 120, off metolazone     Hypokalemia.  - Scheduled K replacement 20 meq a day with prn replacement today

## 2020-08-20 NOTE — PROGRESS NOTES
Hospitalist Progress Note      PCP: Jose Sanchez PA-C    Date of Admission: 8/18/2020    Chief Complaint: RUQ abdominal pain    Hospital Course: see H&P     Subjective:   Patient is up in bed, comfortable. Not in distress. Denies any chest pain or SOB. No new event overnight.       Medications:  Reviewed    Infusion Medications    furosemide (LASIX) 1mg/ml infusion 10 mg/hr (08/20/20 1151)    dextrose       Scheduled Medications    potassium chloride  20 mEq Oral BID    docusate sodium  100 mg Oral Daily    ARIPiprazole  10 mg Oral Daily    aspirin EC  81 mg Oral Daily    benztropine  1 mg Oral Nightly    buprenorphine-naloxone  1 Film Sublingual BID    clopidogrel  75 mg Oral Daily    ferrous sulfate  325 mg Oral BID WC    lamoTRIgine  200 mg Oral BID    magnesium oxide  400 mg Oral Daily    midodrine  5 mg Oral BID WC    pantoprazole  40 mg Oral BID    potassium chloride  20 mEq Oral Daily    spironolactone  50 mg Oral Daily    tiotropium  2 puff Inhalation Daily    insulin lispro  0-12 Units Subcutaneous TID WC    insulin lispro  0-6 Units Subcutaneous Nightly    busPIRone  30 mg Oral BID    insulin glargine  18 Units Subcutaneous Nightly    atorvastatin  80 mg Oral Nightly    sodium chloride flush  10 mL Intravenous 2 times per day    potassium chloride  10 mEq Intravenous Once     PRN Meds: morphine **OR** morphine, perflutren lipid microspheres, regadenoson, traMADol, traZODone, glucose, dextrose, glucagon (rDNA), dextrose, sodium chloride flush, acetaminophen **OR** acetaminophen, polyethylene glycol, albuterol      Intake/Output Summary (Last 24 hours) at 8/20/2020 1224  Last data filed at 8/20/2020 1122  Gross per 24 hour   Intake 880 ml   Output 3425 ml   Net -2545 ml       Physical Exam Performed:    /80   Pulse 91   Temp 97.2 °F (36.2 °C) (Oral)   Resp 22   Ht 5' 4.5\" (1.638 m)   Wt 252 lb 8 oz (114.5 kg)   LMP 10/24/2012   SpO2 95%   BMI 42.67 kg/m² General appearance: No apparent distress, appears stated age and cooperative. HEENT: Pupils equal, round, and reactive to light. Conjunctivae/corneas clear. Neck: Supple, with full range of motion. No jugular venous distention. Trachea midline. Respiratory:  Normal respiratory effort. Clear to auscultation, bilaterally without Rales/Wheezes/Rhonchi. Cardiovascular: Regular rate and rhythm with normal S1/S2 without murmurs, rubs or gallops. Abdomen: Soft, non-tender, non-distended with normal bowel sounds. Musculoskeletal: No clubbing, cyanosis or edema bilaterally. Full range of motion without deformity. Skin: Skin color, texture, turgor normal.  No rashes or lesions. Neurologic:  Neurovascularly intact without any focal sensory/motor deficits.  Cranial nerves: II-XII intact, grossly non-focal.  Psychiatric: Alert and oriented, thought content appropriate, normal insight  Capillary Refill: Brisk,< 3 seconds   Peripheral Pulses: +2 palpable, equal bilaterally       Labs:   Recent Labs     08/18/20  1200 08/19/20  0647 08/20/20  0706   WBC 10.4 7.3 7.0   HGB 10.0* 9.8* 9.4*   HCT 31.6* 30.1* 29.7*    307 313     Recent Labs     08/18/20  1200 08/18/20 2125 08/19/20  0647 08/20/20  0706   *  --  131* 132*   K 2.5* 2.8* 3.3* 3.1*   CL 80*  --  85* 87*   CO2 34*  --  33* 32   BUN 62*  --  53* 51*   CREATININE 1.3*  --  1.2* 1.3*   CALCIUM 9.3  --  9.4 9.3   PHOS  --   --   --  3.0     Recent Labs     08/18/20  1200   AST 16   ALT 16   BILITOT 1.9*   ALKPHOS 282*     Recent Labs     08/18/20  1200   INR 1.23*     Recent Labs     08/18/20  1200 08/18/20 2125 08/19/20  0647   TROPONINI 0.02* 0.02* 0.02*       Urinalysis:      Lab Results   Component Value Date    NITRU Negative 08/18/2020    WBCUA 0-2 08/18/2020    BACTERIA Rare 08/18/2020    RBCUA 0-2 08/18/2020    BLOODU Negative 08/18/2020    SPECGRAV 1.010 08/18/2020    GLUCOSEU Negative 08/18/2020       Radiology:  NM LUNG SCAN PERFUSION ONLY Final Result   Low probability for pulmonary embolism. NM Cardiac Stress Test Nuclear Imaging    (Results Pending)           Assessment/Plan:    Active Hospital Problems    Diagnosis    Acute on chronic right-sided heart failure (HCC) [I50.813]    Right upper quadrant pain [R10.11]    Acute on chronic combined systolic and diastolic congestive heart failure (HCC) [I50.43]     The patient is a pleasant, chronically ill-appearing 62 Y F with a h/o COPD, HTN, HLD, DM2, CAD s/p stents and CABG, ischemic cardiomyopathy, and CHF. She has extensive vascular disease and is s/p a LUE bypass, a LLE angioplasty (then again when it reoccluded), and a RLE bypass. She continues to smoke. She was recently diagnosed with L renal artery stenosis and underwent a L renal artery angioplasty and stent here on 8/4. She was discharged home that day but returned to the ED a few hours later with L flank pain and was admitted with a L perinephric subcapsular hematoma. She required 2u pRBCs and a coil embolization of a L renal artery branch on 8/5. Her hospitalization was complicated by SHAUNNA and hyponatremia, and with the help of nephrology each of these issues improved a little by the time she was discharged on 8/12. Now the patient presents with new sharp RUQ pain which radiates to her R posterior flank, R chest, and R shoulder. It started around 8pm last night, and she denies having any pain in this area previously. It hurts her badly to breath or move. She is very tender in the RUQ. She is s/p cholecystectomy. Her LFTs have been newly abnormal for the last couple weeks.        RUQ pain, suspect due to congestive hepatopathy, due to acute on chronic systolic CHF  - she had been on spironolactone and PRN metolazone. Then last admission she was discharged with spironolactone and daily torsemide. Started with furosemide gtt here. - no ACE/ARB/ARNI due to unstable renal status.   Start BB if BP allows for this, but would first wean off her chronic midodrine if possible. - cardiology consulted, input noted. Clinically improving with IV Lasix.     - she has chronic nonspecific pains and is on suboxone.      CKD3  - baseline Cr is probably about 1.6, but she often sustains values closer to 2. Monitor during diuresis. Consult Nephrology.     Hyponatremia  - it looks like her baseline is in the high 120's, even when accounting for hyperglycemia. After a week of aggressive management, including multiple doses of tolvaptan, she still was only discharged at 126 last time. Monitor response to diuresis and consider nephrology consultation again if this isn't improving. Continuing IV Lasix, nephrology input noted.     CAD  - aspirin, clopidogrel. OK to continue statin.     DM2  - adjusted insulin regimen while here. Recent A1c 7.2.     14.7 cm L perinephric subcapsular hematoma was stable on repeat CT on this admission. Monitor.     DVT Prophylaxis: SCD  Diet: Diet NPO Time Specified  Code Status: Full Code    PT/OT Eval Status: Not ordered    Dispo - in 3-5 days    Dean Rojas MD

## 2020-08-20 NOTE — PROGRESS NOTES
VSS - afebrile. Pt is alert and oriented x 4 with history of falls. Assessment completed as charted. Bed is in lowest position with 2/4 bed rails raised, wheels locked and call light within reach - patient wearing non-skid socks and verbalizes understanding to call out for assistance. No further requests at this time. Will continue to monitor.    Vitals:    08/19/20 2330   BP: 99/69   Pulse: 93   Resp: 20   Temp: 98.2 °F (36.8 °C)   SpO2: 94%

## 2020-08-20 NOTE — ADT AUTH CERT
function panel, cbc daily       Per IM PN:    Assessment/Plan:         Active Hospital Problems      Diagnosis    · Right upper quadrant pain [R10.11]       The patient is a pleasant, chronically ill-appearing 62 Y F with a h/o COPD, HTN, HLD, DM2, CAD s/p stents and CABG, ischemic cardiomyopathy, and CHF.  She has extensive vascular disease and is s/p a LUE bypass, a LLE angioplasty (then again when it reoccluded), and a RLE bypass.  She continues to smoke.  She was recently diagnosed with L renal artery stenosis and underwent a L renal artery angioplasty and stent here on 8/4.  She was discharged home that day but returned to the ED a few hours later with L flank pain and was admitted with a L perinephric subcapsular hematoma.  She required 2u pRBCs and a coil embolization of a L renal artery branch on 8/5. Janet Lanier hospitalization was complicated by SHAUNNA and hyponatremia, and with the help of nephrology each of these issues improved a little by the time she was discharged on 8/12.                Now the patient presents with new sharp RUQ pain which radiates to her R posterior flank, R chest, and R shoulder.  It started around 8pm last night, and she denies having any pain in this area previously.  It hurts her badly to breath or move.  She is very tender in the RUQ.  She is s/p cholecystectomy.  Her LFTs have been newly abnormal for the last couple weeks.              RUQ pain, suspect due to congestive hepatopathy, due to acute on chronic systolic CHF    - she had been on spironolactone and PRN metolazone.  Then last admission she was discharged with spironolactone and daily torsemide.  Started with furosemide gtt here.      - no ACE/ARB/ARNI due to unstable renal status.  Start BB if BP allows for this, but would first wean off her chronic midodrine if possible.     - cardiology consulted to try and get CardioMEMs data         Other considerations:    - LFTs have somewhat of a cholestatic pattern.  If the LFTs and

## 2020-08-21 ENCOUNTER — APPOINTMENT (OUTPATIENT)
Dept: NUCLEAR MEDICINE | Age: 55
DRG: 182 | End: 2020-08-21
Attending: INTERNAL MEDICINE
Payer: COMMERCIAL

## 2020-08-21 LAB
ALBUMIN SERPL-MCNC: 3.1 G/DL (ref 3.4–5)
ANION GAP SERPL CALCULATED.3IONS-SCNC: 13 MMOL/L (ref 3–16)
BUN BLDV-MCNC: 55 MG/DL (ref 7–20)
CALCIUM SERPL-MCNC: 9.1 MG/DL (ref 8.3–10.6)
CHLORIDE BLD-SCNC: 85 MMOL/L (ref 99–110)
CO2: 31 MMOL/L (ref 21–32)
CREAT SERPL-MCNC: 1.6 MG/DL (ref 0.6–1.1)
GFR AFRICAN AMERICAN: 40
GFR NON-AFRICAN AMERICAN: 33
GLUCOSE BLD-MCNC: 155 MG/DL (ref 70–99)
GLUCOSE BLD-MCNC: 166 MG/DL (ref 70–99)
GLUCOSE BLD-MCNC: 200 MG/DL (ref 70–99)
GLUCOSE BLD-MCNC: 233 MG/DL (ref 70–99)
GLUCOSE BLD-MCNC: 302 MG/DL (ref 70–99)
HCT VFR BLD CALC: 29.2 % (ref 36–48)
HEMOGLOBIN: 9.3 G/DL (ref 12–16)
LV EF: 51 %
LVEF MODALITY: NORMAL
MCH RBC QN AUTO: 26.2 PG (ref 26–34)
MCHC RBC AUTO-ENTMCNC: 32 G/DL (ref 31–36)
MCV RBC AUTO: 81.6 FL (ref 80–100)
PDW BLD-RTO: 24.3 % (ref 12.4–15.4)
PERFORMED ON: ABNORMAL
PHOSPHORUS: 3.2 MG/DL (ref 2.5–4.9)
PLATELET # BLD: 317 K/UL (ref 135–450)
PMV BLD AUTO: 8.2 FL (ref 5–10.5)
POTASSIUM SERPL-SCNC: 3.7 MMOL/L (ref 3.5–5.1)
RBC # BLD: 3.57 M/UL (ref 4–5.2)
SODIUM BLD-SCNC: 129 MMOL/L (ref 136–145)
WBC # BLD: 7.5 K/UL (ref 4–11)

## 2020-08-21 PROCEDURE — 6360000002 HC RX W HCPCS: Performed by: NURSE PRACTITIONER

## 2020-08-21 PROCEDURE — A9502 TC99M TETROFOSMIN: HCPCS | Performed by: INTERNAL MEDICINE

## 2020-08-21 PROCEDURE — 85027 COMPLETE CBC AUTOMATED: CPT

## 2020-08-21 PROCEDURE — 36415 COLL VENOUS BLD VENIPUNCTURE: CPT

## 2020-08-21 PROCEDURE — 78452 HT MUSCLE IMAGE SPECT MULT: CPT

## 2020-08-21 PROCEDURE — 80069 RENAL FUNCTION PANEL: CPT

## 2020-08-21 PROCEDURE — 6370000000 HC RX 637 (ALT 250 FOR IP): Performed by: INTERNAL MEDICINE

## 2020-08-21 PROCEDURE — 97116 GAIT TRAINING THERAPY: CPT

## 2020-08-21 PROCEDURE — 2580000003 HC RX 258: Performed by: INTERNAL MEDICINE

## 2020-08-21 PROCEDURE — 97110 THERAPEUTIC EXERCISES: CPT

## 2020-08-21 PROCEDURE — 93017 CV STRESS TEST TRACING ONLY: CPT

## 2020-08-21 PROCEDURE — 6360000002 HC RX W HCPCS: Performed by: INTERNAL MEDICINE

## 2020-08-21 PROCEDURE — 3430000000 HC RX DIAGNOSTIC RADIOPHARMACEUTICAL: Performed by: INTERNAL MEDICINE

## 2020-08-21 PROCEDURE — 99233 SBSQ HOSP IP/OBS HIGH 50: CPT | Performed by: NURSE PRACTITIONER

## 2020-08-21 PROCEDURE — 1200000000 HC SEMI PRIVATE

## 2020-08-21 RX ORDER — MORPHINE SULFATE 4 MG/ML
4 INJECTION, SOLUTION INTRAMUSCULAR; INTRAVENOUS
Status: COMPLETED | OUTPATIENT
Start: 2020-08-21 | End: 2020-08-21

## 2020-08-21 RX ORDER — MORPHINE SULFATE 2 MG/ML
2 INJECTION, SOLUTION INTRAMUSCULAR; INTRAVENOUS
Status: COMPLETED | OUTPATIENT
Start: 2020-08-21 | End: 2020-08-21

## 2020-08-21 RX ORDER — POTASSIUM CHLORIDE 20 MEQ/1
20 TABLET, EXTENDED RELEASE ORAL ONCE
Status: COMPLETED | OUTPATIENT
Start: 2020-08-21 | End: 2020-08-21

## 2020-08-21 RX ADMIN — ATORVASTATIN CALCIUM 80 MG: 80 TABLET, FILM COATED ORAL at 20:22

## 2020-08-21 RX ADMIN — MAGNESIUM OXIDE TAB 400 MG (241.3 MG ELEMENTAL MG) 400 MG: 400 (241.3 MG) TAB at 11:54

## 2020-08-21 RX ADMIN — MORPHINE SULFATE 4 MG: 4 INJECTION, SOLUTION INTRAMUSCULAR; INTRAVENOUS at 04:20

## 2020-08-21 RX ADMIN — MIDODRINE HYDROCHLORIDE 5 MG: 5 TABLET ORAL at 11:38

## 2020-08-21 RX ADMIN — INSULIN LISPRO 4 UNITS: 100 INJECTION, SOLUTION INTRAVENOUS; SUBCUTANEOUS at 16:53

## 2020-08-21 RX ADMIN — PANTOPRAZOLE SODIUM 40 MG: 40 TABLET, DELAYED RELEASE ORAL at 15:53

## 2020-08-21 RX ADMIN — POLYETHYLENE GLYCOL 3350 17 G: 17 POWDER, FOR SOLUTION ORAL at 13:29

## 2020-08-21 RX ADMIN — REGADENOSON 0.4 MG: 0.08 INJECTION, SOLUTION INTRAVENOUS at 08:17

## 2020-08-21 RX ADMIN — BUSPIRONE HYDROCHLORIDE 30 MG: 5 TABLET ORAL at 11:40

## 2020-08-21 RX ADMIN — DOCUSATE SODIUM 100 MG: 100 CAPSULE, LIQUID FILLED ORAL at 11:40

## 2020-08-21 RX ADMIN — FERROUS SULFATE TAB 325 MG (65 MG ELEMENTAL FE) 325 MG: 325 (65 FE) TAB at 11:39

## 2020-08-21 RX ADMIN — INSULIN GLARGINE 18 UNITS: 100 INJECTION, SOLUTION SUBCUTANEOUS at 20:22

## 2020-08-21 RX ADMIN — LAMOTRIGINE 200 MG: 100 TABLET ORAL at 11:39

## 2020-08-21 RX ADMIN — Medication 10 ML: at 13:28

## 2020-08-21 RX ADMIN — MORPHINE SULFATE 2 MG: 2 INJECTION, SOLUTION INTRAMUSCULAR; INTRAVENOUS at 13:28

## 2020-08-21 RX ADMIN — FUROSEMIDE 20 MG/HR: 10 INJECTION, SOLUTION INTRAMUSCULAR; INTRAVENOUS at 22:22

## 2020-08-21 RX ADMIN — CLOPIDOGREL BISULFATE 75 MG: 75 TABLET ORAL at 11:39

## 2020-08-21 RX ADMIN — ASPIRIN 81 MG: 81 TABLET, COATED ORAL at 11:40

## 2020-08-21 RX ADMIN — MORPHINE SULFATE 4 MG: 4 INJECTION, SOLUTION INTRAMUSCULAR; INTRAVENOUS at 16:53

## 2020-08-21 RX ADMIN — LAMOTRIGINE 200 MG: 100 TABLET ORAL at 20:22

## 2020-08-21 RX ADMIN — Medication 10 ML: at 04:21

## 2020-08-21 RX ADMIN — ARIPIPRAZOLE 10 MG: 10 TABLET ORAL at 11:51

## 2020-08-21 RX ADMIN — MORPHINE SULFATE 2 MG: 2 INJECTION, SOLUTION INTRAMUSCULAR; INTRAVENOUS at 22:22

## 2020-08-21 RX ADMIN — INSULIN LISPRO 8 UNITS: 100 INJECTION, SOLUTION INTRAVENOUS; SUBCUTANEOUS at 11:40

## 2020-08-21 RX ADMIN — INSULIN LISPRO 2 UNITS: 100 INJECTION, SOLUTION INTRAVENOUS; SUBCUTANEOUS at 20:23

## 2020-08-21 RX ADMIN — SPIRONOLACTONE 50 MG: 25 TABLET ORAL at 11:40

## 2020-08-21 RX ADMIN — FERROUS SULFATE TAB 325 MG (65 MG ELEMENTAL FE) 325 MG: 325 (65 FE) TAB at 15:53

## 2020-08-21 RX ADMIN — PANTOPRAZOLE SODIUM 40 MG: 40 TABLET, DELAYED RELEASE ORAL at 11:39

## 2020-08-21 RX ADMIN — POTASSIUM CHLORIDE 20 MEQ: 20 TABLET, EXTENDED RELEASE ORAL at 11:40

## 2020-08-21 RX ADMIN — TETROFOSMIN 31 MILLICURIE: 1.38 INJECTION, POWDER, LYOPHILIZED, FOR SOLUTION INTRAVENOUS at 08:16

## 2020-08-21 RX ADMIN — BENZTROPINE MESYLATE 1 MG: 1 TABLET ORAL at 20:22

## 2020-08-21 RX ADMIN — BUSPIRONE HYDROCHLORIDE 30 MG: 5 TABLET ORAL at 20:22

## 2020-08-21 RX ADMIN — MIDODRINE HYDROCHLORIDE 5 MG: 5 TABLET ORAL at 15:53

## 2020-08-21 RX ADMIN — POTASSIUM CHLORIDE 20 MEQ: 20 TABLET, EXTENDED RELEASE ORAL at 15:53

## 2020-08-21 RX ADMIN — MORPHINE SULFATE 4 MG: 4 INJECTION, SOLUTION INTRAMUSCULAR; INTRAVENOUS at 11:41

## 2020-08-21 RX ADMIN — MORPHINE SULFATE 2 MG: 2 INJECTION, SOLUTION INTRAMUSCULAR; INTRAVENOUS at 19:01

## 2020-08-21 ASSESSMENT — PAIN SCALES - GENERAL
PAINLEVEL_OUTOF10: 7
PAINLEVEL_OUTOF10: 0
PAINLEVEL_OUTOF10: 8
PAINLEVEL_OUTOF10: 10
PAINLEVEL_OUTOF10: 9
PAINLEVEL_OUTOF10: 3
PAINLEVEL_OUTOF10: 9
PAINLEVEL_OUTOF10: 8
PAINLEVEL_OUTOF10: 7

## 2020-08-21 ASSESSMENT — PAIN DESCRIPTION - PAIN TYPE
TYPE: ACUTE PAIN
TYPE: ACUTE PAIN

## 2020-08-21 ASSESSMENT — PAIN DESCRIPTION - PROGRESSION
CLINICAL_PROGRESSION: NOT CHANGED

## 2020-08-21 ASSESSMENT — PAIN DESCRIPTION - LOCATION
LOCATION: ABDOMEN
LOCATION: ABDOMEN

## 2020-08-21 NOTE — PROGRESS NOTES
[facility] 79   Daily Progress Note    Admit Date:  8/18/2020  HPI:   Presented with right sided abdominal pain radiating to right chest, associated with shortness of breath, weight gain and swelling. Troponin and BNP elevated. Hx of CAD s/p PCI to LAD and LCx 2018, CABG X1 (LIMA to LAD,Jan 2020 for restenosis of LAD and LCx and significant dz in RCA). Hx of ICM, sCHF s/p CardioMEMs implant (2/2019), MR, PAD, CKD, DM, COPD, leg wounds. She was recently admitted following left renal artery angioplasty and stenting complicated by perinephric hematoma requiring coil embolization of bleeding vessel. Subjective:  Ms. Wade Aragon feels a little better today. Weight down 1 lbs. Good UOP. Had stress portion of stress testing this am.     Objective:   /69   Pulse 92   Temp 97.6 °F (36.4 °C) (Oral)   Resp 20   Ht 5' 4.5\" (1.638 m)   Wt 252 lb 1.5 oz (114.4 kg)   LMP 10/24/2012   SpO2 97%   BMI 42.60 kg/m²       Intake/Output Summary (Last 24 hours) at 8/21/2020 1217  Last data filed at 8/21/2020 1126  Gross per 24 hour   Intake 250 ml   Output 1850 ml   Net -1600 ml     Wt Readings from Last 3 Encounters:   08/21/20 252 lb 1.5 oz (114.4 kg)   08/18/20 250 lb (113.4 kg)   08/12/20 255 lb 1.6 oz (115.7 kg)         ASSESSMENT:   1. CHF, acute on chronic combined and right heart failure - remains fluid overloaded, on Lasix gtt at 10 mg/hr, PAD gradually decreasing but no dramatic change (PAD averages 30->27->28)  2. Cardiomyopathy, ischemic - EF 25% by limited echo, unchanged; not on ACEi/ARB/ARNI due to hypotension and CKD  3. CAD s/p PCI's and CABG - stress test in process, CABG to LAD, LCx and RCA still with disease, on high dose statin, DAPT  4. Hypotension - on midodrine, stable  5. HLD - on high dose statin  6. CKD3 - renal bit worse,   7. Hyponatremia - dropped to 129  8. Hypokalemia -improved  9. DM - stable      PLAN:  1. Consider tolvaptan and Lasix holiday???  2.  Finish stress test (rest portion) tomorrow am  3. Continue DAPT, statin  4.  No ACEi/ARB/ARNI or BB due to hypotension and CKD    Babak Campa, APRN - CNP, 8/21/2020, 12:17 PM  [facility] 79   [phone]                 Telemetry: SR without ectopy, 80-90's  NYHA: IV    Physical Exam:  General:  Awake, alert, NAD  Skin:  Warm and dry  Neck:  JVP difficult to assess  Chest:  Diminished but no overt rales, wheezes or rhonchi  Cardiovascular:  RRR, normal S1S2, + RV heave, soft systolic murmur, no g/r  Abdomen:  + tender, + anasarca (pitting edema to flanks bilat), +bowel sounds  Extremities:  2+ BLE edema      Medications:    docusate sodium  100 mg Oral Daily    ARIPiprazole  10 mg Oral Daily    aspirin EC  81 mg Oral Daily    benztropine  1 mg Oral Nightly    buprenorphine-naloxone  1 Film Sublingual BID    clopidogrel  75 mg Oral Daily    ferrous sulfate  325 mg Oral BID WC    lamoTRIgine  200 mg Oral BID    magnesium oxide  400 mg Oral Daily    midodrine  5 mg Oral BID WC    pantoprazole  40 mg Oral BID    potassium chloride  20 mEq Oral Daily    spironolactone  50 mg Oral Daily    tiotropium  2 puff Inhalation Daily    insulin lispro  0-12 Units Subcutaneous TID WC    insulin lispro  0-6 Units Subcutaneous Nightly    busPIRone  30 mg Oral BID    insulin glargine  18 Units Subcutaneous Nightly    atorvastatin  80 mg Oral Nightly    sodium chloride flush  10 mL Intravenous 2 times per day    potassium chloride  10 mEq Intravenous Once      furosemide (LASIX) 1mg/ml infusion 10 mg/hr (08/20/20 2213)    dextrose         Lab Data: Lab results independently reviewed by myself 8/20/20   CBC:   Recent Labs     08/19/20  0647 08/20/20  0706 08/21/20  0620   WBC 7.3 7.0 7.5   HGB 9.8* 9.4* 9.3*    313 317     BMP:    Recent Labs     08/19/20  0647 08/20/20  0706 08/21/20  0620   * 132* 129*   K 3.3* 3.1* 3.7   CO2 33* 32 31   BUN 53* 51* 55*   CREATININE 1.2* 1.3* 1.6*     INR:    No results

## 2020-08-21 NOTE — PROGRESS NOTES
Hospitalist Progress Note      PCP: Karime Henderson PA-C    Date of Admission: 8/18/2020    Chief Complaint: RUQ abdominal pain    Hospital Course: see H&P     Subjective:   Patient is up in bed, comfortable. Not in distress. Denies any chest pain or SOB. No new event overnight.       Medications:  Reviewed    Infusion Medications    furosemide (LASIX) 1mg/ml infusion 10 mg/hr (08/20/20 2213)    dextrose       Scheduled Medications    potassium chloride  20 mEq Oral Once    docusate sodium  100 mg Oral Daily    ARIPiprazole  10 mg Oral Daily    aspirin EC  81 mg Oral Daily    benztropine  1 mg Oral Nightly    buprenorphine-naloxone  1 Film Sublingual BID    clopidogrel  75 mg Oral Daily    ferrous sulfate  325 mg Oral BID WC    lamoTRIgine  200 mg Oral BID    magnesium oxide  400 mg Oral Daily    midodrine  5 mg Oral BID WC    pantoprazole  40 mg Oral BID    potassium chloride  20 mEq Oral Daily    spironolactone  50 mg Oral Daily    tiotropium  2 puff Inhalation Daily    insulin lispro  0-12 Units Subcutaneous TID WC    insulin lispro  0-6 Units Subcutaneous Nightly    busPIRone  30 mg Oral BID    insulin glargine  18 Units Subcutaneous Nightly    atorvastatin  80 mg Oral Nightly    sodium chloride flush  10 mL Intravenous 2 times per day    potassium chloride  10 mEq Intravenous Once     PRN Meds: morphine **OR** morphine, perflutren lipid microspheres, traMADol, traZODone, glucose, dextrose, glucagon (rDNA), dextrose, sodium chloride flush, acetaminophen **OR** acetaminophen, polyethylene glycol, albuterol      Intake/Output Summary (Last 24 hours) at 8/21/2020 1325  Last data filed at 8/21/2020 1126  Gross per 24 hour   Intake 250 ml   Output 1850 ml   Net -1600 ml       Physical Exam Performed:    /69   Pulse 92   Temp 97.6 °F (36.4 °C) (Oral)   Resp 20   Ht 5' 4.5\" (1.638 m)   Wt 252 lb 1.5 oz (114.4 kg)   LMP 10/24/2012   SpO2 97%   BMI 42.60 kg/m²     General appearance: No apparent distress, appears stated age and cooperative. HEENT: Pupils equal, round, and reactive to light. Conjunctivae/corneas clear. Neck: Supple, with full range of motion. No jugular venous distention. Trachea midline. Respiratory:  Normal respiratory effort. Clear to auscultation, bilaterally without Rales/Wheezes/Rhonchi. Cardiovascular: Regular rate and rhythm with normal S1/S2 without murmurs, rubs or gallops. Abdomen: Soft, non-tender, non-distended with normal bowel sounds. Musculoskeletal: No clubbing, cyanosis or edema bilaterally. Full range of motion without deformity. Skin: Skin color, texture, turgor normal.  No rashes or lesions. Neurologic:  Neurovascularly intact without any focal sensory/motor deficits. Cranial nerves: II-XII intact, grossly non-focal.  Psychiatric: Alert and oriented, thought content appropriate, normal insight  Capillary Refill: Brisk,< 3 seconds   Peripheral Pulses: +2 palpable, equal bilaterally       Labs:   Recent Labs     08/19/20  0647 08/20/20  0706 08/21/20  0620   WBC 7.3 7.0 7.5   HGB 9.8* 9.4* 9.3*   HCT 30.1* 29.7* 29.2*    313 317     Recent Labs     08/19/20  0647 08/20/20  0706 08/21/20  0620   * 132* 129*   K 3.3* 3.1* 3.7   CL 85* 87* 85*   CO2 33* 32 31   BUN 53* 51* 55*   CREATININE 1.2* 1.3* 1.6*   CALCIUM 9.4 9.3 9.1   PHOS  --  3.0 3.2     No results for input(s): AST, ALT, BILIDIR, BILITOT, ALKPHOS in the last 72 hours. No results for input(s): INR in the last 72 hours. Recent Labs     08/18/20  2125 08/19/20  0647   TROPONINI 0.02* 0.02*       Urinalysis:      Lab Results   Component Value Date    NITRU Negative 08/18/2020    WBCUA 0-2 08/18/2020    BACTERIA Rare 08/18/2020    RBCUA 0-2 08/18/2020    BLOODU Negative 08/18/2020    SPECGRAV 1.010 08/18/2020    GLUCOSEU Negative 08/18/2020       Radiology:  NM LUNG SCAN PERFUSION ONLY   Final Result   Low probability for pulmonary embolism.          NM Cardiac Stress Test Nuclear Imaging    (Results Pending)           Assessment/Plan:    Active Hospital Problems    Diagnosis    Acute on chronic right-sided heart failure (HCC) [I50.813]    Right upper quadrant pain [R10.11]    Acute on chronic combined systolic and diastolic congestive heart failure (HCC) [I50.43]     The patient is a pleasant, chronically ill-appearing 62 Y F with a h/o COPD, HTN, HLD, DM2, CAD s/p stents and CABG, ischemic cardiomyopathy, and CHF. She has extensive vascular disease and is s/p a LUE bypass, a LLE angioplasty (then again when it reoccluded), and a RLE bypass. She continues to smoke. She was recently diagnosed with L renal artery stenosis and underwent a L renal artery angioplasty and stent here on 8/4. She was discharged home that day but returned to the ED a few hours later with L flank pain and was admitted with a L perinephric subcapsular hematoma. She required 2u pRBCs and a coil embolization of a L renal artery branch on 8/5. Her hospitalization was complicated by SHAUNNA and hyponatremia, and with the help of nephrology each of these issues improved a little by the time she was discharged on 8/12. Now the patient presents with new sharp RUQ pain which radiates to her R posterior flank, R chest, and R shoulder. It started around 8pm last night, and she denies having any pain in this area previously. It hurts her badly to breath or move. She is very tender in the RUQ. She is s/p cholecystectomy. Her LFTs have been newly abnormal for the last couple weeks.        RUQ pain, suspect due to congestive hepatopathy, due to acute on chronic systolic CHF  - she had been on spironolactone and PRN metolazone. Then last admission she was discharged with spironolactone and daily torsemide. Started with furosemide gtt here. - no ACE/ARB/ARNI due to unstable renal status. Start BB if BP allows for this, but would first wean off her chronic midodrine if possible.    - cardiology consulted, input noted. Clinically improving with IV Lasix. Acute on chronic combined systolic and diastolic heart failure with fluid overload, LVEF of 25%-unchanged, not on ACE inhibitor or ARB due to CKD, stress test in progress, cardiology following, input appreciated.     - she has chronic nonspecific pains and is on suboxone.      CKD3  - baseline Cr is probably about 1.6, but she often sustains values closer to 2. Monitor during diuresis. Consult Nephrology.     Hyponatremia  - it looks like her baseline is in the high 120's, even when accounting for hyperglycemia. After a week of aggressive management, including multiple doses of tolvaptan, she still was only discharged at 126 last time. Monitor response to diuresis and consider nephrology consultation again if this isn't improving. Continuing IV Lasix, nephrology input noted.     CAD  - aspirin, clopidogrel. OK to continue statin.     DM2  - adjusted insulin regimen while here. Recent A1c 7.2.     14.7 cm L perinephric subcapsular hematoma was stable on repeat CT on this admission. Monitor. DVT Prophylaxis: SCD  Diet: DIET CARB CONTROL; No Added Salt (3-4 GM);  Daily Fluid Restriction: 2000 ml  Code Status: Full Code    PT/OT Eval Status: Not ordered    Dispo - in 3-5 days    Mackenzie Lee MD

## 2020-08-21 NOTE — PROGRESS NOTES
Per pt, hasn't had a bm \"in many, many days. \" C/o constipation. Administered miralax per MAR. No bm yet. Will monitor.

## 2020-08-21 NOTE — PROGRESS NOTES
[facility]       Progress Note           Subjective/   62y.o. year old female who we are seeing in consultation for SHAUNNA/CKD. HPI:  Renal function stable with diuresis, urine output of 1.8 liters over last 24 hours. States sob, edema persists. ROS:  Intake adequate, no fevers. Objective/   GEN:  Chronically ill, /65   Pulse 116 Comment: patient moving  Temp 97.5 °F (36.4 °C) (Oral)   Resp 22   Ht 5' 4.5\" (1.638 m)   Wt 252 lb 1.5 oz (114.4 kg)   LMP 10/24/2012   SpO2 95%   BMI 42.60 kg/m²   HEENT: non-icteric, no JVD  CV: S1, S2 without m/r/g; ++ LE edema  RESP: CTA B without w/r/r; breathing wnl  ABD: +bs, soft, nt, no hsm  SKIN: warm, no rashes    Data/  Recent Labs     08/19/20  0647 08/20/20  0706 08/21/20  0620   WBC 7.3 7.0 7.5   HGB 9.8* 9.4* 9.3*   HCT 30.1* 29.7* 29.2*   MCV 80.9 81.7 81.6    313 317     Recent Labs     08/18/20 2125 08/19/20  0647 08/20/20  0706 08/21/20  0620   NA  --  131* 132* 129*   K 2.8* 3.3* 3.1* 3.7   CL  --  85* 87* 85*   CO2  --  33* 32 31   GLUCOSE  --  172* 149* 155*   PHOS  --   --  3.0 3.2   MG 2.70* 2.60*  --   --    BUN  --  53* 51* 55*   CREATININE  --  1.2* 1.3* 1.6*   LABGLOM  --  46* 42* 33*   GFRAA  --  56* 51* 40*       Assessment/Plan     SHAUNNA/CKD stage 3  - Etiology: ATN (hypotension; contrast dye) from last admission  - Data: SCr peaked at 2.0, now back in low 1 range  - Plan: Continue lasix drip - increase to 20 mg/hour & aldactone for diuresis     CHF - acute on chronic  - Diuresis as above  - 2 liter/day fluid restriction  - Strict I's/O's, daily weights   - CardioMEMS monitoring per Cardiology     Recent Perinephric hematoma. - Complication of L renal artery angioplasty / stent placement  - S/P coil embolization of subsegmental L renal artery  - Stable in size based on CT this admission     Renovascular HTN.  - S/P L renal artery angioplasty / stent placement  - BP satisfactory.   - On scheduled midodrine

## 2020-08-21 NOTE — PROGRESS NOTES
Occupational Therapy  Attempted OT tx this am. Pt had recently returned from stress test and requested to be seen later today. OT will try later as schedule allows.   Shanita Morgan OT

## 2020-08-21 NOTE — PROGRESS NOTES
. Pt refused humalog d/t NPO @ 0000. Held in [facility]. Administered Lantus per orders.

## 2020-08-21 NOTE — FLOWSHEET NOTE
08/21/20 1400   Encounter Summary   Services provided to: Patient   Referral/Consult From: [id number] [facility] Family members;Friends/neighbors   [facility]   Continue Visiting   (8/21 Tel-listened to and prayed w/patient for healing)   Complexity of Encounter Moderate   Length of Encounter 30 minutes   Spiritual Assessment Completed Yes   Routine   Type Initial   Assessment Approachable   Intervention Discussed illness/injury and it's impact; Discussed relationship with God;Empowerment; Active listening;Explored feelings, thoughts, concerns;Explored coping resources;Prayer   Outcome Engaged in conversation;Expressed feelings/needs/concerns;Expressed gratitude

## 2020-08-21 NOTE — PROGRESS NOTES
Physical Therapy  Facility/Department: [facility] - MED SURG  Daily Treatment Note  NAME: Debra Sheikh  : 1965  MRN: [id number]    Date of Service: 2020    Discharge Recommendations:  Home with assist PRN, Home with Home health PT   PT Equipment Recommendations  Equipment Needed: No    Assessment   Body structures, Functions, Activity limitations: Decreased functional mobility ; Decreased strength;Decreased balance; Increased pain  Assessment: Pt pleasant and agreeable to PT tx reports R sided pain but demonstrates good participation throughout session. Pt completes bed mobility with SBA-S, t/f without AD with S  and ambulates x 115' without AD with SBA. Pt is limited in gait distances and mobility secondary to pain. Pt will benefit from continued skilled PT in acute care setting to address above deficits. Treatment Diagnosis: Difficulty walking  Prognosis: Good  Decision Making: Low Complexity  REQUIRES PT FOLLOW UP: Yes  Activity Tolerance  Activity Tolerance: Patient Tolerated treatment well;Patient limited by pain     Patient Diagnosis(es): There were no encounter diagnoses. has a past medical history of Acute kidney injury ([address] Acute on chronic congestive heart failure ([address] Alcohol dependence ([address] Bipolar 1 disorder ([address] CAD (coronary artery disease), Cardiomyopathy ([address] Cellulitis, CHF (congestive heart failure) ([address] COPD (chronic obstructive pulmonary disease) ([address] Diabetic ulcer of left foot associated with type 2 diabetes mellitus ([address] Diabetic ulcer of toe of right foot associated with type 2 diabetes mellitus ([address] GERD (gastroesophageal reflux disease), High cholesterol, HTN (hypertension), Kidney disease, chronic, stage II (mild, EGFR 60+ ml/min), MI (myocardial infarction) ([address] Positive FIT (fecal immunochemical test), Pulmonary nodule, and PVD (peripheral vascular disease) ([address] has a past surgical history that includes Tonsillectomy;  Cholecystectomy; tumor excision; Upper gastrointestinal endoscopy (4/25/2013); Upper gastrointestinal endoscopy (12/10/2015); Upper gastrointestinal endoscopy (01/06/2017); Arterial bypass surgry (Left, 01/06/2016); Cardiac catheterization (03/27/2018); Coronary angioplasty with stent (03/16/2018); Rotator cuff repair (Left, 04/22/2016); Coronary angioplasty with stent (01/03/2018); Insertable Cardiac Monitor (02/14/2019); femoral bypass (Right, 5/16/2019); transluminal angioplasty (Left, 10/08/2019); Cardiac catheterization (01/20/2020); Coronary artery bypass graft (N/A, 1/27/2020); vascular surgery (Left, 12/08/2015); transluminal angioplasty (Left, 04/29/2020); vascular surgery (Bilateral, 07/07/2020); Coronary angioplasty with stent (10/07/2019); transesophageal echocardiogram (01/26/2020); and Kidney surgery. Restrictions  Restrictions/Precautions  Restrictions/Precautions: General Precautions, Fall Risk  Position Activity Restriction  Other position/activity restrictions: High fall risk per nursing assessment, up with assistance, IV lines, [place]  Subjective   General  Chart Reviewed: Yes  Additional Pertinent Hx: s/p L renal artery angioplasty and stent at [facility] 8/04/20  Response To Previous Treatment: Patient reporting fatigue but able to participate.   Family / Caregiver Present: Yes(friend)  Subjective  Subjective: Pt supine in bed on approach and agreeable to PT tx  General Comment  Comments: RN cleared pt for session  Pain Screening  Patient Currently in Pain: Yes  Pain Assessment  Pain Assessment: 0-10  Pain Level: 10  Non-Pharmaceutical Pain Intervention(s): Ambulation/Increased Activity;Repositioned  Vital Signs  Patient Currently in Pain: Yes       Orientation  Orientation  Overall Orientation Status: Within Normal Limits  Cognition      Objective   Bed mobility  Supine to Sit: Stand by assistance(Bed Rail, extra time)  Sit to Supine: Supervision  Scooting: Supervision(bilat bed rails to head of bed)  Transfers  Sit to Stand: Supervision  Stand to sit: Supervision  Ambulation  Ambulation?: Yes  Ambulation 1  Surface: level tile  Device: No Device  Assistance: Stand by assistance  Quality of Gait: Pt amb with slow tamir with widened GABINO and increased degree of med<>lat postural sway during stance phase. Pt fairly steady despite slow pace; no LOB. Gait Deviations: Slow Tamir; Increased GABINO; Decreased step length;Decreased step height  Distance: 115 ft  Comments: Distances limited secondary to pain     Balance  Posture: Fair  Sitting - Static: Good  Sitting - Dynamic: Good  Standing - Static: Good  Standing - Dynamic: Fair;+  Exercises  Gluteal Sets: Seated BLE x 15  Hip Flexion: Seated marches BLE x 15  Hip Abduction: Seated clamshells BLE x 15  Knee Long Arc Quad: Seated BLE x 15  Ankle Pumps: Seated BLE x 15          AM-PAC Score  AM-PAC Inpatient Mobility Raw Score : 22 (08/21/20 1447)  AM-PAC Inpatient T-Scale Score : 53.28 (08/21/20 1447)  Mobility Inpatient CMS 0-100% Score: 20.91 (08/21/20 1447)  Mobility Inpatient CMS G-Code Modifier : CJ (08/21/20 1447)          Goals  Short term goals  Time Frame for Short term goals: 1 week, 8/26/20 (unless otherwise specified)  Short term goal 1: Pt will transfer supine <-> sit with modified(I) -- 8/21: Supine to sit SBA-S  Short term goal 2: Pt will transfer sit <-> stand and bed>chair with modified(I) -- 8/21: S without AD  Short term goal 3: Pt will ambulate x 200 feet without AD with supervision/modified(I) -- 8/21: x 100' no AD SBA  Short term goal 4: By 8/21/20: Pt will tolerate 12-15 reps BLE exercise for strengthening, balance, and endurance -- 8/20: GOAL MET: x 15 reps seated BLE exercises  Short term goal 5: Pt will ambulate up/down 4 steps using B handrails with SBA -- 8/20: DNT  Patient Goals   Patient goals :  \"To walk better and go home\"    Plan    Plan  Times per week: 3-5x/week in acute care  Times per day: Daily  Specific instructions for Next Treatment: Progress ther ex and mobility as tolerated, assess stair amb prior to D/C home  Current Treatment Recommendations: Strengthening, Balance Training, Functional Mobility Training, Transfer Training, Gait Training, Home Exercise Program, Safety Education & Training, Pain Management, Stair training  Safety Devices  Type of devices:  All fall risk precautions in place, Left in chair, Call light within reach, Chair alarm in place, Nurse notified, Gait belt, Patient at risk for falls     Therapy Time   Individual Concurrent Group Co-treatment   Time In 1407         Time Out 1435         Minutes 28         Timed Code Treatment Minutes: [address]

## 2020-08-22 LAB
ALBUMIN SERPL-MCNC: 3.4 G/DL (ref 3.4–5)
ALP BLD-CCNC: 284 U/L (ref 40–129)
ALT SERPL-CCNC: 10 U/L (ref 10–40)
ANION GAP SERPL CALCULATED.3IONS-SCNC: 12 MMOL/L (ref 3–16)
AST SERPL-CCNC: 15 U/L (ref 15–37)
BILIRUB SERPL-MCNC: 2.6 MG/DL (ref 0–1)
BILIRUBIN DIRECT: 1.5 MG/DL (ref 0–0.3)
BILIRUBIN, INDIRECT: 1.1 MG/DL (ref 0–1)
BUN BLDV-MCNC: 51 MG/DL (ref 7–20)
CALCIUM SERPL-MCNC: 9 MG/DL (ref 8.3–10.6)
CHLORIDE BLD-SCNC: 82 MMOL/L (ref 99–110)
CO2: 31 MMOL/L (ref 21–32)
CREAT SERPL-MCNC: 1.5 MG/DL (ref 0.6–1.1)
GFR AFRICAN AMERICAN: 43
GFR NON-AFRICAN AMERICAN: 36
GLUCOSE BLD-MCNC: 144 MG/DL (ref 70–99)
GLUCOSE BLD-MCNC: 160 MG/DL (ref 70–99)
GLUCOSE BLD-MCNC: 176 MG/DL (ref 70–99)
GLUCOSE BLD-MCNC: 185 MG/DL (ref 70–99)
GLUCOSE BLD-MCNC: 218 MG/DL (ref 70–99)
GLUCOSE BLD-MCNC: 258 MG/DL (ref 70–99)
HCT VFR BLD CALC: 29.9 % (ref 36–48)
HEMOGLOBIN: 9.5 G/DL (ref 12–16)
MCH RBC QN AUTO: 25.9 PG (ref 26–34)
MCHC RBC AUTO-ENTMCNC: 31.7 G/DL (ref 31–36)
MCV RBC AUTO: 81.8 FL (ref 80–100)
PDW BLD-RTO: 23.9 % (ref 12.4–15.4)
PERFORMED ON: ABNORMAL
PHOSPHORUS: 2.9 MG/DL (ref 2.5–4.9)
PLATELET # BLD: 320 K/UL (ref 135–450)
PMV BLD AUTO: 8.7 FL (ref 5–10.5)
POTASSIUM SERPL-SCNC: 3.3 MMOL/L (ref 3.5–5.1)
RBC # BLD: 3.66 M/UL (ref 4–5.2)
SODIUM BLD-SCNC: 125 MMOL/L (ref 136–145)
TOTAL PROTEIN: 7.3 G/DL (ref 6.4–8.2)
WBC # BLD: 8.5 K/UL (ref 4–11)

## 2020-08-22 PROCEDURE — 2580000003 HC RX 258: Performed by: INTERNAL MEDICINE

## 2020-08-22 PROCEDURE — 80076 HEPATIC FUNCTION PANEL: CPT

## 2020-08-22 PROCEDURE — 36415 COLL VENOUS BLD VENIPUNCTURE: CPT

## 2020-08-22 PROCEDURE — 85027 COMPLETE CBC AUTOMATED: CPT

## 2020-08-22 PROCEDURE — 80069 RENAL FUNCTION PANEL: CPT

## 2020-08-22 PROCEDURE — P9047 ALBUMIN (HUMAN), 25%, 50ML: HCPCS | Performed by: INTERNAL MEDICINE

## 2020-08-22 PROCEDURE — 6370000000 HC RX 637 (ALT 250 FOR IP): Performed by: INTERNAL MEDICINE

## 2020-08-22 PROCEDURE — 6370000000 HC RX 637 (ALT 250 FOR IP): Performed by: NURSE PRACTITIONER

## 2020-08-22 PROCEDURE — A9502 TC99M TETROFOSMIN: HCPCS | Performed by: INTERNAL MEDICINE

## 2020-08-22 PROCEDURE — 3430000000 HC RX DIAGNOSTIC RADIOPHARMACEUTICAL: Performed by: INTERNAL MEDICINE

## 2020-08-22 PROCEDURE — 1200000000 HC SEMI PRIVATE

## 2020-08-22 PROCEDURE — 6360000002 HC RX W HCPCS: Performed by: NURSE PRACTITIONER

## 2020-08-22 PROCEDURE — 6360000002 HC RX W HCPCS: Performed by: INTERNAL MEDICINE

## 2020-08-22 PROCEDURE — 99232 SBSQ HOSP IP/OBS MODERATE 35: CPT | Performed by: NURSE PRACTITIONER

## 2020-08-22 RX ORDER — ALBUMIN (HUMAN) 12.5 G/50ML
25 SOLUTION INTRAVENOUS EVERY 12 HOURS
Status: COMPLETED | OUTPATIENT
Start: 2020-08-22 | End: 2020-08-24

## 2020-08-22 RX ORDER — POTASSIUM CHLORIDE 20 MEQ/1
40 TABLET, EXTENDED RELEASE ORAL ONCE
Status: COMPLETED | OUTPATIENT
Start: 2020-08-22 | End: 2020-08-22

## 2020-08-22 RX ORDER — MORPHINE SULFATE 2 MG/ML
2 INJECTION, SOLUTION INTRAMUSCULAR; INTRAVENOUS
Status: COMPLETED | OUTPATIENT
Start: 2020-08-22 | End: 2020-08-22

## 2020-08-22 RX ORDER — MORPHINE SULFATE 4 MG/ML
4 INJECTION, SOLUTION INTRAMUSCULAR; INTRAVENOUS
Status: COMPLETED | OUTPATIENT
Start: 2020-08-22 | End: 2020-08-22

## 2020-08-22 RX ADMIN — Medication 10 ML: at 06:41

## 2020-08-22 RX ADMIN — CLOPIDOGREL BISULFATE 75 MG: 75 TABLET ORAL at 10:15

## 2020-08-22 RX ADMIN — LAMOTRIGINE 200 MG: 100 TABLET ORAL at 20:46

## 2020-08-22 RX ADMIN — MAGNESIUM HYDROXIDE 30 ML: 2400 SUSPENSION ORAL at 20:59

## 2020-08-22 RX ADMIN — POTASSIUM CHLORIDE 20 MEQ: 20 TABLET, EXTENDED RELEASE ORAL at 10:16

## 2020-08-22 RX ADMIN — Medication 10 ML: at 10:17

## 2020-08-22 RX ADMIN — FUROSEMIDE 20 MG/HR: 10 INJECTION, SOLUTION INTRAMUSCULAR; INTRAVENOUS at 16:01

## 2020-08-22 RX ADMIN — TETROFOSMIN 34.8 MILLICURIE: 1.38 INJECTION, POWDER, LYOPHILIZED, FOR SOLUTION INTRAVENOUS at 08:27

## 2020-08-22 RX ADMIN — FUROSEMIDE 20 MG/HR: 10 INJECTION, SOLUTION INTRAMUSCULAR; INTRAVENOUS at 10:21

## 2020-08-22 RX ADMIN — POTASSIUM CHLORIDE 40 MEQ: 20 TABLET, EXTENDED RELEASE ORAL at 12:35

## 2020-08-22 RX ADMIN — POLYETHYLENE GLYCOL 3350 17 G: 17 POWDER, FOR SOLUTION ORAL at 20:59

## 2020-08-22 RX ADMIN — BUSPIRONE HYDROCHLORIDE 30 MG: 5 TABLET ORAL at 20:46

## 2020-08-22 RX ADMIN — PANTOPRAZOLE SODIUM 40 MG: 40 TABLET, DELAYED RELEASE ORAL at 10:15

## 2020-08-22 RX ADMIN — FERROUS SULFATE TAB 325 MG (65 MG ELEMENTAL FE) 325 MG: 325 (65 FE) TAB at 16:03

## 2020-08-22 RX ADMIN — BENZTROPINE MESYLATE 1 MG: 1 TABLET ORAL at 20:46

## 2020-08-22 RX ADMIN — MORPHINE SULFATE 2 MG: 2 INJECTION, SOLUTION INTRAMUSCULAR; INTRAVENOUS at 10:17

## 2020-08-22 RX ADMIN — MORPHINE SULFATE 4 MG: 4 INJECTION, SOLUTION INTRAMUSCULAR; INTRAVENOUS at 15:19

## 2020-08-22 RX ADMIN — Medication 10 ML: at 21:02

## 2020-08-22 RX ADMIN — FUROSEMIDE 20 MG/HR: 10 INJECTION, SOLUTION INTRAMUSCULAR; INTRAVENOUS at 04:00

## 2020-08-22 RX ADMIN — ARIPIPRAZOLE 10 MG: 10 TABLET ORAL at 10:15

## 2020-08-22 RX ADMIN — ASPIRIN 81 MG: 81 TABLET, COATED ORAL at 10:16

## 2020-08-22 RX ADMIN — INSULIN LISPRO 4 UNITS: 100 INJECTION, SOLUTION INTRAVENOUS; SUBCUTANEOUS at 10:28

## 2020-08-22 RX ADMIN — ALBUMIN (HUMAN) 25 G: 0.25 INJECTION, SOLUTION INTRAVENOUS at 12:54

## 2020-08-22 RX ADMIN — BUSPIRONE HYDROCHLORIDE 30 MG: 5 TABLET ORAL at 10:16

## 2020-08-22 RX ADMIN — SPIRONOLACTONE 50 MG: 25 TABLET ORAL at 10:15

## 2020-08-22 RX ADMIN — FUROSEMIDE 20 MG/HR: 10 INJECTION, SOLUTION INTRAMUSCULAR; INTRAVENOUS at 20:49

## 2020-08-22 RX ADMIN — PANTOPRAZOLE SODIUM 40 MG: 40 TABLET, DELAYED RELEASE ORAL at 15:19

## 2020-08-22 RX ADMIN — MIDODRINE HYDROCHLORIDE 5 MG: 5 TABLET ORAL at 16:03

## 2020-08-22 RX ADMIN — MORPHINE SULFATE 4 MG: 4 INJECTION, SOLUTION INTRAMUSCULAR; INTRAVENOUS at 20:46

## 2020-08-22 RX ADMIN — INSULIN GLARGINE 18 UNITS: 100 INJECTION, SOLUTION SUBCUTANEOUS at 20:47

## 2020-08-22 RX ADMIN — FERROUS SULFATE TAB 325 MG (65 MG ELEMENTAL FE) 325 MG: 325 (65 FE) TAB at 10:15

## 2020-08-22 RX ADMIN — MORPHINE SULFATE 4 MG: 4 INJECTION, SOLUTION INTRAMUSCULAR; INTRAVENOUS at 18:05

## 2020-08-22 RX ADMIN — MORPHINE SULFATE 2 MG: 2 INJECTION, SOLUTION INTRAMUSCULAR; INTRAVENOUS at 06:41

## 2020-08-22 RX ADMIN — MAGNESIUM OXIDE TAB 400 MG (241.3 MG ELEMENTAL MG) 400 MG: 400 (241.3 MG) TAB at 10:15

## 2020-08-22 RX ADMIN — INSULIN LISPRO 2 UNITS: 100 INJECTION, SOLUTION INTRAVENOUS; SUBCUTANEOUS at 16:05

## 2020-08-22 RX ADMIN — TRAZODONE HYDROCHLORIDE 100 MG: 50 TABLET ORAL at 20:46

## 2020-08-22 RX ADMIN — MORPHINE SULFATE 2 MG: 2 INJECTION, SOLUTION INTRAMUSCULAR; INTRAVENOUS at 04:05

## 2020-08-22 RX ADMIN — MIDODRINE HYDROCHLORIDE 5 MG: 5 TABLET ORAL at 10:16

## 2020-08-22 RX ADMIN — LAMOTRIGINE 200 MG: 100 TABLET ORAL at 10:15

## 2020-08-22 RX ADMIN — DOCUSATE SODIUM 100 MG: 100 CAPSULE, LIQUID FILLED ORAL at 10:16

## 2020-08-22 ASSESSMENT — PAIN - FUNCTIONAL ASSESSMENT
PAIN_FUNCTIONAL_ASSESSMENT: 0-10
PAIN_FUNCTIONAL_ASSESSMENT: PREVENTS OR INTERFERES SOME ACTIVE ACTIVITIES AND ADLS
PAIN_FUNCTIONAL_ASSESSMENT: 0-10
PAIN_FUNCTIONAL_ASSESSMENT: 0-10
PAIN_FUNCTIONAL_ASSESSMENT: PREVENTS OR INTERFERES SOME ACTIVE ACTIVITIES AND ADLS
PAIN_FUNCTIONAL_ASSESSMENT: PREVENTS OR INTERFERES SOME ACTIVE ACTIVITIES AND ADLS

## 2020-08-22 ASSESSMENT — PAIN SCALES - GENERAL
PAINLEVEL_OUTOF10: 8
PAINLEVEL_OUTOF10: 4
PAINLEVEL_OUTOF10: 8
PAINLEVEL_OUTOF10: 9
PAINLEVEL_OUTOF10: 8
PAINLEVEL_OUTOF10: 4
PAINLEVEL_OUTOF10: 9
PAINLEVEL_OUTOF10: 9
PAINLEVEL_OUTOF10: 5
PAINLEVEL_OUTOF10: 9

## 2020-08-22 ASSESSMENT — PAIN DESCRIPTION - PAIN TYPE
TYPE: ACUTE PAIN
TYPE: CHRONIC PAIN

## 2020-08-22 ASSESSMENT — PAIN DESCRIPTION - LOCATION: LOCATION: ABDOMEN

## 2020-08-22 ASSESSMENT — PAIN DESCRIPTION - DESCRIPTORS
DESCRIPTORS: RADIATING
DESCRIPTORS: RADIATING

## 2020-08-22 ASSESSMENT — PAIN DESCRIPTION - PROGRESSION
CLINICAL_PROGRESSION: NOT CHANGED
CLINICAL_PROGRESSION: NOT CHANGED

## 2020-08-22 ASSESSMENT — PAIN DESCRIPTION - FREQUENCY: FREQUENCY: CONTINUOUS

## 2020-08-22 ASSESSMENT — PAIN DESCRIPTION - ONSET: ONSET: ON-GOING

## 2020-08-22 NOTE — PROGRESS NOTES
[facility]       Progress Note           Subjective/   62y.o. year old female who we are seeing in consultation for SHAUNNA/CKD. HPI:  Renal function stable with diuresis, urine output of 2.7 liters over last 24 hours. States sob, edema persists. ROS:  Intake adequate, no lightheadedness. Objective/   GEN:  Chronically ill, /73   Pulse 89   Temp 97.9 °F (36.6 °C) (Oral)   Resp 16   Ht 5' 4.5\" (1.638 m)   Wt 254 lb 11.2 oz (115.5 kg)   LMP 10/24/2012   SpO2 96%   BMI 43.04 kg/m²   HEENT: non-icteric, no JVD  CV: S1, S2 without m/r/g; ++ LE edema  RESP: CTA B without w/r/r; breathing wnl  ABD: +bs, soft, nt, no hsm  SKIN: warm, no rashes    Data/  Recent Labs     08/20/20  0706 08/21/20  0620 08/22/20  0620   WBC 7.0 7.5 8.5   HGB 9.4* 9.3* 9.5*   HCT 29.7* 29.2* 29.9*   MCV 81.7 81.6 81.8    317 320     Recent Labs     08/20/20  0706 08/21/20  0620 08/22/20  0620   * 129* 125*   K 3.1* 3.7 3.3*   CL 87* 85* 82*   CO2 32 31 31   GLUCOSE 149* 155* 144*   PHOS 3.0 3.2 2.9   BUN 51* 55* 51*   CREATININE 1.3* 1.6* 1.5*   LABGLOM 42* 33* 36*   GFRAA 51* 40* 43*       Assessment/Plan     SHAUNNA/CKD stage 3  - Etiology: ATN (hypotension; contrast dye) from last admission  - Data: SCr peaked at 2.0, now back in low 1 range  - Plan: Continue lasix drip at 20 mg/hour & aldactone for diuresis, add course of albumin over weekend     CHF - acute on chronic  - Diuresis as above  - 2 liter/day fluid restriction  - Strict I's/O's, daily weights   - CardioMEMS monitoring per Cardiology     Recent Perinephric hematoma. - Complication of L renal artery angioplasty / stent placement  - S/P coil embolization of subsegmental L renal artery  - Stable in size based on CT this admission     Renovascular HTN.  - S/P L renal artery angioplasty / stent placement  - BP satisfactory.   - On scheduled midodrine 5 mg po bid  - Follow for late recurrence of HTN (post-perinephric bleed) (I.e, Page kidney).     Anemia - blood loss. - Hgb stabilized in 8-9 range now  - S/P transfusion 2 units PRBC's last admission, oral Fe added.     Hyponatremia  - Has been present consistently since 4/20   - ?  Etiology - likely multifactorial with pain, volume excess / CHF       - CHF and ? lamictal may be cause of chronic hyponatremia  - Rec'd Tolvaptan last admission with sodium as low as 120, off metolazone     Hypokalemia.  - Scheduled K replacement 20 meq a day with prn replacement today

## 2020-08-22 NOTE — PROGRESS NOTES
Hospitalist Progress Note      PCP: Jose Sanchez PA-C    Date of Admission: 8/18/2020    Chief Complaint: RUQ abdominal pain    Hospital Course: see H&P     Subjective:     Persistent Abd/flank pain. LFTs elevated. Stress testing completed, results reviewed with pt.    GI consult placed      Medications:  Reviewed    Infusion Medications    furosemide (LASIX) 1mg/ml infusion 20 mg/hr (08/22/20 1021)    dextrose       Scheduled Medications    potassium chloride  40 mEq Oral Once    albumin human  25 g Intravenous Q12H    docusate sodium  100 mg Oral Daily    ARIPiprazole  10 mg Oral Daily    aspirin EC  81 mg Oral Daily    benztropine  1 mg Oral Nightly    buprenorphine-naloxone  1 Film Sublingual BID    clopidogrel  75 mg Oral Daily    ferrous sulfate  325 mg Oral BID WC    lamoTRIgine  200 mg Oral BID    magnesium oxide  400 mg Oral Daily    midodrine  5 mg Oral BID WC    pantoprazole  40 mg Oral BID    potassium chloride  20 mEq Oral Daily    spironolactone  50 mg Oral Daily    tiotropium  2 puff Inhalation Daily    insulin lispro  0-12 Units Subcutaneous TID WC    insulin lispro  0-6 Units Subcutaneous Nightly    busPIRone  30 mg Oral BID    insulin glargine  18 Units Subcutaneous Nightly    atorvastatin  80 mg Oral Nightly    sodium chloride flush  10 mL Intravenous 2 times per day     PRN Meds: morphine **OR** morphine, perflutren lipid microspheres, traMADol, traZODone, glucose, dextrose, glucagon (rDNA), dextrose, sodium chloride flush, acetaminophen **OR** acetaminophen, polyethylene glycol, albuterol      Intake/Output Summary (Last 24 hours) at 8/22/2020 1230  Last data filed at 8/22/2020 1101  Gross per 24 hour   Intake 1560 ml   Output 2725 ml   Net -1165 ml       Physical Exam Performed:    /73   Pulse 89   Temp 97.9 °F (36.6 °C) (Oral)   Resp 16   Ht 5' 4.5\" (1.638 m)   Wt 254 lb 11.2 oz (115.5 kg)   LMP 10/24/2012   SpO2 96%   BMI 43.04 kg/m²     General appearance:  No apparent distress, appears stated age and cooperative. HEENT:  Normal cephalic, atraumatic without obvious deformity. Pupils equal, round, and reactive to light. Extra ocular muscles intact. Conjunctivae/corneas clear. Neck: Supple, with full range of motion. No jugular venous distention. Trachea midline. Respiratory:  Normal respiratory effort. Bibasilar rales. No wheezing. Cardiovascular:  Regular rate and rhythm with normal S1/S2 without murmurs, rubs or gallops. Abdomen: Soft, RUQ tender,+guarding non-distended with normal bowel sounds. Musculoskeletal:  No clubbing, cyanosis. 2+ BLE pitting edema. Also edema of abdominal wall. Full range of motion without deformity. Skin: Skin color, texture, turgor normal.  No rashes or lesions. Neurologic:  Neurovascularly intact without any focal sensory/motor deficits. Cranial nerves: II-XII intact, grossly non-focal.  Psychiatric:  Alert and oriented, thought content appropriate, normal insight  Capillary Refill: Brisk,< 3 seconds   Peripheral Pulses: +2 palpable, equal bilaterally     Labs:   Recent Labs     08/20/20  0706 08/21/20  0620 08/22/20 0620   WBC 7.0 7.5 8.5   HGB 9.4* 9.3* 9.5*   HCT 29.7* 29.2* 29.9*    317 320     Recent Labs     08/20/20  0706 08/21/20  0620 08/22/20 0620   * 129* 125*   K 3.1* 3.7 3.3*   CL 87* 85* 82*   CO2 32 31 31   BUN 51* 55* 51*   CREATININE 1.3* 1.6* 1.5*   CALCIUM 9.3 9.1 9.0   PHOS 3.0 3.2 2.9     No results for input(s): AST, ALT, BILIDIR, BILITOT, ALKPHOS in the last 72 hours. No results for input(s): INR in the last 72 hours. No results for input(s): Leellen Carota in the last 72 hours.     Urinalysis:      Lab Results   Component Value Date    NITRU Negative 08/18/2020    WBCUA 0-2 08/18/2020    BACTERIA Rare 08/18/2020    RBCUA 0-2 08/18/2020    BLOODU Negative 08/18/2020    SPECGRAV 1.010 08/18/2020    GLUCOSEU Negative 08/18/2020       Radiology:  NM Cardiac Stress Test Nuclear Imaging   Final Result      NM LUNG SCAN PERFUSION ONLY   Final Result   Low probability for pulmonary embolism. Assessment/Plan:    Active Hospital Problems    Diagnosis    Acute on chronic right-sided heart failure (HCC) [I50.813]    Right upper quadrant pain [R10.11]    Acute on chronic combined systolic and diastolic congestive heart failure (HCC) [I50.43]     The patient is a pleasant, chronically ill-appearing 62 Y F with a h/o COPD, HTN, HLD, DM2, CAD s/p stents and CABG, ischemic cardiomyopathy, and CHF. She has extensive vascular disease and is s/p a LUE bypass, a LLE angioplasty (then again when it reoccluded), and a RLE bypass. She continues to smoke. She was recently diagnosed with L renal artery stenosis and underwent a L renal artery angioplasty and stent here on 8/4. She was discharged home that day but returned to the ED a few hours later with L flank pain and was admitted with a L perinephric subcapsular hematoma. She required 2u pRBCs and a coil embolization of a L renal artery branch on 8/5. Her hospitalization was complicated by SHAUNNA and hyponatremia, and with the help of nephrology each of these issues improved a little by the time she was discharged on 8/12. Now the patient presents with new sharp RUQ pain which radiates to her R posterior flank, R chest, and R shoulder. It started around 8pm last night, and she denies having any pain in this area previously. It hurts her badly to breath or move. She is very tender in the RUQ. She is s/p cholecystectomy. Her LFTs have been newly abnormal for the last couple weeks.        RUQ pain, suspect due to congestive hepatopathy, due to acute on chronic systolic CHF  - she had been on spironolactone and PRN metolazone. Then last admission she was discharged with spironolactone and daily torsemide. Started with furosemide gtt here. She has had no improvement, in fact, pain has worsened.  Her bili/Alk phos are elevated. Given persistence, will consult GI for recs. Holds Statin. - no ACE/ARB/ARNI due to unstable renal status. Start BB if BP allows for this, but would first wean off her chronic midodrine if possible. - cardiology consulted, input noted. -Continue Lasix gtt. Acute on chronic combined systolic and diastolic heart failure with fluid overload, LVEF of 25%-unchanged, not on ACE inhibitor or ARB due to CKD, stress test in progress, cardiology following, input appreciated.     - she has chronic nonspecific pains and is on suboxone.      CKD3  - baseline Cr is probably about 1.5   Monitor during diuresis. -nephrology following, appreciate assistance.      Hyponatremia  - it looks like her baseline is in the high 120's, even when accounting for hyperglycemia. After a week of aggressive management, including multiple doses of tolvaptan, she still was only discharged at 126 last time. Monitor response to diuresis and consider nephrology consultation again if this isn't improving. Continuing IV Lasix, nephrology input noted.     CAD  - aspirin, clopidogrel. OK to continue statin.     DM2  - adjusted insulin regimen while here. Recent A1c 7.2.     14.7 cm L perinephric subcapsular hematoma was stable on repeat CT on this admission. Monitor. DVT Prophylaxis: SCD  Diet: DIET CARB CONTROL; No Added Salt (3-4 GM); Daily Fluid Restriction: 2000 ml  Code Status: Full Code    PT/OT Eval Status: Not ordered    Dispo - unclear, Monday-minimally.      Anita Collier, APRN - CNP

## 2020-08-22 NOTE — PROGRESS NOTES
[facility] 79   Daily Progress Note    Admit Date:  8/18/2020  HPI:   Presented with right sided abdominal pain radiating to right chest, associated with shortness of breath, weight gain and swelling. Troponin and BNP elevated. Hx of CAD s/p PCI to LAD and LCx 2018, CABG X1 (LIMA to LAD,Jan 2020 for restenosis of LAD and LCx and significant dz in RCA). Hx of ICM, sCHF s/p CardioMEMs implant (2/2019), MR, PAD, CKD, DM, COPD, leg wounds. She was recently admitted following left renal artery angioplasty and stenting complicated by perinephric hematoma requiring coil embolization of bleeding vessel. Subjective:  Ms. Nimisha Morris completed remainder of stress test this am.  Abdomen somewhat softer but still firm and tender. No BM for ~ 1 week or longer. Objective:   /73   Pulse 89   Temp 97.9 °F (36.6 °C) (Oral)   Resp 16   Ht 5' 4.5\" (1.638 m)   Wt 254 lb 11.2 oz (115.5 kg)   LMP 10/24/2012   SpO2 96%   BMI 43.04 kg/m²       Intake/Output Summary (Last 24 hours) at 8/22/2020 1100  Last data filed at 8/22/2020 0951  Gross per 24 hour   Intake 1560 ml   Output 3175 ml   Net -1615 ml     Wt Readings from Last 3 Encounters:   08/22/20 254 lb 11.2 oz (115.5 kg)   08/18/20 250 lb (113.4 kg)   08/12/20 255 lb 1.6 oz (115.7 kg)         ASSESSMENT:   1. CHF, acute on chronic combined and right heart failure - remains fluid overloaded, on Lasix gtt at 10 mg/hr, PAD gradually decreasing but no dramatic change (PAD averages 30->27->28->29)  2. Cardiomyopathy, ischemic - EF 25% by limited echo, unchanged; not on ACEi/ARB/ARNI due to hypotension and CKD  3. CAD s/p PCI's and CABG - stress test in process, CABG to LAD, LCx and RCA still with disease, on high dose statin, DAPT  4. Hypotension - on midodrine, stable  5. HLD - on high dose statin  6. CKD3 - overall stable  7. Hyponatremia - continues to drop  8. Hypokalemia -improved  9. DM - stable      PLAN:  1.  Diuretics per nephrology - good UOP, net neg 6 liters but weight up  2. Glycolax + stool softener   3. Stress test reading pending  4. Continue DAPT, statin  5.  No ACEi/ARB/ARNI or BB due to hypotension and CKD    Nicole Javed, APRN - CNP, 8/22/2020, 11:00 AM  [facility]   [phone]                 Telemetry: SR 80-90's, isolated PVC's  NYHA: IV    Physical Exam:  General:  Awake, alert, NAD  Skin:  Warm and dry  Neck:  JVP ++  Chest:  Diminished but no overt rales, wheezes or rhonchi  Cardiovascular:  RRR, normal S1S2, + RV heave, soft systolic murmur, no g/r  Abdomen:  + tender, + anasarca (pitting edema to flanks bilat), +bowel sounds  Extremities:  2+ BLE edema      Medications:    docusate sodium  100 mg Oral Daily    ARIPiprazole  10 mg Oral Daily    aspirin EC  81 mg Oral Daily    benztropine  1 mg Oral Nightly    buprenorphine-naloxone  1 Film Sublingual BID    clopidogrel  75 mg Oral Daily    ferrous sulfate  325 mg Oral BID WC    lamoTRIgine  200 mg Oral BID    magnesium oxide  400 mg Oral Daily    midodrine  5 mg Oral BID WC    pantoprazole  40 mg Oral BID    potassium chloride  20 mEq Oral Daily    spironolactone  50 mg Oral Daily    tiotropium  2 puff Inhalation Daily    insulin lispro  0-12 Units Subcutaneous TID WC    insulin lispro  0-6 Units Subcutaneous Nightly    busPIRone  30 mg Oral BID    insulin glargine  18 Units Subcutaneous Nightly    atorvastatin  80 mg Oral Nightly    sodium chloride flush  10 mL Intravenous 2 times per day    potassium chloride  10 mEq Intravenous Once      furosemide (LASIX) 1mg/ml infusion 20 mg/hr (08/22/20 1021)    dextrose         Lab Data: Lab results independently reviewed by myself 8/20/20   CBC:   Recent Labs     08/20/20  0706 08/21/20  0620 08/22/20  0620   WBC 7.0 7.5 8.5   HGB 9.4* 9.3* 9.5*    317 320     BMP:    Recent Labs     08/20/20  0706 08/21/20  0620 08/22/20  0620   * 129* 125*   K 3.1* 3.7 3.3*   CO2 32 31 31   BUN 51* 55* 51* evidence of thrombus formation and low flow velocities. Moderate amount of plaque in the aorta      CARDIAC CATH 1/20/2020:   Left Heart Cath  Dominance : Right   LM: 70-80% short distal stenosis   LAD: 70-80% short ostial stenosis, extending into takeoff of high first diagonal; large proximal to mid stented segment patent with jailed second diagonal  (80% ostial D2) and 70% in stent restenosis of most distal stent; distal to near apical LAD with minimal disease   LCx: 70-80% short ostial stenosis, prior to patent proximal to mid stents    RCA: large, dominant vessel with long 60% proximal to mid stenosis    LVEDP: 4 mmHG  LVG not done due to CKD.     Impression/Recommendations:  Diabetic with previous LAD and LCx stenting in 2018 returns with unstable angina, in stent restenosis and Left Main bifurcation disease. Recommend CTS evaluation inpatient to discuss surgical revascularization option. Hold Clopidogrel. ECHO 4/3/19:   Alvin Sanz systolic function is mildly reduced with EF estimated at 40-45%.   There is severe HK of the apex and apical-septal segment.   Normal left ventricular diastolic filling pressure.   Mild mitral regurgitation.   Systolic pulmonary artery pressure (SPAP) estimated at 32mmHg (RA pressure 3mmHg). [facility] 3/26/18 [employer] promus premier [id number] CCx and [id number] CCx.

## 2020-08-23 LAB
ALBUMIN SERPL-MCNC: 3.8 G/DL (ref 3.4–5)
ALP BLD-CCNC: 276 U/L (ref 40–129)
ALT SERPL-CCNC: 10 U/L (ref 10–40)
ANION GAP SERPL CALCULATED.3IONS-SCNC: 15 MMOL/L (ref 3–16)
AST SERPL-CCNC: 15 U/L (ref 15–37)
BASOPHILS ABSOLUTE: 0 K/UL (ref 0–0.2)
BASOPHILS RELATIVE PERCENT: 0.7 %
BILIRUB SERPL-MCNC: 3.1 MG/DL (ref 0–1)
BILIRUBIN DIRECT: 1.7 MG/DL (ref 0–0.3)
BILIRUBIN, INDIRECT: 1.4 MG/DL (ref 0–1)
BUN BLDV-MCNC: 52 MG/DL (ref 7–20)
CALCIUM SERPL-MCNC: 9.4 MG/DL (ref 8.3–10.6)
CHLORIDE BLD-SCNC: 86 MMOL/L (ref 99–110)
CO2: 30 MMOL/L (ref 21–32)
CREAT SERPL-MCNC: 1.5 MG/DL (ref 0.6–1.1)
EOSINOPHILS ABSOLUTE: 0.1 K/UL (ref 0–0.6)
EOSINOPHILS RELATIVE PERCENT: 1.4 %
GFR AFRICAN AMERICAN: 43
GFR NON-AFRICAN AMERICAN: 36
GLUCOSE BLD-MCNC: 143 MG/DL (ref 70–99)
GLUCOSE BLD-MCNC: 155 MG/DL (ref 70–99)
GLUCOSE BLD-MCNC: 159 MG/DL (ref 70–99)
GLUCOSE BLD-MCNC: 165 MG/DL (ref 70–99)
GLUCOSE BLD-MCNC: 165 MG/DL (ref 70–99)
HCT VFR BLD CALC: 29.1 % (ref 36–48)
HEMOGLOBIN: 9.3 G/DL (ref 12–16)
LYMPHOCYTES ABSOLUTE: 0.7 K/UL (ref 1–5.1)
LYMPHOCYTES RELATIVE PERCENT: 9.6 %
MAGNESIUM: 2.3 MG/DL (ref 1.8–2.4)
MCH RBC QN AUTO: 26 PG (ref 26–34)
MCHC RBC AUTO-ENTMCNC: 31.8 G/DL (ref 31–36)
MCV RBC AUTO: 81.7 FL (ref 80–100)
MONOCYTES ABSOLUTE: 0.4 K/UL (ref 0–1.3)
MONOCYTES RELATIVE PERCENT: 5.6 %
NEUTROPHILS ABSOLUTE: 6.1 K/UL (ref 1.7–7.7)
NEUTROPHILS RELATIVE PERCENT: 82.7 %
PDW BLD-RTO: 24.5 % (ref 12.4–15.4)
PERFORMED ON: ABNORMAL
PHOSPHORUS: 2.6 MG/DL (ref 2.5–4.9)
PLATELET # BLD: 313 K/UL (ref 135–450)
PMV BLD AUTO: 8.8 FL (ref 5–10.5)
POTASSIUM SERPL-SCNC: 3.6 MMOL/L (ref 3.5–5.1)
RBC # BLD: 3.57 M/UL (ref 4–5.2)
SODIUM BLD-SCNC: 131 MMOL/L (ref 136–145)
TOTAL PROTEIN: 7.5 G/DL (ref 6.4–8.2)
WBC # BLD: 7.4 K/UL (ref 4–11)

## 2020-08-23 PROCEDURE — 6370000000 HC RX 637 (ALT 250 FOR IP): Performed by: INTERNAL MEDICINE

## 2020-08-23 PROCEDURE — 6360000002 HC RX W HCPCS: Performed by: INTERNAL MEDICINE

## 2020-08-23 PROCEDURE — P9047 ALBUMIN (HUMAN), 25%, 50ML: HCPCS | Performed by: INTERNAL MEDICINE

## 2020-08-23 PROCEDURE — 80069 RENAL FUNCTION PANEL: CPT

## 2020-08-23 PROCEDURE — 83735 ASSAY OF MAGNESIUM: CPT

## 2020-08-23 PROCEDURE — 36415 COLL VENOUS BLD VENIPUNCTURE: CPT

## 2020-08-23 PROCEDURE — 6360000002 HC RX W HCPCS: Performed by: NURSE PRACTITIONER

## 2020-08-23 PROCEDURE — 99233 SBSQ HOSP IP/OBS HIGH 50: CPT | Performed by: NURSE PRACTITIONER

## 2020-08-23 PROCEDURE — 80076 HEPATIC FUNCTION PANEL: CPT

## 2020-08-23 PROCEDURE — 94761 N-INVAS EAR/PLS OXIMETRY MLT: CPT

## 2020-08-23 PROCEDURE — 1200000000 HC SEMI PRIVATE

## 2020-08-23 PROCEDURE — 85025 COMPLETE CBC W/AUTO DIFF WBC: CPT

## 2020-08-23 PROCEDURE — 2580000003 HC RX 258: Performed by: INTERNAL MEDICINE

## 2020-08-23 PROCEDURE — 94640 AIRWAY INHALATION TREATMENT: CPT

## 2020-08-23 RX ORDER — POTASSIUM CHLORIDE 20 MEQ/1
20 TABLET, EXTENDED RELEASE ORAL ONCE
Status: COMPLETED | OUTPATIENT
Start: 2020-08-23 | End: 2020-08-23

## 2020-08-23 RX ORDER — MORPHINE SULFATE 2 MG/ML
2 INJECTION, SOLUTION INTRAMUSCULAR; INTRAVENOUS EVERY 4 HOURS PRN
Status: DISCONTINUED | OUTPATIENT
Start: 2020-08-23 | End: 2020-09-02 | Stop reason: HOSPADM

## 2020-08-23 RX ORDER — ONDANSETRON 2 MG/ML
4 INJECTION INTRAMUSCULAR; INTRAVENOUS EVERY 6 HOURS PRN
Status: DISCONTINUED | OUTPATIENT
Start: 2020-08-23 | End: 2020-09-02 | Stop reason: HOSPADM

## 2020-08-23 RX ADMIN — TIOTROPIUM BROMIDE INHALATION SPRAY 2 PUFF: 3.12 SPRAY, METERED RESPIRATORY (INHALATION) at 07:55

## 2020-08-23 RX ADMIN — CLOPIDOGREL BISULFATE 75 MG: 75 TABLET ORAL at 08:46

## 2020-08-23 RX ADMIN — TRAMADOL HYDROCHLORIDE 50 MG: 50 TABLET, FILM COATED ORAL at 00:35

## 2020-08-23 RX ADMIN — POTASSIUM CHLORIDE 20 MEQ: 20 TABLET, EXTENDED RELEASE ORAL at 08:46

## 2020-08-23 RX ADMIN — FUROSEMIDE 20 MG/HR: 10 INJECTION, SOLUTION INTRAMUSCULAR; INTRAVENOUS at 21:04

## 2020-08-23 RX ADMIN — ARIPIPRAZOLE 10 MG: 10 TABLET ORAL at 08:46

## 2020-08-23 RX ADMIN — TRAMADOL HYDROCHLORIDE 50 MG: 50 TABLET, FILM COATED ORAL at 15:01

## 2020-08-23 RX ADMIN — ONDANSETRON 4 MG: 2 INJECTION INTRAMUSCULAR; INTRAVENOUS at 17:31

## 2020-08-23 RX ADMIN — FUROSEMIDE 20 MG/HR: 10 INJECTION, SOLUTION INTRAMUSCULAR; INTRAVENOUS at 03:40

## 2020-08-23 RX ADMIN — MAGNESIUM OXIDE TAB 400 MG (241.3 MG ELEMENTAL MG) 400 MG: 400 (241.3 MG) TAB at 08:45

## 2020-08-23 RX ADMIN — FERROUS SULFATE TAB 325 MG (65 MG ELEMENTAL FE) 325 MG: 325 (65 FE) TAB at 08:46

## 2020-08-23 RX ADMIN — DOCUSATE SODIUM 100 MG: 100 CAPSULE, LIQUID FILLED ORAL at 08:46

## 2020-08-23 RX ADMIN — Medication 10 ML: at 20:08

## 2020-08-23 RX ADMIN — ASPIRIN 81 MG: 81 TABLET, COATED ORAL at 08:46

## 2020-08-23 RX ADMIN — INSULIN LISPRO 2 UNITS: 100 INJECTION, SOLUTION INTRAVENOUS; SUBCUTANEOUS at 08:51

## 2020-08-23 RX ADMIN — MORPHINE SULFATE 2 MG: 2 INJECTION, SOLUTION INTRAMUSCULAR; INTRAVENOUS at 20:08

## 2020-08-23 RX ADMIN — TRAMADOL HYDROCHLORIDE 50 MG: 50 TABLET, FILM COATED ORAL at 08:46

## 2020-08-23 RX ADMIN — LAMOTRIGINE 200 MG: 100 TABLET ORAL at 08:46

## 2020-08-23 RX ADMIN — MORPHINE SULFATE 2 MG: 2 INJECTION, SOLUTION INTRAMUSCULAR; INTRAVENOUS at 12:19

## 2020-08-23 RX ADMIN — INSULIN GLARGINE 18 UNITS: 100 INJECTION, SOLUTION SUBCUTANEOUS at 21:04

## 2020-08-23 RX ADMIN — FUROSEMIDE 20 MG/HR: 10 INJECTION, SOLUTION INTRAMUSCULAR; INTRAVENOUS at 09:18

## 2020-08-23 RX ADMIN — MIDODRINE HYDROCHLORIDE 5 MG: 5 TABLET ORAL at 16:16

## 2020-08-23 RX ADMIN — POTASSIUM CHLORIDE 20 MEQ: 20 TABLET, EXTENDED RELEASE ORAL at 16:16

## 2020-08-23 RX ADMIN — MIDODRINE HYDROCHLORIDE 5 MG: 5 TABLET ORAL at 08:46

## 2020-08-23 RX ADMIN — SPIRONOLACTONE 50 MG: 25 TABLET ORAL at 08:46

## 2020-08-23 RX ADMIN — BUSPIRONE HYDROCHLORIDE 30 MG: 5 TABLET ORAL at 20:08

## 2020-08-23 RX ADMIN — ALBUMIN (HUMAN) 25 G: 0.25 INJECTION, SOLUTION INTRAVENOUS at 00:36

## 2020-08-23 RX ADMIN — LAMOTRIGINE 200 MG: 100 TABLET ORAL at 20:08

## 2020-08-23 RX ADMIN — INSULIN LISPRO 1 UNITS: 100 INJECTION, SOLUTION INTRAVENOUS; SUBCUTANEOUS at 21:04

## 2020-08-23 RX ADMIN — FUROSEMIDE 20 MG/HR: 10 INJECTION, SOLUTION INTRAMUSCULAR; INTRAVENOUS at 14:58

## 2020-08-23 RX ADMIN — ATORVASTATIN CALCIUM 80 MG: 80 TABLET, FILM COATED ORAL at 20:09

## 2020-08-23 RX ADMIN — ALBUMIN (HUMAN) 25 G: 0.25 INJECTION, SOLUTION INTRAVENOUS at 12:21

## 2020-08-23 RX ADMIN — PANTOPRAZOLE SODIUM 40 MG: 40 TABLET, DELAYED RELEASE ORAL at 08:46

## 2020-08-23 RX ADMIN — PANTOPRAZOLE SODIUM 40 MG: 40 TABLET, DELAYED RELEASE ORAL at 16:16

## 2020-08-23 RX ADMIN — FERROUS SULFATE TAB 325 MG (65 MG ELEMENTAL FE) 325 MG: 325 (65 FE) TAB at 16:16

## 2020-08-23 RX ADMIN — INSULIN LISPRO 2 UNITS: 100 INJECTION, SOLUTION INTRAVENOUS; SUBCUTANEOUS at 16:21

## 2020-08-23 RX ADMIN — Medication 10 ML: at 08:47

## 2020-08-23 RX ADMIN — BUSPIRONE HYDROCHLORIDE 30 MG: 5 TABLET ORAL at 08:46

## 2020-08-23 RX ADMIN — BENZTROPINE MESYLATE 1 MG: 1 TABLET ORAL at 20:09

## 2020-08-23 ASSESSMENT — PAIN DESCRIPTION - DESCRIPTORS
DESCRIPTORS: ACHING
DESCRIPTORS: SORE

## 2020-08-23 ASSESSMENT — PAIN DESCRIPTION - PAIN TYPE: TYPE: ACUTE PAIN

## 2020-08-23 ASSESSMENT — PAIN DESCRIPTION - LOCATION: LOCATION: ABDOMEN

## 2020-08-23 ASSESSMENT — PAIN DESCRIPTION - ORIENTATION: ORIENTATION: RIGHT;UPPER;MID

## 2020-08-23 ASSESSMENT — PAIN SCALES - GENERAL
PAINLEVEL_OUTOF10: 4
PAINLEVEL_OUTOF10: 8
PAINLEVEL_OUTOF10: 8
PAINLEVEL_OUTOF10: 0
PAINLEVEL_OUTOF10: 8
PAINLEVEL_OUTOF10: 7
PAINLEVEL_OUTOF10: 8
PAINLEVEL_OUTOF10: 0
PAINLEVEL_OUTOF10: 9
PAINLEVEL_OUTOF10: 9
PAINLEVEL_OUTOF10: 8

## 2020-08-23 ASSESSMENT — PAIN - FUNCTIONAL ASSESSMENT
PAIN_FUNCTIONAL_ASSESSMENT: 0-10
PAIN_FUNCTIONAL_ASSESSMENT: PREVENTS OR INTERFERES SOME ACTIVE ACTIVITIES AND ADLS
PAIN_FUNCTIONAL_ASSESSMENT: 0-10

## 2020-08-23 ASSESSMENT — PAIN DESCRIPTION - ONSET: ONSET: ON-GOING

## 2020-08-23 ASSESSMENT — PAIN DESCRIPTION - PROGRESSION
CLINICAL_PROGRESSION: NOT CHANGED
CLINICAL_PROGRESSION: NOT CHANGED

## 2020-08-23 ASSESSMENT — PAIN DESCRIPTION - FREQUENCY: FREQUENCY: CONTINUOUS

## 2020-08-23 NOTE — PROGRESS NOTES
[facility] 79   Daily Progress Note    Admit Date:  8/18/2020  HPI:   Presented with right sided abdominal pain radiating to right chest, associated with shortness of breath, weight gain and swelling. Troponin and BNP elevated. Hx of CAD s/p PCI to LAD and LCx 2018, CABG X1 (LIMA to LAD,Jan 2020 for restenosis of LAD and LCx and significant dz in RCA). Hx of ICM, sCHF s/p CardioMEMs implant (2/2019), MR, PAD, CKD, DM, COPD, leg wounds. She was recently admitted following left renal artery angioplasty and stenting complicated by perinephric hematoma requiring coil embolization of bleeding vessel. Subjective:  Ms. Lucia Torres + fatigue, + RUQ abdominal pain, still no BM. Objective:   /78   Pulse 89   Temp 97.8 °F (36.6 °C) (Oral)   Resp 18   Ht 5' 4.5\" (1.638 m)   Wt 252 lb 8 oz (114.5 kg)   LMP 10/24/2012   SpO2 96%   BMI 42.67 kg/m²       Intake/Output Summary (Last 24 hours) at 8/23/2020 1117  Last data filed at 8/23/2020 0827  Gross per 24 hour   Intake 340 ml   Output 2000 ml   Net -1660 ml     Wt Readings from Last 3 Encounters:   08/23/20 252 lb 8 oz (114.5 kg)   08/18/20 250 lb (113.4 kg)   08/12/20 255 lb 1.6 oz (115.7 kg)         ASSESSMENT:   1. CHF, acute on chronic combined and right heart failure - remains fluid overloaded, on Lasix gtt at 20 mg/hr, PAD essentially unchanged, UOP improved with albumin, weight down 2 lbs, net neg 8 liters  2. Cardiomyopathy, ischemic - EF 25% by limited echo, unchanged; not on ACEi/ARB/ARNI due to hypotension and CKD  3. CAD s/p PCI's and CABG - stress test with lateral reversible ischemia, CABG to LAD, LCx and RCA still with disease, on high dose statin, DAPT  4. Hypotension - on midodrine, stable  5. HLD - on high dose statin  6. CKD3 - overall stable  7. Hyponatremia - improved  8. Hypokalemia -improved  9. DM - stable      PLAN:  1. Continue diuresis per nephrology (albumin, metolazone?)  2.  LHC +/- PCI when diuresed and renal function stable   3. Continue DAPT, statin  4.  No ACEi/ARB/ARNI or BB due to hypotension and CKD    Aniket Mendoza, APRN - CNP, 8/23/2020, 11:17 AM  [facility]    [phone]                 Telemetry: SR  80-90's  NYHA: IV    Physical Exam:  General:  Awake, alert, NAD  Skin:  Warm and dry  Neck:  JVP difficult to assess  Chest:  Diminished but no overt rales, wheezes or rhonchi  Cardiovascular:  RRR, normal S1S2, + RV heave, soft systolic murmur, no g/r  Abdomen:  + tender, + anasarca (pitting edema to flanks bilat), +bowel sounds  Extremities:  2+ BLE edema      Medications:    albumin human  25 g Intravenous Q12H    docusate sodium  100 mg Oral Daily    ARIPiprazole  10 mg Oral Daily    aspirin EC  81 mg Oral Daily    benztropine  1 mg Oral Nightly    buprenorphine-naloxone  1 Film Sublingual BID    clopidogrel  75 mg Oral Daily    ferrous sulfate  325 mg Oral BID WC    lamoTRIgine  200 mg Oral BID    magnesium oxide  400 mg Oral Daily    midodrine  5 mg Oral BID WC    pantoprazole  40 mg Oral BID    potassium chloride  20 mEq Oral Daily    spironolactone  50 mg Oral Daily    tiotropium  2 puff Inhalation Daily    insulin lispro  0-12 Units Subcutaneous TID WC    insulin lispro  0-6 Units Subcutaneous Nightly    busPIRone  30 mg Oral BID    insulin glargine  18 Units Subcutaneous Nightly    [Held by provider] atorvastatin  80 mg Oral Nightly    sodium chloride flush  10 mL Intravenous 2 times per day      furosemide (LASIX) 1mg/ml infusion 20 mg/hr (08/23/20 0918)    dextrose         Lab Data: Lab results independently reviewed by myself 8/20/20   CBC:   Recent Labs     08/21/20 0620 08/22/20  0620 08/23/20  0733   WBC 7.5 8.5 7.4   HGB 9.3* 9.5* 9.3*    320 313     BMP:    Recent Labs     08/21/20 0620 08/22/20  0620 08/23/20  0733   * 125* 131*   K 3.7 3.3* 3.6   CO2 31 31 30   BUN 55* 51* 52*   CREATININE 1.6* 1.5* 1.5*     INR:    No results for input(s): INR in the last 72 hours. BNP:    No results for input(s): PROBNP in the last 72 hours. Cardiac Enzymes:   No results for input(s): TROPONINI in the last 72 hours. Lipids:   Lab Results   Component Value Date    TRIG 78 01/27/2020    TRIG 140 01/11/2020    HDL 21 01/27/2020    HDL 27 01/11/2020    LDLCALC 21 01/27/2020    LDLCALC 103 01/11/2020    LDLDIRECT 187 08/01/2017       Cardiac Imaging:   STRESS MPI 8/22/20:    Summary    Calculated LVEF is not reliable on study which is technically difficult, estimate of 51% is likely an overestimate     Mid-distal anteroseptal/apical akinesis    Large fixed defects in anteroseptal/apical walls    Mild to moderate reversibility in lateral walls consistent with ischemia    Overall, this would be considered an abnormal high risk study       LIMITED ECHO 8/19/20:    Summary   Limited only f/u for LVEF and mitral regurgitation. Technically difficult examination. The left ventricular systolic function is severely reduced with an ejection fraction of 25%. There is hypokinesis of the apex, apical lateral, apical septum, anterioseptum and anterior walls. Compared to previous study from 7-1-2020 no changes noted in left ventricular function. Moderate mitral regurgitation. LIMITED ECHO 6/29/20:    Summary   Technically difficult examination. Limited only f/u for LVEF. and mitral regurgitation. The left ventricular systolic function is severely reduced with an ejection fraction of 25 %. There is hypokinesis of the apex, apical lateral, apical septum and anteroseptum walls. Moderate mitral regurgitation. Surgery: 1/27/20 Urgent coronary artery bypass grafting surgery x1 with pedicled left internal mammary artery to the LAD. Cardiopulmonary bypass with on-pump beating-heart technique, no cross-clamp. Transesophageal echo. Epiaortic ultrasound. [place] catheter placement. Doppler verification of grafts.  Bilateral five-level intercostal nerve block with

## 2020-08-23 NOTE — PROGRESS NOTES
Hospitalist Progress Note      PCP: Wally Hinkle PA-C    Date of Admission: 8/18/2020    Chief Complaint: RUQ abdominal pain    Hospital Course: see H&P     Subjective:     Persistent Abd/flank pain. LFTs elevated. Pending [facility] tomorrow.        Medications:  Reviewed    Infusion Medications    furosemide (LASIX) 1mg/ml infusion 20 mg/hr (08/23/20 0918)    dextrose       Scheduled Medications    albumin human  25 g Intravenous Q12H    docusate sodium  100 mg Oral Daily    ARIPiprazole  10 mg Oral Daily    aspirin EC  81 mg Oral Daily    benztropine  1 mg Oral Nightly    buprenorphine-naloxone  1 Film Sublingual BID    clopidogrel  75 mg Oral Daily    ferrous sulfate  325 mg Oral BID WC    lamoTRIgine  200 mg Oral BID    magnesium oxide  400 mg Oral Daily    midodrine  5 mg Oral BID WC    pantoprazole  40 mg Oral BID    potassium chloride  20 mEq Oral Daily    spironolactone  50 mg Oral Daily    tiotropium  2 puff Inhalation Daily    insulin lispro  0-12 Units Subcutaneous TID WC    insulin lispro  0-6 Units Subcutaneous Nightly    busPIRone  30 mg Oral BID    insulin glargine  18 Units Subcutaneous Nightly    [Held by provider] atorvastatin  80 mg Oral Nightly    sodium chloride flush  10 mL Intravenous 2 times per day     PRN Meds: morphine, magnesium hydroxide, perflutren lipid microspheres, traMADol, traZODone, glucose, dextrose, glucagon (rDNA), dextrose, sodium chloride flush, acetaminophen **OR** acetaminophen, polyethylene glycol, albuterol      Intake/Output Summary (Last 24 hours) at 8/23/2020 1456  Last data filed at 8/23/2020 1225  Gross per 24 hour   Intake 480 ml   Output 1825 ml   Net -1345 ml       Physical Exam Performed:    /82   Pulse 96   Temp 97.2 °F (36.2 °C) (Oral)   Resp 18   Ht 5' 4.5\" (1.638 m)   Wt 252 lb 8 oz (114.5 kg)   LMP 10/24/2012   SpO2 95%   BMI 42.67 kg/m²     General appearance:  No apparent distress, appears stated age and cooperative. HEENT:  Normal cephalic, atraumatic without obvious deformity. Pupils equal, round, and reactive to light. Extra ocular muscles intact. Conjunctivae/corneas clear. Neck: Supple, with full range of motion. No jugular venous distention. Trachea midline. Respiratory:  Normal respiratory effort. Bibasilar rales. No wheezing. Cardiovascular:  Regular rate and rhythm with normal S1/S2 without murmurs, rubs or gallops. Abdomen: Soft, RUQ tender,+guarding non-distended with normal bowel sounds. Musculoskeletal:  No clubbing, cyanosis. 2+ BLE pitting edema. Also edema of abdominal wall. Full range of motion without deformity. Skin: Skin color, texture, turgor normal.  No rashes or lesions. Neurologic:  Neurovascularly intact without any focal sensory/motor deficits. Cranial nerves: II-XII intact, grossly non-focal.  Psychiatric:  Alert and oriented, thought content appropriate, normal insight  Capillary Refill: Brisk,< 3 seconds   Peripheral Pulses: +2 palpable, equal bilaterally     Labs:   Recent Labs     08/21/20 0620 08/22/20 0620 08/23/20  0733   WBC 7.5 8.5 7.4   HGB 9.3* 9.5* 9.3*   HCT 29.2* 29.9* 29.1*    320 313     Recent Labs     08/21/20 0620 08/22/20 0620 08/23/20  0733   * 125* 131*   K 3.7 3.3* 3.6   CL 85* 82* 86*   CO2 31 31 30   BUN 55* 51* 52*   CREATININE 1.6* 1.5* 1.5*   CALCIUM 9.1 9.0 9.4   PHOS 3.2 2.9 2.6     Recent Labs     08/22/20  0620 08/23/20  0733   AST 15 15   ALT 10 10   BILIDIR 1.5* 1.7*   BILITOT 2.6* 3.1*   ALKPHOS 284* 276*     No results for input(s): INR in the last 72 hours. No results for input(s): Amaryllis Goodell in the last 72 hours.     Urinalysis:      Lab Results   Component Value Date    NITRU Negative 08/18/2020    WBCUA 0-2 08/18/2020    BACTERIA Rare 08/18/2020    RBCUA 0-2 08/18/2020    BLOODU Negative 08/18/2020    SPECGRAV 1.010 08/18/2020    GLUCOSEU Negative 08/18/2020       Radiology:  [facility] Cardiac Stress Test Nuclear Imaging   Final Result      NM LUNG SCAN PERFUSION ONLY   Final Result   Low probability for pulmonary embolism. US GALLBLADDER RUQ    (Results Pending)           Assessment/Plan:    Active Hospital Problems    Diagnosis    Acute on chronic right-sided heart failure (HCC) [I50.813]    Right upper quadrant pain [R10.11]    Acute on chronic combined systolic and diastolic congestive heart failure (HCC) [I50.43]     The patient is a pleasant, chronically ill-appearing 62 Y F with a h/o COPD, HTN, HLD, DM2, CAD s/p stents and CABG, ischemic cardiomyopathy, and CHF. She has extensive vascular disease and is s/p a LUE bypass, a LLE angioplasty (then again when it reoccluded), and a RLE bypass. She continues to smoke. She was recently diagnosed with L renal artery stenosis and underwent a L renal artery angioplasty and stent here on 8/4. She was discharged home that day but returned to the ED a few hours later with L flank pain and was admitted with a L perinephric subcapsular hematoma. She required 2u pRBCs and a coil embolization of a L renal artery branch on 8/5. Her hospitalization was complicated by SHAUNNA and hyponatremia, and with the help of nephrology each of these issues improved a little by the time she was discharged on 8/12. Now the patient presents with new sharp RUQ pain which radiates to her R posterior flank, R chest, and R shoulder. It started around 8pm last night, and she denies having any pain in this area previously. It hurts her badly to breath or move. She is very tender in the RUQ. She is s/p cholecystectomy. Her LFTs have been newly abnormal for the last couple weeks.        RUQ pain, suspect due to congestive hepatopathy, due to acute on chronic systolic CHF  - she had been on spironolactone and PRN metolazone. Then last admission she was discharged with spironolactone and daily torsemide. She remains of lasix gtt here, currently at 20mg/hour.   She has had no improvement, in fact, pain has worsened. Her bili/Alk phos are elevated. Given persistence, we consulted GI for recs. This is pending.   - no ACE/ARB/ARNI due to unstable renal status. Start BB if BP allows for this, but would first wean off her chronic midodrine if possible. - cardiology consulted, input noted. -Continue to trend LFTs  -Pain meds adjusted. Acute on chronic combined systolic and diastolic heart failure with fluid overload, LVEF of 25%-unchanged, not on ACE inhibitor or ARB due to CKD. Her stress testing was abnormal yesterday, and will warrant LHC tomorrow AM.      - she has chronic nonspecific pains and is on suboxone.      CKD3  - baseline Cr is probably about 1.5   Monitor during diuresis. -nephrology following, appreciate assistance.      Hyponatremia  - it looks like her baseline is in the high 120's, even when accounting for hyperglycemia. After a week of aggressive management, including multiple doses of tolvaptan, she still was only discharged at 126 last time. Monitor response to diuresis and consider nephrology consultation again if this isn't improving. Continuing IV Lasix, nephrology input noted.     CAD  - aspirin, clopidogrel. OK to resume high does statin for now. Monitor for worsening LFTs.      DM2  - adjusted insulin regimen while here. Recent A1c 7.2.     14.7 cm L perinephric subcapsular hematoma was stable on repeat CT on this admission. Monitor. DVT Prophylaxis: SCD  Diet: DIET CARB CONTROL; No Added Salt (3-4 GM); Daily Fluid Restriction: 2000 ml  Code Status: Full Code    PT/OT Eval Status: Not ordered    Dispo - unclear, >3 days likely.      Ketan Badillo, APRN - CNP

## 2020-08-23 NOTE — PROGRESS NOTES
[facility]       Progress Note           Subjective/   62y.o. year old female who we are seeing in consultation for SHAUNNA/CKD. HPI:  Renal function stable with diuresis, urine output of 2.6 liters over last 24 hours. States sob, edema persists. ROS:  Intake adequate, no lightheadedness. Objective/   GEN:  Chronically ill, /82   Pulse 96   Temp 97.2 °F (36.2 °C) (Oral)   Resp 18   Ht 5' 4.5\" (1.638 m)   Wt 252 lb 8 oz (114.5 kg)   LMP 10/24/2012   SpO2 95%   BMI 42.67 kg/m²   HEENT: non-icteric, no JVD  CV: S1, S2 without m/r/g; ++ LE edema  RESP: CTA B without w/r/r; breathing wnl  ABD: +bs, soft, nt, no hsm  SKIN: warm, no rashes    Data/  Recent Labs     08/21/20  0620 08/22/20  0620 08/23/20  0733   WBC 7.5 8.5 7.4   HGB 9.3* 9.5* 9.3*   HCT 29.2* 29.9* 29.1*   MCV 81.6 81.8 81.7    320 313     Recent Labs     08/21/20  0620 08/22/20  0620 08/23/20  0733   * 125* 131*   K 3.7 3.3* 3.6   CL 85* 82* 86*   CO2 31 31 30   GLUCOSE 155* 144* 143*   PHOS 3.2 2.9 2.6   MG  --   --  2.30   BUN 55* 51* 52*   CREATININE 1.6* 1.5* 1.5*   LABGLOM 33* 36* 36*   GFRAA 40* 43* 43*       Assessment/Plan     SHAUNNA/CKD stage 3  - Etiology: ATN (hypotension; contrast dye) from last admission  - Data: SCr peaked at 2.0, now back in low 1 range  - Plan: Continue lasix drip at 20 mg/hour & aldactone for diuresis, added course of albumin over weekend     CHF - acute on chronic  - Diuresis as above  - 2 liter/day fluid restriction  - Strict I's/O's, daily weights   - CardioMEMS monitoring per Cardiology     Recent Perinephric hematoma. - Complication of L renal artery angioplasty / stent placement  - S/P coil embolization of subsegmental L renal artery  - Stable in size based on CT this admission     Renovascular HTN.  - S/P L renal artery angioplasty / stent placement  - BP satisfactory.   - On scheduled midodrine 5 mg po bid  - Follow for late recurrence of HTN (post-perinephric bleed) (I.e, Page kidney).     Anemia - blood loss. - Hgb stabilized in 8-9 range now  - S/P transfusion 2 units PRBC's last admission, oral Fe added.     Hyponatremia  - Has been present consistently since 4/20   - ?  Etiology - likely multifactorial with pain, volume excess / CHF       - CHF and ? lamictal may be cause of chronic hyponatremia  - Rec'd Tolvaptan last admission with sodium as low as 120, off metolazone     Hypokalemia.  - Scheduled K replacement 20 meq a day with additional prn replacement 20 meq today

## 2020-08-23 NOTE — PROGRESS NOTES
Per pt, hasn't had a bm since 8-17-20. Per pt, takes miralax, colace and MOM at home. Administered miralax per MAR. Previously had colace. Paged cross cover. MOM ordered. Administered per MAR. BCS nearby. Instructed pt to call out before getting up. Pt verbalized understanding.

## 2020-08-23 NOTE — CONSULTS
Gastroenterology Consult Note    Patient:   Maribel Fraser   :    1965   Facility:   [facility]   Referring/PCP: Deena Abdi  Date:     2020  Consultant:   Gertrude Ramírez MD      No chief complaint on file. History of Present illness     62year old female with history of HTN, HLD, COPD, DM, bipolar do, CAD s/p CABG, PVD s/p LUE and RLE bypass, Renal artery stenosis s/p stent c/b subcapsular hematoma s/p coil embolization, CKD, CHF and GERD presented to ER with complaints of RUQ pain. Her symptoms progressively worsened RUQ pain radiating to her R posterior flank, R chest, and R shoulder. She was admitted with CHF exacerbation and is being aggressively treated with IV lasix gtt. She was also noted to have elevated LFTs. She has no prior history of liver disease. She denies alcohol use. She denies any recent change in medications. Her stress test is positive. She is planned for [address] and possible PCI.      Past Medical History:   Diagnosis Date    Acute kidney injury ([address] 2019    Acute on chronic congestive heart failure (HCC)     Alcohol dependence ([address] 3/10/2010    Bipolar 1 disorder ([address]     CAD (coronary artery disease)     Cardiomyopathy ([address] 2020    EF= 25%    Cellulitis 6/3/2020    CHF (congestive heart failure) (HCC)     COPD (chronic obstructive pulmonary disease) (HCC)     Diabetic ulcer of left foot associated with type 2 diabetes mellitus ([address] 2020    Diabetic ulcer of toe of right foot associated with type 2 diabetes mellitus ([address] 2020    GERD (gastroesophageal reflux disease)     High cholesterol     HTN (hypertension)     Kidney disease, chronic, stage II (mild, EGFR 60+ ml/min)     MI (myocardial infarction) ([address] 10/07/2019    NSTEMI    Positive FIT (fecal immunochemical test) 2020    Pulmonary nodule     PVD (peripheral vascular disease) ([address]      Past Surgical History:   Procedure Laterality Date    ARTERIAL BYPASS SURGRY Left 01/06/2016    Axillary/Subclavian to Brachial Bypass w/reversed SVG    CARDIAC CATHETERIZATION  03/27/2018    Dr. Shayne Ivey - at [address]  01/20/2020    Dr. Bisi Dewitt      pancreatitis post surgery    CORONARY ANGIOPLASTY WITH STENT PLACEMENT  03/16/2018    MELODY- 3.5 x 38 and 3.5 x 20 to Cx    CORONARY ANGIOPLASTY WITH STENT PLACEMENT  01/03/2018    MELODY- 3.0 x 38 and 2.75 x 26 and 2.75 x 8 and 2.75 x 22 to the LAD    CORONARY ANGIOPLASTY WITH STENT PLACEMENT  10/07/2019    MELODY- 2.5 x 8 to [address] GRAFT N/A 1/27/2020    CORONARY ARTERY BYPASS GRAFTING X 1, ON PUMP BEATING HEART, INTERNAL MAMMARY ARTERY TO LEFT ANTERIOR DESCENDING ARTERY, VAS CATH INSERTION, URI, PLATELET GEL APPLICATION, 5 LEVEL BILATERAL INTERCOSTAL NERVE BLOCK, STERNAL PLATING performed by Ruchi Hollingsworth MD at [address] [place] [address] Right 5/16/2019    fem-pop, performed by Adelia Zavaleta MD at [address]  02/14/2019    CardioMEMs insertion for CHF    KIDNEY SURGERY      ROTATOR CUFF REPAIR Left 04/22/2016    TONSILLECTOMY      TRANSESOPHAGEAL ECHOCARDIOGRAM  01/26/2020    TRANSLUMINAL ANGIOPLASTY Left 10/08/2019    with stenting, at [facility]    [address] Left 04/29/2020    of the [facility] re-occlusion    TUMOR EXCISION      benign tumors X 2 chest & axilla    UPPER GASTROINTESTINAL ENDOSCOPY  4/25/2013    BX barretts, HH, gastritis    UPPER GASTROINTESTINAL ENDOSCOPY  12/10/2015    UPPER GASTROINTESTINAL ENDOSCOPY  01/06/2017    Gastritis    VASCULAR SURGERY Left 12/08/2015    upper extremity and mesenteric angiogram (diagnostic only)    VASCULAR SURGERY Bilateral 07/07/2020    diagnostic lower extremity angiogram only       Social:   Social History     Tobacco Use    Smoking status: Current Every Day Smoker     Packs/day: 0.50     Years: 30.00     Pack years: 15.00     Types: Cigarettes     Last attempt to quit: 2019     Years since quittin.0    Smokeless tobacco: Never Used    Tobacco comment: trying to quit, one cigarette daily   Substance Use Topics    Alcohol use: No     Comment: quit       Family:   Family History   Problem Relation Age of Onset    Heart Disease Maternal Grandmother     Cancer Mother         lung and brain cancer    Cancer Maternal Aunt         lung and brain cancer     No current facility-administered medications on file prior to encounter. Current Outpatient Medications on File Prior to Encounter   Medication Sig Dispense Refill    insulin aspart (NOVOLOG FLEXPEN) 100 UNIT/ML injection pen Inject 10 Units into the skin 3 times daily (before meals) 5 pen 3    torsemide (DEMADEX) 100 MG tablet Take 1 tablet by mouth daily 100 mg daily except 50 mg twice weekly (Monday and Thursday).  30 tablet 0    spironolactone (ALDACTONE) 50 MG tablet Take 1 tablet by mouth daily 30 tablet 0    ferrous sulfate (IRON 325) 325 (65 Fe) MG tablet Take 1 tablet by mouth 2 times daily (with meals) 60 tablet 0    midodrine (PROAMATINE) 5 MG tablet Take 1 tablet by mouth 2 times daily (with meals) 60 tablet 0    potassium chloride (KLOR-CON M) 20 MEQ extended release tablet Take 1 tablet by mouth daily 30 tablet 0    traZODone (DESYREL) 100 MG tablet Take 100 mg by mouth nightly as needed for Sleep Can take 1-2 tabs nightly      DOXEPIN HCL PO Take 1 capsule by mouth nightly as needed Patient unsure of exact dosage      albuterol sulfate  (90 Base) MCG/ACT inhaler Inhale 2 puffs into the lungs every 6 hours as needed for Wheezing or Shortness of Breath 1 Inhaler 3    Insulin Degludec (TRESIBA FLEXTOUCH) 100 UNIT/ML SOPN Inject 30 Units into the skin nightly 10 pen 5    insulin lispro, 1 Unit Dial, 100 UNIT/ML SOPN Inject 10 Units into the skin 3 times daily 3 pen 2    atorvastatin (LIPITOR) 80 MG tablet Take 1 tablet by mouth nightly 90 tablet 3    tiotropium (SPIRIVA RESPIMAT) 2.5 MCG/ACT AERS inhaler INHALE 2 PUFFS INTO THE LUNGS DAILY 1 Inhaler 6    buprenorphine-naloxone (SUBOXONE) 8-2 MG FILM SL film Place 1 Film under the tongue 2 times daily.  benztropine (COGENTIN) 1 MG tablet Take 1 mg by mouth nightly       blood glucose test strips (EXACTECH TEST) strip 6 times daily. 200 strip 6    Insulin Pen Needle (B-D ULTRAFINE III SHORT PEN) 31G X 8 MM MISC Five shots a day 200 each 2    INSULIN SYRINGE .5CC/29G 29G X 1/2\" 0.5 ML MISC 1 each by Does not apply route daily 100 each 3    Liraglutide (VICTOZA) 18 MG/3ML SOPN SC injection Inject 1.2 mg into the skin daily 2 pen 3    pantoprazole (PROTONIX) 40 MG tablet Take 1 tablet by mouth 2 times daily 60 tablet 0    clopidogrel (PLAVIX) 75 MG tablet TAKE 1 TABLET BY MOUTH EVERY DAY 30 tablet 5    magnesium oxide (MAG-OX) 400 (240 Mg) MG tablet TAKE 1 TABLET ONCE DAILY 30 tablet 11    busPIRone (BUSPAR) 30 MG tablet Take 30 mg by mouth 2 times daily       aspirin EC 81 MG EC tablet Take 1 tablet by mouth daily 30 tablet 3    ARIPiprazole (ABILIFY) 10 MG tablet Take 10 mg by mouth daily       lamoTRIgine (LAMICTAL) 150 MG tablet Take 200 mg by mouth 2 times daily         Infusions:    furosemide (LASIX) 1mg/ml infusion 20 mg/hr (08/23/20 0918)    dextrose       PRN Medications: morphine, magnesium hydroxide, perflutren lipid microspheres, traMADol, traZODone, glucose, dextrose, glucagon (rDNA), dextrose, sodium chloride flush, acetaminophen **OR** acetaminophen, polyethylene glycol, albuterol  Allergies:    Allergies   Allergen Reactions    Lisinopril Other (See Comments)     Severe hypotension       ROS: Constitutional: negative for chills, fevers and sweats  Eyes: negative for cataracts, icterus and redness  Ears, nose, mouth, throat, and face: negative for epistaxis, hearing loss and sore throat  Respiratory: negative for cough, hemoptysis and sputum  Cardiovascular: negative for chest pain, dyspnea and lower extremity edema  Gastrointestinal: as per HPI  Genitourinary:negative for dysuria, frequency and hematuria  Neurological: negative for coordination problems, dizziness and gait problems  Behavioral/Psych: negative for anxiety, depression and mood swings    Physical Exam   /82   Pulse 96   Temp 97.2 °F (36.2 °C) (Oral)   Resp 18   Ht 5' 4.5\" (1.638 m)   Wt 252 lb 8 oz (114.5 kg)   LMP 10/24/2012   SpO2 95%   BMI 42.67 kg/m²     General appearance: alert, appears stated age and cooperative  Head: Normocephalic, without obvious abnormality, atraumatic  Eyes: conjunctivae/corneas clear. PERRL, EOM's intact. Fundi benign. Neck: no adenopathy and supple, symmetrical, trachea midline  Lungs: clear to auscultation bilaterally  Heart: regular rate and rhythm, S1, S2 normal, no murmur, click, rub or gallop  Abdomen: soft, non-tender; bowel sounds normal; no masses,  no organomegaly  Extremities: edema 2+    Lab and Imaging Review   Labs:  CBC:   Recent Labs     08/21/20 0620 08/22/20 0620 08/23/20  0733   WBC 7.5 8.5 7.4   HGB 9.3* 9.5* 9.3*   HCT 29.2* 29.9* 29.1*   MCV 81.6 81.8 81.7    320 313     BMP:   Recent Labs     08/21/20 0620 08/22/20 0620 08/23/20  0733   * 125* 131*   K 3.7 3.3* 3.6   CL 85* 82* 86*   CO2 31 31 30   PHOS 3.2 2.9 2.6   BUN 55* 51* 52*   CREATININE 1.6* 1.5* 1.5*     LIVER PROFILE:   Recent Labs     08/22/20 0620 08/23/20  0733   AST 15 15   ALT 10 10   PROT 7.3 7.5   BILIDIR 1.5* 1.7*   BILITOT 2.6* 3.1*   ALKPHOS 284* 276*     CT abd/pelvis:  1. Persistent left subcapsular/perinephric hematoma, not significantly    changed since August 4, 2020, measuring approximately 14.7 x 12.1 x 10.1 cm    (craniocaudal by AP by transverse).  The hematoma is compressing the left    kidney.  Hyperdense material in the left kidney probably represents    embolization coil or surgical clip.     2. Small left pleural effusion with left base

## 2020-08-24 ENCOUNTER — APPOINTMENT (OUTPATIENT)
Dept: ULTRASOUND IMAGING | Age: 55
DRG: 182 | End: 2020-08-24
Attending: INTERNAL MEDICINE
Payer: COMMERCIAL

## 2020-08-24 LAB
ALBUMIN SERPL-MCNC: 4 G/DL (ref 3.4–5)
ANION GAP SERPL CALCULATED.3IONS-SCNC: 15 MMOL/L (ref 3–16)
BASOPHILS ABSOLUTE: 0.1 K/UL (ref 0–0.2)
BASOPHILS RELATIVE PERCENT: 0.8 %
BUN BLDV-MCNC: 49 MG/DL (ref 7–20)
CALCIUM SERPL-MCNC: 9.6 MG/DL (ref 8.3–10.6)
CHLORIDE BLD-SCNC: 87 MMOL/L (ref 99–110)
CO2: 29 MMOL/L (ref 21–32)
CREAT SERPL-MCNC: 1.8 MG/DL (ref 0.6–1.1)
EOSINOPHILS ABSOLUTE: 0.1 K/UL (ref 0–0.6)
EOSINOPHILS RELATIVE PERCENT: 1.1 %
GFR AFRICAN AMERICAN: 35
GFR NON-AFRICAN AMERICAN: 29
GLUCOSE BLD-MCNC: 134 MG/DL (ref 70–99)
GLUCOSE BLD-MCNC: 145 MG/DL (ref 70–99)
GLUCOSE BLD-MCNC: 149 MG/DL (ref 70–99)
GLUCOSE BLD-MCNC: 152 MG/DL (ref 70–99)
GLUCOSE BLD-MCNC: 188 MG/DL (ref 70–99)
HCT VFR BLD CALC: 28.9 % (ref 36–48)
HEMOGLOBIN: 9.2 G/DL (ref 12–16)
LYMPHOCYTES ABSOLUTE: 0.6 K/UL (ref 1–5.1)
LYMPHOCYTES RELATIVE PERCENT: 9.3 %
MCH RBC QN AUTO: 26.3 PG (ref 26–34)
MCHC RBC AUTO-ENTMCNC: 31.7 G/DL (ref 31–36)
MCV RBC AUTO: 82.9 FL (ref 80–100)
MONOCYTES ABSOLUTE: 0.4 K/UL (ref 0–1.3)
MONOCYTES RELATIVE PERCENT: 6.5 %
NEUTROPHILS ABSOLUTE: 5.6 K/UL (ref 1.7–7.7)
NEUTROPHILS RELATIVE PERCENT: 82.3 %
PDW BLD-RTO: 25.3 % (ref 12.4–15.4)
PERFORMED ON: ABNORMAL
PHOSPHORUS: 3.3 MG/DL (ref 2.5–4.9)
PLATELET # BLD: 278 K/UL (ref 135–450)
PMV BLD AUTO: 8.4 FL (ref 5–10.5)
POTASSIUM SERPL-SCNC: 3.6 MMOL/L (ref 3.5–5.1)
PRO-BNP: 4560 PG/ML (ref 0–124)
RBC # BLD: 3.48 M/UL (ref 4–5.2)
SODIUM BLD-SCNC: 131 MMOL/L (ref 136–145)
WBC # BLD: 6.9 K/UL (ref 4–11)

## 2020-08-24 PROCEDURE — 6360000002 HC RX W HCPCS: Performed by: NURSE PRACTITIONER

## 2020-08-24 PROCEDURE — 85025 COMPLETE CBC W/AUTO DIFF WBC: CPT

## 2020-08-24 PROCEDURE — 2580000003 HC RX 258: Performed by: INTERNAL MEDICINE

## 2020-08-24 PROCEDURE — 36415 COLL VENOUS BLD VENIPUNCTURE: CPT

## 2020-08-24 PROCEDURE — 76705 ECHO EXAM OF ABDOMEN: CPT

## 2020-08-24 PROCEDURE — 6360000002 HC RX W HCPCS: Performed by: INTERNAL MEDICINE

## 2020-08-24 PROCEDURE — 94640 AIRWAY INHALATION TREATMENT: CPT

## 2020-08-24 PROCEDURE — 94761 N-INVAS EAR/PLS OXIMETRY MLT: CPT

## 2020-08-24 PROCEDURE — 97110 THERAPEUTIC EXERCISES: CPT

## 2020-08-24 PROCEDURE — 83880 ASSAY OF NATRIURETIC PEPTIDE: CPT

## 2020-08-24 PROCEDURE — 6370000000 HC RX 637 (ALT 250 FOR IP): Performed by: INTERNAL MEDICINE

## 2020-08-24 PROCEDURE — 80074 ACUTE HEPATITIS PANEL: CPT

## 2020-08-24 PROCEDURE — 2580000003 HC RX 258

## 2020-08-24 PROCEDURE — P9047 ALBUMIN (HUMAN), 25%, 50ML: HCPCS | Performed by: INTERNAL MEDICINE

## 2020-08-24 PROCEDURE — 97530 THERAPEUTIC ACTIVITIES: CPT

## 2020-08-24 PROCEDURE — 2060000000 HC ICU INTERMEDIATE R&B

## 2020-08-24 PROCEDURE — 99233 SBSQ HOSP IP/OBS HIGH 50: CPT | Performed by: NURSE PRACTITIONER

## 2020-08-24 PROCEDURE — 80069 RENAL FUNCTION PANEL: CPT

## 2020-08-24 RX ORDER — METOLAZONE 2.5 MG/1
5 TABLET ORAL DAILY
Status: DISCONTINUED | OUTPATIENT
Start: 2020-08-24 | End: 2020-08-24

## 2020-08-24 RX ORDER — POTASSIUM CHLORIDE 20 MEQ/1
20 TABLET, EXTENDED RELEASE ORAL 2 TIMES DAILY WITH MEALS
Status: DISCONTINUED | OUTPATIENT
Start: 2020-08-24 | End: 2020-08-25

## 2020-08-24 RX ORDER — METOLAZONE 2.5 MG/1
5 TABLET ORAL 2 TIMES DAILY
Status: DISCONTINUED | OUTPATIENT
Start: 2020-08-24 | End: 2020-08-25

## 2020-08-24 RX ORDER — SODIUM CHLORIDE 9 MG/ML
INJECTION, SOLUTION INTRAVENOUS
Status: COMPLETED
Start: 2020-08-24 | End: 2020-08-24

## 2020-08-24 RX ORDER — DOBUTAMINE HYDROCHLORIDE 200 MG/100ML
2.5 INJECTION INTRAVENOUS CONTINUOUS
Status: DISCONTINUED | OUTPATIENT
Start: 2020-08-24 | End: 2020-09-01

## 2020-08-24 RX ADMIN — PANTOPRAZOLE SODIUM 40 MG: 40 TABLET, DELAYED RELEASE ORAL at 15:39

## 2020-08-24 RX ADMIN — SODIUM CHLORIDE 100 ML: 900 INJECTION INTRAVENOUS at 00:34

## 2020-08-24 RX ADMIN — LAMOTRIGINE 200 MG: 100 TABLET ORAL at 21:58

## 2020-08-24 RX ADMIN — MAGNESIUM OXIDE TAB 400 MG (241.3 MG ELEMENTAL MG) 400 MG: 400 (241.3 MG) TAB at 10:18

## 2020-08-24 RX ADMIN — FUROSEMIDE 20 MG/HR: 10 INJECTION, SOLUTION INTRAMUSCULAR; INTRAVENOUS at 06:55

## 2020-08-24 RX ADMIN — POTASSIUM CHLORIDE 20 MEQ: 20 TABLET, EXTENDED RELEASE ORAL at 17:55

## 2020-08-24 RX ADMIN — FERROUS SULFATE TAB 325 MG (65 MG ELEMENTAL FE) 325 MG: 325 (65 FE) TAB at 10:19

## 2020-08-24 RX ADMIN — MIDODRINE HYDROCHLORIDE 5 MG: 5 TABLET ORAL at 17:55

## 2020-08-24 RX ADMIN — CLOPIDOGREL BISULFATE 75 MG: 75 TABLET ORAL at 10:19

## 2020-08-24 RX ADMIN — PANTOPRAZOLE SODIUM 40 MG: 40 TABLET, DELAYED RELEASE ORAL at 10:19

## 2020-08-24 RX ADMIN — ONDANSETRON 4 MG: 2 INJECTION INTRAMUSCULAR; INTRAVENOUS at 22:00

## 2020-08-24 RX ADMIN — TIOTROPIUM BROMIDE INHALATION SPRAY 2 PUFF: 3.12 SPRAY, METERED RESPIRATORY (INHALATION) at 07:26

## 2020-08-24 RX ADMIN — BENZTROPINE MESYLATE 1 MG: 1 TABLET ORAL at 21:57

## 2020-08-24 RX ADMIN — METOLAZONE 5 MG: 2.5 TABLET ORAL at 10:18

## 2020-08-24 RX ADMIN — ALBUMIN (HUMAN) 25 G: 0.25 INJECTION, SOLUTION INTRAVENOUS at 00:33

## 2020-08-24 RX ADMIN — FERROUS SULFATE TAB 325 MG (65 MG ELEMENTAL FE) 325 MG: 325 (65 FE) TAB at 17:55

## 2020-08-24 RX ADMIN — SPIRONOLACTONE 50 MG: 25 TABLET ORAL at 10:19

## 2020-08-24 RX ADMIN — MIDODRINE HYDROCHLORIDE 5 MG: 5 TABLET ORAL at 10:19

## 2020-08-24 RX ADMIN — LAMOTRIGINE 200 MG: 100 TABLET ORAL at 10:18

## 2020-08-24 RX ADMIN — METOLAZONE 5 MG: 2.5 TABLET ORAL at 21:58

## 2020-08-24 RX ADMIN — BUSPIRONE HYDROCHLORIDE 30 MG: 5 TABLET ORAL at 15:43

## 2020-08-24 RX ADMIN — Medication 10 ML: at 20:17

## 2020-08-24 RX ADMIN — INSULIN LISPRO 2 UNITS: 100 INJECTION, SOLUTION INTRAVENOUS; SUBCUTANEOUS at 17:56

## 2020-08-24 RX ADMIN — INSULIN LISPRO 1 UNITS: 100 INJECTION, SOLUTION INTRAVENOUS; SUBCUTANEOUS at 21:58

## 2020-08-24 RX ADMIN — ATORVASTATIN CALCIUM 80 MG: 80 TABLET, FILM COATED ORAL at 21:57

## 2020-08-24 RX ADMIN — POTASSIUM CHLORIDE 20 MEQ: 20 TABLET, EXTENDED RELEASE ORAL at 10:18

## 2020-08-24 RX ADMIN — MORPHINE SULFATE 2 MG: 2 INJECTION, SOLUTION INTRAMUSCULAR; INTRAVENOUS at 10:31

## 2020-08-24 RX ADMIN — TRAMADOL HYDROCHLORIDE 50 MG: 50 TABLET, FILM COATED ORAL at 15:43

## 2020-08-24 RX ADMIN — FUROSEMIDE 20 MG/HR: 10 INJECTION, SOLUTION INTRAMUSCULAR; INTRAVENOUS at 20:10

## 2020-08-24 RX ADMIN — MORPHINE SULFATE 2 MG: 2 INJECTION, SOLUTION INTRAMUSCULAR; INTRAVENOUS at 20:31

## 2020-08-24 RX ADMIN — INSULIN LISPRO 2 UNITS: 100 INJECTION, SOLUTION INTRAVENOUS; SUBCUTANEOUS at 13:15

## 2020-08-24 RX ADMIN — Medication 10 ML: at 10:18

## 2020-08-24 RX ADMIN — DOCUSATE SODIUM 100 MG: 100 CAPSULE, LIQUID FILLED ORAL at 10:19

## 2020-08-24 RX ADMIN — FUROSEMIDE 20 MG/HR: 10 INJECTION, SOLUTION INTRAMUSCULAR; INTRAVENOUS at 13:15

## 2020-08-24 RX ADMIN — DOBUTAMINE HYDROCHLORIDE 2.5 MCG/KG/MIN: 200 INJECTION INTRAVENOUS at 20:10

## 2020-08-24 RX ADMIN — TRAMADOL HYDROCHLORIDE 50 MG: 50 TABLET, FILM COATED ORAL at 00:46

## 2020-08-24 RX ADMIN — FUROSEMIDE 20 MG/HR: 10 INJECTION, SOLUTION INTRAMUSCULAR; INTRAVENOUS at 02:29

## 2020-08-24 RX ADMIN — TRAZODONE HYDROCHLORIDE 100 MG: 50 TABLET ORAL at 00:46

## 2020-08-24 RX ADMIN — INSULIN GLARGINE 18 UNITS: 100 INJECTION, SOLUTION SUBCUTANEOUS at 21:58

## 2020-08-24 RX ADMIN — MORPHINE SULFATE 2 MG: 2 INJECTION, SOLUTION INTRAMUSCULAR; INTRAVENOUS at 02:29

## 2020-08-24 RX ADMIN — ASPIRIN 81 MG: 81 TABLET, COATED ORAL at 10:18

## 2020-08-24 RX ADMIN — ARIPIPRAZOLE 10 MG: 10 TABLET ORAL at 10:19

## 2020-08-24 ASSESSMENT — PAIN DESCRIPTION - PAIN TYPE
TYPE: ACUTE PAIN

## 2020-08-24 ASSESSMENT — PAIN SCALES - GENERAL
PAINLEVEL_OUTOF10: 8
PAINLEVEL_OUTOF10: 3
PAINLEVEL_OUTOF10: 8
PAINLEVEL_OUTOF10: 5
PAINLEVEL_OUTOF10: 9
PAINLEVEL_OUTOF10: 0

## 2020-08-24 ASSESSMENT — PAIN DESCRIPTION - DESCRIPTORS
DESCRIPTORS: ACHING
DESCRIPTORS: ACHING
DESCRIPTORS: PRESSURE;SORE

## 2020-08-24 ASSESSMENT — PAIN - FUNCTIONAL ASSESSMENT
PAIN_FUNCTIONAL_ASSESSMENT: PREVENTS OR INTERFERES SOME ACTIVE ACTIVITIES AND ADLS

## 2020-08-24 ASSESSMENT — PAIN DESCRIPTION - FREQUENCY
FREQUENCY: CONTINUOUS

## 2020-08-24 ASSESSMENT — PAIN DESCRIPTION - ORIENTATION
ORIENTATION: RIGHT;MID
ORIENTATION: RIGHT
ORIENTATION: RIGHT;UPPER;MID
ORIENTATION: RIGHT
ORIENTATION: RIGHT
ORIENTATION: RIGHT;UPPER;MID

## 2020-08-24 ASSESSMENT — PAIN DESCRIPTION - LOCATION
LOCATION: ABDOMEN
LOCATION: ABDOMEN;FLANK
LOCATION: ABDOMEN

## 2020-08-24 ASSESSMENT — PAIN DESCRIPTION - ONSET
ONSET: ON-GOING

## 2020-08-24 ASSESSMENT — PAIN DESCRIPTION - PROGRESSION
CLINICAL_PROGRESSION: NOT CHANGED

## 2020-08-24 NOTE — PROGRESS NOTES
Order noted for Dobutamine gtt. MD paged for transfer orders. Clinical notified for C4 bed. Pt aware.

## 2020-08-24 NOTE — PROGRESS NOTES
PROGRESS NOTE  S:54 yrs Patient  admitted on 8/18/2020 with Right upper quadrant pain [R10.11] . Today she complains of Abdominal Pain    Exam:   Vitals:    08/24/20 1536   BP: 102/64   Pulse: 87   Resp: 18   Temp: 97.6 °F (36.4 °C)   SpO2: 98%      General appearance: alert, appears stated age and cooperative  HEENT: Oropharynx clear, no lesions  Neck: no adenopathy and supple, symmetrical, trachea midline  Lungs: clear to auscultation bilaterally  Heart: regular rate and rhythm, S1, S2 normal, no murmur, click, rub or gallop  Abdomen: soft, non-tender; bowel sounds normal; no masses,  no organomegaly  Extremities: 2+ edema     Medications: Reviewed    Labs:  CBC:   Recent Labs     08/22/20  0620 08/23/20  0733 08/24/20  0640   WBC 8.5 7.4 6.9   HGB 9.5* 9.3* 9.2*   HCT 29.9* 29.1* 28.9*   MCV 81.8 81.7 82.9    313 278     BMP:   Recent Labs     08/22/20  0620 08/23/20  0733 08/24/20  0640   * 131* 131*   K 3.3* 3.6 3.6   CL 82* 86* 87*   CO2 31 30 29   PHOS 2.9 2.6 3.3   BUN 51* 52* 49*   CREATININE 1.5* 1.5* 1.8*     LIVER PROFILE:   Recent Labs     08/22/20  0620 08/23/20  0733   AST 15 15   ALT 10 10   PROT 7.3 7.5   BILIDIR 1.5* 1.7*   BILITOT 2.6* 3.1*   ALKPHOS 284* 276*     RUQ US:  Status post cholecystectomy, with normal caliber common duct for the post    cholecystectomy state.         Fatty liver. Impression:62year old female with history of HTN, HLD, COPD, DM, bipolar do, CAD s/p CABG, PVD s/p LUE and RLE bypass, Renal artery stenosis s/p stent c/b subcapsular hematoma s/p coil embolization, CKD, CHF and GERD admitted with CHF exacerbation. The elevated LFTs are likely secondary to passive hepatic congestion vs NAFLD    Recommendation:  1. Continue supportive care  2. Monitor LFTs  3. Aggressive diuresis  4. Carb control, low fat diet  5. [facility] planned for tomorrow  6.  Continue PPI BID      Amina Dhaliwal MD  5:41 PM 8/24/2020

## 2020-08-24 NOTE — PROGRESS NOTES
[address]   Daily Progress Note    Admit Date:  8/18/2020  HPI:  No chief complaint on file. Interval history:   Presented with right sided abdominal pain radiating to right chest, associated with shortness of breath, weight gain and swelling. Troponin and BNP elevated.      Hx of CAD s/p PCI to LAD and LCx 2018, CABG X1 (LIMA to LAD,Jan 2020 for restenosis of LAD and LCx and significant dz in RCA). Hx of ICM, sCHF s/p CardioMEMs implant (2/2019), MR, PAD, CKD, DM, COPD, leg wounds.       She was recently admitted following left renal artery angioplasty and stenting complicated by perinephric hematoma requiring coil embolization of bleeding vessel. Subjective:  Ms. Daryn Sanchez not feeling well today. Voice is hoarse. Continues with edema.   + cough non-productive     Objective:   /78   Pulse 89   Temp 97.4 °F (36.3 °C) (Oral)   Resp 16   Ht 5' 4.5\" (1.638 m)   Wt 252 lb 12.8 oz (114.7 kg)   LMP 10/24/2012   SpO2 96%   BMI 42.72 kg/m²       Intake/Output Summary (Last 24 hours) at 8/24/2020 1241  Last data filed at 8/24/2020 1031  Gross per 24 hour   Intake 1016 ml   Output 1375 ml   Net -359 ml       NYHA: IV  Tele NSR  Physical Exam:  General:  Awake, alert, NAD  Skin:  Warm and dry  Neck:  JVD difficult to assess due to body habitus  Chest:  Clear to auscultation, no wheezes/rhonchi/rales  Cardiovascular:  RRR S1S2, no m/r/g   Abdomen:  Soft, nontender, +bowel sounds, + flank edema  Extremities:  ++ BLE  edema    Medications:    potassium chloride  20 mEq Oral BID WC    metOLazone  5 mg Oral Daily    docusate sodium  100 mg Oral Daily    ARIPiprazole  10 mg Oral Daily    aspirin EC  81 mg Oral Daily    benztropine  1 mg Oral Nightly    buprenorphine-naloxone  1 Film Sublingual BID    clopidogrel  75 mg Oral Daily    ferrous sulfate  325 mg Oral BID WC    lamoTRIgine  200 mg Oral BID    magnesium oxide  400 mg Oral Daily    midodrine  5 mg Oral BID WC    pantoprazole 40 mg Oral BID    spironolactone  50 mg Oral Daily    tiotropium  2 puff Inhalation Daily    insulin lispro  0-12 Units Subcutaneous TID WC    insulin lispro  0-6 Units Subcutaneous Nightly    busPIRone  30 mg Oral BID    insulin glargine  18 Units Subcutaneous Nightly    atorvastatin  80 mg Oral Nightly    sodium chloride flush  10 mL Intravenous 2 times per day      furosemide (LASIX) 1mg/ml infusion 20 mg/hr (08/24/20 0655)    dextrose         Lab Data:  CBC:   Recent Labs     08/22/20  0620 08/23/20  0733 08/24/20  0640   WBC 8.5 7.4 6.9   HGB 9.5* 9.3* 9.2*    313 278     BMP:    Recent Labs     08/22/20  0620 08/23/20  0733 08/24/20  0640   * 131* 131*   K 3.3* 3.6 3.6   CO2 31 30 29   BUN 51* 52* 49*   CREATININE 1.5* 1.5* 1.8*     INR:  No results for input(s): INR in the last 72 hours. BNP:    Recent Labs     08/24/20  0640   PROBNP 4,560*     Lab Results   Component Value Date    LVEF 38 01/24/2020     STRESS MPI 8/22/20:    Summary    Calculated LVEF is not reliable on study which is technically difficult, estimate of 51% is likely an overestimate     Mid-distal anteroseptal/apical akinesis    Large fixed defects in anteroseptal/apical walls    Mild to moderate reversibility in lateral walls consistent with ischemia    Overall, this would be considered an abnormal high risk study      LIMITED ECHO 8/19/20:    Summary   Limited only f/u for LVEF and mitral regurgitation. Technically difficult examination. The left ventricular systolic function is severely reduced with an ejection fraction of 25%. There is hypokinesis of the apex, apical lateral, apical septum, anterioseptum and anterior walls. Compared to previous study from 7-1-2020 no changes noted in left ventricular function. Moderate mitral regurgitation. LIMITED ECHO 6/29/20:    Summary   Technically difficult examination. Limited only f/u for LVEF. and mitral regurgitation.    The left ventricular systolic function is severely reduced with an ejection fraction of 25 %. There is hypokinesis of the apex, apical lateral, apical septum and anteroseptum walls. Moderate mitral regurgitation. Surgery: 1/27/20 Urgent coronary artery bypass grafting surgery x1 with pedicled left internal mammary artery to the LAD. Cardiopulmonary bypass with on-pump beating-heart technique, no cross-clamp. Transesophageal echo. Epiaortic ultrasound. Kilkenny-Jurgen catheter placement. Doppler verification of grafts. Bilateral five-level intercostal nerve block with Exparel. Platelet gel application. Sternal plating. Vas-Cath placement. URI 1/24/20:    Summary   Ejection fraction is visually estimated at 35-40%. Moderate to severe mitral regurgitation with multiple jets visualized. ERO estimated at 0.29 cm2. Mild tricuspid regurgitation. The left atrial appendage shows evidence of thrombus formation and low flow velocities. Moderate amount of plaque in the aorta     CARDIAC CATH 1/20/2020:   Left Heart Cath  Dominance : Right   LM: 70-80% short distal stenosis   LAD: 70-80% short ostial stenosis, extending into takeoff of high first diagonal; large proximal to mid stented segment patent with jailed second diagonal  (80% ostial D2) and 70% in stent restenosis of most distal stent; distal to near apical LAD with minimal disease   LCx: 70-80% short ostial stenosis, prior to patent proximal to mid stents    RCA: large, dominant vessel with long 60% proximal to mid stenosis    LVEDP: 4 mmHG  LVG not done due to CKD. Impression/Recommendations:  Diabetic with previous LAD and LCx stenting in 2018 returns with unstable angina, in stent restenosis and Left Main bifurcation disease. Recommend CTS evaluation inpatient to discuss surgical revascularization option. Hold Clopidogrel. ECHO 4/3/19:    Summary   LV systolic function is mildly reduced with EF estimated at 40-45%.    There is severe HK of the apex and apical-septal segment. Normal left ventricular diastolic filling pressure. Mild mitral regurgitation. Systolic pulmonary artery pressure (SPAP) estimated at 32mmHg (RA pressure 3mmHg). [facility] 3/26/18 [employer] prom[employer] premier [id number] CCx and [id number] CCx. Active Problems:    Acute on chronic combined systolic and diastolic congestive heart failure (HCC)    Right upper quadrant pain    Acute on chronic right-sided heart failure (HCC)  Resolved Problems:    * No resolved hospital problems. *      Assessment/Plan:  · CHF, acute on chronic combined and right heart failure - remains fluid overloaded  · Cardiomyopathy, ischemic - EF 25% by limited echo, unchanged; not on ACEi/ARB/ARNI due to hypotension and CKD  · CAD s/p PCI's and CABG - stress test with lateral reversible ischemia, CABG to LAD, LCx and RCA still with disease, on high dose statin, DAPT  · Hypotension - on midodrine, stable  · HLD   · CKD3  · DM    Plan:   Aspirin and statin  PA diastolic pressure down to 24 today   Continue lasix infusion  Agree with metolazone  Continue Spironolactone (aldactone)  On midodrine 5mg BID  Plans for [facility] when diursed and stable renal function  Unable to start beta blocker due to hypotension  Unable to start ACEi/ARB/ARNI due to hypotension and CKD  May need to consider inotropic support if not able to diurese more today.      Vasile Van CNP, 8/24/2020, 12:41 PM

## 2020-08-24 NOTE — PLAN OF CARE
Problem: Pain:  Goal: Control of acute pain  Description: Control of acute pain  Outcome: Ongoing   Pt has Morphine and tramadol for pain control. Pain tolerable, call light in reach. Problem: HEMODYNAMIC STATUS  Goal: Patient has stable vital signs and fluid balance  Outcome: Ongoing    Patient's EF (Ejection Fraction) is less than 40%    Heart Failure Medications:  Diuretics[de-identified] Furosemide and Spironolactone    (One of the following REQUIRED for EF <40%/SYSTOLIC FAILURE but MAY be used in EF% >40%/DIASTOLIC FAILURE)        ACE[de-identified] None        ARB[de-identified] None         ARNI[de-identified] None    (Beta Blockers)  NON- Evidenced Based Beta Blocker (for EF% >40%/DIASTOLIC FAILURE): None    Evidenced Based Beta Blocker::(REQUIRED for EF% <40%/SYSTOLIC FAILURE) None  . .................................................................................................................................................. Patient's weights and intake/output reviewed: Yes    Patient's Last Weight: 252 lbs obtained by standing scale. Difference of 3 lbs less than last documented weight.       Intake/Output Summary (Last 24 hours) at 8/24/2020 0327  Last data filed at 8/24/2020 0231  Gross per 24 hour   Intake 1075 ml   Output 1750 ml   Net -675 ml       Comorbidities Reviewed Yes    Patient has a past medical history of Acute kidney injury ([address] Acute on chronic congestive heart failure ([address] Alcohol dependence ([facility] [address] Bipolar 1 disorder ([address] CAD (coronary artery disease), Cardiomyopathy ([address] Cellulitis, CHF (congestive heart failure) ([address] COPD (chronic obstructive pulmonary disease) [facility] Diabetic ulcer of left foot associated with type 2 diabetes mellitus ([facility] Diabetic ulcer of toe of right foot associated with type 2 diabetes mellitus ([facility] GERD (gastroesophageal reflux disease), High cholesterol, HTN (hypertension), Kidney disease, chronic, stage II (mild, EGFR 60+ ml/min), MI (myocardial infarction) ([facility] Positive FIT (fecal immunochemical test), Pulmonary nodule, and PVD (peripheral vascular disease) ([facility] [address] >>For CHF and Comorbidity documentation on Education Time and Topics, please see Education Tab    Progressive Mobility Assessment:  What is this patient's Current Level of Mobility?: Ambulatory- with Assistance  How was this patient Mobilized today?: Edge of Bed, Up to Chair, Bedside Commode,  Up to Toilet/Shower, and Up in Room                 With Whom? Nurse, PCA, PT, and OT                 Level of Difficulty/Assistance: 1x Assist     Pt resting in bed at this time on room air. Pt denies shortness of breath. Pt with pitting lower extremity edema.      Patient and/or Family's stated Goal of Care this Admission: reduce shortness of breath, increase activity tolerance, better understand heart failure and disease management, be more comfortable, and reduce lower extremity edema prior to discharge    The patient will demonstrate an understanding of blood sugar control by verbalizing  with the goal of completion at discharge.     :

## 2020-08-24 NOTE — PROGRESS NOTES
artery bypass graft (N/A, 1/27/2020); vascular surgery (Left, 12/08/2015); transluminal angioplasty (Left, 04/29/2020); vascular surgery (Bilateral, 07/07/2020); Coronary angioplasty with stent (10/07/2019); transesophageal echocardiogram (01/26/2020); and Kidney surgery. Restrictions  Restrictions/Precautions  Restrictions/Precautions: General Precautions, Fall Risk  Position Activity Restriction  Other position/activity restrictions: High fall risk per nursing assessment, up with assistance, IV lines, Roche  Subjective   General  Chart Reviewed: Yes  Patient assessed for rehabilitation services?: Yes  Family / Caregiver Present: Yes(spouse)  Referring Practitioner: Zaira Ledezma MD 8/18/2020  Diagnosis: R upper quadrant pain  Subjective  Subjective: Pt pleasant and agreeable to OT evaluation, states in a lot of pain and waiting for pain meds from nurse. General Comment  Comments: RN cleared pt for OT; pt resting in bed, agreeable to therapy  Pain Assessment  Pain Assessment: 0-10  Pain Level: 5  Pain Type: Acute pain  Pain Location: Abdomen  Pain Orientation: Right;Mid  Functional Pain Assessment: Prevents or interferes some active activities and ADLs  Non-Pharmaceutical Pain Intervention(s): Ambulation/Increased Activity;Repositioned; Therapeutic presence  Pre Treatment Pain Screening  Intervention List: Patient able to continue with treatment  Vital Signs  Patient Currently in Pain: Yes   Orientation  Orientation  Overall Orientation Status: Within Functional Limits  Objective    ADL  Feeding: Independent  LE Dressing: Minimal assistance(for slip on shoes)  Toileting: Dependent/Total(roche catheter)        Balance  Sitting Balance: Modified independent   Standing Balance: Stand by assistance(without AD)  Standing Balance  Time: 2 minutes x 1  Activity: brief walk in hallway  Bed mobility  Supine to Sit: Modified independent  Sit to Supine: Unable to assess(left sitting EOB, pt's preference)  Transfers  Sit to stand: Modified independent  Stand to sit: Modified independent      Cognition  Overall Cognitive Status: WFL       Type of ROM/Therapeutic Exercises BUE AROM seated EOB  Hand flex/ext: x  10  Reps  Elbow flex/ext:  x  10  Reps  Forearm sup/pron:  x 10   Reps  Shld flex/ext:  X 10    Reps    LUE AROM : WFL  RUE AROM : WFL     Plan   Plan  Times per week: 3-5x's a week while in acute care    AM-PAC Score        AM-PAC Inpatient Daily Activity Raw Score: 15 (08/24/20 1542)  AM-PAC Inpatient ADL T-Scale Score : 34.69 (08/24/20 1542)  ADL Inpatient CMS 0-100% Score: 56.46 (08/24/20 1542)  ADL Inpatient CMS G-Code Modifier : CK (08/24/20 1542)    Goals  Short term goals  Time Frame for Short term goals: 1 week unless otherwise specified 8/26  Short term goal 1: Pt will complete LE dressing with SBA; 8/24 Not adressed due to roche catheter & Right abdominal pain  Short term goal 2: Pt will complete toielt transfers with SBA by 8/24; 8/24 Progressing with transfers from bed modified independent, presently has roche catheter & denied need for toilet  Patient Goals   Patient goals : \"to be able to go home from here\"       Therapy Time   Individual Concurrent Group Co-treatment   Time In [facility] [address] [place] [address]         Time Out 1350         Minutes [address] [place] [address]

## 2020-08-24 NOTE — PLAN OF CARE
Patient's EF (Ejection Fraction) is less than 40%    Heart Failure Medications:   Diuretics[de-identified] Furosemide, Spironolactone and Other     (One of the following REQUIRED for EF <40%/SYSTOLIC FAILURE but MAY be used in EF% >40%/DIASTOLIC FAILURE)        ACE[de-identified] None        ARB[de-identified] None         ARNI[de-identified] None    (Beta Blockers)   NON- Evidenced Based Beta Blocker (for EF% >40%/DIASTOLIC FAILURE): None     Evidenced Based Beta Blocker::(REQUIRED for EF% <40%/SYSTOLIC FAILURE) None  . .................................................................................................................................................. Patient's weights and intake/output reviewed: Yes    Patient's Last Weight:     Last wt; 252 12.8lbs lbs obtained by standing scale. Prior wt 252.8lb Difference ofmore than last documented weight. Intake/Output Summary (Last 24 hours) at 8/24/2020 1810  Last data filed at 8/24/2020 1531  Gross per 24 hour   Intake 656 ml   Output 2175 ml   Net -1519 ml       Comorbidities Reviewed Yes    Patient has a past medical history of Acute kidney injury ([address] Acute on chronic congestive heart failure ([address] Alcohol dependence ([address] Bipolar 1 disorder ([address] CAD (coronary artery disease), Cardiomyopathy ([address] Cellulitis, CHF (congestive heart failure) ([address] COPD (chronic obstructive pulmonary disease) ([address] Diabetic ulcer of left foot associated with type 2 diabetes mellitus ([address] Diabetic ulcer of toe of right foot associated with type 2 diabetes mellitus ([address] GERD (gastroesophageal reflux disease), High cholesterol, HTN (hypertension), Kidney disease, chronic, stage II (mild, EGFR 60+ ml/min), MI (myocardial infarction) ([address] Positive FIT (fecal immunochemical test), Pulmonary nodule, and PVD (peripheral vascular disease) ([facility] [address]      >>For CHF and Comorbidity documentation on Education Time and Topics, please see Education Tab    Progressive Mobility Assessment:  What is this patient's Current Level of Mobility?: Ambulatory-Up Ad Jasmin  How was this patient Mobilized today?: Edge of Bed, Up to Chair, Bedside Commode,  Up to Toilet/Shower and Up in Room                 With Whom? Nurse, PCA, PT, OT and Self                 Level of Difficulty/Assistance: 1x Assist     Pt resting in bed at this time on room air. Pt denies shortness of breath. Pt with pitting lower extremity edema.      Patient and/or Family's stated Goal of Care this Admission: increase activity tolerance, better understand heart failure and disease management, be more comfortable and reduce lower extremity edema prior to discharge        :

## 2020-08-24 NOTE — PROGRESS NOTES
Nephrology Progress Note   [email or web address]      This patient is a 62year old female whom we are following for SHAUNNA on CKD. Subjective: The patient was seen and examined. Still complaining of LE edema and SOB. Weight still up at 252 lbs despite Lasix drip and IV albumin. Family History: No family at bedside  ROS: No fever or chills      Vitals:  /83   Pulse 92   Temp 97.3 °F (36.3 °C) (Oral)   Resp 17   Ht 5' 4.5\" (1.638 m)   Wt 252 lb 12.8 oz (114.7 kg)   LMP 10/24/2012   SpO2 97%   BMI 42.72 kg/m²   I/O last 3 completed shifts: In: 0296 [P.O.:600; I.V.:356; IV Piggyback:200]  Out: 1625 [Urine:1625]  No intake/output data recorded. Physical Exam:  Physical Exam  Vitals signs reviewed. Constitutional:       General: She is not in acute distress. Appearance: Normal appearance. HENT:      Head: Normocephalic and atraumatic. Mouth/Throat:      Mouth: Mucous membranes are moist.   Eyes:      General: No scleral icterus. Conjunctiva/sclera: Conjunctivae normal.   Cardiovascular:      Rate and Rhythm: Normal rate. Heart sounds: No friction rub. Pulmonary:      Effort: Pulmonary effort is normal.      Comments: Diminished breath sounds bilateral  Abdominal:      Tenderness: There is no abdominal tenderness. Musculoskeletal:      Right lower leg: Edema present. Left lower leg: Edema present. Neurological:      Mental Status: She is alert.            Medications:   docusate sodium  100 mg Oral Daily    ARIPiprazole  10 mg Oral Daily    aspirin EC  81 mg Oral Daily    benztropine  1 mg Oral Nightly    buprenorphine-naloxone  1 Film Sublingual BID    clopidogrel  75 mg Oral Daily    ferrous sulfate  325 mg Oral BID WC    lamoTRIgine  200 mg Oral BID    magnesium oxide  400 mg Oral Daily    midodrine  5 mg Oral BID WC    pantoprazole  40 mg Oral BID    potassium chloride  20 mEq Oral Daily    spironolactone  50 mg Oral Daily    tiotropium  2 puff Inhalation Daily    insulin lispro  0-12 Units Subcutaneous TID     insulin lispro  0-6 Units Subcutaneous Nightly    busPIRone  30 mg Oral BID    insulin glargine  18 Units Subcutaneous Nightly    atorvastatin  80 mg Oral Nightly    sodium chloride flush  10 mL Intravenous 2 times per day         Labs:  Recent Labs     08/22/20  0620 08/23/20  0733 08/24/20  0640   WBC 8.5 7.4 6.9   HGB 9.5* 9.3* 9.2*   HCT 29.9* 29.1* 28.9*   MCV 81.8 81.7 82.9    313 278     Recent Labs     08/22/20  0620 08/23/20  0733 08/24/20  0640   * 131* 131*   K 3.3* 3.6 3.6   CL 82* 86* 87*   CO2 31 30 29   GLUCOSE 144* 143* 134*   PHOS 2.9 2.6 3.3   MG  --  2.30  --    BUN 51* 52* 49*   CREATININE 1.5* 1.5* 1.8*   LABGLOM 36* 36* 29*   GFRAA 43* 43* 35*           Assessment/Plan:    SHAUNNA/CKD stage 3  - Etiology: ATN (hypotension; contrast dye) from last admission  - Data: SCr peaked at 2.0, improved to 1.2mg/dL but now slowly trending up in the setting of fluid overload. - Continue lasix drip at 20 mg/hour & aldactone for diuresis. - Will add Metolazone 5mg daily.  - Renal function panel daily.     CHF - acute on chronic  - Diuresis as above  - 2 liter/day fluid restriction  - Strict I's/O's, daily weights   - CardioMEMS monitoring per Cardiology     Recent Perinephric hematoma. - Complication of L renal artery angioplasty / stent placement  - S/P coil embolization of subsegmental L renal artery  - Stable in size based on CT this admission     Renovascular HTN.  - S/P L renal artery angioplasty / stent placement  - BP satisfactory. - On scheduled midodrine 5 mg po bid  - Follow for late recurrence of HTN (post-perinephric bleed) (I.e, Page kidney).     Anemia - blood loss. - Hgb stabilized in 8-9 range now  - S/P transfusion 2 units PRBC's last admission, oral Fe added.     Hyponatremia  - Has been present consistently since 4/20   - ?  Etiology - likely multifactorial with pain, volume excess / CHF       - CHF

## 2020-08-24 NOTE — PROGRESS NOTES
Hospitalist Progress Note      PCP: Radha Frye PA-C    Date of Admission: 8/18/2020    Chief Complaint: RUQ pain    Hospital Course: The patient is a pleasant, chronically ill-appearing 62 Y F with a h/o COPD, HTN, HLD, DM2, CAD s/p stents and CABG, ischemic cardiomyopathy, and CHF.  She has extensive vascular disease and is s/p a LUE bypass, a LLE angioplasty (then again when it reoccluded), and a RLE bypass.  She continues to smoke.  She was recently diagnosed with L renal artery stenosis and underwent a L renal artery angioplasty and stent here on 8/4.  She was discharged home that day but returned to the ED a few hours later with L flank pain and was admitted with a L perinephric subcapsular hematoma.  She required 2u pRBCs and a coil embolization of a L renal artery branch on 8/5. Mana Melendez hospitalization was complicated by SHAUNNA and hyponatremia, and with the help of nephrology each of these issues improved a little by the time she was discharged on 8/12.               Now the patient presents with new sharp RUQ pain which radiates to her R posterior flank, R chest, and R shoulder.  It started around 8pm last night, and she denies having any pain in this area previously.  It hurts her badly to breath or move.  She is very tender in the RUQ.  She is s/p cholecystectomy.  Her LFTs have been newly abnormal for the last couple weeks. Subjective: Still with RUQ pain, although it has improved. Remains on Lasix gtt. Net negative 8L. Nephrology and cardiology following.       Medications:  Reviewed    Infusion Medications    furosemide (LASIX) 1mg/ml infusion 20 mg/hr (08/24/20 1315)    dextrose       Scheduled Medications    potassium chloride  20 mEq Oral BID WC    metOLazone  5 mg Oral Daily    docusate sodium  100 mg Oral Daily    ARIPiprazole  10 mg Oral Daily    aspirin EC  81 mg Oral Daily    benztropine  1 mg Oral Nightly    buprenorphine-naloxone  1 Film Sublingual BID    clopidogrel  75 mg Oral Daily    ferrous sulfate  325 mg Oral BID     lamoTRIgine  200 mg Oral BID    magnesium oxide  400 mg Oral Daily    midodrine  5 mg Oral BID     pantoprazole  40 mg Oral BID    spironolactone  50 mg Oral Daily    tiotropium  2 puff Inhalation Daily    insulin lispro  0-12 Units Subcutaneous TID     insulin lispro  0-6 Units Subcutaneous Nightly    busPIRone  30 mg Oral BID    insulin glargine  18 Units Subcutaneous Nightly    atorvastatin  80 mg Oral Nightly    sodium chloride flush  10 mL Intravenous 2 times per day     PRN Meds: morphine, ondansetron, magnesium hydroxide, perflutren lipid microspheres, traMADol, traZODone, glucose, dextrose, glucagon (rDNA), dextrose, sodium chloride flush, acetaminophen **OR** acetaminophen, polyethylene glycol, albuterol      Intake/Output Summary (Last 24 hours) at 8/24/2020 1339  Last data filed at 8/24/2020 1318  Gross per 24 hour   Intake 1016 ml   Output 1375 ml   Net -359 ml       Physical Exam Performed:    /78   Pulse 89   Temp 97.4 °F (36.3 °C) (Oral)   Resp 16   Ht 5' 4.5\" (1.638 m)   Wt 252 lb 12.8 oz (114.7 kg)   LMP 10/24/2012   SpO2 96%   BMI 42.72 kg/m²     General appearance: No apparent distress, appears stated age and cooperative. HEENT: Pupils equal, round, and reactive to light. Conjunctivae/corneas clear. Neck: Supple, with full range of motion. No jugular venous distention. Trachea midline. Respiratory:  Normal respiratory effort. Clear to auscultation, bilaterally without Rales/Wheezes/Rhonchi. Cardiovascular: Regular rate and rhythm with normal S1/S2 without murmurs, rubs or gallops. Abdomen: Soft, non-tender, non-distended with normal bowel sounds. Musculoskeletal: No clubbing, cyanosis bilaterally. Full range of motion without deformity. 1+ BLE edema. Skin: Skin color, texture, turgor normal.  No rashes or lesions. Neurologic:  Neurovascularly intact without any focal sensory/motor deficits.  Cranial nerves: II-XII intact, grossly non-focal.  Psychiatric: Alert and oriented, thought content appropriate, normal insight  Capillary Refill: Brisk,< 3 seconds   Peripheral Pulses: +2 palpable, equal bilaterally       Labs:   Recent Labs     08/22/20  0620 08/23/20  0733 08/24/20  0640   WBC 8.5 7.4 6.9   HGB 9.5* 9.3* 9.2*   HCT 29.9* 29.1* 28.9*    313 278     Recent Labs     08/22/20  0620 08/23/20  0733 08/24/20  0640   * 131* 131*   K 3.3* 3.6 3.6   CL 82* 86* 87*   CO2 31 30 29   BUN 51* 52* 49*   CREATININE 1.5* 1.5* 1.8*   CALCIUM 9.0 9.4 9.6   PHOS 2.9 2.6 3.3     Recent Labs     08/22/20  0620 08/23/20  0733   AST 15 15   ALT 10 10   BILIDIR 1.5* 1.7*   BILITOT 2.6* 3.1*   ALKPHOS 284* 276*     No results for input(s): INR in the last 72 hours. No results for input(s): Thelda Rough in the last 72 hours. Urinalysis:      Lab Results   Component Value Date    NITRU Negative 08/18/2020    WBCUA 0-2 08/18/2020    BACTERIA Rare 08/18/2020    RBCUA 0-2 08/18/2020    BLOODU Negative 08/18/2020    SPECGRAV 1.010 08/18/2020    GLUCOSEU Negative 08/18/2020       Radiology:  US GALLBLADDER RUQ   Final Result   Status post cholecystectomy, with normal caliber common duct for the post   cholecystectomy state. Fatty liver. NM Cardiac Stress Test Nuclear Imaging   Final Result      NM LUNG SCAN PERFUSION ONLY   Final Result   Low probability for pulmonary embolism.                  Assessment/Plan:    Active Hospital Problems    Diagnosis    Acute on chronic right-sided heart failure (HCC) [I50.813]    Right upper quadrant pain [R10.11]    Acute on chronic combined systolic and diastolic congestive heart failure (HCC) [I50.43]     RUQ pain, suspect due to congestive hepatopathy, due to acute on chronic systolic CHF  - she had been on spironolactone and PRN metolazone.  Then last admission she was discharged with spironolactone and daily torsemide.  She remains of lasix gtt here, currently at 20mg/hour.  Her RUQ pain has persisted. Her bili/Alk phos are elevated. - GI consulted and following.    - RUQ US unremarkable. - Continue to trend LFTs  - has a history of alcoholism - sober since 2006 and on Suboxone. - limit narcotics if possible.     Acute on chronic combined systolic and diastolic heart failure with fluid overload, LVEF of 25%  - not on ACE inhibitor or ARB due to CKD. - has implanted CardioMems unit   - no ACE/ARB/ARNI due to unstable renal status.    - cardiology and nephrology consulted and following.  - daily weights, I&O.    CKD3  - baseline Cr is probably about 1.5   Monitor during diuresis.    - nephrology consulted and following.     Hyponatremia  - it looks like her baseline is in the high 120's, even when accounting for hyperglycemia.  After a week of aggressive management, including multiple doses of tolvaptan, she still was only discharged at 126 last time.    - Continuing IV Lasix, nephrology input noted.      CAD  - aspirin, clopidogrel.  OK to resume high dose statin for now. Monitor for worsening LFTs. - stress test was abnormal, so will need LHC at some point during her stay.       DM2  - adjusted insulin regimen while here.  Recent A1c 7.2.     Anemia - due to blood loss. - 14.7 cm L perinephric subcapsular hematoma was stable on repeat CT on this admission.   - continued on iron. Hypokalemia - on BID replacement. - follow BMP. Morbid obesity - body mass index is 42.72 kg/m². Complicating assessment and treatment. Placing patient at risk for multiple co-morbidities as well as early death and contributing to the patient's presentation. Counseled on weight loss    DVT Prophylaxis: SCDs  Diet: DIET CARB CONTROL; No Added Salt (3-4 GM);  Daily Fluid Restriction: 2000 ml  Code Status: Full Code    PT/OT Eval Status: not indicated    Dispo - likely another 2-3 days inpatient    TRAY Dacosta - CNP

## 2020-08-24 NOTE — PROGRESS NOTES
Physical Therapy  Facility/Department: [place] [id number] - MED SURG  Daily Treatment Note  NAME: Evy Gauthier  : 1965  MRN: [id number]    Date of Service: 2020    Discharge Recommendations:  Home with assist PRN, Home with Home health PT   PT Equipment Recommendations  Equipment Needed: Yes    Assessment   Body structures, Functions, Activity limitations: Decreased functional mobility ; Decreased strength;Decreased balance; Increased pain  Assessment: Pt seen for supine bed exercises only today. Pt declines OOB activity due to fatigue and abdominal pain. Pt expresses desire to get stronger so she can climb steps into her trailer upon d/c, and therapist encourages pt to participate further with PT next visit. Pt will benefit from continued skilled PT in acute setting to advance mobility as tolerated. Decision Making: Low Complexity  PT Education: Goals;PT Role;Plan of Care;Disease Specific Education  Patient Education: Pt verbalizes understanding  REQUIRES PT FOLLOW UP: Yes  Activity Tolerance  Activity Tolerance: Patient limited by fatigue;Patient limited by pain     Patient Diagnosis(es): There were no encounter diagnoses. has a past medical history of Acute kidney injury ([address] Acute on chronic congestive heart failure ([address] Alcohol dependence ([address] Bipolar 1 disorder ([address] CAD (coronary artery disease), Cardiomyopathy ([address] Cellulitis, CHF (congestive heart failure) ([address] COPD (chronic obstructive pulmonary disease) ([address] Diabetic ulcer of left foot associated with type 2 diabetes mellitus ([address] Diabetic ulcer of toe of right foot associated with type 2 diabetes mellitus ([address] GERD (gastroesophageal reflux disease), High cholesterol, HTN (hypertension), Kidney disease, chronic, stage II (mild, EGFR 60+ ml/min), MI (myocardial infarction) ([address] Positive FIT (fecal immunochemical test), Pulmonary nodule, and PVD (peripheral vascular disease) ([address]    has a past surgical history that includes Tonsillectomy; Cholecystectomy; tumor excision; Upper gastrointestinal endoscopy (4/25/2013); Upper gastrointestinal endoscopy (12/10/2015); Upper gastrointestinal endoscopy (01/06/2017); Arterial bypass surgry (Left, 01/06/2016); Cardiac catheterization (03/27/2018); Coronary angioplasty with stent (03/16/2018); Rotator cuff repair (Left, 04/22/2016); Coronary angioplasty with stent (01/03/2018); Insertable Cardiac Monitor (02/14/2019); femoral bypass (Right, 5/16/2019); transluminal angioplasty (Left, 10/08/2019); Cardiac catheterization (01/20/2020); Coronary artery bypass graft (N/A, 1/27/2020); vascular surgery (Left, 12/08/2015); transluminal angioplasty (Left, 04/29/2020); vascular surgery (Bilateral, 07/07/2020); Coronary angioplasty with stent (10/07/2019); transesophageal echocardiogram (01/26/2020); and Kidney surgery. Restrictions  Restrictions/Precautions  Restrictions/Precautions: General Precautions, Fall Risk  Position Activity Restriction  Other position/activity restrictions: High fall risk per nursing assessment, up with assistance, Nabeel     Subjective   General  Chart Reviewed: Yes  Response To Previous Treatment: Patient reporting fatigue but able to participate. Family / Caregiver Present: Yes  Subjective  Subjective: Pt initially declines therapy stating she is tired and has already walked with therapy today. Therapist comes back later in afternoon and pt is resting in bed, agreeable to bed exercises.   General Comment  Comments: RN cleared pt for session  Pain Screening  Patient Currently in Pain: Yes  Pain Assessment  Pain Level: 8  Pain Type: Acute pain  Pain Location: Abdomen  Pain Orientation: Right  Pain Frequency: Continuous  Functional Pain Assessment: Prevents or interferes some active activities and ADLs  Vital Signs  Patient Currently in Pain: Yes       Orientation  Orientation  Overall Orientation Status: Within Functional Limits       Objective   Bed mobility  Supine to Sit: Unable to assess  Sit to Supine: Unable to assess  Transfers  Sit to Stand: Unable to assess  Stand to sit: Unable to assess  Ambulation  Ambulation?: No(Pt declines)        Exercises  Heelslides: BLE x 20  Hip Abduction: BLE x 20 in supine  Knee Short Arc Quad: BLE x 20  Ankle Pumps: BLE x 20   Additional exercises attempted, pt reporting increased abd pain. Goals  Short term goals  Time Frame for Short term goals: 1 week, 8/26/20 (unless otherwise specified)  Short term goal 1: Pt will transfer supine <-> sit with modified(I) -- 8/21: Supine to sit SBA-S  Short term goal 2: Pt will transfer sit <-> stand and bed>chair with modified(I) -- 8/21: S without AD  Short term goal 3: Pt will ambulate x 200 feet without AD with supervision/modified(I) -- 8/21: x 100' no AD SBA  Short term goal 4: By 8/21/20: Pt will tolerate 12-15 reps BLE exercise for strengthening, balance, and endurance -- 8/20: GOAL MET: x 15 reps seated BLE exercises  Short term goal 5: Pt will ambulate up/down 4 steps using B handrails with SBA -- 8/20: DNT  Patient Goals   Patient goals : \"To walk better and go home\"    Plan    Plan  Times per week: 3-5x/week in acute care  Times per day: Daily  Specific instructions for Next Treatment: Progress ther ex and mobility as tolerated, assess stair amb prior to D/C home  Current Treatment Recommendations: Strengthening, Balance Training, Functional Mobility Training, Transfer Training, Gait Training, Home Exercise Program, Safety Education & Training, Pain Management, Stair training  Safety Devices  Type of devices:  All fall risk precautions in place, Call light within reach, Gait belt, Patient at risk for falls, Left in bed     Therapy Time   Individual Concurrent Group Co-treatment   Time In 1550         Time Out 1608         Minutes 18         Timed Code Treatment Minutes: 10[facility]       Julee Acevedo, PT, DPT

## 2020-08-25 LAB
ALBUMIN SERPL-MCNC: 4 G/DL (ref 3.4–5)
ANION GAP SERPL CALCULATED.3IONS-SCNC: 17 MMOL/L (ref 3–16)
BASOPHILS ABSOLUTE: 0.1 K/UL (ref 0–0.2)
BASOPHILS RELATIVE PERCENT: 0.8 %
BUN BLDV-MCNC: 50 MG/DL (ref 7–20)
CALCIUM SERPL-MCNC: 9.7 MG/DL (ref 8.3–10.6)
CHLORIDE BLD-SCNC: 86 MMOL/L (ref 99–110)
CO2: 28 MMOL/L (ref 21–32)
CREAT SERPL-MCNC: 1.8 MG/DL (ref 0.6–1.1)
EOSINOPHILS ABSOLUTE: 0.1 K/UL (ref 0–0.6)
EOSINOPHILS RELATIVE PERCENT: 0.9 %
GFR AFRICAN AMERICAN: 35
GFR NON-AFRICAN AMERICAN: 29
GLUCOSE BLD-MCNC: 148 MG/DL (ref 70–99)
GLUCOSE BLD-MCNC: 150 MG/DL (ref 70–99)
GLUCOSE BLD-MCNC: 161 MG/DL (ref 70–99)
GLUCOSE BLD-MCNC: 175 MG/DL (ref 70–99)
GLUCOSE BLD-MCNC: 182 MG/DL (ref 70–99)
HAV IGM SER IA-ACNC: ABNORMAL
HCT VFR BLD CALC: 30.5 % (ref 36–48)
HEMOGLOBIN: 9.7 G/DL (ref 12–16)
HEPATITIS B CORE IGM ANTIBODY: ABNORMAL
HEPATITIS B SURFACE ANTIGEN INTERPRETATION: ABNORMAL
HEPATITIS C ANTIBODY INTERPRETATION: REACTIVE
LYMPHOCYTES ABSOLUTE: 0.6 K/UL (ref 1–5.1)
LYMPHOCYTES RELATIVE PERCENT: 7.8 %
MCH RBC QN AUTO: 26.3 PG (ref 26–34)
MCHC RBC AUTO-ENTMCNC: 31.8 G/DL (ref 31–36)
MCV RBC AUTO: 82.8 FL (ref 80–100)
MONOCYTES ABSOLUTE: 0.5 K/UL (ref 0–1.3)
MONOCYTES RELATIVE PERCENT: 6.3 %
NEUTROPHILS ABSOLUTE: 6.7 K/UL (ref 1.7–7.7)
NEUTROPHILS RELATIVE PERCENT: 84.2 %
PDW BLD-RTO: 25.3 % (ref 12.4–15.4)
PERFORMED ON: ABNORMAL
PHOSPHORUS: 3.3 MG/DL (ref 2.5–4.9)
PLATELET # BLD: 274 K/UL (ref 135–450)
PMV BLD AUTO: 8.5 FL (ref 5–10.5)
POTASSIUM SERPL-SCNC: 3.4 MMOL/L (ref 3.5–5.1)
RBC # BLD: 3.68 M/UL (ref 4–5.2)
SODIUM BLD-SCNC: 131 MMOL/L (ref 136–145)
WBC # BLD: 7.9 K/UL (ref 4–11)

## 2020-08-25 PROCEDURE — 2580000003 HC RX 258: Performed by: INTERNAL MEDICINE

## 2020-08-25 PROCEDURE — 6360000002 HC RX W HCPCS: Performed by: INTERNAL MEDICINE

## 2020-08-25 PROCEDURE — 6370000000 HC RX 637 (ALT 250 FOR IP): Performed by: INTERNAL MEDICINE

## 2020-08-25 PROCEDURE — 85025 COMPLETE CBC W/AUTO DIFF WBC: CPT

## 2020-08-25 PROCEDURE — 2060000000 HC ICU INTERMEDIATE R&B

## 2020-08-25 PROCEDURE — 80069 RENAL FUNCTION PANEL: CPT

## 2020-08-25 PROCEDURE — 94640 AIRWAY INHALATION TREATMENT: CPT

## 2020-08-25 PROCEDURE — 97116 GAIT TRAINING THERAPY: CPT

## 2020-08-25 PROCEDURE — 6360000002 HC RX W HCPCS: Performed by: NURSE PRACTITIONER

## 2020-08-25 PROCEDURE — 99233 SBSQ HOSP IP/OBS HIGH 50: CPT | Performed by: NURSE PRACTITIONER

## 2020-08-25 PROCEDURE — 97530 THERAPEUTIC ACTIVITIES: CPT

## 2020-08-25 PROCEDURE — 97110 THERAPEUTIC EXERCISES: CPT

## 2020-08-25 RX ORDER — POTASSIUM CHLORIDE 20 MEQ/1
40 TABLET, EXTENDED RELEASE ORAL 2 TIMES DAILY WITH MEALS
Status: DISCONTINUED | OUTPATIENT
Start: 2020-08-25 | End: 2020-08-26

## 2020-08-25 RX ORDER — POTASSIUM CHLORIDE 20 MEQ/1
20 TABLET, EXTENDED RELEASE ORAL ONCE
Status: COMPLETED | OUTPATIENT
Start: 2020-08-25 | End: 2020-08-25

## 2020-08-25 RX ADMIN — INSULIN LISPRO 2 UNITS: 100 INJECTION, SOLUTION INTRAVENOUS; SUBCUTANEOUS at 11:34

## 2020-08-25 RX ADMIN — ASPIRIN 81 MG: 81 TABLET, COATED ORAL at 09:00

## 2020-08-25 RX ADMIN — BUSPIRONE HYDROCHLORIDE 30 MG: 5 TABLET ORAL at 00:09

## 2020-08-25 RX ADMIN — FUROSEMIDE 20 MG/HR: 10 INJECTION, SOLUTION INTRAMUSCULAR; INTRAVENOUS at 06:57

## 2020-08-25 RX ADMIN — ATORVASTATIN CALCIUM 80 MG: 80 TABLET, FILM COATED ORAL at 20:20

## 2020-08-25 RX ADMIN — TRAZODONE HYDROCHLORIDE 100 MG: 50 TABLET ORAL at 00:14

## 2020-08-25 RX ADMIN — MIDODRINE HYDROCHLORIDE 5 MG: 5 TABLET ORAL at 09:00

## 2020-08-25 RX ADMIN — TRAMADOL HYDROCHLORIDE 50 MG: 50 TABLET, FILM COATED ORAL at 00:14

## 2020-08-25 RX ADMIN — MORPHINE SULFATE 2 MG: 2 INJECTION, SOLUTION INTRAMUSCULAR; INTRAVENOUS at 22:52

## 2020-08-25 RX ADMIN — PANTOPRAZOLE SODIUM 40 MG: 40 TABLET, DELAYED RELEASE ORAL at 08:59

## 2020-08-25 RX ADMIN — TIOTROPIUM BROMIDE INHALATION SPRAY 2 PUFF: 3.12 SPRAY, METERED RESPIRATORY (INHALATION) at 08:18

## 2020-08-25 RX ADMIN — BENZTROPINE MESYLATE 1 MG: 1 TABLET ORAL at 20:19

## 2020-08-25 RX ADMIN — DOCUSATE SODIUM 100 MG: 100 CAPSULE, LIQUID FILLED ORAL at 09:00

## 2020-08-25 RX ADMIN — FUROSEMIDE 20 MG/HR: 10 INJECTION, SOLUTION INTRAMUSCULAR; INTRAVENOUS at 18:21

## 2020-08-25 RX ADMIN — INSULIN LISPRO 2 UNITS: 100 INJECTION, SOLUTION INTRAVENOUS; SUBCUTANEOUS at 17:20

## 2020-08-25 RX ADMIN — MAGNESIUM OXIDE TAB 400 MG (241.3 MG ELEMENTAL MG) 400 MG: 400 (241.3 MG) TAB at 08:59

## 2020-08-25 RX ADMIN — LAMOTRIGINE 200 MG: 100 TABLET ORAL at 08:59

## 2020-08-25 RX ADMIN — MORPHINE SULFATE 2 MG: 2 INJECTION, SOLUTION INTRAMUSCULAR; INTRAVENOUS at 06:06

## 2020-08-25 RX ADMIN — MIDODRINE HYDROCHLORIDE 5 MG: 5 TABLET ORAL at 17:19

## 2020-08-25 RX ADMIN — FUROSEMIDE 20 MG/HR: 10 INJECTION, SOLUTION INTRAMUSCULAR; INTRAVENOUS at 12:04

## 2020-08-25 RX ADMIN — CLOPIDOGREL BISULFATE 75 MG: 75 TABLET ORAL at 09:00

## 2020-08-25 RX ADMIN — FERROUS SULFATE TAB 325 MG (65 MG ELEMENTAL FE) 325 MG: 325 (65 FE) TAB at 17:20

## 2020-08-25 RX ADMIN — LAMOTRIGINE 200 MG: 100 TABLET ORAL at 20:19

## 2020-08-25 RX ADMIN — INSULIN GLARGINE 18 UNITS: 100 INJECTION, SOLUTION SUBCUTANEOUS at 20:20

## 2020-08-25 RX ADMIN — FUROSEMIDE 20 MG/HR: 10 INJECTION, SOLUTION INTRAMUSCULAR; INTRAVENOUS at 01:54

## 2020-08-25 RX ADMIN — DOBUTAMINE HYDROCHLORIDE 2.5 MCG/KG/MIN: 200 INJECTION INTRAVENOUS at 18:21

## 2020-08-25 RX ADMIN — BUSPIRONE HYDROCHLORIDE 30 MG: 5 TABLET ORAL at 08:58

## 2020-08-25 RX ADMIN — TRAMADOL HYDROCHLORIDE 50 MG: 50 TABLET, FILM COATED ORAL at 14:33

## 2020-08-25 RX ADMIN — POTASSIUM CHLORIDE 40 MEQ: 20 TABLET, EXTENDED RELEASE ORAL at 17:19

## 2020-08-25 RX ADMIN — SPIRONOLACTONE 50 MG: 25 TABLET ORAL at 08:59

## 2020-08-25 RX ADMIN — BUSPIRONE HYDROCHLORIDE 30 MG: 5 TABLET ORAL at 20:20

## 2020-08-25 RX ADMIN — POTASSIUM CHLORIDE 20 MEQ: 20 TABLET, EXTENDED RELEASE ORAL at 08:59

## 2020-08-25 RX ADMIN — FERROUS SULFATE TAB 325 MG (65 MG ELEMENTAL FE) 325 MG: 325 (65 FE) TAB at 08:59

## 2020-08-25 RX ADMIN — ARIPIPRAZOLE 10 MG: 10 TABLET ORAL at 08:58

## 2020-08-25 RX ADMIN — INSULIN LISPRO 1 UNITS: 100 INJECTION, SOLUTION INTRAVENOUS; SUBCUTANEOUS at 20:20

## 2020-08-25 RX ADMIN — MORPHINE SULFATE 2 MG: 2 INJECTION, SOLUTION INTRAMUSCULAR; INTRAVENOUS at 17:19

## 2020-08-25 RX ADMIN — PANTOPRAZOLE SODIUM 40 MG: 40 TABLET, DELAYED RELEASE ORAL at 17:19

## 2020-08-25 RX ADMIN — FUROSEMIDE 20 MG/HR: 10 INJECTION, SOLUTION INTRAMUSCULAR; INTRAVENOUS at 23:27

## 2020-08-25 RX ADMIN — Medication 10 ML: at 09:00

## 2020-08-25 RX ADMIN — MORPHINE SULFATE 2 MG: 2 INJECTION, SOLUTION INTRAMUSCULAR; INTRAVENOUS at 11:38

## 2020-08-25 ASSESSMENT — PAIN SCALES - GENERAL
PAINLEVEL_OUTOF10: 6
PAINLEVEL_OUTOF10: 8
PAINLEVEL_OUTOF10: 4
PAINLEVEL_OUTOF10: 6
PAINLEVEL_OUTOF10: 5
PAINLEVEL_OUTOF10: 7

## 2020-08-25 ASSESSMENT — PAIN DESCRIPTION - PAIN TYPE
TYPE: ACUTE PAIN

## 2020-08-25 ASSESSMENT — PAIN DESCRIPTION - ORIENTATION
ORIENTATION: RIGHT;UPPER
ORIENTATION: RIGHT;UPPER

## 2020-08-25 ASSESSMENT — PAIN DESCRIPTION - LOCATION
LOCATION: ABDOMEN

## 2020-08-25 ASSESSMENT — PAIN - FUNCTIONAL ASSESSMENT: PAIN_FUNCTIONAL_ASSESSMENT: PREVENTS OR INTERFERES SOME ACTIVE ACTIVITIES AND ADLS

## 2020-08-25 NOTE — PLAN OF CARE
Problem: Pain:  Goal: Pain level will decrease  Description: Pain level will decrease  Outcome: Ongoing  Goal: Control of acute pain  Description: Control of acute pain  Outcome: Ongoing     Problem: Skin Integrity:  Goal: Will show no infection signs and symptoms  Description: Will show no infection signs and symptoms  Outcome: Ongoing  Goal: Absence of new skin breakdown  Description: Absence of new skin breakdown  Outcome: Ongoing     Problem: Falls - Risk of:  Goal: Will remain free from falls  Description: Will remain free from falls  Outcome: Ongoing     Problem: OXYGENATION/RESPIRATORY FUNCTION  Goal: Patient will maintain patent airway  Outcome: Ongoing  Goal: Patient will achieve/maintain normal respiratory rate/effort  Description: Respiratory rate and effort will be within normal limits for the patient  Outcome: Ongoing     Problem: HEMODYNAMIC STATUS  Goal: Patient has stable vital signs and fluid balance  Outcome: Ongoing     Problem: FLUID AND ELECTROLYTE IMBALANCE  Goal: Fluid and electrolyte balance are achieved/maintained  Outcome: Ongoing     Problem: ACTIVITY INTOLERANCE/IMPAIRED MOBILITY  Goal: Mobility/activity is maintained at optimum level for patient  Outcome: Ongoing

## 2020-08-25 NOTE — PROGRESS NOTES
with Assistance  How was this patient Mobilized today?: Edge of Bed, Up to Chair, Bedside Commode,  Up to Toilet/Shower, Up in Room and Up in Hallway                 With Whom? Nurse, PCA, PT and OT                 Level of Difficulty/Assistance: 1x Assist     Pt resting in bed at this time on room air. Pt denies shortness of breath. Pt with pitting lower extremity edema.      Patient and/or Family's stated Goal of Care this Admission: reduce shortness of breath, increase activity tolerance, better understand heart failure and disease management, be more comfortable and reduce lower extremity edema prior to discharge        :

## 2020-08-25 NOTE — PROGRESS NOTES
[facility]   Daily Progress Note    Admit Date:  8/18/2020  HPI:  No chief complaint on file. Interval history:   Presented with right sided abdominal pain radiating to right chest, associated with shortness of breath, weight gain and swelling. Troponin and BNP elevated.      Hx of CAD s/p PCI to LAD and LCx 2018, CABG X1 (LIMA to LAD,Jan 2020 for restenosis of LAD and LCx and significant dz in RCA). Hx of ICM, sCHF s/p CardioMEMs implant (2/2019), MR, PAD, CKD, DM, COPD, leg wounds.       She was recently admitted following left renal artery angioplasty and stenting complicated by perinephric hematoma requiring coil embolization of bleeding vessel. Subjective:  Ms. Porter Rink a little better today, weight is down. Started on dobutamine last evening. UOP increased. Continues with anasarca of the abd.      Objective:   /76   Pulse 88   Temp 97.9 °F (36.6 °C) (Oral)   Resp 18   Ht 5' 4.5\" (1.638 m)   Wt 249 lb (112.9 kg)   LMP 10/24/2012   SpO2 99%   BMI 42.08 kg/m²       Intake/Output Summary (Last 24 hours) at 8/25/2020 1059  Last data filed at 8/25/2020 1053  Gross per 24 hour   Intake 854.42 ml   Output 4950 ml   Net -4095.58 ml       NYHA: IV  Tele NSR  Physical Exam:  General:  Awake, alert, NAD  Skin:  Warm and dry  Neck:  JVD difficult to assess due to body habitus  Chest:  Dim to auscultation, no wheezes/rhonchi/rales  Cardiovascular:  RRR S1S2, no m/r/g   Abdomen:  Soft, nontender, +bowel sounds, + flank edema  Extremities:  ++ BLE  edema    Medications:    potassium chloride  40 mEq Oral BID WC    metOLazone  5 mg Oral BID    docusate sodium  100 mg Oral Daily    ARIPiprazole  10 mg Oral Daily    aspirin EC  81 mg Oral Daily    benztropine  1 mg Oral Nightly    buprenorphine-naloxone  1 Film Sublingual BID    clopidogrel  75 mg Oral Daily    ferrous sulfate  325 mg Oral BID WC    lamoTRIgine  200 mg Oral BID    magnesium oxide  400 mg Oral Daily    midodrine  5 mg Oral BID     pantoprazole  40 mg Oral BID    spironolactone  50 mg Oral Daily    tiotropium  2 puff Inhalation Daily    insulin lispro  0-12 Units Subcutaneous TID WC    insulin lispro  0-6 Units Subcutaneous Nightly    busPIRone  30 mg Oral BID    insulin glargine  18 Units Subcutaneous Nightly    atorvastatin  80 mg Oral Nightly    sodium chloride flush  10 mL Intravenous 2 times per day      DOBUTamine 2.5 mcg/kg/min (08/24/20 2010)    furosemide (LASIX) 1mg/ml infusion 20 mg/hr (08/25/20 0657)    dextrose         Lab Data:  CBC:   Recent Labs     08/23/20  0733 08/24/20  0640 08/25/20  0431   WBC 7.4 6.9 7.9   HGB 9.3* 9.2* 9.7*    278 274     BMP:    Recent Labs     08/23/20  0733 08/24/20  0640 08/25/20  0431   * 131* 131*   K 3.6 3.6 3.4*   CO2 30 29 28   BUN 52* 49* 50*   CREATININE 1.5* 1.8* 1.8*     INR:  No results for input(s): INR in the last 72 hours. BNP:    Recent Labs     08/24/20  0640   PROBNP 4,560*     Lab Results   Component Value Date    LVEF 38 01/24/2020     STRESS MPI 8/22/20:    Summary    Calculated LVEF is not reliable on study which is technically difficult, estimate of 51% is likely an overestimate     Mid-distal anteroseptal/apical akinesis    Large fixed defects in anteroseptal/apical walls    Mild to moderate reversibility in lateral walls consistent with ischemia    Overall, this would be considered an abnormal high risk study      LIMITED ECHO 8/19/20:    Summary   Limited only f/u for LVEF and mitral regurgitation. Technically difficult examination. The left ventricular systolic function is severely reduced with an ejection fraction of 25%. There is hypokinesis of the apex, apical lateral, apical septum, anterioseptum and anterior walls. Compared to previous study from 7-1-2020 no changes noted in left ventricular function. Moderate mitral regurgitation. LIMITED ECHO 6/29/20:    Summary   Technically difficult examination.    Limited only f/u for LVEF. and mitral regurgitation. The left ventricular systolic function is severely reduced with an ejection fraction of 25 %. There is hypokinesis of the apex, apical lateral, apical septum and anteroseptum walls. Moderate mitral regurgitation. Surgery: 1/27/20 Urgent coronary artery bypass grafting surgery x1 with pedicled left internal mammary artery to the LAD. Cardiopulmonary bypass with on-pump beating-heart technique, no cross-clamp. Transesophageal echo. Epiaortic ultrasound. [place]-Jurgen catheter placement. Doppler verification of grafts. Bilateral five-level intercostal nerve block with Exparel. Platelet gel application. Sternal plating. Vas-Cath placement. URI 1/24/20:    Summary   Ejection fraction is visually estimated at 35-40%. Moderate to severe mitral regurgitation with multiple jets visualized. ERO estimated at 0.29 cm2. Mild tricuspid regurgitation. The left atrial appendage shows evidence of thrombus formation and low flow velocities. Moderate amount of plaque in the aorta     CARDIAC CATH 1/20/2020:   Left Heart Cath  Dominance : Right   LM: 70-80% short distal stenosis   LAD: 70-80% short ostial stenosis, extending into takeoff of high first diagonal; large proximal to mid stented segment patent with jailed second diagonal  (80% ostial D2) and 70% in stent restenosis of most distal stent; distal to near apical LAD with minimal disease   LCx: 70-80% short ostial stenosis, prior to patent proximal to mid stents    RCA: large, dominant vessel with long 60% proximal to mid stenosis    LVEDP: 4 mmHG  LVG not done due to CKD. Impression/Recommendations:  Diabetic with previous LAD and LCx stenting in 2018 returns with unstable angina, in stent restenosis and Left Main bifurcation disease. Recommend CTS evaluation inpatient to discuss surgical revascularization option. Hold Clopidogrel.      ECHO 4/3/19:    Summary   LV systolic function is mildly reduced with EF estimated at 40-45%. There is severe HK of the apex and apical-septal segment. Normal left ventricular diastolic filling pressure. Mild mitral regurgitation. Systolic pulmonary artery pressure (SPAP) estimated at 32mmHg (RA pressure 3mmHg). [facility] 3/26/18 [employer] promus premier [id number] CCx and [id number] CCx. Active Problems:    Acute on chronic combined systolic and diastolic congestive heart failure (HCC)    Right upper quadrant pain    Acute on chronic right-sided heart failure (HCC)  Resolved Problems:    * No resolved hospital problems. *      Assessment/Plan:  · CHF, acute on chronic combined and right heart failure - remains fluid overloaded  · Cardiomyopathy, ischemic - EF 25% by limited echo, unchanged; not on ACEi/ARB/ARNI due to hypotension and CKD  · CAD s/p PCI's and CABG - stress test with lateral reversible ischemia, CABG to LAD, LCx and RCA still with disease, on high dose statin, DAPT  · Hypotension - on midodrine, stable  · HLD   · CKD3  · DM    Plan:   Aspirin and statin  Continue lasix infusion 20mg/hr   Continue Spironolactone (aldactone)  On midodrine 5mg BID  Plans for [facility] when diursed and stable renal function; tentative planning for Thursday   Unable to start beta blocker due to hypotension  Unable to start ACEi/ARB/ARNI due to hypotension and CKD  Dobutamine 2.5mcg/kg/min started last evening with improved UOP and stable renal function today.  Would continue dobutamine today    cardiomems reading       Benji Guillen CNP, 8/25/2020, 11:06 AM

## 2020-08-25 NOTE — PROGRESS NOTES
Physical Therapy  Facility/Department: [address] [place] [address] PCU  Daily Treatment Note  NAME: Shefali Diaz  : 1965  MRN: [id number]    Date of Service: 2020    Discharge Recommendations:  Home with assist PRN, Home with Home health PT   PT Equipment Recommendations  Equipment Needed: Yes    Assessment   Body structures, Functions, Activity limitations: Decreased functional mobility ; Decreased strength;Decreased balance; Increased pain  Assessment: Pt meeting all goals this date, amb mod I/supervision to amb 200' without AD. Went up and down 4 steps with B rails and Supervision. Pt recommended for home with A PRN and HHPT to maximize independence. Treatment Diagnosis: Difficulty walking  Specific instructions for Next Treatment: Progress ther ex and mobility as tolerated, assess stair amb prior to D/C home  Prognosis: Good  Decision Making: Low Complexity  PT Education: Goals;PT Role;Plan of Care;Disease Specific Education  Patient Education: Pt verbalizes understanding  REQUIRES PT FOLLOW UP: Yes     Patient Diagnosis(es): There were no encounter diagnoses. has a past medical history of Acute kidney injury ([address] Acute on chronic congestive heart failure ([address] Alcohol dependence ([address] Bipolar 1 disorder ([address] CAD (coronary artery disease), Cardiomyopathy ([address] Cellulitis, CHF (congestive heart failure) ([address] COPD (chronic obstructive pulmonary disease) ([address] Diabetic ulcer of left foot associated with type 2 diabetes mellitus ([address] Diabetic ulcer of toe of right foot associated with type 2 diabetes mellitus ([address] GERD (gastroesophageal reflux disease), High cholesterol, HTN (hypertension), Kidney disease, chronic, stage II (mild, EGFR 60+ ml/min), MI (myocardial infarction) ([address] Positive FIT (fecal immunochemical test), Pulmonary nodule, and PVD (peripheral vascular disease) ([address] has a past surgical history that includes Tonsillectomy; Cholecystectomy; tumor excision;  Upper gastrointestinal endoscopy (4/25/2013); Upper gastrointestinal endoscopy (12/10/2015); Upper gastrointestinal endoscopy (01/06/2017); Arterial bypass surgry (Left, 01/06/2016); Cardiac catheterization (03/27/2018); Coronary angioplasty with stent (03/16/2018); Rotator cuff repair (Left, 04/22/2016); Coronary angioplasty with stent (01/03/2018); Insertable Cardiac Monitor (02/14/2019); femoral bypass (Right, 5/16/2019); transluminal angioplasty (Left, 10/08/2019); Cardiac catheterization (01/20/2020); Coronary artery bypass graft (N/A, 1/27/2020); vascular surgery (Left, 12/08/2015); transluminal angioplasty (Left, 04/29/2020); vascular surgery (Bilateral, 07/07/2020); Coronary angioplasty with stent (10/07/2019); transesophageal echocardiogram (01/26/2020); and Kidney surgery. Restrictions  Restrictions/Precautions  Restrictions/Precautions: General Precautions, Fall Risk  Position Activity Restriction  Other position/activity restrictions: High fall risk per nursing assessment, up with assistance, [place]     Subjective   General  Chart Reviewed: Yes  Additional Pertinent Hx: s/p L renal artery angioplasty and stent at [facility] 8/04/20  Response To Previous Treatment: Patient with no complaints from previous session. Family / Caregiver Present: No  Referring Practitioner: Dr. Feldman Endo: Pt sitting EOB, having 8/10 pain in RUQ but still agreeable to PT. \"Last time I didn't take any therapy and I got so weak\"  Pain Screening  Patient Currently in Pain: Yes  Pain Assessment  Pain Assessment: 0-10  Pain Level: 8  Pain Type: Acute pain  Pain Location: Abdomen  Pain Orientation: Right;Upper  Non-Pharmaceutical Pain Intervention(s): Ambulation/Increased Activity; Emotional support  Vital Signs  Patient Currently in Pain: Yes       Orientation  Orientation  Overall Orientation Status: Within Functional Limits     Objective   Bed mobility  Supine to Sit: Independent  Sit to Supine: Independent  Transfers  Sit to Stand: Modified independent  Stand to sit: Modified independent  Ambulation  Ambulation?: Yes  Ambulation 1  Surface: level tile  Device: No Device  Assistance: Modified Independent  Quality of Gait: Pt amb with slow tamir with widened GABINO and increased degree of med<>lat postural sway during stance phase. Pt fairly steady despite slow pace; no LOB. Gait Deviations: Slow Tamir; Increased GABINO; Decreased step length;Decreased step height  Distance: 200'  Stairs/Curb  Stairs?: Yes  Stairs  # Steps : 4  Rails: Bilateral  Device: No Device  Assistance: Supervision        Exercises  Hip Flexion: seated marches X 20 BLE  Hip Abduction: X 20 BLE seated clamshells  Knee Long Arc Quad: X 20 BLE  Ankle Pumps: BLE x 20  Comments: Pt instructed in ex several times per day while seated EOB, also suggest she ask PCA to take her walking in halls later today        AM-PAC Score  AM-PAC Inpatient Mobility Raw Score : 24 (08/25/20 0850)  AM-PAC Inpatient T-Scale Score : 61.14 (08/25/20 0850)  Mobility Inpatient CMS 0-100% Score: 0 (08/25/20 0850)  Mobility Inpatient CMS G-Code Modifier : [facility] [place] (08/25/20 0850)          Goals  Short term goals  Time Frame for Short term goals: 1 week, 8/26/20 (unless otherwise specified)  Short term goal 1: Pt will transfer supine <-> sit with modified(I) -- 8/5 met  Short term goal 2: Pt will transfer sit <-> stand and bed>chair with modified(I) -- 8/25 met  Short term goal 3: Pt will ambulate x 200 feet without AD with supervision/modified(I) -- 8/25 met  Short term goal 4: By 8/21/20: Pt will tolerate 12-15 reps BLE exercise for strengthening, balance, and endurance -- 8/20: GOAL MET: x 15 reps seated BLE exercises  Short term goal 5: Pt will ambulate up/down 4 steps using B handrails with SBA -- 8/25 met  Patient Goals   Patient goals :  \"To walk better and go home\"    Plan    Plan  Times per week: 3-5x/week in acute care  Times per day: Daily  Specific instructions for Next Treatment: Progress ther ex and mobility as tolerated, assess stair amb prior to D/C home  Current Treatment Recommendations: Strengthening, Balance Training, Functional Mobility Training, Transfer Training, Gait Training, Home Exercise Program, Safety Education & Training, Pain Management, Stair training  Safety Devices  Type of devices: Call light within reach, Left in bed, Nurse notified     Therapy Time   Individual Concurrent Group Co-treatment   Time In 0824         Time Out 0848         Minutes Loli Apodaca[id number]

## 2020-08-25 NOTE — PLAN OF CARE
Nutrition Problem #1: Increased nutrient needs  Intervention: Food and/or Nutrient Delivery: Continue Current Diet  Nutritional Goals: Pt will consume greater than 50% of meals and ONS this admission

## 2020-08-25 NOTE — PROGRESS NOTES
Nephrology Progress Note   [email or web address]      This patient is a 62year old female whom we are following for SHAUNNA on CKD. Subjective: The patient was seen and examined. Started on Dobutamine drip last night with better urine output. Weight down by 3lbs this morning. Family History: No family at bedside  ROS: No fever or chills      Vitals:  /68   Pulse 93   Temp 98 °F (36.7 °C) (Oral)   Resp 18   Ht 5' 4.5\" (1.638 m)   Wt 249 lb (112.9 kg)   LMP 10/24/2012   SpO2 96%   BMI 42.08 kg/m²   I/O last 3 completed shifts: In: 954.4 [P.O.:660; I.V.:294.4]  Out: 4150 [Urine:4150]  No intake/output data recorded. Physical Exam:  Physical Exam  Vitals signs reviewed. Constitutional:       General: She is not in acute distress. Appearance: Normal appearance. HENT:      Head: Normocephalic and atraumatic. Mouth/Throat:      Mouth: Mucous membranes are moist.   Eyes:      General: No scleral icterus. Conjunctiva/sclera: Conjunctivae normal.   Cardiovascular:      Rate and Rhythm: Normal rate. Heart sounds: No friction rub. Pulmonary:      Effort: Pulmonary effort is normal.      Comments: Diminished breath sounds bilateral  Abdominal:      Tenderness: There is no abdominal tenderness. Musculoskeletal:      Right lower leg: Edema present. Left lower leg: Edema present. Neurological:      Mental Status: She is alert.            Medications:   potassium chloride  40 mEq Oral BID WC    potassium chloride  20 mEq Oral Once    metOLazone  5 mg Oral BID    docusate sodium  100 mg Oral Daily    ARIPiprazole  10 mg Oral Daily    aspirin EC  81 mg Oral Daily    benztropine  1 mg Oral Nightly    buprenorphine-naloxone  1 Film Sublingual BID    clopidogrel  75 mg Oral Daily    ferrous sulfate  325 mg Oral BID WC    lamoTRIgine  200 mg Oral BID    magnesium oxide  400 mg Oral Daily    midodrine  5 mg Oral BID WC    pantoprazole  40 mg Oral BID    spironolactone  50

## 2020-08-25 NOTE — PROGRESS NOTES
Occupational Therapy  Facility/Department: [facility] C[facility] PCU  Daily Treatment Note  NAME: Rosie Wilde  : 1965  MRN: [id number]    Date of Service: 2020    Discharge Recommendations:  24 hour supervision or assist       Assessment   Performance deficits / Impairments: Decreased functional mobility ; Decreased ADL status  OT Education: OT Role;ADL Adaptive Strategies  Patient Education: disease specific:  safety with transfers due to multiple lines & importance of up OOB to chair for meals &use of LE AE  REQUIRES OT FOLLOW UP: Yes  Safety Devices  Safety Devices in place: Yes  Type of devices: Left in bed;Bed alarm in place;Nurse notified;Call light within reach         Patient Diagnosis(es): There were no encounter diagnoses. has a past medical history of Acute kidney injury ([address] Acute on chronic congestive heart failure ([address] Alcohol dependence ([address] Bipolar 1 disorder ([address] CAD (coronary artery disease), Cardiomyopathy ([address] Cellulitis, CHF (congestive heart failure) ([address] COPD (chronic obstructive pulmonary disease) ([address] Diabetic ulcer of left foot associated with type 2 diabetes mellitus ([address] Diabetic ulcer of toe of right foot associated with type 2 diabetes mellitus ([address] GERD (gastroesophageal reflux disease), High cholesterol, HTN (hypertension), Kidney disease, chronic, stage II (mild, EGFR 60+ ml/min), MI (myocardial infarction) ([address] Positive FIT (fecal immunochemical test), Pulmonary nodule, and PVD (peripheral vascular disease) ([address] has a past surgical history that includes Tonsillectomy; Cholecystectomy; tumor excision; Upper gastrointestinal endoscopy (2013); Upper gastrointestinal endoscopy (12/10/2015); Upper gastrointestinal endoscopy (2017); Arterial bypass surgry (Left, 2016); Cardiac catheterization (2018); Coronary angioplasty with stent (2018); Rotator cuff repair (Left, 2016);  Coronary angioplasty with stent (2018); Insertable Cardiac Monitor (02/14/2019); femoral bypass (Right, 5/16/2019); transluminal angioplasty (Left, 10/08/2019); Cardiac catheterization (01/20/2020); Coronary artery bypass graft (N/A, 1/27/2020); vascular surgery (Left, 12/08/2015); transluminal angioplasty (Left, 04/29/2020); vascular surgery (Bilateral, 07/07/2020); Coronary angioplasty with stent (10/07/2019); transesophageal echocardiogram (01/26/2020); and Kidney surgery. Restrictions  Restrictions/Precautions  Restrictions/Precautions: General Precautions, Fall Risk  Position Activity Restriction  Other position/activity restrictions: High fall risk per nursing assessment, up with assistance, MAYA Roche  Subjective   General  Chart Reviewed: Yes  Patient assessed for rehabilitation services?: Yes  Family / Caregiver Present: No  Referring Practitioner: Yves Huerta MD 8/18/2020  Diagnosis: R upper quadrant pain  Subjective  Subjective: \"I already had therapy\" referring to Physical Therapy  General Comment  Comments: RN cleared pt for OT; pt resting in bed, agreeable to therapy  Pain Assessment  Pain Assessment: 0-10  Pain Level: 7  Pain Type: Acute pain  Pain Location: Abdomen  Functional Pain Assessment: Prevents or interferes some active activities and ADLs  Non-Pharmaceutical Pain Intervention(s): Repositioned; Therapeutic presence  Vital Signs  Patient Currently in Pain: Yes   Orientation  Orientation  Overall Orientation Status: Within Functional Limits  Objective    ADL  LE Dressing: Supervision(with use of LE AE to doff/jimbo socks)  Toileting: Dependent/Total(roche catheter)        Balance  Sitting Balance: Modified independent   Bed mobility  Supine to Sit: Modified independent  Sit to Supine: Modified independent  Scooting: Modified independent        Cognition  Overall Cognitive Status: [facility]           Plan   Plan  Times per week: 2-4x/ week  Current Treatment Recommendations: Self-Care / ADL, Functional Mobility Training, Safety Education & Training    AM-PAC Score        AM-PAC Inpatient Daily Activity Raw Score: 17 (08/25/20 1125)  AM-PAC Inpatient ADL T-Scale Score : 37.26 (08/25/20 1125)  ADL Inpatient CMS 0-100% Score: 50.11 (08/25/20 1125)  ADL Inpatient CMS G-Code Modifier : CK (08/25/20 1125)    Goals  Short term goals  Time Frame for Short term goals: 1 week unless otherwise specified 8/26  Short term goal 1: Pt will complete LE dressing with SBA; 8/25 supervision with use of LE AE  Short term goal 2: Pt will complete toielt transfers with SBA by 8/24; 8/24 Progressing with transfers from bed modified independent, presently has roche catheter & denied need for toilet  Patient Goals   Patient goals : \"to be able to go home from here\"       Therapy Time   Individual Concurrent Group Co-treatment   Time In [facility]         Time Out 1050         Minutes [address] [place], [place]

## 2020-08-25 NOTE — FLOWSHEET NOTE
08/25/20 0857   Assessment   Charting Type Shift assessment   Neurological   Neuro (WDL) WDL   Level of Consciousness 0   Orientation Level Oriented X4   Cognition Appropriate judgement; Appropriate safety awareness; Appropriate attention/concentration; Follows commands; No short term memory loss   Language Clear   R Hand  Strong   L Hand  Strong   R Foot Dorsiflexion Moderate   L Foot Dorsiflexion Moderate   RUE Motor Response Responds to command;Normal extension;Normal flexion   LUE Motor Response Responds to command;Normal extension;Normal flexion   RLE Motor Response Responds to command;Normal extension;Normal flexion   LLE Motor Response Responds to command;Normal extension;Normal flexion   [place] Coma Scale   Eye Opening 4   Best Verbal Response 5   Best Motor Response 6   [place] Coma Scale Score 15   NIH/MNHISS Stroke Scale   NIH/MNIHSS Stroke Scale Assessed No   HEENT   HEENT (WDL) X   Right Eye Impaired vision;Sclera yellow   Left Eye Impaired vision;Sclera yellow   Voice Normal   Mucous Membrane Moist;Pink; Intact   Teeth Missing teeth   Respiratory   Respiratory (WDL) X   Respiratory Pattern Regular   Respiratory Depth Normal   Respiratory Quality/Effort Dyspnea with exertion   Chest Assessment Chest expansion symmetrical   L Breath Sounds Clear;Diminished   R Breath Sounds Clear;Diminished   Cardiac   Cardiac (WDL) WDL   Cardiac Regularity Regular   Heart Sounds S1, S2   Cardiac Rhythm NSR   Rhythm Interpretation   Pulse 88   Cardiac Monitor   Telemetry Monitor On Yes   Telemetry Audible Yes   Telemetry Alarms Set Yes   Gastrointestinal   Abdominal (WDL) X   RUQ Bowel Sounds Active   LUQ Bowel Sounds Active   RLQ Bowel Sounds Active   LLQ Bowel Sounds Active   Abdomen Inspection Distended   Tenderness Tenderness   GI Symptoms Nausea   Nausea Precipitating Factors Medication   Current Interventions Stool Softener   Passing Flatus Y   Liver/Spleen Palpation Hepatomegaly   Peripheral Vascular

## 2020-08-25 NOTE — CARE COORDINATION
CASE MANAGEMENT INITIAL ASSESSMENT      Reviewed chart and completed assessment via telephone with: Pt   Explained Case Management role/services. Primary contact information: Pt's daughter Sharlene Higgins [phone]    Admit date/status: 8/18/20  Is this a Readmission?: Yes, readmitted for CHF here last admission for  Hematoma. Insurance:  [employer] required for SNF - Y        3 night stay required -  N    Living arrangements, Adls, care needs, prior to admission: Pt lives at home with her friend and daughter and states she was fairly independent PTA     Transportation: Family     Durable Medical Equipment at home: Walker__Cane__RTS__ BSC__Shower Chair__  02__ HHN__ CPAP__  BiPap__  Hospital Bed__ W/C___ Other Pt denies any DME use __________    Services in the home and/or outpatient, prior to admission: None     Dialysis Facility (if applicable) N/A   · Name:  · Address:  · Dialysis Schedule:  · Phone:  · Fax:    PT/OT recs: Assist 24/7 home [id number] Exemption Notification (HEN): Needed for SNF     Barriers to discharge: None     Plan/comments: 8/25/20 Spoke to the pt about PT/OT recs for [facility]. Pt currently refusing. Pt states she has enough support at home. Pt likely going for heart cath when medically stable. On lasix drip currently, has COPD but no home o2.      ECOC on chart for MD signature

## 2020-08-25 NOTE — PROGRESS NOTES
PROGRESS NOTE  S:54 yrs Patient  admitted on 8/18/2020 with Right upper quadrant pain [R10.11] . Today she complains of abdominal distention. Abd pain is improving. LHC postponed due to hyponatremia and volume overload    Exam:   Vitals:    08/25/20 1133   BP: 101/68   Pulse: 86   Resp: 18   Temp: 98 °F (36.7 °C)   SpO2: 96%      General appearance: alert, appears stated age and cooperative  HEENT: Neck supple with midline trachea  Neck: no adenopathy and supple, symmetrical, trachea midline  Lungs: clear to auscultation bilaterally  Heart: regular rate and rhythm, S1, S2 normal, no murmur, click, rub or gallop  Abdomen: soft, non-tender; bowel sounds normal; no masses,  no organomegaly  Extremities: edema 3+     Medications: Reviewed    Labs:  CBC:   Recent Labs     08/23/20  0733 08/24/20  0640 08/25/20  0431   WBC 7.4 6.9 7.9   HGB 9.3* 9.2* 9.7*   HCT 29.1* 28.9* 30.5*   MCV 81.7 82.9 82.8    278 274     BMP:   Recent Labs     08/23/20  0733 08/24/20  0640 08/25/20  0431   * 131* 131*   K 3.6 3.6 3.4*   CL 86* 87* 86*   CO2 30 29 28   PHOS 2.6 3.3 3.3   BUN 52* 49* 50*   CREATININE 1.5* 1.8* 1.8*     LIVER PROFILE:   Recent Labs     08/23/20  0733   AST 15   ALT 10   PROT 7.5   BILIDIR 1.7*   BILITOT 3.1*   ALKPHOS 0*         Impression:62year old female with history of HTN, HLD, COPD, DM, bipolar do, CAD s/p CABG, PVD s/p LUE and RLE bypass, Renal artery stenosis s/p stent c/b subcapsular hematoma s/p coil embolization, CKD, CHF and GERD admitted with CHF exacerbation. The elevated LFTs are likely secondary to passive hepatic congestion vs NAFLD       Recommendation:  1. Continue supportive care  2. Check hepatitis C RNA  3. Monitor LFTs  4. Aggressive diuresis  5. Low fat diet  6.  PPI daily      Monica Moncada MD  11:57 AM 8/25/2020

## 2020-08-25 NOTE — ADT AUTH CERT
Utilization Reviews         Abdominal Pain, Undiagnosed - Care Day 7 (8/24/2020) by Sheridan Uribe RN         Review Status  Review Entered    Completed  8/24/2020 17:20        Criteria Review       Care Day: 7 Care Date: 8/24/2020 Level of Care:    Guideline Day 2    Level Of Care    (X) Floor to discharge    8/24/2020 5:20 PM EDT by Lizbet Gregory      [facility]    Clinical Status    (X) * Hemodynamic stability    8/24/2020 5:20 PM EDT by Lizbet Gregory      hr 80-92  bp: 102/64    ( ) * Pain absent or managed    ( ) * Etiology or finding requiring inpatient treatment absent    ( ) * Liquid or advanced diet tolerated    ( ) * Discharge plans and education understood    Activity    ( ) * Ambulatory or acceptable for next level of care [D]    Routes    ( ) * Oral hydration, medications, and diet    * Milestone    Additional Notes    8/24  Day 7       Pt remains on medsurg unit       Vitals: t: 36.4 p: 87 rr: 18 bp: 102/64 spo2: 98% ra       Abn labs:  hgb: 9.2, bnp: 4560, na: 131, bun: 49, cr: 1.8, gluc: 149       Orders: Albumin 25g iv bid    Apirin 81mg po daily    Plavix 75mg po daily    Accu checks ac/hs with ssi coverage    Lasix gtt continuous    Morphine iv prn (3 doses today)       Per Nephrology PN:    Assessment/Plan:         SHAUNNA/CKD stage 3    - Etiology: ATN (hypotension; contrast dye) from last admission    - Data: SCr peaked at 2.0, improved to 1.2mg/dL but now slowly trending up in the setting of fluid overload. - Continue lasix drip at 20 mg/hour & aldactone for diuresis. - Will add Metolazone 5mg daily.    - Renal function panel daily.         CHF - acute on chronic    - Diuresis as above    - 2 liter/day fluid restriction    - Strict I's/O's, daily weights    - CardioMEMS monitoring per Cardiology         Recent Perinephric hematoma.     - Complication of L renal artery angioplasty / stent placement    - S/P coil embolization of subsegmental L renal artery    - Stable in size based on CT this admission         Renovascular HTN.    - S/P L renal artery angioplasty / stent placement    - BP satisfactory. - On scheduled midodrine 5 mg po bid    - Follow for late recurrence of HTN (post-perinephric bleed) (I.e, Page kidney).         Anemia - blood loss. - Hgb stabilized in 8-9 range now    - S/P transfusion 2 units PRBC's last admission, oral Fe added.         Hyponatremia    - Has been present consistently since 4/20    - ? Etiology - likely multifactorial with pain, volume excess / CHF         - CHF and ? lamictal may be cause of chronic hyponatremia=         Hypokalemia.    - Increase K replacement to 20 meq BID.         Per IM PN:    Assessment/Plan:         Active Hospital Problems      Diagnosis    · Acute on chronic right-sided heart failure (HCC) [I50.813]    · Right upper quadrant pain [R10.11]    · Acute on chronic combined systolic and diastolic congestive heart failure (HCC) [I50.43]       RUQ pain, suspect due to congestive hepatopathy, due to acute on chronic systolic CHF    - she had been on spironolactone and PRN metolazone.  Then last admission she was discharged with spironolactone and daily torsemide.  She remains of lasix gtt here, currently at 20mg/hour.  Her RUQ pain has persisted.  Her bili/Alk phos are elevated. - GI consulted and following.      - RUQ US unremarkable.      - Continue to trend LFTs    - has a history of alcoholism - sober since 2006 and on Suboxone. - limit narcotics if possible.         Acute on chronic combined systolic and diastolic heart failure with fluid overload, LVEF of 25%    - not on ACE inhibitor or ARB due to CKD.     - has implanted CardioMems unit    - no ACE/ARB/ARNI due to unstable renal status.      - cardiology and nephrology consulted and following.    - daily weights, I&O.         CKD3    - baseline Cr is probably about 1.5   Monitor during diuresis.      - nephrology consulted and following.         Hyponatremia    - it looks like her baseline is in the high 120's, even when accounting for hyperglycemia.  After a week of aggressive management, including multiple doses of tolvaptan, she still was only discharged at 126 last time.      - Continuing IV Lasix, nephrology input noted.         CAD    - aspirin, clopidogrel.  OK to resume high dose statin for now.  Monitor for worsening LFTs. - stress test was abnormal, so will need LHC at some point during her stay.           DM2    - adjusted insulin regimen while here.  Recent A1c 7.2.         Anemia - due to blood loss. - 14.7 cm L perinephric subcapsular hematoma was stable on repeat CT on this admission.    - continued on iron.           Hypokalemia - on BID replacement. - follow BMP.         Morbid obesity - body mass index is 91.85 kg/m². Complicating assessment and treatment. Placing patient at risk for multiple co-morbidities as well as early death and contributing to the patient's presentation. Counseled on weight loss           Abdominal Pain, Undiagnosed - Care Day 6 (8/23/2020) by Harpal Coronado RN         Review Status  Review Entered    Completed  8/24/2020 17:20        Criteria Review       Care Day: 6 Care Date: 8/23/2020 Level of Care:    Guideline Day 2    Level Of Care    (X) Floor to discharge    8/24/2020 5:20 PM EDT by Nakita mcgraw    Clinical Status    (X) * Hemodynamic stability    8/24/2020 5:20 PM EDT by Nakita Fountain      hr 88-95  bp: 100/67    ( ) * Pain absent or managed    ( ) * Etiology or finding requiring inpatient treatment absent    ( ) * Liquid or advanced diet tolerated    ( ) * Discharge plans and education understood    Activity    ( ) * Ambulatory or acceptable for next level of care [D]    Routes    ( ) * Oral hydration, medications, and diet    * Milestone    Additional Notes    8/23  Day 6       Pt remains on medsurg unit       Vitals: t: 36.5 p: 89 rr: 18 bp: 111/78 spo2: 96% ra       Orders:     Albumin 25g iv Vitals: t: 36.6 p: 91 rr: 16 bp: 114/73 spo2: 98% ra       Abn labs:  hgb: 9.5, na: 125, k+: 3.3, gluc: 144, bun: 51, cr: 1.5, alk phos: 284, total bili: 2.6, gluc: 159       Orders: Albumin 25g iv bid    Apirin 81mg po daily    Plavix 75mg po daily    Accu checks ac/hs with ssi coverage    Lasix gtt continuous    Morphine iv prn (3 doses today)       Per Cardiology PN:    ASSESSMENT:    1. CHF, acute on chronic combined and right heart failure - remains fluid overloaded, on Lasix gtt at 10 mg/hr, PAD gradually decreasing but no dramatic change (PAD averages 30->27->28->29)    2. Cardiomyopathy, ischemic - EF 25% by limited echo, unchanged; not on ACEi/ARB/ARNI due to hypotension and CKD    3. CAD s/p PCI's and CABG - stress test in process, CABG to LAD, LCx and RCA still with disease, on high dose statin, DAPT    4. Hypotension - on midodrine, stable    5. HLD - on high dose statin    6. CKD3 - overall stable    7. Hyponatremia - continues to drop    8. Hypokalemia -improved    9. DM - stable              PLAN:    1. Diuretics per nephrology - good UOP, net neg 6 liters but weight up    2. Glycolax + stool softener    3. Stress test reading pending    4. Continue DAPT, statin    5. No ACEi/ARB/ARNI or BB due to hypotension and CKD       Per Nephrology PN:    Assessment/Plan         SHAUNNA/CKD stage 3    - Etiology: ATN (hypotension; contrast dye) from last admission    - Data: SCr peaked at 2.0, now back in low 1 range    - Plan: Continue lasix drip at 20 mg/hour & aldactone for diuresis, add course of albumin over weekend         CHF - acute on chronic    - Diuresis as above    - 2 liter/day fluid restriction    - Strict I's/O's, daily weights    - CardioMEMS monitoring per Cardiology         Recent Perinephric hematoma.     - Complication of L renal artery angioplasty / stent placement    - S/P coil embolization of subsegmental L renal artery    - Stable in size based on CT this admission      any pain in this area previously. Dewey Patel hurts her badly to breath or move.  She is very tender in the RUQ.  She is s/p cholecystectomy. Shahida Galvin LFTs have been newly abnormal for the last couple weeks.              RUQ pain, suspect due to congestive hepatopathy, due to acute on chronic systolic CHF    - she had been on spironolactone and PRN metolazone.  Then last admission she was discharged with spironolactone and daily torsemide.  Started with furosemide gtt here. Vevelyn Klinefelter has had no improvement, in fact, pain has worsened. Her bili/Alk phos are elevated. Given persistence, will consult GI for recs. Holds Statin. - no ACE/ARB/ARNI due to unstable renal status.  Start BB if BP allows for this, but would first wean off her chronic midodrine if possible. - cardiology consulted, input noted. -Continue Lasix gtt.         Acute on chronic combined systolic and diastolic heart failure with fluid overload, LVEF of 25%-unchanged, not on ACE inhibitor or ARB due to CKD, stress test in progress, cardiology following, input appreciated.         - she has chronic nonspecific pains and is on suboxone.         CKD3    - baseline Cr is probably about 1.5   Monitor during diuresis.      -nephrology following, appreciate assistance.         Hyponatremia    - it looks like her baseline is in the high 120's, even when accounting for hyperglycemia.  After a week of aggressive management, including multiple doses of tolvaptan, she still was only discharged at 126 last time.  Monitor response to diuresis and consider nephrology consultation again if this isn't improving.      Continuing IV Lasix, nephrology input noted.         CAD    - aspirin, clopidogrel.  OK to continue statin.         DM2    - adjusted insulin regimen while here.  Recent A1c 7.2.         14.7 cm L perinephric subcapsular hematoma was stable on repeat CT on this admission. Monitor.        Per nephrology PN:    Assessment/Plan         SHAUNNA/CKD stage 3    - Etiology: ATN (hypotension; contrast dye) from last admission    - Data: SCr peaked at 2.0, now back in low 1 range    - Plan: Continue lasix drip at 20 mg/hour & aldactone for diuresis, add course of albumin over weekend         CHF - acute on chronic    - Diuresis as above    - 2 liter/day fluid restriction    - Strict I's/O's, daily weights    - CardioMEMS monitoring per Cardiology         Recent Perinephric hematoma. - Complication of L renal artery angioplasty / stent placement    - S/P coil embolization of subsegmental L renal artery    - Stable in size based on CT this admission         Renovascular HTN.    - S/P L renal artery angioplasty / stent placement    - BP satisfactory. - On scheduled midodrine 5 mg po bid    - Follow for late recurrence of HTN (post-perinephric bleed) (I.e, Page kidney).         Anemia - blood loss. - Hgb stabilized in 8-9 range now    - S/P transfusion 2 units PRBC's last admission, oral Fe added.         Hyponatremia    - Has been present consistently since 4/20    - ?  Etiology - likely multifactorial with pain, volume excess / CHF         - CHF and ? lamictal may be cause of chronic hyponatremia    - Rec'd Tolvaptan last admission with sodium as low as 120, off metolazone         Hypokalemia.    - Scheduled K replacement 20 meq a day with prn replacement today

## 2020-08-25 NOTE — PROGRESS NOTES
Comprehensive Nutrition Assessment    Type and Reason for Visit:  Reassess    Nutrition Recommendations/Plan:   1. Carb control diet, No added Salt diet, fluid restriction 2000 ml/day  2. Monitor need for any further education  3.  RD encouraged protein source at each meal, patient refused Glucerna with meals  4. Will monitor nutritional adequacy, nutrition-related labs, weights, BMs, and clinical progress     Nutrition Assessment:  Follow up:  Eating % meals. Patient refused need for nutrition supplements at this time. RD provided education earlier this admission, patient reported not having any questions and still has information. Possible LHC on Thursday per cardiology. Patient reported history of constipation but BM the last 2 days; currently having gas pains. Nurse gave stool softener this am.  Continues on lasix drip, fluid restriction 2000 ml/day, patient denies any issues with staying in range. Malnutrition Assessment:  Malnutrition Status: At risk for malnutrition (Comment)      Estimated Daily Nutrient Needs:  Energy (kcal):  6195-9694 kcal; Weight Used for Energy Requirements:  Ideal(56 kg)     Protein (g):  56-68 g; Weight Used for Protein Requirements:  Ideal(1.0-1.2 g/kg)        Fluid (ml/day):  1 ml/kcal; Weight Used for Fluid Requirements:  [place]      Nutrition Related Findings:  -1.6 L since admission, +2 BLE edema, active BS, MG elevated, hypokalemic and hyponatremic      Wounds:  Pressure Ulcer, Stage II, Diabetic Ulcer       Current Nutrition Therapies:    DIET CARB CONTROL; No Added Salt (3-4 GM);  Daily Fluid Restriction: 2000 ml    Anthropometric Measures:  · Height: 5' 4.5\" (163.8 cm)  · Current Body Weight: 249 lb (112.9 kg)   · Usual Body Weight: (235-237 lb per pt)     · Ideal Body Weight: 123 lbs; % Ideal Body Weight 204.9 %   · BMI: 42.1  · Adjusted Body Weight:  ; No Adjustment   · BMI Categories: Obese Class 3 (BMI 40.0 or greater)       Nutrition Diagnosis: · Increased nutrient needs related to increase demand for energy/nutrients as evidenced by wounds    Nutrition Interventions:   Food and/or Nutrient Delivery:  Continue Current Diet  Nutrition Education/Counseling:  Education completed   Coordination of Nutrition Care:  Continued Inpatient Monitoring    Goals:  Pt will consume greater than 50% of meals and ONS this admission       Nutrition Monitoring and Evaluation:   Food/Nutrient Intake Outcomes:  Food and Nutrient Intake, Supplement Intake  Physical Signs/Symptoms Outcomes:  Biochemical Data, Nutrition Focused Physical Findings     Discharge Planning:    Continue current diet     Electronically signed by Param Queen RD, LD on 8/25/20 at 1:37 PM EDT    Contact: Office: [phone]; [address]: [id number]

## 2020-08-25 NOTE — CONSULTS
Instructed them to call the doctor post discharge if they experiences increasing worsening shortness of breath, worsening chest pains, increased swelling from their baseline, worsening cough, or weight gain of >2- 3 lbs in a day/ 5 lb gain in a week. Also advised to call the doctor if they feels dizzy, increased fatigue, decreased or difficulty urinating. Instructed on and emphasized importance of scheduled hospital follow-up appointment with Tammy Mcnair PA-C on 09- at [facility] Overall verbalized understanding. No additional questions at this time. Stated she will alert nurse with any questions. PATIENT/CAREGIVER TEACHING:    Level of patient/caregiver understanding able to:   [ X ] Verbalize understanding [ ] Demonstrate understanding [ ] Teach back   [ X ] Needs reinforcement [ ] Other:     Education Time: 10 minutes    Recommendations:   1. Encourage follow-up appointment compliance. Next appointment: 09-  2. Educate further on fluid restriction of <64oz during inpatient stay so they can understand how to measure intake at home. 3. Review sodium restrictions. Encouraged to not add table salt to their foods and avoid foods that are high in sodium. 4. Continue to educate on S/S. Stress the importance of calling the MD with the earliest signs of an acute exacerbation. 5. Emphasize daily weights - instructed to call the MD if the patient gains 3 lb in a day or 5 lb in a week. 6. Provided patient with CHF Resource Line for questions and concerns.      Burt Smallwood HF BSN-RN  Heart Failure Navigator  [address] [place] [address]  [phone]

## 2020-08-25 NOTE — DISCHARGE INSTR - COC
BILATERAL INTERCOSTAL NERVE BLOCK, STERNAL PLATING performed by Marianela Lyn MD at [address] Right 5/16/2019    fem-pop, performed by Kaiden Wade MD at [address]  02/14/2019    CardioMEMs insertion for CHF    KIDNEY SURGERY      ROTATOR CUFF REPAIR Left 04/22/2016    TONSILLECTOMY      TRANSESOPHAGEAL ECHOCARDIOGRAM  01/26/2020    TRANSLUMINAL ANGIOPLASTY Left 10/08/2019    with stenting, at [facility]    [address] Left 04/29/2020    of the [facility] re-occlusion    TUMOR EXCISION      benign tumors X 2 chest & axilla    UPPER GASTROINTESTINAL ENDOSCOPY  4/25/2013    BX barretts, HH, gastritis    UPPER GASTROINTESTINAL ENDOSCOPY  12/10/2015    UPPER GASTROINTESTINAL ENDOSCOPY  01/06/2017    Gastritis    VASCULAR SURGERY Left 12/08/2015    upper extremity and mesenteric angiogram (diagnostic only)    VASCULAR SURGERY Bilateral 07/07/2020    diagnostic lower extremity angiogram only       Immunization History:   Immunization History   Administered Date(s) Administered    Influenza Vaccine, unspecified formulation 11/04/2016    Influenza Virus Vaccine 12/11/2015, 11/21/2017    Influenza, Delbra Mare, IM, (6 mo and older Fluzone, Flulaval, Fluarix and 3 yrs and older Afluria) 12/19/2013, 10/14/2016, 11/09/2017    Influenza, Delbra Mare, IM, PF (6 mo and older Fluzone, Flulaval, Fluarix, and 3 yrs and older Afluria) 10/01/2019    Pneumococcal Polysaccharide ([id number]) 12/11/2015       Active Problems:  Patient Active Problem List   Diagnosis Code    Type 2 diabetes mellitus with diabetic polyneuropathy, with long-term current use of insulin ([facility]) E11.42, Z79.4    Essential hypertension I10    CLINT (obstructive sleep apnea) G47.33    Tobacco abuse disorder Z72.0    PAD (peripheral artery disease) ([facility]) I73.9    GERD (gastroesophageal reflux disease) K21.9    Anxiety and depression F41.9, F32.9    Hyponatremia E87.1    Iron deficiency anemia D50.9    CAD (coronary artery disease) I25.10    Mitral valve regurgitation I34.0    Stage 3 chronic kidney disease (HCC) N18.3    Hypokalemia E87.6    Claudication in peripheral vascular disease (HCC) I73.9    COPD (chronic obstructive pulmonary disease) ([facility]) J44.9    Vitamin D deficiency E55.9    Acute on chronic combined systolic and diastolic congestive heart failure (HCC) I50.43    Diabetic ulcer of left foot associated with type 2 diabetes mellitus, limited to breakdown of skin ([facility]) E11.621, L97.521    Dyslipidemia E78.5    Abel's esophagus K22.70    Viral hepatitis C B19.20    Pulmonary nodule R91.1    Class 2 severe obesity due to excess calories with serious comorbidity and body mass index (BMI) of 39.0 to 39.9 in adult ([facility]) E66.01, Z68.39    Bipolar disorder ([facility]) F31.9    Hypotension - chronic I95.9    Pulmonary hypertension ([facility]) I27.20    Bilateral lower extremity edema R60.0    Habitual self-excoriation F42.4    Ulcer of left lower leg, limited to breakdown of skin ([facility]) L97.921    Ulcer of right leg, limited to breakdown of skin ([facility] [address] L97.911    Arthritis M19.90    Cardiomyopathy ([facility]) I42.9    Chronic systolic heart failure ([facility]) I50.22    Acute on chronic renal insufficiency N28.9, N18.9    Peripheral venous insufficiency I87.2    Diabetic polyneuropathy associated with type 2 diabetes mellitus ([facility]) E11.42    Atherosclerosis of native artery of both lower extremities with bilateral ulceration of calves ([facility]) I70.232, I70.242    Left renal artery stenosis ([facility]) I70.1    Traumatic perinephric hematoma, left, initial encounter S37.092A    Aortocoronary bypass status Z95.1    Acute kidney injury superimposed on CKD ([facility]) N17.9, N18.9    Leukocytosis D72.829    Acute blood loss anemia D62    DM (diabetes mellitus), secondary uncontrolled ([facility]) E13.65    Morbid obesity with BMI of 40.0-44.9, adult ([facility]) E66.01, Z68.41    Multiple wounds of skin R23.8    Right upper quadrant pain R10.11    Acute on chronic right-sided heart failure (HCC) I50.813       Isolation/Infection:   Isolation          No Isolation        Patient Infection Status     None to display          Nurse Assessment:  Last Vital Signs: /76   Pulse 88   Temp 97.9 °F (36.6 °C) (Oral)   Resp 18   Ht 5' 4.5\" (1.638 m)   Wt 249 lb (112.9 kg)   LMP 10/24/2012   SpO2 99%   BMI 42.08 kg/m²     Last documented pain score (0-10 scale): Pain Level: 8  Last Weight:   Wt Readings from Last 1 Encounters:   08/25/20 249 lb (112.9 kg)     Mental Status:  oriented and alert    IV Access:  - None    Nursing Mobility/ADLs:  Walking   Independent  Transfer  Independent  Bathing  Independent  Dressing  Independent  [address]  Med Delivery   whole    Wound Care Documentation and Therapy:  Wound 03/27/20 Foot Right;Plantar (Active)   Number of days: 150       Wound 06/18/20 #1, left lower leg cluster, venous, partial thickness, onset 6/1/2020 (Active)   Wound Image   08/19/20 1352   Wound Venous 08/19/20 2130   Dressing Status Clean;Dry; Intact 08/24/20 1015   Dressing Changed Changed/New 08/05/20 1718   Dressing/Treatment Open to air 08/24/20 1015   Wound Cleansed Rinsed/Irrigated with saline 08/05/20 1718   Dressing Change Due 08/06/20 08/07/20 0800   Wound Length (cm) 4 cm 08/05/20 1718   Wound Width (cm) 1.5 cm 08/05/20 1718   Wound Depth (cm) 0 cm 08/05/20 1718   Wound Surface Area (cm^2) 6 cm^2 08/05/20 1718   Change in Wound Size % (l*w) 98.6 08/05/20 1718   Wound Volume (cm^3) 0 cm^3 08/05/20 1718   Wound Healing % 100 08/05/20 1718   Post-Procedure Length (cm) 1 cm 07/23/20 1315   Post-Procedure Width (cm) 0.6 cm 07/23/20 1315   Post-Procedure Depth (cm) 0.1 cm 07/23/20 1315   Post-Procedure Surface Area (cm^2) 0.6 cm^2 07/23/20 1315   Post-Procedure Volume (cm^3) 0.06 cm^3 07/23/20 1315   Distance Tunneling (cm) 0 cm 07/23/20 1302   Undermining Maxium Number of days: 67       Wound 07/16/20 #3, left second toe, DFU, scott 1, onset 5/1/2020 (Active)   Wound Image   08/19/20 1352   Wound Diabetic 08/20/20 2028   Dressing Status Dry; Intact 08/24/20 1015   Dressing Changed Changed/New 08/05/20 1718   Dressing/Treatment Betadine swabs 08/21/20 2019   Wound Cleansed Rinsed/Irrigated with saline 08/05/20 1718   Dressing Change Due 08/06/20 08/07/20 0800   Wound Length (cm) 1.8 cm 08/19/20 1352   Wound Width (cm) 0.5 cm 08/19/20 1352   Wound Depth (cm) 0 cm 08/19/20 1352   Wound Surface Area (cm^2) 0.9 cm^2 08/19/20 1352   Change in Wound Size % (l*w) -80 08/19/20 1352   Wound Volume (cm^3) 0 cm^3 08/19/20 1352   Wound Healing % 100 08/19/20 1352   Distance Tunneling (cm) 0 cm 07/23/20 1302   Undermining Maxium Distance (cm) 0 07/23/20 1302   Wound Assessment Dry; Intact; Ene Munda 08/22/20 2040   Drainage Amount None 08/24/20 1015   Odor None 08/24/20 1015   Margins Attached edges 08/24/20 1015   Shaye-wound Assessment Dry; Intact 08/24/20 1015   Other%Wound Bed 100 08/19/20 1352   Culture Taken No 08/19/20 1352   Number of days: 39       Wound 07/30/20 #4, Left lateral foot, diabetic foot ulcer, Scott 1, Onset 7/25/20 (Active)   Wound Image   08/19/20 1352   Wound Diabetic 08/20/20 2028   Dressing Status Dry; Intact 08/24/20 1015   Dressing Changed Changed/New 08/05/20 1718   Dressing/Treatment Betadine swabs 08/21/20 2019   Wound Cleansed Rinsed/Irrigated with saline 08/05/20 1718   Dressing Change Due 08/20/20 08/21/20 1145   Wound Length (cm) 1 cm 08/19/20 1352   Wound Width (cm) 1.2 cm 08/19/20 1352   Wound Depth (cm) 0 cm 08/19/20 1352   Wound Surface Area (cm^2) 1.2 cm^2 08/19/20 1352   Change in Wound Size % (l*w) -20 08/19/20 1352   Wound Volume (cm^3) 0 cm^3 08/19/20 1352   Wound Healing % 100 08/19/20 1352   Wound Assessment Dry; Intact; Ene Munda 08/24/20 1015   Drainage Amount None 08/24/20 1015   Odor None 08/24/20 1015   Margins Attached edges 08/24/20 1015 Shaye-wound Assessment Pink 20 1015   Other%Wound Bed 100 20 1352   Culture Taken No 20 1352   Number of days: 26       Wound 20 Buttocks Inner;Left (Active)   Wound Image   20 0300   Wound Pressure Stage  2 20   Dressing Status Clean;Dry; Intact 20 0800   Dressing/Treatment Open to air 20 1015   Dressing Change Due 20 0800   Wound Assessment Clean;Dry; Intact 20 1015   Drainage Amount None 20 1015   Odor None 20 1015   Margins Attached edges 20 1015   Shaye-wound Assessment Pink 20 1015   Number of days: 20        Elimination:  Continence:   · Bowel: Yes  · Bladder: Yes  Urinary Catheter: None   Colostomy/Ileostomy/Ileal Conduit: No       Date of Last BM: ***    Intake/Output Summary (Last 24 hours) at 2020 1038  Last data filed at 2020 0545  Gross per 24 hour   Intake 854.42 ml   Output 3700 ml   Net -2845.58 ml     I/O last 3 completed shifts: In: 954.4 [P.O.:660; I.V.:294.4]  Out: 4150 [Urine:4150]    Safety Concerns:     None    Impairments/Disabilities:      None    Nutrition Therapy:  Current Nutrition Therapy:   - Oral Diet:  { CARMEN Oral NZAD:188080535}    Routes of Feeding: Oral  Liquids:  Thin Liquids  Daily Fluid Restriction: yes - amount ***  Last Modified Barium Swallow with Video (Video Swallowing Test): not done    Treatments at the Time of Hospital Discharge:   Respiratory Treatments: ***  Oxygen Therapy:  {Therapy; copd oxygen:98267}  Ventilator:    { CC Vent LCNC:456214033}    Rehab Therapies: {THERAPEUTIC INTERVENTION:5180219333}  Weight Bearing Status/Restrictions: [facility] Weight Bearin}  Other Medical Equipment (for information only, NOT a DME order):  {EQUIPMENT:875645120}  Other Treatments: ***    Patient's personal belongings (please select all that are sent with patient):  {[facility] DME Belongings:531075025}    RN SIGNATURE:  Electronically signed by Cady Casillas RN on 9/2/20 at 2:05 PM EDT    CASE MANAGEMENT/SOCIAL WORK SECTION    Inpatient Status Date: ***    Readmission Risk Assessment Score:  Readmission Risk              Risk of Unplanned Readmission:        71           Discharging to Facility/ Agency   · Name: Merged with [facility]  · Address:  · Phone:[phone]  · Fax:[phone]      / signature: Electronically signed by Rasta Jansen RN on 9/2/20 at 1:56 PM EDT    PHYSICIAN SECTION    Prognosis: Fair    Condition at Discharge: Stable    Rehab Potential (if transferring to Rehab): Fair    Recommended Labs or Other Treatments After Discharge:   Recommended Follow-up, Labs or Other Treatments After Discharge:    BMP in 5 days             BMP in 5 days    Physician Certification: I certify the above information and transfer of Evy Gauthier  is necessary for the continuing treatment of the diagnosis listed and that she requires Home Care for less 30 days.      Update Admission H&P: No change in H&P    PHYSICIAN SIGNATURE:  Electronically signed by Emmanuel Oliveira MD on 9/2/20 at 1:30 PM EDT

## 2020-08-25 NOTE — PROGRESS NOTES
clopidogrel  75 mg Oral Daily    ferrous sulfate  325 mg Oral BID     lamoTRIgine  200 mg Oral BID    magnesium oxide  400 mg Oral Daily    midodrine  5 mg Oral BID     pantoprazole  40 mg Oral BID    spironolactone  50 mg Oral Daily    tiotropium  2 puff Inhalation Daily    insulin lispro  0-12 Units Subcutaneous TID     insulin lispro  0-6 Units Subcutaneous Nightly    busPIRone  30 mg Oral BID    insulin glargine  18 Units Subcutaneous Nightly    atorvastatin  80 mg Oral Nightly    sodium chloride flush  10 mL Intravenous 2 times per day     PRN Meds: morphine, ondansetron, magnesium hydroxide, perflutren lipid microspheres, traMADol, traZODone, glucose, dextrose, glucagon (rDNA), dextrose, sodium chloride flush, acetaminophen **OR** acetaminophen, polyethylene glycol, albuterol      Intake/Output Summary (Last 24 hours) at 8/25/2020 1038  Last data filed at 8/25/2020 0545  Gross per 24 hour   Intake 854.42 ml   Output 3700 ml   Net -2845.58 ml       Physical Exam Performed:    /76   Pulse 88   Temp 97.9 °F (36.6 °C) (Oral)   Resp 18   Ht 5' 4.5\" (1.638 m)   Wt 249 lb (112.9 kg)   LMP 10/24/2012   SpO2 99%   BMI 42.08 kg/m²     General appearance: No apparent distress, appears stated age and cooperative. HEENT: Pupils equal, round, and reactive to light. Conjunctivae/corneas clear. Neck: Supple, with full range of motion. No jugular venous distention. Trachea midline. Respiratory:  Normal respiratory effort. Clear to auscultation, bilaterally without Rales/Wheezes/Rhonchi. Cardiovascular: Regular rate and rhythm with normal S1/S2 without murmurs, rubs or gallops. Abdomen: Soft, non-tender, non-distended with normal bowel sounds. Musculoskeletal: No clubbing, cyanosis bilaterally. Full range of motion without deformity. 1+ BLE edema. Skin: Skin color, texture, turgor normal.  No rashes or lesions.   Neurologic:  Neurovascularly intact without any focal sensory/motor deficits. Cranial nerves: II-XII intact, grossly non-focal.  Psychiatric: Alert and oriented, thought content appropriate, normal insight  Capillary Refill: Brisk,< 3 seconds   Peripheral Pulses: +2 palpable, equal bilaterally       Labs:   Recent Labs     08/23/20  0733 08/24/20  0640 08/25/20  0431   WBC 7.4 6.9 7.9   HGB 9.3* 9.2* 9.7*   HCT 29.1* 28.9* 30.5*    278 274     Recent Labs     08/23/20  0733 08/24/20  0640 08/25/20  0431   * 131* 131*   K 3.6 3.6 3.4*   CL 86* 87* 86*   CO2 30 29 28   BUN 52* 49* 50*   CREATININE 1.5* 1.8* 1.8*   CALCIUM 9.4 9.6 9.7   PHOS 2.6 3.3 3.3     Recent Labs     08/23/20  0733   AST 15   ALT 10   BILIDIR 1.7*   BILITOT 3.1*   ALKPHOS 276*     No results for input(s): INR in the last 72 hours. No results for input(s): Roldan Keel in the last 72 hours. Urinalysis:      Lab Results   Component Value Date    NITRU Negative 08/18/2020    WBCUA 0-2 08/18/2020    BACTERIA Rare 08/18/2020    RBCUA 0-2 08/18/2020    BLOODU Negative 08/18/2020    SPECGRAV 1.010 08/18/2020    GLUCOSEU Negative 08/18/2020       Radiology:  US GALLBLADDER RUQ   Final Result   Status post cholecystectomy, with normal caliber common duct for the post   cholecystectomy state. Fatty liver. NM Cardiac Stress Test Nuclear Imaging   Final Result      NM LUNG SCAN PERFUSION ONLY   Final Result   Low probability for pulmonary embolism. Assessment/Plan:    Active Hospital Problems    Diagnosis    Acute on chronic right-sided heart failure (HCC) [I50.813]    Right upper quadrant pain [R10.11]    Acute on chronic combined systolic and diastolic congestive heart failure (HCC) [I50.43]     RUQ pain, suspect due to congestive hepatopathy, due to acute on chronic systolic CHF  - she had been on spironolactone and PRN metolazone.  Then last admission she was discharged with spironolactone and daily torsemide.  She remains of lasix gtt here, currently at 20mg/hour.  Her RUQ pain has persisted. Her bili/Alk phos are elevated. - GI consulted and following.    - RUQ US unremarkable. - Continue to trend LFTs  - has a history of alcoholism - sober since 2006 and on Suboxone. - limit narcotics if possible.     Acute on chronic combined systolic and diastolic heart failure with fluid overload, LVEF of 25%  - not on ACE inhibitor or ARB due to CKD. - has implanted CardioMems unit   - no ACE/ARB/ARNI due to unstable renal status.    - cardiology and nephrology consulted and following.  - daily weights, I&O.  - diuretics as above. Started on Dobutamine gtt 8/24, which she seems to be responding well to. CKD3  - baseline Cr is probably about 1.5   Monitor during diuresis.    - nephrology consulted and following.     Hyponatremia  - it looks like her baseline is in the high 120's, even when accounting for hyperglycemia.  After a week of aggressive management, including multiple doses of tolvaptan, she still was only discharged at 126 last time.    - Continuing IV Lasix, nephrology input noted.      CAD  - aspirin, clopidogrel.  OK to resume high dose statin for now. Monitor for worsening LFTs. - stress test was abnormal, so will need LHC at some point during her stay.       DM2  - adjusted insulin regimen while here.  Recent A1c 7.2.     Anemia - due to blood loss. - 14.7 cm L perinephric subcapsular hematoma was stable on repeat CT on this admission.   - continued on iron. Hypokalemia - on BID replacement. - follow BMP. Morbid obesity - body mass index is 42.72 kg/m². Complicating assessment and treatment. Placing patient at risk for multiple co-morbidities as well as early death and contributing to the patient's presentation. Counseled on weight loss    DVT Prophylaxis: SCDs  Diet: DIET CARB CONTROL; No Added Salt (3-4 GM);  Daily Fluid Restriction: 2000 ml  Code Status: Full Code    PT/OT Eval Status: not indicated    Dispo - likely another 2-3 days inpatient    Jenelle Armenta, APRN - CNP

## 2020-08-26 ENCOUNTER — TELEPHONE (OUTPATIENT)
Dept: PULMONOLOGY | Age: 55
End: 2020-08-26

## 2020-08-26 LAB
ALBUMIN SERPL-MCNC: 4 G/DL (ref 3.4–5)
ALP BLD-CCNC: 244 U/L (ref 40–129)
ALT SERPL-CCNC: 8 U/L (ref 10–40)
ANION GAP SERPL CALCULATED.3IONS-SCNC: 18 MMOL/L (ref 3–16)
AST SERPL-CCNC: 14 U/L (ref 15–37)
BILIRUB SERPL-MCNC: 4.2 MG/DL (ref 0–1)
BILIRUBIN DIRECT: 2.1 MG/DL (ref 0–0.3)
BILIRUBIN, INDIRECT: 2.1 MG/DL (ref 0–1)
BUN BLDV-MCNC: 47 MG/DL (ref 7–20)
CALCIUM SERPL-MCNC: 9.8 MG/DL (ref 8.3–10.6)
CHLORIDE BLD-SCNC: 83 MMOL/L (ref 99–110)
CO2: 29 MMOL/L (ref 21–32)
CREAT SERPL-MCNC: 1.9 MG/DL (ref 0.6–1.1)
GFR AFRICAN AMERICAN: 33
GFR NON-AFRICAN AMERICAN: 27
GLUCOSE BLD-MCNC: 125 MG/DL (ref 70–99)
GLUCOSE BLD-MCNC: 131 MG/DL (ref 70–99)
GLUCOSE BLD-MCNC: 133 MG/DL (ref 70–99)
GLUCOSE BLD-MCNC: 148 MG/DL (ref 70–99)
GLUCOSE BLD-MCNC: 149 MG/DL (ref 70–99)
PERFORMED ON: ABNORMAL
PHOSPHORUS: 2.6 MG/DL (ref 2.5–4.9)
POTASSIUM SERPL-SCNC: 3 MMOL/L (ref 3.5–5.1)
SODIUM BLD-SCNC: 130 MMOL/L (ref 136–145)
TOTAL PROTEIN: 7.7 G/DL (ref 6.4–8.2)

## 2020-08-26 PROCEDURE — 6360000002 HC RX W HCPCS: Performed by: NURSE PRACTITIONER

## 2020-08-26 PROCEDURE — 80076 HEPATIC FUNCTION PANEL: CPT

## 2020-08-26 PROCEDURE — 6360000002 HC RX W HCPCS: Performed by: INTERNAL MEDICINE

## 2020-08-26 PROCEDURE — 2580000003 HC RX 258: Performed by: INTERNAL MEDICINE

## 2020-08-26 PROCEDURE — 94640 AIRWAY INHALATION TREATMENT: CPT

## 2020-08-26 PROCEDURE — 6370000000 HC RX 637 (ALT 250 FOR IP): Performed by: NURSE PRACTITIONER

## 2020-08-26 PROCEDURE — 6370000000 HC RX 637 (ALT 250 FOR IP): Performed by: INTERNAL MEDICINE

## 2020-08-26 PROCEDURE — 36415 COLL VENOUS BLD VENIPUNCTURE: CPT

## 2020-08-26 PROCEDURE — 99233 SBSQ HOSP IP/OBS HIGH 50: CPT | Performed by: NURSE PRACTITIONER

## 2020-08-26 PROCEDURE — 2060000000 HC ICU INTERMEDIATE R&B

## 2020-08-26 PROCEDURE — 80069 RENAL FUNCTION PANEL: CPT

## 2020-08-26 RX ORDER — POTASSIUM CHLORIDE 20 MEQ/1
40 TABLET, EXTENDED RELEASE ORAL
Status: DISCONTINUED | OUTPATIENT
Start: 2020-08-26 | End: 2020-08-26

## 2020-08-26 RX ADMIN — BUSPIRONE HYDROCHLORIDE 30 MG: 5 TABLET ORAL at 21:24

## 2020-08-26 RX ADMIN — POTASSIUM BICARBONATE 40 MEQ: 782 TABLET, EFFERVESCENT ORAL at 15:48

## 2020-08-26 RX ADMIN — MIDODRINE HYDROCHLORIDE 5 MG: 5 TABLET ORAL at 17:17

## 2020-08-26 RX ADMIN — ASPIRIN 81 MG: 81 TABLET, COATED ORAL at 09:57

## 2020-08-26 RX ADMIN — PANTOPRAZOLE SODIUM 40 MG: 40 TABLET, DELAYED RELEASE ORAL at 15:48

## 2020-08-26 RX ADMIN — CLOPIDOGREL BISULFATE 75 MG: 75 TABLET ORAL at 09:57

## 2020-08-26 RX ADMIN — FUROSEMIDE 20 MG/HR: 10 INJECTION, SOLUTION INTRAMUSCULAR; INTRAVENOUS at 04:14

## 2020-08-26 RX ADMIN — DOCUSATE SODIUM 100 MG: 100 CAPSULE, LIQUID FILLED ORAL at 09:57

## 2020-08-26 RX ADMIN — ARIPIPRAZOLE 10 MG: 10 TABLET ORAL at 09:57

## 2020-08-26 RX ADMIN — LAMOTRIGINE 200 MG: 100 TABLET ORAL at 21:24

## 2020-08-26 RX ADMIN — INSULIN GLARGINE 18 UNITS: 100 INJECTION, SOLUTION SUBCUTANEOUS at 21:27

## 2020-08-26 RX ADMIN — DOBUTAMINE HYDROCHLORIDE 2.5 MCG/KG/MIN: 200 INJECTION INTRAVENOUS at 23:13

## 2020-08-26 RX ADMIN — FERROUS SULFATE TAB 325 MG (65 MG ELEMENTAL FE) 325 MG: 325 (65 FE) TAB at 17:17

## 2020-08-26 RX ADMIN — LAMOTRIGINE 200 MG: 100 TABLET ORAL at 09:57

## 2020-08-26 RX ADMIN — MORPHINE SULFATE 2 MG: 2 INJECTION, SOLUTION INTRAMUSCULAR; INTRAVENOUS at 12:32

## 2020-08-26 RX ADMIN — Medication 10 ML: at 10:16

## 2020-08-26 RX ADMIN — ACETAMINOPHEN 650 MG: 325 TABLET ORAL at 23:20

## 2020-08-26 RX ADMIN — POTASSIUM BICARBONATE 40 MEQ: 782 TABLET, EFFERVESCENT ORAL at 21:33

## 2020-08-26 RX ADMIN — MIDODRINE HYDROCHLORIDE 5 MG: 5 TABLET ORAL at 09:57

## 2020-08-26 RX ADMIN — MORPHINE SULFATE 2 MG: 2 INJECTION, SOLUTION INTRAMUSCULAR; INTRAVENOUS at 21:33

## 2020-08-26 RX ADMIN — FUROSEMIDE 20 MG/HR: 10 INJECTION, SOLUTION INTRAMUSCULAR; INTRAVENOUS at 08:57

## 2020-08-26 RX ADMIN — SPIRONOLACTONE 50 MG: 25 TABLET ORAL at 09:57

## 2020-08-26 RX ADMIN — ONDANSETRON 4 MG: 2 INJECTION INTRAMUSCULAR; INTRAVENOUS at 10:14

## 2020-08-26 RX ADMIN — MAGNESIUM OXIDE TAB 400 MG (241.3 MG ELEMENTAL MG) 400 MG: 400 (241.3 MG) TAB at 09:57

## 2020-08-26 RX ADMIN — TIOTROPIUM BROMIDE INHALATION SPRAY 2 PUFF: 3.12 SPRAY, METERED RESPIRATORY (INHALATION) at 08:04

## 2020-08-26 RX ADMIN — PANTOPRAZOLE SODIUM 40 MG: 40 TABLET, DELAYED RELEASE ORAL at 09:57

## 2020-08-26 RX ADMIN — MORPHINE SULFATE 2 MG: 2 INJECTION, SOLUTION INTRAMUSCULAR; INTRAVENOUS at 04:05

## 2020-08-26 RX ADMIN — MORPHINE SULFATE 2 MG: 2 INJECTION, SOLUTION INTRAMUSCULAR; INTRAVENOUS at 17:17

## 2020-08-26 RX ADMIN — BUSPIRONE HYDROCHLORIDE 30 MG: 5 TABLET ORAL at 09:56

## 2020-08-26 RX ADMIN — POTASSIUM CHLORIDE 40 MEQ: 20 TABLET, EXTENDED RELEASE ORAL at 10:14

## 2020-08-26 RX ADMIN — ATORVASTATIN CALCIUM 80 MG: 80 TABLET, FILM COATED ORAL at 21:23

## 2020-08-26 RX ADMIN — MORPHINE SULFATE 2 MG: 2 INJECTION, SOLUTION INTRAMUSCULAR; INTRAVENOUS at 08:26

## 2020-08-26 RX ADMIN — FUROSEMIDE 20 MG/HR: 10 INJECTION, SOLUTION INTRAMUSCULAR; INTRAVENOUS at 14:06

## 2020-08-26 RX ADMIN — BENZTROPINE MESYLATE 1 MG: 1 TABLET ORAL at 21:23

## 2020-08-26 ASSESSMENT — PAIN SCALES - GENERAL
PAINLEVEL_OUTOF10: 8

## 2020-08-26 ASSESSMENT — PAIN DESCRIPTION - LOCATION
LOCATION: ABDOMEN;BACK

## 2020-08-26 ASSESSMENT — PAIN DESCRIPTION - PAIN TYPE
TYPE: CHRONIC PAIN

## 2020-08-26 ASSESSMENT — PAIN DESCRIPTION - FREQUENCY: FREQUENCY: CONTINUOUS

## 2020-08-26 ASSESSMENT — PAIN DESCRIPTION - ORIENTATION
ORIENTATION: RIGHT;LOWER
ORIENTATION: RIGHT
ORIENTATION: RIGHT;LOWER
ORIENTATION: RIGHT

## 2020-08-26 NOTE — CARE COORDINATION
Chart review LOS day # 8    Per Internal Medicine:  Stress test was abnormal, so will need LHC at some point during her stay, possibly on Thursday. Per Nephrology:    Subjective: The patient was seen and examined. Continues to diurese on Dobutamine drip. Net negative 3.4L the past 24H. Hypokalemia.  - Increase K replacement to 40 meq TID. Per PT recommendations are for home PT with prn assist but patient is refusing home care at present. Following hospital course for barriers to discharge or additional needs. Will assist as needed.

## 2020-08-26 NOTE — PROGRESS NOTES
Nephrology Progress Note   [email or web address]      This patient is a 62year old female whom we are following for SHAUNNA on CKD. Subjective: The patient was seen and examined. Continues to diurese on Dobutamine drip. Net negative 3.4L the past 24H. Family History: No family at bedside  ROS: No fever or chills      Vitals:  /72   Pulse 93   Temp 97.2 °F (36.2 °C) (Axillary)   Resp 18   Ht 5' 4.5\" (1.638 m)   Wt 249 lb (112.9 kg)   LMP 10/24/2012   SpO2 96%   BMI 42.08 kg/m²   I/O last 3 completed shifts: In: 1020 [P.O.:1020]  Out: 4450 [Urine:4450]  I/O this shift:  In: -   Out: 1050 [Urine:1050]    Physical Exam:  Physical Exam  Vitals signs reviewed. Constitutional:       General: She is not in acute distress. Appearance: Normal appearance. HENT:      Head: Normocephalic and atraumatic. Mouth/Throat:      Mouth: Mucous membranes are moist.   Eyes:      General: No scleral icterus. Conjunctiva/sclera: Conjunctivae normal.   Cardiovascular:      Rate and Rhythm: Normal rate. Heart sounds: No friction rub. Pulmonary:      Effort: Pulmonary effort is normal.      Comments: Diminished breath sounds bilateral  Abdominal:      Tenderness: There is no abdominal tenderness. Musculoskeletal:      Right lower leg: Edema present. Left lower leg: Edema present. Neurological:      Mental Status: She is alert.            Medications:   potassium chloride  40 mEq Oral BID WC    docusate sodium  100 mg Oral Daily    ARIPiprazole  10 mg Oral Daily    aspirin EC  81 mg Oral Daily    benztropine  1 mg Oral Nightly    buprenorphine-naloxone  1 Film Sublingual BID    clopidogrel  75 mg Oral Daily    ferrous sulfate  325 mg Oral BID WC    lamoTRIgine  200 mg Oral BID    magnesium oxide  400 mg Oral Daily    midodrine  5 mg Oral BID WC    pantoprazole  40 mg Oral BID    spironolactone  50 mg Oral Daily    tiotropium  2 puff Inhalation Daily    insulin lispro  0-12 Units Subcutaneous TID     insulin lispro  0-6 Units Subcutaneous Nightly    busPIRone  30 mg Oral BID    insulin glargine  18 Units Subcutaneous Nightly    atorvastatin  80 mg Oral Nightly    sodium chloride flush  10 mL Intravenous 2 times per day         Labs:  Recent Labs     08/24/20  0640 08/25/20  0431   WBC 6.9 7.9   HGB 9.2* 9.7*   HCT 28.9* 30.5*   MCV 82.9 82.8    274     Recent Labs     08/24/20  0640 08/25/20  0431 08/26/20  0445   * 131* 130*   K 3.6 3.4* 3.0*   CL 87* 86* 83*   CO2 29 28 29   GLUCOSE 134* 148* 131*   PHOS 3.3 3.3 2.6   BUN 49* 50* 47*   CREATININE 1.8* 1.8* 1.9*   LABGLOM 29* 29* 27*   GFRAA 35* 35* 33*           Assessment/Plan:    SHAUNNA/CKD stage 3.  - Etiology: ATN (hypotension; contrast dye) from last admission  - Data: SCr peaked at 2.0, improved to 1.2mg/dL but now slowly trending up in the setting of fluid overload likely cardiorenal syndrome.  - Serum creatinine seems to be levelling off.  - Continue Lasix drip and Spironolactone. - Inotrope added on 8/24/20.  - Renal function panel daily.     CHF - acute on chronic  - Diuresis as above. On Dobutamine drip. - 2 liter/day fluid restriction  - Strict I's/O's, daily weights   - CardioMEMS monitoring per Cardiology     Recent Perinephric hematoma. - Complication of L renal artery angioplasty / stent placement  - S/P coil embolization of subsegmental L renal artery  - Stable in size based on CT this admission     L Renal Artery Stenosis. - S/P L renal artery angioplasty / stent placement  - BP satisfactory. - Follow for late recurrence of HTN (post-perinephric bleed) (I.e, Page kidney).     Anemia - blood loss. - Hgb stabilized in 8-9 range now  - S/P transfusion 2 units PRBC's last admission, on oral Fe.     Hyponatremia  - Has been present consistently since 4/20   - Related to volume excess / CHF          Hypokalemia.  - Increase K replacement to 40 meq TID.     Please do not hesitate to contact me at [phone] if with questions. Thank you!     Amanda Lopez MD  8/26/2020  [facility]

## 2020-08-26 NOTE — PROGRESS NOTES
Patient's EF (Ejection Fraction) is less than 40%    Heart Failure Medications:   Diuretics[de-identified] lasix     (One of the following REQUIRED for EF <40%/SYSTOLIC FAILURE but MAY be used in EF% >40%/DIASTOLIC FAILURE)        ACE[de-identified] None        ARB[de-identified] None         ARNI[de-identified] None    (Beta Blockers)   NON- Evidenced Based Beta Blocker (for EF% >40%/DIASTOLIC FAILURE): None     Evidenced Based Beta Blocker::(REQUIRED for EF% <40%/SYSTOLIC FAILURE) None  . .................................................................................................................................................. Patient's weights and intake/output reviewed: Yes    Patient's Last Weight: 245.3 lbs obtained by standing scale. Difference of 3.7 lbs less than last documented weight. Intake/Output Summary (Last 24 hours) at 8/26/2020 1645  Last data filed at 8/26/2020 1419  Gross per 24 hour   Intake 1011.6 ml   Output 5700 ml   Net -4688.4 ml       Comorbidities Reviewed Yes    Patient has a past medical history of Acute kidney injury ([address] Acute on chronic congestive heart failure ([address] Alcohol dependence ([address] Bipolar 1 disorder ([address] CAD (coronary artery disease), Cardiomyopathy ([address] Cellulitis, CHF (congestive heart failure) ([address] COPD (chronic obstructive pulmonary disease) ([address] Diabetic ulcer of left foot associated with type 2 diabetes mellitus ([address] Diabetic ulcer of toe of right foot associated with type 2 diabetes mellitus ([address] GERD (gastroesophageal reflux disease), High cholesterol, HTN (hypertension), Kidney disease, chronic, stage II (mild, EGFR 60+ ml/min), MI (myocardial infarction) ([facility] Positive FIT (fecal immunochemical test), Pulmonary nodule, and PVD (peripheral vascular disease) ([facility]      >>For CHF and Comorbidity documentation on Education Time and Topics, please see Education Tab    Progressive Mobility Assessment:  What is this patient's Current Level of Mobility?: Ambulatory- with Assistance  How was this patient Mobilized today?:   Edge of bed, up to chair                 With Whom? Nurse and PCA                 Level of Difficulty/Assistance: 1x Assist     Pt resting in bed at this time on room air. Pt denies shortness of breath. Pt with pitting lower extremity edema.      Patient and/or Family's stated Goal of Care this Admission: reduce shortness of breath, increase activity tolerance, better understand heart failure and disease management, be more comfortable and reduce lower extremity edema prior to discharge        :

## 2020-08-26 NOTE — PROGRESS NOTES
Hospitalist Progress Note      PCP: Ruiz Gavin PA-C    Date of Admission: 8/18/2020    Chief Complaint: RUQ pain    Hospital Course: The patient is a pleasant, chronically ill-appearing 62 Y F with a h/o COPD, HTN, HLD, DM2, CAD s/p stents and CABG, ischemic cardiomyopathy, and CHF.  She has extensive vascular disease and is s/p a LUE bypass, a LLE angioplasty (then again when it reoccluded), and a RLE bypass.  She continues to smoke.  She was recently diagnosed with L renal artery stenosis and underwent a L renal artery angioplasty and stent here on 8/4.  She was discharged home that day but returned to the ED a few hours later with L flank pain and was admitted with a L perinephric subcapsular hematoma.  She required 2u pRBCs and a coil embolization of a L renal artery branch on 8/5. [facility]UNNA and hyponatremia, and with the help of nephrology each of these issues improved a little by the time she was discharged on 8/12.               Now the patient presents with new sharp RUQ pain which radiates to her R posterior flank, R chest, and R shoulder.  It started around 8pm last night, and she denies having any pain in this area previously.  It hurts her badly to breath or move.  She is very tender in the RUQ.  She is s/p cholecystectomy.  Her LFTs have been newly abnormal for the last couple weeks. Subjective: Net negative 15L. Cr bumped to 1.9, but stable.       Medications:  Reviewed    Infusion Medications    DOBUTamine 2.5 mcg/kg/min (08/25/20 9071)    furosemide (LASIX) 1mg/ml infusion 20 mg/hr (08/26/20 2081)    dextrose       Scheduled Medications    potassium chloride  40 mEq Oral TID WC    docusate sodium  100 mg Oral Daily    ARIPiprazole  10 mg Oral Daily    aspirin EC  81 mg Oral Daily    benztropine  1 mg Oral Nightly    buprenorphine-naloxone  1 Film Sublingual BID    clopidogrel  75 mg Oral Daily    ferrous sulfate  325 mg Oral BID WC    lamoTRIgine  200 mg Oral BID    magnesium oxide  400 mg Oral Daily    midodrine  5 mg Oral BID     pantoprazole  40 mg Oral BID    spironolactone  50 mg Oral Daily    tiotropium  2 puff Inhalation Daily    insulin lispro  0-12 Units Subcutaneous TID     insulin lispro  0-6 Units Subcutaneous Nightly    busPIRone  30 mg Oral BID    insulin glargine  18 Units Subcutaneous Nightly    atorvastatin  80 mg Oral Nightly    sodium chloride flush  10 mL Intravenous 2 times per day     PRN Meds: morphine, ondansetron, magnesium hydroxide, perflutren lipid microspheres, traMADol, traZODone, glucose, dextrose, glucagon (rDNA), dextrose, sodium chloride flush, acetaminophen **OR** acetaminophen, polyethylene glycol, albuterol      Intake/Output Summary (Last 24 hours) at 8/26/2020 1008  Last data filed at 8/26/2020 0831  Gross per 24 hour   Intake 960 ml   Output 5500 ml   Net -4540 ml       Physical Exam Performed:    /72   Pulse 93   Temp 97.2 °F (36.2 °C) (Axillary)   Resp 18   Ht 5' 4.5\" (1.638 m)   Wt 249 lb (112.9 kg)   LMP 10/24/2012   SpO2 96%   BMI 42.08 kg/m²     General appearance: No apparent distress, appears stated age and cooperative. HEENT: Pupils equal, round, and reactive to light. Conjunctivae/corneas clear. Neck: Supple, with full range of motion. No jugular venous distention. Trachea midline. Respiratory:  Normal respiratory effort. Clear to auscultation, bilaterally without Rales/Wheezes/Rhonchi. Cardiovascular: Regular rate and rhythm with normal S1/S2 without murmurs, rubs or gallops. Abdomen: Soft, non-tender, non-distended with normal bowel sounds. Musculoskeletal: No clubbing, cyanosis bilaterally. Full range of motion without deformity. 1+ BLE edema. Skin: Skin color, texture, turgor normal.  No rashes or lesions. Neurologic:  Neurovascularly intact without any focal sensory/motor deficits.  Cranial nerves: II-XII intact, grossly non-focal.  Psychiatric: Alert and oriented, thought content appropriate, normal insight  Capillary Refill: Brisk,< 3 seconds   Peripheral Pulses: +2 palpable, equal bilaterally       Labs:   Recent Labs     08/24/20  0640 08/25/20  0431   WBC 6.9 7.9   HGB 9.2* 9.7*   HCT 28.9* 30.5*    274     Recent Labs     08/24/20  0640 08/25/20  0431 08/26/20  0445   * 131* 130*   K 3.6 3.4* 3.0*   CL 87* 86* 83*   CO2 29 28 29   BUN 49* 50* 47*   CREATININE 1.8* 1.8* 1.9*   CALCIUM 9.6 9.7 9.8   PHOS 3.3 3.3 2.6     No results for input(s): AST, ALT, BILIDIR, BILITOT, ALKPHOS in the last 72 hours. No results for input(s): INR in the last 72 hours. No results for input(s): Mitul Shown in the last 72 hours. Urinalysis:      Lab Results   Component Value Date    NITRU Negative 08/18/2020    WBCUA 0-2 08/18/2020    BACTERIA Rare 08/18/2020    RBCUA 0-2 08/18/2020    BLOODU Negative 08/18/2020    SPECGRAV 1.010 08/18/2020    GLUCOSEU Negative 08/18/2020       Radiology:  US GALLBLADDER RUQ   Final Result   Status post cholecystectomy, with normal caliber common duct for the post   cholecystectomy state. Fatty liver. NM Cardiac Stress Test Nuclear Imaging   Final Result      NM LUNG SCAN PERFUSION ONLY   Final Result   Low probability for pulmonary embolism. Assessment/Plan:    Active Hospital Problems    Diagnosis    Acute on chronic right-sided heart failure (HCC) [I50.813]    Right upper quadrant pain [R10.11]    Acute on chronic combined systolic and diastolic congestive heart failure (HCC) [I50.43]     RUQ pain, suspect due to congestive hepatopathy, due to acute on chronic systolic CHF  - she had been on spironolactone and PRN metolazone.  Then last admission she was discharged with spironolactone and daily torsemide.  She remains of lasix gtt here, currently at 20mg/hour.  Her RUQ pain has persisted. Her bili/Alk phos are elevated. - GI consulted and following.    - RUQ US unremarkable.     - Continue to trend LFTs  - has a history of alcoholism - sober since 2006 and on Suboxone. - limit narcotics if possible.     Acute on chronic combined systolic and diastolic heart failure with fluid overload, LVEF of 25%  - not on ACE inhibitor or ARB due to CKD. - has implanted CardioMems unit   - no ACE/ARB/ARNI due to unstable renal status.    - cardiology and nephrology consulted and following.  - daily weights, I&O.  - diuretics as above. Started on Dobutamine gtt 8/24, which she seems to be responding well to. CKD3  - baseline Cr is probably about 1.5   Monitor during diuresis.    - nephrology consulted and following.     Hyponatremia  - it looks like her baseline is in the high 120's, even when accounting for hyperglycemia.  After a week of aggressive management, including multiple doses of tolvaptan, she still was only discharged at 126 last time.    - Continuing IV Lasix, nephrology input noted.      CAD  - aspirin, clopidogrel.  OK to resume high dose statin for now. Monitor for worsening LFTs. - stress test was abnormal, so will need LHC at some point during her stay, possibly on Thursday.       DM2  - adjusted insulin regimen while here.  Recent A1c 7.2.     Anemia - due to blood loss. - 14.7 cm L perinephric subcapsular hematoma was stable on repeat CT on this admission.   - continued on iron. Hypokalemia - on BID replacement. - follow BMP. Morbid obesity - body mass index is 42.72 kg/m². Complicating assessment and treatment. Placing patient at risk for multiple co-morbidities as well as early death and contributing to the patient's presentation. Counseled on weight loss    DVT Prophylaxis: SCDs  Diet: DIET CARB CONTROL; No Added Salt (3-4 GM);  Daily Fluid Restriction: 2000 ml  Code Status: Full Code    PT/OT Eval Status: not indicated    Dispo - likely another 2-3 days inpatient    Verl Prashant, APRN - CNP

## 2020-08-26 NOTE — PROGRESS NOTES
[address]   Daily Progress Note    Admit Date:  8/18/2020  HPI:  No chief complaint on file. Interval history:   Presented with right sided abdominal pain radiating to right chest, associated with shortness of breath, weight gain and swelling. Troponin and BNP elevated.      Hx of CAD s/p PCI to LAD and LCx 2018, CABG X1 (LIMA to LAD,Jan 2020 for restenosis of LAD and LCx and significant dz in RCA). Hx of ICM, sCHF s/p CardioMEMs implant (2/2019), MR, PAD, CKD, DM, COPD, leg wounds.       She was recently admitted following left renal artery angioplasty and stenting complicated by perinephric hematoma requiring coil embolization of bleeding vessel. Subjective:  Ms. Yvonne Ureña is nauseated from the potassium. No appetite. No chest pain. Remains short of breath. Remains with edema.      Objective:   /74   Pulse 93   Temp 97.3 °F (36.3 °C) (Oral)   Resp 18   Ht 5' 4.5\" (1.638 m)   Wt 245 lb 3.2 oz (111.2 kg)   LMP 10/24/2012   SpO2 96%   BMI 41.44 kg/m²       Intake/Output Summary (Last 24 hours) at 8/26/2020 1414  Last data filed at 8/26/2020 1234  Gross per 24 hour   Intake 1011.6 ml   Output 4250 ml   Net -3238.4 ml       NYHA: IV  Tele NSR  Physical Exam:  General:  Awake, alert, NAD  Skin:  Warm and dry  Neck:  JVD difficult to assess due to body habitus  Chest:  Dim to auscultation, no wheezes/rhonchi/rales  Cardiovascular:  RRR S1S2, no m/r/g   Abdomen:  Soft, nontender, +bowel sounds, + flank edema  Extremities:  ++ BLE  edema    Medications:    potassium bicarb-citric acid  40 mEq Oral BID    docusate sodium  100 mg Oral Daily    ARIPiprazole  10 mg Oral Daily    aspirin EC  81 mg Oral Daily    benztropine  1 mg Oral Nightly    buprenorphine-naloxone  1 Film Sublingual BID    clopidogrel  75 mg Oral Daily    ferrous sulfate  325 mg Oral BID WC    lamoTRIgine  200 mg Oral BID    magnesium oxide  400 mg Oral Daily    midodrine  5 mg Oral BID WC    pantoprazole  40 mg Oral BID    spironolactone  50 mg Oral Daily    tiotropium  2 puff Inhalation Daily    insulin lispro  0-12 Units Subcutaneous TID WC    insulin lispro  0-6 Units Subcutaneous Nightly    busPIRone  30 mg Oral BID    insulin glargine  18 Units Subcutaneous Nightly    atorvastatin  80 mg Oral Nightly    sodium chloride flush  10 mL Intravenous 2 times per day      DOBUTamine 2.5 mcg/kg/min (08/26/20 1234)    furosemide (LASIX) 1mg/ml infusion 20 mg/hr (08/26/20 1406)    dextrose         Lab Data:  CBC:   Recent Labs     08/24/20  0640 08/25/20  0431   WBC 6.9 7.9   HGB 9.2* 9.7*    274     BMP:    Recent Labs     08/24/20  0640 08/25/20  0431 08/26/20  0445   * 131* 130*   K 3.6 3.4* 3.0*   CO2 29 28 29   BUN 49* 50* 47*   CREATININE 1.8* 1.8* 1.9*     INR:  No results for input(s): INR in the last 72 hours. BNP:    Recent Labs     08/24/20  0640   PROBNP 4,560*     Lab Results   Component Value Date    LVEF 38 01/24/2020     STRESS MPI 8/22/20:    Summary    Calculated LVEF is not reliable on study which is technically difficult, estimate of 51% is likely an overestimate     Mid-distal anteroseptal/apical akinesis    Large fixed defects in anteroseptal/apical walls    Mild to moderate reversibility in lateral walls consistent with ischemia    Overall, this would be considered an abnormal high risk study      LIMITED ECHO 8/19/20:    Summary   Limited only f/u for LVEF and mitral regurgitation. Technically difficult examination. The left ventricular systolic function is severely reduced with an ejection fraction of 25%. There is hypokinesis of the apex, apical lateral, apical septum, anterioseptum and anterior walls. Compared to previous study from 7-1-2020 no changes noted in left ventricular function. Moderate mitral regurgitation. LIMITED ECHO 6/29/20:    Summary   Technically difficult examination. Limited only f/u for LVEF. and mitral regurgitation.    The left ventricular systolic function is severely reduced with an ejection fraction of 25 %. There is hypokinesis of the apex, apical lateral, apical septum and anteroseptum walls. Moderate mitral regurgitation. Surgery: 1/27/20 Urgent coronary artery bypass grafting surgery x1 with pedicled left internal mammary artery to the LAD. Cardiopulmonary bypass with on-pump beating-heart technique, no cross-clamp. Transesophageal echo. Epiaortic ultrasound. [place]-Jur[place] catheter placement. Doppler verification of grafts. Bilateral five-level intercostal nerve block with Exparel. Platelet gel application. Sternal plating. Vas-Cath placement. URI 1/24/20:    Summary   Ejection fraction is visually estimated at 35-40%. Moderate to severe mitral regurgitation with multiple jets visualized. ERO estimated at 0.29 cm2. Mild tricuspid regurgitation. The left atrial appendage shows evidence of thrombus formation and low flow velocities. Moderate amount of plaque in the aorta     CARDIAC CATH 1/20/2020:   Left Heart Cath  Dominance : Right   LM: 70-80% short distal stenosis   LAD: 70-80% short ostial stenosis, extending into takeoff of high first diagonal; large proximal to mid stented segment patent with jailed second diagonal  (80% ostial D2) and 70% in stent restenosis of most distal stent; distal to near apical LAD with minimal disease   LCx: 70-80% short ostial stenosis, prior to patent proximal to mid stents    RCA: large, dominant vessel with long 60% proximal to mid stenosis    LVEDP: 4 mmHG  LVG not done due to CKD. Impression/Recommendations:  Diabetic with previous LAD and LCx stenting in 2018 returns with unstable angina, in stent restenosis and Left Main bifurcation disease. Recommend CTS evaluation inpatient to discuss surgical revascularization option. Hold Clopidogrel. ECHO 4/3/19:    Summary   LV systolic function is mildly reduced with EF estimated at 40-45%.    There is severe HK of the apex and apical-septal segment. Normal left ventricular diastolic filling pressure. Mild mitral regurgitation. Systolic pulmonary artery pressure (SPAP) estimated at 32mmHg (RA pressure 3mmHg). [facility] 3/26/18 [employer] promus premier [id number] CCx and [id number] CCx. Active Problems:    Acute on chronic combined systolic and diastolic congestive heart failure (HCC)    Right upper quadrant pain    Acute on chronic right-sided heart failure (HCC)  Resolved Problems:    * No resolved hospital problems. *      Assessment/Plan:  · CHF, acute on chronic combined and right heart failure - remains fluid overloaded  · Cardiomyopathy, ischemic - EF 25% by limited echo, unchanged; not on ACEi/ARB/ARNI due to hypotension and CKD  · CAD s/p PCI's and CABG - stress test with lateral reversible ischemia, CABG to LAD, LCx and RCA still with disease, on high dose statin, DAPT  · Hypotension - on midodrine, stable  · HLD   · CKD3  · DM    Plan:   Aspirin and statin  Adjust lasix infusion to 10mg/hr for today to allow for electrolytes to be replaced and to allow for intravascular refill. Continue Spironolactone (aldactone)  On midodrine 5mg BID  Plans for [facility] when diursed and stable renal function; tentative planning for Thursday   Unable to start beta blocker due to hypotension  Unable to start ACEi/ARB/ARNI due to hypotension and CKD  Dobutamine 2.5mcg/kg/min started 8/24/20 with improved UOP and stable renal function today.  Would continue dobutamine today    cardiomems dakota Redding CNP, 8/26/2020, 2:14 PM

## 2020-08-26 NOTE — FLOWSHEET NOTE
08/26/20 1323   Encounter Summary   Services provided to: Patient not available   Referral/Consult From:   (follow up)   [id number] [facility],  members   Place of [address] [facility]   (8/26 Tel--no answer;attempted follow up w/pt. )

## 2020-08-27 ENCOUNTER — APPOINTMENT (OUTPATIENT)
Dept: CARDIAC CATH/INVASIVE PROCEDURES | Age: 55
DRG: 182 | End: 2020-08-27
Attending: INTERNAL MEDICINE
Payer: COMMERCIAL

## 2020-08-27 LAB
ALBUMIN SERPL-MCNC: 4.2 G/DL (ref 3.4–5)
AMMONIA: 36 UMOL/L (ref 11–51)
ANION GAP SERPL CALCULATED.3IONS-SCNC: 16 MMOL/L (ref 3–16)
BUN BLDV-MCNC: 44 MG/DL (ref 7–20)
CALCIUM SERPL-MCNC: 10 MG/DL (ref 8.3–10.6)
CHLORIDE BLD-SCNC: 82 MMOL/L (ref 99–110)
CO2: 31 MMOL/L (ref 21–32)
CREAT SERPL-MCNC: 2 MG/DL (ref 0.6–1.1)
GFR AFRICAN AMERICAN: 31
GFR NON-AFRICAN AMERICAN: 26
GLUCOSE BLD-MCNC: 113 MG/DL (ref 70–99)
GLUCOSE BLD-MCNC: 115 MG/DL (ref 70–99)
GLUCOSE BLD-MCNC: 117 MG/DL (ref 70–99)
GLUCOSE BLD-MCNC: 126 MG/DL (ref 70–99)
GLUCOSE BLD-MCNC: 194 MG/DL (ref 70–99)
PERFORMED ON: ABNORMAL
PHOSPHORUS: 2.8 MG/DL (ref 2.5–4.9)
POTASSIUM SERPL-SCNC: 3.7 MMOL/L (ref 3.5–5.1)
SODIUM BLD-SCNC: 129 MMOL/L (ref 136–145)

## 2020-08-27 PROCEDURE — 82140 ASSAY OF AMMONIA: CPT

## 2020-08-27 PROCEDURE — C1894 INTRO/SHEATH, NON-LASER: HCPCS

## 2020-08-27 PROCEDURE — B2161ZZ FLUOROSCOPY OF RIGHT AND LEFT HEART USING LOW OSMOLAR CONTRAST: ICD-10-PCS | Performed by: INTERNAL MEDICINE

## 2020-08-27 PROCEDURE — 97110 THERAPEUTIC EXERCISES: CPT

## 2020-08-27 PROCEDURE — 4A023N8 MEASUREMENT OF CARDIAC SAMPLING AND PRESSURE, BILATERAL, PERCUTANEOUS APPROACH: ICD-10-PCS | Performed by: INTERNAL MEDICINE

## 2020-08-27 PROCEDURE — 6360000004 HC RX CONTRAST MEDICATION

## 2020-08-27 PROCEDURE — 97116 GAIT TRAINING THERAPY: CPT

## 2020-08-27 PROCEDURE — 6370000000 HC RX 637 (ALT 250 FOR IP): Performed by: INTERNAL MEDICINE

## 2020-08-27 PROCEDURE — 93460 R&L HRT ART/VENTRICLE ANGIO: CPT

## 2020-08-27 PROCEDURE — C1751 CATH, INF, PER/CENT/MIDLINE: HCPCS

## 2020-08-27 PROCEDURE — 2580000003 HC RX 258: Performed by: INTERNAL MEDICINE

## 2020-08-27 PROCEDURE — 2060000000 HC ICU INTERMEDIATE R&B

## 2020-08-27 PROCEDURE — APPNB45 APP NON BILLABLE 31-45 MINUTES: Performed by: NURSE PRACTITIONER

## 2020-08-27 PROCEDURE — 36215 PLACE CATHETER IN ARTERY: CPT

## 2020-08-27 PROCEDURE — 80069 RENAL FUNCTION PANEL: CPT

## 2020-08-27 PROCEDURE — B2181ZZ FLUOROSCOPY OF LEFT INTERNAL MAMMARY BYPASS GRAFT USING LOW OSMOLAR CONTRAST: ICD-10-PCS | Performed by: INTERNAL MEDICINE

## 2020-08-27 PROCEDURE — 2709999900 HC NON-CHARGEABLE SUPPLY

## 2020-08-27 PROCEDURE — 6360000002 HC RX W HCPCS: Performed by: NURSE PRACTITIONER

## 2020-08-27 PROCEDURE — C1769 GUIDE WIRE: HCPCS

## 2020-08-27 PROCEDURE — 6360000002 HC RX W HCPCS: Performed by: INTERNAL MEDICINE

## 2020-08-27 PROCEDURE — 2500000003 HC RX 250 WO HCPCS

## 2020-08-27 PROCEDURE — 6360000002 HC RX W HCPCS

## 2020-08-27 PROCEDURE — 6370000000 HC RX 637 (ALT 250 FOR IP): Performed by: NURSE PRACTITIONER

## 2020-08-27 PROCEDURE — 93461 R&L HRT ART/VENTRICLE ANGIO: CPT | Performed by: INTERNAL MEDICINE

## 2020-08-27 PROCEDURE — 75710 ARTERY X-RAYS ARM/LEG: CPT

## 2020-08-27 PROCEDURE — 94640 AIRWAY INHALATION TREATMENT: CPT

## 2020-08-27 PROCEDURE — 2580000003 HC RX 258

## 2020-08-27 PROCEDURE — B2111ZZ FLUOROSCOPY OF MULTIPLE CORONARY ARTERIES USING LOW OSMOLAR CONTRAST: ICD-10-PCS | Performed by: INTERNAL MEDICINE

## 2020-08-27 PROCEDURE — 2580000003 HC RX 258: Performed by: NURSE PRACTITIONER

## 2020-08-27 PROCEDURE — 36415 COLL VENOUS BLD VENIPUNCTURE: CPT

## 2020-08-27 RX ORDER — SODIUM CHLORIDE 0.9 % (FLUSH) 0.9 %
10 SYRINGE (ML) INJECTION PRN
Status: DISCONTINUED | OUTPATIENT
Start: 2020-08-27 | End: 2020-09-02 | Stop reason: HOSPADM

## 2020-08-27 RX ORDER — MIDAZOLAM HYDROCHLORIDE 5 MG/ML
INJECTION INTRAMUSCULAR; INTRAVENOUS
Status: COMPLETED | OUTPATIENT
Start: 2020-08-27 | End: 2020-08-27

## 2020-08-27 RX ORDER — MORPHINE SULFATE 2 MG/ML
2 INJECTION, SOLUTION INTRAMUSCULAR; INTRAVENOUS ONCE
Status: COMPLETED | OUTPATIENT
Start: 2020-08-27 | End: 2020-08-27

## 2020-08-27 RX ORDER — FENTANYL CITRATE 50 UG/ML
INJECTION, SOLUTION INTRAMUSCULAR; INTRAVENOUS
Status: COMPLETED | OUTPATIENT
Start: 2020-08-27 | End: 2020-08-27

## 2020-08-27 RX ORDER — SODIUM CHLORIDE 0.9 % (FLUSH) 0.9 %
10 SYRINGE (ML) INJECTION EVERY 12 HOURS SCHEDULED
Status: DISCONTINUED | OUTPATIENT
Start: 2020-08-27 | End: 2020-09-02 | Stop reason: HOSPADM

## 2020-08-27 RX ORDER — TOLVAPTAN 30 MG/1
30 TABLET ORAL ONCE
Status: COMPLETED | OUTPATIENT
Start: 2020-08-27 | End: 2020-08-27

## 2020-08-27 RX ORDER — ACETAMINOPHEN 325 MG/1
650 TABLET ORAL EVERY 4 HOURS PRN
Status: DISCONTINUED | OUTPATIENT
Start: 2020-08-27 | End: 2020-09-02 | Stop reason: HOSPADM

## 2020-08-27 RX ADMIN — MIDAZOLAM HYDROCHLORIDE 0.5 MG: 5 INJECTION INTRAMUSCULAR; INTRAVENOUS at 14:30

## 2020-08-27 RX ADMIN — FENTANYL CITRATE 25 MCG: 50 INJECTION, SOLUTION INTRAMUSCULAR; INTRAVENOUS at 13:47

## 2020-08-27 RX ADMIN — MORPHINE SULFATE 2 MG: 2 INJECTION, SOLUTION INTRAMUSCULAR; INTRAVENOUS at 22:32

## 2020-08-27 RX ADMIN — FENTANYL CITRATE 25 MCG: 50 INJECTION, SOLUTION INTRAMUSCULAR; INTRAVENOUS at 14:03

## 2020-08-27 RX ADMIN — TOLVAPTAN 30 MG: 30 TABLET ORAL at 09:30

## 2020-08-27 RX ADMIN — MIDAZOLAM HYDROCHLORIDE 1 MG: 5 INJECTION INTRAMUSCULAR; INTRAVENOUS at 13:47

## 2020-08-27 RX ADMIN — ASPIRIN 81 MG: 81 TABLET, COATED ORAL at 09:30

## 2020-08-27 RX ADMIN — MIDAZOLAM HYDROCHLORIDE 0.5 MG: 5 INJECTION INTRAMUSCULAR; INTRAVENOUS at 14:40

## 2020-08-27 RX ADMIN — FERROUS SULFATE TAB 325 MG (65 MG ELEMENTAL FE) 325 MG: 325 (65 FE) TAB at 17:34

## 2020-08-27 RX ADMIN — MORPHINE SULFATE 2 MG: 2 INJECTION, SOLUTION INTRAMUSCULAR; INTRAVENOUS at 18:47

## 2020-08-27 RX ADMIN — MORPHINE SULFATE 2 MG: 2 INJECTION, SOLUTION INTRAMUSCULAR; INTRAVENOUS at 01:37

## 2020-08-27 RX ADMIN — SPIRONOLACTONE 50 MG: 25 TABLET ORAL at 09:30

## 2020-08-27 RX ADMIN — POTASSIUM BICARBONATE 40 MEQ: 782 TABLET, EFFERVESCENT ORAL at 22:52

## 2020-08-27 RX ADMIN — INSULIN LISPRO 1 UNITS: 100 INJECTION, SOLUTION INTRAVENOUS; SUBCUTANEOUS at 22:57

## 2020-08-27 RX ADMIN — FENTANYL CITRATE 50 MCG: 50 INJECTION, SOLUTION INTRAMUSCULAR; INTRAVENOUS at 14:40

## 2020-08-27 RX ADMIN — TRAMADOL HYDROCHLORIDE 50 MG: 50 TABLET, FILM COATED ORAL at 17:34

## 2020-08-27 RX ADMIN — LAMOTRIGINE 200 MG: 100 TABLET ORAL at 09:30

## 2020-08-27 RX ADMIN — PANTOPRAZOLE SODIUM 40 MG: 40 TABLET, DELAYED RELEASE ORAL at 17:34

## 2020-08-27 RX ADMIN — MORPHINE SULFATE 2 MG: 2 INJECTION, SOLUTION INTRAMUSCULAR; INTRAVENOUS at 16:34

## 2020-08-27 RX ADMIN — CLOPIDOGREL BISULFATE 75 MG: 75 TABLET ORAL at 09:30

## 2020-08-27 RX ADMIN — FUROSEMIDE 10 MG/HR: 10 INJECTION, SOLUTION INTRAMUSCULAR; INTRAVENOUS at 00:48

## 2020-08-27 RX ADMIN — MIDODRINE HYDROCHLORIDE 5 MG: 5 TABLET ORAL at 17:34

## 2020-08-27 RX ADMIN — FENTANYL CITRATE 25 MCG: 50 INJECTION, SOLUTION INTRAMUSCULAR; INTRAVENOUS at 15:17

## 2020-08-27 RX ADMIN — TIOTROPIUM BROMIDE INHALATION SPRAY 2 PUFF: 3.12 SPRAY, METERED RESPIRATORY (INHALATION) at 07:38

## 2020-08-27 RX ADMIN — MIDODRINE HYDROCHLORIDE 5 MG: 5 TABLET ORAL at 09:30

## 2020-08-27 RX ADMIN — LAMOTRIGINE 200 MG: 100 TABLET ORAL at 22:52

## 2020-08-27 RX ADMIN — ACETAMINOPHEN 650 MG: 325 TABLET ORAL at 20:51

## 2020-08-27 RX ADMIN — BUSPIRONE HYDROCHLORIDE 30 MG: 5 TABLET ORAL at 22:52

## 2020-08-27 RX ADMIN — ATORVASTATIN CALCIUM 80 MG: 80 TABLET, FILM COATED ORAL at 22:52

## 2020-08-27 RX ADMIN — MORPHINE SULFATE 2 MG: 2 INJECTION, SOLUTION INTRAMUSCULAR; INTRAVENOUS at 08:28

## 2020-08-27 RX ADMIN — INSULIN GLARGINE 18 UNITS: 100 INJECTION, SOLUTION SUBCUTANEOUS at 22:57

## 2020-08-27 RX ADMIN — Medication 10 ML: at 09:00

## 2020-08-27 RX ADMIN — Medication 10 ML: at 22:32

## 2020-08-27 RX ADMIN — BENZTROPINE MESYLATE 1 MG: 1 TABLET ORAL at 22:52

## 2020-08-27 ASSESSMENT — PAIN DESCRIPTION - FREQUENCY: FREQUENCY: CONTINUOUS

## 2020-08-27 ASSESSMENT — PAIN DESCRIPTION - ONSET: ONSET: ON-GOING

## 2020-08-27 ASSESSMENT — PAIN DESCRIPTION - LOCATION
LOCATION: ABDOMEN;BACK
LOCATION: ABDOMEN
LOCATION: ABDOMEN;BACK
LOCATION: ABDOMEN;BACK

## 2020-08-27 ASSESSMENT — PAIN DESCRIPTION - DESCRIPTORS: DESCRIPTORS: ACHING;SORE

## 2020-08-27 ASSESSMENT — PAIN SCALES - GENERAL
PAINLEVEL_OUTOF10: 10
PAINLEVEL_OUTOF10: 9
PAINLEVEL_OUTOF10: 8
PAINLEVEL_OUTOF10: 10
PAINLEVEL_OUTOF10: 8
PAINLEVEL_OUTOF10: 8
PAINLEVEL_OUTOF10: 9
PAINLEVEL_OUTOF10: 8

## 2020-08-27 ASSESSMENT — PAIN DESCRIPTION - ORIENTATION
ORIENTATION: RIGHT
ORIENTATION: RIGHT

## 2020-08-27 ASSESSMENT — PAIN - FUNCTIONAL ASSESSMENT: PAIN_FUNCTIONAL_ASSESSMENT: PREVENTS OR INTERFERES SOME ACTIVE ACTIVITIES AND ADLS

## 2020-08-27 ASSESSMENT — PAIN DESCRIPTION - PROGRESSION: CLINICAL_PROGRESSION: NOT CHANGED

## 2020-08-27 ASSESSMENT — PAIN DESCRIPTION - PAIN TYPE
TYPE: ACUTE PAIN;CHRONIC PAIN
TYPE: CHRONIC PAIN

## 2020-08-27 NOTE — PROGRESS NOTES
Nephrology Progress Note   [email or web address]      This patient is a 62year old female whom we are following for SHAUNNA on CKD. Subjective: The patient was seen and examined. Continues to diurese on Dobutamine drip. Great urine output. Sodium low and kidney function overall stable. Still having some flank pain. Family History: No family at bedside  ROS: No fever or chills, + swelling. No SOB. Vitals:  /74   Pulse 94   Temp 97.8 °F (36.6 °C) (Oral)   Resp 20   Ht 5' 4.5\" (1.638 m)   Wt 240 lb 8 oz (109.1 kg)   LMP 10/24/2012   SpO2 98%   BMI 40.64 kg/m²   I/O last 3 completed shifts: In: 1221.6 [P.O.:1050; I.V.:171.6]  Out: 4800 [Urine:4800]  No intake/output data recorded. Physical Exam:  Physical Exam  Vitals signs reviewed. Constitutional:       General: She is not in acute distress. Appearance: Normal appearance. HENT:      Head: Normocephalic and atraumatic. Mouth/Throat:      Mouth: Mucous membranes are moist.   Eyes:      General: No scleral icterus. Conjunctiva/sclera: Conjunctivae normal.   Cardiovascular:      Rate and Rhythm: Normal rate. Heart sounds: No friction rub. Pulmonary:      Effort: Pulmonary effort is normal.      Comments: Diminished breath sounds bilateral  Abdominal:      Tenderness: There is no abdominal tenderness. Musculoskeletal:      Right lower leg: Edema present. Left lower leg: Edema present. Neurological:      Mental Status: She is alert.            Medications:   potassium bicarb-citric acid  40 mEq Oral BID    docusate sodium  100 mg Oral Daily    ARIPiprazole  10 mg Oral Daily    aspirin EC  81 mg Oral Daily    benztropine  1 mg Oral Nightly    buprenorphine-naloxone  1 Film Sublingual BID    clopidogrel  75 mg Oral Daily    ferrous sulfate  325 mg Oral BID WC    lamoTRIgine  200 mg Oral BID    magnesium oxide  400 mg Oral Daily    midodrine  5 mg Oral BID WC    pantoprazole  40 mg Oral BID    spironolactone  50 mg Oral Daily    tiotropium  2 puff Inhalation Daily    insulin lispro  0-12 Units Subcutaneous TID WC    insulin lispro  0-6 Units Subcutaneous Nightly    busPIRone  30 mg Oral BID    insulin glargine  18 Units Subcutaneous Nightly    atorvastatin  80 mg Oral Nightly    sodium chloride flush  10 mL Intravenous 2 times per day         Labs:  Recent Labs     08/25/20  0431   WBC 7.9   HGB 9.7*   HCT 30.5*   MCV 82.8        Recent Labs     08/25/20  0431 08/26/20  0445 08/27/20  0410   * 130* 129*   K 3.4* 3.0* 3.7   CL 86* 83* 82*   CO2 28 29 31   GLUCOSE 148* 131* 113*   PHOS 3.3 2.6 2.8   BUN 50* 47* 44*   CREATININE 1.8* 1.9* 2.0*   LABGLOM 29* 27* 26*   GFRAA 35* 33* 31*           Assessment/Plan:    SHAUNNA/CKD stage 3.  - Etiology: ATN (hypotension; contrast dye) from last admission  - Data: SCr peaked at 2.0, improved to 1.2mg/dL but now 2.0. Weight is down, still with swelling  - Inotrope added on 8/24/20.     CHF - acute on chronic  - On Dobutamine drip. - 2 liter/day fluid restriction  - Strict I's/O's, daily weights   - CardioMEMS monitoring per Cardiology     Recent Perinephric hematoma. - Complication of L renal artery angioplasty / stent placement  - S/P coil embolization of subsegmental L renal artery  - Stable in size based on CT this admission     L Renal Artery Stenosis. - S/P L renal artery angioplasty / stent placement  - BP satisfactory. - Follow for late recurrence of HTN (post-perinephric bleed) (I.e, Page kidney).     Anemia - blood loss.   - Hgb stabilized in 8-9 range now  - S/P transfusion 2 units PRBC's last admission, on oral Fe.     Hyponatremia  - Has been present consistently since 4/20   - Related to volume excess / CHF          Hypokalemia.  - Stable on K replacement to 40 meq TID    PLAN:  - Defer to cardiology on transition to torsemide based on CardioMEMs  - Seems to be making progress, BUN is down slightly which indicates better renal perfusion   - Will give a dose of tolvaptan today for hypervolemic hyponatremia  - Follow labs and electrolytes           Please do not hesitate to contact me at [phone] if with questions. Thank you!     Demetris Cohen MD  8/27/2020  [facility]

## 2020-08-27 NOTE — PROGRESS NOTES
Brief Pre-Op Note/Sedation Assessment      Marguerite Bautista  1965  Cath Pool Rm/NONE      [id number]  1:43 PM    Planned Procedure: Cardiac Catheterization Procedure    Post Procedure Plan: Return to same level of care    Consent: I have discussed with the patient and/or the patient representative the indication, alternatives, and the possible risks and/or complications of the planned procedure and the anesthesia methods. The patient and/or patient representative appear to understand and agree to proceed. DISCUSSION OF CARDIAC CATHETERIZATION PROCEDURES: The procedures, indications, risks and alternatives have been discussed with the patient and, as appropriate, with the patient's guardian . Risks discussed included, but are not limited to, bleeding, development of blood clots/emboli, damage to blood vessels, renal failure, malignant cardiac arrhythmias, stroke, heart attack, emergent coronary bypass surgery, death, dye allergy. The patient (and guardian as appropriate) expressed understanding of the aforementioned and wished to proceed. Chief Complaint: Dyspnea, nstemi      Indications for Cath Procedure:  ACS > 24 hrs and Cardiomyopathy  Anginal Classification within 2 weeks:  CCS IV - Inability to perform any activity without angina or angina at rest, i.e., severe limitation  NYHA Heart Failure Class within 2 weeks: Class IV - Symptoms of HF at rest, Newly Diagnosed? No, Heart Failure Type: Systolic  Is Cath Lab Visit Valve-related?: No  Surgical Risk: N/A  Functional Type: N/A    Anti- Anginal Meds within 2 weeks:   Yes: Aspirin and Statin (Any)    Stress or Imaging Studies Performed (within 6 months):  Stress Test with SPECT Result: Positive:  lateral Risk/Extent of Ischemia:  High     Vital Signs:  /79   Pulse 93   Temp 98 °F (36.7 °C) (Oral)   Resp 20   Ht 5' 4.5\" (1.638 m)   Wt 240 lb 8 oz (109.1 kg)   LMP 10/24/2012   SpO2 97%   BMI 40.64 kg/m²     Allergies:   Allergies BILATERAL INTERCOSTAL NERVE BLOCK, STERNAL PLATING performed by Shay Mo MD at [address] Right 5/16/2019    fem-pop, performed by Ying Ceja MD at [address]  02/14/2019    CardioMEMs insertion for CHF    KIDNEY SURGERY      ROTATOR CUFF REPAIR Left 04/22/2016    TONSILLECTOMY      TRANSESOPHAGEAL ECHOCARDIOGRAM  01/26/2020    TRANSLUMINAL ANGIOPLASTY Left 10/08/2019    with stenting, at [facility]    [address] Left 04/29/2020    of the [facility] re-occlusion    TUMOR EXCISION      benign tumors X 2 chest & axilla    UPPER GASTROINTESTINAL ENDOSCOPY  4/25/2013    BX barretts, HH, gastritis    UPPER GASTROINTESTINAL ENDOSCOPY  12/10/2015    UPPER GASTROINTESTINAL ENDOSCOPY  01/06/2017    Gastritis    VASCULAR SURGERY Left 12/08/2015    upper extremity and mesenteric angiogram (diagnostic only)    VASCULAR SURGERY Bilateral 07/07/2020    diagnostic lower extremity angiogram only         Medications:  Current Facility-Administered Medications   Medication Dose Route Frequency Provider Last Rate Last Dose    potassium bicarb-citric acid (EFFER-K) effervescent tablet 40 mEq  40 mEq Oral BID Benjidarling Guillen APRN - CNP   40 mEq at 08/26/20 2133    DOBUTamine (DOBUTREX) 500 mg in dextrose 5 % 250 mL infusion  2.5 mcg/kg/min Intravenous Continuous Benji Mindy APRN - CNP 8.6 mL/hr at 08/27/20 1137 2.5 mcg/kg/min at 08/27/20 1137    morphine (PF) injection 2 mg  2 mg Intravenous V0L PRN Kristine Robertson, APRN - CNP   2 mg at 08/27/20 0828    ondansetron (ZOFRAN) injection 4 mg  4 mg Intravenous Q6H PRN Thai Saavedra MD   4 mg at 08/26/20 1014    magnesium hydroxide (MILK OF MAGNESIA) 400 MG/5ML suspension 30 mL  30 mL Oral Daily PRN Rosa M Oconnell APRN - CNP   30 mL at 08/22/20 2059    docusate sodium (COLACE) capsule 100 mg  100 mg Oral Daily Brandie Hardy MD   100 mg at 08/26/20 0957    perflutren lipid microspheres (DEFINITY) injection 1.65 mg  1.5 mL Intravenous ONCE PRN Durward Mortimer, MD        traMADol Grubbs Brinks) tablet 50 mg  50 mg Oral Q6H PRN Monalisa Bo MD   50 mg at 08/25/20 1433    ARIPiprazole (ABILIFY) tablet 10 mg  10 mg Oral Daily Monalisa Bo MD   10 mg at 08/26/20 0957    aspirin EC tablet 81 mg  81 mg Oral Daily Monalisa Bo MD   81 mg at 08/27/20 0930    benztropine (COGENTIN) tablet 1 mg  1 mg Oral Nightly Monalisa Bo MD   1 mg at 08/26/20 2123    buprenorphine-naloxone (SUBOXONE) 8-2 MG SL film 1 Film  1 Film Sublingual BID Monalisa Bo MD        clopidogrel (PLAVIX) tablet 75 mg  75 mg Oral Daily Monalisa Bo MD   75 mg at 08/27/20 0930    ferrous sulfate (IRON 325) tablet 325 mg  325 mg Oral BID  Monalisa Bo MD   325 mg at 08/26/20 1717    lamoTRIgine (LAMICTAL) tablet 200 mg  200 mg Oral BID Monalisa Bo MD   200 mg at 08/27/20 0930    magnesium oxide (MAG-OX) tablet 400 mg  400 mg Oral Daily Monalisa Bo MD   400 mg at 08/26/20 0957    midodrine (PROAMATINE) tablet 5 mg  5 mg Oral BID  Monalisa Bo MD   5 mg at 08/27/20 0930    pantoprazole (PROTONIX) tablet 40 mg  40 mg Oral BID Monalisa Bo MD   40 mg at 08/26/20 1548    spironolactone (ALDACTONE) tablet 50 mg  50 mg Oral Daily Monalisa Bo MD   50 mg at 08/27/20 0930    tiotropium (SPIRIVA RESPIMAT) 2.5 MCG/ACT inhaler 2 puff  2 puff Inhalation Daily Monalisa Bo MD   2 puff at 08/27/20 0738    traZODone (DESYREL) tablet 100 mg  100 mg Oral Nightly PRN Monalisa Bo MD   100 mg at 08/25/20 0014    glucose (GLUTOSE) 40 % oral gel 15 g  15 g Oral PRN Monalisa Bo MD        dextrose 50 % IV solution  12.5 g Intravenous PRN Monalisa Bo MD        glucagon (rDNA) injection 1 mg  1 mg Intramuscular PRN Monalisa Bo MD        dextrose 5 % solution  100 mL/hr Intravenous PRN Monalisa Bo MD        insulin lispro (HUMALOG) injection vial 0-12 Units  0-12 Units Subcutaneous TID ANTHONY HOLDEN MD [place]   2 Units at 08/25/20 1720    insulin lispro (HUMALOG) injection vial 0-6 Units  0-6 Units Subcutaneous Nightly Lisy Barrow MD   1 Units at 08/25/20 2020    busPIRone (BUSPAR) tablet 30 mg  30 mg Oral BID Lisy Barrow MD   30 mg at 08/26/20 2124    insulin glargine (LANTUS) injection vial 18 Units  18 Units Subcutaneous Nightly Lisy Barrow MD   18 Units at 08/26/20 2127    atorvastatin (LIPITOR) tablet 80 mg  80 mg Oral Nightly Lisy Barrow MD   80 mg at 08/26/20 2123    sodium chloride flush 0.9 % injection 10 mL  10 mL Intravenous 2 times per day Lisy Barrow MD   10 mL at 08/26/20 1016    sodium chloride flush 0.9 % injection 10 mL  10 mL Intravenous PRN Lisy Barrow MD   10 mL at 08/22/20 0641    acetaminophen (TYLENOL) tablet 650 mg  650 mg Oral Q6H PRN Lisy Barrow MD   650 mg at 08/26/20 2320    Or    acetaminophen (TYLENOL) suppository 650 mg  650 mg Rectal Q6H PRN Lisy Barrow MD        polyethylene glycol (GLYCOLAX) packet 17 g  17 g Oral Daily PRN Lisy Barrow MD   17 g at 08/22/20 2059    albuterol (PROVENTIL) nebulizer solution 2.5 mg  2.5 mg Nebulization Q4H PRN Lisy Barrow MD               Pre-Sedation:    Pre-Sedation Documentation and Exam:  I have personally completed a history, physical exam & review of systems for this patient (see notes). Prior History of Anesthesia Complications:   none    Modified Mallampati:  III (soft palate, base of uvula visible)    ASA Classification:  Class 3 - A patient with severe systemic disease that limits activity but is not incapacitating      Sacrlett Scale:   Activity:  2 - Able to move 4 extremities voluntarily on command  Respiration:  2 - Able to breathe deeply and cough freely  Circulation:  2 - BP+/- 20mmHg of normal  Consciousness:  2 - Fully awake  Oxygen Saturation (color):  1 - Needs oxygen to maintain oxygen saturation >90%    Sedation/Anesthesia Plan:  Guard the patient's safety and welfare. Minimize physical discomfort and pain. Minimize negative psychological responses to treatment by providing sedation and analgesia and maximize the potential amnesia. Patient to meet pre-procedure discharge plan.     Medication Planned:  midazolam intravenously and fentanyl intravenously    Patient is an appropriate candidate for plan of sedation: yes      Electronically signed by Jessica Arreguin MD on 8/27/2020 at 1:43 PM

## 2020-08-27 NOTE — PROGRESS NOTES
Patient's EF (Ejection Fraction) is less than 40%     Heart Failure Medications:  · Diuretics[de-identified] Furosemide and Spironolactone     · (One of the following REQUIRED for EF <40%/SYSTOLIC FAILURE but MAY be used in EF% >40%/DIASTOLIC FAILURE)        ACE[de-identified] None        ARB[de-identified] None         ARNI[de-identified] None     (Beta Blockers)  · NON- Evidenced Based Beta Blocker (for EF% >40%/DIASTOLIC FAILURE): None     · Evidenced Based Beta Blocker::(REQUIRED for EF% <40%/SYSTOLIC FAILURE) None  . .................................................................................................................................................. Patient's weights and intake/output reviewed: Yes     Patient's Last Weight: 245 lbs obtained by standing scale.  Difference of 4 lbs less than last documented weight.       Intake/Output Summary (Last 24 hours) at 8/26/2020 2218  Last data filed at 8/26/2020 2211      Gross per 24 hour   Intake 1821.6 ml   Output 5625 ml   Net -3803.4 ml        Comorbidities Reviewed Yes     Patient has a past medical history of Acute kidney injury ([address] Acute on chronic congestive heart failure ([address] Alcohol dependence ([address] Bipolar 1 disorder ([address] CAD (coronary artery disease), Cardiomyopathy ([address] Cellulitis, CHF (congestive heart failure) ([address] COPD (chronic obstructive pulmonary disease) ([address] Diabetic ulcer of left foot associated with type 2 diabetes mellitus ([address] Diabetic ulcer of toe of right foot associated with type 2 diabetes mellitus ([address] GERD (gastroesophageal reflux disease), High cholesterol, HTN (hypertension), Kidney disease, chronic, stage II (mild, EGFR 60+ ml/min), MI (myocardial infarction) ([facility] Positive FIT (fecal immunochemical test), Pulmonary nodule, and PVD (peripheral vascular disease) ([facility]      >>For CHF and Comorbidity documentation on Education Time and Topics, please see Education Tab     Progressive Mobility Assessment:  What is this patient's Current Level of Mobility?: Ambulatory- with Assistance  How was this patient Mobilized today?: Edge of Bed, Up to Chair, and Up in Room                                                             With Whom? Nurse and PCA                                                             Level of Difficulty/Assistance: 1x Assist      Pt resting in bed at this time on room air. Pt denies shortness of breath. Pt with pitting lower extremity edema.      Patient and/or Family's stated Goal of Care this Admission: increase activity tolerance, be more comfortable, and reduce lower extremity edema prior to discharge       Diabetes education provided today:    Carbs: good carbs and bad carbs, importance of carb counting, incorporation of protein with each meal to reduce Glycemic index, importance of portions, Carb/insulin ratio. Managing high and low sugar readings. Rotation of sites for subcutaneous medication injection.

## 2020-08-27 NOTE — PLAN OF CARE
Patient's EF (Ejection Fraction) is less than 40%    Heart Failure Medications:   Diuretics[de-identified] Furosemide and Spironolactone     (One of the following REQUIRED for EF <40%/SYSTOLIC FAILURE but MAY be used in EF% >40%/DIASTOLIC FAILURE)        ACE[de-identified] None        ARB[de-identified] None         ARNI[de-identified] None    (Beta Blockers)   NON- Evidenced Based Beta Blocker (for EF% >40%/DIASTOLIC FAILURE): None     Evidenced Based Beta Blocker::(REQUIRED for EF% <40%/SYSTOLIC FAILURE) None  . .................................................................................................................................................. Patient's weights and intake/output reviewed: Yes    Patient's Last Weight: 245 lbs obtained by standing scale. Difference of 4 lbs less than last documented weight. Intake/Output Summary (Last 24 hours) at 8/26/2020 2218  Last data filed at 8/26/2020 2211  Gross per 24 hour   Intake 1821.6 ml   Output 5625 ml   Net -3803.4 ml       Comorbidities Reviewed Yes    Patient has a past medical history of Acute kidney injury ([address] Acute on chronic congestive heart failure ([address] Alcohol dependence ([address] Bipolar 1 disorder ([address] CAD (coronary artery disease), Cardiomyopathy ([address] Cellulitis, CHF (congestive heart failure) ([address] COPD (chronic obstructive pulmonary disease) ([address] Diabetic ulcer of left foot associated with type 2 diabetes mellitus ([address] Diabetic ulcer of toe of right foot associated with type 2 diabetes mellitus ([address] GERD (gastroesophageal reflux disease), High cholesterol, HTN (hypertension), Kidney disease, chronic, stage II (mild, EGFR 60+ ml/min), MI (myocardial infarction) ([facility] Positive FIT (fecal immunochemical test), Pulmonary nodule, and PVD (peripheral vascular disease) ([facility]      >>For CHF and Comorbidity documentation on Education Time and Topics, please see Education Tab    Progressive Mobility Assessment:  What is this patient's Current Level of Mobility?: Ambulatory- with Assistance  How was this patient Mobilized today?: Edge of Bed, Up to Chair, and Up in Room                 With Whom? Nurse and PCA                 Level of Difficulty/Assistance: 1x Assist     Pt resting in bed at this time on room air. Pt denies shortness of breath. Pt with pitting lower extremity edema. Patient and/or Family's stated Goal of Care this Admission: increase activity tolerance, be more comfortable, and reduce lower extremity edema prior to discharge      Diabetes education provided today:    Carbs: good carbs and bad carbs, importance of carb counting, incorporation of protein with each meal to reduce Glycemic index, importance of portions, Carb/insulin ratio. Managing high and low sugar readings. Rotation of sites for subcutaneous medication injection.       Carole Victoria

## 2020-08-27 NOTE — PROGRESS NOTES
Patient back from cath lab, cmu aware. Sheath pulled from right groin in cath lab, site has slight bloody drainage. Patient flat until 9:45 pm (6 hours). Family member at bedside. No intervention.

## 2020-08-27 NOTE — PROGRESS NOTES
Pt did not want to get cleaned up before bed. Pt stated she would get cleaned up tomorrow morning. Informed patient on the importance of roche care to prevent CAUTI's. Used roche wipes for roche care. Patient verbalized understanding.

## 2020-08-27 NOTE — PROGRESS NOTES
[phone] Hospital or Facility: [facility] From: Eddie Israel RE: Sheree Dewitt katja 1965 RM: [id number] patient is post cath, flat until 2145, her 2 mg morphine IV is not due until 2030, she is having a lot of pain laying flat, would you be able to give her an additional 1x dose of her morphine? ? thank you Need Callback: NO CALLBACK REQ C4 PROGRESSIVE CARE      Sent to cross cover via perfect serve.

## 2020-08-27 NOTE — PROCEDURES
CARDIAC CATHETERIZATION REPORT     Procedure Date:  2020  Patient Name: Noble Libman  MRN: [id number] : 1965      INDICATION     Cardiomyopathy  Acute systolic heart failure  Non-STEMI    PROCEDURES PERFORMED     Right heart catheterization  Left heart catheterization  Coronary angiogam  Coronary cath  Nonselective LIMA angiogram  Left subclavian          PROCEDURE DESCRIPTION   Risks/benefits/alternatives/outcomes were discussed with patient and/or family and informed consent was obtained. Right radial artery was palpated and assessed by ultrasound was not felt to be viable for cannulation and as such attention turned towards the right groin where local anesthetic was applied. Using micropuncture kit and ultrasound guidance as well as fluoroscopic guidance, a 4/5 Terumo slender sheath was inserted into the right common femoral artery and a 7 Serbian sheath was inserted into the right common femoral vein. Standard PA catheter was used for hemodynamic assessment for right heart catheterization and this was removed at the end of the case. 5 Serbian pigtail was used for left heart catheterization, LV gram was deferred due to renal insufficiency. 5 Western Evelin JL4 and 3 RC catheters were used for diagnostic coronary angiograms as well as for left subclavian and nonselective LIMA angiograms. At the conclusion of the procedure, a TR band was placed over the puncture site and hemostasis was obtained. There were no immediate complications. I supervised sedation with versed 2 mg/fentanyl 100 Mcg during the procedure. 50 cc contrast was utilized. <20cc EBL. At end of procedure, CardioMEMS device was interrogated and it showed good correlation with Lenora numbers. As such, lines were pulled at end of case.       FINDINGS     HEMODYNAMICS    CHAMBER PRESSURE SATURATION   RA  15  54 %   RV  62/16    PA  69/20  51 %   PW  19 with V waves to 25    AORTA  118/63  94 %     HERMILA CARDIAC OUTPUT  5.4 L/min SVR  1075 L/min   PVR  343 L/min     Left subclavian angiogram shows proximal 95 to 99% stenosis. There appears to be retrograde filling of the left subclavian artery by way of the LIMA and there appears to be coronary steal.  The vertebral arteries not well visualized. Left heart catheterization    LVEDP  22   GRADIENT ACROSS AORTIC VALVE  none       CORONARY ARTERIES    LM  Proximal less than 10% stenosis, mid 10 to 20% stenosis, distal 70% stenosis. Overall similar appearance to prior angiograms       LAD  Ostial 70% stenosis, the LAD is extensively stented in the proximal and mid segments, the stents appear to be widely patent with 20% in-stent restenosis, distal vessel has less than 10% stenosis. There is retrograde filling into the LIMA to LAD graft that fills the left subclavian artery. D1 is a small vessel with ostial 90% stenosis. Overall similar appearance to prior angiograms       LCX  Circumflex is stented throughout the proximal and mid and distal segments into OM 1 which is a large vessel. Stents are widely patent with 10 to 20% in-stent restenosis. The stents jailed the AV groove circumflex and at the mid AV groove circumflex, there is a 70 to 80% stenosis. RCA Dominant, medium vessel, heavily calcified, proximal-mid 60 to 70% stenosis, distal less than 10% stenosis, overall similar angiographically as compared to prior angiograms. BYPASS GRAFTS    LIMA-LAD  Visualized nonselectively through retrograde filling from the native LAD, it is not visualized with antegrade flow from the left subclavian artery injection it appears to be widely patent with less than 10% [id number] stenosis. CONCLUSIONS:     Elevated filling pressures, continue diuresis  CardioMEMS had good correlation with the right heart catheterization numbers.   Severe pulmonary hypertension  Patent LIMA to LAD graft however there appears to be coronary steal phenomenon due to severe left subclavian stenosis  We will consider left subclavian intervention  Will obtain follow-up vascular ultrasound to assess this  We will consult with CT surgery regarding therapeutic options

## 2020-08-27 NOTE — PROGRESS NOTES
[address]   Daily Progress Note    Admit Date:  8/18/2020  HPI:  No chief complaint on file. Interval history:   Presented with right sided abdominal pain radiating to right chest, associated with shortness of breath, weight gain and swelling. Troponin and BNP elevated.      Hx of CAD s/p PCI to LAD and LCx 2018, CABG X1 (LIMA to LAD,Jan 2020 for restenosis of LAD and LCx and significant dz in RCA). Hx of ICM, sCHF s/p CardioMEMs implant (2/2019), MR, PAD, CKD, DM, COPD, leg wounds.       She was recently admitted following left renal artery angioplasty and stenting complicated by perinephric hematoma requiring coil embolization of bleeding vessel. Subjective:  Ms. Lucia Torres having shortness of breath. Continues with edema but improving slowly. Weight is down. UOP continues to be significant.      Objective:   /69   Pulse 92   Temp 98.5 °F (36.9 °C) (Oral)   Resp 20   Ht 5' 4.5\" (1.638 m)   Wt 240 lb 8 oz (109.1 kg)   LMP 10/24/2012   SpO2 95%   BMI 40.64 kg/m²       Intake/Output Summary (Last 24 hours) at 8/27/2020 3074  Last data filed at 8/27/2020 0830  Gross per 24 hour   Intake 1602.6 ml   Output 4700 ml   Net -3097.4 ml       NYHA: IV  Tele NSR  Physical Exam:  General:  Awake, alert, NAD  Skin:  Warm and dry, jaundice  Neck:  JVD difficult to assess due to body habitus  Chest:  Dim to auscultation, no wheezes/rhonchi/rales  Cardiovascular:  RRR S1S2, no m/r/g   Abdomen:  Soft, nontender, +bowel sounds, + flank edema- softer  Extremities:  ++ BLE  edema    Medications:    tolvaptan  30 mg Oral Once    potassium bicarb-citric acid  40 mEq Oral BID    docusate sodium  100 mg Oral Daily    ARIPiprazole  10 mg Oral Daily    aspirin EC  81 mg Oral Daily    benztropine  1 mg Oral Nightly    buprenorphine-naloxone  1 Film Sublingual BID    clopidogrel  75 mg Oral Daily    ferrous sulfate  325 mg Oral BID WC    lamoTRIgine  200 mg Oral BID    magnesium oxide  400 mg Oral Daily    midodrine  5 mg Oral BID     pantoprazole  40 mg Oral BID    spironolactone  50 mg Oral Daily    tiotropium  2 puff Inhalation Daily    insulin lispro  0-12 Units Subcutaneous TID     insulin lispro  0-6 Units Subcutaneous Nightly    busPIRone  30 mg Oral BID    insulin glargine  18 Units Subcutaneous Nightly    atorvastatin  80 mg Oral Nightly    sodium chloride flush  10 mL Intravenous 2 times per day      DOBUTamine 2.5 mcg/kg/min (08/26/20 2313)    dextrose         Lab Data:  CBC:   Recent Labs     08/25/20  0431   WBC 7.9   HGB 9.7*        BMP:    Recent Labs     08/25/20  0431 08/26/20  0445 08/27/20  0410   * 130* 129*   K 3.4* 3.0* 3.7   CO2 28 29 31   BUN 50* 47* 44*   CREATININE 1.8* 1.9* 2.0*     INR:  No results for input(s): INR in the last 72 hours. BNP:    No results for input(s): PROBNP in the last 72 hours. Lab Results   Component Value Date    LVEF 38 01/24/2020     STRESS MPI 8/22/20:    Summary    Calculated LVEF is not reliable on study which is technically difficult, estimate of 51% is likely an overestimate     Mid-distal anteroseptal/apical akinesis    Large fixed defects in anteroseptal/apical walls    Mild to moderate reversibility in lateral walls consistent with ischemia    Overall, this would be considered an abnormal high risk study      LIMITED ECHO 8/19/20:    Summary   Limited only f/u for LVEF and mitral regurgitation. Technically difficult examination. The left ventricular systolic function is severely reduced with an ejection fraction of 25%. There is hypokinesis of the apex, apical lateral, apical septum, anterioseptum and anterior walls. Compared to previous study from 7-1-2020 no changes noted in left ventricular function. Moderate mitral regurgitation. LIMITED ECHO 6/29/20:    Summary   Technically difficult examination. Limited only f/u for LVEF. and mitral regurgitation.    The left ventricular systolic function is Normal left ventricular diastolic filling pressure. Mild mitral regurgitation. Systolic pulmonary artery pressure (SPAP) estimated at 32mmHg (RA pressure 3mmHg). [facility] 3/26/18 [employer] promus premier [id number] CCx and [id number] CCx. Active Problems:    Acute on chronic combined systolic and diastolic congestive heart failure (HCC)    Right upper quadrant pain    Acute on chronic right-sided heart failure (HCC)  Resolved Problems:    * No resolved hospital problems.  *      Assessment/Plan:  · CHF, acute on chronic combined and right heart failure - remains fluid overloaded  · Cardiomyopathy, ischemic - EF 25% by limited echo, unchanged; not on ACEi/ARB/ARNI due to hypotension and CKD  · CAD s/p PCI's and CABG - stress test with lateral reversible ischemia, CABG to LAD, LCx and RCA still with disease, on high dose statin, DAPT  · Hypotension - on midodrine, stable  · HLD   · CKD3  · DM  · jaundice    Plan:   Aspirin and statin  D/c lasix infusion today   Continue Spironolactone (aldactone)  On midodrine 5mg BID  Unable to start beta blocker due to hypotension  Unable to start ACEi/ARB/ARNI due to hypotension and CKD  Dobutamine 2.5mcg/kg/min started 8/24/20  [facility] today - discussed risks, benefits- patient agreeable to proceed  Daily weights, daily BMP, add on ammonia     cardiomems reading         Art Lopez CNP, 8/27/2020, 8:37 AM

## 2020-08-27 NOTE — PROGRESS NOTES
Patient's EF (Ejection Fraction) is less than 40%    Heart Failure Medications:   Diuretics[de-identified] Spironolactone     (One of the following REQUIRED for EF <40%/SYSTOLIC FAILURE but MAY be used in EF% >40%/DIASTOLIC FAILURE)        ACE[de-identified] None        ARB[de-identified] None         ARNI[de-identified] None    (Beta Blockers)   NON- Evidenced Based Beta Blocker (for EF% >40%/DIASTOLIC FAILURE): None     Evidenced Based Beta Blocker::(REQUIRED for EF% <40%/SYSTOLIC FAILURE) None  . .................................................................................................................................................. Patient's weights and intake/output reviewed: Yes    Patient's Last Weight: 240.8  lbs obtained by standing scale. Difference of 4.5 lbs less than last documented weight. Intake/Output Summary (Last 24 hours) at 8/27/2020 1447  Last data filed at 8/27/2020 1137  Gross per 24 hour   Intake 1337.82 ml   Output 4100 ml   Net -2762.18 ml       Comorbidities Reviewed Yes    Patient has a past medical history of Acute kidney injury ([address] Acute on chronic congestive heart failure ([address] Alcohol dependence ([address] Bipolar 1 disorder ([address] CAD (coronary artery disease), Cardiomyopathy ([address] Cellulitis, CHF (congestive heart failure) ([address] COPD (chronic obstructive pulmonary disease) ([address] Diabetic ulcer of left foot associated with type 2 diabetes mellitus ([address] Diabetic ulcer of toe of right foot associated with type 2 diabetes mellitus ([address] GERD (gastroesophageal reflux disease), High cholesterol, HTN (hypertension), Kidney disease, chronic, stage II (mild, EGFR 60+ ml/min), MI (myocardial infarction) ([address] Positive FIT (fecal immunochemical test), Pulmonary nodule, and PVD (peripheral vascular disease) ([facility]      >>For CHF and Comorbidity documentation on Education Time and Topics, please see Education Tab    Progressive Mobility Assessment:  What is this patient's Current Level of Mobility?: Ambulatory- with Assistance  How was this patient Mobilized today?:up to toilet, edge of bed                 With Whom? Nurse, pca                 Level of Difficulty/Assistance: 1x Assist     Pt resting in bed at this time on room air. Pt denies shortness of breath. Pt with pitting lower extremity edema.      Patient and/or Family's stated Goal of Care this Admission: reduce shortness of breath, increase activity tolerance, better understand heart failure and disease management, be more comfortable and reduce lower extremity edema prior to discharge        :

## 2020-08-27 NOTE — PROGRESS NOTES
Physical Therapy  Facility/Department: [facility] C4 PCU  Daily Treatment Note  NAME: Leroy Luis  : 1965  MRN: [id number]    Date of Service: 2020    Discharge Recommendations:  Home with assist PRN, Home with Home health PT   PT Equipment Recommendations  Equipment Needed: No    Assessment   Body structures, Functions, Activity limitations: Decreased functional mobility ; Decreased strength;Decreased balance; Increased pain  Assessment: Pt has met all therapy goals and is Mod IND to IND for all mobility and ambulating community distances without AD IND. Pt displays some gait deviations that are likely present at baseline, but no signs of unsteadiness. Will d/c from acute PT given pt is at baseline. Recommend home with PRN assist and HHPT to maximize IND and activity tolerance upon d/c.  PT Education: Goals;PT Role;Plan of Care;Disease Specific Education;General Safety  Patient Education: Pt verbalizes understanding  REQUIRES PT FOLLOW UP: No  Activity Tolerance  Activity Tolerance: Patient Tolerated treatment well     Patient Diagnosis(es): There were no encounter diagnoses. has a past medical history of Acute kidney injury ([address] Acute on chronic congestive heart failure ([address] Alcohol dependence ([address] Bipolar 1 disorder ([address] CAD (coronary artery disease), Cardiomyopathy ([address] Cellulitis, CHF (congestive heart failure) ([address] COPD (chronic obstructive pulmonary disease) ([address] Diabetic ulcer of left foot associated with type 2 diabetes mellitus ([address] Diabetic ulcer of toe of right foot associated with type 2 diabetes mellitus ([address] GERD (gastroesophageal reflux disease), High cholesterol, HTN (hypertension), Kidney disease, chronic, stage II (mild, EGFR 60+ ml/min), MI (myocardial infarction) ([address] Positive FIT (fecal immunochemical test), Pulmonary nodule, and PVD (peripheral vascular disease) ([address] has a past surgical history that includes Tonsillectomy;  Cholecystectomy; tumor excision; Upper gastrointestinal endoscopy (4/25/2013); Upper gastrointestinal endoscopy (12/10/2015); Upper gastrointestinal endoscopy (01/06/2017); Arterial bypass surgry (Left, 01/06/2016); Cardiac catheterization (03/27/2018); Coronary angioplasty with stent (03/16/2018); Rotator cuff repair (Left, 04/22/2016); Coronary angioplasty with stent (01/03/2018); Insertable Cardiac Monitor (02/14/2019); femoral bypass (Right, 5/16/2019); transluminal angioplasty (Left, 10/08/2019); Cardiac catheterization (01/20/2020); Coronary artery bypass graft (N/A, 1/27/2020); vascular surgery (Left, 12/08/2015); transluminal angioplasty (Left, 04/29/2020); vascular surgery (Bilateral, 07/07/2020); Coronary angioplasty with stent (10/07/2019); transesophageal echocardiogram (01/26/2020); and Kidney surgery. Restrictions  Restrictions/Precautions  Restrictions/Precautions: General Precautions, Fall Risk  Position Activity Restriction  Other position/activity restrictions: High fall risk per nursing assessment, up with assistance, MAYA Lees     Subjective   General  Chart Reviewed: Yes  Response To Previous Treatment: Patient with no complaints from previous session. Family / Caregiver Present: No  Subjective  Subjective: Pt sitting EOB, initially declines PT because she does not want to be fatigued before her cath procedure. With mild encouragement she is agreeable to PT.   General Comment  Comments: RN cleared pt for session  Pain Screening  Patient Currently in Pain: Yes  Pain Assessment  Pain Assessment: 0-10  Pain Level: 8  Pain Type: Chronic pain  Pain Location: Abdomen  Vital Signs  Patient Currently in Pain: Yes       Orientation  Orientation  Overall Orientation Status: Within Functional Limits       Objective   Bed mobility  Supine to Sit: Unable to assess(Pt sitting up at EOB to start and end session)  Sit to Supine: Unable to assess     Transfers  Sit to Stand: Modified independent  Stand to sit: Modified independent Ambulation  Ambulation?: Yes  Ambulation 1  Surface: level tile  Device: No Device  Assistance: Independent  Quality of Gait: Pt amb with slow tamir with widened GABINO and increased degree of med<>lat postural sway during stance phase. Pt steady despite slow pace; no LOB. Gait Deviations: Slow Tamir; Increased GABINO; Decreased step length;Decreased step height  Distance: 175'  Stairs/Curb  Stairs?: No        Exercises  Hip Flexion: seated marches X 20 BLE  Knee Long Arc Quad: X 20 BLE  Ankle Pumps: BLE x 20                        AM-PAC Score  AM-PAC Inpatient Mobility Raw Score : 24 (08/27/20 1127)  AM-PAC Inpatient T-Scale Score : 61.14 (08/27/20 1127)  Mobility Inpatient CMS 0-100% Score: 0 (08/27/20 1127)  Mobility Inpatient CMS G-Code Modifier : CH (08/27/20 1127)          Goals  Short term goals  Time Frame for Short term goals: 1 week, 8/26/20 (unless otherwise specified)  Short term goal 1: Pt will transfer supine <-> sit with modified(I) -- 8/5 met  Short term goal 2: Pt will transfer sit <-> stand and bed>chair with modified(I) -- 8/25 met  Short term goal 3: Pt will ambulate x 200 feet without AD with supervision/modified(I) -- 8/25 met  Short term goal 4: By 8/21/20: Pt will tolerate 12-15 reps BLE exercise for strengthening, balance, and endurance -- 8/20: GOAL MET: x 15 reps seated BLE exercises  Short term goal 5: Pt will ambulate up/down 4 steps using B handrails with SBA -- 8/25 met  Patient Goals   Patient goals :  \"To walk better and go home\"    Plan    Plan  Times per week: last PT treat 8/27  Times per day: Daily  Specific instructions for Next Treatment: Progress ther ex and mobility as tolerated, assess stair amb prior to D/C home  Current Treatment Recommendations: Strengthening, Balance Training, Functional Mobility Training, Transfer Training, Gait Training, Home Exercise Program, Safety Education & Training, Pain Management, Stair training  Safety Devices  Type of devices: Call light within reach, Left in bed, Nurse notified, All fall risk precautions in place, Patient at risk for falls(Pt sitting EOB at end of session)     Therapy Time   Individual Concurrent Group Co-treatment   Time In 0950         Time Out 1013         Minutes 23         Timed Code Treatment Minutes: 23 Minutes       Francis Swenson, PT, DPT

## 2020-08-27 NOTE — CARE COORDINATION
Chart review LOS day #9-    Per Cardiology:  Ms. Jerrald Romberg having shortness of breath. Continues with edema but improving slowly. Weight is down. UOP continues to be significant. [facility] today - discussed risks, benefits- patient agreeable to proceed  Daily weights, daily BMP, add on ammonia     D/c lasix infusion today     Per Nephrology:  PLAN:  - Defer to cardiology on transition to torsemide based on CardioMEMs  - Seems to be making progress, BUN is down slightly which indicates better renal perfusion   - Will give a dose of tolvaptan today for hypervolemic hyponatremia  - Follow labs and electrolytes     Patient is refusing home care . DCP monitoring for needs or barriers to discharge that arise and will assist as needed.

## 2020-08-27 NOTE — PROGRESS NOTES
Occupational Therapy    Patient declining therapy due to cath procedure today. Will follow up next date due to bed rest orders following cath.     Electronically signed by VEDA Navarrete/L on 8/27/2020 at 11:52 AM

## 2020-08-27 NOTE — PROGRESS NOTES
Left lateral foot and 2nd toe (left foot) painted with betadine (per wound care orders). Left leg wrapped with ace wrap (per wound care orders), right leg not wrapped at this time (post cath).

## 2020-08-27 NOTE — PROGRESS NOTES
Hospitalist Progress Note      PCP: Nicole العراقي PA-C    Date of Admission: 8/18/2020    Chief Complaint: RUQ pain    Hospital Course: The patient is a pleasant, chronically ill-appearing 62 Y F with a h/o COPD, HTN, HLD, DM2, CAD s/p stents and CABG, ischemic cardiomyopathy, and CHF.  She has extensive vascular disease and is s/p a LUE bypass, a LLE angioplasty (then again when it reoccluded), and a RLE bypass.  She continues to smoke.  She was recently diagnosed with L renal artery stenosis and underwent a L renal artery angioplasty and stent here on 8/4.  She was discharged home that day but returned to the ED a few hours later with L flank pain and was admitted with a L perinephric subcapsular hematoma.  She required 2u pRBCs and a coil embolization of a L renal artery branch on 8/5. Gissell Duckworth hospitalization was complicated by SHAUNNA and hyponatremia, and with the help of nephrology each of these issues improved a little by the time she was discharged on 8/12.               Now the patient presents with new sharp RUQ pain which radiates to her R posterior flank, R chest, and R shoulder.  It started around 8pm last night, and she denies having any pain in this area previously.  It hurts her badly to breath or move.  She is very tender in the RUQ.  She is s/p cholecystectomy.  Her LFTs have been newly abnormal for the last couple weeks. Subjective: Lasix gtt stopped today. Planning for left heart cath today. Net negative 19L. Cr 2 today. Hyponatremia noted.     Medications:  Reviewed    Infusion Medications    DOBUTamine 2.5 mcg/kg/min (08/26/20 3138)    dextrose       Scheduled Medications    potassium bicarb-citric acid  40 mEq Oral BID    docusate sodium  100 mg Oral Daily    ARIPiprazole  10 mg Oral Daily    aspirin EC  81 mg Oral Daily    benztropine  1 mg Oral Nightly    buprenorphine-naloxone  1 Film Sublingual BID    clopidogrel  75 mg Oral Daily    ferrous sulfate  325 mg Oral BID WC  lamoTRIgine  200 mg Oral BID    magnesium oxide  400 mg Oral Daily    midodrine  5 mg Oral BID     pantoprazole  40 mg Oral BID    spironolactone  50 mg Oral Daily    tiotropium  2 puff Inhalation Daily    insulin lispro  0-12 Units Subcutaneous TID     insulin lispro  0-6 Units Subcutaneous Nightly    busPIRone  30 mg Oral BID    insulin glargine  18 Units Subcutaneous Nightly    atorvastatin  80 mg Oral Nightly    sodium chloride flush  10 mL Intravenous 2 times per day     PRN Meds: morphine, ondansetron, magnesium hydroxide, perflutren lipid microspheres, traMADol, traZODone, glucose, dextrose, glucagon (rDNA), dextrose, sodium chloride flush, acetaminophen **OR** acetaminophen, polyethylene glycol, albuterol      Intake/Output Summary (Last 24 hours) at 8/27/2020 1014  Last data filed at 8/27/2020 0830  Gross per 24 hour   Intake 1602.6 ml   Output 4700 ml   Net -3097.4 ml       Physical Exam Performed:    /69   Pulse 92   Temp 98.5 °F (36.9 °C) (Oral)   Resp 20   Ht 5' 4.5\" (1.638 m)   Wt 240 lb 8 oz (109.1 kg)   LMP 10/24/2012   SpO2 95%   BMI 40.64 kg/m²     General appearance: No apparent distress, appears stated age and cooperative. HEENT: Pupils equal, round, and reactive to light. Conjunctivae/corneas clear. Neck: Supple, with full range of motion. No jugular venous distention. Trachea midline. Respiratory:  Normal respiratory effort. Clear to auscultation, bilaterally without Rales/Wheezes/Rhonchi. Cardiovascular: Regular rate and rhythm with normal S1/S2 without murmurs, rubs or gallops. Abdomen: Soft, non-tender, non-distended with normal bowel sounds. Musculoskeletal: No clubbing, cyanosis bilaterally. Full range of motion without deformity. 1+ BLE edema. Skin: Skin color, texture, turgor normal.  No rashes or lesions. Neurologic:  Neurovascularly intact without any focal sensory/motor deficits.  Cranial nerves: II-XII intact, grossly non-focal.  Psychiatric: Alert and oriented, thought content appropriate, normal insight  Capillary Refill: Brisk,< 3 seconds   Peripheral Pulses: +2 palpable, equal bilaterally       Labs:   Recent Labs     08/25/20  0431   WBC 7.9   HGB 9.7*   HCT 30.5*        Recent Labs     08/25/20  0431 08/26/20  0445 08/27/20  0410   * 130* 129*   K 3.4* 3.0* 3.7   CL 86* 83* 82*   CO2 28 29 31   BUN 50* 47* 44*   CREATININE 1.8* 1.9* 2.0*   CALCIUM 9.7 9.8 10.0   PHOS 3.3 2.6 2.8     Recent Labs     08/26/20 0445   AST 14*   ALT 8*   BILIDIR 2.1*   BILITOT 4.2*   ALKPHOS 244*     No results for input(s): INR in the last 72 hours. No results for input(s): Serene Yadkin in the last 72 hours. Urinalysis:      Lab Results   Component Value Date    NITRU Negative 08/18/2020    WBCUA 0-2 08/18/2020    BACTERIA Rare 08/18/2020    RBCUA 0-2 08/18/2020    BLOODU Negative 08/18/2020    SPECGRAV 1.010 08/18/2020    GLUCOSEU Negative 08/18/2020       Radiology:  US GALLBLADDER RUQ   Final Result   Status post cholecystectomy, with normal caliber common duct for the post   cholecystectomy state. Fatty liver. NM Cardiac Stress Test Nuclear Imaging   Final Result      NM LUNG SCAN PERFUSION ONLY   Final Result   Low probability for pulmonary embolism. Assessment/Plan:    Active Hospital Problems    Diagnosis    Acute on chronic right-sided heart failure ([facility] [I50.813]    Right upper quadrant pain [R10.11]    Acute on chronic combined systolic and diastolic congestive heart failure (HCC) [I50.43]     RUQ pain, suspect due to congestive hepatopathy, due to acute on chronic systolic CHF  - she had been on spironolactone and PRN metolazone.  Then last admission she was discharged with spironolactone and daily torsemide.    - Lasix gtt stopped 8/27.  - GI consulted and following.    - RUQ US unremarkable.     - Continue to trend LFTs  - has a history of alcoholism - sober since 2006 and on Suboxone. - limit narcotics if possible.     Acute on chronic combined systolic and diastolic heart failure with fluid overload, LVEF of 25%  - not on ACE inhibitor or ARB due to CKD. - has implanted CardioMems unit   - no ACE/ARB/ARNI due to unstable renal status.    - cardiology and nephrology consulted and following.  - daily weights, I&O.  - diuretics as above. Started on Dobutamine gtt 8/24, which she seems to be responding well to. CKD3  - baseline Cr is probably about 1.5   Monitor during diuresis.    - nephrology consulted and following.     Hyponatremia  - it looks like her baseline is in the high 120's, even when accounting for hyperglycemia.  After a week of aggressive management, including multiple doses of tolvaptan, she still was only discharged at 126 last time.    - nephrology input noted. - tolvaptan given 8/27.        CAD  - aspirin, clopidogrel.  OK to resume high dose statin for now. Monitor for worsening LFTs. - stress test was abnormal, so will need LHC at some point during her stay, possibly on Thursday.       DM2  - adjusted insulin regimen while here.  Recent A1c 7.2.     Anemia - due to blood loss. - 14.7 cm L perinephric subcapsular hematoma was stable on repeat CT on this admission.   - continued on iron. Hypokalemia - on BID replacement. - follow BMP. Morbid obesity - body mass index is 42.72 kg/m². Complicating assessment and treatment. Placing patient at risk for multiple co-morbidities as well as early death and contributing to the patient's presentation.  Counseled on weight loss    DVT Prophylaxis: SCDs  Diet: Diet NPO Effective Now  Code Status: Full Code    PT/OT Eval Status: not indicated    Dispo - likely another 2-3 days inpatient    TRAY Hyatt - CNP

## 2020-08-28 ENCOUNTER — TELEPHONE (OUTPATIENT)
Dept: CARDIOLOGY CLINIC | Age: 55
End: 2020-08-28

## 2020-08-28 ENCOUNTER — APPOINTMENT (OUTPATIENT)
Dept: VASCULAR LAB | Age: 55
DRG: 182 | End: 2020-08-28
Attending: INTERNAL MEDICINE
Payer: COMMERCIAL

## 2020-08-28 LAB
ALBUMIN SERPL-MCNC: 3.9 G/DL (ref 3.4–5)
ANION GAP SERPL CALCULATED.3IONS-SCNC: 18 MMOL/L (ref 3–16)
BUN BLDV-MCNC: 40 MG/DL (ref 7–20)
CALCIUM SERPL-MCNC: 9.7 MG/DL (ref 8.3–10.6)
CHLORIDE BLD-SCNC: 83 MMOL/L (ref 99–110)
CO2: 30 MMOL/L (ref 21–32)
CREAT SERPL-MCNC: 2.1 MG/DL (ref 0.6–1.1)
GFR AFRICAN AMERICAN: 29
GFR NON-AFRICAN AMERICAN: 24
GLUCOSE BLD-MCNC: 128 MG/DL (ref 70–99)
GLUCOSE BLD-MCNC: 162 MG/DL (ref 70–99)
GLUCOSE BLD-MCNC: 167 MG/DL (ref 70–99)
GLUCOSE BLD-MCNC: 173 MG/DL (ref 70–99)
GLUCOSE BLD-MCNC: 177 MG/DL (ref 70–99)
HCT VFR BLD CALC: 30.2 % (ref 36–48)
HEMOGLOBIN: 9.8 G/DL (ref 12–16)
MCH RBC QN AUTO: 27 PG (ref 26–34)
MCHC RBC AUTO-ENTMCNC: 32.5 G/DL (ref 31–36)
MCV RBC AUTO: 83 FL (ref 80–100)
PDW BLD-RTO: 26.5 % (ref 12.4–15.4)
PERFORMED ON: ABNORMAL
PHOSPHORUS: 3.2 MG/DL (ref 2.5–4.9)
PLATELET # BLD: 232 K/UL (ref 135–450)
PMV BLD AUTO: 8.4 FL (ref 5–10.5)
POTASSIUM SERPL-SCNC: 3.3 MMOL/L (ref 3.5–5.1)
PRO-BNP: 4433 PG/ML (ref 0–124)
RBC # BLD: 3.64 M/UL (ref 4–5.2)
SODIUM BLD-SCNC: 131 MMOL/L (ref 136–145)
WBC # BLD: 5.8 K/UL (ref 4–11)

## 2020-08-28 PROCEDURE — 94640 AIRWAY INHALATION TREATMENT: CPT

## 2020-08-28 PROCEDURE — 83880 ASSAY OF NATRIURETIC PEPTIDE: CPT

## 2020-08-28 PROCEDURE — 93880 EXTRACRANIAL BILAT STUDY: CPT

## 2020-08-28 PROCEDURE — 6370000000 HC RX 637 (ALT 250 FOR IP): Performed by: NURSE PRACTITIONER

## 2020-08-28 PROCEDURE — 97530 THERAPEUTIC ACTIVITIES: CPT

## 2020-08-28 PROCEDURE — 6360000002 HC RX W HCPCS: Performed by: NURSE PRACTITIONER

## 2020-08-28 PROCEDURE — 85027 COMPLETE CBC AUTOMATED: CPT

## 2020-08-28 PROCEDURE — 2580000003 HC RX 258: Performed by: INTERNAL MEDICINE

## 2020-08-28 PROCEDURE — 6370000000 HC RX 637 (ALT 250 FOR IP): Performed by: INTERNAL MEDICINE

## 2020-08-28 PROCEDURE — 36415 COLL VENOUS BLD VENIPUNCTURE: CPT

## 2020-08-28 PROCEDURE — 99233 SBSQ HOSP IP/OBS HIGH 50: CPT | Performed by: NURSE PRACTITIONER

## 2020-08-28 PROCEDURE — 80069 RENAL FUNCTION PANEL: CPT

## 2020-08-28 PROCEDURE — 99252 IP/OBS CONSLTJ NEW/EST SF 35: CPT | Performed by: SURGERY

## 2020-08-28 PROCEDURE — 2060000000 HC ICU INTERMEDIATE R&B

## 2020-08-28 RX ORDER — POTASSIUM CHLORIDE 20 MEQ/1
40 TABLET, EXTENDED RELEASE ORAL 3 TIMES DAILY
Status: DISCONTINUED | OUTPATIENT
Start: 2020-08-28 | End: 2020-09-02 | Stop reason: HOSPADM

## 2020-08-28 RX ORDER — POLYETHYLENE GLYCOL 3350 17 G/17G
17 POWDER, FOR SOLUTION ORAL DAILY
Status: DISCONTINUED | OUTPATIENT
Start: 2020-08-28 | End: 2020-09-02 | Stop reason: HOSPADM

## 2020-08-28 RX ORDER — TORSEMIDE 100 MG/1
50 TABLET ORAL 2 TIMES DAILY
Status: DISCONTINUED | OUTPATIENT
Start: 2020-08-28 | End: 2020-09-02

## 2020-08-28 RX ADMIN — POLYETHYLENE GLYCOL 3350 17 G: 17 POWDER, FOR SOLUTION ORAL at 14:49

## 2020-08-28 RX ADMIN — Medication 10 ML: at 21:45

## 2020-08-28 RX ADMIN — LAMOTRIGINE 200 MG: 100 TABLET ORAL at 21:44

## 2020-08-28 RX ADMIN — BUSPIRONE HYDROCHLORIDE 30 MG: 5 TABLET ORAL at 08:20

## 2020-08-28 RX ADMIN — CLOPIDOGREL BISULFATE 75 MG: 75 TABLET ORAL at 08:20

## 2020-08-28 RX ADMIN — SPIRONOLACTONE 50 MG: 25 TABLET ORAL at 08:20

## 2020-08-28 RX ADMIN — BUSPIRONE HYDROCHLORIDE 30 MG: 5 TABLET ORAL at 20:47

## 2020-08-28 RX ADMIN — DOCUSATE SODIUM 100 MG: 100 CAPSULE, LIQUID FILLED ORAL at 08:20

## 2020-08-28 RX ADMIN — POTASSIUM CHLORIDE 40 MEQ: 20 TABLET, EXTENDED RELEASE ORAL at 14:49

## 2020-08-28 RX ADMIN — POTASSIUM BICARBONATE 40 MEQ: 782 TABLET, EFFERVESCENT ORAL at 08:19

## 2020-08-28 RX ADMIN — FERROUS SULFATE TAB 325 MG (65 MG ELEMENTAL FE) 325 MG: 325 (65 FE) TAB at 16:32

## 2020-08-28 RX ADMIN — MORPHINE SULFATE 2 MG: 2 INJECTION, SOLUTION INTRAMUSCULAR; INTRAVENOUS at 16:32

## 2020-08-28 RX ADMIN — POTASSIUM CHLORIDE 40 MEQ: 20 TABLET, EXTENDED RELEASE ORAL at 20:45

## 2020-08-28 RX ADMIN — LAMOTRIGINE 200 MG: 100 TABLET ORAL at 08:20

## 2020-08-28 RX ADMIN — ASPIRIN 81 MG: 81 TABLET, COATED ORAL at 08:20

## 2020-08-28 RX ADMIN — ARIPIPRAZOLE 10 MG: 10 TABLET ORAL at 08:20

## 2020-08-28 RX ADMIN — Medication 10 ML: at 08:21

## 2020-08-28 RX ADMIN — PANTOPRAZOLE SODIUM 40 MG: 40 TABLET, DELAYED RELEASE ORAL at 08:21

## 2020-08-28 RX ADMIN — FERROUS SULFATE TAB 325 MG (65 MG ELEMENTAL FE) 325 MG: 325 (65 FE) TAB at 08:20

## 2020-08-28 RX ADMIN — MIDODRINE HYDROCHLORIDE 5 MG: 5 TABLET ORAL at 08:20

## 2020-08-28 RX ADMIN — TRAMADOL HYDROCHLORIDE 50 MG: 50 TABLET, FILM COATED ORAL at 11:47

## 2020-08-28 RX ADMIN — INSULIN LISPRO 2 UNITS: 100 INJECTION, SOLUTION INTRAVENOUS; SUBCUTANEOUS at 16:32

## 2020-08-28 RX ADMIN — ATORVASTATIN CALCIUM 80 MG: 80 TABLET, FILM COATED ORAL at 20:46

## 2020-08-28 RX ADMIN — DOBUTAMINE HYDROCHLORIDE 2.5 MCG/KG/MIN: 200 INJECTION INTRAVENOUS at 05:11

## 2020-08-28 RX ADMIN — INSULIN LISPRO 2 UNITS: 100 INJECTION, SOLUTION INTRAVENOUS; SUBCUTANEOUS at 11:47

## 2020-08-28 RX ADMIN — BENZTROPINE MESYLATE 1 MG: 1 TABLET ORAL at 20:46

## 2020-08-28 RX ADMIN — MORPHINE SULFATE 2 MG: 2 INJECTION, SOLUTION INTRAMUSCULAR; INTRAVENOUS at 08:29

## 2020-08-28 RX ADMIN — PANTOPRAZOLE SODIUM 40 MG: 40 TABLET, DELAYED RELEASE ORAL at 16:32

## 2020-08-28 RX ADMIN — TIOTROPIUM BROMIDE INHALATION SPRAY 2 PUFF: 3.12 SPRAY, METERED RESPIRATORY (INHALATION) at 07:48

## 2020-08-28 RX ADMIN — INSULIN GLARGINE 18 UNITS: 100 INJECTION, SOLUTION SUBCUTANEOUS at 20:49

## 2020-08-28 RX ADMIN — MAGNESIUM OXIDE TAB 400 MG (241.3 MG ELEMENTAL MG) 400 MG: 400 (241.3 MG) TAB at 08:29

## 2020-08-28 RX ADMIN — TORSEMIDE 50 MG: 100 TABLET ORAL at 20:46

## 2020-08-28 RX ADMIN — TRAMADOL HYDROCHLORIDE 50 MG: 50 TABLET, FILM COATED ORAL at 05:01

## 2020-08-28 RX ADMIN — Medication 10 ML: at 21:44

## 2020-08-28 RX ADMIN — TRAMADOL HYDROCHLORIDE 50 MG: 50 TABLET, FILM COATED ORAL at 20:46

## 2020-08-28 RX ADMIN — MIDODRINE HYDROCHLORIDE 5 MG: 5 TABLET ORAL at 16:32

## 2020-08-28 ASSESSMENT — PAIN DESCRIPTION - LOCATION
LOCATION: GENERALIZED
LOCATION: ABDOMEN
LOCATION: HIP;ABDOMEN

## 2020-08-28 ASSESSMENT — PAIN DESCRIPTION - FREQUENCY
FREQUENCY: CONTINUOUS
FREQUENCY: CONTINUOUS

## 2020-08-28 ASSESSMENT — PAIN SCALES - GENERAL
PAINLEVEL_OUTOF10: 8

## 2020-08-28 ASSESSMENT — PAIN DESCRIPTION - ONSET
ONSET: ON-GOING
ONSET: ON-GOING

## 2020-08-28 ASSESSMENT — PAIN DESCRIPTION - DESCRIPTORS
DESCRIPTORS: ACHING;RADIATING
DESCRIPTORS: ACHING;RADIATING
DESCRIPTORS: RADIATING

## 2020-08-28 ASSESSMENT — PAIN DESCRIPTION - DIRECTION: RADIATING_TOWARDS: SHOULDER

## 2020-08-28 ASSESSMENT — PAIN DESCRIPTION - PROGRESSION
CLINICAL_PROGRESSION: NOT CHANGED
CLINICAL_PROGRESSION: NOT CHANGED

## 2020-08-28 ASSESSMENT — PAIN DESCRIPTION - PAIN TYPE
TYPE: ACUTE PAIN

## 2020-08-28 ASSESSMENT — PAIN DESCRIPTION - ORIENTATION: ORIENTATION: LEFT

## 2020-08-28 NOTE — PROGRESS NOTES
buprenorphine-naloxone  1 Film Sublingual BID    clopidogrel  75 mg Oral Daily    ferrous sulfate  325 mg Oral BID     lamoTRIgine  200 mg Oral BID    magnesium oxide  400 mg Oral Daily    midodrine  5 mg Oral BID     pantoprazole  40 mg Oral BID    spironolactone  50 mg Oral Daily    tiotropium  2 puff Inhalation Daily    insulin lispro  0-12 Units Subcutaneous TID     insulin lispro  0-6 Units Subcutaneous Nightly    busPIRone  30 mg Oral BID    insulin glargine  18 Units Subcutaneous Nightly    atorvastatin  80 mg Oral Nightly    sodium chloride flush  10 mL Intravenous 2 times per day     PRN Meds: sodium chloride flush, acetaminophen, morphine, ondansetron, magnesium hydroxide, perflutren lipid microspheres, traMADol, traZODone, glucose, dextrose, glucagon (rDNA), dextrose, sodium chloride flush, acetaminophen **OR** acetaminophen, albuterol      Intake/Output Summary (Last 24 hours) at 8/28/2020 1414  Last data filed at 8/28/2020 1400  Gross per 24 hour   Intake 952.98 ml   Output 3750 ml   Net -2797.02 ml       Physical Exam Performed:    /85   Pulse 92   Temp 98.1 °F (36.7 °C) (Oral)   Resp 18   Ht 5' 4.5\" (1.638 m)   Wt 235 lb 9.6 oz (106.9 kg)   LMP 10/24/2012   SpO2 96%   BMI 39.82 kg/m²     General appearance: No apparent distress, appears stated age and cooperative. HEENT: Pupils equal, round, and reactive to light. Conjunctivae/corneas clear. Neck: Supple, with full range of motion. No jugular venous distention. Trachea midline. Respiratory:  Normal respiratory effort. Clear to auscultation, bilaterally without Rales/Wheezes/Rhonchi. Cardiovascular: Regular rate and rhythm with normal S1/S2 without murmurs, rubs or gallops. Abdomen: Soft, non-tender, non-distended with normal bowel sounds. Musculoskeletal: No clubbing, cyanosis bilaterally. Full range of motion without deformity. 1+ BLE edema.     Skin: Skin color, texture, turgor normal.  No rashes or gtt stopped 8/27. Now on torsemide 50 mg BID and spironolactone 50 mg daily. - GI consulted and following.    - RUQ US unremarkable. - Continue to trend LFTs  - has a history of alcoholism - sober since 2006 and on Suboxone. - limit narcotics if possible.     Acute on chronic combined systolic and diastolic heart failure with fluid overload, LVEF of 25%  - not on ACE inhibitor or ARB due to CKD. - has implanted CardioMems unit   - no ACE/ARB/ARNI due to unstable renal status.    - cardiology and nephrology consulted and following.  - daily weights, I&O.  - diuretics as above. Started on Dobutamine gtt 8/24, which she seems to be responding well to. CKD3  - baseline Cr is probably about 1.5   Monitor during diuresis.    - nephrology consulted and following.     Hyponatremia  - it looks like her baseline is in the high 120's, even when accounting for hyperglycemia.  After a week of aggressive management, including multiple doses of tolvaptan, she still was only discharged at 126 last time.    - nephrology input noted. - tolvaptan given 8/27. CAD  - aspirin, clopidogrel.  OK to resume high dose statin for now. Monitor for worsening LFTs. - stress test was abnormal, so will need LHC at some point during her stay, possibly on Thursday.       DM2  - adjusted insulin regimen while here.  Recent A1c 7.2.     Anemia - due to blood loss. - 14.7 cm L perinephric subcapsular hematoma was stable on repeat CT on this admission.   - continued on iron. Hypokalemia - on BID replacement. - follow BMP. Morbid obesity - body mass index is 42.72 kg/m². Complicating assessment and treatment. Placing patient at risk for multiple co-morbidities as well as early death and contributing to the patient's presentation. Counseled on weight loss    Severe left subclavian stenosis - found on cardiac cath. - vascular consulted and following.  - planning for subclavian stenting next week.     DVT Prophylaxis: SCDs  Diet: DIET CARDIAC; No Added Salt (3-4 GM);  Daily Fluid Restriction: 2000 ml  Code Status: Full Code    PT/OT Eval Status: not indicated    Dispo - likely another here through next week for subclavian stenting    TRAY Bynum - CNP

## 2020-08-28 NOTE — PLAN OF CARE
Problem: Pain:  Goal: Control of acute pain  Description: Control of acute pain  8/28/2020 0116 by Donna Dover RN  Outcome: Ongoing     Problem: Skin Integrity:  Goal: Will show no infection signs and symptoms  Description: Will show no infection signs and symptoms  8/28/2020 0116 by Donna Dover RN  Outcome: Ongoing     Problem: Nutrition  Goal: Optimal nutrition therapy  8/28/2020 0116 by Donna Dover RN  Outcome: Ongoing     Problem: HEMODYNAMIC STATUS  Goal: Patient has stable vital signs and fluid balance  8/28/2020 0116 by Donna Dover RN  Outcome: Ongoing

## 2020-08-28 NOTE — PROGRESS NOTES
Occupational Therapy  Facility/Department: [facility] C4 PCU  Daily Treatment Note  NAME: Leroy Luis  : 1965  MRN: [id number]    Date of Service: 2020    Discharge Recommendations:  24 hour supervision or assist   Home OT     Assessment   Performance deficits / Impairments: Decreased functional mobility ; Decreased ADL status  Assessment: This date upon entry pt laying flat in bed with BLEs off the EOB. Pt assisting that therapist help her sit up. Pt's bed controls utilized to sit up pt. Pt requires HHA-MIN A in order to position self to EOB. Pt able to sit EOB for 5 minutes. Pt declined to complete grooming/hygiene, due to her saying \"I did that yesterday\" Encouraged pt to complete exercises sitting EOB which she declined. Pt willing to walk in room. Pt provided with CGA during ambulation. Pt demos wide GABINO during ambulation. When pt went to sit at recliner. Pt cued to make sure to orient self completely in front of chair in order to sit. Cues for reaching back for armrests of chair. Pt's family member then entered room. Family member concerned regading urine color. Pt states she will let the RN deal with. Educated pt on importance of OOB activity. Sitting in recliner for meals, and alerting staff when need to get up. Pt completed sit to stand transfer from chair in order to place chair alarm. Cont POC. Activity Tolerance  Activity Tolerance: Patient limited by pain  Activity Tolerance: Pt is limited by abdominal pain  Safety Devices  Safety Devices in place: Yes  Type of devices: Left in chair;Nurse notified;Call light within reach; Chair alarm in place         Patient Diagnosis(es): There were no encounter diagnoses.       has a past medical history of Acute kidney injury ([facility] [address] Acute on chronic congestive heart failure ([address] Alcohol dependence ([facility] [address] Bipolar 1 disorder ([address] CAD (coronary artery disease), Cardiomyopathy ([address] Cellulitis, CHF (congestive heart failure) ([address] COPD (chronic obstructive pulmonary disease) ([facility] [address] Diabetic ulcer of left foot associated with type 2 diabetes mellitus ([facility] [address] Diabetic ulcer of toe of right foot associated with type 2 diabetes mellitus ([facility] [address] GERD (gastroesophageal reflux disease), High cholesterol, HTN (hypertension), Kidney disease, chronic, stage II (mild, EGFR 60+ ml/min), MI (myocardial infarction) ([facility] [address] Positive FIT (fecal immunochemical test), Pulmonary nodule, and PVD (peripheral vascular disease) ([facility] [address] has a past surgical history that includes Tonsillectomy; Cholecystectomy; tumor excision; Upper gastrointestinal endoscopy (4/25/2013); Upper gastrointestinal endoscopy (12/10/2015); Upper gastrointestinal endoscopy (01/06/2017); Arterial bypass surgry (Left, 01/06/2016); Cardiac catheterization (03/27/2018); Coronary angioplasty with stent (03/16/2018); Rotator cuff repair (Left, 04/22/2016); Coronary angioplasty with stent (01/03/2018); Insertable Cardiac Monitor (02/14/2019); femoral bypass (Right, 5/16/2019); transluminal angioplasty (Left, 10/08/2019); Cardiac catheterization (01/20/2020); Coronary artery bypass graft (N/A, 1/27/2020); vascular surgery (Left, 12/08/2015); transluminal angioplasty (Left, 04/29/2020); vascular surgery (Bilateral, 07/07/2020); Coronary angioplasty with stent (10/07/2019); transesophageal echocardiogram (01/26/2020); and Kidney surgery.     Restrictions  Restrictions/Precautions  Restrictions/Precautions: General Precautions, Fall Risk  Position Activity Restriction  Other position/activity restrictions: High fall risk per nursing assessment, up with assistance, Nabeel, MAAY  Subjective   General  Chart Reviewed: Yes  Patient assessed for rehabilitation services?: Yes  Family / Caregiver Present: No  Referring Practitioner: Otis Eason MD 8/18/2020  Diagnosis: R upper quadrant pain  Subjective  Subjective: \"I already had therapy\" referring to Physical Therapy  General Comment  Comments: RN cleared pt for OT; pt resting in bed, agreeable to therapy  Pain Assessment  Pain Assessment: 0-10  Pain Level: 8  Patient's Stated Pain Goal: No pain  Pain Type: Acute pain  Pain Location: Abdomen  Pain Descriptors: Aching;Radiating  Vital Signs  Patient Currently in Pain: Yes   Orientation  Orientation  Overall Orientation Status: Within Functional Limits  Objective    ADL  UE Dressing: Independent(gown management)        Balance  Sitting Balance: Modified independent   Standing Balance: Contact guard assistance  Standing Balance  Time: 1 minute  Activity: short ambulation within room, transfers  Comment: pt appears very deconditioned  Functional Mobility  Functional - Mobility Device: No device  Activity: Other(within the room)  Assist Level: Contact guard assistance  Bed mobility  Supine to Sit: Minimal assistance(HHA, with HOB elevated)  Transfers  Sit to stand: Contact guard assistance  Stand to sit: Contact guard assistance  Transfer Comments: cues for positioning self to chair in order to reach back for amrests             Plan   Plan  Times per week: 2-4x/ week  Current Treatment Recommendations: Self-Care / ADL, Functional Mobility Training, Safety Education & Training    AM-PAC Score        AM-[facility] Inpatient Daily Activity Raw Score: 17 (08/28/20 1348)  AM-PAC Inpatient ADL T-Scale Score : 37.26 (08/28/20 1348)  ADL Inpatient CMS 0-100% Score: 50.11 (08/28/20 1348)  ADL Inpatient CMS G-Code Modifier : CK (08/28/20 1348)    Goals  Short term goals  Time Frame for Short term goals: 1 week unless otherwise specified 8/26  Short term goal 1: Pt will complete LE dressing with SBA; 8/25 supervision with use of LE AE  Short term goal 2: Pt will complete toielt transfers with SBA by 8/24; 8/24 Progressing with transfers from bed modified independent, presently has roche catheter & denied need for toilet  Short term goal 3: Pt will increase standing activity tolerance to 3-5 minutes in order to manage ADLs with increased independence.   Patient Goals   Patient goals : \"to be able to go home from here\"       Therapy Time   Individual Concurrent Group Co-treatment   Time In 1255         Time Out 1318         Minutes 23         Timed Code Treatment Minutes: [address] [place], [place]

## 2020-08-28 NOTE — CONSULTS
Vascular Surgery Consultation    Date of Admission:  8/18/2020  6:43 PM  Date of Consultation:  8/28/2020    PCP:  Celesta Dubin, PA-C       Chief Complaint:     History of Present Illness: We are asked to see this patient in consultation by Dr. Serita Frankel regarding left subclavian stenosis. Saurabh Linares is a 62 y.o. female who well known to me. She has an extensive Cardiac and Vascular history as detailed in chart. She underwent CABG in January which included a LIMA bypass. Cardiac Cath was now performed and high grade stenosis of Subclavian was noted with retrograde flow in LIMA graft. She also has a left Axillary to brachial bypass place in 2015. This was evaluated with duplex in January and was noted to be patent. She report no arm ishcemic symptoms.       Past Medical History:  Past Medical History:   Diagnosis Date    Acute kidney injury ([address] 12/25/2019    Acute on chronic congestive heart failure (HCC)     Alcohol dependence ([address] 3/10/2010    Bipolar 1 disorder ([address]     CAD (coronary artery disease)     Cardiomyopathy ([address] 06/29/2020    EF= 25%    Cellulitis 6/3/2020    [facility]congest[facility] failure) ([facility])     COPD (chronic obstructive pulmonary disease) ([facility])     Diabetic ulcer of left foot associated with type 2 diabetes mellitus ([address] 1/18/2020    Diabetic ulcer of toe of right foot associated with type 2 diabetes mellitus ([address] 1/18/2020    GERD (gastroesophageal reflux disease)     High cholesterol     HTN (hypertension)     Kidney disease, chronic, stage II (mild, EGFR 60+ ml/min)     MI (myocardial infarction) ([address] 10/07/2019    NSTEMI    Positive FIT (fecal immunochemical test) 2/28/2020    Pulmonary nodule     PVD (peripheral vascular disease) ([address]        Past Surgical History:  Past Surgical History:   Procedure Laterality Date    ARTERIAL BYPASS SURGRY Left 01/06/2016    Axillary/Subclavian to Brachial Bypass w/reversed SVG   [facility] CARDIAC CATHETERIZATION  03/27/2018    Dr. Wilbur Page - at [address]  01/20/2020    Dr. Kelli Azevedo      pancreatitis post surgery    CORONARY ANGIOPLASTY WITH STENT PLACEMENT  03/16/2018    MELODY- 3.5 x 38 and 3.5 x 20 to Cx    CORONARY ANGIOPLASTY WITH STENT PLACEMENT  01/03/2018    MELODY- 3.0 x 38 and 2.75 x 26 and 2.75 x 8 and 2.75 x 22 to the LAD    CORONARY ANGIOPLASTY WITH STENT PLACEMENT  10/07/2019    MELODY- 2.5 x 8 to [address] GRAFT N/A 1/27/2020    CORONARY ARTERY BYPASS GRAFTING X 1, ON PUMP BEATING HEART, INTERNAL MAMMARY ARTERY TO LEFT ANTERIOR DESCENDING ARTERY, VAS CATH INSERTION, URI, PLATELET GEL APPLICATION, 5 LEVEL BILATERAL INTERCOSTAL NERVE BLOCK, STERNAL PLATING performed by Marianela Lyn MD at [address] Right 5/16/2019    fem-pop, performed by Kaiden Wade MD at [address]  02/14/2019    CardioMEMs insertion for CHF    KIDNEY SURGERY      ROTATOR CUFF REPAIR Left 04/22/2016    TONSILLECTOMY      TRANSESOPHAGEAL ECHOCARDIOGRAM  01/26/2020    TRANSLUMINAL ANGIOPLASTY Left 10/08/2019    with stenting, at [facility]    [address] Left 04/29/2020    of the [facility] re-occlusion    TUMOR EXCISION      benign tumors X 2 chest & axilla    UPPER GASTROINTESTINAL ENDOSCOPY  4/25/2013    BX barrettBAYLEE vang, gastritis    UPPER GASTROINTESTINAL ENDOSCOPY  12/10/2015    UPPER GASTROINTESTINAL ENDOSCOPY  01/06/2017    Gastritis    VASCULAR SURGERY Left 12/08/2015    upper extremity and mesenteric angiogram (diagnostic only)    VASCULAR SURGERY Bilateral 07/07/2020    diagnostic lower extremity angiogram only       Home Medications:   Prior to Admission medications    Medication Sig Start Date End Date Taking?  Authorizing Provider   insulin aspart (NOVOLOG FLEXPEN) 100 UNIT/ML injection pen Inject 10 Units into the skin 3 times daily (before meals) 8/14/20   Trent Ortiz TRAY Terrell CNP   torsemide (DEMADEX) 100 MG tablet Take 1 tablet by mouth daily 100 mg daily except 50 mg twice weekly (Monday and Thursday). 8/12/20 9/11/20  Nancy Cervantes MD   spironolactone (ALDACTONE) 50 MG tablet Take 1 tablet by mouth daily 8/13/20 9/12/20  Nancy Cervantes MD   ferrous sulfate (IRON 325) 325 (65 Fe) MG tablet Take 1 tablet by mouth 2 times daily (with meals) 8/12/20 9/11/20  Nancy Cervantes MD   midodrine (PROAMATINE) 5 MG tablet Take 1 tablet by mouth 2 times daily (with meals) 8/12/20 9/11/20  Nancy Cervantes MD   potassium chloride (KLOR-CON M) 20 MEQ extended release tablet Take 1 tablet by mouth daily 8/12/20 9/11/20  Nancy Cervantes MD   traZODone (DESYREL) 100 MG tablet Take 100 mg by mouth nightly as needed for Sleep Can take 1-2 tabs nightly    Historical Provider, MD   DOXEPIN HCL PO Take 1 capsule by mouth nightly as needed Patient unsure of exact dosage    Historical Provider, MD   albuterol sulfate  (90 Base) MCG/ACT inhaler Inhale 2 puffs into the lungs every 6 hours as needed for Wheezing or Shortness of Breath 7/3/20   Peyton Guerra MD   Insulin Degludec (TRESIBA FLEXTOUCH) 100 UNIT/ML SOPN Inject 30 Units into the skin nightly 5/27/20   TRAY Barcenas CNP   insulin lispro, 1 Unit Dial, 100 UNIT/ML SOPN Inject 10 Units into the skin 3 times daily 5/20/20   TRAY Barcenas CNP   atorvastatin (LIPITOR) 80 MG tablet Take 1 tablet by mouth nightly 2/28/20   TRAY Sidhu CNP   tiotropium (SPIRIVA RESPIMAT) 2.5 MCG/ACT AERS inhaler INHALE 2 PUFFS INTO THE LUNGS DAILY 2/25/20   Verito Kaplan MD   buprenorphine-naloxone (SUBOXONE) 8-2 MG FILM SL film Place 1 Film under the tongue 2 times daily. Historical Provider, MD   benztropine (COGENTIN) 1 MG tablet Take 1 mg by mouth nightly  12/13/19   Historical Provider, MD   blood glucose test strips (EXACTECH TEST) strip 6 times daily.  12/19/19   Sandro Rasmussen, TRAY - CNP   Insulin Pen Needle (B-D ULTRAFINE Allergies:  Lisinopril     Social History:      Social History     Socioeconomic History    Marital status: Single     Spouse name: Not on file    Number of children: Not on file    Years of education: Not on file    Highest education level: Not on file   Occupational History    Not on file   Social Needs    Financial resource strain: Not on file    Food insecurity     Worry: Not on file     Inability: Not on file    Transportation needs     Medical: Not on file     Non-medical: Not on file   Tobacco Use    Smoking status: Current Every Day Smoker     Packs/day: 0.50     Years: 30.00     Pack years: 15.00     Types: Cigarettes     Last attempt to quit: 2019     Years since quittin.0    Smokeless tobacco: Never Used    Tobacco comment: trying to quit, one cigarette daily   Substance and Sexual Activity    Alcohol use: No     Comment: quit      Drug use: Never    Sexual activity: Yes     Partners: Male   Lifestyle    Physical activity     Days per week: Not on file     Minutes per session: Not on file    Stress: Not on file   Relationships    Social connections     Talks on phone: Not on file     Gets together: Not on file     Attends Yazdanism service: Not on file     Active member of club or organization: Not on file     Attends meetings of clubs or organizations: Not on file     Relationship status: Not on file    Intimate partner violence     Fear of current or ex partner: Not on file     Emotionally abused: Not on file     Physically abused: Not on file     Forced sexual activity: Not on file   Other Topics Concern    Not on file   Social History Narrative    Not on file       Family History:        Problem Relation Age of Onset    Heart Disease Maternal Grandmother     Cancer Mother         lung and brain cancer    Cancer Maternal Aunt         lung and brain cancer       Review of Systems:  A 14 point review of systems was completed.  Pertinent positives identified in the HPI, all other review of systems negative. Physical Examination:    /69   Pulse 90   Temp 98.1 °F (36.7 °C) (Oral)   Resp 18   Ht 5' 4.5\" (1.638 m)   Wt 235 lb 9.6 oz (106.9 kg)   LMP 10/24/2012   SpO2 95%   BMI 39.82 kg/m²        Admission Weight: 251 lb 11.2 oz (114.2 kg)       General appearance: NAD  Eyes: PERRLA  Neck: no JVD, no lymphadenopathy. Respiratory: effort is unlabored, no crackles, wheezes or rubs. Cardiovascular: regular, no murmur. No carotid bruits. Pulses:    carotid brachial radial   RIGHT 2 2 1   LEFT 2 2 1   GI: abdomen soft, nondistended, no organomegaly. Musculoskeletal: strength and tone normal.  Extremities: warm and pink. Skin: no dermatitis or ulceration. Neuro/psychiatric: grossly intact. MEDICAL DECISION MAKING/TESTING        Cardiac Cath and Left Subclavian angio films personally reviewed. High grade stenosis of Left Subclavian ~ 1-2cm beyond origin      Labs:   CBC:   Recent Labs     08/28/20  0441   WBC 5.8   HGB 9.8*   HCT 30.2*   MCV 83.0        BMP:   Recent Labs     08/26/20  0445 08/27/20  0410 08/28/20  0441   * 129* 131*   K 3.0* 3.7 3.3*   CL 83* 82* 83*   CO2 29 31 30   PHOS 2.6 2.8 3.2   BUN 47* 44* 40*   CREATININE 1.9* 2.0* 2.1*   CALCIUM 9.8 10.0 9.7     Cardiac Enzymes: No results for input(s): CKTOTAL, CKMB, CKMBINDEX, TROPONINI in the last 72 hours. PT/INR: No results for input(s): PROTIME, INR in the last 72 hours. APTT: No results for input(s): APTT in the last 72 hours.   Liver Profile:  Lab Results   Component Value Date    AST 14 08/26/2020    ALT 8 08/26/2020    BILIDIR 2.1 08/26/2020    BILITOT 4.2 08/26/2020    ALKPHOS 244 08/26/2020     Lab Results   Component Value Date    CHOL 58 01/27/2020    HDL 21 01/27/2020    TRIG 78 01/27/2020     TSH:  Lab Results   Component Value Date    TSH 1.47 08/04/2020     UA:   Lab Results   Component Value Date    COLORU Yellow 08/18/2020    PHUR 6.5 08/18/2020    WBCUA 0-2 08/18/2020    RBCUA 0-2 08/18/2020    BACTERIA Rare 08/18/2020    CLARITYU Clear 08/18/2020    SPECGRAV 1.010 08/18/2020    LEUKOCYTESUR Negative 08/18/2020    UROBILINOGEN 0.2 08/18/2020    BILIRUBINUR Negative 08/18/2020    BLOODU Negative 08/18/2020    GLUCOSEU Negative 08/18/2020    AMORPHOUS Rare 08/04/2020           Diagnosis:  Left Subclavian stenosis with Cardiac Steal.     Plan: Left Subclavian angioplasty and stenting on Tuesday 9/1. Diagnostic studies have already been performed so this hopefully can be accomplished with use of minimal contrast.    I have discussed all risks (including but not limited to bleeding, thrombosis, contrast allergy, renal failure, and early and late failure of intervention), benefits and alternatives of catheter-based angiography. Patient freely consents and is eager to proceed. All questions and expectations addressed.

## 2020-08-28 NOTE — PROGRESS NOTES
Cath site dressing was changed d/t bleeding from skin tear that is in abdominal fold above cath site in right groin. Cath site itself is free of bruising, hematoma or redness.

## 2020-08-28 NOTE — FLOWSHEET NOTE
08/28/20 0815   Vital Signs   Temp 98.1 °F (36.7 °C)   Temp Source Oral   Pulse 90   Heart Rate Source Monitor   Resp 18   /69   BP Location Right lower arm   BP Upper/Lower Lower   MAP (mmHg) 84   Patient Position Semi fowlers   Level of Consciousness 0   MEWS Score 1   Patient Currently in Pain Yes   Pain Assessment   Pain Assessment 0-10   Pain Level 8   Oxygen Therapy   SpO2 95 %   O2 Device None (Room air)

## 2020-08-28 NOTE — PROGRESS NOTES
Page sent to Dr. Clarissa Chao via perfect serve regarding morning lab results. Awaiting response. 8/28/20 6:36 AM   [phone] Hospital or Facility: [facility] C4 PROGRESSIVE CARE From: Roro Segal RE: Taryn Byrne RM: [id number] FYI: morning labs have resulted. Potassium: 3.3, Hgb & Hct continue to be decreased as well. Pt currently receiving Effer-K 40 mEq two times daily, Lasix gtt was recently discontinues as of yesterday. Please advise. No callback needed. Thanks! Need Callback: NO CALLBACK REQ  Read 6:40 AM     8/28/20 6:41 AM   No additional orders. -per Dr. Clarissa Chao via perfect serve.

## 2020-08-28 NOTE — PROGRESS NOTES
Pt known to service: 1/27/20 Urgent coronary artery bypass grafting surgery x1 with pedicled left internal mammary artery to the LAD. Cardiopulmonary bypass with on-pump beating-heart technique, no cross-clamp. Transesophageal echo. Epiaortic ultrasound. [place]ibal[place]-Jurgen catheter placement. Doppler verification of grafts. Bilateral five-level intercostal nerve block with Exparel. Platelet gel application. Sternal plating. Vas-Cath placement. Our service was consulted for coronary steal by cardiology. Dr. Alverto Gillespie reviewed patient's recent imaging and discussed it with her vascular surgeon (Dr. Amrik Phelps). He is scheduling pt for stenting of her left subclavian stenosis early next week. We will sign off as our services are not needed and her bypass appears patent. Call if there are questions. TRAY Baez-CNP  8/28/2020  11:57 AM   Note reviewed, events of last 24 hours reviewed along with vital signs and I/Os and patient examined. Assessment and plans discussed and are as outlined above.      Rhonda Patel MD, FACS, [facility] [place] FAC[place], [place]

## 2020-08-28 NOTE — FLOWSHEET NOTE
08/27/20 2015   Assessment   Charting Type Shift assessment   Neurological   Neuro (WDL) WDL   Level of Consciousness 0   Orientation Level Oriented X4   Cognition Appropriate judgement; Appropriate safety awareness; Appropriate attention/concentration; Appropriate for developmental age; Follows commands   Language Clear; Appropriate for developmental age   Size R Pupil (mm) 2   R Pupil Shape Round   R Pupil Reaction Brisk   Size L Pupil (mm) 2   L Pupil Shape Round   L Pupil Reaction Brisk   R Hand  Moderate   L Hand  Moderate   R Foot Dorsiflexion Moderate   L Foot Dorsiflexion Moderate   R Foot Plantar Flexion Moderate   L Foot Plantar Flexion Moderate   RUE Motor Response Responds to command;Normal extension;Normal flexion   LUE Motor Response Responds to command;Normal extension;Normal flexion   RLE Motor Response Responds to command;Normal extension;Normal flexion   LLE Motor Response Responds to command   Tongue Deviation None   [place] Coma Scale   Eye Opening 4   Best Verbal Response 5   Best Motor Response 6   [place] Coma Scale Score 15   HEENT   HEENT (WDL) X   Right Eye Intact; Impaired vision   Left Eye Intact; Impaired vision   Voice Normal   Mucous Membrane Moist;Pink; Intact   Teeth Missing teeth   Respiratory   Respiratory (WDL) X   Respiratory Pattern Regular   Respiratory Depth Normal   Respiratory Quality/Effort Dyspnea with exertion   Chest Assessment Chest expansion symmetrical;Trachea midline   L Breath Sounds Clear;Diminished   R Breath Sounds Clear;Diminished   Cough and Deep Breathe   Cough and Deep Breathe Yes  (pt reminded and educated on benefit)   Cardiac   Cardiac (WDL) WDL   Cardiac Regularity Regular   Heart Sounds S1, S2   Cardiac Rhythm NSR   Rhythm Interpretation   Pulse 97   Cardiac Monitor   Telemetry Monitor On Yes   Telemetry Audible Yes   Telemetry Alarms Set Yes   Telemetry Box Number 68   Gastrointestinal   Abdominal (WDL) X   RUQ Bowel Sounds Active; Audible   LUQ Bowel Integrity Location 3 Other (Comment)  (Scabs)    Location 3 L foot, 2nd toe   Preventative Dressing Yes   Assessed this shift? Yes   Skin Integrity Site 4   Skin Integrity Location 4 Redness   Location 4 Abd folds, coccyx   Preventative Dressing Yes   Assessed this shift? Yes   Skin Integrity Site 5   Skin Integrity Location 5 Tear  (Present upon shift handoff assessment of site)   Location 5 Right abdominal fold above cath site   Preventative Dressing   (Transparent dressing changed by dayshift)   Assessed this shift? Yes   Musculoskeletal   Musculoskeletal (WDL) X   RUE Full movement   LUE Full movement   RL Extremity Full movement;Swelling   LL Extremity Full movement;Swelling   Genitourinary   Genitourinary (WDL) X  (roche)   Flank Tenderness Yes   Suprapubic Tenderness No   Dysuria GEORGE   Urine Assessment   Incontinence   (f/c in place)   Urine Color Yellow/straw   Anus/Rectum   Anus/Rectum (WDL) WDL   Wound 08/05/20 Buttocks Inner;Left   Date First Assessed/Time First Assessed: 08/05/20 0300   Present on Hospital Admission: Yes  Location: Buttocks  Wound Location Orientation: Inner;Left   Wound Other  (Healed stage II x2)   Dressing/Treatment Open to air   Wound Assessment Dry; Intact; Pink   Drainage Amount None   Odor None   Shaye-wound Assessment Pink   Wound 07/30/20 #4, Left lateral foot, diabetic foot ulcer, Godwin 1, Onset 7/25/20   Date First Assessed: 07/30/20   Wound Description (Comments): #4, Left lateral foot, diabetic foot ulcer, Godwin 1, Onset 7/25/20   Wound Diabetic   Dressing/Treatment Betadine swabs;Open to air   Wound Assessment Brown;Dry  (scab)   Drainage Amount None   Odor None   Shaye-wound Assessment Pink;Dry; Intact   Wound 07/16/20 #3, left second toe, DFU, godwin 1, onset 5/1/2020   Date First Assessed/Time First Assessed: 07/16/20 1305   Primary Wound Type: Diabetic Ulcer  Wound Description (Comments): #3, left second toe, DFU, godwin 1, onset 5/1/2020   Wound Diabetic Dressing/Treatment Betadine swabs;Open to air   Wound Assessment Brown;Dry  (Scab)   Drainage Amount None   Odor None   Shaye-wound Assessment Dry;Pink; Intact   Urethral Catheter   Placement Date/Time: 08/18/20 2202   Collection Container: Standard  Securement Method: Securing device (Describe)  Urine Returned: Yes   Catheter Indications Need for fluid management in critically ill patients in a critical care setting not able to be managed by other means such as BSC with hat, bedpan, urinal, condom catheter, or short term intermittent urethral catherization   Site Assessment No urethral drainage   Urine Color Yellow   Urine Appearance Sediment   Psychosocial   Psychosocial (WDL) WDL   Patient Behaviors Cooperative; Tearful

## 2020-08-28 NOTE — PROGRESS NOTES
Patient's EF (Ejection Fraction) is less than 40%    Heart Failure Medications:   Diuretics[de-identified] Spironolactone     (One of the following REQUIRED for EF <40%/SYSTOLIC FAILURE but MAY be used in EF% >40%/DIASTOLIC FAILURE)        ACE[de-identified] None        ARB[de-identified] None         ARNI[de-identified] None    (Beta Blockers)   NON- Evidenced Based Beta Blocker (for EF% >40%/DIASTOLIC FAILURE): None     Evidenced Based Beta Blocker::(REQUIRED for EF% <40%/SYSTOLIC FAILURE) None  . .................................................................................................................................................. Patient's weights and intake/output reviewed: Yes    Patient's Last Weight: 235.6 lbs obtained by standing scale. Difference of 5 lbs less than last documented weight. Intake/Output Summary (Last 24 hours) at 8/28/2020 6379  Last data filed at 8/28/2020 7599  Gross per 24 hour   Intake 1240.8 ml   Output 4700 ml   Net -3459.2 ml       Comorbidities Reviewed Yes    Patient has a past medical history of Acute kidney injury ([address] Acute on chronic congestive heart failure ([address] Alcohol dependence ([address] Bipolar 1 disorder ([address] CAD (coronary artery disease), Cardiomyopathy ([address] Cellulitis, CHF (congestive heart failure) ([address] COPD (chronic obstructive pulmonary disease) ([address] Diabetic ulcer of left foot associated with type 2 diabetes mellitus ([address] Diabetic ulcer of toe of right foot associated with type 2 diabetes mellitus ([address] GERD (gastroesophageal reflux disease), High cholesterol, HTN (hypertension), Kidney disease, chronic, stage II (mild, EGFR 60+ ml/min), MI (myocardial infarction) ([facility] Positive FIT (fecal immunochemical test), Pulmonary nodule, and PVD (peripheral vascular disease) ([facility]      >>For CHF and Comorbidity documentation on Education Time and Topics, please see Education Tab    Progressive Mobility Assessment:  What is this patient's Current Level of Mobility?: Ambulatory- with

## 2020-08-28 NOTE — TELEPHONE ENCOUNTER
[facility] faxed over a form stating insurance will only cover Klor Con M20 5 tablets daily. Please advise.      Call Reema Jessica [phone]

## 2020-08-28 NOTE — PROGRESS NOTES
Nephrology Progress Note   [email or web address]      This patient is a 62year old female whom we are following for SHAUNNA on CKD. Subjective: The patient was seen and examined. Continues to diurese on Dobutamine drip. Now off Lasix drip. Family History: No family at bedside  ROS: No fever or chills      Vitals:  /85   Pulse 92   Temp 98.1 °F (36.7 °C) (Oral)   Resp 18   Ht 5' 4.5\" (1.638 m)   Wt 235 lb 9.6 oz (106.9 kg)   LMP 10/24/2012   SpO2 96%   BMI 39.82 kg/m²   I/O last 3 completed shifts: In: 1240.8 [P.O.:600; I.V.:640.8]  Out: 4700 [Urine:4700]  I/O this shift:  In: 120 [P.O.:120]  Out: -     Physical Exam:  Physical Exam  Vitals signs reviewed. Constitutional:       General: She is not in acute distress. Appearance: Normal appearance. HENT:      Head: Normocephalic and atraumatic. Mouth/Throat:      Mouth: Mucous membranes are moist.   Eyes:      General: No scleral icterus. Conjunctiva/sclera: Conjunctivae normal.   Cardiovascular:      Rate and Rhythm: Normal rate. Heart sounds: No friction rub. Pulmonary:      Effort: Pulmonary effort is normal.      Breath sounds: Rales present. Comments: Diminished breath sounds bilateral  Abdominal:      Tenderness: There is no abdominal tenderness. Musculoskeletal:      Right lower leg: Edema present. Left lower leg: Edema present. Neurological:      Mental Status: She is alert.            Medications:   polyethylene glycol  17 g Oral Daily    sodium chloride flush  10 mL Intravenous 2 times per day    potassium bicarb-citric acid  40 mEq Oral BID    docusate sodium  100 mg Oral Daily    ARIPiprazole  10 mg Oral Daily    aspirin EC  81 mg Oral Daily    benztropine  1 mg Oral Nightly    buprenorphine-naloxone  1 Film Sublingual BID    clopidogrel  75 mg Oral Daily    ferrous sulfate  325 mg Oral BID WC    lamoTRIgine  200 mg Oral BID    magnesium oxide  400 mg Oral Daily    midodrine  5 mg Oral BID     pantoprazole  40 mg Oral BID    spironolactone  50 mg Oral Daily    tiotropium  2 puff Inhalation Daily    insulin lispro  0-12 Units Subcutaneous TID     insulin lispro  0-6 Units Subcutaneous Nightly    busPIRone  30 mg Oral BID    insulin glargine  18 Units Subcutaneous Nightly    atorvastatin  80 mg Oral Nightly    sodium chloride flush  10 mL Intravenous 2 times per day         Labs:  Recent Labs     08/28/20  0441   WBC 5.8   HGB 9.8*   HCT 30.2*   MCV 83.0        Recent Labs     08/26/20  0445 08/27/20  0410 08/28/20  0441   * 129* 131*   K 3.0* 3.7 3.3*   CL 83* 82* 83*   CO2 29 31 30   GLUCOSE 131* 113* 177*   PHOS 2.6 2.8 3.2   BUN 47* 44* 40*   CREATININE 1.9* 2.0* 2.1*   LABGLOM 27* 26* 24*   GFRAA 33* 31* 29*           Assessment/Plan:    SHAUNNA/CKD stage 3.  - Etiology: ATN (hypotension; contrast dye) from last admission  - Data: SCr peaked at 2.0, improved to 1.2mg/dL but now back up to 2.1mg/dL. - Kidney function relatively stable. - Inotrope added on 8/24/20. Off Lasix drip. - Renal function panel daily.     CHF - acute on chronic  - On Dobutamine drip. - 2 liter/day fluid restriction  - Strict I's/O's, daily weights   - CardioMEMS monitoring per Cardiology     Recent Perinephric hematoma. - Complication of L renal artery angioplasty / stent placement  - S/P coil embolization of subsegmental L renal artery  - Stable in size based on CT this admission     L Renal Artery Stenosis. - S/P L renal artery angioplasty / stent placement  - BP satisfactory. - Follow for late recurrence of HTN (post-perinephric bleed) (I.e, Page kidney).     Anemia - blood loss. - Hgb stabilized in 8-9 range now  - S/P transfusion 2 units PRBC's last admission, on oral Fe.     Hyponatremia  - Has been present consistently since 4/20   - Related to volume excess / CHF  - Received Tolvaptan x 1.          Hypokalemia.  - D/C Effer-K. KCl 40meq 3x/day.     Severe L Subclavian Stenosis.  -  Rhonda planning for stenting next week. Please do not hesitate to contact me at [phone] if with questions. Thank you!     Veronica Knight MD  8/28/2020  [facility]

## 2020-08-28 NOTE — PROGRESS NOTES
[address]   Daily Progress Note    Admit Date:  8/18/2020  HPI:  No chief complaint on file. Interval history:   Presented with right sided abdominal pain radiating to right chest, associated with shortness of breath, weight gain and swelling. Troponin and BNP elevated.      Hx of CAD s/p PCI to LAD and LCx 2018, CABG X1 (LIMA to LAD,Jan 2020 for restenosis of LAD and LCx and significant dz in RCA). Hx of ICM, sCHF s/p CardioMEMs implant (2/2019), MR, PAD, CKD, DM, COPD, leg wounds.       She was recently admitted following left renal artery angioplasty and stenting complicated by perinephric hematoma requiring coil embolization of bleeding vessel. Subjective:  Ms. Patti Burton is tired today.  S/p LHC yesterday showing left subclavian stenosis    Objective:   /85   Pulse 92   Temp 98.1 °F (36.7 °C) (Oral)   Resp 18   Ht 5' 4.5\" (1.638 m)   Wt 235 lb 9.6 oz (106.9 kg)   LMP 10/24/2012   SpO2 96%   BMI 39.82 kg/m²       Intake/Output Summary (Last 24 hours) at 8/28/2020 1341  Last data filed at 8/28/2020 1011  Gross per 24 hour   Intake 952.98 ml   Output 2900 ml   Net -1947.02 ml       NYHA: IV  Tele NSR  Physical Exam:  General:  Awake, alert, NAD  Skin:  Warm and dry, jaundice  Neck:  JVD difficult to assess due to body habitus  Chest:  Dim to auscultation, no wheezes/rhonchi/rales  Cardiovascular:  RRR S1S2, no m/r/g   Abdomen:  Soft, nontender, +bowel sounds, + flank edema- softer  Extremities:  ++ BLE  edema    Medications:    polyethylene glycol  17 g Oral Daily    potassium chloride  40 mEq Oral TID    sodium chloride flush  10 mL Intravenous 2 times per day    docusate sodium  100 mg Oral Daily    ARIPiprazole  10 mg Oral Daily    aspirin EC  81 mg Oral Daily    benztropine  1 mg Oral Nightly    buprenorphine-naloxone  1 Film Sublingual BID    clopidogrel  75 mg Oral Daily    ferrous sulfate  325 mg Oral BID WC    lamoTRIgine  200 mg Oral BID    magnesium oxide 400 mg Oral Daily    midodrine  5 mg Oral BID     pantoprazole  40 mg Oral BID    spironolactone  50 mg Oral Daily    tiotropium  2 puff Inhalation Daily    insulin lispro  0-12 Units Subcutaneous TID     insulin lispro  0-6 Units Subcutaneous Nightly    busPIRone  30 mg Oral BID    insulin glargine  18 Units Subcutaneous Nightly    atorvastatin  80 mg Oral Nightly    sodium chloride flush  10 mL Intravenous 2 times per day      DOBUTamine 2.5 mcg/kg/min (08/28/20 0511)    dextrose         Lab Data:  CBC:   Recent Labs     08/28/20  0441   WBC 5.8   HGB 9.8*        BMP:    Recent Labs     08/26/20  0445 08/27/20  0410 08/28/20  0441   * 129* 131*   K 3.0* 3.7 3.3*   CO2 29 31 30   BUN 47* 44* 40*   CREATININE 1.9* 2.0* 2.1*     INR:  No results for input(s): INR in the last 72 hours. BNP:    No results for input(s): PROBNP in the last 72 hours. Lab Results   Component Value Date    LVEF 38 01/24/2020       FINDINGS      HEMODYNAMICS     CHAMBER PRESSURE SATURATION   RA  15  54 %   RV  62/16     PA  69/20  51 %   PW  19 with V waves to 25     AORTA  118/63  94 %      HERMILA CARDIAC OUTPUT  5.4 L/min   SVR  1075 L/min   PVR  343 L/min      Left subclavian angiogram shows proximal 95 to 99% stenosis. There appears to be retrograde filling of the left subclavian artery by way of the LIMA and there appears to be coronary steal.  The vertebral arteries not well visualized.     Left heart catheterization     LVEDP  22   GRADIENT ACROSS AORTIC VALVE  none         CORONARY ARTERIES     LM  Proximal less than 10% stenosis, mid 10 to 20% stenosis, distal 70% stenosis.     Overall similar appearance to prior angiograms         LAD  Ostial 70% stenosis, the LAD is extensively stented in the proximal and mid segments, the stents appear to be widely patent with 20% in-stent restenosis, distal vessel has less than 10% stenosis.   There is retrograde filling into the LIMA to LAD graft that fills the left subclavian artery.     D1 is a small vessel with ostial 90% stenosis.     Overall similar appearance to prior angiograms         LCX  Circumflex is stented throughout the proximal and mid and distal segments into OM 1 which is a large vessel. Stents are widely patent with 10 to 20% in-stent restenosis. The stents jailed the AV groove circumflex and at the mid AV groove circumflex, there is a 70 to 80% stenosis.           RCA Dominant, medium vessel, heavily calcified, proximal-mid 60 to 70% stenosis, distal less than 10% stenosis, overall similar angiographically as compared to prior angiograms.      BYPASS GRAFTS     LIMA-LAD  Visualized nonselectively through retrograde filling from the native LAD, it is not visualized with antegrade flow from the left subclavian artery injection it appears to be widely patent with less than 10% [id number] stenosis.      CONCLUSIONS:      Elevated filling pressures, continue diuresis  CardioMEMS had good correlation with the right heart catheterization numbers. Severe pulmonary hypertension  Patent LIMA to LAD graft however there appears to be coronary steal phenomenon due to severe left subclavian stenosis  We will consider left subclavian intervention  Will obtain follow-up vascular ultrasound to assess this  We will consult with CT surgery regarding therapeutic options    STRESS MPI 8/22/20:    Summary    Calculated LVEF is not reliable on study which is technically difficult, estimate of 51% is likely an overestimate     Mid-distal anteroseptal/apical akinesis    Large fixed defects in anteroseptal/apical walls    Mild to moderate reversibility in lateral walls consistent with ischemia    Overall, this would be considered an abnormal high risk study      LIMITED ECHO 8/19/20:    Summary   Limited only f/u for LVEF and mitral regurgitation. Technically difficult examination.    The left ventricular systolic function is severely reduced with an ejection fraction of 25%.   There is hypokinesis of the apex, apical lateral, apical septum, anterioseptum and anterior walls. Compared to previous study from 7-1-2020 no changes noted in left ventricular function. Moderate mitral regurgitation. LIMITED ECHO 6/29/20:    Summary   Technically difficult examination. Limited only f/u for LVEF. and mitral regurgitation. The left ventricular systolic function is severely reduced with an ejection fraction of 25 %. There is hypokinesis of the apex, apical lateral, apical septum and anteroseptum walls. Moderate mitral regurgitation. Surgery: 1/27/20 Urgent coronary artery bypass grafting surgery x1 with pedicled left internal mammary artery to the LAD. Cardiopulmonary bypass with on-pump beating-heart technique, no cross-clamp. Transesophageal echo. Epiaortic ultrasound. [place]-Jurgen catheter placement. Doppler verification of grafts. Bilateral five-level intercostal nerve block with Exparel. Platelet gel application. Sternal plating. Vas-Cath placement. URI 1/24/20:    Summary   Ejection fraction is visually estimated at 35-40%. Moderate to severe mitral regurgitation with multiple jets visualized. ERO estimated at 0.29 cm2. Mild tricuspid regurgitation. The left atrial appendage shows evidence of thrombus formation and low flow velocities. Moderate amount of plaque in the aorta     CARDIAC CATH 1/20/2020:   Left Heart Cath  Dominance : Right   LM: 70-80% short distal stenosis   LAD: 70-80% short ostial stenosis, extending into takeoff of high first diagonal; large proximal to mid stented segment patent with jailed second diagonal  (80% ostial D2) and 70% in stent restenosis of most distal stent; distal to near apical LAD with minimal disease   LCx: 70-80% short ostial stenosis, prior to patent proximal to mid stents    RCA: large, dominant vessel with long 60% proximal to mid stenosis    LVEDP: 4 mmHG  LVG not done due to CKD. Impression/Recommendations:  Diabetic with previous LAD and LCx stenting in 2018 returns with unstable angina, in stent restenosis and Left Main bifurcation disease. Recommend CTS evaluation inpatient to discuss surgical revascularization option. Hold Clopidogrel. ECHO 4/3/19:    Summary   LV systolic function is mildly reduced with EF estimated at 40-45%. There is severe HK of the apex and apical-septal segment. Normal left ventricular diastolic filling pressure. Mild mitral regurgitation. Systolic pulmonary artery pressure (SPAP) estimated at 32mmHg (RA pressure 3mmHg). [facility] 3/26/18 [employer] promus premier [id number] CCx and [id number] CCx. Active Problems:    Acute on chronic combined systolic and diastolic congestive heart failure (HCC)    Right upper quadrant pain    Acute on chronic right-sided heart failure (HCC)  Resolved Problems:    * No resolved hospital problems.  *      Assessment/Plan:  · CHF, acute on chronic combined and right heart failure - remains fluid overloaded  · Cardiomyopathy, ischemic - EF 25% by limited echo, unchanged; not on ACEi/ARB/ARNI due to hypotension and CKD  · CAD s/p PCI's and CABG - stress test with lateral reversible ischemia, CABG to LAD, LCx and RCA still with disease, on high dose statin, DAPT  · Hypotension - on midodrine, stable  · HLD   · CKD3  · DM  · Jaundice  · Left subclavian stenosis     Plan:   Aspirin and statin  Continue Spironolactone (aldactone)  On midodrine 5mg BID  Unable to start beta blocker due to hypotension  Unable to start ACEi/ARB/ARNI due to hypotension and CKD  Restart torsemide 50mg PO BID   Decrease dobutamine in 1/2 for the next 24 hours   Daily weights, daily BMP    cardiomems reading         Carla Browne CNP, 8/28/2020, 2:28 PM

## 2020-08-28 NOTE — PROGRESS NOTES
Vascular    L Subclavian stenosis- jeopardizing L arm bypass and LIMA graft. Will schedule for Stenting early next week.

## 2020-08-29 LAB
ALBUMIN SERPL-MCNC: 4 G/DL (ref 3.4–5)
ANION GAP SERPL CALCULATED.3IONS-SCNC: 17 MMOL/L (ref 3–16)
BUN BLDV-MCNC: 40 MG/DL (ref 7–20)
CALCIUM SERPL-MCNC: 9.8 MG/DL (ref 8.3–10.6)
CHLORIDE BLD-SCNC: 84 MMOL/L (ref 99–110)
CO2: 30 MMOL/L (ref 21–32)
CREAT SERPL-MCNC: 2.1 MG/DL (ref 0.6–1.1)
GFR AFRICAN AMERICAN: 29
GFR NON-AFRICAN AMERICAN: 24
GLUCOSE BLD-MCNC: 119 MG/DL (ref 70–99)
GLUCOSE BLD-MCNC: 123 MG/DL (ref 70–99)
GLUCOSE BLD-MCNC: 128 MG/DL (ref 70–99)
GLUCOSE BLD-MCNC: 87 MG/DL (ref 70–99)
GLUCOSE BLD-MCNC: 95 MG/DL (ref 70–99)
PERFORMED ON: ABNORMAL
PERFORMED ON: NORMAL
PHOSPHORUS: 3.2 MG/DL (ref 2.5–4.9)
POTASSIUM SERPL-SCNC: 3.6 MMOL/L (ref 3.5–5.1)
SODIUM BLD-SCNC: 131 MMOL/L (ref 136–145)

## 2020-08-29 PROCEDURE — 6370000000 HC RX 637 (ALT 250 FOR IP): Performed by: NURSE PRACTITIONER

## 2020-08-29 PROCEDURE — 2580000003 HC RX 258: Performed by: INTERNAL MEDICINE

## 2020-08-29 PROCEDURE — 6360000002 HC RX W HCPCS: Performed by: NURSE PRACTITIONER

## 2020-08-29 PROCEDURE — 6370000000 HC RX 637 (ALT 250 FOR IP): Performed by: INTERNAL MEDICINE

## 2020-08-29 PROCEDURE — 99233 SBSQ HOSP IP/OBS HIGH 50: CPT | Performed by: INTERNAL MEDICINE

## 2020-08-29 PROCEDURE — 94640 AIRWAY INHALATION TREATMENT: CPT

## 2020-08-29 PROCEDURE — 80069 RENAL FUNCTION PANEL: CPT

## 2020-08-29 PROCEDURE — 2060000000 HC ICU INTERMEDIATE R&B

## 2020-08-29 RX ORDER — METOLAZONE 2.5 MG/1
5 TABLET ORAL ONCE
Status: COMPLETED | OUTPATIENT
Start: 2020-08-29 | End: 2020-08-29

## 2020-08-29 RX ORDER — GUAIFENESIN 600 MG/1
600 TABLET, EXTENDED RELEASE ORAL 2 TIMES DAILY
Status: DISCONTINUED | OUTPATIENT
Start: 2020-08-29 | End: 2020-09-02 | Stop reason: HOSPADM

## 2020-08-29 RX ORDER — TOLVAPTAN 30 MG/1
30 TABLET ORAL ONCE
Status: COMPLETED | OUTPATIENT
Start: 2020-08-29 | End: 2020-08-29

## 2020-08-29 RX ADMIN — BENZTROPINE MESYLATE 1 MG: 1 TABLET ORAL at 21:17

## 2020-08-29 RX ADMIN — MORPHINE SULFATE 2 MG: 2 INJECTION, SOLUTION INTRAMUSCULAR; INTRAVENOUS at 19:51

## 2020-08-29 RX ADMIN — Medication 10 ML: at 00:10

## 2020-08-29 RX ADMIN — TIOTROPIUM BROMIDE INHALATION SPRAY 2 PUFF: 3.12 SPRAY, METERED RESPIRATORY (INHALATION) at 07:41

## 2020-08-29 RX ADMIN — LAMOTRIGINE 200 MG: 100 TABLET ORAL at 21:16

## 2020-08-29 RX ADMIN — PANTOPRAZOLE SODIUM 40 MG: 40 TABLET, DELAYED RELEASE ORAL at 09:22

## 2020-08-29 RX ADMIN — ARIPIPRAZOLE 10 MG: 10 TABLET ORAL at 09:23

## 2020-08-29 RX ADMIN — POTASSIUM CHLORIDE 40 MEQ: 20 TABLET, EXTENDED RELEASE ORAL at 21:16

## 2020-08-29 RX ADMIN — MAGNESIUM OXIDE TAB 400 MG (241.3 MG ELEMENTAL MG) 400 MG: 400 (241.3 MG) TAB at 09:22

## 2020-08-29 RX ADMIN — POTASSIUM CHLORIDE 40 MEQ: 20 TABLET, EXTENDED RELEASE ORAL at 19:00

## 2020-08-29 RX ADMIN — FERROUS SULFATE TAB 325 MG (65 MG ELEMENTAL FE) 325 MG: 325 (65 FE) TAB at 19:00

## 2020-08-29 RX ADMIN — ATORVASTATIN CALCIUM 80 MG: 80 TABLET, FILM COATED ORAL at 21:17

## 2020-08-29 RX ADMIN — TRAMADOL HYDROCHLORIDE 50 MG: 50 TABLET, FILM COATED ORAL at 06:07

## 2020-08-29 RX ADMIN — MIDODRINE HYDROCHLORIDE 5 MG: 5 TABLET ORAL at 15:24

## 2020-08-29 RX ADMIN — SPIRONOLACTONE 50 MG: 25 TABLET ORAL at 09:22

## 2020-08-29 RX ADMIN — INSULIN GLARGINE 18 UNITS: 100 INJECTION, SOLUTION SUBCUTANEOUS at 21:22

## 2020-08-29 RX ADMIN — LAMOTRIGINE 200 MG: 100 TABLET ORAL at 09:23

## 2020-08-29 RX ADMIN — CLOPIDOGREL BISULFATE 75 MG: 75 TABLET ORAL at 09:22

## 2020-08-29 RX ADMIN — TRAMADOL HYDROCHLORIDE 50 MG: 50 TABLET, FILM COATED ORAL at 15:35

## 2020-08-29 RX ADMIN — MORPHINE SULFATE 2 MG: 2 INJECTION, SOLUTION INTRAMUSCULAR; INTRAVENOUS at 00:10

## 2020-08-29 RX ADMIN — TOLVAPTAN 30 MG: 30 TABLET ORAL at 15:24

## 2020-08-29 RX ADMIN — GUAIFENESIN 600 MG: 600 TABLET, EXTENDED RELEASE ORAL at 15:24

## 2020-08-29 RX ADMIN — ASPIRIN 81 MG: 81 TABLET, COATED ORAL at 09:22

## 2020-08-29 RX ADMIN — DOCUSATE SODIUM 100 MG: 100 CAPSULE, LIQUID FILLED ORAL at 09:22

## 2020-08-29 RX ADMIN — MORPHINE SULFATE 2 MG: 2 INJECTION, SOLUTION INTRAMUSCULAR; INTRAVENOUS at 09:22

## 2020-08-29 RX ADMIN — POTASSIUM CHLORIDE 40 MEQ: 20 TABLET, EXTENDED RELEASE ORAL at 09:23

## 2020-08-29 RX ADMIN — POLYETHYLENE GLYCOL 3350 17 G: 17 POWDER, FOR SOLUTION ORAL at 09:23

## 2020-08-29 RX ADMIN — MIDODRINE HYDROCHLORIDE 5 MG: 5 TABLET ORAL at 09:22

## 2020-08-29 RX ADMIN — TORSEMIDE 50 MG: 100 TABLET ORAL at 19:51

## 2020-08-29 RX ADMIN — GUAIFENESIN 600 MG: 600 TABLET, EXTENDED RELEASE ORAL at 21:17

## 2020-08-29 RX ADMIN — TORSEMIDE 50 MG: 100 TABLET ORAL at 05:56

## 2020-08-29 RX ADMIN — PANTOPRAZOLE SODIUM 40 MG: 40 TABLET, DELAYED RELEASE ORAL at 15:24

## 2020-08-29 RX ADMIN — BUSPIRONE HYDROCHLORIDE 30 MG: 5 TABLET ORAL at 21:16

## 2020-08-29 RX ADMIN — Medication 10 ML: at 09:23

## 2020-08-29 RX ADMIN — METOLAZONE 5 MG: 2.5 TABLET ORAL at 15:26

## 2020-08-29 ASSESSMENT — PAIN DESCRIPTION - LOCATION
LOCATION: BACK;ABDOMEN;GROIN
LOCATION: GENERALIZED;BACK;ABDOMEN

## 2020-08-29 ASSESSMENT — PAIN DESCRIPTION - PAIN TYPE
TYPE: ACUTE PAIN;CHRONIC PAIN
TYPE: ACUTE PAIN;CHRONIC PAIN

## 2020-08-29 ASSESSMENT — PAIN DESCRIPTION - ORIENTATION
ORIENTATION: RIGHT;LEFT
ORIENTATION: RIGHT;LEFT

## 2020-08-29 ASSESSMENT — PAIN SCALES - GENERAL
PAINLEVEL_OUTOF10: 8
PAINLEVEL_OUTOF10: 9
PAINLEVEL_OUTOF10: 8
PAINLEVEL_OUTOF10: 8
PAINLEVEL_OUTOF10: 7
PAINLEVEL_OUTOF10: 8

## 2020-08-29 ASSESSMENT — PAIN DESCRIPTION - ONSET
ONSET: ON-GOING
ONSET: ON-GOING

## 2020-08-29 ASSESSMENT — PAIN DESCRIPTION - PROGRESSION
CLINICAL_PROGRESSION: NOT CHANGED
CLINICAL_PROGRESSION: NOT CHANGED

## 2020-08-29 ASSESSMENT — PAIN DESCRIPTION - FREQUENCY
FREQUENCY: CONTINUOUS
FREQUENCY: CONTINUOUS

## 2020-08-29 ASSESSMENT — PAIN DESCRIPTION - DESCRIPTORS
DESCRIPTORS: ACHING;RADIATING
DESCRIPTORS: ACHING;SHARP;RADIATING

## 2020-08-29 NOTE — PROGRESS NOTES
[address]   Progress Note  Cardiology    CC: sob    HPI: remains sob. No chest pain    Medications/Labs all Reviewed    Lab Results   Component Value Date    WBC 5.8 08/28/2020    HGB 9.8 (L) 08/28/2020    HCT 30.2 (L) 08/28/2020    MCV 83.0 08/28/2020     08/28/2020     Lab Results   Component Value Date    CREATININE 2.1 (H) 08/29/2020    BUN 40 (H) 08/29/2020     (L) 08/29/2020    K 3.6 08/29/2020    CL 84 (L) 08/29/2020    CO2 30 08/29/2020     Lab Results   Component Value Date    INR 1.23 (H) 08/18/2020    PROTIME 14.2 (H) 08/18/2020        Physical Examination:    /78   Pulse 86   Temp 97.5 °F (36.4 °C) (Oral)   Resp 17   Ht 5' 4.5\" (1.638 m)   Wt 235 lb 6.4 oz (106.8 kg)   LMP 10/24/2012   SpO2 96%   BMI 39.78 kg/m²      Respiratory:  · Resp Assessment: Normal respiratory effort  · Resp Auscultation: few rales, decreased bs bilaterally   Cardiovascular:  · Auscultation: regular rate and rhythm, normal S1S2, no murmur, rub or gallop  · Palpation:  Nl PMI  · JVP:  difficult to assess due to body habitus  · Extremities: + Edema  Abdomen:  · Soft, non-tender  · Normal bowel sounds  Extremities:  ·  No Cyanosis or Clubbing  Neurological/Psychiatric:  · Oriented to time, place, and person  · Non-anxious  Skin:   · Warm and dry      Assessment:    Acute on chronic combined systolic and diastolic congestive heart failure - improving w/ >20 liter diuresis  Right upper quadrant pain - not cardiac  Acute on chronic right-sided heart failure   CAD - CABG/PCI in past. stable w/o angina  ICMP  PAD  CKD - relatively stable w/ diuresis  DM  COPD  CardioMEMs implant (2/2019)  Obesity  HLD  Hypotension - improved and more stable    Plan  Aspirin and statin  Continue Spironolactone   Cont midodrine to maintain adequate BP  Attempt low dose BBlk as BP stable  No ACEi/ARB/ARNI due to hypotension and CKD.  Reconsider in future  Cont torsemide 50mg PO BID   Cont dobutamine another 24 hours   Daily weights, daily BMP            Ольга Mccoy MD, 8/29/2020 11:44 AM

## 2020-08-29 NOTE — PROGRESS NOTES
Hospitalist Progress Note      PCP: Jimmy Yoder PA-C    Date of Admission: 8/18/2020    Chief Complaint: RUQ pain    Hospital Course: The patient is a pleasant, chronically ill-appearing 62 Y F with a h/o COPD, HTN, HLD, DM2, CAD s/p stents and CABG, ischemic cardiomyopathy, and CHF.  She has extensive vascular disease and is s/p a LUE bypass, a LLE angioplasty (then again when it reoccluded), and a RLE bypass.  She continues to smoke.  She was recently diagnosed with L renal artery stenosis and underwent a L renal artery angioplasty and stent here on 8/4.  She was discharged home that day but returned to the ED a few hours later with L flank pain and was admitted with a L perinephric subcapsular hematoma.  She required 2u pRBCs and a coil embolization of a L renal artery branch on 8/5. [facility] hospitalization was complicated by SHAUNNA and hyponatremia, and with the help of nephrology each of these issues improved a little by the time she was discharged on 8/12.               Now the patient presents with new sharp RUQ pain which radiates to her R posterior flank, R chest, and R shoulder.  It started around 8pm last night, and she denies having any pain in this area previously.  It hurts her badly to breath or move.  She is very tender in the RUQ.  She is s/p cholecystectomy.  Her LFTs have been newly abnormal for the last couple weeks. Subjective: No new complaints. On room air.           Medications:  Reviewed    Infusion Medications    DOBUTamine 4.3 mcg/kg/min (08/28/20 9887)    dextrose       Scheduled Medications    metOLazone  5 mg Oral Once    tolvaptan  30 mg Oral Once    guaiFENesin  600 mg Oral BID    polyethylene glycol  17 g Oral Daily    potassium chloride  40 mEq Oral TID    torsemide  50 mg Oral BID    sodium chloride flush  10 mL Intravenous 2 times per day    docusate sodium  100 mg Oral Daily    ARIPiprazole  10 mg Oral Daily    aspirin EC  81 mg Oral Daily    benztropine  1 mg Oral Nightly    buprenorphine-naloxone  1 Film Sublingual BID    clopidogrel  75 mg Oral Daily    ferrous sulfate  325 mg Oral BID     lamoTRIgine  200 mg Oral BID    magnesium oxide  400 mg Oral Daily    midodrine  5 mg Oral BID     pantoprazole  40 mg Oral BID    spironolactone  50 mg Oral Daily    tiotropium  2 puff Inhalation Daily    insulin lispro  0-12 Units Subcutaneous TID     insulin lispro  0-6 Units Subcutaneous Nightly    busPIRone  30 mg Oral BID    insulin glargine  18 Units Subcutaneous Nightly    atorvastatin  80 mg Oral Nightly    sodium chloride flush  10 mL Intravenous 2 times per day     PRN Meds: sodium chloride flush, acetaminophen, morphine, ondansetron, magnesium hydroxide, perflutren lipid microspheres, traMADol, traZODone, glucose, dextrose, glucagon (rDNA), dextrose, sodium chloride flush, acetaminophen **OR** acetaminophen, albuterol      Intake/Output Summary (Last 24 hours) at 8/29/2020 1034  Last data filed at 8/29/2020 5467  Gross per 24 hour   Intake 939.13 ml   Output 2050 ml   Net -1110.87 ml       Physical Exam Performed:    /74   Pulse 90   Temp 98.4 °F (36.9 °C) (Oral)   Resp 19   Ht 5' 4.5\" (1.638 m)   Wt 235 lb 6.4 oz (106.8 kg)   LMP 10/24/2012   SpO2 95%   BMI 39.78 kg/m²     General appearance: No apparent distress, appears stated age and cooperative. HEENT: Pupils equal, round, and reactive to light. Conjunctivae/corneas clear. Neck: Supple, with full range of motion. No jugular venous distention. Trachea midline. Respiratory:  Normal respiratory effort. Clear to auscultation, bilaterally without Rales/Wheezes/Rhonchi. Cardiovascular: Regular rate and rhythm with normal S1/S2 without murmurs, rubs or gallops. Abdomen: Soft, non-tender, non-distended with normal bowel sounds. Musculoskeletal: No clubbing, cyanosis bilaterally. Full range of motion without deformity. 1+ BLE edema.     Skin: Skin color, texture, turgor normal.  No SCDs  Diet: DIET CARDIAC; No Added Salt (3-4 GM);  Daily Fluid Restriction: 2000 ml  Code Status: Full Code    PT/OT Eval Status: not indicated    Dispo - likely another here through next week for subclavian stenting    Caesar Sheets, APRN - CNP

## 2020-08-30 LAB
ALBUMIN SERPL-MCNC: 4.4 G/DL (ref 3.4–5)
ANION GAP SERPL CALCULATED.3IONS-SCNC: 16 MMOL/L (ref 3–16)
BUN BLDV-MCNC: 42 MG/DL (ref 7–20)
CALCIUM SERPL-MCNC: 10 MG/DL (ref 8.3–10.6)
CHLORIDE BLD-SCNC: 87 MMOL/L (ref 99–110)
CO2: 28 MMOL/L (ref 21–32)
CREAT SERPL-MCNC: 2.3 MG/DL (ref 0.6–1.1)
GFR AFRICAN AMERICAN: 26
GFR NON-AFRICAN AMERICAN: 22
GLUCOSE BLD-MCNC: 102 MG/DL (ref 70–99)
GLUCOSE BLD-MCNC: 124 MG/DL (ref 70–99)
GLUCOSE BLD-MCNC: 130 MG/DL (ref 70–99)
GLUCOSE BLD-MCNC: 148 MG/DL (ref 70–99)
GLUCOSE BLD-MCNC: 161 MG/DL (ref 70–99)
MAGNESIUM: 2.8 MG/DL (ref 1.8–2.4)
PERFORMED ON: ABNORMAL
PHOSPHORUS: 3.4 MG/DL (ref 2.5–4.9)
POTASSIUM SERPL-SCNC: 4.5 MMOL/L (ref 3.5–5.1)
SODIUM BLD-SCNC: 131 MMOL/L (ref 136–145)

## 2020-08-30 PROCEDURE — 6370000000 HC RX 637 (ALT 250 FOR IP): Performed by: INTERNAL MEDICINE

## 2020-08-30 PROCEDURE — 99233 SBSQ HOSP IP/OBS HIGH 50: CPT | Performed by: INTERNAL MEDICINE

## 2020-08-30 PROCEDURE — 6370000000 HC RX 637 (ALT 250 FOR IP): Performed by: NURSE PRACTITIONER

## 2020-08-30 PROCEDURE — 6360000002 HC RX W HCPCS: Performed by: INTERNAL MEDICINE

## 2020-08-30 PROCEDURE — 6360000002 HC RX W HCPCS: Performed by: NURSE PRACTITIONER

## 2020-08-30 PROCEDURE — 83735 ASSAY OF MAGNESIUM: CPT

## 2020-08-30 PROCEDURE — 94640 AIRWAY INHALATION TREATMENT: CPT

## 2020-08-30 PROCEDURE — 2060000000 HC ICU INTERMEDIATE R&B

## 2020-08-30 PROCEDURE — 80069 RENAL FUNCTION PANEL: CPT

## 2020-08-30 RX ADMIN — BUSPIRONE HYDROCHLORIDE 30 MG: 5 TABLET ORAL at 08:28

## 2020-08-30 RX ADMIN — GUAIFENESIN 600 MG: 600 TABLET, EXTENDED RELEASE ORAL at 08:28

## 2020-08-30 RX ADMIN — BENZTROPINE MESYLATE 1 MG: 1 TABLET ORAL at 21:11

## 2020-08-30 RX ADMIN — ARIPIPRAZOLE 10 MG: 10 TABLET ORAL at 08:28

## 2020-08-30 RX ADMIN — LAMOTRIGINE 200 MG: 100 TABLET ORAL at 08:28

## 2020-08-30 RX ADMIN — POTASSIUM CHLORIDE 40 MEQ: 20 TABLET, EXTENDED RELEASE ORAL at 21:11

## 2020-08-30 RX ADMIN — TORSEMIDE 50 MG: 100 TABLET ORAL at 06:22

## 2020-08-30 RX ADMIN — POTASSIUM CHLORIDE 40 MEQ: 20 TABLET, EXTENDED RELEASE ORAL at 14:20

## 2020-08-30 RX ADMIN — PANTOPRAZOLE SODIUM 40 MG: 40 TABLET, DELAYED RELEASE ORAL at 16:50

## 2020-08-30 RX ADMIN — CLOPIDOGREL BISULFATE 75 MG: 75 TABLET ORAL at 08:28

## 2020-08-30 RX ADMIN — TRAMADOL HYDROCHLORIDE 50 MG: 50 TABLET, FILM COATED ORAL at 08:28

## 2020-08-30 RX ADMIN — MIDODRINE HYDROCHLORIDE 5 MG: 5 TABLET ORAL at 08:28

## 2020-08-30 RX ADMIN — TORSEMIDE 50 MG: 100 TABLET ORAL at 16:50

## 2020-08-30 RX ADMIN — ASPIRIN 81 MG: 81 TABLET, COATED ORAL at 08:28

## 2020-08-30 RX ADMIN — POTASSIUM CHLORIDE 40 MEQ: 20 TABLET, EXTENDED RELEASE ORAL at 08:28

## 2020-08-30 RX ADMIN — BUSPIRONE HYDROCHLORIDE 30 MG: 5 TABLET ORAL at 21:14

## 2020-08-30 RX ADMIN — TRAMADOL HYDROCHLORIDE 50 MG: 50 TABLET, FILM COATED ORAL at 16:56

## 2020-08-30 RX ADMIN — TIOTROPIUM BROMIDE INHALATION SPRAY 2 PUFF: 3.12 SPRAY, METERED RESPIRATORY (INHALATION) at 07:39

## 2020-08-30 RX ADMIN — ATORVASTATIN CALCIUM 80 MG: 80 TABLET, FILM COATED ORAL at 21:11

## 2020-08-30 RX ADMIN — INSULIN GLARGINE 18 UNITS: 100 INJECTION, SOLUTION SUBCUTANEOUS at 21:16

## 2020-08-30 RX ADMIN — FERROUS SULFATE TAB 325 MG (65 MG ELEMENTAL FE) 325 MG: 325 (65 FE) TAB at 16:50

## 2020-08-30 RX ADMIN — LAMOTRIGINE 200 MG: 100 TABLET ORAL at 21:11

## 2020-08-30 RX ADMIN — DOBUTAMINE HYDROCHLORIDE 1.25 MCG/KG/MIN: 200 INJECTION INTRAVENOUS at 06:23

## 2020-08-30 RX ADMIN — MAGNESIUM OXIDE TAB 400 MG (241.3 MG ELEMENTAL MG) 400 MG: 400 (241.3 MG) TAB at 08:28

## 2020-08-30 RX ADMIN — MIDODRINE HYDROCHLORIDE 5 MG: 5 TABLET ORAL at 17:53

## 2020-08-30 RX ADMIN — PANTOPRAZOLE SODIUM 40 MG: 40 TABLET, DELAYED RELEASE ORAL at 08:28

## 2020-08-30 RX ADMIN — ONDANSETRON 4 MG: 2 INJECTION INTRAMUSCULAR; INTRAVENOUS at 09:35

## 2020-08-30 RX ADMIN — FERROUS SULFATE TAB 325 MG (65 MG ELEMENTAL FE) 325 MG: 325 (65 FE) TAB at 08:28

## 2020-08-30 RX ADMIN — DOCUSATE SODIUM 100 MG: 100 CAPSULE, LIQUID FILLED ORAL at 08:31

## 2020-08-30 RX ADMIN — GUAIFENESIN 600 MG: 600 TABLET, EXTENDED RELEASE ORAL at 21:11

## 2020-08-30 RX ADMIN — SPIRONOLACTONE 50 MG: 25 TABLET ORAL at 08:28

## 2020-08-30 RX ADMIN — INSULIN LISPRO 2 UNITS: 100 INJECTION, SOLUTION INTRAVENOUS; SUBCUTANEOUS at 16:51

## 2020-08-30 RX ADMIN — POLYETHYLENE GLYCOL 3350 17 G: 17 POWDER, FOR SOLUTION ORAL at 08:28

## 2020-08-30 ASSESSMENT — PAIN DESCRIPTION - PAIN TYPE
TYPE: ACUTE PAIN
TYPE: ACUTE PAIN

## 2020-08-30 ASSESSMENT — PAIN SCALES - GENERAL
PAINLEVEL_OUTOF10: 8
PAINLEVEL_OUTOF10: 7
PAINLEVEL_OUTOF10: 8
PAINLEVEL_OUTOF10: 8

## 2020-08-30 ASSESSMENT — PAIN DESCRIPTION - LOCATION
LOCATION: ABDOMEN;BACK
LOCATION: ABDOMEN;BACK

## 2020-08-30 NOTE — PROGRESS NOTES
Hospitalist Progress Note      PCP: Wally Hinkle PA-C    Date of Admission: 8/18/2020    Chief Complaint: RUQ pain    Hospital Course: The patient is a pleasant, chronically ill-appearing 62 Y F with a h/o COPD, HTN, HLD, DM2, CAD s/p stents and CABG, ischemic cardiomyopathy, and CHF.  She has extensive vascular disease and is s/p a LUE bypass, a LLE angioplasty (then again when it reoccluded), and a RLE bypass.  She continues to smoke.  She was recently diagnosed with L renal artery stenosis and underwent a L renal artery angioplasty and stent here on 8/4.  She was discharged home that day but returned to the ED a few hours later with L flank pain and was admitted with a L perinephric subcapsular hematoma.  She required 2u pRBCs and a coil embolization of a L renal artery branch on 8/5. [facility] hospitalization was complicated by SHAUNNA and hyponatremia, and with the help of nephrology each of these issues improved a little by the time she was discharged on 8/12.               Now the patient presents with new sharp RUQ pain which radiates to her R posterior flank, R chest, and R shoulder.  It started around 8pm last night, and she denies having any pain in this area previously.  It hurts her badly to breath or move.  She is very tender in the RUQ.  She is s/p cholecystectomy.  Her LFTs have been newly abnormal for the last couple weeks. Subjective: No new complaints. Cr bumped to 2.3.           Medications:  Reviewed    Infusion Medications    DOBUTamine 1.25 mcg/kg/min (08/30/20 0766)    dextrose       Scheduled Medications    guaiFENesin  600 mg Oral BID    polyethylene glycol  17 g Oral Daily    potassium chloride  40 mEq Oral TID    torsemide  50 mg Oral BID    sodium chloride flush  10 mL Intravenous 2 times per day    docusate sodium  100 mg Oral Daily    ARIPiprazole  10 mg Oral Daily    aspirin EC  81 mg Oral Daily    benztropine  1 mg Oral Nightly    buprenorphine-naloxone  1 Film Sublingual BID    clopidogrel  75 mg Oral Daily    ferrous sulfate  325 mg Oral BID     lamoTRIgine  200 mg Oral BID    magnesium oxide  400 mg Oral Daily    midodrine  5 mg Oral BID     pantoprazole  40 mg Oral BID    spironolactone  50 mg Oral Daily    tiotropium  2 puff Inhalation Daily    insulin lispro  0-12 Units Subcutaneous TID     insulin lispro  0-6 Units Subcutaneous Nightly    busPIRone  30 mg Oral BID    insulin glargine  18 Units Subcutaneous Nightly    atorvastatin  80 mg Oral Nightly    sodium chloride flush  10 mL Intravenous 2 times per day     PRN Meds: sodium chloride flush, acetaminophen, morphine, ondansetron, magnesium hydroxide, perflutren lipid microspheres, traMADol, traZODone, glucose, dextrose, glucagon (rDNA), dextrose, sodium chloride flush, acetaminophen **OR** acetaminophen, albuterol      Intake/Output Summary (Last 24 hours) at 8/30/2020 1423  Last data filed at 8/30/2020 1336  Gross per 24 hour   Intake 344.66 ml   Output 3750 ml   Net -3405.34 ml       Physical Exam Performed:    /83   Pulse 89   Temp 97.5 °F (36.4 °C) (Oral)   Resp 18   Ht 5' 4.5\" (1.638 m)   Wt 234 lb (106.1 kg)   LMP 10/24/2012   SpO2 97%   BMI 39.55 kg/m²     General appearance: No apparent distress, appears stated age and cooperative. HEENT: Pupils equal, round, and reactive to light. Conjunctivae/corneas clear. Neck: Supple, with full range of motion. No jugular venous distention. Trachea midline. Respiratory:  Normal respiratory effort. Clear to auscultation, bilaterally without Rales/Wheezes/Rhonchi. Cardiovascular: Regular rate and rhythm with normal S1/S2 without murmurs, rubs or gallops. Abdomen: Soft, non-tender, non-distended with normal bowel sounds. Musculoskeletal: No clubbing, cyanosis bilaterally. Full range of motion without deformity. 1+ BLE edema. Skin: Skin color, texture, turgor normal.  No rashes or lesions.   Neurologic:  Neurovascularly intact without any focal sensory/motor deficits. Cranial nerves: II-XII intact, grossly non-focal.  Psychiatric: Alert and oriented, thought content appropriate, normal insight  Capillary Refill: Brisk,< 3 seconds   Peripheral Pulses: +2 palpable, equal bilaterally       Labs:   Recent Labs     08/28/20  0441   WBC 5.8   HGB 9.8*   HCT 30.2*        Recent Labs     08/28/20  0441 08/29/20  0433 08/30/20  0425   * 131* 131*   K 3.3* 3.6 4.5   CL 83* 84* 87*   CO2 30 30 28   BUN 40* 40* 42*   CREATININE 2.1* 2.1* 2.3*   CALCIUM 9.7 9.8 10.0   PHOS 3.2 3.2 3.4     No results for input(s): AST, ALT, BILIDIR, BILITOT, ALKPHOS in the last 72 hours. No results for input(s): INR in the last 72 hours. No results for input(s): Liset Kimi in the last 72 hours. Urinalysis:      Lab Results   Component Value Date    NITRU Negative 08/18/2020    WBCUA 0-2 08/18/2020    BACTERIA Rare 08/18/2020    RBCUA 0-2 08/18/2020    BLOODU Negative 08/18/2020    SPECGRAV 1.010 08/18/2020    GLUCOSEU Negative 08/18/2020       Radiology:  VL DUP CAROTID BILATERAL   Final Result      US GALLBLADDER RUQ   Final Result   Status post cholecystectomy, with normal caliber common duct for the post   cholecystectomy state. Fatty liver. NM Cardiac Stress Test Nuclear Imaging   Final Result      NM LUNG SCAN PERFUSION ONLY   Final Result   Low probability for pulmonary embolism.                  Assessment/Plan:    Active Hospital Problems    Diagnosis    Stenosis of left subclavian artery (HCC) [I77.1]    CKD (chronic kidney disease) stage 4, GFR 15-29 ml/min (HCC) [N18.4]    Acute on chronic right-sided heart failure (HCC) [I50.813]    Right upper quadrant pain [R10.11]    Acute on chronic combined systolic and diastolic congestive heart failure (HCC) [I50.43]     RUQ pain, suspect due to congestive hepatopathy, due to acute on chronic systolic CHF  - she had been on spironolactone and PRN metolazone.  Then last admission she was discharged with spironolactone and daily torsemide.    - Lasix gtt stopped 8/27. Now on torsemide 50 mg BID and spironolactone 50 mg daily. - GI consulted and following.    - RUQ US unremarkable. - has a history of alcoholism - sober since 2006 and on Suboxone. - limit narcotics if possible.     Acute on chronic combined systolic and diastolic heart failure with fluid overload, LVEF of 25%  - not on ACE inhibitor or ARB due to CKD. - has implanted CardioMems unit   - no ACE/ARB/ARNI due to unstable renal status.    - cardiology and nephrology consulted and following.  - daily weights, I&O.  - diuretics as above. Started on Dobutamine gtt 8/24, which she seems to be responding well to. CKD3  - baseline Cr is probably about 1.5   Monitor during diuresis.    - nephrology consulted and following.     Hyponatremia  - it looks like her baseline is in the high 120's, even when accounting for hyperglycemia.  After a week of aggressive management, including multiple doses of tolvaptan, she still was only discharged at 126 last time.    - nephrology input noted. - tolvaptan given 8/27. CAD  - aspirin, clopidogrel.  OK to resume high dose statin for now. Monitor for worsening LFTs. - stress test was abnormal, so will need LHC at some point during her stay, possibly on Thursday.       DM2  - adjusted insulin regimen while here.  Recent A1c 7.2.     Anemia - due to blood loss. - 14.7 cm L perinephric subcapsular hematoma was stable on repeat CT on this admission.   - continued on iron. Hypokalemia - on TID replacement. - follow BMP. Morbid obesity - body mass index is 42.72 kg/m². Complicating assessment and treatment. Placing patient at risk for multiple co-morbidities as well as early death and contributing to the patient's presentation. Counseled on weight loss    Severe left subclavian stenosis - found on cardiac cath.   - vascular consulted and following.  - planning for subclavian stenting next week. DVT Prophylaxis: SCDs  Diet: DIET CARDIAC; No Added Salt (3-4 GM);  Daily Fluid Restriction: 2000 ml  Code Status: Full Code    PT/OT Eval Status: not indicated    Dispo - likely another here through next week for subclavian stenting    Greg Cerda, APRN - CNP

## 2020-08-30 NOTE — PLAN OF CARE
Patient showed no signs of distress though out shift. Respirations and oxygen saturation remained within normal limits for patient.

## 2020-08-30 NOTE — PROGRESS NOTES
Patient's EF (Ejection Fraction) is less than 40%    Heart Failure Medications:  Diuretics[de-identified] Torsemide and furosemide      (One of the following REQUIRED for EF <40%/SYSTOLIC FAILURE but MAY be used in EF% >40%/DIASTOLIC FAILURE)        ACE[de-identified] None        ARB[de-identified] None        ARNI[de-identified] None    (Beta Blockers)   NON- Evidenced Based Beta Blocker (for EF% >40%/DIASTOLIC FAILURE): None     Evidenced Based Beta Blocker::(REQUIRED for EF% <40%/SYSTOLIC FAILURE) None  . .................................................................................................................................................. Patient's weights and intake/output reviewed: Yes    Patient's Last Weight: 106.1 kg obtained by standing scale. Difference of 1 lbs 6.4 oz less than last documented weight. Intake/Output Summary (Last 24 hours) at 8/30/2020 1731  Last data filed at 8/30/2020 1336  Gross per 24 hour   Intake 344.66 ml   Output 3750 ml   Net -3405.34 ml       Comorbidities Reviewed Yes    Patient has a past medical history of Acute kidney injury ([address] Acute on chronic congestive heart failure ([address] Alcohol dependence ([address] Bipolar 1 disorder ([address] CAD (coronary artery disease), Cardiomyopathy ([address] Cellulitis, CHF (congestive heart failure) ([address] COPD (chronic obstructive pulmonary disease) ([address] Diabetic ulcer of left foot associated with type 2 diabetes mellitus ([address] Diabetic ulcer of toe of right foot associated with type 2 diabetes mellitus ([address] GERD (gastroesophageal reflux disease), High cholesterol, HTN (hypertension), Kidney disease, chronic, stage II (mild, EGFR 60+ ml/min), MI (myocardial infarction) ([facility] Positive FIT (fecal immunochemical test), Pulmonary nodule, and PVD (peripheral vascular disease) ([facility]      >>For CHF and Comorbidity documentation on Education Time and Topics, please see Education Tab    Progressive Mobility Assessment:  What is this patient's Current Level of Mobility?: Ambulatory- with Assistance  How was this patient Mobilized today?: Edge of Bed, Up to Chair and  Up to Toilet/Shower                 With Whom? Nurse and PCA                 Level of Difficulty/Assistance: 1x Assist     Pt sitting in bed at this time on room air. Pt denies shortness of breath. Pt with nonpitting lower extremity edema.      Patient and/or Family's stated Goal of Care this Admission: increase activity tolerance and be more comfortable prior to discharge    Patient verbalizes understanding of diabetes       :

## 2020-08-30 NOTE — PLAN OF CARE
Bed alarm on & in place. 2/4 side rails up. Pt wearing non skid footwear. Bed locked & in lowest position. Call light within reach. Bedside table in reach. Fall band on wrist. Patient verbalized understanding of need to call out for assistance.

## 2020-08-30 NOTE — PROGRESS NOTES
[address]   Progress Note  Cardiology    CC: sob    HPI: remains sob, little change. No chest pain    Medications/Labs all Reviewed    Lab Results   Component Value Date    WBC 5.8 08/28/2020    HGB 9.8 (L) 08/28/2020    HCT 30.2 (L) 08/28/2020    MCV 83.0 08/28/2020     08/28/2020     Lab Results   Component Value Date    CREATININE 2.3 (H) 08/30/2020    BUN 42 (H) 08/30/2020     (L) 08/30/2020    K 4.5 08/30/2020    CL 87 (L) 08/30/2020    CO2 28 08/30/2020     Lab Results   Component Value Date    INR 1.23 (H) 08/18/2020    PROTIME 14.2 (H) 08/18/2020        Physical Examination:    /78   Pulse 89   Temp 98.2 °F (36.8 °C) (Oral)   Resp 18   Ht 5' 4.5\" (1.638 m)   Wt 234 lb (106.1 kg)   LMP 10/24/2012   SpO2 95%   BMI 39.55 kg/m²      Respiratory:  · Resp Assessment: Normal respiratory effort  · Resp Auscultation: few rales, decreased bs bilaterally   Cardiovascular:  · Auscultation: regular rate and rhythm, normal S1S2, no murmur, rub or gallop  · Palpation:  Nl PMI  · JVP:  difficult to assess due to body habitus  · Extremities: + Edema  Abdomen:  · Soft, non-tender  · Normal bowel sounds  Extremities:  ·  No Cyanosis or Clubbing  Neurological/Psychiatric:  · Oriented to time, place, and person  · Non-anxious  Skin:   · Warm and dry      Assessment:    Acute on chronic combined systolic and diastolic congestive heart failure - improving w/ >20 liter diuresis. Intravascular volume status difficult to determine on exam.   Right upper quadrant pain - not cardiac  Acute on chronic right-sided heart failure   CAD - CABG/PCI in past. stable w/o angina  ICMP  PAD  CKD - relatively stable w/ diuresis  DM  COPD  CardioMEMs implant (2/2019)  Obesity  HLD  Hypotension - improved and more stable    Plan  Recheck CardioMEMS in am  Cont dobs and diuretics and reassess in am reducing diuresis  Attempting low dose BBlk as BP stable  No ACEi/ARB/ARNI due to hypotension and CKD. Reconsider in future  Cont dobutamine another 24 hours   Daily weights, daily BMP      Jono Pablo MD, 8/30/2020 11:47 AM

## 2020-08-30 NOTE — PROGRESS NOTES
Nephrology Progress Note   [email or web address]      This patient is a 62year old female whom we are following for SHAUNNA on CKD. Subjective: The patient was seen and examined. Continues to diurese on Dobutamine drip. Off lasix gtt. Sodium is 131. Weight is down to 234 lbs. Cr continue to trend up    Family History: No family at bedside  ROS: No fever or chills, + swelling. No SOB. Vitals:  /78   Pulse 89   Temp 98.2 °F (36.8 °C) (Oral)   Resp 18   Ht 5' 4.5\" (1.638 m)   Wt 234 lb (106.1 kg)   LMP 10/24/2012   SpO2 95%   BMI 39.55 kg/m²   I/O last 3 completed shifts: In: 344.7 [P.O.:240; I.V.:104.7]  Out: 2800 [Urine:2800]  No intake/output data recorded. Physical Exam:  Physical Exam  Vitals signs reviewed. Constitutional:       General: She is not in acute distress. Appearance: Normal appearance. HENT:      Head: Normocephalic and atraumatic. Mouth/Throat:      Mouth: Mucous membranes are moist.   Eyes:      General: No scleral icterus. Conjunctiva/sclera: Conjunctivae normal.   Cardiovascular:      Rate and Rhythm: Normal rate. Heart sounds: No friction rub. Pulmonary:      Effort: Pulmonary effort is normal.      Comments: Diminished breath sounds bilateral  Abdominal:      Tenderness: There is no abdominal tenderness. Musculoskeletal:      Right lower leg: Edema present. Left lower leg: Edema present. Neurological:      Mental Status: She is alert.            Medications:   guaiFENesin  600 mg Oral BID    polyethylene glycol  17 g Oral Daily    potassium chloride  40 mEq Oral TID    torsemide  50 mg Oral BID    sodium chloride flush  10 mL Intravenous 2 times per day    docusate sodium  100 mg Oral Daily    ARIPiprazole  10 mg Oral Daily    aspirin EC  81 mg Oral Daily    benztropine  1 mg Oral Nightly    buprenorphine-naloxone  1 Film Sublingual BID    clopidogrel  75 mg Oral Daily    ferrous sulfate  325 mg Oral BID     lamoTRIgine 200 mg Oral BID    magnesium oxide  400 mg Oral Daily    midodrine  5 mg Oral BID     pantoprazole  40 mg Oral BID    spironolactone  50 mg Oral Daily    tiotropium  2 puff Inhalation Daily    insulin lispro  0-12 Units Subcutaneous TID WC    insulin lispro  0-6 Units Subcutaneous Nightly    busPIRone  30 mg Oral BID    insulin glargine  18 Units Subcutaneous Nightly    atorvastatin  80 mg Oral Nightly    sodium chloride flush  10 mL Intravenous 2 times per day         Labs:  Recent Labs     08/28/20 0441   WBC 5.8   HGB 9.8*   HCT 30.2*   MCV 83.0        Recent Labs     08/28/20  0441 08/29/20  0433 08/30/20  0425   * 131* 131*   K 3.3* 3.6 4.5   CL 83* 84* 87*   CO2 30 30 28   GLUCOSE 177* 87 102*   PHOS 3.2 3.2 3.4   MG  --   --  2.80*   BUN 40* 40* 42*   CREATININE 2.1* 2.1* 2.3*   LABGLOM 24* 24* 22*   GFRAA 29* 29* 26*           Assessment/Plan:    SHAUNNA/CKD stage 3.  - Etiology: ATN (hypotension; contrast dye) from last admission  - Data: SCr peaked at 2.0, improved to 1.2mg/dL but now 2.3. Weight is down  - Inotrope added on 8/24/20  - Continue current diuretics, will not augment diuresis further with metolazone today. Midodrine for BP support      CHF - acute on chronic  - On Dobutamine drip. - 2 liter/day fluid restriction  - Strict I's/O's, daily weights   - CardioMEMS monitoring per Cardiology  - On Torsemide, will awaiting cardiology opinion on pressure readings and weight to see if we need additional diuresis      Recent Perinephric hematoma. - Complication of L renal artery angioplasty / stent placement  - S/P coil embolization of subsegmental L renal artery  - Stable in size based on CT this admission     L Renal Artery Stenosis. - S/P L renal artery angioplasty / stent placement  - BP satisfactory. - Follow for late recurrence of HTN (post-perinephric bleed) (I.e, Page kidney).     Anemia - blood loss.   - Hgb stabilized in 8-9 range now  - S/P transfusion 2 units

## 2020-08-31 LAB
ALBUMIN SERPL-MCNC: 4.2 G/DL (ref 3.4–5)
ANION GAP SERPL CALCULATED.3IONS-SCNC: 18 MMOL/L (ref 3–16)
BASOPHILS ABSOLUTE: 0 K/UL (ref 0–0.2)
BASOPHILS RELATIVE PERCENT: 0.8 %
BUN BLDV-MCNC: 41 MG/DL (ref 7–20)
CALCIUM SERPL-MCNC: 9.7 MG/DL (ref 8.3–10.6)
CHLORIDE BLD-SCNC: 86 MMOL/L (ref 99–110)
CO2: 26 MMOL/L (ref 21–32)
CREAT SERPL-MCNC: 2.1 MG/DL (ref 0.6–1.1)
EOSINOPHILS ABSOLUTE: 0.1 K/UL (ref 0–0.6)
EOSINOPHILS RELATIVE PERCENT: 2.2 %
GFR AFRICAN AMERICAN: 29
GFR NON-AFRICAN AMERICAN: 24
GLUCOSE BLD-MCNC: 108 MG/DL (ref 70–99)
GLUCOSE BLD-MCNC: 110 MG/DL (ref 70–99)
GLUCOSE BLD-MCNC: 115 MG/DL (ref 70–99)
GLUCOSE BLD-MCNC: 125 MG/DL (ref 70–99)
GLUCOSE BLD-MCNC: 138 MG/DL (ref 70–99)
HCT VFR BLD CALC: 33.3 % (ref 36–48)
HEMOGLOBIN: 10.6 G/DL (ref 12–16)
LYMPHOCYTES ABSOLUTE: 0.7 K/UL (ref 1–5.1)
LYMPHOCYTES RELATIVE PERCENT: 11.4 %
MCH RBC QN AUTO: 26.9 PG (ref 26–34)
MCHC RBC AUTO-ENTMCNC: 31.9 G/DL (ref 31–36)
MCV RBC AUTO: 84.1 FL (ref 80–100)
MONOCYTES ABSOLUTE: 0.5 K/UL (ref 0–1.3)
MONOCYTES RELATIVE PERCENT: 8.8 %
NEUTROPHILS ABSOLUTE: 4.5 K/UL (ref 1.7–7.7)
NEUTROPHILS RELATIVE PERCENT: 76.8 %
PDW BLD-RTO: 25.5 % (ref 12.4–15.4)
PERFORMED ON: ABNORMAL
PHOSPHORUS: 4 MG/DL (ref 2.5–4.9)
PLATELET # BLD: 260 K/UL (ref 135–450)
PMV BLD AUTO: 8.4 FL (ref 5–10.5)
POTASSIUM SERPL-SCNC: 3.8 MMOL/L (ref 3.5–5.1)
RBC # BLD: 3.96 M/UL (ref 4–5.2)
SODIUM BLD-SCNC: 130 MMOL/L (ref 136–145)
WBC # BLD: 5.9 K/UL (ref 4–11)

## 2020-08-31 PROCEDURE — 80069 RENAL FUNCTION PANEL: CPT

## 2020-08-31 PROCEDURE — 6360000002 HC RX W HCPCS: Performed by: NURSE PRACTITIONER

## 2020-08-31 PROCEDURE — 6370000000 HC RX 637 (ALT 250 FOR IP): Performed by: INTERNAL MEDICINE

## 2020-08-31 PROCEDURE — 85025 COMPLETE CBC W/AUTO DIFF WBC: CPT

## 2020-08-31 PROCEDURE — 99233 SBSQ HOSP IP/OBS HIGH 50: CPT | Performed by: NURSE PRACTITIONER

## 2020-08-31 PROCEDURE — 36415 COLL VENOUS BLD VENIPUNCTURE: CPT

## 2020-08-31 PROCEDURE — 2060000000 HC ICU INTERMEDIATE R&B

## 2020-08-31 PROCEDURE — 6370000000 HC RX 637 (ALT 250 FOR IP): Performed by: NURSE PRACTITIONER

## 2020-08-31 PROCEDURE — 94640 AIRWAY INHALATION TREATMENT: CPT

## 2020-08-31 RX ADMIN — GUAIFENESIN 600 MG: 600 TABLET, EXTENDED RELEASE ORAL at 09:12

## 2020-08-31 RX ADMIN — MAGNESIUM OXIDE TAB 400 MG (241.3 MG ELEMENTAL MG) 400 MG: 400 (241.3 MG) TAB at 09:11

## 2020-08-31 RX ADMIN — GUAIFENESIN 600 MG: 600 TABLET, EXTENDED RELEASE ORAL at 21:01

## 2020-08-31 RX ADMIN — FERROUS SULFATE TAB 325 MG (65 MG ELEMENTAL FE) 325 MG: 325 (65 FE) TAB at 09:10

## 2020-08-31 RX ADMIN — CLOPIDOGREL BISULFATE 75 MG: 75 TABLET ORAL at 09:11

## 2020-08-31 RX ADMIN — ACETAMINOPHEN 650 MG: 325 TABLET ORAL at 05:05

## 2020-08-31 RX ADMIN — POTASSIUM CHLORIDE 40 MEQ: 20 TABLET, EXTENDED RELEASE ORAL at 15:22

## 2020-08-31 RX ADMIN — TORSEMIDE 50 MG: 100 TABLET ORAL at 17:36

## 2020-08-31 RX ADMIN — MIDODRINE HYDROCHLORIDE 5 MG: 5 TABLET ORAL at 09:10

## 2020-08-31 RX ADMIN — POTASSIUM CHLORIDE 40 MEQ: 20 TABLET, EXTENDED RELEASE ORAL at 21:01

## 2020-08-31 RX ADMIN — ATORVASTATIN CALCIUM 80 MG: 80 TABLET, FILM COATED ORAL at 21:00

## 2020-08-31 RX ADMIN — LAMOTRIGINE 200 MG: 100 TABLET ORAL at 09:10

## 2020-08-31 RX ADMIN — ARIPIPRAZOLE 10 MG: 10 TABLET ORAL at 09:10

## 2020-08-31 RX ADMIN — FERROUS SULFATE TAB 325 MG (65 MG ELEMENTAL FE) 325 MG: 325 (65 FE) TAB at 17:36

## 2020-08-31 RX ADMIN — ASPIRIN 81 MG: 81 TABLET, COATED ORAL at 09:11

## 2020-08-31 RX ADMIN — TORSEMIDE 50 MG: 100 TABLET ORAL at 09:15

## 2020-08-31 RX ADMIN — MORPHINE SULFATE 2 MG: 2 INJECTION, SOLUTION INTRAMUSCULAR; INTRAVENOUS at 18:44

## 2020-08-31 RX ADMIN — BUSPIRONE HYDROCHLORIDE 30 MG: 5 TABLET ORAL at 09:10

## 2020-08-31 RX ADMIN — PANTOPRAZOLE SODIUM 40 MG: 40 TABLET, DELAYED RELEASE ORAL at 09:11

## 2020-08-31 RX ADMIN — INSULIN GLARGINE 18 UNITS: 100 INJECTION, SOLUTION SUBCUTANEOUS at 22:30

## 2020-08-31 RX ADMIN — BUSPIRONE HYDROCHLORIDE 30 MG: 5 TABLET ORAL at 21:01

## 2020-08-31 RX ADMIN — TRAMADOL HYDROCHLORIDE 50 MG: 50 TABLET, FILM COATED ORAL at 21:06

## 2020-08-31 RX ADMIN — SPIRONOLACTONE 50 MG: 25 TABLET ORAL at 09:10

## 2020-08-31 RX ADMIN — TRAZODONE HYDROCHLORIDE 100 MG: 50 TABLET ORAL at 21:06

## 2020-08-31 RX ADMIN — MIDODRINE HYDROCHLORIDE 5 MG: 5 TABLET ORAL at 17:37

## 2020-08-31 RX ADMIN — PANTOPRAZOLE SODIUM 40 MG: 40 TABLET, DELAYED RELEASE ORAL at 15:22

## 2020-08-31 RX ADMIN — MORPHINE SULFATE 2 MG: 2 INJECTION, SOLUTION INTRAMUSCULAR; INTRAVENOUS at 09:09

## 2020-08-31 RX ADMIN — BENZTROPINE MESYLATE 1 MG: 1 TABLET ORAL at 21:01

## 2020-08-31 RX ADMIN — POTASSIUM CHLORIDE 40 MEQ: 20 TABLET, EXTENDED RELEASE ORAL at 09:11

## 2020-08-31 RX ADMIN — LAMOTRIGINE 200 MG: 100 TABLET ORAL at 21:00

## 2020-08-31 RX ADMIN — TIOTROPIUM BROMIDE INHALATION SPRAY 2 PUFF: 3.12 SPRAY, METERED RESPIRATORY (INHALATION) at 09:09

## 2020-08-31 ASSESSMENT — PAIN SCALES - GENERAL
PAINLEVEL_OUTOF10: 9
PAINLEVEL_OUTOF10: 8
PAINLEVEL_OUTOF10: 8
PAINLEVEL_OUTOF10: 0

## 2020-08-31 ASSESSMENT — PAIN DESCRIPTION - PROGRESSION
CLINICAL_PROGRESSION: NOT CHANGED
CLINICAL_PROGRESSION: NOT CHANGED

## 2020-08-31 NOTE — CARE COORDINATION
JUANI spoke with pt on this day to follow up on potential needs. She notes that she is open to [facility] now. JUANI notes that a referral has already been made and accepted to [facility]. Juani spoke with Amauri Reyes who confirmed they will accept.

## 2020-08-31 NOTE — PROGRESS NOTES
Hospitalist Progress Note      PCP: Agatha Carlos PA-C    Date of Admission: 8/18/2020    Chief Complaint: RUQ pain    Hospital Course: The patient is a pleasant, chronically ill-appearing 62 Y F with a h/o COPD, HTN, HLD, DM2, CAD s/p stents and CABG, ischemic cardiomyopathy, and CHF.  She has extensive vascular disease and is s/p a LUE bypass, a LLE angioplasty (then again when it reoccluded), and a RLE bypass.  She continues to smoke.  She was recently diagnosed with L renal artery stenosis and underwent a L renal artery angioplasty and stent here on 8/4.  She was discharged home that day but returned to the ED a few hours later with L flank pain and was admitted with a L perinephric subcapsular hematoma.  She required 2u pRBCs and a coil embolization of a L renal artery branch on 8/5. Nettie Blandon hospitalization was complicated by SHAUNNA and hyponatremia, and with the help of nephrology each of these issues improved a little by the time she was discharged on 8/12.               Now the patient presents with new sharp RUQ pain which radiates to her R posterior flank, R chest, and R shoulder.  It started around 8pm last night, and she denies having any pain in this area previously.  It hurts her badly to breath or move.  She is very tender in the RUQ.  She is s/p cholecystectomy.  Her LFTs have been newly abnormal for the last couple weeks. Subjective:   Patient is up in bed, comfortable, not in distress. Denies any chest pain or shortness of breath. No new event overnight noted.         Medications:  Reviewed    Infusion Medications    DOBUTamine 1.25 mcg/kg/min (08/30/20 0749)    dextrose       Scheduled Medications    guaiFENesin  600 mg Oral BID    polyethylene glycol  17 g Oral Daily    potassium chloride  40 mEq Oral TID    torsemide  50 mg Oral BID    sodium chloride flush  10 mL Intravenous 2 times per day    docusate sodium  100 mg Oral Daily    ARIPiprazole  10 mg Oral Daily    aspirin EC 81 mg Oral Daily    benztropine  1 mg Oral Nightly    buprenorphine-naloxone  1 Film Sublingual BID    clopidogrel  75 mg Oral Daily    ferrous sulfate  325 mg Oral BID     lamoTRIgine  200 mg Oral BID    magnesium oxide  400 mg Oral Daily    midodrine  5 mg Oral BID     pantoprazole  40 mg Oral BID    spironolactone  50 mg Oral Daily    tiotropium  2 puff Inhalation Daily    insulin lispro  0-12 Units Subcutaneous TID     insulin lispro  0-6 Units Subcutaneous Nightly    busPIRone  30 mg Oral BID    insulin glargine  18 Units Subcutaneous Nightly    atorvastatin  80 mg Oral Nightly    sodium chloride flush  10 mL Intravenous 2 times per day     PRN Meds: sodium chloride flush, acetaminophen, morphine, ondansetron, magnesium hydroxide, perflutren lipid microspheres, traMADol, traZODone, glucose, dextrose, glucagon (rDNA), dextrose, sodium chloride flush, acetaminophen **OR** acetaminophen, albuterol      Intake/Output Summary (Last 24 hours) at 8/31/2020 1148  Last data filed at 8/31/2020 0825  Gross per 24 hour   Intake 480 ml   Output 3525 ml   Net -3045 ml       Physical Exam Performed:    /68   Pulse 88   Temp 98.7 °F (37.1 °C) (Oral)   Resp 16   Ht 5' 4.5\" (1.638 m)   Wt 232 lb 3.2 oz (105.3 kg)   LMP 10/24/2012   SpO2 97%   BMI 39.24 kg/m²     General appearance: No apparent distress, appears stated age and cooperative. HEENT: Pupils equal, round, and reactive to light. Conjunctivae/corneas clear. Neck: Supple, with full range of motion. No jugular venous distention. Trachea midline. Respiratory:  Normal respiratory effort. Clear to auscultation, bilaterally without Rales/Wheezes/Rhonchi. Cardiovascular: Regular rate and rhythm with normal S1/S2 without murmurs, rubs or gallops. Abdomen: Soft, non-tender, non-distended with normal bowel sounds. Musculoskeletal: No clubbing, cyanosis bilaterally. Full range of motion without deformity. 1+ BLE edema.     Skin: Skin color, texture, turgor normal.  No rashes or lesions. Neurologic:  Neurovascularly intact without any focal sensory/motor deficits. Cranial nerves: II-XII intact, grossly non-focal.  Psychiatric: Alert and oriented, thought content appropriate, normal insight  Capillary Refill: Brisk,< 3 seconds   Peripheral Pulses: +2 palpable, equal bilaterally       Labs:   Recent Labs     08/31/20  0417   WBC 5.9   HGB 10.6*   HCT 33.3*        Recent Labs     08/29/20  0433 08/30/20  0425 08/31/20  0417   * 131* 130*   K 3.6 4.5 3.8   CL 84* 87* 86*   CO2 30 28 26   BUN 40* 42* 41*   CREATININE 2.1* 2.3* 2.1*   CALCIUM 9.8 10.0 9.7   PHOS 3.2 3.4 4.0       Urinalysis:      Lab Results   Component Value Date    NITRU Negative 08/18/2020    WBCUA 0-2 08/18/2020    BACTERIA Rare 08/18/2020    RBCUA 0-2 08/18/2020    BLOODU Negative 08/18/2020    SPECGRAV 1.010 08/18/2020    GLUCOSEU Negative 08/18/2020       Radiology:  VL DUP CAROTID BILATERAL   Final Result      US GALLBLADDER RUQ   Final Result   Status post cholecystectomy, with normal caliber common duct for the post   cholecystectomy state. Fatty liver. NM Cardiac Stress Test Nuclear Imaging   Final Result      NM LUNG SCAN PERFUSION ONLY   Final Result   Low probability for pulmonary embolism. Assessment/Plan:    Active Hospital Problems    Diagnosis    Stenosis of left subclavian artery (HCC) [I77.1]    CKD (chronic kidney disease) stage 4, GFR 15-29 ml/min (HCC) [N18.4]    Acute on chronic right-sided heart failure (HCC) [I50.813]    Right upper quadrant pain [R10.11]    Acute on chronic combined systolic and diastolic congestive heart failure (HCC) [I50.43]     RUQ pain, suspect due to congestive hepatopathy, due to acute on chronic systolic CHF  - she had been on spironolactone and PRN metolazone.  Then last admission she was discharged with spironolactone and daily torsemide.    - Lasix gtt stopped 8/27.   Now on torsemide 50 mg BID and spironolactone 50 mg daily. - GI consulted and following.    - RUQ US unremarkable. - has a history of alcoholism - sober since 2006 and on Suboxone. - limit narcotics if possible.     Acute on chronic combined systolic and diastolic heart failure with fluid overload, LVEF of 25%  - not on ACE inhibitor or ARB due to CKD. - has implanted CardioMems unit   - no ACE/ARB/ARNI due to unstable renal status.    - cardiology and nephrology consulted and following.  - daily weights, I&O.  - diuretics as above. Started on Dobutamine gtt 8/24, which she seems to be responding well to. CKD3  - baseline Cr is probably about 1.5   Monitor during diuresis.    - nephrology consulted and following.     Hyponatremia  - it looks like her baseline is in the high 120's, even when accounting for hyperglycemia.  After a week of aggressive management, including multiple doses of tolvaptan, she still was only discharged at 126 last time.    - nephrology input noted. - tolvaptan given 8/27. CAD  - aspirin, clopidogrel.  OK to resume high dose statin for now. Monitor for worsening LFTs. - stress test was abnormal, so will need LHC at some point during her stay, possibly on Thursday.       DM2  - adjusted insulin regimen while here.  Recent A1c 7.2.     Anemia - due to blood loss. - 14.7 cm L perinephric subcapsular hematoma was stable on repeat CT on this admission.   - continued on iron. Hypokalemia - on TID replacement. - follow BMP. Morbid obesity - body mass index is 42.72 kg/m². Complicating assessment and treatment. Placing patient at risk for multiple co-morbidities as well as early death and contributing to the patient's presentation. Counseled on weight loss    Severe left subclavian stenosis - found on cardiac cath. - vascular consulted and following.  - planning for subclavian stenting tomorrow a.m. DVT Prophylaxis: SCDs  Diet: DIET CARDIAC; No Added Salt (3-4 GM);  Daily Fluid Restriction: 2000 ml  Dietary Nutrition Supplements: Frozen Oral Supplement  Code Status: Full Code    PT/OT Eval Status: not indicated    Dispo - likely another here through next week for subclavian stenting    Shanice Cuenca MD

## 2020-08-31 NOTE — PROGRESS NOTES
Occupational Therapy      Upon OT arrival pt stating that her lunch is not agreeing with her stomach. Encouraged pt to maybe sit up in chair and drink more fluids. Pt states she just needs to rest at this time. Pt transition back to supine position from sitting EOB. Pt states she will participate in therapy later this afternoon, but just needs a little while for her stomach to settle. Will follow up at later time as schedule allows.      [address] [facility] OTR/L, KWESI

## 2020-08-31 NOTE — PROGRESS NOTES
Nephrology Progress Note   [email or web address]      This patient is a 62year old female whom we are following for SHAUNNA on CKD. Subjective: The patient was seen and examined. Continues to diurese on Dobutamine drip. Sodium is 130. Weight is down to 232 lbs. Cr slightly better today. Family History: No family at bedside  ROS: No fever or chills, + swelling. No SOB. Vitals:  /68   Pulse 88   Temp 98.7 °F (37.1 °C) (Oral)   Resp 16   Ht 5' 4.5\" (1.638 m)   Wt 232 lb 3.2 oz (105.3 kg)   LMP 10/24/2012   SpO2 97%   BMI 39.24 kg/m²   I/O last 3 completed shifts: In: 240 [P.O.:240]  Out: 4225 [Urine:4225]  I/O this shift:  In: 240 [P.O.:240]  Out: -     Physical Exam:  Physical Exam  Vitals signs reviewed. Constitutional:       General: She is not in acute distress. Appearance: Normal appearance. HENT:      Head: Normocephalic and atraumatic. Mouth/Throat:      Mouth: Mucous membranes are moist.   Eyes:      General: No scleral icterus. Conjunctiva/sclera: Conjunctivae normal.   Cardiovascular:      Rate and Rhythm: Normal rate. Heart sounds: No friction rub. Pulmonary:      Effort: Pulmonary effort is normal.      Comments: Diminished breath sounds bilateral  Abdominal:      Tenderness: There is no abdominal tenderness. Musculoskeletal:      Right lower leg: Edema present. Left lower leg: Edema present. Neurological:      Mental Status: She is alert.            Medications:   guaiFENesin  600 mg Oral BID    polyethylene glycol  17 g Oral Daily    potassium chloride  40 mEq Oral TID    torsemide  50 mg Oral BID    sodium chloride flush  10 mL Intravenous 2 times per day    docusate sodium  100 mg Oral Daily    ARIPiprazole  10 mg Oral Daily    aspirin EC  81 mg Oral Daily    benztropine  1 mg Oral Nightly    buprenorphine-naloxone  1 Film Sublingual BID    clopidogrel  75 mg Oral Daily    ferrous sulfate  325 mg Oral BID WC    lamoTRIgine  200 mg Oral BID    magnesium oxide  400 mg Oral Daily    midodrine  5 mg Oral BID WC    pantoprazole  40 mg Oral BID    spironolactone  50 mg Oral Daily    tiotropium  2 puff Inhalation Daily    insulin lispro  0-12 Units Subcutaneous TID WC    insulin lispro  0-6 Units Subcutaneous Nightly    busPIRone  30 mg Oral BID    insulin glargine  18 Units Subcutaneous Nightly    atorvastatin  80 mg Oral Nightly    sodium chloride flush  10 mL Intravenous 2 times per day         Labs:  Recent Labs     08/31/20  0417   WBC 5.9   HGB 10.6*   HCT 33.3*   MCV 84.1        Recent Labs     08/29/20  0433 08/30/20  0425 08/31/20  0417   * 131* 130*   K 3.6 4.5 3.8   CL 84* 87* 86*   CO2 30 28 26   GLUCOSE 87 102* 108*   PHOS 3.2 3.4 4.0   MG  --  2.80*  --    BUN 40* 42* 41*   CREATININE 2.1* 2.3* 2.1*   LABGLOM 24* 22* 24*   GFRAA 29* 26* 29*           Assessment/Plan:    SHAUNNA/CKD stage 3.  - Etiology: ATN (hypotension; contrast dye) from last admission  - Data: SCr peaked at 2.0, improved to 1.2mg/dL but then worsened again. Weight is down, Cr slightly better today. - Inotrope added on 8/24/20  -  Midodrine for BP support      CHF - acute on chronic  - On Dobutamine drip. - 2 liter/day fluid restriction  - Strict I's/O's, daily weights   - CardioMEMS monitoring per Cardiology  - On Torsemide, will are awaiting cardiology opinion on pressure readings and weight to see if we need additional diuresis      Recent Perinephric hematoma. - Complication of L renal artery angioplasty / stent placement  - S/P coil embolization of subsegmental L renal artery  - Stable in size based on CT this admission     L Renal Artery Stenosis. - S/P L renal artery angioplasty / stent placement  - BP appropriate. - Follow for late recurrence of HTN (post-perinephric bleed) (I.e, Page kidney).     Anemia - blood loss. - Hgb stabilized and up to 10's now.   - S/P transfusion 2 units PRBC's last admission, on oral Fe.     Hyponatremia  - Has been present consistently since 4/20   - Related to volume excess / CHF       - s/p tolvaptan with little change      Hypokalemia.  - Stable on K replacement to 40 meq TID      Please do not hesitate to contact me at [phone] if with questions. Thank you!     Pierce Christian MD  8/31/2020  [facility]

## 2020-08-31 NOTE — PLAN OF CARE
Patient's EF (Ejection Fraction) is less than 40%    Heart Failure Medications:  Diuretics[de-identified] Torsemide    (One of the following REQUIRED for EF <40%/SYSTOLIC FAILURE but MAY be used in EF% >40%/DIASTOLIC FAILURE)        ACE[de-identified] None        ARB[de-identified] None         ARNI[de-identified] None    (Beta Blockers)  NON- Evidenced Based Beta Blocker (for EF% >40%/DIASTOLIC FAILURE): None    Evidenced Based Beta Blocker::(REQUIRED for EF% <40%/SYSTOLIC FAILURE) None  . .................................................................................................................................................. Patient's weights and intake/output reviewed: No    Patient's Last Weight: 232.3 lbs obtained by standing scale. Difference of 1.14 lbs less than last documented weight. Intake/Output Summary (Last 24 hours) at 8/31/2020 1430  Last data filed at 8/31/2020 1409  Gross per 24 hour   Intake 720 ml   Output 4025 ml   Net -3305 ml       Comorbidities Reviewed No    Patient has a past medical history of Acute kidney injury ([address] Acute on chronic congestive heart failure ([address] Alcohol dependence ([address] Bipolar 1 disorder ([address] CAD (coronary artery disease), Cardiomyopathy ([address] Cellulitis, CHF (congestive heart failure) ([address] COPD (chronic obstructive pulmonary disease) ([address] Diabetic ulcer of left foot associated with type 2 diabetes mellitus ([address] Diabetic ulcer of toe of right foot associated with type 2 diabetes mellitus ([address] GERD (gastroesophageal reflux disease), High cholesterol, HTN (hypertension), Kidney disease, chronic, stage II (mild, EGFR 60+ ml/min), MI (myocardial infarction) ([facility] Positive FIT (fecal immunochemical test), Pulmonary nodule, and PVD (peripheral vascular disease) ([address]      >>For CHF and Comorbidity documentation on Education Time and Topics, please see Education Tab    Progressive Mobility Assessment:  What is this patient's Current Level of Mobility?: Ambulatory- with Assistance  How was this patient Mobilized today?: Edge of Bed                 With Whom? Nurse, PCA, PT, OT, and Self                 Level of Difficulty/Assistance: 1x Assist     Pt resting in bed at this time on room air. Pt denies shortness of breath. Pt with nonpitting lower extremity edema.      Patient and/or Family's stated Goal of Care this Admission: increase activity tolerance prior to discharge        :

## 2020-08-31 NOTE — PROGRESS NOTES
subclavian stenosis - 95-99%, per Dr. Roque, planning intervention 9/1/20  5. Hypotension - on midodrine, stable  6. HLD - on high dose statin  7. CKD3 - creatinine stable, improved on dobutamine  8. Hyponatremia - improved  9. Hypokalemia -improved  10. DM - stable      PLAN:  1. Will increase dobutamine back to 2.5 mcg/kg/min for 1-2 days (until after further procedures)  2. Continue current diuretics (torsemide, spironolactone)  3. Attempt BB prior to discharge, once off inotrope  4. No ACEi/ARB/ARNI or BB due to hypotension and CKD  5.  Daily weights, labs, CardioMEMS    Win Thompson, TRAY - CNP, 8/31/2020, 8:56 AM  [address]   [phone]                   Telemetry: SR  80-90's, rare PVC's  NYHA: IV    Physical Exam:  General:  Awake, alert, NAD  Skin:  Warm and dry  Neck:  JVP 9 cm  Chest:  Clear to auscultation   Cardiovascular:  RRR, normal K7J5, soft systolic murmur, no g/r  Abdomen:  Soft, still some firmness/ induration to bilateral flank area, + bowel sounds  Extremities:  1+ BLE edema      Medications:    guaiFENesin  600 mg Oral BID    polyethylene glycol  17 g Oral Daily    potassium chloride  40 mEq Oral TID    torsemide  50 mg Oral BID    sodium chloride flush  10 mL Intravenous 2 times per day    docusate sodium  100 mg Oral Daily    ARIPiprazole  10 mg Oral Daily    aspirin EC  81 mg Oral Daily    benztropine  1 mg Oral Nightly    buprenorphine-naloxone  1 Film Sublingual BID    clopidogrel  75 mg Oral Daily    ferrous sulfate  325 mg Oral BID WC    lamoTRIgine  200 mg Oral BID    magnesium oxide  400 mg Oral Daily    midodrine  5 mg Oral BID WC    pantoprazole  40 mg Oral BID    spironolactone  50 mg Oral Daily    tiotropium  2 puff Inhalation Daily    insulin lispro  0-12 Units Subcutaneous TID     insulin lispro  0-6 Units Subcutaneous Nightly    busPIRone  30 mg Oral BID    insulin glargine  18 Units Subcutaneous Nightly    atorvastatin  80 mg Oral Nightly    sodium chloride flush  10 mL Intravenous 2 times per day      DOBUTamine 1.25 mcg/kg/min (08/30/20 4111)    dextrose         Lab Data: Lab results independently reviewed by myself 8/20/20   CBC:   Recent Labs     08/31/20  0417   WBC 5.9   HGB 10.6*        BMP:    Recent Labs     08/29/20  0433 08/30/20  0425 08/31/20  0417   * 131* 130*   K 3.6 4.5 3.8   CO2 30 28 26   BUN 40* 42* 41*   CREATININE 2.1* 2.3* 2.1*     INR:    No results for input(s): INR in the last 72 hours. BNP:    No results for input(s): PROBNP in the last 72 hours. Cardiac Enzymes:   No results for input(s): TROPONINI in the last 72 hours. Lipids:   Lab Results   Component Value Date    TRIG 78 01/27/2020    TRIG 140 01/11/2020    HDL 21 01/27/2020    HDL 27 01/11/2020    LDLCALC 21 01/27/2020    LDLCALC 103 01/11/2020    LDLDIRECT 187 08/01/2017       Cardiac Imaging:   CARDIAC CATH 8/27/20:   FINDINGS      HEMODYNAMICS   CHAMBER PRESSURE SATURATION   RA  15  54 %   RV  62/16     PA  69/20  51 %   PW  19 with V waves to 25     AORTA  118/63  94 %      HERMILA CARDIAC OUTPUT  5.4 L/min   SVR  1075 L/min   PVR  343 L/min      Left subclavian angiogram shows proximal 95 to 99% stenosis. There appears to be retrograde filling of the left subclavian artery by way of the LIMA and there appears to be coronary steal.  The vertebral arteries not well visualized.     Left heart catheterization   LVEDP  22   GRADIENT ACROSS AORTIC VALVE  none      CORONARY ARTERIES   LM  Proximal less than 10% stenosis, mid 10 to 20% stenosis, distal 70% stenosis.     Overall similar appearance to prior angiograms         LAD  Ostial 70% stenosis, the LAD is extensively stented in the proximal and mid segments, the stents appear to be widely patent with 20% in-stent restenosis, distal vessel has less than 10% stenosis.   There is retrograde filling into the LIMA to LAD graft that fills the left subclavian artery.     D1 is a small vessel with ostial 90% stenosis.     Overall similar appearance to prior angiograms         LCX  Circumflex is stented throughout the proximal and mid and distal segments into OM 1 which is a large vessel. Stents are widely patent with 10 to 20% in-stent restenosis. The stents jailed the AV groove circumflex and at the mid AV groove circumflex, there is a 70 to 80% stenosis.           RCA Dominant, medium vessel, heavily calcified, proximal-mid 60 to 70% stenosis, distal less than 10% stenosis, overall similar angiographically as compared to prior angiograms.      BYPASS GRAFTS   LIMA-LAD  Visualized nonselectively through retrograde filling from the native LAD, it is not visualized with antegrade flow from the left subclavian artery injection it appears to be widely patent with less than 10% [id number] stenosis.      CONCLUSIONS:      Elevated filling pressures, continue diuresis  CardioMEMS had good correlation with the right heart catheterization numbers. Severe pulmonary hypertension  Patent LIMA to LAD graft however there appears to be coronary steal phenomenon due to severe left subclavian stenosis  We will consider left subclavian intervention  Will obtain follow-up vascular ultrasound to assess this  We will consult with CT surgery regarding therapeutic options         STRESS MPI 8/22/20:    Summary    Calculated LVEF is not reliable on study which is technically difficult, estimate of 51% is likely an overestimate     Mid-distal anteroseptal/apical akinesis    Large fixed defects in anteroseptal/apical walls    Mild to moderate reversibility in lateral walls consistent with ischemia    Overall, this would be considered an abnormal high risk study       LIMITED ECHO 8/19/20:    Summary   Limited only f/u for LVEF and mitral regurgitation. Technically difficult examination. The left ventricular systolic function is severely reduced with an ejection fraction of 25%.    There is hypokinesis of the apex, apical lateral, apical septum, anterioseptum and anterior walls. Compared to previous study from 7-1-2020 no changes noted in left ventricular function. Moderate mitral regurgitation. LIMITED ECHO 6/29/20:    Summary   Technically difficult examination. Limited only f/u for LVEF. and mitral regurgitation. The left ventricular systolic function is severely reduced with an ejection fraction of 25 %. There is hypokinesis of the apex, apical lateral, apical septum and anteroseptum walls. Moderate mitral regurgitation. Surgery: 1/27/20 Urgent coronary artery bypass grafting surgery x1 with pedicled left internal mammary artery to the LAD. Cardiopulmonary bypass with on-pump beating-heart technique, no cross-clamp. Transesophageal echo. Epiaortic ultrasound. [place] catheter placement. Doppler verification of grafts. Bilateral five-level intercostal nerve block with Exparel. Platelet gel application. Sternal plating. Vas-Cath placement. URI 1/24/20:    Summary   Ejection fraction is visually estimated at 35-40%. Moderate to severe mitral regurgitation with multiple jets visualized. ERO estimated at 0.29 cm2. Mild tricuspid regurgitation. The left atrial appendage shows evidence of thrombus formation and low flow velocities.    Moderate amount of plaque in the aorta      CARDIAC CATH 1/20/2020:   Left Heart Cath  Dominance : Right   LM: 70-80% short distal stenosis   LAD: 70-80% short ostial stenosis, extending into takeoff of high first diagonal; large proximal to mid stented segment patent with jailed second diagonal  (80% ostial D2) and 70% in stent restenosis of most distal stent; distal to near apical LAD with minimal disease   LCx: 70-80% short ostial stenosis, prior to patent proximal to mid stents    RCA: large, dominant vessel with long 60% proximal to mid stenosis    LVEDP: 4 mmHG  LVG not done due to CKD.     Impression/Recommendations:  Diabetic with previous LAD and LCx stenting in 2018 returns with unstable angina, in stent restenosis and Left Main bifurcation disease. Recommend CTS evaluation inpatient to discuss surgical revascularization option. Hold Clopidogrel. ECHO 4/3/19:   [place]  systolic function is mildly reduced with EF estimated at 40-45%.   There is severe HK of the apex and apical-septal segment.   Normal left ventricular diastolic filling pressure.   Mild mitral regurgitation.   Systolic pulmonary artery pressure (SPAP) estimated at 32mmHg (RA pressure 3mmHg). [facility] 3/26/18 [employer] promus premier [id number] CCx and [id number] CCx.

## 2020-08-31 NOTE — PROGRESS NOTES
Comprehensive Nutrition Assessment    Type and Reason for Visit:  Reassess    Nutrition Recommendations/Plan:   1. Cardiac diet, No added Salt, FR 2000 ml/day  2. Start Magic cup with lunch and dinner due to drop in po intake and has increased protein needs  3. Will monitor nutritional adequacy, nutrition-related labs, weights, BMs, and clinical progress     Nutrition Assessment:  Follow up: Po intake has decreased to 25% meals. Continues on Cardiac diet with fluid restriction 2000 ml/day. Patient to have L subclavian stenting tomorrow am per vascular. Will trial Magic cup with meals to enhance protein due to drop in po intake (did not tolerate liquid nutrition supplements earlier this admission). Malnutrition Assessment:  Malnutrition Status: At risk for malnutrition (Comment)           Estimated Daily Nutrient Needs:  Energy (kcal):  6917-0931 kcal; Weight Used for Energy Requirements:  Ideal(56 kg)     Protein (g):  56-68 g; Weight Used for Protein Requirements:  Ideal(1.0-1.2 g/kg)        Fluid (ml/day):  1 ml/kcal; Weight Used for Fluid Requirements:  [place]      Nutrition Related Findings:  BM x 2 on 8/29      Wounds:  Pressure Ulcer, Stage II, Diabetic Ulcer       Current Nutrition Therapies:    DIET CARDIAC; No Added Salt (3-4 GM);  Daily Fluid Restriction: 2000 ml  Dietary Nutrition Supplements: Frozen Oral Supplement    Anthropometric Measures:  · Height: 5' 4.5\" (163.8 cm)  · Current Body Weight: 232 lb 3 oz (105.3 kg)   · Admission Body Weight: 251 lb 11 oz (114.2 kg)    · Usual Body Weight: (235-237 lb per pt)     · Ideal Body Weight: 123 lbs; % Ideal Body Weight 204.9 %   · BMI: 39.3  · Adjusted Body Weight:  ; No Adjustment   ·  BMI Categories: Obese Class 3 (BMI 40.0 or greater)       Nutrition Diagnosis:   · Increased nutrient needs related to increase demand for energy/nutrients as evidenced by wounds    Nutrition Interventions:   Food and/or Nutrient Delivery:  Continue Current Diet,

## 2020-09-01 ENCOUNTER — APPOINTMENT (OUTPATIENT)
Dept: CARDIAC CATH/INVASIVE PROCEDURES | Age: 55
DRG: 182 | End: 2020-09-01
Attending: INTERNAL MEDICINE
Payer: COMMERCIAL

## 2020-09-01 LAB
ALBUMIN SERPL-MCNC: 3.9 G/DL (ref 3.4–5)
ANION GAP SERPL CALCULATED.3IONS-SCNC: 16 MMOL/L (ref 3–16)
BUN BLDV-MCNC: 41 MG/DL (ref 7–20)
CALCIUM SERPL-MCNC: 9.5 MG/DL (ref 8.3–10.6)
CHLORIDE BLD-SCNC: 88 MMOL/L (ref 99–110)
CO2: 26 MMOL/L (ref 21–32)
CREAT SERPL-MCNC: 2.1 MG/DL (ref 0.6–1.1)
GFR AFRICAN AMERICAN: 29
GFR NON-AFRICAN AMERICAN: 24
GLUCOSE BLD-MCNC: 115 MG/DL (ref 70–99)
GLUCOSE BLD-MCNC: 116 MG/DL (ref 70–99)
GLUCOSE BLD-MCNC: 118 MG/DL (ref 70–99)
GLUCOSE BLD-MCNC: 122 MG/DL (ref 70–99)
GLUCOSE BLD-MCNC: 204 MG/DL (ref 70–99)
PERFORMED ON: ABNORMAL
PHOSPHORUS: 3.1 MG/DL (ref 2.5–4.9)
POTASSIUM SERPL-SCNC: 4 MMOL/L (ref 3.5–5.1)
SODIUM BLD-SCNC: 130 MMOL/L (ref 136–145)

## 2020-09-01 PROCEDURE — 36215 PLACE CATHETER IN ARTERY: CPT | Performed by: SURGERY

## 2020-09-01 PROCEDURE — 6370000000 HC RX 637 (ALT 250 FOR IP): Performed by: INTERNAL MEDICINE

## 2020-09-01 PROCEDURE — 36415 COLL VENOUS BLD VENIPUNCTURE: CPT

## 2020-09-01 PROCEDURE — 6360000002 HC RX W HCPCS: Performed by: NURSE PRACTITIONER

## 2020-09-01 PROCEDURE — 75710 ARTERY X-RAYS ARM/LEG: CPT | Performed by: SURGERY

## 2020-09-01 PROCEDURE — 76937 US GUIDE VASCULAR ACCESS: CPT | Performed by: SURGERY

## 2020-09-01 PROCEDURE — B31J1ZZ FLUOROSCOPY OF LEFT UPPER EXTREMITY ARTERIES USING LOW OSMOLAR CONTRAST: ICD-10-PCS | Performed by: SURGERY

## 2020-09-01 PROCEDURE — 99152 MOD SED SAME PHYS/QHP 5/>YRS: CPT | Performed by: SURGERY

## 2020-09-01 PROCEDURE — C1725 CATH, TRANSLUMIN NON-LASER: HCPCS

## 2020-09-01 PROCEDURE — 99153 MOD SED SAME PHYS/QHP EA: CPT

## 2020-09-01 PROCEDURE — 2060000000 HC ICU INTERMEDIATE R&B

## 2020-09-01 PROCEDURE — 75710 ARTERY X-RAYS ARM/LEG: CPT

## 2020-09-01 PROCEDURE — 2709999900 HC NON-CHARGEABLE SUPPLY

## 2020-09-01 PROCEDURE — 2500000003 HC RX 250 WO HCPCS

## 2020-09-01 PROCEDURE — 99233 SBSQ HOSP IP/OBS HIGH 50: CPT | Performed by: NURSE PRACTITIONER

## 2020-09-01 PROCEDURE — 99152 MOD SED SAME PHYS/QHP 5/>YRS: CPT

## 2020-09-01 PROCEDURE — B3121ZZ FLUOROSCOPY OF LEFT SUBCLAVIAN ARTERY USING LOW OSMOLAR CONTRAST: ICD-10-PCS | Performed by: SURGERY

## 2020-09-01 PROCEDURE — C1876 STENT, NON-COA/NON-COV W/DEL: HCPCS

## 2020-09-01 PROCEDURE — 6360000002 HC RX W HCPCS

## 2020-09-01 PROCEDURE — 80069 RENAL FUNCTION PANEL: CPT

## 2020-09-01 PROCEDURE — 94640 AIRWAY INHALATION TREATMENT: CPT

## 2020-09-01 PROCEDURE — C1760 CLOSURE DEV, VASC: HCPCS

## 2020-09-01 PROCEDURE — 6360000004 HC RX CONTRAST MEDICATION

## 2020-09-01 PROCEDURE — 6370000000 HC RX 637 (ALT 250 FOR IP): Performed by: NURSE PRACTITIONER

## 2020-09-01 PROCEDURE — 36215 PLACE CATHETER IN ARTERY: CPT

## 2020-09-01 PROCEDURE — 37236 OPEN/PERQ PLACE STENT 1ST: CPT | Performed by: SURGERY

## 2020-09-01 PROCEDURE — 37236 OPEN/PERQ PLACE STENT 1ST: CPT

## 2020-09-01 PROCEDURE — C1769 GUIDE WIRE: HCPCS

## 2020-09-01 PROCEDURE — 03743DZ DILATION OF LEFT SUBCLAVIAN ARTERY WITH INTRALUMINAL DEVICE, PERCUTANEOUS APPROACH: ICD-10-PCS | Performed by: SURGERY

## 2020-09-01 PROCEDURE — C1894 INTRO/SHEATH, NON-LASER: HCPCS

## 2020-09-01 RX ORDER — MIDAZOLAM HYDROCHLORIDE 5 MG/ML
INJECTION INTRAMUSCULAR; INTRAVENOUS
Status: COMPLETED | OUTPATIENT
Start: 2020-09-01 | End: 2020-09-01

## 2020-09-01 RX ORDER — 0.9 % SODIUM CHLORIDE 0.9 %
2 INTRAVENOUS SOLUTION INTRAVENOUS ONCE
Status: DISCONTINUED | OUTPATIENT
Start: 2020-09-01 | End: 2020-09-01

## 2020-09-01 RX ORDER — FENTANYL CITRATE 50 UG/ML
INJECTION, SOLUTION INTRAMUSCULAR; INTRAVENOUS
Status: COMPLETED | OUTPATIENT
Start: 2020-09-01 | End: 2020-09-01

## 2020-09-01 RX ORDER — 0.9 % SODIUM CHLORIDE 0.9 %
250 INTRAVENOUS SOLUTION INTRAVENOUS ONCE
Status: COMPLETED | OUTPATIENT
Start: 2020-09-01 | End: 2020-09-01

## 2020-09-01 RX ORDER — DOBUTAMINE HYDROCHLORIDE 200 MG/100ML
1.25 INJECTION INTRAVENOUS CONTINUOUS
Status: DISCONTINUED | OUTPATIENT
Start: 2020-09-01 | End: 2020-09-02

## 2020-09-01 RX ORDER — MIDODRINE HYDROCHLORIDE 5 MG/1
10 TABLET ORAL
Status: DISCONTINUED | OUTPATIENT
Start: 2020-09-01 | End: 2020-09-02 | Stop reason: HOSPADM

## 2020-09-01 RX ORDER — HEPARIN SODIUM 1000 [USP'U]/ML
INJECTION, SOLUTION INTRAVENOUS; SUBCUTANEOUS
Status: COMPLETED | OUTPATIENT
Start: 2020-09-01 | End: 2020-09-01

## 2020-09-01 RX ADMIN — ACETAMINOPHEN 650 MG: 325 TABLET ORAL at 18:32

## 2020-09-01 RX ADMIN — FERROUS SULFATE TAB 325 MG (65 MG ELEMENTAL FE) 325 MG: 325 (65 FE) TAB at 16:12

## 2020-09-01 RX ADMIN — TORSEMIDE 50 MG: 100 TABLET ORAL at 04:48

## 2020-09-01 RX ADMIN — HEPARIN SODIUM 5000 UNITS: 1000 INJECTION, SOLUTION INTRAVENOUS; SUBCUTANEOUS at 11:19

## 2020-09-01 RX ADMIN — TORSEMIDE 50 MG: 100 TABLET ORAL at 17:27

## 2020-09-01 RX ADMIN — ATORVASTATIN CALCIUM 80 MG: 80 TABLET, FILM COATED ORAL at 20:54

## 2020-09-01 RX ADMIN — DOBUTAMINE HYDROCHLORIDE 1.25 MCG/KG/MIN: 200 INJECTION INTRAVENOUS at 16:13

## 2020-09-01 RX ADMIN — Medication 250 ML: at 09:30

## 2020-09-01 RX ADMIN — POTASSIUM CHLORIDE 40 MEQ: 20 TABLET, EXTENDED RELEASE ORAL at 08:11

## 2020-09-01 RX ADMIN — BUSPIRONE HYDROCHLORIDE 30 MG: 5 TABLET ORAL at 20:54

## 2020-09-01 RX ADMIN — INSULIN LISPRO 4 UNITS: 100 INJECTION, SOLUTION INTRAVENOUS; SUBCUTANEOUS at 17:28

## 2020-09-01 RX ADMIN — SPIRONOLACTONE 50 MG: 25 TABLET ORAL at 08:11

## 2020-09-01 RX ADMIN — LAMOTRIGINE 200 MG: 100 TABLET ORAL at 08:11

## 2020-09-01 RX ADMIN — FENTANYL CITRATE 50 MCG: 50 INJECTION, SOLUTION INTRAMUSCULAR; INTRAVENOUS at 11:07

## 2020-09-01 RX ADMIN — MORPHINE SULFATE 2 MG: 2 INJECTION, SOLUTION INTRAMUSCULAR; INTRAVENOUS at 12:51

## 2020-09-01 RX ADMIN — GUAIFENESIN 600 MG: 600 TABLET, EXTENDED RELEASE ORAL at 20:56

## 2020-09-01 RX ADMIN — DOBUTAMINE HYDROCHLORIDE 2.5 MCG/KG/MIN: 200 INJECTION INTRAVENOUS at 04:48

## 2020-09-01 RX ADMIN — PANTOPRAZOLE SODIUM 40 MG: 40 TABLET, DELAYED RELEASE ORAL at 16:13

## 2020-09-01 RX ADMIN — ARIPIPRAZOLE 10 MG: 10 TABLET ORAL at 08:11

## 2020-09-01 RX ADMIN — INSULIN GLARGINE 18 UNITS: 100 INJECTION, SOLUTION SUBCUTANEOUS at 20:57

## 2020-09-01 RX ADMIN — MIDAZOLAM HYDROCHLORIDE 1 MG: 5 INJECTION INTRAMUSCULAR; INTRAVENOUS at 11:08

## 2020-09-01 RX ADMIN — CLOPIDOGREL BISULFATE 75 MG: 75 TABLET ORAL at 08:10

## 2020-09-01 RX ADMIN — BENZTROPINE MESYLATE 1 MG: 1 TABLET ORAL at 20:53

## 2020-09-01 RX ADMIN — TIOTROPIUM BROMIDE INHALATION SPRAY 2 PUFF: 3.12 SPRAY, METERED RESPIRATORY (INHALATION) at 07:31

## 2020-09-01 RX ADMIN — ASPIRIN 81 MG: 81 TABLET, COATED ORAL at 08:10

## 2020-09-01 RX ADMIN — BUSPIRONE HYDROCHLORIDE 30 MG: 5 TABLET ORAL at 08:10

## 2020-09-01 RX ADMIN — LAMOTRIGINE 200 MG: 100 TABLET ORAL at 20:53

## 2020-09-01 RX ADMIN — POTASSIUM CHLORIDE 40 MEQ: 20 TABLET, EXTENDED RELEASE ORAL at 16:12

## 2020-09-01 RX ADMIN — MAGNESIUM OXIDE TAB 400 MG (241.3 MG ELEMENTAL MG) 400 MG: 400 (241.3 MG) TAB at 08:11

## 2020-09-01 RX ADMIN — TRAZODONE HYDROCHLORIDE 100 MG: 50 TABLET ORAL at 22:38

## 2020-09-01 RX ADMIN — POTASSIUM CHLORIDE 40 MEQ: 20 TABLET, EXTENDED RELEASE ORAL at 20:54

## 2020-09-01 RX ADMIN — MIDODRINE HYDROCHLORIDE 5 MG: 5 TABLET ORAL at 08:11

## 2020-09-01 RX ADMIN — PANTOPRAZOLE SODIUM 40 MG: 40 TABLET, DELAYED RELEASE ORAL at 08:11

## 2020-09-01 RX ADMIN — MIDODRINE HYDROCHLORIDE 10 MG: 5 TABLET ORAL at 16:13

## 2020-09-01 RX ADMIN — GUAIFENESIN 600 MG: 600 TABLET, EXTENDED RELEASE ORAL at 08:12

## 2020-09-01 ASSESSMENT — PAIN SCALES - GENERAL
PAINLEVEL_OUTOF10: 0
PAINLEVEL_OUTOF10: 8
PAINLEVEL_OUTOF10: 9
PAINLEVEL_OUTOF10: 0

## 2020-09-01 NOTE — PROGRESS NOTES
[facility]   Daily Progress Note    Admit Date:  8/18/2020  HPI:   Presented with right sided abdominal pain radiating to right chest, associated with shortness of breath, weight gain and swelling. Troponin and BNP elevated. Hx of CAD s/p PCI to LAD and LCx 2018, CABG X1 (LIMA to LAD,Jan 2020 for restenosis of LAD and LCx and significant dz in RCA). Hx of ICM, sCHF s/p CardioMEMs implant (2/2019), MR, PAD, CKD, DM, COPD, leg wounds. She was recently admitted following left renal artery angioplasty and stenting complicated by perinephric hematoma requiring coil embolization of bleeding vessel. Stress test abnormal.  [facility] with left subclavian stenosis affecting flow to LIMA to LAD. Started on low dose dobutamine to aide in diuresis and renal perfusion. S/p L subclavian angio/ stenting 9/1/20 successful with normal antegrade flow to LIMA. Subjective:  Ms. Shabnam Figueroa sitting up on side of bed, feels much better today. Breathing is easier, abdomen less distended/ tight. Objective:   /80   Pulse 97   Temp 97.6 °F (36.4 °C) (Oral)   Resp 20   Ht 5' 4.5\" (1.638 m)   Wt 227 lb 4.8 oz (103.1 kg)   LMP 10/24/2012   SpO2 96%   BMI 38.41 kg/m²       Intake/Output Summary (Last 24 hours) at 9/1/2020 1549  Last data filed at 9/1/2020 1406  Gross per 24 hour   Intake 1136 ml   Output 4350 ml   Net -3214 ml     Wt Readings from Last 3 Encounters:   09/01/20 227 lb 4.8 oz (103.1 kg)   08/18/20 250 lb (113.4 kg)   08/12/20 255 lb 1.6 oz (115.7 kg)         ASSESSMENT:   1. CHF, acute on chronic combined and right heart failure - weight down to 227 lbs, likely at baseline fluid status  2. Cardiomyopathy, ischemic - EF 25% by limited echo, unchanged; not on ACEi/ARB/ARNI due to hypotension and CKD, on dobutamine 2.5 mcg/kg/min  3. CAD s/p PCI's and CABG - stress test with lateral reversible ischemia, + left subclavian steal to LIMA, on high dose statin, DAPT, no chest pain   4.  Left subclavian stenosis - 95-99%, per Dr. Mar Bruce, s/p angio/ stenting 9/1/20 successful  5. Hypotension - nephrology increased midodrine to 10 mg tid  6. HLD - on high dose statin  7. CKD3 - creatinine stable, improved on dobutamine  8. Hyponatremia - stable  9. Hypokalemia -improved  10. DM - stable      PLAN:  1. Decrease dobutamine to 1.25 mcg now then discontinue at 6 am tomorrow am  2. Continue current diuretics (torsemide 50 mg bid, spironolactone 50 mg qd)  3. Attempt BB prior to discharge, once off inotrope  4. No ACEi/ARB/ARNI or BB due to hypotension and CKD  5. PA pressures not checked today, will do in AM  6. Daily weights, labs  7. Discontinue roche catheter  8.  Anticipate discharge in 1-2 days    Tracey Glover, TRAY - CNP, 9/1/2020, 3:49 PM  \[facility] [place] [facility]\"" 81   [phone]      Telemetry: SR  80-90's, rare PVC's  NYHA: IV    Physical Exam:  General:  Awake, alert, NAD  Skin:  Warm and dry  Neck:  JVP 8-9 cm  Chest:  Clear to auscultation   Cardiovascular:  RRR, normal T5M9, soft systolic murmur, no g/r  Abdomen:  Soft, + bowel sounds, non-tender  Extremities:  1+ BLE edema, purple when dangling      Medications:    midodrine  10 mg Oral TID     guaiFENesin  600 mg Oral BID    polyethylene glycol  17 g Oral Daily    potassium chloride  40 mEq Oral TID    torsemide  50 mg Oral BID    sodium chloride flush  10 mL Intravenous 2 times per day    docusate sodium  100 mg Oral Daily    ARIPiprazole  10 mg Oral Daily    aspirin EC  81 mg Oral Daily    benztropine  1 mg Oral Nightly    buprenorphine-naloxone  1 Film Sublingual BID    clopidogrel  75 mg Oral Daily    ferrous sulfate  325 mg Oral BID WC    lamoTRIgine  200 mg Oral BID    magnesium oxide  400 mg Oral Daily    pantoprazole  40 mg Oral BID    spironolactone  50 mg Oral Daily    tiotropium  2 puff Inhalation Daily    insulin lispro  0-12 Units Subcutaneous TID     insulin lispro  0-6 Units Subcutaneous Nightly    busPIRone 30 mg Oral BID    insulin glargine  18 Units Subcutaneous Nightly    atorvastatin  80 mg Oral Nightly    sodium chloride flush  10 mL Intravenous 2 times per day      DOBUTamine 2.5 mcg/kg/min (09/01/20 0448)    dextrose         Lab Data: Lab results independently reviewed by myself 8/20/20   CBC:   Recent Labs     08/31/20 0417   WBC 5.9   HGB 10.6*        BMP:    Recent Labs     08/30/20  0425 08/31/20  0417 09/01/20  0515   * 130* 130*   K 4.5 3.8 4.0   CO2 28 26 26   BUN 42* 41* 41*   CREATININE 2.3* 2.1* 2.1*     INR:    No results for input(s): INR in the last 72 hours. BNP:    No results for input(s): PROBNP in the last 72 hours. Cardiac Enzymes:   No results for input(s): TROPONINI in the last 72 hours. Lipids:   Lab Results   Component Value Date    TRIG 78 01/27/2020    TRIG 140 01/11/2020    HDL 21 01/27/2020    HDL 27 01/11/2020    LDLCALC 21 01/27/2020    LDLCALC 103 01/11/2020    LDLDIRECT 187 08/01/2017       Cardiac Imaging:   CARDIAC CATH 8/27/20:   FINDINGS      HEMODYNAMICS   CHAMBER PRESSURE SATURATION   RA  15  54 %   RV  62/16     PA  69/20  51 %   PW  19 with V waves to 25     AORTA  118/63  94 %      HERMILA CARDIAC OUTPUT  5.4 L/min   SVR  1075 L/min   PVR  343 L/min      Left subclavian angiogram shows proximal 95 to 99% stenosis. There appears to be retrograde filling of the left subclavian artery by way of the LIMA and there appears to be coronary steal.  The vertebral arteries not well visualized.     Left heart catheterization   LVEDP  22   GRADIENT ACROSS AORTIC VALVE  none      CORONARY ARTERIES   LM  Proximal less than 10% stenosis, mid 10 to 20% stenosis, distal 70% stenosis.     Overall similar appearance to prior angiograms         LAD  Ostial 70% stenosis, the LAD is extensively stented in the proximal and mid segments, the stents appear to be widely patent with 20% in-stent restenosis, distal vessel has less than 10% stenosis.   There is retrograde filling into the CORNEJO to LAD graft that fills the left subclavian artery.     D1 is a small vessel with ostial 90% stenosis.     Overall similar appearance to prior angiograms         LCX  Circumflex is stented throughout the proximal and mid and distal segments into OM 1 which is a large vessel. Stents are widely patent with 10 to 20% in-stent restenosis. The stents jailed the AV groove circumflex and at the mid AV groove circumflex, there is a 70 to 80% stenosis.           RCA Dominant, medium vessel, heavily calcified, proximal-mid 60 to 70% stenosis, distal less than 10% stenosis, overall similar angiographically as compared to prior angiograms.      BYPASS GRAFTS   LIMA-LAD  Visualized nonselectively through retrograde filling from the native LAD, it is not visualized with antegrade flow from the left subclavian artery injection it appears to be widely patent with less than 10% [id number] stenosis.      CONCLUSIONS:      Elevated filling pressures, continue diuresis  CardioMEMS had good correlation with the right heart catheterization numbers. Severe pulmonary hypertension  Patent LIMA to LAD graft however there appears to be coronary steal phenomenon due to severe left subclavian stenosis  We will consider left subclavian intervention  Will obtain follow-up vascular ultrasound to assess this  We will consult with CT surgery regarding therapeutic options         STRESS MPI 8/22/20:    Summary    Calculated LVEF is not reliable on study which is technically difficult, estimate of 51% is likely an overestimate     Mid-distal anteroseptal/apical akinesis    Large fixed defects in anteroseptal/apical walls    Mild to moderate reversibility in lateral walls consistent with ischemia    Overall, this would be considered an abnormal high risk study       LIMITED ECHO 8/19/20:    Summary   Limited only f/u for LVEF and mitral regurgitation. Technically difficult examination.    The left ventricular systolic mmHG  LVG not done due to CKD.     Impression/Recommendations:  Diabetic with previous LAD and LCx stenting in 2018 returns with unstable angina, in stent restenosis and Left Main bifurcation disease. Recommend CTS evaluation inpatient to discuss surgical revascularization option. Hold Clopidogrel. ECHO 4/3/19:   Aquilino Medici systolic function is mildly reduced with EF estimated at 40-45%.   There is severe HK of the apex and apical-septal segment.   Normal left ventricular diastolic filling pressure.   Mild mitral regurgitation.   Systolic pulmonary artery pressure (SPAP) estimated at 32mmHg (RA pressure 3mmHg). [facility] 3/26/18 [employer] promus premier [email or web address] CCx and [id number] CCx.

## 2020-09-01 NOTE — ADT AUTH CERT
Utilization Reviews         Abdominal Pain, Undiagnosed - Care Day 15 (9/1/2020) by Major Wilkinson RN         Review Entered  Review Status    9/1/2020 12:33  Completed        Criteria Review       Care Day: 15 Care Date: 9/1/2020 Level of Care:    Guideline Day 2    Level Of Care    (X) Floor to discharge    9/1/2020 12:33 PM EDT by Chiara mcgraw    Clinical Status    (X) * Hemodynamic stability    9/1/2020 12:33 PM EDT by Chiara King      hr 95-97  bp: 119/76    ( ) * Pain absent or managed    (X) * Etiology or finding requiring inpatient treatment absent    (X) * Liquid or advanced diet tolerated    ( ) * Discharge plans and education understood    Activity    ( ) * Ambulatory or acceptable for next level of care [D]    Routes    ( ) * Oral hydration, medications, and diet    * Milestone    Additional Notes    9/1  Day 15       Pt remains on medsurg unit       Vitals: t: 36.4 p: 97 rr: 20 bp: 119/76 spo2: 97% ra       Abn labs:  na: 130, bun: 41, cr: 2.1       Orders:     Accu checks ac/hs with ssi coverage    Renal function panel daily    Vascular surgery consult    Accu checks ac/hs with ssi coverage    Dobutamine gtt continuous           Abdominal Pain, Undiagnosed - Care Day 14 (8/31/2020) by Major Wilkinson RN         Review Entered  Review Status    9/1/2020 12:33  Completed        Criteria Review       Care Day: 14 Care Date: 8/31/2020 Level of Care:    Guideline Day 2    Level Of Care    (X) Floor to discharge    9/1/2020 12:33 PM EDT by Chiara mcgraw    Clinical Status    (X) * Hemodynamic stability    ( ) * Pain absent or managed    (X) * Etiology or finding requiring inpatient treatment absent    (X) * Liquid or advanced diet tolerated    ( ) * Discharge plans and education understood    Activity    ( ) * Ambulatory or acceptable for next level of care [D]    Routes    ( ) * Oral hydration, medications, and diet    * Milestone    Additional Notes    8/31  Day 14       Pt remains on medsurg unit       Vitals:  t: 36.4 p: 86 rr: 16 bp: 120/77 spo2: 96% ra       Abn labs:  hgb: 10.6, na: 130,bun: 41, cr: 2.1       Orders: Accu checks ac/hs with ssi coverage    Renal function panel daily    Vascular surgery consult    Accu checks ac/hs with ssi coverage    Dobutamine gtt continuous       Per Cardiology PN:         ASSESSMENT:    1. CHF, acute on chronic combined and right heart failure - weight down, PAD down to 25, likely at baseline fluid status    2. Cardiomyopathy, ischemic - EF 25% by limited echo, unchanged; not on ACEi/ARB/ARNI due to hypotension and CKD, on dobutamine 1.25 mcg/kg/min    3. CAD s/p PCI's and CABG - stress test with lateral reversible ischemia, + left subclavian steal to LIMA, on high dose statin, DAPT, no chest pain    4. Left subclavian stenosis - 95-99%, per Dr. Sugar Duran, planning intervention 9/1/20    5. Hypotension - on midodrine, stable    6. HLD - on high dose statin    7. CKD3 - creatinine stable, improved on dobutamine    8. Hyponatremia - improved    9. Hypokalemia -improved    10. DM - stable              PLAN:    1. Will increase dobutamine back to 2.5 mcg/kg/min for 1-2 days (until after further procedures)    2. Continue current diuretics (torsemide, spironolactone)    3. Attempt BB prior to discharge, once off inotrope    4. No ACEi/ARB/ARNI or BB due to hypotension and CKD    5. Daily weights, labs, CardioMEMS       Per Nephrology PN:         Assessment/Plan:         SHAUNNA/CKD stage 3.    - Etiology: ATN (hypotension; contrast dye) from last admission    - Data: SCr peaked at 2.0, improved to 1.2mg/dL but then worsened again. Nicolle Pipe is down, Cr slightly better today. - Inotrope added on 8/24/20    -  Midodrine for BP support         CHF - acute on chronic    - On Dobutamine drip.     - 2 liter/day fluid restriction    - Strict I's/O's, daily weights    - CardioMEMS monitoring per Cardiology    - On Torsemide, will are awaiting cardiology opinion on pressure readings and weight to see if we need additional diuresis         Recent Perinephric hematoma. - Complication of L renal artery angioplasty / stent placement    - S/P coil embolization of subsegmental L renal artery    - Stable in size based on CT this admission         L Renal Artery Stenosis. - S/P L renal artery angioplasty / stent placement    - BP appropriate. - Follow for late recurrence of HTN (post-perinephric bleed) (I.e, Page kidney).         Anemia - blood loss. - Hgb stabilized and up to 10's now. - S/P transfusion 2 units PRBC's last admission, on oral Fe.         Hyponatremia    - Has been present consistently since 4/20    - Related to volume excess / CHF         - s/p tolvaptan with little change         Hypokalemia.    - Stable on K replacement to 40 meq TID       Per IM PN:    Assessment/Plan:         Active Hospital Problems      Diagnosis    · Stenosis of left subclavian artery (HCC) [I77.1]    · CKD (chronic kidney disease) stage 4, GFR 15-29 ml/min (HCC) [N18.4]    · Acute on chronic right-sided heart failure (HCC) [I50.813]    · Right upper quadrant pain [R10.11]    · Acute on chronic combined systolic and diastolic congestive heart failure (HCC) [I50.43]       RUQ pain, suspect due to congestive hepatopathy, due to acute on chronic systolic CHF    - she had been on spironolactone and PRN metolazone.  Then last admission she was discharged with spironolactone and daily torsemide.      - Lasix gtt stopped 8/27.  Now on torsemide 50 mg BID and spironolactone 50 mg daily. - GI consulted and following.      - RUQ US unremarkable.      - has a history of alcoholism - sober since 2006 and on Suboxone. - limit narcotics if possible.         Acute on chronic combined systolic and diastolic heart failure with fluid overload, LVEF of 25%    - not on ACE inhibitor or ARB due to CKD.     - has implanted CardioMems unit    - no ACE/ARB/ARNI due to unstable renal status.      - cardiology and nephrology consulted and following.    - daily weights, I&O.    - diuretics as above.  Started on Dobutamine gtt 8/24, which she seems to be responding well to.         CKD3    - baseline Cr is probably about 1.5   Monitor during diuresis.      - nephrology consulted and following.         Hyponatremia    - it looks like her baseline is in the high 120's, even when accounting for hyperglycemia.  After a week of aggressive management, including multiple doses of tolvaptan, she still was only discharged at 126 last time.      - nephrology input noted. - tolvaptan given 8/27.         CAD    - aspirin, clopidogrel.  OK to resume high dose statin for now.  Monitor for worsening LFTs. - stress test was abnormal, so will need LHC at some point during her stay, possibly on Thursday.           DM2    - adjusted insulin regimen while here.  Recent A1c 7.2.         Anemia - due to blood loss. - 14.7 cm L perinephric subcapsular hematoma was stable on repeat CT on this admission.    - continued on iron.           Hypokalemia - on TID replacement. - follow BMP.         Morbid obesity - body mass index is 12.64 kg/m². Complicating assessment and treatment. Placing patient at risk for multiple co-morbidities as well as early death and contributing to the patient's presentation. Counseled on weight loss         Severe left subclavian stenosis - found on cardiac cath.     - vascular consulted and following.    - planning for subclavian stenting tomorrow a.m.           Abdominal Pain, Undiagnosed - Care Day 13 (8/30/2020) by Lazaro Barnett RN         Review Entered  Review Status    9/1/2020 12:33  Completed        Criteria Review       Care Day: 13 Care Date: 8/30/2020 Level of Care:    Guideline Day 2    Level Of Care    (X) Floor to discharge    9/1/2020 12:33 PM EDT by Dewey cmgraw    Clinical Status    (X) * Hemodynamic stability    9/1/2020 12:33 PM EDT by AngelyMamie      hr 86-90  bp: 110/61    ( ) * Pain absent or managed    (X) * Etiology or finding requiring inpatient treatment absent    (X) * Liquid or advanced diet tolerated    ( ) * Discharge plans and education understood    Activity    ( ) * Ambulatory or acceptable for next level of care [D]    Routes    ( ) * Oral hydration, medications, and diet    * Milestone    Additional Notes      Day 13       Pt remains on mesurg unit       Vitals;  36.8 p: 89 rr: 18 bp: 122/78 spo2: 95% ra       Abn labs:  na: 131, gluc: 102, bun: 42, cr: 2.3, m.80       Orders: Accu checks ac/hs with ssi coverage    Renal function panel daily    Vascular surgery consult    Accu checks ac/hs with ssi coverage    Dobutamine gtt continuous       Per Nephrology PN:    Assessment/Plan:         SHAUNNA/CKD stage 3.    - Etiology: ATN (hypotension; contrast dye) from last admission    - Data: SCr peaked at 2.0, improved to 1.2mg/dL but now 2. 3. Roberta Smyth is down    - Inotrope added on 20    - Continue current diuretics, will not augment diuresis further with metolazone today.  Midodrine for BP support         CHF - acute on chronic    - On Dobutamine drip. - 2 liter/day fluid restriction    - Strict I's/O's, daily weights    - CardioMEMS monitoring per Cardiology    - On Torsemide, will awaiting cardiology opinion on pressure readings and weight to see if we need additional diuresis         Recent Perinephric hematoma. - Complication of L renal artery angioplasty / stent placement    - S/P coil embolization of subsegmental L renal artery    - Stable in size based on CT this admission         L Renal Artery Stenosis. - S/P L renal artery angioplasty / stent placement    - BP satisfactory. - Follow for late recurrence of HTN (post-perinephric bleed) (I.e, Page kidney).         Anemia - blood loss.     - Hgb stabilized in 8-9 range now    - S/P transfusion 2 units PRBC's last admission, on oral Fe.      Hyponatremia    - Has been present consistently since 4/20    - Related to volume excess / CHF         - s/p tolvaptan with little change         Hypokalemia.    - Stable on K replacement to 40 meq TID       Per Cardiology PN:    Assessment:      Acute on chronic combined systolic and diastolic congestive heart failure - improving w/ >20 liter diuresis. Intravascular volume status difficult to determine on exam.    Right upper quadrant pain - not cardiac    Acute on chronic right-sided heart failure    CAD - CABG/PCI in past. stable w/o angina    ICMP    PAD    CKD - relatively stable w/ diuresis    DM    COPD    CardioMEMs implant (2/2019)    Obesity    HLD    Hypotension - improved and more stable         Plan    Recheck CardioMEMS in am    Cont dobs and diuretics and reassess in am reducing diuresis    Attempting low dose BBlk as BP stable    No ACEi/ARB/ARNI due to hypotension and CKD. Reconsider in future    Cont dobutamine another 24 hours     Daily weights, daily BMP       Per IM PN:    Assessment/Plan:         Active Hospital Problems      Diagnosis    · Stenosis of left subclavian artery (HCC) [I77.1]    · CKD (chronic kidney disease) stage 4, GFR 15-29 ml/min (HCC) [N18.4]    · Acute on chronic right-sided heart failure (HCC) [I50.813]    · Right upper quadrant pain [R10.11]    · Acute on chronic combined systolic and diastolic congestive heart failure (HCC) [I50.43]       RUQ pain, suspect due to congestive hepatopathy, due to acute on chronic systolic CHF    - she had been on spironolactone and PRN metolazone.  Then last admission she was discharged with spironolactone and daily torsemide.      - Lasix gtt stopped 8/27.  Now on torsemide 50 mg BID and spironolactone 50 mg daily. - GI consulted and following.      - RUQ US unremarkable.      - has a history of alcoholism - sober since 2006 and on Suboxone.     - limit narcotics if possible.         Acute on chronic combined systolic and diastolic heart failure with fluid overload, LVEF of 25%    - not on ACE inhibitor or ARB due to CKD. - has implanted CardioMems unit    - no ACE/ARB/ARNI due to unstable renal status.      - cardiology and nephrology consulted and following.    - daily weights, I&O.    - diuretics as above.  Started on Dobutamine gtt 8/24, which she seems to be responding well to.         CKD3    - baseline Cr is probably about 1.5   Monitor during diuresis.      - nephrology consulted and following.         Hyponatremia    - it looks like her baseline is in the high 120's, even when accounting for hyperglycemia.  After a week of aggressive management, including multiple doses of tolvaptan, she still was only discharged at 126 last time.      - nephrology input noted. - tolvaptan given 8/27.         CAD    - aspirin, clopidogrel.  OK to resume high dose statin for now.  Monitor for worsening LFTs. - stress test was abnormal, so will need LHC at some point during her stay, possibly on Thursday.           DM2    - adjusted insulin regimen while here.  Recent A1c 7.2.         Anemia - due to blood loss. - 14.7 cm L perinephric subcapsular hematoma was stable on repeat CT on this admission.    - continued on iron.           Hypokalemia - on TID replacement. - follow BMP.         Morbid obesity - body mass index is 92.42 kg/m². Complicating assessment and treatment. Placing patient at risk for multiple co-morbidities as well as early death and contributing to the patient's presentation. Counseled on weight loss         Severe left subclavian stenosis - found on cardiac cath.     - vascular consulted and following.    - planning for subclavian stenting next week.                Abdominal Pain, Undiagnosed - Care Day 12 (8/29/2020) by Aren Hodges RN         Review Entered  Review Status    9/1/2020 12:33  Completed        Criteria Review       Care Day: 12 Care Date: 8/29/2020 Level of Care:    Guideline Day 2    Level Of Care (X) Floor to discharge    9/1/2020 12:33 PM EDT by Lizbet Gregory      [facility]    Clinical Status    (X) * Hemodynamic stability    9/1/2020 12:33 PM EDT by Lizbet Gregory      hr 86-90  bp: 108/70    ( ) * Pain absent or managed    (X) * Etiology or finding requiring inpatient treatment absent    (X) * Liquid or advanced diet tolerated    ( ) * Discharge plans and education understood    Activity    ( ) * Ambulatory or acceptable for next level of care [D]    Routes    ( ) * Oral hydration, medications, and diet    * Milestone    Additional Notes    8/29  Day 12       Pt remains on medsurg unit       Vitals:  36.7 p: 88 rr: 18 bp: 108/70 spo2: 96% ra       Abn labs:  gluc: 119       Orders: Accu checks ac/hs with ssi coverage    Renal function panel daily    Vascular surgery consult    Accu checks ac/hs with ssi coverage    Dobutamine gtt continuous       Per Nephrology PN:    Assessment/Plan:         SHAUNNA/CKD stage 3.    - Etiology: ATN (hypotension; contrast dye) from last admission    - Data: SCr peaked at 2.0, improved to 1.2mg/dL but now 2. 1.  Weight is down, still with swelling and volume up on pressure readings    - Inotrope added on 8/24/20.         CHF - acute on chronic    - On Dobutamine drip. - 2 liter/day fluid restriction    - Strict I's/O's, daily weights    - CardioMEMS monitoring per Cardiology    - On Torsemide, will add metolazone x 1 and tolvaptan to augment diuresis         Recent Perinephric hematoma. - Complication of L renal artery angioplasty / stent placement    - S/P coil embolization of subsegmental L renal artery    - Stable in size based on CT this admission         L Renal Artery Stenosis. - S/P L renal artery angioplasty / stent placement    - BP satisfactory. - Follow for late recurrence of HTN (post-perinephric bleed) (I.e, Page kidney).         Anemia - blood loss.     - Hgb stabilized in 8-9 range now    - S/P transfusion 2 units PRBC's last admission, on oral Fe.         Hyponatremia    - Has been present consistently since 4/20    - Related to volume excess / CHF              Hypokalemia.    - Stable on K replacement to 40 meq TID         PLAN:    - Metolazone    - Mucinex    - Seems to be making progress, BUN is down slightly which indicates better renal perfusion    - Will give a dose of tolvaptan today for hypervolemic hyponatremia    - Follow labs and electrolytes          Per Cardiology PN:    Assessment:      Acute on chronic combined systolic and diastolic congestive heart failure - improving w/ >20 liter diuresis    Right upper quadrant pain - not cardiac    Acute on chronic right-sided heart failure    CAD - CABG/PCI in past. stable w/o angina    ICMP    PAD    CKD - relatively stable w/ diuresis    DM    COPD    CardioMEMs implant (2/2019)    Obesity    HLD    Hypotension - improved and more stable         Plan    Aspirin and statin    Continue Spironolactone    Cont midodrine to maintain adequate BP    Attempt low dose BBlk as BP stable    No ACEi/ARB/ARNI due to hypotension and CKD.  Reconsider in future    Cont torsemide 50mg PO BID    Cont dobutamine another 24 hours     Daily weights, daily BMP

## 2020-09-01 NOTE — PLAN OF CARE
Patient was off the floor in procedure when attempted to see. Chart reviewed   - abs are stable. Still hyponatremic but stable, Cr stable   - I/O with good diuresis net > 3 liters negative, on torsemide and inotrope  - BP is better but remains on low side, Increased midodrine from 5 mg BID to 10 mg TID    We will continue to follow along with you. Please call us with any questions.

## 2020-09-01 NOTE — PRE SEDATION
History and Physical / Pre-Sedation Assessment    Patient:  Josefina Bryan  :   1965    Intended Procedure: L Subclavian Stenting      HPI: High grade left subclavian stenosis with retrograde flow in LIMA graft  Nurses notes reviewed and agreed. Medications reviewed  Allergies:   Lisinopril        Physical Exam:   CURRENT VITALS:  /76   Pulse 97   Temp 97.6 °F (36.4 °C) (Oral)   Resp 20   Ht 5' 4.5\" (1.638 m)   Wt 227 lb 4.8 oz (103.1 kg)   LMP 10/24/2012   SpO2 96%   BMI 38.41 kg/m²   Airway:Normal  Cardiac:Normal  Pulmonary:Normal  Abdomen:Normal      Pre-Procedure Assessment/Plan:  ASA 3 - Patient with moderate systemic disease with functional limitations    Mallampati Airway Assessment:  Mallampati Class III - (soft palate & base of uvula are visible)    Level of Sedation Plan: Moderate sedation    Post Procedure plan: Return to same level of care    I assessed the patient and find that the patient is in satisfactory condition to proceed with the planned procedure and sedation plan. I have explained the risk, benefits, and alternatives to the procedure. The patient understands and agrees to proceed.   Yes    Kevin [place]

## 2020-09-01 NOTE — PROGRESS NOTES
Hospitalist Progress Note      PCP: Matthew Ward PA-C    Date of Admission: 8/18/2020    Chief Complaint: RUQ pain    Hospital Course: The patient is a pleasant, chronically ill-appearing 62 Y F with a h/o COPD, HTN, HLD, DM2, CAD s/p stents and CABG, ischemic cardiomyopathy, and CHF.  She has extensive vascular disease and is s/p a LUE bypass, a LLE angioplasty (then again when it reoccluded), and a RLE bypass.  She continues to smoke.  She was recently diagnosed with L renal artery stenosis and underwent a L renal artery angioplasty and stent here on 8/4.  She was discharged home that day but returned to the ED a few hours later with L flank pain and was admitted with a L perinephric subcapsular hematoma.  She required 2u pRBCs and a coil embolization of a L renal artery branch on 8/5. Dwayne Guzman hospitalization was complicated by SHAUNNA and hyponatremia, and with the help of nephrology each of these issues improved a little by the time she was discharged on 8/12.               Now the patient presents with new sharp RUQ pain which radiates to her R posterior flank, R chest, and R shoulder.  It started around 8pm last night, and she denies having any pain in this area previously.  It hurts her badly to breath or move.  She is very tender in the RUQ.  She is s/p cholecystectomy.  Her LFTs have been newly abnormal for the last couple weeks. Subjective:   Patient is up in bed, comfortable, not in distress. Denies any chest pain or shortness of breath. No new event overnight noted.         Medications:  Reviewed    Infusion Medications    DOBUTamine 2.5 mcg/kg/min (09/01/20 9028)    dextrose       Scheduled Medications    midodrine  10 mg Oral TID WC    guaiFENesin  600 mg Oral BID    polyethylene glycol  17 g Oral Daily    potassium chloride  40 mEq Oral TID    torsemide  50 mg Oral BID    sodium chloride flush  10 mL Intravenous 2 times per day    docusate sodium  100 mg Oral Daily    ARIPiprazole  10 mg Oral Daily    aspirin EC  81 mg Oral Daily    benztropine  1 mg Oral Nightly    buprenorphine-naloxone  1 Film Sublingual BID    clopidogrel  75 mg Oral Daily    ferrous sulfate  325 mg Oral BID WC    lamoTRIgine  200 mg Oral BID    magnesium oxide  400 mg Oral Daily    pantoprazole  40 mg Oral BID    spironolactone  50 mg Oral Daily    tiotropium  2 puff Inhalation Daily    insulin lispro  0-12 Units Subcutaneous TID     insulin lispro  0-6 Units Subcutaneous Nightly    busPIRone  30 mg Oral BID    insulin glargine  18 Units Subcutaneous Nightly    atorvastatin  80 mg Oral Nightly    sodium chloride flush  10 mL Intravenous 2 times per day     PRN Meds: sodium chloride flush, acetaminophen, morphine, ondansetron, magnesium hydroxide, perflutren lipid microspheres, traMADol, traZODone, glucose, dextrose, glucagon (rDNA), dextrose, sodium chloride flush, acetaminophen **OR** acetaminophen, albuterol      Intake/Output Summary (Last 24 hours) at 9/1/2020 1354  Last data filed at 9/1/2020 1049  Gross per 24 hour   Intake 1136 ml   Output 4325 ml   Net -3189 ml       Physical Exam Performed:    /76   Pulse 97   Temp 97.6 °F (36.4 °C) (Oral)   Resp 20   Ht 5' 4.5\" (1.638 m)   Wt 227 lb 4.8 oz (103.1 kg)   LMP 10/24/2012   SpO2 96%   BMI 38.41 kg/m²     General appearance: No apparent distress, appears stated age and cooperative. HEENT: Pupils equal, round, and reactive to light. Conjunctivae/corneas clear. Neck: Supple, with full range of motion. No jugular venous distention. Trachea midline. Respiratory:  Normal respiratory effort. Clear to auscultation, bilaterally without Rales/Wheezes/Rhonchi. Cardiovascular: Regular rate and rhythm with normal S1/S2 without murmurs, rubs or gallops. Abdomen: Soft, non-tender, non-distended with normal bowel sounds. Musculoskeletal: No clubbing, cyanosis bilaterally. Full range of motion without deformity. 1+ BLE edema.     Skin: Skin color, texture, turgor normal.  No rashes or lesions. Neurologic:  Neurovascularly intact without any focal sensory/motor deficits. Cranial nerves: II-XII intact, grossly non-focal.  Psychiatric: Alert and oriented, thought content appropriate, normal insight  Capillary Refill: Brisk,< 3 seconds   Peripheral Pulses: +2 palpable, equal bilaterally       Labs:   Recent Labs     08/31/20  0417   WBC 5.9   HGB 10.6*   HCT 33.3*        Recent Labs     08/30/20  0425 08/31/20  0417 09/01/20  0515   * 130* 130*   K 4.5 3.8 4.0   CL 87* 86* 88*   CO2 28 26 26   BUN 42* 41* 41*   CREATININE 2.3* 2.1* 2.1*   CALCIUM 10.0 9.7 9.5   PHOS 3.4 4.0 3.1       Urinalysis:      Lab Results   Component Value Date    NITRU Negative 08/18/2020    WBCUA 0-2 08/18/2020    BACTERIA Rare 08/18/2020    RBCUA 0-2 08/18/2020    BLOODU Negative 08/18/2020    SPECGRAV 1.010 08/18/2020    GLUCOSEU Negative 08/18/2020       Radiology:  VL DUP CAROTID BILATERAL   Final Result      US GALLBLADDER RUQ   Final Result   Status post cholecystectomy, with normal caliber common duct for the post   cholecystectomy state. Fatty liver. NM Cardiac Stress Test Nuclear Imaging   Final Result      NM LUNG SCAN PERFUSION ONLY   Final Result   Low probability for pulmonary embolism. Assessment/Plan:    Active Hospital Problems    Diagnosis    Stenosis of left subclavian artery (HCC) [I77.1]    CKD (chronic kidney disease) stage 4, GFR 15-29 ml/min (HCC) [N18.4]    Acute on chronic right-sided heart failure (HCC) [I50.813]    Right upper quadrant pain [R10.11]    Acute on chronic combined systolic and diastolic congestive heart failure (HCC) [I50.43]     RUQ pain, suspect due to congestive hepatopathy, due to acute on chronic systolic CHF  - she had been on spironolactone and PRN metolazone.  Then last admission she was discharged with spironolactone and daily torsemide.    - Lasix gtt stopped 8/27.   Now on torsemide 50 mg Status: not indicated    Dispo -  2-4 days     Adriel Mata MD

## 2020-09-01 NOTE — PROGRESS NOTES
Vascular    Left Subclavian angio/stenting performed. Post stent normal antegrade flow noted in LIMA graft. Note dictated.

## 2020-09-01 NOTE — ADT AUTH CERT
Abdominal Pain, Undiagnosed - Care Day 11 (8/28/2020) by Sheridan Uribe RN         Review Entered  Review Status    8/28/2020 15:47  Completed        Criteria Review       Care Day: 11 Care Date: 8/28/2020 Level of Care:    Guideline Day 2    Level Of Care    (X) Floor to discharge    8/28/2020 3:47 PM EDT by Lizbet Gregory      [email or web address]    Clinical Status    (X) * Hemodynamic stability    8/28/2020 3:47 PM EDT by Lizbet Gregory      hr 90-97  bp: 132/85    ( ) * Pain absent or managed    (X) * Etiology or finding requiring inpatient treatment absent    (X) * Liquid or advanced diet tolerated    ( ) * Discharge plans and education understood    Activity    ( ) * Ambulatory or acceptable for next level of care [D]    Routes    ( ) * Oral hydration, medications, and diet    * Milestone    Additional Notes    8/28  Day 11       Pt remains on medsurg unit       Vitals: t: 36.7 p: 92 rr: 18 bp: 132/85 spo2: 96% ra       Abn labs: nbp: 4433, na: 131, k+: 3.3, bun: 40, cr: 2.1       Orders: Accu checks ac/hs with ssi coverage    Renal function panel daily    Vascular surgery consul       Per Cardiothoracic surgery PN:    Pt known to service: 1/27/20 Urgent coronary artery bypass grafting surgery x1 with pedicled left internal mammary artery to the LAD. Cardiopulmonary bypass with on-pump beating-heart technique, no cross-clamp. Transesophageal echo. Epiaortic ultrasound. [place] catheter placement. Doppler verification of grafts. Bilateral five-level intercostal nerve block with Exparel. Platelet gel application. Sternal plating. Vas-Cath placement.           Our service was consulted for coronary steal by cardiology. Dr. Luis Alfredo Cabral reviewed patient's recent imaging and discussed it with her vascular surgeon (Dr. Senthil Perez). He is scheduling pt for stenting of her left subclavian stenosis early next week. We will sign off as our services are not needed and her bypass appears patent.  Call if there are due to blood loss. - 14.7 cm L perinephric subcapsular hematoma was stable on repeat CT on this admission.    - continued on iron.           Hypokalemia - on BID replacement. - follow BMP.         Morbid obesity - body mass index is 41.26 kg/m². Complicating assessment and treatment. Placing patient at risk for multiple co-morbidities as well as early death and contributing to the patient's presentation. Counseled on weight loss         Severe left subclavian stenosis - found on cardiac cath.     - vascular consulted and following.    - planning for subclavian stenting next week.         Per Cardiology PN:    Assessment/Plan:    · CHF, acute on chronic combined and right heart failure - remains fluid overloaded    · Cardiomyopathy, ischemic - EF 25% by limited echo, unchanged; not on ACEi/ARB/ARNI due to hypotension and CKD    · CAD s/p PCI's and CABG - stress test with lateral reversible ischemia, CABG to LAD, LCx and RCA still with disease, on high dose statin, DAPT    · Hypotension - on midodrine, stable    · HLD    · CKD3    · DM    · Jaundice    · Left subclavian stenosis         Plan:    Aspirin and statin    Continue Spironolactone (aldactone)    On midodrine 5mg BID    Unable to start beta blocker due to hypotension    Unable to start ACEi/ARB/ARNI due to hypotension and CKD    Restart torsemide 50mg PO BID    Decrease dobutamine in 1/2 for the next 24 hours    Daily weights, daily BMP

## 2020-09-02 VITALS
WEIGHT: 225.4 LBS | SYSTOLIC BLOOD PRESSURE: 113 MMHG | OXYGEN SATURATION: 96 % | TEMPERATURE: 98.2 F | HEART RATE: 95 BPM | HEIGHT: 65 IN | RESPIRATION RATE: 18 BRPM | BODY MASS INDEX: 37.55 KG/M2 | DIASTOLIC BLOOD PRESSURE: 87 MMHG

## 2020-09-02 LAB
ALBUMIN SERPL-MCNC: 4 G/DL (ref 3.4–5)
ANION GAP SERPL CALCULATED.3IONS-SCNC: 17 MMOL/L (ref 3–16)
BUN BLDV-MCNC: 39 MG/DL (ref 7–20)
CALCIUM SERPL-MCNC: 9.6 MG/DL (ref 8.3–10.6)
CHLORIDE BLD-SCNC: 92 MMOL/L (ref 99–110)
CO2: 24 MMOL/L (ref 21–32)
CREAT SERPL-MCNC: 2.3 MG/DL (ref 0.6–1.1)
GFR AFRICAN AMERICAN: 26
GFR NON-AFRICAN AMERICAN: 22
GLUCOSE BLD-MCNC: 147 MG/DL (ref 70–99)
GLUCOSE BLD-MCNC: 151 MG/DL (ref 70–99)
GLUCOSE BLD-MCNC: 164 MG/DL (ref 70–99)
PERFORMED ON: ABNORMAL
PERFORMED ON: ABNORMAL
PHOSPHORUS: 3 MG/DL (ref 2.5–4.9)
POTASSIUM SERPL-SCNC: 3.9 MMOL/L (ref 3.5–5.1)
PRO-BNP: 3544 PG/ML (ref 0–124)
SODIUM BLD-SCNC: 133 MMOL/L (ref 136–145)

## 2020-09-02 PROCEDURE — 6370000000 HC RX 637 (ALT 250 FOR IP): Performed by: INTERNAL MEDICINE

## 2020-09-02 PROCEDURE — 6370000000 HC RX 637 (ALT 250 FOR IP): Performed by: NURSE PRACTITIONER

## 2020-09-02 PROCEDURE — 80069 RENAL FUNCTION PANEL: CPT

## 2020-09-02 PROCEDURE — 36415 COLL VENOUS BLD VENIPUNCTURE: CPT

## 2020-09-02 PROCEDURE — 83880 ASSAY OF NATRIURETIC PEPTIDE: CPT

## 2020-09-02 PROCEDURE — 94640 AIRWAY INHALATION TREATMENT: CPT

## 2020-09-02 PROCEDURE — 99233 SBSQ HOSP IP/OBS HIGH 50: CPT | Performed by: NURSE PRACTITIONER

## 2020-09-02 RX ORDER — TORSEMIDE 20 MG/1
60 TABLET ORAL DAILY
Qty: 90 TABLET | Refills: 3 | Status: SHIPPED | OUTPATIENT
Start: 2020-09-03 | End: 2020-01-01 | Stop reason: DRUGHIGH

## 2020-09-02 RX ORDER — POTASSIUM CHLORIDE 20 MEQ/1
40 TABLET, EXTENDED RELEASE ORAL 3 TIMES DAILY
Qty: 180 TABLET | Refills: 3 | Status: ON HOLD
Start: 2020-09-02 | End: 2020-01-01 | Stop reason: SDUPTHER

## 2020-09-02 RX ORDER — MIDODRINE HYDROCHLORIDE 10 MG/1
10 TABLET ORAL
Qty: 90 TABLET | Refills: 3 | Status: ON HOLD | OUTPATIENT
Start: 2020-09-02 | End: 2020-01-01 | Stop reason: CLARIF

## 2020-09-02 RX ORDER — TORSEMIDE 20 MG/1
60 TABLET ORAL DAILY
Status: DISCONTINUED | OUTPATIENT
Start: 2020-09-03 | End: 2020-09-02 | Stop reason: HOSPADM

## 2020-09-02 RX ADMIN — SPIRONOLACTONE 50 MG: 25 TABLET ORAL at 08:24

## 2020-09-02 RX ADMIN — TORSEMIDE 50 MG: 100 TABLET ORAL at 06:00

## 2020-09-02 RX ADMIN — MIDODRINE HYDROCHLORIDE 10 MG: 5 TABLET ORAL at 11:32

## 2020-09-02 RX ADMIN — PANTOPRAZOLE SODIUM 40 MG: 40 TABLET, DELAYED RELEASE ORAL at 08:25

## 2020-09-02 RX ADMIN — MIDODRINE HYDROCHLORIDE 10 MG: 5 TABLET ORAL at 08:25

## 2020-09-02 RX ADMIN — LAMOTRIGINE 200 MG: 100 TABLET ORAL at 08:25

## 2020-09-02 RX ADMIN — BUSPIRONE HYDROCHLORIDE 30 MG: 5 TABLET ORAL at 08:24

## 2020-09-02 RX ADMIN — BUPRENORPHINE AND NALOXONE 1 FILM: 8; 2 FILM BUCCAL; SUBLINGUAL at 00:43

## 2020-09-02 RX ADMIN — CLOPIDOGREL BISULFATE 75 MG: 75 TABLET ORAL at 08:24

## 2020-09-02 RX ADMIN — ARIPIPRAZOLE 10 MG: 10 TABLET ORAL at 08:24

## 2020-09-02 RX ADMIN — POTASSIUM CHLORIDE 40 MEQ: 20 TABLET, EXTENDED RELEASE ORAL at 08:25

## 2020-09-02 RX ADMIN — MAGNESIUM OXIDE TAB 400 MG (241.3 MG ELEMENTAL MG) 400 MG: 400 (241.3 MG) TAB at 08:24

## 2020-09-02 RX ADMIN — GUAIFENESIN 600 MG: 600 TABLET, EXTENDED RELEASE ORAL at 08:24

## 2020-09-02 RX ADMIN — TIOTROPIUM BROMIDE INHALATION SPRAY 2 PUFF: 3.12 SPRAY, METERED RESPIRATORY (INHALATION) at 07:42

## 2020-09-02 RX ADMIN — ASPIRIN 81 MG: 81 TABLET, COATED ORAL at 08:25

## 2020-09-02 RX ADMIN — FERROUS SULFATE TAB 325 MG (65 MG ELEMENTAL FE) 325 MG: 325 (65 FE) TAB at 08:24

## 2020-09-02 RX ADMIN — BUPRENORPHINE AND NALOXONE 1 FILM: 8; 2 FILM BUCCAL; SUBLINGUAL at 08:15

## 2020-09-02 NOTE — PROGRESS NOTES
Nephrology Progress Note   [email or web address]      This patient is a 62year old female whom we are following for SHAUNNA on CKD. Subjective:  Doing better today per her and asking about d/c     Family History: No family at bedside  ROS: No fever or chills, + swelling. No SOB. Vitals:  /87   Pulse 95   Temp 98.2 °F (36.8 °C) (Oral)   Resp 18   Ht 5' 4.5\" (1.638 m)   Wt 225 lb 6.4 oz (102.2 kg)   LMP 10/24/2012   SpO2 96%   BMI 38.09 kg/m²   I/O last 3 completed shifts: In: 818.9 [P.O.:240;  I.V.:578.9]  Out: 2900 [Urine:2900]  I/O this shift:  In: 370 [P.O.:360; I.V.:10]  Out: 600 [Urine:600]    Physical Exam:    Gen: Resting in bed, in no acute distress  Head: NC , AT  Neck: supple, trachea in midline, no overt thyromegaly noted  CV: S1, S2 heard ,+1 peripheral edema, no JVD    Lungs: noincreased WOB, no abdominal breathing pattern, +b/l air entry, no crackles  Abd: Soft, not tender , not distended , no guarding   Neuro: alert and awake, moves extremities spontaneously  Skin: no rash/lesions on exposed skin  Ext: no cyanosis no clubbing on fingers      Medications:   [START ON 9/3/2020] torsemide  60 mg Oral Daily    midodrine  10 mg Oral TID WC    guaiFENesin  600 mg Oral BID    polyethylene glycol  17 g Oral Daily    potassium chloride  40 mEq Oral TID    sodium chloride flush  10 mL Intravenous 2 times per day    docusate sodium  100 mg Oral Daily    ARIPiprazole  10 mg Oral Daily    aspirin EC  81 mg Oral Daily    benztropine  1 mg Oral Nightly    buprenorphine-naloxone  1 Film Sublingual BID    clopidogrel  75 mg Oral Daily    ferrous sulfate  325 mg Oral BID WC    lamoTRIgine  200 mg Oral BID    magnesium oxide  400 mg Oral Daily    pantoprazole  40 mg Oral BID    spironolactone  50 mg Oral Daily    tiotropium  2 puff Inhalation Daily    insulin lispro  0-12 Units Subcutaneous TID     insulin lispro  0-6 Units Subcutaneous Nightly    busPIRone  30 mg Oral BID    insulin glargine  18 Units Subcutaneous Nightly    atorvastatin  80 mg Oral Nightly    sodium chloride flush  10 mL Intravenous 2 times per day         Labs:  Recent Labs     08/31/20 0417   WBC 5.9   HGB 10.6*   HCT 33.3*   MCV 84.1        Recent Labs     08/31/20  0417 09/01/20  0515 09/02/20  0550   * 130* 133*   K 3.8 4.0 3.9   CL 86* 88* 92*   CO2 26 26 24   GLUCOSE 108* 118* 147*   PHOS 4.0 3.1 3.0   BUN 41* 41* 39*   CREATININE 2.1* 2.1* 2.3*   LABGLOM 24* 24* 22*   GFRAA 29* 29* 26*           Assessment/Plan:    SHAUNNA/CKD stage 3: better/stable   - Etiology: ATN (hypotension; contrast dye) from last admission  - Data: SCr peaked at 2.0, improved to 1.2mg/dL but then worsened again. Weight is down  - Inotrope added on 8/24/20  -  Midodrine for BP support   - okay to d/c from my standpoint     Left subclavian stenosis :  S/p angioplasty 9/1    CHF - acute on chronic  - required Dobutamine drip. - 2 liter/day fluid restriction  - Strict I's/O's, daily weights   - CardioMEMS monitoring per Cardiology  - On Torsemide     Recent Perinephric hematoma. - Complication of L renal artery angioplasty / stent placement  - S/P coil embolization of subsegmental L renal artery  - Stable in size based on CT this admission     L Renal Artery Stenosis. - S/P L renal artery angioplasty / stent placement  - BP appropriate. - Follow for late recurrence of HTN (post-perinephric bleed) (I.e, [place] kidney).       Hyponatremia: better  - Has been present consistently since 4/20   - Related to volume excess / CHF          Hypokalemia.  - Stable on K replacement       Please do not hesitate to contact me at [phone] if with questions. Thank you!     Jovan Urbina MD  9/2/2020  [facility]

## 2020-09-02 NOTE — PLAN OF CARE
Problem: FLUID AND ELECTROLYTE IMBALANCE  Goal: Fluid and electrolyte balance are achieved/maintained  Outcome: Ongoing  Note: Patient's EF (Ejection Fraction) is less than 40%    Heart Failure Medications:  Diuretics[de-identified] Spirondactone, torsemide    (One of the following REQUIRED for EF <40%/SYSTOLIC FAILURE but MAY be used in EF% >40%/DIASTOLIC FAILURE)        ACE[de-identified] None        ARB[de-identified] None         ARNI[de-identified] None    (Beta Blockers)  NON- Evidenced Based Beta Blocker (for EF% >40%/WRYI1ISSAP FAILURE):None    Evidenced Based Beta Blocker::(REQUIRED for EF% <40%/SYSTOLIC FAILURE) None  . .................................................................................................................................................. Patient's weights and intake/output reviewed: Yes    Patient's Last Weight: 227lbs obtained by standing scale. Difference of 5 lbs less than last documented weight. Intake/Output Summary (Last 24 hours) at 9/1/2020 2343  Last data filed at 9/1/2020 2200  Gross per 24 hour   Intake 776 ml   Output 3350 ml   Net -2574 ml       Comorbidities Reviewed Yes    Patient has a past medical history of Acute kidney injury ([address] Acute on chronic congestive heart failure ([address] Alcohol dependence ([address] Bipolar 1 disorder ([address] CAD (coronary artery disease), Cardiomyopathy ([address] Cellulitis, CHF (congestive heart failure) ([address] COPD (chronic obstructive pulmonary disease) ([address] Diabetic ulcer of left foot associated with type 2 diabetes mellitus ([address] Diabetic ulcer of toe of right foot associated with type 2 diabetes mellitus ([facility] GERD (gastroesophageal reflux disease), High cholesterol, HTN (hypertension), Kidney disease, chronic, stage II (mild, EGFR 60+ ml/min), MI (myocardial infarction) ([facility] Positive FIT (fecal immunochemical test), Pulmonary nodule, and PVD (peripheral vascular disease) ([facility]      >>For CHF and Comorbidity documentation on Education Time and Topics, please see Education Tab    Progressive Mobility Assessment:  What is this patient's Current Level of Mobility?: Ambulatory-Up Ad Jasmin  How was this patient Mobilized today?: Up in Room                 With Whom? PCA                 Level of Difficulty/Assistance: Independent     Pt resting in bed at this time on room air. Pt denies shortness of breath. Pt with pitting lower extremity edema.      Patient and/or Family's stated Goal of Care this Admission: better understand heart failure and disease management prior to discharge

## 2020-09-02 NOTE — DISCHARGE SUMMARY
Hospital Medicine Discharge Summary    Patient ID: Evy Gauthier      Patient's PCP: Jimmy Yoder PA-C    Admit Date: 8/18/2020     Discharge Date:   9/2/2020    Admitting Physician: Martín Ghotra MD     Discharge Physician: Emmanuel Oliveira MD     Discharge Diagnoses: Active Hospital Problems    Diagnosis    Mitral valve regurgitation [I34.0]     Priority: High     Class: Acute    CAD (coronary artery disease) [I25.10]     Priority: High    Stenosis of left subclavian artery ([facility]) [I77.1]    CKD (chronic kidney disease) stage 4, GFR 15-29 ml/min ([facility]) [N18.4]    Acute on chronic right-sided heart failure ([facility]) [I50.813]    Right upper quadrant pain [R10.11]    Cardiomyopathy ([facility] [I42.9]    Acute on chronic combined systolic and diastolic congestive heart failure ([facility]) [I50.43]    Stage 3 chronic kidney disease ([facility] [N18.3]    Hyponatremia [E87.1]    PAD (peripheral artery disease) ([facility]) [I73.9]    Type 2 diabetes mellitus with diabetic polyneuropathy, with long-term current use of insulin ([facility]) [E11.42, Z79.4]       The patient was seen and examined on day of discharge and this discharge summary is in conjunction with any daily progress note from day of discharge. Hospital Course:     RUQ pain, suspect due to congestive hepatopathy, due to acute on chronic systolic CHF  - she had been on spironolactone and PRN metolazone.  Then last admission she was discharged with spironolactone and daily torsemide.    - Lasix gtt stopped 8/27. Now on torsemide 50 mg BID and spironolactone 50 mg daily. - GI consulted and following.    - RUQ US unremarkable. - has a history of alcoholism - sober since 2006 and on Suboxone. - limit narcotics if possible.     Acute on chronic combined systolic and diastolic heart failure with fluid overload, LVEF of 25%  - not on ACE inhibitor or ARB due to CKD.    - has implanted CardioMems unit   - no ACE/ARB/ARNI due to unstable renal status.    - cardiology and nephrology consulted and following.  - daily weights, I&O.  - diuretics as above. Started on Dobutamine gtt 8/24, which she seems to be responding well to.     CKD3  - baseline Cr is probably about 1.5   Monitor during diuresis.    - nephrology consulted and following.     Hyponatremia  - it looks like her baseline is in the high 120's, even when accounting for hyperglycemia.  After a week of aggressive management, including multiple doses of tolvaptan, she still was only discharged at 126 last time.    - nephrology input noted. - tolvaptan given 8/27.     CAD  - aspirin, clopidogrel.  OK to resume high dose statin for now.  Monitor for worsening LFTs. - stress test was abnormal, so will need LHC at some point during her stay, possibly on Thursday.       DM2  - adjusted insulin regimen while here.  Recent A1c 7.2.     Anemia - due to blood loss. - 14.7 cm L perinephric subcapsular hematoma was stable on repeat CT on this admission.   - continued on iron.       Hypokalemia - on TID replacement. - follow BMP.     Morbid obesity - body mass index is 48.45 kg/m². Complicating assessment and treatment. Placing patient at risk for multiple co-morbidities as well as early death and contributing to the patient's presentation. Counseled on weight loss     Severe left subclavian stenosis - found on cardiac cath. - vascular consulted and following.  - S/p Left subclavian angio/stent done on 9/1/2020. Physical Exam Performed:     /87   Pulse 95   Temp 98.2 °F (36.8 °C) (Oral)   Resp 18   Ht 5' 4.5\" (1.638 m)   Wt 225 lb 6.4 oz (102.2 kg)   LMP 10/24/2012   SpO2 96%   BMI 38.09 kg/m²       General appearance:  No apparent distress, appears stated age and cooperative. HEENT:  Normal cephalic, atraumatic without obvious deformity. Pupils equal, round, and reactive to light. Extra ocular muscles intact. Conjunctivae/corneas clear. Neck: Supple, with full range of motion.  No jugular venous distention. Trachea midline. Respiratory:  Normal respiratory effort. Clear to auscultation, bilaterally without Rales/Wheezes/Rhonchi. Cardiovascular:  Regular rate and rhythm with normal S1/S2 without murmurs, rubs or gallops. Abdomen: Soft, non-tender, non-distended with normal bowel sounds. Musculoskeletal:  No clubbing, cyanosis or edema bilaterally. Full range of motion without deformity. Skin: Skin color, texture, turgor normal.  No rashes or lesions. Neurologic:  Neurovascularly intact without any focal sensory/motor deficits. Cranial nerves: II-XII intact, grossly non-focal.  Psychiatric:  Alert and oriented, thought content appropriate, normal insight  Capillary Refill: Brisk,< 3 seconds   Peripheral Pulses: +2 palpable, equal bilaterally       Labs: For convenience and continuity at follow-up the following most recent labs are provided:      CBC:    Lab Results   Component Value Date    WBC 5.9 08/31/2020    HGB 10.6 08/31/2020    HCT 33.3 08/31/2020     08/31/2020       Renal:    Lab Results   Component Value Date     09/02/2020    K 3.9 09/02/2020    K 2.8 08/18/2020    CL 92 09/02/2020    CO2 24 09/02/2020    BUN 39 09/02/2020    CREATININE 2.3 09/02/2020    CALCIUM 9.6 09/02/2020    PHOS 3.0 09/02/2020         Significant Diagnostic Studies    Radiology:   VL DUP CAROTID BILATERAL   Final Result      US GALLBLADDER RUQ   Final Result   Status post cholecystectomy, with normal caliber common duct for the post   cholecystectomy state. Fatty liver. NM Cardiac Stress Test Nuclear Imaging   Final Result      NM LUNG SCAN PERFUSION ONLY   Final Result   Low probability for pulmonary embolism.                 Consults:     IP CONSULT TO CARDIOLOGY  IP CONSULT TO NEPHROLOGY  IP CONSULT TO GI  IP CONSULT TO VASCULAR SURGERY  IP CONSULT TO HOME CARE NEEDS    Disposition:  Home     Condition at Discharge: Stable    Discharge Instructions/Follow-up:  Nephrology, Cardiology, counseling and review of medications and discharge plan. Signed:    Bety Adair MD   9/2/2020      Thank you Ruiz Gavin PA-C for the opportunity to be involved in this patient's care. If you have any questions or concerns please feel free to contact me at [phone].

## 2020-09-02 NOTE — PROGRESS NOTES
Hospitalist Progress Note      PCP: Richard Petty PA-C    Date of Admission: 8/18/2020    Chief Complaint: RUQ pain    Hospital Course: The patient is a pleasant, chronically ill-appearing 62 Y F with a h/o COPD, HTN, HLD, DM2, CAD s/p stents and CABG, ischemic cardiomyopathy, and CHF.  She has extensive vascular disease and is s/p a LUE bypass, a LLE angioplasty (then again when it reoccluded), and a RLE bypass.  She continues to smoke.  She was recently diagnosed with L renal artery stenosis and underwent a L renal artery angioplasty and stent here on 8/4.  She was discharged home that day but returned to the ED a few hours later with L flank pain and was admitted with a L perinephric subcapsular hematoma.  She required 2u pRBCs and a coil embolization of a L renal artery branch on 8/5. Janet Lanier hospitalization was complicated by SHAUNNA and hyponatremia, and with the help of nephrology each of these issues improved a little by the time she was discharged on 8/12.               Now the patient presents with new sharp RUQ pain which radiates to her R posterior flank, R chest, and R shoulder.  It started around 8pm last night, and she denies having any pain in this area previously.  It hurts her badly to breath or move.  She is very tender in the RUQ.  She is s/p cholecystectomy.  Her LFTs have been newly abnormal for the last couple weeks. Subjective:   Patient is up in bed, comfortable, not in distress. Denies any chest pain or shortness of breath. No new event overnight noted.         Medications:  Reviewed    Infusion Medications    dextrose       Scheduled Medications    [START ON 9/3/2020] torsemide  60 mg Oral Daily    midodrine  10 mg Oral TID     guaiFENesin  600 mg Oral BID    polyethylene glycol  17 g Oral Daily    potassium chloride  40 mEq Oral TID    sodium chloride flush  10 mL Intravenous 2 times per day    docusate sodium  100 mg Oral Daily    ARIPiprazole  10 mg Oral Daily    aspirin EC  81 mg Oral Daily    benztropine  1 mg Oral Nightly    buprenorphine-naloxone  1 Film Sublingual BID    clopidogrel  75 mg Oral Daily    ferrous sulfate  325 mg Oral BID WC    lamoTRIgine  200 mg Oral BID    magnesium oxide  400 mg Oral Daily    pantoprazole  40 mg Oral BID    spironolactone  50 mg Oral Daily    tiotropium  2 puff Inhalation Daily    insulin lispro  0-12 Units Subcutaneous TID     insulin lispro  0-6 Units Subcutaneous Nightly    busPIRone  30 mg Oral BID    insulin glargine  18 Units Subcutaneous Nightly    atorvastatin  80 mg Oral Nightly    sodium chloride flush  10 mL Intravenous 2 times per day     PRN Meds: sodium chloride flush, acetaminophen, morphine, ondansetron, magnesium hydroxide, perflutren lipid microspheres, traMADol, traZODone, glucose, dextrose, glucagon (rDNA), dextrose, sodium chloride flush, acetaminophen **OR** acetaminophen, albuterol      Intake/Output Summary (Last 24 hours) at 9/2/2020 1202  Last data filed at 9/2/2020 1133  Gross per 24 hour   Intake 652.9 ml   Output 2950 ml   Net -2297.1 ml       Physical Exam Performed:    /87   Pulse 95   Temp 98.2 °F (36.8 °C) (Oral)   Resp 18   Ht 5' 4.5\" (1.638 m)   Wt 225 lb 6.4 oz (102.2 kg)   LMP 10/24/2012   SpO2 96%   BMI 38.09 kg/m²     General appearance: No apparent distress, appears stated age and cooperative. HEENT: Pupils equal, round, and reactive to light. Conjunctivae/corneas clear. Neck: Supple, with full range of motion. No jugular venous distention. Trachea midline. Respiratory:  Normal respiratory effort. Clear to auscultation, bilaterally without Rales/Wheezes/Rhonchi. Cardiovascular: Regular rate and rhythm with normal S1/S2 without murmurs, rubs or gallops. Abdomen: Soft, non-tender, non-distended with normal bowel sounds. Musculoskeletal: No clubbing, cyanosis bilaterally. Full range of motion without deformity. 1+ BLE edema.     Skin: Skin color, texture, turgor normal.  No rashes or lesions. Neurologic:  Neurovascularly intact without any focal sensory/motor deficits. Cranial nerves: II-XII intact, grossly non-focal.  Psychiatric: Alert and oriented, thought content appropriate, normal insight  Capillary Refill: Brisk,< 3 seconds   Peripheral Pulses: +2 palpable, equal bilaterally       Labs:   Recent Labs     08/31/20  0417   WBC 5.9   HGB 10.6*   HCT 33.3*        Recent Labs     08/31/20  0417 09/01/20  0515 09/02/20  0550   * 130* 133*   K 3.8 4.0 3.9   CL 86* 88* 92*   CO2 26 26 24   BUN 41* 41* 39*   CREATININE 2.1* 2.1* 2.3*   CALCIUM 9.7 9.5 9.6   PHOS 4.0 3.1 3.0       Urinalysis:      Lab Results   Component Value Date    NITRU Negative 08/18/2020    WBCUA 0-2 08/18/2020    BACTERIA Rare 08/18/2020    RBCUA 0-2 08/18/2020    BLOODU Negative 08/18/2020    SPECGRAV 1.010 08/18/2020    GLUCOSEU Negative 08/18/2020       Radiology:  VL DUP CAROTID BILATERAL   Final Result      US GALLBLADDER RUQ   Final Result   Status post cholecystectomy, with normal caliber common duct for the post   cholecystectomy state. Fatty liver. NM Cardiac Stress Test Nuclear Imaging   Final Result      NM LUNG SCAN PERFUSION ONLY   Final Result   Low probability for pulmonary embolism.                  Assessment/Plan:    Active Hospital Problems    Diagnosis    Mitral valve regurgitation [I34.0]     Priority: High     Class: Acute    CAD (coronary artery disease) [I25.10]     Priority: High    Stenosis of left subclavian artery (HCC) [I77.1]    CKD (chronic kidney disease) stage 4, GFR 15-29 ml/min ([facility]) [N18.4]    Acute on chronic right-sided heart failure (HCC) [I50.813]    Right upper quadrant pain [R10.11]    Cardiomyopathy ([facility] [address] [I42.9]    Acute on chronic combined systolic and diastolic congestive heart failure (HCC) [I50.43]    Stage 3 chronic kidney disease ([facility] [address] [N18.3]    Hyponatremia [E87.1]    PAD (peripheral artery disease) ([facility] [address] [I73.9]  Type 2 diabetes mellitus with diabetic polyneuropathy, with long-term current use of insulin (HCC) [E11.42, Z79.4]     RUQ pain, suspect due to congestive hepatopathy, due to acute on chronic systolic CHF  - she had been on spironolactone and PRN metolazone.  Then last admission she was discharged with spironolactone and daily torsemide.    - Lasix gtt stopped 8/27. Now on torsemide 50 mg BID and spironolactone 50 mg daily. - GI consulted and following.    - RUQ US unremarkable. - has a history of alcoholism - sober since 2006 and on Suboxone. - limit narcotics if possible.     Acute on chronic combined systolic and diastolic heart failure with fluid overload, LVEF of 25%  - not on ACE inhibitor or ARB due to CKD. - has implanted CardioMems unit   - no ACE/ARB/ARNI due to unstable renal status.    - cardiology and nephrology consulted and following.  - daily weights, I&O.  - diuretics as above. Started on Dobutamine gtt 8/24, which she seems to be responding well to. CKD3  - baseline Cr is probably about 1.5   Monitor during diuresis.    - nephrology consulted and following.     Hyponatremia  - it looks like her baseline is in the high 120's, even when accounting for hyperglycemia.  After a week of aggressive management, including multiple doses of tolvaptan, she still was only discharged at 126 last time.    - nephrology input noted. - tolvaptan given 8/27. CAD  - aspirin, clopidogrel.  OK to resume high dose statin for now. Monitor for worsening LFTs. - stress test was abnormal, so will need C at some point during her stay, possibly on Thursday.       DM2  - adjusted insulin regimen while here.  Recent A1c 7.2.     Anemia - due to blood loss. - 14.7 cm L perinephric subcapsular hematoma was stable on repeat CT on this admission.   - continued on iron. Hypokalemia - on TID replacement. - follow BMP. Morbid obesity - body mass index is 42.72 kg/m².  Complicating assessment and treatment. Placing patient at risk for multiple co-morbidities as well as early death and contributing to the patient's presentation. Counseled on weight loss    Severe left subclavian stenosis - found on cardiac cath. - vascular consulted and following.  - S/p Left subclavian angio/stent done on 9/1/2020.     DVT Prophylaxis: SCDs  Diet: No diet orders on file  Code Status: Full Code    PT/OT Eval Status: not indicated    Dispo -  Possible today    Melanie Echevarria MD

## 2020-09-02 NOTE — OP NOTE
[address] [address]                                OPERATIVE REPORT    PATIENT NAME: Damian Kincaid                    :        1965  MED REC NO:   [id number]                          ROOM:       0421  ACCOUNT NO:   [de-identified]                           ADMIT DATE: 2020  PROVIDER:     Paulie Arreguin MD    DATE OF PROCEDURE:  2020    PREOPERATIVE DIAGNOSES:  Left subclavian artery stenosis with coronary  steal syndrome. POSTOPERATIVE DIAGNOSES:  Left subclavian artery stenosis with coronary  steal syndrome. PROCEDURES PERFORMED:  1. Selective left subclavian angiogram.  2.  Placement of catheter in the subclavian artery. 3.  Angioplasty and stenting of the subclavian artery. 4.  Left upper extremity angiogram.    ANESTHESIA:  Local with moderate monitored sedation. INDICATIONS:  The patient is 47-year-old female with prior history of  left axillary to brachial artery bypass and also coronary artery bypass  grafting using an internal mammary graft. The patient has recurrent  congestive heart failure symptoms, underwent cardiac catheterization and  a high-grade stenosis was noted in the subclavian artery as well as  retrograde flow in the LIMA graft. The patient is brought to the angio  suite at this time to undergo angiography with intervention. PROCEDURE:  The patient was brought to the angio suite and placed in  supine position. Under my direct supervision, Versed and fentanyl were  administered for moderate sedation. The patient was monitored by an  independent trained nurse observer using continuous blood pressure, EKG  and pulse oximetry. I spent 40 minutes face-to-face with the patient  providing and directing sedation. The right groin was prepped draped in  sterile fashion. Ultrasound was used to identify the common femoral  artery. This was patent and pulsatile.   Under direct

## 2020-09-02 NOTE — FLOWSHEET NOTE
09/01/20 2020   Assessment   Charting Type Shift assessment   Neurological   Neuro (WDL) WDL   Level of Consciousness 0   Orientation Level Oriented X4;Oriented to place;Oriented to time;Oriented to situation;Oriented to person   Cognition Appropriate judgement; Appropriate safety awareness; Appropriate attention/concentration; Follows commands   Language Clear; Appropriate for developmental age   [de-identified] Coma Scale   Eye Opening 4   Best Verbal Response 5   Best Motor Response 6   [place] Coma Scale Score 15   HEENT   HEENT (WDL) X   Right Eye Impaired vision   Left Eye Impaired vision   Teeth Partial plate lower   Respiratory   Respiratory (WDL) WDL   Respiratory Pattern Regular   Respiratory Depth Normal   Respiratory Quality/Effort Unlabored   Chest Assessment Trachea midline; Chest expansion symmetrical   L Breath Sounds Clear   R Breath Sounds Clear   Cardiac   Cardiac (WDL) WDL   Cardiac Regularity Regular   Heart Sounds S1, S2   Cardiac Rhythm NSR   Ectopy PVC   Ectopy Frequency Occasional   Cardiac Monitor   Telemetry Monitor On Yes   Telemetry Audible Yes   Telemetry Alarms Set Yes   Gastrointestinal   Abdominal (WDL) X   RUQ Bowel Sounds Active   LUQ Bowel Sounds Active   RLQ Bowel Sounds Active   LLQ Bowel Sounds Active   Abdomen Inspection Distended; Soft   Tenderness Soft; No guarding;Nontender   Nausea Precipitating Factors Medication   Passing Flatus Y   Constipation Precipitating Factors Medication (iron supplements, opiods, antidepressants etc)   Liver/Spleen Palpation Hepatomegaly   Peripheral Vascular   Peripheral Vascular (WDL) X   Edema Generalized   Edema Generalized +1   RLE Edema Non-pitting   LLE Edema Non-pitting   RUE Neurovascular Assessment   Capillary Refill Less than/equal to 3 seconds   Color Appropriate for ethnicity; Ecchymosis   Temperature Warm   R Radial Pulse +2   LUE Neurovascular Assessment   Capillary Refill Less than/equal to 3 seconds   Color Appropriate for Assessed: 06/18/20 1431   Primary Wound Type: Venous Ulcer  Wound Description (Comments): #1, left lower leg cluster, venous, partial thickness, onset 6/1/2020   Dressing Status Clean;Dry; Intact   Dressing/Treatment Ace wrap   Drainage Amount None   Odor None   Wound 06/18/20 #2, right lower leg cluster, venous, full thickness, onset 6/1/2020   Date First Assessed/Time First Assessed: 06/18/20 1432   Primary Wound Type: Venous Ulcer  Wound Description (Comments): #2, right lower leg cluster, venous, full thickness, onset 6/1/2020   Dressing Status Clean;Dry; Intact   Dressing/Treatment Moisturizing cream   Drainage Amount None   Odor None   Psychosocial   Psychosocial (WDL) WDL   Patient Behaviors Calm; Cooperative

## 2020-09-02 NOTE — CARE COORDINATION
Chart review completed for LOS day # 15-    Per Cardiology on 9/1:  Subjective:  Ms. Jeremy Davison up in room, feeling better, anxious for discharge. Weight down another 2 lbs  PLAN:  1. Likely intravascularly dry given increased HR and creatinine. Volume status at baseline. 2. Hold BB for now, reconsider in follow up  3. No ACEi/ARB/ARNI or BB due to hypotension and CKD  4. PA pressures  Down (PAD 26)  5. Suggest decrease torsemide to 60 mg once daily (received 50 mg this am- hold this pm dose and start 60 mg daily tomorrow)  6. Continue spironolactone and potassium at current doses  7. Okay for discharge from cardiac perspective. Will review cardiac medications on discharge medication reconciliation form. Patient has follow up appointment with me in 1 week. To have renal panel prior to appointment. Per Vascular:    PROCEDURES PERFORMED:  1. Selective left subclavian angiogram.  2.  Placement of catheter in the subclavian artery. 3.  Angioplasty and stenting of the subclavian artery.   4.  Left upper extremity angiogram    [facility] following

## 2020-09-02 NOTE — PROGRESS NOTES
FIT (fecal immunochemical test), Pulmonary nodule, and PVD (peripheral vascular disease) ([facility] [address] >>For CHF and Comorbidity documentation on Education Time and Topics, please see Education Tab    Progressive Mobility Assessment:  What is this patient's Current Level of Mobility?: Ambulatory-Up Ad Jasmin  How was this patient Mobilized today?: Edge of Bed, Up to Chair, Bedside Commode,  Up to Toilet/Shower, Up in Room, Up in [place], Unable to Mobilize and Patient Refuses to [address]? Nurse, PCA, PT, OT and Self                 Level of Difficulty/Assistance: Independent     Pt sitting in bed at this time on room air. Pt denies shortness of breath. Pt without lower extremity edema.      Patient and/or Family's stated Goal of Care this Admission: reduce shortness of breath, increase activity tolerance, better understand heart failure and disease management, be more comfortable and reduce lower extremity edema prior to discharge        :

## 2020-09-02 NOTE — PROGRESS NOTES
Danyel   Daily Progress Note    Admit Date:  8/18/2020  HPI:   Presented with right sided abdominal pain radiating to right chest, associated with shortness of breath, weight gain and swelling. Troponin and BNP elevated. Hx of CAD s/p PCI to LAD and LCx 2018, CABG X1 (LIMA to LAD,Jan 2020 for restenosis of LAD and LCx and significant dz in RCA). Hx of ICM, sCHF s/p CardioMEMs implant (2/2019), MR, PAD, CKD, DM, COPD, leg wounds. She was recently admitted following left renal artery angioplasty and stenting complicated by perinephric hematoma requiring coil embolization of bleeding vessel. Stress test abnormal.  [facility] with left subclavian stenosis affecting flow to LIMA to LAD. Started on low dose dobutamine to aide in diuresis and renal perfusion. S/p L subclavian angio/ stenting 9/1/20 successful with normal antegrade flow to LIMA. Subjective:  Ms. Dang Bur up in room, feeling better, anxious for discharge. Weight down another 2 lbs. Objective:   /71   Pulse 91   Temp 97.9 °F (36.6 °C) (Oral)   Resp 18   Ht 5' 4.5\" (1.638 m)   Wt 225 lb 6.4 oz (102.2 kg)   LMP 10/24/2012   SpO2 97%   BMI 38.09 kg/m²       Intake/Output Summary (Last 24 hours) at 9/2/2020 0914  Last data filed at 9/2/2020 0834  Gross per 24 hour   Intake 782.9 ml   Output 2350 ml   Net -1567.1 ml     Wt Readings from Last 3 Encounters:   09/02/20 225 lb 6.4 oz (102.2 kg)   08/18/20 250 lb (113.4 kg)   08/12/20 255 lb 1.6 oz (115.7 kg)         ASSESSMENT:   1. CHF, acute on chronic combined and right heart failure - weight down to 225lbs, likely at baseline fluid status  2. Cardiomyopathy, ischemic - EF 25% by limited echo, unchanged; not on ACEi/ARB/ARNI due to hypotension and CKD, dobutamine stopped this am  3. CAD s/p PCI's and CABG - stress test with lateral reversible ischemia, + left subclavian steal to LIMA, on high dose statin, DAPT, no chest pain   4.  Left subclavian stenosis - 95-99%, per Dr. Erlin Cast, s/p angio/ stenting 9/1/20 successful  5. Hypotension - improved, midodrine 10 mg tid  6. HLD - on high dose statin  7. CKD3 - creatinine up slightly, likely overdiuresed  8. Hyponatremia - stable to improved  9. Hypokalemia -improved  10. DM - stable      PLAN:  1. Likely intravascularly dry given increased HR and creatinine. Volume status at baseline. 2. Hold BB for now, reconsider in follow up  3. No ACEi/ARB/ARNI or BB due to hypotension and CKD  4. PA pressures  Down (PAD 26)  5. Suggest decrease torsemide to 60 mg once daily (received 50 mg this am- hold this pm dose and start 60 mg daily tomorrow)  6. Continue spironolactone and potassium at current doses  7. Okay for discharge from cardiac perspective. Will review cardiac medications on discharge medication reconciliation form. Patient has follow up appointment with me in 1 week. To have renal panel prior to appointment.      TRAY Braun - CNP, 9/2/2020, 9:14 AM  [facility], [place]   [phone]      Telemetry:     NYHA: IV    Physical Exam:  General:  Awake, alert, NAD  Skin:  Warm and dry  Neck:  JVP 8 cm  Chest:  Clear to auscultation   Cardiovascular:  RRR, normal O9X1, soft systolic murmur, no g/r  Abdomen:  Soft, + bowel sounds, non-tender  Extremities:  1+ BLE edema, soft, + wrinkling    Medications:    midodrine  10 mg Oral TID     guaiFENesin  600 mg Oral BID    polyethylene glycol  17 g Oral Daily    potassium chloride  40 mEq Oral TID    torsemide  50 mg Oral BID    sodium chloride flush  10 mL Intravenous 2 times per day    docusate sodium  100 mg Oral Daily    ARIPiprazole  10 mg Oral Daily    aspirin EC  81 mg Oral Daily    benztropine  1 mg Oral Nightly    buprenorphine-naloxone  1 Film Sublingual BID    clopidogrel  75 mg Oral Daily    ferrous sulfate  325 mg Oral BID WC    lamoTRIgine  200 mg Oral BID    magnesium oxide  400 mg Oral Daily    pantoprazole  40 mg Oral BID    spironolactone  50 mg Oral Daily    tiotropium  2 puff Inhalation Daily    insulin lispro  0-12 Units Subcutaneous TID WC    insulin lispro  0-6 Units Subcutaneous Nightly    busPIRone  30 mg Oral BID    insulin glargine  18 Units Subcutaneous Nightly    atorvastatin  80 mg Oral Nightly    sodium chloride flush  10 mL Intravenous 2 times per day      dextrose         Lab Data: Lab results independently reviewed by myself 8/20/20   CBC:   Recent Labs     08/31/20  0417   WBC 5.9   HGB 10.6*        BMP:    Recent Labs     08/31/20  0417 09/01/20  0515 09/02/20  0550   * 130* 133*   K 3.8 4.0 3.9   CO2 26 26 24   BUN 41* 41* 39*   CREATININE 2.1* 2.1* 2.3*     INR:    No results for input(s): INR in the last 72 hours. BNP:    Recent Labs     09/02/20  0550   PROBNP 3,544*     Cardiac Enzymes:   No results for input(s): TROPONINI in the last 72 hours. Lipids:   Lab Results   Component Value Date    TRIG 78 01/27/2020    TRIG 140 01/11/2020    HDL 21 01/27/2020    HDL 27 01/11/2020    LDLCALC 21 01/27/2020    LDLCALC 103 01/11/2020    LDLDIRECT 187 08/01/2017       Cardiac Imaging:   CARDIAC CATH 8/27/20:   FINDINGS      HEMODYNAMICS   CHAMBER PRESSURE SATURATION   RA  15  54 %   RV  62/16     PA  69/20  51 %   PW  19 with V waves to 25     AORTA  118/63  94 %      HERMILA CARDIAC OUTPUT  5.4 L/min   SVR  1075 L/min   PVR  343 L/min      Left subclavian angiogram shows proximal 95 to 99% stenosis.   There appears to be retrograde filling of the left subclavian artery by way of the LIMA and there appears to be coronary steal.  The vertebral arteries not well visualized.     Left heart catheterization   LVEDP  22   GRADIENT ACROSS AORTIC VALVE  none      CORONARY ARTERIES   LM  Proximal less than 10% stenosis, mid 10 to 20% stenosis, distal 70% stenosis.     Overall similar appearance to prior angiograms         LAD  Ostial 70% stenosis, the LAD is extensively stented in the proximal and mid segments, the stents appear to be widely patent with 20% in-stent restenosis, distal vessel has less than 10% stenosis. There is retrograde filling into the LIMA to LAD graft that fills the left subclavian artery.     D1 is a small vessel with ostial 90% stenosis.     Overall similar appearance to prior angiograms         LCX  Circumflex is stented throughout the proximal and mid and distal segments into OM 1 which is a large vessel. Stents are widely patent with 10 to 20% in-stent restenosis. The stents jailed the AV groove circumflex and at the mid AV groove circumflex, there is a 70 to 80% stenosis.           RCA Dominant, medium vessel, heavily calcified, proximal-mid 60 to 70% stenosis, distal less than 10% stenosis, overall similar angiographically as compared to prior angiograms.      BYPASS GRAFTS   LIMA-LAD  Visualized nonselectively through retrograde filling from the native LAD, it is not visualized with antegrade flow from the left subclavian artery injection it appears to be widely patent with less than 10% [id number] stenosis.      CONCLUSIONS:      Elevated filling pressures, continue diuresis  CardioMEMS had good correlation with the right heart catheterization numbers.   Severe pulmonary hypertension  Patent LIMA to LAD graft however there appears to be coronary steal phenomenon due to severe left subclavian stenosis  We will consider left subclavian intervention  Will obtain follow-up vascular ultrasound to assess this  We will consult with CT surgery regarding therapeutic options         STRESS MPI 8/22/20:    Summary    Calculated LVEF is not reliable on study which is technically difficult, estimate of 51% is likely an overestimate     Mid-distal anteroseptal/apical akinesis    Large fixed defects in anteroseptal/apical walls    Mild to moderate reversibility in lateral walls consistent with ischemia    Overall, this would be considered an abnormal high risk study       LIMITED ECHO 8/19/20: stenosis, prior to patent proximal to mid stents    RCA: large, dominant vessel with long 60% proximal to mid stenosis    LVEDP: 4 mmHG  LVG not done due to CKD.     Impression/Recommendations:  Diabetic with previous LAD and LCx stenting in 2018 returns with unstable angina, in stent restenosis and Left Main bifurcation disease. Recommend CTS evaluation inpatient to discuss surgical revascularization option. Hold Clopidogrel. ECHO 4/3/19:   Aquilino Medici systolic function is mildly reduced with EF estimated at 40-45%.   There is severe HK of the apex and apical-septal segment.   Normal left ventricular diastolic filling pressure.   Mild mitral regurgitation.   Systolic pulmonary artery pressure (SPAP) estimated at 32mmHg (RA pressure 3mmHg). [facility] 3/26/18 [employer] promus premier [id number] CCx and [email or web address] CCx.

## 2020-09-02 NOTE — CARE COORDINATION
CASE MANAGEMENT DISCHARGE SUMMARY      Discharge to: Home with [address] completed: [facility] Exemption Notification (HENS) completed: n/a    New Durable Medical Equipment ordered/agency: None    Transportation:    Family/car: on arrival      Confirmed discharge plan with: Norwood Severin RN, Dr. Sukhdeep Thomas     Patient: yes       Facility/Agency, name:  [facility]AVS faxed to [phone] [facility]    RN, name: Vinayak Hernandez    Note: Discharging nurse to complete CARMEN, reconcile AVS, and place final copy with patient's discharge packet. RN to ensure that written prescriptions for  Level II medications are sent with patient to the facility as per protocol.

## 2020-09-03 ENCOUNTER — FOLLOWUP TELEPHONE ENCOUNTER (OUTPATIENT)
Dept: TELEMETRY | Age: 55
End: 2020-09-03

## 2020-09-03 ENCOUNTER — HOSPITAL ENCOUNTER (OUTPATIENT)
Dept: WOUND CARE | Age: 55
Discharge: HOME | End: 2020-09-03
Payer: COMMERCIAL

## 2020-09-03 NOTE — TELEPHONE ENCOUNTER
[facility] Care transition included reason for hospitalization, procedures performed during hospitalization, services provided during hospitalization, discharge medications, and follow-up treatment and services needed.     Sharla Keita HF BSN-RN  Heart Failure Navigator  [address] [place] [address]  [phone]

## 2020-09-04 ENCOUNTER — TELEPHONE (OUTPATIENT)
Dept: CARDIOLOGY CLINIC | Age: 55
End: 2020-09-04

## 2020-09-05 ENCOUNTER — HOSPITAL ENCOUNTER (OUTPATIENT)
Age: 55
Discharge: HOME | End: 2020-09-05
Payer: COMMERCIAL

## 2020-09-05 LAB
A/G RATIO: 1 (ref 1.1–2.2)
ALBUMIN SERPL-MCNC: 4 G/DL (ref 3.4–5)
ALP BLD-CCNC: 242 U/L (ref 40–129)
ALT SERPL-CCNC: 9 U/L (ref 10–40)
ANION GAP SERPL CALCULATED.3IONS-SCNC: 15 MMOL/L (ref 3–16)
AST SERPL-CCNC: 16 U/L (ref 15–37)
BILIRUB SERPL-MCNC: 3.3 MG/DL (ref 0–1)
BUN BLDV-MCNC: 42 MG/DL (ref 7–20)
CALCIUM SERPL-MCNC: 9.8 MG/DL (ref 8.3–10.6)
CHLORIDE BLD-SCNC: 92 MMOL/L (ref 99–110)
CO2: 23 MMOL/L (ref 21–32)
CREAT SERPL-MCNC: 2.3 MG/DL (ref 0.6–1.1)
GFR AFRICAN AMERICAN: 26
GFR NON-AFRICAN AMERICAN: 22
GLOBULIN: 4.1 G/DL
GLUCOSE BLD-MCNC: 137 MG/DL (ref 70–99)
POTASSIUM SERPL-SCNC: 3.5 MMOL/L (ref 3.5–5.1)
PRO-BNP: 5014 PG/ML (ref 0–124)
SODIUM BLD-SCNC: 130 MMOL/L (ref 136–145)
TOTAL PROTEIN: 8.1 G/DL (ref 6.4–8.2)

## 2020-09-05 PROCEDURE — 36415 COLL VENOUS BLD VENIPUNCTURE: CPT

## 2020-09-05 PROCEDURE — 83880 ASSAY OF NATRIURETIC PEPTIDE: CPT

## 2020-09-05 PROCEDURE — 80053 COMPREHEN METABOLIC PANEL: CPT

## 2020-09-08 ENCOUNTER — TELEPHONE (OUTPATIENT)
Dept: CARDIOLOGY CLINIC | Age: 55
End: 2020-09-08

## 2020-09-09 ENCOUNTER — OFFICE VISIT (OUTPATIENT)
Dept: CARDIOLOGY CLINIC | Age: 55
End: 2020-09-09
Payer: COMMERCIAL

## 2020-09-09 VITALS
BODY MASS INDEX: 37.94 KG/M2 | SYSTOLIC BLOOD PRESSURE: 132 MMHG | OXYGEN SATURATION: 99 % | DIASTOLIC BLOOD PRESSURE: 70 MMHG | HEART RATE: 87 BPM | WEIGHT: 224.5 LBS

## 2020-09-09 LAB
ALBUMIN SERPL-MCNC: 4.2 G/DL
ALP BLD-CCNC: 224 U/L
ALT SERPL-CCNC: 8 U/L
ANION GAP SERPL CALCULATED.3IONS-SCNC: ABNORMAL MMOL/L
AST SERPL-CCNC: 13 U/L
BILIRUB SERPL-MCNC: 2.9 MG/DL (ref 0.1–1.4)
BUN BLDV-MCNC: 49 MG/DL
CALCIUM SERPL-MCNC: 9.6 MG/DL
CHLORIDE BLD-SCNC: 90 MMOL/L
CO2: 22 MMOL/L
CREAT SERPL-MCNC: 2.23 MG/DL
GFR CALCULATED: ABNORMAL
GLUCOSE BLD-MCNC: 184 MG/DL
POTASSIUM SERPL-SCNC: 3.2 MMOL/L
SODIUM BLD-SCNC: 136 MMOL/L
TOTAL PROTEIN: 7.7

## 2020-09-09 PROCEDURE — G8417 CALC BMI ABV UP PARAM F/U: HCPCS | Performed by: NURSE PRACTITIONER

## 2020-09-09 PROCEDURE — 1111F DSCHRG MED/CURRENT MED MERGE: CPT | Performed by: NURSE PRACTITIONER

## 2020-09-09 PROCEDURE — 4004F PT TOBACCO SCREEN RCVD TLK: CPT | Performed by: NURSE PRACTITIONER

## 2020-09-09 PROCEDURE — 99214 OFFICE O/P EST MOD 30 MIN: CPT | Performed by: NURSE PRACTITIONER

## 2020-09-09 PROCEDURE — G8427 DOCREV CUR MEDS BY ELIG CLIN: HCPCS | Performed by: NURSE PRACTITIONER

## 2020-09-09 PROCEDURE — 3017F COLORECTAL CA SCREEN DOC REV: CPT | Performed by: NURSE PRACTITIONER

## 2020-09-09 PROCEDURE — 3051F HG A1C>EQUAL 7.0%<8.0%: CPT | Performed by: NURSE PRACTITIONER

## 2020-09-09 PROCEDURE — 2022F DILAT RTA XM EVC RTNOPTHY: CPT | Performed by: NURSE PRACTITIONER

## 2020-09-09 NOTE — PROGRESS NOTES
[facility]   Cardiac Evaluation    Primary Care Doctor:  Deena Peter    Chief Complaint   Patient presents with    Follow-Up from Hospital        History of Present Illness:   I had the pleasure of seeing Jer Eaton in follow up for recent hospitalization. Jer Eaton presented with right sided abdominal pain radiating to right chest, associated with shortness of breath, weight gain and swelling. Troponin and BNP elevated. She has hx of CAD s/p PCI to LAD and LCx , CABG X1 (LIMA to LAD,2020 for restenosis of LAD and LCx and significant dz in RCA). Hx of ICM, sCHF s/p CardioMEMs implant (2019), MR, PAD, CKD, DM, COPD, leg wounds.     She was recently admitted following left renal artery angioplasty and stenting complicated by perinephric hematoma requiring coil embolization of bleeding vessel.    Stress test abnormal.  [facility] with left subclavian stenosis affecting flow to LIMA to LAD. Started on low dose dobutamine to aide in diuresis and renal perfusion. S/p L subclavian angio/ stenting 20 successful with normal antegrade flow to LIMA. Gradually weaned off IV Lasix and dobutamine. Discharged to home on 20. Blood work on 20 with increase in BUN and BNP, drop in Na. She fell getting out of car when going home last week -  parked too close to grass and she slipped on damp grass. Did not hit head. Injured right arm and shoulder- has limited motion to right shoulder. ++ bruising to right breast.      Weight at home staying 224 lbs. Has not been transmitting since cannot use arm to set up. Currently living with daughter and Vitoasa Apgar, 'home'. Her daughter is working 2 jobs. Blood sugars staying 130-140's. She is keeping fluid intake around 48 oz but difficult due to dry mouth. Urine is more yellow, darker than usual.  In hospital was on 2 liter fluid restriction. Legs healing well. To see wound clinic tomorrow.   Had repeat blood work at PCP office today. Abdomen remains soft, not distended or painful. Shawn Paz describes symptoms including dyspnea, orthopnea, fatigue, edema but denies chest pain, palpitations, PND, early saiety, syncope. Past Medical History:   has a past medical history of Acute kidney injury ([facility] Acute on chronic congestive heart failure ([address] Alcohol dependence ([address] Bipolar 1 disorder ([address] CAD (coronary artery disease), Cardiomyopathy ([address] Cellulitis, CHF (congestive heart failure) ([address] COPD (chronic obstructive pulmonary disease) ([facility] [address] Diabetic ulcer of left foot associated with type 2 diabetes mellitus ([facility] [address] Diabetic ulcer of toe of right foot associated with type 2 diabetes mellitus ([facility] [address] GERD (gastroesophageal reflux disease), High cholesterol, HTN (hypertension), Kidney disease, chronic, stage II (mild, EGFR 60+ ml/min), MI (myocardial infarction) ([facility] [address] Positive FIT (fecal immunochemical test), Pulmonary nodule, and PVD (peripheral vascular disease) ([facility] [address] [facility] Surgical History:   has a past surgical history that includes Tonsillectomy; Cholecystectomy; tumor excision; Upper gastrointestinal endoscopy (4/25/2013); Upper gastrointestinal endoscopy (12/10/2015); Upper gastrointestinal endoscopy (01/06/2017); Arterial bypass surgry (Left, 01/06/2016); Cardiac catheterization (03/27/2018); Coronary angioplasty with stent (03/16/2018); Rotator cuff repair (Left, 04/22/2016); Coronary angioplasty with stent (01/03/2018); Insertable Cardiac Monitor (02/14/2019); femoral bypass (Right, 5/16/2019); transluminal angioplasty (Left, 10/08/2019); Cardiac catheterization (01/20/2020); Coronary artery bypass graft (N/A, 1/27/2020); vascular surgery (Left, 12/08/2015); transluminal angioplasty (Left, 04/29/2020); vascular surgery (Bilateral, 07/07/2020); Coronary angioplasty with stent (10/07/2019); transesophageal echocardiogram (01/26/2020); and Kidney surgery.    Social History:   reports that she has been smoking cigarettes. She has a 15.00 pack-year smoking history. She has never used smokeless tobacco. She reports that she does not drink alcohol or use drugs. Family History:   Family History   Problem Relation Age of Onset    Heart Disease Maternal Grandmother     Cancer Mother         lung and brain cancer    Cancer Maternal Aunt         lung and brain cancer       Home Medications:  Prior to Admission medications    Medication Sig Start Date End Date Taking?  Authorizing Provider   torsemide (DEMADEX) 20 MG tablet Take 3 tablets by mouth daily 9/3/20  Yes TRAY Dobbs CNP   midodrine (PROAMATINE) 10 MG tablet Take 1 tablet by mouth 3 times daily (with meals) 9/2/20  Yes Gissell Villareal MD   insulin aspart (NOVOLOG FLEXPEN) 100 UNIT/ML injection pen Inject 10 Units into the skin 3 times daily (before meals) 8/14/20  Yes TRAY Leach CNP   spironolactone (ALDACTONE) 50 MG tablet Take 1 tablet by mouth daily 8/13/20 9/12/20 Yes Jaxon Mandujano MD   ferrous sulfate (IRON 325) 325 (65 Fe) MG tablet Take 1 tablet by mouth 2 times daily (with meals) 8/12/20 9/11/20 Yes Jaxon Mandujano MD   traZODone (DESYREL) 100 MG tablet Take 100 mg by mouth nightly as needed for Sleep Can take 1-2 tabs nightly   Yes Historical Provider, MD   albuterol sulfate  (90 Base) MCG/ACT inhaler Inhale 2 puffs into the lungs every 6 hours as needed for Wheezing or Shortness of Breath 7/3/20  Yes Femi Alvarado MD   Insulin Degludec (TRESIBA FLEXTOUCH) 100 UNIT/ML SOPN Inject 30 Units into the skin nightly 5/27/20  Yes TRAY Leach CNP   insulin lispro, 1 Unit Dial, 100 UNIT/ML SOPN Inject 10 Units into the skin 3 times daily 5/20/20  Yes TRAY Leach CNP   atorvastatin (LIPITOR) 80 MG tablet Take 1 tablet by mouth nightly 2/28/20  Yes TRAY Dobbs CNP   tiotropium (SPIRIVA RESPIMAT) 2.5 MCG/ACT AERS inhaler INHALE 2 PUFFS INTO THE LUNGS DAILY 2/25/20  Yes Annie Saenz MD   buprenorphine-naloxone (SUBOXONE) 8-2 MG FILM SL film Place 1 Film under the tongue 2 times daily. Yes Historical Provider, MD   benztropine (COGENTIN) 1 MG tablet Take 1 mg by mouth nightly  12/13/19  Yes Historical Provider, MD   blood glucose test strips (EXACTECH TEST) strip 6 times daily. 12/19/19  Yes TRAY Lawson CNP   Insulin Pen Needle (B-D ULTRAFINE III SHORT PEN) 31G X 8 MM MISC Five shots a day 12/3/19  Yes TRAY Lawson CNP   INSULIN SYRINGE .5CC/29G 29G X 1/2\" 0.5 ML MISC 1 each by Does not apply route daily 12/3/19  Yes TRAY Lawson CNP   Liraglutide (VICTOZA) 18 MG/3ML SOPN SC injection Inject 1.2 mg into the skin daily 10/29/19  Yes TRAY Lawson CNP   pantoprazole (PROTONIX) 40 MG tablet Take 1 tablet by mouth 2 times daily 10/1/19  Yes Leidy Shelby MD   clopidogrel (PLAVIX) 75 MG tablet TAKE 1 TABLET BY MOUTH EVERY DAY 9/3/19  Yes TRAY Almeida CNP   magnesium oxide (MAG-OX) 400 (240 Mg) MG tablet TAKE 1 TABLET ONCE DAILY 5/1/19  Yes Oanh Willoughby MD   busPIRone (BUSPAR) 30 MG tablet Take 30 mg by mouth 2 times daily  4/2/18  Yes Historical Provider, MD   aspirin EC 81 MG EC tablet Take 1 tablet by mouth daily 1/8/16  Yes Juno Larsen MD   ARIPiprazole (ABILIFY) 10 MG tablet Take 10 mg by mouth daily    Yes Historical Provider, MD   lamoTRIgine (LAMICTAL) 150 MG tablet Take 200 mg by mouth 2 times daily    Yes Historical Provider, MD   potassium chloride (KLOR-CON M) 20 MEQ extended release tablet Take 2 tablets by mouth 3 times daily 9/2/20   TRAY Almeida CNP   DOXEPIN HCL PO Take 1 capsule by mouth nightly as needed Patient unsure of exact dosage    Historical Provider, MD        Allergies:  Lisinopril     Review of Systems:   · Constitutional: there has been no unanticipated weight loss. · Eyes: No vision changes  · ENT: No Headaches, no nasal congestion. No mouth sores or sore throat.   · Cardiovascular: Reviewed in HPI  · Respiratory: No cough or wheezing, no sputum production. · Gastrointestinal: No abdominal pain, no constipation or diarrhea  · Genitourinary: No dysuria, trouble voiding, or hematuria. · Musculoskeletal:  No weakness or joint complaints. · Integumentary: No rash or pruritis. · Neurological: No numbness or tingling. No weakness. No tremor. · Psychiatric: No anxiety, no depression. · Endocrine:  No excessive thirst or urination. · Hematologic/Lymphatic: No abnormal bruising or bleeding, blood clots or swollen lymph nodes. Physical Examination:    Vitals:    09/09/20 1341   BP: 110/76   Pulse: 87   SpO2: 99%   Weight: 224 lb 8 oz (101.8 kg)        Constitutional and General Appearance: Warm and dry, no apparent distress, normal coloration  HEENT:  Normocephalic, atraumatic  Respiratory:  · Normal excursion and expansion without use of accessory muscles  · Resp Auscultation: Normal breath sounds without dullness  Cardiovascular:  · The apical impulses not displaced  · Heart tones are crisp and normal  · JVP 10 cm H2O  · Regular rate and rhythm, normal X7T5, soft systolic murmur, no g/r  · Peripheral pulses are symmetrical and full  · There is no clubbing, cyanosis of the extremities.   · 1+ BLE edema  · Pedal Pulses: 2+ and equal     Abdomen:  · No masses or tenderness, soft anterior and flank area's  · Liver/Spleen: No Abnormalities Noted  Neurological/Psychiatric:  · Alert and oriented in all spheres  · Moves all extremities well excepting right arm which she is holding but able to move in all directions when instructed  · Exhibits normal gait balance and coordination  · No abnormalities of mood, affect, memory, mentation, or behavior are noted    Lab Data:  CBC:   Lab Results   Component Value Date    WBC 5.9 08/31/2020    WBC 5.8 08/28/2020    WBC 7.9 08/25/2020    RBC 3.96 08/31/2020    RBC 3.64 08/28/2020    RBC 3.68 08/25/2020    HGB 10.6 08/31/2020    HGB 9.8 08/28/2020    HGB 9.7 08/25/2020    HCT 33.3 08/31/2020    HCT 30.2 08/28/2020    HCT 30.5 08/25/2020    MCV 84.1 08/31/2020    MCV 83.0 08/28/2020    MCV 82.8 08/25/2020    RDW 25.5 08/31/2020    RDW 26.5 08/28/2020    RDW 25.3 08/25/2020     08/31/2020     08/28/2020     08/25/2020     BMP:  Lab Results   Component Value Date     09/05/2020     09/02/2020     09/01/2020    K 3.5 09/05/2020    K 3.9 09/02/2020    K 4.0 09/01/2020    K 2.8 08/18/2020    K 3.5 08/05/2020    K 3.3 08/05/2020    CL 92 09/05/2020    CL 92 09/02/2020    CL 88 09/01/2020    CO2 23 09/05/2020    CO2 24 09/02/2020    CO2 26 09/01/2020    PHOS 3.0 09/02/2020    PHOS 3.1 09/01/2020    PHOS 4.0 08/31/2020    BUN 42 09/05/2020    BUN 39 09/02/2020    BUN 41 09/01/2020    CREATININE 2.3 09/05/2020    CREATININE 2.3 09/02/2020    CREATININE 2.1 09/01/2020     BNP:   Lab Results   Component Value Date    PROBNP 5,014 09/05/2020    PROBNP 3,544 09/02/2020    PROBNP 4,433 08/28/2020     Troponin: No components found for: TROPONIN  Lipids:   Lab Results   Component Value Date    TRIG 78 01/27/2020    TRIG 140 01/11/2020    HDL 21 01/27/2020    HDL 27 01/11/2020    [address] 21 01/27/2020    LDLCALC 103 01/11/2020    LDLDIRECT 187 08/01/2017     Cardiac Imaging:  VASCULAR PROCEDURE:   DATE OF PROCEDURE:  09/01/2020   PREOPERATIVE DIAGNOSES:  Left subclavian artery stenosis with coronary steal syndrome.   POSTOPERATIVE DIAGNOSES:  Left subclavian artery stenosis with coronary steal syndrome.   PROCEDURES PERFORMED:  1. Selective left subclavian angiogram.  2.  Placement of catheter in the subclavian artery. 3.  Angioplasty and stenting of the subclavian artery.   4.  Left upper extremity angiogram.       CARDIAC CATH 8/27/20:   FINDINGS   HEMODYNAMICS   CHAMBER PRESSURE SATURATION   RA  15  54 %   RV  62/16     PA  69/20  51 %   PW  19 with V waves to 25     AORTA  118/63  94 %      HERMILA CARDIAC OUTPUT  5.4 L/min   SVR  1075 L/min   PVR  343 L/min      Left subclavian angiogram intervention  Will obtain follow-up vascular ultrasound to assess this  We will consult with CT surgery regarding therapeutic options       STRESS MPI 8/22/20:    Summary    Calculated LVEF is not reliable on study which is technically difficult, estimate of 51% is likely an overestimate     Mid-distal anteroseptal/apical akinesis    Large fixed defects in anteroseptal/apical walls    Mild to moderate reversibility in lateral walls consistent with ischemia    Overall, this would be considered an abnormal high risk study     URI 1/24/20:    Summary   Ejection fraction is visually estimated at 35-40%. Moderate to severe mitral regurgitation with multiple jets visualized. ERO estimated at 0.29 cm2. Mild tricuspid regurgitation. The left atrial appendage shows evidence of thrombus formation and low flow velocities. Moderate amount of plaque in the aorta    Surgery: 1/27/20 Urgent coronary artery bypass grafting surgery x1 with pedicled left internal mammary artery to the LAD. Cardiopulmonary bypass with on-pump beating-heart technique, no cross-clamp. Transesophageal echo. Epiaortic ultrasound. [place] catheter placement. Doppler verification of grafts. Bilateral five-level intercostal nerve block with Exparel. Platelet gel application. Sternal plating. Vas-Cath placement.      CARDIAC CATH 1/20/2020:   Left Heart Cath  Dominance : Right     LM: 70-80% short distal stenosis     LAD: 70-80% short ostial stenosis, extending into takeoff of high first diagonal; large proximal to mid stented segment patent with jailed second diagonal  (80% ostial D2) and 70% in stent restenosis of most distal stent; distal to near apical LAD with minimal disease     LCx: 70-80% short ostial stenosis, prior to patent proximal to mid stents      RCA: large, dominant vessel with long 60% proximal to mid stenosis      LVEDP: 4 mmHG  LVG not done due to CKD.     Impression/Recommendations:  Diabetic with previous LAD and LCx stenting in 2018 returns with unstable angina, in stent restenosis and Left Main bifurcation disease. Recommend CTS evaluation inpatient to discuss surgical revascularization option. Hold Clopidogrel. CT Abd/ Pelvis 12/25/19:   FINDINGS: Lower Chest: Lung bases are grossly clear. Small hiatal hernia. Left lower lobe calcified granuloma. Organs: Noncontrast appearance of the liver, spleen, adrenal glands, and pancreas unremarkable. Pneumobilia identified within liver may be related to previous cholecystectomy or other biliary procedure. Kidneys symmetric in size. Left renal hilar calcifications are favored to be vascular. GI/Bowel: Moderate to large amount retained stool throughout the colon. Appendix is unremarkable. No evidence of bowel obstruction. Pelvis: Bladder is mildly distended. Uterus unremarkable. No adnexal mass. Peritoneum/Retroperitoneum: Moderate severe aortic vascular calcifications. Aorta is nonaneurysmal.  No free air or free fluid. Bones/Soft Tissues: Severe multilevel facet arthropathy     BLE ZACH's 4/18/19:   1. There is severe arterial insufficiency at rest involving the right lower    extremity. There occlusive disease of the right proximal superficial femoral    (noted stent) and mid posterior tibial arteries. There is a 50-74% stenosis    at 4the right deep femoral artery with evidence of inflow disease.    2. There is severe arterial insufficiency at rest involving the left lower    extremity. There is occlusive disease of the left proximal superficial    femoral artery (noted stent).  There is a 30-49% stenosis at the left deep    femoral artery with evidence of inflow disease.         ECHO 4/3/19:   Karnea Swann systolic function is mildly reduced with EF estimated at 40-45%.   There is severe HK of the apex and apical-septal segment.   Normal left ventricular diastolic filling pressure.   Mild mitral regurgitation.   Systolic pulmonary artery pressure (SPAP) estimated at 32mmHg (RA pressure 3mmHg). Arterial doppler studies 8/2/18:   Aravind Fatima is no arterial insufficiency at rest involving the lower extremities    bilaterally, however, there is evidence to suggest calf vessel disease    bilaterally. [facility] 3/26/18 [employer] promus premier [id number] CCx and [id number] CCx. Echo: 5/23/18:    Ejection fraction is visually estimated to be 30-35 %. There is akinesis of the apex and inferior walls. Left ventricular size is mildly increased. Moderate mitral regurgitation. Moderate amount plaque is noted in the aorta. The left atrium is mildly dilated. 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 A bubble study was performed and fails to show evidence of shunting. Procedure performed: Transesophageal echocardiogram 5/25/18:    Findings:  Reduced left ventricular function with ejection fraction estimated at about 35% with apical and inferior akinesis    The cardiac valves show moderate mitral regurgitation  There is no evidence for an intracardiac shunt or intracardiac thrombus. Cardiac cath 5/25/18:   Procedure Performed:  1. Coronary angiogram  2. Left heart cath  3. Left ventriculogram  4. Right heart cath   Findings:  1. Patent LAD and CIRC stents              ~mild CAD  2. Reduced LVEF 30% with anterior and apical akinesis  3. Moderate pulmonary hyperension   Conclusion/plan of care:  1. Medical management    Assessment:    1. Chronic systolic heart failure ([address]    2. Ischemic cardiomyopathy    3. Coronary artery disease involving native coronary artery of native heart without angina pectoris    4. S/P CABG x 1    5. Mitral valve insufficiency, unspecified etiology    6. CKD (chronic kidney disease) stage 3, GFR 30-59 ml/min (HCC)    7. PVD (peripheral vascular disease) with claudication ([address]    8. Type 2 diabetes mellitus with stage 3 chronic kidney disease, with long-term current use of insulin ([address]    9. Mixed hyperlipidemia    10.  CLINT (obstructive

## 2020-09-09 NOTE — PATIENT INSTRUCTIONS
1. Continue current heart medicines and diuretics  2. Will call and get results from PCP from today  3. Set up and transmit CardioMEMS  4. Will plan to repeat blood work in 2 weeks unless needed before based on todays results  5.  Follow up with me in 1 month

## 2020-09-10 ENCOUNTER — HOSPITAL ENCOUNTER (OUTPATIENT)
Dept: WOUND CARE | Age: 55
Discharge: HOME | End: 2020-09-10
Payer: COMMERCIAL

## 2020-09-11 ENCOUNTER — HOSPITAL ENCOUNTER (OUTPATIENT)
Dept: CT IMAGING | Age: 55
Discharge: HOME | End: 2020-09-11
Payer: COMMERCIAL

## 2020-09-11 ENCOUNTER — TELEPHONE (OUTPATIENT)
Dept: CARDIOLOGY CLINIC | Age: 55
End: 2020-09-11

## 2020-09-11 ENCOUNTER — PROCEDURE VISIT (OUTPATIENT)
Dept: CARDIOLOGY CLINIC | Age: 55
End: 2020-09-11
Payer: COMMERCIAL

## 2020-09-11 PROCEDURE — 93264 REM MNTR WRLS P-ART PRS SNR: CPT | Performed by: NURSE PRACTITIONER

## 2020-09-11 PROCEDURE — 71250 CT THORAX DX C-: CPT

## 2020-09-11 NOTE — TELEPHONE ENCOUNTER
Created telephone encounter. Spoke with Namrata relayed message per [employer] regarding labs. Pt verbalized understanding.

## 2020-09-11 NOTE — TELEPHONE ENCOUNTER
----- Message from Tish Eisenmenger, APRN - CNP sent at 9/11/2020  2:04 PM EDT -----  Kidney panel overall better however potassium low. Continue the potassium 40 mEQ 3 times daily. Repeat blood work as planned in 2 weeks (she has standing order).    Thank you, Sarahi Nieves

## 2020-09-17 ENCOUNTER — HOSPITAL ENCOUNTER (OUTPATIENT)
Dept: WOUND CARE | Age: 55
Discharge: HOME | End: 2020-09-17
Payer: COMMERCIAL

## 2020-09-22 ENCOUNTER — OFFICE VISIT (OUTPATIENT)
Dept: ENDOCRINOLOGY | Age: 55
End: 2020-09-22
Payer: COMMERCIAL

## 2020-09-22 PROCEDURE — 1111F DSCHRG MED/CURRENT MED MERGE: CPT | Performed by: NURSE PRACTITIONER

## 2020-09-22 PROCEDURE — G8417 CALC BMI ABV UP PARAM F/U: HCPCS | Performed by: NURSE PRACTITIONER

## 2020-09-22 PROCEDURE — 2022F DILAT RTA XM EVC RTNOPTHY: CPT | Performed by: NURSE PRACTITIONER

## 2020-09-22 PROCEDURE — 3017F COLORECTAL CA SCREEN DOC REV: CPT | Performed by: NURSE PRACTITIONER

## 2020-09-22 PROCEDURE — G8427 DOCREV CUR MEDS BY ELIG CLIN: HCPCS | Performed by: NURSE PRACTITIONER

## 2020-09-22 PROCEDURE — 3051F HG A1C>EQUAL 7.0%<8.0%: CPT | Performed by: NURSE PRACTITIONER

## 2020-09-22 PROCEDURE — 99214 OFFICE O/P EST MOD 30 MIN: CPT | Performed by: NURSE PRACTITIONER

## 2020-09-22 PROCEDURE — 4004F PT TOBACCO SCREEN RCVD TLK: CPT | Performed by: NURSE PRACTITIONER

## 2020-09-22 RX ORDER — PEN NEEDLE, DIABETIC 31 GX5/16"
NEEDLE, DISPOSABLE MISCELLANEOUS
Qty: 200 EACH | Refills: 2 | Status: SHIPPED | OUTPATIENT
Start: 2020-09-22 | End: 2021-01-01 | Stop reason: SDUPTHER

## 2020-09-22 RX ORDER — LIRAGLUTIDE 6 MG/ML
1.2 INJECTION SUBCUTANEOUS DAILY
Qty: 2 PEN | Refills: 3 | Status: ON HOLD | OUTPATIENT
Start: 2020-09-22 | End: 2020-01-01 | Stop reason: HOSPADM

## 2020-09-22 RX ORDER — INSULIN DEGLUDEC INJECTION 100 U/ML
30 INJECTION, SOLUTION SUBCUTANEOUS NIGHTLY
Qty: 10 PEN | Refills: 5 | Status: SHIPPED | OUTPATIENT
Start: 2020-09-22 | End: 2021-01-01 | Stop reason: SDUPTHER

## 2020-09-22 RX ORDER — INSULIN LISPRO 100 [IU]/ML
10 INJECTION, SOLUTION INTRAVENOUS; SUBCUTANEOUS 3 TIMES DAILY
Qty: 3 PEN | Refills: 2 | Status: SHIPPED | OUTPATIENT
Start: 2020-09-22 | End: 2020-01-01

## 2020-09-22 ASSESSMENT — ENCOUNTER SYMPTOMS
EYE PAIN: 0
SHORTNESS OF BREATH: 0
CONSTIPATION: 0
DIARRHEA: 0
NAUSEA: 0
COLOR CHANGE: 0

## 2020-09-22 NOTE — PROGRESS NOTES
Endocrinology  Kellee Proctor, CNP, [address] [facility], [address]  Phone [phone]  Fax [phone]    Evy Gauthier is  being evaluated by a Virtual Visit (video visit) encounter to address concerns as mentioned above. Due to this being a TeleHealth encounter (During DZX- public health emergency), evaluation of the following organ systems was limited: Vitals/Constitutional/EENT/Resp/CV/GI//MS/Neuro/Skin/Heme-Lymph-Imm. Pursuant to the emergency declaration under the [facility], [address] [place] [address] authority and the [employer] Act, this Virtual Visit was conducted with patient's (and/or legal guardian's) consent, to reduce the patient's risk of exposure to COVID-19 and provide necessary medical care. The patient (and/or legal guardian) has also been advised to contact this office for worsening conditions or problems, and seek emergency medical treatment and/or call 911 if deemed necessary. Services were provided through a video synchronous discussion virtually to substitute for in-person clinic visit. Patient and provider were located at their individual homes    Patient was identified via name,  on the video visit      Evy Gauthier is a 62 y.o. female who is a new patient presenting for management of Diabetes Mellitus Type 2.     Last A1C:   Lab Results   Component Value Date    LABA1C 7.2 2020    LABA1C 6.3 2020    LABA1C 5.8 2020     Last BP Readings:  BP Readings from Last 3 Encounters:   20 132/70   20 113/87   20 130/81     Last LDL:   Lab Results   Component Value Date    LDLCALC 21 2020    LDLDIRECT 187 (H) 2017     Aspirin Use: 81 mg    Tobacco/Alcohol History:    Smoking status: Current Every Day Smoker--> Quit at last hospital admission     Packs/day: 0.25     Years: 30.00     Pack years: 7.50     Types: Cigarettes    Smokeless tobacco: Never Used    Tobacco comment: 10 cigs per day    Alcohol use: No     Diabetes:   Britt Escobedo  was diagnosed with diabetes type 2 in : 15 years ago, screened and BG = 400   On insulin: > 3 years   Patient reports that diabetes was generally not well controlled, doing better now   Disease course has been variable    Current microvascular complications include peripheral neuropathy, blurry vision,    Current Macrovascular complications include 3 MIs, 6 stents placed ( last one in 3 years ago) . Has a Cardio MEMS implant Pumonary Artery HTN. . Monitored per cardiology   Patient reports compliance for about 80% of the time and adheres to medication, but usually not to diet and exercise instructions.  Admission for DKA in 6/2019 @ .  And 7/72019 for unresponsive with hyperglycemia of 562   Recent : cardiac catheterization , CABG on 1/27/2020   Other ED visit include for :  recent inpatient CHF  PMH includes  Past Medical History:   Diagnosis Date    Acute kidney injury ([address] 12/25/2019    Acute on chronic congestive heart failure (HCC)     Alcohol dependence ([address] 3/10/2010    Bipolar 1 disorder ([address]     CAD (coronary artery disease)     Cardiomyopathy ([address] 06/29/2020    EF= 25%    Cellulitis 6/3/2020    CH[facility]estive heart failure) [facility]     COPD (chronic obstructive pulmonary disease) ([address]     Diabetic ulcer of left foot associated with type 2 diabetes mellitus ([address] 1/18/2020    Diabetic ulcer of toe of right foot associated with type 2 diabetes mellitus ([address] 1/18/2020    GERD (gastroesophageal reflux disease)     High cholesterol     HTN (hypertension)     Kidney disease, chronic, stage II (mild, EGFR 60+ ml/min)     MI (myocardial infarction) ([address] 10/07/2019    NSTEMI    Positive FIT (fecal immunochemical test) 2/28/2020    Pulmonary nodule     PVD (peripheral vascular disease) ([facility])      Blood glucose trends:  Patient brought log glucometer for download: no  Test BG 4 times a day patient report  BG Range:   No fasting numbers as up through night , snacks through the night  Range: 70 to > HI ---> 100- 150--> 130  Middle of night hypoglycemia  Hypoglycemia awareness and symptoms: sluggish, confused  Completed CGM pro     Component    Latest Ref Rng & Units 10/21/2019   C-Peptide    1.1 - 4.4 ng/mL 8.5 (H)   Glutamic Acid Decarb Ab 0.0 - 5.0 IU/mL < 0.5   INSULIN A    0.0 - 0.4 U/mL < 0.4     Current Medication regimen:   Injects 5 times a day  Tresiba: 45 units--> 45 units--> 45 units  Humalo units AC TID--> ran out of humalog for > 1 week and did not call for refills---> rarely using this--> now 10 AC TID  Victoza: 1.8 mg    Previously patient was started on Tresiba however she did not realize she had to stop the Accolo. Has had some lows in 45s. Other meds tried in past: Metformin 1000 mg BID--> CKD3; Lantus: 35 units BID  GFR 57!--> 31!--> 38!--> 33!--> 22! Current Dietary regimen:   BF :  Skip, skips insulin--> eggs etc   Lunch: may skip, sw, eats out (kareem vazquez); [employer] without bread  Dinner: left over, meats and vegetables  Snacks: sw : turkey etc/deli meat  Beverages: regular soda, approx 88 oz per day, water--> has quit her regular pop  Average carbs per day: has decreased to about 100/ day or less    Microvascular Complications:  · Neuropathy: tingling and numbness  · Retinopathy: no concerns with vision, field of vision  · Nephropathy: no recent MACR    Diabetic Health Maintenance   · Last Eye Exam: > 1 year ago, in 2019  · Last Foot exam:  Dr Corrinne Rudder, DPM  · Has patient seen a dietitian? Yes  · Current Exercise: No structured exercise. · On ACEI or ARB: other antihypertensives used  · On statin: Lipitor 80 mg    Hyperlipidemia: Current complaints include occasional myalgias but otherwise tolerates well.   Lab Results   Component Value Date    CHOL 58 2020    CHOL 158 2020    CHOL 178 10/21/2019     Lab Results   Component Value Date    TRIG 78 01/27/2020    TRIG 140 01/11/2020    TRIG 278 10/21/2019     Lab Results   Component Value Date    HDL 21 01/27/2020    HDL 27 01/11/2020    HDL 31 10/21/2019     Lab Results   Component Value Date    LDLCALC 21 01/27/2020    LDLCALC 103 01/11/2020    [address] 10/21/2019     Lab Results   Component Value Date    LDLDIRECT 187 08/01/2017     No results found for: CHOLHDLRATIO    Hypertension   Controlled , denies symptoms of dizziness, light headedness. Occasional dependent edema. Tries to follow a salt restricted diet. Lab Results   Component Value Date     09/09/2020    K 3.2 09/09/2020    CL 90 09/09/2020    CO2 22 09/09/2020    BUN 49 09/09/2020    CREATININE 2.23 09/09/2020    GLUCOSE 184 09/09/2020    CALCIUM 9.6 09/09/2020    PROT 8.1 09/05/2020    LABALBU 4.2 09/09/2020    BILITOT 2.9 (A) 09/09/2020    ALKPHOS 224 09/09/2020    AST 13 09/09/2020    ALT 8 09/09/2020    LABGLOM 22 (A) 09/05/2020    GFRAA 26 (A) 09/05/2020    AGRATIO 1.0 (L) 09/05/2020    GLOB 4.1 09/05/2020     The ASCVD Risk score (Clementina Gaines, et al., 2013) failed to calculate for the following reasons: The valid total cholesterol range is 130 to 320 mg/dL    Family History   Problem Relation Age of Onset    Heart Disease Maternal Grandmother     Cancer Mother         lung and brain cancer    Cancer Maternal Aunt         lung and brain cancer     Review of Systems   Constitutional: Negative for activity change, appetite change, diaphoresis, fever and unexpected weight change. HENT: Negative for dental problem. Eyes: Negative for pain and visual disturbance. Respiratory: Negative for shortness of breath. Cardiovascular: Negative for chest pain, palpitations and leg swelling. Gastrointestinal: Negative for constipation, diarrhea and nausea. Endocrine: Negative for cold intolerance, heat intolerance, polydipsia, polyphagia and polyuria.    Genitourinary: Negative for frequency and urgency. Musculoskeletal: Negative for arthralgias, joint swelling and myalgias. Skin: Negative for color change and pallor. Neurological: Negative for weakness, numbness and headaches. Psychiatric/Behavioral: Negative for dysphoric mood and sleep disturbance. The patient is not nervous/anxious. There were no vitals filed for this visit. televisit    Physical Exam televisit    Skeletal foot exam: televisit    ulcer on right and left toes, managed per podiatry and dressings not removed per patient request.  Toenails are normal. Pulses are +2 DP bilaterally. Skeletal structures are intact upon visual examination. Sensory: 10 g monofilament is 3/10 on the right and 3/10 on the left, 128 Hz vibration sense is decreased bilaterally.       Diabetes Continuous Glucose Monitoring Report      Reason for Study:   - Poorly controlled DM without success with standard interventions   - Glucose variability      Current Therapy:   Lantus: 35 units BID  Humalo units AC TID  Victoza 1.2 mg    CGMS Report   CGMS Device: Freestyle Yuliana Pro  Data collection from 19 to 19  Range set @   Average reading 196 mg/dL   Above target range 62%  In target range 38%  Below target range 0%      Impression:   Postprandial hyperglycemia   Persistent overnight hyperglycemia    Recommendation:   Current A1c   Lab Results   Component Value Date    LABA1C 7.2 2020     Goal A1c <6.5%  Medication Change: no longer on Metformin  Insulin Change: see titration protocol below  Dietary Recommendations : discussed decreasing the carb intake: averaging > 15- grams per day    Assessment  Karely Navarro is a 62 y.o. female with uncontrolled Diabetes Mellitus type 2 complicated by peripheral neuropathy, slow healing wounds, blurry vision, and microalbuminuria and assoc/iated with HTN, HLD, PAHTN, PVD, CAD, CLINT, obesity and periodontal disease    Plan  Problem List Items Addressed This Visit     Class 2 severe obesity 1 Unit Dial, 100 UNIT/ML SOPN    Liraglutide (VICTOZA) 18 MG/3ML SOPN SC injection    Insulin Pen Needle (B-D ULTRAFINE III SHORT PEN) 31G X 8 MM MISC    Vitamin D deficiency - Primary     Lab Results   Component Value Date    VITD25 12.7 (L) 10/21/2019     Has not followed up with levels  See Nephrology         Relevant Orders    Vitamin D 25 Hydroxy      Other Visit Diagnoses     Diabetes mellitus type 2 in obese (HCC)        Relevant Medications    Insulin Degludec (TRESIBA FLEXTOUCH) 100 UNIT/ML SOPN    insulin lispro, 1 Unit Dial, 100 UNIT/ML SOPN    Liraglutide (VICTOZA) 18 MG/3ML SOPN SC injection    Insulin Pen Needle (B-D ULTRAFINE III SHORT PEN) 31G X 8 MM MISC    Other Relevant Orders    Lipid Panel    Comprehensive Metabolic Panel    Hemoglobin A1C    TSH WITH REFLEX TO FT4    Type 2 diabetes mellitus with stage 3 chronic kidney disease, with long-term current use of insulin (HCC)  (Chronic)       Relevant Medications    Insulin Degludec (TRESIBA FLEXTOUCH) 100 UNIT/ML SOPN    insulin lispro, 1 Unit Dial, 100 UNIT/ML SOPN    Liraglutide (VICTOZA) 18 MG/3ML SOPN SC injection      Greater than 40 minutes spent directly counseling patient about topics listed above (such as lifestyle modifications, preventative screenings and/or disease related processes. Return in about 3 months (around 12/22/2020).    Form for disability scanned in chart: completed

## 2020-09-23 NOTE — ASSESSMENT & PLAN NOTE
Lab Results   Component Value Date    VITD25 12.7 (L) 10/21/2019     Has not followed up with levels  See Nephrology

## 2020-09-23 NOTE — ASSESSMENT & PLAN NOTE
Lab Results   Component Value Date    LABA1C 7.2 06/03/2020   Goal is < 7    Titrate Tresiba to morning fasting of   Current dose 30; will call if higher dose needed for new rx  Humalog : AC TID 2: 50 >150    Other meds:continue victoza  Reviewed GFR @ 22    Call the office if blood sugars are less than 60 or more than 500. Diet :   30-40 grams per meal , 3 meals per day, avoid carbs in snack choices  Include moderate proteins and good fats   Ensure adequate hydration and  electrolyte replacement    Exercise :  Recommended exercise is 5-7 days a week for 30-60 mins at least, per day OR a total of 2.5 hours per week, or ambulate as possible which ever is more feasible. Diabetic Health Maintenance  Follow up with annual eye exams  Follow up with annual podiatry exams if needed  Reviewed sick day management of BG     Other areas of Diabetic Education reviewed:   Carbs: good carbs and bad carbs, importance of carb counting, incorporation of protein with each meal to reduce Glycemic index, importance of portions, Carb/insulin ratio   Fats: Good fats and bad fats, meal planning and supplements.  Discussed how food affects blood sugar readings.     Different diabetic medications   Managing high and low sugar readings   Effects of smoking and diabetes   Rotation of sites for subcutaneous medication injection

## 2020-09-30 NOTE — PLAN OF CARE
Patient is a new return with reoccurrence with venous leg ulcers. Patient states she is working on smoking and MD encouraged her on importance of smoking cessation not only for overall health but for wound healing. Will apply compression today and patient is to follow up in [address] [place] [address],[address] in 1 week. Discharge instructions reviewed with patient, all questions answered, copy given to patient. Dressings were applied to all wounds per M.D. Instructions at this visit.

## 2020-10-02 PROBLEM — Z95.1 AORTOCORONARY BYPASS STATUS: Chronic | Status: RESOLVED | Noted: 2020-08-04 | Resolved: 2020-01-01

## 2020-10-02 PROBLEM — T14.8XXA MULTIPLE WOUNDS OF SKIN: Status: RESOLVED | Noted: 2020-08-05 | Resolved: 2020-01-01

## 2020-10-02 PROBLEM — N18.30 STAGE 3 CHRONIC KIDNEY DISEASE (HCC): Status: RESOLVED | Noted: 2019-01-09 | Resolved: 2020-01-01

## 2020-10-02 PROBLEM — E87.1 HYPONATREMIA: Status: RESOLVED | Noted: 2018-05-23 | Resolved: 2020-01-01

## 2020-10-02 PROBLEM — E11.621 DIABETIC ULCER OF LEFT FOOT ASSOCIATED WITH TYPE 2 DIABETES MELLITUS, LIMITED TO BREAKDOWN OF SKIN (HCC): Status: RESOLVED | Noted: 2020-01-18 | Resolved: 2020-01-01

## 2020-10-02 PROBLEM — D62 ACUTE BLOOD LOSS ANEMIA: Status: RESOLVED | Noted: 2020-08-05 | Resolved: 2020-01-01

## 2020-10-02 PROBLEM — N17.9 ACUTE KIDNEY INJURY SUPERIMPOSED ON CKD (HCC): Status: RESOLVED | Noted: 2020-08-05 | Resolved: 2020-01-01

## 2020-10-02 PROBLEM — E11.42 DIABETIC POLYNEUROPATHY ASSOCIATED WITH TYPE 2 DIABETES MELLITUS (HCC): Status: RESOLVED | Noted: 2020-07-02 | Resolved: 2020-01-01

## 2020-10-02 PROBLEM — L97.521 DIABETIC ULCER OF LEFT FOOT ASSOCIATED WITH TYPE 2 DIABETES MELLITUS, LIMITED TO BREAKDOWN OF SKIN (HCC): Status: RESOLVED | Noted: 2020-01-18 | Resolved: 2020-01-01

## 2020-10-02 PROBLEM — N18.9 ACUTE KIDNEY INJURY SUPERIMPOSED ON CKD (HCC): Status: RESOLVED | Noted: 2020-08-05 | Resolved: 2020-01-01

## 2020-10-02 PROBLEM — E87.6 HYPOKALEMIA: Status: RESOLVED | Noted: 2019-04-08 | Resolved: 2020-01-01

## 2020-10-02 PROBLEM — I73.9 CLAUDICATION IN PERIPHERAL VASCULAR DISEASE (HCC): Status: RESOLVED | Noted: 2019-05-16 | Resolved: 2020-01-01

## 2020-10-02 PROBLEM — D72.829 LEUKOCYTOSIS: Status: RESOLVED | Noted: 2020-08-05 | Resolved: 2020-01-01

## 2020-10-02 PROBLEM — S37.092A: Status: RESOLVED | Noted: 2020-08-04 | Resolved: 2020-01-01

## 2020-10-02 PROBLEM — I50.43 ACUTE ON CHRONIC COMBINED SYSTOLIC AND DIASTOLIC CONGESTIVE HEART FAILURE (HCC): Status: RESOLVED | Noted: 2020-01-10 | Resolved: 2020-01-01

## 2020-10-02 PROBLEM — R10.11 RIGHT UPPER QUADRANT PAIN: Status: RESOLVED | Noted: 2020-08-18 | Resolved: 2020-01-01

## 2020-10-02 NOTE — PROGRESS NOTES
[facility] Progress Note    Emerald Bautista     : 1965    DATE OF VISIT:  2020    Subjective:     Rona Concepcion is a 62 y.o. female who has a venous and  lymphedema ulcer located on the bilateral lower leg. Significant symptoms or pertinent wound history since last visit: she's not been here in a couple of months, due to (a) a renal artery intervention that was complicated by a perinephric hematoma, which required additional treatment, (b) an admission for CHF, including treatment of a subclavian stenosis that was contributing to coronary steal syndrome, and (c) a period of time when she was out of town. Not a large degree of change in her legs since I saw her last -- significant swelling BL, a number of ulcers, all seem partial thickness, drainage maybe a bit greater than before, some new areas open, but some older areas healed a bit. She's out of some of her wound-care supplies. Still smoking, but down to 1/4 pack of cigarettes per day. Additional ulcer(s) noted? no. Just a tiny eschar at the tip of one left lesser toe. Her current medication list consists of ARIPiprazole, Doxepin HCl, INSULIN SYRINGE .5CC/29G, Insulin Degludec, Insulin Pen Needle, Liraglutide, albuterol sulfate HFA, aspirin EC, atorvastatin, benztropine, blood glucose test strips, buprenorphine-naloxone, busPIRone, clopidogrel, ferrous sulfate, insulin aspart, lamoTRIgine, magnesium oxide, midodrine, pantoprazole, potassium chloride, spironolactone, tiotropium, torsemide, and traZODone.     Allergies: Lisinopril    Objective:     Vitals:    20 0955   BP: 99/70  Comment: pt asymptomatic   Pulse: 77   Resp: 18   Temp: 97 °F (36.1 °C)   TempSrc: Oral   Weight: 227 lb (103 kg)   Height: 5' 4\" (1.626 m)     AAOx3, chronically ill appearing, overweight, fatigued, NAD  Dopplerable distal pulses, feet largely warm, slow cap refill  Small dry eschar on the tip of one left lesser toe  Moderate BL LE stage 2 lymphedema  No cellulitis, angitis, fluctuance, just some venous erythema  I think not quite as much allodynia as she had at some of the ulcers in the past  Shaye-ulcer skin: indurated, pink, warm and stasis dermatitis. Ulcer(s): a number of clusters of ulcers, I believe all partial thickness, some partly granular, mostly superficial fibrosis, fibrin, biofilm, serous exudate, no purulence, no deeper necrosis. Photos also saved in electronic chart.     Today's wound measurements, per RN documentation:  Wound 09/30/20 #5, Right Lower Extremity CLUSTER, Venous, Partial Thickness, Onset 9/21/20-Wound Length (cm): 16.5 cm  Wound 09/30/20 #6, Left pretib CLUSTER, Venous, Partial Thickness, Onset 9/21/20-Wound Length (cm): 10.4 cm  Wound 09/30/20 #7, Left Posterior Leg CLUSTER, Venous, Partial Thickness, Onset 9/21/20-Wound Length (cm): 6.3 cm    Wound 09/30/20 #5, Right Lower Extremity CLUSTER, Venous, Partial Thickness, Onset 9/21/20-Wound Width (cm): 19.5 cm  Wound 09/30/20 #6, Left pretib CLUSTER, Venous, Partial Thickness, Onset 9/21/20-Wound Width (cm): 10.5 cm  Wound 09/30/20 #7, Left Posterior Leg CLUSTER, Venous, Partial Thickness, Onset 9/21/20-Wound Width (cm): 3.5 cm    Wound 09/30/20 #5, Right Lower Extremity CLUSTER, Venous, Partial Thickness, Onset 9/21/20-Wound Depth (cm): 0.3 cm  Wound 09/30/20 #6, Left pretib CLUSTER, Venous, Partial Thickness, Onset 9/21/20-Wound Depth (cm): 0.1 cm  Wound 09/30/20 #7, Left Posterior Leg CLUSTER, Venous, Partial Thickness, Onset 9/21/20-Wound Depth (cm): 0.2 cm    Assessment:     Patient Active Problem List   Diagnosis Code    Type 2 diabetes mellitus with diabetic polyneuropathy, with long-term current use of insulin ([facility]) E11.42, Z79.4    Essential hypertension I10    CLINT (obstructive sleep apnea) G47.33    Tobacco abuse disorder Z72.0    GERD (gastroesophageal reflux disease) K21.9    Anxiety and depression F41.9, F32.9    Iron deficiency anemia D50.9    CAD (coronary artery disease) I25.10    Mitral valve regurgitation I34.0    COPD (chronic obstructive pulmonary disease) ([facility]) J44.9    Vitamin D deficiency E55.9    Dyslipidemia E78.5    Abel's esophagus K22.70    Viral hepatitis C B19.20    Pulmonary nodule R91.1    Class 2 severe obesity due to excess calories with serious comorbidity and body mass index (BMI) of 39.0 to 39.9 in adult ([facility]) E66.01, Z68.39    Bipolar disorder ([facility]) F31.9    Pulmonary hypertension ([facility]) I27.20    Bilateral lower extremity edema R60.0    Habitual self-excoriation F42.4    Ulcer of left lower leg, limited to breakdown of skin ([facility]) L97.921    Ulcer of right leg, limited to breakdown of skin ([facility] L97.911    Arthritis M19.90    Cardiomyopathy ([facility]) I42.9    Chronic systolic heart failure ([facility]) I50.22    Peripheral venous insufficiency I87.2    Atherosclerosis of native artery of both lower extremities with bilateral ulceration of calves ([facility]) I70.232, I70.242    Morbid obesity with BMI of 40.0-44.9, adult ([facility]) E66.01, Z68.41    CKD (chronic kidney disease) stage 4, GFR 15-29 ml/min ([facility]) N18.4       Assessment of today's active condition(s): venous stasis, multifactorial LE edema (venous disease, obesity, CHF, CKD), small and partial thickness but numerous and chronic lower leg ulcers; no definite wound ischemia, no active infection. Ideally would compress her much more, but I worry about causing another episode of acute CHF -- will go lightly on the left, variable (self-directed) on the right, and slowly increase as tolerated. Factors contributing to occurrence and/or persistence of the chronic ulcer include venous stasis, lymphedema, diabetes, decreased mobility, obesity, smoking and decreased tissue oxygenation. Medical necessity of today's visit is shown by the above documentation. Sharp debridement is indicated today, based upon the exam findings in the wound(s) above.      Procedure note:     Consent obtained. Time out performed per [facility] policy. Anesthetic  Anesthetic: 4% Lidocaine Cream     Using a curette, I sharply debrided the bilateral lower leg ulcer(s) down through and including the removal of dermis. The type(s) of tissue debrided included fibrin, biofilm, necrotic/eschar and exudate. Total Surface Area Debrided: approx 30 total sq cm. The ulcers were then irrigated with normal saline solution. The procedure was completed with a small amount of bleeding, and hemostasis was with pressure. The patient tolerated the procedure well, with no significant complications. The patient's level of pain during and after the procedure was monitored. Post-debridement measurements, if different from pre-debridement, are in the flowsheet as well. Discharge plan:     Treatment in the wound care center today, per RN documentation: Wound 09/30/20 #5, Right Lower Extremity CLUSTER, Venous, Partial Thickness, Onset 9/21/20-Dressing/Treatment: (triad+PSO, adaptic, gauze, kerlix, surepress)  Wound 09/30/20 #6, Left pretib CLUSTER, Venous, Partial Thickness, Onset 9/21/20-Dressing/Treatment: (triad, silver Alg, gauze, compri 2 lyte, spandigrip G)  Wound 09/30/20 #7, Left Posterior Leg CLUSTER, Venous, Partial Thickness, Onset 9/21/20-Dressing/Treatment: (triad, silver Alg, gauze, compri 2 lyte, spandigrip G). Per physician order, left application of multilayer compression wrap was performed in the wound care center today, to help manage edema, stasis dermatitis, and/or venous ulcers. Leave primary dressing and multi-layer wrap(s) in place until the next appointment. Also discussed ways to keep the wrap dry for a shower, including a plastic cast-guard, available in retail pharmacies.  She should call before her next visit if there is any significant pain, significant strike-through of drainage to the surface of the wrap, or any significant sense of the wrap sliding down more than an inch or so, bunching-up and abrading her skin. If she should feel increased shortness of breath this week, call us if possible, or stop compressing the right leg, and/or remove the left leg compression wrap if needed. Will order some new supplies (Triad, Adaptic and gauze) from her [employer] company. I reminded the patient of the importance of weight management and smoking cessation, if applicable; also encouraged ambulation as tolerated, additional lower extremity exercises as instructed in our education sheet, leg elevation when at rest, and compliance with any recommended dietary, diuretic and compression therapies. Home treatment: good handwashing before and after any dressing changes. Cleanse wound with saline or soap & water before dressing change. May use Vaseline (petrolatum), Aquaphor, Aveeno, CeraVe, Cetaphil, Eucerin, Lubriderm, etc for dry skin. Dressing type for home: as above for the RLE, once daily (use more Triad if wounds are wet, more antibiotic ointment if they're dry). Also a small amount of Betadine to that left toe. Written discharge instructions given to patient. Follow up in 1 week.     Electronically signed by Ana Cordero MD on 10/2/2020 at 8:20 AM.

## 2020-10-07 NOTE — PLAN OF CARE
Wounds debrided per MD & pt. Tolerated well. Pt. C/o discomfort with the compression wrap on left leg at times & reinforced importance of elevation to help with edema control. Pt. Was able to do daily dressing changes on right leg & tolerating ok. Will cont. With compression wrap on left leg & Surepress on right leg for edema control. F/u in [facility] in 1 week as ordered. Discharge instructions reviewed with patient, all questions answered, copy given to patient. Dressings were applied to all wounds per M.D. Instructions at this visit.

## 2020-10-08 NOTE — PROGRESS NOTES
[facility] Progress Note    Mercedes Bautista     : 1965    DATE OF VISIT:  10/7/2020    Subjective:     Juan Daniel Sinclair is a 62 y.o. female who has a venous and  lymphedema ulcer located on the bilateral lower leg. Significant symptoms or pertinent wound history since last visit: did fairly ok this week, but she felt that she had to remove her left sided compression wrap yesterday because of pain, despite the fact that she was taking her diuretics, elevating, and I think she's lost a bit of fluid weight. No F/C/D. Dyspnea about her baseline. Still smoking. No pus or malodor from the wounds, mild pruritus, no spreading redness. Additional ulcer(s) noted? no. Just a tiny eschar on the left third toe, stable, not open. Her current medication list consists of ARIPiprazole, Doxepin HCl, INSULIN SYRINGE .5CC/29G, Insulin Degludec, Insulin Pen Needle, Liraglutide, albuterol sulfate HFA, aspirin EC, atorvastatin, benztropine, blood glucose test strips, buprenorphine-naloxone, busPIRone, clopidogrel, doxycycline hyclate, ferrous sulfate, insulin aspart, lamoTRIgine, magnesium oxide, metOLazone, midodrine, pantoprazole, potassium chloride, spironolactone, tiotropium, torsemide, and traZODone. Allergies: Lisinopril     Objective:     Vitals:    10/07/20 1012 10/07/20 1029   BP:  136/80   Pulse:  115   Resp: 22 20   Temp:  97 °F (36.1 °C)   TempSrc: Oral Oral   Weight: 225 lb 6.4 oz (102.2 kg)    Height: 5' 4\" (1.626 m)      AAOx3, overweight, chronically ill appearing, fatigued, NAD  Dopplerable distal pulses, feet warm, borderline cap refill  Moderate BL LE edema  No cellulitis, angitis, fluctuance  Tiny stable eschar on the tip of the left 3rd toe  Shaye-ulcer skin: indurated, pink, warm, dry and a bit of peeling hyperkeratosis in places.   Ulcer(s): overall I think they look a little better than last week - a few spots might be a bit smaller, a few spots definitely look a little more granular and less fibrotic, one pretibial focus might be a bit larger, but that tissue looks the most likely to heal quickly actually; most with some fibrin and biofilm, a mix of serous exudate and a bit of bleeding, none with extension into fat tissue, none with signs of infection. Photos also saved in electronic chart.     Today's wound measurements, per RN documentation:  Wound 09/30/20 #5, Right Lower Extremity CLUSTER, Venous, Partial Thickness, Onset 9/21/20-Wound Length (cm): 10 cm  Wound 09/30/20 #6, Left pretib CLUSTER, Venous, Partial Thickness, Onset 9/21/20-Wound Length (cm): 8 cm  Wound 09/30/20 #7, Left Posterior Leg CLUSTER, Venous, Partial Thickness, Onset 9/21/20-Wound Length (cm): 5 cm    Wound 09/30/20 #5, Right Lower Extremity CLUSTER, Venous, Partial Thickness, Onset 9/21/20-Wound Width (cm): 10 cm  Wound 09/30/20 #6, Left pretib CLUSTER, Venous, Partial Thickness, Onset 9/21/20-Wound Width (cm): 0.5 cm  Wound 09/30/20 #7, Left Posterior Leg CLUSTER, Venous, Partial Thickness, Onset 9/21/20-Wound Width (cm): 5 cm    Wound 09/30/20 #5, Right Lower Extremity CLUSTER, Venous, Partial Thickness, Onset 9/21/20-Wound Depth (cm): 0.2 cm  Wound 09/30/20 #6, Left pretib CLUSTER, Venous, Partial Thickness, Onset 9/21/20-Wound Depth (cm): 0.1 cm  Wound 09/30/20 #7, Left Posterior Leg CLUSTER, Venous, Partial Thickness, Onset 9/21/20-Wound Depth (cm): 0.1 cm    Assessment:     Patient Active Problem List   Diagnosis Code    Type 2 diabetes mellitus with diabetic polyneuropathy, with long-term current use of insulin ([facility]) E11.42, Z79.4    Essential hypertension I10    CLINT (obstructive sleep apnea) G47.33    Tobacco abuse disorder Z72.0    GERD (gastroesophageal reflux disease) K21.9    Anxiety and depression F41.9, F32.9    Iron deficiency anemia D50.9    CAD (coronary artery disease) I25.10    Mitral valve regurgitation I34.0    COPD (chronic obstructive pulmonary disease) ([facility] J44.9    elevation when at rest, and compliance with any recommended dietary, diuretic and compression therapies. Home treatment: good handwashing before and after any dressing changes. Cleanse wound with saline or soap & water before dressing change. May use Vaseline (petrolatum), Aquaphor, Aveeno, CeraVe, Cetaphil, Eucerin, Lubriderm, etc for dry skin. Dressing type for home: to the right leg, Triad + polysporin (more of the former if wounds are wet and weeping, more of the latter if they're dry), Adaptic if needed to prevent secondary layers from sticking, gauze, Kerlix, SurePress, once daily. Written discharge instructions given to patient. Follow up in 1 week.     Electronically signed by Leidy Mitchell MD on 10/8/2020 at 2:43 PM.

## 2020-10-13 NOTE — TELEPHONE ENCOUNTER
Within this Telehealth Consent, the terms you and yours refer to the person using the Telehealth Service (Service), or in the case of a use of the Service by or on behalf of a minor, you and yours refer to and include (i) the parent or legal guardian who provides consent to the use of the Service by such minor or uses the Service on behalf of such minor, and (ii) the minor for whom consent is being provided or on whose behalf the Service is being utilized. When using Service, you will be consulting with your health care providers via the use of Telehealth.   Telehealth involves the delivery of healthcare services using electronic communications, information technology or other means between a healthcare provider and a patient who are not in the same physical location. Telehealth may be used for diagnosis, treatment, follow-up and/or patient education, and may include, but is not limited to, one or more of the following:    Electronic transmission of medical records, photo images, personal health information or other data between a patient and a healthcare provider    Interactions between a patient and healthcare provider via audio, video and/or data communications    Use of output data from medical devices, sound and video files    Anticipated Benefits   The use of Telehealth by your Provider(s) through the Service may have the following possible benefits:    Making it easier and more efficient for you to access medical care and treatment for the conditions treated by such Provider(s) utilizing the Service    Allowing you to obtain medical care and treatment by Provider(s) at times that are convenient for you    Enabling you to interact with Provider(s) without the necessity of an in-office appointment     Possible Risks   While the use of Telehealth can provide potential benefits for you, there are also potential risks associated with the use of Telehealth.  These risks include, but may not be limited to the following:    Your Provider(s) may not able to provide medical treatment for your particular condition and you may be required to seek alternative healthcare or emergency care services.  The electronic systems or other security protocols or safeguards used in the Service could fail, causing a breach of privacy of your medical or other information.  Given regulatory requirements in certain jurisdictions, your Provider(s) diagnosis and/or treatment options, especially pertaining to certain prescriptions, may be limited. Acceptance   1. You understand that Services will be provided via Telehealth. This process involves the use of HIPAA compliant and secure, real-time audio-visual interfacing with a qualified and appropriately trained provider located at [facility]. 2. You understand that, under no circumstances, will this session be recorded. 3. You understand that the Provider(s) at [facility] and other clinical participants will be party to the information obtained during the Telehealth session in accordance with best medical practices. 4. You understand that the information obtained during the Telehealth session will be used to help determine the most appropriate treatment options. 5. You understand that You have the right to revoke this consent at any point in time. 6. You understand that Telehealth is voluntary, and that continued treatment is not dependent upon consent. 7. You understand that, in the event of non-consent to Telehealth services and/or technical difficulties, you will obtain services as typically provided in the absence of Telehealth technology. 8. You understand that this consent will be kept in Your medical record. 9. No potential benefits from the use of Telehealth or specific results can be guaranteed. Your condition may not be cured or improved and, in some cases, may get worse.    10. There are limitations in the provision of medical care and treatment via Telehealth and the Service and you may not be able to receive diagnosis and/or treatment through the Service for every condition for which you seek diagnosis and/or treatment. 11. There are potential risks to the use of Telehealth, including but not limited to the risks described in this Telehealth Consent. 12. Your Provider(s) have discussed the use of Telehealth and the Service with you, including the benefits and risks of such and you have provided oral consent to your Provider(s) for the use of Telehealth and the Service. 15. You understand that it is your duty to provide your Provider(s) truthful, accurate and complete information, including all relevant information regarding care that you may have received or may be receiving from other healthcare providers outside of the Service. 14. You understand that each of your Provider(s) may determine in his or sole discretion that your condition is not suitable for diagnosis and/or treatment using the Service, and that you may need to seek medical care and treatment a specialist or other healthcare provider, outside of the Service. 15. You understand that you are fully responsible for payment for all services provided by Provider(s) or through use of the Service and that you may not be able to use third-party insurance. 16. You represent that (a) you have read this Telehealth Consent carefully, (b) you understand the risks and benefits of the Service and the use of Telehealth in the medical care and treatment provided to you by Provider(s) using the Service, and (c) you have the legal capacity and authority to provide this consent for yourself and/or the minor for which you are consenting under applicable federal and state laws, including laws relating to the age of [de-identified] and/or parental/guardian consent.    17. You give your informed consent to the use of Telehealth by Provider(s) using the Service under

## 2020-10-13 NOTE — PROGRESS NOTES
[facility]                                                          TELEHEALTH EVALUATION: Service performed was Audio (During XVG-31 public health emergency) and not a face-to-face visit           CHIEF COMPLAINT:  Follow up CT chest       HPI:   CT chest reviewed by me and noted below. Results were dicussed with patient and multiple good questions were answered.       Doing good   Some cough   No hemoptysis   Still smoking < 1/2 ppd   Patient is compliant with inhaled bronchodilators         Past Medical History:   Diagnosis Date    Acute blood loss anemia 8/5/2020    Acute kidney injury ([address] 12/25/2019    Acute kidney injury superimposed on CKD ([address] 8/5/2020    Acute on chronic combined systolic and diastolic congestive heart failure ([address] 1/10/2020    Acute on chronic right-sided heart failure ([address] 08/2020    Alcohol dependence ([address] 3/10/2010    Cellulitis 6/3/2020    Diabetic ulcer of left foot associated with type 2 diabetes mellitus, limited to breakdown of skin ([address] 1/18/2020    Diabetic ulcer of toe of right foot associated with type 2 diabetes mellitus ([address] 1/18/2020    Left renal artery stenosis ([address] 08/2020    MI (myocardial infarction) ([address] 10/07/2019    NSTEMI    Positive FIT (fecal immunochemical test) 2/28/2020    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 steal syndrome 09/01/2020    Traumatic perinephric hematoma, left, initial encounter 8/4/2020       Past Surgical History:        Procedure Laterality Date    ARTERIAL BYPASS SURGRY Left 01/06/2016    Axillary/Subclavian to Brachial Bypass w/reversed SVG    CARDIAC CATHETERIZATION  03/27/2018    Dr. Christina Carpenter - at [address] [place]  01/20/2020    Dr. Jessica Bar      pancreatitis post surgery    CORONARY ANGIOPLASTY WITH STENT PLACEMENT  03/16/2018    MELODY- 3.5 x 38 and 3.5 x 20 to Cx  Heart Disease Maternal Grandmother     Cancer Mother         lung and brain cancer    Cancer Maternal Aunt         lung and brain cancer       Current Medications:    Current Outpatient Medications:     metOLazone (ZAROXOLYN) 2.5 MG tablet, Take 2.5 mg by mouth three times a week [facility], Disp: , Rfl:     Insulin Degludec (TRESIBA FLEXTOUCH) 100 UNIT/ML SOPN, Inject 30 Units into the skin nightly, Disp: 10 pen, Rfl: 5    Liraglutide (VICTOZA) 18 MG/3ML SOPN SC injection, Inject 1.2 mg into the skin daily, Disp: 2 pen, Rfl: 3    Insulin Pen Needle (B-D ULTRAFINE III SHORT PEN) 31G X 8 MM MISC, Five shots a day, Disp: 200 each, Rfl: 2    spironolactone (ALDACTONE) 50 MG tablet, Take 1 tablet by mouth daily, Disp: 30 tablet, Rfl: 5    torsemide (DEMADEX) 20 MG tablet, Take 3 tablets by mouth daily, Disp: 90 tablet, Rfl: 3    potassium chloride (KLOR-CON M) 20 MEQ extended release tablet, Take 2 tablets by mouth 3 times daily, Disp: 180 tablet, Rfl: 3    midodrine (PROAMATINE) 10 MG tablet, Take 1 tablet by mouth 3 times daily (with meals), Disp: 90 tablet, Rfl: 3    insulin aspart (NOVOLOG FLEXPEN) 100 UNIT/ML injection pen, Inject 10 Units into the skin 3 times daily (before meals), Disp: 5 pen, Rfl: 3    traZODone (DESYREL) 100 MG tablet, Take 100 mg by mouth nightly as needed for Sleep Can take 1-2 tabs nightly, Disp: , Rfl:     DOXEPIN HCL PO, Take 1 capsule by mouth nightly as needed Patient unsure of exact dosage, Disp: , Rfl:     albuterol sulfate  (90 Base) MCG/ACT inhaler, Inhale 2 puffs into the lungs every 6 hours as needed for Wheezing or Shortness of Breath, Disp: 1 Inhaler, Rfl: 3    atorvastatin (LIPITOR) 80 MG tablet, Take 1 tablet by mouth nightly, Disp: 90 tablet, Rfl: 3    tiotropium (SPIRIVA RESPIMAT) 2.5 MCG/ACT AERS inhaler, INHALE 2 PUFFS INTO THE LUNGS DAILY, Disp: 1 Inhaler, Rfl: 6    buprenorphine-naloxone (SUBOXONE) 8-2 MG FILM SL film, Place 1 Film under the tongue 2 times daily. , Disp: , Rfl:     benztropine (COGENTIN) 1 MG tablet, Take 1 mg by mouth nightly , Disp: , Rfl:     blood glucose test strips (EXACTECH TEST) strip, 6 times daily. , Disp: 200 strip, Rfl: 6    INSULIN SYRINGE .5CC/29G 29G X 1/2\" 0.5 ML MISC, 1 each by Does not apply route daily, Disp: 100 each, Rfl: 3    pantoprazole (PROTONIX) 40 MG tablet, Take 1 tablet by mouth 2 times daily, Disp: 60 tablet, Rfl: 0    clopidogrel (PLAVIX) 75 MG tablet, TAKE 1 TABLET BY MOUTH EVERY DAY, Disp: 30 tablet, Rfl: 5    magnesium oxide (MAG-OX) 400 (240 Mg) MG tablet, TAKE 1 TABLET ONCE DAILY, Disp: 30 tablet, Rfl: 11    busPIRone (BUSPAR) 30 MG tablet, Take 30 mg by mouth 2 times daily , Disp: , Rfl:     aspirin EC 81 MG EC tablet, Take 1 tablet by mouth daily, Disp: 30 tablet, Rfl: 3    ARIPiprazole (ABILIFY) 10 MG tablet, Take 10 mg by mouth daily , Disp: , Rfl:     lamoTRIgine (LAMICTAL) 150 MG tablet, Take 200 mg by mouth 2 times daily , Disp: , Rfl:     ferrous sulfate (IRON 325) 325 (65 Fe) MG tablet, Take 1 tablet by mouth 2 times daily (with meals), Disp: 60 tablet, Rfl: 0        Objective:   PHYSICAL EXAM:    Telephone visit not able to obtain physical exam       DATA reviewed by me:   PFTs 12/11/18 FVC 1.63(47%) FEV1 1.18(43%) FEV1/FVC 72% TLC 4.23(79%) DLCO 14.23(59%) 6MW 440 F low O2 86%  PFTs 12/11/18 FVC 1.63(47%) FEV1 1.18(43%) FEV1/FVC 72% TLC 4.23(79%) DLCO 14.23(59%) 6MW 440 F low O2 86%    CT chest 2/24/2020 imaging reviewed by me and showed  New curvilinear opacities left upper lobe left lower lobe likely atelectasis/scarring  Multiple new scattered subcentimeter nodules bilaterally    CT chest 9/11/2020 imaging reviewed by me and showed   Unchanged 3 mm left lower lobe nodule       PSG 12/7/18 AHI 23.7 and desaturation to 87%  CPAP titration 12/20/18 CPAP 8 cm H2O      CPAP data 02/04-03/05 2019 reviewed by me. Uses 3-4  hrs/night with 47% compliance and AHI of  0.9.   CPAP 8    Assessment: · New nodules on CT 2/24/2020 -favor inflammatory. Not clearly seen on repeat CT 9/11/2020. · Chronic bronchitis with probable emphysema  · CAD, ischemic cardiomyopathy, mitral valve insufficiency, and chronic systolic heart failure (EF 30-35%). Post CABG 1/27/2020   · Moderate CLINT. CPAP 8 cm H2O. Declined retrying CPAP and alternative therapy. ONPO 11/6/2019 with no significant desaturation  · Mild restrictive defect with decreased diffusion capacity- likely due to obesity   · 45 pack year smoking - quit 8/2019       Plan:       Continue Spiriva daily and Albuterol 2 puffs Q4-6 hrs PRN  Advised to continue with smoking cessation  Patient is up to date with Pneumococcal vaccine    Advised to get influenza vaccine this year   CT chest, low dose protocol, screening for lung cancer September 2021. Risks, benefits and alternatives including doing nothing were discussed with patient. Follow up in 6 months           Leandra Simon is a 62 y.o. female evaluated via telephone on 10/13/2020. Consent:  She and/or health care decision maker is aware that that she may receive a bill for this telephone service, depending on her insurance coverage, and has provided verbal consent to proceed: Yes       Documentation:  I communicated with the patient and/or health care decision maker about: See above   Details of this discussion including any medical advice provided: See above       I Affirm this is a Patient Initiated Episode with an Established Patient who has not had a related appointment within my department in the past 7 days or scheduled within the next 24 hours.     Total Time: 21-30 minutes     Note: not billable if this call serves to triage the patient into an appointment for the relevant concern      Nadja Steve

## 2020-10-14 NOTE — PLAN OF CARE
Left leg wounds showing improvement, pt. Tolerating compression wraps without c/o. Wounds debrided per MD. Pt. agreeable to apply dressings & compression wraps to both legs to remain in place for the week as ordered. Reinforced importance of elevation & compression to help with circulation, edema control & wound healing. F/u in [facility] in 1 week as ordered, pt. Aware to call sooner with any problems or questions/concerns. Discharge instructions reviewed with patient, all questions answered, copy given to patient. Dressings were applied to all wounds per M.D. Instructions at this visit.

## 2020-10-17 PROBLEM — E11.621 DIABETIC ULCER OF RIGHT FOOT ASSOCIATED WITH TYPE 2 DIABETES MELLITUS, LIMITED TO BREAKDOWN OF SKIN (HCC): Status: ACTIVE | Noted: 2020-01-01

## 2020-10-17 PROBLEM — L97.511 DIABETIC ULCER OF RIGHT FOOT ASSOCIATED WITH TYPE 2 DIABETES MELLITUS, LIMITED TO BREAKDOWN OF SKIN (HCC): Status: ACTIVE | Noted: 2020-01-01

## 2020-10-17 NOTE — PROGRESS NOTES
[facility] Progress Note    Kayliesindi Bautista     : 1965    DATE OF VISIT:  10/14/2020    Subjective:     Michael Lopez is a 62 y.o. female who has a venous ulcer located on the bilateral lower leg. Significant symptoms or pertinent wound history since last visit: tolerated the LLE wrap better this week, left it in place all week. Lost a bit more weight, and no increased abd bloating or shortness of breath with this level of compression; doing well with daily dressings on the right, but that swelling and the healing of those wounds is slower to come around. Additional ulcer(s) noted? yes. Recurrent area of discomfort near the right 5th met head, where she recently had some callus, a small abscess and underlying ulcer. Her current medication list consists of ARIPiprazole, Doxepin HCl, INSULIN SYRINGE .5CC/29G, Insulin Degludec, Insulin Pen Needle, Liraglutide, albuterol sulfate HFA, aspirin EC, atorvastatin, benztropine, blood glucose test strips, buprenorphine-naloxone, busPIRone, clopidogrel, ferrous sulfate, insulin aspart, lamoTRIgine, magnesium oxide, metOLazone, midodrine, pantoprazole, potassium chloride, spironolactone, tiotropium, torsemide, and traZODone. Allergies: Lisinopril    Objective:     Vitals:    10/14/20 1032   BP: 123/78   Pulse: 100   Resp: 18   Temp: 96.9 °F (36.1 °C)   TempSrc: Axillary   Weight: 219 lb 12.8 oz (99.7 kg)   Height: 5' 4\" (1.626 m)     AAOx3, overweight, chronically ill appearing, NAD  Dopplerable distal pulses, feet largely warm, slow cap refill  Mild-mod left, mod-severe right lower extremity edema  No cellulitis, angitis, fluctuance  Tender callus at the right 5th met head  Shaye-ulcer skin: indurated, pink, warm, dry and hyperkeratotic. Ulcer(s): some areas on the lower legs are dry with some crusted exudate and hyperkeratosis, other areas still have a bit of drainage, part granular, part fibrotic, fibrin, biofilm, no pus. Photos also saved in electronic chart.     Today's wound measurements, per RN documentation:  Wound 09/30/20 #6, Left pretib CLUSTER, Venous, Partial Thickness, Onset 9/21/20-Wound Length (cm): 1.2 cm  Wound 09/30/20 #7, Left Posterior Leg CLUSTER, Venous, Partial Thickness, Onset 9/21/20-Wound Length (cm): 4.3 cm  Wound 09/30/20 #5, Right Lower Extremity CLUSTER, Venous, Partial Thickness, Onset 9/21/20-Wound Length (cm): 10 cm  Wound 10/14/20 #8 Right plantar foot, DFU, Scott 1, onset 10/14/2020-Wound Length (cm): 0.1 cm    Wound 09/30/20 #6, Left pretib CLUSTER, Venous, Partial Thickness, Onset 9/21/20-Wound Width (cm): 0.5 cm  Wound 09/30/20 #7, Left Posterior Leg CLUSTER, Venous, Partial Thickness, Onset 9/21/20-Wound Width (cm): 3 cm  Wound 09/30/20 #5, Right Lower Extremity CLUSTER, Venous, Partial Thickness, Onset 9/21/20-Wound Width (cm): 16 cm  Wound 10/14/20 #8 Right plantar foot, DFU, Scott 1, onset 10/14/2020-Wound Width (cm): 0.1 cm    Wound 09/30/20 #6, Left pretib CLUSTER, Venous, Partial Thickness, Onset 9/21/20-Wound Depth (cm): 0.1 cm  Wound 09/30/20 #7, Left Posterior Leg CLUSTER, Venous, Partial Thickness, Onset 9/21/20-Wound Depth (cm): 0.1 cm  Wound 09/30/20 #5, Right Lower Extremity CLUSTER, Venous, Partial Thickness, Onset 9/21/20-Wound Depth (cm): 0.2 cm  Wound 10/14/20 #8 Right plantar foot, DFU, Scott 1, onset 10/14/2020-Wound Depth (cm): 0.1 cm    Assessment:     Patient Active Problem List   Diagnosis Code    Type 2 diabetes mellitus with diabetic polyneuropathy, with long-term current use of insulin ([facility]) E11.42, Z79.4    Essential hypertension I10    CLINT (obstructive sleep apnea) G47.33    Tobacco abuse disorder Z72.0    GERD (gastroesophageal reflux disease) K21.9    Anxiety and depression F41.9, F32.9    Iron deficiency anemia D50.9    CAD (coronary artery disease) I25.10    Mitral valve regurgitation I34.0    COPD [facility]chronic obstructive pulmonary disease) [facility] J44.9    Vitamin D deficiency E55.9    Dyslipidemia E78.5    Abel's esophagus K22.70    Viral hepatitis C B19.20    Pulmonary nodule R91.1    Class 2 severe obesity due to excess calories with serious comorbidity and body mass index (BMI) of 39.0 to 39.9 in adult ([facility]) E66.01, Z68.39    Bipolar disorder ([facility]) F31.9    Pulmonary hypertension ([facility]) I27.20    Bilateral lower extremity edema R60.0    Habitual self-excoriation F42.4    Ulcer of left lower leg, limited to breakdown of skin ([facility]) L97.921    Ulcer of right leg, limited to breakdown of skin ([address] L97.911    Arthritis M19.90    Cardiomyopathy ([facility]) I42.9    Chronic systolic heart failure ([facility]) I50.22    Peripheral venous insufficiency I87.2    Atherosclerosis of native artery of both lower extremities with bilateral ulceration of calves ([facility]) I70.232, I70.242    Morbid obesity with BMI of 40.0-44.9, adult ([facility]) E66.01, Z68.41    CKD (chronic kidney disease) stage 4, GFR 15-29 ml/min ([facility]) N18.4       Assessment of today's active condition(s): venous stasis, chronic leg edema, poor cardiac and renal function, slowly improving lower leg ulcers; I think she would do better with more compression, wasn't sure she could tolerate that from a CHF standpoint before, but I think perhaps she can now. Also with a recurrent right 5th met head callus, presumably another small abscess and ulcer beneath. Factors contributing to occurrence and/or persistence of the chronic ulcer include edema, venous stasis, diabetes, decreased mobility, obesity, smoking and decreased tissue oxygenation. Medical necessity of today's visit is shown by the above documentation. Sharp debridement is indicated today, based upon the exam findings in the wound(s) above. Procedure note:     Consent obtained. Time out performed per [facility] policy.     Anesthetic  Anesthetic: 4% Lidocaine Cream     Using a curette and #15 blade scalpel, I sharply debrided the bilateral lower leg and foot (right, lateral, forefoot) ulcer(s) down through and including the removal of dermis. The type(s) of tissue debrided included fibrin, biofilm, necrotic/eschar and callus. Total Surface Area Debrided: approx 15 sq cm. There was just a tiny abscess beneath that foot callus - pain and tenderness instantly resolved after that was opened up, and the underlying ulcer was small, pink, not deep. The ulcers were then irrigated with normal saline solution. The procedure was completed with a small amount of bleeding, and hemostasis was with pressure. The patient tolerated the procedure well, with no significant complications. The patient's level of pain during and after the procedure was monitored. Post-debridement measurements, if different from pre-debridement, are in the flowsheet as well. Discharge plan:     Treatment in the wound care center today, per RN documentation: Wound 09/30/20 #6, Left pretib CLUSTER, Venous, Partial Thickness, Onset 9/21/20-Dressing/Treatment: (triad / AgAlg, gauze, compri 2 lyte,spandigrip G)  Wound 09/30/20 #7, Left Posterior Leg CLUSTER, Venous, Partial Thickness, Onset 9/21/20-Dressing/Treatment: (triad / AgAlg, gauze, compri 2 lyte,spandigripG)  Wound 09/30/20 #5, Right Lower Extremity CLUSTER, Venous, Partial Thickness, Onset 9/21/20-Dressing/Treatment: (triad / AgAlg, gauze, compri 2 lyte,spandigripG)  Wound 10/14/20 #8 Right plantar foot, DFU, Scott 1, onset 10/14/2020-Dressing/Treatment: (PSO, Mepilex border). For the drier and more crusted lesions on the lower legs, just Aquaphor. Per physician order, bilateral application of multilayer compression wrap was performed in the wound care center today, to help manage edema, stasis dermatitis, and/or venous ulcers. Leave primary dressing and multi-layer wrap(s) in place until the next appointment. Also discussed ways to keep the wrap dry for a shower, including a plastic cast-guard, available in retail pharmacies.  She should call before her next visit if there is any significant pain, significant strike-through of drainage to the surface of the wrap, or any significant sense of the wrap sliding down more than an inch or so, bunching-up and abrading her skin. I reminded the patient of the importance of weight management and smoking cessation, if applicable; also encouraged ambulation as tolerated, additional lower extremity exercises as instructed in our education sheet, leg elevation when at rest, and compliance with any recommended dietary, diuretic and compression therapies. Will try to wrap both legs this week, get the swelling down more. Also dispensed a Darco shoe with a PegAssist insert for the right foot. Will need better offloading in the long-term. No longer needs Betadine to that small left lesser toe eschar. Written discharge instructions given to patient. Follow up in 1 week.     Electronically signed by Tamiko Ward MD on 10/17/2020 at 9:11 AM.

## 2020-10-19 NOTE — TELEPHONE ENCOUNTER
Patient missed appointment today as went to wrong office. States had a bad night with nausea, chest pain and arm pain. Has had URI symptoms with cough, congestion and feeling cold. Treated with course of antibiotics, cough medicine. Denies any fevers. Still following with wound clinic, has dressing on left leg which stays on for 1 week and doing well. Weight today 219 lbs, no swelling. CardioMEMS pillow not working. Will try again today. If still not working recommended calling company. K of 2.9 on 10/14/20. Patient had not been taking full dose of potassium so has increased KCL to 40 meq QID for last 3 days. Reviewed remainder of results. Recommendations:   1. Continue KCL 40 meq QID  2. Decrease metolazone 2.5 mg from 3 times weekly to 2 times weekly  3. Try CardioMEMS transmission again today and if still not working, contact company  4. Repeat blood work this week (Thursday). Voiced understanding.    Xenia Soares, TRAY - CNP

## 2020-10-21 NOTE — PLAN OF CARE
Left leg wounds healed, will cont. With compression wraps for an additional week or two. Dr Scott Kennedy discussed need for long-term compression. Pt. Agreeable to purchase compression garments since her insurance will not cover velcro garments. Pt. To order Juzo compression garment next week. Right plantar foot wound healed, Pt. Encouraged to cont. With Darco shoe with peg assist insert for off-loading. Dr Scott Kennedy will work on getting diabetic shoes & inserts in the near future. Right leg wounds stable & improving, no debridement. Will apply dressing & compression wrap as ordered. Reinforced importance of elevation & compression to help with circulation, edema control & wound healing. F/u in [facility] in 1 week, pt. Aware to sooner with any problems or concerns. Discharge instructions reviewed with patient, all questions answered, copy given to patient. Dressings were applied to all wounds per M.D. Instructions at this visit.

## 2020-10-23 NOTE — TELEPHONE ENCOUNTER
Medication:   Requested Prescriptions     Pending Prescriptions Disp Refills    blood glucose test strips (FREESTYLE LITE) strip [Pharmacy Med Name: FREESTYLE LITE TEST STRIP] 200 strip 10     Sig: USE TO CHECK BLOOD GLUCOSE LEVELS FOUR TIMES DAILY         Last appt: 9/22/2020   Next appt: 11/19/2020    Last Labs DM:   Lab Results   Component Value Date    LABA1C 7.2 06/03/2020

## 2020-10-24 PROBLEM — E11.621 DIABETIC ULCER OF RIGHT FOOT ASSOCIATED WITH TYPE 2 DIABETES MELLITUS, LIMITED TO BREAKDOWN OF SKIN (HCC): Status: RESOLVED | Noted: 2020-01-01 | Resolved: 2020-01-01

## 2020-10-24 PROBLEM — L97.511 DIABETIC ULCER OF RIGHT FOOT ASSOCIATED WITH TYPE 2 DIABETES MELLITUS, LIMITED TO BREAKDOWN OF SKIN (HCC): Status: RESOLVED | Noted: 2020-01-01 | Resolved: 2020-01-01

## 2020-10-24 NOTE — PROGRESS NOTES
[facility] Progress Note    Catalina Bautista     : 1965    DATE OF VISIT:  10/21/2020    Subjective:     Giles Coleman is a 62 y.o. female who has a venous ulcer located on the bilateral lower leg. Significant symptoms or pertinent wound history since last visit: did quite well this week, tolerated lower extremity compression, lost a bit more weight, no increase in SOB. No F/C/D, still some upper respiratory symptoms; still smoking. Additional ulcer(s) noted? no. Right diabetic foot ulcer healed quickly with drainage and debridement last week. Her current medication list consists of ARIPiprazole, Doxepin HCl, INSULIN SYRINGE .5CC/29G, Insulin Degludec, Insulin Pen Needle, Liraglutide, albuterol sulfate HFA, aspirin EC, atorvastatin, benztropine, blood glucose test strips, buprenorphine-naloxone, busPIRone, clopidogrel, ferrous sulfate, insulin aspart, lamoTRIgine, magnesium oxide, metOLazone, midodrine, pantoprazole, potassium chloride, sacubitril-valsartan, spironolactone, tiotropium, torsemide, and traZODone. Allergies: Lisinopril    Objective:     Vitals:    10/21/20 1008   BP: 121/75   Pulse: 117   Resp: 16   Temp: 97.9 °F (36.6 °C)   TempSrc: Oral   SpO2: 94%   Weight: 216 lb (98 kg)   Height: 5' 4\" (1.626 m)     AAOx3, overweight, chronically ill appearing, fatigued, NAD  Dopplerable distal pulses, feet warm, slow cap refill  Mild-mod BL LE edema  Right lateral forefoot ulcer thinly healed; tiny callus at left 3rd toe  No cellulitis, angitis, fluctuance, less allodynia on the right leg  Shaye-ulcer skin: indurated, pink, warm, dry and hyperkeratotic. Ulcer(s): right foot healed; left leg I think all healed, though still some dry, crusted areas; right leg ulcers still open, but smaller, mostly pink, more granular than fibrotic, less inflamed, a bit of fibrin. Photos also saved in electronic chart.     Today's Wound Measurements, per RN documentation:  Yanelis Barron Wound 10/14/20 #8 Right plantar foot, DFU, Scott 1, onset 10/14/2020-Wound Length (cm): 0 cm  Wound 09/30/20 #5, Right Lower Extremity CLUSTER, Venous, Partial Thickness, Onset 9/21/20-Wound Length (cm): 10 cm  [REMOVED] Wound 09/30/20 #6, Left pretib CLUSTER, Venous, Partial Thickness, Onset 9/21/20-Wound Length (cm): 0 cm  [REMOVED] Wound 09/30/20 #7, Left Posterior Leg CLUSTER, Venous, Partial Thickness, Onset 9/21/20-Wound Length (cm): 0 cm    [REMOVED] Wound 10/14/20 #8 Right plantar foot, DFU, Scott 1, onset 10/14/2020-Wound Width (cm): 0 cm  Wound 09/30/20 #5, Right Lower Extremity CLUSTER, Venous, Partial Thickness, Onset 9/21/20-Wound Width (cm): 8 cm  [REMOVED] Wound 09/30/20 #6, Left pretib CLUSTER, Venous, Partial Thickness, Onset 9/21/20-Wound Width (cm): 0 cm  [REMOVED] Wound 09/30/20 #7, Left Posterior Leg CLUSTER, Venous, Partial Thickness, Onset 9/21/20-Wound Width (cm): 0 cm    [REMOVED] Wound 10/14/20 #8 Right plantar foot, DFU, Scott 1, onset 10/14/2020-Wound Depth (cm): 0 cm  Wound 09/30/20 #5, Right Lower Extremity CLUSTER, Venous, Partial Thickness, Onset 9/21/20-Wound Depth (cm): 0.2 cm  [REMOVED] Wound 09/30/20 #6, Left pretib CLUSTER, Venous, Partial Thickness, Onset 9/21/20-Wound Depth (cm): 0 cm  [REMOVED] Wound 09/30/20 #7, Left Posterior Leg CLUSTER, Venous, Partial Thickness, Onset 9/21/20-Wound Depth (cm): 0 cm    Assessment:     Patient Active Problem List   Diagnosis Code    Type 2 diabetes mellitus with diabetic polyneuropathy, with long-term current use of insulin ([facility]) E11.42, Z79.4    Essential hypertension I10    CLINT (obstructive sleep apnea) G47.33    Tobacco abuse disorder Z72.0    GERD (gastroesophageal reflux disease) K21.9    Anxiety and depression F41.9, F32.9    Iron deficiency anemia D50.9    CAD (coronary artery disease) I25.10    Mitral valve regurgitation I34.0    COPD (chronic obstructive pulmonary disease) ([facility]) J44.9    Vitamin D deficiency E55.9    Dyslipidemia E78.5    Abel's esophagus K22.70    Viral hepatitis C B19.20    Pulmonary nodule R91.1    Class 2 severe obesity due to excess calories with serious comorbidity and body mass index (BMI) of 39.0 to 39.9 in adult ([facility]) E66.01, Z68.39    Bipolar disorder ([facility]) F31.9    Pulmonary hypertension ([facility]) I27.20    Bilateral lower extremity edema R60.0    Habitual self-excoriation F42.4    Ulcer of left lower leg, limited to breakdown of skin ([address] L97.921    Ulcer of right leg, limited to breakdown of skin ([address] L97.911    Arthritis M19.90    Cardiomyopathy ([facility]) I42.9    Chronic systolic heart failure ([facility]) I50.22    Peripheral venous insufficiency I87.2    Atherosclerosis of native artery of both lower extremities with bilateral ulceration of calves ([facility]) I70.232, I70.242    Morbid obesity with BMI of 40.0-44.9, adult ([facility]) E66.01, Z68.41    CKD (chronic kidney disease) stage 4, GFR 15-29 ml/min ([facility]) N18.4       Assessment of today's active condition(s): venous stasis, chronic skin changes, multifactorial leg edema, healing BL leg ulcers, tolerating weekly compression better than I thought she would (in terms of fluid movement). In need of long-term compression to try to prevent wound recurrences (and in need of new diabetic shoes and inserts to protect her from future foot ulcers). Factors contributing to occurrence and/or persistence of the chronic ulcer include edema, venous stasis, diabetes, decreased mobility, obesity and smoking. Medical necessity of today's visit is shown by the above documentation. Sharp debridement is not indicated today, based upon the exam findings in the wound(s) above. She does have an indication for a weekly compression wrap. Procedure note:     Consent obtained. Time out performed per [facility] policy.     Anesthetic  Anesthetic: 4% Lidocaine Cream     After cleansing of the wounds with saline and applying skin-care and/or dressing materials as listed below, Bilateral application of a multi-layer compression wrap was performed per physician order, to help manage edema, stasis dermatitis, and/or venous ulcers. The patient's level of pain during and after the procedure was monitored, and is noted in the wound documentation flowsheet. Discharge plan:     Treatment in the wound care center today, per RN documentation: Wound 09/30/20 #5, Right Lower Extremity CLUSTER, Venous, Partial Thickness, Onset 9/21/20-Dressing/Treatment: (aquaphor,mepilex,purachol,4x4,foam,myxd6ivha)  [REMOVED] Wound 09/30/20 #7, Left Posterior Leg CLUSTER, Venous, Partial Thickness, Onset 9/21/20-Dressing/Treatment: (triamcinlone,aquaphor,foam,comp2 lite,spandagrip). Leave primary dressing and multi-layer wrap(s) in place until the next appointment. She should call before her next visit if there is any significant pain, significant strike-through of drainage to the surface of the wrap, or any significant sense of the wrap sliding down more than an inch or so, bunching-up and abrading her skin. I reminded the patient of the importance of weight management and smoking cessation, if applicable; also encouraged ambulation as tolerated, additional lower extremity exercises as instructed in our education sheet, leg elevation when at rest, and compliance with any recommended dietary, diuretic and compression therapies. Discussed long-term options for compression -- Spandagrip is inexpensive and simple, but I don't think adequate compression for her; compression stockings are covered by her insurance, but I don't think will be easy for her. Velcro garments I think will be effective, but WILL entail an out-of-pocket cost; she indicated that she would find a way to pay for them. I showed her some examples today, and she likes the Atbrox calf compression wraps. I'll get some measurements, and give her some papers next week to help her order a pair online, plus some extra compression anklets.  I did remind her that her current 1/2 pack-per-day smoking habit costs about $100 a month, so the cost for two months of cigarettes is about the same as the cost of two compression wraps and a pack of extra anklets. Once I think her weekly wraps will be just about finished, I'll send her for diabetic shoes and inserts as well. Written discharge instructions given to patient. Follow up in 1 week.     Electronically signed by Ana Azevedo MD on 10/24/2020 at 2:01 PM.

## 2020-10-26 NOTE — TELEPHONE ENCOUNTER
----- Message from TRAY Balderrama CNP sent at 10/23/2020  4:42 PM EDT -----  Covering for [employer]  Blood work stable and fluid number improved. Continue current medications and follow up as planned. Thanks!

## 2020-10-28 NOTE — PROGRESS NOTES
Mepilex border to right plantar foot and Aquaphor/Triamcinalone, foam to dorsal and Compri 2 Lite to LLE

## 2020-10-30 NOTE — TELEPHONE ENCOUNTER
----- Message from TRAY Harper CNP sent at 10/30/2020 11:21 AM EDT -----  Kidney function actually improved, potassium stable at 3.6. Blood count down/a bit more anemic. BNP trending down however this was drawn at a different facility. Viewed CardioMEMS readings. Pressures (PAD) have been stable in the 25-26 range. Metolazone was increased back to 3 times weekly for weight gain and abdominal distention earlier this week. Would continue same diuretic regimen at this time. Repeat BNP and BMP at [facility] lab in 1 week.   Thank you Sven Nova

## 2020-10-30 NOTE — TELEPHONE ENCOUNTER
Created telephone encounter. Spoke with Kamari Lindsey relayed message per [employer] regarding labs. Pt verbalized understanding.

## 2020-10-30 NOTE — PROGRESS NOTES
[facility] Progress Note    Severiano Bautista     : 1965    DATE OF VISIT:  10/28/2020    Subjective:     Ama Pickett is a 62 y.o. female who has a venous and  lymphedema ulcer located on the bilateral lower leg. Significant symptoms or pertinent wound history since last visit: feeling ok overall. If all of her weights have been accurate, she lost a large amount of weight in the previous couple of weeks, but then gained a lot back this week, she thinks most noticeable in truncal swelling; legs doing ok, definitely less swollen than a few weeks ago. No F/C/D, ongoing cough and some exertional dyspnea, and she's still smoking. Additional ulcer(s) noted? no. Right 5th met head ulcer healed. Her current medication list consists of ARIPiprazole, Doxepin HCl, INSULIN SYRINGE .5CC/29G, Insulin Degludec, Insulin Pen Needle, Liraglutide, albuterol sulfate HFA, aspirin EC, atorvastatin, benztropine, blood glucose test strips, buprenorphine-naloxone, busPIRone, clopidogrel, ferrous sulfate, insulin aspart, lamoTRIgine, magnesium oxide, metOLazone, midodrine, pantoprazole, potassium chloride, sacubitril-valsartan, spironolactone, tiotropium, torsemide, and traZODone. Allergies: Lisinopril    Objective:     Vitals:    10/28/20 1039 10/28/20 1050   BP:  135/81   Pulse:  113   Resp: 20 20   Temp: 98.1 °F (36.7 °C) 98.1 °F (36.7 °C)   TempSrc:  Oral   Weight: 229 lb 9.6 oz (104.1 kg)    Height: 5' 4\" (1.626 m)      AAOx3, overweight, chronically ill appearing, fatigued, NAD  Dopplerable distal pulses, feet warm, slow cap refill  Mild-moderate BL LE edema  Healed ulcer at right 5th met head with no signs of infection, minimal callus  No cellulitis, angitis, fluctuance  Some mild patchy stasis erythema and hyperkeratosis / xerosis of the legs  Shaye-ulcer skin: indurated, pink, warm, dry and hyperkeratotic.   Ulcer(s): I think all delicately healed, just a few small areas of hyperkeratosis and crusted exudate, but nothing actively draining, no eschars. Photos also saved in electronic chart.     Today's Wound Measurements, per RN documentation:  [REMOVED] Wound 09/30/20 #5, Right Lower Extremity CLUSTER, Venous, Partial Thickness, Onset 9/21/20-Wound Length (cm): 0 cm    [REMOVED] Wound 09/30/20 #5, Right Lower Extremity CLUSTER, Venous, Partial Thickness, Onset 9/21/20-Wound Width (cm): 0 cm    [REMOVED] Wound 09/30/20 #5, Right Lower Extremity CLUSTER, Venous, Partial Thickness, Onset 9/21/20-Wound Depth (cm): 0 cm    Assessment:     Patient Active Problem List   Diagnosis Code    Type 2 diabetes mellitus with diabetic polyneuropathy, with long-term current use of insulin ([facility]) E11.42, Z79.4    Essential hypertension I10    CLINT (obstructive sleep apnea) G47.33    Tobacco abuse disorder Z72.0    GERD (gastroesophageal reflux disease) K21.9    Anxiety and depression F41.9, F32.9    Iron deficiency anemia D50.9    CAD (coronary artery disease) I25.10    Mitral valve regurgitation I34.0    COPD (chronic obstructive pulmonary disease) ([facility]) J44.9    Vitamin D deficiency E55.9    Dyslipidemia E78.5    Abel's esophagus K22.70    Viral hepatitis C B19.20    Pulmonary nodule R91.1    Class 2 severe obesity due to excess calories with serious comorbidity and body mass index (BMI) of 39.0 to 39.9 in adult ([facility]) E66.01, Z68.39    Bipolar disorder ([facility]) F31.9    Pulmonary hypertension ([facility]) I27.20    Bilateral lower extremity edema R60.0    Habitual self-excoriation F42.4    Ulcer of left lower leg, limited to breakdown of skin ([facility]) L97.921    Ulcer of right leg, limited to breakdown of skin ([address] L97.911    Arthritis M19.90    Cardiomyopathy ([facility]) I42.9    Chronic systolic heart failure ([facility]) I50.22    Peripheral venous insufficiency I87.2    Atherosclerosis of native artery of both lower extremities with bilateral ulceration of calves ([facility]) I70.232, I70.242    Morbid obesity with BMI of 40.0-44.9, adult ([facility]) E66.01, Z68.41    CKD (chronic kidney disease) stage 4, GFR 15-29 ml/min ([facility]) N18.4       Assessment of today's active condition(s): venous stasis, chronic skin changes, multifactorial LE edema, some concomitant but non-critical PAD, delicately healed venous leg ulcers, also a small but recurrent diabetic foot ulcer at 5th met head. Will need some long-term preventive care, when it comes to compression garments and offloading shoes and inserts, in addition to trying to continue to work on her overall health, weight, fluid balance, smoking. Factors contributing to occurrence and/or persistence of the chronic ulcer include edema, venous stasis, decreased mobility, obesity and smoking. Medical necessity of today's visit is shown by the above documentation. Sharp debridement is not indicated today, based upon the exam findings in the wound(s) above. She does have an indication for a weekly compression wrap. Procedure note:     Consent obtained. Time out performed per [facility]. Anesthetic  Anesthetic: None     After cleansing of the wounds with saline and applying skin-care and/or dressing materials as listed below, Bilateral application of a multi-layer compression wrap was performed per physician order, to help manage edema, stasis dermatitis, and/or venous ulcers. The patient's level of pain during and after the procedure was monitored, and is noted in the wound documentation flowsheet. Discharge plan:     Treatment in the wound care center today, per RN documentation: [REMOVED] Wound 09/30/20 #5, Right Lower Extremity CLUSTER, Venous, Partial Thickness, Onset 9/21/20-Dressing/Treatment: Other (comment)(Aquaphor/Triamcinalone, Foam to Dorsal, Compri 2 Lite). Similar skin products and wrap applied to the left side as well. Leave primary dressing and multi-layer wrap(s) in place until the next appointment.  She should call before her next visit if there is any significant pain, significant strike-through of drainage to the surface of the wrap, or any significant sense of the wrap sliding down more than an inch or so, bunching-up and abrading her skin. I reminded the patient of the importance of weight management and smoking cessation, if applicable; also encouraged ambulation as tolerated, additional lower extremity exercises as instructed in our education sheet, leg elevation when at rest, and compliance with any recommended dietary, diuretic and compression therapies. Need to keep her in weekly compression wraps until she has her own garments for home. We had discussed Juzo compression wraps last week, and I gave her the papers today to help her order garments, and extra compression anklets. She thinks she might be able to have one purchased by next week, the other the week after. Once she's in her daily compression garments, I can send her to a local  for diabetic shoes and inserts as well. Written discharge instructions given to patient. Follow up in 1 week.     Electronically signed by Vanessa Larson MD on 10/30/2020 at 9:48 AM.

## 2020-11-05 NOTE — PROGRESS NOTES
[address]   Cardiac Evaluation    Primary Care Doctor:  Deena Zimmerman    Chief Complaint   Patient presents with    1 Month Follow-Up     no cardiac complaints        History of Present Illness:   I had the pleasure of seeing Leandra Simon in follow up for CAD s/p PCI to LAD and LCx 2018, CABG X1 (LIMA to LAD,Jan 2020 for restenosis of LAD and LCx and significant dz in RCA). Hx of ICM, sCHF s/p CardioMEMs implant (2/2019), MR, PAD, CKD, DM, COPD, leg wounds     The metolazone has been adjusted up and down for weight and swelling. Recent blood work on 10/27/2020 showed stable renal panel and BNP. She is feeling poorly, has had cough and cold for 6 weeks. Been treated with antibiotics and steroid, cough medicines without improvement. No fevers at all. TEsted negative for COVID. She has not had anything to eat or drink to cause weight gain. She is having lots of leg and abdominal swelling. Appetite is good but nothing tastes good. Slept good last night due to taking medicines. Usually is not good, chronic. Weight at home:   11/5/20 236 lbs   11/4/20 230 lbs  Baseline weight felt to be 218 lbs      Leandra Simon describes symptoms including dyspnea, orthopnea, fatigue, edema, abdominal distention but denies chest pain, palpitations, PND, early saiety, syncope.            NYHA:   III  ACC/ AHA Stage:    C    Past Medical History:   has a past medical history of Acute blood loss anemia, Acute kidney injury ([address] Acute kidney injury superimposed on CKD ([address] Acute on chronic combined systolic and diastolic congestive heart failure ([address] Acute on chronic right-sided heart failure ([address] Alcohol dependence ([address] Cellulitis, Diabetic ulcer of left foot associated with type 2 diabetes mellitus, limited to breakdown of skin ([address] Diabetic ulcer of toe of right foot associated with type 2 diabetes mellitus ([address] Left renal artery stenosis ([address] MI (myocardial infarction) ([address] CNP   sacubitril-valsartan (ENTRESTO) 49-51 MG per tablet Take by mouth Half tablet   Yes Historical Provider, MD   metOLazone (ZAROXOLYN) 2.5 MG tablet Take 1 tablet by mouth Twice a Week [facility]  Patient taking differently: Take 2.5 mg by mouth three times a week [facility] 10/19/20  Yes TRAY Harper CNP   tiotropium (SPIRIVA RESPIMAT) 2.5 MCG/ACT AERS inhaler INHALE 2 PUFFS INTO THE LUNGS DAILY 10/13/20  Yes Hodan Castro MD   Insulin Degludec (TRESIBA FLEXTOUCH) 100 UNIT/ML SOPN Inject 30 Units into the skin nightly 9/22/20  Yes TRAY Torres CNP   Liraglutide (VICTOZA) 18 MG/3ML SOPN SC injection Inject 1.2 mg into the skin daily 9/22/20  Yes TRAY Torres CNP   Insulin Pen Needle (B-D ULTRAFINE III SHORT PEN) 31G X 8 MM MISC Five shots a day 9/22/20  Yes TRAY Torres CNP   spironolactone (ALDACTONE) 50 MG tablet Take 1 tablet by mouth daily 9/10/20  Yes TRAY Harper CNP   potassium chloride (KLOR-CON M) 20 MEQ extended release tablet Take 2 tablets by mouth 3 times daily  Patient taking differently: Take 40 mEq by mouth 4 times daily  9/2/20  Yes TRAY Harper CNP   midodrine (PROAMATINE) 10 MG tablet Take 1 tablet by mouth 3 times daily (with meals) 9/2/20  Yes Benny Perry MD   insulin aspart (NOVOLOG FLEXPEN) 100 UNIT/ML injection pen Inject 10 Units into the skin 3 times daily (before meals) 8/14/20  Yes TRAY Torres CNP   traZODone (DESYREL) 100 MG tablet Take 100 mg by mouth nightly as needed for Sleep Can take 1-2 tabs nightly   Yes Historical Provider, MD   DOXEPIN HCL PO Take 1 capsule by mouth nightly as needed Patient unsure of exact dosage   Yes Historical Provider, MD   albuterol sulfate  (90 Base) MCG/ACT inhaler Inhale 2 puffs into the lungs every 6 hours as needed for Wheezing or Shortness of Breath 7/3/20  Yes Sagrario Suero MD   atorvastatin (LIPITOR) 80 MG tablet Take 1 tablet by mouth nightly 2/28/20  Yes Jen Rodriguez, TRAY - CNP buprenorphine-naloxone (SUBOXONE) 8-2 MG FILM SL film Place 1 Film under the tongue 2 times daily. Yes Historical Provider, MD   benztropine (COGENTIN) 1 MG tablet Take 1 mg by mouth nightly  12/13/19  Yes Historical Provider, MD   INSULIN SYRINGE .5CC/29G 29G X 1/2\" 0.5 ML MISC 1 each by Does not apply route daily 12/3/19  Yes TRAY Joshua - CNP   pantoprazole (PROTONIX) 40 MG tablet Take 1 tablet by mouth 2 times daily 10/1/19  Yes Carter Osborne MD   clopidogrel (PLAVIX) 75 MG tablet TAKE 1 TABLET BY MOUTH EVERY DAY 9/3/19  Yes Evelia Olivas APRN - CNP   magnesium oxide (MAG-OX) 400 (240 Mg) MG tablet TAKE 1 TABLET ONCE DAILY 5/1/19  Yes Edgar May MD   busPIRone (BUSPAR) 30 MG tablet Take 30 mg by mouth 2 times daily  4/2/18  Yes Historical Provider, MD   aspirin EC 81 MG EC tablet Take 1 tablet by mouth daily 1/8/16  Yes Claudette Wong MD   ARIPiprazole (ABILIFY) 10 MG tablet Take 10 mg by mouth daily    Yes Historical Provider, MD   lamoTRIgine (LAMICTAL) 150 MG tablet Take 200 mg by mouth 2 times daily    Yes Historical Provider, MD   ferrous sulfate (IRON 325) 325 (65 Fe) MG tablet Take 1 tablet by mouth 2 times daily (with meals) 8/12/20 10/28/20  Kimberly Ortiz MD        Allergies:  Lisinopril     Review of Systems:   Review of Systems   Constitutional: Positive for activity change, appetite change and fatigue. Negative for diaphoresis and fever. HENT: Positive for congestion, postnasal drip and sinus pressure. Negative for hearing loss. Respiratory: Positive for cough and shortness of breath. Negative for wheezing. Cardiovascular: Positive for palpitations and leg swelling. Negative for chest pain. Gastrointestinal: Positive for abdominal distention. Musculoskeletal: Positive for arthralgias and myalgias. Negative for gait problem. Skin: Positive for wound. Neurological: Positive for weakness and numbness.  Negative for dizziness, syncope, facial asymmetry and light-headedness. Hematological: Bruises/bleeds easily. Psychiatric/Behavioral: Positive for sleep disturbance. Physical Examination:    Vitals:    11/05/20 1430 11/05/20 1436   BP: 130/88 130/82   Pulse: 112    SpO2: 96%    Weight: 236 lb (107 kg)    Height: 5' 4\" (1.626 m)         Constitutional and General Appearance: Warm and dry, no apparent distress, normal coloration  HEENT:  Normocephalic, atraumatic  Respiratory:  · Normal excursion and expansion without use of accessory muscles  · Resp Auscultation: Bibasilar rales   Cardiovascular:  · The apical impulses not displaced  · Heart tones are crisp and normal  · JVP 10 cm H2O  · Regular rate and rhythm, normal S1S2, no m/g/r  · Peripheral pulses are symmetrical and full  · There is no clubbing, cyanosis of the extremities.   · 2+ BLE edema  · Pedal Pulses: 2+ and equal   Abdomen:  · No masses or tenderness; + firmness to right posterior abdomen/ flank area  · Liver/Spleen: No Abnormalities Noted  Neurological/Psychiatric:  · Alert and oriented in all spheres  · Moves all extremities well  · Exhibits normal gait balance and coordination  · No abnormalities of mood, affect, memory, mentation, or behavior are noted    Lab Data:  Most recent lab results below reviewed in office    CBC:   Lab Results   Component Value Date    WBC 7.9 10/27/2020    WBC 7.4 10/14/2020    WBC 5.9 08/31/2020    RBC 4.27 10/27/2020    RBC 4.75 10/14/2020    RBC 3.96 08/31/2020    HGB 11.9 10/27/2020    HGB 13.5 10/14/2020    HGB 10.6 08/31/2020    HCT 37.5 10/27/2020    HCT 41.3 10/14/2020    HCT 33.3 08/31/2020    MCV 88 10/27/2020    MCV 87.0 10/14/2020    MCV 84.1 08/31/2020    RDW 19.6 10/14/2020    RDW 25.5 08/31/2020    RDW 26.5 08/28/2020     10/27/2020     10/14/2020     08/31/2020     BMP:  Lab Results   Component Value Date     10/27/2020     10/23/2020     10/14/2020    K 3.6 10/27/2020    K 3.7 10/23/2020    K 2.9 10/14/2020 K 2.8 08/18/2020    K 3.5 08/05/2020    K 3.3 08/05/2020    CL 92 10/27/2020    CL 86 10/23/2020    CL 84 10/14/2020    CO2 24 10/27/2020    CO2 33 10/23/2020    CO2 34 10/14/2020    PHOS 3.0 09/02/2020    PHOS 3.1 09/01/2020    PHOS 4.0 08/31/2020    BUN 53 10/27/2020    BUN 72 10/23/2020    BUN 72 10/14/2020    CREATININE 1.56 10/27/2020    CREATININE 1.7 10/23/2020    CREATININE 1.6 10/14/2020     BNP:   Lab Results   Component Value Date    PROBNP 2,366 10/23/2020    PROBNP 5,014 09/05/2020    PROBNP 3,544 09/02/2020     LIPID:   Lab Results   Component Value Date    TRIG 78 01/27/2020    TRIG 140 01/11/2020    HDL 21 01/27/2020    HDL 27 01/11/2020    [address] 21 01/27/2020    LDLCALC 103 01/11/2020    LDLDIRECT 187 08/01/2017       Cardiac Imaging:  VASCULAR PROCEDURE:   DATE OF PROCEDURE:  09/01/2020   PREOPERATIVE DIAGNOSES:  Left subclavian artery stenosis with coronary steal syndrome.   POSTOPERATIVE DIAGNOSES:  Left subclavian artery stenosis with coronary steal syndrome.   PROCEDURES PERFORMED:  1. Selective left subclavian angiogram.  2.  Placement of catheter in the subclavian artery. 3.  Angioplasty and stenting of the subclavian artery.   4.  Left upper extremity angiogram.       CARDIAC CATH 8/27/20:   FINDINGS   HEMODYNAMICS   CHAMBER PRESSURE SATURATION   RA  15  54 %   RV  62/16     PA  69/20  51 %   PW  19 with V waves to 25     AORTA  118/63  94 %      HERMILA CARDIAC OUTPUT  5.4 L/min   SVR  1075 L/min   PVR  343 L/min      Left subclavian angiogram shows proximal 95 to 99% stenosis.  There appears to be retrograde filling of the left subclavian artery by way of the LIMA and there appears to be coronary steal.  The vertebral arteries not well visualized.     Left heart catheterization   LVEDP  22   GRADIENT ACROSS AORTIC VALVE  none      CORONARY ARTERIES   LM  Proximal less than 10% stenosis, mid 10 to 20% stenosis, distal 70% stenosis.     Overall similar appearance to prior angiograms       liver may be related to previous cholecystectomy or other biliary procedure. Kidneys symmetric in size. Left renal hilar calcifications are favored to be vascular. GI/Bowel: Moderate to large amount retained stool throughout the colon. Appendix is unremarkable. No evidence of bowel obstruction. Pelvis: Bladder is mildly distended. Uterus unremarkable. No adnexal mass. Peritoneum/Retroperitoneum: Moderate severe aortic vascular calcifications. Aorta is nonaneurysmal.  No free air or free fluid. Bones/Soft Tissues: Severe multilevel facet arthropathy     BLE ZACH's 4/18/19:   1. There is severe arterial insufficiency at rest involving the right lower    extremity. There occlusive disease of the right proximal superficial femoral    (noted stent) and mid posterior tibial arteries. There is a 50-74% stenosis    at 4the right deep femoral artery with evidence of inflow disease.    2. There is severe arterial insufficiency at rest involving the left lower    extremity. There is occlusive disease of the left proximal superficial    femoral artery (noted stent). There is a 30-49% stenosis at the left deep    femoral artery with evidence of inflow disease.         ECHO 4/3/19:   Tasneem Lento systolic function is mildly reduced with EF estimated at 40-45%.   There is severe HK of the apex and apical-septal segment.   Normal left ventricular diastolic filling pressure.   Mild mitral regurgitation.   Systolic pulmonary artery pressure (SPAP) estimated at 32mmHg (RA pressure 3mmHg). Arterial doppler studies 8/2/18:   Janina Contes is no arterial insufficiency at rest involving the lower extremities    bilaterally, however, there is evidence to suggest calf vessel disease    bilaterally. [facility] 3/26/18 [employer] promus premier [id number] CCx and [id number] CCx. Echo: 5/23/18:    Ejection fraction is visually estimated to be 30-35 %. There is akinesis of the apex and inferior walls.    Left ventricular size is on anticoagulation:  NA

## 2020-11-06 NOTE — TELEPHONE ENCOUNTER
Created telephone encounter. Per Pt HIPAA from can leave results on machine. LMOM relaying message per [employer]. Pt to call the office with any concerns.

## 2020-11-06 NOTE — TELEPHONE ENCOUNTER
----- Message from Worth Leyden, APRN - CNP sent at 11/6/2020  1:30 PM EST -----  BNP elevated as expected. Kidney panel and electrolytes stable. Continue with plan of increase diuretics as discussed in office yesterday. Continue current dose of potassium.   Repeat BMP in 1 week thank you  Jed Pineda

## 2020-11-11 NOTE — FLOWSHEET NOTE
Polysporin, adaptic, foam to dorsal ankles/feet, compri2 lite, and spandagrip to bilateral lower legs.

## 2020-11-11 NOTE — PLAN OF CARE
Pt. Returning to [facility] today after missing her visit last week d/t being out of town. Pt. States she has lost the paperwork to order compression garments & just now has the money to be able to order the garments this week. Dr Junito Lyn provided the paperwork again & pt. To order compression garments this week. Edema noted to be increased, especially in right leg, also a few fragile weeping areas noted. Will reapply dressings with compression wraps as ordered. Reinforced importance of exercise, elevation & compression to help with circulation, edema control & wound healing. F/u in [facility] in 1 week as ordered. Discharge instructions reviewed with patient, all questions answered, copy given to patient. Dressings were applied to all wounds per M.D. Instructions at this visit.

## 2020-11-16 NOTE — PROGRESS NOTES
[facility] Progress Note    Kayliesindi Bautista     : 1965    DATE OF VISIT:  2020    Subjective:     Michael Lopez is a 62 y.o. female who has a venous and  lymphedema ulcer located on the bilateral lower leg. Significant symptoms or pertinent wound history since last visit: Mrs. Moody Hunt hasn't been here in a couple of weeks; was out of town for a short time, eventually had to remove her wraps, so swelling increased, a few small areas of ulceration recurred, and she's not purchased the Velcro garments that we discussed the last time she was here. Usual HIGHTOWER, still smoking, weight is up and down, no fever. Additional ulcer(s) noted? no.  Right foot remains healed, just a thin callus at the 5th met head. Her current medication list consists of ARIPiprazole, Doxepin HCl, INSULIN SYRINGE .5CC/29G, Insulin Degludec, Insulin Pen Needle, Liraglutide, albuterol sulfate HFA, aspirin EC, atorvastatin, benztropine, blood glucose test strips, buprenorphine-naloxone, busPIRone, clopidogrel, insulin aspart, lamoTRIgine, magnesium oxide, metOLazone, midodrine, pantoprazole, potassium chloride, sacubitril-valsartan, spironolactone, tiotropium, torsemide, and traZODone. Allergies: Lisinopril    Objective:     Vitals:    20 1013   BP: 121/80   Pulse: 102   Resp: 22   Temp: 97.8 °F (36.6 °C)   TempSrc: Oral   Weight: 231 lb 3.2 oz (104.9 kg)   Height: 5' 4.5\" (1.638 m)     AAOx3, overweight, chronically ill appearing, NAD  Intact distal pulses, feet warm, slow cap refill  Moderate BL LE stage 2 lymphedema  Usual venous stasis erythema and mild dermatitis, some xerosis and hyperkeratosis  Small and slightly tender but stable callus at 5th met head  No cellulitis, angitis, fluctuance, no real allodynia  Shaye-ulcer skin: indurated, pink, erythematous , warm, dry and hyperkeratotic.   Ulcer(s): a number of very small, partial thickness ulcerations, mostly dry, a bit of fibrin, some crusted exudate and minor epidermal necrosis, no real depth, no signs of infection. Photos also saved in electronic chart.     Assessment:     Patient Active Problem List   Diagnosis Code    Type 2 diabetes mellitus with diabetic polyneuropathy, with long-term current use of insulin ([facility]) E11.42, Z79.4    Essential hypertension I10    CLINT (obstructive sleep apnea) G47.33    Tobacco abuse disorder Z72.0    GERD (gastroesophageal reflux disease) K21.9    Anxiety and depression F41.9, F32.9    Iron deficiency anemia D50.9    CAD (coronary artery disease) I25.10    Mitral valve regurgitation I34.0    COPD (chronic obstructive pulmonary disease) ([facility]) J44.9    Vitamin D deficiency E55.9    Dyslipidemia E78.5    Abel's esophagus K22.70    Viral hepatitis C B19.20    Pulmonary nodule R91.1    Class 2 severe obesity due to excess calories with serious comorbidity and body mass index (BMI) of 39.0 to 39.9 in adult ([facility]) E66.01, Z68.39    Bipolar disorder ([facility]) F31.9    Pulmonary hypertension ([facility]) I27.20    Bilateral lower extremity edema R60.0    Habitual self-excoriation F42.4    Ulcer of left lower leg, limited to breakdown of skin ([facility]) L97.921    Ulcer of right leg, limited to breakdown of skin ([address] L97.911    Arthritis M19.90    Cardiomyopathy ([facility]) I42.9    Chronic systolic heart failure ([facility]) I50.22    Peripheral venous insufficiency I87.2    Atherosclerosis of native artery of both lower extremities with bilateral ulceration of calves ([facility]) I70.232, I70.242    Morbid obesity with BMI of 40.0-44.9, adult ([facility]) E66.01, Z68.41    CKD (chronic kidney disease) stage 4, GFR 15-29 ml/min ([facility]) N18.4       Assessment of today's active condition(s): venous stasis, chronic skin changes, multifactorial LE edema, small scattered partial thickness leg ulcers; hopefully we can get her compressed again, get her skin healed, and then get her transitioned into long-term compression garments, without increasing any sense of abd bloating, dyspnea, etc. Factors contributing to occurrence and/or persistence of the chronic ulcer include venous stasis, lymphedema, diabetes, decreased mobility, obesity, smoking and non-adherence. Medical necessity of today's visit is shown by the above documentation. Sharp debridement is not indicated today, based upon the exam findings in the wound(s) above. She does have an indication for a weekly compression wrap. Procedure note:     Consent obtained. Time out performed per [facility]. Anesthetic  Anesthetic: 4% Lidocaine Cream     After cleansing of the wounds with saline and applying skin-care and/or dressing materials as listed below, Bilateral application of a multi-layer compression wrap was performed per physician order, to help manage edema, stasis dermatitis, and/or venous ulcers. The patient's level of pain during and after the procedure was monitored, and is noted in the wound documentation flowsheet. Discharge plan:     Treatment in the wound care center today, per RN documentation: to any small open or crusted areas, just polysporin and small pieces of Adaptic. Leave primary dressing and multi-layer wrap(s) in place until the next appointment. She should call before her next visit if there is any significant pain, significant strike-through of drainage to the surface of the wrap, or any significant sense of the wrap sliding down more than an inch or so, bunching-up and abrading her skin. I reminded the patient of the importance of weight management and smoking cessation, if applicable; also encouraged ambulation as tolerated, additional lower extremity exercises as instructed in our education sheet, leg elevation when at rest, and compliance with any recommended dietary, diuretic and compression therapies.  I gave her another copy of the papers that she needs to order a pair of Juzo calf compression wraps online, plus an extra pair or two of compression anklets to go along with them. Hoping that she can get into them in about 2 weeks. Once into daily compression, I can get her set up for diabetic shoes and custom orthotics as well. Written discharge instructions given to patient. Follow up in 1 week.     Electronically signed by Sally Leggett MD on 11/15/2020 at 9:32 PM.

## 2020-11-16 NOTE — TELEPHONE ENCOUNTER
Spoke to patient. She did not weigh herself this am.  Main complaint is shortness of breath. Reviewed CardioMEMs readings, last 2 are dampened. Last reliable reading on 11/12/20 PAD = 24. She is currently taking metolazone QOD,. Torsemide 100 mg daily and spironolactone 50 mg nightly. Recommended:   1. Weigh today and tomorrow AM  2. Have blood work today  3. Repeat CardioMEMs transmission. Will discuss in AM regarding treatment. She voiced understanding.    Torin Ngo, TRAY - CNP

## 2020-11-16 NOTE — TELEPHONE ENCOUNTER
Pt sts she holding excessive amount  of fluid today. She does not want to go to ED. Pt wants to know if she can go to [facility]  to get Lasix drip. Please advise.

## 2020-11-17 NOTE — TELEPHONE ENCOUNTER
Spoke with Tracey Hernandez, relayed message per [employer]. Pt v/u and will have labs done Monday. She says today's weight is 231 lb.

## 2020-11-17 NOTE — TELEPHONE ENCOUNTER
----- Message from TRAY Montilla CNP sent at 11/17/2020  8:42 AM EST -----  Kidney function better. Potassium lower. Find out what her weight is today. Her CardioMEMS readings are still flat/dampened. Lets have her take her metolazone on a daily basis in addition to her torsemide 100 mg once daily and spironolactone 50 mg once daily. Have her take 2 additional potassium pills. Repeat BMP and BNP on Monday.   Thank you, Jono Baer

## 2020-11-17 NOTE — TELEPHONE ENCOUNTER
Patient called stating her weight last night was 231 pounds. Patient states weight gain of 10 pounds in the last two weeks.  Patient states SOB and cough

## 2020-11-20 NOTE — TELEPHONE ENCOUNTER
[employer] Pt  Last office visit 6/5/2019  Plan:   1. Stay on the metolazone 5 mg daily, torsemide 50 mg daily and spironolactone 100 mg twice daily  2. Continue the potassium 2 tabs 3 times per day  3. No change in the carvedilol or Entresto  4. Follow up with me in 1 month   5. Will plan for you to see Dr. Wendy Gaxiola as your cardiologist in future. You do not see Dr. Stephany Gordon or Dr. Marco A Wiseman in the office again. 6. Will send After Visit Summary from hospitalization to home care.   They said can get out to see you today.     No complaints

## 2020-11-30 PROBLEM — E87.6 HYPOKALEMIA: Status: ACTIVE | Noted: 2020-01-01

## 2020-11-30 NOTE — ED PROVIDER NOTES
[address] ED      CHIEF COMPLAINT  Chest Pain (x 9 weeks could not take the pain any longer. Pt seen by cardiologist today and sent to ER) and Cough (x 9 weeks Pt has taken 3 rounds of atb and steroids without success)       HISTORY OF PRESENT ILLNESS  Rupal Eng is a 60 y.o. female  who presents to the ED complaining of cough, chest pain with coughing. Patient states that she has had a cough for the past 9 weeks, his had 3 courses of antibiotics and steroids and feels that she is not getting any better. She saw her cardiologist earlier today who advised her to the ER for further evaluation. She states that she has pain only when she coughs. She denies any new leg pain or leg swelling. She does have a history of CHF. She denies any associated fevers, nausea vomiting, abdominal pain, diarrhea or constipation, or other complaints. No other complaints, modifying factors or associated symptoms. I have reviewed the following from the nursing documentation.     Past Medical History:   Diagnosis Date    Acute blood loss anemia 8/5/2020    Acute kidney injury ([address] 12/25/2019    Acute kidney injury superimposed on CKD ([address] 8/5/2020    Acute on chronic combined systolic and diastolic congestive heart failure ([address] 1/10/2020    Acute on chronic right-sided heart failure ([address] 08/2020    Alcohol dependence ([address] 3/10/2010    Cellulitis 6/3/2020    Diabetic ulcer of left foot associated with type 2 diabetes mellitus, limited to breakdown of skin ([address] 1/18/2020    Diabetic ulcer of toe of right foot associated with type 2 diabetes mellitus ([address] 1/18/2020    Left renal artery stenosis ([address] 08/2020    MI (myocardial infarction) ([address] 10/07/2019    NSTEMI    Positive FIT (fecal immunochemical test) 2/28/2020    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 Esther Rogers - at [address]  01/20/2020    Dr. Félix Yu      pancreatitis post surgery    CORONARY ANGIOPLASTY WITH STENT PLACEMENT  03/16/2018    MELODY- 3.5 x 38 and 3.5 x 20 to Cx    CORONARY ANGIOPLASTY WITH STENT PLACEMENT  01/03/2018    MELODY- 3.0 x 38 and 2.75 x 26 and 2.75 x 8 and 2.75 x 22 to the LAD    CORONARY ANGIOPLASTY WITH STENT PLACEMENT  10/07/2019    MELODY- 2.5 x 8 to [address] GRAFT N/A 1/27/2020    CORONARY ARTERY BYPASS GRAFTING X 1, ON PUMP BEATING HEART, INTERNAL MAMMARY ARTERY TO LEFT ANTERIOR DESCENDING ARTERY, VAS CATH INSERTION, URI, PLATELET GEL APPLICATION, 5 LEVEL BILATERAL INTERCOSTAL NERVE BLOCK, STERNAL PLATING performed by Ok Lay MD at [address] Right 5/16/2019    fem-pop, performed by Jordan Cassidy MD at [address]  02/14/2019    CardioMEMs insertion for CHF    ROTATOR CUFF REPAIR Left 04/22/2016    TONSILLECTOMY      TRANSESOPHAGEAL ECHOCARDIOGRAM  01/26/2020    TRANSLUMINAL ANGIOPLASTY Left 10/08/2019    with stenting, at [facility]    [address] Left 04/29/2020    of the [facility] re-occlusion    TUMOR EXCISION      benign tumors X 2 chest & axilla    UPPER GASTROINTESTINAL ENDOSCOPY  4/25/2013    BX barretts, HH, gastritis    UPPER GASTROINTESTINAL ENDOSCOPY  12/10/2015    UPPER GASTROINTESTINAL ENDOSCOPY  01/06/2017    Gastritis    VASCULAR SURGERY Left 12/08/2015    upper extremity and mesenteric angiogram (diagnostic only)    VASCULAR SURGERY Bilateral 07/07/2020    diagnostic lower extremity angiogram only    VASCULAR SURGERY Left 08/04/2020    angioplasty and stent of renal artery    VASCULAR SURGERY Left 08/05/2020    coil embolization of branch renal artery vessel for bleeding    VASCULAR SURGERY Left 09/01/2020    angioplasty and stenting of subclavian artery     Family History   Problem Relation Age of Onset    Heart Disease Maternal Grandmother     Cancer Mother         lung and brain cancer    Cancer Maternal Aunt         lung and brain cancer     Social History     Socioeconomic History    Marital status: Single     Spouse name: Not on file    Number of children: Not on file    Years of education: Not on file    Highest education level: Not on file   Occupational History    Not on file   Social Needs    Financial resource strain: Not on file    Food insecurity     Worry: Not on file     Inability: Not on file    Transportation needs     Medical: Not on file     Non-medical: Not on file   Tobacco Use    Smoking status: Current Every Day Smoker     Packs/day: 0.50     Years: 30.00     Pack years: 15.00     Types: Cigarettes    Smokeless tobacco: Never Used   Substance and Sexual Activity    Alcohol use: No     Comment: quit 2006     Drug use: Never    Sexual activity: Yes     Partners: Male   Lifestyle    Physical activity     Days per week: Not on file     Minutes per session: Not on file    Stress: Not on file   Relationships    Social connections     Talks on phone: Not on file     Gets together: Not on file     Attends Muslim service: Not on file     Active member of club or organization: Not on file     Attends meetings of clubs or organizations: Not on file     Relationship status: Not on file    Intimate partner violence     Fear of current or ex partner: Not on file     Emotionally abused: Not on file     Physically abused: Not on file     Forced sexual activity: Not on file   Other Topics Concern    Not on file   Social History Narrative    Not on file     Current Facility-Administered Medications   Medication Dose Route Frequency Provider Last Rate Last Dose    azithromycin (ZITHROMAX) 500 mg in D5W 250ml addavial 500 mg Intravenous Once Farhana Clayton  mL/hr at 11/30/20 1306 500 mg at 11/30/20 1306    potassium chloride 10 mEq/100 mL IVPB (Peripheral Line)  10 mEq Intravenous Once Farhana Clayton MD         Current Outpatient Medications   Medication Sig Dispense Refill    Blood Glucose Monitoring Suppl (FREESTYLE FREEDOM LITE) w/Device KIT Use to test glucose 4 times a day 1 kit 0    blood glucose test strips (FREESTYLE LITE) strip USE TO CHECK BLOOD GLUCOSE LEVELS FOUR TIMES DAILY 200 strip 10    torsemide (DEMADEX) 100 MG tablet Take 1 tablet by mouth daily 90 tablet 1    sacubitril-valsartan (ENTRESTO) 49-51 MG per tablet Take by mouth Half tablet      metOLazone (ZAROXOLYN) 2.5 MG tablet Take 1 tablet by mouth Twice a Week MWF (Patient taking differently: Take 2.5 mg by mouth three times a week MWF) 30 tablet 0    tiotropium (SPIRIVA RESPIMAT) 2.5 MCG/ACT AERS inhaler INHALE 2 PUFFS INTO THE LUNGS DAILY 1 Inhaler 6    Insulin Degludec (TRESIBA FLEXTOUCH) 100 UNIT/ML SOPN Inject 30 Units into the skin nightly 10 pen 5    Liraglutide (VICTOZA) 18 MG/3ML SOPN SC injection Inject 1.2 mg into the skin daily 2 pen 3    Insulin Pen Needle (B-D ULTRAFINE III SHORT PEN) 31G X 8 MM MISC Five shots a day 200 each 2    spironolactone (ALDACTONE) 50 MG tablet Take 1 tablet by mouth daily 30 tablet 5    potassium chloride (KLOR-CON M) 20 MEQ extended release tablet Take 2 tablets by mouth 3 times daily (Patient taking differently: Take 40 mEq by mouth 4 times daily ) 180 tablet 3    midodrine (PROAMATINE) 10 MG tablet Take 1 tablet by mouth 3 times daily (with meals) 90 tablet 3    insulin aspart (NOVOLOG FLEXPEN) 100 UNIT/ML injection pen Inject 10 Units into the skin 3 times daily (before meals) 5 pen 3    traZODone (DESYREL) 100 MG tablet Take 100 mg by mouth nightly as needed for Sleep Can take 1-2 tabs nightly      DOXEPIN HCL PO Take 1 capsule by mouth nightly as needed Patient unsure of exact dosage extremities. All extremities neurovascularly intact. SKIN: Warm and dry. No acute rashes. Excoriations on bilateral upper extremities with no evidence of infection. NEUROLOGICAL: Alert and oriented. CN's 2-12 intact. No gross facial drooping. Strength 5/5, sensation intact. PSYCHIATRIC: Normal mood and affect. LABS  I have reviewed all labs for this visit.    Results for orders placed or performed during the hospital encounter of 68/31/83   Basic Metabolic Panel   Result Value Ref Range    Sodium 127 (L) 136 - 145 mmol/L    Potassium 2.3 (LL) 3.5 - 5.1 mmol/L    Chloride 76 (L) 99 - 110 mmol/L    CO2 38 (H) 21 - 32 mmol/L    Anion Gap 13 3 - 16    Glucose 335 (H) 70 - 99 mg/dL    BUN 63 (H) 7 - 20 mg/dL    CREATININE 1.9 (H) 0.6 - 1.1 mg/dL    GFR Non-African American 27 (A) >60    GFR  33 (A) >60    Calcium 9.7 8.3 - 10.6 mg/dL   Troponin   Result Value Ref Range    Troponin 0.03 (H) <0.01 ng/mL   CBC Auto Differential   Result Value Ref Range    WBC 15.4 (H) 4.0 - 11.0 K/uL    RBC 5.80 (H) 4.00 - 5.20 M/uL    Hemoglobin 16.8 (H) 12.0 - 16.0 g/dL    Hematocrit 49.9 (H) 36.0 - 48.0 %    MCV 85.9 80.0 - 100.0 fL    MCH 28.9 26.0 - 34.0 pg    MCHC 33.6 31.0 - 36.0 g/dL    RDW 15.5 (H) 12.4 - 15.4 %    Platelets 477 738 - 931 K/uL    MPV 8.3 5.0 - 10.5 fL    Neutrophils % 91.0 %    Lymphocytes % 4.2 %    Monocytes % 4.1 %    Eosinophils % 0.5 %    Basophils % 0.2 %    Neutrophils Absolute 14.0 (H) 1.7 - 7.7 K/uL    Lymphocytes Absolute 0.7 (L) 1.0 - 5.1 K/uL    Monocytes Absolute 0.6 0.0 - 1.3 K/uL    Eosinophils Absolute 0.1 0.0 - 0.6 K/uL    Basophils Absolute 0.0 0.0 - 0.2 K/uL   Comprehensive Metabolic Panel w/ Reflex to MG   Result Value Ref Range    Potassium reflex Magnesium 2.3 (LL) 3.5 - 5.1 mmol/L    Total Protein 8.2 6.4 - 8.2 g/dL    Alb 4.2 3.4 - 5.0 g/dL    Albumin/Globulin Ratio 1.1 1.1 - 2.2    Total Bilirubin 1.3 (H) 0.0 - 1.0 mg/dL    Alkaline Phosphatase 222 (H) 40 - 129 U/L ALT 15 10 - 40 U/L    AST 19 15 - 37 U/L    Globulin 4.0 g/dL   D-Dimer, Quantitative   Result Value Ref Range    D-Dimer, Quant 432 (H) 0 - 229 ng/mL DDU   Blood Gas, Venous   Result Value Ref Range    pH, Christopher 7.447 7.350 - 7.450    pCO2, Christopher 57.7 (H) 40.0 - 50.0 mmHg    pO2, Christopher 79.5 (H) 25.0 - 40.0 mmHg    HCO3, Venous 38.9 (H) 23.0 - 29.0 mmol/L    Base Excess, Christopher 11.7 (H) -3.0 - 3.0 mmol/L    O2 Sat, Christopher 96 Not Established %    Carboxyhemoglobin 4.1 (H) 0.0 - 1.5 %    MetHgb, Christopher 0.3 <1.5 %    TC02 (Calc), Christopher 41 Not Established mmol/L    O2 Content, Christopher 23 Not Established VOL %    O2 Therapy Unknown    Lactate, Sepsis   Result Value Ref Range    Lactic Acid, Sepsis 1.2 0.4 - 1.9 mmol/L   COVID-19   Result Value Ref Range    SARS-CoV-2, NAAT Not Detected Not Detected   Urinalysis, reflex to microscopic   Result Value Ref Range    Color, UA Yellow Straw/Yellow    Clarity, UA Clear Clear    Glucose, Ur Negative Negative mg/dL    Bilirubin Urine Negative Negative    Ketones, Urine Negative Negative mg/dL    Specific Gravity, UA 1.025 1.005 - 1.030    Blood, Urine Negative Negative    pH, UA 5.0 5.0 - 8.0    Protein, UA TRACE (A) Negative mg/dL    Urobilinogen, Urine 0.2 <2.0 E.U./dL    Nitrite, Urine Negative Negative    Leukocyte Esterase, Urine Negative Negative    Microscopic Examination YES     Urine Type NotGiven    Microscopic Urinalysis   Result Value Ref Range    Hyaline Casts, UA 0-2 0 - 2 /LPF    WBC, UA 0-2 0 - 5 /HPF    RBC, UA None seen 0 - 4 /HPF    Epithelial Cells, UA 11-20 (A) 0 - 5 /HPF    Bacteria, UA 2+ (A) None Seen /HPF   Magnesium   Result Value Ref Range    Magnesium 2.20 1.80 - 2.40 mg/dL   EKG 12 Lead   Result Value Ref Range    Ventricular Rate 104 BPM    Atrial Rate 104 BPM    P-R Interval 162 ms    QRS Duration 148 ms    Q-T Interval 400 ms    QTc Calculation (Bazett) 526 ms    P Axis 78 degrees    R Axis -72 degrees    T Axis 72 degrees    Diagnosis       Sinus tachycardia with occasional Premature ventricular complexesPossible Left atrial enlargementLeft axis deviationRight bundle branch blockAnteroseptal infarct , age undeterminedT wave abnormality, consider lateral ischemiaAbnormal ECGNo previous ECGs available       ECG  The Ekg interpreted by me shows  sinus tachycardia, flxs=817, occasional PVCs, right bundle branch block  Axis is   Left axis deviation  QTc is  prolonged at 526  Intervals and Durations are unremarkable. ST Segments: nonspecific changes  No significant change from prior EKG dated 8/18/2020    RADIOLOGY  XR CHEST PORTABLE   Final Result   No active cardiopulmonary disease           ED COURSE/MDM  Patient seen and evaluated. Old records reviewed. Labs and imaging reviewed and results discussed with patient. Patient is a 57-year-old female, presenting with concerns for cough and chest pain for the past 9 weeks, sent into the ER by her cardiologist for further evaluation. Full HPI as detailed above. Plan arrival in the ED, vitals remarkable for tachycardia otherwise reassuring. Patient is afebrile. She has had 3 treatments with antibiotics and steroids without improvement of her cough. Today, she does have a white count of 15.4. Chest x-ray is negative for any cardiopulmonary disease. She is tachycardic and does meet sepsis criteria with concern for possible pulmonary infection. Labs were also remarkable for hyponatremia with a sodium of 127, hypokalemia with potassium of 2.3. Her creatinine is elevated at 1.9. Her lactate is normal.  Troponin is chronically elevated and stable. Her Covid test is negative. Given her tachycardia and chest pain and shortness of breath, D-dimer was ordered which was elevated at 432 however given her decreased GFR will not be able to obtain a CT with PE protocol here in the department. Patient was given potassium repletion as well as gentle IV fluids, does have a history of CHF and has stable blood pressures.   For this reason, was judicious with fluid resuscitation despite the fact that she does meet sepsis criteria. She was treated with IV antibiotics. She will be hospitalized for further work-up and treatment of her condition. Findings are discussed with the patient is comfortable and in agreement with plan of care. During the patient's ED course, the patient was given:  Medications   azithromycin (ZITHROMAX) 500 mg in D5W 250ml addavial (500 mg Intravenous New Bag 11/30/20 1306)   potassium chloride 10 mEq/100 mL IVPB (Peripheral Line) (10 mEq Intravenous Not Given 11/30/20 1301)   0.9 % sodium chloride bolus (0 mLs Intravenous Stopped 11/30/20 1323)   cefTRIAXone (ROCEPHIN) 1 g IVPB in 50 mL D5W minibag (0 g Intravenous Stopped 11/30/20 1305)   potassium bicarb-citric acid (EFFER-K) effervescent tablet 40 mEq (40 mEq Oral Given 11/30/20 1300)        CLINICAL IMPRESSION  1. Cough    2. Septicemia ([address]    3. Hypokalemia    4. Elevated d-dimer        Blood pressure (!) 125/90, pulse 102, temperature 98.2 °F (36.8 °C), temperature source Oral, resp. rate 18, height 5' 4\" (1.626 m), weight 207 lb (93.9 kg), last menstrual period 10/24/2012, SpO2 94 %, not currently breastfeeding. DISPOSITION  Leandra Simon was admitted in stable condition. Patient was given scripts for the following medications. I counseled patient how to take these medications. New Prescriptions    No medications on file       Follow-up with:  No follow-up provider specified. DISCLAIMER: This chart was created using Dragon dictation software. Efforts were made by me to ensure accuracy, however some errors may be present due to limitations of this technology and occasionally words are not transcribed correctly.        Ana Laura Pan MD  11/30/20 8712

## 2020-11-30 NOTE — PROGRESS NOTES
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 Surgical History:   has a past surgical history that includes Tonsillectomy; Cholecystectomy; tumor excision; Upper gastrointestinal endoscopy (4/25/2013); Upper gastrointestinal endoscopy (12/10/2015); Upper gastrointestinal endoscopy (01/06/2017); Arterial bypass surgry (Left, 01/06/2016); Cardiac catheterization (03/27/2018); Coronary angioplasty with stent (03/16/2018); Rotator cuff repair (Left, 04/22/2016); Coronary angioplasty with stent (01/03/2018); Insertable Cardiac Monitor (02/14/2019); femoral bypass (Right, 5/16/2019); transluminal angioplasty (Left, 10/08/2019); Cardiac catheterization (01/20/2020); Coronary artery bypass graft (N/A, 1/27/2020); vascular surgery (Left, 12/08/2015); transluminal angioplasty (Left, 04/29/2020); vascular surgery (Bilateral, 07/07/2020); Coronary angioplasty with stent (10/07/2019); transesophageal echocardiogram (01/26/2020); vascular surgery (Left, 08/04/2020); vascular surgery (Left, 08/05/2020); and vascular surgery (Left, 09/01/2020). Social History:   reports that she has been smoking cigarettes. She has a 15.00 pack-year smoking history. She has never used smokeless tobacco. She reports that she does not drink alcohol or use drugs.    Family History:   Family History   Problem Relation Age of Onset    Heart Disease Maternal Grandmother     Cancer Mother         lung and brain cancer    Cancer Maternal Aunt         lung and brain cancer       Home Medications:  Prior to Admission medications Medication Sig Start Date End Date Taking?  Authorizing Provider   Blood Glucose Monitoring Suppl (FREESTYLE FREEDOM LITE) w/Device KIT Use to test glucose 4 times a day 11/17/20  Yes Rickey Palafox MD   blood glucose test strips (FREESTYLE LITE) strip USE TO CHECK BLOOD GLUCOSE LEVELS FOUR TIMES DAILY 11/17/20  Yes Rickey Palafox MD   torsemide (DEMADEX) 100 MG tablet Take 1 tablet by mouth daily 11/3/20  Yes TRAY Mace CNP   sacubitril-valsartan (ENTRESTO) 49-51 MG per tablet Take by mouth Half tablet   Yes Historical Provider, MD   metOLazone (ZAROXOLYN) 2.5 MG tablet Take 1 tablet by mouth Twice a Week [facility]  Patient taking differently: Take 2.5 mg by mouth three times a week [facility] 10/19/20  Yes TRAY Mace CNP   tiotropium (SPIRIVA RESPIMAT) 2.5 MCG/ACT AERS inhaler INHALE 2 PUFFS INTO THE LUNGS DAILY 10/13/20  Yes Angie Cheema MD   Insulin Degludec (TRESIBA FLEXTOUCH) 100 UNIT/ML SOPN Inject 30 Units into the skin nightly 9/22/20  Yes TRAY Franklin CNP   Liraglutide (VICTOZA) 18 MG/3ML SOPN SC injection Inject 1.2 mg into the skin daily 9/22/20  Yes TRAY Franklin CNP   Insulin Pen Needle (B-D ULTRAFINE III SHORT PEN) 31G X 8 MM MISC Five shots a day 9/22/20  Yes TRAY Franklin CNP   spironolactone (ALDACTONE) 50 MG tablet Take 1 tablet by mouth daily 9/10/20  Yes TRAY Mace CNP   potassium chloride (KLOR-CON M) 20 MEQ extended release tablet Take 2 tablets by mouth 3 times daily  Patient taking differently: Take 40 mEq by mouth 4 times daily  9/2/20  Yes TRAY Mace CNP   midodrine (PROAMATINE) 10 MG tablet Take 1 tablet by mouth 3 times daily (with meals) 9/2/20  Yes Geno Harp MD   insulin aspart (NOVOLOG FLEXPEN) 100 UNIT/ML injection pen Inject 10 Units into the skin 3 times daily (before meals) 8/14/20  Yes TRAY Franklin - CNP   traZODone (DESYREL) 100 MG tablet Take 100 mg by mouth nightly as needed for Sleep Can take 1-2 tabs nightly   Yes Historical Provider, MD   DOXEPIN HCL PO Take 1 capsule by mouth nightly as needed Patient unsure of exact dosage   Yes Historical Provider, MD   albuterol sulfate  (90 Base) MCG/ACT inhaler Inhale 2 puffs into the lungs every 6 hours as needed for Wheezing or Shortness of Breath 7/3/20  Yes Marisel Duong MD   atorvastatin (LIPITOR) 80 MG tablet Take 1 tablet by mouth nightly 2/28/20  Yes Claire Spear APRN - CNP   buprenorphine-naloxone (SUBOXONE) 8-2 MG FILM SL film Place 1 Film under the tongue 2 times daily. Yes Historical Provider, MD   benztropine (COGENTIN) 1 MG tablet Take 1 mg by mouth nightly  12/13/19  Yes Historical Provider, MD   INSULIN SYRINGE .5CC/29G 29G X 1/2\" 0.5 ML MISC 1 each by Does not apply route daily 12/3/19  Yes Aleksandra Norman, APRN - CNP   pantoprazole (PROTONIX) 40 MG tablet Take 1 tablet by mouth 2 times daily 10/1/19  Yes Leah Bhakta MD   clopidogrel (PLAVIX) 75 MG tablet TAKE 1 TABLET BY MOUTH EVERY DAY 9/3/19  Yes Kaya Hernández APRN - CNP   magnesium oxide (MAG-OX) 400 (240 Mg) MG tablet TAKE 1 TABLET ONCE DAILY 5/1/19  Yes Francesca Olmedo MD   busPIRone (BUSPAR) 30 MG tablet Take 30 mg by mouth 2 times daily  4/2/18  Yes Historical Provider, MD   aspirin EC 81 MG EC tablet Take 1 tablet by mouth daily 1/8/16  Yes Awilda Mcgee MD   ARIPiprazole (ABILIFY) 10 MG tablet Take 10 mg by mouth daily    Yes Historical Provider, MD   lamoTRIgine (LAMICTAL) 150 MG tablet Take 200 mg by mouth 2 times daily    Yes Historical Provider, MD        Allergies:  Lisinopril     Review of Systems:   Review of Systems   Constitutional: Positive for activity change and fatigue. Negative for appetite change. HENT: Positive for congestion, postnasal drip and rhinorrhea. Eyes: Positive for visual disturbance. Respiratory: Positive for cough, chest tightness, shortness of breath and wheezing.     Cardiovascular: Positive for chest pain, palpitations and leg swelling. Gastrointestinal: Positive for abdominal distention. Musculoskeletal: Positive for arthralgias and myalgias. Skin: Positive for wound. Neurological: Positive for weakness and numbness. Negative for dizziness, syncope and light-headedness. Hematological: Does not bruise/bleed easily. Psychiatric/Behavioral: Positive for sleep disturbance. Physical Examination:    Vitals:    11/30/20 0930   BP: 130/80   Pulse: 110   Temp: 96.4 °F (35.8 °C)   SpO2: 97%   Weight: 208 lb (94.3 kg)   Height: 5' 4\" (1.626 m)        Constitutional and General Appearance: Warm and dry, no apparent distress, normal coloration  HEENT:  Normocephalic, atraumatic  Respiratory:  · Normal excursion and expansion without use of accessory muscles  · Resp Auscultation: course wheezes throughoout anterior and posterior, tight, no rales  Cardiovascular:  · The apical impulses not displaced  · Heart tones are crisp and normal  · JVP 8-9 cm H2O  · Regular rate and rhythm, distant due to lung sounds, no appreciable m/g/r  · Peripheral pulses are symmetrical and full  · There is no clubbing, cyanosis of the extremities.   · No BLE edema, + wrinkling, + open sore to RLE anterior tibia  · Pedal Pulses: 2+ and equal   Abdomen:  · No masses or tenderness, soft  · Liver/Spleen: No Abnormalities Noted  Neurological/Psychiatric:  · Alert and oriented in all spheres  · Moves all extremities well  · Exhibits normal gait balance and coordination  · No abnormalities of mood, affect, memory, mentation, or behavior are noted    Lab Data:  Most recent lab results below reviewed in office    CBC:   Lab Results   Component Value Date    WBC 7.9 10/27/2020    WBC 7.4 10/14/2020    WBC 5.9 08/31/2020    RBC 4.27 10/27/2020    RBC 4.75 10/14/2020    RBC 3.96 08/31/2020    HGB 11.9 10/27/2020    HGB 13.5 10/14/2020    HGB 10.6 08/31/2020    HCT 37.5 10/27/2020    HCT 41.3 10/14/2020    HCT 33.3 08/31/2020    MCV 88 10/27/2020    MCV 87.0 10/14/2020 MCV 84.1 08/31/2020    RDW 19.6 10/14/2020    RDW 25.5 08/31/2020    RDW 26.5 08/28/2020     10/27/2020     10/14/2020     08/31/2020     BMP:  Lab Results   Component Value Date     11/16/2020     11/06/2020     10/27/2020    K 3.3 11/16/2020    K 3.6 11/06/2020    K 3.6 10/27/2020    K 2.8 08/18/2020    K 3.5 08/05/2020    K 3.3 08/05/2020    CL 86 11/16/2020    CL 88 11/06/2020    CL 92 10/27/2020    CO2 33 11/16/2020    CO2 35 11/06/2020    CO2 24 10/27/2020    PHOS 3.0 09/02/2020    PHOS 3.1 09/01/2020    PHOS 4.0 08/31/2020    BUN 47 11/16/2020    BUN 53 11/06/2020    BUN 53 10/27/2020    CREATININE 1.3 11/16/2020    CREATININE 1.7 11/06/2020    CREATININE 1.56 10/27/2020     BNP:   Lab Results   Component Value Date    PROBNP 3,586 11/06/2020    PROBNP 2,366 10/23/2020    PROBNP 5,014 09/05/2020     LIPID:   Lab Results   Component Value Date    TRIG 78 01/27/2020    TRIG 140 01/11/2020    HDL 21 01/27/2020    HDL 27 01/11/2020    [address] 21 01/27/2020    LDLCALC 103 01/11/2020    LDLDIRECT 187 08/01/2017       Cardiac Imaging:  VASCULAR PROCEDURE:   DATE OF PROCEDURE:  09/01/2020   PREOPERATIVE DIAGNOSES:  Left subclavian artery stenosis with coronary steal syndrome.   POSTOPERATIVE DIAGNOSES:  Left subclavian artery stenosis with coronary steal syndrome.   PROCEDURES PERFORMED:  1. Selective left subclavian angiogram.  2.  Placement of catheter in the subclavian artery. 3.  Angioplasty and stenting of the subclavian artery.   4.  Left upper extremity angiogram.       CARDIAC CATH 8/27/20:   FINDINGS   HEMODYNAMICS   CHAMBER PRESSURE SATURATION   RA  15  54 %   RV  62/16     PA  69/20  51 %   PW  19 with V waves to 25     AORTA  118/63  94 %      HERMILA CARDIAC OUTPUT  5.4 L/min   SVR  1075 L/min   PVR  343 L/min      Left subclavian angiogram shows proximal 95 to 99% stenosis.  There appears to be retrograde filling of the left subclavian artery by way of the LIMA and there appears to be coronary steal.  The vertebral arteries not well visualized.     Left heart catheterization   LVEDP  22   GRADIENT ACROSS AORTIC VALVE  none      CORONARY ARTERIES   LM  Proximal less than 10% stenosis, mid 10 to 20% stenosis, distal 70% stenosis.     Overall similar appearance to prior angiograms         LAD  Ostial 70% stenosis, the LAD is extensively stented in the proximal and mid segments, the stents appear to be widely patent with 20% in-stent restenosis, distal vessel has less than 10% stenosis.  There is retrograde filling into the LIMA to LAD graft that fills the left subclavian artery.     D1 is a small vessel with ostial 90% stenosis.     Overall similar appearance to prior angiograms         LCX  Circumflex is stented throughout the proximal and mid and distal segments into OM 1 which is a large vessel.  Stents are widely patent with 10 to 20% in-stent restenosis.  The stents jailed the AV groove circumflex and at the mid AV groove circumflex, there is a 70 to 80% stenosis.           RCA Dominant, medium vessel, heavily calcified, proximal-mid 60 to 70% stenosis, distal less than 10% stenosis, overall similar angiographically as compared to prior angiograms.      BYPASS GRAFTS   LIMA-LAD  Visualized nonselectively through retrograde filling from the native LAD, it is not visualized with antegrade flow from the left subclavian artery injection it appears to be widely patent with less than 10% [id number] stenosis.      CONCLUSIONS:   Elevated filling pressures, continue diuresis  CardioMEMS had good correlation with the right heart catheterization numbers.   Severe pulmonary hypertension  Patent LIMA to LAD graft however there appears to be coronary steal phenomenon due to severe left subclavian stenosis  We will consider left subclavian intervention  Will obtain follow-up vascular ultrasound to assess this  We will consult with CT surgery regarding therapeutic options       STRESS MPI 8/22/20:    Summary    Calculated LVEF is not reliable on study which is technically difficult, estimate of 51% is likely an overestimate     Mid-distal anteroseptal/apical akinesis    Large fixed defects in anteroseptal/apical walls    Mild to moderate reversibility in lateral walls consistent with ischemia    Overall, this would be considered an abnormal high risk study     URI 1/24/20:    Summary   Ejection fraction is visually estimated at 35-40%. Moderate to severe mitral regurgitation with multiple jets visualized. ERO estimated at 0.29 cm2. Mild tricuspid regurgitation. The left atrial appendage shows evidence of thrombus formation and low flow velocities. Moderate amount of plaque in the aorta    Surgery: 1/27/20 Urgent coronary artery bypass grafting surgery x1 with pedicled left internal mammary artery to the LAD. Cardiopulmonary bypass with on-pump beating-heart technique, no cross-clamp. Transesophageal echo. Epiaortic ultrasound. [place] catheter placement. Doppler verification of grafts. Bilateral five-level intercostal nerve block with Exparel. Platelet gel application. Sternal plating. Vas-Cath placement. CARDIAC CATH 1/20/2020:   Left Heart Cath  Dominance : Right   LM: 70-80% short distal stenosis   LAD: 70-80% short ostial stenosis, extending into takeoff of high first diagonal; large proximal to mid stented segment patent with jailed second diagonal  (80% ostial D2) and 70% in stent restenosis of most distal stent; distal to near apical LAD with minimal disease   LCx: 70-80% short ostial stenosis, prior to patent proximal to mid stents    RCA: large, dominant vessel with long 60% proximal to mid stenosis    LVEDP: 4 mmHG  LVG not done due to CKD.     Impression/Recommendations:  Diabetic with previous LAD and LCx stenting in 2018 returns with unstable angina, in stent restenosis and Left Main bifurcation disease.    Recommend CTS evaluation inpatient to discuss surgical revascularization option. Hold Clopidogrel. CT Abd/ Pelvis 12/25/19:   FINDINGS: Lower Chest: Lung bases are grossly clear. Small hiatal hernia. Left lower lobe calcified granuloma. Organs: Noncontrast appearance of the liver, spleen, adrenal glands, and pancreas unremarkable. Pneumobilia identified within liver may be related to previous cholecystectomy or other biliary procedure. Kidneys symmetric in size. Left renal hilar calcifications are favored to be vascular. GI/Bowel: Moderate to large amount retained stool throughout the colon. Appendix is unremarkable. No evidence of bowel obstruction. Pelvis: Bladder is mildly distended. Uterus unremarkable. No adnexal mass. Peritoneum/Retroperitoneum: Moderate severe aortic vascular calcifications. Aorta is nonaneurysmal.  No free air or free fluid. Bones/Soft Tissues: Severe multilevel facet arthropathy     BLE ZACH's 4/18/19:   1. There is severe arterial insufficiency at rest involving the right lower    extremity. There occlusive disease of the right proximal superficial femoral    (noted stent) and mid posterior tibial arteries. There is a 50-74% stenosis    at 4the right deep femoral artery with evidence of inflow disease.    2. There is severe arterial insufficiency at rest involving the left lower    extremity. There is occlusive disease of the left proximal superficial    femoral artery (noted stent). There is a 30-49% stenosis at the left deep    femoral artery with evidence of inflow disease.         ECHO 4/3/19:   Jany Yung systolic function is mildly reduced with EF estimated at 40-45%.   There is severe HK of the apex and apical-septal segment.   Normal left ventricular diastolic filling pressure.   Mild mitral regurgitation.   Systolic pulmonary artery pressure (SPAP) estimated at 32mmHg (RA pressure 3mmHg).      Arterial doppler studies 8/2/18:   Silva Sherwood is no arterial insufficiency at rest involving the lower extremities    bilaterally, however, there is evidence to suggest calf vessel disease    bilaterally. Echo: 5/23/18:    Ejection fraction is visually estimated to be 30-35 %. There is akinesis of the apex and inferior walls. Left ventricular size is mildly increased. Moderate mitral regurgitation. Moderate amount plaque is noted in the aorta. The left atrium is mildly dilated. 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 A bubble study was performed and fails to show evidence of shunting. Procedure performed: Transesophageal echocardiogram 5/25/18:    Findings:  Reduced left ventricular function with ejection fraction estimated at about 35% with apical and inferior akinesis    The cardiac valves show moderate mitral regurgitation  There is no evidence for an intracardiac shunt or intracardiac thrombus. Cardiac cath 5/25/18:   Procedure Performed:  1. Coronary angiogram  2. Left heart cath  3. Left ventriculogram  4. Right heart cath   Findings:  1. Patent LAD and CIRC stents              ~mild CAD  2. Reduced LVEF 30% with anterior and apical akinesis  3. Moderate pulmonary hyperension   Conclusion/plan of care:  1. Medical management    [facility] 3/26/18 [employer] [place] promus premier [id number] CCx and [id number] CCx. Assessment:    1. Ischemic cardiomyopathy    2. Chronic systolic heart failure ([address]    3. Coronary artery disease involving native coronary artery of native heart without angina pectoris    4. Hypokalemia    5. S/P CABG x 1    6. Mitral valve insufficiency, unspecified etiology    7. Stage 3b chronic kidney disease    8. PVD (peripheral vascular disease) with claudication ([address]    9. Type 2 diabetes mellitus with stage 3b chronic kidney disease, with long-term current use of insulin ([address]    10. Mixed hyperlipidemia    11. LCINT (obstructive sleep apnea)          Plan:   1. To ED for shortness of breath/ wheezing/ tight and cough  2.  No signs of CHF/ fluid overload on exam  3. Continue current dose of diuretics. 4. Will review blood work and make any adjustments as needed  5. Follow up with me in 4-6 weeks      I appreciate the opportunity of cooperating in the care of this individual.    Danette Hagen, TRAY - CNP, 11/30/2020, 9:33 AM       QUALITY MEASURES  1. Tobacco Cessation Counseling: Yes  2. Retake of BP if >140/90:   NA  3. Documentation to PCP/referring for new patient:  Sent to PCP at close of office visit  4. CAD patient on anti-platelet: Yes  5. CAD patient on STATIN therapy:  Yes  6.  Patient with CHF and aFib on anticoagulation:  NA

## 2020-11-30 NOTE — ED NOTES
Resting quietly in bed, denies further needs at this time. Will continue to monitor. Jazlyn Artis RN  11/30/20 0665

## 2020-11-30 NOTE — ED NOTES
Returned from nuclear medicine without incident, will continue to monitor. Kenda Krabbe, RN  11/30/20 4819

## 2020-11-30 NOTE — ED NOTES
Eusebio sent to Dr. Cam Glover at [address]  11/30/20 1303    Eusebio completed with call back from [address] [place] [address] at [address]  11/30/20 1324

## 2020-11-30 NOTE — LETTER
[facility] [facility]  [address] [facility] [address]  Phone: [phone]         November 30, 2020     Patient: Gladys Luciano   YOB: 1965   Date of Visit: 11/30/2020       To Whom It May Concern:    Maryhelen Cushing was seen and treated in our emergency department on 11/30/2020. The following work duties are recommended. She may return to work on 12/13/2020    Treatment and work recommendations given by the emergency department are initial emergency measures only, and follow up should be arranged as soon as possible with the company's occupational health provider* or the specialist to whom the worker was referred. *If the company does not have an occupational health provider, follow up should be at No follow-up provider specified.         Sincerely,        Ana Laura Peña RN        Signature:__________________________________

## 2020-11-30 NOTE — PATIENT INSTRUCTIONS
1. To ED for shortness of breath/ wheezing/ tight and cough  2. No signs of CHF/ fluid overload on exam  3. Continue current dose of diuretics. 4. Will review blood work and make any adjustments as needed  5.  Follow up with me in 4-6 weeks

## 2020-12-01 PROBLEM — E44.0 MODERATE PROTEIN-CALORIE MALNUTRITION (HCC): Status: ACTIVE | Noted: 2020-01-01

## 2020-12-01 NOTE — PROGRESS NOTES
1.5L bolused total today. Pressures 66/41 and   61/46.      Additional Liter bolusing now at 999mL/hr

## 2020-12-01 NOTE — PROGRESS NOTES
2.5 L bolused today total.     Pressures 79/54,    74/41,     74/50    Patient very lethargic, nauseas, vomiting, and anxious. Potassium 2.62    Paging nephrology.

## 2020-12-01 NOTE — H&P
08/05/2020    coil embolization of branch renal artery vessel for bleeding    VASCULAR SURGERY Left 09/01/2020    angioplasty and stenting of subclavian artery       Medications Prior to Admission:      Prior to Admission medications    Medication Sig Start Date End Date Taking?  Authorizing Provider   Blood Glucose Monitoring Suppl (FREESTYLE FREEDOM LITE) w/Device KIT Use to test glucose 4 times a day 11/17/20  Yes Rona Carvajal MD   blood glucose test strips (FREESTYLE LITE) strip USE TO CHECK BLOOD GLUCOSE LEVELS FOUR TIMES DAILY 11/17/20  Yes Rona Carvajal MD   torsemide (DEMADEX) 100 MG tablet Take 1 tablet by mouth daily 11/3/20  Yes TRAY Mata CNP   sacubitril-valsartan (ENTRESTO) 49-51 MG per tablet Take by mouth Half tablet   Yes Historical Provider, MD   metOLazone (ZAROXOLYN) 2.5 MG tablet Take 1 tablet by mouth Twice a Week MW  Patient taking differently: Take 2.5 mg by mouth three times a week MWF 10/19/20  Yes TRAY Mata CNP   tiotropium (SPIRIVA RESPIMAT) 2.5 MCG/ACT AERS inhaler INHALE 2 PUFFS INTO THE LUNGS DAILY 10/13/20  Yes Aretha Deleon MD   Insulin Degludec (TRESIBA FLEXTOUCH) 100 UNIT/ML SOPN Inject 30 Units into the skin nightly 9/22/20  Yes TRAY Elias CNP   Liraglutide (VICTOZA) 18 MG/3ML SOPN SC injection Inject 1.2 mg into the skin daily 9/22/20  Yes TRAY Elias CNP   Insulin Pen Needle (B-D ULTRAFINE III SHORT PEN) 31G X 8 MM MISC Five shots a day 9/22/20  Yes TRAY Elias CNP   spironolactone (ALDACTONE) 50 MG tablet Take 1 tablet by mouth daily 9/10/20  Yes TRAY Mata CNP   potassium chloride (KLOR-CON M) 20 MEQ extended release tablet Take 2 tablets by mouth 3 times daily  Patient taking differently: Take 40 mEq by mouth 4 times daily  9/2/20  Yes TRAY Mata CNP   midodrine (PROAMATINE) 10 MG tablet Take 1 tablet by mouth 3 times daily (with meals) 9/2/20  Yes Junior Duarte MD   insulin aspart (NOVOLOG FLEXPEN) 100 UNIT/ML injection pen Inject 10 Units into the skin 3 times daily (before meals) 8/14/20  Yes TRAY Cash CNP   traZODone (DESYREL) 100 MG tablet Take 100 mg by mouth nightly as needed for Sleep Can take 1-2 tabs nightly   Yes Historical Provider, MD   DOXEPIN HCL PO Take 1 capsule by mouth nightly as needed Patient unsure of exact dosage   Yes Historical Provider, MD   albuterol sulfate  (90 Base) MCG/ACT inhaler Inhale 2 puffs into the lungs every 6 hours as needed for Wheezing or Shortness of Breath 7/3/20  Yes Estrellita Estrada MD   atorvastatin (LIPITOR) 80 MG tablet Take 1 tablet by mouth nightly 2/28/20  Yes TRAY Salgado CNP   buprenorphine-naloxone (SUBOXONE) 8-2 MG FILM SL film Place 1 Film under the tongue 2 times daily. Yes Historical Provider, MD   benztropine (COGENTIN) 1 MG tablet Take 1 mg by mouth nightly  12/13/19  Yes Historical Provider, MD   INSULIN SYRINGE .5CC/29G 29G X 1/2\" 0.5 ML MISC 1 each by Does not apply route daily 12/3/19  Yes TRAY Cash CNP   pantoprazole (PROTONIX) 40 MG tablet Take 1 tablet by mouth 2 times daily 10/1/19  Yes Mabel Felty, MD   clopidogrel (PLAVIX) 75 MG tablet TAKE 1 TABLET BY MOUTH EVERY DAY 9/3/19  Yes TRAY Mazariegos CNP   magnesium oxide (MAG-OX) 400 (240 Mg) MG tablet TAKE 1 TABLET ONCE DAILY 5/1/19  Yes Naty Kelsey MD   busPIRone (BUSPAR) 30 MG tablet Take 30 mg by mouth 2 times daily  4/2/18  Yes Historical Provider, MD   aspirin EC 81 MG EC tablet Take 1 tablet by mouth daily 1/8/16  Yes Shakila Restrepo MD   ARIPiprazole (ABILIFY) 10 MG tablet Take 10 mg by mouth daily    Yes Historical Provider, MD   lamoTRIgine (LAMICTAL) 150 MG tablet Take 200 mg by mouth 2 times daily    Yes Historical Provider, MD       Allergies:  Lisinopril    Social History:      TOBACCO:   reports that she has been smoking cigarettes. She has a 15.00 pack-year smoking history.  She has never used smokeless tobacco.  ETOH:   reports no history of alcohol use. Family History:          Problem Relation Age of Onset    Heart Disease Maternal Grandmother     Cancer Mother         lung and brain cancer    Cancer Maternal Aunt         lung and brain cancer       REVIEW OF SYSTEMS:   Constitutional:  Negative for fever,chills or night sweats  ENT:  Negative for rhinorrhea, epistaxis, hoarseness, sore throat. Respiratory: Positive for cough and shortness of breath  Cardiovascular:   Negative for  chest pain, palpitations   Gastrointestinal:  Negative for nausea, vomiting, diarrhea  Genitourinary:  Negative for polyuria, dysuria   Hematologic/Lymphatic:  Negative for  bleeding tendency, easy bruising  Musculoskeletal:  Negative for myalgias and arthralgias  Neurologic:  Negative for  confusion,dysarthria. Skin:   Positive for itching  Psychiatric:  Negative for depression,anxiety, agitation. Endocrine:  Negative for polydipsia,polyuria,heat /cold intolerance. PHYSICAL EXAM:  /87   Pulse 106   Temp 99 °F (37.2 °C) (Oral)   Resp 20   Ht 5' 4.5\" (1.638 m)   Wt 207 lb 8 oz (94.1 kg)   LMP 10/24/2012   SpO2 92%   BMI 35.07 kg/m²     General appearance:  No apparent distress, appears stated age and cooperative. HEENT:  Normal cephalic, atraumatic without obvious deformity. Pupils equal, round, and reactive to light. Extra ocular muscles intact. Conjunctivae/corneas clear. Neck: Supple, with full range of motion. No jugular venous distention. Trachea midline. Respiratory: Mild inspiratory and expiratory wheezing bilaterally  Cardiovascular: Tachycardic. Abdomen: Soft, non-tender, non-distended with normal bowel sounds. Musculoskeletal:  No clubbing, cyanosis or edema bilaterally. Full range of motion without deformity. Skin: Excoriations all over body, worse on lower extremities. Chronic venous stasis dermatitis in bilateral lower extremities.   Neurologic:  Neurovascularly intact without any focal sensory/motor deficits. Cranial nerves: II-XII intact, grossly non-focal.  Psychiatric:  Alert and oriented, thought content appropriate, normal insight  Capillary Refill: Brisk,< 3 seconds   Peripheral Pulses: +2 palpable, equal bilaterally       Labs:   Recent Labs     11/30/20  1137   WBC 15.4*   HGB 16.8*   HCT 49.9*        Recent Labs     11/30/20  1137 11/30/20  2121   * 126*   K 2.3*  2.3* 2.9*   CL 76* 77*   CO2 38* 36*   BUN 63* 61*   CREATININE 1.9* 1.5*   CALCIUM 9.7 9.4   PHOS  --  3.1     Recent Labs     11/30/20  1137   AST 19   ALT 15   BILITOT 1.3*   ALKPHOS 222*     No results for input(s): INR in the last 72 hours. Recent Labs     11/30/20  1137   TROPONINI 0.03*       Urinalysis:      Lab Results   Component Value Date    NITRU Negative 11/30/2020    WBCUA 0-2 11/30/2020    BACTERIA 2+ 11/30/2020    RBCUA None seen 11/30/2020    BLOODU Negative 11/30/2020    SPECGRAV 1.025 11/30/2020    GLUCOSEU Negative 11/30/2020       Radiology:     CT CHEST WO CONTRAST   Final Result   1. Improving with incompletely imaged small residual perinephric hematoma. NM LUNG VENT/PERFUSION (VQ)   Final Result   Low probability for pulmonary embolus. XR CHEST PORTABLE   Final Result   No active cardiopulmonary disease             ASSESSMENT:  Hypokalemia, secondary to diuresis  Shortness of breath  HFrEF  Chronic systolic heart failure  Ischemic cardiomyopathy  Diabetes type 2 uncontrolled  Pseudohyponatremia  Chronic kidney disease stage III  Peripheral arterial disease  Pulmonary hypertension    PLAN:  Admit to telemetry  We will hold off antibiotics as there is no clear sign of infection. Breathing treatments, supplemental oxygen if necessary  Aggressive replacement of potassium. ,  Recheck renal panel in the a.m.   Resume home medications  Sliding scale high-dose insulin therapy    DVT Prophylaxis: Heparin  Diet: DIET CARB CONTROL;  Code Status: Full Code    Dispo -admit to [facility]      Thank you for the opportunity to be involved in this patient's care.       (Please note that portions of this note were completed with a voice recognition program. Efforts were made to edit the dictations but occasionally words are mis-transcribed.)

## 2020-12-01 NOTE — PROGRESS NOTES
Nephrology paged, 40 IV potassium ordered.  Stated will likely need to go to ICU, but leave it up to hospitalist.

## 2020-12-01 NOTE — PROGRESS NOTES
Second bolus completed. Blood pressures as follows-   71/44,    66/41,    61/46    500mL bolus ordered and administering. Continuing to monitor.

## 2020-12-01 NOTE — PROGRESS NOTES
40 IV Potassium ordered. (nephro)    500cc Bolus over 2hrs ordered. (hospitalist)    One time dose Midodrine 10mg  ordered.  (hospitalist)

## 2020-12-01 NOTE — CONSULTS
[facility]    Consult Note           Reason for Consult: SHAUNNA  Requesting Physician:  Dr. Araseli Godinez for    Chief Complaint:    Chief Complaint   Patient presents with    Chest Pain     x 9 weeks could not take the pain any longer. Pt seen by cardiologist today and sent to ER    Cough     x 9 weeks Pt has taken 3 rounds of atb and steroids without success       History of Present Illness on 12/1/2020:    60 y.o. yo female with PMH of CHF with reduced EF who is admitted for hyponatremia, hyperkalemia, SHAUNNA  Patient has not been feeling well for last few days  She was seen at cardiology office and was asked to come to the emergency room because of numbness of breath and wheezing. She was noted to have to be hypokalemic and hyponatremic along with SHAUNNA  She has been admitted and nephrology has been consulted for this reasons. She received Entresto this morning around 1:20 AM.  Her blood pressure has dropped down to 60s- 70s, in the PCU for which the patient has received 1.5 L of normal saline bolus.      Past Medical History:        Diagnosis Date    Acute blood loss anemia 8/5/2020    Acute kidney injury ([address] 12/25/2019    Acute kidney injury superimposed on CKD ([address] 8/5/2020    Acute on chronic combined systolic and diastolic congestive heart failure ([address] 1/10/2020    Acute on chronic right-sided heart failure ([address] 08/2020    Alcohol dependence ([address] 3/10/2010    Cellulitis 6/3/2020    Diabetic ulcer of left foot associated with type 2 diabetes mellitus, limited to breakdown of skin ([address] 1/18/2020    Diabetic ulcer of toe of right foot associated with type 2 diabetes mellitus ([address] 1/18/2020    Left renal artery stenosis ([address] 08/2020    MI (myocardial infarction) ([address] 10/07/2019    NSTEMI    Positive FIT (fecal immunochemical test) 2/28/2020    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 steal syndrome 09/01/2020    Traumatic perinephric hematoma, left, initial encounter 2020       Past Surgical History:        Procedure Laterality Date    ARTERIAL BYPASS SURGRY Left 2016    Axillary/Subclavian to Brachial Bypass w/reversed SVG    CARDIAC CATHETERIZATION  2018    Dr. Zac Gar - at [address]  2020    Dr. Yoel Salamanca      pancreatitis post surgery    CORONARY ANGIOPLASTY WITH STENT PLACEMENT  2018    MELODY- 3.5 x 38 and 3.5 x 20 to Cx    CORONARY ANGIOPLASTY WITH STENT PLACEMENT  2018    MELODY- 3.0 x 38 and 2.75 x 26 and 2.75 x 8 and 2.75 x 22 to the LAD    CORONARY ANGIOPLASTY WITH STENT PLACEMENT  10/07/2019    MELODY- 2.5 x 8 to [address] GRAFT N/A 2020    CORONARY ARTERY BYPASS GRAFTING X 1, ON PUMP BEATING HEART, INTERNAL MAMMARY ARTERY TO LEFT ANTERIOR DESCENDING ARTERY, VAS CATH INSERTION, URI, PLATELET GEL APPLICATION, 5 LEVEL BILATERAL INTERCOSTAL NERVE BLOCK, STERNAL PLATING performed by Real Jamison MD at [address] [facility] [address] Right 2019    fem-pop, performed by Kanwal Rodriguez MD at [address]  2019    CardioMEMs insertion for CHF    ROTATOR CUFF REPAIR Left 2016    TONSILLECTOMY      TRANSESOPHAGEAL ECHOCARDIOGRAM  2020    TRANSLUMINAL ANGIOPLASTY Left 10/08/2019    with stenting, at [facility]    [address] Left 2020    of the [facility] re-occlusion    TUMOR EXCISION      benign tumors X 2 chest & axilla    UPPER GASTROINTESTINAL ENDOSCOPY  2013    BX barretts, HH, gastritis    UPPER GASTROINTESTINAL ENDOSCOPY  12/10/2015    UPPER GASTROINTESTINAL ENDOSCOPY  2017    Gastritis    VASCULAR SURGERY Left 2015    upper extremity and mesenteric angiogram (diagnostic only)    VASCULAR SURGERY Bilateral 2020    diagnostic lower extremity angiogram only    VASCULAR SURGERY Left 2020    angioplasty and stent of renal artery    VASCULAR SURGERY Left 08/05/2020    coil embolization of branch renal artery vessel for bleeding    VASCULAR SURGERY Left 09/01/2020    angioplasty and stenting of subclavian artery       Home Medications:    No current facility-administered medications on file prior to encounter.       Current Outpatient Medications on File Prior to Encounter   Medication Sig Dispense Refill    Blood Glucose Monitoring Suppl (FREESTYLE FREEDOM LITE) w/Device KIT Use to test glucose 4 times a day 1 kit 0    blood glucose test strips (FREESTYLE LITE) strip USE TO CHECK BLOOD GLUCOSE LEVELS FOUR TIMES DAILY 200 strip 10    torsemide (DEMADEX) 100 MG tablet Take 1 tablet by mouth daily 90 tablet 1    sacubitril-valsartan (ENTRESTO) 49-51 MG per tablet Take by mouth Half tablet      metOLazone (ZAROXOLYN) 2.5 MG tablet Take 1 tablet by mouth Twice a Week MWF (Patient taking differently: Take 2.5 mg by mouth three times a week MWF) 30 tablet 0    tiotropium (SPIRIVA RESPIMAT) 2.5 MCG/ACT AERS inhaler INHALE 2 PUFFS INTO THE LUNGS DAILY 1 Inhaler 6    Insulin Degludec (TRESIBA FLEXTOUCH) 100 UNIT/ML SOPN Inject 30 Units into the skin nightly 10 pen 5    Liraglutide (VICTOZA) 18 MG/3ML SOPN SC injection Inject 1.2 mg into the skin daily 2 pen 3    Insulin Pen Needle (B-D ULTRAFINE III SHORT PEN) 31G X 8 MM MISC Five shots a day 200 each 2    spironolactone (ALDACTONE) 50 MG tablet Take 1 tablet by mouth daily 30 tablet 5    potassium chloride (KLOR-CON M) 20 MEQ extended release tablet Take 2 tablets by mouth 3 times daily (Patient taking differently: Take 40 mEq by mouth 4 times daily ) 180 tablet 3    midodrine (PROAMATINE) 10 MG tablet Take 1 tablet by mouth 3 times daily (with meals) 90 tablet 3    insulin aspart (NOVOLOG FLEXPEN) 100 UNIT/ML injection pen Inject 10 Units into the skin 3 times daily (before meals) 5 pen 3    traZODone (DESYREL) 100 MG tablet Take 100 mg by mouth nightly as needed for Sleep Can take 1-2 tabs nightly      DOXEPIN HCL PO Take 1 capsule by mouth nightly as needed Patient unsure of exact dosage      albuterol sulfate  (90 Base) MCG/ACT inhaler Inhale 2 puffs into the lungs every 6 hours as needed for Wheezing or Shortness of Breath 1 Inhaler 3    atorvastatin (LIPITOR) 80 MG tablet Take 1 tablet by mouth nightly 90 tablet 3    buprenorphine-naloxone (SUBOXONE) 8-2 MG FILM SL film Place 1 Film under the tongue 2 times daily.       benztropine (COGENTIN) 1 MG tablet Take 1 mg by mouth nightly       INSULIN SYRINGE .5CC/29G 29G X 1/2\" 0.5 ML MISC 1 each by Does not apply route daily 100 each 3    pantoprazole (PROTONIX) 40 MG tablet Take 1 tablet by mouth 2 times daily 60 tablet 0    clopidogrel (PLAVIX) 75 MG tablet TAKE 1 TABLET BY MOUTH EVERY DAY 30 tablet 5    magnesium oxide (MAG-OX) 400 (240 Mg) MG tablet TAKE 1 TABLET ONCE DAILY 30 tablet 11    busPIRone (BUSPAR) 30 MG tablet Take 30 mg by mouth 2 times daily       aspirin EC 81 MG EC tablet Take 1 tablet by mouth daily 30 tablet 3    ARIPiprazole (ABILIFY) 10 MG tablet Take 10 mg by mouth daily       lamoTRIgine (LAMICTAL) 150 MG tablet Take 200 mg by mouth 2 times daily          Allergies:  Lisinopril    Social History:    Social History     Socioeconomic History    Marital status: Single     Spouse name: Not on file    Number of children: Not on file    Years of education: Not on file    Highest education level: Not on file   Occupational History    Not on file   Social Needs    Financial resource strain: Not on file    Food insecurity     Worry: Not on file     Inability: Not on file   [employer] needs     Medical: Not on file     Non-medical: Not on file   Tobacco Use    Smoking status: Current Every Day Smoker     Packs/day: 0.50     Years: 30.00     Pack years: 15.00     Types: Cigarettes    Smokeless tobacco: Never Used   Substance and Sexual Activity    Alcohol use: No     Comment: quit 2006     Drug use: Never    Sexual 2. 3*  2.3* 2.9* 2.7* 2.7*   CL 76* 77* 76* 76*   CO2 38* 36* 35* 36*   GLUCOSE 335* 452* 453* 321*   PHOS  --  3.1  --   --    MG 2.20  --  2.20  --    BUN 63* 61* 63* 66*   CREATININE 1.9* 1.5* 1.6* 2.4*   LABGLOM 27* 36* 33* 21*   GFRAA 33* 43* 40* 25*     UA w micro: trace protein, epith 11-20 cells    Assessment  -SHAUNNA on CKD III in the setting of over diuresis, decreased PO worsened by hypotension  -Hyponatremia from hypovolemia and thiazide diuretics, hyperglycemia  -hypokalemia from metolazone and decreased po  -Hypotension from hypovolemia  -CKD III baseline cr of high 1s  -CHFpEF now with hypovolemia  -DM w uncontrolled hyperglycemia     Plan  -s/p 1.5 l NS ; additional 1l bolus this afternoon  -NS at 150 ml/h after the bolus  -move to ICU if pt still hypotensive <90  -k supplements as ordered   -roche catheter  -BMP at 1600 n q8h  -Serial renal panel  -daily wts and strict i/o  -renal dose medications   -avoid nephrotoxins      Thank you for the consultation. Please do not hesitate to call with questions.     Daniel Lyn  [facility]  Office: [phone]  Fax:    [phone]

## 2020-12-01 NOTE — PLAN OF CARE
Nutrition Problem #1: Altered nutrition-related lab values  Intervention: Food and/or Nutrient Delivery: Continue Current Diet  Nutritional Goals: pt will consume 75% or greater of meals on CCC-4 diet order x 3 meals per day w/o BS > 180 mg/dl

## 2020-12-01 NOTE — PROGRESS NOTES
Pt transported in w/c to 302 with all personal belongings including cell phone. Ambulated without difficulty. Denies chest pain or pain at this time. Verbalizes understanding of admission for low potassium. Report called from ED to [facility] and care of pt transferred at bedside.  Saul Keller Clinical

## 2020-12-01 NOTE — PROGRESS NOTES
Pressures 87/58,   82/56,   81/54.   -500mL fluid bolus ordered. Potassium 2.7  -Replacing.  See [facility]

## 2020-12-01 NOTE — PROGRESS NOTES
Handoff report given to RAYMUNDO Bacon and [employer]. Pt is stable at this time. Care transferred.

## 2020-12-01 NOTE — PROGRESS NOTES
IM Progress Note    Admit Date:  11/30/2020  0    Interval history:  Sent in by cardiology office for dyspnea  Workup showed no pna or fluid overload but electrolyte abn   Of severe hyponatremia, hypokalemia       12/1: seen sitting up in bed, slightly wheezy this am.     Subjective:  Ms. Mary Mcdermott reports sob with exertion, no chest pain       Objective:   /88   Pulse 112   Temp 99 °F (37.2 °C) (Oral)   Resp 20   Ht 5' 4.5\" (1.638 m)   Wt 207 lb 8 oz (94.1 kg)   LMP 10/24/2012   SpO2 100%   BMI 35.07 kg/m²   No intake or output data in the 24 hours ending 12/01/20 9756    Physical Exam:        General: middle aged female obese, chronically sick appearing    Awake, alert and oriented.  Appears to be not in any distress  Mucous Membranes:  Pink , anicteric  Neck: No JVD, no carotid bruit, no thyromegaly  Chest:  Occasional wheeze + , clear otherwise   Cardiovascular:  RRR S1S2 heard, no murmurs or gallops  Abdomen:  Soft, undistended, non tender, no organomegaly, BS present  Extremities: chronic 1 + brawny edema to both LE  Healing crusted venous ulcer to left leg   Distal pulses well felt  Neurological : grossly normal        Medications:   Scheduled Medications:    ARIPiprazole  10 mg Oral Daily    aspirin EC  81 mg Oral Daily    atorvastatin  80 mg Oral Nightly    benztropine  1 mg Oral Nightly    buprenorphine-naloxone  1 Film Sublingual BID    clopidogrel  75 mg Oral Daily    lamoTRIgine  200 mg Oral BID    midodrine  10 mg Oral TID WC    pantoprazole  40 mg Oral BID AC    sodium chloride flush  10 mL Intravenous 2 times per day    enoxaparin  40 mg Subcutaneous Daily    insulin lispro  0-18 Units Subcutaneous TID WC    insulin lispro  0-9 Units Subcutaneous Nightly    potassium chloride  40 mEq Oral BID    influenza virus vaccine  0.5 mL Intramuscular Prior to discharge    insulin lispro  10 Units Subcutaneous TID WC    insulin glargine  30 Units Subcutaneous Nightly    ipratropium-albuterol  1 ampule Inhalation TID    busPIRone  15 mg Oral BID    insulin glargine  10 Units Subcutaneous Once    insulin lispro  10 Units Subcutaneous TID WC    [START ON 12/2/2020] sacubitril-valsartan  1 tablet Oral BID    potassium chloride  10 mEq Intravenous Once     I   dextrose       traZODone, sodium chloride flush, acetaminophen **OR** acetaminophen, polyethylene glycol, promethazine **OR** ondansetron, glucose, dextrose, glucagon (rDNA), dextrose, albuterol    Lab Data:  Recent Labs     11/30/20  1137 12/01/20  0504   WBC 15.4* 14.0*   HGB 16.8* 15.5   HCT 49.9* 47.2   MCV 85.9 88.1    222     Recent Labs     11/30/20  1137 11/30/20  2121 12/01/20  0504   * 126* 124*   K 2.3*  2.3* 2.9* 2.7*   CL 76* 77* 76*   CO2 38* 36* 35*   PHOS  --  3.1  --    BUN 63* 61* 63*   CREATININE 1.9* 1.5* 1.6*     Recent Labs     11/30/20  1137   TROPONINI 0.03*       Coagulation:   Lab Results   Component Value Date    INR 1.19 10/14/2020    APTT 31.5 08/18/2020     Cardiac markers:   Lab Results   Component Value Date    TROPONINI 0.03 11/30/2020         Lab Results   Component Value Date    ALT 15 11/30/2020    AST 19 11/30/2020     (H) 10/14/2020    ALKPHOS 222 (H) 11/30/2020    BILITOT 1.3 (H) 11/30/2020       Lab Results   Component Value Date    INR 1.19 (H) 10/14/2020    INR 1.23 (H) 08/18/2020    INR 1.31 (H) 08/12/2020    PROTIME 13.8 (H) 10/14/2020    PROTIME 14.2 (H) 08/18/2020    PROTIME 15.2 (H) 08/12/2020           CT CHEST WO CONTRAST   Final Result   1.  Improving with incompletely imaged small residual perinephric hematoma.           NM LUNG VENT/PERFUSION (VQ)   Final Result   Low probability for pulmonary embolus.           XR CHEST PORTABLE   Final Result   No active cardiopulmonary disease               V/q scan - low probability of PE    Stress test 8/20         Calculated LVEF is not reliable on study which is technically difficult,     estimate of 51% is likely an overestimate     Mid-distal anteroseptal/apical akinesis     Large fixed defects in anteroseptal/apical walls     Mild to moderate reversibility in lateral walls consistent with ischemia          Overall, this would be considered an abnormal high risk study      Echo 8/2020    Summary   Limited only f/u for LVEF and mitral regurgitation. Technically difficult examination. The left ventricular systolic function is severely reduced with an ejection   fraction of 25%. There is hypokinesis of the apex, apical lateral, apical septum,   anterioseptum and anterior walls. Compared to previous study from 7-1-2020 no changes noted in left   ventricular function. Moderate mitral regurgitation. Assessment & Plan:-      Hypokalemia, secondary to  Over diuresis  -2.3-->2.9-->2.7  -Was on aldactone 50, [id number] mg  and metolazone 2.5 mg daily  at home. -Cardiology recently changed diuresis regimen. -PRN K+ replacement, as well as 40 mEq PO BID  - recheck K+ at noon today     Dyspnea , likely COPD   - no PNA per imaging.  No fluid overload   - likely secondary to smoking.  -prednisone taper once sugars are improved  - v.q scan with low probability    Ischemic HD with chronic systolic HF  CAD s.p CABG ( 1/20 )    - was on entresto( half dose )  and diuretics at home   -Cr appears at baseline however clinically hypovolemic   - hypotensive today, 82/56, hold diuretics today  - NS bolus 500 ml given in ER and repeat bolus today     - resume plavix, not on BB for unclear reasons  - can retry as outpt with low dose coreg or toprol      Diabetes type 2 uncontrolled  - FSBS 400s   - high dose SSI + 10 units TID,  - on 30 units Tresiba at home    - add 10 units lantus today     Hypovolemic hyponatremia  - secondary to over diuresis   - 127-->126-->124  - NS infusion, as above  - nephrology consulted    Depression - resume home meds - abilify, buspar , lamictal    Recent left perinephric hematoma - improving per repeat imaging      Hx of Peripheral arterial disease    Hx of Pulmonary hypertension     Recommend smoking cessation     dvt prophylaxis    Piero Radford MD 12/1/2020 7:33 AM

## 2020-12-01 NOTE — PROGRESS NOTES
Blood pressure 65/44 after 500mL fluid bolus. MD aware.   -500mL fluid bolus reordered. IV potassium burning. MD aware, EfferK ordered.

## 2020-12-01 NOTE — PROGRESS NOTES
BUN 63, POCG 351, GFR 33      Wounds:  (scabs and scattered bruising)       Current Nutrition Therapies:    DIET CARB CONTROL;     Anthropometric Measures:  · Height: 5' 4.5\" (163.8 cm)  · Current Body Weight: 207 lb 8 oz (94.1 kg)(12/1/20)   · Admission Body Weight: 207 lb 8 oz (94.1 kg)(12/1/20)    · Usual Body Weight: 224 lb 8 oz (101.8 kg)(9/9/20)     · Ideal Body Weight: 123 lbs; % Ideal Body Weight 168.7 %   · BMI: 35.1  · BMI Categories: Obese Class 2 (BMI 35.0 -39.9)       Nutrition Diagnosis:   · Altered nutrition-related lab values related to endocrine dysfuntion, renal dysfunction, cardiac dysfunction as evidenced by poor intake prior to admission, weight loss 7.5% in 3 months, lab values      Nutrition Interventions:   Food and/or Nutrient Delivery:  Continue Current Diet  Nutrition Education/Counseling:  No recommendation at this time   Coordination of Nutrition Care:  Continue to monitor while inpatient    Goals:  pt will consume 75% or greater of meals on CCC-4 diet order x 3 meals per day w/o BS > 180 mg/dl       Nutrition Monitoring and Evaluation:   Behavioral-Environmental Outcomes:  Readiness for Change, Beliefs and Attitutes   Food/Nutrient Intake Outcomes:  Food and Nutrient Intake, IVF Intake  Physical Signs/Symptoms Outcomes:  Biochemical Data, Meal Time Behavior, Nutrition Focused Physical Findings, Skin, Weight, Hemodynamic Status, Fluid Status or Edema     Discharge Planning:    Continue current diet     Electronically signed by Mary Anne Hodges on 12/1/20 at 12:18 PM EST    Contact: [phone]

## 2020-12-01 NOTE — PROGRESS NOTES
Patient admitted to room 302  from ER. Patient oriented to room, call light, bed rails, phone, lights and bathroom. Patient instructed about the schedule of the day including: vital sign frequency, lab draws, possible tests, frequency of MD and staff rounds, daily weights, I &O's and prescribed diet. No bed alarm in place (pt low fall risk), #21 MXTEL Telemetry box in place, patient aware of placement and reason. Bed locked, in lowest position, side rails up 2/4, call light within reach. Recliner Assessment  Patient is able to demonstrate the ability to move from a reclining position to an upright position within the recliner. 4 Eyes Skin Assessment     The patient is being assess for   Admission    I agree that 2 RN's have performed a thorough Head to Toe Skin Assessment on the patient. ALL assessment sites listed below have been assessed. Areas assessed by both nurses:   [x]   Head, Face, and Ears   [x]   Shoulders, Back, and Chest, Abdomen  [x]   Arms, Elbows, and Hands    [x]   Coccyx, Sacrum, and Ischium  [x]   Legs, Feet, and Heels        Scars & scabs on chest, softball size bruise on abdomen, scattered scabs on both arms, slight redness in aaron area, Heels red and blanchable, BLE +2 non-pitting edema, healing wound on lower left anterior leg (size of quarter), healing wound on right lower posterior right leg (dime size) Photos taken. **SHARE this note so that the co-signing nurse is able to place an eSignature**    Co-signer eSignature: Electronically signed by Kanika Chino RN on 12/1/20 at 7:40 AM EST    Does the Patient have Skin Breakdown?   Yes LDA WOUND CARE was Initiated documentation include the Aaron-wound, Wound Assessment, Measurements, Dressing Treatment, Drainage, and Color\",          Vivek Prevention initiated:  Yes   Wound Care Orders initiated:  Yes      [address]  nurse consulted for Pressure Injury (Stage 3,4, Unstageable, DTI, NWPT, Complex wounds)and New or Established Ostomies:  Yes      Primary Nurse eSignature: Electronically signed by Tere Franco RN on 12/1/20 at 1:21 AM EST

## 2020-12-01 NOTE — PROGRESS NOTES
RESPIRATORY THERAPY ASSESSMENT    Name:  Rona Concepcion  Medical Record Number:  [id number]  Age: 60 y.o. Gender: female  : 1965  Today's Date:  2020  Room:  /0302-01    Assessment     Is the patient being admitted for a COPD or Asthma exacerbation? No   (If yes the patient will be seen every 4 hours for the first 24 hours and then reassessed)    Patient Admission Diagnosis      Allergies  Allergies   Allergen Reactions    Lisinopril Other (See Comments)     Severe hypotension       Minimum Predicted Vital Capacity:               Actual Vital Capacity:                    Pulmonary History:CHF/Pulmonary Edema  Home Oxygen Therapy:  room air  Home Respiratory Therapy:Albuterol   Current Respiratory Therapy:  duoneb q4wa  Treatment Type: HHN  Medications: Albuterol/Ipratropium    Respiratory Severity Index(RSI)   Patients with orders for inhalation medications, oxygen, or any therapeutic treatment modality will be placed on Respiratory Protocol. They will be assessed with the first treatment and at least every 72 hours thereafter. The following severity scale will be used to determine frequency of treatment intervention.     Smoking History: Pulmonary Disease or Smoking History, Greater than 15 pack year = 2    Social History  Social History     Tobacco Use    Smoking status: Current Every Day Smoker     Packs/day: 0.50     Years: 30.00     Pack years: 15.00     Types: Cigarettes    Smokeless tobacco: Never Used   Substance Use Topics    Alcohol use: No     Comment: quit      Drug use: Never       Recent Surgical History: None = 0  Past Surgical History  Past Surgical History:   Procedure Laterality Date    ARTERIAL BYPASS SURGRY Left 2016    Axillary/Subclavian to Brachial Bypass w/reversed SVG    CARDIAC CATHETERIZATION  2018    Dr. Carly Harris - at [address]  2020    Dr. Darcy Bhakta      pancreatitis post surgery    CORONARY ANGIOPLASTY WITH STENT PLACEMENT  03/16/2018    MELODY- 3.5 x 38 and 3.5 x 20 to Cx    CORONARY ANGIOPLASTY WITH STENT PLACEMENT  01/03/2018    MELODY- 3.0 x 38 and 2.75 x 26 and 2.75 x 8 and 2.75 x 22 to the LAD    CORONARY ANGIOPLASTY WITH STENT PLACEMENT  10/07/2019    MELODY- 2.5 x 8 to [address] GRAFT N/A 1/27/2020    CORONARY ARTERY BYPASS GRAFTING X 1, ON PUMP BEATING HEART, INTERNAL MAMMARY ARTERY TO LEFT ANTERIOR DESCENDING ARTERY, VAS CATH INSERTION, URI, PLATELET GEL APPLICATION, 5 LEVEL BILATERAL INTERCOSTAL NERVE BLOCK, STERNAL PLATING performed by Tyson Palacios MD at [address] [place] [address] Right 5/16/2019    fem-pop, performed by Daniel Gaspar MD at [address] [place] [address]  02/14/2019    CardioMEMs insertion for CHF    ROTATOR CUFF REPAIR Left 04/22/2016    TONSILLECTOMY      TRANSESOPHAGEAL ECHOCARDIOGRAM  01/26/2020    TRANSLUMINAL ANGIOPLASTY Left 10/08/2019    with stenting, at [facility]    TRANSLUMINAL ANGIOPLASTY Left 04/29/2020    of the [facility] re-occlusion    TUMOR EXCISION      benign tumors X 2 chest & axilla    UPPER GASTROINTESTINAL ENDOSCOPY  4/25/2013    BX barretts, HH, gastritis    UPPER GASTROINTESTINAL ENDOSCOPY  12/10/2015    UPPER GASTROINTESTINAL ENDOSCOPY  01/06/2017    Gastritis    VASCULAR SURGERY Left 12/08/2015    upper extremity and mesenteric angiogram (diagnostic only)    VASCULAR SURGERY Bilateral 07/07/2020    diagnostic lower extremity angiogram only    VASCULAR SURGERY Left 08/04/2020    angioplasty and stent of renal artery    VASCULAR SURGERY Left 08/05/2020    coil embolization of branch renal artery vessel for bleeding    VASCULAR SURGERY Left 09/01/2020    angioplasty and stenting of subclavian artery       Level of Consciousness: Alert, Oriented, and Cooperative = 0    Level of Activity: Walking with assistance = 1    Respiratory Pattern: Regular Pattern; RR 8-20 = 0    Breath Sounds: Absent bilaterally and/or with wheezes = 3    Sputum   ,  ,    Cough: Strong, spontaneous, non-productive = 0    Vital Signs   /88   Pulse 112   Temp 99 °F (37.2 °C) (Oral)   Resp 20   Ht 5' 4.5\" (1.638 m)   Wt 207 lb 8 oz (94.1 kg)   LMP 10/24/2012   SpO2 91%   BMI 35.07 kg/m²   SPO2 (COPD values may differ): Greater than or equal to 92% on room air = 0    Peak Flow (asthma only): not applicable = 0    RSI: 5-6 q4 prn        Plan       Goals: mobilize retained secretions, volume expansion and improve oxygenation    Patient/caregiver was educated on the proper method of use for Respiratory Care Devices:  Yes      Level of patient/caregiver understanding able to:   ? Verbalize understanding   ? Demonstrate understanding       ? Teach back        ? Needs reinforcement       ? No available caregiver               ? Other:     Response to education:  Good     Is patient being placed on Home Treatment Regimen? No     Does the patient have everything they need prior to discharge? NA     Comments: pt assessed and chart reviewed    Plan of Care: duoneb tid for wheezes    Electronically signed by Ellie Handley RCP on 12/1/2020 at 3:29 AM    Respiratory Protocol Guidelines     1. Assessment and treatment by Respiratory Therapy will be initiated for medication and therapeutic interventions upon initiation of aerosolized medication. 2. Physician will be contacted for respiratory rate (RR) greater than 35 breaths per minute. Therapy will be held for heart rate (HR) greater than 140 beats per minute, pending direction from physician. 3. Bronchodilators will be administered via Metered Dose Inhaler (MDI) with spacer when the following criteria are met:  a. Alert and cooperative     b. HR < 140 bpm  c. RR < 30 bpm                d. Can demonstrate a 2-3 second inspiratory hold  4. Bronchodilators will be administered via Hand Held Nebulizer OFE [facility]) to patients when ANY of the following criteria are met  a.  Incognizant or uncooperative          b. Patients treated with HHN at Home        c. Unable to demonstrate proper use of MDI with spacer     d. RR > 30 bpm   5. Bronchodilators will be delivered via Metered Dose Inhaler (MDI), HHN, Aerogen to intubated patients on mechanical ventilation. 6. Inhalation medication orders will be delivered and/or substituted as outlined below. Aerosolized Medications Ordering and Administration Guidelines:    1. All Medications will be ordered by a physician, and their frequency and/or modality will be adjusted as defined by the patients Respiratory Severity Index (RSI) score. 2. If the patient does not have documented COPD, consider discontinuing anticholinergics when RSI is less than 9.  3. If the bronchospasm worsens (increased RSI), then the bronchodilator frequency can be increased to a maximum of every 4 hours. If greater than every 4 hours is required, the physician will be contacted. 4. If the bronchospasm improves, the frequency of the bronchodilator can be decreased, based on the patient's RSI, but not less than home treatment regimen frequency. 5. Bronchodilator(s) will be discontinued if patient has a RSI less than 9 and has received no scheduled or as needed treatment for 72  Hrs. Patients Ordered on a Mucolytic Agent:    1. Must always be administered with a bronchodilator. 2. Discontinue if patient experiences worsened bronchospasm, or secretions have lessened to the point that the patient is able to clear them with a cough. Anti-inflammatory and Combination Medications:    1. If the patient lacks prior history of lung disease, is not using inhaled anti-inflammatory medication at home, and lacks wheezing by examination or by history for at least 24 hours, contact physician for possible discontinuation.

## 2020-12-02 NOTE — PROGRESS NOTES
Handoff report given to [facility]. Pt is stable at this time. Care transferred.

## 2020-12-02 NOTE — PROGRESS NOTES
Perfect serve sent to [facility]:    \"Pt has been N/V/D all day today and unable to keep anything down. Her QTC is elevated so I'm unable to admin Phenergan or Zofran. Can I please get an order for IV Compazine as soon as possible?  Please and thank you\"

## 2020-12-02 NOTE — PROGRESS NOTES
Last CardioMEMS readings on 11/26/2020 with PAS 46, PAD 21, OFE 31.   This is her normal baseline range.

## 2020-12-02 NOTE — PROGRESS NOTES
Perfect serve sent to [facility] for pts K+ 2.76, new orders placed: potassium chloride 40 mEq in sodium chloride 0.9 % 500 mL infusion. Awaiting from Pharmacy.

## 2020-12-02 NOTE — PROGRESS NOTES
Called to room, patient was requesting nausea medication. When RN entered room patient was snoring and asleep. Will let patient rest and attempt AM meds and assessment later.

## 2020-12-02 NOTE — PROGRESS NOTES
[facility]    Follow up Note           Reason for Consult: SHAUNNA  Requesting Physician:  Dr. Armando Adamson for    Sub/interval history  Was nauseated and vomited this am but since then has been able to tolerate her break fast  Bp better      Last 24 h uop 350 ml, making decent urine this am  wt 218 (admission wt 207)    ROS: No chest pain/shortness of breath/fever  PSFH: No visitor    Scheduled Meds:   [START ON 12/3/2020] ARIPiprazole  10 mg Oral Daily    potassium chloride  20 mEq Oral Once    lamoTRIgine  100 mg Oral BID    aspirin EC  81 mg Oral Daily    atorvastatin  80 mg Oral Nightly    benztropine  1 mg Oral Nightly    buprenorphine-naloxone  1 Film Sublingual BID    clopidogrel  75 mg Oral Daily    midodrine  10 mg Oral TID WC    pantoprazole  40 mg Oral BID AC    sodium chloride flush  10 mL Intravenous 2 times per day    enoxaparin  40 mg Subcutaneous Daily    insulin lispro  0-18 Units Subcutaneous TID WC    insulin lispro  0-9 Units Subcutaneous Nightly    influenza virus vaccine  0.5 mL Intramuscular Prior to discharge    insulin glargine  30 Units Subcutaneous Nightly    ipratropium-albuterol  1 ampule Inhalation TID    busPIRone  15 mg Oral BID    insulin lispro  10 Units Subcutaneous TID      Continuous Infusions:   dextrose      sodium chloride 150 mL/hr at 12/02/20 1044     PRN Meds:.prochlorperazine, traZODone, sodium chloride flush, acetaminophen **OR** acetaminophen, polyethylene glycol, glucose, dextrose, glucagon (rDNA), dextrose, albuterol    History of Present Illness on 12/1/2020:    60 y.o. yo female with PMH of CHF with reduced EF who is admitted for hyponatremia, hyperkalemia, SHAUNNA  Patient has not been feeling well for last few days  She was seen at cardiology office and was asked to come to the emergency room because of numbness of breath and wheezing.   She was noted to have to be hypokalemic and hyponatremic along with SHAUNNA  She has been admitted

## 2020-12-02 NOTE — PLAN OF CARE
Problem: Safety:  Goal: Free from accidental physical injury  Description: Free from accidental physical injury  Outcome: Ongoing  Goal: Free from intentional harm  Description: Free from intentional harm  Outcome: Ongoing     Problem: Nutrition  Goal: Optimal nutrition therapy  Outcome: Ongoing  Goal: Understanding of nutritional guidelines  Outcome: Ongoing

## 2020-12-02 NOTE — PROGRESS NOTES
IM Progress Note    Admit Date:  11/30/2020  0    Interval history:  Sent in by cardiology office for dyspnea  Workup showed no pna or fluid overload but electrolyte abn   Of severe hyponatremia, hypokalemia       12/1: seen sitting up in bed, slightly wheezy this am.     Had severe hypotension overnight, requiring multiple fluid boluses    Subjective:  Ms. Hilario Murray reports sob resolved, had emesis yesterday and does not feel well  Some better today and able to keep breakfast down today     Objective:   BP (!) 98/57   Pulse 106   Temp 98.8 °F (37.1 °C) (Oral)   Resp 18   Ht 5' 4.5\" (1.638 m)   Wt 218 lb 4.8 oz (99 kg)   LMP 10/24/2012   SpO2 95%   BMI 36.89 kg/m²       Intake/Output Summary (Last 24 hours) at 12/2/2020 0738  Last data filed at 12/2/2020 0654  Gross per 24 hour   Intake 5803 ml   Output 350 ml   Net 5453 ml       Physical Exam:        General: middle aged female obese, chronically sick appearing    Awake, alert and oriented.  Appears to be not in any distress  Mucous Membranes:  Pink , anicteric  Neck: No JVD, no carotid bruit, no thyromegaly  Chest:  Clear dinora air entry no wheeze  Cardiovascular:  RRR S1S2 heard, no murmurs or gallops  Abdomen:  Soft, undistended, non tender, no organomegaly, BS present  Extremities: chronic 1 + brawny edema to both LE  Healing crusted venous ulcers to both legs  Distal pulses well felt  Neurological : grossly normal        Medications:   Scheduled Medications:    IV infusion builder   Intravenous Daily    ARIPiprazole  10 mg Oral Daily    aspirin EC  81 mg Oral Daily    atorvastatin  80 mg Oral Nightly    benztropine  1 mg Oral Nightly    buprenorphine-naloxone  1 Film Sublingual BID    clopidogrel  75 mg Oral Daily    midodrine  10 mg Oral TID WC    pantoprazole  40 mg Oral BID AC    sodium chloride flush  10 mL Intravenous 2 times per day    enoxaparin  40 mg Subcutaneous Daily    insulin lispro  0-18 Units Subcutaneous TID WC    insulin lispro 0-9 Units Subcutaneous Nightly    potassium chloride  40 mEq Oral BID    influenza virus vaccine  0.5 mL Intramuscular Prior to discharge    insulin glargine  30 Units Subcutaneous Nightly    ipratropium-albuterol  1 ampule Inhalation TID    busPIRone  15 mg Oral BID    insulin lispro  10 Units Subcutaneous TID WC    lamoTRIgine  100 mg Oral BID    sodium chloride  1,000 mL Intravenous Once    sodium chloride  1,000 mL Intravenous Once     I   dextrose      sodium chloride 150 mL/hr at 12/02/20 0651     prochlorperazine, traZODone, sodium chloride flush, acetaminophen **OR** acetaminophen, polyethylene glycol, glucose, dextrose, glucagon (rDNA), dextrose, albuterol    Lab Data:  Recent Labs     11/30/20  1137 12/01/20  0504   WBC 15.4* 14.0*   HGB 16.8* 15.5   HCT 49.9* 47.2   MCV 85.9 88.1    222     Recent Labs     11/30/20  2121  12/01/20  1153 12/01/20  1644 12/02/20  0003   *   < > 122* 127* 130*   K 2.9*   < > 2.7* 2.6* 2.8*   CL 77*   < > 76* 80* 81*   CO2 36*   < > 36* 36* 35*   PHOS 3.1  --   --   --   --    BUN 61*   < > 66* 70* 64*   CREATININE 1.5*   < > 2.4* 3.0* 2.5*    < > = values in this interval not displayed. Recent Labs     11/30/20  1137   TROPONINI 0.03*       Coagulation:   Lab Results   Component Value Date    INR 1.19 10/14/2020    APTT 31.5 08/18/2020     Cardiac markers:   Lab Results   Component Value Date    TROPONINI 0.03 11/30/2020         Lab Results   Component Value Date    ALT 15 11/30/2020    AST 19 11/30/2020     (H) 10/14/2020    ALKPHOS 222 (H) 11/30/2020    BILITOT 1.3 (H) 11/30/2020       Lab Results   Component Value Date    INR 1.19 (H) 10/14/2020    INR 1.23 (H) 08/18/2020    INR 1.31 (H) 08/12/2020    PROTIME 13.8 (H) 10/14/2020    PROTIME 14.2 (H) 08/18/2020    PROTIME 15.2 (H) 08/12/2020           CT CHEST WO CONTRAST   Final Result   1.  Improving with incompletely imaged small residual perinephric hematoma.           NM LUNG VENT/PERFUSION (VQ)   Final Result   Low probability for pulmonary embolus.           XR CHEST PORTABLE   Final Result   No active cardiopulmonary disease               V/q scan - low probability of PE    Stress test 8/20         Calculated LVEF is not reliable on study which is technically difficult,     estimate of 51% is likely an overestimate     Mid-distal anteroseptal/apical akinesis     Large fixed defects in anteroseptal/apical walls     Mild to moderate reversibility in lateral walls consistent with ischemia          Overall, this would be considered an abnormal high risk study      Echo 8/2020    Summary   Limited only f/u for LVEF and mitral regurgitation. Technically difficult examination. The left ventricular systolic function is severely reduced with an ejection   fraction of 25%. There is hypokinesis of the apex, apical lateral, apical septum,   anterioseptum and anterior walls. Compared to previous study from 7-1-2020 no changes noted in left   ventricular function. Moderate mitral regurgitation. Assessment & Plan:-      Hypokalemia, secondary to  Over diuresis  -2.3-->2.9-->2.7  -Was on aldactone 50, [id number] mg  and metolazone 2.5 mg daily  at home. -Cardiology recently increased diuresis regimen. -PRN K+ replacement, as well as 40 mEq PO BID  - recheck K+ improving now   - nephrology involved     ARF with CKD 3    -Cr worsened with severe hypotension- likely pre renal and ATN  - multiple fluid boluses given and improved BP now  - roche placed, nephro consulted. - improving UOP and improved BP   - hold diuretics and entresto     Hypovolemic hyponatremia  - secondary to over diuresis   - 127-->126-->124 >132 after IVF  - nephrology consulted      Dyspnea , likely COPD   - no PNA per imaging.  No fluid overload   - likely secondary to smoking.  - v.q scan with low probability  - dsypnea resolved with no intervention     Ischemic HD with chronic systolic HF  CAD s.p CABG ( 1/20 )    - was on entresto( half dose )  and diuretics at home   - was severely hypotensive and hypovolemic on adm- see below  - resume plavix, not on BB for unclear reasons  - can retry as outpt with low dose coreg or toprol once acute issues resolve      Diabetes type 2 uncontrolled  - FSBS 400s   - high dose SSI + 10 units TID,  - on 30 units Tresiba at home    - add 10 units lantus today     Depression - resume home meds - abilify, buspar , lamictal    Recent left perinephric hematoma - improving per repeat imaging      Hx of Peripheral arterial disease    Hx of Pulmonary hypertension     Recommend smoking cessation     dvt prophylaxis    Carter Osborne MD 12/2/2020 7:38 AM

## 2020-12-02 NOTE — PROGRESS NOTES
Pt visibly feeling unwell this evening. Pt lying in bed moaning c/o N/V/D. Pt vomited x 2 while RN was in pts room. Perfect serve sent to [facility] asking for Compazine, Phenergan and Zofran held due to QTC. Shift assessment complete and all meds given per MAR. Pts insulin held, , and due to Pt not eating and vomiting all PO meds refused by patient, and some held due to soft blood pressures. Pt stated she just wants to sleep. Will to continue to monitor Blood pressure. Last BP was 100/60. Bed in lowest position and call light within reach with all belongings. Emesis bags and warm blankets provided. Will continue to monitor and assess.

## 2020-12-03 NOTE — PROGRESS NOTES
RESPIRATORY THERAPY ASSESSMENT    Name:  Marc Minor  Medical Record Number:  [id number]  Age: 60 y.o. Gender: female  : 1965  Today's Date:  12/3/2020  Room:  /0302-01    Assessment     Is the patient being admitted for a COPD or Asthma exacerbation? No   (If yes the patient will be seen every 4 hours for the first 24 hours and then reassessed)    Patient Admission Diagnosis      Allergies  Allergies   Allergen Reactions    Lisinopril Other (See Comments)     Severe hypotension       Minimum Predicted Vital Capacity:     na          Actual Vital Capacity:      na              Pulmonary History:CHF/Pulmonary Edema, current smoker  Home Oxygen Therapy:  room air  Home Respiratory Therapy: albuterol mdi bid   Current Respiratory Therapy: Duoneb tid  Treatment Type: HHN  Medications: Albuterol/Ipratropium    Respiratory Severity Index(RSI)   Patients with orders for inhalation medications, oxygen, or any therapeutic treatment modality will be placed on Respiratory Protocol. They will be assessed with the first treatment and at least every 72 hours thereafter. The following severity scale will be used to determine frequency of treatment intervention.     Smoking History: Pulmonary Disease or Smoking History, Greater than 15 pack year = 2    Social History  Social History     Tobacco Use    Smoking status: Current Every Day Smoker     Packs/day: 0.50     Years: 30.00     Pack years: 15.00     Types: Cigarettes    Smokeless tobacco: Never Used   Substance Use Topics    Alcohol use: No     Comment: quit      Drug use: Never       Recent Surgical History: None = 0  Past Surgical History  Past Surgical History:   Procedure Laterality Date    ARTERIAL BYPASS SURGRY Left 2016    Axillary/Subclavian to Brachial Bypass w/reversed SVG    CARDIAC CATHETERIZATION  2018    Dr. Susan Bueno - at [address]  2020    Dr. Luciana Christian CHOLECYSTECTOMY      pancreatitis post surgery    CORONARY ANGIOPLASTY WITH STENT PLACEMENT  03/16/2018    MELODY- 3.5 x 38 and 3.5 x 20 to Cx    CORONARY ANGIOPLASTY WITH STENT PLACEMENT  01/03/2018    MELODY- 3.0 x 38 and 2.75 x 26 and 2.75 x 8 and 2.75 x 22 to the LAD    CORONARY ANGIOPLASTY WITH STENT PLACEMENT  10/07/2019    MELODY- 2.5 x 8 to [address] GRAFT N/A 1/27/2020    CORONARY ARTERY BYPASS GRAFTING X 1, ON PUMP BEATING HEART, INTERNAL MAMMARY ARTERY TO LEFT ANTERIOR DESCENDING ARTERY, VAS CATH INSERTION, URI, PLATELET GEL APPLICATION, 5 LEVEL BILATERAL INTERCOSTAL NERVE BLOCK, STERNAL PLATING performed by Carmina Bhakta MD at [address] Right 5/16/2019    fem-pop, performed by Linh Sánchez MD at [address]  02/14/2019    CardioMEMs insertion for CHF    ROTATOR CUFF REPAIR Left 04/22/2016    TONSILLECTOMY      TRANSESOPHAGEAL ECHOCARDIOGRAM  01/26/2020    TRANSLUMINAL ANGIOPLASTY Left 10/08/2019    with stenting, at [facility]    TRANSLUMINAL ANGIOPLASTY Left 04/29/2020    of the [facility] re-occlusion    TUMOR EXCISION      benign tumors X 2 chest & axilla    UPPER GASTROINTESTINAL ENDOSCOPY  4/25/2013    BX barretts, HH, gastritis    UPPER GASTROINTESTINAL ENDOSCOPY  12/10/2015    UPPER GASTROINTESTINAL ENDOSCOPY  01/06/2017    Gastritis    VASCULAR SURGERY Left 12/08/2015    upper extremity and mesenteric angiogram (diagnostic only)    VASCULAR SURGERY Bilateral 07/07/2020    diagnostic lower extremity angiogram only    VASCULAR SURGERY Left 08/04/2020    angioplasty and stent of renal artery    VASCULAR SURGERY Left 08/05/2020    coil embolization of branch renal artery vessel for bleeding    VASCULAR SURGERY Left 09/01/2020    angioplasty and stenting of subclavian artery       Level of Consciousness: Alert, Oriented, and Cooperative = 0    Level of Activity: Walking unassisted = 0    Respiratory Pattern: Regular Pattern; RR 8-20 = 0    Breath Sounds: Diminshed bilaterally and/or crackles = 2    Sputum   ,  , Sputum How Obtained: None  Cough: Strong, spontaneous, non-productive = 0    Vital Signs   /69   Pulse 95   Temp 96.9 °F (36.1 °C) (Oral)   Resp 18   Ht 5' 4.5\" (1.638 m)   Wt 218 lb 3.2 oz (99 kg)   LMP 10/24/2012   SpO2 98%   BMI 36.88 kg/m²   SPO2 (COPD values may differ): Greater than or equal to 92% on room air = 0    Peak Flow (asthma only): not applicable = 0    RSI: 0-4 = See once and convert to home regimen or discontinue        Plan       Goals: medication delivery, mobilize retained secretions, volume expansion and improve oxygenation    Patient/caregiver was educated on the proper method of use for Respiratory Care Devices:  Yes      Level of patient/caregiver understanding able to:   ? Verbalize understanding   ? Demonstrate understanding       ? Teach back        ? Needs reinforcement       ? No available caregiver               ? Other:     Response to education:  Good     Is patient being placed on Home Treatment Regimen? Yes     Does the patient have everything they need prior to discharge? Yes     Comments: patient assessed/interviewed. Plan of Care: change to bid albuterol/atrovent mdi    Electronically signed by Rona Aguilar RCP on 12/3/2020 at 6:57 AM    Respiratory Protocol Guidelines     1. Assessment and treatment by Respiratory Therapy will be initiated for medication and therapeutic interventions upon initiation of aerosolized medication. 2. Physician will be contacted for respiratory rate (RR) greater than 35 breaths per minute. Therapy will be held for heart rate (HR) greater than 140 beats per minute, pending direction from physician. 3. Bronchodilators will be administered via Metered Dose Inhaler (MDI) with spacer when the following criteria are met:  a.  Alert and cooperative     b. HR < 140 bpm  c. RR < 30 bpm                d. Can demonstrate a 2-3 second inspiratory hold  4. Bronchodilators will be administered via Hand Held Nebulizer [facility]) to patients when ANY of the following criteria are met  a. Incognizant or uncooperative          b. Patients treated with HHN at Home        c. Unable to demonstrate proper use of MDI with spacer     d. RR > 30 bpm   5. Bronchodilators will be delivered via Metered Dose Inhaler (MDI), HHN, Aerogen to intubated patients on mechanical ventilation. 6. Inhalation medication orders will be delivered and/or substituted as outlined below. Aerosolized Medications Ordering and Administration Guidelines:    1. All Medications will be ordered by a physician, and their frequency and/or modality will be adjusted as defined by the patients Respiratory Severity Index (RSI) score. 2. If the patient does not have documented COPD, consider discontinuing anticholinergics when RSI is less than 9.  3. If the bronchospasm worsens (increased RSI), then the bronchodilator frequency can be increased to a maximum of every 4 hours. If greater than every 4 hours is required, the physician will be contacted. 4. If the bronchospasm improves, the frequency of the bronchodilator can be decreased, based on the patient's RSI, but not less than home treatment regimen frequency. 5. Bronchodilator(s) will be discontinued if patient has a RSI less than 9 and has received no scheduled or as needed treatment for 72  Hrs. Patients Ordered on a Mucolytic Agent:    1. Must always be administered with a bronchodilator. 2. Discontinue if patient experiences worsened bronchospasm, or secretions have lessened to the point that the patient is able to clear them with a cough. Anti-inflammatory and Combination Medications:    1. If the patient lacks prior history of lung disease, is not using inhaled anti-inflammatory medication at home, and lacks wheezing by examination or by history for at least 24 hours, contact physician for possible discontinuation.

## 2020-12-03 NOTE — DISCHARGE SUMMARY
Name:  Jaden Martin  Room:  /1689-49  MRN:    [id number]    Discharge Summary      This discharge summary is in conjunction with a complete physical exam done on the day of discharge. Attending Physician: Dr. Jacinda Waggoner  Discharging Physician: Dr. Verner Place: 11/30/2020  Discharge:   12/3/2020    HPI:  60 y.o. female with a history of ischemic cardiomyopathy who was sent to the emergency department from the cardiology office. She went for routine cardiology visit and was found to be short of breath with wheezing and cough. The patient states that over the last couple of months that she has been treated for a pneumonia. She states that she is completed more than 2 courses of antibiotics but continues to have shortness of breath, wheezing and cough. She denies any fevers or chills or sick exposures to COVID-19. She does not think she is volume overloaded as [id number] she thinks that she is very dry. In the emergency department work-up was remarkable for severe hypokalemia. Imaging was negative for pneumonitis. She will be admitted for her electrolyte abnormality. Diagnoses this Admission and Hospital Course   Hypokalemia, secondary to  Over diuresis  -2.3-->2.9-->2.7  -Was on aldactone 50, [id number] mg  and metolazone 2.5 mg daily  at home. -Cardiology recently increased diuresis regimen.      -PRN K+ replacement, as well as 40 mEq PO BID  - recheck K+ improving now   - nephrology involved      ARF with CKD 3     -Cr worsened with severe hypotension- likely pre renal and ATN  - multiple fluid boluses given and improved BP now  - roche placed, nephro consulted.    - improving UOP and improved BP with aggressive fluid boluses  - held diuretics and entresto   - creatinine back down to 1.4  - resumed home diuretics as mentioned below        Hypovolemic hyponatremia  - secondary to over diuresis   - 127-->126-->124 >132 after IVF  - nephrology consulted  - 127 today        Dyspnea , likely COPD   - no PNA per imaging. No fluid overload   - likely secondary to smoking.  - v.q scan with low probability  - dsypnea resolved with no intervention      Ischemic HD with chronic systolic HF  CAD s.p CABG ( 1/20 )     - was on entresto (half dose) and diuretics at home   - was severely hypotensive and hypovolemic on adm- see below  - resume plavix, not on BB for unclear reasons  - can retry as outpt with low dose coreg or toprol once acute issues resolve        Diabetes type 2 uncontrolled  - FSBS 400s   - high dose SSI + 10 units TID,  - on 30 units Tresiba at home    - improved sugars      Depression - resumed home meds - abilify, buspar, lamictal     Recent left perinephric hematoma - improving per repeat imaging     Dc home today         Recommend smoking cessation      Cut down demadex to 50 mg and continue aldactone 50 mg from tomorrow     Resume metolozone every other day from Saturday   Resume entresto half tab from Sunday      Cut down potassium to half      BMP in 1 week     Stop victoza for a week due to nausea     F/w cardiology in 2 weeks    Procedures (Please Review Full Report for Details)  N/A    Consults    Nephrology       Physical Exam at Discharge:    /66   Pulse 109   Temp 97.7 °F (36.5 °C) (Oral)   Resp 18   Ht 5' 4.5\" (1.638 m)   Wt 218 lb 3.2 oz (99 kg)   LMP 10/24/2012   SpO2 94%   BMI 36.88 kg/m²     See today's progress note    CBC:   Recent Labs     12/01/20  0504 12/02/20  0730 12/03/20  0754   WBC 14.0* 14.5* 11.4*   HGB 15.5 14.5 14.0   HCT 47.2 44.5 42.8   MCV 88.1 87.9 87.8    197 189     BMP:   Recent Labs     11/30/20  2121  12/02/20  0730 12/02/20 2029 12/03/20  0754   *   < > 132* 129* 127*   K 2.9*   < > 3.2* 3.3* 3.3*   CL 77*   < > 87* 89* 88*   CO2 36*   < > 35* 32 29   PHOS 3.1  --   --   --   --    BUN 61*   < > 55* 48* 39*   CREATININE 1.5*   < > 1.8* 1.4* 1.2*    < > = values in this interval not displayed.      LIVER PROFILE: Recent Labs     11/30/20  1137   AST 19   ALT 15   BILITOT 1.3*   ALKPHOS 222*     UA:  Recent Labs     11/30/20  1230   COLORU Yellow   PHUR 5.0   WBCUA 0-2   RBCUA None seen   BACTERIA 2+*   CLARITYU Clear   SPECGRAV 1.025   LEUKOCYTESUR Negative   UROBILINOGEN 0.2   BILIRUBINUR Negative   BLOODU Negative   GLUCOSEU Negative       CARDIAC ENZYMES  Recent Labs     11/30/20  1137   TROPONINI 0.03*       CULTURES  Urine: NGTD  COVID: not detected    RADIOLOGY  CT CHEST WO CONTRAST   Final Result   1. Improving with incompletely imaged small residual perinephric hematoma. NM LUNG VENT/PERFUSION (VQ)   Final Result   Low probability for pulmonary embolus. XR CHEST PORTABLE   Final Result   No active cardiopulmonary disease           Echo 8/2020  Summary   Limited only f/u for LVEF and mitral regurgitation.   Technically difficult examination.  Sunil Shanks left ventricular systolic function is severely reduced with an ejection   fraction of 25%.   There is hypokinesis of the apex, apical lateral, apical septum,   anterioseptum and anterior walls.   Compared to previous study from 7-1-2020 no changes noted in left   ventricular function.   Moderate mitral regurgitation.       Discharge Medications     Medication List      CHANGE how you take these medications    potassium chloride 20 MEQ extended release tablet  Commonly known as:  KLOR-CON M  Take 1 tablet by mouth 3 times daily  What changed:  how much to take     torsemide 100 MG tablet  Commonly known as:  DEMADEX  Take 0.5 tablets by mouth daily  What changed:  how much to take        CONTINUE taking these medications    albuterol sulfate  (90 Base) MCG/ACT inhaler  Inhale 2 puffs into the lungs every 6 hours as needed for Wheezing or Shortness of Breath     ARIPiprazole 10 MG tablet  Commonly known as:  ABILIFY     aspirin EC 81 MG EC tablet  Take 1 tablet by mouth daily     atorvastatin 80 MG tablet  Commonly known as:  LIPITOR  Take 1 tablet by mouth nightly     B-D ULTRAFINE III SHORT PEN 31G X 8 MM Misc  Generic drug:  Insulin Pen Needle  Five shots a day     benztropine 1 MG tablet  Commonly known as:  COGENTIN     busPIRone 30 MG tablet  Commonly known as:  BUSPAR     clopidogrel 75 MG tablet  Commonly known as:  PLAVIX  TAKE 1 TABLET BY MOUTH EVERY DAY     DOXEPIN HCL PO     Entresto 49-51 MG per tablet  Generic drug:  sacubitril-valsartan     FreeStyle Freedom Lite w/Device Kit  Use to test glucose 4 times a day     FREESTYLE LITE strip  Generic drug:  blood glucose test strips  USE TO CHECK BLOOD GLUCOSE LEVELS FOUR TIMES DAILY     INSULIN SYRINGE .5CC/29G 29G X 1/2\" 0.5 ML Misc  1 each by Does not apply route daily     lamoTRIgine 150 MG tablet  Commonly known as:  LAMICTAL     magnesium oxide 400 (240 Mg) MG tablet  Commonly known as:  MAG-OX  TAKE 1 TABLET ONCE DAILY     midodrine 10 MG tablet  Commonly known as:  PROAMATINE  Take 1 tablet by mouth 3 times daily (with meals)     NovoLOG FlexPen 100 UNIT/ML injection pen  Generic drug:  insulin aspart  Inject 10 Units into the skin 3 times daily (before meals)     pantoprazole 40 MG tablet  Commonly known as:  PROTONIX  Take 1 tablet by mouth 2 times daily     spironolactone 50 MG tablet  Commonly known as:  ALDACTONE  Take 1 tablet by mouth daily     Suboxone 8-2 MG Film SL film  Generic drug:  buprenorphine-naloxone     tiotropium 2.5 MCG/ACT Aers inhaler  Commonly known as:  Spiriva Respimat  INHALE 2 PUFFS INTO THE LUNGS DAILY     traZODone 100 MG tablet  Commonly known as:  SHARON Rabago FlexTouch 100 UNIT/ML Sopn  Generic drug:  Insulin Degludec  Inject 30 Units into the skin nightly        STOP taking these medications    metOLazone 2.5 MG tablet  Commonly known as:  ZAROXOLYN  Notes to patient:  [address] 12/5     Victoza 18 MG/3ML Sopn SC injection  Generic drug:  Liraglutide           Where to Get Your Medications      Information about where to get these medications is not yet available    Ask your nurse or doctor about these medications  · potassium chloride 20 MEQ extended release tablet  · torsemide 100 MG tablet           Discharged in stable condition to home. Follow Up: Follow up with PCP.

## 2020-12-03 NOTE — PROGRESS NOTES
Report given to [facility]. Pt. Stable. Care transferred at this time.

## 2020-12-03 NOTE — CARE COORDINATION
CM reviewed chart. CM still is not following pt. Pt remains on RA at 98% of her SpO2. GRACIE spoke with Dr. Lisa Rodriguez, and he states CM does not need to follow pt. GRACIE contacted PETERSON, Raleigh Barnhart, who is following pt's case, and informed her \"[12/3/2020 9:17 AM]    [facility]. I am taking care of [facility]. After reviewing her chart: she has been here 68 hours, continues to have a K+ of 3.3. , Na++ 127. Hypotensive with at least 3 readings of 79/54, 88/59, 89/50. Dr. Deanna Perez wants to make her inpt. \". CM is not following pt. If CM is needed, please contact CM.
agreement/Understanding:   Not Indicated    Community Service Affiliation  Dialysis:  No    · Agency:  · Location:  · Dialysis Schedule:  · Phone:   · Fax: Other Community Services: n/a    DISCHARGE PLAN: Explained Case Management role/services. Spoke with pt via call to bedside phone. Pt reported being independent prior to admission and plans to return. Pt stated she would have a ride home on discharge. Pt declined any current home care services and has used [facility] skilled care in the past. Pt reported if she would need HHC, she would like to use them again. At this time, pt does not anticipate any needs for [place] [id number] or CM on discharge. No current needs from CM identified at this time. CM not following at this time. Please notify CM if needs or concerns arise.

## 2020-12-03 NOTE — FLOWSHEET NOTE
12/02/20 2022   Vitals   Temp 97.1 °F (36.2 °C)   Temp Source Oral   Pulse 96   Heart Rate Source Monitor   Resp 18   /70   BP Location Left upper arm   BP Upper/Lower Upper   BP Method Automatic   Patient Position Semi fowlers   Level of Consciousness Alert (0)   MEWS Score 1   Oxygen Therapy   SpO2 97 %   O2 Device None (Room air)   Shift assessment completed-see flow sheet. Patient in bed awake,alert and oriented x4. Evening medications given per order. Patient denies any further needs, will continue to monitor,call light within reach.

## 2020-12-03 NOTE — PROGRESS NOTES
IM Progress Note    Admit Date:  11/30/2020  0    Interval history:  Sent in by cardiology office for dyspnea  Workup showed no pna or fluid overload but electrolyte abn   Of severe hyponatremia, hypokalemia       12/1: seen sitting up in bed, slightly wheezy this am.     Had severe hypotension , requiring multiple fluid boluses    Improved BP and good UOP,      Subjective:  Ms. Christopher Jules reports sob resolved, stable on RA   resolved nausea, tolerating diet  Wishes to go home today         Objective:   /69   Pulse 95   Temp 96.9 °F (36.1 °C) (Oral)   Resp 18   Ht 5' 4.5\" (1.638 m)   Wt 218 lb 3.2 oz (99 kg)   LMP 10/24/2012   SpO2 92%   BMI 36.88 kg/m²       Intake/Output Summary (Last 24 hours) at 12/3/2020 0732  Last data filed at 12/3/2020 0235  Gross per 24 hour   Intake 1936 ml   Output 2000 ml   Net -64 ml       Physical Exam:        General: middle aged female obese, chronically ill appearing  Happy mood today    Awake, alert and oriented.  Appears to be not in any distress  Mucous Membranes:  Pink , anicteric  Neck: No JVD, no carotid bruit, no thyromegaly  Chest:  Clear dinora air entry no wheeze  Cardiovascular:  RRR S1S2 heard, no murmurs or gallops  Abdomen:  Soft, undistended, non tender, no organomegaly, BS present  Extremities: chronic 1 + brawny edema to both LE  Healing crusted venous ulcers to both legs  Distal pulses well felt  Neurological : grossly normal        Medications:   Scheduled Medications:    albuterol sulfate HFA  2 puff Inhalation BID    ipratropium  2 puff Inhalation BID    ARIPiprazole  10 mg Oral Daily    lamoTRIgine  100 mg Oral BID    aspirin EC  81 mg Oral Daily    atorvastatin  80 mg Oral Nightly    benztropine  1 mg Oral Nightly    buprenorphine-naloxone  1 Film Sublingual BID    clopidogrel  75 mg Oral Daily    midodrine  10 mg Oral TID WC    pantoprazole  40 mg Oral BID AC    sodium chloride flush  10 mL Intravenous 2 times per day    enoxaparin  40 mg         XR CHEST PORTABLE   Final Result   No active cardiopulmonary disease               V/q scan - low probability of PE    Stress test 8/20         Calculated LVEF is not reliable on study which is technically difficult,     estimate of 51% is likely an overestimate     Mid-distal anteroseptal/apical akinesis     Large fixed defects in anteroseptal/apical walls     Mild to moderate reversibility in lateral walls consistent with ischemia          Overall, this would be considered an abnormal high risk study      Echo 8/2020    Summary   Limited only f/u for LVEF and mitral regurgitation. Technically difficult examination. The left ventricular systolic function is severely reduced with an ejection   fraction of 25%. There is hypokinesis of the apex, apical lateral, apical septum,   anterioseptum and anterior walls. Compared to previous study from 7-1-2020 no changes noted in left   ventricular function. Moderate mitral regurgitation. Assessment & Plan:-      Hypokalemia, secondary to  Over diuresis  -2.3-->2.9-->2.7  -Was on aldactone 50, [id number] mg  and metolazone 2.5 mg daily  at home. -Cardiology recently increased diuresis regimen. -PRN K+ replacement, as well as 40 mEq PO BID  - recheck K+ improving now   - nephrology involved     ARF with CKD 3    -Cr worsened with severe hypotension- likely pre renal and ATN  - multiple fluid boluses given and improved BP now  - roche placed, nephro consulted. - improving UOP and improved BP with aggressive fluid boluses  - held diuretics and entresto   - creatinine back down to 1.4  - resume home diuretics as mentioned below       Hypovolemic hyponatremia  - secondary to over diuresis   - 127-->126-->124 >132 after IVF  - nephrology consulted      Dyspnea , likely COPD   - no PNA per imaging.  No fluid overload   - likely secondary to smoking.  - v.q scan with low probability  - dsypnea resolved with no intervention     Ischemic HD with chronic systolic HF  CAD s.p CABG ( 1/20 )    - was on entresto( half dose )  and diuretics at home   - was severely hypotensive and hypovolemic on adm- see below  - resume plavix, not on BB for unclear reasons  - can retry as outpt with low dose coreg or toprol once acute issues resolve      Diabetes type 2 uncontrolled  - FSBS 400s   - high dose SSI + 10 units TID,  - on 30 units Tresiba at home    - improved sugars     Depression - resume home meds - abilify, buspar , lamictal    Recent left perinephric hematoma - improving per repeat imaging    Dc home today          Recommend smoking cessation     Cut down demadex to 50 mg and continue aldactone 50 mg from tomorrow    Resume metolozone every other day from Saturday   Resume entresto half tab from Sunday     Cut down potassium to half     BMP in 1 week    Stop victoza for a week due to nausea    F/w cardiology in 2 weeks      Daniel Noel MD 12/3/2020 7:32 AM

## 2020-12-03 NOTE — PROGRESS NOTES
Patient resting quietly in bed. No s/s of distress noted. No c/o nausea or vomiting this morning. Shift assessment complete, see flow sheet. Denies needs at this time. Call light in reach. Will monitor.

## 2020-12-03 NOTE — FLOWSHEET NOTE
12/03/20 0114   Vitals   Temp 96.9 °F (36.1 °C)   Temp Source Oral   Pulse 95   Heart Rate Source Monitor   Resp 18   /69   BP Location Left upper arm   BP Upper/Lower Upper   BP Method Automatic   Patient Position Semi fowlers   Level of Consciousness Alert (0)   MEWS Score 1   Oxygen Therapy   SpO2 98 %   O2 Device None (Room air)   Patient with c/o headache. PRN tylenol given per order. Vitals stable. Patient denies any further needs,will continue to monitor,call light within reach.

## 2020-12-03 NOTE — PROGRESS NOTES
[facility]    Follow up Note           Reason for Consult: SHAUNNA  Requesting Physician:  Dr. Wallace Book for    Sub/interval history  Patient feels better and wants to go home  Creatinine down to 1.2    Last 24 h uop 2 L  wt 218 (admission wt 207)    ROS: No chest pain/shortness of breath/fever  PSFH: No visitor    Scheduled Meds:   albuterol sulfate HFA  2 puff Inhalation BID    ipratropium  2 puff Inhalation BID    torsemide  20 mg Oral Daily    spironolactone  25 mg Oral Daily    ARIPiprazole  10 mg Oral Daily    lamoTRIgine  100 mg Oral BID    aspirin EC  81 mg Oral Daily    atorvastatin  80 mg Oral Nightly    benztropine  1 mg Oral Nightly    buprenorphine-naloxone  1 Film Sublingual BID    clopidogrel  75 mg Oral Daily    midodrine  10 mg Oral TID WC    pantoprazole  40 mg Oral BID AC    sodium chloride flush  10 mL Intravenous 2 times per day    enoxaparin  40 mg Subcutaneous Daily    insulin lispro  0-18 Units Subcutaneous TID WC    insulin lispro  0-9 Units Subcutaneous Nightly    influenza virus vaccine  0.5 mL Intramuscular Prior to discharge    insulin glargine  30 Units Subcutaneous Nightly    busPIRone  15 mg Oral BID    insulin lispro  10 Units Subcutaneous TID WC     Continuous Infusions:   dextrose       PRN Meds:.ipratropium-albuterol, prochlorperazine, traZODone, sodium chloride flush, acetaminophen **OR** acetaminophen, polyethylene glycol, glucose, dextrose, glucagon (rDNA), dextrose, albuterol    History of Present Illness on 12/1/2020:    60 y.o. yo female with PMH of CHF with reduced EF who is admitted for hyponatremia, hyperkalemia, SHAUNNA  Patient has not been feeling well for last few days  She was seen at cardiology office and was asked to come to the emergency room because of numbness of breath and wheezing. She was noted to have to be hypokalemic and hyponatremic along with SHAUNNA  She has been admitted and nephrology has been consulted for this reasons. She received Entresto this morning around 1:20 AM.  Her blood pressure has dropped down to 60s- 70s, in the PCU for which the patient has received 1.5 L of normal saline bolus. Physical exam:   Constitutional:  VITALS:  /66   Pulse 109   Temp 97.7 °F (36.5 °C) (Oral)   Resp 18   Ht 5' 4.5\" (1.638 m)   Wt 218 lb 3.2 oz (99 kg)   LMP 10/24/2012   SpO2 94%   BMI 36.88 kg/m²   Gen: alert, awake, drowsy  Skin: no rash, turgor wnl  Heent:  eomi, mmm  Neck: no bruits or jvd noted, thyroid normal  Cardiovascular:  S1, S2 without m/r/g; trace lower extremity edema  Respiratory: CTA B without w/r/r; respiratory effort normal  Abdomen:  +bs, soft, nt, nd, no hepatosplenomegaly  Neuro/Psy: AAoriented times 3 ; moves all 4 ext  Musculoskeletal:  Rom, muscular strength intact; digits, nails normal    Data/  Recent Labs     12/01/20  0504 12/02/20  0730 12/03/20  0754   WBC 14.0* 14.5* 11.4*   HGB 15.5 14.5 14.0   HCT 47.2 44.5 42.8   MCV 88.1 87.9 87.8    197 189     Recent Labs     11/30/20  1137 11/30/20  2121 12/01/20  0504  12/02/20  0730 12/02/20 2029 12/03/20  0754   * 126* 124*   < > 132* 129* 127*   K 2.3*  2.3* 2.9* 2.7*   < > 3.2* 3.3* 3.3*   CL 76* 77* 76*   < > 87* 89* 88*   CO2 38* 36* 35*   < > 35* 32 29   GLUCOSE 335* 452* 453*   < > 252* 131* 175*   PHOS  --  3.1  --   --   --   --   --    MG 2.20  --  2.20  --   --   --   --    BUN 63* 61* 63*   < > 55* 48* 39*   CREATININE 1.9* 1.5* 1.6*   < > 1.8* 1.4* 1.2*   LABGLOM 27* 36* 33*   < > 29* 39* 46*   GFRAA 33* 43* 40*   < > 35* 47* 56*    < > = values in this interval not displayed.      UA w micro: trace protein, epith 11-20 cells    Assessment  -SHAUNNA on CKD III in the setting of over diuresis, decreased PO worsened by hypotension  -Hyponatremia from hypovolemia and thiazide diuretics, hyperglycemia  -hypokalemia from metolazone and decreased po  -Hypotension from hypovolemia  -CKD III baseline cr of high 1s  -CHFpEF now with hypovolemia  -DM w uncontrolled hyperglycemia     Plan  -d/w Cards NP Claire about Cardio Mems goals and diuretic use, 218 -220 pounds could be her new baseline weight  -Resume diuretics torsemide 50 mg daily and spironolactone 25 mg daily  -Potassium chloride 60 M EQ p.o. on 12/3    -Serial renal panel  -daily wts and strict i/o  -renal dose medications   -avoid nephrotoxins      Thank you for the consultation. Please do not hesitate to call with questions.     Sergio Smith  [facility]  Office: [phone]  Fax:    [phone]

## 2020-12-04 NOTE — TELEPHONE ENCOUNTER
PATEL: Spoke with lab, glucose is 49. I called and spoke with pt. She is feeling fine. She will eat and recheck her blood sugar. She was in a hurry this morning and did not check it/treat it appropriately.

## 2020-12-07 NOTE — TELEPHONE ENCOUNTER
Created telephone encounter. Spoke with Kofi Mcgee relayed message per [employer] regarding labs. Pt verbalized understanding.

## 2020-12-07 NOTE — TELEPHONE ENCOUNTER
----- Message from TRAY Cisneros CNP sent at 12/4/2020  4:44 PM EST -----  Renal function stable and K stable. Reviewed CardioMEMs pressures - PAD 22-24 which is stable for her. Continue current treatment plan.     TRAY Cisneros CNP

## 2020-12-20 NOTE — Clinical Note
Patient Class: Inpatient [101]   REQUIRED: Diagnosis: DKA, type 2, not at goal [facility] [[id number]]   Estimated Length of Stay: Estimated stay of more than 2 midnights   Admitting Provider: Sheryle Kos [[id number]]   Preferred Department: ICU

## 2020-12-21 PROBLEM — E11.10 DKA, TYPE 2, NOT AT GOAL (HCC): Status: ACTIVE | Noted: 2020-01-01

## 2020-12-21 NOTE — ED PROVIDER NOTES
[facility]      Pt Name: Alejandra Paez  MRN: [id number]  [place] 1965  Date of evaluation: 12/20/2020  Yue Wadsworth MD    [address] [place] [address]       Chief Complaint   Patient presents with    Emesis     vomiting and weakness          HISTORY OF PRESENT ILLNESS   (Location/Symptom, Timing/Onset,Context/Setting, Quality, Duration, Modifying Factors, Severity)  Note limiting factors. Alejandra Paez is a 60 y.o. female who presents to the emergency department for emesis x 2 days. Also reports chest pain substernal described as pressure/burning occurs only when she is coughing. Reports shortness of breath only when she coughs. Denies fever, diarrhea. Upper abdominal tenderness though states it is from her constant vomiting.   -Had a follow up visit on Friday as a follow up visit from hospital admission. Had labwork done and was told to go to the ER ED at that time however states that she did not want to do so. -Reports cough, body aches, unsteady on her feet for 15 weeks, has been on multiple antibiotics without much improvement. HPI    Nursing Notes were reviewed. REVIEW OF SYSTEMS    (2-9 systems for level 4, 10 or more for level 5)     Review of Systems   Constitutional: Positive for fatigue. Negative for activity change, appetite change, diaphoresis and fever. Respiratory: Positive for cough and shortness of breath. Negative for chest tightness, wheezing and stridor. Cardiovascular: Negative for chest pain and palpitations. Gastrointestinal: Positive for abdominal pain, nausea and vomiting. Negative for diarrhea. Genitourinary: Negative for dysuria and flank pain. Skin: Negative for rash and wound. Neurological: Positive for weakness (generalized). Negative for dizziness, light-headedness, numbness and headaches. Except as noted above the remainder of the review of systems was reviewedand negative.        PAST MEDICAL HISTORY     Past Medical History:   Diagnosis Date    Acute blood loss anemia 8/5/2020    Acute kidney injury ([address] 12/25/2019    Acute kidney injury superimposed on CKD ([address] 8/5/2020    Acute on chronic combined systolic and diastolic congestive heart failure ([address] 1/10/2020    Acute on chronic right-sided heart failure ([address] 08/2020    Alcohol dependence ([address] 3/10/2010    Cellulitis 6/3/2020    Diabetic ulcer of left foot associated with type 2 diabetes mellitus, limited to breakdown of skin ([address] 1/18/2020    Diabetic ulcer of toe of right foot associated with type 2 diabetes mellitus ([address] 1/18/2020    Left renal artery stenosis ([address] 08/2020    MI (myocardial infarction) ([address] 10/07/2019    NSTEMI    Positive FIT (fecal immunochemical test) 2/28/2020    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 Fany Sunshine - at [facility]  01/20/2020    Dr. Hart Half      pancreatitis post surgery    CORONARY ANGIOPLASTY WITH STENT PLACEMENT  03/16/2018    MELODY- 3.5 x 38 and 3.5 x 20 to Cx    CORONARY ANGIOPLASTY WITH STENT PLACEMENT  01/03/2018    MELODY- 3.0 x 38 and 2.75 x 26 and 2.75 x 8 and 2.75 x 22 to the LAD    CORONARY ANGIOPLASTY WITH STENT PLACEMENT  10/07/2019    MELODY- 2.5 x 8 to [address] GRAFT N/A 1/27/2020    CORONARY ARTERY BYPASS GRAFTING X 1, ON PUMP BEATING HEART, INTERNAL MAMMARY ARTERY TO LEFT ANTERIOR DESCENDING ARTERY, VAS CATH INSERTION, URI, PLATELET GEL APPLICATION, 5 LEVEL BILATERAL INTERCOSTAL NERVE BLOCK, STERNAL PLATING performed by Tyson Palacios MD at [place] [address] FEMORAL BYPASS Right 5/16/2019    fem-pop, performed by Yosi Huerta MD at [address]  02/14/2019    CardioMEMs insertion for CHF    ROTATOR CUFF REPAIR Left 04/22/2016    TONSILLECTOMY      TRANSESOPHAGEAL ECHOCARDIOGRAM  01/26/2020    TRANSLUMINAL ANGIOPLASTY Left 10/08/2019    with stenting, at [facility]   Willa Romero Left 04/29/2020    of the [facility] re-occlusion    TUMOR EXCISION      benign tumors X 2 chest & axilla    UPPER GASTROINTESTINAL ENDOSCOPY  4/25/2013    BX barretts, HH, gastritis    UPPER GASTROINTESTINAL ENDOSCOPY  12/10/2015    UPPER GASTROINTESTINAL ENDOSCOPY  01/06/2017    Gastritis    VASCULAR SURGERY Left 12/08/2015    upper extremity and mesenteric angiogram (diagnostic only)    VASCULAR SURGERY Bilateral 07/07/2020    diagnostic lower extremity angiogram only    VASCULAR SURGERY Left 08/04/2020    angioplasty and stent of renal artery    VASCULAR SURGERY Left 08/05/2020    coil embolization of branch renal artery vessel for bleeding    VASCULAR SURGERY Left 09/01/2020    angioplasty and stenting of subclavian artery         CURRENT MEDICATIONS       Previous Medications    ALBUTEROL SULFATE  (90 BASE) MCG/ACT INHALER    Inhale 2 puffs into the lungs every 6 hours as needed for Wheezing or Shortness of Breath    ARIPIPRAZOLE (ABILIFY) 10 MG TABLET    Take 10 mg by mouth daily     ASPIRIN EC 81 MG EC TABLET    Take 1 tablet by mouth daily    ATORVASTATIN (LIPITOR) 80 MG TABLET    Take 1 tablet by mouth nightly    BENZTROPINE (COGENTIN) 1 MG TABLET    Take 1 mg by mouth nightly     BLOOD GLUCOSE MONITORING SUPPL (FREESTYLE FREEDOM LITE) W/DEVICE KIT    Use to test glucose 4 times a day    BLOOD GLUCOSE TEST STRIPS (FREESTYLE LITE) STRIP    USE TO CHECK BLOOD GLUCOSE LEVELS FOUR TIMES DAILY    BUPRENORPHINE-NALOXONE (SUBOXONE) 8-2 MG FILM SL FILM    Place 1 Film under the tongue 2 times daily.     BUSPIRONE (BUSPAR) 30 MG TABLET    Take 30 mg by mouth 2 times daily     CLOPIDOGREL (PLAVIX) 75 MG TABLET    TAKE 1 TABLET BY MOUTH EVERY DAY    DOXEPIN HCL PO    Take 1 capsule by mouth nightly as needed Patient unsure of exact dosage    INSULIN ASPART (NOVOLOG FLEXPEN) 100 UNIT/ML INJECTION PEN    Inject 10 Units into the skin 3 times daily (before meals)    INSULIN DEGLUDEC (TRESIBA FLEXTOUCH) 100 UNIT/ML SOPN    Inject 30 Units into the skin nightly    INSULIN PEN NEEDLE (B-D ULTRAFINE III SHORT PEN) 31G X 8 MM MISC    Five shots a day    INSULIN SYRINGE .5CC/29G 29G X 1/2\" 0.5 ML MISC    1 each by Does not apply route daily    LAMOTRIGINE (LAMICTAL) 150 MG TABLET    Take 200 mg by mouth 2 times daily     MAGNESIUM OXIDE (MAG-OX) 400 (240 MG) MG TABLET    TAKE 1 TABLET ONCE DAILY    MIDODRINE (PROAMATINE) 10 MG TABLET    Take 1 tablet by mouth 3 times daily (with meals)    PANTOPRAZOLE (PROTONIX) 40 MG TABLET    Take 1 tablet by mouth 2 times daily    POTASSIUM CHLORIDE (KLOR-CON M) 20 MEQ EXTENDED RELEASE TABLET    Take 1 tablet by mouth 3 times daily    SACUBITRIL-VALSARTAN (ENTRESTO) 49-51 MG PER TABLET    Take by mouth Half tablet    SPIRONOLACTONE (ALDACTONE) 50 MG TABLET    Take 1 tablet by mouth daily    TIOTROPIUM (SPIRIVA RESPIMAT) 2.5 MCG/ACT AERS INHALER    INHALE 2 PUFFS INTO THE LUNGS DAILY    TORSEMIDE (DEMADEX) 100 MG TABLET    Take 0.5 tablets by mouth daily    TRAZODONE (DESYREL) 100 MG TABLET    Take 100 mg by mouth nightly as needed for Sleep Can take 1-2 tabs nightly       ALLERGIES     Lisinopril    FAMILY HISTORY       Family History   Problem Relation Age of Onset    Heart Disease Maternal Grandmother     Cancer Mother         lung and brain cancer    Cancer Maternal Aunt         lung and brain cancer          SOCIAL HISTORY       Social History     Socioeconomic History    Marital status: Single     Spouse name: None    Number of children: None    Years of education: None    Highest education level: None   Occupational History    None   Social Needs    Financial resource strain: None    Food insecurity     Worry: None     Inability: None    Transportation needs     Medical: None     Non-medical: None   Tobacco Use    Smoking status: Current Every Day Smoker     Packs/day: 0.50     Years: 30.00     Pack years: 15.00     Types: Cigarettes    Smokeless tobacco: Never Used   Substance and Sexual Activity    Alcohol use: No     Comment: quit 2006     Drug use: Never    Sexual activity: Yes     Partners: Male   Lifestyle    Physical activity     Days per week: None     Minutes per session: None    Stress: None   Relationships    Social connections     Talks on phone: None     Gets together: None     Attends Yazidism service: None     Active member of club or organization: None     Attends meetings of clubs or organizations: None     Relationship status: None    Intimate partner violence     Fear of current or ex partner: None     Emotionally abused: None     Physically abused: None     Forced sexual activity: None   Other Topics Concern    None   Social History Narrative    None       SCREENINGS             PHYSICAL EXAM    (up to 7 for level 4, 8 ormore for level 5)     ED Triage Vitals [12/20/20 2224]   BP Temp Temp Source Pulse Resp SpO2 Height Weight   135/70 97.6 °F (36.4 °C) Oral 106 16 (!) 88 % 5' 4\" (1.626 m) 212 lb (96.2 kg)       Physical Exam  Constitutional:       General: She is not in acute distress. Appearance: Normal appearance. She is well-developed. She is not ill-appearing or toxic-appearing. Comments: Sitting in bed comfortably, speaking in full sentences, following verbal commands appropriately. Not in acute distress     HENT:      Head: Normocephalic and atraumatic. Eyes:      Conjunctiva/sclera: Conjunctivae normal.      Pupils: Pupils are equal, round, and reactive to light. Neck:      Musculoskeletal: Normal range of motion and neck supple.    Cardiovascular:      Rate and Rhythm: at:  [facility]. [facility]  [address] [place] [address]. [facility], [address]   Phone [phone]   MAGNESIUM - Abnormal; Notable for the following components:    Magnesium 2.50 (*)     All other components within normal limits    Narrative:     Santa Rees  [facility] tel. [phone],  Chemistry results called to and read back by Gianfranco Call dr, 12/20/2020  23:57, by [facility]  Chemistry results called to and read back by Alondra Pham rn, 12/20/2020  23:39, by [facility]  Chemistry results called to and read back by Gianfranco Call dr, 12/20/2020  23:34, by [facility]  Performed at:  [facility]. [facility]  [address] [place] [address]. [facility], [address]   Phone [phone]   GLUCOSE - Abnormal; Notable for the following components:    Glucose 513 (*)     All other components within normal limits    Narrative:     Performed at:  [facility]. [facility]  [address] [place] [address]. [facility], [address]   Phone [phone]   POTASSIUM    Narrative:     Performed at:  [facility]. [facility]  [address] [place] [address]. [facility], [address]   Phone [phone] METABOLIC PANEL   BASIC METABOLIC PANEL   MAGNESIUM   MAGNESIUM   PHOSPHORUS   PHOSPHORUS   BASIC METABOLIC PANEL   MAGNESIUM   PHOSPHORUS   POCT GLUCOSE   POCT GLUCOSE   POCT GLUCOSE   POCT GLUCOSE   POCT GLUCOSE   POCT GLUCOSE   POCT GLUCOSE   POCT GLUCOSE   POCT GLUCOSE   POCT GLUCOSE   POCT GLUCOSE   POCT GLUCOSE       All other labs were within normal range ornot returned as of this dictation.     EMERGENCY DEPARTMENT COURSE and DIFFERENTIAL DIAGNOSIS/MDM:   Vitals:    Vitals:    12/21/20 0145 12/21/20 0240 12/21/20 0330 12/21/20 0446   BP: (!) 114/91 105/76 120/78 101/75   Pulse: 99 100 99 97   Resp: 16 16 14 16   Temp:       TempSrc:       SpO2: 99% 100% 99% 100%   Weight:       Height:             MDM    ED COURSE/MDM    -Alejandra Paez is a 60 y.o. female with a history of CAD, diabetes, hypertension, hyperlipidemia who presents to ED for emesis, cough and shortness of breath. -Upon arrival patient was afebrile, mildly tachycardic at 106 bpm, BP of 135/70 respiratory rate of 16 satting 88% on room air. She improved to 100% on 2 L nasal cannula  -Of note patient was admitted 11/30/2020 and discharged 12/3/2020 she was admitted from the cardiology office to be evaluated in the ED and was found to be hypokalemic.  -Work was significant for hyponatremia of 116 corrected to 128, elevated BUN of 88, creatinine of 2.8 with glucose of 843 and anion gap of 19. BNP of 924 which is improved from 4000 on 12/4/2020. Troponin of 0.04 elevated from 0.03 on 11/30/2020 though suspect that this is due to SHAUNNA. -Chest x-ray shows no acute disease  -Patient was started on 2 L IV fluid bolus. Given hypokalemia of 2.9, was not able to start on insulin. Instead patient was started on 20 mEq of KCl IV and 60 mEq of KCl p.o. Recheck potassium before starting insulin.  -Labs and imaging reviewed and results discussed with patient. discussed admission for DKA as well as for aspiratory failure. The patient was initially resistant on admission, she was amenable after further discussion.  -Discussed admission with Dr. Kaylie Gutiérrez at [facility] who agreed to accept patient. However Sae Addison later informed that [facility] no longer had ICU beds and therefore requested admission to [facility]. Spoke with Dr. Thelma Adrian during admission. He agrees to accept patient for DKA, request second IV line, and Covid PCR swab and further potassium supplementation and to start insulin appropriate.  -Potassium was 4.8. At that point, patient has had 2 L NS, repeat glucose was 513. Patient was started on insulin infusion.        REASSESSMENT      Well appearing, non toxic, alert, oriented speaking in full sentences and hemodynamically stable upon discharge      CRITICAL CARE TIME   Total Critical Care time was 40 minutes, excluding separately reportableprocedures. There was a high probability of clinicallysignificant/life threatening deterioration in the patient's condition which required my urgent intervention. CONSULTS:  IP CONSULT TO HOSPITALIST    PROCEDURES:  Unless otherwise noted below, none     Procedures    FINAL IMPRESSION      1. Diabetic ketoacidosis without coma associated with type 2 diabetes mellitus ([address]    2. Acute respiratory failure with hypoxia (HCC)    3. SHAUNNA (acute kidney injury) ([address]    4. Hypokalemia    5. Hyponatremia          DISPOSITION/PLAN   DISPOSITION        PATIENT REFERREDTO:  No follow-up provider specified.     DISCHARGE MEDICATIONS:  New Prescriptions    No medications on file          (Please note that portions of this note were completed with a voice recognition program.  Efforts were made to edit the dictations but occasionally wordsare mis-transcribed.)    Ashleigh Mccollum MD (electronically signed)  Attending Emergency Physician              Ashleigh Mccollum MD  12/21/20 9182

## 2020-12-21 NOTE — ED NOTES
PTC call with bed assignment to [facility] 240-1. Number for report is [phone].      Simmie Goltz, RN  12/21/20 7754

## 2020-12-21 NOTE — CONSULTS
Pharmacy Consult: [facility]    Home medication list updated based on available dispense records in [address] [place].  Gudelia Albright, BCPS   12/21/2020 3:53 PM

## 2020-12-21 NOTE — H&P
HOSPITAL MEDICINE     History & Physical        Patient:  Damian Negro  YOB: 1965    MRN: [id number]     Acct: [de-identified]    PCP: Navid Beth PA-C    Date of Admission: 12/21/2020    Date of Service:   Pt seen/examined on 12/21/2020     Admitted to Inpatient with expected LOS greater than two midnights due to medical therapy. History obtained from reviewing the medical record and patient/family Interview. Assessment:     Damian Negro is a 60 y.o. female who presented/brought to  on 12/21/2020  For      1. Diabetic ketoacidosis, anion gap closed now  2. Shortness of breath, acute systolic versus [id number]  3. Mild hypoxia, resolved  4. Acute kidney injury on CKD, improving  5. Hypokalemia, repleted  6. Severe hyponatremia, initial sodium level was 126 corrected to hyperglycemia   7. None dynamic troponin elevation not consistent with myocardial infarction  8. Service, elevated procalcitonin (not reliable in the setting of SHAUNNA) not candidate for further IV fluid in the light of the severe CHF. No indication for antibiotics, follow COVID-19 test.     comorbidities:    · Chronic systolic CHF secondary ischemic cardiomyopathy, on Entresto  · Diabetes mellitus, insulin-dependent,  · Psychiatric disorder, on Abilify, benztropine, BuSpar, Lamictal, trazodone  · Narcotic abuse ? Suboxone  · Chronic hypotension, on midodrine     Plan:  1. Anion gap closed, will continue basal bolus insulin therapy, monitor blood sugar avoid excessive IV fluid administration in the light of the advanced CHF  2. Awaiting COVID-19 test, currently on room air, oxygen as required no indication for steroid now. 3. Consult nephrology, closely monitor sodium levels avoid overcorrection. Monitor creatinine, restart diuretics when okay per nephrology  4. Continue selected home medication  5. Pharmacy to verify Suboxone  6.  May need adjustment of her diuretics, Entresto prior to discharge 7. Importance of adherence to medication discussed with the patient. PT and OT                  Code Status: Full Code          DVT prophylaxis: [x] Lovenox                                 [] SCDs                                 [] SQ Heparin                                 [] Encourage ambulation           [] Already on Anticoagulation     Disposition:    [x] Home       [] TCU       [] Rehab       [] Psych       [] SNF       [] [place]       [] Other-    -------------------------------------------------------------  Chief Complaint:         History Of Present Illness:       60 y.o. female who presented to outside ED with nausea, vomiting for about 2 days, she reports shortness of breath, intermittent cough, no sputum production, no fever or diarrhea. Patient endorse generalized weakness. Upon arrival to the emergency room it was noted that the patient has severe hyperglycemia with blood sugar in the 800, anion gap is elevated, sodium was 116 (corrected hyperglycemia to 126) patient diagnosed with DKA, started on IV insulin, fluid. Potassium was also replaced. Covid 19 PCR was sent    Patient was subsequently transferred to our facility for ICU care, for DKA, during her stay the her gap was closed, insulin drip discontinued. During my evaluation, I was told about the patient around 15 PM patient was comfortable, no acute distress, requesting food.                  Past Medical History:          Diagnosis Date    Acute blood loss anemia 8/5/2020    Acute kidney injury ([address] 12/25/2019    Acute kidney injury superimposed on CKD ([address] 8/5/2020    Acute on chronic combined systolic and diastolic congestive heart failure ([address] 1/10/2020    Acute on chronic right-sided heart failure ([address] 08/2020    Alcohol dependence ([address] 3/10/2010    Cellulitis 6/3/2020    Diabetic ulcer of left foot associated with type 2 diabetes mellitus, limited to breakdown of skin ([address] 1/18/2020  Diabetic ulcer of toe of right foot associated with type 2 diabetes mellitus ([facility] 1/18/2020    Left renal artery stenosis ([facility] 08/2020    MI (myocardial infarction) ([facility] 10/07/2019    NSTEMI    Positive FIT (fecal immunochemical test) 2/28/2020    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 steal syndrome 09/01/2020    Traumatic perinephric hematoma, left, initial encounter 8/4/2020       Past Surgical History:          Procedure Laterality Date    ARTERIAL BYPASS SURGRY Left 01/06/2016    Axillary/Subclavian to Brachial Bypass w/reversed SVG    CARDIAC CATHETERIZATION  03/27/2018    Dr. Nancy Mooney - at [address] [place]  01/20/2020    Dr. Wero Moe      pancreatitis post surgery    CORONARY ANGIOPLASTY WITH STENT PLACEMENT  03/16/2018    MELODY- 3.5 x 38 and 3.5 x 20 to Cx    CORONARY ANGIOPLASTY WITH STENT PLACEMENT  01/03/2018    MELODY- 3.0 x 38 and 2.75 x 26 and 2.75 x 8 and 2.75 x 22 to the LAD    CORONARY ANGIOPLASTY WITH STENT PLACEMENT  10/07/2019    MELODY- 2.5 x 8 to [address] GRAFT N/A 1/27/2020    CORONARY ARTERY BYPASS GRAFTING X 1, ON PUMP BEATING HEART, INTERNAL MAMMARY ARTERY TO LEFT ANTERIOR DESCENDING ARTERY, VAS CATH INSERTION, URI, PLATELET GEL APPLICATION, 5 LEVEL BILATERAL INTERCOSTAL NERVE BLOCK, STERNAL PLATING performed by Marky Norwood MD at [address] [place] [address] Right 5/16/2019    fem-pop, performed by Ledy William MD at [address]  02/14/2019    CardioMEMs insertion for CHF    ROTATOR CUFF REPAIR Left 04/22/2016    TONSILLECTOMY      TRANSESOPHAGEAL ECHOCARDIOGRAM  01/26/2020    TRANSLUMINAL ANGIOPLASTY Left 10/08/2019    with stenting, at [facility]    [address] Left 04/29/2020    of the [facility] re-occlusion    TUMOR EXCISION      benign tumors X 2 chest & axilla    UPPER GASTROINTESTINAL ENDOSCOPY  4/25/2013    BX barrettBAYLEE vang, gastritis  UPPER GASTROINTESTINAL ENDOSCOPY  12/10/2015    UPPER GASTROINTESTINAL ENDOSCOPY  01/06/2017    Gastritis    VASCULAR SURGERY Left 12/08/2015    upper extremity and mesenteric angiogram (diagnostic only)    VASCULAR SURGERY Bilateral 07/07/2020    diagnostic lower extremity angiogram only    VASCULAR SURGERY Left 08/04/2020    angioplasty and stent of renal artery    VASCULAR SURGERY Left 08/05/2020    coil embolization of branch renal artery vessel for bleeding    VASCULAR SURGERY Left 09/01/2020    angioplasty and stenting of subclavian artery       Medications Prior to Admission:      Prior to Admission medications    Medication Sig Start Date End Date Taking?  Authorizing Provider   torsemide (DEMADEX) 100 MG tablet Take 0.5 tablets by mouth daily 12/3/20   Thelma Henson MD   potassium chloride (KLOR-CON M) 20 MEQ extended release tablet Take 1 tablet by mouth 3 times daily 12/3/20   Thelma Henson MD   Blood Glucose Monitoring Suppl (FREESTYLE FREEDOM LITE) w/Device KIT Use to test glucose 4 times a day 11/17/20   Jaelyn Lester MD   blood glucose test strips (FREESTYLE LITE) strip USE TO CHECK BLOOD GLUCOSE LEVELS FOUR TIMES DAILY 11/17/20   Jaelyn Lester MD   sacubitril-valsartan (ENTRESTO) 49-51 MG per tablet Take by mouth Half tablet    Historical Provider, MD   tiotropium (SPIRIVA RESPIMAT) 2.5 MCG/ACT AERS inhaler INHALE 2 PUFFS INTO THE LUNGS DAILY 10/13/20   Rick Macias MD   Insulin Degludec (TRESIBA FLEXTOUCH) 100 UNIT/ML SOPN Inject 30 Units into the skin nightly 9/22/20   TRAY Wells CNP   Insulin Pen Needle (B-D ULTRAFINE III SHORT PEN) 31G X 8 MM MISC Five shots a day 9/22/20   TRAY Wells CNP   spironolactone (ALDACTONE) 50 MG tablet Take 1 tablet by mouth daily 9/10/20   TRAY Luu CNP   midodrine (PROAMATINE) 10 MG tablet Take 1 tablet by mouth 3 times daily (with meals) 9/2/20   Susie Martinez MD insulin aspart (NOVOLOG FLEXPEN) 100 UNIT/ML injection pen Inject 10 Units into the skin 3 times daily (before meals) 8/14/20   TRAY Arora CNP   traZODone (DESYREL) 100 MG tablet Take 100 mg by mouth nightly as needed for Sleep Can take 1-2 tabs nightly    Historical Provider, MD   DOXEPIN HCL PO Take 1 capsule by mouth nightly as needed Patient unsure of exact dosage    Historical Provider, MD   albuterol sulfate  (90 Base) MCG/ACT inhaler Inhale 2 puffs into the lungs every 6 hours as needed for Wheezing or Shortness of Breath 7/3/20   Pallavi Dan MD   atorvastatin (LIPITOR) 80 MG tablet Take 1 tablet by mouth nightly 2/28/20   TRAY Fisher CNP   buprenorphine-naloxone (SUBOXONE) 8-2 MG FILM SL film Place 1 Film under the tongue 2 times daily. Historical Provider, MD   benztropine (COGENTIN) 1 MG tablet Take 1 mg by mouth nightly  12/13/19   Historical Provider, MD   INSULIN SYRINGE .5CC/29G 29G X 1/2\" 0.5 ML MISC 1 each by Does not apply route daily 12/3/19   TRAY Arora CNP   pantoprazole (PROTONIX) 40 MG tablet Take 1 tablet by mouth 2 times daily 10/1/19   Reji Cherry MD   clopidogrel (PLAVIX) 75 MG tablet TAKE 1 TABLET BY MOUTH EVERY DAY 9/3/19   TRAY Fisher CNP   magnesium oxide (MAG-OX) 400 (240 Mg) MG tablet TAKE 1 TABLET ONCE DAILY 5/1/19   Anton Mcclellan MD   busPIRone (BUSPAR) 30 MG tablet Take 30 mg by mouth 2 times daily  4/2/18   Historical Provider, MD   aspirin EC 81 MG EC tablet Take 1 tablet by mouth daily 1/8/16   Jordan Cassidy MD   ARIPiprazole (ABILIFY) 10 MG tablet Take 10 mg by mouth daily     Historical Provider, MD   lamoTRIgine (LAMICTAL) 150 MG tablet Take 200 mg by mouth 2 times daily     Historical Provider, MD       Allergies:  Lisinopril    Social History:           TOBACCO:   reports that she has been smoking cigarettes. She has a 15.00 pack-year smoking history.  She has never used smokeless tobacco. ETOH:   reports no history of alcohol use. Family History:       Reviewed in detail . Positive as follows:           Problem Relation Age of Onset    Heart Disease Maternal Grandmother     Cancer Mother         lung and brain cancer    Cancer Maternal Aunt         lung and brain cancer       Diet:  DIET CARB CONTROL;    REVIEW OF SYSTEMS:   Pertinent positives as noted in the HPI. All other systems reviewed and negative. PHYSICAL EXAM:    BP 89/65   Pulse 107   Temp 97.9 °F (36.6 °C) (Axillary)   Resp 17   Ht 5' 4\" (1.626 m)   Wt 212 lb (96.2 kg)   LMP 10/24/2012   SpO2 93%   BMI 36.39 kg/m²          Vitals:    12/21/20 1013 12/21/20 1106 12/21/20 1230   BP: 129/78 89/65    Pulse: 108 107    Resp: 18 17    Temp: 97.9 °F (36.6 °C)     TempSrc: Axillary     SpO2:  93%    Weight:   212 lb (96.2 kg)   Height:   5' 4\" (1.626 m)       Gen: Not in distress. Alert. Chronically ill  Head: Normocephalic. Atraumatic. Eyes: Conjunctivae/corneas clear. ENT: Oral mucosa moist  Neck: No JVD. No obvious thyromegaly. CVS: Nml S1S2, no murmur  , RRR  Pulmomary: Fair air entry in both lungs gastrointestinal: Soft, non tender, non distend, . Musculoskeletal: Trace edema. Warm  Neuro: No focal deficit. Moves extremity spontaneously. Psychiatry: Appropriate affect. Not agitated.       Labs:     Recent Labs     12/20/20 2230 12/21/20  1243   WBC 12.4* 13.6*   HGB 16.9* 14.7   HCT 52.1* 43.9    197     Recent Labs     12/20/20  2230 12/21/20  0350 12/21/20  0728 12/21/20  1243   *  --  132* 130*   K 2.9* 4.8 2.9* 2.9*   CL 63*  --  82* 85*   CO2 34*  --  40* 37*   BUN 88*  --  70* 58*   CREATININE 2.8*  --  2.1* 1.6*   CALCIUM 9.7  --  9.8 9.3   PHOS  --   --  3.1  --      Recent Labs     12/20/20 2230 12/21/20  1243   AST 18 18   ALT 17 13   BILITOT 1.7* 1.1*   ALKPHOS 213* 151*     Recent Labs     12/21/20  1243   INR 1.09     Recent Labs     12/20/20  2230   TROPONINI 0.04*       Urinalysis: Lab Results   Component Value Date    NITRU Negative 12/20/2020    WBCUA 0-2 11/30/2020    BACTERIA 2+ 11/30/2020    RBCUA None seen 11/30/2020    BLOODU Negative 12/20/2020    SPECGRAV <=1.005 12/20/2020    GLUCOSEU >=1000 12/20/2020       Radiology:     CXR: I have reviewed the CXR with the following interpretation: No acute findings  EKG: Sinus tachycardia, heart rate 109. Right bundle branch block, no ST segment elevation    No orders to display            Thank you Fred Alexis PA-C for the opportunity to be involved in this patient's care.     Electronically signed by Nicky Cedeno MD on 12/21/2020 at 2:48 PM

## 2020-12-21 NOTE — ED NOTES
Jessica rn had to leave. Jessica attempted call to [facility] x 3 nurse unavailable.       Niharika Herrera, RN  12/21/20 0097

## 2020-12-21 NOTE — CONSULTS
[facility] Inpatient Nephrology Consult Note    Reason for Consult:  SHAUNNA, hyponatremia   Requesting Physician:  Dr. Nellie Bond    History of Present Ilness:    60 y.o. female who  has a past medical history of Acute blood loss anemia (8/5/2020), Acute kidney injury ([address] (12/25/2019), Acute kidney injury superimposed on CKD ([address] (8/5/2020), Acute on chronic combined systolic and diastolic congestive heart failure ([address] (1/10/2020), Acute on chronic right-sided heart failure ([address] (08/2020), Alcohol dependence ([address] (3/10/2010), Cellulitis (6/3/2020), Diabetic ulcer of left foot associated with type 2 diabetes mellitus, limited to breakdown of skin ([address] (1/18/2020), Diabetic ulcer of toe of right foot associated with type 2 diabetes mellitus ([address] (1/18/2020), Left renal artery stenosis ([address] (08/2020), MI (myocardial infarction) ([address] (10/07/2019), Positive FIT (fecal immunochemical test) (2/28/2020), 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 steal syndrome (09/01/2020), and Traumatic perinephric hematoma, left, initial encounter (8/4/2020). who presented to [facility] ED initially for N/V and also SOB for 2 days. She was found to have BS of 800 and also sodium was 116(corrected for hyperglycemia was about 130), the patient was diagnosed with DKA and started on insulin drip, transferred to [facility] ICU. By the time patient made it here, anion gap was closed so was started on insulin SQ. We are consulted for SHAUNNA and hyponatremia, the patient was reporting feeling better, drowsy, did have nausea and mild abd pain but not throwing up, denies any troubles passing urine and was breathing okay on RA.            Past Medical History:   Diagnosis Date    Acute blood loss anemia 8/5/2020    Acute kidney injury ([address] 12/25/2019    Acute kidney injury superimposed on CKD ([address] 8/5/2020  Acute on chronic combined systolic and diastolic congestive heart failure ([facility] 1/10/2020    Acute on chronic right-sided heart failure ([facility] 08/2020    Alcohol dependence ([facility] 3/10/2010    Cellulitis 6/3/2020    Diabetic ulcer of left foot associated with type 2 diabetes mellitus, limited to breakdown of skin ([address] 1/18/2020    Diabetic ulcer of toe of right foot associated with type 2 diabetes mellitus ([facility] 1/18/2020    Left renal artery stenosis ([address] 08/2020    MI (myocardial infarction) ([address] 10/07/2019    NSTEMI    Positive FIT (fecal immunochemical test) 2/28/2020    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 steal syndrome 09/01/2020    Traumatic perinephric hematoma, left, initial encounter 8/4/2020             Past Surgical History:   Procedure Laterality Date    ARTERIAL BYPASS SURGRY Left 01/06/2016    Axillary/Subclavian to Brachial Bypass w/reversed SVG    CARDIAC CATHETERIZATION  03/27/2018    Dr. Refugio Miranda - at [address] [facility]  01/20/2020    Dr. Yen Mendez      pancreatitis post surgery    CORONARY ANGIOPLASTY WITH STENT PLACEMENT  03/16/2018    MELODY- 3.5 x 38 and 3.5 x 20 to Cx    CORONARY ANGIOPLASTY WITH STENT PLACEMENT  01/03/2018    MELODY- 3.0 x 38 and 2.75 x 26 and 2.75 x 8 and 2.75 x 22 to the LAD    CORONARY ANGIOPLASTY WITH STENT PLACEMENT  10/07/2019    MELODY- 2.5 x 8 to [address] GRAFT N/A 1/27/2020    CORONARY ARTERY BYPASS GRAFTING X 1, ON PUMP BEATING HEART, INTERNAL MAMMARY ARTERY TO LEFT ANTERIOR DESCENDING ARTERY, VAS CATH INSERTION, URI, PLATELET GEL APPLICATION, 5 LEVEL BILATERAL INTERCOSTAL NERVE BLOCK, STERNAL PLATING performed by Anne Marie Bustamante MD at [address] Right 5/16/2019    fem-pop, performed by Kelli Heredia MD at [address]  02/14/2019    CardioMEMs insertion for CHF    ROTATOR CUFF REPAIR Left 04/22/2016  TONSILLECTOMY      TRANSESOPHAGEAL ECHOCARDIOGRAM  2020    TRANSLUMINAL ANGIOPLASTY Left 10/08/2019    with stenting, at [facility]   [address] Left 2020    of the [facility] re-occlusion    TUMOR EXCISION      benign tumors X 2 chest & axilla    UPPER GASTROINTESTINAL ENDOSCOPY  2013    BX barretts, HH, gastritis    UPPER GASTROINTESTINAL ENDOSCOPY  12/10/2015    UPPER GASTROINTESTINAL ENDOSCOPY  2017    Gastritis    VASCULAR SURGERY Left 2015    upper extremity and mesenteric angiogram (diagnostic only)    VASCULAR SURGERY Bilateral 2020    diagnostic lower extremity angiogram only    VASCULAR SURGERY Left 2020    angioplasty and stent of renal artery    VASCULAR SURGERY Left 2020    coil embolization of branch renal artery vessel for bleeding    VASCULAR SURGERY Left 2020    angioplasty and stenting of subclavian artery        Allergies:  Lisinopril      Social History     Occupational History    Not on file   Tobacco Use    Smoking status: Current Every Day Smoker     Packs/day: 0.50     Years: 30.00     Pack years: 15.00     Types: Cigarettes    Smokeless tobacco: Never Used   Substance and Sexual Activity    Alcohol use: No     Comment: quit      Drug use: Never    Sexual activity: Yes     Partners: Male          Family History   Problem Relation Age of Onset    Heart Disease Maternal Grandmother     Cancer Mother         lung and brain cancer    Cancer Maternal Aunt         lung and brain cancer       Review of Systems: 12 systems ROS was reviewed and was negative except as in HPI      Physical exam:     Vitals:    20 1106   BP: 89/65   Pulse: 107   Resp: 17   Temp:    SpO2: 93%    Temp (24hrs), Av.8 °F (36.6 °C), Min:97.6 °F (36.4 °C), Max:97.9 °F (36.6 °C)   & BP  Min: 89/65  Max: 135/70 Pulse  Av.4  Min: 97  Max: 108    24HR INTAKE/OUTPUT:  No intake or output data in the 24 hours ending 20 7856 Gen: Resting in bed, in no acute distress, lethargic   Head: NC , AT  Neck: supple, trachea in midline, no overt thyromegaly noted  CV: S1, S2 heard ,trace to +1 peripheral edema, no JVD    Lungs: noincreased WOB, no abdominal breathing pattern, +b/l air entry, no crackles  Abd: Soft, not tender , not distended , no guarding   Neuro: alert and awake, moves extremities spontaneously  Skin: no rash/lesions on exposed skin  Ext: no cyanosis no clubbing on fingers    Database  Lab Results   Component Value Date    WBC 13.6 (H) 12/21/2020    HGB 14.7 12/21/2020    HCT 43.9 12/21/2020    MCV 86.0 12/21/2020     12/21/2020           Lab Results   Component Value Date    IRON 63 10/14/2020    TIBC 374 10/14/2020    FERRITIN 159.2 (H) 10/14/2020       Lab Results   Component Value Date     12/21/2020    K 2.9 12/21/2020    CL 85 12/21/2020    CO2 37 12/21/2020    BUN 58 12/21/2020    CREATININE 1.6 12/21/2020    GLUCOSE 202 12/21/2020    CALCIUM 9.3 12/21/2020        Lab Results   Component Value Date    CALCIUM 9.3 12/21/2020    CAION 1.17 01/28/2020    PHOS 3.1 12/21/2020       Lab Results   Component Value Date    LABALBU 3.5 12/21/2020       Labs Renal Latest Ref Rng & Units 12/21/2020 12/21/2020 12/21/2020 12/20/2020 12/4/2020   BUN 7 - 20 mg/dL 58(H) 70(H) - 88(HH) 33(H)   Cr 0.6 - 1.1 mg/dL 1.6(H) 2. 1(H) - 2. 8(H) 1. 3(H)   K 3.5 - 5.1 mmol/L 2. 9(LL) 2. 9(LL) 4.8 2.9(LL) 3.7   Na 136 - 145 mmol/L 130(L) 132(L) - 116(LL) 136       Lab Results   Component Value Date    MG 2.00 12/21/2020       UA   Lab Results   Component Value Date    SPECGRAV <=1.005 12/20/2020    BLOODU Negative 12/20/2020    LEUKOCYTESUR Negative 12/20/2020    WBCUA 0-2 11/30/2020    RBCUA None seen 11/30/2020       RADIOLOGY     Lab Results   Component Value Date    LVEF 51 08/22/2020       Lab Results   Component Value Date    CREATININE 1.6 (H) 12/21/2020    CREATININE 2.1 (H) 12/21/2020    CREATININE 2.8 (H) 12/20/2020 CREATININE 1.3 (H) 12/04/2020    CREATININE 1.2 (H) 12/03/2020       Wt Readings from Last 3 Encounters:   12/21/20 212 lb (96.2 kg)   12/20/20 212 lb (96.2 kg)   12/03/20 218 lb 3.2 oz (99 kg)       BP Readings from Last 3 Encounters:   12/21/20 89/65   12/21/20 132/86   12/03/20 105/66           Lab Results   Component Value Date    LABALBU 3.5 12/21/2020         Scheduled Meds:   ARIPiprazole  10 mg Oral Daily    aspirin EC  81 mg Oral Daily    atorvastatin  80 mg Oral Nightly    benztropine  1 mg Oral Nightly    midodrine  10 mg Oral TID WC    enoxaparin  30 mg Subcutaneous BID    sodium chloride flush  10 mL Intravenous 2 times per day    insulin lispro  0-12 Units Subcutaneous TID WC    insulin lispro  0-6 Units Subcutaneous Nightly    [START ON 12/22/2020] insulin glargine  30 Units Subcutaneous Daily    mupirocin   Nasal BID    potassium bicarbonate  50 mEq Oral Q2H    busPIRone  30 mg Oral BID    clopidogrel  75 mg Oral Daily    lamoTRIgine  200 mg Oral BID    pantoprazole  40 mg Oral BID     Continuous Infusions:   dextrose       PRN Meds:.acetaminophen **OR** acetaminophen, sodium chloride flush, polyethylene glycol, glucose, dextrose, glucagon (rDNA), dextrose, trimethobenzamide        Assessment & Plan:      Assessment:  - SHUANNA:  Most likely prerenal from hyperglycemia/DKA and N/V and decrease in PO intake   Creatinine baseline seems to be around 1.5 mg/dl  Expect some degree of diabetic nephropathy       Plan:  - patient improving already after treating her DKA/IVF  - agree with careful monitoring of her sodium, although initial sodium was around 130 when corrected for hyperglycemia of 800 , now off IVF.  Seems like a good idea if she can maintain PO intake also given her CHF hx    - avoid all nephrotoxins as able  - strict Is & Os, daily weights  - hold off on renal ultrasound    - Hyponatremia: initially 116 but when corrected for hyperglycemia of 840~ around 130, so not much changed As above      - Hypokalemia: replete        - DM: last HBA1C 7.2 in June 2020  Per primary team      - hypotension: on midodrine       - CHF: diastolic, compensated    D/w nurse at bedside    CC time: +30 mins        Thank you very much for asking us to participate in your patient's care. Do call me if you have any questions regarding the plan of care as outlined.     Gómez Alvarado

## 2020-12-22 NOTE — PROGRESS NOTES
has a past medical history of Acute blood loss anemia, Acute kidney injury ([address] Acute kidney injury superimposed on CKD ([address] Acute on chronic combined systolic and diastolic congestive heart failure ([address] Acute on chronic right-sided heart failure ([address] Alcohol dependence ([address] Cellulitis, Diabetic ulcer of left foot associated with type 2 diabetes mellitus, limited to breakdown of skin ([address] Diabetic ulcer of toe of right foot associated with type 2 diabetes mellitus ([address] Left renal artery stenosis ([address] MI (myocardial infarction) ([address] Positive FIT (fecal immunochemical test), 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 has a past surgical history that includes Tonsillectomy; Cholecystectomy; tumor excision; Upper gastrointestinal endoscopy (4/25/2013); Upper gastrointestinal endoscopy (12/10/2015); Upper gastrointestinal endoscopy (01/06/2017); Arterial bypass surgry (Left, 01/06/2016); Cardiac catheterization (03/27/2018); Coronary angioplasty with stent (03/16/2018); Rotator cuff repair (Left, 04/22/2016); Coronary angioplasty with stent (01/03/2018); Insertable Cardiac Monitor (02/14/2019); femoral bypass (Right, 5/16/2019); transluminal angioplasty (Left, 10/08/2019); Cardiac catheterization (01/20/2020); Coronary artery bypass graft (N/A, 1/27/2020); vascular surgery (Left, 12/08/2015); transluminal angioplasty (Left, 04/29/2020); vascular surgery (Bilateral, 07/07/2020); Coronary angioplasty with stent (10/07/2019); transesophageal echocardiogram (01/26/2020); vascular surgery (Left, 08/04/2020); vascular surgery (Left, 08/05/2020); and vascular surgery (Left, 09/01/2020).     Restrictions  Restrictions/Precautions  Restrictions/Precautions: General Precautions, Fall Risk, Up as Tolerated  Vision/Hearing  Vision: Within Functional Limits  Hearing: Within functional limits     Subjective  General Quality of Gait: Limited weight bearing through LLE d/t pain initially. Improved with distance. Cues for use of RW including increased use of UE as needed and sequencing. Step to pattern with no LOB  Gait Deviations: Decreased step length;Shuffles; Slow Melissa;Decreased step height  Distance: 15 ft x 2     Balance  Sitting - Static: Good  Sitting - Dynamic: Good  Standing - Static: Fair;+  Standing - Dynamic: [facility]  Times per week: 3-5x/wk  Times per day: Daily  Specific instructions for Next Treatment: progress mobility as tolerated  Current Treatment Recommendations: Strengthening, Neuromuscular Re-education, Home Exercise Program, ROM, Safety Education & Training, Balance Training, Endurance Training, Functional Mobility Training, Transfer Training, Gait Training, Stair training, Equipment Evaluation, Education, & procurement, Patient/Caregiver Education & Training  Safety Devices  Type of devices: All fall risk precautions in place, Call light within reach, Chair alarm in place, Gait belt, Patient at risk for falls, Nurse notified, Left in chair                        AM-PAC Score  AM-PAC Inpatient Mobility Raw Score : 20 (12/22/20 1053)  AM-PAC Inpatient T-Scale Score : 47.67 (12/22/20 1053)  Mobility Inpatient CMS 0-100% Score: 35.83 (12/22/20 1053)  Mobility Inpatient CMS G-Code Modifier : Kamini Bernarducier (12/22/20 1053)          Goals  Short term goals  Time Frame for Short term goals: 1 week (12/29) unless otherwise specified  Short term goal 1: Pt will be mod I with bed mobility. Short term goal 2: Pt will be supervision for transfers with RW. Short term goal 3: Pt will ambulate 100 ft with supervision and RW. Short term goal 4: Pt will negotiate 3 stairs with rails and SBA. Short term goal 5: 12/26: pt will participate in 12-15 reps of BLE exercises to promote strength and activity tolerance.   Patient Goals   Patient goals : \"to go home\"       Therapy Time Individual Concurrent Group Co-treatment   Time In 0940         Time Out 1000         Minutes 20         Timed Code Treatment Minutes: Ginna Sanchez [id number], PT, DPT  If pt is unable to be seen after this session, please let this note serve as discharge summary. Please see case management note for discharge disposition. Thank you.

## 2020-12-22 NOTE — PROGRESS NOTES
has a past medical history of Acute blood loss anemia, Acute kidney injury ([address] Acute kidney injury superimposed on CKD ([address] Acute on chronic combined systolic and diastolic congestive heart failure ([address] Acute on chronic right-sided heart failure ([address] Alcohol dependence ([address] Cellulitis, Diabetic ulcer of left foot associated with type 2 diabetes mellitus, limited to breakdown of skin ([address] Diabetic ulcer of toe of right foot associated with type 2 diabetes mellitus ([address] Left renal artery stenosis ([address] MI (myocardial infarction) ([address] Positive FIT (fecal immunochemical test), 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 has a past surgical history that includes Tonsillectomy; Cholecystectomy; tumor excision; Upper gastrointestinal endoscopy (4/25/2013); Upper gastrointestinal endoscopy (12/10/2015); Upper gastrointestinal endoscopy (01/06/2017); Arterial bypass surgry (Left, 01/06/2016); Cardiac catheterization (03/27/2018); Coronary angioplasty with stent (03/16/2018); Rotator cuff repair (Left, 04/22/2016); Coronary angioplasty with stent (01/03/2018); Insertable Cardiac Monitor (02/14/2019); femoral bypass (Right, 5/16/2019); transluminal angioplasty (Left, 10/08/2019); Cardiac catheterization (01/20/2020); Coronary artery bypass graft (N/A, 1/27/2020); vascular surgery (Left, 12/08/2015); transluminal angioplasty (Left, 04/29/2020); vascular surgery (Bilateral, 07/07/2020); Coronary angioplasty with stent (10/07/2019); transesophageal echocardiogram (01/26/2020); vascular surgery (Left, 08/04/2020); vascular surgery (Left, 08/05/2020); and vascular surgery (Left, 09/01/2020).            Restrictions  Restrictions/Precautions  Restrictions/Precautions: General Precautions, Fall Risk, Up as Tolerated    Subjective   General  Chart Reviewed: Yes  Patient assessed for rehabilitation services?: Yes Gross LUE Strength: WFL  RUE Strength  Gross RUE Strength: WFL                   Plan   Plan  Times per week: 4-5x/wk  Current Treatment Recommendations: Endurance Training, Balance Training, Safety Education & Training, Self-Care / ADL             AM-PAC Score        AM-PAC Inpatient Daily Activity Raw Score: 18 (12/22/20 1140)  AM-PAC Inpatient ADL T-Scale Score : 38.66 (12/22/20 1140)  ADL Inpatient CMS 0-100% Score: 46.65 (12/22/20 1140)  ADL Inpatient CMS G-Code Modifier : CK (12/22/20 1140)    Goals  Short term goals  Time Frame for Short term goals: 1 week (12/29/20)  Short term goal 1: Pt will complete toilet transfer with S.  Short term goal 2: Pt will complete LB dressing with s. Short term goal 3: Pt will complete LB dressing with S.  Short term goal 4: Pt will complete 15 reps of BUE exercises for increased endurance and strength. (12/25/20)  Patient Goals   Patient goals : \"to get stronger and go home\"       Therapy Time   Individual Concurrent Group Co-treatment   Time In 0940         Time Out 1000         Minutes 20         Timed Code Treatment Minutes: 10 Minutes(10 minutes for evaluation)       Terence Hines OTR/L    If pt is unable to be seen after this session, please let this note serve as discharge summary. Please see case management note for discharge disposition. Thank you.

## 2020-12-22 NOTE — DISCHARGE SUMMARY
Hospital Medicine Discharge Summary    Patient ID: Giles Coleman      Patient's PCP: Josey Gould PA-C    Admit Date: 12/21/2020     Discharge Date:   12/22/2020    Admitting Physician: Carlos Mcdonald DO     Discharge Physician: Giovanna Espino MD     Discharge Diagnoses: Active Hospital Problems    Diagnosis    DKA, type 2, not at goal [facility]) [E11.10]     Priority: High    SHAUNNA (acute kidney injury) ([facility] [N17.9]    Hyponatremia [E87.1]       The patient was seen and examined on day of discharge and this discharge summary is in conjunction with any daily progress note from day of discharge. HPI :   60 y.o. female who presented to outside ED with nausea, vomiting for about 2 days, she reports shortness of breath, intermittent cough, no sputum production, no fever or diarrhea. Patient endorse generalized weakness.     Upon arrival to the emergency room it was noted that the patient has severe hyperglycemia with blood sugar in the 800, anion gap is elevated, sodium was 116 (corrected hyperglycemia to 126) patient diagnosed with DKA, started on IV insulin, fluid. Potassium was also replaced. Covid 19 PCR was sent     Patient was subsequently transferred to our facility for ICU care, for DKA, during her stay the her gap was closed, insulin drip discontinued.     Of note, Pt reported she had not been taking her insulin at home as prescribed. COVID test was negative     Hospital Course:   Patient was admitted with DKA which was likely due to medication noncompliance and resolved per DKA protocol. Transitioned to subcutaneous insulin. Her electrolytes were replaced with improvement. Patient stated she was no longer on Victoza as it was recently stopped by her PCP. Patient is to continue home dose of Tresiba and Humalog. Compliance with her medications was emphasized and is to follow-up with her PCP in a week with close monitoring of her blood sugars at home.        Physical Exam Performed: /69   Pulse 109   Temp 98 °F (36.7 °C) (Axillary)   Resp 24   Ht 5' 4\" (1.626 m)   Wt 206 lb 9.1 oz (93.7 kg)   LMP 10/24/2012   SpO2 97%   BMI 35.46 kg/m²       General appearance:  No apparent distress, appears stated age and cooperative. HEENT:  Normal cephalic, atraumatic without obvious deformityConjunctivae/corneas clear. Neck: Supple, with full range of motion. No jugular venous distention. Trachea midline. Respiratory:  Normal respiratory effort. Clear to auscultation, bilaterally without Rales/Wheezes/Rhonchi. Cardiovascular:  Regular rate and rhythm with normal S1/S2 without murmurs, rubs or gallops. Abdomen: Soft, non-tender, non-distended with normal bowel sounds. Musculoskeletal:  No clubbing, cyanosis or edema bilaterally. Skin: Warm and dry  Neurologic: Alert, speech clear with no overt facial droop psychiatric:  Alert and oriented, thought content appropriate, normal insight  Capillary Refill: Brisk,< 3 seconds   Peripheral Pulses: +2 palpable, equal bilaterally       Labs:  For convenience and continuity at follow-up the following most recent labs are provided:      CBC:    Lab Results   Component Value Date    WBC 11.0 12/22/2020    HGB 14.9 12/22/2020    HCT 45.0 12/22/2020     12/22/2020       Renal:    Lab Results   Component Value Date     12/22/2020    K 3.6 12/22/2020    CL 85 12/22/2020    CO2 40 12/22/2020    BUN 43 12/22/2020    CREATININE 1.1 12/22/2020    CALCIUM 9.5 12/22/2020    PHOS 3.1 12/21/2020         Significant Diagnostic Studies    Radiology:   No orders to display          Consults:     IP CONSULT TO NEPHROLOGY  IP CONSULT TO PHARMACY  IP CONSULT TO HOME CARE NEEDS  IP CONSULT TO CASE MANAGEMENT    Disposition: Home with home health care as arranged by     Condition at Discharge: Stable    Discharge Instructions/Follow-up:     - Follow up with PCP in one week   - Follow up with nephrologist in 2-3 weeks - Monitor blood glucose closely at home     Code Status:  Full Code     Activity: activity as tolerated    Diet: diabetic diet      Discharge Medications:     Current Discharge Medication List           Details   INSULIN LISPRO SC Inject 10 Units into the skin 3 times daily (before meals)      carvedilol (COREG) 3.125 MG tablet Take 3.125 mg by mouth 2 times daily (with meals)      methocarbamol (ROBAXIN) 500 MG tablet Take 500 mg by mouth 2 times daily      midodrine (PROAMATINE) 5 MG tablet Take 5 mg by mouth 2 times daily      potassium chloride (KLOR-CON M) 20 MEQ extended release tablet Take 40 mEq by mouth 4 times daily      spironolactone (ALDACTONE) 50 MG tablet Take 50 mg by mouth daily      torsemide (DEMADEX) 20 MG tablet Take 60 mg by mouth daily      metOLazone (ZAROXOLYN) 2.5 MG tablet Take 2.5 mg by mouth three times a week On Mon Weds and Friday      nitroGLYCERIN (NITROSTAT) 0.4 MG SL tablet Place 0.4 mg under the tongue every 5 minutes as needed for Chest pain up to max of 3 total doses. If no relief after 1 dose, call 911.        I      tiotropium (SPIRIVA RESPIMAT) 2.5 MCG/ACT AERS inhaler INHALE 2 PUFFS INTO THE LUNGS DAILY  Qty: 1 Inhaler, Refills: 6      Insulin Degludec (TRESIBA FLEXTOUCH) 100 UNIT/ML SOPN Inject 30 Units into the skin nightly  Qty: 10 pen, Refills: 5    Associated Diagnoses: Diabetes mellitus type 2 in obese (HCC)      DOXEPIN HCL PO Take 25 mg by mouth nightly       albuterol sulfate  (90 Base) MCG/ACT inhaler Inhale 2 puffs into the lungs every 6 hours as needed for Wheezing or Shortness of Breath  Qty: 1 Inhaler, Refills: 3      atorvastatin (LIPITOR) 80 MG tablet Take 1 tablet by mouth nightly  Qty: 90 tablet, Refills: 3    Associated Diagnoses: Mixed hyperlipidemia; Coronary artery disease involving native coronary artery of native heart with angina pectoris (HCC); S/P CABG (coronary artery bypass graft) buprenorphine-naloxone (SUBOXONE) 8-2 MG FILM SL film Place 1 Film under the tongue 2 times daily. benztropine (COGENTIN) 1 MG tablet Take 1 mg by mouth nightly       pantoprazole (PROTONIX) 40 MG tablet Take 1 tablet by mouth 2 times daily  Qty: 60 tablet, Refills: 0      clopidogrel (PLAVIX) 75 MG tablet TAKE 1 TABLET BY MOUTH EVERY DAY  Qty: 30 tablet, Refills: 5      busPIRone (BUSPAR) 30 MG tablet Take 30 mg by mouth 2 times daily       aspirin EC 81 MG EC tablet Take 1 tablet by mouth daily  Qty: 30 tablet, Refills: 3      ARIPiprazole (ABILIFY) 15 MG tablet Take 15 mg by mouth daily       Blood Glucose Monitoring Suppl (FREESTYLE FREEDOM LITE) w/Device KIT Use to test glucose 4 times a day  Qty: 1 kit, Refills: 0    Associated Diagnoses: Type 2 diabetes mellitus with diabetic polyneuropathy, with long-term current use of insulin ([facility])      blood glucose test strips (FREESTYLE LITE) strip USE TO CHECK BLOOD GLUCOSE LEVELS FOUR TIMES DAILY  Qty: 200 strip, Refills: 10    Associated Diagnoses: Diabetes mellitus type 2 in obese ([facility])      Insulin Pen Needle (B-D ULTRAFINE III SHORT PEN) 31G X 8 MM MISC Five shots a day  Qty: 200 each, Refills: 2    Associated Diagnoses: Diabetes mellitus type 2 in obese ([facility])      traZODone (DESYREL) 100 MG tablet Take 100 mg by mouth nightly as needed for Sleep Can take 1 or 2 tabs nightly as directed      INSULIN SYRINGE .5CC/29G 29G X 1/2\" 0.5 ML MISC 1 each by Does not apply route daily  Qty: 100 each, Refills: 3    Associated Diagnoses: Diabetes mellitus type 2 in obese ([facility])      magnesium oxide (MAG-OX) 400 (240 Mg) MG tablet TAKE 1 TABLET ONCE DAILY  Qty: 30 tablet, Refills: 11      lamoTRIgine (LAMICTAL) 200 MG tablet Take 200 mg by mouth 2 times daily              Time Spent on discharge is more than 30 minutes in the examination, evaluation, counseling and review of medications and discharge plan.       Signed:    Carmen Kelly MD   12/22/2020 Thank you Bhupinder Lucas PA-C for the opportunity to be involved in this patient's care. If you have any questions or concerns please feel free to contact me at [phone].

## 2020-12-22 NOTE — PROGRESS NOTES
Temp (24hrs), Av.8 °F (36.6 °C), Min:97.6 °F (36.4 °C), Max:98 °F (36.7 °C)   & BP  Min: 94/56  Max: 118/70 Pulse  Av.7  Min: 95  Max: 109    24HR INTAKE/OUTPUT:  No intake or output data in the 24 hours ending 20 1153      Gen: Resting in bed, in no acute distress, lethargic   Head: NC , AT  Neck: supple, trachea in midline, no overt thyromegaly noted  CV: S1, S2 heard ,trace to +1 peripheral edema, no JVD    Lungs: noincreased WOB, no abdominal breathing pattern, +b/l air entry, no crackles  Abd: Soft, not tender , not distended , no guarding   Neuro: alert and awake, moves extremities spontaneously  Skin: no rash/lesions on exposed skin  Ext: no cyanosis no clubbing on fingers    Database  Lab Results   Component Value Date    WBC 11.0 2020    HGB 14.9 2020    HCT 45.0 2020    MCV 87.8 2020     2020           Lab Results   Component Value Date    IRON 63 10/14/2020    TIBC 374 10/14/2020    FERRITIN 159.2 (H) 10/14/2020       Lab Results   Component Value Date     2020    K 3.6 2020    CL 85 2020    CO2 40 2020    BUN 43 2020    CREATININE 1.1 2020    GLUCOSE 257 2020    CALCIUM 9.5 2020        Lab Results   Component Value Date    CALCIUM 9.5 2020    CAION 1.17 2020    PHOS 3.1 2020       Lab Results   Component Value Date    LABALBU 3.3 (L) 2020       Labs Renal Latest Ref Rng & Units 2020   BUN 7 - 20 mg/dL 43(H) 58(H) 70(H) - 88(HH)   Cr 0.6 - 1.1 mg/dL 1.1 1.6(H) 2. 1(H) - 2. 8(H)   K 3.5 - 5.1 mmol/L 3.6 2.9(LL) 2. 9(LL) 4.8 2.9(LL)   Na 136 - 145 mmol/L 131(L) 130(L) 132(L) - 116(LL)       Lab Results   Component Value Date    MG 2.00 2020       UA   Lab Results   Component Value Date    SPECGRAV <=1.005 2020    BLOODU Negative 2020    LEUKOCYTESUR Negative 2020    WBCUA 0-2 2020 RBCUA None seen 11/30/2020       RADIOLOGY     Lab Results   Component Value Date    LVEF 51 08/22/2020       Lab Results   Component Value Date    CREATININE 1.1 12/22/2020    CREATININE 1.6 (H) 12/21/2020    CREATININE 2.1 (H) 12/21/2020    CREATININE 2.8 (H) 12/20/2020    CREATININE 1.3 (H) 12/04/2020       Wt Readings from Last 3 Encounters:   12/22/20 206 lb 9.1 oz (93.7 kg)   12/20/20 212 lb (96.2 kg)   12/03/20 218 lb 3.2 oz (99 kg)       BP Readings from Last 3 Encounters:   12/22/20 113/69   12/21/20 132/86   12/03/20 105/66           Lab Results   Component Value Date    LABALBU 3.3 (L) 12/22/2020         Scheduled Meds:   ARIPiprazole  10 mg Oral Daily    aspirin EC  81 mg Oral Daily    atorvastatin  80 mg Oral Nightly    benztropine  1 mg Oral Nightly    midodrine  10 mg Oral TID WC    enoxaparin  30 mg Subcutaneous BID    sodium chloride flush  10 mL Intravenous 2 times per day    insulin lispro  0-12 Units Subcutaneous TID WC    insulin lispro  0-6 Units Subcutaneous Nightly    insulin glargine  30 Units Subcutaneous Daily    mupirocin   Nasal BID    busPIRone  30 mg Oral BID    clopidogrel  75 mg Oral Daily    lamoTRIgine  200 mg Oral BID    pantoprazole  40 mg Oral BID     Continuous Infusions:   dextrose       PRN Meds:.acetaminophen **OR** acetaminophen, sodium chloride flush, polyethylene glycol, glucose, dextrose, glucagon (rDNA), dextrose, prochlorperazine        Assessment & Plan:      Assessment:  - SHAUNNA: much better  Most likely prerenal from hyperglycemia/DKA and N/V and decrease in PO intake   Creatinine baseline seems to be around 1.5 mg/dl  Expect some degree of diabetic nephropathy       Plan:  - doing much better  Encourage oral hydration   Okay for d/c from my standpoint  Need f/u in 2-3 weeks  Needs education and more compliance with her diabetic regimen    - Hyponatremia: better initially 116 but when corrected for hyperglycemia of 840~ around 130, so not much changed  As above      - Hypokalemia: better      - DM: last HBA1C 7.2 in June 2020  Per primary team      - hypotension: on midodrine       - CHF: diastolic, compensated    D/w nurse at bedside          Thank you very much for asking us to participate in your patient's care. Do call me if you have any questions regarding the plan of care as outlined.     Karla Kang

## 2020-12-22 NOTE — CARE COORDINATION
Juani spoke with pt on this day who notes that she is open to [facility] as has been ordered by MD. Juani notes that pt has worked with [facility] in the past and she is open to working with them again. Juani called Luciano, liaison with [facility] who will pull discharge information from Count includes the [facility] [address] Pt to leave the hospital now. Juani spoke with MD to note that locating [facility] is proving to be very difficult. Juani also communicated this to the pt. MD notes that pt was very ready to leave the hospital and she plans to follow up with her PCP. ADDENDUM: [facility] does not have staffing to service pt. [facility] is unable to take pt at this time. Juani placed call to [facility] and they are not able to accept pt  [facility]. Juani called [facility] [place] and they are not able to accept pt. [facility] is not able to accept pt. [facility] is not able to accept pt's insurance   [facility] can't provide nursing but may be mary to provide PT/OT but not SN. [facility] and they can't accept pt  Sw also called [facility] and they are not able to accept pt. Sw called [facility] and they are not able to accept pt   Sw called [facility] and they are not able to accept pt   [facility] is not able to accept pt. [facility] [facility] and they are reviewing pt now. Juani provided her with CM phone number for the next day. (they can pull orders and CARMEN from [employer])    Juani called [employer] at [phone]  and they are reviewing pt now. Sw provided phone number to follow up in the next day. Pt is aware that we do not have an accepting [facility] agency at this time. We will call her in the next day to ensure this is arranged for her.  Pt Cell # is [phone]

## 2020-12-22 NOTE — DISCHARGE INSTR - COC
Continuity of Care Form    Patient Name: Giles Coleman   :  1965  MRN:  [id number]    Admit date:  2020  Discharge date:  2020    Code Status Order: Full Code   Advance Directives:      Admitting Physician:  Carlos Mcdonald DO  PCP: Josey Gould PA-C    Discharging Nurse: [facility]/Room#: 0903/6646-28  Discharging Unit Phone Number: ***    Emergency Contact:   Extended Emergency Contact Information  Primary Emergency Contact: [address] Phone: [phone]  Relation: Child  Preferred language: English   needed?  No  [facility] Emergency Contact[facility] Phone: [phone]  Relation: Other    Past Surgical History:  Past Surgical History:   Procedure Laterality Date    ARTERIAL BYPASS SURGRY Left 2016    Axillary/Subclavian to Brachial Bypass w/reversed SVG    CARDIAC CATHETERIZATION  2018    Dr. Bacilio Salamanca - at [address] [place]  2020    Dr. Bass Pica      pancreatitis post surgery    CORONARY ANGIOPLASTY WITH STENT PLACEMENT  2018    MELODY- 3.5 x 38 and 3.5 x 20 to Cx    CORONARY ANGIOPLASTY WITH STENT PLACEMENT  2018    MELODY- 3.0 x 38 and 2.75 x 26 and 2.75 x 8 and 2.75 x 22 to the LAD    CORONARY ANGIOPLASTY WITH STENT PLACEMENT  10/07/2019    MELODY- 2.5 x 8 to [address] GRAFT N/A 2020    CORONARY ARTERY BYPASS GRAFTING X 1, ON PUMP BEATING HEART, INTERNAL MAMMARY ARTERY TO LEFT ANTERIOR DESCENDING ARTERY, VAS CATH INSERTION, URI, PLATELET GEL APPLICATION, 5 LEVEL BILATERAL INTERCOSTAL NERVE BLOCK, STERNAL PLATING performed by Gray Kwong MD at [address] Right 2019    fem-pop, performed by Stevie Fernández MD at [address]  2019    CardioMEMs insertion for CHF    ROTATOR CUFF REPAIR Left 2016    TONSILLECTOMY  TRANSESOPHAGEAL ECHOCARDIOGRAM  01/26/2020    TRANSLUMINAL ANGIOPLASTY Left 10/08/2019    with stenting, at [facility]   [address] Left 04/29/2020    of the [facility] re-occlusion    TUMOR EXCISION      benign tumors X 2 chest & axilla    UPPER GASTROINTESTINAL ENDOSCOPY  4/25/2013    BX barretts, HH, gastritis    UPPER GASTROINTESTINAL ENDOSCOPY  12/10/2015    UPPER GASTROINTESTINAL ENDOSCOPY  01/06/2017    Gastritis    VASCULAR SURGERY Left 12/08/2015    upper extremity and mesenteric angiogram (diagnostic only)    VASCULAR SURGERY Bilateral 07/07/2020    diagnostic lower extremity angiogram only    VASCULAR SURGERY Left 08/04/2020    angioplasty and stent of renal artery    VASCULAR SURGERY Left 08/05/2020    coil embolization of branch renal artery vessel for bleeding    VASCULAR SURGERY Left 09/01/2020    angioplasty and stenting of subclavian artery       Immunization History:   Immunization History   Administered Date(s) Administered    Influenza Vaccine, unspecified formulation 11/04/2016    Influenza Virus Vaccine 12/11/2015, 11/21/2017    Influenza, Vera Colindres, IM, (6 mo and older Fluzone, Flulaval, Fluarix and 3 yrs and older Afluria) 12/19/2013, 10/14/2016, 11/09/2017    Influenza, Vera Colindres, IM, PF (6 mo and older Fluzone, Flulaval, Fluarix, and 3 yrs and older Afluria) 10/01/2019    Pneumococcal Polysaccharide ([id number]) 12/11/2015       Active Problems:  Patient Active Problem List   Diagnosis Code    Type 2 diabetes mellitus with diabetic polyneuropathy, with long-term current use of insulin ([facility]) E11.42, Z79.4    Essential hypertension I10    CLINT (obstructive sleep apnea) G47.33    Tobacco abuse disorder Z72.0    GERD (gastroesophageal reflux disease) K21.9    Anxiety and depression F41.9, F32.9    Hyponatremia E87.1    Iron deficiency anemia D50.9    CAD (coronary artery disease) I25.10    Mitral valve regurgitation I34.0  COPD (chronic obstructive pulmonary disease) ([facility]) J44.9    SHAUNNA (acute kidney injury) ([facility] N17.9    Vitamin D deficiency E55.9    Dyslipidemia E78.5    Abel's esophagus K22.70    Viral hepatitis C B19.20    Pulmonary nodule R91.1    Class 2 severe obesity due to excess calories with serious comorbidity and body mass index (BMI) of 39.0 to 39.9 in adult ([facility]) E66.01, Z68.39    Bipolar disorder ([facility]) F31.9    Pulmonary hypertension ([facility]) I27.20    Bilateral lower extremity edema R60.0    Habitual self-excoriation F42.4    Ulcer of left lower leg, limited to breakdown of skin ([facility]) L97.921    Ulcer of right leg, limited to breakdown of skin ([facility] L97.911    Arthritis M19.90    Cardiomyopathy ([facility]) I42.9    Chronic systolic heart failure ([facility]) I50.22    Peripheral venous insufficiency I87.2    Atherosclerosis of native artery of both lower extremities with bilateral ulceration of calves ([facility]) I70.232, I70.242    Morbid obesity with BMI of 40.0-44.9, adult ([facility]) E66.01, Z68.41    CKD (chronic kidney disease) stage 4, GFR 15-29 ml/min ([facility]) N18.4    Hypokalemia E87.6    Cough R05    Dyspnea on exertion R06.00    Ischemic heart disease I25.9    Moderate protein-calorie malnutrition ([facility]) E44.0    DKA, type 2, not at goal [facility]) E11.10       Isolation/Infection:   Isolation            No Isolation          Patient Infection Status       Infection Onset Added Last Indicated Last Indicated By Review Planned Expiration Resolved Resolved By    None active    Resolved    COVID-19 Rule Out 12/21/20 12/21/20 12/21/20 COVID-19 (Ordered)   12/22/20 Rule-Out Test Resulted    COVID-19 Rule Out 11/30/20 11/30/20 11/30/20 COVID-19 (Ordered)   11/30/20 Rule-Out Test Resulted            Nurse Assessment:  Last Vital Signs: /69   Pulse 109   Temp 98 °F (36.7 °C) (Axillary)   Resp 24   Ht 5' 4\" (1.626 m)   Wt 206 lb 9.1 oz (93.7 kg)   LMP 10/24/2012   SpO2 97%   BMI 35.46 kg/m² Last documented pain score (0-10 scale): Pain Level: 0  Last Weight:   Wt Readings from Last 1 Encounters:   20 206 lb 9.1 oz (93.7 kg)     Mental Status:  {IP PT MENTAL STATUS:41578:::0}    IV Access:  { CARMEN IV ACCESS:956381805:::0}    Nursing Mobility/ADLs:  Walking   {CHP DME ADLs:538952858:::0}  Transfer  {CHP DME ADLs:342476005:::0}  Bathing  {CHP DME ADLs:307147349:::0}  Dressing  {CHP DME ADLs:904233662:::0}  Toileting  {CHP DME ADLs:922863673:::0}  Feeding  {CHP DME ADLs:298403101:::0}  Med Admin  {CHP DME ADLs:915805484:::0}  Med Delivery   { CARMEN MED Delivery:001273995:::0}    Wound Care Documentation and Therapy:        Elimination:  Continence:   · Bowel: {YES / }  · Bladder: {YES / FM:58164}  Urinary Catheter: {Urinary Catheter:054879327:::0}   Colostomy/Ileostomy/Ileal Conduit: {YES / EW:58651}       Date of Last BM: ***  No intake or output data in the 24 hours ending 20 1341  No intake/output data recorded.     Safety Concerns:     [address] Safety Concerns:528957255:::0}    Impairments/Disabilities:      [address] [employer] Impairments/Disabilities:626044247:::0}    Nutrition Therapy:  Current Nutrition Therapy:   [address] [employer] Diet List:299686432:::0}    Routes of Feeding: {CHP DME Other Feedings:194113370:::0}  Liquids: {Slp liquid thickness:94225}  Daily Fluid Restriction: {CHP DME Yes amt example:248578877:::0}  Last Modified Barium Swallow with Video (Video Swallowing Test): {Done Not Done PPBA:444287214:::7}    Treatments at the Time of Hospital Discharge:   Respiratory Treatments: ***  Oxygen Therapy:  {Therapy; copd oxygen:53345:::0}  Ventilator:    { CC Vent List:448503013:::0}    Rehab Therapies: {THERAPEUTIC INTERVENTION:5442445470}  Weight Bearing Status/Restrictions: [address] CC Weight Bearin:::0}  Other Medical Equipment (for information only, NOT a DME order):  {EQUIPMENT:362994060}  Other Treatments: *** Patient's personal belongings (please select all that are sent with patient):  {CHP DME Belongings:188285956:::0}    RN SIGNATURE:  {Esignature:679587291:::0}    CASE MANAGEMENT/SOCIAL WORK SECTION    Inpatient Status Date: 12/21/2020    Readmission Risk Assessment Score:  Readmission Risk              Risk of Unplanned Readmission:        61           Discharging to Facility/ Agency   · Name:   · Address:   · Phone:   · Fax:    / signature: Electronically signed by KATIE Castellon LSW on 12/22/20 at 2:21 PM EST    PHYSICIAN SECTION    Prognosis: Good    Condition at Discharge: Stable    Rehab Potential (if transferring to Rehab): Good    Recommended Labs or Other Treatments After Discharge: Follow up with PCP in one week   Follow up with nephrologist in 2-3 weeks  Monitor blood glucose closely at home     Physician Certification: I certify the above information and transfer of Yany Shah  is necessary for the continuing treatment of the diagnosis listed and that she requires [address] for less 30 days.      Update Admission H&P: No change in H&P    PHYSICIAN SIGNATURE:  Electronically signed by Evita Suh MD on 12/22/20 at 1:42 PM EST

## 2020-12-22 NOTE — PROGRESS NOTES
Take the pt down to her family Alert and oriented times 4, on RA and walked to wheelchair that took her to her family car

## 2020-12-23 NOTE — CARE COORDINATION
12/23/20 [address] call to [facility] and spoke to Radhames. Who is going to follow up on this. Radhames or another staff member to return call to writer once determination is made. VM received from [facility] with [employer] and they are unable to staff for this patient due to the holiday.      CM will await response from Allyson Suazo RN

## 2021-01-01 ENCOUNTER — PROCEDURE VISIT (OUTPATIENT)
Dept: CARDIOLOGY CLINIC | Age: 56
End: 2021-01-01
Payer: COMMERCIAL

## 2021-01-01 ENCOUNTER — APPOINTMENT (OUTPATIENT)
Dept: GENERAL RADIOLOGY | Age: 56
DRG: 291 | End: 2021-01-01
Payer: MEDICARE

## 2021-01-01 ENCOUNTER — HOSPITAL ENCOUNTER (OUTPATIENT)
Age: 56
Discharge: HOME | End: 2021-04-29

## 2021-01-01 ENCOUNTER — APPOINTMENT (OUTPATIENT)
Dept: GENERAL RADIOLOGY | Age: 56
DRG: 426 | End: 2021-01-01
Payer: COMMERCIAL

## 2021-01-01 ENCOUNTER — HOSPITAL ENCOUNTER (OUTPATIENT)
Age: 56
Discharge: HOME | End: 2021-03-15
Payer: COMMERCIAL

## 2021-01-01 ENCOUNTER — HOSPITAL ENCOUNTER (EMERGENCY)
Age: 56
Discharge: HOME | End: 2021-06-07
Attending: EMERGENCY MEDICINE
Payer: MEDICARE

## 2021-01-01 ENCOUNTER — HOSPITAL ENCOUNTER (OUTPATIENT)
Dept: VASCULAR LAB | Age: 56
Discharge: HOME | End: 2021-06-18
Payer: MEDICARE

## 2021-01-01 ENCOUNTER — APPOINTMENT (OUTPATIENT)
Dept: GENERAL RADIOLOGY | Age: 56
End: 2021-01-01
Payer: MEDICARE

## 2021-01-01 ENCOUNTER — APPOINTMENT (OUTPATIENT)
Dept: VASCULAR LAB | Age: 56
DRG: 194 | End: 2021-01-01
Payer: COMMERCIAL

## 2021-01-01 ENCOUNTER — TELEPHONE (OUTPATIENT)
Dept: CARDIOLOGY CLINIC | Age: 56
End: 2021-01-01

## 2021-01-01 ENCOUNTER — APPOINTMENT (OUTPATIENT)
Dept: INTERVENTIONAL RADIOLOGY/VASCULAR | Age: 56
DRG: 871 | End: 2021-01-01
Payer: MEDICARE

## 2021-01-01 ENCOUNTER — HOSPITAL ENCOUNTER (OUTPATIENT)
Dept: GENERAL RADIOLOGY | Age: 56
Discharge: HOME | End: 2021-05-08
Payer: MEDICARE

## 2021-01-01 ENCOUNTER — FOLLOWUP TELEPHONE ENCOUNTER (OUTPATIENT)
Dept: MEDSURG UNIT | Age: 56
End: 2021-01-01

## 2021-01-01 ENCOUNTER — APPOINTMENT (OUTPATIENT)
Dept: GENERAL RADIOLOGY | Age: 56
DRG: 438 | End: 2021-01-01
Payer: MEDICARE

## 2021-01-01 ENCOUNTER — APPOINTMENT (OUTPATIENT)
Dept: GENERAL RADIOLOGY | Age: 56
DRG: 871 | End: 2021-01-01
Payer: MEDICARE

## 2021-01-01 ENCOUNTER — VIRTUAL VISIT (OUTPATIENT)
Dept: PULMONOLOGY | Age: 56
End: 2021-01-01
Payer: COMMERCIAL

## 2021-01-01 ENCOUNTER — HOSPITAL ENCOUNTER (INPATIENT)
Age: 56
LOS: 11 days | Discharge: NURSING, REHAB OR LONG-TERM CARE | DRG: 291 | End: 2021-07-14
Attending: EMERGENCY MEDICINE
Payer: MEDICARE

## 2021-01-01 ENCOUNTER — APPOINTMENT (OUTPATIENT)
Dept: GENERAL RADIOLOGY | Age: 56
DRG: 299 | End: 2021-01-01
Payer: MEDICARE

## 2021-01-01 ENCOUNTER — TELEPHONE (OUTPATIENT)
Dept: OTHER | Facility: CLINIC | Age: 56
End: 2021-01-01

## 2021-01-01 ENCOUNTER — HOSPITAL ENCOUNTER (OUTPATIENT)
Dept: NURSING | Age: 56
Setting detail: INFUSION SERIES
Discharge: HOME | End: 2021-06-17
Payer: MEDICARE

## 2021-01-01 ENCOUNTER — APPOINTMENT (OUTPATIENT)
Dept: INTERVENTIONAL RADIOLOGY/VASCULAR | Age: 56
DRG: 291 | End: 2021-01-01
Payer: MEDICARE

## 2021-01-01 ENCOUNTER — APPOINTMENT (OUTPATIENT)
Dept: CT IMAGING | Age: 56
DRG: 299 | End: 2021-01-01
Payer: MEDICARE

## 2021-01-01 ENCOUNTER — APPOINTMENT (OUTPATIENT)
Dept: CT IMAGING | Age: 56
DRG: 438 | End: 2021-01-01
Payer: MEDICARE

## 2021-01-01 ENCOUNTER — HOSPITAL ENCOUNTER (OUTPATIENT)
Dept: PULMONOLOGY | Age: 56
Discharge: HOME | End: 2021-02-01
Payer: COMMERCIAL

## 2021-01-01 ENCOUNTER — HOSPITAL ENCOUNTER (OUTPATIENT)
Age: 56
Discharge: HOME | End: 2021-01-20
Payer: COMMERCIAL

## 2021-01-01 ENCOUNTER — HOSPITAL ENCOUNTER (OUTPATIENT)
Dept: GENERAL RADIOLOGY | Age: 56
Discharge: HOME | End: 2021-05-20
Payer: MEDICARE

## 2021-01-01 ENCOUNTER — APPOINTMENT (OUTPATIENT)
Dept: ULTRASOUND IMAGING | Age: 56
DRG: 291 | End: 2021-01-01
Attending: INTERNAL MEDICINE
Payer: MEDICARE

## 2021-01-01 ENCOUNTER — TELEPHONE (OUTPATIENT)
Dept: ENDOCRINOLOGY | Age: 56
End: 2021-01-01

## 2021-01-01 ENCOUNTER — OFFICE VISIT (OUTPATIENT)
Dept: PULMONOLOGY | Age: 56
End: 2021-01-01
Payer: MEDICARE

## 2021-01-01 ENCOUNTER — HOSPITAL ENCOUNTER (OUTPATIENT)
Dept: WOUND CARE | Age: 56
Discharge: HOME | End: 2021-09-22
Payer: MEDICARE

## 2021-01-01 ENCOUNTER — HOSPITAL ENCOUNTER (INPATIENT)
Age: 56
LOS: 3 days | Discharge: NURSING, REHAB OR LONG-TERM CARE | DRG: 299 | End: 2021-09-10
Attending: EMERGENCY MEDICINE | Admitting: INTERNAL MEDICINE
Payer: MEDICARE

## 2021-01-01 ENCOUNTER — PROCEDURE VISIT (OUTPATIENT)
Dept: CARDIOLOGY CLINIC | Age: 56
End: 2021-01-01
Payer: MEDICARE

## 2021-01-01 ENCOUNTER — APPOINTMENT (OUTPATIENT)
Dept: CT IMAGING | Age: 56
DRG: 871 | End: 2021-01-01
Payer: MEDICARE

## 2021-01-01 ENCOUNTER — HOSPITAL ENCOUNTER (OUTPATIENT)
Age: 56
Discharge: HOME | End: 2021-05-04
Payer: MEDICARE

## 2021-01-01 ENCOUNTER — HOSPITAL ENCOUNTER (EMERGENCY)
Age: 56
End: 2021-09-25
Attending: EMERGENCY MEDICINE
Payer: MEDICARE

## 2021-01-01 ENCOUNTER — HOSPITAL ENCOUNTER (INPATIENT)
Age: 56
LOS: 6 days | Discharge: HOME | DRG: 438 | End: 2021-08-13
Attending: STUDENT IN AN ORGANIZED HEALTH CARE EDUCATION/TRAINING PROGRAM | Admitting: INTERNAL MEDICINE
Payer: MEDICARE

## 2021-01-01 ENCOUNTER — HOSPITAL ENCOUNTER (INPATIENT)
Age: 56
LOS: 4 days | Discharge: HOME | DRG: 426 | End: 2021-04-26
Attending: EMERGENCY MEDICINE | Admitting: INTERNAL MEDICINE
Payer: COMMERCIAL

## 2021-01-01 ENCOUNTER — APPOINTMENT (OUTPATIENT)
Dept: GENERAL RADIOLOGY | Age: 56
DRG: 291 | End: 2021-01-01
Attending: INTERNAL MEDICINE
Payer: MEDICARE

## 2021-01-01 ENCOUNTER — OFFICE VISIT (OUTPATIENT)
Dept: CARDIOLOGY CLINIC | Age: 56
End: 2021-01-01
Payer: COMMERCIAL

## 2021-01-01 ENCOUNTER — HOSPITAL ENCOUNTER (EMERGENCY)
Age: 56
Discharge: HOME | DRG: 299 | End: 2021-09-04
Attending: EMERGENCY MEDICINE
Payer: MEDICARE

## 2021-01-01 ENCOUNTER — HOSPITAL ENCOUNTER (OUTPATIENT)
Age: 56
Discharge: HOME | End: 2021-05-20
Payer: MEDICARE

## 2021-01-01 ENCOUNTER — HOSPITAL ENCOUNTER (OUTPATIENT)
Dept: ULTRASOUND IMAGING | Age: 56
Discharge: HOME | End: 2021-02-24
Payer: COMMERCIAL

## 2021-01-01 ENCOUNTER — HOSPITAL ENCOUNTER (OUTPATIENT)
Dept: MRI IMAGING | Age: 56
Discharge: HOME | End: 2021-05-24
Payer: MEDICARE

## 2021-01-01 ENCOUNTER — HOSPITAL ENCOUNTER (OUTPATIENT)
Age: 56
Discharge: HOME | End: 2021-05-08
Payer: MEDICARE

## 2021-01-01 ENCOUNTER — HOSPITAL ENCOUNTER (OUTPATIENT)
Dept: GENERAL RADIOLOGY | Age: 56
Discharge: HOME | End: 2021-01-20
Payer: COMMERCIAL

## 2021-01-01 ENCOUNTER — HOSPITAL ENCOUNTER (INPATIENT)
Age: 56
LOS: 8 days | Discharge: NURSING, REHAB OR LONG-TERM CARE | DRG: 871 | End: 2021-08-03
Attending: EMERGENCY MEDICINE | Admitting: INTERNAL MEDICINE
Payer: MEDICARE

## 2021-01-01 ENCOUNTER — APPOINTMENT (OUTPATIENT)
Dept: GENERAL RADIOLOGY | Age: 56
DRG: 194 | End: 2021-01-01
Payer: COMMERCIAL

## 2021-01-01 ENCOUNTER — HOSPITAL ENCOUNTER (EMERGENCY)
Age: 56
Discharge: HOME | End: 2021-03-16
Attending: EMERGENCY MEDICINE
Payer: COMMERCIAL

## 2021-01-01 ENCOUNTER — APPOINTMENT (OUTPATIENT)
Dept: MRI IMAGING | Age: 56
DRG: 871 | End: 2021-01-01
Payer: MEDICARE

## 2021-01-01 ENCOUNTER — HOSPITAL ENCOUNTER (OUTPATIENT)
Dept: GENERAL RADIOLOGY | Age: 56
Discharge: HOME | End: 2021-04-29

## 2021-01-01 ENCOUNTER — TELEPHONE (OUTPATIENT)
Dept: VASCULAR SURGERY | Age: 56
End: 2021-01-01

## 2021-01-01 ENCOUNTER — FOLLOWUP TELEPHONE ENCOUNTER (OUTPATIENT)
Dept: TELEMETRY | Age: 56
End: 2021-01-01

## 2021-01-01 ENCOUNTER — HOSPITAL ENCOUNTER (OUTPATIENT)
Dept: PULMONOLOGY | Age: 56
Discharge: HOME | End: 2021-04-29

## 2021-01-01 ENCOUNTER — HOSPITAL ENCOUNTER (INPATIENT)
Age: 56
LOS: 3 days | Discharge: HOME | DRG: 194 | End: 2021-01-14
Attending: STUDENT IN AN ORGANIZED HEALTH CARE EDUCATION/TRAINING PROGRAM | Admitting: INTERNAL MEDICINE
Payer: COMMERCIAL

## 2021-01-01 ENCOUNTER — PROCEDURE VISIT (OUTPATIENT)
Dept: CARDIOLOGY CLINIC | Age: 56
End: 2021-01-01

## 2021-01-01 ENCOUNTER — HOSPITAL ENCOUNTER (INPATIENT)
Age: 56
LOS: 10 days | Discharge: NURSING, REHAB OR LONG-TERM CARE | DRG: 291 | End: 2021-07-01
Attending: INTERNAL MEDICINE | Admitting: INTERNAL MEDICINE
Payer: MEDICARE

## 2021-01-01 ENCOUNTER — OFFICE VISIT (OUTPATIENT)
Dept: VASCULAR SURGERY | Age: 56
End: 2021-01-01
Payer: MEDICARE

## 2021-01-01 ENCOUNTER — HOSPITAL ENCOUNTER (EMERGENCY)
Age: 56
Discharge: LEFT BEFORE DISCHARGE | End: 2021-07-20
Attending: STUDENT IN AN ORGANIZED HEALTH CARE EDUCATION/TRAINING PROGRAM
Payer: MEDICARE

## 2021-01-01 ENCOUNTER — HOSPITAL ENCOUNTER (OUTPATIENT)
Dept: CT IMAGING | Age: 56
Discharge: HOME | End: 2021-09-13
Payer: MEDICARE

## 2021-01-01 ENCOUNTER — VIRTUAL VISIT (OUTPATIENT)
Dept: ENDOCRINOLOGY | Age: 56
End: 2021-01-01
Payer: COMMERCIAL

## 2021-01-01 ENCOUNTER — HOSPITAL ENCOUNTER (OUTPATIENT)
Dept: GENERAL RADIOLOGY | Age: 56
Discharge: HOME | End: 2021-03-15
Payer: COMMERCIAL

## 2021-01-01 ENCOUNTER — APPOINTMENT (OUTPATIENT)
Dept: ULTRASOUND IMAGING | Age: 56
DRG: 871 | End: 2021-01-01
Payer: MEDICARE

## 2021-01-01 ENCOUNTER — APPOINTMENT (OUTPATIENT)
Dept: CT IMAGING | Age: 56
End: 2021-01-01
Payer: MEDICARE

## 2021-01-01 ENCOUNTER — APPOINTMENT (OUTPATIENT)
Dept: GENERAL RADIOLOGY | Age: 56
End: 2021-01-01
Payer: COMMERCIAL

## 2021-01-01 ENCOUNTER — HOSPITAL ENCOUNTER (EMERGENCY)
Age: 56
Discharge: ANOTHER HOSPITAL | End: 2021-06-21
Attending: EMERGENCY MEDICINE
Payer: MEDICARE

## 2021-01-01 ENCOUNTER — OFFICE VISIT (OUTPATIENT)
Dept: CARDIOLOGY CLINIC | Age: 56
End: 2021-01-01
Payer: MEDICARE

## 2021-01-01 VITALS
SYSTOLIC BLOOD PRESSURE: 100 MMHG | WEIGHT: 245.8 LBS | DIASTOLIC BLOOD PRESSURE: 60 MMHG | HEART RATE: 83 BPM | OXYGEN SATURATION: 100 % | BODY MASS INDEX: 40.95 KG/M2 | HEIGHT: 65 IN | TEMPERATURE: 97.9 F

## 2021-01-01 VITALS
HEIGHT: 65 IN | OXYGEN SATURATION: 98 % | BODY MASS INDEX: 40.82 KG/M2 | WEIGHT: 245 LBS | TEMPERATURE: 97.6 F | HEART RATE: 82 BPM | RESPIRATION RATE: 24 BRPM | SYSTOLIC BLOOD PRESSURE: 88 MMHG | DIASTOLIC BLOOD PRESSURE: 65 MMHG

## 2021-01-01 VITALS
HEART RATE: 110 BPM | SYSTOLIC BLOOD PRESSURE: 129 MMHG | RESPIRATION RATE: 20 BRPM | OXYGEN SATURATION: 97 % | TEMPERATURE: 98 F | WEIGHT: 223 LBS | HEIGHT: 64 IN | DIASTOLIC BLOOD PRESSURE: 97 MMHG | BODY MASS INDEX: 38.07 KG/M2

## 2021-01-01 VITALS
RESPIRATION RATE: 16 BRPM | TEMPERATURE: 98.8 F | OXYGEN SATURATION: 96 % | SYSTOLIC BLOOD PRESSURE: 105 MMHG | DIASTOLIC BLOOD PRESSURE: 63 MMHG | BODY MASS INDEX: 42.68 KG/M2 | HEIGHT: 64 IN | WEIGHT: 250 LBS | HEART RATE: 88 BPM

## 2021-01-01 VITALS
HEART RATE: 92 BPM | DIASTOLIC BLOOD PRESSURE: 64 MMHG | HEIGHT: 64 IN | BODY MASS INDEX: 37.76 KG/M2 | SYSTOLIC BLOOD PRESSURE: 87 MMHG | TEMPERATURE: 96.8 F | WEIGHT: 221.2 LBS | OXYGEN SATURATION: 99 % | RESPIRATION RATE: 22 BRPM

## 2021-01-01 VITALS
HEART RATE: 82 BPM | HEIGHT: 64 IN | RESPIRATION RATE: 18 BRPM | TEMPERATURE: 97.6 F | DIASTOLIC BLOOD PRESSURE: 63 MMHG | SYSTOLIC BLOOD PRESSURE: 92 MMHG | WEIGHT: 238.1 LBS | OXYGEN SATURATION: 100 % | BODY MASS INDEX: 40.65 KG/M2

## 2021-01-01 VITALS
DIASTOLIC BLOOD PRESSURE: 74 MMHG | OXYGEN SATURATION: 100 % | BODY MASS INDEX: 45.54 KG/M2 | WEIGHT: 266.76 LBS | TEMPERATURE: 97.3 F | SYSTOLIC BLOOD PRESSURE: 103 MMHG | HEART RATE: 85 BPM | HEIGHT: 64 IN | RESPIRATION RATE: 18 BRPM

## 2021-01-01 VITALS
HEART RATE: 80 BPM | HEIGHT: 62 IN | DIASTOLIC BLOOD PRESSURE: 63 MMHG | RESPIRATION RATE: 16 BRPM | WEIGHT: 251.6 LBS | OXYGEN SATURATION: 100 % | BODY MASS INDEX: 46.3 KG/M2 | TEMPERATURE: 97.4 F | SYSTOLIC BLOOD PRESSURE: 99 MMHG

## 2021-01-01 VITALS
HEART RATE: 110 BPM | WEIGHT: 202.4 LBS | OXYGEN SATURATION: 100 % | SYSTOLIC BLOOD PRESSURE: 100 MMHG | HEIGHT: 64 IN | BODY MASS INDEX: 34.56 KG/M2 | TEMPERATURE: 97.1 F | DIASTOLIC BLOOD PRESSURE: 60 MMHG

## 2021-01-01 VITALS
TEMPERATURE: 97.4 F | OXYGEN SATURATION: 97 % | WEIGHT: 212.3 LBS | DIASTOLIC BLOOD PRESSURE: 76 MMHG | RESPIRATION RATE: 20 BRPM | HEIGHT: 64 IN | BODY MASS INDEX: 36.25 KG/M2 | SYSTOLIC BLOOD PRESSURE: 102 MMHG | HEART RATE: 79 BPM

## 2021-01-01 VITALS
SYSTOLIC BLOOD PRESSURE: 117 MMHG | TEMPERATURE: 98.4 F | RESPIRATION RATE: 18 BRPM | DIASTOLIC BLOOD PRESSURE: 85 MMHG | OXYGEN SATURATION: 99 % | BODY MASS INDEX: 43.06 KG/M2 | HEIGHT: 62 IN | WEIGHT: 234 LBS | HEART RATE: 98 BPM

## 2021-01-01 VITALS
RESPIRATION RATE: 18 BRPM | SYSTOLIC BLOOD PRESSURE: 98 MMHG | HEART RATE: 83 BPM | HEIGHT: 64 IN | OXYGEN SATURATION: 100 % | DIASTOLIC BLOOD PRESSURE: 66 MMHG | WEIGHT: 238 LBS | BODY MASS INDEX: 40.63 KG/M2 | TEMPERATURE: 97.7 F

## 2021-01-01 VITALS — OXYGEN SATURATION: 96 %

## 2021-01-01 VITALS
WEIGHT: 245 LBS | TEMPERATURE: 98.2 F | HEIGHT: 64 IN | SYSTOLIC BLOOD PRESSURE: 105 MMHG | HEART RATE: 97 BPM | RESPIRATION RATE: 22 BRPM | BODY MASS INDEX: 41.83 KG/M2 | DIASTOLIC BLOOD PRESSURE: 69 MMHG | OXYGEN SATURATION: 98 %

## 2021-01-01 VITALS
RESPIRATION RATE: 20 BRPM | SYSTOLIC BLOOD PRESSURE: 110 MMHG | BODY MASS INDEX: 41.15 KG/M2 | HEART RATE: 78 BPM | HEIGHT: 64 IN | TEMPERATURE: 96.7 F | WEIGHT: 241 LBS | OXYGEN SATURATION: 99 % | DIASTOLIC BLOOD PRESSURE: 72 MMHG

## 2021-01-01 VITALS
BODY MASS INDEX: 37.05 KG/M2 | WEIGHT: 217 LBS | DIASTOLIC BLOOD PRESSURE: 60 MMHG | HEIGHT: 64 IN | SYSTOLIC BLOOD PRESSURE: 140 MMHG

## 2021-01-01 VITALS
HEIGHT: 65 IN | TEMPERATURE: 96.9 F | WEIGHT: 239 LBS | OXYGEN SATURATION: 100 % | HEART RATE: 111 BPM | RESPIRATION RATE: 16 BRPM | DIASTOLIC BLOOD PRESSURE: 61 MMHG | SYSTOLIC BLOOD PRESSURE: 90 MMHG | BODY MASS INDEX: 39.82 KG/M2

## 2021-01-01 VITALS
HEIGHT: 65 IN | HEART RATE: 96 BPM | BODY MASS INDEX: 37.49 KG/M2 | TEMPERATURE: 97.6 F | RESPIRATION RATE: 16 BRPM | WEIGHT: 225 LBS | DIASTOLIC BLOOD PRESSURE: 78 MMHG | SYSTOLIC BLOOD PRESSURE: 96 MMHG | OXYGEN SATURATION: 96 %

## 2021-01-01 VITALS
DIASTOLIC BLOOD PRESSURE: 73 MMHG | WEIGHT: 236.1 LBS | HEART RATE: 93 BPM | OXYGEN SATURATION: 98 % | SYSTOLIC BLOOD PRESSURE: 107 MMHG | TEMPERATURE: 97.2 F | RESPIRATION RATE: 16 BRPM | HEIGHT: 64 IN | BODY MASS INDEX: 40.31 KG/M2

## 2021-01-01 VITALS — OXYGEN SATURATION: 95 %

## 2021-01-01 DIAGNOSIS — E11.621 DIABETIC FOOT ULCER WITH OSTEOMYELITIS (HCC): ICD-10-CM

## 2021-01-01 DIAGNOSIS — Z79.4 TYPE 2 DIABETES MELLITUS WITH STAGE 3B CHRONIC KIDNEY DISEASE, WITH LONG-TERM CURRENT USE OF INSULIN (HCC): ICD-10-CM

## 2021-01-01 DIAGNOSIS — Z95.1 S/P CABG (CORONARY ARTERY BYPASS GRAFT): ICD-10-CM

## 2021-01-01 DIAGNOSIS — R29.898 DEFICIENCIES OF LIMBS: ICD-10-CM

## 2021-01-01 DIAGNOSIS — N18.32 STAGE 3B CHRONIC KIDNEY DISEASE (HCC): ICD-10-CM

## 2021-01-01 DIAGNOSIS — E87.6 HYPOKALEMIA: ICD-10-CM

## 2021-01-01 DIAGNOSIS — N17.9 ACUTE RENAL FAILURE, UNSPECIFIED ACUTE RENAL FAILURE TYPE (HCC): Primary | ICD-10-CM

## 2021-01-01 DIAGNOSIS — I50.9 ACUTE ON CHRONIC CONGESTIVE HEART FAILURE, UNSPECIFIED HEART FAILURE TYPE (HCC): Primary | ICD-10-CM

## 2021-01-01 DIAGNOSIS — E78.2 MIXED HYPERLIPIDEMIA: Chronic | ICD-10-CM

## 2021-01-01 DIAGNOSIS — E13.42 DIABETIC POLYNEUROPATHY ASSOCIATED WITH OTHER SPECIFIED DIABETES MELLITUS (HCC): ICD-10-CM

## 2021-01-01 DIAGNOSIS — R57.1 HYPOVOLEMIC SHOCK (HCC): ICD-10-CM

## 2021-01-01 DIAGNOSIS — Z79.4 TYPE 2 DIABETES MELLITUS WITH DIABETIC POLYNEUROPATHY, WITH LONG-TERM CURRENT USE OF INSULIN (HCC): Primary | ICD-10-CM

## 2021-01-01 DIAGNOSIS — E11.42 TYPE 2 DIABETES MELLITUS WITH DIABETIC POLYNEUROPATHY, WITH LONG-TERM CURRENT USE OF INSULIN (HCC): Primary | ICD-10-CM

## 2021-01-01 DIAGNOSIS — A41.9 SEVERE SEPSIS (HCC): Primary | ICD-10-CM

## 2021-01-01 DIAGNOSIS — R05.3 CHRONIC COUGH: ICD-10-CM

## 2021-01-01 DIAGNOSIS — E11.69 DIABETES MELLITUS TYPE 2 IN OBESE (HCC): ICD-10-CM

## 2021-01-01 DIAGNOSIS — L97.401 DIABETIC ULCER OF HEEL ASSOCIATED WITH DIABETES MELLITUS DUE TO UNDERLYING CONDITION, LIMITED TO BREAKDOWN OF SKIN, UNSPECIFIED LATERALITY (HCC): ICD-10-CM

## 2021-01-01 DIAGNOSIS — J42 CHRONIC BRONCHITIS, UNSPECIFIED CHRONIC BRONCHITIS TYPE (HCC): Primary | ICD-10-CM

## 2021-01-01 DIAGNOSIS — E66.9 DIABETES MELLITUS TYPE 2 IN OBESE (HCC): ICD-10-CM

## 2021-01-01 DIAGNOSIS — N18.4 CKD (CHRONIC KIDNEY DISEASE) STAGE 4, GFR 15-29 ML/MIN (HCC): ICD-10-CM

## 2021-01-01 DIAGNOSIS — I25.5 ISCHEMIC CARDIOMYOPATHY: ICD-10-CM

## 2021-01-01 DIAGNOSIS — I50.22 CHRONIC SYSTOLIC HEART FAILURE (HCC): ICD-10-CM

## 2021-01-01 DIAGNOSIS — M79.662 BILATERAL CALF PAIN: ICD-10-CM

## 2021-01-01 DIAGNOSIS — M79.672 LEFT FOOT PAIN: ICD-10-CM
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
## 2021-01-01 DIAGNOSIS — E08.621 DIABETIC ULCER OF HEEL ASSOCIATED WITH DIABETES MELLITUS DUE TO UNDERLYING CONDITION, LIMITED TO BREAKDOWN OF SKIN, UNSPECIFIED LATERALITY (HCC): ICD-10-CM

## 2021-01-01 DIAGNOSIS — I73.9 PVD (PERIPHERAL VASCULAR DISEASE) WITH CLAUDICATION (HCC): ICD-10-CM

## 2021-01-01 DIAGNOSIS — I73.9 PERIPHERAL VASCULAR DISEASE, UNSPECIFIED (HCC): Primary | ICD-10-CM

## 2021-01-01 DIAGNOSIS — L97.911 ULCERS OF BOTH LOWER LEGS, LIMITED TO BREAKDOWN OF SKIN (HCC): ICD-10-CM

## 2021-01-01 DIAGNOSIS — L03.116 CELLULITIS OF LEFT LOWER EXTREMITY: Primary | ICD-10-CM

## 2021-01-01 DIAGNOSIS — K85.90 ACUTE PANCREATITIS WITHOUT INFECTION OR NECROSIS, UNSPECIFIED PANCREATITIS TYPE: Primary | ICD-10-CM

## 2021-01-01 DIAGNOSIS — J18.9 PNEUMONIA DUE TO INFECTIOUS ORGANISM, UNSPECIFIED LATERALITY, UNSPECIFIED PART OF LUNG: ICD-10-CM

## 2021-01-01 DIAGNOSIS — I25.119 CORONARY ARTERY DISEASE INVOLVING NATIVE CORONARY ARTERY OF NATIVE HEART WITH ANGINA PECTORIS (HCC): ICD-10-CM

## 2021-01-01 DIAGNOSIS — R60.0 LOWER EXTREMITY EDEMA: ICD-10-CM

## 2021-01-01 DIAGNOSIS — R91.8 PULMONARY NODULES: ICD-10-CM

## 2021-01-01 DIAGNOSIS — M24.872 HYPERMOBILE JOINT SYNDROME OF LEFT ANKLE: ICD-10-CM

## 2021-01-01 DIAGNOSIS — E78.2 MIXED HYPERLIPIDEMIA: ICD-10-CM

## 2021-01-01 DIAGNOSIS — K66.8 CYSTIC LESION OF ABDOMINAL VISCERA: ICD-10-CM

## 2021-01-01 DIAGNOSIS — N18.32 TYPE 2 DIABETES MELLITUS WITH STAGE 3B CHRONIC KIDNEY DISEASE, WITH LONG-TERM CURRENT USE OF INSULIN (HCC): ICD-10-CM

## 2021-01-01 DIAGNOSIS — E11.22 TYPE 2 DIABETES MELLITUS WITH STAGE 3B CHRONIC KIDNEY DISEASE, WITH LONG-TERM CURRENT USE OF INSULIN (HCC): ICD-10-CM

## 2021-01-01 DIAGNOSIS — L97.921 ULCERS OF BOTH LOWER LEGS, LIMITED TO BREAKDOWN OF SKIN (HCC): ICD-10-CM

## 2021-01-01 DIAGNOSIS — Z53.29 LEFT AGAINST MEDICAL ADVICE: ICD-10-CM

## 2021-01-01 DIAGNOSIS — L97.921 ULCER OF LEFT LOWER LEG, LIMITED TO BREAKDOWN OF SKIN (HCC): Primary | ICD-10-CM

## 2021-01-01 DIAGNOSIS — E11.69 DIABETIC FOOT ULCER WITH OSTEOMYELITIS (HCC): ICD-10-CM

## 2021-01-01 DIAGNOSIS — M79.662 PAIN IN LEFT SHIN: ICD-10-CM

## 2021-01-01 DIAGNOSIS — N18.9 CHRONIC RENAL IMPAIRMENT, UNSPECIFIED CKD STAGE: ICD-10-CM

## 2021-01-01 DIAGNOSIS — R77.8 ELEVATED TROPONIN: ICD-10-CM

## 2021-01-01 DIAGNOSIS — E87.1 HYPONATREMIA: ICD-10-CM

## 2021-01-01 DIAGNOSIS — I46.9 CARDIAC ARREST (HCC): Primary | ICD-10-CM

## 2021-01-01 DIAGNOSIS — Z87.891 PERSONAL HISTORY OF TOBACCO USE: ICD-10-CM

## 2021-01-01 DIAGNOSIS — I87.2 PERIPHERAL VENOUS INSUFFICIENCY: Primary | ICD-10-CM

## 2021-01-01 DIAGNOSIS — I34.0 MITRAL VALVE INSUFFICIENCY, UNSPECIFIED ETIOLOGY: ICD-10-CM

## 2021-01-01 DIAGNOSIS — Z87.891 PERSONAL HISTORY OF SMOKING: ICD-10-CM

## 2021-01-01 DIAGNOSIS — R53.1 GENERALIZED WEAKNESS: Primary | ICD-10-CM

## 2021-01-01 DIAGNOSIS — E78.5 DYSLIPIDEMIA: ICD-10-CM

## 2021-01-01 DIAGNOSIS — M24.572 ANKLE CONTRACTURE, LEFT: ICD-10-CM

## 2021-01-01 DIAGNOSIS — M79.661 BILATERAL CALF PAIN: ICD-10-CM

## 2021-01-01 DIAGNOSIS — E87.1 ACUTE HYPONATREMIA: ICD-10-CM

## 2021-01-01 DIAGNOSIS — R91.8 LUNG INFILTRATE: ICD-10-CM

## 2021-01-01 DIAGNOSIS — L03.115 BILATERAL LOWER LEG CELLULITIS: ICD-10-CM

## 2021-01-01 DIAGNOSIS — G47.33 OSA (OBSTRUCTIVE SLEEP APNEA): ICD-10-CM

## 2021-01-01 DIAGNOSIS — R73.9 HYPERGLYCEMIA: Primary | ICD-10-CM

## 2021-01-01 DIAGNOSIS — I10 ESSENTIAL HYPERTENSION: ICD-10-CM

## 2021-01-01 DIAGNOSIS — I87.2 PERIPHERAL VENOUS INSUFFICIENCY: ICD-10-CM

## 2021-01-01 DIAGNOSIS — R91.1 PULMONARY NODULE: Primary | ICD-10-CM

## 2021-01-01 DIAGNOSIS — I25.119 CORONARY ARTERY DISEASE INVOLVING NATIVE CORONARY ARTERY OF NATIVE HEART WITH ANGINA PECTORIS (HCC): Primary | ICD-10-CM

## 2021-01-01 DIAGNOSIS — R91.1 PULMONARY NODULE: ICD-10-CM

## 2021-01-01 DIAGNOSIS — M10.00 ACUTE IDIOPATHIC GOUT, UNSPECIFIED SITE: Primary | ICD-10-CM

## 2021-01-01 DIAGNOSIS — R52 PAIN: ICD-10-CM

## 2021-01-01 DIAGNOSIS — I50.23 ACUTE ON CHRONIC SYSTOLIC CONGESTIVE HEART FAILURE (HCC): ICD-10-CM

## 2021-01-01 DIAGNOSIS — I73.9 PERIPHERAL VASCULAR DISEASE, UNSPECIFIED (HCC): ICD-10-CM

## 2021-01-01 DIAGNOSIS — M86.9 DIABETIC FOOT ULCER WITH OSTEOMYELITIS (HCC): ICD-10-CM

## 2021-01-01 DIAGNOSIS — L97.509 DIABETIC FOOT ULCER WITH OSTEOMYELITIS (HCC): ICD-10-CM

## 2021-01-01 DIAGNOSIS — E66.01 CLASS 2 SEVERE OBESITY DUE TO EXCESS CALORIES WITH SERIOUS COMORBIDITY AND BODY MASS INDEX (BMI) OF 39.0 TO 39.9 IN ADULT (HCC): ICD-10-CM

## 2021-01-01 DIAGNOSIS — N17.9 AKI (ACUTE KIDNEY INJURY) (HCC): ICD-10-CM

## 2021-01-01 DIAGNOSIS — I89.0 LYMPHEDEMA OF BOTH LOWER EXTREMITIES: ICD-10-CM

## 2021-01-01 DIAGNOSIS — L97.911 NON-PRESSURE CHRONIC ULCER RIGHT LOWER LEG, LIMITED TO BREAKDOWN SKIN (HCC): ICD-10-CM

## 2021-01-01 DIAGNOSIS — I50.43 CHF (CONGESTIVE HEART FAILURE), NYHA CLASS I, ACUTE ON CHRONIC, COMBINED (HCC): Primary | ICD-10-CM

## 2021-01-01 DIAGNOSIS — E16.2 HYPOGLYCEMIA: ICD-10-CM

## 2021-01-01 DIAGNOSIS — E87.5 ACUTE HYPERKALEMIA: ICD-10-CM

## 2021-01-01 DIAGNOSIS — L03.116 BILATERAL LOWER LEG CELLULITIS: ICD-10-CM

## 2021-01-01 DIAGNOSIS — E87.70 HYPERVOLEMIA, UNSPECIFIED HYPERVOLEMIA TYPE: ICD-10-CM

## 2021-01-01 DIAGNOSIS — M25.511 RIGHT SHOULDER PAIN, UNSPECIFIED CHRONICITY: ICD-10-CM

## 2021-01-01 DIAGNOSIS — Z72.0 TOBACCO ABUSE: ICD-10-CM

## 2021-01-01 DIAGNOSIS — L03.115 CELLULITIS OF RIGHT FOOT: ICD-10-CM

## 2021-01-01 DIAGNOSIS — G93.40 ACUTE ENCEPHALOPATHY: ICD-10-CM

## 2021-01-01 DIAGNOSIS — R07.9 CHEST PAIN, UNSPECIFIED TYPE: Primary | ICD-10-CM

## 2021-01-01 DIAGNOSIS — S92.255A CLOSED NONDISPLACED FRACTURE OF NAVICULAR BONE OF LEFT FOOT, INITIAL ENCOUNTER: ICD-10-CM

## 2021-01-01 DIAGNOSIS — I73.9 PAD (PERIPHERAL ARTERY DISEASE) (HCC): ICD-10-CM

## 2021-01-01 DIAGNOSIS — R65.20 SEVERE SEPSIS (HCC): Primary | ICD-10-CM

## 2021-01-01 DIAGNOSIS — G47.33 MODERATE OBSTRUCTIVE SLEEP APNEA: ICD-10-CM

## 2021-01-01 LAB
A/G RATIO: 0.9 (ref 1.1–2.2)
A/G RATIO: 1 (ref 1.1–2.2)
A/G RATIO: 1.1 (ref 1.1–2.2)
A/G RATIO: 1.2 (ref 1.1–2.2)
A/G RATIO: 1.3 (ref 1.1–2.2)
ABO/RH: NORMAL
ALBUMIN SERPL-MCNC: 2.9 G/DL (ref 3.1–4.9)
ALBUMIN SERPL-MCNC: 2.9 G/DL (ref 3.4–5)
ALBUMIN SERPL-MCNC: 3.1 G/DL (ref 3.4–5)
ALBUMIN SERPL-MCNC: 3.2 G/DL (ref 3.4–5)
ALBUMIN SERPL-MCNC: 3.3 G/DL (ref 3.4–5)
ALBUMIN SERPL-MCNC: 3.3 G/DL (ref 3.4–5)
ALBUMIN SERPL-MCNC: 3.4 G/DL (ref 3.4–5)
ALBUMIN SERPL-MCNC: 3.5 G/DL (ref 3.4–5)
ALBUMIN SERPL-MCNC: 3.6 G/DL (ref 3.4–5)
ALBUMIN SERPL-MCNC: 3.7 G/DL (ref 3.4–5)
ALBUMIN SERPL-MCNC: 3.8 G/DL (ref 3.4–5)
ALBUMIN SERPL-MCNC: 3.9 G/DL (ref 3.4–5)
ALBUMIN SERPL-MCNC: 4 G/DL (ref 3.4–5)
ALBUMIN SERPL-MCNC: 4.1 G/DL (ref 3.4–5)
ALBUMIN SERPL-MCNC: 4.1 G/DL (ref 3.4–5)
ALBUMIN SERPL-MCNC: 4.2 G/DL (ref 3.4–5)
ALBUMIN SERPL-MCNC: 4.2 G/DL (ref 3.4–5)
ALBUMIN SERPL-MCNC: 4.5 G/DL (ref 3.4–5)
ALP BLD-CCNC: 160 U/L (ref 40–129)
ALP BLD-CCNC: 166 U/L (ref 40–129)
ALP BLD-CCNC: 176 U/L (ref 40–129)
ALP BLD-CCNC: 191 U/L (ref 40–129)
ALP BLD-CCNC: 205 U/L (ref 40–129)
ALP BLD-CCNC: 224 U/L (ref 40–129)
ALP BLD-CCNC: 225 U/L (ref 40–129)
ALP BLD-CCNC: 266 U/L (ref 40–129)
ALP BLD-CCNC: 267 U/L (ref 40–129)
ALP BLD-CCNC: 277 U/L (ref 40–129)
ALP BLD-CCNC: 281 U/L (ref 40–129)
ALP BLD-CCNC: 285 U/L (ref 40–129)
ALP BLD-CCNC: 296 U/L (ref 40–129)
ALP BLD-CCNC: 305 U/L (ref 40–129)
ALP BLD-CCNC: 309 U/L (ref 40–129)
ALP BLD-CCNC: 332 U/L (ref 40–129)
ALP BLD-CCNC: 340 U/L (ref 40–129)
ALP BLD-CCNC: 341 U/L (ref 40–129)
ALP BLD-CCNC: 347 U/L (ref 40–129)
ALP BLD-CCNC: 354 U/L (ref 40–129)
ALP BLD-CCNC: 363 U/L (ref 40–129)
ALP BLD-CCNC: 364 U/L (ref 40–129)
ALP BLD-CCNC: 384 U/L (ref 40–129)
ALP BLD-CCNC: 385 U/L (ref 40–129)
ALP BLD-CCNC: 393 U/L (ref 40–129)
ALPHA-1-GLOBULIN: 0.4 G/DL (ref 0.2–0.4)
ALPHA-2-GLOBULIN: 1 G/DL (ref 0.4–1.1)
ALT SERPL-CCNC: 10 U/L (ref 10–40)
ALT SERPL-CCNC: 11 U/L (ref 10–40)
ALT SERPL-CCNC: 112 U/L (ref 10–40)
ALT SERPL-CCNC: 12 U/L (ref 10–40)
ALT SERPL-CCNC: 13 U/L (ref 10–40)
ALT SERPL-CCNC: 13 U/L (ref 10–40)
ALT SERPL-CCNC: 130 U/L (ref 10–40)
ALT SERPL-CCNC: 14 U/L (ref 10–40)
ALT SERPL-CCNC: 146 U/L (ref 10–40)
ALT SERPL-CCNC: 16 U/L (ref 10–40)
ALT SERPL-CCNC: 169 U/L (ref 10–40)
ALT SERPL-CCNC: 236 U/L (ref 10–40)
ALT SERPL-CCNC: 241 U/L (ref 10–40)
ALT SERPL-CCNC: 25 U/L (ref 10–40)
ALT SERPL-CCNC: 29 U/L (ref 10–40)
ALT SERPL-CCNC: 29 U/L (ref 10–40)
ALT SERPL-CCNC: 299 U/L (ref 10–40)
ALT SERPL-CCNC: 33 U/L (ref 10–40)
ALT SERPL-CCNC: 71 U/L (ref 10–40)
ALT SERPL-CCNC: 8 U/L (ref 10–40)
ALT SERPL-CCNC: 86 U/L (ref 10–40)
AMMONIA: 26 UMOL/L (ref 11–51)
AMPHETAMINE SCREEN, URINE: NORMAL
AMPHETAMINE SCREEN, URINE: NORMAL
ANION GAP SERPL CALCULATED.3IONS-SCNC: 10 MMOL/L (ref 3–16)
ANION GAP SERPL CALCULATED.3IONS-SCNC: 11 MMOL/L (ref 3–16)
ANION GAP SERPL CALCULATED.3IONS-SCNC: 12 MMOL/L (ref 3–16)
ANION GAP SERPL CALCULATED.3IONS-SCNC: 13 MMOL/L (ref 3–16)
ANION GAP SERPL CALCULATED.3IONS-SCNC: 14 MMOL/L (ref 3–16)
ANION GAP SERPL CALCULATED.3IONS-SCNC: 15 MMOL/L (ref 3–16)
ANION GAP SERPL CALCULATED.3IONS-SCNC: 16 MMOL/L (ref 3–16)
ANION GAP SERPL CALCULATED.3IONS-SCNC: 17 MMOL/L (ref 3–16)
ANION GAP SERPL CALCULATED.3IONS-SCNC: 18 MMOL/L (ref 3–16)
ANION GAP SERPL CALCULATED.3IONS-SCNC: 20 MMOL/L (ref 3–16)
ANION GAP SERPL CALCULATED.3IONS-SCNC: 21 MMOL/L (ref 3–16)
ANION GAP SERPL CALCULATED.3IONS-SCNC: 9 MMOL/L (ref 3–16)
ANISOCYTOSIS: ABNORMAL
ANISOCYTOSIS: ABNORMAL
ANTI-XA UNFRAC HEPARIN: 1.17 IU/ML (ref 0.3–0.7)
ANTIBODY SCREEN: NORMAL
APTT: 29.4 SEC (ref 24.2–36.2)
APTT: 35.2 SEC (ref 26.2–38.6)
APTT: 36.4 SEC (ref 26.2–38.6)
AST SERPL-CCNC: 10 U/L (ref 15–37)
AST SERPL-CCNC: 10 U/L (ref 15–37)
AST SERPL-CCNC: 12 U/L (ref 15–37)
AST SERPL-CCNC: 14 U/L (ref 15–37)
AST SERPL-CCNC: 14 U/L (ref 15–37)
AST SERPL-CCNC: 15 U/L (ref 15–37)
AST SERPL-CCNC: 15 U/L (ref 15–37)
AST SERPL-CCNC: 17 U/L (ref 15–37)
AST SERPL-CCNC: 171 U/L (ref 15–37)
AST SERPL-CCNC: 18 U/L (ref 15–37)
AST SERPL-CCNC: 19 U/L (ref 15–37)
AST SERPL-CCNC: 209 U/L (ref 15–37)
AST SERPL-CCNC: 23 U/L (ref 15–37)
AST SERPL-CCNC: 24 U/L (ref 15–37)
AST SERPL-CCNC: 26 U/L (ref 15–37)
AST SERPL-CCNC: 292 U/L (ref 15–37)
AST SERPL-CCNC: 30 U/L (ref 15–37)
AST SERPL-CCNC: 31 U/L (ref 15–37)
AST SERPL-CCNC: 33 U/L (ref 15–37)
AST SERPL-CCNC: 33 U/L (ref 15–37)
AST SERPL-CCNC: 40 U/L (ref 15–37)
AST SERPL-CCNC: 53 U/L (ref 15–37)
AST SERPL-CCNC: 566 U/L (ref 15–37)
AST SERPL-CCNC: 63 U/L (ref 15–37)
AST SERPL-CCNC: 90 U/L (ref 15–37)
ATYPICAL LYMPHOCYTE RELATIVE PERCENT: 1 % (ref 0–6)
B-TYPE NATRIURETIC PEPTIDE: 786.3 PG/ML
BACTERIA: ABNORMAL /HPF
BACTERIA: ABNORMAL /HPF
BANDED NEUTROPHILS RELATIVE PERCENT: 2 % (ref 0–7)
BARBITURATE SCREEN URINE: NORMAL
BARBITURATE SCREEN URINE: NORMAL
BASE EXCESS ARTERIAL: -5.4 MMOL/L (ref -3–3)
BASE EXCESS ARTERIAL: -8 MMOL/L (ref -3–3)
BASE EXCESS ARTERIAL: 11.3 MMOL/L (ref -3–3)
BASE EXCESS ARTERIAL: 11.6 MMOL/L (ref -3–3)
BASE EXCESS ARTERIAL: 17.3 MMOL/L (ref -3–3)
BASE EXCESS VENOUS: -7.9 MMOL/L (ref -3–3)
BASE EXCESS VENOUS: 0.8 MMOL/L (ref -3–3)
BASE EXCESS VENOUS: 10.8 MMOL/L (ref -3–3)
BASE EXCESS VENOUS: 7.1 MMOL/L (ref -3–3)
BASE EXCESS VENOUS: 9.7 MMOL/L (ref -3–3)
BASOPHILS ABSOLUTE: 0 K/UL (ref 0–0.2)
BASOPHILS ABSOLUTE: 0.1 K/UL (ref 0–0.2)
BASOPHILS RELATIVE PERCENT: 0 %
BASOPHILS RELATIVE PERCENT: 0.3 %
BASOPHILS RELATIVE PERCENT: 0.4 %
BASOPHILS RELATIVE PERCENT: 0.5 %
BASOPHILS RELATIVE PERCENT: 0.6 %
BASOPHILS RELATIVE PERCENT: 0.7 %
BASOPHILS RELATIVE PERCENT: 0.8 %
BASOPHILS RELATIVE PERCENT: 0.8 %
BASOPHILS RELATIVE PERCENT: 0.9 %
BASOPHILS RELATIVE PERCENT: 1 %
BASOPHILS RELATIVE PERCENT: 1 %
BASOPHILS RELATIVE PERCENT: 1.1 %
BASOPHILS RELATIVE PERCENT: 1.2 %
BASOPHILS RELATIVE PERCENT: 1.3 %
BASOPHILS RELATIVE PERCENT: 2 %
BENZODIAZEPINE SCREEN, URINE: NORMAL
BENZODIAZEPINE SCREEN, URINE: NORMAL
BETA GLOBULIN: 1.3 G/DL (ref 0.9–1.6)
BILIRUB SERPL-MCNC: 0.6 MG/DL (ref 0–1)
BILIRUB SERPL-MCNC: 0.8 MG/DL (ref 0–1)
BILIRUB SERPL-MCNC: 1.2 MG/DL (ref 0–1)
BILIRUB SERPL-MCNC: 1.3 MG/DL (ref 0–1)
BILIRUB SERPL-MCNC: 1.4 MG/DL (ref 0–1)
BILIRUB SERPL-MCNC: 1.5 MG/DL (ref 0–1)
BILIRUB SERPL-MCNC: 1.6 MG/DL (ref 0–1)
BILIRUB SERPL-MCNC: 1.6 MG/DL (ref 0–1)
BILIRUB SERPL-MCNC: 1.7 MG/DL (ref 0–1)
BILIRUB SERPL-MCNC: 1.7 MG/DL (ref 0–1)
BILIRUB SERPL-MCNC: 1.8 MG/DL (ref 0–1)
BILIRUB SERPL-MCNC: 1.9 MG/DL (ref 0–1)
BILIRUB SERPL-MCNC: 1.9 MG/DL (ref 0–1)
BILIRUB SERPL-MCNC: 2 MG/DL (ref 0–1)
BILIRUB SERPL-MCNC: 2.2 MG/DL (ref 0–1)
BILIRUB SERPL-MCNC: 2.3 MG/DL (ref 0–1)
BILIRUB SERPL-MCNC: 3 MG/DL (ref 0–1)
BILIRUB SERPL-MCNC: 3.3 MG/DL (ref 0–1)
BILIRUBIN DIRECT: 0.7 MG/DL (ref 0–0.3)
BILIRUBIN URINE: ABNORMAL
BILIRUBIN URINE: NEGATIVE
BILIRUBIN, INDIRECT: 0.5 MG/DL (ref 0–1)
BLOOD CULTURE, ROUTINE: NORMAL
BLOOD, URINE: ABNORMAL
BLOOD, URINE: ABNORMAL
BLOOD, URINE: NEGATIVE
BUN BLDV-MCNC: 24 MG/DL (ref 7–20)
BUN BLDV-MCNC: 25 MG/DL (ref 7–20)
BUN BLDV-MCNC: 26 MG/DL (ref 7–20)
BUN BLDV-MCNC: 28 MG/DL (ref 7–20)
BUN BLDV-MCNC: 31 MG/DL
BUN BLDV-MCNC: 33 MG/DL (ref 7–20)
BUN BLDV-MCNC: 33 MG/DL (ref 7–20)
BUN BLDV-MCNC: 37 MG/DL (ref 7–20)
BUN BLDV-MCNC: 40 MG/DL (ref 7–20)
BUN BLDV-MCNC: 40 MG/DL (ref 7–20)
BUN BLDV-MCNC: 43 MG/DL (ref 7–20)
BUN BLDV-MCNC: 46 MG/DL (ref 7–20)
BUN BLDV-MCNC: 47 MG/DL (ref 7–20)
BUN BLDV-MCNC: 48 MG/DL (ref 7–20)
BUN BLDV-MCNC: 49 MG/DL (ref 7–20)
BUN BLDV-MCNC: 49 MG/DL (ref 7–20)
BUN BLDV-MCNC: 50 MG/DL (ref 7–20)
BUN BLDV-MCNC: 52 MG/DL (ref 7–20)
BUN BLDV-MCNC: 54 MG/DL (ref 7–20)
BUN BLDV-MCNC: 55 MG/DL (ref 7–20)
BUN BLDV-MCNC: 57 MG/DL (ref 7–20)
BUN BLDV-MCNC: 58 MG/DL (ref 7–20)
BUN BLDV-MCNC: 59 MG/DL (ref 7–20)
BUN BLDV-MCNC: 59 MG/DL (ref 7–20)
BUN BLDV-MCNC: 62 MG/DL (ref 7–20)
BUN BLDV-MCNC: 63 MG/DL (ref 7–20)
BUN BLDV-MCNC: 63 MG/DL (ref 7–20)
BUN BLDV-MCNC: 64 MG/DL (ref 7–20)
BUN BLDV-MCNC: 64 MG/DL (ref 7–20)
BUN BLDV-MCNC: 65 MG/DL (ref 7–20)
BUN BLDV-MCNC: 67 MG/DL (ref 7–20)
BUN BLDV-MCNC: 68 MG/DL (ref 7–20)
BUN BLDV-MCNC: 69 MG/DL (ref 7–20)
BUN BLDV-MCNC: 70 MG/DL (ref 7–20)
BUN BLDV-MCNC: 72 MG/DL (ref 7–20)
BUN BLDV-MCNC: 73 MG/DL (ref 7–20)
BUN BLDV-MCNC: 74 MG/DL (ref 7–20)
BUN BLDV-MCNC: 74 MG/DL (ref 7–20)
BUN BLDV-MCNC: 75 MG/DL (ref 7–20)
BUN BLDV-MCNC: 77 MG/DL (ref 7–20)
BUN BLDV-MCNC: 77 MG/DL (ref 7–20)
BUN BLDV-MCNC: 78 MG/DL (ref 7–20)
BUN BLDV-MCNC: 79 MG/DL (ref 7–20)
BUN BLDV-MCNC: 79 MG/DL (ref 7–20)
BUN BLDV-MCNC: 80 MG/DL (ref 7–20)
BUN BLDV-MCNC: 81 MG/DL (ref 7–20)
BUN BLDV-MCNC: 82 MG/DL (ref 7–20)
BUN BLDV-MCNC: 83 MG/DL (ref 7–20)
BUN BLDV-MCNC: 84 MG/DL (ref 7–20)
BUN BLDV-MCNC: 85 MG/DL (ref 7–20)
BUN BLDV-MCNC: 85 MG/DL (ref 7–20)
BUN BLDV-MCNC: 86 MG/DL (ref 7–20)
BUN BLDV-MCNC: 87 MG/DL (ref 7–20)
BUN BLDV-MCNC: 87 MG/DL (ref 7–20)
BUN BLDV-MCNC: 88 MG/DL (ref 7–20)
BUN BLDV-MCNC: 89 MG/DL (ref 7–20)
BUN BLDV-MCNC: 89 MG/DL (ref 7–20)
BUN BLDV-MCNC: 93 MG/DL (ref 7–20)
BUN BLDV-MCNC: 95 MG/DL (ref 7–20)
C-REACTIVE PROTEIN: 42.4 MG/L (ref 0–5.1)
C-REACTIVE PROTEIN: 48.8 MG/L (ref 0–5.1)
C-REACTIVE PROTEIN: 56.5 MG/L (ref 0–5.1)
CALCIUM SERPL-MCNC: 10 MG/DL (ref 8.3–10.6)
CALCIUM SERPL-MCNC: 10 MG/DL (ref 8.3–10.6)
CALCIUM SERPL-MCNC: 10.1 MG/DL (ref 8.3–10.6)
CALCIUM SERPL-MCNC: 11.2 MG/DL (ref 8.3–10.6)
CALCIUM SERPL-MCNC: 7.3 MG/DL (ref 8.3–10.6)
CALCIUM SERPL-MCNC: 7.9 MG/DL (ref 8.3–10.6)
CALCIUM SERPL-MCNC: 8.2 MG/DL (ref 8.3–10.6)
CALCIUM SERPL-MCNC: 8.3 MG/DL
CALCIUM SERPL-MCNC: 8.4 MG/DL (ref 8.3–10.6)
CALCIUM SERPL-MCNC: 8.5 MG/DL (ref 8.3–10.6)
CALCIUM SERPL-MCNC: 8.6 MG/DL (ref 8.3–10.6)
CALCIUM SERPL-MCNC: 8.6 MG/DL (ref 8.3–10.6)
CALCIUM SERPL-MCNC: 8.8 MG/DL (ref 8.3–10.6)
CALCIUM SERPL-MCNC: 8.8 MG/DL (ref 8.3–10.6)
CALCIUM SERPL-MCNC: 8.9 MG/DL (ref 8.3–10.6)
CALCIUM SERPL-MCNC: 9 MG/DL (ref 8.3–10.6)
CALCIUM SERPL-MCNC: 9.1 MG/DL (ref 8.3–10.6)
CALCIUM SERPL-MCNC: 9.2 MG/DL (ref 8.3–10.6)
CALCIUM SERPL-MCNC: 9.3 MG/DL (ref 8.3–10.6)
CALCIUM SERPL-MCNC: 9.4 MG/DL (ref 8.3–10.6)
CALCIUM SERPL-MCNC: 9.5 MG/DL (ref 8.3–10.6)
CALCIUM SERPL-MCNC: 9.6 MG/DL (ref 8.3–10.6)
CALCIUM SERPL-MCNC: 9.7 MG/DL (ref 8.3–10.6)
CALCIUM SERPL-MCNC: 9.8 MG/DL (ref 8.3–10.6)
CALCIUM SERPL-MCNC: 9.9 MG/DL (ref 8.3–10.6)
CANNABINOID SCREEN URINE: NORMAL
CANNABINOID SCREEN URINE: NORMAL
CARBOXYHEMOGLOBIN ARTERIAL: 0.4 % (ref 0–1.5)
CARBOXYHEMOGLOBIN ARTERIAL: 0.9 % (ref 0–1.5)
CARBOXYHEMOGLOBIN ARTERIAL: 1 % (ref 0–1.5)
CARBOXYHEMOGLOBIN ARTERIAL: 1.1 % (ref 0–1.5)
CARBOXYHEMOGLOBIN ARTERIAL: 2.1 % (ref 0–1.5)
CARBOXYHEMOGLOBIN: 2.9 % (ref 0–1.5)
CARBOXYHEMOGLOBIN: 3.4 % (ref 0–1.5)
CARBOXYHEMOGLOBIN: 3.5 % (ref 0–1.5)
CARBOXYHEMOGLOBIN: 4 % (ref 0–1.5)
CARBOXYHEMOGLOBIN: 4.7 % (ref 0–1.5)
CHLORIDE BLD-SCNC: 100 MMOL/L (ref 99–110)
CHLORIDE BLD-SCNC: 100 MMOL/L (ref 99–110)
CHLORIDE BLD-SCNC: 101 MMOL/L (ref 99–110)
CHLORIDE BLD-SCNC: 68 MMOL/L (ref 99–110)
CHLORIDE BLD-SCNC: 72 MMOL/L (ref 99–110)
CHLORIDE BLD-SCNC: 73 MMOL/L (ref 99–110)
CHLORIDE BLD-SCNC: 74 MMOL/L (ref 99–110)
CHLORIDE BLD-SCNC: 76 MMOL/L (ref 99–110)
CHLORIDE BLD-SCNC: 77 MMOL/L (ref 99–110)
CHLORIDE BLD-SCNC: 77 MMOL/L (ref 99–110)
CHLORIDE BLD-SCNC: 78 MMOL/L (ref 99–110)
CHLORIDE BLD-SCNC: 79 MMOL/L (ref 99–110)
CHLORIDE BLD-SCNC: 79 MMOL/L (ref 99–110)
CHLORIDE BLD-SCNC: 80 MMOL/L (ref 99–110)
CHLORIDE BLD-SCNC: 81 MMOL/L (ref 99–110)
CHLORIDE BLD-SCNC: 81 MMOL/L (ref 99–110)
CHLORIDE BLD-SCNC: 82 MMOL/L (ref 99–110)
CHLORIDE BLD-SCNC: 83 MMOL/L (ref 99–110)
CHLORIDE BLD-SCNC: 84 MMOL/L (ref 99–110)
CHLORIDE BLD-SCNC: 85 MMOL/L (ref 99–110)
CHLORIDE BLD-SCNC: 86 MMOL/L (ref 99–110)
CHLORIDE BLD-SCNC: 87 MMOL/L (ref 99–110)
CHLORIDE BLD-SCNC: 88 MMOL/L (ref 99–110)
CHLORIDE BLD-SCNC: 88 MMOL/L (ref 99–110)
CHLORIDE BLD-SCNC: 89 MMOL/L (ref 99–110)
CHLORIDE BLD-SCNC: 90 MMOL/L (ref 99–110)
CHLORIDE BLD-SCNC: 91 MMOL/L (ref 99–110)
CHLORIDE BLD-SCNC: 92 MMOL/L (ref 99–110)
CHLORIDE BLD-SCNC: 93 MMOL/L (ref 99–110)
CHLORIDE BLD-SCNC: 94 MMOL/L (ref 99–110)
CHLORIDE BLD-SCNC: 94 MMOL/L (ref 99–110)
CHLORIDE BLD-SCNC: 95 MMOL/L (ref 99–110)
CHLORIDE BLD-SCNC: 96 MMOL/L (ref 99–110)
CHLORIDE BLD-SCNC: 98 MMOL/L
CHLORIDE BLD-SCNC: 98 MMOL/L (ref 99–110)
CHLORIDE URINE RANDOM: 35 MMOL/L
CHLORIDE URINE RANDOM: <20 MMOL/L
CHOLESTEROL, TOTAL: 134 MG/DL (ref 0–199)
CHOLESTEROL, TOTAL: 80 MG/DL (ref 0–199)
CLARITY: ABNORMAL
CLARITY: CLEAR
CO2: 12 MMOL/L (ref 21–32)
CO2: 13 MMOL/L (ref 21–32)
CO2: 15 MMOL/L (ref 21–32)
CO2: 16 MMOL/L (ref 21–32)
CO2: 17 MMOL/L (ref 21–32)
CO2: 17 MMOL/L (ref 21–32)
CO2: 18 MMOL/L (ref 21–32)
CO2: 19 MMOL/L
CO2: 19 MMOL/L (ref 21–32)
CO2: 19 MMOL/L (ref 21–32)
CO2: 20 MMOL/L (ref 21–32)
CO2: 20 MMOL/L (ref 21–32)
CO2: 21 MMOL/L (ref 21–32)
CO2: 23 MMOL/L (ref 21–32)
CO2: 24 MMOL/L (ref 21–32)
CO2: 25 MMOL/L (ref 21–32)
CO2: 26 MMOL/L (ref 21–32)
CO2: 27 MMOL/L (ref 21–32)
CO2: 28 MMOL/L (ref 21–32)
CO2: 29 MMOL/L (ref 21–32)
CO2: 30 MMOL/L (ref 21–32)
CO2: 30 MMOL/L (ref 21–32)
CO2: 31 MMOL/L (ref 21–32)
CO2: 31 MMOL/L (ref 21–32)
CO2: 32 MMOL/L (ref 21–32)
CO2: 32 MMOL/L (ref 21–32)
CO2: 33 MMOL/L (ref 21–32)
CO2: 34 MMOL/L (ref 21–32)
CO2: 35 MMOL/L (ref 21–32)
CO2: 35 MMOL/L (ref 21–32)
CO2: 36 MMOL/L (ref 21–32)
CO2: 36 MMOL/L (ref 21–32)
CO2: 37 MMOL/L (ref 21–32)
CO2: 39 MMOL/L (ref 21–32)
CO2: 40 MMOL/L (ref 21–32)
CO2: 42 MMOL/L (ref 21–32)
COCAINE METABOLITE SCREEN URINE: NORMAL
COCAINE METABOLITE SCREEN URINE: NORMAL
COLOR: ABNORMAL
COLOR: YELLOW
CORTISOL - AM: 16.1 UG/DL (ref 4.3–22.4)
CREAT SERPL-MCNC: 1.2 MG/DL (ref 0.6–1.1)
CREAT SERPL-MCNC: 1.2 MG/DL (ref 0.6–1.1)
CREAT SERPL-MCNC: 1.3 MG/DL (ref 0.6–1.1)
CREAT SERPL-MCNC: 1.3 MG/DL (ref 0.6–1.1)
CREAT SERPL-MCNC: 1.4 MG/DL (ref 0.6–1.1)
CREAT SERPL-MCNC: 1.5 MG/DL (ref 0.6–1.1)
CREAT SERPL-MCNC: 1.6 MG/DL (ref 0.6–1.1)
CREAT SERPL-MCNC: 1.7 MG/DL (ref 0.6–1.1)
CREAT SERPL-MCNC: 1.8 MG/DL (ref 0.6–1.1)
CREAT SERPL-MCNC: 1.9 MG/DL (ref 0.6–1.1)
CREAT SERPL-MCNC: 2 MG/DL (ref 0.6–1.1)
CREAT SERPL-MCNC: 2.1 MG/DL (ref 0.6–1.1)
CREAT SERPL-MCNC: 2.1 MG/DL (ref 0.6–1.1)
CREAT SERPL-MCNC: 2.2 MG/DL (ref 0.6–1.1)
CREAT SERPL-MCNC: 2.3 MG/DL (ref 0.6–1.1)
CREAT SERPL-MCNC: 2.4 MG/DL (ref 0.6–1.1)
CREAT SERPL-MCNC: 2.5 MG/DL (ref 0.6–1.1)
CREAT SERPL-MCNC: 2.6 MG/DL (ref 0.6–1.1)
CREAT SERPL-MCNC: 2.6 MG/DL (ref 0.6–1.1)
CREAT SERPL-MCNC: 2.7 MG/DL (ref 0.6–1.1)
CREAT SERPL-MCNC: 2.7 MG/DL (ref 0.6–1.1)
CREAT SERPL-MCNC: 2.8 MG/DL (ref 0.6–1.1)
CREAT SERPL-MCNC: 2.8 MG/DL (ref 0.6–1.1)
CREAT SERPL-MCNC: 2.9 MG/DL (ref 0.6–1.1)
CREAT SERPL-MCNC: 2.9 MG/DL (ref 0.6–1.1)
CREAT SERPL-MCNC: 3 MG/DL (ref 0.6–1.1)
CREAT SERPL-MCNC: 3 MG/DL (ref 0.6–1.1)
CREAT SERPL-MCNC: 3.2 MG/DL
CREAT SERPL-MCNC: 3.4 MG/DL (ref 0.6–1.1)
CREAT SERPL-MCNC: 3.4 MG/DL (ref 0.6–1.1)
CREAT SERPL-MCNC: 3.6 MG/DL (ref 0.6–1.1)
CREAT SERPL-MCNC: 3.9 MG/DL (ref 0.6–1.1)
CREAT SERPL-MCNC: 4 MG/DL (ref 0.6–1.1)
CREAT SERPL-MCNC: 4.6 MG/DL (ref 0.6–1.1)
CREAT SERPL-MCNC: 4.7 MG/DL (ref 0.6–1.1)
CREAT SERPL-MCNC: 4.8 MG/DL (ref 0.6–1.1)
CREAT SERPL-MCNC: 4.9 MG/DL (ref 0.6–1.1)
CREAT SERPL-MCNC: 5.4 MG/DL (ref 0.6–1.1)
CREATININE URINE: 42 MG/DL (ref 28–259)
CREATININE URINE: 56.3 MG/DL (ref 28–259)
CULTURE, BLOOD 2: ABNORMAL
CULTURE, BLOOD 2: ABNORMAL
CULTURE, BLOOD 2: NORMAL
DLCO %PRED: 49 %
DLCO PRED: NORMAL
DLCO/VA %PRED: NORMAL
DLCO/VA PRED: NORMAL
DLCO/VA: NORMAL
DLCO: NORMAL
EKG ATRIAL RATE: 100 BPM
EKG ATRIAL RATE: 105 BPM
EKG ATRIAL RATE: 108 BPM
EKG ATRIAL RATE: 114 BPM
EKG ATRIAL RATE: 129 BPM
EKG ATRIAL RATE: 131 BPM
EKG ATRIAL RATE: 256 BPM
EKG ATRIAL RATE: 258 BPM
EKG ATRIAL RATE: 500 BPM
EKG ATRIAL RATE: 77 BPM
EKG ATRIAL RATE: 82 BPM
EKG ATRIAL RATE: 85 BPM
EKG ATRIAL RATE: 88 BPM
EKG DIAGNOSIS: NORMAL
EKG P AXIS: 121 DEGREES
EKG P AXIS: 165 DEGREES
EKG P AXIS: 65 DEGREES
EKG P AXIS: 76 DEGREES
EKG P AXIS: 76 DEGREES
EKG P AXIS: 77 DEGREES
EKG P-R INTERVAL: 156 MS
EKG P-R INTERVAL: 184 MS
EKG P-R INTERVAL: 186 MS
EKG P-R INTERVAL: 190 MS
EKG P-R INTERVAL: 194 MS
EKG P-R INTERVAL: 200 MS
EKG P-R INTERVAL: 224 MS
EKG P-R INTERVAL: 224 MS
EKG Q-T INTERVAL: 336 MS
EKG Q-T INTERVAL: 344 MS
EKG Q-T INTERVAL: 356 MS
EKG Q-T INTERVAL: 356 MS
EKG Q-T INTERVAL: 358 MS
EKG Q-T INTERVAL: 380 MS
EKG Q-T INTERVAL: 394 MS
EKG Q-T INTERVAL: 404 MS
EKG Q-T INTERVAL: 406 MS
EKG Q-T INTERVAL: 406 MS
EKG Q-T INTERVAL: 418 MS
EKG Q-T INTERVAL: 422 MS
EKG Q-T INTERVAL: 448 MS
EKG QRS DURATION: 108 MS
EKG QRS DURATION: 108 MS
EKG QRS DURATION: 114 MS
EKG QRS DURATION: 118 MS
EKG QRS DURATION: 120 MS
EKG QRS DURATION: 128 MS
EKG QRS DURATION: 136 MS
EKG QRS DURATION: 146 MS
EKG QRS DURATION: 148 MS
EKG QRS DURATION: 160 MS
EKG QRS DURATION: 168 MS
EKG QRS DURATION: 42 MS
EKG QRS DURATION: 6 MS
EKG QTC CALCULATION (BAZETT): 392 MS
EKG QTC CALCULATION (BAZETT): 459 MS
EKG QTC CALCULATION (BAZETT): 460 MS
EKG QTC CALCULATION (BAZETT): 473 MS
EKG QTC CALCULATION (BAZETT): 490 MS
EKG QTC CALCULATION (BAZETT): 492 MS
EKG QTC CALCULATION (BAZETT): 496 MS
EKG QTC CALCULATION (BAZETT): 504 MS
EKG QTC CALCULATION (BAZETT): 510 MS
EKG QTC CALCULATION (BAZETT): 521 MS
EKG QTC CALCULATION (BAZETT): 528 MS
EKG QTC CALCULATION (BAZETT): 533 MS
EKG QTC CALCULATION (BAZETT): 568 MS
EKG R AXIS: -57 DEGREES
EKG R AXIS: -64 DEGREES
EKG R AXIS: -65 DEGREES
EKG R AXIS: -66 DEGREES
EKG R AXIS: -68 DEGREES
EKG R AXIS: -69 DEGREES
EKG R AXIS: -70 DEGREES
EKG R AXIS: -70 DEGREES
EKG R AXIS: -83 DEGREES
EKG R AXIS: -86 DEGREES
EKG R AXIS: -86 DEGREES
EKG R AXIS: 0 DEGREES
EKG R AXIS: 0 DEGREES
EKG T AXIS: 101 DEGREES
EKG T AXIS: 125 DEGREES
EKG T AXIS: 132 DEGREES
EKG T AXIS: 135 DEGREES
EKG T AXIS: 172 DEGREES
EKG T AXIS: 58 DEGREES
EKG T AXIS: 65 DEGREES
EKG T AXIS: 73 DEGREES
EKG T AXIS: 74 DEGREES
EKG T AXIS: 79 DEGREES
EKG T AXIS: 80 DEGREES
EKG T AXIS: 82 DEGREES
EKG T AXIS: 97 DEGREES
EKG VENTRICULAR RATE: 100 BPM
EKG VENTRICULAR RATE: 105 BPM
EKG VENTRICULAR RATE: 108 BPM
EKG VENTRICULAR RATE: 111 BPM
EKG VENTRICULAR RATE: 114 BPM
EKG VENTRICULAR RATE: 116 BPM
EKG VENTRICULAR RATE: 117 BPM
EKG VENTRICULAR RATE: 77 BPM
EKG VENTRICULAR RATE: 82 BPM
EKG VENTRICULAR RATE: 85 BPM
EKG VENTRICULAR RATE: 88 BPM
EKG VENTRICULAR RATE: 93 BPM
EKG VENTRICULAR RATE: 94 BPM
EOSINOPHIL,URINE: NORMAL
EOSINOPHILS ABSOLUTE: 0 K/UL (ref 0–0.6)
EOSINOPHILS ABSOLUTE: 0.1 K/UL (ref 0–0.6)
EOSINOPHILS ABSOLUTE: 0.2 K/UL (ref 0–0.6)
EOSINOPHILS RELATIVE PERCENT: 0 %
EOSINOPHILS RELATIVE PERCENT: 0.1 %
EOSINOPHILS RELATIVE PERCENT: 0.3 %
EOSINOPHILS RELATIVE PERCENT: 0.4 %
EOSINOPHILS RELATIVE PERCENT: 0.5 %
EOSINOPHILS RELATIVE PERCENT: 0.7 %
EOSINOPHILS RELATIVE PERCENT: 0.9 %
EOSINOPHILS RELATIVE PERCENT: 1 %
EOSINOPHILS RELATIVE PERCENT: 1.1 %
EOSINOPHILS RELATIVE PERCENT: 1.2 %
EOSINOPHILS RELATIVE PERCENT: 1.2 %
EOSINOPHILS RELATIVE PERCENT: 1.3 %
EOSINOPHILS RELATIVE PERCENT: 1.4 %
EOSINOPHILS RELATIVE PERCENT: 1.5 %
EOSINOPHILS RELATIVE PERCENT: 1.5 %
EOSINOPHILS RELATIVE PERCENT: 1.6 %
EOSINOPHILS RELATIVE PERCENT: 1.6 %
EOSINOPHILS RELATIVE PERCENT: 1.8 %
EOSINOPHILS RELATIVE PERCENT: 2 %
EOSINOPHILS RELATIVE PERCENT: 2.2 %
EOSINOPHILS RELATIVE PERCENT: 4 %
EPITHELIAL CELLS, UA: ABNORMAL /HPF (ref 0–5)
ESTIMATED AVERAGE GLUCOSE: 180 MG/DL
ESTIMATED AVERAGE GLUCOSE: 294.8 MG/DL
ESTIMATED AVERAGE GLUCOSE: 378.1 MG/DL
ETHANOL: NORMAL MG/DL (ref 0–0.08)
EXPIRATORY TIME-POST: NORMAL
EXPIRATORY TIME-POST: NORMAL
EXPIRATORY TIME: NORMAL
EXPIRATORY TIME: NORMAL
FEF 25-75% %CHNG: NORMAL
FEF 25-75% %CHNG: NORMAL
FEF 25-75% %PRED-POST: NORMAL
FEF 25-75% %PRED-POST: NORMAL
FEF 25-75% %PRED-PRE: NORMAL
FEF 25-75% %PRED-PRE: NORMAL
FEF 25-75% PRED: NORMAL
FEF 25-75% PRED: NORMAL
FEF 25-75%-POST: NORMAL
FEF 25-75%-POST: NORMAL
FEF 25-75%-PRE: NORMAL
FEF 25-75%-PRE: NORMAL
FERRITIN: 159.2 NG/ML (ref 15–150)
FEV1 %PRED-POST: 52 %
FEV1 %PRED-POST: 55 %
FEV1 %PRED-PRE: 44 %
FEV1 %PRED-PRE: 50 %
FEV1 PRED: NORMAL
FEV1 PRED: NORMAL
FEV1-POST: NORMAL
FEV1-POST: NORMAL
FEV1-PRE: NORMAL
FEV1-PRE: NORMAL
FEV1/FVC %PRED-POST: NORMAL
FEV1/FVC %PRED-POST: NORMAL
FEV1/FVC %PRED-PRE: NORMAL
FEV1/FVC %PRED-PRE: NORMAL
FEV1/FVC PRED: NORMAL
FEV1/FVC PRED: NORMAL
FEV1/FVC-POST: 67 %
FEV1/FVC-POST: 70 %
FEV1/FVC-PRE: 68 %
FEV1/FVC-PRE: 71 %
FVC %PRED-POST: NORMAL
FVC %PRED-POST: NORMAL
FVC %PRED-PRE: NORMAL
FVC %PRED-PRE: NORMAL
FVC PRED: NORMAL
FVC PRED: NORMAL
FVC-POST: NORMAL
FVC-POST: NORMAL
FVC-PRE: NORMAL
FVC-PRE: NORMAL
GAMMA GLOBULIN: 1 G/DL (ref 0.6–1.8)
GAW %PRED: NORMAL
GAW PRED: NORMAL
GAW: NORMAL
GFR AFRICAN AMERICAN: 10
GFR AFRICAN AMERICAN: 11
GFR AFRICAN AMERICAN: 12
GFR AFRICAN AMERICAN: 12
GFR AFRICAN AMERICAN: 14
GFR AFRICAN AMERICAN: 14
GFR AFRICAN AMERICAN: 16
GFR AFRICAN AMERICAN: 17
GFR AFRICAN AMERICAN: 17
GFR AFRICAN AMERICAN: 19
GFR AFRICAN AMERICAN: 19
GFR AFRICAN AMERICAN: 20
GFR AFRICAN AMERICAN: 20
GFR AFRICAN AMERICAN: 21
GFR AFRICAN AMERICAN: 21
GFR AFRICAN AMERICAN: 22
GFR AFRICAN AMERICAN: 22
GFR AFRICAN AMERICAN: 23
GFR AFRICAN AMERICAN: 23
GFR AFRICAN AMERICAN: 24
GFR AFRICAN AMERICAN: 25
GFR AFRICAN AMERICAN: 26
GFR AFRICAN AMERICAN: 28
GFR AFRICAN AMERICAN: 29
GFR AFRICAN AMERICAN: 29
GFR AFRICAN AMERICAN: 31
GFR AFRICAN AMERICAN: 33
GFR AFRICAN AMERICAN: 35
GFR AFRICAN AMERICAN: 37
GFR AFRICAN AMERICAN: 40
GFR AFRICAN AMERICAN: 43
GFR AFRICAN AMERICAN: 47
GFR AFRICAN AMERICAN: 51
GFR AFRICAN AMERICAN: 51
GFR AFRICAN AMERICAN: 56
GFR AFRICAN AMERICAN: 56
GFR CALCULATED: 15
GFR NON-AFRICAN AMERICAN: 10
GFR NON-AFRICAN AMERICAN: 10
GFR NON-AFRICAN AMERICAN: 12
GFR NON-AFRICAN AMERICAN: 12
GFR NON-AFRICAN AMERICAN: 13
GFR NON-AFRICAN AMERICAN: 14
GFR NON-AFRICAN AMERICAN: 14
GFR NON-AFRICAN AMERICAN: 16
GFR NON-AFRICAN AMERICAN: 16
GFR NON-AFRICAN AMERICAN: 17
GFR NON-AFRICAN AMERICAN: 18
GFR NON-AFRICAN AMERICAN: 18
GFR NON-AFRICAN AMERICAN: 19
GFR NON-AFRICAN AMERICAN: 19
GFR NON-AFRICAN AMERICAN: 20
GFR NON-AFRICAN AMERICAN: 21
GFR NON-AFRICAN AMERICAN: 22
GFR NON-AFRICAN AMERICAN: 23
GFR NON-AFRICAN AMERICAN: 24
GFR NON-AFRICAN AMERICAN: 24
GFR NON-AFRICAN AMERICAN: 26
GFR NON-AFRICAN AMERICAN: 27
GFR NON-AFRICAN AMERICAN: 29
GFR NON-AFRICAN AMERICAN: 31
GFR NON-AFRICAN AMERICAN: 33
GFR NON-AFRICAN AMERICAN: 36
GFR NON-AFRICAN AMERICAN: 39
GFR NON-AFRICAN AMERICAN: 42
GFR NON-AFRICAN AMERICAN: 42
GFR NON-AFRICAN AMERICAN: 46
GFR NON-AFRICAN AMERICAN: 46
GFR NON-AFRICAN AMERICAN: 8
GFR NON-AFRICAN AMERICAN: 9
GLOBULIN: 2.6 G/DL
GLOBULIN: 2.8 G/DL
GLOBULIN: 2.9 G/DL
GLOBULIN: 3 G/DL
GLOBULIN: 3.1 G/DL
GLOBULIN: 3.2 G/DL
GLOBULIN: 3.3 G/DL
GLOBULIN: 3.4 G/DL
GLOBULIN: 3.4 G/DL
GLOBULIN: 3.5 G/DL
GLOBULIN: 3.6 G/DL
GLOBULIN: 3.6 G/DL
GLOBULIN: 3.7 G/DL
GLOBULIN: 3.7 G/DL
GLOBULIN: 3.8 G/DL
GLOBULIN: 4 G/DL
GLOBULIN: 4.4 G/DL
GLUCOSE BLD-MCNC: 100 MG/DL (ref 70–99)
GLUCOSE BLD-MCNC: 101 MG/DL (ref 70–99)
GLUCOSE BLD-MCNC: 101 MG/DL (ref 70–99)
GLUCOSE BLD-MCNC: 102 MG/DL (ref 70–99)
GLUCOSE BLD-MCNC: 103 MG/DL (ref 70–99)
GLUCOSE BLD-MCNC: 103 MG/DL (ref 70–99)
GLUCOSE BLD-MCNC: 104 MG/DL (ref 70–99)
GLUCOSE BLD-MCNC: 104 MG/DL (ref 70–99)
GLUCOSE BLD-MCNC: 105 MG/DL (ref 70–99)
GLUCOSE BLD-MCNC: 105 MG/DL (ref 70–99)
GLUCOSE BLD-MCNC: 107 MG/DL (ref 70–99)
GLUCOSE BLD-MCNC: 107 MG/DL (ref 70–99)
GLUCOSE BLD-MCNC: 108 MG/DL (ref 70–99)
GLUCOSE BLD-MCNC: 108 MG/DL (ref 70–99)
GLUCOSE BLD-MCNC: 110 MG/DL (ref 70–99)
GLUCOSE BLD-MCNC: 111 MG/DL (ref 70–99)
GLUCOSE BLD-MCNC: 112 MG/DL (ref 70–99)
GLUCOSE BLD-MCNC: 113 MG/DL (ref 70–99)
GLUCOSE BLD-MCNC: 114 MG/DL (ref 70–99)
GLUCOSE BLD-MCNC: 115 MG/DL (ref 70–99)
GLUCOSE BLD-MCNC: 116 MG/DL (ref 70–99)
GLUCOSE BLD-MCNC: 117 MG/DL (ref 70–99)
GLUCOSE BLD-MCNC: 118 MG/DL (ref 70–99)
GLUCOSE BLD-MCNC: 118 MG/DL (ref 70–99)
GLUCOSE BLD-MCNC: 119 MG/DL (ref 70–99)
GLUCOSE BLD-MCNC: 121 MG/DL (ref 70–99)
GLUCOSE BLD-MCNC: 122 MG/DL (ref 70–99)
GLUCOSE BLD-MCNC: 123 MG/DL (ref 70–99)
GLUCOSE BLD-MCNC: 124 MG/DL (ref 70–99)
GLUCOSE BLD-MCNC: 124 MG/DL (ref 70–99)
GLUCOSE BLD-MCNC: 125 MG/DL (ref 70–99)
GLUCOSE BLD-MCNC: 125 MG/DL (ref 70–99)
GLUCOSE BLD-MCNC: 127 MG/DL (ref 70–99)
GLUCOSE BLD-MCNC: 128 MG/DL (ref 70–99)
GLUCOSE BLD-MCNC: 129 MG/DL (ref 70–99)
GLUCOSE BLD-MCNC: 130 MG/DL (ref 70–99)
GLUCOSE BLD-MCNC: 132 MG/DL (ref 70–99)
GLUCOSE BLD-MCNC: 133 MG/DL (ref 70–99)
GLUCOSE BLD-MCNC: 134 MG/DL (ref 70–99)
GLUCOSE BLD-MCNC: 135 MG/DL (ref 70–99)
GLUCOSE BLD-MCNC: 138 MG/DL (ref 70–99)
GLUCOSE BLD-MCNC: 139 MG/DL (ref 70–99)
GLUCOSE BLD-MCNC: 140 MG/DL (ref 70–99)
GLUCOSE BLD-MCNC: 141 MG/DL (ref 70–99)
GLUCOSE BLD-MCNC: 142 MG/DL (ref 70–99)
GLUCOSE BLD-MCNC: 143 MG/DL (ref 70–99)
GLUCOSE BLD-MCNC: 143 MG/DL (ref 70–99)
GLUCOSE BLD-MCNC: 146 MG/DL (ref 70–99)
GLUCOSE BLD-MCNC: 147 MG/DL (ref 70–99)
GLUCOSE BLD-MCNC: 147 MG/DL (ref 70–99)
GLUCOSE BLD-MCNC: 148 MG/DL (ref 70–99)
GLUCOSE BLD-MCNC: 149 MG/DL (ref 70–99)
GLUCOSE BLD-MCNC: 150 MG/DL (ref 70–99)
GLUCOSE BLD-MCNC: 150 MG/DL (ref 70–99)
GLUCOSE BLD-MCNC: 151 MG/DL (ref 70–99)
GLUCOSE BLD-MCNC: 152 MG/DL (ref 70–99)
GLUCOSE BLD-MCNC: 153 MG/DL (ref 70–99)
GLUCOSE BLD-MCNC: 154 MG/DL (ref 70–99)
GLUCOSE BLD-MCNC: 155 MG/DL (ref 70–99)
GLUCOSE BLD-MCNC: 155 MG/DL (ref 70–99)
GLUCOSE BLD-MCNC: 156 MG/DL (ref 70–99)
GLUCOSE BLD-MCNC: 156 MG/DL (ref 70–99)
GLUCOSE BLD-MCNC: 157 MG/DL (ref 70–99)
GLUCOSE BLD-MCNC: 157 MG/DL (ref 70–99)
GLUCOSE BLD-MCNC: 159 MG/DL (ref 70–99)
GLUCOSE BLD-MCNC: 159 MG/DL (ref 70–99)
GLUCOSE BLD-MCNC: 160 MG/DL (ref 70–99)
GLUCOSE BLD-MCNC: 160 MG/DL (ref 70–99)
GLUCOSE BLD-MCNC: 161 MG/DL (ref 70–99)
GLUCOSE BLD-MCNC: 162 MG/DL (ref 70–99)
GLUCOSE BLD-MCNC: 163 MG/DL (ref 70–99)
GLUCOSE BLD-MCNC: 164 MG/DL (ref 70–99)
GLUCOSE BLD-MCNC: 164 MG/DL (ref 70–99)
GLUCOSE BLD-MCNC: 166 MG/DL (ref 70–99)
GLUCOSE BLD-MCNC: 167 MG/DL (ref 70–99)
GLUCOSE BLD-MCNC: 167 MG/DL (ref 70–99)
GLUCOSE BLD-MCNC: 168 MG/DL (ref 70–99)
GLUCOSE BLD-MCNC: 170 MG/DL (ref 70–99)
GLUCOSE BLD-MCNC: 171 MG/DL (ref 70–99)
GLUCOSE BLD-MCNC: 172 MG/DL (ref 70–99)
GLUCOSE BLD-MCNC: 173 MG/DL (ref 70–99)
GLUCOSE BLD-MCNC: 173 MG/DL (ref 70–99)
GLUCOSE BLD-MCNC: 174 MG/DL (ref 70–99)
GLUCOSE BLD-MCNC: 174 MG/DL (ref 70–99)
GLUCOSE BLD-MCNC: 175 MG/DL (ref 70–99)
GLUCOSE BLD-MCNC: 175 MG/DL (ref 70–99)
GLUCOSE BLD-MCNC: 176 MG/DL (ref 70–99)
GLUCOSE BLD-MCNC: 176 MG/DL (ref 70–99)
GLUCOSE BLD-MCNC: 177 MG/DL (ref 70–99)
GLUCOSE BLD-MCNC: 177 MG/DL (ref 70–99)
GLUCOSE BLD-MCNC: 179 MG/DL (ref 70–99)
GLUCOSE BLD-MCNC: 179 MG/DL (ref 70–99)
GLUCOSE BLD-MCNC: 180 MG/DL (ref 70–99)
GLUCOSE BLD-MCNC: 180 MG/DL (ref 70–99)
GLUCOSE BLD-MCNC: 181 MG/DL (ref 70–99)
GLUCOSE BLD-MCNC: 183 MG/DL (ref 70–99)
GLUCOSE BLD-MCNC: 184 MG/DL (ref 70–99)
GLUCOSE BLD-MCNC: 184 MG/DL (ref 70–99)
GLUCOSE BLD-MCNC: 186 MG/DL (ref 70–99)
GLUCOSE BLD-MCNC: 188 MG/DL (ref 70–99)
GLUCOSE BLD-MCNC: 189 MG/DL (ref 70–99)
GLUCOSE BLD-MCNC: 190 MG/DL (ref 70–99)
GLUCOSE BLD-MCNC: 190 MG/DL (ref 70–99)
GLUCOSE BLD-MCNC: 191 MG/DL (ref 70–99)
GLUCOSE BLD-MCNC: 192 MG/DL (ref 70–99)
GLUCOSE BLD-MCNC: 193 MG/DL
GLUCOSE BLD-MCNC: 195 MG/DL (ref 70–99)
GLUCOSE BLD-MCNC: 196 MG/DL (ref 70–99)
GLUCOSE BLD-MCNC: 197 MG/DL (ref 70–99)
GLUCOSE BLD-MCNC: 198 MG/DL (ref 70–99)
GLUCOSE BLD-MCNC: 199 MG/DL (ref 70–99)
GLUCOSE BLD-MCNC: 200 MG/DL (ref 70–99)
GLUCOSE BLD-MCNC: 200 MG/DL (ref 70–99)
GLUCOSE BLD-MCNC: 201 MG/DL (ref 70–99)
GLUCOSE BLD-MCNC: 201 MG/DL (ref 70–99)
GLUCOSE BLD-MCNC: 202 MG/DL (ref 70–99)
GLUCOSE BLD-MCNC: 203 MG/DL (ref 70–99)
GLUCOSE BLD-MCNC: 204 MG/DL (ref 70–99)
GLUCOSE BLD-MCNC: 204 MG/DL (ref 70–99)
GLUCOSE BLD-MCNC: 205 MG/DL (ref 70–99)
GLUCOSE BLD-MCNC: 206 MG/DL (ref 70–99)
GLUCOSE BLD-MCNC: 209 MG/DL (ref 70–99)
GLUCOSE BLD-MCNC: 21 MG/DL (ref 70–99)
GLUCOSE BLD-MCNC: 210 MG/DL (ref 70–99)
GLUCOSE BLD-MCNC: 211 MG/DL (ref 70–99)
GLUCOSE BLD-MCNC: 211 MG/DL (ref 70–99)
GLUCOSE BLD-MCNC: 212 MG/DL (ref 70–99)
GLUCOSE BLD-MCNC: 214 MG/DL (ref 70–99)
GLUCOSE BLD-MCNC: 214 MG/DL (ref 70–99)
GLUCOSE BLD-MCNC: 215 MG/DL (ref 70–99)
GLUCOSE BLD-MCNC: 215 MG/DL (ref 70–99)
GLUCOSE BLD-MCNC: 216 MG/DL (ref 70–99)
GLUCOSE BLD-MCNC: 218 MG/DL (ref 70–99)
GLUCOSE BLD-MCNC: 218 MG/DL (ref 70–99)
GLUCOSE BLD-MCNC: 219 MG/DL (ref 70–99)
GLUCOSE BLD-MCNC: 222 MG/DL (ref 70–99)
GLUCOSE BLD-MCNC: 223 MG/DL (ref 70–99)
GLUCOSE BLD-MCNC: 224 MG/DL (ref 70–99)
GLUCOSE BLD-MCNC: 227 MG/DL (ref 70–99)
GLUCOSE BLD-MCNC: 228 MG/DL (ref 70–99)
GLUCOSE BLD-MCNC: 229 MG/DL (ref 70–99)
GLUCOSE BLD-MCNC: 229 MG/DL (ref 70–99)
GLUCOSE BLD-MCNC: 23 MG/DL (ref 70–99)
GLUCOSE BLD-MCNC: 230 MG/DL (ref 70–99)
GLUCOSE BLD-MCNC: 231 MG/DL (ref 70–99)
GLUCOSE BLD-MCNC: 232 MG/DL (ref 70–99)
GLUCOSE BLD-MCNC: 233 MG/DL (ref 70–99)
GLUCOSE BLD-MCNC: 234 MG/DL (ref 70–99)
GLUCOSE BLD-MCNC: 236 MG/DL (ref 70–99)
GLUCOSE BLD-MCNC: 236 MG/DL (ref 70–99)
GLUCOSE BLD-MCNC: 237 MG/DL (ref 70–99)
GLUCOSE BLD-MCNC: 237 MG/DL (ref 70–99)
GLUCOSE BLD-MCNC: 239 MG/DL (ref 70–99)
GLUCOSE BLD-MCNC: 239 MG/DL (ref 70–99)
GLUCOSE BLD-MCNC: 24 MG/DL (ref 70–99)
GLUCOSE BLD-MCNC: 242 MG/DL (ref 70–99)
GLUCOSE BLD-MCNC: 242 MG/DL (ref 70–99)
GLUCOSE BLD-MCNC: 243 MG/DL (ref 70–99)
GLUCOSE BLD-MCNC: 247 MG/DL (ref 70–99)
GLUCOSE BLD-MCNC: 248 MG/DL (ref 70–99)
GLUCOSE BLD-MCNC: 249 MG/DL (ref 70–99)
GLUCOSE BLD-MCNC: 249 MG/DL (ref 70–99)
GLUCOSE BLD-MCNC: 253 MG/DL (ref 70–99)
GLUCOSE BLD-MCNC: 255 MG/DL (ref 70–99)
GLUCOSE BLD-MCNC: 255 MG/DL (ref 70–99)
GLUCOSE BLD-MCNC: 256 MG/DL (ref 70–99)
GLUCOSE BLD-MCNC: 257 MG/DL (ref 70–99)
GLUCOSE BLD-MCNC: 258 MG/DL (ref 70–99)
GLUCOSE BLD-MCNC: 260 MG/DL (ref 70–99)
GLUCOSE BLD-MCNC: 261 MG/DL (ref 70–99)
GLUCOSE BLD-MCNC: 265 MG/DL (ref 70–99)
GLUCOSE BLD-MCNC: 266 MG/DL (ref 70–99)
GLUCOSE BLD-MCNC: 267 MG/DL (ref 70–99)
GLUCOSE BLD-MCNC: 267 MG/DL (ref 70–99)
GLUCOSE BLD-MCNC: 270 MG/DL (ref 70–99)
GLUCOSE BLD-MCNC: 271 MG/DL (ref 70–99)
GLUCOSE BLD-MCNC: 271 MG/DL (ref 70–99)
GLUCOSE BLD-MCNC: 272 MG/DL (ref 70–99)
GLUCOSE BLD-MCNC: 272 MG/DL (ref 70–99)
GLUCOSE BLD-MCNC: 274 MG/DL (ref 70–99)
GLUCOSE BLD-MCNC: 277 MG/DL (ref 70–99)
GLUCOSE BLD-MCNC: 278 MG/DL (ref 70–99)
GLUCOSE BLD-MCNC: 282 MG/DL (ref 70–99)
GLUCOSE BLD-MCNC: 286 MG/DL (ref 70–99)
GLUCOSE BLD-MCNC: 291 MG/DL (ref 70–99)
GLUCOSE BLD-MCNC: 291 MG/DL (ref 70–99)
GLUCOSE BLD-MCNC: 305 MG/DL (ref 70–99)
GLUCOSE BLD-MCNC: 307 MG/DL (ref 70–99)
GLUCOSE BLD-MCNC: 319 MG/DL (ref 70–99)
GLUCOSE BLD-MCNC: 321 MG/DL (ref 70–99)
GLUCOSE BLD-MCNC: 331 MG/DL (ref 70–99)
GLUCOSE BLD-MCNC: 343 MG/DL (ref 70–99)
GLUCOSE BLD-MCNC: 355 MG/DL (ref 70–99)
GLUCOSE BLD-MCNC: 370 MG/DL (ref 70–99)
GLUCOSE BLD-MCNC: 373 MG/DL (ref 70–99)
GLUCOSE BLD-MCNC: 374 MG/DL (ref 70–99)
GLUCOSE BLD-MCNC: 375 MG/DL (ref 70–99)
GLUCOSE BLD-MCNC: 380 MG/DL (ref 70–99)
GLUCOSE BLD-MCNC: 39 MG/DL (ref 70–99)
GLUCOSE BLD-MCNC: 39 MG/DL (ref 70–99)
GLUCOSE BLD-MCNC: 398 MG/DL (ref 70–99)
GLUCOSE BLD-MCNC: 406 MG/DL (ref 70–99)
GLUCOSE BLD-MCNC: 45 MG/DL (ref 70–99)
GLUCOSE BLD-MCNC: 464 MG/DL
GLUCOSE BLD-MCNC: 464 MG/DL (ref 70–99)
GLUCOSE BLD-MCNC: 48 MG/DL (ref 70–99)
GLUCOSE BLD-MCNC: 493 MG/DL (ref 70–99)
GLUCOSE BLD-MCNC: 509 MG/DL (ref 70–99)
GLUCOSE BLD-MCNC: 51 MG/DL (ref 70–99)
GLUCOSE BLD-MCNC: 54 MG/DL (ref 70–99)
GLUCOSE BLD-MCNC: 57 MG/DL (ref 70–99)
GLUCOSE BLD-MCNC: 59 MG/DL (ref 70–99)
GLUCOSE BLD-MCNC: 60 MG/DL (ref 70–99)
GLUCOSE BLD-MCNC: 61 MG/DL (ref 70–99)
GLUCOSE BLD-MCNC: 61 MG/DL (ref 70–99)
GLUCOSE BLD-MCNC: 62 MG/DL (ref 70–99)
GLUCOSE BLD-MCNC: 63 MG/DL (ref 70–99)
GLUCOSE BLD-MCNC: 64 MG/DL
GLUCOSE BLD-MCNC: 64 MG/DL (ref 70–99)
GLUCOSE BLD-MCNC: 64 MG/DL (ref 70–99)
GLUCOSE BLD-MCNC: 66 MG/DL (ref 70–99)
GLUCOSE BLD-MCNC: 69 MG/DL (ref 70–99)
GLUCOSE BLD-MCNC: 693 MG/DL (ref 70–99)
GLUCOSE BLD-MCNC: 70 MG/DL (ref 70–99)
GLUCOSE BLD-MCNC: 70 MG/DL (ref 70–99)
GLUCOSE BLD-MCNC: 71 MG/DL (ref 70–99)
GLUCOSE BLD-MCNC: 72 MG/DL (ref 70–99)
GLUCOSE BLD-MCNC: 74 MG/DL (ref 70–99)
GLUCOSE BLD-MCNC: 75 MG/DL (ref 70–99)
GLUCOSE BLD-MCNC: 75 MG/DL (ref 70–99)
GLUCOSE BLD-MCNC: 76 MG/DL (ref 70–99)
GLUCOSE BLD-MCNC: 77 MG/DL (ref 70–99)
GLUCOSE BLD-MCNC: 78 MG/DL (ref 70–99)
GLUCOSE BLD-MCNC: 79 MG/DL (ref 70–99)
GLUCOSE BLD-MCNC: 81 MG/DL (ref 70–99)
GLUCOSE BLD-MCNC: 83 MG/DL (ref 70–99)
GLUCOSE BLD-MCNC: 84 MG/DL (ref 70–99)
GLUCOSE BLD-MCNC: 84 MG/DL (ref 70–99)
GLUCOSE BLD-MCNC: 85 MG/DL (ref 70–99)
GLUCOSE BLD-MCNC: 86 MG/DL (ref 70–99)
GLUCOSE BLD-MCNC: 87 MG/DL (ref 70–99)
GLUCOSE BLD-MCNC: 88 MG/DL (ref 70–99)
GLUCOSE BLD-MCNC: 88 MG/DL (ref 70–99)
GLUCOSE BLD-MCNC: 89 MG/DL (ref 70–99)
GLUCOSE BLD-MCNC: 89 MG/DL (ref 70–99)
GLUCOSE BLD-MCNC: 90 MG/DL (ref 70–99)
GLUCOSE BLD-MCNC: 91 MG/DL (ref 70–99)
GLUCOSE BLD-MCNC: 95 MG/DL (ref 70–99)
GLUCOSE BLD-MCNC: 96 MG/DL (ref 70–99)
GLUCOSE BLD-MCNC: 96 MG/DL (ref 70–99)
GLUCOSE BLD-MCNC: 97 MG/DL (ref 70–99)
GLUCOSE BLD-MCNC: 98 MG/DL (ref 70–99)
GLUCOSE BLD-MCNC: 99 MG/DL (ref 70–99)
GLUCOSE URINE: >=1000 MG/DL
GLUCOSE URINE: NEGATIVE MG/DL
HBA1C MFR BLD: 11.9 %
HBA1C MFR BLD: 14.8 %
HBA1C MFR BLD: 7.9 %
HBV SURFACE AB TITR SER: <3.5 MIU/ML
HCO3 ARTERIAL: 15.6 MMOL/L (ref 21–29)
HCO3 ARTERIAL: 18.9 MMOL/L (ref 21–29)
HCO3 ARTERIAL: 35.2 MMOL/L (ref 21–29)
HCO3 ARTERIAL: 37 MMOL/L (ref 21–29)
HCO3 ARTERIAL: 42.3 MMOL/L (ref 21–29)
HCO3 VENOUS: 16.8 MMOL/L (ref 23–29)
HCO3 VENOUS: 26 MMOL/L (ref 23–29)
HCO3 VENOUS: 32.7 MMOL/L (ref 23–29)
HCO3 VENOUS: 35.9 MMOL/L (ref 23–29)
HCO3 VENOUS: 36.5 MMOL/L (ref 23–29)
HCT VFR BLD CALC: 23.5 % (ref 36–48)
HCT VFR BLD CALC: 23.6 % (ref 36–48)
HCT VFR BLD CALC: 23.8 % (ref 36–48)
HCT VFR BLD CALC: 24.5 % (ref 36–48)
HCT VFR BLD CALC: 25.4 % (ref 36–48)
HCT VFR BLD CALC: 25.5 % (ref 36–48)
HCT VFR BLD CALC: 25.6 % (ref 36–48)
HCT VFR BLD CALC: 25.7 % (ref 36–48)
HCT VFR BLD CALC: 26.4 % (ref 36–48)
HCT VFR BLD CALC: 27 % (ref 36–48)
HCT VFR BLD CALC: 27.4 % (ref 36–48)
HCT VFR BLD CALC: 28 % (ref 36–48)
HCT VFR BLD CALC: 28.1 % (ref 36–48)
HCT VFR BLD CALC: 28.3 % (ref 36–48)
HCT VFR BLD CALC: 28.5 % (ref 36–48)
HCT VFR BLD CALC: 28.9 % (ref 36–48)
HCT VFR BLD CALC: 29.5 % (ref 36–48)
HCT VFR BLD CALC: 29.7 % (ref 36–48)
HCT VFR BLD CALC: 31.5 % (ref 36–48)
HCT VFR BLD CALC: 34.8 % (ref 36–48)
HCT VFR BLD CALC: 35.6 % (ref 36–48)
HCT VFR BLD CALC: 36.6 % (ref 36–48)
HCT VFR BLD CALC: 36.8 % (ref 36–48)
HCT VFR BLD CALC: 37.4 % (ref 36–48)
HCT VFR BLD CALC: 37.6 % (ref 36–48)
HCT VFR BLD CALC: 38.6 % (ref 36–48)
HCT VFR BLD CALC: 38.6 % (ref 36–48)
HCT VFR BLD CALC: 38.7 % (ref 36–48)
HCT VFR BLD CALC: 39 % (ref 36–48)
HCT VFR BLD CALC: 39.5 % (ref 36–48)
HCT VFR BLD CALC: 39.6 % (ref 36–48)
HCT VFR BLD CALC: 39.8 % (ref 36–48)
HCT VFR BLD CALC: 40.3 % (ref 36–48)
HCT VFR BLD CALC: 40.8 % (ref 36–48)
HCT VFR BLD CALC: 40.8 % (ref 36–48)
HCT VFR BLD CALC: 40.9 % (ref 36–48)
HCT VFR BLD CALC: 41.7 % (ref 36–48)
HCT VFR BLD CALC: 42.6 % (ref 36–48)
HCT VFR BLD CALC: 42.9 % (ref 36–48)
HCT VFR BLD CALC: 44 % (ref 36–48)
HCT VFR BLD CALC: 44 % (ref 36–48)
HCT VFR BLD CALC: 44.4 % (ref 36–48)
HCT VFR BLD CALC: 49.3 % (ref 36–48)
HCV QNT BY NAAT IU/ML: NOT DETECTED IU/ML
HCV QNT BY NAAT LOG IU/ML: NOT DETECTED LOG IU/ML
HDLC SERPL-MCNC: 32 MG/DL (ref 40–60)
HDLC SERPL-MCNC: 39 MG/DL (ref 40–60)
HEMOGLOBIN, ART, EXTENDED: 11.6 G/DL (ref 12–16)
HEMOGLOBIN, ART, EXTENDED: 13.3 G/DL
HEMOGLOBIN, ART, EXTENDED: 8.7 G/DL (ref 12–16)
HEMOGLOBIN, ART, EXTENDED: 9.1 G/DL (ref 12–16)
HEMOGLOBIN, ART, EXTENDED: 9.3 G/DL (ref 12–16)
HEMOGLOBIN: 11.5 G/DL (ref 12–16)
HEMOGLOBIN: 11.7 G/DL (ref 12–16)
HEMOGLOBIN: 12.1 G/DL (ref 12–16)
HEMOGLOBIN: 12.2 G/DL (ref 12–16)
HEMOGLOBIN: 12.3 G/DL (ref 12–16)
HEMOGLOBIN: 12.3 G/DL (ref 12–16)
HEMOGLOBIN: 12.6 G/DL (ref 12–16)
HEMOGLOBIN: 12.7 G/DL (ref 12–16)
HEMOGLOBIN: 12.8 G/DL (ref 12–16)
HEMOGLOBIN: 12.9 G/DL (ref 12–16)
HEMOGLOBIN: 12.9 G/DL (ref 12–16)
HEMOGLOBIN: 13.1 G/DL (ref 12–16)
HEMOGLOBIN: 13.2 G/DL (ref 12–16)
HEMOGLOBIN: 13.3 G/DL (ref 12–16)
HEMOGLOBIN: 13.3 G/DL (ref 12–16)
HEMOGLOBIN: 13.4 G/DL (ref 12–16)
HEMOGLOBIN: 13.4 G/DL (ref 12–16)
HEMOGLOBIN: 13.8 G/DL (ref 12–16)
HEMOGLOBIN: 14.1 G/DL (ref 12–16)
HEMOGLOBIN: 14.3 G/DL (ref 12–16)
HEMOGLOBIN: 14.5 G/DL (ref 12–16)
HEMOGLOBIN: 14.7 G/DL (ref 12–16)
HEMOGLOBIN: 15 G/DL (ref 12–16)
HEMOGLOBIN: 17.1 G/DL (ref 12–16)
HEMOGLOBIN: 7.3 G/DL (ref 12–16)
HEMOGLOBIN: 7.4 G/DL (ref 12–16)
HEMOGLOBIN: 7.5 G/DL (ref 12–16)
HEMOGLOBIN: 7.7 G/DL (ref 12–16)
HEMOGLOBIN: 7.9 G/DL (ref 12–16)
HEMOGLOBIN: 8 G/DL (ref 12–16)
HEMOGLOBIN: 8.5 G/DL (ref 12–16)
HEMOGLOBIN: 8.5 G/DL (ref 12–16)
HEMOGLOBIN: 8.9 G/DL (ref 12–16)
HEMOGLOBIN: 8.9 G/DL (ref 12–16)
HEMOGLOBIN: 9.2 G/DL (ref 12–16)
HEMOGLOBIN: 9.2 G/DL (ref 12–16)
HEMOGLOBIN: 9.4 G/DL (ref 12–16)
HEMOGLOBIN: 9.5 G/DL (ref 12–16)
HEMOGLOBIN: 9.5 G/DL (ref 12–16)
HEMOGLOBIN: 9.9 G/DL (ref 12–16)
HEPATITIS B CORE TOTAL ANTIBODY: NEGATIVE
HEPATITIS B SURFACE ANTIGEN INTERPRETATION: NORMAL
HYPOCHROMIA: ABNORMAL
IC %PRED: NORMAL
IC PRED: NORMAL
IC: NORMAL
IGG 4: 21 MG/DL (ref 1–123)
INR BLD: 1.16 (ref 0.86–1.14)
INR BLD: 1.58 (ref 0.88–1.12)
INR BLD: 2.53 (ref 0.88–1.12)
INR BLD: 3.75 (ref 0.88–1.12)
INTERPRETATION: NOT DETECTED
IRON SATURATION: 12 % (ref 15–50)
IRON: 39 UG/DL (ref 37–145)
KETONES, URINE: ABNORMAL MG/DL
KETONES, URINE: NEGATIVE MG/DL
LACTIC ACID, SEPSIS: 1.4 MMOL/L (ref 0.4–1.9)
LACTIC ACID, SEPSIS: 1.6 MMOL/L (ref 0.4–1.9)
LACTIC ACID, SEPSIS: 2.7 MMOL/L (ref 0.4–1.9)
LACTIC ACID, SEPSIS: 3.3 MMOL/L (ref 0.4–1.9)
LACTIC ACID, SEPSIS: 4.6 MMOL/L (ref 0.4–1.9)
LACTIC ACID: 1.7 MMOL/L (ref 0.4–2)
LACTIC ACID: 2.3 MMOL/L (ref 0.4–2)
LACTIC ACID: 2.5 MMOL/L (ref 0.4–2)
LACTIC ACID: 3.9 MMOL/L (ref 0.4–2)
LACTIC ACID: 4.2 MMOL/L (ref 0.4–2)
LDL CHOLESTEROL CALCULATED: 38 MG/DL
LDL CHOLESTEROL CALCULATED: 70 MG/DL
LEUKOCYTE ESTERASE, URINE: ABNORMAL
LEUKOCYTE ESTERASE, URINE: NEGATIVE
LIPASE: 740 U/L (ref 13–60)
LV EF: 25 %
LVEF MODALITY: NORMAL
LYMPHOCYTES ABSOLUTE: 0.5 K/UL (ref 1–5.1)
LYMPHOCYTES ABSOLUTE: 0.5 K/UL (ref 1–5.1)
LYMPHOCYTES ABSOLUTE: 0.6 K/UL (ref 1–5.1)
LYMPHOCYTES ABSOLUTE: 0.7 K/UL (ref 1–5.1)
LYMPHOCYTES ABSOLUTE: 0.8 K/UL (ref 1–5.1)
LYMPHOCYTES ABSOLUTE: 0.9 K/UL (ref 1–5.1)
LYMPHOCYTES ABSOLUTE: 0.9 K/UL (ref 1–5.1)
LYMPHOCYTES ABSOLUTE: 1.3 K/UL (ref 1–5.1)
LYMPHOCYTES ABSOLUTE: 1.4 K/UL (ref 1–5.1)
LYMPHOCYTES ABSOLUTE: 1.6 K/UL (ref 1–5.1)
LYMPHOCYTES RELATIVE PERCENT: 10 %
LYMPHOCYTES RELATIVE PERCENT: 10.3 %
LYMPHOCYTES RELATIVE PERCENT: 11.2 %
LYMPHOCYTES RELATIVE PERCENT: 11.8 %
LYMPHOCYTES RELATIVE PERCENT: 12.5 %
LYMPHOCYTES RELATIVE PERCENT: 13.3 %
LYMPHOCYTES RELATIVE PERCENT: 14.1 %
LYMPHOCYTES RELATIVE PERCENT: 5.6 %
LYMPHOCYTES RELATIVE PERCENT: 5.7 %
LYMPHOCYTES RELATIVE PERCENT: 6 %
LYMPHOCYTES RELATIVE PERCENT: 6 %
LYMPHOCYTES RELATIVE PERCENT: 6.2 %
LYMPHOCYTES RELATIVE PERCENT: 6.7 %
LYMPHOCYTES RELATIVE PERCENT: 7.2 %
LYMPHOCYTES RELATIVE PERCENT: 7.8 %
LYMPHOCYTES RELATIVE PERCENT: 8 %
LYMPHOCYTES RELATIVE PERCENT: 8.3 %
LYMPHOCYTES RELATIVE PERCENT: 8.3 %
LYMPHOCYTES RELATIVE PERCENT: 8.7 %
LYMPHOCYTES RELATIVE PERCENT: 8.7 %
LYMPHOCYTES RELATIVE PERCENT: 9 %
LYMPHOCYTES RELATIVE PERCENT: 9 %
LYMPHOCYTES RELATIVE PERCENT: 9.1 %
LYMPHOCYTES RELATIVE PERCENT: 9.1 %
LYMPHOCYTES RELATIVE PERCENT: 9.2 %
LYMPHOCYTES RELATIVE PERCENT: 9.5 %
LYMPHOCYTES RELATIVE PERCENT: 9.6 %
LYMPHOCYTES RELATIVE PERCENT: 9.6 %
Lab: NORMAL
Lab: NORMAL
MACROCYTES: ABNORMAL
MAGNESIUM: 1.5 MG/DL (ref 1.8–2.4)
MAGNESIUM: 1.8 MG/DL (ref 1.8–2.4)
MAGNESIUM: 1.9 MG/DL (ref 1.8–2.4)
MAGNESIUM: 2 MG/DL (ref 1.8–2.4)
MAGNESIUM: 2.1 MG/DL (ref 1.8–2.4)
MAGNESIUM: 2.2 MG/DL (ref 1.8–2.4)
MAGNESIUM: 2.2 MG/DL (ref 1.8–2.4)
MAGNESIUM: 2.3 MG/DL (ref 1.8–2.4)
MAGNESIUM: 2.4 MG/DL (ref 1.8–2.4)
MAGNESIUM: 2.6 MG/DL (ref 1.8–2.4)
MAGNESIUM: 2.6 MG/DL (ref 1.8–2.4)
MAGNESIUM: 2.8 MG/DL (ref 1.8–2.4)
MCH RBC QN AUTO: 24.8 PG (ref 26–34)
MCH RBC QN AUTO: 24.9 PG (ref 26–34)
MCH RBC QN AUTO: 25.3 PG (ref 26–34)
MCH RBC QN AUTO: 26 PG (ref 26–34)
MCH RBC QN AUTO: 26.2 PG (ref 26–34)
MCH RBC QN AUTO: 26.5 PG (ref 26–34)
MCH RBC QN AUTO: 26.6 PG (ref 26–34)
MCH RBC QN AUTO: 26.7 PG (ref 26–34)
MCH RBC QN AUTO: 26.7 PG (ref 26–34)
MCH RBC QN AUTO: 26.8 PG (ref 26–34)
MCH RBC QN AUTO: 26.9 PG (ref 26–34)
MCH RBC QN AUTO: 26.9 PG (ref 26–34)
MCH RBC QN AUTO: 27.1 PG (ref 26–34)
MCH RBC QN AUTO: 27.1 PG (ref 26–34)
MCH RBC QN AUTO: 27.9 PG (ref 26–34)
MCH RBC QN AUTO: 28 PG (ref 26–34)
MCH RBC QN AUTO: 28.2 PG (ref 26–34)
MCH RBC QN AUTO: 28.3 PG (ref 26–34)
MCH RBC QN AUTO: 28.4 PG (ref 26–34)
MCH RBC QN AUTO: 28.5 PG (ref 26–34)
MCH RBC QN AUTO: 28.5 PG (ref 26–34)
MCH RBC QN AUTO: 28.6 PG (ref 26–34)
MCH RBC QN AUTO: 28.8 PG (ref 26–34)
MCH RBC QN AUTO: 28.8 PG (ref 26–34)
MCH RBC QN AUTO: 28.9 PG (ref 26–34)
MCH RBC QN AUTO: 29 PG (ref 26–34)
MCH RBC QN AUTO: 29 PG (ref 26–34)
MCH RBC QN AUTO: 29.1 PG (ref 26–34)
MCH RBC QN AUTO: 29.2 PG (ref 26–34)
MCH RBC QN AUTO: 29.2 PG (ref 26–34)
MCH RBC QN AUTO: 29.4 PG (ref 26–34)
MCH RBC QN AUTO: 29.6 PG (ref 26–34)
MCH RBC QN AUTO: 29.8 PG (ref 26–34)
MCH RBC QN AUTO: 30 PG (ref 26–34)
MCH RBC QN AUTO: 30 PG (ref 26–34)
MCH RBC QN AUTO: 30.1 PG (ref 26–34)
MCH RBC QN AUTO: 31 PG (ref 26–34)
MCHC RBC AUTO-ENTMCNC: 29.8 G/DL (ref 31–36)
MCHC RBC AUTO-ENTMCNC: 30.3 G/DL (ref 31–36)
MCHC RBC AUTO-ENTMCNC: 30.3 G/DL (ref 31–36)
MCHC RBC AUTO-ENTMCNC: 31 G/DL (ref 31–36)
MCHC RBC AUTO-ENTMCNC: 31.1 G/DL (ref 31–36)
MCHC RBC AUTO-ENTMCNC: 31.2 G/DL (ref 31–36)
MCHC RBC AUTO-ENTMCNC: 31.2 G/DL (ref 31–36)
MCHC RBC AUTO-ENTMCNC: 31.3 G/DL (ref 31–36)
MCHC RBC AUTO-ENTMCNC: 31.4 G/DL (ref 31–36)
MCHC RBC AUTO-ENTMCNC: 31.7 G/DL (ref 31–36)
MCHC RBC AUTO-ENTMCNC: 31.7 G/DL (ref 31–36)
MCHC RBC AUTO-ENTMCNC: 32 G/DL (ref 31–36)
MCHC RBC AUTO-ENTMCNC: 32.4 G/DL (ref 31–36)
MCHC RBC AUTO-ENTMCNC: 32.4 G/DL (ref 31–36)
MCHC RBC AUTO-ENTMCNC: 32.5 G/DL (ref 31–36)
MCHC RBC AUTO-ENTMCNC: 32.6 G/DL (ref 31–36)
MCHC RBC AUTO-ENTMCNC: 32.6 G/DL (ref 31–36)
MCHC RBC AUTO-ENTMCNC: 32.7 G/DL (ref 31–36)
MCHC RBC AUTO-ENTMCNC: 32.7 G/DL (ref 31–36)
MCHC RBC AUTO-ENTMCNC: 32.8 G/DL (ref 31–36)
MCHC RBC AUTO-ENTMCNC: 32.9 G/DL (ref 31–36)
MCHC RBC AUTO-ENTMCNC: 33.1 G/DL (ref 31–36)
MCHC RBC AUTO-ENTMCNC: 33.2 G/DL (ref 31–36)
MCHC RBC AUTO-ENTMCNC: 33.2 G/DL (ref 31–36)
MCHC RBC AUTO-ENTMCNC: 33.3 G/DL (ref 31–36)
MCHC RBC AUTO-ENTMCNC: 33.3 G/DL (ref 31–36)
MCHC RBC AUTO-ENTMCNC: 33.5 G/DL (ref 31–36)
MCHC RBC AUTO-ENTMCNC: 33.5 G/DL (ref 31–36)
MCHC RBC AUTO-ENTMCNC: 33.7 G/DL (ref 31–36)
MCHC RBC AUTO-ENTMCNC: 34.1 G/DL (ref 31–36)
MCHC RBC AUTO-ENTMCNC: 34.7 G/DL (ref 31–36)
MCHC RBC AUTO-ENTMCNC: 34.8 G/DL (ref 31–36)
MCV RBC AUTO: 81.2 FL (ref 80–100)
MCV RBC AUTO: 82.1 FL (ref 80–100)
MCV RBC AUTO: 82.3 FL (ref 80–100)
MCV RBC AUTO: 83.2 FL (ref 80–100)
MCV RBC AUTO: 84.4 FL (ref 80–100)
MCV RBC AUTO: 84.5 FL (ref 80–100)
MCV RBC AUTO: 84.8 FL (ref 80–100)
MCV RBC AUTO: 84.8 FL (ref 80–100)
MCV RBC AUTO: 85 FL (ref 80–100)
MCV RBC AUTO: 85.1 FL (ref 80–100)
MCV RBC AUTO: 85.2 FL (ref 80–100)
MCV RBC AUTO: 85.3 FL (ref 80–100)
MCV RBC AUTO: 85.3 FL (ref 80–100)
MCV RBC AUTO: 85.5 FL (ref 80–100)
MCV RBC AUTO: 85.7 FL (ref 80–100)
MCV RBC AUTO: 85.7 FL (ref 80–100)
MCV RBC AUTO: 85.9 FL (ref 80–100)
MCV RBC AUTO: 85.9 FL (ref 80–100)
MCV RBC AUTO: 86.2 FL (ref 80–100)
MCV RBC AUTO: 86.4 FL (ref 80–100)
MCV RBC AUTO: 86.9 FL (ref 80–100)
MCV RBC AUTO: 87.3 FL (ref 80–100)
MCV RBC AUTO: 87.4 FL (ref 80–100)
MCV RBC AUTO: 87.5 FL (ref 80–100)
MCV RBC AUTO: 87.8 FL (ref 80–100)
MCV RBC AUTO: 87.8 FL (ref 80–100)
MCV RBC AUTO: 87.9 FL (ref 80–100)
MCV RBC AUTO: 88 FL (ref 80–100)
MCV RBC AUTO: 88 FL (ref 80–100)
MCV RBC AUTO: 88.1 FL (ref 80–100)
MCV RBC AUTO: 88.3 FL (ref 80–100)
MCV RBC AUTO: 88.3 FL (ref 80–100)
MCV RBC AUTO: 88.7 FL (ref 80–100)
MCV RBC AUTO: 88.9 FL (ref 80–100)
MCV RBC AUTO: 89 FL (ref 80–100)
MCV RBC AUTO: 89.6 FL (ref 80–100)
MCV RBC AUTO: 89.7 FL (ref 80–100)
MCV RBC AUTO: 91.1 FL (ref 80–100)
MCV RBC AUTO: 91.1 FL (ref 80–100)
MCV RBC AUTO: 91.2 FL (ref 80–100)
MCV RBC AUTO: 91.9 FL (ref 80–100)
MCV RBC AUTO: 92 FL (ref 80–100)
MEP: NORMAL
METHADONE SCREEN, URINE: NORMAL
METHADONE SCREEN, URINE: NORMAL
METHEMOGLOBIN ARTERIAL: 0.1 % (ref 0–1.4)
METHEMOGLOBIN ARTERIAL: 0.3 %
METHEMOGLOBIN ARTERIAL: 0.3 %
METHEMOGLOBIN ARTERIAL: 0.5 %
METHEMOGLOBIN ARTERIAL: 0.5 %
METHEMOGLOBIN VENOUS: 0.3 %
METHEMOGLOBIN VENOUS: 0.5 %
MICROSCOPIC EXAMINATION: ABNORMAL
MICROSCOPIC EXAMINATION: NORMAL
MICROSCOPIC EXAMINATION: YES
MICROSCOPIC EXAMINATION: YES
MIP: NORMAL
MONOCYTES ABSOLUTE: 0.2 K/UL (ref 0–1.3)
MONOCYTES ABSOLUTE: 0.3 K/UL (ref 0–1.3)
MONOCYTES ABSOLUTE: 0.4 K/UL (ref 0–1.3)
MONOCYTES ABSOLUTE: 0.5 K/UL (ref 0–1.3)
MONOCYTES ABSOLUTE: 0.6 K/UL (ref 0–1.3)
MONOCYTES ABSOLUTE: 0.7 K/UL (ref 0–1.3)
MONOCYTES ABSOLUTE: 0.8 K/UL (ref 0–1.3)
MONOCYTES ABSOLUTE: 0.8 K/UL (ref 0–1.3)
MONOCYTES ABSOLUTE: 0.9 K/UL (ref 0–1.3)
MONOCYTES ABSOLUTE: 0.9 K/UL (ref 0–1.3)
MONOCYTES ABSOLUTE: 1.3 K/UL (ref 0–1.3)
MONOCYTES RELATIVE PERCENT: 10 %
MONOCYTES RELATIVE PERCENT: 10.3 %
MONOCYTES RELATIVE PERCENT: 10.4 %
MONOCYTES RELATIVE PERCENT: 4 %
MONOCYTES RELATIVE PERCENT: 4.9 %
MONOCYTES RELATIVE PERCENT: 5 %
MONOCYTES RELATIVE PERCENT: 5 %
MONOCYTES RELATIVE PERCENT: 5.1 %
MONOCYTES RELATIVE PERCENT: 5.2 %
MONOCYTES RELATIVE PERCENT: 5.4 %
MONOCYTES RELATIVE PERCENT: 5.4 %
MONOCYTES RELATIVE PERCENT: 5.6 %
MONOCYTES RELATIVE PERCENT: 5.8 %
MONOCYTES RELATIVE PERCENT: 5.9 %
MONOCYTES RELATIVE PERCENT: 6 %
MONOCYTES RELATIVE PERCENT: 6.1 %
MONOCYTES RELATIVE PERCENT: 6.1 %
MONOCYTES RELATIVE PERCENT: 6.2 %
MONOCYTES RELATIVE PERCENT: 6.3 %
MONOCYTES RELATIVE PERCENT: 6.7 %
MONOCYTES RELATIVE PERCENT: 7.1 %
MONOCYTES RELATIVE PERCENT: 7.3 %
MONOCYTES RELATIVE PERCENT: 7.4 %
MONOCYTES RELATIVE PERCENT: 7.6 %
MONOCYTES RELATIVE PERCENT: 8.9 %
MONOCYTES RELATIVE PERCENT: 8.9 %
MONOCYTES RELATIVE PERCENT: 9.1 %
MONOCYTES RELATIVE PERCENT: 9.4 %
MONOCYTES RELATIVE PERCENT: 9.6 %
MONOCYTES RELATIVE PERCENT: 9.7 %
MUCUS: ABNORMAL /LPF
MVV %PRED-PRE: NORMAL
MVV PRED: NORMAL
MVV-PRE: NORMAL
NEUTROPHILS ABSOLUTE: 10.1 K/UL (ref 1.7–7.7)
NEUTROPHILS ABSOLUTE: 12.7 K/UL (ref 1.7–7.7)
NEUTROPHILS ABSOLUTE: 13.1 K/UL (ref 1.7–7.7)
NEUTROPHILS ABSOLUTE: 15.6 K/UL (ref 1.7–7.7)
NEUTROPHILS ABSOLUTE: 4 K/UL (ref 1.7–7.7)
NEUTROPHILS ABSOLUTE: 4.6 K/UL (ref 1.7–7.7)
NEUTROPHILS ABSOLUTE: 4.6 K/UL (ref 1.7–7.7)
NEUTROPHILS ABSOLUTE: 4.7 K/UL (ref 1.7–7.7)
NEUTROPHILS ABSOLUTE: 4.9 K/UL (ref 1.7–7.7)
NEUTROPHILS ABSOLUTE: 4.9 K/UL (ref 1.7–7.7)
NEUTROPHILS ABSOLUTE: 5.3 K/UL (ref 1.7–7.7)
NEUTROPHILS ABSOLUTE: 5.7 K/UL (ref 1.7–7.7)
NEUTROPHILS ABSOLUTE: 5.8 K/UL (ref 1.7–7.7)
NEUTROPHILS ABSOLUTE: 6 K/UL (ref 1.7–7.7)
NEUTROPHILS ABSOLUTE: 6 K/UL (ref 1.7–7.7)
NEUTROPHILS ABSOLUTE: 6.1 K/UL (ref 1.7–7.7)
NEUTROPHILS ABSOLUTE: 6.3 K/UL (ref 1.7–7.7)
NEUTROPHILS ABSOLUTE: 6.3 K/UL (ref 1.7–7.7)
NEUTROPHILS ABSOLUTE: 6.4 K/UL (ref 1.7–7.7)
NEUTROPHILS ABSOLUTE: 6.6 K/UL (ref 1.7–7.7)
NEUTROPHILS ABSOLUTE: 6.7 K/UL (ref 1.7–7.7)
NEUTROPHILS ABSOLUTE: 7.2 K/UL (ref 1.7–7.7)
NEUTROPHILS ABSOLUTE: 7.3 K/UL (ref 1.7–7.7)
NEUTROPHILS ABSOLUTE: 7.5 K/UL (ref 1.7–7.7)
NEUTROPHILS ABSOLUTE: 7.5 K/UL (ref 1.7–7.7)
NEUTROPHILS ABSOLUTE: 7.8 K/UL (ref 1.7–7.7)
NEUTROPHILS ABSOLUTE: 8.3 K/UL (ref 1.7–7.7)
NEUTROPHILS ABSOLUTE: 8.3 K/UL (ref 1.7–7.7)
NEUTROPHILS RELATIVE PERCENT: 73.6 %
NEUTROPHILS RELATIVE PERCENT: 74.4 %
NEUTROPHILS RELATIVE PERCENT: 77.5 %
NEUTROPHILS RELATIVE PERCENT: 78.3 %
NEUTROPHILS RELATIVE PERCENT: 78.7 %
NEUTROPHILS RELATIVE PERCENT: 79 %
NEUTROPHILS RELATIVE PERCENT: 80.1 %
NEUTROPHILS RELATIVE PERCENT: 80.2 %
NEUTROPHILS RELATIVE PERCENT: 80.3 %
NEUTROPHILS RELATIVE PERCENT: 80.3 %
NEUTROPHILS RELATIVE PERCENT: 80.6 %
NEUTROPHILS RELATIVE PERCENT: 81 %
NEUTROPHILS RELATIVE PERCENT: 81.4 %
NEUTROPHILS RELATIVE PERCENT: 81.4 %
NEUTROPHILS RELATIVE PERCENT: 81.5 %
NEUTROPHILS RELATIVE PERCENT: 81.9 %
NEUTROPHILS RELATIVE PERCENT: 82 %
NEUTROPHILS RELATIVE PERCENT: 82.3 %
NEUTROPHILS RELATIVE PERCENT: 82.9 %
NEUTROPHILS RELATIVE PERCENT: 83.3 %
NEUTROPHILS RELATIVE PERCENT: 83.8 %
NEUTROPHILS RELATIVE PERCENT: 84.1 %
NEUTROPHILS RELATIVE PERCENT: 84.2 %
NEUTROPHILS RELATIVE PERCENT: 84.5 %
NEUTROPHILS RELATIVE PERCENT: 85.7 %
NEUTROPHILS RELATIVE PERCENT: 86.1 %
NEUTROPHILS RELATIVE PERCENT: 86.3 %
NEUTROPHILS RELATIVE PERCENT: 86.8 %
NEUTROPHILS RELATIVE PERCENT: 87.2 %
NEUTROPHILS RELATIVE PERCENT: 88 %
NITRITE, URINE: NEGATIVE
NUCLEATED RED BLOOD CELLS: 3 /100 WBC
O2 CONTENT, VEN: 12 VOL %
O2 CONTENT, VEN: 14 VOL %
O2 CONTENT, VEN: 16 VOL %
O2 CONTENT, VEN: 18 VOL %
O2 SAT, ARTERIAL: 92.4 %
O2 SAT, ARTERIAL: 95.1 %
O2 SAT, ARTERIAL: 96 % (ref 93–100)
O2 SAT, ARTERIAL: 97.9 %
O2 SAT, ARTERIAL: 98.2 %
O2 SAT, VEN: 74 %
O2 SAT, VEN: 82 %
O2 SAT, VEN: 86 %
O2 SAT, VEN: 91 %
O2 SAT, VEN: 96 %
O2 THERAPY: ABNORMAL
OPIATE SCREEN URINE: NORMAL
OPIATE SCREEN URINE: NORMAL
ORGANISM: ABNORMAL
OSMOLALITY URINE: 236 MOSM/KG (ref 390–1070)
OSMOLALITY URINE: 327 MOSM/KG (ref 390–1070)
OSMOLALITY URINE: 343 MOSM/KG (ref 390–1070)
OSMOLALITY: 289 MOSM/KG (ref 278–305)
OSMOLALITY: 292 MOSM/KG (ref 278–305)
OVALOCYTES: ABNORMAL
OXYCODONE URINE: NORMAL
OXYCODONE URINE: NORMAL
PCO2 ARTERIAL: 25.7 MMHG (ref 35–45)
PCO2 ARTERIAL: 32 MMHG (ref 35–45)
PCO2 ARTERIAL: 43.8 MMHG (ref 35–45)
PCO2 ARTERIAL: 51.5 MMHG (ref 35–45)
PCO2 ARTERIAL: 51.7 MMHG (ref 35–45)
PCO2, VEN: 31.5 MMHG (ref 40–50)
PCO2, VEN: 43.7 MMHG (ref 40–50)
PCO2, VEN: 48.4 MMHG (ref 40–50)
PCO2, VEN: 50.2 MMHG (ref 40–50)
PCO2, VEN: 58.3 MMHG (ref 40–50)
PDW BLD-RTO: 14.1 % (ref 12.4–15.4)
PDW BLD-RTO: 14.2 % (ref 12.4–15.4)
PDW BLD-RTO: 14.7 % (ref 12.4–15.4)
PDW BLD-RTO: 14.7 % (ref 12.4–15.4)
PDW BLD-RTO: 15.5 % (ref 12.4–15.4)
PDW BLD-RTO: 15.8 % (ref 12.4–15.4)
PDW BLD-RTO: 15.9 % (ref 12.4–15.4)
PDW BLD-RTO: 16.2 % (ref 12.4–15.4)
PDW BLD-RTO: 16.2 % (ref 12.4–15.4)
PDW BLD-RTO: 16.3 % (ref 12.4–15.4)
PDW BLD-RTO: 16.3 % (ref 12.4–15.4)
PDW BLD-RTO: 16.4 % (ref 12.4–15.4)
PDW BLD-RTO: 16.5 % (ref 12.4–15.4)
PDW BLD-RTO: 16.6 % (ref 12.4–15.4)
PDW BLD-RTO: 16.7 % (ref 12.4–15.4)
PDW BLD-RTO: 16.7 % (ref 12.4–15.4)
PDW BLD-RTO: 16.8 % (ref 12.4–15.4)
PDW BLD-RTO: 16.9 % (ref 12.4–15.4)
PDW BLD-RTO: 17 % (ref 12.4–15.4)
PDW BLD-RTO: 17.1 % (ref 12.4–15.4)
PDW BLD-RTO: 17.1 % (ref 12.4–15.4)
PDW BLD-RTO: 17.4 % (ref 12.4–15.4)
PDW BLD-RTO: 17.4 % (ref 12.4–15.4)
PDW BLD-RTO: 17.6 % (ref 12.4–15.4)
PDW BLD-RTO: 17.6 % (ref 12.4–15.4)
PDW BLD-RTO: 17.7 % (ref 12.4–15.4)
PDW BLD-RTO: 17.9 % (ref 12.4–15.4)
PDW BLD-RTO: 18 % (ref 12.4–15.4)
PDW BLD-RTO: 18 % (ref 12.4–15.4)
PDW BLD-RTO: 18.2 % (ref 12.4–15.4)
PDW BLD-RTO: 18.3 % (ref 12.4–15.4)
PDW BLD-RTO: 18.7 % (ref 12.4–15.4)
PDW BLD-RTO: 25.5 % (ref 12.4–15.4)
PDW BLD-RTO: 26.9 % (ref 12.4–15.4)
PEF %PRED-POST: NORMAL
PEF %PRED-POST: NORMAL
PEF %PRED-PRE: NORMAL
PEF %PRED-PRE: NORMAL
PEF PRED: NORMAL
PEF PRED: NORMAL
PEF%CHNG: NORMAL
PEF%CHNG: NORMAL
PEF-POST: NORMAL
PEF-POST: NORMAL
PEF-PRE: NORMAL
PEF-PRE: NORMAL
PERFORMED ON: ABNORMAL
PERFORMED ON: NORMAL
PH ARTERIAL: 7.39 (ref 7.35–7.45)
PH ARTERIAL: 7.4 (ref 7.35–7.45)
PH ARTERIAL: 7.47 (ref 7.35–7.45)
PH ARTERIAL: 7.52 (ref 7.35–7.45)
PH ARTERIAL: 7.53 (ref 7.35–7.45)
PH UA: 5
PH UA: 5 (ref 5–8)
PH UA: 5.5 (ref 5–8)
PH UA: 5.5 (ref 5–8)
PH UA: 6
PH UA: 6 (ref 5–8)
PH UA: 6 (ref 5–8)
PH UA: 7 (ref 5–8)
PH VENOUS: 7.34 (ref 7.35–7.45)
PH VENOUS: 7.39 (ref 7.35–7.45)
PH VENOUS: 7.42 (ref 7.35–7.45)
PH VENOUS: 7.43 (ref 7.35–7.45)
PH VENOUS: 7.49 (ref 7.35–7.45)
PHENCYCLIDINE SCREEN URINE: NORMAL
PHENCYCLIDINE SCREEN URINE: NORMAL
PHOSPHORUS: 3.4 MG/DL (ref 2.5–4.9)
PHOSPHORUS: 3.9 MG/DL (ref 2.5–4.9)
PHOSPHORUS: 4 MG/DL (ref 2.5–4.9)
PHOSPHORUS: 4.1 MG/DL (ref 2.5–4.9)
PHOSPHORUS: 4.1 MG/DL (ref 2.5–4.9)
PHOSPHORUS: 4.2 MG/DL (ref 2.5–4.9)
PHOSPHORUS: 4.3 MG/DL (ref 2.5–4.9)
PHOSPHORUS: 4.4 MG/DL (ref 2.5–4.9)
PHOSPHORUS: 5.4 MG/DL (ref 2.5–4.9)
PHOSPHORUS: 6.7 MG/DL (ref 2.5–4.9)
PHOSPHORUS: 8.4 MG/DL (ref 2.5–4.9)
PLATELET # BLD: 179 K/UL (ref 135–450)
PLATELET # BLD: 197 K/UL (ref 135–450)
PLATELET # BLD: 200 K/UL (ref 135–450)
PLATELET # BLD: 219 K/UL (ref 135–450)
PLATELET # BLD: 220 K/UL (ref 135–450)
PLATELET # BLD: 220 K/UL (ref 135–450)
PLATELET # BLD: 226 K/UL (ref 135–450)
PLATELET # BLD: 228 K/UL (ref 135–450)
PLATELET # BLD: 231 K/UL (ref 135–450)
PLATELET # BLD: 235 K/UL (ref 135–450)
PLATELET # BLD: 238 K/UL (ref 135–450)
PLATELET # BLD: 239 K/UL (ref 135–450)
PLATELET # BLD: 239 K/UL (ref 135–450)
PLATELET # BLD: 242 K/UL (ref 135–450)
PLATELET # BLD: 246 K/UL (ref 135–450)
PLATELET # BLD: 247 K/UL (ref 135–450)
PLATELET # BLD: 248 K/UL (ref 135–450)
PLATELET # BLD: 253 K/UL (ref 135–450)
PLATELET # BLD: 257 K/UL (ref 135–450)
PLATELET # BLD: 259 K/UL (ref 135–450)
PLATELET # BLD: 261 K/UL (ref 135–450)
PLATELET # BLD: 261 K/UL (ref 135–450)
PLATELET # BLD: 265 K/UL (ref 135–450)
PLATELET # BLD: 266 K/UL (ref 135–450)
PLATELET # BLD: 266 K/UL (ref 135–450)
PLATELET # BLD: 269 K/UL (ref 135–450)
PLATELET # BLD: 278 K/UL (ref 135–450)
PLATELET # BLD: 284 K/UL (ref 135–450)
PLATELET # BLD: 285 K/UL (ref 135–450)
PLATELET # BLD: 288 K/UL (ref 135–450)
PLATELET # BLD: 295 K/UL (ref 135–450)
PLATELET # BLD: 305 K/UL (ref 135–450)
PLATELET # BLD: 310 K/UL (ref 135–450)
PLATELET # BLD: 312 K/UL (ref 135–450)
PLATELET # BLD: 319 K/UL (ref 135–450)
PLATELET # BLD: 330 K/UL (ref 135–450)
PLATELET # BLD: 342 K/UL (ref 135–450)
PLATELET # BLD: 401 K/UL (ref 135–450)
PLATELET # BLD: 408 K/UL (ref 135–450)
PLATELET # BLD: 425 K/UL (ref 135–450)
PLATELET SLIDE REVIEW: ADEQUATE
PLATELET SLIDE REVIEW: ADEQUATE
PMV BLD AUTO: 10 FL (ref 5–10.5)
PMV BLD AUTO: 8.2 FL (ref 5–10.5)
PMV BLD AUTO: 8.3 FL (ref 5–10.5)
PMV BLD AUTO: 8.3 FL (ref 5–10.5)
PMV BLD AUTO: 8.4 FL (ref 5–10.5)
PMV BLD AUTO: 8.5 FL (ref 5–10.5)
PMV BLD AUTO: 8.7 FL (ref 5–10.5)
PMV BLD AUTO: 8.8 FL (ref 5–10.5)
PMV BLD AUTO: 8.9 FL (ref 5–10.5)
PMV BLD AUTO: 9 FL (ref 5–10.5)
PMV BLD AUTO: 9.1 FL (ref 5–10.5)
PMV BLD AUTO: 9.1 FL (ref 5–10.5)
PMV BLD AUTO: 9.2 FL (ref 5–10.5)
PMV BLD AUTO: 9.3 FL (ref 5–10.5)
PMV BLD AUTO: 9.3 FL (ref 5–10.5)
PMV BLD AUTO: 9.4 FL (ref 5–10.5)
PMV BLD AUTO: 9.5 FL (ref 5–10.5)
PMV BLD AUTO: 9.5 FL (ref 5–10.5)
PMV BLD AUTO: 9.6 FL (ref 5–10.5)
PMV BLD AUTO: 9.7 FL (ref 5–10.5)
PMV BLD AUTO: 9.8 FL (ref 5–10.5)
PMV BLD AUTO: 9.9 FL (ref 5–10.5)
PO2 ARTERIAL: 106.4 MMHG (ref 75–108)
PO2 ARTERIAL: 113.2 MMHG (ref 75–108)
PO2 ARTERIAL: 67.2 MMHG (ref 75–108)
PO2 ARTERIAL: 76.8 MMHG (ref 75–108)
PO2 ARTERIAL: 78 MMHG (ref 75–108)
PO2, VEN: 38.6 MMHG (ref 25–40)
PO2, VEN: 46.7 MMHG (ref 25–40)
PO2, VEN: 52.9 MMHG (ref 25–40)
PO2, VEN: 56.8 MMHG (ref 25–40)
PO2, VEN: 80.8 MMHG (ref 25–40)
POIKILOCYTES: ABNORMAL
POLYCHROMASIA: ABNORMAL
POLYCHROMASIA: ABNORMAL
POTASSIUM REFLEX MAGNESIUM: 2.5 MMOL/L (ref 3.5–5.1)
POTASSIUM REFLEX MAGNESIUM: 2.8 MMOL/L (ref 3.5–5.1)
POTASSIUM REFLEX MAGNESIUM: 2.9 MMOL/L (ref 3.5–5.1)
POTASSIUM REFLEX MAGNESIUM: 2.9 MMOL/L (ref 3.5–5.1)
POTASSIUM REFLEX MAGNESIUM: 3 MMOL/L (ref 3.5–5.1)
POTASSIUM REFLEX MAGNESIUM: 3.1 MMOL/L (ref 3.5–5.1)
POTASSIUM REFLEX MAGNESIUM: 3.2 MMOL/L (ref 3.5–5.1)
POTASSIUM REFLEX MAGNESIUM: 3.2 MMOL/L (ref 3.5–5.1)
POTASSIUM REFLEX MAGNESIUM: 3.3 MMOL/L (ref 3.5–5.1)
POTASSIUM REFLEX MAGNESIUM: 3.4 MMOL/L (ref 3.5–5.1)
POTASSIUM REFLEX MAGNESIUM: 3.4 MMOL/L (ref 3.5–5.1)
POTASSIUM REFLEX MAGNESIUM: 3.5 MMOL/L (ref 3.5–5.1)
POTASSIUM REFLEX MAGNESIUM: 3.5 MMOL/L (ref 3.5–5.1)
POTASSIUM REFLEX MAGNESIUM: 3.6 MMOL/L (ref 3.5–5.1)
POTASSIUM REFLEX MAGNESIUM: 3.7 MMOL/L (ref 3.5–5.1)
POTASSIUM REFLEX MAGNESIUM: 3.7 MMOL/L (ref 3.5–5.1)
POTASSIUM REFLEX MAGNESIUM: 3.8 MMOL/L (ref 3.5–5.1)
POTASSIUM REFLEX MAGNESIUM: 3.8 MMOL/L (ref 3.5–5.1)
POTASSIUM REFLEX MAGNESIUM: 3.9 MMOL/L (ref 3.5–5.1)
POTASSIUM REFLEX MAGNESIUM: 4 MMOL/L (ref 3.5–5.1)
POTASSIUM REFLEX MAGNESIUM: 4.1 MMOL/L (ref 3.5–5.1)
POTASSIUM REFLEX MAGNESIUM: 4.2 MMOL/L (ref 3.5–5.1)
POTASSIUM REFLEX MAGNESIUM: 4.3 MMOL/L (ref 3.5–5.1)
POTASSIUM REFLEX MAGNESIUM: 4.3 MMOL/L (ref 3.5–5.1)
POTASSIUM REFLEX MAGNESIUM: 4.4 MMOL/L (ref 3.5–5.1)
POTASSIUM REFLEX MAGNESIUM: 4.5 MMOL/L (ref 3.5–5.1)
POTASSIUM REFLEX MAGNESIUM: 4.6 MMOL/L (ref 3.5–5.1)
POTASSIUM REFLEX MAGNESIUM: 4.7 MMOL/L (ref 3.5–5.1)
POTASSIUM REFLEX MAGNESIUM: 4.7 MMOL/L (ref 3.5–5.1)
POTASSIUM REFLEX MAGNESIUM: 4.8 MMOL/L (ref 3.5–5.1)
POTASSIUM REFLEX MAGNESIUM: 4.8 MMOL/L (ref 3.5–5.1)
POTASSIUM REFLEX MAGNESIUM: 5 MMOL/L (ref 3.5–5.1)
POTASSIUM REFLEX MAGNESIUM: 5 MMOL/L (ref 3.5–5.1)
POTASSIUM REFLEX MAGNESIUM: 5.1 MMOL/L (ref 3.5–5.1)
POTASSIUM REFLEX MAGNESIUM: 5.1 MMOL/L (ref 3.5–5.1)
POTASSIUM REFLEX MAGNESIUM: 5.3 MMOL/L (ref 3.5–5.1)
POTASSIUM REFLEX MAGNESIUM: 5.4 MMOL/L (ref 3.5–5.1)
POTASSIUM REFLEX MAGNESIUM: 5.5 MMOL/L (ref 3.5–5.1)
POTASSIUM REFLEX MAGNESIUM: 5.8 MMOL/L (ref 3.5–5.1)
POTASSIUM REFLEX MAGNESIUM: 5.9 MMOL/L (ref 3.5–5.1)
POTASSIUM SERPL-SCNC: 2.4 MMOL/L (ref 3.5–5.1)
POTASSIUM SERPL-SCNC: 2.5 MMOL/L (ref 3.5–5.1)
POTASSIUM SERPL-SCNC: 2.6 MMOL/L (ref 3.5–5.1)
POTASSIUM SERPL-SCNC: 2.8 MMOL/L (ref 3.5–5.1)
POTASSIUM SERPL-SCNC: 2.9 MMOL/L (ref 3.5–5.1)
POTASSIUM SERPL-SCNC: 3.1 MMOL/L (ref 3.5–5.1)
POTASSIUM SERPL-SCNC: 3.2 MMOL/L
POTASSIUM SERPL-SCNC: 3.2 MMOL/L (ref 3.5–5.1)
POTASSIUM SERPL-SCNC: 3.2 MMOL/L (ref 3.5–5.1)
POTASSIUM SERPL-SCNC: 3.3 MMOL/L (ref 3.5–5.1)
POTASSIUM SERPL-SCNC: 3.4 MMOL/L (ref 3.5–5.1)
POTASSIUM SERPL-SCNC: 3.4 MMOL/L (ref 3.5–5.1)
POTASSIUM SERPL-SCNC: 3.5 MMOL/L (ref 3.5–5.1)
POTASSIUM SERPL-SCNC: 3.8 MMOL/L (ref 3.5–5.1)
POTASSIUM SERPL-SCNC: 3.9 MMOL/L (ref 3.5–5.1)
POTASSIUM SERPL-SCNC: 3.9 MMOL/L (ref 3.5–5.1)
POTASSIUM SERPL-SCNC: 4.2 MMOL/L (ref 3.5–5.1)
POTASSIUM SERPL-SCNC: 4.2 MMOL/L (ref 3.5–5.1)
POTASSIUM SERPL-SCNC: 4.3 MMOL/L (ref 3.5–5.1)
POTASSIUM SERPL-SCNC: 4.4 MMOL/L (ref 3.5–5.1)
POTASSIUM SERPL-SCNC: 4.5 MMOL/L (ref 3.5–5.1)
POTASSIUM SERPL-SCNC: 4.5 MMOL/L (ref 3.5–5.1)
POTASSIUM SERPL-SCNC: 4.7 MMOL/L (ref 3.5–5.1)
POTASSIUM SERPL-SCNC: 4.8 MMOL/L (ref 3.5–5.1)
POTASSIUM SERPL-SCNC: 4.8 MMOL/L (ref 3.5–5.1)
POTASSIUM SERPL-SCNC: 5.1 MMOL/L (ref 3.5–5.1)
POTASSIUM SERPL-SCNC: 5.4 MMOL/L (ref 3.5–5.1)
POTASSIUM SERPL-SCNC: 6.1 MMOL/L (ref 3.5–5.1)
POTASSIUM, UR: 26.6 MMOL/L
POTASSIUM, UR: 30.7 MMOL/L
PREALBUMIN: 17.2 MG/DL (ref 20–40)
PRO-BNP: 1584 PG/ML (ref 0–124)
PRO-BNP: 2961 PG/ML (ref 0–124)
PRO-BNP: 3432 PG/ML (ref 0–124)
PRO-BNP: 3512 PG/ML (ref 0–124)
PRO-BNP: 4228 PG/ML (ref 0–124)
PRO-BNP: 4257 PG/ML (ref 0–124)
PRO-BNP: 4517 PG/ML (ref 0–124)
PRO-BNP: 4856 PG/ML (ref 0–124)
PRO-BNP: 7507 PG/ML (ref 0–124)
PRO-BNP: ABNORMAL PG/ML (ref 0–124)
PROCALCITONIN: 0.35 NG/ML (ref 0–0.15)
PROPOXYPHENE SCREEN: NORMAL
PROPOXYPHENE SCREEN: NORMAL
PROTEIN PROTEIN: 5.9 MG/DL
PROTEIN UA: 30 MG/DL
PROTEIN UA: NEGATIVE MG/DL
PROTHROMBIN TIME: 13.5 SEC (ref 10–13.2)
PROTHROMBIN TIME: 18.2 SEC (ref 9.9–12.7)
PROTHROMBIN TIME: 29.7 SEC (ref 9.9–12.7)
PROTHROMBIN TIME: 44.8 SEC (ref 9.9–12.7)
RAW %PRED: NORMAL
RAW PRED: NORMAL
RAW: NORMAL
RBC # BLD: 2.74 M/UL (ref 4–5.2)
RBC # BLD: 2.77 M/UL (ref 4–5.2)
RBC # BLD: 2.8 M/UL (ref 4–5.2)
RBC # BLD: 2.9 M/UL (ref 4–5.2)
RBC # BLD: 2.99 M/UL (ref 4–5.2)
RBC # BLD: 2.99 M/UL (ref 4–5.2)
RBC # BLD: 3.01 M/UL (ref 4–5.2)
RBC # BLD: 3.08 M/UL (ref 4–5.2)
RBC # BLD: 3.18 M/UL (ref 4–5.2)
RBC # BLD: 3.21 M/UL (ref 4–5.2)
RBC # BLD: 3.28 M/UL (ref 4–5.2)
RBC # BLD: 3.28 M/UL (ref 4–5.2)
RBC # BLD: 3.32 M/UL (ref 4–5.2)
RBC # BLD: 3.38 M/UL (ref 4–5.2)
RBC # BLD: 3.42 M/UL (ref 4–5.2)
RBC # BLD: 3.46 M/UL (ref 4–5.2)
RBC # BLD: 3.47 M/UL (ref 4–5.2)
RBC # BLD: 3.5 M/UL (ref 4–5.2)
RBC # BLD: 4.07 M/UL (ref 4–5.2)
RBC # BLD: 4.07 M/UL (ref 4–5.2)
RBC # BLD: 4.09 M/UL (ref 4–5.2)
RBC # BLD: 4.16 M/UL (ref 4–5.2)
RBC # BLD: 4.24 M/UL (ref 4–5.2)
RBC # BLD: 4.3 M/UL (ref 4–5.2)
RBC # BLD: 4.31 M/UL (ref 4–5.2)
RBC # BLD: 4.33 M/UL (ref 4–5.2)
RBC # BLD: 4.4 M/UL (ref 4–5.2)
RBC # BLD: 4.41 M/UL (ref 4–5.2)
RBC # BLD: 4.42 M/UL (ref 4–5.2)
RBC # BLD: 4.42 M/UL (ref 4–5.2)
RBC # BLD: 4.46 M/UL (ref 4–5.2)
RBC # BLD: 4.52 M/UL (ref 4–5.2)
RBC # BLD: 4.6 M/UL (ref 4–5.2)
RBC # BLD: 4.62 M/UL (ref 4–5.2)
RBC # BLD: 4.64 M/UL (ref 4–5.2)
RBC # BLD: 4.73 M/UL (ref 4–5.2)
RBC # BLD: 4.82 M/UL (ref 4–5.2)
RBC # BLD: 4.83 M/UL (ref 4–5.2)
RBC # BLD: 4.87 M/UL (ref 4–5.2)
RBC # BLD: 4.96 M/UL (ref 4–5.2)
RBC # BLD: 5.01 M/UL (ref 4–5.2)
RBC # BLD: 5.74 M/UL (ref 4–5.2)
RBC UA: ABNORMAL /HPF (ref 0–4)
REPORT: NORMAL
RV %PRED: NORMAL
RV PRED: NORMAL
RV: NORMAL
SARS-COV-2, NAAT: NOT DETECTED
SEDIMENTATION RATE, ERYTHROCYTE: 41 MM/HR (ref 0–30)
SEDIMENTATION RATE, ERYTHROCYTE: 46 MM/HR (ref 0–30)
SEDIMENTATION RATE, ERYTHROCYTE: 60 MM/HR (ref 0–30)
SEDIMENTATION RATE, ERYTHROCYTE: 73 MM/HR (ref 0–30)
SLIDE REVIEW: ABNORMAL
SLIDE REVIEW: ABNORMAL
SODIUM BLD-SCNC: 119 MMOL/L (ref 136–145)
SODIUM BLD-SCNC: 120 MMOL/L (ref 136–145)
SODIUM BLD-SCNC: 121 MMOL/L (ref 136–145)
SODIUM BLD-SCNC: 122 MMOL/L (ref 136–145)
SODIUM BLD-SCNC: 123 MMOL/L (ref 136–145)
SODIUM BLD-SCNC: 124 MMOL/L (ref 136–145)
SODIUM BLD-SCNC: 125 MMOL/L (ref 136–145)
SODIUM BLD-SCNC: 126 MMOL/L (ref 136–145)
SODIUM BLD-SCNC: 127 MMOL/L (ref 136–145)
SODIUM BLD-SCNC: 128 MMOL/L (ref 136–145)
SODIUM BLD-SCNC: 129 MMOL/L (ref 136–145)
SODIUM BLD-SCNC: 129 MMOL/L (ref 136–145)
SODIUM BLD-SCNC: 130 MMOL/L (ref 136–145)
SODIUM BLD-SCNC: 131 MMOL/L (ref 136–145)
SODIUM BLD-SCNC: 132 MMOL/L (ref 136–145)
SODIUM BLD-SCNC: 133 MMOL/L
SODIUM BLD-SCNC: 133 MMOL/L (ref 136–145)
SODIUM BLD-SCNC: 134 MMOL/L (ref 136–145)
SODIUM BLD-SCNC: 135 MMOL/L (ref 136–145)
SODIUM BLD-SCNC: 136 MMOL/L (ref 136–145)
SODIUM BLD-SCNC: 137 MMOL/L (ref 136–145)
SODIUM BLD-SCNC: 137 MMOL/L (ref 136–145)
SODIUM BLD-SCNC: 138 MMOL/L (ref 136–145)
SODIUM URINE: 34 MMOL/L
SODIUM URINE: <20 MMOL/L
SODIUM URINE: <20 MMOL/L
SPE/IFE INTERPRETATION: NORMAL
SPECIFIC GRAVITY UA: 1.01 (ref 1–1.03)
SPECIFIC GRAVITY UA: 1.02 (ref 1–1.03)
SVC %PRED: NORMAL
SVC PRED: NORMAL
SVC: NORMAL
TCO2 ARTERIAL: 16.4 MMOL/L
TCO2 ARTERIAL: 19.9 MMOL/L
TCO2 ARTERIAL: 36.6 MMOL/L
TCO2 ARTERIAL: 39 MMOL/L
TCO2 ARTERIAL: 43.9 MMOL/L
TCO2 CALC VENOUS: 18 MMOL/L
TCO2 CALC VENOUS: 27 MMOL/L
TCO2 CALC VENOUS: 34 MMOL/L
TCO2 CALC VENOUS: 37 MMOL/L
TCO2 CALC VENOUS: 38 MMOL/L
TLC %PRED: 68 %
TLC PRED: NORMAL
TLC: NORMAL
TOTAL CK: 86 U/L (ref 26–192)
TOTAL IRON BINDING CAPACITY: 317 UG/DL (ref 260–445)
TOTAL PROTEIN: 5.7 G/DL (ref 6.4–8.2)
TOTAL PROTEIN: 6.2 G/DL (ref 6.4–8.2)
TOTAL PROTEIN: 6.2 G/DL (ref 6.4–8.2)
TOTAL PROTEIN: 6.3 G/DL (ref 6.4–8.2)
TOTAL PROTEIN: 6.3 G/DL (ref 6.4–8.2)
TOTAL PROTEIN: 6.5 G/DL (ref 6.4–8.2)
TOTAL PROTEIN: 6.5 G/DL (ref 6.4–8.2)
TOTAL PROTEIN: 6.6 G/DL (ref 6.4–8.2)
TOTAL PROTEIN: 6.8 G/DL (ref 6.4–8.2)
TOTAL PROTEIN: 7 G/DL (ref 6.4–8.2)
TOTAL PROTEIN: 7.1 G/DL (ref 6.4–8.2)
TOTAL PROTEIN: 7.2 G/DL (ref 6.4–8.2)
TOTAL PROTEIN: 7.3 G/DL (ref 6.4–8.2)
TOTAL PROTEIN: 7.6 G/DL (ref 6.4–8.2)
TOTAL PROTEIN: 7.6 G/DL (ref 6.4–8.2)
TOTAL PROTEIN: 7.7 G/DL (ref 6.4–8.2)
TOTAL PROTEIN: 7.8 G/DL (ref 6.4–8.2)
TOTAL PROTEIN: 8.9 G/DL (ref 6.4–8.2)
TRIGL SERPL-MCNC: 123 MG/DL (ref 0–150)
TRIGL SERPL-MCNC: 51 MG/DL (ref 0–150)
TRIGL SERPL-MCNC: 83 MG/DL (ref 0–150)
TROPONIN: 0.01 NG/ML
TROPONIN: 0.02 NG/ML
TROPONIN: 0.03 NG/ML
TROPONIN: 0.04 NG/ML
TROPONIN: 0.05 NG/ML
TROPONIN: 0.06 NG/ML
TROPONIN: 0.07 NG/ML
TROPONIN: 0.07 NG/ML
TSH SERPL DL<=0.05 MIU/L-ACNC: 3.99 UIU/ML (ref 0.27–4.2)
TSH SERPL DL<=0.05 MIU/L-ACNC: 5.67 UIU/ML (ref 0.27–4.2)
UREA NITROGEN, UR: 563.6 MG/DL (ref 800–1666)
URIC ACID, SERUM: 16 MG/DL (ref 2.6–6)
URINE CULTURE, ROUTINE: ABNORMAL
URINE CULTURE, ROUTINE: ABNORMAL
URINE REFLEX TO CULTURE: ABNORMAL
URINE REFLEX TO CULTURE: NORMAL
URINE TYPE: ABNORMAL
URINE TYPE: NORMAL
UROBILINOGEN, URINE: 0.2 E.U./DL
UROBILINOGEN, URINE: 1 E.U./DL
UROBILINOGEN, URINE: 2 E.U./DL
UROBILINOGEN, URINE: 4 E.U./DL
VA %PRED: NORMAL
VA PRED: NORMAL
VA: NORMAL
VANCOMYCIN RANDOM: 11.1 UG/ML
VANCOMYCIN RANDOM: 11.9 UG/ML
VANCOMYCIN RANDOM: 12.1 UG/ML
VANCOMYCIN RANDOM: 12.7 UG/ML
VANCOMYCIN RANDOM: 14.6 UG/ML
VANCOMYCIN RANDOM: 18.7 UG/ML
VANCOMYCIN RANDOM: 21.5 UG/ML
VANCOMYCIN RANDOM: 9.3 UG/ML
VITAMIN D 25-HYDROXY: 13.5 NG/ML
VLDLC SERPL CALC-MCNC: 10 MG/DL
VLDLC SERPL CALC-MCNC: 25 MG/DL
VTG %PRED: NORMAL
VTG PRED: NORMAL
VTG: NORMAL
WBC # BLD: 10.5 K/UL (ref 4–11)
WBC # BLD: 11.5 K/UL (ref 4–11)
WBC # BLD: 15 K/UL (ref 4–11)
WBC # BLD: 15.1 K/UL (ref 4–11)
WBC # BLD: 15.3 K/UL (ref 4–11)
WBC # BLD: 18.7 K/UL (ref 4–11)
WBC # BLD: 5.4 K/UL (ref 4–11)
WBC # BLD: 5.8 K/UL (ref 4–11)
WBC # BLD: 6 K/UL (ref 4–11)
WBC # BLD: 6 K/UL (ref 4–11)
WBC # BLD: 6.1 K/UL (ref 4–11)
WBC # BLD: 6.2 K/UL (ref 4–11)
WBC # BLD: 6.7 K/UL (ref 4–11)
WBC # BLD: 6.9 K/UL (ref 4–11)
WBC # BLD: 6.9 K/UL (ref 4–11)
WBC # BLD: 7 K/UL (ref 4–11)
WBC # BLD: 7.1 K/UL (ref 4–11)
WBC # BLD: 7.2 K/UL (ref 4–11)
WBC # BLD: 7.3 K/UL (ref 4–11)
WBC # BLD: 7.5 K/UL (ref 4–11)
WBC # BLD: 7.6 K/UL (ref 4–11)
WBC # BLD: 7.7 K/UL (ref 4–11)
WBC # BLD: 7.8 K/UL (ref 4–11)
WBC # BLD: 7.9 K/UL (ref 4–11)
WBC # BLD: 8 K/UL (ref 4–11)
WBC # BLD: 8.1 K/UL (ref 4–11)
WBC # BLD: 8.1 K/UL (ref 4–11)
WBC # BLD: 8.3 K/UL (ref 4–11)
WBC # BLD: 8.5 K/UL (ref 4–11)
WBC # BLD: 8.7 K/UL (ref 4–11)
WBC # BLD: 8.9 K/UL (ref 4–11)
WBC # BLD: 9 K/UL (ref 4–11)
WBC # BLD: 9.1 K/UL (ref 4–11)
WBC # BLD: 9.2 K/UL (ref 4–11)
WBC # BLD: 9.2 K/UL (ref 4–11)
WBC # BLD: 9.6 K/UL (ref 4–11)
WBC # BLD: 9.6 K/UL (ref 4–11)
WBC # BLD: 9.9 K/UL (ref 4–11)
WBC UA: ABNORMAL /HPF (ref 0–5)

## 2021-01-01 PROCEDURE — 85025 COMPLETE CBC W/AUTO DIFF WBC: CPT

## 2021-01-01 PROCEDURE — 36556 INSERT NON-TUNNEL CV CATH: CPT

## 2021-01-01 PROCEDURE — 2580000003 HC RX 258: Performed by: INTERNAL MEDICINE

## 2021-01-01 PROCEDURE — 6370000000 HC RX 637 (ALT 250 FOR IP): Performed by: INTERNAL MEDICINE

## 2021-01-01 PROCEDURE — 82570 ASSAY OF URINE CREATININE: CPT

## 2021-01-01 PROCEDURE — 36600 WITHDRAWAL OF ARTERIAL BLOOD: CPT

## 2021-01-01 PROCEDURE — 96374 THER/PROPH/DIAG INJ IV PUSH: CPT

## 2021-01-01 PROCEDURE — 6360000002 HC RX W HCPCS: Performed by: INTERNAL MEDICINE

## 2021-01-01 PROCEDURE — 83605 ASSAY OF LACTIC ACID: CPT

## 2021-01-01 PROCEDURE — G0296 VISIT TO DETERM LDCT ELIG: HCPCS | Performed by: INTERNAL MEDICINE

## 2021-01-01 PROCEDURE — 71046 X-RAY EXAM CHEST 2 VIEWS: CPT

## 2021-01-01 PROCEDURE — 73630 X-RAY EXAM OF FOOT: CPT

## 2021-01-01 PROCEDURE — 97530 THERAPEUTIC ACTIVITIES: CPT

## 2021-01-01 PROCEDURE — 36415 COLL VENOUS BLD VENIPUNCTURE: CPT

## 2021-01-01 PROCEDURE — 97110 THERAPEUTIC EXERCISES: CPT

## 2021-01-01 PROCEDURE — C9113 INJ PANTOPRAZOLE SODIUM, VIA: HCPCS | Performed by: INTERNAL MEDICINE

## 2021-01-01 PROCEDURE — 71045 X-RAY EXAM CHEST 1 VIEW: CPT

## 2021-01-01 PROCEDURE — 2580000003 HC RX 258: Performed by: HOSPITALIST

## 2021-01-01 PROCEDURE — 2060000000 HC ICU INTERMEDIATE R&B

## 2021-01-01 PROCEDURE — 80048 BASIC METABOLIC PNL TOTAL CA: CPT

## 2021-01-01 PROCEDURE — P9047 ALBUMIN (HUMAN), 25%, 50ML: HCPCS | Performed by: INTERNAL MEDICINE

## 2021-01-01 PROCEDURE — 83930 ASSAY OF BLOOD OSMOLALITY: CPT

## 2021-01-01 PROCEDURE — 84484 ASSAY OF TROPONIN QUANT: CPT

## 2021-01-01 PROCEDURE — 80069 RENAL FUNCTION PANEL: CPT

## 2021-01-01 PROCEDURE — 94640 AIRWAY INHALATION TREATMENT: CPT

## 2021-01-01 PROCEDURE — 99291 CRITICAL CARE FIRST HOUR: CPT | Performed by: INTERNAL MEDICINE

## 2021-01-01 PROCEDURE — 85610 PROTHROMBIN TIME: CPT

## 2021-01-01 PROCEDURE — 93010 ELECTROCARDIOGRAM REPORT: CPT | Performed by: INTERNAL MEDICINE

## 2021-01-01 PROCEDURE — 6370000000 HC RX 637 (ALT 250 FOR IP): Performed by: PHYSICIAN ASSISTANT

## 2021-01-01 PROCEDURE — 6360000002 HC RX W HCPCS: Performed by: HOSPITALIST

## 2021-01-01 PROCEDURE — 6370000000 HC RX 637 (ALT 250 FOR IP): Performed by: FAMILY MEDICINE

## 2021-01-01 PROCEDURE — 99252 IP/OBS CONSLTJ NEW/EST SF 35: CPT | Performed by: PHYSICIAN ASSISTANT

## 2021-01-01 PROCEDURE — 87077 CULTURE AEROBIC IDENTIFY: CPT

## 2021-01-01 PROCEDURE — 1200000000 HC SEMI PRIVATE

## 2021-01-01 PROCEDURE — 90935 HEMODIALYSIS ONE EVALUATION: CPT

## 2021-01-01 PROCEDURE — 85018 HEMOGLOBIN: CPT

## 2021-01-01 PROCEDURE — 99232 SBSQ HOSP IP/OBS MODERATE 35: CPT | Performed by: NURSE PRACTITIONER

## 2021-01-01 PROCEDURE — 82077 ASSAY SPEC XCP UR&BREATH IA: CPT

## 2021-01-01 PROCEDURE — 99213 OFFICE O/P EST LOW 20 MIN: CPT | Performed by: SURGERY

## 2021-01-01 PROCEDURE — 99232 SBSQ HOSP IP/OBS MODERATE 35: CPT | Performed by: INTERNAL MEDICINE

## 2021-01-01 PROCEDURE — 6370000000 HC RX 637 (ALT 250 FOR IP): Performed by: NURSE PRACTITIONER

## 2021-01-01 PROCEDURE — 76770 US EXAM ABDO BACK WALL COMP: CPT

## 2021-01-01 PROCEDURE — 80202 ASSAY OF VANCOMYCIN: CPT

## 2021-01-01 PROCEDURE — 93306 TTE W/DOPPLER COMPLETE: CPT

## 2021-01-01 PROCEDURE — 93264 REM MNTR WRLS P-ART PRS SNR: CPT | Performed by: NURSE PRACTITIONER

## 2021-01-01 PROCEDURE — P9612 CATHETERIZE FOR URINE SPEC: HCPCS

## 2021-01-01 PROCEDURE — 83735 ASSAY OF MAGNESIUM: CPT

## 2021-01-01 PROCEDURE — 76999 ECHO EXAMINATION PROCEDURE: CPT

## 2021-01-01 PROCEDURE — 99233 SBSQ HOSP IP/OBS HIGH 50: CPT | Performed by: INTERNAL MEDICINE

## 2021-01-01 PROCEDURE — 94761 N-INVAS EAR/PLS OXIMETRY MLT: CPT

## 2021-01-01 PROCEDURE — 97161 PT EVAL LOW COMPLEX 20 MIN: CPT

## 2021-01-01 PROCEDURE — 93005 ELECTROCARDIOGRAM TRACING: CPT | Performed by: STUDENT IN AN ORGANIZED HEALTH CARE EDUCATION/TRAINING PROGRAM

## 2021-01-01 PROCEDURE — 87635 SARS-COV-2 COVID-19 AMP PRB: CPT

## 2021-01-01 PROCEDURE — 84300 ASSAY OF URINE SODIUM: CPT

## 2021-01-01 PROCEDURE — 86140 C-REACTIVE PROTEIN: CPT

## 2021-01-01 PROCEDURE — 4004F PT TOBACCO SCREEN RCVD TLK: CPT | Performed by: NURSE PRACTITIONER

## 2021-01-01 PROCEDURE — 86900 BLOOD TYPING SEROLOGIC ABO: CPT

## 2021-01-01 PROCEDURE — 02HV33Z INSERTION OF INFUSION DEVICE INTO SUPERIOR VENA CAVA, PERCUTANEOUS APPROACH: ICD-10-PCS | Performed by: EMERGENCY MEDICINE

## 2021-01-01 PROCEDURE — 99285 EMERGENCY DEPT VISIT HI MDM: CPT

## 2021-01-01 PROCEDURE — G8427 DOCREV CUR MEDS BY ELIG CLIN: HCPCS | Performed by: NURSE PRACTITIONER

## 2021-01-01 PROCEDURE — 97116 GAIT TRAINING THERAPY: CPT

## 2021-01-01 PROCEDURE — 85652 RBC SED RATE AUTOMATED: CPT

## 2021-01-01 PROCEDURE — 51702 INSERT TEMP BLADDER CATH: CPT

## 2021-01-01 PROCEDURE — 83880 ASSAY OF NATRIURETIC PEPTIDE: CPT

## 2021-01-01 PROCEDURE — 82803 BLOOD GASES ANY COMBINATION: CPT

## 2021-01-01 PROCEDURE — 97535 SELF CARE MNGMENT TRAINING: CPT

## 2021-01-01 PROCEDURE — 2700000000 HC OXYGEN THERAPY PER DAY

## 2021-01-01 PROCEDURE — 85730 THROMBOPLASTIN TIME PARTIAL: CPT

## 2021-01-01 PROCEDURE — 6370000000 HC RX 637 (ALT 250 FOR IP): Performed by: HOSPITALIST

## 2021-01-01 PROCEDURE — 36592 COLLECT BLOOD FROM PICC: CPT

## 2021-01-01 PROCEDURE — 99239 HOSP IP/OBS DSCHRG MGMT >30: CPT | Performed by: INTERNAL MEDICINE

## 2021-01-01 PROCEDURE — 77001 FLUOROGUIDE FOR VEIN DEVICE: CPT

## 2021-01-01 PROCEDURE — 3023F SPIROM DOC REV: CPT | Performed by: INTERNAL MEDICINE

## 2021-01-01 PROCEDURE — 96366 THER/PROPH/DIAG IV INF ADDON: CPT

## 2021-01-01 PROCEDURE — 87150 DNA/RNA AMPLIFIED PROBE: CPT

## 2021-01-01 PROCEDURE — 83935 ASSAY OF URINE OSMOLALITY: CPT

## 2021-01-01 PROCEDURE — 74018 RADEX ABDOMEN 1 VIEW: CPT

## 2021-01-01 PROCEDURE — 86706 HEP B SURFACE ANTIBODY: CPT

## 2021-01-01 PROCEDURE — 2500000003 HC RX 250 WO HCPCS: Performed by: NURSE PRACTITIONER

## 2021-01-01 PROCEDURE — 99214 OFFICE O/P EST MOD 30 MIN: CPT | Performed by: INTERNAL MEDICINE

## 2021-01-01 PROCEDURE — 80053 COMPREHEN METABOLIC PANEL: CPT

## 2021-01-01 PROCEDURE — 3017F COLORECTAL CA SCREEN DOC REV: CPT | Performed by: INTERNAL MEDICINE

## 2021-01-01 PROCEDURE — 2580000003 HC RX 258: Performed by: EMERGENCY MEDICINE

## 2021-01-01 PROCEDURE — 2580000003 HC RX 258: Performed by: NURSE PRACTITIONER

## 2021-01-01 PROCEDURE — 6360000004 HC RX CONTRAST MEDICATION: Performed by: HOSPITALIST

## 2021-01-01 PROCEDURE — 84165 PROTEIN E-PHORESIS SERUM: CPT

## 2021-01-01 PROCEDURE — 73560 X-RAY EXAM OF KNEE 1 OR 2: CPT

## 2021-01-01 PROCEDURE — 6360000002 HC RX W HCPCS: Performed by: NURSE PRACTITIONER

## 2021-01-01 PROCEDURE — 94726 PLETHYSMOGRAPHY LUNG VOLUMES: CPT

## 2021-01-01 PROCEDURE — 85027 COMPLETE CBC AUTOMATED: CPT

## 2021-01-01 PROCEDURE — 84100 ASSAY OF PHOSPHORUS: CPT

## 2021-01-01 PROCEDURE — 6370000000 HC RX 637 (ALT 250 FOR IP): Performed by: EMERGENCY MEDICINE

## 2021-01-01 PROCEDURE — 85520 HEPARIN ASSAY: CPT

## 2021-01-01 PROCEDURE — 2500000003 HC RX 250 WO HCPCS: Performed by: EMERGENCY MEDICINE

## 2021-01-01 PROCEDURE — 80061 LIPID PANEL: CPT

## 2021-01-01 PROCEDURE — 84133 ASSAY OF URINE POTASSIUM: CPT

## 2021-01-01 PROCEDURE — 84132 ASSAY OF SERUM POTASSIUM: CPT

## 2021-01-01 PROCEDURE — 1111F DSCHRG MED/CURRENT MED MERGE: CPT | Performed by: NURSE PRACTITIONER

## 2021-01-01 PROCEDURE — 94060 EVALUATION OF WHEEZING: CPT

## 2021-01-01 PROCEDURE — 6360000002 HC RX W HCPCS: Performed by: STUDENT IN AN ORGANIZED HEALTH CARE EDUCATION/TRAINING PROGRAM

## 2021-01-01 PROCEDURE — C1881 DIALYSIS ACCESS SYSTEM: HCPCS

## 2021-01-01 PROCEDURE — 99284 EMERGENCY DEPT VISIT MOD MDM: CPT

## 2021-01-01 PROCEDURE — 99211 OFF/OP EST MAY X REQ PHY/QHP: CPT

## 2021-01-01 PROCEDURE — 97166 OT EVAL MOD COMPLEX 45 MIN: CPT

## 2021-01-01 PROCEDURE — G8417 CALC BMI ABV UP PARAM F/U: HCPCS | Performed by: NURSE PRACTITIONER

## 2021-01-01 PROCEDURE — 73030 X-RAY EXAM OF SHOULDER: CPT

## 2021-01-01 PROCEDURE — 73590 X-RAY EXAM OF LOWER LEG: CPT

## 2021-01-01 PROCEDURE — 87086 URINE CULTURE/COLONY COUNT: CPT

## 2021-01-01 PROCEDURE — 81001 URINALYSIS AUTO W/SCOPE: CPT

## 2021-01-01 PROCEDURE — 84156 ASSAY OF PROTEIN URINE: CPT

## 2021-01-01 PROCEDURE — 84478 ASSAY OF TRIGLYCERIDES: CPT

## 2021-01-01 PROCEDURE — 86704 HEP B CORE ANTIBODY TOTAL: CPT

## 2021-01-01 PROCEDURE — 99223 1ST HOSP IP/OBS HIGH 75: CPT | Performed by: INTERNAL MEDICINE

## 2021-01-01 PROCEDURE — 76705 ECHO EXAM OF ABDOMEN: CPT

## 2021-01-01 PROCEDURE — G8427 DOCREV CUR MEDS BY ELIG CLIN: HCPCS | Performed by: INTERNAL MEDICINE

## 2021-01-01 PROCEDURE — 84540 ASSAY OF URINE/UREA-N: CPT

## 2021-01-01 PROCEDURE — 96375 TX/PRO/DX INJ NEW DRUG ADDON: CPT

## 2021-01-01 PROCEDURE — C1751 CATH, INF, PER/CENT/MIDLINE: HCPCS

## 2021-01-01 PROCEDURE — 29581 APPL MULTLAYER CMPRN SYS LEG: CPT

## 2021-01-01 PROCEDURE — 6360000002 HC RX W HCPCS: Performed by: EMERGENCY MEDICINE

## 2021-01-01 PROCEDURE — 84550 ASSAY OF BLOOD/URIC ACID: CPT

## 2021-01-01 PROCEDURE — 84145 PROCALCITONIN (PCT): CPT

## 2021-01-01 PROCEDURE — 93005 ELECTROCARDIOGRAM TRACING: CPT | Performed by: INTERNAL MEDICINE

## 2021-01-01 PROCEDURE — 3017F COLORECTAL CA SCREEN DOC REV: CPT | Performed by: NURSE PRACTITIONER

## 2021-01-01 PROCEDURE — 87040 BLOOD CULTURE FOR BACTERIA: CPT

## 2021-01-01 PROCEDURE — 96365 THER/PROPH/DIAG IV INF INIT: CPT

## 2021-01-01 PROCEDURE — 73610 X-RAY EXAM OF ANKLE: CPT

## 2021-01-01 PROCEDURE — 82728 ASSAY OF FERRITIN: CPT

## 2021-01-01 PROCEDURE — 99214 OFFICE O/P EST MOD 30 MIN: CPT | Performed by: NURSE PRACTITIONER

## 2021-01-01 PROCEDURE — 99233 SBSQ HOSP IP/OBS HIGH 50: CPT | Performed by: NURSE PRACTITIONER

## 2021-01-01 PROCEDURE — C8929 TTE W OR WO FOL WCON,DOPPLER: HCPCS

## 2021-01-01 PROCEDURE — C9113 INJ PANTOPRAZOLE SODIUM, VIA: HCPCS | Performed by: NURSE PRACTITIONER

## 2021-01-01 PROCEDURE — 97162 PT EVAL MOD COMPLEX 30 MIN: CPT

## 2021-01-01 PROCEDURE — 80076 HEPATIC FUNCTION PANEL: CPT

## 2021-01-01 PROCEDURE — 02H633Z INSERTION OF INFUSION DEVICE INTO RIGHT ATRIUM, PERCUTANEOUS APPROACH: ICD-10-PCS | Performed by: RADIOLOGY

## 2021-01-01 PROCEDURE — 99222 1ST HOSP IP/OBS MODERATE 55: CPT | Performed by: NURSE PRACTITIONER

## 2021-01-01 PROCEDURE — 96368 THER/DIAG CONCURRENT INF: CPT

## 2021-01-01 PROCEDURE — 70450 CT HEAD/BRAIN W/O DYE: CPT

## 2021-01-01 PROCEDURE — G8417 CALC BMI ABV UP PARAM F/U: HCPCS | Performed by: SURGERY

## 2021-01-01 PROCEDURE — U0002 COVID-19 LAB TEST NON-CDC: HCPCS

## 2021-01-01 PROCEDURE — G8417 CALC BMI ABV UP PARAM F/U: HCPCS | Performed by: INTERNAL MEDICINE

## 2021-01-01 PROCEDURE — 74176 CT ABD & PELVIS W/O CONTRAST: CPT

## 2021-01-01 PROCEDURE — 85014 HEMATOCRIT: CPT

## 2021-01-01 PROCEDURE — 1111F DSCHRG MED/CURRENT MED MERGE: CPT | Performed by: INTERNAL MEDICINE

## 2021-01-01 PROCEDURE — 82787 IGG 1 2 3 OR 4 EACH: CPT

## 2021-01-01 PROCEDURE — 73110 X-RAY EXAM OF WRIST: CPT

## 2021-01-01 PROCEDURE — 84134 ASSAY OF PREALBUMIN: CPT

## 2021-01-01 PROCEDURE — 2500000003 HC RX 250 WO HCPCS: Performed by: INTERNAL MEDICINE

## 2021-01-01 PROCEDURE — G8427 DOCREV CUR MEDS BY ELIG CLIN: HCPCS | Performed by: SURGERY

## 2021-01-01 PROCEDURE — 93005 ELECTROCARDIOGRAM TRACING: CPT | Performed by: EMERGENCY MEDICINE

## 2021-01-01 PROCEDURE — 87205 SMEAR GRAM STAIN: CPT

## 2021-01-01 PROCEDURE — 0JH63XZ INSERTION OF TUNNELED VASCULAR ACCESS DEVICE INTO CHEST SUBCUTANEOUS TISSUE AND FASCIA, PERCUTANEOUS APPROACH: ICD-10-PCS | Performed by: RADIOLOGY

## 2021-01-01 PROCEDURE — 99254 IP/OBS CNSLTJ NEW/EST MOD 60: CPT | Performed by: INTERNAL MEDICINE

## 2021-01-01 PROCEDURE — 87522 HEPATITIS C REVRS TRNSCRPJ: CPT

## 2021-01-01 PROCEDURE — 2580000003 HC RX 258

## 2021-01-01 PROCEDURE — 81003 URINALYSIS AUTO W/O SCOPE: CPT

## 2021-01-01 PROCEDURE — 3046F HEMOGLOBIN A1C LEVEL >9.0%: CPT | Performed by: NURSE PRACTITIONER

## 2021-01-01 PROCEDURE — 82306 VITAMIN D 25 HYDROXY: CPT

## 2021-01-01 PROCEDURE — 82140 ASSAY OF AMMONIA: CPT

## 2021-01-01 PROCEDURE — 2000000000 HC ICU R&B

## 2021-01-01 PROCEDURE — 4004F PT TOBACCO SCREEN RCVD TLK: CPT | Performed by: INTERNAL MEDICINE

## 2021-01-01 PROCEDURE — 84443 ASSAY THYROID STIM HORMONE: CPT

## 2021-01-01 PROCEDURE — 87340 HEPATITIS B SURFACE AG IA: CPT

## 2021-01-01 PROCEDURE — 86850 RBC ANTIBODY SCREEN: CPT

## 2021-01-01 PROCEDURE — 72125 CT NECK SPINE W/O DYE: CPT

## 2021-01-01 PROCEDURE — 86334 IMMUNOFIX E-PHORESIS SERUM: CPT

## 2021-01-01 PROCEDURE — 96367 TX/PROPH/DG ADDL SEQ IV INF: CPT

## 2021-01-01 PROCEDURE — 83036 HEMOGLOBIN GLYCOSYLATED A1C: CPT

## 2021-01-01 PROCEDURE — 86901 BLOOD TYPING SEROLOGIC RH(D): CPT

## 2021-01-01 PROCEDURE — 93970 EXTREMITY STUDY: CPT

## 2021-01-01 PROCEDURE — 93925 LOWER EXTREMITY STUDY: CPT

## 2021-01-01 PROCEDURE — 2022F DILAT RTA XM EVC RTNOPTHY: CPT | Performed by: NURSE PRACTITIONER

## 2021-01-01 PROCEDURE — 94660 CPAP INITIATION&MGMT: CPT

## 2021-01-01 PROCEDURE — 36569 INSJ PICC 5 YR+ W/O IMAGING: CPT

## 2021-01-01 PROCEDURE — 94729 DIFFUSING CAPACITY: CPT

## 2021-01-01 PROCEDURE — 82436 ASSAY OF URINE CHLORIDE: CPT

## 2021-01-01 PROCEDURE — 99222 1ST HOSP IP/OBS MODERATE 55: CPT | Performed by: PHYSICIAN ASSISTANT

## 2021-01-01 PROCEDURE — 84155 ASSAY OF PROTEIN SERUM: CPT

## 2021-01-01 PROCEDURE — 94760 N-INVAS EAR/PLS OXIMETRY 1: CPT

## 2021-01-01 PROCEDURE — 83550 IRON BINDING TEST: CPT

## 2021-01-01 PROCEDURE — C1894 INTRO/SHEATH, NON-LASER: HCPCS

## 2021-01-01 PROCEDURE — 5A1D70Z PERFORMANCE OF URINARY FILTRATION, INTERMITTENT, LESS THAN 6 HOURS PER DAY: ICD-10-PCS | Performed by: INTERNAL MEDICINE

## 2021-01-01 PROCEDURE — 93005 ELECTROCARDIOGRAM TRACING: CPT | Performed by: PHYSICIAN ASSISTANT

## 2021-01-01 PROCEDURE — 02HV33Z INSERTION OF INFUSION DEVICE INTO SUPERIOR VENA CAVA, PERCUTANEOUS APPROACH: ICD-10-PCS | Performed by: INTERNAL MEDICINE

## 2021-01-01 PROCEDURE — G8484 FLU IMMUNIZE NO ADMIN: HCPCS | Performed by: NURSE PRACTITIONER

## 2021-01-01 PROCEDURE — 99213 OFFICE O/P EST LOW 20 MIN: CPT | Performed by: NURSE PRACTITIONER

## 2021-01-01 PROCEDURE — 83540 ASSAY OF IRON: CPT

## 2021-01-01 PROCEDURE — 99222 1ST HOSP IP/OBS MODERATE 55: CPT | Performed by: INTERNAL MEDICINE

## 2021-01-01 PROCEDURE — 74181 MRI ABDOMEN W/O CONTRAST: CPT

## 2021-01-01 PROCEDURE — 97597 DBRDMT OPN WND 1ST 20 CM/<: CPT | Performed by: INTERNAL MEDICINE

## 2021-01-01 PROCEDURE — 82533 TOTAL CORTISOL: CPT

## 2021-01-01 PROCEDURE — 36558 INSERT TUNNELED CV CATH: CPT

## 2021-01-01 PROCEDURE — 51798 US URINE CAPACITY MEASURE: CPT

## 2021-01-01 PROCEDURE — 97165 OT EVAL LOW COMPLEX 30 MIN: CPT

## 2021-01-01 PROCEDURE — G8926 SPIRO NO PERF OR DOC: HCPCS | Performed by: INTERNAL MEDICINE

## 2021-01-01 PROCEDURE — 71271 CT THORAX LUNG CANCER SCR C-: CPT

## 2021-01-01 PROCEDURE — 80307 DRUG TEST PRSMV CHEM ANLYZR: CPT

## 2021-01-01 PROCEDURE — 96376 TX/PRO/DX INJ SAME DRUG ADON: CPT

## 2021-01-01 PROCEDURE — 92950 HEART/LUNG RESUSCITATION CPR: CPT

## 2021-01-01 PROCEDURE — 99214 OFFICE O/P EST MOD 30 MIN: CPT

## 2021-01-01 PROCEDURE — B518ZZA FLUOROSCOPY OF SUPERIOR VENA CAVA, GUIDANCE: ICD-10-PCS | Performed by: RADIOLOGY

## 2021-01-01 PROCEDURE — 83690 ASSAY OF LIPASE: CPT

## 2021-01-01 PROCEDURE — 02HV33Z INSERTION OF INFUSION DEVICE INTO SUPERIOR VENA CAVA, PERCUTANEOUS APPROACH: ICD-10-PCS | Performed by: RADIOLOGY

## 2021-01-01 PROCEDURE — 76937 US GUIDE VASCULAR ACCESS: CPT

## 2021-01-01 PROCEDURE — 73221 MRI JOINT UPR EXTREM W/O DYE: CPT

## 2021-01-01 PROCEDURE — 99283 EMERGENCY DEPT VISIT LOW MDM: CPT

## 2021-01-01 PROCEDURE — 99232 SBSQ HOSP IP/OBS MODERATE 35: CPT | Performed by: PHYSICIAN ASSISTANT

## 2021-01-01 PROCEDURE — B246ZZZ ULTRASONOGRAPHY OF RIGHT AND LEFT HEART: ICD-10-PCS | Performed by: INTERNAL MEDICINE

## 2021-01-01 PROCEDURE — 87186 SC STD MICRODIL/AGAR DIL: CPT

## 2021-01-01 PROCEDURE — 6360000002 HC RX W HCPCS: Performed by: RADIOLOGY

## 2021-01-01 PROCEDURE — 97597 DBRDMT OPN WND 1ST 20 CM/<: CPT

## 2021-01-01 PROCEDURE — 82550 ASSAY OF CK (CPK): CPT

## 2021-01-01 RX ORDER — TORSEMIDE 100 MG/1
100 TABLET ORAL DAILY
Status: DISCONTINUED | OUTPATIENT
Start: 2021-01-01 | End: 2021-01-01

## 2021-01-01 RX ORDER — HEPARIN SODIUM 5000 [USP'U]/ML
5000 INJECTION, SOLUTION INTRAVENOUS; SUBCUTANEOUS EVERY 8 HOURS SCHEDULED
Status: DISCONTINUED | OUTPATIENT
Start: 2021-01-01 | End: 2021-01-01

## 2021-01-01 RX ORDER — LAMOTRIGINE 100 MG/1
200 TABLET ORAL 2 TIMES DAILY
Status: DISCONTINUED | OUTPATIENT
Start: 2021-01-01 | End: 2021-01-01 | Stop reason: HOSPADM

## 2021-01-01 RX ORDER — ACETAMINOPHEN 650 MG/1
650 SUPPOSITORY RECTAL EVERY 6 HOURS PRN
Status: DISCONTINUED | OUTPATIENT
Start: 2021-01-01 | End: 2021-01-01 | Stop reason: HOSPADM

## 2021-01-01 RX ORDER — ATORVASTATIN CALCIUM 10 MG/1
20 TABLET, FILM COATED ORAL NIGHTLY
Status: DISCONTINUED | OUTPATIENT
Start: 2021-01-01 | End: 2021-01-01 | Stop reason: HOSPADM

## 2021-01-01 RX ORDER — SPIRONOLACTONE 25 MG/1
25 TABLET ORAL DAILY
Status: DISCONTINUED | OUTPATIENT
Start: 2021-01-01 | End: 2021-01-01

## 2021-01-01 RX ORDER — TOLVAPTAN 15 MG/1
15 TABLET ORAL
Status: DISCONTINUED | OUTPATIENT
Start: 2021-01-01 | End: 2021-01-01

## 2021-01-01 RX ORDER — POTASSIUM CHLORIDE 20 MEQ/1
40 TABLET, EXTENDED RELEASE ORAL 2 TIMES DAILY
Status: DISCONTINUED | OUTPATIENT
Start: 2021-01-01 | End: 2021-01-01 | Stop reason: HOSPADM

## 2021-01-01 RX ORDER — SODIUM CHLORIDE 0.9 % (FLUSH) 0.9 %
5-40 SYRINGE (ML) INJECTION EVERY 12 HOURS SCHEDULED
Status: DISCONTINUED | OUTPATIENT
Start: 2021-01-01 | End: 2021-01-01 | Stop reason: HOSPADM

## 2021-01-01 RX ORDER — SODIUM CHLORIDE 0.9 % (FLUSH) 0.9 %
10 SYRINGE (ML) INJECTION EVERY 12 HOURS SCHEDULED
Status: DISCONTINUED | OUTPATIENT
Start: 2021-01-01 | End: 2021-01-01 | Stop reason: HOSPADM

## 2021-01-01 RX ORDER — ISOSORBIDE DINITRATE 10 MG/1
10 TABLET ORAL 3 TIMES DAILY
Status: DISCONTINUED | OUTPATIENT
Start: 2021-01-01 | End: 2021-01-01

## 2021-01-01 RX ORDER — AMIODARONE HYDROCHLORIDE 200 MG/1
200 TABLET ORAL DAILY
Status: DISCONTINUED | OUTPATIENT
Start: 2021-01-01 | End: 2021-01-01 | Stop reason: HOSPADM

## 2021-01-01 RX ORDER — BUDESONIDE 0.5 MG/2ML
0.5 INHALANT ORAL 2 TIMES DAILY
Status: DISCONTINUED | OUTPATIENT
Start: 2021-01-01 | End: 2021-01-01 | Stop reason: HOSPADM

## 2021-01-01 RX ORDER — ALBUMIN (HUMAN) 12.5 G/50ML
12.5 SOLUTION INTRAVENOUS EVERY 8 HOURS
Status: COMPLETED | OUTPATIENT
Start: 2021-01-01 | End: 2021-01-01

## 2021-01-01 RX ORDER — MORPHINE SULFATE 4 MG/ML
4 INJECTION, SOLUTION INTRAMUSCULAR; INTRAVENOUS ONCE
Status: COMPLETED | OUTPATIENT
Start: 2021-01-01 | End: 2021-01-01

## 2021-01-01 RX ORDER — POLYETHYLENE GLYCOL 3350 17 G/17G
17 POWDER, FOR SOLUTION ORAL DAILY
Status: DISCONTINUED | OUTPATIENT
Start: 2021-01-01 | End: 2021-01-01 | Stop reason: HOSPADM

## 2021-01-01 RX ORDER — POTASSIUM CHLORIDE 7.45 MG/ML
10 INJECTION INTRAVENOUS
Status: DISCONTINUED | OUTPATIENT
Start: 2021-01-01 | End: 2021-01-01

## 2021-01-01 RX ORDER — LIDOCAINE 50 MG/G
OINTMENT TOPICAL ONCE
Status: DISCONTINUED | OUTPATIENT
Start: 2021-01-01 | End: 2021-01-01 | Stop reason: HOSPADM

## 2021-01-01 RX ORDER — ARIPIPRAZOLE 5 MG/1
15 TABLET ORAL DAILY
Status: DISCONTINUED | OUTPATIENT
Start: 2021-01-01 | End: 2021-01-01 | Stop reason: HOSPADM

## 2021-01-01 RX ORDER — TOLVAPTAN 15 MG/1
TABLET ORAL
Qty: 30 TABLET | Refills: 1 | Status: ON HOLD | OUTPATIENT
Start: 2021-01-01 | End: 2021-01-01 | Stop reason: HOSPADM

## 2021-01-01 RX ORDER — POTASSIUM CHLORIDE 20 MEQ/1
40 TABLET, EXTENDED RELEASE ORAL ONCE
Status: COMPLETED | OUTPATIENT
Start: 2021-01-01 | End: 2021-01-01

## 2021-01-01 RX ORDER — SODIUM CHLORIDE 0.9 % (FLUSH) 0.9 %
5-40 SYRINGE (ML) INJECTION PRN
Status: DISCONTINUED | OUTPATIENT
Start: 2021-01-01 | End: 2021-01-01 | Stop reason: HOSPADM

## 2021-01-01 RX ORDER — ARFORMOTEROL TARTRATE 15 UG/2ML
15 SOLUTION RESPIRATORY (INHALATION) 2 TIMES DAILY
Status: DISCONTINUED | OUTPATIENT
Start: 2021-01-01 | End: 2021-01-01 | Stop reason: HOSPADM

## 2021-01-01 RX ORDER — DEXTROSE MONOHYDRATE 25 G/50ML
12.5 INJECTION, SOLUTION INTRAVENOUS PRN
Status: DISCONTINUED | OUTPATIENT
Start: 2021-01-01 | End: 2021-01-01 | Stop reason: HOSPADM

## 2021-01-01 RX ORDER — EPINEPHRINE 0.1 MG/ML
SYRINGE (ML) INJECTION DAILY PRN
Status: COMPLETED | OUTPATIENT
Start: 2021-01-01 | End: 2021-01-01

## 2021-01-01 RX ORDER — ONDANSETRON 4 MG/1
4 TABLET, ORALLY DISINTEGRATING ORAL EVERY 8 HOURS PRN
Status: DISCONTINUED | OUTPATIENT
Start: 2021-01-01 | End: 2021-01-01 | Stop reason: HOSPADM

## 2021-01-01 RX ORDER — POTASSIUM CHLORIDE 20 MEQ/1
40 TABLET, EXTENDED RELEASE ORAL ONCE
Status: DISCONTINUED | OUTPATIENT
Start: 2021-01-01 | End: 2021-01-01

## 2021-01-01 RX ORDER — INSULIN DEGLUDEC INJECTION 100 U/ML
30 INJECTION, SOLUTION SUBCUTANEOUS NIGHTLY
Qty: 10 PEN | Refills: 5 | Status: ON HOLD | OUTPATIENT
Start: 2021-01-01 | End: 2021-01-01 | Stop reason: SDUPTHER

## 2021-01-01 RX ORDER — TOLVAPTAN 15 MG/1
15 TABLET ORAL ONCE
Status: COMPLETED | OUTPATIENT
Start: 2021-01-01 | End: 2021-01-01

## 2021-01-01 RX ORDER — BUSPIRONE HYDROCHLORIDE 5 MG/1
30 TABLET ORAL 2 TIMES DAILY
Status: DISCONTINUED | OUTPATIENT
Start: 2021-01-01 | End: 2021-01-01 | Stop reason: HOSPADM

## 2021-01-01 RX ORDER — POTASSIUM CHLORIDE 20 MEQ/1
40 TABLET, EXTENDED RELEASE ORAL 2 TIMES DAILY
Status: COMPLETED | OUTPATIENT
Start: 2021-01-01 | End: 2021-01-01

## 2021-01-01 RX ORDER — DOCUSATE SODIUM 100 MG/1
100 CAPSULE, LIQUID FILLED ORAL 2 TIMES DAILY
Status: DISCONTINUED | OUTPATIENT
Start: 2021-01-01 | End: 2021-01-01 | Stop reason: HOSPADM

## 2021-01-01 RX ORDER — CLOPIDOGREL BISULFATE 75 MG/1
75 TABLET ORAL DAILY
Status: DISCONTINUED | OUTPATIENT
Start: 2021-01-01 | End: 2021-01-01 | Stop reason: HOSPADM

## 2021-01-01 RX ORDER — INSULIN DEGLUDEC INJECTION 100 U/ML
5 INJECTION, SOLUTION SUBCUTANEOUS NIGHTLY
Qty: 10 PEN | Refills: 5
Start: 2021-01-01

## 2021-01-01 RX ORDER — SPIRONOLACTONE 50 MG/1
50 TABLET, FILM COATED ORAL DAILY
Status: DISCONTINUED | OUTPATIENT
Start: 2021-01-01 | End: 2021-01-01

## 2021-01-01 RX ORDER — SODIUM CHLORIDE 9 MG/ML
INJECTION, SOLUTION INTRAVENOUS CONTINUOUS
Status: DISCONTINUED | OUTPATIENT
Start: 2021-01-01 | End: 2021-01-01

## 2021-01-01 RX ORDER — ATORVASTATIN CALCIUM 40 MG/1
80 TABLET, FILM COATED ORAL NIGHTLY
Status: DISCONTINUED | OUTPATIENT
Start: 2021-01-01 | End: 2021-01-01 | Stop reason: HOSPADM

## 2021-01-01 RX ORDER — PREDNISONE 20 MG/1
60 TABLET ORAL ONCE
Status: COMPLETED | OUTPATIENT
Start: 2021-01-01 | End: 2021-01-01

## 2021-01-01 RX ORDER — PROCHLORPERAZINE EDISYLATE 5 MG/ML
10 INJECTION INTRAMUSCULAR; INTRAVENOUS EVERY 6 HOURS PRN
Status: DISCONTINUED | OUTPATIENT
Start: 2021-01-01 | End: 2021-01-01 | Stop reason: HOSPADM

## 2021-01-01 RX ORDER — PANTOPRAZOLE SODIUM 40 MG/1
40 TABLET, DELAYED RELEASE ORAL DAILY
DISCHARGE
Start: 2021-01-01

## 2021-01-01 RX ORDER — SODIUM CHLORIDE 9 MG/ML
25 INJECTION, SOLUTION INTRAVENOUS PRN
Status: DISCONTINUED | OUTPATIENT
Start: 2021-01-01 | End: 2021-01-01 | Stop reason: HOSPADM

## 2021-01-01 RX ORDER — POTASSIUM CHLORIDE 29.8 MG/ML
20 INJECTION INTRAVENOUS PRN
Status: DISCONTINUED | OUTPATIENT
Start: 2021-01-01 | End: 2021-01-01 | Stop reason: HOSPADM

## 2021-01-01 RX ORDER — CARVEDILOL 3.12 MG/1
3.12 TABLET ORAL 2 TIMES DAILY WITH MEALS
Status: DISCONTINUED | OUTPATIENT
Start: 2021-01-01 | End: 2021-01-01 | Stop reason: HOSPADM

## 2021-01-01 RX ORDER — POTASSIUM CHLORIDE 7.45 MG/ML
10 INJECTION INTRAVENOUS
Status: COMPLETED | OUTPATIENT
Start: 2021-01-01 | End: 2021-01-01

## 2021-01-01 RX ORDER — POLYETHYLENE GLYCOL 3350 17 G/17G
17 POWDER, FOR SOLUTION ORAL DAILY PRN
Status: DISCONTINUED | OUTPATIENT
Start: 2021-01-01 | End: 2021-01-01 | Stop reason: SDUPTHER

## 2021-01-01 RX ORDER — HEPARIN SODIUM 1000 [USP'U]/ML
INJECTION, SOLUTION INTRAVENOUS; SUBCUTANEOUS
Status: DISPENSED
Start: 2021-01-01 | End: 2021-01-01

## 2021-01-01 RX ORDER — METOPROLOL TARTRATE 50 MG/1
50 TABLET, FILM COATED ORAL 2 TIMES DAILY
Status: DISCONTINUED | OUTPATIENT
Start: 2021-01-01 | End: 2021-01-01

## 2021-01-01 RX ORDER — POTASSIUM CHLORIDE 7.45 MG/ML
10 INJECTION INTRAVENOUS ONCE
Status: COMPLETED | OUTPATIENT
Start: 2021-01-01 | End: 2021-01-01

## 2021-01-01 RX ORDER — BUMETANIDE 1 MG/1
2 TABLET ORAL 2 TIMES DAILY
Status: DISCONTINUED | OUTPATIENT
Start: 2021-01-01 | End: 2021-01-01

## 2021-01-01 RX ORDER — BUSPIRONE HYDROCHLORIDE 30 MG/1
30 TABLET ORAL 2 TIMES DAILY
Status: ON HOLD | COMMUNITY
End: 2021-01-01 | Stop reason: HOSPADM

## 2021-01-01 RX ORDER — METOLAZONE 5 MG/1
5 TABLET ORAL
Qty: 1 TABLET | Refills: 0 | Status: ON HOLD
Start: 2021-01-01 | End: 2021-01-01 | Stop reason: HOSPADM

## 2021-01-01 RX ORDER — HYDRALAZINE HYDROCHLORIDE 10 MG/1
10 TABLET, FILM COATED ORAL EVERY 8 HOURS SCHEDULED
Qty: 90 TABLET | Refills: 3 | Status: ON HOLD | OUTPATIENT
Start: 2021-01-01 | End: 2021-01-01 | Stop reason: HOSPADM

## 2021-01-01 RX ORDER — TORSEMIDE 100 MG/1
100 TABLET ORAL DAILY
Qty: 90 TABLET | Refills: 1 | Status: ON HOLD | OUTPATIENT
Start: 2021-01-01 | End: 2021-01-01 | Stop reason: HOSPADM

## 2021-01-01 RX ORDER — VANCOMYCIN HYDROCHLORIDE 1 G/20ML
INJECTION, POWDER, LYOPHILIZED, FOR SOLUTION INTRAVENOUS
Status: DISCONTINUED
Start: 2021-01-01 | End: 2021-01-01

## 2021-01-01 RX ORDER — LIDOCAINE HYDROCHLORIDE 40 MG/ML
SOLUTION TOPICAL ONCE
Status: DISCONTINUED | OUTPATIENT
Start: 2021-01-01 | End: 2021-01-01 | Stop reason: HOSPADM

## 2021-01-01 RX ORDER — 0.9 % SODIUM CHLORIDE 0.9 %
1000 INTRAVENOUS SOLUTION INTRAVENOUS ONCE
Status: COMPLETED | OUTPATIENT
Start: 2021-01-01 | End: 2021-01-01

## 2021-01-01 RX ORDER — PANTOPRAZOLE SODIUM 40 MG/1
40 TABLET, DELAYED RELEASE ORAL 2 TIMES DAILY
Status: DISCONTINUED | OUTPATIENT
Start: 2021-01-01 | End: 2021-01-01 | Stop reason: HOSPADM

## 2021-01-01 RX ORDER — INSULIN ASPART 100 [IU]/ML
10 INJECTION, SOLUTION INTRAVENOUS; SUBCUTANEOUS
Qty: 5 PEN | Refills: 3 | Status: ON HOLD | OUTPATIENT
Start: 2021-01-01 | End: 2021-01-01 | Stop reason: HOSPADM

## 2021-01-01 RX ORDER — METOLAZONE 2.5 MG/1
2.5 TABLET ORAL
Status: DISCONTINUED | OUTPATIENT
Start: 2021-01-01 | End: 2021-01-01

## 2021-01-01 RX ORDER — HYDROXYZINE PAMOATE 25 MG/1
10 CAPSULE ORAL 4 TIMES DAILY
COMMUNITY

## 2021-01-01 RX ORDER — HEPARIN SODIUM 1000 [USP'U]/ML
40 INJECTION, SOLUTION INTRAVENOUS; SUBCUTANEOUS PRN
Status: DISCONTINUED | OUTPATIENT
Start: 2021-01-01 | End: 2021-01-01

## 2021-01-01 RX ORDER — SODIUM BICARBONATE 650 MG/1
650 TABLET ORAL 3 TIMES DAILY
Status: DISCONTINUED | OUTPATIENT
Start: 2021-01-01 | End: 2021-01-01 | Stop reason: HOSPADM

## 2021-01-01 RX ORDER — ONDANSETRON 2 MG/ML
4 INJECTION INTRAMUSCULAR; INTRAVENOUS EVERY 6 HOURS PRN
Status: DISCONTINUED | OUTPATIENT
Start: 2021-01-01 | End: 2021-01-01 | Stop reason: HOSPADM

## 2021-01-01 RX ORDER — ARIPIPRAZOLE 15 MG/1
15 TABLET ORAL DAILY
COMMUNITY

## 2021-01-01 RX ORDER — OXYCODONE HYDROCHLORIDE 5 MG/1
5 TABLET ORAL EVERY 8 HOURS PRN
Status: DISCONTINUED | OUTPATIENT
Start: 2021-01-01 | End: 2021-01-01 | Stop reason: HOSPADM

## 2021-01-01 RX ORDER — NITROGLYCERIN 0.4 MG/1
0.4 TABLET SUBLINGUAL EVERY 5 MIN PRN
Status: DISCONTINUED | OUTPATIENT
Start: 2021-01-01 | End: 2021-01-01 | Stop reason: HOSPADM

## 2021-01-01 RX ORDER — BENZTROPINE MESYLATE 1 MG/1
1 TABLET ORAL NIGHTLY
Status: DISCONTINUED | OUTPATIENT
Start: 2021-01-01 | End: 2021-01-01 | Stop reason: HOSPADM

## 2021-01-01 RX ORDER — HYDRALAZINE HYDROCHLORIDE 10 MG/1
10 TABLET, FILM COATED ORAL EVERY 8 HOURS SCHEDULED
Status: DISCONTINUED | OUTPATIENT
Start: 2021-01-01 | End: 2021-01-01

## 2021-01-01 RX ORDER — MIDODRINE HYDROCHLORIDE 10 MG/1
10 TABLET ORAL
DISCHARGE
Start: 2021-01-01

## 2021-01-01 RX ORDER — POTASSIUM CHLORIDE 20 MEQ/1
20 TABLET, EXTENDED RELEASE ORAL DAILY
Qty: 30 TABLET | Refills: 0 | Status: ON HOLD | OUTPATIENT
Start: 2021-01-01 | End: 2021-01-01

## 2021-01-01 RX ORDER — OXYCODONE HYDROCHLORIDE AND ACETAMINOPHEN 5; 325 MG/1; MG/1
2 TABLET ORAL ONCE
Status: COMPLETED | OUTPATIENT
Start: 2021-01-01 | End: 2021-01-01

## 2021-01-01 RX ORDER — OXYCODONE HYDROCHLORIDE AND ACETAMINOPHEN 5; 325 MG/1; MG/1
1 TABLET ORAL ONCE
Status: COMPLETED | OUTPATIENT
Start: 2021-01-01 | End: 2021-01-01

## 2021-01-01 RX ORDER — POLYETHYLENE GLYCOL 3350 17 G/17G
17 POWDER, FOR SOLUTION ORAL DAILY PRN
Status: DISCONTINUED | OUTPATIENT
Start: 2021-01-01 | End: 2021-01-01 | Stop reason: HOSPADM

## 2021-01-01 RX ORDER — INSULIN LISPRO 100 [IU]/ML
0-9 INJECTION, SOLUTION INTRAVENOUS; SUBCUTANEOUS NIGHTLY
Status: DISCONTINUED | OUTPATIENT
Start: 2021-01-01 | End: 2021-01-01

## 2021-01-01 RX ORDER — DOXEPIN HYDROCHLORIDE 25 MG/1
25 CAPSULE ORAL NIGHTLY
COMMUNITY

## 2021-01-01 RX ORDER — ACETAMINOPHEN 325 MG/1
650 TABLET ORAL EVERY 6 HOURS PRN
Status: DISCONTINUED | OUTPATIENT
Start: 2021-01-01 | End: 2021-01-01 | Stop reason: HOSPADM

## 2021-01-01 RX ORDER — BUMETANIDE 2 MG/1
2 TABLET ORAL 2 TIMES DAILY
Qty: 30 TABLET | Refills: 3 | Status: ON HOLD | OUTPATIENT
Start: 2021-01-01 | End: 2021-01-01 | Stop reason: HOSPADM

## 2021-01-01 RX ORDER — HYDROCODONE BITARTRATE AND ACETAMINOPHEN 5; 325 MG/1; MG/1
TABLET ORAL EVERY 8 HOURS PRN
Status: ON HOLD | COMMUNITY
Start: 2021-01-01 | End: 2021-01-01 | Stop reason: SDUPTHER

## 2021-01-01 RX ORDER — POTASSIUM CHLORIDE 20 MEQ/1
60 TABLET, EXTENDED RELEASE ORAL ONCE
Status: COMPLETED | OUTPATIENT
Start: 2021-01-01 | End: 2021-01-01

## 2021-01-01 RX ORDER — DEXTROSE MONOHYDRATE 25 G/50ML
25 INJECTION, SOLUTION INTRAVENOUS ONCE
Status: COMPLETED | OUTPATIENT
Start: 2021-01-01 | End: 2021-01-01

## 2021-01-01 RX ORDER — BENZTROPINE MESYLATE 1 MG/1
0.5 TABLET ORAL NIGHTLY
Status: DISCONTINUED | OUTPATIENT
Start: 2021-01-01 | End: 2021-01-01 | Stop reason: HOSPADM

## 2021-01-01 RX ORDER — OXYCODONE HYDROCHLORIDE AND ACETAMINOPHEN 5; 325 MG/1; MG/1
1 TABLET ORAL EVERY 4 HOURS PRN
Status: COMPLETED | OUTPATIENT
Start: 2021-01-01 | End: 2021-01-01

## 2021-01-01 RX ORDER — 0.9 % SODIUM CHLORIDE 0.9 %
250 INTRAVENOUS SOLUTION INTRAVENOUS ONCE
Status: COMPLETED | OUTPATIENT
Start: 2021-01-01 | End: 2021-01-01

## 2021-01-01 RX ORDER — SPIRONOLACTONE 25 MG/1
25 TABLET ORAL 2 TIMES DAILY
Qty: 30 TABLET | Refills: 3 | Status: CANCELLED | OUTPATIENT
Start: 2021-01-01

## 2021-01-01 RX ORDER — BUDESONIDE AND FORMOTEROL FUMARATE DIHYDRATE 160; 4.5 UG/1; UG/1
2 AEROSOL RESPIRATORY (INHALATION) 2 TIMES DAILY
Qty: 1 INHALER | Refills: 5 | Status: SHIPPED | OUTPATIENT
Start: 2021-01-01 | End: 2021-01-01 | Stop reason: ALTCHOICE

## 2021-01-01 RX ORDER — FUROSEMIDE 10 MG/ML
60 INJECTION INTRAMUSCULAR; INTRAVENOUS 2 TIMES DAILY
Status: DISCONTINUED | OUTPATIENT
Start: 2021-01-01 | End: 2021-01-01

## 2021-01-01 RX ORDER — TOLVAPTAN 15 MG/1
7.5 TABLET ORAL ONCE
Status: COMPLETED | OUTPATIENT
Start: 2021-01-01 | End: 2021-01-01

## 2021-01-01 RX ORDER — SODIUM CHLORIDE 0.9 % (FLUSH) 0.9 %
10 SYRINGE (ML) INJECTION PRN
Status: DISCONTINUED | OUTPATIENT
Start: 2021-01-01 | End: 2021-01-01 | Stop reason: HOSPADM

## 2021-01-01 RX ORDER — LANOLIN ALCOHOL/MO/W.PET/CERES
3 CREAM (GRAM) TOPICAL NIGHTLY PRN
Status: DISCONTINUED | OUTPATIENT
Start: 2021-01-01 | End: 2021-01-01 | Stop reason: HOSPADM

## 2021-01-01 RX ORDER — INSULIN LISPRO 100 [IU]/ML
0-12 INJECTION, SOLUTION INTRAVENOUS; SUBCUTANEOUS
Status: DISCONTINUED | OUTPATIENT
Start: 2021-01-01 | End: 2021-01-01 | Stop reason: HOSPADM

## 2021-01-01 RX ORDER — INSULIN GLARGINE 100 [IU]/ML
5 INJECTION, SOLUTION SUBCUTANEOUS NIGHTLY
Status: DISCONTINUED | OUTPATIENT
Start: 2021-01-01 | End: 2021-01-01 | Stop reason: HOSPADM

## 2021-01-01 RX ORDER — HYDROCODONE BITARTRATE AND ACETAMINOPHEN 5; 325 MG/1; MG/1
1 TABLET ORAL EVERY 6 HOURS PRN
Status: DISCONTINUED | OUTPATIENT
Start: 2021-01-01 | End: 2021-01-01 | Stop reason: HOSPADM

## 2021-01-01 RX ORDER — NICOTINE POLACRILEX 4 MG
15 LOZENGE BUCCAL PRN
Status: DISCONTINUED | OUTPATIENT
Start: 2021-01-01 | End: 2021-01-01 | Stop reason: HOSPADM

## 2021-01-01 RX ORDER — FUROSEMIDE 10 MG/ML
40 INJECTION INTRAMUSCULAR; INTRAVENOUS 2 TIMES DAILY
Status: DISCONTINUED | OUTPATIENT
Start: 2021-01-01 | End: 2021-01-01

## 2021-01-01 RX ORDER — DEXTROSE MONOHYDRATE 50 MG/ML
INJECTION, SOLUTION INTRAVENOUS PRN
Status: DISCONTINUED | OUTPATIENT
Start: 2021-01-01 | End: 2021-01-01 | Stop reason: HOSPADM

## 2021-01-01 RX ORDER — HEPARIN SODIUM 1000 [USP'U]/ML
3800 INJECTION, SOLUTION INTRAVENOUS; SUBCUTANEOUS PRN
Status: DISCONTINUED | OUTPATIENT
Start: 2021-01-01 | End: 2021-01-01 | Stop reason: HOSPADM

## 2021-01-01 RX ORDER — CLOPIDOGREL BISULFATE 75 MG/1
75 TABLET ORAL DAILY
Status: DISCONTINUED | OUTPATIENT
Start: 2021-01-01 | End: 2021-01-01

## 2021-01-01 RX ORDER — POTASSIUM CHLORIDE 20 MEQ/1
40 TABLET, EXTENDED RELEASE ORAL 2 TIMES DAILY WITH MEALS
Status: DISCONTINUED | OUTPATIENT
Start: 2021-01-01 | End: 2021-01-01

## 2021-01-01 RX ORDER — METOPROLOL TARTRATE 50 MG/1
50 TABLET, FILM COATED ORAL 2 TIMES DAILY
Qty: 60 TABLET | Refills: 3 | Status: ON HOLD | OUTPATIENT
Start: 2021-01-01 | End: 2021-01-01 | Stop reason: HOSPADM

## 2021-01-01 RX ORDER — IPRATROPIUM BROMIDE AND ALBUTEROL SULFATE 2.5; .5 MG/3ML; MG/3ML
1 SOLUTION RESPIRATORY (INHALATION) EVERY 4 HOURS PRN
Status: DISCONTINUED | OUTPATIENT
Start: 2021-01-01 | End: 2021-01-01 | Stop reason: HOSPADM

## 2021-01-01 RX ORDER — METOLAZONE 2.5 MG/1
5 TABLET ORAL
Status: DISCONTINUED | OUTPATIENT
Start: 2021-01-01 | End: 2021-01-01

## 2021-01-01 RX ORDER — BUSPIRONE HYDROCHLORIDE 10 MG/1
30 TABLET ORAL 2 TIMES DAILY
Status: DISCONTINUED | OUTPATIENT
Start: 2021-01-01 | End: 2021-01-01 | Stop reason: HOSPADM

## 2021-01-01 RX ORDER — POTASSIUM CHLORIDE 7.45 MG/ML
40 INJECTION INTRAVENOUS ONCE
Status: DISCONTINUED | OUTPATIENT
Start: 2021-01-01 | End: 2021-01-01

## 2021-01-01 RX ORDER — BUPRENORPHINE AND NALOXONE 8; 2 MG/1; MG/1
1 FILM, SOLUBLE BUCCAL; SUBLINGUAL 2 TIMES DAILY
Status: DISCONTINUED | OUTPATIENT
Start: 2021-01-01 | End: 2021-01-01 | Stop reason: HOSPADM

## 2021-01-01 RX ORDER — MIDODRINE HYDROCHLORIDE 10 MG/1
10 TABLET ORAL
Status: DISCONTINUED | OUTPATIENT
Start: 2021-01-01 | End: 2021-01-01 | Stop reason: HOSPADM

## 2021-01-01 RX ORDER — SODIUM CHLORIDE 9 MG/ML
INJECTION, SOLUTION INTRAVENOUS
Status: COMPLETED
Start: 2021-01-01 | End: 2021-01-01

## 2021-01-01 RX ORDER — BENZTROPINE MESYLATE 1 MG/1
1 TABLET ORAL NIGHTLY
Status: DISCONTINUED | OUTPATIENT
Start: 2021-01-01 | End: 2021-01-01

## 2021-01-01 RX ORDER — HEPARIN SODIUM 5000 [USP'U]/ML
5000 INJECTION, SOLUTION INTRAVENOUS; SUBCUTANEOUS EVERY 8 HOURS SCHEDULED
Status: DISCONTINUED | OUTPATIENT
Start: 2021-01-01 | End: 2021-01-01 | Stop reason: HOSPADM

## 2021-01-01 RX ORDER — HYDROCODONE BITARTRATE AND ACETAMINOPHEN 5; 325 MG/1; MG/1
1 TABLET ORAL EVERY 8 HOURS PRN
COMMUNITY

## 2021-01-01 RX ORDER — TRAZODONE HYDROCHLORIDE 100 MG/1
100 TABLET ORAL NIGHTLY PRN
Status: DISCONTINUED | OUTPATIENT
Start: 2021-01-01 | End: 2021-01-01 | Stop reason: HOSPADM

## 2021-01-01 RX ORDER — ONDANSETRON 2 MG/ML
4 INJECTION INTRAMUSCULAR; INTRAVENOUS EVERY 6 HOURS PRN
Status: DISCONTINUED | OUTPATIENT
Start: 2021-01-01 | End: 2021-01-01

## 2021-01-01 RX ORDER — HYDRALAZINE HYDROCHLORIDE 10 MG/1
10 TABLET, FILM COATED ORAL EVERY 8 HOURS SCHEDULED
Status: DISCONTINUED | OUTPATIENT
Start: 2021-01-01 | End: 2021-01-01 | Stop reason: HOSPADM

## 2021-01-01 RX ORDER — LIDOCAINE 40 MG/G
CREAM TOPICAL ONCE
Status: CANCELLED | OUTPATIENT
Start: 2021-01-01 | End: 2021-01-01

## 2021-01-01 RX ORDER — DEXTROSE MONOHYDRATE 50 MG/ML
100 INJECTION, SOLUTION INTRAVENOUS PRN
Status: DISCONTINUED | OUTPATIENT
Start: 2021-01-01 | End: 2021-01-01

## 2021-01-01 RX ORDER — SODIUM CHLORIDE AND POTASSIUM CHLORIDE .9; .15 G/100ML; G/100ML
SOLUTION INTRAVENOUS CONTINUOUS
Status: DISCONTINUED | OUTPATIENT
Start: 2021-01-01 | End: 2021-01-01

## 2021-01-01 RX ORDER — LIDOCAINE HYDROCHLORIDE 40 MG/ML
SOLUTION TOPICAL ONCE
Status: CANCELLED | OUTPATIENT
Start: 2021-01-01 | End: 2021-01-01

## 2021-01-01 RX ORDER — POTASSIUM CHLORIDE 20 MEQ/1
40 TABLET, EXTENDED RELEASE ORAL
Status: COMPLETED | OUTPATIENT
Start: 2021-01-01 | End: 2021-01-01

## 2021-01-01 RX ORDER — POLYETHYLENE GLYCOL 3350 17 G/17G
17 POWDER, FOR SOLUTION ORAL DAILY PRN
Status: DISCONTINUED | OUTPATIENT
Start: 2021-01-01 | End: 2021-01-01

## 2021-01-01 RX ORDER — FUROSEMIDE 10 MG/ML
80 INJECTION INTRAMUSCULAR; INTRAVENOUS ONCE
Status: COMPLETED | OUTPATIENT
Start: 2021-01-01 | End: 2021-01-01

## 2021-01-01 RX ORDER — ASPIRIN 81 MG/1
81 TABLET ORAL DAILY
Status: DISCONTINUED | OUTPATIENT
Start: 2021-01-01 | End: 2021-01-01

## 2021-01-01 RX ORDER — TORSEMIDE 100 MG/1
50 TABLET ORAL DAILY
Status: DISCONTINUED | OUTPATIENT
Start: 2021-01-01 | End: 2021-01-01 | Stop reason: HOSPADM

## 2021-01-01 RX ORDER — MIDODRINE HYDROCHLORIDE 10 MG/1
TABLET ORAL
Status: DISPENSED
Start: 2021-01-01 | End: 2021-01-01

## 2021-01-01 RX ORDER — DEXTROSE MONOHYDRATE 50 MG/ML
100 INJECTION, SOLUTION INTRAVENOUS PRN
Status: DISCONTINUED | OUTPATIENT
Start: 2021-01-01 | End: 2021-01-01 | Stop reason: HOSPADM

## 2021-01-01 RX ORDER — DOXYCYCLINE HYCLATE 100 MG
100 TABLET ORAL EVERY 12 HOURS SCHEDULED
Status: DISCONTINUED | OUTPATIENT
Start: 2021-01-01 | End: 2021-01-01

## 2021-01-01 RX ORDER — FUROSEMIDE 10 MG/ML
80 INJECTION INTRAMUSCULAR; INTRAVENOUS ONCE
Status: DISCONTINUED | OUTPATIENT
Start: 2021-01-01 | End: 2021-01-01 | Stop reason: SDUPTHER

## 2021-01-01 RX ORDER — CLOPIDOGREL BISULFATE 75 MG/1
1 TABLET ORAL DAILY
Status: DISCONTINUED | OUTPATIENT
Start: 2021-01-01 | End: 2021-01-01

## 2021-01-01 RX ORDER — HEPARIN SODIUM 1000 [USP'U]/ML
80 INJECTION, SOLUTION INTRAVENOUS; SUBCUTANEOUS PRN
Status: DISCONTINUED | OUTPATIENT
Start: 2021-01-01 | End: 2021-01-01

## 2021-01-01 RX ORDER — SENNOSIDES 8.6 MG
650 CAPSULE ORAL EVERY 6 HOURS PRN
COMMUNITY

## 2021-01-01 RX ORDER — ALBUTEROL SULFATE 90 UG/1
2 AEROSOL, METERED RESPIRATORY (INHALATION) ONCE
Status: COMPLETED | OUTPATIENT
Start: 2021-01-01 | End: 2021-01-01

## 2021-01-01 RX ORDER — MIDODRINE HYDROCHLORIDE 5 MG/1
5 TABLET ORAL 2 TIMES DAILY
Status: DISCONTINUED | OUTPATIENT
Start: 2021-01-01 | End: 2021-01-01 | Stop reason: HOSPADM

## 2021-01-01 RX ORDER — INSULIN GLARGINE 100 [IU]/ML
30 INJECTION, SOLUTION SUBCUTANEOUS NIGHTLY
Status: DISCONTINUED | OUTPATIENT
Start: 2021-01-01 | End: 2021-01-01 | Stop reason: HOSPADM

## 2021-01-01 RX ORDER — DOXEPIN HYDROCHLORIDE 25 MG/1
25 CAPSULE ORAL NIGHTLY
Status: DISCONTINUED | OUTPATIENT
Start: 2021-01-01 | End: 2021-01-01 | Stop reason: HOSPADM

## 2021-01-01 RX ORDER — METOPROLOL TARTRATE 50 MG/1
50 TABLET, FILM COATED ORAL 2 TIMES DAILY
Status: DISCONTINUED | OUTPATIENT
Start: 2021-01-01 | End: 2021-01-01 | Stop reason: HOSPADM

## 2021-01-01 RX ORDER — BUDESONIDE AND FORMOTEROL FUMARATE DIHYDRATE 160; 4.5 UG/1; UG/1
2 AEROSOL RESPIRATORY (INHALATION) 2 TIMES DAILY
Status: DISCONTINUED | OUTPATIENT
Start: 2021-01-01 | End: 2021-01-01 | Stop reason: HOSPADM

## 2021-01-01 RX ORDER — ACETAMINOPHEN 650 MG
TABLET, EXTENDED RELEASE ORAL DAILY
Status: DISCONTINUED | OUTPATIENT
Start: 2021-01-01 | End: 2021-01-01 | Stop reason: HOSPADM

## 2021-01-01 RX ORDER — POTASSIUM CHLORIDE 20 MEQ/1
40 TABLET, EXTENDED RELEASE ORAL DAILY
Qty: 60 TABLET | Refills: 3 | Status: ON HOLD | DISCHARGE
Start: 2021-01-01 | End: 2021-01-01 | Stop reason: HOSPADM

## 2021-01-01 RX ORDER — POTASSIUM CHLORIDE 7.45 MG/ML
10 INJECTION INTRAVENOUS
Status: DISCONTINUED | OUTPATIENT
Start: 2021-01-01 | End: 2021-01-01 | Stop reason: DRUGHIGH

## 2021-01-01 RX ORDER — AMIODARONE HYDROCHLORIDE 200 MG/1
200 TABLET ORAL DAILY
COMMUNITY

## 2021-01-01 RX ORDER — QUETIAPINE FUMARATE 50 MG/1
1 TABLET, EXTENDED RELEASE ORAL NIGHTLY
Status: ON HOLD | COMMUNITY
End: 2021-01-01

## 2021-01-01 RX ORDER — SPIRONOLACTONE 25 MG/1
25 TABLET ORAL 2 TIMES DAILY
Status: DISCONTINUED | OUTPATIENT
Start: 2021-01-01 | End: 2021-01-01 | Stop reason: HOSPADM

## 2021-01-01 RX ORDER — PREDNISONE 10 MG/1
TABLET ORAL
Qty: 44 TABLET | Refills: 0 | Status: SHIPPED | OUTPATIENT
Start: 2021-01-01 | End: 2021-01-01

## 2021-01-01 RX ORDER — CARVEDILOL 3.12 MG/1
3.12 TABLET ORAL 2 TIMES DAILY WITH MEALS
COMMUNITY

## 2021-01-01 RX ORDER — HYDROCODONE BITARTRATE AND ACETAMINOPHEN 5; 325 MG/1; MG/1
1 TABLET ORAL EVERY 6 HOURS PRN
Qty: 10 TABLET | Refills: 0 | Status: SHIPPED | OUTPATIENT
Start: 2021-01-01 | End: 2021-01-01

## 2021-01-01 RX ORDER — BACITRACIN ZINC AND POLYMYXIN B SULFATE 500; 1000 [USP'U]/G; [USP'U]/G
OINTMENT TOPICAL ONCE
Status: DISCONTINUED | OUTPATIENT
Start: 2021-01-01 | End: 2021-01-01 | Stop reason: HOSPADM

## 2021-01-01 RX ORDER — DEXTROSE MONOHYDRATE 100 MG/ML
INJECTION, SOLUTION INTRAVENOUS CONTINUOUS
Status: DISCONTINUED | OUTPATIENT
Start: 2021-01-01 | End: 2021-01-01

## 2021-01-01 RX ORDER — FUROSEMIDE 10 MG/ML
60 INJECTION INTRAMUSCULAR; INTRAVENOUS ONCE
Status: COMPLETED | OUTPATIENT
Start: 2021-01-01 | End: 2021-01-01

## 2021-01-01 RX ORDER — ONDANSETRON 2 MG/ML
4 INJECTION INTRAMUSCULAR; INTRAVENOUS ONCE
Status: COMPLETED | OUTPATIENT
Start: 2021-01-01 | End: 2021-01-01

## 2021-01-01 RX ORDER — ARIPIPRAZOLE 10 MG/1
15 TABLET ORAL DAILY
Status: DISCONTINUED | OUTPATIENT
Start: 2021-01-01 | End: 2021-01-01 | Stop reason: HOSPADM

## 2021-01-01 RX ORDER — FENTANYL CITRATE 50 UG/ML
INJECTION, SOLUTION INTRAMUSCULAR; INTRAVENOUS
Status: COMPLETED | OUTPATIENT
Start: 2021-01-01 | End: 2021-01-01

## 2021-01-01 RX ORDER — CEPHALEXIN 500 MG/1
1 CAPSULE ORAL 3 TIMES DAILY
Status: ON HOLD | COMMUNITY
Start: 2021-01-01 | End: 2021-01-01 | Stop reason: HOSPADM

## 2021-01-01 RX ORDER — BUMETANIDE 2 MG/1
2 TABLET ORAL 2 TIMES DAILY
Status: DISCONTINUED | OUTPATIENT
Start: 2021-01-01 | End: 2021-01-01 | Stop reason: HOSPADM

## 2021-01-01 RX ORDER — CEPHALEXIN 500 MG/1
500 CAPSULE ORAL 3 TIMES DAILY
Qty: 15 CAPSULE | Refills: 0 | Status: SHIPPED | OUTPATIENT
Start: 2021-01-01 | End: 2021-01-01

## 2021-01-01 RX ORDER — ALBUMIN (HUMAN) 12.5 G/50ML
25 SOLUTION INTRAVENOUS EVERY 6 HOURS
Status: COMPLETED | OUTPATIENT
Start: 2021-01-01 | End: 2021-01-01

## 2021-01-01 RX ORDER — PANTOPRAZOLE SODIUM 40 MG/10ML
40 INJECTION, POWDER, LYOPHILIZED, FOR SOLUTION INTRAVENOUS DAILY
Status: DISCONTINUED | OUTPATIENT
Start: 2021-01-01 | End: 2021-01-01 | Stop reason: HOSPADM

## 2021-01-01 RX ORDER — LIDOCAINE HYDROCHLORIDE 10 MG/ML
5 INJECTION, SOLUTION EPIDURAL; INFILTRATION; INTRACAUDAL; PERINEURAL ONCE
Status: DISCONTINUED | OUTPATIENT
Start: 2021-01-01 | End: 2021-01-01 | Stop reason: HOSPADM

## 2021-01-01 RX ORDER — POTASSIUM CHLORIDE 20 MEQ/1
40 TABLET, EXTENDED RELEASE ORAL ONCE
Status: DISCONTINUED | OUTPATIENT
Start: 2021-01-01 | End: 2021-01-01 | Stop reason: HOSPADM

## 2021-01-01 RX ORDER — ATORVASTATIN CALCIUM 80 MG/1
80 TABLET, FILM COATED ORAL NIGHTLY
Status: DISCONTINUED | OUTPATIENT
Start: 2021-01-01 | End: 2021-01-01 | Stop reason: HOSPADM

## 2021-01-01 RX ORDER — ZOLPIDEM TARTRATE 5 MG/1
5 TABLET ORAL NIGHTLY
Status: DISCONTINUED | OUTPATIENT
Start: 2021-01-01 | End: 2021-01-01 | Stop reason: HOSPADM

## 2021-01-01 RX ORDER — LIDOCAINE 40 MG/G
CREAM TOPICAL ONCE
Status: DISCONTINUED | OUTPATIENT
Start: 2021-01-01 | End: 2021-01-01 | Stop reason: HOSPADM

## 2021-01-01 RX ORDER — 3% SODIUM CHLORIDE 3 G/100ML
40 INJECTION, SOLUTION INTRAVENOUS CONTINUOUS
Status: DISCONTINUED | OUTPATIENT
Start: 2021-01-01 | End: 2021-01-01

## 2021-01-01 RX ORDER — DOXEPIN HYDROCHLORIDE 25 MG/1
25 CAPSULE ORAL NIGHTLY
Status: DISCONTINUED | OUTPATIENT
Start: 2021-01-01 | End: 2021-01-01

## 2021-01-01 RX ORDER — SPIRONOLACTONE 25 MG/1
25 TABLET ORAL DAILY
Status: DISCONTINUED | OUTPATIENT
Start: 2021-01-01 | End: 2021-01-01 | Stop reason: HOSPADM

## 2021-01-01 RX ORDER — HEPARIN SODIUM 10000 [USP'U]/100ML
18 INJECTION, SOLUTION INTRAVENOUS CONTINUOUS
Status: DISCONTINUED | OUTPATIENT
Start: 2021-01-01 | End: 2021-01-01

## 2021-01-01 RX ORDER — POTASSIUM CHLORIDE 29.8 MG/ML
20 INJECTION INTRAVENOUS
Status: COMPLETED | OUTPATIENT
Start: 2021-01-01 | End: 2021-01-01

## 2021-01-01 RX ORDER — PANTOPRAZOLE SODIUM 40 MG/1
40 TABLET, DELAYED RELEASE ORAL DAILY
Status: DISCONTINUED | OUTPATIENT
Start: 2021-01-01 | End: 2021-01-01 | Stop reason: HOSPADM

## 2021-01-01 RX ORDER — VITAMIN B COMPLEX
1000 TABLET ORAL DAILY
Status: DISCONTINUED | OUTPATIENT
Start: 2021-01-01 | End: 2021-01-01 | Stop reason: HOSPADM

## 2021-01-01 RX ORDER — INSULIN DEGLUDEC INJECTION 100 U/ML
3 INJECTION, SOLUTION SUBCUTANEOUS NIGHTLY
Qty: 10 PEN | Refills: 5 | Status: ON HOLD | OUTPATIENT
Start: 2021-01-01 | End: 2021-01-01 | Stop reason: SDUPTHER

## 2021-01-01 RX ORDER — 0.9 % SODIUM CHLORIDE 0.9 %
30 INTRAVENOUS SOLUTION INTRAVENOUS ONCE
Status: COMPLETED | OUTPATIENT
Start: 2021-01-01 | End: 2021-01-01

## 2021-01-01 RX ORDER — GABAPENTIN 300 MG/1
300 CAPSULE ORAL 3 TIMES DAILY
Status: ON HOLD | COMMUNITY
End: 2021-01-01 | Stop reason: HOSPADM

## 2021-01-01 RX ORDER — METOLAZONE 2.5 MG/1
5 TABLET ORAL DAILY
Status: DISCONTINUED | OUTPATIENT
Start: 2021-01-01 | End: 2021-01-01

## 2021-01-01 RX ORDER — HYDROCODONE BITARTRATE AND ACETAMINOPHEN 5; 325 MG/1; MG/1
1 TABLET ORAL EVERY 8 HOURS PRN
Qty: 9 TABLET | Refills: 0 | Status: SHIPPED | OUTPATIENT
Start: 2021-01-01 | End: 2021-01-01

## 2021-01-01 RX ORDER — ACETAMINOPHEN 650 MG
TABLET, EXTENDED RELEASE ORAL PRN
Status: DISCONTINUED | OUTPATIENT
Start: 2021-01-01 | End: 2021-01-01 | Stop reason: HOSPADM

## 2021-01-01 RX ORDER — FUROSEMIDE 10 MG/ML
100 INJECTION INTRAMUSCULAR; INTRAVENOUS ONCE
Status: COMPLETED | OUTPATIENT
Start: 2021-01-01 | End: 2021-01-01

## 2021-01-01 RX ORDER — TOLVAPTAN 15 MG/1
15 TABLET ORAL DAILY
Status: DISCONTINUED | OUTPATIENT
Start: 2021-01-01 | End: 2021-01-01 | Stop reason: HOSPADM

## 2021-01-01 RX ORDER — LIRAGLUTIDE 6 MG/ML
INJECTION SUBCUTANEOUS
Qty: 2 PEN | Refills: 5 | Status: SHIPPED | OUTPATIENT
Start: 2021-01-01 | End: 2021-01-01

## 2021-01-01 RX ORDER — ATORVASTATIN CALCIUM 80 MG/1
TABLET, FILM COATED ORAL
Qty: 90 TABLET | Refills: 3 | Status: SHIPPED | OUTPATIENT
Start: 2021-01-01

## 2021-01-01 RX ORDER — MORPHINE SULFATE 2 MG/ML
2 INJECTION, SOLUTION INTRAMUSCULAR; INTRAVENOUS EVERY 4 HOURS PRN
Status: DISCONTINUED | OUTPATIENT
Start: 2021-01-01 | End: 2021-01-01

## 2021-01-01 RX ORDER — FUROSEMIDE 10 MG/ML
20 INJECTION INTRAMUSCULAR; INTRAVENOUS ONCE
Status: COMPLETED | OUTPATIENT
Start: 2021-01-01 | End: 2021-01-01

## 2021-01-01 RX ORDER — ONDANSETRON 4 MG/1
4 TABLET, FILM COATED ORAL EVERY 6 HOURS PRN
COMMUNITY

## 2021-01-01 RX ORDER — POLYETHYLENE GLYCOL 3350 17 G/17G
17 POWDER, FOR SOLUTION ORAL DAILY
DISCHARGE
Start: 2021-01-01 | End: 2021-01-01

## 2021-01-01 RX ORDER — KETOROLAC TROMETHAMINE 30 MG/ML
15 INJECTION, SOLUTION INTRAMUSCULAR; INTRAVENOUS ONCE
Status: COMPLETED | OUTPATIENT
Start: 2021-01-01 | End: 2021-01-01

## 2021-01-01 RX ORDER — MIDODRINE HYDROCHLORIDE 5 MG/1
5 TABLET ORAL 3 TIMES DAILY PRN
Status: DISCONTINUED | OUTPATIENT
Start: 2021-01-01 | End: 2021-01-01 | Stop reason: HOSPADM

## 2021-01-01 RX ORDER — QUETIAPINE FUMARATE 25 MG/1
25 TABLET, FILM COATED ORAL NIGHTLY
Qty: 30 TABLET | Refills: 0 | Status: ON HOLD | OUTPATIENT
Start: 2021-01-01 | End: 2021-01-01 | Stop reason: HOSPADM

## 2021-01-01 RX ORDER — ONDANSETRON 2 MG/ML
4 INJECTION INTRAMUSCULAR; INTRAVENOUS EVERY 6 HOURS PRN
Status: DISCONTINUED | OUTPATIENT
Start: 2021-01-01 | End: 2021-01-01 | Stop reason: ALTCHOICE

## 2021-01-01 RX ORDER — DIGOXIN 0.25 MG/ML
250 INJECTION INTRAMUSCULAR; INTRAVENOUS ONCE
Status: COMPLETED | OUTPATIENT
Start: 2021-01-01 | End: 2021-01-01

## 2021-01-01 RX ORDER — PANTOPRAZOLE SODIUM 40 MG/1
40 TABLET, DELAYED RELEASE ORAL
Status: DISCONTINUED | OUTPATIENT
Start: 2021-01-01 | End: 2021-01-01 | Stop reason: HOSPADM

## 2021-01-01 RX ORDER — LANCETS
EACH MISCELLANEOUS
Qty: 150 EACH | Refills: 3 | Status: SHIPPED | OUTPATIENT
Start: 2021-01-01

## 2021-01-01 RX ORDER — LANOLIN ALCOHOL/MO/W.PET/CERES
6 CREAM (GRAM) TOPICAL NIGHTLY PRN
Status: DISCONTINUED | OUTPATIENT
Start: 2021-01-01 | End: 2021-01-01 | Stop reason: HOSPADM

## 2021-01-01 RX ORDER — ALBUMIN (HUMAN) 12.5 G/50ML
25 SOLUTION INTRAVENOUS PRN
Status: DISCONTINUED | OUTPATIENT
Start: 2021-01-01 | End: 2021-01-01 | Stop reason: HOSPADM

## 2021-01-01 RX ORDER — PROMETHAZINE HYDROCHLORIDE 25 MG/1
12.5 TABLET ORAL EVERY 6 HOURS PRN
Status: DISCONTINUED | OUTPATIENT
Start: 2021-01-01 | End: 2021-01-01 | Stop reason: ALTCHOICE

## 2021-01-01 RX ORDER — UMECLIDINIUM 62.5 UG/1
1 AEROSOL, POWDER ORAL DAILY
COMMUNITY

## 2021-01-01 RX ORDER — HEPARIN SODIUM 1000 [USP'U]/ML
80 INJECTION, SOLUTION INTRAVENOUS; SUBCUTANEOUS ONCE
Status: DISCONTINUED | OUTPATIENT
Start: 2021-01-01 | End: 2021-01-01

## 2021-01-01 RX ORDER — PEN NEEDLE, DIABETIC 31 GX5/16"
NEEDLE, DISPOSABLE MISCELLANEOUS
Qty: 200 EACH | Refills: 5 | Status: SHIPPED | OUTPATIENT
Start: 2021-01-01

## 2021-01-01 RX ORDER — NITROGLYCERIN 0.4 MG/1
0.4 TABLET SUBLINGUAL EVERY 5 MIN PRN
Qty: 25 TABLET | Refills: 0 | Status: SHIPPED | OUTPATIENT
Start: 2021-01-01

## 2021-01-01 RX ORDER — TORSEMIDE 100 MG/1
100 TABLET ORAL DAILY
DISCHARGE
Start: 2021-01-01

## 2021-01-01 RX ORDER — CHOLECALCIFEROL (VITAMIN D3) 25 MCG
1000 TABLET ORAL DAILY
Qty: 60 TABLET | Refills: 1 | Status: ON HOLD | OUTPATIENT
Start: 2021-01-01 | End: 2021-01-01 | Stop reason: HOSPADM

## 2021-01-01 RX ORDER — SENNOSIDES 8.6 MG
650 CAPSULE ORAL EVERY 6 HOURS PRN
Status: DISCONTINUED | OUTPATIENT
Start: 2021-01-01 | End: 2021-01-01

## 2021-01-01 RX ORDER — CARVEDILOL 3.12 MG/1
3.12 TABLET ORAL 2 TIMES DAILY WITH MEALS
Status: DISCONTINUED | OUTPATIENT
Start: 2021-01-01 | End: 2021-01-01

## 2021-01-01 RX ORDER — INSULIN LISPRO 100 [IU]/ML
0-6 INJECTION, SOLUTION INTRAVENOUS; SUBCUTANEOUS NIGHTLY
Status: DISCONTINUED | OUTPATIENT
Start: 2021-01-01 | End: 2021-01-01

## 2021-01-01 RX ORDER — DEXTROSE MONOHYDRATE 25 G/50ML
12.5 INJECTION, SOLUTION INTRAVENOUS PRN
Status: DISCONTINUED | OUTPATIENT
Start: 2021-01-01 | End: 2021-01-01

## 2021-01-01 RX ORDER — SODIUM CHLORIDE 9 MG/ML
INJECTION, SOLUTION INTRAVENOUS
Status: DISPENSED
Start: 2021-01-01 | End: 2021-01-01

## 2021-01-01 RX ORDER — METHOCARBAMOL 500 MG/1
500 TABLET, FILM COATED ORAL 2 TIMES DAILY
Status: DISCONTINUED | OUTPATIENT
Start: 2021-01-01 | End: 2021-01-01 | Stop reason: HOSPADM

## 2021-01-01 RX ORDER — POLYETHYLENE GLYCOL 3350 17 G/17G
17 POWDER, FOR SOLUTION ORAL DAILY PRN
Status: DISCONTINUED | OUTPATIENT
Start: 2021-01-01 | End: 2021-01-01 | Stop reason: DRUGHIGH

## 2021-01-01 RX ORDER — TORSEMIDE 100 MG/1
100 TABLET ORAL DAILY
Status: DISCONTINUED | OUTPATIENT
Start: 2021-01-01 | End: 2021-01-01 | Stop reason: HOSPADM

## 2021-01-01 RX ORDER — CHOLECALCIFEROL (VITAMIN D3) 10 MCG
1 TABLET ORAL DAILY
Status: DISCONTINUED | OUTPATIENT
Start: 2021-01-01 | End: 2021-01-01 | Stop reason: HOSPADM

## 2021-01-01 RX ORDER — ATORVASTATIN CALCIUM 40 MG/1
80 TABLET, FILM COATED ORAL DAILY
Status: DISCONTINUED | OUTPATIENT
Start: 2021-01-01 | End: 2021-01-01 | Stop reason: HOSPADM

## 2021-01-01 RX ORDER — CARVEDILOL 3.12 MG/1
TABLET ORAL
Qty: 180 TABLET | Refills: 3 | Status: ON HOLD | OUTPATIENT
Start: 2021-01-01 | End: 2021-01-01 | Stop reason: HOSPADM

## 2021-01-01 RX ORDER — INSULIN LISPRO 100 [IU]/ML
0-18 INJECTION, SOLUTION INTRAVENOUS; SUBCUTANEOUS
Status: DISCONTINUED | OUTPATIENT
Start: 2021-01-01 | End: 2021-01-01

## 2021-01-01 RX ORDER — TORSEMIDE 100 MG/1
50 TABLET ORAL DAILY
Status: DISCONTINUED | OUTPATIENT
Start: 2021-01-01 | End: 2021-01-01

## 2021-01-01 RX ORDER — POTASSIUM CHLORIDE 750 MG/1
40 TABLET, EXTENDED RELEASE ORAL ONCE
Status: COMPLETED | OUTPATIENT
Start: 2021-01-01 | End: 2021-01-01

## 2021-01-01 RX ORDER — GABAPENTIN 300 MG/1
300 CAPSULE ORAL 3 TIMES DAILY
Status: DISCONTINUED | OUTPATIENT
Start: 2021-01-01 | End: 2021-01-01 | Stop reason: HOSPADM

## 2021-01-01 RX ORDER — TORSEMIDE 10 MG/1
50 TABLET ORAL DAILY
Qty: 30 TABLET | Refills: 3 | Status: ON HOLD | OUTPATIENT
Start: 2021-01-01 | End: 2021-01-01 | Stop reason: HOSPADM

## 2021-01-01 RX ORDER — QUETIAPINE FUMARATE 25 MG/1
25 TABLET, FILM COATED ORAL NIGHTLY
Status: DISCONTINUED | OUTPATIENT
Start: 2021-01-01 | End: 2021-01-01 | Stop reason: HOSPADM

## 2021-01-01 RX ORDER — POTASSIUM CHLORIDE 20 MEQ/1
20 TABLET, EXTENDED RELEASE ORAL DAILY
Status: DISCONTINUED | OUTPATIENT
Start: 2021-01-01 | End: 2021-01-01 | Stop reason: HOSPADM

## 2021-01-01 RX ORDER — OXYCODONE HYDROCHLORIDE AND ACETAMINOPHEN 5; 325 MG/1; MG/1
1 TABLET ORAL EVERY 8 HOURS PRN
Status: DISCONTINUED | OUTPATIENT
Start: 2021-01-01 | End: 2021-01-01 | Stop reason: HOSPADM

## 2021-01-01 RX ORDER — METOLAZONE 2.5 MG/1
5 TABLET ORAL ONCE
Status: COMPLETED | OUTPATIENT
Start: 2021-01-01 | End: 2021-01-01

## 2021-01-01 RX ORDER — POTASSIUM CHLORIDE 20 MEQ/1
20 TABLET, EXTENDED RELEASE ORAL ONCE
Status: COMPLETED | OUTPATIENT
Start: 2021-01-01 | End: 2021-01-01

## 2021-01-01 RX ORDER — ALBUTEROL SULFATE 90 UG/1
2 AEROSOL, METERED RESPIRATORY (INHALATION) EVERY 4 HOURS
COMMUNITY
Start: 2021-01-01

## 2021-01-01 RX ORDER — SPIRONOLACTONE 50 MG/1
50 TABLET, FILM COATED ORAL 2 TIMES DAILY
Status: DISCONTINUED | OUTPATIENT
Start: 2021-01-01 | End: 2021-01-01

## 2021-01-01 RX ORDER — SODIUM BICARBONATE 650 MG/1
650 TABLET ORAL 3 TIMES DAILY
Qty: 90 TABLET | Refills: 2
Start: 2021-01-01

## 2021-01-01 RX ORDER — MAGNESIUM SULFATE 1 G/100ML
1000 INJECTION INTRAVENOUS PRN
Status: DISCONTINUED | OUTPATIENT
Start: 2021-01-01 | End: 2021-01-01 | Stop reason: HOSPADM

## 2021-01-01 RX ORDER — POTASSIUM CHLORIDE 7.45 MG/ML
40 INJECTION INTRAVENOUS ONCE
Status: DISCONTINUED | OUTPATIENT
Start: 2021-01-01 | End: 2021-01-01 | Stop reason: SDUPTHER

## 2021-01-01 RX ORDER — DOXYCYCLINE HYCLATE 100 MG
100 TABLET ORAL 2 TIMES DAILY
Qty: 14 TABLET | Refills: 0 | Status: ON HOLD | OUTPATIENT
Start: 2021-01-01 | End: 2021-01-01 | Stop reason: ALTCHOICE

## 2021-01-01 RX ORDER — PROMETHAZINE HYDROCHLORIDE 25 MG/1
12.5 TABLET ORAL EVERY 6 HOURS PRN
Status: DISCONTINUED | OUTPATIENT
Start: 2021-01-01 | End: 2021-01-01 | Stop reason: HOSPADM

## 2021-01-01 RX ORDER — QUETIAPINE FUMARATE 50 MG/1
100 TABLET, FILM COATED ORAL NIGHTLY
Status: ON HOLD | COMMUNITY
End: 2021-01-01 | Stop reason: SDUPTHER

## 2021-01-01 RX ORDER — ASPIRIN 81 MG/1
81 TABLET ORAL DAILY
Status: DISCONTINUED | OUTPATIENT
Start: 2021-01-01 | End: 2021-01-01 | Stop reason: HOSPADM

## 2021-01-01 RX ORDER — SPIRONOLACTONE 25 MG/1
50 TABLET ORAL DAILY
Status: DISCONTINUED | OUTPATIENT
Start: 2021-01-01 | End: 2021-01-01

## 2021-01-01 RX ORDER — BACITRACIN ZINC AND POLYMYXIN B SULFATE 500; 1000 [USP'U]/G; [USP'U]/G
OINTMENT TOPICAL ONCE
Status: CANCELLED | OUTPATIENT
Start: 2021-01-01 | End: 2021-01-01

## 2021-01-01 RX ORDER — METOLAZONE 2.5 MG/1
10 TABLET ORAL ONCE
Status: COMPLETED | OUTPATIENT
Start: 2021-01-01 | End: 2021-01-01

## 2021-01-01 RX ORDER — CHOLECALCIFEROL (VITAMIN D3) 10 MCG
1 TABLET ORAL DAILY
DISCHARGE
Start: 2021-01-01

## 2021-01-01 RX ORDER — INSULIN GLARGINE 100 [IU]/ML
0.15 INJECTION, SOLUTION SUBCUTANEOUS NIGHTLY
Status: DISCONTINUED | OUTPATIENT
Start: 2021-01-01 | End: 2021-01-01 | Stop reason: HOSPADM

## 2021-01-01 RX ORDER — PHYTONADIONE 5 MG/1
5 TABLET ORAL ONCE
Status: COMPLETED | OUTPATIENT
Start: 2021-01-01 | End: 2021-01-01

## 2021-01-01 RX ORDER — IPRATROPIUM BROMIDE AND ALBUTEROL SULFATE 2.5; .5 MG/3ML; MG/3ML
1 SOLUTION RESPIRATORY (INHALATION)
Status: DISCONTINUED | OUTPATIENT
Start: 2021-01-01 | End: 2021-01-01

## 2021-01-01 RX ORDER — FUROSEMIDE 10 MG/ML
80 INJECTION INTRAMUSCULAR; INTRAVENOUS 2 TIMES DAILY
Status: DISCONTINUED | OUTPATIENT
Start: 2021-01-01 | End: 2021-01-01 | Stop reason: HOSPADM

## 2021-01-01 RX ORDER — LIDOCAINE 50 MG/G
OINTMENT TOPICAL ONCE
Status: CANCELLED | OUTPATIENT
Start: 2021-01-01 | End: 2021-01-01

## 2021-01-01 RX ORDER — POTASSIUM CHLORIDE 1500 MG/1
TABLET, EXTENDED RELEASE ORAL
Qty: 720 TABLET | Refills: 1 | Status: ON HOLD | OUTPATIENT
Start: 2021-01-01 | End: 2021-01-01 | Stop reason: HOSPADM

## 2021-01-01 RX ORDER — TORSEMIDE 100 MG/1
100 TABLET ORAL DAILY
Qty: 30 TABLET | Refills: 0 | Status: SHIPPED | OUTPATIENT
Start: 2021-01-01 | End: 2021-01-01 | Stop reason: SDUPTHER

## 2021-01-01 RX ORDER — BUDESONIDE AND FORMOTEROL FUMARATE DIHYDRATE 160; 4.5 UG/1; UG/1
2 AEROSOL RESPIRATORY (INHALATION) 2 TIMES DAILY
Status: DISCONTINUED | OUTPATIENT
Start: 2021-01-01 | End: 2021-01-01

## 2021-01-01 RX ORDER — BLOOD-GLUCOSE METER
EACH MISCELLANEOUS
Qty: 1 KIT | Refills: 0 | Status: SHIPPED | OUTPATIENT
Start: 2021-01-01

## 2021-01-01 RX ORDER — HYDROCODONE BITARTRATE AND ACETAMINOPHEN 5; 325 MG/1; MG/1
1 TABLET ORAL ONCE
Status: COMPLETED | OUTPATIENT
Start: 2021-01-01 | End: 2021-01-01

## 2021-01-01 RX ORDER — CARVEDILOL 3.12 MG/1
3.12 TABLET ORAL 2 TIMES DAILY
Status: DISCONTINUED | OUTPATIENT
Start: 2021-01-01 | End: 2021-01-01 | Stop reason: HOSPADM

## 2021-01-01 RX ADMIN — INSULIN LISPRO 6 UNITS: 100 INJECTION, SOLUTION INTRAVENOUS; SUBCUTANEOUS at 09:54

## 2021-01-01 RX ADMIN — APIXABAN 2.5 MG: 5 TABLET, FILM COATED ORAL at 09:26

## 2021-01-01 RX ADMIN — Medication 15 MG: at 13:32

## 2021-01-01 RX ADMIN — APIXABAN 2.5 MG: 5 TABLET, FILM COATED ORAL at 00:18

## 2021-01-01 RX ADMIN — LAMOTRIGINE 200 MG: 100 TABLET ORAL at 20:25

## 2021-01-01 RX ADMIN — INSULIN LISPRO 2 UNITS: 100 INJECTION, SOLUTION INTRAVENOUS; SUBCUTANEOUS at 17:56

## 2021-01-01 RX ADMIN — Medication 2 PUFF: at 08:00

## 2021-01-01 RX ADMIN — TIOTROPIUM BROMIDE INHALATION SPRAY 2 PUFF: 3.12 SPRAY, METERED RESPIRATORY (INHALATION) at 07:56

## 2021-01-01 RX ADMIN — BUSPIRONE HYDROCHLORIDE 30 MG: 10 TABLET ORAL at 21:22

## 2021-01-01 RX ADMIN — CLOPIDOGREL BISULFATE 75 MG: 75 TABLET ORAL at 08:07

## 2021-01-01 RX ADMIN — ONDANSETRON HYDROCHLORIDE 4 MG: 2 INJECTION, SOLUTION INTRAMUSCULAR; INTRAVENOUS at 06:25

## 2021-01-01 RX ADMIN — HEPARIN SODIUM 5000 UNITS: 5000 INJECTION INTRAVENOUS; SUBCUTANEOUS at 05:28

## 2021-01-01 RX ADMIN — OXYCODONE HYDROCHLORIDE AND ACETAMINOPHEN 1 TABLET: 5; 325 TABLET ORAL at 22:21

## 2021-01-01 RX ADMIN — CEFEPIME HYDROCHLORIDE 1000 MG: 1 INJECTION, POWDER, FOR SOLUTION INTRAMUSCULAR; INTRAVENOUS at 05:55

## 2021-01-01 RX ADMIN — BUSPIRONE HYDROCHLORIDE 30 MG: 10 TABLET ORAL at 08:14

## 2021-01-01 RX ADMIN — TIOTROPIUM BROMIDE INHALATION SPRAY 2 PUFF: 3.12 SPRAY, METERED RESPIRATORY (INHALATION) at 07:01

## 2021-01-01 RX ADMIN — DOXEPIN HYDROCHLORIDE 25 MG: 25 CAPSULE ORAL at 20:14

## 2021-01-01 RX ADMIN — DOXEPIN HYDROCHLORIDE 25 MG: 25 CAPSULE ORAL at 23:44

## 2021-01-01 RX ADMIN — DOXEPIN HYDROCHLORIDE 25 MG: 25 CAPSULE ORAL at 20:08

## 2021-01-01 RX ADMIN — METOPROLOL TARTRATE 25 MG: 25 TABLET, FILM COATED ORAL at 21:11

## 2021-01-01 RX ADMIN — BENZTROPINE MESYLATE 1 MG: 1 TABLET ORAL at 21:08

## 2021-01-01 RX ADMIN — POTASSIUM CHLORIDE 40 MEQ: 1500 TABLET, EXTENDED RELEASE ORAL at 16:02

## 2021-01-01 RX ADMIN — SODIUM CHLORIDE, PRESERVATIVE FREE 10 ML: 5 INJECTION INTRAVENOUS at 09:43

## 2021-01-01 RX ADMIN — BUDESONIDE 500 MCG: 0.5 SUSPENSION RESPIRATORY (INHALATION) at 23:51

## 2021-01-01 RX ADMIN — INSULIN LISPRO 6 UNITS: 100 INJECTION, SOLUTION INTRAVENOUS; SUBCUTANEOUS at 18:38

## 2021-01-01 RX ADMIN — GABAPENTIN 300 MG: 300 CAPSULE ORAL at 15:14

## 2021-01-01 RX ADMIN — METHOCARBAMOL TABLETS 500 MG: 500 TABLET, COATED ORAL at 08:58

## 2021-01-01 RX ADMIN — INSULIN GLARGINE 18 UNITS: 100 INJECTION, SOLUTION SUBCUTANEOUS at 20:29

## 2021-01-01 RX ADMIN — TIOTROPIUM BROMIDE INHALATION SPRAY 2 PUFF: 3.12 SPRAY, METERED RESPIRATORY (INHALATION) at 07:25

## 2021-01-01 RX ADMIN — Medication 15 MG: at 14:49

## 2021-01-01 RX ADMIN — APIXABAN 5 MG: 5 TABLET, FILM COATED ORAL at 08:38

## 2021-01-01 RX ADMIN — BUDESONIDE 500 MCG: 0.5 SUSPENSION RESPIRATORY (INHALATION) at 21:26

## 2021-01-01 RX ADMIN — POTASSIUM CHLORIDE 20 MEQ: 400 INJECTION, SOLUTION INTRAVENOUS at 00:09

## 2021-01-01 RX ADMIN — CARVEDILOL 3.12 MG: 3.12 TABLET, FILM COATED ORAL at 12:20

## 2021-01-01 RX ADMIN — Medication 1000 UNITS: at 10:21

## 2021-01-01 RX ADMIN — OXYCODONE HYDROCHLORIDE AND ACETAMINOPHEN 1 TABLET: 5; 325 TABLET ORAL at 09:39

## 2021-01-01 RX ADMIN — POTASSIUM CHLORIDE 20 MEQ: 1500 TABLET, EXTENDED RELEASE ORAL at 08:08

## 2021-01-01 RX ADMIN — METHOCARBAMOL TABLETS 500 MG: 500 TABLET, COATED ORAL at 09:38

## 2021-01-01 RX ADMIN — Medication 10 ML: at 08:04

## 2021-01-01 RX ADMIN — APIXABAN 5 MG: 5 TABLET, FILM COATED ORAL at 08:07

## 2021-01-01 RX ADMIN — INSULIN LISPRO 6 UNITS: 100 INJECTION, SOLUTION INTRAVENOUS; SUBCUTANEOUS at 12:35

## 2021-01-01 RX ADMIN — GABAPENTIN 300 MG: 300 CAPSULE ORAL at 14:25

## 2021-01-01 RX ADMIN — SODIUM CHLORIDE 1000 ML: 9 INJECTION, SOLUTION INTRAVENOUS at 11:52

## 2021-01-01 RX ADMIN — DEXTROSE MONOHYDRATE 12.5 G: 25 INJECTION, SOLUTION INTRAVENOUS at 06:13

## 2021-01-01 RX ADMIN — BUSPIRONE HYDROCHLORIDE 30 MG: 10 TABLET ORAL at 08:58

## 2021-01-01 RX ADMIN — DEXTROSE MONOHYDRATE 12.5 G: 25 INJECTION, SOLUTION INTRAVENOUS at 06:56

## 2021-01-01 RX ADMIN — METOPROLOL TARTRATE 25 MG: 25 TABLET, FILM COATED ORAL at 09:34

## 2021-01-01 RX ADMIN — ISOSORBIDE DINITRATE 10 MG: 10 TABLET ORAL at 15:17

## 2021-01-01 RX ADMIN — Medication 2 PUFF: at 05:48

## 2021-01-01 RX ADMIN — ACETAMINOPHEN 650 MG: 325 TABLET ORAL at 09:01

## 2021-01-01 RX ADMIN — MIDODRINE HYDROCHLORIDE 5 MG: 5 TABLET ORAL at 08:40

## 2021-01-01 RX ADMIN — FUROSEMIDE 20 MG/HR: 10 INJECTION, SOLUTION INTRAMUSCULAR; INTRAVENOUS at 20:58

## 2021-01-01 RX ADMIN — PANTOPRAZOLE SODIUM 40 MG: 40 TABLET, DELAYED RELEASE ORAL at 09:30

## 2021-01-01 RX ADMIN — METOPROLOL TARTRATE 12.5 MG: 25 TABLET, FILM COATED ORAL at 21:14

## 2021-01-01 RX ADMIN — METOPROLOL TARTRATE 25 MG: 25 TABLET, FILM COATED ORAL at 21:56

## 2021-01-01 RX ADMIN — PANTOPRAZOLE SODIUM 40 MG: 40 TABLET, DELAYED RELEASE ORAL at 16:04

## 2021-01-01 RX ADMIN — PANTOPRAZOLE SODIUM 40 MG: 40 TABLET, DELAYED RELEASE ORAL at 10:37

## 2021-01-01 RX ADMIN — BUSPIRONE HYDROCHLORIDE 30 MG: 10 TABLET ORAL at 21:33

## 2021-01-01 RX ADMIN — BUSPIRONE HYDROCHLORIDE 30 MG: 5 TABLET ORAL at 20:29

## 2021-01-01 RX ADMIN — POTASSIUM CHLORIDE 40 MEQ: 20 TABLET, EXTENDED RELEASE ORAL at 09:52

## 2021-01-01 RX ADMIN — FUROSEMIDE 20 MG/HR: 10 INJECTION, SOLUTION INTRAMUSCULAR; INTRAVENOUS at 21:19

## 2021-01-01 RX ADMIN — ENOXAPARIN SODIUM 30 MG: 30 INJECTION, SOLUTION INTRAVENOUS; SUBCUTANEOUS at 10:39

## 2021-01-01 RX ADMIN — FUROSEMIDE 20 MG/HR: 10 INJECTION, SOLUTION INTRAMUSCULAR; INTRAVENOUS at 12:40

## 2021-01-01 RX ADMIN — INSULIN LISPRO 2 UNITS: 100 INJECTION, SOLUTION INTRAVENOUS; SUBCUTANEOUS at 17:03

## 2021-01-01 RX ADMIN — Medication 10 ML: at 08:06

## 2021-01-01 RX ADMIN — LAMOTRIGINE 200 MG: 100 TABLET ORAL at 09:42

## 2021-01-01 RX ADMIN — ASPIRIN 81 MG: 81 TABLET, COATED ORAL at 08:39

## 2021-01-01 RX ADMIN — ZOLPIDEM TARTRATE 5 MG: 5 TABLET ORAL at 20:19

## 2021-01-01 RX ADMIN — ALBUMIN (HUMAN) 25 G: 0.25 INJECTION, SOLUTION INTRAVENOUS at 22:19

## 2021-01-01 RX ADMIN — POTASSIUM BICARBONATE 40 MEQ: 782 TABLET, EFFERVESCENT ORAL at 20:50

## 2021-01-01 RX ADMIN — ARFORMOTEROL TARTRATE 15 MCG: 15 SOLUTION RESPIRATORY (INHALATION) at 21:25

## 2021-01-01 RX ADMIN — ARIPIPRAZOLE 15 MG: 5 TABLET ORAL at 10:40

## 2021-01-01 RX ADMIN — PANTOPRAZOLE SODIUM 40 MG: 40 TABLET, DELAYED RELEASE ORAL at 09:32

## 2021-01-01 RX ADMIN — ASPIRIN 81 MG: 81 TABLET, FILM COATED ORAL at 10:38

## 2021-01-01 RX ADMIN — DOCUSATE SODIUM 100 MG: 100 CAPSULE ORAL at 09:07

## 2021-01-01 RX ADMIN — LAMOTRIGINE 200 MG: 100 TABLET ORAL at 08:43

## 2021-01-01 RX ADMIN — CARVEDILOL 3.12 MG: 3.12 TABLET, FILM COATED ORAL at 16:42

## 2021-01-01 RX ADMIN — Medication 10 ML: at 08:34

## 2021-01-01 RX ADMIN — INSULIN LISPRO 6 UNITS: 100 INJECTION, SOLUTION INTRAVENOUS; SUBCUTANEOUS at 08:48

## 2021-01-01 RX ADMIN — CARVEDILOL 3.12 MG: 3.12 TABLET, FILM COATED ORAL at 20:39

## 2021-01-01 RX ADMIN — Medication 2 PUFF: at 18:58

## 2021-01-01 RX ADMIN — INSULIN LISPRO 2 UNITS: 100 INJECTION, SOLUTION INTRAVENOUS; SUBCUTANEOUS at 21:37

## 2021-01-01 RX ADMIN — LAMOTRIGINE 200 MG: 100 TABLET ORAL at 09:07

## 2021-01-01 RX ADMIN — LAMOTRIGINE 200 MG: 100 TABLET ORAL at 08:59

## 2021-01-01 RX ADMIN — OXYCODONE HYDROCHLORIDE AND ACETAMINOPHEN 1 TABLET: 5; 325 TABLET ORAL at 11:46

## 2021-01-01 RX ADMIN — FUROSEMIDE 20 MG/HR: 10 INJECTION, SOLUTION INTRAMUSCULAR; INTRAVENOUS at 06:52

## 2021-01-01 RX ADMIN — MIDODRINE HYDROCHLORIDE 10 MG: 10 TABLET ORAL at 17:20

## 2021-01-01 RX ADMIN — INSULIN LISPRO 8 UNITS: 100 INJECTION, SOLUTION INTRAVENOUS; SUBCUTANEOUS at 16:47

## 2021-01-01 RX ADMIN — DOXEPIN HYDROCHLORIDE 25 MG: 25 CAPSULE ORAL at 21:31

## 2021-01-01 RX ADMIN — INSULIN LISPRO 1 UNITS: 100 INJECTION, SOLUTION INTRAVENOUS; SUBCUTANEOUS at 20:49

## 2021-01-01 RX ADMIN — ATORVASTATIN CALCIUM 80 MG: 80 TABLET, FILM COATED ORAL at 20:29

## 2021-01-01 RX ADMIN — Medication 15 MG: at 11:39

## 2021-01-01 RX ADMIN — MORPHINE SULFATE 4 MG: 4 INJECTION, SOLUTION INTRAMUSCULAR; INTRAVENOUS at 16:01

## 2021-01-01 RX ADMIN — CLOPIDOGREL BISULFATE 75 MG: 75 TABLET ORAL at 15:39

## 2021-01-01 RX ADMIN — APIXABAN 5 MG: 5 TABLET, FILM COATED ORAL at 20:29

## 2021-01-01 RX ADMIN — OXYCODONE HYDROCHLORIDE AND ACETAMINOPHEN 1 TABLET: 5; 325 TABLET ORAL at 20:40

## 2021-01-01 RX ADMIN — INSULIN LISPRO 3 UNITS: 100 INJECTION, SOLUTION INTRAVENOUS; SUBCUTANEOUS at 20:20

## 2021-01-01 RX ADMIN — CLOPIDOGREL BISULFATE 75 MG: 75 TABLET ORAL at 08:51

## 2021-01-01 RX ADMIN — INSULIN GLARGINE 18 UNITS: 100 INJECTION, SOLUTION SUBCUTANEOUS at 20:31

## 2021-01-01 RX ADMIN — ATORVASTATIN CALCIUM 80 MG: 80 TABLET, FILM COATED ORAL at 20:45

## 2021-01-01 RX ADMIN — ACETAMINOPHEN 650 MG: 325 TABLET, FILM COATED ORAL at 00:12

## 2021-01-01 RX ADMIN — BUDESONIDE 500 MCG: 0.5 SUSPENSION RESPIRATORY (INHALATION) at 21:36

## 2021-01-01 RX ADMIN — Medication 2 PUFF: at 07:40

## 2021-01-01 RX ADMIN — HYDROCODONE BITARTRATE AND ACETAMINOPHEN 1 TABLET: 5; 325 TABLET ORAL at 16:24

## 2021-01-01 RX ADMIN — LAMOTRIGINE 200 MG: 100 TABLET ORAL at 20:14

## 2021-01-01 RX ADMIN — AMIODARONE HYDROCHLORIDE 200 MG: 200 TABLET ORAL at 08:14

## 2021-01-01 RX ADMIN — PIPERACILLIN SODIUM AND TAZOBACTAM SODIUM 4500 MG: 4; .5 INJECTION, POWDER, LYOPHILIZED, FOR SOLUTION INTRAVENOUS at 13:12

## 2021-01-01 RX ADMIN — POLYETHYLENE GLYCOL (3350) 17 G: 17 POWDER, FOR SOLUTION ORAL at 08:51

## 2021-01-01 RX ADMIN — INSULIN LISPRO 9 UNITS: 100 INJECTION, SOLUTION INTRAVENOUS; SUBCUTANEOUS at 21:07

## 2021-01-01 RX ADMIN — TRAZODONE HYDROCHLORIDE 100 MG: 100 TABLET ORAL at 21:15

## 2021-01-01 RX ADMIN — INSULIN LISPRO 6 UNITS: 100 INJECTION, SOLUTION INTRAVENOUS; SUBCUTANEOUS at 07:56

## 2021-01-01 RX ADMIN — INSULIN LISPRO 3 UNITS: 100 INJECTION, SOLUTION INTRAVENOUS; SUBCUTANEOUS at 16:48

## 2021-01-01 RX ADMIN — CLOPIDOGREL BISULFATE 75 MG: 75 TABLET ORAL at 10:22

## 2021-01-01 RX ADMIN — INSULIN LISPRO 3 UNITS: 100 INJECTION, SOLUTION INTRAVENOUS; SUBCUTANEOUS at 12:24

## 2021-01-01 RX ADMIN — POTASSIUM CHLORIDE 10 MEQ: 7.46 INJECTION, SOLUTION INTRAVENOUS at 11:35

## 2021-01-01 RX ADMIN — FUROSEMIDE 15 MG/HR: 10 INJECTION INTRAMUSCULAR; INTRAVENOUS at 06:58

## 2021-01-01 RX ADMIN — POTASSIUM CHLORIDE 20 MEQ: 1500 TABLET, EXTENDED RELEASE ORAL at 09:06

## 2021-01-01 RX ADMIN — ATORVASTATIN CALCIUM 80 MG: 40 TABLET, FILM COATED ORAL at 21:08

## 2021-01-01 RX ADMIN — POTASSIUM CHLORIDE 20 MEQ: 1500 TABLET, EXTENDED RELEASE ORAL at 10:41

## 2021-01-01 RX ADMIN — POTASSIUM CHLORIDE 20 MEQ: 1500 TABLET, EXTENDED RELEASE ORAL at 10:37

## 2021-01-01 RX ADMIN — BENZTROPINE MESYLATE 1 MG: 1 TABLET ORAL at 21:30

## 2021-01-01 RX ADMIN — APIXABAN 5 MG: 5 TABLET, FILM COATED ORAL at 21:08

## 2021-01-01 RX ADMIN — GABAPENTIN 300 MG: 300 CAPSULE ORAL at 20:14

## 2021-01-01 RX ADMIN — POTASSIUM CHLORIDE 40 MEQ: 1500 TABLET, EXTENDED RELEASE ORAL at 10:08

## 2021-01-01 RX ADMIN — BUSPIRONE HYDROCHLORIDE 30 MG: 10 TABLET ORAL at 09:25

## 2021-01-01 RX ADMIN — CLOPIDOGREL BISULFATE 75 MG: 75 TABLET ORAL at 08:15

## 2021-01-01 RX ADMIN — MIDODRINE HYDROCHLORIDE 10 MG: 10 TABLET ORAL at 12:54

## 2021-01-01 RX ADMIN — MUPIROCIN: 20 OINTMENT TOPICAL at 07:45

## 2021-01-01 RX ADMIN — HYDROCODONE BITARTRATE AND ACETAMINOPHEN 1 TABLET: 5; 325 TABLET ORAL at 11:51

## 2021-01-01 RX ADMIN — ATORVASTATIN CALCIUM 80 MG: 40 TABLET, FILM COATED ORAL at 08:59

## 2021-01-01 RX ADMIN — BUMETANIDE 2 MG: 1 TABLET ORAL at 08:56

## 2021-01-01 RX ADMIN — EPINEPHRINE 1 MG: 0.1 INJECTION, SOLUTION ENDOTRACHEAL; INTRACARDIAC; INTRAVENOUS at 23:02

## 2021-01-01 RX ADMIN — INSULIN LISPRO 1 UNITS: 100 INJECTION, SOLUTION INTRAVENOUS; SUBCUTANEOUS at 18:14

## 2021-01-01 RX ADMIN — INSULIN LISPRO 4 UNITS: 100 INJECTION, SOLUTION INTRAVENOUS; SUBCUTANEOUS at 08:16

## 2021-01-01 RX ADMIN — INSULIN LISPRO 10 UNITS: 100 INJECTION, SOLUTION INTRAVENOUS; SUBCUTANEOUS at 09:15

## 2021-01-01 RX ADMIN — MIDODRINE HYDROCHLORIDE 5 MG: 5 TABLET ORAL at 20:08

## 2021-01-01 RX ADMIN — APIXABAN 2.5 MG: 5 TABLET, FILM COATED ORAL at 21:04

## 2021-01-01 RX ADMIN — INSULIN LISPRO 4 UNITS: 100 INJECTION, SOLUTION INTRAVENOUS; SUBCUTANEOUS at 16:55

## 2021-01-01 RX ADMIN — CARVEDILOL 3.12 MG: 3.12 TABLET, FILM COATED ORAL at 08:50

## 2021-01-01 RX ADMIN — SODIUM CHLORIDE: 9 INJECTION, SOLUTION INTRAVENOUS at 21:07

## 2021-01-01 RX ADMIN — CARVEDILOL 3.12 MG: 3.12 TABLET, FILM COATED ORAL at 20:31

## 2021-01-01 RX ADMIN — POTASSIUM CHLORIDE 10 MEQ: 7.46 INJECTION, SOLUTION INTRAVENOUS at 11:40

## 2021-01-01 RX ADMIN — INSULIN GLARGINE 30 UNITS: 100 INJECTION, SOLUTION SUBCUTANEOUS at 20:37

## 2021-01-01 RX ADMIN — INSULIN LISPRO 3 UNITS: 100 INJECTION, SOLUTION INTRAVENOUS; SUBCUTANEOUS at 00:21

## 2021-01-01 RX ADMIN — PANTOPRAZOLE SODIUM 40 MG: 40 TABLET, DELAYED RELEASE ORAL at 08:57

## 2021-01-01 RX ADMIN — SODIUM CHLORIDE 1500 MG: 900 INJECTION INTRAVENOUS at 03:47

## 2021-01-01 RX ADMIN — QUETIAPINE FUMARATE 25 MG: 25 TABLET ORAL at 20:29

## 2021-01-01 RX ADMIN — SODIUM ZIRCONIUM CYCLOSILICATE 10 G: 10 POWDER, FOR SUSPENSION ORAL at 23:08

## 2021-01-01 RX ADMIN — METOLAZONE 5 MG: 2.5 TABLET ORAL at 09:08

## 2021-01-01 RX ADMIN — PANTOPRAZOLE SODIUM 40 MG: 40 TABLET, DELAYED RELEASE ORAL at 08:50

## 2021-01-01 RX ADMIN — METHOCARBAMOL TABLETS 500 MG: 500 TABLET, COATED ORAL at 12:36

## 2021-01-01 RX ADMIN — ATORVASTATIN CALCIUM 80 MG: 40 TABLET, FILM COATED ORAL at 20:14

## 2021-01-01 RX ADMIN — INSULIN LISPRO 4 UNITS: 100 INJECTION, SOLUTION INTRAVENOUS; SUBCUTANEOUS at 11:43

## 2021-01-01 RX ADMIN — LAMOTRIGINE 200 MG: 100 TABLET ORAL at 07:48

## 2021-01-01 RX ADMIN — ATORVASTATIN CALCIUM 80 MG: 80 TABLET, FILM COATED ORAL at 20:42

## 2021-01-01 RX ADMIN — Medication 400 MG: at 08:38

## 2021-01-01 RX ADMIN — BUMETANIDE 2 MG: 1 TABLET ORAL at 20:50

## 2021-01-01 RX ADMIN — FUROSEMIDE 20 MG/HR: 10 INJECTION, SOLUTION INTRAMUSCULAR; INTRAVENOUS at 02:38

## 2021-01-01 RX ADMIN — HYDROCODONE BITARTRATE AND ACETAMINOPHEN 1 TABLET: 5; 325 TABLET ORAL at 09:08

## 2021-01-01 RX ADMIN — ARIPIPRAZOLE 15 MG: 5 TABLET ORAL at 10:21

## 2021-01-01 RX ADMIN — VANCOMYCIN HYDROCHLORIDE 1000 MG: 1 INJECTION, POWDER, LYOPHILIZED, FOR SOLUTION INTRAVENOUS at 12:43

## 2021-01-01 RX ADMIN — METOPROLOL TARTRATE 50 MG: 50 TABLET, FILM COATED ORAL at 09:52

## 2021-01-01 RX ADMIN — TIOTROPIUM BROMIDE INHALATION SPRAY 2 PUFF: 3.12 SPRAY, METERED RESPIRATORY (INHALATION) at 06:55

## 2021-01-01 RX ADMIN — ATORVASTATIN CALCIUM 80 MG: 80 TABLET, FILM COATED ORAL at 21:56

## 2021-01-01 RX ADMIN — LAMOTRIGINE 200 MG: 100 TABLET ORAL at 12:36

## 2021-01-01 RX ADMIN — POTASSIUM CHLORIDE 20 MEQ: 400 INJECTION, SOLUTION INTRAVENOUS at 00:24

## 2021-01-01 RX ADMIN — Medication 2 PUFF: at 07:10

## 2021-01-01 RX ADMIN — APIXABAN 5 MG: 5 TABLET, FILM COATED ORAL at 08:56

## 2021-01-01 RX ADMIN — INSULIN LISPRO 2 UNITS: 100 INJECTION, SOLUTION INTRAVENOUS; SUBCUTANEOUS at 09:05

## 2021-01-01 RX ADMIN — BUDESONIDE 500 MCG: 0.5 SUSPENSION RESPIRATORY (INHALATION) at 07:50

## 2021-01-01 RX ADMIN — INSULIN LISPRO 2 UNITS: 100 INJECTION, SOLUTION INTRAVENOUS; SUBCUTANEOUS at 12:44

## 2021-01-01 RX ADMIN — POLYETHYLENE GLYCOL (3350) 17 G: 17 POWDER, FOR SOLUTION ORAL at 09:48

## 2021-01-01 RX ADMIN — ASPIRIN 81 MG: 81 TABLET, COATED ORAL at 07:48

## 2021-01-01 RX ADMIN — ATORVASTATIN CALCIUM 80 MG: 80 TABLET, FILM COATED ORAL at 20:26

## 2021-01-01 RX ADMIN — ONDANSETRON HYDROCHLORIDE 4 MG: 2 INJECTION, SOLUTION INTRAMUSCULAR; INTRAVENOUS at 16:02

## 2021-01-01 RX ADMIN — INSULIN GLARGINE 18 UNITS: 100 INJECTION, SOLUTION SUBCUTANEOUS at 20:35

## 2021-01-01 RX ADMIN — ASPIRIN 81 MG: 81 TABLET, COATED ORAL at 12:32

## 2021-01-01 RX ADMIN — INSULIN LISPRO 6 UNITS: 100 INJECTION, SOLUTION INTRAVENOUS; SUBCUTANEOUS at 17:50

## 2021-01-01 RX ADMIN — MICONAZOLE NITRATE: 2 POWDER TOPICAL at 20:15

## 2021-01-01 RX ADMIN — INSULIN LISPRO 6 UNITS: 100 INJECTION, SOLUTION INTRAVENOUS; SUBCUTANEOUS at 17:56

## 2021-01-01 RX ADMIN — DEXTROSE 15 G: 15 GEL ORAL at 06:35

## 2021-01-01 RX ADMIN — DOXEPIN HYDROCHLORIDE 25 MG: 25 CAPSULE ORAL at 20:45

## 2021-01-01 RX ADMIN — APIXABAN 5 MG: 5 TABLET, FILM COATED ORAL at 09:39

## 2021-01-01 RX ADMIN — EPINEPHRINE 1 MG: 0.1 INJECTION, SOLUTION ENDOTRACHEAL; INTRACARDIAC; INTRAVENOUS at 23:06

## 2021-01-01 RX ADMIN — METOPROLOL TARTRATE 50 MG: 50 TABLET, FILM COATED ORAL at 19:51

## 2021-01-01 RX ADMIN — BUSPIRONE HYDROCHLORIDE 30 MG: 5 TABLET ORAL at 20:48

## 2021-01-01 RX ADMIN — SODIUM CHLORIDE 1500 MG: 900 INJECTION INTRAVENOUS at 20:34

## 2021-01-01 RX ADMIN — EPOETIN ALFA-EPBX 3000 UNITS: 3000 INJECTION, SOLUTION INTRAVENOUS; SUBCUTANEOUS at 14:57

## 2021-01-01 RX ADMIN — HEPARIN SODIUM 5000 UNITS: 5000 INJECTION INTRAVENOUS; SUBCUTANEOUS at 05:41

## 2021-01-01 RX ADMIN — LAMOTRIGINE 200 MG: 100 TABLET ORAL at 21:25

## 2021-01-01 RX ADMIN — LAMOTRIGINE 200 MG: 100 TABLET ORAL at 20:30

## 2021-01-01 RX ADMIN — MUPIROCIN: 20 OINTMENT TOPICAL at 08:45

## 2021-01-01 RX ADMIN — LAMOTRIGINE 200 MG: 100 TABLET ORAL at 08:08

## 2021-01-01 RX ADMIN — FUROSEMIDE 80 MG: 10 INJECTION, SOLUTION INTRAMUSCULAR; INTRAVENOUS at 08:40

## 2021-01-01 RX ADMIN — POTASSIUM CHLORIDE AND SODIUM CHLORIDE: 900; 150 INJECTION, SOLUTION INTRAVENOUS at 13:29

## 2021-01-01 RX ADMIN — FUROSEMIDE 20 MG: 10 INJECTION, SOLUTION INTRAMUSCULAR; INTRAVENOUS at 18:48

## 2021-01-01 RX ADMIN — INSULIN LISPRO 1 UNITS: 100 INJECTION, SOLUTION INTRAVENOUS; SUBCUTANEOUS at 20:39

## 2021-01-01 RX ADMIN — INSULIN LISPRO 3 UNITS: 100 INJECTION, SOLUTION INTRAVENOUS; SUBCUTANEOUS at 09:13

## 2021-01-01 RX ADMIN — ALBUMIN (HUMAN) 12.5 G: 0.25 INJECTION, SOLUTION INTRAVENOUS at 06:01

## 2021-01-01 RX ADMIN — SPIRONOLACTONE 50 MG: 50 TABLET ORAL at 08:07

## 2021-01-01 RX ADMIN — Medication 7.5 MG: at 16:13

## 2021-01-01 RX ADMIN — METOPROLOL TARTRATE 25 MG: 25 TABLET, FILM COATED ORAL at 08:07

## 2021-01-01 RX ADMIN — POLYETHYLENE GLYCOL (3350) 17 G: 17 POWDER, FOR SOLUTION ORAL at 12:31

## 2021-01-01 RX ADMIN — HYDROCODONE BITARTRATE AND ACETAMINOPHEN 1 TABLET: 5; 325 TABLET ORAL at 00:37

## 2021-01-01 RX ADMIN — DOXYCYCLINE HYCLATE 100 MG: 100 TABLET, COATED ORAL at 20:31

## 2021-01-01 RX ADMIN — OXYCODONE HYDROCHLORIDE AND ACETAMINOPHEN 1 TABLET: 5; 325 TABLET ORAL at 20:41

## 2021-01-01 RX ADMIN — CEFEPIME HYDROCHLORIDE 1000 MG: 1 INJECTION, POWDER, FOR SOLUTION INTRAMUSCULAR; INTRAVENOUS at 23:03

## 2021-01-01 RX ADMIN — MIDODRINE HYDROCHLORIDE 5 MG: 5 TABLET ORAL at 20:31

## 2021-01-01 RX ADMIN — BENZTROPINE MESYLATE 1 MG: 1 TABLET ORAL at 20:47

## 2021-01-01 RX ADMIN — LAMOTRIGINE 200 MG: 100 TABLET ORAL at 20:26

## 2021-01-01 RX ADMIN — OXYCODONE HYDROCHLORIDE AND ACETAMINOPHEN 2 TABLET: 5; 325 TABLET ORAL at 18:55

## 2021-01-01 RX ADMIN — POTASSIUM CHLORIDE 40 MEQ: 1500 TABLET, EXTENDED RELEASE ORAL at 16:32

## 2021-01-01 RX ADMIN — DOXEPIN HYDROCHLORIDE 25 MG: 25 CAPSULE ORAL at 20:31

## 2021-01-01 RX ADMIN — Medication: at 08:40

## 2021-01-01 RX ADMIN — APIXABAN 5 MG: 5 TABLET, FILM COATED ORAL at 21:56

## 2021-01-01 RX ADMIN — MICONAZOLE NITRATE: 2 POWDER TOPICAL at 08:50

## 2021-01-01 RX ADMIN — CLOPIDOGREL BISULFATE 75 MG: 75 TABLET ORAL at 07:48

## 2021-01-01 RX ADMIN — HYDRALAZINE HYDROCHLORIDE 10 MG: 10 TABLET, FILM COATED ORAL at 15:42

## 2021-01-01 RX ADMIN — ACETAMINOPHEN 650 MG: 325 TABLET ORAL at 21:15

## 2021-01-01 RX ADMIN — BUSPIRONE HYDROCHLORIDE 30 MG: 5 TABLET ORAL at 09:01

## 2021-01-01 RX ADMIN — CLOPIDOGREL BISULFATE 75 MG: 75 TABLET ORAL at 08:35

## 2021-01-01 RX ADMIN — LAMOTRIGINE 200 MG: 100 TABLET ORAL at 09:30

## 2021-01-01 RX ADMIN — LAMOTRIGINE 200 MG: 100 TABLET ORAL at 10:24

## 2021-01-01 RX ADMIN — HYDRALAZINE HYDROCHLORIDE 10 MG: 10 TABLET, FILM COATED ORAL at 20:49

## 2021-01-01 RX ADMIN — Medication 10 ML: at 09:40

## 2021-01-01 RX ADMIN — PANTOPRAZOLE SODIUM 40 MG: 40 TABLET, DELAYED RELEASE ORAL at 14:33

## 2021-01-01 RX ADMIN — EPINEPHRINE 1 MG: 0.1 INJECTION, SOLUTION ENDOTRACHEAL; INTRACARDIAC; INTRAVENOUS at 23:09

## 2021-01-01 RX ADMIN — INSULIN LISPRO 8 UNITS: 100 INJECTION, SOLUTION INTRAVENOUS; SUBCUTANEOUS at 11:28

## 2021-01-01 RX ADMIN — BUPRENORPHINE AND NALOXONE 1 FILM: 8; 2 FILM BUCCAL; SUBLINGUAL at 20:32

## 2021-01-01 RX ADMIN — BUSPIRONE HYDROCHLORIDE 30 MG: 10 TABLET ORAL at 20:14

## 2021-01-01 RX ADMIN — INSULIN LISPRO 6 UNITS: 100 INJECTION, SOLUTION INTRAVENOUS; SUBCUTANEOUS at 09:31

## 2021-01-01 RX ADMIN — TORSEMIDE 100 MG: 100 TABLET ORAL at 11:15

## 2021-01-01 RX ADMIN — Medication 2 PUFF: at 08:11

## 2021-01-01 RX ADMIN — FUROSEMIDE 15 MG/HR: 10 INJECTION INTRAMUSCULAR; INTRAVENOUS at 00:37

## 2021-01-01 RX ADMIN — MICONAZOLE NITRATE: 2 POWDER TOPICAL at 07:52

## 2021-01-01 RX ADMIN — INSULIN LISPRO 3 UNITS: 100 INJECTION, SOLUTION INTRAVENOUS; SUBCUTANEOUS at 08:32

## 2021-01-01 RX ADMIN — METHOCARBAMOL 500 MG: 500 TABLET ORAL at 22:19

## 2021-01-01 RX ADMIN — INSULIN LISPRO 1 UNITS: 100 INJECTION, SOLUTION INTRAVENOUS; SUBCUTANEOUS at 09:51

## 2021-01-01 RX ADMIN — Medication: at 14:15

## 2021-01-01 RX ADMIN — MICONAZOLE NITRATE: 20 POWDER TOPICAL at 22:28

## 2021-01-01 RX ADMIN — ARFORMOTEROL TARTRATE 15 MCG: 15 SOLUTION RESPIRATORY (INHALATION) at 20:43

## 2021-01-01 RX ADMIN — FUROSEMIDE 5 MG/HR: 10 INJECTION INTRAMUSCULAR; INTRAVENOUS at 12:07

## 2021-01-01 RX ADMIN — Medication 10 ML: at 10:40

## 2021-01-01 RX ADMIN — METOPROLOL TARTRATE 25 MG: 25 TABLET, FILM COATED ORAL at 08:58

## 2021-01-01 RX ADMIN — EPINEPHRINE 5 MCG/MIN: 1 INJECTION INTRAMUSCULAR; INTRAVENOUS; SUBCUTANEOUS at 13:40

## 2021-01-01 RX ADMIN — LAMOTRIGINE 200 MG: 100 TABLET ORAL at 10:40

## 2021-01-01 RX ADMIN — POTASSIUM CHLORIDE 20 MEQ: 400 INJECTION, SOLUTION INTRAVENOUS at 11:57

## 2021-01-01 RX ADMIN — PANTOPRAZOLE SODIUM 40 MG: 40 TABLET, DELAYED RELEASE ORAL at 16:49

## 2021-01-01 RX ADMIN — ASPIRIN 81 MG: 81 TABLET, COATED ORAL at 09:07

## 2021-01-01 RX ADMIN — INSULIN LISPRO 2 UNITS: 100 INJECTION, SOLUTION INTRAVENOUS; SUBCUTANEOUS at 05:44

## 2021-01-01 RX ADMIN — BUDESONIDE 500 MCG: 0.5 SUSPENSION RESPIRATORY (INHALATION) at 21:00

## 2021-01-01 RX ADMIN — CARVEDILOL 3.12 MG: 3.12 TABLET, FILM COATED ORAL at 08:16

## 2021-01-01 RX ADMIN — LAMOTRIGINE 200 MG: 100 TABLET ORAL at 21:09

## 2021-01-01 RX ADMIN — FUROSEMIDE 20 MG/HR: 10 INJECTION, SOLUTION INTRAMUSCULAR; INTRAVENOUS at 18:53

## 2021-01-01 RX ADMIN — BUSPIRONE HYDROCHLORIDE 30 MG: 5 TABLET ORAL at 08:56

## 2021-01-01 RX ADMIN — SPIRONOLACTONE 50 MG: 50 TABLET ORAL at 09:53

## 2021-01-01 RX ADMIN — LAMOTRIGINE 200 MG: 100 TABLET ORAL at 09:33

## 2021-01-01 RX ADMIN — Medication 10 ML: at 08:39

## 2021-01-01 RX ADMIN — ARIPIPRAZOLE 15 MG: 10 TABLET ORAL at 08:45

## 2021-01-01 RX ADMIN — METOLAZONE 5 MG: 2.5 TABLET ORAL at 08:58

## 2021-01-01 RX ADMIN — PANTOPRAZOLE SODIUM 40 MG: 40 TABLET, DELAYED RELEASE ORAL at 14:48

## 2021-01-01 RX ADMIN — BUDESONIDE 500 MCG: 0.5 SUSPENSION RESPIRATORY (INHALATION) at 19:45

## 2021-01-01 RX ADMIN — METHOCARBAMOL 500 MG: 500 TABLET ORAL at 21:15

## 2021-01-01 RX ADMIN — Medication 3 MG: at 22:15

## 2021-01-01 RX ADMIN — CARVEDILOL 3.12 MG: 3.12 TABLET, FILM COATED ORAL at 08:40

## 2021-01-01 RX ADMIN — APIXABAN 5 MG: 5 TABLET, FILM COATED ORAL at 21:01

## 2021-01-01 RX ADMIN — FUROSEMIDE 20 MG/HR: 10 INJECTION, SOLUTION INTRAMUSCULAR; INTRAVENOUS at 05:28

## 2021-01-01 RX ADMIN — BUDESONIDE 500 MCG: 0.5 SUSPENSION RESPIRATORY (INHALATION) at 08:47

## 2021-01-01 RX ADMIN — ALBUMIN (HUMAN) 12.5 G: 0.25 INJECTION, SOLUTION INTRAVENOUS at 14:11

## 2021-01-01 RX ADMIN — HYDRALAZINE HYDROCHLORIDE 10 MG: 10 TABLET, FILM COATED ORAL at 06:37

## 2021-01-01 RX ADMIN — ATORVASTATIN CALCIUM 80 MG: 80 TABLET, FILM COATED ORAL at 21:02

## 2021-01-01 RX ADMIN — BENZTROPINE MESYLATE 1 MG: 1 TABLET ORAL at 22:45

## 2021-01-01 RX ADMIN — SODIUM CHLORIDE, PRESERVATIVE FREE 10 ML: 5 INJECTION INTRAVENOUS at 12:09

## 2021-01-01 RX ADMIN — Medication 10 ML: at 20:49

## 2021-01-01 RX ADMIN — CLOPIDOGREL BISULFATE 75 MG: 75 TABLET ORAL at 08:06

## 2021-01-01 RX ADMIN — INSULIN LISPRO 2 UNITS: 100 INJECTION, SOLUTION INTRAVENOUS; SUBCUTANEOUS at 12:35

## 2021-01-01 RX ADMIN — PANTOPRAZOLE SODIUM 40 MG: 40 TABLET, DELAYED RELEASE ORAL at 17:04

## 2021-01-01 RX ADMIN — MIDODRINE HYDROCHLORIDE 5 MG: 5 TABLET ORAL at 08:52

## 2021-01-01 RX ADMIN — POTASSIUM CHLORIDE 40 MEQ: 1500 TABLET, EXTENDED RELEASE ORAL at 08:59

## 2021-01-01 RX ADMIN — Medication 2 PUFF: at 19:15

## 2021-01-01 RX ADMIN — METHOCARBAMOL TABLETS 500 MG: 500 TABLET, COATED ORAL at 08:43

## 2021-01-01 RX ADMIN — LAMOTRIGINE 200 MG: 100 TABLET ORAL at 21:01

## 2021-01-01 RX ADMIN — BUSPIRONE HYDROCHLORIDE 30 MG: 10 TABLET ORAL at 22:43

## 2021-01-01 RX ADMIN — CLOPIDOGREL BISULFATE 75 MG: 75 TABLET ORAL at 08:40

## 2021-01-01 RX ADMIN — BENZTROPINE MESYLATE 1 MG: 1 TABLET ORAL at 20:39

## 2021-01-01 RX ADMIN — SODIUM BICARBONATE 50 MEQ: 84 INJECTION, SOLUTION INTRAVENOUS at 23:03

## 2021-01-01 RX ADMIN — CLOPIDOGREL BISULFATE 75 MG: 75 TABLET ORAL at 09:32

## 2021-01-01 RX ADMIN — SODIUM CHLORIDE 40 ML/HR: 3 INJECTION, SOLUTION INTRAVENOUS at 23:10

## 2021-01-01 RX ADMIN — PANTOPRAZOLE SODIUM 40 MG: 40 TABLET, DELAYED RELEASE ORAL at 08:56

## 2021-01-01 RX ADMIN — POTASSIUM CHLORIDE 20 MEQ: 1500 TABLET, EXTENDED RELEASE ORAL at 08:50

## 2021-01-01 RX ADMIN — INSULIN LISPRO 6 UNITS: 100 INJECTION, SOLUTION INTRAVENOUS; SUBCUTANEOUS at 12:37

## 2021-01-01 RX ADMIN — BUDESONIDE 500 MCG: 0.5 SUSPENSION RESPIRATORY (INHALATION) at 09:09

## 2021-01-01 RX ADMIN — FUROSEMIDE 40 MG: 10 INJECTION, SOLUTION INTRAMUSCULAR; INTRAVENOUS at 12:21

## 2021-01-01 RX ADMIN — Medication 400 MG: at 07:48

## 2021-01-01 RX ADMIN — OXYCODONE HYDROCHLORIDE AND ACETAMINOPHEN 1 TABLET: 5; 325 TABLET ORAL at 08:53

## 2021-01-01 RX ADMIN — Medication 1000 UNITS: at 10:40

## 2021-01-01 RX ADMIN — LAMOTRIGINE 200 MG: 100 TABLET ORAL at 21:08

## 2021-01-01 RX ADMIN — MORPHINE SULFATE 2 MG: 2 INJECTION, SOLUTION INTRAMUSCULAR; INTRAVENOUS at 11:43

## 2021-01-01 RX ADMIN — BUSPIRONE HYDROCHLORIDE 30 MG: 10 TABLET ORAL at 00:17

## 2021-01-01 RX ADMIN — INSULIN GLARGINE 5 UNITS: 100 INJECTION, SOLUTION SUBCUTANEOUS at 21:38

## 2021-01-01 RX ADMIN — METOPROLOL TARTRATE 50 MG: 50 TABLET, FILM COATED ORAL at 21:31

## 2021-01-01 RX ADMIN — VANCOMYCIN HYDROCHLORIDE 1500 MG: 1 INJECTION, POWDER, LYOPHILIZED, FOR SOLUTION INTRAVENOUS at 19:50

## 2021-01-01 RX ADMIN — INSULIN LISPRO 1 UNITS: 100 INJECTION, SOLUTION INTRAVENOUS; SUBCUTANEOUS at 17:12

## 2021-01-01 RX ADMIN — Medication 2 PUFF: at 18:44

## 2021-01-01 RX ADMIN — ATORVASTATIN CALCIUM 80 MG: 80 TABLET, FILM COATED ORAL at 21:31

## 2021-01-01 RX ADMIN — Medication 10 ML: at 22:42

## 2021-01-01 RX ADMIN — INSULIN GLARGINE 30 UNITS: 100 INJECTION, SOLUTION SUBCUTANEOUS at 23:08

## 2021-01-01 RX ADMIN — POTASSIUM CHLORIDE 10 MEQ: 7.46 INJECTION, SOLUTION INTRAVENOUS at 21:59

## 2021-01-01 RX ADMIN — METOPROLOL TARTRATE 12.5 MG: 25 TABLET, FILM COATED ORAL at 21:00

## 2021-01-01 RX ADMIN — BUDESONIDE 500 MCG: 0.5 SUSPENSION RESPIRATORY (INHALATION) at 20:28

## 2021-01-01 RX ADMIN — ASPIRIN 81 MG: 81 TABLET, COATED ORAL at 12:20

## 2021-01-01 RX ADMIN — HYDRALAZINE HYDROCHLORIDE 10 MG: 10 TABLET, FILM COATED ORAL at 23:05

## 2021-01-01 RX ADMIN — GABAPENTIN 300 MG: 300 CAPSULE ORAL at 09:30

## 2021-01-01 RX ADMIN — ARIPIPRAZOLE 15 MG: 10 TABLET ORAL at 10:37

## 2021-01-01 RX ADMIN — APIXABAN 5 MG: 5 TABLET, FILM COATED ORAL at 09:49

## 2021-01-01 RX ADMIN — ACETAMINOPHEN 650 MG: 325 TABLET ORAL at 06:19

## 2021-01-01 RX ADMIN — Medication 15 MG: at 11:08

## 2021-01-01 RX ADMIN — Medication 2 PUFF: at 08:16

## 2021-01-01 RX ADMIN — METOPROLOL TARTRATE 25 MG: 25 TABLET, FILM COATED ORAL at 20:49

## 2021-01-01 RX ADMIN — LAMOTRIGINE 200 MG: 100 TABLET ORAL at 21:30

## 2021-01-01 RX ADMIN — CLOPIDOGREL BISULFATE 75 MG: 75 TABLET ORAL at 08:59

## 2021-01-01 RX ADMIN — OXYCODONE HYDROCHLORIDE AND ACETAMINOPHEN 1 TABLET: 5; 325 TABLET ORAL at 21:02

## 2021-01-01 RX ADMIN — BENZTROPINE MESYLATE 1 MG: 1 TABLET ORAL at 20:31

## 2021-01-01 RX ADMIN — GABAPENTIN 300 MG: 300 CAPSULE ORAL at 08:58

## 2021-01-01 RX ADMIN — ARFORMOTEROL TARTRATE 15 MCG: 15 SOLUTION RESPIRATORY (INHALATION) at 11:47

## 2021-01-01 RX ADMIN — Medication: at 09:58

## 2021-01-01 RX ADMIN — MAGNESIUM GLUCONATE 500 MG ORAL TABLET 400 MG: 500 TABLET ORAL at 08:40

## 2021-01-01 RX ADMIN — SODIUM CHLORIDE, PRESERVATIVE FREE 10 ML: 5 INJECTION INTRAVENOUS at 21:41

## 2021-01-01 RX ADMIN — SODIUM CHLORIDE, PRESERVATIVE FREE 10 ML: 5 INJECTION INTRAVENOUS at 07:55

## 2021-01-01 RX ADMIN — ONDANSETRON HYDROCHLORIDE 4 MG: 2 INJECTION, SOLUTION INTRAMUSCULAR; INTRAVENOUS at 01:09

## 2021-01-01 RX ADMIN — BUSPIRONE HYDROCHLORIDE 30 MG: 5 TABLET ORAL at 20:50

## 2021-01-01 RX ADMIN — MICONAZOLE NITRATE: 2 POWDER TOPICAL at 09:08

## 2021-01-01 RX ADMIN — ATORVASTATIN CALCIUM 80 MG: 80 TABLET, FILM COATED ORAL at 20:49

## 2021-01-01 RX ADMIN — Medication 1000 UNITS: at 12:13

## 2021-01-01 RX ADMIN — PANTOPRAZOLE SODIUM 40 MG: 40 TABLET, DELAYED RELEASE ORAL at 15:14

## 2021-01-01 RX ADMIN — MORPHINE SULFATE 2 MG: 2 INJECTION, SOLUTION INTRAMUSCULAR; INTRAVENOUS at 16:27

## 2021-01-01 RX ADMIN — METOPROLOL TARTRATE 25 MG: 25 TABLET, FILM COATED ORAL at 20:50

## 2021-01-01 RX ADMIN — QUETIAPINE FUMARATE 25 MG: 25 TABLET ORAL at 20:18

## 2021-01-01 RX ADMIN — APIXABAN 2.5 MG: 5 TABLET, FILM COATED ORAL at 21:33

## 2021-01-01 RX ADMIN — BUDESONIDE 500 MCG: 0.5 SUSPENSION RESPIRATORY (INHALATION) at 08:12

## 2021-01-01 RX ADMIN — METOPROLOL TARTRATE 50 MG: 50 TABLET, FILM COATED ORAL at 21:02

## 2021-01-01 RX ADMIN — FUROSEMIDE 20 MG/HR: 10 INJECTION, SOLUTION INTRAMUSCULAR; INTRAVENOUS at 15:22

## 2021-01-01 RX ADMIN — ATORVASTATIN CALCIUM 80 MG: 80 TABLET, FILM COATED ORAL at 20:15

## 2021-01-01 RX ADMIN — DEXTROSE MONOHYDRATE 12.5 G: 25 INJECTION, SOLUTION INTRAVENOUS at 08:30

## 2021-01-01 RX ADMIN — METOPROLOL TARTRATE 50 MG: 50 TABLET, FILM COATED ORAL at 08:58

## 2021-01-01 RX ADMIN — MIDODRINE HYDROCHLORIDE 5 MG: 5 TABLET ORAL at 20:39

## 2021-01-01 RX ADMIN — DOXEPIN HYDROCHLORIDE 25 MG: 25 CAPSULE ORAL at 20:41

## 2021-01-01 RX ADMIN — ATORVASTATIN CALCIUM 80 MG: 40 TABLET, FILM COATED ORAL at 21:33

## 2021-01-01 RX ADMIN — HYDRALAZINE HYDROCHLORIDE 10 MG: 10 TABLET, FILM COATED ORAL at 18:25

## 2021-01-01 RX ADMIN — PANTOPRAZOLE SODIUM 40 MG: 40 TABLET, DELAYED RELEASE ORAL at 08:36

## 2021-01-01 RX ADMIN — SODIUM CHLORIDE, PRESERVATIVE FREE 10 ML: 5 INJECTION INTRAVENOUS at 21:24

## 2021-01-01 RX ADMIN — BUMETANIDE 2 MG: 1 TABLET ORAL at 19:52

## 2021-01-01 RX ADMIN — Medication 10 ML: at 20:43

## 2021-01-01 RX ADMIN — Medication 6 MG: at 21:08

## 2021-01-01 RX ADMIN — MUPIROCIN: 20 OINTMENT TOPICAL at 21:15

## 2021-01-01 RX ADMIN — FUROSEMIDE 20 MG/HR: 10 INJECTION INTRAMUSCULAR; INTRAVENOUS at 03:00

## 2021-01-01 RX ADMIN — PANTOPRAZOLE SODIUM 40 MG: 40 TABLET, DELAYED RELEASE ORAL at 17:35

## 2021-01-01 RX ADMIN — ALBUMIN (HUMAN) 25 G: 0.25 INJECTION, SOLUTION INTRAVENOUS at 04:54

## 2021-01-01 RX ADMIN — INSULIN LISPRO 4 UNITS: 100 INJECTION, SOLUTION INTRAVENOUS; SUBCUTANEOUS at 11:44

## 2021-01-01 RX ADMIN — APIXABAN 5 MG: 5 TABLET, FILM COATED ORAL at 07:52

## 2021-01-01 RX ADMIN — INSULIN LISPRO 1 UNITS: 100 INJECTION, SOLUTION INTRAVENOUS; SUBCUTANEOUS at 06:28

## 2021-01-01 RX ADMIN — MIDODRINE HYDROCHLORIDE 10 MG: 10 TABLET ORAL at 12:25

## 2021-01-01 RX ADMIN — ONDANSETRON HYDROCHLORIDE 4 MG: 2 INJECTION, SOLUTION INTRAMUSCULAR; INTRAVENOUS at 05:46

## 2021-01-01 RX ADMIN — ASPIRIN 81 MG: 81 TABLET, COATED ORAL at 07:52

## 2021-01-01 RX ADMIN — APIXABAN 5 MG: 5 TABLET, FILM COATED ORAL at 21:28

## 2021-01-01 RX ADMIN — INSULIN GLARGINE 18 UNITS: 100 INJECTION, SOLUTION SUBCUTANEOUS at 20:49

## 2021-01-01 RX ADMIN — MORPHINE SULFATE 2 MG: 2 INJECTION, SOLUTION INTRAMUSCULAR; INTRAVENOUS at 07:54

## 2021-01-01 RX ADMIN — APIXABAN 5 MG: 5 TABLET, FILM COATED ORAL at 09:53

## 2021-01-01 RX ADMIN — FUROSEMIDE 20 MG/HR: 10 INJECTION, SOLUTION INTRAMUSCULAR; INTRAVENOUS at 09:07

## 2021-01-01 RX ADMIN — Medication 2 PUFF: at 20:00

## 2021-01-01 RX ADMIN — Medication 2 PUFF: at 08:50

## 2021-01-01 RX ADMIN — BENZTROPINE MESYLATE 1 MG: 1 TABLET ORAL at 20:28

## 2021-01-01 RX ADMIN — ACETAMINOPHEN 650 MG: 325 TABLET ORAL at 11:09

## 2021-01-01 RX ADMIN — POTASSIUM CHLORIDE 20 MEQ: 400 INJECTION, SOLUTION INTRAVENOUS at 13:26

## 2021-01-01 RX ADMIN — SODIUM CHLORIDE: 9 INJECTION, SOLUTION INTRAVENOUS at 08:42

## 2021-01-01 RX ADMIN — BUSPIRONE HYDROCHLORIDE 30 MG: 5 TABLET ORAL at 08:38

## 2021-01-01 RX ADMIN — ATORVASTATIN CALCIUM 20 MG: 10 TABLET, FILM COATED ORAL at 20:34

## 2021-01-01 RX ADMIN — Medication 10 ML: at 20:08

## 2021-01-01 RX ADMIN — CARVEDILOL 3.12 MG: 3.12 TABLET, FILM COATED ORAL at 07:48

## 2021-01-01 RX ADMIN — Medication 2 PUFF: at 18:30

## 2021-01-01 RX ADMIN — INSULIN LISPRO 2 UNITS: 100 INJECTION, SOLUTION INTRAVENOUS; SUBCUTANEOUS at 17:50

## 2021-01-01 RX ADMIN — TIOTROPIUM BROMIDE INHALATION SPRAY 2 PUFF: 3.12 SPRAY, METERED RESPIRATORY (INHALATION) at 08:32

## 2021-01-01 RX ADMIN — BUSPIRONE HYDROCHLORIDE 30 MG: 10 TABLET ORAL at 09:58

## 2021-01-01 RX ADMIN — MIDODRINE HYDROCHLORIDE 10 MG: 10 TABLET ORAL at 13:27

## 2021-01-01 RX ADMIN — BUSPIRONE HYDROCHLORIDE 30 MG: 5 TABLET ORAL at 09:43

## 2021-01-01 RX ADMIN — BUSPIRONE HYDROCHLORIDE 30 MG: 10 TABLET ORAL at 10:24

## 2021-01-01 RX ADMIN — FUROSEMIDE 20 MG/HR: 10 INJECTION, SOLUTION INTRAMUSCULAR; INTRAVENOUS at 17:59

## 2021-01-01 RX ADMIN — Medication 2 PUFF: at 06:57

## 2021-01-01 RX ADMIN — SODIUM BICARBONATE 650 MG: 650 TABLET ORAL at 21:33

## 2021-01-01 RX ADMIN — LAMOTRIGINE 200 MG: 100 TABLET ORAL at 20:57

## 2021-01-01 RX ADMIN — DEXTROSE MONOHYDRATE: 100 INJECTION, SOLUTION INTRAVENOUS at 22:53

## 2021-01-01 RX ADMIN — MICONAZOLE NITRATE: 20 POWDER TOPICAL at 21:11

## 2021-01-01 RX ADMIN — BUPRENORPHINE AND NALOXONE 1 FILM: 8; 2 FILM BUCCAL; SUBLINGUAL at 10:39

## 2021-01-01 RX ADMIN — LAMOTRIGINE 200 MG: 100 TABLET ORAL at 21:40

## 2021-01-01 RX ADMIN — HYDRALAZINE HYDROCHLORIDE 10 MG: 10 TABLET, FILM COATED ORAL at 14:25

## 2021-01-01 RX ADMIN — HYDROCODONE BITARTRATE AND ACETAMINOPHEN 1 TABLET: 5; 325 TABLET ORAL at 22:40

## 2021-01-01 RX ADMIN — SODIUM CHLORIDE: 234 INJECTION, SOLUTION INTRAVENOUS at 11:25

## 2021-01-01 RX ADMIN — MIDODRINE HYDROCHLORIDE 5 MG: 5 TABLET ORAL at 21:16

## 2021-01-01 RX ADMIN — PHYTONADIONE 5 MG: 5 TABLET ORAL at 09:42

## 2021-01-01 RX ADMIN — MIDODRINE HYDROCHLORIDE 10 MG: 10 TABLET ORAL at 09:18

## 2021-01-01 RX ADMIN — POTASSIUM CHLORIDE AND SODIUM CHLORIDE: 900; 150 INJECTION, SOLUTION INTRAVENOUS at 12:35

## 2021-01-01 RX ADMIN — SPIRONOLACTONE 50 MG: 50 TABLET ORAL at 17:55

## 2021-01-01 RX ADMIN — POLYETHYLENE GLYCOL (3350) 17 G: 17 POWDER, FOR SOLUTION ORAL at 08:06

## 2021-01-01 RX ADMIN — MIDODRINE HYDROCHLORIDE 5 MG: 5 TABLET ORAL at 08:08

## 2021-01-01 RX ADMIN — MIDODRINE HYDROCHLORIDE 10 MG: 10 TABLET ORAL at 08:16

## 2021-01-01 RX ADMIN — CLOPIDOGREL BISULFATE 75 MG: 75 TABLET ORAL at 08:03

## 2021-01-01 RX ADMIN — LAMOTRIGINE 200 MG: 100 TABLET ORAL at 08:40

## 2021-01-01 RX ADMIN — Medication 2 PUFF: at 20:10

## 2021-01-01 RX ADMIN — ARFORMOTEROL TARTRATE 15 MCG: 15 SOLUTION RESPIRATORY (INHALATION) at 10:27

## 2021-01-01 RX ADMIN — SODIUM CHLORIDE 250 ML: 9 INJECTION, SOLUTION INTRAVENOUS at 10:33

## 2021-01-01 RX ADMIN — SODIUM CHLORIDE 1500 MG: 900 INJECTION INTRAVENOUS at 14:33

## 2021-01-01 RX ADMIN — ARIPIPRAZOLE 15 MG: 10 TABLET ORAL at 08:49

## 2021-01-01 RX ADMIN — APIXABAN 5 MG: 5 TABLET, FILM COATED ORAL at 10:28

## 2021-01-01 RX ADMIN — Medication: at 10:06

## 2021-01-01 RX ADMIN — INSULIN LISPRO 6 UNITS: 100 INJECTION, SOLUTION INTRAVENOUS; SUBCUTANEOUS at 18:14

## 2021-01-01 RX ADMIN — ARIPIPRAZOLE 15 MG: 5 TABLET ORAL at 08:42

## 2021-01-01 RX ADMIN — BUSPIRONE HYDROCHLORIDE 30 MG: 10 TABLET ORAL at 08:39

## 2021-01-01 RX ADMIN — BUPRENORPHINE AND NALOXONE 1 FILM: 8; 2 FILM BUCCAL; SUBLINGUAL at 20:15

## 2021-01-01 RX ADMIN — METOPROLOL TARTRATE 25 MG: 25 TABLET, FILM COATED ORAL at 20:18

## 2021-01-01 RX ADMIN — PANTOPRAZOLE SODIUM 40 MG: 40 TABLET, DELAYED RELEASE ORAL at 17:28

## 2021-01-01 RX ADMIN — SODIUM BICARBONATE 650 MG: 650 TABLET ORAL at 15:39

## 2021-01-01 RX ADMIN — Medication 2 PUFF: at 20:37

## 2021-01-01 RX ADMIN — BUSPIRONE HYDROCHLORIDE 30 MG: 10 TABLET ORAL at 21:56

## 2021-01-01 RX ADMIN — SODIUM CHLORIDE, PRESERVATIVE FREE 10 ML: 5 INJECTION INTRAVENOUS at 21:29

## 2021-01-01 RX ADMIN — BUSPIRONE HYDROCHLORIDE 30 MG: 10 TABLET ORAL at 09:42

## 2021-01-01 RX ADMIN — MIDODRINE HYDROCHLORIDE 10 MG: 10 TABLET ORAL at 11:08

## 2021-01-01 RX ADMIN — INSULIN LISPRO 2 UNITS: 100 INJECTION, SOLUTION INTRAVENOUS; SUBCUTANEOUS at 20:29

## 2021-01-01 RX ADMIN — OXYCODONE 5 MG: 5 TABLET ORAL at 09:07

## 2021-01-01 RX ADMIN — TORSEMIDE 50 MG: 100 TABLET ORAL at 09:49

## 2021-01-01 RX ADMIN — Medication 10 ML: at 21:14

## 2021-01-01 RX ADMIN — HEPARIN SODIUM 5000 UNITS: 5000 INJECTION INTRAVENOUS; SUBCUTANEOUS at 23:56

## 2021-01-01 RX ADMIN — INSULIN LISPRO 10 UNITS: 100 INJECTION, SOLUTION INTRAVENOUS; SUBCUTANEOUS at 08:18

## 2021-01-01 RX ADMIN — INSULIN LISPRO 6 UNITS: 100 INJECTION, SOLUTION INTRAVENOUS; SUBCUTANEOUS at 17:17

## 2021-01-01 RX ADMIN — NOREPINEPHRINE BITARTRATE 5 MCG/MIN: 1 INJECTION INTRAVENOUS at 18:23

## 2021-01-01 RX ADMIN — TIOTROPIUM BROMIDE INHALATION SPRAY 2 PUFF: 3.12 SPRAY, METERED RESPIRATORY (INHALATION) at 08:11

## 2021-01-01 RX ADMIN — INSULIN LISPRO 6 UNITS: 100 INJECTION, SOLUTION INTRAVENOUS; SUBCUTANEOUS at 21:24

## 2021-01-01 RX ADMIN — INSULIN LISPRO 1 UNITS: 100 INJECTION, SOLUTION INTRAVENOUS; SUBCUTANEOUS at 00:09

## 2021-01-01 RX ADMIN — INSULIN LISPRO 6 UNITS: 100 INJECTION, SOLUTION INTRAVENOUS; SUBCUTANEOUS at 08:10

## 2021-01-01 RX ADMIN — INSULIN LISPRO 6 UNITS: 100 INJECTION, SOLUTION INTRAVENOUS; SUBCUTANEOUS at 11:48

## 2021-01-01 RX ADMIN — BENZTROPINE MESYLATE 1 MG: 1 TABLET ORAL at 21:16

## 2021-01-01 RX ADMIN — SODIUM CHLORIDE 1500 MG: 900 INJECTION INTRAVENOUS at 14:00

## 2021-01-01 RX ADMIN — PANTOPRAZOLE SODIUM 40 MG: 40 TABLET, DELAYED RELEASE ORAL at 17:16

## 2021-01-01 RX ADMIN — PANTOPRAZOLE SODIUM 40 MG: 40 TABLET, DELAYED RELEASE ORAL at 09:00

## 2021-01-01 RX ADMIN — POTASSIUM CHLORIDE 40 MEQ: 1500 TABLET, EXTENDED RELEASE ORAL at 17:04

## 2021-01-01 RX ADMIN — BUMETANIDE 2 MG: 1 TABLET ORAL at 09:30

## 2021-01-01 RX ADMIN — CLOPIDOGREL BISULFATE 75 MG: 75 TABLET ORAL at 10:24

## 2021-01-01 RX ADMIN — ASPIRIN 81 MG: 81 TABLET, COATED ORAL at 08:58

## 2021-01-01 RX ADMIN — INSULIN LISPRO 1 UNITS: 100 INJECTION, SOLUTION INTRAVENOUS; SUBCUTANEOUS at 22:46

## 2021-01-01 RX ADMIN — ASPIRIN 81 MG: 81 TABLET, COATED ORAL at 09:33

## 2021-01-01 RX ADMIN — BUSPIRONE HYDROCHLORIDE 30 MG: 5 TABLET ORAL at 09:30

## 2021-01-01 RX ADMIN — PANTOPRAZOLE SODIUM 40 MG: 40 INJECTION, POWDER, FOR SOLUTION INTRAVENOUS at 08:05

## 2021-01-01 RX ADMIN — POTASSIUM CHLORIDE 20 MEQ: 400 INJECTION, SOLUTION INTRAVENOUS at 22:22

## 2021-01-01 RX ADMIN — PANTOPRAZOLE SODIUM 40 MG: 40 TABLET, DELAYED RELEASE ORAL at 16:32

## 2021-01-01 RX ADMIN — POTASSIUM CHLORIDE 20 MEQ: 400 INJECTION, SOLUTION INTRAVENOUS at 23:43

## 2021-01-01 RX ADMIN — VANCOMYCIN HYDROCHLORIDE 500 MG: 500 INJECTION, POWDER, LYOPHILIZED, FOR SOLUTION INTRAVENOUS at 14:36

## 2021-01-01 RX ADMIN — GABAPENTIN 300 MG: 300 CAPSULE ORAL at 14:16

## 2021-01-01 RX ADMIN — GABAPENTIN 300 MG: 300 CAPSULE ORAL at 07:52

## 2021-01-01 RX ADMIN — FUROSEMIDE 5 MG/HR: 10 INJECTION INTRAMUSCULAR; INTRAVENOUS at 09:26

## 2021-01-01 RX ADMIN — HYDROCODONE BITARTRATE AND ACETAMINOPHEN 1 TABLET: 5; 325 TABLET ORAL at 19:01

## 2021-01-01 RX ADMIN — INSULIN LISPRO 1 UNITS: 100 INJECTION, SOLUTION INTRAVENOUS; SUBCUTANEOUS at 17:18

## 2021-01-01 RX ADMIN — Medication 2000 MG: at 08:07

## 2021-01-01 RX ADMIN — Medication 2 PUFF: at 20:02

## 2021-01-01 RX ADMIN — BUSPIRONE HYDROCHLORIDE 30 MG: 10 TABLET ORAL at 08:49

## 2021-01-01 RX ADMIN — FUROSEMIDE 15 MG/HR: 10 INJECTION INTRAMUSCULAR; INTRAVENOUS at 18:11

## 2021-01-01 RX ADMIN — INSULIN LISPRO 8 UNITS: 100 INJECTION, SOLUTION INTRAVENOUS; SUBCUTANEOUS at 17:05

## 2021-01-01 RX ADMIN — SODIUM CHLORIDE 1500 MG: 900 INJECTION INTRAVENOUS at 20:08

## 2021-01-01 RX ADMIN — APIXABAN 2.5 MG: 5 TABLET, FILM COATED ORAL at 08:14

## 2021-01-01 RX ADMIN — INSULIN LISPRO 4 UNITS: 100 INJECTION, SOLUTION INTRAVENOUS; SUBCUTANEOUS at 17:23

## 2021-01-01 RX ADMIN — ASPIRIN 81 MG: 81 TABLET, COATED ORAL at 08:56

## 2021-01-01 RX ADMIN — APIXABAN 5 MG: 5 TABLET, FILM COATED ORAL at 22:03

## 2021-01-01 RX ADMIN — ARIPIPRAZOLE 15 MG: 5 TABLET ORAL at 08:07

## 2021-01-01 RX ADMIN — INSULIN LISPRO 10 UNITS: 100 INJECTION, SOLUTION INTRAVENOUS; SUBCUTANEOUS at 16:05

## 2021-01-01 RX ADMIN — INSULIN LISPRO 6 UNITS: 100 INJECTION, SOLUTION INTRAVENOUS; SUBCUTANEOUS at 07:54

## 2021-01-01 RX ADMIN — Medication 1000 UNITS: at 09:39

## 2021-01-01 RX ADMIN — ASCORBIC ACID, THIAMINE MONONITRATE,RIBOFLAVIN, NIACINAMIDE, PYRIDOXINE HYDROCHLORIDE, FOLIC ACID, CYANOCOBALAMIN, BIOTIN, CALCIUM PANTOTHENATE, 1 MG: 100; 1.5; 1.7; 20; 10; 1; 6000; 150000; 5 CAPSULE, LIQUID FILLED ORAL at 12:32

## 2021-01-01 RX ADMIN — POTASSIUM CHLORIDE 40 MEQ: 1500 TABLET, EXTENDED RELEASE ORAL at 02:08

## 2021-01-01 RX ADMIN — CLOPIDOGREL BISULFATE 75 MG: 75 TABLET ORAL at 11:08

## 2021-01-01 RX ADMIN — POTASSIUM CHLORIDE 40 MEQ: 1500 TABLET, EXTENDED RELEASE ORAL at 09:38

## 2021-01-01 RX ADMIN — CLOPIDOGREL BISULFATE 75 MG: 75 TABLET ORAL at 09:53

## 2021-01-01 RX ADMIN — BUDESONIDE 500 MCG: 0.5 SUSPENSION RESPIRATORY (INHALATION) at 21:42

## 2021-01-01 RX ADMIN — INSULIN GLARGINE 18 UNITS: 100 INJECTION, SOLUTION SUBCUTANEOUS at 20:25

## 2021-01-01 RX ADMIN — POTASSIUM CHLORIDE 40 MEQ: 20 TABLET, EXTENDED RELEASE ORAL at 20:50

## 2021-01-01 RX ADMIN — MICONAZOLE NITRATE: 2 POWDER TOPICAL at 20:48

## 2021-01-01 RX ADMIN — MIDODRINE HYDROCHLORIDE 5 MG: 5 TABLET ORAL at 10:38

## 2021-01-01 RX ADMIN — METOPROLOL TARTRATE 25 MG: 25 TABLET, FILM COATED ORAL at 07:51

## 2021-01-01 RX ADMIN — CLOPIDOGREL BISULFATE 75 MG: 75 TABLET ORAL at 08:56

## 2021-01-01 RX ADMIN — ATORVASTATIN CALCIUM 80 MG: 80 TABLET, FILM COATED ORAL at 20:50

## 2021-01-01 RX ADMIN — CARVEDILOL 3.12 MG: 3.12 TABLET, FILM COATED ORAL at 16:04

## 2021-01-01 RX ADMIN — MAGNESIUM GLUCONATE 500 MG ORAL TABLET 400 MG: 500 TABLET ORAL at 12:18

## 2021-01-01 RX ADMIN — Medication 15 MG: at 09:59

## 2021-01-01 RX ADMIN — BUSPIRONE HYDROCHLORIDE 30 MG: 10 TABLET ORAL at 22:09

## 2021-01-01 RX ADMIN — CLOPIDOGREL BISULFATE 75 MG: 75 TABLET ORAL at 08:38

## 2021-01-01 RX ADMIN — Medication 2000 MG: at 14:48

## 2021-01-01 RX ADMIN — LAMOTRIGINE 200 MG: 100 TABLET ORAL at 22:03

## 2021-01-01 RX ADMIN — MIDODRINE HYDROCHLORIDE 5 MG: 5 TABLET ORAL at 08:59

## 2021-01-01 RX ADMIN — CLOPIDOGREL BISULFATE 75 MG: 75 TABLET ORAL at 07:52

## 2021-01-01 RX ADMIN — GABAPENTIN 300 MG: 300 CAPSULE ORAL at 19:51

## 2021-01-01 RX ADMIN — TIOTROPIUM BROMIDE INHALATION SPRAY 2 PUFF: 3.12 SPRAY, METERED RESPIRATORY (INHALATION) at 08:16

## 2021-01-01 RX ADMIN — SODIUM CHLORIDE 1500 MG: 900 INJECTION INTRAVENOUS at 02:47

## 2021-01-01 RX ADMIN — FUROSEMIDE 20 MG/HR: 10 INJECTION, SOLUTION INTRAMUSCULAR; INTRAVENOUS at 04:56

## 2021-01-01 RX ADMIN — INSULIN LISPRO 4 UNITS: 100 INJECTION, SOLUTION INTRAVENOUS; SUBCUTANEOUS at 11:38

## 2021-01-01 RX ADMIN — INSULIN LISPRO 1 UNITS: 100 INJECTION, SOLUTION INTRAVENOUS; SUBCUTANEOUS at 20:30

## 2021-01-01 RX ADMIN — MIDODRINE HYDROCHLORIDE 5 MG: 5 TABLET ORAL at 08:57

## 2021-01-01 RX ADMIN — FUROSEMIDE 20 MG/HR: 10 INJECTION, SOLUTION INTRAMUSCULAR; INTRAVENOUS at 15:40

## 2021-01-01 RX ADMIN — BUSPIRONE HYDROCHLORIDE 30 MG: 10 TABLET ORAL at 12:36

## 2021-01-01 RX ADMIN — OXYCODONE HYDROCHLORIDE AND ACETAMINOPHEN 1 TABLET: 5; 325 TABLET ORAL at 00:46

## 2021-01-01 RX ADMIN — OXYCODONE HYDROCHLORIDE AND ACETAMINOPHEN 1 TABLET: 5; 325 TABLET ORAL at 12:31

## 2021-01-01 RX ADMIN — PANTOPRAZOLE SODIUM 40 MG: 40 TABLET, DELAYED RELEASE ORAL at 08:38

## 2021-01-01 RX ADMIN — BUPRENORPHINE AND NALOXONE 1 FILM: 8; 2 FILM BUCCAL; SUBLINGUAL at 20:40

## 2021-01-01 RX ADMIN — POTASSIUM CHLORIDE 20 MEQ: 400 INJECTION, SOLUTION INTRAVENOUS at 10:50

## 2021-01-01 RX ADMIN — PANTOPRAZOLE SODIUM 40 MG: 40 TABLET, DELAYED RELEASE ORAL at 17:00

## 2021-01-01 RX ADMIN — APIXABAN 5 MG: 5 TABLET, FILM COATED ORAL at 09:07

## 2021-01-01 RX ADMIN — INSULIN GLARGINE 30 UNITS: 100 INJECTION, SOLUTION SUBCUTANEOUS at 22:49

## 2021-01-01 RX ADMIN — METHOCARBAMOL TABLETS 500 MG: 500 TABLET, COATED ORAL at 08:07

## 2021-01-01 RX ADMIN — CLOPIDOGREL BISULFATE 75 MG: 75 TABLET ORAL at 09:40

## 2021-01-01 RX ADMIN — LAMOTRIGINE 200 MG: 100 TABLET ORAL at 21:31

## 2021-01-01 RX ADMIN — SPIRONOLACTONE 50 MG: 50 TABLET ORAL at 17:04

## 2021-01-01 RX ADMIN — FUROSEMIDE 20 MG/HR: 10 INJECTION INTRAMUSCULAR; INTRAVENOUS at 00:50

## 2021-01-01 RX ADMIN — LAMOTRIGINE 200 MG: 100 TABLET ORAL at 21:33

## 2021-01-01 RX ADMIN — PANTOPRAZOLE SODIUM 40 MG: 40 TABLET, DELAYED RELEASE ORAL at 15:41

## 2021-01-01 RX ADMIN — POTASSIUM CHLORIDE 40 MEQ: 1500 TABLET, EXTENDED RELEASE ORAL at 09:53

## 2021-01-01 RX ADMIN — INSULIN LISPRO 6 UNITS: 100 INJECTION, SOLUTION INTRAVENOUS; SUBCUTANEOUS at 12:44

## 2021-01-01 RX ADMIN — BENZTROPINE MESYLATE 1 MG: 1 TABLET ORAL at 21:01

## 2021-01-01 RX ADMIN — FUROSEMIDE: 10 INJECTION, SOLUTION INTRAVENOUS at 10:53

## 2021-01-01 RX ADMIN — HYDRALAZINE HYDROCHLORIDE 10 MG: 10 TABLET, FILM COATED ORAL at 14:00

## 2021-01-01 RX ADMIN — MIDODRINE HYDROCHLORIDE 10 MG: 10 TABLET ORAL at 18:21

## 2021-01-01 RX ADMIN — APIXABAN 5 MG: 5 TABLET, FILM COATED ORAL at 20:14

## 2021-01-01 RX ADMIN — MICONAZOLE NITRATE: 2 POWDER TOPICAL at 20:49

## 2021-01-01 RX ADMIN — ARFORMOTEROL TARTRATE 15 MCG: 15 SOLUTION RESPIRATORY (INHALATION) at 07:50

## 2021-01-01 RX ADMIN — SODIUM CHLORIDE, PRESERVATIVE FREE 10 ML: 5 INJECTION INTRAVENOUS at 21:34

## 2021-01-01 RX ADMIN — BUPRENORPHINE AND NALOXONE 1 FILM: 8; 2 FILM BUCCAL; SUBLINGUAL at 08:49

## 2021-01-01 RX ADMIN — Medication 2 PUFF: at 08:32

## 2021-01-01 RX ADMIN — BUSPIRONE HYDROCHLORIDE 30 MG: 10 TABLET ORAL at 08:57

## 2021-01-01 RX ADMIN — INSULIN LISPRO 2 UNITS: 100 INJECTION, SOLUTION INTRAVENOUS; SUBCUTANEOUS at 18:00

## 2021-01-01 RX ADMIN — BUSPIRONE HYDROCHLORIDE 30 MG: 10 TABLET ORAL at 21:01

## 2021-01-01 RX ADMIN — INSULIN HUMAN 10 UNITS: 100 INJECTION, SOLUTION PARENTERAL at 14:50

## 2021-01-01 RX ADMIN — GABAPENTIN 300 MG: 300 CAPSULE ORAL at 20:50

## 2021-01-01 RX ADMIN — INSULIN LISPRO 4 UNITS: 100 INJECTION, SOLUTION INTRAVENOUS; SUBCUTANEOUS at 08:51

## 2021-01-01 RX ADMIN — CLOPIDOGREL BISULFATE 75 MG: 75 TABLET ORAL at 09:34

## 2021-01-01 RX ADMIN — POTASSIUM CHLORIDE 20 MEQ: 400 INJECTION, SOLUTION INTRAVENOUS at 06:38

## 2021-01-01 RX ADMIN — POTASSIUM CHLORIDE 10 MEQ: 7.46 INJECTION, SOLUTION INTRAVENOUS at 10:14

## 2021-01-01 RX ADMIN — CLOPIDOGREL BISULFATE 75 MG: 75 TABLET ORAL at 08:42

## 2021-01-01 RX ADMIN — TIOTROPIUM BROMIDE INHALATION SPRAY 2 PUFF: 3.12 SPRAY, METERED RESPIRATORY (INHALATION) at 07:11

## 2021-01-01 RX ADMIN — BUSPIRONE HYDROCHLORIDE 30 MG: 10 TABLET ORAL at 20:41

## 2021-01-01 RX ADMIN — ONDANSETRON HYDROCHLORIDE 4 MG: 2 INJECTION, SOLUTION INTRAMUSCULAR; INTRAVENOUS at 20:43

## 2021-01-01 RX ADMIN — INSULIN LISPRO 2 UNITS: 100 INJECTION, SOLUTION INTRAVENOUS; SUBCUTANEOUS at 13:03

## 2021-01-01 RX ADMIN — ASPIRIN 81 MG: 81 TABLET, FILM COATED ORAL at 08:02

## 2021-01-01 RX ADMIN — SODIUM CHLORIDE, PRESERVATIVE FREE 10 ML: 5 INJECTION INTRAVENOUS at 00:20

## 2021-01-01 RX ADMIN — HYDRALAZINE HYDROCHLORIDE 10 MG: 10 TABLET, FILM COATED ORAL at 21:31

## 2021-01-01 RX ADMIN — INSULIN GLARGINE 30 UNITS: 100 INJECTION, SOLUTION SUBCUTANEOUS at 20:24

## 2021-01-01 RX ADMIN — ARIPIPRAZOLE 15 MG: 10 TABLET ORAL at 08:03

## 2021-01-01 RX ADMIN — PANTOPRAZOLE SODIUM 40 MG: 40 INJECTION, POWDER, FOR SOLUTION INTRAVENOUS at 08:47

## 2021-01-01 RX ADMIN — BUSPIRONE HYDROCHLORIDE 30 MG: 10 TABLET ORAL at 10:40

## 2021-01-01 RX ADMIN — METOPROLOL TARTRATE 50 MG: 50 TABLET, FILM COATED ORAL at 21:01

## 2021-01-01 RX ADMIN — INSULIN LISPRO 6 UNITS: 100 INJECTION, SOLUTION INTRAVENOUS; SUBCUTANEOUS at 08:57

## 2021-01-01 RX ADMIN — INSULIN LISPRO 2 UNITS: 100 INJECTION, SOLUTION INTRAVENOUS; SUBCUTANEOUS at 20:18

## 2021-01-01 RX ADMIN — BUDESONIDE 500 MCG: 0.5 SUSPENSION RESPIRATORY (INHALATION) at 08:23

## 2021-01-01 RX ADMIN — ATORVASTATIN CALCIUM 20 MG: 10 TABLET, FILM COATED ORAL at 20:19

## 2021-01-01 RX ADMIN — POTASSIUM CHLORIDE 40 MEQ: 20 TABLET, EXTENDED RELEASE ORAL at 13:00

## 2021-01-01 RX ADMIN — PANTOPRAZOLE SODIUM 40 MG: 40 TABLET, DELAYED RELEASE ORAL at 06:53

## 2021-01-01 RX ADMIN — INSULIN GLARGINE 30 UNITS: 100 INJECTION, SOLUTION SUBCUTANEOUS at 21:25

## 2021-01-01 RX ADMIN — DOXEPIN HYDROCHLORIDE 25 MG: 25 CAPSULE ORAL at 21:01

## 2021-01-01 RX ADMIN — Medication 2 PUFF: at 07:25

## 2021-01-01 RX ADMIN — BENZTROPINE MESYLATE 1 MG: 1 TABLET ORAL at 20:50

## 2021-01-01 RX ADMIN — BENZTROPINE MESYLATE 1 MG: 1 TABLET ORAL at 20:18

## 2021-01-01 RX ADMIN — MUPIROCIN: 20 OINTMENT TOPICAL at 09:57

## 2021-01-01 RX ADMIN — CARVEDILOL 3.12 MG: 3.12 TABLET, FILM COATED ORAL at 20:14

## 2021-01-01 RX ADMIN — CLOPIDOGREL BISULFATE 75 MG: 75 TABLET ORAL at 08:57

## 2021-01-01 RX ADMIN — Medication 2 PUFF: at 07:15

## 2021-01-01 RX ADMIN — OXYCODONE HYDROCHLORIDE AND ACETAMINOPHEN 1 TABLET: 5; 325 TABLET ORAL at 20:11

## 2021-01-01 RX ADMIN — ARIPIPRAZOLE 15 MG: 10 TABLET ORAL at 08:13

## 2021-01-01 RX ADMIN — LAMOTRIGINE 200 MG: 100 TABLET ORAL at 08:56

## 2021-01-01 RX ADMIN — PANTOPRAZOLE SODIUM 40 MG: 40 TABLET, DELAYED RELEASE ORAL at 08:08

## 2021-01-01 RX ADMIN — APIXABAN 5 MG: 5 TABLET, FILM COATED ORAL at 21:30

## 2021-01-01 RX ADMIN — SODIUM CHLORIDE 1500 MG: 900 INJECTION INTRAVENOUS at 10:36

## 2021-01-01 RX ADMIN — MICONAZOLE NITRATE: 2 POWDER TOPICAL at 11:00

## 2021-01-01 RX ADMIN — SODIUM CHLORIDE 25 ML: 9 INJECTION, SOLUTION INTRAVENOUS at 09:34

## 2021-01-01 RX ADMIN — INSULIN LISPRO 1 UNITS: 100 INJECTION, SOLUTION INTRAVENOUS; SUBCUTANEOUS at 20:15

## 2021-01-01 RX ADMIN — DIGOXIN 250 MCG: 250 INJECTION, SOLUTION INTRAMUSCULAR; INTRAVENOUS; PARENTERAL at 20:38

## 2021-01-01 RX ADMIN — HYDROCODONE BITARTRATE AND ACETAMINOPHEN 1 TABLET: 5; 325 TABLET ORAL at 17:28

## 2021-01-01 RX ADMIN — MICONAZOLE NITRATE: 2 POWDER TOPICAL at 20:30

## 2021-01-01 RX ADMIN — POTASSIUM CHLORIDE 40 MEQ: 20 TABLET, EXTENDED RELEASE ORAL at 14:41

## 2021-01-01 RX ADMIN — LAMOTRIGINE 200 MG: 100 TABLET ORAL at 22:19

## 2021-01-01 RX ADMIN — APIXABAN 5 MG: 5 TABLET, FILM COATED ORAL at 08:43

## 2021-01-01 RX ADMIN — Medication 10 ML: at 20:29

## 2021-01-01 RX ADMIN — QUETIAPINE FUMARATE 25 MG: 25 TABLET ORAL at 20:49

## 2021-01-01 RX ADMIN — APIXABAN 5 MG: 5 TABLET, FILM COATED ORAL at 20:18

## 2021-01-01 RX ADMIN — DOCUSATE SODIUM 100 MG: 100 CAPSULE ORAL at 21:08

## 2021-01-01 RX ADMIN — OXYCODONE HYDROCHLORIDE AND ACETAMINOPHEN 1 TABLET: 5; 325 TABLET ORAL at 16:47

## 2021-01-01 RX ADMIN — Medication 10 ML: at 08:36

## 2021-01-01 RX ADMIN — QUETIAPINE FUMARATE 25 MG: 25 TABLET ORAL at 19:51

## 2021-01-01 RX ADMIN — PANTOPRAZOLE SODIUM 40 MG: 40 TABLET, DELAYED RELEASE ORAL at 16:34

## 2021-01-01 RX ADMIN — PANTOPRAZOLE SODIUM 40 MG: 40 TABLET, DELAYED RELEASE ORAL at 07:51

## 2021-01-01 RX ADMIN — ARFORMOTEROL TARTRATE 15 MCG: 15 SOLUTION RESPIRATORY (INHALATION) at 21:45

## 2021-01-01 RX ADMIN — LAMOTRIGINE 200 MG: 100 TABLET ORAL at 21:28

## 2021-01-01 RX ADMIN — APIXABAN 5 MG: 5 TABLET, FILM COATED ORAL at 21:02

## 2021-01-01 RX ADMIN — ARFORMOTEROL TARTRATE 15 MCG: 15 SOLUTION RESPIRATORY (INHALATION) at 09:42

## 2021-01-01 RX ADMIN — CLOPIDOGREL BISULFATE 75 MG: 75 TABLET ORAL at 09:42

## 2021-01-01 RX ADMIN — ATORVASTATIN CALCIUM 80 MG: 80 TABLET, FILM COATED ORAL at 20:18

## 2021-01-01 RX ADMIN — METHOCARBAMOL TABLETS 500 MG: 500 TABLET, COATED ORAL at 22:02

## 2021-01-01 RX ADMIN — BENZTROPINE MESYLATE 1 MG: 1 TABLET ORAL at 21:28

## 2021-01-01 RX ADMIN — INSULIN LISPRO 3 UNITS: 100 INJECTION, SOLUTION INTRAVENOUS; SUBCUTANEOUS at 12:37

## 2021-01-01 RX ADMIN — Medication 10 ML: at 08:59

## 2021-01-01 RX ADMIN — MORPHINE SULFATE 2 MG: 2 INJECTION, SOLUTION INTRAMUSCULAR; INTRAVENOUS at 05:46

## 2021-01-01 RX ADMIN — Medication 10 ML: at 08:29

## 2021-01-01 RX ADMIN — INSULIN LISPRO 6 UNITS: 100 INJECTION, SOLUTION INTRAVENOUS; SUBCUTANEOUS at 17:51

## 2021-01-01 RX ADMIN — Medication 10 ML: at 08:33

## 2021-01-01 RX ADMIN — Medication 2 PUFF: at 07:20

## 2021-01-01 RX ADMIN — METHOCARBAMOL 500 MG: 500 TABLET ORAL at 12:19

## 2021-01-01 RX ADMIN — POLYETHYLENE GLYCOL (3350) 17 G: 17 POWDER, FOR SOLUTION ORAL at 08:47

## 2021-01-01 RX ADMIN — INSULIN LISPRO 6 UNITS: 100 INJECTION, SOLUTION INTRAVENOUS; SUBCUTANEOUS at 13:05

## 2021-01-01 RX ADMIN — METOLAZONE 5 MG: 2.5 TABLET ORAL at 16:49

## 2021-01-01 RX ADMIN — VANCOMYCIN HYDROCHLORIDE 1250 MG: 10 INJECTION, POWDER, LYOPHILIZED, FOR SOLUTION INTRAVENOUS at 04:06

## 2021-01-01 RX ADMIN — MORPHINE SULFATE 2 MG: 2 INJECTION, SOLUTION INTRAMUSCULAR; INTRAVENOUS at 21:08

## 2021-01-01 RX ADMIN — TIOTROPIUM BROMIDE INHALATION SPRAY 2 PUFF: 3.12 SPRAY, METERED RESPIRATORY (INHALATION) at 09:07

## 2021-01-01 RX ADMIN — PANTOPRAZOLE SODIUM 40 MG: 40 TABLET, DELAYED RELEASE ORAL at 06:00

## 2021-01-01 RX ADMIN — Medication 10 ML: at 08:42

## 2021-01-01 RX ADMIN — MAGNESIUM GLUCONATE 500 MG ORAL TABLET 400 MG: 500 TABLET ORAL at 08:59

## 2021-01-01 RX ADMIN — CARVEDILOL 3.12 MG: 3.12 TABLET, FILM COATED ORAL at 10:38

## 2021-01-01 RX ADMIN — ASPIRIN 81 MG: 81 TABLET, COATED ORAL at 08:43

## 2021-01-01 RX ADMIN — BUSPIRONE HYDROCHLORIDE 30 MG: 10 TABLET ORAL at 10:21

## 2021-01-01 RX ADMIN — PANTOPRAZOLE SODIUM 40 MG: 40 TABLET, DELAYED RELEASE ORAL at 17:36

## 2021-01-01 RX ADMIN — APIXABAN 2.5 MG: 5 TABLET, FILM COATED ORAL at 22:00

## 2021-01-01 RX ADMIN — Medication 2 PUFF: at 09:00

## 2021-01-01 RX ADMIN — Medication 3 MG: at 22:11

## 2021-01-01 RX ADMIN — MIDODRINE HYDROCHLORIDE 10 MG: 10 TABLET ORAL at 15:39

## 2021-01-01 RX ADMIN — SODIUM CHLORIDE 3183 ML: 9 INJECTION, SOLUTION INTRAVENOUS at 13:08

## 2021-01-01 RX ADMIN — POTASSIUM CHLORIDE 10 MEQ: 7.46 INJECTION, SOLUTION INTRAVENOUS at 13:14

## 2021-01-01 RX ADMIN — METHOCARBAMOL TABLETS 500 MG: 500 TABLET, COATED ORAL at 10:24

## 2021-01-01 RX ADMIN — DEXTROSE MONOHYDRATE 12.5 G: 25 INJECTION, SOLUTION INTRAVENOUS at 20:52

## 2021-01-01 RX ADMIN — BUSPIRONE HYDROCHLORIDE 30 MG: 10 TABLET ORAL at 12:18

## 2021-01-01 RX ADMIN — METOLAZONE 10 MG: 2.5 TABLET ORAL at 12:35

## 2021-01-01 RX ADMIN — MICONAZOLE NITRATE: 2 POWDER TOPICAL at 09:42

## 2021-01-01 RX ADMIN — SPIRONOLACTONE 50 MG: 25 TABLET ORAL at 08:50

## 2021-01-01 RX ADMIN — METOPROLOL TARTRATE 50 MG: 50 TABLET, FILM COATED ORAL at 20:28

## 2021-01-01 RX ADMIN — BUSPIRONE HYDROCHLORIDE 30 MG: 5 TABLET ORAL at 20:30

## 2021-01-01 RX ADMIN — INSULIN LISPRO 3 UNITS: 100 INJECTION, SOLUTION INTRAVENOUS; SUBCUTANEOUS at 17:16

## 2021-01-01 RX ADMIN — SPIRONOLACTONE 25 MG: 25 TABLET, FILM COATED ORAL at 10:22

## 2021-01-01 RX ADMIN — ENOXAPARIN SODIUM 30 MG: 30 INJECTION, SOLUTION INTRAVENOUS; SUBCUTANEOUS at 11:51

## 2021-01-01 RX ADMIN — BUDESONIDE 500 MCG: 0.5 SUSPENSION RESPIRATORY (INHALATION) at 11:47

## 2021-01-01 RX ADMIN — LAMOTRIGINE 200 MG: 100 TABLET ORAL at 08:50

## 2021-01-01 RX ADMIN — MIDODRINE HYDROCHLORIDE 10 MG: 10 TABLET ORAL at 12:35

## 2021-01-01 RX ADMIN — POTASSIUM CHLORIDE 10 MEQ: 7.46 INJECTION, SOLUTION INTRAVENOUS at 01:11

## 2021-01-01 RX ADMIN — FUROSEMIDE 80 MG: 10 INJECTION, SOLUTION INTRAMUSCULAR; INTRAVENOUS at 17:39

## 2021-01-01 RX ADMIN — METOPROLOL TARTRATE 50 MG: 50 TABLET, FILM COATED ORAL at 08:39

## 2021-01-01 RX ADMIN — APIXABAN 5 MG: 5 TABLET, FILM COATED ORAL at 20:26

## 2021-01-01 RX ADMIN — MIDODRINE HYDROCHLORIDE 10 MG: 10 TABLET ORAL at 16:59

## 2021-01-01 RX ADMIN — GABAPENTIN 300 MG: 300 CAPSULE ORAL at 20:24

## 2021-01-01 RX ADMIN — APIXABAN 5 MG: 5 TABLET, FILM COATED ORAL at 20:24

## 2021-01-01 RX ADMIN — GABAPENTIN 300 MG: 300 CAPSULE ORAL at 09:33

## 2021-01-01 RX ADMIN — BENZTROPINE MESYLATE 1 MG: 1 TABLET ORAL at 20:55

## 2021-01-01 RX ADMIN — MIDODRINE HYDROCHLORIDE 10 MG: 10 TABLET ORAL at 08:13

## 2021-01-01 RX ADMIN — INSULIN GLARGINE 30 UNITS: 100 INJECTION, SOLUTION SUBCUTANEOUS at 22:02

## 2021-01-01 RX ADMIN — BENZTROPINE MESYLATE 1 MG: 1 TABLET ORAL at 20:57

## 2021-01-01 RX ADMIN — OXYCODONE HYDROCHLORIDE AND ACETAMINOPHEN 1 TABLET: 5; 325 TABLET ORAL at 06:53

## 2021-01-01 RX ADMIN — ACETAMINOPHEN 650 MG: 325 TABLET ORAL at 14:19

## 2021-01-01 RX ADMIN — INSULIN LISPRO 1 UNITS: 100 INJECTION, SOLUTION INTRAVENOUS; SUBCUTANEOUS at 21:58

## 2021-01-01 RX ADMIN — ISOSORBIDE DINITRATE 10 MG: 10 TABLET ORAL at 21:01

## 2021-01-01 RX ADMIN — FUROSEMIDE 20 MG/HR: 10 INJECTION, SOLUTION INTRAMUSCULAR; INTRAVENOUS at 23:55

## 2021-01-01 RX ADMIN — LAMOTRIGINE 200 MG: 100 TABLET ORAL at 21:59

## 2021-01-01 RX ADMIN — TIOTROPIUM BROMIDE INHALATION SPRAY 2 PUFF: 3.12 SPRAY, METERED RESPIRATORY (INHALATION) at 07:02

## 2021-01-01 RX ADMIN — INSULIN LISPRO 6 UNITS: 100 INJECTION, SOLUTION INTRAVENOUS; SUBCUTANEOUS at 13:03

## 2021-01-01 RX ADMIN — MIDODRINE HYDROCHLORIDE 10 MG: 10 TABLET ORAL at 16:42

## 2021-01-01 RX ADMIN — BENZTROPINE MESYLATE 1 MG: 1 TABLET ORAL at 19:51

## 2021-01-01 RX ADMIN — TIOTROPIUM BROMIDE INHALATION SPRAY 2 PUFF: 3.12 SPRAY, METERED RESPIRATORY (INHALATION) at 12:32

## 2021-01-01 RX ADMIN — INSULIN LISPRO 6 UNITS: 100 INJECTION, SOLUTION INTRAVENOUS; SUBCUTANEOUS at 20:37

## 2021-01-01 RX ADMIN — Medication 10 ML: at 20:48

## 2021-01-01 RX ADMIN — ONDANSETRON HYDROCHLORIDE 4 MG: 2 INJECTION, SOLUTION INTRAMUSCULAR; INTRAVENOUS at 14:08

## 2021-01-01 RX ADMIN — METOPROLOL TARTRATE 25 MG: 25 TABLET, FILM COATED ORAL at 20:19

## 2021-01-01 RX ADMIN — TIOTROPIUM BROMIDE INHALATION SPRAY 2 PUFF: 3.12 SPRAY, METERED RESPIRATORY (INHALATION) at 06:57

## 2021-01-01 RX ADMIN — Medication 10 ML: at 08:58

## 2021-01-01 RX ADMIN — TIOTROPIUM BROMIDE INHALATION SPRAY 2 PUFF: 3.12 SPRAY, METERED RESPIRATORY (INHALATION) at 07:27

## 2021-01-01 RX ADMIN — BUMETANIDE 2 MG: 1 TABLET ORAL at 09:26

## 2021-01-01 RX ADMIN — Medication 10 ML: at 21:16

## 2021-01-01 RX ADMIN — BENZTROPINE MESYLATE 0.5 MG: 1 TABLET ORAL at 20:08

## 2021-01-01 RX ADMIN — APIXABAN 2.5 MG: 5 TABLET, FILM COATED ORAL at 08:06

## 2021-01-01 RX ADMIN — ASPIRIN 81 MG: 81 TABLET, COATED ORAL at 09:30

## 2021-01-01 RX ADMIN — METOPROLOL TARTRATE 50 MG: 50 TABLET, FILM COATED ORAL at 09:30

## 2021-01-01 RX ADMIN — Medication 10 ML: at 08:46

## 2021-01-01 RX ADMIN — PANTOPRAZOLE SODIUM 40 MG: 40 TABLET, DELAYED RELEASE ORAL at 08:43

## 2021-01-01 RX ADMIN — CARVEDILOL 3.12 MG: 3.12 TABLET, FILM COATED ORAL at 17:35

## 2021-01-01 RX ADMIN — PANTOPRAZOLE SODIUM 40 MG: 40 INJECTION, POWDER, FOR SOLUTION INTRAVENOUS at 20:59

## 2021-01-01 RX ADMIN — LAMOTRIGINE 200 MG: 100 TABLET ORAL at 20:08

## 2021-01-01 RX ADMIN — Medication 10 ML: at 08:50

## 2021-01-01 RX ADMIN — BUSPIRONE HYDROCHLORIDE 30 MG: 5 TABLET ORAL at 20:25

## 2021-01-01 RX ADMIN — HYDRALAZINE HYDROCHLORIDE 10 MG: 10 TABLET, FILM COATED ORAL at 20:56

## 2021-01-01 RX ADMIN — APIXABAN 2.5 MG: 5 TABLET, FILM COATED ORAL at 08:55

## 2021-01-01 RX ADMIN — CALCIUM CHLORIDE 1000 MG: 100 INJECTION, SOLUTION INTRAVENOUS; INTRAVENTRICULAR at 23:05

## 2021-01-01 RX ADMIN — INSULIN LISPRO 2 UNITS: 100 INJECTION, SOLUTION INTRAVENOUS; SUBCUTANEOUS at 08:57

## 2021-01-01 RX ADMIN — APIXABAN 5 MG: 5 TABLET, FILM COATED ORAL at 20:19

## 2021-01-01 RX ADMIN — APIXABAN 5 MG: 5 TABLET, FILM COATED ORAL at 12:25

## 2021-01-01 RX ADMIN — MORPHINE SULFATE 2 MG: 2 INJECTION, SOLUTION INTRAMUSCULAR; INTRAVENOUS at 09:25

## 2021-01-01 RX ADMIN — FUROSEMIDE 100 MG: 10 INJECTION, SOLUTION INTRAMUSCULAR; INTRAVENOUS at 18:40

## 2021-01-01 RX ADMIN — INSULIN LISPRO 1 UNITS: 100 INJECTION, SOLUTION INTRAVENOUS; SUBCUTANEOUS at 22:00

## 2021-01-01 RX ADMIN — MIDODRINE HYDROCHLORIDE 10 MG: 10 TABLET ORAL at 12:31

## 2021-01-01 RX ADMIN — Medication 10 ML: at 20:30

## 2021-01-01 RX ADMIN — APIXABAN 5 MG: 5 TABLET, FILM COATED ORAL at 21:25

## 2021-01-01 RX ADMIN — Medication 10 ML: at 20:20

## 2021-01-01 RX ADMIN — ZOLPIDEM TARTRATE 5 MG: 5 TABLET ORAL at 23:03

## 2021-01-01 RX ADMIN — ASCORBIC ACID, THIAMINE MONONITRATE,RIBOFLAVIN, NIACINAMIDE, PYRIDOXINE HYDROCHLORIDE, FOLIC ACID, CYANOCOBALAMIN, BIOTIN, CALCIUM PANTOTHENATE, 1 MG: 100; 1.5; 1.7; 20; 10; 1; 6000; 150000; 5 CAPSULE, LIQUID FILLED ORAL at 14:34

## 2021-01-01 RX ADMIN — HEPARIN SODIUM 5000 UNITS: 5000 INJECTION INTRAVENOUS; SUBCUTANEOUS at 13:49

## 2021-01-01 RX ADMIN — ARFORMOTEROL TARTRATE 15 MCG: 15 SOLUTION RESPIRATORY (INHALATION) at 20:28

## 2021-01-01 RX ADMIN — MORPHINE SULFATE 2 MG: 2 INJECTION, SOLUTION INTRAMUSCULAR; INTRAVENOUS at 21:24

## 2021-01-01 RX ADMIN — ATORVASTATIN CALCIUM 80 MG: 80 TABLET, FILM COATED ORAL at 19:51

## 2021-01-01 RX ADMIN — BENZTROPINE MESYLATE 1 MG: 1 TABLET ORAL at 21:40

## 2021-01-01 RX ADMIN — HYDRALAZINE HYDROCHLORIDE 10 MG: 10 TABLET, FILM COATED ORAL at 06:54

## 2021-01-01 RX ADMIN — Medication 400 MG: at 09:29

## 2021-01-01 RX ADMIN — ASCORBIC ACID, THIAMINE MONONITRATE,RIBOFLAVIN, NIACINAMIDE, PYRIDOXINE HYDROCHLORIDE, FOLIC ACID, CYANOCOBALAMIN, BIOTIN, CALCIUM PANTOTHENATE, 1 MG: 100; 1.5; 1.7; 20; 10; 1; 6000; 150000; 5 CAPSULE, LIQUID FILLED ORAL at 12:24

## 2021-01-01 RX ADMIN — TORSEMIDE 100 MG: 100 TABLET ORAL at 08:55

## 2021-01-01 RX ADMIN — ARFORMOTEROL TARTRATE 15 MCG: 15 SOLUTION RESPIRATORY (INHALATION) at 23:47

## 2021-01-01 RX ADMIN — MICONAZOLE NITRATE: 20 POWDER TOPICAL at 08:06

## 2021-01-01 RX ADMIN — APIXABAN 5 MG: 5 TABLET, FILM COATED ORAL at 09:30

## 2021-01-01 RX ADMIN — Medication 2 PUFF: at 06:54

## 2021-01-01 RX ADMIN — OXYCODONE 5 MG: 5 TABLET ORAL at 12:21

## 2021-01-01 RX ADMIN — MIDODRINE HYDROCHLORIDE 10 MG: 10 TABLET ORAL at 10:12

## 2021-01-01 RX ADMIN — SPIRONOLACTONE 50 MG: 50 TABLET ORAL at 09:32

## 2021-01-01 RX ADMIN — ATORVASTATIN CALCIUM 80 MG: 80 TABLET, FILM COATED ORAL at 22:02

## 2021-01-01 RX ADMIN — METOPROLOL TARTRATE 25 MG: 25 TABLET, FILM COATED ORAL at 09:07

## 2021-01-01 RX ADMIN — PANTOPRAZOLE SODIUM 40 MG: 40 TABLET, DELAYED RELEASE ORAL at 16:02

## 2021-01-01 RX ADMIN — MIDODRINE HYDROCHLORIDE 10 MG: 10 TABLET ORAL at 08:55

## 2021-01-01 RX ADMIN — CLOPIDOGREL BISULFATE 75 MG: 75 TABLET ORAL at 09:30

## 2021-01-01 RX ADMIN — OXYCODONE 5 MG: 5 TABLET ORAL at 21:15

## 2021-01-01 RX ADMIN — Medication 10 ML: at 20:15

## 2021-01-01 RX ADMIN — SODIUM CHLORIDE 250 ML: 9 INJECTION, SOLUTION INTRAVENOUS at 20:34

## 2021-01-01 RX ADMIN — LAMOTRIGINE 200 MG: 100 TABLET ORAL at 00:17

## 2021-01-01 RX ADMIN — METHOCARBAMOL 500 MG: 500 TABLET ORAL at 08:40

## 2021-01-01 RX ADMIN — HYDROMORPHONE HYDROCHLORIDE 1 MG: 1 INJECTION, SOLUTION INTRAMUSCULAR; INTRAVENOUS; SUBCUTANEOUS at 20:10

## 2021-01-01 RX ADMIN — INSULIN LISPRO 1 UNITS: 100 INJECTION, SOLUTION INTRAVENOUS; SUBCUTANEOUS at 02:39

## 2021-01-01 RX ADMIN — BENZTROPINE MESYLATE 1 MG: 1 TABLET ORAL at 20:26

## 2021-01-01 RX ADMIN — APIXABAN 5 MG: 5 TABLET, FILM COATED ORAL at 00:12

## 2021-01-01 RX ADMIN — PANTOPRAZOLE SODIUM 40 MG: 40 TABLET, DELAYED RELEASE ORAL at 06:54

## 2021-01-01 RX ADMIN — ARIPIPRAZOLE 15 MG: 10 TABLET ORAL at 09:25

## 2021-01-01 RX ADMIN — TORSEMIDE 100 MG: 100 TABLET ORAL at 09:42

## 2021-01-01 RX ADMIN — METOPROLOL TARTRATE 50 MG: 50 TABLET, FILM COATED ORAL at 20:49

## 2021-01-01 RX ADMIN — ARIPIPRAZOLE 15 MG: 10 TABLET ORAL at 15:38

## 2021-01-01 RX ADMIN — ATORVASTATIN CALCIUM 80 MG: 40 TABLET, FILM COATED ORAL at 20:08

## 2021-01-01 RX ADMIN — DEXTROSE MONOHYDRATE 12.5 G: 25 INJECTION, SOLUTION INTRAVENOUS at 03:06

## 2021-01-01 RX ADMIN — FUROSEMIDE 80 MG: 10 INJECTION, SOLUTION INTRAMUSCULAR; INTRAVENOUS at 13:39

## 2021-01-01 RX ADMIN — LAMOTRIGINE 200 MG: 100 TABLET ORAL at 12:25

## 2021-01-01 RX ADMIN — Medication 10 ML: at 08:43

## 2021-01-01 RX ADMIN — INSULIN LISPRO 3 UNITS: 100 INJECTION, SOLUTION INTRAVENOUS; SUBCUTANEOUS at 22:49

## 2021-01-01 RX ADMIN — Medication 2000 MG: at 00:47

## 2021-01-01 RX ADMIN — CARVEDILOL 3.12 MG: 3.12 TABLET, FILM COATED ORAL at 08:57

## 2021-01-01 RX ADMIN — METOPROLOL TARTRATE 50 MG: 50 TABLET, FILM COATED ORAL at 10:22

## 2021-01-01 RX ADMIN — INSULIN LISPRO 6 UNITS: 100 INJECTION, SOLUTION INTRAVENOUS; SUBCUTANEOUS at 12:58

## 2021-01-01 RX ADMIN — INSULIN LISPRO 6 UNITS: 100 INJECTION, SOLUTION INTRAVENOUS; SUBCUTANEOUS at 13:14

## 2021-01-01 RX ADMIN — SODIUM CHLORIDE 1500 MG: 900 INJECTION INTRAVENOUS at 08:46

## 2021-01-01 RX ADMIN — ATORVASTATIN CALCIUM 80 MG: 40 TABLET, FILM COATED ORAL at 22:00

## 2021-01-01 RX ADMIN — BENZTROPINE MESYLATE 1 MG: 1 TABLET ORAL at 20:14

## 2021-01-01 RX ADMIN — APIXABAN 5 MG: 5 TABLET, FILM COATED ORAL at 10:21

## 2021-01-01 RX ADMIN — DOXEPIN HYDROCHLORIDE 25 MG: 25 CAPSULE ORAL at 22:43

## 2021-01-01 RX ADMIN — MIDODRINE HYDROCHLORIDE 5 MG: 5 TABLET ORAL at 09:58

## 2021-01-01 RX ADMIN — BUSPIRONE HYDROCHLORIDE 30 MG: 10 TABLET ORAL at 08:43

## 2021-01-01 RX ADMIN — Medication 1000 UNITS: at 10:24

## 2021-01-01 RX ADMIN — SODIUM CHLORIDE 1500 MG: 900 INJECTION INTRAVENOUS at 14:43

## 2021-01-01 RX ADMIN — MIDODRINE HYDROCHLORIDE 10 MG: 10 TABLET ORAL at 18:03

## 2021-01-01 RX ADMIN — POLYETHYLENE GLYCOL (3350) 17 G: 17 POWDER, FOR SOLUTION ORAL at 12:24

## 2021-01-01 RX ADMIN — HYDRALAZINE HYDROCHLORIDE 10 MG: 10 TABLET, FILM COATED ORAL at 06:18

## 2021-01-01 RX ADMIN — INSULIN GLARGINE 18 UNITS: 100 INJECTION, SOLUTION SUBCUTANEOUS at 20:15

## 2021-01-01 RX ADMIN — PANTOPRAZOLE SODIUM 40 MG: 40 TABLET, DELAYED RELEASE ORAL at 07:49

## 2021-01-01 RX ADMIN — INSULIN GLARGINE 18 UNITS: 100 INJECTION, SOLUTION SUBCUTANEOUS at 20:18

## 2021-01-01 RX ADMIN — PANTOPRAZOLE SODIUM 40 MG: 40 TABLET, DELAYED RELEASE ORAL at 06:04

## 2021-01-01 RX ADMIN — PANTOPRAZOLE SODIUM 40 MG: 40 INJECTION, POWDER, FOR SOLUTION INTRAVENOUS at 08:29

## 2021-01-01 RX ADMIN — LAMOTRIGINE 200 MG: 100 TABLET ORAL at 08:38

## 2021-01-01 RX ADMIN — POTASSIUM CHLORIDE 40 MEQ: 1500 TABLET, EXTENDED RELEASE ORAL at 17:56

## 2021-01-01 RX ADMIN — INSULIN LISPRO 10 UNITS: 100 INJECTION, SOLUTION INTRAVENOUS; SUBCUTANEOUS at 11:38

## 2021-01-01 RX ADMIN — PANTOPRAZOLE SODIUM 40 MG: 40 TABLET, DELAYED RELEASE ORAL at 06:32

## 2021-01-01 RX ADMIN — BUDESONIDE 500 MCG: 0.5 SUSPENSION RESPIRATORY (INHALATION) at 20:48

## 2021-01-01 RX ADMIN — INSULIN LISPRO 2 UNITS: 100 INJECTION, SOLUTION INTRAVENOUS; SUBCUTANEOUS at 09:58

## 2021-01-01 RX ADMIN — INSULIN LISPRO 2 UNITS: 100 INJECTION, SOLUTION INTRAVENOUS; SUBCUTANEOUS at 17:20

## 2021-01-01 RX ADMIN — Medication 400 MG: at 08:58

## 2021-01-01 RX ADMIN — PANTOPRAZOLE SODIUM 40 MG: 40 INJECTION, POWDER, FOR SOLUTION INTRAVENOUS at 07:45

## 2021-01-01 RX ADMIN — LAMOTRIGINE 200 MG: 100 TABLET ORAL at 09:38

## 2021-01-01 RX ADMIN — ARIPIPRAZOLE 15 MG: 10 TABLET ORAL at 12:19

## 2021-01-01 RX ADMIN — POTASSIUM CHLORIDE 10 MEQ: 7.46 INJECTION, SOLUTION INTRAVENOUS at 22:33

## 2021-01-01 RX ADMIN — FUROSEMIDE 20 MG/HR: 10 INJECTION INTRAMUSCULAR; INTRAVENOUS at 12:31

## 2021-01-01 RX ADMIN — PANTOPRAZOLE SODIUM 40 MG: 40 INJECTION, POWDER, FOR SOLUTION INTRAVENOUS at 09:50

## 2021-01-01 RX ADMIN — TORSEMIDE 100 MG: 100 TABLET ORAL at 08:49

## 2021-01-01 RX ADMIN — METHOCARBAMOL TABLETS 500 MG: 500 TABLET, COATED ORAL at 21:30

## 2021-01-01 RX ADMIN — HEPARIN SODIUM 5000 UNITS: 5000 INJECTION INTRAVENOUS; SUBCUTANEOUS at 22:49

## 2021-01-01 RX ADMIN — APIXABAN 5 MG: 5 TABLET, FILM COATED ORAL at 20:50

## 2021-01-01 RX ADMIN — HEPARIN SODIUM 5000 UNITS: 5000 INJECTION INTRAVENOUS; SUBCUTANEOUS at 14:30

## 2021-01-01 RX ADMIN — BUPRENORPHINE AND NALOXONE 1 FILM: 8; 2 FILM BUCCAL; SUBLINGUAL at 08:08

## 2021-01-01 RX ADMIN — TIOTROPIUM BROMIDE INHALATION SPRAY 2 PUFF: 3.12 SPRAY, METERED RESPIRATORY (INHALATION) at 08:34

## 2021-01-01 RX ADMIN — DEXTROSE 15 G: 15 GEL ORAL at 06:37

## 2021-01-01 RX ADMIN — SPIRONOLACTONE 50 MG: 50 TABLET ORAL at 17:38

## 2021-01-01 RX ADMIN — BUSPIRONE HYDROCHLORIDE 30 MG: 5 TABLET ORAL at 20:15

## 2021-01-01 RX ADMIN — TIOTROPIUM BROMIDE INHALATION SPRAY 2 PUFF: 3.12 SPRAY, METERED RESPIRATORY (INHALATION) at 07:50

## 2021-01-01 RX ADMIN — QUETIAPINE FUMARATE 25 MG: 25 TABLET ORAL at 20:27

## 2021-01-01 RX ADMIN — CLOPIDOGREL BISULFATE 75 MG: 75 TABLET ORAL at 10:38

## 2021-01-01 RX ADMIN — LAMOTRIGINE 200 MG: 100 TABLET ORAL at 08:07

## 2021-01-01 RX ADMIN — INSULIN LISPRO 2 UNITS: 100 INJECTION, SOLUTION INTRAVENOUS; SUBCUTANEOUS at 05:52

## 2021-01-01 RX ADMIN — Medication 10 ML: at 23:05

## 2021-01-01 RX ADMIN — HYDROMORPHONE HYDROCHLORIDE 1 MG: 1 INJECTION, SOLUTION INTRAMUSCULAR; INTRAVENOUS; SUBCUTANEOUS at 22:27

## 2021-01-01 RX ADMIN — Medication 400 MG: at 08:43

## 2021-01-01 RX ADMIN — Medication 2 PUFF: at 19:22

## 2021-01-01 RX ADMIN — CLOPIDOGREL BISULFATE 75 MG: 75 TABLET ORAL at 08:08

## 2021-01-01 RX ADMIN — POTASSIUM CHLORIDE 20 MEQ: 400 INJECTION, SOLUTION INTRAVENOUS at 14:56

## 2021-01-01 RX ADMIN — TIOTROPIUM BROMIDE INHALATION SPRAY 2 PUFF: 3.12 SPRAY, METERED RESPIRATORY (INHALATION) at 08:35

## 2021-01-01 RX ADMIN — INSULIN LISPRO 6 UNITS: 100 INJECTION, SOLUTION INTRAVENOUS; SUBCUTANEOUS at 12:30

## 2021-01-01 RX ADMIN — POTASSIUM CHLORIDE 60 MEQ: 1500 TABLET, EXTENDED RELEASE ORAL at 09:55

## 2021-01-01 RX ADMIN — PANTOPRAZOLE SODIUM 40 MG: 40 INJECTION, POWDER, FOR SOLUTION INTRAVENOUS at 08:57

## 2021-01-01 RX ADMIN — LAMOTRIGINE 200 MG: 100 TABLET ORAL at 20:42

## 2021-01-01 RX ADMIN — Medication 10 ML: at 20:19

## 2021-01-01 RX ADMIN — INSULIN GLARGINE 30 UNITS: 100 INJECTION, SOLUTION SUBCUTANEOUS at 20:17

## 2021-01-01 RX ADMIN — BUSPIRONE HYDROCHLORIDE 30 MG: 10 TABLET ORAL at 20:38

## 2021-01-01 RX ADMIN — METHOCARBAMOL TABLETS 500 MG: 500 TABLET, COATED ORAL at 21:02

## 2021-01-01 RX ADMIN — INSULIN LISPRO 2 UNITS: 100 INJECTION, SOLUTION INTRAVENOUS; SUBCUTANEOUS at 08:29

## 2021-01-01 RX ADMIN — APIXABAN 5 MG: 5 TABLET, FILM COATED ORAL at 09:34

## 2021-01-01 RX ADMIN — POTASSIUM CHLORIDE 10 MEQ: 7.46 INJECTION, SOLUTION INTRAVENOUS at 05:29

## 2021-01-01 RX ADMIN — Medication 2 PUFF: at 20:46

## 2021-01-01 RX ADMIN — CEFEPIME 2000 MG: 2 INJECTION, POWDER, FOR SOLUTION INTRAVENOUS at 19:05

## 2021-01-01 RX ADMIN — BUSPIRONE HYDROCHLORIDE 30 MG: 5 TABLET ORAL at 20:26

## 2021-01-01 RX ADMIN — PANTOPRAZOLE SODIUM 40 MG: 40 TABLET, DELAYED RELEASE ORAL at 06:20

## 2021-01-01 RX ADMIN — PERFLUTREN 1.65 MG: 6.52 INJECTION, SUSPENSION INTRAVENOUS at 09:11

## 2021-01-01 RX ADMIN — TIOTROPIUM BROMIDE INHALATION SPRAY 2 PUFF: 3.12 SPRAY, METERED RESPIRATORY (INHALATION) at 07:15

## 2021-01-01 RX ADMIN — APIXABAN 5 MG: 5 TABLET, FILM COATED ORAL at 20:48

## 2021-01-01 RX ADMIN — APIXABAN 5 MG: 5 TABLET, FILM COATED ORAL at 12:31

## 2021-01-01 RX ADMIN — GABAPENTIN 300 MG: 300 CAPSULE ORAL at 14:55

## 2021-01-01 RX ADMIN — HYDRALAZINE HYDROCHLORIDE 10 MG: 10 TABLET, FILM COATED ORAL at 15:17

## 2021-01-01 RX ADMIN — GABAPENTIN 300 MG: 300 CAPSULE ORAL at 20:27

## 2021-01-01 RX ADMIN — DEXTROSE MONOHYDRATE 25 G: 25 INJECTION, SOLUTION INTRAVENOUS at 16:08

## 2021-01-01 RX ADMIN — FENTANYL CITRATE 50 MCG: 50 INJECTION INTRAMUSCULAR; INTRAVENOUS at 14:30

## 2021-01-01 RX ADMIN — METOPROLOL TARTRATE 50 MG: 50 TABLET, FILM COATED ORAL at 20:29

## 2021-01-01 RX ADMIN — INSULIN LISPRO 6 UNITS: 100 INJECTION, SOLUTION INTRAVENOUS; SUBCUTANEOUS at 17:11

## 2021-01-01 RX ADMIN — INSULIN LISPRO 1 UNITS: 100 INJECTION, SOLUTION INTRAVENOUS; SUBCUTANEOUS at 05:54

## 2021-01-01 RX ADMIN — TOLVAPTAN 15 MG: 15 TABLET ORAL at 17:21

## 2021-01-01 RX ADMIN — APIXABAN 5 MG: 5 TABLET, FILM COATED ORAL at 19:52

## 2021-01-01 RX ADMIN — HEPARIN SODIUM 3800 UNITS: 1000 INJECTION INTRAVENOUS; SUBCUTANEOUS at 14:49

## 2021-01-01 RX ADMIN — FUROSEMIDE 20 MG/HR: 10 INJECTION, SOLUTION INTRAMUSCULAR; INTRAVENOUS at 00:21

## 2021-01-01 RX ADMIN — BUPRENORPHINE AND NALOXONE 1 FILM: 8; 2 FILM BUCCAL; SUBLINGUAL at 20:07

## 2021-01-01 RX ADMIN — BUSPIRONE HYDROCHLORIDE 30 MG: 10 TABLET ORAL at 22:48

## 2021-01-01 RX ADMIN — SODIUM CHLORIDE 1000 ML: 9 INJECTION, SOLUTION INTRAVENOUS at 14:50

## 2021-01-01 RX ADMIN — MICONAZOLE NITRATE: 20 POWDER TOPICAL at 08:32

## 2021-01-01 RX ADMIN — ARIPIPRAZOLE 15 MG: 10 TABLET ORAL at 08:39

## 2021-01-01 RX ADMIN — Medication 10 ML: at 07:49

## 2021-01-01 RX ADMIN — Medication 10 ML: at 09:32

## 2021-01-01 RX ADMIN — DOCUSATE SODIUM 100 MG: 100 CAPSULE ORAL at 12:24

## 2021-01-01 RX ADMIN — PANTOPRAZOLE SODIUM 40 MG: 40 TABLET, DELAYED RELEASE ORAL at 16:13

## 2021-01-01 RX ADMIN — Medication 2 PUFF: at 08:34

## 2021-01-01 RX ADMIN — ASPIRIN 81 MG: 81 TABLET, FILM COATED ORAL at 08:57

## 2021-01-01 RX ADMIN — ATORVASTATIN CALCIUM 80 MG: 80 TABLET, FILM COATED ORAL at 20:57

## 2021-01-01 RX ADMIN — BUSPIRONE HYDROCHLORIDE 30 MG: 10 TABLET ORAL at 20:30

## 2021-01-01 RX ADMIN — SODIUM BICARBONATE 650 MG: 650 TABLET ORAL at 09:26

## 2021-01-01 RX ADMIN — FUROSEMIDE 60 MG: 10 INJECTION, SOLUTION INTRAMUSCULAR; INTRAVENOUS at 12:07

## 2021-01-01 RX ADMIN — INSULIN LISPRO 2 UNITS: 100 INJECTION, SOLUTION INTRAVENOUS; SUBCUTANEOUS at 13:04

## 2021-01-01 RX ADMIN — DEXTROSE MONOHYDRATE 25 G: 25 INJECTION, SOLUTION INTRAVENOUS at 15:30

## 2021-01-01 RX ADMIN — DOXYCYCLINE HYCLATE 100 MG: 100 TABLET, COATED ORAL at 11:50

## 2021-01-01 RX ADMIN — INSULIN LISPRO 10 UNITS: 100 INJECTION, SOLUTION INTRAVENOUS; SUBCUTANEOUS at 16:15

## 2021-01-01 RX ADMIN — Medication 2 PUFF: at 07:50

## 2021-01-01 RX ADMIN — Medication 2 PUFF: at 20:05

## 2021-01-01 RX ADMIN — CLOPIDOGREL BISULFATE 75 MG: 75 TABLET ORAL at 09:07

## 2021-01-01 RX ADMIN — ENOXAPARIN SODIUM 30 MG: 30 INJECTION, SOLUTION INTRAVENOUS; SUBCUTANEOUS at 08:03

## 2021-01-01 RX ADMIN — BENZTROPINE MESYLATE 1 MG: 1 TABLET ORAL at 20:15

## 2021-01-01 RX ADMIN — POTASSIUM CHLORIDE 10 MEQ: 7.46 INJECTION, SOLUTION INTRAVENOUS at 12:17

## 2021-01-01 RX ADMIN — CLOPIDOGREL BISULFATE 75 MG: 75 TABLET ORAL at 09:49

## 2021-01-01 RX ADMIN — HEPARIN SODIUM 5000 UNITS: 5000 INJECTION INTRAVENOUS; SUBCUTANEOUS at 05:39

## 2021-01-01 RX ADMIN — Medication 2 PUFF: at 20:39

## 2021-01-01 RX ADMIN — LAMOTRIGINE 200 MG: 100 TABLET ORAL at 20:49

## 2021-01-01 RX ADMIN — LAMOTRIGINE 200 MG: 100 TABLET ORAL at 20:48

## 2021-01-01 RX ADMIN — LAMOTRIGINE 200 MG: 100 TABLET ORAL at 07:52

## 2021-01-01 RX ADMIN — INSULIN LISPRO 2 UNITS: 100 INJECTION, SOLUTION INTRAVENOUS; SUBCUTANEOUS at 17:28

## 2021-01-01 RX ADMIN — APIXABAN 5 MG: 5 TABLET, FILM COATED ORAL at 12:08

## 2021-01-01 RX ADMIN — LAMOTRIGINE 200 MG: 100 TABLET ORAL at 22:42

## 2021-01-01 RX ADMIN — ATORVASTATIN CALCIUM 80 MG: 40 TABLET, FILM COATED ORAL at 12:19

## 2021-01-01 RX ADMIN — BUSPIRONE HYDROCHLORIDE 30 MG: 10 TABLET ORAL at 20:08

## 2021-01-01 RX ADMIN — FUROSEMIDE 20 MG/HR: 10 INJECTION, SOLUTION INTRAMUSCULAR; INTRAVENOUS at 00:23

## 2021-01-01 RX ADMIN — ARIPIPRAZOLE 15 MG: 10 TABLET ORAL at 08:08

## 2021-01-01 RX ADMIN — BENZTROPINE MESYLATE 1 MG: 1 TABLET ORAL at 22:20

## 2021-01-01 RX ADMIN — INSULIN LISPRO 2 UNITS: 100 INJECTION, SOLUTION INTRAVENOUS; SUBCUTANEOUS at 12:24

## 2021-01-01 RX ADMIN — METOPROLOL TARTRATE 50 MG: 50 TABLET, FILM COATED ORAL at 10:28

## 2021-01-01 RX ADMIN — Medication 10 ML: at 10:23

## 2021-01-01 RX ADMIN — METOPROLOL TARTRATE 25 MG: 25 TABLET, FILM COATED ORAL at 20:27

## 2021-01-01 RX ADMIN — SODIUM ZIRCONIUM CYCLOSILICATE 10 G: 10 POWDER, FOR SUSPENSION ORAL at 20:43

## 2021-01-01 RX ADMIN — GABAPENTIN 300 MG: 300 CAPSULE ORAL at 08:36

## 2021-01-01 RX ADMIN — POTASSIUM CHLORIDE 10 MEQ: 7.46 INJECTION, SOLUTION INTRAVENOUS at 16:54

## 2021-01-01 RX ADMIN — INSULIN LISPRO 1 UNITS: 100 INJECTION, SOLUTION INTRAVENOUS; SUBCUTANEOUS at 00:49

## 2021-01-01 RX ADMIN — POTASSIUM CHLORIDE AND SODIUM CHLORIDE: 900; 150 INJECTION, SOLUTION INTRAVENOUS at 12:31

## 2021-01-01 RX ADMIN — SODIUM CHLORIDE: 234 INJECTION, SOLUTION INTRAVENOUS at 14:33

## 2021-01-01 RX ADMIN — Medication 2 PUFF: at 19:41

## 2021-01-01 RX ADMIN — PANTOPRAZOLE SODIUM 40 MG: 40 INJECTION, POWDER, FOR SOLUTION INTRAVENOUS at 08:44

## 2021-01-01 RX ADMIN — SPIRONOLACTONE 25 MG: 25 TABLET, FILM COATED ORAL at 10:41

## 2021-01-01 RX ADMIN — INSULIN GLARGINE 30 UNITS: 100 INJECTION, SOLUTION SUBCUTANEOUS at 20:45

## 2021-01-01 RX ADMIN — APIXABAN 5 MG: 5 TABLET, FILM COATED ORAL at 08:58

## 2021-01-01 RX ADMIN — METOPROLOL TARTRATE 12.5 MG: 25 TABLET, FILM COATED ORAL at 21:08

## 2021-01-01 RX ADMIN — POTASSIUM CHLORIDE 20 MEQ: 400 INJECTION, SOLUTION INTRAVENOUS at 15:58

## 2021-01-01 RX ADMIN — ARFORMOTEROL TARTRATE 15 MCG: 15 SOLUTION RESPIRATORY (INHALATION) at 21:00

## 2021-01-01 RX ADMIN — MORPHINE SULFATE 2 MG: 2 INJECTION, SOLUTION INTRAMUSCULAR; INTRAVENOUS at 18:03

## 2021-01-01 RX ADMIN — MUPIROCIN: 20 OINTMENT TOPICAL at 09:36

## 2021-01-01 RX ADMIN — VANCOMYCIN HYDROCHLORIDE 1500 MG: 1 INJECTION, POWDER, LYOPHILIZED, FOR SOLUTION INTRAVENOUS at 13:47

## 2021-01-01 RX ADMIN — ALBUMIN (HUMAN) 12.5 G: 0.25 INJECTION, SOLUTION INTRAVENOUS at 22:11

## 2021-01-01 RX ADMIN — SODIUM CHLORIDE, PRESERVATIVE FREE 10 ML: 5 INJECTION INTRAVENOUS at 20:18

## 2021-01-01 RX ADMIN — QUETIAPINE FUMARATE 25 MG: 25 TABLET ORAL at 20:24

## 2021-01-01 RX ADMIN — INSULIN LISPRO 6 UNITS: 100 INJECTION, SOLUTION INTRAVENOUS; SUBCUTANEOUS at 17:32

## 2021-01-01 RX ADMIN — POTASSIUM CHLORIDE 40 MEQ: 1500 TABLET, EXTENDED RELEASE ORAL at 09:42

## 2021-01-01 RX ADMIN — Medication 6 MG: at 23:38

## 2021-01-01 RX ADMIN — METHOCARBAMOL TABLETS 500 MG: 500 TABLET, COATED ORAL at 20:56

## 2021-01-01 RX ADMIN — ASPIRIN 81 MG: 81 TABLET, COATED ORAL at 09:41

## 2021-01-01 RX ADMIN — HYDRALAZINE HYDROCHLORIDE 10 MG: 10 TABLET, FILM COATED ORAL at 14:11

## 2021-01-01 RX ADMIN — PANTOPRAZOLE SODIUM 40 MG: 40 TABLET, DELAYED RELEASE ORAL at 18:13

## 2021-01-01 RX ADMIN — Medication 2000 MG: at 23:51

## 2021-01-01 RX ADMIN — INSULIN LISPRO 2 UNITS: 100 INJECTION, SOLUTION INTRAVENOUS; SUBCUTANEOUS at 20:55

## 2021-01-01 RX ADMIN — LAMOTRIGINE 200 MG: 100 TABLET ORAL at 10:21

## 2021-01-01 RX ADMIN — POTASSIUM CHLORIDE 10 MEQ: 7.46 INJECTION, SOLUTION INTRAVENOUS at 22:15

## 2021-01-01 RX ADMIN — TIOTROPIUM BROMIDE INHALATION SPRAY 2 PUFF: 3.12 SPRAY, METERED RESPIRATORY (INHALATION) at 06:54

## 2021-01-01 RX ADMIN — PANTOPRAZOLE SODIUM 40 MG: 40 TABLET, DELAYED RELEASE ORAL at 16:36

## 2021-01-01 RX ADMIN — CEFEPIME HYDROCHLORIDE 1000 MG: 1 INJECTION, POWDER, FOR SOLUTION INTRAMUSCULAR; INTRAVENOUS at 21:51

## 2021-01-01 RX ADMIN — TORSEMIDE 100 MG: 100 TABLET ORAL at 09:32

## 2021-01-01 RX ADMIN — METOPROLOL TARTRATE 50 MG: 50 TABLET, FILM COATED ORAL at 20:42

## 2021-01-01 RX ADMIN — FUROSEMIDE 20 MG/HR: 10 INJECTION INTRAMUSCULAR; INTRAVENOUS at 18:27

## 2021-01-01 RX ADMIN — BUDESONIDE 500 MCG: 0.5 SUSPENSION RESPIRATORY (INHALATION) at 10:26

## 2021-01-01 RX ADMIN — PANTOPRAZOLE SODIUM 40 MG: 40 TABLET, DELAYED RELEASE ORAL at 08:40

## 2021-01-01 RX ADMIN — METOLAZONE 5 MG: 2.5 TABLET ORAL at 08:42

## 2021-01-01 RX ADMIN — LAMOTRIGINE 200 MG: 100 TABLET ORAL at 20:18

## 2021-01-01 RX ADMIN — INSULIN LISPRO 4 UNITS: 100 INJECTION, SOLUTION INTRAVENOUS; SUBCUTANEOUS at 12:32

## 2021-01-01 RX ADMIN — INSULIN LISPRO 1 UNITS: 100 INJECTION, SOLUTION INTRAVENOUS; SUBCUTANEOUS at 09:57

## 2021-01-01 RX ADMIN — BUSPIRONE HYDROCHLORIDE 30 MG: 10 TABLET ORAL at 08:07

## 2021-01-01 RX ADMIN — CARVEDILOL 3.12 MG: 3.12 TABLET, FILM COATED ORAL at 15:39

## 2021-01-01 RX ADMIN — INSULIN LISPRO 3 UNITS: 100 INJECTION, SOLUTION INTRAVENOUS; SUBCUTANEOUS at 21:18

## 2021-01-01 RX ADMIN — POTASSIUM CHLORIDE 10 MEQ: 7.46 INJECTION, SOLUTION INTRAVENOUS at 15:09

## 2021-01-01 RX ADMIN — POTASSIUM CHLORIDE 10 MEQ: 7.46 INJECTION, SOLUTION INTRAVENOUS at 16:15

## 2021-01-01 RX ADMIN — Medication 2 PUFF: at 19:20

## 2021-01-01 RX ADMIN — BUSPIRONE HYDROCHLORIDE 30 MG: 10 TABLET ORAL at 21:38

## 2021-01-01 RX ADMIN — APIXABAN 5 MG: 5 TABLET, FILM COATED ORAL at 09:33

## 2021-01-01 RX ADMIN — FUROSEMIDE 20 MG/HR: 10 INJECTION, SOLUTION INTRAMUSCULAR; INTRAVENOUS at 05:43

## 2021-01-01 RX ADMIN — INSULIN LISPRO 3 UNITS: 100 INJECTION, SOLUTION INTRAVENOUS; SUBCUTANEOUS at 17:31

## 2021-01-01 RX ADMIN — OXYCODONE 5 MG: 5 TABLET ORAL at 08:40

## 2021-01-01 RX ADMIN — TIOTROPIUM BROMIDE INHALATION SPRAY 2 PUFF: 3.12 SPRAY, METERED RESPIRATORY (INHALATION) at 09:00

## 2021-01-01 RX ADMIN — LAMOTRIGINE 200 MG: 100 TABLET ORAL at 09:52

## 2021-01-01 RX ADMIN — Medication 400 MG: at 08:36

## 2021-01-01 RX ADMIN — POTASSIUM CHLORIDE 10 MEQ: 7.46 INJECTION, SOLUTION INTRAVENOUS at 00:09

## 2021-01-01 RX ADMIN — INSULIN GLARGINE 18 UNITS: 100 INJECTION, SOLUTION SUBCUTANEOUS at 19:57

## 2021-01-01 RX ADMIN — BENZTROPINE MESYLATE 1 MG: 1 TABLET ORAL at 21:55

## 2021-01-01 RX ADMIN — BUSPIRONE HYDROCHLORIDE 30 MG: 10 TABLET ORAL at 09:52

## 2021-01-01 RX ADMIN — LAMOTRIGINE 200 MG: 100 TABLET ORAL at 20:50

## 2021-01-01 RX ADMIN — POTASSIUM CHLORIDE 10 MEQ: 7.46 INJECTION, SOLUTION INTRAVENOUS at 04:35

## 2021-01-01 RX ADMIN — ZOLPIDEM TARTRATE 5 MG: 5 TABLET ORAL at 22:27

## 2021-01-01 RX ADMIN — POTASSIUM CHLORIDE 10 MEQ: 7.46 INJECTION, SOLUTION INTRAVENOUS at 14:50

## 2021-01-01 RX ADMIN — Medication 2 PUFF: at 09:14

## 2021-01-01 RX ADMIN — PANTOPRAZOLE SODIUM 40 MG: 40 TABLET, DELAYED RELEASE ORAL at 08:59

## 2021-01-01 RX ADMIN — QUETIAPINE FUMARATE 25 MG: 25 TABLET ORAL at 20:47

## 2021-01-01 RX ADMIN — METOPROLOL TARTRATE 25 MG: 25 TABLET, FILM COATED ORAL at 20:24

## 2021-01-01 RX ADMIN — ARFORMOTEROL TARTRATE 15 MCG: 15 SOLUTION RESPIRATORY (INHALATION) at 09:10

## 2021-01-01 RX ADMIN — CLOPIDOGREL BISULFATE 75 MG: 75 TABLET ORAL at 12:19

## 2021-01-01 RX ADMIN — METOPROLOL TARTRATE 25 MG: 25 TABLET, FILM COATED ORAL at 09:42

## 2021-01-01 RX ADMIN — ALBUMIN (HUMAN) 25 G: 0.25 INJECTION, SOLUTION INTRAVENOUS at 17:36

## 2021-01-01 RX ADMIN — ATORVASTATIN CALCIUM 80 MG: 80 TABLET, FILM COATED ORAL at 20:39

## 2021-01-01 RX ADMIN — OXYCODONE HYDROCHLORIDE AND ACETAMINOPHEN 1 TABLET: 5; 325 TABLET ORAL at 12:28

## 2021-01-01 RX ADMIN — INSULIN LISPRO 6 UNITS: 100 INJECTION, SOLUTION INTRAVENOUS; SUBCUTANEOUS at 09:09

## 2021-01-01 RX ADMIN — MIDODRINE HYDROCHLORIDE 10 MG: 10 TABLET ORAL at 16:27

## 2021-01-01 RX ADMIN — PANTOPRAZOLE SODIUM 40 MG: 40 TABLET, DELAYED RELEASE ORAL at 05:43

## 2021-01-01 RX ADMIN — ASPIRIN 81 MG: 81 TABLET, FILM COATED ORAL at 08:08

## 2021-01-01 RX ADMIN — QUETIAPINE FUMARATE 25 MG: 25 TABLET ORAL at 20:50

## 2021-01-01 RX ADMIN — ASPIRIN 81 MG: 81 TABLET, COATED ORAL at 08:36

## 2021-01-01 RX ADMIN — INSULIN LISPRO 8 UNITS: 100 INJECTION, SOLUTION INTRAVENOUS; SUBCUTANEOUS at 16:32

## 2021-01-01 RX ADMIN — FUROSEMIDE 80 MG: 10 INJECTION, SOLUTION INTRAMUSCULAR; INTRAVENOUS at 18:31

## 2021-01-01 RX ADMIN — ATORVASTATIN CALCIUM 20 MG: 10 TABLET, FILM COATED ORAL at 21:03

## 2021-01-01 RX ADMIN — INSULIN LISPRO 1 UNITS: 100 INJECTION, SOLUTION INTRAVENOUS; SUBCUTANEOUS at 21:54

## 2021-01-01 RX ADMIN — LAMOTRIGINE 200 MG: 100 TABLET ORAL at 10:38

## 2021-01-01 RX ADMIN — MIDODRINE HYDROCHLORIDE 10 MG: 10 TABLET ORAL at 21:08

## 2021-01-01 RX ADMIN — BUSPIRONE HYDROCHLORIDE 30 MG: 10 TABLET ORAL at 20:56

## 2021-01-01 RX ADMIN — METOPROLOL TARTRATE 50 MG: 50 TABLET, FILM COATED ORAL at 09:39

## 2021-01-01 RX ADMIN — SODIUM CHLORIDE 250 ML: 9 INJECTION, SOLUTION INTRAVENOUS at 04:07

## 2021-01-01 RX ADMIN — APIXABAN 5 MG: 5 TABLET, FILM COATED ORAL at 20:57

## 2021-01-01 RX ADMIN — DOCUSATE SODIUM 100 MG: 100 CAPSULE ORAL at 21:28

## 2021-01-01 RX ADMIN — Medication 400 MG: at 07:51

## 2021-01-01 RX ADMIN — ENOXAPARIN SODIUM 30 MG: 30 INJECTION, SOLUTION INTRAVENOUS; SUBCUTANEOUS at 08:58

## 2021-01-01 RX ADMIN — POTASSIUM CHLORIDE 10 MEQ: 7.46 INJECTION, SOLUTION INTRAVENOUS at 14:03

## 2021-01-01 RX ADMIN — LAMOTRIGINE 200 MG: 100 TABLET ORAL at 20:33

## 2021-01-01 RX ADMIN — ALBUMIN (HUMAN) 12.5 G: 0.25 INJECTION, SOLUTION INTRAVENOUS at 12:36

## 2021-01-01 RX ADMIN — PANTOPRAZOLE SODIUM 40 MG: 40 TABLET, DELAYED RELEASE ORAL at 15:42

## 2021-01-01 RX ADMIN — PANTOPRAZOLE SODIUM 40 MG: 40 TABLET, DELAYED RELEASE ORAL at 09:58

## 2021-01-01 RX ADMIN — METHOCARBAMOL TABLETS 500 MG: 500 TABLET, COATED ORAL at 20:39

## 2021-01-01 RX ADMIN — FUROSEMIDE 20 MG/HR: 10 INJECTION INTRAMUSCULAR; INTRAVENOUS at 07:03

## 2021-01-01 RX ADMIN — MICONAZOLE NITRATE: 2 POWDER TOPICAL at 07:58

## 2021-01-01 RX ADMIN — METHOCARBAMOL TABLETS 500 MG: 500 TABLET, COATED ORAL at 21:55

## 2021-01-01 RX ADMIN — METHOCARBAMOL TABLETS 500 MG: 500 TABLET, COATED ORAL at 09:53

## 2021-01-01 RX ADMIN — PANTOPRAZOLE SODIUM 40 MG: 40 TABLET, DELAYED RELEASE ORAL at 06:07

## 2021-01-01 RX ADMIN — CLOPIDOGREL BISULFATE 75 MG: 75 TABLET ORAL at 08:58

## 2021-01-01 RX ADMIN — BENZTROPINE MESYLATE 1 MG: 1 TABLET ORAL at 20:42

## 2021-01-01 RX ADMIN — PANTOPRAZOLE SODIUM 40 MG: 40 TABLET, DELAYED RELEASE ORAL at 09:42

## 2021-01-01 RX ADMIN — ATORVASTATIN CALCIUM 80 MG: 80 TABLET, FILM COATED ORAL at 20:25

## 2021-01-01 RX ADMIN — BUDESONIDE 500 MCG: 0.5 SUSPENSION RESPIRATORY (INHALATION) at 21:45

## 2021-01-01 RX ADMIN — Medication 2 PUFF: at 17:32

## 2021-01-01 RX ADMIN — SODIUM CHLORIDE, PRESERVATIVE FREE 10 ML: 5 INJECTION INTRAVENOUS at 08:17

## 2021-01-01 RX ADMIN — Medication 2 PUFF: at 06:55

## 2021-01-01 RX ADMIN — LAMOTRIGINE 200 MG: 100 TABLET ORAL at 08:15

## 2021-01-01 RX ADMIN — CEFEPIME 2000 MG: 2 INJECTION, POWDER, FOR SOLUTION INTRAVENOUS at 21:59

## 2021-01-01 RX ADMIN — INSULIN LISPRO 6 UNITS: 100 INJECTION, SOLUTION INTRAVENOUS; SUBCUTANEOUS at 17:55

## 2021-01-01 RX ADMIN — MICONAZOLE NITRATE: 20 POWDER TOPICAL at 08:53

## 2021-01-01 RX ADMIN — HYDRALAZINE HYDROCHLORIDE 10 MG: 10 TABLET, FILM COATED ORAL at 21:40

## 2021-01-01 RX ADMIN — INSULIN LISPRO 1 UNITS: 100 INJECTION, SOLUTION INTRAVENOUS; SUBCUTANEOUS at 17:56

## 2021-01-01 RX ADMIN — SPIRONOLACTONE 50 MG: 50 TABLET ORAL at 08:58

## 2021-01-01 RX ADMIN — CARVEDILOL 3.12 MG: 3.12 TABLET, FILM COATED ORAL at 08:08

## 2021-01-01 RX ADMIN — MIDODRINE HYDROCHLORIDE 10 MG: 10 TABLET ORAL at 08:05

## 2021-01-01 RX ADMIN — LAMOTRIGINE 200 MG: 100 TABLET ORAL at 21:56

## 2021-01-01 RX ADMIN — POTASSIUM CHLORIDE 10 MEQ: 7.46 INJECTION, SOLUTION INTRAVENOUS at 06:04

## 2021-01-01 RX ADMIN — LAMOTRIGINE 200 MG: 100 TABLET ORAL at 20:38

## 2021-01-01 RX ADMIN — ARIPIPRAZOLE 15 MG: 5 TABLET ORAL at 09:52

## 2021-01-01 RX ADMIN — MIDODRINE HYDROCHLORIDE 10 MG: 10 TABLET ORAL at 12:09

## 2021-01-01 RX ADMIN — PANTOPRAZOLE SODIUM 40 MG: 40 TABLET, DELAYED RELEASE ORAL at 06:37

## 2021-01-01 RX ADMIN — GABAPENTIN 300 MG: 300 CAPSULE ORAL at 13:04

## 2021-01-01 RX ADMIN — ALBUMIN (HUMAN) 12.5 G: 0.25 INJECTION, SOLUTION INTRAVENOUS at 22:15

## 2021-01-01 RX ADMIN — MUPIROCIN: 20 OINTMENT TOPICAL at 21:52

## 2021-01-01 RX ADMIN — BUSPIRONE HYDROCHLORIDE 30 MG: 10 TABLET ORAL at 08:02

## 2021-01-01 RX ADMIN — Medication 2 PUFF: at 19:55

## 2021-01-01 RX ADMIN — ASPIRIN 81 MG: 81 TABLET, FILM COATED ORAL at 08:50

## 2021-01-01 RX ADMIN — METOPROLOL TARTRATE 50 MG: 50 TABLET, FILM COATED ORAL at 08:35

## 2021-01-01 RX ADMIN — BUDESONIDE 500 MCG: 0.5 SUSPENSION RESPIRATORY (INHALATION) at 09:16

## 2021-01-01 RX ADMIN — Medication 10 ML: at 09:45

## 2021-01-01 RX ADMIN — INSULIN LISPRO 4 UNITS: 100 INJECTION, SOLUTION INTRAVENOUS; SUBCUTANEOUS at 09:28

## 2021-01-01 RX ADMIN — Medication 2 PUFF: at 19:25

## 2021-01-01 RX ADMIN — FUROSEMIDE 20 MG/HR: 10 INJECTION, SOLUTION INTRAMUSCULAR; INTRAVENOUS at 21:32

## 2021-01-01 RX ADMIN — ALBUMIN (HUMAN) 25 G: 0.25 INJECTION, SOLUTION INTRAVENOUS at 08:49

## 2021-01-01 RX ADMIN — MICONAZOLE NITRATE: 20 POWDER TOPICAL at 09:57

## 2021-01-01 RX ADMIN — HEPARIN SODIUM 5000 UNITS: 5000 INJECTION INTRAVENOUS; SUBCUTANEOUS at 21:25

## 2021-01-01 RX ADMIN — KETOROLAC TROMETHAMINE 15 MG: 30 INJECTION, SOLUTION INTRAMUSCULAR; INTRAVENOUS at 13:30

## 2021-01-01 RX ADMIN — ATORVASTATIN CALCIUM 80 MG: 40 TABLET, FILM COATED ORAL at 20:39

## 2021-01-01 RX ADMIN — APIXABAN 5 MG: 5 TABLET, FILM COATED ORAL at 20:42

## 2021-01-01 RX ADMIN — ARFORMOTEROL TARTRATE 15 MCG: 15 SOLUTION RESPIRATORY (INHALATION) at 09:15

## 2021-01-01 RX ADMIN — Medication 15 MG: at 10:23

## 2021-01-01 RX ADMIN — INSULIN LISPRO 1 UNITS: 100 INJECTION, SOLUTION INTRAVENOUS; SUBCUTANEOUS at 12:09

## 2021-01-01 RX ADMIN — INSULIN LISPRO 1 UNITS: 100 INJECTION, SOLUTION INTRAVENOUS; SUBCUTANEOUS at 12:58

## 2021-01-01 RX ADMIN — POLYETHYLENE GLYCOL (3350) 17 G: 17 POWDER, FOR SOLUTION ORAL at 21:09

## 2021-01-01 RX ADMIN — BUDESONIDE 500 MCG: 0.5 SUSPENSION RESPIRATORY (INHALATION) at 10:15

## 2021-01-01 RX ADMIN — Medication 2 PUFF: at 18:39

## 2021-01-01 RX ADMIN — ALBUMIN (HUMAN) 25 G: 0.25 INJECTION, SOLUTION INTRAVENOUS at 10:14

## 2021-01-01 RX ADMIN — CARVEDILOL 3.12 MG: 3.12 TABLET, FILM COATED ORAL at 20:07

## 2021-01-01 RX ADMIN — OXYCODONE HYDROCHLORIDE AND ACETAMINOPHEN 1 TABLET: 5; 325 TABLET ORAL at 11:41

## 2021-01-01 RX ADMIN — Medication 2000 MG: at 06:57

## 2021-01-01 RX ADMIN — POTASSIUM BICARBONATE 40 MEQ: 782 TABLET, EFFERVESCENT ORAL at 14:29

## 2021-01-01 RX ADMIN — Medication 10 ML: at 20:42

## 2021-01-01 RX ADMIN — AMIODARONE HYDROCHLORIDE 200 MG: 200 TABLET ORAL at 15:39

## 2021-01-01 RX ADMIN — HYDRALAZINE HYDROCHLORIDE 10 MG: 10 TABLET, FILM COATED ORAL at 22:02

## 2021-01-01 RX ADMIN — BUSPIRONE HYDROCHLORIDE 30 MG: 10 TABLET ORAL at 10:52

## 2021-01-01 RX ADMIN — MICONAZOLE NITRATE: 20 POWDER TOPICAL at 20:20

## 2021-01-01 RX ADMIN — Medication 6 MG: at 21:31

## 2021-01-01 RX ADMIN — DOCUSATE SODIUM 100 MG: 100 CAPSULE ORAL at 12:32

## 2021-01-01 RX ADMIN — BENZTROPINE MESYLATE 1 MG: 1 TABLET ORAL at 20:30

## 2021-01-01 RX ADMIN — BENZTROPINE MESYLATE 1 MG: 1 TABLET ORAL at 22:02

## 2021-01-01 RX ADMIN — BENZTROPINE MESYLATE 1 MG: 1 TABLET ORAL at 20:34

## 2021-01-01 RX ADMIN — MICONAZOLE NITRATE: 20 POWDER TOPICAL at 20:49

## 2021-01-01 RX ADMIN — MIDODRINE HYDROCHLORIDE 10 MG: 10 TABLET ORAL at 17:50

## 2021-01-01 RX ADMIN — Medication 2 PUFF: at 07:02

## 2021-01-01 RX ADMIN — Medication 10 ML: at 09:08

## 2021-01-01 RX ADMIN — POTASSIUM CHLORIDE 10 MEQ: 7.46 INJECTION, SOLUTION INTRAVENOUS at 20:04

## 2021-01-01 RX ADMIN — OXYCODONE HYDROCHLORIDE AND ACETAMINOPHEN 1 TABLET: 5; 325 TABLET ORAL at 14:49

## 2021-01-01 RX ADMIN — LAMOTRIGINE 200 MG: 100 TABLET ORAL at 09:31

## 2021-01-01 RX ADMIN — BUSPIRONE HYDROCHLORIDE 30 MG: 5 TABLET ORAL at 20:17

## 2021-01-01 RX ADMIN — METOPROLOL TARTRATE 50 MG: 50 TABLET, FILM COATED ORAL at 09:33

## 2021-01-01 RX ADMIN — SODIUM CHLORIDE 25 ML: 9 INJECTION, SOLUTION INTRAVENOUS at 14:12

## 2021-01-01 RX ADMIN — PANTOPRAZOLE SODIUM 40 MG: 40 TABLET, DELAYED RELEASE ORAL at 16:29

## 2021-01-01 RX ADMIN — INSULIN GLARGINE 18 UNITS: 100 INJECTION, SOLUTION SUBCUTANEOUS at 20:55

## 2021-01-01 RX ADMIN — INSULIN LISPRO 6 UNITS: 100 INJECTION, SOLUTION INTRAVENOUS; SUBCUTANEOUS at 09:35

## 2021-01-01 RX ADMIN — SODIUM CHLORIDE 1000 ML: 9 INJECTION, SOLUTION INTRAVENOUS at 18:56

## 2021-01-01 RX ADMIN — METOPROLOL TARTRATE 12.5 MG: 25 TABLET, FILM COATED ORAL at 08:55

## 2021-01-01 RX ADMIN — FUROSEMIDE 5 MG/HR: 10 INJECTION INTRAMUSCULAR; INTRAVENOUS at 15:41

## 2021-01-01 RX ADMIN — INSULIN LISPRO 6 UNITS: 100 INJECTION, SOLUTION INTRAVENOUS; SUBCUTANEOUS at 17:16

## 2021-01-01 RX ADMIN — MORPHINE SULFATE 2 MG: 2 INJECTION, SOLUTION INTRAMUSCULAR; INTRAVENOUS at 16:39

## 2021-01-01 RX ADMIN — BUSPIRONE HYDROCHLORIDE 30 MG: 5 TABLET ORAL at 09:41

## 2021-01-01 RX ADMIN — DOXEPIN HYDROCHLORIDE 25 MG: 25 CAPSULE ORAL at 09:42

## 2021-01-01 RX ADMIN — ZOLPIDEM TARTRATE 5 MG: 5 TABLET ORAL at 21:14

## 2021-01-01 RX ADMIN — POTASSIUM CHLORIDE 40 MEQ: 1500 TABLET, EXTENDED RELEASE ORAL at 12:00

## 2021-01-01 RX ADMIN — ACETAMINOPHEN 650 MG: 325 TABLET ORAL at 23:03

## 2021-01-01 RX ADMIN — MICONAZOLE NITRATE: 20 POWDER TOPICAL at 08:29

## 2021-01-01 RX ADMIN — APIXABAN 5 MG: 5 TABLET, FILM COATED ORAL at 09:18

## 2021-01-01 RX ADMIN — ASCORBIC ACID, THIAMINE MONONITRATE,RIBOFLAVIN, NIACINAMIDE, PYRIDOXINE HYDROCHLORIDE, FOLIC ACID, CYANOCOBALAMIN, BIOTIN, CALCIUM PANTOTHENATE, 1 MG: 100; 1.5; 1.7; 20; 10; 1; 6000; 150000; 5 CAPSULE, LIQUID FILLED ORAL at 09:07

## 2021-01-01 RX ADMIN — POTASSIUM CHLORIDE 10 MEQ: 7.46 INJECTION, SOLUTION INTRAVENOUS at 09:36

## 2021-01-01 RX ADMIN — HEPARIN SODIUM 5000 UNITS: 5000 INJECTION INTRAVENOUS; SUBCUTANEOUS at 06:16

## 2021-01-01 RX ADMIN — PANTOPRAZOLE SODIUM 40 MG: 40 TABLET, DELAYED RELEASE ORAL at 18:25

## 2021-01-01 RX ADMIN — METOPROLOL TARTRATE 50 MG: 50 TABLET, FILM COATED ORAL at 20:57

## 2021-01-01 RX ADMIN — Medication 400 MG: at 08:56

## 2021-01-01 RX ADMIN — HYDRALAZINE HYDROCHLORIDE 10 MG: 10 TABLET, FILM COATED ORAL at 05:27

## 2021-01-01 RX ADMIN — Medication 2 PUFF: at 14:08

## 2021-01-01 RX ADMIN — BUPRENORPHINE AND NALOXONE 1 FILM: 8; 2 FILM BUCCAL; SUBLINGUAL at 08:58

## 2021-01-01 RX ADMIN — SODIUM CHLORIDE, PRESERVATIVE FREE 10 ML: 5 INJECTION INTRAVENOUS at 21:08

## 2021-01-01 RX ADMIN — POTASSIUM CHLORIDE 10 MEQ: 7.46 INJECTION, SOLUTION INTRAVENOUS at 19:52

## 2021-01-01 RX ADMIN — LAMOTRIGINE 200 MG: 100 TABLET ORAL at 21:16

## 2021-01-01 RX ADMIN — ATORVASTATIN CALCIUM 20 MG: 10 TABLET, FILM COATED ORAL at 21:11

## 2021-01-01 RX ADMIN — DOXEPIN HYDROCHLORIDE 25 MG: 25 CAPSULE ORAL at 01:51

## 2021-01-01 RX ADMIN — SODIUM ZIRCONIUM CYCLOSILICATE 10 G: 10 POWDER, FOR SUSPENSION ORAL at 01:04

## 2021-01-01 RX ADMIN — POTASSIUM CHLORIDE 20 MEQ: 400 INJECTION, SOLUTION INTRAVENOUS at 23:40

## 2021-01-01 RX ADMIN — HYDROCODONE BITARTRATE AND ACETAMINOPHEN 1 TABLET: 5; 325 TABLET ORAL at 16:46

## 2021-01-01 RX ADMIN — POTASSIUM CHLORIDE 40 MEQ: 1500 TABLET, EXTENDED RELEASE ORAL at 06:03

## 2021-01-01 RX ADMIN — Medication 10 ML: at 21:52

## 2021-01-01 RX ADMIN — APIXABAN 5 MG: 5 TABLET, FILM COATED ORAL at 10:40

## 2021-01-01 RX ADMIN — APIXABAN 5 MG: 5 TABLET, FILM COATED ORAL at 20:28

## 2021-01-01 RX ADMIN — BUSPIRONE HYDROCHLORIDE 30 MG: 5 TABLET ORAL at 07:50

## 2021-01-01 RX ADMIN — PANTOPRAZOLE SODIUM 40 MG: 40 TABLET, DELAYED RELEASE ORAL at 06:33

## 2021-01-01 RX ADMIN — GABAPENTIN 300 MG: 300 CAPSULE ORAL at 08:56

## 2021-01-01 RX ADMIN — HEPARIN SODIUM 3800 UNITS: 1000 INJECTION INTRAVENOUS; SUBCUTANEOUS at 13:52

## 2021-01-01 RX ADMIN — ATORVASTATIN CALCIUM 80 MG: 40 TABLET, FILM COATED ORAL at 21:28

## 2021-01-01 RX ADMIN — LAMOTRIGINE 200 MG: 100 TABLET ORAL at 12:08

## 2021-01-01 RX ADMIN — POTASSIUM CHLORIDE 20 MEQ: 1500 TABLET, EXTENDED RELEASE ORAL at 08:02

## 2021-01-01 RX ADMIN — ASCORBIC ACID, THIAMINE MONONITRATE,RIBOFLAVIN, NIACINAMIDE, PYRIDOXINE HYDROCHLORIDE, FOLIC ACID, CYANOCOBALAMIN, BIOTIN, CALCIUM PANTOTHENATE, 1 MG: 100; 1.5; 1.7; 20; 10; 1; 6000; 150000; 5 CAPSULE, LIQUID FILLED ORAL at 21:32

## 2021-01-01 RX ADMIN — CLOPIDOGREL BISULFATE 75 MG: 75 TABLET ORAL at 09:25

## 2021-01-01 RX ADMIN — BENZTROPINE MESYLATE 1 MG: 1 TABLET ORAL at 21:25

## 2021-01-01 RX ADMIN — BUSPIRONE HYDROCHLORIDE 30 MG: 5 TABLET ORAL at 07:52

## 2021-01-01 RX ADMIN — Medication 2 PUFF: at 07:00

## 2021-01-01 RX ADMIN — FUROSEMIDE 20 MG/HR: 10 INJECTION, SOLUTION INTRAMUSCULAR; INTRAVENOUS at 02:11

## 2021-01-01 RX ADMIN — MIDODRINE HYDROCHLORIDE 10 MG: 10 TABLET ORAL at 11:51

## 2021-01-01 RX ADMIN — ASCORBIC ACID, THIAMINE MONONITRATE,RIBOFLAVIN, NIACINAMIDE, PYRIDOXINE HYDROCHLORIDE, FOLIC ACID, CYANOCOBALAMIN, BIOTIN, CALCIUM PANTOTHENATE, 1 MG: 100; 1.5; 1.7; 20; 10; 1; 6000; 150000; 5 CAPSULE, LIQUID FILLED ORAL at 08:55

## 2021-01-01 RX ADMIN — DOXEPIN HYDROCHLORIDE 25 MG: 25 CAPSULE ORAL at 22:10

## 2021-01-01 RX ADMIN — HYDROMORPHONE HYDROCHLORIDE 1 MG: 1 INJECTION, SOLUTION INTRAMUSCULAR; INTRAVENOUS; SUBCUTANEOUS at 04:38

## 2021-01-01 RX ADMIN — ENOXAPARIN SODIUM 30 MG: 30 INJECTION, SOLUTION INTRAVENOUS; SUBCUTANEOUS at 08:08

## 2021-01-01 RX ADMIN — TORSEMIDE 50 MG: 100 TABLET ORAL at 09:31

## 2021-01-01 RX ADMIN — LAMOTRIGINE 200 MG: 100 TABLET ORAL at 10:12

## 2021-01-01 RX ADMIN — POTASSIUM CHLORIDE 10 MEQ: 7.46 INJECTION, SOLUTION INTRAVENOUS at 01:13

## 2021-01-01 RX ADMIN — ARFORMOTEROL TARTRATE 15 MCG: 15 SOLUTION RESPIRATORY (INHALATION) at 19:45

## 2021-01-01 RX ADMIN — ATORVASTATIN CALCIUM 80 MG: 40 TABLET, FILM COATED ORAL at 08:40

## 2021-01-01 RX ADMIN — METHOCARBAMOL TABLETS 500 MG: 500 TABLET, COATED ORAL at 21:01

## 2021-01-01 RX ADMIN — METOPROLOL TARTRATE 25 MG: 25 TABLET, FILM COATED ORAL at 21:15

## 2021-01-01 RX ADMIN — INSULIN LISPRO 1 UNITS: 100 INJECTION, SOLUTION INTRAVENOUS; SUBCUTANEOUS at 06:25

## 2021-01-01 RX ADMIN — Medication 10 ML: at 08:08

## 2021-01-01 RX ADMIN — APIXABAN 5 MG: 5 TABLET, FILM COATED ORAL at 08:52

## 2021-01-01 RX ADMIN — Medication 2 PUFF: at 18:53

## 2021-01-01 RX ADMIN — OXYCODONE 5 MG: 5 TABLET ORAL at 22:19

## 2021-01-01 RX ADMIN — INSULIN LISPRO 1 UNITS: 100 INJECTION, SOLUTION INTRAVENOUS; SUBCUTANEOUS at 13:14

## 2021-01-01 RX ADMIN — TRAZODONE HYDROCHLORIDE 100 MG: 100 TABLET ORAL at 22:44

## 2021-01-01 RX ADMIN — POTASSIUM CHLORIDE 40 MEQ: 1500 TABLET, EXTENDED RELEASE ORAL at 20:28

## 2021-01-01 RX ADMIN — MORPHINE SULFATE 2 MG: 2 INJECTION, SOLUTION INTRAMUSCULAR; INTRAVENOUS at 05:54

## 2021-01-01 RX ADMIN — INSULIN LISPRO 10 UNITS: 100 INJECTION, SOLUTION INTRAVENOUS; SUBCUTANEOUS at 11:51

## 2021-01-01 RX ADMIN — HYDROCODONE BITARTRATE AND ACETAMINOPHEN 1 TABLET: 5; 325 TABLET ORAL at 08:52

## 2021-01-01 RX ADMIN — OXYCODONE HYDROCHLORIDE AND ACETAMINOPHEN 1 TABLET: 5; 325 TABLET ORAL at 21:13

## 2021-01-01 RX ADMIN — INSULIN LISPRO 6 UNITS: 100 INJECTION, SOLUTION INTRAVENOUS; SUBCUTANEOUS at 11:42

## 2021-01-01 RX ADMIN — ARIPIPRAZOLE 15 MG: 5 TABLET ORAL at 10:28

## 2021-01-01 RX ADMIN — METOPROLOL TARTRATE 25 MG: 25 TABLET, FILM COATED ORAL at 20:39

## 2021-01-01 RX ADMIN — POTASSIUM CHLORIDE 40 MEQ: 1500 TABLET, EXTENDED RELEASE ORAL at 14:32

## 2021-01-01 RX ADMIN — INSULIN LISPRO 2 UNITS: 100 INJECTION, SOLUTION INTRAVENOUS; SUBCUTANEOUS at 17:16

## 2021-01-01 RX ADMIN — SODIUM CHLORIDE 1000 ML: 9 INJECTION, SOLUTION INTRAVENOUS at 20:48

## 2021-01-01 RX ADMIN — MIDODRINE HYDROCHLORIDE 10 MG: 10 TABLET ORAL at 11:47

## 2021-01-01 RX ADMIN — PANTOPRAZOLE SODIUM 40 MG: 40 TABLET, DELAYED RELEASE ORAL at 17:07

## 2021-01-01 RX ADMIN — MIDODRINE HYDROCHLORIDE 5 MG: 5 TABLET ORAL at 22:19

## 2021-01-01 RX ADMIN — LAMOTRIGINE 200 MG: 100 TABLET ORAL at 09:18

## 2021-01-01 RX ADMIN — METHOCARBAMOL TABLETS 500 MG: 500 TABLET, COATED ORAL at 10:22

## 2021-01-01 RX ADMIN — Medication 10 ML: at 09:50

## 2021-01-01 RX ADMIN — ATORVASTATIN CALCIUM 80 MG: 80 TABLET, FILM COATED ORAL at 20:28

## 2021-01-01 RX ADMIN — Medication 2 PUFF: at 19:36

## 2021-01-01 RX ADMIN — GABAPENTIN 300 MG: 300 CAPSULE ORAL at 20:28

## 2021-01-01 RX ADMIN — OXYCODONE HYDROCHLORIDE AND ACETAMINOPHEN 1 TABLET: 5; 325 TABLET ORAL at 23:10

## 2021-01-01 RX ADMIN — TIOTROPIUM BROMIDE INHALATION SPRAY 2 PUFF: 3.12 SPRAY, METERED RESPIRATORY (INHALATION) at 08:50

## 2021-01-01 RX ADMIN — LAMOTRIGINE 200 MG: 100 TABLET ORAL at 08:58

## 2021-01-01 RX ADMIN — PANTOPRAZOLE SODIUM 40 MG: 40 TABLET, DELAYED RELEASE ORAL at 12:18

## 2021-01-01 RX ADMIN — MICONAZOLE NITRATE: 2 POWDER TOPICAL at 00:21

## 2021-01-01 RX ADMIN — TIOTROPIUM BROMIDE INHALATION SPRAY 2 PUFF: 3.12 SPRAY, METERED RESPIRATORY (INHALATION) at 07:19

## 2021-01-01 RX ADMIN — ARIPIPRAZOLE 15 MG: 5 TABLET ORAL at 12:36

## 2021-01-01 RX ADMIN — POTASSIUM CHLORIDE 10 MEQ: 7.46 INJECTION, SOLUTION INTRAVENOUS at 03:13

## 2021-01-01 RX ADMIN — POTASSIUM CHLORIDE 10 MEQ: 7.46 INJECTION, SOLUTION INTRAVENOUS at 23:38

## 2021-01-01 RX ADMIN — ATORVASTATIN CALCIUM 20 MG: 10 TABLET, FILM COATED ORAL at 21:14

## 2021-01-01 RX ADMIN — ARFORMOTEROL TARTRATE 15 MCG: 15 SOLUTION RESPIRATORY (INHALATION) at 21:42

## 2021-01-01 RX ADMIN — LAMOTRIGINE 200 MG: 100 TABLET ORAL at 12:20

## 2021-01-01 RX ADMIN — FUROSEMIDE 10 MG/HR: 10 INJECTION INTRAMUSCULAR; INTRAVENOUS at 22:53

## 2021-01-01 RX ADMIN — FUROSEMIDE 5 MG/HR: 10 INJECTION INTRAMUSCULAR; INTRAVENOUS at 06:01

## 2021-01-01 RX ADMIN — ATORVASTATIN CALCIUM 80 MG: 80 TABLET, FILM COATED ORAL at 21:30

## 2021-01-01 RX ADMIN — MIDODRINE HYDROCHLORIDE 5 MG: 5 TABLET ORAL at 08:49

## 2021-01-01 RX ADMIN — INSULIN LISPRO 2 UNITS: 100 INJECTION, SOLUTION INTRAVENOUS; SUBCUTANEOUS at 06:32

## 2021-01-01 RX ADMIN — ACETAMINOPHEN 650 MG: 325 TABLET ORAL at 17:55

## 2021-01-01 RX ADMIN — MIDODRINE HYDROCHLORIDE 5 MG: 5 TABLET ORAL at 12:19

## 2021-01-01 RX ADMIN — INSULIN LISPRO 2 UNITS: 100 INJECTION, SOLUTION INTRAVENOUS; SUBCUTANEOUS at 13:29

## 2021-01-01 RX ADMIN — PANTOPRAZOLE SODIUM 40 MG: 40 TABLET, DELAYED RELEASE ORAL at 10:41

## 2021-01-01 RX ADMIN — SODIUM CHLORIDE, PRESERVATIVE FREE 10 ML: 5 INJECTION INTRAVENOUS at 22:09

## 2021-01-01 RX ADMIN — ACETAMINOPHEN 650 MG: 325 TABLET, FILM COATED ORAL at 17:35

## 2021-01-01 RX ADMIN — LAMOTRIGINE 200 MG: 100 TABLET ORAL at 19:52

## 2021-01-01 RX ADMIN — METHOCARBAMOL TABLETS 500 MG: 500 TABLET, COATED ORAL at 21:31

## 2021-01-01 RX ADMIN — BUSPIRONE HYDROCHLORIDE 30 MG: 10 TABLET ORAL at 21:31

## 2021-01-01 RX ADMIN — HYDROCODONE BITARTRATE AND ACETAMINOPHEN 1 TABLET: 5; 325 TABLET ORAL at 14:42

## 2021-01-01 RX ADMIN — METHOCARBAMOL 500 MG: 500 TABLET ORAL at 22:44

## 2021-01-01 RX ADMIN — METOPROLOL TARTRATE 50 MG: 50 TABLET, FILM COATED ORAL at 08:56

## 2021-01-01 RX ADMIN — QUETIAPINE FUMARATE 25 MG: 25 TABLET ORAL at 20:14

## 2021-01-01 RX ADMIN — METHOCARBAMOL TABLETS 500 MG: 500 TABLET, COATED ORAL at 20:42

## 2021-01-01 RX ADMIN — OXYCODONE HYDROCHLORIDE AND ACETAMINOPHEN 1 TABLET: 5; 325 TABLET ORAL at 14:18

## 2021-01-01 RX ADMIN — MICONAZOLE NITRATE: 2 POWDER TOPICAL at 09:32

## 2021-01-01 RX ADMIN — ACETAZOLAMIDE SODIUM 500 MG: 500 INJECTION, POWDER, LYOPHILIZED, FOR SOLUTION INTRAVENOUS at 10:31

## 2021-01-01 RX ADMIN — VANCOMYCIN HYDROCHLORIDE 500 MG: 500 INJECTION, POWDER, LYOPHILIZED, FOR SOLUTION INTRAVENOUS at 15:33

## 2021-01-01 RX ADMIN — LAMOTRIGINE 200 MG: 100 TABLET ORAL at 08:03

## 2021-01-01 RX ADMIN — Medication 400 MG: at 09:34

## 2021-01-01 RX ADMIN — GABAPENTIN 300 MG: 300 CAPSULE ORAL at 09:07

## 2021-01-01 RX ADMIN — CLOPIDOGREL BISULFATE 75 MG: 75 TABLET ORAL at 09:31

## 2021-01-01 RX ADMIN — MIDODRINE HYDROCHLORIDE 10 MG: 10 TABLET ORAL at 09:26

## 2021-01-01 RX ADMIN — Medication 400 MG: at 09:31

## 2021-01-01 RX ADMIN — Medication 400 MG: at 09:41

## 2021-01-01 RX ADMIN — Medication 10 ML: at 08:57

## 2021-01-01 RX ADMIN — ARFORMOTEROL TARTRATE 15 MCG: 15 SOLUTION RESPIRATORY (INHALATION) at 08:12

## 2021-01-01 RX ADMIN — BENZTROPINE MESYLATE 1 MG: 1 TABLET ORAL at 21:31

## 2021-01-01 RX ADMIN — Medication 10 ML: at 09:30

## 2021-01-01 RX ADMIN — METHOCARBAMOL TABLETS 500 MG: 500 TABLET, COATED ORAL at 09:33

## 2021-01-01 RX ADMIN — BENZTROPINE MESYLATE 1 MG: 1 TABLET ORAL at 21:09

## 2021-01-01 RX ADMIN — CARVEDILOL 3.12 MG: 3.12 TABLET, FILM COATED ORAL at 08:03

## 2021-01-01 RX ADMIN — MUPIROCIN: 20 OINTMENT TOPICAL at 08:28

## 2021-01-01 RX ADMIN — ATORVASTATIN CALCIUM 80 MG: 80 TABLET, FILM COATED ORAL at 21:01

## 2021-01-01 RX ADMIN — APIXABAN 2.5 MG: 5 TABLET, FILM COATED ORAL at 21:14

## 2021-01-01 RX ADMIN — INSULIN LISPRO 1 UNITS: 100 INJECTION, SOLUTION INTRAVENOUS; SUBCUTANEOUS at 19:56

## 2021-01-01 RX ADMIN — INSULIN LISPRO 9 UNITS: 100 INJECTION, SOLUTION INTRAVENOUS; SUBCUTANEOUS at 20:15

## 2021-01-01 RX ADMIN — ALBUMIN (HUMAN) 12.5 G: 0.25 INJECTION, SOLUTION INTRAVENOUS at 06:34

## 2021-01-01 RX ADMIN — MIDODRINE HYDROCHLORIDE 5 MG: 5 TABLET ORAL at 22:44

## 2021-01-01 RX ADMIN — HYDROCODONE BITARTRATE AND ACETAMINOPHEN 1 TABLET: 5; 325 TABLET ORAL at 12:47

## 2021-01-01 RX ADMIN — INSULIN LISPRO 3 UNITS: 100 INJECTION, SOLUTION INTRAVENOUS; SUBCUTANEOUS at 18:33

## 2021-01-01 RX ADMIN — INSULIN GLARGINE 18 UNITS: 100 INJECTION, SOLUTION SUBCUTANEOUS at 20:40

## 2021-01-01 RX ADMIN — POTASSIUM CHLORIDE 40 MEQ: 1500 TABLET, EXTENDED RELEASE ORAL at 03:54

## 2021-01-01 RX ADMIN — Medication 15 MG: at 09:26

## 2021-01-01 RX ADMIN — PANTOPRAZOLE SODIUM 40 MG: 40 INJECTION, POWDER, FOR SOLUTION INTRAVENOUS at 08:31

## 2021-01-01 RX ADMIN — ASPIRIN 81 MG: 81 TABLET, COATED ORAL at 12:09

## 2021-01-01 RX ADMIN — APIXABAN 5 MG: 5 TABLET, FILM COATED ORAL at 09:31

## 2021-01-01 RX ADMIN — INSULIN LISPRO 6 UNITS: 100 INJECTION, SOLUTION INTRAVENOUS; SUBCUTANEOUS at 11:58

## 2021-01-01 RX ADMIN — POTASSIUM CHLORIDE 10 MEQ: 7.46 INJECTION, SOLUTION INTRAVENOUS at 02:08

## 2021-01-01 RX ADMIN — METOPROLOL TARTRATE 25 MG: 25 TABLET, FILM COATED ORAL at 09:31

## 2021-01-01 RX ADMIN — BUSPIRONE HYDROCHLORIDE 30 MG: 10 TABLET ORAL at 22:00

## 2021-01-01 RX ADMIN — APIXABAN 5 MG: 5 TABLET, FILM COATED ORAL at 21:31

## 2021-01-01 RX ADMIN — HYDROCODONE BITARTRATE AND ACETAMINOPHEN 1 TABLET: 5; 325 TABLET ORAL at 06:03

## 2021-01-01 RX ADMIN — INSULIN LISPRO 1 UNITS: 100 INJECTION, SOLUTION INTRAVENOUS; SUBCUTANEOUS at 20:34

## 2021-01-01 RX ADMIN — PREDNISONE 60 MG: 20 TABLET ORAL at 16:47

## 2021-01-01 RX ADMIN — METHOCARBAMOL TABLETS 500 MG: 500 TABLET, COATED ORAL at 10:41

## 2021-01-01 RX ADMIN — INSULIN LISPRO 2 UNITS: 100 INJECTION, SOLUTION INTRAVENOUS; SUBCUTANEOUS at 20:25

## 2021-01-01 RX ADMIN — ARFORMOTEROL TARTRATE 15 MCG: 15 SOLUTION RESPIRATORY (INHALATION) at 21:35

## 2021-01-01 RX ADMIN — BUSPIRONE HYDROCHLORIDE 30 MG: 5 TABLET ORAL at 09:08

## 2021-01-01 RX ADMIN — INSULIN LISPRO 4 UNITS: 100 INJECTION, SOLUTION INTRAVENOUS; SUBCUTANEOUS at 08:31

## 2021-01-01 RX ADMIN — LAMOTRIGINE 200 MG: 100 TABLET ORAL at 12:32

## 2021-01-01 RX ADMIN — LAMOTRIGINE 200 MG: 100 TABLET ORAL at 09:26

## 2021-01-01 RX ADMIN — MIDODRINE HYDROCHLORIDE 10 MG: 10 TABLET ORAL at 08:01

## 2021-01-01 RX ADMIN — FUROSEMIDE 20 MG/HR: 10 INJECTION, SOLUTION INTRAMUSCULAR; INTRAVENOUS at 11:37

## 2021-01-01 RX ADMIN — ARFORMOTEROL TARTRATE 15 MCG: 15 SOLUTION RESPIRATORY (INHALATION) at 10:14

## 2021-01-01 RX ADMIN — VANCOMYCIN HYDROCHLORIDE 500 MG: 500 INJECTION, POWDER, LYOPHILIZED, FOR SOLUTION INTRAVENOUS at 14:13

## 2021-01-01 RX ADMIN — ARIPIPRAZOLE 15 MG: 10 TABLET ORAL at 08:58

## 2021-01-01 RX ADMIN — ARFORMOTEROL TARTRATE 15 MCG: 15 SOLUTION RESPIRATORY (INHALATION) at 08:23

## 2021-01-01 RX ADMIN — METOPROLOL TARTRATE 50 MG: 50 TABLET, FILM COATED ORAL at 10:47

## 2021-01-01 RX ADMIN — DOXEPIN HYDROCHLORIDE 25 MG: 25 CAPSULE ORAL at 21:57

## 2021-01-01 RX ADMIN — METHOCARBAMOL 500 MG: 500 TABLET ORAL at 08:59

## 2021-01-01 RX ADMIN — Medication 400 MG: at 09:07

## 2021-01-01 RX ADMIN — DEXTROSE MONOHYDRATE 12.5 G: 25 INJECTION, SOLUTION INTRAVENOUS at 05:43

## 2021-01-01 RX ADMIN — POTASSIUM CHLORIDE 10 MEQ: 7.46 INJECTION, SOLUTION INTRAVENOUS at 23:06

## 2021-01-01 RX ADMIN — SODIUM BICARBONATE 650 MG: 650 TABLET ORAL at 09:43

## 2021-01-01 RX ADMIN — BUSPIRONE HYDROCHLORIDE 30 MG: 5 TABLET ORAL at 08:43

## 2021-01-01 RX ADMIN — INSULIN LISPRO 6 UNITS: 100 INJECTION, SOLUTION INTRAVENOUS; SUBCUTANEOUS at 09:46

## 2021-01-01 RX ADMIN — LAMOTRIGINE 200 MG: 100 TABLET ORAL at 09:34

## 2021-01-01 RX ADMIN — GABAPENTIN 300 MG: 300 CAPSULE ORAL at 20:30

## 2021-01-01 RX ADMIN — SODIUM CHLORIDE, PRESERVATIVE FREE 10 ML: 5 INJECTION INTRAVENOUS at 09:09

## 2021-01-01 RX ADMIN — PANTOPRAZOLE SODIUM 40 MG: 40 TABLET, DELAYED RELEASE ORAL at 09:08

## 2021-01-01 RX ADMIN — SODIUM CHLORIDE, PRESERVATIVE FREE 10 ML: 5 INJECTION INTRAVENOUS at 21:07

## 2021-01-01 RX ADMIN — Medication: at 10:44

## 2021-01-01 RX ADMIN — APIXABAN 5 MG: 5 TABLET, FILM COATED ORAL at 10:12

## 2021-01-01 RX ADMIN — BENZTROPINE MESYLATE 0.5 MG: 1 TABLET ORAL at 20:39

## 2021-01-01 RX ADMIN — Medication 2 PUFF: at 13:49

## 2021-01-01 RX ADMIN — APIXABAN 5 MG: 5 TABLET, FILM COATED ORAL at 08:36

## 2021-01-01 RX ADMIN — BUPRENORPHINE AND NALOXONE 1 FILM: 8; 2 FILM BUCCAL; SUBLINGUAL at 08:04

## 2021-01-01 RX ADMIN — Medication 10 ML: at 21:10

## 2021-01-01 RX ADMIN — INSULIN LISPRO 4 UNITS: 100 INJECTION, SOLUTION INTRAVENOUS; SUBCUTANEOUS at 16:43

## 2021-01-01 RX ADMIN — Medication 10 ML: at 21:25

## 2021-01-01 RX ADMIN — POLYETHYLENE GLYCOL (3350) 17 G: 17 POWDER, FOR SOLUTION ORAL at 15:29

## 2021-01-01 RX ADMIN — INSULIN LISPRO 1 UNITS: 100 INJECTION, SOLUTION INTRAVENOUS; SUBCUTANEOUS at 16:40

## 2021-01-01 RX ADMIN — SPIRONOLACTONE 50 MG: 50 TABLET ORAL at 11:23

## 2021-01-01 RX ADMIN — INSULIN GLARGINE 5 UNITS: 100 INJECTION, SOLUTION SUBCUTANEOUS at 22:00

## 2021-01-01 RX ADMIN — Medication 2 PUFF: at 09:06

## 2021-01-01 RX ADMIN — INSULIN LISPRO 6 UNITS: 100 INJECTION, SOLUTION INTRAVENOUS; SUBCUTANEOUS at 11:15

## 2021-01-01 RX ADMIN — APIXABAN 5 MG: 5 TABLET, FILM COATED ORAL at 20:39

## 2021-01-01 RX ADMIN — Medication 2 PUFF: at 07:01

## 2021-01-01 RX ADMIN — INSULIN LISPRO 1 UNITS: 100 INJECTION, SOLUTION INTRAVENOUS; SUBCUTANEOUS at 12:57

## 2021-01-01 RX ADMIN — METOPROLOL TARTRATE 25 MG: 25 TABLET, FILM COATED ORAL at 11:23

## 2021-01-01 RX ADMIN — INSULIN LISPRO 6 UNITS: 100 INJECTION, SOLUTION INTRAVENOUS; SUBCUTANEOUS at 08:47

## 2021-01-01 RX ADMIN — BUDESONIDE 500 MCG: 0.5 SUSPENSION RESPIRATORY (INHALATION) at 09:42

## 2021-01-01 RX ADMIN — MICONAZOLE NITRATE: 20 POWDER TOPICAL at 23:27

## 2021-01-01 RX ADMIN — ARIPIPRAZOLE 15 MG: 5 TABLET ORAL at 08:58

## 2021-01-01 RX ADMIN — ARIPIPRAZOLE 15 MG: 5 TABLET ORAL at 09:40

## 2021-01-01 RX ADMIN — POTASSIUM CHLORIDE 40 MEQ: 20 TABLET, EXTENDED RELEASE ORAL at 09:31

## 2021-01-01 RX ADMIN — Medication 2 PUFF: at 19:29

## 2021-01-01 RX ADMIN — INSULIN LISPRO 1 UNITS: 100 INJECTION, SOLUTION INTRAVENOUS; SUBCUTANEOUS at 20:51

## 2021-01-01 RX ADMIN — APIXABAN 5 MG: 5 TABLET, FILM COATED ORAL at 21:40

## 2021-01-01 RX ADMIN — MIDODRINE HYDROCHLORIDE 10 MG: 10 TABLET ORAL at 08:29

## 2021-01-01 RX ADMIN — OXYCODONE HYDROCHLORIDE AND ACETAMINOPHEN 1 TABLET: 5; 325 TABLET ORAL at 05:20

## 2021-01-01 RX ADMIN — POTASSIUM CHLORIDE 10 MEQ: 7.46 INJECTION, SOLUTION INTRAVENOUS at 09:21

## 2021-01-01 RX ADMIN — POTASSIUM CHLORIDE 10 MEQ: 7.46 INJECTION, SOLUTION INTRAVENOUS at 01:24

## 2021-01-01 RX ADMIN — CEFEPIME HYDROCHLORIDE 1000 MG: 1 INJECTION, POWDER, FOR SOLUTION INTRAMUSCULAR; INTRAVENOUS at 21:18

## 2021-01-01 RX ADMIN — DOXEPIN HYDROCHLORIDE 25 MG: 25 CAPSULE ORAL at 20:39

## 2021-01-01 RX ADMIN — TORSEMIDE 100 MG: 100 TABLET ORAL at 08:42

## 2021-01-01 RX ADMIN — APIXABAN 5 MG: 5 TABLET, FILM COATED ORAL at 09:41

## 2021-01-01 RX ADMIN — SODIUM CHLORIDE: 9 INJECTION, SOLUTION INTRAVENOUS at 17:22

## 2021-01-01 RX ADMIN — HYDROCODONE BITARTRATE AND ACETAMINOPHEN 1 TABLET: 5; 325 TABLET ORAL at 10:41

## 2021-01-01 RX ADMIN — MICONAZOLE NITRATE: 20 POWDER TOPICAL at 07:47

## 2021-01-01 RX ADMIN — INSULIN LISPRO 2 UNITS: 100 INJECTION, SOLUTION INTRAVENOUS; SUBCUTANEOUS at 23:39

## 2021-01-01 RX ADMIN — BUSPIRONE HYDROCHLORIDE 30 MG: 5 TABLET ORAL at 11:00

## 2021-01-01 RX ADMIN — INSULIN LISPRO 1 UNITS: 100 INJECTION, SOLUTION INTRAVENOUS; SUBCUTANEOUS at 11:58

## 2021-01-01 RX ADMIN — GABAPENTIN 300 MG: 300 CAPSULE ORAL at 08:38

## 2021-01-01 RX ADMIN — OXYCODONE HYDROCHLORIDE AND ACETAMINOPHEN 1 TABLET: 5; 325 TABLET ORAL at 22:37

## 2021-01-01 RX ADMIN — CLOPIDOGREL BISULFATE 75 MG: 75 TABLET ORAL at 08:55

## 2021-01-01 RX ADMIN — INSULIN LISPRO 6 UNITS: 100 INJECTION, SOLUTION INTRAVENOUS; SUBCUTANEOUS at 13:39

## 2021-01-01 RX ADMIN — POTASSIUM CHLORIDE 20 MEQ: 400 INJECTION, SOLUTION INTRAVENOUS at 03:41

## 2021-01-01 RX ADMIN — POTASSIUM CHLORIDE 40 MEQ: 750 TABLET, EXTENDED RELEASE ORAL at 12:18

## 2021-01-01 RX ADMIN — POTASSIUM CHLORIDE 20 MEQ: 400 INJECTION, SOLUTION INTRAVENOUS at 11:08

## 2021-01-01 RX ADMIN — ARFORMOTEROL TARTRATE 15 MCG: 15 SOLUTION RESPIRATORY (INHALATION) at 08:48

## 2021-01-01 RX ADMIN — INSULIN GLARGINE 18 UNITS: 100 INJECTION, SOLUTION SUBCUTANEOUS at 20:51

## 2021-01-01 RX ADMIN — MIDODRINE HYDROCHLORIDE 5 MG: 5 TABLET ORAL at 20:14

## 2021-01-01 RX ADMIN — METOPROLOL TARTRATE 50 MG: 50 TABLET, FILM COATED ORAL at 08:44

## 2021-01-01 RX ADMIN — ATORVASTATIN CALCIUM 80 MG: 40 TABLET, FILM COATED ORAL at 20:30

## 2021-01-01 RX ADMIN — LAMOTRIGINE 200 MG: 100 TABLET ORAL at 20:39

## 2021-01-01 RX ADMIN — ARIPIPRAZOLE 15 MG: 5 TABLET ORAL at 09:34

## 2021-01-01 RX ADMIN — INSULIN LISPRO 6 UNITS: 100 INJECTION, SOLUTION INTRAVENOUS; SUBCUTANEOUS at 12:56

## 2021-01-01 RX ADMIN — BUSPIRONE HYDROCHLORIDE 30 MG: 10 TABLET ORAL at 20:39

## 2021-01-01 RX ADMIN — BUSPIRONE HYDROCHLORIDE 30 MG: 5 TABLET ORAL at 19:50

## 2021-01-01 RX ADMIN — METOPROLOL TARTRATE 25 MG: 25 TABLET, FILM COATED ORAL at 07:49

## 2021-01-01 RX ADMIN — LAMOTRIGINE 200 MG: 100 TABLET ORAL at 08:57

## 2021-01-01 RX ADMIN — AMIODARONE HYDROCHLORIDE 200 MG: 200 TABLET ORAL at 09:26

## 2021-01-01 RX ADMIN — TORSEMIDE 100 MG: 100 TABLET ORAL at 08:06

## 2021-01-01 RX ADMIN — TIOTROPIUM BROMIDE INHALATION SPRAY 2 PUFF: 3.12 SPRAY, METERED RESPIRATORY (INHALATION) at 07:21

## 2021-01-01 RX ADMIN — PANTOPRAZOLE SODIUM 40 MG: 40 TABLET, DELAYED RELEASE ORAL at 08:03

## 2021-01-01 RX ADMIN — INSULIN LISPRO 2 UNITS: 100 INJECTION, SOLUTION INTRAVENOUS; SUBCUTANEOUS at 17:55

## 2021-01-01 RX ADMIN — INSULIN LISPRO 6 UNITS: 100 INJECTION, SOLUTION INTRAVENOUS; SUBCUTANEOUS at 09:34

## 2021-01-01 RX ADMIN — INSULIN LISPRO 1 UNITS: 100 INJECTION, SOLUTION INTRAVENOUS; SUBCUTANEOUS at 17:32

## 2021-01-01 RX ADMIN — HEPARIN SODIUM 5000 UNITS: 5000 INJECTION INTRAVENOUS; SUBCUTANEOUS at 13:09

## 2021-01-01 RX ADMIN — DOXEPIN HYDROCHLORIDE 25 MG: 25 CAPSULE ORAL at 21:02

## 2021-01-01 RX ADMIN — POTASSIUM CHLORIDE 20 MEQ: 400 INJECTION, SOLUTION INTRAVENOUS at 14:24

## 2021-01-01 ASSESSMENT — PAIN SCALES - GENERAL
PAINLEVEL_OUTOF10: 6
PAINLEVEL_OUTOF10: 9
PAINLEVEL_OUTOF10: 6
PAINLEVEL_OUTOF10: 8
PAINLEVEL_OUTOF10: 8
PAINLEVEL_OUTOF10: 0
PAINLEVEL_OUTOF10: 0
PAINLEVEL_OUTOF10: 7
PAINLEVEL_OUTOF10: 8
PAINLEVEL_OUTOF10: 8
PAINLEVEL_OUTOF10: 0
PAINLEVEL_OUTOF10: 7
PAINLEVEL_OUTOF10: 3
PAINLEVEL_OUTOF10: 0
PAINLEVEL_OUTOF10: 8
PAINLEVEL_OUTOF10: 8
PAINLEVEL_OUTOF10: 10
PAINLEVEL_OUTOF10: 0
PAINLEVEL_OUTOF10: 8
PAINLEVEL_OUTOF10: 0
PAINLEVEL_OUTOF10: 8
PAINLEVEL_OUTOF10: 6
PAINLEVEL_OUTOF10: 8
PAINLEVEL_OUTOF10: 10
PAINLEVEL_OUTOF10: 3
PAINLEVEL_OUTOF10: 5
PAINLEVEL_OUTOF10: 5
PAINLEVEL_OUTOF10: 0
PAINLEVEL_OUTOF10: 8
PAINLEVEL_OUTOF10: 0
PAINLEVEL_OUTOF10: 4
PAINLEVEL_OUTOF10: 9
PAINLEVEL_OUTOF10: 8
PAINLEVEL_OUTOF10: 10
PAINLEVEL_OUTOF10: 8
PAINLEVEL_OUTOF10: 3
PAINLEVEL_OUTOF10: 0
PAINLEVEL_OUTOF10: 8
PAINLEVEL_OUTOF10: 8
PAINLEVEL_OUTOF10: 0
PAINLEVEL_OUTOF10: 8
PAINLEVEL_OUTOF10: 8
PAINLEVEL_OUTOF10: 10
PAINLEVEL_OUTOF10: 0
PAINLEVEL_OUTOF10: 0
PAINLEVEL_OUTOF10: 9
PAINLEVEL_OUTOF10: 8
PAINLEVEL_OUTOF10: 0
PAINLEVEL_OUTOF10: 8
PAINLEVEL_OUTOF10: 0
PAINLEVEL_OUTOF10: 8
PAINLEVEL_OUTOF10: 0
PAINLEVEL_OUTOF10: 6
PAINLEVEL_OUTOF10: 8
PAINLEVEL_OUTOF10: 10
PAINLEVEL_OUTOF10: 8
PAINLEVEL_OUTOF10: 8
PAINLEVEL_OUTOF10: 0
PAINLEVEL_OUTOF10: 3
PAINLEVEL_OUTOF10: 0
PAINLEVEL_OUTOF10: 6
PAINLEVEL_OUTOF10: 8
PAINLEVEL_OUTOF10: 8
PAINLEVEL_OUTOF10: 0
PAINLEVEL_OUTOF10: 8
PAINLEVEL_OUTOF10: 8
PAINLEVEL_OUTOF10: 9
PAINLEVEL_OUTOF10: 9
PAINLEVEL_OUTOF10: 7
PAINLEVEL_OUTOF10: 9
PAINLEVEL_OUTOF10: 7
PAINLEVEL_OUTOF10: 8
PAINLEVEL_OUTOF10: 0
PAINLEVEL_OUTOF10: 8
PAINLEVEL_OUTOF10: 9
PAINLEVEL_OUTOF10: 8
PAINLEVEL_OUTOF10: 8
PAINLEVEL_OUTOF10: 7
PAINLEVEL_OUTOF10: 0
PAINLEVEL_OUTOF10: 5
PAINLEVEL_OUTOF10: 0
PAINLEVEL_OUTOF10: 10
PAINLEVEL_OUTOF10: 8
PAINLEVEL_OUTOF10: 8
PAINLEVEL_OUTOF10: 0
PAINLEVEL_OUTOF10: 0
PAINLEVEL_OUTOF10: 8
PAINLEVEL_OUTOF10: 5
PAINLEVEL_OUTOF10: 8
PAINLEVEL_OUTOF10: 5
PAINLEVEL_OUTOF10: 9
PAINLEVEL_OUTOF10: 7
PAINLEVEL_OUTOF10: 0
PAINLEVEL_OUTOF10: 8
PAINLEVEL_OUTOF10: 0
PAINLEVEL_OUTOF10: 8
PAINLEVEL_OUTOF10: 0
PAINLEVEL_OUTOF10: 8
PAINLEVEL_OUTOF10: 0
PAINLEVEL_OUTOF10: 8
PAINLEVEL_OUTOF10: 0
PAINLEVEL_OUTOF10: 10
PAINLEVEL_OUTOF10: 8
PAINLEVEL_OUTOF10: 0
PAINLEVEL_OUTOF10: 9
PAINLEVEL_OUTOF10: 2
PAINLEVEL_OUTOF10: 7
PAINLEVEL_OUTOF10: 9
PAINLEVEL_OUTOF10: 0
PAINLEVEL_OUTOF10: 6
PAINLEVEL_OUTOF10: 8
PAINLEVEL_OUTOF10: 0
PAINLEVEL_OUTOF10: 0
PAINLEVEL_OUTOF10: 10
PAINLEVEL_OUTOF10: 9
PAINLEVEL_OUTOF10: 8
PAINLEVEL_OUTOF10: 8
PAINLEVEL_OUTOF10: 0
PAINLEVEL_OUTOF10: 7
PAINLEVEL_OUTOF10: 4
PAINLEVEL_OUTOF10: 0
PAINLEVEL_OUTOF10: 8
PAINLEVEL_OUTOF10: 0
PAINLEVEL_OUTOF10: 8
PAINLEVEL_OUTOF10: 0
PAINLEVEL_OUTOF10: 8
PAINLEVEL_OUTOF10: 8
PAINLEVEL_OUTOF10: 0
PAINLEVEL_OUTOF10: 8
PAINLEVEL_OUTOF10: 0
PAINLEVEL_OUTOF10: 8
PAINLEVEL_OUTOF10: 7
PAINLEVEL_OUTOF10: 0
PAINLEVEL_OUTOF10: 0
PAINLEVEL_OUTOF10: 8
PAINLEVEL_OUTOF10: 8
PAINLEVEL_OUTOF10: 0
PAINLEVEL_OUTOF10: 4
PAINLEVEL_OUTOF10: 0
PAINLEVEL_OUTOF10: 4
PAINLEVEL_OUTOF10: 0
PAINLEVEL_OUTOF10: 0
PAINLEVEL_OUTOF10: 8
PAINLEVEL_OUTOF10: 6
PAINLEVEL_OUTOF10: 0
PAINLEVEL_OUTOF10: 8
PAINLEVEL_OUTOF10: 0
PAINLEVEL_OUTOF10: 0
PAINLEVEL_OUTOF10: 6
PAINLEVEL_OUTOF10: 0
PAINLEVEL_OUTOF10: 6
PAINLEVEL_OUTOF10: 9
PAINLEVEL_OUTOF10: 8
PAINLEVEL_OUTOF10: 0
PAINLEVEL_OUTOF10: 9
PAINLEVEL_OUTOF10: 6
PAINLEVEL_OUTOF10: 0
PAINLEVEL_OUTOF10: 8
PAINLEVEL_OUTOF10: 0
PAINLEVEL_OUTOF10: 7
PAINLEVEL_OUTOF10: 0
PAINLEVEL_OUTOF10: 0
PAINLEVEL_OUTOF10: 10
PAINLEVEL_OUTOF10: 9
PAINLEVEL_OUTOF10: 8
PAINLEVEL_OUTOF10: 0
PAINLEVEL_OUTOF10: 0
PAINLEVEL_OUTOF10: 8
PAINLEVEL_OUTOF10: 9
PAINLEVEL_OUTOF10: 5
PAINLEVEL_OUTOF10: 0
PAINLEVEL_OUTOF10: 2
PAINLEVEL_OUTOF10: 6
PAINLEVEL_OUTOF10: 10
PAINLEVEL_OUTOF10: 0
PAINLEVEL_OUTOF10: 7
PAINLEVEL_OUTOF10: 8
PAINLEVEL_OUTOF10: 10
PAINLEVEL_OUTOF10: 0
PAINLEVEL_OUTOF10: 7
PAINLEVEL_OUTOF10: 8
PAINLEVEL_OUTOF10: 0
PAINLEVEL_OUTOF10: 0
PAINLEVEL_OUTOF10: 8
PAINLEVEL_OUTOF10: 0
PAINLEVEL_OUTOF10: 2
PAINLEVEL_OUTOF10: 8
PAINLEVEL_OUTOF10: 0
PAINLEVEL_OUTOF10: 8
PAINLEVEL_OUTOF10: 8

## 2021-01-01 ASSESSMENT — PAIN DESCRIPTION - DESCRIPTORS
DESCRIPTORS: ACHING
DESCRIPTORS: ACHING;CONSTANT
DESCRIPTORS: ACHING
DESCRIPTORS: PATIENT UNABLE TO DESCRIBE
DESCRIPTORS: ACHING
DESCRIPTORS: ACHING
DESCRIPTORS: ACHING;CONSTANT
DESCRIPTORS: PATIENT UNABLE TO DESCRIBE
DESCRIPTORS: ACHING;CONSTANT
DESCRIPTORS: ACHING;CONSTANT
DESCRIPTORS: PATIENT UNABLE TO DESCRIBE
DESCRIPTORS: ACHING;SHARP
DESCRIPTORS: ACHING
DESCRIPTORS: STABBING;THROBBING
DESCRIPTORS: ACHING
DESCRIPTORS: ACHING;SORE
DESCRIPTORS: ACHING
DESCRIPTORS: ACHING;SHARP
DESCRIPTORS: ACHING
DESCRIPTORS: ACHING
DESCRIPTORS: DISCOMFORT
DESCRIPTORS: ACHING
DESCRIPTORS: ACHING
DESCRIPTORS: ACHING;CONSTANT
DESCRIPTORS: ACHING
DESCRIPTORS: ACHING;PRESSURE
DESCRIPTORS: ACHING
DESCRIPTORS: ACHING;DISCOMFORT
DESCRIPTORS: ACHING
DESCRIPTORS: ACHING;CONSTANT;SHARP
DESCRIPTORS: ACHING
DESCRIPTORS: ACHING;SHARP
DESCRIPTORS: PINS AND NEEDLES
DESCRIPTORS: ACHING
DESCRIPTORS: ACHING
DESCRIPTORS: PINS AND NEEDLES
DESCRIPTORS: PATIENT UNABLE TO DESCRIBE
DESCRIPTORS: ACHING
DESCRIPTORS: ACHING;SHARP
DESCRIPTORS: ACHING
DESCRIPTORS: ACHING;SHARP
DESCRIPTORS: ACHING
DESCRIPTORS: NUMBNESS
DESCRIPTORS: ACHING

## 2021-01-01 ASSESSMENT — PAIN DESCRIPTION - PAIN TYPE
TYPE: ACUTE PAIN
TYPE: ACUTE PAIN;CHRONIC PAIN;SURGICAL PAIN
TYPE: CHRONIC PAIN
TYPE: ACUTE PAIN
TYPE: ACUTE PAIN
TYPE: CHRONIC PAIN
TYPE: CHRONIC PAIN
TYPE: ACUTE PAIN
TYPE: CHRONIC PAIN
TYPE: CHRONIC PAIN
TYPE: ACUTE PAIN
TYPE: CHRONIC PAIN
TYPE: ACUTE PAIN
TYPE: CHRONIC PAIN
TYPE: ACUTE PAIN
TYPE: ACUTE PAIN
TYPE: CHRONIC PAIN
TYPE: ACUTE PAIN
TYPE: ACUTE PAIN
TYPE: CHRONIC PAIN
TYPE: CHRONIC PAIN
TYPE: ACUTE PAIN
TYPE: CHRONIC PAIN
TYPE: CHRONIC PAIN
TYPE: ACUTE PAIN
TYPE: CHRONIC PAIN
TYPE: ACUTE PAIN
TYPE: CHRONIC PAIN
TYPE: ACUTE PAIN
TYPE: CHRONIC PAIN
TYPE: ACUTE PAIN
TYPE: CHRONIC PAIN
TYPE: ACUTE PAIN
TYPE: ACUTE PAIN
TYPE: CHRONIC PAIN
TYPE: CHRONIC PAIN
TYPE: ACUTE PAIN
TYPE: ACUTE PAIN
TYPE: ACUTE PAIN;CHRONIC PAIN
TYPE: ACUTE PAIN
TYPE: CHRONIC PAIN
TYPE: ACUTE PAIN;SURGICAL PAIN
TYPE: ACUTE PAIN
TYPE: ACUTE PAIN
TYPE: CHRONIC PAIN
TYPE: ACUTE PAIN
TYPE: CHRONIC PAIN
TYPE: ACUTE PAIN
TYPE: CHRONIC PAIN
TYPE: ACUTE PAIN
TYPE: CHRONIC PAIN
TYPE: ACUTE PAIN
TYPE: ACUTE PAIN
TYPE: CHRONIC PAIN

## 2021-01-01 ASSESSMENT — PAIN DESCRIPTION - ONSET
ONSET: ON-GOING
ONSET: SUDDEN
ONSET: ON-GOING
ONSET: ON-GOING
ONSET: GRADUAL
ONSET: ON-GOING
ONSET: GRADUAL
ONSET: ON-GOING

## 2021-01-01 ASSESSMENT — PAIN DESCRIPTION - FREQUENCY
FREQUENCY: CONTINUOUS
FREQUENCY: INTERMITTENT
FREQUENCY: CONTINUOUS
FREQUENCY: INTERMITTENT
FREQUENCY: CONTINUOUS
FREQUENCY: INTERMITTENT
FREQUENCY: CONTINUOUS
FREQUENCY: CONTINUOUS
FREQUENCY: INTERMITTENT
FREQUENCY: CONTINUOUS
FREQUENCY: INTERMITTENT
FREQUENCY: CONTINUOUS
FREQUENCY: CONTINUOUS
FREQUENCY: INTERMITTENT
FREQUENCY: CONTINUOUS
FREQUENCY: INTERMITTENT
FREQUENCY: CONTINUOUS

## 2021-01-01 ASSESSMENT — PAIN - FUNCTIONAL ASSESSMENT
PAIN_FUNCTIONAL_ASSESSMENT: PREVENTS OR INTERFERES SOME ACTIVE ACTIVITIES AND ADLS
PAIN_FUNCTIONAL_ASSESSMENT: PREVENTS OR INTERFERES WITH MANY ACTIVE NOT PASSIVE ACTIVITIES
PAIN_FUNCTIONAL_ASSESSMENT: ACTIVITIES ARE NOT PREVENTED
PAIN_FUNCTIONAL_ASSESSMENT: PREVENTS OR INTERFERES SOME ACTIVE ACTIVITIES AND ADLS
PAIN_FUNCTIONAL_ASSESSMENT: ACTIVITIES ARE NOT PREVENTED
PAIN_FUNCTIONAL_ASSESSMENT: ACTIVITIES ARE NOT PREVENTED
PAIN_FUNCTIONAL_ASSESSMENT: PREVENTS OR INTERFERES WITH ALL ACTIVE AND SOME PASSIVE ACTIVITIES
PAIN_FUNCTIONAL_ASSESSMENT: ACTIVITIES ARE NOT PREVENTED
PAIN_FUNCTIONAL_ASSESSMENT: ACTIVITIES ARE NOT PREVENTED
PAIN_FUNCTIONAL_ASSESSMENT: PREVENTS OR INTERFERES SOME ACTIVE ACTIVITIES AND ADLS
PAIN_FUNCTIONAL_ASSESSMENT: ACTIVITIES ARE NOT PREVENTED
PAIN_FUNCTIONAL_ASSESSMENT: PREVENTS OR INTERFERES SOME ACTIVE ACTIVITIES AND ADLS
PAIN_FUNCTIONAL_ASSESSMENT: ACTIVITIES ARE NOT PREVENTED
PAIN_FUNCTIONAL_ASSESSMENT: PREVENTS OR INTERFERES SOME ACTIVE ACTIVITIES AND ADLS
PAIN_FUNCTIONAL_ASSESSMENT: ACTIVITIES ARE NOT PREVENTED
PAIN_FUNCTIONAL_ASSESSMENT: PREVENTS OR INTERFERES SOME ACTIVE ACTIVITIES AND ADLS
PAIN_FUNCTIONAL_ASSESSMENT: PREVENTS OR INTERFERES SOME ACTIVE ACTIVITIES AND ADLS
PAIN_FUNCTIONAL_ASSESSMENT: ACTIVITIES ARE NOT PREVENTED
PAIN_FUNCTIONAL_ASSESSMENT: PREVENTS OR INTERFERES SOME ACTIVE ACTIVITIES AND ADLS
PAIN_FUNCTIONAL_ASSESSMENT: PREVENTS OR INTERFERES WITH ALL ACTIVE AND SOME PASSIVE ACTIVITIES
PAIN_FUNCTIONAL_ASSESSMENT: ACTIVITIES ARE NOT PREVENTED
PAIN_FUNCTIONAL_ASSESSMENT: ACTIVITIES ARE NOT PREVENTED
PAIN_FUNCTIONAL_ASSESSMENT: PREVENTS OR INTERFERES SOME ACTIVE ACTIVITIES AND ADLS
PAIN_FUNCTIONAL_ASSESSMENT: ACTIVITIES ARE NOT PREVENTED
PAIN_FUNCTIONAL_ASSESSMENT: PREVENTS OR INTERFERES SOME ACTIVE ACTIVITIES AND ADLS
PAIN_FUNCTIONAL_ASSESSMENT: PREVENTS OR INTERFERES SOME ACTIVE ACTIVITIES AND ADLS
PAIN_FUNCTIONAL_ASSESSMENT: ACTIVITIES ARE NOT PREVENTED

## 2021-01-01 ASSESSMENT — PAIN SCALES - WONG BAKER
WONGBAKER_NUMERICALRESPONSE: 0
WONGBAKER_NUMERICALRESPONSE: 4
WONGBAKER_NUMERICALRESPONSE: 0
WONGBAKER_NUMERICALRESPONSE: 2
WONGBAKER_NUMERICALRESPONSE: 0

## 2021-01-01 ASSESSMENT — PAIN DESCRIPTION - LOCATION
LOCATION: LEG
LOCATION: BUTTOCKS;NECK
LOCATION: BACK
LOCATION: BACK
LOCATION: BUTTOCKS;COCCYX
LOCATION: GENERALIZED
LOCATION: BACK
LOCATION: BACK
LOCATION: BACK;LEG
LOCATION: GENERALIZED
LOCATION: BACK
LOCATION: BACK;LEG
LOCATION: BUTTOCKS
LOCATION: BACK
LOCATION: BACK;LEG
LOCATION: LEG
LOCATION: BACK;LEG
LOCATION: BACK
LOCATION: ABDOMEN
LOCATION: BACK;LEG
LOCATION: BACK
LOCATION: GENERALIZED
LOCATION: BACK
LOCATION: LEG
LOCATION: BACK;LEG
LOCATION: ABDOMEN
LOCATION: BACK;LEG
LOCATION: BACK
LOCATION: LEG
LOCATION: FOOT
LOCATION: BACK
LOCATION: BACK
LOCATION: BACK;LEG
LOCATION: LEG
LOCATION: LEG
LOCATION: ABDOMEN
LOCATION: BACK
LOCATION: BACK;LEG
LOCATION: ABDOMEN
LOCATION: BACK
LOCATION: GENERALIZED
LOCATION: BUTTOCKS
LOCATION: GENERALIZED
LOCATION: BACK
LOCATION: LEG
LOCATION: KNEE
LOCATION: LEG
LOCATION: BACK;BUTTOCKS;NECK
LOCATION: BACK;LEG
LOCATION: BACK
LOCATION: HIP
LOCATION: BACK
LOCATION: BACK
LOCATION: ABDOMEN
LOCATION: ABDOMEN
LOCATION: GENERALIZED
LOCATION: BACK
LOCATION: LEG
LOCATION: BACK;LEG
LOCATION: BACK
LOCATION: BACK
LOCATION: LEG
LOCATION: ABDOMEN
LOCATION: BACK;LEG
LOCATION: BACK
LOCATION: ABDOMEN
LOCATION: BACK
LOCATION: BACK;LEG
LOCATION: LEG

## 2021-01-01 ASSESSMENT — PAIN DESCRIPTION - ORIENTATION
ORIENTATION: RIGHT;LEFT
ORIENTATION: MID;LOWER
ORIENTATION: MID
ORIENTATION: RIGHT;LEFT;LOWER
ORIENTATION: RIGHT;LEFT
ORIENTATION: LOWER
ORIENTATION: RIGHT;LEFT
ORIENTATION: RIGHT
ORIENTATION: LOWER
ORIENTATION: RIGHT;MID
ORIENTATION: MID;LOWER
ORIENTATION: LEFT
ORIENTATION: MID;LOWER;RIGHT
ORIENTATION: RIGHT;LEFT
ORIENTATION: MID;LOWER;RIGHT
ORIENTATION: MID;LOWER
ORIENTATION: RIGHT;LEFT;LOWER
ORIENTATION: RIGHT;LEFT
ORIENTATION: RIGHT;LEFT;LOWER
ORIENTATION: LOWER;RIGHT;MID
ORIENTATION: LEFT
ORIENTATION: LOWER;LEFT
ORIENTATION: MID
ORIENTATION: MID;LOWER;RIGHT
ORIENTATION: LOWER
ORIENTATION: LEFT
ORIENTATION: RIGHT;LEFT
ORIENTATION: LOWER;RIGHT;MID
ORIENTATION: RIGHT;LEFT
ORIENTATION: RIGHT;LEFT;LOWER
ORIENTATION: MID;LOWER
ORIENTATION: RIGHT
ORIENTATION: LEFT
ORIENTATION: MID;LOWER
ORIENTATION: RIGHT;LEFT;LOWER
ORIENTATION: LOWER;RIGHT;MID
ORIENTATION: LEFT
ORIENTATION: MID;LOWER;RIGHT
ORIENTATION: MID;LOWER
ORIENTATION: LOWER;RIGHT;MID
ORIENTATION: LEFT
ORIENTATION: UPPER
ORIENTATION: LOWER;RIGHT;LEFT
ORIENTATION: LEFT;RIGHT
ORIENTATION: MID;LOWER
ORIENTATION: MID;RIGHT;LEFT
ORIENTATION: LOWER
ORIENTATION: LOWER;RIGHT;MID
ORIENTATION: MID;LOWER;RIGHT
ORIENTATION: LOWER;MID
ORIENTATION: LOWER
ORIENTATION: RIGHT;LEFT
ORIENTATION: LEFT
ORIENTATION: MID;LOWER
ORIENTATION: OTHER (COMMENT)

## 2021-01-01 ASSESSMENT — PAIN DESCRIPTION - PROGRESSION
CLINICAL_PROGRESSION: NOT CHANGED
CLINICAL_PROGRESSION: GRADUALLY WORSENING
CLINICAL_PROGRESSION: NOT CHANGED
CLINICAL_PROGRESSION: GRADUALLY WORSENING
CLINICAL_PROGRESSION: NOT CHANGED
CLINICAL_PROGRESSION: GRADUALLY WORSENING
CLINICAL_PROGRESSION: NOT CHANGED
CLINICAL_PROGRESSION: GRADUALLY WORSENING
CLINICAL_PROGRESSION: NOT CHANGED
CLINICAL_PROGRESSION: GRADUALLY WORSENING
CLINICAL_PROGRESSION: NOT CHANGED
CLINICAL_PROGRESSION: GRADUALLY IMPROVING
CLINICAL_PROGRESSION: NOT CHANGED
CLINICAL_PROGRESSION: GRADUALLY WORSENING
CLINICAL_PROGRESSION: NOT CHANGED

## 2021-01-01 ASSESSMENT — PULMONARY FUNCTION TESTS
FEV1/FVC_POST: 67
FEV1/FVC_PRE: 71
FEV1/FVC_PRE: 68
FEV1_PERCENT_PREDICTED_PRE: 44
FEV1_PERCENT_PREDICTED_POST: 52
FEV1/FVC_POST: 70
FEV1_PERCENT_PREDICTED_PRE: 50
FEV1_PERCENT_PREDICTED_POST: 55

## 2021-01-01 ASSESSMENT — PAIN DESCRIPTION - DIRECTION
RADIATING_TOWARDS: DENIES
RADIATING_TOWARDS: LOWER BACK

## 2021-01-01 ASSESSMENT — ENCOUNTER SYMPTOMS
EYE PAIN: 0
NAUSEA: 0
COLOR CHANGE: 0
STRIDOR: 0
VOMITING: 0
TROUBLE SWALLOWING: 0
CONSTIPATION: 0
ABDOMINAL PAIN: 0
FACIAL SWELLING: 0
TACHYPNEA: 1
SHORTNESS OF BREATH: 0
DIARRHEA: 0
SHORTNESS OF BREATH: 1
NAUSEA: 0
SHORTNESS OF BREATH: 0
VOICE CHANGE: 0
WHEEZING: 0
COLOR CHANGE: 0

## 2021-01-01 ASSESSMENT — SLEEP AND FATIGUE QUESTIONNAIRES
NECK CIRCUMFERENCE (INCHES): 15
HOW LIKELY ARE YOU TO NOD OFF OR FALL ASLEEP WHILE SITTING AND READING: 3
HOW LIKELY ARE YOU TO NOD OFF OR FALL ASLEEP WHILE SITTING QUIETLY AFTER LUNCH WITHOUT ALCOHOL: 0
HOW LIKELY ARE YOU TO NOD OFF OR FALL ASLEEP WHILE SITTING INACTIVE IN A PUBLIC PLACE: 3
HOW LIKELY ARE YOU TO NOD OFF OR FALL ASLEEP WHILE SITTING AND TALKING TO SOMEONE: 3
HOW LIKELY ARE YOU TO NOD OFF OR FALL ASLEEP WHILE SITTING AND TALKING TO SOMEONE: 3
HOW LIKELY ARE YOU TO NOD OFF OR FALL ASLEEP WHILE SITTING INACTIVE IN A PUBLIC PLACE: 3
HOW LIKELY ARE YOU TO NOD OFF OR FALL ASLEEP WHILE SITTING QUIETLY AFTER LUNCH WITHOUT ALCOHOL: 2
HOW LIKELY ARE YOU TO NOD OFF OR FALL ASLEEP IN A CAR, WHILE STOPPED FOR A FEW MINUTES IN TRAFFIC: 0
HOW LIKELY ARE YOU TO NOD OFF OR FALL ASLEEP WHILE LYING DOWN TO REST IN THE AFTERNOON WHEN CIRCUMSTANCES PERMIT: 3
HOW LIKELY ARE YOU TO NOD OFF OR FALL ASLEEP WHILE WATCHING TV: 3
HOW LIKELY ARE YOU TO NOD OFF OR FALL ASLEEP WHILE WATCHING TV: 3
HOW LIKELY ARE YOU TO NOD OFF OR FALL ASLEEP WHILE LYING DOWN TO REST IN THE AFTERNOON WHEN CIRCUMSTANCES PERMIT: 0
ESS TOTAL SCORE: 15
ESS TOTAL SCORE: 20
HOW LIKELY ARE YOU TO NOD OFF OR FALL ASLEEP WHEN YOU ARE A PASSENGER IN A CAR FOR AN HOUR WITHOUT A BREAK: 3

## 2021-01-11 PROBLEM — I50.43 CHF (CONGESTIVE HEART FAILURE), NYHA CLASS I, ACUTE ON CHRONIC, COMBINED (HCC): Status: ACTIVE | Noted: 2021-01-01

## 2021-01-11 PROBLEM — I50.21 ACUTE SYSTOLIC CHF (CONGESTIVE HEART FAILURE) (HCC): Status: ACTIVE | Noted: 2021-01-01

## 2021-01-11 PROBLEM — I50.23 ACUTE ON CHRONIC SYSTOLIC CHF (CONGESTIVE HEART FAILURE) (HCC): Status: ACTIVE | Noted: 2021-01-01

## 2021-01-11 NOTE — ED NOTES
amb to br. Dressing applied to bilat lower ext. Telfa, kerlix ace. Lasix given.  bsc set up at bedside     Danny Turk RN  01/11/21 [id number]

## 2021-01-11 NOTE — Clinical Note
Patient Class: Inpatient [101]   REQUIRED: Diagnosis: Acute systolic CHF (congestive heart failure) ([facility] [105194]   Estimated Length of Stay: Estimated stay of more than 2 midnights

## 2021-01-12 NOTE — PROGRESS NOTES
Patients lower right leg is swollen significantly bigger than left. Doctor found this and ordered imaging. It is slightly warm to touch as well and patient has some pain in it.

## 2021-01-12 NOTE — PROGRESS NOTES
Patient is sitting up on the side of the bed as she has been all day. Patient denies further needs at this time.

## 2021-01-12 NOTE — PROGRESS NOTES
Handoff report given to RAYMUNDO Cunningham. Care transferred.      Electronically signed by Layla Marvin RN on 1/12/2021 at 7:44 AM

## 2021-01-12 NOTE — ED PROVIDER NOTES
[facility] PCU TELEMETRY      CHIEF COMPLAINT  Weight Gain (Patient is sent here from her doctors office due to gaining 20 lbs over the last 11 days. )       HISTORY OF PRESENT ILLNESS  Shalonda Moon is a 60 y.o. female with a past medical history of CHF, diabetes, and SHAUNNA, who presents to the ED complaining of fluid overload. Patient reports she was sent here by her PCP. She states that she has gained 20 pounds over the past 11 days. She does report fluid distribution on her bilateral lower extremities with weeping of fluid. She also reports feeling like fluid is collecting in her abdomen. She does report some shortness of breath. No palliative or provocative factors. She denies any chest pain, abdominal pain, headaches, numbness, weakness, tingling. Patient takes torsemide, she denies missing doses. Patient denies previous blood clot or active malignancy, leg swelling, hemoptysis, recent travel or surgery/prolonged immobilization, or OCP or other hormone use. They report that their most recent cardiac evaluation was: 8/2020  Cardiac Cath: Elevated filling pressures, continue diuresis. CardioMEMS had good correlation with the right heart catheterization numbers. Severe pulmonary hypertension. Patent LIMA to LAD graft however there appears to be coronary steal phenomenon due to severe left subclavian stenosis. We will consider left subclavian intervention. Will obtain follow-up vascular ultrasound to assess this. We will consult with CT surgery regarding therapeutic options    Echo: Summary   Limited only f/u for LVEF and mitral regurgitation. Technically difficult examination. The left ventricular systolic function is severely reduced with an ejection   fraction of 25%. There is hypokinesis of the apex, apical lateral, apical septum,   anterioseptum and anterior walls. Compared to previous study from 7-1-2020 no changes noted in left   ventricular function.    Moderate mitral regurgitation. Patient does smoke. No other complaints, modifying factors or associated symptoms. I have reviewed the following from the nursing documentation.     Past Medical History:   Diagnosis Date    Acute blood loss anemia 8/5/2020    Acute kidney injury ([address] 12/25/2019    Acute kidney injury superimposed on CKD ([address] 8/5/2020    Acute on chronic combined systolic and diastolic congestive heart failure ([address] 1/10/2020    Acute on chronic right-sided heart failure ([address] 08/2020    Alcohol dependence ([address] 3/10/2010    Arthritis     Asthma     CAD (coronary artery disease)     Cellulitis 6/3/2020    COPD (chronic obstructive pulmonary disease) (HCC)     Diabetic ulcer of left foot associated with type 2 diabetes mellitus, limited to breakdown of skin ([address] 1/18/2020    Diabetic ulcer of toe of right foot associated with type 2 diabetes mellitus ([address] 1/18/2020    Left renal artery stenosis ([address] 08/2020    MI (myocardial infarction) ([address] 10/07/2019    NSTEMI    Positive FIT (fecal immunochemical test) 2/28/2020    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 steal syndrome 09/01/2020    Traumatic perinephric hematoma, left, initial encounter 8/4/2020     Past Surgical History:   Procedure Laterality Date    ARTERIAL BYPASS SURGRY Left 01/06/2016    Axillary/Subclavian to Brachial Bypass w/reversed SVG    CARDIAC CATHETERIZATION  03/27/2018    Dr. Meyers Finely - at [address] [place]  01/20/2020    Dr. Leandro Hardy      pancreatitis post surgery    CORONARY ANGIOPLASTY WITH STENT PLACEMENT  03/16/2018    MELODY- 3.5 x 38 and 3.5 x 20 to Cx    CORONARY ANGIOPLASTY WITH STENT PLACEMENT  01/03/2018    MELODY- 3.0 x 38 and 2.75 x 26 and 2.75 x 8 and 2.75 x 22 to the LAD    CORONARY ANGIOPLASTY WITH STENT PLACEMENT  10/07/2019    MELODY- 2.5 x 8 to mLAD    CORONARY ARTERY BYPASS GRAFT N/A 1/27/2020    CORONARY ARTERY BYPASS GRAFTING X 1, ON PUMP BEATING HEART, INTERNAL MAMMARY ARTERY TO LEFT ANTERIOR DESCENDING ARTERY, VAS CATH INSERTION, URI, PLATELET GEL APPLICATION, 5 LEVEL BILATERAL INTERCOSTAL NERVE BLOCK, STERNAL PLATING performed by Lesley Cowden, MD at [address] Right 5/16/2019    fem-pop, performed by Stephen Taylor MD at [address]  02/14/2019    CardioMEMs insertion for CHF    ROTATOR CUFF REPAIR Left 04/22/2016    TONSILLECTOMY      TRANSESOPHAGEAL ECHOCARDIOGRAM  01/26/2020    TRANSLUMINAL ANGIOPLASTY Left 10/08/2019    with stenting, at [facility]    TRANSLUMINAL ANGIOPLASTY Left 04/29/2020    of the [facility] re-occlusion    TUMOR EXCISION      benign tumors X 2 chest & axilla    UPPER GASTROINTESTINAL ENDOSCOPY  4/25/2013    BX barretts, HH, gastritis    UPPER GASTROINTESTINAL ENDOSCOPY  12/10/2015    UPPER GASTROINTESTINAL ENDOSCOPY  01/06/2017    Gastritis    VASCULAR SURGERY Left 12/08/2015    upper extremity and mesenteric angiogram (diagnostic only)    VASCULAR SURGERY Bilateral 07/07/2020    diagnostic lower extremity angiogram only    VASCULAR SURGERY Left 08/04/2020    angioplasty and stent of renal artery    VASCULAR SURGERY Left 08/05/2020    coil embolization of branch renal artery vessel for bleeding    VASCULAR SURGERY Left 09/01/2020    angioplasty and stenting of subclavian artery     Family History   Problem Relation Age of Onset    Heart Disease Maternal Grandmother     Cancer Mother         lung and brain cancer    Cancer Maternal Aunt         lung and brain cancer     Social History     Socioeconomic History    Marital status: Single     Spouse name: Not on file    Number of children: Not on file    Years of education: Not on file    Highest education level: Not on file   Occupational History    Not on file   Social Needs    Financial resource strain: Not on file    Food insecurity     Worry: Not on file     Inability: Not on file   [employer] buprenorphine-naloxone (SUBOXONE) 8-2 MG SL film 1 Film  1 Film Sublingual BID Octavio Mills MD        traZODone (DESYREL) tablet 100 mg  100 mg Oral Nightly PRN Octavio Mills MD   100 mg at 01/11/21 2244    insulin glargine (LANTUS) injection vial 30 Units  30 Units Subcutaneous Nightly Octavio Mills MD   30 Units at 01/11/21 2308    tiotropium (SPIRIVA RESPIMAT) 2.5 MCG/ACT inhaler 2 puff  2 puff Inhalation Daily Octavio Mills MD   2 puff at 01/12/21 0727    insulin lispro (HUMALOG) injection vial 10 Units  10 Units Subcutaneous TID AC Octavio Mills MD        methocarbamol (ROBAXIN) tablet 500 mg  500 mg Oral BID Octavio Mills MD   500 mg at 01/11/21 2244    midodrine (PROAMATINE) tablet 5 mg  5 mg Oral BID Octavio Mills MD   5 mg at 01/11/21 2244    metOLazone (ZAROXOLYN) tablet 2.5 mg  2.5 mg Oral Once per day on Mon Wed Fri Octavio Mills MD        nitroGLYCERIN (NITROSTAT) SL tablet 0.4 mg  0.4 mg Sublingual Q5 Min PRN Octavio Mills MD        doxepin (SINEQUAN) capsule 25 mg  25 mg Oral Nightly Octavio Mills MD   25 mg at 01/11/21 2243    carvedilol (COREG) tablet 3.125 mg  3.125 mg Oral BID  Octavio Mills MD        atorvastatin (LIPITOR) tablet 80 mg  80 mg Oral Daily Octavio Mills MD        sodium chloride flush 0.9 % injection 10 mL  10 mL Intravenous 2 times per day Octavio Mills MD        sodium chloride flush 0.9 % injection 10 mL  10 mL Intravenous PRN Octavio Mills MD        promethazine (PHENERGAN) tablet 12.5 mg  12.5 mg Oral Q6H PRN Octavio Mills MD        polyethylene glycol (GLYCOLAX) packet 17 g  17 g Oral Daily PRN Octavio Mills MD        acetaminophen (TYLENOL) tablet 650 mg  650 mg Oral Q6H PRN Octavio Mills MD   650 mg at 01/12/21 0012    Or    acetaminophen (TYLENOL) suppository 650 mg  650 mg Rectal Q6H PRN Lilibeth James MD        furosemide (LASIX) injection 40 mg  40 mg Intravenous BID Lilibeth James MD        insulin lispro (HUMALOG) injection vial 0-12 Units  0-12 Units Subcutaneous TID WC Lilibeth James MD        insulin lispro (HUMALOG) injection vial 0-6 Units  0-6 Units Subcutaneous Nightly Lilibeth James MD   1 Units at 01/11/21 2246    ipratropium-albuterol (DUONEB) nebulizer solution 1 ampule  1 ampule Inhalation Q4H PRN Pretty Moser MD        heparin (porcine) injection 5,000 Units  5,000 Units Subcutaneous 3 times per day Rocco Chun MD   5,000 Units at 01/12/21 0541     Allergies   Allergen Reactions    Lisinopril Other (See Comments)     Severe hypotension       REVIEW OF SYSTEMS  10 systems reviewed, pertinent positives per HPI otherwise noted to be negative. PHYSICAL EXAM  BP 90/64   Pulse 88   Temp 98 °F (36.7 °C) (Oral)   Resp 20   Ht 5' 4.5\" (1.638 m)   Wt 244 lb (110.7 kg)   LMP 10/24/2012   SpO2 99%   BMI 41.24 kg/m²    GENERAL APPEARANCE: Awake and alert. Cooperative. no distress. HENT: Normocephalic. Atraumatic. Mucous membranes are moist  NECK: Supple. Full range of motion of the neck without stiffness or pain. EYES: PERRL. EOM's grossly intact. HEART/CHEST: RRR. No murmurs. Chest wall is not tender to palpation. LUNGS: Respirations unlabored. CTAB. Good air exchange. Speaking comfortably in full sentences. ABDOMEN: No tenderness. Soft. Non-distended. No masses. No organomegaly. No guarding or rebound. MUSCULOSKELETAL: BiLateral lower extremity pitting edema. Compartments soft. No deformity. No tenderness in the extremities. All extremities neurovascularly intact. SKIN: Warm and dry. No acute rashes. Picking abrasions on multiple parts of the patient's body. NEUROLOGICAL: Alert and oriented. No gross facial drooping. Strength 5/5, sensation intact. PSYCHIATRIC: Normal mood and affect.     LABS  I have reviewed all labs for this visit.    Results for orders placed or performed during the hospital encounter of 01/11/21   CBC Auto Differential   Result Value Ref Range    WBC 7.3 4.0 - 11.0 K/uL    RBC 4.42 4.00 - 5.20 M/uL    Hemoglobin 13.1 12.0 - 16.0 g/dL    Hematocrit 40.3 36.0 - 48.0 %    MCV 91.1 80.0 - 100.0 fL    MCH 29.6 26.0 - 34.0 pg    MCHC 32.5 31.0 - 36.0 g/dL    RDW 17.4 (H) 12.4 - 15.4 %    Platelets 285 182 - 811 K/uL    MPV 8.8 5.0 - 10.5 fL    Neutrophils % 78.7 %    Lymphocytes % 12.5 %    Monocytes % 6.1 %    Eosinophils % 2.2 %    Basophils % 0.5 %    Neutrophils Absolute 5.7 1.7 - 7.7 K/uL    Lymphocytes Absolute 0.9 (L) 1.0 - 5.1 K/uL    Monocytes Absolute 0.4 0.0 - 1.3 K/uL    Eosinophils Absolute 0.2 0.0 - 0.6 K/uL    Basophils Absolute 0.0 0.0 - 0.2 K/uL   Comprehensive Metabolic Panel w/ Reflex to MG   Result Value Ref Range    Sodium 130 (L) 136 - 145 mmol/L    Potassium reflex Magnesium 5.0 3.5 - 5.1 mmol/L    Chloride 86 (L) 99 - 110 mmol/L    CO2 33 (H) 21 - 32 mmol/L    Anion Gap 11 3 - 16    Glucose 265 (H) 70 - 99 mg/dL    BUN 46 (H) 7 - 20 mg/dL    CREATININE 2.8 (H) 0.6 - 1.1 mg/dL    GFR Non-African American 17 (A) >60    GFR  21 (A) >60    Calcium 9.5 8.3 - 10.6 mg/dL    Total Protein 7.2 6.4 - 8.2 g/dL    Alb 3.6 3.4 - 5.0 g/dL    Albumin/Globulin Ratio 1.0 (L) 1.1 - 2.2    Total Bilirubin 1.7 (H) 0.0 - 1.0 mg/dL    Alkaline Phosphatase 267 (H) 40 - 129 U/L    ALT 16 10 - 40 U/L    AST 23 15 - 37 U/L    Globulin 3.6 g/dL   Brain Natriuretic Peptide   Result Value Ref Range    Pro-BNP 3,512 (H) 0 - 124 pg/mL   Troponin   Result Value Ref Range    Troponin 0.02 (H) <0.01 ng/mL   Urinalysis, reflex to microscopic   Result Value Ref Range    Color, UA Yellow Straw/Yellow    Clarity, UA Clear Clear    Glucose, Ur Negative Negative mg/dL    Bilirubin Urine Negative Negative    Ketones, Urine Negative Negative mg/dL    Specific Gravity, UA 1.010 1.005 - 1.030    Blood, Urine Negative Negative    pH, UA 7.0 5.0 - 8.0    Protein, UA Negative Negative mg/dL    Urobilinogen, Urine 4.0 (A) <2.0 E.U./dL    Nitrite, Urine Negative Negative    Leukocyte Esterase, Urine Negative Negative    Microscopic Examination Not Indicated     Urine Type NotGiven    Basic Metabolic Panel   Result Value Ref Range    Sodium 134 (L) 136 - 145 mmol/L    Potassium 4.3 3.5 - 5.1 mmol/L    Chloride 93 (L) 99 - 110 mmol/L    CO2 28 21 - 32 mmol/L    Anion Gap 13 3 - 16    Glucose 219 (H) 70 - 99 mg/dL    BUN 47 (H) 7 - 20 mg/dL    CREATININE 2.3 (H) 0.6 - 1.1 mg/dL    GFR Non-African American 22 (A) >60    GFR  26 (A) >60    Calcium 9.0 8.3 - 10.6 mg/dL   Magnesium   Result Value Ref Range    Magnesium 2.00 1.80 - 2.40 mg/dL   CBC auto differential   Result Value Ref Range    WBC 7.0 4.0 - 11.0 K/uL    RBC 4.07 4.00 - 5.20 M/uL    Hemoglobin 12.3 12.0 - 16.0 g/dL    Hematocrit 37.4 36.0 - 48.0 %    MCV 92.0 80.0 - 100.0 fL    MCH 30.1 26.0 - 34.0 pg    MCHC 32.7 31.0 - 36.0 g/dL    RDW 17.1 (H) 12.4 - 15.4 %    Platelets 808 768 - 967 K/uL    MPV 8.5 5.0 - 10.5 fL    Neutrophils % 81.4 %    Lymphocytes % 9.6 %    Monocytes % 5.0 %    Eosinophils % 2.0 %    Basophils % 2.0 %    Neutrophils Absolute 5.7 1.7 - 7.7 K/uL    Lymphocytes Absolute 0.7 (L) 1.0 - 5.1 K/uL    Monocytes Absolute 0.3 0.0 - 1.3 K/uL    Eosinophils Absolute 0.1 0.0 - 0.6 K/uL    Basophils Absolute 0.1 0.0 - 0.2 K/uL   TSH   Result Value Ref Range    TSH 3.99 0.27 - 4.20 uIU/mL   Troponin   Result Value Ref Range    Troponin 0.02 (H) <0.01 ng/mL   Troponin   Result Value Ref Range    Troponin 0.02 (H) <0.01 ng/mL   COVID-19   Result Value Ref Range    SARS-CoV-2, NAAT Not Detected Not Detected   Hepatic Function Panel   Result Value Ref Range    Total Protein 6.3 (L) 6.4 - 8.2 g/dL    Alb 2.9 (L) 3.4 - 5.0 g/dL    Alkaline Phosphatase 225 (H) 40 - 129 U/L    ALT 12 10 - 40 U/L    AST 18 15 - 37 U/L    Total Bilirubin 1.2 (H) 0.0 - 1.0 mg/dL    Bilirubin, Direct 0.7 (H) 0.0 - 0.3 mg/dL    Bilirubin, Indirect 0.5 0.0 - 1.0 mg/dL   POCT Glucose   Result Value Ref Range    POC Glucose 192 (H) 70 - 99 mg/dl    Performed on ACCU-CHEK    POCT Glucose   Result Value Ref Range    POC Glucose 267 (H) 70 - 99 mg/dl    Performed on ACCU-CHEK    POCT Glucose   Result Value Ref Range    POC Glucose 166 (H) 70 - 99 mg/dl    Performed on ACCU-CHEK    EKG 12 Lead   Result Value Ref Range    Ventricular Rate 88 BPM    Atrial Rate 88 BPM    P-R Interval 186 ms    QRS Duration 136 ms    Q-T Interval 422 ms    QTc Calculation (Bazett) 510 ms    P Axis 76 degrees    R Axis -70 degrees    T Axis 73 degrees    Diagnosis       Normal sinus rhythmLeft axis deviationLeft anterior fascicular blockRight bundle branch blockAnterolateral infarct , age undeterminedNonspecific ST abnormalityAbnormal ECGNo previous ECGs availableConfirmed by GUNJAN CHAUHAN MD ([id number]) on 1/11/2021 5:41:41 PM       ECG  The Ekg interpreted by me shows  normal sinus rhythm with a rate of 88  Axis is   Left axis deviation  QTc is  prolonged  Intervals and Durations are unremarkable. ST Segments: nonspecific changes  No significant change from prior EKG dated 12/20/20    RADIOLOGY    XR CHEST (2 VW)   Final Result   Cardiomegaly. No convincing evidence for acute cardiopulmonary pathology. ED COURSE / MDM  Patient seen and evaluated. Old records reviewed. Labs and imaging reviewed and results discussed with patient. Overall patient presenting for fluid overload and shortness of breath. Physical exam remarkable for bilateral lower extremity edema. Differential diagnosis includes but is not limited to: CHF exacerbation, pneumonia, pneumothorax, pleural effusion, ACS, COPD exacerbation, asthma, pulmonary embolism, arrhythmia, anemia    EKG, laboratory studies, and chest x-ray obtained.      During the patient's ED course, the patient was given:  Medications lamoTRIgine (LAMICTAL) tablet 200 mg (200 mg Oral Given 1/11/21 2242)   ARIPiprazole (ABILIFY) tablet 15 mg (has no administration in time range)   aspirin EC tablet 81 mg (has no administration in time range)   busPIRone (BUSPAR) tablet 30 mg (30 mg Oral Given 1/11/21 2243)   magnesium oxide (MAG-OX) tablet 400 mg (has no administration in time range)   clopidogrel (PLAVIX) tablet 75 mg (has no administration in time range)   pantoprazole (PROTONIX) tablet 40 mg (has no administration in time range)   benztropine (COGENTIN) tablet 1 mg (1 mg Oral Given 1/11/21 2245)   buprenorphine-naloxone (SUBOXONE) 8-2 MG SL film 1 Film (1 Film Sublingual Not Given 1/11/21 2340)   traZODone (DESYREL) tablet 100 mg (100 mg Oral Given 1/11/21 2244)   insulin glargine (LANTUS) injection vial 30 Units (30 Units Subcutaneous Given 1/11/21 2308)   tiotropium (SPIRIVA RESPIMAT) 2.5 MCG/ACT inhaler 2 puff (2 puffs Inhalation Given 1/12/21 0727)   insulin lispro (HUMALOG) injection vial 10 Units (has no administration in time range)   methocarbamol (ROBAXIN) tablet 500 mg (500 mg Oral Given 1/11/21 2244)   midodrine (PROAMATINE) tablet 5 mg (5 mg Oral Given 1/11/21 2244)   metOLazone (ZAROXOLYN) tablet 2.5 mg (2.5 mg Oral Not Given 1/11/21 2340)   nitroGLYCERIN (NITROSTAT) SL tablet 0.4 mg (has no administration in time range)   doxepin (SINEQUAN) capsule 25 mg (25 mg Oral Given 1/11/21 2243)   carvedilol (COREG) tablet 3.125 mg (has no administration in time range)   atorvastatin (LIPITOR) tablet 80 mg (has no administration in time range)   sodium chloride flush 0.9 % injection 10 mL (10 mLs Intravenous Not Given 1/11/21 2340)   sodium chloride flush 0.9 % injection 10 mL (has no administration in time range)   promethazine (PHENERGAN) tablet 12.5 mg (has no administration in time range)   polyethylene glycol (GLYCOLAX) packet 17 g (has no administration in time range)   acetaminophen (TYLENOL) tablet 650 mg ( Oral See Alternative 1/12/21 0013)     Or   acetaminophen (TYLENOL) suppository 650 mg (650 mg Rectal Not Given 1/12/21 0013)   furosemide (LASIX) injection 40 mg (has no administration in time range)   insulin lispro (HUMALOG) injection vial 0-12 Units (has no administration in time range)   insulin lispro (HUMALOG) injection vial 0-6 Units (1 Units Subcutaneous Given 1/11/21 2246)   ipratropium-albuterol (DUONEB) nebulizer solution 1 ampule (has no administration in time range)   heparin (porcine) injection 5,000 Units (5,000 Units Subcutaneous Given 1/12/21 0541)   furosemide (LASIX) injection 20 mg (20 mg Intravenous Given 1/11/21 1848)      EKG showed no evidence of acute ischemia. Troponin was mildly elevated, patient noted to be chronically elevated. Suspect this is related to demand ischemia. Patient also noted to have an acute kidney injury which could also be the cause of her troponin elevation. At this time, I have lower suspicion for ACS. Heart score: 3. BNP was elevated, evidence of fluid overload on exam. High suspicion for CHF exacerbation. Did have some concern about Lasix administration given patient has an SHAUNNA, however hospitalist did feel this could be related to her heart failure and did suggest Lasix administration. Chest x-ray showed no evidence of pneumonia, pleural effusion, pulmonary edema, or pneumothorax. It did show cardiomegaly. At this time I have low concern for pulmonary embolism. Patient has not had a previous blood clot. Patient denies other risk factors for pulmonary embolism. Patient does not have any evidence of DVT on exam.  Patient is low risk on PERC and Wells criteria. At this time, considering that risks associated with further work-up for pulmonary embolism outweigh the likelihood of this diagnosis. Patient does have an acute kidney injury. Hospitalist thought this may be related to her heart failure. At this time we'll hold off on IV fluid administration.  Urinalysis showed no evidence of blood or infection-low suspicion for kidney stones or UTI. Patient has hyponatremia, hypochloremia, and hyperglycemia. No leukocytosis, anemia, or thrombocytopenia. At least 3 minutes of smoking cessation education was provided to the patient. Based on results of work-up, I am concerned for CHF exacerbation and SHAUNNA. At this time, do feel the patient requires admission for further work-up and management. Discussed the patient with hospital team.      CLINICAL IMPRESSION  1. Acute on chronic congestive heart failure, unspecified heart failure type ([address]    2. SHAUNNA (acute kidney injury) ([address]        Blood pressure 111/71, pulse 96, temperature 97 °F (36.1 °C), temperature source Oral, resp. rate 16, height 5' 4\" (1.626 m), weight 237 lb 11.2 oz (107.8 kg), last menstrual period 10/24/2012, SpO2 96 %, not currently breastfeeding. DISPOSITION  Kristi Vick was admitted in stable condition. DISCLAIMER: This chart was created using Dragon dictation software. Efforts were made by me to ensure accuracy, however some errors may be present due to limitations of this technology and occasionally words are not transcribed correctly.             Alea Raya MD  01/12/21 0177

## 2021-01-12 NOTE — PROGRESS NOTES
Patient admitted to room 308 from [facility]. Patient oriented to room, call light, bed rails, phone, lights and bathroom. Patient instructed about the schedule of the day including: vital sign frequency, lab draws, possible tests, frequency of MD and staff rounds, daily weights, I &O's and prescribed diet. bed alarm in place, patient aware of placement and reason. Telemetry box in place, patient aware of placement and reason. Bed locked, in lowest position, side rails up 2/4, call light within reach. Recliner Assessment  Patient is able to demonstrate the ability to move from a reclining position to an upright position within the recliner. 4 Eyes Skin Assessment     The patient is being assess for   Admission    I agree that 2 RN's have performed a thorough Head to Toe Skin Assessment on the patient. ALL assessment sites listed below have been assessed. Areas assessed by both nurses:   [x]   Head, Face, and Ears   [x]   Shoulders, Back, and Chest, Abdomen  [x]   Arms, Elbows, and Hands   [x]   Coccyx, Sacrum, and Ischium  [x]   Legs, Feet, and Heels        Venous ulcer BLE, Deep tissue right heel, scattered scabs, Right foot 2nd and 3rd digit necrosis. Scattered abrasion and bruising     **SHARE this note so that the co-signing nurse is able to place an eSignature**    Co-signer eSignature: Electronically signed by Lamin Garcia RN on 1/12/21 at 2:33 AM EST    Does the Patient have Skin Breakdown?   Yes [facility] WOUND CARE was Initiated documentation include the Shaye-wound, Wound Assessment, Measurements, Dressing Treatment, Drainage, and Color\",          Vivek Prevention initiated:  Yes   Wound Care Orders initiated:  Yes      [id number]  nurse consulted for Pressure Injury (Stage 3,4, Unstageable, DTI, NWPT, Complex wounds)and New or Established Ostomies:  Yes      Primary Nurse eSignature: Electronically signed by Kathia Messer RN on 1/12/21 at 2:32 AM EST

## 2021-01-12 NOTE — PROGRESS NOTES
RESPIRATORY THERAPY ASSESSMENT    Name:  Dominique Taylor  Medical Record Number:  [id number]  Age: 60 y.o. Gender: female  : 1965  Today's Date:  2021  Room:  /0308-01    Assessment     Is the patient being admitted for a COPD or Asthma exacerbation? No   (If yes the patient will be seen every 4 hours for the first 24 hours and then reassessed)    Patient Admission Diagnosis      Allergies  Allergies   Allergen Reactions    Lisinopril Other (See Comments)     Severe hypotension       Minimum Predicted Vital Capacity:               Actual Vital Capacity:                    Pulmonary History:COPD and Asthma  Home Oxygen Therapy:  room air  Home Respiratory Therapy:Albuterol and Tiotropium Bromide   Current Respiratory Therapy:  duoneb Q4WA, spiriva          Respiratory Severity Index(RSI)   Patients with orders for inhalation medications, oxygen, or any therapeutic treatment modality will be placed on Respiratory Protocol. They will be assessed with the first treatment and at least every 72 hours thereafter. The following severity scale will be used to determine frequency of treatment intervention.     Smoking History: Pulmonary Disease or Smoking History, Greater than 15 pack year = 2    Social History  Social History     Tobacco Use    Smoking status: Current Every Day Smoker     Packs/day: 0.50     Years: 30.00     Pack years: 15.00     Types: Cigarettes    Smokeless tobacco: Never Used   Substance Use Topics    Alcohol use: No     Comment: quit      Drug use: Never       Recent Surgical History: None = 0  Past Surgical History  Past Surgical History:   Procedure Laterality Date    ARTERIAL BYPASS SURGRY Left 2016    Axillary/Subclavian to Brachial Bypass w/reversed SVG    CARDIAC CATHETERIZATION  2018    Dr. Bhavik Arreguin - at [address]  2020    Dr. Madhavi Mendez      pancreatitis post surgery    CORONARY ANGIOPLASTY WITH STENT PLACEMENT  03/16/2018    MELODY- 3.5 x 38 and 3.5 x 20 to Cx    CORONARY ANGIOPLASTY WITH STENT PLACEMENT  01/03/2018    MELODY- 3.0 x 38 and 2.75 x 26 and 2.75 x 8 and 2.75 x 22 to the LAD    CORONARY ANGIOPLASTY WITH STENT PLACEMENT  10/07/2019    MELODY- 2.5 x 8 to [address] GRAFT N/A 1/27/2020    CORONARY ARTERY BYPASS GRAFTING X 1, ON PUMP BEATING HEART, INTERNAL MAMMARY ARTERY TO LEFT ANTERIOR DESCENDING ARTERY, VAS CATH INSERTION, URI, PLATELET GEL APPLICATION, 5 LEVEL BILATERAL INTERCOSTAL NERVE BLOCK, STERNAL PLATING performed by Vilma Gordon MD at [address] Right 5/16/2019    fem-pop, performed by Duong Stone MD at [address]  02/14/2019    CardioMEMs insertion for CHF    ROTATOR CUFF REPAIR Left 04/22/2016    TONSILLECTOMY      TRANSESOPHAGEAL ECHOCARDIOGRAM  01/26/2020    TRANSLUMINAL ANGIOPLASTY Left 10/08/2019    with stenting, at [facility]    TRANSLUMINAL ANGIOPLASTY Left 04/29/2020    of the [facility] re-occlusion    TUMOR EXCISION      benign tumors X 2 chest & axilla    UPPER GASTROINTESTINAL ENDOSCOPY  4/25/2013    BX barretts, HH, gastritis    UPPER GASTROINTESTINAL ENDOSCOPY  12/10/2015    UPPER GASTROINTESTINAL ENDOSCOPY  01/06/2017    Gastritis    VASCULAR SURGERY Left 12/08/2015    upper extremity and mesenteric angiogram (diagnostic only)    VASCULAR SURGERY Bilateral 07/07/2020    diagnostic lower extremity angiogram only    VASCULAR SURGERY Left 08/04/2020    angioplasty and stent of renal artery    VASCULAR SURGERY Left 08/05/2020    coil embolization of branch renal artery vessel for bleeding    VASCULAR SURGERY Left 09/01/2020    angioplasty and stenting of subclavian artery       Level of Consciousness: Alert, Oriented, and Cooperative = 0    Level of Activity: Walking unassisted = 0    Respiratory Pattern: Regular Pattern; RR 8-20 = 0    Breath Sounds: Diminshed bilaterally and/or crackles = 2    Sputum   ,  ,    Cough: Strong, spontaneous, non-productive = 0    Vital Signs   BP 98/68   Pulse 94   Temp 96.6 °F (35.9 °C) (Oral)   Resp 20   Ht 5' 4.5\" (1.638 m)   Wt 244 lb (110.7 kg)   LMP 10/24/2012   SpO2 98%   BMI 41.24 kg/m²   SPO2 (COPD values may differ): Greater than or equal to 92% on room air = 0    Peak Flow (asthma only): not applicable = 0    RSI: 0-4 = See once and convert to home regimen or discontinue        Plan       Goals: medication delivery    Patient/caregiver was educated on the proper method of use for Respiratory Care Devices:  No:       Level of patient/caregiver understanding able to:   ? Verbalize understanding   ? Demonstrate understanding       ? Teach back        ? Needs reinforcement       ? No available caregiver               ? Other:     Response to education:  N/A     Is patient being placed on Home Treatment Regimen? Yes     Does the patient have everything they need prior to discharge? NA     Comments: Patient chart reviewed, patient assessed. Plan of Care: change patient to PRN home use     Electronically signed by Denver Ash, RCP on 1/11/2021 at 11:07 PM    Respiratory Protocol Guidelines     1. Assessment and treatment by Respiratory Therapy will be initiated for medication and therapeutic interventions upon initiation of aerosolized medication. 2. Physician will be contacted for respiratory rate (RR) greater than 35 breaths per minute. Therapy will be held for heart rate (HR) greater than 140 beats per minute, pending direction from physician. 3. Bronchodilators will be administered via Metered Dose Inhaler (MDI) with spacer when the following criteria are met:  a. Alert and cooperative     b. HR < 140 bpm  c. RR < 30 bpm                d. Can demonstrate a 2-3 second inspiratory hold  4. Bronchodilators will be administered via Hand Held Nebulizer [facility]) to patients when ANY of the following criteria are met  a.  [employer] or

## 2021-01-12 NOTE — PROGRESS NOTES
Cardiology Consult has been called to Dr. Abilio Campos on 1/12/21.  Left voicemail 6:10 AM    Damir Solano  1/12/2021

## 2021-01-12 NOTE — PROGRESS NOTES
Shift assessment completed. See flow sheet. Medications help for NPO status. Insulin given. Patient in A&O x4. Patient denies further needs at this time. Call light within reach. Will continue to monitor.

## 2021-01-12 NOTE — PROGRESS NOTES
Nephrology Consult has been called to Dr. Rakel Olguin on 1/12/21. Spoke with answering service.  6:13 AM    Sonya Solano  1/12/2021

## 2021-01-12 NOTE — CARE COORDINATION
Case Management Assessment  Initial Evaluation      Patient Name: Idolina Cushing  YOB: 1965  Diagnosis: Acute systolic CHF (congestive heart failure) ([facility]) [I50.21]  CHF (congestive heart failure), NYHA class I, acute on chronic, combined ([facility] [I50.43]  Date / Time: 1/11/2021  3:13 PM    Admission status/Date:inpt  Chart Reviewed: Yes      Patient Interviewed: No   Family Interviewed:  No      Hospitalization in the last 30 days:  Yes      Health Care Decision Maker :     (CM - must 1st enter selection under Navigator - emergency contact- Health Care Decision Maker Relationship and pick relationship)   Who do you trust or have selected to make healthcare decisions for you      Met with: pt's friend,Yasmin via TC  Interview conducted  (bedside/phone):    Current PCP:   Afsaneh Lu PA-C      Financial  Commercial  Precert required for SNF : Y, N          3 night stay required - Y, N    ADLS  Support Systems/Care Needs:    Transportation: self    Meal Preparation: self    Housing  Living Arrangements: home with dtr and friend  Steps: 5  Intent for return to present living arrangements: Yes  Identified Issues: no    Home Care Information  Active with [address] : No Agency:(Services)     Passport/Waiver : No  :                      Phone Number:    Passport/Waiver Services: no          Durable Medical Equiptment   DME Provider:   Equipment:   Walker_x__Cane_xx__RTS___ BSC___Shower Chair___Hospital Bed___W/C____Other________  02 at ____Liter(s)---wears(frequency)_______ HHN ___ CPAP___ BiPap___   N/A____      Home O2 Use :  No    If No for home O2---if presently on O2 during hospitalization:  Yes  if yes CM to follow for potential DC O2 need  Informed of need for care provider to bring portable home O2 tank on day of discharge for nursing to connect prior to leaving:   Not Indicated  Verbalized agreement/Understanding:   Not Indicated    Community Service Affiliation  Dialysis: No    · Agency:  · Location:  · Dialysis Schedule:  · Phone:   · Fax: Other Community Services: (ex:PT/OT,Mental Health,Wound Clinic, Cardio/Pul [address])    DISCHARGE PLAN: Explained Case Management role/services. Reviewed chart. No answer on pt's room phone. no answer at pt's dtrDebra. Writer spoke with pt's friend for initial interview. Pt's cell phone number is ([phone]). Pt from home with daughter and friend and plan return. Pt has RW and cane,however, does not use any assistive devices to ambulate per friend.  Pt has used [employer] in the past. following

## 2021-01-12 NOTE — H&P
Hospital Medicine History & Physical      PCP: Marti Randolph PA-C    Date of Admission: 1/11/2021    Date of Service: Pt seen/examined on 1/11/2021 and Admitted to Inpatient with expected LOS greater than two midnights due to medical therapy. Chief Complaint:  Leg swelling      History Of Present Illness:      60 y.o. female presents with increasing leg swelling over the past two weeks with increased martin, sob. She denies chest pain except when coughing, and notes a 20 week history of persistent cough with prior negative covid tests. She recalls having some nausea with non bloody, non bilious emesis 2 hours post prandial over the past 2 days. She denies fevers, chills, generalized weakness. She denies change or non compliance of her diuretic regimen, and notes some urgency but denies urinary hesitancy, hematuria or dysuria. Currently patient is seen on room air, in no distress with only current complaint being that of increased pain from bilat legs due to increased swelling.     Past Medical History:          Diagnosis Date    Acute blood loss anemia 8/5/2020    Acute kidney injury ([address] 12/25/2019    Acute kidney injury superimposed on CKD ([address] 8/5/2020    Acute on chronic combined systolic and diastolic congestive heart failure ([address] 1/10/2020    Acute on chronic right-sided heart failure ([address] 08/2020    Alcohol dependence ([address] 3/10/2010    Arthritis     Asthma     CAD (coronary artery disease)     Cellulitis 6/3/2020    COPD (chronic obstructive pulmonary disease) (HCC)     Diabetic ulcer of left foot associated with type 2 diabetes mellitus, limited to breakdown of skin ([address] 1/18/2020    Diabetic ulcer of toe of right foot associated with type 2 diabetes mellitus ([address] 1/18/2020    Left renal artery stenosis ([address] 08/2020    MI (myocardial infarction) ([address] 10/07/2019    NSTEMI    Positive FIT (fecal immunochemical test) 2/28/2020    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 steal syndrome 09/01/2020    Traumatic perinephric hematoma, left, initial encounter 8/4/2020       Past Surgical History:          Procedure Laterality Date    ARTERIAL BYPASS SURGRY Left 01/06/2016    Axillary/Subclavian to Brachial Bypass w/reversed SVG    CARDIAC CATHETERIZATION  03/27/2018    Dr. Juan Luis Jolley - at [address]  01/20/2020    Dr. Kareen Boucher      pancreatitis post surgery    CORONARY ANGIOPLASTY WITH STENT PLACEMENT  03/16/2018    MELODY- 3.5 x 38 and 3.5 x 20 to Cx    CORONARY ANGIOPLASTY WITH STENT PLACEMENT  01/03/2018    MELODY- 3.0 x 38 and 2.75 x 26 and 2.75 x 8 and 2.75 x 22 to the LAD    CORONARY ANGIOPLASTY WITH STENT PLACEMENT  10/07/2019    MELODY- 2.5 x 8 to [address] GRAFT N/A 1/27/2020    CORONARY ARTERY BYPASS GRAFTING X 1, ON PUMP BEATING HEART, INTERNAL MAMMARY ARTERY TO LEFT ANTERIOR DESCENDING ARTERY, VAS CATH INSERTION, URI, PLATELET GEL APPLICATION, 5 LEVEL BILATERAL INTERCOSTAL NERVE BLOCK, STERNAL PLATING performed by Kavitha Aguila MD at [address] Right 5/16/2019    fem-pop, performed by Yousif Ly MD at [address]  02/14/2019    CardioMEMs insertion for CHF    ROTATOR CUFF REPAIR Left 04/22/2016    TONSILLECTOMY      TRANSESOPHAGEAL ECHOCARDIOGRAM  01/26/2020    TRANSLUMINAL ANGIOPLASTY Left 10/08/2019    with stenting, at [facility]    [address] Left 04/29/2020    of the [facility] re-occlusion    TUMOR EXCISION      benign tumors X 2 chest & axilla    UPPER GASTROINTESTINAL ENDOSCOPY  4/25/2013    BX barretts, HH, gastritis    UPPER GASTROINTESTINAL ENDOSCOPY  12/10/2015    UPPER GASTROINTESTINAL ENDOSCOPY  01/06/2017    Gastritis    VASCULAR SURGERY Left 12/08/2015    upper extremity and mesenteric angiogram (diagnostic only)    VASCULAR SURGERY Bilateral 07/07/2020    diagnostic lower extremity angiogram only    VASCULAR SURGERY Left 08/04/2020    angioplasty and stent of renal artery    VASCULAR SURGERY Left 08/05/2020    coil embolization of branch renal artery vessel for bleeding    VASCULAR SURGERY Left 09/01/2020    angioplasty and stenting of subclavian artery       Medications Prior to Admission:      Prior to Admission medications    Medication Sig Start Date End Date Taking?  Authorizing Provider   carvedilol (COREG) 3.125 MG tablet TAKE 1 TABLET BY MOUTH TWICE A DAY WITH MEALS 1/11/21  Yes TRAY Shahid CNP   atorvastatin (LIPITOR) 80 MG tablet TAKE 1 TABLET BY MOUTH EVERY DAY AT NIGHT 1/11/21  Yes TRAY Lennon CNP   doxepin (SINEQUAN) 25 MG capsule TAKE 1 CAPSULE BY MOUTH EVERY DAY AT NIGHT 12/20/20  Yes Historical Provider, MD   KLOR-CON M20 20 MEQ extended release tablet TAKE 2 TABLETS BY MOUTH 4 TIMES DAILY 1/4/21  Yes TRAY Shahid CNP   methocarbamol (ROBAXIN) 500 MG tablet Take 500 mg by mouth 2 times daily   Yes Historical Provider, MD   midodrine (PROAMATINE) 5 MG tablet Take 5 mg by mouth 2 times daily   Yes Historical Provider, MD   spironolactone (ALDACTONE) 50 MG tablet Take 50 mg by mouth daily   Yes Historical Provider, MD   torsemide (DEMADEX) 20 MG tablet Take 50 mg by mouth daily    Yes Historical Provider, MD   metOLazone (ZAROXOLYN) 2.5 MG tablet Take 2.5 mg by mouth three times a week On Mon Weds and Friday   Yes Historical Provider, MD   tiotropium (SPIRIVA RESPIMAT) 2.5 MCG/ACT AERS inhaler INHALE 2 PUFFS INTO THE LUNGS DAILY 10/13/20  Yes Jud Riley MD   Insulin Degludec (TRESIBA FLEXTOUCH) 100 UNIT/ML SOPN Inject 30 Units into the skin nightly 9/22/20  Yes TRAY Arredondo CNP   traZODone (DESYREL) 100 MG tablet Take 100 mg by mouth nightly as needed for Sleep Can take 1 or 2 tabs nightly as directed   Yes Historical Provider, MD   albuterol sulfate  (90 Base) MCG/ACT inhaler Inhale 2 puffs into the lungs every 6 hours as needed for Wheezing or Shortness of Breath 7/3/20  Yes Ruddy Hamilton MD   buprenorphine-naloxone (SUBOXONE) 8-2 MG FILM SL film Place 1 Film under the tongue 2 times daily. Yes Historical Provider, MD   benztropine (COGENTIN) 1 MG tablet Take 1 mg by mouth nightly  12/13/19  Yes Historical Provider, MD   pantoprazole (PROTONIX) 40 MG tablet Take 1 tablet by mouth 2 times daily 10/1/19  Yes Balbir Morgan MD   clopidogrel (PLAVIX) 75 MG tablet TAKE 1 TABLET BY MOUTH EVERY DAY 9/3/19  Yes TRAY Lovett CNP   magnesium oxide (MAG-OX) 400 (240 Mg) MG tablet TAKE 1 TABLET ONCE DAILY 5/1/19  Yes Leslie Barrow MD   busPIRone (BUSPAR) 30 MG tablet Take 30 mg by mouth 2 times daily  4/2/18  Yes Historical Provider, MD   aspirin EC 81 MG EC tablet Take 1 tablet by mouth daily 1/8/16  Yes German Waggoner MD   ARIPiprazole (ABILIFY) 15 MG tablet Take 15 mg by mouth daily    Yes Historical Provider, MD   lamoTRIgine (LAMICTAL) 200 MG tablet Take 200 mg by mouth 2 times daily    Yes Historical Provider, MD   INSULIN LISPRO SC Inject 10 Units into the skin 3 times daily (before meals)    Historical Provider, MD   nitroGLYCERIN (NITROSTAT) 0.4 MG SL tablet Place 0.4 mg under the tongue every 5 minutes as needed for Chest pain up to max of 3 total doses. If no relief after 1 dose, call 911. Historical Provider, MD   Blood Glucose Monitoring Suppl (FREESTYLE FREEDOM LITE) w/Device KIT Use to test glucose 4 times a day 11/17/20   Suzie Hearn MD   blood glucose test strips (FREESTYLE LITE) strip USE TO CHECK BLOOD GLUCOSE LEVELS FOUR TIMES DAILY 11/17/20   Mekhi Ratliff MD   Insulin Pen Needle (B-D ULTRAFINE III SHORT PEN) 31G X 8 MM MISC Five shots a day 9/22/20   TRAY Noriega CNP   INSULIN SYRINGE .5CC/29G 29G X 1/2\" 0.5 ML MISC 1 each by Does not apply route daily 12/3/19   TRAY Noriega CNP       Allergies:  Lisinopril    Social History:         TOBACCO:   reports that she has been smoking cigarettes.  She has a 15.00 pack-year smoking history. She has never used smokeless tobacco.  ETOH:   reports no history of alcohol use. Family History:               Problem Relation Age of Onset    Heart Disease Maternal Grandmother     Cancer Mother         lung and brain cancer    Cancer Maternal Aunt         lung and brain cancer       REVIEW OF SYSTEMS:   Pertinent positives as noted in the HPI. All other systems reviewed and negative. PHYSICAL EXAM PERFORMED:    BP 98/68   Pulse 94   Temp 96.6 °F (35.9 °C) (Oral)   Resp 18   Ht 5' 4.5\" (1.638 m)   Wt 244 lb (110.7 kg)   LMP 10/24/2012   SpO2 98%   BMI 41.24 kg/m²     General appearance:  No apparent distress, appears stated age and cooperative. HEENT:  Normal cephalic, atraumatic without obvious deformity. Pupils equal, round, and reactive to light. Extra ocular muscles intact. Conjunctivae/corneas clear. Neck: Supple, with full range of motion. No jugular venous distention. Trachea midline. Respiratory:  Normal respiratory effort. No use of accessory muscles or intercostal retractions. Equal excursion bilat. Cardiovascular:  Regular rate and rhythm    Abdomen: Soft, non-tender, non-distended with normal bowel sounds. Musculoskeletal:  No clubbing, cyanosis (+) edema bilaterally. Skin: Skin color, texture, turgor normal.  No rashes or lesions. Neurologic:    grossly non-focal.  Psychiatric:  Alert and oriented, thought content appropriate, normal insight  Capillary Refill: Brisk,< 3 seconds   Peripheral Pulses: +2 palpable, equal bilaterally       Labs:     Recent Labs     01/11/21  1540   WBC 7.3   HGB 13.1   HCT 40.3        Recent Labs     01/11/21  1540   *   K 5.0   CL 86*   CO2 33*   BUN 46*   CREATININE 2.8*   CALCIUM 9.5     Recent Labs     01/11/21  1540   AST 23   ALT 16   BILITOT 1.7*   ALKPHOS 267*     No results for input(s): INR in the last 72 hours.   Recent Labs     01/11/21  1540 01/11/21  2202   TROPONINI 0.02* 0.02* Urinalysis:      Lab Results   Component Value Date    NITRU Negative 01/11/2021    WBCUA 0-2 11/30/2020    BACTERIA 2+ 11/30/2020    RBCUA None seen 11/30/2020    BLOODU Negative 01/11/2021    SPECGRAV 1.010 01/11/2021    GLUCOSEU Negative 01/11/2021       Radiology:          XR CHEST (2 VW)   Final Result   Cardiomegaly. No convincing evidence for acute cardiopulmonary pathology. ASSESSMENT:    Active Hospital Problems    Diagnosis Date Noted    CAD (coronary artery disease) [I25.10]      Priority: High    Acute on chronic systolic CHF (congestive heart failure) ([facility]) [I50.23] 08/42/3238    Acute systolic CHF (congestive heart failure) ([facility]) [I50.21] 01/11/2021    CHF (congestive heart failure), NYHA class I, acute on chronic, combined ([facility] [I50.43] 01/11/2021    Ischemic heart disease [I25.9]     Morbid obesity with BMI of 40.0-44.9, adult ([facility] [E66.01, Z68.41] 08/05/2020    Bilateral lower extremity edema [R60.0] 06/22/2020    Bipolar disorder ([facility] [F31.9] 02/28/2020    SHAUNNA (acute kidney injury) ([facility] [N17.9] 09/29/2019    Hyponatremia [E87.1] 05/23/2018    GERD (gastroesophageal reflux disease) [K21.9]     Type 2 diabetes mellitus with diabetic polyneuropathy, with long-term current use of insulin ([facility] [E11.42, Z79.4] 12/10/2015    Essential hypertension [I10] 04/13/2011         PLAN:    1) CHF exac  - lasix IV diuresis    2) CAD  - ekg with non specific changes, trop chronically elevated with no chest pain    3) SHAUNNA  - Nephrology consulted    4) DM  - lantus with corrective ISS    DVT Prophylaxis: heparin sq  Diet: DIET LOW SODIUM 2 GM;  Code Status: Full eLxii Marsh MD    Thank you Tierra Buitrago PA-C for the opportunity to be involved in this patient's care. If you have any questions or concerns please feel free to contact me at [phone].

## 2021-01-12 NOTE — CONSULTS
Thank you to requesting provider: Dr. Carina Maxwell , for asking us to see [address] [place] [address]  Reason for consultation:  Acute Kidney Injury  Chief Complaint:  Leg swelling    History of Presenting Illness      61 y/o with history of cardiorenal syndrome. Multiple presentations for decompensated heart failure. Typically kidney function seems to respond well to diuresis. Baseline has been 2 to 2.2. Weight initially 244. She is on room air. Scr was up to 2.8 but has improved to 2.3. Swelling still worse than baseline and more on the right leg. Multiple chronic venous stasis ulcers. EF = 25%.       Past Medical/Surgical History      Active Ambulatory Problems     Diagnosis Date Noted    Type 2 diabetes mellitus with diabetic polyneuropathy, with long-term current use of insulin ([address] 12/10/2015    Essential hypertension 04/13/2011    CLINT (obstructive sleep apnea) 12/10/2015    Tobacco abuse disorder 12/10/2015    GERD (gastroesophageal reflux disease)     Anxiety and depression     Hyponatremia 05/23/2018    Iron deficiency anemia 05/23/2018    CAD (coronary artery disease)     Mitral valve regurgitation 05/24/2018    COPD (chronic obstructive pulmonary disease) ([address] 05/17/2019    SHAUNNA (acute kidney injury) ([address] 09/29/2019    Vitamin D deficiency 10/29/2019    Dyslipidemia     Abel's esophagus 04/29/2013    Acute on chronic congestive heart failure ([address] 02/28/2020    Viral hepatitis C 03/10/2010    Pulmonary nodule 02/28/2020    Class 2 severe obesity due to excess calories with serious comorbidity and body mass index (BMI) of 39.0 to 39.9 in adult ([address] 02/28/2020    Bipolar disorder ([address] 02/28/2020    Pulmonary hypertension ([address]     Bilateral lower extremity edema 06/22/2020    Habitual self-excoriation 06/22/2020    Ulcer of left lower leg, limited to breakdown of skin ([address] 06/22/2020    Ulcer of right leg, limited to breakdown of skin ([address] 06/22/2020    Arthritis     foot associated with type 2 diabetes mellitus ([address] 01/18/2020    Diabetic ulcer of left foot associated with type 2 diabetes mellitus, limited to breakdown of skin ([address] 01/18/2020    Hypoglycemia associated with type 2 diabetes mellitus ([address] 01/18/2020    Unstable angina pectoris ([address]     Cardiomyopathy, ischemic     Acute anxiety 01/26/2020    Acute on chronic systolic heart failure ([address] 02/21/2020    Hypokalemia 02/28/2020    S/P coronary artery bypass graft x 1 02/28/2020    Positive FIT (fecal immunochemical test) 02/28/2020    History of arterial bypass of lower extremity 02/28/2020    Alcohol dependence ([address] 03/10/2010    Intractable nausea and vomiting 02/28/2020    Intractable vomiting     Dehydration     CHF (congestive heart failure), NYHA class I, acute on chronic, combined ([address] 03/20/2020    Hypotension - chronic     Left foot pain 04/27/2020    Cellulitis 06/03/2020    Stasis dermatitis of both legs 06/22/2020    Acute on chronic renal failure (HCC) 06/29/2020    Hyperkalemia 06/29/2020    Acute on chronic renal insufficiency     Venous stasis ulcer of right calf with fat layer exposed without varicose veins (HCC) 07/02/2020    Venous stasis ulcer of left calf with fat layer exposed without varicose veins ([address] 07/02/2020    Diabetic polyneuropathy associated with type 2 diabetes mellitus ([address] 07/02/2020    Left renal artery stenosis (HCC)     Traumatic perinephric hematoma, left, initial encounter 08/04/2020    Aortocoronary bypass status 08/04/2020    Acute kidney injury superimposed on CKD ([address] 08/05/2020    Leukocytosis 08/05/2020    Acute blood loss anemia 08/05/2020    Multiple wounds of skin 08/05/2020    Right upper quadrant pain 08/18/2020    Acute on chronic right-sided heart failure (HCC)     Stenosis of left subclavian artery ([address]     Diabetic ulcer of right foot associated with type 2 diabetes mellitus, limited to breakdown of skin ([address] 10/17/2020    Cough      Past Medical History:   Diagnosis Date    Asthma     MI (myocardial infarction) ([facility] 10/07/2019         Review of Systems     Constitutional:  No weight loss, no fever/chills  Eyes:  No eye pain, no eye redness  Cardiovascular:  No chest pain, + edema  Respiratory:  No hemoptysis, + SOB  Gastrointestinal:  No blood in stool, no n/v, no diarrhea  Genitoruinary:  No hematuria, no difficulty with urination  Musculoskeletal:  No joint swelling, no redness  Integumentary:  No Rash, no itching, + chronic venous stasis ulcers   Neurological:  No focal weakness, No new sensory deficit  Psychiatric:  No depression, no confusion  Endocrine:  No polyuria, no polydipsia       Medications      Reviewed in EMR     Allergies     Lisinopril      Family History       Negative for Kidney Disease    Social History      Social History     Socioeconomic History    Marital status: Single     Spouse name: None    Number of children: None    Years of education: None    Highest education level: None   Occupational History    None   Social Needs    Financial resource strain: None    Food insecurity     Worry: None     Inability: None    Transportation needs     Medical: None     Non-medical: None   Tobacco Use    Smoking status: Current Every Day Smoker     Packs/day: 0.50     Years: 30.00     Pack years: 15.00     Types: Cigarettes    Smokeless tobacco: Never Used   Substance and Sexual Activity    Alcohol use: No     Comment: quit 2006     Drug use: Never    Sexual activity: Yes     Partners: Male   Lifestyle    Physical activity     Days per week: None     Minutes per session: None    Stress: None   Relationships    Social connections     Talks on phone: None     Gets together: None     Attends [employer] service: None     Active member of club or organization: None     Attends meetings of clubs or organizations: None     Relationship status: None    Intimate partner violence     Fear of current or ex partner: None     Emotionally abused: None     Physically abused: None     Forced sexual activity: None   Other Topics Concern    None   Social History Narrative    None       Physical Exam     Blood pressure 98/70, pulse 89, temperature 96.7 °F (35.9 °C), temperature source Oral, resp. rate 16, height 5' 4\" (1.626 m), weight 237 lb 11.2 oz (107.8 kg), last menstrual period 10/24/2012, SpO2 98 %, not currently breastfeeding. General:  NAD, A+Ox3  HEENT:  PERRL, EOMI  Neck:  Supple, normal range of movement  Chest:  CTAB, good respiratory effort, good air movement  CV:  Regular, no rub   Abdomen:  NTND, soft, +BS, no hepatosplenomegaly  Extremities:  ++ peripheral edema, R > L  Neurological:  Moving all four extremities, CN II-XII grossly intact  Lymphatics:  No palpable lymph nodes  Skin:  No rash, no jaundice  Psychiatric:  Normal insight and judgement, good recall    Data     Recent Labs     01/11/21  1540 01/12/21  0123   WBC 7.3 7.0   HGB 13.1 12.3   HCT 40.3 37.4   MCV 91.1 92.0    247     Recent Labs     01/11/21  1540 01/12/21  0123   * 134*   K 5.0 4.3   CL 86* 93*   CO2 33* 28   GLUCOSE 265* 219*   MG  --  2.00   BUN 46* 47*   CREATININE 2.8* 2.3*   LABGLOM 17* 22*   GFRAA 21* 26*       Assessment:    Acute Kidney Injury:  KDIGO stage 1  - Etiology:  Cardiorenal syndrome with decompensated heart failure    Chronic Kidney Disease:  Stage IV  - No proteinuria. Multiple episodes of SHAUNNA  - Highly variable depending on cardiac and volume status. Scr was 1.1 on 12/22?     Chronic Hypotension:  Impacts pressure natriuresis     Hyponatremia:  Hypervolemic / Due to CHF    Systolic Heart Failure:  EF = 25%   Worsening edema but on room air    Plan:    IV lasix   IV albumin  Might need midodrine  Metolazone tomorrow to augment diuresis  Follow labs with diuresis, usually she improves     Thank you for asking us to participate in the management of your patient, please do not hesitate to

## 2021-01-13 NOTE — CONSULTS
palpitations. All other systems are negative except as present illness. PHYSICAL EXAMINATION:  GENERAL:  A well-developed, well-nourished while female in no acute  distress. VITAL SIGNS:  Blood pressure is 111/71, heart rate 96, respirations 16,  temperature 97. She is 96% saturating on room air. HEENT:  Normocephalic and atraumatic. Oropharynx is clear. Moist  mucous membranes. NECK:  Supple. CHEST:  Basilar rales. CARDIOVASCULAR:  Regular S1 and S2. There is no S3 or S4 gallop. There  is no significant murmur. Jugular venous pressure is mildly elevated. ABDOMEN:  Soft, nontender. Positive bowel sounds. EXTREMITIES:  Edematous. NEUROLOGIC:  Grossly nonfocal.  SKIN:  Warm and dry. PSYCHIATRIC:  Affect calm. LABORATORY DATA:  Significant laboratory data includes troponin of 0.02. ProBNP of 3512. Chest x-ray shows cardiomegaly. EKG, sinus rhythm. Left axis deviation. Right bundle-branch block. ST-segment  abnormalities. IMPRESSION:  1. Shortness of breath due to pulmonary edema. 2.  Acute-on-chronic systolic congestive heart failure. 3.  Elevated troponin due to supply demand imbalance chronic kidney  disease. 4.  Acute-on-chronic kidney disease. 5.  Edema chronic, worse. 6.  Abnormal EKG. 7.  Cough. 8.  Coronary artery disease, see above for details. 9.  Ischemic cardiomyopathy, ejection fraction of 25%. 10.  COPD. 11.  Diabetes. 12.  Hyperlipidemia. 13.  Hypertension. 14.  Peripheral arterial disease including subclavian stenosis. 15.  Sleep apnea. RECOMMENDATIONS:  1. Increase diuresis with IV Lasix. 2.  Monitor labs daily. 3.  No ACE inhibitor or angiotensin receptor blocker at this time due to  acute-on-chronic kidney disease. We will reconsider once stabilizes. 4.  Consider angioplasty of left subclavian stenosis as it is likely  compromising blood flow to her left mammary graft and may be subsequently causing ischemia of the anterior myocardium. 5. Smoking cessation discussed.         Amy Wong MD    D: 01/12/2021 15:39:33       T: 01/12/2021 19:01:02     /[email or web address]  Job#: [id number]     Doc#: [id number]    CC:

## 2021-01-13 NOTE — PROGRESS NOTES
Patient's EF (Ejection Fraction) is less than 40%    Patient's weights and intake/output reviewed:    Patient's Last Weight: 237 lbs 14.4 oz lbs obtained by standing scale. Difference of 3.2 oz more than last documented weight. Intake/Output Summary (Last 24 hours) at 1/13/2021 1512  Last data filed at 1/13/2021 1412  Gross per 24 hour   Intake 1040 ml   Output 2450 ml   Net -1410 ml         Pt is currently is on no oxygen. Pt denies shortness of breath. Pt with nonpitting lower extremity edema. Patient and/or Family's stated Goal of Care this Admission: reduce shortness of breath, increase activity tolerance, better understand heart failure and disease management, be more comfortable and reduce lower extremity edema prior to discharge      Comorbidities Reviewed Yes  Patient has a past medical history of Acute blood loss anemia, Acute kidney injury ([address] Acute kidney injury superimposed on CKD ([address] Acute on chronic combined systolic and diastolic congestive heart failure ([address] Acute on chronic right-sided heart failure ([address] Alcohol dependence ([address] Arthritis, Asthma, CAD (coronary artery disease), Cellulitis, COPD (chronic obstructive pulmonary disease) ([address] Diabetic ulcer of left foot associated with type 2 diabetes mellitus, limited to breakdown of skin ([address] Diabetic ulcer of toe of right foot associated with type 2 diabetes mellitus ([address] Left renal artery stenosis ([address] MI (myocardial infarction) ([address] Positive FIT (fecal immunochemical test), 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          >>For CHF and Comorbidity documentation on Education Time and Topics, please see Education Tab

## 2021-01-13 NOTE — PROGRESS NOTES
CARDIOLOGY PROGRESS NOTE      Patient Name: Jose Raul Snow  Date of admission: 1/11/2021  3:13 PM  Admission Dx: Acute systolic CHF (congestive heart failure) ([facility]) [I50.21]  CHF (congestive heart failure), NYHA class I, acute on chronic, combined ([facility] [address] [I50.43]  Reason for Consult:  CHF  Requesting Physician: Lilibeth James MD  Primary Care physician: Oly Nichols PA-C    Subjective:   Ms. Suzanne Andrade is a 73-year-old female with prior history of coronary artery disease status post bypass 1/2020 and prior PCI 2018, ischemic cardiomyopathy with severe LV dysfunction 8/2020, status post cardiomems, tobacco abuse, chronic kidney disease, peripheral artery disease with left subclavian stenosis compromising LIMA-LAD graft flow with clear coronary steal by prior [facility] 8/27/20 Status post L subclavian angio/stent 9/1/2020 with Dr. Duy Robison with restored antegrade flow to LIMA graft , obstructive sleep apnea, obesity, chronic obstructive pulmonary disease, and severe pulmonary hypertension by prior cath, who presented to the emergency room 1/11/21 with complaints of increased swelling, weight gain, and shortness of breath.      Patient first evaluated 1/12/21 with Dr. Tenisha Foreman. CXR showed cardiomegaly. IV lasix initiated. Course complicated with SHAUNNA on CKD. 2.8 at admission from 1.3-1.6 baseline. R calf pain going for VL doppler LE today. Today, she states she is doing better. Breathing is better. No CP, palpitations or dizziness. Persistent LE edema with LE wrapped. No other complaints. Home Medications:  Were reviewed and are listed in nursing record and/or below  Prior to Admission medications    Medication Sig Start Date End Date Taking?  Authorizing Provider   carvedilol (COREG) 3.125 MG tablet TAKE 1 TABLET BY MOUTH TWICE A DAY WITH MEALS 1/11/21  Yes TRAY Quiroz CNP   atorvastatin (LIPITOR) 80 MG tablet TAKE 1 TABLET BY MOUTH EVERY DAY AT NIGHT 1/11/21  Yes Romayne Presser L Genie He - CNP   doxepin (SINEQUAN) 25 MG capsule TAKE 1 CAPSULE BY MOUTH EVERY DAY AT NIGHT 12/20/20  Yes Historical Provider, MD   KLOR-CON M20 20 MEQ extended release tablet TAKE 2 TABLETS BY MOUTH 4 TIMES DAILY 1/4/21  Yes TRAY Teresa CNP   methocarbamol (ROBAXIN) 500 MG tablet Take 500 mg by mouth 2 times daily   Yes Historical Provider, MD   midodrine (PROAMATINE) 5 MG tablet Take 5 mg by mouth 2 times daily   Yes Historical Provider, MD   spironolactone (ALDACTONE) 50 MG tablet Take 50 mg by mouth daily   Yes Historical Provider, MD   torsemide (DEMADEX) 20 MG tablet Take 50 mg by mouth daily    Yes Historical Provider, MD   metOLazone (ZAROXOLYN) 2.5 MG tablet Take 2.5 mg by mouth three times a week On Mon Weds and Friday   Yes Historical Provider, MD   tiotropium (SPIRIVA RESPIMAT) 2.5 MCG/ACT AERS inhaler INHALE 2 PUFFS INTO THE LUNGS DAILY 10/13/20  Yes Opal Price MD   Insulin Degludec (TRESIBA FLEXTOUCH) 100 UNIT/ML SOPN Inject 30 Units into the skin nightly 9/22/20  Yes TRAY Loco CNP   traZODone (DESYREL) 100 MG tablet Take 100 mg by mouth nightly as needed for Sleep Can take 1 or 2 tabs nightly as directed   Yes Historical Provider, MD   albuterol sulfate  (90 Base) MCG/ACT inhaler Inhale 2 puffs into the lungs every 6 hours as needed for Wheezing or Shortness of Breath 7/3/20  Yes Mitchell Mckoy MD   buprenorphine-naloxone (SUBOXONE) 8-2 MG FILM SL film Place 1 Film under the tongue 2 times daily.    Yes Historical Provider, MD   benztropine (COGENTIN) 1 MG tablet Take 1 mg by mouth nightly  12/13/19  Yes Historical Provider, MD   pantoprazole (PROTONIX) 40 MG tablet Take 1 tablet by mouth 2 times daily 10/1/19  Yes Hung Nguyen MD   clopidogrel (PLAVIX) 75 MG tablet TAKE 1 TABLET BY MOUTH EVERY DAY 9/3/19  Yes TRAY Teresa CNP   magnesium oxide (MAG-OX) 400 (240 Mg) MG tablet TAKE 1 TABLET ONCE DAILY 5/1/19  Yes Brian Oneill MD   busPIRone (BUSPAR) 30 MG tablet Take 30 mg by mouth 2 times daily  4/2/18  Yes Historical Provider, MD   aspirin EC 81 MG EC tablet Take 1 tablet by mouth daily 1/8/16  Yes Angella Duran MD   ARIPiprazole (ABILIFY) 15 MG tablet Take 15 mg by mouth daily    Yes Historical Provider, MD   lamoTRIgine (LAMICTAL) 200 MG tablet Take 200 mg by mouth 2 times daily    Yes Historical Provider, MD   INSULIN LISPRO SC Inject 10 Units into the skin 3 times daily (before meals)    Historical Provider, MD   nitroGLYCERIN (NITROSTAT) 0.4 MG SL tablet Place 0.4 mg under the tongue every 5 minutes as needed for Chest pain up to max of 3 total doses. If no relief after 1 dose, call 911.     Historical Provider, MD   Blood Glucose Monitoring Suppl (FREESTYLE FREEDOM LITE) w/Device KIT Use to test glucose 4 times a day 11/17/20   Kinga Bocanegra MD   blood glucose test strips (FREESTYLE LITE) strip USE TO CHECK BLOOD GLUCOSE LEVELS FOUR TIMES DAILY 11/17/20   Kinga Bocanegra MD   Insulin Pen Needle (B-D ULTRAFINE III SHORT PEN) 31G X 8 MM MISC Five shots a day 9/22/20   TRAY Cohen CNP   INSULIN SYRINGE .5CC/29G 29G X 1/2\" 0.5 ML MISC 1 each by Does not apply route daily 12/3/19   TRAY Cohen CNP        CURRENT Medications:      oxyCODONE (ROXICODONE) immediate release tablet 5 mg, Q8H PRN      albumin human 25 % IV solution 25 g, Q6H      metOLazone (ZAROXOLYN) tablet 5 mg, Daily      lamoTRIgine (LAMICTAL) tablet 200 mg, BID      ARIPiprazole (ABILIFY) tablet 15 mg, Daily      aspirin EC tablet 81 mg, Daily      busPIRone (BUSPAR) tablet 30 mg, BID      magnesium oxide (MAG-OX) tablet 400 mg, Daily      clopidogrel (PLAVIX) tablet 75 mg, Daily      pantoprazole (PROTONIX) tablet 40 mg, BID      benztropine (COGENTIN) tablet 1 mg, Nightly      buprenorphine-naloxone (SUBOXONE) 8-2 MG SL film 1 Film, BID      traZODone (DESYREL) tablet 100 mg, Nightly PRN      insulin glargine (LANTUS) injection vial 30 Units, Nightly      tiotropium (SPIRIVA RESPIMAT) 2.5 MCG/ACT inhaler 2 puff, Daily      insulin lispro (HUMALOG) injection vial 10 Units, TID AC      methocarbamol (ROBAXIN) tablet 500 mg, BID      midodrine (PROAMATINE) tablet 5 mg, BID      nitroGLYCERIN (NITROSTAT) SL tablet 0.4 mg, Q5 Min PRN      carvedilol (COREG) tablet 3.125 mg, BID WC      atorvastatin (LIPITOR) tablet 80 mg, Daily      sodium chloride flush 0.9 % injection 10 mL, 2 times per day      sodium chloride flush 0.9 % injection 10 mL, PRN      promethazine (PHENERGAN) tablet 12.5 mg, Q6H PRN      polyethylene glycol (GLYCOLAX) packet 17 g, Daily PRN      acetaminophen (TYLENOL) tablet 650 mg, Q6H PRN    Or      acetaminophen (TYLENOL) suppository 650 mg, Q6H PRN      insulin lispro (HUMALOG) injection vial 0-12 Units, TID WC      insulin lispro (HUMALOG) injection vial 0-6 Units, Nightly      ipratropium-albuterol (DUONEB) nebulizer solution 1 ampule, Q4H PRN      heparin (porcine) injection 5,000 Units, 3 times per day        Allergies:  Lisinopril     Review of Systems:   A 14 point review of symptoms completed. Pertinent positives identified in the HPI, all other review of symptoms negative. Objective:     Vitals:    01/12/21 1943 01/13/21 0128 01/13/21 0210 01/13/21 0723   BP: 90/65  102/63    Pulse: 93  92    Resp: 16  16    Temp: 96.9 °F (36.1 °C)  97 °F (36.1 °C)    TempSrc: Axillary  Oral    SpO2: 94%  94% 96%   Weight:  237 lb 14.4 oz (107.9 kg)     Height:          Weight: 237 lb 14.4 oz (107.9 kg)       PHYSICAL EXAM:    General:  Alert, cooperative, no distress, appears stated age   Head:  Normocephalic, atraumatic   Eyes:  Conjunctiva/corneas clear, anicteric sclerae    Nose: Nares normal, no drainage or sinus tenderness   Throat: No abnormalities of the lips, oral mucosa or tongue. Neck: Trachea midline.  Neck supple with no lymphadenopathy, thyroid not enlarged, symmetric, no tenderness/mass/nodules, mild JVP Lungs:   Clear to auscultation bilaterally, no wheezes, no rales,  Diminished R base, stable on 2L O2   Chest Wall:  No deformity or tenderness to palpation   Heart:  Regular rate and rhythm, normal S1, normal S2, no murmur, no rub, no S3/S4, PMI non-palpable. Abdomen:   Soft, non-tender, with normoactive bowel sounds. No masses, no hepatosplenomegaly   Extremities: No cyanosis, clubbing. Tense edema LE. Vascular: 2+ radial, 1+ but palpable dorsalis pedis and posterior tibial pulses bilaterally. Purple hue to feet bilaterally that are cool to touch. Brisk carotid upstrokes without carotid bruit. Evidence of skins sores all extremities from scratching. LE wrapped 1/13. Skin: Skin color, texture, turgor are normal with no rashes or ulceration. Pysch: Euthymic mood, appropriate affect   Neurologic: Oriented to person, place and time. No slurred speech or facial asymmetry. No motor or sensory deficits on gross examination. Labs:   CBC:   Lab Results   Component Value Date    WBC 7.0 01/12/2021    RBC 4.07 01/12/2021    HGB 12.3 01/12/2021    HCT 37.4 01/12/2021    MCV 92.0 01/12/2021    RDW 17.1 01/12/2021     01/12/2021     CMP:  Lab Results   Component Value Date     01/13/2021    K 4.2 01/13/2021    K 5.0 01/11/2021    CL 91 01/13/2021    CO2 29 01/13/2021    BUN 49 01/13/2021    CREATININE 2.2 01/13/2021    GFRAA 28 01/13/2021    AGRATIO 1.0 01/11/2021    LABGLOM 23 01/13/2021    GLUCOSE 161 01/13/2021    PROT 6.3 01/12/2021    CALCIUM 9.9 01/13/2021    BILITOT 1.2 01/12/2021    ALKPHOS 225 01/12/2021    AST 18 01/12/2021    ALT 12 01/12/2021     PT/INR:  No results found for: PTINR  HgBA1c:  Lab Results   Component Value Date    LABA1C 11.5 12/21/2020     Lab Results   Component Value Date    TROPONINI 0.02 (H) 01/12/2021         Interval Testing/Data:     EKGs personally reviewed. Telemetry reviewed, NSR 90's.      VL doppler LE today  Summary        Within the limits of this exam, there is no evidence of deep or superficial    venous thrombosis in the lower extremities bilaterally.    Occluded arterial stent involving the ostial/proximal segment of the left    superficial femoral artery with monophasic dampened flow returning just    beyond the stent at the proximal segment of the thigh. [facility] 9/3/2020    HEMODYNAMICS     CHAMBER PRESSURE SATURATION   RA  15  54 %   RV  62/16     PA  69/20  51 %   PW  19 with V waves to 25     AORTA  118/63  94 %      HERMILA CARDIAC OUTPUT  5.4 L/min   SVR  1075 L/min   PVR  343 L/min      Left subclavian angiogram shows proximal 95 to 99% stenosis. There appears to be retrograde filling of the left subclavian artery by way of the LIMA and there appears to be coronary steal.  The vertebral arteries not well visualized. Left heart catheterization     LVEDP  22   GRADIENT ACROSS AORTIC VALVE  none         CORONARY ARTERIES     LM  Proximal less than 10% stenosis, mid 10 to 20% stenosis, distal 70% stenosis.     Overall similar appearance to prior angiograms         LAD  Ostial 70% stenosis, the LAD is extensively stented in the proximal and mid segments, the stents appear to be widely patent with 20% in-stent restenosis, distal vessel has less than 10% stenosis. There is retrograde filling into the LIMA to LAD graft that fills the left subclavian artery.     D1 is a small vessel with ostial 90% stenosis.     Overall similar appearance to prior angiograms         LCX  Circumflex is stented throughout the proximal and mid and distal segments into OM 1 which is a large vessel. Stents are widely patent with 10 to 20% in-stent restenosis. The stents jailed the AV groove circumflex and at the mid AV groove circumflex, there is a 70 to 80% stenosis.             RCA Dominant, medium vessel, heavily calcified, proximal-mid 60 to 70% stenosis, distal less than 10% stenosis, overall similar angiographically as compared to prior angiograms.            BYPASS GRAFTS     LIMA-LAD  Visualized nonselectively through retrograde filling from the native LAD, it is not visualized with antegrade flow from the left subclavian artery injection it appears to be widely patent with less than 10% [id number]-[id number]-[id number] stenosis.          CONCLUSIONS:      Elevated filling pressures, continue diuresis  CardioMEMS had good correlation with the right heart catheterization numbers. Severe pulmonary hypertension  Patent LIMA to LAD graft however there appears to be coronary steal phenomenon due to severe left subclavian stenosis  We will consider left subclavian intervention  Will obtain follow-up vascular ultrasound to assess this  We will consult with CT surgery regarding therapeutic options    Stress 8/22/20  Conclusions        Summary    Calculated LVEF is not reliable on study which is technically difficult,    estimate of 51% is likely an overestimate    Mid-distal anteroseptal/apical akinesis    Large fixed defects in anteroseptal/apical walls    Mild to moderate reversibility in lateral walls consistent with ischemia       Echo 8/19/20  Conclusions      Summary   Limited only f/u for LVEF and mitral regurgitation. Technically difficult examination. The left ventricular systolic function is severely reduced with an ejection   fraction of 25%. There is hypokinesis of the apex, apical lateral, apical septum,   anterioseptum and anterior walls. Compared to previous study from 7-1-2020 no changes noted in left   ventricular function. Moderate mitral regurgitation.     Impression and Plan   Ms. Rody Galindo is a 78-year-old female with prior history of coronary artery disease status post bypass 1/2020 and prior PCI 2018, ischemic cardiomyopathy with severe LV dysfunction 8/2020, status post cardiomems, tobacco abuse, chronic kidney disease, peripheral artery disease with left subclavian stenosis compromising LIMA-LAD graft flow with clear coronary steal by prior [facility] 8/27/20 Status post L subclavian angio/stent 9/1/2020, obstructive sleep apnea, obesity, chronic obstructive pulmonary disease, and severe pulmonary hypertension by prior cath, who presented to the emergency room 1/11/21 with complaints of increased swelling, weight gain, and shortness of breath. 1.  Shortness of breath due to pulmonary edema. 2.  Acute-on-chronic systolic congestive heart failure. 3.  Elevated troponin due to supply demand imbalance chronic kidney  disease. 4.  Acute-on-chronic kidney disease. 5.  Coronary artery disease with details above  6. Ischemic cardiomyopathy, ejection fraction of 25%. 7.   COPD. 8.   Diabetes. 9.   Hyperlipidemia. 10.  Hypertension. 11.  Peripheral arterial disease including subclavian stenosis status post stent, status post renal stents as well as LE stents with evidence of SFA stent occlusion LLE by today's doppler study.         Patient Active Problem List   Diagnosis    Type 2 diabetes mellitus with diabetic polyneuropathy, with long-term current use of insulin ([address]    Essential hypertension    CLINT (obstructive sleep apnea)    Tobacco abuse disorder    GERD (gastroesophageal reflux disease)    Anxiety and depression    Hyponatremia    Iron deficiency anemia    CAD (coronary artery disease)    Mitral valve regurgitation    COPD (chronic obstructive pulmonary disease) ([address]    SHAUNNA (acute kidney injury) ([address]    Vitamin D deficiency    Dyslipidemia    Abel's esophagus    Acute on chronic congestive heart failure (HCC)    Viral hepatitis C    Pulmonary nodule    Class 2 severe obesity due to excess calories with serious comorbidity and body mass index (BMI) of 39.0 to 39.9 in adult ([address]    Bipolar disorder ([address]    Pulmonary hypertension (HCC)    Bilateral lower extremity edema    Habitual self-excoriation    Ulcer of left lower leg, limited to breakdown of skin (HCC)    Ulcer of right leg, limited to breakdown of skin ([address]    Arthritis    Cardiomyopathy ([facility]    Chronic systolic heart failure (HCC)    Peripheral venous insufficiency    Atherosclerosis of native artery of both lower extremities with bilateral ulceration of calves (HCC)    Acute kidney injury superimposed on CKD ([facility] [address]    Morbid obesity with BMI of 40.0-44.9, adult (HCC)    CKD (chronic kidney disease) stage 4, GFR 15-29 ml/min (HCC)    Hypokalemia    Dyspnea on exertion    Ischemic heart disease    Moderate protein-calorie malnutrition (HCC)    DKA, type 2, not at goal [facility])    Acute on chronic systolic CHF (congestive heart failure) (HCC)    Acute systolic CHF (congestive heart failure) (HCC)    CHF (congestive heart failure), NYHA class I, acute on chronic, combined ([facility]       PLAN:  1. Continue diuresis in conjunction with nephrology  2. Continue dual anti-platelet therapy indefinitely   3. Continue low dose carvedilol; no ACEI or aldactone given renal function  4. Continue nightly statin      I will address the patient's cardiac risk factors and adjusted pharmacologic treatment as needed. In addition, I have reinforced the need for patient directed risk factor modification. All questions and concerns were addressed to the patient/family. Alternatives to my treatment were discussed. Thank you for allowing us to participate in the care of [address] [place] [address]. Please call me with any questions [phone].     Liban Castaneda MD   Cardiovascular Disease  [facility]  [phone] [facility]  [phone] [address] [place]  1/13/2021 8:20 AM

## 2021-01-13 NOTE — PROGRESS NOTES
Progress Note    HISTORY     CC:  Swelling           We are following for acute kidney injury      Subjective/   HPI:  Scr is 2.2. Room air but still very swollen. BP stable and got metolazone this morning.       ROS:  Constitutional:  No fevers, No Chills, + weakness  Cardiovascular:  No palpations, + edema  Respiratory:  No wheezing, no cough  Skin:  No rash, no itching  :  No hematuria, no dysuria     Social Hx:  No family at bedside       Past Medical and Surgical History:  - Reviewed, no changes     EXAM       Objective/     Vitals:    01/13/21 0210 01/13/21 0723 01/13/21 0845 01/13/21 1330   BP: 102/63  108/74 103/72   Pulse: 92  92 90   Resp: 16  18 18   Temp: 97 °F (36.1 °C)  97 °F (36.1 °C) 97 °F (36.1 °C)   TempSrc: Oral  Oral Oral   SpO2: 94% 96% 97% 95%   Weight:       Height:         24HR INTAKE/OUTPUT:      Intake/Output Summary (Last 24 hours) at 1/13/2021 1801  Last data filed at 1/13/2021 1736  Gross per 24 hour   Intake 1080 ml   Output 2750 ml   Net -1670 ml     Constitutional:  Alert, awake, no apparent distress  Eyes:  Pupils reactive, sclera clear   Neck:  Normal thyroid, no masses   Cardiovascular:  Regular, no rub  Respiratory:  No distress, no wheezing  Psychiatry:  Appropriate mood/affect, alert  Abdomen: +bs, soft, nt, no masses   Musculoskeletal: ++ LE edema, no clubbing   Lymphatics:  No LAD in neck, no supraclavicular nodes       MEDICAL DECISION MAKING       Data/  Recent Labs     01/11/21  1540 01/12/21  0123   WBC 7.3 7.0   HGB 13.1 12.3   HCT 40.3 37.4   MCV 91.1 92.0    247     Recent Labs     01/11/21  1540 01/12/21  0123 01/13/21  0309   * 134* 133*   K 5.0 4.3 4.2   CL 86* 93* 91*   CO2 33* 28 29   GLUCOSE 265* 219* 161*   PHOS  --   --  4.4   MG  --  2.00 2.40   BUN 46* 47* 49*   CREATININE 2.8* 2.3* 2.2*   LABGLOM 17* 22* 23*   GFRAA 21* 26* 28*       Assessment/     Acute Kidney Injury:  KDIGO stage 1  - Etiology:  Cardiorenal syndrome with decompensated heart failure. Improving and tolerating diuresis well      Chronic Kidney Disease:  Stage IV  - No proteinuria. Multiple episodes of SHAUNNA  - Highly variable depending on cardiac and volume status.   Scr was 1.1 on 12/22?     Chronic Hypotension:  Impacts pressure natriuresis      Hyponatremia:  Hypervolemic / Due to CHF     Systolic Heart Failure:  EF = 25%              Worsening edema but on room air    Plan/     Metolazone to augment diuresis  IV lasix 80 mg BID  Follow in/outs  Follow labs    -----------------------------  River Chance M.D.   [facility]

## 2021-01-13 NOTE — PROGRESS NOTES
Bedside report given to Cristobal Joiner, [address] [facility].

## 2021-01-13 NOTE — CONSULTS
[address] [address] Continence Nurse  Consult Note       NAME:  Kristan Bautista  MEDICAL RECORD NUMBER:  [id number]  AGE: 60 y.o. GENDER: female  : 1965  TODAY'S DATE:  2021    Subjective   Reason for WOCN Evaluation and Assessment: BLE, chest      Sofia Reason is a 60 y.o. female referred by:   [x] Physician  [] Nursing  [] Other:     Pt seen for multiple areas of skin breakdown to her BLE, bilateral arms, and chest.  Pt has been to the [address] in the past string in 2020 and went sporadically. She states she stopped going because of money, transportation, and she says she can do the same treatment at home. Pt has multiple open wounds to her BLE that she said started when her legs swelled with fluid. She  has multiple open areas to her  BLE, BUE, and chest in various stages of healing. She states she picks at her skin and her dog scratches her legs. No excoriations noted. She complains of pain in her legs when they swell, denies pain in any other areas where there are wounds. Per staff RN, pt is in Acute CHF with a 26 lb fluid weight gain upon admission.         Patient Goal of Care:  [x] Wound Healing  [] Odor Control  [] Palliative Care  [x] Pain Control   [] Other:         PAST MEDICAL HISTORY        Diagnosis Date    Acute blood loss anemia 2020    Acute kidney injury ([address] 2019    Acute kidney injury superimposed on CKD ([address] 2020    Acute on chronic combined systolic and diastolic congestive heart failure ([address] 1/10/2020    Acute on chronic right-sided heart failure ([address] 2020    Alcohol dependence ([address] 3/10/2010    Arthritis     Asthma     CAD (coronary artery disease)     Cellulitis 6/3/2020    COPD (chronic obstructive pulmonary disease) (HCC)     Diabetic ulcer of left foot associated with type 2 diabetes mellitus, limited to breakdown of skin ([address] 2020    Diabetic ulcer of toe of right foot associated with type 2 diabetes mellitus ([facility] 1/18/2020    Left renal artery stenosis ([facility] 08/2020    MI (myocardial infarction) ([facility] 10/07/2019    NSTEMI    Positive FIT (fecal immunochemical test) 2/28/2020    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 steal syndrome 09/01/2020    Traumatic perinephric hematoma, left, initial encounter 8/4/2020       PAST SURGICAL HISTORY    Past Surgical History:   Procedure Laterality Date    ARTERIAL BYPASS SURGRY Left 01/06/2016    Axillary/Subclavian to Brachial Bypass w/reversed SVG    CARDIAC CATHETERIZATION  03/27/2018    Dr. Schmidt Preston - at [address]  01/20/2020    Dr. Lavon Baptiste      pancreatitis post surgery    CORONARY ANGIOPLASTY WITH STENT PLACEMENT  03/16/2018    MELODY- 3.5 x 38 and 3.5 x 20 to Cx    CORONARY ANGIOPLASTY WITH STENT PLACEMENT  01/03/2018    MELODY- 3.0 x 38 and 2.75 x 26 and 2.75 x 8 and 2.75 x 22 to the LAD    CORONARY ANGIOPLASTY WITH STENT PLACEMENT  10/07/2019    MELODY- 2.5 x 8 to [address] GRAFT N/A 1/27/2020    CORONARY ARTERY BYPASS GRAFTING X 1, ON PUMP BEATING HEART, INTERNAL MAMMARY ARTERY TO LEFT ANTERIOR DESCENDING ARTERY, VAS CATH INSERTION, URI, PLATELET GEL APPLICATION, 5 LEVEL BILATERAL INTERCOSTAL NERVE BLOCK, STERNAL PLATING performed by Dee Dee Ashby MD at [address] Right 5/16/2019    fem-pop, performed by Erickson Jacobo MD at [address]  02/14/2019    CardioMEMs insertion for CHF    ROTATOR CUFF REPAIR Left 04/22/2016    TONSILLECTOMY      TRANSESOPHAGEAL ECHOCARDIOGRAM  01/26/2020    TRANSLUMINAL ANGIOPLASTY Left 10/08/2019    with stenting, at [facility]    [address] Left 04/29/2020    of the [facility] re-occlusion    TUMOR EXCISION      benign tumors X 2 chest & axilla    UPPER GASTROINTESTINAL ENDOSCOPY  4/25/2013    BX barretts, HH, gastritis    UPPER GASTROINTESTINAL ENDOSCOPY  12/10/2015    UPPER GASTROINTESTINAL ENDOSCOPY  01/06/2017    Gastritis    VASCULAR SURGERY Left 12/08/2015    upper extremity and mesenteric angiogram (diagnostic only)    VASCULAR SURGERY Bilateral 07/07/2020    diagnostic lower extremity angiogram only    VASCULAR SURGERY Left 08/04/2020    angioplasty and stent of renal artery    VASCULAR SURGERY Left 08/05/2020    coil embolization of branch renal artery vessel for bleeding    VASCULAR SURGERY Left 09/01/2020    angioplasty and stenting of subclavian artery       FAMILY HISTORY    Family History   Problem Relation Age of Onset    Heart Disease Maternal Grandmother     Cancer Mother         lung and brain cancer    Cancer Maternal Aunt         lung and brain cancer       SOCIAL HISTORY    Social History     Tobacco Use    Smoking status: Current Every Day Smoker     Packs/day: 0.50     Years: 30.00     Pack years: 15.00     Types: Cigarettes    Smokeless tobacco: Never Used   Substance Use Topics    Alcohol use: No     Comment: quit 2006     Drug use: Never       ALLERGIES    Allergies   Allergen Reactions    Lisinopril Other (See Comments)     Severe hypotension       MEDICATIONS    No current facility-administered medications on file prior to encounter.       Current Outpatient Medications on File Prior to Encounter   Medication Sig Dispense Refill    carvedilol (COREG) 3.125 MG tablet TAKE 1 TABLET BY MOUTH TWICE A DAY WITH MEALS 180 tablet 3    atorvastatin (LIPITOR) 80 MG tablet TAKE 1 TABLET BY MOUTH EVERY DAY AT NIGHT 90 tablet 3    doxepin (SINEQUAN) 25 MG capsule TAKE 1 CAPSULE BY MOUTH EVERY DAY AT NIGHT      KLOR-CON M20 20 MEQ extended release tablet TAKE 2 TABLETS BY MOUTH 4 TIMES DAILY 720 tablet 1    methocarbamol (ROBAXIN) 500 MG tablet Take 500 mg by mouth 2 times daily      midodrine (PROAMATINE) 5 MG tablet Take 5 mg by mouth 2 times daily      spironolactone (ALDACTONE) 50 MG tablet Take 50 mg by mouth daily      torsemide (DEMADEX) 20 MG tablet Take 50 mg by mouth daily       metOLazone (ZAROXOLYN) 2.5 MG tablet Take 2.5 mg by mouth three times a week On Mon Weds and Friday      tiotropium (SPIRIVA RESPIMAT) 2.5 MCG/ACT AERS inhaler INHALE 2 PUFFS INTO THE LUNGS DAILY 1 Inhaler 6    Insulin Degludec (TRESIBA FLEXTOUCH) 100 UNIT/ML SOPN Inject 30 Units into the skin nightly 10 pen 5    traZODone (DESYREL) 100 MG tablet Take 100 mg by mouth nightly as needed for Sleep Can take 1 or 2 tabs nightly as directed      albuterol sulfate  (90 Base) MCG/ACT inhaler Inhale 2 puffs into the lungs every 6 hours as needed for Wheezing or Shortness of Breath 1 Inhaler 3    buprenorphine-naloxone (SUBOXONE) 8-2 MG FILM SL film Place 1 Film under the tongue 2 times daily.  benztropine (COGENTIN) 1 MG tablet Take 1 mg by mouth nightly       pantoprazole (PROTONIX) 40 MG tablet Take 1 tablet by mouth 2 times daily 60 tablet 0    clopidogrel (PLAVIX) 75 MG tablet TAKE 1 TABLET BY MOUTH EVERY DAY 30 tablet 5    magnesium oxide (MAG-OX) 400 (240 Mg) MG tablet TAKE 1 TABLET ONCE DAILY 30 tablet 11    busPIRone (BUSPAR) 30 MG tablet Take 30 mg by mouth 2 times daily       aspirin EC 81 MG EC tablet Take 1 tablet by mouth daily 30 tablet 3    ARIPiprazole (ABILIFY) 15 MG tablet Take 15 mg by mouth daily       lamoTRIgine (LAMICTAL) 200 MG tablet Take 200 mg by mouth 2 times daily       INSULIN LISPRO SC Inject 10 Units into the skin 3 times daily (before meals)      nitroGLYCERIN (NITROSTAT) 0.4 MG SL tablet Place 0.4 mg under the tongue every 5 minutes as needed for Chest pain up to max of 3 total doses. If no relief after 1 dose, call 911.       Blood Glucose Monitoring Suppl (FREESTYLE FREEDOM LITE) w/Device KIT Use to test glucose 4 times a day 1 kit 0    blood glucose test strips (FREESTYLE LITE) strip USE TO CHECK BLOOD GLUCOSE LEVELS FOUR TIMES DAILY 200 strip 10    Insulin Pen Needle (B-D ULTRAFINE III SHORT PEN) 31G X 8 MM MISC Five L97.921    Ulcer of right leg, limited to breakdown of skin ([facility] L97.911    Arthritis M19.90    Cardiomyopathy ([facility] I42.9    Chronic systolic heart failure ([facility]) I50.22    Peripheral venous insufficiency I87.2    Atherosclerosis of native artery of both lower extremities with bilateral ulceration of calves ([facility]) I70.232, I70.242    Acute kidney injury superimposed on CKD ([facility] N17.9, N18.9    Morbid obesity with BMI of 40.0-44.9, adult ([facility]) E66.01, Z68.41    CKD (chronic kidney disease) stage 4, GFR 15-29 ml/min ([facility]) N18.4    Hypokalemia E87.6    Dyspnea on exertion R06.00    Ischemic heart disease I25.9    Moderate protein-calorie malnutrition ([facility]) E44.0    DKA, type 2, not at goal [facility]) E11.10    Acute on chronic systolic CHF (congestive heart failure) ([facility]) U92.53    Acute systolic CHF (congestive heart failure) ([facility]) I50.21    CHF (congestive heart failure), NYHA class I, acute on chronic, combined ([facility] I50.43       Measurements:  Wound 01/11/21 Leg Right (Active)   Wound Image   01/12/21 0631   Wound Etiology Venous 01/12/21 0853   Dressing/Treatment Open to air 01/13/21 0856   Number of days: 1       Wound 01/11/21 Leg Left (Active)   Wound Image   01/12/21 0631   Wound Etiology Venous 01/12/21 0853   Dressing/Treatment Ace wrap 01/12/21 0631   Wound Assessment Pink/red 01/13/21 0856   Number of days: 1       Wound 01/11/21 Heel Right deep tissue (Active)   Wound Image   01/12/21 0631   Wound Etiology Deep tissue/Injury 01/12/21 0853   Number of days: 1       Wound 01/11/21 Chest (Active)   Wound Image   01/12/21 0631   Number of days: 1       Wound 01/11/21 Other (Comment) Left left foot toes (Active)   Wound Image   01/12/21 0631   Wound Etiology Pressure Unstageable 01/12/21 0853   Number of days: 1      Chest: scattered partial and full thickness skin loss.   They are dry and scabbing over    BLE:  Several scattered areas of dry black scabs and full and partial thickness skin loss, some are moist and weeping, some are dry. BLE are edematous and taut. Slight hemosiderin staining present. Photos did not upload. Pt has nonblanchable redness on her sacrum. Stage 1 pressure injury present. She states it has been sore prior to admission. Plan    BLE: cleanse open areas with NS, pat dry. Apply Xeroform gauze over open scabbed areas, cover with dry 4x4 gauze sand secure with roll gauze. Can resume ace wraps for compression once patient is further diuresed. Chest:  Protect surrounding skin with barrier wipe. Then apply Hydrocolloid over cluster of wounds on chest, change every 3-5 days and prn. Left 1st and 2nd distal toes necrotic areas:  Paint with Betadine and leave TAMMIE daily. Plan of Care: Wound 01/11/21 Leg Right-Dressing/Treatment: Open to air  Wound 01/11/21 Leg Left-Dressing/Treatment: Ace wrap    Specialty Bed Required : No   [] Low Air Loss   [] Pressure Redistribution  [] Fluid Immersion  [] Bariatric  [] Total Pressure Relief  [] Other:     Current Diet: DIET CARB CONTROL;   Dietician consult:  Yes    Discharge Plan:  Placement for patient upon discharge: home with support    Patient appropriate for Outpatient [address]: Yes    Referrals:  []   [] [email or web address] [facility]  [] Supplies  [] Other    Patient/Caregiver Teaching:  Level of patient/caregiver understanding able to:   [] Indicates understanding       [] Needs reinforcement  [] Unsuccessful      [x] Verbal Understanding  [] Demonstrated understanding       [] No evidence of learning  [] Refused teaching         [] N/A       Electronically signed by Tara Kidd RN, CWOCN on 1/13/2021 at 12:49 PM

## 2021-01-13 NOTE — PROGRESS NOTES
New PIV placed in [facility]. X2 attempts. Old site leaking when flushed. Pt with no acute distress noted call light in reach.

## 2021-01-13 NOTE — FLOWSHEET NOTE
01/13/21 0845   Vital Signs   Temp 97 °F (36.1 °C)   Temp Source Oral   Pulse 92   Heart Rate Source Monitor   Resp 18   /74   BP Location Right upper arm   Patient Position Sitting   Level of Consciousness Alert (0)   MEWS Score 1   Patient Currently in Pain Denies   Oxygen Therapy   SpO2 97 %   O2 Device None (Room air)   VSS. Denies any complains of. 97% on RA. Denies any complain of SOB. . Generalized edema . BLE with 1+ pitting edema. Scattered scabs on arms and legs states dog scratches her, patient picks at them till they bleed, areas cleaned left TAMMIE.

## 2021-01-13 NOTE — CARE COORDINATION
INTERDISCIPLINARY PLAN OF CARE CONFERENCE    Date/Time: 1/13/2021 10:38 AM  Completed by: Ambar Rocha, Case Management      Patient Name:  Yas Badillo  YOB: 1965  Admitting Diagnosis: Acute systolic CHF (congestive heart failure) ([facility]) [I50.21]  CHF (congestive heart failure), NYHA class I, acute on chronic, combined ([facility] [I50.43]     Admit Date/Time:  1/11/2021  3:13 PM    Chart reviewed. Interdisciplinary team contacted or reviewed plan related to patient progress and discharge plans. Disciplines included Case Management, Nursing, and Dietitian. Current Status:inpt  PT/OT recommendation for discharge plan of care: tgd    Expected D/C Disposition:  Home  Confirmed plan with patient and/or family     Discharge Plan Comments: Reviewed chart. Plan continues home with Curlee Must and friend. Following for d/c needs.     Home O2 in place on admit: No

## 2021-01-13 NOTE — PROGRESS NOTES
Returned to floor, VSS. Denies any complains of.  Results of Venous study obtained and given to Dr Viki Leonardo holding januvia  cont low ISS  A1c = 6.3

## 2021-01-14 NOTE — PROGRESS NOTES
Progress Note    Admit Date:  1/11/2021    Admitted for acute on chronic CHF    Subjective:  Ms. Pavan Tena reports she is already feeling  better. Breathing better today. Feels the swelling in her legs has improved a bit. Still having some pain to the RLE and calf. diureised well. Objective:   No data found. Intake/Output Summary (Last 24 hours) at 1/14/2021 0824  Last data filed at 1/14/2021 0810  Gross per 24 hour   Intake 1080 ml   Output 6200 ml   Net -5120 ml       Physical Exam:  Gen: Obese female, No distress. Alert. Eyes: No conjunctival injection. ENT: No discharge. Pharynx clear. Neck:  Trachea midline. Resp: No accessory muscle use. Faint crackles. No wheezes. No rhonchi. On RA  CV: Regular rate. Regular rhythm. No murmur. No rub. ++ edema. Capillary Refill: Brisk,< 3 seconds   Peripheral Pulses: +2 palpable, equal bilaterally   GI: Non-tender. Non-distended. Normal bowel sounds. Skin: Warm and dry. Multiple shea of superficial wounds and ulcerations to chest wall, BUEs, and BLEs with erythema and mild warmth diffusely and weeping from RLE  M/S:++ edema with TTP to the R Calf with increased swelling of RLE compared to the LLE  Neuro: Awake. Grossly nonfocal    Psych: Oriented x 3. No anxiety or agitation.        Scheduled Meds:   povidone-iodine   Topical Daily    furosemide  80 mg Intravenous BID    lamoTRIgine  200 mg Oral BID    ARIPiprazole  15 mg Oral Daily    aspirin EC  81 mg Oral Daily    busPIRone  30 mg Oral BID    magnesium oxide  400 mg Oral Daily    clopidogrel  75 mg Oral Daily    pantoprazole  40 mg Oral BID    benztropine  1 mg Oral Nightly    buprenorphine-naloxone  1 Film Sublingual BID    insulin glargine  30 Units Subcutaneous Nightly    tiotropium  2 puff Inhalation Daily    insulin lispro  10 Units Subcutaneous TID AC    methocarbamol  500 mg Oral BID    midodrine  5 mg Oral BID    carvedilol  3.125 mg Oral BID WC    atorvastatin  80 mg Oral Daily    sodium chloride flush  10 mL Intravenous 2 times per day    insulin lispro  0-12 Units Subcutaneous TID WC    insulin lispro  0-6 Units Subcutaneous Nightly    heparin (porcine)  5,000 Units Subcutaneous 3 times per day       Continuous Infusions:      PRN Meds:  oxyCODONE, traZODone, nitroGLYCERIN, sodium chloride flush, promethazine **OR** [DISCONTINUED] ondansetron, polyethylene glycol, acetaminophen **OR** acetaminophen, ipratropium-albuterol      Data:  CBC:   Recent Labs     01/11/21  1540 01/12/21  0123   WBC 7.3 7.0   HGB 13.1 12.3   HCT 40.3 37.4   MCV 91.1 92.0    247     BMP:   Recent Labs     01/12/21  0123 01/13/21  0309 01/14/21  0447   * 133* 133*   K 4.3 4.2 3.2*   CL 93* 91* 89*   CO2 28 29 29   PHOS  --  4.4 4.0   BUN 47* 49* 48*   CREATININE 2.3* 2.2* 2.2*     LIVER PROFILE:   Recent Labs     01/11/21  1540 01/12/21  0123   AST 23 18   ALT 16 12   BILIDIR  --  0.7*   BILITOT 1.7* 1.2*   ALKPHOS 267* 225*     CULTURES  SARS-CoV-2, NAAT Not Detected         RADIOLOGY  VL Extremity Venous Bilateral   Final Result      XR CHEST (2 VW)   Final Result   Cardiomegaly. No convincing evidence for acute cardiopulmonary pathology. EKG  Normal sinus rhythm  Left axis deviation  Left anterior fascicular block  Right bundle branch block  Anterolateral infarct , age undetermined  Nonspecific ST abnormality    Previous   [facility] 8/18/20  CONCLUSIONS:      Elevated filling pressures, continue diuresis  CardioMEMS had good correlation with the right heart catheterization numbers.   Severe pulmonary hypertension  Patent LIMA to LAD graft however there appears to be coronary steal phenomenon due to severe left subclavian stenosis  We will consider left subclavian intervention  Will obtain follow-up vascular ultrasound to assess this  We will consult with CT surgery regarding therapeutic options        Venous doppler LEs  Within the limits of this exam, there is no evidence of deep or superficial  venous thrombosis in the lower extremities bilaterally.    Occluded arterial stent involving the ostial/proximal segment of the left    superficial femoral artery with monophasic dampened flow returning just    beyond the stent at the proximal segment of the thigh. Assessment/Plan:  Acute on chronic sCHF  Severe pulm HTN  - Cardiomegaly on CXR, volume overload on exam, BNP elevated at 3512   - Last Echo with EF 25%  - Cardiology consult   - one dose of iv lasix 80 mg 1/12   - Strict I&O's   - daily weights   - nephrology consult for diuretic dosing    One dose of metolazone yesterday  Negative 5 L in the last 24 hours     SHAUNNA on CKD Stage III   - Baseline creatinine ~1.3-1.6  - Creatinine on admission 2.8-->2.3 --2.2  Recd one dose of iv lasix 80 mg yesterday  nephro following       BLE edema  - likely 2/ CHF and severe pulm HTN  - RLE >LLE, with pain in R calf   - Venous Doppler BLE to rule out DVT   - Reports R is always more swollen than the L but has recently been having intermittent pain as well   Venous Doppler negative for DVT, however shows occluded arterial stent left superficial femoral artery. Patient made aware.   [facility]     Hyponatremia  - NA on admission 130-->134--133  - suspect 2/2 volume overload   - monitor      CAD s/p CABG in Jan 2020   Ischemic Cardiomyopathy   Elevated troponin   - Initial trop 0.02--> 0.02-->0.02  - Likely 2/2 SHAUNNA and CHF AE  - Tele monitor   - C in Aug with severe pulm HTN and coronary steal syndrome with left subclavian stenosis-->s/p L subclavian angio/stent on 9/1/20  - Continue ASA, Plavix, statin and BB    Abnormal LFTs  - monitor     DM2  - Continue Lantus and SSI     PVD  - Continue ASA and Plavix       DVT Prophylaxis: heparin   Diet: DIET CARB CONTROL;  Code Status: Full Code    D/c planning   Melissa Ezequiel 8:24 AM 1/14/2021

## 2021-01-14 NOTE — CARE COORDINATION
DISCHARGE ORDER  Date/Time 2021 12:11 PM  Completed by: Ab Vu, Case Management    Patient Name: Jacques Wasserman      : 1965  Admitting Diagnosis: Acute systolic CHF (congestive heart failure) ([facility]) [I50.21]  CHF (congestive heart failure), NYHA class I, acute on chronic, combined ([facility] [I50.43]      Admit order Date and Status:inpt  (verify MD's last order for status of admission)      Noted discharge order. If applicable PT/OT recommendation at Discharge: na  DME recommendation by PT/OT:na  Confirmed discharge plan  (pt)    Discharge Plan: Reviewed chart. Writer spoke with the pt via TC. Pt declines hhc at this time. No other d/c needs voiced or identified. Reviewed chart. Role of discharge planner explained and patient verbalized understanding. Discharge order is noted. Has Home O2 in place on admit:  No  Informed of need to bring portable home O2 tank on day of discharge for nursing to connect prior to leaving:   Not Indicated  Verbalized agreement/Understanding:   Not Indicated  Pt is being d/c'd to home today. Pt's O2 sats are 98% on RA. Discharge timeout done with RAYMUNDO Oliver. All discharge needs and concerns addressed.

## 2021-01-14 NOTE — PROGRESS NOTES
Pt alert and oriented x4. Pt able to make needs known. Pt cont with PIV in left arm. Pt with no O2 therapy. Pt with no acute distress noted call light in reach.

## 2021-01-14 NOTE — PROGRESS NOTES
Patient educated on discharge instructions as well as new medications use, dosage, administration and possible side effects. Patient verified knowledge. IV removed without difficulty and dry dressing in place. Telemetry monitor removed and returned to Counts include 234 beds at the [facility]. Pt left facility in stable condition to Home with all of their personal belongings.

## 2021-01-14 NOTE — DISCHARGE SUMMARY
Name:  Floresita Hidalgo  Room:  /9763-47  MRN:    [id number]    Discharge Summary      This discharge summary is in conjunction with a complete physical exam done on the day of discharge. Attending Physician: Dr. Consuelo Alvarez  Discharging Physician: Dr. Josseline Robbinsnt: 1/11/2021  Discharge:   1/14/2021    HPI:  60 y.o. female presents with increasing leg swelling over the past two weeks with increased martin, sob. She denies chest pain except when coughing, and notes a 20 week history of persistent cough with prior negative covid tests. She recalls having some nausea with non bloody, non bilious emesis 2 hours post prandial over the past 2 days. She denies fevers, chills, generalized weakness. She denies change or non compliance of her diuretic regimen, and notes some urgency but denies urinary hesitancy, hematuria or dysuria. Currently patient is seen on room air, in no distress with only current complaint being that of increased pain from bilat legs due to increased swelling. Diagnoses this Admission and Hospital Course   Acute on chronic sCHF  Severe pulm HTN  - Cardiomegaly on CXR, volume overload on exam, BNP elevated at 3512   - Last Echo with EF 25%  - Cardiology consulted   - one dose of iv lasix 80 mg 1/12   - Strict I&O's   - daily weights   - nephrology consulted for diuretic dosing    One dose of metolazone yesterday  Negative 5 L in the last 24 hours      SHAUNNA on CKD Stage III   - Baseline creatinine ~1.3-1.6  - Creatinine on admission 2.8-->2.3 --2.2  Recd one dose of iv lasix 80 mg yesterday  nephro following         BLE edema  - likely 2/ CHF and severe pulm HTN  - RLE >LLE, with pain in R calf   - Venous Doppler BLE to rule out DVT   - Reports R is always more swollen than the L but has recently been having intermittent pain as well   Venous Doppler negative for DVT, however shows occluded arterial stent left superficial femoral artery. Patient made aware.   She will follow up with  Rhonda      Hyponatremia  - NA on admission 130-->134--133  - suspect 2/2 volume overload   - monitored      CAD s/p CABG in Jan 2020   Ischemic Cardiomyopathy   Elevated troponin   - Initial trop 0.02--> 0.02-->0.02  - Likely 2/2 SHAUNNA and CHF AE  - Tele monitoring   - LHC in Aug with severe pulm HTN and coronary steal syndrome with left subclavian stenosis-->s/p L subclavian angio/stent on 9/1/20  - Continued ASA, Plavix, statin and BB     Abnormal LFTs  - monitored      DM2  - Continued Lantus and SSI      PVD  - Continued ASA and Plavix         DVT Prophylaxis: heparin     Procedures (Please Review Full Report for Details)  N/A    Consults    Wound care RN  Cardiology  Nephrology       Physical Exam at Discharge:    /73   Pulse 93   Temp 97.2 °F (36.2 °C) (Oral)   Resp 16   Ht 5' 4\" (1.626 m)   Wt 236 lb 1.6 oz (107.1 kg)   LMP 10/24/2012   SpO2 98%   BMI 40.53 kg/m²     See today's progress note    CBC:   Recent Labs     01/11/21  1540 01/12/21  0123   WBC 7.3 7.0   HGB 13.1 12.3   HCT 40.3 37.4   MCV 91.1 92.0    247     BMP:   Recent Labs     01/12/21  0123 01/13/21  0309 01/14/21  0447   * 133* 133*   K 4.3 4.2 3.2*   CL 93* 91* 89*   CO2 28 29 29   PHOS  --  4.4 4.0   BUN 47* 49* 48*   CREATININE 2.3* 2.2* 2.2*     LIVER PROFILE:   Recent Labs     01/11/21  1540 01/12/21  0123   AST 23 18   ALT 16 12   BILIDIR  --  0.7*   BILITOT 1.7* 1.2*   ALKPHOS 267* 225*     UA:  Recent Labs     01/11/21  1641   COLORU Yellow   PHUR 7.0   CLARITYU Clear   SPECGRAV 1.010   LEUKOCYTESUR Negative   UROBILINOGEN 4.0*   BILIRUBINUR Negative   BLOODU Negative   GLUCOSEU Negative       CARDIAC ENZYMES  Recent Labs     01/11/21  1540 01/11/21  2202 01/12/21  0123   TROPONINI 0.02* 0.02* 0.02*       ABG    Lab Results   Component Value Date    ULS9NUZ 26.1 01/28/2020    BEART 1 01/28/2020    Q9MYVYZC 96 01/28/2020    PHART 7.381 01/28/2020    ZVN6FZM 44.0 01/28/2020    PO2ART 82.7 01/28/2020    RGK3FKZ 28 01/28/2020         CULTURES  SARS-CoV-2, NAAT Not Detected        RADIOLOGY  VL Extremity Venous Bilateral   Final Result      XR CHEST (2 VW)   Final Result   Cardiomegaly. No convincing evidence for acute cardiopulmonary pathology. Venous doppler LEs  Within the limits of this exam, there is no evidence of deep or superficial    venous thrombosis in the lower extremities bilaterally.    Occluded arterial stent involving the ostial/proximal segment of the left    superficial femoral artery with monophasic dampened flow returning just    beyond the stent at the proximal segment of the thigh. Previous   [facility] 8/18/20  CONCLUSIONS:      Elevated filling pressures, continue diuresis  CardioMEMS had good correlation with the right heart catheterization numbers.   Severe pulmonary hypertension  Patent LIMA to LAD graft however there appears to be coronary steal phenomenon due to severe left subclavian stenosis  We will consider left subclavian intervention  Will obtain follow-up vascular ultrasound to assess this  We will consult with CT surgery regarding therapeutic options        Discharge Medications     Medication List      CHANGE how you take these medications    metOLazone 5 MG tablet  Commonly known as: ZAROXOLYN  Take 1 tablet by mouth three times a week On Mon Weds and Friday  What changed:   · medication strength  · how much to take     torsemide 100 MG tablet  Commonly known as: DEMADEX  Take 1 tablet by mouth daily  What changed:   · medication strength  · how much to take        CONTINUE taking these medications    albuterol sulfate  (90 Base) MCG/ACT inhaler  Inhale 2 puffs into the lungs every 6 hours as needed for Wheezing or Shortness of Breath     ARIPiprazole 15 MG tablet  Commonly known as: ABILIFY     aspirin EC 81 MG EC tablet  Take 1 tablet by mouth daily     atorvastatin 80 MG tablet  Commonly known as: LIPITOR  TAKE 1 TABLET BY MOUTH EVERY DAY AT NIGHT     B-D ULTRAFINE III SHORT PEN 31G X 8 MM Misc  Generic drug: Insulin Pen Needle  Five shots a day     benztropine 1 MG tablet  Commonly known as: COGENTIN     busPIRone 30 MG tablet  Commonly known as: BUSPAR     carvedilol 3.125 MG tablet  Commonly known as: COREG  TAKE 1 TABLET BY MOUTH TWICE A DAY WITH MEALS     clopidogrel 75 MG tablet  Commonly known as: PLAVIX  TAKE 1 TABLET BY MOUTH EVERY DAY     doxepin 25 MG capsule  Commonly known as: SINEQUAN     FreeStyle Freedom Lite w/Device Kit  Use to test glucose 4 times a day     FREESTYLE LITE strip  Generic drug: blood glucose test strips  USE TO CHECK BLOOD GLUCOSE LEVELS FOUR TIMES DAILY     INSULIN LISPRO SC     INSULIN SYRINGE .5CC/29G 29G X 1/2\" 0.5 ML Misc  1 each by Does not apply route daily     Klor-Con M20 20 MEQ extended release tablet  Generic drug: potassium chloride  TAKE 2 TABLETS BY MOUTH 4 TIMES DAILY     lamoTRIgine 200 MG tablet  Commonly known as: LAMICTAL     magnesium oxide 400 (240 Mg) MG tablet  Commonly known as: MAG-OX  TAKE 1 TABLET ONCE DAILY     methocarbamol 500 MG tablet  Commonly known as: ROBAXIN     midodrine 5 MG tablet  Commonly known as: PROAMATINE     nitroGLYCERIN 0.4 MG SL tablet  Commonly known as: NITROSTAT     pantoprazole 40 MG tablet  Commonly known as: PROTONIX  Take 1 tablet by mouth 2 times daily     spironolactone 50 MG tablet  Commonly known as: ALDACTONE     Suboxone 8-2 MG Film SL film  Generic drug: buprenorphine-naloxone     tiotropium 2.5 MCG/ACT Aers inhaler  Commonly known as: Spiriva Respimat  INHALE 2 PUFFS INTO THE LUNGS DAILY     traZODone 100 MG tablet  Commonly known as: SHARON Ludwig Scot FlexTouch 100 UNIT/ML Sopn  Generic drug: Insulin Degludec  Inject 30 Units into the skin nightly           Where to Get Your Medications      These medications were sent to [facility] [address]  [address] [address]    Phone: [phone] · torsemide 100 MG tablet     Information about where to get these medications is not yet available    Ask your nurse or doctor about these medications  · metOLazone 5 MG tablet           Discharged in stable condition to home    Follow Up: Follow up with PCP in 1 week    KRIS Randolph.

## 2021-01-28 NOTE — PROGRESS NOTES
Physician Progress Note      Naomie Christian  CSN #:                  [id number]  :                       1965  ADMIT DATE:       2021 3:13 PM  DISCH DATE:        2021 2:11 PM  RESPONDING  PROVIDER #:        Robert Mullen MD          QUERY TEXT:    Patient admitted with CHF. Per Wound Care, , noted to also have unstageable   pressure ulcer on left foot, DTI of right heel, and stage 1 pressure injury of   sacrum. If possible, please document in progress notes and discharge summary   the type of ulcer, site of the ulcer and present on admission status of the   ulcer: The medical record reflects the following:  Risk Factors: PVD, decreased mobility, leg swelling  Clinical Indicators: Wound care classifies unstageable pressure ulcer on left   foot, DTI of right heel, and stage 1 pressure injury of sacrum  Treatment: Wound care consult, supportive care    Thank you,  Carl Olivares RN, CDS  [phone]  Options provided:  -- Unstageable pressure ulcer on left foot, DTI of right heel, and stage 1   pressure injury of sacrum present on admission  -- Other - I will add my own diagnosis  -- Disagree - Not applicable / Not valid  -- Disagree - Clinically unable to determine / Unknown  -- Refer to Clinical Documentation Reviewer    PROVIDER RESPONSE TEXT:    This patient has an unstageable pressure ulcer on left foot, DTI of right   heel, and stage 1 pressure injury of sacrum present on admission.     Query created by: Jerri Hwang on 2021 8:07 AM      Electronically signed by:  Robert Mullen MD 2021 12:39 PM

## 2021-02-01 NOTE — TELEPHONE ENCOUNTER
Medication:   Requested Prescriptions     Pending Prescriptions Disp Refills    Insulin Degludec (TRESIBA FLEXTOUCH) 100 UNIT/ML SOPN 10 pen 5     Sig: Inject 30 Units into the skin nightly    Insulin Pen Needle (B-D ULTRAFINE III SHORT PEN) 31G X 8 MM MISC 200 each 5     Sig: Five shots a day    Liraglutide (VICTOZA) 18 MG/3ML SOPN SC injection 2 pen 5     Sig: Inject 1.8mg into the skin daily    insulin lispro (HUMALOG KWIKPEN U-200) 200 UNIT/ML SOPN pen 2 pen 5     Sig: Inject 10 Units into the skin 3 times daily (before meals)         Last appt: 9/22/2020   Next appt: 2/11/2021    Last Labs DM:   Lab Results   Component Value Date    LABA1C 11.5 12/21/2020

## 2021-02-02 NOTE — PROCEDURES
[address]                 [address] [address]                               PULMONARY FUNCTION    PATIENT NAME: Nelly BADILLO                    :        1965  MED REC NO:   [id number]                          ROOM:  ACCOUNT NO:   [de-identified]                           ADMIT DATE: 2021  PROVIDER:     Mian Can MD    DATE OF PROCEDURE:  2021    ORDERING PROVIDER:  Billy Mcgill CNP    GIVEN DIAGNOSIS:  Chronic cough. FINDINGS:  1. Spirometry: The FEV1 is 1.17 liters or 44% of predicted. The FVC  is 1.73 liters or 51% of predicted. The FEV1/FVC ratio is 68%. There  is a significant response to inhaled bronchodilators with improvement in  the FEV1 by 17%. 2.  Flow volume loops: Show an obstructive defect pattern. IMPRESSION:  1. There is a moderate-to-severe obstructive lung defect with a  significant response to inhaled bronchodilators. 2.  Clinical correlation is recommended.         Beth Alva MD    D: 2021 9:55:24       T: 2021 13:05:21     [email or web address]  Job#: [id number]     Doc#: [id number]    CC:

## 2021-02-04 NOTE — TELEPHONE ENCOUNTER
Per pt was told to ask npdd if she should get the Covid-Vaccine?     Last ov 11.30.20  Nex ov 02.22.21

## 2021-02-11 NOTE — ASSESSMENT & PLAN NOTE
Reviewed carbohyrate and caloric restriction--> high carb intake continues  No weight loss, water retention +, pedal edema ++

## 2021-02-11 NOTE — PROGRESS NOTES
Endocrinology  Becca Multani, CNP, [address] [facility] [address] [facility], [address]  Phone [phone]  Fax [phone]    Kaya Batista is  being evaluated by a Virtual Visit (video visit) encounter to address concerns as mentioned above. Due to this being a TeleHealth encounter (During Q- public health emergency), evaluation of the following organ systems was limited: Vitals/Constitutional/EENT/Resp/CV/GI//MS/Neuro/Skin/Heme-Lymph-Imm. Pursuant to the emergency declaration under the [address] [place] [address], [address] [place] [address] authority and the [employer] Act, this Virtual Visit was conducted with patient's (and/or legal guardian's) consent, to reduce the patient's risk of exposure to COVID-19 and provide necessary medical care. The patient (and/or legal guardian) has also been advised to contact this office for worsening conditions or problems, and seek emergency medical treatment and/or call 911 if deemed necessary. Services were provided through a video synchronous discussion virtually to substitute for in-person clinic visit. Patient and provider were located at their individual homes    Patient was identified via name,  on the video visit      Kaya Batista is a 60 y.o. female who is a new patient presenting for management of Diabetes Mellitus Type 2.     Last A1C:   Lab Results   Component Value Date    LABA1C 11.5 2020    LABA1C 7.2 2020    LABA1C 6.3 2020     Last BP Readings:  BP Readings from Last 3 Encounters:   21 107/73   20 113/69   20 132/86     Last LDL:   Lab Results   Component Value Date    LDLCALC 70 2021    LDLDIRECT 187 (H) 2017     Aspirin Use: 81 mg    Tobacco/Alcohol History:    Smoking status: Current Every Day Smoker--> Quit at last hospital admission     Packs/day: 0.25     Years: 30.00     Pack years: 7.50 Types: Cigarettes    Smokeless tobacco: Never Used    Tobacco comment: 10 cigs per day    Alcohol use: No     Diabetes:  ? Zenaida Conde  was diagnosed with diabetes type 2 in : 15 years ago, screened and BG = 400  ? On insulin: > 3 years  ? Patient reports that diabetes was generally not well controlled, doing better now  ? Disease course has been variable   ? Current microvascular complications include peripheral neuropathy, blurry vision,   ? Current Macrovascular complications include 3 MIs, 6 stents placed ( last one in 3 years ago) . Has a Cardio MEMS implant Pumonary Artery HTN. . Monitored per cardiology  ? Patient reports compliance for about 80% of the time and adheres to medication, but usually not to diet and exercise instructions. ? Admission for DKA in 6/2019 @ . And 7/72019 for unresponsive with hyperglycemia of 562, 12/20/2020, 1/11/2021  ? Recent : cardiac catheterization , CABG on 1/27/2020  ? Other ED visit include for :  recent inpatient CHF  ?  Has had URTI for > 1.5 months per patient, and BG high with steroid protocol      PMH includes  Past Medical History:   Diagnosis Date    Acute blood loss anemia 8/5/2020    Acute kidney injury ([address] 12/25/2019    Acute kidney injury superimposed on CKD ([address] 8/5/2020    Acute on chronic combined systolic and diastolic congestive heart failure ([address] 1/10/2020    Acute on chronic right-sided heart failure ([address] 08/2020    Alcohol dependence ([address] 3/10/2010    Arthritis     Asthma     CAD (coronary artery disease)     Cellulitis 6/3/2020    COPD (chronic obstructive pulmonary disease) (HCC)     Diabetic ulcer of left foot associated with type 2 diabetes mellitus, limited to breakdown of skin ([address] 1/18/2020    Diabetic ulcer of toe of right foot associated with type 2 diabetes mellitus ([address] 1/18/2020    Left renal artery stenosis ([address] 08/2020    MI (myocardial infarction) ([address] 10/07/2019    NSTEMI  Positive FIT (fecal immunochemical test) 2020    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 steal syndrome 2020    Traumatic perinephric hematoma, left, initial encounter 2020     Blood glucose trends:  Patient brought log glucometer for download: no  Test BG 4 times a day patient report  BG Range:   No fasting numbers as up through night , snacks through the night  Range: 70 to > HI ---> 100- 150--> 200 - HI  Middle of night hypoglycemia  Hypoglycemia awareness and symptoms: sluggish, confused  Completed CGM pro     Component    Latest Ref Rng & Units 10/21/2019   C-Peptide    1.1 - 4.4 ng/mL 8.5 (H)   Glutamic Acid Decarb Ab 0.0 - 5.0 IU/mL < 0.5   INSULIN A    0.0 - 0.4 U/mL < 0.4     Current Medication regimen:   Injects 5 times a day  Tresiba: 45 units--> 45 units--> 45 units--> 30 units  Humalo units AC TID--> ran out of humalog for > 1 week and did not call for refills---> rarely using this--> now 10 AC TID  Victoza: 1.8 mg    Previously patient was started on Tresiba however she did not realize she had to stop the Ininal. Has had some lows in 45s. Other meds tried in past: Metformin 1000 mg BID--> CKD3; Lantus: 35 units BID  GFR 57!--> 31!--> 38!--> 33!--> 22!--> 28! Current Dietary regimen:   BF :  Skip, skips insulin--> eggs etc   Lunch: may skip, sw, eats out (kareem vazquez); [employer] without bread  Dinner: left over, meats and vegetables  Snacks: sw : turkey etc/deli meat  Beverages: regular soda, approx 88 oz per day, water--> has quit her regular pop  Average carbs per day: has decreased to about 100/ day or less    Microvascular Complications:  · Neuropathy: tingling and numbness  · Retinopathy: no concerns with vision, field of vision  · Nephropathy: no recent MACR    Diabetic Health Maintenance   · Last Eye Exam: > 1 year ago, in 2019  · Last Foot exam:  Dr Patel, DPM  · Has patient seen a dietitian? Yes  · Current Exercise: No structured exercise. · On ACEI or ARB: other antihypertensives used  · On statin: Lipitor 80 mg    Hyperlipidemia: Current complaints include occasional myalgias but otherwise tolerates well. Lab Results   Component Value Date    CHOL 134 01/12/2021    CHOL 58 01/27/2020    CHOL 158 01/11/2020     Lab Results   Component Value Date    TRIG 123 01/12/2021    TRIG 78 01/27/2020    TRIG 140 01/11/2020     Lab Results   Component Value Date    HDL 39 01/12/2021    HDL 21 01/27/2020    HDL 27 01/11/2020     Lab Results   Component Value Date    LDLCALC 70 01/12/2021    LDLCALC 21 01/27/2020    LDLCALC 103 01/11/2020     Lab Results   Component Value Date    LDLDIRECT 187 08/01/2017     No results found for: CHOLHDLRATIO    Hypertension   Controlled , denies symptoms of dizziness, light headedness. Occasional dependent edema. Tries to follow a salt restricted diet.    Lab Results   Component Value Date     (L) 01/14/2021    K 3.2 (L) 01/14/2021    CL 89 (L) 01/14/2021    CO2 29 01/14/2021    BUN 48 (H) 01/14/2021    CREATININE 2.2 (H) 01/14/2021    GLUCOSE 86 01/14/2021    CALCIUM 9.9 01/14/2021    PROT 6.3 (L) 01/12/2021    LABALBU 3.7 01/14/2021    BILITOT 1.2 (H) 01/12/2021    ALKPHOS 225 (H) 01/12/2021    AST 18 01/12/2021    ALT 12 01/12/2021    LABGLOM 23 (A) 01/14/2021    GFRAA 28 (A) 01/14/2021    AGRATIO 1.0 (L) 01/11/2021    GLOB 3.6 01/11/2021     The 10-year ASCVD risk score (Nita Claire, et al., 2013) is: 9.1%    Values used to calculate the score:      Age: 60 years      Sex: Female      Is Non- : No      Diabetic: Yes      Tobacco smoker: Yes      Systolic Blood Pressure: 712 mmHg      Is BP treated: Yes      HDL Cholesterol: 39 mg/dL      Total Cholesterol: 134 mg/dL    Family History   Problem Relation Age of Onset    Heart Disease Maternal Grandmother     Cancer Mother         lung and brain cancer    Cancer Maternal Aunt         lung and brain cancer     Review of Systems Constitutional: Negative for activity change, appetite change, diaphoresis, fever and unexpected weight change. HENT: Negative for dental problem. Eyes: Negative for pain and visual disturbance. Respiratory: Negative for shortness of breath. Cardiovascular: Negative for chest pain, palpitations and leg swelling. Gastrointestinal: Negative for constipation, diarrhea and nausea. Endocrine: Negative for cold intolerance, heat intolerance, polydipsia, polyphagia and polyuria. Genitourinary: Negative for frequency and urgency. Musculoskeletal: Negative for arthralgias, joint swelling and myalgias. Skin: Negative for color change and pallor. Neurological: Negative for weakness, numbness and headaches. Psychiatric/Behavioral: Negative for dysphoric mood and sleep disturbance. The patient is not nervous/anxious. There were no vitals filed for this visit. televisit    Physical Exam televisit    Skeletal foot exam: televisit    ulcer on right and left toes, managed per podiatry and dressings not removed per patient request.  Toenails are normal. Pulses are +2 DP bilaterally. Skeletal structures are intact upon visual examination. Sensory: 10 g monofilament is 3/10 on the right and 3/10 on the left, 128 Hz vibration sense is decreased bilaterally.       Diabetes Continuous Glucose Monitoring Report      Reason for Study:   - Poorly controlled DM without success with standard interventions   - Glucose variability      Current Therapy:   Lantus: 35 units BID  Humalo units AC TID  Victoza 1.2 mg    CGMS Report   CGMS Device: Freestyle Yuliana Pro  Data collection from 19 to 19  Range set @   Average reading 196 mg/dL   Above target range 62%  In target range 38%  Below target range 0%      Impression:   Postprandial hyperglycemia   Persistent overnight hyperglycemia    Recommendation:   Current A1c   Lab Results   Component Value Date    LABA1C 11.5 2020     Goal A1c <6.5% Medication Change: no longer on Metformin  Insulin Change: see titration protocol below  Dietary Recommendations : discussed decreasing the carb intake: averaging > 15- grams per day    Assessment  Idolina Cushing is a 60 y.o. female with uncontrolled Diabetes Mellitus type 2 complicated by peripheral neuropathy, slow healing wounds, blurry vision, and microalbuminuria and assoc/iated with HTN, HLD, PAHTN, PVD, CAD, CLINT, obesity and periodontal disease    Plan  Problem List Items Addressed This Visit     CKD (chronic kidney disease) stage 4, GFR 15-29 ml/min ([facility])    Class 2 severe obesity due to excess calories with serious comorbidity and body mass index (BMI) of 39.0 to 39.9 in adult [facility])     Reviewed carbohyrate and caloric restriction--> high carb intake continues  No weight loss, water retention +, pedal edema ++             Dyslipidemia    Type 2 diabetes mellitus with diabetic polyneuropathy, with long-term current use of insulin ([facility]) - Primary     Lab Results   Component Value Date    LABA1C 11.5 12/21/2020     Goal A1c  < 6.5%  Tresiba: increase to 45 units, titrate Q3 days by 2 units to -150  Humalog: 15 units AC TID with 2:50 > 150 SSI  Avoid hypoglycemia    Diet :   30-40 grams per meal , 3 meals per day, avoid carbs in snack choices  Include moderate proteins and good fats  victoza d/c'ed at hospital visit   Ensure adequate hydration and  electrolyte replacement    Exercise :  Recommended exercise is 5-7 days a week for 30-60 mins at least, per day OR a total of 2.5 hours per week , which ever is more feasible.     Diabetic Health Maintenance  Follow up with annual eye exams  Follow up with annual podiatry exams if needed  Reviewed sick day management of BG     Other areas of Diabetic Education reviewed:  ? Carbs: good carbs and bad carbs, importance of carb counting, incorporation of protein with each meal to reduce Glycemic index, importance of portions, Carb/insulin ratio ? Fats: Good fats and bad fats, meal planning and supplements. ? Discussed how food affects blood sugar readings. ? Different diabetic medications  ? Managing high and low sugar readings  ? Rotation of sites for subcutaneous medication injection           Relevant Orders    Hemoglobin A1C      Greater than 25 minutes spent directly counseling patient about topics listed above (such as lifestyle modifications, preventative screenings and/or disease related processes. Return in about 3 months (around 5/11/2021).    Form for disability scanned in chart: completed

## 2021-02-12 NOTE — TELEPHONE ENCOUNTER
Received a CARDIOMEMS reading on her today. PAD 2 -stable.  - elevated. Keep appointment with me on 2/22/2021. She should get BMP and BNP done prior to that appointment.    Thank you,   TRAY England - CNP

## 2021-02-22 NOTE — PROGRESS NOTES
[facility]   Cardiac Evaluation    Primary Care Doctor:  Deena Kahn    Chief Complaint   Patient presents with   [address] from Hospital     No new complaints/symptoms    Congestive Heart Failure    Coronary Artery Disease        History of Present Illness:   I had the pleasure of seeing Bismark Lucero in follow up for CAD s/p PCI to LAD and LCx 2018, CABG X1 (LIMA to LAD,Jan 2020 for restenosis of LAD and LCx and significant dz in RCA). Hx of ICM, sCHF s/p CardioMEMs implant (2/2019), MR, PAD, CKD, DM, COPD, leg wounds. She was hospitalized January 11, 2021 to January 14, 2021 with edema, increased weight and shortness of breath. She was diuresed with IV Lasix and PO metolazone. At discharge she was continued on torsemide 100 mg daily, spironolactone 50 mg daily, and metolazone 5 mg Monday Wednesday and Fridays. She is on low-dose beta-blocker though no ACE or ARB due to renal insufficiency and hypotension requiring midodrine. She is doing fair. Her weight is down 30 lbs since hospitalization. She feels better at lower weight. She can now feel a knot/ mass in her abdomen. Still with cough, productive of greenish brown sputum. Same for months. Still smoking 1/2-1 ppd. Activity limited due to leg pain. Also very fatigued. Doesn't want to eat due to not wanting to gain weight back. Food doesn't taste good anymore. Drinking more water than pop now. Dry mouth, thirsty. Eats shaved ice. Blood sugars 140-200. HOME WEIGHTS:   2/22/21:  202 lbs. 11/30/20:  208 lbs  11/5/20 236 lbs   11/4/20 230 lbs  Baseline weight felt to be 218 lbs      Bismark Reason describes symptoms including dyspnea, fatigue, edema but denies palpitations, orthopnea, PND, early saiety, syncope.              NYHA:   III  ACC/ AHA Stage:    c    Past Medical History: reports that she has been smoking cigarettes. She has a 15.00 pack-year smoking history. She has never used smokeless tobacco. She reports that she does not drink alcohol or use drugs. Family History:   Family History   Problem Relation Age of Onset    Heart Disease Maternal Grandmother     Cancer Mother         lung and brain cancer    Cancer Maternal Aunt         lung and brain cancer       Home Medications:  Prior to Admission medications    Medication Sig Start Date End Date Taking?  Authorizing Provider   Insulin Degludec (TRESIBA FLEXTOUCH) 100 UNIT/ML SOPN Inject 30 Units into the skin nightly 2/1/21  Yes Mian Diss, APRN - CNP   Insulin Pen Needle (B-D ULTRAFINE III SHORT PEN) 31G X 8 MM MISC Five shots a day 2/1/21  Yes Mian Diss, APRN - CNP   insulin lispro (HUMALOG KWIKPEN U-200) 200 UNIT/ML SOPN pen Inject 10 Units into the skin 3 times daily (before meals) 2/1/21  Yes Mian DissTRAY CNP   torsemide (DEMADEX) 100 MG tablet Take 1 tablet by mouth daily 1/14/21  Yes Makayla Sotomayor MD   metOLazone (ZAROXOLYN) 5 MG tablet Take 1 tablet by mouth three times a week On Mon Weds and Friday 1/15/21  Yes Makayla Sotomayor MD   carvedilol (COREG) 3.125 MG tablet TAKE 1 TABLET BY MOUTH TWICE A DAY WITH MEALS 1/11/21  Yes TRAY Schwartz CNP   atorvastatin (LIPITOR) 80 MG tablet TAKE 1 TABLET BY MOUTH EVERY DAY AT NIGHT 1/11/21  Yes TRAY Salgado CNP   doxepin (SINEQUAN) 25 MG capsule TAKE 1 CAPSULE BY MOUTH EVERY DAY AT NIGHT 12/20/20  Yes Historical Provider, MD   KLOR-CON M20 20 MEQ extended release tablet TAKE 2 TABLETS BY MOUTH 4 TIMES DAILY 1/4/21  Yes TRAY Schwartz CNP   methocarbamol (ROBAXIN) 500 MG tablet Take 500 mg by mouth 2 times daily   Yes Historical Provider, MD   midodrine (PROAMATINE) 5 MG tablet Take 5 mg by mouth 2 times daily   Yes Historical Provider, MD spironolactone (ALDACTONE) 50 MG tablet Take 50 mg by mouth daily   Yes Historical Provider, MD   nitroGLYCERIN (NITROSTAT) 0.4 MG SL tablet Place 0.4 mg under the tongue every 5 minutes as needed for Chest pain up to max of 3 total doses. If no relief after 1 dose, call 911. Yes Historical Provider, MD   Blood Glucose Monitoring Suppl (FREESTYLE FREEDOM LITE) w/Device KIT Use to test glucose 4 times a day 11/17/20  Yes Scooter Harrison MD   blood glucose test strips (FREESTYLE LITE) strip USE TO CHECK BLOOD GLUCOSE LEVELS FOUR TIMES DAILY 11/17/20  Yes Scooter Harrison MD   tiotropium (SPIRIVA RESPIMAT) 2.5 MCG/ACT AERS inhaler INHALE 2 PUFFS INTO THE LUNGS DAILY 10/13/20  Yes Jm Winston MD   traZODone (DESYREL) 100 MG tablet Take 100 mg by mouth nightly as needed for Sleep Can take 1 or 2 tabs nightly as directed   Yes Historical Provider, MD   albuterol sulfate  (90 Base) MCG/ACT inhaler Inhale 2 puffs into the lungs every 6 hours as needed for Wheezing or Shortness of Breath 7/3/20  Yes Andrei Hernandez MD   buprenorphine-naloxone (SUBOXONE) 8-2 MG FILM SL film Place 1 Film under the tongue 2 times daily.    Yes Historical Provider, MD   benztropine (COGENTIN) 1 MG tablet Take 1 mg by mouth nightly  12/13/19  Yes Historical Provider, MD   INSULIN SYRINGE .5CC/29G 29G X 1/2\" 0.5 ML MISC 1 each by Does not apply route daily 12/3/19  Yes TRAY Rivera CNP   pantoprazole (PROTONIX) 40 MG tablet Take 1 tablet by mouth 2 times daily 10/1/19  Yes Carlton Bassett MD   clopidogrel (PLAVIX) 75 MG tablet TAKE 1 TABLET BY MOUTH EVERY DAY 9/3/19  Yes TRAY Shook CNP   magnesium oxide (MAG-OX) 400 (240 Mg) MG tablet TAKE 1 TABLET ONCE DAILY 5/1/19  Yes Harry Zee MD   busPIRone (BUSPAR) 30 MG tablet Take 30 mg by mouth 2 times daily  4/2/18  Yes Historical Provider, MD   aspirin EC 81 MG EC tablet Take 1 tablet by mouth daily 1/8/16  Yes Asif Eastman MD ARIPiprazole (ABILIFY) 15 MG tablet Take 15 mg by mouth daily    Yes Historical Provider, MD   lamoTRIgine (LAMICTAL) 200 MG tablet Take 200 mg by mouth 2 times daily    Yes Historical Provider, MD        Allergies:  Lisinopril     Physical Examination:    Vitals:    02/22/21 0912   BP: 100/60   Pulse: 110   Temp: 97.1 °F (36.2 °C)   SpO2: 100%   Weight: 202 lb 6.4 oz (91.8 kg)   Height: 5' 4\" (1.626 m)        Constitutional and General Appearance: Warm and dry, no apparent distress, normal coloration  HEENT:  Normocephalic, atraumatic  Respiratory:  · Normal excursion and expansion without use of accessory muscles  · Resp Auscultation: Clear to auscultation though diminished  Cardiovascular:  · The apical impulses not displaced  · Heart tones are crisp and normal  · JVP <8 cm H2O  · Regular rate and rhythm, normal S1S2, no m/g/r  · Peripheral pulses are symmetrical and full  · There is no clubbing, cyanosis of the extremities.   · 1+ BLE, R >L edema  · Pedal Pulses: 1+ and equal   Abdomen:  · No masses or tenderness, palpable mass mid abdomen  · Liver/Spleen: No Abnormalities Noted  Neurological/Psychiatric:  · Alert and oriented in all spheres  · Moves all extremities well  · Exhibits normal gait balance and coordination  · No abnormalities of mood, affect, memory, mentation, or behavior are noted    Lab Data:  Most recent lab results below reviewed in office    CBC:   Lab Results   Component Value Date    WBC 7.0 01/12/2021    WBC 7.3 01/11/2021    WBC 11.0 12/22/2020    RBC 4.07 01/12/2021    RBC 4.42 01/11/2021    RBC 5.13 12/22/2020    HGB 12.3 01/12/2021    HGB 13.1 01/11/2021    HGB 14.9 12/22/2020    HCT 37.4 01/12/2021    HCT 40.3 01/11/2021    HCT 45.0 12/22/2020    MCV 92.0 01/12/2021    MCV 91.1 01/11/2021    MCV 87.8 12/22/2020    RDW 17.1 01/12/2021    RDW 17.4 01/11/2021    RDW 15.6 12/22/2020     01/12/2021     01/11/2021     12/22/2020     BMP:  Lab Results Component Value Date     01/14/2021     01/13/2021     01/12/2021    K 3.2 01/14/2021    K 4.2 01/13/2021    K 4.3 01/12/2021    K 5.0 01/11/2021    K 3.6 12/22/2020    K 2.9 12/21/2020    CL 89 01/14/2021    CL 91 01/13/2021    CL 93 01/12/2021    CO2 29 01/14/2021    CO2 29 01/13/2021    CO2 28 01/12/2021    PHOS 4.0 01/14/2021    PHOS 4.4 01/13/2021    PHOS 3.1 12/21/2020    BUN 48 01/14/2021    BUN 49 01/13/2021    BUN 47 01/12/2021    CREATININE 2.2 01/14/2021    CREATININE 2.2 01/13/2021    CREATININE 2.3 01/12/2021     BNP:   Lab Results   Component Value Date    PROBNP 4,517 01/14/2021    PROBNP 3,512 01/11/2021    PROBNP 924 12/20/2020     LIPID:   Lab Results   Component Value Date    TRIG 123 01/12/2021    TRIG 78 01/27/2020    HDL 39 01/12/2021    HDL 21 01/27/2020    LDLCALC 70 01/12/2021    LDLCALC 21 01/27/2020    LDLDIRECT 187 08/01/2017       Cardiac Imaging:  VL BILAT VENOUS U/S 1/13/21  Within the limits of this exam, there is no evidence of deep or superficial    venous thrombosis in the lower extremities bilaterally.    Occluded arterial stent involving the ostial/proximal segment of the left    superficial femoral artery with monophasic dampened flow returning just    beyond the stent at the proximal segment of the thigh. VASCULAR PROCEDURE:   DATE OF PROCEDURE:  09/01/2020   PREOPERATIVE DIAGNOSES:  Left subclavian artery stenosis with coronary steal syndrome.   POSTOPERATIVE DIAGNOSES:  Left subclavian artery stenosis with coronary steal syndrome.   PROCEDURES PERFORMED:  1. Selective left subclavian angiogram.  2.  Placement of catheter in the subclavian artery. 3.  Angioplasty and stenting of the subclavian artery.   4.  Left upper extremity angiogram.       CARDIAC CATH 8/27/20:   FINDINGS   HEMODYNAMICS   CHAMBER PRESSURE SATURATION   RA  15  54 %   RV  62/16     PA  69/20  51 %   PW  19 with V waves to 25     AORTA  118/63  94 %     HERMILA CARDIAC OUTPUT  5.4 L/min   SVR  1075 L/min   PVR  343 L/min      Left subclavian angiogram shows proximal 95 to 99% stenosis.  There appears to be retrograde filling of the left subclavian artery by way of the LIMA and there appears to be coronary steal.  The vertebral arteries not well visualized.     Left heart catheterization   LVEDP  22   GRADIENT ACROSS AORTIC VALVE  none      CORONARY ARTERIES   LM  Proximal less than 10% stenosis, mid 10 to 20% stenosis, distal 70% stenosis.     Overall similar appearance to prior angiograms         LAD  Ostial 70% stenosis, the LAD is extensively stented in the proximal and mid segments, the stents appear to be widely patent with 20% in-stent restenosis, distal vessel has less than 10% stenosis.  There is retrograde filling into the LIMA to LAD graft that fills the left subclavian artery.     D1 is a small vessel with ostial 90% stenosis.     Overall similar appearance to prior angiograms         LCX  Circumflex is stented throughout the proximal and mid and distal segments into OM 1 which is a large vessel.  Stents are widely patent with 10 to 20% in-stent restenosis.  The stents jailed the AV groove circumflex and at the mid AV groove circumflex, there is a 70 to 80% stenosis.           RCA Dominant, medium vessel, heavily calcified, proximalmid 60 to 70% stenosis, distal less than 10% stenosis, overall similar angiographically as compared to prior angiograms.      BYPASS GRAFTS   LIMA-LAD  Visualized nonselectively through retrograde filling from the native LAD, it is not visualized with antegrade flow from the left subclavian artery injection it appears to be widely patent with less than 10% proximalmiddistal stenosis.      CONCLUSIONS:   Elevated filling pressures, continue diuresis  CardioMEMS had good correlation with the right heart catheterization numbers.   Severe pulmonary hypertension Patent CORNEJO to LAD graft however there appears to be coronary steal phenomenon due to severe left subclavian stenosis  We will consider left subclavian intervention  Will obtain follow-up vascular ultrasound to assess this  We will consult with CT surgery regarding therapeutic options       STRESS MPI 8/22/20:    Summary    Calculated LVEF is not reliable on study which is technically difficult, estimate of 51% is likely an overestimate     Mid-distal anteroseptal/apical akinesis    Large fixed defects in anteroseptal/apical walls    Mild to moderate reversibility in lateral walls consistent with ischemia    Overall, this would be considered an abnormal high risk study     URI 1/24/20:    Summary   Ejection fraction is visually estimated at 35-40%. Moderate to severe mitral regurgitation with multiple jets visualized. ERO estimated at 0.29 cm2. Mild tricuspid regurgitation. The left atrial appendage shows evidence of thrombus formation and low flow velocities. Moderate amount of plaque in the aorta    Surgery: 1/27/20 Urgent coronary artery bypass grafting surgery x1 with pedicled left internal mammary artery to the LAD. Cardiopulmonary bypass with on-pump beating-heart technique, no cross-clamp. Transesophageal echo. Epiaortic ultrasound. Woodland-Jurgen catheter placement. Doppler verification of grafts. Bilateral five-level intercostal nerve block with Exparel. Platelet gel application. Sternal plating. Vas-Cath placement.      CARDIAC CATH 1/20/2020:   Left Heart Cath  Dominance : Right     LM: 70-80% short distal stenosis     LAD: 70-80% short ostial stenosis, extending into takeoff of high first diagonal; large proximal to mid stented segment patent with jailed second diagonal  (80% ostial D2) and 70% in stent restenosis of most distal stent; distal to near apical LAD with minimal disease     LCx: 70-80% short ostial stenosis, prior to patent proximal to mid stents     RCA: large, dominant vessel with long 60% proximal to mid stenosis      LVEDP: 4 mmHG  LVG not done due to CKD.     Impression/Recommendations:  Diabetic with previous LAD and LCx stenting in 2018 returns with unstable angina, in stent restenosis and Left Main bifurcation disease. Recommend CTS evaluation inpatient to discuss surgical revascularization option. Hold Clopidogrel. CT Abd/ Pelvis 12/25/19:   FINDINGS: Lower Chest: Lung bases are grossly clear. Small hiatal hernia. Left lower lobe calcified granuloma. Organs: Noncontrast appearance of the liver, spleen, adrenal glands, and pancreas unremarkable. Pneumobilia identified within liver may be related to previous cholecystectomy or other biliary procedure. Kidneys symmetric in size. Left renal hilar calcifications are favored to be vascular. GI/Bowel: Moderate to large amount retained stool throughout the colon. Appendix is unremarkable. No evidence of bowel obstruction. Pelvis: Bladder is mildly distended. Uterus unremarkable. No adnexal mass. Peritoneum/Retroperitoneum: Moderate severe aortic vascular calcifications. Aorta is nonaneurysmal.  No free air or free fluid. Bones/Soft Tissues: Severe multilevel facet arthropathy     BLE ZACH's 4/18/19:   1. There is severe arterial insufficiency at rest involving the right lower    extremity. There occlusive disease of the right proximal superficial femoral    (noted stent) and mid posterior tibial arteries. There is a 50-74% stenosis    at 4the right deep femoral artery with evidence of inflow disease.    2. There is severe arterial insufficiency at rest involving the left lower    extremity. There is occlusive disease of the left proximal superficial    femoral artery (noted stent). There is a 30-49% stenosis at the left deep    femoral artery with evidence of inflow disease.         ECHO 4/3/19:   April Carina systolic function is mildly reduced with EF estimated at 40-45%.  There is severe HK of the apex and apical-septal segment.   Normal left ventricular diastolic filling pressure.   Mild mitral regurgitation.   Systolic pulmonary artery pressure (SPAP) estimated at 32mmHg (RA pressure 3mmHg). Arterial doppler studies 8/2/18:   Nadir Foreman is no arterial insufficiency at rest involving the lower extremities    bilaterally, however, there is evidence to suggest calf vessel disease    bilaterally. [facility] 3/26/18 [employer] promus premier [id number] CCx and [id number] CCx. Echo: 5/23/18:    Ejection fraction is visually estimated to be 30-35 %. There is akinesis of the apex and inferior walls. Left ventricular size is mildly increased. Moderate mitral regurgitation. Moderate amount plaque is noted in the aorta. The left atrium is mildly dilated. 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 A bubble study was performed and fails to show evidence of shunting. Procedure performed: Transesophageal echocardiogram 5/25/18:    Findings:  Reduced left ventricular function with ejection fraction estimated at about 35% with apical and inferior akinesis    The cardiac valves show moderate mitral regurgitation  There is no evidence for an intracardiac shunt or intracardiac thrombus. Cardiac cath 5/25/18:   Procedure Performed:  1. Coronary angiogram  2. Left heart cath  3. Left ventriculogram  4. Right heart cath   Findings:  1. Patent LAD and CIRC stents              ~mild CAD  2. Reduced LVEF 30% with anterior and apical akinesis  3. Moderate pulmonary hyperension   Conclusion/plan of care:  1. Medical management    Assessment:    1. Coronary artery disease involving native coronary artery of native heart with angina pectoris ([address]    2. S/P CABG (coronary artery bypass graft)    3. Ischemic cardiomyopathy    4. Chronic systolic heart failure ([address]    5. Mixed hyperlipidemia    6. Hypokalemia    7.  Mitral valve insufficiency, unspecified etiology 8. Stage 3b chronic kidney disease    9. PVD (peripheral vascular disease) with claudication ([facility]    10. Type 2 diabetes mellitus with stage 3b chronic kidney disease, with long-term current use of insulin ([facility] [address]    11. CLINT (obstructive sleep apnea)    12. Tobacco smoker          Plan:   1. No change in heart medicines  2. Blood work on Wednesday when at hospital for abdominal ultrasound  3. Try to transmit CardioMEMs and call the number on case if need assistance or new pillow  4.  Follow up with me in 2 months       I appreciate the opportunity of cooperating in the care of this individual.    TRAY Sullivan - CNP, 2/22/2021, 9:27 AM

## 2021-02-22 NOTE — PATIENT INSTRUCTIONS
1. No change in heart medicines  2. Blood work on Wednesday when at hospital for abdominal ultrasound  3. Try to transmit CardioMEMs and call the number on case if need assistance or new pillow  4.  Follow up with me in 2 months

## 2021-03-15 NOTE — TELEPHONE ENCOUNTER
Spoke with Biju Donahue, relayed message. Pt v/u and states she will contact PCP. I called PCP to let her know labs are abnormal and fax is being sent. register took message and gave Dr. Rafy Frazier a heads up on the fax coming their way.

## 2021-03-15 NOTE — TELEPHONE ENCOUNTER
Glucose 693!!! Please call patient right away and I make her aware. This is causing other electrolyte disturbance. Liver function tests are also abnormal although stable from prior testing. Blood count stable. Sed rate is elevated. I did not order all of these testing only the BMP and BNP (not resulted). Please forward to PCP or other provider that may have ordered these tests.   Thank you, Dahiana Snider

## 2021-03-15 NOTE — TELEPHONE ENCOUNTER
----- Message from TRAY Guevara CNP sent at 3/15/2021  2:08 PM EDT -----  Glucose 693!!! Please call patient right away and I make her aware. This is causing other electrolyte disturbance. Liver function tests are also abnormal although stable from prior testing. Blood count stable. Sed rate is elevated. I did not order all of these testing only the BMP and BNP (not resulted). Please forward to PCP or other provider that may have ordered these tests.   Thank you, Kelli Weinstein

## 2021-03-16 NOTE — ED PROVIDER NOTES
[address] ED  eMERGENCY dEPARTMENT eNCOUnter      Pt Name: Shalonda Moon  MRN: [id number]  [place] 1965  Date of evaluation: 3/16/2021  Provider: Tamia Hassan MD    CHIEF COMPLAINT       Chief Complaint   Patient presents with    Other     per ems pt had blood work yesterday, was called today and told that her sodium was low, potassium was high, and glucose was high          HISTORY OF PRESENT ILLNESS   (Location/Symptom, Timing/Onset, Context/Setting, Quality, Duration, Modifying Factors, Severity)  Note limiting factors. Shalonda Moon is a 60 y.o. female with complicated past medical history including diabetes, CHF, CAD, and frequent admissions for metabolic emergencies who presents due to to having an outpatient lab draw yesterday which showed hyponatremia hyperkalemia and hyperglycemia. Patient reports that she does have some right shoulder pain for the past 1 to 2 weeks but otherwise feels like she has at her baseline except for possibly increased fatigue. She denies any fever, nausea, vomiting, diarrhea. Reports her symptoms are moderate, constant, and unchanged. She denies any chest pain or shortness of breath. She denies any trauma. HPI    Nursing Notes were reviewed. REVIEW OFSYSTEMS    (2-9 systems for level 4, 10 or more for level 5)     Review of Systems   Constitutional: Positive for fatigue. Negative for appetite change, fever and unexpected weight change. HENT: Negative for facial swelling, trouble swallowing and voice change. Eyes: Negative for visual disturbance. Respiratory: Negative for shortness of breath, wheezing and stridor. Cardiovascular: Negative for chest pain and palpitations. Gastrointestinal: Negative for abdominal pain, nausea and vomiting. Genitourinary: Negative for dysuria and vaginal bleeding. Musculoskeletal: Positive for arthralgias. Negative for neck pain and neck stiffness. Skin: Negative for color change and wound. Neurological: Negative for seizures and syncope. Psychiatric/Behavioral: Negative for self-injury and suicidal ideas. Except as noted above the remainder of the review of systems was reviewed and negative.        PAST MEDICAL HISTORY     Past Medical History:   Diagnosis Date    Acute blood loss anemia 8/5/2020    Acute kidney injury ([address] 12/25/2019    Acute kidney injury superimposed on CKD ([address] 8/5/2020    Acute on chronic combined systolic and diastolic congestive heart failure ([address] 1/10/2020    Acute on chronic right-sided heart failure ([address] 08/2020    Alcohol dependence ([address] 3/10/2010    Arthritis     Asthma     CAD (coronary artery disease)     Cellulitis 6/3/2020    COPD (chronic obstructive pulmonary disease) (HCC)     Diabetic ulcer of left foot associated with type 2 diabetes mellitus, limited to breakdown of skin ([address] 1/18/2020    Diabetic ulcer of toe of right foot associated with type 2 diabetes mellitus ([address] 1/18/2020    Left renal artery stenosis ([address] 08/2020    MI (myocardial infarction) ([address] 10/07/2019    NSTEMI    Positive FIT (fecal immunochemical test) 2/28/2020    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 Joanna Saucedo - at [facility]  01/20/2020    Dr. Governor Mcarthur      pancreatitis post surgery    CORONARY ANGIOPLASTY WITH STENT PLACEMENT  03/16/2018    MELODY- 3.5 x 38 and 3.5 x 20 to Cx    CORONARY ANGIOPLASTY WITH STENT PLACEMENT  01/03/2018    MELODY- 3.0 x 38 and 2.75 x 26 and 2.75 x 8 and 2.75 x 22 to the LAD    CORONARY ANGIOPLASTY WITH STENT PLACEMENT  10/07/2019 MELODY- 2.5 x 8 to [address] GRAFT N/A 1/27/2020    CORONARY ARTERY BYPASS GRAFTING X 1, ON PUMP BEATING HEART, INTERNAL MAMMARY ARTERY TO LEFT ANTERIOR DESCENDING ARTERY, VAS CATH INSERTION, URI, PLATELET GEL APPLICATION, 5 LEVEL BILATERAL INTERCOSTAL NERVE BLOCK, STERNAL PLATING performed by Simeon Tomlin MD at [address] Right 5/16/2019    fem-pop, performed by Pavan Miller MD at [address]  02/14/2019    CardioMEMs insertion for CHF    ROTATOR CUFF REPAIR Left 04/22/2016    TONSILLECTOMY      TRANSESOPHAGEAL ECHOCARDIOGRAM  01/26/2020    TRANSLUMINAL ANGIOPLASTY Left 10/08/2019    with stenting, at [facility]    TRANSLUMINAL ANGIOPLASTY Left 04/29/2020    of the [facility] re-occlusion    TUMOR EXCISION      benign tumors X 2 chest & axilla    UPPER GASTROINTESTINAL ENDOSCOPY  4/25/2013    BX BAYLEE dunne, gastritis    UPPER GASTROINTESTINAL ENDOSCOPY  12/10/2015    UPPER GASTROINTESTINAL ENDOSCOPY  01/06/2017    Gastritis    VASCULAR SURGERY Left 12/08/2015    upper extremity and mesenteric angiogram (diagnostic only)    VASCULAR SURGERY Bilateral 07/07/2020    diagnostic lower extremity angiogram only    VASCULAR SURGERY Left 08/04/2020    angioplasty and stent of renal artery    VASCULAR SURGERY Left 08/05/2020    coil embolization of branch renal artery vessel for bleeding    VASCULAR SURGERY Left 09/01/2020    angioplasty and stenting of subclavian artery         CURRENT MEDICATIONS       Previous Medications    ALBUTEROL SULFATE  (90 BASE) MCG/ACT INHALER    Inhale 2 puffs into the lungs every 6 hours as needed for Wheezing or Shortness of Breath    ARIPIPRAZOLE (ABILIFY) 15 MG TABLET    Take 15 mg by mouth daily     ASPIRIN EC 81 MG EC TABLET    Take 1 tablet by mouth daily    ATORVASTATIN (LIPITOR) 80 MG TABLET    TAKE 1 TABLET BY MOUTH EVERY DAY AT NIGHT    BENZTROPINE (COGENTIN) 1 MG TABLET    Take 1 mg by mouth nightly BLOOD GLUCOSE MONITORING SUPPL (FREESTYLE FREEDOM LITE) W/DEVICE KIT    Use to test glucose 4 times a day    BLOOD GLUCOSE TEST STRIPS (FREESTYLE LITE) STRIP    USE TO CHECK BLOOD GLUCOSE LEVELS FOUR TIMES DAILY    BUPRENORPHINE-NALOXONE (SUBOXONE) 8-2 MG FILM SL FILM    Place 1 Film under the tongue 2 times daily. BUSPIRONE (BUSPAR) 30 MG TABLET    Take 30 mg by mouth 2 times daily     CARVEDILOL (COREG) 3.125 MG TABLET    TAKE 1 TABLET BY MOUTH TWICE A DAY WITH MEALS    CLOPIDOGREL (PLAVIX) 75 MG TABLET    TAKE 1 TABLET BY MOUTH EVERY DAY    DOXEPIN (SINEQUAN) 25 MG CAPSULE    TAKE 1 CAPSULE BY MOUTH EVERY DAY AT NIGHT    INSULIN DEGLUDEC (TRESIBA FLEXTOUCH) 100 UNIT/ML SOPN    Inject 30 Units into the skin nightly    INSULIN LISPRO (HUMALOG KWIKPEN U-200) 200 UNIT/ML SOPN PEN    Inject 10 Units into the skin 3 times daily (before meals)    INSULIN PEN NEEDLE (B-D ULTRAFINE III SHORT PEN) 31G X 8 MM MISC    Five shots a day    INSULIN SYRINGE .5CC/29G 29G X 1/2\" 0.5 ML MISC    1 each by Does not apply route daily    KLOR-CON M20 20 MEQ EXTENDED RELEASE TABLET    TAKE 2 TABLETS BY MOUTH 4 TIMES DAILY    LAMOTRIGINE (LAMICTAL) 200 MG TABLET    Take 200 mg by mouth 2 times daily     MAGNESIUM OXIDE (MAG-OX) 400 (240 MG) MG TABLET    TAKE 1 TABLET ONCE DAILY    METHOCARBAMOL (ROBAXIN) 500 MG TABLET    Take 500 mg by mouth 2 times daily    METOLAZONE (ZAROXOLYN) 5 MG TABLET    Take 1 tablet by mouth three times a week On Mon Weds and Friday    MIDODRINE (PROAMATINE) 5 MG TABLET    Take 5 mg by mouth 2 times daily    NITROGLYCERIN (NITROSTAT) 0.4 MG SL TABLET    Place 1 tablet under the tongue every 5 minutes as needed for Chest pain up to max of 3 total doses. If no relief after 1 dose, call 911.     PANTOPRAZOLE (PROTONIX) 40 MG TABLET    Take 1 tablet by mouth 2 times daily    SPIRONOLACTONE (ALDACTONE) 50 MG TABLET    Take 50 mg by mouth daily    TIOTROPIUM (SPIRIVA RESPIMAT) 2.5 MCG/ACT AERS INHALER    INHALE 2 PUFFS INTO THE LUNGS DAILY    TORSEMIDE (DEMADEX) 100 MG TABLET    Take 1 tablet by mouth daily    TRAZODONE (DESYREL) 100 MG TABLET    Take 100 mg by mouth nightly as needed for Sleep Can take 1 or 2 tabs nightly as directed       ALLERGIES     Lisinopril    FAMILY HISTORY       Family History   Problem Relation Age of Onset    Heart Disease Maternal Grandmother     Cancer Mother         lung and brain cancer    Cancer Maternal Aunt         lung and brain cancer          SOCIAL HISTORY       Social History     Socioeconomic History    Marital status: Single     Spouse name: None    Number of children: None    Years of education: None    Highest education level: None   Occupational History    None   Social Needs    Financial resource strain: None    Food insecurity     Worry: None     Inability: None    Transportation needs     Medical: None     Non-medical: None   Tobacco Use    Smoking status: Current Every Day Smoker     Packs/day: 0.50     Years: 30.00     Pack years: 15.00     Types: Cigarettes    Smokeless tobacco: Never Used    Tobacco comment: 0.5ppd as of 2/22/21   Substance and Sexual Activity    Alcohol use: No     Comment: quit 2006     Drug use: Never    Sexual activity: Yes     Partners: Male   Lifestyle    Physical activity     Days per week: None     Minutes per session: None    Stress: None   Relationships    Social connections     Talks on phone: None     Gets together: None     Attends [employer] service: None     Active member of club or organization: None     Attends meetings of clubs or organizations: None     Relationship status: None    Intimate partner violence     Fear of current or ex partner: None     Emotionally abused: None     Physically abused: None     Forced sexual activity: None   Other Topics Concern    None   Social History Narrative    None         PHYSICAL EXAM    (up to 7 for level 4, 8 or more for level 5)     ED Triage Vitals [03/16/21 1115]   BP Temp Temp Source Pulse Resp SpO2 Height Weight   91/63 97.6 °F (36.4 °C) Oral 88 21 98 % 5' 4.5\" (1.638 m) 225 lb (102.1 kg)       Physical Exam  Vitals signs and nursing note reviewed. Constitutional:       Appearance: She is well-developed. Comments: Chronically ill-appearing obese  female in no acute distress. HENT:      Head: Normocephalic and atraumatic. Right Ear: External ear normal.      Left Ear: External ear normal.   Eyes:      Conjunctiva/sclera: Conjunctivae normal.   Neck:      Musculoskeletal: Neck supple. Vascular: No JVD. Trachea: No tracheal deviation. Cardiovascular:      Rate and Rhythm: Normal rate. Pulmonary:      Effort: Pulmonary effort is normal. No respiratory distress. Breath sounds: Normal breath sounds. No wheezing. Abdominal:      General: There is no distension. Palpations: Abdomen is soft. Tenderness: There is no abdominal tenderness. There is no guarding or rebound. Musculoskeletal: Normal range of motion. General: Tenderness present. No deformity. Comments: Mild right anterior shoulder tenderness completely reproducible to palpation. No redness, swelling, joint effusion. No visual abnormality. Full passive range of motion of the shoulder joint. Skin:     General: Skin is warm and dry. Neurological:      Comments: Patient is alert and oriented. Slow speech. Sensation intact in all 4 extremities. DIAGNOSTIC RESULTS     EKG:All EKG's are interpreted by the Emergency Department Physician who either signs or Co-signs this chart in the absence of a cardiologist.    The Ekg interpreted by me shows  normal sinus rhythm with a rate of 85  Axis is   Left axis deviation  QTc is  533ms  Intervals and Durations are unremarkable.       ST Segments: no acute change  No significant change from prior EKG dated 1/11/21    RADIOLOGY:     Interpretation per the Radiologist below, if available at the time of this note:    XR SHOULDER RIGHT (MIN 2 VIEWS)   Final Result   No acute osseous abnormality.                ED BEDSIDE ULTRASOUND:   Performed by ED Physician - none    LABS:  Labs Reviewed   COMPREHENSIVE METABOLIC PANEL W/ REFLEX TO MG FOR LOW K - Abnormal; Notable for the following components:       Result Value    Sodium 122 (*)     Potassium reflex Magnesium 3.3 (*)     Chloride 76 (*)     CO2 37 (*)     Glucose 493 (*)     BUN 63 (*)     CREATININE 2.5 (*)     GFR Non- 20 (*)     GFR African American 24 (*)     Albumin/Globulin Ratio 0.9 (*)     Alkaline Phosphatase 191 (*)     ALT 8 (*)     AST 10 (*)     All other components within normal limits    Narrative:     Performed at:  [facility],  [employer]   Phone [phone]   BLOOD GAS, VENOUS - Abnormal; Notable for the following components:    pCO2, Christopher 58.3 (*)     pO2, Christopher 46.7 (*)     HCO3, Venous 36.5 (*)     Base Excess, Christopher 9.7 (*)     Carboxyhemoglobin 3.5 (*)     All other components within normal limits    Narrative:     Performed at:  [facility]) - [facility],  [employer], [facility]   Phone [phone]   URINE RT REFLEX TO CULTURE - Abnormal; Notable for the following components:    Glucose, Ur >=1000 (*)     All other components within normal limits    Narrative:     Performed at:  [facility]) - [facility],  [employer], [employer]   Phone [phone]   BRAIN NATRIURETIC PEPTIDE - Abnormal; Notable for the following components:    Pro-BNP 1,584 (*)     All other components within normal limits    Narrative:     Performed at:  [facility]) - [facility],  [employer], [employer]   Phone [phone]   TROPONIN - Abnormal; Notable for the following components:    Troponin 0.03 (*)     All other components within normal limits    Narrative:     Performed at:  [address] [place] [address] [address]  [place], [employer]   Phone [phone]   POCT GLUCOSE - Abnormal; Notable for the following components:    POC Glucose 464 (*)     All other components within normal limits    Narrative:     Performed at:  [facility],  [place], [employer]   Phone [phone]   POCT GLUCOSE - Abnormal; Notable for the following components:    POC Glucose 375 (*)     All other components within normal limits    Narrative:     Performed at:  [facility],  [place], [employer]   Phone [phone]   POCT GLUCOSE - Normal   CBC WITH AUTO DIFFERENTIAL    Narrative:     Performed at:  [facility],  [place], [employer]   Phone [phone]   MAGNESIUM    Narrative:     Performed at:  [facility]) [facility],  [place], [employer]   Phone [phone]   POCT GLUCOSE       All otherlabs were within normal range or not returned as of this dictation. EMERGENCY DEPARTMENT COURSE and DIFFERENTIAL DIAGNOSIS/MDM:   Vitals:    Vitals:    03/16/21 1415 03/16/21 1417 03/16/21 1507 03/16/21 1628   BP: (!) 82/57 96/61 105/62 95/73   Pulse: 91 90 83 80   Resp: 27 21 24 16   Temp:       TempSrc:       SpO2:  92% 91% 96%   Weight:       Height:             MDM  Reports the only reason she came to emergency department is because she was called and informed of high sugar and other electrolyte abnormalities. She does report mild shoulder pain worse with movement denies any other complaints at this time. Shoulder x-ray is unremarkable and patient is neurovascularly intact. No signs of acute ACS with unchanged EKG and undetectable troponin as well as patient denying any exertional symptoms or diaphoresis. Patient is a very chronically ill but does not appear acutely ill.   Her triage blood pressure is mildly hypotensive after fluids she improves into normal range. She is tolerating p.o. well drinking 2 glasses of water in the emergency department without any difficulty. Her potassium is low and this was replaced during her stay in emergency permit. The patient is hyperglycemic but is not in DKA. She is given insulin with improvement in her sugar on recheck. I discussed different options with the patient including admission for further IV fluids, further treatment emergency permit, discharged home and the patient states she strongly prefers to be discharged home at this time. I have contacted the patient's primary care office and spoken to the nurse practitioner on call, SARAH Hearn and reviewed the case with her. She reports that they can get the patient in later this week for repeat of blood work and further evaluation. I have reviewed this plan with the patient who states that she will follow up with her primary care nurse practitioner later this week and states she feels much better after IV fluids. Strict ER return precautions were again reviewed and the importance of taking her medications as prescribed are discussed. The patient expresses understanding and agreement with this plan and the patient is discharged home. CONSULTS:  IP CONSULT TO PRIMARY CARE PROVIDER         Procedures    FINAL IMPRESSION      1. Hyperglycemia    2.  Hyponatremia          DISPOSITION/PLAN   DISPOSITION Decision To Discharge 03/16/2021 04:02:20 PM      PATIENT REFERRED TO:  Emre Shi PA-C    In 3 days      [facility] ED  [address] [facility]  [phone]    If symptoms worsen      DISCHARGE MEDICATIONS:  New Prescriptions    POTASSIUM CHLORIDE (KLOR-CON M) 20 MEQ EXTENDED RELEASE TABLET    Take 1 tablet by mouth daily          (Please note that portions of this note were completed with a voice recognition program.  Efforts were made to edit the dictations but occasionally words aremis-transcribed. )    Karina Roland MD (electronically signed)  Attending Emergency Physician           Karina Roland MD  03/16/21 0169

## 2021-03-16 NOTE — ED NOTES
Pt discharged to home. Pt A&O x4 at discharge and was assisted to family members via wheelchair. Pt discharge instructions were discussed with prescriptions. Pt verbalized understanding and all questions were answered at this time.       Gennie Apley, RN  03/16/21 4905

## 2021-03-16 NOTE — Clinical Note
[address]  [address]  Authorization for Surgical Operation and Procedure     Date:___________                                                                                                         Time:__________  I hereby authorize Surgeon(s):  Tim Alexandre MD, my physician and his/her assistants (if applicable), which may include medical students, residents, and/or fellows, to perform the following surgical operation/ procedure and administer such anesthesia as may be determined necessary by my physician:  Operation/Procedure name (s) Procedure(s):  ESOPHAGOGASTRODUODENOSCOPY (EGD) on Norm Roque   2.   I recognize that during the surgical operation/procedure, unforeseen conditions may necessitate additional or different procedures than those listed above.  I, therefore, further authorize and request that the above-named surgeon, assistants, or designees perform such procedures as are, in their judgment, necessary and desirable.    3.   My surgeon/physician has discussed prior to my surgery the potential benefits, risks and side effects of this procedure; the likelihood of achieving goals; and potential problems that might occur during recuperation.  They also discussed reasonable alternatives to the procedure, including risks, benefits, and side effects related to the alternatives and risks related to not receiving this procedure.  I have had all my questions answered and I acknowledge that no guarantee has been made as to the result that may be obtained.    4.   Should the need arise during my operation/procedure, which includes change of level of care prior to discharge, I also consent to the administration of blood and/or blood products.  Further, I understand that despite careful testing and screening of blood or blood products by collecting agencies, I may still be subject to ill effects as a result of receiving a blood transfusion and/or blood products.   Pending pick-up The following are some, but not all, of the potential risks that can occur: fever and allergic reactions, hemolytic reactions, transmission of diseases such as Hepatitis, AIDS and Cytomegalovirus (CMV) and fluid overload.  In the event that I wish to have an autologous transfusion of my own blood, or a directed donor transfusion, I will discuss this with my physician.  Check only if Refusing Blood or Blood Products  I understand refusal of blood or blood products as deemed necessary by my physician may have serious consequences to my condition to include possible death. I hereby assume responsibility for my refusal and release the hospital, its personnel, and my physicians from any responsibility for the consequences of my refusal.          o  Refuse      5.   I authorize the use of any specimen, organs, tissues, body parts or foreign objects that may be removed from my body during the operation/procedure for diagnosis, research or teaching purposes and their subsequent disposal by hospital authorities.  I also authorize the release of specimen test results and/or written reports to my treating physician on the hospital medical staff or other referring or consulting physicians involved in my care, at the discretion of the Pathologist or my treating physician.    6.   I consent to the photographing or videotaping of the operations or procedures to be performed, including appropriate portions of my body for medical, scientific, or educational purposes, provided my identity is not revealed by the pictures or by descriptive texts accompanying them.  If the procedure has been photographed/videotaped, the surgeon will obtain the original picture, image, videotape or CD.  The hospital will not be responsible for storage, release or maintenance of the picture, image, tape or CD.    7.   I consent to the presence of a  or observers in the operating room as deemed necessary by my physician or their  designees.    8.   I recognize that in the event my procedure results in extended X-Ray/fluoroscopy time, I may develop a skin reaction.    9. If I have a Do Not Attempt Resuscitation (DNAR) order in place, that status will be suspended while in the operating room, procedural suite, and during the recovery period unless otherwise explicitly stated by me (or a person authorized to consent on my behalf). The surgeon or my attending physician will determine when the applicable recovery period ends for purposes of reinstating the DNAR order.  10. Patients having a sterilization procedure: I understand that if the procedure is successful the results will be permanent and it will therefore be impossible for me to inseminate, conceive, or bear children.  I also understand that the procedure is intended to result in sterility, although the result has not been guaranteed.   11. I acknowledge that my physician has explained sedation/analgesia administration to me including the risk and benefits I consent to the administration of sedation/analgesia as may be necessary or desirable in the judgment of my physician.    I CERTIFY THAT I HAVE READ AND FULLY UNDERSTAND THE ABOVE CONSENT TO OPERATION and/or OTHER PROCEDURE.    _________________________________________  __________________________________  Signature of Patient     Signature of Responsible Person         ___________________________________         Printed Name of Responsible Person           _________________________________                 Relationship to Patient  _________________________________________  ______________________________  Signature of Witness          Date  Time      Patient Name: Norm Roque     : 3/25/2007                 Printed: 2025     Medical Record #: [id number]                     Page [address] of [address] [place] [address]  [id number]    Consent for Anesthesia    I,  Norm Roque agree to be cared for by an anesthesiologist, who is specially trained to monitor me and give me medicine to put me to sleep or keep me comfortable during my procedure    I understand that my anesthesiologist is not an employee or agent of [facility] or [employer]. He or she works for [employer].    As the patient asking for anesthesia services, I agree to:  Allow the anesthesiologist (anesthesia doctor) to give me medicine and do additional procedures as necessary. Some examples are: Starting or using an “IV” to give me medicine, fluids or blood during my procedure, and having a breathing tube placed to help me breathe when I’m asleep (intubation). In the event that my heart stops working properly, I understand that my anesthesiologist will make every effort to sustain my life, unless otherwise directed by [facility] Do Not Resuscitate documents.  Tell my anesthesia doctor before my procedure:  If I am pregnant.  The last time that I ate or drank.  All of the medicines I take (including prescriptions, herbal supplements, and pills I can buy without a prescription (including street drugs/illegal medications). Failure to inform my anesthesiologist about these medicines may increase my risk of anesthetic complications.  If I am allergic to anything or have had a reaction to anesthesia before.  I understand how the anesthesia medicine will help me (benefits).  I understand that with any type of anesthesia medicine there are risks:  The most common risks are: nausea, vomiting, sore throat, muscle soreness, damage to my eyes, mouth, or teeth (from breathing tube placement).  Rare risks include: remembering what happened during my procedure, allergic reactions to medications, injury to my airway, heart, lungs, vision, nerves, or muscles and in extremely rare instances death.  My doctor has explained to me other choices available to me for my care  (alternatives).  Pregnant Patients (“epidural”):  I understand that the risks of having an epidural (medicine given into my back to help control pain during labor), include itching, low blood pressure, difficulty urinating, headache or slowing of the baby’s heart. Very rare risks include infection, bleeding, seizure, irregular heart rhythms and nerve injury.  Regional Anesthesia (“spinal”, “epidural”, & “nerve blocks”):  I understand that rare but potential complications include headache, bleeding, infection, seizure, irregular heart rhythms, and nerve injury.    I can change my mind about having anesthesia services at any time before I get the medicine.    _____________________________________________________________________________  Patient (or Representative) Signature/Relationship to Patient  Date   Time    _____________________________________________________________________________   Name (if used)    Language/Organization   Time    _____________________________________________________________________________  Anesthesiologist Signature     Date   Time  I have discussed the procedure and information above with the patient (or patient’s representative) and answered their questions. The patient or their representative has agreed to have anesthesia services.    _____________________________________________________________________________  Witness        Date   Time  I have verified that the signature is that of the patient or patient’s representative, and that it was signed before the procedure  Patient Name: Norm Roque     : 3/25/2007                 Printed: 2025     Medical Record #: [id number]                     Page 2 of 2

## 2021-03-16 NOTE — ED NOTES
Second call to [facility] for Dr Sally Salas.  Provider is on the line during call @ [address] [place] [address]  03/16/21 3600

## 2021-03-21 NOTE — ED PROVIDER NOTES
[facility]    CDI Prediction Tool Protocol (Vancomycin Initiated)    OVP (oral vancomycin prophylaxis) 125 mg PO Daily is being started in this patient based on a score of 20.1.       Score Breakdown:  History of CDI > 1 year ago AND high risk an [facility] Emergency Department    CHIEF COMPLAINT  Shortness of Breath and Abnormal Lab (had blood work done at PCP office today, they called with results and sent patient to ER for high potassium and said that kidney function was \"critical\" \"they told me if i didnt come to the ER today i would need dialysis tomorrow\". )      HISTORY OF PRESENT ILLNESS  Katie Miles is a 62 y.o. female who presents to the ED complaining of abnormal labs. Patient dropped off by friend today for evaluation. Patient states that she has a history of chronic kidney disease. Does not appear to be followed by nephrologist however. Not on dialysis. Still producing urine. Reports that she has had some swelling. She is taking diuretics. Had some labs performed by her cardiologist on Friday. She was called today reporting critical labs and recommended immediate emergency room evaluation. Patient states that she denies headaches or confusion. Has had some dizziness upon standing and has noticed her blood pressures been running low. She denies any chest pain. Does feel mildly short of breath with exertion. No cough or congestion. No chest pain. She denies any fevers or chills. No abdominal pain or discomfort. No nausea, vomiting or diarrhea. She denies back pain. No urinary symptoms. Is currently undergoing antibiotic treatment for lower extremity cellulitis. No other complaints, modifying factors or associated symptoms. Nursing notes reviewed.    Past Medical History:   Diagnosis Date    Acute kidney injury ([address] 12/25/2019    Acute on chronic congestive heart failure (HCC)     Alcohol dependence ([address] 3/10/2010    Cellulitis 6/3/2020    Diabetic ulcer of left foot associated with type 2 diabetes mellitus ([address] 1/18/2020    Diabetic ulcer of toe of right foot associated with type 2 diabetes mellitus ([address] 1/18/2020    MI (myocardial infarction) ([address]     Positive FIT (fecal immunochemical test) 2/28/2020     Past Surgical History:   Procedure Laterality Date    ARTERIAL BYPASS SURGRY Left 01/06/2016    Axillary/Subclavian to Brachial Bypass w/reversed SVG    CARDIAC CATHETERIZATION  03/27/2018    Dr. Manjeet Christie - at [address] [place]  01/20/2020    Dr. Glynn Gauthier      pancreatitis post surgery    CORONARY ANGIOPLASTY WITH STENT PLACEMENT  03/16/2018    MELODY- 3.5 x 38 and 3.5 x 20 to Cx    CORONARY ANGIOPLASTY WITH STENT PLACEMENT  01/03/2018    MELODY- 3.0 x 38 and 2.75 x 26 and 2.75 x 8 and 2.75 x 22 to the LAD    CORONARY ARTERY BYPASS GRAFT N/A 1/27/2020    CORONARY ARTERY BYPASS GRAFTING X 1, ON PUMP BEATING HEART, INTERNAL MAMMARY ARTERY TO LEFT ANTERIOR DESCENDING ARTERY, VAS CATH INSERTION, URI, PLATELET GEL APPLICATION, 5 LEVEL BILATERAL INTERCOSTAL NERVE BLOCK, STERNAL PLATING performed by Fany Lyn MD at [address] Right 5/16/2019    fem-pop, performed by Pretty Muhammad MD at [address]  02/14/2019    CardioMEMs insertion for CHF    ROTATOR CUFF REPAIR Left 04/22/2016    TONSILLECTOMY      TRANSLUMINAL ANGIOPLASTY Left 10/08/2019    with stenting, at [facility]    TRANSLUMINAL ANGIOPLASTY Left 04/29/2020    of the [facility] re-occlusion    TUMOR EXCISION      benign tumors X 2 chest & axilla    UPPER GASTROINTESTINAL ENDOSCOPY  4/25/2013    BX barretts, HH, gastritis    UPPER GASTROINTESTINAL ENDOSCOPY  12/10/2015    UPPER GASTROINTESTINAL ENDOSCOPY  01/06/2017    Gastritis    VASCULAR SURGERY Left 12/08/2015    upper extremity and mesenteric angiogram (diagnostic only)     Family History   Problem Relation Age of Onset    Heart Disease Maternal Grandmother     Cancer Mother         lung and brain cancer    Cancer Maternal Aunt         lung and brain cancer     Social History     Socioeconomic History    Marital status: Single     Spouse name: Not on file    Number of children: Not on file    Years of education: Not on file    Highest education level: Not on file   Occupational History    Not on file   Social Needs    Financial resource strain: Not on file    Food insecurity     Worry: Not on file     Inability: Not on file    Transportation needs     Medical: Not on file     Non-medical: Not on file   Tobacco Use    Smoking status: Current Every Day Smoker     Packs/day: 1.00     Years: 30.00     Pack years: 30.00     Types: Cigarettes     Last attempt to quit: 2019     Years since quittin.9    Smokeless tobacco: Never Used    Tobacco comment: trying to quit, one cigarette daily   Substance and Sexual Activity    Alcohol use: No     Comment: quit      Drug use: Never    Sexual activity: Yes     Partners: Male   Lifestyle    Physical activity     Days per week: Not on file     Minutes per session: Not on file    Stress: Not on file   Relationships    Social connections     Talks on phone: Not on file     Gets together: Not on file     Attends Faith service: Not on file     Active member of club or organization: Not on file     Attends meetings of clubs or organizations: Not on file     Relationship status: Not on file    Intimate partner violence     Fear of current or ex partner: Not on file     Emotionally abused: Not on file     Physically abused: Not on file     Forced sexual activity: Not on file   Other Topics Concern    Not on file   Social History Narrative    Not on file     Current Facility-Administered Medications   Medication Dose Route Frequency Provider Last Rate Last Dose    aspirin EC tablet 81 mg  81 mg Oral Daily Melody Bautista MD   81 mg at 20 0853    atorvastatin (LIPITOR) tablet 80 mg  80 mg Oral Nightly Melody Bautista MD   80 mg at 20 0034    benztropine (COGENTIN) tablet 1 mg  1 mg Oral Nightly Melody Bautista MD   1 mg at 20 0034    ARIPiprazole (ABILIFY) tablet 10 mg  10 mg insulin lispro (HUMALOG) injection vial 0-6 Units  0-6 Units Subcutaneous TID  Loyd Lee MD        insulin lispro (HUMALOG) injection vial 0-3 Units  0-3 Units Subcutaneous Nightly Loyd Lee MD        midodrine (PROAMATINE) tablet 7.5 mg  7.5 mg Oral TID  Dickey Severance, MD   7.5 mg at 06/30/20 1206     Allergies   Allergen Reactions    Lisinopril Other (See Comments)     Severe hypotension       REVIEW OF SYSTEMS  10 systems reviewed, pertinent positives per HPI otherwise noted to be negative    PHYSICAL EXAM  BP 96/65   Pulse 76   Temp 97.8 °F (36.6 °C) (Oral)   Resp 18   Ht 5' 4\" (1.626 m)   Wt 247 lb 8 oz (112.3 kg)   LMP 10/24/2012   SpO2 98%   BMI 42.48 kg/m²   GENERAL APPEARANCE: Awake and alert. Cooperative. No acute distress. HEAD: Normocephalic. Atraumatic. EYES: PERRL. EOM's grossly intact. ENT: Mucous membranes are moist.   NECK: Supple. No JVD. No tracheal tenderness or deviation. No crepitus. No chest wall tenderness. HEART: RRR. No murmurs. LUNGS: Respirations unlabored. CTAB. Good air exchange. Speaking comfortably in full sentences. Rales at bases. ABDOMEN: Soft. Non-distended. Non-tender. No guarding or rebound. No midline pulsatile mass. EXTREMITIES: +2 pitting peripheral roshan bilaterally. There is erythema noted bilaterally. Open wounds with mild drainage noted to right. No areas of fluctuance or induration. No palpable cords or masses. . Moves all extremities equally. All extremities neurovascularly intact. SKIN: Warm and dry. No acute rashes. NEUROLOGICAL: Alert and oriented. CN's 2-12 intact. No gross facial drooping. Strength 5/5, sensation intact. PSYCHIATRIC: Normal mood and affect.     RADIOLOGY  Xr Chest Standard (2 Vw)    Result Date: 6/3/2020  EXAMINATION: TWO XRAY VIEWS OF THE CHEST 6/3/2020 7:17 pm COMPARISON: 03/20/2020 radiograph HISTORY: ORDERING SYSTEM PROVIDED HISTORY: [facility] TECHNOLOGIST PROVIDED HISTORY: Reason for exam:->rhonchi Reason for Exam: Leg Pain (Pt c/o leg pain x 1 week. Saw PCP who started her on Clindamycin last week. Saw for follow up today and sent to ED. States she has also had 12lb weight over past two weeks. ) Acuity: Unknown Type of Exam: Unknown FINDINGS: Heart is enlarged. Evidence of prior CABG. Mediastinum and pulmonary vascular markings are normal.  The lungs are clear. Postsurgical changes are stable in the left axilla. There are no significant skeletal findings     Stable pattern of cardiomegaly with no acute finding in the chest.     Xr Chest Portable    Result Date: 6/29/2020  EXAMINATION: ONE XRAY VIEW OF THE CHEST 6/29/2020 5:53 pm COMPARISON: Prior studies including 06/03/2020 HISTORY: ORDERING SYSTEM PROVIDED HISTORY: [facility] TECHNOLOGIST PROVIDED HISTORY: Reason for exam:->sob FINDINGS: Sternotomy wires are present. Elevation of right hemidiaphragm is unchanged. The heart size and mediastinal contours are stable. The lungs are clear. Negative portable study. Vl Dup Lower Extremity Arteries Bilateral    Result Date: 6/26/2020  Vascular Lower Extremities Arterial Duplex Procedure -- PRELIMINARY SONOGRAPHER REPORT --   Demographics   Patient Name       ABY Leggett Colt   Date of Study      06/26/2020         Gender              Female   Patient Number     [id number]         Date of Birth       1965   Visit Number       [id number]          Age                 62 year(s)   Accession Number   [id number]          Room Number         OP   Corporate ID       [id number]           Sonographer         Yaniv Marley RVT   Ordering Physician Brien Booker MD        Physician           Readers  Procedure Type of Study:   Extremities Arteries:Lower Extremities Arterial Duplex, VL LOWER EXTREMITY  ARTERIES DUPLEX BILATERAL.   Tech Comments Right The right ankle/brachial index is 0.98 (the DP is 67 , the PT is 87 mmHg). The absence of normal triphasic flow at the common femoral artery may indicate aorto-iliac inflow disease. There is atherosclerotic plaque seen within the common femoral artery, popliteal artery consistent with < 50% stenosis. There is a patent femoral to popliteal bypass graft with biphasic flow throughout. The distal graft velocity is 39.3 cm/s which may indicate impending graft failure. A portion of the posterior tibial artery between the mid and distal segments demonstrated Doppler silence. Left The left ankle/brachial index is 0.82 (the DP is 73 , the PT is 73 mmHg). The absence of normal triphasic flow at the common femoral artery may indicate aorto-iliac inflow disease. There is atherosclerotic plaque seen within the common femoral artery, deep femoral artery, superficial femoral artery, popliteal artery consistent with <50% stenosis. Elevated velocities at the mid-distal superficial femoral artery indicate a >50% stenosis by velocity criteria (velocity went from 74.6 to 186 cm/s). The distal is peroneal demonstrated Doppler silence. ED COURSE   I have evaluated this patient. Attending physician available for consultation. Pain control was not required while here in the emergency department. Triage vitals suggesting hypotension at 86/55. Patient states that blood pressure tends to run this low and she is on Midodrine. U/A unremarkable. CBC w/o leukocytosis/anemia. CMP showing acute on chronic CKD w/ BUN of 136 and Cr of 4.1. Patient sodium was 186 with chloride of 87. Potassium six-point. She was given a gram of calcium gluconate, 30 of Kayexalate, and a amp of D50 with 10 units insulin IV. Troponin 0.06. suspecting from demand and SHAUNNA as EKG shows no ischemic changes. BNP 3200. Magnesium 3.5. Stable CXR. Discussed care with nephrologist who recommends NS at 100mL/hr and agrees with admission.   Case also discussed with hospitalist and orders have been placed. discussion was had with Ms. Bautista regarding leg swelling, SOB, ED findings, and recommendations for admission. All questions were answered. Patient in agreement. 45 minutes of critical care time spent not including any billable procedures.     MDM  Results for orders placed or performed during the hospital encounter of 06/29/20   CBC Auto Differential   Result Value Ref Range    WBC 8.0 4.0 - 11.0 K/uL    RBC 4.84 4.00 - 5.20 M/uL    Hemoglobin 11.7 (L) 12.0 - 16.0 g/dL    Hematocrit 37.0 36.0 - 48.0 %    MCV 76.5 (L) 80.0 - 100.0 fL    MCH 24.1 (L) 26.0 - 34.0 pg    MCHC 31.5 31.0 - 36.0 g/dL    RDW 19.7 (H) 12.4 - 15.4 %    Platelets 768 263 - 012 K/uL    MPV 8.8 5.0 - 10.5 fL    Neutrophils % 82.7 %    Lymphocytes % 9.0 %    Monocytes % 6.0 %    Eosinophils % 1.0 %    Basophils % 1.3 %    Neutrophils Absolute 6.6 1.7 - 7.7 K/uL    Lymphocytes Absolute 0.7 (L) 1.0 - 5.1 K/uL    Monocytes Absolute 0.5 0.0 - 1.3 K/uL    Eosinophils Absolute 0.1 0.0 - 0.6 K/uL    Basophils Absolute 0.1 0.0 - 0.2 K/uL   Comprehensive Metabolic Panel   Result Value Ref Range    Sodium 126 (L) 136 - 145 mmol/L    Potassium 6.5 (HH) 3.5 - 5.1 mmol/L    Chloride 87 (L) 99 - 110 mmol/L    CO2 25 21 - 32 mmol/L    Anion Gap 14 3 - 16    Glucose 84 70 - 99 mg/dL     (HH) 7 - 20 mg/dL    CREATININE 4.1 (H) 0.6 - 1.1 mg/dL    GFR Non- 11 (A) >60    GFR  14 (A) >60    Calcium 9.6 8.3 - 10.6 mg/dL    Total Protein 7.6 6.4 - 8.2 g/dL    Alb 4.0 3.4 - 5.0 g/dL    Albumin/Globulin Ratio 1.1 1.1 - 2.2    Total Bilirubin 0.7 0.0 - 1.0 mg/dL    Alkaline Phosphatase 207 (H) 40 - 129 U/L    ALT 15 10 - 40 U/L    AST 19 15 - 37 U/L    Globulin 3.6 g/dL   Urinalysis   Result Value Ref Range    Color, UA Yellow Straw/Yellow    Clarity, UA Clear Clear    Glucose, Ur Negative Negative mg/dL    Bilirubin Urine Negative Negative    Ketones, Urine Negative Negative mg/dL    Specific Gravity, UA 1.015 1.005 - 1.030    Blood, Urine Negative Negative    pH, UA 6.0 5.0 - 8.0    Protein, UA Negative Negative mg/dL    Urobilinogen, Urine 0.2 <2.0 E.U./dL    Nitrite, Urine Negative Negative    Leukocyte Esterase, Urine Negative Negative    Microscopic Examination Not Indicated     Urine Type NotGiven    Magnesium   Result Value Ref Range    Magnesium 3.50 (H) 1.80 - 2.40 mg/dL   Troponin   Result Value Ref Range    Troponin 0.06 (H) <0.01 ng/mL   Brain Natriuretic Peptide   Result Value Ref Range    Pro-BNP 3,163 (H) 0 - 124 pg/mL   Basic Metabolic Panel w/ Reflex to MG   Result Value Ref Range    Sodium 127 (L) 136 - 145 mmol/L    Potassium reflex Magnesium 5.2 (H) 3.5 - 5.1 mmol/L    Chloride 89 (L) 99 - 110 mmol/L    CO2 22 21 - 32 mmol/L    Anion Gap 16 3 - 16    Glucose 54 (L) 70 - 99 mg/dL     (HH) 7 - 20 mg/dL    CREATININE 3.3 (H) 0.6 - 1.1 mg/dL    GFR Non-African American 14 (A) >60    GFR  17 (A) >60    Calcium 9.5 8.3 - 10.6 mg/dL   Basic Metabolic Panel w/ Reflex to MG   Result Value Ref Range    Sodium 126 (L) 136 - 145 mmol/L    Potassium reflex Magnesium 5.3 (H) 3.5 - 5.1 mmol/L    Chloride 88 (L) 99 - 110 mmol/L    CO2 25 21 - 32 mmol/L    Anion Gap 13 3 - 16    Glucose 48 (LL) 70 - 99 mg/dL     (HH) 7 - 20 mg/dL    CREATININE 3.6 (H) 0.6 - 1.1 mg/dL    GFR Non-African American 13 (A) >60    GFR  16 (A) >60    Calcium 9.8 8.3 - 10.6 mg/dL   POCT Glucose   Result Value Ref Range    POC Glucose 46 (LL) 70 - 99 mg/dl    Performed on ACCU-CHEK    POCT Glucose   Result Value Ref Range    POC Glucose 76 70 - 99 mg/dl    Performed on ACCU-CHEK    POCT Glucose   Result Value Ref Range    POC Glucose 51 (L) 70 - 99 mg/dl    Performed on ACCU-CHEK    POCT Glucose   Result Value Ref Range    POC Glucose 45 (LL) 70 - 99 mg/dl    Performed on ACCU-CHEK    POCT Glucose   Result Value Ref Range    POC Glucose 60 (L) 70 - 99 mg/dl    Performed on ACCU-CHEK    POCT Glucose Result Value Ref Range    POC Glucose 156 (H) 70 - 99 mg/dl    Performed on ACCU-CHEK    POCT Glucose   Result Value Ref Range    POC Glucose 232 (H) 70 - 99 mg/dl    Performed on ACCU-CHEK    EKG 12 Lead   Result Value Ref Range    Ventricular Rate 53 BPM    Atrial Rate 53 BPM    P-R Interval 228 ms    QRS Duration 152 ms    Q-T Interval 486 ms    QTc Calculation (Bazett) 456 ms    P Axis 68 degrees    R Axis -71 degrees    T Axis 82 degrees    Diagnosis       Sinus bradycardia with 1st degree A-V blockLeft axis deviationRight bundle branch blockAnterolateral infarct (cited on or before 28-FEB-2020)Nonspecific ST abnormalityAbnormal ECGWhen compared with ECG of 03-JUN-2020 18:12,Vent. rate has decreased BY  33 BPMConfirmed by GUNJAN CHAUHAN MD ([id number]) on 6/30/2020 7:22:41 AM   EKG 12 Lead   Result Value Ref Range    Ventricular Rate 54 BPM    Atrial Rate 54 BPM    P-R Interval 240 ms    QRS Duration 162 ms    Q-T Interval 492 ms    QTc Calculation (Bazett) 466 ms    P Axis 70 degrees    R Axis -74 degrees    T Axis 73 degrees    Diagnosis       Sinus bradycardia with 1st degree A-V blockLeft axis deviationLeft anterior fascicular blockRight bundle branch blockPossible Lateral infarct (cited on or before 28-FEB-2020)Abnormal ECGWhen compared with ECG of 29-JUN-2020 17:10,No significant change was foundConfirmed by GUNJAN Gan MD ([id number]) on 6/30/2020 7:23:13 AM     I spoke with Drs. Lorella Alpers and Yvette Kennedy. We thoroughly discussed the history, physical exam, laboratory and imaging studies, as well as, emergency department course. Based upon that discussion, we've decided to admit [place] L[place]hal to the hospital.     Final Impression  1. Acute on chronic renal insufficiency    2. Hyperkalemia    3. Hypermagnesemia    4. Elevated troponin    5. HIGHTOWER (dyspnea on exertion)      Blood pressure 96/65, pulse 76, temperature 97.8 °F (36.6 °C), temperature source Oral, resp.  rate 18, height 5' 4\" (1.626 m), weight 247 lb 8 oz (112.3 kg), last menstrual period 10/24/2012, SpO2 98 %, not currently breastfeeding. DISPOSITION  Patient was discharged to home in good condition.          Edna Krueger  06/30/20 0132

## 2021-03-29 NOTE — TELEPHONE ENCOUNTER
Francia Arredondo the patients medical coordinator [phone] [facility] requesting a bianca back to discuss the patient

## 2021-03-30 NOTE — TELEPHONE ENCOUNTER
Goldie called stating the patient is eligable for the [employer] sensors and meter but will need a PA. They will no longer carry the [employer] light. Please advise for scripts and then a PA will need to be completed.

## 2021-04-02 NOTE — TELEPHONE ENCOUNTER
I tried calling the number listed and did receive the vm. I left a message for Jocelin Catalan her that we were returning her call and to give our office a callback.

## 2021-04-19 NOTE — PROGRESS NOTES
3.5 x 38 and 3.5 x 20 to Cx    CORONARY ANGIOPLASTY WITH STENT PLACEMENT  01/03/2018    MELODY- 3.0 x 38 and 2.75 x 26 and 2.75 x 8 and 2.75 x 22 to the LAD    CORONARY ANGIOPLASTY WITH STENT PLACEMENT  10/07/2019    MELODY- 2.5 x 8 to [address] GRAFT N/A 1/27/2020    CORONARY ARTERY BYPASS GRAFTING X 1, ON PUMP BEATING HEART, INTERNAL MAMMARY ARTERY TO LEFT ANTERIOR DESCENDING ARTERY, VAS CATH INSERTION, URI, PLATELET GEL APPLICATION, 5 LEVEL BILATERAL INTERCOSTAL NERVE BLOCK, STERNAL PLATING performed by Jodi Saenz MD at [address] Right 5/16/2019    fem-pop, performed by Olga Freitas MD at [address]  02/14/2019    CardioMEMs insertion for CHF    ROTATOR CUFF REPAIR Left 04/22/2016    TONSILLECTOMY      TRANSESOPHAGEAL ECHOCARDIOGRAM  01/26/2020    TRANSLUMINAL ANGIOPLASTY Left 10/08/2019    with stenting, at [facility]    TRANSLUMINAL ANGIOPLASTY Left 04/29/2020    of the [facility] re-occlusion    TUMOR EXCISION      benign tumors X 2 chest & axilla    UPPER GASTROINTESTINAL ENDOSCOPY  4/25/2013    BX barretts, HH, gastritis    UPPER GASTROINTESTINAL ENDOSCOPY  12/10/2015    UPPER GASTROINTESTINAL ENDOSCOPY  01/06/2017    Gastritis    VASCULAR SURGERY Left 12/08/2015    upper extremity and mesenteric angiogram (diagnostic only)    VASCULAR SURGERY Bilateral 07/07/2020    diagnostic lower extremity angiogram only    VASCULAR SURGERY Left 08/04/2020    angioplasty and stent of renal artery    VASCULAR SURGERY Left 08/05/2020    coil embolization of branch renal artery vessel for bleeding    VASCULAR SURGERY Left 09/01/2020    angioplasty and stenting of subclavian artery       Allergies:  is allergic to lisinopril. Social History:    TOBACCO:   reports that she has been smoking cigarettes. She has been smoking about 0.00 packs per day for the past 30.00 years.  She has never used smokeless tobacco.  ETOH:   reports no history of alcohol use.      Family History:       Problem Relation Age of Onset    Heart Disease Maternal Grandmother     Cancer Mother         lung and brain cancer    Cancer Maternal Aunt         lung and brain cancer       Current Medications:    Current Outpatient Medications:     potassium chloride (KLOR-CON M) 20 MEQ extended release tablet, Take 1 tablet by mouth daily, Disp: 30 tablet, Rfl: 0    torsemide (DEMADEX) 100 MG tablet, Take 1 tablet by mouth daily, Disp: 90 tablet, Rfl: 1    nitroGLYCERIN (NITROSTAT) 0.4 MG SL tablet, Place 1 tablet under the tongue every 5 minutes as needed for Chest pain up to max of 3 total doses.  If no relief after 1 dose, call 911., Disp: 25 tablet, Rfl: 0    Insulin Degludec (TRESIBA FLEXTOUCH) 100 UNIT/ML SOPN, Inject 30 Units into the skin nightly, Disp: 10 pen, Rfl: 5    Insulin Pen Needle (B-D ULTRAFINE III SHORT PEN) 31G X 8 MM MISC, Five shots a day, Disp: 200 each, Rfl: 5    insulin lispro (HUMALOG KWIKPEN U-200) 200 UNIT/ML SOPN pen, Inject 10 Units into the skin 3 times daily (before meals), Disp: 2 pen, Rfl: 5    metOLazone (ZAROXOLYN) 5 MG tablet, Take 1 tablet by mouth three times a week On Mon Weds and Friday, Disp: 1 tablet, Rfl: 0    carvedilol (COREG) 3.125 MG tablet, TAKE 1 TABLET BY MOUTH TWICE A DAY WITH MEALS, Disp: 180 tablet, Rfl: 3    atorvastatin (LIPITOR) 80 MG tablet, TAKE 1 TABLET BY MOUTH EVERY DAY AT NIGHT, Disp: 90 tablet, Rfl: 3    doxepin (SINEQUAN) 25 MG capsule, TAKE 1 CAPSULE BY MOUTH EVERY DAY AT NIGHT, Disp: , Rfl:     KLOR-CON M20 20 MEQ extended release tablet, TAKE 2 TABLETS BY MOUTH 4 TIMES DAILY, Disp: 720 tablet, Rfl: 1    methocarbamol (ROBAXIN) 500 MG tablet, Take 500 mg by mouth 2 times daily, Disp: , Rfl:     midodrine (PROAMATINE) 5 MG tablet, Take 5 mg by mouth 2 times daily, Disp: , Rfl:     spironolactone (ALDACTONE) 50 MG tablet, Take 50 mg by mouth daily, Disp: , Rfl:     Blood Glucose Monitoring Suppl (FREESTYLE FREEDOM LITE) w/Device KIT, Use to test glucose 4 times a day, Disp: 1 kit, Rfl: 0    blood glucose test strips (FREESTYLE LITE) strip, USE TO CHECK BLOOD GLUCOSE LEVELS FOUR TIMES DAILY, Disp: 200 strip, Rfl: 10    tiotropium (SPIRIVA RESPIMAT) 2.5 MCG/ACT AERS inhaler, INHALE 2 PUFFS INTO THE LUNGS DAILY, Disp: 1 Inhaler, Rfl: 6    traZODone (DESYREL) 100 MG tablet, Take 100 mg by mouth nightly as needed for Sleep Can take 1 or 2 tabs nightly as directed, Disp: , Rfl:     albuterol sulfate  (90 Base) MCG/ACT inhaler, Inhale 2 puffs into the lungs every 6 hours as needed for Wheezing or Shortness of Breath, Disp: 1 Inhaler, Rfl: 3    buprenorphine-naloxone (SUBOXONE) 8-2 MG FILM SL film, Place 1 Film under the tongue 2 times daily. , Disp: , Rfl:     benztropine (COGENTIN) 1 MG tablet, Take 1 mg by mouth nightly , Disp: , Rfl:     INSULIN SYRINGE .5CC/29G 29G X 1/2\" 0.5 ML MISC, 1 each by Does not apply route daily, Disp: 100 each, Rfl: 3    pantoprazole (PROTONIX) 40 MG tablet, Take 1 tablet by mouth 2 times daily, Disp: 60 tablet, Rfl: 0    clopidogrel (PLAVIX) 75 MG tablet, TAKE 1 TABLET BY MOUTH EVERY DAY, Disp: 30 tablet, Rfl: 5    magnesium oxide (MAG-OX) 400 (240 Mg) MG tablet, TAKE 1 TABLET ONCE DAILY, Disp: 30 tablet, Rfl: 11    busPIRone (BUSPAR) 30 MG tablet, Take 30 mg by mouth 2 times daily , Disp: , Rfl:     aspirin EC 81 MG EC tablet, Take 1 tablet by mouth daily, Disp: 30 tablet, Rfl: 3    ARIPiprazole (ABILIFY) 15 MG tablet, Take 15 mg by mouth daily , Disp: , Rfl:     lamoTRIgine (LAMICTAL) 200 MG tablet, Take 200 mg by mouth 2 times daily , Disp: , Rfl:         Objective:   PHYSICAL EXAM:    Telephone visit not able to obtain physical exam       DATA reviewed by me:   PFTs 12/11/18 FVC 1.63(47%) FEV1 1.18(43%) FEV1/FVC 72% TLC 4.23(79%) DLCO 14.23(59%) 6MW 440 F low O2 86%  PFTs 12/11/18 FVC 1.63(47%) FEV1 1.18(43%) FEV1/FVC 72% TLC 4.23(79%) DLCO 14.23(59%) 6MW 440 F low O2 86%    CT chest 2/24/2020 imaging reviewed by me and showed  New curvilinear opacities left upper lobe left lower lobe likely atelectasis/scarring  Multiple new scattered subcentimeter nodules bilaterally    CT chest 9/11/2020 imaging reviewed by me and showed   Unchanged 3 mm left lower lobe nodule     CT chest 11/30/2021  images reviewed by me and showed:   Improving with incompletely imaged small residual perinephric hematoma    PSG 12/7/18 AHI 23.7 and desaturation to 87%  CPAP titration 12/20/18 CPAP 8 cm H2O      CPAP data 02/04-03/05 2019 reviewed by me. Uses 3-4  hrs/night with 47% compliance and AHI of  0.9. CPAP 8    Assessment:       · Chronic cough   · Chronic bronchitis with probable emphysema  · Pulmonary nodules LL 3 mm - stable on repeat CT chest 11/30/20  · CAD, ischemic cardiomyopathy, mitral valve insufficiency, and chronic systolic heart failure (EF 30-35%). Post CABG 1/27/2020   · Moderate CLINT. CPAP 8 cm H2O. Declined retrying CPAP and alternative therapy. ONPO 11/6/2019 with no significant desaturation  · Mild restrictive defect with decreased diffusion capacity- likely due to obesity   · 45 pack year smoking - quit 8/2019       Plan:       CXR PA and lateral   Trial of Symbicort 160/4.5 2 puffs BID   Doxycycline 100 mg PO BID for 7 days. Continue Spiriva daily and Albuterol 2 puffs Q4-6 hrs PRN- refills today   PFTs and 6MW   Advised to continue with smoking cessation  Patient is up to date with Covid, Pneumococcal vaccine, influenza vaccine   CT chest, low dose protocol, screening for lung cancer November 2021. Risks, benefits and alternatives including doing nothing were discussed with patient. Follow up after CT chest         Renee Franco is a 60 y.o. female evaluated via telephone on 4/19/2021.       Consent:  She and/or health care decision maker is aware that that she may receive a bill for this telephone service, depending on her insurance coverage, and has provided verbal consent to proceed: Yes       Documentation:  I communicated with the patient and/or health care decision maker about: See above   Details of this discussion including any medical advice provided: See above       I Affirm this is a Patient Initiated Episode with an Established Patient who has not had a related appointment within my department in the past 7 days or scheduled within the next 24 hours.     Total Time: 21-30 minutes     Note: not billable if this call serves to triage the patient into an appointment for the relevant concern      Kristian Kirby

## 2021-04-21 NOTE — LETTER
[address] [facility] Telemetry  [address] [place] [id number]  Phone: [phone]             April 22, 2021    Patient: Tere Edwards   YOB: 1965   Date of Visit: 4/21/2021       To Whom It May Concern:    Dieudonne Beaulieu was seen and treated in our facility  beginning 4/21/2021 until current date. Pt's daughter Reena Lopez has accompanied Dieudonne Beaulieu for her stay thus far.       Sincerely,       Chandan Bradshaw RN         Signature:__________________________________

## 2021-04-22 PROBLEM — L03.90 CELLULITIS: Status: ACTIVE | Noted: 2021-01-01

## 2021-04-22 NOTE — CARE COORDINATION
Case Management Assessment  Initial Evaluation      Patient Name: Zac Whitmore  YOB: 1965  Diagnosis: Cellulitis [L03.90]  Date / Time: 4/21/2021  8:27 PM    Admission status/Date: 04/22/2021 Inpatient   Chart Reviewed: Yes      Patient Interviewed: Yes   Family Interviewed:  No      Hospitalization in the last 30 days:  No      Health Care Decision Maker :   Primary Decision Maker: nori madera \"Lanny\" - [facility] - [phone]    (CM - must 1st enter selection under Navigator - emergency contact- Health Care Decision Maker Relationship and pick relationship)   Who do you trust or have selected to make healthcare decisions for you      Met with: pt   Interview conducted  (bedside/phone): bedside    Current PCP: Tierra Mcpherson 12 required for SNF : Y          3 night stay required - [employer]  Support Systems/Care Needs: Family Members, Friends/Neighbors  Transportation: self    Meal Preparation: self    Housing  Living Arrangements: pt lives at home with her friend Kiara Sanchez.   Steps: 5  Intent for return to present living arrangements: Yes  Identified Issues: [address] [facility] with Home Health Care : No Agency:(Services)  Type of Home Care Services: None  Passport/Waiver : No  :                      Phone Number:    Passport/Waiver Services: n/a          Durable Medical Equiptment   DME Provider: n/a  Equipment:   Walker___Cane___RTS___ BSC___Shower Chair___Hospital Bed___W/C____Other________  02 at ____Liter(s)---wears(frequency)_______ HHN ___ CPAP___ BiPap___   N/A__x__      Home O2 Use :  No    If No for home O2---if presently on O2 during hospitalization:  Yes  if yes CM to follow for potential DC O2 need  Informed of need for care provider to bring portable home O2 tank on day of discharge for nursing to connect prior to leaving:   Not Indicated  Verbalized agreement/Understanding:   Not Indicated    Community Service Affiliation  Dialysis:  No    · Agency:  · Location:  · Dialysis Schedule:  · Phone:   · Fax: Other Community Services: n/a    DISCHARGE PLAN: Explained Case Management role/services. Chart review completed. Met with pt at bedside. She stated she is normally independent at home and plans on returning home when discharged. She reported having no DME, o2, HHC or community services. She is aware CM will follow for possible needs on discharge and she stated agreement. Pt denied needs or concerns for CM at this time. CM will follow. Please notify CM if needs or concerns arise.

## 2021-04-22 NOTE — ACP (ADVANCE CARE PLANNING)
Advance Care Planning   Healthcare Decision Maker:    Primary Decision Maker: cassy Ramos Song [facility] - [phone]    Click here to complete Healthcare Decision Makers including selection of the Healthcare Decision Maker Relationship (ie \"Primary\"). Pt wants her daughter Veronica Encinas" to be her primary decision maker if she is unable to make her medical decisions.

## 2021-04-22 NOTE — PROGRESS NOTES
Patient admitted to room 305 from [facility] . Patient oriented to room, call light, bed rails, phone, lights and bathroom. Patient instructed about the schedule of the day including: vital sign frequency, lab draws, possible tests, frequency of MD and staff rounds, daily weights, I &O's and prescribed diet. bed alarm in place, patient aware of placement and reason. Telemetry box in place, patient aware of placement and reason. Bed locked, in lowest position, side rails up 2/4, call light within reach. Recliner Assessment  Patient is able to demonstrate the ability to move from a reclining position to an upright position within the recliner. 4 Eyes Skin Assessment     The patient is being assess for   Admission    I agree that 2 RN's have performed a thorough Head to Toe Skin Assessment on the patient. ALL assessment sites listed below have been assessed. Areas assessed by both nurses:   [x]   Head, Face, and Ears   [x]   Shoulders, Back, and Chest, Abdomen  [x]   Arms, Elbows, and Hands   [x]   Coccyx, Sacrum, and Ischium  [x]   Legs, Feet, and Heels        Multiple abrasions to bilateral arms. Multiple wound to R and L leg and and L toes, see wound flow sheets. Some purple discoloration to buttocks.      **SHARE this note so that the co-signing nurse is able to place an eSignature**    Co-signer eSignature: Electronically signed by Kalia Perez RN on 4/22/21 at 6:19 AM EDT    Does the Patient have Skin Breakdown?  yes          Vivek Prevention initiated:  Yes   Wound Care Orders initiated:  Yes      [id number] [facility] nurse consulted for Pressure Injury (Stage 3,4, Unstageable, DTI, NWPT, Complex wounds)and New or Established Ostomies:  Yes      Primary Nurse eSignature: Electronically signed by Devon Gomez RN on 4/22/21 at 3:29 AM EDT

## 2021-04-22 NOTE — CONSULTS
failure), NYHA class I, acute on chronic, combined (HCC)    Cellulitis     Allergies:  Lisinopril     Temp max:     Recent Labs     04/21/21 2056   BUN 55*   CREATININE 1.7*   WBC 18.7*     No intake or output data in the 24 hours ending 04/22/21 0343  Culture Date      Source                       Results      Ht Readings from Last 1 Encounters:   04/21/21 5' 4\" (1.626 m)        Wt Readings from Last 1 Encounters:   04/22/21 207 lb 8 oz (94.1 kg)       Body mass index is 35.62 kg/m². Estimated Creatinine Clearance: 41 mL/min (A) (based on SCr of 1.7 mg/dL (H)). Goal Trough Level: 13-19 mcg/mL    Assessment/Plan:  Will initiate Vancomycin 1250 mg IV every 24 hours. Timing of trough level will be determined based on culture results, renal function, and clinical response. Vancomycin trough ordered for 0330 on 4/25/21. Thank you for the consult. Will continue to follow.    Ibeth Esteban [facility]

## 2021-04-22 NOTE — CONSULTS
Pharmacy Note  Vancomycin Consult    Alan Kirkpatrick is a 60 y.o. female started on Vancomycin for Pneumonia (CAP); consult received from Dr. Nasra Rousseau to manage therapy. Also receiving the following antibiotics: .     Patient Active Problem List   Diagnosis    Type 2 diabetes mellitus with diabetic polyneuropathy, with long-term current use of insulin ([address]    Essential hypertension    CLINT (obstructive sleep apnea)    Tobacco abuse disorder    GERD (gastroesophageal reflux disease)    Anxiety and depression    Hyponatremia    Iron deficiency anemia    CAD (coronary artery disease)    Mitral valve regurgitation    COPD (chronic obstructive pulmonary disease) ([address]    SHAUNNA (acute kidney injury) ([address]    Vitamin D deficiency    Dyslipidemia    Abel's esophagus    Acute on chronic congestive heart failure (HCC)    Viral hepatitis C    Pulmonary nodule    Class 2 severe obesity due to excess calories with serious comorbidity and body mass index (BMI) of 39.0 to 39.9 in adult ([address]    Bipolar disorder ([address]    Pulmonary hypertension (HCC)    Bilateral lower extremity edema    Habitual self-excoriation    Ulcer of left lower leg, limited to breakdown of skin (HCC)    Ulcer of right leg, limited to breakdown of skin ([address]    Arthritis    Cardiomyopathy ([address]    Chronic systolic heart failure ([address]    Peripheral venous insufficiency    Atherosclerosis of native artery of both lower extremities with bilateral ulceration of calves ([address]    Acute kidney injury superimposed on CKD ([address]    Morbid obesity with BMI of 40.0-44.9, adult ([address]    CKD (chronic kidney disease) stage 4, GFR 15-29 ml/min (HCC)    Hypokalemia    Dyspnea on exertion    Ischemic heart disease    Moderate protein-calorie malnutrition (HCC)    DKA, type 2, not at goal [facility])    Acute on chronic systolic CHF (congestive heart failure) (HCC)    Acute systolic CHF (congestive heart failure) (HCC)    CHF (congestive heart failure), NYHA class I, acute on chronic, combined (HCC)     Allergies:  Lisinopril     Temp max:     Recent Labs     04/21/21 2056   BUN 55*   CREATININE 1.7*   WBC 18.7*     No intake or output data in the 24 hours ending 04/21/21 2226  Culture Date      Source                       Results      Ht Readings from Last 1 Encounters:   04/21/21 5' 4\" (1.626 m)        Wt Readings from Last 1 Encounters:   04/21/21 230 lb (104.3 kg)       Body mass index is 39.48 kg/m². Estimated Creatinine Clearance: 43 mL/min (A) (based on SCr of 1.7 mg/dL (H)). Goal Trough Level: 13-19 mcg/mL    Assessment/Plan:  Will initiate Vancomycin with a one time loading dose of 2000 mg x 1 dose in ER. Thank you for the consult.    Alfred Franco [facility]

## 2021-04-22 NOTE — PROGRESS NOTES
Consult for ortho done thru [facility] to [facility]. 4-22-21 @145.  Joann Richardson

## 2021-04-22 NOTE — H&P
Hospital Medicine History & Physical      PCP: Ashia Anglin PA-C    Date of Admission: 4/21/2021    Date of Service: Pt seen/examined on 4/22/2021    Chief Complaint:    Chief Complaint   Patient presents with    Shoulder Pain     pt states right shoulder pain for months already seeing ortho, has MRI ordered.  Shortness of Breath     all the time, sees pulmonary at [facility] Cough     X3 months already on antibiotics and inhaler    Foot Swelling     for weeks    Spasms     pt states \"I am always here for this\"       History Of Present Illness: The patient is a 60 y.o. female with DM, COPD, CAD, Chronic combined CHF, CKD who presented to [facility] ED with complaint of shaking. She states she was shaking. Patient is a poor historian. She c/o shortness of breath. She f/w Dr. Jenny Oliver. She states her feet are swollen and her lower extremities are red. She used to go to the [facility] and stopped going. She's had a cough for 3 months. She was given an inhaler and an antibiotic. Denies fevers, chills, chest pain, abdominal pain, nausea, vomiting, or diarrhea. She does not wear oxygen at home. She is on 2 L O2. Labs with hyponatremia, hypokalemia, lactic acidosis, leukocytosis. Troponin 0.07, BNP 2,961. Creatinine at baseline. CXR with silhouetting of the left heart border. X-ray left foot/ankle with subacute/chronic fracture of navicular bone, associated mild talonavicular subluxation. Admitted to PCU on telemetry. Cardiology and nephrology consulted.       Past Medical History:        Diagnosis Date    Acute blood loss anemia 8/5/2020    Acute kidney injury ([address] 12/25/2019    Acute kidney injury superimposed on CKD ([address] 8/5/2020    Acute on chronic combined systolic and diastolic congestive heart failure ([address] 1/10/2020    Acute on chronic right-sided heart failure ([address] 08/2020    Alcohol dependence ([address] 3/10/2010    Arthritis     Asthma     CAD (coronary artery disease)     Cellulitis 6/3/2020    COPD (chronic obstructive pulmonary disease) (HCC)     Diabetic ulcer of left foot associated with type 2 diabetes mellitus, limited to breakdown of skin ([facility] 1/18/2020    Diabetic ulcer of toe of right foot associated with type 2 diabetes mellitus ([facility] 1/18/2020    Left renal artery stenosis ([facility] 08/2020    MI (myocardial infarction) ([facility] 10/07/2019    NSTEMI    Positive FIT (fecal immunochemical test) 2/28/2020    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 steal syndrome 09/01/2020    Traumatic perinephric hematoma, left, initial encounter 8/4/2020       Past Surgical History:        Procedure Laterality Date    ARTERIAL BYPASS SURGRY Left 01/06/2016    Axillary/Subclavian to Brachial Bypass w/reversed SVG    CARDIAC CATHETERIZATION  03/27/2018    Dr. Shaina Caba - at [address] [place]  01/20/2020    Dr. Nate Cee      pancreatitis post surgery    CORONARY ANGIOPLASTY WITH STENT PLACEMENT  03/16/2018    MELODY- 3.5 x 38 and 3.5 x 20 to Cx    CORONARY ANGIOPLASTY WITH STENT PLACEMENT  01/03/2018    MELODY- 3.0 x 38 and 2.75 x 26 and 2.75 x 8 and 2.75 x 22 to the LAD    CORONARY ANGIOPLASTY WITH STENT PLACEMENT  10/07/2019    MELODY- 2.5 x 8 to [address] GRAFT N/A 1/27/2020    CORONARY ARTERY BYPASS GRAFTING X 1, ON PUMP BEATING HEART, INTERNAL MAMMARY ARTERY TO LEFT ANTERIOR DESCENDING ARTERY, VAS CATH INSERTION, URI, PLATELET GEL APPLICATION, 5 LEVEL BILATERAL INTERCOSTAL NERVE BLOCK, STERNAL PLATING performed by Ray Figueroa MD at [address] Right 5/16/2019    fem-pop, performed by Jessica Avila MD at [address]  02/14/2019    CardioMEMs insertion for CHF    ROTATOR CUFF REPAIR Left 04/22/2016    TONSILLECTOMY      TRANSESOPHAGEAL ECHOCARDIOGRAM  01/26/2020    TRANSLUMINAL ANGIOPLASTY Left 10/08/2019    with stenting, at [facility]   [id number] [facility] ANGIOPLASTY Left 04/29/2020    of the [facility] re-occlusion    TUMOR EXCISION      benign tumors X 2 chest & axilla    UPPER GASTROINTESTINAL ENDOSCOPY  4/25/2013    BX barretts, HH, gastritis    UPPER GASTROINTESTINAL ENDOSCOPY  12/10/2015    UPPER GASTROINTESTINAL ENDOSCOPY  01/06/2017    Gastritis    VASCULAR SURGERY Left 12/08/2015    upper extremity and mesenteric angiogram (diagnostic only)    VASCULAR SURGERY Bilateral 07/07/2020    diagnostic lower extremity angiogram only    VASCULAR SURGERY Left 08/04/2020    angioplasty and stent of renal artery    VASCULAR SURGERY Left 08/05/2020    coil embolization of branch renal artery vessel for bleeding    VASCULAR SURGERY Left 09/01/2020    angioplasty and stenting of subclavian artery       Medications Prior to Admission:    Prior to Admission medications    Medication Sig Start Date End Date Taking? Authorizing Provider   QUEtiapine (SEROQUEL XR) 50 MG extended release tablet Take 1 mg by mouth nightly Not sure of dose   Yes Historical Provider, MD   budesonide-formoterol (SYMBICORT) 160-4.5 MCG/ACT AERO Inhale 2 puffs into the lungs 2 times daily 4/19/21  Yes Wilbert Blake MD   torsemide (DEMADEX) 100 MG tablet Take 1 tablet by mouth daily 2/23/21  Yes Claire Jurado APRN - CNP   nitroGLYCERIN (NITROSTAT) 0.4 MG SL tablet Place 1 tablet under the tongue every 5 minutes as needed for Chest pain up to max of 3 total doses.  If no relief after 1 dose, call 911. 2/23/21  Yes Devan Antonio APRN - CNP   Insulin Degludec (TRESIBA FLEXTOUCH) 100 UNIT/ML SOPN Inject 30 Units into the skin nightly 2/1/21  Yes Canary Manner, APRN - CNP   insulin lispro (HUMALOG KWIKPEN U-200) 200 UNIT/ML SOPN pen Inject 10 Units into the skin 3 times daily (before meals) 2/1/21  Yes Canary Manner, APRN - CNP   metOLazone (ZAROXOLYN) 5 MG tablet Take 1 tablet by mouth three times a week On Mon Weds and Friday 1/15/21  Yes Kelsey Saenz MD   carvedilol (COREG) 3.125 MG Non-tender. Non-distended. Normal bowel sounds. No hernia. Skin: Warm and dry. Chronic venous changes BLE's. Open wounds  M/S: No cyanosis. No joint deformity. No clubbing. Neuro: Awake. Nods off easily. Grossly nonfocal    Psych: Oriented x 3. No anxiety or agitation. CBC:   Recent Labs     04/21/21 2056 04/22/21  0811   WBC 18.7* 15.3*   HGB 17.1* 15.0   HCT 49.3* 44.0   MCV 85.9 87.9    342     BMP:   Recent Labs     04/21/21 2056 04/22/21  0811   * 120*   K 2.5* 3.4*   CL 68* 73*   CO2 42* 36*   BUN 55* 58*   CREATININE 1.7* 1.7*     LIVER PROFILE:   Recent Labs     04/21/21 2056   AST 17   ALT 13   BILITOT 0.6   ALKPHOS 224*       CARDIAC ENZYMES  Recent Labs     04/21/21 2056 04/21/21  2304   TROPONINI 0.07* 0.07*       U/A:    Lab Results   Component Value Date    COLORU Yellow 03/16/2021    WBCUA 0-2 11/30/2020    RBCUA None seen 11/30/2020    BACTERIA 2+ 11/30/2020    CLARITYU Clear 03/16/2021    SPECGRAV 1.010 03/16/2021    LEUKOCYTESUR Negative 03/16/2021    BLOODU Negative 03/16/2021    GLUCOSEU >=1000 03/16/2021    AMORPHOUS Rare 08/04/2020       CULTURES  COVID: not detected    EKG:  I have reviewed the EKG with the following interpretation:   Sinus rhythm, Premature atrial complexes, Right bundle branch block, Left anterior fascicular block, left axis Septal infarct, Nonspecific ST abnormality, When compared with ECG of 16-MAR-2021 11:35, Premature atrial complexes are present    RADIOLOGY  XR FOOT LEFT (MIN 3 VIEWS)   Final Result   Probable subacute or chronic fracture of the navicular bone with associated   mild talonavicular subluxation. Recommend orthopedic consultation and   suggest a CT scan of the foot to further characterize the area. Bony spurring along the calcaneus. XR ANKLE LEFT (MIN 3 VIEWS)   Final Result   Questionable subacute or chronic fracture of the navicular bone. Recommend a   follow-up left foot series.       Mild soft tissue swelling around the ankle and diffuse osteopenia with no   obvious ankle fracture seen. XR CHEST PORTABLE   Preliminary Result   Silhouetting of the left heart border suggestive of a lingular infiltrate. Followup to resolution is suggested. Pertinent previous results reviewed   Stress 8/22/2020   Summary    Calculated LVEF is not reliable on study which is technically difficult,    estimate of 51% is likely an overestimate    Mid-distal anteroseptal/apical akinesis    Large fixed defects in anteroseptal/apical walls    Mild to moderate reversibility in lateral walls consistent with ischemia        Overall, this would be considered an abnormal high risk study       Limited Echo 8/19/2020   Summary   Limited only f/u for LVEF and mitral regurgitation. Technically difficult examination. The left ventricular systolic function is severely reduced with an ejection   fraction of 25%. There is hypokinesis of the apex, apical lateral, apical septum,   anterioseptum and anterior walls. Compared to previous study from 7-1-2020 no changes noted in left   ventricular function. Moderate mitral regurgitation. Jo Ann Oreilly have reviewed the chart on Deric Burgosman and personally interviewed and examined patient, reviewed the data (labs and imaging) and after discussion with my PA formulated the plan. Agree with note with the following edits. HPI:     60year old white female who is a poor historian. She has chronic systolic CHF/COPD/PAD/smoker/COPD not home home oxygen/DM type 2/MI/CAD/s/p CABG. She is a poor historian. She has noticed some redness in her legs but denies any fever or chills. She has had some dyspnea. She does have systolic heart failure. Denies any abdominal pain nausea vomiting. She has been vaccinated for Covid. I reviewed the patient's Past Medical History, Past Surgical History, Medications, and Allergies.      Physical exam:    /72   Pulse 88 Temp 97.2 °F (36.2 °C) (Oral)   Resp 16   Ht 5' 4\" (1.626 m)   Wt 207 lb 8 oz (94.1 kg)   LMP 10/24/2012   SpO2 95%   BMI 35.62 kg/m²     Gen: No distress. Alert. Chronically sick appearing. Looks much older than stated age  Eyes: PERRL. No sclera icterus. No conjunctival injection. ENT: No discharge. Pharynx clear. Neck: Trachea midline. Normal thyroid. Resp: No accessory muscle use. No crackles. + wheezes. No rhonchi. No dullness on percussion. CV: Regular rate. Regular rhythm. No murmur or rub. ++ edema. GI: Non-tender. Non-distended. No masses. No organomegaly. Normal bowel sounds. No hernia. Skin: Mild redness of both legs but no increase in warmth. No tenderness. Several open areas are present in both lower extremity. Pedal pulses are diminished. Lymph: No cervical LAD. No supraclavicular LAD. M/S: No cyanosis. No joint deformity. No clubbing. Neuro: Awake. Reflexes 2+ symmetric bilaterally. Moves all 4 extremities, non focal  Psych: Oriented x 3. No anxiety or agitation. ASSESSMENT/PLAN:  Hyponatremia  - admitted to PCU on tele  - we consulted cardiology and nephrology   -[facility] labs. Acute hypoxic respiratory failure  - she is requiring 2 L O2. Attempted to wean off and SpO2 dropped to 78% on RA. Acute on chronic combined CHF  CAD s/p CABG   Ischemic cardiomyopathy  Severe pulmonary hypertension  Elevated troponin 0.07  - Cardiology consult. - daily weight's; I&O's. Low sodium diet  - Zaroxolyn Mon, Wed, Fri  - continue aspirin, Plavix, Atorvastatin, Coreg.  -Her weight is down. Nephrology wants to hold off Lasix. She got Demadex this morning. COPD  - continue Spiriva, Symbicort. CKD stage 3  - Baseline 1.3-1.6  - 1.7 on admission  Nephrology consult    Mild Celllulitis BLE  - most likely chronic changes of BLE's. - Doxy D#1. Stopped Vanc, Cefepime. Hypokalemia  - replaced.     Lactic acidosis  Leukocytosis  - monitor lactic, CBC  - improving    PAD/PVD  - F/w Dr. Stanley Ramon    Chronic BLE wounds  - F/w [address] [facility]    DM type 2  - monitor glucose  - continue Lantus 30 units nightly. SSI Coverage. Subacute/chronic fracture of navicular bone  - X-ray recommends ortho consult and CT of foot. Obesity  - Body mass index is 35.62 kg/m².   - Recommended weight loss    DVT Prophylaxis: Lovenox 30 mg daily  Diet: DIET LOW SODIUM 2 GM;  Code Status: Full Code    Sruthi ROSENBERGP-C  4/22/2021      ROSARIO PARK 4/22/2021 11:30 AM

## 2021-04-22 NOTE — PROGRESS NOTES
Bedside report and Pt care transferred to [facility]. Pt denies any assistance at this time.

## 2021-04-22 NOTE — CONSULTS
ARTERIAL BYPASS SURGRY Left 01/06/2016    Axillary/Subclavian to Brachial Bypass w/reversed SVG    CARDIAC CATHETERIZATION  03/27/2018    Dr. Jade Flies - at [address] [place]  01/20/2020    Dr. Dela Cruz Leader      pancreatitis post surgery    CORONARY ANGIOPLASTY WITH STENT PLACEMENT  03/16/2018    MELODY- 3.5 x 38 and 3.5 x 20 to Cx    CORONARY ANGIOPLASTY WITH STENT PLACEMENT  01/03/2018    MELODY- 3.0 x 38 and 2.75 x 26 and 2.75 x 8 and 2.75 x 22 to the LAD    CORONARY ANGIOPLASTY WITH STENT PLACEMENT  10/07/2019    MELODY- 2.5 x 8 to [address] GRAFT N/A 1/27/2020    CORONARY ARTERY BYPASS GRAFTING X 1, ON PUMP BEATING HEART, INTERNAL MAMMARY ARTERY TO LEFT ANTERIOR DESCENDING ARTERY, VAS CATH INSERTION, URI, PLATELET GEL APPLICATION, 5 LEVEL BILATERAL INTERCOSTAL NERVE BLOCK, STERNAL PLATING performed by Jericho Infante MD at [address] Right 5/16/2019    fem-pop, performed by Michela Mcdonald MD at [address]  02/14/2019    CardioMEMs insertion for CHF    ROTATOR CUFF REPAIR Left 04/22/2016    TONSILLECTOMY      TRANSESOPHAGEAL ECHOCARDIOGRAM  01/26/2020    TRANSLUMINAL ANGIOPLASTY Left 10/08/2019    with stenting, at [facility]    [id number] Left 04/29/2020    of the [facility] re-occlusion    TUMOR EXCISION      benign tumors X 2 chest & axilla    UPPER GASTROINTESTINAL ENDOSCOPY  4/25/2013    BX barretts, HH, gastritis    UPPER GASTROINTESTINAL ENDOSCOPY  12/10/2015    UPPER GASTROINTESTINAL ENDOSCOPY  01/06/2017    Gastritis    VASCULAR SURGERY Left 12/08/2015    upper extremity and mesenteric angiogram (diagnostic only)    VASCULAR SURGERY Bilateral 07/07/2020    diagnostic lower extremity angiogram only    VASCULAR SURGERY Left 08/04/2020    angioplasty and stent of renal artery    VASCULAR SURGERY Left 08/05/2020    coil embolization of branch renal artery vessel for bleeding  VASCULAR SURGERY Left 09/01/2020    angioplasty and stenting of subclavian artery         Medications Prior to Admission:   Prior to Admission medications    Medication Sig Start Date End Date Taking? Authorizing Provider   QUEtiapine (SEROQUEL XR) 50 MG extended release tablet Take 1 mg by mouth nightly Not sure of dose   Yes Historical Provider, MD   budesonide-formoterol (SYMBICORT) 160-4.5 MCG/ACT AERO Inhale 2 puffs into the lungs 2 times daily 4/19/21  Yes Tierney Randall MD   torsemide (DEMADEX) 100 MG tablet Take 1 tablet by mouth daily 2/23/21  Yes Denna Milderd Burkitt, APRN - CNP   nitroGLYCERIN (NITROSTAT) 0.4 MG SL tablet Place 1 tablet under the tongue every 5 minutes as needed for Chest pain up to max of 3 total doses.  If no relief after 1 dose, call 911. 2/23/21  Yes TRAY Cleary CNP   Insulin Degludec (TRESIBA FLEXTOUCH) 100 UNIT/ML SOPN Inject 30 Units into the skin nightly 2/1/21  Yes TRAY Rodriguez CNP   insulin lispro (HUMALOG KWIKPEN U-200) 200 UNIT/ML SOPN pen Inject 10 Units into the skin 3 times daily (before meals) 2/1/21  Yes TRAY Rodriguez CNP   metOLazone (ZAROXOLYN) 5 MG tablet Take 1 tablet by mouth three times a week On Mon Weds and Friday 1/15/21  Yes Jemma Barreto MD   carvedilol (COREG) 3.125 MG tablet TAKE 1 TABLET BY MOUTH TWICE A DAY WITH MEALS 1/11/21  Yes TRAY Cleary CNP   atorvastatin (LIPITOR) 80 MG tablet TAKE 1 TABLET BY MOUTH EVERY DAY AT NIGHT 1/11/21  Yes TRAY Salgado CNP   doxepin (SINEQUAN) 25 MG capsule TAKE 1 CAPSULE BY MOUTH EVERY DAY AT NIGHT 12/20/20  Yes Historical Provider, MD   KLOR-CON M20 20 MEQ extended release tablet TAKE 2 TABLETS BY MOUTH 4 TIMES DAILY 1/4/21  Yes TRAY Cleary CNP   methocarbamol (ROBAXIN) 500 MG tablet Take 500 mg by mouth 2 times daily   Yes Historical Provider, MD   midodrine (PROAMATINE) 5 MG tablet Take 5 mg by mouth 2 times daily   Yes Historical Provider, MD spironolactone (ALDACTONE) 50 MG tablet Take 50 mg by mouth daily   Yes Historical Provider, MD   tiotropium (SPIRIVA RESPIMAT) 2.5 MCG/ACT AERS inhaler INHALE 2 PUFFS INTO THE LUNGS DAILY 10/13/20  Yes Nicolas Frazier MD   buprenorphine-naloxone (SUBOXONE) 8-2 MG FILM SL film Place 1 Film under the tongue 2 times daily. Yes Historical Provider, MD   benztropine (COGENTIN) 1 MG tablet Take 1 mg by mouth nightly  12/13/19  Yes Historical Provider, MD   pantoprazole (PROTONIX) 40 MG tablet Take 1 tablet by mouth 2 times daily 10/1/19  Yes Jaclyn Craft MD   clopidogrel (PLAVIX) 75 MG tablet TAKE 1 TABLET BY MOUTH EVERY DAY 9/3/19  Yes TRAY Justin CNP   magnesium oxide (MAG-OX) 400 (240 Mg) MG tablet TAKE 1 TABLET ONCE DAILY 5/1/19  Yes Margo Suarez MD   busPIRone (BUSPAR) 30 MG tablet Take 30 mg by mouth 2 times daily  4/2/18  Yes Historical Provider, MD   aspirin EC 81 MG EC tablet Take 1 tablet by mouth daily 1/8/16  Yes Marnie Phalen, MD   ARIPiprazole (ABILIFY) 15 MG tablet Take 15 mg by mouth daily    Yes Historical Provider, MD   lamoTRIgine (LAMICTAL) 200 MG tablet Take 200 mg by mouth 2 times daily    Yes Historical Provider, MD   Insulin Pen Needle (B-D ULTRAFINE III SHORT PEN) 31G X 8 MM MISC Five shots a day 2/1/21   TRAY Moreno CNP   Blood Glucose Monitoring Suppl (FREESTYLE FREEDOM LITE) w/Device KIT Use to test glucose 4 times a day 11/17/20   Pia Gibbs MD   blood glucose test strips (FREESTYLE LITE) strip USE TO CHECK BLOOD GLUCOSE LEVELS FOUR TIMES DAILY 11/17/20   Pia Gibbs MD   INSULIN SYRINGE .5CC/29G 29G X 1/2\" 0.5 ML MISC 1 each by Does not apply route daily 12/3/19   TRAY Moreno CNP       Allergies:  Lisinopril    Social History:    Tobacco:  reports that she has been smoking cigarettes. She has a 30.00 pack-year smoking history. She has never used smokeless tobacco.   Alcohol:  reports no history of alcohol use.    Illicit Drug: No  Family History:       Problem Relation Age of Onset    Heart Disease Maternal Grandmother     Cancer Mother         lung and brain cancer    Cancer Maternal Aunt         lung and brain cancer       REVIEW OF SYSTEMS:    CONSTITUTIONAL:  negative  MUSCULOSKELETAL:  positive for  pain  All other systems reviewed and negative    PHYSICAL EXAM:    awake, alert, cooperative, no apparent distress, and appears stated age  MUSCULOSKELETAL:  there is no redness, warmth, or swelling of the joints  full range of motion noted  motor strength is 5 out of 5 all extremities bilaterally  tone is normal  with exception of  LEFT ANKLE:  Multiple ulcerations on the lower leg with erythema present but no draining wounds. Mildly swollen ankle and foot with vague complaints of pain globally with palpation. She is able to move her foot and ankle with minimal pain. She was standing on the foot when I entered the room today. No deformity in the foot or ankle. Sensation intact to touch and pulses intact. DATA:    CBC:   Recent Labs     04/21/21 2056 04/22/21  0811   WBC 18.7* 15.3*   HGB 17.1* 15.0    342     BMP:    Recent Labs     04/21/21 2056 04/22/21  0811 04/22/21  1237   * 120* 121*   K 2.5* 3.4* 3.2*   CL 68* 73* 72*   CO2 42* 36* 39*   BUN 55* 58* 59*   CREATININE 1.7* 1.7* 1.6*   GLUCOSE 139* 272* 370*     INR: No results for input(s): INR in the last 72 hours. Radiology:   XR FOOT LEFT (MIN 3 VIEWS)   Final Result   Probable subacute or chronic fracture of the navicular bone with associated   mild talonavicular subluxation. Recommend orthopedic consultation and   suggest a CT scan of the foot to further characterize the area. Bony spurring along the calcaneus. XR ANKLE LEFT (MIN 3 VIEWS)   Final Result   Questionable subacute or chronic fracture of the navicular bone. Recommend a   follow-up left foot series.       Mild soft tissue swelling around the ankle and diffuse osteopenia with no obvious ankle fracture seen. XR CHEST PORTABLE   Preliminary Result   Silhouetting of the left heart border suggestive of a lingular infiltrate. Followup to resolution is suggested. IMPRESSION/RECOMMENDATIONS:    Assessment: Subacute left navicular fracture    Plan:  1) Have recommended she wear her boot for comfort and support. Would recommend wound care follow for the ulcerations in the lower leg. She wishes to continue her care under her current podiatrist and I have provided our information to her should she wish to f/u with us at some point in the future. Continue with her podiatry recs otherwise. We will sign off at this time and defer to her podiatrist for further care. Thank you for the opportunity to consult on this patient.     Mary Clements

## 2021-04-22 NOTE — FLOWSHEET NOTE
04/22/21 0841   Vitals   Temp 97.2 °F (36.2 °C)   Temp Source Oral   Pulse 88   Heart Rate Source Monitor   Resp 16   /72   Level of Consciousness Alert (0)   MEWS Score 1   Oxygen Therapy   SpO2 95 %   O2 Device Nasal cannula   O2 Flow Rate (L/min) 2 L/min   Vital signs stable. Pt is lethargic but arouses easily to voice. Pt is alert and oriented and denies any worsening pain or SOB at the moment. Nothing new noted on head to toe assessment. Pt is NSR on the monitor. Morning medication administration completed. Pt denies any further assistance at the moment. Will continue to monitor.

## 2021-04-22 NOTE — CONSULTS
[address] [place] [address]  [phone]        Reason for Consultation/Chief Complaint: \"I have been having shaking\". Consulted for:  Acute on chronic combined CHF    History of Present Illness:  Celio Sanders is a 60 y.o. patient who presented to [facility] 4/21/21 with c/o shaking and multiple other complaints. She has PMH: CAD s/p PCI to LAD and LCx 2018, s/p 1V CABG (LIMA to LAD,Jan 2020 for restenosis of LAD and LCCx and significant dz in RCA), ischemic CM OQ=82%; chronic systolic CHF s/p CardioMEMs implant (2/2019), MR, PAD, CKD, DM, COPD, andleg wounds. Patient follows with SARAH Mathew at our [facility] office. Last OV 2/2021. Most recent Echo 8/18/20 EF 25%, moderate MR. Most recent lexiscan myoview stress test 8/22/20 showed large fixed defects in anteroseptal/apical walls, mild to mod reversibility in lateral walls consistent with ischemia. Note admit January 2021 with edema, increased weight and SOB. She was diuresed with IV Lasix and PO metolazone. Diuretic regimen on d/c included torsemide 100 mg daily, spironolactone 50 mg daily, and metolazone 5 mg Monday Wednesday and Fridays. She is on low-dose beta-blocker though no ACE or ARB due to renal insufficiency and hypotension requiring midodrine. Now presents with multiple complaints including shaking, mid-back pain, weakness, fatigue, and leg cramps. She has not missed diuretics. Admit EKG 4/21/21 showed SR, PAC's, RBBB, LAFB, left axis septal infarct, nonspecific ST abnormality (PAC's new compared to 3/21 EKG). CXR with ? Lingular infiltrate. K+ 2.5, TMC=1549; Shady 0.07, 0.07. Patient with no complaints of chest pain, SOB, palpitations, dizziness, edema, or orthopnea/PND. I have been asked to provide consultation regarding further management and testing.       Past Medical History:   has a past medical history of Acute blood loss anemia, Acute kidney injury ([address] Acute kidney injury superimposed on CKD ([facility] Acute on chronic combined systolic and diastolic congestive heart failure (HCC), Acute on chronic right-sided heart failure ([address] Alcohol dependence ([address] Arthritis, Asthma, CAD (coronary artery disease), Cellulitis, COPD (chronic obstructive pulmonary disease) ([address] Diabetic ulcer of left foot associated with type 2 diabetes mellitus, limited to breakdown of skin ([address] Diabetic ulcer of toe of right foot associated with type 2 diabetes mellitus ([address] Left renal artery stenosis ([address] MI (myocardial infarction) ([address] Positive FIT (fecal immunochemical test), 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 Surgical History:   has a past surgical history that includes Tonsillectomy; Cholecystectomy; tumor excision; Upper gastrointestinal endoscopy (4/25/2013); Upper gastrointestinal endoscopy (12/10/2015); Upper gastrointestinal endoscopy (01/06/2017); Arterial bypass surgry (Left, 01/06/2016); Cardiac catheterization (03/27/2018); Coronary angioplasty with stent (03/16/2018); Rotator cuff repair (Left, 04/22/2016); Coronary angioplasty with stent (01/03/2018); Insertable Cardiac Monitor (02/14/2019); femoral bypass (Right, 5/16/2019); transluminal angioplasty (Left, 10/08/2019); Cardiac catheterization (01/20/2020); Coronary artery bypass graft (N/A, 1/27/2020); vascular surgery (Left, 12/08/2015); transluminal angioplasty (Left, 04/29/2020); vascular surgery (Bilateral, 07/07/2020); Coronary angioplasty with stent (10/07/2019); transesophageal echocardiogram (01/26/2020); vascular surgery (Left, 08/04/2020); vascular surgery (Left, 08/05/2020); and vascular surgery (Left, 09/01/2020). Social History:   reports that she has been smoking cigarettes. She has a 30.00 pack-year smoking history. She has never used smokeless tobacco. She reports that she does not drink alcohol or use drugs.      Family History:  family history includes Cancer in her Yes Historical Provider, MD   spironolactone (ALDACTONE) 50 MG tablet Take 50 mg by mouth daily   Yes Historical Provider, MD   tiotropium (SPIRIVA RESPIMAT) 2.5 MCG/ACT AERS inhaler INHALE 2 PUFFS INTO THE LUNGS DAILY 10/13/20  Yes Whit Merritt MD   buprenorphine-naloxone (SUBOXONE) 8-2 MG FILM SL film Place 1 Film under the tongue 2 times daily.    Yes Historical Provider, MD   benztropine (COGENTIN) 1 MG tablet Take 1 mg by mouth nightly  12/13/19  Yes Historical Provider, MD   pantoprazole (PROTONIX) 40 MG tablet Take 1 tablet by mouth 2 times daily 10/1/19  Yes Danny Swanson MD   clopidogrel (PLAVIX) 75 MG tablet TAKE 1 TABLET BY MOUTH EVERY DAY 9/3/19  Yes TRAY Rodriguez CNP   magnesium oxide (MAG-OX) 400 (240 Mg) MG tablet TAKE 1 TABLET ONCE DAILY 5/1/19  Yes Antonieta Chun MD   busPIRone (BUSPAR) 30 MG tablet Take 30 mg by mouth 2 times daily  4/2/18  Yes Historical Provider, MD   aspirin EC 81 MG EC tablet Take 1 tablet by mouth daily 1/8/16  Yes Kingsley Castaneda MD   ARIPiprazole (ABILIFY) 15 MG tablet Take 15 mg by mouth daily    Yes Historical Provider, MD   lamoTRIgine (LAMICTAL) 200 MG tablet Take 200 mg by mouth 2 times daily    Yes Historical Provider, MD   Insulin Pen Needle (B-D ULTRAFINE III SHORT PEN) 31G X 8 MM MISC Five shots a day 2/1/21   TRAY Pete CNP   Blood Glucose Monitoring Suppl (FREESTYLE FREEDOM LITE) w/Device KIT Use to test glucose 4 times a day 11/17/20   Ashia Ritter MD   blood glucose test strips (FREESTYLE LITE) strip USE TO CHECK BLOOD GLUCOSE LEVELS FOUR TIMES DAILY 11/17/20   Ashia Ritter MD   INSULIN SYRINGE .5CC/29G 29G X 1/2\" 0.5 ML MISC 1 each by Does not apply route daily 12/3/19   TRAY Pete CNP        Allergies:  Lisinopril     Review of Systems:   12 point ROS negative in all areas as listed below except as in [address]  Constitutional, EENT, Cardiovascular, pulmonary, GI, , Musculoskeletal, skin, neurological, hematological, endocrine, Psychiatric    Physical Examination:    Vitals:    04/22/21 0841   BP: 114/72   Pulse: 88   Resp: 16   Temp: 97.2 °F (36.2 °C)   SpO2: 95%    Weight: 207 lb 8 oz (94.1 kg)         General Appearance:  Alert, cooperative, no distress, appears stated age   Head:  Normocephalic, without obvious abnormality, atraumatic   Eyes:  PERRL, conjunctiva/corneas clear       Nose: Nares normal, no drainage or sinus tenderness   Throat: Lips, mucosa, and tongue normal   Neck: Supple, symmetrical, trachea midline, no adenopathy, thyroid: not enlarged, symmetric, no tenderness/mass/nodules, no carotid bruit or JVD       Lungs:   Clear to auscultation bilaterally, respirations unlabored   Chest Wall:  No tenderness or deformity   Heart:  Regular rate and rhythm, S1, S2 normal, no murmur, rub or gallop   Abdomen:   Soft, non-tender, bowel sounds active all four quadrants,  no masses, no organomegaly           Extremities: Extremities normal, atraumatic, no cyanosis; trace BLE edema;  +sores scattered   Pulses: 2+ and symmetric   Skin: Skin color, texture, turgor normal, no rashes or lesions   Pysch: Normal mood and affect   Neurologic: Normal gross motor and sensory exam.         Labs  CBC:   Lab Results   Component Value Date    WBC 15.3 04/22/2021    RBC 5.01 04/22/2021    HGB 15.0 04/22/2021    HCT 44.0 04/22/2021    MCV 87.9 04/22/2021    RDW 14.2 04/22/2021     04/22/2021     CMP:    Lab Results   Component Value Date     04/22/2021    K 3.4 04/22/2021    CL 73 04/22/2021    CO2 36 04/22/2021    BUN 58 04/22/2021    CREATININE 1.7 04/22/2021    GFRAA 37 04/22/2021    AGRATIO 1.0 04/21/2021    LABGLOM 31 04/22/2021    GLUCOSE 272 04/22/2021    PROT 8.9 04/21/2021    CALCIUM 9.6 04/22/2021    BILITOT 0.6 04/21/2021    ALKPHOS 224 04/21/2021    AST 17 04/21/2021    ALT 13 04/21/2021     PT/INR:  No results found for: PTINR  Lab Results   Component Value Date    TROPONINI 0.07 (H) 04/21/2021       EKG: 4/21/21  Sinus rhythmPremature atrial complexesRight bundle branch blockLeft anterior fascicular blockleft axisSeptal infarct (cited on or before 28-FEB-2020)Nonspecific ST abnormalityAbnormal ECGWhen compared with ECG of 16-MAR-2021 11:35,Premature atrial complexes are now PresentConfirmed by GUNJAN Nichols MD ([id number]) on 4/22/2021 8:52:08 AM     ECHO 8/18/20  Summary   Limited only f/u for LVEF and mitral regurgitation. Technically difficult examination. The left ventricular systolic function is severely reduced with an ejection   fraction of 25%. There is hypokinesis of the apex, apical lateral, apical septum,   anterioseptum and anterior walls. Compared to previous study from 7-1-2020 no changes noted in left   ventricular function. Moderate mitral regurgitation. STRESS MPI 8/22/20:    Summary    Calculated LVEF is not reliable on study which is technically difficult, estimate of 51% is likely an overestimate     Mid-distal anteroseptal/apical akinesis    Large fixed defects in anteroseptal/apical walls    Mild to moderate reversibility in lateral walls consistent with ischemia    Overall, this would be considered an abnormal high risk study     Cardiac Imaging:  VL BILAT VENOUS U/S 1/13/21  Within the limits of this exam, there is no evidence of deep or superficial    venous thrombosis in the lower extremities bilaterally. Occluded arterial stent involving the ostial/proximal segment of the left    superficial femoral artery with monophasic dampened flow returning just    beyond the stent at the proximal segment of the thigh. VASCULAR PROCEDURE:   DATE OF PROCEDURE:  09/01/2020   PREOPERATIVE DIAGNOSES:  Left subclavian artery stenosis with coronary steal syndrome. POSTOPERATIVE DIAGNOSES:  Left subclavian artery stenosis with coronary steal syndrome. PROCEDURES PERFORMED:  1. Selective left subclavian angiogram.  2.  Placement of catheter in the subclavian artery.   3.  Angioplasty and stenting of the subclavian artery. 4.  Left upper extremity angiogram.    CARDIAC CATH 8/27/20:   FINDINGS   HEMODYNAMICS   CHAMBER PRESSURE SATURATION   RA  15  54 %   RV  62/16     PA  69/20  51 %   PW  19 with V waves to 25     AORTA  118/63  94 %      HERMILA CARDIAC OUTPUT  5.4 L/min   SVR  1075 L/min   PVR  343 L/min      Left subclavian angiogram shows proximal 95 to 99% stenosis. There appears to be retrograde filling of the left subclavian artery by way of the LIMA and there appears to be coronary steal.  The vertebral arteries not well visualized. Left heart catheterization   LVEDP  22   GRADIENT ACROSS AORTIC VALVE  none      CORONARY ARTERIES   LM  Proximal less than 10% stenosis, mid 10 to 20% stenosis, distal 70% stenosis. Overall similar appearance to prior angiograms         LAD  Ostial 70% stenosis, the LAD is extensively stented in the proximal and mid segments, the stents appear to be widely patent with 20% in-stent restenosis, distal vessel has less than 10% stenosis. There is retrograde filling into the LIMA to LAD graft that fills the left subclavian artery. D1 is a small vessel with ostial 90% stenosis. Overall similar appearance to prior angiograms         LCX  Circumflex is stented throughout the proximal and mid and distal segments into OM 1 which is a large vessel. Stents are widely patent with 10 to 20% in-stent restenosis. The stents jailed the AV groove circumflex and at the mid AV groove circumflex, there is a 70 to 80% stenosis. RCA Dominant, medium vessel, heavily calcified, proximal-mid 60 to 70% stenosis, distal less than 10% stenosis, overall similar angiographically as compared to prior angiograms.       BYPASS GRAFTS   LIMA-LAD  Visualized nonselectively through retrograde filling from the native LAD, it is not visualized with antegrade flow from the left subclavian artery injection it appears to be widely patent with less than 10% [id number] stenosis. CONCLUSIONS:   Elevated filling pressures, continue diuresis  CardioMEMS had good correlation with the right heart catheterization numbers. Severe pulmonary hypertension  Patent LIMA to LAD graft however there appears to be coronary steal phenomenon due to severe left subclavian stenosis  We will consider left subclavian intervention  Will obtain follow-up vascular ultrasound to assess this  We will consult with CT surgery regarding therapeutic options       Surgery: 1/27/20 Urgent coronary artery bypass grafting surgery x1 with pedicled left internal mammary artery to the LAD. Cardiopulmonary bypass with on-pump beating-heart technique, no cross-clamp. Transesophageal echo. Epiaortic ultrasound. [place] catheter placement. Doppler verification of grafts. Bilateral five-level intercostal nerve block with Exparel. Platelet gel application. Sternal plating. Vas-Cath placement. CARDIAC CATH 1/20/2020:   Left Heart Cath  Dominance : Right     LM: 70-80% short distal stenosis     LAD: 70-80% short ostial stenosis, extending into takeoff of high first diagonal; large proximal to mid stented segment patent with jailed second diagonal  (80% ostial D2) and 70% in stent restenosis of most distal stent; distal to near apical LAD with minimal disease     LCx: 70-80% short ostial stenosis, prior to patent proximal to mid stents      RCA: large, dominant vessel with long 60% proximal to mid stenosis      LVEDP: 4 mmHG  LVG not done due to CKD. Impression/Recommendations:  Diabetic with previous LAD and LCx stenting in 2018 returns with unstable angina, in stent restenosis and Left Main bifurcation disease. Recommend CTS evaluation inpatient to discuss surgical revascularization option. Hold Clopidogrel. Assessment:  Alan Kirkpatrick is a 60 y.o. patient who presented to [facility] 4/21/21 with c/o shaking and multiple other complaints.   She has PMH: CAD s/p PCI to LAD and LCx 2018, s/p 1V CABG (LIMA to LAD,Jan 2020 for restenosis of LAD and LCCx and significant dz in RCA), ischemic CM SC=68%; chronic systolic CHF s/p CardioMEMs implant (2/2019), MR, PAD, CKD, DM, COPD, andleg wounds. Patient follows with NP Preethi Han at our [place] office. Last OV 2/2021. Most recent Echo 8/18/20 EF 25%, moderate MR. Most recent lexiscan myoview stress test 8/22/20 showed large fixed defects in anteroseptal/apical walls, mild to mod reversibility in lateral walls consistent with ischemia. Note admit January 2021 with edema, increased weight and SOB. She was diuresed with IV Lasix and PO metolazone. Diuretic regimen on d/c included torsemide 100 mg daily, spironolactone 50 mg daily, and metolazone 5 mg Monday Wednesday and Fridays. She is on low-dose beta-blocker though no ACE or ARB due to renal insufficiency and hypotension requiring midodrine. Now presents with multiple complaints including shaking, mid-back pain, weakness, fatigue, and leg cramps. She has not missed diuretics. Admit EKG 4/21/21 showed SR, PAC's, RBBB, LAFB, left axis septal infarct, nonspecific ST abnormality (PAC's new compared to 3/21 EKG). CXR with ? Lingular infiltrate. K+ 2.5, QYL=7657; Shady 0.07, 0.07. Diagnosis of hypokalemia and LLE cellulitis in middle-aged female with hx CAD s/p stents and 1V CABG, ischemic CM EF=25%, and hx chronic systolic CHF. Recs:  1. Note I do not believe she has decompensated CHF. No volume overload on exam.   2. Concern for overdiuresis given hypokalemia. 3. Would hold diuretics for now  4. Nephrology consult  5. Continue baby asa, lipitor 80mg qd, coreg 3.125mg BID, midodrine 5mg BID, plavix 75mg qd. 6. No ACE-I due to renal issues and problems with hypotension in past.  7. No need for cardiac testing at this time.      Patient Active Problem List   Diagnosis    Type 2 diabetes mellitus with diabetic polyneuropathy, with long-term current use of insulin ([address]    Essential hypertension    CLINT (obstructive sleep apnea)    Tobacco abuse disorder    GERD (gastroesophageal reflux disease)    Anxiety and depression    Hyponatremia    Iron deficiency anemia    CAD (coronary artery disease)    Mitral valve regurgitation    COPD (chronic obstructive pulmonary disease) (HCC)    SHAUNNA (acute kidney injury) (HCC)    Vitamin D deficiency    Dyslipidemia    Abel's esophagus    Acute on chronic congestive heart failure (HCC)    Viral hepatitis C    Pulmonary nodule    Class 2 severe obesity due to excess calories with serious comorbidity and body mass index (BMI) of 39.0 to 39.9 in adult ([address]    Bipolar disorder ([address]    Pulmonary hypertension (HCC)    Bilateral lower extremity edema    Habitual self-excoriation    Ulcer of left lower leg, limited to breakdown of skin (HCC)    Ulcer of right leg, limited to breakdown of skin ([address]    Arthritis    Cardiomyopathy ([address]    Chronic systolic heart failure ([address]    Peripheral venous insufficiency    Atherosclerosis of native artery of both lower extremities with bilateral ulceration of calves ([address]    Acute kidney injury superimposed on CKD ([address]    Morbid obesity with BMI of 40.0-44.9, adult (HCC)    CKD (chronic kidney disease) stage 4, GFR 15-29 ml/min (HCC)    Hypokalemia    Dyspnea on exertion    Ischemic heart disease    Moderate protein-calorie malnutrition (HCC)    DKA, type 2, not at goal [facility])    Acute on chronic systolic CHF (congestive heart failure) (HCC)    Acute systolic CHF (congestive heart failure) (HCC)    CHF (congestive heart failure), NYHA class I, acute on chronic, combined ([address]    Cellulitis       Thank you for allowing to us to participate in the [facility] or [employer]. Further evaluation will be based upon the patient's clinical course and testing results.

## 2021-04-22 NOTE — ED PROVIDER NOTES
[facility] EMERGENCY DEPARTMENT      CHIEF COMPLAINT  Shoulder Pain (pt states right shoulder pain for months already seeing ortho, has MRI ordered. ), Shortness of Breath (all the time, sees pulmonary at [facility]), Cough (X3 months already on antibiotics and inhaler), Foot Swelling (for weeks), and Spasms (pt states \"I am always here for this\")       HISTORY OF PRESENT ILLNESS  Zac Whitmore is a 60 y.o. female  who presents to the ED complaining of multiple complaints. Patient states that she has been having cramps in her legs and is worried that her potassium may be abnormal.  She took 4 potassium pills earlier today because of that. She has noticed swelling in her bilateral lower extremities but states that today her left lower extremity is more red than it has been. She has been previously managed through the wound care center but states that she \"quit\" there practice although does feel that she likely needs to reestablish herself with them and go back due to the wounds on her left lower extremity which have not healed like the ones on the right lower extremity. She denies any known fevers. No vomiting or diarrhea. She has been drinking fluids but states that she is to watch, she drinks because then it will get worse as far as her lower extremity edema. She has been trying to drink Pedialyte. She has also been having right shoulder pain but states that she is seeing orthopedist for this and they are currently managing that. She has not taken anything for pain. She states that she feels short of breath all the time but is not typically on oxygen. She has been having cough productive of sputum for the past 3 months. She is currently on antibiotics per her report. No chest pain. Her significant other is concerned about her blood sugars possibly being abnormal.    No other complaints, modifying factors or associated symptoms. I have reviewed the following from the nursing documentation.     Past Medical History:   Diagnosis Date    Acute blood loss anemia 8/5/2020    Acute kidney injury ([address] 12/25/2019    Acute kidney injury superimposed on CKD ([address] 8/5/2020    Acute on chronic combined systolic and diastolic congestive heart failure ([address] 1/10/2020    Acute on chronic right-sided heart failure ([address] 08/2020    Alcohol dependence ([address] 3/10/2010    Arthritis     Asthma     CAD (coronary artery disease)     Cellulitis 6/3/2020    COPD (chronic obstructive pulmonary disease) (HCC)     Diabetic ulcer of left foot associated with type 2 diabetes mellitus, limited to breakdown of skin ([address] 1/18/2020    Diabetic ulcer of toe of right foot associated with type 2 diabetes mellitus ([address] 1/18/2020    Left renal artery stenosis ([address] 08/2020    MI (myocardial infarction) ([address] 10/07/2019    NSTEMI    Positive FIT (fecal immunochemical test) 2/28/2020    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 steal syndrome 09/01/2020    Traumatic perinephric hematoma, left, initial encounter 8/4/2020     Past Surgical History:   Procedure Laterality Date    ARTERIAL BYPASS SURGRY Left 01/06/2016    Axillary/Subclavian to Brachial Bypass w/reversed SVG    CARDIAC CATHETERIZATION  03/27/2018    Dr. Annette Mena - at [address] [place]  01/20/2020    Dr. Kitty Bland      pancreatitis post surgery    CORONARY ANGIOPLASTY WITH STENT PLACEMENT  03/16/2018    MELODY- 3.5 x 38 and 3.5 x 20 to Cx    CORONARY ANGIOPLASTY WITH STENT PLACEMENT  01/03/2018    MELODY- 3.0 x 38 and 2.75 x 26 and 2.75 x 8 and 2.75 x 22 to the LAD    CORONARY ANGIOPLASTY WITH STENT PLACEMENT  10/07/2019    MELODY- 2.5 x 8 to mLAD    CORONARY ARTERY BYPASS GRAFT N/A 1/27/2020    CORONARY ARTERY BYPASS GRAFTING X 1, ON PUMP BEATING HEART, INTERNAL MAMMARY ARTERY TO LEFT ANTERIOR DESCENDING ARTERY, VAS CATH INSERTION, URI, PLATELET GEL APPLICATION, 5 LEVEL BILATERAL INTERCOSTAL NERVE BLOCK, STERNAL PLATING performed by Nanci Ornelas MD at [address] Right 5/16/2019    fem-pop, performed by Karie Melo MD at [address]  02/14/2019    CardioMEMs insertion for CHF    ROTATOR CUFF REPAIR Left 04/22/2016    TONSILLECTOMY      TRANSESOPHAGEAL ECHOCARDIOGRAM  01/26/2020    TRANSLUMINAL ANGIOPLASTY Left 10/08/2019    with stenting, at [facility]    TRANSLUMINAL ANGIOPLASTY Left 04/29/2020    of the [facility] re-occlusion    TUMOR EXCISION      benign tumors X 2 chest & axilla    UPPER GASTROINTESTINAL ENDOSCOPY  4/25/2013    BX barretts, HH, gastritis    UPPER GASTROINTESTINAL ENDOSCOPY  12/10/2015    UPPER GASTROINTESTINAL ENDOSCOPY  01/06/2017    Gastritis    VASCULAR SURGERY Left 12/08/2015    upper extremity and mesenteric angiogram (diagnostic only)    VASCULAR SURGERY Bilateral 07/07/2020    diagnostic lower extremity angiogram only    VASCULAR SURGERY Left 08/04/2020    angioplasty and stent of renal artery    VASCULAR SURGERY Left 08/05/2020    coil embolization of branch renal artery vessel for bleeding    VASCULAR SURGERY Left 09/01/2020    angioplasty and stenting of subclavian artery     Family History   Problem Relation Age of Onset    Heart Disease Maternal Grandmother     Cancer Mother         lung and brain cancer    Cancer Maternal Aunt         lung and brain cancer     Social History     Socioeconomic History    Marital status: Single     Spouse name: Not on file    Number of children: Not on file    Years of education: Not on file    Highest education level: Not on file   Occupational History    Not on file   Social Needs    Financial resource strain: Not on file    Food insecurity     Worry: Not on file     Inability: Not on file    Transportation needs     Medical: Not on file     Non-medical: Not on file   Tobacco Use    Smoking status: Current Every Day Smoker     Packs/day: 1.00     Years: 30.00     Pack years: 30. 00     Types: Cigarettes    Smokeless tobacco: Never Used    Tobacco comment: 0.5ppd as of 2/22/21   Substance and Sexual Activity    Alcohol use: No     Comment: quit 2006     Drug use: Never    Sexual activity: Yes     Partners: Male   Lifestyle    Physical activity     Days per week: Not on file     Minutes per session: Not on file    Stress: Not on file   Relationships    Social connections     Talks on phone: Not on file     Gets together: Not on file     Attends Hindu service: Not on file     Active member of club or organization: Not on file     Attends meetings of clubs or organizations: Not on file     Relationship status: Not on file    Intimate partner violence     Fear of current or ex partner: Not on file     Emotionally abused: Not on file     Physically abused: Not on file     Forced sexual activity: Not on file   Other Topics Concern    Not on file   Social History Narrative    Not on file     Current Facility-Administered Medications   Medication Dose Route Frequency Provider Last Rate Last Admin    potassium chloride 10 mEq/100 mL IVPB (Peripheral Line)  10 mEq Intravenous Q1H Kelli Quevedo  mL/hr at 04/21/21 2306 10 mEq at 04/21/21 2306    vancomycin (VANCOCIN) 2,000 mg in dextrose 5 % 500 mL IVPB  20 mg/kg Intravenous Once Kelli Quevedo MD        HYDROmorphone (DILAUDID) injection 1 mg  1 mg Intravenous Q1H PRN Kelli Quevedo MD         Current Outpatient Medications   Medication Sig Dispense Refill    budesonide-formoterol (SYMBICORT) 160-4.5 MCG/ACT AERO Inhale 2 puffs into the lungs 2 times daily 1 Inhaler 5    doxycycline hyclate (VIBRA-TABS) 100 MG tablet Take 1 tablet by mouth 2 times daily for 7 days 14 tablet 0    potassium chloride (KLOR-CON M) 20 MEQ extended release tablet Take 1 tablet by mouth daily 30 tablet 0    torsemide (DEMADEX) 100 MG tablet Take 1 tablet by mouth daily 90 tablet 1    nitroGLYCERIN (NITROSTAT) 0.4 MG SL tablet Place 1 tablet under the tongue every 5 minutes as needed for Chest pain up to max of 3 total doses. If no relief after 1 dose, call 911. 25 tablet 0    Insulin Degludec (TRESIBA FLEXTOUCH) 100 UNIT/ML SOPN Inject 30 Units into the skin nightly 10 pen 5    Insulin Pen Needle (B-D ULTRAFINE III SHORT PEN) 31G X 8 MM MISC Five shots a day 200 each 5    insulin lispro (HUMALOG KWIKPEN U-200) 200 UNIT/ML SOPN pen Inject 10 Units into the skin 3 times daily (before meals) 2 pen 5    metOLazone (ZAROXOLYN) 5 MG tablet Take 1 tablet by mouth three times a week On Mon Weds and Friday 1 tablet 0    carvedilol (COREG) 3.125 MG tablet TAKE 1 TABLET BY MOUTH TWICE A DAY WITH MEALS 180 tablet 3    atorvastatin (LIPITOR) 80 MG tablet TAKE 1 TABLET BY MOUTH EVERY DAY AT NIGHT 90 tablet 3    doxepin (SINEQUAN) 25 MG capsule TAKE 1 CAPSULE BY MOUTH EVERY DAY AT NIGHT      KLOR-CON M20 20 MEQ extended release tablet TAKE 2 TABLETS BY MOUTH 4 TIMES DAILY 720 tablet 1    methocarbamol (ROBAXIN) 500 MG tablet Take 500 mg by mouth 2 times daily      midodrine (PROAMATINE) 5 MG tablet Take 5 mg by mouth 2 times daily      spironolactone (ALDACTONE) 50 MG tablet Take 50 mg by mouth daily      Blood Glucose Monitoring Suppl (FREESTYLE FREEDOM LITE) w/Device KIT Use to test glucose 4 times a day 1 kit 0    blood glucose test strips (FREESTYLE LITE) strip USE TO CHECK BLOOD GLUCOSE LEVELS FOUR TIMES DAILY 200 strip 10    tiotropium (SPIRIVA RESPIMAT) 2.5 MCG/ACT AERS inhaler INHALE 2 PUFFS INTO THE LUNGS DAILY 1 Inhaler 6    traZODone (DESYREL) 100 MG tablet Take 100 mg by mouth nightly as needed for Sleep Can take 1 or 2 tabs nightly as directed      albuterol sulfate  (90 Base) MCG/ACT inhaler Inhale 2 puffs into the lungs every 6 hours as needed for Wheezing or Shortness of Breath 1 Inhaler 3    buprenorphine-naloxone (SUBOXONE) 8-2 MG FILM SL film Place 1 Film under the tongue 2 times daily.       benztropine (COGENTIN) 1 MG the calcaneus. There is mild irregular bony deformity of the navicular bone which is more prominent. Questionable subacute or chronic fracture of the navicular bone. Recommend a follow-up left foot series. Mild soft tissue swelling around the ankle and diffuse osteopenia with no obvious ankle fracture seen. Xr Foot Left (min 3 Views)    Result Date: 4/21/2021  EXAMINATION: THREE XRAY VIEWS OF THE LEFT FOOT 4/21/2021 10:53 pm COMPARISON: 04/27/2020 HISTORY: ORDERING SYSTEM PROVIDED HISTORY: ?navicular fx on ankle TECHNOLOGIST PROVIDED HISTORY: Reason for exam:->?navicular fx on ankle Reason for Exam: Foot Swelling (for weeks) Acuity: Acute Type of Exam: Initial FINDINGS: The navicular bone is slightly subluxed medial to the head of the talus and there is bony irregularity along the medial aspect of the navicular bone which is suspicious for chronic or subacute fracture. The tarsal bones are otherwise intact. There is bony spurring along the calcaneus. There is mild soft tissue swelling along the mid and distal foot. Probable subacute or chronic fracture of the navicular bone with associated mild talonavicular subluxation. Recommend orthopedic consultation and suggest a CT scan of the foot to further characterize the area. Bony spurring along the calcaneus. Xr Chest Portable    Result Date: 4/21/2021  EXAMINATION: ONE XRAY VIEW OF THE CHEST 4/21/2021 9:18 pm COMPARISON: 01/11/2011 HISTORY: ORDERING SYSTEM PROVIDED HISTORY: sob TECHNOLOGIST PROVIDED HISTORY: Reason for exam:->sob Reason for Exam: Cough (X3 months already on antibiotics and inhaler) Acuity: Acute Type of Exam: Initial FINDINGS: Monitor wires overlie the chest. The patient has had a median sternotomy. . The trachea is midline. There is stable cardiomegaly. The right lung is clear. There is density in the left lung base which is concerning for a lingular infiltrate. Followup to resolution is suggested.      Silhouetting of the left heart border suggestive of a lingular infiltrate. Followup to resolution is suggested. ED COURSE/MDM  Patient seen and evaluated. Old records reviewed. Labs and imaging reviewed and results discussed with patient. Patient presenting for evaluation of multiple complaints and noted to have a left lower extremity cellulitis with associated leukocytosis. Patient with chronic renal insufficiency and no renal dysfunction secondary to the sepsis. She was given vancomycin and cefepime for coverage of her cellulitis and also for coverage of possible lingular infiltrate that is noted on x-ray. She does have chronic renal insufficiency that actually is somewhat improved today. However, she does have marked hypokalemia. No chest pain at all. EKG unchanged from her previous EKG in March but she does have elevated troponin at 0.07. She is a chronic mild troponin elevation and I did repeat a troponin to ensure that this was not increasing in any way and her repeat troponin stable at 0.07. Her BNP is slightly elevated compared to her most recent value, currently at 2961. She does have bilateral lower extremity edema noted in addition to the cellulitis as well as some ulcerations of the lower extremities especially 1 noted to the anterior left lower extremity with a surrounding cellulitis. She has a possible navicular fracture noted on initial ankle x-ray and a foot series was ordered and recommended to have orthopedic consultation which I feel could be obtained when she is over at [facility], as at this time I do feel that inpatient admission for further IV antibiotics and treatment of her cellulitis as well as hypokalemia is most appropriate. She has noted hyponatremia, although improved from more recent values. I spoke with Dr. Cecil Mills. We thoroughly discussed the history, physical exam, laboratory and imaging studies, as well as, emergency department course.  Based upon that discussion, we've decided to admit Roxana Miller for further observation and evaluation of Diego Bautista's cellulitis. As I have deemed necessary from their history, physical, and studies, I have considered and evaluated Roxana Miller for the following diagnoses: DEEP SPACE INFECTIONS, DEEP VENOUS THROMBOSIS, KERLINE'S GANGRENE, SEPSIS, and TOXIC SHOCK SYNDROME. The total Critical Care time is 50 minutes which excludes separately billable procedures. During the patient's ED course, the patient was given:  Medications   potassium chloride 10 mEq/100 mL IVPB (Peripheral Line) (10 mEq Intravenous New Bag 4/21/21 2306)   vancomycin (VANCOCIN) 2,000 mg in dextrose 5 % 500 mL IVPB (has no administration in time range)   HYDROmorphone (DILAUDID) injection 1 mg (has no administration in time range)   ceFEPIme (MAXIPIME) 2,000 mg in sterile water 20 mL IV syringe (2,000 mg Intravenous Given 4/21/21 2159)        CLINICAL IMPRESSION  1. Cellulitis of left lower extremity    2. Closed nondisplaced fracture of navicular bone of left foot, initial encounter    3. Hypokalemia    4. Lower extremity edema    5. Chronic renal impairment, unspecified CKD stage    6. Lung infiltrate    7. Hyponatremia        Blood pressure 117/74, pulse 81, temperature 98.3 °F (36.8 °C), temperature source Oral, resp. rate 16, height 5' 4\" (1.626 m), weight 230 lb (104.3 kg), last menstrual period 10/24/2012, SpO2 96 %, not currently breastfeeding. DISPOSITION  Roxana Miller was admitted in stable condition. DISCLAIMER: This chart was created using Dragon dictation software. Efforts were made by me to ensure accuracy, however some errors may be present due to limitations of this technology and occasionally words are not transcribed correctly.         Ronnie Mcfadden MD  04/22/21 [id number]

## 2021-04-22 NOTE — PROGRESS NOTES
Pt in bed, eyes closed. No distress noted. Call light in reach. Will continue to monitor.   Marcella Hill

## 2021-04-22 NOTE — PROGRESS NOTES
Consult given to Sudeep Henry with Cardiology, Dr Jackson Ledezma is covering. [birth year]-33 @[facility].  Bernardamari Jacinto

## 2021-04-22 NOTE — CONSULTS
[facility]    Consult Note           Reason for Consult: Hyponatremia, hypokalemia  Requesting Physician:  Dr. Angela England    Chief Complaint:    Chief Complaint   Patient presents with    Shoulder Pain     pt states right shoulder pain for months already seeing ortho, has MRI ordered.  Shortness of Breath     all the time, sees pulmonary at [facility]     X3 months already on antibiotics and inhaler    Foot Swelling     for weeks    Spasms     pt states \"I am always here for this\"     History of Present Illness on 4/22/2021:    60 y.o. yo female with PMH of COPD, CHF combined, chronic kidney disease stage III with baseline creatinine of 1.3-1.6 along with history of left renal artery stenosis  who is admitted for altered mental status, and nephrology has been consulted for electrolyte disorder  Patient reports that she was 216 pounds last week but now is 207 pounds  She is on multiple medications doxepin and Abilify  Patient sodium was 122 on 3/15 and 16.    Her admission sodium was 128 and dropped to 120 at 8 AM on 4/22  Potassium was 2.5 on admission and is up to 3.4    Past Medical History:        Diagnosis Date    Acute blood loss anemia 8/5/2020    Acute kidney injury ([address] 12/25/2019    Acute kidney injury superimposed on CKD ([address] 8/5/2020    Acute on chronic combined systolic and diastolic congestive heart failure ([address] 1/10/2020    Acute on chronic right-sided heart failure ([address] 08/2020    Alcohol dependence ([address] 3/10/2010    Arthritis     Asthma     CAD (coronary artery disease)     Cellulitis 6/3/2020    COPD (chronic obstructive pulmonary disease) ([address]     Diabetic ulcer of left foot associated with type 2 diabetes mellitus, limited to breakdown of skin ([address] 1/18/2020    Diabetic ulcer of toe of right foot associated with type 2 diabetes mellitus ([address] 1/18/2020    Left renal artery stenosis ([address] 08/2020    MI (myocardial infarction) ([address] 10/07/2019    NSTEMI    Positive FIT (fecal immunochemical test) 2/28/2020    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 steal syndrome 09/01/2020    Traumatic perinephric hematoma, left, initial encounter 8/4/2020       Past Surgical History:        Procedure Laterality Date    ARTERIAL BYPASS SURGRY Left 01/06/2016    Axillary/Subclavian to Brachial Bypass w/reversed SVG    CARDIAC CATHETERIZATION  03/27/2018    Dr. Teofilo Cohen - at [address] [place]  01/20/2020    Dr. Kurt Pemberton      pancreatitis post surgery    CORONARY ANGIOPLASTY WITH STENT PLACEMENT  03/16/2018    MELODY- 3.5 x 38 and 3.5 x 20 to Cx    CORONARY ANGIOPLASTY WITH STENT PLACEMENT  01/03/2018    MELODY- 3.0 x 38 and 2.75 x 26 and 2.75 x 8 and 2.75 x 22 to the LAD    CORONARY ANGIOPLASTY WITH STENT PLACEMENT  10/07/2019    MELODY- 2.5 x 8 to [address] GRAFT N/A 1/27/2020    CORONARY ARTERY BYPASS GRAFTING X 1, ON PUMP BEATING HEART, INTERNAL MAMMARY ARTERY TO LEFT ANTERIOR DESCENDING ARTERY, VAS CATH INSERTION, URI, PLATELET GEL APPLICATION, 5 LEVEL BILATERAL INTERCOSTAL NERVE BLOCK, STERNAL PLATING performed by Vikki Hartley MD at [address] Right 5/16/2019    fem-pop, performed by Bridgette Thomason MD at [address]  02/14/2019    CardioMEMs insertion for CHF    ROTATOR CUFF REPAIR Left 04/22/2016    TONSILLECTOMY      TRANSESOPHAGEAL ECHOCARDIOGRAM  01/26/2020    TRANSLUMINAL ANGIOPLASTY Left 10/08/2019    with stenting, at [facility]    [facility] [address] Left 04/29/2020    of the [facility] re-occlusion    TUMOR EXCISION      benign tumors X 2 chest & axilla    UPPER GASTROINTESTINAL ENDOSCOPY  4/25/2013    BX barretts, HH, gastritis    UPPER GASTROINTESTINAL ENDOSCOPY  12/10/2015    UPPER GASTROINTESTINAL ENDOSCOPY  01/06/2017    Gastritis    VASCULAR SURGERY Left 12/08/2015    upper extremity and mesenteric angiogram (diagnostic only)    VASCULAR SURGERY Bilateral 07/07/2020    diagnostic lower extremity angiogram only    VASCULAR SURGERY Left 08/04/2020    angioplasty and stent of renal artery    VASCULAR SURGERY Left 08/05/2020    coil embolization of branch renal artery vessel for bleeding    VASCULAR SURGERY Left 09/01/2020    angioplasty and stenting of subclavian artery       Home Medications:    No current facility-administered medications on file prior to encounter. Current Outpatient Medications on File Prior to Encounter   Medication Sig Dispense Refill    QUEtiapine (SEROQUEL XR) 50 MG extended release tablet Take 1 mg by mouth nightly Not sure of dose      budesonide-formoterol (SYMBICORT) 160-4.5 MCG/ACT AERO Inhale 2 puffs into the lungs 2 times daily 1 Inhaler 5    torsemide (DEMADEX) 100 MG tablet Take 1 tablet by mouth daily 90 tablet 1    nitroGLYCERIN (NITROSTAT) 0.4 MG SL tablet Place 1 tablet under the tongue every 5 minutes as needed for Chest pain up to max of 3 total doses.  If no relief after 1 dose, call 911. 25 tablet 0    Insulin Degludec (TRESIBA FLEXTOUCH) 100 UNIT/ML SOPN Inject 30 Units into the skin nightly 10 pen 5    insulin lispro (HUMALOG KWIKPEN U-200) 200 UNIT/ML SOPN pen Inject 10 Units into the skin 3 times daily (before meals) 2 pen 5    metOLazone (ZAROXOLYN) 5 MG tablet Take 1 tablet by mouth three times a week On Mon Weds and Friday 1 tablet 0    carvedilol (COREG) 3.125 MG tablet TAKE 1 TABLET BY MOUTH TWICE A DAY WITH MEALS 180 tablet 3    atorvastatin (LIPITOR) 80 MG tablet TAKE 1 TABLET BY MOUTH EVERY DAY AT NIGHT 90 tablet 3    doxepin (SINEQUAN) 25 MG capsule TAKE 1 CAPSULE BY MOUTH EVERY DAY AT NIGHT      KLOR-CON M20 20 MEQ extended release tablet TAKE 2 TABLETS BY MOUTH 4 TIMES DAILY 720 tablet 1    methocarbamol (ROBAXIN) 500 MG tablet Take 500 mg by mouth 2 times daily      midodrine (PROAMATINE) 5 MG tablet Take 5 mg by mouth 2 times daily years: 30.00     Types: Cigarettes    Smokeless tobacco: Never Used    Tobacco comment: 0.5ppd as of 2/22/21   Substance and Sexual Activity    Alcohol use: No     Comment: quit 2006     Drug use: Never    Sexual activity: Yes     Partners: Male   Lifestyle    Physical activity     Days per week: Not on file     Minutes per session: Not on file    Stress: Not on file   Relationships    Social connections     Talks on phone: Not on file     Gets together: Not on file     Attends Orthodoxy service: Not on file     Active member of club or organization: Not on file     Attends meetings of clubs or organizations: Not on file     Relationship status: Not on file    Intimate partner violence     Fear of current or ex partner: Not on file     Emotionally abused: Not on file     Physically abused: Not on file     Forced sexual activity: Not on file   Other Topics Concern    Not on file   Social History Narrative    Not on file       Family History:   Family History   Problem Relation Age of Onset    Heart Disease Maternal Grandmother     Cancer Mother         lung and brain cancer    Cancer Maternal Aunt         lung and brain cancer       Review of Systems:   Pertinent positives stated above in HPI. All other 10 systems were reviewed and were negative.      Physical exam:   Constitutional:  VITALS:  /72   Pulse 88   Temp 97.2 °F (36.2 °C) (Oral)   Resp 16   Ht 5' 4\" (1.626 m)   Wt 207 lb 8 oz (94.1 kg)   LMP 10/24/2012   SpO2 95%   BMI 35.62 kg/m²   Gen: alert, awake, nad  Skin: no rash, turgor wnl  Heent:  eomi, mmm  Neck: no bruits or jvd noted, thyroid normal  Cardiovascular:  S1, S2 without m/r/g; trace lower extremity edema  Respiratory: CTA B without w/r/r; respiratory effort normal  Abdomen:  +bs, soft, nt, nd, no hepatosplenomegaly  Neuro/Psy: AAoriented times 3 ; moves all 4 ext  Musculoskeletal:  Rom, muscular strength intact; digits, nails normal    Data/  Recent Labs 04/21/21 2056 04/22/21  0811   WBC 18.7* 15.3*   HGB 17.1* 15.0   HCT 49.3* 44.0   MCV 85.9 87.9    342     Recent Labs     04/21/21 2056 04/22/21  0811   * 120*   K 2.5* 3.4*   CL 68* 73*   CO2 42* 36*   GLUCOSE 139* 272*   MG 1.90 1.90   BUN 55* 58*   CREATININE 1.7* 1.7*   LABGLOM 31* 31*   GFRAA 37* 37*       Assessment  -Hyponatremia, patient seems to be volume depleted at this time. She is also on multiple medications which can cause increased ADH like doxepin. With her weight loss continued use of metolazone, this is likely reason for her volume depletion   Sodium of 120, when corrected for blood sugar is close to about 124 at 8 AM on 4/22    -Hypokalemia    -Metabolic alkalosis from over diuresis    -Hypercalcemia present on admission, resolved    related to over diuresis    -Diabetes with hyperglycemia defer to primary team  -Chronic CHF combined, compensated  -Left ankle swelling/fracture Ortho consulted  -Chronic kidney disease stage IIIb with baseline creatinine of around 1.6    Plan  -Hold all diuretics  -Repeat BMP, IV fluid if needed based on results  -Fluid restriction  -Increase protein intake  -Check urine osmolality and electrolytes    Discussed with primary team and cardiology    Thank you the consultation. Please do not hesitate to call with questions.     Hai Moss  [facility]  Office: [phone]  Fax:    [phone]

## 2021-04-23 NOTE — DISCHARGE SUMMARY
Name:  Aria Jorgensen  Room:  /2879-01  MRN:    [id number]    Discharge Summary      This discharge summary is in conjunction with a complete physical exam done on the day of discharge. Attending Physician: Dr. Hortensia Vega  Discharging Physician: Dr. Wendie Talamantes: 4/21/2021  Discharge:  4/26/2021 5/18/2021    HPI:  The patient is a 60 y.o. female with DM, COPD, CAD, Chronic combined CHF, CKD who presented to [facility] ED with complaint of shaking. She states she was shaking. Patient is a poor historian. She c/o shortness of breath. She f/w Dr. Christy Lamar. She states her feet are swollen and her lower extremities are red. She used to go to the [address] [place] [address] and stopped going. She's had a cough for 3 months. She was given an inhaler and an antibiotic. Denies fevers, chills, chest pain, abdominal pain, nausea, vomiting, or diarrhea. She does not wear oxygen at home. She is on 2 L O2.       Labs with hyponatremia, hypokalemia, lactic acidosis, leukocytosis. Troponin 0.07, BNP 2,961. Creatinine at baseline. CXR with silhouetting of the left heart border. X-ray left foot/ankle with subacute/chronic fracture of navicular bone, associated mild talonavicular subluxation. Admitted to PCU on telemetry. Cardiology and nephrology consulted. Diagnoses this Admission and Hospital Course     Hyponatremia  - admitted to PCU on tele  - consulted  nephrology   - likely with CHF and fluid overload   - monitor labs. Improved from 120 to 126  - fluid restriction     Acute hypoxic respiratory failure  - likely with CHF   - she was requiring 2 L O2.    - weaned to RA.     Acute on chronic combined CHF  CAD s/p CABG   Ischemic cardiomyopathy  Severe pulmonary hypertension  Elevated troponin 0.07  - Cardiology consulted. - daily weight's; I&O's.  Low sodium diet  - Zaroxolyn Mon, Wed, Fri held initially    - continued aspirin, Plavix, Atorvastatin, Coreg.  -Her weight is down.  Nephrology wants to hold off Lasix.  She got ejection   fraction of 25%.   There is hypokinesis of the apex, apical lateral, apical septum,   anterioseptum and anterior walls.   Compared to previous study from 7-1-2020 no changes noted in left   ventricular function.   Moderate mitral regurgitation.       Discharge Medications     Medication List      CONTINUE taking these medications    ARIPiprazole 15 MG tablet  Commonly known as: ABILIFY     aspirin EC 81 MG EC tablet  Take 1 tablet by mouth daily     atorvastatin 80 MG tablet  Commonly known as: LIPITOR  TAKE 1 TABLET BY MOUTH EVERY DAY AT NIGHT     B-D ULTRAFINE III SHORT PEN 31G X 8 MM Misc  Generic drug: Insulin Pen Needle  Five shots a day     benztropine 1 MG tablet  Commonly known as: COGENTIN     budesonide-formoterol 160-4.5 MCG/ACT Aero  Commonly known as: Symbicort  Inhale 2 puffs into the lungs 2 times daily     busPIRone 30 MG tablet  Commonly known as: BUSPAR     carvedilol 3.125 MG tablet  Commonly known as: COREG  TAKE 1 TABLET BY MOUTH TWICE A DAY WITH MEALS     clopidogrel 75 MG tablet  Commonly known as: PLAVIX  TAKE 1 TABLET BY MOUTH EVERY DAY     doxepin 25 MG capsule  Commonly known as: SINEQUAN     FreeStyle Freedom Lite w/Device Kit  Use to test glucose 4 times a day     FREESTYLE LITE strip  Generic drug: blood glucose test strips  USE TO CHECK BLOOD GLUCOSE LEVELS FOUR TIMES DAILY     insulin lispro 200 UNIT/ML Sopn pen  Commonly known as: HUMALOG KWIKPEN U-200  Inject 10 Units into the skin 3 times daily (before meals)     INSULIN SYRINGE .5CC/29G 29G X 1/2\" 0.5 ML Misc  1 each by Does not apply route daily     Klor-Con M20 20 MEQ extended release tablet  Generic drug: potassium chloride  TAKE 2 TABLETS BY MOUTH 4 TIMES DAILY     lamoTRIgine 200 MG tablet  Commonly known as: LAMICTAL     magnesium oxide 400 (240 Mg) MG tablet  Commonly known as: MAG-OX  TAKE 1 TABLET ONCE DAILY     methocarbamol 500 MG tablet  Commonly known as: ROBAXIN     metOLazone 5 MG tablet  Commonly known as: ZAROXOLYN  Take 1 tablet by mouth three times a week On Mon Weds and Friday     midodrine 5 MG tablet  Commonly known as: PROAMATINE     nitroGLYCERIN 0.4 MG SL tablet  Commonly known as: NITROSTAT  Place 1 tablet under the tongue every 5 minutes as needed for Chest pain up to max of 3 total doses. If no relief after 1 dose, call 911. pantoprazole 40 MG tablet  Commonly known as: PROTONIX  Take 1 tablet by mouth 2 times daily     QUEtiapine 50 MG extended release tablet  Commonly known as: SEROQUEL XR     spironolactone 50 MG tablet  Commonly known as: ALDACTONE     Suboxone 8-2 MG Film SL film  Generic drug: buprenorphine-naloxone     tiotropium 2.5 MCG/ACT Aers inhaler  Commonly known as: Spiriva Respimat  INHALE 2 PUFFS INTO THE LUNGS DAILY     torsemide 100 MG tablet  Commonly known as: DEMADEX  Take 1 tablet by mouth daily     Tresiba FlexTouch 100 UNIT/ML Sopn  Generic drug: Insulin Degludec  Inject 30 Units into the skin nightly        STOP taking these medications    doxycycline hyclate 100 MG tablet  Commonly known as: VIBRA-TABS        ASK your doctor about these medications    cephALEXin 500 MG capsule  Commonly known as: KEFLEX  Take 1 capsule by mouth 3 times daily for 5 days  Ask about: Should I take this medication? Where to Get Your Medications      These medications were sent to [facility] [address] [place] [id number]    Phone: [phone]   · cephALEXin 500 MG capsule           Discharged in stable condition to home. Follow Up: Follow up with PCP.     Mohit Wilkinson MD, MD 5/18/2021 8:50 AM

## 2021-04-23 NOTE — PROGRESS NOTES
[facility]   Progress Note  Cardiology      HPI: Seeing Ms. Bautista today for f/u hx CAD, chronic systolic CHF, and ischemic CM with low K+ and LLE cellulitis on admit. She feels better and denies specific complaints. She wants to go home. Tele reviewed showing NSR 90bpm.      Physical Examination:    Vitals:    21 0722   BP: 103/68   Pulse: 94   Resp: 16   Temp: 97.3 °F (36.3 °C)   SpO2: 94%          Constitutional and General Appearance: NAD   Respiratory:  · Normal excursion and expansion without use of accessory muscles  · Resp Auscultation: Normal breath sounds without dullness  Cardiovascular:  · The apical impulses not displaced  · Heart tones are crisp and normal  · Cervical veins are not engorged  · The carotid upstroke is normal in amplitude and contour without delay or bruit  · Normal S1S2, No S3, +soft MONA  · Peripheral pulses are symmetrical and full  · There is no clubbing, cyanosis of the extremities.   · No edema; +open superficial wounds and scabs scattered BLE  · Pedal Pulses: 2+ and equal   Abdomen:  · No masses or tenderness  · Liver/Spleen: No Abnormalities Noted  Neurological/Psychiatric:  · Alert and oriented in all spheres  · Moves all extremities well  · No abnormalities of mood, affect, memory, mentation, or behavior are noted  Skin: warm and dry      Lab Results   Component Value Date    WBC 15.1 (H) 2021    HGB 14.5 2021    HCT 42.9 2021    MCV 89.0 2021     2021     Lab Results   Component Value Date    CREATININE 1.6 (H) 2021    BUN 59 (H) 2021     (L) 2021    K 3.6 2021    CL 80 (L) 2021    CO2 36 (H) 2021     Lab Results   Component Value Date    INR 1.04 2020    PROTIME 12.1 2020        EK21  Sinus rhythmPremature atrial complexesRight bundle branch blockLeft anterior fascicular blockleft axisSeptal infarct (cited on or before 2020)Nonspecific ST abnormalityAbnormal ECGWhen compared with ECG of 16-MAR-2021 11:35,Premature atrial complexes are now PresentConfirmed by Gerhardt Dama MD, Tasneem Rodríguez ([id number]) on 4/22/2021 8:52:08 AM      ECHO 8/18/20  Summary   Limited only f/u for LVEF and mitral regurgitation.   Technically difficult examination.  Antoinette Bowens left ventricular systolic function is severely reduced with an ejection   fraction of 25%.   There is hypokinesis of the apex, apical lateral, apical septum,   anterioseptum and anterior walls.   Compared to previous study from 7-1-2020 no changes noted in left   ventricular function.   Moderate mitral regurgitation.     STRESS MPI 8/22/20:    Summary    Calculated LVEF is not reliable on study which is technically difficult, estimate of 51% is likely an overestimate     Mid-distal anteroseptal/apical akinesis    Large fixed defects in anteroseptal/apical walls    Mild to moderate reversibility in lateral walls consistent with ischemia    Overall, this would be considered an abnormal high risk study      Cardiac Imaging:  VL BILAT VENOUS U/S 1/13/21  Within the limits of this exam, there is no evidence of deep or superficial    venous thrombosis in the lower extremities bilaterally.    Occluded arterial stent involving the ostial/proximal segment of the left    superficial femoral artery with monophasic dampened flow returning just    beyond the stent at the proximal segment of the thigh.      VASCULAR PROCEDURE:   DATE OF PROCEDURE:  09/01/2020   PREOPERATIVE DIAGNOSES:  Left subclavian artery stenosis with coronary steal syndrome.   POSTOPERATIVE DIAGNOSES:  Left subclavian artery stenosis with coronary steal syndrome.   PROCEDURES PERFORMED:  1.  Selective left subclavian angiogram.  2.  Placement of catheter in the subclavian artery. 3.  Angioplasty and stenting of the subclavian artery.   4.  Left upper extremity angiogram.     CARDIAC CATH 8/27/20:   FINDINGS   HEMODYNAMICS   CHAMBER PRESSURE SATURATION   RA  15  54 %   RV  62/16     PA  69/20  51 %   PW  19 with V waves to 25     AORTA  118/63  94 %      HERMILA CARDIAC OUTPUT  5.4 L/min   SVR  1075 L/min   PVR  343 L/min      Left subclavian angiogram shows proximal 95 to 99% stenosis.  There appears to be retrograde filling of the left subclavian artery by way of the LIMA and there appears to be coronary steal.  The vertebral arteries not well visualized.     Left heart catheterization   LVEDP  22   GRADIENT ACROSS AORTIC VALVE  none      CORONARY ARTERIES   LM  Proximal less than 10% stenosis, mid 10 to 20% stenosis, distal 70% stenosis.     Overall similar appearance to prior angiograms         LAD  Ostial 70% stenosis, the LAD is extensively stented in the proximal and mid segments, the stents appear to be widely patent with 20% in-stent restenosis, distal vessel has less than 10% stenosis.  There is retrograde filling into the LIMA to LAD graft that fills the left subclavian artery.     D1 is a small vessel with ostial 90% stenosis.     Overall similar appearance to prior angiograms         LCX  Circumflex is stented throughout the proximal and mid and distal segments into OM 1 which is a large vessel.  Stents are widely patent with 10 to 20% in-stent restenosis.  The stents jailed the AV groove circumflex and at the mid AV groove circumflex, there is a 70 to 80% stenosis.           RCA Dominant, medium vessel, heavily calcified, proximal-mid 60 to 70% stenosis, distal less than 10% stenosis, overall similar angiographically as compared to prior angiograms.      BYPASS GRAFTS   LIMA-LAD  Visualized nonselectively through retrograde filling from the native LAD, it is not visualized with antegrade flow from the left subclavian artery injection it appears to be widely patent with less than 10% [id number] stenosis.      CONCLUSIONS:   Elevated filling pressures, continue diuresis  CardioMEMS had good correlation with the right heart catheterization numbers.   Severe pulmonary hypertension  Patent CORNEJO to LAD graft however there appears to be coronary steal phenomenon due to severe left subclavian stenosis  We will consider left subclavian intervention  Will obtain follow-up vascular ultrasound to assess this  We will consult with CT surgery regarding therapeutic options        Surgery: 1/27/20 Urgent coronary artery bypass grafting surgery x1 with pedicled left internal mammary artery to the LAD. Cardiopulmonary bypass with on-pump beating-heart technique, no cross-clamp. Transesophageal echo. Epiaortic ultrasound. [place] catheter placement. Doppler verification of grafts. Bilateral five-level intercostal nerve block with Exparel. Platelet gel application. Sternal plating. Vas-Cath placement.         CARDIAC CATH 1/20/2020:   Left Heart Cath  Dominance : Right     LM: 70-80% short distal stenosis     LAD: 70-80% short ostial stenosis, extending into takeoff of high first diagonal; large proximal to mid stented segment patent with jailed second diagonal  (80% ostial D2) and 70% in stent restenosis of most distal stent; distal to near apical LAD with minimal disease     LCx: 70-80% short ostial stenosis, prior to patent proximal to mid stents      RCA: large, dominant vessel with long 60% proximal to mid stenosis      LVEDP: 4 mmHG  LVG not done due to CKD.     Impression/Recommendations:  Diabetic with previous LAD and LCx stenting in 2018 returns with unstable angina, in stent restenosis and Left Main bifurcation disease. Recommend CTS evaluation inpatient to discuss surgical revascularization option. Hold Clopidogrel.     Assessment: Subacute left navicular fracture     Plan:  1) Have recommended she wear her boot for comfort and support. Would recommend wound care follow for the ulcerations in the lower leg.      Assessment:  Itzel Malik is a 60 y.o. patient who [facility]21/21 with c/o shaking and multiple other complaints.   She has PMH: CAD s/p PCI to LAD and LCx 2018, s/p 1V CABG (LIMA to LAD,Jan 2020 for restenosis of LAD and LCCx and significant dz in RCA), ischemic CM HE=04%; chronic systolic CHF s/p CardioMEMs implant (2/2019), MR, PAD, CKD, DM, COPD, andleg wounds. Patient follows with NP Laith Becerra at our [facility] office. Last OV 2/2021. Most recent ECHO 8/18/20 EF 25%, moderate MR. Most recent lexiscan myoview stress test 8/22/20 showed large fixed defects in anteroseptal/apical walls, mild to mod reversibility in lateral walls consistent with ischemia. Note admit January 2021 with edema, increased weight and SOB.  She was diuresed with IV Lasix and PO metolazone. Diuretic regimen on d/c included torsemide 100mg daily, spironolactone 50 mg daily, and metolazone 5 mg Monday Wednesday and Fridays.  She is on low-dose beta-blocker though no ACE or ARB due to renal insufficiency and hypotension requiring midodrine. Now presents with multiple complaints including shaking, mid-back pain, weakness, fatigue, and leg cramps. She has not missed diuretics. Admit EKG 4/21/21 showed SR, PAC's, RBBB, LAFB, left axis septal infarct, nonspecific ST abnormality (PAC's new compared to 3/21 EKG). CXR with ? Lingular infiltrate. K+ 2.5, HXA=1029; Shady 0.07, 0.07. Diagnosis of hypokalemia and LLE cellulitis in middle-aged female with hx CAD s/p stents and 1V CABG, ischemic CM EF=25%, and hx chronic systolic CHF.      Recs:  1. Note I do not believe she has decompensated CHF. No volume overload on exam.   2. Concern for overdiuresis given hypokalemia. Holding diuretics for now  3. Will defer to renal doc what oral diuretic regimen we should try on d/c given recent issues. Note K+ and Na+ levels improving. 4. Nephrology consult  5. Continue baby asa, lipitor 80mg qd, coreg 3.125mg BID, midodrine 5mg BID, plavix 75mg qd.   6. No ACE-I due to renal issues and problems with hypotension in past.    OK for d/c from cardiac standpoint when IM and renal docs feel ready.     Patient Active Problem List   Diagnosis    Type 2 diabetes mellitus with diabetic polyneuropathy, with long-term current use of insulin ([address]    Essential hypertension    CLINT (obstructive sleep apnea)    Tobacco abuse disorder    GERD (gastroesophageal reflux disease)    Anxiety and depression    Hyponatremia    Iron deficiency anemia    CAD (coronary artery disease)    Mitral valve regurgitation    COPD (chronic obstructive pulmonary disease) ([address]    SHAUNNA (acute kidney injury) [facility]    Vitamin D deficiency    Dyslipidemia    Abel's esophagus    Acute on chronic congestive heart failure (HCC)    Viral hepatitis C    Pulmonary nodule    Class 2 severe obesity due to excess calories with serious comorbidity and body mass index (BMI) of 39.0 to 39.9 in adult ([address]    Bipolar disorder ([address]    Pulmonary hypertension (HCC)    Lower extremity edema    Habitual self-excoriation    Ulcer of left lower leg, limited to breakdown of skin (HCC)    Ulcer of right leg, limited to breakdown of skin ([address]    Arthritis    Cardiomyopathy ([facility]    Chronic systolic heart failure (HCC)    Chronic renal impairment    Peripheral venous insufficiency    PAD (peripheral artery disease) ([address]    Acute kidney injury superimposed on CKD ([address]    Morbid obesity with BMI of 40.0-44.9, adult ([address]    CKD (chronic kidney disease) stage 4, GFR 15-29 ml/min (HCC)    Hypokalemia    Dyspnea on exertion    Ischemic heart disease    Moderate protein-calorie malnutrition (HCC)    DKA, type 2, not at goal [facility])    Acute on chronic systolic CHF (congestive heart failure) (HCC)    Acute systolic CHF (congestive heart failure) (HCC)    CHF (congestive heart failure), NYHA class I, acute on chronic, combined (HCC)    Cellulitis    Closed nondisplaced fracture of navicular bone of left foot

## 2021-04-23 NOTE — PROGRESS NOTES
Spoke with Dr. Karime Dacosta, okay with patient being discharged from cardiology standpoint but would like for nephrology to decide on diuretics for patient with discharge and RN will update Dr Karime Dacosta with plan.

## 2021-04-23 NOTE — PROGRESS NOTES
Pt has diminished lung sounds throughout. Open spots and scabs scattered throughout her body, with frequent tendency to pick at her skin. A+O x4. Pt is lethargic, which is baseline. Medications given, see MAR. Head to toe completed, see Flowsheets. Call light in easy reach, bedside table in easy reach, bed alarm on, and bed in lowest position.    Dany Tobar RN

## 2021-04-23 NOTE — CONSULTS
[address] [address] Continence Nurse  Consult Note       NAME:  Jaymie Bautista  MEDICAL RECORD NUMBER:  [id number]  AGE: 60 y.o. GENDER: female  : 1965  TODAY'S DATE:  2021    Subjective  I saw Dr Jason Mejias in [address] [place] [address] last week 21 and she told me to put some cream on the toes. I don't know what it was. Reason for WOCN Evaluation and Assessment: multiple wounds and abrasions  to BLE, toes and arms      Haim Orta is a 60 y.o. female referred by:   [x] Physician  [] Nursing  [] Other:     Wound Identification:  Wound Type: Clusters of Venous ulcers right and left lower leg (a few full thickness). Some cellulitis left lower leg. Diabetic ulcers distal 1st and 2nd metatarsals and 3rd dorsal metatarsal on the left foot. Scattered scabs on face and arms from picking at the wounds. Area right wrist opne and bleeding and left lower leg. Contributing Factors:  edema, venous stasis, diabetes, decreased mobility, obesity, decreased tissue oxygenation, poor hygiene and acute kidney injury. Wound History:    The patient is a 59 y. o. female with DM, COPD, CAD, Chronic combined CHF, CKD who presented to [facility] ED with complaint of shaking.  She states she was shaking.  Patient is a poor historian.  She c/o shortness of breath.   She f/w Dr. Hetal Grant states her feet are swollen and her lower extremities are red.  She used to go to the [facility] and stopped going.  She's had a cough for 3 months.  She was given an inhaler and an antibiotic.  Denies fevers, chills, chest pain, abdominal pain, nausea, vomiting, or diarrhea.  She does not wear oxygen at home. Denilson Mayberry is on 2 L O2.      Known history of venous and diabetic ulcers. Reports she follow Dr Jason Mejias podiatrist in [address] [place] [address]. Current Wound Care Treatment: All wounds open to air.     Patient Goal of Care:  [x] Wound Healing  [] Odor Control  [] Palliative Care  [] Pain Control   [] Other:         PAST MEDICAL HISTORY        Diagnosis Date    Acute blood loss anemia 8/5/2020    Acute kidney injury ([address] 12/25/2019    Acute kidney injury superimposed on CKD ([address] 8/5/2020    Acute on chronic combined systolic and diastolic congestive heart failure ([address] 1/10/2020    Acute on chronic right-sided heart failure ([address] 08/2020    Alcohol dependence ([address] 3/10/2010    Arthritis     Asthma     CAD (coronary artery disease)     Cellulitis 6/3/2020    COPD (chronic obstructive pulmonary disease) (HCC)     Diabetic ulcer of left foot associated with type 2 diabetes mellitus, limited to breakdown of skin ([address] 1/18/2020    Diabetic ulcer of toe of right foot associated with type 2 diabetes mellitus ([address] 1/18/2020    Left renal artery stenosis ([address] 08/2020    MI (myocardial infarction) ([address] 10/07/2019    NSTEMI    Positive FIT (fecal immunochemical test) 2/28/2020    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 steal syndrome 09/01/2020    Traumatic perinephric hematoma, left, initial encounter 8/4/2020       PAST SURGICAL HISTORY    Past Surgical History:   Procedure Laterality Date    ARTERIAL BYPASS SURGRY Left 01/06/2016    Axillary/Subclavian to Brachial Bypass w/reversed SVG    CARDIAC CATHETERIZATION  03/27/2018    Dr. Maggie Mota - at [address] [facility]  01/20/2020    Dr. Maggie Luna      pancreatitis post surgery    CORONARY ANGIOPLASTY WITH STENT PLACEMENT  03/16/2018    MELODY- 3.5 x 38 and 3.5 x 20 to Cx    CORONARY ANGIOPLASTY WITH STENT PLACEMENT  01/03/2018    MELODY- 3.0 x 38 and 2.75 x 26 and 2.75 x 8 and 2.75 x 22 to the LAD    CORONARY ANGIOPLASTY WITH STENT PLACEMENT  10/07/2019    MELODY- 2.5 x 8 to mLAD    CORONARY ARTERY BYPASS GRAFT N/A 1/27/2020    CORONARY ARTERY BYPASS GRAFTING X 1, ON PUMP BEATING HEART, INTERNAL MAMMARY ARTERY TO LEFT ANTERIOR DESCENDING ARTERY, VAS CATH INSERTION, URI, PLATELET GEL APPLICATION, 5 LEVEL BILATERAL INTERCOSTAL NERVE BLOCK, STERNAL PLATING performed by Barber Oliva MD at [address] Right 5/16/2019    fem-pop, performed by Guillermina Conteh MD at [address]  02/14/2019    CardioMEMs insertion for CHF    ROTATOR CUFF REPAIR Left 04/22/2016    TONSILLECTOMY      TRANSESOPHAGEAL ECHOCARDIOGRAM  01/26/2020    TRANSLUMINAL ANGIOPLASTY Left 10/08/2019    with stenting, at [facility]    TRANSLUMINAL ANGIOPLASTY Left 04/29/2020    of the [facility] re-occlusion    TUMOR EXCISION      benign tumors X 2 chest & axilla    UPPER GASTROINTESTINAL ENDOSCOPY  4/25/2013    BX barretts, HH, gastritis    UPPER GASTROINTESTINAL ENDOSCOPY  12/10/2015    UPPER GASTROINTESTINAL ENDOSCOPY  01/06/2017    Gastritis    VASCULAR SURGERY Left 12/08/2015    upper extremity and mesenteric angiogram (diagnostic only)    VASCULAR SURGERY Bilateral 07/07/2020    diagnostic lower extremity angiogram only    VASCULAR SURGERY Left 08/04/2020    angioplasty and stent of renal artery    VASCULAR SURGERY Left 08/05/2020    coil embolization of branch renal artery vessel for bleeding    VASCULAR SURGERY Left 09/01/2020    angioplasty and stenting of subclavian artery       FAMILY HISTORY    Family History   Problem Relation Age of Onset    Heart Disease Maternal Grandmother     Cancer Mother         lung and brain cancer    Cancer Maternal Aunt         lung and brain cancer       SOCIAL HISTORY    Social History     Tobacco Use    Smoking status: Current Every Day Smoker     Packs/day: 1.00     Years: 30.00     Pack years: 30.00     Types: Cigarettes    Smokeless tobacco: Never Used    Tobacco comment: 0.5ppd as of 2/22/21   Substance Use Topics    Alcohol use: No     Comment: quit 2006     Drug use: Never       ALLERGIES    Allergies   Allergen Reactions    Lisinopril Other (See Comments)     Severe hypotension       MEDICATIONS    No current facility-administered medications on file prior to encounter.       Current Outpatient Medications on File Prior to Encounter   Medication Sig Dispense Refill    QUEtiapine (SEROQUEL XR) 50 MG extended release tablet Take 1 mg by mouth nightly Not sure of dose      budesonide-formoterol (SYMBICORT) 160-4.5 MCG/ACT AERO Inhale 2 puffs into the lungs 2 times daily 1 Inhaler 5    torsemide (DEMADEX) 100 MG tablet Take 1 tablet by mouth daily 90 tablet 1    nitroGLYCERIN (NITROSTAT) 0.4 MG SL tablet Place 1 tablet under the tongue every 5 minutes as needed for Chest pain up to max of 3 total doses. If no relief after 1 dose, call 911. 25 tablet 0    Insulin Degludec (TRESIBA FLEXTOUCH) 100 UNIT/ML SOPN Inject 30 Units into the skin nightly 10 pen 5    insulin lispro (HUMALOG KWIKPEN U-200) 200 UNIT/ML SOPN pen Inject 10 Units into the skin 3 times daily (before meals) 2 pen 5    metOLazone (ZAROXOLYN) 5 MG tablet Take 1 tablet by mouth three times a week On Mon Weds and Friday 1 tablet 0    carvedilol (COREG) 3.125 MG tablet TAKE 1 TABLET BY MOUTH TWICE A DAY WITH MEALS 180 tablet 3    atorvastatin (LIPITOR) 80 MG tablet TAKE 1 TABLET BY MOUTH EVERY DAY AT NIGHT 90 tablet 3    doxepin (SINEQUAN) 25 MG capsule TAKE 1 CAPSULE BY MOUTH EVERY DAY AT NIGHT      KLOR-CON M20 20 MEQ extended release tablet TAKE 2 TABLETS BY MOUTH 4 TIMES DAILY 720 tablet 1    methocarbamol (ROBAXIN) 500 MG tablet Take 500 mg by mouth 2 times daily      midodrine (PROAMATINE) 5 MG tablet Take 5 mg by mouth 2 times daily      spironolactone (ALDACTONE) 50 MG tablet Take 50 mg by mouth daily      tiotropium (SPIRIVA RESPIMAT) 2.5 MCG/ACT AERS inhaler INHALE 2 PUFFS INTO THE LUNGS DAILY 1 Inhaler 6    buprenorphine-naloxone (SUBOXONE) 8-2 MG FILM SL film Place 1 Film under the tongue 2 times daily.       benztropine (COGENTIN) 1 MG tablet Take 1 mg by mouth nightly       pantoprazole (PROTONIX) 40 MG tablet Take 1 tablet by mouth 2 times daily 60 tablet 0    clopidogrel (PLAVIX) 75 MG tablet TAKE 1 TABLET BY MOUTH EVERY DAY 30 tablet 5    magnesium oxide (MAG-OX) 400 (240 Mg) MG tablet TAKE 1 TABLET ONCE DAILY 30 tablet 11    busPIRone (BUSPAR) 30 MG tablet Take 30 mg by mouth 2 times daily       aspirin EC 81 MG EC tablet Take 1 tablet by mouth daily 30 tablet 3    ARIPiprazole (ABILIFY) 15 MG tablet Take 15 mg by mouth daily       lamoTRIgine (LAMICTAL) 200 MG tablet Take 200 mg by mouth 2 times daily       Insulin Pen Needle (B-D ULTRAFINE III SHORT PEN) 31G X 8 MM MISC Five shots a day 200 each 5    Blood Glucose Monitoring Suppl (FREESTYLE FREEDOM LITE) w/Device KIT Use to test glucose 4 times a day 1 kit 0    blood glucose test strips (FREESTYLE LITE) strip USE TO CHECK BLOOD GLUCOSE LEVELS FOUR TIMES DAILY 200 strip 10    INSULIN SYRINGE .5CC/29G 29G X 1/2\" 0.5 ML MISC 1 each by Does not apply route daily 100 each 3       Objective  Was sitting up in chair. Getting back to bed as RN entered the room. /70   Pulse 85   Temp 97.2 °F (36.2 °C) (Oral)   Resp 16   Ht 5' 4\" (1.626 m)   Wt 206 lb (93.4 kg)   LMP 10/24/2012   SpO2 95%   BMI 35.36 kg/m²     LABS:  WBC:    Lab Results   Component Value Date    WBC 15.1 04/23/2021     H/H:    Lab Results   Component Value Date    HGB 14.5 04/23/2021    HCT 42.9 04/23/2021     PTT:    Lab Results   Component Value Date    APTT 26.7 12/22/2020   [APTT}  PT/INR:    Lab Results   Component Value Date    PROTIME 12.1 12/22/2020    INR 1.04 12/22/2020     HgBA1c:    Lab Results   Component Value Date    LABA1C 14.8 03/15/2021       Assessment  Scattered dry scabs on face and arms. Patient stated they itch a lot and she scratches the areas until bleed. On area open right wrist that is bleeding. Cluster of open venous ulcers on left lower anterior and inner leg. These wounds are mostly red with one depth 0.6 cm that has some yellow slough in the wound base.   Left great and 2nd toe distal tip with soft brown eschar and 3rd metatarsal dorsal toe with moist yellow slough. Left lower leg with edema and some redness. Right dorsal foot with a dry ulcer.   See photo's   Vivek Risk Score: Vivek Scale Score: 20    Patient Active Problem List   Diagnosis    Type 2 diabetes mellitus with diabetic polyneuropathy, with long-term current use of insulin (HCC)    Essential hypertension    CLINT (obstructive sleep apnea)    Tobacco abuse disorder    GERD (gastroesophageal reflux disease)    Anxiety and depression    Hyponatremia    Iron deficiency anemia    CAD (coronary artery disease)    Mitral valve regurgitation    COPD [facility]chronic obstructive [facility] disease) [facility]    SHAUNNA (acute kidney injury) ([facility]    Vitamin D deficiency    Dyslipidemia    Abel's esophagus    Acute on chronic congestive heart failure (HCC)    Viral hepatitis C    Pulmonary nodule    Class 2 severe obesity due to excess calories with serious comorbidity and body mass index (BMI) of 39.0 to 39.9 in adult ([facility])    Bipolar disorder (HCC)    Pulmonary hypertension (HCC)    Lower extremity edema    Habitual self-excoriation    Ulcer of left lower leg, limited to breakdown of skin (HCC)    Ulcer of right leg, limited to breakdown of skin ([facility])    Arthritis    Cardiomyopathy ([facility]    Chronic systolic CHF (congestive heart failure) ([facility])    Chronic renal impairment    Peripheral venous insufficiency    PAD (peripheral artery disease) (HCC)    Acute kidney injury superimposed on CKD ([facility]    Morbid obesity with BMI of 40.0-44.9, adult ([facility])    CKD (chronic kidney disease) stage 4, GFR 15-29 ml/min ([facility])    Hypokalemia    Dyspnea on exertion    Ischemic heart disease    Moderate protein-calorie malnutrition (HCC)    DKA, type 2, not at goal [facility])    Acute on chronic systolic CHF (congestive heart failure) (HCC)    Acute systolic CHF (congestive heart failure) (HCC)    CHF (congestive heart failure), NYHA class I, acute on chronic, combined ([facility])    Cellulitis  Closed nondisplaced fracture of navicular bone of left foot       Measurements:      Wound 04/22/21 Leg Left wounds in clusters. (Active) known since 1/11/21   Wound Image   04/23/21 1622   Wound Etiology Venous 04/23/21 1622   Dressing Status New dressing applied 04/23/21 1622   Wound Cleansed Cleansed with saline 04/23/21 1622   Dressing/Treatment Alginate with Ag;Dry dressing;Roll gauze 04/23/21 1622   Offloading for Diabetic Foot Ulcers Other (comment) 04/23/21 1622   Dressing Change Due 04/24/21 04/23/21 1622   Wound Length (cm) 16.5 cm 04/23/21 1622   Wound Width (cm) 13 cm 04/23/21 1622   Wound Depth (cm) 0.6 cm 04/23/21 1622   Wound Surface Area (cm^2) 214.5 cm^2 04/23/21 1622   Wound Volume (cm^3) 128.7 cm^3 04/23/21 1622   Distance Tunneling (cm) 0 cm 04/23/21 1622   Tunneling Position ___ O'Clock 0 04/23/21 1622   Undermining Starts ___ O'Clock 0 04/23/21 1622   Undermining Ends___ O'Clock 0 04/23/21 1622   Undermining Maxium Distance (cm) 0 04/23/21 1622   Wound Assessment Pink/red;Slough 04/23/21 1622   Drainage Amount Small 04/23/21 1622   Drainage Description Serosanguinous 04/23/21 1622   Odor None 04/23/21 1622   Shaye-wound Assessment Blanchable erythema;Edematous 04/23/21 1622   Margins Attached edges; Defined edges 04/23/21 1622   Wound Thickness Description not for Pressure Injury Full thickness 04/23/21 1622   Number of days: 1     Left lower leg:            Wound 04/22/21 Toe (Comment  which one) Left cluster tip of 1 + 2, dorsal on toe 3 brown and yellow slough.  (Active) known since 1/22/21   Wound Image    04/23/21 1622   Wound Etiology Diabetic 04/23/21 1622   Dressing Status New dressing applied 04/23/21 1622   Wound Cleansed Cleansed with saline;Betadine/povidone iodine 04/23/21 1622   Dressing/Treatment Dry dressing;Roll gauze 04/23/21 1622   Offloading for Diabetic Foot Ulcers Other (comment) 04/23/21 1622   Dressing Change Due 04/24/21 04/23/21 1622   Wound Length (cm) 6 cm 04/23/21 1622   Wound Width (cm) 4.5 cm 04/23/21 1622   Wound Depth (cm) 0.1 cm 04/23/21 1622   Wound Surface Area (cm^2) 27 cm^2 04/23/21 1622   Wound Volume (cm^3) 2.7 cm^3 04/23/21 1622   Distance Tunneling (cm) 0 cm 04/23/21 1622   Tunneling Position ___ O'Clock 0 04/23/21 1622   Undermining Starts ___ O'Clock 0 04/23/21 1622   Undermining Ends___ O'Clock 0 04/23/21 1622   Undermining Maxium Distance (cm) 0 04/23/21 1622   Wound Assessment Eschar dry;Slough 04/23/21 1622   Drainage Amount Small 04/23/21 1622   Drainage Description Serosanguinous 04/23/21 1622   Odor None 04/23/21 1622   Shaye-wound Assessment Dry/flaky; Intact 04/23/21 1622   Margins Attached edges; Defined edges 04/23/21 1622   Wound Thickness Description not for Pressure Injury Partial thickness 04/23/21 1622   Number of days: 1      Left foot 1-3 metatarsals:               Wound 04/22/21 Leg Right (Active) known since 1/11/21   Wound Image   04/23/21 1622   Wound Etiology Venous 04/23/21 1622   Dressing Status New dressing applied 04/23/21 1622   Wound Cleansed Cleansed with saline 04/23/21 1622   Dressing/Treatment Foam 04/23/21 1622   Offloading for Diabetic Foot Ulcers Other (comment) 04/23/21 1622   Dressing Change Due 04/26/21 04/23/21 1622   Wound Length (cm) 0.5 cm 04/23/21 1622   Wound Width (cm) 0.5 cm 04/23/21 1622   Wound Depth (cm) 0.1 cm 04/23/21 1622   Wound Surface Area (cm^2) 0.25 cm^2 04/23/21 1622   Wound Volume (cm^3) 0.02 cm^3 04/23/21 1622   Distance Tunneling (cm) 0 cm 04/23/21 1622   Tunneling Position ___ O'Clock 0 04/23/21 1622   Undermining Starts ___ O'Clock 0 04/23/21 1622   Undermining Ends___ O'Clock 0 04/23/21 1622   Undermining Maxium Distance (cm) 0 04/23/21 1622   Wound Assessment Pink/red 04/23/21 1622   Drainage Amount Small 04/23/21 1622   Drainage Description Serosanguinous 04/23/21 1622   Odor None 04/23/21 1622   Shaye-wound Assessment Intact;Dry/flaky 04/23/21 1622   Margins Attached edges; Defined edges 04/23/21 1622   Wound Thickness Description not for Pressure Injury Partial thickness 04/23/21 1622   Number of days: 1      Right lower leg:      Left side of face:          Right lower arm and wrist:        Left arm:          Response to treatment:  Well tolerated by patient. Pain Assessment:  Severity:  0 / 10  Quality of pain: N/A  Wound Pain Timing/Severity: none  Premedicated: No    Plan   Plan of Care: Wound 04/22/21 Leg Left wounds in clusters. -Dressing/Treatment: Alginate with Ag, Dry dressing, Roll gauze  Wound 04/22/21 Toe (Comment  which one) Left cluster tip of 1 + 2, dorsal on toe 3 brown and yellow slough. -Dressing/Treatment: Dry dressing, Roll gauze  Wound 04/22/21 Leg Right-Dressing/Treatment: Foam     Recommend:  Clean all wounds with normal saline. Apply alginate AG / opticelAg to left lower leg open wounds, cover with dry 4xx4 and roll guaze daily. Left 1-4 toes, paint with betadine, cover with dry dressing and roll guaze daily. Apply foam dressings to small open areas (rfrom picking on right lower leg and right wrist, change every 3 days and prn. Wound care to follow. Call wound care for deterioration [phone]. Specialty Bed Required : Yes   [] Low Air Loss   [x] Pressure Redistribution isoflex mattress in place  [] Fluid Immersion  [] Bariatric  [] Total Pressure Relief  [] Other:     Current Diet: Dietary Nutrition Supplements: Diabetic Oral Supplement  DIET LOW SODIUM 2 GM; Dietician consult:  Yes    Discharge Plan:  Placement for patient upon discharge: home with support    Patient appropriate for Outpatient [address]: Yes continue to follow podiatry Dr Aftab Maciel. In [facility]. Referrals:  []  / discharge planner following. Patient states she has no needs at home.  [] [employer]  [] Supplies  [] Other    Patient/Caregiver Teaching: instructed on treatment.   Level of patient/caregiver understanding able to:   [] Indicates understanding       [x] Needs reinforcement  [] Unsuccessful      [] Verbal Understanding  [] Demonstrated understanding       [] No evidence of learning  [] Refused teaching         [] N/A       Electronically signed by Dimitris Aguilar, RN, MSN, Sabine Kwon on 4/23/2021 at 5:02 PM

## 2021-04-23 NOTE — PROGRESS NOTES
Dr. Rashmi Carlson in to see patient. VO to DC IVF and order glucerna with meals   Plan for a dose of samsca today and may DC fluid restrict when patient is given samsca.

## 2021-04-23 NOTE — PROGRESS NOTES
present on admission, resolved    related to over diuresis    -Diabetes with hyperglycemia defer to primary team  -Chronic CHF combined, compensated  -Left ankle swelling/fracture Ortho consulted  -Chronic kidney disease stage IIIb with baseline creatinine of around 1.6    Plan  -will resume torsemide 50 mg daily when her wt increases over 210 lbs  Hold metolazone and spironolactone  -Increase protein intake  -DC IVF  -tolvaptan 7.5 mg po times one  -hold discharge today    Thank you the consultation. Please do not hesitate to call with questions.     Jaye Garnett  [facility]  Office: [phone]  Fax:    [phone]

## 2021-04-23 NOTE — PROGRESS NOTES
Progress Note    Admit Date:  4/21/2021    60 y.o. female with DM, COPD, CAD, Chronic combined CHF, CKD who presented to [facility] ED with complaint of shaking, shortness of breath, erythema/swelling BLE's. Subjective:  Ms. Joie Chino feels improved today. Dyspnea improved. Sodium improved to 126. Objective:   Patient Vitals for the past 4 hrs:   BP Temp Temp src Pulse Resp SpO2 Weight   04/23/21 0722 103/68 97.3 °F (36.3 °C) Axillary 94 16 94 % --   04/23/21 0706 -- -- -- -- 16 95 % --   04/23/21 0545 -- -- -- -- -- -- 206 lb (93.4 kg)            Intake/Output Summary (Last 24 hours) at 4/23/2021 0824  Last data filed at 4/23/2021 0551  Gross per 24 hour   Intake 932 ml   Output 2650 ml   Net -1718 ml       Physical Exam:  Gen: No distress. Alert. Chronically ill appearing. Eyes: PERRL. No sclera icterus. No conjunctival injection. ENT: No discharge. Pharynx clear. Neck:  Trachea midline. Resp: No accessory muscle use. No crackles. No wheezes. No rhonchi. CV: Regular rate. Regular rhythm. No murmur. No rub. 1+ BLE edema. Capillary Refill:  >3 seconds   Peripheral Pulses: +1 palpable, equal bilaterally   GI: Non-tender. Non-distended. Normal bowel sounds. No hernia. Skin: Warm and dry. Chronic venous changes BLE's. Open wounds  M/S: No cyanosis. No joint deformity. No clubbing. Neuro: Awake. Nods off easily. Grossly nonfocal    Psych: Oriented x 3. No anxiety or agitation.      Data:  CBC:   Recent Labs     04/21/21  2056 04/22/21  0811 04/23/21  0600   WBC 18.7* 15.3* 15.1*   HGB 17.1* 15.0 14.5   HCT 49.3* 44.0 42.9   MCV 85.9 87.9 89.0    342 319     BMP:   Recent Labs     04/22/21  1237 04/22/21  2157 04/23/21  0600   * 124* 126*   K 3.2* 3.9 3.6   CL 72* 77* 80*   CO2 39* 37* 36*   PHOS 3.9  --   --    BUN 59* 57* 59*   CREATININE 1.6* 1.6* 1.6*     LIVER PROFILE:   Recent Labs     04/21/21 2056   AST 17   ALT 13   BILITOT 0.6   ALKPHOS 224*     PT/INR: No results for input(s): PROTIME, INR in the last 72 hours. CULTURES  COVID: not detected  Blood 2: Streptococcus   Blood 1: pending    RADIOLOGY  XR FOOT LEFT (MIN 3 VIEWS)   Final Result   Probable subacute or chronic fracture of the navicular bone with associated   mild talonavicular subluxation. Recommend orthopedic consultation and   suggest a CT scan of the foot to further characterize the area. Bony spurring along the calcaneus. XR ANKLE LEFT (MIN 3 VIEWS)   Final Result   Questionable subacute or chronic fracture of the navicular bone. Recommend a   follow-up left foot series. Mild soft tissue swelling around the ankle and diffuse osteopenia with no   obvious ankle fracture seen. XR CHEST PORTABLE   Preliminary Result   Silhouetting of the left heart border suggestive of a lingular infiltrate. Followup to resolution is suggested. Pertinent previous results reviewed   Stress 8/22/2020   Summary    Calculated LVEF is not reliable on study which is technically difficult,    estimate of 51% is likely an overestimate    Mid-distal anteroseptal/apical akinesis    Large fixed defects in anteroseptal/apical walls    Mild to moderate reversibility in lateral walls consistent with ischemia        Overall, this would be considered an abnormal high risk study         Limited Echo 8/19/2020   Summary   Limited only f/u for LVEF and mitral regurgitation.   Technically difficult examination.  [facility] is severely reduced with an ejection   fraction of 25%.   There is hypokinesis of the apex, apical lateral, apical septum,   anterioseptum and anterior walls.   Compared to previous study from 7-1-2020 no changes noted in left   ventricular function.   Moderate mitral regurgitation.         Jordon Alvarez have reviewed the chart on Corina Da Silva and personally interviewed and examined patient, reviewed the data (labs and imaging) and after discussion with my PA formulated the plan. Agree with note with the following edits. HPI:     Patient is feeling better today. She is hoping to go home today. Sodium is improved to 126. One of her blood cultures came back positive and initial ID is likely strep viridans most likely a skin contaminant. She is afebrile. She is usually not on oxygen at home. Presently on 2 L. I reviewed the patient's Past Medical History, Past Surgical History, Medications, and Allergies. Physical exam:    /68   Pulse 94   Temp 97.3 °F (36.3 °C) (Axillary)   Resp 16   Ht 5' 4\" (1.626 m)   Wt 206 lb (93.4 kg)   LMP 10/24/2012   SpO2 94%   BMI 35.36 kg/m²        Gen: No distress. Alert. Chronically sick appearing. Looks much older than stated age  Eyes: PERRL. No sclera icterus. No conjunctival injection. ENT: No discharge. Pharynx clear. Neck: Trachea midline. Normal thyroid. Resp: No accessory muscle use. No crackles. no wheezes. No rhonchi. No dullness on percussion. CV: Regular rate. Regular rhythm. No murmur or rub. ++ edema. GI: Non-tender. Non-distended. No masses. No organomegaly. Normal bowel sounds. No hernia. Skin: Mild redness of both legs but no increase in warmth. No tenderness. Several open areas are present in both lower extremity. Pedal pulses are diminished. Lymph: No cervical LAD. No supraclavicular LAD. M/S: No cyanosis. No joint deformity. No clubbing. Neuro: Awake. Reflexes 2+ symmetric bilaterally. Moves all 4 extremities, non focal  Psych: Oriented x 3. No anxiety or agitation. Assessment/Plan:  Hyponatremia  - admitted to PCU on tele  - we consulted cardiology and nephrology   - monitor labs.  Improved from 120 to 126  - fluid restriction  -Diuretics held     Acute hypoxic respiratory failure  - she was requiring 2 L O2.    - weaned to RA.     Acute on chronic combined CHF  CAD s/p CABG   Ischemic cardiomyopathy  Severe pulmonary hypertension  Elevated troponin 0.07  - Cardiology consulted. - daily weight's; I&O's. Low sodium diet  - Zaroxolyn Mon, Wed, Fri on hold at present  - continue aspirin, Plavix, Atorvastatin, Coreg.  -Her weight is down. Nephrology wants to hold off Lasix. She got Demadex yesterday morning. IVF's added. Nephrology will decide on diuretic regimen at discharge     COPD  - continue Spiriva, Symbicort.     CKD stage 3  - Baseline 1.3-1.6  - 1.7 on admission  Nephrology consulted  - stable at 1.6     Mild Celllulitis BLE  - most likely chronic changes of BLE's. - Doxy D#1. Stopped Vanc, Cefepime.   - changed to Ancef D#2. I suspect the blood culture to be a contaminant. Initial ID is likely strep viridans. Is only positive in one bottle. Repeat blood culture ordered.     Hypokalemia  - replaced.     Lactic acidosis  Leukocytosis  - monitor lactic, CBC  - improving     PAD/PVD  - F/w Dr. Jenna Sorenson     Chronic BLE wounds  - F/w [facility]     DM type 2  - monitor glucose  - continue Lantus 30 units nightly. SSI Coverage.      Subacute/chronic fracture of navicular bone  - X-ray recommends ortho consult and CT of foot. -Ortho has seen patient. Outpatient podiatry consult recommended.     Obesity  - Body mass index is 35.62 kg/m².   - Recommended weight loss     DVT Prophylaxis: Lovenox 30 mg daily    Diet: DIET LOW SODIUM 2 GM; 1500 ml  Code Status: Full Code    Irma EDWARDS  4/23/2021     ROSARIO PARK 4/23/2021 11:22 AM   ROSARIO PARK 4/23/2021 11:22 AM

## 2021-04-24 NOTE — PROGRESS NOTES
Spoke with Dr. Lisa Evans regarding new order for ECHO and the fact that pt. Wanting to go home and ECHO will not occur until Monday. States she does not feel like pt. Is ready to go home at this time. Updated pt. And  Reasoning for staying for ECHO to be completed. Pt. Upset, but verbalizes understanding.  Mónica Chavez RN

## 2021-04-24 NOTE — PROGRESS NOTES
Pt. Resting in bed No signs of distress noted. Pt. Up by self to recliner. Pt. Refusing bed alarm. States she will call if she needs help. Pt. Ambulated well when this was in room. Pt. Assessment completed- see flow sheet. Pt. denies further needs at this time. Will continue to monitor. Call light is within reach. Lucio Villalobos RN            Pt has been educated to hospital falls prevention policy. They are aware they will be assessed every shift, and with any condition changes, by the nursing staff on their ability to perform their ADL's without need for assistance. Pt understands that based on the number of their score they are given a base score that will assign a level of low, medium, or high risk for falls. This patient has rated a high which requires a bed / chair alarm for their safety to prevent a fall. The patient is alert and oriented, and acknowledges and understands the need for intervention but refuses the application and use of the bed / chair alarm that is required per policy. Pt agrees to use call light and wait for help to arrive to assist them to get up when they need to. Call light is within reach and all other safety measures in place.

## 2021-04-24 NOTE — PROGRESS NOTES
[facility]    Follow up Note           Reason for Consult: Hyponatremia, hypokalemia  Requesting Physician:  Dr. Carley Yu history  Feels better  Waiting on labs this morning  Received Samsca 7.5 mg p.o. on 4/23    ROS: No chest pain/shortness of breath/fever/nausea/vomiting  PSFH: Visitor present    Scheduled Meds:   insulin lispro  6 Units Subcutaneous TID WC    povidone-iodine   Topical Daily    ARIPiprazole  15 mg Oral Daily    aspirin EC  81 mg Oral Daily    atorvastatin  80 mg Oral Nightly    benztropine  1 mg Oral Nightly    budesonide-formoterol  2 puff Inhalation BID    busPIRone  30 mg Oral BID    carvedilol  3.125 mg Oral BID    buprenorphine-naloxone  1 Film Sublingual BID    clopidogrel  75 mg Oral Daily    doxepin  25 mg Oral Nightly    lamoTRIgine  200 mg Oral BID    midodrine  5 mg Oral BID    pantoprazole  40 mg Oral BID    potassium chloride  20 mEq Oral Daily    tiotropium  2 puff Inhalation Daily    sodium chloride flush  5-40 mL Intravenous 2 times per day    insulin lispro  0-12 Units Subcutaneous TID WC    insulin lispro  0-6 Units Subcutaneous Nightly    insulin glargine  30 Units Subcutaneous Nightly    enoxaparin  30 mg Subcutaneous Daily    ceFAZolin  2,000 mg Intravenous Q8H     Continuous Infusions:   dextrose      sodium chloride       PRN Meds:.traZODone, glucose, dextrose, glucagon (rDNA), dextrose, sodium chloride flush, sodium chloride, polyethylene glycol, acetaminophen **OR** acetaminophen, prochlorperazine    History of Present Illness on 4/22/2021:    60 y.o. yo female with PMH of COPD, CHF combined, chronic kidney disease stage III with baseline creatinine of 1.3-1.6 along with history of left renal artery stenosis  who is admitted for altered mental status, and nephrology has been consulted for electrolyte disorder  Patient reports that she was 216 pounds last week but now is 207 pounds  She is on multiple medications doxepin and Abilify  Patient sodium was 122 on 3/15 and 16. Her admission sodium was 128 and dropped to 120 at 8 AM on 4/22  Potassium was 2.5 on admission and is up to 3.4    Physical exam:   Constitutional:  VITALS:  /86   Pulse 96   Temp 97 °F (36.1 °C) (Oral)   Resp 16   Ht 5' 4\" (1.626 m)   Wt 207 lb 8 oz (94.1 kg)   LMP 10/24/2012   SpO2 94%   BMI 35.62 kg/m²   Gen: alert, awake, nad  Skin: no rash, turgor wnl  Heent:  eomi, mmm  Neck: no bruits or jvd noted, thyroid normal  Cardiovascular:  S1, S2 without m/r/g; trace lower extremity edema  Respiratory: CTA B without w/r/r; respiratory effort normal  Abdomen:  +bs, soft, nt, nd, no hepatosplenomegaly  Neuro/Psy: AAoriented times 3 ; moves all 4 ext  Musculoskeletal:  Rom, muscular strength intact; digits, nails normal    Data/  Recent Labs     04/21/21  2056 04/22/21  0811 04/23/21  0600   WBC 18.7* 15.3* 15.1*   HGB 17.1* 15.0 14.5   HCT 49.3* 44.0 42.9   MCV 85.9 87.9 89.0    342 319     Recent Labs     04/22/21  0811 04/22/21  1237 04/22/21  1237 04/23/21  0600 04/23/21  1321 04/24/21  0145   * 121*   < > 126* 121* 123*   K 3.4* 3.2*   < > 3.6 3.7 3.0*   CL 73* 72*   < > 80* 78* 79*   CO2 36* 39*   < > 36* 34* 35*   GLUCOSE 272* 370*   < > 355* 319* 249*   PHOS  --  3.9  --   --   --   --    MG 1.90 2.00  --   --   --  2.10   BUN 58* 59*   < > 59* 54* 52*   CREATININE 1.7* 1.6*   < > 1.6* 1.5* 1.4*   LABGLOM 31* 33*   < > 33* 36* 39*   GFRAA 37* 40*   < > 40* 43* 47*    < > = values in this interval not displayed. Assessment  -Hyponatremia, patient seems to be volume depleted presentation which has improved with ivf.drop on na on 4/23 is likely from hyperglycemia and decreased solute intake. She is also on multiple medications which can cause increased ADH like doxepin.   With her weight loss continued use of metolazone, this is likely reason for her volume depletion   Sodium of 120, when corrected for blood sugar is close to about 124 at 8 AM on 4/22    -Hypokalemia    -Metabolic alkalosis from over diuresis    -Hypercalcemia present on admission, resolved    related to over diuresis    -Diabetes with hyperglycemia defer to primary team  -Chronic CHF combined, compensated  -Left ankle swelling/fracture Ortho consulted  -Chronic kidney disease stage IIIb with baseline creatinine of around 1.6    Plan  -torsemide 50 mg daily when her wt increases over 210 lbs  Hold metolazone and spironolactone  -Increase protein intake  -Potassium chloride 80 M EQ on 4/24    Follow labs this morning and patient can be discharged home if electrolytes are better    Thank you the consultation. Please do not hesitate to call with questions.     Jaye Garnett  [facility]  Office: [phone]  Fax:    [phone]

## 2021-04-24 NOTE — PROGRESS NOTES
Pt in bed, awake, A/O X4. Shift assessment complete, night meds given. No C/o pain at this time. Ambulated to bathroom, tolerated well. . No other needs expressed. Call light in reach. Bed check in place. Will monitor.  Mago Wright

## 2021-04-24 NOTE — PROGRESS NOTES
type 2  - monitor glucose  - continue Lantus 30 units nightly. SSI Coverage.      Subacute/chronic fracture of navicular bone  - X-ray recommends ortho consult and CT of foot. -Ortho has seen patient.   Outpatient podiatry consult recommended.     Obesity       DVT Prophylaxis: Lovenox 30 mg daily    Diet: Dietary Nutrition Supplements: Diabetic Oral Supplement  DIET LOW SODIUM 2 GM;  Code Status: Full Code    Constantine Suero 4/24/2021 1:22 PM   Constantine Suero 4/24/2021 1:22 PM

## 2021-04-24 NOTE — PROGRESS NOTES
Pt in bed, eyes closed. No distress noted. Call light in reach. Will continue to monitor.   Elisabeth Mckenna

## 2021-04-25 NOTE — PROGRESS NOTES
Progress Note    Admit Date:  4/21/2021    60 y.o. female with DM, COPD, CAD, Chronic combined CHF, CKD  Admitted with multiple complaints 4 days ago. Cough shortness of breath    bilateral lower extremity swelling and shoulder pain  Patient picks on her skin he has diffuse dermatitis    Subjective:  Ms. Domingo Young feels somewhat improved  . Wanting to go home   however her sodium remains low  Patient does drink a lot of water her mouth is dry all the time  No improvement in sodium patient has received Samsca twice  Lower extremity edema improving    Objective:   Vitals:    04/24/21 2006 04/25/21 0049 04/25/21 0702 04/25/21 0726   BP: (!) 120/91 121/84  (!) 127/92   Pulse: 74 100  92   Resp: 16 18  18   Temp: 97.1 °F (36.2 °C) 97.6 °F (36.4 °C)  96.4 °F (35.8 °C)   TempSrc: Oral Axillary  Oral   SpO2: 97% 97% 96% 100%   Weight:  211 lb 6.4 oz (95.9 kg)     Height:            Intake/Output Summary (Last 24 hours) at 4/25/2021 1422  Last data filed at 4/25/2021 1003  Gross per 24 hour   Intake 540 ml   Output 1050 ml   Net -510 ml       Physical Exam:  Gen: No distress. Alert. Chronically ill appearing. Morbidly obese  Neck:  Trachea midline. Resp: No accessory muscle use. No crackles. No wheezes. No rhonchi. CV: Regular rate. Regular rhythm. No murmur. No rub. 1+ BLE edema. Peripheral Pulses: +1 palpable, equal bilaterally   GI: Non-tender. Non-distended. Normal bowel sounds. No hernia. Skin:  Upper arm dermatitis from skin picking chronic venous changes BLE's. Open wounds  M/S: No cyanosis. No joint deformity. No clubbing. Neuro: Awake. Nods off easily. Grossly nonfocal    Psych: Oriented x 3. No anxiety or agitation.      Data:  CBC:   Recent Labs     04/23/21  0600 04/25/21  0418   WBC 15.1* 15.0*   HGB 14.5 13.3   HCT 42.9 39.6   MCV 89.0 89.7    261     BMP:   Recent Labs     04/24/21  1114 04/24/21  1641 04/25/21  0418   * 125* 124*   K 3.8 4.4 4.3   CL 80* 83* 83*   CO2 32 32 28   BUN 52* 52* 37*   CREATININE 1.2* 1.3* 1.2*       CULTURES  COVID: not detected  Blood 2: Streptococcus   Blood 1: pending    RADIOLOGY       Limited Echo 8/19/2020   Summary   Limited only f/u for LVEF and mitral regurgitation.   Technically difficult examination.  Mohit Betancourt left ventricular systolic function is severely reduced with an ejection   fraction of 25%.   There is hypokinesis of the apex, apical lateral, apical septum,   anterioseptum and anterior walls.   Compared to previous study from 7-1-2020 no changes noted in left   ventricular function.   Moderate mitral regurgitation. Assessment/Plan:    Streptlococcus viridrns bacteremia1/2  Antibiotic changed to  intravenous Unasyn  Echocardiogram p  Repeat Blood culture is negative so far  Isolated  only 1 bottle however will treated  patient cellulitis and skin picking    Hyponatremia  -nephrolgy consulted patient has been\following  Feeding Samsca today  Holding metolazone Aldactone increase protein intake and fluid restriction noted     Acute hypoxic respiratory failure  - she was requiring 2 L O2.    - weaned to RA.     Acute on chronic combined CHF  CAD s/p CABG   Ischemic cardiomyopathy  Severe pulmonary hypertension  - continue aspirin, Plavix, Atorvastatin, Coreg. diuretcis 's per nephrology adjusting     COPD  - continue Spiriva, Symbicort.     CKD stage 3       PAD/PVD  - F/w Dr. Batsheva Lopez     Chronic BLE wounds  - F/w [facility]     DM type 2  - monitor glucose  - continue Lantus 30 units nightly. SSI Coverage.      Subacute/chronic fracture of navicular bone  - X-ray recommends ortho consult and CT of foot. -Ortho has seen patient.   Outpatient podiatry consult recommended.     Obesity       DVT Prophylaxis: Lovenox 30 mg daily    Diet: Dietary Nutrition Supplements: Diabetic Oral Supplement  DIET LOW SODIUM 2 GM;  Code Status: Full Code    Constantine Caldwell 4/25/2021 2:22 PM   Constantine Caldwell 4/25/2021 2:22 PM

## 2021-04-25 NOTE — PROGRESS NOTES
[facility]    Follow up Note           Reason for Consult: Hyponatremia, hypokalemia  Requesting Physician:  Dr. Ananth Vazquez history  Feels ok  Received Samsca 7.5 mg p.o. on 4/23; 15 mg on 4/24 n 4/25    ROS: No chest pain/shortness of breath/fever/nausea/vomiting  PSFH: No Visitor present    Scheduled Meds:   tolvaptan  15 mg Oral Once    insulin lispro  6 Units Subcutaneous TID WC    benztropine  0.5 mg Oral Nightly    ampicillin-sulbactam  1,500 mg Intravenous Q6H    povidone-iodine   Topical Daily    ARIPiprazole  15 mg Oral Daily    aspirin EC  81 mg Oral Daily    atorvastatin  80 mg Oral Nightly    budesonide-formoterol  2 puff Inhalation BID    busPIRone  30 mg Oral BID    carvedilol  3.125 mg Oral BID    buprenorphine-naloxone  1 Film Sublingual BID    clopidogrel  75 mg Oral Daily    doxepin  25 mg Oral Nightly    lamoTRIgine  200 mg Oral BID    midodrine  5 mg Oral BID    pantoprazole  40 mg Oral BID    potassium chloride  20 mEq Oral Daily    tiotropium  2 puff Inhalation Daily    sodium chloride flush  5-40 mL Intravenous 2 times per day    insulin lispro  0-12 Units Subcutaneous TID WC    insulin lispro  0-6 Units Subcutaneous Nightly    insulin glargine  30 Units Subcutaneous Nightly    enoxaparin  30 mg Subcutaneous Daily     Continuous Infusions:   sodium chloride      dextrose      sodium chloride       PRN Meds:.perflutren lipid microspheres, traZODone, glucose, dextrose, glucagon (rDNA), dextrose, sodium chloride flush, sodium chloride, polyethylene glycol, acetaminophen **OR** acetaminophen, prochlorperazine    History of Present Illness on 4/22/2021:    60 y.o. yo female with PMH of COPD, CHF combined, chronic kidney disease stage III with baseline creatinine of 1.3-1.6 along with history of left renal artery stenosis  who is admitted for altered mental status, and nephrology has been consulted for electrolyte disorder  Patient reports that she was 216 pounds last week but now is 207 pounds  She is on multiple medications doxepin and Abilify  Patient sodium was 122 on 3/15 and 16. Her admission sodium was 128 and dropped to 120 at 8 AM on 4/22  Potassium was 2.5 on admission and is up to 3.4    Physical exam:   Constitutional:  VITALS:  BP (!) 127/92   Pulse 92   Temp 96.4 °F (35.8 °C) (Oral)   Resp 18   Ht 5' 4\" (1.626 m)   Wt 211 lb 6.4 oz (95.9 kg)   LMP 10/24/2012   SpO2 100%   BMI 36.29 kg/m²   Gen: alert, awake, nad  Skin: no rash, turgor wnl  Heent:  eomi, mmm  Neck: no bruits or jvd noted, thyroid normal  Cardiovascular:  S1, S2 without m/r/g; trace lower extremity edema  Respiratory: CTA B without w/r/r; respiratory effort normal  Abdomen:  +bs, soft, nt, nd, no hepatosplenomegaly  Neuro/Psy: AAoriented times 3 ; moves all 4 ext  Musculoskeletal:  Rom, muscular strength intact; digits, nails normal    Data/  Recent Labs     04/23/21  0600 04/25/21  0418   WBC 15.1* 15.0*   HGB 14.5 13.3   HCT 42.9 39.6   MCV 89.0 89.7    261     Recent Labs     04/22/21  1237 04/22/21  1237 04/24/21  0145 04/24/21  1114 04/24/21  1641 04/25/21  0418   *   < > 123* 123* 125* 124*   K 3.2*   < > 3.0* 3.8 4.4 4.3   CL 72*   < > 79* 80* 83* 83*   CO2 39*   < > 35* 32 32 28   GLUCOSE 370*   < > 249* 242* 321* 155*   PHOS 3.9  --   --   --   --   --    MG 2.00  --  2.10  --   --   --    BUN 59*   < > 52* 47* 47* 43*   CREATININE 1.6*   < > 1.4* 1.2* 1.3* 1.2*   LABGLOM 33*   < > 39* 46* 42* 46*   GFRAA 40*   < > 47* 56* 51* 56*    < > = values in this interval not displayed. Assessment  -Hyponatremia, patient seems to be volume depleted presentation which has improved with ivf.drop on na on 4/23 is likely from hyperglycemia and decreased solute intake. She is also on multiple medications which can cause increased ADH like doxepin.   With her weight loss continued use of metolazone, this is likely reason for her volume

## 2021-04-25 NOTE — PROGRESS NOTES
Pt in bed, awake, A/O X4. Shift assessment complete, night meds given. No C/o pain at this time. . No other needs expressed. Call light in reach. Will monitor. Alvin Martin      Pt has been educated to hospital falls prevention policy. They are aware they will be assessed every shift, and with any condition changes, by the nursing staff on their ability to perform their ADL's without need for assistance. Pt understands that based on the number of their score they are given a base score that will assign a level of low, medium, or high risk for falls. This patient has rated a high which requires a bed / chair alarm for their safety to prevent a fall. The patient is alert and oriented, and acknowledges and understands the need for intervention but refuses the application and use of the bed / chair alarm that is required per policy. Pt agrees to use call light and wait for help to arrive to assist them to get up when they need to. Call light is within reach and all other safety measures in place.

## 2021-04-26 NOTE — DISCHARGE INSTR - COC
ECHOCARDIOGRAM  01/26/2020    TRANSLUMINAL ANGIOPLASTY Left 10/08/2019    with stenting, at [facility]   [address] Left 04/29/2020    of the [facility] re-occlusion    TUMOR EXCISION      benign tumors X 2 chest & axilla    UPPER GASTROINTESTINAL ENDOSCOPY  4/25/2013    BX barretts, HH, gastritis    UPPER GASTROINTESTINAL ENDOSCOPY  12/10/2015    UPPER GASTROINTESTINAL ENDOSCOPY  01/06/2017    Gastritis    VASCULAR SURGERY Left 12/08/2015    upper extremity and mesenteric angiogram (diagnostic only)    VASCULAR SURGERY Bilateral 07/07/2020    diagnostic lower extremity angiogram only    VASCULAR SURGERY Left 08/04/2020    angioplasty and stent of renal artery    VASCULAR SURGERY Left 08/05/2020    coil embolization of branch renal artery vessel for bleeding    VASCULAR SURGERY Left 09/01/2020    angioplasty and stenting of subclavian artery       Immunization History:   Immunization History   Administered Date(s) Administered    Influenza Vaccine, unspecified formulation 11/04/2016    Influenza Virus Vaccine 12/11/2015, 11/21/2017    Influenza, Corinn Harvinderath, IM, (6 mo and older Fluzone, Flulaval, Fluarix and 3 yrs and older Afluria) 12/19/2013, 10/14/2016, 11/09/2017    Influenza, Corinn Donath, IM, PF (6 mo and older Fluzone, Flulaval, Fluarix, and 3 yrs and older Afluria) 10/01/2019    Pneumococcal Polysaccharide ([id number]) 12/11/2015       Active Problems:  Patient Active Problem List   Diagnosis Code    Type 2 diabetes mellitus with diabetic polyneuropathy, with long-term current use of insulin ([facility]) E11.42, Z79.4    Essential hypertension I10    CLINT (obstructive sleep apnea) G47.33    Tobacco abuse disorder Z72.0    GERD (gastroesophageal reflux disease) K21.9    Anxiety and depression F41.9, F32.9    Hyponatremia E87.1    Iron deficiency anemia D50.9    CAD (coronary artery disease) I25.10    Mitral valve regurgitation I34.0    COPD (chronic obstructive pulmonary disease) ([facility]) J44.9    SHAUNNA (acute kidney injury) ([facility] N17.9    Vitamin D deficiency E55.9    Dyslipidemia E78.5    Abel's esophagus K22.70    Acute on chronic congestive heart failure (HCC) I50.9    Viral hepatitis C B19.20    Pulmonary nodule R91.1    Class 2 severe obesity due to excess calories with serious comorbidity and body mass index (BMI) of 39.0 to 39.9 in adult ([facility]) E66.01, Z68.39    Bipolar disorder ([facility]) F31.9    Pulmonary hypertension ([facility]) I27.20    Lower extremity edema R60.0    Habitual self-excoriation F42.4    Ulcer of left lower leg, limited to breakdown of skin ([facility] L97.921    Ulcer of right leg, limited to breakdown of skin ([facility] [address] [facility] L97.911    Arthritis M19.90    Cardiomyopathy ([facility]) I42.9    Chronic systolic CHF (congestive heart failure) ([facility]) I50.22    Chronic renal impairment N18.9    Peripheral venous insufficiency I87.2    PAD (peripheral artery disease) ([facility]) I73.9    Acute kidney injury superimposed on CKD ([facility]) N17.9, N18.9    Morbid obesity with BMI of 40.0-44.9, adult ([facility]) E66.01, Z68.41    CKD (chronic kidney disease) stage 4, GFR 15-29 ml/min ([facility]) N18.4    Hypokalemia E87.6    Dyspnea on exertion R06.00    Ischemic heart disease I25.9    Moderate protein-calorie malnutrition ([facility]) E44.0    DKA, type 2, not at goal [facility]) E11.10    Acute on chronic systolic CHF (congestive heart failure) ([facility]) S15.11    Acute systolic CHF (congestive heart failure) ([facility]) I50.21    CHF (congestive heart failure), NYHA class I, acute on chronic, combined ([facility]) I50.43    Cellulitis L03.90    Closed nondisplaced fracture of navicular bone of left foot S92.255A       Isolation/Infection:   Isolation          No Isolation        Patient Infection Status     Infection Onset Added Last Indicated Last Indicated By Review Planned Expiration Resolved Resolved By    None active    Resolved    COVID-19 Rule Out 01/11/21 01/11/21 01/11/21 COVID-19 (Ordered)   01/11/21 Rule-Out Test Resulted    COVID-19 04/23/21 1622   Wound Assessment Pink/red;Slough 04/23/21 1622   Drainage Amount Small 04/23/21 1622   Drainage Description Serosanguinous 04/23/21 1622   Odor None 04/23/21 1622   Shaye-wound Assessment Blanchable erythema;Edematous 04/23/21 1622   Margins Attached edges; Defined edges 04/23/21 1622   Wound Thickness Description not for Pressure Injury Full thickness 04/23/21 1622   Number of days: 4       Wound 04/22/21 Toe (Comment  which one) Left cluster tip of 1 + 2, dorsal on toe 3 brown and yellow slough. (Active)   Wound Image    04/23/21 1622   Wound Etiology Diabetic 04/23/21 1622   Dressing Status Clean;Dry; Intact 04/26/21 0844   Wound Cleansed Betadine/povidone iodine 04/26/21 0844   Dressing/Treatment Dry dressing;Roll gauze 04/24/21 2014   Offloading for Diabetic Foot Ulcers Other (comment) 04/23/21 1622   Dressing Change Due 04/24/21 04/23/21 1622   Wound Length (cm) 6 cm 04/23/21 1622   Wound Width (cm) 4.5 cm 04/23/21 1622   Wound Depth (cm) 0.1 cm 04/23/21 1622   Wound Surface Area (cm^2) 27 cm^2 04/23/21 1622   Wound Volume (cm^3) 2.7 cm^3 04/23/21 1622   Distance Tunneling (cm) 0 cm 04/23/21 1622   Tunneling Position ___ O'Clock 0 04/23/21 1622   Undermining Starts ___ O'Clock 0 04/23/21 1622   Undermining Ends___ O'Clock 0 04/23/21 1622   Undermining Maxium Distance (cm) 0 04/23/21 1622   Wound Assessment Eschar dry;Slough 04/23/21 1622   Drainage Amount Small 04/23/21 1622   Drainage Description Serosanguinous 04/23/21 1622   Odor None 04/23/21 1622   Shaye-wound Assessment Dry/flaky; Intact 04/23/21 1622   Margins Attached edges; Defined edges 04/23/21 1622   Wound Thickness Description not for Pressure Injury Partial thickness 04/23/21 1622   Number of days: 4       Wound 04/22/21 Leg Right (Active)   Wound Image   04/23/21 1622   Wound Etiology Venous 04/23/21 1622   Dressing Status Clean;Dry; Intact 04/26/21 0844   Wound Cleansed Cleansed with saline 04/23/21 1622   Dressing/Treatment Foam ***  Oxygen Therapy:  {Therapy; copd oxygen:79959}  Ventilator:    { OZZIE Vent WLUE:908441566}    Rehab Therapies: {THERAPEUTIC INTERVENTION:2631876837}  Weight Bearing Status/Restrictions: {[facility] Weight Bearin}  Other Medical Equipment (for information only, NOT a DME order):  {EQUIPMENT:021248962}  Other Treatments: ***    Patient's personal belongings (please select all that are sent with patient):  {[facility] DME Belongings:441321181}    RN SIGNATURE:  {Esignature:587336280}    CASE MANAGEMENT/SOCIAL WORK SECTION    Inpatient Status Date: ***    Readmission Risk Assessment Score:  Readmission Risk              Risk of Unplanned Readmission:        47           Discharging to Facility/ Agency   · Name:   · Address:  · Phone:  · Fax:    Dialysis Facility (if applicable)   · Name:  · Address:  · Dialysis Schedule:  · Phone:  · Fax:    / signature: {Esignature:895557679}    PHYSICIAN SECTION    Prognosis: {Prognosis:4275763115}    Condition at Discharge: [address] [place] [address] Patient Condition:129812603}    Rehab Potential (if transferring to Rehab): {Prognosis:8005356557}    Recommended Labs or Other Treatments After Discharge: ***    Physician Certification: I certify the above information and transfer of Jacquie Howe  is necessary for the continuing treatment of the diagnosis listed and that she requires {Admit to Appropriate Level of Care:60596} for {GREATER/LESS:293639740} 30 days.      Update Admission H&P: {P DME Changes in MPLTV:450616892}    PHYSICIAN SIGNATURE:  {Esignature:882179753}

## 2021-04-26 NOTE — CARE COORDINATION
INTERDISCIPLINARY PLAN OF CARE CONFERENCE    Date/Time: 2021 11:04 AM  Completed by: Coy Albert      Patient Name:  Marlena Mojica  YOB: 1965  Admitting Diagnosis: Cellulitis [L03.90]     Admit Date/Time:  2021  8:27 PM    Chart reviewed. Interdisciplinary team contacted or reviewed plan related to patient progress and discharge plans. Disciplines included Case Management, Nursing, and Dietitian. Current Status:ongoing  PT/OT recommendation for discharge plan of care: n/a    Expected D/C Disposition:  Home  Confirmed plan with patient and/or family Yes confirmed with: (name) pt, and she stated that CM could speak in front of her male friend  Met with:pt--and she stated that CM could speak in front of her male friend  Discharge Plan Comments: Reviewed chart. Role of discharge planner explained and patient verbalized understanding. Pt also stated that CM could she stated that CM could speak in front of her male friend. Pt is from home with her male friend and plans to return. Pt declines HHC. Pt states she is independent. Pt is on PO abx. Pt is on RA at 94%. Await nephro to see and eval pt before deciding if able to d/c per Dr. Rush Felder. Addendum 1435: Dr. Rashaad Abdullahi saw and evaluated pt and approved for her to d/c today. Home O2 in place on admit: No  Pt informed of need to bring portable home O2 tank on day of discharge for nursing to connect prior to leaving:  Not Indicated  Verbalized agreement/Understanding:  Not Indicated    DISCHARGE ORDER  Date/Time 2021 2:50 PM  Completed by: Coy Albert    Patient Name: Marlena See      : 1965  Admitting Diagnosis: Cellulitis [L03.90]      Admit order Date and Status:2021 inpt  (verify MD's last order for status of admission)      Noted discharge order.    If applicable PT/OT recommendation at Discharge: n/a  DME recommendation by PT/OT:n/a  Confirmed discharge plan (pt): Yes  with whom____________pt___  If pt confirmed DC plan does family need to be contacted by CM Yes if yes who___Pt gave approval for CM to speak in front of pt's male friend, who has been by her bedside the entire hospital stay during hospital visiting hours___  Discharge Plan: Reviewed chart. Role of discharge planner explained and patient and male friend  verbalized understanding. Discharge order is noted. Pt is being d/c'd to home today. Pt's O2 sats are 95% on RA. Pt is discharging on oral abx. Pt declines HHC. No further discharge needs needed or noted. Reviewed chart. Role of discharge planner explained and patient verbalized understanding. Discharge order is noted. Has Home O2 in place on admit:  No  Informed of need to bring portable home O2 tank on day of discharge for nursing to connect prior to leaving:   Not Indicated  Verbalized agreement/Understanding:   Not Indicated    Discharge timeout done with Mick Cuevas RN. All discharge needs and concerns addressed.

## 2021-04-26 NOTE — PROGRESS NOTES
Patient discharged via wheelchair to private auto w/ belongings, discharge instructions in stable condition.

## 2021-04-26 NOTE — PROGRESS NOTES
Patient okayed for discharge. Discharge instructioins reviewed w/ patient  -  Denied questions. Telemetry removed and returned to [facility].

## 2021-04-26 NOTE — PROGRESS NOTES
1. 3* 1.2* 1.6*       CULTURES  COVID: not detected  Blood 2: Streptococcus viridans group 1/2 on 4/21  Blood 1: pending    RADIOLOGY       Limited Echo 8/19/2020   Summary   Limited only f/u for LVEF and mitral regurgitation.   Technically difficult examination.  [facility] left ventricular systolic function is severely reduced with an ejection   fraction of 25%.   There is hypokinesis of the apex, apical lateral, apical septum,   anterioseptum and anterior walls.   Compared to previous study from 7-1-2020 no changes noted in left   ventricular function.   Moderate mitral regurgitation. Assessment/Plan:    Streptlococcus viridrns bacteremia1/2  Isolated  only 1 bottle - likely contamination   Pt was treated with unasyn as she has cellulitis and skin picking with multiple skin wounds   No fevers       Hyponatremia   - likely SIADH with multiple psych meds , Na at 121>124  -nephrolgy consulted patient has been following  given Samsca and fluid restriction   Holding metolazone Aldactone increase protein intake     Mild hypoxia  - likely CHF   - she was requiring 2 L O2.    - weaned to RA.- acute resp failure ruled out      Acute on chronic combined CHF  CAD s/p CABG   Ischemic cardiomyopathy  Severe pulmonary hypertension  - continue aspirin, Plavix, Atorvastatin, Coreg. diuretcis 's per nephrology adjusting     COPD  - continue Spiriva, Symbicort.     CKD stage 3a     PAD/PVD  - F/w Dr. Blanco Vasquez     Chronic BLE wounds  - F/w [address] [facility]     DM type 2  - monitor glucose  - continue Lantus 30 units nightly. SSI Coverage. Depression - resume home meds - abilify, buspar , lamictal     Subacute/chronic fracture of navicular bone  - X-ray recommends ortho consult and CT of foot. -Ortho has seen patient.   Outpatient podiatry consult recommended.         Obesity  Recommend weight loss      DVT Prophylaxis: Lovenox 30 mg daily    Diet: Dietary Nutrition Supplements: Diabetic Oral Supplement  DIET LOW SODIUM 2 GM;  Code Status: Full Code    Ngozi Shukla 4/26/2021 9:58 AM   Ngozi Shukla 4/26/2021 9:58 AM

## 2021-04-26 NOTE — PROGRESS NOTES
04/25/21 2030   Vital Signs   Temp 98.2 °F (36.8 °C)   Temp Source Oral   Pulse 94   Heart Rate Source Monitor   Resp 18   /79   BP Location Right upper arm   Patient Position Sitting   Level of Consciousness Alert (0)   MEWS Score 1   Oxygen Therapy   SpO2 98 %   O2 Device None (Room air)   Pt is A+Ox4 and lethargic. Medications given, see MAR. Head to toe completed, see flowsheets. No needs or discomforts stated at this time. Call light in easy reach, bedside table in easy reach, and bed in lowest position.   Elana Alexis, RN

## 2021-04-26 NOTE — DISCHARGE INSTR - DIET

## 2021-04-29 NOTE — TELEPHONE ENCOUNTER
2nd Attempt; No Answer- Unable to leave HIPAA compliant voicemail with Non-Urgent Heart Failure Resource Line number for call back. \"The mailbox is full and cannot accept any messages at this time. \" Electronically signed by Ana Olguin RN on 4/29/2021 at 3:56 PM        3rd Attempt; No Answer- Unable to leave HIPAA compliant voicemail with Non-Urgent Heart Failure Resource Line number for call back. \"The mailbox is full and cannot accept any messages at this time. \" Electronically signed by Ana Olguin RN on 4/29/2021 at 4:01 PM

## 2021-04-30 NOTE — PROCEDURES
[address]                 [address] [address]                               PULMONARY FUNCTION    PATIENT NAME: Paige BADILLO                    :        1965  MED REC NO:   [id number]                          ROOM:  ACCOUNT NO:   [de-identified]                           ADMIT DATE: 2021  PROVIDER:     Kassy Merino MD    DATE OF PROCEDURE:  2021    ORDERING PROVIDER:  Shefali Monaco MD    GIVEN DIAGNOSIS:  Chronic cough. FINDINGS:  1. Spirometry: The FEV1 is 1.32 liters or 50% of predicted. The FVC  is 1.86 liters or 55% of predicted. The FEV1/FVC ratio is 71%. There  is a significant response to inhaled bronchodilators with improvement in  the _____ by 12%. 2.  Plethysmography:  The total lung capacity is 3.58 liters or 68% of  predicted. 3.  Diffusion capacity:  The diffusion capacity for carbon monoxide is  11.54 mL/min/mmHg, which is 49% of predicted. 4.  Flow volume loops: The tracings appear to show a restrictive defect  pattern. IMPRESSION:  1. There is no evidence of an obstructive lung defect. 2.  There is a mild restrictive ventilatory defect. 3.  There is mild reduction in diffusion capacity. 4.  Taken together, these pulmonary function tests could represent  interstitial lung disease given the combination of restrictive defect  and reduced diffusion capacity.         Nicky Robison MD    D: 2021 12:30:00       T: 2021 13:54:34     [id number]  Job#: [id number]     Doc#: [id number]    CC:

## 2021-04-30 NOTE — TELEPHONE ENCOUNTER
Attempted to contact pt to relay message. No  and vm did not allow a message to be left. Will try to contact at another time.

## 2021-04-30 NOTE — TELEPHONE ENCOUNTER
----- Message from TRAY Hayden - CNP sent at 4/29/2021  3:31 PM EDT -----  Renal function much worse. Na and K better. BNP \"heart failure number\" worse. Per Dr. Francois Knife note on day of discharge 4/26/2021:   - Torsemide 50 mg daily when her weight increases over 210 lbs  - Hold metolazone and spironolactone  - Increase protein intake  - Fluid restriction 1200 ml/day from 4/26  - Trend labs, bp's, urine output   Okay for discharge  Follow-up arranged with us in office with labs in one week    Please find out if this is what she is taking.     Thank you, Clay Puga

## 2021-05-06 NOTE — TELEPHONE ENCOUNTER
Patient cancelled appointment on 8/27/2020 with Dr Andre Covert for ct f/u . Reason: pt is currently hospitalized    Patient did not reschedule appointment. Will call pt in 1 week to offer to r/s and see if ct chest was done as an IP    Last OV 2/25/2020        Assessment:       · New nodules on CT 2/24/2020 -favor inflammatory. We will follow-up radiographically   · Chronic bronchitis with probable emphysema  · CAD, ischemic cardiomyopathy, mitral valve insufficiency, and chronic systolic heart failure (EF 30-35%). Post CABG 1/27/2020   · Moderate CLINT. CPAP 8 cm H2O. Declined retrying CPAP and alternative therapy.   ONPO 11/6/2019 with no significant desaturation  · Mild restrictive defect with decreased diffusion capacity- likely due to obesity   · 45 pack year smoking - quit 8/2019       Plan:       · Continue Spiriva daily and Albuterol 2 puffs Q4-6 hrs PRN  · Advised to continue with smoking cessation  · CT chest 8/2020   · Patient is up to date with Pneumococcal vaccine and influenza vaccine
Pt completed ct chest
Pt had a VQ lung scan as an IP on 8/19/2020. Ok to r/s ct chest that was due to be done in 8/2020?
Spoke with pt, rescheduled CT chest to 9/11/20. Watch to make sure pt keeps appt, as pt f/u had to be rescheduled to 10/18/20 due to no available openings .
Will need CT chest as planned  VQ scan will not be a replacement for CT
No

## 2021-05-20 NOTE — TELEPHONE ENCOUNTER
Medication:   Requested Prescriptions     Pending Prescriptions Disp Refills    insulin aspart (NOVOLOG FLEXPEN) 100 UNIT/ML injection pen 5 pen 3     Sig: Inject 10 Units into the skin 3 times daily (before meals)         Last appt: 02/11/2021   Next appt: Visit date not found    Last Labs DM:   Lab Results   Component Value Date    LABA1C 14.8 03/15/2021       Insurance prefers novolog vs humalog

## 2021-05-26 NOTE — TELEPHONE ENCOUNTER
[facility] CALLED REGARDING AN INSURANCE PROBLEM WITH PATIENT'S FREESTYLE LITE NO LONGER BEING COVERED. THEY DO COVER ONE TOUCH ULTRA.   PATIENT NEEDS A NEW ORDER AUTHORIZED FOR A METER, LANCETS AND TEST STRIPS.        [facility] [address] [phone]

## 2021-05-26 NOTE — TELEPHONE ENCOUNTER
Medication:   Requested Prescriptions     Pending Prescriptions Disp Refills    Blood Glucose Monitoring Suppl (ONE TOUCH ULTRA 2) w/Device KIT 1 kit 0     Sig: USE TO MONITOR BLOOD GLUCOSE LEVELS    ONE TOUCH ULTRASOFT LANCETS MISC 150 each 3     Sig: USE TO TEST BLOOD GLUCOSE LEVELS 4 TIMES DAILY    blood glucose test strips (ASCENSIA AUTODISC VI;ONE TOUCH ULTRA TEST VI) strip 150 each 3     Sig: USE TO MONITOR BLOOD GLUCOSE LEVELS 4 TIMES DAILY         Last appt: 2/11/2021   Next appt: Visit date not found    Last Labs DM:   Lab Results   Component Value Date    LABA1C 11.9 05/20/2021

## 2021-06-07 NOTE — ED NOTES
Discharge instructions reviewed with Pt. Pt verbalizes understanding at this time. Prescriptions/medications reviewed with pt at this time. VS as noted. Pt condition stable at this time. No concerns voiced.       Jeimy Whiting RN  06/07/21 4060

## 2021-06-07 NOTE — ED PROVIDER NOTES
CHIEF COMPLAINT  Gout (Pt reports saw ortho on friday, pcp last week. Pt reports was diagnosed with GOUT, pt reports pain hasnt improved. )      HISTORY OF PRESENT ILLNESS  Alta Rosales is a 60 y.o. female with a history of acute kidney injury; CHF; alcohol dependence; CAD; COPD; type 2 diabetes; chronic peripheral vascular disease to her lower extremities with venous stasis who presents to the ED complaining of left wrist pain. She saw her orthopedic surgeon for an injection to her shoulder and also complained of left wrist pain. There was warmth and redness there. She was given a splint and began treatment for gout. This was about 4 days ago and her pain medication ran out and has been taking colchicine without any relief. No other complaints, modifying factors or associated symptoms. I have reviewed the following from the nursing documentation.     Past Medical History:   Diagnosis Date    Acute blood loss anemia 8/5/2020    Acute kidney injury ([address] 12/25/2019    Acute kidney injury superimposed on CKD ([address] 8/5/2020    Acute on chronic combined systolic and diastolic congestive heart failure ([address] 1/10/2020    Acute on chronic right-sided heart failure ([address] 08/2020    Alcohol dependence ([address] 3/10/2010    Arthritis     Asthma     CAD (coronary artery disease)     Cellulitis 6/3/2020    COPD (chronic obstructive pulmonary disease) (HCC)     Diabetic ulcer of left foot associated with type 2 diabetes mellitus, limited to breakdown of skin ([address] 1/18/2020    Diabetic ulcer of toe of right foot associated with type 2 diabetes mellitus ([address] 1/18/2020    Left renal artery stenosis ([address] 08/2020    MI (myocardial infarction) ([address] 10/07/2019    NSTEMI    Positive FIT (fecal immunochemical test) 2/28/2020    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 steal syndrome 09/01/2020    Traumatic perinephric hematoma, left, initial encounter 8/4/2020     Past Surgical History: renal artery vessel for bleeding    VASCULAR SURGERY Left 09/01/2020    angioplasty and stenting of subclavian artery     Family History   Problem Relation Age of Onset    Heart Disease Maternal Grandmother     Cancer Mother         lung and brain cancer    Cancer Maternal Aunt         lung and brain cancer     Social History     Socioeconomic History    Marital status: Single     Spouse name: Not on file    Number of children: Not on file    Years of education: Not on file    Highest education level: Not on file   Occupational History    Not on file   Tobacco Use    Smoking status: Current Every Day Smoker     Packs/day: 1.00     Years: 30.00     Pack years: 30.00     Types: Cigarettes    Smokeless tobacco: Never Used    Tobacco comment: 0.5ppd as of 2/22/21   Vaping Use    Vaping Use: Never used   Substance and Sexual Activity    Alcohol use: No     Comment: quit 2006     Drug use: Never    Sexual activity: Yes     Partners: Male   Other Topics Concern    Not on file   Social History Narrative    Not on file     Social Determinants of Health     Financial Resource Strain:     Difficulty of Paying Living Expenses:    Food Insecurity:     Worried About Running Out of Food in the Last Year:     Ran Out of Food in the Last Year:    Transportation Needs:     Lack of Transportation (Medical):      Lack of Transportation (Non-Medical):    Physical Activity:     Days of Exercise per Week:     Minutes of Exercise per Session:    Stress:     Feeling of Stress :    Social Connections:     Frequency of Communication with Friends and Family:     Frequency of Social Gatherings with Friends and Family:     Attends Confucianist Services:     Active Member of Clubs or Organizations:     Attends Club or Organization Meetings:     Marital Status:    Intimate Partner Violence:     Fear of Current or Ex-Partner:     Emotionally Abused:     Physically Abused:     Sexually Abused:      No current facility-administered medications for this encounter. Current Outpatient Medications   Medication Sig Dispense Refill    predniSONE (DELTASONE) 10 MG tablet 40 mg po x 2 days then 20 mg po x 2 days then 10 mg po x 2 days total of 6 days 44 tablet 0    Blood Glucose Monitoring Suppl (ONE TOUCH ULTRA 2) w/Device KIT USE TO MONITOR BLOOD GLUCOSE LEVELS 1 kit 0    ONE TOUCH ULTRASOFT LANCETS MISC USE TO TEST BLOOD GLUCOSE LEVELS 4 TIMES DAILY 150 each 3    blood glucose test strips (ASCENSIA AUTODISC VI;ONE TOUCH ULTRA TEST VI) strip USE TO MONITOR BLOOD GLUCOSE LEVELS 4 TIMES DAILY 150 each 3    insulin aspart (NOVOLOG FLEXPEN) 100 UNIT/ML injection pen Inject 10 Units into the skin 3 times daily (before meals) 5 pen 3    QUEtiapine (SEROQUEL XR) 50 MG extended release tablet Take 1 mg by mouth nightly Not sure of dose      budesonide-formoterol (SYMBICORT) 160-4.5 MCG/ACT AERO Inhale 2 puffs into the lungs 2 times daily 1 Inhaler 5    torsemide (DEMADEX) 100 MG tablet Take 1 tablet by mouth daily 90 tablet 1    nitroGLYCERIN (NITROSTAT) 0.4 MG SL tablet Place 1 tablet under the tongue every 5 minutes as needed for Chest pain up to max of 3 total doses.  If no relief after 1 dose, call 911. 25 tablet 0    Insulin Degludec (TRESIBA FLEXTOUCH) 100 UNIT/ML SOPN Inject 30 Units into the skin nightly 10 pen 5    Insulin Pen Needle (B-D ULTRAFINE III SHORT PEN) 31G X 8 MM MISC Five shots a day 200 each 5    insulin lispro (HUMALOG KWIKPEN U-200) 200 UNIT/ML SOPN pen Inject 10 Units into the skin 3 times daily (before meals) 2 pen 5    metOLazone (ZAROXOLYN) 5 MG tablet Take 1 tablet by mouth three times a week On Mon Weds and Friday 1 tablet 0    carvedilol (COREG) 3.125 MG tablet TAKE 1 TABLET BY MOUTH TWICE A DAY WITH MEALS 180 tablet 3    atorvastatin (LIPITOR) 80 MG tablet TAKE 1 TABLET BY MOUTH EVERY DAY AT NIGHT 90 tablet 3    doxepin (SINEQUAN) 25 MG capsule TAKE 1 CAPSULE BY MOUTH EVERY DAY AT NIGHT      KLOR-CON M20 20 MEQ extended release tablet TAKE 2 TABLETS BY MOUTH 4 TIMES DAILY 720 tablet 1    methocarbamol (ROBAXIN) 500 MG tablet Take 500 mg by mouth 2 times daily      midodrine (PROAMATINE) 5 MG tablet Take 5 mg by mouth 2 times daily      spironolactone (ALDACTONE) 50 MG tablet Take 50 mg by mouth daily      Blood Glucose Monitoring Suppl (FREESTYLE FREEDOM LITE) w/Device KIT Use to test glucose 4 times a day 1 kit 0    blood glucose test strips (FREESTYLE LITE) strip USE TO CHECK BLOOD GLUCOSE LEVELS FOUR TIMES DAILY 200 strip 10    tiotropium (SPIRIVA RESPIMAT) 2.5 MCG/ACT AERS inhaler INHALE 2 PUFFS INTO THE LUNGS DAILY 1 Inhaler 6    buprenorphine-naloxone (SUBOXONE) 8-2 MG FILM SL film Place 1 Film under the tongue 2 times daily.  benztropine (COGENTIN) 1 MG tablet Take 1 mg by mouth nightly       INSULIN SYRINGE .5CC/29G 29G X 1/2\" 0.5 ML MISC 1 each by Does not apply route daily 100 each 3    pantoprazole (PROTONIX) 40 MG tablet Take 1 tablet by mouth 2 times daily 60 tablet 0    clopidogrel (PLAVIX) 75 MG tablet TAKE 1 TABLET BY MOUTH EVERY DAY 30 tablet 5    magnesium oxide (MAG-OX) 400 (240 Mg) MG tablet TAKE 1 TABLET ONCE DAILY 30 tablet 11    busPIRone (BUSPAR) 30 MG tablet Take 30 mg by mouth 2 times daily       aspirin EC 81 MG EC tablet Take 1 tablet by mouth daily 30 tablet 3    ARIPiprazole (ABILIFY) 15 MG tablet Take 15 mg by mouth daily       lamoTRIgine (LAMICTAL) 200 MG tablet Take 200 mg by mouth 2 times daily        Allergies   Allergen Reactions    Lisinopril Other (See Comments)     Severe hypotension       REVIEW OF SYSTEMS  10 systems reviewed, pertinent positives per HPI otherwise noted to be negative. PHYSICAL EXAM  BP (!) 129/97   Pulse 110   Temp 98 °F (36.7 °C) (Oral)   Resp 20   Ht 5' 4\" (1.626 m)   Wt 223 lb (101.2 kg)   LMP 10/24/2012   SpO2 97%   BMI 38.28 kg/m²   GENERAL APPEARANCE: Awake and alert. Cooperative.  No acute distress. HEAD: Normocephalic. Atraumatic. EYES: PERRL. EOM's grossly intact. Test of Skew:  ENT: Mucous membranes are moist.   NECK: Supple, trachea midline. HEART: RRR. Normal S1S2, no rubs, gallops, or murmurs noted  LUNGS: Respirations unlabored. CTAB. Good air exchange. No wheezes, rales, or rhonchi. Speaking comfortably in full sentences. ABDOMEN: Soft. Non-distended. Non-tender. No guarding or rebound. Normal bowel sounds. EXTREMITIES: As per patient chronic lower extremity edema with erythema and venous stasis unchanged; healing open venous stasis wounds; globally poor perfusion to her lower extremities; left wrist with radial erythema and mild swelling; exquisite tenderness to range of motion to the wrist; there is also some tenderness to her left ulnar hand where the volar splint that she has been wearing is caused some swelling. MAEE. No acute deformities. SKIN: Warm and dry. No acute rashes. NEUROLOGICAL: Alert and oriented X 3. CN II-XII intact. No gross facial drooping. Strength 5/5, sensation intact. Normal coordination. No pronator drift. No truncal instability*; Gait normal.   PSYCHIATRIC: Normal mood and affect. LABS  I have reviewed all labs for this visit.    Results for orders placed or performed during the hospital encounter of 06/07/21   CBC Auto Differential   Result Value Ref Range    WBC 9.6 4.0 - 11.0 K/uL    RBC 4.60 4.00 - 5.20 M/uL    Hemoglobin 13.4 12.0 - 16.0 g/dL    Hematocrit 40.9 36.0 - 48.0 %    MCV 88.9 80.0 - 100.0 fL    MCH 29.0 26.0 - 34.0 pg    MCHC 32.7 31.0 - 36.0 g/dL    RDW 15.5 (H) 12.4 - 15.4 %    Platelets 116 761 - 774 K/uL    MPV 8.2 5.0 - 10.5 fL    Neutrophils % 86.1 %    Lymphocytes % 6.7 %    Monocytes % 5.9 %    Eosinophils % 0.9 %    Basophils % 0.4 %    Neutrophils Absolute 8.3 (H) 1.7 - 7.7 K/uL    Lymphocytes Absolute 0.6 (L) 1.0 - 5.1 K/uL    Monocytes Absolute 0.6 0.0 - 1.3 K/uL    Eosinophils Absolute 0.1 0.0 - 0.6 K/uL    Basophils Absolute 0.0 0.0 - 0.2 K/uL   Comprehensive Metabolic Panel w/ Reflex to MG   Result Value Ref Range    Sodium 135 (L) 136 - 145 mmol/L    Potassium reflex Magnesium 3.5 3.5 - 5.1 mmol/L    Chloride 90 (L) 99 - 110 mmol/L    CO2 33 (H) 21 - 32 mmol/L    Anion Gap 12 3 - 16    Glucose 224 (H) 70 - 99 mg/dL    BUN 63 (H) 7 - 20 mg/dL    CREATININE 1.6 (H) 0.6 - 1.1 mg/dL    GFR Non- 33 (A) >60    GFR  40 (A) >60    Calcium 10.0 8.3 - 10.6 mg/dL    Total Protein 7.8 6.4 - 8.2 g/dL    Albumin 4.1 3.4 - 5.0 g/dL    Albumin/Globulin Ratio 1.1 1.1 - 2.2    Total Bilirubin 0.8 0.0 - 1.0 mg/dL    Alkaline Phosphatase 176 (H) 40 - 129 U/L    ALT 11 10 - 40 U/L    AST 15 15 - 37 U/L    Globulin 3.7 g/dL   Sedimentation Rate   Result Value Ref Range    Sed Rate 46 (H) 0 - 30 mm/Hr   C-reactive protein   Result Value Ref Range    CRP 42.4 (H) 0.0 - 5.1 mg/L   Magnesium   Result Value Ref Range    Magnesium 1.50 (L) 1.80 - 2.40 mg/dL             RADIOLOGY  X-RAYS:  I have reviewed radiologic plain film image(s). ALL OTHER NON-PLAIN FILM IMAGES SUCH AS CT, ULTRASOUND AND MRI HAVE BEEN READ BY THE RADIOLOGIST. XR WRIST LEFT (MIN 3 VIEWS)   Final Result   No acute osseous abnormality. Rechecks: Physical assessment performed. Pain improved with treatment in the emergency department    ED COURSE/MDM  Patient seen and evaluated. Old records reviewed. Labs and imaging reviewed and results discussed with patient. Patient was given steroids and Norco in the ED with good symptomatic relief. Patient was reassessed as noted above . Presentation more consistent with gout or bursitis than septic joint. I discussed the patient's presentation with orthopedics on-call. They recommended adding a prednisone taper to her treatment urgent follow-up in clinic. Laboratory markers for her are chronically elevated. Plan of care discussed with patient and family.  Patient and family in agreement with plan.  Pacifically reviewed with patient and family when to return immediately to the emergency room for any new or worsening symptoms. Patient was given scripts for the following medications. I counseled patient how to take these medications. Discharge Medication List as of 6/7/2021  4:56 PM      START taking these medications    Details   predniSONE (DELTASONE) 10 MG tablet 40 mg po x 2 days then 20 mg po x 2 days then 10 mg po x 2 days total of 6 days, Disp-44 tablet, R-0Normal             CLINICAL IMPRESSION  1. Acute idiopathic gout, unspecified site        Blood pressure (!) 129/97, pulse 110, temperature 98 °F (36.7 °C), temperature source Oral, resp. rate 20, height 5' 4\" (1.626 m), weight 223 lb (101.2 kg), last menstrual period 10/24/2012, SpO2 97 %, not currently breastfeeding. DISPOSITION  Tere Edwards was discharged to home in stable condition.       Corbin Figueroa MD  06/08/21 [id number]

## 2021-06-17 NOTE — PROGRESS NOTES
Pt here via w/c per family for a stat dose of IV Lasix  Pt was just seen by cardiology and she reports she has gained 30 lbs over a short period of time Pt also reports she has been and still is having severe back pain for which she is seeing a chiropractor  Pt rates her back pain a 10 out of 10 at this time  Pt denies any questions or concerns about the infusion today as she has had it before and it did help   IV # 22 started on 1st attempt to [facility] Hand per Dina Brown RN that INT fell out so IV # 22 was restarted in LFA on 1st attempt per Dina Brown RN  Pt rose mary Quiros Divine 80 mg was started via IVPB without difficulty  Lasix bag was covered during infusion  Pt rose mary well  Family at side  Will monitor

## 2021-06-17 NOTE — TELEPHONE ENCOUNTER
Called patient regarding schedule arterial duplex at [place]. Arrive at 2:30pm. 3:00pm is study.  aimee

## 2021-06-17 NOTE — PROGRESS NOTES
Lasix has infused and tubing was flushed with NS IV removed  Cath tip intact  Pressure then pressure dressing applied and secured with a coban dressing  IV site unremarkable  Pt rose mary well   Pt was up to BR  Gait steady  Discharge instructions reviewed with pt and copy was given  Understanding verbalized then pt was discharged via w/c with family in stable condition

## 2021-06-17 NOTE — TELEPHONE ENCOUNTER
Attempted to contact pt, unable to leave a message. Will relay lab results once we get in contact with pt.

## 2021-06-17 NOTE — PROGRESS NOTES
[address]   Cardiac Evaluation    Primary Care Doctor:  Deena Pettit    Chief Complaint   Patient presents with    3 Month Follow-Up    Coronary Artery Disease    Congestive Heart Failure    Other     shortness of breath        History of Present Illness:   I had the pleasure of seeing [facility] in follow up for CAD s/p PCI to LAD and LCx 2018, CABG X1 (LIMA to LAD,Jan 2020 for restenosis of LAD and LCx and significant dz in RCA). Hx of ICM, sCHF s/p CardioMEMs implant (2/2019), MR, PAD, CKD, DM, COPD, leg wounds and gout. She was last seen here in Feb 2021. She was hospitalized in April with shaking, hyponatremia hypokalemia, SHAUNNA and bilateral lower extremity cellulitis. She was seen by nephrology medications (including diuretics) were adjusted. She was then seen in the ED on June 7, 2021 with gout and was given a steroid taper. She is not doing well. She has gained up to 245 lbs, was 216 lbs at appointment with nephrology. The steroids also made her \"crazy\" and messed with her other medicines. She has not been as active due to the gout pain and now back pain. She has been to the chiropractor for last 2 days. She can't get comfortable. The back pain is causing nausea. She is not taking her suboxone. She is not drinking alcohol. She spoke with Dr. Shwetha Duong and has increased the torsemide to 100 mg daily and taking metolazone every 3 days. She is also on the spironolactone 50 mg daily (she thinks). She feels like she is urinating more. Her weight at home was 240 lbs 3 days ago. She is to see Dr. Torsten Rodriguez tomorrow for legs, referred by podiatrist. Has leg wounds, left > right. She has shortness of breath at rest, + orthopnea (sleeps propped up on sofa). Her cough did get better. Still on antibiotics for the gout. Still on prednisone as well. Blood sugars are labile, mostly up with the steroids. She has torn right rotator cuff and needs surgery.   Also has a ventral hernia that is getting bigger but no-one will do surgery on her. [address] [place] describes symptoms including dyspnea, orthopnea, PND, fatigue, edema but denies chest pain, palpitations, early saiety, syncope. NYHA:   III  ACC/ AHA Stage:    C    Past Medical History:   has a past medical history of Acute blood loss anemia, Acute kidney injury ([address] Acute kidney injury superimposed on CKD ([address] Acute on chronic combined systolic and diastolic congestive heart failure ([address] Acute on chronic right-sided heart failure ([address] Alcohol dependence ([address] Arthritis, Asthma, CAD (coronary artery disease), Cellulitis, COPD (chronic obstructive pulmonary disease) ([address] Diabetic ulcer of left foot associated with type 2 diabetes mellitus, limited to breakdown of skin ([address] Diabetic ulcer of toe of right foot associated with type 2 diabetes mellitus ([address] Left renal artery stenosis ([address] MI (myocardial infarction) ([address] Positive FIT (fecal immunochemical test), 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 Surgical History:   has a past surgical history that includes Tonsillectomy; Cholecystectomy; tumor excision; Upper gastrointestinal endoscopy (4/25/2013); Upper gastrointestinal endoscopy (12/10/2015); Upper gastrointestinal endoscopy (01/06/2017); Arterial bypass surgry (Left, 01/06/2016); Cardiac catheterization (03/27/2018); Coronary angioplasty with stent (03/16/2018); Rotator cuff repair (Left, 04/22/2016); Coronary angioplasty with stent (01/03/2018); Insertable Cardiac Monitor (02/14/2019); femoral bypass (Right, 5/16/2019); transluminal angioplasty (Left, 10/08/2019); Cardiac catheterization (01/20/2020); Coronary artery bypass graft (N/A, 1/27/2020); vascular surgery (Left, 12/08/2015); transluminal angioplasty (Left, 04/29/2020); vascular surgery (Bilateral, 07/07/2020);  Coronary angioplasty with stent (10/07/2019); transesophageal echocardiogram (01/26/2020); vascular surgery (Left, 08/04/2020); vascular surgery (Left, 08/05/2020); and vascular surgery (Left, 09/01/2020). Social History:   reports that she has been smoking cigarettes. She has a 30.00 pack-year smoking history. She has never used smokeless tobacco. She reports that she does not drink alcohol and does not use drugs. Family History:   Family History   Problem Relation Age of Onset    Heart Disease Maternal Grandmother     Cancer Mother         lung and brain cancer    Cancer Maternal Aunt         lung and brain cancer       Home Medications:  Prior to Admission medications    Medication Sig Start Date End Date Taking?  Authorizing Provider   predniSONE (DELTASONE) 10 MG tablet 40 mg po x 2 days then 20 mg po x 2 days then 10 mg po x 2 days total of 6 days 6/7/21 6/17/21 Yes Sasha Israel MD   Blood Glucose Monitoring Suppl (ONE TOUCH ULTRA 2) w/Device KIT USE TO MONITOR BLOOD GLUCOSE LEVELS 5/26/21  Yes TRAY Arrieta CNP   ONE TOUCH ULTRASOFT LANCETS MISC USE TO TEST BLOOD GLUCOSE LEVELS 4 TIMES DAILY 5/26/21  Yes TRAY Arrieta CNP   blood glucose test strips (ASCENSIA AUTODISC VI;ONE TOUCH ULTRA TEST VI) strip USE TO MONITOR BLOOD GLUCOSE LEVELS 4 TIMES DAILY 5/26/21  Yes TRAY Arrieta CNP   insulin aspart (NOVOLOG FLEXPEN) 100 UNIT/ML injection pen Inject 10 Units into the skin 3 times daily (before meals) 5/20/21  Yes TRAY Arrieta CNP   QUEtiapine (SEROQUEL XR) 50 MG extended release tablet Take 1 mg by mouth nightly Not sure of dose   Yes Historical Provider, MD   budesonide-formoterol (SYMBICORT) 160-4.5 MCG/ACT AERO Inhale 2 puffs into the lungs 2 times daily 4/19/21  Yes Augustine Vick MD   torsemide (DEMADEX) 100 MG tablet Take 1 tablet by mouth daily 2/23/21  Yes TRAY Stanford CNP   nitroGLYCERIN (NITROSTAT) 0.4 MG SL tablet Place 1 tablet under the tongue every 5 minutes as needed for Chest pain up to max of 3 total doses.  If no relief after 1 dose, call 911. 2/23/21  Yes TRAY Vega CNP   Insulin Degludec (TRESIBA FLEXTOUCH) 100 UNIT/ML SOPN Inject 30 Units into the skin nightly 2/1/21  Yes TRAY Ryan CNP   Insulin Pen Needle (B-D ULTRAFINE III SHORT PEN) 31G X 8 MM MISC Five shots a day 2/1/21  Yes TRAY Ryan CNP   insulin lispro (HUMALOG KWIKPEN U-200) 200 UNIT/ML SOPN pen Inject 10 Units into the skin 3 times daily (before meals) 2/1/21  Yes TRAY Ryan CNP   metOLazone (ZAROXOLYN) 5 MG tablet Take 1 tablet by mouth three times a week On Mon Weds and Friday 1/15/21  Yes Freddie Sibley MD   carvedilol (COREG) 3.125 MG tablet TAKE 1 TABLET BY MOUTH TWICE A DAY WITH MEALS 1/11/21  Yes TRAY Vega CNP   atorvastatin (LIPITOR) 80 MG tablet TAKE 1 TABLET BY MOUTH EVERY DAY AT NIGHT 1/11/21  Yes TRAY Del Cid CNP   doxepin (SINEQUAN) 25 MG capsule TAKE 1 CAPSULE BY MOUTH EVERY DAY AT NIGHT 12/20/20  Yes Historical Provider, MD   KLOR-CON M20 20 MEQ extended release tablet TAKE 2 TABLETS BY MOUTH 4 TIMES DAILY 1/4/21  Yes TRAY Vega CNP   methocarbamol (ROBAXIN) 500 MG tablet Take 500 mg by mouth 2 times daily   Yes Historical Provider, MD   midodrine (PROAMATINE) 5 MG tablet Take 5 mg by mouth 2 times daily   Yes Historical Provider, MD   spironolactone (ALDACTONE) 50 MG tablet Take 50 mg by mouth daily   Yes Historical Provider, MD   Blood Glucose Monitoring Suppl (FREESTYLE FREEDOM LITE) w/Device KIT Use to test glucose 4 times a day 11/17/20  Yes Balbir Peoples MD   blood glucose test strips (FREESTYLE LITE) strip USE TO CHECK BLOOD GLUCOSE LEVELS FOUR TIMES DAILY 11/17/20  Yes Balbir Peoples MD   tiotropium (SPIRIVA RESPIMAT) 2.5 MCG/ACT AERS inhaler INHALE 2 PUFFS INTO THE LUNGS DAILY 10/13/20  Yes Roldan Tineo MD   benztropine (COGENTIN) 1 MG tablet Take 1 mg by mouth nightly  12/13/19  Yes Historical Provider, MD   INSULIN SYRINGE .5CC/29G 29G X 1/2\" 0.5 ML MISC 1 each by Does not apply route daily 12/3/19  Yes TRAY Bloom CNP   pantoprazole (PROTONIX) 40 MG tablet Take 1 tablet by mouth 2 times daily 10/1/19  Yes Frank Louis MD   clopidogrel (PLAVIX) 75 MG tablet TAKE 1 TABLET BY MOUTH EVERY DAY 9/3/19  Yes TRAY Rodrigez CNP   magnesium oxide (MAG-OX) 400 (240 Mg) MG tablet TAKE 1 TABLET ONCE DAILY 5/1/19  Yes Carissa Henriquez MD   busPIRone (BUSPAR) 30 MG tablet Take 30 mg by mouth 2 times daily  4/2/18  Yes Historical Provider, MD   aspirin EC 81 MG EC tablet Take 1 tablet by mouth daily 1/8/16  Yes Gabby Singleton MD   ARIPiprazole (ABILIFY) 15 MG tablet Take 15 mg by mouth daily    Yes Historical Provider, MD   lamoTRIgine (LAMICTAL) 200 MG tablet Take 200 mg by mouth 2 times daily    Yes Historical Provider, MD   buprenorphine-naloxone (SUBOXONE) 8-2 MG FILM SL film Place 1 Film under the tongue 2 times daily. Patient not taking: Reported on 6/17/2021    Historical Provider, MD        Allergies:  Lisinopril     Physical Examination:    Vitals:    06/17/21 0924   BP: 100/60   Pulse: 83   Temp: 97.9 °F (36.6 °C)   SpO2: 100%   Weight: 245 lb 12.8 oz (111.5 kg)   Height: 5' 4.5\" (1.638 m)      Constitutional and General Appearance: Warm and dry, normal coloration, dyspnea at rest, restless  HEENT:  Normocephalic, atraumatic  Respiratory:  · Normal excursion and expansion without use of accessory muscles  · Resp Auscultation: Clear to auscultation though diminished in bases  Cardiovascular:  · The apical impulses not displaced  · Heart tones are crisp and normal  · JVP 10 cm H2O  · Regular rate and rhythm, normal S1S2, no m/g/r  · Peripheral pulses are symmetrical and full  · There is no clubbing, cyanosis of the extremities.   · 2+ BLE edema up to thighs  · Oozing wound to LLE anterior tibia  · Pedal Pulses: not palpable   Abdomen:  · No masses or tenderness, + ventral hernia, + ARTERIES   LM  Proximal less than 10% stenosis, mid 10 to 20% stenosis, distal 70% stenosis.     Overall similar appearance to prior angiograms         LAD  Ostial 70% stenosis, the LAD is extensively stented in the proximal and mid segments, the stents appear to be widely patent with 20% in-stent restenosis, distal vessel has less than 10% stenosis.  There is retrograde filling into the LIMA to LAD graft that fills the left subclavian artery.     D1 is a small vessel with ostial 90% stenosis.     Overall similar appearance to prior angiograms         LCX  Circumflex is stented throughout the proximal and mid and distal segments into OM 1 which is a large vessel.  Stents are widely patent with 10 to 20% in-stent restenosis.  The stents jailed the AV groove circumflex and at the mid AV groove circumflex, there is a 70 to 80% stenosis.           RCA Dominant, medium vessel, heavily calcified, proximal-mid 60 to 70% stenosis, distal less than 10% stenosis, overall similar angiographically as compared to prior angiograms.      BYPASS GRAFTS   LIMA-LAD  Visualized nonselectively through retrograde filling from the native LAD, it is not visualized with antegrade flow from the left subclavian artery injection it appears to be widely patent with less than 10% [id number]-[id number]-[id number] stenosis.      CONCLUSIONS:   Elevated filling pressures, continue diuresis  CardioMEMS had good correlation with the right heart catheterization numbers.   Severe pulmonary hypertension  Patent LIMA to LAD graft however there appears to be coronary steal phenomenon due to severe left subclavian stenosis  We will consider left subclavian intervention  Will obtain follow-up vascular ultrasound to assess this  We will consult with CT surgery regarding therapeutic options       STRESS MPI 8/22/20:    Summary    Calculated LVEF is not reliable on study which is technically difficult, estimate of 51% is likely an overestimate     Mid-distal Echo: 5/23/18:    Ejection fraction is visually estimated to be 30-35 %. There is akinesis of the apex and inferior walls. Left ventricular size is mildly increased. Moderate mitral regurgitation. Moderate amount plaque is noted in the aorta. The left atrium is mildly dilated. 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 A bubble study was performed and fails to show evidence of shunting. Procedure performed: Transesophageal echocardiogram 5/25/18:    Findings:  Reduced left ventricular function with ejection fraction estimated at about 35% with apical and inferior akinesis    The cardiac valves show moderate mitral regurgitation  There is no evidence for an intracardiac shunt or intracardiac thrombus. Cardiac cath 5/25/18:   Procedure Performed:  1. Coronary angiogram  2. Left heart cath  3. Left ventriculogram  4. Right heart cath   Findings:  1. Patent LAD and CIRC stents              ~mild CAD  2. Reduced LVEF 30% with anterior and apical akinesis  3. Moderate pulmonary hyperension   Conclusion/plan of care:  1. Medical management    Assessment:    1. Coronary artery disease involving native coronary artery of native heart with angina pectoris ([address]    2. S/P CABG (coronary artery bypass graft)    3. Ischemic cardiomyopathy    4. Chronic systolic heart failure ([address]    5. Essential hypertension    6. Mixed hyperlipidemia    7. Hypokalemia    8. Mitral valve insufficiency, unspecified etiology    9. Stage 3b chronic kidney disease ([address]    10. PVD (peripheral vascular disease) with claudication ([address]    11. Type 2 diabetes mellitus with stage 3b chronic kidney disease, with long-term current use of insulin ([address]    12. CLINT (obstructive sleep apnea)          Plan:   1. Continue the diuretics as per nephrology  2. Will work on getting you into the infusion clinic for a dose of IV Lasix  3. Stay in touch with nephrology for further management of diuretics.    4. No change in heart medicines  5. Referral to EP for consideration of defibrillator given persistently low heart function   6. Follow up with me in 3 months     Spoke with Kenzie Jauregui in infusion clinic, she can get patient in now for IV Lasix. Orders completed and sent with patient to infusion clinic.      I appreciate the opportunity of cooperating in the care of this individual.    TRAY Perez - CNP, 6/17/2021, 9:31 AM

## 2021-06-21 PROBLEM — I50.9 HEART FAILURE (HCC): Status: ACTIVE | Noted: 2021-01-01

## 2021-06-21 NOTE — ED NOTES
Bedside report given to [facility]. Called Sudeep Michael at [address] [place] [address] for report. Pt has SL x2, on RA, on monitor. . No s/s of distress. Pt alert and sitting up in stretcher. Clothes and cell phone with patient. VSS. Pt being transferred to [facility] at this time.       Tyron Neri RN  06/21/21 [id number]

## 2021-06-21 NOTE — ED PROVIDER NOTES
[address] [facility]      Pt Name: Celio Sanders  MRN: [id number]  [place] 1965  Date of evaluation: 6/21/2021  Provider: Jason Pimentel MD    CHIEF COMPLAINT       Chief Complaint   Patient presents with    Shortness of Breath     pt states she has CHF and saw cardio last week and had doppler completed on 6/18/21 and was sent for IV lasix. states she has gained 30 lbs over the last month and is having SOB today          HISTORY OF PRESENT ILLNESS   (Location/Symptom, Timing/Onset, Context/Setting, Quality, Duration, Modifying Factors, Severity)  Note limiting factors. Celio Sanders is a 60 y.o. female with past medical history of hypertension, hyperlipidemia, coronary artery disease status post CABG with CHF and peripheral artery disease, chronic kidney disease diabetes here today for shortness of breath and increased weight gain    The patient states over the last month she has gained over 30 pounds. She had progressive worsening shortness of breath and dyspnea on exertion but denies any chest pain. No significant cough. No fevers or chills. Denies abdominal pain, nausea or vomiting. She has been following up with her cardiology group as an outpatient and has been receiving outpatient Lasix injections but still feels short of breath. She has been compliant with her home diuretics as well without significant diuresis    HPI    Nursing Notes were reviewed. REVIEW OF SYSTEMS    (2-9 systems for level 4, 10 or more for level 5)     Review of Systems    Please see HPI for pertinent positive and negative review of system findings. A full 10 system ROS was performed and otherwise negative.         PAST MEDICAL HISTORY     Past Medical History:   Diagnosis Date    Acute blood loss anemia 8/5/2020    Acute kidney injury ([address] 12/25/2019    Acute kidney injury superimposed on CKD ([address] 8/5/2020    Acute on chronic combined systolic and diastolic congestive CardioMEMs insertion for CHF    ROTATOR CUFF REPAIR Left 04/22/2016    TONSILLECTOMY      TRANSESOPHAGEAL ECHOCARDIOGRAM  01/26/2020    TRANSLUMINAL ANGIOPLASTY Left 10/08/2019    with stenting, at [facility]    TRANSLUMINAL ANGIOPLASTY Left 04/29/2020    of the [facility] re-occlusion    TUMOR EXCISION      benign tumors X 2 chest & axilla    UPPER GASTROINTESTINAL ENDOSCOPY  4/25/2013    BX barretts, HH, gastritis    UPPER GASTROINTESTINAL ENDOSCOPY  12/10/2015    UPPER GASTROINTESTINAL ENDOSCOPY  01/06/2017    Gastritis    VASCULAR SURGERY Left 12/08/2015    upper extremity and mesenteric angiogram (diagnostic only)    VASCULAR SURGERY Bilateral 07/07/2020    diagnostic lower extremity angiogram only    VASCULAR SURGERY Left 08/04/2020    angioplasty and stent of renal artery    VASCULAR SURGERY Left 08/05/2020    coil embolization of branch renal artery vessel for bleeding    VASCULAR SURGERY Left 09/01/2020    angioplasty and stenting of subclavian artery         CURRENT MEDICATIONS       Previous Medications    ARIPIPRAZOLE (ABILIFY) 15 MG TABLET    Take 15 mg by mouth daily     ASPIRIN EC 81 MG EC TABLET    Take 1 tablet by mouth daily    ATORVASTATIN (LIPITOR) 80 MG TABLET    TAKE 1 TABLET BY MOUTH EVERY DAY AT NIGHT    BENZTROPINE (COGENTIN) 1 MG TABLET    Take 1 mg by mouth nightly     BLOOD GLUCOSE MONITORING SUPPL (ONE TOUCH ULTRA 2) W/DEVICE KIT    USE TO MONITOR BLOOD GLUCOSE LEVELS    BLOOD GLUCOSE TEST STRIPS (ASCENSIA AUTODISC VI;ONE TOUCH ULTRA TEST VI) STRIP    USE TO MONITOR BLOOD GLUCOSE LEVELS 4 TIMES DAILY    BLOOD GLUCOSE TEST STRIPS (FREESTYLE LITE) STRIP    USE TO CHECK BLOOD GLUCOSE LEVELS FOUR TIMES DAILY    BUDESONIDE-FORMOTEROL (SYMBICORT) 160-4.5 MCG/ACT AERO    Inhale 2 puffs into the lungs 2 times daily    BUSPIRONE (BUSPAR) 30 MG TABLET    Take 30 mg by mouth 2 times daily     CARVEDILOL (COREG) 3.125 MG TABLET    TAKE 1 TABLET BY MOUTH TWICE A DAY WITH MEALS    CLOPIDOGREL (PLAVIX) 75 MG TABLET    TAKE 1 TABLET BY MOUTH EVERY DAY    DOXEPIN (SINEQUAN) 25 MG CAPSULE    TAKE 1 CAPSULE BY MOUTH EVERY DAY AT NIGHT    INSULIN ASPART (NOVOLOG FLEXPEN) 100 UNIT/ML INJECTION PEN    Inject 10 Units into the skin 3 times daily (before meals)    INSULIN DEGLUDEC (TRESIBA FLEXTOUCH) 100 UNIT/ML SOPN    Inject 30 Units into the skin nightly    INSULIN LISPRO (HUMALOG KWIKPEN U-200) 200 UNIT/ML SOPN PEN    Inject 10 Units into the skin 3 times daily (before meals)    INSULIN PEN NEEDLE (B-D ULTRAFINE III SHORT PEN) 31G X 8 MM MISC    Five shots a day    INSULIN SYRINGE .5CC/29G 29G X 1/2\" 0.5 ML MISC    1 each by Does not apply route daily    KLOR-CON M20 20 MEQ EXTENDED RELEASE TABLET    TAKE 2 TABLETS BY MOUTH 4 TIMES DAILY    LAMOTRIGINE (LAMICTAL) 200 MG TABLET    Take 200 mg by mouth 2 times daily     MAGNESIUM OXIDE (MAG-OX) 400 (240 MG) MG TABLET    TAKE 1 TABLET ONCE DAILY    METHOCARBAMOL (ROBAXIN) 500 MG TABLET    Take 500 mg by mouth 2 times daily    METOLAZONE (ZAROXOLYN) 5 MG TABLET    Take 1 tablet by mouth three times a week On Mon Weds and Friday    MIDODRINE (PROAMATINE) 5 MG TABLET    Take 5 mg by mouth 2 times daily    NITROGLYCERIN (NITROSTAT) 0.4 MG SL TABLET    Place 1 tablet under the tongue every 5 minutes as needed for Chest pain up to max of 3 total doses. If no relief after 1 dose, call 911.     ONE TOUCH ULTRASOFT LANCETS MISC    USE TO TEST BLOOD GLUCOSE LEVELS 4 TIMES DAILY    PANTOPRAZOLE (PROTONIX) 40 MG TABLET    Take 1 tablet by mouth 2 times daily    QUETIAPINE (SEROQUEL XR) 50 MG EXTENDED RELEASE TABLET    Take 1 mg by mouth nightly Not sure of dose    SPIRONOLACTONE (ALDACTONE) 50 MG TABLET    Take 50 mg by mouth daily    TIOTROPIUM (SPIRIVA RESPIMAT) 2.5 MCG/ACT AERS INHALER    INHALE 2 PUFFS INTO THE LUNGS DAILY    TORSEMIDE (DEMADEX) 100 MG TABLET    Take 1 tablet by mouth daily       ALLERGIES     Lisinopril    FAMILY HISTORY       Family History   Problem Relation Age of Onset    Heart Disease Maternal Grandmother     Cancer Mother         lung and brain cancer    Cancer Maternal Aunt         lung and brain cancer          SOCIAL HISTORY       Social History     Socioeconomic History    Marital status: Single     Spouse name: None    Number of children: None    Years of education: None    Highest education level: None   Occupational History    None   Tobacco Use    Smoking status: Current Every Day Smoker     Packs/day: 1.00     Years: 30.00     Pack years: 30.00     Types: Cigarettes    Smokeless tobacco: Never Used   Vaping Use    Vaping Use: Never used   Substance and Sexual Activity    Alcohol use: No     Comment: quit 2006     Drug use: Never    Sexual activity: Yes     Partners: Male   Other Topics Concern    None   Social History Narrative    None     Social Determinants of Health     Financial Resource Strain:     Difficulty of Paying Living Expenses:    Food Insecurity:     Worried About Running Out of Food in the Last Year:     Ran Out of Food in the Last Year:    Transportation Needs:     Lack of Transportation (Medical):      Lack of Transportation (Non-Medical):    Physical Activity:     Days of Exercise per Week:     Minutes of Exercise per Session:    Stress:     Feeling of Stress :    Social Connections:     Frequency of Communication with Friends and Family:     Frequency of Social Gatherings with Friends and Family:     Attends Jewish Services:     Active Member of Clubs or Organizations:     Attends Club or Organization Meetings:     Marital Status:    Intimate Partner Violence:     Fear of Current or Ex-Partner:     Emotionally Abused:     Physically Abused:     Sexually Abused:        SCREENINGS               PHYSICAL EXAM    (up to 7 for level 4, 8 or more for level 5)     ED Triage Vitals [06/21/21 1033]   BP Temp Temp Source Pulse Resp SpO2 Height Weight   124/77 98.2 °F (36.8 °C) Oral 106 18 96 % 5' 4\" (1.626 m) 245 lb (111.1 kg)       Physical Exam    General appearance:  Cooperative. No acute distress. Skin:  Warm. Dry. Some chronic appearing open ulcerative wound of the bilateral lower legs with mild amount of erythema  Eye:  Extraocular movements intact. Ears, nose, mouth and throat:  Oral mucosa moist,  Neck:  Trachea midline. Heart: Tachycardic and regular  Perfusion:  intact  Respiratory: Speaking in full sentences. Slight increased work of breathing. Abdominal:   Non distended. Nontender  Neurological:  Alert and oriented x 3. Moves all extremities spontaneously  Musculoskeletal:   Normal ROM, no deformities. Bilateral lower extremity 2-3+ pitting edema slight erythema in the bilateral lower legs. Psychiatric:  Normal mood      DIAGNOSTIC RESULTS       Labs Reviewed   CBC WITH AUTO DIFFERENTIAL - Abnormal; Notable for the following components:       Result Value    WBC 11.5 (*)     RDW 15.9 (*)     Neutrophils Absolute 10.1 (*)     Lymphocytes Absolute 0.7 (*)     All other components within normal limits    Narrative:     Performed at:  [facility]. [facility]  [address] [place] [address]. [place], [address]   Phone [phone]   COMPREHENSIVE METABOLIC PANEL W/ REFLEX TO MG FOR LOW K - Abnormal; Notable for the following components:    Sodium 134 (*)     Potassium reflex Magnesium 2.8 (*)     Chloride 88 (*)     CO2 34 (*)     BUN 77 (*)     CREATININE 2.0 (*)     GFR Non- 26 (*)     GFR African American 31 (*)     Alkaline Phosphatase 166 (*)     AST 14 (*)     All other components within normal limits    Narrative:     [facility] [phone],  Chemistry results called to and read back by Eladio/Meredith Kulkarni, 06/21/2021  11:35, by SHELLI  Performed at:  [facility]. [facility]  [address] [place] [address].  [employer], [address]   Phone [phone]   TROPONIN - Abnormal; Notable for the following components: Troponin 0.03 (*)     All other components within normal limits    Narrative:     Harpal Campbell. [id number],  Chemistry results called to and read back by Ten Kulkarni, 06/21/2021  11:35, by Dea Fairchild  Performed at:  [facility]. [facility]  [address] [place] [address]. [place] [address]   Phone [phone] - Abnormal; Notable for the following components:    Pro-BNP 11,221 (*)     All other components within normal limits    Narrative:     Harpal Hammadeline. [phone],  Chemistry results called to and read back by Ten Kulkarni, 06/21/2021  11:35, by ROLANDO  Performed at:  [facility]. [facility]  [address] [place] [address]. [place] [address]   Phone [phone]   BLOOD GAS, VENOUS - Abnormal; Notable for the following components:    pCO2, Christopher 50.2 (*)     HCO3, Venous 32.7 (*)     Base Excess, Christopher 7.1 (*)     Carboxyhemoglobin 4.0 (*)     All other components within normal limits    Narrative:     Performed at:  [facility]. [facility]  [address] [place] [address]. [place] [address] [address] [employer]   Phone [phone]   URINE RT REFLEX TO CULTURE    Narrative:     Performed at:  [facility]. [facility]  [address] [place] [address]. [place] [address]   Phone [phone]   LACTATE, SEPSIS    Narrative:     Performed at:  [facility]. [facility]  [address] [place] [address]. [place], [facility] [address]   Phone [phone]   MAGNESIUM    Narrative:     Stoney Dhaliwal  [facility] tel. [phone],  Chemistry results called to and read back by Gay Alexander, 06/21/2021  11:35, by Dea Fairchild  Performed at:  [facility]. [facility]  [address] [place] [address].  [place] [facility] [address] [employer]   Phone [phone]   LACTATE, SEPSIS       Interpretation per the Radiologist below, if obtained/available at the time of this note:    XR CHEST PORTABLE   Final Result   No radiographic evidence of acute pulmonary disease. All other labs/imaging were within normal range or not returned as of this dictation. EMERGENCY DEPARTMENT COURSE and DIFFERENTIAL DIAGNOSIS/MDM:   Vitals:    Vitals:    06/21/21 1033 06/21/21 1100 06/21/21 1211 06/21/21 1315   BP: 124/77 118/81 100/82 125/73   Pulse: 106 109 110 112   Resp: 18 18 28 28   Temp: 98.2 °F (36.8 °C)      TempSrc: Oral      SpO2: 96% 94% 98% 97%   Weight: 245 lb (111.1 kg)      Height: 5' 4\" (1.626 m)          EKG: Wide QRS likely sinus rhythm at 108 bpm.  Left axis deviation with nonspecific interventricular block. Inferior Q waves. Anterior lateral Q waves. No ST elevation. When compared to prior EKG from 4/21/2021 overall similar in appearance    Patient presents emergency department a day complaining of shortness of breath dyspnea on exertion with 30 pound weight gain over the last month. Has been receiving outpatient Lasix injections through the infusion center but continues to be symptomatic. Here today, the patient was found to have an EKG consistent with prior. Troponin elevated at 0.03 which is more or less consistent with her baseline compared to multiple prior. BNP markedly elevated at 11,000. Chest x-ray clear. I did wish to administer diuretics to the patient but she was found to have a potassium of only 2.8. At this time, do not feel it is safe to administer diuretics given her hypokalemia. She was given oral and IV potassium repletion and may be started on diuretics once admitted to the hospital.  She will be [facility] to [facility] for [facility] care. Addendum: I was called to the patient's bedside by nursing for altered mental status. Went to evaluate the patient and she was somnolent difficult to arouse had some increased work of breathing. Her vital signs were notable for slight tachycardia but otherwise no hypoxia.   Repeat EKG, troponin, VBG were obtained and were within normal limits but a repeat blood glucose showed a sugar of 23. She was given an amp of D50 and has had resolution of symptoms and is now awake alert and oriented at her baseline    MDM    CONSULTS     IP CONSULT TO HOSPITALIST    Critical Care:   None    REASSESSMENT          PROCEDURE     Unless otherwise noted below, none     Procedures      FINAL IMPRESSION      1. Acute on chronic congestive heart failure, unspecified heart failure type ([address]    2. Hypokalemia    3. Hypervolemia, unspecified hypervolemia type            DISPOSITION/PLAN   DISPOSITION Decision To Transfer 06/21/2021 12:39:58 PM        PATIENT REFERRED TO:  No follow-up provider specified. DISCHARGE MEDICATIONS:  New Prescriptions    No medications on file     Controlled Substances Monitoring:     RX Monitoring 4/30/2020   Chronic Pain > 50 MEDD Re-evaluated the status of the patient's underlying condition causing pain.        (Please note that portions of this note were completed with a voice recognition program.  Efforts were made to edit the dictations but occasionally words are mis-transcribed.)    Jacquie Chiang MD (electronically signed)  Attending Emergency Physician            Daniela De Leon MD  06/21/21 [facility] MD Renee  06/21/21 4467

## 2021-06-22 NOTE — PROGRESS NOTES
Physician Progress Note      Kourtney Garvey  CSN #:                  [id number]  :                       1965  ADMIT DATE:       2021 8:41 PM  [address] [place] DATE:  RESPONDING  PROVIDER #:        iLnda Chavarria MD          QUERY TEXT:    Patient admitted with BMI 42.74. If possible, please document in progress   notes and discharge summary if you are evaluating and /or treating any of the   following: The medical record reflects the following:  Risk Factors: PMH Morbid obesity  Clinical Indicators: BMI 42.74  Treatment: I&Os, wts  Options provided:  -- Obesity  -- Morbid obesity  -- Overweight  -- BMI not clinically significant  -- Other - I will add my own diagnosis  -- Disagree - Not applicable / Not valid  -- Disagree - Clinically unable to determine / Unknown  -- Refer to Clinical Documentation Reviewer    PROVIDER RESPONSE TEXT:    This patient has obesity.     Query created by: Romana Zamora on 2021 11:21 AM      Electronically signed by:  Linda Chavarria MD 2021 12:11 PM

## 2021-06-22 NOTE — CONSULTS
VascularSurgery   Resident Consult Note      Chief Complaint: Abnormal arterial duplex    History obtained from: Patient    History of Present Illness :   Una Solomon is a 60 y.o. female who was admitted for fluid overload secondary to decompensated CHF with past medical hx of CHF (EF25%), DM last A1C 12, COPD, CKD stage III. The vascular surgery team was consulted by podiatry for assessment of abnormal arterial duplex given non healing lower extremity wounds. The patient has an extensive past vascular surgery history with R fem pop bypass in 5/2019, axillary brachial bypass in 9/20, and 2 angiograms with stent placement in 10/19 and 4/20. The patient recently got an arterial duplex on 6/18 which demonstrated L SFA stent occlusion. The patient states her leg pain is stable and wounds have been improving. She admits to continued claudication of her left leg along with rest pain which she states she has had for months. The wounds on her legs are stable per her report. She has been wrapping them at home herself. She denies returning to wound care or having home health as she states she \"doesn't like those people\". She admits to neuropathy to bilateral feet which is unchanged.        Past Medical History:        Diagnosis Date    Acute blood loss anemia 8/5/2020    Acute kidney injury ([address] 12/25/2019    Acute kidney injury superimposed on CKD ([address] 8/5/2020    Acute on chronic combined systolic and diastolic congestive heart failure ([address] 1/10/2020    Acute on chronic right-sided heart failure ([address] 08/2020    Alcohol dependence ([address] 3/10/2010    Arthritis     Asthma     CAD (coronary artery disease)     Cellulitis 6/3/2020    COPD (chronic obstructive pulmonary disease) (HCC)     Diabetic ulcer of left foot associated with type 2 diabetes mellitus, limited to breakdown of skin ([address] 1/18/2020    Diabetic ulcer of toe of right foot associated with type 2 diabetes mellitus ([address] 1/18/2020    Left renal artery stenosis ([facility] 08/2020    MI (myocardial infarction) ([facility] 10/07/2019    NSTEMI    Positive FIT (fecal immunochemical test) 2/28/2020    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 steal syndrome 09/01/2020    Traumatic perinephric hematoma, left, initial encounter 8/4/2020       Past Surgical History:        Procedure Laterality Date    ARTERIAL BYPASS SURGRY Left 01/06/2016    Axillary/Subclavian to Brachial Bypass w/reversed SVG    CARDIAC CATHETERIZATION  03/27/2018    Dr. Colon Guardian - at [address]  01/20/2020    Dr. Kylie Moses      pancreatitis post surgery    CORONARY ANGIOPLASTY WITH STENT PLACEMENT  03/16/2018    MELODY- 3.5 x 38 and 3.5 x 20 to Cx    CORONARY ANGIOPLASTY WITH STENT PLACEMENT  01/03/2018    MELODY- 3.0 x 38 and 2.75 x 26 and 2.75 x 8 and 2.75 x 22 to the LAD    CORONARY ANGIOPLASTY WITH STENT PLACEMENT  10/07/2019    MELODY- 2.5 x 8 to [address] GRAFT N/A 1/27/2020    CORONARY ARTERY BYPASS GRAFTING X 1, ON PUMP BEATING HEART, INTERNAL MAMMARY ARTERY TO LEFT ANTERIOR DESCENDING ARTERY, VAS CATH INSERTION, URI, PLATELET GEL APPLICATION, 5 LEVEL BILATERAL INTERCOSTAL NERVE BLOCK, STERNAL PLATING performed by Renan Brown MD at [address] Right 5/16/2019    fem-pop, performed by Noah Vasquez MD at [address]  02/14/2019    CardioMEMs insertion for CHF    ROTATOR CUFF REPAIR Left 04/22/2016    TONSILLECTOMY      TRANSESOPHAGEAL ECHOCARDIOGRAM  01/26/2020    TRANSLUMINAL ANGIOPLASTY Left 10/08/2019    with stenting, at [facility]    [facility] Left 04/29/2020    of the SFA re-occlusion    TUMOR EXCISION      benign tumors X 2 chest & axilla    UPPER GASTROINTESTINAL ENDOSCOPY  4/25/2013    BX barretts, HH, gastritis    UPPER GASTROINTESTINAL ENDOSCOPY  12/10/2015    UPPER GASTROINTESTINAL ENDOSCOPY  01/06/2017    Gastritis    VASCULAR SURGERY Left 12/08/2015    upper extremity and mesenteric angiogram (diagnostic only)    VASCULAR SURGERY Bilateral 07/07/2020    diagnostic lower extremity angiogram only    VASCULAR SURGERY Left 08/04/2020    angioplasty and stent of renal artery    VASCULAR SURGERY Left 08/05/2020    coil embolization of branch renal artery vessel for bleeding    VASCULAR SURGERY Left 09/01/2020    angioplasty and stenting of subclavian artery       Allergies:   Lisinopril    Medications:   Home Meds  No current facility-administered medications on file prior to encounter. Current Outpatient Medications on File Prior to Encounter   Medication Sig Dispense Refill    QUEtiapine (SEROQUEL) 50 MG tablet Take 100 mg by mouth nightly      gabapentin (NEURONTIN) 300 MG capsule Take 300 mg by mouth 3 times daily.  insulin aspart (NOVOLOG FLEXPEN) 100 UNIT/ML injection pen Inject 10 Units into the skin 3 times daily (before meals) 5 pen 3    budesonide-formoterol (SYMBICORT) 160-4.5 MCG/ACT AERO Inhale 2 puffs into the lungs 2 times daily 1 Inhaler 5    torsemide (DEMADEX) 100 MG tablet Take 1 tablet by mouth daily 90 tablet 1    nitroGLYCERIN (NITROSTAT) 0.4 MG SL tablet Place 1 tablet under the tongue every 5 minutes as needed for Chest pain up to max of 3 total doses.  If no relief after 1 dose, call 911. 25 tablet 0    Insulin Degludec (TRESIBA FLEXTOUCH) 100 UNIT/ML SOPN Inject 30 Units into the skin nightly 10 pen 5    metOLazone (ZAROXOLYN) 5 MG tablet Take 1 tablet by mouth three times a week On Mon Weds and Friday 1 tablet 0    carvedilol (COREG) 3.125 MG tablet TAKE 1 TABLET BY MOUTH TWICE A DAY WITH MEALS 180 tablet 3    atorvastatin (LIPITOR) 80 MG tablet TAKE 1 TABLET BY MOUTH EVERY DAY AT NIGHT 90 tablet 3    KLOR-CON M20 20 MEQ extended release tablet TAKE 2 TABLETS BY MOUTH 4 TIMES DAILY 720 tablet 1    midodrine (PROAMATINE) 5 MG tablet Take 5 mg by mouth 2 times daily      spironolactone (ALDACTONE) 50 MG tablet Take 50 mg by mouth daily      tiotropium (SPIRIVA RESPIMAT) 2.5 MCG/ACT AERS inhaler INHALE 2 PUFFS INTO THE LUNGS DAILY 1 Inhaler 6    benztropine (COGENTIN) 1 MG tablet Take 1 mg by mouth nightly       pantoprazole (PROTONIX) 40 MG tablet Take 1 tablet by mouth 2 times daily 60 tablet 0    clopidogrel (PLAVIX) 75 MG tablet TAKE 1 TABLET BY MOUTH EVERY DAY 30 tablet 5    magnesium oxide (MAG-OX) 400 (240 Mg) MG tablet TAKE 1 TABLET ONCE DAILY 30 tablet 11    busPIRone (BUSPAR) 30 MG tablet Take 30 mg by mouth 2 times daily       aspirin EC 81 MG EC tablet Take 1 tablet by mouth daily 30 tablet 3    ARIPiprazole (ABILIFY) 15 MG tablet Take 15 mg by mouth daily       lamoTRIgine (LAMICTAL) 200 MG tablet Take 200 mg by mouth 2 times daily       Blood Glucose Monitoring Suppl (ONE TOUCH ULTRA 2) w/Device KIT USE TO MONITOR BLOOD GLUCOSE LEVELS 1 kit 0    ONE TOUCH ULTRASOFT LANCETS MISC USE TO TEST BLOOD GLUCOSE LEVELS 4 TIMES DAILY 150 each 3    blood glucose test strips (ASCENSIA AUTODISC VI;ONE TOUCH ULTRA TEST VI) strip USE TO MONITOR BLOOD GLUCOSE LEVELS 4 TIMES DAILY 150 each 3    Insulin Pen Needle (B-D ULTRAFINE III SHORT PEN) 31G X 8 MM MISC Five shots a day 200 each 5    blood glucose test strips (FREESTYLE LITE) strip USE TO CHECK BLOOD GLUCOSE LEVELS FOUR TIMES DAILY 200 strip 10    INSULIN SYRINGE .5CC/29G 29G X 1/2\" 0.5 ML MISC 1 each by Does not apply route daily 100 each 3       Current Meds  budesonide (PULMICORT) nebulizer suspension 500 mcg, BID   And  Arformoterol Tartrate (BROVANA) nebulizer solution 15 mcg, BID  heparin (porcine) injection 5,000 Units, 3 times per day  metoprolol tartrate (LOPRESSOR) tablet 25 mg, BID  spironolactone (ALDACTONE) tablet 50 mg, Daily  ARIPiprazole (ABILIFY) tablet 15 mg, Daily  benztropine (COGENTIN) tablet 1 mg, Nightly  busPIRone (BUSPAR) tablet 30 mg, BID  doxepin (SINEQUAN) capsule 25 mg, Nightly  lamoTRIgine (LAMICTAL) Fem Pop PT DP   Right + + -/+ -/+   Left + + -/+ -/+     6/18 arterial duplex   Right ZACH: 0.9  Left ZACH:  0.6    Labs:    CBC:   Recent Labs     06/21/21  1030 06/22/21  0448   WBC 11.5* 9.9   HGB 12.9 12.9   HCT 39.5 38.7   MCV 87.4 87.8    235     BMP:   Recent Labs     06/21/21  2231 06/22/21  0448 06/22/21  1232   * 130* 127*   K 2.9* 2.9* 3.9   CL 85* 83* 85*   CO2 33* 33* 29   BUN 69* 68* 65*   CREATININE 1.8* 1.7* 1.7*     PT/INR: No results for input(s): PROTIME, INR in the last 72 hours. APTT: No results for input(s): APTT in the last 72 hours. Liver Profile:   Lab Results   Component Value Date    AST 14 06/21/2021    ALT 14 06/21/2021    BILIDIR 0.7 01/12/2021    BILITOT 0.6 06/21/2021    ALKPHOS 166 06/21/2021     10/14/2020     Lab Results   Component Value Date    CHOL 134 01/12/2021    HDL 39 01/12/2021    TRIG 123 01/12/2021     UA:   Lab Results   Component Value Date    COLORU Yellow 06/21/2021    PHUR 6.0 06/22/2021    WBCUA 0-2 11/30/2020    RBCUA None seen 11/30/2020    BACTERIA 2+ 11/30/2020    CLARITYU Clear 06/21/2021    SPECGRAV 1.010 06/21/2021    LEUKOCYTESUR Negative 06/21/2021    UROBILINOGEN 0.2 06/21/2021    BILIRUBINUR Negative 06/21/2021    BLOODU Negative 06/21/2021    GLUCOSEU Negative 06/21/2021    AMORPHOUS Rare 08/04/2020       Assessment/Plan: This is a 60 y.o. female who was admitted for fluid overload secondary to decompensated CHF with past medical hx of CHF (EF25%), DM last A1C 12, COPD, CKD stage III. The vascular surgery team was consulted by podiatry for assessment of abnormal arterial duplex given non healing lower extremity wounds. The patient underwent duplex on 6/18 with L SFA stent occlusion and patent R fem pop graft. The patient has largely unchanged if not improved wounds to bilateral lower extremities. She states her claudication and rest pain have been present for weeks now and there is no sign of acute ischemic changes.  DP and PT signals are present bilaterally. Patient currently has appointment with Dr. Surendra Plasencia on 6/25.    - No acute surgical intervention warranted at this time  - Patient can follow up with Dr. Surendra Plasencia on outpatient basis for further discussion of intervention given wounds and arterial duplex findings  - Wound care per podiatry    Patient seen and examined with senior resident and discussed with Dr. Carina Smallwood.       Jaclyn Weaver DO  06/22/21  3:48 PM

## 2021-06-22 NOTE — CONSULTS
NUTRITION ASSESSMENT  Admission Date: 6/21/2021     Type and Reason for Visit: Initial, Positive Nutrition Screen    NUTRITION RECOMMENDATIONS:   1. PO Diet: Continue regular; low fat/low chol/ high fiber/ no salt added diet  2. ONS: Start protein modular BID  3. Nutrition Education: RD to provide carb counting for diabetes and/or HF guidelines ed at follow-up      NUTRITION ASSESSMENT:  Nutritional summary & status: Pt +screen for wounds. Noted pt with leg ulcers. RD briefly discussed good blood sugar control and increased protein needs for wound healing. Pt reports that she follows a \"diabetic diet\" at home by watching sodium intake and limiting sugars. RD offered diet for diabetes and/or heart failure education upon next visit and pt agreeable. Pt reports great po intakes and appetite. Pt reports no known nutrition concerns at this time. Pt agreeable to taking protein modular supplements to increase protein intakes. RD to monitor per [facility] and follow up to provide diet education.       Patient admitted d/t: SOB + weight gain    PMH significant for: CAD, CABG x1, ischemic cardiomyopathy, CHF s/p CardioMEMS implant 2/2019, COPD, PAD, CKD III    MALNUTRITION ASSESSMENT  Context of Malnutrition: Acute Illness   Malnutrition Status: No malnutrition  Findings of the 6 clinical characteristics of malnutrition (Minimum of 2 out of 6 clinical characteristics is required to make the diagnosis of moderate or severe Protein Calorie Malnutrition based on AND/ASPEN Guidelines):  Energy Intake: No significant decrease in energy intake    Energy Intake Time: No significant    Weight Loss %: Unable to assess  -d/t fluid shifts  Weight loss Time: Unable to assess     NUTRITION DIAGNOSIS   Problem: Problem #1: Increased nutrient needs   Etiology: Increased demand for nutrient  Signs & Symptoms: Presence of wounds     NUTRITION INTERVENTION  Food and/or Nutrient Delivery: Continue Current Diet, Start Oral Nutrition Supplement Nutrition Education/Counseling: Education needed   Coordination of Nutrition Care: Continue to monitor while inpatient     NUTRITION MONITORING & EVALUATION:  Evaluation:Goals set   Goals: Pt will consistently consume >50% of meals and supplements during admission. Monitoring: Meal Intake , Supplement Intake  or Wound Healing      OBJECTIVE DATA: Significant to nutrition assessment  · Nutrition-Focused Physical Findings: +2 pitting edema BLE, no bm yet noted  · Labs: Reviewed;  Na 127, POC Glu 205  · Meds: Reviewed  · Wounds Pressure Ulcer      ANTHROPOMETRICS  Current Height: 5' 4\" (162.6 cm)  Current Weight: 249 lb (112.9 kg)    Admission weight: 249 lb (112.9 kg)  Ideal Bodyweight 120 lbs   Usual Bodyweight GEORGE - wt fluctuations d/t fluid shifts  Weight Changes GEORGE      BMI BMI (Calculated): 0    Wt Readings from Last 50 Encounters:   06/22/21 249 lb (112.9 kg)   06/21/21 245 lb (111.1 kg)   06/17/21 245 lb (111.1 kg)   06/17/21 245 lb 12.8 oz (111.5 kg)   06/07/21 223 lb (101.2 kg)   04/26/21 212 lb 4.8 oz (96.3 kg)   03/16/21 225 lb (102.1 kg)   02/22/21 202 lb 6.4 oz (91.8 kg)   01/14/21 236 lb 1.6 oz (107.1 kg)   12/22/20 206 lb 9.1 oz (93.7 kg)   12/20/20 212 lb (96.2 kg)   12/03/20 218 lb 3.2 oz (99 kg)   11/30/20 208 lb (94.3 kg)   11/11/20 231 lb 3.2 oz (104.9 kg)   11/05/20 236 lb (107 kg)   10/28/20 229 lb 9.6 oz (104.1 kg)   10/21/20 216 lb (98 kg)   10/14/20 219 lb 12.8 oz (99.7 kg)   10/07/20 225 lb 6.4 oz (102.2 kg)   09/30/20 227 lb (103 kg)   09/09/20 224 lb 8 oz (101.8 kg)   09/02/20 225 lb 6.4 oz (102.2 kg)   08/18/20 250 lb (113.4 kg)   08/12/20 255 lb 1.6 oz (115.7 kg)   08/04/20 245 lb (111.1 kg)   07/30/20 243 lb (110.2 kg)   07/23/20 257 lb 9.6 oz (116.8 kg)   07/17/20 253 lb (114.8 kg)   07/16/20 256 lb (116.1 kg)   07/09/20 247 lb (112 kg)   07/08/20 247 lb (112 kg)   07/07/20 248 lb (112.5 kg)   07/03/20 247 lb 3.2 oz (112.1 kg)   06/26/20 240 lb (108.9 kg)   06/25/20 240 lb (108.9 kg) 06/18/20 234 lb (106.1 kg)   06/07/20 244 lb 8 oz (110.9 kg)   05/26/20 245 lb (111.1 kg)   05/12/20 227 lb (103 kg)       COMPARATIVE STANDARDS  Estimated Total Kcals/Day : 8-15 Current Bodyweight (113 kg) [phone] kcal/day    Estimated Total Protein (g/day) : 1.8-2.0 Ideal Bodyweight  (55 kg)  g/day  Estimated Daily Total Fluid (ml/day): 900-1600 mL per day     Food / Nutrition-Related History  Pre-Admission / Home Diet:  Pre-Admission/Home Diet: Carb Control, Cardiac (noncompliant )   Home Supplements / Herbals:    none noted  Food Restrictions / Cultural Requests:    none noted    Current Nutrition Therapies   ADULT DIET; Regular; Low Fat/Low Chol/High Fiber/CHARANJIT  Adult Oral Nutrition Supplement; Protein Modular     PO Intake: % x1 meal  PO Supplement: None   PO Supplement Intake: None   IVF: n/a     NUTRITION RISK LEVEL: Risk Level:  Moderate     Mya Joshua RD, LD  Foreign:  [phone]  Office:  [phone]

## 2021-06-22 NOTE — PROGRESS NOTES
Patient alert and oriented x 4, VSS as charted, NSR/ST on telemetry. Patient ambulates with standby assist.  Patient given one time dose of percocet for 10/10 back pain. Blood sugars checked Q2H this shift and remain stable. Patient currently in bed with bed alarm on, wheels locked and in lowest position. Discharge pending medical clearance.

## 2021-06-22 NOTE — CONSULTS
Department of Podiatry Consult Note  Resident       Reason for Consult: Diabetic with black toe tips and leg ulcers  Requesting Physician:  Jean-Paul Brown MD    CHIEF COMPLAINT: Leg and foot wounds    HISTORY OF PRESENT ILLNESS:                The patient is a 60 y.o. female with significant past medical history as listed below. Podiatry was consulted for diabetic with black toe tips and leg ulcers. Patient reports she follows with podiatrist over in [facility] area and that she follows with vascular surgeon Dr. Lola Delaney at [facility]. Patient reports she was sent to [facility] because [facility] did not have a bed. Patient reports chronic wounds to legs and feet. Patient reports she has had previous grafts and stents placed to both legs and that she recently had an arterial duplex. Patient reports that her legs stay red due to the swelling. Patient denies purulent drainage, increased redness, pain to bilateral lower extremity. Patient denies fever, chills, nausea, vomiting, shortness of breath, chest pain. Patient has no other pedal complaints at this time.     Past Medical History:        Diagnosis Date    Acute blood loss anemia 8/5/2020    Acute kidney injury ([address] 12/25/2019    Acute kidney injury superimposed on CKD ([address] 8/5/2020    Acute on chronic combined systolic and diastolic congestive heart failure ([address] 1/10/2020    Acute on chronic right-sided heart failure ([address] 08/2020    Alcohol dependence ([address] 3/10/2010    Arthritis     Asthma     CAD (coronary artery disease)     Cellulitis 6/3/2020    COPD (chronic obstructive pulmonary disease) (HCC)     Diabetic ulcer of left foot associated with type 2 diabetes mellitus, limited to breakdown of skin ([address] 1/18/2020    Diabetic ulcer of toe of right foot associated with type 2 diabetes mellitus ([address] 1/18/2020    Left renal artery stenosis ([address] 08/2020    MI (myocardial infarction) ([address] 10/07/2019    NSTEMI    Positive FIT (fecal immunochemical 07/07/2020    diagnostic lower extremity angiogram only    VASCULAR SURGERY Left 08/04/2020    angioplasty and stent of renal artery    VASCULAR SURGERY Left 08/05/2020    coil embolization of branch renal artery vessel for bleeding    VASCULAR SURGERY Left 09/01/2020    angioplasty and stenting of subclavian artery       Allergies:   Lisinopril    Medications:   Home Meds  No current facility-administered medications on file prior to encounter. Current Outpatient Medications on File Prior to Encounter   Medication Sig Dispense Refill    QUEtiapine (SEROQUEL) 50 MG tablet Take 100 mg by mouth nightly      gabapentin (NEURONTIN) 300 MG capsule Take 300 mg by mouth 3 times daily.  insulin aspart (NOVOLOG FLEXPEN) 100 UNIT/ML injection pen Inject 10 Units into the skin 3 times daily (before meals) 5 pen 3    budesonide-formoterol (SYMBICORT) 160-4.5 MCG/ACT AERO Inhale 2 puffs into the lungs 2 times daily 1 Inhaler 5    torsemide (DEMADEX) 100 MG tablet Take 1 tablet by mouth daily 90 tablet 1    nitroGLYCERIN (NITROSTAT) 0.4 MG SL tablet Place 1 tablet under the tongue every 5 minutes as needed for Chest pain up to max of 3 total doses.  If no relief after 1 dose, call 911. 25 tablet 0    Insulin Degludec (TRESIBA FLEXTOUCH) 100 UNIT/ML SOPN Inject 30 Units into the skin nightly 10 pen 5    metOLazone (ZAROXOLYN) 5 MG tablet Take 1 tablet by mouth three times a week On Mon Weds and Friday 1 tablet 0    carvedilol (COREG) 3.125 MG tablet TAKE 1 TABLET BY MOUTH TWICE A DAY WITH MEALS 180 tablet 3    atorvastatin (LIPITOR) 80 MG tablet TAKE 1 TABLET BY MOUTH EVERY DAY AT NIGHT 90 tablet 3    KLOR-CON M20 20 MEQ extended release tablet TAKE 2 TABLETS BY MOUTH 4 TIMES DAILY 720 tablet 1    midodrine (PROAMATINE) 5 MG tablet Take 5 mg by mouth 2 times daily      spironolactone (ALDACTONE) 50 MG tablet Take 50 mg by mouth daily      tiotropium (SPIRIVA RESPIMAT) 2.5 MCG/ACT AERS inhaler INHALE 2 PUFFS Nightly  insulin lispro (1 Unit Dial) 0-12 Units, TID WC  insulin lispro (1 Unit Dial) 0-6 Units, Nightly  furosemide (LASIX) 100 mg in dextrose 5 % 100 mL infusion, Continuous  oxyCODONE-acetaminophen (PERCOCET) 5-325 MG per tablet 1 tablet, Q8H PRN  glucose (GLUTOSE) 40 % oral gel 15 g, PRN  dextrose 50 % IV solution, PRN  glucagon (rDNA) injection 1 mg, PRN  dextrose 5 % solution, PRN  aspirin EC tablet 81 mg, Daily  atorvastatin (LIPITOR) tablet 80 mg, Nightly  clopidogrel (PLAVIX) tablet 75 mg, Daily  pantoprazole (PROTONIX) tablet 40 mg, BID AC        Family History:   Family History   Problem Relation Age of Onset    Heart Disease Maternal Grandmother     Cancer Mother         lung and brain cancer    Cancer Maternal Aunt         lung and brain cancer       Social History:   TOBACCO:   reports that she has been smoking cigarettes. She has a 30.00 pack-year smoking history. She has never used smokeless tobacco.  ETOH:   reports no history of alcohol use. DRUGS:   reports no history of drug use. REVIEW OF SYSTEMS:    As above. PHYSICAL EXAM:      Vitals:    /74   Pulse 112   Temp 98.1 °F (36.7 °C) (Oral)   Resp 18   Ht 5' 4\" (1.626 m)   Wt 249 lb (112.9 kg)   LMP 10/24/2012   SpO2 96%   BMI 42.74 kg/m²     LABS:   Recent Labs     06/21/21  1030 06/22/21  0448   WBC 11.5* 9.9   HGB 12.9 12.9   HCT 39.5 38.7    235     Recent Labs     06/22/21  1232   *   K 3.9   CL 85*   CO2 29   BUN 65*   CREATININE 1.7*     Recent Labs     06/21/21  1030   PROT 7.2         VASCULAR: DP and PT pulses nonpalpable bilateral.  Upon hand-held Doppler examination, DP and PT signals monophasic bilateral.  CFT issluggish to the digits of the foot b/l. Skin temperature is warm to cool from proximal to distal with no focal calor noted. +3 pitting edema noted bilateral lower extremity. No pain with calf compression b/l. NEUROLOGIC: Gross and epicritic sensation is diminished b/l.  Protective sensation is absent at all pedal sites b/l. DERMATOLOGIC: Diffuse dermatologic changes noted to bilateral lower extremity. Dependent rubor noted to bilateral lower extremity. Multiple full-thickness ulcerations noted to bilateral lower extremity 2/2 combination of arterial and venous insufficiency. No purulent drainage, malodor, erythema, fluctuance, or crepitus noted. Wounds do not probe to bone, tunnel, or track. Patient provided verbal consent for today's photos. Right lower extremity: Second digit noted to have dusky appearance. Left lower extremity: Distal aspects of digits 1 through 5 with ischemic changes. MUSCULOSKELETAL: Muscle strength is 5/5 for all pedal groups tested. No pain with palpation of the foot or ankle b/l. Ankle joint ROM is decreased in dorsiflexion with the knee extended. No obvious biomechanical abnormalities. IMAGING:  X-ray tibia-fibula right 6/22/2021  FINDINGS: Diffuse soft tissue swelling. No osseous erosion. No soft tissue gas. Metallic clips consistent with previous vascular surgery.           Impression   1. Diffuse soft tissue swelling without acute osseous abnormality.           X-ray ankle right 6/22/2021  FINDINGS: Soft tissue swelling. No soft tissue gas identified. No periosteal reaction to suggest osteomyelitis.           Impression   1. Soft tissue swelling without significant osseous abnormality otherwise.           X-ray foot right 6/22/2021  FINDINGS: Mineralization appears intact. No acute fracture or dislocation. Moderate calcaneal enthesopathy. First MTP osteoarthritis. Diffuse soft tissue swelling along the distal foot. No convincing evidence of soft tissue gas or osseous erosion.           Impression   1. Soft tissue swelling without acute osseous abnormality. 2. Calcaneal enthesopathy.    3. Polyarticular osteoarthritis.           X-ray tibia-fibula left 6/22/2021  FINDINGS:       No evidence of acute fracture or popliteal artery and calf arteries consistent with <50% stenosis. Left Impression   The left ankle/brachial index is 0.6 (the DP is 60 and the PT is non-audible   mmHg). The common femoral artery demonstrates abnormal monophasic flow indicating   significant aortic-iliac inflow disease. The proximal - distal SFA appears occluded with a non functioning stent. There is abnormal monophasic flow demonstrated in the popliteal artery,   posterior tibial artery and anterior tibial artery. The peroneal artery appears occluded. There is atherosclerotic plaque seen within the common femoral artery, prox -   distal SFA, popliteal artery and calf arteries. Previous exam on 06/26/2020, right ZACH: 0.9 left ZACH: 0.8.       Conclusions        Summary        Normal right ZACH.    Abnormal left ZACH in the range of moderate arterial insufficiency.        Patent right femoropopliteal bypass with distal graft velocity below the    threshold associated with threatened graft patency. No identified graft    abnormality.        Occluded L SFA associated with stent occlusion. IMPRESSION/RECOMMENDATIONS:      -Multiple full-thickness ulcerations, bilateral lower extremity, likely 2/2 combination of arterial and venous insufficiency  -Peripheral arterial disease, bilateral lower extremity  -Venous insufficiency, bilateral lower extremity  -Diabetes mellitus type 2 with peripheral neuropathy, bilateral lower extremity  -Charcot neuroarthropathy versus other source of neuropathic changes, left foot    -Patient examined and evaluated at the bedside   -VSS. No leukocytosis noted.   -CRP 48.8  -ESR ordered  -X-rays reviewed and impression noted above  -Arterial duplex reviewed and impression noted above  -Vascular consult ordered; will follow up recommendations  -Debridement deferred due to peripheral arterial disease  -Dressing applied to left lower extremity consisting of wet-to-dry to wounds, gauze, Kerlix, tape  -Betadine applied to distal digits left foot and wounds right foot and leg  -Antibiotics not indicated at this time from podiatric standpoint  -Nonweightbearing left leg. Weightbearing as tolerated right leg. DISPO: Multiple full-thickness ulcerations, bilateral lower extremity, likely 2/2 combination of arterial and venous insufficiency. No signs of acute infection at this time. Vascular consult ordered. ESR ordered; will follow up results. No surgical intervention planned from podiatric standpoint at this time.     - The patient will be staffed with Dr. Sam Kitchen DPM.    CLAUDIA Richardson [facility]  06/22/21  5:04 PM

## 2021-06-22 NOTE — PROGRESS NOTES
Pt HR sustaining 130-140. Pt asymptomatic, morning dose of carvedilol given. Pt states she is also having 10/10 back pain and is requesting something for pain. Dr. Castillo Session notified.

## 2021-06-22 NOTE — CONSULTS
Nephrology Consult Note  [phone]  [phone]   [email or web address].        Reason for Consult:  Hypokalemia, CKD     HISTORY OF PRESENT ILLNESS:                This is a patient with a significant past medical history of CAD, COPD, CHF, DM, GERD, HTN, HLD and PVD who has had multiple admissions in the hospital because of decompensated HF. Patient has a known history of CKD stage 3 with a baseline serum creatinine of 1.3-1.6mg/dL. Also recurrent SHAUNNA episodes secondary to cardiorenal syndrome. She is known to have had a renal artery stent in the past as well. Last admission, her serum creatinine went up as high as 4.1mg/dL but improved down to 1.6mg/dL on discharge with diuresis. She presents with dyspnea. Her Cr was 2.2 on 6/17 now 1.7 ,she has cardiorenal syndrome, now has AFib and was on lasix gtt but held when K noted to be very low.       Past Medical History:        Diagnosis Date    Acute blood loss anemia 8/5/2020    Acute kidney injury ([address] 12/25/2019    Acute kidney injury superimposed on CKD ([address] 8/5/2020    Acute on chronic combined systolic and diastolic congestive heart failure ([address] 1/10/2020    Acute on chronic right-sided heart failure ([address] 08/2020    Alcohol dependence ([address] 3/10/2010    Arthritis     Asthma     CAD (coronary artery disease)     Cellulitis 6/3/2020    COPD (chronic obstructive pulmonary disease) (HCC)     Diabetic ulcer of left foot associated with type 2 diabetes mellitus, limited to breakdown of skin ([address] 1/18/2020    Diabetic ulcer of toe of right foot associated with type 2 diabetes mellitus ([address] 1/18/2020    Left renal artery stenosis ([address] 08/2020    MI (myocardial infarction) ([address] 10/07/2019    NSTEMI    Positive FIT (fecal immunochemical test) 2/28/2020    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 steal syndrome 09/01/2020    Traumatic perinephric hematoma, left, initial encounter 8/4/2020       Past Surgical History:        Procedure Laterality Date    ARTERIAL BYPASS SURGRY Left 01/06/2016    Axillary/Subclavian to Brachial Bypass w/reversed SVG    CARDIAC CATHETERIZATION  03/27/2018    Dr. Tamar Nunez - at [address] [place]  01/20/2020    Dr. Yung Escobedo      pancreatitis post surgery    CORONARY ANGIOPLASTY WITH STENT PLACEMENT  03/16/2018    MELODY- 3.5 x 38 and 3.5 x 20 to Cx    CORONARY ANGIOPLASTY WITH STENT PLACEMENT  01/03/2018    MELODY- 3.0 x 38 and 2.75 x 26 and 2.75 x 8 and 2.75 x 22 to the LAD    CORONARY ANGIOPLASTY WITH STENT PLACEMENT  10/07/2019    MELODY- 2.5 x 8 to [facility] GRAFT N/A 1/27/2020    CORONARY ARTERY BYPASS GRAFTING X 1, ON PUMP BEATING HEART, INTERNAL MAMMARY ARTERY TO LEFT ANTERIOR DESCENDING ARTERY, VAS CATH INSERTION, URI, PLATELET GEL APPLICATION, 5 LEVEL BILATERAL INTERCOSTAL NERVE BLOCK, STERNAL PLATING performed by Kiara Jimenez MD at [address] [place] [address] Right 5/16/2019    fem-pop, performed by Stewart Beck MD at [address]  02/14/2019    CardioMEMs insertion for CHF    ROTATOR CUFF REPAIR Left 04/22/2016    TONSILLECTOMY      TRANSESOPHAGEAL ECHOCARDIOGRAM  01/26/2020    TRANSLUMINAL ANGIOPLASTY Left 10/08/2019    with stenting, at [facility]   [address] Left 04/29/2020    of the SFA re-occlusion    TUMOR EXCISION      benign tumors X 2 chest & axilla    UPPER GASTROINTESTINAL ENDOSCOPY  4/25/2013    BX barretts, HH, gastritis    UPPER GASTROINTESTINAL ENDOSCOPY  12/10/2015    UPPER GASTROINTESTINAL ENDOSCOPY  01/06/2017    Gastritis    VASCULAR SURGERY Left 12/08/2015    upper extremity and mesenteric angiogram (diagnostic only)    VASCULAR SURGERY Bilateral 07/07/2020    diagnostic lower extremity angiogram only    VASCULAR SURGERY Left 08/04/2020    angioplasty and stent of renal artery    VASCULAR SURGERY Left 08/05/2020    coil embolization of branch renal artery vessel for bleeding    VASCULAR SURGERY Left 09/01/2020    angioplasty and stenting of subclavian artery       Current Medications:    No current facility-administered medications on file prior to encounter. Current Outpatient Medications on File Prior to Encounter   Medication Sig Dispense Refill    insulin aspart (NOVOLOG FLEXPEN) 100 UNIT/ML injection pen Inject 10 Units into the skin 3 times daily (before meals) 5 pen 3    QUEtiapine (SEROQUEL XR) 50 MG extended release tablet Take 1 mg by mouth nightly Not sure of dose      budesonide-formoterol (SYMBICORT) 160-4.5 MCG/ACT AERO Inhale 2 puffs into the lungs 2 times daily 1 Inhaler 5    torsemide (DEMADEX) 100 MG tablet Take 1 tablet by mouth daily 90 tablet 1    nitroGLYCERIN (NITROSTAT) 0.4 MG SL tablet Place 1 tablet under the tongue every 5 minutes as needed for Chest pain up to max of 3 total doses.  If no relief after 1 dose, call 911. 25 tablet 0    Insulin Degludec (TRESIBA FLEXTOUCH) 100 UNIT/ML SOPN Inject 30 Units into the skin nightly 10 pen 5    insulin lispro (HUMALOG KWIKPEN U-200) 200 UNIT/ML SOPN pen Inject 10 Units into the skin 3 times daily (before meals) 2 pen 5    metOLazone (ZAROXOLYN) 5 MG tablet Take 1 tablet by mouth three times a week On Mon Weds and Friday 1 tablet 0    carvedilol (COREG) 3.125 MG tablet TAKE 1 TABLET BY MOUTH TWICE A DAY WITH MEALS 180 tablet 3    atorvastatin (LIPITOR) 80 MG tablet TAKE 1 TABLET BY MOUTH EVERY DAY AT NIGHT 90 tablet 3    doxepin (SINEQUAN) 25 MG capsule TAKE 1 CAPSULE BY MOUTH EVERY DAY AT NIGHT      KLOR-CON M20 20 MEQ extended release tablet TAKE 2 TABLETS BY MOUTH 4 TIMES DAILY 720 tablet 1    methocarbamol (ROBAXIN) 500 MG tablet Take 500 mg by mouth 2 times daily      midodrine (PROAMATINE) 5 MG tablet Take 5 mg by mouth 2 times daily      spironolactone (ALDACTONE) 50 MG tablet Take 50 mg by mouth daily      tiotropium (SPIRIVA RESPIMAT) 2.5 MCG/ACT AERS inhaler INHALE 2 PUFFS INTO THE LUNGS DAILY 1 Inhaler 6    benztropine (COGENTIN) 1 MG tablet Take 1 mg by mouth nightly       pantoprazole (PROTONIX) 40 MG tablet Take 1 tablet by mouth 2 times daily 60 tablet 0    clopidogrel (PLAVIX) 75 MG tablet TAKE 1 TABLET BY MOUTH EVERY DAY 30 tablet 5    magnesium oxide (MAG-OX) 400 (240 Mg) MG tablet TAKE 1 TABLET ONCE DAILY 30 tablet 11    busPIRone (BUSPAR) 30 MG tablet Take 30 mg by mouth 2 times daily       aspirin EC 81 MG EC tablet Take 1 tablet by mouth daily 30 tablet 3    ARIPiprazole (ABILIFY) 15 MG tablet Take 15 mg by mouth daily       lamoTRIgine (LAMICTAL) 200 MG tablet Take 200 mg by mouth 2 times daily       Blood Glucose Monitoring Suppl (ONE TOUCH ULTRA 2) w/Device KIT USE TO MONITOR BLOOD GLUCOSE LEVELS 1 kit 0    ONE TOUCH ULTRASOFT LANCETS MISC USE TO TEST BLOOD GLUCOSE LEVELS 4 TIMES DAILY 150 each 3    blood glucose test strips (ASCENSIA AUTODISC VI;ONE TOUCH ULTRA TEST VI) strip USE TO MONITOR BLOOD GLUCOSE LEVELS 4 TIMES DAILY 150 each 3    Insulin Pen Needle (B-D ULTRAFINE III SHORT PEN) 31G X 8 MM MISC Five shots a day 200 each 5    blood glucose test strips (FREESTYLE LITE) strip USE TO CHECK BLOOD GLUCOSE LEVELS FOUR TIMES DAILY 200 strip 10    INSULIN SYRINGE .5CC/29G 29G X 1/2\" 0.5 ML MISC 1 each by Does not apply route daily 100 each 3       Allergies:  Lisinopril    Social History:    Social History     Socioeconomic History    Marital status: Single     Spouse name: Not on file    Number of children: Not on file    Years of education: Not on file    Highest education level: Not on file   Occupational History    Not on file   Tobacco Use    Smoking status: Current Every Day Smoker     Packs/day: 1.00     Years: 30.00     Pack years: 30.00     Types: Cigarettes    Smokeless tobacco: Never Used   Vaping Use    Vaping Use: Never used   Substance and Sexual Activity    Alcohol use: No     Comment: quit 2006     Drug use: Never    Sexual activity: Yes     Partners: Male   Other Topics Concern    Not on file   Social History Narrative    Not on file     Social Determinants of Health     Financial Resource Strain:     Difficulty of Paying Living Expenses:    Food Insecurity:     Worried About Running Out of Food in the Last Year:     [address] in the Last Year:    Transportation Needs:     Lack of Transportation (Medical):  Lack of Transportation (Non-Medical):    Physical Activity:     Days of Exercise per Week:     Minutes of Exercise per Session:    Stress:     Feeling of Stress :    Social Connections:     Frequency of Communication with Friends and Family:     Frequency of Social Gatherings with Friends and Family:     Attends Je[employer] witness Services:     Active Member of Clubs or Organizations:     Attends Club or Organization Meetings:     Marital Status:    Intimate Partner Violence:     Fear of Current or Ex-Partner:     Emotionally Abused:     Physically Abused:     Sexually Abused:        Family History:       Problem Relation Age of Onset    Heart Disease Maternal Grandmother     Cancer Mother         lung and brain cancer    Cancer Maternal Aunt         lung and brain cancer         Review of Systems:  a comprehensive Review of systems was negative except for the following: some dyspnea but better. PHYSICAL EXAM:    Vitals:    /73   Pulse 140   Temp 98.5 °F (36.9 °C) (Oral)   Resp 20   Ht 5' 4\" (1.626 m)   Wt 249 lb (112.9 kg)   LMP 10/24/2012   SpO2 98%   BMI 42.74 kg/m²   I/O last 3 completed shifts:  In: -   Out: 2200 [[id number]]  I/O this shift:  In: 240 [P.O.:240]  Out: 0     Physical Exam:  Gen: Resting in bed, NAD. HEENT: MMM, OP clear. CV: RRR no m/r/g. No S3.  Lungs: Good respiratory effort, clear air entry   Abd: S/NT +BS  Ext: No edema, no cyanosis  Skin: Warm. No rashes appreciated. : No TTP over bladder, nondistended.   Neuro: Alert and oriented x 3, nonfocal.  Joints: No erythema noted over joints. DATA:    CBC:   Lab Results   Component Value Date    WBC 9.9 06/22/2021    RBC 4.41 06/22/2021    HGB 12.9 06/22/2021    HCT 38.7 06/22/2021    MCV 87.8 06/22/2021    MCH 29.2 06/22/2021    MCHC 33.3 06/22/2021    RDW 15.8 06/22/2021     06/22/2021    MPV 8.8 06/22/2021     BMP:    Lab Results   Component Value Date     06/22/2021    K 2.9 06/22/2021    CL 83 06/22/2021    CO2 33 06/22/2021    BUN 68 06/22/2021    LABALBU 4.0 06/21/2021    CREATININE 1.7 06/22/2021    CALCIUM 9.0 06/22/2021    GFRAA 37 06/22/2021    LABGLOM 31 06/22/2021    GLUCOSE 103 06/22/2021       IMPRESSION/RECOMMENDATIONS:      1. SHAUNNA/CKD in setting of CRS and dependence on diuretics hold lasix today but will need to resume in next day or so   2. Replace K  3. May need to increase Aldactone dose to 50 mg BID   4. Allergic to ACEi   5. Afib per cardiology   6. Hyponatremia this is chronic per old records baseline is 80- 128, may have an element of SIADH by prior work up     Thank you for allowing me to participate in the care of this patient. I will continue to follow along. Please call with questions.     Canelo Mendez MD, MD

## 2021-06-22 NOTE — CARE COORDINATION
Case Management Assessment           Initial Evaluation                Date / Time of Evaluation: 6/22/2021 10:05 AM                 Assessment Completed by: KATIE Tran, TIRSOW    Patient Name: Sybil Lauren     YOB: 1965  Diagnosis: Heart failure [facility]) [I50.9]     Date / Time: 6/21/2021  8:41 PM    Patient Admission Status: Inpatient    If patient is discharged prior to next notation, then this note serves as note for discharge by case management. Current PCP: Elizabeth Berger PA-C  Clinic Patient: No    Chart Reviewed: Yes  Patient/ Family Interviewed: Yes    Initial assessment completed at bedside with: Patient    Hospitalization in the last 30 days: No    Emergency Contacts:  Extended Emergency Contact Information  Primary Emergency Contact: Sudhir Dominguez \"Lanny\"  Home Phone: [phone]  Relation: Child  Preferred language: English   needed? No  Secondary Emergency Contact: [address] [place] [address] Phone: [phone]  Relation: Other    Advance Directives:   Code Status: Prior    Healthcare Power of : No    Financial  Payor: Rivas Perea / Plan: [place] / Product Type: *No Product type* /     Pre-cert required for SNF: Yes    Pharmacy    [facility] [facility], [address] [place] [address]  [address] [facility] [address]  Phone: [phone] Fax: [phone]    [address]  [address] [address] [facility], [place]  Phone: [phone] Fax: [phone]      Potential assistance Purchasing Medications: Potential Assistance Purchasing Medications: Yes  Does Patient want to participate in local refill/ meds to beds program?: Yes    Meds To Beds General Rules:  1. Can ONLY be done Monday- Friday between 8:30am-5pm  2. Prescription(s) must be in pharmacy by 3pm to be filled same day  3. Copy of patient's insurance/ prescription drug card and patient face sheet must be sent along with the prescription(s)  4. Cost of Rx cannot be added to hospital bill. If financial assistance is needed, please contact unit  or ;  or  CANNOT provide pharmacy voucher for patients co-pays  5. Patients can then  the prescription on their way out of the hospital at discharge, or pharmacy can deliver to the bedside if staff is available. (payment due at time of pick-up or delivery - cash, check, or card accepted)     Able to afford home medications/ co-pay costs: Yes    ADLS  Support Systems: Children, Family Members, Friends/Neighbors    PT AM-PAC:   /24  OT AM-PAC:   /24    [place]: Pt lives in a trailer in [place] [facility] w/family   Steps: 5 to get in    Plans to RETURN to current housing: Yes  Barriers to RETURNING to current housing: Not medically ready    [address]  Currently ACTIVE with [address]: No  Home Care Agency: Not Applicable    Currently ACTIVE with [place] on Aging: No    Durable Medical Equipment  DME Provider: None  Equipment: Pt has a walker and cane at home    Home Oxygen and [address] prior to admission: No  [address]: Not Applicable  Other Respiratory Equipment: N/A    Dialysis  Active with HD/PD prior to admission: No  Nephrologist: [address]:  Not Applicable    DISCHARGE PLAN:  Disposition: Home- No Services Needed    Transportation PLAN for discharge: family     Factors facilitating achievement of predicted outcomes: Family support    Barriers to discharge: Not medically ready    Additional Case Management Notes: Pt from home w/family, Pt plans to return home at DC, Pt is currently denying any DC needs, Pt's family will transport.  SW following for possible DC needs    The Plan for Transition of Care is related to the following treatment goals of Heart failure ([address] [I50.9]    The Patient and/or patient representative Bisi Smyth and her family were provided with a choice of provider and agrees with the discharge plan Yes    Freedom of choice list was provided with basic dialogue that supports the patient's individualized plan of care/goals and shares the quality data associated with the providers.  Not Indicated    Care Transition patient: KATIE Kee, [facility]  Case Management Department  Ph: [phone]   Fax: [phone]

## 2021-06-22 NOTE — PROGRESS NOTES
Hospitalist Progress Note      PCP: Sol Sánchez PA-C    Date of Admission: 6/21/2021    Chief Complaint: SOB    Hospital Course: Patient is 47-year-old female with history of CAD, CABG x1, ischemic cardiomyopathy, CHF s/p CardioMEMS implant 2/2019, COPD, PAD, CKD stage III presented to [facility] ER with complaint of shortness of breath and weight gain of 30 pounds. Patient reported that she has been noncompliant with her diet having a lot of salt in food. Patient was noted to be in A. fib with RVR on telemetry. Subjective  Seen and examined today. Still complaining of shortness of breath. Denies nausea, vomiting, fever, chills. She was received on Lasix drip briefly become hypokalemic received supplements.     Medications:  Reviewed    Infusion Medications    furosemide (LASIX) 1mg/ml infusion      dextrose       Scheduled Medications    budesonide  0.5 mg Nebulization BID    And    Arformoterol Tartrate  15 mcg Nebulization BID    heparin (porcine)  5,000 Units Subcutaneous 3 times per day    metoprolol tartrate  25 mg Oral BID    spironolactone  50 mg Oral Daily    ARIPiprazole  15 mg Oral Daily    benztropine  1 mg Oral Nightly    busPIRone  30 mg Oral BID    doxepin  25 mg Oral Nightly    lamoTRIgine  200 mg Oral BID    methocarbamol  500 mg Oral BID    insulin glargine  30 Units Subcutaneous Nightly    insulin lispro  0-12 Units Subcutaneous TID WC    insulin lispro  0-6 Units Subcutaneous Nightly    aspirin EC  81 mg Oral Daily    atorvastatin  80 mg Oral Nightly    clopidogrel  75 mg Oral Daily    pantoprazole  40 mg Oral BID AC     PRN Meds: glucose, dextrose, glucagon (rDNA), dextrose      Intake/Output Summary (Last 24 hours) at 6/22/2021 1425  Last data filed at 6/22/2021 1401  Gross per 24 hour   Intake 600 ml   Output 2625 ml   Net -2025 ml       Physical Exam Performed:    /75   Pulse 114   Temp 97.7 °F (36.5 °C) (Oral)   Resp 18   Ht 5' 4\" (1.626 m)   Wt 249 lb (112.9 kg)   LMP 10/24/2012   SpO2 96%   BMI 42.74 kg/m²     General appearance: in mild resp distress  HEENT: Pupils equal, round, and reactive to light. Conjunctivae/corneas clear. Neck: Supple, with full range of motion. No jugular venous distention. Trachea midline. Respiratory: Bilateral diminished breathing sounds with mild crackles. No wheezes no rhonchi. Cardiovascular: Irregular rhythm with normal E9/A7, grade 2 systolic murmur. Abdomen: Soft, non-tender, non-distended with normal bowel sounds. Musculoskeletal: Bilateral lower extremity 3+ pitting edema  Skin: Chronic venous ulcers on both legs. Neurologic:  Neurovascularly intact without any focal sensory/motor deficits. Cranial nerves: II-XII intact, grossly non-focal.  Psychiatric: Alert and oriented, thought content appropriate, normal insight  Capillary Refill: Brisk,< 3 seconds   Peripheral Pulses: +2 palpable, equal bilaterally       Labs:   Recent Labs     06/21/21  1030 06/22/21  0448   WBC 11.5* 9.9   HGB 12.9 12.9   HCT 39.5 38.7    235     Recent Labs     06/21/21  2231 06/22/21  0448 06/22/21  1232   * 130* 127*   K 2.9* 2.9* 3.9   CL 85* 83* 85*   CO2 33* 33* 29   BUN 69* 68* 65*   CREATININE 1.8* 1.7* 1.7*   CALCIUM 9.1 9.0 9.0     Recent Labs     06/21/21  1030   AST 14*   ALT 14   BILITOT 0.6   ALKPHOS 166*     No results for input(s): INR in the last 72 hours.   Recent Labs     06/21/21  1030 06/21/21  1505   TROPONINI 0.03* 0.01       Urinalysis:      Lab Results   Component Value Date    NITRU Negative 06/21/2021    WBCUA 0-2 11/30/2020    BACTERIA 2+ 11/30/2020    RBCUA None seen 11/30/2020    BLOODU Negative 06/21/2021    SPECGRAV 1.010 06/21/2021    GLUCOSEU Negative 06/21/2021       Radiology:  XR FOOT LEFT (MIN 3 VIEWS)    (Results Pending)   XR ANKLE LEFT (MIN 3 VIEWS)    (Results Pending)   XR TIBIA FIBULA LEFT (2 VIEWS)    (Results Pending)   XR FOOT RIGHT (MIN 3 VIEWS)    (Results Pending)   XR ANKLE RIGHT (MIN 3 VIEWS)    (Results Pending)   XR TIBIA FIBULA RIGHT (2 VIEWS)    (Results Pending)           Assessment/Plan:    Active Hospital Problems    Diagnosis Date Noted    Heart failure ([facility] [I50.9] 06/21/2021     Assessment and plan  #Acute on chronic combined systolic and diastolic heart failure due to diet noncompliance  Echo 4/2021 EF 04%, grade 3 diastolic dysfunction. Moderate pulmonary hypertension. Moderate MR  Per last cardiology office note patient with 245 pound  Strict intake and output, daily weight. Discussed with cardiology Dr. Katie Barahona will start on Lasix drip once potassium is normal  Continue Aldactone. #Afib with RVR new onset  Cardio on board    #Electrolyte abnormity hypokalemia suspected due to metolazone use replaced now normal.    #CKD stage III  Creatinine 1.7 appears to be baseline  Nephrology consulted. #Diabetes mellitus type 2  Continue Lantus 30 units along with insulin medium dose sliding scale. Will adjust doses per need. HbA1c 11.9% 5/20/2021    #CAD  #CABG x1. Continue aspirin, Plavix, statin, beta-blocker. #Chronic venous ulcers  Podiatry consulted pending eval.      DVT Prophylaxis: Heparin  Diet: ADULT DIET; Regular; Low Fat/Low Chol/High Fiber/CHARANJIT  Code Status: Prior    PT/OT Eval Status: once more stable. Dispo - inpatient. Diuresis.  D/C in 2-3 days    This chart was likely completed using voice recognition technology and may contain unintended grammatical , phraseology,and/or punctuation errors      Ronnie Johnston MD

## 2021-06-22 NOTE — PROGRESS NOTES
Patient received at the floor to room 6328. Patient was lethergic and responds to voice only. Vitals and assessment done. Blood glucose taken and noted to be dropping significantly. MD notified through secure message. Detrose 10% infusion ordered and q2 blood glucose check. Patient on Laxis drip and IV potassium. Fall precaution initiated with the patient. Bed is in low locked position. Patient's call light, and personal belongings within reach. Patient is placed on tele monitoring per order. Patient denies any pain at this time. No acute distress noted at this time. Will continue to monitor.

## 2021-06-22 NOTE — CONSULTS
Cardiology Consultation                                                                    Pt Name: Una Solomon  Age: 60 y.o. Sex: female  : 1965  Location: [id number]-    Referring Physician: Irene Runner, MD  Primary cardiologist: Dr Adrienne Garcia (follows with Radha DeL a Torre CNP)      Reason for Consult:     Reason for Consultation/Chief Complaint: AHF    HPI:      Una Solomon is a 60 y.o. female with a past medical history of CAD s/p PCI to LAD and circumflex , s/p CABG x1 vessel (LIMA to the LAD, 2020), HFrEF due to HOSP [facility], s/p CardioMEMS implant (2019), moderate MR, PAD, CKD, DM, COPD, chronic leg wounds. Echo 2021: Normal LV size, LVEF 83%, diastolic grade 3, moderate MR, RV dilatation with dysfunction, RVSP 52 (RAP 8). R/LHC 2020: RA 15, PA 69/20, W 19, CO 5.4, EDP 22. LIMA to LAD patent with coronaries still due to left subclavian artery stenosis. ECG 2021: NSR with PACs. Home medications torsemide 100 mg daily, metolazone 5 mg 3 times a week, spironolactone 50 mg daily    Patient presented to the emergency room with weight gain of 30 pounds, worsening shortness of breath and lower extremity edema. Patient admits to excess salt in her diet. Potassium was 2.8. ECG revealed AF with RVR, right bundle branch block, inferior, anterior, lateral MI. She was admitted for acute heart failure, hypokalemia, PAFRVR. Patient has not been started on any IV diuretics due to persistent hypokalemia currently being treated with IV and p.o. potassium. She remains off supplemental oxygen. Creatinine down to 1.7 from 2.0. Chest x-ray unremarkable.          Histories     Past Medical History:   has a past medical history of Acute blood loss anemia, Acute kidney injury ([address] Acute kidney injury superimposed on CKD ([address] Acute on chronic combined systolic and diastolic congestive heart failure ([address] Acute on chronic right-sided heart failure ([address] Alcohol dependence ([address] Arthritis, Asthma, CAD (coronary artery disease), Cellulitis, COPD (chronic obstructive pulmonary disease) ([address] Diabetic ulcer of left foot associated with type 2 diabetes mellitus, limited to breakdown of skin ([address] Diabetic ulcer of toe of right foot associated with type 2 diabetes mellitus ([address] Left renal artery stenosis ([address] MI (myocardial infarction) ([address] Positive FIT (fecal immunochemical test), 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 Surgical History:   has a past surgical history that includes Tonsillectomy; Cholecystectomy; tumor excision; Upper gastrointestinal endoscopy (4/25/2013); Upper gastrointestinal endoscopy (12/10/2015); Upper gastrointestinal endoscopy (01/06/2017); Arterial bypass surgry (Left, 01/06/2016); Cardiac catheterization (03/27/2018); Coronary angioplasty with stent (03/16/2018); Rotator cuff repair (Left, 04/22/2016); Coronary angioplasty with stent (01/03/2018); Insertable Cardiac Monitor (02/14/2019); femoral bypass (Right, 5/16/2019); transluminal angioplasty (Left, 10/08/2019); Cardiac catheterization (01/20/2020); Coronary artery bypass graft (N/A, 1/27/2020); vascular surgery (Left, 12/08/2015); transluminal angioplasty (Left, 04/29/2020); vascular surgery (Bilateral, 07/07/2020); Coronary angioplasty with stent (10/07/2019); transesophageal echocardiogram (01/26/2020); vascular surgery (Left, 08/04/2020); vascular surgery (Left, 08/05/2020); and vascular surgery (Left, 09/01/2020). Social History:   reports that she has been smoking cigarettes. She has a 30.00 pack-year smoking history. She has never used smokeless tobacco. She reports that she does not drink alcohol and does not use drugs. Family History:  No evidence for sudden cardiac death or premature CAD      Medications:       Home Medications  Were reviewed and are listed in nursing record.  and/or listed below  Prior to Admission medications    Medication Sig Start Date End Date Taking? Authorizing Provider   QUEtiapine (SEROQUEL) 50 MG tablet Take 100 mg by mouth nightly   Yes Historical Provider, MD   gabapentin (NEURONTIN) 300 MG capsule Take 300 mg by mouth 3 times daily. Yes Historical Provider, MD   insulin aspart (NOVOLOG FLEXPEN) 100 UNIT/ML injection pen Inject 10 Units into the skin 3 times daily (before meals) 5/20/21  Yes TRAY Villavicencio CNP   budesonide-formoterol (SYMBICORT) 160-4.5 MCG/ACT AERO Inhale 2 puffs into the lungs 2 times daily 4/19/21  Yes Swathi Jain MD   torsemide (DEMADEX) 100 MG tablet Take 1 tablet by mouth daily 2/23/21  Yes TRAY Calix CNP   nitroGLYCERIN (NITROSTAT) 0.4 MG SL tablet Place 1 tablet under the tongue every 5 minutes as needed for Chest pain up to max of 3 total doses.  If no relief after 1 dose, call 911. 2/23/21  Yes TRAY Lozano CNP   Insulin Degludec (TRESIBA FLEXTOUCH) 100 UNIT/ML SOPN Inject 30 Units into the skin nightly 2/1/21  Yes TRAY Villavicencio CNP   metOLazone (ZAROXOLYN) 5 MG tablet Take 1 tablet by mouth three times a week On Mon Weds and Friday 1/15/21  Yes Anny Delgado MD   carvedilol (COREG) 3.125 MG tablet TAKE 1 TABLET BY MOUTH TWICE A DAY WITH MEALS 1/11/21  Yes TRAY Lozano CNP   atorvastatin (LIPITOR) 80 MG tablet TAKE 1 TABLET BY MOUTH EVERY DAY AT NIGHT 1/11/21  Yes TRAY Salgado CNP   KLOR-CON M20 20 MEQ extended release tablet TAKE 2 TABLETS BY MOUTH 4 TIMES DAILY 1/4/21  Yes TRAY Lozano CNP   midodrine (PROAMATINE) 5 MG tablet Take 5 mg by mouth 2 times daily   Yes Historical Provider, MD   spironolactone (ALDACTONE) 50 MG tablet Take 50 mg by mouth daily   Yes Historical Provider, MD   tiotropium (SPIRIVA RESPIMAT) 2.5 MCG/ACT AERS inhaler INHALE 2 PUFFS INTO THE LUNGS DAILY 10/13/20  Yes Swathi Jain MD   benztropine (COGENTIN) 1 MG tablet Take 1 mg by mouth nightly  12/13/19  Yes Historical Provider, MD   pantoprazole (PROTONIX) 40 MG tablet Take 1 tablet by mouth 2 times daily 10/1/19  Yes Nikolas Jarvis MD   clopidogrel (PLAVIX) 75 MG tablet TAKE 1 TABLET BY MOUTH EVERY DAY 9/3/19  Yes TRAY Hernandez CNP   magnesium oxide (MAG-OX) 400 (240 Mg) MG tablet TAKE 1 TABLET ONCE DAILY 5/1/19  Yes Nabor Roper MD   busPIRone (BUSPAR) 30 MG tablet Take 30 mg by mouth 2 times daily  4/2/18  Yes Historical Provider, MD   aspirin EC 81 MG EC tablet Take 1 tablet by mouth daily 1/8/16  Yes Jason Daley MD   ARIPiprazole (ABILIFY) 15 MG tablet Take 15 mg by mouth daily    Yes Historical Provider, MD   lamoTRIgine (LAMICTAL) 200 MG tablet Take 200 mg by mouth 2 times daily    Yes Historical Provider, MD   Blood Glucose Monitoring Suppl (ONE TOUCH ULTRA 2) w/Device KIT USE TO MONITOR BLOOD GLUCOSE LEVELS 5/26/21   TRAY Fregoso CNP   ONE TOUCH ULTRASOFT LANCETS MISC USE TO TEST BLOOD GLUCOSE LEVELS 4 TIMES DAILY 5/26/21   PrincessTRAY Sanches CNP   blood glucose test strips (ASCENSIA AUTODISC VI;ONE TOUCH ULTRA TEST VI) strip USE TO MONITOR BLOOD GLUCOSE LEVELS 4 TIMES DAILY 5/26/21   PrincessTRAY Sanches CNP   Insulin Pen Needle (B-D ULTRAFINE III SHORT PEN) 31G X 8 MM MISC Five shots a day 2/1/21   TRAY Fregoso CNP   blood glucose test strips (FREESTYLE LITE) strip USE TO CHECK BLOOD GLUCOSE LEVELS FOUR TIMES DAILY 11/17/20   Coco Mcfadden MD   INSULIN SYRINGE .5CC/29G 29G X 1/2\" 0.5 ML MISC 1 each by Does not apply route daily 12/3/19   TRAY Fregoso CNP          Inpatient Medications:   budesonide  0.5 mg Nebulization BID    And    Arformoterol Tartrate  15 mcg Nebulization BID    heparin (porcine)  5,000 Units Subcutaneous 3 times per day    metoprolol tartrate  25 mg Oral BID    spironolactone  50 mg Oral Daily    ARIPiprazole  15 mg Oral Daily    benztropine  1 mg Oral Nightly    busPIRone  30 mg Oral BID    doxepin  25 mg Oral Nightly    lamoTRIgine  200 mg Oral BID    methocarbamol  500 mg Oral BID    insulin glargine  30 Units Subcutaneous Nightly    insulin lispro  0-12 Units Subcutaneous TID WC    insulin lispro  0-6 Units Subcutaneous Nightly    aspirin EC  81 mg Oral Daily    atorvastatin  80 mg Oral Nightly    clopidogrel  75 mg Oral Daily    pantoprazole  40 mg Oral BID AC       IV drips:   furosemide (LASIX) 1mg/ml infusion 5 mg/hr (06/22/21 1541)    dextrose         PRN:  oxyCODONE-acetaminophen, glucose, dextrose, glucagon (rDNA), dextrose    Allergy:     Lisinopril       Review of Systems:     All 12 point review of symptoms completed. Pertinent positives identified in the HPI, all other review of symptoms negative as below. CONSTITUTIONAL: No fatigue  SKIN: No rash or pruritis. EYES: No visual changes or diplopia. No scleral icterus. ENT: No Headaches, hearing loss or vertigo. No mouth sores or sore throat. CARDIOVASCULAR: No chest pain/chest pressure/chest discomfort. No palpitations. ++ edema. RESPIRATORY: + dyspnea. No cough or wheezing, no sputum production. GASTROINTESTINAL: No N/V/D. No abdominal pain, appetite loss, blood in stools. GENITOURINARY: No dysuria, trouble voiding, or hematuria. MUSCULOSKELETAL:  No gait disturbance, weakness or joint complaints. NEUROLOGICAL: No headache, diplopia, change in muscle strength, numbness or tingling. No change in gait, balance, coordination, mood, affect, memory, mentation, behavior. ENDOCRINE: No excessive thirst, fluid intake, or urination. No tremor. HEMATOLOGIC: No abnormal bruising or bleeding. ALLERGY: No nasal congestion or hives.       Physical Examination:     Vitals:    06/22/21 0817 06/22/21 1015 06/22/21 1122 06/22/21 1539   BP:   108/75 110/74   Pulse:   114 112   Resp:   18 18   Temp:   97.7 °F (36.5 °C) 98.1 °F (36.7 °C)   TempSrc:   Oral Oral   SpO2: 98%  96% 96%   Weight:       Height:  5' 4\" (1.626 m)         Wt Readings from Last 3 06/21/21  1505   TROPONINI 0.03* 0.01     No results for input(s): BNP in the last 72 hours. No results for input(s): TSH in the last 72 hours. No results for input(s): CHOL, HDL, LDLCALC, TRIG in the last 72 hours.]    Lab Results   Component Value Date    TROPONINI 0.01 06/21/2021         Imaging:     Telemetry:  AF with RVR      Assessment / Plan:     1. Acute on chronic HFrEF:  It is ischemic in etiology. Patient is volume overloaded but hemodynamically stable. It is most likely due to excess dietary salt. Patient reports compliance with medications.    -Once hypokalemia is corrected, recommend starting Lasix drip at 5 mg/h  -Will add spironolactone 50 mg daily  -Frequent BMP check and correction of potassium  -We will change carvedilol 3.125 twice daily to Lopressor 25 twice daily (in view of low normal BP and AF RVR). - Unable to start ACEI/ARB/spironolactone or Entresto due to CKD  -Patient does not have an ICD  - Strict I's and O's every shift and standing weights if possible, low-salt diet and daily BMP with reflex to Mg, wean supplemental oxygen to off for sats greater than 94%. 2.  PAF with RVR:  This is a new diagnosis for patient. It could be precipitating her acute heart failure.    -We will treat with beta-blockers  -Once hypokalemia is corrected, will give digoxin 250 mcg IV x1 now. If AF RVR continues tomorrow, could give 2 additional doses of 250 mcg IV every 6 hours.  -We will place patient on Eliquis. 3.  CAD s/p stents and CABG:  There is no evidence of ACS with this admission.    -Continue with Plavix, will stop baby aspirin in view of initiation of anticoagulation. Due to the high probability of clinically significant life threating deterioration of the patient's condition that required my urgent intervention, a total critical care time 35 minutes was used. This time excludes any time that may have been spent performing procedures.  This includes but not limited to vital sign monitoring, telemetry monitoring, continuous pulse oximety, IV medication, clinical response to the IV medications, documentation time, consultation time, interpretation of lab data, review of nursing notes and old record review. I have personally reviewed the reports and images of labs, radiological studies, cardiac studies including ECG's and telemetry, current and old medical records. The note was completed using EMR and Dragon dictation system. Every effort was made to ensure accuracy; however, inadvertent computerized transcription errors may be present. All questions and concerns were addressed to the patient/family. Alternatives to my treatment were discussed. I would like to thank you for providing me the opportunity to participate in the care of your patient. If you have any questions, please do not hesitate to contact me.      Cheyenne Whitt MD, [facility], [address]  The [address] [address] [place] [address]  Ph: [phone]  Fax: [phone]

## 2021-06-22 NOTE — H&P
HOSPITALISTS HISTORY AND PHYSICAL    6/22/2021 1:18 AM    Patient Information:  Ainsley Caldwell is a 60 y.o. female [id number]  PCP:  Chrissy Mcclellan PA-C (Tel: [phone] )    Chief complaint:      Problem List  Active Problems:    Heart failure [facility])  Resolved Problems:    * No resolved hospital problems. *        Assessment/Plan:   Acute on chronic systolic heart failure with CKD stage 3  IV lasix drip once potassium corrected patient was started on Lasix drip with significant response but held given critical hypokalemia persisting  Significant rt sided heart failure  Cardiology consultation  Probably might need nephrology input       Hypoglycemia with DM  Hold insulin  Hypoglycemia protocol,   D10 drip for now, d/c once blood sugars consistently above 120s     Critical hypokalemia  Replacement protocol ordered, will have to replace potassium patient did had significant diuresis on starting the patient on Lasix drip we have to hold the Lasix drip for now probably resume it once potassium    Peripheral vascular disease  COPD      DVT prophylaxis heparin  Code status  Full code   Diet Cardiac  IV access  Peripheral  Lees Catheter No    Admit as inpatient. I anticipate hospitalization spanning more than two midnights for investigation and treatment of the above medically necessary diagnoses. History of Present Illness:  Gilberto Langley is a 60 y.o. female with a history of coronary artery disease status post PCI to LAD and left circumflex in 2018, CABG x1, history of ischemic cardiomyopathy, peripheral arterial disease leg wound diabetes mellitus COPD gout is a transfer from [facility] . Patient apparently presented there for shortness of breath and increased weight gain seems like she had a 30 pound weight gain. Patient seems like has been getting out patient Lasix injection without any benefit.   Patient does have history of chronic kidney disease. Apparently patient did had an episode of drowsiness with hypoglycemia at outside ER which persisted on arrival.  ABG did not show any hypercarbia. In fact confusion improved on putting the patient on D10. Patient also did had significant hypokalemia admission. History obtained from patient and going over the chart  Old medical records show   EF of 25% and April 2021  Cardiology visit June 17 noticed, nephrology visit in [facility]    REVIEW OF SYSTEMS:   All other ROS negative except mentioned in [address]      Past Medical History:   has a past medical history of Acute blood loss anemia, Acute kidney injury ([address] Acute kidney injury superimposed on CKD ([address] Acute on chronic combined systolic and diastolic congestive heart failure ([address] Acute on chronic right-sided heart failure ([address] Alcohol dependence ([address] Arthritis, Asthma, CAD (coronary artery disease), Cellulitis, COPD (chronic obstructive pulmonary disease) ([address] Diabetic ulcer of left foot associated with type 2 diabetes mellitus, limited to breakdown of skin ([address] Diabetic ulcer of toe of right foot associated with type 2 diabetes mellitus ([address] Left renal artery stenosis ([address] MI (myocardial infarction) ([address] Positive FIT (fecal immunochemical test), 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 Past Surgical History:   has a past surgical history that includes Tonsillectomy; Cholecystectomy; tumor excision; Upper gastrointestinal endoscopy (4/25/2013); Upper gastrointestinal endoscopy (12/10/2015); Upper gastrointestinal endoscopy (01/06/2017); Arterial bypass surgry (Left, 01/06/2016); Cardiac catheterization (03/27/2018); Coronary angioplasty with stent (03/16/2018); Rotator cuff repair (Left, 04/22/2016); Coronary angioplasty with stent (01/03/2018);  Insertable Cardiac Monitor (02/14/2019); femoral bypass (Right, 5/16/2019); transluminal angioplasty (Left, 10/08/2019); Cardiac catheterization (01/20/2020); Coronary artery bypass graft (N/A, 1/27/2020); vascular surgery (Left, 12/08/2015); transluminal angioplasty (Left, 04/29/2020); vascular surgery (Bilateral, 07/07/2020); Coronary angioplasty with stent (10/07/2019); transesophageal echocardiogram (01/26/2020); vascular surgery (Left, 08/04/2020); vascular surgery (Left, 08/05/2020); and vascular surgery (Left, 09/01/2020). Medications:  No current facility-administered medications on file prior to encounter. Current Outpatient Medications on File Prior to Encounter   Medication Sig Dispense Refill    Blood Glucose Monitoring Suppl (ONE TOUCH ULTRA 2) w/Device KIT USE TO MONITOR BLOOD GLUCOSE LEVELS 1 kit 0    ONE TOUCH ULTRASOFT LANCETS MISC USE TO TEST BLOOD GLUCOSE LEVELS 4 TIMES DAILY 150 each 3    blood glucose test strips (ASCENSIA AUTODISC VI;ONE TOUCH ULTRA TEST VI) strip USE TO MONITOR BLOOD GLUCOSE LEVELS 4 TIMES DAILY 150 each 3    insulin aspart (NOVOLOG FLEXPEN) 100 UNIT/ML injection pen Inject 10 Units into the skin 3 times daily (before meals) 5 pen 3    QUEtiapine (SEROQUEL XR) 50 MG extended release tablet Take 1 mg by mouth nightly Not sure of dose      budesonide-formoterol (SYMBICORT) 160-4.5 MCG/ACT AERO Inhale 2 puffs into the lungs 2 times daily 1 Inhaler 5    torsemide (DEMADEX) 100 MG tablet Take 1 tablet by mouth daily 90 tablet 1    nitroGLYCERIN (NITROSTAT) 0.4 MG SL tablet Place 1 tablet under the tongue every 5 minutes as needed for Chest pain up to max of 3 total doses.  If no relief after 1 dose, call 911. 25 tablet 0    Insulin Degludec (TRESIBA FLEXTOUCH) 100 UNIT/ML SOPN Inject 30 Units into the skin nightly 10 pen 5    Insulin Pen Needle (B-D ULTRAFINE III SHORT PEN) 31G X 8 MM MISC Five shots a day 200 each 5    insulin lispro (HUMALOG KWIKPEN U-200) 200 UNIT/ML SOPN pen Inject 10 Units into the skin 3 times daily (before meals) 2 pen 5    metOLazone (ZAROXOLYN) 5 MG tablet Take 1 tablet by mouth three times a week On Mon Weds and Friday 1 tablet 0    carvedilol (COREG) 3.125 MG tablet TAKE 1 TABLET BY MOUTH TWICE A DAY WITH MEALS 180 tablet 3    atorvastatin (LIPITOR) 80 MG tablet TAKE 1 TABLET BY MOUTH EVERY DAY AT NIGHT 90 tablet 3    doxepin (SINEQUAN) 25 MG capsule TAKE 1 CAPSULE BY MOUTH EVERY DAY AT NIGHT      KLOR-CON M20 20 MEQ extended release tablet TAKE 2 TABLETS BY MOUTH 4 TIMES DAILY 720 tablet 1    methocarbamol (ROBAXIN) 500 MG tablet Take 500 mg by mouth 2 times daily      midodrine (PROAMATINE) 5 MG tablet Take 5 mg by mouth 2 times daily      spironolactone (ALDACTONE) 50 MG tablet Take 50 mg by mouth daily      blood glucose test strips (FREESTYLE LITE) strip USE TO CHECK BLOOD GLUCOSE LEVELS FOUR TIMES DAILY 200 strip 10    tiotropium (SPIRIVA RESPIMAT) 2.5 MCG/ACT AERS inhaler INHALE 2 PUFFS INTO THE LUNGS DAILY 1 Inhaler 6    benztropine (COGENTIN) 1 MG tablet Take 1 mg by mouth nightly       INSULIN SYRINGE .5CC/29G 29G X 1/2\" 0.5 ML MISC 1 each by Does not apply route daily 100 each 3    pantoprazole (PROTONIX) 40 MG tablet Take 1 tablet by mouth 2 times daily 60 tablet 0    clopidogrel (PLAVIX) 75 MG tablet TAKE 1 TABLET BY MOUTH EVERY DAY 30 tablet 5    magnesium oxide (MAG-OX) 400 (240 Mg) MG tablet TAKE 1 TABLET ONCE DAILY 30 tablet 11    busPIRone (BUSPAR) 30 MG tablet Take 30 mg by mouth 2 times daily       aspirin EC 81 MG EC tablet Take 1 tablet by mouth daily 30 tablet 3    ARIPiprazole (ABILIFY) 15 MG tablet Take 15 mg by mouth daily       lamoTRIgine (LAMICTAL) 200 MG tablet Take 200 mg by mouth 2 times daily          Allergies: Allergies   Allergen Reactions    Lisinopril Other (See Comments)     Severe hypotension        Social History:  Patient Lives with a friend   reports that she has been smoking cigarettes. She has a 30.00 pack-year smoking history.  She has never used smokeless tobacco. She reports that she does not drink alcohol and does not use drugs. Family History:  family history includes Cancer in her maternal aunt and mother; Heart Disease in her maternal grandmother. ,     Physical Exam:  /77   Pulse 95   Temp 97.8 °F (36.6 °C) (Oral)   Resp 20   LMP 10/24/2012   SpO2 96%   Patient is lethargic but arousable patient was evaluated multiple times initially drowsy but much more awake after being on dextrose drip  Patient does not seem to have pretty tense edema of lower extremities extending up to the thighs  Lungs are actually clear   She is moving all extremities  Moderate distress  Eyes: Sclera clear, pupils equal  ENT: Moist mucus membranes, no thrush. Trachea midline. Cardiovascular: Regular rhythm, normal S1, S2. No murmur, gallop, rub. edema in lower extremities  Gastrointestinal: Abdomen soft, non-tender, not distended, normal bowel sounds  Musculoskeletal: No cyanosis in digits, neck supple  Neurology: Lethargic barely arousable but moving all extremities on arousing her psychiatry: Appropriate affect.  Not agitated  Skin ulcerations of lower extremities noticed  Labs:  CBC:   Lab Results   Component Value Date    WBC 11.5 06/21/2021    RBC 4.52 06/21/2021    HGB 12.9 06/21/2021    HCT 39.5 06/21/2021    MCV 87.4 06/21/2021    MCH 28.5 06/21/2021    MCHC 32.6 06/21/2021    RDW 15.9 06/21/2021     06/21/2021    MPV 8.5 06/21/2021     BMP:    Lab Results   Component Value Date     06/21/2021    K 2.9 06/21/2021    CL 85 06/21/2021    CO2 33 06/21/2021    BUN 69 06/21/2021    CREATININE 1.8 06/21/2021    CALCIUM 9.1 06/21/2021    GFRAA 35 06/21/2021    LABGLOM 29 06/21/2021    GLUCOSE 88 06/21/2021     No orders to display     Chest Xray:   EKG:    I visualized CXR images and EKG strips wide complex with bundle branch block    Discussed case  with ER provider    Please note that some part of this chart was generated using Dragon dictation software. Although every effort was made to ensure the accuracy of this automated transcription, some errors in transcription may have occurred inadvertently. If you may need any clarification, please do not hesitate to contact me through [facility].        Amado Menendez MD    6/22/2021 1:18 AM

## 2021-06-22 NOTE — PROGRESS NOTES
4 Eyes Admission Assessment     I agree as the admission nurse that 2 RN's have performed a thorough Head to Toe Skin Assessment on the patient. ALL assessment sites listed below have been assessed on admission. Areas assessed by both nurses: Babatunde/August  [x]   Head, Face, and Ears   [x]   Shoulders, Back, and Chest  [x]   Arms, Elbows, and Hands   [x]   Coccyx, Sacrum, and Ischium  [x]   Legs, Feet, and Heels        Does the Patient have Skin Breakdown? Yes a wound was noted on the Admission Assessment and an LDA was Initiated documentation include the Shaye-wound, Wound Assessment, Measurements, Dressing Treatment, Drainage, and Color\",   Pt has scattered bruises/abrasions. Pt has multiple open area/sores and blisters to BLE. Pt toes on left foot black on tips. Blanchable redness to bottom.        Vivek Prevention initiated:  Yes   Wound Care Orders initiated:  No      [facility] nurse consulted for Pressure Injury (Stage 3,4, Unstageable, DTI, NWPT, and Complex wounds) or Vivek score 18 or lower:  No      Nurse 1 eSignature: Electronically signed by Xochitl Botello RN on 6/22/21 at 4:03 AM EDT    **SHARE this note so that the co-signing nurse is able to place an eSignature**    Nurse 2 eSignature: Electronically signed by Lindsay Damon RN on 6/22/21 at 4:05 AM EDT

## 2021-06-23 NOTE — PROGRESS NOTES
Cardiology Consult Service  Daily Progress Note        Admit Date:  6/21/2021  Primary cardiologist: Dr Nicky Schroeder (follows with Jorge Luis Henderson CNP)    Reason for Consultation/Chief Complaint: AHF    Subjective:      Giles Coleman is a 60 y.o. female with a past medical history of CAD s/p PCI to LAD and circumflex 2018, s/p CABG x1 vessel (LIMA to the LAD, 01/2020), HFrEF due to [facility], s/p CardioMEMS implant (02/2019), moderate MR, PAD s/p RA stent, CKD, DM, COPD, chronic hyponatremia, chronic leg wounds.     Echo 04/2021: Normal LV size, LVEF 13%, diastolic grade 3, moderate MR, RV dilatation with dysfunction, RVSP 52 (RAP 8).     R/LHC 08/2020: RA 15, PA 69/20, W 19, CO 5.4, EDP 22. LIMA to LAD patent with coronaries still due to left subclavian artery stenosis.     ECG 04/2021: NSR with PACs.     Home medications torsemide 100 mg daily, metolazone 5 mg 3 times a week, spironolactone 50 mg daily     Patient presented to the emergency room with weight gain of 30 pounds, worsening shortness of breath and lower extremity edema. Patient admits to excess salt in her diet. Potassium was 2.8. ECG revealed AF with RVR, right bundle branch block, inferior, anterior, lateral MI. She was admitted for acute heart failure, hypokalemia, PAFRVR. Patient has not been started on any IV diuretics due to persistent hypokalemia currently being treated with IV and p.o. potassium. She remains off supplemental oxygen. Creatinine down to 1.7 from 2.0. Chest x-ray unremarkable. Interval history:  Patient reports improvement with her symptoms. I's and O's -2.6 L since admission, she remains off supplemental oxygen.     Objective:     Medications:   potassium chloride  40 mEq Oral BID WC    budesonide  0.5 mg Nebulization BID    And    Arformoterol Tartrate  15 mcg Nebulization BID    metoprolol tartrate  25 mg Oral BID    spironolactone  50 mg Oral Daily    ARIPiprazole  15 mg Oral Daily    benztropine  1 mg Oral Nightly    busPIRone  30 mg Oral BID    doxepin  25 mg Oral Nightly    lamoTRIgine  200 mg Oral BID    methocarbamol  500 mg Oral BID    [Held by provider] insulin glargine  30 Units Subcutaneous Nightly    insulin lispro  0-12 Units Subcutaneous TID WC    insulin lispro  0-6 Units Subcutaneous Nightly    apixaban  5 mg Oral BID    atorvastatin  80 mg Oral Nightly    clopidogrel  75 mg Oral Daily    pantoprazole  40 mg Oral BID AC       IV drips:   furosemide (LASIX) 1mg/ml infusion 5 mg/hr (06/23/21 0926)    dextrose         PRN:  oxyCODONE-acetaminophen, glucose, dextrose, glucagon (rDNA), dextrose    Vitals:    06/23/21 0650 06/23/21 0723 06/23/21 0942 06/23/21 1142   BP:  118/77  100/73   Pulse:  73  80   Resp:  18 18 18   Temp:  98.8 °F (37.1 °C)  98.5 °F (36.9 °C)   TempSrc:  Oral  Oral   SpO2:  95% 99% 98%   Weight: 248 lb 0.3 oz (112.5 kg)      Height:           Intake/Output Summary (Last 24 hours) at 6/23/2021 1357  Last data filed at 6/23/2021 1348  Gross per 24 hour   Intake 1884 ml   Output 3151 ml   Net -1267 ml     I/O last 3 completed shifts: In: 1849 [P.O.:1800; I.V.:49]  Out: 2501 [Urine:2500; Stool:1]  Wt Readings from Last 3 Encounters:   06/23/21 248 lb 0.3 oz (112.5 kg)   06/21/21 245 lb (111.1 kg)   06/17/21 245 lb (111.1 kg)       Admit Wt: Weight: 249 lb (112.9 kg)   Todays Wt: Weight: 248 lb 0.3 oz (112.5 kg)    TELEMETRY: Atrial fibrillation with CVR 70s    Physical Exam:         General Appearance:  Alert, cooperative, no distress, appears stated age Appropriate weight   Head:  Normocephalic, without obvious abnormality, atraumatic   Eyes:  PERRL, conjunctiva/corneas clear EOM intact  Ears normal   Throat no lesions       Nose: Nares normal, no drainage or sinus tenderness   Throat: Lips, mucosa, and tongue normal   Neck: Supple, symmetrical, trachea midline, no adenopathy, thyroid: not enlarged, symmetric, no tenderness/mass/nodules, no carotid bruit.         Lungs:   Normal respiratory rate, lungs mild basilar ronchi bilaterally. Chest Wall:  No tenderness or deformity   Heart:  Irregular rhythm, rate is controlled, S1, S2 normal, there is no murmur, there is no rub or gallop, cannot assess jvd, 2+ bilateral lower extremity edema   Abdomen:   Soft, non-tender, bowel sounds active all four quadrants,  no masses, no organomegaly       Extremities: Extremities normal, atraumatic, no cyanosis. Pulses: 2+ and symmetric   Skin: Skin color, texture, turgor normal, no rashes or lesions   Pysch: Normal mood and affect   Neurologic: Normal gross motor and sensory exam.  Cranial nerves intact       Labs:   Recent Labs     06/21/21  1030 06/21/21  2231 06/22/21  0448 06/22/21  1232 06/23/21  0649   * 131* 130* 127* 123*   K 2.8* 2.9* 2.9* 3.9 3.0*   BUN 77* 69* 68* 65* 64*   CREATININE 2.0* 1.8* 1.7* 1.7* 1.6*   CL 88* 85* 83* 85* 80*   CO2 34* 33* 33* 29 30   GLUCOSE 85 88 103* 198* 21*   CALCIUM 9.0 9.1 9.0 9.0 9.5   MG 2.00 2.30 2.20  --   --      Recent Labs     06/21/21  1030 06/22/21  0448 06/23/21  0649   WBC 11.5* 9.9 7.1   HGB 12.9 12.9 13.8   HCT 39.5 38.7 41.7    235 285   MCV 87.4 87.8 88.1     No results for input(s): CHOLTOT, TRIG, HDL in the last 72 hours. Invalid input(s): LIPIDCOMM, CHOLHDL, VLDCHOL, LDL  Recent Labs     06/22/21  1910   INR 1.16*     Recent Labs     06/21/21  1030 06/21/21  1505   TROPONINI 0.03* 0.01     No results for input(s): BNP in the last 72 hours. No results for input(s): NTPROBNP in the last 72 hours. No results for input(s): TSH in the last 72 hours. Imaging:       Assessment & Plan:     1. Acute on chronic HFrEF:  It is ischemic in etiology. Patient is volume overloaded but hemodynamically stable. It is most likely due to excess dietary salt. Patient reports compliance with medications.     -Hold Lasix drip at 5 mg/h today due to progressively worse hyponatremia (Na now 123 from 134 on admission) in the setting of improving Cr.   Per nephrology note, patient has history of chronic hyponatremia, possible SIADH. Hence, consider Samsca. -Will increase spironolactone 50 mg bid.   -Frequent BMP check and correction of potassium  -I have changed carvedilol 3.125 twice daily to Lopressor 25 twice daily (in view of low normal BP and AF RVR). - Unable to start ACEI/ARB/spironolactone or Entresto due to CKD (allergy to lisino is severe hypotension, likely not a true allergy, rather a dose effect)  -Patient does not have an ICD  - Strict I's and O's every shift and standing weights if possible, low-salt diet and daily BMP with reflex to Mg, wean supplemental oxygen to off for sats greater than 94%.     2. PAF with RVR:  This is a new diagnosis for patient. It could be precipitating her acute heart failure.     -We will treat with beta-blockers  -Patient received digoxin 250 mcg IV x1 on 6/22, rate is now controlled. -Now on Eliquis.     3. CAD s/p stents and CABG:  There is no evidence of ACS with this admission.     -Continue with Plavix, will stop baby aspirin in view of initiation of anticoagulation. I have spent 35 minutes of face to face time with the patient with more than 50% spent counseling and coordinating care. I have personally reviewed the reports and images of labs, radiological studies, cardiac studies including ECG's and telemetry, current and old medical records. The note was completed using EMR and Dragon dictation system. Every effort was made to ensure accuracy; however, inadvertent computerized transcription errors may be present. All questions and concerns were addressed to the patient/family. Alternatives to my treatment were discussed. Thank you for allowing to us to participate in the care or [place] [place]. Please call our service with questions.     Ethan Alberts MD, [facility], [address]  The [address]  [address] [address] [place] [address]  Ph: [phone]  Fax: [phone]

## 2021-06-23 NOTE — PROGRESS NOTES
Hospitalist Progress Note      PCP: Ketan Kc PA-C    Date of Admission: 6/21/2021    Chief Complaint: SOB    Hospital Course: Patient is 61-year-old female with history of CAD, CABG x1, ischemic cardiomyopathy, CHF s/p CardioMEMS implant 2/2019, COPD, PAD, CKD stage III presented to [facility] ER with complaint of shortness of breath and weight gain of 30 pounds. Patient reported that she has been noncompliant with her diet having a lot of salt in food. Patient was noted to be in A. fib with RVR on telemetry. Podiatry was consulted for chronic venous ulcers. Patient had abnormal artery duplex vascular surgery was consulted recommended Follow-up with Dr. Vel Montilla as an outpatient    Subjective  Patient was noted to have hypoglycemic event early morning. Will stop Lantus. Patient was asymptomatic. Reported breathing is slightly better. Denies any chest pain, nausea, vomiting. Currently in A. fib rate controlled.     Medications:  Reviewed    Infusion Medications    furosemide (LASIX) 1mg/ml infusion 5 mg/hr (06/23/21 9026)    dextrose       Scheduled Medications    potassium chloride  40 mEq Oral BID WC    budesonide  0.5 mg Nebulization BID    And    Arformoterol Tartrate  15 mcg Nebulization BID    metoprolol tartrate  25 mg Oral BID    spironolactone  50 mg Oral Daily    ARIPiprazole  15 mg Oral Daily    benztropine  1 mg Oral Nightly    busPIRone  30 mg Oral BID    doxepin  25 mg Oral Nightly    lamoTRIgine  200 mg Oral BID    methocarbamol  500 mg Oral BID    [Held by provider] insulin glargine  30 Units Subcutaneous Nightly    insulin lispro  0-12 Units Subcutaneous TID WC    insulin lispro  0-6 Units Subcutaneous Nightly    apixaban  5 mg Oral BID    atorvastatin  80 mg Oral Nightly    clopidogrel  75 mg Oral Daily    pantoprazole  40 mg Oral BID AC     PRN Meds: oxyCODONE-acetaminophen, glucose, dextrose, glucagon (rDNA), dextrose      Intake/Output Summary (Last 24 hours) at 6/23/2021 1249  Last data filed at 6/23/2021 1155  Gross per 24 hour   Intake 1809 ml   Output 2501 ml   Net -692 ml       Physical Exam Performed:    /73   Pulse 80   Temp 98.5 °F (36.9 °C) (Oral)   Resp 18   Ht 5' 4\" (1.626 m)   Wt 248 lb 0.3 oz (112.5 kg)   LMP 10/24/2012   SpO2 98%   BMI 42.57 kg/m²     General appearance: NAD. HEENT: Pupils equal, round, and reactive to light. Conjunctivae/corneas clear. Neck: Supple, with full range of motion. No jugular venous distention. Trachea midline. Respiratory: Bilateral diminished breathing sounds with mild crackles. No wheezes no rhonchi. Cardiovascular: Irregular rhythm with normal Z3/I4, grade 2 systolic murmur. Abdomen: Soft, non-tender, non-distended with normal bowel sounds. Musculoskeletal: Bilateral lower extremity 3+ pitting edema  Skin: Chronic venous ulcers on both legs. Neurologic:  Neurovascularly intact without any focal sensory/motor deficits.  Cranial nerves: II-XII intact, grossly non-focal.  Psychiatric: Alert and oriented, thought content appropriate, normal insight  Capillary Refill: Brisk,< 3 seconds   Peripheral Pulses: +2 palpable, equal bilaterally       Labs:   Recent Labs     06/21/21  1030 06/22/21  0448 06/23/21  0649   WBC 11.5* 9.9 7.1   HGB 12.9 12.9 13.8   HCT 39.5 38.7 41.7    235 285     Recent Labs     06/22/21  0448 06/22/21  1232 06/23/21  0649   * 127* 123*   K 2.9* 3.9 3.0*   CL 83* 85* 80*   CO2 33* 29 30   BUN 68* 65* 64*   CREATININE 1.7* 1.7* 1.6*   CALCIUM 9.0 9.0 9.5     Recent Labs     06/21/21  1030   AST 14*   ALT 14   BILITOT 0.6   ALKPHOS 166*     Recent Labs     06/22/21  1910   INR 1.16*     Recent Labs     06/21/21  1030 06/21/21  1505   TROPONINI 0.03* 0.01       Urinalysis:      Lab Results   Component Value Date    NITRU Negative 06/21/2021    WBCUA 0-2 11/30/2020    BACTERIA 2+ 11/30/2020    RBCUA None seen 11/30/2020    BLOODU Negative 06/21/2021    SPECGRAV 1.010 5/20/2021    #CAD  #CABG x1. Continue aspirin, Plavix, statin, beta-blocker. #Chronic venous ulcers  Podiatry consulted pending eval.      DVT Prophylaxis: Heparin  Diet: Adult Oral Nutrition Supplement; Protein Modular  ADULT DIET; Regular; Low Sodium (2 gm)  Code Status: Prior    PT/OT Eval Status: Consulted. Dispo - inpatient. Diuresis.  D/C in 2-3 days    This chart was likely completed using voice recognition technology and may contain unintended grammatical , phraseology,and/or punctuation errors      Kingsley Pierce MD

## 2021-06-23 NOTE — DISCHARGE INSTR - COC
Continuity of Care Form    Patient Name: Dulce Nolan   :  1965  MRN:  [id number]    Admit date:  2021  Discharge date:  ***    Code Status Order: Prior   Advance Directives:   [address] [place] [address] Documentation       Date/Time Healthcare Directive Type of Healthcare Directive Copy in [address] [facility] [address] Agent's Name Healthcare Agent's Phone Number    [phone]  No, patient does not have an advance directive for healthcare treatment  --  --  --  --  --            Admitting Physician:  Zoran Rebollar MD  PCP: Alice Llanes PA-C    Discharging Nurse: [facility] Unit/Room#: 7649/2080-94  Discharging Unit Phone Number: [phone]    Emergency Contact:   Extended Emergency Contact Information  Primary Emergency Contact: Nataliia Cerda \"Lanny\"  Home Phone: [phone]  Relation: Child  Preferred language: English   needed?  No  Secondary Emergency Contact: [address] [place] [address] Phone: [phone]  Relation: Other    Past Surgical History:  Past Surgical History:   Procedure Laterality Date    ARTERIAL BYPASS SURGRY Left 2016    Axillary/Subclavian to Brachial Bypass w/reversed SVG    CARDIAC CATHETERIZATION  2018    Dr. Lolita Chand - at [address]  2020    Dr. Salma Herrera      pancreatitis post surgery    CORONARY ANGIOPLASTY WITH STENT PLACEMENT  2018    MELODY- 3.5 x 38 and 3.5 x 20 to Cx    CORONARY ANGIOPLASTY WITH STENT PLACEMENT  2018    MELODY- 3.0 x 38 and 2.75 x 26 and 2.75 x 8 and 2.75 x 22 to the LAD    CORONARY ANGIOPLASTY WITH STENT PLACEMENT  10/07/2019    MELODY- 2.5 x 8 to mLAD    CORONARY ARTERY BYPASS GRAFT N/A 2020    CORONARY ARTERY BYPASS GRAFTING X 1, ON PUMP BEATING HEART, INTERNAL MAMMARY ARTERY TO LEFT ANTERIOR DESCENDING ARTERY, VAS CATH INSERTION, URI, PLATELET GEL APPLICATION, 5 LEVEL BILATERAL INTERCOSTAL NERVE BLOCK, STERNAL PLATING performed by Bethanie Joseph MD at [address] Right 5/16/2019    fem-pop, performed by Dianne Gutiérrez MD at [address]  02/14/2019    CardioMEMs insertion for CHF    ROTATOR CUFF REPAIR Left 04/22/2016    TONSILLECTOMY      TRANSESOPHAGEAL ECHOCARDIOGRAM  01/26/2020    TRANSLUMINAL ANGIOPLASTY Left 10/08/2019    with stenting, at [facility]   [address] Left 04/29/2020    of the [facility] re-occlusion    TUMOR EXCISION      benign tumors X 2 chest & axilla    UPPER GASTROINTESTINAL ENDOSCOPY  4/25/2013    BX barretts, HH, gastritis    UPPER GASTROINTESTINAL ENDOSCOPY  12/10/2015    UPPER GASTROINTESTINAL ENDOSCOPY  01/06/2017    Gastritis    VASCULAR SURGERY Left 12/08/2015    upper extremity and mesenteric angiogram (diagnostic only)    VASCULAR SURGERY Bilateral 07/07/2020    diagnostic lower extremity angiogram only    VASCULAR SURGERY Left 08/04/2020    angioplasty and stent of renal artery    VASCULAR SURGERY Left 08/05/2020    coil embolization of branch renal artery vessel for bleeding    VASCULAR SURGERY Left 09/01/2020    angioplasty and stenting of subclavian artery       Immunization History:   Immunization History   Administered Date(s) Administered    Influenza Vaccine, unspecified formulation 11/04/2016    Influenza Virus Vaccine 12/11/2015, 11/21/2017    Influenza, Quadv, IM, (6 mo and older Fluzone, Flulaval, Fluarix and 3 yrs and older Afluria) 12/19/2013, 10/14/2016, 11/09/2017    Influenza, Vera Finner, IM, PF (6 mo and older Fluzone, Flulaval, Fluarix, and 3 yrs and older Afluria) 10/01/2019    Pneumococcal Polysaccharide ([id number]) 12/11/2015       Active Problems:  Patient Active Problem List   Diagnosis Code    Type 2 diabetes mellitus with diabetic polyneuropathy, with long-term current use of insulin ([address] E11.42, Z79.4    Essential hypertension I10    CLINT (obstructive sleep apnea) G47.33    Tobacco abuse disorder Z72.0    GERD (gastroesophageal reflux disease) K21.9    Anxiety and depression F41.9, F32.9    Hyponatremia E87.1    Iron deficiency anemia D50.9    CAD (coronary artery disease) I25.10    Mitral valve regurgitation I34.0    COPD (chronic obstructive pulmonary disease) ([facility]) J44.9    SHAUNNA (acute kidney injury) ([facility]) N17.9    Vitamin D deficiency E55.9    Dyslipidemia E78.5    Abel's esophagus K22.70    Acute on chronic congestive heart failure (HCC) I50.9    Viral hepatitis C B19.20    Pulmonary nodule R91.1    Class 2 severe obesity due to excess calories with serious comorbidity and body mass index (BMI) of 39.0 to 39.9 in adult ([facility]) E66.01, Z68.39    Bipolar disorder ([facility]) F31.9    Pulmonary hypertension ([facility]) I27.20    Lower extremity edema R60.0    Habitual self-excoriation F42.4    Ulcer of left lower leg, limited to breakdown of skin ([facility]) L97.921    Ulcer of right leg, limited to breakdown of skin ([facility]) L97.911    Arthritis M19.90    Cardiomyopathy ([facility]) I42.9    Chronic systolic CHF (congestive heart failure) ([facility]) I50.22    Chronic renal impairment N18.9    Peripheral venous insufficiency I87.2    PAD (peripheral artery disease) ([facility]) I73.9    Acute kidney injury superimposed on CKD ([facility]) N17.9, N18.9    Morbid obesity with BMI of 40.0-44.9, adult ([facility]) E66.01, Z68.41    CKD (chronic kidney disease) stage 4, GFR 15-29 ml/min ([facility]) N18.4    Hypokalemia E87.6    Dyspnea on exertion R06.00    Ischemic heart disease I25.9    Moderate protein-calorie malnutrition ([facility]) E44.0    DKA, type 2, not at goal [facility]) E11.10    Acute on chronic systolic CHF (congestive heart failure) ([facility]) Y98.43    Acute systolic CHF (congestive heart failure) ([facility]) I50.21    CHF (congestive heart failure), NYHA class I, acute on chronic, combined ([facility]) I50.43    Cellulitis L03.90    Closed nondisplaced fracture of navicular bone of left foot S92.255A    Heart failure ([facility]) I50.9       Isolation/Infection:   Isolation            No Isolation          Patient Infection Status       Infection Onset Added Last Indicated Last Indicated By Review Planned Expiration Resolved Resolved By    None active    Resolved    COVID-19 Rule Out 01/11/21 01/11/21 01/11/21 COVID-19 (Ordered)   01/11/21 Rule-Out Test Resulted    COVID-19 Rule Out 12/21/20 12/21/20 12/21/20 COVID-19 (Ordered)   12/22/20 Rule-Out Test Resulted    COVID-19 Rule Out 11/30/20 11/30/20 11/30/20 COVID-19 (Ordered)   11/30/20 Rule-Out Test Resulted            Nurse Assessment:  Last Vital Signs: /77   Pulse 73   Temp 98.8 °F (37.1 °C) (Oral)   Resp 18   Ht 5' 4\" (1.626 m)   Wt 248 lb 0.3 oz (112.5 kg)   LMP 10/24/2012   SpO2 99%   BMI 42.57 kg/m²     Last documented pain score (0-10 scale): Pain Level: 0  Last Weight:   Wt Readings from Last 1 Encounters:   06/23/21 248 lb 0.3 oz (112.5 kg)     Mental Status:  oriented and alert    IV Access:  - None    Nursing Mobility/ADLs:  Walking   Assisted  Transfer  Assisted  Bathing  Assisted  Dressing  Assisted  Toileting  Assisted  Feeding  Assisted  Med Admin  Assisted  Med Delivery   whole    Wound Care Documentation and Therapy:  Wound 01/11/21 Chest (Active)   Number of days: 162       Wound 04/22/21 Leg Left wounds in clusters. (Active)   Number of days: 62       Wound 04/22/21 Toe (Comment  which one) Left cluster tip of 1 + 2, dorsal on toe 3 brown and yellow slough. (Active)   Number of days: 62       Wound 04/22/21 Leg Right (Active)   Number of days: 62        Elimination:  Continence:   · Bowel: No  · Bladder: No  Urinary Catheter: None   Colostomy/Ileostomy/Ileal Conduit: No       Date of Last BM:     Intake/Output Summary (Last 24 hours) at 6/23/2021 1057  Last data filed at 6/23/2021 1021  Gross per 24 hour   Intake 1809 ml   Output 2501 ml   Net -692 ml     I/O last 3 completed shifts: In: 1849 [P.O.:1800;  I.V.:49]  Out: 6256 [Urine:2500; Stool:1]    Safety Concerns: At Risk for Falls    Impairments/Disabilities:      Vision    Nutrition Therapy:  Current Nutrition Therapy:   - Oral Diet:  General    Routes of Feeding: Oral  Liquids: Thin Liquids  Daily Fluid Restriction: 1500 ml fluid   Last Modified Barium Swallow with Video (Video Swallowing Test): not done    Treatments at the Time of Hospital Discharge:   Respiratory Treatments: ***  Oxygen Therapy:  is not on home oxygen therapy.   Ventilator:    - No ventilator support    Rehab Therapies: Physical Therapy and Occupational Therapy  Weight Bearing Status/Restrictions: Non-weight bearing on left leg  Other Medical Equipment (for information only, NOT a DME order):  wheelchair  Other Treatments: ***    Patient's personal belongings (please select all that are sent with patient):  None    RN SIGNATURE:  Electronically signed by Jesica Burger RN on 7/1/21 at 2:51 PM EDT    CASE MANAGEMENT/SOCIAL WORK SECTION    Inpatient Status Date: 06/21/2021    Readmission Risk Assessment Score:  Readmission Risk              Risk of Unplanned Readmission:  50           Discharging to Facility/ Agency                                · Dialysis Facility (if applicable) NAName:  · Address:  · Dialysis Schedule:  · Phone:  · Fax:    / signature: {Esignature:[id number]:::0}    PHYSICIAN SECTION    Prognosis: {Prognosis:5922783390:::0}    Condition at Discharge: Rika Pinon Patient Condition:945587974:::0}    Rehab Potential (if transferring to Rehab): {Prognosis:4123973459:::0}    Recommended Labs or Other Treatments After Discharge: ***    Podiatry Wound Care Discharge Instructions  Please apply betadine to the wounds on both legs, feet, and toes daily  Patient is non weightbearing Left lower extremity  Please follow-up with your previous podiatrist or with Dr. Maricruz Hardin DPM for wound recheck within 1 week of hospital discharge    Physician Certification: I certify the above information and transfer of Leandra Simon  is necessary for the continuing treatment of the diagnosis listed and that she requires {Admit to Appropriate Level of Care:66802:::0} for {GREATER/LESS:674941042} 30 days.      Update Admission H&P: {CHP DME Changes in HandP:827167449:::0}    PHYSICIAN SIGNATURE:  {Esignature:[id number]:::0}

## 2021-06-23 NOTE — CARE COORDINATION
Sw Following, Pt from home w/family, Pt currently plans to return home at DC and denies any DC needs, Pt's family will transport.  SW will continue to follow and monitor for DC needs, PT/OT recs pending    Electronically signed by KATIE Echavarria, TIRSOW on 6/23/2021 at 4:11 [phone]

## 2021-06-23 NOTE — PROGRESS NOTES
Nephrology Progress Note  [phone]   [email or web address].        Reason for Consult:  Hypokalemia, CKD     HISTORY OF PRESENT ILLNESS:                This is a patient with a significant past medical history of CAD, COPD, CHF, DM, GERD, HTN, HLD and PVD who has had multiple admissions in the hospital because of decompensated HF. Patient has a known history of CKD stage 3 with a baseline serum creatinine of 1.3-1.6mg/dL. Also recurrent SHAUNNA episodes secondary to cardiorenal syndrome. She is known to have had a renal artery stent in the past as well. Last admission, her serum creatinine went up as high as 4.1mg/dL but improved down to 1.6mg/dL on discharge with diuresis. She presents with dyspnea. Her Cr was 2.2 on 6/17 now 1.7 ,she has cardiorenal syndrome, now has AFib and was on lasix gtt but held when K noted to be very low. Review of Systems: less dyspnea, denies pain     PHYSICAL EXAM:    Vitals:    /77   Pulse 73   Temp 98.8 °F (37.1 °C) (Oral)   Resp 18   Ht 5' 4\" (1.626 m)   Wt 248 lb 0.3 oz (112.5 kg)   LMP 10/24/2012   SpO2 95%   BMI 42.57 kg/m²   I/O last 3 completed shifts: In: 1849 [P.O.:1800; I.V.:49]  Out: 2501 [Urine:2500; Stool:1]  No intake/output data recorded. Physical Exam:  Gen: Resting in bed, NAD. HEENT: MMM, OP clear. CV: IRR no m/r/g. No S3.  Lungs: Good respiratory effort, clear air entry   Abd: S/NT +BS  Ext: + LE  edema, no cyanosis  Skin: Warm. No rashes appreciated. : No TTP over bladder, nondistended. Neuro: Alert and oriented x 3, nonfocal.  Joints: No erythema noted over joints.     DATA:    CBC:   Lab Results   Component Value Date    WBC 7.1 06/23/2021    RBC 4.73 06/23/2021    HGB 13.8 06/23/2021    HCT 41.7 06/23/2021    MCV 88.1 06/23/2021    MCH 29.1 06/23/2021    MCHC 33.1 06/23/2021    RDW 16.4 06/23/2021     06/23/2021    MPV 8.5 06/23/2021     BMP:    Lab Results   Component Value Date     06/22/2021    K 3.9 06/22/2021    CL 85 06/22/2021    CO2 29 06/22/2021    BUN 65 06/22/2021    LABALBU 4.0 06/21/2021    CREATININE 1.7 06/22/2021    CALCIUM 9.0 06/22/2021    GFRAA 37 06/22/2021    LABGLOM 31 06/22/2021    GLUCOSE 198 06/22/2021       IMPRESSION/RECOMMENDATIONS:      1. SHAUNNA/CKD in setting of CRS and dependence on diuretics hold lasix today but will need to resume  in next day or so, no labs available  yet today   2. Replaced K  3. Increase Aldactone dose to 50 mg BID   4. Allergic to ACEi   5. Afib per cardiology plans as noted    6. Hyponatremia this is chronic per old records baseline is 80- 128, may have an element of SIADH by prior work up     Thank you for allowing me to participate in the care of this patient. I will continue to follow along. Please call with questions.     Damian Gusman MD, MD

## 2021-06-23 NOTE — PROGRESS NOTES
Podiatric Surgery Daily Progress Note  Francia Bautista      Subjective :   Patient seen and examined this am at the bedside. Patient denies any acute overnight events. Patient denies N/V/F/C/SOB. Patient denies calf pain, thigh pain, chest pain. Review of Systems: A 12 point review of symptoms is unremarkable with the exception of the chief complaint. Patient specifically denies nausea, fever, vomiting, chills, shortness of breath, chest pain, abdominal pain, constipation or difficulty urinating. Objective     /77   Pulse 73   Temp 98.8 °F (37.1 °C) (Oral)   Resp 18   Ht 5' 4\" (1.626 m)   Wt 248 lb 0.3 oz (112.5 kg)   LMP 10/24/2012   SpO2 99%   BMI 42.57 kg/m²      I/O:    Intake/Output Summary (Last 24 hours) at 6/23/2021 1041  Last data filed at 6/23/2021 1021  Gross per 24 hour   Intake 1809 ml   Output 2501 ml   Net -692 ml              Wt Readings from Last 3 Encounters:   06/23/21 248 lb 0.3 oz (112.5 kg)   06/21/21 245 lb (111.1 kg)   06/17/21 245 lb (111.1 kg)       LABS:    Recent Labs     06/22/21  0448 06/23/21  0649   WBC 9.9 7.1   HGB 12.9 13.8   HCT 38.7 41.7    285        Recent Labs     06/22/21  1232   *   K 3.9   CL 85*   CO2 29   BUN 65*   CREATININE 1.7*        Recent Labs     06/21/21  1030 06/22/21  1910   PROT 7.2  --    INR  --  1.16*   APTT  --  29.4           LOWER EXTREMITY EXAMINATION    Dressing to left LE intact. No strikethrough noted to the external dressing. No drainage noted to the internal layers of the dressing. VASCULAR: DP and PT pulses nonpalpable bilateral.  Upon hand-held Doppler examination, DP and PT signals monophasic bilateral.  CFT is sluggish to the digits of the foot b/l. Skin temperature is warm to cool from proximal to distal with no focal calor noted. +3 pitting edema noted bilateral lower extremity. No pain with calf compression b/l.     NEUROLOGIC: Gross and epicritic sensation is diminished b/l.  Protective sensation is absent at all pedal sites b/l.     DERMATOLOGIC: Diffuse dermatologic changes noted to bilateral lower extremity. Dependent rubor noted to bilateral lower extremity. Multiple full-thickness ulcerations noted to bilateral lower extremity 2/2 combination of arterial and venous insufficiency. No purulent drainage, malodor, erythema, fluctuance, or crepitus noted. Wounds do not probe to bone, tunnel, or track. Right lower extremity: Second digit noted to have dusky appearance. Left lower extremity: Distal aspects of digits 1 through 5 with ischemic changes. MUSCULOSKELETAL: Muscle strength is 5/5 for all pedal groups tested. No pain with palpation of the foot or ankle b/l. Ankle joint ROM is decreased in dorsiflexion with the knee extended. No obvious biomechanical abnormalities. IMAGING:  X-ray tibia-fibula right 6/22/2021  FINDINGS: Diffuse soft tissue swelling. No osseous erosion. No soft tissue gas. Metallic clips consistent with previous vascular surgery.           Impression   1. Diffuse soft tissue swelling without acute osseous abnormality.             X-ray ankle right 6/22/2021  FINDINGS: Soft tissue swelling. No soft tissue gas identified. No periosteal reaction to suggest osteomyelitis.           Impression   1. Soft tissue swelling without significant osseous abnormality otherwise.             X-ray foot right 6/22/2021  FINDINGS: Mineralization appears intact. No acute fracture or dislocation. Moderate calcaneal enthesopathy. First MTP osteoarthritis. Diffuse soft tissue swelling along the distal foot. No convincing evidence of soft tissue gas or osseous erosion.           Impression   1. Soft tissue swelling without acute osseous abnormality. 2. Calcaneal enthesopathy. 3. Polyarticular osteoarthritis.           X-ray tibia-fibula left 6/22/2021  FINDINGS:       No evidence of acute fracture or traumatic bony abnormality is seen.       No gross bony destructive changes are seen.     There is suggestion of diffuse subcutaneous edema which appears similar to prior study.       Ulcer is seen anteriorly in the soft tissues overlying the distal tibial shaft. This is unchanged.           Impression       No evidence of fracture or bony abnormality seen.             X-ray ankle left 6/22/2021  FINDINGS: There appears be an ulceration along the anterior aspect of the lower calf. Extensive calcaneal enthesopathy. Midfoot osteoarthritis with erosive changes and fragmentation of the navicular bone suggesting neuropathic change.           Impression   1. Ulceration of the distal calf with diffuse soft tissue swelling. No evidence for underlying osteomyelitis. 2. Neuropathic change in the midfoot. 3. Calcaneal enthesopathy.             X-ray foot left 6/22/2021  FINDINGS: Diffuse soft tissue swelling over the distal foot. There is fragmentation of the navicular bone and erosive change in the distal talus suggesting neuropathic change. Calcaneal enthesopathy. No soft tissue gas identified. No convincing evidence    of osteomyelitis.           Impression   1. Soft tissue swelling and evidence of neuropathic change in the midfoot. No convincing evidence of osteomyelitis or acute osseous abnormality.             Arterial duplex bilateral lower extremity 6/18/2021  Impressions   Right Impression   The right ankle/brachial index is 0.9 (the DP is 90 and the PT is 100 mmHg). The common femoral artery demonstrates multiphasic flow indicating no   significant aortic-iliac inflow disease. There is a patent proximal SFA - popliteal bypass graft. However, due to body   habitus and depth of vessels, graft was difficult to image. There is abnormal monophasic flow demonstrated in the posterior tibial artery,   anterior tibial artery and peroneal artery. There is atherosclerotic plaque seen within the common femoral artery,   popliteal artery and calf arteries consistent with <50% stenosis.    Left Impression   The left ankle/brachial index is 0.6 (the DP is 60 and the PT is non-audible   mmHg). The common femoral artery demonstrates abnormal monophasic flow indicating   significant aortic-iliac inflow disease. The proximal - distal SFA appears occluded with a non functioning stent. There is abnormal monophasic flow demonstrated in the popliteal artery,   posterior tibial artery and anterior tibial artery. The peroneal artery appears occluded. There is atherosclerotic plaque seen within the common femoral artery, prox -   distal SFA, popliteal artery and calf arteries. Previous exam on 06/26/2020, right ZACH: 0.9 left ZACH: 0.8.       Conclusions        Summary        Normal right ZACH.    Abnormal left ZACH in the range of moderate arterial insufficiency.        Patent right femoropopliteal bypass with distal graft velocity below the    threshold associated with threatened graft patency. No identified graft    abnormality.        Occluded L SFA associated with stent occlusion.          ASSESSMENT/PLAN  -Multiple full-thickness ulcerations, bilateral lower extremity, likely 2/2 combination of arterial and venous insufficiency  -Peripheral arterial disease, bilateral lower extremity  -Venous insufficiency, bilateral lower extremity  -Diabetes mellitus type 2 with peripheral neuropathy, bilateral lower extremity  -Charcot neuroarthropathy versus other source of neuropathic changes, left foot     -Patient examined and evaluated at the bedside   -VSS. No leukocytosis noted. -CRP 48.8, ESR 41  -X-rays reviewed and impression noted above  -Arterial duplex reviewed and impression noted above  -Vascular consult ordered; patient to follow-up with  on 6/25  -Debridement deferred due to peripheral arterial disease  -Betadine applied to distal digits left foot and wounds b/l LE  -Antibiotics not indicated at this time from podiatric standpoint  -Nonweightbearing left leg.   Weightbearing as tolerated right leg.  -Discussed Charcot neuroarthropathy with patient left foot resting importance of being nonweightbearing to left leg. Discussed with patient that SNF stay may be required if she is unable to remain nonweightbearing to the left leg until she is fitted for a Almanzar Gordon. Patient understands but is questionable of SNF at this time. -PT/OT ordered       DISPO: Multiple full-thickness ulcerations, bilateral lower extremity, likely 2/2 combination of arterial and venous insufficiency. No signs of acute infection at this time. Vascular consult; patient to follow-up outpatient with Dr. Nickie Solorzano. No surgical intervention planned from podiatric standpoint at this time. Ordered PT/OT; will follow-up results.     Patient examined and evaluated at the bedside with Dr. Natacha Ortega DPM.    Denzel Swenson DPM  06/23/21  10:41 AM

## 2021-06-24 NOTE — PROGRESS NOTES
Hospitalist Progress Note      PCP: Leavy Kussmaul, PA-C    Date of Admission: 6/21/2021    Chief Complaint: SOB    Hospital Course: Patient is 78-year-old female with history of CAD, CABG x1, ischemic cardiomyopathy, CHF s/p CardioMEMS implant 2/2019, COPD, PAD, CKD stage III presented to [facility] ER with complaint of shortness of breath and weight gain of 30 pounds. Patient reported that she has been noncompliant with her diet having a lot of salt in food. Patient was noted to be in A. fib with RVR on telemetry. Podiatry was consulted for chronic venous ulcers. Patient had abnormal artery duplex vascular surgery was consulted recommended Follow-up with Dr. Mi Rodríguez as an outpatient    Subjective    Patient seen and examined today reports her breathing is better. She is off of oxygen. She was noted to have low sodium Lasix drip was stopped. Patient was noted to be hypoglycemic last night event she was off of Lantus. I will stop bedtime Humalog. Currently in normal sinus rhythm. Patient is -3 L since admission.     Medications:  Reviewed    Infusion Medications    [Held by provider] furosemide (LASIX) 1mg/ml infusion 5 mg/hr (06/23/21 0926)    dextrose       Scheduled Medications    tolvaptan  15 mg Oral Once    potassium chloride  40 mEq Oral BID WC    spironolactone  50 mg Oral BID    budesonide  0.5 mg Nebulization BID    And    Arformoterol Tartrate  15 mcg Nebulization BID    metoprolol tartrate  25 mg Oral BID    ARIPiprazole  15 mg Oral Daily    benztropine  1 mg Oral Nightly    busPIRone  30 mg Oral BID    doxepin  25 mg Oral Nightly    lamoTRIgine  200 mg Oral BID    methocarbamol  500 mg Oral BID    [Held by provider] insulin glargine  30 Units Subcutaneous Nightly    insulin lispro  0-12 Units Subcutaneous TID WC    apixaban  5 mg Oral BID    atorvastatin  80 mg Oral Nightly    clopidogrel  75 mg Oral Daily    pantoprazole  40 mg Oral BID AC     PRN Meds: appears to be baseline  Nephrology consulted. #Diabetes mellitus type 2  Hold Lantus and bedtime Humalog due to hypoglycemia. Continue insulin sliding scale. HbA1c 11.9% 5/20/2021    #CAD  #CABG x1. Continue aspirin, Plavix, statin, beta-blocker. #Chronic venous ulcers  Podiatry consulted recs no intervention      DVT Prophylaxis: Heparin  Diet: Adult Oral Nutrition Supplement; Protein Modular  ADULT DIET; Regular; Low Sodium (2 gm)  Code Status: Prior    PT/OT Eval Status: Consulted. Dispo - inpatient. Diuresis.  D/C in 2-3 days    This chart was likely completed using voice recognition technology and may contain unintended grammatical , phraseology,and/or punctuation errors      Lois Friedman MD

## 2021-06-24 NOTE — PROGRESS NOTES
Podiatric Surgery Daily Progress Note  Lesley Bautista      Subjective :   Patient seen and examined this am at the bedside. Patient denies any acute overnight events. Patient denies N/V/F/C/SOB. Patient denies calf pain, thigh pain, chest pain. Review of Systems: A 12 point review of symptoms is unremarkable with the exception of the chief complaint. Patient specifically denies nausea, fever, vomiting, chills, shortness of breath, chest pain, abdominal pain, constipation or difficulty urinating. Objective     /64   Pulse 90   Temp 97.7 °F (36.5 °C) (Oral)   Resp 17   Ht 5' 4\" (1.626 m)   Wt 247 lb 9.2 oz (112.3 kg)   LMP 10/24/2012   SpO2 98%   BMI 42.50 kg/m²      I/O:    Intake/Output Summary (Last 24 hours) at 6/24/2021 0922  Last data filed at 6/24/2021 0550  Gross per 24 hour   Intake 1345 ml   Output 2075 ml   Net -730 ml              Wt Readings from Last 3 Encounters:   06/24/21 247 lb 9.2 oz (112.3 kg)   06/21/21 245 lb (111.1 kg)   06/17/21 245 lb (111.1 kg)       LABS:    Recent Labs     06/22/21  0448 06/23/21  0649   WBC 9.9 7.1   HGB 12.9 13.8   HCT 38.7 41.7    285        Recent Labs     06/23/21  1744   *   K 3.2*   CL 77*   CO2 29   BUN 67*   CREATININE 1.8*        Recent Labs     06/21/21  1030 06/22/21  1910   PROT 7.2  --    INR  --  1.16*   APTT  --  29.4           LOWER EXTREMITY EXAMINATION    Dressing to left LE intact. No strikethrough noted to the external dressing. No drainage noted to the internal layers of the dressing. VASCULAR: DP and PT pulses nonpalpable bilateral.  Upon hand-held Doppler examination, DP and PT signals monophasic bilateral.  CFT is sluggish to the digits of the foot b/l. Skin temperature is warm to cool from proximal to distal with no focal calor noted. +3 pitting edema noted bilateral lower extremity. No pain with calf compression b/l.     NEUROLOGIC: Gross and epicritic sensation is diminished b/l.  Protective sensation is absent at all pedal sites b/l.     DERMATOLOGIC: Diffuse dermatologic changes noted to bilateral lower extremity. Dependent rubor noted to bilateral lower extremity. Multiple full-thickness ulcerations noted to bilateral lower extremity 2/2 combination of arterial and venous insufficiency. No purulent drainage, malodor, erythema, fluctuance, or crepitus noted. Wounds do not probe to bone, tunnel, or track. Right lower extremity: Second digit noted to have dusky appearance. Left lower extremity: Distal aspects of digits 1 through 5 with ischemic changes. MUSCULOSKELETAL: Muscle strength is 5/5 for all pedal groups tested. No pain with palpation of the foot or ankle b/l. Ankle joint ROM is decreased in dorsiflexion with the knee extended. No obvious biomechanical abnormalities. IMAGING:  X-ray tibia-fibula right 6/22/2021  FINDINGS: Diffuse soft tissue swelling. No osseous erosion. No soft tissue gas. Metallic clips consistent with previous vascular surgery.           Impression   1. Diffuse soft tissue swelling without acute osseous abnormality.             X-ray ankle right 6/22/2021  FINDINGS: Soft tissue swelling. No soft tissue gas identified. No periosteal reaction to suggest osteomyelitis.           Impression   1. Soft tissue swelling without significant osseous abnormality otherwise.             X-ray foot right 6/22/2021  FINDINGS: Mineralization appears intact. No acute fracture or dislocation. Moderate calcaneal enthesopathy. First MTP osteoarthritis. Diffuse soft tissue swelling along the distal foot. No convincing evidence of soft tissue gas or osseous erosion.           Impression   1. Soft tissue swelling without acute osseous abnormality. 2. Calcaneal enthesopathy. 3. Polyarticular osteoarthritis.           X-ray tibia-fibula left 6/22/2021  FINDINGS:       No evidence of acute fracture or traumatic bony abnormality is seen.       No gross bony destructive changes are seen.     There is suggestion of diffuse subcutaneous edema which appears similar to prior study.       Ulcer is seen anteriorly in the soft tissues overlying the distal tibial shaft. This is unchanged.           Impression       No evidence of fracture or bony abnormality seen.             X-ray ankle left 6/22/2021  FINDINGS: There appears be an ulceration along the anterior aspect of the lower calf. Extensive calcaneal enthesopathy. Midfoot osteoarthritis with erosive changes and fragmentation of the navicular bone suggesting neuropathic change.           Impression   1. Ulceration of the distal calf with diffuse soft tissue swelling. No evidence for underlying osteomyelitis. 2. Neuropathic change in the midfoot. 3. Calcaneal enthesopathy.             X-ray foot left 6/22/2021  FINDINGS: Diffuse soft tissue swelling over the distal foot. There is fragmentation of the navicular bone and erosive change in the distal talus suggesting neuropathic change. Calcaneal enthesopathy. No soft tissue gas identified. No convincing evidence    of osteomyelitis.           Impression   1. Soft tissue swelling and evidence of neuropathic change in the midfoot. No convincing evidence of osteomyelitis or acute osseous abnormality.             Arterial duplex bilateral lower extremity 6/18/2021  Impressions   Right Impression   The right ankle/brachial index is 0.9 (the DP is 90 and the PT is 100 mmHg). The common femoral artery demonstrates multiphasic flow indicating no   significant aortic-iliac inflow disease. There is a patent proximal SFA - popliteal bypass graft. However, due to body   habitus and depth of vessels, graft was difficult to image. There is abnormal monophasic flow demonstrated in the posterior tibial artery,   anterior tibial artery and peroneal artery. There is atherosclerotic plaque seen within the common femoral artery,   popliteal artery and calf arteries consistent with <50% stenosis.    Left Impression   The left ankle/brachial index is 0.6 (the DP is 60 and the PT is non-audible   mmHg). The common femoral artery demonstrates abnormal monophasic flow indicating   significant aortic-iliac inflow disease. The proximal - distal SFA appears occluded with a non functioning stent. There is abnormal monophasic flow demonstrated in the popliteal artery,   posterior tibial artery and anterior tibial artery. The peroneal artery appears occluded. There is atherosclerotic plaque seen within the common femoral artery, prox -   distal SFA, popliteal artery and calf arteries. Previous exam on 06/26/2020, right ZACH: 0.9 left ZACH: 0.8.       Conclusions        Summary        Normal right ZACH.    Abnormal left ZACH in the range of moderate arterial insufficiency.        Patent right femoropopliteal bypass with distal graft velocity below the    threshold associated with threatened graft patency. No identified graft    abnormality.        Occluded L SFA associated with stent occlusion.          ASSESSMENT/PLAN  -Multiple full-thickness ulcerations, bilateral lower extremity, likely 2/2 combination of arterial and venous insufficiency  -Peripheral arterial disease, bilateral lower extremity  -Venous insufficiency, bilateral lower extremity  -Diabetes mellitus type 2 with peripheral neuropathy, bilateral lower extremity  -Charcot neuroarthropathy, left foot     -Patient examined and evaluated at the bedside   -VSS. No leukocytosis noted. -CRP 48.8, ESR 41  -X-rays reviewed and impression noted above  -Arterial duplex reviewed and impression noted above  -Vascular consult ordered; patient to follow-up with  on 6/25  -Debridement deferred due to peripheral arterial disease  -Betadine applied to distal digits left foot and wounds b/l LE  -Dressing applied to left leg consisting of gauze, Kerlix, tape  -Antibiotics not indicated at this time from podiatric standpoint  -Nonweightbearing left leg. Weightbearing as tolerated right leg.  -Discussed Charcot neuroarthropathy left foot with patient stressing importance of being nonweightbearing to left leg. Discussed with patient that SNF stay may be required if she is unable to remain nonweightbearing to the left leg until she is fitted for a Almanzar Howard. Patient understands but is questionable of SNF at this time. -PT/OT ordered       DISPO: Multiple full-thickness ulcerations, bilateral lower extremity, likely 2/2 combination of arterial and venous insufficiency. No signs of acute infection at this time. Vascular consult; follow-up outpatient with Dr. Deepthi Garcia. No surgical intervention planned from podiatric standpoint at this time. Ordered PT/OT; will follow-up results.     Patient assessment and plan discussed with Dr. Aida Howe DPM.    Tea Jiménez DPM  06/24/21  9:22 AM

## 2021-06-24 NOTE — PROGRESS NOTES
Pt stated that her left leg dressing came off during the day. Pt's left leg wrapped with abd pads, gauze and kerlix per order to nursing per Dr. Silvino Cooney stayed in place overnight.

## 2021-06-24 NOTE — PROGRESS NOTES
Nephrology Progress Note  [phone]   [email or web address]        Reason for Consult:  Hypokalemia, CKD     HISTORY OF PRESENT ILLNESS:                This is a patient with a significant past medical history of CAD, COPD, CHF, DM, GERD, HTN, HLD and PVD who has had multiple admissions in the hospital because of decompensated HF. Patient has a known history of CKD stage 3 with a baseline serum creatinine of 1.3-1.6mg/dL. Also recurrent SHAUNNA episodes secondary to cardiorenal syndrome. She is known to have had a renal artery stent in the past as well. Last admission, her serum creatinine went up as high as 4.1mg/dL but improved down to 1.6mg/dL on discharge with diuresis. She presents with dyspnea. Her Cr was 2.2 on 6/17 now 1.7-1.9 ,she has cardiorenal syndrome, now has AFib as new onset. Overall breathing better ,Na has dropped     Review of Systems: less dyspnea, denies pain     PHYSICAL EXAM:    Vitals:    /64   Pulse 90   Temp 97.7 °F (36.5 °C) (Oral)   Resp 17   Ht 5' 4\" (1.626 m)   Wt 247 lb 9.2 oz (112.3 kg)   LMP 10/24/2012   SpO2 98%   BMI 42.50 kg/m²   I/O last 3 completed shifts: In: 4777 [P.O.:1335; I.V.:10]  Out: 2075 [Urine:2075]  I/O this shift: In: 5 [P.O.:420]  Out: -     Physical Exam:  Gen: Resting in bed, NAD. HEENT: MMM, OP clear. CV: IRR no m/r/g. No S3.  Lungs: Good respiratory effort, clear air entry   Abd: S/NT +BS  Ext: + LE  edema, no cyanosis  Skin: Warm. No rashes appreciated. : No TTP over bladder, nondistended. Neuro: Alert and oriented x 3, nonfocal.  Joints: No erythema noted over joints.     DATA:    CBC:   Lab Results   Component Value Date    WBC 8.3 06/24/2021    RBC 4.62 06/24/2021    HGB 13.2 06/24/2021    HCT 40.8 06/24/2021    MCV 88.3 06/24/2021    MCH 28.6 06/24/2021    MCHC 32.4 06/24/2021    RDW 16.3 06/24/2021     06/24/2021    MPV 8.8 06/24/2021     BMP:    Lab Results   Component Value Date     06/24/2021    K 3.5 06/24/2021    CL 80 06/24/2021    CO2 30 06/24/2021    BUN 70 06/24/2021    LABALBU 4.0 06/21/2021    CREATININE 1.9 06/24/2021    CALCIUM 9.2 06/24/2021    GFRAA 33 06/24/2021    LABGLOM 27 06/24/2021    GLUCOSE 151 06/24/2021       IMPRESSION/RECOMMENDATIONS:      1. SHAUNNA/CKD in setting of CRS and dependence on diuretics hold lasix for now  2. Add Samsca today   3. Replaced K  4. Increase Aldactone dose to 50 mg BID   5. Avoid  ACEi/ARB for now    6. Afib per cardiology plans as noted B blocker    7. Hyponatremia this is chronic per old records baseline is 80- 128, may have an element of SIADH by prior work up     Thank you for allowing me to participate in the care of this patient. I will continue to follow along. Please call with questions.     Eddie Abraham MD, MD

## 2021-06-24 NOTE — PROGRESS NOTES
Physical Therapy    Facility/Department: [address]  Initial Assessment and Treatment    NAME: Giancarlo Wang  : 1965  MRN: [id number]    Date of Service: 2021    Discharge Recommendations:  Giancarlo Wang scored a  on the AM-PAC short mobility form. Current research shows that an AM-PAC score of 17 or less is typically not associated with a discharge to the patient's home setting. Based on the patient's AM-PAC score and their current functional mobility deficits, it is recommended that the patient have 3-5 sessions per week of Physical Therapy at d/c to increase the patient's independence. Please see assessment section for further patient specific details. PT Equipment Recommendations  Equipment Needed: No    Assessment   Assessment: Pt from home with heart failure. Pt now NWB L foot per Podiatry. Pt is unable to maintain NWB status for sit to stand or bed to chair transfer despite effort and use of walker. If pt is to maintain NWB, recommend continued skilled PT at d/c to practice transfer techniques and maximize functional independence. If pt returns home despite recommendation, pt would need Home PT, w/c to maintain WB status and medical transport home due to steps. Will continue to follow. Treatment Diagnosis: Decreased gait due to heart failure  Decision Making: Medium Complexity  Patient Education: Role of PT and WB status. Pt verbalized partial undertanding and would benefit from reinforcement. REQUIRES PT FOLLOW UP: Yes         Restrictions  Other position/activity restrictions: NWB L leg     Vision/Hearing  Vision Exceptions: Wears glasses for reading  Hearing: Within functional limits       Subjective  Chart Reviewed: Yes  Additional Pertinent Hx: Pt to [facility] ED  with shortness of breath. Pt admitted to [facility] for heart failure. CXR (-). Podiatry consult for LE wounds. Imaging (-) B LEs for ankle fx/osteomyelitis. NWB L LE until pt gets a [place]. PMH:  CHF, OA, asthma, CAD, COPD, DM, ETOH use, MI, CABG  Diagnosis: Heart Failure    Subjective  Subjective: Pt found supine in bed. \"I can't walk on this leg. \"  Pain Screening  Patient Currently in Pain: Yes (8/10 pain in foot; pt has been medicated per her report; RN aware)    Orientation  Within Functional Limits    Social/Functional History  Lives With: Friend(s) (male friend)  Type of Home: Trailer  Home Layout: One level  Home Access: Stairs to enter with rails  Entrance Stairs - Number of Steps: 5  Entrance Stairs - Rails: Both  Bathroom Shower/Tub: Tub/Shower unit  Bathroom Toilet: Standard (sink nearby to push from)  [employer]: Tub transfer bench, Shower chair  Bathroom Accessibility: Accessible  Home Equipment: Rolling walker, 4 wheeled walker, [employer] Help From: Friend(s) (only if needed- typically independent)  ADL Assistance: [facility]: Independent  Homemaking Responsibilities:  (Pt folds laundry, does some cooking, her own grocery shopping with a motorized cart, friend helps clean)  Ambulation Assistance: Independent  Transfer Assistance: Independent  Active :  Yes  Additional Comments: pts male friend whom she lives with will be around to provide initial 24hr supervision/asssitance to patient; pts daughter lives 5 min away and will be around to check in on pt      Objective  Strength  Strength RLE: [facility]  Strength LLE: WFL    Bed mobility  Supine to Sit: Modified independent (use of rail)     Transfers  Sit to Stand: Minimal Assistance (from bed to walker, unable to maintain NWB L LE despite effort)  Stand to sit: Minimal Assistance (decreased control to sit, unable to maintain NWB LLE)  Stand Pivot Transfers: Minimal Assistance (bed <> chair using walker, unable to maintain NWB LLE)     Ambulation  Device: Rolling Walker  Assistance: Minimal assistance  Quality of Gait: Attempting to unweight LLE but unable to maintain strict NWB status  Distance: 3 steps to chair    Balance  Sitting - Static: Good  Sitting - Dynamic: Good  Standing - Static: Fair (CGA at walker, cues for NWB status)  Standing - Dynamic: Poor (Min A for transfers with walker, unable to maintain NWB status)    Treatment included gait and transfer training with patient education     Plan   Times per week: 2-5  Current Treatment Recommendations: Transfer Training, Functional Mobility Training, Strengthening      Safety Devices  Type of devices: Left in chair, Chair alarm in place, Call light within reach, Nurse notified       AM-PAC Score  AM-PAC Inpatient Mobility Raw Score : 11 (06/24/21 1131)  AM-PAC Inpatient T-Scale Score : 33.86 (06/24/21 1131)  Mobility Inpatient CMS 0-100% Score: 72.57 (06/24/21 1131)  Mobility Inpatient CMS G-Code Modifier : CL (06/24/21 1131)    Goals  Short term goals  Time Frame for Short term goals: Discharge  Short term goal 1: sit <> stand CGA with keeping NWB LLE  Short term goal 2: stand pivot transfers CGA with keeping NWB LLE  Patient Goals   Patient goals : Return home       Therapy Time   Individual Concurrent Group Co-treatment   Time In 1005         Time Out 1044         Minutes 39         Timed Code Treatment Minutes:  25  Total Treatment Minutes:  Alla 35, PT

## 2021-06-24 NOTE — PROGRESS NOTES
Occupational Therapy   Occupational Therapy Initial Assessment/Tx  Date: 2021   Patient Name: Radha Puente  MRN: [id number]     : 1965    Date of Service: 2021    Discharge Recommendations: Radha Puente scored a 15/24 on the AM-PAC ADL Inpatient form. Current research shows that an AM-PAC score of 17 or less is typically not associated with a discharge to the patient's home setting. Based on the patient's AM-PAC score and their current ADL deficits, it is recommended that the patient have 3-5 sessions per week of Occupational Therapy at d/c to increase the patient's independence. Please see assessment section for further patient specific details. If patient discharges prior to next session this note will serve as a discharge summary. Please see below for the latest assessment towards goals. OT Equipment Recommendations  Other: has recommended TTB and 3-in-1 commode; would need wheelchair if home and would also need medical transport    Assessment   Performance deficits / Impairments: Decreased functional mobility ; Decreased ADL status; Decreased endurance;Decreased safe awareness  Assessment: Pt presented [facility] from home with friend and is typically highly independent at baseline. Pt now below this functional baseline 2/2 NWB status on LLE. Pt required min A w/2WW to perform functional transfers and stand-step transfer, however, was unable to maintain NWB status throughout. Pt also unable to perform NWB status for LB dressing. Pt would benefit from ongoing inpatient therapy services at discharge to maximize functional independence, strength, and compliance with NWB status. Will cont to follow on acute OT POC.   Treatment Diagnosis: decreased independence with ADLs and fx mobility secondary to NWB status on LLE  Decision Making: Medium Complexity  OT Education: OT Role;Plan of Care;ADL Adaptive Strategies;Transfer Training;Equipment;Precautions  Patient Education: pt verb understanding; requires inforcement as needed  REQUIRES OT FOLLOW UP: Yes  Activity Tolerance  Activity Tolerance: Patient Tolerated treatment well;Patient limited by pain  Activity Tolerance: poor complaince to NWB status on LLE during functional ambulation  Safety Devices  Safety Devices in place: Yes  Type of devices: All fall risk precautions in place;Call light within reach; Chair alarm in place; Left in chair;Nurse notified;Gait belt           Patient Diagnosis(es): There were no encounter diagnoses. has a past medical history of Acute blood loss anemia, Acute kidney injury ([address] Acute kidney injury superimposed on CKD ([address] Acute on chronic combined systolic and diastolic congestive heart failure ([address] Acute on chronic right-sided heart failure ([address] Alcohol dependence ([address] Arthritis, Asthma, CAD (coronary artery disease), Cellulitis, COPD (chronic obstructive pulmonary disease) ([address] Diabetic ulcer of left foot associated with type 2 diabetes mellitus, limited to breakdown of skin ([address] Diabetic ulcer of toe of right foot associated with type 2 diabetes mellitus ([address] Left renal artery stenosis ([address] MI (myocardial infarction) ([address] Positive FIT (fecal immunochemical test), 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 has a past surgical history that includes Tonsillectomy; Cholecystectomy; tumor excision; Upper gastrointestinal endoscopy (4/25/2013); Upper gastrointestinal endoscopy (12/10/2015); Upper gastrointestinal endoscopy (01/06/2017); Arterial bypass surgry (Left, 01/06/2016); Cardiac catheterization (03/27/2018); Coronary angioplasty with stent (03/16/2018); Rotator cuff repair (Left, 04/22/2016); Coronary angioplasty with stent (01/03/2018); Insertable Cardiac Monitor (02/14/2019); femoral bypass (Right, 5/16/2019); transluminal angioplasty (Left, 10/08/2019);  Cardiac catheterization (01/20/2020); Coronary artery bypass graft (N/A, 1/27/2020); vascular surgery (Left, 12/08/2015); transluminal angioplasty (Left, 04/29/2020); vascular surgery (Bilateral, 07/07/2020); Coronary angioplasty with stent (10/07/2019); transesophageal echocardiogram (01/26/2020); vascular surgery (Left, 08/04/2020); vascular surgery (Left, 08/05/2020); and vascular surgery (Left, 09/01/2020). Treatment Diagnosis: decreased independence with ADLs and fx mobility secondary to NWB status on LLE      Restrictions  Restrictions/Precautions  Restrictions/Precautions: Weight Bearing  Lower Extremity Weight Bearing Restrictions  Left Lower Extremity Weight Bearing: Non Weight Bearing  Position Activity Restriction  Other position/activity restrictions: NWB L leg    Subjective   General  Chart Reviewed: Yes  Patient assessed for rehabilitation services?: Yes  Additional Pertinent Hx: 60 y.o. female with a PMHx of CAD s/p PCI to LAD and circumflex 2018, s/p CABG x1 vessel (LIMA to the LAD, 01/2020), HFrEF due to [facility], s/p CardioMEMS implant (02/2019), moderate MR, PAD s/p RA stent, CKD, DM, COPD, chronic hyponatremia, chronic leg wounds. Pt now NWB on LLE. Referring Practitioner: Vinh Cisneros DPM  Diagnosis: heart failure  Subjective  Subjective: Pt lying supine in bed upon OT arrival. Pt agreeable to OT/PT evaluation. Pt stating, \"the doctor said if my labs are stable I could go home tomorrow. \"  Patient Currently in Pain: Yes (8/10 pain in foot; pt has been medicated per her report; RN aware)  Vital Signs  Patient Currently in Pain: Yes (8/10 pain in foot; pt has been medicated per her report; RN aware)  Social/Functional History  Social/Functional History  Lives With: Friend(s) (male friend)  Type of Home: Trailer  Home Layout: One level  Home Access: Stairs to enter with rails  Entrance Stairs - Number of Steps: 5  Entrance Stairs - Rails: Both  Bathroom Shower/Tub: Tub/Shower unit  Bathroom Toilet: Standard (sink nearby to push from[employer]: Tub transfer bench, Shower chair  Bathroom Accessibility: Accessible  Home Equipment: Rolling walker, 4 wheeled walker, [employer] Help From: Friend(s) (only if needed- typically independent)  ADL Assistance: Independent  Homemaking Assistance: Independent  Homemaking Responsibilities:  (Pt folds laundry, does some cooking, her own grocery shopping with a motorized cart, friend helps clean)  Ambulation Assistance: Independent  Transfer Assistance: Independent  Active : Yes  Additional Comments: pts male friend whom she lives with will be around to provide initial 24hr supervision/asssitance to patient; pts daughter lives 5 min away and will be around to check in on pt       Objective   Vision Exceptions: Wears glasses for reading  Hearing: Within functional limits    Orientation  Overall Orientation Status: Within Normal Limits  Observation/Palpation  Observation: LLE wrapped in gauze from ankle to mid calf; cath arelis; redness present in BLE  Balance  Sitting Balance: Independent  Standing Balance: Contact guard assistance  Standing Balance  Time: up to 30 seconds  Activity: static stance at edge of bed; use of 2WW; pt evidenced several occassions putting LLE on ground  ADL  Feeding: Independent; Beverage management  Grooming: Setup;Modified independent  (from seated level)  UE Dressing: Modified independent ;Setup (performed donning new gown from seated on edge of bed; required set-up for buttons and ties)  LE Dressing: Contact guard assistance;Verbal cueing; Increased time to complete (pt initially breaking NWB restrictions on LLE from bringing RLE overtop of LLE via figure four method to don socks; pt provided maximal education on adaptive way to perform with LE supported on bed vs on opposite leg)  Toileting: Unable to assess(comment) (cath donned)  Tone RUE  RUE Tone: Normotonic  Tone LUE  LUE Tone: Normotonic  Coordination  Movements Are Fluid And Coordinated: Yes     Bed (06/24/21 1154)    Goals  Short term goals  Time Frame for Short term goals: by discharge  Short term goal 1: Pt will perform sit-stand transfer while following NWB restriction on LLE with no more than CGA- not met  Short term goal 2: Pt will perform LB dressing via adaptive technique while following NWB status with set-up- not met  Short term goal 3: Pt will perform toileting assessment- not met  Patient Goals   Patient goals : return home       Therapy Time   Individual Concurrent Group Co-treatment   Time In 1000         Time Out 1044         Minutes 44         Timed Code Treatment Minutes: 29 Minutes (+ 15 min OT eval)       Reynold Frye, OT

## 2021-06-25 NOTE — PROGRESS NOTES
Podiatric Surgery Daily Progress Note  Ines Bautista      Subjective :   Patient seen and examined this am at the bedside. Patient denies any acute overnight events. Patient denies N/V/F/C/SOB. Patient denies calf pain, thigh pain, chest pain. Review of Systems: A 12 point review of symptoms is unremarkable with the exception of the chief complaint. Patient specifically denies nausea, fever, vomiting, chills, shortness of breath, chest pain, abdominal pain, constipation or difficulty urinating. Objective     BP (!) 152/86   Pulse 81   Temp 97.7 °F (36.5 °C) (Oral)   Resp 16   Ht 5' 4\" (1.626 m)   Wt 246 lb 14.6 oz (112 kg)   LMP 10/24/2012   SpO2 98%   BMI 42.38 kg/m²      I/O:    Intake/Output Summary (Last 24 hours) at 6/25/2021 0829  Last data filed at 6/25/2021 2593  Gross per 24 hour   Intake 1520 ml   Output 1501 ml   Net 19 ml              Wt Readings from Last 3 Encounters:   06/25/21 246 lb 14.6 oz (112 kg)   06/21/21 245 lb (111.1 kg)   06/17/21 245 lb (111.1 kg)       LABS:    Recent Labs     06/24/21  0932 06/25/21  0549   WBC 8.3 9.1   HGB 13.2 14.1   HCT 40.8 42.6    284        Recent Labs     06/25/21  0549   *   K 4.1   CL 81*   CO2 31   BUN 78*   CREATININE 1.9*        Recent Labs     06/22/21  1910   INR 1.16*   APTT 29.4           LOWER EXTREMITY EXAMINATION    Dressing to left LE intact. No strikethrough noted to the external dressing. No drainage noted to the internal layers of the dressing. VASCULAR: DP and PT pulses nonpalpable bilateral.  Upon hand-held Doppler examination, DP and PT signals monophasic bilateral.  CFT is sluggish to the digits of the foot b/l. Skin temperature is warm to cool from proximal to distal with no focal calor noted. +3 pitting edema noted bilateral lower extremity. No pain with calf compression b/l.     NEUROLOGIC: Gross and epicritic sensation is diminished b/l.  Protective sensation is absent at all pedal sites b/l.     DERMATOLOGIC: Diffuse dermatologic changes noted to bilateral lower extremity. Dependent rubor noted to bilateral lower extremity. Multiple full-thickness ulcerations noted to bilateral lower extremity 2/2 combination of arterial and venous insufficiency. No purulent drainage, malodor, erythema, fluctuance, or crepitus noted. Wounds do not probe to bone, tunnel, or track. Right lower extremity: Second digit noted to have dusky appearance. Patient provided verbal consent for today's photos. Left lower extremity: Distal aspects of digits 1 through 5 with ischemic changes. MUSCULOSKELETAL: Muscle strength is 5/5 for all pedal groups tested. No pain with palpation of the foot or ankle b/l. Ankle joint ROM is decreased in dorsiflexion with the knee extended. No obvious biomechanical abnormalities. IMAGING:  X-ray tibia-fibula right 6/22/2021  FINDINGS: Diffuse soft tissue swelling. No osseous erosion. No soft tissue gas. Metallic clips consistent with previous vascular surgery.           Impression   1. Diffuse soft tissue swelling without acute osseous abnormality.             X-ray ankle right 6/22/2021  FINDINGS: Soft tissue swelling. No soft tissue gas identified. No periosteal reaction to suggest osteomyelitis.           Impression   1. Soft tissue swelling without significant osseous abnormality otherwise.             X-ray foot right 6/22/2021  FINDINGS: Mineralization appears intact. No acute fracture or dislocation. Moderate calcaneal enthesopathy. First MTP osteoarthritis. Diffuse soft tissue swelling along the distal foot. No convincing evidence of soft tissue gas or osseous erosion.           Impression   1. Soft tissue swelling without acute osseous abnormality. 2. Calcaneal enthesopathy. 3. Polyarticular osteoarthritis.              X-ray tibia-fibula left 6/22/2021  FINDINGS:       No evidence of acute fracture or traumatic bony abnormality is seen.       No gross bony destructive changes are seen.       There is suggestion of diffuse subcutaneous edema which appears similar to prior study.       Ulcer is seen anteriorly in the soft tissues overlying the distal tibial shaft. This is unchanged.           Impression       No evidence of fracture or bony abnormality seen.             X-ray ankle left 6/22/2021  FINDINGS: There appears be an ulceration along the anterior aspect of the lower calf. Extensive calcaneal enthesopathy. Midfoot osteoarthritis with erosive changes and fragmentation of the navicular bone suggesting neuropathic change.           Impression   1. Ulceration of the distal calf with diffuse soft tissue swelling. No evidence for underlying osteomyelitis. 2. Neuropathic change in the midfoot. 3. Calcaneal enthesopathy.             X-ray foot left 6/22/2021  FINDINGS: Diffuse soft tissue swelling over the distal foot. There is fragmentation of the navicular bone and erosive change in the distal talus suggesting neuropathic change. Calcaneal enthesopathy. No soft tissue gas identified. No convincing evidence    of osteomyelitis.           Impression   1. Soft tissue swelling and evidence of neuropathic change in the midfoot. No convincing evidence of osteomyelitis or acute osseous abnormality.             Arterial duplex bilateral lower extremity 6/18/2021  Impressions   Right Impression   The right ankle/brachial index is 0.9 (the DP is 90 and the PT is 100 mmHg). The common femoral artery demonstrates multiphasic flow indicating no   significant aortic-iliac inflow disease. There is a patent proximal SFA - popliteal bypass graft. However, due to body   habitus and depth of vessels, graft was difficult to image. There is abnormal monophasic flow demonstrated in the posterior tibial artery,   anterior tibial artery and peroneal artery.    There is atherosclerotic plaque seen within the common femoral artery,   popliteal artery and calf arteries consistent with <50% stenosis. Left Impression   The left ankle/brachial index is 0.6 (the DP is 60 and the PT is non-audible   mmHg). The common femoral artery demonstrates abnormal monophasic flow indicating   significant aortic-iliac inflow disease. The proximal - distal SFA appears occluded with a non functioning stent. There is abnormal monophasic flow demonstrated in the popliteal artery,   posterior tibial artery and anterior tibial artery. The peroneal artery appears occluded. There is atherosclerotic plaque seen within the common femoral artery, prox -   distal SFA, popliteal artery and calf arteries. Previous exam on 06/26/2020, right ZACH: 0.9 left ZACH: 0.8.       Conclusions        Summary        Normal right ZACH.    Abnormal left ZACH in the range of moderate arterial insufficiency.        Patent right femoropopliteal bypass with distal graft velocity below the    threshold associated with threatened graft patency. No identified graft    abnormality.        Occluded L SFA associated with stent occlusion.          ASSESSMENT/PLAN  -Multiple full-thickness ulcerations, bilateral lower extremity, likely 2/2 combination of arterial and venous insufficiency  -Peripheral arterial disease, bilateral lower extremity  -Venous insufficiency, bilateral lower extremity  -Diabetes mellitus type 2 with peripheral neuropathy, bilateral lower extremity  -Charcot neuroarthropathy, left foot     -Patient examined and evaluated at the bedside   -Hypertensive, otherwise VSS. No leukocytosis noted.   -CRP 48.8, ESR 41  -X-rays reviewed and impression noted above  -Arterial duplex reviewed and impression noted above  -Vascular consult ordered; patient to follow-up with  outpatient  -Debridement deferred due to peripheral arterial disease  -Betadine applied to distal digits left foot and wounds b/l LE  -Mepilex border applied to left leg wound  -Antibiotics not indicated at this time from podiatric standpoint  -Nonweightbearing left leg. Weightbearing as tolerated right leg.  -Discussed Charcot neuroarthropathy left foot with patient stressing importance of being nonweightbearing to left leg. Discussed with patient that SNF stay may be required if she is unable to remain nonweightbearing to the left leg until she is fitted for a [facility] [place]. Patient understands but is questionable of SNF at this time. -PT/OT:11/15       DISPO: Multiple full-thickness ulcerations, bilateral lower extremity, likely 2/2 combination of arterial and venous insufficiency; stable at his time. Vascular consult; follow-up outpatient with Dr. Kim Macedo. No surgical intervention planned from podiatric standpoint at this time. PT/OT: 11/15; Patient possibly acceptive of SNF at this time.     Patient examined and evaluated at the bedside with Dr. Samantha Esteban DPM.    Trace Shook [facility]  06/25/21  8:28 AM

## 2021-06-25 NOTE — PROGRESS NOTES
Hospitalist Progress Note      PCP: Laureano Heath PA-C    Date of Admission: 6/21/2021    Chief Complaint: SOB    Hospital Course: Patient is 49-year-old female with history of CAD, CABG x1, ischemic cardiomyopathy, CHF s/p CardioMEMS implant 2/2019, COPD, PAD, CKD stage III presented to [facility] ER with complaint of shortness of breath and weight gain of 30 pounds. Patient reported that she has been noncompliant with her diet having a lot of salt in food. Patient was noted to be in A. fib with RVR on telemetry. Podiatry was consulted for chronic venous ulcers. Patient had abnormal artery duplex vascular surgery was consulted recommended Follow-up with Dr. oDry Mansfield as an outpatient    Subjective    Patient is anxious to go home. Reports that breathing is better. Denies nausea, vomiting, fever, chills. Patient was noted to be hyponatremic requiring 3% saline infusion. Diuretics on hold.     Medications:  Reviewed    Infusion Medications    [Held by provider] furosemide (LASIX) 1mg/ml infusion 5 mg/hr (06/23/21 0926)    dextrose       Scheduled Medications    [Held by provider] torsemide  100 mg Oral Daily    metoprolol tartrate  50 mg Oral BID    potassium chloride  40 mEq Oral BID WC    spironolactone  50 mg Oral BID    budesonide  0.5 mg Nebulization BID    And    Arformoterol Tartrate  15 mcg Nebulization BID    ARIPiprazole  15 mg Oral Daily    benztropine  1 mg Oral Nightly    busPIRone  30 mg Oral BID    doxepin  25 mg Oral Nightly    lamoTRIgine  200 mg Oral BID    methocarbamol  500 mg Oral BID    [Held by provider] insulin glargine  30 Units Subcutaneous Nightly    insulin lispro  0-12 Units Subcutaneous TID WC    apixaban  5 mg Oral BID    atorvastatin  80 mg Oral Nightly    clopidogrel  75 mg Oral Daily    pantoprazole  40 mg Oral BID AC     PRN Meds: oxyCODONE-acetaminophen, glucose, dextrose, glucagon (rDNA), dextrose      Intake/Output Summary (Last 24 hours) at 6/25/2021 1331  Last data filed at 6/25/2021 0917  Gross per 24 hour   Intake 1220 ml   Output 1500 ml   Net -280 ml       Physical Exam Performed:    /77   Pulse 85   Temp 97.4 °F (36.3 °C) (Oral)   Resp 16   Ht 5' 4\" (1.626 m)   Wt 246 lb 14.6 oz (112 kg)   LMP 10/24/2012   SpO2 99%   BMI 42.38 kg/m²     General appearance: NAD. HEENT: Pupils equal, round, and reactive to light. Conjunctivae/corneas clear. Neck: Supple, with full range of motion. No jugular venous distention. Trachea midline. Respiratory: Bilateral diminished breathing sounds with mild crackles. No wheezes no rhonchi. Cardiovascular: Irregular rhythm with normal O2/J8, grade 2 systolic murmur. Abdomen: Soft, non-tender, non-distended with normal bowel sounds. Musculoskeletal: Bilateral lower extremity 3+ pitting edema  Skin: Chronic venous ulcers on both legs. Neurologic:  Neurovascularly intact without any focal sensory/motor deficits. Cranial nerves: II-XII intact, grossly non-focal.  Psychiatric: Alert and oriented, thought content appropriate, normal insight  Capillary Refill: Brisk,< 3 seconds   Peripheral Pulses: +2 palpable, equal bilaterally       Labs:   Recent Labs     06/23/21  0649 06/24/21  0932 06/25/21  0549   WBC 7.1 8.3 9.1   HGB 13.8 13.2 14.1   HCT 41.7 40.8 42.6    247 284     Recent Labs     06/24/21  1854 06/25/21  0302 06/25/21  0549   * 122* 125*   K 4.3 4.4 4.1   CL 79* 83* 81*   CO2 28 25 31   BUN 73* 73* 78*   CREATININE 1.8* 1.9* 1.9*   CALCIUM 9.2 9.2 9.4     No results for input(s): AST, ALT, BILIDIR, BILITOT, ALKPHOS in the last 72 hours. Recent Labs     06/22/21  1910   INR 1.16*     No results for input(s): Bogdan Chappell in the last 72 hours.     Urinalysis:      Lab Results   Component Value Date    NITRU Negative 06/21/2021    WBCUA 0-2 11/30/2020    BACTERIA 2+ 11/30/2020    RBCUA None seen 11/30/2020    BLOODU Negative 06/21/2021    SPECGRAV 1.010 06/21/2021 GLUCOSEU Negative 06/21/2021       Radiology:  XR FOOT LEFT (MIN 3 VIEWS)   Final Result   1. Soft tissue swelling and evidence of neuropathic change in the midfoot. No convincing evidence of osteomyelitis or acute osseous abnormality. XR ANKLE LEFT (MIN 3 VIEWS)   Final Result   1. Ulceration of the distal calf with diffuse soft tissue swelling. No evidence for underlying osteomyelitis. 2. Neuropathic change in the midfoot. 3. Calcaneal enthesopathy. XR TIBIA FIBULA LEFT (2 VIEWS)   Final Result      No evidence of fracture or bony abnormality seen. XR FOOT RIGHT (MIN 3 VIEWS)   Final Result   1. Soft tissue swelling without acute osseous abnormality. 2. Calcaneal enthesopathy. 3. Polyarticular osteoarthritis. XR ANKLE RIGHT (MIN 3 VIEWS)   Final Result   1. Soft tissue swelling without significant osseous abnormality otherwise. XR TIBIA FIBULA RIGHT (2 VIEWS)   Final Result   1. Diffuse soft tissue swelling without acute osseous abnormality. Assessment/Plan:    Active Hospital Problems    Diagnosis Date Noted    Heart failure ([facility] [address] [I50.9] 06/21/2021     Assessment and plan  #Acute on chronic combined systolic and diastolic heart failure due to diet noncompliance  Echo 4/2021 EF 07%, grade 3 diastolic dysfunction. Moderate pulmonary hypertension. Moderate MR  Per last cardiology office note patient with 245 pound  Strict intake and output, daily weight. Hold lasix and Aldactone due to hyponatremia. #Afib with RVR new onset: Currently NSR  Rate controlled did receive digoxin on 6/2020. Anticoagulation with Eliquis. Rate control with metoprolol    #Hyponatremia suspected SIADH in the past.  Did receive tolvaptan yesterday and today. Required briefly hypertonic saline last night. #CKD stage III  Creatinine 1.7 appears to be baseline  Nephrology consulted. #Diabetes mellitus type 2  Continue to hold Lantus and bedtime Humalog due to hypoglycemia. Continue insulin sliding scale. HbA1c 11.9% 5/20/2021    #CAD  #CABG x1. Continue aspirin, Plavix, statin, beta-blocker. #Chronic venous ulcers  Podiatry consulted recs no intervention      DVT Prophylaxis: Heparin  Diet: Adult Oral Nutrition Supplement; Protein Modular  ADULT DIET; Regular; Low Sodium (2 gm)  Code Status: Prior    PT/OT Eval Status: In progress    Dispo - inpatient. Diuresis. D/C in 1 to 2 days.   Pre-CERT discharge    This chart was likely completed using voice recognition technology and may contain unintended grammatical , phraseology,and/or punctuation errors      Lisa Michelle MD

## 2021-06-25 NOTE — PROGRESS NOTES
MPV 8.8 06/25/2021     BMP:    Lab Results   Component Value Date     06/25/2021    K 4.1 06/25/2021    CL 81 06/25/2021    CO2 31 06/25/2021    BUN 78 06/25/2021    LABALBU 4.0 06/21/2021    CREATININE 1.9 06/25/2021    CALCIUM 9.4 06/25/2021    GFRAA 33 06/25/2021    LABGLOM 27 06/25/2021    GLUCOSE 105 06/25/2021       IMPRESSION/RECOMMENDATIONS:      SHAUNNA on CKD:  -Attributed to CRS  -Improved with diuresis, but now stable at 1.8-1.9mg/dL  -Continue to hold diuretics as below    CKD 3:  -Attributed to recurrent AKIs (CHF/CRS) and low nephron mass (L HEMAL s/p angiogram complicated by perinephric hematoma requiring coil embolization of L subsegmental renal artery)  -Baseline Cr 1.3-1.6mg/dL, though has likely settled closer to 1.6-1.8mg/dL after recent SHAUNNA  -Follows with Dr. Obdulia Basilio    Acute on Chronic Hyponatremia:  -Chronic hyponatremia likely due to CHF.  As high as 136mmol/L in 5/2021 (and 135mmol/L earlier this month), so doubt baseline is 127-128mmol/L  -Attributed to CHF/SIADH, but Na+ worsening with diuresis, which is more suggestive of diminished renal perfusion  -S/p tolvaptan yesterday, with worsening Na+ levels overnight, requiring 3% hypertonic NS bolus  -Check SOsm, Kay+, UOsm today  -Continue to hold diuretics and further doses of tolvaptan today pending above work up  -Monitor Na+ levels [id number]    Hypokalemia:  -At goal s/p replacement  -On spironolactone    Hypertension:  -At goal  -Continue current regimen    AFib:  -On beta-blocker  -Management per Cardiology    Acute on Chronic Systolic CHF Exacerbation:  -Hold diuresis as above  -Management per Cardiology            Ranjeet York MD, HALLIE  06/25/21  [facility]

## 2021-06-25 NOTE — CARE COORDINATION
Sw Following, spoke with Polo Mcdonald [phone] at [facility], they have accepted the Pt and started precert. DC pending precert, Pt will need transportation. Electronically signed by KATIE Moreno, TIRSOW on 6/25/2021 at 1:06 [phone]      UPDATE: SUSANNE spoke with Pt and Pt's dttr, they identified that they have changed their minds and want their mother to go to [facility]. SUSANNE spoke with Cheryl Travis [phone] at [facility] and they confirmed that they can still take the Pt. SUSANNE called "[employer]" [phone] and spoke with a representative. [facility] was able to change the facility from [facility] to [facility]#[id number]. SUSANNE called [facility] and updated her. Pt's precert it valid for 3 days.

## 2021-06-25 NOTE — PROGRESS NOTES
Cardiology Consult Service  Daily Progress Note        Admit Date:  6/21/2021  Primary cardiologist: Dr Dorita Sunshine (follows with Kemar Melo CNP)    Reason for Consultation/Chief Complaint: AHF    Subjective:      Juan Daniel Sinclair is a 60 y.o. female with a past medical history of CAD s/p PCI to LAD and circumflex 2018, s/p CABG x1 vessel (LIMA to the LAD, 01/2020), HFrEF due to HOSP DEL [facility], s/p CardioMEMS implant (02/2019), moderate MR, PAD s/p RA stent, CKD, DM, COPD, chronic hyponatremia, chronic leg wounds.     Echo 04/2021: Normal LV size, LVEF 59%, diastolic grade 3, moderate MR, RV dilatation with dysfunction, RVSP 52 (RAP 8).     R/LHC 08/2020: RA 15, PA 69/20, W 19, CO 5.4, EDP 22. LIMA to LAD patent with coronaries still due to left subclavian artery stenosis.     ECG 04/2021: NSR with PACs.     Home medications torsemide 100 mg daily, metolazone 5 mg 3 times a week, spironolactone 50 mg daily     Patient presented to the emergency room with weight gain of 30 pounds, worsening shortness of breath and lower extremity edema. Patient admits to excess salt in her diet. Potassium was 2.8. ECG revealed AF with RVR, right bundle branch block, inferior, anterior, lateral MI. She was admitted for acute heart failure, hypokalemia, PAFRVR. Patient has not been started on any IV diuretics due to persistent hypokalemia currently being treated with IV and p.o. potassium. She remains off supplemental oxygen. Creatinine down to 1.7 from 2.0. Chest x-ray unremarkable. Interval history:  Patient reports improvement with her symptoms. I's and O's inaccurate, patient remains off supplemental oxygen. Sodium increasing at 125 after [place] on 6/24. Creatinine stable around 1.9, potassium within normal limits.     Objective:     Medications:   [Held by provider] torsemide  100 mg Oral Daily    metoprolol tartrate  50 mg Oral BID    potassium chloride  40 mEq Oral BID WC    spironolactone  50 mg Oral BID    budesonide  0.5 adenopathy, thyroid: not enlarged, symmetric, no tenderness/mass/nodules, no carotid bruit. Lungs:   Normal respiratory rate, lungs mild basilar ronchi bilaterally. Chest Wall:  No tenderness or deformity   Heart:  Irregular rhythm, rate is controlled, S1, S2 normal, there is no murmur, there is no rub or gallop, cannot assess jvd, 2+ bilateral lower extremity edema   Abdomen:   Soft, non-tender, bowel sounds active all four quadrants,  no masses, no organomegaly       Extremities: Extremities normal, atraumatic, no cyanosis. Pulses: 2+ and symmetric   Skin: Skin color, texture, turgor normal, no rashes or lesions   Pysch: Normal mood and affect   Neurologic: Normal gross motor and sensory exam.  Cranial nerves intact       Labs:   Recent Labs     06/23/21  0649 06/23/21  1744 06/24/21  0932 06/24/21  1854 06/25/21  0302 06/25/21  0549   * 119* 122* 119* 122* 125*   K 3.0* 3.2* 3.5 4.3 4.4 4.1   BUN 64* 67* 70* 73* 73* 78*   CREATININE 1.6* 1.8* 1.9* 1.8* 1.9* 1.9*   CL 80* 77* 80* 79* 83* 81*   CO2 30 29 30 28 25 31   GLUCOSE 21* 248* 151* 161* 138* 105*   CALCIUM 9.5 9.0 9.2 9.2 9.2 9.4   MG 2.10 2.10 2.20  --   --   --      Recent Labs     06/23/21  0649 06/24/21  0932 06/25/21  0549   WBC 7.1 8.3 9.1   HGB 13.8 13.2 14.1   HCT 41.7 40.8 42.6    247 284   MCV 88.1 88.3 88.0     No results for input(s): CHOLTOT, TRIG, HDL in the last 72 hours. Invalid input(s): LIPIDCOMM, CHOLHDL, VLDCHOL, LDL  Recent Labs     06/22/21  1910   INR 1.16*     No results for input(s): CKTOTAL, CKMB, CKMBINDEX, TROPONINI in the last 72 hours. No results for input(s): BNP in the last 72 hours. No results for input(s): NTPROBNP in the last 72 hours. No results for input(s): TSH in the last 72 hours. Imaging:       Assessment & Plan:     1. Acute on chronic HFrEF:  It is ischemic in etiology. Patient is volume overloaded but hemodynamically stable. It is most likely due to excess dietary salt. Patient reports compliance with medications.    -Cw spironolactone 50 mg bid.   -We will start torsemide 100 mg daily, avoid metolazone due to severe hypokalemia.  -Frequent BMP check and correction of potassium  -Cw Lopressor 25 twice daily (in view of low normal BP and AF RVR). - Unable to start ACEI/ARB/spironolactone or Entresto due to CKD (allergy to lisino is severe hypotension, likely not a true allergy, rather a dose effect)  -Patient does not have an ICD  - Strict I's and O's every shift and standing weights if possible, low-salt diet and daily BMP with reflex to Mg, wean supplemental oxygen to off for sats greater than 94%.     2. PAF with RVR:  This is a new diagnosis for patient. It could be precipitating her acute heart failure.     -We will treat with beta-blockers  -Patient received digoxin 250 mcg IV x1 on 6/22, rate is now controlled. -Now on Eliquis.     3. CAD s/p stents and CABG:  There is no evidence of ACS with this admission.     -Continue with Plavix          I have spent 35 minutes of face to face time with the patient with more than 50% spent counseling and coordinating care. I have personally reviewed the reports and images of labs, radiological studies, cardiac studies including ECG's and telemetry, current and old medical records. The note was completed using EMR and Dragon dictation system. Every effort was made to ensure accuracy; however, inadvertent computerized transcription errors may be present. All questions and concerns were addressed to the patient/family. Alternatives to my treatment were discussed. Thank you for allowing to us to participate in the christopher or Ama Pickett. Please call our service with questions.     Linh Ontiveros MD, [facility] [place], [address]  The [address]  [address] [place] [address]  Ph: [phone]  Fax: [phone]

## 2021-06-26 NOTE — PROGRESS NOTES
Hospitalist Progress Note      PCP: Elizabeth Berger PA-C    Date of Admission: 6/21/2021    Chief Complaint: SOB    Hospital Course: Patient is 53-year-old female with history of CAD, CABG x1, ischemic cardiomyopathy, CHF s/p CardioMEMS implant 2/2019, COPD, PAD, CKD stage III presented to [facility] ER with complaint of shortness of breath and weight gain of 30 pounds. Patient reported that she has been noncompliant with her diet having a lot of salt in food. Patient was noted to be in A. fib with RVR on telemetry. Podiatry was consulted for chronic venous ulcers. Patient had abnormal artery duplex vascular surgery was consulted recommended Follow-up with Dr. Neva Mercer as an outpatient    Subjective    Denies any chest pain, shortness of breath, nausea, vomiting. Wanted to be discharged from the hospital.  Discussed with  pre-CERT is pending.     Medications:  Reviewed    Infusion Medications    dextrose       Scheduled Medications    [Held by provider] torsemide  100 mg Oral Daily    metoprolol tartrate  50 mg Oral BID    potassium chloride  40 mEq Oral BID WC    spironolactone  50 mg Oral BID    budesonide  0.5 mg Nebulization BID    And    Arformoterol Tartrate  15 mcg Nebulization BID    ARIPiprazole  15 mg Oral Daily    benztropine  1 mg Oral Nightly    busPIRone  30 mg Oral BID    doxepin  25 mg Oral Nightly    lamoTRIgine  200 mg Oral BID    methocarbamol  500 mg Oral BID    [Held by provider] insulin glargine  30 Units Subcutaneous Nightly    insulin lispro  0-12 Units Subcutaneous TID WC    apixaban  5 mg Oral BID    atorvastatin  80 mg Oral Nightly    clopidogrel  75 mg Oral Daily    pantoprazole  40 mg Oral BID AC     PRN Meds: melatonin, oxyCODONE-acetaminophen, glucose, dextrose, glucagon (rDNA), dextrose      Intake/Output Summary (Last 24 hours) at 6/26/2021 1303  Last data filed at 6/26/2021 1115  Gross per 24 hour   Intake 960 ml   Output 1925 ml   Net -965 ml Physical Exam Performed:    /83   Pulse 79   Temp 97.4 °F (36.3 °C) (Oral)   Resp 18   Ht 5' 4\" (1.626 m)   Wt 262 lb 12.8 oz (119.2 kg)   LMP 10/24/2012   SpO2 98%   BMI 45.11 kg/m²     General appearance: NAD. Sitting up on chair. HEENT: Pupils equal, round, and reactive to light. Conjunctivae/corneas clear. Neck: Supple, with full range of motion. No jugular venous distention. Trachea midline. Respiratory: Bilateral diminished breathing sounds with mild crackles. No wheezes no rhonchi. Cardiovascular: Irregular rhythm with normal Z9/T0, grade 2 systolic murmur. Abdomen: Soft, non-tender, non-distended with normal bowel sounds. Musculoskeletal: Bilateral lower extremity 3+ pitting edema  Skin: Chronic venous ulcers on both legs. Neurologic:  Neurovascularly intact without any focal sensory/motor deficits. Cranial nerves: II-XII intact, grossly non-focal.  Psychiatric: Alert and oriented, thought content appropriate, normal insight  Capillary Refill: Brisk,< 3 seconds   Peripheral Pulses: +2 palpable, equal bilaterally       Labs:   Recent Labs     06/24/21  0932 06/25/21  0549 06/26/21  0653   WBC 8.3 9.1 10.5   HGB 13.2 14.1 14.3   HCT 40.8 42.6 44.0    284 257     Recent Labs     06/25/21  1832 06/26/21  0009 06/26/21  0653   * 123* 122*   K 5.8* 4.8 4.7   CL 82* 83* 81*   CO2 28 26 25   BUN 75* 75* 73*   CREATININE 1.9* 2.0* 1.9*   CALCIUM 9.4 9.4 9.5     No results for input(s): AST, ALT, BILIDIR, BILITOT, ALKPHOS in the last 72 hours. No results for input(s): INR in the last 72 hours. No results for input(s): Edwardo Clap in the last 72 hours. Urinalysis:      Lab Results   Component Value Date    NITRU Negative 06/21/2021    WBCUA 0-2 11/30/2020    BACTERIA 2+ 11/30/2020    RBCUA None seen 11/30/2020    BLOODU Negative 06/21/2021    SPECGRAV 1.010 06/21/2021    GLUCOSEU Negative 06/21/2021       Radiology:  XR FOOT LEFT (MIN 3 VIEWS)   Final Result   1. 5/20/2021    #CAD  #CABG x1. Continue aspirin, Plavix, statin, beta-blocker. #Chronic venous ulcers  Podiatry consulted recs no intervention      DVT Prophylaxis: Heparin  Diet: Adult Oral Nutrition Supplement; Protein Modular  ADULT DIET; Regular; Low Sodium (2 gm)  Code Status: Prior    PT/OT Eval Status: In progress    Dispo - inpatient. Pending pre-CERT and nephro clearance.    This chart was likely completed using voice recognition technology and may contain unintended grammatical , phraseology,and/or punctuation errors      Fausto Winn MD

## 2021-06-26 NOTE — PROGRESS NOTES
Pt is rate control Afib on telemetry this AM. Pt denies any chest pain or SOB. Will continue to monitor.

## 2021-06-26 NOTE — PROGRESS NOTES
[address]   Cardiology  Note   Aurora Rivera FNP APRN CVNP  Date: 6/26/2021  Admit Date: 6/21/2021       CC:AHF     Interval Hx /  Subjective: Today, she is resting in chair  Net - 4.2 liter  /sCr 2.0 >1.9  / VSS HR 70s  NSR   Card meds Demadex aldactone klor con lopressor Plavix Lipitor eliquis  Planning discharge but probably will be Monday   No new complaints today. No major events overnight. Denies having chest pain, palpitations, usual shortness of breath,     PMH: CAD s/p PCI to LAD and circumflex 2018, s/p CABG x1 vessel (LIMA to the LAD, 01/2020), HFrEF due to HOSP DEL [facility], s/p CardioMEMS implant (02/2019), moderate MR, PAD s/p RA stent, CKD, DM, COPD, chronic hyponatremia, chronic leg wounds low vit D   Echo 04/2021: Normal LV size, LVEF 82%, diastolic grade 3, moderate MR, RV dilatation with dysfunction, RVSP 52 (RAP 8).     R/LHC 08/2020: RA 15, PA 69/20, W 19, CO 5.4, EDP 22. LIMA to LAD patent with coronaries still due to left subclavian artery stenosis. Patient seen and examined. Clinical notes reviewed.  Telemetry reviewed / testing reviewed  >29 minutes   Pertinent labs, diagnostic, device, and imaging results reviewed as a part of this visit  EKG TTE stress test: any LHC/RHC reviewed     Past Medical History:  Past Medical History:   Diagnosis Date    Acute blood loss anemia 8/5/2020    Acute kidney injury ([address] 12/25/2019    Acute kidney injury superimposed on CKD ([address] 8/5/2020    Acute on chronic combined systolic and diastolic congestive heart failure ([address] 1/10/2020    Acute on chronic right-sided heart failure ([address] 08/2020    Alcohol dependence ([address] 3/10/2010    Arthritis     Asthma     CAD (coronary artery disease)     Cellulitis 6/3/2020    COPD (chronic obstructive pulmonary disease) ([address]     Diabetic ulcer of left foot associated with type 2 diabetes mellitus, limited to breakdown of skin ([address] 1/18/2020    Diabetic ulcer of toe of right foot associated with type 2 diabetes mellitus ([facility] 1/18/2020    Left renal artery stenosis ([facility] 08/2020    MI (myocardial infarction) ([facility] 10/07/2019    NSTEMI    Positive FIT (fecal immunochemical test) 2/28/2020    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 steal syndrome 09/01/2020    Traumatic perinephric hematoma, left, initial encounter 8/4/2020        Scheduled Meds:   [Held by provider] torsemide  100 mg Oral Daily    metoprolol tartrate  50 mg Oral BID    potassium chloride  40 mEq Oral BID WC    spironolactone  50 mg Oral BID    budesonide  0.5 mg Nebulization BID    And    Arformoterol Tartrate  15 mcg Nebulization BID    ARIPiprazole  15 mg Oral Daily    benztropine  1 mg Oral Nightly    busPIRone  30 mg Oral BID    doxepin  25 mg Oral Nightly    lamoTRIgine  200 mg Oral BID    methocarbamol  500 mg Oral BID    [Held by provider] insulin glargine  30 Units Subcutaneous Nightly    insulin lispro  0-12 Units Subcutaneous TID WC    apixaban  5 mg Oral BID    atorvastatin  80 mg Oral Nightly    clopidogrel  75 mg Oral Daily    pantoprazole  40 mg Oral BID AC     Continuous Infusions:   dextrose       Vitals:    06/26/21 1152   BP: 121/83   Pulse: 79   Resp: 18   Temp: 97.4 °F (36.3 °C)   SpO2: 98%      In: 960 [P.O.:960]  Out: 1925    Wt Readings from Last 3 Encounters:   06/26/21 262 lb 12.8 oz (119.2 kg)   06/21/21 245 lb (111.1 kg)   06/17/21 245 lb (111.1 kg)       Intake/Output Summary (Last 24 hours) at 6/26/2021 1254  Last data filed at 6/26/2021 1115  Gross per 24 hour   Intake 960 ml   Output 1925 ml   Net -965 ml       Telemetry: Personally Reviewed    · Constitutional: Cooperative and in no apparent distress, and appears well nourished  · Skin: Warm and pink;   · HEENT: Symmetric and normocephalic. PERRL, EOM intact. Conjunctiva pink with clear sclera.  Mucus membranes moist.   · Cardiovascular: Regular rate and rhythm. ++edema   · Respiratory: decreased with rales / lower legs discolored and edematous   · Gastrointestinal: Abdomen soft and round. Bowel sounds normoactive without tenderness or masses. · Musculoskeletal: Bilateral upper and lower extremity strength 5/5 with full ROM  · Neurologic/Psych: Awake and orientated to person, place and time.  Calm affect, appropriate mood    Patient Active Problem List    Diagnosis Date Noted    Mitral valve regurgitation 05/24/2018    CAD (coronary artery disease)     Heart failure ([address] 06/21/2021    Cellulitis 04/22/2021    Closed nondisplaced fracture of navicular bone of left foot     Acute on chronic systolic CHF (congestive heart failure) ([address] 01/75/7979    Acute systolic CHF (congestive heart failure) [facility] 01/11/2021    CHF (congestive heart failure), NYHA class I, acute on chronic, combined ([address] 01/11/2021    DKA, type 2, not at goal [facility]) 12/21/2020    Moderate protein-calorie malnutrition ([address] 12/01/2020    Dyspnea on exertion     Ischemic heart disease     Hypokalemia 11/30/2020    CKD (chronic kidney disease) stage 4, GFR 15-29 ml/min ([facility])     Acute kidney injury superimposed on CKD ([address] 08/05/2020    Morbid obesity with BMI of 40.0-44.9, adult ([address] 08/05/2020    PAD (peripheral artery disease) ([address]     Peripheral venous insufficiency 07/02/2020    Chronic renal impairment     Lower extremity edema 06/22/2020    Habitual self-excoriation 06/22/2020    Ulcer of left lower leg, limited to breakdown of skin ([address] 06/22/2020    Ulcer of right leg, limited to breakdown of skin ([address] 06/22/2020    Arthritis     Cardiomyopathy ([address]     Pulmonary hypertension ([address]     Acute on chronic congestive heart failure ([address] 02/28/2020    Pulmonary nodule 02/28/2020    Class 2 severe obesity due to excess calories with serious comorbidity and body mass index (BMI) of 39.0 to 39.9 in adult [facility]) 02/28/2020    Bipolar disorder ([address] 02/28/2020    Chronic systolic CHF (congestive heart failure) ([facility] 02/21/2020    Dyslipidemia  Vitamin D deficiency 10/29/2019    SHAUNNA (acute kidney injury) ([facility] 09/29/2019    COPD (chronic obstructive pulmonary disease) ([facility] 05/17/2019    Hyponatremia 05/23/2018    Iron deficiency anemia 05/23/2018    GERD (gastroesophageal reflux disease)     Anxiety and depression     Type 2 diabetes mellitus with diabetic polyneuropathy, with long-term current use of insulin ([facility] 12/10/2015    CLINT (obstructive sleep apnea) 12/10/2015    Tobacco abuse disorder 12/10/2015    Abel's esophagus 04/29/2013    Essential hypertension 04/13/2011    Viral hepatitis C 03/10/2010      Assessment     CAD  /  stable  / on BB/ statin / antiplatelet   s/p PCI to LAD and circumflex 2018,   s/p CABG x1 vessel (LIMA to the LAD, 01/2020),     Moderate MR   Echo 04/2021: Normal LV size, LVEF 76%, diastolic grade 3, moderate MR, RV dilatation with dysfunction, RVSP 52 (RAP 8).   R/LHC 08/2020: RA 15, PA 69/20, W 19, CO 5.4, EDP 22. LIMA to LAD patent with coronaries still due to left subclavian artery stenosis    HFrEF due to HOSP DEL [facility],   s/p CardioMEMS implant (02/2019)     moderate MR,     PAD s/p RA stent,     CKD   1.9 was 2.0 / follow     DMT2  Managed per IM      COPD  Stable     chronic hyponatremia    chronic leg wounds    low vit D   Recheck    Plan   Net - 4.2 liter  / sCr 2.0 >1.9  / VSS HR 70s NSR    Card meds Demadex aldactone klor con lopressor Plavix lipitor eliquis  Per Dr Favio Hanna: unable to start ACEI/ARB/spironolactone or Entresto due to CKD (allergy to lisino is severe hypotension, likely not a true allergy, rather a dose effect)  ICM  / no AICD at this time   Will follow   Planning discharge pending precert     Thank you for allowing to us to participate in the care of [employer].   Jeet Smith APRN-CNP-CVNP    [facility]

## 2021-06-26 NOTE — PROGRESS NOTES
Pt requested something for sleep, [facility] Hospitalist for new orders. Melatonin 3 mg po given as ordered. Also medicated with Percocet one tab for back pain. Vital signs stable, afebrile. Pt denies any chest pain or SOB. Call light in reach, bed alarm in place.

## 2021-06-26 NOTE — PROGRESS NOTES
0000 NA level was 123. [facility]N.N.O's noted. Glucose 69, peanut butter crackers and juice given.  Will continue to monitor

## 2021-06-26 NOTE — PROGRESS NOTES
Nephrology Progress Note  [phone]   [email or web address].        Reason for Consult:  Hyponatremia, SHAUNNA on CKD     HISTORY OF PRESENT ILLNESS:      This is a patient with a significant past medical history of CAD, COPD, CHF, DM, GERD, HTN, HLD and PVD who has had multiple admissions in the hospital because of decompensated HF. Patient has a known history of CKD stage 3 with a baseline serum creatinine of 1.3-1.6mg/dL. Also recurrent SHAUNNA episodes secondary to cardiorenal syndrome. She is known to have had a renal artery stent in the past as well. Last admission, her serum creatinine went up as high as 4.1mg/dL but improved down to 1.6mg/dL on discharge with diuresis. She presents with dyspnea. Her Cr was 2.2 on 6/17 now 1.7-1.9 ,she has cardiorenal syndrome, now has AFib as new onset      Interval History (Chart/Data reviewed): Appears dry today; reports she only eats one meal per day and diet is normally heavy on fluids. Sna is stable but low again today. Scr higher and K higher. Awaiting pre cert      ROS/  (+) No acute concerns  (-) CP/n/v/d/f/cp/sob  Updated at bedside: Patient, RN  Past medical, family, and social histories were reviewed as previously documented. Updates were made as necessary. Review of Systems ROS with pertinent positives and negatives listed in interval history. PHYSICAL EXAM:    Vitals:    /88   Pulse 101   Temp 97 °F (36.1 °C) (Oral)   Resp 20   Ht 5' 4\" (1.626 m)   Wt 262 lb 12.8 oz (119.2 kg)   LMP 10/24/2012   SpO2 95%   BMI 45.11 kg/m²   I/O last 3 completed shifts: In: 1080 [P.O.:1080]  Out: 1500 [Urine:1500]  No intake/output data recorded. Physical Exam:  Gen: NAD  HEENT: Sclera anicteric, MMM  Neck: Supple. No JVD  CV: Irregularly irregular, S1/S2  Pulm: CTAB/L  Abd: Soft, NT, Morbidly Obese  Ext: 2+ pitting edema in B/L LE  Neuro: Awake/Alert, Answers questions appropriately  Skin: Warm.  Scattered ecchymoses/lesions  Psych: Normal affect/mood      DATA:    CBC:   Lab Results   Component Value Date    WBC 9.1 06/25/2021    RBC 4.83 06/25/2021    HGB 14.1 06/25/2021    HCT 42.6 06/25/2021    MCV 88.0 06/25/2021    MCH 29.2 06/25/2021    MCHC 33.2 06/25/2021    RDW 16.2 06/25/2021     06/25/2021    MPV 8.8 06/25/2021     BMP:    Lab Results   Component Value Date     06/26/2021    K 4.8 06/26/2021    CL 83 06/26/2021    CO2 26 06/26/2021    BUN 75 06/26/2021    LABALBU 4.0 06/21/2021    CREATININE 2.0 06/26/2021    CALCIUM 9.4 06/26/2021    GFRAA 31 06/26/2021    LABGLOM 26 06/26/2021    GLUCOSE 69 06/26/2021       IMPRESSION/RECOMMENDATIONS:      SHAUNNA on CKD:  -Attributed to CRS  -Improved with diuresis, but now stable at 1.8-1.9mg/dL  -Continue to hold diuretics as below    CKD 3:  -Attributed to recurrent AKIs (CHF/CRS) and low nephron mass (L HEMAL s/p angiogram complicated by perinephric hematoma requiring coil embolization of L subsegmental renal artery)  -Baseline Cr 1.3-1.6mg/dL, though has likely settled closer to 1.6-1.8mg/dL after recent SHAUNNA  -Follows with Dr. Ethel Barton    Acute on Chronic Hyponatremia:  -Chronic hyponatremia likely due to CHF. As high as 136mmol/L in 5/2021 (and 135mmol/L earlier this month), so doubt baseline is 127-128mmol/L  -Attributed to CHF/SIADH, but Na+ worsening with diuresis, which is more suggestive of diminished renal perfusion  -S/p tolvaptan/3%  -Check SOsm, Kay+, UOsm today  -Continue to hold diuretics and further doses of tolvaptan today pending above work up  -Monitor Na+ levels [id number]    Hypokalemia:  -Now hyperkalemic; (6/26/21) Spironolactone and K supplement held; IVF;  Pecolia Edman    Hypertension:  -At goal  -Continue current regimen    AFib:  -On beta-blocker  -Management per Cardiology    Acute on Chronic Systolic CHF Exacerbation:  -Hold diuresis as above  -Management per Cardiology    Hyperkalemia  - DC Spironolactone      Recommendations:  Sna worse; appears dry K up.   (6/26/21) DC potassium supplements, DC Spironolactone, should not resume Spironolactone on DC, should not resume metolazone on DC  (6/26/2021) Nutrition consult; encouraged protein intake. Uric acid very high not suggestive of SIADH, Urine Na < 20 suggestive of volume depletion  (6/26/21) Compression hose for LE edema, LE elevation; check TSH/Cortisol ordered. Add 1.8 L fluid restrict    Trial tolvaptan + IVF     Will add one dose of Lokelma     Drinks > 64 oz of water , 64 oz of Soda  Doesn't eat much per report; mostly appears tea and toast diet with high fluid gain  Metolazone and Spironolactone may add to her chronic low sodium. Would treat with loop diuretics to avoid hyponatremia  Hyperkalemia suggestive of volume depletion. CXR Clear, ?  RHF  May benefit from [place] Wt: Weight: 249 lb (112.9 kg)   Todays Wt: Weight: 262 lb 12.8 oz (119.2 kg)     Most Recent Wt: Weight: 262 lb 12.8 oz (119.2 kg)        Wt Readings from Last 10 Encounters:   06/26/21 262 lb 12.8 oz (119.2 kg)   06/21/21 245 lb (111.1 kg)   06/17/21 245 lb (111.1 kg)   06/17/21 245 lb 12.8 oz (111.5 kg)   06/07/21 223 lb (101.2 kg)   04/26/21 212 lb 4.8 oz (96.3 kg)   03/16/21 225 lb (102.1 kg)   02/22/21 202 lb 6.4 oz (91.8 kg)   01/14/21 236 lb 1.6 oz (107.1 kg)   12/22/20 206 lb 9.1 oz (93.7 kg)   ]

## 2021-06-27 NOTE — PROGRESS NOTES
Hospitalist Progress Note      PCP: Nick Storm PA-C    Date of Admission: 6/21/2021    Chief Complaint: SOB    Hospital Course: Patient is 66-year-old female with history of CAD, CABG x1, ischemic cardiomyopathy, CHF s/p CardioMEMS implant 2/2019, COPD, PAD, CKD stage III presented to [facility] ER with complaint of shortness of breath and weight gain of 30 pounds. Patient reported that she has been noncompliant with her diet having a lot of salt in food. Patient was noted to be in A. fib with RVR on telemetry. Podiatry was consulted for chronic venous ulcers. Patient had abnormal artery duplex vascular surgery was consulted recommended Follow-up with Dr. Milo Rubinstein as an outpatient    Subjective    Patient states she has not been eating much was hypoglycemic overnight. Denies any shortness of breath,  Patient weight gain is 267 pound unable to sure about accuracy. Chest x-ray without any signs of volume overload.       Medications:  Reviewed    Infusion Medications    furosemide (LASIX) 1mg/ml infusion 5 mg/hr (06/27/21 1207)    dextrose       Scheduled Medications    hydrALAZINE  10 mg Oral 3 times per day    isosorbide dinitrate  10 mg Oral TID    Vitamin D  1,000 Units Oral Daily    [Held by provider] torsemide  100 mg Oral Daily    metoprolol tartrate  50 mg Oral BID    budesonide  0.5 mg Nebulization BID    And    Arformoterol Tartrate  15 mcg Nebulization BID    ARIPiprazole  15 mg Oral Daily    benztropine  1 mg Oral Nightly    busPIRone  30 mg Oral BID    doxepin  25 mg Oral Nightly    lamoTRIgine  200 mg Oral BID    methocarbamol  500 mg Oral BID    [Held by provider] insulin glargine  30 Units Subcutaneous Nightly    insulin lispro  0-12 Units Subcutaneous TID WC    apixaban  5 mg Oral BID    atorvastatin  80 mg Oral Nightly    clopidogrel  75 mg Oral Daily    pantoprazole  40 mg Oral BID AC     PRN Meds: melatonin, oxyCODONE-acetaminophen, glucose, dextrose, glucagon (rDNA), dextrose      Intake/Output Summary (Last 24 hours) at 6/27/2021 1213  Last data filed at 6/27/2021 1054  Gross per 24 hour   Intake 1968 ml   Output 525 ml   Net 1443 ml       Physical Exam Performed:    /75   Pulse 64   Temp 97.4 °F (36.3 °C) (Axillary)   Resp 16   Ht 5' 4\" (1.626 m)   Wt 267 lb 12.8 oz (121.5 kg)   LMP 10/24/2012   SpO2 98%   BMI 45.97 kg/m²     General appearance: NAD. Sitting on edge at bed  HEENT: Pupils equal, round, and reactive to light. Conjunctivae/corneas clear. Neck: Supple, with full range of motion. No jugular venous distention. Trachea midline. Respiratory: Bilateral diminished breathing sounds with mild crackles. No wheezes no rhonchi. Cardiovascular: Irregular rhythm with normal F1/J6, grade 2 systolic murmur. Abdomen: Soft, non-tender, non-distended with normal bowel sounds. Musculoskeletal: Bilateral lower extremity 3+ pitting edema  Skin: Chronic venous ulcers on both legs. Neurologic:  Neurovascularly intact without any focal sensory/motor deficits. Cranial nerves: II-XII intact, grossly non-focal.  Psychiatric: Alert and oriented, thought content appropriate, normal insight  Capillary Refill: Brisk,< 3 seconds   Peripheral Pulses: +2 palpable, equal bilaterally       Labs:   Recent Labs     06/25/21  0549 06/26/21  0653 06/27/21  0646   WBC 9.1 10.5 8.1   HGB 14.1 14.3 13.3   HCT 42.6 44.0 40.8    257 310     Recent Labs     06/26/21  1746 06/26/21  1746 06/27/21  0024 06/27/21  0646   *  --  119* 122*   K 5.9*   < > 5.4* 5.1  5.1   CL 82*  --  83* 83*   CO2 26  --  23 21   BUN 80*  --  82* 86*   CREATININE 2.0*  --  2.0* 1.9*   CALCIUM 9.3  --  9.0 9.0   PHOS  --   --   --  4.3    < > = values in this interval not displayed. No results for input(s): AST, ALT, BILIDIR, BILITOT, ALKPHOS in the last 72 hours. No results for input(s): INR in the last 72 hours.   Recent Labs     06/27/21  0646   CKTOTAL 86       Urinalysis: drip today with hydralazine. Initially diuresis was held due to hyponatremia. #Afib with RVR new onset: Currently NSR  Rate controlled did receive digoxin on 6/2020. Anticoagulation with Eliquis. Rate control with metoprolol    #Hyponatremia suspected SIADH in the past.  Did received Smasca/hypertonic saline. Nephro don't believe its SIADH now. #CKD stage III  Creatinine 1.7-1.9 appears to be baseline  Nephrology consulted. #Diabetes mellitus type 2  Continue to hold Lantus and bedtime Humalog due to hypoglycemia. Continue insulin sliding scale. HbA1c 11.9% 5/20/2021  Will add nutritional supplements. #CAD  #CABG x1. Continue aspirin, Plavix, statin, beta-blocker. #Chronic venous ulcers  Podiatry consulted recs no intervention      DVT Prophylaxis: Heparin  Diet: Adult Oral Nutrition Supplement; Protein Modular  ADULT DIET; Regular; Low Sodium (2 gm); 1500 ml  Adult Oral Nutrition Supplement; Diabetic Oral Supplement  Code Status: Prior    PT/OT Eval Status: In progress    Dispo - inpatient. Diuresis.  precert was started on Brian Mccollum MD

## 2021-06-27 NOTE — PROGRESS NOTES
Nephrology Progress Note  [phone]   [email or web address] [place].        Reason for Consult:  Hyponatremia, SHAUNNA on CKD     HISTORY OF PRESENT ILLNESS:      This is a patient with a significant past medical history of CAD, COPD, CHF, DM, GERD, HTN, HLD and PVD who has had multiple admissions in the hospital because of decompensated HF. Patient has a known history of CKD stage 3 with a baseline serum creatinine of 1.3-1.6mg/dL. Also recurrent SHAUNNA episodes secondary to cardiorenal syndrome. She is known to have had a renal artery stent in the past as well. Last admission, her serum creatinine went up as high as 4.1mg/dL but improved down to 1.6mg/dL on discharge with diuresis. She presents with dyspnea. Her Cr was 2.2 on 6/17 now 1.7-1.9 ,she has cardiorenal syndrome, now has AFib as new onset      Interval History (Chart/Data reviewed): Unclear accuracy of weights, discussed with RN this morning if possible will get standing weight. Her weight is 260 lb, CXR is not that overwhelming and seems to have worsening SNa with diuresis, with decreased perfusion. I laid her head of her bed down and she was not sob? Will obtain standing weight. ROS/  (+) LE edema  (-) CP/n/v/d/f/cp/sob  Updated at bedside: Patient, RN  Past medical, family, and social histories were reviewed as previously documented. Updates were made as necessary. Review of Systems ROS with pertinent positives and negatives listed in interval history. PHYSICAL EXAM:    Vitals:    /76   Pulse 74   Temp 97 °F (36.1 °C) (Axillary)   Resp 18   Ht 5' 4\" (1.626 m)   Wt 267 lb 12.8 oz (121.5 kg)   LMP 10/24/2012   SpO2 99%   BMI 45.97 kg/m²   I/O last 3 completed shifts: In: 1968 [P.O.:1170;  I.V.:798]  Out: 650 [Urine:650]  I/O this shift:  In: 120 [P.O.:120]  Out: 300 [Urine:300]     Wt Readings from Last 10 Encounters:   06/27/21 267 lb 12.8 oz (121.5 kg)   06/21/21 245 lb (111.1 kg)   06/17/21 245 lb (111.1 kg)   06/17/21 245 lb 12.8 oz (111.5 kg)   06/07/21 223 lb (101.2 kg)   04/26/21 212 lb 4.8 oz (96.3 kg)   03/16/21 225 lb (102.1 kg)   02/22/21 202 lb 6.4 oz (91.8 kg)   01/14/21 236 lb 1.6 oz (107.1 kg)   12/22/20 206 lb 9.1 oz (93.7 kg)   ]    Physical Exam:  Gen: NAD  HEENT: Sclera anicteric, MMM  Neck: Supple. No JVD  CV: Irregularly irregular, S1/S2  Pulm: CTAB/L  Abd: Soft, NT, Morbidly Obese  Ext: 2+ pitting edema in B/L LE  Neuro: Awake/Alert, Answers questions appropriately  Skin: Warm. Scattered ecchymoses/lesions  Psych: Normal affect/mood      DATA:    CBC:   Lab Results   Component Value Date    WBC 8.1 06/27/2021    RBC 4.64 06/27/2021    HGB 13.3 06/27/2021    HCT 40.8 06/27/2021    MCV 88.0 06/27/2021    MCH 28.6 06/27/2021    MCHC 32.5 06/27/2021    RDW 16.5 06/27/2021     06/27/2021    MPV 9.5 06/27/2021     BMP:    Lab Results   Component Value Date     06/27/2021    K 5.1 06/27/2021    K 5.1 06/27/2021    CL 83 06/27/2021    CO2 21 06/27/2021    BUN 86 06/27/2021    LABALBU 3.5 06/27/2021    CREATININE 1.9 06/27/2021    CALCIUM 9.0 06/27/2021    GFRAA 33 06/27/2021    LABGLOM 27 06/27/2021    GLUCOSE 66 06/27/2021       IMPRESSION/RECOMMENDATIONS:      SHAUNNA on CKD:  -Attributed to CRS  -Improved with diuresis, but now stable at 1.8-1.9mg/dL    CKD 3:  -Attributed to recurrent AKIs (CHF/CRS) and low nephron mass (L HEMAL s/p angiogram complicated by perinephric hematoma requiring coil embolization of L subsegmental renal artery)  -Baseline Cr 1.3-1.6mg/dL, though has likely settled closer to 1.6-1.8mg/dL after recent SHAUNNA  -Follows with Dr. Alessia Adam    Acute on Chronic Hyponatremia:  -Chronic hyponatremia likely due to CHF.  As high as 136mmol/L in 5/2021 (and 135mmol/L earlier this month), so doubt baseline is 127-128mmol/L  -Attributed to CHF/SIADH, but Na+ worsening with diuresis, which is more suggestive of diminished renal perfusion  -S/p tolvaptan/3%  -Check SOsm, Kay+, UOsm today  -Continue to hold diuretics and further doses of tolvaptan today pending above work up  -Monitor Na+ levels [id number]    Hypokalemia:  -Now hyperkalemic; (6/26/21) Spironolactone and K supplement held; IVF; Kae Hines    Hypertension:  -At goal  -Continue current regimen    AFib:  -On beta-blocker  -Management per Cardiology    Acute on Chronic Systolic CHF Exacerbation:  -Hold diuresis as above  -Management per Cardiology    Hyperkalemia  - DC Spironolactone      Recommendations:  (6/26/21) DC potassium supplements, DC Spironolactone, should not resume Spironolactone on DC, should not resume metolazone on DC  (6/26/2021) Nutrition consult; encouraged protein intake. Uric acid very high not suggestive of SIADH, Urine Na < 20 suggestive of volume depletion  (6/26/21) added fluid restrict 1.8L, Tolvaptan, Lokelma, gave some gentle fluid and Sna is better?  (6/27/21) Sosm is normal, r/o pseudohyponatremia, Uric acid > 16, TSH normal.  Drinks > 64 oz of water , 64 oz of Soda  Doesn't eat much per report; mostly appears tea and toast diet with high fluid gain  Metolazone and Spironolactone may add to her chronic low sodium. Would treat with loop diuretics to avoid hyponatremia      May need RHC or Dobutamine and Lasix   Will trial Hydralazine/Isordil + Lasix gtt     If SNa worsens with this would consider RHC/inotropes to help her LE edema.     Seems right heart failure    Admit Wt: Weight: 249 lb (112.9 kg)   Todays Wt: Weight: 267 lb 12.8 oz (121.5 kg)     Most Recent Wt: Weight: 267 lb 12.8 oz (121.5 kg)        Wt Readings from Last 10 Encounters:   06/27/21 267 lb 12.8 oz (121.5 kg)   06/21/21 245 lb (111.1 kg)   06/17/21 245 lb (111.1 kg)   06/17/21 245 lb 12.8 oz (111.5 kg)   06/07/21 223 lb (101.2 kg)   04/26/21 212 lb 4.8 oz (96.3 kg)   03/16/21 225 lb (102.1 kg)   02/22/21 202 lb 6.4 oz (91.8 kg)   01/14/21 236 lb 1.6 oz (107.1 kg)   12/22/20 206 lb 9.1 oz (93.7 kg)   ]

## 2021-06-27 NOTE — PROGRESS NOTES
[facility]   Cardiology  Note   Dr Kimberlyn Jim MD, Cinthia Victorlars FNP APRN CVNP  Date: 6/27/2021  Admit Date: 6/21/2021       CC:AHF     Interval Hx /  Subjective: Today, she is resting quietly / VSS wt 262#  Net - 2.8 liters   sCr improved at 1.9   Replace Vit D / 13.5 very low     ProBNP 11K >>3.5K 6/26/2021 declining   meds eliquis lipitor plavix isordil  Lopressor /  Started on lasix gtt 5mg hour hold oral Demadex     PMH: CAD s/p PCI to LAD and circumflex 2018, s/p CABG x1 vessel (LIMA to the LAD, 01/2020), HFrEF due to HOSP DEL MAESTRO, s/p CardioMEMS implant (02/2019), moderate MR, PAD s/p RA stent, CKD, DM, COPD, chronic hyponatremia, chronic leg wounds low vit D   Echo 04/2021: Normal LV size, LVEF 56%, diastolic grade 3, moderate MR, RV dilatation with dysfunction, RVSP 52 (RAP 8).     R/LHC 08/2020: RA 15, PA 69/20, W 19, CO 5.4, EDP 22. LIMA to LAD patent with coronaries still due to left subclavian artery stenosis. Patient seen and examined. Clinical notes reviewed.  Telemetry reviewed / testing reviewed  >29 minutes   Pertinent labs, diagnostic, device, and imaging results reviewed as a part of this visit  EKG TTE stress test: any LHC/RHC reviewed     Past Medical History:  Past Medical History:   Diagnosis Date    Acute blood loss anemia 8/5/2020    Acute kidney injury ([address] 12/25/2019    Acute kidney injury superimposed on CKD ([address] 8/5/2020    Acute on chronic combined systolic and diastolic congestive heart failure ([address] 1/10/2020    Acute on chronic right-sided heart failure ([address] 08/2020    Alcohol dependence ([address] 3/10/2010    Arthritis     Asthma     CAD (coronary artery disease)     Cellulitis 6/3/2020    COPD (chronic obstructive pulmonary disease) ([address]     Diabetic ulcer of left foot associated with type 2 diabetes mellitus, limited to breakdown of skin ([address] 1/18/2020    Diabetic ulcer of toe of right foot associated with type 2 diabetes mellitus ([facility] 1/18/2020    Left renal artery stenosis ([facility] 08/2020    MI (myocardial infarction) ([facility] 10/07/2019    NSTEMI    Positive FIT (fecal immunochemical test) 2/28/2020    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 steal syndrome 09/01/2020    Traumatic perinephric hematoma, left, initial encounter 8/4/2020          Scheduled Meds:   furosemide  60 mg Intravenous Once    hydrALAZINE  10 mg Oral 3 times per day    isosorbide dinitrate  10 mg Oral TID    [Held by provider] torsemide  100 mg Oral Daily    metoprolol tartrate  50 mg Oral BID    budesonide  0.5 mg Nebulization BID    And    Arformoterol Tartrate  15 mcg Nebulization BID    ARIPiprazole  15 mg Oral Daily    benztropine  1 mg Oral Nightly    busPIRone  30 mg Oral BID    doxepin  25 mg Oral Nightly    lamoTRIgine  200 mg Oral BID    methocarbamol  500 mg Oral BID    [Held by provider] insulin glargine  30 Units Subcutaneous Nightly    insulin lispro  0-12 Units Subcutaneous TID WC    apixaban  5 mg Oral BID    atorvastatin  80 mg Oral Nightly    clopidogrel  75 mg Oral Daily    pantoprazole  40 mg Oral BID AC     Continuous Infusions:   furosemide (LASIX) 1mg/ml infusion      dextrose       PRN Meds:melatonin, oxyCODONE-acetaminophen, glucose, dextrose, glucagon (rDNA), dextrose       Physical Examination:  Vitals:    06/27/21 1016   BP:    Pulse:    Resp: 18   Temp:    SpO2: 99%      In: 5163 [P.O.:1020;  I.V.:798]  Out: 525    Wt Readings from Last 3 Encounters:   06/27/21 267 lb 12.8 oz (121.5 kg)   06/21/21 245 lb (111.1 kg)   06/17/21 245 lb (111.1 kg)       Intake/Output Summary (Last 24 hours) at 6/27/2021 1131  Last data filed at 6/27/2021 1054  Gross per 24 hour   Intake 1968 ml   Output 525 ml   Net 1443 ml       Telemetry: Personally Reviewed    · Constitutional: Cooperative and in no apparent distress, and appears well nourished  · Skin: Warm and pink; no pallor, cyanosis, clubbing, or bruising   · HEENT: Symmetric and normocephalic. ·   · Cardiovascular: Regular rate and rhythm. A7/S4 present  systolic murmur   · peripheral edema  · Respiratory: Respirations symmetric and unlabored. Lungs clear to auscultation bilaterally, no wheezing, crackles, or rhonchi  · Gastrointestinal: Abdomen soft and round. Bowel sounds normoactive without tenderness or masses. · Musculoskeletal: Bilateral upper and lower extremity strength 5/5 with full ROM  · Neurologic/Psych: Awake and orientated to person, place and time.  Calm affect, appropriate mood    Patient Active Problem List    Diagnosis Date Noted    Mitral valve regurgitation 05/24/2018    CAD (coronary artery disease)     Heart failure ([address] 06/21/2021    Cellulitis 04/22/2021    Closed nondisplaced fracture of navicular bone of left foot     Acute on chronic systolic CHF (congestive heart failure) ([address] 43/82/8667    Acute systolic CHF (congestive heart failure) ([address] 01/11/2021    CHF (congestive heart failure), NYHA class I, acute on chronic, combined ([address] 01/11/2021    DKA, type 2, not at goal [facility]) 12/21/2020    Moderate protein-calorie malnutrition ([address] 12/01/2020    Dyspnea on exertion     Ischemic heart disease     Hypokalemia 11/30/2020    CKD (chronic kidney disease) stage 4, GFR 15-29 ml/min ([facility])     Acute kidney injury superimposed on CKD ([address] 08/05/2020    Morbid obesity with BMI of 40.0-44.9, adult ([address] 08/05/2020    PAD (peripheral artery disease) ([address]     Peripheral venous insufficiency 07/02/2020    Chronic renal impairment     Lower extremity edema 06/22/2020    Habitual self-excoriation 06/22/2020    Ulcer of left lower leg, limited to breakdown of skin ([address] 06/22/2020    Ulcer of right leg, limited to breakdown of skin ([address] 06/22/2020    Arthritis     Cardiomyopathy ([address]     Pulmonary hypertension ([address]     Acute on chronic congestive heart failure ([address] 02/28/2020    Pulmonary nodule 02/28/2020    Class 2 severe obesity due to excess calories with serious comorbidity and body mass index (BMI) of 39.0 to 39.9 in adult [facility]) 02/28/2020    Bipolar disorder ([facility] 02/28/2020    Chronic systolic CHF (congestive heart failure) ([facility] 02/21/2020    Dyslipidemia     Vitamin D deficiency 10/29/2019    SHAUNNA (acute kidney injury) ([facility] 09/29/2019    COPD (chronic obstructive pulmonary disease) ([facility] 05/17/2019    Hyponatremia 05/23/2018    Iron deficiency anemia 05/23/2018    GERD (gastroesophageal reflux disease)     Anxiety and depression     Type 2 diabetes mellitus with diabetic polyneuropathy, with long-term current use of insulin ([facility] 12/10/2015    CLINT (obstructive sleep apnea) 12/10/2015    Tobacco abuse disorder 12/10/2015    Abel's esophagus 04/29/2013    Essential hypertension 04/13/2011    Viral hepatitis C 03/10/2010      Assessment      CAD  /  stable  / on BB/ statin / antiplatelet   s/p PCI to LAD and circumflex 2018,   s/p CABG x1 vessel (LIMA to the LAD, 01/2020),      Moderate MR   Echo 04/2021: Normal LV size, LVEF 59%, diastolic grade 3, moderate MR, RV dilatation with dysfunction, RVSP 52 (RAP 8).   R/LHC 08/2020: RA 15, PA 69/20, W 19, CO 5.4, EDP 22.  LIMA to LAD patent with coronaries still due to left subclavian artery stenosis     HFrEF due to HOSP DEL MAESTRO,   s/p CardioMEMS implant (02/2019)      moderate MR  Follow       PAD   s/p RA stent  Stable       URSULA CKD   1.9   neph following      DMT2  Managed per IM       COPD  Stable      chronic hyponatremia     chronic leg wounds     low vit D   start supplement     Hyperkalemia  DC Spironolactone per neph     Plan   Per Dr Pradeep Kaye: unable to start ACEI/ARB/spironolactone or Entresto due to CKD (allergy to lisino is severe hypotension, likely not a true allergy, rather a dose effect)  ICM  / no AICD at this time   wt 262# / Net - 2.8 liters   sCr stable at 1.9   meds eliquis lipitor plavix isordil  Lopressor /  Started on lasix gtt 5mg hour   hold oral Demadex   Planning discharge pending precert  / suspect this will be delayed for few days      Thank you for allowing to us to participate in the care of [facility].   Sebastian Conception APRN-CNP-CVNP    [place]

## 2021-06-28 NOTE — PROGRESS NOTES
=    Hospitalist Progress Note      PCP: Randolph Mendoza PA-C    Date of Admission: 6/21/2021    Chief Complaint: SOB    Hospital Course: Patient is 43-year-old female with history of CAD, CABG x1, ischemic cardiomyopathy, CHF s/p CardioMEMS implant 2/2019, COPD, PAD, CKD stage III presented to [facility] ER with complaint of shortness of breath and weight gain of 30 pounds. Patient reported that she has been noncompliant with her diet having a lot of salt in food. Patient was noted to be in A. fib with RVR on telemetry. Podiatry was consulted for chronic venous ulcers. Patient had abnormal artery duplex vascular surgery was consulted recommended Follow-up with Dr. Efrain Baldwin as an outpatient    Subjective  Patient seems sleepy but arousable. Denies any complaints.     Medications:  Reviewed    Infusion Medications    furosemide (LASIX) 1mg/ml infusion 5 mg/hr (06/28/21 0601)    dextrose       Scheduled Medications    hydrALAZINE  10 mg Oral 3 times per day    isosorbide dinitrate  10 mg Oral TID    Vitamin D  1,000 Units Oral Daily    albumin human  12.5 g Intravenous Q8H    [Held by provider] torsemide  100 mg Oral Daily    metoprolol tartrate  50 mg Oral BID    budesonide  0.5 mg Nebulization BID    And    Arformoterol Tartrate  15 mcg Nebulization BID    ARIPiprazole  15 mg Oral Daily    benztropine  1 mg Oral Nightly    busPIRone  30 mg Oral BID    doxepin  25 mg Oral Nightly    lamoTRIgine  200 mg Oral BID    methocarbamol  500 mg Oral BID    [Held by provider] insulin glargine  30 Units Subcutaneous Nightly    insulin lispro  0-12 Units Subcutaneous TID WC    apixaban  5 mg Oral BID    atorvastatin  80 mg Oral Nightly    clopidogrel  75 mg Oral Daily    pantoprazole  40 mg Oral BID AC     PRN Meds: melatonin, oxyCODONE-acetaminophen, glucose, dextrose, glucagon (rDNA), dextrose      Intake/Output Summary (Last 24 hours) at 6/28/2021 1239  Last data filed at 6/28/2021 0901  Gross per 24 hour   Intake 2066 ml   Output 2750 ml   Net -684 ml       Physical Exam Performed:    BP 97/63   Pulse 71   Temp 97.5 °F (36.4 °C) (Oral)   Resp 18   Ht 5' 4\" (1.626 m)   Wt 267 lb 10.2 oz (121.4 kg)   LMP 10/24/2012   SpO2 97%   BMI 45.94 kg/m²     General appearance: NAD. Sitting on edge at bed  HEENT: Pupils equal, round, and reactive to light. Conjunctivae/corneas clear. Neck: Supple, with full range of motion. No jugular venous distention. Trachea midline. Respiratory: Bilateral diminished breathing sounds with mild crackles. No wheezes no rhonchi. Cardiovascular: Irregular rhythm with normal W6/B1, grade 2 systolic murmur. Abdomen: Soft, non-tender, non-distended with normal bowel sounds. Musculoskeletal: Bilateral lower extremity 3+ pitting edema  Skin: Chronic venous ulcers on both legs. Neurologic:  Neurovascularly intact without any focal sensory/motor deficits. Cranial nerves: II-XII intact, grossly non-focal.  Psychiatric: Alert and oriented, thought content appropriate, normal insight  Capillary Refill: Brisk,< 3 seconds   Peripheral Pulses: +2 palpable, equal bilaterally       Labs:   Recent Labs     06/26/21  0653 06/27/21  0646 06/28/21  0714   WBC 10.5 8.1 7.7   HGB 14.3 13.3 12.8   HCT 44.0 40.8 39.0    310 266     Recent Labs     06/27/21  0646 06/27/21  1225 06/27/21  1759 06/28/21  0003 06/28/21  0714   *   < > 122* 123* 127*   K 5.1  5.1   < > 4.5 4.4 3.8   CL 83*   < > 83* 84* 86*   CO2 21   < > 25 24 26   BUN 86*   < > 86* 85* 89*   CREATININE 1.9*   < > 2.4* 2.3* 2.3*   CALCIUM 9.0   < > 9.1 8.8 9.1   PHOS 4.3  --   --   --   --     < > = values in this interval not displayed. No results for input(s): AST, ALT, BILIDIR, BILITOT, ALKPHOS in the last 72 hours. No results for input(s): INR in the last 72 hours.   Recent Labs     06/27/21  0646   CKTOTAL 86       Urinalysis:      Lab Results   Component Value Date    NITRU Negative 06/27/2021    WBCUA None today  Continue hydralazine  Cardiology following    #Afib with RVR new onset: Currently NSR  Rate controlled did receive digoxin on 6/22. Anticoagulation with Eliquis. Rate control with metoprolol    #CAD  #CABG x1. Continue aspirin, Plavix, statin, beta-blocker    #Hyponatremia suspected SIADH in the past  Did received Smasca/hypertonic saline. Nephro don't believe its SIADH now. Secondary to volume overload  Monitor sodium  Continue Lasix drip  Nephrology following    #SHAUNNA on CKD stage IV  Creatinine 1.7-1.9 appears to be baseline  Continue diuresis  Nephrology following    #Diabetes mellitus type 2  HbA1c 11.9% 5/20/2021  Continue to hold Lantus and bedtime Humalog due to hypoglycemia. Continue insulin sliding scale. Continue nutritional supplements. #Chronic venous ulcers  Podiatry consulted, recommended no intervention    DVT Prophylaxis: Heparin  Diet: Adult Oral Nutrition Supplement; Protein Modular  ADULT DIET; Regular; Low Sodium (2 gm); 1500 ml  Adult Oral Nutrition Supplement; Diabetic Oral Supplement  Code Status: Prior    PT/OT Eval Status:  In progress    Dispo -pending diuresis, nephrology/cardiology Aisha Spatz, MD

## 2021-06-28 NOTE — TELEPHONE ENCOUNTER
Called patient to let him know that Janice Gonzalez would like him to start on vitamin d 800 units otc. Patient is aware.

## 2021-06-28 NOTE — CONSULTS
NUTRITION ASSESSMENT  Admission Date: 6/21/2021     Type and Reason for Visit: Reassess, Consult    NUTRITION RECOMMENDATIONS:   1. PO Diet: Continue regular; low sodium; 1500 ml fluid restriction diet  2. ONS: Modify to Yan BID. Continue Glucerna TID. 3. Nutrition Education: Low sodium, HF guidelines, and high protein education provided. 4. Recommend start of bowel regimen - no BM noted since 6/22 per flowsheets    NUTRITION ASSESSMENT:  Nutritional summary & status: RD consulted for ONS and diet education. EMR reflects varying po intakes. No wt loss noted since adm though wt fluctuations likely d/t fluid shifts. No BM noted since 6/22. RD recommending start of bowel regimen. Pt reports that she does not enjoy protein modulars and is agreeable to trying Yan. Pt consuming lunch of [employer]. RD to continue monitoring. Nutrition Education:  RD consulted to provide low sodium, heart failure nutrition therapy, and high protein educations. RD gave in depth review of low sodium recommendations such as not cooking with salt, reading a nutrition label, shopping tips, and foods to avoid. RD discussed fluid restrictions and gave tips on how to monitor fluid intake. RD discussed daily weights to monitor fluid status. Pt reports that she has been educated on recommendations for low sodium/HF many times and was able to recall many tips that she follows. Although noted pt eating lunch from [employer]. RD provided high protein foods list and gave brief review. Left RD phone number with pt and encouraged to call with any questions/concerns. Please re-consult RD as needed for review of information.        Patient admitted d/t: SOB + weight gain    PMH significant for: CAD, CABG x1, ischemic cardiomyopathy, CHF s/p CardioMEMS implant 2/2019, COPD, PAD, CKD III    MALNUTRITION ASSESSMENT  Context of Malnutrition: Acute Illness   Malnutrition Status: No malnutrition  Findings of the 6 clinical characteristics of malnutrition (Minimum of 2 out of 6 clinical characteristics is required to make the diagnosis of moderate or severe Protein Calorie Malnutrition based on AND/ASPEN Guidelines):  Energy Intake: No significant decrease in energy intake    Energy Intake Time: No significant    Weight Loss %: Unable to assess  -d/t fluid shifts  Weight loss Time: Unable to assess     NUTRITION DIAGNOSIS   Problem: Problem #1: Increased nutrient needs   Etiology: Increased demand for nutrient  Signs & Symptoms: Presence of wounds     NUTRITION INTERVENTION  Food and/or Nutrient Delivery: Continue Current Diet, Modify Oral Nutrition Supplement   Nutrition Education/Counseling: Education completed   Coordination of Nutrition Care: Continue to monitor while inpatient     NUTRITION MONITORING & EVALUATION:  Evaluation:Progressing towards goal   Goals: Pt will consistently consume >50% of meals and supplements during admission. Monitoring: Meal Intake , Supplement Intake  or Wound Healing      OBJECTIVE DATA: Significant to nutrition assessment  Nutrition-Focused Physical Findings: +2 pitting edema BLE, LBM noted 6/22 per flowsheets  · Labs: Reviewed;  Na 124  · Meds: Reviewed; Vitamin D  · Wounds Pressure Ulcer      ANTHROPOMETRICS  Current Height: 5' 4\" (162.6 cm)  Current Weight: 267 lb 10.2 oz (121.4 kg)    Admission weight: 249 lb (112.9 kg)  Ideal Bodyweight 120 lbs   Usual Bodyweight GEORGE - wt fluctuations d/t fluid shifts  Weight Changes GEORGE      BMI BMI (Calculated): 46    Wt Readings from Last 50 Encounters:   06/22/21 249 lb (112.9 kg)   06/21/21 245 lb (111.1 kg)   06/17/21 245 lb (111.1 kg)   06/17/21 245 lb 12.8 oz (111.5 kg)   06/07/21 223 lb (101.2 kg)   04/26/21 212 lb 4.8 oz (96.3 kg)   03/16/21 225 lb (102.1 kg)   02/22/21 202 lb 6.4 oz (91.8 kg)   01/14/21 236 lb 1.6 oz (107.1 kg)   12/22/20 206 lb 9.1 oz (93.7 kg)   12/20/20 212 lb (96.2 kg)   12/03/20 218 lb 3.2 oz (99 kg)   11/30/20 208 lb (94.3 kg)   11/11/20 231 lb 3.2 oz (104.9 kg)   11/05/20 236 lb (107 kg)   10/28/20 229 lb 9.6 oz (104.1 kg)   10/21/20 216 lb (98 kg)   10/14/20 219 lb 12.8 oz (99.7 kg)   10/07/20 225 lb 6.4 oz (102.2 kg)   09/30/20 227 lb (103 kg)   09/09/20 224 lb 8 oz (101.8 kg)   09/02/20 225 lb 6.4 oz (102.2 kg)   08/18/20 250 lb (113.4 kg)   08/12/20 255 lb 1.6 oz (115.7 kg)   08/04/20 245 lb (111.1 kg)   07/30/20 243 lb (110.2 kg)   07/23/20 257 lb 9.6 oz (116.8 kg)   07/17/20 253 lb (114.8 kg)   07/16/20 256 lb (116.1 kg)   07/09/20 247 lb (112 kg)   07/08/20 247 lb (112 kg)   07/07/20 248 lb (112.5 kg)   07/03/20 247 lb 3.2 oz (112.1 kg)   06/26/20 240 lb (108.9 kg)   06/25/20 240 lb (108.9 kg)   06/18/20 234 lb (106.1 kg)   06/07/20 244 lb 8 oz (110.9 kg)   05/26/20 245 lb (111.1 kg)   05/12/20 227 lb (103 kg)       COMPARATIVE STANDARDS  Estimated Total Kcals/Day : 8-15 Admission Bodyweight  (113 kg) 340-0215 kcal/day    Estimated Total Protein (g/day) : 1.8-2.0 Ideal Bodyweight  (55 kg)  g/day  Estimated Daily Total Fluid (ml/day): 900-1600 mL per day     Food / Nutrition-Related History  Pre-Admission / Home Diet:  Pre-Admission/Home Diet: Carb Control, Cardiac (noncompliant )   Home Supplements / Herbals:    none noted  Food Restrictions / Cultural Requests:    none noted    Current Nutrition Therapies   Adult Oral Nutrition Supplement; Protein Modular  ADULT DIET; Regular; Low Sodium (2 gm); 1500 ml  Adult Oral Nutrition Supplement; Diabetic Oral Supplement     PO Intake: 1-25%, 26-50%, 51-75% and %   PO Supplement: None   PO Supplement Intake: None   IVF: n/a     NUTRITION RISK LEVEL: Risk Level:  Moderate     Brian Olsen RD, LD  [place]:  [phone]  Office:  [phone]

## 2021-06-28 NOTE — PROGRESS NOTES
Podiatric Surgery Daily Progress Note  Divya Bautista      Subjective :   Patient seen and examined this am at the bedside. Patient denies any acute overnight events. Patient denies N/V/F/C/SOB. Patient denies calf pain, thigh pain, chest pain. Review of Systems: A 12 point review of symptoms is unremarkable with the exception of the chief complaint. Patient specifically denies nausea, fever, vomiting, chills, shortness of breath, chest pain, abdominal pain, constipation or difficulty urinating. Objective     BP 97/63   Pulse 71   Temp 97.5 °F (36.4 °C) (Oral)   Resp 18   Ht 5' 4\" (1.626 m)   Wt 267 lb 12.8 oz (121.5 kg)   LMP 10/24/2012   SpO2 97%   BMI 45.97 kg/m²     I/O:    Intake/Output Summary (Last 24 hours) at 6/28/2021 0611  Last data filed at 6/28/2021 0025  Gross per 24 hour   Intake 1603 ml   Output 1850 ml   Net -247 ml              Wt Readings from Last 3 Encounters:   06/27/21 267 lb 12.8 oz (121.5 kg)   06/21/21 245 lb (111.1 kg)   06/17/21 245 lb (111.1 kg)       LABS:    Recent Labs     06/26/21  0653 06/27/21  0646   WBC 10.5 8.1   HGB 14.3 13.3   HCT 44.0 40.8    310        Recent Labs     06/27/21  0646 06/27/21  1225 06/28/21  0003   *   < > 123*   K 5.1  5.1   < > 4.4   CL 83*   < > 84*   CO2 21   < > 24   PHOS 4.3  --   --    BUN 86*   < > 85*   CREATININE 1.9*   < > 2.3*    < > = values in this interval not displayed. Recent Labs     06/27/21  0646   PROT 6.5           LOWER EXTREMITY EXAMINATION    Dressing to left LE intact. No strikethrough noted to the external dressing. No drainage noted to the internal layers of the dressing. VASCULAR: DP and PT pulses nonpalpable bilateral. Upon hand-held Doppler examination, DP and PT signals monophasic bilateral. CFT is sluggish to the digits of the foot b/l. Skin temperature is warm to cool from proximal to distal with no focal calor noted. +2 pitting edema noted bilateral lower extremity.  No pain with calf compression b/l.     NEUROLOGIC: Gross and epicritic sensation is diminished b/l. Protective sensation is absent at all pedal sites b/l.     DERMATOLOGIC: Diffuse dermatologic changes noted to bilateral lower extremity. Dependent rubor noted to bilateral lower extremity. Multiple full-thickness ulcerations noted to bilateral lower extremity 2/2 combination of arterial and venous insufficiency. No purulent drainage, malodor, erythema, fluctuance, or crepitus noted. Wounds do not probe to bone, tunnel, or track. Right lower extremity: Second digit noted to have dusky appearance. Left lower extremity: Distal aspects of digits 1 through 5 with ischemic changes. MUSCULOSKELETAL: Muscle strength is 5/5 for all pedal groups tested. No pain with palpation of the foot or ankle b/l. Ankle joint ROM is decreased in dorsiflexion with the knee extended. No obvious biomechanical abnormalities. IMAGING:  X-ray tibia-fibula right 6/22/2021  FINDINGS: Diffuse soft tissue swelling. No osseous erosion. No soft tissue gas. Metallic clips consistent with previous vascular surgery.           Impression   1. Diffuse soft tissue swelling without acute osseous abnormality.         X-ray ankle right 6/22/2021  FINDINGS: Soft tissue swelling. No soft tissue gas identified. No periosteal reaction to suggest osteomyelitis.           Impression   1. Soft tissue swelling without significant osseous abnormality otherwise.             X-ray foot right 6/22/2021  FINDINGS: Mineralization appears intact. No acute fracture or dislocation. Moderate calcaneal enthesopathy. First MTP osteoarthritis. Diffuse soft tissue swelling along the distal foot. No convincing evidence of soft tissue gas or osseous erosion.           Impression   1. Soft tissue swelling without acute osseous abnormality. 2. Calcaneal enthesopathy. 3. Polyarticular osteoarthritis.              X-ray tibia-fibula left 6/22/2021  FINDINGS:       No evidence of acute fracture or traumatic bony abnormality is seen.       No gross bony destructive changes are seen.       There is suggestion of diffuse subcutaneous edema which appears similar to prior study.       Ulcer is seen anteriorly in the soft tissues overlying the distal tibial shaft. This is unchanged.           Impression       No evidence of fracture or bony abnormality seen.             X-ray ankle left 6/22/2021  FINDINGS: There appears be an ulceration along the anterior aspect of the lower calf. Extensive calcaneal enthesopathy. Midfoot osteoarthritis with erosive changes and fragmentation of the navicular bone suggesting neuropathic change.           Impression   1. Ulceration of the distal calf with diffuse soft tissue swelling. No evidence for underlying osteomyelitis. 2. Neuropathic change in the midfoot. 3. Calcaneal enthesopathy.             X-ray foot left 6/22/2021  FINDINGS: Diffuse soft tissue swelling over the distal foot. There is fragmentation of the navicular bone and erosive change in the distal talus suggesting neuropathic change. Calcaneal enthesopathy. No soft tissue gas identified. No convincing evidence    of osteomyelitis.           Impression   1. Soft tissue swelling and evidence of neuropathic change in the midfoot. No convincing evidence of osteomyelitis or acute osseous abnormality.             Arterial duplex bilateral lower extremity 6/18/2021  Impressions   Right Impression   The right ankle/brachial index is 0.9 (the DP is 90 and the PT is 100 mmHg). The common femoral artery demonstrates multiphasic flow indicating no   significant aortic-iliac inflow disease. There is a patent proximal SFA - popliteal bypass graft. However, due to body   habitus and depth of vessels, graft was difficult to image. There is abnormal monophasic flow demonstrated in the posterior tibial artery,   anterior tibial artery and peroneal artery.    There is atherosclerotic plaque seen within the common femoral artery,   popliteal artery and calf arteries consistent with <50% stenosis. Left Impression   The left ankle/brachial index is 0.6 (the DP is 60 and the PT is non-audible   mmHg). The common femoral artery demonstrates abnormal monophasic flow indicating   significant aortic-iliac inflow disease. The proximal - distal SFA appears occluded with a non functioning stent. There is abnormal monophasic flow demonstrated in the popliteal artery,   posterior tibial artery and anterior tibial artery. The peroneal artery appears occluded. There is atherosclerotic plaque seen within the common femoral artery, prox -   distal SFA, popliteal artery and calf arteries. Previous exam on 06/26/2020, right ZACH: 0.9 left ZACH: 0.8.       Conclusions        Summary        Normal right ZACH.    Abnormal left ZACH in the range of moderate arterial insufficiency.        Patent right femoropopliteal bypass with distal graft velocity below the    threshold associated with threatened graft patency. No identified graft    abnormality.        Occluded L SFA associated with stent occlusion.          ASSESSMENT/PLAN  -Multiple full-thickness ulcerations, bilateral lower extremity, likely 2/2 combination of arterial and venous insufficiency  -Peripheral arterial disease, bilateral lower extremity  -Venous insufficiency, bilateral lower extremity  -Diabetes mellitus type 2 with peripheral neuropathy, bilateral lower extremity  -Charcot neuroarthropathy, left foot     -Patient examined and evaluated at the bedside   -VSS. No leukocytosis noted. -CRP 48.8, ESR 41  -X-rays reviewed and impression noted above  -Arterial duplex reviewed and impression noted above  -Debridement deferred due to peripheral arterial disease  -Betadine applied to distal digits left foot and wounds b/l LE  -Mepilex border applied to left leg wound  -Antibiotics not indicated at this time from podiatric standpoint  -Nonweightbearing left leg.

## 2021-06-28 NOTE — CARE COORDINATION
SW Following, spoke with Zane Nielsen [phone] at [facility], Pt's precert was approved    Electronically signed by KATIE Bernal, TIRSOW on 6/28/2021 at 9:21 [phone]

## 2021-06-28 NOTE — PROGRESS NOTES
Cardiology Consult Service  Daily Progress Note        Admit Date:  6/21/2021  Primary cardiologist: Dr Costa Cronin (follows with Vincent Garcia CNP)    Reason for Consultation/Chief Complaint: AHF    Subjective:      Michael Lopez is a 60 y.o. female with a past medical history of CAD s/p PCI to LAD and circumflex 2018, s/p CABG x1 vessel (LIMA to the LAD, 01/2020), HFrEF due to [facility], s/p CardioMEMS implant (02/2019), moderate MR, PAD s/p RA stent, CKD, DM, COPD, chronic hyponatremia, chronic leg wounds.     Echo 04/2021: Normal LV size, LVEF 57%, diastolic grade 3, moderate MR, RV dilatation with dysfunction, RVSP 52 (RAP 8).     R/LHC 08/2020: RA 15, PA 69/20, W 19, CO 5.4, EDP 22. LIMA to LAD patent with coronaries still due to left subclavian artery stenosis.     ECG 04/2021: NSR with PACs.     Home medications torsemide 100 mg daily, metolazone 5 mg 3 times a week, spironolactone 50 mg daily     Patient presented to the emergency room with weight gain of 30 pounds, worsening shortness of breath and lower extremity edema. Patient admits to excess salt in her diet. Potassium was 2.8. ECG revealed AF with RVR, right bundle branch block, inferior, anterior, lateral MI. She was admitted for acute heart failure, hypokalemia, PAFRVR. Patient has not been started on any IV diuretics due to persistent hypokalemia currently being treated with IV and p.o. potassium. She remains off supplemental oxygen. Creatinine down to 1.7 from 2.0. Chest x-ray unremarkable. Interval history:  Patient reports improvement with her symptoms. I's and O's +1.2 L, weight even, creatinine stable around 2.3, potassium 3.8, sodium increased at 127 ([facility] 6/26). Patient remains off supplemental oxygen.     Objective:     Medications:   hydrALAZINE  10 mg Oral 3 times per day    Vitamin D  1,000 Units Oral Daily    albumin human  12.5 g Intravenous Q8H    metoprolol tartrate  50 mg Oral BID    budesonide  0.5 mg Nebulization BID    And    Arformoterol Tartrate  15 mcg Nebulization BID    ARIPiprazole  15 mg Oral Daily    benztropine  1 mg Oral Nightly    busPIRone  30 mg Oral BID    doxepin  25 mg Oral Nightly    lamoTRIgine  200 mg Oral BID    methocarbamol  500 mg Oral BID    [Held by provider] insulin glargine  30 Units Subcutaneous Nightly    insulin lispro  0-12 Units Subcutaneous TID WC    apixaban  5 mg Oral BID    atorvastatin  80 mg Oral Nightly    clopidogrel  75 mg Oral Daily    pantoprazole  40 mg Oral BID AC       IV drips:   furosemide (LASIX) 1mg/ml infusion 15 mg/hr (06/28/21 1220)    dextrose         PRN:  melatonin, oxyCODONE-acetaminophen, glucose, dextrose, glucagon (rDNA), dextrose    Vitals:    06/28/21 0607 06/28/21 0955 06/28/21 1147 06/28/21 1231   BP:  121/76  107/68   Pulse:  77     Resp:  18 18 16   Temp:  96.8 °F (36 °C)     TempSrc:  Oral     SpO2:   95%    Weight: 267 lb 10.2 oz (121.4 kg)      Height:           Intake/Output Summary (Last 24 hours) at 6/28/2021 1457  Last data filed at 6/28/2021 1113  Gross per 24 hour   Intake 1646 ml   Output 3575 ml   Net -1929 ml     I/O last 3 completed shifts:   In: 1946 [P.O.:940; I.V.:1006]  Out: 2250 [Urine:2250]  Wt Readings from Last 3 Encounters:   06/28/21 267 lb 10.2 oz (121.4 kg)   06/21/21 245 lb (111.1 kg)   06/17/21 245 lb (111.1 kg)       Admit Wt: Weight: 249 lb (112.9 kg)   Todays Wt: Weight: 267 lb 10.2 oz (121.4 kg)    TELEMETRY: Atrial fibrillation with CVR 70s    Physical Exam:         General Appearance:  Alert, cooperative, no distress, appears stated age Appropriate weight   Head:  Normocephalic, without obvious abnormality, atraumatic   Eyes:  PERRL, conjunctiva/corneas clear EOM intact  Ears normal   Throat no lesions       Nose: Nares normal, no drainage or sinus tenderness   Throat: Lips, mucosa, and tongue normal   Neck: Supple, symmetrical, trachea midline, no adenopathy, thyroid: not enlarged, symmetric, no tenderness/mass/nodules, no carotid bruit. Lungs:   Normal respiratory rate, lungs mild basilar ronchi bilaterally. Chest Wall:  No tenderness or deformity   Heart:  Irregular rhythm, rate is controlled, S1, S2 normal, there is no murmur, there is no rub or gallop, cannot assess jvd, 2+ bilateral lower extremity edema   Abdomen:   Soft, non-tender, bowel sounds active all four quadrants,  no masses, no organomegaly       Extremities: Extremities normal, atraumatic, no cyanosis. Pulses: 2+ and symmetric   Skin: Skin color, texture, turgor normal, no rashes or lesions   Pysch: Normal mood and affect   Neurologic: Normal gross motor and sensory exam.  Cranial nerves intact       Labs:   Recent Labs     06/27/21  0646 06/27/21  1225 06/28/21  0003 06/28/21  0714 06/28/21  1344   *   < > 123* 127* 124*   K 5.1  5.1   < > 4.4 3.8 4.6   BUN 86*   < > 85* 89* 84*   CREATININE 1.9*   < > 2.3* 2.3* 2.2*   CL 83*   < > 84* 86* 84*   CO2 21   < > 24 26 25   GLUCOSE 66*   < > 83 70 89   CALCIUM 9.0   < > 8.8 9.1 8.9   MG 2.60*  --   --   --   --     < > = values in this interval not displayed. Recent Labs     06/26/21  0653 06/26/21  0653 06/27/21  0646 06/27/21  0646 06/28/21  0714   WBC 10.5  --  8.1  --  7.7   HGB 14.3  --  13.3  --  12.8   HCT 44.0  --  40.8  --  39.0     --  310  --  266   MCV 88.7   < > 88.0   < > 87.3    < > = values in this interval not displayed. Recent Labs     06/27/21  0646   TRIG 51   HDL 32*     No results for input(s): PTT, INR in the last 72 hours. Invalid input(s): PT  Recent Labs     06/27/21  0646   CKTOTAL 86     No results for input(s): BNP in the last 72 hours. No results for input(s): NTPROBNP in the last 72 hours. Recent Labs     06/27/21  0646   TSH 5.67*       Imaging:       Assessment & Plan:     1. Acute on chronic HFrEF:  It is ischemic in etiology. Patient is volume overloaded but hemodynamically stable.   It is most likely due to excess dietary salt. Patient reports compliance with medications.    -Unable to use spironolactone due to SHAUNNA/CKD. -Will increase Lasix drip (started 6/27 at 5 mg/h) to 10 mg/h today  -Avoid metolazone due to severe hypokalemia.  -Frequent BMP check and correction of potassium  -Cw Lopressor 25 twice daily (in view of low normal BP and AF RVR). - Unable to start ACEI/ARB/spironolactone or Entresto due to CKD (allergy to lisino is severe hypotension, likely not a true allergy, rather a dose effect)  -Patient does not have an ICD  - Strict I's and O's every shift and standing weights if possible, low-salt diet and daily BMP with reflex to Mg, wean supplemental oxygen to off for sats greater than 94%. - Agree with hydralazine 10 q 8 for afterload reduction, will stop isordil due to low Bps.      2. PAF with RVR:  This is a new diagnosis for patient. It could be precipitating her acute heart failure.     -We will treat with beta-blockers  -Patient received digoxin 250 mcg IV x1 on 6/22, rate is now controlled. -Now on Eliquis.     3. CAD s/p stents and CABG:  There is no evidence of ACS with this admission.     -Continue with Plavix, Lipitor, beta-blocker          I have spent 35 minutes of face to face time with the patient with more than 50% spent counseling and coordinating care. I have personally reviewed the reports and images of labs, radiological studies, cardiac studies including ECG's and telemetry, current and old medical records. The note was completed using EMR and Dragon dictation system. Every effort was made to ensure accuracy; however, inadvertent computerized transcription errors may be present. All questions and concerns were addressed to the patient/family. Alternatives to my treatment were discussed. Thank you for allowing to us to participate in the care or Luca Jordan. Please call our service with questions.     Enoc Rondon MD, [facility] [place], [facility] [place] [address] [place] [id number]  Ph: [phone]  Fax: [phone]

## 2021-06-28 NOTE — TELEPHONE ENCOUNTER
----- Message from TRAY Barrientos CNP sent at 6/27/2021  8:20 AM EDT -----  Vit D OTC recommended if not taking it   Repeat CMP in approx  3-4 weeks /recheck her potassium

## 2021-06-28 NOTE — PROGRESS NOTES
Nephrology Progress Note          CC: We are following this patient for SHAUNNA on CKD. Admitted for sHF and AF    past medical history of CAD, COPD, CHF, DM, GERD, HTN, HLD and PVD who has had multiple admissions in the hospital because of decompensated HF. Patient has a known history of CKD stage 3 with a baseline serum creatinine of 1.3-1.6mg/dL. Also recurrent SHAUNNA episodes secondary to cardiorenal syndrome. She is known to have had a renal artery stent in the past as well. Last admission, her serum creatinine went up as high as 4.1mg/dL but improved down to 1.6mg/dL on discharge with diuresis. She presents with dyspnea. Her Cr was 2.2 on  now 1.7-1.9 ,she has cardiorenal syndrome, now has AFib as new onset      SUBJECTIVE:    She feels better  She is understandably anxious for discharge  Good response lasix infusion  Na a bit better  Persistent edema and HIGHTOWER    No  CP. No cough or wheezing. No N/V, abd pain, or diarrhea. No F/C. Physical Exam:    VITALS:  /76   Pulse 77   Temp 96.8 °F (36 °C) (Oral)   Resp 18   Ht 5' 4\" (1.626 m)   Wt 267 lb 10.2 oz (121.4 kg)   LMP 10/24/2012   SpO2 95%   BMI 45.94 kg/m²   TEMPERATURE:  Current - Temp: 96.8 °F (36 °C);  Max - Temp  Av.4 °F (36.3 °C)  Min: 96.8 °F (36 °C)  Max: 98.1 °F (36.7 °C)  RESPIRATIONS RANGE: Resp  Av.4  Min: 16  Max: 18  PULSE RANGE: Pulse  Av  Min: 64  Max: 77  BLOOD PRESSURE RANGE:  Systolic (29FFF), TUYET:002 , Min:97 , PXA:856   ; Diastolic (82SML), MVO:54, Min:63, Max:76    PULSE OXIMETRY RANGE: SpO2  Av.7 %  Min: 95 %  Max: 99 %  24HR INTAKE/OUTPUT:    Intake/Output Summary (Last 24 hours) at 2021 1153  Last data filed at 2021 1113  Gross per 24 hour   Intake 2306 ml   Output 2950 ml   Net -644 ml       Constitutional:  NAD, obese  HEENT:  No oral lesions  Lungs:  Diminished BS bibasilar, no rales  Cardiovascular:  RRR, 2/6 MONA  Abd:  Obese, large pannus, soft, NT  Ext:  3+ LE edema  Skin:  Stasis changes legs  Neuro: non-focal      DATA:    CBC:   Recent Labs     06/26/21  0653 06/27/21  0646 06/28/21  0714   WBC 10.5 8.1 7.7   RBC 4.96 4.64 4.46   HGB 14.3 13.3 12.8   HCT 44.0 40.8 39.0    310 266     BMP:    Recent Labs     06/27/21  1759 06/28/21  0003 06/28/21  0714   * 123* 127*   K 4.5 4.4 3.8   CL 83* 84* 86*   CO2 25 24 26   BUN 86* 85* 89*   CREATININE 2.4* 2.3* 2.3*   CALCIUM 9.1 8.8 9.1   GLUCOSE 66* 83 70     Phosphorus:    Recent Labs     06/27/21  0646   PHOS 4.3     Magnesium:    Recent Labs     06/27/21  0646   MG 2.60*     Hepatic Function Panel:    Lab Results   Component Value Date    ALKPHOS 166 06/21/2021    ALT 14 06/21/2021    AST 14 06/21/2021    PROT 6.5 06/27/2021    BILITOT 0.6 06/21/2021    BILIDIR 0.7 01/12/2021    IBILI 0.5 01/12/2021    LABALBU 3.5 06/27/2021    LABALBU 2.9 06/27/2021     Uric Acid:    Lab Results   Component Value Date    LABURIC 16.0 05/20/2021     PT/INR:    Lab Results   Component Value Date    PROTIME 13.5 06/22/2021    INR 1.16 06/22/2021       IMPRESSION/RECOMMENDATIONS:      1. SHAUNNA - felt to be CRS  Creatinine above previous baseline but stable  Work on diuresis  2. CKD stage 4 - due to recurrent SHAUNNA and loss nephron mass  (left renal artery angioplasty complicated by perinephric hematoma and col embolization)  previous baseline creatinine in upper 1 range but has been stable in low 2 range of late  Follows with Dr Howard Manley  3. Hyponatremia - volume excess state - by definition this cannot be SIADH  (ADH elevation not INAPPROPRIATE)  High ADH due to systolic HF  Continue oral fluid restriction and diuresis  Tolvaptan can be used in volume excess with hyponatremia but not needed at present  4.  Acute on chronic systolic HF - weights variable but had been in 220# range in past  Weight 249 to 267 this admission  Started lasix infusion and hydralazine/isosorbide  Good response  Increase lasix infusion today and hope to change to oral diuretics tomorrow  5. AF - new diagnosis  Rate controlled  6. Hyperkalemia - spironolactone stopped  K controlled now  7. COPD -   8.  DM 2 -     Pascual Sanders MD MD

## 2021-06-29 NOTE — PROGRESS NOTES
Physical Therapy  [address]  Daily Treatment Note  NAME: Rona Concepcion  : 1965  MRN: [id number]    Date of Service: 2021    Discharge Recommendations:    Rona Concepcion scored a 13/24 on the AM-PAC short mobility form. Current research shows that an AM-PAC score of 17 or less is typically not associated with a discharge to the patient's home setting. Based on the patient's AM-PAC score and their current functional mobility deficits, it is recommended that the patient have 3-5 sessions per week of Physical Therapy at d/c to increase the patient's independence. Please see assessment section for further patient specific details. PT Equipment Recommendations  Equipment Needed: No    Assessment   Assessment: Patient continues to be unable to maintain NWB on LLE but does appear to be attempting to offset pressure with use of walker. Gait distance limited due to inability to comply with weight bearing restrictions. Continue to recommend skilled PT upon discharge. Will follow while in acute care setting to improve functional mobility. Treatment Diagnosis: Decreased gait due to heart failure  Patient Education: Role of PT and WB status. Pt verbalized partial undertanding and would benefit from reinforcement. REQUIRES PT FOLLOW UP: Yes  Activity Tolerance  Activity Tolerance: Patient limited by endurance; Patient limited by fatigue     Patient Diagnosis(es): There were no encounter diagnoses.      has a past medical history of Acute blood loss anemia, Acute kidney injury ([address] Acute kidney injury superimposed on CKD ([address] Acute on chronic combined systolic and diastolic congestive heart failure ([address] Acute on chronic right-sided heart failure ([address] Alcohol dependence ([address] Arthritis, Asthma, CAD (coronary artery disease), Cellulitis, COPD (chronic obstructive pulmonary disease) ([address] Diabetic ulcer of left foot associated with type 2 diabetes mellitus, limited to breakdown of skin ([address] Diabetic ulcer of toe of right foot associated with type 2 diabetes mellitus ([address] Left renal artery stenosis ([address] MI (myocardial infarction) (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 has a past surgical history that includes Tonsillectomy; Cholecystectomy; tumor excision; Upper gastrointestinal endoscopy (4/25/2013); Upper gastrointestinal endoscopy (12/10/2015); Upper gastrointestinal endoscopy (01/06/2017); Arterial bypass surgry (Left, 01/06/2016); Cardiac catheterization (03/27/2018); Coronary angioplasty with stent (03/16/2018); Rotator cuff repair (Left, 04/22/2016); Coronary angioplasty with stent (01/03/2018); Insertable Cardiac Monitor (02/14/2019); femoral bypass (Right, 5/16/2019); transluminal angioplasty (Left, 10/08/2019); Cardiac catheterization (01/20/2020); Coronary artery bypass graft (N/A, 1/27/2020); vascular surgery (Left, 12/08/2015); transluminal angioplasty (Left, 04/29/2020); vascular surgery (Bilateral, 07/07/2020); Coronary angioplasty with stent (10/07/2019); transesophageal echocardiogram (01/26/2020); vascular surgery (Left, 08/04/2020); vascular surgery (Left, 08/05/2020); and vascular surgery (Left, 09/01/2020). Restrictions  Restrictions/Precautions  Restrictions/Precautions: Weight Bearing  Lower Extremity Weight Bearing Restrictions  Left Lower Extremity Weight Bearing: Non Weight Bearing  Position Activity Restriction  Other position/activity restrictions: NWB L leg  Subjective   General  Chart Reviewed: Yes  Additional Pertinent Hx: Pt to [facility] ED 6/21 with shortness of breath. Pt admitted to [place] [address] for heart failure. CXR (-). Podiatry consult for LE wounds. Imaging (-) B LEs for ankle fx/osteomyelitis. NWB L LE until pt gets a [place].   PMH:  CHF, OA, asthma, CAD, COPD, DM, ETOH use, MI, CABG  Response To Previous Treatment: Patient with no complaints from previous session. Family / Caregiver Present: No  Subjective  Subjective: Patient agreeable to PT treatment. \"I can't keep all the weight off. \"  General Comment  Comments: Patient sitting EOB upon arrival.  Pain Screening  Patient Currently in Pain: Yes (chronic BLE pain, not rated, RN aware)  Vital Signs  Patient Currently in Pain: Yes (chronic BLE pain, not rated, RN aware)       Orientation  Orientation  Overall Orientation Status: Within Functional Limits  Cognition   Cognition  Overall Cognitive Status: Exceptions  Following Commands:  Follows one step commands consistently  Safety Judgement: Decreased awareness of need for assistance;Decreased awareness of need for safety  Insights: Decreased awareness of deficits  Initiation: Requires cues for some  Sequencing: Requires cues for some  Objective   Bed mobility  Comment: patient sitting EOB upon arrival and up in bedside chair at end of session  Transfers  Sit to Stand: Minimal Assistance (from EOB and from bedside commode, verbal cues for UE placement, patient pulls up on walker despite cues)  Stand to sit: Minimal Assistance (to bedside commode and to bedside chair, verbal cues for safety and reach back prior to sitting)  Ambulation  Ambulation?: Yes  Ambulation 1  Surface: level tile  Device: Rolling Walker  Assistance: Minimal assistance  Quality of Gait: patient unable to maintain NWB on LLE, does appear to be attempting to unweight but only able to moderately unweight; gait fairly steady with no loss of balance noted, decreased tamir, step to pattern leading with LLE  Distance: 5' forward to sink, 5' to bedside chair  Comments: patient unable to maintain NWB on LLE with ambulation and/or static standing  Stairs/Curb  Stairs?: No     Balance  Sitting - Static: Good (patient sitting down to perform grooming tasks due to inability to maintain NWB on LLE)  Standing - Static: Fair;- (min assist with UE support)  Standing - Dynamic: Fair;- (min assist to ambulate with FWW)  Exercises  Hip Flexion: seated marching x 10 reps bilaterally, minimal ROM  Hip Abduction: hip adduction squeezes x 10 reps bilaterally  Knee Long Arc Quad: x10 reps bilaterally  Ankle Pumps: x10 reps bilaterally                      OutComes Score                                                     AM-PAC Score  AM-PAC Inpatient Mobility Raw Score : 13 (06/29/21 1527)  AM-PAC Inpatient T-Scale Score : 36.74 (06/29/21 1527)  Mobility Inpatient CMS 0-100% Score: 64.91 (06/29/21 1527)  Mobility Inpatient CMS G-Code Modifier : CL (06/29/21 1527)          Goals  Short term goals  Time Frame for Short term goals: Discharge  Short term goal 1: sit <> stand CGA with keeping NWB LLE - ongoing 6/29  Short term goal 2: stand pivot transfers CGA with keeping NWB LLE - ongoing 6/29  Patient Goals   Patient goals : Return home    Plan    Plan  Times per week: 2-5  Current Treatment Recommendations: Transfer Training, Functional Mobility Training, Strengthening  Safety Devices  Type of devices: Left in chair, Chair alarm in place, Call light within reach, Nurse notified, Gait belt     Therapy Time   Individual Concurrent Group Co-treatment   Time In 1045         Time Out 1116         Minutes 31         Timed Code Treatment Minutes: 31 Minutes     If patient discharges prior to next session this note will serve as a discharge summary. Please see below for the latest assessment towards goals.    [address]

## 2021-06-29 NOTE — FLOWSHEET NOTE
06/29/21 1030   Encounter Summary   Services provided to: Patient   Referral/Consult From: Ana Rosa   Continue Visiting   (6/29/21, SULTANA. )   Complexity of Encounter Moderate   Length of Encounter 15 minutes   Routine   Type Initial   Assessment Calm; Approachable   Intervention Nurtured hope   Outcome Expressed gratitude

## 2021-06-29 NOTE — CARE COORDINATION
CM cont to follow for  D/c planning :    [facility]               [address] [address]      REPORT Phone: [phone]      FAx Fax: [phone]          CM  spoke with Cedarville Breaker [phone] at [facility] and they confirmed that they can still take the Pt. SUSANNE Doreen Oneill) called on 6/28/2021  [address] [facility] [phone] and spoke with a representative. [facility] was able to change the facility from [facility] to [facility] [id number]. Pt's precert it valid for 3 days    HENS submitted; Document ID: [id number]       Electronically signed by Geo Olson RN on 6/29/2021 at 10:41 AM       Geo Olson RN Case Manager  [facility]  [address] [place] [address].   [facility] [address]  [phone]  Fax [phone]

## 2021-06-29 NOTE — PROGRESS NOTES
=    Hospitalist Progress Note      PCP: Leavy Kussmaul, PA-C    Date of Admission: 6/21/2021    Chief Complaint: SOB    Hospital Course: Patient is 53-year-old female with history of CAD, CABG x1, ischemic cardiomyopathy, CHF s/p CardioMEMS implant 2/2019, COPD, PAD, CKD stage III presented to [facility] ER with complaint of shortness of breath and weight gain of 30 pounds. Patient reported that she has been noncompliant with her diet having a lot of salt in food. Patient was noted to be in A. fib with RVR on telemetry. Podiatry was consulted for chronic venous ulcers. Patient had abnormal artery duplex vascular surgery was consulted recommended Follow-up with Dr. Mi Rodríguez as an outpatient    Subjective  Patient seems better today. Sitting in chair. Denies any complaints.     Medications:  Reviewed    Infusion Medications    furosemide (LASIX) 1mg/ml infusion 15 mg/hr (06/29/21 0658)    dextrose       Scheduled Medications    hydrALAZINE  10 mg Oral 3 times per day    Vitamin D  1,000 Units Oral Daily    albumin human  12.5 g Intravenous Q8H    metoprolol tartrate  50 mg Oral BID    budesonide  0.5 mg Nebulization BID    And    Arformoterol Tartrate  15 mcg Nebulization BID    ARIPiprazole  15 mg Oral Daily    benztropine  1 mg Oral Nightly    busPIRone  30 mg Oral BID    doxepin  25 mg Oral Nightly    lamoTRIgine  200 mg Oral BID    methocarbamol  500 mg Oral BID    [Held by provider] insulin glargine  30 Units Subcutaneous Nightly    insulin lispro  0-12 Units Subcutaneous TID WC    apixaban  5 mg Oral BID    atorvastatin  80 mg Oral Nightly    clopidogrel  75 mg Oral Daily    pantoprazole  40 mg Oral BID AC     PRN Meds: melatonin, oxyCODONE-acetaminophen, glucose, dextrose, glucagon (rDNA), dextrose      Intake/Output Summary (Last 24 hours) at 6/29/2021 0821  Last data filed at 6/29/2021 2554  Gross per 24 hour   Intake 1555.4 ml   Output 3725 ml   Net -2169.6 ml       Physical Exam Performed:    /77   Pulse 77   Temp 97.2 °F (36.2 °C) (Oral)   Resp 18   Ht 5' 4\" (1.626 m)   Wt 270 lb 4.5 oz (122.6 kg)   LMP 10/24/2012   SpO2 99%   BMI 46.39 kg/m²     General appearance: NAD  HEENT: Pupils equal, round, and reactive to light. Conjunctivae/corneas clear. Neck: Supple, with full range of motion. No jugular venous distention. Trachea midline. Respiratory: Bilateral diminished breathing sounds with mild crackles. No wheezes no rhonchi. Cardiovascular: Irregular rhythm with normal T9/J7, grade 2 systolic murmur. Abdomen: Soft, non-tender, non-distended with normal bowel sounds. Musculoskeletal: Bilateral lower extremity 3+ pitting edema improving  Skin: Chronic venous ulcers on both legs. Neurologic:  Neurovascularly intact without any focal sensory/motor deficits. Cranial nerves: II-XII intact, grossly non-focal.  Psychiatric: Alert and oriented, thought content appropriate, normal insight  Capillary Refill: Brisk,< 3 seconds   Peripheral Pulses: +2 palpable, equal bilaterally       Labs:   Recent Labs     06/27/21  0646 06/28/21  0714 06/29/21  0621   WBC 8.1 7.7 7.2   HGB 13.3 12.8 12.6   HCT 40.8 39.0 38.6    266 248     Recent Labs     06/27/21  0646 06/27/21  1225 06/28/21  1912 06/28/21  2348 06/29/21  0621   *   < > 125* 122* 128*   K 5.1  5.1   < > 3.9 3.7 3.0*   CL 83*   < > 84* 82* 85*   CO2 21   < > 27 25 28   BUN 86*   < > 82* 79* 84*   CREATININE 1.9*   < > 2.6* 2.5* 2.2*   CALCIUM 9.0   < > 9.2 8.9 9.2   PHOS 4.3  --   --   --   --     < > = values in this interval not displayed. No results for input(s): AST, ALT, BILIDIR, BILITOT, ALKPHOS in the last 72 hours. No results for input(s): INR in the last 72 hours.   Recent Labs     06/27/21  0646   CKTOTAL 86       Urinalysis:      Lab Results   Component Value Date    NITRU Negative 06/27/2021    WBCUA None seen 06/27/2021    BACTERIA 2+ 11/30/2020    RBCUA None seen 06/27/2021    BLOODU Negative 06/27/2021    SPECGRAV 1.010 06/27/2021    GLUCOSEU Negative 06/27/2021       Radiology:  US RENAL COMPLETE   Final Result   No hydronephrosis. XR CHEST PORTABLE   Final Result      No acute disease         XR CHEST 1 VIEW   Final Result     No focal consolidation or significant alteration from the previous    examination. No radiographic evidence for pulmonary vascular congestion. XR FOOT LEFT (MIN 3 VIEWS)   Final Result   1. Soft tissue swelling and evidence of neuropathic change in the midfoot. No convincing evidence of osteomyelitis or acute osseous abnormality. XR ANKLE LEFT (MIN 3 VIEWS)   Final Result   1. Ulceration of the distal calf with diffuse soft tissue swelling. No evidence for underlying osteomyelitis. 2. Neuropathic change in the midfoot. 3. Calcaneal enthesopathy. XR TIBIA FIBULA LEFT (2 VIEWS)   Final Result      No evidence of fracture or bony abnormality seen. XR FOOT RIGHT (MIN 3 VIEWS)   Final Result   1. Soft tissue swelling without acute osseous abnormality. 2. Calcaneal enthesopathy. 3. Polyarticular osteoarthritis. XR ANKLE RIGHT (MIN 3 VIEWS)   Final Result   1. Soft tissue swelling without significant osseous abnormality otherwise. XR TIBIA FIBULA RIGHT (2 VIEWS)   Final Result   1. Diffuse soft tissue swelling without acute osseous abnormality. Assessment/Plan:    Active Hospital Problems    Diagnosis Date Noted    Heart failure ([facility] [address] [address] [I50.9] 06/21/2021     Assessment and plan  #Acute on chronic combined systolic and diastolic heart failure due to diet noncompliance  Echo 4/2021 EF 61%, grade 3 diastolic dysfunction. Moderate pulmonary hypertension. Moderate MR  Per last cardiology office note patient with 245 pound  Strict intake and output, daily weight.   Initially diuresis was held due to hyponatremia  Continue Lasix drip, rate increased today  Continue hydralazine  Aldactone resumed per cardiology  Cardiology/nephrology following    #Afib with RVR new onset: Currently NSR  Rate controlled did receive digoxin on 6/22. Anticoagulation with Eliquis. Rate control with metoprolol    #CAD  #CABG x1. Continue aspirin, Plavix, statin, beta-blocker    #Hyponatremia suspected SIADH in the past  Did received Smasca/hypertonic saline. Unlikely SIADH per nephro, likely secondary to volume overload  Monitor sodium, improving  Continue Lasix drip  Nephrology following    #SHAUNNA on CKD stage IV  Creatinine 1.7-1.9 appears to be baseline  Continue diuresis  Nephrology following    #Diabetes mellitus type 2  HbA1c 11.9% 5/20/2021  Continue to hold Lantus and bedtime Humalog due to hypoglycemia. Continue insulin sliding scale. Continue nutritional supplements. #Chronic venous ulcers  Podiatry consulted, recommended no intervention    DVT Prophylaxis: Heparin  Diet: ADULT DIET; Regular; Low Sodium (2 gm); 1500 ml  Adult Oral Nutrition Supplement; Diabetic Oral Supplement  Adult Oral Nutrition Supplement; Wound Healing Oral Supplement  Code Status: Prior    PT/OT Eval Status:  In progress    Dispo -pending diuresis, nephrology/cardiology Keary Claude, MD

## 2021-06-29 NOTE — PROGRESS NOTES
Throat no lesions       Nose: Nares normal, no drainage or sinus tenderness   Throat: Lips, mucosa, and tongue normal   Neck: Supple, symmetrical, trachea midline, no adenopathy, thyroid: not enlarged, symmetric, no tenderness/mass/nodules, no carotid bruit. Lungs:   Normal respiratory rate, lungs mild basilar ronchi bilaterally. Chest Wall:  No tenderness or deformity   Heart:  Irregular rhythm, rate is controlled, S1, S2 normal, there is no murmur, there is no rub or gallop, cannot assess jvd, 2+ bilateral lower extremity edema   Abdomen:   Soft, non-tender, bowel sounds active all four quadrants,  no masses, no organomegaly       Extremities: Extremities normal, atraumatic, no cyanosis. Pulses: 2+ and symmetric   Skin: Skin color, texture, turgor normal, no rashes or lesions   Pysch: Normal mood and affect   Neurologic: Normal gross motor and sensory exam.  Cranial nerves intact       Labs:   Recent Labs     06/27/21  0646 06/27/21  1225 06/28/21  1912 06/28/21  2348 06/29/21  0621   *   < > 125* 122* 128*   K 5.1  5.1   < > 3.9 3.7 3.0*   BUN 86*   < > 82* 79* 84*   CREATININE 1.9*   < > 2.6* 2.5* 2.2*   CL 83*   < > 84* 82* 85*   CO2 21   < > 27 25 28   GLUCOSE 66*   < > 57* 133* 39*   CALCIUM 9.0   < > 9.2 8.9 9.2   MG 2.60*  --   --   --  2.40    < > = values in this interval not displayed. Recent Labs     06/27/21  0646 06/27/21  0646 06/28/21  0714 06/28/21  0714 06/29/21  0621   WBC 8.1  --  7.7  --  7.2   HGB 13.3  --  12.8  --  12.6   HCT 40.8  --  39.0  --  38.6     --  266  --  248   MCV 88.0   < > 87.3   < > 87.8    < > = values in this interval not displayed. Recent Labs     06/27/21  0646   TRIG 51   HDL 32*     No results for input(s): PTT, INR in the last 72 hours. Invalid input(s): PT  Recent Labs     06/27/21  0646   CKTOTAL 86     No results for input(s): BNP in the last 72 hours. No results for input(s): NTPROBNP in the last 72 hours.   Recent Labs 06/27/21  0646   TSH 5.67*       Imaging:       Assessment & Plan:     1. Acute on chronic HFrEF:  It is ischemic in etiology. Patient is volume overloaded but hemodynamically stable. It is most likely due to excess dietary salt. Patient reports compliance with medications.    -Will resume spironolactone at 25 mg daily.   -Will increase Lasix drip to 20 mg/h today  -Avoid metolazone due to severe hypokalemia.  -Frequent BMP check and correction of potassium  -Cw Lopressor 25 twice daily (in view of low normal BP and AF RVR). - Unable to start ACEI/ARB/spironolactone or Entresto due to CKD (allergy to lisino is severe hypotension, likely not a true allergy, rather a dose effect)  -Patient does not have an ICD  - Strict I's and O's every shift and standing weights if possible, low-salt diet and daily BMP with reflex to Mg, wean supplemental oxygen to off for sats greater than 94%. - Agree with hydralazine 10 q 8 for afterload reduction, will stop isordil due to low Bps.      2. PAF with RVR:  This is a new diagnosis for patient. It could be precipitating her acute heart failure.     -We will treat with beta-blockers  -Patient received digoxin 250 mcg IV x1 on 6/22, rate is now controlled. -Now on Eliquis.     3. CAD s/p stents and CABG:  There is no evidence of ACS with this admission.     -Continue with Plavix, Lipitor, beta-blocker          I have spent 35 minutes of face to face time with the patient with more than 50% spent counseling and coordinating care. I have personally reviewed the reports and images of labs, radiological studies, cardiac studies including ECG's and telemetry, current and old medical records. The note was completed using EMR and Dragon dictation system. Every effort was made to ensure accuracy; however, inadvertent computerized transcription errors may be present. All questions and concerns were addressed to the patient/family. Alternatives to my treatment were discussed. Thank you for allowing to us to participate in the christopher or Kristal Ojeda. Please call our service with questions.     Elaina Chacko MD, [facility] [place], [address]  The [address]  [address] [address]  Ph: [phone]  Fax: [phone]

## 2021-06-29 NOTE — PROCEDURES
Attempted EDC/midline without success, pt with oozing and large bruising LA and attempted right, PIV able to be placed. Message sent to Dr Jamie Mccord.

## 2021-06-29 NOTE — PROGRESS NOTES
Blood sugar at 1958 was 54. Pt given 2 cups of juice and crackers. Recheck at 2027 was 54. Administered 12.5g of dextrose IVP. Blood sugar up to 114 at 2108. Will continue to monitor.

## 2021-06-29 NOTE — PROGRESS NOTES
Nephrology Progress Note          CC: We are following this patient for SHAUNNA on CKD. Admitted for sHF and AF    past medical history of CAD, COPD, CHF, DM, GERD, HTN, HLD and PVD who has had multiple admissions in the hospital because of decompensated HF. Patient has a known history of CKD stage 3 with a baseline serum creatinine of 1.3-1.6mg/dL. Also recurrent SHAUNNA episodes secondary to cardiorenal syndrome. She is known to have had a renal artery stent in the past as well. Last admission, her serum creatinine went up as high as 4.1mg/dL but improved down to 1.6mg/dL on discharge with diuresis. She presents with dyspnea. Her Cr was 2.2 on  now 1.7-1.9 ,she has cardiorenal syndrome, now has AFib as new onset      SUBJECTIVE:    No new issues  Has problems with IV access and bleeding from lines  Persistent edema and HIGHTOWER  Excellent U/O but weight higher    No  CP. No cough or wheezing. No N/V, abd pain, or diarrhea. No F/C. Physical Exam:    VITALS:  /77   Pulse 77   Temp 97.2 °F (36.2 °C) (Oral)   Resp 18   Ht 5' 4\" (1.626 m)   Wt 270 lb 4.5 oz (122.6 kg)   LMP 10/24/2012   SpO2 99%   BMI 46.39 kg/m²   TEMPERATURE:  Current - Temp: 97.2 °F (36.2 °C);  Max - Temp  Av.2 °F (36.2 °C)  Min: 96.8 °F (36 °C)  Max: 97.5 °F (36.4 °C)  RESPIRATIONS RANGE: Resp  Av.6  Min: 16  Max: 18  PULSE RANGE: Pulse  Av.8  Min: 69  Max: 86  BLOOD PRESSURE RANGE:  Systolic (60XZA), OIU:711 , Min:100 , ETW:046   ; Diastolic (95GDR), RXF:24, Min:60, Max:86    PULSE OXIMETRY RANGE: SpO2  Av.4 %  Min: 95 %  Max: 99 %  24HR INTAKE/OUTPUT:      Intake/Output Summary (Last 24 hours) at 2021 0833  Last data filed at 2021 3249  Gross per 24 hour   Intake 1555.4 ml   Output 3725 ml   Net -2169.6 ml       Constitutional:  NAD, obese  HEENT:  No oral lesions  Lungs:  Diminished BS bibasilar, no rales  Cardiovascular:  RRR, 2/6 MONA  Abd:  Obese, large pannus, soft, NT  Ext:  3+ LE edema  Skin: Stasis changes legs  Neuro: non-focal      DATA:    CBC:   Recent Labs     06/27/21  0646 06/28/21  0714 06/29/21  0621   WBC 8.1 7.7 7.2   RBC 4.64 4.46 4.40   HGB 13.3 12.8 12.6   HCT 40.8 39.0 38.6    266 248     BMP:    Recent Labs     06/28/21  1912 06/28/21  2348 06/29/21  0621   * 122* 128*   K 3.9 3.7 3.0*   CL 84* 82* 85*   CO2 27 25 28   BUN 82* 79* 84*   CREATININE 2.6* 2.5* 2.2*   CALCIUM 9.2 8.9 9.2   GLUCOSE 57* 133* 39*     Phosphorus:    Recent Labs     06/27/21 0646   PHOS 4.3     Magnesium:    Recent Labs     06/27/21  0646 06/29/21 0621   MG 2.60* 2.40     Hepatic Function Panel:    Lab Results   Component Value Date    ALKPHOS 166 06/21/2021    ALT 14 06/21/2021    AST 14 06/21/2021    PROT 6.5 06/27/2021    BILITOT 0.6 06/21/2021    BILIDIR 0.7 01/12/2021    IBILI 0.5 01/12/2021    LABALBU 3.5 06/27/2021    LABALBU 2.9 06/27/2021     Uric Acid:    Lab Results   Component Value Date    LABURIC 16.0 05/20/2021     PT/INR:    Lab Results   Component Value Date    PROTIME 13.5 06/22/2021    INR 1.16 06/22/2021       IMPRESSION/RECOMMENDATIONS:      1. SHAUNNA - felt to be CRS  Creatinine above previous baseline but stable  Kay undetectable  Continue efforts at diuresis  2. CKD stage 4 - due to recurrent SHAUNNA and loss nephron mass  (left renal artery angioplasty complicated by perinephric hematoma and col embolization)  previous baseline creatinine in upper 1 range but has been stable in low 2 range of late  Follows with Dr Fabiola Nieves  3. Hyponatremia - volume excess state - by definition this cannot be SIADH  (ADH elevation not INAPPROPRIATE)  High ADH due to systolic HF. Kay undetectable  Continue oral fluid restriction and diuresis  Will use tolvaptan for low Na and as adjunct to diuresis  4.  Acute on chronic systolic HF - weights variable but had been in 220# range in past  Weight 249 to 267 this admission  Started lasix infusion and hydralazine/isosorbide  Isosorbide stopped due to low BP  Weight higher despite good U/O  Will increase lasix infusion to 20 mg/hr  Tolvaptan as above  5. AF - new diagnosis  Rate controlled  6. Hyperkalemia - spironolactone stopped  K now low - supplement  Will reconsider spironolactone reduced dose but not today  7. Poor IV access - place midline  8. COPD -   5.  DM 2 -     Aletha Holstein, MD MD

## 2021-06-30 NOTE — PROGRESS NOTES
Cardiology Consult Service  Daily Progress Note        Admit Date:  6/21/2021  Primary cardiologist: Dr Charanjit Royal (follows with Chandni Rosado, [facility])    Reason for Consultation/Chief Complaint: AHF    Subjective:      Marcy Mauricio is a 60 y.o. female with a past medical history of CAD s/p PCI to LAD and circumflex 2018, s/p CABG x1 vessel (LIMA to the LAD, 01/2020), HFrEF due to [facility], s/p CardioMEMS implant (02/2019), moderate MR, PAD s/p RA stent, CKD, DM, COPD, chronic hyponatremia, chronic leg wounds.     Echo 04/2021: Normal LV size, LVEF 45%, diastolic grade 3, moderate MR, RV dilatation with dysfunction, RVSP 52 (RAP 8).     R/LHC 08/2020: RA 15, PA 69/20, W 19, CO 5.4, EDP 22. LIMA to LAD patent with coronaries still due to left subclavian artery stenosis.     ECG 04/2021: NSR with PACs.     Home medications torsemide 100 mg daily, metolazone 5 mg 3 times a week, spironolactone 50 mg daily     Patient presented to the emergency room with weight gain of 30 pounds, worsening shortness of breath and lower extremity edema. Patient admits to excess salt in her diet. Potassium was 2.8. ECG revealed AF with RVR, right bundle branch block, inferior, anterior, lateral MI. She was admitted for acute heart failure, hypokalemia, PAFRVR. Patient has not been started on any IV diuretics due to persistent hypokalemia currently being treated with IV and p.o. potassium. She remains off supplemental oxygen. Creatinine down to 1.7 from 2.0. Chest x-ray unremarkable. Interval history:  Patient reports improvement with her symptoms. Sodium stable at 125, weight down 4 pounds, I's and O's -2.7 L, then -1.5 L today. Creatinine stable at 2.5. She remains off supplemental oxygen.     Objective:     Medications:   lidocaine 1 % injection  5 mL Intradermal Once    sodium chloride flush  5-40 mL Intravenous 2 times per day    tolvaptan  15 mg Oral Daily    spironolactone  25 mg Oral Daily    hydrALAZINE  10 mg Oral 3 times per day    Vitamin D  1,000 Units Oral Daily    metoprolol tartrate  50 mg Oral BID    budesonide  0.5 mg Nebulization BID    And    Arformoterol Tartrate  15 mcg Nebulization BID    ARIPiprazole  15 mg Oral Daily    benztropine  1 mg Oral Nightly    busPIRone  30 mg Oral BID    doxepin  25 mg Oral Nightly    lamoTRIgine  200 mg Oral BID    methocarbamol  500 mg Oral BID    [Held by provider] insulin glargine  30 Units Subcutaneous Nightly    insulin lispro  0-12 Units Subcutaneous TID WC    apixaban  5 mg Oral BID    atorvastatin  80 mg Oral Nightly    clopidogrel  75 mg Oral Daily    pantoprazole  40 mg Oral BID AC       IV drips:   sodium chloride      furosemide (LASIX) 1mg/ml infusion 20 mg/hr (06/30/21 1231)    dextrose         PRN:  sodium chloride flush, sodium chloride, melatonin, oxyCODONE-acetaminophen, glucose, dextrose, glucagon (rDNA), dextrose    Vitals:    06/30/21 0633 06/30/21 0829 06/30/21 0848 06/30/21 1215   BP: 112/78 115/74  104/69   Pulse: 107 96  70   Resp: 20 20 18 18   Temp: 97.4 °F (36.3 °C) 94.5 °F (34.7 °C)  97.2 °F (36.2 °C)   TempSrc: Oral Axillary  Axillary   SpO2: 96% 97% 99% 97%   Weight: 266 lb 12.1 oz (121 kg)      Height:           Intake/Output Summary (Last 24 hours) at 6/30/2021 1640  Last data filed at 6/30/2021 1453  Gross per 24 hour   Intake 640 ml   Output 2800 ml   Net -2160 ml     I/O last 3 completed shifts:   In: 0 [P.O.:640]  Out: 3800 [Urine:3800]  Wt Readings from Last 3 Encounters:   06/30/21 266 lb 12.1 oz (121 kg)   06/21/21 245 lb (111.1 kg)   06/17/21 245 lb (111.1 kg)       Admit Wt: Weight: 249 lb (112.9 kg)   Todays Wt: Weight: 266 lb 12.1 oz (121 kg)    TELEMETRY: Atrial fibrillation with CVR 70s    Physical Exam:         General Appearance:  Alert, cooperative, no distress, appears stated age Appropriate weight   Head:  Normocephalic, without obvious abnormality, atraumatic   Eyes:  PERRL, conjunctiva/corneas clear EOM intact  Ears normal   Throat no lesions       Nose: Nares normal, no drainage or sinus tenderness   Throat: Lips, mucosa, and tongue normal   Neck: Supple, symmetrical, trachea midline, no adenopathy, thyroid: not enlarged, symmetric, no tenderness/mass/nodules, no carotid bruit. Lungs:   Normal respiratory rate, lungs mild basilar ronchi bilaterally. Chest Wall:  No tenderness or deformity   Heart:  Irregular rhythm, rate is controlled, S1, S2 normal, there is no murmur, there is no rub or gallop, cannot assess jvd, 2+ bilateral lower extremity edema   Abdomen:   Soft, non-tender, bowel sounds active all four quadrants,  no masses, no organomegaly       Extremities: Extremities normal, atraumatic, no cyanosis. Pulses: 2+ and symmetric   Skin: Skin color, texture, turgor normal, no rashes or lesions   Pysch: Normal mood and affect   Neurologic: Normal gross motor and sensory exam.  Cranial nerves intact       Labs:   Recent Labs     06/29/21  0621 06/29/21  2019 06/30/21  0631   * 125* 125*   K 3.0* 4.5 3.9   BUN 84* 75* 75*   CREATININE 2.2* 2.6* 2.5*   CL 85* 84* 84*   CO2 28 25 25   GLUCOSE 39* 173* 118*   CALCIUM 9.2 9.0 9.1   MG 2.40  --  2.40     Recent Labs     06/28/21  0714 06/28/21  0714 06/29/21  0621 06/29/21  0621 06/30/21  0632   WBC 7.7  --  7.2  --  8.7   HGB 12.8  --  12.6  --  12.1   HCT 39.0  --  38.6  --  36.8     --  248  --  259   MCV 87.3   < > 87.8   < > 88.3    < > = values in this interval not displayed. No results for input(s): CHOLTOT, TRIG, HDL in the last 72 hours. Invalid input(s): LIPIDCOMM, CHOLHDL, VLDCHOL, LDL  No results for input(s): PTT, INR in the last 72 hours. Invalid input(s): PT  No results for input(s): CKTOTAL, CKMB, CKMBINDEX, TROPONINI in the last 72 hours. No results for input(s): BNP in the last 72 hours. No results for input(s): NTPROBNP in the last 72 hours. No results for input(s): TSH in the last 72 hours.     Imaging: Assessment & Plan:     1. Acute on chronic HFrEF:  It is ischemic in etiology. Patient is volume overloaded but hemodynamically stable. It is most likely due to excess dietary salt. Patient reports compliance with medications.    -Cw spironolactone at 25 mg daily.   -Cw Lasix drip 20 mg/h today  -Avoid metolazone due to severe hypokalemia.  -Frequent BMP check and correction of potassium  -Cw Lopressor 50 twice daily (in view of low normal BP and AF RVR). - Unable to start ACEI/ARB/spironolactone or Entresto due to CKD (allergy to lisino is severe hypotension, likely not a true allergy, rather a dose effect)  -Patient does not have an ICD  - Strict I's and O's every shift and standing weights if possible, low-salt diet and daily BMP with reflex to Mg, wean supplemental oxygen to off for sats greater than 94%. - Agree with hydralazine 10 q 8 for afterload reduction.     2. PAF with RVR:  This is a new diagnosis for patient. It could be precipitating her acute heart failure.     -We will treat with beta-blockers  -Patient received digoxin 250 mcg IV x1 on 6/22, rate is now controlled. -Now on Eliquis.     3. CAD s/p stents and CABG:  There is no evidence of ACS with this admission.     -Continue with Plavix, Lipitor, beta-blocker          I have spent 35 minutes of face to face time with the patient with more than 50% spent counseling and coordinating care. I have personally reviewed the reports and images of labs, radiological studies, cardiac studies including ECG's and telemetry, current and old medical records. The note was completed using EMR and Dragon dictation system. Every effort was made to ensure accuracy; however, inadvertent computerized transcription errors may be present. All questions and concerns were addressed to the patient/family. Alternatives to my treatment were discussed. Thank you for allowing to us to participate in the care or Amadou Rainey.  Please call our service with questions.     Debi Marte MD, [facility] [place], [address]  The [address]  [address] [place] [address] [address] [address]  Ph: [phone]  Fax: [phone]

## 2021-06-30 NOTE — PROGRESS NOTES
Reassessed blood glucose and pt increased to 75. Gave pt another two orange juices. Will cont to monitor.  No further issues at this time

## 2021-06-30 NOTE — PROGRESS NOTES
Nephrology Progress Note          CC: We are following this patient for SHAUNNA on CKD. Admitted for sHF and AF    past medical history of CAD, COPD, CHF, DM, GERD, HTN, HLD and PVD who has had multiple admissions in the hospital because of decompensated HF. Patient has a known history of CKD stage 3 with a baseline serum creatinine of 1.3-1.6mg/dL. Also recurrent SHAUNNA episodes secondary to cardiorenal syndrome. She is known to have had a renal artery stent in the past as well. Last admission, her serum creatinine went up as high as 4.1mg/dL but improved down to 1.6mg/dL on discharge with diuresis. She presents with dyspnea. Her Cr was 2.2 on  now 1.7-1.9 ,she has cardiorenal syndrome, now has AFib as new onset      SUBJECTIVE:    Could not place midline - veins too poor  Good response to higher dose lasix infusion  On spironolactone as well  Weight lower  Na a bit higher    No  CP. No cough or wheezing. No N/V, abd pain, or diarrhea. No F/C. Physical Exam:    VITALS:  /74   Pulse 96   Temp 94.5 °F (34.7 °C) (Axillary)   Resp 18   Ht 5' 4\" (1.626 m)   Wt 266 lb 12.1 oz (121 kg)   LMP 10/24/2012   SpO2 99%   BMI 45.79 kg/m²   TEMPERATURE:  Current - Temp: 94.5 °F (34.7 °C);  Max - Temp  Av.5 °F (35.3 °C)  Min: 94.1 °F (34.5 °C)  Max: 97.4 °F (36.3 °C)  RESPIRATIONS RANGE: Resp  Av.8  Min: 16  Max: 20  PULSE RANGE: Pulse  Av.3  Min: 73  Max: 107  BLOOD PRESSURE RANGE:  Systolic (06JAK), MNT:653 , Min:92 , QCJ:158   ; Diastolic (80YSY), BMW:36, Min:64, Max:78    PULSE OXIMETRY RANGE: SpO2  Av.3 %  Min: 96 %  Max: 99 %  24HR INTAKE/OUTPUT:      Intake/Output Summary (Last 24 hours) at 2021 1040  Last data filed at 2021 0920  Gross per 24 hour   Intake 767 ml   Output 4200 ml   Net -3433 ml       Constitutional:  NAD, obese  HEENT:  No oral lesions  Lungs:  Diminished BS bibasilar, no rales  Cardiovascular:  RRR, 2/6 MONA  Abd:  Obese, large pannus, soft, NT  Ext:  3+ LE edema  Skin:  Stasis changes legs  Neuro: non-focal      DATA:    CBC:   Recent Labs     06/28/21  0714 06/29/21  0621 06/30/21  0632   WBC 7.7 7.2 8.7   RBC 4.46 4.40 4.16   HGB 12.8 12.6 12.1   HCT 39.0 38.6 36.8    248 259     BMP:    Recent Labs     06/29/21  0621 06/29/21  2019 06/30/21  0631   * 125* 125*   K 3.0* 4.5 3.9   CL 85* 84* 84*   CO2 28 25 25   BUN 84* 75* 75*   CREATININE 2.2* 2.6* 2.5*   CALCIUM 9.2 9.0 9.1   GLUCOSE 39* 173* 118*     Phosphorus:    Recent Labs     06/30/21  0631   PHOS 4.1     Magnesium:    Recent Labs     06/29/21  0621 06/30/21  0631   MG 2.40 2.40     Hepatic Function Panel:    Lab Results   Component Value Date    ALKPHOS 166 06/21/2021    ALT 14 06/21/2021    AST 14 06/21/2021    PROT 6.5 06/27/2021    BILITOT 0.6 06/21/2021    BILIDIR 0.7 01/12/2021    IBILI 0.5 01/12/2021    LABALBU 3.5 06/27/2021    LABALBU 2.9 06/27/2021     Uric Acid:    Lab Results   Component Value Date    LABURIC 16.0 05/20/2021     PT/INR:    Lab Results   Component Value Date    PROTIME 13.5 06/22/2021    INR 1.16 06/22/2021       IMPRESSION/RECOMMENDATIONS:      1. SHAUNNA - felt to be CRS  Creatinine above previous baseline but stable in mid-2 range  Kay undetectable  Continue efforts at diuresis  2. CKD stage 4 - due to recurrent SHAUNNA and loss nephron mass  (left renal artery angioplasty complicated by perinephric hematoma and col embolization)  previous baseline creatinine in upper 1 range but has been stable in low 2 range of late  Follows with Dr Lynnette Suero  I suspect she will need dialysis before the year is out  3. Hyponatremia - volume excess state - by definition this cannot be SIADH  (ADH elevation not INAPPROPRIATE)  High ADH due to systolic HF. Kay undetectable  Continue oral fluid restriction and diuresis  On  tolvaptan for low Na and as adjunct to diuresis  If Na remains 125 or higher, reasonable  4.  Acute on chronic systolic HF - weights variable but had been in 220# range in past  Weight 249 to 267 this admission  Started lasix infusion and hydralazine/isosorbide  Isosorbide stopped due to low BP  Weight lower today and good U/O  Continue lasix infusion today  Expect to change to oral diuretic tomorrow - would plan oral Bumex 2 mg BID  Patient says she needs to leave tomorrow  5. AF - new diagnosis  Rate controlled  6. Hyperkalemia - spironolactone restarted  7. COPD -   8. DM 2 -   9.  DISP: patient tells me she needs to leave tomorrow due to meeting with   Would plan on Bumex 2 mg BID as OP  Would continue spironolactone daily as well - adjust depending on labs    Td Agrawal MD MD

## 2021-06-30 NOTE — PROGRESS NOTES
=    Hospitalist Progress Note      PCP: Jody Marshall PA-C    Date of Admission: 6/21/2021    Chief Complaint: SOB    Hospital Course: Patient is 47-year-old female with history of CAD, CABG x1, ischemic cardiomyopathy, CHF s/p CardioMEMS implant 2/2019, COPD, PAD, CKD stage III presented to [facility] ER with complaint of shortness of breath and weight gain of 30 pounds. Patient reported that she has been noncompliant with her diet having a lot of salt in food. Patient was noted to be in A. fib with RVR on telemetry. Podiatry was consulted for chronic venous ulcers. Patient had abnormal artery duplex vascular surgery was consulted recommended Follow-up with Dr. Sissy Cruz as an outpatient    Subjective  Patient appears well. Complaining of knee and foot pain. Denies any complaints. Wanting to leave as soon as possible. Discussed discharge planning with the patient.     Medications:  Reviewed    Infusion Medications    sodium chloride      furosemide (LASIX) 1mg/ml infusion 20 mg/hr (06/30/21 0703)    dextrose       Scheduled Medications    lidocaine 1 % injection  5 mL Intradermal Once    sodium chloride flush  5-40 mL Intravenous 2 times per day    tolvaptan  15 mg Oral Daily    spironolactone  25 mg Oral Daily    hydrALAZINE  10 mg Oral 3 times per day    Vitamin D  1,000 Units Oral Daily    metoprolol tartrate  50 mg Oral BID    budesonide  0.5 mg Nebulization BID    And    Arformoterol Tartrate  15 mcg Nebulization BID    ARIPiprazole  15 mg Oral Daily    benztropine  1 mg Oral Nightly    busPIRone  30 mg Oral BID    doxepin  25 mg Oral Nightly    lamoTRIgine  200 mg Oral BID    methocarbamol  500 mg Oral BID    [Held by provider] insulin glargine  30 Units Subcutaneous Nightly    insulin lispro  0-12 Units Subcutaneous TID WC    apixaban  5 mg Oral BID    atorvastatin  80 mg Oral Nightly    clopidogrel  75 mg Oral Daily    pantoprazole  40 mg Oral BID AC     PRN Meds: sodium chloride flush, sodium chloride, melatonin, oxyCODONE-acetaminophen, glucose, dextrose, glucagon (rDNA), dextrose      Intake/Output Summary (Last 24 hours) at 6/30/2021 0842  Last data filed at 6/30/2021 0031  Gross per 24 hour   Intake 817 ml   Output 3000 ml   Net -2183 ml       Physical Exam Performed:    /74   Pulse 96   Temp 94.5 °F (34.7 °C) (Axillary)   Resp 20   Ht 5' 4\" (1.626 m)   Wt 266 lb 12.1 oz (121 kg)   LMP 10/24/2012   SpO2 97%   BMI 45.79 kg/m²     General appearance: NAD  HEENT: Pupils equal, round, and reactive to light. Conjunctivae/corneas clear. Neck: Supple, with full range of motion. No jugular venous distention. Trachea midline. Respiratory: Bilateral diminished breathing sounds with mild crackles, improving. No wheezes no rhonchi. Cardiovascular: Irregular rhythm with normal L3/I6, grade 2 systolic murmur. Abdomen: Soft, non-tender, non-distended with normal bowel sounds. Musculoskeletal: Bilateral lower extremity 3+ pitting edema improving  Skin: Chronic venous ulcers on both legs. Neurologic:  Neurovascularly intact without any focal sensory/motor deficits. Cranial nerves: II-XII intact, grossly non-focal.  Psychiatric: Alert and oriented, thought content appropriate, normal insight  Capillary Refill: Brisk,< 3 seconds   Peripheral Pulses: +2 palpable, equal bilaterally       Labs:   Recent Labs     06/28/21  0714 06/29/21  0621 06/30/21  0632   WBC 7.7 7.2 8.7   HGB 12.8 12.6 12.1   HCT 39.0 38.6 36.8    248 259     Recent Labs     06/29/21  0621 06/29/21  2019 06/30/21  0631   * 125* 125*   K 3.0* 4.5 3.9   CL 85* 84* 84*   CO2 28 25 25   BUN 84* 75* 75*   CREATININE 2.2* 2.6* 2.5*   CALCIUM 9.2 9.0 9.1   PHOS  --   --  4.1     No results for input(s): AST, ALT, BILIDIR, BILITOT, ALKPHOS in the last 72 hours. No results for input(s): INR in the last 72 hours. No results for input(s): Danna Mounika in the last 72 hours.     Urinalysis:      Lab Results   Component Value Date    NITRU Negative 06/27/2021    WBCUA None seen 06/27/2021    BACTERIA 2+ 11/30/2020    RBCUA None seen 06/27/2021    BLOODU Negative 06/27/2021    SPECGRAV 1.010 06/27/2021    GLUCOSEU Negative 06/27/2021       Radiology:  US RENAL COMPLETE   Final Result   No hydronephrosis. XR CHEST PORTABLE   Final Result      No acute disease         XR CHEST 1 VIEW   Final Result     No focal consolidation or significant alteration from the previous    examination. No radiographic evidence for pulmonary vascular congestion. XR FOOT LEFT (MIN 3 VIEWS)   Final Result   1. Soft tissue swelling and evidence of neuropathic change in the midfoot. No convincing evidence of osteomyelitis or acute osseous abnormality. XR ANKLE LEFT (MIN 3 VIEWS)   Final Result   1. Ulceration of the distal calf with diffuse soft tissue swelling. No evidence for underlying osteomyelitis. 2. Neuropathic change in the midfoot. 3. Calcaneal enthesopathy. XR TIBIA FIBULA LEFT (2 VIEWS)   Final Result      No evidence of fracture or bony abnormality seen. XR FOOT RIGHT (MIN 3 VIEWS)   Final Result   1. Soft tissue swelling without acute osseous abnormality. 2. Calcaneal enthesopathy. 3. Polyarticular osteoarthritis. XR ANKLE RIGHT (MIN 3 VIEWS)   Final Result   1. Soft tissue swelling without significant osseous abnormality otherwise. XR TIBIA FIBULA RIGHT (2 VIEWS)   Final Result   1. Diffuse soft tissue swelling without acute osseous abnormality. Assessment/Plan:    Active Hospital Problems    Diagnosis Date Noted    Heart failure ([address] [I50.9] 06/21/2021     Assessment and plan  #Acute on chronic combined systolic and diastolic heart failure due to diet noncompliance  Echo 4/2021 EF 99%, grade 3 diastolic dysfunction. Moderate pulmonary hypertension.   Moderate MR  Per last cardiology office note patient with 245 pound  Strict intake and output, daily weight. Initially diuresis was held due to hyponatremia  Continue Lasix drip. Plan to possibly transition to p.o. tomorrow  Continue hydralazine and Aldactone  Monitor intake output and weight  Cardiology/nephrology following    #Afib with RVR new onset: Currently NSR  Rate controlled did receive digoxin on 6/22. Anticoagulation with Eliquis. Rate control with metoprolol    #CAD  #CABG x1. Continue aspirin, Plavix, statin, beta-blocker    #Hyponatremia suspected SIADH in the past  Did received Smasca/hypertonic saline. Unlikely SIADH per nephro, likely secondary to volume overload  Monitor sodium, improving  Continue Lasix drip and tolvaptan  Nephrology following    #SHAUNNA on CKD stage IV  Creatinine 1.7-1.9 appears to be baseline  Would likely need dialysis before the end of the year per nephrology  Continue diuresis  Nephrology following    #Diabetes mellitus type 2  HbA1c 11.9% 5/20/2021  Continue to hold Lantus and bedtime Humalog due to hypoglycemia. Continue insulin sliding scale. Continue nutritional supplements. #Chronic venous ulcers  Podiatry consulted, recommended no intervention    DVT Prophylaxis: Heparin  Diet: ADULT DIET; Regular; Low Sodium (2 gm); 1500 ml  Adult Oral Nutrition Supplement; Diabetic Oral Supplement  Adult Oral Nutrition Supplement; Wound Healing Oral Supplement  Code Status: Prior    PT/OT Eval Status:  In progress    Dispo -pending diuresis, nephrology/cardiology Otilio Crigler, MD

## 2021-06-30 NOTE — PROGRESS NOTES
Reassessed pt blood glucose due to the lower readings earlier today and the readings dropping to the point of interventions. Pt blood glucose went from 190 to 122. No insulin adm prior to the 122 and will cont to hold due to decreasing readings.

## 2021-06-30 NOTE — FLOWSHEET NOTE
06/30/21 1332   Encounter Summary   Services provided to: Patient   Referral/Consult From: Ana Rosa   Continue Visiting   (6/30/21, SULTANA.)   Complexity of Encounter Moderate   Length of Encounter 15 minutes   Routine   Type Follow up   Assessment Calm; Approachable   Intervention Nurtured hope   Outcome Expressed gratitude

## 2021-06-30 NOTE — PROGRESS NOTES
Physical Therapy  Facility/Department: [address]  Daily Treatment Note  NAME: Sybil Lauren  : 1965  MRN: [id number]    Date of Service: 2021    Discharge Recommendations:Poppy Bautista scored a 13/24 on the AM-PAC short mobility form. Current research shows that an AM-PAC score of 17 or less is typically not associated with a discharge to the patient's home setting. Based on the patient's AM-PAC score and their current functional mobility deficits, it is recommended that the patient have 3-5 sessions per week of Physical Therapy at d/c to increase the patient's independence. Please see assessment section for further patient specific details. If patient discharges prior to next session this note will serve as a discharge summary. Please see below for the latest assessment towards goals. PT Equipment Recommendations  Equipment Needed: No    Assessment   Assessment: Pt sleepy this am and she continues to be unable to maintain NWB L LE with gait/txfers despite max effort. Recommend nursing utilize maximover to assist pt bed to/from chair at this time to maintain NWB L LE. Pt pursuing SNF at discharge. Pt would benefit from continued skilled IP PT at discharge. Treatment Diagnosis: Decreased gait due to heart failure  Patient Education: Role of PT and WB status. Pt verbalized partial undertanding and would benefit from reinforcement. REQUIRES PT FOLLOW UP: Yes  Activity Tolerance  Activity Tolerance: Patient limited by endurance; Patient limited by fatigue;Patient limited by pain     Patient Diagnosis(es): There were no encounter diagnoses.      has a past medical history of Acute blood loss anemia, Acute kidney injury ([address] Acute kidney injury superimposed on CKD ([address] Acute on chronic combined systolic and diastolic congestive heart failure ([address] Acute on chronic right-sided heart failure ([address] Alcohol dependence ([address] Arthritis, Asthma, CAD (coronary artery disease), wounds. Imaging (-) B LEs for ankle fx/osteomyelitis. NWB L LE until pt gets a [place]. PMH:  CHF, OA, asthma, CAD, COPD, DM, ETOH use, MI, CABG  Family / Caregiver Present: No  Subjective  Subjective: Pt found up in chair and agreeable to PT. \" I just can't keep my weight off the foot. \" Pt reports she had a \"bad\" R knee and R shoulder. Pain Screening  Patient Currently in Pain: Yes (c/o chronic B LE pain; did not rate; RN aware)         Orientation  Orientation  Overall Orientation Status: Within Functional Limits        Objective   Bed mobility  Sit to Supine: Minimal assistance (for LEs; slow and effortful)  Scooting: Stand by assistance (able to scoot up in bed with use of B rails, trendelenberg position)     Transfers  Sit to Stand:  Moderate Assistance (with max vc for hand placement; pt unable to observe NWB L LE)  Stand to sit:  (min assist with max vc for hand placement; pt unable to observe NWB L LE)     Ambulation 1  Device: Rolling Walker  Assistance: Minimal assistance x1, mod assist x1  Quality of Gait: slow  tamir with decreased step length; pt unable to maintain NWB L LE (attempts to heel WB only); effortful  Distance: 3 steps to bed  Comments: patient unable to maintain NWB on LLE with ambulation and/or static standing     Balance  Sitting - Static: Good  Sitting - Dynamic: Good  Standing - Static:  (unable to stand NWB L LE)               -PAC Score  AM-PAC Inpatient Mobility Raw Score : 13 (06/30/21 1011)  AM-PAC Inpatient T-Scale Score : 36.74 (06/30/21 1011)  Mobility Inpatient CMS 0-100% Score: 64.91 (06/30/21 1011)  Mobility Inpatient CMS G-Code Modifier : CL (06/30/21 1011)          Goals  Short term goals  Time Frame for Short term goals: Discharge  Short term goal 1: sit <> stand CGA with keeping NWB LLE - ongoing  Short term goal 2: stand pivot transfers CGA with keeping NWB LLE - ongoing  Patient Goals   Patient goals : Return home    Plan    Plan  Times per week: 2-5  Current Treatment Recommendations: Transfer Training, Functional Mobility Training, Strengthening  Safety Devices  Type of devices: Nurse notified, Bed alarm in place, Call light within reach, Left in bed     Therapy Time   Individual Concurrent Group Co-treatment   Time In 0930         Time Out 0953         Minutes 23           Timed Code Treatment Minutes:  23    Total Treatment Minutes:  [address], PT

## 2021-06-30 NOTE — PROGRESS NOTES
Pt called nurse to assess blood glucose. Stated felt \"sweaty\" this nurse assessed and pt blood glucose reading was 51 this nurse gave pt two packets of peanut butter crackers and three orange juices. Pt Is alert and oriented x4 will cont to monitor.

## 2021-07-01 NOTE — PROGRESS NOTES
Nephrology Progress Note          CC: We are following this patient for SHAUNNA on CKD. Admitted for sHF and AF    past medical history of CAD, COPD, CHF, DM, GERD, HTN, HLD and PVD who has had multiple admissions in the hospital because of decompensated HF. Patient has a known history of CKD stage 3 with a baseline serum creatinine of 1.3-1.6mg/dL. Also recurrent SHAUNNA episodes secondary to cardiorenal syndrome. She is known to have had a renal artery stent in the past as well. Last admission, her serum creatinine went up as high as 4.1mg/dL but improved down to 1.6mg/dL on discharge with diuresis. She presents with dyspnea. Her Cr was 2.2 on  now 1.7-1.9 ,she has cardiorenal syndrome, now has AFib as new onset      SUBJECTIVE:    Could not place midline - veins too poor  Good response to higher dose lasix infusion  On spironolactone as well    Making progress  Anxious for discharge    No  CP. No cough or wheezing. No N/V, abd pain, or diarrhea. No F/C. Physical Exam:    VITALS:  /77   Pulse 70   Temp 94.7 °F (34.8 °C) (Axillary)   Resp 16   Ht 5' 4\" (1.626 m)   Wt 266 lb 12.1 oz (121 kg)   LMP 10/24/2012   SpO2 95%   BMI 45.79 kg/m²   TEMPERATURE:  Current - Temp: 94.7 °F (34.8 °C);  Max - Temp  Av.1 °F (35.6 °C)  Min: 94.6 °F (34.8 °C)  Max: 97.7 °F (36.5 °C)  RESPIRATIONS RANGE: Resp  Av.6  Min: 16  Max: 18  PULSE RANGE: Pulse  Av.6  Min: 70  Max: 89  BLOOD PRESSURE RANGE:  Systolic (24HOS), HMS:820 , Min:102 , PQT:424   ; Diastolic (54NLG), AOF:09, Min:69, Max:77    PULSE OXIMETRY RANGE: SpO2  Av %  Min: 95 %  Max: 97 %  24HR INTAKE/OUTPUT:      Intake/Output Summary (Last 24 hours) at 2021 0915  Last data filed at 2021 0300  Gross per 24 hour   Intake 840 ml   Output 1400 ml   Net -560 ml       Constitutional:  NAD, obese  HEENT:  No oral lesions  Lungs:  Diminished BS bibasilar, no rales  Cardiovascular:  RRR, 2/6 MONA  Abd:  Obese, large pannus, soft, NT  Ext: 3+ LE edema  Skin:  Stasis changes legs  Neuro: non-focal      DATA:    CBC:   Recent Labs     06/29/21  0621 06/30/21  0632 07/01/21  0757   WBC 7.2 8.7 7.8   RBC 4.40 4.16 4.24   HGB 12.6 12.1 12.3   HCT 38.6 36.8 36.6    259 288     BMP:    Recent Labs     06/30/21 0631 06/30/21 2059 07/01/21  0757   * 126* 127*   K 3.9 3.9 3.4*   CL 84* 84* 84*   CO2 25 25 28   BUN 75* 74* 77*   CREATININE 2.5* 2.7* 2.4*   CALCIUM 9.1 9.5 9.5   GLUCOSE 118* 71 75     Phosphorus:    Recent Labs     06/30/21  0631   PHOS 4.1     Magnesium:    Recent Labs     06/29/21 0621 06/30/21  0631   MG 2.40 2.40     Hepatic Function Panel:    Lab Results   Component Value Date    ALKPHOS 166 06/21/2021    ALT 14 06/21/2021    AST 14 06/21/2021    PROT 6.5 06/27/2021    BILITOT 0.6 06/21/2021    BILIDIR 0.7 01/12/2021    IBILI 0.5 01/12/2021    LABALBU 3.5 06/27/2021    LABALBU 2.9 06/27/2021     Uric Acid:    Lab Results   Component Value Date    LABURIC 16.0 05/20/2021     PT/INR:    Lab Results   Component Value Date    PROTIME 13.5 06/22/2021    INR 1.16 06/22/2021       IMPRESSION/RECOMMENDATIONS:      1. SHAUNNA - felt to be CRS  Creatinine above previous baseline but stable in mid-2 range  Kay undetectable  Will need to accept higher creatinine to control fluids  2. CKD stage 4 - due to recurrent SHAUNNA and loss nephron mass  (left renal artery angioplasty complicated by perinephric hematoma and col embolization)  previous baseline creatinine in upper 1 range but has been stable in low 2 range of late  Follows with Dr Gilberto Boyd  I suspect she will need dialysis before the year is out  3. Hyponatremia - volume excess state - by definition this cannot be SIADH  (ADH elevation not INAPPROPRIATE)  High ADH due to systolic HF.  Kay undetectable  Continue oral fluid restriction and diuresis  On  tolvaptan for low Na and as adjunct to diuresis  If Na remains 125 or higher, reasonable  If able to arrange, continued tolvaptan 15 mg MWF for 2 weeks would be helpful  4. Acute on chronic systolic HF - weights variable but had been in 220# range in past  Weight 249 to 267 this admission  Started lasix infusion and hydralazine/isosorbide  Isosorbide stopped due to low BP  Weight lower today and good U/O  Continue lasix infusion today  Expect to change to oral diuretic tomorrow - would plan oral Bumex 2 mg BID  Patient says she needs to leave tomorrow  5. AF - new diagnosis  Rate controlled  6. Potassium - previous high K but low of late  spironolactone restarted, increase dosing  Supplement K today  7. COPD -   8. DM 2 -   9.  DISP: patient tells me she needs to leave due to meeting with   Would plan on Bumex 2 mg BID as OP  Would continue spironolactone BID as well   If able to give, would use tolvaptan 15 mg MWF for 2 weeks  Needs twice weekly labs initially as OP for 2 weeks then weekly  Will communicate with Dr Cleopatra Cervantes MD

## 2021-07-01 NOTE — CARE COORDINATION
Case Management Assessment            Discharge Note                    Date / Time of Note: 7/1/2021 1:24 PM                  Discharge Note Completed by: Corinne France, MSW, KARSON    Patient Name: Michael Lopez   YOB: 1965  Diagnosis: Heart failure ([address] [I50.9]   Date / Time: 6/21/2021  8:41 PM    Current PCP: Marcella Reis PA-C  Clinic patient: No    Hospitalization in the last 30 days: No    Advance Directives:  Code Status: Prior  [place] DNR form completed and on chart: Yes    Financial:  Payor: Francine Munoz / Plan: [place] / Product Type: *No Product type* /      Pharmacy:    [address] [phone]  [address] [id number]  Phone: [phone] Fax: [phone]    [address]  [address]  Phone: [phone] Fax: [phone]      Assistance purchasing medications?: Potential Assistance Purchasing Medications: Yes  Assistance provided by Case Management: None at this time    Does patient want to participate in local refill/ meds to beds program?: Yes    Meds To Beds General Rules:  1. Can ONLY be done Monday- Friday between 8:30am-5pm  2. Prescription(s) must be in pharmacy by 3pm to be filled same day  3. Copy of patient's insurance/ prescription drug card and patient face sheet must be sent along with the prescription(s)  4. Cost of Rx cannot be added to hospital bill. If financial assistance is needed, please contact unit  or ;  or  CANNOT provide pharmacy voucher for patients co-pays  5.  Patients can then  the prescription on their way out of the hospital at discharge, or pharmacy can deliver to the bedside if staff is available. (payment due at time of pick-up or delivery - cash, check, or card accepted) Able to afford home medications/ co-pay costs: Yes    ADLS:  Current PT AM-PAC Score: 10 /24  Current OT AM-PAC Score: 13 /24      DISCHARGE Disposition: Home with Home Health Care: [facility]    LOC at discharge: Not Applicable  CARMEN Completed: Yes    Notification completed in HENS/PAS?:  Not Applicable    IMM Completed:   Not Indicated    Transportation:  Transportation PLAN for discharge: EMS transportation   Mode of Transport: Ambulance stretcher - BLS  Reason for medical transport: CHF, NWB on Lower Left Extremity due to Charcot neuroarthropathy  Name of [address] [place] [address], [address]: [facility]  Phone: [phone]  Time of Transport: 5:30    Transport form completed: Yes    Home Care:  [address] ordered at discharge: Mauricio Weaver: [facility] [phone]  Orders faxed: Yes    Durable Medical Equipment:  DME Provider: Anton Smith obtained during hospitalization: wheelchair    Home Oxygen and Respiratory Equipment:  Oxygen needed at discharge?: No  [address]: Not Applicable  Portable tank available for discharge?: No    Dialysis:  Dialysis patient: No    Dialysis Center:  Not Applicable    Additional CM Notes: Pt from home w/family, Pt to return home at DC versus SNF placement. SUSANNE spoke with Pt F2F about benefits of SNF placement vs Home with HHC, Pt identified that they understood this and that they still want to return home w/HHC. SUSANNE spoke with Ashley at [facility] [phone] and confirmed start of services. SUSANNE also spoke with Jenny Devine [phone] with [facility] to get Pt a wheelchair at DC. [facility] will transport the patient home.      The Plan for Transition of Care is related to the following treatment goals of Heart failure ([address] [I50.9]    The Patient and/or patient representative Devora Berry and her family were provided with a choice of provider and agrees with the discharge plan Yes    Freedom of choice list was provided with basic dialogue that

## 2021-07-01 NOTE — PROGRESS NOTES
Physical Therapy    Daily Treatment Note      Discharge Recommendations:  Calton Litten scored a 10/24 on the AM-PAC short mobility form. Current research shows that an AM-PAC score of 17 or less is typically not associated with a discharge to the patient's home setting. Based on the patient's AM-PAC score and their current functional mobility deficits, it is recommended that the patient have 3-5 sessions per week of Physical Therapy at d/c to increase the patient's independence. Please see assessment section for further patient specific details. Assessment:  Per Podiatry, pt still has NWB orders for left foot, however since admission pt has been mobilizing with nursing staff WBAT. Pt is unable to physically maintain NWB status for any transfers/gait. IF NWB is still warranted, pt would need SNF at d/c to be able to learn/practice mobility NWB. If pt returns home, she will be non-compliant and unable to maintain WB status, so therefore recommend w/c for mobility to minimize amount of time/weight through foot. Also consider medical transport home, as pt will not be able to manage 5 steps to enter home if NWB status remains. If pt is allowed WBAT in [facility], pt does demonstrate ability to mobilize safely with walker and 1 person assist.  Pt reports already has boot at home. Recommend Podiatry team clarify WB status upon d/c. If home, 24hr assist and ongoing PT will help ensure pt safety. Equipment Needs:  Wheel Chair (if home)    Chart Reviewed: Yes   Restrictions/Precautions: Weight Bearing Other position/activity restrictions: NWB L leg; up with assist   Additional Pertinent Hx: Pt to [place] [address] ED 6/21 with shortness of breath. Pt admitted to [facility] for heart failure. CXR (-). Podiatry consult for LE wounds. Imaging (-) B LEs for ankle fx/osteomyelitis. NWB L LE until pt gets a [facility].   PMH:  CHF, OA, asthma, CAD, COPD, DM, ETOH use, MI, CABG      Diagnosis: Heart Failure   Treatment Diagnosis: Decreased gait due to heart failure      Subjective:  Pt found supine in bed. \"I am tired. I just want to go home. \"    Pain:  Denies      Objective:    Bed mobility  Supine to sit:  SBA (HOB raised, using rail)  Sit to Supine:  CGA (increased effort to lift LEs up into bed.)    Transfers  Sit to stand:  CGA (to walker, unable to keep NWB status)  Stand to sit:  CGA (unable to keep NWB status)    Ambulation  Assistance Level:  CGA  Assistive device:  Rolling walker  Distance:  10ft   Quality of gait:  WBAT- unable to keep NWB status, wide GABINO, decreased step length and step height. Patient Education  NWB status. Pt verbalizes partial understanding. Safety Devices  Pt left with needs in reach, supine in bed with alarm in place and RN aware. AM-PAC score  AM-PAC Inpatient Mobility Raw Score : 10  AM-PAC Inpatient T-Scale Score : 32.29  Mobility Inpatient CMS 0-100% Score: 76.75  Mobility Inpatient CMS G-Code Modifier : CL    Goals:  Time Frame for Short term goals: Discharge  Short term goal 1: sit <> stand CGA with keeping NWB LLE - ongoing   Short term goal 2: stand pivot transfers CGA with keeping NWB LLE - ongoing     Plan:  Times per week: 2-5;    Current Treatment Recommendations: Transfer Training, Functional Mobility Training, Strengthening    Therapy Time    Individual  Concurrent  Group  Co-treatment    Time In  1045            Time Out  1110            Minutes  25            Timed Code Treatment Minutes:  25  Total Treatment Minutes:  25      If patient is discharged prior to next treatment, this note will serve as the discharge summary.     Javan Adair, PT

## 2021-07-01 NOTE — PROGRESS NOTES
See results for a blood glucose of 54. This nurse gave pt 3 orange juice and peanut butter crackers. Assessed blood glucose at this time and increased to 108. Pt continues to manipulate skin and open areas causing the areas to bleed. This nurse has cleansed multuiple times through the night. Pt continues to removed dressings and manipulate open area.

## 2021-07-01 NOTE — PROGRESS NOTES
MRI was reviewed. Sodium slowly increasing at 127, creatinine improving at 2.4 from 2.5-2.7, I's and O's negative. Patient remains off supplemental oxygen. Patient is anxious to go home today.    -I agree with Dr. Yesenia Verde. Patient to go home on Bumex 2 mg twice daily, spironolactone 25 twice daily, tolvaptan 15 mg q. Monday Wednesday Friday for 2 weeks, twice weekly BMP and then weekly. Patient may be discharged home today on the above meds and follow up with me in 1 week with a BMP prior to visit. Please call me with any questions. I would like to thank you for providing me the opportunity to participate in the care of your patient. If you have any questions, please do not hesitate to contact me.      Abrahan Cowart MD, [facility] [place], [address]  The [address]  [address] [place], [place] [id number]  Ph: [phone]  Fax: [phone]

## 2021-07-01 NOTE — DISCHARGE SUMMARY
Friday on discharge. Patient follow-up with nephrology.     #SHAUNNA on CKD stage IV  Creatinine 1.7-1.9 appears to be baseline. Creatinine was monitored. Nephrology was consulted. Diuresis was continued. Per nephrology, patient would likely need dialysis before the end of the year. Patient to follow-up with nephrology outpatient. #Diabetes mellitus type 2  HbA1c 11.9% 5/20/2021. Blood glucose was monitored and remained on lower side. Lantus and bedtime Humalog was held due to hypoglycemia. Sliding scale insulin with nutritional supplement was continued.     #Chronic venous ulcers  Podiatry was consulted, recommended no intervention      Physical Exam Performed:     /74   Pulse 85   Temp 97.3 °F (36.3 °C) (Oral)   Resp 18   Ht 5' 4\" (1.626 m)   Wt 266 lb 12.1 oz (121 kg)   LMP 10/24/2012   SpO2 100%   BMI 45.79 kg/m²       General appearance:  No apparent distress, appears stated age and cooperative. Morbidly obese  HEENT:  Normal cephalic, atraumatic without obvious deformity. Pupils equal, round, and reactive to light. Extra ocular muscles intact. Conjunctivae/corneas clear. Neck: Supple, with full range of motion. No jugular venous distention. Trachea midline. Respiratory: Mild right basilar crackles, no wheezing or rhonchi noted  Cardiovascular: Irregularly irregular rhythm with normal Q1/Y1, grade 2 systolic murmur  Abdomen: Soft, non-tender, non-distended with normal bowel sounds. Musculoskeletal: Bilateral lower extremity 2+ edema  Skin: Skin color, texture, turgor normal.  No rashes or lesions. Neurologic:  Neurovascularly intact without any focal sensory/motor deficits. Cranial nerves: II-XII intact, grossly non-focal.  Psychiatric:  Alert and oriented, thought content appropriate, normal insight  Capillary Refill: Brisk,< 3 seconds   Peripheral Pulses: +2 palpable, equal bilaterally       Labs:  For convenience and continuity at follow-up the following most recent labs are provided:      CBC:    Lab Results   Component Value Date    WBC 7.8 07/01/2021    HGB 12.3 07/01/2021    HCT 36.6 07/01/2021     07/01/2021       Renal:    Lab Results   Component Value Date     07/01/2021    K 3.4 07/01/2021    K 3.0 06/29/2021    CL 84 07/01/2021    CO2 28 07/01/2021    BUN 77 07/01/2021    CREATININE 2.4 07/01/2021    CALCIUM 9.5 07/01/2021    PHOS 4.1 06/30/2021         Significant Diagnostic Studies    Radiology:   US RENAL COMPLETE   Final Result   No hydronephrosis. XR CHEST PORTABLE   Final Result      No acute disease         XR CHEST 1 VIEW   Final Result     No focal consolidation or significant alteration from the previous    examination. No radiographic evidence for pulmonary vascular congestion. XR FOOT LEFT (MIN 3 VIEWS)   Final Result   1. Soft tissue swelling and evidence of neuropathic change in the midfoot. No convincing evidence of osteomyelitis or acute osseous abnormality. XR ANKLE LEFT (MIN 3 VIEWS)   Final Result   1. Ulceration of the distal calf with diffuse soft tissue swelling. No evidence for underlying osteomyelitis. 2. Neuropathic change in the midfoot. 3. Calcaneal enthesopathy. XR TIBIA FIBULA LEFT (2 VIEWS)   Final Result      No evidence of fracture or bony abnormality seen. XR FOOT RIGHT (MIN 3 VIEWS)   Final Result   1. Soft tissue swelling without acute osseous abnormality. 2. Calcaneal enthesopathy. 3. Polyarticular osteoarthritis. XR ANKLE RIGHT (MIN 3 VIEWS)   Final Result   1. Soft tissue swelling without significant osseous abnormality otherwise. XR TIBIA FIBULA RIGHT (2 VIEWS)   Final Result   1. Diffuse soft tissue swelling without acute osseous abnormality.              Consults:     IP CONSULT TO CARDIOLOGY  IP CONSULT TO NEPHROLOGY  IP CONSULT TO PHARMACY  IP CONSULT TO PODIATRY  IP CONSULT TO VASCULAR SURGERY  IP CONSULT TO DIETITIAN  IP CONSULT TO HOME CARE NEEDS    Disposition: Home with home health    Condition at Discharge: Stable    Discharge Instructions/Follow-up:  Take medication as directed  Follow up with nephrology and cardiology  Follow up with podiatry  Follow up with PCP    Code Status: Full code    Activity: activity as tolerated    Diet: cardiac diet      Discharge Medications:     Current Discharge Medication List           Details   apixaban (ELIQUIS) 2.5 MG TABS tablet Take 2 tablets by mouth 2 times daily  Qty: 60 tablet, Refills: 1      hydrALAZINE (APRESOLINE) 10 MG tablet Take 1 tablet by mouth every 8 hours  Qty: 90 tablet, Refills: 3      metoprolol tartrate (LOPRESSOR) 50 MG tablet Take 1 tablet by mouth 2 times daily  Qty: 60 tablet, Refills: 3      bumetanide (BUMEX) 2 MG tablet Take 1 tablet by mouth 2 times daily  Qty: 30 tablet, Refills: 3      tolvaptan (SAMSCA) 15 MG TABS tablet Take 1 tab Monday, Wednesday and Friday  Qty: 30 tablet, Refills: 1      Cholecalciferol (VITAMIN D) 25 MCG TABS Take 1 tablet by mouth daily  Qty: 60 tablet, Refills: 1    Comments: Labeling may look different. 25 mcg=1000 Units. Please double check dosages. Details   Insulin Degludec (TRESIBA FLEXTOUCH) 100 UNIT/ML SOPN Inject 3 Units into the skin nightly  Qty: 10 pen, Refills: 5    Associated Diagnoses: Diabetes mellitus type 2 in obese (HCC)      QUEtiapine (SEROQUEL) 25 MG tablet Take 1 tablet by mouth nightly  Qty: 30 tablet, Refills: 0              Details   gabapentin (NEURONTIN) 300 MG capsule Take 300 mg by mouth 3 times daily. budesonide-formoterol (SYMBICORT) 160-4.5 MCG/ACT AERO Inhale 2 puffs into the lungs 2 times daily  Qty: 1 Inhaler, Refills: 5      nitroGLYCERIN (NITROSTAT) 0.4 MG SL tablet Place 1 tablet under the tongue every 5 minutes as needed for Chest pain up to max of 3 total doses. If no relief after 1 dose, call 911.   Qty: 25 tablet, Refills: 0      atorvastatin (LIPITOR) 80 MG tablet TAKE 1 TABLET BY MOUTH EVERY DAY AT NIGHT  Qty: 90 tablet, Refills: 3    Comments: DX Code Needed  . Associated Diagnoses: Mixed hyperlipidemia; Coronary artery disease involving native coronary artery of native heart with angina pectoris (HCC); S/P CABG (coronary artery bypass graft)      tiotropium (SPIRIVA RESPIMAT) 2.5 MCG/ACT AERS inhaler INHALE 2 PUFFS INTO THE LUNGS DAILY  Qty: 1 Inhaler, Refills: 6      benztropine (COGENTIN) 1 MG tablet Take 1 mg by mouth nightly       pantoprazole (PROTONIX) 40 MG tablet Take 1 tablet by mouth 2 times daily  Qty: 60 tablet, Refills: 0      clopidogrel (PLAVIX) 75 MG tablet TAKE 1 TABLET BY MOUTH EVERY DAY  Qty: 30 tablet, Refills: 5      magnesium oxide (MAG-OX) 400 (240 Mg) MG tablet TAKE 1 TABLET ONCE DAILY  Qty: 30 tablet, Refills: 11      busPIRone (BUSPAR) 30 MG tablet Take 30 mg by mouth 2 times daily       aspirin EC 81 MG EC tablet Take 1 tablet by mouth daily  Qty: 30 tablet, Refills: 3      ARIPiprazole (ABILIFY) 15 MG tablet Take 15 mg by mouth daily       lamoTRIgine (LAMICTAL) 200 MG tablet Take 200 mg by mouth 2 times daily       Blood Glucose Monitoring Suppl (ONE TOUCH ULTRA 2) w/Device KIT USE TO MONITOR BLOOD GLUCOSE LEVELS  Qty: 1 kit, Refills: 0    Associated Diagnoses: Type 2 diabetes mellitus with diabetic polyneuropathy, with long-term current use of insulin ([facility])      ONE TOUCH ULTRASOFT LANCETS MISC USE TO TEST BLOOD GLUCOSE LEVELS 4 TIMES DAILY  Qty: 150 each, Refills: 3    Associated Diagnoses: Type 2 diabetes mellitus with diabetic polyneuropathy, with long-term current use of insulin ([facility])      ! ! blood glucose test strips (ASCENSIA AUTODISC VI;ONE TOUCH ULTRA TEST VI) strip USE TO MONITOR BLOOD GLUCOSE LEVELS 4 TIMES DAILY  Qty: 150 each, Refills: 3    Associated Diagnoses: Type 2 diabetes mellitus with diabetic polyneuropathy, with long-term current use of insulin ([facility])      Insulin Pen Needle (B-D ULTRAFINE III SHORT PEN) 31G X 8 MM MISC Five shots a day  Qty: 200 each, Refills: 5    Associated Diagnoses: Diabetes mellitus type 2 in obese [facility])      ! ! blood glucose test strips (FREESTYLE LITE) strip USE TO CHECK BLOOD GLUCOSE LEVELS FOUR TIMES DAILY  Qty: 200 strip, Refills: 10    Associated Diagnoses: Diabetes mellitus type 2 in obese (HCC)      INSULIN SYRINGE .5CC/29G 29G X 1/2\" 0.5 ML MISC 1 each by Does not apply route daily  Qty: 100 each, Refills: 3    Associated Diagnoses: Diabetes mellitus type 2 in obese [facility])       ! ! - Potential duplicate medications found. Please discuss with provider. Time Spent on discharge is more than 30 minutes in the examination, evaluation, counseling and review of medications and discharge plan. Signed:    Greg Irwin MD   7/1/2021      Thank you Alfredo Bailon PA-C for the opportunity to be involved in this patient's care. If you have any questions or concerns please feel free to contact me at [phone].

## 2021-07-01 NOTE — PLAN OF CARE
Increase independence with functional mobility and gait back to baseline.
Increase independence with functional transfers and ADLs.
Patient has cardiomems PA pressure sensor and noted to be admitted; she is followed by [facility] team.   Patient has not been compliant with readings from home as her last reading was taken back in 4/1/21. Please see below reading, which was obtained by the cardiomems rep.            Cora Escamilla CNP, 7/1/2021, 11:46 AM  [place] [facility]   [phone]
Podiatric Surgery Plan of Care Note  Alejandra Paez    Patient's bilateral lower extremity wounds are stable and without acute signs of infection this time. Nurse communication placed for daily dressing changes. Podiatry will see patient Monday if patient remains in house. If patient discharges prior to Monday, patient is to follow-up with her previously established podiatrist within 1 week of discharge. If she is unable to follow-up with her previously established podiatrist, she may follow-up with Dr. Rich Lazo within 1 week of discharge.     Discussed with Dr. Rich Lazo DPM.    Adela Merino DPM  Podiatric Resident, PGY-1  Pager #: [phone] or Mari Cruz
Problem: Falls - Risk of:  Goal: Absence of physical injury  Description: Absence of physical injury  Outcome: Met This Shift  Note: Pt remained free from physical injury during shift. Problem: Pain:  Goal: Control of acute pain  Description: Control of acute pain  Outcome: Met This Shift  Note: Pt did not complain of any acute pain during shift. Problem: OXYGENATION/RESPIRATORY FUNCTION  Goal: Patient will maintain patent airway  Outcome: Met This Shift  Note: Pt able to maintain a patent airway. Problem: OXYGENATION/RESPIRATORY FUNCTION  Goal: Patient will achieve/maintain normal respiratory rate/effort  Description: Respiratory rate and effort will be within normal limits for the patient  Outcome: Met This Shift  Note: Pt's respiratory rate and effort have been normal during shift. Problem: Skin Integrity:  Goal: Absence of new skin breakdown  Description: Absence of new skin breakdown  Outcome: Met This Shift  Note: Pt remained free from new skin breakdown. Problem: Falls - Risk of:  Goal: Will remain free from falls  Description: Will remain free from falls  Outcome: Ongoing  Note: Pt remained free from falls during shift. Pt is A&Ox4, calls out to staff for assistance and ambulates with a johny stedy to help prevent bearing weight on left leg. Pt sets off bed alarm by sitting on the edge of the bed and pt has turned the bed alarm off herself. Pt's bed is locked in lowest position with alarm on, call light, bedside table, and personal belongings within reach. Problem: Pain:  Goal: Pain level will decrease  Description: Pain level will decrease  Outcome: Ongoing  Note: Pt complains of chronic back pain during shift. Pt received PRN percocet to help with the pain. Problem: Pain:  Goal: Control of chronic pain  Description: Control of chronic pain  Outcome: Ongoing  Note: Pt complains of chronic back pain during shift. Pt received PRN percocet to help with the pain.
Problem: Falls - Risk of:  Goal: Will remain free from falls  Description: Will remain free from falls  6/28/2021 1837 by Karma Felder RN  Outcome: Ongoing     Problem: Falls - Risk of:  Goal: Absence of physical injury  Description: Absence of physical injury  6/28/2021 1837 by Karma Felder RN  Outcome: Ongoing     Problem: Pain:  Goal: Pain level will decrease  Description: Pain level will decrease  6/28/2021 1837 by Karma Felder RN  Outcome: Ongoing     Problem: Pain:  Goal: Control of acute pain  Description: Control of acute pain  6/28/2021 1837 by Karma Felder RN  Outcome: Ongoing     Problem: Pain:  Goal: Control of chronic pain  Description: Control of chronic pain  6/28/2021 1837 by Karma Felder RN  Outcome: Ongoing     Problem: Nutrition  Goal: Optimal nutrition therapy  6/28/2021 1837 by Karma Felder RN  Outcome: Ongoing     Problem: OXYGENATION/RESPIRATORY FUNCTION  Goal: Patient will maintain patent airway  6/28/2021 1837 by Karma Felder RN  Outcome: Ongoing     Problem: OXYGENATION/RESPIRATORY FUNCTION  Goal: Patient will achieve/maintain normal respiratory rate/effort  Description: Respiratory rate and effort will be within normal limits for the patient  6/28/2021 1837 by Karma Felder RN  Outcome: Ongoing     Problem: FLUID AND ELECTROLYTE IMBALANCE  Goal: Fluid and electrolyte balance are achieved/maintained  Outcome: Ongoing     Problem: Skin Integrity:  Goal: Absence of new skin breakdown  Description: Absence of new skin breakdown  6/28/2021 1837 by Karma Felder RN  Outcome: Ongoing
Problem: Falls - Risk of:  Goal: Will remain free from falls  Description: Will remain free from falls  Outcome: Met This Shift  Note: Pt remained free from falls during shift. Pt is A&Ox4, calls out to staff for assistance and does not try to get up on her own. Pt occasionally sets off bed alarm by sitting on edge of bed. Pt's bed is locked in lowest position with alarm on, call light, bedside table, and personal belongings within reach. Problem: Falls - Risk of:  Goal: Absence of physical injury  Description: Absence of physical injury  Outcome: Met This Shift  Note: Pt remained free from physical injury during shift. Problem: Pain:  Goal: Control of acute pain  Description: Control of acute pain  Outcome: Met This Shift  Note: Pt has not complained of any acute pain. Problem: Pain:  Goal: Pain level will decrease  Description: Pain level will decrease  Outcome: Ongoing  Note: Pt complained of chronic back pain. Pt has PRN percocet ordered to help make her more comfortable. Pt states that the percocet lowers her pain some. Problem: Pain:  Goal: Control of chronic pain  Description: Control of chronic pain  Outcome: Ongoing  Note: Pt complained of chronic back pain. Pt has PRN percocet ordered to help make her more comfortable. Pt states that the percocet lowers her pain some.
Problem: Falls - Risk of:  Goal: Will remain free from falls  Description: Will remain free from falls  Outcome: Met This Shift  Note: Pt remained free from falls during shift. Pt is A&Ox4, calls out to staff for assistance, does not try to get up on her own. Pt's bed is locked in lowest position with alarm on, call light, bedside table, and personal belongings within reach. Will continue to monitor. Problem: Falls - Risk of:  Goal: Absence of physical injury  Description: Absence of physical injury  Outcome: Met This Shift  Note: Pt remained free from physical injury during shift. Problem: Pain:  Goal: Control of acute pain  Description: Control of acute pain  Outcome: Met This Shift  Note: Pt did not complain of any acute pain during shift. Problem: Pain:  Goal: Pain level will decrease  Description: Pain level will decrease  Outcome: Ongoing  Note: Pt complained of chronic pain during shift. Pt received PRN oxycodone to help with pain. Problem: Pain:  Goal: Control of chronic pain  Description: Control of chronic pain  Outcome: Ongoing  Note: Pt complained of chronic pain during shift. Pt received PRN oxycodone to help with pain.
Problem: Falls - Risk of:  Goal: Will remain free from falls  Description: Will remain free from falls  Outcome: Met This Shift  Note: Pt remained free from falls during shift. Pt is A&Ox4, calls out to staff for help but tries to sit on edge of the bed setting the bed alarm off. Pt seen turning off her bed alarm so she could sit on the edge and not set off alarm. Pt educated on bed alarm, importance of calling staff and not turning off alarm, and fall preventions. Pt's bed locked in lowest position with alarm on, call light, bedside table, and personal belongings within reach. Problem: Falls - Risk of:  Goal: Absence of physical injury  Description: Absence of physical injury  Outcome: Met This Shift  Note: Pt remained free from physical injury during shift. Problem: Pain:  Goal: Control of acute pain  Description: Control of acute pain  Outcome: Met This Shift  Note: Pt has not complained of any acute pain during shift. Problem: OXYGENATION/RESPIRATORY FUNCTION  Goal: Patient will maintain patent airway  Outcome: Met This Shift  Note: Pt has maintained a patent airway during shift. Problem: OXYGENATION/RESPIRATORY FUNCTION  Goal: Patient will achieve/maintain normal respiratory rate/effort  Description: Respiratory rate and effort will be within normal limits for the patient  Outcome: Met This Shift  Note: Pt's respiratory rate and effort normal during shift. Problem: Pain:  Goal: Pain level will decrease  Description: Pain level will decrease  Outcome: Ongoing  Note: Pt complained of chronic back pain during shift. Pt receives PRN percocet to help make her more comfortable. Problem: Pain:  Goal: Control of chronic pain  Description: Control of chronic pain  Outcome: Ongoing  Note: Pt complained of chronic back pain during shift. Pt receives PRN percocet to help make her more comfortable.
Problem: Falls - Risk of:  Goal: Will remain free from falls  Description: Will remain free from falls  Outcome: Ongoing     Problem: Falls - Risk of:  Goal: Absence of physical injury  Description: Absence of physical injury  Outcome: Ongoing
Problem: Falls - Risk of:  Goal: Will remain free from falls  Description: Will remain free from falls  Outcome: Ongoing     Problem: Falls - Risk of:  Goal: Absence of physical injury  Description: Absence of physical injury  Outcome: Ongoing     Problem: Pain:  Goal: Pain level will decrease  Description: Pain level will decrease  Outcome: Ongoing     Problem: Pain:  Goal: Control of acute pain  Description: Control of acute pain  Outcome: Ongoing     Problem: Pain:  Goal: Control of chronic pain  Description: Control of chronic pain  Outcome: Ongoing     Problem: Nutrition  Goal: Optimal nutrition therapy  Outcome: Ongoing     Problem: OXYGENATION/RESPIRATORY FUNCTION  Goal: Patient will maintain patent airway  Outcome: Ongoing
Problem: Nutrition  Goal: Optimal nutrition therapy  Outcome: Ongoing  Note: Nutrition Problem #1: Increased nutrient needs  Intervention: Food and/or Nutrient Delivery: Continue Current Diet, Modify Oral Nutrition Supplement  Nutritional Goals: Pt will consistently consume >50% of meals and supplements during admission.
Problem: Nutrition  Goal: Optimal nutrition therapy  Outcome: Ongoing  Note: Nutrition Problem #1: Increased nutrient needs  Intervention: Food and/or Nutrient Delivery: Continue Current Diet, Start Oral Nutrition Supplement  Nutritional Goals: Pt will consistently consume >50% of meals and supplements during admission.
Problem: OXYGENATION/RESPIRATORY FUNCTION  Goal: Patient will achieve/maintain normal respiratory rate/effort  Description: Respiratory rate and effort will be within normal limits for the patient  6/27/2021 1656 by Al June RN  Note: Respirations easy at rest. Breath sounds diminished. She is short of breath with minimal exertion. Pulse ox in the 90's on room air. Will monitor. Problem: Skin Integrity:  Goal: Absence of new skin breakdown  Description: Absence of new skin breakdown  6/27/2021 1656 by Al June RN  Note: She has multiple vascular ulcers to her BLE. All open areas painted with betadine. Encouraged to keep legs elevated. She has multiple bruises and bleeding areas to her BUE. Mepilex to those areas. She repositions on her own. Will monitor. Problem: Falls - Risk of:  Goal: Will remain free from falls  Description: Will remain free from falls  6/27/2021 1656 by Al June RN  Note: She is a fall risk. She calls for assistance out of bed. Call light in reach. No skid socks on. Will monitor. Problem: Pain:  Goal: Control of chronic pain  Description: Control of chronic pain  6/27/2021 1656 by Al June RN  Note: She has chronic back pain. Percocet available for pain. Patient aware. Will monitor. Problem: Nutrition  Goal: Optimal nutrition therapy  Note: Appetite is poor. Diabetic supplements ordered. Will monitor. Problem: FLUID AND ELECTROLYTE IMBALANCE  Goal: Fluid and electrolyte balance are achieved/maintained  Note: She has generalized edema. Creatinine now 2.2. IVF stopped, and she was given 60 mg lasix IV, followed by a drip at 5 mg/hr. Will monitor I/O.
lowest position with side rails up x3. Bed exit alarm in use. Nonskid footwear in use. Pt educated on call light system and instructed to use call light prior to getting up. Pt calls out appropriately for needs but is impulsive at times. Call light and personal belongings within reach. Pt up with standby assist and tolerates fairly well. Hourly rounding in place.

## 2021-07-01 NOTE — CARE COORDINATION
CTN contacted Coral with [facility] [phone]. They have accepted referral. Faxed orders to [phone]. Patient aware of services and agreeable. Electronically signed by Radha Ordaz LPN on 8/1/4477 at 53:95 PM    Received call back from Coral that they are unable to accept patient due to out of network with that insurance plan. Orders faxed to   CTN contacted Ashley with [employer] [phone]. They have accepted this patient and will pull referral from [facility]. They will contact patient and make arrangements for [facility].    Electronically signed by Radha Ordaz LPN on 6/8/2216 at 0:58 PM

## 2021-07-01 NOTE — CARE COORDINATION
SUSANNE  Following, Spoke with Nik Valdez [phone] with [facility] and Pt's new precert has been started. Pt plans to Dc to [facility] once she is medically stable and off Hep gtt, Pt will need transport. Electronically signed by KATIE Best, KARSON on 7/1/2021 at 9:23 [phone]    SUSANNE spoke with Dr. Deny Valdes who asked to have PT/OT see the patient ASAP in order to determine if she could go home with [facility]. Pt is very anxious to go home.  SUSANNE spoke with German Ladd [phone] about having PT/OT see the Pt this AM.     Electronically signed by KATIE Best, KARSON on 7/1/2021 at 10:21 [phone]

## 2021-07-01 NOTE — PROGRESS NOTES
Occupational Therapy  Facility/Department: [address]  Daily Treatment Note  NAME: Clint Abraham  : 1965  MRN: [id number]    Date of Service: 2021    Discharge Recommendations: Clint Abraham scored a 13/24 on the AM-PAC ADL Inpatient form. Current research shows that an AM-PAC score of 17 or less is typically not associated with a discharge to the patient's home setting. Based on the patient's AM-PAC score and their current ADL deficits, it is recommended that the patient have 3-5 sessions per week of Occupational Therapy at d/c to increase the patient's independence. Please see assessment section for further patient specific details. If patient discharges prior to next session this note will serve as a discharge summary. Please see below for the latest assessment towards goals. If home will require 24-hr assistance and  HOME HEALTH CARE: LEVEL 1 STANDARD    - Initial home health evaluation to occur within 24-48 hours, in patient home   - Therapy to evaluate with goal of regaining prior level of functioning   - Therapy to evaluate if patient has [address] needs for personal care     OT Equipment Recommendations  Equipment Needed: Yes  Mobility Devices: Wheelchair  Wheelchair: Light Weight; Wheelchair Cushion Standard  Other: Pt reports already owning recommended 3-in-1 commode and shower bench    Assessment   Performance deficits / Impairments: Decreased functional mobility ; Decreased ADL status; Decreased endurance;Decreased safe awareness;Decreased cognition  Assessment: During this hospitalization pt has been unable to adhere to NWB precautions on L LE. Per pt report, she has been up with RN staff WBAT and has been physically unable to keep weight off foot. Per podiatry team, pt continues to be NWB and if follows these restrictions requires maximal assistance for all ADLs and need for maxi move for transfers.  Pt would benefit from ongoing inpatient therapy services at discharge to maximize adherence to NWB precautions and increase strength required for performing functional transfers. If pt were to discharge home, pt would require 24hr asssitance, HHOT/PT, medical transport into home 2* 5 steps to enter, and needs wheelchair to reduce time spent on BLE. Pt verbalizes that she already has 3-in-1 commode and bench for shower. Will cont to follow on acute OT POC. Treatment Diagnosis: decreased independence with ADLs and fx mobility secondary to NWB status on LLE  OT Education: OT Role;Plan of Care;Transfer Training;Equipment;Precautions  Patient Education: pt requires inforcement  REQUIRES OT FOLLOW UP: Yes  Activity Tolerance  Activity Tolerance: Treatment limited secondary to decreased cognition;Patient Tolerated treatment well;Patient limited by fatigue  Activity Tolerance: Pt is unable to adhere to L LE NWB precautions throughout session; pt reports feeling fatigued from lack of sleep  Safety Devices  Safety Devices in place: Yes  Type of devices: All fall risk precautions in place;Nurse notified; Bed alarm in place; Left in bed;Call light within reach         Patient Diagnosis(es): The encounter diagnosis was Diabetes mellitus type 2 in obese ([address]       has a past medical history of Acute blood loss anemia, Acute kidney injury ([address] Acute kidney injury superimposed on CKD ([address] Acute on chronic combined systolic and diastolic congestive heart failure ([address] Acute on chronic right-sided heart failure ([address] Alcohol dependence ([address] Arthritis, Asthma, CAD (coronary artery disease), Cellulitis, COPD (chronic obstructive pulmonary disease) ([address] Diabetic ulcer of left foot associated with type 2 diabetes mellitus, limited to breakdown of skin ([address] Diabetic ulcer of toe of right foot associated with type 2 diabetes mellitus ([address] Left renal artery stenosis ([address] MI (myocardial infarction) ([address] Positive FIT (fecal immunochemical test), 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 has a past surgical history that includes Tonsillectomy; Cholecystectomy; tumor excision; Upper gastrointestinal endoscopy (4/25/2013); Upper gastrointestinal endoscopy (12/10/2015); Upper gastrointestinal endoscopy (01/06/2017); Arterial bypass surgry (Left, 01/06/2016); Cardiac catheterization (03/27/2018); Coronary angioplasty with stent (03/16/2018); Rotator cuff repair (Left, 04/22/2016); Coronary angioplasty with stent (01/03/2018); Insertable Cardiac Monitor (02/14/2019); femoral bypass (Right, 5/16/2019); transluminal angioplasty (Left, 10/08/2019); Cardiac catheterization (01/20/2020); Coronary artery bypass graft (N/A, 1/27/2020); vascular surgery (Left, 12/08/2015); transluminal angioplasty (Left, 04/29/2020); vascular surgery (Bilateral, 07/07/2020); Coronary angioplasty with stent (10/07/2019); transesophageal echocardiogram (01/26/2020); vascular surgery (Left, 08/04/2020); vascular surgery (Left, 08/05/2020); and vascular surgery (Left, 09/01/2020). Restrictions  Restrictions/Precautions  Restrictions/Precautions: Weight Bearing  Lower Extremity Weight Bearing Restrictions  Left Lower Extremity Weight Bearing: Non Weight Bearing  Position Activity Restriction  Other position/activity restrictions: NWB L leg; up with assist  Subjective   General  Chart Reviewed: Yes  Patient assessed for rehabilitation services?: Yes  Additional Pertinent Hx: 60 y.o. female with a PMHx of CAD s/p PCI to LAD and circumflex 2018, s/p CABG x1 vessel (LIMA to the LAD, 01/2020), HFrEF due to H[facility]P DEL [facility], s/p CardioMEMS implant (02/2019), moderate MR, PAD s/p RA stent, CKD, DM, COPD, chronic hyponatremia, chronic leg wounds. Pt now NWB on LLE.   Family / Caregiver Present: No  Referring Practitioner: Daniella Zafar DPM  Diagnosis: heart failure  Subjective  Subjective: Pt lying supine in bed upon OT arrival. Pt agreeable to OT treatment safety  Problem Solving: Decreased awareness of errors  Insights: Decreased awareness of deficits  Initiation: Requires cues for some  Sequencing: Requires cues for all  Cognition Comment: Pt with lack of insight on importance of not baring weight through LLE; pt saying she is okay with SNF d/c plan but then also referencing home discharge as well and displaying little insight/awareness of what would need to happen in home setting to allow for recovery                                         Plan   Plan  Times per week: 2-5  Current Treatment Recommendations: Balance Training, Functional Mobility Training, Endurance Training, Self-Care / ADL, ROM, Safety Education & Training, Wheelchair Mobility Training, Patient/Caregiver Education & Training  G-Code     OutComes Score                                                  AM-PAC Score        AM-PAC Inpatient Daily Activity Raw Score: 13 (07/01/21 1112)  AM-PAC Inpatient ADL T-Scale Score : 32.03 (07/01/21 1112)  ADL Inpatient CMS 0-100% Score: 63.03 (07/01/21 1112)  ADL Inpatient CMS G-Code Modifier : CL (07/01/21 1112)    Goals  Short term goals  Time Frame for Short term goals: by discharge  Short term goal 1: Pt will perform sit-stand transfer while following NWB restriction on LLE with no more than CGA- not met  Short term goal 2: Pt will perform LB dressing via adaptive technique while following NWB status with set-up- not met  Short term goal 3: Pt will perform toileting assessment- not met  Short term goal 4: pt to perform 10-12 reps B UE AAROM/AROM exerc to increase activity tolerance/transfers- not met  Patient Goals   Patient goals : return home       Therapy Time   Individual Concurrent Group Co-treatment   Time In 1050         Time Out 1115         Minutes 25         Timed Code Treatment Minutes: [id number] [facility],

## 2021-07-02 PROBLEM — R53.1 GENERALIZED WEAKNESS: Status: ACTIVE | Noted: 2021-01-01

## 2021-07-02 NOTE — ED NOTES
Fall risk screening completed. Fall risk bracelet applied to patient. Non-skid socks provided and placed on patient. The fall risk is indicated using  dome light . Based on score, a bed alarm was indicated and applied. The call light is within the patient's reach, and instructions for use were provided. The bed is in the lowest position with wheels locked. The patient has been advised to notify staff, using the call light, if there is a need to get up or use restroom. The patient verbalized understanding of safety precautions and how to contact staff for assistance.        Randee Main RN  07/02/21 8947

## 2021-07-02 NOTE — ED NOTES
Bed: 31  Expected date:   Expected time:   Means of arrival: [address]  Comments:  Estella Marsh, RAYMUNDO  07/02/21 1877

## 2021-07-02 NOTE — ED PROVIDER NOTES
Patient was seen and evaluated by myself in conjunction with nurse practitioner. History and physical exam were independently performed on Netflix. All diagnostic, treatment, and disposition decisions were made by myself in conjunction with nurse practitioner. Please see note completed by Keiry Coleman NP for full details of patient's visit. Patient presents to the emergency department complaining of \"gait problem. \"Patient states that she was recently admitted to [facility]. for diabetes management/CHF. Patient rates reportedly at her baseline yesterday when discharged but states that she has been unable to ambulate or attend to ADLs today. Daughter is present at bedside and states that she has been been present at the house but has had a difficult time even getting her out of the chair. Patient denies fevers, chills, or sweats. No cough, congestion. No chest pain or shortness of breath reported. Patient arrives into the emergency department with normal pulse, temp, and oxygen saturation. Physical exam: General: No acute distress. Head: Normocephalic/atraumatic. HEENT: Dry mucous membranes. No oropharyngeal swelling or edema. Heart: Regular rate and rhythm. Normal S1-S2. Lungs: Clear to auscultation bilaterally. No wheezes, rales, rhonchi. Abdomen: Soft. Extremities: No swelling or edema. NIH: 0: Cranial nerves II through XII intact and muscle strength 5/5 in all extremities. Sensation intact. Unable to test gait secondary to weakness. No truncal instability. EKG: Sinus tachycardia with a rate of 114. Left axis deviation. Right ventricular conduction delay noted. Anterior/inferior Q-wave inversion noted. No significant change compared to previous EKG on 6/21/2021. Assessment/plan:    1.  Generalized weakness           Bill Long,   07/02/21 2274

## 2021-07-02 NOTE — ED NOTES
Unable to obtain PIV access. Straight stuck pt for blood draws. CNP aware.      Charan Johnson, RAYMUNDO  07/02/21 3242

## 2021-07-02 NOTE — TELEPHONE ENCOUNTER
Writer contacted Dr. Veronica Dominguez to inform of 30 day readmission risk. Writer's attempt to contact Dr. Veronica Dominguez was unsuccessful.

## 2021-07-03 NOTE — ED NOTES
Bedside report given to Miranda Lackey RN. Pt left ED via stretcher in stable condition at this time. Pt alert, VSS, room air. All belongings sent with pt. Care transferred.      Cyndee Vega RN  07/02/21 2044

## 2021-07-03 NOTE — DISCHARGE INSTR - COC
Continuity of Care Form    Patient Name: Dulce Nolan   :  1965  MRN:  [id number]    Admit date:  2021  Discharge date:  ***    Code Status Order: Full Code   Advance Directives:   Advance Care Flowsheet Documentation       Date/Time Healthcare Directive Type of Healthcare Directive Copy in [address] Agent's Name Healthcare Agent's Phone Number    21 [phone]  No, patient does not have an advance directive for healthcare treatment -- -- -- -- --            Admitting Physician:  Zoran Rebollar MD  PCP: Alice Llanes PA-C    Discharging Nurse: [facility]/Room#: 9090/9957-13  Discharging Unit Phone Number: ***    Emergency Contact:   Extended Emergency Contact Information  Primary Emergency Contact: Nataliia Cerda \"Lanny\"  Home Phone: [phone]  Relation: Child  Preferred language: English   needed?  No  Secondary Emergency Contact: [address] [place] [address] Phone: [phone]  Relation: Other    Past Surgical History:  Past Surgical History:   Procedure Laterality Date    ARTERIAL BYPASS SURGRY Left 2016    Axillary/Subclavian to Brachial Bypass w/reversed SVG    CARDIAC CATHETERIZATION  2018    Dr. Lolita Chand - at  [facility]  2020    Dr. Angel valenzuela post surgery    CORONARY ANGIOPLASTY WITH STENT PLACEMENT  2018    MELODY- 3.5 x 38 and 3.5 x 20 to Cx    CORONARY ANGIOPLASTY WITH STENT PLACEMENT  2018    MELODY- 3.0 x 38 and 2.75 x 26 and 2.75 x 8 and 2.75 x 22 to the LAD    CORONARY ANGIOPLASTY WITH STENT PLACEMENT  10/07/2019    MELODY- 2.5 x 8 to mLAD    CORONARY ARTERY BYPASS GRAFT N/A 2020    CORONARY ARTERY BYPASS GRAFTING X 1, ON PUMP BEATING HEART, INTERNAL MAMMARY ARTERY TO LEFT ANTERIOR DESCENDING ARTERY, VAS CATH INSERTION, URI, PLATELET GEL APPLICATION, 5 LEVEL BILATERAL INTERCOSTAL NERVE BLOCK, STERNAL PLATING performed by Renan Brown MD at [facility] Right 5/16/2019    fem-pop, performed by Noah Vasquez MD at [address] [place] [address]  02/14/2019    CardioMEMs insertion for CHF    ROTATOR CUFF REPAIR Left 04/22/2016    TONSILLECTOMY      TRANSESOPHAGEAL ECHOCARDIOGRAM  01/26/2020    TRANSLUMINAL ANGIOPLASTY Left 10/08/2019    with stenting, at [facility]    TRANSLUMINAL ANGIOPLASTY Left 04/29/2020    of the [facility] re-occlusion    TUMOR EXCISION      benign tumors X 2 chest & axilla    UPPER GASTROINTESTINAL ENDOSCOPY  4/25/2013    BX barretts, HH, gastritis    UPPER GASTROINTESTINAL ENDOSCOPY  12/10/2015    UPPER GASTROINTESTINAL ENDOSCOPY  01/06/2017    Gastritis    VASCULAR SURGERY Left 12/08/2015    upper extremity and mesenteric angiogram (diagnostic only)    VASCULAR SURGERY Bilateral 07/07/2020    diagnostic lower extremity angiogram only    VASCULAR SURGERY Left 08/04/2020    angioplasty and stent of renal artery    VASCULAR SURGERY Left 08/05/2020    coil embolization of branch renal artery vessel for bleeding    VASCULAR SURGERY Left 09/01/2020    angioplasty and stenting of subclavian artery       Immunization History:   Immunization History   Administered Date(s) Administered    Influenza Vaccine, unspecified formulation 11/04/2016    Influenza Virus Vaccine 12/11/2015, 11/21/2017    Influenza, Linda Spies, IM, (6 mo and older Fluzone, Flulaval, Fluarix and 3 yrs and older Afluria) 12/19/2013, 10/14/2016, 11/09/2017    Influenza, Quadv, IM, PF (6 mo and older Fluzone, Flulaval, Fluarix, and 3 yrs and older Afluria) 10/01/2019    Pneumococcal Polysaccharide ([id number]) 12/11/2015       Active Problems:  Patient Active Problem List   Diagnosis Code    Type 2 diabetes mellitus with diabetic polyneuropathy, with long-term current use of insulin ([facility] [address] E11.42, Z79.4    Essential hypertension I10    CLINT (obstructive sleep apnea) G47.33    Tobacco abuse disorder Z72.0    GERD (gastroesophageal reflux disease) K21.9    Anxiety and depression F41.9, F32.9    Hyponatremia E87.1    Iron deficiency anemia D50.9    CAD (coronary artery disease) I25.10    Mitral valve regurgitation I34.0    COPD (chronic obstructive pulmonary disease) ([facility]) J44.9    SHAUNNA (acute kidney injury) ([facility]) N17.9    Vitamin D deficiency E55.9    Dyslipidemia E78.5    Abel's esophagus K22.70    Acute on chronic congestive heart failure (HCC) I50.9    Viral hepatitis C B19.20    Pulmonary nodule R91.1    Class 2 severe obesity due to excess calories with serious comorbidity and body mass index (BMI) of 39.0 to 39.9 in adult ([facility]) E66.01, Z68.39    Bipolar disorder ([facility]) F31.9    Pulmonary hypertension ([facility]) I27.20    Lower extremity edema R60.0    Habitual self-excoriation F42.4    Ulcer of left lower leg, limited to breakdown of skin ([facility]) L97.921    Ulcer of right leg, limited to breakdown of skin ([facility] [address] L97.911    Arthritis M19.90    Cardiomyopathy ([facility]) I42.9    Chronic systolic CHF (congestive heart failure) ([facility]) I50.22    Chronic renal impairment N18.9    Peripheral venous insufficiency I87.2    PAD (peripheral artery disease) ([facility]) I73.9    Acute kidney injury superimposed on CKD ([facility]) N17.9, N18.9    Morbid obesity with BMI of 40.0-44.9, adult ([facility]) E66.01, Z68.41    CKD (chronic kidney disease) stage 4, GFR 15-29 ml/min ([facility]) N18.4    Hypokalemia E87.6    Dyspnea on exertion R06.00    Ischemic heart disease I25.9    Moderate protein-calorie malnutrition ([facility]) E44.0    DKA, type 2, not at goal [facility]) E11.10    Acute on chronic systolic CHF (congestive heart failure) ([facility]) E21.06    Acute systolic CHF (congestive heart failure) ([facility]) I50.21    CHF (congestive heart failure), NYHA class I, acute on chronic, combined ([facility]) I50.43    Cellulitis L03.90    Closed nondisplaced fracture of navicular bone of left foot S92.255A    Heart failure ([facility]) I50.9    Generalized weakness R53.1       Isolation/Infection: Isolation            No Isolation          Patient Infection Status       Infection Onset Added Last Indicated Last Indicated By Review Planned Expiration Resolved Resolved By    None active    Resolved    COVID-19 Rule Out 01/11/21 01/11/21 01/11/21 COVID-19 (Ordered)   01/11/21 Rule-Out Test Resulted    COVID-19 Rule Out 12/21/20 12/21/20 12/21/20 COVID-19 (Ordered)   12/22/20 Rule-Out Test Resulted    COVID-19 Rule Out 11/30/20 11/30/20 11/30/20 COVID-19 (Ordered)   11/30/20 Rule-Out Test Resulted            Nurse Assessment:  Last Vital Signs: /82   Pulse 97   Temp 97.2 °F (36.2 °C) (Oral)   Resp 16   Ht 5' 4\" (1.626 m)   Wt 267 lb 6.7 oz (121.3 kg)   LMP 10/24/2012   SpO2 98%   BMI 45.90 kg/m²     Last documented pain score (0-10 scale): Pain Level: 6  Last Weight:   Wt Readings from Last 1 Encounters:   07/02/21 267 lb 6.7 oz (121.3 kg)     Mental Status:  {IP PT MENTAL STATUS:73986}    IV Access:  { CARMEN IV ACCESS:381860183}    Nursing Mobility/ADLs:  Walking   {[facility] DME LLYZ:968219459}  Transfer  {[facility] DME MHPW:879001895}  Bathing  {[facility] DME HYFB:003630662}  Dressing  {[facility] DME ELXM:991038242}  Toileting  {[facility] DME LTHK:516838837}  Feeding  {[facility] DME KLZE:971548558}  Med Admin  {[facility] DME TMUJ:990444492}  Med Delivery   { CARMEN MED Delivery:813731144}    Wound Care Documentation and Therapy:  Wound 01/11/21 Chest (Active)   Number of days: 172       Wound 04/22/21 Leg Left wounds in clusters. (Active)   Number of days: 72       Wound 04/22/21 Toe (Comment  which one) Left cluster tip of 1 + 2, dorsal on toe 3 brown and yellow slough. (Active)   Number of days: 72       Wound 04/22/21 Leg Right (Active)   Number of days: 72        Elimination:  Continence:    Bowel: {YES / KW:38104}  Bladder: {YES / IK:41172}  Urinary Catheter: {Urinary Catheter:388436991}   Colostomy/Ileostomy/Ileal Conduit: {YES / FP:00112}       Date of Last BM: ***    Intake/Output Summary (Last 24 hours) at 7/3/2021 1540  Last data filed at 7/3/2021 0754  Gross per 24 hour   Intake 310 ml   Output 1750 ml   Net -1440 ml     I/O last 3 completed shifts: In: 310 [P.O.:60; IV Piggyback:250]  Out: 9632 [Urine:1750]    Safety Concerns:     [employer] Safety Concerns:553263235}    Impairments/Disabilities:      [employer] Impairments/Disabilities:433000729}    Nutrition Therapy:  Current Nutrition Therapy:   [employer] Diet List:735764081}    Routes of Feeding: {[facility]P DME Other Feedings:760164469}  Liquids: {Slp liquid thickness:32330}  Daily Fluid Restriction: {CHP DME Yes amt example:795461242}  Last Modified Barium Swallow with Video (Video Swallowing Test): {Done Not Done BHPO:581530777}    Treatments at the Time of Hospital Discharge:   Respiratory Treatments: ***  Oxygen Therapy:  {Therapy; copd oxygen:24819}  Ventilator:    { CC Vent QQYL:639961296}    Rehab Therapies: {THERAPEUTIC INTERVENTION:0987763140}  Weight Bearing Status/Restrictions: [employer]  Weight Bearin}  Other Medical Equipment (for information only, NOT a DME order):  {EQUIPMENT:765283829}  Other Treatments: ***    Patient's personal belongings (please select all that are sent with patient):  {[facility] DME Belongings:568452244}    RN SIGNATURE:  {Esignature:234517811}    CASE MANAGEMENT/SOCIAL WORK SECTION    Inpatient Status Date: 7/3/21    Readmission Risk Assessment Score:  Readmission Risk              Risk of Unplanned Readmission:  53           Discharging to Facility/ Agency   Name: [facility]  Address:  Phone: [phone]  Fax: [phone]    / signature: {Esignature:634006586}    PHYSICIAN SECTION    Prognosis: Good    Condition at Discharge: Stable    Rehab Potential (if transferring to Rehab): Good    Recommended Labs or Other Treatments After Discharge: PT/OT, Monitor potassium, Strict I/Os, daily weights, BiPap with naps and overnight: IPAP 16 and EPAP 6.  Can restart spironolactone 25mg po daily and metolazone 5mg MWF if Cr stable and hypokalemia controlled if needed for further diuresis. Follow up with her nephrologist in one week   Follow up with Cardiology in one week  Monitor BMP weekly    Physician Certification: I certify the above information and transfer of Marcy Mauricio  is necessary for the continuing treatment of the diagnosis listed and that she requires [place] for greater 30 days.      Update Admission H&P: No change in H&P    PHYSICIAN SIGNATURE:  Electronically signed by Mar Mccracken MD on 7/14/21 at 12:21 PM EDT

## 2021-07-03 NOTE — ED PROVIDER NOTES
EMERGENCY DEPARTMENT ENCOUNTER      This patient was seen and evaluated by the attending physician. Pt Name: Celena Louise  MRN: [id number]  [place] 1965  Date of evaluation: 7/2/2021  Provider: TRAY CabreraC  PCP: Stephanie Connelly PA-C  ED Attending: Dr. Veronica Dominguez    History provided by the patient. CHIEF COMPLAINT:     Chief Complaint   Patient presents with    Gait Problem     pt was recently discharged yesterday from [facility], pt is now having issues ambulating, EMS called several times for multiple issues,        HISTORY OF PRESENT ILLNESS:      Celena Louise is a 60 y.o. female who presents to [facility] ER with complaints of not being able to walk. Patient states that she was just discharged [facility]. after being admitted to the hospital for multiple problems, states that when she got home she was unable to ambulate. Patient states that she just feels weak, denies any unilateral symptoms, complaints that her legs and arms are both weak, denies fevers. I did review discharge summaries and notations when patient was in inpatient status and it looks like patient was supposed to go to a nursing home however there was an issue with insurance and she was discharged home. Patient has difficulty even moving her legs, she is morbidly obese with multiple medical problems, she is here for further evaluation. Location:-  Quality:-  Severity:-  Duration:-  Modifying factors:-    Nursing Notes were reviewed     REVIEW OF SYSTEMS:     Review of Systems  All systems, atotal of 10, are reviewed and negative except for those that were just noted in history present illness.         PAST MEDICAL HISTORY:     Past Medical History:   Diagnosis Date    Acute blood loss anemia 8/5/2020    Acute kidney injury ([address] 12/25/2019    Acute kidney injury superimposed on CKD ([address] 8/5/2020    Acute on chronic combined systolic and diastolic congestive heart failure ([address] 1/10/2020    Acute on Units. Please double check dosages. QUEtiapine (SEROQUEL) 25 MG tablet Take 1 tablet by mouth nightly  Qty: 30 tablet, Refills: 0      gabapentin (NEURONTIN) 300 MG capsule Take 300 mg by mouth 3 times daily. Blood Glucose Monitoring Suppl (ONE TOUCH ULTRA 2) w/Device KIT USE TO MONITOR BLOOD GLUCOSE LEVELS  Qty: 1 kit, Refills: 0    Associated Diagnoses: Type 2 diabetes mellitus with diabetic polyneuropathy, with long-term current use of insulin ([facility])      ONE TOUCH ULTRASOFT LANCETS MISC USE TO TEST BLOOD GLUCOSE LEVELS 4 TIMES DAILY  Qty: 150 each, Refills: 3    Associated Diagnoses: Type 2 diabetes mellitus with diabetic polyneuropathy, with long-term current use of insulin ([address]      ! ! blood glucose test strips (ASCENSIA AUTODISC VI;ONE TOUCH ULTRA TEST VI) strip USE TO MONITOR BLOOD GLUCOSE LEVELS 4 TIMES DAILY  Qty: 150 each, Refills: 3    Associated Diagnoses: Type 2 diabetes mellitus with diabetic polyneuropathy, with long-term current use of insulin ([facility])      budesonide-formoterol (SYMBICORT) 160-4.5 MCG/ACT AERO Inhale 2 puffs into the lungs 2 times daily  Qty: 1 Inhaler, Refills: 5      nitroGLYCERIN (NITROSTAT) 0.4 MG SL tablet Place 1 tablet under the tongue every 5 minutes as needed for Chest pain up to max of 3 total doses. If no relief after 1 dose, call 911. Qty: 25 tablet, Refills: 0      Insulin Pen Needle (B-D ULTRAFINE III SHORT PEN) 31G X 8 MM MISC Five shots a day  Qty: 200 each, Refills: 5    Associated Diagnoses: Diabetes mellitus type 2 in obese ([facility])      atorvastatin (LIPITOR) 80 MG tablet TAKE 1 TABLET BY MOUTH EVERY DAY AT NIGHT  Qty: 90 tablet, Refills: 3    Comments: DX Code Needed  .   Associated Diagnoses: Mixed hyperlipidemia; Coronary artery disease involving native coronary artery of native heart with angina pectoris ([facility]); S/P CABG (coronary artery bypass graft)      !! blood glucose test strips (FREESTYLE LITE) strip USE TO CHECK BLOOD GLUCOSE LEVELS FOUR TIMES DAILY  Qty: 200 strip, Refills: 10    Associated Diagnoses: Diabetes mellitus type 2 in obese ([facility])      tiotropium (SPIRIVA RESPIMAT) 2.5 MCG/ACT AERS inhaler INHALE 2 PUFFS INTO THE LUNGS DAILY  Qty: 1 Inhaler, Refills: 6      benztropine (COGENTIN) 1 MG tablet Take 1 mg by mouth nightly       INSULIN SYRINGE .5CC/29G 29G X 1/2\" 0.5 ML MISC 1 each by Does not apply route daily  Qty: 100 each, Refills: 3    Associated Diagnoses: Diabetes mellitus type 2 in obese ([facility])      pantoprazole (PROTONIX) 40 MG tablet Take 1 tablet by mouth 2 times daily  Qty: 60 tablet, Refills: 0      clopidogrel (PLAVIX) 75 MG tablet TAKE 1 TABLET BY MOUTH EVERY DAY  Qty: 30 tablet, Refills: 5      magnesium oxide (MAG-OX) 400 (240 Mg) MG tablet TAKE 1 TABLET ONCE DAILY  Qty: 30 tablet, Refills: 11      busPIRone (BUSPAR) 30 MG tablet Take 30 mg by mouth 2 times daily       aspirin EC 81 MG EC tablet Take 1 tablet by mouth daily  Qty: 30 tablet, Refills: 3      ARIPiprazole (ABILIFY) 15 MG tablet Take 15 mg by mouth daily       lamoTRIgine (LAMICTAL) 200 MG tablet Take 200 mg by mouth 2 times daily        ! ! - Potential duplicate medications found. Please discuss with provider.             ALLERGIES:    Lisinopril    FAMILY HISTORY:       Family History   Problem Relation Age of Onset    Heart Disease Maternal Grandmother     Cancer Mother         lung and brain cancer    Cancer Maternal Aunt         lung and brain cancer          SOCIAL HISTORY:       Social History     Socioeconomic History    Marital status: Single     Spouse name: None    Number of children: None    Years of education: None    Highest education level: None   Occupational History    None   Tobacco Use    Smoking status: Current Every Day Smoker     Packs/day: 1.00     Years: 30.00     Pack years: 30.00     Types: Cigarettes    Smokeless tobacco: Never Used   Vaping Use    Vaping Use: Never used   Substance and Sexual Activity    Alcohol use: No     Comment: Mucous membranes moist.  EYES: Conjunctiva non-injected, no lid abnormalities noted. No scleral icterus. PERRL. EOM's grossly intact. Anterior chambers clear. NECK: Supple. Normal ROM. No meningismus. No thyroid tenderness or swelling noted. CARDIOVASCULAR: RRR. No Murmer. PULMONARY/CHEST WALL: Effort normal. No tachypnea. Lungs clear to ausculation. ABDOMEN: Normal BS. Soft. Nondistended. Obese. No tenderness to palpate. No guarding. No hernias noted. No splenomegaly. Back: Spine is midline. No ecchymosis. No crepitus on palpation. No obvious subluxation of vertebral column. No saddle anesthesia or evidence of cauda equina. /ANORECTAL: Not assessed  MUSKULOSKELETAL: Normal ROM. No acute deformities. 3+ peripheral edema with bilateral dependent rubor and peripheral vascular venous stasis. No tenderness to palpate. SKIN: Warm and dry. NEUROLOGICAL:  GCS 15. Full tactile sensation noted all extremities  PSYCHIATRIC: Normal affect, normal insight and judgement. Alert andoriented x 3.         DIAGNOSTIC RESULTS:     LABS:    Results for orders placed or performed during the hospital encounter of 07/02/21   CBC auto differential   Result Value Ref Range    WBC 9.0 4.0 - 11.0 K/uL    RBC 4.30 4.00 - 5.20 M/uL    Hemoglobin 12.2 12.0 - 16.0 g/dL    Hematocrit 37.6 36.0 - 48.0 %    MCV 87.5 80.0 - 100.0 fL    MCH 28.4 26.0 - 34.0 pg    MCHC 32.5 31.0 - 36.0 g/dL    RDW 16.6 (H) 12.4 - 15.4 %    Platelets 703 734 - 015 K/uL    MPV 8.4 5.0 - 10.5 fL    Neutrophils % 86.3 %    Lymphocytes % 6.2 %    Monocytes % 6.3 %    Eosinophils % 0.5 %    Basophils % 0.7 %    Neutrophils Absolute 7.8 (H) 1.7 - 7.7 K/uL    Lymphocytes Absolute 0.6 (L) 1.0 - 5.1 K/uL    Monocytes Absolute 0.6 0.0 - 1.3 K/uL    Eosinophils Absolute 0.0 0.0 - 0.6 K/uL    Basophils Absolute 0.1 0.0 - 0.2 K/uL   Comprehensive metabolic panel   Result Value Ref Range    Sodium 131 (L) 136 - 145 mmol/L    Potassium 4.4 3.5 - 5.1 mmol/L    Chloride 90 (L) 99 - 110 mmol/L    CO2 27 21 - 32 mmol/L    Anion Gap 14 3 - 16    Glucose 172 (H) 70 - 99 mg/dL    BUN 73 (H) 7 - 20 mg/dL    CREATININE 2.4 (H) 0.6 - 1.1 mg/dL    GFR Non-African American 21 (A) >60    GFR  25 (A) >60    Calcium 9.3 8.3 - 10.6 mg/dL    Total Protein 7.6 6.4 - 8.2 g/dL    Albumin 4.2 3.4 - 5.0 g/dL    Albumin/Globulin Ratio 1.2 1.1 - 2.2    Total Bilirubin 1.3 (H) 0.0 - 1.0 mg/dL    Alkaline Phosphatase 281 (H) 40 - 129 U/L    ALT 25 10 - 40 U/L    AST 31 15 - 37 U/L    Globulin 3.4 g/dL   Troponin   Result Value Ref Range    Troponin 0.03 (H) <0.01 ng/mL   Urinalysis   Result Value Ref Range    Color, UA Yellow Straw/Yellow    Clarity, UA Clear Clear    Glucose, Ur Negative Negative mg/dL    Bilirubin Urine Negative Negative    Ketones, Urine Negative Negative mg/dL    Specific Gravity, UA 1.010 1.005 - 1.030    Blood, Urine SMALL (A) Negative    pH, UA 6.0 5.0 - 8.0    Protein, UA Negative Negative mg/dL    Urobilinogen, Urine 1.0 <2.0 E.U./dL    Nitrite, Urine Negative Negative    Leukocyte Esterase, Urine Negative Negative    Microscopic Examination YES     Urine Type NotGiven    Lactic Acid, Plasma   Result Value Ref Range    Lactic Acid 2.5 (H) 0.4 - 2.0 mmol/L   Blood gas, venous   Result Value Ref Range    pH, Christopher 7.393 7.350 - 7.450    pCO2, Christopher 43.7 40.0 - 50.0 mmHg    pO2, Christopher 80.8 (H) 25 - 40 mmHg    HCO3, Venous 26.0 23.0 - 29.0 mmol/L    Base Excess, Christopher 0.8 -3.0 - 3.0 mmol/L    O2 Sat, Christopher 96 Not Established %    Carboxyhemoglobin 4.7 (H) 0.0 - 1.5 %    MetHgb, Christopher 0.5 <1.5 %    TC02 (Calc), Christopher 27 Not Established mmol/L    O2 Therapy Unknown    Microscopic Urinalysis   Result Value Ref Range    WBC, UA 0-2 0 - 5 /HPF    RBC, UA 3-4 0 - 4 /HPF    Epithelial Cells, UA 0-1 0 - 5 /HPF    Bacteria, UA 2+ (A) None Seen /HPF   EKG 12 Lead   Result Value Ref Range    Ventricular Rate 114 BPM    Atrial Rate 114 BPM    P-R Interval 184 ms    QRS Duration 120 ms    Q-T patient's inability to walk I do feel the patient benefit from admission the hospital.  Patient does have multiple lab abnormalities, these do appear to be chronic in nature. Patient admitted to the hospital in stable condition, patient likely will need social work consultation as well as PT OT evaluation. Patient laboratory studies, radiographic imaging, andassessment were all discussed with the patient and/or patient family. There was shared decision-making between myself, the attending physician, as well as the patient and/or their surrogate and we are all in agreement with admission. There was an opportunity for questions and all questions were answered to the best of my ability and to the satisfaction of the patient and/or patient family. FINAL IMPRESSION:      1. Generalized weakness          DISPOSITION/PLAN:   DISPOSITIONAdmitted      PATIENT REFERRED TO:  No follow-up provider specified.     DISCHARGE MEDICATIONS:  Current Discharge Medication List                     (Please note that portions of this note were completed with a voice recognition program.  Efforts were made to edit the dictations, but occasionally words are mis-transcribed.)    TRAY Mendosa CNP-C (electronically signed)        TRAY Mendosa CNP  07/03/21 0103

## 2021-07-03 NOTE — PROGRESS NOTES
NURSING ASSESSMENT: [address]    Patient:Poppy Bautista     Rehab Dx/Hx: Generalized weakness [R53.1]   :1965  CWV:[id number]  Date of Admit: 2021  Room #: 4700/5667-73    Subjective:   Patient admitted to room 353 from ER via Stretcher. Alert and oriented x4. Oriented to room and call light system. Oriented to rehab routine and therapy schedules. Informed about care conferences and ordering of meals with PCA. Drug / Medication Review:   Medications were reviewed by RN at time of admission  [x]  No potential or actual clinically significant medication issues were noted. []  Potential or actual clinically significant medication issues were found and MD was notified. 4 Eyes Skin Assessment   The patient is being assessed for: Admission     I agree that 2 RN's have performed a thorough Head to Toe Skin Assessment on the patient. ALL assessment sites listed below have been assessed. Areas assessed by both nurses:   [x]   Head, Face, and Ears   [x]   Shoulders, Back, and Chest, Abdomen  [x]   Arms, Elbows, and Hands   [x]   Coccyx, Sacrum, and Ischium  [x]   Legs, Feet, and Heel     Does the Patient have Skin Breakdown?   Yes         Vivek Prevention initiated:  Yes   Wound Care Orders initiated:   Not Applicable      [facility] nurse consulted for Pressure Injury (Stage 3,4, Unstageable, DTI, NWPT, Complex wounds)and New or Established Ostomies:  Not Applicable    Primary Nurse eSignature: Pradeep Lubin RN  Co-signer eSignature: Woo Kamara RN

## 2021-07-03 NOTE — PROGRESS NOTES
Pt. admitted in Room 353 from ED per stretcher with no incident. Acclimated to room, VSS, bed locked and in lowest position with 2/4 siderails up. Call light and bedside table within pt's reach.

## 2021-07-03 NOTE — CARE COORDINATION
CASE MANAGEMENT INITIAL ASSESSMENT      Reviewed chart and completed assessment via telephone with: pt  Explained Case Management role/services. Primary contact information:    Health Care Decision Maker :   Primary Decision Maker: Eve Vazquez \"Lanny\" - Child - [phone]        Can this person be reached and be able to respond quickly, such as within a few minutes or hours? Yes    Admit date/status: 7/3 IP  Diagnosis: inability to walk  Is this a Readmission?:  Yes      Insurance:[facility] Medicare   Precert required for SNF: Yes       3 night stay required: No    Living arrangements, Adls, care needs, prior to admission: friend lydia HAQUE     Transportation: tbd    [address] at home:  Walker_x_Cane_x_RTS__ BSC__Shower Chair_x_  02__ HHN__ CPAP__  BiPap__  Hospital Bed__ W/C___ Other__________    Services in the home and/or outpatient, prior to admission: referred to [facility] no SOC before readmission    Dialysis Facility (if applicable) N/A    PT/OT recs: none available    Hospital Exemption Notification (HEN): not initiated    Barriers to discharge: none    Plan/comments: Pt discharged from [facility] 7/1 with cert to [facility] in and decided to return home with [facility] instead. Found herself unable to walk/get off floor after sliding to ground from couch on 7/2. States now that she is willing to discharge to SNF if rec'd and requests referral to [facility] again. Called and left  with graham re:status of previous cert but likely will need PT/OT eval here and new cert. Referral placed in Epic in anticipation. Requested orders and placed. DCP following.      ECOC on chart for MD signature

## 2021-07-03 NOTE — ED NOTES
Orthos attempted. BP and HR as charted for lying and sitting. Pt unable to stand up at the side of the bed with assistx2. Unable to obtain standing BP and HR.  Notified MD. Sammie Broussard RN  07/02/21 2032

## 2021-07-03 NOTE — H&P
Hospital Medicine History & Physical      PCP: Elizabeth Berger PA-C    Date of Admission: 7/2/2021    Date of Service: Pt seen/examined on 7/2/21 and Admitted to Inpatient with expected LOS greater than two midnights due to medical therapy. Chief Complaint: Generalized weakness, unable to ambulate      History Of Present Illness:      60 y.o. female with multiple medical problems including but not limited to chronic combined systolic and diastolic heart failure EF 25%, DM type II, CAD status post CABG, COPD presented to emergency room with generalized weakness. .. The patient had just been discharged from [place] [address], admitted from 6/21/7/21 with acute decompensated combined systolic and diastolic heart failure complicated by hyponatremia, SHAUNNA, physical deconditioning. . Managed with Lasix drip. .. SNF was recommended at discharge but she declined. .. When she went home, she realized she was too weak to ambulate and called EMS that brought her to [facility]. Belinda Pretty On presentation, vital signs are stable. Taty Mata was clear. .  The patient is now agreeable to going to a skilled nursing facility for rehab    Past Medical History:          Diagnosis Date    Acute blood loss anemia 8/5/2020    Acute kidney injury ([address] 12/25/2019    Acute kidney injury superimposed on CKD ([address] 8/5/2020    Acute on chronic combined systolic and diastolic congestive heart failure ([address] 1/10/2020    Acute on chronic right-sided heart failure ([address] 08/2020    Alcohol dependence ([address] 3/10/2010    Arthritis     Asthma     CAD (coronary artery disease)     Cellulitis 6/3/2020    COPD (chronic obstructive pulmonary disease) ([address]     Diabetic ulcer of left foot associated with type 2 diabetes mellitus, limited to breakdown of skin ([address] 1/18/2020    Diabetic ulcer of toe of right foot associated with type 2 diabetes mellitus ([address] 1/18/2020    Left renal artery stenosis ([address] 08/2020    MI (myocardial infarction) mesenteric angiogram (diagnostic only)    VASCULAR SURGERY Bilateral 07/07/2020    diagnostic lower extremity angiogram only    VASCULAR SURGERY Left 08/04/2020    angioplasty and stent of renal artery    VASCULAR SURGERY Left 08/05/2020    coil embolization of branch renal artery vessel for bleeding    VASCULAR SURGERY Left 09/01/2020    angioplasty and stenting of subclavian artery       Medications Prior to Admission:      Prior to Admission medications    Medication Sig Start Date End Date Taking? Authorizing Provider   apixaban (ELIQUIS) 2.5 MG TABS tablet Take 2 tablets by mouth 2 times daily 7/1/21   Hayden Mendez MD   Insulin Degludec (TRESIBA FLEXTOUCH) 100 UNIT/ML SOPN Inject 3 Units into the skin nightly 7/1/21   Hayden Mendez MD   hydrALAZINE (APRESOLINE) 10 MG tablet Take 1 tablet by mouth every 8 hours 7/1/21   Hayden Mendez MD   metoprolol tartrate (LOPRESSOR) 50 MG tablet Take 1 tablet by mouth 2 times daily 7/1/21   Hayden Mendez MD   bumetanide (BUMEX) 2 MG tablet Take 1 tablet by mouth 2 times daily 7/1/21   Hayden Mendez MD   tolvaptan (SAMSCA) 15 MG TABS tablet Take 1 tab Monday, Wednesday and Friday 7/1/21   Hayden Mendez MD   Cholecalciferol (VITAMIN D) 25 MCG TABS Take 1 tablet by mouth daily 7/1/21   Hayden Mendez MD   QUEtiapine (SEROQUEL) 25 MG tablet Take 1 tablet by mouth nightly 7/1/21 7/31/21  Hayden Mendez MD   gabapentin (NEURONTIN) 300 MG capsule Take 300 mg by mouth 3 times daily.     Historical Provider, MD   Blood Glucose Monitoring Suppl (ONE TOUCH ULTRA 2) w/Device KIT USE TO MONITOR BLOOD GLUCOSE LEVELS 5/26/21   TRAY Rosario CNP   ONE TOUCH ULTRASOFT LANCETS MISC USE TO TEST BLOOD GLUCOSE LEVELS 4 TIMES DAILY 5/26/21   TRAY Rosario CNP   blood glucose test strips (ASCENSIA AUTODISC VI;ONE TOUCH ULTRA TEST VI) strip USE TO MONITOR BLOOD GLUCOSE LEVELS 4 TIMES DAILY 5/26/21   TRAY Rosario CNP   budesonide-formoterol [facility]) 160-4.5 MCG/ACT AERO Inhale 2 puffs into the lungs 2 times daily 4/19/21   Britney Gonzalez MD   nitroGLYCERIN (NITROSTAT) 0.4 MG SL tablet Place 1 tablet under the tongue every 5 minutes as needed for Chest pain up to max of 3 total doses. If no relief after 1 dose, call 911. 2/23/21   TRAY Covington CNP   Insulin Pen Needle (B-D ULTRAFINE III SHORT PEN) 31G X 8 MM MISC Five shots a day 2/1/21   TRAY Simmons CNP   atorvastatin (LIPITOR) 80 MG tablet TAKE 1 TABLET BY MOUTH EVERY DAY AT NIGHT 1/11/21   TRAY Covington CNP   blood glucose test strips (FREESTYLE LITE) strip USE TO CHECK BLOOD GLUCOSE LEVELS FOUR TIMES DAILY 11/17/20   Kelli Rueda MD   tiotropium (SPIRIVA RESPIMAT) 2.5 MCG/ACT AERS inhaler INHALE 2 PUFFS INTO THE LUNGS DAILY 10/13/20   Britney Gonzalez MD   benztropine (COGENTIN) 1 MG tablet Take 1 mg by mouth nightly  12/13/19   Historical Provider, MD   INSULIN SYRINGE .5CC/29G 29G X 1/2\" 0.5 ML MISC 1 each by Does not apply route daily 12/3/19   TRAY Simmons CNP   pantoprazole (PROTONIX) 40 MG tablet Take 1 tablet by mouth 2 times daily 10/1/19   Ronan Baker MD   clopidogrel (PLAVIX) 75 MG tablet TAKE 1 TABLET BY MOUTH EVERY DAY 9/3/19   TRAY Covington CNP   magnesium oxide (MAG-OX) 400 (240 Mg) MG tablet TAKE 1 TABLET ONCE DAILY 5/1/19   Jaun Boyer MD   busPIRone (BUSPAR) 30 MG tablet Take 30 mg by mouth 2 times daily  4/2/18   Historical Provider, MD   aspirin EC 81 MG EC tablet Take 1 tablet by mouth daily 1/8/16   Jeremi Argueta MD   ARIPiprazole (ABILIFY) 15 MG tablet Take 15 mg by mouth daily     Historical Provider, MD   lamoTRIgine (LAMICTAL) 200 MG tablet Take 200 mg by mouth 2 times daily     Historical Provider, MD       Allergies:  Lisinopril    Social History:      The patient currently lives     TOBACCO:   reports that she has been smoking cigarettes. She has a 30.00 pack-year smoking history.  She has never used smokeless tobacco.  ETOH:   reports no history of alcohol use. Family History:       Reviewed in detail and negative for DM, CAD, Cancer, CVA. Positive as follows:        Problem Relation Age of Onset    Heart Disease Maternal Grandmother     Cancer Mother         lung and brain cancer    Cancer Maternal Aunt         lung and brain cancer       REVIEW OF SYSTEMS:   Pertinent positives as noted in the HPI. All other systems reviewed and negative. PHYSICAL EXAM:    /72   Pulse 93   Temp 97.4 °F (36.3 °C)   Resp 16   Ht 5' 4\" (1.626 m)   Wt 267 lb 6.7 oz (121.3 kg)   LMP 10/24/2012   SpO2 98%   BMI 45.90 kg/m²     General appearance:  No apparent distress, appears stated age and cooperative. HEENT:  Normal cephalic, atraumatic without obvious deformity. Pupils equal, round, and reactive to light. Extra ocular muscles intact. Conjunctivae/corneas clear. Neck: Supple, with full range of motion. No jugular venous distention. Trachea midline. Respiratory:  Normal respiratory effort. Clear to auscultation, bilaterally without Rales/Wheezes/Rhonchi. Cardiovascular:  Regular rate and rhythm with normal S1/S2 without murmurs, rubs or gallops. Abdomen: Soft, non-tender, non-distended with normal bowel sounds. Musculoskeletal:  No clubbing, cyanosis . Ángela Boscobel Chronic venous stasis changes  Neurologic:  Neurovascularly intact without any focal sensory/motor deficits.  Cranial nerves: II-XII intact, grossly non-focal.  Psychiatric:  Alert and oriented, thought content appropriate, normal insight  Capillary Refill: Brisk,< 3 seconds   Peripheral Pulses: +2 palpable, equal bilaterally       CXR:  I have reviewed the CXR with the following interpretation:   EKG:  I have reviewed the EKG with the following interpretation:     Labs:     Recent Labs     07/01/21 0757 07/02/21  1830   WBC 7.8 9.0   HGB 12.3 12.2   HCT 36.6 37.6    247     Recent Labs     06/30/21 2059 07/01/21  0757 07/02/21  1830   * 127* 131*   K 3.9 3.4* 4.4   CL 84* 84* 90*   CO2 25 28 27   BUN 74* 77* 73*   CREATININE 2.7* 2.4* 2.4*   CALCIUM 9.5 9.5 9.3     Recent Labs     07/02/21  1830   AST 31   ALT 25   BILITOT 1.3*   ALKPHOS 281*     No results for input(s): INR in the last 72 hours. Recent Labs     07/02/21  1830   TROPONINI 0.03*       Urinalysis:      Lab Results   Component Value Date    NITRU Negative 07/02/2021    WBCUA 0-2 07/02/2021    BACTERIA 2+ 07/02/2021    RBCUA 3-4 07/02/2021    BLOODU SMALL 07/02/2021    SPECGRAV 1.010 07/02/2021    GLUCOSEU Negative 07/02/2021         ASSESSMENT:    -Physical deconditioning/generalized weakness due to CHF--- needs SNF for rehab    -Chronic combined systolic and diastolic heart failure, EF 25% on echo 4/25-patient recently discharged from [facility] for [facility] decompensation--- continue Bumex, Aldactone, hydralazine    -Coronary artery disease status post CABG--stable--continue aspirin, Plavix, statin, beta-blocker      -Hyponatremia -hypervolemic due to CHF. .. Sodium improved from recent admission--continue tolvaptan Monday Wednesday and Friday monitor sodium levels    -CKD stage IV with recent SHAUNNA--creatinine is currently stable-avoid nephrotoxic medications      -Obstructive sleep apnea-? CPAP use    -DM type II, insulin independent, controlled hemoglobin A1c 11.9 ---continue home regimen    -JSMP-sofkme-wqvrriwu inhaled steroids bronchodilators    -Obesity with BMI 45--- continue modified diet for weight loss    -Chronic venous insufficiency with venous ulcers    -Peripheral vascular disease status post prior angioplasty of multiple vessels--continue antiplatelets and statin      DVT Prophylaxis: Eliquis  Diet: ADULT DIET; Regular; 4 carb choices (60 gm/meal);  Low Fat/Low Chol/High Fiber/CHARANJIT; 1500 ml  Code Status: Full Code    PT/OT Eval Status: Ordered    Dispo -SNF once approved       Foreign Tomlinson MD    Thank you Bren Flores PA-C for the opportunity to be involved in this patient's care. If you have any questions or concerns please feel free to contact me at [phone].

## 2021-07-04 NOTE — PROGRESS NOTES
Patient assisted to chair with stedy by PT. Patient slipping out of chair with maxi move pad underneath her and PT recommended maxi move to assist patient back to bed. Patient tolerated well.

## 2021-07-04 NOTE — PLAN OF CARE
Pt remains free from falls during this shift. Pt a/o and calls out appropriately. Gets up with a heavy x2 assist using the stedy. Bed in lowest position and wheels locked. Bed alarm active for safety. Call light within reach. Bedside table within reach.     Problem: Falls - Risk of:  Goal: Will remain free from falls  Description: Will remain free from falls  Outcome: Ongoing  Goal: Absence of physical injury  Description: Absence of physical injury  Outcome: Ongoing

## 2021-07-04 NOTE — PROGRESS NOTES
Hospitalist Progress Note      PCP: Nick Storm PA-C    Date of Admission: 7/2/2021      Brief HPI    60 y.o. female with multiple medical problems including but not limited to chronic combined systolic and diastolic heart failure EF 25%, DM type II, CAD status post CABG, COPD presented to emergency room with generalized weakness. .. The patient had just been discharged from [facility], admitted from 6/21/7/21 with acute decompensated combined systolic and diastolic heart failure complicated by hyponatremia, SHAUNNA, physical deconditioning. . Managed with Lasix drip. .. SNF was recommended at discharge but she declined. .. When she went home, she realized she was too weak to ambulate and called EMS that brought her to [facility]. Jigar Cuello On presentation, vital signs are stable. Ginny Alexandra was clear. .  The patient is now agreeable to going to a skilled nursing facility for rehab    Subjective:     Patient remains extremely weak this morning, requiring maximum assist x2 for bed mobility and transfers. . No shortness of breath  No acute events overnight.     Medications:  Reviewed    Infusion Medications    dextrose      sodium chloride       Scheduled Medications    apixaban  5 mg Oral BID    aspirin EC  81 mg Oral Daily    atorvastatin  80 mg Oral Nightly    benztropine  1 mg Oral Nightly    budesonide-formoterol  2 puff Inhalation BID    bumetanide  2 mg Oral BID    busPIRone  30 mg Oral BID    clopidogrel  75 mg Oral Daily    gabapentin  300 mg Oral TID    hydrALAZINE  10 mg Oral 3 times per day    lamoTRIgine  200 mg Oral BID    magnesium oxide  400 mg Oral Daily    metoprolol tartrate  50 mg Oral BID    pantoprazole  40 mg Oral BID    QUEtiapine  25 mg Oral Nightly    tiotropium  2 puff Inhalation Daily    [START ON 7/5/2021] tolvaptan  15 mg Oral Q MWF    insulin glargine  0.15 Units/kg Subcutaneous Nightly    insulin lispro  0.05 Units/kg Subcutaneous TID WC    insulin lispro  0-6 Units Subcutaneous TID WC    insulin lispro  0-3 Units Subcutaneous Nightly     PRN Meds: glucose, dextrose, glucagon (rDNA), dextrose, sodium chloride flush, sodium chloride, ondansetron **OR** ondansetron, acetaminophen **OR** acetaminophen      Intake/Output Summary (Last 24 hours) at 7/4/2021 0846  Last data filed at 7/4/2021 0419  Gross per 24 hour   Intake 120 ml   Output 1300 ml   Net -1180 ml       Exam:    /65   Pulse 81   Temp 97.2 °F (36.2 °C) (Oral)   Resp 18   Ht 5' 4\" (1.626 m)   Wt 275 lb 12.7 oz (125.1 kg)   LMP 10/24/2012   SpO2 97%   BMI 47.34 kg/m²     General appearance: No apparent distress, appears stated age and cooperative. HEENT: Pupils equal, round, and reactive to light. Conjunctivae/corneas clear. Neck: Supple, with full range of motion. No jugular venous distention. Trachea midline. Respiratory:  Normal respiratory effort. Clear to auscultation, bilaterally without Rales/Wheezes/Rhonchi. Cardiovascular: Regular rate and rhythm with normal S1/S2 without murmurs, rubs or gallops. Abdomen: Soft, non-tender, non-distended with normal bowel sounds. Musculoskeletal: No clubbing, cyanosis or edema bilaterally. Full range of motion without deformity. Skin: Skin color, texture, turgor normal.  No rashes or lesions. Neurologic:  Neurovascularly intact without any focal sensory/motor deficits. Cranial nerves: II-XII intact, grossly non-focal.  Psychiatric: Alert and oriented, thought content appropriate, normal insight  Capillary Refill: Brisk,< 3 seconds   Peripheral Pulses: +2 palpable, equal bilaterally       Labs:   Recent Labs     07/02/21  1830   WBC 9.0   HGB 12.2   HCT 37.6        Recent Labs     07/02/21  1830   *   K 4.4   CL 90*   CO2 27   BUN 73*   CREATININE 2.4*   CALCIUM 9.3     Recent Labs     07/02/21  1830   AST 31   ALT 25   BILITOT 1.3*   ALKPHOS 281*     No results for input(s): INR in the last 72 hours.   Recent Labs     07/02/21 1830

## 2021-07-04 NOTE — PROGRESS NOTES
Occupational Therapy   Occupational Therapy Initial Assessment and Treatment Note  Date: 2021   Patient Name: Kasia Bradford  MRN: [id number]     : 1965    Date of Service: 2021    Discharge Recommendations:  [address]  OT Equipment Recommendations  Equipment Needed: No  Other: defer    Assessment   Performance deficits / Impairments: Decreased functional mobility ; Decreased ADL status; Decreased strength;Decreased safe awareness;Decreased cognition;Decreased balance;Decreased sensation;Decreased posture;Decreased endurance  Assessment: OT eval complete. Pt presents with the above deficits impacting independence with ADL, t/fs and mobility. Pt is a 59 yo female admitting with generalized weakness after not being able to ambulate at home. Pt required Max A x2 bed mobility and t/fs from elevated bed with stedy. Pt left seated in chair with betty sling under pt d/t inability to stand from lower chair with Max A x2 assist. Pt dependent LB ADLs including toileting. Recommend DC SNF to ensure safe return to PLOF, pt agreeable to DC plan. Cont POC  Prognosis: Fair  Decision Making: Medium Complexity  OT Education: OT Role;Plan of Care;ADL Adaptive Strategies;Transfer Training;Energy Conservation;Equipment  Patient Education: benefits of OOB and increased activity  Barriers to Learning: pain  REQUIRES OT FOLLOW UP: Yes  Activity Tolerance  Activity Tolerance: Patient Tolerated treatment well;Patient limited by fatigue  Safety Devices  Safety Devices in place: Yes  Type of devices: All fall risk precautions in place;Call light within reach; Chair alarm in place;Gait belt;Patient at risk for falls; Left in chair;Nurse notified  Restraints  Initially in place: No           Patient Diagnosis(es): The encounter diagnosis was Generalized weakness.      has a past medical history of Acute blood loss anemia, Acute kidney injury ([address] Acute kidney injury superimposed on CKD ([address] Acute on Yes  Patient assessed for rehabilitation services?: Yes  Family / Caregiver Present: No  Diagnosis: generalized weakness  Subjective  Subjective: Pt supine in bed upon arrival and agreeable to session  General Comment  Comments: RN approval prior to session stating last night it took three people to assist pt off toilet with stedy and a staff memeber got hurt d/t pt going dependent during t/f  Vital Signs  Temp: 98 °F (36.7 °C)  Temp Source: Axillary  Pulse: 85  Heart Rate Source: Monitor  Resp: 18  BP: 100/67  BP Location: Left lower arm  Patient Position: Semi fowlers  Oxygen Therapy  SpO2: 97 %  O2 Device: None (Room air)  Social/Functional History  Social/Functional History  Lives With: Friend(s)  Type of Home: Trailer  Home Layout: One level  Home Access: Stairs to enter with rails  Entrance Stairs - Number of Steps: 5  Entrance Stairs - Rails: Both  Bathroom Shower/Tub: Tub/Shower unit, Shower chair with back  Bathroom Toilet: Standard  Bathroom Equipment: Shower chair  Bathroom Accessibility: Walker accessible  Home Equipment: Rolling walker, 4 wheeled walker, Cane  ADL Assistance: Independent  Homemaking Responsibilities: No  Ambulation Assistance: Independent  Transfer Assistance: Independent  Active : Yes  Mode of Transportation: Car  Occupation: On disability  Additional Comments: Pt reports x2 falls in the past 6 months no injury,  pt reports she drives and run errands such as taking dtr to/from work, pt reports she uses RW in home as needed       Objective   Vision: Impaired  Vision Exceptions: Wears glasses for reading  Hearing: Within functional limits    Orientation  Overall Orientation Status: Within Functional Limits     Balance  Sitting Balance: Contact guard assistance (EOB)  Standing Balance: Dependent/Total (Max A x2 stedy)  Standing Balance  Time: x15 sec, x30 sec  Activity: static standing, ADLs  Comment: Max A x2 stedy  ADL  Grooming: Setup (washing face seated in chair)  UE Dressing: Minimal assistance (doff/jimbo gown seated in chair)  LE Dressing: Dependent/Total (socks)  Toileting: Dependent/Total (toileting standing at stedy)  Tone RUE  RUE Tone: Normotonic  Tone LUE  LUE Tone: Normotonic  Coordination  Movements Are Fluid And Coordinated: Yes     Bed mobility  Supine to Sit: 2 Person assistance;Maximum assistance  Sit to Supine: 2 Person assistance;Maximum assistance  Scooting: Dependent/Total;2 Person assistance  Transfers  Sit to stand: 2 Person assistance;Maximum assistance  Stand to sit: Maximum assistance;2 Person assistance  Transfer Comments: stedy     Cognition  Overall Cognitive Status: Exceptions  Arousal/Alertness: Appropriate responses to stimuli  Following Commands:  Follows one step commands consistently  Attention Span: Appears intact  Memory: Appears intact  Safety Judgement: Decreased awareness of need for assistance  Problem Solving: Assistance required to generate solutions  Insights: Decreased awareness of deficits  Initiation: Requires cues for some  Sequencing: Requires cues for some        Sensation  Overall Sensation Status: Impaired (pt reporting constant neuropathy in feet)        LUE AROM (degrees)  LUE AROM : WFL  RUE AROM (degrees)  RUE AROM : WFL  RUE General AROM: R shoulder flexion 90 degrees d/t reported rotator cuff tear  LUE Strength  Gross LUE Strength: Exceptions to WFL (functionally weak)  RUE Strength  Gross RUE Strength: Exceptions to WFL (functionally weak)     Hand Dominance  Hand Dominance: Right             Plan   Plan  Times per week: 3-5x/wk  Current Treatment Recommendations: Strengthening, Patient/Caregiver Education & Training, Equipment Evaluation, Education, & procurement, Balance Training, Functional Mobility Training, Endurance Training, Pain Management, Cognitive/Perceptual Training, Safety Education & Training, Self-Care / ADL      AM-PAC Score        AM-[facility] Inpatient Daily Activity Raw Score: 14 (07/04/21 1211)  AM-PAC Inpatient ADL T-Scale Score : 33.39 (07/04/21 1211)  ADL Inpatient CMS 0-100% Score: 59.67 (07/04/21 1211)  ADL Inpatient CMS G-Code Modifier : CK (07/04/21 1211)    Goals  Short term goals  Time Frame for Short term goals: 1-2 weeks  Short term goal 1: Pt will complete toilet t/f with max A x1. Short term goal 2: Pt will complete UB ADLs with S/U. Short term goal 3: Pt will tolerate standing x3 mins to increase participation in ADLs. Short term goal 4: Pt will complete x3 sets x15 reps BU E therex to increase strength.   Patient Goals   Patient goals : get stronger, walk       Therapy Time   Individual Concurrent Group Co-treatment   Time In 0930         Time Out 1006         Minutes 36         Timed Code Treatment Minutes: 26 Minutes (10 min eval)       MIGUEL Maki, OTR/L at home:

## 2021-07-04 NOTE — PROGRESS NOTES
Pt assessment completed and charted. VSS. Pt a/o. Bed check active for safety. Encouraged pt to reposition. Educated pt on fluid restriction and carb control diet. Bed in lowest position and wheels locked. Call light within reach. Bedside table within reach. Non-skid footwear in place. Pt denies any other needs at this time. Pt calls out appropriately. Will continue to monitor.

## 2021-07-04 NOTE — PLAN OF CARE
Problem: Falls - Risk of:  Goal: Will remain free from falls  Description: Will remain free from falls  7/4/2021 1303 by Erick Botello RN  Outcome: Ongoing  7/4/2021 0012 by Gallito Cabral RN  Outcome: Ongoing  Goal: Absence of physical injury  Description: Absence of physical injury  7/4/2021 1303 by Erick Botello RN  Outcome: Ongoing  7/4/2021 0012 by Gallito Cabral RN  Outcome: Ongoing

## 2021-07-04 NOTE — PROGRESS NOTES
Physical Therapy    Facility/Department: [facility] B3 - MED SURG  Initial Assessment/Treatment    NAME: Alexandre Joshi  : 1965  MRN: [id number]    Date of Service: 2021    Discharge Recommendations:  Subacute/Skilled Nursing Facility   PT Equipment Recommendations  Equipment Needed: No  Other: Defer to next level of care    Assessment   Body structures, Functions, Activity limitations: Decreased functional mobility ; Decreased sensation; Increased pain;Decreased balance;Decreased ROM; Decreased strength;Decreased safe awareness;Decreased endurance  Assessment: Pt is a 60 y.o. female admitted to [facility] secondary to weakness. Pt recently d/c from [facility] d/t [facility]. Pt reports she lives with a friend in a trailer with 5 [address]. Pt reports she is typically I with functional mobility and gait with use of 4WW PRN. Pt is currently functioning well below her baseline requiring maxA x 2 for bed mobility, maxA x 2/TD with use of STEDY or maxi-move for bed <> chair t/f. Pt is limited by deconditioning, decreased strength, decreased balance and increased pain. Pt will benefit from continued skilled PT in acute care setting to address above deficits. D/t pts current deficits and higher PLOF recommend SNF at d/c. Treatment Diagnosis: Impaired functional mobility and balance  Specific instructions for Next Treatment: Progress mobility as tolerated  Prognosis: Good  Decision Making: Medium Complexity  PT Education: Goals; General Safety;PT Role;Disease Specific Education;Plan of Care; Functional Mobility Training;Pressure Relief;Precautions; Injury Prevention;Transfer Training;Energy Conservation;Equipment  Patient Education: Importance of OOB mobility and t/f, being upright for meals, safe use of AD and equipment. Pt verbalized understanding  Barriers to Learning: none  REQUIRES PT FOLLOW UP: Yes  Activity Tolerance  Activity Tolerance: Patient limited by fatigue;Patient limited by pain; Patient limited by endurance  Activity Tolerance: /67, HR 85, SpO2 97% on RA       Patient Diagnosis(es): The encounter diagnosis was Generalized weakness. has a past medical history of Acute blood loss anemia, Acute kidney injury ([address] Acute kidney injury superimposed on CKD ([address] Acute on chronic combined systolic and diastolic congestive heart failure ([address] Acute on chronic right-sided heart failure ([address] Alcohol dependence ([address] Arthritis, Asthma, CAD (coronary artery disease), Cellulitis, COPD (chronic obstructive pulmonary disease) ([address] Diabetic ulcer of left foot associated with type 2 diabetes mellitus, limited to breakdown of skin ([address] Diabetic ulcer of toe of right foot associated with type 2 diabetes mellitus ([address] Left renal artery stenosis ([address] MI (myocardial infarction) ([address] Positive FIT (fecal immunochemical test), 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 has a past surgical history that includes Tonsillectomy; Cholecystectomy; tumor excision; Upper gastrointestinal endoscopy (4/25/2013); Upper gastrointestinal endoscopy (12/10/2015); Upper gastrointestinal endoscopy (01/06/2017); Arterial bypass surgry (Left, 01/06/2016); Cardiac catheterization (03/27/2018); Coronary angioplasty with stent (03/16/2018); Rotator cuff repair (Left, 04/22/2016); Coronary angioplasty with stent (01/03/2018); Insertable Cardiac Monitor (02/14/2019); femoral bypass (Right, 5/16/2019); transluminal angioplasty (Left, 10/08/2019); Cardiac catheterization (01/20/2020); Coronary artery bypass graft (N/A, 1/27/2020); vascular surgery (Left, 12/08/2015); transluminal angioplasty (Left, 04/29/2020); vascular surgery (Bilateral, 07/07/2020);  Coronary angioplasty with stent (10/07/2019); transesophageal echocardiogram (01/26/2020); vascular surgery (Left, 08/04/2020); vascular surgery (Left, 08/05/2020); and vascular surgery (Left, 09/01/2020). Restrictions  Restrictions/Precautions  Restrictions/Precautions: General Precautions, Fall Risk, Up as Tolerated  Required Braces or Orthoses?: No  Position Activity Restriction  Other position/activity restrictions: rhonda roche  Vision/Hearing  Vision: Impaired  Vision Exceptions: Wears glasses for reading  Hearing: Within functional limits     Subjective  General  Chart Reviewed: Yes  Patient assessed for rehabilitation services?: Yes  Additional Pertinent Hx: Per Dr. Rose Butler H&P \"62 y.o. female with multiple medical problems including but not limited to chronic combined systolic and diastolic heart failure EF 25%, DM type II, CAD status post CABG, COPD presented to emergency room with generalized weakness\"  Response To Previous Treatment: Not applicable  Family / Caregiver Present: No  Referring Practitioner: Jamie Rogers MD  Referral Date : 07/04/21  Diagnosis: Weakness  Follows Commands: Within Functional Limits  General Comment  Comments: RN cleared pt for session  Subjective  Subjective: Pt supine in bed on approach, agreeable to PT tx  Pain Screening  Patient Currently in Pain: Yes  Pain Assessment  Pain Assessment: 0-10  Pain Level: 6  Pain Type: Acute pain  Pain Location: Back;Leg  Pain Orientation: Right;Left;Lower  Pain Descriptors: Aching; Sore  Non-Pharmaceutical Pain Intervention(s): Ambulation/Increased Activity;Repositioned; Therapeutic presence;Distraction  Vital Signs  Patient Currently in Pain: Yes  Pre Treatment Pain Screening  Intervention List: Patient able to continue with treatment    Orientation  Orientation  Overall Orientation Status: Within Functional Limits  Social/Functional History  Social/Functional History  Lives With: Friend(s)  Type of Home: Trailer  Home Layout: One level  Home Access: Stairs to enter with rails  Entrance Stairs - Number of Steps: 5  Entrance Stairs - Rails: Both  Bathroom Shower/Tub: Tub/Shower unit, Shower chair with back  [employer]: Standard  Bathroom Equipment: Shower chair  Bathroom Accessibility: Walker accessible  Home Equipment: Rolling walker, 4 wheeled walker, Cane  ADL Assistance: Independent  Homemaking Responsibilities: No  Ambulation Assistance: Independent (With 4WW vs no AD)  Transfer Assistance: Independent  Active : Yes  Mode of Transportation: Car  Occupation: On disability  Additional Comments: Pt reports x2 falls in the past 6 months no injury,  pt reports she drives and run errands such as taking dtr to/from work, pt reports she uses RW in home as needed    Objective     Observation/Palpation  Posture: Fair  Edema: Weeping edema noted to BLE (LLE >RLE)    AROM RLE (degrees)  RLE General AROM: hip and knee decreased approx 50% secondary to pain, weakness and body habitus, ankle WFL  AROM LLE (degrees)  LLE General AROM: hip and knee decreased approx  25-50% secondary to weakness and body habitus, ankle WFL     Strength RLE  Comment: Hip grossly 3-5, knee grossly 3-/5 and ankle grossly 3/5  Strength LLE  Comment: Hip grossly 3-5, knee and ankle grossly 3/5     Tone RLE  RLE Tone: Normotonic  Tone LLE  LLE Tone: Normotonic  Motor Control  Gross Motor?: WFL     Sensation  Overall Sensation Status: Impaired (Baseline neuropathy to B feet)     Bed mobility  Supine to Sit: 2 Person assistance;Maximum assistance (HOB elevated, use of BR, increased time with cues for sequence)  Sit to Supine: Dependent/Total (use of maxi move)  Scooting: Dependent/Total;2 Person assistance     Transfers  Sit to Stand: Dependent/Total;2 Person Assistance  Stand to sit: Dependent/Total;2 Person Assistance  Bed to Chair: Dependent/Total;2 Person Assistance  Comment: Slightly elevated EOB to chair with STEDY maxA x 2, cues for hand placement, limited by reports of pain with increased L knee flexion.  Assisted RN with maxi-move for back to bed later in am with TD for safety     Ambulation  Ambulation?: No (unsafe to attempt, maxA x 2 standing with STEDY)  Stairs/Curb  Stairs?: No (Unsafe to attempt)        Balance  Posture: Fair  Sitting - Static: Fair  Sitting - Dynamic: Fair;-  Standing - Static: Poor  Standing - Dynamic: Poor  Comments: Pt requires Diana to SBA sitting EOB with UUE support. Pt requires modA progressing to maxA x 2 for static standing in STEDY x 15 seconds + x 45 seconds. Limited by fatigue, pain and weakness, PT assisting with balance OT assisting with aaron-care in standing. Exercises  Gluteal Sets: Seated BLE x 10  Ankle Pumps: Seated BLE x 10  Comments: Cues for sequence/technique     Plan   Plan  Times per week: 3-5x/wk  Times per day: Daily  Specific instructions for Next Treatment: Progress mobility as tolerated  Current Treatment Recommendations: Strengthening, Home Exercise Program, ROM, Safety Education & Training, Balance Training, Endurance Training, Patient/Caregiver Education & Training, Functional Mobility Training, Transfer Training, Gait Training, Positioning, Neuromuscular Re-education, Stair training, Wheelchair Mobility Training  Safety Devices  Type of devices:  All fall risk precautions in place, Left in chair, Call light within reach, Chair alarm in place, Nurse notified, Gait belt, Patient at risk for falls    AM-PAC Score  AM-PAC Inpatient Mobility Raw Score : 9 (07/04/21 [facility])  AM-PAC Inpatient T-Scale Score : 30.55 (07/04/21 [facility])  Mobility Inpatient CMS 0-100% Score: 81.38 (07/04/21 [facility])  Mobility Inpatient CMS G-Code Modifier : CM (07/04/21 [facility])          Goals  Short term goals  Time Frame for Short term goals: 1 week 7/11/21 (unless otherwise specified)  Short term goal 1: Pt will complete supine to/from sit with modA x 1  Short term goal 2: Pt will complete sit to/from stand with LRAD with Diana/modA x 2  Short term goal 3: Pt will complete bed <> chair with LRAD with Diana/modA x 2  Short term goal 4: Pt will demonstrate static to dynamic standing x 1 minute with Diana x 2 without LOB  Short term goal 5: 7/08/21: Pt will participate in 12-15 reps BLE exercises to increase strength and increase I with functional mobility  Patient Goals   Patient goals : \"Go to rehab and get stronger\"       Therapy Time   Individual Concurrent Group Co-treatment   Time In 0933         Time Out 1008         Minutes 35         Timed Code Treatment Minutes: 25 Minutes (10 minutes for eval)     If pt is unable to be seen after this session, please let this note serve as discharge summary. Please see case management note for discharge disposition. Thank you.     Juana Landrum, PT, DPT

## 2021-07-04 NOTE — PROGRESS NOTES
Left leg cleansed with normal saline. Open blisters on lateral calf covered with mepilex and legs wrapped with kerlex and ace wraps per order. Left third toe cleansed and covered with band aid. Right leg cleansed and wrapped with ace wrap. Wound care consult placed.

## 2021-07-05 NOTE — PLAN OF CARE
Problem: Falls - Risk of:  Goal: Absence of physical injury  Description: Absence of physical injury  Outcome: Ongoing  Note: Pt at risk for falls. Instructed to use call light before getting out of bed and when in need of assistance. Call light within reach. Bed in low position. Bed alarm and nonskid footwear on. Will continue to monitor. Problem: Pain:  Goal: Control of acute pain  Description: Control of acute pain  Outcome: Ongoing  Note: Pt will be satisfied with pain control. Pt uses numeric pain rating scale with reassessments after pain med administration. Will continue to monitor progression throughout shift. Problem: Skin Integrity:  Goal: Absence of new skin breakdown  Description: Absence of new skin breakdown  Outcome: Ongoing  Note: Pt is at risk for skin breakdown. Pt will have skin assessments every shift, Q2 turns, heels elevated off of the bed, and friction and shear prevented when possible. Will continue to monitor for signs of skin breakdown and enforce prevention measures.

## 2021-07-05 NOTE — CONSULTS
Progress Note    HISTORY     CC:   Shortness of breath               We are following for cardiorenal syndrome       Subjective/   HPI:  Patient well known to our group. Recently at [facility] and now comes back as unable to take care of her self. Weight is up and edema. She is on room air.   Scr is 2.4      ROS:  Constitutional:  No fevers, No Chills, + weakness  Cardiovascular:  No palpations, + edema  Respiratory:  No wheezing, no cough  Skin:  No rash, no itching  :  No hematuria, no dysuria     Social Hx:  Family at the bedside     Past Medical and Surgical History:  - Reviewed, no changes     EXAM       Objective/     Vitals:    07/05/21 0615 07/05/21 0800 07/05/21 0850 07/05/21 1134   BP: 100/67 102/68  120/67   Pulse: 72 71  72   Resp: 18 18  18   Temp: 97 °F (36.1 °C) 97.8 °F (36.6 °C)  98 °F (36.7 °C)   TempSrc: Axillary Oral  Axillary   SpO2: 97% 97% 98% 97%   Weight:       Height:         24HR INTAKE/OUTPUT:      Intake/Output Summary (Last 24 hours) at 7/5/2021 1607  Last data filed at 7/5/2021 1430  Gross per 24 hour   Intake 835 ml   Output 1600 ml   Net -765 ml     Constitutional:  Alert, awake, no apparent distress  Eyes:  Pupils reactive, sclera clear   Neck:  Normal thyroid, no masses   Cardiovascular:  Regular, no rub  Respiratory:  No distress, no wheezing  Psychiatry:  Appropriate mood/affect, alert  Abdomen: +bs, soft, nt, no masses   Musculoskeletal: ++ LE edema, no clubbing   Lymphatics:  No LAD in neck, no supraclavicular nodes       MEDICAL DECISION MAKING       Data/  Recent Labs     07/02/21 1830 07/05/21  0723   WBC 9.0 7.8   HGB 12.2 11.7*   HCT 37.6 35.6*   MCV 87.5 86.9    266     Recent Labs     07/02/21 1830 07/05/21  0723   * 129*   K 4.4 4.2   CL 90* 90*   CO2 27 25   GLUCOSE 172* 107*   BUN 73* 83*   CREATININE 2.4* 2.4*   LABGLOM 21* 21*   GFRAA 25* 25*       Assessment/        Chronic Kidney Disease:  Stage IV  - No proteinuria.  Multiple episodes of SHAUNNA  - Highly variable depending on cardiac and volume status but overall worsening      Chronic Hypotension:  Impacts pressure natriuresis      Hyponatremia:  Hypervolemic / Due to CHF   On tolvaptan 3 days a week     Systolic Heart Failure:  EF = 25%              Worsening edema and says she had gained 30 lbs but on room air    Plan/     Metolazone  Lasix IV with lasix gtt  Tolvaptan  Patient is requiring very high dose diuretics. Not yet at the point of dialysis but discussed that we may need to accept a higher creatinine to get her euvolemic.   Perhaps in the 3 to 3.5 range and she might be near to dialysis   Follow labs to see how she tolerates  Monitor weights  Monitor in/outs   -----------------------------  Jennifer Johns M.D.   [facility]

## 2021-07-05 NOTE — CARE COORDINATION
FER Wilkinson received VM that pt has been accepted to [facility] for rehab. Maoeallinda precert will need to be started ASAP.

## 2021-07-05 NOTE — PROGRESS NOTES
Hospitalist Progress Note      PCP: Nick Storm PA-C    Date of Admission: 7/2/2021      Brief HPI    60 y.o. female with multiple medical problems including but not limited to chronic combined systolic and diastolic heart failure EF 25%, DM type II, CAD status post CABG, COPD presented to emergency room with generalized weakness. .. The patient had just been discharged from [facility], admitted from 6/21/7/21 with acute decompensated combined systolic and diastolic heart failure complicated by hyponatremia, SHAUNNA, physical deconditioning. . Managed with Lasix drip. .. SNF was recommended at discharge but she declined. .. When she went home, she realized she was too weak to ambulate and called EMS that brought her to [facility]. Jigar Cuello On presentation, vital signs are stable. Ginny Alexandra was clear. .  The patient is now agreeable to going to a skilled nursing facility for rehab    Subjective:     Pt feels generally weak and feels  SOB today.  Awaiting precert for SNF       Medications:  Reviewed    Infusion Medications    dextrose      sodium chloride       Scheduled Medications    apixaban  5 mg Oral BID    aspirin EC  81 mg Oral Daily    atorvastatin  80 mg Oral Nightly    benztropine  1 mg Oral Nightly    budesonide-formoterol  2 puff Inhalation BID    bumetanide  2 mg Oral BID    busPIRone  30 mg Oral BID    clopidogrel  75 mg Oral Daily    gabapentin  300 mg Oral TID    hydrALAZINE  10 mg Oral 3 times per day    lamoTRIgine  200 mg Oral BID    magnesium oxide  400 mg Oral Daily    metoprolol tartrate  50 mg Oral BID    pantoprazole  40 mg Oral BID    QUEtiapine  25 mg Oral Nightly    tiotropium  2 puff Inhalation Daily    tolvaptan  15 mg Oral Q MWF    insulin glargine  0.15 Units/kg Subcutaneous Nightly    insulin lispro  0.05 Units/kg Subcutaneous TID WC    insulin lispro  0-6 Units Subcutaneous TID WC    insulin lispro  0-3 Units Subcutaneous Nightly     PRN Meds: glucose, dextrose, glucagon (rDNA), dextrose, sodium chloride flush, sodium chloride, ondansetron **OR** ondansetron, acetaminophen **OR** acetaminophen      Intake/Output Summary (Last 24 hours) at 7/5/2021 0848  Last data filed at 7/5/2021 0809  Gross per 24 hour   Intake 1075 ml   Output 1600 ml   Net -525 ml       Exam:    /68   Pulse 71   Temp 97.8 °F (36.6 °C) (Oral)   Resp 18   Ht 5' 4\" (1.626 m)   Wt 279 lb 1.6 oz (126.6 kg)   LMP 10/24/2012   SpO2 97%   BMI 47.91 kg/m²     General appearance: No apparent distress, appears stated age and cooperative. HEENT: Pupils equal, round, and reactive to light. Conjunctivae/corneas clear. Neck: Supple, with full range of motion. No jugular venous distention. Trachea midline. Respiratory:  Normal respiratory effort. Clear to auscultation, bilaterally without Rales/Wheezes/Rhonchi. Cardiovascular: Regular rate and rhythm with normal S1/S2 without murmurs, rubs or gallops. Abdomen: Soft, non-tender, non-distended with normal bowel sounds. Musculoskeletal: No clubbing, cyanosis. bilat pedal edema with ACE wraps in place   Skin: warm and dry   Neurologic:  Alert speech clear with no overt facial droop  Psychiatric: Alert and oriented, thought content appropriate, normal insight        Labs:   Recent Labs     07/02/21 1830 07/05/21  0723   WBC 9.0 7.8   HGB 12.2 11.7*   HCT 37.6 35.6*    266     Recent Labs     07/02/21 1830 07/05/21  0723   * 129*   K 4.4 4.2   CL 90* 90*   CO2 27 25   BUN 73* 83*   CREATININE 2.4* 2.4*   CALCIUM 9.3 9.1     Recent Labs     07/02/21 1830   AST 31   ALT 25   BILITOT 1.3*   ALKPHOS 281*     No results for input(s): INR in the last 72 hours.   Recent Labs     07/02/21 1830   TROPONINI 0.03*       Assessment/Plan:      -Physical deconditioning/generalized weakness due to CHF--- needs SNF for rehab     -Chronic combined systolic and diastolic heart failure, EF 25% on echo 4/25-[facility]--- Feels SOB today with BUN uptrending, Cr stable. Will consult nephro for vol status management as may need more diuretics. Continue Bumex, Aldactone, hydralazine     -Coronary artery disease status post CABG--stable--continue aspirin, Plavix, statin, beta-blocker        -Hyponatremia -hypervolemic due to CHF. .. Sodium improved from recent admission--continue tolvaptan Monday Wednesday and Friday monitor sodium levels. F/u with nephrology      -CKD stage IV with recent SHAUNNA--creatinine is currently stable, BUN uptrending. Nephro consulted. Monitor and  avoid nephrotoxic medications        -Obstructive sleep apnea-not on CPAP use--patient cannot afford it     -DM type II, insulin independent, uncontrolled hemoglobin A1c 11.9-continue home regimen     -QDOI-[id number] inhaled steroids bronchodilators     -Obesity with BMI 45--- continue modified diet for weight loss     -Chronic venous insufficiency with stasis dermatitis venous ulcers-continue diuretics and Ace wraps     -Peripheral vascular disease status post prior angioplasty of multiple vessels-including right renal artery , right SFA . Dixon Decker Status post right femoropopliteal bypass -continue antiplatelets and statin    -Paroxysmal atrial flutter, fibrillation--stable-continue Eliquis and not peripheral     -Acute urinary retention 7/3/21--s/p Lees placement--DC Lees in a few days    DVT Prophylaxis: Eliquis  Diet: ADULT DIET; Regular; 4 carb choices (60 gm/meal); Low Fat/Low Chol/High Fiber/CHARANJIT; 1500 ml  Code Status: Full Code    Disposition--nephro consulted given SOB today.  Awaiting pre-CERT for discharge to SNF         Giovanna Espino MD

## 2021-07-05 NOTE — PROGRESS NOTES
Occupational Therapy  Facility/Department: [facility] B3 - MED SURG  Daily Treatment Note  NAME: Leandra Simon  : 1965  MRN: [id number]    Date of Service: 2021    Discharge Recommendations:  Subacute/Skilled Nursing Facility  OT Equipment Recommendations  Other: defer    Assessment   Performance deficits / Impairments: Decreased functional mobility ; Decreased ADL status; Decreased strength;Decreased safe awareness;Decreased cognition;Decreased balance;Decreased sensation;Decreased posture;Decreased endurance    Assessment: Pt pleasant and agreeable to therapy. Pt confused upon arrival and attempting to sit up. Pt required max A x2 for bed mobility and limited to bleednig from wounds. RN aware and in room during session. Pt required min A to change gown. Pt educated on BUE exercises for increased endurance and strength. cont with POC>    Disease Specific Education: Pt educated on importance of OOB mobility, prevention of complications of bedrest, and general safety during hospitalization. Pt verbalized understanding       Prognosis: Fair  OT Education: OT Role;Plan of Care;ADL Adaptive Strategies;Transfer Training;Energy Conservation;Equipment  Patient Education: benefits of OOB and increased activity  Barriers to Learning: pain  REQUIRES OT FOLLOW UP: Yes  Activity Tolerance  Activity Tolerance: Patient Tolerated treatment well;Patient limited by fatigue  Activity Tolerance: /75 HR 73 O2 96  Safety Devices  Safety Devices in place: Yes  Type of devices: Bed alarm in place;Call light within reach; Left in bed;Nurse notified         Patient Diagnosis(es): The encounter diagnosis was Generalized weakness.       has a past medical history of Acute blood loss anemia, Acute kidney injury ([address] Acute kidney injury superimposed on CKD ([address] Acute on chronic combined systolic and diastolic congestive heart failure ([address] Acute on chronic right-sided heart failure ([address] Alcohol dependence ([address] Arthritis, Asthma, CAD (coronary artery disease), Cellulitis, COPD (chronic obstructive pulmonary disease) ([address] Diabetic ulcer of left foot associated with type 2 diabetes mellitus, limited to breakdown of skin ([address] Diabetic ulcer of toe of right foot associated with type 2 diabetes mellitus ([address] Left renal artery stenosis ([address] MI (myocardial infarction) ([address] Positive FIT (fecal immunochemical test), 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 has a past surgical history that includes Tonsillectomy; Cholecystectomy; tumor excision; Upper gastrointestinal endoscopy (4/25/2013); Upper gastrointestinal endoscopy (12/10/2015); Upper gastrointestinal endoscopy (01/06/2017); Arterial bypass surgry (Left, 01/06/2016); Cardiac catheterization (03/27/2018); Coronary angioplasty with stent (03/16/2018); Rotator cuff repair (Left, 04/22/2016); Coronary angioplasty with stent (01/03/2018); Insertable Cardiac Monitor (02/14/2019); femoral bypass (Right, 5/16/2019); transluminal angioplasty (Left, 10/08/2019); Cardiac catheterization (01/20/2020); Coronary artery bypass graft (N/A, 1/27/2020); vascular surgery (Left, 12/08/2015); transluminal angioplasty (Left, 04/29/2020); vascular surgery (Bilateral, 07/07/2020); Coronary angioplasty with stent (10/07/2019); transesophageal echocardiogram (01/26/2020); vascular surgery (Left, 08/04/2020); vascular surgery (Left, 08/05/2020); and vascular surgery (Left, 09/01/2020).     Restrictions  Restrictions/Precautions  Restrictions/Precautions: General Precautions, Fall Risk, Up as Tolerated  Required Braces or Orthoses?: No  Position Activity Restriction  Other position/activity restrictions: rhonda roche     Subjective   General  Chart Reviewed: Yes  Patient assessed for rehabilitation services?: Yes  Family / Caregiver Present: No  Diagnosis: generalized weakness  Subjective  Subjective: Pt supine in bed upon arrival and agreeable to session, attempting to get to EOB upon arrival  General Comment  Comments: RN approval prior to session stating last night it took three people to assist pt off toilet with maryse and a staff memeber got hurt d/t pt going dependent during t/f  Pain Assessment  Pain Assessment: Faces  Mcgarry-Baker Pain Rating: Hurts little more  Pain Type: Acute pain  Pain Location: Hip  Pain Orientation: Right;Left  Non-Pharmaceutical Pain Intervention(s): Repositioned  Vital Signs  Patient Currently in Pain: Yes     Orientation  Orientation  Overall Orientation Status: Within Functional Limits     Objective    ADL  Grooming: Setup  UE Dressing: Minimal assistance  LE Dressing: Dependent/Total  Toileting: Dependent/Total (roche)        Balance  Sitting Balance: Minimal assistance (posterior lean seated EOB)  Standing Balance  Time: ~8 minutes  Activity: seated EOB  Bed mobility  Supine to Sit: 2 Person assistance;Maximum assistance  Sit to Supine: Maximum assistance;2 Person assistance  Scooting: Maximal assistance;2 Person assistance  Transfers  Transfer Comments: not attempted at this time due wound care                       Cognition  Overall Cognitive Status: Exceptions  Arousal/Alertness: Appropriate responses to stimuli  Following Commands:  Follows one step commands consistently  Attention Span: Appears intact  Memory: Appears intact  Safety Judgement: Decreased awareness of need for assistance  Problem Solving: Assistance required to generate solutions  Insights: Decreased awareness of deficits  Initiation: Requires cues for some  Sequencing: Requires cues for some                    Type of ROM/Therapeutic Exercise  Type of ROM/Therapeutic Exercise: AROM;AAROM  Comment: BUE seated  Exercises  Elbow Flexion: x10  Elbow Extension: x10  Supination: x10  Pronation: x10  Wrist Flexion: x10  Wrist Extension: x10  Grasp/Release: x10                    Plan   Plan  Times per week: 3-5x/wk  Current Treatment Recommendations: Strengthening, Patient/Caregiver Education & Training, Equipment Evaluation, Education, & procurement, Balance Training, Functional Mobility Training, Endurance Training, Pain Management, Cognitive/Perceptual Training, Safety Education & Training, Self-Care / ADL    AM-PAC Score        AM-PAC Inpatient Daily Activity Raw Score: 14 (07/05/21 1603)  AM-PAC Inpatient ADL T-Scale Score : 33.39 (07/05/21 1603)  ADL Inpatient CMS 0-100% Score: 59.67 (07/05/21 1603)  ADL Inpatient CMS G-Code Modifier : CK (07/05/21 1603)    Goals  Short term goals  Time Frame for Short term goals: 1-2 weeks  Short term goal 1: Pt will complete toilet t/f with max A x1. Short term goal 2: Pt will complete UB ADLs with S/U.- ongoing mod A  Short term goal 3: Pt will tolerate standing x3 mins to increase participation in ADLs. - ongoing 5 minutes seated EOB  Short term goal 4: Pt will complete x3 sets x15 reps BU E therex to increase strength. by (7/08/21)  Patient Goals   Patient goals : get stronger, walk       Therapy Time   Individual Concurrent Group Co-treatment   Time In 1525         Time Out 1548         Minutes 23         Timed Code Treatment Minutes: 2101 Kaur Jimenez OTR/L    If pt is unable to be seen after this session, please let this note serve as discharge summary. Please see case management note for discharge disposition. Thank you.

## 2021-07-05 NOTE — CARE COORDINATION
In the interest of a timely discharge, CM initiated precert with [facility] - precert packet/documentation faxed. [facility] to confirm receipt [facility] tomorrow. Agency closed today in observance of July 4 holiday.

## 2021-07-05 NOTE — CARE COORDINATION
Per chart review, referral made to [facility] on 7/3/21 per Luly BOWMAN. No call back from facility if able to accept. Upon further review, face sheet was not efaxed. CM efaxed face sheet on this day. CM also received VM from admissions that they did not receive patient information on VM left by previous CM. CM attempted to reach admissions team at [facility]. No answer. VM left for [facility] in admissions at [address]. Pending call back from SNF.     Kailey Ventura RN

## 2021-07-06 NOTE — PROGRESS NOTES
see Education Tab    Progressive Mobility Assessment:  What is this patient's Current Level of Mobility?: Requires Bed Rest  How was this patient Mobilized today?: Edge of Bed, Up to Chair, Bedside Commode,  Up to Toilet/Shower, Up in Room, Up in Hallway and Patient Refuses to Mobilize                 With Whom? PCA, PT, OT and Self                 Level of Difficulty/Assistance: 2x Assist     Pt resting in bed at this time on room air. Pt denies shortness of breath. Pt with pitting lower extremity edema.      Patient and/or Family's stated Goal of Care this Admission: increase activity tolerance, better understand heart failure and disease management, be more comfortable and reduce lower extremity edema prior to discharge.        :

## 2021-07-06 NOTE — PROGRESS NOTES
Hospitalist Progress Note      PCP: Marcus Arriola PA-C    Date of Admission: 7/2/2021      Brief HPI    60 y.o. female with multiple medical problems including but not limited to chronic combined systolic and diastolic heart failure EF 25%, DM type II, CAD status post CABG, COPD presented to emergency room with generalized weakness. .. The patient had just been discharged from [facility] in [place], admitted from 6/21/7/21 with acute decompensated combined systolic and diastolic heart failure complicated by hyponatremia, SHAUNNA, physical deconditioning. . Managed with Lasix drip. .. SNF was recommended at discharge but she declined. .. When she went home, she realized she was too weak to ambulate and called EMS that brought her to [facility]. Simon Bhakta On presentation, vital signs are stable. Arie Gave was clear. .  The patient is now agreeable to going to a skilled nursing facility for rehab    Subjective:     Pt sleepy but easily arousable. Reports SOB improving.  On lasix ggt    Medications:  Reviewed    Infusion Medications    furosemide (LASIX) 1mg/ml infusion 20 mg/hr (07/06/21 2117)    dextrose      sodium chloride       Scheduled Medications    potassium chloride  40 mEq Intravenous Once    apixaban  5 mg Oral BID    aspirin EC  81 mg Oral Daily    atorvastatin  80 mg Oral Nightly    benztropine  1 mg Oral Nightly    budesonide-formoterol  2 puff Inhalation BID    busPIRone  30 mg Oral BID    clopidogrel  75 mg Oral Daily    gabapentin  300 mg Oral TID    hydrALAZINE  10 mg Oral 3 times per day    lamoTRIgine  200 mg Oral BID    magnesium oxide  400 mg Oral Daily    metoprolol tartrate  50 mg Oral BID    pantoprazole  40 mg Oral BID    QUEtiapine  25 mg Oral Nightly    tiotropium  2 puff Inhalation Daily    tolvaptan  15 mg Oral Q MWF    insulin glargine  0.15 Units/kg Subcutaneous Nightly    insulin lispro  0.05 Units/kg Subcutaneous TID WC    insulin lispro  0-6 Units Subcutaneous TID WC    insulin lispro  0-3 Units Subcutaneous Nightly     PRN Meds: glucose, dextrose, glucagon (rDNA), dextrose, sodium chloride flush, sodium chloride, ondansetron **OR** ondansetron, acetaminophen **OR** acetaminophen      Intake/Output Summary (Last 24 hours) at 7/6/2021 0839  Last data filed at 7/6/2021 4521  Gross per 24 hour   Intake 323.57 ml   Output 5350 ml   Net -5026.43 ml       Exam:    /67   Pulse 77   Temp 97.4 °F (36.3 °C) (Oral)   Resp 18   Ht 5' 4\" (1.626 m)   Wt 275 lb 2.2 oz (124.8 kg)   LMP 10/24/2012   SpO2 97%   BMI 47.23 kg/m²     General appearance: No apparent distress, appears stated age and cooperative. sleepy  HEENT: Pupils equal, round, and reactive to light. Conjunctivae/corneas clear. Neck: Supple, with full range of motion. No jugular venous distention. Trachea midline. Respiratory:  Normal respiratory effort. Clear to auscultation, bilaterally without Rales/Wheezes/Rhonchi. Cardiovascular: Regular rate and rhythm with normal S1/S2 without murmurs, rubs or gallops. Abdomen: Soft, non-tender, non-distended with normal bowel sounds. Musculoskeletal: No clubbing, cyanosis. bilat pedal edema with ACE wraps in place   Skin: warm and dry   Neurologic: somnolent but easily arousable,  speech clear with no overt facial droop  Psychiatric:  sleepy        Labs:   Recent Labs     07/05/21  0723   WBC 7.8   HGB 11.7*   HCT 35.6*        Recent Labs     07/05/21  0723 07/06/21  0657   * 132*   K 4.2 2.9*   CL 90* 91*   CO2 25 28   BUN 83* 89*   CREATININE 2.4* 2.4*   CALCIUM 9.1 9.4   PHOS  --  5.4*     No results for input(s): AST, ALT, BILIDIR, BILITOT, ALKPHOS in the last 72 hours. No results for input(s): INR in the last 72 hours. No results for input(s): [place] in the last 72 hours.     Assessment/Plan:      -Physical deconditioning/generalized weakness due to CHF--- needs SNF for rehab     - Acute on Chronic combined systolic and diastolic heart failure,

## 2021-07-06 NOTE — PLAN OF CARE
Problem: OXYGENATION/RESPIRATORY FUNCTION  Goal: Patient will maintain patent airway  Outcome: Ongoing    Patient's EF (Ejection Fraction) is less than 40%    Heart Failure Medications:  Diuretics[de-identified] Furosemide    (One of the following REQUIRED for EF <40%/SYSTOLIC FAILURE but MAY be used in EF% >40%/DIASTOLIC FAILURE)        ACE[de-identified] None        ARB[de-identified] None         ARNI[de-identified] None    (Beta Blockers)  NON- Evidenced Based Beta Blocker (for EF% >40%/DIASTOLIC FAILURE): Metoprolol TARTrate- Lopressor    Evidenced Based Beta Blocker::(REQUIRED for EF% <40%/SYSTOLIC FAILURE) None  . .................................................................................................................................................. Patient's weights and intake/output reviewed: Yes    Patient's Last Weight: 279 lbs obtained by bed scale. Difference of 4 lbs more than last documented weight. Intake/Output Summary (Last 24 hours) at 7/6/2021 0143  Last data filed at 7/5/2021 2335  Gross per 24 hour   Intake 323.57 ml   Output 2625 ml   Net -2301.43 ml       Comorbidities Reviewed Yes    Patient has a past medical history of Acute blood loss anemia, Acute kidney injury ([address] Acute kidney injury superimposed on CKD ([address] Acute on chronic combined systolic and diastolic congestive heart failure ([address] Acute on chronic right-sided heart failure ([address] Alcohol dependence ([address] Arthritis, Asthma, CAD (coronary artery disease), Cellulitis, COPD (chronic obstructive pulmonary disease) ([address] Diabetic ulcer of left foot associated with type 2 diabetes mellitus, limited to breakdown of skin ([address] Diabetic ulcer of toe of right foot associated with type 2 diabetes mellitus ([address] Left renal artery stenosis ([address] MI (myocardial infarction) ([address] Positive FIT (fecal immunochemical test), 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      >>For CHF and Comorbidity documentation on Education Time and Topics, please see Education Tab    Progressive Mobility Assessment:  What is this patient's Current Level of Mobility?: Requires Bed Rest  How was this patient Mobilized today?: Edge of Bed and Up to Chair                 With Whom? Nurse, PCA, PT, and OT                 Level of Difficulty/Assistance: 2x Assist     Pt resting in bed at this time on room air. Pt denies shortness of breath. Pt with pitting lower extremity edema.      Patient and/or Family's stated Goal of Care this Admission: increase activity tolerance, better understand heart failure and disease management, be more comfortable, and reduce lower extremity edema prior to discharge

## 2021-07-06 NOTE — PROGRESS NOTES
Physical Therapy  Facility/Department: [facility] B3 - MED SURG  Daily Treatment Note  NAME: Kasia Bradford  : 1965  MRN: [id number]    Date of Service: 2021    Discharge Recommendations:  Subacute/Skilled Nursing Facility   PT Equipment Recommendations  Equipment Needed: No  Other: Defer to next level of care    Assessment   Body structures, Functions, Activity limitations: Decreased functional mobility ; Decreased sensation; Increased pain;Decreased balance;Decreased ROM; Decreased strength;Decreased safe awareness;Decreased endurance  Assessment: Pt presents w/ slight decrease in mobility/activity tolerance compared to previous sessions. Pt very lethargic this session but able to perform and tolerate various ther-ex w/o complaint. Pt able to follow commands w/ repitition. Due to current presentation, unsafe to attempt transfers/amb/etc. Pt will continue to benefit from skilled PT services to improve function towards baseline. She will benefit from SNF upon d/c due to medical care needed and continued therapy services. Treatment Diagnosis: Impaired functional mobility and balance  Specific instructions for Next Treatment: Progress mobility as tolerated  Prognosis: Good  Decision Making: Medium Complexity  PT Education: Goals; General Safety;PT Role;Disease Specific Education;Plan of Care; Functional Mobility Training;Pressure Relief;Precautions; Injury Prevention;Transfer Training;Energy Conservation;Equipment  Patient Education: Pt educated on importance of OOB exercise and participation in PT. Pt demonstrated understanding  Barriers to Learning: none  REQUIRES PT FOLLOW UP: Yes  Activity Tolerance  Activity Tolerance: Patient limited by fatigue;Patient limited by pain; Patient limited by endurance     Patient Diagnosis(es): The encounter diagnosis was Generalized weakness.      has a past medical history of Acute blood loss anemia, Acute kidney injury ([address] Acute kidney injury superimposed on CKD ([address] Acute on chronic combined systolic and diastolic congestive heart failure (HCC), Acute on chronic right-sided heart failure ([address] Alcohol dependence ([address] Arthritis, Asthma, CAD (coronary artery disease), Cellulitis, COPD (chronic obstructive pulmonary disease) ([address] Diabetic ulcer of left foot associated with type 2 diabetes mellitus, limited to breakdown of skin ([address] Diabetic ulcer of toe of right foot associated with type 2 diabetes mellitus ([address] Left renal artery stenosis ([address] MI (myocardial infarction) ([address] Positive FIT (fecal immunochemical test), 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 has a past surgical history that includes Tonsillectomy; Cholecystectomy; tumor excision; Upper gastrointestinal endoscopy (4/25/2013); Upper gastrointestinal endoscopy (12/10/2015); Upper gastrointestinal endoscopy (01/06/2017); Arterial bypass surgry (Left, 01/06/2016); Cardiac catheterization (03/27/2018); Coronary angioplasty with stent (03/16/2018); Rotator cuff repair (Left, 04/22/2016); Coronary angioplasty with stent (01/03/2018); Insertable Cardiac Monitor (02/14/2019); femoral bypass (Right, 5/16/2019); transluminal angioplasty (Left, 10/08/2019); Cardiac catheterization (01/20/2020); Coronary artery bypass graft (N/A, 1/27/2020); vascular surgery (Left, 12/08/2015); transluminal angioplasty (Left, 04/29/2020); vascular surgery (Bilateral, 07/07/2020); Coronary angioplasty with stent (10/07/2019); transesophageal echocardiogram (01/26/2020); vascular surgery (Left, 08/04/2020); vascular surgery (Left, 08/05/2020); and vascular surgery (Left, 09/01/2020).     Restrictions  Restrictions/Precautions  Restrictions/Precautions: General Precautions, Fall Risk, Up as Tolerated  Required Braces or Orthoses?: No  Position Activity Restriction  Other position/activity restrictions: rhonda roche   General  Chart Reviewed: Yes  Additional Pertinent Hx: Per Dr. Charles Corado H&P \"62 y.o. female with multiple medical problems including but not limited to chronic combined systolic and diastolic heart failure EF 25%, DM type II, CAD status post CABG, COPD presented to emergency room with generalized weakness\"  Response To Previous Treatment: Not applicable  Family / Caregiver Present: No  Referring Practitioner: Fabiana Reid MD  Subjective  Subjective: Pt supine in bed on approach, agreeable to PT tx  General Comment  Comments: RN cleared pt for session  Pain Screening  Patient Currently in Pain: Denies  Pain Assessment  Pain Assessment: 0-10  Pain Level: 0  Patient's Stated Pain Goal: 8  Pain Type: Acute pain  Pain Location: Back;Leg  Pain Orientation: Right;Left  Pain Descriptors: Aching  Pain Frequency: Continuous  Pain Onset: On-going  Clinical Progression: Not changed  Functional Pain Assessment: Prevents or interferes with all active and some passive activities  Non-Pharmaceutical Pain Intervention(s): Repositioned; Ambulation/Increased Activity  Response to Pain Intervention: Patient Satisfied  Vital Signs  Pulse: 77  BP: 105/71  BP Location: Left upper arm  Patient Currently in Pain: Denies  Oxygen Therapy  SpO2: 97 %  O2 Device: None (Room air)       Orientation  Orientation  Overall Orientation Status: Within Functional Limits  Objective   Bed mobility  Supine to Sit: Unable to assess  Sit to Supine: Unable to assess  Scooting: Unable to assess  Comment: Pt very lethargic this session  Transfers  Sit to Stand: Unable to assess  Stand to sit: Unable to assess  Bed to Chair: Unable to assess  Comment: Pt very lethargic, transfers unsafe to attempt  Ambulation  Ambulation?: No  Stairs/Curb  Stairs?: No     Balance  Posture: Fair  Sitting - Static: Fair  Sitting - Dynamic: Fair;-  Comments: Pt req Prosper/SBA to maintain upright posture  Exercises  Heelslides: 2x10 Bilat  Gluteal Sets: 2x10 bilat  Knee Short Arc Quad: 2x10 bilat  Ankle Pumps: 2x10 bilat  Comments: Cues for sequence/technique   AROM RLE (degrees)  RLE General AROM: hip and knee decreased approx 50% secondary to pain, weakness and body habitus, ankle WFL  AROM LLE (degrees)  LLE General AROM: hip and knee decreased approx  25-50% secondary to weakness and body habitus, ankle WFL  Strength RLE  Comment: Hip grossly 3-5, knee grossly 3-/5 and ankle grossly 3/5  Strength LLE  Comment: Hip grossly 3-5, knee and ankle grossly 3/5     AM-PAC Score  AM-PAC Inpatient Mobility Raw Score : 9 (07/06/21 1055)  AM-PAC Inpatient T-Scale Score : 30.55 (07/06/21 1055)  Mobility Inpatient CMS 0-100% Score: 81.38 (07/06/21 1055)  Mobility Inpatient CMS G-Code Modifier : CM (07/06/21 1055)          Goals  Short term goals  Time Frame for Short term goals: 1 week 7/11/21 (unless otherwise specified)  Short term goal 1: Pt will complete supine to/from sit with modA x 1  Short term goal 2: Pt will complete sit to/from stand with LRAD with Diana/modA x 2  Short term goal 3: Pt will complete bed <> chair with LRAD with Diana/modA x 2  Short term goal 4: Pt will demonstrate static to dynamic standing x 1 minute with Diana x 2 without LOB  Short term goal 5: 7/08/21: Pt will participate in 12-15 reps BLE exercises to increase strength and increase I with functional mobility  Patient Goals   Patient goals : \"Go to rehab and get stronger\"    Plan    Plan  Times per week: 3-5x/wk  Times per day: Daily  Specific instructions for Next Treatment: Progress mobility as tolerated  Current Treatment Recommendations: Strengthening, Home Exercise Program, ROM, Safety Education & Training, Balance Training, Endurance Training, Patient/Caregiver Education & Training, Functional Mobility Training, Transfer Training, Gait Training, Positioning, Neuromuscular Re-education, Stair training, Wheelchair Mobility Training  Safety Devices  Type of devices:  All fall risk precautions in place, Call light within reach, Nurse notified, Gait belt, Patient at risk for falls, Left in bed, Bed alarm in place     Therapy Time   Individual Concurrent Group Co-treatment   Time In 1035         Time Out 1100         Minutes 25         Timed Code Treatment Minutes: 25 Minutes       Read MANNY Oleary    If pt is unable to be seen after this session, please let this note serve as discharge summary. Please see case management note for discharge disposition. Thank you.

## 2021-07-06 NOTE — PROGRESS NOTES
Occupational Therapy  Facility/Department: [facility] B3 - MED SURG  Daily Treatment Note  NAME: Yany Shah  : 1965  MRN: [id number]    Date of Service: 2021    Discharge Recommendations:  Subacute/Skilled Nursing Facility  OT Equipment Recommendations  Equipment Needed: No  Other: defer    Assessment   Performance deficits / Impairments: Decreased functional mobility ; Decreased ADL status; Decreased strength;Decreased safe awareness;Decreased cognition;Decreased balance;Decreased sensation;Decreased posture;Decreased endurance  Assessment: Pt agreeable to OT session. Pt lethargic throughout limiting participation. Pt completed BUE AROM ther ex at bed level with redirection 2/2 lethargy. Pt reported she thought she had a BM in the bed during ther ex. Pt rolled side to side with Min A using bedrails. Pt did not have BM, but hygiene completed with Max A for thoroughness. Cont OT POC. Prognosis: Fair  OT Education: OT Role;Plan of Care;ADL Adaptive Strategies;Transfer Training;Energy Conservation;Equipment  Patient Education: benefits of OOB and increased activity  Barriers to Learning: pain  REQUIRES OT FOLLOW UP: Yes  Activity Tolerance  Activity Tolerance: Patient Tolerated treatment well;Patient limited by fatigue  Activity Tolerance: BP= 90/57, HR=78, O2=98%  Safety Devices  Safety Devices in place: Yes  Type of devices: Bed alarm in place;Call light within reach; Left in bed;Nurse notified       Patient Diagnosis(es): The encounter diagnosis was Generalized weakness.       has a past medical history of Acute blood loss anemia, Acute kidney injury ([address] Acute kidney injury superimposed on CKD ([address] Acute on chronic combined systolic and diastolic congestive heart failure ([address] Acute on chronic right-sided heart failure ([address] Alcohol dependence ([address] Arthritis, Asthma, CAD (coronary artery disease), Cellulitis, COPD (chronic obstructive pulmonary disease) ([address] Diabetic ulcer of left foot associated with type 2 diabetes mellitus, limited to breakdown of skin ([address] Diabetic ulcer of toe of right foot associated with type 2 diabetes mellitus ([address] Left renal artery stenosis ([address] MI (myocardial infarction) ([address] Positive FIT (fecal immunochemical test), 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 has a past surgical history that includes Tonsillectomy; Cholecystectomy; tumor excision; Upper gastrointestinal endoscopy (4/25/2013); Upper gastrointestinal endoscopy (12/10/2015); Upper gastrointestinal endoscopy (01/06/2017); Arterial bypass surgry (Left, 01/06/2016); Cardiac catheterization (03/27/2018); Coronary angioplasty with stent (03/16/2018); Rotator cuff repair (Left, 04/22/2016); Coronary angioplasty with stent (01/03/2018); Insertable Cardiac Monitor (02/14/2019); femoral bypass (Right, 5/16/2019); transluminal angioplasty (Left, 10/08/2019); Cardiac catheterization (01/20/2020); Coronary artery bypass graft (N/A, 1/27/2020); vascular surgery (Left, 12/08/2015); transluminal angioplasty (Left, 04/29/2020); vascular surgery (Bilateral, 07/07/2020); Coronary angioplasty with stent (10/07/2019); transesophageal echocardiogram (01/26/2020); vascular surgery (Left, 08/04/2020); vascular surgery (Left, 08/05/2020); and vascular surgery (Left, 09/01/2020). Restrictions  Restrictions/Precautions  Restrictions/Precautions: General Precautions, Fall Risk, Up as Tolerated  Required Braces or Orthoses?: No  Position Activity Restriction  Other position/activity restrictions: rhonda roche  Subjective   General  Chart Reviewed: Yes  Patient assessed for rehabilitation services?: Yes  Family / Caregiver Present: No  Diagnosis: generalized weakness  Subjective  Subjective: Pt supine in bed upon arrival and agreeable to session. lethargic.   General Comment  Comments: Per RN ok for therapy  Vital Signs  Patient Currently in Pain: Denies   Orientation  Orientation  Overall Orientation Status: Within Functional Limits  Objective    ADL  Toileting: Dependent/Total (roche; pt reported that she had a BM in bed, pt rolled side to side, but did not have a BM. Pt wiped clean with Max A for thorough cleanliness)     Balance  Sitting Balance: Unable to assess(comment)  Standing Balance: Unable to assess(comment)  Standing Balance  Comment: pt lethargic and reported feeling \"too tired\" to sit EOB  Bed mobility  Rolling to Right: Moderate assistance  Supine to Sit: Unable to assess  Sit to Supine: Unable to assess  Scooting: Minimal assistance (toward HOB using bedrails)  Comment: pt very lethargic this session  Transfers  Transfer Comments: not attempted at this time 2/2 lethargy      Cognition  Overall Cognitive Status: Exceptions  Arousal/Alertness: Appropriate responses to stimuli  Following Commands:  Follows one step commands consistently  Attention Span: Appears intact  Memory: Appears intact  Safety Judgement: Decreased awareness of need for assistance  Problem Solving: Assistance required to generate solutions  Insights: Decreased awareness of deficits  Initiation: Requires cues for some  Sequencing: Requires cues for some      Type of ROM/Therapeutic Exercise  Type of ROM/Therapeutic Exercise: AROM  Comment: BUE seated  Exercises  Shoulder Flexion: x10 to 90*  Elbow Flexion: x10  Elbow Extension: x10  Supination: x10  Pronation: x10  Wrist Flexion: x10  Wrist Extension: x10  Grasp/Release: x10      Plan   Plan  Times per week: 3-5x/wk  Current Treatment Recommendations: Strengthening, Patient/Caregiver Education & Training, Equipment Evaluation, Education, & procurement, Balance Training, Functional Mobility Training, Endurance Training, Pain Management, Cognitive/Perceptual Training, Safety Education & Training, Self-Care / ADL    AM-PAC Score     AM-[facility] Inpatient Daily Activity Raw Score: 14 (07/06/21 0959)  AM-PAC Inpatient ADL T-Scale Score : 33.39 (07/06/21 1559)  ADL Inpatient CMS 0-100% Score: 59.67 (07/06/21 1559)  ADL Inpatient CMS G-Code Modifier : CK (07/06/21 1559)    Goals  Short term goals  Time Frame for Short term goals: 1-2 weeks  Short term goal 1: Pt will complete toilet t/f with max A x1. Short term goal 2: Pt will complete UB ADLs with S/U.- ongoing mod A  Short term goal 3: Pt will tolerate standing x3 mins to increase participation in ADLs. - ongoing 5 minutes seated EOB  Short term goal 4: Pt will complete x3 sets x15 reps BU E therex to increase strength. by (GOAL MET 7/6- continue )  Patient Goals   Patient goals : get stronger, walk       Therapy Time   Individual Concurrent Group Co-treatment   Time In 1504         Time Out 1527         Minutes 23         Timed Code Treatment Minutes: 23 Minutes       Jordana OTR/L    If pt is unable to be seen after this session, please let this note serve as discharge summary. Please see case management note for discharge disposition. Thank you.

## 2021-07-06 NOTE — CARE COORDINATION
CM called [facility] to verify receipt of precert packet that was faxed yesterday at approximately 3pm. \"Meredith\" with [employer] states packet was not received. Packet refaxed and CM initiated case via telephone.      Ref # [id number]

## 2021-07-06 NOTE — CARE COORDINATION
Call received from [facility]" with Janusz. States she has to cancel \"previous auth that was approved 7/1\". Upon chart review, pt was approved for SNF on 7/1 after d/c from [facility] However, pt chose to [facility] at that time. Previous auth cancelled by Sheyla Colbert. States she will start new auth today. Updated PT notes now in chart. Notes faxed to [address].

## 2021-07-06 NOTE — CARE COORDINATION
VM received from \"Brianna\" with [employer] called with precert approval for [facility]. Auth ID: [id number]  [id number] Ref # [id number]    [facility] Nursing. Approved 3 days. Start date 7/6  NRD 7/8  Aleena Baker, [facility], for updated clinicals  [phone].        Madalyn Bloch, RN

## 2021-07-06 NOTE — CONSULTS
injury superimposed on CKD ([facility] 8/5/2020    Acute on chronic combined systolic and diastolic congestive heart failure ([address] 1/10/2020    Acute on chronic right-sided heart failure ([address] 08/2020    Alcohol dependence ([address] 3/10/2010    Arthritis     Asthma     CAD (coronary artery disease)     Cellulitis 6/3/2020    COPD (chronic obstructive pulmonary disease) (HCC)     Diabetic ulcer of left foot associated with type 2 diabetes mellitus, limited to breakdown of skin ([address] 1/18/2020    Diabetic ulcer of toe of right foot associated with type 2 diabetes mellitus ([address] 1/18/2020    Left renal artery stenosis ([address] 08/2020    MI (myocardial infarction) ([address] 10/07/2019    NSTEMI    Positive FIT (fecal immunochemical test) 2/28/2020    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 steal syndrome 09/01/2020    Traumatic perinephric hematoma, left, initial encounter 8/4/2020       PAST SURGICAL HISTORY    Past Surgical History:   Procedure Laterality Date    ARTERIAL BYPASS SURGRY Left 01/06/2016    Axillary/Subclavian to Brachial Bypass w/reversed SVG    CARDIAC CATHETERIZATION  03/27/2018    Dr. Refugio Miranda - at [facility]  01/20/2020    Dr. Yen Mendez      pancreatitis post surgery    CORONARY ANGIOPLASTY WITH STENT PLACEMENT  03/16/2018    MELODY- 3.5 x 38 and 3.5 x 20 to Cx    CORONARY ANGIOPLASTY WITH STENT PLACEMENT  01/03/2018    MELODY- 3.0 x 38 and 2.75 x 26 and 2.75 x 8 and 2.75 x 22 to the LAD    CORONARY ANGIOPLASTY WITH STENT PLACEMENT  10/07/2019    MELODY- 2.5 x 8 to [address] GRAFT N/A 1/27/2020    CORONARY ARTERY BYPASS GRAFTING X 1, ON PUMP BEATING HEART, INTERNAL MAMMARY ARTERY TO LEFT ANTERIOR DESCENDING ARTERY, VAS CATH INSERTION, URI, PLATELET GEL APPLICATION, 5 LEVEL BILATERAL INTERCOSTAL NERVE BLOCK, STERNAL PLATING performed by Anne Marie Bustamante MD at [address] [place] [address] Right 5/16/2019 fem-pop, performed by Jaden Cronin MD at [address]  02/14/2019    CardioMEMs insertion for CHF    ROTATOR CUFF REPAIR Left 04/22/2016    TONSILLECTOMY      TRANSESOPHAGEAL ECHOCARDIOGRAM  01/26/2020    TRANSLUMINAL ANGIOPLASTY Left 10/08/2019    with stenting, at [facility]    TRANSLUMINAL ANGIOPLASTY Left 04/29/2020    of the [facility] re-occlusion    TUMOR EXCISION      benign tumors X 2 chest & axilla    UPPER GASTROINTESTINAL ENDOSCOPY  4/25/2013    BX barretts, HH, gastritis    UPPER GASTROINTESTINAL ENDOSCOPY  12/10/2015    UPPER GASTROINTESTINAL ENDOSCOPY  01/06/2017    Gastritis    VASCULAR SURGERY Left 12/08/2015    upper extremity and mesenteric angiogram (diagnostic only)    VASCULAR SURGERY Bilateral 07/07/2020    diagnostic lower extremity angiogram only    VASCULAR SURGERY Left 08/04/2020    angioplasty and stent of renal artery    VASCULAR SURGERY Left 08/05/2020    coil embolization of branch renal artery vessel for bleeding    VASCULAR SURGERY Left 09/01/2020    angioplasty and stenting of subclavian artery       FAMILY HISTORY    Family History   Problem Relation Age of Onset    Heart Disease Maternal Grandmother     Cancer Mother         lung and brain cancer    Cancer Maternal Aunt         lung and brain cancer       SOCIAL HISTORY    Social History     Tobacco Use    Smoking status: Current Every Day Smoker     Packs/day: 1.00     Years: 30.00     Pack years: 30.00     Types: Cigarettes    Smokeless tobacco: Never Used   Vaping Use    Vaping Use: Never used   Substance Use Topics    Alcohol use: No     Comment: quit 2006     Drug use: Never       ALLERGIES    Allergies   Allergen Reactions    Lisinopril Other (See Comments)     Severe hypotension       MEDICATIONS    No current facility-administered medications on file prior to encounter.      Current Outpatient Medications on File Prior to Encounter   Medication Sig Dispense Refill    apixaban (ELIQUIS) 2.5 MG TABS tablet Take 2 tablets by mouth 2 times daily 60 tablet 1    Insulin Degludec (TRESIBA FLEXTOUCH) 100 UNIT/ML SOPN Inject 3 Units into the skin nightly 10 pen 5    hydrALAZINE (APRESOLINE) 10 MG tablet Take 1 tablet by mouth every 8 hours 90 tablet 3    metoprolol tartrate (LOPRESSOR) 50 MG tablet Take 1 tablet by mouth 2 times daily 60 tablet 3    bumetanide (BUMEX) 2 MG tablet Take 1 tablet by mouth 2 times daily 30 tablet 3    tolvaptan (SAMSCA) 15 MG TABS tablet Take 1 tab Monday, Wednesday and Friday 30 tablet 1    Cholecalciferol (VITAMIN D) 25 MCG TABS Take 1 tablet by mouth daily 60 tablet 1    QUEtiapine (SEROQUEL) 25 MG tablet Take 1 tablet by mouth nightly 30 tablet 0    gabapentin (NEURONTIN) 300 MG capsule Take 300 mg by mouth 3 times daily.  Blood Glucose Monitoring Suppl (ONE TOUCH ULTRA 2) w/Device KIT USE TO MONITOR BLOOD GLUCOSE LEVELS 1 kit 0    ONE TOUCH ULTRASOFT LANCETS MISC USE TO TEST BLOOD GLUCOSE LEVELS 4 TIMES DAILY 150 each 3    blood glucose test strips (ASCENSIA AUTODISC VI;ONE TOUCH ULTRA TEST VI) strip USE TO MONITOR BLOOD GLUCOSE LEVELS 4 TIMES DAILY 150 each 3    budesonide-formoterol (SYMBICORT) 160-4.5 MCG/ACT AERO Inhale 2 puffs into the lungs 2 times daily 1 Inhaler 5    nitroGLYCERIN (NITROSTAT) 0.4 MG SL tablet Place 1 tablet under the tongue every 5 minutes as needed for Chest pain up to max of 3 total doses.  If no relief after 1 dose, call 911. 25 tablet 0    Insulin Pen Needle (B-D ULTRAFINE III SHORT PEN) 31G X 8 MM MISC Five shots a day 200 each 5    atorvastatin (LIPITOR) 80 MG tablet TAKE 1 TABLET BY MOUTH EVERY DAY AT NIGHT 90 tablet 3    blood glucose test strips (FREESTYLE LITE) strip USE TO CHECK BLOOD GLUCOSE LEVELS FOUR TIMES DAILY 200 strip 10    tiotropium (SPIRIVA RESPIMAT) 2.5 MCG/ACT AERS inhaler INHALE 2 PUFFS INTO THE LUNGS DAILY 1 Inhaler 6    benztropine (COGENTIN) 1 MG tablet Take 1 mg by mouth nightly       INSULIN SYRINGE .5CC/29G 29G X 1/2\" 0.5 ML MISC 1 each by Does not apply route daily 100 each 3    pantoprazole (PROTONIX) 40 MG tablet Take 1 tablet by mouth 2 times daily 60 tablet 0    clopidogrel (PLAVIX) 75 MG tablet TAKE 1 TABLET BY MOUTH EVERY DAY 30 tablet 5    magnesium oxide (MAG-OX) 400 (240 Mg) MG tablet TAKE 1 TABLET ONCE DAILY 30 tablet 11    busPIRone (BUSPAR) 30 MG tablet Take 30 mg by mouth 2 times daily       aspirin EC 81 MG EC tablet Take 1 tablet by mouth daily 30 tablet 3    ARIPiprazole (ABILIFY) 15 MG tablet Take 15 mg by mouth daily       lamoTRIgine (LAMICTAL) 200 MG tablet Take 200 mg by mouth 2 times daily          Objective  Trying to get out of bed c/o of her butt hurting from lying on it. BP 96/69   Pulse 77   Temp 97.4 °F (36.3 °C) (Oral)   Resp 18   Ht 5' 4\" (1.626 m)   Wt 275 lb 2.2 oz (124.8 kg)   LMP 10/24/2012   SpO2 98%   BMI 47.23 kg/m²     LABS:  WBC:    Lab Results   Component Value Date    WBC 7.8 07/05/2021     H/H:    Lab Results   Component Value Date    HGB 11.7 07/05/2021    HCT 35.6 07/05/2021     PTT:    Lab Results   Component Value Date    APTT 29.4 06/22/2021   [APTT}  PT/INR:    Lab Results   Component Value Date    PROTIME 13.5 06/22/2021    INR 1.16 06/22/2021     HgBA1c:    Lab Results   Component Value Date    LABA1C 11.9 05/20/2021       Assessment  Scattered dry scabs on face and arms. Patient stated they itch a lot and she scratches the areas until bleed. On area open right wrist and left face that is bleeding. Cluster of open venous ulcers on left lower anterior and lateral leg. These wounds are mostly red with one depth 0.6 cm that has some yellow slough in the wound base. Left great and 2nd toe distal tip dry with hard brown eschar and 3rd metatarsal dorsal toe with moist yellow slough. Left lower leg with edema and some redness. Right lower leg and dorsal foot with a dry ulcers.   See photo's   Vivek Risk Score: Vivek Scale Score: 15    Patient Active Problem List   Diagnosis    Type 2 diabetes mellitus with diabetic polyneuropathy, with long-term current use of insulin ([address]    Essential hypertension    CLINT (obstructive sleep apnea)    Tobacco abuse disorder    GERD (gastroesophageal reflux disease)    Anxiety and depression    Hyponatremia    Iron deficiency anemia    CAD (coronary artery disease)    Mitral valve regurgitation    COPD (chronic obstructive pulmonary disease) ([address]    SHAUNNA (acute kidney injury) ([address]    Vitamin D deficiency    Dyslipidemia    Abel's esophagus    Acute on chronic congestive heart failure (HCC)    Viral hepatitis C    Pulmonary nodule    Class 2 severe obesity due to excess calories with serious comorbidity and body mass index (BMI) of 39.0 to 39.9 in adult ([address]    Bipolar disorder ([address]    Pulmonary hypertension (HCC)    Lower extremity edema    Habitual self-excoriation    Ulcer of left lower leg, limited to breakdown of skin (HCC)    Ulcer of right leg, limited to breakdown of skin ([address]    Arthritis    Cardiomyopathy ([address]    Chronic systolic CHF (congestive heart failure) (HCC)    Chronic renal impairment    Peripheral venous insufficiency    PAD (peripheral artery disease) (HCC)    Acute kidney injury superimposed on CKD ([address]    Morbid obesity with BMI of 40.0-44.9, adult ([address]    CKD (chronic kidney disease) stage 4, GFR 15-29 ml/min (HCC)    Hypokalemia    Dyspnea on exertion    Ischemic heart disease    Moderate protein-calorie malnutrition (HCC)    DKA, type 2, not at goal [facility])    Acute on chronic systolic CHF (congestive heart failure) (HCC)    Acute systolic CHF (congestive heart failure) (HCC)    CHF (congestive heart failure), NYHA class I, acute on chronic, combined ([address]    Cellulitis    Closed nondisplaced fracture of navicular bone of left foot    Heart failure ([address]    Generalized weakness       Measurements:  Wound 01/11/21 Chest (Active)   Number of days: 175       Wound 04/22/21 Leg Left; Lower; Anterior; Lateral wounds in clusters. (Active)   Wound Image   07/06/21 1444   Wound Etiology Venous 07/06/21 1444   Dressing Status New dressing applied 07/06/21 1444   Wound Cleansed Cleansed with saline 07/06/21 1444   Dressing/Treatment Alginate with Ag;Dry dressing;Roll gauze; Ace wrap 07/06/21 1444   Dressing Change Due 07/07/21 07/06/21 1444   Wound Length (cm) 15 cm 07/06/21 1444   Wound Width (cm) 9 cm 07/06/21 1444   Wound Depth (cm) 0.6 cm 07/06/21 1444   Wound Surface Area (cm^2) 135 cm^2 07/06/21 1444   Change in Wound Size % (l*w) 37.06 07/06/21 1444   Wound Volume (cm^3) 81 cm^3 07/06/21 1444   Wound Healing % 37 07/06/21 1444   Distance Tunneling (cm) 0 cm 07/06/21 1444   Tunneling Position ___ O'Clock 0 07/06/21 1444   Undermining Starts ___ O'Clock 0 07/06/21 1444   Undermining Ends___ O'Clock 0 07/06/21 1444   Undermining Maxium Distance (cm) 0 07/06/21 1444   Wound Assessment Granulation tissue;Slough 07/06/21 1444   Drainage Amount Moderate 07/06/21 1444   Drainage Description Serosanguinous; Serous 07/06/21 1444   Odor Mild 07/06/21 1444   Shaye-wound Assessment Edematous; Blanchable erythema 07/06/21 1444   Margins Attached edges; Defined edges 07/06/21 1444   Wound Thickness Description not for Pressure Injury Full thickness 07/06/21 1444   Number of days: 75       Wound 04/22/21 Toe (Comment  which one) Left cluster tip of 1 + 2, dorsal on toe 3 brown and yellow slough. (Active)   Number of days: 75       Wound 04/22/21 Leg Right (Active)   Number of days: 75       Wound 07/06/21 Radial Distal;Left skin tear, steri strips applied (Active)   Number of days: 0            Response to treatment:  Well tolerated by patient. Pain Assessment:  Severity:  0 / 10  Quality of pain: N/A  Wound Pain Timing/Severity: none  Premedicated: No    Plan   Plan of Care: Wound 04/22/21 Leg Left; Lower; Anterior; Lateral wounds in clusters. -Dressing/Treatment: Alginate with Ag, Dry dressing, Roll gauze, Ace wrap    Recommend:  Clean all wounds with normal saline. Apply alginate AG / opticelAg to left lower leg open wounds, cover with dry 4xx4 and roll guaze daily. Left 1-4 toes, paint with betadine, cover with alginate Ag to open areas then alginate AG into web spaces and open area on 3rd toe, dry dressing, roll guaze daily and 2 ace wraps from toes to knees. Ace wraps on during the day and off at night. Apply foam dressings to small open areas (from picking) on left face and right wrist, change every 3 days and prn. Wound care to follow. Call wound care for deterioration [phone]. Specialty Bed Required : Yes   [] Low Air Loss   [x] Pressure Redistribution  [] Fluid Immersion  [] Bariatric  [] Total Pressure Relief  [] Other:     Current Diet: ADULT DIET; Regular; 4 carb choices (60 gm/meal); 1000 ml  Dietician consult:  No    Discharge Plan:  Placement for patient upon discharge: intermediate care facility    Patient appropriate for Outpatient [address]: Yes    Referrals:  []  / discharge planner following  [] [address] [place] [facility]  [] Supplies  [] Other    Patient/Caregiver Teaching: instructed on treatment.   Level of patient/caregiver understanding able to:   [] Indicates understanding       [x] Needs reinforcement  [] Unsuccessful      [] Verbal Understanding  [] Demonstrated understanding       [] No evidence of learning  [] Refused teaching         [] N/A       Electronically signed by Corrie Gonzales RN, MSN, [place] Held on 7/6/2021 at 2:50 PM

## 2021-07-07 NOTE — PLAN OF CARE
Problem: OXYGENATION/RESPIRATORY FUNCTION  Goal: Patient will maintain patent airway  Outcome: Ongoing     Patient's EF (Ejection Fraction) is less than 40%    Heart Failure Medications:  Diuretics[de-identified] Furosemide and Metalozone    (One of the following REQUIRED for EF <40%/SYSTOLIC FAILURE but MAY be used in EF% >40%/DIASTOLIC FAILURE)        ACE[de-identified] None        ARB[de-identified] None         ARNI[de-identified] None    (Beta Blockers)  NON- Evidenced Based Beta Blocker (for EF% >40%/DIASTOLIC FAILURE): Metoprolol TARTrate- Lopressor    Evidenced Based Beta Blocker::(REQUIRED for EF% <40%/SYSTOLIC FAILURE) None  . .................................................................................................................................................. Patient's weights and intake/output reviewed: Yes    Patient's Last Weight: 275 lbs obtained by bed scale. Difference of 4 lbs less than last documented weight. Intake/Output Summary (Last 24 hours) at 7/7/2021 0017  Last data filed at 7/6/2021 2201  Gross per 24 hour   Intake 1173.8 ml   Output 7425 ml   Net -6251.2 ml       Comorbidities Reviewed Yes    Patient has a past medical history of Acute blood loss anemia, Acute kidney injury ([address] Acute kidney injury superimposed on CKD ([address] Acute on chronic combined systolic and diastolic congestive heart failure ([address] Acute on chronic right-sided heart failure ([address] Alcohol dependence ([address] Arthritis, Asthma, CAD (coronary artery disease), Cellulitis, COPD (chronic obstructive pulmonary disease) ([address] Diabetic ulcer of left foot associated with type 2 diabetes mellitus, limited to breakdown of skin ([address] Diabetic ulcer of toe of right foot associated with type 2 diabetes mellitus ([address] Left renal artery stenosis ([address] MI (myocardial infarction) ([address] Positive FIT (fecal immunochemical test), 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      >>For CHF and Comorbidity documentation on Education Time and Topics, please see Education Tab    Progressive Mobility Assessment:  What is this patient's Current Level of Mobility?: Requires Bed Rest  How was this patient Mobilized today?: Edge of Bed and Unable to Mobilize                 With Whom? Nurse, PCA, PT, and OT                 Level of Difficulty/Assistance: 2x Assist     Pt resting in bed at this time on room air. Pt denies shortness of breath. Pt with pitting lower extremity edema.      Patient and/or Family's stated Goal of Care this Admission: reduce shortness of breath, increase activity tolerance, better understand heart failure and disease management, be more comfortable, and reduce lower extremity edema prior to discharge    The patient will demonstrate an understanding of s/s & treatment of hyperglycemia, by verbalization,  with the goal of completion at discharge.      :

## 2021-07-07 NOTE — PROGRESS NOTES
Physical Therapy  Facility/Department: [facility] - MED SURG  Daily Treatment Note  NAME: Yany Shah  : 1965  MRN: [id number]    Date of Service: 2021    Discharge Recommendations:  Subacute/Skilled Nursing Facility   PT Equipment Recommendations  Equipment Needed: No  Other: Defer to next level of care    Assessment   Body structures, Functions, Activity limitations: Decreased functional mobility ; Decreased sensation; Increased pain;Decreased balance;Decreased ROM; Decreased strength;Decreased safe awareness;Decreased endurance  Assessment: Pt less lethargic this session after asissted up to EOB with Mod A for bed mobility with transition. Pt attempted sit<>stand transfer from EOB to SW with Max A x2 and decreased tolerance for standing fatiguing quickly. Pt then stood to [employer] stedy with Max A x2 and transferred to chair. Pt will continue to benefit from skilled PT services to improve function towards baseline. She will benefit from SNF upon d/c due to medical care needed and continued therapy services. Treatment Diagnosis: Impaired functional mobility and balance  Prognosis: Good  Decision Making: Medium Complexity  Patient Education: Pt educated on importance of OOB exercise and participation in PT. Pt demonstrated understanding  Barriers to Learning: none  REQUIRES PT FOLLOW UP: Yes  Activity Tolerance  Activity Tolerance: Patient limited by fatigue;Patient limited by pain; Patient limited by endurance  Activity Tolerance: BP=95/58, HR=78, O2=96% seated EOB; YB=430/67 seated after transfer     Patient Diagnosis(es): The encounter diagnosis was Generalized weakness.      has a past medical history of Acute blood loss anemia, Acute kidney injury ([address] Acute kidney injury superimposed on CKD ([address] Acute on chronic combined systolic and diastolic congestive heart failure ([address] Acute on chronic right-sided heart failure ([address] Alcohol dependence ([address] Arthritis, Asthma, CAD (coronary artery disease), Cellulitis, COPD (chronic obstructive pulmonary disease) ([address] Diabetic ulcer of left foot associated with type 2 diabetes mellitus, limited to breakdown of skin ([address] Diabetic ulcer of toe of right foot associated with type 2 diabetes mellitus ([address] Left renal artery stenosis ([address] MI (myocardial infarction) ([address] Positive FIT (fecal immunochemical test), 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 has a past surgical history that includes Tonsillectomy; Cholecystectomy; tumor excision; Upper gastrointestinal endoscopy (4/25/2013); Upper gastrointestinal endoscopy (12/10/2015); Upper gastrointestinal endoscopy (01/06/2017); Arterial bypass surgry (Left, 01/06/2016); Cardiac catheterization (03/27/2018); Coronary angioplasty with stent (03/16/2018); Rotator cuff repair (Left, 04/22/2016); Coronary angioplasty with stent (01/03/2018); Insertable Cardiac Monitor (02/14/2019); femoral bypass (Right, 5/16/2019); transluminal angioplasty (Left, 10/08/2019); Cardiac catheterization (01/20/2020); Coronary artery bypass graft (N/A, 1/27/2020); vascular surgery (Left, 12/08/2015); transluminal angioplasty (Left, 04/29/2020); vascular surgery (Bilateral, 07/07/2020); Coronary angioplasty with stent (10/07/2019); transesophageal echocardiogram (01/26/2020); vascular surgery (Left, 08/04/2020); vascular surgery (Left, 08/05/2020); and vascular surgery (Left, 09/01/2020). Restrictions  Restrictions/Precautions  Restrictions/Precautions: General Precautions, Fall Risk, Up as Tolerated  Required Braces or Orthoses?: No  Position Activity Restriction  Other position/activity restrictions: rhonda roche   General  Chart Reviewed:  Yes  Additional Pertinent Hx: Per Dr. Rob Mckeon H&P \"62 y.o. female with multiple medical problems including but not limited to chronic combined systolic and diastolic heart failure EF 25%, DM type II, CAD status post CABG, COPD presented to emergency room with generalized weakness\"  Response To Previous Treatment: Patient with no complaints from previous session.   Family / Caregiver Present:  (dtr and dtr's boyfriend in and out during session)  Referring Practitioner: Bettye Wade MD  Subjective  Subjective: Pt supine in bed on approach, agreeable to PT tx  General Comment  Comments: RN cleared pt for session  Pain Screening  Patient Currently in Pain: Yes (with movt')  Pain Assessment  Pain Assessment: Faces  Mcgarry-Baker Pain Rating: Hurts a little bit  Non-Pharmaceutical Pain Intervention(s): Ambulation/Increased Activity;Repositioned  Vital Signs  Patient Currently in Pain: Yes (with movt')       Orientation  Orientation  Overall Orientation Status: Within Functional Limits  Cognition      Objective   Bed mobility  Supine to Sit: Moderate assistance  Transfers  Sit to Stand: 2 Person Assistance;Maximum Assistance  Stand to sit: 2 Person Assistance;Maximum Assistance  Bed to Chair: Dependent/Total (johny stedy)  Ambulation  Ambulation?: No     Balance  Comments: Sitting Balance: Stand by assistance  Standing Balance: Dependent/Total (min x2 with SW)  Exercises  Heelslides: 2x10 Bilat  Gluteal Sets: 2x10 bilat  Hip Abduction: 10 B  Knee Long Arc Quad: 10 B  Knee Short Arc Quad: 2x10 bilat  Ankle Pumps: 2x10 bilat         Comment: standing EOB; transfer in stedy to chair            AM-PAC Score  AM-PAC Inpatient Mobility Raw Score : 9 (07/07/21 1316)  AM-PAC Inpatient T-Scale Score : 30.55 (07/07/21 1316)  Mobility Inpatient CMS 0-100% Score: 81.38 (07/07/21 1316)  Mobility Inpatient CMS G-Code Modifier : CM (07/07/21 1316)          Goals  Short term goals  Time Frame for Short term goals: 1 week 7/11/21 (unless otherwise specified)  Short term goal 1: Pt will complete supine to/from sit with modA x 1   -7/07 Mod A  Short term goal 2: Pt will complete sit to/from stand with LRAD with Diana/modA x 2  -7/07 Max 2 johny serra  Short term goal 3: Pt will complete bed <> chair with LRAD with Diana/modA x 2   -7/07 dependent w/ johny serra  Short term goal 4: Pt will demonstrate static to dynamic standing x 1 minute with Diana x 2 without LOB   -7/07 not met  Short term goal 5: 7/08/21: Pt will participate in 12-15 reps BLE exercises to increase strength and increase I with functional mobility   -7/07  initiated  Patient Goals   Patient goals : \"Go to rehab and get stronger\"    Plan    Plan  Times per week: 3-5x/wk  Times per day: Daily  Specific instructions for Next Treatment: Progress mobility as tolerated  Current Treatment Recommendations: Strengthening, Home Exercise Program, ROM, Safety Education & Training, Balance Training, Endurance Training, Patient/Caregiver Education & Training, Functional Mobility Training, Transfer Training, Gait Training, Positioning, Neuromuscular Re-education, Stair training, Wheelchair Mobility Training  Safety Devices  Type of devices:  All fall risk precautions in place, Call light within reach, Nurse notified, Gait belt, Patient at risk for falls, Left in chair, Chair alarm in place     Therapy Time   Individual Concurrent Group Co-treatment   Time In 1013         Time Out 1051         Minutes 38         Timed Code Treatment Minutes: [address]

## 2021-07-07 NOTE — PROGRESS NOTES
Occupational Therapy  Facility/Department: [facility] B3 - MED SURG  Daily Treatment Note  NAME: Kanwal Hernández  : 1965  MRN: [id number]    Date of Service: 2021    Discharge Recommendations:  Subacute/Skilled Nursing Facility     Assessment   Performance deficits / Impairments: Decreased functional mobility ; Decreased ADL status; Decreased strength;Decreased safe awareness;Decreased cognition;Decreased balance;Decreased sensation;Decreased posture;Decreased endurance  Assessment: Pt agreeable to OT session. Pt completed grooming at bed level. UB ther ex completed supine with rest breaks and encouragement throughout 2/2 distraction and lethargy. Pt more alert when seated EOB with Mod A for bed mobility with transition. Pt attempted sit<>stand transfer from EOB to SW with Max A x2 and decreased tolerance for standing fatiguing quickly. Pt then stood to [employer] stedy with Max A x2 and transferred to chair. Pt completed BUE AROM ther ex in bed and seated grooming task with setup and supervision. Cont OT POC. Prognosis: Fair  OT Education: OT Role;Plan of Care;ADL Adaptive Strategies;Transfer Training;Energy Conservation;Equipment  Patient Education: benefits of OOB and increased activity, safe transfers  Barriers to Learning: pain  REQUIRES OT FOLLOW UP: Yes  Activity Tolerance  Activity Tolerance: Patient Tolerated treatment well;Patient limited by pain; Patient limited by fatigue  Activity Tolerance: BP=95/58, HR=78, O2=96% seated EOB; AK=669/67 seated after transfer  Safety Devices  Safety Devices in place: Yes  Type of devices: Gait belt;Left in chair;Chair alarm in place;Nurse notified;Call light within reach       Patient Diagnosis(es): The encounter diagnosis was Generalized weakness.       has a past medical history of Acute blood loss anemia, Acute kidney injury ([address] Acute kidney injury superimposed on CKD ([address] Acute on chronic combined systolic and diastolic congestive heart failure ([address] Acute on chronic right-sided heart failure ([address] Alcohol dependence ([address] Arthritis, Asthma, CAD (coronary artery disease), Cellulitis, COPD (chronic obstructive pulmonary disease) ([address] Diabetic ulcer of left foot associated with type 2 diabetes mellitus, limited to breakdown of skin ([address] Diabetic ulcer of toe of right foot associated with type 2 diabetes mellitus ([address] Left renal artery stenosis ([address] MI (myocardial infarction) ([address] Positive FIT (fecal immunochemical test), 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 has a past surgical history that includes Tonsillectomy; Cholecystectomy; tumor excision; Upper gastrointestinal endoscopy (4/25/2013); Upper gastrointestinal endoscopy (12/10/2015); Upper gastrointestinal endoscopy (01/06/2017); Arterial bypass surgry (Left, 01/06/2016); Cardiac catheterization (03/27/2018); Coronary angioplasty with stent (03/16/2018); Rotator cuff repair (Left, 04/22/2016); Coronary angioplasty with stent (01/03/2018); Insertable Cardiac Monitor (02/14/2019); femoral bypass (Right, 5/16/2019); transluminal angioplasty (Left, 10/08/2019); Cardiac catheterization (01/20/2020); Coronary artery bypass graft (N/A, 1/27/2020); vascular surgery (Left, 12/08/2015); transluminal angioplasty (Left, 04/29/2020); vascular surgery (Bilateral, 07/07/2020); Coronary angioplasty with stent (10/07/2019); transesophageal echocardiogram (01/26/2020); vascular surgery (Left, 08/04/2020); vascular surgery (Left, 08/05/2020); and vascular surgery (Left, 09/01/2020).     Restrictions  Restrictions/Precautions  Restrictions/Precautions: General Precautions, Fall Risk, Up as Tolerated  Required Braces or Orthoses?: No  Position Activity Restriction  Other position/activity restrictions: rhonda roche   General  Chart Reviewed: Yes  Patient assessed for rehabilitation services?: Yes  Family / Caregiver Present: No  Diagnosis: generalized weakness  Subjective  Subjective: Pt supine in bed upon arrival and agreeable to session. General Comment  Comments: Per RN ok for therapy  Pain Assessment  Pain Assessment: 0-10  Pain Level: 10  Pain Location: Knee;Buttocks; Coccyx  Non-Pharmaceutical Pain Intervention(s): Therapeutic touch; Therapeutic presence;Repositioned; Ambulation/Increased Activity;Relaxation techniques  Response to Pain Intervention: Patient Satisfied  Vital Signs  Patient Currently in Pain: Yes   Orientation  Orientation  Overall Orientation Status: Within Functional Limits  Objective    ADL  Grooming: Setup (wash face supine in bed)  LE Dressing: Dependent/Total (adjusting socks)  Toileting: Dependent/Total (roche)     Balance  Sitting Balance: Stand by assistance  Standing Balance: Dependent/Total (min x2 with SW)  Standing Balance  Time: ~1 min x2  Activity: standing EOB; with SW and in stedy  Comment: standing EOB; transfer in stedy to chair  Functional Mobility  Functional - Mobility Device: Other  Assist Level: Dependent/Total  Functional Mobility Comments: johny stedy bed>chair  Toilet Transfers  Toilet Transfers Comments: roche  Bed mobility  Supine to Sit: Moderate assistance  Sit to Supine: Unable to assess  Scooting: Minimal assistance  Comment: seated in chair at end of session  Transfers  Sit to stand: 2 Person assistance;Maximum assistance  Stand to sit: Maximum assistance;2 Person assistance  Transfer Comments: with SW and with johny stedy      Cognition  Overall Cognitive Status: Exceptions  Arousal/Alertness: Appropriate responses to stimuli  Following Commands:  Follows one step commands consistently  Attention Span: Appears intact  Memory: Appears intact  Safety Judgement: Decreased awareness of need for assistance  Problem Solving: Assistance required to generate solutions  Insights: Decreased awareness of deficits  Initiation: Requires cues for some  Sequencing: Requires cues for some      Type of ROM/Therapeutic Exercise  Type of ROM/Therapeutic Exercise: AROM  Comment: BUE seated  Exercises  Shoulder Flexion: x10 to 90*  Elbow Flexion: x10  Elbow Extension: x10  Supination: x10  Wrist Flexion: x10  Wrist Extension: x10  Grasp/Release: x10      Plan   Plan  Times per week: 3-5x/wk  Current Treatment Recommendations: Strengthening, Patient/Caregiver Education & Training, Equipment Evaluation, Education, & procurement, Balance Training, Functional Mobility Training, Endurance Training, Pain Management, Cognitive/Perceptual Training, Safety Education & Training, Self-Care / ADL    AM-PAC Score     AM-[facility] Inpatient Daily Activity Raw Score: 14 (07/06/21 1559)  AM-PAC Inpatient ADL T-Scale Score : 33.39 (07/06/21 1559)  ADL Inpatient CMS 0-100% Score: 59.67 (07/06/21 1559)  ADL Inpatient CMS G-Code Modifier : CK (07/06/21 1559)    Goals  Short term goals  Time Frame for Short term goals: 1-2 weeks  Short term goal 1: Pt will complete toilet t/f with max A x1. Short term goal 2: Pt will complete UB ADLs with S/U.- ongoing mod A  Short term goal 3: Pt will tolerate standing x3 mins to increase participation in ADLs. - ongoing standing EOB 2 min 7/7  Short term goal 4: Pt will complete x3 sets x15 reps BU E therex to increase strength. by (GOAL MET 7/6- continue )  Patient Goals   Patient goals : get stronger, walk     Therapy Time   Individual Concurrent Group Co-treatment   Time In 1009         Time Out 1049         Minutes 40         Timed Code Treatment Minutes: 40 Minutes       Jordana OTR/L    If pt is unable to be seen after this session, please let this note serve as discharge summary. Please see case management note for discharge disposition. Thank you.

## 2021-07-07 NOTE — PROGRESS NOTES
Hospitalist Progress Note      PCP: Randolph Mendoza PA-C    Date of Admission: 7/2/2021      Brief HPI    60 y.o. female with multiple medical problems including but not limited to chronic combined systolic and diastolic heart failure EF 25%, DM type II, CAD status post CABG, COPD presented to emergency room with generalized weakness. .. The patient had just been discharged from [facility], admitted from 6/21/7/21 with acute decompensated combined systolic and diastolic heart failure complicated by hyponatremia, SHAUNNA, physical deconditioning. . Managed with Lasix drip. .. SNF was recommended at discharge but she declined. .. When she went home, she realized she was too weak to ambulate and called EMS that brought her to [facility]. Alan Batista On presentation, vital signs are stable. Lynnette Leonard was clear. .  The patient is now agreeable to going to a skilled nursing facility for rehab    Subjective:      Pt awake and conversant. Reports SOB improving. Reports rt knee pain with movement,.  Denies any trauma to her knee, no fever or chills         Medications:  Reviewed    Infusion Medications    furosemide (LASIX) 1mg/ml infusion 20 mg/hr (07/07/21 8201)    dextrose      sodium chloride       Scheduled Medications    metOLazone  5 mg Oral Q MWF    apixaban  5 mg Oral BID    aspirin EC  81 mg Oral Daily    atorvastatin  80 mg Oral Nightly    benztropine  1 mg Oral Nightly    budesonide-formoterol  2 puff Inhalation BID    busPIRone  30 mg Oral BID    clopidogrel  75 mg Oral Daily    gabapentin  300 mg Oral TID    hydrALAZINE  10 mg Oral 3 times per day    lamoTRIgine  200 mg Oral BID    magnesium oxide  400 mg Oral Daily    metoprolol tartrate  50 mg Oral BID    pantoprazole  40 mg Oral BID    QUEtiapine  25 mg Oral Nightly    tiotropium  2 puff Inhalation Daily    insulin glargine  0.15 Units/kg Subcutaneous Nightly    insulin lispro  0.05 Units/kg Subcutaneous TID WC    insulin lispro  0-6 Units Subcutaneous TID WC    insulin lispro  0-3 Units Subcutaneous Nightly     PRN Meds: povidone-iodine, glucose, dextrose, glucagon (rDNA), dextrose, sodium chloride flush, sodium chloride, ondansetron **OR** ondansetron, acetaminophen **OR** acetaminophen      Intake/Output Summary (Last 24 hours) at 7/7/2021 0837  Last data filed at 7/7/2021 0836  Gross per 24 hour   Intake 1794.76 ml   Output 7550 ml   Net -5755.24 ml       Exam:    /73   Pulse 81   Temp 98.1 °F (36.7 °C) (Oral)   Resp 18   Ht 5' 4\" (1.626 m)   Wt 271 lb 9.7 oz (123.2 kg)   LMP 10/24/2012   SpO2 96%   BMI 46.62 kg/m²     General appearance: No apparent distress, appears stated age and cooperative. sleepy  HEENT: Pupils equal, round, and reactive to light. Conjunctivae/corneas clear. Neck: Supple, with full range of motion. No jugular venous distention. Trachea midline. Respiratory:  Normal respiratory effort. Clear to auscultation, bilaterally without Rales/Wheezes/Rhonchi. Cardiovascular: Regular rate and rhythm with normal S1/S2 without murmurs, rubs or gallops. Abdomen: Soft, non-tender, non-distended with normal bowel sounds. Musculoskeletal: No clubbing, cyanosis. bilat pedal edema improved. Rt knee edematous with limited ROM in rt knee due to pain, no diffrential warmth noted   Skin: warm and dry. Chronic venous stasis dermatitis in bilat LE's. Chronic venous ulcers in LLE with no overt purulent discharge. Old healed diabetic ulcers distal 1st and 2nd metatarsals and 3rd dorsal metatarsal on the left foot with no discharge or tenderness noted. Neurologic:  Alert, speech clear with no overt facial droop  Psychiatric:  Awake and oriented.        Labs:   Recent Labs     07/05/21  0723 07/07/21  0808   WBC 7.8 8.5   HGB 11.7* 11.5*   HCT 35.6* 34.8*    239     Recent Labs     07/05/21  0723 07/06/21  0657   * 132*   K 4.2 2.9*   CL 90* 91*   CO2 25 28   BUN 83* 89*   CREATININE 2.4* 2.4*   CALCIUM 9.1 9.4   PHOS --  5.4*     No results for input(s): AST, ALT, BILIDIR, BILITOT, ALKPHOS in the last 72 hours. No results for input(s): INR in the last 72 hours. No results for input(s): [place] in the last 72 hours. Assessment/Plan:      -Physical deconditioning/generalized weakness due to CHF--- needs SNF for rehab     - Acute on Chronic combined systolic and diastolic heart failure, EF 25% on echo 4/25-patient recently discharged from [facility] for acute decompensation. Nephro consulted for vol status management. On IV lasix ggt, metolazone. Monitor strict I's and O's , daily weights.       -Coronary artery disease status post CABG--stable--continue aspirin, Plavix, statin, beta-blocker        -Hyponatremia -hypervolemic due to CHF. .. Sodium improved from recent admission. Tolvaptan d/shira per nephro. Monitor sodium levels.          Hypokalemia : likely due to diuretics. Monitoring and replacing as needed per nephro recs      -CKD stage IV with recent SHAUNNA--. Nephro assisting. Monitor and  avoid nephrotoxic medications        -Obstructive sleep apnea-not on CPAP use--patient cannot afford it     -DM type II, insulin independent, uncontrolled hemoglobin A1c 11.9-continue home regimen     -DTQQ-qajphg-ruyfobkn inhaled steroids bronchodilators     -Obesity with BMI 45--- continue modified diet for weight loss     -Chronic venous insufficiency with stasis dermatitis venous ulcers-continue diuretics and Ace wraps     -Peripheral vascular disease status post prior angioplasty of multiple vessels-including right renal artery , right SFA . Status post right femoropopliteal bypass -continue antiplatelets and statin    -Paroxysmal atrial flutter, fibrillation--stable-continue Eliquis and not peripheral     -Acute urinary retention 7/3/21-- improved with Roche placement-- Continue roche  for now for strict UOP monitoring given acute CHF. Anxiety: mentation better today and not somnolent. Resume lamictal and buspar. Continue benztropine and seroquel. Rt knee pain : unclear cause. Xray to eval.     DVT Prophylaxis: Eliquis  Diet: ADULT DIET; Regular; 4 carb choices (60 gm/meal); 1000 ml  Code Status: Full Code    Disposition-- likely when euvolemic. Pt on IV diuresis per  nephro.           Janiya Borrego MD       ADDENDUM    Pt was complaining of LEFT not right knee pain as erroneously stated in the note above and had  Limited ROM in left  knee and not the right knee on exam

## 2021-07-07 NOTE — PROGRESS NOTES
Department of Internal Medicine  Nephrology Progress Note        SUBJECTIVE:    We are following this patient for CKD management. The patient was seen and examined; she feels well today with no CP, SOB, nausea or vomiting. ROS: No fever or chills. Social: Family at bedside. Physical Exam:    VITALS:  BP 99/65   Pulse 76   Temp 97.8 °F (36.6 °C) (Axillary)   Resp 16   Ht 5' 4\" (1.626 m)   Wt 271 lb 9.7 oz (123.2 kg)   LMP 10/24/2012   SpO2 96%   BMI 46.62 kg/m²     General appearance: Seems comfortable, no acute distress. Eyes: Conjunctivae pink, reactive pupils. Neck: Trachea midline, thyroid normal.   Lungs:  Non labored breathing, CTA to anterior auscultation. Heart:  S1S2 normal, rub or gallop. ++ peripheral edema. Abdomen: Soft, non-tender, no organomegaly. Skin: No lesions or rashes, warm to touch.        DATA:    CBC:   Lab Results   Component Value Date    WBC 8.5 07/07/2021    RBC 4.07 07/07/2021    HGB 11.5 07/07/2021    HCT 34.8 07/07/2021    MCV 85.3 07/07/2021    MCH 28.3 07/07/2021    MCHC 33.1 07/07/2021    RDW 16.6 07/07/2021     07/07/2021    MPV 8.3 07/07/2021     BMP:    Lab Results   Component Value Date     07/07/2021    K 2.8 07/07/2021    K 3.0 06/29/2021    CL 87 07/07/2021    CO2 29 07/07/2021    BUN 84 07/07/2021    LABALBU 3.7 07/06/2021    CREATININE 2.3 07/07/2021    CALCIUM 9.4 07/07/2021    GFRAA 26 07/07/2021    LABGLOM 22 07/07/2021    GLUCOSE 204 07/07/2021              Assessment/     Chronic Kidney Disease:  Stage IV  - No proteinuria.  Multiple episodes of SHAUNNA  - Highly variable depending on cardiac and volume status but overall worsening      Chronic Hypotension:  Impacts pressure natriuresis      Hyponatremia:  Hypervolemic / Due to CHF              On tolvaptan 3 days a week     Systolic Heart Failure:  EF = 25%              Worsening edema and says she had gained 30 lbs but on room air     Plan/   Continue IV Lasix drip     Metolazone 5 mg three times per week     PRN potassium replacement   Monitor serial labs

## 2021-07-08 NOTE — PROGRESS NOTES
Department of Internal Medicine  Nephrology Progress Note        SUBJECTIVE:    We are following this patient for CKD management. The patient was seen and examined; she was resting comfortably with no acute events noted overnight. ROS: No fever or chills. Social: Family at bedside. Physical Exam:    VITALS:  BP 98/60   Pulse 82   Temp 98 °F (36.7 °C) (Oral)   Resp 20   Ht 5' 4\" (1.626 m)   Wt 273 lb 5.9 oz (124 kg)   LMP 10/24/2012   SpO2 95%   BMI 46.92 kg/m²     General appearance: Seems comfortable, no acute distress. Eyes: Conjunctivae pink, reactive pupils. Neck: Trachea midline, thyroid normal.   Lungs:  Non labored breathing, CTA to anterior auscultation. Heart:  S1S2 normal, rub or gallop. ++ peripheral edema. Abdomen: Soft, non-tender, no organomegaly. Skin: No lesions or rashes, warm to touch.        DATA:    CBC:   Lab Results   Component Value Date    WBC 8.5 07/07/2021    RBC 4.07 07/07/2021    HGB 11.5 07/07/2021    HCT 34.8 07/07/2021    MCV 85.3 07/07/2021    MCH 28.3 07/07/2021    MCHC 33.1 07/07/2021    RDW 16.6 07/07/2021     07/07/2021    MPV 8.3 07/07/2021     BMP:    Lab Results   Component Value Date     07/08/2021    K 2.6 07/08/2021    K 3.0 06/29/2021    CL 88 07/08/2021    CO2 34 07/08/2021    BUN 87 07/08/2021    LABALBU 3.7 07/06/2021    CREATININE 2.0 07/08/2021    CALCIUM 9.5 07/08/2021    GFRAA 31 07/08/2021    LABGLOM 26 07/08/2021    GLUCOSE 107 07/08/2021              Assessment/     Chronic Kidney Disease:  Stage IV  - No proteinuria.  Multiple episodes of SHAUNNA  - Highly variable depending on cardiac and volume status but overall worsening      Chronic Hypotension:  Impacts pressure natriuresis      Hyponatremia:  Hypervolemic / Due to CHF              On tolvaptan 3 days a week     Systolic Heart Failure:  EF = 25%              Worsening edema and says she had gained 30 lbs but on room air     Plan/   Continue IV Lasix drip     Metolazone 5 mg three times per week     PRN potassium replacement   Monitor serial labs

## 2021-07-08 NOTE — PROGRESS NOTES
Hospitalist Progress Note      PCP: Linda De La Rosa PA-C    Date of Admission: 7/2/2021      Brief HPI    60 y.o. female with multiple medical problems including but not limited to chronic combined systolic and diastolic heart failure EF 25%, DM type II, CAD status post CABG, COPD presented to emergency room with generalized weakness. .. The patient had just been discharged from [facility], admitted from 6/21/7/21 with acute decompensated combined systolic and diastolic heart failure complicated by hyponatremia, SHAUNNA, physical deconditioning. . Managed with Lasix drip. .. SNF was recommended at discharge but she declined. .. When she went home, she realized she was too weak to ambulate and called EMS that brought her to [facility]. Mick Parikh On presentation, vital signs are stable. Юлия Stewarts was clear. .  The patient is now agreeable to going to a skilled nursing facility for rehab    Subjective:     Pt reports SOB improving. Remains on RA.  Left knee pain fairly unchanged       Medications:  Reviewed    Infusion Medications    furosemide (LASIX) 1mg/ml infusion 20 mg/hr (07/08/21 0528)    dextrose      sodium chloride       Scheduled Medications    metoprolol tartrate  25 mg Oral BID    miconazole   Topical BID    metOLazone  5 mg Oral Q MWF    apixaban  5 mg Oral BID    aspirin EC  81 mg Oral Daily    atorvastatin  80 mg Oral Nightly    benztropine  1 mg Oral Nightly    budesonide-formoterol  2 puff Inhalation BID    busPIRone  30 mg Oral BID    clopidogrel  75 mg Oral Daily    gabapentin  300 mg Oral TID    lamoTRIgine  200 mg Oral BID    magnesium oxide  400 mg Oral Daily    pantoprazole  40 mg Oral BID    QUEtiapine  25 mg Oral Nightly    tiotropium  2 puff Inhalation Daily    insulin glargine  0.15 Units/kg Subcutaneous Nightly    insulin lispro  0.05 Units/kg Subcutaneous TID WC    insulin lispro  0-6 Units Subcutaneous TID WC    insulin lispro  0-3 Units Subcutaneous Nightly     PRN Meds: povidone-iodine, glucose, dextrose, glucagon (rDNA), dextrose, sodium chloride flush, sodium chloride, ondansetron **OR** ondansetron, acetaminophen **OR** acetaminophen      Intake/Output Summary (Last 24 hours) at 7/8/2021 1047  Last data filed at 7/8/2021 1015  Gross per 24 hour   Intake 285 ml   Output 4975 ml   Net -4690 ml       Exam:    /68   Pulse 75   Temp 97.8 °F (36.6 °C) (Axillary)   Resp 20   Ht 5' 4\" (1.626 m)   Wt 273 lb 5.9 oz (124 kg)   LMP 10/24/2012   SpO2 91%   BMI 46.92 kg/m²     General appearance: No apparent distress, appears stated age and cooperative. sleepy  HEENT: Pupils equal, round, and reactive to light. Conjunctivae/corneas clear. Neck: Supple, with full range of motion. No jugular venous distention. Trachea midline. Respiratory:  Normal respiratory effort. Clear to auscultation, bilaterally without Rales/Wheezes/Rhonchi. Cardiovascular: Regular rate and rhythm with normal S1/S2 without murmurs, rubs or gallops. Abdomen: Soft, non-tender, non-distended with normal bowel sounds. Musculoskeletal: No clubbing, cyanosis. bilat pedal edema improved. Left  knee edematous with limited ROM in rt knee due to pain, no differential warmth noted   Skin: warm and dry. Neurologic:  Alert, speech clear with no overt facial droop  Psychiatric:  Awake and oriented. Labs:   Recent Labs     07/07/21  0808   WBC 8.5   HGB 11.5*   HCT 34.8*        Recent Labs     07/06/21  0657 07/07/21  0730 07/08/21  0628   * 132* 137   K 2.9* 2.8* 2.6*   CL 91* 87* 88*   CO2 28 29 34*   BUN 89* 84* 87*   CREATININE 2.4* 2.3* 2.0*   CALCIUM 9.4 9.4 9.5   PHOS 5.4*  --   --      No results for input(s): AST, ALT, BILIDIR, BILITOT, ALKPHOS in the last 72 hours. No results for input(s): INR in the last 72 hours. No results for input(s): Roula Lowers in the last 72 hours.     Assessment/Plan:      -Physical deconditioning/generalized weakness due to CHF--- needs SNF for rehab     - Acute on Chronic combined systolic and diastolic heart failure, EF 25% on echo 4/25-patient recently discharged from [facility] for acute decompensation. Nephro consulted for vol status management. On IV lasix ggt, metolazone. Monitor strict I's and O's , daily weights.       -Coronary artery disease status post CABG--stable--continue aspirin, Plavix, statin, beta-blocker        -Hyponatremia -hypervolemic due to CHF. .. Sodium improved from recent admission. Tolvaptan d/shira per nephro. Monitor sodium levels.          Hypokalemia : likely due to diuretics. Monitoring and replacing as needed per nephro recs      -CKD stage IV with recent SHAUNNA--. Nephro assisting. Monitor and  avoid nephrotoxic medications        -Obstructive sleep apnea-not on CPAP use--patient cannot afford it     -DM type II, insulin independent, uncontrolled hemoglobin A1c 11.9-continue home regimen     -VLMD-sarcna-xrnswhmu inhaled steroids bronchodilators     -Obesity with BMI 45--- continue modified diet for weight loss     -Chronic venous insufficiency with stasis dermatitis venous ulcers-continue diuretics and Ace wraps     -Peripheral vascular disease status post prior angioplasty of multiple vessels-including right renal artery , right SFA . Status post right femoropopliteal bypass -continue antiplatelets and statin    -Paroxysmal atrial flutter, fibrillation--stable-continue Eliquis and not peripheral     -Acute urinary retention 7/3/21-- improved with Roche placement-- Continue roche  for now for strict UOP monitoring given acute CHF. Anxiety: mood stable. Continue lamictal, buspar, benztropine and seroquel. Left knee pain ( not right knee) : unclear cause. Xray to eval.     DVT Prophylaxis: Eliquis  Diet: ADULT DIET; Regular; 4 carb choices (60 gm/meal); 1000 ml  Code Status: Full Code    Disposition-- likely when euvolemic. Pt on IV diuresis likely to continue through weekend per nephro.           Canelo Bess MD

## 2021-07-08 NOTE — CARE COORDINATION
Per discussion with nursing at 1100 huddle, pt is still on lasix gtt. Discharge plan is for skilled stay at [facility]. precert expires tomorrow. Will likely NOT be ready for discharge before the weekend. CM team will continue to follow.
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
## 2021-07-09 NOTE — PROGRESS NOTES
Hospitalist Progress Note      PCP: Gladys Dewitt PA-C    Date of Admission: 7/2/2021      Brief HPI    60 y.o. female with multiple medical problems including but not limited to chronic combined systolic and diastolic heart failure EF 25%, DM type II, CAD status post CABG, COPD presented to emergency room with generalized weakness. .. The patient had just been discharged from [facility], admitted from 6/21/7/21 with acute decompensated combined systolic and diastolic heart failure complicated by hyponatremia, SHAUNNA, physical deconditioning. . Managed with Lasix drip. .. SNF was recommended at discharge but she declined. .. When she went home, she realized she was too weak to ambulate and called EMS that brought her to [facility]. Ángela Conklin On presentation, vital signs are stable. Chino Wong was clear. .  The patient is now agreeable to going to a skilled nursing facility for rehab    Subjective:     SOB improved with minimal left Knee pain today. Remains on lasix drip .  Potassium 2.4       Medications:  Reviewed    Infusion Medications    furosemide (LASIX) 1mg/ml infusion 20 mg/hr (07/09/21 0907)    dextrose      sodium chloride       Scheduled Medications    potassium chloride  10 mEq Intravenous Q1H    metoprolol tartrate  25 mg Oral BID    miconazole   Topical BID    metOLazone  5 mg Oral Q MWF    apixaban  5 mg Oral BID    aspirin EC  81 mg Oral Daily    atorvastatin  80 mg Oral Nightly    benztropine  1 mg Oral Nightly    budesonide-formoterol  2 puff Inhalation BID    busPIRone  30 mg Oral BID    clopidogrel  75 mg Oral Daily    gabapentin  300 mg Oral TID    lamoTRIgine  200 mg Oral BID    magnesium oxide  400 mg Oral Daily    pantoprazole  40 mg Oral BID    QUEtiapine  25 mg Oral Nightly    tiotropium  2 puff Inhalation Daily    insulin glargine  0.15 Units/kg Subcutaneous Nightly    insulin lispro  0.05 Units/kg Subcutaneous TID WC    insulin lispro  0-6 Units Subcutaneous TID WC  insulin lispro  0-3 Units Subcutaneous Nightly     PRN Meds: povidone-iodine, glucose, dextrose, glucagon (rDNA), dextrose, sodium chloride flush, sodium chloride, ondansetron **OR** ondansetron, acetaminophen **OR** acetaminophen      Intake/Output Summary (Last 24 hours) at 7/9/2021 1048  Last data filed at 7/9/2021 0936  Gross per 24 hour   Intake 900 ml   Output 3850 ml   Net -2950 ml       Exam:    /70   Pulse 85   Temp 97.6 °F (36.4 °C) (Oral)   Resp 16   Ht 5' 4\" (1.626 m)   Wt 273 lb 5.9 oz (124 kg)   LMP 10/24/2012   SpO2 94%   BMI 46.92 kg/m²     General appearance: No apparent distress, appears stated age and cooperative. sleepy  HEENT: Pupils equal, round, and reactive to light. Conjunctivae/corneas clear. Neck: Supple, with full range of motion. No jugular venous distention. Trachea midline. Respiratory:  Normal respiratory effort. Clear to auscultation, bilaterally without Rales/Wheezes/Rhonchi. Cardiovascular: Regular rate and rhythm with normal S1/S2 without murmurs, rubs or gallops. Abdomen: Soft, non-tender, non-distended with normal bowel sounds. Musculoskeletal: No clubbing, cyanosis. ACE wraps on BLE. Left  knee edematous with limited ROM in rt knee due to pain, no differential warmth noted   Skin: warm and dry. Neurologic:  Alert, speech clear with no overt facial droop  Psychiatric:  Awake and oriented. Labs:   Recent Labs     07/07/21  0808   WBC 8.5   HGB 11.5*   HCT 34.8*        Recent Labs     07/07/21  0730 07/08/21  0628 07/09/21  0715   * 137 136   K 2.8* 2.6* 2.4*   CL 87* 88* 87*   CO2 29 34* 35*   BUN 84* 87* 88*   CREATININE 2.3* 2.0* 2.0*   CALCIUM 9.4 9.5 9.6     No results for input(s): AST, ALT, BILIDIR, BILITOT, ALKPHOS in the last 72 hours. No results for input(s): INR in the last 72 hours. No results for input(s): Marie Pace in the last 72 hours.     Assessment/Plan:      -Physical deconditioning/generalized weakness due to CHF--- needs SNF for rehab     - Acute on Chronic combined systolic and diastolic heart failure, EF 25% on echo 4/25-patient recently discharged from [facility] for acute decompensation. Nephro consulted for vol status management. On IV lasix ggt, metolazone. Monitor strict I's and O's , daily weights.       -Coronary artery disease status post CABG--stable--continue aspirin, Plavix, statin, beta-blocker        -Hyponatremia -hypervolemic due to CHF. .. Sodium improved from recent admission. Tolvaptan d/shira per nephro. Monitor sodium levels.        Hypokalemia : likely due to diuretics. Monitoring and replacing as needed per nephro recs      -CKD stage IV with recent SHAUNNA--. Nephro assisting. Monitor and  avoid nephrotoxic medications        -Obstructive sleep apnea-not on CPAP use--patient cannot afford it     -DM type II, insulin independent, uncontrolled hemoglobin A1c 11.9-continue home regimen     -GPVP-ntaxrn-dcreshuh inhaled steroids bronchodilators     -Obesity with BMI 45--- continue modified diet for weight loss     -Chronic venous insufficiency with stasis dermatitis venous ulcers-continue diuretics and Ace wraps     -Peripheral vascular disease status post prior angioplasty of multiple vessels-including right renal artery , right SFA . Status post right femoropopliteal bypass -continue antiplatelets and statin    -Paroxysmal atrial flutter, fibrillation--stable-continue Eliquis and not peripheral     -Acute urinary retention 7/3/21-- improved with Roche placement-- Continue roche  for now for strict UOP monitoring given acute CHF. Anxiety: mood stable. Continue lamictal, buspar, benztropine and seroquel. Left knee pain: Xray showed mild patellofemoral compartment osteoarthritis with small joint effusion. Pt with limited IV access- on lasix drip and not tolerating IV potassium per RN due to burning in her arm with infusion.  Discussed with nephro- no PICC line or midline recommended as she is a potential dialysis candidate. Central line to be possibly placed by IR today ordered. Discussed with IR, Dr. Edilberto Mandujano        DVT Prophylaxis: Eliquis  Diet: ADULT DIET; Regular; 4 carb choices (60 gm/meal); 1000 ml  Code Status: Full Code    Disposition-- likely when euvolemic. Pt on IV diuresis likely to continue through weekend per nephro.           Ernie Meraz MD

## 2021-07-09 NOTE — SEDATION DOCUMENTATION
Image guided central line insertion right IJ completed. Dr. Arjun Macias placed 7 Icelandic 16 cm triple lumen central line LOT # [id number] EXP 02/28/22 in the right IJ. Dressing clean, dry, and intact. Pt tolerated procedure without any signs or symptoms of distress. Vital signs stable. Report called to [facility]. Pt transported back to room in stable condition via bed by transport.      Medications  None    Vital Signs  Vitals:    07/09/21 1607   BP: 113/81   Pulse: 97   Resp: 24   Temp:    SpO2: 95%    (vital signs in table format)

## 2021-07-09 NOTE — PLAN OF CARE
Problem: Nutrition  Goal: Optimal nutrition therapy  Outcome: Ongoing  Note: Nutrition Problem #1: Increased nutrient needs  Intervention: Food and/or Nutrient Delivery: Continue Current Diet  Nutritional Goals: Consume 50% or greater of meals this admission

## 2021-07-09 NOTE — PROGRESS NOTES
Department of Internal Medicine  Nephrology Progress Note        SUBJECTIVE:    We are following this patient for CKD management. The patient was seen and examined; she feels well today with no CP, SOB, nausea or vomiting. ROS: No fever or chills. Social: No family at bedside. Physical Exam:    VITALS:  BP (!) 91/56   Pulse 80   Temp 98.3 °F (36.8 °C) (Oral)   Resp 16   Ht 5' 4\" (1.626 m)   Wt 273 lb 5.9 oz (124 kg)   LMP 10/24/2012   SpO2 94%   BMI 46.92 kg/m²     General appearance: Seems comfortable, no acute distress. Eyes: Conjunctivae pink, reactive pupils. Neck: Trachea midline, thyroid normal.   Lungs:  Non labored breathing, CTA to anterior auscultation. Heart:  S1S2 normal, rub or gallop. ++ peripheral edema. Abdomen: Soft, non-tender, no organomegaly. Skin: No lesions or rashes, warm to touch.        DATA:    CBC:   Lab Results   Component Value Date    WBC 8.5 07/07/2021    RBC 4.07 07/07/2021    HGB 11.5 07/07/2021    HCT 34.8 07/07/2021    MCV 85.3 07/07/2021    MCH 28.3 07/07/2021    MCHC 33.1 07/07/2021    RDW 16.6 07/07/2021     07/07/2021    MPV 8.3 07/07/2021     BMP:    Lab Results   Component Value Date     07/09/2021    K 2.4 07/09/2021    K 3.0 06/29/2021    CL 87 07/09/2021    CO2 35 07/09/2021    BUN 88 07/09/2021    LABALBU 3.7 07/06/2021    CREATININE 2.0 07/09/2021    CALCIUM 9.6 07/09/2021    GFRAA 31 07/09/2021    LABGLOM 26 07/09/2021    GLUCOSE 117 07/09/2021              Assessment/     Chronic Kidney Disease:  Stage IV  - No proteinuria.  Multiple episodes of SHAUNNA  - Highly variable depending on cardiac and volume status but overall worsening      Chronic Hypotension:  Impacts pressure natriuresis      Hyponatremia:  Hypervolemic / Due to CHF              On tolvaptan 3 days a week     Systolic Heart Failure:  EF = 25%              Worsening edema and says she had gained 30 lbs but on room air     Plan/   Continue IV Lasix drip     Metolazone 5 mg three times per week     PRN potassium replacement   Monitor serial labs

## 2021-07-09 NOTE — PROGRESS NOTES
Occupational Therapy   Treatment Attempt Note      Attempted OT treatment this date, however pt with critical K+ of 2.4 and RN requests to hold tx. Will re-attempt next date as schedule permits and pt medically stable.      Virginia Weston, OTR/L

## 2021-07-09 NOTE — PROGRESS NOTES
Brief Postoperative Note    Alexis Soares  YOB: 1965  [id number]    Pre-operative Diagnosis: hypokalemia    Post-operative Diagnosis: Same    Procedure: US and fluoroscopic guided CVC placement    Anesthesia: Local    Surgeons/Assistants: Mia Fuentes MD    Estimated Blood Loss: less than 5    Complications: None    Specimens: Was Not Obtained    Findings: CVC catheter in the superior cavoatrial junction.     Electronically signed by Mia Fuentes MD on 7/9/2021 at 4:07 PM

## 2021-07-09 NOTE — SEDATION DOCUMENTATION
Pt to IR for central line placement. Dr Sally Cid at bedside to speak with pt. Risks and benefits explained to pt. Pt denies questions and is agreeable to proceed. Consent signed and verified. Pt placed on the IR table and cardiac monitor placed.

## 2021-07-09 NOTE — PROGRESS NOTES
Comprehensive Nutrition Assessment    Type and Reason for Visit:  Initial, RD Nutrition Re-Screen/LOS    Nutrition Recommendations/Plan:   1. Continue carb control diet with 1000 mL FR   2. Encourage PO intakes   3. Monitor nutrition adequacy, pertinent labs, bowel habits, wt changes, and clinical progress    Nutrition Assessment:  59 yo female admitted with generalized weakness. Currently ordered carb control diet with 1000 mL FR. Pt reports poor appetite, but PO intakes of % per EMR. Discussed importance of nutrition to maintain LBM and energy. Pt denied any N/V/D/C. Encouraged PO intakes as tolerated. Declined ONS. Will continue to monitor. Malnutrition Assessment:  Malnutrition Status: At risk for malnutrition (Comment)      Estimated Daily Nutrient Needs:  Energy (kcal):  5421-9190 kcal; Weight Used for Energy Requirements:  Ideal (55 kg)     Protein (g):  66-83 g; Weight Used for Protein Requirements:  Ideal (1.2-1.5 g/kg)        Fluid (ml/day):  1000 mL FR;     Nutrition Related Findings:  BM x2 on 7/5. Na 136. K 2.4 today. IV lasix continue. Wounds:  Diabetic Ulcer, Venous Stasis       Current Nutrition Therapies:    ADULT DIET;  Regular; 4 carb choices (60 gm/meal); 1000 ml    Anthropometric Measures:  · Height: 5' 4\" (162.6 cm)  · Current Body Weight: 273 lb (123.8 kg)   · Admission Body Weight: 275 lb (124.7 kg)    · Ideal Body Weight: 120 lbs; % Ideal Body Weight 227.5 %   · BMI: 46.8  · BMI Categories: Obese Class 3 (BMI 40.0 or greater)       Nutrition Diagnosis:   · Increased nutrient needs related to increase demand for energy/nutrients as evidenced by BMI (extended hospital stay)      Nutrition Interventions:   Food and/or Nutrient Delivery:  Continue Current Diet  Nutrition Education/Counseling:  No recommendation at this time   Coordination of Nutrition Care:  Continue to monitor while inpatient    Goals:  Consume 50% or greater of meals this admission       Nutrition Monitoring and Evaluation:   Behavioral-Environmental Outcomes:  None Identified   Food/Nutrient Intake Outcomes:  Food and Nutrient Intake  Physical Signs/Symptoms Outcomes:  Biochemical Data, Nutrition Focused Physical Findings, Weight     Discharge Planning:    Continue current diet     Electronically signed by Kavitha Kuhn MS, RD, LD on 7/9/21 at 2:09 PM EDT    Contact: [phone]

## 2021-07-09 NOTE — CARE COORDINATION
Chart reviewed- per Nephrology- pt to continue on Lasix gtt. Cert for admit to [facility] AND [facility] will  today- call placed to Admissions to notify them. CM will continue to follow.   Buddy Gomez RN

## 2021-07-10 NOTE — PROGRESS NOTES
0.15 Units/kg Subcutaneous Nightly    insulin lispro  0.05 Units/kg Subcutaneous TID WC    insulin lispro  0-6 Units Subcutaneous TID WC    insulin lispro  0-3 Units Subcutaneous Nightly     PRN Meds: magnesium sulfate, potassium chloride, povidone-iodine, glucose, dextrose, glucagon (rDNA), dextrose, sodium chloride flush, sodium chloride, ondansetron **OR** ondansetron, acetaminophen **OR** acetaminophen      Intake/Output Summary (Last 24 hours) at 7/10/2021 1123  Last data filed at 7/10/2021 1012  Gross per 24 hour   Intake 1175 ml   Output 5525 ml   Net -4350 ml       Exam:    /75   Pulse 95   Temp 97.8 °F (36.6 °C) (Oral)   Resp 18   Ht 5' 4\" (1.626 m)   Wt 255 lb 11.7 oz (116 kg)   LMP 10/24/2012   SpO2 94%   BMI 43.90 kg/m²     General appearance: No apparent distress, appears stated age and cooperative. sleepy  HEENT: Pupils equal, round, and reactive to light. Conjunctivae/corneas clear. Neck: Supple, with full range of motion. No jugular venous distention. Trachea midline. Rt IJ CVC in place- blood in dressing  Respiratory:  Normal respiratory effort. Clear to auscultation, bilaterally without Rales/Wheezes/Rhonchi. Cardiovascular: Regular rate and rhythm with normal S1/S2 without murmurs, rubs or gallops. Abdomen: Soft, non-tender, non-distended with normal bowel sounds. Musculoskeletal: No clubbing, cyanosis. ACE wraps on BLE. Left  knee edematous with limited ROM in rt knee due to pain, no differential warmth noted   Skin: warm and dry. Neurologic:  Alert, speech clear with no overt facial droop  Psychiatric:  Awake and oriented. Labs:   No results for input(s): WBC, HGB, HCT, PLT in the last 72 hours.   Recent Labs     07/09/21  0715 07/09/21  0715 07/09/21  1950 07/10/21  0232 07/10/21  0556     --  135*  --  136   K 2.4*   < > 2.5* 3.3* 2.8*   CL 87*  --  85*  --  86*   CO2 35*  --  37*  --  39*   BUN 88*  --  80*  --  83*   CREATININE 2.0*  --  1.9*  --  2.0* CALCIUM 9.6  --  9.7  --  9.8    < > = values in this interval not displayed. No results for input(s): AST, ALT, BILIDIR, BILITOT, ALKPHOS in the last 72 hours. No results for input(s): INR in the last 72 hours. No results for input(s): [place] in the last 72 hours. Assessment/Plan:      -Physical deconditioning/generalized weakness due to CHF--- needs SNF for rehab     - Acute on Chronic combined systolic and diastolic heart failure, EF 25% on echo 4/25-patient recently discharged from [facility] for acute decompensation. Nephro consulted for vol status management. On IV lasix ggt, metolazone. Monitor strict I's and O's , daily weights.       -Coronary artery disease status post CABG--stable--continue aspirin, Plavix, statin, beta-blocker        -Hyponatremia -hypervolemic due to CHF. .. Sodium improved from recent admission. Tolvaptan d/shira per nephro. Monitor sodium levels.        Hypokalemia : likely due to diuretics. Monitoring and replacing as needed per nephro recs. Mag normal      -CKD stage IV with recent SHAUNNA--. Nephro assisting. Monitor and  avoid nephrotoxic medications        -Obstructive sleep apnea- not compliant with CPAP        -DM type II, insulin independent, uncontrolled hemoglobin A1c 11.9-continue lantus and SSI     -GDUX-uayjnt-zyralvjf inhaled steroids bronchodilators     -Obesity with BMI 45--- continue modified diet for weight loss     -Chronic venous insufficiency with stasis dermatitis venous ulcers-continue diuretics and Ace wraps     -Peripheral vascular disease status post prior angioplasty of multiple vessels-including right renal artery , right SFA . Status post right femoropopliteal bypass -continue antiplatelets and statin    -Paroxysmal atrial flutter, fibrillation--currently in sinus rhythm and rate controlled. continue BB, Eliquis.  Monitor on tele     -Acute urinary retention 7/3/21-- improved with Roche placement-- Continue roche  for now for strict UOP monitoring given acute CHF. Anxiety: mood stable. Continue lamictal, buspar, benztropine and seroquel. Left knee pain: Xray showed mild patellofemoral compartment osteoarthritis with small joint effusion. Improved. Pain control PRN            DVT Prophylaxis: Eliquis  Diet: ADULT DIET; Regular; 4 carb choices (60 gm/meal); 1000 ml  Code Status: Full Code    Disposition-- likely when euvolemic. Pt on IV diuresis likely to continue through weekend per nephro.           Payal Akers MD

## 2021-07-10 NOTE — PLAN OF CARE
Problem: OXYGENATION/RESPIRATORY FUNCTION  Goal: Patient will maintain patent airway  Outcome: Ongoing    Patient's EF (Ejection Fraction) is less than 40%    Heart Failure Medications:  Diuretics[de-identified] Furosemide    (One of the following REQUIRED for EF <40%/SYSTOLIC FAILURE but MAY be used in EF% >40%/DIASTOLIC FAILURE)        ACE[de-identified] None        ARB[de-identified] None         ARNI[de-identified] None    (Beta Blockers)  NON- Evidenced Based Beta Blocker (for EF% >40%/DIASTOLIC FAILURE): Metoprolol TARTrate- Lopressor    Evidenced Based Beta Blocker::(REQUIRED for EF% <40%/SYSTOLIC FAILURE) None  . .................................................................................................................................................. Patient's weights and intake/output reviewed: Yes    Patient's Last Weight: 273 lbs obtained by bed scale. Difference of 2 lbs more than last documented weight. Intake/Output Summary (Last 24 hours) at 7/10/2021 0053  Last data filed at 7/9/2021 2047  Gross per 24 hour   Intake 1255 ml   Output 5225 ml   Net -3970 ml       Comorbidities Reviewed Yes    Patient has a past medical history of Acute blood loss anemia, Acute kidney injury ([address] Acute kidney injury superimposed on CKD ([address] Acute on chronic combined systolic and diastolic congestive heart failure ([address] Acute on chronic right-sided heart failure ([address] Alcohol dependence ([facility] [address] Arthritis, Asthma, CAD (coronary artery disease), Cellulitis, COPD (chronic obstructive pulmonary disease) ([address] Diabetic ulcer of left foot associated with type 2 diabetes mellitus, limited to breakdown of skin ([address] Diabetic ulcer of toe of right foot associated with type 2 diabetes mellitus ([address] Left renal artery stenosis ([address] MI (myocardial infarction) ([address] Positive FIT (fecal immunochemical test), 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      >>For CHF and Comorbidity documentation on Education Time and Topics, please see Education Tab    Progressive Mobility Assessment:  What is this patient's Current Level of Mobility?: Requires Bed Rest  How was this patient Mobilized today?: Unable to Mobilize                 With Whom? Nurse, PCA, PT, and OT                 Level of Difficulty/Assistance: 2x Assist     Pt resting in bed at this time on room air. Pt denies shortness of breath. Pt with pitting lower extremity edema. Patient and/or Family's stated Goal of Care this Admission: reduce shortness of breath, increase activity tolerance, better understand heart failure and disease management, be more comfortable, and reduce lower extremity edema prior to discharge    The patient will demonstrate an understanding of s/s & treatment of hyperglycemia, by verbalization,  with the goal of completion at discharge.

## 2021-07-10 NOTE — PROGRESS NOTES
Dressing change to BLEs completed at this time as per wound care orders. Pt tolerated without complaint.

## 2021-07-10 NOTE — PROGRESS NOTES
Department of Internal Medicine  Nephrology Progress Note        SUBJECTIVE:    We are following this patient for CKD management. The patient was seen and examined; she feels well today with no CP, SOB, nausea or vomiting. ROS: No fever or chills. Social: No family at bedside. Physical Exam:    VITALS:  /75   Pulse 95   Temp 97.8 °F (36.6 °C) (Oral)   Resp 18   Ht 5' 4\" (1.626 m)   Wt 255 lb 11.7 oz (116 kg)   LMP 10/24/2012   SpO2 94%   BMI 43.90 kg/m²     General appearance: Seems comfortable, no acute distress. Eyes: Conjunctivae pink, reactive pupils. Neck: Trachea midline, thyroid normal.   Lungs:  Non labored breathing, CTA to anterior auscultation. Heart:  S1S2 normal, rub or gallop. ++ peripheral edema. Abdomen: Soft, non-tender, no organomegaly. Skin: No lesions or rashes, warm to touch.        DATA:    CBC:   Lab Results   Component Value Date    WBC 8.5 07/07/2021    RBC 4.07 07/07/2021    HGB 11.5 07/07/2021    HCT 34.8 07/07/2021    MCV 85.3 07/07/2021    MCH 28.3 07/07/2021    MCHC 33.1 07/07/2021    RDW 16.6 07/07/2021     07/07/2021    MPV 8.3 07/07/2021     BMP:    Lab Results   Component Value Date     07/10/2021    K 2.8 07/10/2021    K 3.3 07/10/2021    CL 86 07/10/2021    CO2 39 07/10/2021    BUN 83 07/10/2021    LABALBU 3.7 07/06/2021    CREATININE 2.0 07/10/2021    CALCIUM 9.8 07/10/2021    GFRAA 31 07/10/2021    LABGLOM 26 07/10/2021    GLUCOSE 121 07/10/2021              Assessment/     Chronic Kidney Disease:  Stage IV  - No proteinuria.  Multiple episodes of SHAUNNA  - Highly variable depending on cardiac and volume status but overall worsening      Chronic Hypotension:  Impacts pressure natriuresis      Hyponatremia:  Hypervolemic / Due to CHF              On tolvaptan 3 days a week     Systolic Heart Failure:  EF = 25%              Worsening edema and says she had gained 30 lbs but on room air     Plan/   Decrease IV Lasix drip to 10 mg/h Metolazone 5 mg three times per week     PRN potassium replacement   Monitor serial labs

## 2021-07-10 NOTE — PROGRESS NOTES
Pt requested assistance sitting up on side of bed, upon entering room pt found to have sat up in bed with family assistance, at which time pt accidentally kicked bed frame with left foot. Foot lightly bleeding through dressing, dressing reinforced, bleeding stopped. Foot elevated. Pt denies pain. 1700 drsg to left lower extremity changed; ABD pad added for protection/bleeding.

## 2021-07-11 NOTE — PROGRESS NOTES
07/11/21 0401   NIV Type   Skin Protection for O2 Device Yes   Location Nose   Equipment Type v60   Mode Bilevel   Mask Type Full face mask   Mask Size Medium   Settings/Measurements   IPAP 16 cmH20   CPAP/EPAP 6 cmH2O   Rate Ordered 16   Resp 19   FiO2  40 %   Vt Exhaled 512 mL   Minute Volume 9.2 Liters   Mask Leak (lpm) 9 lpm   Comfort Level Good   Using Accessory Muscles No   SpO2 99   Alarm Settings   Alarms On Y   Press Low Alarm 6 cmH2O   High Pressure Alarm 30 cmH2O   Apnea (secs) 20 secs   Resp Rate Low Alarm 6   High Respiratory Rate 40 br/min

## 2021-07-11 NOTE — PROGRESS NOTES
Department of Internal Medicine  Nephrology Progress Note        SUBJECTIVE:    We are following this patient for CKD management. The patient was seen and examined; she was resting comfortably with no acute events noted overnight. ROS: No fever or chills. Social: No family at bedside. Physical Exam:    VITALS:  /73   Pulse 83   Temp 98 °F (36.7 °C) (Oral)   Resp 18   Ht 5' 4\" (1.626 m)   Wt 256 lb 2.8 oz (116.2 kg)   LMP 10/24/2012   SpO2 98%   BMI 43.97 kg/m²     General appearance: Seems comfortable, no acute distress. Eyes: Conjunctivae pink, reactive pupils. Neck: Trachea midline, thyroid normal.   Lungs:  Non labored breathing, CTA to anterior auscultation. Heart:  S1S2 normal, rub or gallop. ++ peripheral edema. Abdomen: Soft, non-tender, no organomegaly. Skin: No lesions or rashes, warm to touch.        DATA:    CBC:   Lab Results   Component Value Date    WBC 8.5 07/07/2021    RBC 4.07 07/07/2021    HGB 11.5 07/07/2021    HCT 34.8 07/07/2021    MCV 85.3 07/07/2021    MCH 28.3 07/07/2021    MCHC 33.1 07/07/2021    RDW 16.6 07/07/2021     07/07/2021    MPV 8.3 07/07/2021     BMP:    Lab Results   Component Value Date     07/11/2021    K 3.0 07/11/2021    CL 87 07/11/2021    CO2 40 07/11/2021    BUN 84 07/11/2021    LABALBU 3.7 07/06/2021    CREATININE 2.0 07/11/2021    CALCIUM 9.7 07/11/2021    GFRAA 31 07/11/2021    LABGLOM 26 07/11/2021    GLUCOSE 112 07/11/2021              Assessment/     Chronic Kidney Disease:  Stage IV  - No proteinuria.  Multiple episodes of SHAUNNA  - Highly variable depending on cardiac and volume status but overall worsening      Chronic Hypotension:  Impacts pressure natriuresis      Hyponatremia:  Hypervolemic / Due to CHF              On tolvaptan 3 days a week     Systolic Heart Failure:  EF = 25%              Worsening edema and says she had gained 30 lbs but on room air     Plan/   Discontinue IV Lasix   Start Torsemide 100 mg daily Metolazone 5 mg three times per week     PRN potassium replacement   Monitor serial labs

## 2021-07-11 NOTE — PROGRESS NOTES
Pt's right intrajugular access dressing becoming saturated at this time, drsg change completed. Per night RN verbal report, pt required drsg change during HS; for total of 4 drsg changes due to bleeding since line placement on Friday 7/9. Pt's left foot drsg with evidence of slow bleeding overnight s/p accidental self injury while attempting to sit on side of bed sans staff assist. Drsg change completed as per wound care orders, with ABD pad. Pt's hospitalist provider aware, plavix and eliquis held. Continuing to monitor drsg, VS, labs. Platelets 504, H/H 96.7/86. 8.

## 2021-07-11 NOTE — PROGRESS NOTES
Hospitalist Progress Note      PCP: Ketan Kc PA-C    Date of Admission: 7/2/2021      Brief HPI    60 y.o. female with multiple medical problems including but not limited to chronic combined systolic and diastolic heart failure EF 25%, DM type II, CAD status post CABG, COPD presented to emergency room with generalized weakness. .. The patient had just been discharged from [facility] [place], admitted from 6/21/7/21 with acute decompensated combined systolic and diastolic heart failure complicated by hyponatremia, SHAUNNA, physical deconditioning. . Managed with Lasix drip. .. SNF was recommended at discharge but she declined. .. When she went home, she realized she was too weak to ambulate and called EMS that brought her to [facility]. Austyn Sinha On presentation, vital signs are stable. Tonya Dawson was clear. .  The patient is now agreeable to going to a skilled nursing facility for rehab    Subjective:       Pt was hypoxic in 70's while sleeping last night and had BIPAP placed which she used for about 4 hours then refused to continue BIPAP per RN. Currently on 1L 02. Pt reports she felt SOB last night but much improved today. Has mild bleeding at  rt IJ CVC site and left  foot after she had hit her foot on her bed yesterday.  Pt on eliquis, aspirin and plavix    Medications:  Reviewed    Infusion Medications    furosemide (LASIX) 1mg/ml infusion 10 mg/hr (07/10/21 1144)    dextrose      sodium chloride       Scheduled Medications    metoprolol tartrate  25 mg Oral BID    miconazole   Topical BID    metOLazone  5 mg Oral Q MWF    apixaban  5 mg Oral BID    aspirin EC  81 mg Oral Daily    atorvastatin  80 mg Oral Nightly    benztropine  1 mg Oral Nightly    budesonide-formoterol  2 puff Inhalation BID    busPIRone  30 mg Oral BID    clopidogrel  75 mg Oral Daily    gabapentin  300 mg Oral TID    lamoTRIgine  200 mg Oral BID    magnesium oxide  400 mg Oral Daily    pantoprazole  40 mg Oral BID    input(s): AST, ALT, BILIDIR, BILITOT, ALKPHOS in the last 72 hours. No results for input(s): INR in the last 72 hours. No results for input(s): Jaden Oiler in the last 72 hours. Assessment/Plan:      -Physical deconditioning/generalized weakness due to CHF--- needs SNF for rehab     - Acute on Chronic combined systolic and diastolic heart failure, EF 25% on echo 4/25-patient recently discharged from [address] [place] [address] for acute decompensation. Nephro consulted for vol status management. On IV lasix ggt, metolazone. Monitor strict I's and O's , daily weights.       -Coronary artery disease status post CABG--stable--continue aspirin, Plavix, statin, beta-blocker        -Hyponatremia -hypervolemic due to CHF. .. Sodium improved from recent admission. Tolvaptan d/shira per nephro. Monitor sodium levels.        Hypokalemia : likely due to diuretics. Monitoring and replacing as needed per nephro recs. Mag was  normal      -CKD stage IV with recent SHAUNNA--. Nephro assisting. Monitor and  avoid nephrotoxic medications        Acute resp failure : hypoxic overnight with ABG reviewed -metabolic alkalosis with mild hypercapnia. Likely due to underlying CLINT. Improved with BIPAP which she later refused currently on 1L. Check CXR. Continue 02 and wean as tolerated. Encouraged use of BIPAP with naps and sleep if pt agreeable.       -Obstructive sleep apnea- not compliant with CPAP        -DM type II, insulin independent, uncontrolled hemoglobin A1c 11.9-continue lantus and SSI     -GPVX-tlcgfn-oteqdeoa inhaled steroids bronchodilators     -Obesity with BMI 45--- continue modified diet for weight loss     -Chronic venous insufficiency with stasis dermatitis venous ulcers-continue diuretics and Ace wraps     -Peripheral vascular disease status post prior angioplasty of multiple vessels-including right renal artery , right SFA . Status post right femoropopliteal bypass -continue aspirin and statin. plavix held due to bleeding. -Paroxysmal atrial flutter, fibrillation--currently in sinus rhythm and rate controlled. continue BB. Hold eliquis due to mild bleeding at Rt IJ CVC site. Monitor on tele     -Acute urinary retention 7/3/21-- improved with Roche placement-- Continue roche  for now for strict UOP monitoring given acute CHF. Anxiety: mood stable. Was somnolent earlier this am per RN though pt awake at time of eval- hold lamictal   Continue  buspar, benztropine and seroquel for now and consider holding if daytime somnolence  Reoccurs. Also held gabapentin       Left knee pain: Xray showed mild patellofemoral compartment osteoarthritis with small joint effusion. Improved. Pain control PRN      Hold eliquis and plavix today due to bleeding from rt IJ CVC site- may restart tomorrow if bleeding improves or resolves. DVT Prophylaxis: hold eliquis  Diet: ADULT DIET; Regular; 4 carb choices (60 gm/meal); 1000 ml  Code Status: Full Code    Disposition-- likely when euvolemic Discussed with Dr Meron jaime Quiet . He plans to d/c lasix drip today.  Possible d/c to SNF in 1-2 days pending clinical course           Christie Lawler MD

## 2021-07-11 NOTE — PROGRESS NOTES
Physical Therapy Attempt  Attempted PT follow up session this PM.  Wound care RN just began treating pt's wounds. Will follow up tomorrow.     Chely Lucas, PT, DPT #[id number]

## 2021-07-11 NOTE — PROGRESS NOTES
07/11/21 0213   NIV Type   $NIV $Daily Charge   Skin Assessment Clean, dry, & intact   Skin Protection for O2 Device Yes   Location Nose   NIV Started/Stopped On   Equipment Type v60   Mode Bilevel   Mask Type Full face mask   Mask Size Medium   Settings/Measurements   IPAP 16 cmH20   CPAP/EPAP 6 cmH2O   Rate Ordered 16   Resp 23   FiO2  40 %   Vt Exhaled 508 mL   Minute Volume 10.5 Liters   Mask Leak (lpm) 0 lpm   Comfort Level Good   Using Accessory Muscles No   SpO2 98   Alarm Settings   Alarms On Y   Press Low Alarm 6 cmH2O   High Pressure Alarm 30 cmH2O   Apnea (secs) 20 secs   Resp Rate Low Alarm 6   High Respiratory Rate 40 br/min

## 2021-07-12 NOTE — PROGRESS NOTES
07/12/21 0002   NIV Type   $NIV $Daily Charge   Skin Protection for O2 Device Yes   Equipment Type v60   Mode Bilevel   Mask Type Full face mask   Mask Size Medium   Settings/Measurements   IPAP 16 cmH20   CPAP/EPAP 6 cmH2O   Rate Ordered 16   Resp 18   FiO2  35 %   I Time/ I Time % 0.9 s   Vt Exhaled 496 mL   Minute Volume 12 Liters   Mask Leak (lpm) 17 lpm   Comfort Level Good   Using Accessory Muscles No   SpO2 99   Alarm Settings   Alarms On Y   Press Low Alarm 6 cmH2O   High Pressure Alarm 30 cmH2O   Apnea (secs) 20 secs   Resp Rate Low Alarm 6   High Respiratory Rate 40 br/min        07/12/21 0002   NIV Type   $NIV $Daily Charge   Skin Protection for O2 Device Yes   Equipment Type v60   Mode Bilevel   Mask Type Full face mask   Mask Size Medium   Settings/Measurements   IPAP 16 cmH20   CPAP/EPAP 6 cmH2O   Rate Ordered 16   Resp 18   FiO2  35 %   I Time/ I Time % 0.9 s   Vt Exhaled 496 mL   Minute Volume 12 Liters   Mask Leak (lpm) 17 lpm   Comfort Level Good   Using Accessory Muscles No   SpO2 99   Alarm Settings   Alarms On Y   Press Low Alarm 6 cmH2O   High Pressure Alarm 30 cmH2O   Apnea (secs) 20 secs   Resp Rate Low Alarm 6   High Respiratory Rate 40 br/min

## 2021-07-12 NOTE — PROGRESS NOTES
Physical Therapy  Facility/Department: [facility] B3 - MED SURG  Daily Treatment Note  NAME: Nikole Potts  : 1965  MRN: [id number]    Date of Service: 2021    Discharge Recommendations:  Subacute/Skilled Nursing Facility   PT Equipment Recommendations  Equipment Needed: No  Other: Defer to next level of care    Assessment   Body structures, Functions, Activity limitations: Decreased functional mobility ; Decreased sensation; Increased pain;Decreased balance;Decreased ROM; Decreased strength;Decreased safe awareness;Decreased endurance  Assessment: Pt tolerated therapy well this date. Seen as co-treat with OT to safely progress functional mobility and maximize outcomes. Pt unable to stand to SW with max A x2. Max A x 2 to stand from EOB to johny stedy. Progressing to min A for static standing with johny stedy. Limited d/t knee pain with standing. RN notified of pt requesting pain medicine. Pt would benefit from continued skilled therapy to address deficits. Continue to recommend SNF at d/c. Treatment Diagnosis: Impaired functional mobility and balance  Specific instructions for Next Treatment: Progress mobility as tolerated  Prognosis: Good  Decision Making: Medium Complexity  PT Education: Goals; General Safety;PT Role;Disease Specific Education;Plan of Care; Functional Mobility Training;Pressure Relief;Precautions; Injury Prevention;Transfer Training;Energy Conservation;Equipment  Patient Education: Pt educated on importance of OOB exercise and participation in PT. Pt demonstrated understanding  Barriers to Learning: none  REQUIRES PT FOLLOW UP: Yes  Activity Tolerance  Activity Tolerance: Patient Tolerated treatment well;Patient limited by fatigue;Patient limited by endurance  Activity Tolerance: /70, HR 86, SpO2 92% with NC sitting next to pt in bed. Re-applied at start of session     Patient Diagnosis(es): The encounter diagnosis was Generalized weakness.      has a past medical history of Acute blood loss anemia, Acute kidney injury ([address] Acute kidney injury superimposed on CKD ([address] Acute on chronic combined systolic and diastolic congestive heart failure ([address] Acute on chronic right-sided heart failure ([address] Alcohol dependence ([address] Arthritis, Asthma, CAD (coronary artery disease), Cellulitis, COPD (chronic obstructive pulmonary disease) ([address] Diabetic ulcer of left foot associated with type 2 diabetes mellitus, limited to breakdown of skin ([address] Diabetic ulcer of toe of right foot associated with type 2 diabetes mellitus ([address] Left renal artery stenosis ([address] MI (myocardial infarction) ([address] Positive FIT (fecal immunochemical test), 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 has a past surgical history that includes Tonsillectomy; Cholecystectomy; tumor excision; Upper gastrointestinal endoscopy (4/25/2013); Upper gastrointestinal endoscopy (12/10/2015); Upper gastrointestinal endoscopy (01/06/2017); Arterial bypass surgry (Left, 01/06/2016); Cardiac catheterization (03/27/2018); Coronary angioplasty with stent (03/16/2018); Rotator cuff repair (Left, 04/22/2016); Coronary angioplasty with stent (01/03/2018); Insertable Cardiac Monitor (02/14/2019); femoral bypass (Right, 5/16/2019); transluminal angioplasty (Left, 10/08/2019); Cardiac catheterization (01/20/2020); Coronary artery bypass graft (N/A, 1/27/2020); vascular surgery (Left, 12/08/2015); transluminal angioplasty (Left, 04/29/2020); vascular surgery (Bilateral, 07/07/2020); Coronary angioplasty with stent (10/07/2019); transesophageal echocardiogram (01/26/2020); vascular surgery (Left, 08/04/2020); vascular surgery (Left, 08/05/2020); vascular surgery (Left, 09/01/2020); and IR NONTUNNELED VASCULAR CATHETER > 5 YEARS (7/9/2021).     Restrictions  Restrictions/Precautions  Restrictions/Precautions: General Precautions, Fall Risk, Up as Tolerated  Required Braces or Orthoses?: No  Position Activity Restriction  Other position/activity restrictions: rhonda roche  Subjective   General  Chart Reviewed: Yes  Additional Pertinent Hx: Per Dr. Chitra Jacinto H&P \"62 y.o. female with multiple medical problems including but not limited to chronic combined systolic and diastolic heart failure EF 25%, DM type II, CAD status post CABG, COPD presented to emergency room with generalized weakness\"  Response To Previous Treatment: Patient with no complaints from previous session. Family / Caregiver Present: No  Referring Practitioner: Nesha Claros MD  Subjective  Subjective: Pt supine in bed on approach, agreeable to PT tx  General Comment  Comments: RN cleared pt for session  Pain Screening  Patient Currently in Pain: Yes  Pain Assessment  Pain Assessment: 0-10  Pain Level: 9  Pain Type: Acute pain  Pain Location: Knee  Pain Orientation: Left  Non-Pharmaceutical Pain Intervention(s): Ambulation/Increased Activity;Repositioned  Response to Pain Intervention: Patient Satisfied       Objective   Bed mobility  Supine to Sit: Moderate assistance (HOB elevated, use of rails, cues for technique)  Sit to Supine: Moderate assistance;2 Person assistance  Scooting: Minimal assistance; Moderate assistance (forward to EOB)     Transfers  Sit to Stand: 2 Person Assistance;Maximum Assistance  Stand to sit: 2 Person Assistance;Maximum Assistance  Comment: Unable to stand to SW with max A x 2. Max A x 2 to stand from EOB to johny stedy. Cues for safe hand placement. Decreased eccentric control with sitting        Balance  Comments: Static standing with johny stedy x 2 minutes.  PT facilitating balance/ posture and OT facilitating pericare     Exercises  Hip Flexion: Seated marches x 15 BLE  Knee Long Arc Quad: x 15 BLE  Ankle Pumps: x 15 BLE  Comments: Cues for sequence/technique                             AM-PAC Score  AM-PAC Inpatient Mobility Raw Score : 9 (07/12/21 1213)  AM-PAC Inpatient T-Scale Score : 30.55 (07/12/21 1213)  Mobility Inpatient CMS 0-100% Score: 81.38 (07/12/21 1213)  Mobility Inpatient CMS G-Code Modifier : CM (07/12/21 1213)          Goals  Short term goals  Time Frame for Short term goals: 1 week 7/11/21 (unless otherwise specified) -- GOALs extended to 7/19 d/t prolonged hospital stay  Short term goal 1: Pt will complete supine to/from sit with modA x 1   -7/12 Mod A  Short term goal 2: Pt will complete sit to/from stand with LRAD with Diana/modA x 2  -7/12 Max 2 johny stedy  Short term goal 3: Pt will complete bed <> chair with LRAD with Diana/modA x 2   -7/12 dependent w/ johny stedy  Short term goal 4: Pt will demonstrate static to dynamic standing x 1 minute with Diana x 2 without LOB   - GOAL MET 7/12  Short term goal 5: 7/08/21: Pt will participate in 12-15 reps BLE exercises to increase strength and increase I with functional mobility   - GOAL MET 7/12 and ongoing  Patient Goals   Patient goals : \"Go to rehab and get stronger\"    Plan    Plan  Times per week: 3-5x/wk  Times per day: Daily  Specific instructions for Next Treatment: Progress mobility as tolerated  Current Treatment Recommendations: Strengthening, Home Exercise Program, ROM, Safety Education & Training, Balance Training, Endurance Training, Patient/Caregiver Education & Training, Functional Mobility Training, Transfer Training, Gait Training, Positioning, Neuromuscular Re-education, Stair training, Wheelchair Mobility Training  Safety Devices  Type of devices: All fall risk precautions in place, Call light within reach, Bed alarm in place, Gait belt, Patient at risk for falls, Left in bed, Nurse notified     Therapy Time   Individual Concurrent Group Co-treatment   Time In       1025   Time Out       1050   Minutes       25   Timed Code Treatment Minutes: 72 Luke Mtz, PT, DPT  If pt is unable to be seen after this session, please let this note serve as discharge summary.   Please see case management note

## 2021-07-12 NOTE — PROGRESS NOTES
For patient safety, an AVAS[employer] camera has been placed in patient's room. AV[employer] monitoring needed for pullimg lines, Confusion, Increased Fall Risk, Pulling at Lines, Substance Withdrawal, Delirium, Elopement Risk and Potential episodes of aggression or violence. Writer placed call to monitor observer to verify camera number  and review patient name, reason for monitoring, and contact information for primary RN. Patient, Family/Healthcare Decision Maker, HCPOA and Legal Guardian notified of use of remote monitoring upon implementation of camera and verbalized understanding.

## 2021-07-12 NOTE — SIGNIFICANT EVENT
Rapid response was called, on arrival to the room patient was drenched in blood with the bed full of blood as well dripping to the floor. patient has taken off right CVC. Investigation to the area noted the central line was out but continued sutured. I took out the suture, and requested pressure dressing. Ordered H&H and type and screen.

## 2021-07-12 NOTE — PROGRESS NOTES
Occupational Therapy  Facility/Department: [facility] B3 - MED SURG  Daily Treatment Note  NAME: Dulce Nolan  : 1965  MRN: [id number]    Date of Service: 2021    Discharge Recommendations:  Subacute/Skilled Nursing Facility       Assessment   Performance deficits / Impairments: Decreased functional mobility ; Decreased ADL status; Decreased strength;Decreased safe awareness;Decreased cognition;Decreased balance;Decreased sensation;Decreased posture;Decreased endurance  Assessment: Pt seen for cotx with PT d/t assist level, limited activity tolerance and decreased cognition. Pt unable to stand with max A X2 to SW, unable to clear buttocks and reports fear of falling. Pt able to  [address] [place] [address] with max A x2 poor standing tolerance, as pt fatigues easily with min activity. Cont to recommend SNF at d/c.  OT Education: OT Role;Plan of Care;ADL Adaptive Strategies;Transfer Training;Energy Conservation;Equipment  Patient Education: disease specific: PLB, transfers  Barriers to Learning: cog  REQUIRES OT FOLLOW UP: Yes  Activity Tolerance  Activity Tolerance: Patient Tolerated treatment well;Patient limited by pain; Patient limited by fatigue  Activity Tolerance: 102/70 HR 85% O2 91% on 2.5L  Safety Devices  Safety Devices in place: Yes  Type of devices: Gait belt;Nurse notified;Call light within reach; Left in bed;Bed alarm in place         Patient Diagnosis(es): The encounter diagnosis was Generalized weakness.       has a past medical history of Acute blood loss anemia, Acute kidney injury ([address] Acute kidney injury superimposed on CKD ([address] Acute on chronic combined systolic and diastolic congestive heart failure ([address] Acute on chronic right-sided heart failure ([address] Alcohol dependence ([address] Arthritis, Asthma, CAD (coronary artery disease), Cellulitis, COPD (chronic obstructive pulmonary disease) ([address] Diabetic ulcer of left foot associated with type 2 diabetes mellitus, limited to breakdown of skin [facility]), Diabetic ulcer of toe of right foot associated with type 2 diabetes mellitus ([facility] Left renal artery stenosis ([facility] [address] [facility] MI (myocardial infarction) ([facility] [address] Positive FIT (fecal immunochemical test), 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 has a past surgical history that includes Tonsillectomy; Cholecystectomy; tumor excision; Upper gastrointestinal endoscopy (4/25/2013); Upper gastrointestinal endoscopy (12/10/2015); Upper gastrointestinal endoscopy (01/06/2017); Arterial bypass surgry (Left, 01/06/2016); Cardiac catheterization (03/27/2018); Coronary angioplasty with stent (03/16/2018); Rotator cuff repair (Left, 04/22/2016); Coronary angioplasty with stent (01/03/2018); Insertable Cardiac Monitor (02/14/2019); femoral bypass (Right, 5/16/2019); transluminal angioplasty (Left, 10/08/2019); Cardiac catheterization (01/20/2020); Coronary artery bypass graft (N/A, 1/27/2020); vascular surgery (Left, 12/08/2015); transluminal angioplasty (Left, 04/29/2020); vascular surgery (Bilateral, 07/07/2020); Coronary angioplasty with stent (10/07/2019); transesophageal echocardiogram (01/26/2020); vascular surgery (Left, 08/04/2020); vascular surgery (Left, 08/05/2020); vascular surgery (Left, 09/01/2020); and IR NONTUNNELED VASCULAR CATHETER > 5 YEARS (7/9/2021). Restrictions  Restrictions/Precautions  Restrictions/Precautions: General Precautions, Fall Risk, Up as Tolerated  Required Braces or Orthoses?: No  Position Activity Restriction  Other position/activity restrictions: rhonda roche  Subjective   General  Chart Reviewed: Yes  Patient assessed for rehabilitation services?: Yes  Family / Caregiver Present: No  Diagnosis: generalized weakness  Subjective  Subjective: Pt supine in bed upon arrival and agreeable to session.   General Comment  Comments: RN clears for therapy      Orientation  Orientation  Overall Orientation Status: Within Functional Limits  Objective    ADL  Grooming: Moderate assistance  LE Dressing: Dependent/Total (socks)  Toileting: Dependent/Total (incont BM)        Balance  Sitting Balance: Stand by assistance  Standing Balance: Dependent/Total (x2 in [facility])  Standing Balance  Time: <1 min x 2  Activity: standing in [facility]     Transfers  Sit to stand: 2 Person assistance;Maximum assistance  Stand to sit: Maximum assistance;2 Person assistance  Transfer Comments: attempted to stand to SW, pt unable to clear buttocks with max A x 2, mod/max A x 2 to  [facility]                       Cognition  Overall Cognitive Status: Exceptions  Arousal/Alertness: Delayed responses to stimuli  Following Commands: Follows one step commands consistently; Follows one step commands with increased time  Attention Span: Attends with cues to redirect  Memory: Appears intact  Safety Judgement: Decreased awareness of need for assistance  Problem Solving: Assistance required to generate solutions  Insights: Decreased awareness of deficits  Initiation: Requires cues for some  Sequencing: Requires cues for some                    Type of ROM/Therapeutic Exercise  Type of ROM/Therapeutic Exercise: AROM  Comment: semifowler's  Exercises  Scapular Protraction: x10  Scapular Retraction: x10  Shoulder Flexion: x10 to 90*  Elbow Flexion: x10  Elbow Extension: x10  Finger Flexion: x10  Finger Extension: x10                    Plan   Plan  Times per week: 3-5x/wk  Current Treatment Recommendations: Strengthening, Patient/Caregiver Education & Training, Equipment Evaluation, Education, & procurement, Balance Training, Functional Mobility Training, Endurance Training, Pain Management, Cognitive/Perceptual Training, Safety Education & Training, Self-Care / ADL    AM-[facility] Score        AM-[facility] Inpatient Daily Activity Raw Score: 12 (07/12/21 1221)  AM-PAC Inpatient ADL T-Scale Score : 30.6 (07/12/21 1221)  ADL Inpatient CMS 0-100% Score: 66.57 (07/12/21 1221)  ADL Inpatient CMS G-Code Modifier : CL (07/12/21 1221)    Goals  Short term goals  Time Frame for Short term goals: 1-2 weeks  Short term goal 1: Pt will complete toilet t/f with max A x1.-ongoing  Short term goal 2: Pt will complete UB ADLs with S/U.- ongoing mod A.-ongoing  Short term goal 3: Pt will tolerate standing x3 mins to increase participation in ADLs. - ongoing standing EOB 2 min 7/7.-ongoing  Short term goal 4: Pt will complete x3 sets x15 reps BU E therex to increase strength. by (GOAL MET 7/6- continue ). -ongoing  Patient Goals   Patient goals : get stronger, walk       Therapy Time   Individual Concurrent Group Co-treatment   Time In 1025         Time Out 1050         Minutes 25         Timed Code Treatment Minutes: 25 Minutes   If pt discharges prior to next session, this note will serve as discharge summary. See case management note for discharge disposition.         Soha Smart, OT

## 2021-07-12 NOTE — CARE COORDINATION
Chart reviewed by . Triggered by LOS - day 9. Pt remains on b3. Being followed by internal medicine and nephrology. Per nephrology, pt transitioned from IV lasix to oral torsemide yesterday. dispo in \"1-2 days\" per provider note. Precert has been started and approved 3 times this admission. Each time pt was not ready for discharge. CM will watch for provider notes to see when appropriate to re-initiate precert for [facility]. CM called and spoke with Blake Cid at [facility] with updates as to above.

## 2021-07-12 NOTE — PROGRESS NOTES
07/12/21 0327   NIV Type   Skin Protection for O2 Device Yes   Location Nose   Equipment Type [id number]   Mode Bilevel   Mask Type Full face mask   Mask Size Medium   Settings/Measurements   IPAP 16 cmH20   CPAP/EPAP 6 cmH2O   Rate Ordered 16   Resp 28   FiO2  30 %   I Time/ I Time % 0.9 s   Vt Exhaled 501 mL   Minute Volume 12.8 Liters   Mask Leak (lpm) 0 lpm   Comfort Level Good   Using Accessory Muscles No   SpO2 98   Alarm Settings   Alarms On Y   Press Low Alarm 6 cmH2O   High Pressure Alarm 30 cmH2O   Apnea (secs) 20 secs   Resp Rate Low Alarm 6   High Respiratory Rate 40 br/min

## 2021-07-12 NOTE — PROGRESS NOTES
07/11/21 2104   NIV Type   Skin Protection for O2 Device Yes   NIV Started/Stopped On   Mode Bilevel   Mask Type Full face mask   Mask Size Medium   Settings/Measurements   IPAP 16 cmH20   CPAP/EPAP 6 cmH2O   Rate Ordered 16   Resp 22   FiO2  35 %   I Time/ I Time % 0.9 s   Vt Exhaled 529 mL   Minute Volume 11.4 Liters   Mask Leak (lpm) 0 lpm   Using Accessory Muscles No   Breath Sounds   Right Upper Lobe Diminished   Right Middle Lobe Diminished   Right Lower Lobe Diminished   Left Upper Lobe Diminished   Left Lower Lobe Diminished   Alarm Settings   Alarms On Y

## 2021-07-12 NOTE — PROGRESS NOTES
Department of Internal Medicine  Nephrology Progress Note        SUBJECTIVE:    We are following this patient for CKD management. The patient was seen and examined; she is drowsy but answers appropriately     ROS: No fever or chills. Social: No family at bedside. Physical Exam:    VITALS:  BP 99/66   Pulse 81   Temp 97.9 °F (36.6 °C) (Oral)   Resp 22   Ht 5' 4\" (1.626 m)   Wt 253 lb 1.4 oz (114.8 kg)   LMP 10/24/2012   SpO2 99%   BMI 43.44 kg/m²     General appearance: Seems comfortable, no acute distress. Eyes: Conjunctivae pink, reactive pupils. Neck: Trachea midline, thyroid normal.   Lungs:  Non labored breathing, CTA to anterior auscultation. Heart:  S1S2 normal, rub or gallop. ++ peripheral edema. Abdomen: Soft, non-tender, no organomegaly. Skin: No lesions or rashes, warm to touch.        DATA:    CBC:   Lab Results   Component Value Date    WBC 8.9 07/12/2021    RBC 3.50 07/12/2021    HGB 9.5 07/12/2021    HCT 28.9 07/12/2021    MCV 84.8 07/12/2021    MCH 28.2 07/12/2021    MCHC 33.2 07/12/2021    RDW 16.7 07/12/2021     07/12/2021    MPV 8.5 07/12/2021     BMP:    Lab Results   Component Value Date     07/12/2021    K 3.4 07/12/2021    CL 89 07/12/2021    CO2 37 07/12/2021    BUN 79 07/12/2021    LABALBU 3.7 07/06/2021    CREATININE 1.9 07/12/2021    CALCIUM 9.2 07/12/2021    GFRAA 33 07/12/2021    LABGLOM 27 07/12/2021    GLUCOSE 134 07/12/2021              Assessment/     Chronic Kidney Disease:  Stage IV  - No proteinuria.  Multiple episodes of SHAUNNA  - Highly variable depending on cardiac and volume status but overall worsening      Chronic Hypotension:  Impacts pressure natriuresis      Hyponatremia:  Hypervolemic / Due to CHF              On tolvaptan 3 days a week     Systolic Heart Failure:  EF = 25%              Worsening edema and says she had gained 30 lbs but on room air     Plan/   cont Torsemide 100 mg daily     Metolazone 5 mg three times per week     PRN potassium replacement- extra po kcl 40 meq on 7/12   Monitor serial labs

## 2021-07-13 NOTE — PROGRESS NOTES
Hospitalist Progress Note      PCP: Kirsten Olson PA-C    Date of Admission: 7/2/2021      Brief HPI    60 y.o. female with multiple medical problems including but not limited to chronic combined systolic and diastolic heart failure EF 25%, DM type II, CAD status post CABG, COPD presented to emergency room with generalized weakness. .. The patient had just been discharged from [facility], admitted from 6/21/7/21 with acute decompensated combined systolic and diastolic heart failure complicated by hyponatremia, SHAUNNA, physical deconditioning. . Managed with Lasix drip. .. SNF was recommended at discharge but she declined. .. When she went home, she realized she was too weak to ambulate and called EMS that brought her to [facility]. Zacarias Solorzano On presentation, vital signs are stable. [place]are was clear. .  The patient is now agreeable to going to a skilled nursing facility for rehab    Subjective:   Mentation improved significantly today. Used BiPap last night. Up in chair and tolerating lunch but gets full quickly. Notes some pain in her left leg due to edema.      Medications:  Reviewed    Infusion Medications    dextrose      sodium chloride       Scheduled Medications    potassium chloride  40 mEq Oral BID    spironolactone  25 mg Oral Daily    potassium chloride  40 mEq Oral Once    torsemide  100 mg Oral Daily    metoprolol tartrate  25 mg Oral BID    miconazole   Topical BID    metOLazone  5 mg Oral Q MWF    apixaban  5 mg Oral BID    atorvastatin  80 mg Oral Nightly    benztropine  1 mg Oral Nightly    budesonide-formoterol  2 puff Inhalation BID    busPIRone  30 mg Oral BID    clopidogrel  75 mg Oral Daily    [Held by provider] gabapentin  300 mg Oral TID    lamoTRIgine  200 mg Oral BID    magnesium oxide  400 mg Oral Daily    pantoprazole  40 mg Oral BID    QUEtiapine  25 mg Oral Nightly    tiotropium  2 puff Inhalation Daily    insulin glargine  0.15 Units/kg Subcutaneous Nightly    insulin lispro  0.05 Units/kg Subcutaneous TID WC    insulin lispro  0-6 Units Subcutaneous TID WC    insulin lispro  0-3 Units Subcutaneous Nightly     PRN Meds: magnesium sulfate, potassium chloride, povidone-iodine, glucose, dextrose, glucagon (rDNA), dextrose, sodium chloride flush, sodium chloride, ondansetron **OR** ondansetron, acetaminophen **OR** acetaminophen      Intake/Output Summary (Last 24 hours) at 7/13/2021 1526  Last data filed at 7/13/2021 1352  Gross per 24 hour   Intake 840 ml   Output 3175 ml   Net -2335 ml       Exam:    BP 89/61   Pulse 81   Temp 97.5 °F (36.4 °C) (Axillary)   Resp 18   Ht 5' 4\" (1.626 m)   Wt 251 lb 5.2 oz (114 kg)   LMP 10/24/2012   SpO2 99%   BMI 43.14 kg/m²     General appearance: NAD. Sitting in chair and conversant. HEENT: Pupils equal, round, and reactive to light. Conjunctivae/corneas clear. Neck: Supple. Respiratory:  Crackles BL. Cardiovascular: Regular rate and rhythm with normal S1/S2 without murmurs, rubs or gallops. Abdomen: Soft, non-tender, non-distended with normal bowel sounds. Musculoskeletal: No clubbing, cyanosis. Generalized weakness. Pitting edema BL LE. Skin: warm and dry. Neurologic:  No focal deficits. Psychiatric:  Answers simple questions. Labs:   Recent Labs     07/12/21 0448 07/12/21  0628 07/13/21  0556   WBC 8.9  --  7.1   HGB 9.9* 9.5* 8.9*   HCT 29.7* 28.9* 27.0*     --  200     Recent Labs     07/11/21  2045 07/12/21  0448 07/13/21  0556   * 138 133*   K 3.2* 3.4* 2.8*   CL 86* 89* 85*   CO2 37* 37* 33*   BUN 80* 79* 80*   CREATININE 2.2* 1.9* 2.1*   CALCIUM 9.5 9.2 9.2     No results for input(s): AST, ALT, BILIDIR, BILITOT, ALKPHOS in the last 72 hours. No results for input(s): INR in the last 72 hours. Recent Labs     07/12/21  0448   TROPONINI 0.04*       Assessment/Plan:    1. Overall physical deconditioning/generalized weakness - due to CHF--- needs SNF for rehab.      2.  Acute on Chronic combined systolic and diastolic heart failure - EF 25% on ECHO 4/25. Nephrology assisting with fluid status. Continues torsemide 100mg daily and metolazone 5mg MWF. Cardiology consulted as in cardiomems program. Added spironolactone.     3. CKD stage 4 - multiple episodes of SHAUNNA. Overall worsening in status. 4. Coronary artery disease status post CABG-stable--continue Plavix, statin, beta-blocker.     5. Hyponatremia -hypervolemic due to CHF. Now resolved.      6. Hypokalemia - due to diuretics. Monitoring and replacing as needed. 7. Acute respiratory failure - hypoxic - secondary to volume overload from CHF, obesity and CLINT. Needs better compliance with CPAP/BiPap. Continue diuresis. 8. Obstructive sleep apnea - not compliant with CPAP. IPAP 16 and EPAP 6.    9. DM type II, insulin independent - uncontrolled. HbA1c 11.9. Will need insulin at DC for better control. Lantus and SSI.     10. NVGW-npbrje-lfldrqur inhaled steroids bronchodilators.     11. Morbid Obesity (Body mass index is 43.14 kg/m². ) - Complicating assessment and treatment. Placing patient at risk for multiple co-morbidities as well as early death and contributing to the patient's presentation. Counseled on weight loss. 12. Chronic venous insufficiency - with stasis dermatitis venous ulcers - continue diuretics and Ace wraps.     13. Peripheral vascular disease - status post prior angioplasty of multiple vessels: right renal artery, right SFA. Status post right femoropopliteal bypass. Continue ASA, statin. Resume plavix. 14. Paroxysmal atrial flutter, fibrillation - currently in sinus rhythm and rate controlled. Continue BB. Resume eliquis. Tele. 15. Acute urinary retention 7/3/21- improved with Lees placement. Can DC at SNF and do voiding trial once more active. 16. Anxiety - mood stable. Held some sedating medications due to lethargy.       17. Left knee pain - Xray showed mild patellofemoral compartment osteoarthritis with small joint effusion. Improved. 18. Encephalopathy - unsure of etiology. Possibly from polypharmacy from sedating medications that are prescribed. Holding sedating medications. Untreated CLINT and increased CO2 may be contributing. ABG in am reviewed. Improved after better compliance with BiPAP. DVT Prophylaxis: eliquis  Diet: ADULT DIET; Regular; 4 carb choices (60 gm/meal); 1000 ml  Code Status: Full Code    Disposition-- Hope to DC 1-2 days. Will need BiPap at SNF. Pt with multiple co-morbidities. High risk for readmission.       Yadiel Wood MD

## 2021-07-13 NOTE — PROGRESS NOTES
07/13/21 0244   NIV Type   NIV Started/Stopped On   Equipment Type v60   Mode Bilevel   Mask Type Full face mask   Mask Size Medium   Settings/Measurements   IPAP 16 cmH20   CPAP/EPAP 6 cmH2O   Rate Ordered 16   Resp 19   FiO2  24 %   Vt Exhaled 812 mL   Mask Leak (lpm) 9 lpm   Comfort Level Good   Using Accessory Muscles No   Patient Observation   Observations mepilex on nose   Alarm Settings   Alarms On Y   Press Low Alarm 6 cmH2O   High Pressure Alarm 30 cmH2O   Resp Rate Low Alarm 6   High Respiratory Rate 40 br/min

## 2021-07-13 NOTE — PROGRESS NOTES
07/13/21 0804   Oxygen Therapy/Pulse Ox   O2 Therapy Room air   O2 Device None (Room air)   Resp 22   SpO2 94 %   $Pulse Oximeter $Spot check (multiple/continuous)   Blood Gas  Performed? Yes   $ABG $ABG   Keenan's Test #1 Pos   Site #1 Left Radial   Site Prepped #1 Yes   Number of Attempts #1 2   Pressure Held #1 Yes   Complications #1 None   Post-procedure #1 Standard   Specimen Status #1 To lab   How Tolerated?  Tolerated well

## 2021-07-13 NOTE — PROGRESS NOTES
Department of Internal Medicine  Nephrology Progress Note        SUBJECTIVE:    We are following this patient for CKD management. Feels better than yesterday   cr up to 2.1 k 2.8    ROS: No fever or chills. Social:  family at bedside. Scheduled Meds:   potassium chloride  40 mEq Oral BID    spironolactone  25 mg Oral Daily    potassium chloride  40 mEq Oral Once    torsemide  100 mg Oral Daily    metoprolol tartrate  25 mg Oral BID    miconazole   Topical BID    metOLazone  5 mg Oral Q MWF    apixaban  5 mg Oral BID    atorvastatin  80 mg Oral Nightly    benztropine  1 mg Oral Nightly    budesonide-formoterol  2 puff Inhalation BID    busPIRone  30 mg Oral BID    clopidogrel  75 mg Oral Daily    [Held by provider] gabapentin  300 mg Oral TID    lamoTRIgine  200 mg Oral BID    magnesium oxide  400 mg Oral Daily    pantoprazole  40 mg Oral BID    QUEtiapine  25 mg Oral Nightly    tiotropium  2 puff Inhalation Daily    insulin glargine  0.15 Units/kg Subcutaneous Nightly    insulin lispro  0.05 Units/kg Subcutaneous TID WC    insulin lispro  0-6 Units Subcutaneous TID WC    insulin lispro  0-3 Units Subcutaneous Nightly     Continuous Infusions:   dextrose      sodium chloride       PRN Meds:.magnesium sulfate, potassium chloride, povidone-iodine, glucose, dextrose, glucagon (rDNA), dextrose, sodium chloride flush, sodium chloride, ondansetron **OR** ondansetron, acetaminophen **OR** acetaminophen      Physical Exam:    VITALS:  BP 89/61   Pulse 81   Temp 97.5 °F (36.4 °C) (Axillary)   Resp 18   Ht 5' 4\" (1.626 m)   Wt 251 lb 5.2 oz (114 kg)   LMP 10/24/2012   SpO2 99%   BMI 43.14 kg/m²     General appearance: Seems comfortable, no acute distress. Eyes: Conjunctivae pink, reactive pupils. Neck: Trachea midline, thyroid normal.   Lungs:  Non labored breathing, CTA to anterior auscultation.   Heart:  S1S2 normal, rub or gallop. trace peripheral edema w ace wrap on  Abdomen: Soft, non-tender, no organomegaly. Skin: No lesions or rashes, warm to touch.        DATA:    CBC:   Lab Results   Component Value Date    WBC 7.1 07/13/2021    RBC 3.18 07/13/2021    HGB 8.9 07/13/2021    HCT 27.0 07/13/2021    MCV 84.8 07/13/2021    MCH 27.9 07/13/2021    MCHC 32.9 07/13/2021    RDW 16.7 07/13/2021     07/13/2021    MPV 8.9 07/13/2021     BMP:    Lab Results   Component Value Date     07/13/2021    K 2.8 07/13/2021    CL 85 07/13/2021    CO2 33 07/13/2021    BUN 80 07/13/2021    LABALBU 3.7 07/06/2021    CREATININE 2.1 07/13/2021    CALCIUM 9.2 07/13/2021    GFRAA 29 07/13/2021    LABGLOM 24 07/13/2021    GLUCOSE 192 07/13/2021              Assessment/     Chronic Kidney Disease:  Stage IV  - No proteinuria.  Multiple episodes of SHAUNNA  - Highly variable depending on cardiac and volume status but overall worsening      Chronic Hypotension:  Impacts pressure natriuresis      Hyponatremia:  Hypervolemic / Due to CHF              On tolvaptan 3 days a week     Systolic Heart Failure:  EF = 25%              Worsening edema and says she had gained 30 lbs but on room air     Plan/   cont Torsemide 100 mg daily--> change to 50 mg daily      Metolazone 5 mg three times per week--> hold     Hold aldactone with plans to resume it once k, cr stabilizes     PRN potassium replacement- po kcl 40 meq times 2 and 20 meq iv times one on 7/13   Monitor serial labs

## 2021-07-13 NOTE — PROGRESS NOTES
Hospitalist Progress Note      PCP: Kevin Frankel PA-C    Date of Admission: 7/2/2021      Brief HPI    60 y.o. female with multiple medical problems including but not limited to chronic combined systolic and diastolic heart failure EF 25%, DM type II, CAD status post CABG, COPD presented to emergency room with generalized weakness. .. The patient had just been discharged from [facility], admitted from 6/21/7/21 with acute decompensated combined systolic and diastolic heart failure complicated by hyponatremia, SHAUNNA, physical deconditioning. . Managed with Lasix drip. .. SNF was recommended at discharge but she declined. .. When she went home, she realized she was too weak to ambulate and called EMS that brought her to [facility]. Jonathan Aviles On presentation, vital signs are stable. Leroy Dunham was clear. .  The patient is now agreeable to going to a skilled nursing facility for rehab    Subjective:   Pt sleeping a lot today. Worked with therapy but continues to require 2 person assistance.       Medications:  Reviewed    Infusion Medications    dextrose      sodium chloride       Scheduled Medications    torsemide  100 mg Oral Daily    metoprolol tartrate  25 mg Oral BID    miconazole   Topical BID    metOLazone  5 mg Oral Q MWF    [Held by provider] apixaban  5 mg Oral BID    aspirin EC  81 mg Oral Daily    atorvastatin  80 mg Oral Nightly    benztropine  1 mg Oral Nightly    budesonide-formoterol  2 puff Inhalation BID    busPIRone  30 mg Oral BID    [Held by provider] clopidogrel  75 mg Oral Daily    [Held by provider] gabapentin  300 mg Oral TID    lamoTRIgine  200 mg Oral BID    magnesium oxide  400 mg Oral Daily    pantoprazole  40 mg Oral BID    QUEtiapine  25 mg Oral Nightly    tiotropium  2 puff Inhalation Daily    insulin glargine  0.15 Units/kg Subcutaneous Nightly    insulin lispro  0.05 Units/kg Subcutaneous TID WC    insulin lispro  0-6 Units Subcutaneous TID WC    insulin lispro 0-3 Units Subcutaneous Nightly     PRN Meds: magnesium sulfate, potassium chloride, povidone-iodine, glucose, dextrose, glucagon (rDNA), dextrose, sodium chloride flush, sodium chloride, ondansetron **OR** ondansetron, acetaminophen **OR** acetaminophen      Intake/Output Summary (Last 24 hours) at 7/12/2021 2123  Last data filed at 7/12/2021 1901  Gross per 24 hour   Intake 1176 ml   Output 4700 ml   Net -3524 ml       Exam:    /67   Pulse 88   Temp 97.6 °F (36.4 °C) (Oral)   Resp 16   Ht 5' 4\" (1.626 m)   Wt 253 lb 1.4 oz (114.8 kg)   LMP 10/24/2012   SpO2 95%   BMI 43.44 kg/m²     General appearance: NAD. Lying in bed. Wakes up with light touch or verbal stimuli but then returns back to sleep after a short time. HEENT: Pupils equal, round, and reactive to light. Conjunctivae/corneas clear. Neck: Supple. Respiratory:  Normal respiratory effort. Clear to auscultation, bilaterally without Rales/Wheezes/Rhonchi. Cardiovascular: Regular rate and rhythm with normal S1/S2 without murmurs, rubs or gallops. Abdomen: Soft, non-tender, non-distended with normal bowel sounds. Musculoskeletal: No clubbing, cyanosis. Generalized weakness. Skin: warm and dry. Neurologic:  No focal deficits. Psychiatric:  Answers simple questions. Labs:   Recent Labs     07/12/21  0448 07/12/21  0628   WBC 8.9  --    HGB 9.9* 9.5*   HCT 29.7* 28.9*     --      Recent Labs     07/11/21  0805 07/11/21  2045 07/12/21  0448    135* 138   K 3.0* 3.2* 3.4*   CL 87* 86* 89*   CO2 40* 37* 37*   BUN 84* 80* 79*   CREATININE 2.0* 2.2* 1.9*   CALCIUM 9.7 9.5 9.2     No results for input(s): AST, ALT, BILIDIR, BILITOT, ALKPHOS in the last 72 hours. No results for input(s): INR in the last 72 hours. Recent Labs     07/12/21  0448   TROPONINI 0.04*       Assessment/Plan:    1. Overall physical deconditioning/generalized weakness - due to CHF--- needs SNF for rehab.      2.  Acute on Chronic combined systolic and diastolic heart failure - EF 25% on ECHO 4/25. Nephrology assisting with fluid status. Continues torsemide 100mg daily and metolazone 5mg MWF.      3. CKD stage 4 - multiple episodes of SHAUNNA. Overall worsening in status. 4. Coronary artery disease status post CABG-stable--continue aspirin, Plavix, statin, beta-blocker.     5. Hyponatremia -hypervolemic due to CHF. Now resolved.      6. Hypokalemia - due to diuretics. Monitoring and replacing as needed. 7. Acute respiratory failure - hypoxic - secondary to volume overload from CHF, obesity and CLINT. Needs better compliance with CPAP/BiPap. Continue diuresis. 8. Obstructive sleep apnea - not compliant with CPAP. 9. DM type II, insulin independent - uncontrolled. HbA1c 11.9. Will need insulin at DC for better control. Lantus and SSI.     10. [id number] inhaled steroids bronchodilators.     11. Morbid Obesity (Body mass index is 43.44 kg/m². ) - Complicating assessment and treatment. Placing patient at risk for multiple co-morbidities as well as early death and contributing to the patient's presentation. Counseled on weight loss. 12. Chronic venous insufficiency - with stasis dermatitis venous ulcers - continue diuretics and Ace wraps.     13. Peripheral vascular disease - status post prior angioplasty of multiple vessels: right renal artery, right SFA. Status post right femoropopliteal bypass. Continue ASA, statin. Resume plavix. 14. Paroxysmal atrial flutter, fibrillation - currently in sinus rhythm and rate controlled. Continue BB. Resume eliquis. Tele. 15. Acute urinary retention 7/3/21- improved with Lees placement. Can DC at SNF and do voiding trial once more active. 16. Anxiety - mood stable. Held some sedating medications due to lethargy. 17. Left knee pain - Xray showed mild patellofemoral compartment osteoarthritis with small joint effusion. Improved. 18. Encephalopathy - unsure of etiology.  Possibly from polypharmacy from sedating medications that are prescribed. Currently some of those are held. Untreated CLINT and increased CO2 may be contributing. Will check ABG in am. May need head CT or further work-up if no improvement. Could consider Neuro consult. DVT Prophylaxis: eliquis  Diet: ADULT DIET; Regular; 4 carb choices (60 gm/meal); 1000 ml  Code Status: Full Code    Disposition-- 1-2 days. Will need improved mentation and to complete diuresis. Pt with multiple co-morbidities. High risk for readmission.       Yadiel Wood MD

## 2021-07-13 NOTE — PROGRESS NOTES
Physical Therapy  Facility/Department: [facility] B[facility] - MED SURG  Daily Treatment Note  NAME: Terrell Moreland  : 1965  MRN: [id number]    Date of Service: 2021    Discharge Recommendations:  Subacute/Skilled Nursing Facility   PT Equipment Recommendations  Equipment Needed: No  Other: Defer to next level of care    Assessment   Body structures, Functions, Activity limitations: Decreased functional mobility ; Decreased sensation; Increased pain;Decreased balance;Decreased ROM; Decreased strength;Decreased safe awareness;Decreased endurance  Assessment: Pt tolerated therapy well this date. Seen as co-treat with OT to safely progress functional mobility and maximize outcomes. Pt still unable to stand to SW this date despite multiple attempts, 2 person assist and utilizing rocking method. pt able to get to stand via johny stedy. Assite dpt on and off commode and into recliner. pt tearful this session regarding current mobility status however in better spirits towards end of session. Will continue to progress towards goals  Treatment Diagnosis: Impaired functional mobility and balance  Specific instructions for Next Treatment: Progress mobility as tolerated  Prognosis: Good  Decision Making: Medium Complexity  PT Education: Goals; General Safety;PT Role;Disease Specific Education;Plan of Care; Functional Mobility Training;Pressure Relief;Precautions; Injury Prevention;Transfer Training;Energy Conservation;Equipment  Patient Education: Pt educated on importance of OOB exercise and participation in PT. Pt demonstrated understanding  Barriers to Learning: none  REQUIRES PT FOLLOW UP: Yes  Activity Tolerance  Activity Tolerance: Patient Tolerated treatment well;Patient limited by fatigue;Patient limited by endurance  Activity Tolerance: o2 95% on 2 L's HR 91 and /71     Patient Diagnosis(es): The encounter diagnosis was Generalized weakness.      has a past medical history of Acute blood loss anemia, Acute kidney injury ([address] Acute kidney injury superimposed on CKD ([address] Acute on chronic combined systolic and diastolic congestive heart failure ([address] Acute on chronic right-sided heart failure ([address] Alcohol dependence ([address] Arthritis, Asthma, CAD (coronary artery disease), Cellulitis, COPD (chronic obstructive pulmonary disease) ([address] Diabetic ulcer of left foot associated with type 2 diabetes mellitus, limited to breakdown of skin ([address] Diabetic ulcer of toe of right foot associated with type 2 diabetes mellitus ([address] Left renal artery stenosis ([address] MI (myocardial infarction) ([address] Positive FIT (fecal immunochemical test), 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 has a past surgical history that includes Tonsillectomy; Cholecystectomy; tumor excision; Upper gastrointestinal endoscopy (4/25/2013); Upper gastrointestinal endoscopy (12/10/2015); Upper gastrointestinal endoscopy (01/06/2017); Arterial bypass surgry (Left, 01/06/2016); Cardiac catheterization (03/27/2018); Coronary angioplasty with stent (03/16/2018); Rotator cuff repair (Left, 04/22/2016); Coronary angioplasty with stent (01/03/2018); Insertable Cardiac Monitor (02/14/2019); femoral bypass (Right, 5/16/2019); transluminal angioplasty (Left, 10/08/2019); Cardiac catheterization (01/20/2020); Coronary artery bypass graft (N/A, 1/27/2020); vascular surgery (Left, 12/08/2015); transluminal angioplasty (Left, 04/29/2020); vascular surgery (Bilateral, 07/07/2020); Coronary angioplasty with stent (10/07/2019); transesophageal echocardiogram (01/26/2020); vascular surgery (Left, 08/04/2020); vascular surgery (Left, 08/05/2020); vascular surgery (Left, 09/01/2020); and IR NONTUNNELED VASCULAR CATHETER > 5 YEARS (7/9/2021).     Restrictions  Restrictions/Precautions  Restrictions/Precautions: General Precautions, Fall Risk, Up as Tolerated  Required Braces or Orthoses?: No  Position Activity Restriction  Other position/activity restrictions: rhonda roche  Subjective   General  Chart Reviewed: Yes  Additional Pertinent Hx: Per Dr. Eddie Arias H&P \"62 y.o. female with multiple medical problems including but not limited to chronic combined systolic and diastolic heart failure EF 25%, DM type II, CAD status post CABG, COPD presented to emergency room with generalized weakness\"  Response To Previous Treatment: Patient with no complaints from previous session. Family / Caregiver Present: No  Referring Practitioner: David Saeed MD  Subjective  Subjective: Pt supine in bed on approach, agreeable to PT tx  General Comment  Comments: RN cleared pt for session  Pain Screening  Patient Currently in Pain: Yes  Vital Signs  Patient Currently in Pain: Yes       Orientation  Orientation  Overall Orientation Status: Impaired  Orientation Level: Oriented to person;Disoriented to place; Disoriented to time;Disoriented to situation  Cognition      Objective   Bed mobility  Supine to Sit: Minimal assistance (HOB elevated)  Sit to Supine:  (pt left up in chair with needs in reach)  Transfers  Sit to Stand: Maximum Assistance;2 Person Assistance (attempted stand X 3 to SW this date from bed, unable to achieve stand, johny serra utilized from bed with max A X 2 to acheive stand. Assited pt to [facility] and then to recliner chair. Max AX for all stand to johny serra, CGA from [facility] of johny serra.)  Stand to sit: 2 Person Assistance;Maximum Assistance  Bed to Chair: Dependent/Total (via johny stedy)  Ambulation  Ambulation?: No  Stairs/Curb  Stairs?: No     Balance  Standing - Static: Poor;+  Standing - Dynamic: +;Poor  Comments: static  johny steady X 2 min first stand, 4 min 2nd stand, X 60 seconds third stand for aaron care.  pt unable to do any pericare this date    AM-PAC Score  AM-PAC Inpatient Mobility Raw Score : 9 (07/13/21 1119)  AM-PAC Inpatient T-Scale Score : 30.55 (07/13/21 1119)  Mobility Inpatient CMS 0-100%

## 2021-07-13 NOTE — PROGRESS NOTES
07/13/21 0000   NIV Type   $NIV $Daily Charge   NIV Started/Stopped On   Equipment Type v60   Mode Bilevel   Mask Type Full face mask   Mask Size Medium   Settings/Measurements   IPAP 16 cmH20   CPAP/EPAP 6 cmH2O   Rate Ordered 16   Resp 19   FiO2  24 %   Vt Exhaled 706 mL   Mask Leak (lpm) 45 lpm   Comfort Level Good   Using Accessory Muscles No   SpO2 96   Patient Observation   Observations mepilex on nose   Alarm Settings   Alarms On Y   Press Low Alarm 6 cmH2O   High Pressure Alarm 30 cmH2O   Resp Rate Low Alarm 6   High Respiratory Rate 40 br/min

## 2021-07-13 NOTE — PROGRESS NOTES
07/12/21 2232   NIV Type   NIV Started/Stopped On   Equipment Type v60   Mode Bilevel   Mask Type Full face mask   Mask Size Medium   Settings/Measurements   IPAP 16 cmH20   CPAP/EPAP 6 cmH2O   Rate Ordered 16   Resp 20   FiO2  24 %   Vt Exhaled 608 mL   Mask Leak (lpm) 0 lpm   Comfort Level Good   Using Accessory Muscles No   SpO2 97   Patient Observation   Observations mepilex on nose   Alarm Settings   Alarms On Y   Press Low Alarm 6 cmH2O   High Pressure Alarm 30 cmH2O   Resp Rate Low Alarm 6   High Respiratory Rate 40 br/min

## 2021-07-13 NOTE — CONSULTS
REASON FOR CONSULTATION/CC: CHF, cardiomems    CONSULTING PHYSICIAN: Dr. Azael Wilkerson:  Clint Abraham is a 60y.o. year old female with past medical history of ischemic cardiomyopathy, CHF, s/p CardioMEMS implant in 2019, CAD s/p PCI to LAD and LCx 2018, CABG X1 (LIMA to LAD,Jan 2020 for restenosis of LAD and LCx and significant dz in RCA), CKD/cardiorenal syndrome,  DM, COPD, PAD, chronic leg wounds, chronic pain presented to [facility] on 7/2/21 with complaints of weakness and difficulty to ambulate. Recently admitted to [facility]. for CHF, treated with lasix infusion, discharge to home despite recommendations she needed to go to SNF. She was discharged on bumex, aldactone. Presented to [facility] for further evaluation of weakness. Since admission, she was treated with lasix infusion. Admission weight up to 267lbs. Cardiology was consulted for further evaluation of cardioMems pressures, known to our service. Since admission she is net negative 34L. Currently on torsemide 100mg daily and metolazone three times a week. Her PAD (PA diastolic, which is a surrogate for wedge pressures) today was 27. She feels more awake today, remains weak. Off of bipap, breathing is stable. No chest pain.  Edema very slow to improve.     07/13/21 0628  251 lb 5.2 oz (114 kg)  Bed scale      07/12/21 0500  253 lb 1.4 oz (114.8 kg)  --     07/11/21 0416  256 lb 2.8 oz (116.2 kg)  Bed scale     07/10/21 0656  255 lb 11.7 oz (116 kg)  Bed scale     07/08/21 0609  273 lb 5.9 oz (124 kg)  Bed scale     07/07/21 0326  271 lb 9.7 oz (123.2 kg)  Bed scale     07/06/21 0458  275 lb 2.2 oz (124.8 kg)  Bed scale     07/05/21 0300  279 lb 1.6 oz (126.6 kg)  Bed scale     07/04/21 0530  275 lb 12.7 oz (125.1 kg)  Bed scale     07/02/21 2230  267 lb 6.7 oz (121.3 kg)  --     07/02/21 1746  266 lb (120.7 kg)  Stated             PAST MEDICAL HISTORY:  Past Medical History:   Diagnosis Date    Acute blood loss anemia 8/5/2020    Acute kidney injury ([address] 12/25/2019    Acute kidney injury superimposed on CKD ([address] 8/5/2020    Acute on chronic combined systolic and diastolic congestive heart failure ([address] 1/10/2020    Acute on chronic right-sided heart failure ([address] 08/2020    Alcohol dependence ([address] 3/10/2010    Arthritis     Asthma     CAD (coronary artery disease)     Cellulitis 6/3/2020    COPD (chronic obstructive pulmonary disease) (HCC)     Diabetic ulcer of left foot associated with type 2 diabetes mellitus, limited to breakdown of skin ([address] 1/18/2020    Diabetic ulcer of toe of right foot associated with type 2 diabetes mellitus ([address] 1/18/2020    Left renal artery stenosis ([address] 08/2020    MI (myocardial infarction) ([address] 10/07/2019    NSTEMI    Positive FIT (fecal immunochemical test) 2/28/2020    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 steal syndrome 09/01/2020    Traumatic perinephric hematoma, left, initial encounter 8/4/2020     PAST SURGICAL HISTORY:  Past Surgical History:   Procedure Laterality Date    ARTERIAL BYPASS SURGRY Left 01/06/2016    Axillary/Subclavian to Brachial Bypass w/reversed SVG    CARDIAC CATHETERIZATION  03/27/2018    Dr. Colon Guardian - at [facility]  01/20/2020    Dr. Kylie Moses      pancreatitis post surgery    CORONARY ANGIOPLASTY WITH STENT PLACEMENT  03/16/2018    MELODY- 3.5 x 38 and 3.5 x 20 to Cx    CORONARY ANGIOPLASTY WITH STENT PLACEMENT  01/03/2018    MELODY- 3.0 x 38 and 2.75 x 26 and 2.75 x 8 and 2.75 x 22 to the LAD    CORONARY ANGIOPLASTY WITH STENT PLACEMENT  10/07/2019    MELODY- 2.5 x 8 to mLAD    CORONARY ARTERY BYPASS GRAFT N/A 1/27/2020    CORONARY ARTERY BYPASS GRAFTING X 1, ON PUMP BEATING HEART, INTERNAL MAMMARY ARTERY TO LEFT ANTERIOR DESCENDING ARTERY, VAS CATH INSERTION, URI, PLATELET GEL APPLICATION, 5 LEVEL BILATERAL INTERCOSTAL NERVE BLOCK, STERNAL PLATING performed by Kristina Gonzalez MD at [address] Right 5/16/2019    fem-pop, performed by Awilda Mcgee MD at [address]  02/14/2019    CardioMEMs insertion for CHF    IR NONTUNNELED VASCULAR CATHETER  7/9/2021    IR NONTUNNELED VASCULAR CATHETER 7/9/2021 [facility]ED[facility]    ROTATOR CUFF REPAIR Left 04/22/2016    TONSILLECTOMY      TRANSESOPHAGEAL ECHOCARDIOGRAM  01/26/2020    TRANSLUMINAL ANGIOPLASTY Left 10/08/2019    with stenting, at [facility]    [address] Left 04/29/2020    of the [facility] re-occlusion    TUMOR EXCISION      benign tumors X 2 chest & axilla    UPPER GASTROINTESTINAL ENDOSCOPY  4/25/2013    BX barretts, HH, gastritis    UPPER GASTROINTESTINAL ENDOSCOPY  12/10/2015    UPPER GASTROINTESTINAL ENDOSCOPY  01/06/2017    Gastritis    VASCULAR SURGERY Left 12/08/2015    upper extremity and mesenteric angiogram (diagnostic only)    VASCULAR SURGERY Bilateral 07/07/2020    diagnostic lower extremity angiogram only    VASCULAR SURGERY Left 08/04/2020    angioplasty and stent of renal artery    VASCULAR SURGERY Left 08/05/2020    coil embolization of branch renal artery vessel for bleeding    VASCULAR SURGERY Left 09/01/2020    angioplasty and stenting of subclavian artery       FAMILY HISTORY:  family history includes Cancer in her maternal aunt and mother; Heart Disease in her maternal grandmother. SOCIAL HISTORY:   reports that she has been smoking cigarettes. She has a 30.00 pack-year smoking history.  She has never used smokeless tobacco.    Scheduled Meds:   potassium chloride  40 mEq Oral BID    torsemide  100 mg Oral Daily    metoprolol tartrate  25 mg Oral BID    miconazole   Topical BID    metOLazone  5 mg Oral Q MWF    apixaban  5 mg Oral BID    aspirin EC  81 mg Oral Daily    atorvastatin  80 mg Oral Nightly    benztropine  1 mg Oral Nightly    budesonide-formoterol  2 puff Inhalation BID    busPIRone  30 mg Oral BID    clopidogrel  75 mg Oral Daily    [Held by provider] gabapentin  300 mg Oral TID    lamoTRIgine  200 mg Oral BID    magnesium oxide  400 mg Oral Daily    pantoprazole  40 mg Oral BID    QUEtiapine  25 mg Oral Nightly    tiotropium  2 puff Inhalation Daily    insulin glargine  0.15 Units/kg Subcutaneous Nightly    insulin lispro  0.05 Units/kg Subcutaneous TID WC    insulin lispro  0-6 Units Subcutaneous TID WC    insulin lispro  0-3 Units Subcutaneous Nightly     Continuous Infusions:   dextrose      sodium chloride       PRN Meds:  magnesium sulfate, potassium chloride, povidone-iodine, glucose, dextrose, glucagon (rDNA), dextrose, sodium chloride flush, sodium chloride, ondansetron **OR** ondansetron, acetaminophen **OR** acetaminophen    ALLERGIES:  Patient is allergic to lisinopril. REVIEW OF SYSTEMS:  Constitutional: Negative for fever  HENT: Negative for sore throat  Eyes: Negative for redness   Respiratory: Negative for dyspnea, cough  Cardiovascular: Negative for chest pain  Gastrointestinal: Negative for vomiting, diarrhea   Genitourinary: Negative for hematuria   Musculoskeletal: Negative for arthralgias   Skin: + for wounds   Neurological: Negative for syncope  Hematological: Negative for adenopathy  Psychiatric/Behavorial: Negative for anxiety, + confusion     PHYSICAL EXAM:  Blood pressure 89/61, pulse 81, temperature 97.5 °F (36.4 °C), temperature source Axillary, resp. rate 18, height 5' 4\" (1.626 m), weight 251 lb 5.2 oz (114 kg), last menstrual period 10/24/2012, SpO2 99 %, not currently breastfeeding.'  Temp  Av.5 °F (36.4 °C)  Min: 97.4 °F (36.3 °C)  Max: 97.6 °F (36.4 °C)  Gen: No distress. NAD, chronically ill appearing   Eyes:  No sclera icterus. No conjunctival injection. Neck: Trachea midline. No obvious mass. Prior dressing from central line intact   Resp: No accessory muscle use. Diminished throughout   CV: Regular rate. Regular rhythm. No murmur or rub. ++ BLE edema. GI: Non-tender. Non-distended. No hernia. + edema of pannus   Skin: Warm and dry. M/S: No cyanosis. No joint deformity. No clubbing. Neuro: Awake. Alert. Moves all four extremities. Psych: Oriented to self and place  No anxiety. LABS:  CBC:   Recent Labs     07/12/21  0448 07/12/21  0628 07/13/21  0556   WBC 8.9  --  7.1   HGB 9.9* 9.5* 8.9*   HCT 29.7* 28.9* 27.0*   MCV 84.8  --  84.8     --  200     BMP:   Recent Labs     07/11/21  2045 07/12/21  0448 07/13/21  0556   * 138 133*   K 3.2* 3.4* 2.8*   CL 86* 89* 85*   CO2 37* 37* 33*   BUN 80* 79* 80*   CREATININE 2.2* 1.9* 2.1*     LIVER PROFILE: No results for input(s): AST, ALT, LIPASE, BILIDIR, BILITOT, ALKPHOS in the last 72 hours. Invalid input(s): AMYLASE,  ALB  PT/INR: No results for input(s): PROTIME, INR in the last 72 hours. APTT: No results for input(s): APTT in the last 72 hours. UA:No results for input(s): NITRITE, COLORU, PHUR, LABCAST, WBCUA, RBCUA, MUCUS, TRICHOMONAS, YEAST, BACTERIA, CLARITYU, SPECGRAV, LEUKOCYTESUR, UROBILINOGEN, BILIRUBINUR, BLOODU, GLUCOSEU, AMORPHOUS in the last 72 hours.     Invalid input(s): Yordan Collins  Recent Labs     07/10/21  2334 07/13/21  0805   PHART 7.531* 7.523*   IUB3BIB 51.7* 43.8   PO2ART 78.0 67.2*       Films:    ECHO 4/26/2021   Summary   Definity contrast administered.   Left ventricular systolic function is reduced with ejection fraction   estimated at 25 %.   There is systolic and diastolic septal flattening consistent with right   ventricular pressure and volume overload.   There is akinesis of the entire apex.   There is hypokinesis of the anterior and anteroseptal wall.   Left ventricular size is mildly increased.   Normal left ventricular wall thickness.   Grade III diastolic dysfunction with elevated filing pressure.   Moderate mitral regurgitation.   No vegetation or masses are seen on the mitral valve.   Right ventricular systolic function is mildly to moderately reduced .   The right ventricle is mildly enlarged.   Moderate tricuspid regurgitation.   Systolic pulmonary artery pressure (SPAP) estimated at 52 mmHg (RA pressure 8 mmHg), consistent with moderate pulmonary hypertension.   No vegetation or masses are seen on the tricuspid valve.   The right atrium is mildly dilated.   If suspicion for endocarditis is high recommend URI. ASSESSMENT:  ·  acute on chronic combine systolic and diastolic heart failure. LVEF 25%  · S/p CardioMEMs implant 2019- unfortunately she is not compliant with taking her daily readings at home, therefore it is difficult to prevent a readmission due to CHF without her being compliant with obtaining this information. · Coronary artery disease status post CABG  · PAF  · Hyponatremia, hypervolemic  · CKD stage IV  · CLINT  · Diabetes-not controlled  · Obesity  · PAD  · Metabolic encephalopathy  · Chronic hypotension   · Hypokalemia  · Anemia    PLAN:  · Daily weights, strict I's and O's  Daily cardio mems readings  Continue aspirin statin, patient is also on plavix; recommend stopping aspirin to avoid triple therapy given underlying anemia. Continue Eliquis 5 mg p.o. twice daily  Add on iron studies  Potasium being replaced; add on low dose Spironolactone (aldactone) 25mg daily  Continue torsemide 100mg daily for now, as hypokalemia is complicating increasing this for now  Agree with metolazone 5mg Three times a week- historically hypokalemia tends to limit the use of this med in her.      High risk for readmission due to compliance     Luanne Parkinson CNP, 7/13/2021, 3:18 PM

## 2021-07-13 NOTE — PROGRESS NOTES
Occupational Therapy  Facility/Department: [facility] B3 - MED SURG  Daily Treatment Note  NAME: Gabi Gutierrez  : 1965  MRN: [id number]    Date of Service: 2021    Discharge Recommendations:  Subacute/Skilled Nursing Facility       Assessment   Performance deficits / Impairments: Decreased functional mobility ; Decreased ADL status; Decreased strength;Decreased safe awareness;Decreased cognition;Decreased balance;Decreased sensation;Decreased posture;Decreased endurance  Assessment: Pt seen for cotx with PT d/t assist level, limited activity tolerance and decreased cognition. Pt unable to stand with max A X2 to SW, unable to clear buttocks on multiple attempts. Pt able to  [facility] stedy with max A x2, tolerates standing up to 2 min on one attempt, as pt fatigues easily with min activity. Pt transferred to [facility] with A x 2 and johny stedy, total A for pericare after BM, pt reports \"My arms are too short, my friend Chen wipes me after I  go\". Cont to recommend SNF at d/c. Patient Education: disease specific: PLB, transfers, OOB activity  Barriers to Learning: cognition  REQUIRES OT FOLLOW UP: Yes  Activity Tolerance  Activity Tolerance: Patient Tolerated treatment well;Patient limited by pain; Patient limited by fatigue  Activity Tolerance: 108/71 HR 97 O2 95% on RA  Safety Devices  Safety Devices in place: Yes  Type of devices: Gait belt;Nurse notified;Call light within reach; Left in bed;Bed alarm in place         Patient Diagnosis(es): The encounter diagnosis was Generalized weakness.       has a past medical history of Acute blood loss anemia, Acute kidney injury ([address] Acute kidney injury superimposed on CKD ([address] Acute on chronic combined systolic and diastolic congestive heart failure ([address] Acute on chronic right-sided heart failure ([address] Alcohol dependence ([address] Arthritis, Asthma, CAD (coronary artery disease), Cellulitis, COPD (chronic obstructive pulmonary disease) ([address] Diabetic ulcer of left foot associated with type 2 diabetes mellitus, limited to breakdown of skin ([address] Diabetic ulcer of toe of right foot associated with type 2 diabetes mellitus ([address] Left renal artery stenosis ([address] MI (myocardial infarction) ([address] Positive FIT (fecal immunochemical test), 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 has a past surgical history that includes Tonsillectomy; Cholecystectomy; tumor excision; Upper gastrointestinal endoscopy (4/25/2013); Upper gastrointestinal endoscopy (12/10/2015); Upper gastrointestinal endoscopy (01/06/2017); Arterial bypass surgry (Left, 01/06/2016); Cardiac catheterization (03/27/2018); Coronary angioplasty with stent (03/16/2018); Rotator cuff repair (Left, 04/22/2016); Coronary angioplasty with stent (01/03/2018); Insertable Cardiac Monitor (02/14/2019); femoral bypass (Right, 5/16/2019); transluminal angioplasty (Left, 10/08/2019); Cardiac catheterization (01/20/2020); Coronary artery bypass graft (N/A, 1/27/2020); vascular surgery (Left, 12/08/2015); transluminal angioplasty (Left, 04/29/2020); vascular surgery (Bilateral, 07/07/2020); Coronary angioplasty with stent (10/07/2019); transesophageal echocardiogram (01/26/2020); vascular surgery (Left, 08/04/2020); vascular surgery (Left, 08/05/2020); vascular surgery (Left, 09/01/2020); and IR NONTUNNELED VASCULAR CATHETER > 5 YEARS (7/9/2021). Restrictions  Restrictions/Precautions  Restrictions/Precautions: General Precautions, Fall Risk, Up as Tolerated  Required Braces or Orthoses?: No  Position Activity Restriction  Other position/activity restrictions: rhonda roche avasys  Subjective   General  Chart Reviewed: Yes  Patient assessed for rehabilitation services?: Yes  Family / Caregiver Present: No  Diagnosis: generalized weakness  Subjective  Subjective: Pt supine in bed upon arrival and agreeable to session.   General Comment  Comments: RN clears for therapy  Pain Assessment  Pain Level: 7  Pain Type: Acute pain  Pain Location: Generalized  Vital Signs  Patient Currently in Pain: Yes   Orientation  Orientation  Overall Orientation Status: Within Functional Limits  Objective    ADL  Grooming: Moderate assistance  LE Dressing: Dependent/Total (socks)  Toileting: Dependent/Total (roche and after BM, pt reports \"friend\" she lives with cleans her after BM \"because my arms are too short\")        Balance  Sitting Balance: Stand by assistance  Standing Balance: Dependent/Total (x 2 in [facility])  Standing Balance  Time: 2-3 min x 2, 1-2 x multiple attempts  Activity: standing in [facility] ,on/off BSC  Comment: standing EOB; transfer in [facility] to chair/BSC  Functional Mobility  Functional - Mobility Device: Other  Assist Level: Dependent/Total  Functional Mobility Comments: [facility] bed>BSC>recliner     Transfers  Sit to stand: 2 Person assistance;Maximum assistance  Stand to sit: Maximum assistance;2 Person assistance  Transfer Comments: attempted to stand to SW, pt unable to clear buttocks with max A x 2, mod/max A x 2 to  [facility]                       Cognition  Overall Cognitive Status: Exceptions  Arousal/Alertness: Delayed responses to stimuli  Following Commands: Follows one step commands consistently; Follows one step commands with increased time  Attention Span: Attends with cues to redirect  Memory: Appears intact  Safety Judgement: Decreased awareness of need for assistance  Problem Solving: Assistance required to generate solutions  Insights: Decreased awareness of deficits  Initiation: Requires cues for some  Sequencing: Requires cues for some  Cognition Comment: Pt agitated and wanting to leave AMA, then labile and crying because she can't stand/walk       Plan   Plan  Times per week: 3-5x/wk  Current Treatment Recommendations: Strengthening, Patient/Caregiver Education & Training, Equipment Evaluation, Education, & procurement,

## 2021-07-14 NOTE — CARE COORDINATION
CASE MANAGEMENT DISCHARGE SUMMARY      Discharge to: [facility] to [id number] [facility] completed: yes  Hospital Exemption Notification (HENS) completed: yes    IMM given: (date) 07/14/21     New Durable Medical Equipment ordered/agency: n/a    Transportation:    Medical Transport explained to [facility]. Pt/family voice no agency preference. Agency used: Debar[facility] Finder up time: 1400   Ambulance form completed: Yes    Confirmed discharge plan with:     Patient: yes     Family:  Yes daughter, Glenroy Flood [phone]     Facility/Agency, name:  [facility] faxed to facility     Phone number for report to facility: [phone]     RN, name: Jordon Ho    Note: Discharging nurse to complete [facility], reconcile [facility], and place final copy with patient's discharge packet. RN to ensure that written prescriptions for Level II medications are sent with patient to the facility as per protocol.     Renard Campbell RN CM

## 2021-07-14 NOTE — PROGRESS NOTES
07/13/21 2157   NIV Type   Skin Assessment Clean, dry, & intact   Skin Protection for O2 Device Yes   Location Nose   NIV Started/Stopped On   Equipment Type v60   Mode Bilevel   Mask Type Full face mask   Mask Size Medium   Settings/Measurements   IPAP 16 cmH20   CPAP/EPAP 6 cmH2O   Rate Ordered 16   Resp 23   FiO2  24 %   Vt Exhaled 497 mL   Minute Volume 10.1 Liters   Mask Leak (lpm) 0 lpm   Comfort Level Good   Using Accessory Muscles No   SpO2 98   Alarm Settings   Alarms On Y   Press Low Alarm 6 cmH2O   High Pressure Alarm 30 cmH2O   Apnea (secs) 20 secs   Resp Rate Low Alarm 6   High Respiratory Rate 40 br/min

## 2021-07-14 NOTE — PROGRESS NOTES
Patient discharged to [facility]. Patient educated using teach back method. Report to nurse at [facility] [place]. Report to transport. Paperwork given to transport.

## 2021-07-14 NOTE — CARE COORDINATION
CM called [facility] ([phone]) to confirm receipt of precert packet faxed yesterday. Per Tracie Mckeon" at [facility] the case is approved    [facility] Ref # [id number]  ID # [id number]  Approved for 3 days. Start day 7/13  NRD 7/15  Fax clinicals to Robe Anthony [phone]    ALEX left for Nelson at [address] with updates as to above.

## 2021-07-14 NOTE — PROGRESS NOTES
07/14/21 0036   NIV Type   $NIV $Daily Charge   Skin Protection for O2 Device Yes   Location Nose   NIV Started/Stopped On   Equipment Type v60   Mode Bilevel   Mask Type Full face mask   Mask Size Medium   Bonnet size Medium   Settings/Measurements   IPAP 16 cmH20   CPAP/EPAP 6 cmH2O   Rate Ordered 16   Resp 20   FiO2  24 %   Vt Exhaled 472 mL   Minute Volume 11.2 Liters   Mask Leak (lpm) 2 lpm   Comfort Level Good   Using Accessory Muscles No   SpO2 98   Alarm Settings   Alarms On Y   Press Low Alarm 6 cmH2O   High Pressure Alarm 30 cmH2O   Apnea (secs) 20 secs   Resp Rate Low Alarm 6   High Respiratory Rate 40 br/min

## 2021-07-14 NOTE — DISCHARGE SUMMARY
Hospital Medicine Discharge Summary    Patient ID: Nikole Potts      Patient's PCP: Fausto Pina PA-C    Admit Date: 7/2/2021     Discharge Date: 7/14/2021      Admitting Physician: No admitting provider for patient encounter. Discharge Physician: Quang Reyes MD     Discharge Diagnoses: Active Hospital Problems    Diagnosis     Generalized weakness [R53.1]     Heart failure (HCC) [I50.9]     CKD (chronic kidney disease) stage 4, GFR 15-29 ml/min (HCC) [N18.4]     PAD (peripheral artery disease) (HCC) [I73.9]     COPD (chronic obstructive pulmonary disease) (HCC) [J44.9]     GERD (gastroesophageal reflux disease) [K21.9]     Tobacco abuse disorder [Z72.0]     CLINT (obstructive sleep apnea) [G47.33]     Type 2 diabetes mellitus with diabetic polyneuropathy, with long-term current use of insulin ([facility] [address] [E11.42, Z79.4]        The patient was seen and examined on day of discharge and this discharge summary is in conjunction with any daily progress note from day of discharge. Hospital Course:     60 y. o. female with multiple medical problems including but not limited to chronic combined systolic and diastolic heart failure EF 25%, DM type II, CAD status post CABG, COPD presented to emergency room with generalized weakness. ..    The patient had just been discharged from [address] [place] [address], admitted from 6/21/7/21 with acute decompensated combined systolic and diastolic heart failure complicated by hyponatremia, SHAUNNA, physical deconditioning. Dunia Leon with Lasix drip. .. SNF was recommended at discharge but she declined. .. When she went home, she realized she was too weak to ambulate and called EMS that brought her to [facility]. .    1. Overall physical deconditioning/generalized weakness - due to CHF--- needs SNF for rehab.      2. Acute on Chronic combined systolic and diastolic heart failure - EF 25% on ECHO 4/25. Nephrology assisting with fluid status.  Continues torsemide 50mg daily and metolazone 5mg MWF. Cardiology consulted as in cardiomems program. Added spironolactone then held at DC - can resume as outpt.     3. CKD stage 4 - multiple episodes of SHAUNNA. Overall worsening in status.      4. Coronary artery disease status post CABG-stable--continue Plavix, statin, beta-blocker.     5. Hyponatremia -hypervolemic due to CHF. Now resolved.       6. Hypokalemia - due to diuretics. Monitoring and replacing as needed.      7. Acute respiratory failure - hypoxic - secondary to volume overload from CHF, obesity and CLINT. Needs better compliance with CPAP/BiPap. Continue diuresis.       8. Obstructive sleep apnea - not compliant with CPAP. IPAP 16 and EPAP 6.     9. DM type II, insulin independent - uncontrolled. HbA1c 11.9. Will need insulin at DC for better control. Lantus and SSI.     10. CZUS-lclqbh-jjdpwjhb inhaled steroids bronchodilators.     11. Morbid Obesity (Body mass index is 43.14 kg/m². ) - Complicating assessment and treatment. Placing patient at risk for multiple co-morbidities as well as early death and contributing to the patient's presentation. Counseled on weight loss.      12. Chronic venous insufficiency - with stasis dermatitis venous ulcers - continue diuretics and Ace wraps.     13. Peripheral vascular disease - status post prior angioplasty of multiple vessels: right renal artery, right SFA. Status post right femoropopliteal bypass. Continue ASA, statin. Resume plavix.       14. Paroxysmal atrial flutter, fibrillation - currently in sinus rhythm and rate controlled. Continue BB. Resume eliquis. Tele.     15. Acute urinary retention 7/3/21- improved with Lees placement. Can DC at SNF and do voiding trial once more active.      16. Anxiety - mood stable. Held some sedating medications due to lethargy.       17. Left knee pain - Xray showed mild patellofemoral compartment osteoarthritis with small joint effusion. Improved.      18. Encephalopathy - unsure of etiology.  Possibly from polypharmacy from sedating medications that are prescribed. Holding sedating medications. Untreated CLINT and increased CO2 may be contributing. ABG in am reviewed. Improved after better compliance with BiPAP.        Physical Exam Performed:     /63   Pulse 88   Temp 98.8 °F (37.1 °C) (Oral)   Resp 16   Ht 5' 4\" (1.626 m)   Wt 250 lb (113.4 kg)   LMP 10/24/2012   SpO2 96%   BMI 42.91 kg/m²       General appearance: NAD. Sitting in chair and conversant. HEENT: Pupils equal, round, and reactive to light. Conjunctivae/corneas clear. Neck: Supple. Respiratory:  Crackles BL. Cardiovascular: Regular rate and rhythm with normal S1/S2 without murmurs, rubs or gallops. Abdomen: Soft, non-tender, non-distended with normal bowel sounds. Musculoskeletal: No clubbing, cyanosis. Generalized weakness. Pitting edema BL LE. Skin: warm and dry. Neurologic:  No focal deficits. Psychiatric:  Answers simple questions. Labs: For convenience and continuity at follow-up the following most recent labs are provided:      CBC:    Lab Results   Component Value Date    WBC 5.8 07/14/2021    HGB 9.5 07/14/2021    HCT 28.5 07/14/2021     07/14/2021       Renal:    Lab Results   Component Value Date     07/14/2021    K 3.1 07/14/2021    CL 90 07/14/2021    CO2 33 07/14/2021    BUN 75 07/14/2021    CREATININE 2.0 07/14/2021    CALCIUM 9.6 07/14/2021    PHOS 5.4 07/06/2021         Significant Diagnostic Studies    Radiology:   XR CHEST PORTABLE   Final Result   1. Right internal jugular vein catheter tip is located in the region of the   SVC. No pneumothorax. 2.  Worsening bilateral lung infiltrates, likely related to pulmonary edema. IR NONTUNNELED VASCULAR CATHETER > 5 YEARS   Final Result   Successful ultrasound and fluoroscopy guided non-tunneled central venous   catheter placement. Catheter okay for use. XR KNEE LEFT (1-2 VIEWS)   Final Result   1.  No acute osseous abnormality. Decreased bone mineral density limits the   sensitivity of the exam.   2. Mild patellofemoral compartment osteoarthritis with small joint effusion. XR CHEST PORTABLE   Final Result   No acute cardiopulmonary disease. Stable cardiomegaly. Consults:     IP CONSULT TO SOCIAL WORK  IP CONSULT TO NEPHROLOGY  IP CONSULT TO CARDIOLOGY    Disposition:  SNF     Condition at Discharge: Stable    Discharge Instructions/Follow-up:  PCP, Cardiology, Nephrology    Code Status:  Full Code     Activity: activity as tolerated    Diet: cardiac diet and diabetic diet      Discharge Medications:     Discharge Medication List as of 7/14/2021  1:46 PM           Details   miconazole (MICOTIN) 2 % powder Apply topically 2 times daily. , Disp-45 g, R-1, DC to SNF      torsemide (DEMADEX) 10 MG tablet Take 5 tablets by mouth daily, Disp-30 tablet, R-3Print      potassium chloride (KLOR-CON M) 20 MEQ extended release tablet Take 2 tablets by mouth daily, Disp-60 tablet, R-3DC to SNF              Details   apixaban (ELIQUIS) 5 MG TABS tablet Take 1 tablet by mouth 2 times daily, Disp-60 tabletDC to SNF      metoprolol tartrate (LOPRESSOR) 25 MG tablet Take 1 tablet by mouth 2 times daily, Disp-60 tablet, R-3DC to SNF              Details   tiotropium (SPIRIVA RESPIMAT) 2.5 MCG/ACT AERS inhaler INHALE 2 PUFFS BY MOUTH INTO THE LUNGS DAILY, Disp-4 g, R-5Normal      Insulin Degludec (TRESIBA FLEXTOUCH) 100 UNIT/ML SOPN Inject 3 Units into the skin nightly, Disp-10 pen, R-5Normal      Cholecalciferol (VITAMIN D) 25 MCG TABS Take 1 tablet by mouth daily, Disp-60 tablet, R-1Labeling may look different. 25 mcg=1000 Units. Please double check dosages. Normal      QUEtiapine (SEROQUEL) 25 MG tablet Take 1 tablet by mouth nightly, Disp-30 tablet, R-0Normal      Blood Glucose Monitoring Suppl (ONE TOUCH ULTRA 2) w/Device KIT Disp-1 kit, R-0, NormalUSE TO MONITOR BLOOD GLUCOSE LEVELS      ONE TOUCH ULTRASOFT LANCETS MISC Disp-150 each, R-3, NormalUSE TO TEST BLOOD GLUCOSE LEVELS 4 TIMES DAILY      !! blood glucose test strips (ASCENSIA AUTODISC VI;ONE TOUCH ULTRA TEST VI) strip Disp-150 each, R-3, NormalUSE TO MONITOR BLOOD GLUCOSE LEVELS 4 TIMES DAILY      budesonide-formoterol (SYMBICORT) 160-4.5 MCG/ACT AERO Inhale 2 puffs into the lungs 2 times daily, Disp-1 Inhaler, R-5Normal      nitroGLYCERIN (NITROSTAT) 0.4 MG SL tablet Place 1 tablet under the tongue every 5 minutes as needed for Chest pain up to max of 3 total doses. If no relief after 1 dose, call 911., Disp-25 tablet, R-0Normal      Insulin Pen Needle (B-D ULTRAFINE III SHORT PEN) 31G X 8 MM MISC Disp-200 each, R-5, NormalFive shots a day      atorvastatin (LIPITOR) 80 MG tablet TAKE 1 TABLET BY MOUTH EVERY DAY AT NIGHT, Disp-90 tablet, R-3DX Code Needed  . Normal      !! blood glucose test strips (FREESTYLE LITE) strip USE TO CHECK BLOOD GLUCOSE LEVELS FOUR TIMES DAILY, Disp-200 strip,R-10Normal      benztropine (COGENTIN) 1 MG tablet Take 1 mg by mouth nightly Historical Med      INSULIN SYRINGE .5CC/29G 29G X 1/2\" 0.5 ML MISC DAILY Starting Tue 12/3/2019, Disp-100 each, R-3, Normal      pantoprazole (PROTONIX) 40 MG tablet Take 1 tablet by mouth 2 times daily, Disp-60 tablet, R-0NO PRINT      clopidogrel (PLAVIX) 75 MG tablet TAKE 1 TABLET BY MOUTH EVERY DAY, Disp-30 tablet, R-5Normal      magnesium oxide (MAG-OX) 400 (240 Mg) MG tablet TAKE 1 TABLET ONCE DAILY, Disp-30 tablet, R-11Normal      busPIRone (BUSPAR) 30 MG tablet Take 30 mg by mouth 2 times daily Historical Med      lamoTRIgine (LAMICTAL) 200 MG tablet Take 200 mg by mouth 2 times daily Historical Med       !! - Potential duplicate medications found. Please discuss with provider. Time Spent on discharge is more than 45 minutes in the examination, evaluation, counseling and review of medications and discharge plan.       Signed:    Chetan Mendez MD   7/14/2021      Thank you Linda De La Rosa, ELIAS for the opportunity to be involved in this patient's care. If you have any questions or concerns please feel free to contact me at [phone].

## 2021-07-14 NOTE — CARE COORDINATION
Per discussion with care team at 1100 huddle, pt will be ready for discharge today. Transport arranged with [facility] at 1400. Primary nurse and Dr Kaye Lujan made aware. GRACIE called and updated Lupe at [address] regarding d/c and transport eta.

## 2021-07-14 NOTE — PROGRESS NOTES
Department of Internal Medicine  Nephrology Progress Note        SUBJECTIVE:    We are following this patient for CKD management. Feels better than yesterday   cr 2 k 3.1    ROS: No fever or chills. Social:  family at bedside. Scheduled Meds:   [Held by provider] spironolactone  25 mg Oral Daily    torsemide  50 mg Oral Daily    potassium chloride  40 mEq Oral BID    metoprolol tartrate  25 mg Oral BID    miconazole   Topical BID    apixaban  5 mg Oral BID    atorvastatin  80 mg Oral Nightly    benztropine  1 mg Oral Nightly    budesonide-formoterol  2 puff Inhalation BID    busPIRone  30 mg Oral BID    clopidogrel  75 mg Oral Daily    [Held by provider] gabapentin  300 mg Oral TID    lamoTRIgine  200 mg Oral BID    magnesium oxide  400 mg Oral Daily    pantoprazole  40 mg Oral BID    QUEtiapine  25 mg Oral Nightly    tiotropium  2 puff Inhalation Daily    insulin glargine  0.15 Units/kg Subcutaneous Nightly    insulin lispro  0.05 Units/kg Subcutaneous TID WC    insulin lispro  0-6 Units Subcutaneous TID WC    insulin lispro  0-3 Units Subcutaneous Nightly     Continuous Infusions:   dextrose      sodium chloride       PRN Meds:.magnesium sulfate, potassium chloride, povidone-iodine, glucose, dextrose, glucagon (rDNA), dextrose, sodium chloride flush, sodium chloride, ondansetron **OR** ondansetron, acetaminophen **OR** acetaminophen      Physical Exam:    VITALS:  /63   Pulse 88   Temp 98.8 °F (37.1 °C) (Oral)   Resp 16   Ht 5' 4\" (1.626 m)   Wt 250 lb (113.4 kg)   LMP 10/24/2012   SpO2 96%   BMI 42.91 kg/m²     General appearance: Seems comfortable, no acute distress. Eyes: Conjunctivae pink, reactive pupils. Neck: Trachea midline, thyroid normal.   Lungs:  Non labored breathing, CTA to anterior auscultation. Heart:  S1S2 normal, rub or gallop. trace peripheral edema w ace wrap on  Abdomen: Soft, non-tender, no organomegaly.    Skin: No lesions or rashes, warm to touch.       DATA:    CBC:   Lab Results   Component Value Date    WBC 5.8 07/14/2021    RBC 3.32 07/14/2021    HGB 9.5 07/14/2021    HCT 28.5 07/14/2021    MCV 85.9 07/14/2021    MCH 28.6 07/14/2021    MCHC 33.3 07/14/2021    RDW 16.8 07/14/2021     07/14/2021    MPV 8.3 07/14/2021     BMP:    Lab Results   Component Value Date     07/14/2021    K 3.1 07/14/2021    CL 90 07/14/2021    CO2 33 07/14/2021    BUN 75 07/14/2021    LABALBU 3.7 07/06/2021    CREATININE 2.0 07/14/2021    CALCIUM 9.6 07/14/2021    GFRAA 31 07/14/2021    LABGLOM 26 07/14/2021    GLUCOSE 104 07/14/2021              Assessment/     Chronic Kidney Disease:  Stage IV  - No proteinuria.  Multiple episodes of SHAUNNA  - Highly variable depending on cardiac and volume status but overall worsening      Chronic Hypotension:  Impacts pressure natriuresis      Hyponatremia:  Hypervolemic / Due to CHF              On tolvaptan 3 days a week     Systolic Heart Failure:  EF = 25%              Worsening edema and says she had gained 30 lbs but on room air     Plan/   cont Torsemide 100 mg daily--> change to 50 mg daily      Metolazone 5 mg three times per week--> hold     Hold aldactone with plans to resume it once k, cr stabilizes     PRN potassium replacement- po kcl 40 meq times 2   Monitor serial labs      Follow up with dr Katie Lopez

## 2021-07-20 NOTE — ED NOTES
This RN educated patient on returning to the ED for continuation of care, educating the patient on risk of leaving AMA at this time. Patient still requests to leave AMA. AMA paper signed by patient with witness, IV removed and dressing applied, and all questions were addressed at this time. Patient is alert and oriented x4, and walked to the lobby with a stable gait.       Norris Ulloa RN  07/20/21 [id number]

## 2021-07-20 NOTE — ED PROVIDER NOTES
I independently examined and evaluated [facility]. In brief, patient is a 43-year-old female presenting with concerns for chest pain of her back, shortness of breath, and palpitations. States that this episode feels similar to when she has had heart attacks in the past however this episode was actually worse. She received aspirin prior to arrival.    Focused exam revealed patient resting comfortably, no acute distress. Heart regular rate and rhythm. Lungs clear to auscultation bilaterally. Abdomen soft, nondistended, nontender. No peripheral edema. No calf swelling or tenderness. Labs Reviewed   CBC WITH AUTO DIFFERENTIAL - Abnormal; Notable for the following components:       Result Value    RBC 3.28 (*)     Hemoglobin 9.2 (*)     Hematocrit 28.3 (*)     RDW 17.1 (*)     Lymphocytes Absolute 0.8 (*)     All other components within normal limits    Narrative:     Performed at:  [facility]. [facility]  [address] [place] [address]. [facility], [address] [place] [address]   Phone [phone]   COMPREHENSIVE METABOLIC PANEL W/ REFLEX TO MG FOR LOW K - Abnormal; Notable for the following components:    Sodium 134 (*)     Chloride 94 (*)     Glucose 204 (*)     BUN 62 (*)     CREATININE 2.4 (*)     GFR Non- 21 (*)     GFR  25 (*)     Total Bilirubin 2.2 (*)     Alkaline Phosphatase 332 (*)     All other components within normal limits    Narrative:     Performed at:  [facility]. [facility]  [address] [place] [address]. [facility], [address] [place] [address]   Phone [phone]   TROPONIN - Abnormal; Notable for the following components:    Troponin 0.06 (*)     All other components within normal limits    Narrative:     Performed at:  [facility]. [facility]  [address] [place] [address].  [place], [address] [place] [address]   Phone [phone]   BRAIN NATRIURETIC PEPTIDE - Abnormal; Notable for the following components:    Pro-BNP 4,856 (*) All other components within normal limits    Narrative:     Performed at:  [facility]. [facility]  [address] [facility],  [place] [address]. [place], [address]   Phone [phone]     XR CHEST (2 VW)   Final Result   No radiographic evidence of acute pulmonary disease. The Ekg interpreted by me shows atrial flutter with a rate of 117  Axis is   Left axis deviation  QTc is  prolonged at 496  Intervals and Durations are unremarkable. ST Segments: nonspecific changes  No significant change from prior EKG dated 7/2/2021    ED course: Patient is a 28-year-old female, history of coronary artery disease, hypertension, hyperlipidemia, diabetes, COPD and CHF presenting with concerns for chest pain that occurred with exertion prior to arrival.  Found to be in atrial flutter, similar EKG as compared to previous. Patient was seen in conjunction with the midlevel practitioner, please refer to his note for further details of the patient's work-up and treatment. Troponin is elevated at 1.06, she does have a chronic troponin elevation although this is higher than her previous. Plan was to admit patient to [facility] for further cardiac evaluation. Findings were discussed with the patient and she is requesting to leave [address] She is alert and oriented and has capacity to make this decision. I had a discussion with the patient regarding risks of leaving [address] as well as benefits of hospitalization. Risks were discussed including but not limited to MI, cardiac arrhythmia, stroke, permanent disability, and even death and patient understands these risks and would still like to leave [address] She is advised that she return to the ER at any point in time should she desire further evaluation or hospitalization for her condition. She is comfortable in agreement with plan of care. Given return precautions.     All diagnostic, treatment, and disposition decisions were made by myself in conjunction with the advanced practice provider. For all further details of the patient's emergency department visit, please see the advanced practice provider's documentation. 1. Chest pain, unspecified type    2. Elevated troponin    3. Left against medical advice        Comment: Please note this report has been produced using speech recognition software and may contain errors related to that system including errors in grammar, punctuation, and spelling, as well as words and phrases that may be inappropriate. If there are any questions or concerns please feel free to contact the dictating provider for clarification.        Eric Watts MD  07/20/21 8949

## 2021-07-20 NOTE — ED NOTES
MD made this writer aware that patient has requested to leave AMA at this time.      Yudy Bill RN  07/20/21 9707

## 2021-07-20 NOTE — ED PROVIDER NOTES
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 Corina Kelly - at [address]  01/20/2020    Dr. Nataliia Bliss      pancreatitis post surgery    CORONARY ANGIOPLASTY WITH STENT PLACEMENT  03/16/2018    MELODY- 3.5 x 38 and 3.5 x 20 to Cx    CORONARY ANGIOPLASTY WITH STENT PLACEMENT  01/03/2018    MELODY- 3.0 x 38 and 2.75 x 26 and 2.75 x 8 and 2.75 x 22 to the LAD    CORONARY ANGIOPLASTY WITH STENT PLACEMENT  10/07/2019    MELODY- 2.5 x 8 to [address] GRAFT N/A 1/27/2020    CORONARY ARTERY BYPASS GRAFTING X 1, ON PUMP BEATING HEART, INTERNAL MAMMARY ARTERY TO LEFT ANTERIOR DESCENDING ARTERY, VAS CATH INSERTION, URI, PLATELET GEL APPLICATION, 5 LEVEL BILATERAL INTERCOSTAL NERVE BLOCK, STERNAL PLATING performed by Brenden Cat MD at [address] Right 5/16/2019    fem-pop, performed by Alyssa Sparrow MD at [address]  02/14/2019    CardioMEMs insertion for CHF    IR NONTUNNELED VASCULAR CATHETER  7/9/2021    IR NONTUNNELED VASCULAR CATHETER 7/9/2021 [facility]    ROTATOR CUFF REPAIR Left 04/22/2016    TONSILLECTOMY      TRANSESOPHAGEAL ECHOCARDIOGRAM  01/26/2020    TRANSLUMINAL ANGIOPLASTY Left 10/08/2019 with stenting, at [facility]   [address] Left 04/29/2020    of the [facility] re-occlusion    TUMOR EXCISION      benign tumors X 2 chest & axilla    UPPER GASTROINTESTINAL ENDOSCOPY  4/25/2013    BX barretts, HH, gastritis    UPPER GASTROINTESTINAL ENDOSCOPY  12/10/2015    UPPER GASTROINTESTINAL ENDOSCOPY  01/06/2017    Gastritis    VASCULAR SURGERY Left 12/08/2015    upper extremity and mesenteric angiogram (diagnostic only)    VASCULAR SURGERY Bilateral 07/07/2020    diagnostic lower extremity angiogram only    VASCULAR SURGERY Left 08/04/2020    angioplasty and stent of renal artery    VASCULAR SURGERY Left 08/05/2020    coil embolization of branch renal artery vessel for bleeding    VASCULAR SURGERY Left 09/01/2020    angioplasty and stenting of subclavian artery     Family History   Problem Relation Age of Onset    Heart Disease Maternal Grandmother     Cancer Mother         lung and brain cancer    Cancer Maternal Aunt         lung and brain cancer     Social History     Socioeconomic History    Marital status: Single     Spouse name: Not on file    Number of children: Not on file    Years of education: Not on file    Highest education level: Not on file   Occupational History    Not on file   Tobacco Use    Smoking status: Current Every Day Smoker     Packs/day: 1.00     Years: 30.00     Pack years: 30.00     Types: Cigarettes    Smokeless tobacco: Never Used   Vaping Use    Vaping Use: Never used   Substance and Sexual Activity    Alcohol use: No     Comment: quit 2006     Drug use: Never    Sexual activity: Yes     Partners: Male   Other Topics Concern    Not on file   Social History Narrative    Not on file     Social Determinants of Health     Financial Resource Strain:     Difficulty of Paying Living Expenses:    Food Insecurity:     Worried About Running Out of Food in the Last Year:     [address] in the Last Year:    Transportation Needs:     Lack of Transportation (Medical):  Lack of Transportation (Non-Medical):    Physical Activity:     Days of Exercise per Week:     Minutes of Exercise per Session:    Stress:     Feeling of Stress :    Social Connections:     Frequency of Communication with Friends and Family:     Frequency of Social Gatherings with Friends and Family:     Attends [employer] Services:     Active Member of Clubs or Organizations:     Attends Club or Organization Meetings:     Marital Status:    Intimate Partner Violence:     Fear of Current or Ex-Partner:     Emotionally Abused:     Physically Abused:     Sexually Abused:      No current facility-administered medications for this encounter. Current Outpatient Medications   Medication Sig Dispense Refill    acetaminophen (TYLENOL) 650 MG extended release tablet Take 650 mg by mouth every 4 hours as needed for Pain      Umeclidinium Bromide (INCRUSE ELLIPTA) 62.5 MCG/INH AEPB Inhale 1 Dose into the lungs daily      fluticasone-salmeterol (ADVAIR) 250-50 MCG/DOSE AEPB Inhale 1 puff into the lungs 2 times daily      apixaban (ELIQUIS) 5 MG TABS tablet Take 1 tablet by mouth 2 times daily 60 tablet     metoprolol tartrate (LOPRESSOR) 25 MG tablet Take 1 tablet by mouth 2 times daily 60 tablet 3    miconazole (MICOTIN) 2 % powder Apply topically 2 times daily.  45 g 1    torsemide (DEMADEX) 10 MG tablet Take 5 tablets by mouth daily 30 tablet 3    potassium chloride (KLOR-CON M) 20 MEQ extended release tablet Take 2 tablets by mouth daily 60 tablet 3    Insulin Degludec (TRESIBA FLEXTOUCH) 100 UNIT/ML SOPN Inject 3 Units into the skin nightly 10 pen 5    Cholecalciferol (VITAMIN D) 25 MCG TABS Take 1 tablet by mouth daily 60 tablet 1    QUEtiapine (SEROQUEL) 25 MG tablet Take 1 tablet by mouth nightly 30 tablet 0    Blood Glucose Monitoring Suppl (ONE TOUCH ULTRA 2) w/Device KIT USE TO MONITOR BLOOD GLUCOSE LEVELS 1 kit 0    ONE TOUCH ULTRASOFT LANCETS [facility] USE TO TEST BLOOD GLUCOSE LEVELS 4 TIMES DAILY 150 each 3    blood glucose test strips (ASCENSIA AUTODISC VI;ONE TOUCH ULTRA TEST VI) strip USE TO MONITOR BLOOD GLUCOSE LEVELS 4 TIMES DAILY 150 each 3    nitroGLYCERIN (NITROSTAT) 0.4 MG SL tablet Place 1 tablet under the tongue every 5 minutes as needed for Chest pain up to max of 3 total doses. If no relief after 1 dose, call 911. 25 tablet 0    Insulin Pen Needle (B-D ULTRAFINE III SHORT PEN) 31G X 8 MM MISC Five shots a day 200 each 5    atorvastatin (LIPITOR) 80 MG tablet TAKE 1 TABLET BY MOUTH EVERY DAY AT NIGHT 90 tablet 3    blood glucose test strips (FREESTYLE LITE) strip USE TO CHECK BLOOD GLUCOSE LEVELS FOUR TIMES DAILY 200 strip 10    benztropine (COGENTIN) 1 MG tablet Take 1 mg by mouth nightly       INSULIN SYRINGE .5CC/29G 29G X 1/2\" 0.5 ML MISC 1 each by Does not apply route daily 100 each 3    pantoprazole (PROTONIX) 40 MG tablet Take 1 tablet by mouth 2 times daily (Patient taking differently: Take 20 mg by mouth 2 times daily ) 60 tablet 0    clopidogrel (PLAVIX) 75 MG tablet TAKE 1 TABLET BY MOUTH EVERY DAY 30 tablet 5    magnesium oxide (MAG-OX) 400 (240 Mg) MG tablet TAKE 1 TABLET ONCE DAILY 30 tablet 11    busPIRone (BUSPAR) 30 MG tablet Take 30 mg by mouth 2 times daily       lamoTRIgine (LAMICTAL) 200 MG tablet Take 200 mg by mouth 2 times daily        Allergies   Allergen Reactions    Lisinopril Other (See Comments)     Severe hypotension       REVIEW OF SYSTEMS  10 systems reviewed, pertinent positives per HPI otherwise noted to be negative    PHYSICAL EXAM  /85   Pulse 98   Temp 98.4 °F (36.9 °C) (Oral)   Resp 18   Ht 5' 1.5\" (1.562 m)   Wt 234 lb (106.1 kg)   LMP 10/24/2012   SpO2 99%   BMI 43.50 kg/m²   GENERAL APPEARANCE: Awake and alert. Cooperative. No distress. HEAD: Normocephalic. Atraumatic. EYES: PERRL. EOM's grossly intact. ENT: Mucous membranes are moist.   NECK: Supple. HEART: RRR. No murmurs.   LUNGS: Respirations unlabored. CTAB. Good air exchange. Speaking comfortably in full sentences. ABDOMEN: Soft. Non-distended. Non-tender. No guarding or rebound. No masses. No organomegaly. EXTREMITIES: No peripheral edema. Moves all extremities equally. All extremities neurovascularly intact. SKIN: Warm and dry. No acute rashes. NEUROLOGICAL: Alert and oriented. CN's 2-12 intact. No gross facial drooping. Strength 5/5, sensation intact. PSYCHIATRIC: Normal mood and affect. RADIOLOGY  XR CHEST (2 VW)   Final Result   No radiographic evidence of acute pulmonary disease. LABS  Labs Reviewed   CBC WITH AUTO DIFFERENTIAL - Abnormal; Notable for the following components:       Result Value    RBC 3.28 (*)     Hemoglobin 9.2 (*)     Hematocrit 28.3 (*)     RDW 17.1 (*)     Lymphocytes Absolute 0.8 (*)     All other components within normal limits    Narrative:     Performed at:  [facility]. [facility]  [address] [place] [address]. [place], [address]   Phone [phone]   COMPREHENSIVE METABOLIC PANEL W/ REFLEX TO MG FOR LOW K - Abnormal; Notable for the following components:    Sodium 134 (*)     Chloride 94 (*)     Glucose 204 (*)     BUN 62 (*)     CREATININE 2.4 (*)     GFR Non- 21 (*)     GFR  25 (*)     Total Bilirubin 2.2 (*)     Alkaline Phosphatase 332 (*)     All other components within normal limits    Narrative:     Performed at:  [facility]. [facility]  [address] [place] [address]. [place], [address]   Phone [phone]   TROPONIN - Abnormal; Notable for the following components:    Troponin 0.06 (*)     All other components within normal limits    Narrative:     Performed at:  [facility]. [facility]  [address] [place] [address].  [address]   Phone [phone]   BRAIN NATRIURETIC PEPTIDE - Abnormal; Notable for the following components:    Pro-BNP 4,856 (*) All other components within normal limits    Narrative:     Performed at:  [facility]. [facility]  [address] [facility],  [facility] [address] [facility], [address]    Phone [phone]       PROCEDURES  Unless otherwise noted below, none  Procedures    ED COURSE    ED Course as of Jul 23 0450   Tue Jul 20, 2021   1847 Appears near baseline. Pro-BNP(!): 3,067 [MM]      ED Course User Index  [MM] Nicholas Pimentel PA-C           MDM  MDM  Number of Diagnoses or Management Options     Amount and/or Complexity of Data Reviewed  Clinical lab tests: reviewed and ordered  Tests in the radiology section of CPT®: ordered  Tests in the medicine section of CPT®: ordered and reviewed  Decide to obtain previous medical records or to obtain history from someone other than the patient: yes  Review and summarize past medical records: yes  Discuss the patient with other providers: yes    Risk of Complications, Morbidity, and/or Mortality  Presenting problems: high  Management options: high      Patient is a 28-year-old femaleHFrEF (25% 4/21); Afib (Eliquis); CAD, COPD, DM, CKD4; morbid obesity; presented emergency department for evaluation of chest pain. Pain was brought on by exertion relieved with rest.  Arrival to the ED patient was asymptomatic. She received aspirin via EMS. Patient was placed on a cardiac monitor. Initial lab work demonstrated a BUN/creatinine of 62-2 0.4 which is above patient's baseline. Patient also had a troponin of 0.06 which appears elevated from her baseline. Given her history of coronary artery disease as well as HFrEF; I recommended that she be admitted to the hospital service. I placed a consult to have the patient transferred to an accepting facility for cardiac evaluation. Given patient's story, risk factors as well as laboratory findings patient has a high risk of major adverse cardiac event with next 6 weeks.   Medical recommendation for this patient would be admission to the [facility] for cardiac evaluation. I discussed this case with the attending physician who agreed to evaluate the patient. Upon that discussion patient requested to be discharged against medical ice. Risk of leaving [address] were discussed with the patient including but not limited to death and permanent disability by the attending physician. Patient is alert and oriented and of sound mind. Was discussed with the patient that she does have the right to refuse admission however our medical recommendation would be admission with cardiac evaluation. Given her extensive history I recommended that she contact her cardiologist upon discharge for evaluation as soon as possible. Patient was encouraged to return the emergency department at any time if her symptoms would return. Patient was able to verbally reciprocate the risks of leaving [address] Patient ultimately signed out [address] with encouragement to return she would reconsider evaluation. DISPOSITION  Patient left AGAINST MEDICAL ADVICE    CLINICAL IMPRESSION  1. Chest pain, unspecified type    2. Elevated troponin    3.  Left against medical advice           Mitali Magallanes PA-C  07/23/21 [id number]

## 2021-07-21 NOTE — TELEPHONE ENCOUNTER
Rao an NP from the [facility] called and is requesting a call from [facility] to discuss plan of care and what occurred at pts ER visit yesterday. Pt signed herself out ZEFERINO Yeh can be reached @ [phone].

## 2021-07-26 PROBLEM — A41.9 SEPSIS (HCC): Status: ACTIVE | Noted: 2021-01-01

## 2021-07-26 NOTE — PROGRESS NOTES
4 Eyes Admission Assessment     I agree as the admission nurse that 2 RN's have performed a thorough Head to Toe Skin Assessment on the patient. ALL assessment sites listed below have been assessed on admission. Areas assessed by both nurses:   [x]   Head, Face, and Ears   [x]   Shoulders, Back, and Chest  [x]   Arms, Elbows, and Hands   [x]   Coccyx, Sacrum, and Ischium  [x]   Legs, Feet, and Heels        Does the Patient have Skin Breakdown?   Yes a wound was noted on the Admission Assessment and an LDA was Initiated documentation include the Shaye-wound, Wound Assessment, Measurements, Dressing Treatment, Drainage, and Color\",         Vivek Prevention initiated:  Yes   Wound Care Orders initiated:  No      WOC nurse consulted for Pressure Injury (Stage 3,4, Unstageable, DTI, NWPT, and Complex wounds) or Vivek score 18 or lower:  NA      Nurse 1 eSignature: Electronically signed by Paulett Siemens, RN on 7/26/21 at 6:10 PM EDT    **SHARE this note so that the co-signing nurse is able to place an eSignature**    Nurse 2 eSignature: Electronically signed by Dick Prather RN on 7/27/21 at 1:36 PM EDT

## 2021-07-26 NOTE — ED NOTES
Bed: 02  Expected date:   Expected time:   Means of arrival:   Comments:  -[facility]       Juli Heck RN  07/26/21 3931

## 2021-07-26 NOTE — PROGRESS NOTES
Pt's daughter, Allie Solorio", updated on mom's condition and well being and visitiation regulations. All questions answered at this time and daughter verbalized understanding. She states she is currently having issues with her cell phone at the moment and can temporarily be reached at [phone] (her BF's #).

## 2021-07-26 NOTE — Clinical Note
Patient Class: Inpatient [101]   REQUIRED: Diagnosis: Sepsis ([facility] [[id number]]   Estimated Length of Stay: Estimated stay of more than 2 midnights   Admitting Provider: Shlomo Denise [[id number]]   Telemetry/Cardiac Monitoring Required?: Yes

## 2021-07-26 NOTE — PROGRESS NOTES
Cardiology consult called, left message on voicemail, 7/26/21 @ [address]    Nephrology consult, Spoke with Dr. Noy Matos, 7/26/21 @ [address]    Pulmonology consult called to answering service, 7/26/21 @ [address]

## 2021-07-26 NOTE — ED NOTES
Pt now A & O x 3. Resting with OU closed, resps even and unlab.  Denies c/o's     Bernard RAYMUNDO Harper  07/26/21 0185

## 2021-07-26 NOTE — ED NOTES
Pt noted to be intermittently more alert to self and place.  Denies c/o's     Annamaria Stewart RN  07/26/21 6176

## 2021-07-26 NOTE — PROGRESS NOTES
Admit to ICU  Sepsis, hypovolemic shock  CHF, ischemic cardiomyopathy  SHAUNNA on CKD    Esperanza ROSENBERGP-C  7/26/2021

## 2021-07-26 NOTE — ED NOTES
[facility] here to transport pt.  Pt remains alert and without c/o's      Modesta Wilder RN  07/26/21 3287

## 2021-07-26 NOTE — PROGRESS NOTES
Pt arrived to ICU bed 9 at 1730 this shift via stretcher (EMS) with Epinephrine infusing at 10mcg/min (30mls/hr). Pt's bp stable at this time. Temperature 96.5 per indwelling roche--robyn hugger applied. Pt alert and oriented x4, c/o headache but no other significant pain. Upon arrival CVC in right groin completely saturated with blood, bed pad, gown, etc. Josh, RN, to change CVC dressing and applied pressure until hemostasis achieved. Orders to STAT EKG placed d/t very wide QRS complex with multiple PVC's and a rate of 110's to 150's. See image for interpretation. Otherwise, pt oriented to room, RN, call light and visitation hours . Pt pleasant, agreeable, and verbalized understanding. RN to call and update daughter, [place]. Bed alarm on, call light within reach, will continue to monitor. Full assessment to follow.

## 2021-07-26 NOTE — CONSULTS
[facility] Consult Note           Reason for Consult:  Acute, chronic kidney disease  Requesting Physician:  Dr. Rosibel Raman    Chief Complaint:  Altered mental status  History Obtained From:  patient, electronic medical record    History of Present Ilness:    60year old female with CKD-4, sCHF, DM, CAD, COPD, Afib, HTN admitted with altered mental status. We have been asked to assist in further mgmt of her A/CKD. Brought in from SNF due to altered mental status. Hypotensive on admission, given IVF bolus, started on vancomycin, zosyn, and pressors for BP support. SCr up to 5.4, urine output of 225 ml since admission. Feeling weak, no fevers, has chronic shortness of breath. Denies any chest pain or abdominal pain.     Past Medical History:        Diagnosis Date    Acute blood loss anemia 8/5/2020    Acute kidney injury ([address] 12/25/2019    Acute kidney injury superimposed on CKD ([address] 8/5/2020    Acute on chronic combined systolic and diastolic congestive heart failure ([address] 1/10/2020    Acute on chronic right-sided heart failure ([address] 08/2020    Alcohol dependence ([address] 3/10/2010    Angina pectoris (HCC)     Arthritis     Asthma     Atrial fibrillation (HCC)     CAD (coronary artery disease)     Cardiomyopathy ([address]     Cellulitis 6/3/2020    Charcot's joint of ankle, left     Chronic kidney disease     COPD (chronic obstructive pulmonary disease) ([address]     Diabetic ulcer of left foot associated with type 2 diabetes mellitus, limited to breakdown of skin ([address] 1/18/2020    Diabetic ulcer of toe of right foot associated with type 2 diabetes mellitus ([address] 1/18/2020    Enthesopathy     GERD (gastroesophageal reflux disease)     Hyperlipidemia     Hypertension     Left renal artery stenosis ([address] 08/2020    MI (myocardial infarction) ([address] 10/07/2019    NSTEMI    Morbid obesity (HCC)     Osteoarthritis     Peripheral neuropathy     12/10/2015    UPPER GASTROINTESTINAL ENDOSCOPY  01/06/2017    Gastritis    VASCULAR SURGERY Left 12/08/2015    upper extremity and mesenteric angiogram (diagnostic only)    VASCULAR SURGERY Bilateral 07/07/2020    diagnostic lower extremity angiogram only    VASCULAR SURGERY Left 08/04/2020    angioplasty and stent of renal artery    VASCULAR SURGERY Left 08/05/2020    coil embolization of branch renal artery vessel for bleeding    VASCULAR SURGERY Left 09/01/2020    angioplasty and stenting of subclavian artery       Home Medications:    No current facility-administered medications on file prior to encounter. Current Outpatient Medications on File Prior to Encounter   Medication Sig Dispense Refill    acetaminophen (TYLENOL) 650 MG extended release tablet Take 650 mg by mouth every 4 hours as needed for Pain      Umeclidinium Bromide (INCRUSE ELLIPTA) 62.5 MCG/INH AEPB Inhale 1 Dose into the lungs daily      fluticasone-salmeterol (ADVAIR) 250-50 MCG/DOSE AEPB Inhale 1 puff into the lungs 2 times daily      apixaban (ELIQUIS) 5 MG TABS tablet Take 1 tablet by mouth 2 times daily 60 tablet     metoprolol tartrate (LOPRESSOR) 25 MG tablet Take 1 tablet by mouth 2 times daily 60 tablet 3    miconazole (MICOTIN) 2 % powder Apply topically 2 times daily.  45 g 1    torsemide (DEMADEX) 10 MG tablet Take 5 tablets by mouth daily 30 tablet 3    potassium chloride (KLOR-CON M) 20 MEQ extended release tablet Take 2 tablets by mouth daily 60 tablet 3    Insulin Degludec (TRESIBA FLEXTOUCH) 100 UNIT/ML SOPN Inject 3 Units into the skin nightly 10 pen 5    Cholecalciferol (VITAMIN D) 25 MCG TABS Take 1 tablet by mouth daily 60 tablet 1    QUEtiapine (SEROQUEL) 25 MG tablet Take 1 tablet by mouth nightly 30 tablet 0    Blood Glucose Monitoring Suppl (ONE TOUCH ULTRA 2) w/Device KIT USE TO MONITOR BLOOD GLUCOSE LEVELS 1 kit 0    ONE TOUCH ULTRASOFT LANCETS MISC USE TO TEST BLOOD GLUCOSE LEVELS 4 TIMES DAILY 150 each 3    blood glucose test strips (ASCENSIA AUTODISC VI;ONE TOUCH ULTRA TEST VI) strip USE TO MONITOR BLOOD GLUCOSE LEVELS 4 TIMES DAILY 150 each 3    nitroGLYCERIN (NITROSTAT) 0.4 MG SL tablet Place 1 tablet under the tongue every 5 minutes as needed for Chest pain up to max of 3 total doses.  If no relief after 1 dose, call 911. 25 tablet 0    Insulin Pen Needle (B-D ULTRAFINE III SHORT PEN) 31G X 8 MM MISC Five shots a day 200 each 5    atorvastatin (LIPITOR) 80 MG tablet TAKE 1 TABLET BY MOUTH EVERY DAY AT NIGHT 90 tablet 3    blood glucose test strips (FREESTYLE LITE) strip USE TO CHECK BLOOD GLUCOSE LEVELS FOUR TIMES DAILY 200 strip 10    benztropine (COGENTIN) 1 MG tablet Take 1 mg by mouth nightly       INSULIN SYRINGE .5CC/29G 29G X 1/2\" 0.5 ML MISC 1 each by Does not apply route daily 100 each 3    pantoprazole (PROTONIX) 40 MG tablet Take 1 tablet by mouth 2 times daily (Patient taking differently: Take 20 mg by mouth 2 times daily ) 60 tablet 0    clopidogrel (PLAVIX) 75 MG tablet TAKE 1 TABLET BY MOUTH EVERY DAY 30 tablet 5    magnesium oxide (MAG-OX) 400 (240 Mg) MG tablet TAKE 1 TABLET ONCE DAILY 30 tablet 11    busPIRone (BUSPAR) 30 MG tablet Take 30 mg by mouth 2 times daily       lamoTRIgine (LAMICTAL) 200 MG tablet Take 200 mg by mouth 2 times daily          Allergies:  Lisinopril    Social History:    Social History     Socioeconomic History    Marital status: Single     Spouse name: Not on file    Number of children: Not on file    Years of education: Not on file    Highest education level: Not on file   Occupational History    Not on file   Tobacco Use    Smoking status: Current Every Day Smoker     Packs/day: 1.00     Years: 30.00     Pack years: 30.00     Types: Cigarettes    Smokeless tobacco: Never Used   Vaping Use    Vaping Use: Never used   Substance and Sexual Activity    Alcohol use: No     Comment: quit 2006     Drug use: Never    Sexual activity: Yes

## 2021-07-26 NOTE — ED NOTES
Report called to Wyatt Hensley @ [facility] ICU in SBAR format.  Pt to ambulance via cot without incident     Viral Hannah RN  07/26/21 0821

## 2021-07-26 NOTE — ED PROVIDER NOTES
[facility] ICU  EMERGENCY DEPARTMENT ENCOUNTER        Pt Name: Kanwal Hernández  MRN: [id number]  Shannongfjenna 1965  Date of evaluation: 7/26/2021  Provider: Elvin Rincon MD  PCP: Janet Campos PA-C  ED Attending: Elvin Rincon MD    CHIEF COMPLAINT       Chief Complaint   Patient presents with    Altered Mental Status     Pt with AMS per ECF. LKW yesterday afternoon       HISTORY OF PRESENT ILLNESS   (Location/Symptom, Timing/Onset, Context/Setting, Quality, Duration, Modifying Factors, Severity)  Note limiting factors. Kanwal Hernández is a 60 y.o. female who presents to the emergency department via EMS after altered mental status. Last known normal was \"yesterday afternoon\". Report was called in by her skilled nursing facility. Patient has apparently been picking at her skin wounds excessively for the last several days. Staff there was concerned. She was not acting appropriately. Staff apparently had outpatient lab work obtained that showed a drop of her hemoglobin from somewhere around 11 to now down to 8.2 as of yesterday. Because of her confusion EMS was summoned who then brought the patient in. Patient's blood pressure was in the 59L systolically. The most EMS could get from her was her staring at the ceiling of the ambulance. Review of the patient's records indicates recent admission to both [facility] as well as [facility] secondary to hypercarbic respiratory failure, acute on chronic kidney disease, CHF, COPD. Patient apparently signed out [address] approximately 2 weeks ago after being evaluated for chest pain. History is obtained from EMS, nursing report. REVIEW OF SYSTEMS    (2-9 systems for level 4, 10 or more for level 5)     Review of Systems   Unable to perform ROS: Mental status change       Positives and Pertinent negatives as per HPI. Except as noted above in the ROS, all other systems were reviewed and negative.        PAST MEDICAL HISTORY Past Medical History:   Diagnosis Date    Acute blood loss anemia 8/5/2020    Acute kidney injury ([address] 12/25/2019    Acute kidney injury superimposed on CKD ([address] 8/5/2020    Acute on chronic combined systolic and diastolic congestive heart failure ([address] 1/10/2020    Acute on chronic right-sided heart failure ([address] 08/2020    Alcohol dependence ([address] 3/10/2010    Angina pectoris (HCC)     Arthritis     Asthma     Atrial fibrillation (HCC)     CAD (coronary artery disease)     Cardiomyopathy ([address]     Cellulitis 6/3/2020    Charcot's joint of ankle, left     Chronic kidney disease     COPD (chronic obstructive pulmonary disease) ([address]     Diabetic ulcer of left foot associated with type 2 diabetes mellitus, limited to breakdown of skin ([address] 1/18/2020    Diabetic ulcer of toe of right foot associated with type 2 diabetes mellitus ([address] 1/18/2020    Enthesopathy     GERD (gastroesophageal reflux disease)     Hyperlipidemia     Hypertension     Left renal artery stenosis ([address] 08/2020    MI (myocardial infarction) ([address] 10/07/2019    NSTEMI    Morbid obesity ([address]     Osteoarthritis     Peripheral neuropathy     Peripheral vascular disease ([address]     Polyarthritis     Positive FIT (fecal immunochemical test) 2/28/2020    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 steal syndrome 09/01/2020    Traumatic perinephric hematoma, left, initial encounter 8/4/2020         SURGICAL HISTORY     Past Surgical History:   Procedure Laterality Date    ARTERIAL BYPASS SURGRY Left 01/06/2016    Axillary/Subclavian to Brachial Bypass w/reversed SVG    CARDIAC CATHETERIZATION  03/27/2018    Dr. Levander Gowers - at [address]  01/20/2020    Dr. Inessa Ambrose      pancreatitis post surgery    CORONARY ANGIOPLASTY WITH STENT PLACEMENT  03/16/2018    MELODY- 3.5 x 38 and 3.5 x 20 to Cx    CORONARY ANGIOPLASTY WITH STENT PLACEMENT  01/03/2018 MELODY- 3.0 x 38 and 2.75 x 26 and 2.75 x 8 and 2.75 x 22 to the LAD    CORONARY ANGIOPLASTY WITH STENT PLACEMENT  10/07/2019    MELODY- 2.5 x 8 to [address] GRAFT N/A 1/27/2020    CORONARY ARTERY BYPASS GRAFTING X 1, ON PUMP BEATING HEART, INTERNAL MAMMARY ARTERY TO LEFT ANTERIOR DESCENDING ARTERY, VAS CATH INSERTION, URI, PLATELET GEL APPLICATION, 5 LEVEL BILATERAL INTERCOSTAL NERVE BLOCK, STERNAL PLATING performed by Jaciel Delgado MD at [address] Right 5/16/2019    fem-pop, performed by Beau Tyler MD at [address]  02/14/2019    CardioMEMs insertion for CHF    IR NONTUNNELED VASCULAR CATHETER  7/9/2021    IR NONTUNNELED VASCULAR CATHETER 7/9/2021 [facility]    ROTATOR CUFF REPAIR Left 04/22/2016    TONSILLECTOMY      TRANSESOPHAGEAL ECHOCARDIOGRAM  01/26/2020    TRANSLUMINAL ANGIOPLASTY Left 10/08/2019    with stenting, at [facility]    [address] Left 04/29/2020    of the [facility] re-occlusion    TUMOR EXCISION      benign tumors X 2 chest & axilla    UPPER GASTROINTESTINAL ENDOSCOPY  4/25/2013    BX barretts, HH, gastritis    UPPER GASTROINTESTINAL ENDOSCOPY  12/10/2015    UPPER GASTROINTESTINAL ENDOSCOPY  01/06/2017    Gastritis    VASCULAR SURGERY Left 12/08/2015    upper extremity and mesenteric angiogram (diagnostic only)    VASCULAR SURGERY Bilateral 07/07/2020    diagnostic lower extremity angiogram only    VASCULAR SURGERY Left 08/04/2020    angioplasty and stent of renal artery    VASCULAR SURGERY Left 08/05/2020    coil embolization of branch renal artery vessel for bleeding    VASCULAR SURGERY Left 09/01/2020    angioplasty and stenting of subclavian artery         CURRENTMEDICATIONS       Current Discharge Medication List      CONTINUE these medications which have NOT CHANGED    Details   acetaminophen (TYLENOL) 650 MG extended release tablet Take 650 mg by mouth every 4 hours as needed for Pain Umeclidinium Bromide (INCRUSE ELLIPTA) 62.5 MCG/INH AEPB Inhale 1 Dose into the lungs daily      fluticasone-salmeterol (ADVAIR) 250-50 MCG/DOSE AEPB Inhale 1 puff into the lungs 2 times daily      apixaban (ELIQUIS) 5 MG TABS tablet Take 1 tablet by mouth 2 times daily  Qty: 60 tablet      metoprolol tartrate (LOPRESSOR) 25 MG tablet Take 1 tablet by mouth 2 times daily  Qty: 60 tablet, Refills: 3      miconazole (MICOTIN) 2 % powder Apply topically 2 times daily. Qty: 45 g, Refills: 1      torsemide (DEMADEX) 10 MG tablet Take 5 tablets by mouth daily  Qty: 30 tablet, Refills: 3      potassium chloride (KLOR-CON M) 20 MEQ extended release tablet Take 2 tablets by mouth daily  Qty: 60 tablet, Refills: 3      Insulin Degludec (TRESIBA FLEXTOUCH) 100 UNIT/ML SOPN Inject 3 Units into the skin nightly  Qty: 10 pen, Refills: 5    Associated Diagnoses: Diabetes mellitus type 2 in obese ([facility])      Cholecalciferol (VITAMIN D) 25 MCG TABS Take 1 tablet by mouth daily  Qty: 60 tablet, Refills: 1    Comments: Labeling may look different. 25 mcg=1000 Units. Please double check dosages. QUEtiapine (SEROQUEL) 25 MG tablet Take 1 tablet by mouth nightly  Qty: 30 tablet, Refills: 0      Blood Glucose Monitoring Suppl (ONE TOUCH ULTRA 2) w/Device KIT USE TO MONITOR BLOOD GLUCOSE LEVELS  Qty: 1 kit, Refills: 0    Associated Diagnoses: Type 2 diabetes mellitus with diabetic polyneuropathy, with long-term current use of insulin ([facility])      ONE TOUCH ULTRASOFT LANCETS MISC USE TO TEST BLOOD GLUCOSE LEVELS 4 TIMES DAILY  Qty: 150 each, Refills: 3    Associated Diagnoses: Type 2 diabetes mellitus with diabetic polyneuropathy, with long-term current use of insulin ([facility]      ! ! blood glucose test strips (ASCENSIA AUTODISC VI;ONE TOUCH ULTRA TEST VI) strip USE TO MONITOR BLOOD GLUCOSE LEVELS 4 TIMES DAILY  Qty: 150 each, Refills: 3    Associated Diagnoses: Type 2 diabetes mellitus with diabetic polyneuropathy, with long-term current use of insulin ([facility])      nitroGLYCERIN (NITROSTAT) 0.4 MG SL tablet Place 1 tablet under the tongue every 5 minutes as needed for Chest pain up to max of 3 total doses. If no relief after 1 dose, call 911. Qty: 25 tablet, Refills: 0      Insulin Pen Needle (B-D ULTRAFINE III SHORT PEN) 31G X 8 MM MISC Five shots a day  Qty: 200 each, Refills: 5    Associated Diagnoses: Diabetes mellitus type 2 in obese ([facility])      atorvastatin (LIPITOR) 80 MG tablet TAKE 1 TABLET BY MOUTH EVERY DAY AT NIGHT  Qty: 90 tablet, Refills: 3    Comments: DX Code Needed  . Associated Diagnoses: Mixed hyperlipidemia; Coronary artery disease involving native coronary artery of native heart with angina pectoris ([facility]); S/P CABG (coronary artery bypass graft)      !! blood glucose test strips (FREESTYLE LITE) strip USE TO CHECK BLOOD GLUCOSE LEVELS FOUR TIMES DAILY  Qty: 200 strip, Refills: 10    Associated Diagnoses: Diabetes mellitus type 2 in obese ([facility])      benztropine (COGENTIN) 1 MG tablet Take 1 mg by mouth nightly       INSULIN SYRINGE .5CC/29G 29G X 1/2\" 0.5 ML MISC 1 each by Does not apply route daily  Qty: 100 each, Refills: 3    Associated Diagnoses: Diabetes mellitus type 2 in obese ([facility])      pantoprazole (PROTONIX) 40 MG tablet Take 1 tablet by mouth 2 times daily  Qty: 60 tablet, Refills: 0      clopidogrel (PLAVIX) 75 MG tablet TAKE 1 TABLET BY MOUTH EVERY DAY  Qty: 30 tablet, Refills: 5      magnesium oxide (MAG-OX) 400 (240 Mg) MG tablet TAKE 1 TABLET ONCE DAILY  Qty: 30 tablet, Refills: 11      busPIRone (BUSPAR) 30 MG tablet Take 30 mg by mouth 2 times daily       lamoTRIgine (LAMICTAL) 200 MG tablet Take 200 mg by mouth 2 times daily        ! ! - Potential duplicate medications found. Please discuss with provider.             ALLERGIES     Lisinopril    FAMILYHISTORY       Family History   Problem Relation Age of Onset    Heart Disease Maternal Grandmother     Cancer Mother         lung and brain cancer    Cancer Maternal Aunt         lung and brain cancer          SOCIAL HISTORY       Social History     Socioeconomic History    Marital status: Single     Spouse name: None    Number of children: None    Years of education: None    Highest education level: None   Occupational History    None   Tobacco Use    Smoking status: Current Every Day Smoker     Packs/day: 1.00     Years: 30.00     Pack years: 30.00     Types: Cigarettes    Smokeless tobacco: Never Used   Vaping Use    Vaping Use: Never used   Substance and Sexual Activity    Alcohol use: No     Comment: quit 2006     Drug use: Never    Sexual activity: Yes     Partners: Male   Other Topics Concern    None   Social History Narrative    None     Social Determinants of Health     Financial Resource Strain:     Difficulty of Paying Living Expenses:    Food Insecurity:     Worried About Running Out of Food in the Last Year:     Ran Out of Food in the Last Year:    Transportation Needs:     Lack of Transportation (Medical):  Lack of Transportation (Non-Medical):    Physical Activity:     Days of Exercise per Week:     Minutes of Exercise per Session:    Stress:     Feeling of Stress :    Social Connections:     Frequency of Communication with Friends and Family:     Frequency of Social Gatherings with Friends and Family:     Attends Confucianist Services:     Active Member of Clubs or Organizations:     Attends Club or Organization Meetings:     Marital Status:    Intimate Partner Violence:     Fear of Current or Ex-Partner:     Emotionally Abused:     Physically Abused:     Sexually Abused:        SCREENINGS   NIH Stroke Scale  Interval: Baseline  Level of Consciousness (1a. ): Alert  LOC Questions (1b. ):  Answers both correctly  LOC Commands (1c. ): Performs both tasks correctly  Best Gaze (2. ): Normal  Visual (3. ): No visual loss  Facial Palsy (4. ): Normal symmetrical movement  Motor Arm, Left (5a. ): No drift  Motor Arm, Right (5b. ): No drift  Motor Leg, Left (6a. ): No drift  Motor Leg, Right (6b. ): No drift  Limb Ataxia (7. ): Absent  Sensory (8. ): Normal  Best Language (9. ): No aphasia  Dysarthria (10. ): Normal  Extinction and Inattention (11): No abnormality  Total: 0Glasgow Coma Scale  Eye Opening: To pain  Best Verbal Response: None  Best Motor Response: Withdraws from pain  Center Coma Scale Score: 7        PHYSICAL EXAM    (up to 7 for level 4, 8 or more for level 5)     ED Triage Vitals   BP Temp Temp src Pulse Resp SpO2 Height Weight   -- -- -- -- -- -- -- --       Physical Exam  Vitals and nursing note reviewed. Constitutional:       General: She is awake. She is not in acute distress. Appearance: She is well-developed. She is morbidly obese. She is ill-appearing. She is not toxic-appearing or diaphoretic. HENT:      Head: Normocephalic and atraumatic. Right Ear: External ear normal.      Left Ear: External ear normal.      Nose: Nose normal.      Mouth/Throat:      Pharynx: No oropharyngeal exudate. Eyes:      General: No scleral icterus. Conjunctiva/sclera: Conjunctivae normal.      Pupils: Pupils are equal, round, and reactive to light. Cardiovascular:      Rate and Rhythm: Normal rate and regular rhythm. Heart sounds: Normal heart sounds. No murmur heard. No friction rub. No gallop. Pulmonary:      Effort: Pulmonary effort is normal. No respiratory distress. Breath sounds: Normal breath sounds. No wheezing. Abdominal:      General: Bowel sounds are normal. There is no distension. Palpations: Abdomen is soft. Tenderness: There is no abdominal tenderness. There is no guarding or rebound. Musculoskeletal:         General: No tenderness. Normal range of motion. Cervical back: Normal range of motion and neck supple. Lymphadenopathy:      Cervical: No cervical adenopathy. Skin:     General: Skin is warm and dry.       Capillary Refill: Capillary refill takes less than 2 seconds. Findings: Lesion present. No rash. Comments: Patient has scattered open wounds in various stages of healing consistent with skin picking. Several areas of excoriation as well. Neurological:      Mental Status: She is confused. GCS: GCS eye subscore is 4. GCS verbal subscore is 4. GCS motor subscore is 6. Cranial Nerves: No dysarthria or facial asymmetry. Motor: No tremor or seizure activity. Comments: Patient will follow me around the exam room with her eyes when asked. She told me \"hi\" when I introduced myself to her. When I asked the patient if she was having pain she stated \"yes pain\" however would not elaborate any further. I have seen her move her upper extremities but not her lower extremities. I am unable to otherwise complete an NIH stroke score.          DIAGNOSTIC RESULTS   LABS:    Results for orders placed or performed during the hospital encounter of 07/26/21   COVID-19, Rapid    Specimen: Nasopharyngeal Swab; Throat   Result Value Ref Range    SARS-CoV-2, NAAT Not Detected Not Detected   Lactate, Sepsis   Result Value Ref Range    Lactic Acid, Sepsis 3.3 (H) 0.4 - 1.9 mmol/L   Lactate, Sepsis   Result Value Ref Range    Lactic Acid, Sepsis 4.6 (HH) 0.4 - 1.9 mmol/L   Blood gas, venous   Result Value Ref Range    pH, Christopher 7.345 (L) 7.350 - 7.450    pCO2, Christopher 31.5 (L) 40.0 - 50.0 mmHg    pO2, Christopher 52.9 (H) 25 - 40 mmHg    HCO3, Venous 16.8 (L) 23.0 - 29.0 mmol/L    Base Excess, Christopher -7.9 (L) -3.0 - 3.0 mmol/L    O2 Sat, Christopher 86 Not Established %    Carboxyhemoglobin 2.9 (H) 0.0 - 1.5 %    MetHgb, Christopher 0.3 <1.5 %    TC02 (Calc), Christopher 18 Not Established mmol/L    O2 Content, Christopher 12 Not Established VOL %    O2 Therapy Unknown    CBC auto differential   Result Value Ref Range    WBC 7.6 4.0 - 11.0 K/uL    RBC 3.28 (L) 4.00 - 5.20 M/uL    Hemoglobin 8.9 (L) 12.0 - 16.0 g/dL    Hematocrit 28.0 (L) 36.0 - 48.0 %    MCV 85.5 80.0 - 100.0 fL    MCH 27.1 26.0 - 34.0 pg MCHC 31.7 31.0 - 36.0 g/dL    RDW 17.4 (H) 12.4 - 15.4 %    Platelets 527 274 - 913 K/uL    MPV 9.3 5.0 - 10.5 fL    Neutrophils % 80.3 %    Lymphocytes % 9.2 %    Monocytes % 9.6 %    Eosinophils % 0.3 %    Basophils % 0.6 %    Neutrophils Absolute 6.1 1.7 - 7.7 K/uL    Lymphocytes Absolute 0.7 (L) 1.0 - 5.1 K/uL    Monocytes Absolute 0.7 0.0 - 1.3 K/uL    Eosinophils Absolute 0.0 0.0 - 0.6 K/uL    Basophils Absolute 0.0 0.0 - 0.2 K/uL   Comprehensive Metabolic Panel w/ Reflex to MG   Result Value Ref Range    Sodium 128 (L) 136 - 145 mmol/L    Potassium reflex Magnesium 5.3 (H) 3.5 - 5.1 mmol/L    Chloride 90 (L) 99 - 110 mmol/L    CO2 18 (L) 21 - 32 mmol/L    Anion Gap 20 (H) 3 - 16    Glucose 188 (H) 70 - 99 mg/dL    BUN 93 (HH) 7 - 20 mg/dL    CREATININE 5.4 (HH) 0.6 - 1.1 mg/dL    GFR Non-African American 8 (A) >60    GFR  10 (A) >60    Calcium 9.3 8.3 - 10.6 mg/dL    Total Protein 7.3 6.4 - 8.2 g/dL    Albumin 4.1 3.4 - 5.0 g/dL    Albumin/Globulin Ratio 1.3 1.1 - 2.2    Total Bilirubin 3.3 (H) 0.0 - 1.0 mg/dL    Alkaline Phosphatase 393 (H) 40 - 129 U/L     (H) 10 - 40 U/L     (H) 15 - 37 U/L    Globulin 3.2 g/dL   Urinalysis   Result Value Ref Range    Color, UA DARK YELLOW (A) Straw/Yellow    Clarity, UA SL CLOUDY (A) Clear    Glucose, Ur Negative Negative mg/dL    Bilirubin Urine SMALL (A) Negative    Ketones, Urine TRACE (A) Negative mg/dL    Specific Gravity, UA 1.025 1.005 - 1.030    Blood, Urine TRACE-INTACT (A) Negative    pH, UA 5.0 5.0 - 8.0    Protein, UA 30 (A) Negative mg/dL    Urobilinogen, Urine 1.0 <2.0 E.U./dL    Nitrite, Urine Negative Negative    Leukocyte Esterase, Urine SMALL (A) Negative    Microscopic Examination YES     Urine Type NotGiven    Troponin   Result Value Ref Range    Troponin 0.05 (H) <0.01 ng/mL   Brain Natriuretic Peptide   Result Value Ref Range    Pro-BNP 7,507 (H) 0 - 124 pg/mL   Microscopic Urinalysis   Result Value Ref Range Mucus, UA Rare (A) None Seen /LPF    WBC, UA 3-5 0 - 5 /HPF    RBC, UA 11-20 (A) 0 - 4 /HPF    Epithelial Cells, UA 2-5 0 - 5 /HPF    Bacteria, UA 1+ (A) None Seen /HPF   Ethanol   Result Value Ref Range    Ethanol Lvl None Detected mg/dL   Drug screen multi urine   Result Value Ref Range    Amphetamine Screen, Urine Neg Negative <1000ng/mL    Barbiturate Screen, Ur Neg Negative <200 ng/mL    Benzodiazepine Screen, Urine Neg Negative <200 ng/mL    Cannabinoid Scrn, Ur Neg Negative <50 ng/mL    Cocaine Metabolite Screen, Urine Neg Negative <300 ng/mL    Opiate Scrn, Ur Neg Negative <300 ng/mL    PCP Screen, Urine Neg Negative <25 ng/mL    Methadone Screen, Urine Neg Negative <300 ng/mL    Propoxyphene Scrn, Ur Neg Negative <300 ng/mL    Oxycodone Urine Neg Negative <100 ng/ml    pH, UA 5.0     Drug Screen Comment: see below    Troponin   Result Value Ref Range    Troponin 0.04 (H) <0.01 ng/mL   POCT Glucose   Result Value Ref Range    POC Glucose 192 (H) 70 - 99 mg/dl    Performed on ACCU-CHEK    POCT Glucose   Result Value Ref Range    POC Glucose 286 (H) 70 - 99 mg/dl    Performed on ACCU-CHEK    POCT Glucose   Result Value Ref Range    POC Glucose 261 (H) 70 - 99 mg/dl    Performed on ACCU-CHEK    EKG 12 lead   Result Value Ref Range    Ventricular Rate 82 BPM    Atrial Rate 82 BPM    P-R Interval 224 ms    QRS Duration 42 ms    Q-T Interval 336 ms    QTc Calculation (Bazett) 392 ms    P Axis 65 degrees    R Axis 0 degrees    T Axis 97 degrees    Diagnosis        Age and gender specific ECG analysis Wide QRS rhythm undeterminedUnusual P axis, possible ectopic atrial rhythmLeft atrial enlargementIndeterminate axisRight bundle branch blockLeft axis deviationLeft anterior fascicular blockAbnormal ECGWhen compared with ECG of 7/20/21QRS wide duration increasedConfirmed by GUNJAN CHAUHAN MD ([id number]) on 7/26/2021 5:36:30 PM   EKG 12 Lead   Result Value Ref Range    Ventricular Rate 116 BPM    Atrial Rate 129 BPM    P-R Interval 194 ms    QRS Duration 6 ms    Q-T Interval 380 ms    QTc Calculation (Bazett) 528 ms    R Axis 0 degrees    T Axis 82 degrees    Diagnosis       Sinus tachycardia with frequent Premature ventricular complexes and Fusion complexesIndeterminate axisPulmonary disease patternNonspecific ST and T wave abnormalityAbnormal ECGWhen compared with ECG of 26-JUL-2021 17:47, (unconfirmed)Previous ECG has undetermined rhythm, needs reviewT wave inversion now evident in Inferior leads       All other labs were within normal range ornot returned as of this dictation. EKG: All EKG's are interpreted by the Emergency Department Physician who either signs or Co-signs this chart in the absence of a cardiologist.  Please see their note for interpretation of EKG. EKG Interpretation    Interpreted by emergency department physician    Rhythm: wide complex tachycardia  Rate: tachycardia  Axis: normal  Ectopy: none  Conduction: right bundle branch block (complete) and left anterior fasciclar block  ST Segments: nonspecific changes  T Waves: non specific changes  Q Waves: none    Clinical Impression: wide complex tachycardia, non-specific st and t wave changes, right bundle branch block, left anterior fascicular block, no changes as compared to prior EKG from several days ago. RADIOLOGY:   Non-plain film images such as CT, Ultrasound and MRI are read by the radiologist.  Plain radiographic images are visualized and preliminarily interpreted by the ED Provider with the belowfindings:    Interpretation per the Radiologist below, if available at the time of this note:    XR CHEST PORTABLE   Final Result   1. Stable cardiomegaly. 2. Low lung volumes. CT head without contrast   Final Result   No acute intracranial abnormality.          XR ABDOMEN (KUB) (SINGLE AP VIEW)    (Results Pending)         PROCEDURES   Unless otherwise noted below, none     Central Line    Date/Time: 7/26/2021 1:59 PM  Performed by: Gracie Najjar Jay Batista MD  Authorized by: Jong Neri MD     Consent:     Consent obtained:  Emergent situation    Alternatives discussed:  Observation and alternative treatment  Pre-procedure details:     Hand hygiene: Hand hygiene performed prior to insertion      Sterile barrier technique: All elements of maximal sterile technique followed      Skin preparation:  2% chlorhexidine    Skin preparation agent: Skin preparation agent completely dried prior to procedure    Anesthesia (see MAR for exact dosages): Anesthesia method:  Local infiltration    Local anesthetic:  Lidocaine 2% w/o epi  Procedure details:     Location:  R femoral    Site selection rationale:  Patient may require vas-cath placement precluding IJ/subclavian placement. Patient position:  Flat    Procedural supplies:  Triple lumen    Catheter size:  8 Fr    Landmarks identified: yes      Ultrasound guidance: yes      Sterile ultrasound techniques: Sterile gel and sterile probe covers were used      Number of attempts:  1    Successful placement: yes    Post-procedure details:     Post-procedure:  Dressing applied and line sutured    Assessment:  Blood return through all ports and free fluid flow    Patient tolerance of procedure: Tolerated well, no immediate complications  Comments:      Patient had extensive oozing of dark red venous blood from around the CVC. Surgifoam placed in addition to bio-patch.         CRITICAL CARE TIME   N/A    CONSULTS:  IP CONSULT TO NEPHROLOGY  IP CONSULT TO CRITICAL CARE  IP CONSULT TO HOSPITALIST  IP CONSULT TO CARDIOLOGY  IP CONSULT TO NEPHROLOGY  IP CONSULT TO PULMONOLOGY      EMERGENCY DEPARTMENT COURSE and DIFFERENTIAL DIAGNOSIS/MDM:   Vitals:    Vitals:    07/26/21 1740 07/26/21 1750 07/26/21 1800 07/26/21 1810   BP: 91/78 (!) 117/96  (!) 96/58   Pulse: 106 127 114 115   Resp: 26 (!) 31 25 27   Temp:       TempSrc:       SpO2: 100% 100% 100% 100%   Weight:       Height:           Patient was given the following medications:  Medications   norepinephrine (LEVOPHED) 16 mg in dextrose 5 % 250 mL infusion (5 mcg/min Intravenous New Bag 7/26/21 1823)   miconazole (MICOTIN) 2 % powder (has no administration in time range)   pantoprazole (PROTONIX) injection 40 mg (has no administration in time range)   sodium chloride flush 0.9 % injection 5-40 mL (has no administration in time range)   sodium chloride flush 0.9 % injection 5-40 mL (has no administration in time range)   0.9 % sodium chloride infusion (has no administration in time range)   ondansetron (ZOFRAN-ODT) disintegrating tablet 4 mg (has no administration in time range)     Or   ondansetron (ZOFRAN) injection 4 mg (has no administration in time range)   polyethylene glycol (GLYCOLAX) packet 17 g (has no administration in time range)   acetaminophen (TYLENOL) tablet 650 mg (has no administration in time range)     Or   acetaminophen (TYLENOL) suppository 650 mg (has no administration in time range)   insulin lispro (HUMALOG) injection vial 0-6 Units (3 Units Subcutaneous Given 7/26/21 1833)   glucose (GLUTOSE) 40 % oral gel 15 g (has no administration in time range)   dextrose 50 % IV solution (has no administration in time range)   glucagon (rDNA) injection 1 mg (has no administration in time range)   dextrose 5 % solution (has no administration in time range)   vasopressin 20 Units in dextrose 5 % 100 mL infusion (has no administration in time range)   NONFORMULARY 10 g (has no administration in time range)   0.9 % sodium chloride IV bolus 3,183 mL (0 mL/kg × 106.1 kg Intravenous Stopped 7/26/21 1449)   piperacillin-tazobactam (ZOSYN) 4,500 mg in sodium chloride 0.9 % 100 mL IVPB (mini-bag) (0 mg Intravenous Stopped 7/26/21 1347)   vancomycin (VANCOCIN) 1,500 mg in dextrose 5 % 500 mL IVPB (0 mg/kg × 71.8 kg (Adjusted) Intravenous Stopped 7/26/21 1547)       ED Course as of Jul 26 1841   Mon Jul 26, 2021   1359 Central venous catheter placed to the right femoral vein. [TC]   1434 Patient accepted by Alyssa Sweet NP for the hospitalist service. BP improved on epi drip. Fluids still infusing. [TC]   1453 Patient discussed with Dr. Dunia Baldwin from nephrology. He has agreed to consult on the patient. [TC]      ED Course User Index  [TC] Jules Howard MD     Patient is critically ill on arrival.  IV access was obtained and the patient was given a sepsis bundle fluid bolus. I empirically started the patient on Zosyn and vancomycin per the sepsis bundle for undifferentiated sepsis. Patient did not respond significantly to IV fluid. Initially I ordered a Levophed drip however unfortunately we did not have any in our dispenser. We then switch the patient to an epinephrine drip. This has begun to improve her blood pressure. Patient was monitored throughout her stay for development of pulmonary edema given the amount of fluid that has been required. No evidence of pulmonary edema clinically. Her mental status has improved some during her stay. Patient was found to be hyperkalemic and hyponatremic. Her work-up otherwise shows acute renal failure. Case was discussed with nephrology who has agreed to consult. Consult was placed with pulmonary critical care however they responded to the transfer center. Hospitalist service has agreed to accept admission. Patient was updated on several occasions. Differential diagnosis included but was not limited to: stroke, TIA, intracranial bleed, trauma, infection/sepsis, medication side effect, intoxication/withdrawal, metabolic/electrolyte abnormalities. FINAL IMPRESSION      1. Acute renal failure, unspecified acute renal failure type ([address]    2. Acute encephalopathy    3. Hypovolemic shock ([address]    4. Acute hyponatremia    5.  Acute hyperkalemia          DISPOSITION/PLAN   DISPOSITION Admitted 07/26/2021 02:47:25 PM    (Please note that portions of this note were completed with a voice recognition program. Efforts were made to edit the dictations but occasionally words are mis-transcribed.)    Yessenia Sherwood MD(electronically signed)              Yessenia Sherwood MD  07/26/21 0106

## 2021-07-26 NOTE — PROGRESS NOTES
FYI:  Additive QT interval prolongation may occur during Zofran therapy. Most adverse events occur when the QTC > 500. Patient's QTC = 528. Compazine has lesser effects on the QT interval and can be ordered as an alternative to Zofran. Observe for signs and symptoms.   Presley Joseph R.Ph.7/26/20216:12 PM

## 2021-07-26 NOTE — ED NOTES
Pt to ER with decreased LOC. Upon arrival to ER pt alert to pain only. Cardiac monitor and O2 @ 2L/NC applied, resps even and unlab.  Dr Hang Hebert @ bedside     Amy Ewign RN  07/26/21 6551

## 2021-07-26 NOTE — ED NOTES
Pt now alert and asking \"am I being admitted to [facility]. ?\" Plan of care reviewed with pt via this nurse. Pt verbalizes understanding. Denies c/o's.  John brown applied     Nicho Wagner RN  07/26/21 7025

## 2021-07-27 NOTE — PROGRESS NOTES
Vanc-Day 2  Vanc random this am, 14.6  Srcr 4.9  Will hold dose for now, level in am. Continue to pulse dose  Sanjay Baird Pharm D 7/27/202111:29 AM  .

## 2021-07-27 NOTE — PROGRESS NOTES
[facility] Progress Note           Subjective/   60y.o. year old female who we are seeing in consultation for A/CKD-4. HPI:  Renal function slow to improve, urine output of 540 ml over last 24 hours. Bp's better, off pressors now. ROS:  Denies any shortness of breath, no fevers, +weak.     Objective/   GEN:  Chronically ill, /61   Pulse 90   Temp 98.2 °F (36.8 °C) (Bladder)   Resp 27   Ht 5' 1.5\" (1.562 m)   Wt 234 lb (106.1 kg)   LMP 10/24/2012   SpO2 95%   BMI 43.50 kg/m²   HEENT: non-icteric, no JVD  CV: S1, S2 without m/r/g; +++ LE edema  RESP: CTA B without w/r/r; breathing wnl  ABD: +bs, soft, nt, no hsm  SKIN: warm, no rashes    Data/  Recent Labs     07/26/21  1228 07/27/21  0445   WBC 7.6 9.2   HGB 8.9* 8.0*   HCT 28.0* 25.5*   MCV 85.5 85.2    330     Recent Labs     07/26/21  1817 07/26/21  2226 07/27/21  0445   * 124* 126*   K 6.1* 5.1 4.8   CL 89* 91* 92*   CO2 12* 16* 19*   GLUCOSE 255* 183* 161*   PHOS 6.7*  --   --    MG 2.80*  --   --    BUN 83* 85* 87*   CREATININE 4.8* 4.9* 4.9*   LABGLOM 9* 9* 9*   GFRAA 11* 11* 11*       Assessment/         - Acute kidney injury - ATN in setting of suspected septic shock     - Chronic Kidney Disease:  Stage IV             Etiology: Multiple episodes of SHAUNNA, background vascular disease             Highly variable SCr depending on cardiac and volume status but overall worsening              SCr low to mid 2 range at it best, last at 2.4 this month     - Chronic Hypotension:  Impacts pressure natriuresis      - Anion-gap metabolic acidosis - in setting of elevated lactate & SHAUNNA     - Hyponatremia:  Hypervolemic / Due to CHF              On tolvaptan 3 days a week                 - Systolic Heart Failure:  EF = 25%                 - Hyperkalemia - better with medical treatment with lokelma       Plan/     - Needs to start dialysis now - d/w Dr. Fawad Roberts  - Radiology consult for Alem Mckeon [address] placement - eliquis & plavix on hold  - Holding on further IVF's & diuretics on hold  - Trend labs  - Plan to start dialysis once TDC placed    ____________________________________  Kristen Berrios MD  [facility]  [email or web address]  Office: [phone]

## 2021-07-27 NOTE — PROGRESS NOTES
H/p dictation id [id number]. Date of service 07/26/2021. Septic shock. Acute encephalopathy. Acute on ckd III. Cad. Combined CHF.

## 2021-07-27 NOTE — CONSULTS
Pharmacy Note  Vancomycin Consult    Michael Lopez is a 60 y.o. female started on Vancomycin for sepsis; consult received from Dr. Edwin Haider to manage therapy. Also receiving the following antibiotics: Cefepime.     Patient Active Problem List   Diagnosis    Type 2 diabetes mellitus with diabetic polyneuropathy, with long-term current use of insulin (HCC)    Essential hypertension    CLINT (obstructive sleep apnea)    Tobacco abuse disorder    GERD (gastroesophageal reflux disease)    Anxiety and depression    Hyponatremia    Iron deficiency anemia    CAD (coronary artery disease)    Mitral valve regurgitation    COPD (chronic obstructive pulmonary disease) (HCC)    SHAUNNA (acute kidney injury) ([address]    Vitamin D deficiency    Dyslipidemia    Abel's esophagus    Acute on chronic congestive heart failure (HCC)    Viral hepatitis C    Pulmonary nodule    Class 2 severe obesity due to excess calories with serious comorbidity and body mass index (BMI) of 39.0 to 39.9 in adult ([address]    Bipolar disorder ([address]    Pulmonary hypertension (HCC)    Lower extremity edema    Habitual self-excoriation    Ulcer of left lower leg, limited to breakdown of skin ([facility])    Ulcer of right leg, limited to breakdown of skin ([address]    Arthritis    Cardiomyopathy ([address]    Chronic systolic CHF (congestive heart failure) (HCC)    Chronic renal impairment    Peripheral venous insufficiency    PAD (peripheral artery disease) ([address]    Acute kidney injury superimposed on CKD ([address]    Morbid obesity with BMI of 40.0-44.9, adult (HCC)    CKD (chronic kidney disease) stage 4, GFR 15-29 ml/min ([facility])    Hypokalemia    Dyspnea on exertion    Ischemic heart disease    Moderate protein-calorie malnutrition (HCC)    DKA, type 2, not at goal [facility])    Acute on chronic systolic CHF (congestive heart failure) (HCC)    Acute systolic CHF (congestive heart failure) (HCC)    CHF (congestive heart failure), NYHA class I, acute on chronic, combined ([address] Cellulitis    Closed nondisplaced fracture of navicular bone of left foot    Heart failure (HCC)    Generalized weakness    Sepsis (HCC)     Allergies:  Lisinopril     Temp max: 96.5 degrees F    Recent Labs     07/26/21  1228 07/26/21  1817   BUN 93* 83*   CREATININE 5.4* 4.8*   WBC 7.6  --        Intake/Output Summary (Last 24 hours) at 7/26/2021 2228  Last data filed at 7/26/2021 1810  Gross per 24 hour   Intake 3282.92 ml   Output 225 ml   Net 3057.92 ml     Culture Date      Source                       Results      Ht Readings from Last 1 Encounters:   07/26/21 5' 1.5\" (1.562 m)        Wt Readings from Last 1 Encounters:   07/26/21 234 lb (106.1 kg)       Body mass index is 43.5 kg/m². Estimated Creatinine Clearance: 15 mL/min (A) (based on SCr of 4.8 mg/dL (H)). Goal Trough Level: 15 mcg/mL    Assessment/Plan:  Will initiate Vancomycin with a one time loading dose of 1500 mg x1, followed by pulse dosing. Timing of trough level will be determined based on culture results, renal function, and clinical response. Thank you for the consult. Will continue to follow.   BIANCA Hubbard.Ph.7/26/202110:29 PM

## 2021-07-27 NOTE — PROGRESS NOTES
Called [facility] and spoke to [employer]. Informed this RN that patient last took Xarelto and Plavix at 0900 7/26/21.

## 2021-07-27 NOTE — PROGRESS NOTES
Admit: 2021    Name:  Paige Cowart  Room:  [facility]  MRN:    [id number]    Critical Care Daily Progress Note for 2021   Patient admitted with septic shock, acute encephalopathy and acute on chronic kidney disease    Interval History:     Continues to be drowsy,but arousable and oriented     Scheduled Meds:   mupirocin   Nasal BID    miconazole   Topical BID    pantoprazole  40 mg Intravenous Daily    sodium chloride flush  5-40 mL Intravenous 2 times per day    insulin lispro  0-6 Units Subcutaneous Q4H    cefepime  1,000 mg Intravenous Q24H    vancomycin (VANCOCIN) intermittent dosing (placeholder)   Other RX Placeholder       Continuous Infusions:   sodium chloride      dextrose         PRN Meds:  sodium chloride flush, sodium chloride, ondansetron **OR** ondansetron, polyethylene glycol, acetaminophen **OR** acetaminophen, glucose, dextrose, glucagon (rDNA), dextrose                  Objective:     Temp  Av.7 °F (35.9 °C)  Min: 95.5 °F (35.3 °C)  Max: 98.2 °F (36.8 °C)  Pulse  Av.1  Min: 67  Max: 136  BP  Min: 43/35  Max: 161/129  SpO2  Av %  Min: 65 %  Max: 100 %  Patient Vitals for the past 4 hrs:   BP Temp Temp src Pulse Resp SpO2   21 1000 (!) 92/58   89 22 100 %   21 0930 (!) 103/59   90 25 98 %   21 0900 107/68   90 27 (!) 89 %   21 0830 109/62   90 28 97 %   21 0815 (!) 104/56 98.2 °F (36.8 °C) Bladder 89 27 99 %   21 0800 109/63   89 15 100 %   21 0745 (!) 112/56   89 (!) 35 93 %   21 0730 104/68   88 25 96 %   21 0715 90/60   88 27 99 %   21 0700 118/84   88 25 100 %   21 0645 107/66   88 25    21 0630 105/67   88 29 97 %   21 0615 103/65   109 22 100 %         Intake/Output Summary (Last 24 hours) at 2021 1012  Last data filed at 2021 1000  Gross per 24 hour   Intake 3525.17 ml   Output 695 ml   Net 2830.17 ml       Physical Exam:  General: Drowsy but easily arousable. Mucous Membranes:  Pink , anicteric  Neck: No JVD, no carotid bruit, no thyromegaly  Chest:  Clear to auscultation bilaterally, no added sounds  Cardiovascular:  RRR S1S2 heard, no murmurs or gallops  Abdomen:  Soft, undistended, non tender, no organomegaly, BS present  Extremities: 1+ bilateral pedal edema. Mild redness with the superficial small ulcerations in both legs. Left foot-tips of multiple toes showing dry ulceration of blackish areas. Neurological : Grossly but easily arousable. No focal deficits. Lab Data:  CBC:   Recent Labs     07/26/21  1228 07/27/21 0445   WBC 7.6 9.2   RBC 3.28* 2.99*   HGB 8.9* 8.0*   HCT 28.0* 25.5*   MCV 85.5 85.2   RDW 17.4* 17.7*    330     BMP:   Recent Labs     07/26/21  1817 07/26/21 2226 07/27/21 0445   * 124* 126*   K 6.1* 5.1 4.8   CL 89* 91* 92*   CO2 12* 16* 19*   PHOS 6.7*  --   --    BUN 83* 85* 87*   CREATININE 4.8* 4.9* 4.9*     BNP: No results for input(s): BNP in the last 72 hours. PT/INR:   Recent Labs     07/27/21 0445   PROTIME 44.8*   INR 3.75*     APTT:No results for input(s): APTT in the last 72 hours. CARDIAC ENZYMES:   Recent Labs     07/26/21  1228 07/26/21 1817 07/26/21 2226   TROPONINI 0.05* 0.04* 0.04*     FASTING LIPID PANEL:  Lab Results   Component Value Date    CHOL 80 06/27/2021    HDL 32 06/27/2021    TRIG 51 06/27/2021     LIVER PROFILE:   Recent Labs     07/26/21  1228 07/27/21 0445   * 566*   * 299*   BILITOT 3.3* 3.0*   ALKPHOS 393* 364*         Echocardiogram 8/2020  Limited only f/u for LVEF and mitral regurgitation. Technically difficult examination. The left ventricular systolic function is severely reduced with an ejection   fraction of 25%. There is hypokinesis of the apex, apical lateral, apical septum,   anterioseptum and anterior walls. Compared to previous study from 7-1-2020 no changes noted in left   ventricular function.    Moderate mitral regurgitation.     Assessment & Plan:     Patient Active Problem List    Diagnosis Date Noted    Mitral valve regurgitation 05/24/2018    CAD (coronary artery disease)     Sepsis ([address] 07/26/2021    Generalized weakness 07/02/2021    Heart failure ([address] 06/21/2021    Cellulitis 04/22/2021    Closed nondisplaced fracture of navicular bone of left foot     Acute on chronic systolic CHF (congestive heart failure) ([address] 62/41/5278    Acute systolic CHF (congestive heart failure) ([address] 01/11/2021    CHF (congestive heart failure), NYHA class I, acute on chronic, combined ([facility] 01/11/2021    DKA, type 2, not at goal [facility]) 12/21/2020    Moderate protein-calorie malnutrition ([address] 12/01/2020    Dyspnea on exertion     Ischemic heart disease     Hypokalemia 11/30/2020    CKD (chronic kidney disease) stage 4, GFR 15-29 ml/min ([facility])     Acute kidney injury superimposed on CKD ([address] 08/05/2020    Morbid obesity with BMI of 40.0-44.9, adult ([facility] 08/05/2020    PAD (peripheral artery disease) ([address]     Peripheral venous insufficiency 07/02/2020    Chronic renal impairment     Lower extremity edema 06/22/2020    Habitual self-excoriation 06/22/2020    Ulcer of left lower leg, limited to breakdown of skin ([address] 06/22/2020    Ulcer of right leg, limited to breakdown of skin ([address] 06/22/2020    Arthritis     Cardiomyopathy ([address]     Pulmonary hypertension ([address]     Acute on chronic congestive heart failure ([address] 02/28/2020    Pulmonary nodule 02/28/2020    Class 2 severe obesity due to excess calories with serious comorbidity and body mass index (BMI) of 39.0 to 39.9 in adult [facility]) 02/28/2020    Bipolar disorder ([address] 02/28/2020    Chronic systolic CHF (congestive heart failure) ([address] 02/21/2020    Dyslipidemia     Vitamin D deficiency 10/29/2019    SHAUNNA (acute kidney injury) ([facility] 09/29/2019    COPD (chronic obstructive pulmonary disease) ([facility] 05/17/2019    Hyponatremia 05/23/2018    Iron deficiency anemia 05/23/2018    GERD (gastroesophageal reflux disease)     Anxiety and depression     Type 2 diabetes mellitus with diabetic polyneuropathy, with long-term current use of insulin ([facility] 12/10/2015    CLINT (obstructive sleep apnea) 12/10/2015    Tobacco abuse disorder 12/10/2015    Abel's esophagus 04/29/2013    Essential hypertension 04/13/2011    Viral hepatitis C 03/10/2010     Septic shock, unclear source. Could be cellulitis in both lower extremities. Received 4 L of IV fluids in the emergency room. Placed on Levophed. Now off pressors. Blood urine and sputum cultures. Continue IV cefepime and vancomycin. Follow culture results. Acute metabolic encephalopathy. Secondary to septic shock. Resolving. Elevated LFTs. No abdominal tenderness. Denies alcohol. Likely secondary to severe sepsis and shock. Obtain right upper quad ultrasound. Follow hepatic profile closely. Acute kidney injury on chronic kidney disease stage IV. Likely ATN in the setting of  septic shock. Received 4 L of IV fluids. Was on Levophed but now off. Nephrology consult. Anion gap metabolic acidosis. Secondary to acute kidney injury. Resolving. Chronic systolic congestive heart failure. Last EF 25% 8/2020. Hold torsemide and metoprolol given low blood pressure readings. Not on ACE/ARB given chronic kidney disease. Coronary disease status post CABG, stents. Elevated troponin secondary to increased demand and chronic kidney disease. Hold aspirin per cardiology for now. Lipitor held secondary to elevated LFTs. History of paroxysmal atrial fibrillation. Currently sinus tachycardia. Eliquis held per cardiology. Type 2 diabetes. Controlled. Placed on sliding scale insulin. COPD. Stable. Continue inhalers. Acute on chronic anemia. Anemia secondary to chronic kidney disease. Drop in H&H over the past 2 to 3 weeks. Hemoccult stool.   Hold antiplatelets and Eliquis for now.      Hyponatremia. On tolvaptan 3 days a week. Nephrology following. Hyperkalemia. Start on Porsha Brewster today. DVT prophylaxis. Lizbet Calderon MD

## 2021-07-27 NOTE — CONSULTS
Reason for referral and CC: septic shock    HISTORY OF PRESENT ILLNESS: 57-year-old female with a history of atrial fibrillation cardiomyopathy and COPD presented with altered mental status. She was found to be hypotensive with sepsis of unknown etiology. She has also acute renal failure with uremia. Sent from [facility] for confusion. The patient is drowsy and cannot provide history and had to be oriented to where she is.   Femoral CVC    Past Medical History:   Diagnosis Date    Acute blood loss anemia 8/5/2020    Acute kidney injury ([address] 12/25/2019    Acute kidney injury superimposed on CKD ([address] 8/5/2020    Acute on chronic combined systolic and diastolic congestive heart failure ([address] 1/10/2020    Acute on chronic right-sided heart failure ([address] 08/2020    Alcohol dependence ([address] 3/10/2010    Angina pectoris (HCC)     Arthritis     Asthma     Atrial fibrillation (HCC)     CAD (coronary artery disease)     Cardiomyopathy ([address]     Cellulitis 6/3/2020    Charcot's joint of ankle, left     Chronic kidney disease     COPD (chronic obstructive pulmonary disease) ([address]     Diabetic ulcer of left foot associated with type 2 diabetes mellitus, limited to breakdown of skin ([address] 1/18/2020    Diabetic ulcer of toe of right foot associated with type 2 diabetes mellitus ([address] 1/18/2020    Enthesopathy     GERD (gastroesophageal reflux disease)     Hyperlipidemia     Hypertension     Left renal artery stenosis ([address] 08/2020    MI (myocardial infarction) ([address] 10/07/2019    NSTEMI    Morbid obesity ([address]     Osteoarthritis     Peripheral neuropathy     Peripheral vascular disease ([address]     Polyarthritis     Positive FIT (fecal immunochemical test) 2/28/2020    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 steal syndrome 09/01/2020    Traumatic perinephric hematoma, left, initial encounter 8/4/2020     Past Surgical History:   Procedure Laterality Date    ARTERIAL BYPASS SURGRY Left 01/06/2016 Axillary/Subclavian to Brachial Bypass w/reversed SVG    CARDIAC CATHETERIZATION  03/27/2018    Dr. Ernst Lomita - at [address] [place]  01/20/2020    Dr. Tabitha Iglesias      pancreatitis post surgery    CORONARY ANGIOPLASTY WITH STENT PLACEMENT  03/16/2018    MELODY- 3.5 x 38 and 3.5 x 20 to Cx    CORONARY ANGIOPLASTY WITH STENT PLACEMENT  01/03/2018    MELODY- 3.0 x 38 and 2.75 x 26 and 2.75 x 8 and 2.75 x 22 to the LAD    CORONARY ANGIOPLASTY WITH STENT PLACEMENT  10/07/2019    MELODY- 2.5 x 8 to [address] GRAFT N/A 1/27/2020    CORONARY ARTERY BYPASS GRAFTING X 1, ON PUMP BEATING HEART, INTERNAL MAMMARY ARTERY TO LEFT ANTERIOR DESCENDING ARTERY, VAS CATH INSERTION, URI, PLATELET GEL APPLICATION, 5 LEVEL BILATERAL INTERCOSTAL NERVE BLOCK, STERNAL PLATING performed by Farzad Montenegro MD at [address] Right 5/16/2019    fem-pop, performed by Cale Orr MD at [address]  02/14/2019    CardioMEMs insertion for CHF    IR NONTUNNELED VASCULAR CATHETER  7/9/2021    IR NONTUNNELED VASCULAR CATHETER 7/9/2021 [facility]ED[facility]    ROTATOR CUFF REPAIR Left 04/22/2016    TONSILLECTOMY      TRANSESOPHAGEAL ECHOCARDIOGRAM  01/26/2020    TRANSLUMINAL ANGIOPLASTY Left 10/08/2019    with stenting, at [facility]    [address] Left 04/29/2020    of the [facility] re-occlusion    TUMOR EXCISION      benign tumors X 2 chest & axilla    UPPER GASTROINTESTINAL ENDOSCOPY  4/25/2013    BX barretts, HH, gastritis    UPPER GASTROINTESTINAL ENDOSCOPY  12/10/2015    UPPER GASTROINTESTINAL ENDOSCOPY  01/06/2017    Gastritis    VASCULAR SURGERY Left 12/08/2015    upper extremity and mesenteric angiogram (diagnostic only)    VASCULAR SURGERY Bilateral 07/07/2020    diagnostic lower extremity angiogram only    VASCULAR SURGERY Left 08/04/2020    angioplasty and stent of renal artery    VASCULAR SURGERY Left 08/05/2020    coil embolization of branch renal artery vessel for bleeding    VASCULAR SURGERY Left 09/01/2020    angioplasty and stenting of subclavian artery     Family History  family history includes Cancer in her maternal aunt and mother; Heart Disease in her maternal grandmother. Social History:  reports that she has been smoking cigarettes. She has a 30.00 pack-year smoking history. She has never used smokeless tobacco.   reports no history of alcohol use. ALLERGIES:  Patient is allergic to lisinopril. Continuous Infusions:   norepinephrine Stopped (07/27/21 0542)    sodium chloride      dextrose      vasopressin (Septic Shock) infusion Stopped (07/26/21 2109)     Scheduled Meds:   miconazole   Topical BID    pantoprazole  40 mg Intravenous Daily    sodium chloride flush  5-40 mL Intravenous 2 times per day    insulin lispro  0-6 Units Subcutaneous Q4H    cefepime  1,000 mg Intravenous Q24H    vancomycin (VANCOCIN) intermittent dosing (placeholder)   Other RX Placeholder     PRN Meds:  sodium chloride flush, sodium chloride, ondansetron **OR** ondansetron, polyethylene glycol, acetaminophen **OR** acetaminophen, glucose, dextrose, glucagon (rDNA), dextrose    REVIEW OF SYSTEMS:  Pt unable to answer    PHYSICAL EXAM: BP 90/60   Pulse 88   Temp 97.9 °F (36.6 °C) (Bladder)   Resp 27   Ht 5' 1.5\" (1.562 m)   Wt 234 lb (106.1 kg)   LMP 10/24/2012   SpO2 99%   BMI 43.50 kg/m²  on RA  Constitutional:  No acute distress. Eyes: PERRL. Conjunctivae anicteric. ENT: Normal nose. Normal tongue. Neck:  Trachea is midline. No thyroid tenderness. Respiratory: No accessory muscle usage. decreased breath sounds. No wheezes. No rales. No Rhonchi. Cardiovascular: Normal S1S2. No digit clubbing. No digit cyanosis. No LE edema. Gastrointestinal: No mass palpated. No tenderness palpated. No umbilical hernia. Lymphatic: No cervical or supraclavicular adenopathy.    Skin: Bilateral tibial erythema and areas of excoriation and scabbing. Psychiatric: No anxiety or Agitation.  Drowsy and only oriented to self    CBC:   Recent Labs     07/26/21  1228 07/27/21  0445   WBC 7.6 9.2   HGB 8.9* 8.0*   HCT 28.0* 25.5*   MCV 85.5 85.2    330     BMP:   Recent Labs     07/26/21  1817 07/26/21  2226 07/27/21  0445   * 124* 126*   K 6.1* 5.1 4.8   CL 89* 91* 92*   CO2 12* 16* 19*   PHOS 6.7*  --   --    BUN 83* 85* 87*   CREATININE 4.8* 4.9* 4.9*        Recent Labs     07/26/21  1228 07/27/21  0445   * 566*   * 299*   BILITOT 3.3* 3.0*   ALKPHOS 393* 364*     Recent Labs     07/27/21  0445   PROTIME 44.8*   INR 3.75*     Recent Labs     07/26/21  1228   COLORU DARK YELLOW*   PHUR 5.0  5.0   WBCUA 3-5   RBCUA 11-20*   MUCUS Rare*   BACTERIA 1+*   CLARITYU SL CLOUDY*   SPECGRAV 1.025   LEUKOCYTESUR SMALL*   UROBILINOGEN 1.0   BILIRUBINUR SMALL*   BLOODU TRACE-INTACT*   GLUCOSEU Negative     Recent Labs     07/26/21  2230   PHART 7.402   FKM1YHG 25.7*   PO2ART 113.2*     Blood cx NGTD  Chest imaging was reviewed by me and showed clear lungs    ASSESSMENT:  · Septic shock - source unclear; consider cellulitis or cholecystitis    · Acute metabolic encephalopathy likely due to sepsis and uremia  · LE cellulitis  · Jaundice and elevated LFTs  · SHAUNNA  · CHF, EF 25%  · HCV  · DM2  · CLINT not on CPAP  · COPD    PLAN:  · Received 4L IVF  · Renal consulted for possible HD  · Broad spectrum antibiotics  · monitor LFTs  · Weaned off of levophed  · RUQ US and GI consult  · Pan culture  · Home inhaler regimen  · Vit K x 1  · Hold plavix and Eliquis for elevated INR and bleeding from CVC site  · Remain in ICU today  · May need pressors with HD  · CCT 31 min    Thank you Jodee Schwab* for this consult

## 2021-07-27 NOTE — CARE COORDINATION
Readmission Assessment  Number of Days since last admission?: 8-30 days  Previous Disposition: SNF (Pt went to the [address] [place] [address] for SNF)  Who is being Interviewed: Caregiver (Pt's daughter Jolly Ramirez")  What was the patient's/caregiver's perception as to why they think they needed to return back to the hospital?: Other (Comment) (Pt's daughter stated pt was out of it)  Did you visit your Primary Care Physician after you left the hospital, before you returned this time?: No  Why weren't you able to visit your PCP?: Other (Comment) (Pt was at the [facility].  Pt's david stated pt saw the MD at the [facility])  Did you see a specialist, such as Cardiac, Pulmonary, Orthopedic Physician, etc. after you left the hospital?: No  Who advised the patient to return to the hospital?: Skilled Unit ([facility] sent pt to the hospital per pt's daughters report)  Does the patient report anything that got in the way of taking their medications?: No  In our efforts to provide the best possible care to you and others like you, can you think of anything that we could have done to help you after you left the hospital the first time, so that you might not have needed to return so soon?: Other (Comment) (Pt's daughter stated nothing additional when writer inquired)

## 2021-07-27 NOTE — CARE COORDINATION
Case Management Assessment  Initial Evaluation      Patient Name: Akin Post  YOB: 1965  Diagnosis: Sepsis ([address] [A41.9]  Date / Time: 7/26/2021 12:21 PM    Admission status/Date: 07/26/2021 Inpatient  Chart Reviewed: Yes      Patient Interviewed: Yes   Family Interviewed:  Yes - pt's daughter Senait Anderson" Milad Bailon at [phone]      Hospitalization in the last 30 days:  Yes from 07/03/2021-07/14/2021 with inability to walk. Pt discharged to the [facility] on 07/14/2021      Health Care Decision Maker :   Primary Decision Maker: Gurwinder Austin \"Lanny\" - Child - [phone]    Secondary Decision Maker: Anjana Ayala - Brother/Sister - [phone]    (CM - must 1st enter selection under Navigator - emergency contact- [address] Relationship and pick relationship)   Who do you trust or have selected to make healthcare decisions for you      Met with: pt and pt's daughter Senait Anderson" via phone call   Pocnho Almanza conducted  (bedside/phone): bedside with pt and phone call to daughter    Current PCP: [address] required for SNF : Y          3 night stay required - N    ADLS  Support Systems/Care Needs:    Transportation: EMS transportation  currently as pt is at the [employer] Preparation: staff at the [employer] of [address]: Pt has been at the [facility] for about 2 weeks.  Prior to SNF, pt lives at home with friends  Steps: none at the [address] [place] [address] Drive for return to present living arrangements: Yes to the [employer] per pt's daughter  Identified Issues: pt will require a new pre-certification and be agreeable to SNF    [address] with Home Health Care : No Agency:(Services)     Passport/Waiver : No  :                      Phone Number:    Passport/Waiver Services: [address]   DME Provider: n/a  Equipment: Walker___Cane___RTS___ BSC___Shower Chair___Hospital Bed___W/C____Other________  02 at ____Liter(s)---wears(frequency)_______ [facility] ___ CPAP___ BiPap___   N/A____      Home O2 Use :  No    If No for home O2---if presently on O2 during hospitalization:  No  if yes CM to follow for potential DC O2 need  Informed of need for care provider to bring portable home O2 tank on day of discharge for nursing to connect prior to leaving:   Not Indicated  Verbalized agreement/Understanding:   Not Indicated    Community Service Affiliation  Dialysis:  No    · Agency:  · Location:  · Dialysis Schedule:  · Phone:   · Fax: Other Community Services: n/a    DISCHARGE PLAN: Explained Case Management role/services. Chart review completed. Met with pt briefly at bedside. Pt thought she was at home in [facility]; explained where pt was and she stated okay. Pt stated writer could call her daughter Adrienne Minor". Called and spoke with pt's daughter who completed the assessment. She stated that pt has been at the [facility] for PT/OT and she would like for pt to return. She is aware that we will need PT/OT, insurance authorization and pt would need to be agreeable if her mentation improves. She stated agreement. She denied needing or wanting a list of transportation companies to review. Russell Sorensen \"Lanny\" did express concerns for pt's mental status and she was aware pt thought she was in [facility]. Adrienne Minor" stated that they are in the process of doing POA as she is the next of kin and has a call out to her uncle that is an  as the SNF told her she needed an  for POA. Explained the hospital could assist with healthcare POA but pt has to be of sound mind/body in order to complete them which she stated agreement. She denied further needs from CM and was transferred her to pt's RN for an update.      Ashley Enriquez at the [facility] stated pt was skilled and will be able to take back pending precert and no major changes that they can't meet pt's needs. April Addison stated they will follow pt. PT/OT will be needed when appropriate. CM will follow. Please notify CM if needs or concerns arise. Addendum at 2:50pm: Per note from Dr. Renate Bo on this date, the plan is to start dialysis-see his note. If pt needs outpatient HD, this will need to be arranged. No

## 2021-07-27 NOTE — CONSULTS
[address]                 [address] [address]                                  CONSULTATION    PATIENT NAME: Bertha James                    :        1965  MED REC NO:   [id number]                          ROOM:       3009  ACCOUNT NO:   [de-identified]                           ADMIT DATE: 2021  PROVIDER:     Tamera Ordonez MD    CONSULT DATE:  2021    REASON FOR CONSULTATION:  Elevated troponin, hypotension, coronary  artery disease, and cardiomyopathy. HISTORY OF PRESENT ILLNESS:  A 47-year-old female who presented to the  hospital with mental status changes, subsequently diagnosed with sepsis. The patient because of her mental status changes was unable to give much  history when she initially presented. At this time, she is a little  more alert and oriented. She denies any chest pain. She has chronic  shortness of breath which is at baseline, unchanged, it is present for  month, it waxes and wanes in severity, usually gets worse when she is  volume overloaded which often occurs because of her noncompliance. She  has no associated chest discomfort. It is present for years and overall  without significant change. PAST MEDICAL HISTORY:  1. Coronary artery disease status post angioplasty with stents and  coronary artery bypass surgery in the past.  2.  Paroxysmal atrial fibrillation and flutter. 3.  Chronic anticoagulation. 4.  Ischemic cardiomyopathy, ejection fraction of 25%. 5.  Status post CardioMEMS. 6.  Chronic systolic congestive heart failure. 7.  COPD. 8.  Diabetes. 9.  Peripheral arterial disease. 10.  Obesity. 11.  Chronic edema. 12.  Sleep apnea. 13.  Noncompliance. 14.  Chronic kidney disease. SOCIAL HISTORY:  She smokes. Lives in the extended care facility. FAMILY HISTORY:  Positive for heart disease, nonspecific. REVIEW OF SYSTEMS:  She denies fevers, chills, or cough.   No focal  neurologic symptoms. No headache. No visual changes. No recent GI or   bleeding. She has generalized weakness. All other systems are  negative except as present illness. ALLERGIES:  LISINOPRIL. MEDICATIONS:  See list in the chart, which I have reviewed. PHYSICAL EXAMINATION:  VITAL SIGNS:  Blood pressure is 107/68, heart rate 90, respirations 27,  temperature 98.2. She is 99% saturating on room air. GENERAL:  Obese, white female, in no acute distress. HEENT:  Normocephalic and atraumatic. Oropharynx clear. Moist mucous  membranes. NECK:  Supple. CHEST:  Coarse breath sounds throughout. CARDIAC:  Regular S1 and S2. There is no S3 or S4 gallop. There is a  soft systolic murmur. Jugular venous pressure is difficult to assess  due to body habitus. ABDOMEN:  Soft and nontender. Diminished bowel sounds. EXTREMITIES:  Positive edema. NEUROLOGIC:  Grossly nonfocal, although generalized weakness. PSYCHIATRIC:  Affect blunted. SKIN:  Warm and dry, multiple excoriations throughout. DIAGNOSTIC DATA:  Significant laboratory data includes a creatinine of  4.9. Troponin of 0.04. Chest x-ray is negative for pulmonary edema. I  personally reviewed. Telemetry, normal sinus rhythm. EKG; wide complex  tachycardia. IMPRESSION:  1. Elevated troponin due to septic shock, acute kidney injury. I do  not believe this represents an acute coronary syndrome. 2.  Sepsis. 3.  Shock. 4.  Encephalopathy, improved. 5.  Acute-on-chronic kidney disease. 6.  Coronary artery disease, status post angioplasty with stents and  coronary artery bypass surgery, currently stable. 7.  Paroxysmal atrial fibrillation and flutter. 8.  Chronic anticoagulation. 9.  Ischemic cardiomyopathy, ejection fraction of 25%. 10.  Status post CardioMEMS. 11.  Chronic diastolic congestive heart failure, currently compensated. 12.  Chronic edema at baseline. 13.  Anemia, progressive, new. 14.  COPD.   15. Diabetes. 16.  Peripheral arterial disease including left subclavian stenosis. 17.  Morbid obesity. 18.  Sleep apnea. 19.  Noncompliance. RECOMMENDATIONS:  1.  Maintain blood pressure with continuous IV Levophed and vasopressin. Adjust as necessary. 2.  IV fluids. Hold diuretics. 3.  Hold Eliquis and Plavix until anemia is further evaluated. 4.  Hold statin due to elevated liver enzymes. 5.  Hold blood pressure medicines due to hypotension. A 40 minutes of critical care time spent in this patient who is  critically ill requiring continuous IV medications requiring continuous adjustment and monitoring due to her life threatening condition.         Chula Hardin MD    D: 07/27/2021 9:50:10       T: 07/27/2021 11:57:05     [id number]  Job#: [id number]     Doc#: [id number]    CC:

## 2021-07-27 NOTE — H&P
[address]                 [address] [place] [address]                              HISTORY AND PHYSICAL    PATIENT NAME: Ranjith Figueroa                    :        1965  MED REC NO:   [id number]                          ROOM:       3009  ACCOUNT NO:   [de-identified]                           ADMIT DATE: 2021  PROVIDER:     Jack Wasserman MD    I obtained the history and performed physical exam on the patient in the  intensive care unit on 2021. CHIEF COMPLAINT:  Altered mentation. HISTORY OF PRESENT ILLNESS:  The patient is a 68-year-old   female who is a poor historian right now, in and out of alertness,  presenting to hospital with what appears to be altered mentation since  the last couple of days, progressive at the nursing facility, who was  apparently picking at her skin wounds, was found to be hypotensive and  confused and hence unable to provide a very good history without nausea,  vomiting, fevers, or chills. Per the documentation in the EMR, the  patient was just staring at the ceiling and hence much history could not  be obtained by the EMS. PAST MEDICAL HISTORY:  1. Chronic kidney disease. 2.  Chronic combined systolic and diastolic congestive heart failure. 3.  Atrial fibrillation. 4.  Coronary artery disease with cardiomyopathy. 5.  Morbid obesity with a BMI of 43.5 kg/m2. 6.  Type 2 diabetes. 7.  Chronic bilateral lower extremity wounds. PAST SURGICAL HISTORY:  1. Severe peripheral vascular disease with axillary subclavian to  brachial bypass. 2.  PCI with stenting. 3.  Coronary artery bypass grafting. FAMILY HISTORY:  Reviewed by me and is currently noncontributory. SOCIAL HISTORY:  Has a remote history of alcoholism. Currently is a  nursing home resident. MEDICATIONS:  The patient's nursing home medication list is reviewed and  documented by me.     ALLERGIC HISTORY: LISINOPRIL. REVIEW OF SYSTEMS:  Significant for encephalopathy and per the history  of present illness. All other systems have been reviewed and are  negative except for the history of present illness. PHYSICAL EXAMINATION:  The patient examined by me in the intensive care  unit. VITAL SIGNS:  Temperature 96.9, respiratory rate is 22, pulse 75, blood  pressure initially was 70/40 and improved to 116/59 with pressor  support. CNS:  The patient is awake and appears to be more alert than her  presentation. PSYCH:  The patient is cooperative, is not hallucinating. HEENT:  Eyes:  Pupils are reactive to light. ENT:  Extraocular muscle  movements are intact. RESPIRATORY SYSTEM:  Clear. CVS:  S1 and S2 are heard. No murmurs or rubs. ABDOMEN:  Nondistended. MUSCULOSKELETAL:  Bilateral lower extremity dressings are noted due to  the chronic wounds. SKIN:  The patient has got bilateral lower extremity 1+ edema. DIAGNOSTIC DATA:  BUN 93, creatinine 5.4. BNP 7507. Alcohol level not  detected. Lactic acid 4.6. EKG shows a ventricular rate of 82 beats  per minute, was tensed initially. Troponin 0.04. Glucose 255, sodium 121, and potassium 6.1. CONSULTATIONS REQUESTED:  Nephrology and Critical Care Medicine. REVIEW OF PREVIOUS MEDICAL RECORDS:  1. Showed an echo from 04/26/2021 that showed an LVEF of 25% with  ventricular pressure and volume overload. 2.  Grade 2 diastolic dysfunction noted. ASSESSMENT:  1. Acute encephalopathy secondary to probable occult sepsis with septic  shock. 2.  Acute on chronic kidney disease, stage III. 3.  Coronary artery disease with chronic combined systolic and diastolic  congestive heart failure. 4.  Morbid obesity with a BMI of 43.5 kg/m2. PLAN OF CARE:  The patient is currently critically ill and admitted to  the intensive care unit. Broad spectrum IV antibiotics initiated and  will be continued.   Nephrology consult requested for the acute on  chronic kidney disease and management of the hyperkalemia. Pressor  support will be continued with Levophed and vasopressin. Further  management will be per specialist's recommendations. CODE STATUS:  Full. EXPECTED LENGTH OF STAY:  More than two midnights based on the plan of  care above. TOTAL CRITICAL CARE TIME:  35 minutes. RISK:  Very high due to the patient's presentation with the septic shock  and acute encephalopathy. DISPOSITION:  Admitted to intensive care unit.         Renae Gross MD    D: 07/27/2021 6:37:08       T: 07/27/2021 8:37:56     [id number]  Job#: [id number]     Doc#: [id number]    CC:

## 2021-07-28 NOTE — PROGRESS NOTES
Inpatient Occupational Therapy  Evaluation and Treatment    Unit: ICU  Date:  7/28/2021  Patient Name:    Olivier Bautista  Admitting diagnosis:  Sepsis [facility]) [A41.9]  Admit Date:  7/26/2021  Precautions/Restrictions/WB Status/ Lines/ Wounds/ Oxygen: fall risk, IV, bed/chair alarm, roche catheter , ICU monitoring and telesitter    Treatment Time:  0357-8096  Treatment Number: 1     Billable Treatment Time: 29 minutes   Total Treatment Time:   39   minutes    Patient Goals for Therapy:  \" get up to chair \"      Discharge Recommendations: SNF  DME needs for discharge: defer to facility       Therapy recommendations for staff:   Assist of 2 with use of rolling walker (RW) for all ambulation within room    History of Present Illness: Per H&P Hilaria Kwan 927/21  47-year-old   female who is a poor historian right now, in and out of alertness,  presenting to hospital with what appears to be altered mentation since  the last couple of days, progressive at the nursing facility, who was  apparently picking at her skin wounds, was found to be hypotensive and  confused and hence unable to provide a very good history without nausea,  vomiting, fevers, or chills. Per the documentation in the EMR, the  patient was just staring at the ceiling and hence much history could not  be obtained by the EMS.    PAST MEDICAL HISTORY:  1. Chronic kidney disease. 2.  Chronic combined systolic and diastolic congestive heart failure. 3.  Atrial fibrillation. 4.  Coronary artery disease with cardiomyopathy. 5.  Morbid obesity with a BMI of 43.5 kg/m2. 6.  Type 2 diabetes. 7.  Chronic bilateral lower extremity wounds.     Home Health S4 Level Recommendation:  NA  AM-PAC Score: AM-PAC Inpatient Daily Activity Raw Score: 13    Preadmission Environment    Pt.  Lives with friend (able to provide 24/7 assist if needed)  Home environment:  mobile home/trailer  Steps to enter first floor:   5 steps to enter  With bilateral HRs  Steps to second floor: N/A  Bathroom:  Tub/Shower unit and Tub Transfer Bench, standard height toilet, Pt uses BSC in living room  Equipment owned:  [facility], Standard Walker (SW) and Rollator  BSC, recliner chair,   Pt sleeps on couch at home    Preadmission Status / PLOF:  History of falls   Yes, patient reports falling in tub at home, and recovered without injury  Pt. Able to drive   Yes  Pt Fully independent with ADL's  Yes  Pt. Required assistance from family for:  Cleaning, Cooking and Laundry   Pts friend performs  Pt. Fully independent for transfers and gait and walked with: No Device   Retired from concrete work    Pain  Yes  Ratin  Location:chronic back pain  Pain Medicine Status: No request made      Cognition    A&O Person, Place and Time   Able to follow 2 step commands    Subjective  Patient lying supine in bed with no family present  Pt agreeable to this OT eval & tx. Upper Extremity ROM:    Impaired R shld flexion 0-90, elbow to distal WFLs, pt reports she needs RCR  Left UE WFLs    Upper Extremity Strength:    BUE strength impaired but not formally assessed w/ MMT    Upper Extremity Sensation    WFL    Upper Extremity Proprioception:  WFL    Coordination and Tone  WFL    Balance  Functional Sitting Balance:  Impaired SBA  Functional Standing Balance:Impaired Min A x2 with RW    Bed mobility:    Supine to sit: Max A of 2  Sit to supine:   Not Tested  Rolling: Max A of 2  Scooting in sitting:   Mod A of 2  Scooting to head of bed:   Not Tested    Bridging:   Not Tested    Transfers:    Sit to stand:  Min A of 2  Stand to sit:  Min A of 2  Bed to chair:   Min A of 2 with RW  Standard toilet: Not Tested  Bed to [facility]:  Not Tested    Dressing:      UE:   Not Tested  LE:    Max A    Bathing:    UE:  Not Tested  LE:  Not Tested    Eating:   Not Tested    Toileting:  Not Tested    Activity Tolerance   Pt completed therapy session with No adverse symptoms noted w/activity   Supine in bed   SpO2: 100% RA  HR: 104  BP: 115/77  Sitting in chair   BP: 105/63  HR: 105  SPO2: 100% RA    Positioning Needs:   Up in chair, call light and needs in reach. Alarm Set    Exercise / Activities Initiated:   N/A    Patient/Family Education:   Role of OT  Recommendations for DC  Safe RW use/hand placement    Assessment of Deficits: Pt seen for Occupational therapy evaluation in acute care setting. Pt demonstrated decreased Activity tolerance, ADLs, Balance , Bed mobility, Safety Awareness, Strength and Transfers. Pt functioning below baseline and will likely benefit from skilled occupational therapy services to maximize safety and independence. Goal(s) : To be met in 3 Visits:  1). Bed to toilet/BSC: Min A    To be met in 5 Visits:  1). Supine to/from Sit:  Mod A  2). Upper Body Bathing:   SBA  3). Lower Body Bathing: Mod A  4). Upper Body Dressing:  SBA  5). Lower Body Dressing: Mod A  6). Pt to demonstrate UE exs x 15 reps with minimal cues    Rehabilitation Potential:  Good for goals listed above. Strengths for achieving goals include: Pt motivated and PLOF  Barriers to achieving goals include:  Weakness     Plan: To be seen 3-5 x/wk while in acute care setting for therapeutic exercises, bed mobility, transfers, dressing, bathing, family/patient education, ADL/IADL retraining, energy conservation training.      Delores Vigil OTR/L [id number]      If patient discharges from this facility prior to next visit, this note will serve as the Discharge Summary

## 2021-07-28 NOTE — PROGRESS NOTES
07:30 Consult called to [address] [place] [address], answering services reported would hand off info to office when open   08:30 Pt awake a&o x4, VSS assessment as charted . Bed alarm on at all times for safety   09:00 Critical care rounds completed @ bedside w/ Dr. Sahara Blandon   12:30 Pt remains awake  a&ox4, Reassessment unchanged and as charted   17:30 Pt downgraded to PCU status , per MD Chevy Plasencia) pt can go down for MRCP w/out a nurse and w/out a monitor  19:00 Pt remains awake a&o x4, Pt being prepared for transfer to PCU. Report called to PCU nurse Tere.  Pt awake a&o x4, pt yessenia @ handoff

## 2021-07-28 NOTE — CARE COORDINATION
INTERDISCIPLINARY PLAN OF CARE CONFERENCE    Date/Time: 7/28/2021 9:31 AM  Completed by: Amanda Brown RN, Case Management      Patient Name:  Clint Abraham  YOB: 1965  Admitting Diagnosis: Sepsis ([address] [A41.9]     Admit Date/Time:  7/26/2021 12:21 PM    Chart reviewed. Interdisciplinary team contacted or reviewed plan related to patient progress and discharge plans. Disciplines included Case Management, Nursing, and Dietitian. Current Status:inpt  PT/OT recommendation for discharge plan of care: tbd    Expected D/C Disposition:  tbd    Discharge Plan Comments: Reviewed chart. Pt from [facility],+bed available per [facility], will require pre-cert and rapid E-86 to return. On HD in-pt, will need to follow for possible out-pt HD slot.      Home O2 in place on admit: No

## 2021-07-28 NOTE — PROGRESS NOTES
Inpatient Physical Therapy Evaluation and Treatment    Unit: ICU  Date:  7/28/2021  Patient Name:    Kanwal Hernández  Admitting diagnosis:  Sepsis [facility]) [A41.9]  Admit Date:  7/26/2021  Precautions/Restrictions/WB Status/ Lines/ Wounds/ Oxygen: Fall risk, Bed/chair alarm, Lines -IV and Lees catheter, [place] (hard of hearing), Telemetry and Continuous pulse oximetry, telesitter    Treatment Time:  1323 - 1402  Treatment Number:  1   Timed Code Treatment Minutes: 29 minutes  Total Treatment Minutes:  39  minutes    Patient Goals for Therapy: \" Get up to chair. \"          Discharge Recommendations: SNF  DME needs for discharge: defer to facility       Therapy recommendation for EMS Transport: requires transport by cot due to need of two person assist for bed to chair transfers    Therapy recommendations for staff:   Assist of 2 (Min A x 2) with use of rolling walker (RW) and gait belt for all transfers and ambulation to/from [facility]  to/from chair    History of Present Illness: Per H&P Frank Moon 927/21 47-year-old  female who is a poor historian right now, in and out of alertness, presenting to hospital with what appears to be altered mentation since the last couple of days, progressive at the nursing facility, who was apparently picking at her skin wounds, was found to be hypotensive and confused and hence unable to provide a very good history without nausea, vomiting, fevers, or chills.  Per the documentation in the EMR, the patient was just staring at the ceiling and hence much history could not be obtained by the EMS. PAST MEDICAL HISTORY:  1.  Chronic kidney disease. 2.  Chronic combined systolic and diastolic congestive heart failure. 3.  Atrial fibrillation. 4.  Coronary artery disease with cardiomyopathy. 5.  Morbid obesity with a BMI of 43.5 kg/m2. 6.  Type 2 diabetes. 7.  Chronic bilateral lower extremity wounds.     Home Health S4 Level Recommendation:  NA  AM-PAC Mobility Score    AM-PAC caregiver ability, home environment not conducive to patient recovery and limited safety awareness. Goals : To be met in 3 visits:  1). Independent with LE Ex x 10 reps    To be met in 6 visits:  1). Supine to/from sit: Mod A   2). Sit to/from stand: Mod A   3). Bed to chair: Mod A   4). Gait: Ambulate 50 ft.  with  Mod A  and use of LRAD (least restrictive assistive device)  5). Tolerate B LE exercises 3 sets of 10-15 reps  6). Ascend/descend 5 steps with Min A  with use of hand rail bilateral and LRAD (least restrictive assistive device)    Rehabilitation Potential: Good  Strengths for achieving goals include:   PLOF and Pt cooperative   Barriers to achieving goals include:    Weakness and anxiety with ambulation    Plan    To be seen 3-5 x / week  while in acute care setting for therapeutic exercises, bed mobility, transfers, progressive gait training, balance training, and family/patient education. Signature: Cranston Halsted, MS PT, # [id number]    If patient discharges from this facility prior to next visit, this note will serve as the Discharge Summary.

## 2021-07-28 NOTE — PROGRESS NOTES
Vanc-Day 3  SrCr 4.6, Pt to start HD but no line   Will place order for Vanc 1gm today, random in am. Follow for HD days  Dionicio DUARTE 7/28/202112:26 PM  .

## 2021-07-28 NOTE — CONSULTS
Gastroenterology Consult Note    Patient:   Alexandre Joshi   :    1965   Facility:   [facility]  Referring/PCP: Deena Gonzales  Date:     2021  Consultant:   Jim Hammond PA-C      Chief Complaint   Patient presents with    Altered Mental Status     Pt with AMS per ECF. LKW yesterday afternoon        History of Present illness     60year old female with a history of atrial fibrillation, CAD, CKD, COPD, diabetes, GERD, HLD, and HTN presented to the ED with AMS. GI was consulted for evaluation of elevated bilirubin and LFTs, sepsis, dilated bile duct. She admits to itching and scleral icterus. She denies rash, nausea, vomiting, heartburn, dysphagia, cramping, bloating, abdominal pain, change in bowel habits, rectal bleeding, melena, and early satiety. She smokes one pack of cigarettes daily. She takes ibuprofen every other day for myalgias. She denies alcohol, marijuana, and illicit substance use. Her last colonoscopy in 3/2019 showed benign precancerous polyp. Her last EGD in 2017 showed mild gastritis.        Past Medical History:   Diagnosis Date    Acute blood loss anemia 2020    Acute kidney injury ([address] 2019    Acute kidney injury superimposed on CKD ([address] 2020    Acute on chronic combined systolic and diastolic congestive heart failure ([address] 1/10/2020    Acute on chronic right-sided heart failure ([address] 2020    Alcohol dependence ([address] 3/10/2010    Angina pectoris (HCC)     Arthritis     Asthma     Atrial fibrillation (HCC)     CAD (coronary artery disease)     Cardiomyopathy ([address]     Cellulitis 6/3/2020    Charcot's joint of ankle, left     Chronic kidney disease     COPD (chronic obstructive pulmonary disease) ([address]     Diabetic ulcer of left foot associated with type 2 diabetes mellitus, limited to breakdown of skin ([address] 2020    Diabetic ulcer of toe of right foot associated with type 2 diabetes mellitus ([address] 2020 10/08/2019    with stenting, at [facility]   [address] Left 04/29/2020    of the [facility] re-occlusion    TUMOR EXCISION      benign tumors X 2 chest & axilla    UPPER GASTROINTESTINAL ENDOSCOPY  4/25/2013    BX barretts, HH, gastritis    UPPER GASTROINTESTINAL ENDOSCOPY  12/10/2015    UPPER GASTROINTESTINAL ENDOSCOPY  01/06/2017    Gastritis    VASCULAR SURGERY Left 12/08/2015    upper extremity and mesenteric angiogram (diagnostic only)    VASCULAR SURGERY Bilateral 07/07/2020    diagnostic lower extremity angiogram only    VASCULAR SURGERY Left 08/04/2020    angioplasty and stent of renal artery    VASCULAR SURGERY Left 08/05/2020    coil embolization of branch renal artery vessel for bleeding    VASCULAR SURGERY Left 09/01/2020    angioplasty and stenting of subclavian artery       Social:   Social History     Tobacco Use    Smoking status: Current Every Day Smoker     Packs/day: 1.00     Years: 30.00     Pack years: 30.00     Types: Cigarettes    Smokeless tobacco: Never Used   Substance Use Topics    Alcohol use: No     Comment: quit 2006      Family:   Family History   Problem Relation Age of Onset    Heart Disease Maternal Grandmother     Cancer Mother         lung and brain cancer    Cancer Maternal Aunt         lung and brain cancer     No current facility-administered medications on file prior to encounter.      Current Outpatient Medications on File Prior to Encounter   Medication Sig Dispense Refill    acetaminophen (TYLENOL) 650 MG extended release tablet Take 650 mg by mouth every 4 hours as needed for Pain      Umeclidinium Bromide (INCRUSE ELLIPTA) 62.5 MCG/INH AEPB Inhale 1 Dose into the lungs daily      fluticasone-salmeterol (ADVAIR) 250-50 MCG/DOSE AEPB Inhale 1 puff into the lungs 2 times daily      apixaban (ELIQUIS) 5 MG TABS tablet Take 1 tablet by mouth 2 times daily 60 tablet     metoprolol tartrate (LOPRESSOR) 25 MG tablet Take 1 tablet by mouth 2 times daily 60 tablet 3    miconazole (MICOTIN) 2 % powder Apply topically 2 times daily. 45 g 1    torsemide (DEMADEX) 10 MG tablet Take 5 tablets by mouth daily 30 tablet 3    potassium chloride (KLOR-CON M) 20 MEQ extended release tablet Take 2 tablets by mouth daily 60 tablet 3    Insulin Degludec (TRESIBA FLEXTOUCH) 100 UNIT/ML SOPN Inject 3 Units into the skin nightly 10 pen 5    Cholecalciferol (VITAMIN D) 25 MCG TABS Take 1 tablet by mouth daily 60 tablet 1    QUEtiapine (SEROQUEL) 25 MG tablet Take 1 tablet by mouth nightly 30 tablet 0    Blood Glucose Monitoring Suppl (ONE TOUCH ULTRA 2) w/Device KIT USE TO MONITOR BLOOD GLUCOSE LEVELS 1 kit 0    ONE TOUCH ULTRASOFT LANCETS MISC USE TO TEST BLOOD GLUCOSE LEVELS 4 TIMES DAILY 150 each 3    blood glucose test strips (ASCENSIA AUTODISC VI;ONE TOUCH ULTRA TEST VI) strip USE TO MONITOR BLOOD GLUCOSE LEVELS 4 TIMES DAILY 150 each 3    nitroGLYCERIN (NITROSTAT) 0.4 MG SL tablet Place 1 tablet under the tongue every 5 minutes as needed for Chest pain up to max of 3 total doses.  If no relief after 1 dose, call 911. 25 tablet 0    Insulin Pen Needle (B-D ULTRAFINE III SHORT PEN) 31G X 8 MM MISC Five shots a day 200 each 5    atorvastatin (LIPITOR) 80 MG tablet TAKE 1 TABLET BY MOUTH EVERY DAY AT NIGHT 90 tablet 3    blood glucose test strips (FREESTYLE LITE) strip USE TO CHECK BLOOD GLUCOSE LEVELS FOUR TIMES DAILY 200 strip 10    benztropine (COGENTIN) 1 MG tablet Take 1 mg by mouth nightly       INSULIN SYRINGE .5CC/29G 29G X 1/2\" 0.5 ML MISC 1 each by Does not apply route daily 100 each 3    pantoprazole (PROTONIX) 40 MG tablet Take 1 tablet by mouth 2 times daily (Patient taking differently: Take 20 mg by mouth 2 times daily ) 60 tablet 0    clopidogrel (PLAVIX) 75 MG tablet TAKE 1 TABLET BY MOUTH EVERY DAY 30 tablet 5    magnesium oxide (MAG-OX) 400 (240 Mg) MG tablet TAKE 1 TABLET ONCE DAILY 30 tablet 11    busPIRone (BUSPAR) 30 MG tablet Take 30 mg by 07/27/21 0445 07/28/21  0455   WBC 7.6 9.2 6.9   HGB 8.9* 8.0* 7.5*   HCT 28.0* 25.5* 23.8*   MCV 85.5 85.2 85.1    330 261     BMP:   Recent Labs     07/26/21  1228 07/26/21  1817 07/26/21  2226 07/27/21 0445 07/28/21 0455   NA   < > 121* 124* 126* 130*   K   < > 6.1* 5.1 4.8 3.6   CL   < > 89* 91* 92* 100   CO2   < > 12* 16* 19* 17*   PHOS  --  6.7*  --   --   --    BUN   < > 83* 85* 87* 82*   CREATININE   < > 4.8* 4.9* 4.9* 4.6*    < > = values in this interval not displayed. LIVER PROFILE:   Recent Labs     07/26/21  1228 07/27/21 0445 07/28/21 0455   * 566* 292*   * 299* 241*   PROT 7.3 6.6 5.7*   BILITOT 3.3* 3.0* 1.8*   ALKPHOS 393* 364* 309*     PT/INR:   Recent Labs     07/27/21 0445 07/28/21 0455   INR 3.75* 2.53*       IMAGING:  Echo Complete - 4/26/2021   Summary   Definity contrast administered. Left ventricular systolic function is reduced with ejection fraction   estimated at 25 %. There is systolic and diastolic septal flattening consistent with right   ventricular pressure and volume overload. There is akinesis of the entire apex. There is hypokinesis of the anterior and anteroseptal wall. Left ventricular size is mildly increased. Normal left ventricular wall thickness. Grade III diastolic dysfunction with elevated filing pressure. Moderate mitral regurgitation. No vegetation or masses are seen on the mitral valve. Right ventricular systolic function is mildly to moderately reduced . The right ventricle is mildly enlarged. Moderate tricuspid regurgitation. Systolic pulmonary artery pressure (SPAP) estimated at 52 mmHg (RA pressure   8 mmHg), consistent with moderate pulmonary hypertension. No vegetation or masses are seen on the tricuspid valve. The right atrium is mildly dilated. If suspicion for endocarditis is high recommend URI. CT head without contrast - 7/26/2021  Impression   No acute intracranial abnormality.      XR CHEST PORTABLE - 7/26/2021  Impression   1. Stable cardiomegaly. 2. Low lung volumes. XR ABDOMEN (KUB) (SINGLE AP VIEW)  Impression   Right femoral venous catheter projects in normal position. US GALLBLADDER RUQ - 7/27/2021  Impression   1. Patient status post cholecystectomy. 2. Mild nonspecific dilatation of the common bile duct, measuring 12.3 mm in   diameter, without demonstrable cause.  Given history of elevated LFTs,   consider further characterization with a dedicated MRCP study. 3. Unremarkable sonographic appearance of the liver, right kidney, and   pancreas. MRI ABDOMEN WO CONTRAST MRCP - 7/28/2021  Pending     Attending Supervising [de-identified] Attestation Statement  The patient is a 60 y.o. female. I have performed a history and physical examination of the patient. I discussed the case with my physician assistant María Winchester PA-C    I reviewed the patient's Past Medical History, Past Surgical History, Medications, and Allergies. Physical Exam:  Vitals:    07/28/21 1702 07/28/21 1704 07/28/21 1707 07/28/21 1708   BP:   117/82 117/82   Pulse: 103 103 104 104   Resp: 25 24 27 26   Temp:       TempSrc:       SpO2: 98% 100% 100% 100%   Weight:       Height:           Physical Examination: General appearance - alert, well appearing, and in no distress  Mental status - alert, oriented to person, place, and time  Eyes - pupils equal and reactive, extraocular eye movements intact  Neck - supple, no significant adenopathy  Chest - clear to auscultation, no wheezes, rales or rhonchi, symmetric air entry  Heart - normal rate, regular rhythm, normal S1, S2, no murmurs, rubs, clicks or gallops  Abdomen - soft, nontender, nondistended, no masses or organomegaly  Extremities - no pedal edema noted        Assessment:     60year old female with a history of atrial fibrillation, CAD, CKD, COPD, diabetes, GERD, HLD, and HTN admitted with septic shock, acute metabolic encephalopathy, and elevated LFTs. Elevated LFTs likely secondary to severe sepsis and shock vs passive congestion but cannot exclude obstructive pathology. Plan:   Continue supportive care  Monitor LFTs  Monitor and document output  Monitor and replenish electrolytes  Observe for signs of bleeding  Continue broad spectrum antibiotics  Ok to continue statin   Check MRCP  Continue diet as tolerated  PT/OT  Nephrology following - plans for dialysis  Cardiology following - hypotension and CAD management  Will follow    Good Mclaughlin PA-C  10:53 AM 7/28/2021                      60year old female with a history of DM, HTN, HLD, COPD, CKD, CAD, atrial fibrillation, GERD, CHF, pulmonary HTN admitted with sepsis and elevated LFTs. Elevated LFTs likely secondary to passive congestion vs sepsis but cannot exclude obstructive pathology    Continue supportive care. Monitor LFTs. MRCP to ruleout obstruction. Consider repeat cardiac echo. Dialysis tomorrow.     Kentrell Yu MD          [phone]  [phone]

## 2021-07-28 NOTE — PLAN OF CARE
Monitoring patient skin integrity for skin breakdown, turning and repositioning q2h per protocol. Patient demonstrates turning and repositioning self. Continuing to monitor pain and discomfort. Monitoring pain level on scale of 0-10. Non- pharmacological measures encouraged to reduce discomfort/pain. PRN pain meds administeration continues when/if applicable as ordered by physician.

## 2021-07-28 NOTE — RESEARCH
I was sent a message on [facility] about a GI consult request for this patient. The [place] [facility] The nurse should contact [facility] about the consult. Maria Esther Lan is informed about this. I have no patient-physician relationship with this patient.

## 2021-07-28 NOTE — PROGRESS NOTES
Pulmonary & Critical Care Medicine ICU Progress Note    CC: septic shock    Events of Last 24 hours: more alert today; UOP improved. Invasive Lines: femoral cvc    MV:       / / /   Recent Labs     07/26/21  2230   PHART 7.402   [id number] 25.7*   PO2ART 113.2*       IV:   sodium chloride      dextrose         Vitals:  /60   Pulse 100   Temp 97.8 °F (36.6 °C) (Bladder)   Resp 25   Ht 5' 1.5\" (1.562 m)   Wt 234 lb (106.1 kg)   LMP 10/24/2012   SpO2 94%   BMI 43.50 kg/m²   on ra    Intake/Output Summary (Last 24 hours) at 7/28/2021 0815  Last data filed at 7/28/2021 0700  Gross per 24 hour   Intake 1220 ml   Output 812 ml   Net 408 ml     EXAM:  Constitutional: ill appearing. Eyes: PERRL. No sclera icterus. ENT: Normal Nose. Normal Tongue. Neck:  Trachea is midline. No thyroid tenderness. Respiratory: No accessory muscle usage. No wheezes. No rales. No Rhonchi. Cardiovascular: Normal S1S2. Hung Ra No murmur. No digit cyanosis. No digit clubbing. No LE edema. GI: Non-tender. Non-distended. Normal bowel sounds. No masses. No umbilical hernia. Skin: No rash on the exposed extremities. No Nodules on exposed extremities. Neuro: Alert. Follows commands. Moves all extremities. Psych:  No agitation, no anxiety.     Scheduled Meds:   mupirocin   Nasal BID    miconazole   Topical BID    pantoprazole  40 mg Intravenous Daily    sodium chloride flush  5-40 mL Intravenous 2 times per day    insulin lispro  0-6 Units Subcutaneous Q4H    cefepime  1,000 mg Intravenous Q24H    vancomycin (VANCOCIN) intermittent dosing (placeholder)   Other RX Placeholder     PRN Meds:  sodium chloride flush, sodium chloride, ondansetron **OR** ondansetron, polyethylene glycol, acetaminophen **OR** acetaminophen, glucose, dextrose, glucagon (rDNA), dextrose    Results:  CBC:   Recent Labs     07/26/21  1228 07/27/21  0445 07/28/21  0455   WBC 7.6 9.2 6.9   HGB 8.9* 8.0* 7.5*   HCT 28.0* 25.5* 23.8*   MCV 85.5 85.2 85.1  330 261     BMP:   Recent Labs     07/26/21  1228 07/26/21  1817 07/26/21  2226 07/27/21  0445 07/28/21  0455   NA   < > 121* 124* 126* 130*   K   < > 6.1* 5.1 4.8 3.6   CL   < > 89* 91* 92* 100   CO2   < > 12* 16* 19* 17*   PHOS  --  6.7*  --   --   --    BUN   < > 83* 85* 87* 82*   CREATININE   < > 4.8* 4.9* 4.9* 4.6*    < > = values in this interval not displayed.      LIVER PROFILE:   Recent Labs     07/26/21  1228 07/27/21  0445 07/28/21  0455   * 566* 292*   * 299* 241*   BILITOT 3.3* 3.0* 1.8*   ALKPHOS 393* 364* 309*       Blood cx NGTD  Chest imaging was reviewed by me and showed clear lungs     ASSESSMENT:  · Septic shock - source unclear; consider cellulitis or cholecystitis    · Acute metabolic encephalopathy likely due to sepsis and uremia  · LE cellulitis  · Jaundice and elevated LFTs - from sepsis/hypotension vs obstruction  · SHAUNNA  · CHF, EF 25%  · HCV  · DM2  · CLINT not on CPAP  · COPD     PLAN:  · Received 4L IVF  · Renal consulted   · Broad spectrum antibiotics  · monitor LFTs  · Weaned off of levophed  · MRCP today  · Pan culture  · Home inhaler regimen  · Hold plavix and Eliquis for elevated INR and bleeding from CVC site --plan to remove tomorrow if still improving  · Ok to transfer

## 2021-07-28 NOTE — PROGRESS NOTES
Admit: 2021    Name:  Juan Mercer  Room:  [facility]-  MRN:    [id number]    Critical Care Daily Progress Note for 2021   Patient admitted with septic shock, acute encephalopathy and acute on chronic kidney disease    Interval History:     More alert today     Scheduled Meds:   mupirocin   Nasal BID    miconazole   Topical BID    pantoprazole  40 mg Intravenous Daily    sodium chloride flush  5-40 mL Intravenous 2 times per day    insulin lispro  0-6 Units Subcutaneous Q4H    cefepime  1,000 mg Intravenous Q24H    vancomycin (VANCOCIN) intermittent dosing (placeholder)   Other RX Placeholder       Continuous Infusions:   sodium chloride      dextrose         PRN Meds:  sodium chloride flush, sodium chloride, ondansetron **OR** ondansetron, polyethylene glycol, acetaminophen **OR** acetaminophen, glucose, dextrose, glucagon (rDNA), dextrose                  Objective:     Temp  Av °F (36.7 °C)  Min: 97.7 °F (36.5 °C)  Max: 98.8 °F (37.1 °C)  Pulse  Av.2  Min: 90  Max: 105  BP  Min: 77/47  Max: 113/93  SpO2  Av.8 %  Min: 94 %  Max: 100 %  Patient Vitals for the past 4 hrs:   BP Temp Temp src Pulse Resp SpO2   21 1000 (!) 102/59   102 21 100 %   21 0900 (!) 92/54   101 23 96 %   21 0800 110/60 97.8 °F (36.6 °C) Bladder 100 25 94 %   21 0700 100/63   101 23 100 %         Intake/Output Summary (Last 24 hours) at 2021 1035  Last data filed at 2021 0700  Gross per 24 hour   Intake 1200 ml   Output 722 ml   Net 478 ml       Physical Exam:  General: alert,oriented   Mucous Membranes:  Pink , anicteric  Neck: No JVD, no carotid bruit, no thyromegaly  Chest:  Clear to auscultation bilaterally, no added sounds  Cardiovascular:  RRR S1S2 heard, no murmurs or gallops  Abdomen:  Soft, undistended, non tender, no organomegaly, BS present  Extremities: 1+ bilateral pedal edema. Mild redness with the superficial small ulcerations in both legs.   Left foot-tips of multiple toes showing dry ulceration of blackish areas. Neurological : oriented, no focal deficits  Lab Data:  CBC:   Recent Labs     07/26/21  1228 07/27/21 0445 07/28/21 0455   WBC 7.6 9.2 6.9   RBC 3.28* 2.99* 2.80*   HGB 8.9* 8.0* 7.5*   HCT 28.0* 25.5* 23.8*   MCV 85.5 85.2 85.1   RDW 17.4* 17.7* 17.6*    330 261     BMP:   Recent Labs     07/26/21  1228 07/26/21 1817 07/26/21 2226 07/27/21 0445 07/28/21 0455   NA   < > 121* 124* 126* 130*   K   < > 6.1* 5.1 4.8 3.6   CL   < > 89* 91* 92* 100   CO2   < > 12* 16* 19* 17*   PHOS  --  6.7*  --   --   --    BUN   < > 83* 85* 87* 82*   CREATININE   < > 4.8* 4.9* 4.9* 4.6*    < > = values in this interval not displayed. BNP: No results for input(s): BNP in the last 72 hours. PT/INR:   Recent Labs     07/27/21 0445 07/28/21 0455   PROTIME 44.8* 29.7*   INR 3.75* 2.53*     APTT:  Recent Labs     07/28/21 0455   APTT 36.4     CARDIAC ENZYMES:   Recent Labs     07/26/21 1228 07/26/21 1817 07/26/21 2226   TROPONINI 0.05* 0.04* 0.04*     FASTING LIPID PANEL:  Lab Results   Component Value Date    CHOL 80 06/27/2021    HDL 32 06/27/2021    TRIG 51 06/27/2021     LIVER PROFILE:   Recent Labs     07/26/21  1228 07/27/21 0445 07/28/21 0455   * 566* 292*   * 299* 241*   BILITOT 3.3* 3.0* 1.8*   ALKPHOS 393* 364* 309*         Echocardiogram 8/2020  Limited only f/u for LVEF and mitral regurgitation. Technically difficult examination. The left ventricular systolic function is severely reduced with an ejection   fraction of 25%. There is hypokinesis of the apex, apical lateral, apical septum,   anterioseptum and anterior walls. Compared to previous study from 7-1-2020 no changes noted in left   ventricular function. Moderate mitral regurgitation. US GALLBLADDER RUQ   Final Result   1. Patient status post cholecystectomy.    2. Mild nonspecific dilatation of the common bile duct, measuring 12.3 mm in   diameter, skin ([facility] 06/22/2020    Arthritis     Cardiomyopathy ([facility]     Pulmonary hypertension ([facility]     Acute on chronic congestive heart failure ([facility] 02/28/2020    Pulmonary nodule 02/28/2020    Class 2 severe obesity due to excess calories with serious comorbidity and body mass index (BMI) of 39.0 to 39.9 in adult [facility]) 02/28/2020    Bipolar disorder ([facility] 02/28/2020    Chronic systolic congestive heart failure ([facility] 02/21/2020    Dyslipidemia     Vitamin D deficiency 10/29/2019    SHAUNNA (acute kidney injury) ([facility] 09/29/2019    COPD (chronic obstructive pulmonary disease) ([facility] 05/17/2019    Hyponatremia 05/23/2018    Iron deficiency anemia 05/23/2018    GERD (gastroesophageal reflux disease)     Anxiety and depression     Type 2 diabetes mellitus with diabetic polyneuropathy, with long-term current use of insulin ([facility] 12/10/2015    CLINT (obstructive sleep apnea) 12/10/2015    Tobacco abuse disorder 12/10/2015    Abel's esophagus 04/29/2013    Essential hypertension 04/13/2011    Viral hepatitis C 03/10/2010     Septic shock, unclear source. Could be cellulitis in both lower extremities. Received 4 L of IV fluids in the emergency room. Placed on Levophed. Now off pressors. Blood urine and sputum cultures. Continue IV cefepime and vancomycin. Follow culture results. Acute metabolic encephalopathy. Secondary to septic shock. Resolved    Elevated LFTs. No abdominal tenderness. Denies alcohol. Likely secondary to severe sepsis and shock. Obtain right upper quad ultrasound-results reviewed. Follow hepatic profile closely. LFTs declining. GI consult. MRCP today. Acute kidney injury on chronic kidney disease stage IV. Likely ATN in the setting of  septic shock. Received 4 L of IV fluids. Was on Levophed but now off. Nephrology consult. Mild decline in CRTN. Anion gap metabolic acidosis. Secondary to acute kidney injury. Resolving.     Chronic systolic congestive heart failure. Last EF 25% 8/2020. Hold torsemide and metoprolol given low blood pressure readings. Not on ACE/ARB given chronic kidney disease. Coronary disease status post CABG, stents. Elevated troponin secondary to increased demand and chronic kidney disease. Hold aspirin per cardiology for now. Lipitor held secondary to elevated LFTs. History of paroxysmal atrial fibrillation. Currently sinus tachycardia. Eliquis held per cardiology. Type 2 diabetes. Controlled. Placed on sliding scale insulin. COPD. Stable. Continue inhalers. Acute on chronic anemia. Anemia secondary to chronic kidney disease. Drop in H&H over the past 2 to 3 weeks. Hemoccult stool. Hold antiplatelets and Eliquis for now. Hyponatremia. On tolvaptan 3 days a week. Nephrology following. Hyperkalemia. Received  Lokelma    DVT prophylaxis. Shyann Vick MD

## 2021-07-28 NOTE — PROGRESS NOTES
[facility] Progress Note           Subjective/   60y.o. year old female who we are seeing in consultation for A/CKD-4. HPI:  Renal function slow to improve, urine output better at 877 ml over last 24 hours. Bp's better, off pressors now. ROS:  Denies any shortness of breath, no fevers, +weak. Objective/   GEN:  Chronically ill, BP 99/74   Pulse 103   Temp 98.2 °F (36.8 °C) (Bladder)   Resp 22   Ht 5' 1.5\" (1.562 m)   Wt 234 lb (106.1 kg)   LMP 10/24/2012   SpO2 100%   BMI 43.50 kg/m²   HEENT: non-icteric, no JVD  CV: S1, S2 without m/r/g; +++ LE edema  RESP: CTA B without w/r/r; breathing wnl  ABD: +bs, soft, nt, no hsm  SKIN: warm, no rashes    Data/  Recent Labs     07/26/21  1228 07/27/21  0445 07/28/21  0455   WBC 7.6 9.2 6.9   HGB 8.9* 8.0* 7.5*   HCT 28.0* 25.5* 23.8*   MCV 85.5 85.2 85.1    330 261     Recent Labs     07/26/21  1228 07/26/21  1817 07/26/21  2226 07/27/21  0445 07/28/21  0455   NA   < > 121* 124* 126* 130*   K   < > 6.1* 5.1 4.8 3.6   CL   < > 89* 91* 92* 100   CO2   < > 12* 16* 19* 17*   GLUCOSE   < > 255* 183* 161* 205*   PHOS  --  6.7*  --   --   --    MG  --  2.80*  --   --   --    BUN   < > 83* 85* 87* 82*   CREATININE   < > 4.8* 4.9* 4.9* 4.6*   LABGLOM   < > 9* 9* 9* 10*   GFRAA   < > 11* 11* 11* 12*    < > = values in this interval not displayed.        Assessment/         - Acute kidney injury - ATN in setting of suspected septic shock     - Chronic Kidney Disease:  Stage IV             Etiology: Multiple episodes of SHAUNNA, background vascular disease             Highly variable SCr depending on cardiac and volume status but overall worsening              SCr low to mid 2 range at it best, last at 2.4 this month     - Chronic Hypotension:  Impacts pressure natriuresis      - Anion-gap metabolic acidosis - in setting of elevated lactate & SHAUNNA     - Hyponatremia:  Hypervolemic / Due to CHF              On tolvaptan 3 days a week    - Systolic Heart Failure:  EF = 25%                 - Hyperkalemia - better with medical treatment with lokelma       Plan/     - Needs to start dialysis now though patient not interested  - Holding on further IVF's & diuretics on hold  - Trend labs, bp's, urine output    ____________________________________  Henrique Garcia MD  [facility]  [email or web address]  Office: [phone]

## 2021-07-28 NOTE — ACP (ADVANCE CARE PLANNING)
Advance Care Planning     Advance Care Planning Inpatient Note  [facility] Department    Today's Date: 7/28/2021  Unit: [facility] ICU    Received request from patient. Upon review of chart and communication with care team, patient's decision making abilities are not in question. Patient and Child/Childrenwas/were present in the room during visit. Goals of ACP Conversation:  Discuss advance care planning documents  Facilitate a discussion related to patient's goals of care as they align with the patient's values and beliefs. Health Care Decision Makers:      Primary Decision Maker: Gilberto Da Silva \"Lanny\" - Child - [phone]    Secondary Decision Maker: Tash Boss - Brother/Sister - [phone]  Click here to complete [address] including selection of the Healthcare Decision Maker Relationship (ie \"Primary\")     Today we:  Updated [address] (Patient Wishes):  Living Will/Advance Directive  Anatomical Gift/Organ Donation     Assessment:  Sparkle Ortiz was very articulate about her POA's instructions. She stated that if her condition is terminal determined by two doctors, she wants her POA to take her off life support and make her comfortable to make a transition to God. Her daughter understood that she was to carry out her mother's end of life wishes. Karen Zapata daughter is the primary decision maker in the event that she can not speak for herself. Sparkle Ortiz stated that she embraces the unamia. Interventions:  Provided education on documents for clarity and greater understanding. Discussed and provided education on state decision maker hierarchy. Assisted in the completion of documents according to patient's wishes at this time. Encouraged ongoing ACP conversation with future decision makers and loved ones.   Returned original document(s) to patient, as well as copies for distribution to appointed agents. Outcomes/Plan:  ACP Discussion: Completed  New advance directive completed. Copy of advance directive given to staff to scan into medical record.   Teach Back Method used to verify the patient's and/or Healthcare Decision Maker's understanding of key information in the advance directive documents    Electronically signed by Oly Slater, [employer] on 7/28/2021 at 3:31 PM

## 2021-07-29 NOTE — PROGRESS NOTES
Vancomycin Day 4    Vanco random level this am = 18.7 mg/L  Creatinine today is 4.7 (No HD at this time)  No vanco dose today. Will order another level in am.  Continue to pulse dose.   Elina Salazar, [facility]

## 2021-07-29 NOTE — PROGRESS NOTES
Admit: 2021    Name:  Nessa Palmer  Room:  /8296-13  MRN:    [id number]     Progress Note for 2021   57-year-old female with known history of chronic low EF about 25% chronic kidney disease A. fib morbid obesity, diabetes type 2 chronic bilateral lower extremity wound hypotensive and confused     patient was admitted to ICU with possible septic shock and acute metabolic encephalopathy. Patient was started on vasopressors and IV fluids mental status improved patient is transferred out of ICU. She is due for a vascular access to start hemodialysis. Patient blood pressure running somewhat low, please see falling to sleep       Interval History:     Laying in bed able to answer questions and falling back to sleep has not had a bowel movement since admission    Scheduled Meds:   phytonadione (VITAMIN K)  IVPB  10 mg Intravenous Once    insulin lispro  0-3 Units Subcutaneous Nightly    metoprolol tartrate  25 mg Oral BID    insulin lispro  0-18 Units Subcutaneous 4x Daily AC & HS    mupirocin   Nasal BID    miconazole   Topical BID    pantoprazole  40 mg Intravenous Daily    sodium chloride flush  5-40 mL Intravenous 2 times per day    vancomycin (VANCOCIN) intermittent dosing (placeholder)   Other RX Placeholder       Continuous Infusions:   dextrose      sodium chloride         PRN Meds:  midodrine, dextrose, dextrose, sodium chloride flush, sodium chloride, ondansetron **OR** ondansetron, polyethylene glycol, acetaminophen **OR** acetaminophen, glucose, glucagon (rDNA)                  Objective:     Temp  Av.2 °F (36.2 °C)  Min: 96.6 °F (35.9 °C)  Max: 97.8 °F (36.6 °C)  Pulse  Av.4  Min: 97  Max: 106  BP  Min: 99/71  Max: 117/82  SpO2  Av.2 %  Min: 97 %  Max: 100 %  No data found.       Intake/Output Summary (Last 24 hours) at 2021 1337  Last data filed at 2021 1016  Gross per 24 hour   Intake 832 ml   Output 1495 ml   Net -663 ml       Physical Exam:  General: alert,oriented morbidly obese  Mucous Membranes:  Pink , anicteric  Neck: No JVD, no carotid bruit, no thyromegaly  Chest:  Clear to auscultation bilaterally, no added sounds  Cardiovascular:  RRR S1S2 heard, no murmurs or gallops  Abdomen:  Soft, undistended, non tender, no organomegaly, BS present  Extremities: 1+ bilateral pedal edema. Mild redness with the superficial small ulcerations in both legs. Left foot-tips of multiple toes showing dry ulceration of blackish areas. Neurological : oriented, no focal deficits  Lab Data:  CBC:   Recent Labs     07/27/21 0445 07/28/21 0455 07/29/21  0507   WBC 9.2 6.9 7.9   RBC 2.99* 2.80* 2.99*   HGB 8.0* 7.5* 8.0*   HCT 25.5* 23.8* 25.6*   MCV 85.2 85.1 85.7   RDW 17.7* 17.6* 17.6*    261 265     BMP:   Recent Labs     07/26/21  1817 07/26/21  2226 07/27/21 0445 07/28/21  0455 07/29/21  0507   *   < > 126* 130* 127*   K 6.1*   < > 4.8 3.6 4.3   CL 89*   < > 92* 100 92*   CO2 12*   < > 19* 17* 20*   PHOS 6.7*  --   --   --   --    BUN 83*   < > 87* 82* 95*   CREATININE 4.8*   < > 4.9* 4.6* 4.7*    < > = values in this interval not displayed. BNP: No results for input(s): BNP in the last 72 hours. PT/INR:   Recent Labs     07/27/21 0445 07/28/21  0455 07/29/21  0932   PROTIME 44.8* 29.7* 18.2*   INR 3.75* 2.53* 1.58*     APTT:    Recent Labs     07/27/21 0445 07/28/21  0455 07/29/21  0507   * 292* 171*   * 241* 236*   BILITOT 3.0* 1.8* 1.6*   ALKPHOS 364* 309* 385*         Echocardiogram 8/2020  Limited only f/u for LVEF and mitral regurgitation. Technically difficult examination. The left ventricular systolic function is severely reduced with an ejection   fraction of 25%. There is hypokinesis of the apex, apical lateral, apical septum,   anterioseptum and anterior walls. Compared to previous study from 7-1-2020 no changes noted in left   ventricular function. Moderate mitral regurgitation.     MRI ABDOMEN WO CONTRAST MRCP   Final Result   1. Status post cholecystectomy. Proximal common bile duct measures up to 12   mm. The mid to distal common bile duct are normal in course and caliber. Evaluation of the common bile duct is significantly limited by motion. No   obvious choledocholithiasis. 2. Anasarca with small bilateral effusions. US GALLBLADDER RUQ   Final Result   1. Patient status post cholecystectomy. 2. Mild nonspecific dilatation of the common bile duct, measuring 12.3 mm in   diameter, without demonstrable cause. Given history of elevated LFTs,   consider further characterization with a dedicated MRCP study. 3. Unremarkable sonographic appearance of the liver, right kidney, and   pancreas. XR ABDOMEN (KUB) (SINGLE AP VIEW)   Final Result   Right femoral venous catheter projects in normal position. XR CHEST PORTABLE   Final Result   1. Stable cardiomegaly. 2. Low lung volumes. CT head without contrast   Final Result   No acute intracranial abnormality.          IR TUNNELED CVC PLACE WO SQ PORT/PUMP > 5 YEARS    (Results Pending)         Assessment & Plan:     Patient Active Problem List    Diagnosis Date Noted    Mitral valve regurgitation 05/24/2018    CAD (coronary artery disease)     Acute encephalopathy     Sepsis ([address] 07/26/2021    Generalized weakness 07/02/2021    Heart failure ([address] 06/21/2021    Cellulitis 04/22/2021    Closed nondisplaced fracture of navicular bone of left foot     Acute on chronic systolic CHF (congestive heart failure) ([address] 92/20/4143    Acute systolic CHF (congestive heart failure) ([address] 01/11/2021    CHF (congestive heart failure), NYHA class I, acute on chronic, combined ([address] 01/11/2021    DKA, type 2, not at goal [facility]) 12/21/2020    Moderate protein-calorie malnutrition ([address] 12/01/2020    Dyspnea on exertion     Ischemic heart disease     Hypokalemia 11/30/2020    CKD (chronic kidney disease) stage 4, GFR 15-29 ml/min ([address]     Acute kidney injury superimposed on CKD ([address] 08/05/2020    Morbid obesity with BMI of 40.0-44.9, adult ([address] 08/05/2020    PAD (peripheral artery disease) ([address]     Peripheral venous insufficiency 07/02/2020    Chronic renal impairment     Lower extremity edema 06/22/2020    Habitual self-excoriation 06/22/2020    Ulcer of left lower leg, limited to breakdown of skin ([address] 06/22/2020    Ulcer of right leg, limited to breakdown of skin ([address] 06/22/2020    Arthritis     Cardiomyopathy ([address]     Pulmonary hypertension ([address]     Acute on chronic congestive heart failure ([address] 02/28/2020    Pulmonary nodule 02/28/2020    Class 2 severe obesity due to excess calories with serious comorbidity and body mass index (BMI) of 39.0 to 39.9 in adult ([address] 02/28/2020    Bipolar disorder ([address] 02/28/2020    Chronic systolic congestive heart failure ([address] 02/21/2020    Dyslipidemia     Vitamin D deficiency 10/29/2019    SHAUNNA (acute kidney injury) ([address] 09/29/2019    COPD (chronic obstructive pulmonary disease) ([address] 05/17/2019    Hyponatremia 05/23/2018    Iron deficiency anemia 05/23/2018    GERD (gastroesophageal reflux disease)     Anxiety and depression     Type 2 diabetes mellitus with diabetic polyneuropathy, with long-term current use of insulin ([address] 12/10/2015    CLINT (obstructive sleep apnea) 12/10/2015    Tobacco abuse disorder 12/10/2015    Abel's esophagus 04/29/2013    Essential hypertension 04/13/2011    Viral hepatitis C 03/10/2010     Septic shock, unclear source. Could be cellulitis in both lower extremities. Received 4 L of IV fluids in the emergency room. Urine culture grew Enterococcus sensitive to vancomycin blood cultures negative so far  Patient has been somewhat lethargic will discontinue cefepime    Acute metabolic encephalopathy. Secondary to septic shock. Resolved    Elevated LFTs.   GI has been consulted MRCP nonspecific could be passive liver

## 2021-07-29 NOTE — PROGRESS NOTES
Shift assessment completed, PM meds given, PRN tylenol given for c/o back and BLE pain r/t being in bed. 4 eyes skin assessment completed with 2 RNs, f/c in place and draining urine. -boxed lunch given. Lungs diminished on auscultation. In bed with call light in reach.

## 2021-07-29 NOTE — PROGRESS NOTES
Patient very sleepy, falling asleep while having a conversation. Patient states that this is how she has been for a while now.      Will update MD.

## 2021-07-29 NOTE — PROGRESS NOTES
[facility] Progress Note           Subjective/   60y.o. year old female who we are seeing in consultation for A/CKD-4. HPI:  Renal function remains poor, urine output better at 1.6 liters over last 24 hours. Bp's better, off pressors now. ROS:  Denies any shortness of breath, no fevers, +weak. Objective/   GEN:  Chronically ill, BP 99/71   Pulse 97   Temp 97 °F (36.1 °C) (Oral)   Resp 20   Ht 5' 1.5\" (1.562 m)   Wt 259 lb 1.6 oz (117.5 kg) Comment: changed beds  LMP 10/24/2012   SpO2 97%   BMI 48.16 kg/m²   HEENT: non-icteric, no JVD  CV: S1, S2 without m/r/g; +++ LE edema  RESP: CTA B without w/r/r; breathing wnl  ABD: +bs, soft, nt, no hsm  SKIN: warm, no rashes    Data/  Recent Labs     07/27/21  0445 07/28/21  0455 07/29/21  0507   WBC 9.2 6.9 7.9   HGB 8.0* 7.5* 8.0*   HCT 25.5* 23.8* 25.6*   MCV 85.2 85.1 85.7    261 265     Recent Labs     07/26/21  1817 07/26/21  2226 07/27/21  0445 07/28/21  0455 07/29/21  0507   *   < > 126* 130* 127*   K 6.1*   < > 4.8 3.6 4.3   CL 89*   < > 92* 100 92*   CO2 12*   < > 19* 17* 20*   GLUCOSE 255*   < > 161* 205* 210*   PHOS 6.7*  --   --   --   --    MG 2.80*  --   --   --   --    BUN 83*   < > 87* 82* 95*   CREATININE 4.8*   < > 4.9* 4.6* 4.7*   LABGLOM 9*   < > 9* 10* 10*   GFRAA 11*   < > 11* 12* 12*    < > = values in this interval not displayed.        Assessment/         - Acute kidney injury - ATN in setting of suspected septic shock     - Chronic Kidney Disease:  Stage IV             Etiology: Multiple episodes of SHAUNNA, background vascular disease             Highly variable SCr depending on cardiac and volume status but overall worsening              SCr low to mid 2 range at it best, last at 2.4 this month     - Chronic Hypotension:  Impacts pressure natriuresis      - Anion-gap metabolic acidosis - in setting of elevated lactate & SHAUNNA     - Hyponatremia:  Hypervolemic / Due to CHF              On tolvaptan 3 days a week                 - Systolic Heart Failure:  EF = 25%                 - Hyperkalemia - better with medical treatment with lokelma       Plan/     - Needs to start dialysis - first HD in AM  - Radiology consult for [facility] placement  - Holding on further IVF's   - Diuretics on hold  - Trend labs, bp's, urine output    ____________________________________  Natalie Dove MD  [facility]  [email or web address]  Office: [phone]

## 2021-07-29 NOTE — PROGRESS NOTES
[facility]  Cardiology  Progress Note    Admission date:  2021    Reason for follow up visit: CHF, CAD, elevated troponin    HPI/CC: Sybil Lauren is a 60 y.o. female who was admitted 2021 for AMS and diagnosed with sepsis, possibly due to cellulitis. She has been treated for shock, anemia, A/CKD likely now requiring HD. Rhythm has been likely atrial tachycardia, RBBB. Subjective: Feels short of breath today, slight chest pain. Vitals:  Blood pressure 99/71, pulse 97, temperature 97 °F (36.1 °C), temperature source Oral, resp. rate 20, height 5' 1.5\" (1.562 m), weight 259 lb 1.6 oz (117.5 kg), last menstrual period 10/24/2012, SpO2 97 %, not currently breastfeeding.   Temp  Av.4 °F (36.3 °C)  Min: 96.6 °F (35.9 °C)  Max: 98.2 °F (36.8 °C)  Pulse  Av.3  Min: 97  Max: 106  BP  Min: 99/71  Max: 117/82  SpO2  Av.3 %  Min: 97 %  Max: 100 %    24 hour I/O    Intake/Output Summary (Last 24 hours) at 2021 0953  Last data filed at 2021 0951  Gross per 24 hour   Intake 832 ml   Output 1570 ml   Net -738 ml     Current Facility-Administered Medications   Medication Dose Route Frequency Provider Last Rate Last Admin    metoprolol tartrate (LOPRESSOR) tablet 25 mg  25 mg Oral BID Kavitha Shipley MD   25 mg at 21    insulin lispro (HUMALOG) injection vial 0-18 Units  0-18 Units Subcutaneous 4x Daily AC & HS Kavitha Shipley MD   3 Units at 21    mupirocin (BACTROBAN) 2 % ointment   Nasal BID Kavitha Shipley MD   Given at 21 0845    miconazole (MICOTIN) 2 % powder   Topical BID Kavitha Shipley MD   Given at 21 2327    pantoprazole (PROTONIX) injection 40 mg  40 mg Intravenous Daily Kavitha Shipley MD   40 mg at 21 0844    sodium chloride flush 0.9 % injection 5-40 mL  5-40 mL Intravenous 2 times per day Kavitha Shipley MD   10 mL at 21 0875    sodium chloride flush 0.9 % injection 5-40 mL  5-40 mL Intravenous PRN Gladys Turner MD        0.9 % sodium chloride infusion  25 mL Intravenous PRN Gladys Turner MD        ondansetron (ZOFRAN-ODT) disintegrating tablet 4 mg  4 mg Oral Q8H PRN Gladys Turner MD        Or    ondansetron (ZOFRAN) injection 4 mg  4 mg Intravenous Q6H PRN Gladys Turner MD   4 mg at 07/26/21 2043    polyethylene glycol (GLYCOLAX) packet 17 g  17 g Oral Daily PRN Gladys Truner MD        acetaminophen (TYLENOL) tablet 650 mg  650 mg Oral Q6H PRN Gladys Turner MD   650 mg at 07/28/21 2115    Or    acetaminophen (TYLENOL) suppository 650 mg  650 mg Rectal Q6H PRN Gladys Turner MD        glucose (GLUTOSE) 40 % oral gel 15 g  15 g Oral PRN Gladys Turner MD        dextrose 50 % IV solution  12.5 g Intravenous PRN Gladys Turner MD        glucagon (rDNA) injection 1 mg  1 mg Intramuscular PRN Gladys Turner MD        dextrose 5 % solution  100 mL/hr Intravenous PRN Gladys Turner MD        cefepime (MAXIPIME) 1000 mg IVPB minibag  1,000 mg Intravenous Q24H Gladys Turner MD   Stopped at 07/28/21 2148    vancomycin (VANCOCIN) intermittent dosing (placeholder)   Other Indy Feliz MD         Review of Systems   Constitutional: Positive for fatigue. Respiratory: Positive for shortness of breath. Cardiovascular: Positive for leg swelling. Negative for chest pain and palpitations. Neurological: Negative.       Objective:     Telemetry monitor: WCT, probable AT, RBBB    Physical Exam:  Constitutional:  Comfortable and alert, NAD, appears older than stated age  Eyes: PERRL, sclera nonicteric  Neck:  Supple, no masses, no thyroidmegaly, +JVD  Skin:  Warm and dry; no rash or lesions  Heart: Regular, normal apex, S1 and S2 normal, no M/G/R  Lungs:  Increased respiratory effort; diminished  Abdomen: soft, non tender, + bowel sounds  Extremities:  +wounds/discoloration, wrapped  Neuro: alert and oriented, moves legs and arms equally, normal mood and affect    Data Reviewed:    Echo 4/26/2021:   Definity contrast administered. Left ventricular systolic function is reduced with ejection fraction   estimated at 25 %. There is systolic and diastolic septal flattening consistent with right   ventricular pressure and volume overload. There is akinesis of the entire apex. There is hypokinesis of the anterior and anteroseptal wall. Left ventricular size is mildly increased. Normal left ventricular wall thickness. Grade III diastolic dysfunction with elevated filing pressure. Moderate mitral regurgitation. No vegetation or masses are seen on the mitral valve. Right ventricular systolic function is mildly to moderately reduced . The right ventricle is mildly enlarged. Moderate tricuspid regurgitation. Systolic pulmonary artery pressure (SPAP) estimated at 52 mmHg (RA pressure   8 mmHg), consistent with moderate pulmonary hypertension. No vegetation or masses are seen on the tricuspid valve. The right atrium is mildly dilated. If suspicion for endocarditis is high recommend URI. Coronary angiogram 9/2020:  Cardiomyopathy  Acute systolic heart failure  Non-STEMI  PROCEDURES PERFORMED    Right heart catheterization  Left heart catheterization  Coronary angiogam  Coronary cath  Nonselective LIMA angiogram  Left subclavian  PROCEDURE DESCRIPTION   Risks/benefits/alternatives/outcomes were discussed with patient and/or family and informed consent was obtained. Right radial artery was palpated and assessed by ultrasound was not felt to be viable for cannulation and as such attention turned towards the right groin where local anesthetic was applied.   Using micropuncture kit and ultrasound guidance as well as fluoroscopic guidance, a 4/5 Terumo slender sheath was inserted into the right common femoral artery and a 7 Fijian sheath was inserted into the right common femoral vein. Standard PA catheter was used for hemodynamic assessment for right heart catheterization and this was removed at the end of the case. 5 Fijian pigtail was used for left heart catheterization, LV gram was deferred due to renal insufficiency. 5 Western Evelin JL4 and 3 RC catheters were used for diagnostic coronary angiograms as well as for left subclavian and nonselective LIMA angiograms. At the conclusion of the procedure, a TR band was placed over the puncture site and hemostasis was obtained. There were no immediate complications. I supervised sedation with versed 2 mg/fentanyl 100 Mcg during the procedure. 50 cc contrast was utilized. <20cc EBL.  At end of procedure, CardioMEMS device was interrogated and it showed good correlation with Herman numbers. As such, lines were pulled at end of case. FINDINGS     CHAMBER PRESSURE SATURATION   RA  15  54 %   RV  62/16     PA  69/20  51 %   PW  19 with V waves to 25     AORTA  118/63  94 %      HERMILA CARDIAC OUTPUT  5.4 L/min   SVR  1075 L/min   PVR  343 L/min    Left subclavian angiogram shows proximal 95 to 99% stenosis. There appears to be retrograde filling of the left subclavian artery by way of the LIMA and there appears to be coronary steal.  The vertebral arteries not well visualized. LVEDP  22   GRADIENT ACROSS AORTIC VALVE  none     LM  Proximal less than 10% stenosis, mid 10 to 20% stenosis, distal 70% stenosis.     Overall similar appearance to prior angiograms         LAD  Ostial 70% stenosis, the LAD is extensively stented in the proximal and mid segments, the stents appear to be widely patent with 20% in-stent restenosis, distal vessel has less than 10% stenosis.   There is retrograde filling into the LIMA to LAD graft that fills the left subclavian artery.     D1 is a small vessel with ostial 90% stenosis.     Overall similar appearance to prior angiograms         LCX  Circumflex is stented throughout the proximal and mid and distal segments into OM 1 which is a large vessel. Stents are widely patent with 10 to 20% in-stent restenosis. The stents jailed the AV groove circumflex and at the mid AV groove circumflex, there is a 70 to 80% stenosis.           RCA Dominant, medium vessel, heavily calcified, proximalmid 60 to 70% stenosis, distal less than 10% stenosis, overall similar angiographically as compared to prior angiograms. LIMA-LAD  Visualized nonselectively through retrograde filling from the native LAD, it is not visualized with antegrade flow from the left subclavian artery injection it appears to be widely patent with less than 10% proximalmiddistal stenosis. CONCLUSIONS:    Elevated filling pressures, continue diuresis  CardioMEMS had good correlation with the right heart catheterization numbers. Severe pulmonary hypertension  Patent LIMA to LAD graft however there appears to be coronary steal phenomenon due to severe left subclavian stenosis  We will consider left subclavian intervention  Will obtain follow-up vascular ultrasound to assess this  We will consult with CT surgery regarding therapeutic options    CABG 1/27/2020:  Urgent CABG X 1, with pedicled LIMA to LAD, SGC, CPB with On pump-beating heart, URI, Epiaortic ultrasound, Doppler verification of grafts, Bilateral 5 level intercostal nerve block(Exparel), Platelet gel application.   Sternal plating.  Vas Cath placement.     Coronary angiogram 1/20/2020:  Dominance : Right  LM: 70-80% short distal stenosis  LAD: 70-80% short ostial stenosis, extending into takeoff of high first diagonal; large proximal to mid stented segment patent with jailed second diagonal  (80% ostial D2) and 70% in stent restenosis of most distal stent; distal to near apical LAD with minimal disease  LCx: 70-80% short ostial stenosis, prior to patent proximal to mid stents   RCA: large, dominant vessel with long 60% proximal to mid stenosis   LVEDP: 4 mmHG  LVG not done due to CKD.     Diabetic with previous LAD and LCx stenting in 2018 returns with unstable angina, in stent restenosis and Left Main bifurcation disease. Recommend CTS evaluation inpatient to discuss surgical revascularization option. Hold Clopidogrel.     Lab Reviewed:     Renal Profile:  Lab Results   Component Value Date    CREATININE 4.7 07/29/2021    BUN 95 07/29/2021     07/29/2021    K 4.3 07/29/2021    CL 92 07/29/2021    CO2 20 07/29/2021     CBC:    Lab Results   Component Value Date    WBC 7.9 07/29/2021    RBC 2.99 07/29/2021    HGB 8.0 07/29/2021    HCT 25.6 07/29/2021    MCV 85.7 07/29/2021    RDW 17.6 07/29/2021     07/29/2021     BNP:    Lab Results   Component Value Date    PROBNP 7,507 07/26/2021    PROBNP 4,856 07/20/2021    PROBNP 4,257 07/12/2021    PROBNP 3,432 06/26/2021    PROBNP 11,221 06/21/2021     Fasting Lipid Panel:    Lab Results   Component Value Date    CHOL 80 06/27/2021    HDL 32 06/27/2021    TRIG 51 06/27/2021     Cardiac Enzymes:  CK/MbTroponin  Lab Results   Component Value Date    CKTOTAL 86 06/27/2021    TROPONINI 0.04 07/26/2021     PT/ INR   Lab Results   Component Value Date    INR 2.53 07/28/2021    INR 3.75 07/27/2021    INR 1.16 06/22/2021    PROTIME 29.7 07/28/2021    PROTIME 44.8 07/27/2021    PROTIME 13.5 06/22/2021     PTT No results found for: PTT   Lab Results   Component Value Date    MG 2.80 07/26/2021      Lab Results   Component Value Date    TSH 5.67 06/27/2021     All labs and imaging reviewed today    Assessment:  Elevated troponin: flat  WCT: appears AT with RBBB, rates stable  CAD: patent LIMA-LAD, known LCx and RCA disease on angiogram 9/2020; s/p LIMA-LAD 1/27/2020; s/p multiple PCIs  Chronic systolic CHF: ongoing              - s/p cardiomems 2/14/2019  Ischemic cardiomyopathy: known history, EF 25% on echo 4/2021; 35-40% on URI 1/24/2020, 25% on echo 1/22/2020  Hypotension: stable  RBBB  Paroxysmal atrial arrhythmias (AT, afib, aflutter): ongoing   - [id number] score >2  PAD: s/p L subclavian stent 9/2020; s/p left subclavian bypass  Renal artery stenosis: s/p L renal stent 2020  A/CKD: ongoing  Septic shock: off vasopressors  Anemia  Elevated LFTs  Encephalopathy: improved  COPD  DM  CLINT    Plan:   1. Holding plavix and eliquis due to anemia and need for dialysis catheter, restart when appropriate  2. Holding statin due to elevated LFTs  3. Metoprolol 25 mg BID as BP allows; would recommend carvedilol or toprol prior to discharge due to cardiomyopathy  4. No ACE/ARB due to hypotension and A/CKD  5. HD plans/fluid management per nephrology  6. GI following for MRCP  7. Antibiotics per IM  8. Recommend GDMT and if EF remains <35%, consider ICD  9. Nothing further to add from cardiac perspective, will sign off. Please call with questions. Follow up arranged.      TRAY Flower-CNP  [facility]  [phone]

## 2021-07-29 NOTE — BRIEF OP NOTE
Brief Postoperative Note      Patient: Amadou Rainey  YOB: 1965  MRN: [id number]    Pre-operative Diagnosis: Renal failure. Need for hemodialysis    Post-operative Diagnosis: Same    Procedure: US and fluoro guided RIJ tunneled hemodialysis catheter placement    Anesthesia: Local and Fentanyl. No Versed    Surgeons/Assistants: Dr. Dewayne Lopez    Estimated Blood Loss: less than 5ml     Complications: None    Specimens: Was Not Obtained    Findings: RIJ 23cm tip to cuff tunneled HD catheter placement with tip in the upper right atrium. Aspirated, flushed and locked with heparin. Okay to use.     Electronically signed by Adelaide Richardson MD on 7/29/2021 at 2:51 PM

## 2021-07-29 NOTE — PROGRESS NOTES
Image guided tunneled dialysis catheter completed. Dr. Rajesh Beltran placed a 14.5 23 cm Palindrome Chronic Tunneled Dialysis Catheter LOT [id number] EXP 2025-06-17 in the right IJ line aspirates and flushes with ease. Dialysis catheter secured with sutures. Surgical site dressing clean, dry, and intact. Each dialysis access lumen heparin locked with 1.9 ml of IV heparin each. Pt tolerated procedure without any signs or symptoms of distress. Vital signs stable. Report called to [facility] on PCU. Pt transported back to PCU in stable condition via bed by transport. Line ok to use per Deon.     Vital Signs  Vitals:    07/29/21 1440   BP: 115/71   Pulse: 100   Resp: 18   Temp:    SpO2: 96%        Post Scarlett Score  2 - Able to move 4 extremities voluntarily on command  2 - BP+/- 20mmHg of normal  2 - Able to maintain oxygen saturation >92% on room air  2 - Able to breathe deeply and cough freely  1 - Arousable on calling      50 mcg Fentanyl  Josie Badillo RN

## 2021-07-29 NOTE — PROGRESS NOTES
Inpatient Occupational Therapy Treatment    Unit: ICU  Date:  7/29/2021  Patient Name:    Severiano Bautista  Admitting diagnosis:  Sepsis [facility]) [A41.9]  Admit Date:  7/26/2021  Precautions/Restrictions/WB Status/ Lines/ Wounds/ Oxygen: fall risk, IV, bed/chair alarm, roche catheter , ICU monitoring and telesitter    Treatment Time:  9734 - 6189  Treatment Number: 2    Billable Treatment Time: 25 minutes   Total Treatment Time:   25   minutes    Patient Goals for Therapy:  \" get up to chair \"      Discharge Recommendations: SNF  DME needs for discharge: defer to facility       Therapy recommendations for staff:   Assist of 2 with use of rolling walker (RW) for all transfers to/from BSC/chair    History of Present Illness: Per H&P [facility] Moon 927/21  66-year-old   female who is a poor historian right now, in and out of alertness,  presenting to hospital with what appears to be altered mentation since  the last couple of days, progressive at the nursing facility, who was  apparently picking at her skin wounds, was found to be hypotensive and  confused and hence unable to provide a very good history without nausea,  vomiting, fevers, or chills. Per the documentation in the EMR, the  patient was just staring at the ceiling and hence much history could not  be obtained by the EMS.    PAST MEDICAL HISTORY:  1. Chronic kidney disease. 2.  Chronic combined systolic and diastolic congestive heart failure. 3.  Atrial fibrillation. 4.  Coronary artery disease with cardiomyopathy. 5.  Morbid obesity with a BMI of 43.5 kg/m2. 6.  Type 2 diabetes. 7.  Chronic bilateral lower extremity wounds.     Home Health S4 Level Recommendation:  NA  AM-PAC Score: AM-PAC Inpatient Daily Activity Raw Score: 13    Preadmission Environment    Pt.  Lives with friend (able to provide 24/7 assist if needed)  Home environment:  mobile home/trailer  Steps to enter first floor:   5 steps to enter  With bilateral HRs  Steps to second floor: N/A  Bathroom:  Tub/Shower unit and Tub Transfer Bench, standard height toilet, Pt uses BSC in living room  Equipment owned:  [facility], Standard Walker (SW) and Rollator  BSC, recliner chair,   Pt sleeps on couch at home    Preadmission Status / PLOF:  History of falls   Yes, patient reports falling in tub at home, and recovered without injury  Pt. Able to drive   Yes  Pt Fully independent with ADL's  Yes  Pt. Required assistance from family for:  Cleaning, Cooking and Laundry   Pts friend performs  Pt. Fully independent for transfers and gait and walked with: No Device   Retired from concrete work    Pain  Yes  Ratin-9  Location:chronic back pain  Pain Medicine Status: No request made      Cognition    A&O Person, Place and Time   Able to follow 2 step commands    Subjective  Patient lying supine in bed with friend present. Requesting to move to chair. Per RN wali. Pt agreeable to this OT tx. Nurse in at end of session to take pt for transport. Upper Extremity ROM:    Impaired R shld flexion 0-90, elbow to distal WFLs, pt reports she needs RCR  Left UE WFLs    Upper Extremity Strength:    BUE strength impaired but not formally assessed w/ MMT    Upper Extremity Sensation    WFL    Upper Extremity Proprioception:  WFL    Coordination and Tone  WFL    Balance  Functional Sitting Balance:  Impaired SBA - pt anxious about falling backwards   Functional Standing Balance:Impaired Min A x2 with RW - stood approx 1.5 mins @ RW w/ cues for breathing management     Bed mobility:    Supine to sit:   Mod A  Sit to supine:   Min A and Mod A  Rolling:    Not Tested  Scooting in sitting:  Not Tested  Scooting to head of bed: Mod A of 2    Bridging:   Not Tested    Transfers:    Sit to stand:   Mod A of 2 and Max A of 2 - Max for first attempt up from EOB, Mod for x2 trials up from recliner   Stand to sit:  Min A of 2  Bed to chair:   Mod A of 2 with RW  Standard toilet: Not Tested  Bed to [facility]:  Not

## 2021-07-29 NOTE — PROGRESS NOTES
Physical Therapy    Inpatient Physical Therapy Daily Treatment Note    Unit: PCU  Date:  7/29/2021  Patient Name:    Gari Goltz Long  Admitting diagnosis:  Sepsis [facility]) [A41.9]  Admit Date:  7/26/2021  Precautions/Restrictions:  Fall risk, Bed/chair alarm, Lines -IV and Lees catheter, [place] (hard of hearing) and Telemetry      Discharge Recommendations: SNF  DME needs for discharge: defer to facility       Therapy recommendation for EMS Transport: requires transport by cot due to requires 2 person assist for transfers    Therapy recommendations for staff:   Assist of 2 with use of rolling walker (RW) for all transfers to/from chair    History of Present Illness: Per H&P Frank Moon 927/21 68-year-old  female who is a poor historian right now, in and out of alertness, presenting to hospital with what appears to be altered mentation since the last couple of days, progressive at the nursing facility, who was apparently picking at her skin wounds, was found to be hypotensive and confused and hence unable to provide a very good history without nausea, vomiting, fevers, or chills.  Per the documentation in the EMR, the patient was just staring at the ceiling and hence much history could not be obtained by the EMS. PAST MEDICAL HISTORY:  1.  Chronic kidney disease. 2.  Chronic combined systolic and diastolic congestive heart failure. 3.  Atrial fibrillation. 4.  Coronary artery disease with cardiomyopathy. 5.  Morbid obesity with a BMI of 43.5 kg/m2. 6.  Type 2 diabetes. 7.  Chronic bilateral lower extremity wounds. Home Health S4 Level Recommendation: NA  AM-PAC Mobility Score   AM-PAC Inpatient Mobility Raw Score : 11       Treatment Time:  2860-0201  Treatment number: 2  Timed Code Treatment Minutes: 25 minutes  Total Treatment Minutes:  25  minutes    Cognition    A&O x4   Able to follow 2 step commands    Subjective  Patient lying supine in bed with friend present   Pt agreeable to this PT tx. Pt requests to get to chair    Pain   Yes  Location: chronic LBP  Ratin/10  Pain Medicine Status: No request made     Bed Mobility   Supine to Sit:    Mod A assist at legs and trunk  Sit to Supine: Mod A assist at legs and trunk  Rolling:   Not Tested  Scooting: Mod A  and 2 persons to scoot to [facility]    Transfer Training     Sit to stand: Mod A , Max A  and 2 persons  Stand to sit:   Min A  and 2 persons  Bed to Chair:   Mod A  and 2 persons with use of gait belt and rolling walker (RW)   Comment: Pt performs sit <> stand from EOB with max(A)x2; then performs stand pivot transfer from EOB to chair with mod(A)x2; Pt performs sit <> stand from chair with improvement with use of UE on chair and mod(A)x2; Pt then performs stand pivot transfer from chair to bed with mod(A)x2     Gait Training gait completed as indicated below  Distance:      3 ft x2  Deviations (firm surface/linoleum):  decreased tamir, increased GABINO and shuffles  Assistive Device Used:    gait belt and rolling walker (RW)  Level of Assist:    Mod A  and 2 persons  Comment: Assist for improved weight shift, verbal cues for safety and pacing self appropriately    Stair Training deferred, pt unsafe/not appropriate to complete stairs at this time      Therapeutic Exercise Vaishali deferred secondary to treatment focus on functional mobility  NA    Balance  Sitting:  Fair +; SBA  Comments: Pt reports feeling anxious about falling backwards- cues for breathing techniques    Standing: Good - ; Min A  and 2 persons  Comments: Pt stood ~90 seconds at RW and cues for breathing techniques as pt reports fear of falling. Attempted to have pt perform weight shift L to R but pt reports feeling to anxious to complete. Patient Education      Role of PT, POC, Discharge recommendations, safety awareness, transfer techniques, pursed lip breathing, pacing activity, HEP and calling for assist with mobility.      Positioning Needs       Pt in bed, no alarm needed, positioned in proper neutral alignment and pressure relief provided. Call light provided and all needs within reach   Transport present to take pt to procedure    Activity Tolerance   Pt completed therapy session with No adverse symptoms noted w/activity. Assessment :  Patient continues to progress slowly with therapy. Pt is limited by anxiety, fear of falling, chronic LBP and decreased endurance. Pt performed bed mobility with mod(A), performed stand pivot transfer with mod(A)x2 and STS with max(A)x2 improving to mod(A)x2. Pt would benefit from continued skilled PT to address deficits listed above. Recommending SNF upon discharge as patient functioning well below baseline, demonstrates good rehab potential and unable to return home due to limited or no family support, inability to negotiate stairs to enter home/bedroom/bathroom, burden of care beyond caregiver ability, home environment not conducive to patient recovery and limited safety awareness. Goals (all goals ongoing unless otherwise indicated)  To be met in 3 visits:  1). Independent with LE Ex x 10 reps     To be met in 6 visits:  1). Supine to/from sit: Mod A   2). Sit to/from stand: Mod A   3). Bed to chair: Mod A   4). Gait: Ambulate 50 ft.  with  Mod A  and use of LRAD (least restrictive assistive device)  5). Tolerate B LE exercises 3 sets of 10-15 reps  6). Ascend/descend 5 steps with Min A  with use of hand rail bilateral and LRAD (least restrictive assistive device)    Plan   Continue with plan of care. Signature: Achilles Rater, PT, DPT      If patient discharges from this facility prior to next visit, this note will serve as the Discharge Summary.

## 2021-07-29 NOTE — PROGRESS NOTES
Consent for vascular catheter received and placed in patient's chart. Patient off the floor, to radiology at this time.

## 2021-07-29 NOTE — PLAN OF CARE
Problem: Skin Integrity:  Goal: Will show no infection signs and symptoms  Description: Will show no infection signs and symptoms  7/28/2021 2202 by Dal Koyanagi, RN  Outcome: Ongoing  7/28/2021 0848 by Mary Patel RN  Outcome: Ongoing  Goal: Absence of new skin breakdown  Description: Absence of new skin breakdown  7/28/2021 2202 by Dal Koyanagi, RN  Outcome: Ongoing  7/28/2021 0848 by Mary Patel RN  Outcome: Ongoing     Problem: Pain:  Description: Pain management should include both nonpharmacologic and pharmacologic interventions.   Goal: Pain level will decrease  Description: Pain level will decrease  Outcome: Ongoing  Goal: Control of acute pain  Description: Control of acute pain  Outcome: Ongoing  Goal: Control of chronic pain  Description: Control of chronic pain  Outcome: Ongoing

## 2021-07-29 NOTE — PROGRESS NOTES
PROGRESS NOTE  S:55 yrs Patient  admitted on 7/26/2021 with Sepsis ([facility] [address] [A41.9] . Today she complains of bloating, fatigue, and SOB. Exam:   Vitals:    07/29/21 0730   BP: 99/71   Pulse: 97   Resp: 20   Temp: 97 °F (36.1 °C)   SpO2: 97%      General appearance: appears stated age, cooperative, fatigued, no distress, morbidly obese and syndromic appearance - chronically ill appearing  HEENT: Neck supple with midline trachea  Neck: no adenopathy and supple, symmetrical, trachea midline  Lungs: clear to auscultation bilaterally  Heart: regular rate and rhythm, S1, S2 normal, no murmur, click, rub or gallop  Abdomen: normal findings: bowel sounds normal, no masses palpable, soft, non-tender and symmetric and abnormal findings:  distended and obese  Extremities: edema 2+ BLE     Medications: Reviewed    Labs:  CBC:   Recent Labs     07/27/21 0445 07/28/21 0455 07/29/21  0507   WBC 9.2 6.9 7.9   HGB 8.0* 7.5* 8.0*   HCT 25.5* 23.8* 25.6*   MCV 85.2 85.1 85.7    261 265     BMP:   Recent Labs     07/26/21  1817 07/26/21  2226 07/27/21 0445 07/28/21  0455 07/29/21  0507   *   < > 126* 130* 127*   K 6.1*   < > 4.8 3.6 4.3   CL 89*   < > 92* 100 92*   CO2 12*   < > 19* 17* 20*   PHOS 6.7*  --   --   --   --    BUN 83*   < > 87* 82* 95*   CREATININE 4.8*   < > 4.9* 4.6* 4.7*    < > = values in this interval not displayed. LIVER PROFILE:   Recent Labs     07/27/21 0445 07/28/21 0455 07/29/21  0507   * 292* 171*   * 241* 236*   PROT 6.6 5.7* 7.0   BILITOT 3.0* 1.8* 1.6*   ALKPHOS 364* 309* 385*     PT/INR:   Recent Labs     07/27/21 0445 07/28/21  0455 07/29/21  0932   INR 3.75* 2.53* 1.58*       IMAGING:  MRI ABDOMEN WO CONTRAST MRCP - 7/28/2021  Impression   1. Status post cholecystectomy.  Proximal common bile duct measures up to 12   mm.  The mid to distal common bile duct are normal in course and caliber.    Evaluation of the common bile duct is significantly limited by motion.  No   obvious choledocholithiasis. 2. Anasarca with small bilateral effusions. Attending Supervising [de-identified] Attestation Statement  The patient is a 60 y.o. female. I have performed a history and physical examination of the patient. I discussed the case with my physician assistant Carly Emerson PA-C    I reviewed the patient's Past Medical History, Past Surgical History, Medications, and Allergies. Physical Exam:  Vitals:    07/29/21 1423 07/29/21 1434 07/29/21 1440 07/29/21 1523   BP: 107/70 119/72 115/71 116/82   Pulse: 99 100 100 96   Resp: 20 16 18 20   Temp:    97.4 °F (36.3 °C)   TempSrc:    Temporal   SpO2: 98% 94% 96% 97%   Weight:       Height:           Physical Examination: General appearance - chronically ill appearing  Mental status - alert, oriented to person, place, and time  Eyes - pupils equal and reactive, extraocular eye movements intact  Neck - supple, no significant adenopathy  Chest - clear to auscultation, no wheezes, rales or rhonchi, symmetric air entry  Heart - normal rate, regular rhythm, normal S1, S2, no murmurs, rubs, clicks or gallops  Abdomen - soft, nontender, nondistended, no masses or organomegaly  Extremities - 2+ edema        Impression: 60year old female with a history of DM, HTN, HLD, COPD, CKD, CAD, atrial fibrillation, GERD, CHF, pulmonary HTN admitted with sepsis and elevated LFTs. Elevated LFTs likely secondary to passive congestion vs sepsis but cannot exclude obstructive pathology.       Recommendation:  Continue supportive care  Monitor LFTs  Monitor and document output  Monitor and replenish electrolytes  Observe for signs of bleeding  Continue broad spectrum antibiotics  Continue diet as tolerated  PT/OT  Nephrology following - plans for dialysis today  Cardiology following - hypotension and CAD management  Consider repeat cardiac echo   Will follow      Misty Desouza PA-C  10:10 AM 7/29/2021 60year old female with a history of DM, HTN, HLD, COPD, CKD, CAD, atrial fibrillation, GERD, CHF, pulmonary HTN admitted with sepsis and elevated LFTs. Elevated LFTs likely secondary to passive congestion vs sepsis. MRCP negative for obvious choledocholithiasis     Continue supportive care. Monitor LFTs. Consider repeat cardiac echo. Dialysis today. PT/OT. Encourage nutrition.  Will follow    Alexandre Del Rio MD          [phone]  Joe Valeri

## 2021-07-29 NOTE — PROGRESS NOTES
Patient reported during bedside report that she is coping with being told she needs dialysis in a \"sarcastic way\" because she is not sure how to handle this news. Spoke with patient in depth regarding labs, plan of care, and recommendations per physicians. Patient stated that she does want to pursue dialysis if it means she can live longer. Contacted Dr. Ab Christianson and relayed patient's wishes. Dr. Ab Christianson to place orders for vascular catheter and required lab work.

## 2021-07-29 NOTE — PROGRESS NOTES
Patient admitted to room 312 from ICU. Patient oriented to room, call light, bed rails, phone, lights and bathroom. Patient instructed about the schedule of the day including: vital sign frequency, lab draws, possible tests, frequency of MD and staff rounds, daily weights, I &O's and prescribed diet. The  bed alarm in place, patient aware of placement and reason. PCU Telemetry box in place, patient aware of placement and reason. Bed locked, in lowest position, side rails up 2/4, call light within reach. Recliner Assessment  Patient is not able to demonstrated the ability to move from a reclining position to an upright position within the recliner. however patient is alert, oriented and able to provide informed consent    4 Eyes Skin Assessment     The patient is being assess for   Transfer to New Unit    I agree that 2 RN's have performed a thorough Head to Toe Skin Assessment on the patient. ALL assessment sites listed below have been assessed. Areas assessed for pressure by both nurses:   [x]   Head, Face, and Ears   [x]   Shoulders, Back, and Chest, Abdomen  [x]   Arms, Elbows, and Hands   [x]   Coccyx, Sacrum, and Ischium  [x]   Legs, Feet, and Heels    -Wound cluster with obvious necrosis to L toes-1,2,3.  -Venous ulcers to bilateral lower extremities. -Scattered abrasions and bruising, lrg bruise to R shoulder noted. -Dry redness in groin, abd folds, under dinora breasts. Skin Assessed Under all Medical Devices by both nurses:  roche and ace wrap              All Mepilex Borders were peeled back and area peeked at by both nurses:  Yes  Please list where Mepilex Borders are located:  N/a             **SHARE this note so that the co-signing nurse is able to place an eSignature**    Co-signer eSignature: {Esignature:[id number]}    Does the Patient have Skin Breakdown related to pressure?   Yes LDA WOUND CARE was Initiated documentation include the Shaye-wound, Wound Assessment, Measurements, Dressing Treatment, Drainage, and Color\",     (Insert Photo here N/a)         Vivek Prevention initiated:  Yes   Wound Care Orders initiated:  Yes      [id number] nurse consulted for Pressure Injury (Stage 3,4, Unstageable, DTI, NWPT, Complex wounds)and New or Established Ostomies:  Yes      Primary Nurse eSignature: Electronically signed by Leonel Piper RN on 7/28/21 at 10:00 PM EDT

## 2021-07-30 PROBLEM — R57.1 HYPOVOLEMIC SHOCK (HCC): Status: ACTIVE | Noted: 2018-05-23

## 2021-07-30 NOTE — PROGRESS NOTES
Shift assessment completed and morning medications administered whole with water. Wound care completed to bilateral lower extremity healing venous stasis wounds (vaseline/gauze,tubi ). Patient updated on plan of care and dialysis planned for today. Patient denies pain/discomfort at present time, reported that she slept all night last night. Call light in reach. Bed alarm on. Will continue to monitor.

## 2021-07-30 NOTE — PROGRESS NOTES
[facility] Progress Note           Subjective/   60y.o. year old female who we are seeing in consultation for A/CKD-4, now ESRD, on   HD since 7/30. HPI:  Seen on dialysis. Orders confirmed. Pre-weight at HD today 118.6 kg. Renal function remains poor, urine output better at 1.4 liters over last 24 hours. Bp's better though on lower side. ROS:  C/o shortness of breath, no fevers, +weak.     Objective/   GEN:  Chronically ill, /76   Pulse 102   Temp 97.2 °F (36.2 °C) (Oral)   Resp 20   Ht 5' 1.5\" (1.562 m)   Wt 261 lb 6.4 oz (118.6 kg)   LMP 10/24/2012   SpO2 98%   BMI 48.59 kg/m²   HEENT: non-icteric, no JVD  CV: S1, S2 without m/r/g; +++ LE edema  RESP: CTA B without w/r/r; breathing wnl  ABD: +bs, soft, nt, no hsm  SKIN: warm, no rashes  ACCESS: R IJ TDC (7/29)    Data/  Recent Labs     07/28/21  0455 07/29/21  0507 07/30/21  0553   WBC 6.9 7.9 5.4   HGB 7.5* 8.0* 7.4*   HCT 23.8* 25.6* 23.6*   MCV 85.1 85.7 85.3    265 219     Recent Labs     07/28/21  0455 07/29/21  0507 07/30/21  0553   * 127* 132*   K 3.6 4.3 4.0    92* 100   CO2 17* 20* 19*   GLUCOSE 205* 210* 135*   BUN 82* 95* 86*   CREATININE 4.6* 4.7* 3.9*   LABGLOM 10* 10* 12*   GFRAA 12* 12* 14*       Assessment/         - Acute kidney injury - ATN in setting of suspected septic shock     - Chronic Kidney Disease:  Stage IV - now ESRD             Etiology: Multiple episodes of SHAUNNA, background vascular disease             Highly variable SCr depending on cardiac and volume status but overall worsening              SCr low to mid 2 range at it best, last at 2.4 this month   Follows with Dr. Arthur Orozco in our office     - Chronic Hypotension:  Impacts pressure natriuresis      - Anion-gap metabolic acidosis - in setting of elevated lactate & SHAUNNA     - Hyponatremia:  Hypervolemic / Due to CHF              On tolvaptan 3 days a week                 - Systolic Heart Failure:  EF = 25%    - Hyperkalemia - better with medical treatment with lokelma     - Anemia - trend with UF, monitoring for BELKIS needs       Plan/     - HD today with 2-3 L UF target with prn albumin, crit line assistance   - Resume home dose of torsemide 50 mg a day in AM  - Next HD on Monday  - Trend labs, bp's, urine output    Needs outpatient HD placement as ESRD  Case d/w Dr. Chu Stratton &  - looking to go to [facility] since she resides at the [facility] there    ____________________________________  Sruthi Phipps MD  [facility] [address]  Office: [phone]

## 2021-07-30 NOTE — PROGRESS NOTES
PROGRESS NOTE  S:55 yrs Patient  admitted on 7/26/2021 with Sepsis ([facility] [address] [A41.9] . Today she complains of fatigue, cramping, and bloating. She has not passed a BM since admission. She is tolerating diet. She will start dialysis today. Exam:   Vitals:    07/30/21 0800   BP: 119/76   Pulse: 102   Resp: 20   Temp: 97.2 °F (36.2 °C)   SpO2:       General appearance: appears stated age, cooperative, fatigued, morbidly obese and syndromic appearance - chronically ill appearing  HEENT: Neck supple with midline trachea  Neck: no adenopathy and supple, symmetrical, trachea midline  Lungs: clear to auscultation bilaterally  Heart: regular rate and rhythm, S1, S2 normal, no murmur, click, rub or gallop  Abdomen: normal findings: bowel sounds normal, no masses palpable and symmetric and abnormal findings:  distended and obese  Extremities: edema 2+ BLE     Medications: Reviewed    Labs:  CBC:   Recent Labs     07/28/21 0455 07/29/21  0507 07/30/21  0553   WBC 6.9 7.9 5.4   HGB 7.5* 8.0* 7.4*   HCT 23.8* 25.6* 23.6*   MCV 85.1 85.7 85.3    265 219     BMP:   Recent Labs     07/28/21  0455 07/29/21  0507 07/30/21  0553   * 127* 132*   K 3.6 4.3 4.0    92* 100   CO2 17* 20* 19*   BUN 82* 95* 86*   CREATININE 4.6* 4.7* 3.9*     LIVER PROFILE:   Recent Labs     07/28/21 0455 07/29/21  0507 07/30/21  0553   * 171* 90*   * 236* 169*   PROT 5.7* 7.0 6.2*   BILITOT 1.8* 1.6* 1.6*   ALKPHOS 309* 385* 340*     PT/INR:   Recent Labs     07/28/21 0455 07/29/21  0932   INR 2.53* 1.58*     Attending Supervising [de-identified] Attestation Statement  The patient is a 60 y.o. female. I have performed a history and physical examination of the patient. I discussed the case with my physician assistant Benji Perez PA-C    I reviewed the patient's Past Medical History, Past Surgical History, Medications, and Allergies.      Physical Exam:  Vitals:    07/30/21 0059 07/30/21 0300 07/30/21 0800 07/30/21 1530   BP: 109/75  119/76 111/70   Pulse: 105  102 114   Resp: 18  20 18   Temp: 97.2 °F (36.2 °C)  97.2 °F (36.2 °C) 97.3 °F (36.3 °C)   TempSrc: Oral  Oral Oral   SpO2: 98%   100%   Weight: 263 lb 6 oz (119.5 kg) 261 lb 6.4 oz (118.6 kg)     Height:           Physical Examination: General appearance - chronically ill appearing  Mental status - alert, oriented to person, place, and time  Eyes - pupils equal and reactive, extraocular eye movements intact  Neck - supple, no significant adenopathy  Chest - clear to auscultation, no wheezes, rales or rhonchi, symmetric air entry  Heart - normal rate, regular rhythm, normal S1, S2, no murmurs, rubs, clicks or gallops  Abdomen - soft, nontender, nondistended, no masses or organomegaly  Extremities - no pedal edema noted          Impression:  60year old female with a history of DM, HTN, HLD, COPD, CKD, CAD, atrial fibrillation, GERD, CHF, pulmonary HTN admitted with sepsis and elevated LFTs. Elevated LFTs likely secondary to passive congestion vs sepsis. MRCP negative for obvious choledocholithiasis. Recommendation:  1. Continue supportive care  2. Monitor LFTs  3. Monitor and document output  4. Monitor and replenish electrolytes  5. Continue broad spectrum antibiotics  6. Miralax daily   7. Continue diet as tolerated  8. PT/OT  9. Nephrology following - plans for dialysis today  10. Cardiology following - consider repeat cardiac echo   11. Will follow      Gwendolyn Obrien  9:58 AM 7/30/2021                      60year old female with a history of DM, HTN, HLD, COPD, CKD, CAD, atrial fibrillation, GERD, CHF, pulmonary HTN admitted with sepsis and elevated LFTs. Elevated LFTs likely secondary to passive congestion vs sepsis. MRCP negative for obvious choledocholithiasis     Continue supportive care. Monitor LFTs. Dialysis today. PT/OT. Encourage nutrition. Will sign off. Call with questions.  Follow up in clinic upon discharge.       Ayana Cardona MD          ([phone]  Sheila Caro) [phone]

## 2021-07-30 NOTE — FLOWSHEET NOTE
07/29/21 2031   Vital Signs   Temp 96.7 °F (35.9 °C)   Temp Source Oral   Pulse 105   Heart Rate Source Monitor   Resp 18   /70   BP Location Right upper arm   Level of Consciousness Alert (0)   MEWS Score 2   Oxygen Therapy   SpO2 97 %   O2 Device None (Room air)     Pt in bed resting, able to wake up for moments when stimulated. A/O x 4. Pt repositioned in bed. Shift assessment and meds completed. Call light within reach, bed alarm on, will continue to monitor.  Joshua Gibbs RN

## 2021-07-30 NOTE — FLOWSHEET NOTE
07/30/21 1150 07/30/21 1456   Vital Signs   /68 (!) 118/56   Temp 97.4 °F (36.3 °C) 96.6 °F (35.9 °C)   Pulse 104 104   Weight 261 lb (118.4 kg) 257 lb 4.4 oz (116.7 kg)   Post-Hemodialysis Assessment   NET Removed (ml)  --  2100 ml     Treatment time: 180min    Net UF: 2100ml    Pre weight: 118.4k  Post weight: 116.7k  EDW: TBD    Access used: [facility] TDC  Access function: worked well    Medications or blood products given: None    Regular outpatient schedule: TBD    Summary of response to treatment: Pt tolerated well. Copy of dialysis treatment record placed in chart, to be scanned into EMR.

## 2021-07-30 NOTE — PROGRESS NOTES
Patient in bed with eyes closed, no s/s of discomfort or distress. Call light in reach. Bed alarm on.    Report and transfer of care to Merry Guerra.

## 2021-07-30 NOTE — PROGRESS NOTES
Progress Note    Admit Date:  7/26/2021    Subjective:  Ms. Yoel Benedict reports she is feeling better today. Slept well last night. Had [facility] [place], operated by [facility] placed yesterday. Plans for HD today     Objective:   Patient Vitals for the past 4 hrs:   BP Temp Temp src Pulse Resp   07/30/21 0800 119/76 97.2 °F (36.2 °C) Oral 102 20            Intake/Output Summary (Last 24 hours) at 7/30/2021 0956  Last data filed at 7/30/2021 0858  Gross per 24 hour   Intake 300 ml   Output 1375 ml   Net -1075 ml       Physical Exam:  Gen: Obese, chronically ill appearing female, No distress. Alert. Eyes: No conjunctival injection. ENT: No discharge. Pharynx clear. Neck:  Trachea midline. R TDC in place with c/d/i dressing, mild TTP, no crepitus   Resp: No accessory muscle use. No wheezes. No rhonchi. Diminished breath sounds with faint crackles   CV: Regular rate. Irregular rhythm. No murmur. No rub. +++ edema. Capillary Refill: Brisk,< 3 seconds   Peripheral Pulses: +2 palpable, equal bilaterally   GI: Firm, significant SQ edema, Non-tender. Non-distended. Unable to hear BS likely 2/2 body habitus and edema   Skin: Warm and dry. Chronic skin changes to BLEs, multiple areas of ecchymosis and superficial abrasions, R femoral CVC   M/S: + edema . Neuro: Awake. Grossly nonfocal, generalized weakness    Psych: Oriented x 3. No anxiety or agitation.      Scheduled Meds:   insulin lispro  0-18 Units Subcutaneous 4x Daily WC    phytonadione (VITAMIN K)  IVPB  10 mg Intravenous Once    atorvastatin  20 mg Oral Nightly    zolpidem  5 mg Oral Nightly    metoprolol tartrate  25 mg Oral BID    mupirocin   Nasal BID    miconazole   Topical BID    pantoprazole  40 mg Intravenous Daily    sodium chloride flush  5-40 mL Intravenous 2 times per day    vancomycin (VANCOCIN) intermittent dosing (placeholder)   Other RX Placeholder       Continuous Infusions:   dextrose      sodium chloride         PRN Meds:  vancomycin, midodrine, dextrose, dextrose, HYDROmorphone, sodium chloride flush, sodium chloride, ondansetron **OR** ondansetron, polyethylene glycol, acetaminophen **OR** acetaminophen, glucose, glucagon (rDNA)      Data:  CBC:   Recent Labs     07/28/21  0455 07/29/21  0507 07/30/21  0553   WBC 6.9 7.9 5.4   HGB 7.5* 8.0* 7.4*   HCT 23.8* 25.6* 23.6*   MCV 85.1 85.7 85.3    265 219     BMP:   Recent Labs     07/28/21  0455 07/29/21  0507 07/30/21  0553   * 127* 132*   K 3.6 4.3 4.0    92* 100   CO2 17* 20* 19*   BUN 82* 95* 86*   CREATININE 4.6* 4.7* 3.9*     LIVER PROFILE:   Recent Labs     07/28/21 0455 07/29/21  0507 07/30/21  0553   * 171* 90*   * 236* 169*   BILITOT 1.8* 1.6* 1.6*   ALKPHOS 309* 385* 340*     PT/INR:   Recent Labs     07/28/21  0455 07/29/21  0932   PROTIME 29.7* 18.2*   INR 2.53* 1.58*       CULTURES  Blood Cx: NGTD  COVID-19: not detected   Urine Cx: E. Faecium   Enterococcus faecium (1)    Antibiotic Interpretation MERARY Status    ampicillin Resistant >=32 mcg/mL     nitrofurantoin Intermediate 64 mcg/mL     tetracycline Resistant >=16 mcg/mL     vancomycin Sensitive 1 mcg/mL          RADIOLOGY  IR TUNNELED CVC PLACE WO SQ PORT/PUMP > 5 YEARS   Final Result   Successful ultrasound and fluoroscopy guided right internal jugular 23 cm tip   to cuff tunneled hemodialysis catheter placement. MRI ABDOMEN WO CONTRAST MRCP   Final Result   1. Status post cholecystectomy. Proximal common bile duct measures up to 12   mm. The mid to distal common bile duct are normal in course and caliber. Evaluation of the common bile duct is significantly limited by motion. No   obvious choledocholithiasis. 2. Anasarca with small bilateral effusions. US GALLBLADDER RUQ   Final Result   1. Patient status post cholecystectomy. 2. Mild nonspecific dilatation of the common bile duct, measuring 12.3 mm in   diameter, without demonstrable cause.   Given history of elevated LFTs,   consider further characterization with a dedicated MRCP study. 3. Unremarkable sonographic appearance of the liver, right kidney, and   pancreas. XR ABDOMEN (KUB) (SINGLE AP VIEW)   Final Result   Right femoral venous catheter projects in normal position. XR CHEST PORTABLE   Final Result   1. Stable cardiomegaly. 2. Low lung volumes. CT head without contrast   Final Result   No acute intracranial abnormality. Assessment/Plan:  Septic shock  - unclear source--> possibly 2/2 cellulitis in both lower extremities-> now with + Urine Cx   - Received 4 L of IV fluids in the emergency room. - Placed on Levophed. Now off pressors. - Blood Cx: NGTD, Urine Cx:Enterrococcus faecium-->sensitive to Vanc   - Continue IV Cefepime D#3 9not d/c'd), continue IV Vanc      UTI  - Urine Cx with E. Faecium-->senstive to Vanc   - Continue Vancomycin, pharmacy to dose     Acute metabolic encephalopathy--resolved  - 2/2 septic shock  - CT head WNL     Elevated LFTs  - No abdominal tenderness  - Denies alcohol  - Likely 2/2 severe sepsis and shock.    - Obtain right upper quad ultrasound-results reviewed. - Follow hepatic profile closely-->LFTs improving   - GI consult: MRCP: no obvious choledocholithiasis   - GI okayed to resume statin      SHAUNNA on CKD stage IV  -  Likely ATN in the setting of  septic shock  - Received 4 L of IV fluids-->Was on Levophed but now off.    - Nephrology consult: s/p tunneled dialysis catheter placement on 7/29, plans for HD today (7/30)  - Monitor renal function with HD, no permanent HD unit scheduled for now--CM aware of possible need and will continue to follow   - Having good UOP     AGMA  - 2/2 acute kidney injury  - Resolving.     Chronic sCHF  - Last EF 25% 8/2020.    - Hold torsemide and metoprolol given low blood pressure readings-->okay to resume BB now    - Not on ACE/ARB given chronic kidney disease  - Continue to hold diuretics      CAD s/p CABG, stents.     - Elevated troponin 2/2 increased demand and chronic kidney disease.    - Hold aspirin per cardiology for now. - Lipitor held secondary to elevated LFTs-->okayed by GI to resume      History of PAF  - Currently sinus tachycardia  - Eliquis held per cardiology-->okay to resume tomorrow      Type 2 diabetes  - Controlled  - Placed on sliding scale insulin  - BG is stable     COPD  - Stable  - Continue inhalers.     Acute on chronic anemia. - Anemia secondary to chronic kidney disease.  - Drop in H&H over the past 2 to 3 weeks. - Hemoccult stool. Hold antiplatelets and Eliquis for now-->okay to resume tomorrow      Hyponatremia  - On tolvaptan 3 days a week. - Nephrology following.     Hyperkalemia  - Received  Lokelma    HLD  - Continue statin       DVT Prophylaxis: resume Eliquis  Tomorrow   Diet: ADULT DIET; Regular; 4 carb choices (60 gm/meal)  Code Status: Full Code    HD today. If [facility] functioning well and + peripheral access, remove R femoral CVC. Resume Eliquis and Plavix. Will need to monitor renal function to determine possible need for permanent HD and OP unit. Patient has discussed with her daughter whom she wishes to be her HCPOA. She wishes to be full code at this time.      CHERIE Arrington 9:56 AM 7/30/2021

## 2021-07-30 NOTE — CARE COORDINATION
INTERDISCIPLINARY PLAN OF CARE CONFERENCE    Date/Time: 7/30/2021 3:42 PM  Completed by: Kareem Mccracken RN, Case Management      Patient Name:  Gilberto Langley  YOB: 1965  Admitting Diagnosis: Sepsis ([address] [A41.9]     Admit Date/Time:  7/26/2021 12:21 PM    Chart reviewed. Interdisciplinary team contacted or reviewed plan related to patient progress and discharge plans. Disciplines included Case Management, Nursing, and Dietitian. Current Status:ongoing  PT/OT recommendation for discharge plan of care: SNF    Expected D/C Disposition:  Skilled nursing facility  Confirmed plan with patient and/or family Yes confirmed with: (name) pt and attempted to call Gilberto Bearden (daughter)  Met with:pt and attempted to call Gilberto Bearden (daughter)    Discharge Plan Comments: Reviewed chart. Role of discharge planner explained and patient verbalized understanding. Pt states that CM can speak with daughter, Gilberto Bearden. CM attempted to call pt's daughter, Gilberto Bearden, to discuss, but her VM  Is full and CM is not able to leave a  ([phone]) . Pt is from [facility] and plans to return. Pt makes her own decisions. Cm spoke with Dr. Lyndsay Davison and he states he would like CM to find a permanent HD slot for pt. CM spoke with pt and she chose [facility]. Vijaytim Langley with [facility] is going to talk to the daughter to figure out about transportation for HD and then Vijay Langley will start precert. CM called [facility] [facility] with [facility] and she is looking at spots available for [facility] for pt and will let CM know. Per Dr. Lyndsay Davison, pt has a permanent vas cath. CM LM for Ran at [address] [facility] to let her know that CM is awaiting a HD slot for pt and to let her know pt will need a HD slot and transport for HD, once CM has the date, location,  and time of HD slot, will let Vijay Langley know so they can set up ambulance transport. Pt will need a rapid covid prior to d/c      Addendum 7/30/2021 1608: Gilberto Monisha (daughter) returned CM's call.  CM informed Gilberto Bearden that pt will need permanent HD. Cherelle verbalized understanding. [facility] states she will transport pt to and from HD. [facility] states she plans for pt to return back to [place] for SNF, as well. GRACIE informed Ran with [facility] of this,as well    Ran with [facility] starting precert today ([id number] on 7/30/2021). Awaiting to hear back from [facility] for time slot.      Home O2 in place on admit: No  Pt informed of need to bring portable home O2 tank on day of discharge for nursing to connect prior to leaving:  Not Indicated  Verbalized agreement/Understanding:  Not Indicated

## 2021-07-30 NOTE — PLAN OF CARE
Problem: Skin Integrity:  Goal: Will show no infection signs and symptoms  Description: Will show no infection signs and symptoms  Outcome: Ongoing  Goal: Absence of new skin breakdown  Description: Absence of new skin breakdown  Outcome: Ongoing     Problem: Pain:  Goal: Pain level will decrease  Description: Pain level will decrease  Outcome: Ongoing  Goal: Control of acute pain  Description: Control of acute pain  Outcome: Ongoing  Goal: Control of chronic pain  Description: Control of chronic pain  Outcome: Ongoing  Goal: Patient's pain/discomfort is manageable  Description: Patient's pain/discomfort is manageable  Outcome: Ongoing     Problem: Infection:  Goal: Will remain free from infection  Description: Will remain free from infection  Outcome: Ongoing     Problem: Safety:  Goal: Free from accidental physical injury  Description: Free from accidental physical injury  Outcome: Ongoing  Goal: Free from intentional harm  Description: Free from intentional harm  Outcome: Ongoing     Problem: Daily Care:  Goal: Daily care needs are met  Description: Daily care needs are met  Outcome: Ongoing     Problem: Skin Integrity:  Goal: Skin integrity will stabilize  Description: Skin integrity will stabilize  Outcome: Ongoing     Problem: Discharge Planning:  Goal: Patients continuum of care needs are met  Description: Patients continuum of care needs are met  Outcome: Ongoing

## 2021-07-30 NOTE — FLOWSHEET NOTE
07/30/21 0059   Vital Signs   Temp 97.2 °F (36.2 °C)   Temp Source Oral   Pulse 105   Resp 18   /75   BP Location Right upper arm   Level of Consciousness Alert (0)   MEWS Score 2   Oxygen Therapy   SpO2 98 %   Height and Weight   Weight 263 lb 6 oz (119.5 kg)   BMI (Calculated) 49.1     Pt asleep in bed, call light within reach, bed alarm on, will continue to  Monitor.  Marilee Lane RN

## 2021-07-31 NOTE — PROGRESS NOTES
[facility] Progress Note           Subjective/   60y.o. year old female who we are seeing in consultation for A/CKD-4, now ESRD, on   HD since 7/30. HPI:  Did ok with first dialysis  Very weak  Making some urine  Soreness over the TDC    ROS:  C/o shortness of breath, no fevers, +weak.     Objective/   GEN:  Chronically ill, BP 93/62   Pulse 103   Temp 97 °F (36.1 °C) (Oral)   Resp 16   Ht 5' 1.5\" (1.562 m)   Wt 262 lb 11.2 oz (119.2 kg)   LMP 10/24/2012   SpO2 97%   BMI 48.83 kg/m²   HEENT: non-icteric, no JVD  CV: S1, S2 without m/r/g; +++ LE edema  RESP: CTA B without w/r/r; breathing wnl  ABD: +bs, soft, nt, no hsm  SKIN: warm, no rashes  ACCESS: R IJ TDC (7/29)    Data/  Recent Labs     07/29/21  0507 07/30/21  0553 07/31/21  0445   WBC 7.9 5.4 6.0   HGB 8.0* 7.4* 7.3*   HCT 25.6* 23.6* 23.5*   MCV 85.7 85.3 85.7    219 220     Recent Labs     07/29/21  0507 07/30/21  0553 07/31/21  0445   * 132* 133*   K 4.3 4.0 4.4   CL 92* 100 101   CO2 20* 19* 21   GLUCOSE 210* 135* 202*   BUN 95* 86* 52*   CREATININE 4.7* 3.9* 2.7*   LABGLOM 10* 12* 18*   GFRAA 12* 14* 22*       Assessment/         - Acute kidney injury - ATN in setting of suspected septic shock     - Chronic Kidney Disease:  Stage IV - now ESRD             Etiology: Multiple episodes of SHAUNNA, background vascular disease             Highly variable SCr depending on cardiac and volume status but overall worsening              SCr low to mid 2 range at it best, last at 2.4 this month   Follows with Dr. Mickey Concepcion in our office     - Chronic Hypotension:  Impacts pressure natriuresis      - Anion-gap metabolic acidosis - in setting of elevated lactate & SHAUNNA     - Hyponatremia:  Hypervolemic / Due to CHF              On tolvaptan 3 days a week                 - Systolic Heart Failure:  EF = 25%                 - Hyperkalemia - better with medical treatment with lokelma     - Anemia - trend with UF, monitoring for BELKIS needs       Plan/     - Next HD on Monday  - Trend labs, bp's, urine output    Needs outpatient HD placement as ESRD  Case d/w Dr. Jim Lackey &  - looking to go to [address] [place] since she resides at the [facility] there    ____________________________________  Talita Navarro MD  [facility]  [email or web address]  Office: [phone]

## 2021-07-31 NOTE — FLOWSHEET NOTE
07/30/21 1906   Vital Signs   Temp 97.2 °F (36.2 °C)   Temp Source Axillary   Pulse 112   Heart Rate Source Monitor   Resp 20   BP (!) 115/59   BP Location Left upper arm   Patient Position Semi fowlers   Level of Consciousness Alert (0)   MEWS Score 3   Oxygen Therapy   SpO2 97 %   O2 Device None (Room air)     Pt asleep in bed, able to be woken up for meds. A/O x 4, pt struggling to shift self in bed. Meds and shift assessment completed. Holding Ambien at this time due to pt sleeping. Call light within reach, bed alarm on, will continue to monitor.  Matt Nurse, RN

## 2021-07-31 NOTE — PROGRESS NOTES
Progress Note    Admit Date:  7/26/2021    Patient has been initiated on hemodialysis. Alem Mckeon 83 placed on 7/29/2021   She had her first dialysis yesterday 7/30/2021    Subjective:  Ms. Shayla Plummer still complains of feeling very fatigued and tired . .    BP low. Antihypertensives held. Creatinine is down to 2.7  Next dialysis planned for Monday. Objective:   Patient Vitals for the past 4 hrs:   BP Temp Temp src Pulse Resp SpO2   07/31/21 0931 104/69 97 °F (36.1 °C) Oral 110 16 97 %            Intake/Output Summary (Last 24 hours) at 7/31/2021 1046  Last data filed at 7/31/2021 0931  Gross per 24 hour   Intake 580 ml   Output 3050 ml   Net -2470 ml       Physical Exam:  Gen: Obese, chronically ill appearing female, No distress. Alert. She appears chronically ill and very fatigued  Eyes: No conjunctival injection. ENT: No discharge. Pharynx clear. Neck:  Trachea midline. R TDC in place with c/d/i dressing, mild TTP, no crepitus   Resp: No accessory muscle use. No wheezes. No rhonchi. Diminished breath sounds with faint crackles   CV: Regular rate. Irregular rhythm. No murmur. No rub. +++ edema. Capillary Refill: Brisk,< 3 seconds   Peripheral Pulses: +2 palpable, equal bilaterally   GI: Firm, significant SQ edema, Non-tender. Non-distended. Unable to hear BS likely 2/2 body habitus and edema   Skin: Warm and dry. Chronic skin changes to BLEs, multiple areas of ecchymosis and superficial abrasions, R femoral CVC   M/S: + edema . Neuro: Awake. Grossly nonfocal, generalized weakness    Psych: Oriented x 3. No anxiety or agitation.      Scheduled Meds:   vancomycin  500 mg Intravenous Once    insulin lispro  0-18 Units Subcutaneous 4x Daily WC    polyethylene glycol  17 g Oral Daily    torsemide  50 mg Oral Daily    apixaban  5 mg Oral BID    clopidogrel  75 mg Oral Daily    phytonadione (VITAMIN K)  IVPB  10 mg Intravenous Once    atorvastatin  20 mg Oral Nightly    zolpidem  5 mg Oral Nightly    metoprolol tartrate  25 mg Oral BID    mupirocin   Nasal BID    miconazole   Topical BID    pantoprazole  40 mg Intravenous Daily    sodium chloride flush  5-40 mL Intravenous 2 times per day    vancomycin (VANCOCIN) intermittent dosing (placeholder)   Other RX Placeholder       Continuous Infusions:   dextrose      sodium chloride         PRN Meds:  heparin (porcine), midodrine, dextrose, dextrose, HYDROmorphone, sodium chloride flush, sodium chloride, ondansetron **OR** ondansetron, acetaminophen **OR** acetaminophen, glucose, glucagon (rDNA)      Data:  CBC:   Recent Labs     07/29/21  0507 07/30/21  0553 07/31/21  0445   WBC 7.9 5.4 6.0   HGB 8.0* 7.4* 7.3*   HCT 25.6* 23.6* 23.5*   MCV 85.7 85.3 85.7    219 220     BMP:   Recent Labs     07/29/21  0507 07/30/21  0553 07/31/21  0445   * 132* 133*   K 4.3 4.0 4.4   CL 92* 100 101   CO2 20* 19* 21   BUN 95* 86* 52*   CREATININE 4.7* 3.9* 2.7*     LIVER PROFILE:   Recent Labs     07/29/21  0507 07/30/21  0553 07/31/21  0445   * 90* 63*   * 169* 130*   BILITOT 1.6* 1.6* 1.5*   ALKPHOS 385* 340* 363*     PT/INR:   Recent Labs     07/29/21  0932   PROTIME 18.2*   INR 1.58*       CULTURES  Blood Cx: NGTD  COVID-19: not detected   Urine Cx: E. Faecium   Enterococcus faecium (1)    Antibiotic Interpretation MERARY Status    ampicillin Resistant >=32 mcg/mL     nitrofurantoin Intermediate 64 mcg/mL     tetracycline Resistant >=16 mcg/mL     vancomycin Sensitive 1 mcg/mL          RADIOLOGY  IR TUNNELED CVC PLACE WO SQ PORT/PUMP > 5 YEARS   Final Result   Successful ultrasound and fluoroscopy guided right internal jugular 23 cm tip   to cuff tunneled hemodialysis catheter placement. MRI ABDOMEN WO CONTRAST MRCP   Final Result   1. Status post cholecystectomy. Proximal common bile duct measures up to 12   mm. The mid to distal common bile duct are normal in course and caliber.    Evaluation of the common bile duct is significantly limited by motion. No   obvious choledocholithiasis. 2. Anasarca with small bilateral effusions. US GALLBLADDER RUQ   Final Result   1. Patient status post cholecystectomy. 2. Mild nonspecific dilatation of the common bile duct, measuring 12.3 mm in   diameter, without demonstrable cause. Given history of elevated LFTs,   consider further characterization with a dedicated MRCP study. 3. Unremarkable sonographic appearance of the liver, right kidney, and   pancreas. XR ABDOMEN (KUB) (SINGLE AP VIEW)   Final Result   Right femoral venous catheter projects in normal position. XR CHEST PORTABLE   Final Result   1. Stable cardiomegaly. 2. Low lung volumes. CT head without contrast   Final Result   No acute intracranial abnormality. Assessment/Plan:  Septic shock  - unclear source--> possibly 2/2 cellulitis in both lower extremities-> now with + Urine Cx   - Received 4 L of IV fluids in the emergency room. - Placed on Levophed. Now off pressors. - Blood Cx: NGTD, Urine Cx:Enterrococcus faecium-->sensitive to Vanc   -Continue IV Vanco , dosed for dialysis . continue IV Cefepime D#4. Likely stop in a.m. .        UTI  - Urine Cx with E. Faecium-->senstive to Vanc   - Continue Vancomycin, pharmacy to dose     Acute metabolic encephalopathy--resolved  - 2/2 septic shock  - CT head WNL     Elevated LFTs  - No abdominal tenderness  - Denies alcohol  - Likely 2/2 severe sepsis and shock.    - Obtain right upper quad ultrasound-results reviewed.     - Follow hepatic profile closely-->LFTs improving   - GI consult: MRCP: no obvious choledocholithiasis   - GI okayed to resume statin      SHAUNNA on CKD stage IV  -  Likely ATN in the setting of  septic shock  - Received 4 L of IV fluids-->Was on Levophed but now off.    - Nephrology consult: s/p tunneled dialysis catheter placement on 7/29, plans for HD today (7/30)  - Monitor renal function with HD, no permanent HD unit scheduled

## 2021-07-31 NOTE — PROGRESS NOTES
OOB to [facility] then bedside chair with FW rolling walker and assist x2.   She tolerated the activity well; up in chair for lunch meal.

## 2021-07-31 NOTE — PROGRESS NOTES
Physical Therapy    Inpatient Physical Therapy Daily Treatment Note    Unit: PCU  Date:  7/31/2021  Patient Name:    Kim Bautista  Admitting diagnosis:  Sepsis [facility]) [A41.9]  Admit Date:  7/26/2021  Precautions/Restrictions:  Fall risk, Bed/chair alarm, Lines -IV and Lees catheter, Craig (hard of hearing) and Telemetry      Discharge Recommendations: SNF  DME needs for discharge: defer to facility       Therapy recommendation for EMS Transport: requires transport by cot due to requires 2 person assist for transfers    Therapy recommendations for staff:   Assist of 2 with use of rolling walker (RW) for all transfers to/from chair    History of Present Illness: Per H&P Frank Moon 927/21 40-year-old  female who is a poor historian right now, in and out of alertness, presenting to hospital with what appears to be altered mentation since the last couple of days, progressive at the nursing facility, who was apparently picking at her skin wounds, was found to be hypotensive and confused and hence unable to provide a very good history without nausea, vomiting, fevers, or chills.  Per the documentation in the EMR, the patient was just staring at the ceiling and hence much history could not be obtained by the EMS. PAST MEDICAL HISTORY:  1.  Chronic kidney disease. 2.  Chronic combined systolic and diastolic congestive heart failure. 3.  Atrial fibrillation. 4.  Coronary artery disease with cardiomyopathy. 5.  Morbid obesity with a BMI of 43.5 kg/m2. 6.  Type 2 diabetes. 7.  Chronic bilateral lower extremity wounds.     Home Health S4 Level Recommendation: NA  AM-PAC Mobility Score   AM-PAC Inpatient Mobility Raw Score : 12       Treatment Time:  12:55 - 13:26  Treatment number: 3  Timed Code Treatment Minutes: 31 minutes  Total Treatment Minutes:  31  minutes    Cognition    A&O x4   Able to follow 2 step commands    Subjective  Patient lying supine in bed with friend present   Pt agreeable to this PT tx.   Pt requests to get to chair    Pain   Yes  Location: chronic LBP  Rating:    moderate/10  Pain Medicine Status: No request made     Bed Mobility   Supine to Sit:    Not Tested  Sit to Supine: Mod A   Rolling:   Min A  to R and L  Scooting: Mod A  and 2 persons to scoot to [facility]    Transfer Training     Sit to stand: Mod A x3 trials  Stand to sit:   Min A   Bed to Chair:   Min A  with use of gait belt and rolling walker (RW)   Comment:     Gait Training gait completed as indicated below  Distance:      10 ft + 6 ft   Deviations (firm surface/linoleum):  decreased tamir, increased GABINO and shuffles  Assistive Device Used:    gait belt and rolling walker (RW)  Level of Assist:    Min A   Comment: required seated rest break between bouts 2/2 fatigue    Stair Training deferred, pt unsafe/not appropriate to complete stairs at this time      Therapeutic Exercise all completed bilaterally unless indicated  Ankle Pumps x 10 reps  LAQ x 10 reps  marching x 10 reps    Balance  Sitting:  Good - ; SBA  Comments:     Standing: Fair +; Min A   Comments[place] [facility]    [place] Education      Role of PT, POC, Discharge recommendations, safety awareness, transfer techniques, pursed lip breathing, pacing activity, HEP and calling for assist with mobility. Positioning Needs       Pt in bed, alarm set, positioned in proper neutral alignment and pressure relief provided. Call light provided and all needs within reach     Activity Tolerance   Pt completed therapy session with SOB noted with gait. Following gait:  HR: 109bpm  SpO2: 95%    Assessment :  Patient with good motivation for ambulation. Fatigued easily but demonstrated inc gait distances with less assistance needed for transfers. Continue to progress gait and overall mobility as pt tolerates. Cotx no longer needed.     Recommending SNF upon discharge as patient functioning well below baseline, demonstrates good rehab potential and unable to return home due to limited or no family support, inability to negotiate stairs to enter home/bedroom/bathroom, burden of care beyond caregiver ability, home environment not conducive to patient recovery and limited safety awareness. Goals (all goals ongoing unless otherwise indicated)  To be met in 3 visits:  1). Independent with LE Ex x 10 reps     To be met in 6 visits:  1). Supine to/from sit: Mod A   2). Sit to/from stand: Mod A   3). Bed to chair: Mod A   4). Gait: Ambulate 50 ft.  with  Mod A  and use of LRAD (least restrictive assistive device)  5). Tolerate B LE exercises 3 sets of 10-15 reps  6). Ascend/descend 5 steps with Min A  with use of hand rail bilateral and LRAD (least restrictive assistive device)    Plan   Continue with plan of care. Signature: Hill Broussard, PT, DPT, OMT-C  #[id number]      If patient discharges from this facility prior to next visit, this note will serve as the Discharge Summary.

## 2021-07-31 NOTE — PROGRESS NOTES
Patient's EF (Ejection Fraction) is less than 40%    Patient's weights and intake/output reviewed:    Patient's Last Weight: 262 lbs obtained by bed scale. Difference of 5 lbs less than last documented weight. Intake/Output Summary (Last 24 hours) at 7/31/2021 1649  Last data filed at 7/31/2021 1400  Gross per 24 hour   Intake 360 ml   Output 700 ml   Net -340 ml         Pt is currently not using supplemental oxygen. . Pt denies shortness of breath. Pt with pitting lower extremity edema.      Patient and/or Family's stated Goal of Care this Admission: reduce shortness of breath, increase activity tolerance, better understand heart failure and disease management, be more comfortable, reduce lower extremity edema and unable to assess, will attempt again prior to discharge      Comorbidities Reviewed Yes  Patient has a past medical history of Acute blood loss anemia, Acute kidney injury ([address] Acute kidney injury superimposed on CKD ([address] Acute on chronic combined systolic and diastolic congestive heart failure ([address] Acute on chronic right-sided heart failure ([address] Alcohol dependence ([address] Angina pectoris ([address] Arthritis, Asthma, Atrial fibrillation ([address] CAD (coronary artery disease), Cardiomyopathy ([address] Cellulitis, Charcot's joint of ankle, left, Chronic kidney disease, COPD (chronic obstructive pulmonary disease) ([address] Diabetic ulcer of left foot associated with type 2 diabetes mellitus, limited to breakdown of skin ([address] Diabetic ulcer of toe of right foot associated with type 2 diabetes mellitus ([address] Enthesopathy, GERD (gastroesophageal reflux disease), Hyperlipidemia, Hypertension, Left renal artery stenosis ([address] MI (myocardial infarction) ([address] Morbid obesity ([address] Osteoarthritis, Peripheral neuropathy, Peripheral vascular disease ([address] Polyarthritis, Positive FIT (fecal immunochemical test), 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          >>For CHF and Comorbidity documentation on Education Time and Topics, please see Education Tab

## 2021-07-31 NOTE — FLOWSHEET NOTE
07/31/21 0211   Vital Signs   Temp 98.9 °F (37.2 °C)   Temp Source Axillary   Pulse 113   Heart Rate Source Monitor   Resp 18   /68   BP Location Left upper arm   Patient Position Semi fowlers   Level of Consciousness Alert (0)   MEWS Score 4   Oxygen Therapy   SpO2 98 %   O2 Device None (Room air)   Height and Weight   Weight 262 lb 11.2 oz (119.2 kg)   Weight Method Bed scale   BMI (Calculated) 48.9     Pt. Is resting in bed, call light within reach, bed alarm on, will continue to monitor.  Vijay Hannah, RN

## 2021-07-31 NOTE — PROGRESS NOTES
Inpatient Occupational Therapy Treatment    Unit: ICU  Date:  2021  Patient Name:    Yanni Bautista  Admitting diagnosis:  Sepsis [facility]) [A41.9]  Admit Date:  2021  Precautions/Restrictions/WB Status/ Lines/ Wounds/ Oxygen: fall risk, IV, bed/chair alarm and roche catheter     Treatment Time:  1624-1601  Treatment Number: 3    Billable Treatment Time: 27 minutes   Total Treatment Time:   27   minutes    Patient Goals for Therapy:  \" walk around the bed \"      Discharge Recommendations: SNF  DME needs for discharge: defer to facility       Therapy recommendations for staff:   Assist of 1 with use of rolling walker (RW) for all transfers to/from BSC/chair    History of Present Illness: Per H&P Frank Moon 927/  58-year-old   female who is a poor historian right now, in and out of alertness,  presenting to hospital with what appears to be altered mentation since  the last couple of days, progressive at the nursing facility, who was  apparently picking at her skin wounds, was found to be hypotensive and  confused and hence unable to provide a very good history without nausea,  vomiting, fevers, or chills. Per the documentation in the EMR, the  patient was just staring at the ceiling and hence much history could not  be obtained by the EMS.    PAST MEDICAL HISTORY:  1. Chronic kidney disease. 2.  Chronic combined systolic and diastolic congestive heart failure. 3.  Atrial fibrillation. 4.  Coronary artery disease with cardiomyopathy. 5.  Morbid obesity with a BMI of 43.5 kg/m2. 6.  Type 2 diabetes.   7.  Chronic bilateral lower extremity wounds.     Home Health S4 Level Recommendation:  NA  AM-PAC Score: AM-PAC Inpatient Daily Activity Raw Score: 13    Pain  Yes  Ratin-9  Location:chronic back pain  Pain Medicine Status: No request made      Cognition    A&O Person, Place and Time   Able to follow 2 step commands    Subjective  Pt sitting up in chair and agreeable to therapy to walk around bed to chair    Balance  Functional Sitting Balance:  Impaired SBA - pt anxious about falling backwards   Functional Standing Balance:Impaired Min A x1with RW - stood approx 1mins @ RW w/ cues for breathing management     Bed mobility:    Supine to sit:   Not Tested  Sit to supine: Mod A  Rolling:    Min A  Scooting in sitting:  Min A  Scooting to head of bed: Mod A of 2    Bridging:   Not Tested    Transfers:    Sit to stand: Mod A  3 trials  Stand to sit:  Min A  Bed to chair:   Min A with RW  Standard toilet: Not Tested  Bed to [facility]:  Not Tested   Functional mobility 2x with RW with Min A, needing rest break with SOB    Dressing:      UE:   Not Tested  LE:    Not Tested    Bathing:    UE:  Not Tested  LE:  Not Tested    Eating:   Not Tested    Toileting:  Not Tested    Activity Tolerance   Pt completed therapy session with Dizziness noted with standing   Following gait:  HR: 109bpm  SpO2: 95%      Positioning Needs:   Pt in bed, alarm set, positioned in proper neutral alignment and pressure relief provided. Call light provided and all needs within reach     Exercise / Activities Initiated:   N/A  Hand flex/ext:  x10  Shoulder flex/ext:  x10  Shoulder shrugs:  x10    Patient/Family Education:   Role of OT  Recommendations for DC  Safe RW use/hand placement    Assessment :  Patient improved endurance and strength this session able to perform sit to stand with mod A x1, and perform short distance functional mobility with RW with Min A. Recommend continued acute OT and therapy at SNF to increase functional independence. Goal(s) : To be met in 3 Visits:  1). Bed to toilet/BSC: Min A    To be met in 5 Visits:  1). Supine to/from Sit:  Mod A, goal met 7/31, increase to Min A  2). Upper Body Bathing:   SBA  3). Lower Body Bathing: Mod A  4). Upper Body Dressing:  SBA  5). Lower Body Dressing: Mod A  6).  Pt to demonstrate UE exs x 15 reps with minimal cues    Plan:  Continue POC     Delores Vigil

## 2021-07-31 NOTE — PLAN OF CARE
Problem: HEMODYNAMIC STATUS  Goal: Patient has stable vital signs and fluid balance  Outcome: Ongoing  Intervention: MONITOR PATIENT'S WEIGHT  Note: 262 lbs 11.2 oz; down 5 lbs today     Problem: OXYGENATION/RESPIRATORY FUNCTION  Goal: Patient will achieve/maintain normal respiratory rate/effort  Description: Respiratory rate and effort will be within normal limits for the patient  Intervention: ASSESS OXYGENATION AS ORDERED  Note: Patient is not using any supplemental O2 and denies any shortness of breath at rest or with exertion.

## 2021-07-31 NOTE — PROGRESS NOTES
Vancomycin Day 6    Random level today= 11.1 mg/L  Patient had HD yesterday. Next HD scheduled for Monday 8/2. Will order Vancomycin 500 mg x 1 dose today at 1300 and order random level on 8/2 with AM labs.   Adonay Heath [facility]

## 2021-08-01 NOTE — PROGRESS NOTES
[facility] Progress Note           Subjective/   60y.o. year old female who we are seeing in consultation for A/CKD-4, now ESRD, on   HD since 7/30. HPI:  Did ok with first dialysis  Very weak  Making some urine  Soreness over the [facility] and asking for pain medications     ROS:  C/o shortness of breath, no fevers, +weak.     Objective/   GEN:  Chronically ill, BP 99/66   Pulse 108   Temp 96.8 °F (36 °C) (Oral)   Resp 16   Ht 5' 1.5\" (1.562 m)   Wt 258 lb 6.4 oz (117.2 kg)   LMP 10/24/2012   SpO2 98%   BMI 48.03 kg/m²   HEENT: non-icteric, no JVD  CV: S1, S2 without m/r/g; +++ LE edema  RESP: CTA B without w/r/r; breathing wnl  ABD: +bs, soft, nt, no hsm  SKIN: warm, no rashes  ACCESS: R IJ TDC (7/29)    Data/  Recent Labs     07/30/21  0553 07/31/21  0445 08/01/21  0426   WBC 5.4 6.0 6.2   HGB 7.4* 7.3* 7.7*   HCT 23.6* 23.5* 24.5*   MCV 85.3 85.7 84.4    220 226     Recent Labs     07/30/21  0553 07/31/21  0445 08/01/21  0426   * 133* 127*   K 4.0 4.4 4.4    101 92*   CO2 19* 21 21   GLUCOSE 135* 202* 139*   BUN 86* 52* 67*   CREATININE 3.9* 2.7* 3.4*   LABGLOM 12* 18* 14*   GFRAA 14* 22* 17*       Assessment/         - Acute kidney injury - ATN in setting of suspected septic shock     - Chronic Kidney Disease:  Stage IV - now ESRD             Etiology: Multiple episodes of SHAUNNA, background vascular disease             Highly variable SCr depending on cardiac and volume status but overall worsening              SCr low to mid 2 range at it best, last at 2.4 this month   Follows with Dr. Mart Desir in our office     - Chronic Hypotension:  Impacts pressure natriuresis      - Anion-gap metabolic acidosis - in setting of elevated lactate & SHAUNNA     - Hyponatremia:  Hypervolemic / Due to CHF              On tolvaptan 3 days a week                 - Systolic Heart Failure:  EF = 25%                 - Hyperkalemia - better with medical treatment with lokelma     - Anemia - trend with UF, monitoring for BELKIS needs       Plan/     - Next HD on Monday  - Trend labs, bp's, urine output  - Increase midodrine to 10 mg TID  - Torsemide/metolazone  - Ok for BP in the high 80s and low 90s   - Change Eliquis to 2.5 mg po BID for ESRD dosing     Needs outpatient HD placement as ESRD  Case d/w Dr. Margo Mancia &  by Dr. Chriss Najjar - looking to go to [facility] since she resides at the [facility] there    ____________________________________  Abundio Redmond MD  [facility] [address]  Office: [phone]

## 2021-08-01 NOTE — PROGRESS NOTES
Pt repositioned for comfort. Sips of water given per patient request.  No other needs. Call light within reach. Bed exit alarm on.

## 2021-08-01 NOTE — FLOWSHEET NOTE
08/01/21 0820   Vital Signs   Temp 96.8 °F (36 °C)   Temp Source Oral   Pulse 108   Resp 16   BP 99/66   SpO2 98 %       Patient given 5mg Midodine

## 2021-08-01 NOTE — PROGRESS NOTES
Patient's EF (Ejection Fraction) is less than 40%    Patient's weights and intake/output reviewed:    Patient's Last Weight: 258 lbs obtained by bed scale. Intake/Output Summary (Last 24 hours) at 8/1/2021 1936  Last data filed at 8/1/2021 1742  Gross per 24 hour   Intake 240 ml   Output 750 ml   Net -510 ml         Pt is currently not using supplemental O2. Pt denies shortness of breath. Pt with pitting lower extremity edema. Patient and/or Family's stated Goal of Care this Admission: reduce shortness of breath, increase activity tolerance, better understand heart failure and disease management, be more comfortable, reduce lower extremity edema and unable to assess, will attempt again prior to discharge      Comorbidities Reviewed Yes  Patient has a past medical history of Acute blood loss anemia, Acute kidney injury ([address] Acute kidney injury superimposed on CKD ([address] Acute on chronic combined systolic and diastolic congestive heart failure ([address] Acute on chronic right-sided heart failure ([address] Alcohol dependence ([address] Angina pectoris ([address] Arthritis, Asthma, Atrial fibrillation ([address] CAD (coronary artery disease), Cardiomyopathy ([address] Cellulitis, Charcot's joint of ankle, left, Chronic kidney disease, COPD (chronic obstructive pulmonary disease) ([address] Diabetic ulcer of left foot associated with type 2 diabetes mellitus, limited to breakdown of skin ([address] Diabetic ulcer of toe of right foot associated with type 2 diabetes mellitus ([address] Enthesopathy, GERD (gastroesophageal reflux disease), Hyperlipidemia, Hypertension, Left renal artery stenosis ([address] MI (myocardial infarction) ([address] Morbid obesity ([address] Osteoarthritis, Peripheral neuropathy, Peripheral vascular disease ([address] Polyarthritis, Positive FIT (fecal immunochemical test), 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          >>For CHF and Comorbidity documentation on Education Time and Topics, please see Education Tab

## 2021-08-01 NOTE — PROGRESS NOTES
Progress Note    Admit Date:  7/26/2021    Patient has been initiated on hemodialysis. Alem Ramírez placed on 7/29/2021   She had her first dialysis 7/30/2021    Subjective:  Ms. Jorge Camp reports she is still very tired. Still very swollen and feels \"tight\"       Objective:   Patient Vitals for the past 4 hrs:   BP Temp Temp src Pulse Resp SpO2   08/01/21 0820 99/66 96.8 °F (36 °C) Oral 108 16 98 %       Intake/Output Summary (Last 24 hours) at 8/1/2021 7277  Last data filed at 8/1/2021 0908  Gross per 24 hour   Intake 646.15 ml   Output 450 ml   Net 196.15 ml       Physical Exam:   Gen: Obese, chronically ill appearing female, No distress. Alert. She appears chronically ill and very fatigued  Eyes: No conjunctival injection. ENT: No discharge. Pharynx clear. Neck:  Trachea midline. R TDC in place with c/d/i dressing, mild TTP, no crepitus   Resp: No accessory muscle use. No wheezes. No rhonchi. Diminished breath sounds with faint crackles   CV: Regular rate. Irregular rhythm. No murmur. No rub. +++ edema. Capillary Refill: Brisk,< 3 seconds   Peripheral Pulses: +2 palpable, equal bilaterally   GI: Firm, significant SQ edema, Non-tender. Non-distended. Unable to hear BS likely 2/2 body habitus and edema   Skin: Warm and dry. Chronic skin changes to BLEs, multiple areas of ecchymosis and superficial abrasions, R femoral CVC   M/S: + edema . Neuro: Awake. Grossly nonfocal, generalized weakness    Psych: Oriented x 3. No anxiety or agitation.      Scheduled Meds:   torsemide  100 mg Oral Daily    metOLazone  10 mg Oral Once    apixaban  2.5 mg Oral BID    insulin lispro  0-18 Units Subcutaneous 4x Daily WC    polyethylene glycol  17 g Oral Daily    clopidogrel  75 mg Oral Daily    phytonadione (VITAMIN K)  IVPB  10 mg Intravenous Once    atorvastatin  20 mg Oral Nightly    zolpidem  5 mg Oral Nightly    metoprolol tartrate  25 mg Oral BID    miconazole   Topical BID    pantoprazole  40 mg Intravenous Daily  sodium chloride flush  5-40 mL Intravenous 2 times per day    vancomycin (VANCOCIN) intermittent dosing (placeholder)   Other RX Placeholder       Continuous Infusions:   dextrose      sodium chloride Stopped (07/31/21 1531)       PRN Meds:  HYDROcodone 5 mg - acetaminophen, heparin (porcine), midodrine, dextrose, dextrose, sodium chloride flush, sodium chloride, ondansetron **OR** ondansetron, acetaminophen **OR** acetaminophen, glucose, glucagon (rDNA)      Data:  CBC:   Recent Labs     07/30/21  0553 07/31/21 0445 08/01/21  0426   WBC 5.4 6.0 6.2   HGB 7.4* 7.3* 7.7*   HCT 23.6* 23.5* 24.5*   MCV 85.3 85.7 84.4    220 226     BMP:   Recent Labs     07/30/21  0553 07/31/21 0445 08/01/21  0426   * 133* 127*   K 4.0 4.4 4.4    101 92*   CO2 19* 21 21   BUN 86* 52* 67*   CREATININE 3.9* 2.7* 3.4*     LIVER PROFILE:   Recent Labs     07/30/21  0553 07/31/21 0445 08/01/21  0426   AST 90* 63* 53*   * 130* 112*   BILITOT 1.6* 1.5* 1.8*   ALKPHOS 340* 363* 384*     PT/INR:   No results for input(s): PROTIME, INR in the last 72 hours. CULTURES  Blood Cx: NGTD  COVID-19: not detected   Urine Cx: E. Faecium   Enterococcus faecium (1)    Antibiotic Interpretation MERARY Status    ampicillin Resistant >=32 mcg/mL     nitrofurantoin Intermediate 64 mcg/mL     tetracycline Resistant >=16 mcg/mL     vancomycin Sensitive 1 mcg/mL          RADIOLOGY  IR TUNNELED CVC PLACE WO SQ PORT/PUMP > 5 YEARS   Final Result   Successful ultrasound and fluoroscopy guided right internal jugular 23 cm tip   to cuff tunneled hemodialysis catheter placement. MRI ABDOMEN WO CONTRAST MRCP   Final Result   1. Status post cholecystectomy. Proximal common bile duct measures up to 12   mm. The mid to distal common bile duct are normal in course and caliber. Evaluation of the common bile duct is significantly limited by motion. No   obvious choledocholithiasis. 2. Anasarca with small bilateral effusions. US GALLBLADDER RUQ   Final Result   1. Patient status post cholecystectomy. 2. Mild nonspecific dilatation of the common bile duct, measuring 12.3 mm in   diameter, without demonstrable cause. Given history of elevated LFTs,   consider further characterization with a dedicated MRCP study. 3. Unremarkable sonographic appearance of the liver, right kidney, and   pancreas. XR ABDOMEN (KUB) (SINGLE AP VIEW)   Final Result   Right femoral venous catheter projects in normal position. XR CHEST PORTABLE   Final Result   1. Stable cardiomegaly. 2. Low lung volumes. CT head without contrast   Final Result   No acute intracranial abnormality. Assessment/Plan:  Septic shock  - unclear source--> possibly 2/2 cellulitis in both lower extremities-> now with + Urine Cx   - Received 4 L of IV fluids in the emergency room. - Placed on Levophed. Now off pressors. - Blood Cx: NGTD, Urine Cx:Enterrococcus faecium-->sensitive to Vanc   -Continue IV Vanco , dosed for dialysis . Given IV Cefepime D#4-->now stopped     UTI  - Urine Cx with E. Faecium-->senstive to Vanc   - Continue Vancomycin, pharmacy to dose     Acute metabolic encephalopathy--resolved  - 2/2 septic shock  - CT head WNL     Elevated LFTs  - No abdominal tenderness  - Denies alcohol  - Likely 2/2 severe sepsis and shock.    - Obtain right upper quad ultrasound-results reviewed.     - Follow hepatic profile closely-->LFTs improving   - GI consult: MRCP: no obvious choledocholithiasis   - GI okayed to resume statin, LFTS stable with statin resumed      SHAUNNA on CKD stage IV  -  Likely ATN in the setting of  septic shock  - Received 4 L of IV fluids-->Was on Levophed but now off.    - Nephrology consult: s/p tunneled dialysis catheter placement on 7/29, plans for HD today (7/30)  - Monitor renal function with HD, no permanent HD unit scheduled for now--CM aware of possible need and will continue to follow   - Having good UOP  -Has dialysis on 7/30/2021 . Next dialysis scheduled for Monday . AGMA  - 2/2 acute kidney injury  - Resolving.     Chronic sCHF  - Last EF 25% 8/2020.    - Hold torsemide and metoprolol given low blood pressure readings-->okay to resume BB now    - Not on ACE/ARB given chronic kidney disease  - Continue to hold diuretics-->resuming diuretics (8/1)      CAD s/p CABG, stents. - Elevated troponin 2/2 increased demand and chronic kidney disease.    - Hold aspirin per cardiology for now. - Lipitor held secondary to elevated LFTs-->okayed by GI to resume      History of PAF  - Currently sinus tachycardia  - Eliquis held per cardiology-->okay to resume tomorrow      Type 2 diabetes  - Controlled  - Placed on sliding scale insulin  - BG is stable     COPD  - Stable  - Continue inhalers.     Acute on chronic anemia. - Anemia secondary to chronic kidney disease.  - Drop in H&H over the past 2 to 3 weeks. - Hemoccult stool. Hold antiplatelets and Eliquis for now-->okay to resume     Hyponatremia  - On tolvaptan 3 days a week. - Nephrology following.     Hyperkalemia  - Received  Lokelma    HLD  - Continue statin       Hypotension   - BP remaining low normal   - home BB intermittently held, would decrease dosing   - Monitor with resumption of diuretics   - Does have PRN Midodrine when needed    DVT Prophylaxis: resume Eliquis  Diet: ADULT DIET; Regular; 4 carb choices (60 gm/meal)  Code Status: Full Code    HD tomorrow, resuming home diuretics today. Femoral line in place with inability to place peripheral line. Eliquis and Plavix resumed. Decreased Lopressor from 25 mg BID to 12.5 mg BID. Less UOP than before.  Weight has remained stable     Maico Rodriguez [place] 9:39 AM 8/1/2021

## 2021-08-01 NOTE — PROGRESS NOTES
Vancomycin Day: 7    Patient's labs, cultures, vitals, and vancomycin regimen reviewed. No changes today. Next HD Monday 8/2. Pharmacy will continue to monitor.        Nelly Goodrich, Natalee, [facility], 8/1/2021 1:56 PM

## 2021-08-01 NOTE — FLOWSHEET NOTE
07/31/21 1932   Vital Signs   Temp 97 °F (36.1 °C)   Temp Source Axillary   Pulse 112   Heart Rate Source Monitor   Resp 18   /70   BP Location Left upper arm   Patient Position Sitting   Level of Consciousness Alert (0)   MEWS Score 3   Oxygen Therapy   SpO2 97 %   O2 Device None (Room air)   Pt assessment complete. Pt sitting up on side of bed. Lung sounds diminished. Lees cath in place draining dark yellow urine. Nightly medications given. Pt assisted to lie down. Repositioned for comfort. Denies any further needs at this time. Call light within reach. Bed exit alarm on.

## 2021-08-02 NOTE — PROGRESS NOTES
Pt states pain 3/10 on scale, pt requested something for pain. Tylenol was given, will reassess in one hour.  Graeme Carlson RN

## 2021-08-02 NOTE — PROGRESS NOTES
Wound care completed to bilateral lower extremities (vaseline gauze and tubi ). BLLE with increased swelling, redness, and open areas to BLLE now weeping. OT in room to work with patient at this time, patient remains at side of bed. Updated patient's friend per patient request at bedside on plan of care and pending dialysis today. Concerns expressed on patient's decreased appetite, addressed by Dr. Merna Chatman at bedside at this time. Call light in reach and bed alarm on. Will continue to monitor.

## 2021-08-02 NOTE — PROGRESS NOTES
Shift assessment completed. Patient awake, alert and oriented x4 in bed. VSS, NSR on telemetry, on RA. Lung fields clear/diminished throughout. BS audible, abdomen distended (last BM 8/1/21). Lees catheter in place, stat lock placed. Emptied 700 ml of yellow clear urine. Repositioned patient for comfort utilizing pillow support, with BLLE elevated on pillows. Call light in reach. Bed alarm on. Will continue to monitor.

## 2021-08-02 NOTE — DISCHARGE INSTR - COC
Continuity of Care Form    Patient Name: Gilberto Langley   :  1965  MRN:  [id number]    Admit date:  2021  Discharge date:  8/3/2021    Code Status Order: Full Code   Advance Directives:      Admitting Physician:  Mae Gallego MD  PCP: Chrissy Mcclellan PA-C    Discharging Nurse: Anise Spurling, [facility] Unit/Room#: [address] Phone Number: [phone]    Emergency Contact:   Extended Emergency Contact Information  Primary Emergency Contact: Roxy Amaro \"Lanny\"  Home Phone: [phone]  Mobile Phone: [phone]  Relation: Child  Preferred language: English   needed?  No  Secondary Emergency Contact: [address] [place] [address] Phone: [phone]  Relation: Other    Past Surgical History:  Past Surgical History:   Procedure Laterality Date    ARTERIAL BYPASS SURGRY Left 2016    Axillary/Subclavian to Brachial Bypass w/reversed SVG    CARDIAC CATHETERIZATION  2018    Dr. Roberto Rivera - at [facility]  2020    Dr. Bryon Deleon      pancreatitis post surgery    CORONARY ANGIOPLASTY WITH STENT PLACEMENT  2018    MELODY- 3.5 x 38 and 3.5 x 20 to Cx    CORONARY ANGIOPLASTY WITH STENT PLACEMENT  2018    MELODY- 3.0 x 38 and 2.75 x 26 and 2.75 x 8 and 2.75 x 22 to the LAD    CORONARY ANGIOPLASTY WITH STENT PLACEMENT  10/07/2019    MELODY- 2.5 x 8 to [address] GRAFT N/A 2020    CORONARY ARTERY BYPASS GRAFTING X 1, ON PUMP BEATING HEART, INTERNAL MAMMARY ARTERY TO LEFT ANTERIOR DESCENDING ARTERY, VAS CATH INSERTION, URI, PLATELET GEL APPLICATION, 5 LEVEL BILATERAL INTERCOSTAL NERVE BLOCK, STERNAL PLATING performed by Ryan Sheikh MD at [address] Right 2019    fem-pop, performed by Ana Simon MD at  [facility]  2019    CardioMEMs insertion for CHF [id number]  Serial #[id number]  MRI Conditional    IR NONTUNNELED VASCULAR CATHETER  07/09/2021    IR NONTUNNELED VASCULAR CATHETER 7/9/2021 [facility] PROCED[facility]    IR TUNNELED CATHETER PLACEMENT GREATER THAN 5 YEARS  7/29/2021    IR TUNNELED CATHETER PLACEMENT GREATER THAN 5 YEARS 7/29/2021 [address] SPECIAL PROCEDURES    ROTATOR CUFF REPAIR Left 04/22/2016    TONSILLECTOMY      TRANSESOPHAGEAL ECHOCARDIOGRAM  01/26/2020    TRANSLUMINAL ANGIOPLASTY Left 10/08/2019    with stenting, at [facility]    [address] Left 04/29/2020    of the [facility] re-occlusion    TUMOR EXCISION      benign tumors X 2 chest & axilla    UPPER GASTROINTESTINAL ENDOSCOPY  04/25/2013    BX barretts, HH, gastritis    UPPER GASTROINTESTINAL ENDOSCOPY  12/10/2015    UPPER GASTROINTESTINAL ENDOSCOPY  01/06/2017    Gastritis    VASCULAR SURGERY Left 12/08/2015    upper extremity and mesenteric angiogram (diagnostic only)    VASCULAR SURGERY Bilateral 07/07/2020    diagnostic lower extremity angiogram only    VASCULAR SURGERY Left 08/04/2020    angioplasty and stent of renal artery    VASCULAR SURGERY Left 08/05/2020    coil embolization of branch renal artery vessel for bleeding    VASCULAR SURGERY Left 09/01/2020    angioplasty and stenting of subclavian artery       Immunization History:   Immunization History   Administered Date(s) Administered    Influenza Vaccine, unspecified formulation 11/04/2016    Influenza Virus Vaccine 12/11/2015, 11/21/2017    Influenza, Quadv, IM, (6 mo and older Fluzone, Flulaval, Fluarix and 3 yrs and older Afluria) 12/19/2013, 10/14/2016, 11/09/2017    Influenza, Quadv, IM, PF (6 mo and older Fluzone, Flulaval, Fluarix, and 3 yrs and older Afluria) 10/01/2019    Pneumococcal Polysaccharide ([id number]) 12/11/2015       Active Problems:  Patient Active Problem List   Diagnosis Code    Type 2 diabetes mellitus with diabetic polyneuropathy, with long-term current use of insulin ([facility] E11.42, Z79.4    Essential hypertension I10    CLINT (obstructive sleep apnea) G47.33    Tobacco abuse disorder Z72.0    GERD (gastroesophageal reflux disease) K21.9    Anxiety and depression F41.9, F32.9    Acute hyponatremia E87.1    Iron deficiency anemia D50.9    Hypovolemic shock (HCC) R57.1    CAD (coronary artery disease) I25.10    Mitral valve regurgitation I34.0    COPD (chronic obstructive pulmonary disease) (HCC) J44.9    SHAUNNA (acute kidney injury) (HCC) N17.9    Vitamin D deficiency E55.9    Dyslipidemia E78.5    Abel's esophagus K22.70    Acute on chronic congestive heart failure (HCC) I50.9    Viral hepatitis C B19.20    Pulmonary nodule R91.1    Class 2 severe obesity due to excess calories with serious comorbidity and body mass index (BMI) of 39.0 to 39.9 in adult ([facility]) E66.01, Z68.39    Bipolar disorder ([facility]) F31.9    Pulmonary hypertension (HCC) I27.20    Lower extremity edema R60.0    Habitual self-excoriation F42.4    Ulcer of left lower leg, limited to breakdown of skin (HCC) L97.921    Ulcer of right leg, limited to breakdown of skin ([address] L97.911    Arthritis M19.90    Cardiomyopathy (HCC) I42.9    Chronic systolic congestive heart failure (HCC) I50.22    Acute hyperkalemia E87.5    Chronic renal impairment N18.9    Peripheral venous insufficiency I87.2    PAD (peripheral artery disease) (HCC) I73.9    Acute renal failure (HCC) N17.9    Morbid obesity with BMI of 40.0-44.9, adult ([facility]) E66.01, Z68.41    CKD (chronic kidney disease) stage 4, GFR 15-29 ml/min (HCC) N18.4    Hypokalemia E87.6    Dyspnea on exertion R06.00    Ischemic heart disease I25.9    Moderate protein-calorie malnutrition (HCC) E44.0    DKA, type 2, not at goal [facility]) E11.10    Acute on chronic systolic CHF (congestive heart failure) ([facility]) L62.56    Acute systolic CHF (congestive heart failure) ([facility]) I50.21    CHF (congestive heart failure), NYHA class I, acute on chronic, combined ([facility]) I50.43    Cellulitis L03.90    Closed nondisplaced fracture of navicular bone of left foot S92.255A    Heart failure ([facility]) I50.9    Generalized weakness R53.1    Sepsis ([facility]) A41.9    Acute encephalopathy G93.40       Isolation/Infection:   Isolation            No Isolation          Patient Infection Status       Infection Onset Added Last Indicated Last Indicated By Review Planned Expiration Resolved Resolved By    None active    Resolved    COVID-19 Rule Out 01/11/21 01/11/21 01/11/21 COVID-19 (Ordered)   01/11/21 Rule-Out Test Resulted    COVID-19 Rule Out 12/21/20 12/21/20 12/21/20 COVID-19 (Ordered)   12/22/20 Rule-Out Test Resulted    COVID-19 Rule Out 11/30/20 11/30/20 11/30/20 COVID-19 (Ordered)   11/30/20 Rule-Out Test Resulted            Nurse Assessment:  Last Vital Signs: /66   Pulse 97   Temp 97.4 °F (36.3 °C) (Oral)   Resp 17   Ht 5' 1.5\" (1.562 m)   Wt 256 lb 13.4 oz (116.5 kg) Comment: 1 pillow and 1 heavy blanket included  LMP 10/24/2012   SpO2 100%   BMI 47.74 kg/m²     Last documented pain score (0-10 scale): Pain Level: 8  Last Weight:   Wt Readings from Last 1 Encounters:   08/02/21 256 lb 13.4 oz (116.5 kg)     Mental Status:  oriented, alert and coherent    IV Access:  - Central Venous Catheter  - site  right and subclavian, insertion date: 7/28/2021    Nursing Mobility/ADLs:  Walking   Dependent  Transfer  Assisted  Bathing  Assisted  Dressing  Assisted  Toileting  Assisted  Feeding  Independent  Med Admin  Assisted  Med Delivery   whole    Wound Care Documentation and Therapy:  Wound 01/11/21 Chest (Active)   Number of days: 202       Wound 04/22/21 Leg Left; Lower; Anterior; Lateral wounds in clusters. (Active)   Number of days: 102       Wound 04/22/21 Toe (Comment  which one) Left cluster tip of 1 + 2, dorsal on toe 3 brown and yellow slough. (Active)   Dressing Status Clean;Dry; Intact 08/01/21 2113   Dressing/Treatment Open to air;Betadine swabs/povidone iodine 08/02/21 0816   Number of days: 102       Wound 04/22/21 Leg Right (Active)   Number of days: 102       Wound 07/06/21 Radial Distal;Left skin tear, steri strips applied (Active)   Number of days: 27       Wound 07/29/21 Left;Right; Outer; Lower cluster of venous statis wounds healing (dry and intact) (Active)   Dressing Status New dressing applied;Clean;Dry; Intact 08/02/21 0816   Dressing/Treatment Other (comment) 08/02/21 0816   Dressing Change Due 08/02/21 08/02/21 0816   Number of days: 4        Elimination:  Continence:   · Bowel: No  · Bladder: No  Urinary Catheter: Removal Date 8/3/2021   Colostomy/Ileostomy/Ileal Conduit: No       Date of Last BM: 8/1/2021  Intake/Output Summary (Last 24 hours) at 8/2/2021 1617  Last data filed at 8/2/2021 1500  Gross per 24 hour   Intake 872 ml   Output 5900 ml   Net -5028 ml     I/O last 3 completed shifts: In: 0 [P.O.:472]  Out: 5900 [Urine:3500]    Safety Concerns: At Risk for Falls    Impairments/Disabilities:      None    Nutrition Therapy:  Current Nutrition Therapy:   - Oral Diet:  Renal    Routes of Feeding: Oral  Liquids: Thin Liquids  Daily Fluid Restriction: yes - amount 1000ml  Last Modified Barium Swallow with Video (Video Swallowing Test): not done    Treatments at the Time of Hospital Discharge:   Respiratory Treatments: as ordered  Oxygen Therapy:  is not on home oxygen therapy. Ventilator:    - No ventilator support    Rehab Therapies: Physical Therapy, Occupational Therapy and SN  Weight Bearing Status/Restrictions: No weight bearing restirctions  Other Medical Equipment (for information only, NOT a DME order):  wheelchair, walker and hospital bed  Other Treatments: monitor dressing to R groin for s/s of infection.  May remove dressing on 8/4/2021    Patient's personal belongings (please select all that are sent with patient):  all belonging sent    RN SIGNATURE:  Electronically signed by Alexandria Kirby RN on 8/3/21 at 1:25 PM EDT    CASE MANAGEMENT/SOCIAL WORK SECTION    Inpatient Status Date: 7/26/2021    Readmission Risk Assessment Score:  Readmission Risk              Risk of Unplanned Readmission:  55           Discharging to Facility/ Agency    Name: [facility]   Address: [address], [address] [place] [address]   Phone: [phone]   Fax: [phone]      Dialysis Facility (if applicable)   · Name: [address] [place] [address],[address]  · Address: [address] [place] [address]  · Dialysis Schedule:  Tuesday, Thursday, Saturday at 6:00am arrival with a 6:15am chair time. · Phone: [phone]  · Fax: [phone]    / signature: Electronically signed by Cora Benitez RN on 8/3/21 at 11:05 AM EDT    PHYSICIAN SECTION    Prognosis: Good    Condition at Discharge: Stable    Rehab Potential (if transferring to Rehab): Good    Recommended Labs or Other Treatments After Discharge:     Physician Certification: I certify the above information and transfer of Yany Shah  is necessary for the continuing treatment of the diagnosis listed and that she requires [facility] for less 30 days.      Update Admission H&P: No change in H&P    PHYSICIAN SIGNATURE:  Electronically signed by Leanna Kang MD on 8/3/21 at 11:43 AM EDT

## 2021-08-02 NOTE — PROGRESS NOTES
Comprehensive Nutrition Assessment    Type and Reason for Visit:  Initial, RD Nutrition Re-Screen/LOS (LOS x 7)    Nutrition Recommendations/Plan:   1. Continue ADULT DIET; Regular; 4 carb choices (60 gm/meal); Low Potassium (Less than 3000 mg/day); Low Phosphorous (Less than 1000 mg/day); with 2000 ml daily FR   2. Add Nepro with breakfast trays  3. Monitor appetite, meal intake, and ONS acceptance/intake. 4. Monitor renal function, nutrition related lab values, weight trends, bowel function, and POC. Nutrition Assessment:  patient is nutritionally compromised AEB altered mental status PTA and poor appetite/intake during admission, and she is at risk for further compromise d/t  altered nutrition related lab values, increased nutrient needs r/t need for permanent HD + Septic Shock, and hx of moderate PCM (12/20), COPD, CHF, DM 2, CKD; Will continue ADULT DIET; Regular; 4 carb choices (60 gm/meal); Low Potassium (Less than 3000 mg/day); Low Phosphorous (Less than 1000 mg/day); 2000 ml FR + add Nepro to breakfast trays    Malnutrition Assessment:  Malnutrition Status: At risk for malnutrition (Comment)    Context:  Acute Illness     Findings of the 6 clinical characteristics of malnutrition:  Energy Intake:  1 - 75% or less of estimated energy requirements for 7 or more days (po intake has varied x 7 days admission + hx of poor appetite during admission on 7/2/21)  Weight Loss:  Unable to assess (d/t fluid accumulation/disturbances)     Body Fat Loss:  Unable to assess (pt in Dialysis at time of assessment)     Muscle Mass Loss:  Unable to assess (pt in Dialysis at time of assessment)    Fluid Accumulation:  7 - Moderate to Severe Extremities, Generalized (Generalized edema; +2 BLE non pitting)   Strength:  Not Performed    Estimated Daily Nutrient Needs:  Energy (kcal):  8263-5719 kcals based on 15-18 kcals/kg/CBW (+dialysis, range adjusted downward);  Weight Used for Energy Requirements:  Current Protein (g):  59-74 g protein based on 1.2-1.5 g/kg/IBW (+dialysis); Weight Used for Protein Requirements:  Ideal        Fluid (ml/day):  2000 ml FR; Method Used for Fluid Requirements:  Other (Comment) (FR)      Nutrition Related Findings:  pt in Dialysis at time of assessment; pt from SNF-[facility]GT, plans to return to; need for permanent HD after d/c; Na, Cl, H/H are low; GFR = 16; BUN, Creat, BG, ALT, AST, Alk Phos, and Bilirubin are elevated; TDC placed on 7/29; last HD treatment on 7/30, removal of 2.1 L; skin dry, warm, swollen; BLE +2 nonpitting edema + generalized edema; abd soft, rounded, nontender. BS active; BM 8/1/21; noted on 7/29- pt started experiencing loss of appetite per flowsheets + po intake has varied during admission; hx of poor appetite during recent admission at [facility] on 7/2/21 + hx of moderate PCM on 12/1/20 per past encounters; pt with venous ulcers and diabetic ulcers; Wounds:  Diabetic Ulcer, Venous Stasis (Venous ulcer BLE; Diabetic Ulcer Left 1,2, +3 toes)       Current Nutrition Therapies:    ADULT DIET; Regular; 4 carb choices (60 gm/meal); Low Potassium (Less than 3000 mg/day);  Low Phosphorus (Less than 1000 mg); 2000 ml    Anthropometric Measures:  · Height: 5' 1.5\" (156.2 cm)  · Current Body Weight: 262 lb 2 oz (118.9 kg) (obtained 8/2/21; actual weight, pre-dialysis)   · Admission Body Weight: 259 lb 1.6 oz (117.5 kg) (obtained 7/29/21; actual)    · Usual Body Weight: 255 lb 1.6 oz (115.7 kg) (obtained 8/12/20 per past encounters)     · Ideal Body Weight: 108 lbs; % Ideal Body Weight 242.7 %   · BMI: 48.7  · BMI Categories: Obese Class 3 (BMI 40.0 or greater)       Nutrition Diagnosis:   · Inadequate oral intake related to increase demand for energy/nutrients, inadequate protein-energy intake, renal dysfunction as evidenced by intake 26-50%, intake 51-75%, lab values, dialysis    Nutrition Interventions:   Food and/or Nutrient Delivery:  Continue Current Diet, Start Oral Nutrition

## 2021-08-02 NOTE — PROGRESS NOTES
[facility] Progress Note           Subjective/   60y.o. year old female who we are seeing in consultation for A/CKD-4, now ESRD, on HD since 7/30. The patient was seen and examined during dialysis; she feels well today with no CP, SOB, nausea or vomiting. ROS: No fever or chills. Social: No family at bedside.     Objective/   GEN:  Chronically ill, /73   Pulse 83   Temp 97 °F (36.1 °C) (Oral)   Resp 14   Ht 5' 1.5\" (1.562 m)   Wt 256 lb 6.4 oz (116.3 kg)   LMP 10/24/2012   SpO2 98%   BMI 47.66 kg/m²      HEENT: non-icteric, no JVD  CV: S1, S2 without m/r/g; +++ LE edema  RESP: CTA B without w/r/r; breathing wnl  ABD: +bs, soft, nt, no hsm  SKIN: warm, no rashes  ACCESS: R IJ TDC (7/29)    Data/  Recent Labs     07/31/21 0445 08/01/21  0426 08/02/21  0429   WBC 6.0 6.2 6.1   HGB 7.3* 7.7* 7.9*   HCT 23.5* 24.5* 25.4*   MCV 85.7 84.4 84.5    226 228     Recent Labs     07/31/21 0445 08/01/21  0426 08/02/21  0429   * 127* 128*   K 4.4 4.4 3.6    92* 93*   CO2 21 21 24   GLUCOSE 202* 139* 162*   BUN 52* 67* 67*   CREATININE 2.7* 3.4* 3.0*   LABGLOM 18* 14* 16*   GFRAA 22* 17* 19*       Assessment/         - Acute kidney injury - ATN in setting of suspected septic shock     - Chronic Kidney Disease:  Stage IV - now ESRD             Etiology: Multiple episodes of SHAUNNA, background vascular disease             Highly variable SCr depending on cardiac and volume status but overall worsening              SCr low to mid 2 range at it best, last at 2.4 this month   Follows with Dr. Mary Ann Villavicencio in our office     - Chronic Hypotension:  Impacts pressure natriuresis      - Anion-gap metabolic acidosis - in setting of elevated lactate & SHAUNNA     - Hyponatremia:  Hypervolemic / Due to CHF              On tolvaptan 3 days a week                 - Systolic Heart Failure:  EF = 25%                 - Hyperkalemia - better with medical treatment with lokelma     - Anemia - trend with UF, monitoring for BELKIS needs       Plan/     - HD ongoing per MWF schedule  - Continue midodrine 10 mg TID  - Continue Torsemide  - Hold Metolazone  - Continue Eliquis 2.5 mg po BID for ESRD dosing     Needs outpatient HD placement as ESRD

## 2021-08-02 NOTE — PROGRESS NOTES
IM Progress Note    Admit Date:  7/26/2021    Patient has been initiated on hemodialysis. Alem Ramírez placed on 7/29/2021   She had her first dialysis 7/30/2021  Next H/D today 8/2    Subjective:  Ms. Mai Erazo seen   Still feels very weak. Diffuse edema+. urine output is starting to  . creatinine is down to 3.   next dialysis today. Objective:   Patient Vitals for the past 4 hrs:   BP Temp Temp src Pulse Resp SpO2 Weight   08/02/21 1050 111/60 97.9 °F (36.6 °C)  82 18  262 lb 2 oz (118.9 kg)   08/02/21 0805 105/73 97 °F (36.1 °C) Oral 83 14 98 %        Intake/Output Summary (Last 24 hours) at 8/2/2021 1133  Last data filed at 8/2/2021 0816  Gross per 24 hour   Intake 472 ml   Output 2650 ml   Net -2178 ml       Physical Exam:   Gen: Obese, chronically ill appearing female, No distress. Alert. She appears chronically ill and very fatigued  Eyes: No conjunctival injection. ENT: No discharge. Pharynx clear. Neck:  Trachea midline. R TDC in place with c/d/i dressing, mild TTP, no crepitus   Resp: No accessory muscle use. No wheezes. No rhonchi. Diminished breath sounds with faint crackles   CV: Regular rate. Irregular rhythm. No murmur. No rub. +++ edema. Capillary Refill: Brisk,< 3 seconds   Peripheral Pulses: +2 palpable, equal bilaterally   GI: Firm, significant SQ edema, Non-tender. Non-distended. Unable to hear BS likely 2/2 body habitus and edema   Skin: Warm and dry. Chronic skin changes to BLEs, multiple areas of ecchymosis and superficial abrasions, R femoral CVC   M/S: + edema . Neuro: Awake. Grossly nonfocal, generalized weakness    Psych: Oriented x 3. No anxiety or agitation.      Scheduled Meds:   b complex-C-folic acid  1 capsule Oral Daily    torsemide  100 mg Oral Daily    apixaban  2.5 mg Oral BID    midodrine  10 mg Oral TID WC    metoprolol tartrate  12.5 mg Oral BID    insulin lispro  0-18 Units Subcutaneous 4x Daily WC    polyethylene glycol  17 g Oral Daily    clopidogrel  75 mg Oral Daily    phytonadione (VITAMIN K)  IVPB  10 mg Intravenous Once    atorvastatin  20 mg Oral Nightly    zolpidem  5 mg Oral Nightly    miconazole   Topical BID    pantoprazole  40 mg Intravenous Daily    sodium chloride flush  5-40 mL Intravenous 2 times per day    vancomycin (VANCOCIN) intermittent dosing (placeholder)   Other RX Placeholder       Continuous Infusions:   dextrose      sodium chloride Stopped (07/31/21 1531)       PRN Meds:  HYDROcodone 5 mg - acetaminophen, heparin (porcine), midodrine, dextrose, dextrose, sodium chloride flush, sodium chloride, ondansetron **OR** ondansetron, acetaminophen **OR** acetaminophen, glucose, glucagon (rDNA)      Data:  CBC:   Recent Labs     07/31/21 0445 08/01/21 0426 08/02/21 0429   WBC 6.0 6.2 6.1   HGB 7.3* 7.7* 7.9*   HCT 23.5* 24.5* 25.4*   MCV 85.7 84.4 84.5    226 228     BMP:   Recent Labs     07/31/21 0445 08/01/21 0426 08/02/21 0429   * 127* 128*   K 4.4 4.4 3.6    92* 93*   CO2 21 21 24   BUN 52* 67* 67*   CREATININE 2.7* 3.4* 3.0*     LIVER PROFILE:   Recent Labs     07/31/21 0445 08/01/21 0426 08/02/21  0429   AST 63* 53* 40*   * 112* 86*   BILITOT 1.5* 1.8* 1.4*   ALKPHOS 363* 384* 354*     PT/INR:   No results for input(s): PROTIME, INR in the last 72 hours. CULTURES  Blood Cx: NGTD  COVID-19: not detected   Urine Cx: E. Faecium   Enterococcus faecium (1)    Antibiotic Interpretation MERARY Status    ampicillin Resistant >=32 mcg/mL     nitrofurantoin Intermediate 64 mcg/mL     tetracycline Resistant >=16 mcg/mL     vancomycin Sensitive 1 mcg/mL          RADIOLOGY  IR TUNNELED CVC PLACE WO SQ PORT/PUMP > 5 YEARS   Final Result   Successful ultrasound and fluoroscopy guided right internal jugular 23 cm tip   to cuff tunneled hemodialysis catheter placement. MRI ABDOMEN WO CONTRAST MRCP   Final Result   1. Status post cholecystectomy.   Proximal common bile duct measures up to 12 mm.  The mid to distal common bile duct are normal in course and caliber. Evaluation of the common bile duct is significantly limited by motion. No   obvious choledocholithiasis. 2. Anasarca with small bilateral effusions. US GALLBLADDER RUQ   Final Result   1. Patient status post cholecystectomy. 2. Mild nonspecific dilatation of the common bile duct, measuring 12.3 mm in   diameter, without demonstrable cause. Given history of elevated LFTs,   consider further characterization with a dedicated MRCP study. 3. Unremarkable sonographic appearance of the liver, right kidney, and   pancreas. XR ABDOMEN (KUB) (SINGLE AP VIEW)   Final Result   Right femoral venous catheter projects in normal position. XR CHEST PORTABLE   Final Result   1. Stable cardiomegaly. 2. Low lung volumes. CT head without contrast   Final Result   No acute intracranial abnormality. Assessment/Plan:  Septic shock  - unclear source--> possibly 2/2 cellulitis in both lower extremities-> now with + Urine Cx   - Received 4 L of IV fluids in the emergency room. - Placed on Levophed. Now off pressors. - Blood Cx: NGTD, Urine Cx:Enterrococcus faecium-->sensitive to Vanc   -Continue IV Vanco, day 5  , dosed for dialysis . Given IV Cefepime D#4-->now stopped     UTI  - Urine Cx with E. Faecium-->senstive to Vanc   - Continue Vancomycin, pharmacy to dose     Acute metabolic encephalopathy--resolved  - 2/2 septic shock  - CT head WNL     Elevated LFTs  - No abdominal tenderness  - Denies alcohol  - Likely 2/2 severe sepsis and shock.    - Obtain right upper quad ultrasound-results reviewed.     - Follow hepatic profile closely-->LFTs improving   - GI consult: MRCP: no obvious choledocholithiasis   - GI okayed to resume statin, LFTS stable with statin resumed      SHAUNNA on CKD stage IV  -  Likely ATN in the setting of  septic shock  - Received 4 L of IV fluids-->Was on Levophed but now off.    - Nephrology consult: s/p tunneled dialysis catheter placement on 7/29, plans for HD today (7/30)  - Monitor renal function with HD, no permanent HD unit scheduled for now--CM aware of possible need and will continue to follow   - Having good UOP  -Has dialysis on 7/30/2021 . Next dialysis scheduled for Monday 8/2 . AGMA  - 2/2 acute kidney injury  - Resolving.     Chronic sCHF  - Last EF 25% 8/2020.    - Hold torsemide and metoprolol given low blood pressure readings-->okay to resume BB now    - Not on ACE/ARB given chronic kidney disease  - Continue to hold diuretics-->resuming diuretics (8/1)      CAD s/p CABG, stents. - Elevated troponin 2/2 increased demand and chronic kidney disease.    - Hold aspirin per cardiology for now. - Lipitor held secondary to elevated LFTs-->okayed by GI to resume      History of PAF  - Currently sinus tachycardia  - Eliquis held per cardiology-->okay to resume tomorrow      Type 2 diabetes  - Controlled  - Placed on sliding scale insulin  - BG is stable     COPD  - Stable  - Continue inhalers.     Acute on chronic anemia. - Anemia secondary to chronic kidney disease.  - Drop in H&H over the past 2 to 3 weeks. - Hemoccult stool. Hold antiplatelets and Eliquis for now-->okay to resume     Hyponatremia  - On tolvaptan 3 days a week. - Nephrology following.     Hyperkalemia  - Received  Lokelma    HLD  - Continue statin       Hypotension   - BP remaining low normal   - home BB intermittently held, would decrease dosing   - Monitor with resumption of diuretics   - Does have PRN Midodrine when needed    DVT Prophylaxis: resume Eliquis  Diet: ADULT DIET; Regular; 4 carb choices (60 gm/meal); Low Potassium (Less than 3000 mg/day); Low Phosphorus (Less than 1000 mg); 2000 ml  Code Status: Full Code     HD today. Resumed on diuretics. Monitor renal function closely monitor for recovery. .  Eliquis and Plavix resumed. Decreased Lopressor from 25 mg BID to 12.5 mg BID. Monitor blood pressure . PT OT  SNF placement.     Awaiting outpatient HD spot/ or stopping dialysis if she has good renal recovery    Allyson Link MD 11:33 AM 8/2/2021

## 2021-08-02 NOTE — FLOWSHEET NOTE
08/02/21 1050 08/02/21 1405   Vital Signs   /60 128/62   Temp 97.9 °F (36.6 °C) 97.7 °F (36.5 °C)   Pulse 82 90   Weight 262 lb 2 oz (118.9 kg) 256 lb 13.4 oz (116.5 kg)  (1 pillow and 1 heavy blanket included)     Treatment time: 180min    Net UF: 2000ml    Pre weight: 118.9k  Post weight: 116.5k  EDW: TBD    Access used: RIMERLENE TDC  Access function: Good    Medications or blood products given: Midodrine 10mg po    Regular outpatient schedule: TBD    Summary of response to treatment: Tolerated fairly well. Copy of dialysis treatment record placed in chart, to be scanned into EMR.

## 2021-08-02 NOTE — PROGRESS NOTES
Vanc-Day 8  Vanc random, 12.1 today.  HD today, not sure of exact schedule  Will place order for Vanc 500mg at end of HD  Port Kristi DUARTE 8/2/20212:13 PM  .

## 2021-08-02 NOTE — PROGRESS NOTES
Report and transfer of care to [facility], R.N. Patient denies pain/discomfort at this time. Call light in reach. Bed alarm on.

## 2021-08-02 NOTE — PLAN OF CARE
Nutrition Problem #1: Inadequate oral intake  Intervention: Food and/or Nutrient Delivery: Continue Current Diet, Start Oral Nutrition Supplement  Nutritional Goals: patient will consume 75% or greater of meals on ADULT DIET; Regular; 4 carb choices (60 gm/meal); Low Potassium (Less than 3000 mg/day);  Low Phosphorous (Less than 1000 mg/day) + patient will consume 75% or greater of Nepro with breakfast meals; patient will adhere to 2000 ml daily fluid restriction;

## 2021-08-02 NOTE — CARE COORDINATION
INTERDISCIPLINARY PLAN OF CARE CONFERENCE    Date/Time: 8/2/2021 4:06 PM  Completed by:  SANIA Mcdermott. Case Management      Patient Name:  Rupal Eng  YOB: 1965  Admitting Diagnosis: Sepsis [facility]) [A41.9]     Admit Date/Time:  7/26/2021 12:21 PM    Chart reviewed. Interdisciplinary team contacted or reviewed plan related to patient progress and discharge plans. Disciplines included Case Management, Nursing, and Dietitian. Current Status:Ongoing   PT/OT recommendation for discharge plan of care: SNF    Expected D/C Disposition:  Skilled nursing facility    Discharge Plan Comments: Chart review completed. Called and spoke with Larry Ashley, nathaniel at [facility] ([address]) who stated they are still awaiting precert as it was started on Friday 07/30/2021. Cielo Fenton at [facility] at [phone] who stated Roger Armendariz with [facility] took over the pt's case. Nicole Rogers then transferred writer to [facility]. Roger Armendariz stated they are still awaiting a chair time for [facility] and they anticipate hearing something tomorrow. Home O2 in place on admit: No    Addendum at 4:15pm: Received call from Roger Armendariz at [facility] who stated pt has the following Chair Time at [facility]; Tuesday, Thursday, Saturday at 6:00am arrival with a 6:15am chair time. Phone: [phone]  Fax: [phone] and spoke with pt's daughter Gonsalo Sung" who was updated on the above. Zaira Batista stated understanding and stated she will be transporting pt to HD. She is aware precert is still pending to the [address] [place] [address].

## 2021-08-02 NOTE — PROGRESS NOTES
Patient returned from dialysis in stable condition. VS obtained, WNL. Emptied 1,150 ml of yellow clear urine at this time. New stat lock applied. Repositioned patient for comfort, and completed sterile dressing change to right femoral CVC line. All lines re-accessed for patency (all with continued brisk blood return, flushed and alcohol capped). Vancomycin IV administered per orders to receive at end of dialysis. Pharmacy dosing, next vancomycin level to be checked on 8/3/21 0600. Assisted patient to order late lunch and dinner meal. Patient denies additional needs at this time. Call light in reach. Bed alarm on.

## 2021-08-02 NOTE — PROGRESS NOTES
Inpatient Occupational Therapy Treatment    Unit: ICU  Date:  8/2/2021  Patient Name:    Olivier Bautista  Admitting diagnosis:  Sepsis [facility]) [A41.9]  Admit Date:  7/26/2021  Precautions/Restrictions/WB Status/ Lines/ Wounds/ Oxygen: fall risk, IV, bed/chair alarm and roche catheter     Treatment Time: 1010 -1048  Treatment Number: 3    Billable Treatment Time:38 minutes   Total Treatment Time:   38   minutes    Patient Goals for Therapy:  \" not stated \"      Discharge Recommendations: SNF  DME needs for discharge: defer to facility       Therapy recommendations for staff:   Assist of 1 with use of rolling walker (RW) for all transfers to/from BSC/chair    History of Present Illness: Per H&P Frank Moon 927/21  47-year-old   female who is a poor historian right now, in and out of alertness,  presenting to hospital with what appears to be altered mentation since  the last couple of days, progressive at the nursing facility, who was  apparently picking at her skin wounds, was found to be hypotensive and  confused and hence unable to provide a very good history without nausea,  vomiting, fevers, or chills. Per the documentation in the EMR, the  patient was just staring at the ceiling and hence much history could not  be obtained by the EMS.    PAST MEDICAL HISTORY:  1. Chronic kidney disease. 2.  Chronic combined systolic and diastolic congestive heart failure. 3.  Atrial fibrillation. 4.  Coronary artery disease with cardiomyopathy. 5.  Morbid obesity with a BMI of 43.5 kg/m2. 6.  Type 2 diabetes.   7.  Chronic bilateral lower extremity wounds.     Home Health S4 Level Recommendation:  NA  AM-PAC Score: AM-PAC Inpatient Daily Activity Raw Score: 13    Pain  Yes  Rating:moderate  Location:chronic back pain, left ankle   Pain Medicine Status: No request made      Cognition    A&O Person, Place and Time   Able to follow 2 step commands    Subjective  Pt in bed and willing to work with OT with encouragement. Pt moving slowly today. Balance  Functional Sitting Balance:  WFL  Functional Standing Balance:Impaired CGA x1with RW -PT stood approx 30 seconds then wanted to sit again due to reported pain in the left ankle     Bed mobility:    Supine to sit:   Not Tested- pt sitting on the edge of the bed when therapist arrived   Sit to supine: Mod A for LEs   Rolling:               NT  Scooting in sitting:  CGA  Scooting to head of bed: Mod A of 2    Bridging:   Not Tested    Transfers:    Sit to stand:  Min assist 3x   Stand to sit:  Min A  Bed to chair:   CGA with RW   Standard toilet: Not Tested  Bed to [facility]:  Not Tested   Functional mobility 2x with RW with CGA , needing rest break with SOB    Dressing:      UE:   Not Tested  LE:    Max assist to don socks     Bathing:    UE:  Not Tested  LE:  Not Tested    Eating:   Not Tested    Toileting:  Not Tested    Activity Tolerance   Pt completed therapy session with fatigue and SOB   Following gait:  HR: 89bpm  SpO2: 99%      Positioning Needs:   Pt in bed, alarm set, positioned in proper neutral alignment and pressure relief provided. Call light provided and all needs within reach     Exercise / Activities Initiated:   Shoulder shrugs 12x  Hand flex/ext 12x     Patient/Family Education:   Role of OT  Recommendations for DC  Safe RW use/hand placement    Assessment :  Patient was min assist for sit to stand from bed and chair. The pt was CGA with RW to functionally ambulate. The pt was SOB and was fatigued  Pt was SOB and Sp02 was 99% after ambulation. The transport staff arrived and wanted to have pt back to bed to take pt to dialysis so pt did not stay up in the chair. Recommend continued acute OT and therapy at SNF to increase functional independence. Goal(s) : To be met in 3 Visits:  1). Bed to toilet/BSC: Min A- goal met - 8-2-21 new goal SBA     To be met in 5 Visits:  1). Supine to/from Sit:  Mod A, goal met 7/31, increase to Min A  2).  Upper Body Bathing:   SBA  3). Lower Body Bathing: Mod A  4). Upper Body Dressing:  SBA  5). Lower Body Dressing: Mod A  6).  Pt to demonstrate UE exs x 15 reps with minimal cues    Plan:  Continue RIO Welsh OTR/L [id number]      If patient discharges from this facility prior to next visit, this note will serve as the Discharge Summary

## 2021-08-03 PROBLEM — R57.1 HYPOVOLEMIC SHOCK (HCC): Status: RESOLVED | Noted: 2018-05-23 | Resolved: 2021-01-01

## 2021-08-03 PROBLEM — A41.9 SEPSIS (HCC): Status: RESOLVED | Noted: 2021-01-01 | Resolved: 2021-01-01

## 2021-08-03 NOTE — CARE COORDINATION
at [facility]. Dr. Roxane Durant states that he approves for pt to d/c today and can skip HD here on Wednesday, with a start of HD on Thursday at [facility]. CM informed Verna FONSECA and Dr. Kane Klein. Home O2 in place on admit: Yes  Pt informed of need to bring portable home O2 tank on day of discharge for nursing to connect prior to leaving:  Yes  Verbalized agreement/Understanding: Yes                                                                                                          DISCHARGE ORDER  Date/Time 8/3/2021 11:06 AM  Completed by: Valorie aPrada RN, Case Management    Patient Name: Juan Daniel Sinclair    : 1965      Admit order Date and Status:2021 inpt  Noted discharge order. (verify MD's last order for status of admission/Traditional Medicare 3 [place] Inpatient qualifying stay required for SNF)    Confirmed discharge plan with:              Patient:  Yes              When pt confirms DC plan does any support person need to be contacted by CM Yes if yes who___Lanny (daughter) [phone]___                      Discharge to Facility: [facility]   · Facility phone number for staff giving report: Name: [facility]ravo  · Address: [address], [address] [place] [address]  · Phone: [phone]  · Fax: [phone]     Pre-certification completed: yes, per Houston Olsen, it was received back today (8/3/2021)   Hospital Exemption Notification (HENS) completed: not needed, as pt is returning   Discharge orders and Continuity of Care faxed to facility:  [phone]      Transportation:               Medical Transport explained with choice list offered to pt/family. Choice:Yes(no preference)  Agency used: [facility]   time:   1330      Pt/family/Nursing/Facility aware of  time: yes  Yes Names: pt, Hue Yoo (daughter), Yevgeniy Delgado RN, [id number] with [facility]  Ambulance form completed:  yes:      Comments:CM faxed CARMEN/AVS to [phone].     CM called Enrico Rosas with [facility] and LM informing her that pt was discharging to [facility] today and for [facility] to notify [facility] that pt was discharging today and will be there for start of HD on this Thursday (8/5) with start of 0615am chair time. [facility] is notifying them, as this is the pt's first HD appt with them. CM faxed last neph note/HD note/CARMEN/AVS to [facility] to fax #: [phone]. Pt is being d/c'd to [facility] today. Pt's O2 sats are 100% on RA. Discharge timeout done with Rachael Reed RN. All discharge needs and concerns addressed. Addendum 8/3/2021 1130; Orysia Mortimer with [facility] states that she notified Romell Ganser that pt will be d/c'd today to start Thursday. Rapid Covid neg as of 8/3/2021    Discharging nurse to complete CARMEN, reconcile AVS, and place final copy with patient's discharge packet. Discharging RN to ensure that written prescriptions for  Level II medications are sent with patient to the facility as per protocol.

## 2021-08-03 NOTE — FLOWSHEET NOTE
08/03/21 0829   Vital Signs   Temp 97.4 °F (36.3 °C)   Temp Source Oral   Pulse 90   Heart Rate Source Monitor   Resp 16   /70   BP Location Left upper arm   Patient Position Semi fowlers   Level of Consciousness Alert (0)   MEWS Score 1   Patient Currently in Pain Yes   Pain Assessment   Pain Assessment 0-10   Pain Level 8   Patient's Stated Pain Goal 5   Pain Type Chronic pain   Pain Location Back   Pain Orientation Lower   Pain Descriptors Aching;Pressure   Pain Frequency Continuous   Pain Onset On-going   Clinical Progression Not changed   Functional Pain Assessment Prevents or interferes some active activities and ADLs   Non-Pharmaceutical Pain Intervention(s) Repositioned;Relaxation techniques; Rest   Oxygen Therapy   SpO2 100 %   Pulse Oximeter Device Mode Intermittent   Pulse Oximeter Device Location Left;Finger   O2 Device None (Room air)     AM assessment complete. Pt a&o x4. C/o tightness to ble, refusing to elevate. She has been dangling R leg off of the bed, educated on elevating but states she doesn't want to. Lees catheter draining clear yellow urine. Femoral vasc cath dressing c,d, I as well as vasc cath to R chest. Generalized pitting edema. Call light and bedside table within reach. Will continue to monitor.

## 2021-08-03 NOTE — PROGRESS NOTES
Pharmacy Vancomycin Consult     Vancomycin Day: 9  Current Dosing: dialysis protocol (MWF)  Current indication: sepsis    Temp max: 97.4    Recent Labs     08/02/21  0429 08/03/21  0415   BUN 67* 40*   CREATININE 3.0* 2.0*   WBC 6.1 6.7       Intake/Output Summary (Last 24 hours) at 8/3/2021 1050  Last data filed at 8/3/2021 0859  Gross per 24 hour   Intake 580 ml   Output 4350 ml   Net -3770 ml     Culture Date      Source                       Results      Ht Readings from Last 1 Encounters:   08/02/21 5' 1.5\" (1.562 m)        Wt Readings from Last 1 Encounters:   08/03/21 251 lb 9.6 oz (114.1 kg)       Body mass index is 46.77 kg/m². Estimated Creatinine Clearance: 37 mL/min (A) (based on SCr of 2 mg/dL (H)). Trough: 12.7    Assessment/Plan:  Pt is not receiving dialysis today. Dialysis schedule is M/W/F. No vanco today.   Follow vanco protocol for dialysis:    If pre-HD level is > 20 mcg/mL, hold x1 dose Vancomycin  If pre-HD level is 10-20 mcg/mL, give 500mg Vancomycin after HD  If pre-HD level is < 10 mcg/mL, give 1000mg Vancomycin after HD    Dave Louis RP

## 2021-08-03 NOTE — PROGRESS NOTES
Patient educated on discharge instructions as well as new medications use, dosage, administration and possible side effects. Patient verified knowledge. IV removed without difficulty and dry dressing in place. Telemetry monitor removed and returned to [facility]. Pt left facility in stable condition to Skilled nursing facility with all of their personal belongings. Report called to Rocío Cunha at [facility].

## 2021-08-03 NOTE — DISCHARGE SUMMARY
Name:  Malik Esparza  Room:  /1829-87  MRN:    [id number]    Discharge Summary      This discharge summary is in conjunction with a complete physical exam done on the day of discharge. Discharging Physician: Paramjit Haywood MD      Admit: 7/26/2021  Discharge:  8/3/2021    HPI taken from admission H&P:      The patient is a 40-year-old   female who is a poor historian right now, in and out of alertness,  presenting to hospital with what appears to be altered mentation since  the last couple of days, progressive at the nursing facility, who was  apparently picking at her skin wounds, was found to be hypotensive and  confused and hence unable to provide a very good history without nausea,  vomiting, fevers, or chills. Per the documentation in the EMR, the  patient was just staring at the ceiling and hence much history could not  be obtained by the EMS. Diagnoses this Admission and Hospital Course     Septic shock  - Due to UTI and  possibly  cellulitis in both lower extremities  - Received 4 L of IV fluids in the emergency room.    - Placed on Levophed.  Now off pressors.    - Blood Cx: NGTD, Urine Cx:Enterrococcus faecium-->sensitive to Vanc   - was on IV Vanco and cefepime . Off cefepime now . Received 7 days of vanc , dosed with dialysis . - sepsis resolved     UTI  - Urine Cx with E. Faecium-->senstive to Vanc   - treated with Vancomycin .     Acute metabolic encephalopathy--resolved  - 2/2 septic shock  - CT head WNL     Elevated LFTs  - No abdominal tenderness  - Denied alcohol  - Likely 2/2 severe sepsis and shock.    - Obtained right upper quad ultrasound- S/P delores, CBD mildly dilated.  MRCP recommended     - GI consulted: MRCP: no obvious choledocholithiasis  - Followed hepatic profile closely-->LFTs improved   - GI okayed to resume statin, LFTS stable with statin resumed      SHAUNNA on CKD stage IV-> ESRD  -  Likely ATN in the setting of septic shock  -  Received 4 L of IV fluids-->Was on Levophed but now off.    - Nephrology consulted:  Pt with recurrent SHAUNNA due to vascular disease. Now ESRD . Recommend dialysis . s/p tunneled dialysis catheter placement on 7/29, started on HD  - Monitored renal function with HD  - Had dialysis on 7/30/2021 and 8/2   - pt set up with [facility] Kaiser for dialysis OP. Tu, th, sat. Pt having good urine output. Still feels weak     AGMA  - 2/2 acute kidney injury  - Resolving.     Chronic sCHF  - Last EF 25% 8/2020.    - Held torsemide and metoprolol given low blood pressure readings-->resumed Now    - Not on ACE/ARB given chronic kidney disease     CAD s/p CABG, stents.    - Elevated troponin 2/2 increased demand and chronic kidney disease.  - resumed ASA, statin      History of PAF  - Currently sinus tachycardia  - Eliquis held per cardiology-->this was resumed      Type 2 diabetes  - Controlled  - Placed on sliding scale insulin  - BG is stable     COPD  - Stable  - Continue inhalers.     Acute on chronic anemia. - Anemia secondary to chronic kidney disease.  - monitored      Hyponatremia  - On tolvaptan 3 days a week.    - Nephrology following.  - Na 133 at d/c   fluids status  now  Managed with HD     Hyperkalemia - Resolved  - Received Lokelma     HLD  - Continue statin        Hypotension, tachycardia    - BP low normal . Midodrine added   -  BB intermittently held->  decreased dosing . HR better controlled now . BP stable    - Monitored with resumption of diuretics   - discharged with Midodrine, Lower dose of metoprolol      Procedures (Please Review Full Report for Details)  U/S and fluoro guided RIJ tunneled hemodialysis catheter placement    Consults    Gastroenterology  Cardiology  Pulmonology  Nephrology    Physical Exam at Discharge:    BP 99/63   Pulse 80   Temp 97.4 °F (36.3 °C) (Oral)   Resp 16   Ht 5' 1.5\" (1.562 m)   Wt 251 lb 9.6 oz (114.1 kg)   LMP 10/24/2012   SpO2 100%   BMI 46.77 kg/m²   Gen: Obese,  No distress.  Awake, oriented    She appears chronically ill and very fatigued  Eyes: No conjunctival injection. ENT: No discharge. Pharynx clear. Neck:  Trachea midline. R TDC in place with c/d/i dressing, mild TTP, no crepitus   Resp: No accessory muscle use. No wheezes. No rhonchi. Diminished breath sounds with faint crackles   CV: Regular rate. Irregular rhythm. No murmur. No rub. ++ edema. Capillary Refill: Brisk,< 3 seconds   Peripheral Pulses: +2 palpable, equal bilaterally   GI: Firm, significant SQ edema, Non-tender. Non-distended. Unable to hear BS likely 2/2 body habitus and edema   Skin: Warm and dry. Chronic skin changes to BLEs, multiple areas of ecchymosis and superficial abrasions, R femoral CVC   M/S: ++ edema . Neuro: Awake. Grossly nonfocal, generalized weakness    Psych: Oriented x 3. No anxiety or agitation. CBC:   Recent Labs     08/01/21 0426 08/02/21 0429 08/03/21  0415   WBC 6.2 6.1 6.7   HGB 7.7* 7.9* 8.0*   HCT 24.5* 25.4* 25.7*   MCV 84.4 84.5 83.2    228 231     BMP:   Recent Labs     08/01/21 0426 08/02/21 0429 08/03/21  0415   * 128* 133*   K 4.4 3.6 3.3*   CL 92* 93* 95*   CO2 21 24 26   BUN 67* 67* 40*   CREATININE 3.4* 3.0* 2.0*     LIVER PROFILE:   Recent Labs     08/01/21 0426 08/02/21 0429 08/03/21  0415   AST 53* 40* 30   * 86* 71*   BILITOT 1.8* 1.4* 1.7*   ALKPHOS 384* 354* 347*       CULTURES    SARS-COV-2 - Rapid: Not detected     Blood cx x2: NGTD     Urine cx: >100,000 CFU/mL Enterococcus faecium    Susceptibility    Enterococcus faecium (1)    Antibiotic Interpretation MERARY Status    ampicillin Resistant >=32 mcg/mL     nitrofurantoin Intermediate 64 mcg/mL     tetracycline Resistant >=16 mcg/mL     vancomycin Sensitive 1 mcg/mL           RADIOLOGY    IR TUNNELED CVC PLACE WO SQ PORT/PUMP > 5 YEARS   Final Result   Successful ultrasound and fluoroscopy guided right internal jugular 23 cm tip   to cuff tunneled hemodialysis catheter placement.          MRI ABDOMEN WO CONTRAST MRCP   Final Result   1. Status post cholecystectomy. Proximal common bile duct measures up to 12   mm. The mid to distal common bile duct are normal in course and caliber. Evaluation of the common bile duct is significantly limited by motion. No   obvious choledocholithiasis. 2. Anasarca with small bilateral effusions. US GALLBLADDER RUQ   Final Result   1. Patient status post cholecystectomy. 2. Mild nonspecific dilatation of the common bile duct, measuring 12.3 mm in   diameter, without demonstrable cause. Given history of elevated LFTs,   consider further characterization with a dedicated MRCP study. 3. Unremarkable sonographic appearance of the liver, right kidney, and   pancreas. XR ABDOMEN (KUB) (SINGLE AP VIEW)   Final Result   Right femoral venous catheter projects in normal position. XR CHEST PORTABLE   Final Result   1. Stable cardiomegaly. 2. Low lung volumes. CT head without contrast   Final Result   No acute intracranial abnormality. Discharge Medications     Medication List      START taking these medications    b complex-C-folic acid 1 MG capsule  Take 1 capsule by mouth daily  Start taking on: August 4, 2021     HYDROcodone-acetaminophen 5-325 MG per tablet  Commonly known as: NORCO  Take 1 tablet by mouth every 6 hours as needed for Pain for up to 3 days.      midodrine 10 MG tablet  Commonly known as: PROAMATINE  Take 1 tablet by mouth 3 times daily (with meals)     polyethylene glycol 17 g packet  Commonly known as: GLYCOLAX  Take 17 g by mouth daily  Start taking on: August 4, 2021        CHANGE how you take these medications    metoprolol tartrate 25 MG tablet  Commonly known as: LOPRESSOR  Take 0.5 tablets by mouth 2 times daily  What changed: how much to take     pantoprazole 40 MG tablet  Commonly known as: PROTONIX  Take 1 tablet by mouth daily  What changed: when to take this     torsemide 100 MG tablet  Commonly known as: and ask your doctor or other care provider to review them with you. STOP taking these medications    busPIRone 30 MG tablet  Commonly known as: BUSPAR     magnesium oxide 400 (240 Mg) MG tablet  Commonly known as: MAG-OX     potassium chloride 20 MEQ extended release tablet  Commonly known as: KLOR-CON M     QUEtiapine 25 MG tablet  Commonly known as: SEROquel     Vitamin D3 25 MCG Tabs           Where to Get Your Medications      You can get these medications from any pharmacy    Bring a paper prescription for each of these medications  · HYDROcodone-acetaminophen 5-325 MG per tablet     Information about where to get these medications is not yet available    Ask your nurse or doctor about these medications  · b complex-C-folic acid 1 MG capsule  · metoprolol tartrate 25 MG tablet  · midodrine 10 MG tablet  · pantoprazole 40 MG tablet  · polyethylene glycol 17 g packet  · torsemide 100 MG tablet           Discharged in stable condition to SNF. Total time 35 minutes. > 50%  dominated by counseling and coordination of care. Follow Up: Follow up with physician at SNF.     Tyler Melo MD  8/3/21

## 2021-08-03 NOTE — PROGRESS NOTES
[facility] Progress Note           Subjective/   60y.o. year old female who we are seeing in consultation for A/CKD-4, now ESRD, on HD since 7/30. The patient was seen and examined; she was resting comfortably with no acute events noted overnight. ROS: No fever or chills. Social: No family at bedside.     Objective/   GEN:  Chronically ill, /70   Pulse 90   Temp 97.4 °F (36.3 °C) (Oral)   Resp 16   Ht 5' 1.5\" (1.562 m)   Wt 251 lb 9.6 oz (114.1 kg)   LMP 10/24/2012   SpO2 100%   BMI 46.77 kg/m²      HEENT: non-icteric, no JVD  CV: S1, S2 without m/r/g; +++ LE edema  RESP: CTA B without w/r/r; breathing wnl  ABD: +bs, soft, nt, no hsm  SKIN: warm, no rashes  ACCESS: R IJ TDC (7/29)    Data/  Recent Labs     08/01/21 0426 08/02/21 0429 08/03/21  0415   WBC 6.2 6.1 6.7   HGB 7.7* 7.9* 8.0*   HCT 24.5* 25.4* 25.7*   MCV 84.4 84.5 83.2    228 231     Recent Labs     08/01/21 0426 08/02/21  0429 08/03/21  0415   * 128* 133*   K 4.4 3.6 3.3*   CL 92* 93* 95*   CO2 21 24 26   GLUCOSE 139* 162* 198*   MG  --   --  1.90   BUN 67* 67* 40*   CREATININE 3.4* 3.0* 2.0*   LABGLOM 14* 16* 26*   GFRAA 17* 19* 31*       Assessment/         - Acute kidney injury - ATN in setting of suspected septic shock     - Chronic Kidney Disease:  Stage IV - now ESRD             Etiology: Multiple episodes of SHAUNNA, background vascular disease             Highly variable SCr depending on cardiac and volume status but overall worsening              SCr low to mid 2 range at it best, last at 2.4 this month   Follows with Dr. Noris Duckworth in our office     - Chronic Hypotension:  Impacts pressure natriuresis      - Anion-gap metabolic acidosis - in setting of elevated lactate & SHAUNNA     - Hyponatremia:  Hypervolemic / Due to CHF              On tolvaptan 3 days a week                 - Systolic Heart Failure:  EF = 25%                 - Hyperkalemia - better with medical treatment with

## 2021-08-03 NOTE — PROGRESS NOTES
PCA notified Charge RN that pt was removing dressing to vas cath and telemetry leads. RN entered room and pt had removed vas cath dressing. RN replaced dressing via sterile technique. Primary RN notified of dressing change.

## 2021-08-03 NOTE — PROGRESS NOTES
Bed alarm going off, pt is sitting up at side of bed. She states she wants to go to recliner. Patient moved to bedside recliner, she is not picking att. She understands and promises not to get up out of bed without calling for nurse or pct.

## 2021-08-03 NOTE — PROGRESS NOTES
Patient continues to pick the skin on left arm until it bleeds. RN placed sponge pads and reinforced with wrap. She remains alert and oriented and able to answer all questions. She is aware that she is picking her skin, states she does this at home.

## 2021-08-07 PROBLEM — K85.90 PANCREATITIS, UNSPECIFIED PANCREATITIS TYPE: Status: ACTIVE | Noted: 2021-01-01

## 2021-08-07 PROBLEM — K85.00 IDIOPATHIC ACUTE PANCREATITIS: Status: ACTIVE | Noted: 2021-01-01

## 2021-08-07 NOTE — Clinical Note
Patient Class: Inpatient [101]   REQUIRED: Diagnosis: Idiopathic acute pancreatitis [243377]   Estimated Length of Stay: Estimated stay of more than 2 midnights   Telemetry/Cardiac Monitoring Required?: No

## 2021-08-07 NOTE — ED NOTES
Attempted to call report, floor nurse unavailable and will return call     Modesta Wilder RN  08/07/21 7571

## 2021-08-07 NOTE — ED PROVIDER NOTES
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 Sandra Miguel - at [address]  01/20/2020    Dr. Leavitt High      pancreatitis post surgery    CORONARY ANGIOPLASTY WITH STENT PLACEMENT  03/16/2018    MELODY- 3.5 x 38 and 3.5 x 20 to Cx    CORONARY ANGIOPLASTY WITH STENT PLACEMENT  01/03/2018    MELODY- 3.0 x 38 and 2.75 x 26 and 2.75 x 8 and 2.75 x 22 to the LAD    CORONARY ANGIOPLASTY WITH STENT PLACEMENT  10/07/2019    MELODY- 2.5 x 8 to [address] GRAFT N/A 01/27/2020    CORONARY ARTERY BYPASS GRAFTING X 1, ON PUMP BEATING HEART, INTERNAL MAMMARY ARTERY TO LEFT ANTERIOR DESCENDING ARTERY, VAS CATH INSERTION, URI, PLATELET GEL APPLICATION, 5 LEVEL BILATERAL INTERCOSTAL NERVE BLOCK, STERNAL PLATING performed by Lenor Cabot, MD at [address] Right 05/16/2019    fem-pop, performed by Jaden Cronin MD at [address]  02/14/2019    CardioMEMs insertion for CHF LA1793  Serial #[id number]  MRI Conditional    IR NONTUNNELED VASCULAR CATHETER  07/09/2021    IR NONTUNNELED VASCULAR CATHETER 7/9/2021 [facility]    IR TUNNELED CATHETER PLACEMENT GREATER THAN 5 YEARS  7/29/2021    IR TUNNELED CATHETER PLACEMENT GREATER THAN 5 YEARS 7/29/2021 [facility]    ROTATOR CUFF REPAIR Left 04/22/2016    TONSILLECTOMY      TRANSESOPHAGEAL ECHOCARDIOGRAM  01/26/2020    TRANSLUMINAL ANGIOPLASTY Left 10/08/2019    with stenting, at [facility]   [address] Left 04/29/2020    of the [facility] re-occlusion    TUMOR EXCISION      benign tumors X 2 chest & axilla    UPPER GASTROINTESTINAL ENDOSCOPY  04/25/2013    BX barretts, HH, gastritis    UPPER GASTROINTESTINAL ENDOSCOPY  12/10/2015    UPPER GASTROINTESTINAL ENDOSCOPY  01/06/2017    Gastritis    VASCULAR SURGERY Left 12/08/2015    upper extremity and mesenteric angiogram (diagnostic only)    VASCULAR SURGERY Bilateral 07/07/2020    diagnostic lower extremity angiogram only    VASCULAR SURGERY Left 08/04/2020    angioplasty and stent of renal artery    VASCULAR SURGERY Left 08/05/2020    coil embolization of branch renal artery vessel for bleeding    VASCULAR SURGERY Left 09/01/2020    angioplasty and stenting of subclavian artery     Family History   Problem Relation Age of Onset    Heart Disease Maternal Grandmother     Cancer Mother         lung and brain cancer    Cancer Maternal Aunt         lung and brain cancer     Social History     Socioeconomic History    Marital status: Single     Spouse name: Not on file    Number of children: Not on file    Years of education: Not on file    Highest education level: Not on file   Occupational History    Not on file   Tobacco Use    Smoking status: Current Every Day Smoker     Packs/day: 1.00     Years: 30.00     Pack years: 30.00     Types: Cigarettes    Smokeless tobacco: Never Used   Vaping Use    Vaping Use: Never used   Substance and Sexual Activity    Alcohol use: No     Comment: quit 2006     Drug use: Never    Sexual activity: Yes     Partners: Male   Other Topics Concern    Not on file   Social History Narrative    Not on file     Social Determinants of Health     Financial Resource Strain:     Difficulty of Paying Living Expenses:    Food Insecurity:     Worried About Running Out of Food in the Last Year:    [address] in the Last Year:    Transportation Needs:     Lack of Transportation (Medical):  Lack of Transportation (Non-Medical):    Physical Activity:     Days of Exercise per Week:     Minutes of Exercise per Session:    Stress:     Feeling of Stress :    Social Connections:     Frequency of Communication with Friends and Family:     Frequency of Social Gatherings with Friends and Family:     Attends Shinto Services:     Active Member of Clubs or Organizations:     Attends Club or Organization Meetings:     Marital Status:    Intimate Partner Violence:     Fear of Current or Ex-Partner:     Emotionally Abused:     Physically Abused:     Sexually Abused:      No current facility-administered medications for this encounter. Current Outpatient Medications   Medication Sig Dispense Refill    hydrOXYzine (VISTARIL) 25 MG capsule Take 10 mg by mouth 3 times daily as needed for Itching      metoprolol tartrate (LOPRESSOR) 25 MG tablet Take 0.5 tablets by mouth 2 times daily      torsemide (DEMADEX) 100 MG tablet Take 1 tablet by mouth daily      polyethylene glycol (GLYCOLAX) 17 g packet Take 17 g by mouth daily      b complex-C-folic acid (NEPHROCAPS) 1 MG capsule Take 1 capsule by mouth daily      midodrine (PROAMATINE) 10 MG tablet Take 1 tablet by mouth 3 times daily (with meals)      pantoprazole (PROTONIX) 40 MG tablet Take 1 tablet by mouth daily      acetaminophen (TYLENOL) 650 MG extended release tablet Take 650 mg by mouth every 4 hours as needed for Pain      Umeclidinium Bromide (INCRUSE ELLIPTA) 62.5 MCG/INH AEPB Inhale 1 Dose into the lungs daily      fluticasone-salmeterol (ADVAIR) 250-50 MCG/DOSE AEPB Inhale 1 puff into the lungs 2 times daily      apixaban (ELIQUIS) 5 MG TABS tablet Take 1 tablet by mouth 2 times daily 60 tablet     miconazole (MICOTIN) 2 % powder Apply topically 2 times daily.  45 g 1    Insulin Degludec (TRESIBA FLEXTOUCH) 100 UNIT/ML SOPN Inject 3 Units into the skin nightly 10 pen 5    Blood Glucose Monitoring Suppl (ONE TOUCH ULTRA 2) w/Device KIT USE TO MONITOR BLOOD GLUCOSE LEVELS 1 kit 0    ONE TOUCH ULTRASOFT LANCETS MISC USE TO TEST BLOOD GLUCOSE LEVELS 4 TIMES DAILY 150 each 3    blood glucose test strips (ASCENSIA AUTODISC VI;ONE TOUCH ULTRA TEST VI) strip USE TO MONITOR BLOOD GLUCOSE LEVELS 4 TIMES DAILY 150 each 3    nitroGLYCERIN (NITROSTAT) 0.4 MG SL tablet Place 1 tablet under the tongue every 5 minutes as needed for Chest pain up to max of 3 total doses. If no relief after 1 dose, call 911. 25 tablet 0    Insulin Pen Needle (B-D ULTRAFINE III SHORT PEN) 31G X 8 MM MISC Five shots a day 200 each 5    atorvastatin (LIPITOR) 80 MG tablet TAKE 1 TABLET BY MOUTH EVERY DAY AT NIGHT 90 tablet 3    blood glucose test strips (FREESTYLE LITE) strip USE TO CHECK BLOOD GLUCOSE LEVELS FOUR TIMES DAILY 200 strip 10    benztropine (COGENTIN) 1 MG tablet Take 1 mg by mouth nightly       INSULIN SYRINGE .5CC/29G 29G X 1/2\" 0.5 ML MISC 1 each by Does not apply route daily 100 each 3    clopidogrel (PLAVIX) 75 MG tablet TAKE 1 TABLET BY MOUTH EVERY DAY 30 tablet 5    lamoTRIgine (LAMICTAL) 200 MG tablet Take 200 mg by mouth 2 times daily        Allergies   Allergen Reactions    Lisinopril Other (See Comments)     Severe hypotension       REVIEW OF SYSTEMS  10 systems reviewed, pertinent positives per HPI otherwise noted to be negative. PHYSICAL EXAM  /77   Pulse 94   Temp 97.7 °F (36.5 °C) (Oral)   Resp 20   Ht 5' 4.5\" (1.638 m)   Wt 251 lb (113.9 kg)   LMP 10/24/2012   SpO2 99%   BMI 42.42 kg/m²    GENERAL APPEARANCE: Awake and alert. Cooperative. No acute distress. HENT: Normocephalic. Atraumatic. NECK: Supple. EYES: PERRL. EOM's grossly intact. HEART/CHEST: RRR. No murmurs. LUNGS: Respirations unlabored. CTAB. Good air exchange. Speaking comfortably in full sentences.  R upper chest dialysis catheter in place. ABDOMEN: Tenderness palpation epigastrium. Soft. Non-distended. No masses. No organomegaly. No guarding or rebound. MUSCULOSKELETAL: 2+ pitting edema bilateral lower extremities. Compartments soft. No deformity. No tenderness in the extremities. All extremities neurovascularly intact. SKIN: Warm and dry. No acute rashes. NEUROLOGICAL: Alert and oriented. CN's 2-12 intact. No gross facial drooping. Strength 5/5, sensation intact. PSYCHIATRIC: Normal mood and affect. LABS  I have reviewed all labs for this visit.    Results for orders placed or performed during the hospital encounter of 08/07/21   CBC Auto Differential   Result Value Ref Range    WBC 6.9 4.0 - 11.0 K/uL    RBC 3.38 (L) 4.00 - 5.20 M/uL    Hemoglobin 8.5 (L) 12.0 - 16.0 g/dL    Hematocrit 27.4 (L) 36.0 - 48.0 %    MCV 81.2 80.0 - 100.0 fL    MCH 25.3 (L) 26.0 - 34.0 pg    MCHC 31.2 31.0 - 36.0 g/dL    RDW 18.0 (H) 12.4 - 15.4 %    Platelets 091 733 - 059 K/uL    MPV 9.2 5.0 - 10.5 fL    Neutrophils % 83.8 %    Lymphocytes % 9.5 %    Monocytes % 5.8 %    Eosinophils % 0.5 %    Basophils % 0.4 %    Neutrophils Absolute 5.8 1.7 - 7.7 K/uL    Lymphocytes Absolute 0.7 (L) 1.0 - 5.1 K/uL    Monocytes Absolute 0.4 0.0 - 1.3 K/uL    Eosinophils Absolute 0.0 0.0 - 0.6 K/uL    Basophils Absolute 0.0 0.0 - 0.2 K/uL   Comprehensive Metabolic Panel w/ Reflex to MG   Result Value Ref Range    Sodium 135 (L) 136 - 145 mmol/L    Potassium reflex Magnesium 3.3 (L) 3.5 - 5.1 mmol/L    Chloride 95 (L) 99 - 110 mmol/L    CO2 26 21 - 32 mmol/L    Anion Gap 14 3 - 16    Glucose 203 (H) 70 - 99 mg/dL    BUN 24 (H) 7 - 20 mg/dL    CREATININE 2.3 (H) 0.6 - 1.1 mg/dL    GFR Non-African American 22 (A) >60    GFR  26 (A) >60    Calcium 9.5 8.3 - 10.6 mg/dL    Total Protein 6.8 6.4 - 8.2 g/dL    Albumin 3.6 3.4 - 5.0 g/dL    Albumin/Globulin Ratio 1.1 1.1 - 2.2    Total Bilirubin 1.9 (H) 0.0 - 1.0 mg/dL    Alkaline Phosphatase 305 (H) 40 - 129 U/L    ALT 33 10 - 40 U/L    AST 19 15 - 37 U/L    Globulin 3.2 g/dL   Troponin   Result Value Ref Range    Troponin 0.04 (H) <0.01 ng/mL   Lipase   Result Value Ref Range    Lipase 740.0 (H) 13.0 - 60.0 U/L   Magnesium   Result Value Ref Range    Magnesium 1.80 1.80 - 2.40 mg/dL   POCT Glucose   Result Value Ref Range    POC Glucose 205 (H) 70 - 99 mg/dl    Performed on ACCU-CHEK    EKG 12 Lead   Result Value Ref Range    Ventricular Rate 94 BPM    Atrial Rate 500 BPM    QRS Duration 160 ms    Q-T Interval 394 ms    QTc Calculation (Bazett) 492 ms    R Axis -86 degrees    T Axis 172 degrees    Diagnosis       Atrial fibrillation Controlled ventricular rateLeft axis deviation RBBBLow voltage QRSInferior-posterior infarct (cited on or before 28-JUL-2021)Anterolateral infarct (cited on or before 28-JUL-2021)Abnormal ECGWhen compared with ECG of 28-JUL-2021 15:52,Atrial fibrillation has replaced Ectopic atrial rhythmQuestionable change in initial forces of Anterolateral leadsConfirmed by SERGE MENDES, [address] (1988) on 8/7/2021 3:11:12 PM       ECG  The Ekg interpreted by me shows  atrial fibrillation with a rate of 94  Axis is   left  QTc is  prolonged at 492  Intervals and Durations are unremarkable. ST Segments: nonspecific changes  Atrial fibrillation, overall similar compared to previous performed 7/26/2021    RADIOLOGY  CT ABDOMEN PELVIS WO CONTRAST Additional Contrast? None   Final Result   1. Acute pancreatitis. 2. Cirrhosis. 3. Trace ascites. 4. Trace bilateral pleural effusions. XR CHEST PORTABLE   Final Result   Mild pulmonary vascular congestion. Stable mild cardiomegaly. ED COURSE/MDM  Patient seen and evaluated. Old records reviewed. Labs and imaging reviewed and results discussed with patient.       Patient is a 40-year-old female, presents from nursing home with concerns for 3-day history of nausea and vomiting, epigastric pain radiating to her back.  HPI as detailed above. Upon arrival in the ED, vitals were reassuring. Patient is resting comfortably, is in no acute distress. Physical exam does reveal tenderness palpation epigastrium, no guarding rigidity and she is nonperitoneal.  EKG with atrial fibrillation, similar compared to previous. Labs were performed, showed mild hypokalemia of 3.3, creatinine is 2.3 which appears to be near the patient's baseline. She did not complete her entire hemodialysis session earlier today secondary to her abdominal pain, nausea and vomiting. Troponin elevated at 0.04, she has a chronic troponin elevation of this level and has no chest pain or shortness of breath today. No leukocytosis. Lipase markedly elevated at 740, CT abdomen pelvis does show acute pancreatitis. Patient will be hospitalized for further work-up and treatment of her condition. Was treated with pain medicines and antiemetics here in the department. She is comfortable in agreement with plan of care. During the patient's ED course, the patient was given:  Medications   morphine sulfate (PF) injection 4 mg (4 mg Intravenous Given 8/7/21 1601)   ondansetron (ZOFRAN) injection 4 mg (4 mg Intravenous Given 8/7/21 1602)        CLINICAL IMPRESSION  1. Acute pancreatitis without infection or necrosis, unspecified pancreatitis type        Blood pressure 102/77, pulse 94, temperature 97.7 °F (36.5 °C), temperature source Oral, resp. rate 20, height 5' 4.5\" (1.638 m), weight 251 lb (113.9 kg), last menstrual period 10/24/2012, SpO2 99 %, not currently breastfeeding. DISPOSITION  Kristal Ojeda was admitted in stable condition. Patient was given scripts for the following medications. I counseled patient how to take these medications. New Prescriptions    No medications on file       Follow-up with:  No follow-up provider specified. DISCLAIMER: This chart was created using Dragon dictation software.   Efforts were made by me to ensure accuracy, however some errors may be present due to limitations of this technology and occasionally words are not transcribed correctly.        Isa Melo MD  08/07/21 [id number]

## 2021-08-07 NOTE — PROGRESS NOTES
Shift report received from Jose Daniel, [facility]. Resting w/eyes closed. Call light in reach.

## 2021-08-07 NOTE — ED NOTES
[facility] here to transport pt. Pt remains alert and without c/o's.       Rufina Dumont RN  08/07/21 [id number]

## 2021-08-07 NOTE — ED NOTES
Pt states she is unable to void for ordered UA. States \"I don't always make urine. \" MD made aware.  Pt resting with OU closed, resps even and unlab     Cathie Holland RN  08/07/21 [id number]

## 2021-08-07 NOTE — ED NOTES
Report called to Mary Kay Hodgson RN @ [facility] in SBAR format. Pt to ambulance via cot without incident or c/o's. Attempted to call report to [facility] staff.  RN unavailable and will return call     Ray Rivera RN  08/07/21 [id number]

## 2021-08-08 NOTE — H&P
Hospital Medicine History & Physical      PCP: Randal Desai PA-C    Date of Admission: 8/7/2021    Date of Service: Pt seen/examined on 8/8/21 and Admitted to Inpatient     Chief Complaint: Abd pain    History Of Present Illness: The patient is a 60 y.o. female who presents to [address] [place] [address] with abd pain. Pt states that she developed abd pain - mid abd, radiating to back a few days ago, yesterday, abd pain was associated with nausea/vomiting and hence presented to the ER. Found to have acute pancreatitis.  Denies ETOH abuse, states she has had her GB surgery in the past.    Past Medical History:        Diagnosis Date    Acute blood loss anemia 8/5/2020    Acute kidney injury ([address] 12/25/2019    Acute kidney injury superimposed on CKD ([address] 8/5/2020    Acute on chronic combined systolic and diastolic congestive heart failure ([address] 1/10/2020    Acute on chronic right-sided heart failure ([address] 08/2020    Alcohol dependence ([address] 3/10/2010    Angina pectoris (HCC)     Arthritis     Asthma     Atrial fibrillation (HCC)     CAD (coronary artery disease)     Cardiomyopathy ([address]     Cellulitis 6/3/2020    Charcot's joint of ankle, left     Chronic kidney disease     COPD (chronic obstructive pulmonary disease) ([address]     Diabetic ulcer of left foot associated with type 2 diabetes mellitus, limited to breakdown of skin ([address] 1/18/2020    Diabetic ulcer of toe of right foot associated with type 2 diabetes mellitus ([address] 1/18/2020    Enthesopathy     GERD (gastroesophageal reflux disease)     Hyperlipidemia     Hypertension     Left renal artery stenosis ([address] 08/2020    MI (myocardial infarction) ([address] 10/07/2019    NSTEMI    Morbid obesity ([address]     Osteoarthritis     Peripheral neuropathy     Peripheral vascular disease ([address]     Polyarthritis  Positive FIT (fecal immunochemical test) 2/28/2020    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 steal syndrome 09/01/2020    Traumatic perinephric hematoma, left, initial encounter 8/4/2020       Past Surgical History:        Procedure Laterality Date    ARTERIAL BYPASS SURGRY Left 01/06/2016    Axillary/Subclavian to Brachial Bypass w/reversed SVG    CARDIAC CATHETERIZATION  03/27/2018    Dr. Refugio Miranda - at [address] [place]  01/20/2020    Dr. Yen Mendez      pancreatitis post surgery    CORONARY ANGIOPLASTY WITH STENT PLACEMENT  03/16/2018    MELODY- 3.5 x 38 and 3.5 x 20 to Cx    CORONARY ANGIOPLASTY WITH STENT PLACEMENT  01/03/2018    MELODY- 3.0 x 38 and 2.75 x 26 and 2.75 x 8 and 2.75 x 22 to the LAD    CORONARY ANGIOPLASTY WITH STENT PLACEMENT  10/07/2019    MELODY- 2.5 x 8 to [address] GRAFT N/A 01/27/2020    CORONARY ARTERY BYPASS GRAFTING X 1, ON PUMP BEATING HEART, INTERNAL MAMMARY ARTERY TO LEFT ANTERIOR DESCENDING ARTERY, VAS CATH INSERTION, URI, PLATELET GEL APPLICATION, 5 LEVEL BILATERAL INTERCOSTAL NERVE BLOCK, STERNAL PLATING performed by Anne Marie Bustamante MD at [address] [facility] [address] Right 05/16/2019    fem-pop, performed by Kelli Heredia MD at [address]  02/14/2019    CardioMEMs insertion for CHF [id number]  Serial #[id number]  MRI Conditional    IR NONTUNNELED VASCULAR CATHETER  07/09/2021    IR NONTUNNELED VASCULAR CATHETER 7/9/2021 [facility]    IR TUNNELED CATHETER PLACEMENT GREATER THAN 5 YEARS  7/29/2021    IR TUNNELED CATHETER PLACEMENT GREATER THAN 5 YEARS 7/29/2021 [facility]    ROTATOR CUFF REPAIR Left 04/22/2016    TONSILLECTOMY      TRANSESOPHAGEAL ECHOCARDIOGRAM  01/26/2020    TRANSLUMINAL ANGIOPLASTY Left 10/08/2019    with stenting, at [facility]    TRANSLUMINAL ANGIOPLASTY Left 04/29/2020    of the [facility] re-occlusion    TUMOR EXCISION benign tumors X 2 chest & axilla    UPPER GASTROINTESTINAL ENDOSCOPY  04/25/2013    BX barretts, HH, gastritis    UPPER GASTROINTESTINAL ENDOSCOPY  12/10/2015    UPPER GASTROINTESTINAL ENDOSCOPY  01/06/2017    Gastritis    VASCULAR SURGERY Left 12/08/2015    upper extremity and mesenteric angiogram (diagnostic only)    VASCULAR SURGERY Bilateral 07/07/2020    diagnostic lower extremity angiogram only    VASCULAR SURGERY Left 08/04/2020    angioplasty and stent of renal artery    VASCULAR SURGERY Left 08/05/2020    coil embolization of branch renal artery vessel for bleeding    VASCULAR SURGERY Left 09/01/2020    angioplasty and stenting of subclavian artery       Medications Prior to Admission:    Prior to Admission medications    Medication Sig Start Date End Date Taking?  Authorizing Provider   hydrOXYzine (VISTARIL) 25 MG capsule Take 10 mg by mouth 3 times daily as needed for Itching   Yes Historical Provider, MD   metoprolol tartrate (LOPRESSOR) 25 MG tablet Take 0.5 tablets by mouth 2 times daily 8/3/21  Yes Mariella Juarez MD   torsemide (DEMADEX) 100 MG tablet Take 1 tablet by mouth daily 8/4/21  Yes Mariella Juarez MD   polyethylene glycol (GLYCOLAX) 17 g packet Take 17 g by mouth daily 8/4/21 9/3/21 Yes Mariella Juarez MD   b complex-C-folic acid (NEPHROCAPS) 1 MG capsule Take 1 capsule by mouth daily 8/4/21  Yes Mariella Juarez MD   midodrine (PROAMATINE) 10 MG tablet Take 1 tablet by mouth 3 times daily (with meals) 8/3/21  Yes Mariella Juarez MD   pantoprazole (PROTONIX) 40 MG tablet Take 1 tablet by mouth daily 8/3/21  Yes Mariella Juarez MD   acetaminophen (TYLENOL) 650 MG extended release tablet Take 650 mg by mouth every 4 hours as needed for Pain   Yes Historical Provider, MD   Umeclidinium Bromide (INCRUSE ELLIPTA) 62.5 MCG/INH AEPB Inhale 1 Dose into the lungs daily   Yes Historical Provider, MD   fluticasone-salmeterol (ADVAIR) 250-50 MCG/DOSE AEPB Inhale 1 puff into the lungs 2 times daily   Yes Historical Provider, MD   apixaban (ELIQUIS) 5 MG TABS tablet Take 1 tablet by mouth 2 times daily 7/14/21  Yes Kenyon Bean MD   miconazole (MICOTIN) 2 % powder Apply topically 2 times daily. 7/14/21  Yes Kenoyn Baen MD   Insulin Degludec (TRESIBA FLEXTOUCH) 100 UNIT/ML SOPN Inject 3 Units into the skin nightly 7/1/21  Yes Stephanie June MD   Blood Glucose Monitoring Suppl (ONE TOUCH ULTRA 2) w/Device KIT USE TO MONITOR BLOOD GLUCOSE LEVELS 5/26/21  Yes TRAY Murphy CNP   ONE TOUCH ULTRASOFT LANCETS MISC USE TO TEST BLOOD GLUCOSE LEVELS 4 TIMES DAILY 5/26/21  Yes TRAY Murphy CNP   blood glucose test strips (ASCENSIA AUTODISC VI;ONE TOUCH ULTRA TEST VI) strip USE TO MONITOR BLOOD GLUCOSE LEVELS 4 TIMES DAILY 5/26/21  Yes TRAY Murphy CNP   nitroGLYCERIN (NITROSTAT) 0.4 MG SL tablet Place 1 tablet under the tongue every 5 minutes as needed for Chest pain up to max of 3 total doses.  If no relief after 1 dose, call 911. 2/23/21  Yes TRAY Ramos CNP   Insulin Pen Needle (B-D ULTRAFINE III SHORT PEN) 31G X 8 MM MISC Five shots a day 2/1/21  Yes TRAY Murphy CNP   atorvastatin (LIPITOR) 80 MG tablet TAKE 1 TABLET BY MOUTH EVERY DAY AT NIGHT 1/11/21  Yes TRAY Ramos CNP   blood glucose test strips (FREESTYLE LITE) strip USE TO CHECK BLOOD GLUCOSE LEVELS FOUR TIMES DAILY 11/17/20  Yes Ty Alfredo MD   benztropine (COGENTIN) 1 MG tablet Take 1 mg by mouth nightly  12/13/19  Yes Historical Provider, MD   INSULIN SYRINGE .5CC/29G 29G X 1/2\" 0.5 ML MISC 1 each by Does not apply route daily 12/3/19  Yes TRAY Murphy CNP   clopidogrel (PLAVIX) 75 MG tablet TAKE 1 TABLET BY MOUTH EVERY DAY 9/3/19  Yes TRAY Ramos CNP   lamoTRIgine (LAMICTAL) 200 MG tablet Take 200 mg by mouth 2 times daily    Yes Historical Provider, MD       Allergies:  Lisinopril    Social History:  The patient currently lives at home    TOBACCO:   reports that she has been smoking cigarettes. She has a 30.00 pack-year smoking history. She has never used smokeless tobacco.  ETOH:   reports previous alcohol use. Family History:  Reviewed in detail and negative for DM, Early CAD, Cancer, CVA. Positive as follows:        Problem Relation Age of Onset    Heart Disease Maternal Grandmother     Cancer Mother         lung and brain cancer    Cancer Maternal Aunt         lung and brain cancer       REVIEW OF SYSTEMS:   Positive for abd pain and as noted in the HPI. All other systems reviewed and negative. PHYSICAL EXAM:    /75   Pulse 100   Temp 97.1 °F (36.2 °C) (Oral)   Resp 16   Ht 5' 4.5\" (1.638 m)   Wt 251 lb 12.8 oz (114.2 kg)   LMP 10/24/2012   SpO2 99%   BMI 42.55 kg/m²     General appearance: No apparent distress appears stated age and cooperative. HEENT Normal cephalic, atraumatic without obvious deformity. Pupils equal, round, and reactive to light. Extra ocular muscles intact. Conjunctivae/corneas clear. Neck: Supple, No jugular venous distention/bruits. Trachea midline without thyromegaly or adenopathy with full range of motion. Lungs: Clear to auscultation, bilaterally without Rales/Wheezes/Rhonchi with good respiratory effort. Heart: Regular rate and rhythm with Normal S1/S2   Abdomen: Soft, obese, tender in epigastrium, non-distended without rigidity or guarding and positive bowel sounds   Extremities: No clubbing, cyanosis, or edema bilaterally. Skin: Skin color, texture, turgor normal.  No rashes or lesions. Neurologic: Alert and oriented X 3, neurovascularly intact with sensory/motor intact upper extremities/lower extremities, bilaterally. Cranial nerves: II-XII intact, grossly non-focal.  Mental status: Alert, oriented, thought content appropriate.   Capillary Refill: Acceptable  < 3 seconds  Peripheral Pulses: +3 Easily felt, not easily obliterated with pressure      CBC   Recent Labs     08/07/21  1440 08/08/21  0520 WBC 6.9 6.0   HGB 8.5* 8.0*   HCT 27.4* 26.4*    179      RENAL  Recent Labs     08/07/21  1440 08/08/21  0520   * 134*   K 3.3* 2.8*   CL 95* 96*   CO2 26 21   BUN 24* 26*   CREATININE 2.3* 2.4*     LFT'S  Recent Labs     08/07/21  1440 08/08/21  0520   AST 19 15   ALT 33 25   BILITOT 1.9* 1.8*   ALKPHOS 305* 266*     COAG  No results for input(s): INR in the last 72 hours.   CARDIAC ENZYMES  Recent Labs     08/07/21  1440   TROPONINI 0.04*       U/A:    Lab Results   Component Value Date    COLORU DARK YELLOW 07/26/2021    WBCUA 3-5 07/26/2021    RBCUA 11-20 07/26/2021    MUCUS Rare 07/26/2021    BACTERIA 1+ 07/26/2021    CLARITYU SL CLOUDY 07/26/2021    SPECGRAV 1.025 07/26/2021    LEUKOCYTESUR SMALL 07/26/2021    BLOODU TRACE-INTACT 07/26/2021    GLUCOSEU Negative 07/26/2021    AMORPHOUS Rare 08/04/2020       ABG    Lab Results   Component Value Date    IXB6DNU 18.9 07/29/2021    BEART -5.4 07/29/2021    C0HCVZZM 97.9 07/29/2021    PHART 7.389 07/29/2021    DLV4HNY 32.0 07/29/2021    PO2ART 106.4 07/29/2021    WYT4LIJ 19.9 07/29/2021           Active Hospital Problems    Diagnosis Date Noted    Idiopathic acute pancreatitis [K85.00] 08/07/2021    Pancreatitis, unspecified pancreatitis type [K85.90] 08/07/2021         PHYSICIANS CERTIFICATION:    I certify that Giles Coleman is expected to be hospitalized for > than 2 midnights based on the following assessment and plan:      ASSESSMENT/PLAN:    Acute pancreatitis - NPO/IVF, pain meds IV prn. GI consulted, check triglyceride level in am. Denies ETOH use, has had cholecystectomy in the past. CT abd reviewed    H/o cirrhosis with ascitis - GI following    H/o a fib - cont eliquis    Severe hypokalemia - K 3.3-->2.8, cont to replete    ESRD - creat at baseline 2.3, nephrology consulted for dialysis T/T/S    HTN - controlled, monitor     Anemia - hb 8.5      DVT Prophylaxis: eliquis  Diet: Diet NPO Exceptions are: [employer], Sips of Water with

## 2021-08-08 NOTE — PROGRESS NOTES
RESPIRATORY THERAPY ASSESSMENT    Name:  Marc Minor  Medical Record Number:  [id number]  Age: 60 y.o. Gender: female  : 1965  Today's Date:  2021  Room:  [address] [place], [place] [address]    Assessment     Is the patient being admitted for a COPD or Asthma exacerbation? No   (If yes the patient will be seen every 4 hours for the first 24 hours and then reassessed)    Patient Admission Diagnosis      Allergies  Allergies   Allergen Reactions    Lisinopril Other (See Comments)     Severe hypotension       Minimum Predicted Vital Capacity:               Actual Vital Capacity:                    Pulmonary History:copd/chf  Home Oxygen Therapy:  room air  Home Respiratory Therapy:advair/incruse ellipta   Current Respiratory Therapy:  symbicort bid/spiriva          Respiratory Severity Index(RSI)   Patients with orders for inhalation medications, oxygen, or any therapeutic treatment modality will be placed on Respiratory Protocol. They will be assessed with the first treatment and at least every 72 hours thereafter. The following severity scale will be used to determine frequency of treatment intervention.     Smoking History: Pulmonary Disease or Smoking History, Greater than 15 pack year = 2    Social History  Social History     Tobacco Use    Smoking status: Current Every Day Smoker     Packs/day: 1.00     Years: 30.00     Pack years: 30.00     Types: Cigarettes    Smokeless tobacco: Never Used   Vaping Use    Vaping Use: Never used   Substance Use Topics    Alcohol use: Not Currently     Comment: quit      Drug use: Never       Recent Surgical History: None = 0  Past Surgical History  Past Surgical History:   Procedure Laterality Date    ARTERIAL BYPASS SURGRY Left 2016    Axillary/Subclavian to Brachial Bypass w/reversed SVG    CARDIAC CATHETERIZATION  2018    Dr. Susan Bueno - at [address]  2020    Dr. Jason Mclaughlin pancreatitis post surgery    CORONARY ANGIOPLASTY WITH STENT PLACEMENT  03/16/2018    MELODY- 3.5 x 38 and 3.5 x 20 to Cx    CORONARY ANGIOPLASTY WITH STENT PLACEMENT  01/03/2018    MELODY- 3.0 x 38 and 2.75 x 26 and 2.75 x 8 and 2.75 x 22 to the LAD    CORONARY ANGIOPLASTY WITH STENT PLACEMENT  10/07/2019    MELODY- 2.5 x 8 to [address] GRAFT N/A 01/27/2020    CORONARY ARTERY BYPASS GRAFTING X 1, ON PUMP BEATING HEART, INTERNAL MAMMARY ARTERY TO LEFT ANTERIOR DESCENDING ARTERY, VAS CATH INSERTION, URI, PLATELET GEL APPLICATION, 5 LEVEL BILATERAL INTERCOSTAL NERVE BLOCK, STERNAL PLATING performed by Brenden Cat MD at [address] Right 05/16/2019    fem-pop, performed by Alyssa Sparrow MD at [address]  02/14/2019    CardioMEMs insertion for CHF [id number]  Serial #[id number]  MRI Conditional    IR NONTUNNELED VASCULAR CATHETER  07/09/2021    IR NONTUNNELED VASCULAR CATHETER 7/9/2021 [facility]    IR TUNNELED CATHETER PLACEMENT GREATER THAN 5 YEARS  7/29/2021    IR TUNNELED CATHETER PLACEMENT GREATER THAN 5 YEARS 7/29/2021 [facility]    ROTATOR CUFF REPAIR Left 04/22/2016    TONSILLECTOMY      TRANSESOPHAGEAL ECHOCARDIOGRAM  01/26/2020    TRANSLUMINAL ANGIOPLASTY Left 10/08/2019    with stenting, at [facility]    [address] Left 04/29/2020    of the [facility] re-occlusion    TUMOR EXCISION      benign tumors X 2 chest & axilla    UPPER GASTROINTESTINAL ENDOSCOPY  04/25/2013    BX barretts, HH, gastritis    UPPER GASTROINTESTINAL ENDOSCOPY  12/10/2015    UPPER GASTROINTESTINAL ENDOSCOPY  01/06/2017    Gastritis    VASCULAR SURGERY Left 12/08/2015    upper extremity and mesenteric angiogram (diagnostic only)    VASCULAR SURGERY Bilateral 07/07/2020    diagnostic lower extremity angiogram only    VASCULAR SURGERY Left 08/04/2020    angioplasty and stent of renal artery    VASCULAR SURGERY Left 08/05/2020    coil medication at home, and lacks wheezing by examination or by history for at least 24 hours, contact physician for possible discontinuation.

## 2021-08-08 NOTE — CONSULTS
Gastroenterology Consult Note    Patient:   Rupal Eng   YOB: 1965   Facility:   [facility]   Referring/PCP: Deena Borja  Date:     8/8/2021  Consultant:   Pavel Vizcarra MD, MD    Subjective: This 60 y.o. female was admitted 8/7/2021 with a diagnosis of \"Idiopathic acute pancreatitis [K85.00]  Pancreatitis, unspecified pancreatitis type [K85.90]  Acute pancreatitis without infection or necrosis, unspecified pancreatitis type [K85.90]\" and is seen in consultation regarding \"pancreatitis\". Information was obtained from interview of  the patient, examination of the patient, and review of records. I did  update the past medical, surgical, social and / or family history. Pancreatitis mild in upper abd for 2 days assoc w pain and nausea    Current status  Present  Diet Order: Diet NPO Exceptions are: Ice Chips, Sips of Water with Meds and she is not tolerating diet. Recently, she has experienced moderate, maximum 5/10 epigastric abdominal  Pain and she has not required Intravenous narcotic analgesics. The patient has also experienced no constipation, diarrhea, fever, hematochezia, melena and vomiting      Prior to Admission medications    Medication Sig Start Date End Date Taking?  Authorizing Provider   hydrOXYzine (VISTARIL) 25 MG capsule Take 10 mg by mouth 3 times daily as needed for Itching   Yes Historical Provider, MD   metoprolol tartrate (LOPRESSOR) 25 MG tablet Take 0.5 tablets by mouth 2 times daily 8/3/21  Yes Jess Mendez MD   torsemide (DEMADEX) 100 MG tablet Take 1 tablet by mouth daily 8/4/21  Yes Jess Mendez MD   polyethylene glycol (GLYCOLAX) 17 g packet Take 17 g by mouth daily 8/4/21 9/3/21 Yes Jess Mendez MD   b complex-C-folic acid (NEPHROCAPS) 1 MG capsule Take 1 capsule by mouth daily 8/4/21  Yes Jess Mendez MD   midodrine (PROAMATINE) 10 MG tablet Take 1 tablet by mouth 3 times daily (with meals) 8/3/21  Yes Jess Mendez MD   pantoprazole (PROTONIX) 40 MG tablet Take 1 tablet by mouth daily 8/3/21  Yes Jess Mendez MD   acetaminophen (TYLENOL) 650 MG extended release tablet Take 650 mg by mouth every 4 hours as needed for Pain   Yes Historical Provider, MD   Umeclidinium Bromide (INCRUSE ELLIPTA) 62.5 MCG/INH AEPB Inhale 1 Dose into the lungs daily   Yes Historical Provider, MD   fluticasone-salmeterol (ADVAIR) 250-50 MCG/DOSE AEPB Inhale 1 puff into the lungs 2 times daily   Yes Historical Provider, MD   apixaban (ELIQUIS) 5 MG TABS tablet Take 1 tablet by mouth 2 times daily 7/14/21  Yes Jessica Silva MD   miconazole (MICOTIN) 2 % powder Apply topically 2 times daily. 7/14/21  Yes Jessica Silva MD   Insulin Degludec (TRESIBA FLEXTOUCH) 100 UNIT/ML SOPN Inject 3 Units into the skin nightly 7/1/21  Yes Leander Miller MD   Blood Glucose Monitoring Suppl (ONE TOUCH ULTRA 2) w/Device KIT USE TO MONITOR BLOOD GLUCOSE LEVELS 5/26/21  Yes TRAY Grimm CNP   ONE TOUCH ULTRASOFT LANCETS MISC USE TO TEST BLOOD GLUCOSE LEVELS 4 TIMES DAILY 5/26/21  Yes TRAY Grimm CNP   blood glucose test strips (ASCENSIA AUTODISC VI;ONE TOUCH ULTRA TEST VI) strip USE TO MONITOR BLOOD GLUCOSE LEVELS 4 TIMES DAILY 5/26/21  Yes TRAY Grimm CNP   nitroGLYCERIN (NITROSTAT) 0.4 MG SL tablet Place 1 tablet under the tongue every 5 minutes as needed for Chest pain up to max of 3 total doses.  If no relief after 1 dose, call 911. 2/23/21  Yes TRAY Marquis CNP   Insulin Pen Needle (B-D ULTRAFINE III SHORT PEN) 31G X 8 MM MISC Five shots a day 2/1/21  Yes TRAY Grimm CNP   atorvastatin (LIPITOR) 80 MG tablet TAKE 1 TABLET BY MOUTH EVERY DAY AT NIGHT 1/11/21  Yes TRAY Marquis CNP   blood glucose test strips (FREESTYLE LITE) strip USE TO CHECK BLOOD GLUCOSE LEVELS FOUR TIMES DAILY 11/17/20  Yes Júniorenzo Vasquez MD   benztropine (COGENTIN) 1 MG tablet Take 1 mg by mouth nightly  12/13/19 Yes Historical Provider, MD   INSULIN SYRINGE .5CC/29G 29G X 1/2\" 0.5 ML MISC 1 each by Does not apply route daily 12/3/19  Yes TRAY Pederson CNP   clopidogrel (PLAVIX) 75 MG tablet TAKE 1 TABLET BY MOUTH EVERY DAY 9/3/19  Yes TRAY Sorenson CNP   lamoTRIgine (LAMICTAL) 200 MG tablet Take 200 mg by mouth 2 times daily    Yes Historical Provider, MD      Scheduled Medications:    tiotropium  2 puff Inhalation Daily    potassium chloride  10 mEq Intravenous Q1H    lamoTRIgine  200 mg Oral BID    clopidogrel  1 tablet Oral Daily    benztropine  1 mg Oral Nightly    [Held by provider] atorvastatin  80 mg Oral Nightly    apixaban  5 mg Oral BID    budesonide-formoterol  2 puff Inhalation BID    [Held by provider] metoprolol tartrate  12.5 mg Oral BID    [Held by provider] polyethylene glycol  17 g Oral Daily    [Held by provider] b complex-C-folic acid  1 capsule Oral Daily    midodrine  10 mg Oral TID WC    sodium chloride flush  5-40 mL Intravenous 2 times per day    insulin lispro  0-6 Units Subcutaneous TID WC    insulin lispro  0-3 Units Subcutaneous Nightly     Infusions:    dextrose      sodium chloride 25 mL (08/08/21 0934)    sodium chloride 75 mL/hr at 08/08/21 0551     PRN Medications: nitroGLYCERIN, glucose, dextrose, glucagon (rDNA), dextrose, sodium chloride flush, sodium chloride, ondansetron **OR** ondansetron, acetaminophen **OR** acetaminophen, morphine  Allergies:    Allergies   Allergen Reactions    Lisinopril Other (See Comments)     Severe hypotension       Past Medical History:   Diagnosis Date    Acute blood loss anemia 8/5/2020    Acute kidney injury ([address] 12/25/2019    Acute kidney injury superimposed on CKD ([address] 8/5/2020    Acute on chronic combined systolic and diastolic congestive heart failure ([address] 1/10/2020    Acute on chronic right-sided heart failure ([address] 08/2020    Alcohol dependence ([address] 3/10/2010    Angina pectoris ([address]     Arthritis     Milka Monterroso MD at [address] Right 05/16/2019    fem-pop, performed by Estela Maza MD at [address]  02/14/2019    CardioMEMs insertion for CHF [id number]  Serial #[id number]  MRI Conditional    IR NONTUNNELED VASCULAR CATHETER  07/09/2021    IR NONTUNNELED VASCULAR CATHETER 7/9/2021 [facility] PROCEDURES    IR TUNNELED CATHETER PLACEMENT GREATER THAN 5 YEARS  7/29/2021    IR TUNNELED CATHETER PLACEMENT GREATER THAN 5 YEARS 7/29/2021 [facility]    ROTATOR CUFF REPAIR Left 04/22/2016    TONSILLECTOMY      TRANSESOPHAGEAL ECHOCARDIOGRAM  01/26/2020    TRANSLUMINAL ANGIOPLASTY Left 10/08/2019    with stenting, at [facility]    [address] Left 04/29/2020    of the [facility] re-occlusion    TUMOR EXCISION      benign tumors X 2 chest & axilla    UPPER GASTROINTESTINAL ENDOSCOPY  04/25/2013    BX barretts, HH, gastritis    UPPER GASTROINTESTINAL ENDOSCOPY  12/10/2015    UPPER GASTROINTESTINAL ENDOSCOPY  01/06/2017    Gastritis    VASCULAR SURGERY Left 12/08/2015    upper extremity and mesenteric angiogram (diagnostic only)    VASCULAR SURGERY Bilateral 07/07/2020    diagnostic lower extremity angiogram only    VASCULAR SURGERY Left 08/04/2020    angioplasty and stent of renal artery    VASCULAR SURGERY Left 08/05/2020    coil embolization of branch renal artery vessel for bleeding    VASCULAR SURGERY Left 09/01/2020    angioplasty and stenting of subclavian artery       Social:   Social History     Tobacco Use    Smoking status: Current Every Day Smoker     Packs/day: 1.00     Years: 30.00     Pack years: 30.00     Types: Cigarettes    Smokeless tobacco: Never Used   Substance Use Topics    Alcohol use: Not Currently     Comment: quit 2006      Family:   Family History   Problem Relation Age of Onset    Heart Disease Maternal Grandmother     Cancer Mother         lung and brain cancer    Cancer Maternal Aunt         lung and brain cancer ROS: Pertinent items are noted in HPI.     Objective:   Vital Signs:  Temp (24hrs), Av.1 °F (36.2 °C), Min:96.2 °F (35.7 °C), Max:97.7 °F (43.7 °C)     Systolic (22FWD), ZQH:880 , Min:102 , RD      Diastolic (30RFX), TMZ:12, Min:68, Max:78     Pulse  Av  Min: 68  Max: 94  /68   Pulse 85   Temp 97.5 °F (36.4 °C) (Oral)   Resp 16   Ht 5' 4.5\" (1.638 m)   Wt 251 lb 12.8 oz (114.2 kg)   LMP 10/24/2012   SpO2 98%   BMI 42.55 kg/m²      Physical Exam:   /68   Pulse 85   Temp 97.5 °F (36.4 °C) (Oral)   Resp 16   Ht 5' 4.5\" (1.638 m)   Wt 251 lb 12.8 oz (114.2 kg)   LMP 10/24/2012   SpO2 98%   BMI 42.55 kg/m²   General appearance: alert, appears stated age and cooperative  Lungs: clear to auscultation bilaterally  Chest wall: no tenderness  Heart: regular rate and rhythm, S1, S2 normal, no murmur, click, rub or gallop  Abdomen: abnormal findings:  tenderness mild in the upper abdomen  Extremities: extremities normal, atraumatic, no cyanosis or edema  Skin: Skin color, texture, turgor normal. No rashes or lesions  Neurologic: Grossly normal    Lab and Imaging Review   Recent Labs     21  1440 21  0520   WBC 6.9 6.0   HGB 8.5* 8.0*   MCV 81.2 82.3    179   * 134*   K 3.3* 2.8*   CL 95* 96*   CO2 26 21   BUN 24* 26*   CREATININE 2.3* 2.4*   GLUCOSE 203* 176*   CALCIUM 9.5 8.8   PROT 6.8 6.2*   LABALBU 3.6 3.3*   AST 19 15   ALT 33 25   ALKPHOS 305* 266*   BILITOT 1.9* 1.8*   LIPASE 740.0*  --    MG 1.80 2.00       Assessment:     Patient Active Problem List    Diagnosis Date Noted    Mitral valve regurgitation 2018    CAD (coronary artery disease)     Idiopathic acute pancreatitis 2021    Pancreatitis, unspecified pancreatitis type 2021    Acute kidney injury superimposed on CKD (HCC)     Acute encephalopathy     Generalized weakness 2021    Heart failure [facility] 2021    Cellulitis 2021    Closed nondisplaced fracture of navicular bone of left foot     Acute on chronic systolic CHF (congestive heart failure) ([address] 73/01/8052    Acute systolic CHF (congestive heart failure) ([address] 01/11/2021    CHF (congestive heart failure), NYHA class I, acute on chronic, combined ([address] 01/11/2021    DKA, type 2, not at goal [facility]) 12/21/2020    Moderate protein-calorie malnutrition ([address] 12/01/2020    Dyspnea on exertion     Ischemic heart disease     Hypokalemia 11/30/2020    CKD (chronic kidney disease) stage 4, GFR 15-29 ml/min ([facility])     Acute renal failure ([address] 08/05/2020    Morbid obesity with BMI of 40.0-44.9, adult ([address] 08/05/2020    PAD (peripheral artery disease) ([address]     Peripheral venous insufficiency 07/02/2020    Chronic renal impairment     Acute hyperkalemia 06/29/2020    Lower extremity edema 06/22/2020    Habitual self-excoriation 06/22/2020    Ulcer of left lower leg, limited to breakdown of skin ([address] 06/22/2020    Ulcer of right leg, limited to breakdown of skin ([address] 06/22/2020    Arthritis     Cardiomyopathy ([address]     Pulmonary hypertension ([address]     Acute on chronic congestive heart failure ([address] 02/28/2020    Pulmonary nodule 02/28/2020    Class 2 severe obesity due to excess calories with serious comorbidity and body mass index (BMI) of 39.0 to 39.9 in adult ([address] 02/28/2020    Bipolar disorder ([address] 02/28/2020    Chronic systolic congestive heart failure ([address] 02/21/2020    Dyslipidemia     Vitamin D deficiency 10/29/2019    SHAUNNA (acute kidney injury) ([address] 09/29/2019    COPD (chronic obstructive pulmonary disease) ([address] 05/17/2019    Acute hyponatremia 05/23/2018    Iron deficiency anemia 05/23/2018    GERD (gastroesophageal reflux disease)     Anxiety and depression     Type 2 diabetes mellitus with diabetic polyneuropathy, with long-term current use of insulin ([facility] 12/10/2015    CLINT (obstructive sleep apnea) 12/10/2015    Tobacco abuse disorder 12/10/2015    Abel's esophagus 04/29/2013    Essential hypertension 04/13/2011    Viral hepatitis C 03/10/2010     60year old female with a history of DM, HTN, HLD, COPD, CKD, CAD, atrial fibrillation, GERD, CHF, pulmonary HTN, Hep C and elevated LFTs, admitted w acute pancreatitis. CT confirmed acute pancreatitis and possible cirrhosis. MRCP in 2020 negative for obvious choledocholithiasis. TG 83. Cr 2.4, liver enzymes are nl. She is s/p CCY. Plan:   1. Supportive care  2. IVF controlled by primary team and Nephrology  3. Consider MRI vs EUS when acute pancreatitis resolves  4. Will order IgG-4  5.  Will follow    Renetta Aviles MD       [phone]) [phone]

## 2021-08-08 NOTE — PROGRESS NOTES
PM medications given; awaiting verification for some. Head to toe assessment started; patient has many skin issues. Tele showing NSR. IV infusing w/o diff. Hob up, call light in reach. Watching TV; denies pain at this time. Repositioned for comfort.

## 2021-08-08 NOTE — PROGRESS NOTES
Patient admitted to room _216___ from ER. Patient oriented to room, call light, bed rails, phone, lights and bathroom. Patient instructed about the schedule of the day including: vital sign frequency, lab draws, possible tests, frequency of MD and staff rounds, daily weights, I &O's and prescribed diet. Telemetry box in place, patient aware of placement and reason. Bed locked, in lowest position, side rails up 2/4, call light within reach. Recliner Assessment:     Patient is not able to demonstrate the ability to move from a reclining position to an upright position within the recliner due to weakness. 4 Eyes Skin Assessment     The patient is being assess for   Admission    I agree that 2 RN's have performed a thorough Head to Toe Skin Assessment on the patient. ALL assessment sites listed below have been assessed. Areas assessed for pressure by both nurses:   [x]   Head, Face, and Ears   [x]   Shoulders, Back, and Chest, Abdomen:  Small scab mid chest  [x]   Arms, Elbows, and Hands:  Skin tears both arms; scattered bruising    [x]   Coccyx, Sacrum, and Ischium:  Non blanchable redness buttocks  [x]   Legs, Feet, and Heels:  bilat lower legs red, swollen; multiple venous ulcers present; see wound assessments           **SHARE this note so that the co-signing nurse is able to place an eSignature**    Co-signer eSignature: Electronically signed by Linda Sheehan RN on 8/8/21 at 6:22 AM EDT    Does the Patient have Skin Breakdown related to pressure?   Yes LDA WOUND CARE was Initiated documentation include the Shaye-wound, Wound Assessment, Measurements, Dressing Treatment, Drainage, and Color\",          Vivek Prevention initiated:  Yes   Wound Care Orders initiated:  Yes      [id number]  nurse consulted for Pressure Injury (Stage 3,4, Unstageable, DTI, NWPT, Complex wounds)and New or Established Ostomies:  Yes      Primary Nurse eSignature: Electronically signed by Jurgen Booker RN on 8/8/21 at 1:13 AM EDT

## 2021-08-08 NOTE — PROGRESS NOTES
Alert; states she is having nausea and abd pain. Medicated as ordered. IV infusing. States that she plans to continue dialysis but yesterday she was feeling so ill she had to stop it. Call light in reach.

## 2021-08-08 NOTE — CONSULTS
Patient seen and examined, consult note dictated. Assessment and Plan:    1- ESRD: We will resume hemodialysis per [facility] schedule. 2- Hypokalemia: PRN potassium replacement. 3- HTN: Blood pressure within acceptable range. 4- Anemia: Stable hemoglobin, resume BELKIS during dialysis.   5- Pancreatitis: Management per primary team.

## 2021-08-08 NOTE — CONSULTS
[address]                 [address]                                  CONSULTATION    PATIENT NAME: Lilliam Ochoa                    :        1965  MED REC NO:   [id number]                          ROOM:       0216  ACCOUNT NO:   [de-identified]                           ADMIT DATE: 2021  PROVIDER:     Abdulkadir De La Garza MD    CONSULT DATE:  2021    REASON FOR CONSULTATION:  End-stage renal disease management. HISTORY OF PRESENT ILLNESS:  The patient is a 69-year-old   female patient with a history of end-stage renal disease who recently  started hemodialysis as an outpatient for volume control. The patient  presented to [facility] complaining of abdominal pain with  associated nausea and vomiting and was diagnosed with an acute  pancreatitis. Nephrology was consulted for further management of her  dialysis needs. PAST MEDICAL HISTORY:  1. End-stage renal disease. 2.  Atrial fibrillation. 3.  Coronary artery disease. 4.  COPD. 5.  Hypertension. 6.  Hyperlipidemia. 7.  Diabetes mellitus. PAST SURGICAL HISTORY:  1. Cardiac catheterization. 2.  Cholecystectomy. 3.  Coronary artery stent placement. 4.  Tonsillectomy. 5.  EGD. ALLERGIES:  The patient is allergic to LISINOPRIL. SOCIAL HISTORY:  The patient smokes one pack of cigarettes per day and  does not drink alcohol. FAMILY HISTORY:  Significant for coronary artery disease and lung and  brain cancer. REVIEW OF SYSTEMS:  The patient denied any fever, chills, cough, or  expectorations. Otherwise, a 10-point review of systems was relatively  unremarkable. PHYSICAL EXAMINATION:  VITAL SIGNS:  Blood pressure 102/68, heart rate 85, respirations 16,  temperature 97.5 Fahrenheit. The patient is sating 98% on room air. GENERAL APPEARANCE:  The patient is alert and oriented x3, not in acute  distress.   EYES:  Revealed normal conjunctivae, reactive pupils. NECK:  Revealed midline trachea. Nonpalpable thyroid. LUNGS:  Clear to anterior auscultation bilaterally. Nonlabored  breathing. CARDIOVASCULAR:  Revealed S1, S.  Regular rate and rhythm. No murmurs  or rubs. 1+ lower extremities edema. ABDOMEN:  Exam revealed an obese abdomen, soft and slightly tender to  palpation. SKIN:  Revealed no lesions or rashes. Warm to touch. PSYCHIATRIC:  Revealed good judgment and insight. LYMPHATICS:  Revealed no cervical or axillary adenopathies. ASSESSMENT AND PLAN:  1. End-stage renal disease. We will resume hemodialysis per Tuesday,  Thursday, and Saturday schedule. 2.  Hypokalemia, p.r.n. potassium replacement. 3.  Hypertension, blood pressure within acceptable range. 4.  Anemia, stable hemoglobin, resume erythropoietin-stimulating agent  during dialysis.   5.  Pancreatitis management per primary team and Shama Joens MD    D: 08/08/2021 14:40:24       T: 08/08/2021 14:43:35     [email or web address]  Job#: [id number]     Doc#: [id number]    CC:

## 2021-08-08 NOTE — PROGRESS NOTES
Patient unable to verify home medications. Remains NPO; reporting pain 8/10 at this time. Tele showing NSR; call light in reach.

## 2021-08-08 NOTE — PROGRESS NOTES
Shift report received from RAYMUNDO Bacon. Repositioned for comfort. Watching TV. Denies problems/concerns at this time. Call light in reach.

## 2021-08-09 PROBLEM — E11.8 DIABETES MELLITUS TYPE 2 WITH COMPLICATIONS (HCC): Status: ACTIVE | Noted: 2020-01-01

## 2021-08-09 NOTE — PROGRESS NOTES
Repositioned for comfort and pressure relief. Watching TV; states she is doing okay. Call light in reach.

## 2021-08-09 NOTE — PROGRESS NOTES
PROGRESS NOTE  S:55 yrs Patient  admitted on 8/7/2021 with Idiopathic acute pancreatitis [K85.00]  Pancreatitis, unspecified pancreatitis type [K85.90]  Acute pancreatitis without infection or necrosis, unspecified pancreatitis type [K85.90] . Today she complains of nausea, bloating, constipation, LUQ and epigastric abdominal pain and cramping. She is not hungry. Exam:   Vitals:    08/09/21 0721   BP:    Pulse:    Resp: 16   Temp:    SpO2: 99%      General appearance: alert, appears stated age, cooperative, no distress and morbidly obese  HEENT: Neck supple with midline trachea  Neck: no adenopathy and supple, symmetrical, trachea midline  Lungs: clear to auscultation bilaterally  Heart: regular rate and rhythm, S1, S2 normal, no murmur, click, rub or gallop  Abdomen: normal findings: bowel sounds normal, no masses palpable and symmetric and abnormal findings:  distended, obese and tenderness mild in the upper abdomen  Extremities: extremities normal, atraumatic, no cyanosis or edema     Medications: Reviewed    Labs:  CBC:   Recent Labs     08/07/21  1440 08/08/21  0520   WBC 6.9 6.0   HGB 8.5* 8.0*   HCT 27.4* 26.4*   MCV 81.2 82.3    179     BMP:   Recent Labs     08/07/21  1440 08/08/21  0520 08/09/21  0541   * 134* 131*   K 3.3* 2.8* 4.1   CL 95* 96* 98*   CO2 26 21 17*   BUN 24* 26* 33*   CREATININE 2.3* 2.4* 2.8*     LIVER PROFILE:   Recent Labs     08/07/21  1440 08/08/21  0520 08/09/21  0541   AST 19 15 33   ALT 33 25 29   LIPASE 740.0*  --   --    PROT 6.8 6.2* 6.8   BILITOT 1.9* 1.8* 1.9*   ALKPHOS 305* 266* 285*     Attending Supervising [de-identified] Attestation Statement  The patient is a 60 y.o. female. I have performed a history and physical examination of the patient. I discussed the case with my physician assistant Benji Perez PA-C    I reviewed the patient's Past Medical History, Past Surgical History, Medications, and Allergies. Physical Exam:  Vitals:    08/09/21 0240 08/09/21 0715 08/09/21 0721 08/09/21 1316   BP: 122/72 124/78  122/89   Pulse: 101 98  97   Resp: 16 16 16 16   Temp: 97.5 °F (36.4 °C) 97.9 °F (36.6 °C)  98 °F (36.7 °C)   TempSrc: Oral Oral  Oral   SpO2: 96% 97% 99% 99%   Weight:       Height:           Physical Examination: General appearance - alert, well appearing, and in no distress  Mental status - alert, oriented to person, place, and time  Eyes - pupils equal and reactive, extraocular eye movements intact  Neck - supple, no significant adenopathy  Chest - clear to auscultation, no wheezes, rales or rhonchi, symmetric air entry  Heart - normal rate, regular rhythm, normal S1, S2, no murmurs, rubs, clicks or gallops  Abdomen - soft, nontender, nondistended, no masses or organomegaly  Extremities - 2+edmea          Impression: 60year old female with a history of DM, HTN, HLD, COPD, CAD, atrial fibrillation, GERD, CHF, pulmonary HTN, ESRD on HD, Hep C and elevated LFTs, admitted w acute pancreatitis. CT confirmed acute pancreatitis and possible cirrhosis. Recommendation:  1. Continue supportive care  2. Monitor LFTs  3. Monitor and document output  4. NPO, IVF, pain control  5. Miralax daily prn  6. Await IgG 4  7. Will follow  8. Will consider EUS when acute pancreatitis resolves       Doris Gant PA-C  12:24 PM 8/9/2021                      60year old female with a history of DM, HTN, HLD, COPD, CKD, CAD, atrial fibrillation, GERD, CHF, pulmonary HTN and (TRUJILLO+congestive) cirrhosis  admitted with acute pancreatitis of unknown etiology. Recent MRCP negative for obvious choledocholithiasis     Continue supportive care. NPO, IVF, Pain control. Check TG levels and IgG4. Outpatient EUS in 4-6wks. Will follow.     Leny Abraham MD          [phone]  [phone]

## 2021-08-09 NOTE — ACP (ADVANCE CARE PLANNING)
Advance Care Planning   Healthcare Decision Maker:    Primary Decision Maker: Leandro Hough \"Lanny\" - Child - [phone]    Secondary Decision Maker: Dell Pham - Brother/Sister - [phone]    Click here to complete Healthcare Decision Makers including selection of the Healthcare Decision Maker Relationship (ie \"Primary\").

## 2021-08-09 NOTE — PROGRESS NOTES
Comprehensive Nutrition Assessment    Type and Reason for Visit:  Initial, Positive Nutrition Screen, Wound (+wounds; false + wounds; RD screen for weight loss and poor appetite/intake +hx of mod. PCM)    Nutrition Recommendations/Plan:   1. Continue NPO status and monitor nutrition progression  2. Monitor weight trends, bowel function, GI status, skin integrity, nutrition related lab values and POC. Nutrition Assessment:  patient is nutritionally compromised AEB NPO status x 2 days admission, recent hospital admissions [facility] 7/26-8/3, [facility] 7/2-7/14) + poor appetite/intake during recent admissions, N/V and abdominal pain x 2 days PTA, and she is at risk for further compromise d/t ongoing NPO status r/t  acute pancreatitis, ESRD on HD TTS, altered nutrition related lab values and hx of mod. PCM (12/20); Will continue NPO status and monitor nutrition plan of care    Malnutrition Assessment:  Malnutrition Status: At risk for malnutrition (Comment)    Context:  Chronic Illness     Findings of the 6 clinical characteristics of malnutrition:  Energy Intake:  7 - 75% or less estimated energy requirements for 1 month or longer  Weight Loss:  Unable to assess (d/t fluid accumulation)     Body Fat Loss:  No significant body fat loss     Muscle Mass Loss:  No significant muscle mass loss    Fluid Accumulation:  7 - Severe Extremities, Generalized (Generalized pitting, BLE + 3 pitting)   Strength:  Not Performed    Estimated Daily Nutrient Needs:  Energy (kcal):  6762-6331 kcals based on 30-33 kcals/kg/IBW; Weight Used for Energy Requirements:  Ideal     Protein (g):  72-90 g protein based on 1.2-1.5 g/kg/IBW;  Weight Used for Protein Requirements:  Ideal        Fluid (ml/day):  6862-6800 ml; Method Used for Fluid Requirements:  1 ml/kcal      Nutrition Related Findings:  met with pt at bedside; pt sitting on side of bed; pt reports loss of appetite/intake since her admission at [facility], the beginning of July, however she reports after being d[facility]'d [facility] beds [facility] the [facility] on 8/3, she was eating good prior to be readmitted on 8/7; pt reports since the beginning of July, she has eating much of anything; +N/V and abdominal pain PTA; pt reports she currently has no appetite; no recent weight loss, however difficult to assess d/t fluid accumulation; pt is a readmit; admission at [facility] [place] beds at the [facility] 7/26-8/3 + [facility] 7/2-7/14; pt has venous and diabetic ulcers of her lower extremities + skin tears on her left arm; Generalized pitting edema, +3 BLE edema;  Na and Cl are low; BUN, Creat, BG, alk phos, and bilirubin are elevated; GFR = 17; noted per MD note, CT confirms acute pancreatitis + possible cirrhosis; pt stated she was in NH for rehab, but prior to that she lives at home with friend +IPTA; no difficulty c/s +upper and lower dentures      Wounds:  Multiple, Diabetic Ulcer, Venous Stasis, Skin Tears       Current Nutrition Therapies:    Diet NPO Exceptions are: Ice Chips, Sips of Water with Meds    Anthropometric Measures:  · Height: 5' 4.5\" (163.8 cm)  · Current Body Weight: 251 lb 12.8 oz (114.2 kg) (obtained 8/7; weight method not specified)   · Admission Body Weight: 251 lb 12.8 oz (114.2 kg) (obtained 8/7; not specifed)    · Usual Body Weight: 255 lb 1.6 oz (115.7 kg) (obtained 8/12/20; standing scale)     · Ideal Body Weight: 123 lbs; % Ideal Body Weight 204.7 %   · BMI: 42.6  · BMI Categories: Obese Class 3 (BMI 40.0 or greater)       Nutrition Diagnosis:   · Inadequate oral intake related to inadequate protein-energy intake, altered GI function, renal dysfunction as evidenced by NPO or clear liquid status due to medical condition, poor intake prior to admission, localized or generalized fluid accumulation, dialysis, lab values, GI abnormality, nausea, vomiting, other (comment) (ESRD on HD, acute pancreatitis)    Nutrition Interventions:   Food and/or Nutrient Delivery:  Continue NPO  Nutrition Education/Counseling:  No recommendation at this time   Coordination of Nutrition Care:  Continue to monitor while inpatient    Goals:  patient will adhere to NPO status until medically cleared to receive po nutrition therapy       Nutrition Monitoring and Evaluation:   Behavioral-Environmental Outcomes:  None Identified   Food/Nutrient Intake Outcomes:  Diet Advancement/Tolerance  Physical Signs/Symptoms Outcomes:  Biochemical Data, GI Status, Nausea or Vomiting, Fluid Status or Edema, Hemodynamic Status, Nutrition Focused Physical Findings, Skin, Weight     Discharge Planning:     Too soon to determine     Electronically signed by Cristo Gifford RD, LD on 8/9/21 at 6:36 PM EDT    Contact: [phone]

## 2021-08-09 NOTE — PROGRESS NOTES
See PM shift assessment. Alert to verbal; states that she is still very tired. IV's infusing without diff; call light in reach.

## 2021-08-09 NOTE — PROGRESS NOTES
Progress Note    HISTORY     CC:   Abdominal Pain               We are following for ESRD      Subjective/   HPI:  Not having issues with dialysis. Somnolent today. Still not eating much. BP stable      ROS:  Constitutional:  No fevers, No Chills, + weakness  Cardiovascular:  No palpations, + edema  Respiratory:  No wheezing, no cough  Skin:  No rash, no itching    Social Hx:  No Family at the bedside     Past Medical and Surgical History:  - Reviewed, no changes     EXAM       Objective/     Vitals:    08/09/21 0240 08/09/21 0715 08/09/21 0721 08/09/21 1316   BP: 122/72 124/78  122/89   Pulse: 101 98  97   Resp: 16 16 16 16   Temp: 97.5 °F (36.4 °C) 97.9 °F (36.6 °C)  98 °F (36.7 °C)   TempSrc: Oral Oral  Oral   SpO2: 96% 97% 99% 99%   Weight:       Height:         24HR INTAKE/OUTPUT:      Intake/Output Summary (Last 24 hours) at 8/9/2021 1629  Last data filed at 8/9/2021 1330  Gross per 24 hour   Intake 1395.58 ml   Output    Net 1395.58 ml     Constitutional:  Somnolent, NAD      MEDICAL DECISION MAKING       Data/  Recent Labs     08/07/21  1440 08/08/21  0520   WBC 6.9 6.0   HGB 8.5* 8.0*   HCT 27.4* 26.4*   MCV 81.2 82.3    179     Recent Labs     08/07/21  1440 08/08/21  0520 08/09/21  0541   * 134* 131*   K 3.3* 2.8* 4.1   CL 95* 96* 98*   CO2 26 21 17*   GLUCOSE 203* 176* 172*   MG 1.80 2.00  --    BUN 24* 26* 33*   CREATININE 2.3* 2.4* 2.8*   LABGLOM 22* 21* 17*   GFRAA 26* 25* 21*       Assessment/     ESRD:  Schedule TTS, recent start due to more of a CKD 4 with cardiorenal syndrome picture which required multiple admissions for volume management. Fluid removal somewhat limited by low BP    Systolic Heart Failure:  - Chronic, EF = 25%  - Chronic significant edema. Mild progress since starting HD    CAD:  Stable. Prior CABG. Low EF.   On room air    Abdominal Pain:    - Etiology:  Pancreatitis     Anemia of chronic disease-CKD:  - Multiple admission, some hemodilution Plan/     HD tomorrow  Monitor how she tolerates UF with albumin  Might benefit from daily treatments until we get her euvolemic  -----------------------------  Naye Cortés M.D.   [facility]

## 2021-08-09 NOTE — CARE COORDINATION
Case Management Assessment  Initial Evaluation      Patient Name: Juan Daniel Sinclair  YOB: 1965  Diagnosis: Idiopathic acute pancreatitis [K85.00]  Pancreatitis, unspecified pancreatitis type [K85.90]  Acute pancreatitis without infection or necrosis, unspecified pancreatitis type [K85.90]  Date / Time: 8/7/2021  2:19 PM    Admission status/Date: INPT 8/7/21  Chart Reviewed: Yes      Patient Interviewed: Yes   Family Interviewed:  No      Hospitalization in the last 30 days:  No      Health Care Decision Maker :   Primary Decision Maker: Gladies Kocher \"Lanny\" - Child - [phone]    Secondary Decision Maker: Trent Owens - Brother/Sister - [phone]    (CM - must 1st enter selection under Navigator - emergency contact- [place] Relationship and pick relationship)   Who do you trust or have selected to make healthcare decisions for you      Met with: pt via telephone  Interview conducted  (bedside/phone):    Current PCP: [address] required for SNF : Y, N          3 night stay required - Y, N    ADLS  Support Systems/Care Needs: Friends/Neighbors, Children  Transportation: self    Meal Preparation: self    Housing  Living Arrangements: home with friend  Steps: 5  Intent for return to present living arrangements: Yes  Identified Issues: -    Home Care Information  Active with [address] : No Agency:(Services)  Type of Home Care Services: None  Passport/Waiver : No  :                      Phone Number:    Passport/Waiver Services: n/a          Durable Medical Equiptment   DME Provider: n/a  Equipment:   Walker_X__Cane_X__RTS___ BSC___Shower Chair_X__Hospital Bed___W/C____Other________  02 at ____Liter(s)---wears(frequency)_______ HHN ___ CPAP___ BiPap___   N/A____      Home O2 Use :  No    If No for home O2---if presently on O2 during hospitalization:  No  if yes CM to follow for potential DC O2 need  Informed of need for

## 2021-08-09 NOTE — PROGRESS NOTES
Am assessment completed. See flowsheet. Pt denies needs at this time. Call light within reach. Rj Edmonds RN\

## 2021-08-09 NOTE — PLAN OF CARE
Nutrition Problem #1: Inadequate oral intake  Intervention: Food and/or Nutrient Delivery: Continue NPO  Nutritional Goals: patient will adhere to NPO status until medically cleared to receive po nutrition therapy]

## 2021-08-09 NOTE — PROGRESS NOTES
Stated that she felt she needed to void but once standing to ambulate to RR patient changed her mind. Sitting on side of the bed. States that she can get herself back into bed. Reinforced need to call for assistance if she needs to get up; voiced understanding. Call light in reach.

## 2021-08-09 NOTE — PROGRESS NOTES
 sodium chloride Stopped (08/09/21 0144)     nitroGLYCERIN, glucose, dextrose, glucagon (rDNA), dextrose, sodium chloride flush, sodium chloride, ondansetron **OR** ondansetron, acetaminophen **OR** acetaminophen, morphine    Lab Data:  Recent Labs     08/07/21  1440 08/08/21  0520   WBC 6.9 6.0   HGB 8.5* 8.0*   HCT 27.4* 26.4*   MCV 81.2 82.3    179     Recent Labs     08/07/21  1440 08/08/21  0520 08/09/21  0541   * 134* 131*   K 3.3* 2.8* 4.1   CL 95* 96* 98*   CO2 26 21 17*   BUN 24* 26* 33*   CREATININE 2.3* 2.4* 2.8*     Recent Labs     08/07/21  1440   TROPONINI 0.04*       Coagulation:   Lab Results   Component Value Date    INR 1.58 07/29/2021    APTT 35.2 07/29/2021     Cardiac markers:   Lab Results   Component Value Date    CKTOTAL 86 06/27/2021    TROPONINI 0.04 08/07/2021         Lab Results   Component Value Date    ALT 29 08/09/2021    AST 33 08/09/2021     (H) 10/14/2020    ALKPHOS 285 (H) 08/09/2021    BILITOT 1.9 (H) 08/09/2021       Lab Results   Component Value Date    INR 1.58 (H) 07/29/2021    INR 2.53 (H) 07/28/2021    INR 3.75 (H) 07/27/2021    PROTIME 18.2 (H) 07/29/2021    PROTIME 29.7 (H) 07/28/2021    PROTIME 44.8 (H) 07/27/2021       Radiology    Chest xray Mild pulmonary vascular congestion. Stable mild cardiomegaly. Ct abd    1. Acute pancreatitis. 2. Cirrhosis. 3. Trace ascites. 4. Trace bilateral pleural effusions. Assessment & Plan:      Acute pancreatitis  - etiology unclear - no alcohol use, no obstructive pathology.  TGL normal  - conservative mx with NPO, IVF , pain control  - GI consulted    ESRD - recently initiated on HD  Massive peripheral edema slightly improved from last adm    -   Chronic sCHF  - Last EF 25% 8/2020.    -diureitics as tolerated, pt on HD    - Not on ACE/ARB given ESRD     CAD s/p CABG, stents.    - Elevated troponin 2/2 increased demand and chronic kidney disease.  - resumed ASA, statin      History of PAF  - Currently in NSR    -resumed  Eliquis     Type 2 diabetes  - Controlled  - Placed on sliding scale insulin  - BG are  stable     COPD  - Stable  - Continue inhalers.     - Anemia secondary to chronic kidney disease.  - monitored     PPI and eliquis  Consider more fluid removal during HD     Beka Woods MD, 8/9/2021 7:55 AM

## 2021-08-09 NOTE — PROGRESS NOTES
Am assessment completed. See flowsheet. Medicated with morphine for abdominal pain. See mar. Elgin Bangura RN\

## 2021-08-09 NOTE — CONSULTS
[address] [id number] Continence Nurse  Consult Note       NAME:  Lokesh Bautista  MEDICAL RECORD NUMBER:  [id number]  AGE: 60 y.o. GENDER: female  : 1965  TODAY'S DATE:  2021    Subjective: I take blood thinners and I am a  when I am bored. Reason for WOCN Evaluation and Assessment: L Lower Leg Venous Ulcers; L Foot Toes 1-4 diabetic dry eschar; scattered scabs and open areas d/t picking at skin      Dulce Nolan is a 60 y.o. female referred by:   [] Physician  [x] Nursing  [] Other:     Wound Identification:  Wound Type: venous and diabetic  Contributing Factors: venous stasis, diabetes, chronic pressure, decreased mobility, shear force, obesity and substance abuse    Wound History: The patient is a 60 y.o. female who presents to [address] [place] [address] with abd pain. Pt states that she developed abd pain - mid abd, radiating to back a few days ago, yesterday, abd pain was associated with nausea/vomiting and hence presented to the ER. Found to have acute pancreatitis.  Denies ETOH abuse, states she has had her GB surgery in the past.  Current Wound Care Treatment: L Lower Leg - alginate ag, abd pad, roll gauze; L Foot Toes 1-4 - betadine    Patient Goal of Care:  [x] Wound Healing  [] Odor Control  [] Palliative Care  [] Pain Control   [] Other:         PAST MEDICAL HISTORY        Diagnosis Date    Acute blood loss anemia 2020    Acute kidney injury ([address] 2019    Acute kidney injury superimposed on CKD ([address] 2020    Acute on chronic combined systolic and diastolic congestive heart failure ([address] 1/10/2020    Acute on chronic right-sided heart failure ([address] 2020    Alcohol dependence ([address] 3/10/2010    Angina pectoris (HCC)     Arthritis     Asthma     Atrial fibrillation ([address]     CAD (coronary artery disease)     Cardiomyopathy ([address]     Cellulitis 6/3/2020    Charcot's joint of ankle, left     Chronic kidney disease     [facility]     Diabetic ulcer of left foot associated with type 2 diabetes mellitus, limited to breakdown of skin ([facility] 1/18/2020    Diabetic ulcer of toe of right foot associated with type 2 diabetes mellitus ([facility] 1/18/2020    Enthesopathy     GERD (gastroesophageal reflux disease)     Hyperlipidemia     Hypertension     Left renal artery stenosis ([facility] 08/2020    MI (myocardial infarction) ([facility] 10/07/2019    NSTEMI    Morbid obesity (HCC)     Osteoarthritis     Peripheral neuropathy     Peripheral vascular disease (HCC)     Polyarthritis     Positive FIT (fecal immunochemical test) 2/28/2020    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 steal syndrome 09/01/2020    Traumatic perinephric hematoma, left, initial encounter 8/4/2020       PAST SURGICAL HISTORY    Past Surgical History:   Procedure Laterality Date    ARTERIAL BYPASS SURGRY Left 01/06/2016    Axillary/Subclavian to Brachial Bypass w/reversed SVG    CARDIAC CATHETERIZATION  03/27/2018    Dr. Getachew Guerra - at [facility]  01/20/2020    Dr. Kareen Coleman      pancreatitis post surgery    CORONARY ANGIOPLASTY WITH STENT PLACEMENT  03/16/2018    MELODY- 3.5 x 38 and 3.5 x 20 to Cx    CORONARY ANGIOPLASTY WITH STENT PLACEMENT  01/03/2018    MELODY- 3.0 x 38 and 2.75 x 26 and 2.75 x 8 and 2.75 x 22 to the LAD    CORONARY ANGIOPLASTY WITH STENT PLACEMENT  10/07/2019    MELODY- 2.5 x 8 to [address] GRAFT N/A 01/27/2020    CORONARY ARTERY BYPASS GRAFTING X 1, ON PUMP BEATING HEART, INTERNAL MAMMARY ARTERY TO LEFT ANTERIOR DESCENDING ARTERY, VAS CATH INSERTION, URI, PLATELET GEL APPLICATION, 5 LEVEL BILATERAL INTERCOSTAL NERVE BLOCK, STERNAL PLATING performed by Kristina Gonzalez MD at [address] Right 05/16/2019    fem-pop, performed by Awilda Mcgee MD at [address]  02/14/2019    CardioMEMs insertion for CHF [id number]  Serial #[id number]  MRI Conditional    IR NONTUNNELED VASCULAR CATHETER  07/09/2021    IR NONTUNNELED VASCULAR CATHETER 7/9/2021 [facility]    IR TUNNELED CATHETER PLACEMENT GREATER THAN 5 YEARS  7/29/2021    IR TUNNELED CATHETER PLACEMENT GREATER THAN 5 YEARS 7/29/2021 [facility]    ROTATOR CUFF REPAIR Left 04/22/2016    TONSILLECTOMY      TRANSESOPHAGEAL ECHOCARDIOGRAM  01/26/2020    TRANSLUMINAL ANGIOPLASTY Left 10/08/2019    with stenting, at [facility]    TRANSLUMINAL ANGIOPLASTY Left 04/29/2020    of the [facility] re-occlusion    TUMOR EXCISION      benign tumors X 2 chest & axilla    UPPER GASTROINTESTINAL ENDOSCOPY  04/25/2013    BX barretts, HH, gastritis    UPPER GASTROINTESTINAL ENDOSCOPY  12/10/2015    UPPER GASTROINTESTINAL ENDOSCOPY  01/06/2017    Gastritis    VASCULAR SURGERY Left 12/08/2015    upper extremity and mesenteric angiogram (diagnostic only)    VASCULAR SURGERY Bilateral 07/07/2020    diagnostic lower extremity angiogram only    VASCULAR SURGERY Left 08/04/2020    angioplasty and stent of renal artery    VASCULAR SURGERY Left 08/05/2020    coil embolization of branch renal artery vessel for bleeding    VASCULAR SURGERY Left 09/01/2020    angioplasty and stenting of subclavian artery       FAMILY HISTORY    Family History   Problem Relation Age of Onset    Heart Disease Maternal Grandmother     Cancer Mother         lung and brain cancer    Cancer Maternal Aunt         lung and brain cancer       SOCIAL HISTORY    Social History     Tobacco Use    Smoking status: Current Every Day Smoker     Packs/day: 1.00     Years: 30.00     Pack years: 30.00     Types: Cigarettes    Smokeless tobacco: Never Used   Vaping Use    Vaping Use: Never used   Substance Use Topics    Alcohol use: Not Currently     Comment: quit 2006     Drug use: Never       ALLERGIES    Allergies   Allergen Reactions    Lisinopril Other (See Comments)     Severe hypotension MEDICATIONS    No current facility-administered medications on file prior to encounter. Current Outpatient Medications on File Prior to Encounter   Medication Sig Dispense Refill    hydrOXYzine (VISTARIL) 25 MG capsule Take 10 mg by mouth 3 times daily as needed for Itching      metoprolol tartrate (LOPRESSOR) 25 MG tablet Take 0.5 tablets by mouth 2 times daily      torsemide (DEMADEX) 100 MG tablet Take 1 tablet by mouth daily      polyethylene glycol (GLYCOLAX) 17 g packet Take 17 g by mouth daily      b complex-C-folic acid (NEPHROCAPS) 1 MG capsule Take 1 capsule by mouth daily      midodrine (PROAMATINE) 10 MG tablet Take 1 tablet by mouth 3 times daily (with meals)      pantoprazole (PROTONIX) 40 MG tablet Take 1 tablet by mouth daily      acetaminophen (TYLENOL) 650 MG extended release tablet Take 650 mg by mouth every 4 hours as needed for Pain      Umeclidinium Bromide (INCRUSE ELLIPTA) 62.5 MCG/INH AEPB Inhale 1 Dose into the lungs daily      fluticasone-salmeterol (ADVAIR) 250-50 MCG/DOSE AEPB Inhale 1 puff into the lungs 2 times daily      apixaban (ELIQUIS) 5 MG TABS tablet Take 1 tablet by mouth 2 times daily 60 tablet     miconazole (MICOTIN) 2 % powder Apply topically 2 times daily. 45 g 1    Insulin Degludec (TRESIBA FLEXTOUCH) 100 UNIT/ML SOPN Inject 3 Units into the skin nightly 10 pen 5    Blood Glucose Monitoring Suppl (ONE TOUCH ULTRA 2) w/Device KIT USE TO MONITOR BLOOD GLUCOSE LEVELS 1 kit 0    ONE TOUCH ULTRASOFT LANCETS MISC USE TO TEST BLOOD GLUCOSE LEVELS 4 TIMES DAILY 150 each 3    blood glucose test strips (ASCENSIA AUTODISC VI;ONE TOUCH ULTRA TEST VI) strip USE TO MONITOR BLOOD GLUCOSE LEVELS 4 TIMES DAILY 150 each 3    nitroGLYCERIN (NITROSTAT) 0.4 MG SL tablet Place 1 tablet under the tongue every 5 minutes as needed for Chest pain up to max of 3 total doses.  If no relief after 1 dose, call 911. 25 tablet 0    Insulin Pen Needle (B-D ULTRAFINE III SHORT regurgitation I34.0    COPD (chronic obstructive pulmonary disease) ([facility]) J44.9    SHAUNNA (acute kidney injury) ([facility]) N17.9    Vitamin D deficiency E55.9    Dyslipidemia E78.5    Abel's esophagus K22.70    Acute on chronic congestive heart failure ([facility]) I50.9    Viral hepatitis C B19.20    Pulmonary nodule R91.1    Class 2 severe obesity due to excess calories with serious comorbidity and body mass index (BMI) of 39.0 to 39.9 in adult ([facility]) E66.01, Z68.39    Bipolar disorder ([facility]) F31.9    Pulmonary hypertension ([facility]) I27.20    Lower extremity edema R60.0    Habitual self-excoriation F42.4    Ulcer of left lower leg, limited to breakdown of skin ([facility]) L97.921    Ulcer of right leg, limited to breakdown of skin ([facility] L97.911    Arthritis M19.90    Cardiomyopathy ([facility]) I42.9    Chronic systolic congestive heart failure ([facility]) I50.22    Acute hyperkalemia E87.5    Chronic renal impairment N18.9    Peripheral venous insufficiency I87.2    PAD (peripheral artery disease) ([facility]) I73.9    Acute renal failure ([facility]) N17.9    Morbid obesity with BMI of 40.0-44.9, adult ([facility]) E66.01, Z68.41    CKD (chronic kidney disease) stage 4, GFR 15-29 ml/min ([facility]) N18.4    Hypokalemia E87.6    Dyspnea on exertion R06.00    Ischemic heart disease I25.9    Moderate protein-calorie malnutrition ([facility]) E44.0    Diabetes mellitus type 2 with complications ([facility]) G06.6    Acute on chronic systolic CHF (congestive heart failure) ([facility]) J66.90    Acute systolic CHF (congestive heart failure) ([facility]) I50.21    CHF (congestive heart failure), NYHA class I, acute on chronic, combined ([facility]) I50.43    Cellulitis L03.90    Closed nondisplaced fracture of navicular bone of left foot S92.255A    Heart failure ([facility]) I50.9    Generalized weakness R53.1    Acute encephalopathy G93.40    Acute kidney injury superimposed on CKD (HCC) N17.9, N18.9    Idiopathic acute pancreatitis K85.00    Acute pancreatitis without infection or necrosis K85.90    ESRD (end stage renal disease) on dialysis ([facility] N18.6, Z99.2       Measurements:  Wound 01/11/21 Chest (Active)   Number of days: 209       Wound 04/22/21 Leg Left; Lower; Anterior; Lateral wounds in clusters. (Active)   Wound Image   07/06/21 1444   Wound Etiology Venous 07/10/21 0927   Dressing Status New dressing applied;Clean;Dry; Intact 07/14/21 0802   Wound Cleansed Cleansed with saline 07/14/21 0802   Dressing/Treatment Alginate with Ag;Roll gauze; Ace wrap 07/14/21 0802   Dressing Change Due 07/07/21 07/07/21 0836   Wound Length (cm) 15 cm 07/06/21 1444   Wound Width (cm) 9 cm 07/06/21 1444   Wound Depth (cm) 0.6 cm 07/06/21 1444   Wound Surface Area (cm^2) 135 cm^2 07/06/21 1444   Change in Wound Size % (l*w) 37.06 07/06/21 1444   Wound Volume (cm^3) 81 cm^3 07/06/21 1444   Wound Healing % 37 07/06/21 1444   Distance Tunneling (cm) 0 cm 07/07/21 0836   Tunneling Position ___ O'Clock 0 07/07/21 0836   Undermining Starts ___ O'Clock 0 07/07/21 0836   Undermining Ends___ O'Clock 0 07/07/21 0836   Undermining Maxium Distance (cm) 0 07/07/21 0836   Wound Assessment Other (Comment) 07/11/21 2006   Drainage Amount None 07/14/21 0802   Drainage Description Serosanguinous; Serous 07/07/21 0836   Odor Mild 07/07/21 0836   Shaye-wound Assessment Edematous; Blanchable erythema 07/07/21 0836   Margins Attached edges; Defined edges 07/07/21 0836   Wound Thickness Description not for Pressure Injury Full thickness 07/07/21 0836   Number of days: 109    L Lower Leg:             Wound 04/22/21 Toe (Comment  which one) Left cluster tip of 1 + 2, dorsal on toe 3 brown and yellow slough. (Active)   Wound Image   08/08/21 0053   Wound Etiology Diabetic 08/09/21 1217   Dressing Status Dry; Intact 08/09/21 1217   Wound Cleansed Cleansed with saline 08/09/21 1217   Dressing/Treatment Open to air;Betadine swabs/povidone iodine 08/09/21 1217   Dressing Change Due 08/10/21 08/09/21 1217   Wound Assessment Eschar dry 08/09/21 1217   Drainage Amount None 08/09/21 1217   Odor None 08/09/21 1217   Shaye-wound Assessment Dry/flaky; Hyperkeratosis (callous) 08/09/21 1217   Margins Attached edges; Defined edges 08/09/21 1217   Number of days: 109    L Foot Toes 1-4:         Wound 04/22/21 Leg Right (Active)   Number of days: 109       Wound 07/06/21 Radial Distal;Left skin tear, steri strips applied (Active)   Dressing Status New dressing applied 07/14/21 0802   Wound Cleansed Cleansed with saline 07/14/21 0802   Dressing/Treatment Alginate with Ag;Roll gauze 07/14/21 0802   Dressing Change Due 07/11/21 07/10/21 0927   Wound Assessment Dry 07/14/21 0802   Drainage Amount None 07/14/21 0802   Number of days: 34       Wound 07/29/21 Left;Right; Outer; Lower cluster of venous statis wounds healing (dry and intact) (Active)   Wound Image    08/09/21 1217   Wound Etiology Venous 08/09/21 1217   Dressing Status New drainage noted;New dressing applied 08/09/21 1217   Wound Cleansed Cleansed with saline 08/09/21 1217   Dressing/Treatment Alginate with Ag;ABD;Roll gauze 08/09/21 1217   Dressing Change Due 08/10/21 08/09/21 1217   Wound Assessment Pink/red 08/09/21 1217   Drainage Amount Moderate 08/09/21 1217   Drainage Description Serous 08/09/21 1217   Odor None 08/09/21 1217   Shaye-wound Assessment Edematous; Hemosiderin staining (brown yellow) 08/09/21 1217   Margins Defined edges 08/09/21 1217   Number of days: 11       Wound 08/08/21 Radial Left; Anterior;Posterior scattered small skin tears; arm is bruised as well (Active)   Wound Image   08/08/21 0053   Dressing Status Clean;Dry;New drainage noted 08/08/21 2000   Wound Cleansed Wound cleanser 08/08/21 0053   Dressing/Treatment Xeroform;ABD;Roll gauze 08/08/21 0053   Wound Assessment Dry;Pink/red;Bleeding 08/08/21 0053   Drainage Amount Small 08/08/21 0053   Drainage Description Other (Comment) 08/08/21 0053   Odor None 08/08/21 0053   Shaye-wound Assessment Fragile 08/08/21 0053   Number of days: 1     Response to treatment:  Well tolerated by patient. Pain Assessment:  Severity:  0 / 10  Quality of pain: N/A  Wound Pain Timing/Severity: none  Premedicated: No    Plan   Plan of Care: Wound 04/22/21 Toe (Comment  which one) Left cluster tip of 1 + 2, dorsal on toe 3 brown and yellow slough. -Dressing/Treatment: Open to air, Betadine swabs/povidone iodine  Wound 07/29/21 Left;Right; Outer; Lower cluster of venous statis wounds healing (dry and intact)-Dressing/Treatment: Alginate with Ag, ABD, Roll gauze  Wound 08/08/21 Radial Left; Anterior;Posterior scattered small skin tears; arm is bruised as well-Dressing/Treatment: Xeroform, ABD, Roll gauze   Recommendation: L Foot Toes 1-4 - clean with NS, dry, paint with betadine daily  L Lower Leg - clean with NS, dry, alginate ag, abd pads, roll gauze, change daily  When in bed or up in chair elevate legs  Call Wound Care for deterioration     Specialty Bed Required : No   [] Low Air Loss   [] Pressure Redistribution  [] Fluid Immersion  [] Bariatric  [] Total Pressure Relief  [] Other:     Current Diet: Diet NPO Exceptions are: Ice Chips, Sips of Water with Meds  Dietician consult:  No    Discharge Plan:  Placement for patient upon discharge: home with support    Patient appropriate for Outpatient [address]: Yes    Referrals:  [x]   [] [facility]  [] Supplies  [] Other    Patient/Caregiver Teaching:  Level of patient/caregiver understanding able to:   [] Indicates understanding       [] Needs reinforcement  [] Unsuccessful      [] Verbal Understanding  [] Demonstrated understanding       [] No evidence of learning  [] Refused teaching         [] N/A       Electronically signed by Jak Almeida RN, CWOCN on 8/9/2021 at 12:21 PM

## 2021-08-10 NOTE — FLOWSHEET NOTE
Treatment time: 3 hours  Net UF: 2900ml    Pre weight: 112.0kg  Post weight: 108.9kg  EDW: N/A    Access used: Left chest wall tunneled catheter  Access function: Good    Medications or blood products given: N/A    Regular outpatient schedule: TTS    Summary of response to treatment: Pt tolerated treatment very well, vital signs stable throughout treatment. Albumin given per order for blood pressure support. Total net 2900ml removed. 08/10/21 0831 08/10/21 1134   Treatment   Time On 0831  --    Time Off 1134  --    Vital Signs   /66 (!) 109/55   Temp 98 °F (36.7 °C) 97.7 °F (36.5 °C)   Pulse 94 102   Resp 20 20   Weight 246 lb 14.6 oz (112 kg) 240 lb 1.3 oz (108.9 kg)   Treatment Initiation   Dialyze Hours 3  --    Dialysis Bath   K+ (Potassium) 3  --    Ca+ (Calcium) 2.5  --    Na+ (Sodium) 138  --    HCO3 (Bicarb) 35  --    Post-Hemodialysis Assessment   Rinseback Volume (ml)  --  400 ml   Total Liters Processed (l/min)  --  66.9 l/min   Duration of Treatment (minutes)  --  180 minutes   Hemodialysis Intake (ml)  --  600 ml   Hemodialysis Output (ml)  --  3500 ml   NET Removed (ml)  --  2900 ml   Tolerated Treatment  --  Good       Copy of dialysis treatment record placed in chart, to be scanned into EMR.     Electronically signed by Virgilio Salamanca RN on 8/10/2021 at 12:08 PM

## 2021-08-10 NOTE — PROGRESS NOTES
PROGRESS NOTE  S:55 yrs Patient  admitted on 8/7/2021 with Idiopathic acute pancreatitis [K85.00]  Pancreatitis, unspecified pancreatitis type [K85.90]  Acute pancreatitis without infection or necrosis, unspecified pancreatitis type [K85.90] . Today she feels well. She admits to constipation, and denies abdominal pain. Exam:   Vitals:    08/10/21 0831   BP: 107/66   Pulse: 94   Resp: 20   Temp: 98 °F (36.7 °C)   SpO2:       General appearance: alert, appears older than stated age, cooperative, icteric and syndromic appearance - chronically ill appearing  HEENT: Neck supple with midline trachea  Neck: no adenopathy and supple, symmetrical, trachea midline  Lungs: clear to auscultation bilaterally  Heart: regular rate and rhythm, S1, S2 normal, no murmur, click, rub or gallop  Abdomen: normal findings: bowel sounds normal, no masses palpable, soft, non-tender and symmetric and abnormal findings:  distended and obese  Extremities: extremities normal, atraumatic, no cyanosis or edema     Medications: Reviewed    Labs:  CBC:   Recent Labs     08/07/21  1440 08/08/21  0520   WBC 6.9 6.0   HGB 8.5* 8.0*   HCT 27.4* 26.4*   MCV 81.2 82.3    179     BMP:   Recent Labs     08/08/21  0520 08/09/21  0541 08/10/21  0619   * 131* 125*   K 2.8* 4.1 4.1   CL 96* 98* 93*   CO2 21 17* 18*   BUN 26* 33* 40*   CREATININE 2.4* 2.8* 3.4*     LIVER PROFILE:   Recent Labs     08/07/21  1440 08/07/21  1440 08/08/21  0520 08/09/21  0541 08/10/21  0619   AST 19   < > 15 33 26   ALT 33   < > 25 29 25   LIPASE 740.0*  --   --   --   --    PROT 6.8   < > 6.2* 6.8 6.8   BILITOT 1.9*   < > 1.8* 1.9* 2.0*   ALKPHOS 305*   < > 266* 285* 277*    < > = values in this interval not displayed. Attending Supervising [de-identified] Attestation Statement  The patient is a 60 y.o. female. I have performed a history and physical examination of the patient.  I discussed the case with my physician assistant Davi GRIFFIN I reviewed the patient's Past Medical History, Past Surgical History, Medications, and Allergies. Physical Exam:  Vitals:    08/10/21 0831 08/10/21 1134 08/10/21 1223 08/10/21 1411   BP: 107/66 (!) 109/55 93/68 100/62   Pulse: 94 102 102 95   Resp: 20 20 19 20   Temp: 98 °F (36.7 °C) 97.7 °F (36.5 °C) 96.9 °F (36.1 °C) 96.7 °F (35.9 °C)   TempSrc:   Oral Oral   SpO2:   100% 98%   Weight: 246 lb 14.6 oz (112 kg) 240 lb 1.3 oz (108.9 kg)     Height:           Physical Examination: General appearance - alert, well appearing, and in no distress  Mental status - alert, oriented to person, place, and time  Eyes - pupils equal and reactive, extraocular eye movements intact  Neck - supple, no significant adenopathy  Chest - clear to auscultation, no wheezes, rales or rhonchi, symmetric air entry  Heart - normal rate, regular rhythm, normal S1, S2, no murmurs, rubs, clicks or gallops  Abdomen - soft, nontender, nondistended, no masses or organomegaly  Extremities - 2+ pedal edema noted            Impression: 60year old female with a history of DM, HTN, HLD, COPD, CKD, CAD, atrial fibrillation, GERD, CHF, pulmonary HTN and (TRUJILLO+congestive) cirrhosis  admitted with acute pancreatitis of unknown etiology. Recent MRCP negative for obvious choledocholithiasis. Recommendation:  1. Continue supportive care  2. Monitor LFTs  3. Monitor and document output  4. Transition to PO pain control  5. Miralax daily   6. Await IgG 4  7. Start clear liquid diet as tolerated   8. Slowly advance to low fiber diet as abdominal pain improves  9. Will follow  Will schedule EUS in 4-6 weeks upon d/c     Vinicius Mauricio PA-C  11:04 AM 8/10/2021                      60year old female with a history of DM, HTN, HLD, COPD, CKD, CAD, atrial fibrillation, GERD, CHF, pulmonary HTN and (TRUJILLO+congestive) cirrhosis  admitted with acute pancreatitis of unknown etiology.  Recent MRCP negative for obvious choledocholithiasis     Continue supportive care. IVF, Pain control. Start clear diet and advance as tolerated. Check TG levels and IgG4. Outpatient EUS in 4-6wks. Will follow.     Corrine Daniels MD          [phone]  [phone]

## 2021-08-10 NOTE — FLOWSHEET NOTE
08/10/21 1605   Encounter Summary   Services provided to: Patient   Referral/Consult From: Rounding   Continue Visiting   (8/10: hoping to eat soon, prayer, Livan background)   Complexity of Encounter Moderate   Length of Encounter 15 minutes   Spiritual Assessment Completed Yes   Spiritual/Gnosticism   Type Spiritual support   Assessment Approachable   Intervention Nurtured hope; Active listening;Prayer   Outcome Expressed gratitude; Connection/belonging

## 2021-08-10 NOTE — PROGRESS NOTES
Shift assessment completed, see flow sheet. Patient is A&O x4. Medications administered. Patient denies further needs at this time. Call light within reach. Will continue to monitor.

## 2021-08-10 NOTE — PLAN OF CARE
Problem: Falls - Risk of:  Goal: Will remain free from falls  Description: Will remain free from falls  Outcome: Met This Shift  Goal: Absence of physical injury  Description: Absence of physical injury  Outcome: Met This Shift     Problem: Skin Integrity:  Goal: Will show no infection signs and symptoms  Description: Will show no infection signs and symptoms  Outcome: Met This Shift  Goal: Absence of new skin breakdown  Description: Absence of new skin breakdown  Outcome: Met This Shift     Problem: Nutrition  Goal: Optimal nutrition therapy  8/10/2021 0005 by Rick Boss RN  Outcome: Met This Shift  8/9/2021 1836 by Dmitriy Hendrickson RD, LD  Outcome: Ongoing     Problem: Nutrition  Goal: Understanding of nutritional guidelines  8/9/2021 1836 by Dmitriy Hendrickson RD, LD  Outcome: Ongoing

## 2021-08-10 NOTE — PROGRESS NOTES
IM Progress Note    Admit Date:  8/7/2021  3    Interval history:  Acute pancreatitis, ESRD on HD    Subjective:  Ms. Claudia Barlow seen up in bed, post HD   Feels better today . Wishes to eat     Objective:   /79   Pulse 99   Temp 97.3 °F (36.3 °C) (Oral)   Resp 16   Ht 5' 4.5\" (1.638 m)   Wt 251 lb 12.8 oz (114.2 kg)   LMP 10/24/2012   SpO2 95%   BMI 42.55 kg/m²       Intake/Output Summary (Last 24 hours) at 8/10/2021 0736  Last data filed at 8/9/2021 1330  Gross per 24 hour   Intake 233.25 ml   Output    Net 233.25 ml       Physical Exam:        General:  Middle aged obese female, sitting up    Awake, alert and oriented.  Appears to be not in any distress  Mucous Membranes:  Pink , anicteric  Neck: No JVD, no carotid bruit, no thyromegaly  Chest:  Clear to auscultation bilaterally, diminished in bases  Cardiovascular:  RRR S1S2 heard, no murmurs or gallops  Abdomen:  Soft, undistended, non tender, no organomegaly, BS present   diffuse edema of abd wall and LE 3 + with some small blisters  Dressing to wounds on left UE and Left LE   Right chest HD cahether  Extremities: severe peripheral edema improving     Distal pulses well felt  Neurological : grossly normal        Medications:   Scheduled Medications:    aspirin  81 mg Oral Daily    tiotropium  2 puff Inhalation Daily    insulin lispro  0-6 Units Subcutaneous Q6H    lamoTRIgine  200 mg Oral BID    benztropine  1 mg Oral Nightly    [Held by provider] atorvastatin  80 mg Oral Nightly    apixaban  5 mg Oral BID    budesonide-formoterol  2 puff Inhalation BID    [Held by provider] metoprolol tartrate  12.5 mg Oral BID    [Held by provider] polyethylene glycol  17 g Oral Daily    [Held by provider] b complex-C-folic acid  1 capsule Oral Daily    midodrine  10 mg Oral TID WC    sodium chloride flush  5-40 mL Intravenous 2 times per day     I   0.9% NaCl with KCl 20 mEq 50 mL/hr at 08/09/21 1330    dextrose      sodium chloride Stopped (08/09/21 0144)     polyethylene glycol, nitroGLYCERIN, glucose, dextrose, glucagon (rDNA), dextrose, sodium chloride flush, sodium chloride, ondansetron **OR** ondansetron, acetaminophen **OR** acetaminophen, morphine    Lab Data:  Recent Labs     08/07/21  1440 08/08/21  0520   WBC 6.9 6.0   HGB 8.5* 8.0*   HCT 27.4* 26.4*   MCV 81.2 82.3    179     Recent Labs     08/08/21  0520 08/09/21  0541 08/10/21  0619   * 131* 125*   K 2.8* 4.1 4.1   CL 96* 98* 93*   CO2 21 17* 18*   BUN 26* 33* 40*   CREATININE 2.4* 2.8* 3.4*     Recent Labs     08/07/21  1440   TROPONINI 0.04*       Coagulation:   Lab Results   Component Value Date    INR 1.58 07/29/2021    APTT 35.2 07/29/2021     Cardiac markers:   Lab Results   Component Value Date    CKTOTAL 86 06/27/2021    TROPONINI 0.04 08/07/2021         Lab Results   Component Value Date    ALT 25 08/10/2021    AST 26 08/10/2021     (H) 10/14/2020    ALKPHOS 277 (H) 08/10/2021    BILITOT 2.0 (H) 08/10/2021       Lab Results   Component Value Date    INR 1.58 (H) 07/29/2021    INR 2.53 (H) 07/28/2021    INR 3.75 (H) 07/27/2021    PROTIME 18.2 (H) 07/29/2021    PROTIME 29.7 (H) 07/28/2021    PROTIME 44.8 (H) 07/27/2021       Radiology    Chest xray Mild pulmonary vascular congestion. Stable mild cardiomegaly. Ct abd    1. Acute pancreatitis. 2. Cirrhosis. 3. Trace ascites. 4. Trace bilateral pleural effusions. Assessment & Plan:      Acute pancreatitis  - etiology unclear - no alcohol use, no obstructive pathology. TGL normal  - conservative mx with NPO, IVF , pain control  -improved, starting clears today   - GI consulted    ESRD - recently initiated on HD  Massive peripheral edema slightly improved from last adm- - might need daily UF    -   Chronic sCHF  - Last EF 25% 8/2020.    -diureitics as tolerated, pt on HD      - Not on ACE/ARB given ESRD     CAD s/p CABG, stents.    - Elevated troponin 2/2 increased demand and chronic kidney disease.   - resumed ASA, statin      History of PAF  - Currently in NSR    -resumed  Eliquis     Type 2 diabetes  - Controlled  - Placed on sliding scale insulin  - BG are  stable     COPD  - Stable  - Continue inhalers.     - Anemia secondary to chronic kidney disease.  - monitored     PPI and eliquis       Fela Mendez MD, 8/10/2021 7:36 AM

## 2021-08-10 NOTE — PROGRESS NOTES
Pt given lunch box by another staff member by mistake. Tolerated well. No c/o pain, nausea, or vomiting.

## 2021-08-10 NOTE — PROGRESS NOTES
Progress Note    HISTORY     CC:   Abdominal Pain               We are following for ESRD      Subjective/   HPI:  Seen on dialysis. Tolerating fluid removal.  Line is working well. Abdominal pain has improved. ROS:  Constitutional:  No fevers, No Chills, + weakness  Cardiovascular:  No palpations, + edema  Respiratory:  No wheezing, no cough  Skin:  No rash, no itching    Social Hx:  No Family at the bedside     Past Medical and Surgical History:  - Reviewed, no changes     EXAM       Objective/     Vitals:    08/09/21 2003 08/10/21 0116 08/10/21 0725 08/10/21 0831   BP: 116/78 120/79  107/66   Pulse: 98 99  94   Resp: 18 18 16 20   Temp: 96.7 °F (35.9 °C) 97.3 °F (36.3 °C)  98 °F (36.7 °C)   TempSrc: Oral Oral     SpO2: 97% 98% 95%    Weight:    246 lb 14.6 oz (112 kg)   Height:         24HR INTAKE/OUTPUT:      Intake/Output Summary (Last 24 hours) at 8/10/2021 1116  Last data filed at 8/9/2021 1330  Gross per 24 hour   Intake 233.25 ml   Output    Net 233.25 ml     Constitutional:  NAD  CV:  Regular, no rub, + LE edema  Access:  Right [facility]      MEDICAL DECISION MAKING       Data/  Recent Labs     08/07/21  1440 08/08/21  0520   WBC 6.9 6.0   HGB 8.5* 8.0*   HCT 27.4* 26.4*   MCV 81.2 82.3    179     Recent Labs     08/07/21  1440 08/07/21  1440 08/08/21  0520 08/09/21  0541 08/10/21  0619   *   < > 134* 131* 125*   K 3.3*   < > 2.8* 4.1 4.1   CL 95*   < > 96* 98* 93*   CO2 26   < > 21 17* 18*   GLUCOSE 203*   < > 176* 172* 142*   MG 1.80  --  2.00  --   --    BUN 24*   < > 26* 33* 40*   CREATININE 2.3*   < > 2.4* 2.8* 3.4*   LABGLOM 22*   < > 21* 17* 14*   GFRAA 26*   < > 25* 21* 17*    < > = values in this interval not displayed. Assessment/     ESRD:  Schedule TTS, recent start due to more of a CKD 4 with cardiorenal syndrome picture which required multiple admissions for volume management.          Fluid removal somewhat limited by low BP    Systolic Heart Failure:  - Chronic,

## 2021-08-11 NOTE — PROGRESS NOTES
Progress Note    HISTORY     CC:   Abdominal Pain               We are following for ESRD      Subjective/   HPI:    Last HD on 8/10 with 2.9 liters removed, post-weight of 108.9 kg. Seen on IUF. Pre-weight at 113.6 kg. Tolerating fluid removal.  Line is working well. Abdominal pain has improved. Intake better. ROS:  Constitutional:  No fevers, No Chills, + weakness  Cardiovascular:  No palpations, + edema  Respiratory:  No wheezing, no cough  Skin:  No rash, no itching    EXAM       Objective/     Vitals:    08/11/21 0041 08/11/21 0715 08/11/21 0750 08/11/21 0800   BP: 115/74  107/63 91/62   Pulse: 95  101 102   Resp: 18 18 17 18   Temp: 97.1 °F (36.2 °C)  96 °F (35.6 °C) 95.4 °F (35.2 °C)   TempSrc: Oral   Oral   SpO2: 99% 99%  100%   Weight:   250 lb 7.1 oz (113.6 kg)    Height:         24HR INTAKE/OUTPUT:      Intake/Output Summary (Last 24 hours) at 8/11/2021 1047  Last data filed at 8/11/2021 0410  Gross per 24 hour   Intake 1000 ml   Output 3500 ml   Net -2500 ml     GEN: chronically ill, BP 91/62   Pulse 102   Temp 95.4 °F (35.2 °C) (Oral)   Resp 18   Ht 5' 4.5\" (1.638 m)   Wt 250 lb 7.1 oz (113.6 kg)   LMP 10/24/2012   SpO2 100%   BMI 42.32 kg/m²   HEENT: non-icteric, no JVD  CV: S1, S2 without m/r/g; ++ LE edema  RESP: CTA B without w/r/r; breathing wnl  ABD: +bs, soft, nt, no hsm  SKIN: warm, no rashes  ACCESS: [facility]      MEDICAL DECISION MAKING       Data/  No results for input(s): WBC, HGB, HCT, MCV, PLT in the last 72 hours. Recent Labs     08/09/21  0541 08/10/21  0619 08/11/21  0534   * 125* 127*   K 4.1 4.1 4.7   CL 98* 93* 92*   CO2 17* 18* 15*   GLUCOSE 172* 142* 203*   BUN 33* 40* 28*   CREATININE 2.8* 3.4* 2.9*   LABGLOM 17* 14* 17*   GFRAA 21* 17* 20*       Assessment/     ESRD:  Schedule TTS, recent start due to more of a CKD 4 with cardiorenal syndrome picture which required multiple admissions for volume management.          Fluid removal somewhat limited by low BP    Systolic Heart Failure:  - Chronic, EF = 25%  - Chronic significant edema. Mild progress since starting HD    CAD:  Stable. Prior CABG. Low EF.   On room air    Abdominal Pain:    - Etiology:  Pancreatitis     Anemia of chronic disease-CKD:  - Multiple admission, some hemodilution     Possible Hep C:  GI following    Plan/     - IUF today with prn midodrine, 3 liter fluid removal target today, check standing weight post-IUF today  - HD again tomorrow per schedule  - Might benefit from daily treatments until we get her euvolemic  - Rechecking Hep C quant by NAAT

## 2021-08-11 NOTE — PROGRESS NOTES
Inpatient Physical Therapy Evaluation and Treatment    Unit: [facility]  Date:  8/11/2021  Patient Name:    Roberto Trivedi  Admitting diagnosis:  Idiopathic acute pancreatitis [K85.00]  Pancreatitis, unspecified pancreatitis type [K85.90]  Acute pancreatitis without infection or necrosis, unspecified pancreatitis type [K85.90]  Admit Date:  8/7/2021  Precautions/Restrictions/WB Status/ Lines/ Wounds/ Oxygen: Fall risk, Bed/chair alarm, Lines -IV and R IJ HD access  and Telemetry, multiple wounds on the Lower leg. Treatment Time: 4973 - 5436  Treatment Number:  1   Timed Code Treatment Minutes: 26 minutes  Total Treatment Minutes:  36  minutes    Patient Goals for Therapy: \" Go home \"          Discharge Recommendations: SNF (if declined home with 24hr assist with home PT)  DME needs for discharge: defer to facility       Therapy recommendation for EMS Transport: can transport by wheelchair    Therapy recommendations for staff:   Assist of 1 with use of rolling walker (RW) and gait belt for all transfers and ambulation to/from [facility]  to/from chair    History of Present Illness: The patient is a 60 y.o. female who presents to [facility] with abd pain. Pt states that she developed abd pain - mid abd, radiating to back a few days ago, yesterday, abd pain was associated with nausea/vomiting and hence presented to the ER. Found to have acute pancreatitis. Denies ETOH abuse, states she has had her GB surgery in the past.    Home Health S4 Level Recommendation:  Level 3 Safety  AM-PAC Mobility Score    AM-PAC Inpatient Mobility Raw Score : 16       Preadmission Environment    Pt.  Lives with friend (able to provide 24/7 assist if needed)  Home environment:    mobile home/trailer  Steps to enter first floor:   5 steps to enter     With bilateral HRs  Steps to second floor: N/A  Bathroom:       Tub/Shower unit and Tub Transfer Bench, standard height toilet, Pt uses BSC in living room  Equipment owned:      , Standard Walker (SW) and Rollator  BSC, recliner chair,   Pt sleeps on couch at home     Preadmission Status / PLOF:  History of falls             Yes, patient reports falling in tub at home, and recovered without injury  Pt. Able to drive          Yes  Pt Fully independent with ADL's         Yes  Pt. Required assistance from family for:  Cleaning, Cooking and Laundry   Pts friend performs  Pt. Fully independent for transfers and gait and walked with: No Griselda Goldmann  Retired from concrete work    Pain   No    Cognition    A&O Person, Place and Situation   Able to follow 1 step commands    Subjective  Patient sitting EOB with no family present. Pt agreeable to this PT eval & tx. Upper Extremity ROM/Strength  Please see OT evaluation. Lower Extremity ROM / Strength   AROM WFL: Yes  ROM limitations: N/A    Strength Assessment (measured on a 0-5 scale):  R LE   Quad   3+   Ant Tib  3+   Hamstring 3+   Iliopsoas 3+  L LE  Quad   3+   Ant Tib  3+   Hamstring 3+   Iliopsoas 3+    Lower Extremity Sensation    WFL    Lower Extremity Proprioception:   WFL    Coordination and Tone  WFL    Balance  Sitting:  Fair +; SBA  Comments:     Standing: Fair -; CGA  Comments: 3 min    Bed Mobility   Supine to Sit:    Not Tested  Sit to Supine:   Not Tested  Rolling:   Not Tested  Scooting in sitting: Min A  and 2 persons  Scooting in supine:  Not Tested    Transfer Training     Sit to stand:   CGA  Stand to sit:   CGA  Bed to Chair:   CGA with use of gait belt and rolling walker (RW)    Gait gait completed as indicated below  Distance:      10 ft + 10 ft  Deviations (firm surface/linoleum):  decreased tamir, increased GABINO, decreased step length bilaterally and decreased stance time bilaterally  Assistive Device Used:    gait belt and rolling walker (RW)  Level of Assist:    CGA  Comment:     Stair Training Patient declined to participate in stair ambulation due to fatigue.     Activity Tolerance   Pt completed therapy session with SOB noted with ambulation    Positioning Needs   Pt up in chair, alarm set, positioned in proper neutral alignment and pressure relief provided. Call light provided and all needs within reach    Exercises Initiated  all completed bilaterally unless indicated  Ankle Pumps x 10 reps    Other  None. Patient/Family Education   Pt educated on role of inpatient PT, POC, importance of continued activity, DC recommendations, safety awareness, transfer techniques, pursed lip breathing, energy conservation, pacing activity and calling for assist with mobility. Assessment  Pt seen for Physical Therapy evaluation in acute care setting. Patient was self limiting during functional mobility despite of improved rehab potential. Pt demonstrated decreased Activity tolerance, Balance, ROM, Safety and Strength as well as decreased independence with Ambulation, Bed Mobility  and Transfers. Recommending SNF upon discharge as patient functioning well below baseline, demonstrates good rehab potential and unable to return home due to limited or no family support, inability to negotiate stairs to enter home/bedroom/bathroom, burden of care beyond caregiver ability, home environment not conducive to patient recovery and limited safety awareness. Goals : To be met in 3 visits:  1). Independent with LE Ex x 10 reps    To be met in 6 visits:  1). Supine to/from sit: SBA  2). Sit to/from stand: SBA  3). Bed to chair: SBA  4). Gait: Ambulate 50 ft.  with  SBA and use of LRAD (least restrictive assistive device)  5). Tolerate B LE exercises 3 sets of 10-15 reps  6).   Ascend/descend 5 steps with Min A  with use of hand rail bilateral and LRAD (least restrictive assistive device)    Rehabilitation Potential: Good  Strengths for achieving goals include:   PLOF   Barriers to achieving goals include:    Weakness    Plan    To be seen 3-5 x / week  while in acute care setting for therapeutic exercises, bed mobility, transfers, progressive gait training, balance training, and family/patient education. Signature: Genia Stratton MS PT, # [id number]    If patient discharges from this facility prior to next visit, this note will serve as the Discharge Summary.

## 2021-08-11 NOTE — PROGRESS NOTES
IM Progress Note    Admit Date:  8/7/2021  4    Interval history:  Acute pancreatitis, ESRD on HD    Subjective:  Ms. Mai Erazo seen up in bed, post HD again today . Ongoing daily HD for fluid removal  Feels better today . Wishes to advance      Objective:   /74   Pulse 95   Temp 97.1 °F (36.2 °C) (Oral)   Resp 18   Ht 5' 4.5\" (1.638 m)   Wt 240 lb 1.3 oz (108.9 kg)   LMP 10/24/2012   SpO2 99%   BMI 40.57 kg/m²       Intake/Output Summary (Last 24 hours) at 8/11/2021 0734  Last data filed at 8/11/2021 0410  Gross per 24 hour   Intake 1000 ml   Output 3500 ml   Net -2500 ml       Physical Exam:        General:  Middle aged obese female, sitting up    Awake, alert and oriented.  Appears to be not in any distress  Mucous Membranes:  Pink , anicteric  Neck: No JVD, no carotid bruit, no thyromegaly  Chest:  Clear to auscultation bilaterally, diminished in bases  Cardiovascular:  RRR S1S2 heard, no murmurs or gallops  Abdomen:  Soft, undistended, non tender, no organomegaly, BS present   diffuse edema of abd wall and LE 3 + with some small blisters  Dressing to wounds on left UE and Left LE   Right chest HD cahether  Extremities: severe peripheral edema improving     Distal pulses well felt  Neurological : grossly normal        Medications:   Scheduled Medications:    aspirin  81 mg Oral Daily    tiotropium  2 puff Inhalation Daily    insulin lispro  0-6 Units Subcutaneous Q6H    lamoTRIgine  200 mg Oral BID    benztropine  1 mg Oral Nightly    [Held by provider] atorvastatin  80 mg Oral Nightly    apixaban  5 mg Oral BID    budesonide-formoterol  2 puff Inhalation BID    [Held by provider] metoprolol tartrate  12.5 mg Oral BID    [Held by provider] polyethylene glycol  17 g Oral Daily    [Held by provider] b complex-C-folic acid  1 capsule Oral Daily    midodrine  10 mg Oral TID WC    sodium chloride flush  5-40 mL Intravenous 2 times per day     I   0.9% NaCl with KCl 20 mEq 50 mL/hr at 08/10/21 1231    dextrose      sodium chloride Stopped (08/09/21 0144)     albumin human, melatonin, polyethylene glycol, nitroGLYCERIN, glucose, dextrose, glucagon (rDNA), dextrose, sodium chloride flush, sodium chloride, ondansetron **OR** ondansetron, acetaminophen **OR** acetaminophen, morphine    Lab Data:  No results for input(s): WBC, HGB, HCT, MCV, PLT in the last 72 hours. Recent Labs     08/09/21  0541 08/10/21  0619 08/11/21  0534   * 125* 127*   K 4.1 4.1 4.7   CL 98* 93* 92*   CO2 17* 18* 15*   BUN 33* 40* 28*   CREATININE 2.8* 3.4* 2.9*     No results for input(s): CKTOTAL, CKMB, CKMBINDEX, TROPONINI in the last 72 hours. Coagulation:   Lab Results   Component Value Date    INR 1.58 07/29/2021    APTT 35.2 07/29/2021     Cardiac markers:   Lab Results   Component Value Date    CKTOTAL 86 06/27/2021    TROPONINI 0.04 08/07/2021         Lab Results   Component Value Date    ALT 29 08/11/2021    AST 33 08/11/2021     (H) 10/14/2020    ALKPHOS 341 (H) 08/11/2021    BILITOT 2.3 (H) 08/11/2021       Lab Results   Component Value Date    INR 1.58 (H) 07/29/2021    INR 2.53 (H) 07/28/2021    INR 3.75 (H) 07/27/2021    PROTIME 18.2 (H) 07/29/2021    PROTIME 29.7 (H) 07/28/2021    PROTIME 44.8 (H) 07/27/2021       Radiology    Chest xray Mild pulmonary vascular congestion. Stable mild cardiomegaly. Ct abd    1. Acute pancreatitis. 2. Cirrhosis. 3. Trace ascites. 4. Trace bilateral pleural effusions. Assessment & Plan:      Acute pancreatitis  - etiology unclear - no alcohol use, no obstructive pathology.  TGL normal  - conservative mx with NPO, IVF , pain control  -improved, starting clears and tolerating , adv to low fat diet  - GI consulted    ESRD - recently initiated on HD  Massive peripheral edema slightly improved from last adm- - might need daily UF    -   Chronic sCHF  - Last EF 25% 8/2020.    -diureitics as tolerated, pt on HD    - Not on ACE/ARB given ESRD     CAD s/p CABG, stents.    - Elevated troponin 2/2 increased demand and chronic kidney disease.  - resumed ASA, statin      History of PAF  - Currently in NSR    -resumed  Eliquis     Type 2 diabetes  - Controlled  - Placed on sliding scale insulin  - BG are  stable     COPD  - Stable  - Continue inhalers.     - Anemia secondary to chronic kidney disease.  - monitored     PPI and eliquis  Start PT       Beka Woods MD, 8/11/2021 7:34 AM

## 2021-08-11 NOTE — CARE COORDINATION
INTERDISCIPLINARY PLAN OF CARE CONFERENCE    Date/Time: 8/11/2021 11:34 AM  Completed by: Sol Ding RN, Case Management      Patient Name:  Marcy Mauricio  YOB: 1965  Admitting Diagnosis: Idiopathic acute pancreatitis [K85.00]  Pancreatitis, unspecified pancreatitis type [K85.90]  Acute pancreatitis without infection or necrosis, unspecified pancreatitis type [K85.90]     Admit Date/Time:  8/7/2021  2:19 PM    Chart reviewed. Interdisciplinary team contacted or reviewed plan related to patient progress and discharge plans. Disciplines included Case Management, Nursing, and Dietitian. Current Status: IP 08/07/2021  PT/OT recommendation for discharge plan of care: NA    Expected D/C Disposition:  Home  Confirmed plan with patient   Discharge Plan Comments: Pt is active at [email or web address] for HD TTS at 0600 am. + HD today. Will have HD tomorrow as per her routine. Possible DC after HD . Plans to return home, lives with friend. IPTA. +CM following.     Home O2 in place on admit: No  Pt informed of need to bring portable home O2 tank on day of discharge for nursing to connect prior to leaving:  Not Indicated  Verbalized agreement/Understanding:  Not Indicated

## 2021-08-11 NOTE — PROGRESS NOTES
Treatment time: 3    Net UF: 3000    Pre weight: 113.6-bed,   Post weight: 110.2-bed, 111.6kg standing  EDW: tbd    Access used: cvc  Access function: well    Medications or blood products given: midodrine 10mg [id number]    Regular outpatient schedule: TTS    Summary of response to treatment: tolerated well. UF only. Report called to primary RN. Pt stable upon leaving unit    Copy of dialysis treatment record placed in chart, to be scanned into EMR.

## 2021-08-11 NOTE — PROGRESS NOTES
PROGRESS NOTE  S:55 yrs Patient  admitted on 8/7/2021 with Idiopathic acute pancreatitis [K85.00]  Pancreatitis, unspecified pancreatitis type [K85.90]  Acute pancreatitis without infection or necrosis, unspecified pancreatitis type [K85.90] . Today she feels well. She is tolerating diet, and denies abdominal pain. Exam:   Vitals:    08/11/21 1320   BP: (!) 112/98   Pulse: 108   Resp: 18   Temp: 97.9 °F (36.6 °C)   SpO2: 100%      General appearance: alert, appears older than stated age, cooperative and syndromic appearance - chronically ill appearing  HEENT: Neck supple with midline trachea  Neck: no adenopathy and supple, symmetrical, trachea midline  Lungs: clear to auscultation bilaterally  Heart: regular rate and rhythm, S1, S2 normal, no murmur, click, rub or gallop  Abdomen: normal findings: bowel sounds normal, no masses palpable, soft, non-tender and symmetric and abnormal findings:  distended and obese  Extremities: edema 2+ BLE     Medications: Reviewed    Labs:  CBC: No results for input(s): WBC, HGB, HCT, MCV, PLT in the last 72 hours. BMP:   Recent Labs     08/09/21  0541 08/10/21  0619 08/11/21  0534   * 125* 127*   K 4.1 4.1 4.7   CL 98* 93* 92*   CO2 17* 18* 15*   BUN 33* 40* 28*   CREATININE 2.8* 3.4* 2.9*     LIVER PROFILE:   Recent Labs     08/09/21  0541 08/10/21  0619 08/11/21  0534   AST 33 26 33   ALT 29 25 29   PROT 6.8 6.8 7.2   BILITOT 1.9* 2.0* 2.3*   ALKPHOS 285* 277* 341*     Attending Supervising [de-identified] Attestation Statement  The patient is a 60 y.o. female. I have performed a history and physical examination of the patient. I discussed the case with my physician assistant Hollis Robbins PA-C    I reviewed the patient's Past Medical History, Past Surgical History, Medications, and Allergies.      Physical Exam:  Vitals:    08/11/21 0800 08/11/21 1110 08/11/21 1320 08/11/21 1839   BP: 91/62 114/67 (!) 112/98    Pulse: 102 90 108 Resp: 18 17 18    Temp: 95.4 °F (35.2 °C) 96 °F (35.6 °C) 97.9 °F (36.6 °C)    TempSrc: Oral  Oral    SpO2: 100%  100% 99%   Weight:  246 lb 0.5 oz (111.6 kg)     Height:           Physical Examination: General appearance - alert, well appearing, and in no distress  Mental status - alert, oriented to person, place, and time  Eyes - pupils equal and reactive, extraocular eye movements intact  Neck - supple, no significant adenopathy  Chest - clear to auscultation, no wheezes, rales or rhonchi, symmetric air entry  Heart - normal rate, regular rhythm, normal S1, S2, no murmurs, rubs, clicks or gallops  Abdomen - soft, nontender, nondistended, no masses or organomegaly  Extremities - no pedal edema noted            Impression: 60year old female with a history of DM, HTN, HLD, COPD, CKD, CAD, atrial fibrillation, GERD, CHF, pulmonary HTN and (TRUJILLO+congestive) cirrhosis admitted with acute pancreatitis of unknown etiology. Recent MRCP negative for obvious choledocholithiasis. Recommendation:  1. Continue supportive care  2. Monitor LFTs  3. Monitor and document output  4. PO pain control  5. Miralax daily   6. IgG 4 and triglycerides normal  7. Await HCV PCR Quant results   8. Continue low fiber diet as tolerated  9. PT/OT  10. Will follow  11. Will schedule EUS in 4-6 weeks upon d/c       Anna Marie Maradiaga PA-C  2:45 PM 8/11/2021                      60year old female with a history of DM, HTN, HLD, COPD, CKD, CAD, atrial fibrillation, GERD, CHF, pulmonary HTN and (TRUJILLO+congestive) cirrhosis  admitted with acute pancreatitis of unknown etiology. Recent MRCP negative for obvious choledocholithiasis. Positive HCV ab but previous HCV RNA was undetectable.     Continue supportive care. Advance to low fat diet. PT/OT. OK to d/c from GI standpoint. Outpatient EUS in 4-6wks.      Alexandre Del Rio MD          [phone]  [phone]

## 2021-08-11 NOTE — PROGRESS NOTES
and Rollator  BSC, recliner chair,   Pt sleeps on couch at home     Preadmission Status / PLOF:  History of falls            No  Pt. Able to drive          Yes  Pt Fully independent with ADL's         No, Zachary Sanz assists with LB dressing  Pt. Required assistance from family for:  Cleaning, Cooking and Laundry   Pts friend performs  Pt. Fully independent for transfers and gait and walked with: No Device   Likes to watch TV  Retired from concrete work    Pain  No  Rating:NA  Location:  Pain Medicine Status: No request made      Cognition    A&O x4 , cues to orient to month (stated \"July\")  Able to follow 2 step commands    Subjective  Patient sitting EOB with visitor present. Jacque Kumar, roommate)  Pt agreeable to this OT eval & tx. Upper Extremity ROM:    WFL for use of walker in room, declined formal testing due to rotator cuff problems at baseline that are easily exacerbated    Upper Extremity Strength:    BUE strength impaired but not formally assessed w/ MMT      Upper Extremity Sensation    WFL    Upper Extremity Proprioception:  WFL    Coordination and Tone  WFL    Balance  Functional Sitting Balance:  WFL at EOB  Functional Standing Balance:Impaired - CGA with walker initially, improved to SBA     Bed mobility:    Supine to sit:   Not Tested  Sit to supine:   Not Tested  Rolling:    Not Tested  Scooting in sitting:  SBA  Scooting to head of bed:   Not Tested    Bridging:   Not Tested    Transfers:    Sit to stand:  CGA and with std walker, cues for hand placement  Stand to sit:  CGA and max cues for hand placement  Bed to chair:   CGA and with std walker gait belt  Standard toilet: Not Tested  Bed to [facility]:  Not Tested     Patient completed functional mobility around foot of bed to chair, took a few steps toward door and then requested to return to chair. States she is too weak (\"I have CHF\") to engage in further activity. Concern re: managing [address] home discussed.   Patient declined attempting any steps this date.    Dressing:      UE: Min A change gown  LE:    Max A don socks (caregiver assists at home)    Bathing:    UE:  Not Tested  LE:  Not Tested    Eating:   Not Tested    Toileting:  Not Tested    Activity Tolerance   Pt completed therapy session with No adverse symptoms noted w/activity   SpO2 100% on RA  HR 86-92    Positioning Needs:   Pt up in chair, alarm set, positioned in proper neutral alignment and pressure relief provided. Call light provided and all needs within reach    Exercise / Activities Initiated: NA  N/A    Patient/Family Education:   Role of OT  Recommendations for DC  Energy conservation techniques  Safe RW use/hand placement    Assessment of Deficits: Pt seen for Occupational therapy evaluation in acute care setting. Pt demonstrated decreased Activity tolerance, ADLs, IADLs, Strength and Transfers. Pt functioning below baseline and will likely benefit from skilled occupational therapy services to maximize safety and independence. Goal(s) : To be met in 3 Visits:  1). Bed to toilet/BSC: Supervision    To be met in 5 Visits:  1). Supine to/from Sit:  Supervision  2). Upper Body Bathing:   SBA  3). Lower Body Bathing:   SBA  4). Upper Body Dressing:  SBA  5). Lower Body Dressing:  Min A   6). Pt to demonstrate UE exs x 15 reps with minimal cues    Rehabilitation Potential:  Good for goals listed above. Strengths for achieving goals include: PLOF  Barriers to achieving goals include:  Weakness     Plan: To be seen 3-5 x/wk while in acute care setting for therapeutic exercises, bed mobility, transfers, dressing, bathing, family/patient education, ADL/IADL retraining, energy conservation training.      Ton Garsia, OTR/L [id number]            If patient discharges from this facility prior to next visit, this note will serve as the Discharge Summary

## 2021-08-11 NOTE — PLAN OF CARE
Problem: Falls - Risk of:  Goal: Will remain free from falls  Description: Will remain free from falls  Outcome: Met This Shift  Goal: Absence of physical injury  Description: Absence of physical injury  Outcome: Met This Shift     Problem: Skin Integrity:  Goal: Will show no infection signs and symptoms  Description: Will show no infection signs and symptoms  Outcome: Met This Shift  Goal: Absence of new skin breakdown  Description: Absence of new skin breakdown  Outcome: Met This Shift     Problem: Nutrition  Goal: Optimal nutrition therapy  Outcome: Met This Shift

## 2021-08-12 NOTE — PROGRESS NOTES
Dr Jannette Camejo here to see pt informed him of staff observing pt sticking her finger down her throat. Clear liq for now. Not to eat food brought in with family. Family has been bringing in food from [employer] [employer].

## 2021-08-12 NOTE — PROGRESS NOTES
Progress Note    HISTORY     CC:   Abdominal Pain               We are following for ESRD      Subjective/   HPI:    Last had IUF on 8/11 with 3 liters removed, post-weight of 111.6 kg (standing). Seen on dialysis. Orders confirmed. Pre-weight at HD today 110 kg (bed). Abdominal pain has improved. Intake better. ROS:  Constitutional:  No fevers, No Chills, + weakness  Cardiovascular:  No palpations, + edema  Respiratory:  No wheezing, no cough  Skin:  No rash, no itching    EXAM       Objective/     Vitals:    08/11/21 1320 08/11/21 1839 08/11/21 2115 08/12/21 0246   BP: (!) 112/98  113/79 119/76   Pulse: 108  95 98   Resp: 18  17 16   Temp: 97.9 °F (36.6 °C)  97 °F (36.1 °C) 97.8 °F (36.6 °C)   TempSrc: Oral  Oral Oral   SpO2: 100% 99% 98% 98%   Weight:       Height:         24HR INTAKE/OUTPUT:      Intake/Output Summary (Last 24 hours) at 8/12/2021 0545  Last data filed at 8/11/2021 1829  Gross per 24 hour   Intake 1580 ml   Output 3500 ml   Net -1920 ml     GEN: chronically ill, /76   Pulse 98   Temp 97.8 °F (36.6 °C) (Oral)   Resp 16   Ht 5' 4.5\" (1.638 m)   Wt 246 lb 0.5 oz (111.6 kg)   LMP 10/24/2012   SpO2 98%   BMI 41.58 kg/m²   HEENT: non-icteric, no JVD  CV: S1, S2 without m/r/g; +++ LE edema  RESP: CTA B without w/r/r; breathing wnl  ABD: +bs, soft, nt, no hsm  SKIN: warm, no rashes  ACCESS: R [facility]      MEDICAL DECISION MAKING       Data/  No results for input(s): WBC, HGB, HCT, MCV, PLT in the last 72 hours. Recent Labs     08/10/21  0619 08/11/21  0534   * 127*   K 4.1 4.7   CL 93* 92*   CO2 18* 15*   GLUCOSE 142* 203*   BUN 40* 28*   CREATININE 3.4* 2.9*   LABGLOM 14* 17*   GFRAA 17* 20*       Assessment/     ESRD:  Schedule TTS, recent start due to more of a CKD 4 with cardiorenal syndrome picture which required multiple admissions for volume management.          Fluid removal somewhat limited by low BP    Systolic Heart Failure:  - Chronic, EF = 25%  - Chronic significant edema. Mild progress since starting HD    CAD:  Stable. Prior CABG. Low EF.   On room air    Abdominal Pain:    - Etiology:  Pancreatitis     Anemia of chronic disease-CKD:  - Multiple admission, some hemodilution     Possible Hep C:  GI following    Plan/     - HD today with prn albumin, 3-4 liter fluid removal target today, check standing weight post-HD today  - IUF again tomorrow for further fluid removal  - Daily treatments until we get her euvolemic  - Continue scheduled midodrine 10 mg po tid  - Rechecking Hep C quant by NAAT

## 2021-08-12 NOTE — PLAN OF CARE
Problem: Falls - Risk of:  Goal: Will remain free from falls  Description: Will remain free from falls  8/12/2021 0154 by Laci Marie RN  Outcome: Ongoing  8/11/2021 1357 by Nany Christianson RN  Outcome: Ongoing  Goal: Absence of physical injury  Description: Absence of physical injury  8/12/2021 0154 by Laci Marie RN  Outcome: Ongoing  8/11/2021 1357 by Nany Christianson RN  Outcome: Ongoing     Problem: Skin Integrity:  Goal: Will show no infection signs and symptoms  Description: Will show no infection signs and symptoms  8/12/2021 0154 by Laci Marie RN  Outcome: Ongoing  8/11/2021 1357 by Nany Christianson RN  Outcome: Ongoing  Goal: Absence of new skin breakdown  Description: Absence of new skin breakdown  8/12/2021 0154 by Laci Marie RN  Outcome: Ongoing  8/11/2021 1357 by Nany Christianson RN  Outcome: Ongoing     Problem: Nutrition  Goal: Optimal nutrition therapy  8/12/2021 0154 by Laci Marie RN  Outcome: Ongoing  8/11/2021 1357 by Nany Christianson RN  Outcome: Ongoing  Goal: Understanding of nutritional guidelines  8/12/2021 0154 by Laci Marie RN  Outcome: Ongoing  8/11/2021 1357 by Nany Christianson RN  Outcome: Ongoing

## 2021-08-12 NOTE — PROGRESS NOTES
Physician Progress Note      Raquel Palacios  CSN #:                  [id number]  :                       1965  ADMIT DATE:       2021 2:19 PM  [address] [place] DATE:  RESPONDING  PROVIDER #:        Radha Malin MD          QUERY TEXT:    Pt admitted with acute pancreatitis. Patient was documented to have atrial   fibrillation and is maintained on Eliquis. If possible, please document in   progress notes and discharge summary:    The medical record reflects the following:  Risk Factors: atrial fibrillation on Eliquis  Clinical Indicators: atrial fibrillation, maintained on Eliquis  Treatment: Eliquis    Thanks Omega Jamison RN, [email or web address]  Options provided:  -- Secondary hypercoagulable state in patient with atrial fibrillation  -- Eliquis is prophylactic treatment only in patient with atrial fibrillation  -- Other - I will add my own diagnosis  -- Disagree - Not applicable / Not valid  -- Disagree - Clinically unable to determine / Unknown  -- Refer to Clinical Documentation Reviewer    PROVIDER RESPONSE TEXT:    This patient is on Eliquis as prophylactic treatment only related to atrial   fibrillation.     Query created by: Rufus Valdes on 2021 2:46 PM      Electronically signed by:  Radha Malin MD 2021 7:12 AM

## 2021-08-12 NOTE — PROGRESS NOTES
Patient had some n/v tonight, gave prn zofran. Dialysis in am. Patient was caught gagging herself with her fingers down her throat.

## 2021-08-12 NOTE — PROGRESS NOTES
PROGRESS NOTE  S:55 yrs Patient  admitted on 8/7/2021 with Idiopathic acute pancreatitis [K85.00]  Pancreatitis, unspecified pancreatitis type [K85.90]  Acute pancreatitis without infection or necrosis, unspecified pancreatitis type [K85.90] . Today she complains of nausea. She passed a brown BM this AM, and denies abdominal pain. Exam:   Vitals:    08/12/21 0724   BP: 123/79   Pulse: 102   Resp: 18   Temp: 97.9 °F (36.6 °C)   SpO2: 99%      General appearance: alert, appears older than stated age, cooperative, mild distress and syndromic appearance - chronically ill appearing  HEENT: Neck supple with midline trachea  Neck: no adenopathy and supple, symmetrical, trachea midline  Lungs: clear to auscultation bilaterally  Heart: regular rate and rhythm, S1, S2 normal, no murmur, click, rub or gallop  Abdomen: normal findings: no masses palpable, soft, non-tender and symmetric and abnormal findings:  distended, hypoactive bowel sounds and obese  Extremities: edema 2+ BLE     Medications: Reviewed    Labs:  CBC:   Recent Labs     08/12/21  0528   WBC 7.5   HGB 8.5*   HCT 28.1*   MCV 82.1        BMP:   Recent Labs     08/10/21  0619 08/11/21  0534 08/12/21  0528   * 127* 129*   K 4.1 4.7 4.7   CL 93* 92* 91*   CO2 18* 15* 20*   PHOS  --   --  4.2   BUN 40* 28* 37*   CREATININE 3.4* 2.9* 4.0*     LIVER PROFILE:   Recent Labs     08/10/21  0619 08/11/21  0534   AST 26 33   ALT 25 29   PROT 6.8 7.2   BILITOT 2.0* 2.3*   ALKPHOS 277* 341*     PT/INR: No results for input(s): INR in the last 72 hours. Invalid input(s): PT      Impression: 60year old female with a history of DM, HTN, HLD, COPD, CKD, CAD, atrial fibrillation, GERD, CHF, pulmonary HTN and (TRUJILLO+congestive) cirrhosis  admitted with acute pancreatitis of unknown etiology. Recent MRCP negative for obvious choledocholithiasis.  Positive HCV Ab but previous HCV RNA was undetectable. Recommendation:  1. Continue supportive care  2. Monitor LFTs  3. Monitor and document output  4. PO pain control  5. Miralax daily   6. Await HCV PCR Quant results   7. Continue low fiber diet as tolerated  8. PT/OT  9. Ok to d/c from GI standpoint   10.  Will schedule EUS in 4-6 weeks upon d/c       Nuha Verdugo PA-C  9:55 AM 8/12/2021

## 2021-08-12 NOTE — PROGRESS NOTES
PM assessment completed, see flowsheet. Patient requesting tea at this time, gave to patient. No other needs or complaints at this time. Call light within reach, bed in lowest position.

## 2021-08-12 NOTE — PLAN OF CARE
Problem: Falls - Risk of:  Goal: Will remain free from falls  Description: Will remain free from falls  8/12/2021 1006 by Yoel Chandra RN  Outcome: Ongoing     Problem: Falls - Risk of:  Goal: Will remain free from falls  Description: Will remain free from falls  8/12/2021 1006 by Yoel Chandra RN  Outcome: Ongoing     Problem: Falls - Risk of:  Goal: Absence of physical injury  Description: Absence of physical injury  8/12/2021 1006 by Yoel Chandra RN  Outcome: Ongoing     Problem: Skin Integrity:  Goal: Will show no infection signs and symptoms  Description: Will show no infection signs and symptoms  8/12/2021 1006 by Yoel Chandra RN  Outcome: Ongoing     Problem: Skin Integrity:  Goal: Absence of new skin breakdown  Description: Absence of new skin breakdown  8/12/2021 1006 by Yoel Chandra RN  Outcome: Ongoing     Problem: Nutrition  Goal: Optimal nutrition therapy  8/12/2021 1006 by Yoel Chandra RN  Outcome: Ongoing     Problem: Nutrition  Goal: Understanding of nutritional guidelines  8/12/2021 1006 by Yoel Chandra RN  Outcome: Ongoing

## 2021-08-12 NOTE — PROGRESS NOTES
Occupational Therapy Daily Treatment Note    Unit: 2 [place]  Date:  8/12/2021  Patient Name:    Rupal Eng  Admitting diagnosis:  Idiopathic acute pancreatitis [K85.00]  Pancreatitis, unspecified pancreatitis type [K85.90]  Acute pancreatitis without infection or necrosis, unspecified pancreatitis type [K85.90]  Admit Date:  8/7/2021  Precautions/Restrictions:  Fall risk, Bed/chair alarm, Lines -IV and Telemetry        Discharge Recommendations: SNF  DME needs for discharge: defer to facility       Therapy recommendations for staff:   Assist of 1 (contact guard assist) with use of rolling walker (RW) for all transfers to/from BSC/chair    AM-PAC Score: AM-PAC Inpatient Daily Activity Raw Score: 20  Home Health S4 Level: NA       Treatment Time: 15:30-15:56  Treatment number:  2    Total Treatment Time:   26 minutes    History of Present Illness: per Dr Harpal Bolaños H&P 8/8/21:  \"The patient is a 59 y. o. female who [facility] to [facility] with abd pain. Pt states that she developed abd pain - mid abd, radiating to back a few days ago, yesterday, abd pain was associated with nausea/vomiting and hence presented to the ER. Found to have acute pancreatitis. Denies ETOH abuse, states she has had her GB surgery in the past.\"    Subjective: Patient supine in bed and agreeable to treatment. Pain   No  Rating: NA  Location:  Pain Medicine Status: Denies need      Bed Mobility:   Supine to Sit:  Supervision  Sit to Supine:  Not Tested  Rolling:           Not Tested  Scooting:        Supervision    Transfer Training:   Sit to stand:   SBA  Stand to sit:  SBA  Bed to Chair:  CGA and with use of RW  Bed to [facility]:   Not Tested  Standard toilet:   Not Tested    Activity Tolerance   Pt completed therapy session with No adverse symptoms noted w/activity  SpO2:   HR:   BP:     ADL Training:   Upper body dressing:  Not Tested  Upper body bathing:  Not Tested  Lower body dressing:  Max A  Lower body bathing:   Max

## 2021-08-12 NOTE — PROGRESS NOTES
Inpatient Physical Therapy Daily Treatment Note    Unit: [facility]  Date:  8/12/2021  Patient Name:    Michael Lopez  Admitting diagnosis:  Idiopathic acute pancreatitis [K85.00]  Pancreatitis, unspecified pancreatitis type [K85.90]  Acute pancreatitis without infection or necrosis, unspecified pancreatitis type [K85.90]  Admit Date:  8/7/2021  Precautions/Restrictions:  Fall risk, Bed/chair alarm, Lines -IV and R IJ HD access  and Telemetry, multiple wounds on the Lower leg. Discharge Recommendations: SNF (if declined home with 24hr assist with home PT)  DME needs for discharge: defer to facility       Therapy recommendation for EMS Transport: can transport by wheelchair    Therapy recommendations for staff:   Assist of 1 with use of rolling walker (RW) and gait belt for all transfers and ambulation to/from [facility]  to/from [facility]    History of Present Illness: The patient is a 59 y. o. female who presents to [facility] with abd pain. Pt states that she developed abd pain - mid abd, radiating to back a few days ago, yesterday, abd pain was associated with nausea/vomiting and hence presented to the ER. Found to have acute pancreatitis. Denies ETOH abuse, states she has had her GB surgery in the past.    Home Health S4 Level Recommendation: Level 3 Safety  AM-PAC Mobility Score   AM-PAC Inpatient Mobility Raw Score : 16     Treatment Time: 1600 - 1618  Treatment number: 2  Timed Code Treatment Minutes: 18 minutes  Total Treatment Minutes:  18  minutes    Cognition    A&O x4   Able to follow 2 step commands    Subjective  Patient lying supine in bed with no family present   Pt agreeable to this PT tx.      Pain   Yes  Location: bilateral LEs  Rating:    mild/10  Pain Medicine Status: Pain med requested     Bed Mobility   Supine to Sit:    Not Tested  Sit to Supine:   Not Tested  Rolling:   Not Tested  Scooting:   SBA    Transfer Training     Sit to stand:   Not Tested  Stand to sit:   Not Tested  Bed to Chair: Not Tested with use of N/A    Gait Training gait deferred due to fatigue post dialysis; pt ambulated 0 ft. Distance:      0 ft  Deviations (firm surface/linoleum):  N/A  Assistive Device Used:    N/A  Level of Assist:    Not Tested  Comment:     Stair Training deferred, pt unsafe/not appropriate to complete stairs at this time  # of Steps:   N/A  Level of Assist:  Not Tested  UE Support:  NA  Assistive Device:  N/A  Pattern:   N/A  Comments:     Therapeutic Exercise all completed bilaterally unless indicated  Ankle Pumps x 20 reps  LAQ x 20 reps  marching x 20 reps  Hip abduction: x 20 reps  Hip adduction/pillow squeeze: x 20 reps    Balance  Sitting:  Fair +; CGA  Comments:     Standing: Not tested; Not Tested  Comments:     Patient Education      Role of PT, POC, Discharge recommendations, DC recommendations, safety awareness, transfer techniques, pursed lip breathing, energy conservation, pacing activity and calling for assist with mobility. Positioning Needs       Pt reclined in chair, alarm set, positioned in proper neutral alignment and pressure relief provided. Call light provided and all needs within reach    ROM Measurements N/A  Knee Flexion:  Knee Extension:     Activity Tolerance   Pt completed therapy session with No adverse symptoms noted w/activity. SpO2: 100% throughout the session  HR: 89 bpm    Other  N/A    Assessment :  Patient participated in the therapeutic exercises only while in the chair without any adverse effects. Patient declined to participate in functional transfers and ambulation since she was fatigued post dialysis this morning and after performing Occupational therapy session.    Recommending SNF upon discharge as patient functioning well below baseline, demonstrates good rehab potential and unable to return home due to limited or no family support, inability to negotiate stairs to enter home/bedroom/bathroom, burden of care beyond caregiver ability, home environment not conducive to patient recovery and limited safety awareness. Goals (all goals ongoing unless otherwise indicated)  To be met in 3 visits:  1). Independent with LE Ex x 10 reps     To be met in 6 visits:  1). Supine to/from sit: SBA  2). Sit to/from stand: SBA  3). Bed to chair: SBA  4). Gait: Ambulate 50 ft.  with  SBA and use of LRAD (least restrictive assistive device)  5). Tolerate B LE exercises 3 sets of 10-15 reps  6). Ascend/descend 5 steps with Min A  with use of hand rail bilateral and LRAD (least restrictive assistive device)    Plan   Continue with plan of care. Signature: Selwyn Redman MS PT, # [id number]    If patient discharges from this facility prior to next visit, this note will serve as the Discharge Summary.

## 2021-08-12 NOTE — PROGRESS NOTES
Pt completed 3 hours of hemodialysis and removed 3.5 liters of fluid without difficulty. Critline maintained an 'A' profile for the last hour. Catheter performed well and dwelled with heparin post treatment.         08/12/21 1035 08/12/21 1400   Vital Signs   Temp 98.2 °F (36.8 °C) 98 °F (36.7 °C)   Pulse 102 107   Resp 16 16   /75 120/69   Height and Weight   Weight 245 lb 2.4 oz (111.2 kg) 239 lb (108.4 kg)   Weight Method  --  Standing scale

## 2021-08-12 NOTE — PROGRESS NOTES
ESRD     CAD s/p CABG, stents.    - Elevated troponin 2/2 increased demand and chronic kidney disease.  - resumed ASA, statin      History of PAF  - Currently in NSR    -resumed  Eliquis     Type 2 diabetes  - Controlled  - Placed on sliding scale insulin  - BG are  stable     COPD  - Stable  - Continue inhalers.     - Anemia secondary to chronic kidney disease.  - monitored     PPI and eliquis  Started PT recommends SNF but pt refused  Clear liquids         Verena Ureña MD, 8/12/2021 7:29 AM

## 2021-08-13 NOTE — PROGRESS NOTES
Treatment time: 3 Hrs    Net UF: 2000     Pre weight: 105 kg  Post weight: 104.6 kg  EDW: TBD    Access used: R CVC  Access function: Good    Medications or blood products given: N/A    Regular outpatient schedule: [facility]    Summary of response to treatment: Patient tolerated treatment well. Access ran well at ordered . Report given to primary RN. Patient returned to room . VSS    Copy of dialysis treatment record placed in chart, to be scanned into EMR.

## 2021-08-13 NOTE — PLAN OF CARE
Problem: Falls - Risk of:  Goal: Will remain free from falls  Description: Will remain free from falls  8/13/2021 0959 by Ed Ott RN  Outcome: Ongoing     Problem: Falls - Risk of:  Goal: Absence of physical injury  Description: Absence of physical injury  8/13/2021 0959 by Ed Ott RN  Outcome: Ongoing     Problem: Skin Integrity:  Goal: Will show no infection signs and symptoms  Description: Will show no infection signs and symptoms  8/13/2021 0959 by Ed Ott RN  Outcome: Ongoing     Problem: Skin Integrity:  Goal: Absence of new skin breakdown  Description: Absence of new skin breakdown  8/13/2021 0959 by Ed Ott RN  Outcome: Ongoing     Problem: Nutrition  Goal: Optimal nutrition therapy  8/13/2021 0959 by Ed Ott RN  Outcome: Ongoing     Problem: Nutrition  Goal: Understanding of nutritional guidelines  8/13/2021 0959 by Ed Ott RN  Outcome: Ongoing

## 2021-08-13 NOTE — PROGRESS NOTES
PROGRESS NOTE  S:55 yrs Patient  admitted on 8/7/2021 with Idiopathic acute pancreatitis [K85.00]  Pancreatitis, unspecified pancreatitis type [K85.90]  Acute pancreatitis without infection or necrosis, unspecified pancreatitis type [K85.90] . Today she feels well. She is hungry. Exam:   Vitals:    08/13/21 0810   BP: 108/63   Pulse: 104   Resp: 16   Temp: 97.1 °F (36.2 °C)   SpO2:       General appearance: alert, appears older than stated age, cooperative, no distress and syndromic appearance - chronically ill appearing  HEENT: Neck supple with midline trachea  Neck: no adenopathy and supple, symmetrical, trachea midline  Lungs: clear to auscultation bilaterally  Heart: regular rate and rhythm, S1, S2 normal, no murmur, click, rub or gallop  Abdomen: normal findings: no masses palpable and symmetric and abnormal findings:  distended, hypoactive bowel sounds and obese  Extremities: edema 2+ BLE     Medications: Reviewed    Labs:  CBC:   Recent Labs     08/12/21  0528   WBC 7.5   HGB 8.5*   HCT 28.1*   MCV 82.1        BMP:   Recent Labs     08/11/21  0534 08/12/21  0528 08/13/21  0543   * 129* 136   K 4.7 4.7 4.3   CL 92* 91* 95*   CO2 15* 20* 24   PHOS  --  4.2 4.1   BUN 28* 37* 25*   CREATININE 2.9* 4.0* 3.0*     LIVER PROFILE:   Recent Labs     08/11/21  0534   AST 33   ALT 29   PROT 7.2   BILITOT 2.3*   ALKPHOS 341*     PT/INR: No results for input(s): INR in the last 72 hours. Invalid input(s): PT      Impression: 60year old female with a history of DM, HTN, HLD, COPD, CKD, CAD, atrial fibrillation, GERD, CHF, pulmonary HTN and (TRUJILLO+congestive) cirrhosis  admitted with acute pancreatitis of unknown etiology. Recent MRCP negative for obvious choledocholithiasis. Positive HCV Ab but HCV RNA is undetectable. Recommendation:  1. Continue supportive care  2. Monitor LFTs  3. Monitor and document output  4. PO pain control  5. Miralax daily   6.  HCV PCR Quant not detected   7. Advance to full liquid diet as tolerated  8. Slowly advance to low fiber diet as abdominal pain improves  9. PT/OT  10. Nephrology following - daily HD for ESRD management   11. Ok to d/c from GI standpoint   12.  Will schedule EUS in 4-6 weeks upon d/c       Carolina Lema PA-C  11:27 AM 8/13/2021

## 2021-08-13 NOTE — PROGRESS NOTES
Progress Note    HISTORY     CC:   Abdominal Pain               We are following for ESRD      Subjective/   HPI:    Last had HD on 8/12 with 3,5 liters removed, post-weight of 108.4 (standing). Seen on dialysis. Orders confirmed. Pre-weight at HD today 105 kg (bed). Abdominal pain has improved. Intake better. ROS:  Constitutional:  No fevers, No Chills, + weakness  Cardiovascular:  No palpations, + edema  Respiratory:  No wheezing, no cough  Skin:  No rash, no itching    EXAM       Objective/     Vitals:    08/12/21 2041 08/13/21 0537 08/13/21 0730 08/13/21 0810   BP: 112/75 109/74 102/71 108/63   Pulse: 109 105 105 104   Resp: 16 16 16 16   Temp: 97 °F (36.1 °C) 96.8 °F (36 °C) 97 °F (36.1 °C) 97.1 °F (36.2 °C)   TempSrc: Oral Oral Oral    SpO2: 99% 92% 100%    Weight:       Height:         24HR INTAKE/OUTPUT:      Intake/Output Summary (Last 24 hours) at 8/13/2021 1044  Last data filed at 8/12/2021 1810  Gross per 24 hour   Intake 360 ml   Output    Net 360 ml     GEN: chronically ill, /63   Pulse 104   Temp 97.1 °F (36.2 °C)   Resp 16   Ht 5' 4.5\" (1.638 m)   Wt 239 lb (108.4 kg)   LMP 10/24/2012   SpO2 100%   BMI 40.39 kg/m²   HEENT: non-icteric, no JVD  CV: S1, S2 without m/r/g; +++ LE edema  RESP: CTA B without w/r/r; breathing wnl  ABD: +bs, soft, nt, no hsm  SKIN: warm, no rashes  ACCESS: R [facility]    MEDICAL DECISION MAKING       Data/  Recent Labs     08/12/21  0528   WBC 7.5   HGB 8.5*   HCT 28.1*   MCV 82.1        Recent Labs     08/11/21  0534 08/12/21  0528 08/13/21  0543   * 129* 136   K 4.7 4.7 4.3   CL 92* 91* 95*   CO2 15* 20* 24   GLUCOSE 203* 201* 175*   PHOS  --  4.2 4.1   BUN 28* 37* 25*   CREATININE 2.9* 4.0* 3.0*   LABGLOM 17* 12* 16*   GFRAA 20* 14* 19*       Assessment/     ESRD:  Schedule TTS, recent start due to more of a CKD 4 with cardiorenal syndrome picture which required multiple admissions for volume management.          Fluid removal somewhat limited by low BP    Systolic Heart Failure:  - Chronic, EF = 25%  - Chronic significant edema. Mild progress since starting HD    CAD:  Stable. Prior CABG. Low EF.   On room air    Abdominal Pain:    - Etiology:  Pancreatitis     Anemia of chronic disease-CKD:  - Multiple admission, some hemodilution     Possible Hep C:  Hep C NAAT negative, GI following    Plan/     - HD today with prn albumin, ~3 liter fluid removal target today, check standing weight post-HD today  - IUF again tomorrow for further fluid removal  - Daily treatments until we get her euvolemic  - Monitor for BELKIS needs once euvolemic  - Continue scheduled midodrine 10 mg po tid  - Trend labs, bp's, & urine output

## 2021-08-13 NOTE — PROGRESS NOTES
IM Progress Note    Admit Date:  8/7/2021  6    Interval history:  Acute pancreatitis, ESRD on HD    Started diet and tolerated    Subjective:  Ms. Fernando Clarke seen up in bed, had arby's sliders and increased  nausea this am , no increase in abd pain   Had BM     Objective:   /71   Pulse 105   Temp 97 °F (36.1 °C) (Oral)   Resp 16   Ht 5' 4.5\" (1.638 m)   Wt 239 lb (108.4 kg)   LMP 10/24/2012   SpO2 100%   BMI 40.39 kg/m²       Intake/Output Summary (Last 24 hours) at 8/13/2021 0747  Last data filed at 8/12/2021 1810  Gross per 24 hour   Intake 360 ml   Output    Net 360 ml       Physical Exam:      General:  Middle aged obese female, sitting up    Awake, alert and oriented.  Appears to be not in any distress  Mucous Membranes:  Pink , anicteric  Neck: No JVD, no carotid bruit, no thyromegaly  Chest:  Clear to auscultation bilaterally, diminished in bases  Cardiovascular:  RRR S1S2 heard, no murmurs or gallops  Abdomen:  Soft, undistended, non tender, no organomegaly, BS present   diffuse edema of abd wall and LE 3 + with some small blisters  Dressing to wounds on left UE and Left LE   Right chest HD cahether  Extremities: severe peripheral edema improving     Distal pulses well felt  Neurological : grossly normal        Medications:   Scheduled Medications:    docusate sodium  100 mg Oral BID    aspirin  81 mg Oral Daily    tiotropium  2 puff Inhalation Daily    insulin lispro  0-6 Units Subcutaneous Q6H    lamoTRIgine  200 mg Oral BID    benztropine  1 mg Oral Nightly    atorvastatin  80 mg Oral Nightly    apixaban  5 mg Oral BID    budesonide-formoterol  2 puff Inhalation BID    [Held by provider] metoprolol tartrate  12.5 mg Oral BID    polyethylene glycol  17 g Oral Daily    b complex-C-folic acid  1 capsule Oral Daily    midodrine  10 mg Oral TID WC    sodium chloride flush  5-40 mL Intravenous 2 times per day     I   dextrose      sodium chloride Stopped (08/09/21 0144)     albumin human, melatonin, nitroGLYCERIN, glucose, dextrose, glucagon (rDNA), dextrose, sodium chloride flush, sodium chloride, ondansetron **OR** ondansetron, acetaminophen **OR** acetaminophen, morphine    Lab Data:  Recent Labs     08/12/21  0528   WBC 7.5   HGB 8.5*   HCT 28.1*   MCV 82.1        Recent Labs     08/11/21  0534 08/12/21  0528 08/13/21  0543   * 129* 136   K 4.7 4.7 4.3   CL 92* 91* 95*   CO2 15* 20* 24   PHOS  --  4.2 4.1   BUN 28* 37* 25*   CREATININE 2.9* 4.0* 3.0*     No results for input(s): CKTOTAL, CKMB, CKMBINDEX, TROPONINI in the last 72 hours. Coagulation:   Lab Results   Component Value Date    INR 1.58 07/29/2021    APTT 35.2 07/29/2021     Cardiac markers:   Lab Results   Component Value Date    CKTOTAL 86 06/27/2021    TROPONINI 0.04 08/07/2021         Lab Results   Component Value Date    ALT 29 08/11/2021    AST 33 08/11/2021     (H) 10/14/2020    ALKPHOS 341 (H) 08/11/2021    BILITOT 2.3 (H) 08/11/2021       Lab Results   Component Value Date    INR 1.58 (H) 07/29/2021    INR 2.53 (H) 07/28/2021    INR 3.75 (H) 07/27/2021    PROTIME 18.2 (H) 07/29/2021    PROTIME 29.7 (H) 07/28/2021    PROTIME 44.8 (H) 07/27/2021       Radiology    Chest xray Mild pulmonary vascular congestion. Stable mild cardiomegaly. Ct abd    1. Acute pancreatitis. 2. Cirrhosis. 3. Trace ascites. 4. Trace bilateral pleural effusions. Assessment & Plan:      Acute pancreatitis  - etiology unclear - no alcohol use, no obstructive pathology.  TGL normal  - conservative mx with NPO, IVF , pain control  -improved, started clears and adv  - however nausea returned with eating fried food  - clears again today , not ready for dc     ESRD - recently initiated on HD  Massive peripheral edema slightly improved from last adm- ongoing fluid removal with  daily UF    -   Chronic sCHF  - Last EF 25% 8/2020.    -diureitics as tolerated, pt on HD    - Not on ACE/ARB given ESRD     CAD s/p

## 2021-08-13 NOTE — PLAN OF CARE
Problem: Falls - Risk of:  Goal: Will remain free from falls  Description: Will remain free from falls  8/13/2021 1501 by Mae Arreguin RN  Outcome: Completed     Problem: Falls - Risk of:  Goal: Absence of physical injury  Description: Absence of physical injury  8/13/2021 1501 by Mae Arreguin RN  Outcome: Completed     Problem: Skin Integrity:  Goal: Will show no infection signs and symptoms  Description: Will show no infection signs and symptoms  8/13/2021 1501 by Mae Arerguin RN  Outcome: Completed     Problem: Skin Integrity:  Goal: Absence of new skin breakdown  Description: Absence of new skin breakdown  8/13/2021 1501 by Mae Arreguin RN  Outcome: Completed     Problem: Nutrition  Goal: Optimal nutrition therapy  8/13/2021 1501 by Mae Arreguin RN  Outcome: Completed     Problem: Nutrition  Goal: Understanding of nutritional guidelines  8/13/2021 1501 by Mae Arreguin RN  Outcome: Completed

## 2021-08-13 NOTE — CARE COORDINATION
DISCHARGE ORDER  Date/Time 2021 3:24 PM  Completed by: Sol Ding RN, Case Management    Patient Name: Marcy Mauricio      : 1965  Admitting Diagnosis: Idiopathic acute pancreatitis [K85.00]  Pancreatitis, unspecified pancreatitis type [K85.90]  Acute pancreatitis without infection or necrosis, unspecified pancreatitis type [K85.90]      Admit order Date and Status: IP 2021  (verify MD's last order for status of admission)      Noted discharge order. If applicable PT/OT recommendation at Discharge: NA  DME recommendation by PT/OT: NA  Confirmed discharge plan with patient and SO at bedside  Discharge Plan: Chart reviewed. Met with pt and SO at bedside and explained the role of the CM. Plans to return home today by private car. Resume HD at [facility]. Pt denies any HHC or DME needs. No other barriers to DC identified. Reviewed chart. Role of discharge planner explained and patient verbalized understanding. Discharge order is noted. Has Home O2 in place on admit:  No  Informed of need to bring portable home O2 tank on day of discharge for nursing to connect prior to leaving:   Not Indicated  Verbalized agreement/Understanding:   Not Indicated  Pt is being d/c'd to home today. Pt's O2 sats are 100% on RA. Discharge timeout done with [facility]. All discharge needs and concerns addressed.

## 2021-08-31 NOTE — TELEPHONE ENCOUNTER
Covering for Claire  BNP likely to be elevated. Patient is now on dialysis for fluid management and ESRD. Do not see where our office ordered weekly BMP and BNP. Tomi Escamilla will review when she returns if that is needed from a cardiology perspective.

## 2021-08-31 NOTE — TELEPHONE ENCOUNTER
Spoke with Isa,nurse, at [facility] and she states she received a call Sunday advising to draw a weekly BMP & BNP. She states that the patient has just recently came back to facility. States according to current medications . The patient is not on Torsemide. She is faxing over current med sheet to the office.      Call disconnected and called back Nurse is heading to lunch and was advised to call back in 30 minutes for more info in regards to weights and current symptoms

## 2021-09-01 NOTE — TELEPHONE ENCOUNTER
Please re-read my message. I did not order labs. I am deferring labs to nephrology/ dialysis. Please arrange a follow up appointment with me.    Nicki Merlin, APRN - CNP

## 2021-09-02 NOTE — TELEPHONE ENCOUNTER
Spoke with Gretel Vazquez , nurse, in absence of Yogi Dominguez. Advised her of the message below per [employer] and to defer to nephrology/dialysis. She voiced understanding and has been transferred to scheduling to make appt .  Call complete

## 2021-09-04 NOTE — TELEPHONE ENCOUNTER
Writer contacted ED provider to inform of 30 day readmission risk. Writer was informed there is no decision on disposition at this time.

## 2021-09-04 NOTE — ED PROVIDER NOTES
Emergency Department Attending Note    Mitali Kendall MD    Date of ED VIsit: 9/3/2021    CHIEF COMPLAINT  Fall (Pt fell at the [address] [facility] and has multiple complaints of pain all over her \"left side\") and Laceration (Pt has a laceration to her L lower ear lob and multiple skin tears. )      HISTORY OF PRESENT ILLNESS  Kasia Bradford is a 60 y.o. female  With Vital signs of BP 98/66   Pulse 83   Temp 97.7 °F (36.5 °C)   Resp 18   Ht 5' 4\" (1.626 m)   Wt 238 lb (108 kg)   LMP 10/24/2012   SpO2 100%   BMI 40.85 kg/m²  who presents to the ED with a complaint of fall. Patient seen and evaluated in room 4. Patient states that she was bending over to pick something up and fell forward striking her head and left side on the floor but also she must be she must of struck her left arm on something because she is got a couple skin tears on that side. There was no loss of consciousness with this. She is not complaining of any head pain she is complaining of some mild face pain. She says her neck hurts as well. She got no complaints of hip pain. A separate complaint is that of what looks like a cellulitis to the left shin. But otherwise she seems to be doing fine with no other complaints. .  No other complaints, modifying factors or associated symptoms.     Patients Past medical history reviewed and listed below  Past Medical History:   Diagnosis Date    Acute blood loss anemia 8/5/2020    Acute kidney injury ([address] 12/25/2019    Acute kidney injury superimposed on CKD ([address] 8/5/2020    Acute on chronic combined systolic and diastolic congestive heart failure ([address] 1/10/2020    Acute on chronic right-sided heart failure ([address] 08/2020    Alcohol dependence ([address] 3/10/2010    Angina pectoris ([address]     Arthritis     Asthma     Atrial fibrillation ([address]     CAD (coronary artery disease)     Cardiomyopathy ([address]     Cellulitis 6/3/2020    Charcot's joint of ankle, left     Chronic kidney disease     [facility] obstructive [facility]     Diabetic ulcer of left foot associated with type 2 diabetes mellitus, limited to breakdown of skin ([address] 1/18/2020    Diabetic ulcer of toe of right foot associated with type 2 diabetes mellitus ([address] 1/18/2020    Enthesopathy     GERD (gastroesophageal reflux disease)     Hyperlipidemia     Hypertension     Left renal artery stenosis ([address] 08/2020    MI (myocardial infarction) ([address] 10/07/2019    NSTEMI    Morbid obesity (HCC)     Osteoarthritis     Peripheral neuropathy     Peripheral vascular disease (HCC)     Polyarthritis     Positive FIT (fecal immunochemical test) 2/28/2020    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 steal syndrome 09/01/2020    Traumatic perinephric hematoma, left, initial encounter 8/4/2020     Past Surgical History:   Procedure Laterality Date    ARTERIAL BYPASS SURGRY Left 01/06/2016    Axillary/Subclavian to Brachial Bypass w/reversed SVG    CARDIAC CATHETERIZATION  03/27/2018    Dr. Levander Gowers - at [address] [facility]  01/20/2020    Dr. Inessa Ambrose      pancreatitis post surgery    CORONARY ANGIOPLASTY WITH STENT PLACEMENT  03/16/2018    MELODY- 3.5 x 38 and 3.5 x 20 to Cx    CORONARY ANGIOPLASTY WITH STENT PLACEMENT  01/03/2018    MELODY- 3.0 x 38 and 2.75 x 26 and 2.75 x 8 and 2.75 x 22 to the LAD    CORONARY ANGIOPLASTY WITH STENT PLACEMENT  10/07/2019    MELODY- 2.5 x 8 to [address] GRAFT N/A 01/27/2020    CORONARY ARTERY BYPASS GRAFTING X 1, ON PUMP BEATING HEART, INTERNAL MAMMARY ARTERY TO LEFT ANTERIOR DESCENDING ARTERY, VAS CATH INSERTION, URI, PLATELET GEL APPLICATION, 5 LEVEL BILATERAL INTERCOSTAL NERVE BLOCK, STERNAL PLATING performed by Jaciel Delgado MD at [address] Right 05/16/2019    fem-pop, performed by Beau Tyler MD at [address]  02/14/2019    CardioMEMs insertion for Used   Vaping Use    Vaping Use: Never used   Substance and Sexual Activity    Alcohol use: Not Currently     Comment: quit 2006     Drug use: Never    Sexual activity: Yes     Partners: Male   Other Topics Concern    Not on file   Social History Narrative    Not on file     Social Determinants of Health     Financial Resource Strain:     Difficulty of Paying Living Expenses:    Food Insecurity:     Worried About Running Out of Food in the Last Year:     [address] in the Last Year:    Transportation Needs:     Lack of Transportation (Medical):  Lack of Transportation (Non-Medical):    Physical Activity:     Days of Exercise per Week:     Minutes of Exercise per Session:    Stress:     Feeling of Stress :    Social Connections:     Frequency of Communication with Friends and Family:     Frequency of Social Gatherings with Friends and Family:     Attends Shinto Services:     Active Member of Clubs or Organizations:     Attends Club or Organization Meetings:     Marital Status:    Intimate Partner Violence:     Fear of Current or Ex-Partner:     Emotionally Abused:     Physically Abused:     Sexually Abused:      No current facility-administered medications for this encounter. Current Outpatient Medications   Medication Sig Dispense Refill    amiodarone (CORDARONE) 200 MG tablet Take 200 mg by mouth daily      ARIPiprazole (ABILIFY) 15 MG tablet Take 15 mg by mouth daily      busPIRone (BUSPAR) 30 MG tablet Take 30 mg by mouth 2 times daily      carvedilol (COREG) 3.125 MG tablet Take 3.125 mg by mouth 2 times daily (with meals)      doxepin (SINEQUAN) 25 MG capsule Take 25 mg by mouth nightly      cephALEXin (KEFLEX) 500 MG capsule Take 1 capsule by mouth 3 times daily       HYDROcodone-acetaminophen (NORCO) 5-325 MG per tablet Take by mouth every 8 hours as needed.        albuterol sulfate  (90 Base) MCG/ACT inhaler       silver sulfADIAZINE (SILVADENE) 1 % cream Apply topically daily Apply topically daily.  Insulin Degludec (TRESIBA FLEXTOUCH) 100 UNIT/ML SOPN Inject 5 Units into the skin nightly (Patient not taking: Reported on 8/31/2021) 10 pen 5    hydrOXYzine (VISTARIL) 25 MG capsule Take 10 mg by mouth 3 times daily as needed for Itching (Patient not taking: Reported on 8/31/2021)      metoprolol tartrate (LOPRESSOR) 25 MG tablet Take 0.5 tablets by mouth 2 times daily (Patient not taking: Reported on 8/31/2021)      torsemide (DEMADEX) 100 MG tablet Take 1 tablet by mouth daily (Patient not taking: Reported on 8/31/2021)      polyethylene glycol (GLYCOLAX) 17 g packet Take 17 g by mouth daily (Patient not taking: Reported on 8/31/2021)      b complex-C-folic acid (NEPHROCAPS) 1 MG capsule Take 1 capsule by mouth daily (Patient not taking: Reported on 8/31/2021)      midodrine (PROAMATINE) 10 MG tablet Take 1 tablet by mouth 3 times daily (with meals) (Patient not taking: Reported on 8/31/2021)      pantoprazole (PROTONIX) 40 MG tablet Take 1 tablet by mouth daily      acetaminophen (TYLENOL) 650 MG extended release tablet Take 650 mg by mouth every 4 hours as needed for Pain      Umeclidinium Bromide (INCRUSE ELLIPTA) 62.5 MCG/INH AEPB Inhale 1 Dose into the lungs daily (Patient not taking: Reported on 8/31/2021)      fluticasone-salmeterol (ADVAIR) 250-50 MCG/DOSE AEPB Inhale 1 puff into the lungs 2 times daily (Patient not taking: Reported on 8/31/2021)      apixaban (ELIQUIS) 5 MG TABS tablet Take 1 tablet by mouth 2 times daily (Patient taking differently: Take 2.5 mg by mouth 2 times daily ) 60 tablet     miconazole (MICOTIN) 2 % powder Apply topically 2 times daily.  (Patient not taking: Reported on 8/31/2021) 45 g 1    Blood Glucose Monitoring Suppl (ONE TOUCH ULTRA 2) w/Device KIT USE TO MONITOR BLOOD GLUCOSE LEVELS 1 kit 0    ONE TOUCH ULTRASOFT LANCETS MISC USE TO TEST BLOOD GLUCOSE LEVELS 4 TIMES DAILY (Patient not taking: Reported on 8/31/2021) 150 each 3    blood glucose test strips (ASCENSIA AUTODISC VI;ONE TOUCH ULTRA TEST VI) strip USE TO MONITOR BLOOD GLUCOSE LEVELS 4 TIMES DAILY 150 each 3    nitroGLYCERIN (NITROSTAT) 0.4 MG SL tablet Place 1 tablet under the tongue every 5 minutes as needed for Chest pain up to max of 3 total doses. If no relief after 1 dose, call 911. 25 tablet 0    Insulin Pen Needle (B-D ULTRAFINE III SHORT PEN) 31G X 8 MM MISC Five shots a day 200 each 5    atorvastatin (LIPITOR) 80 MG tablet TAKE 1 TABLET BY MOUTH EVERY DAY AT NIGHT (Patient not taking: Reported on 8/31/2021) 90 tablet 3    blood glucose test strips (FREESTYLE LITE) strip USE TO CHECK BLOOD GLUCOSE LEVELS FOUR TIMES DAILY 200 strip 10    benztropine (COGENTIN) 1 MG tablet Take 1 mg by mouth nightly  (Patient not taking: Reported on 8/31/2021)      INSULIN SYRINGE .5CC/29G 29G X 1/2\" 0.5 ML MISC 1 each by Does not apply route daily 100 each 3    clopidogrel (PLAVIX) 75 MG tablet TAKE 1 TABLET BY MOUTH EVERY DAY 30 tablet 5    lamoTRIgine (LAMICTAL) 200 MG tablet Take 200 mg by mouth 2 times daily        Allergies   Allergen Reactions    Lisinopril Other (See Comments)     Severe hypotension       REVIEW OF SYSTEMS  10 systems reviewed, pertinent positives per HPI otherwise noted to be negative     PHYSICAL EXAM  BP 98/66   Pulse 83   Temp 97.7 °F (36.5 °C)   Resp 18   Ht 5' 4\" (1.626 m)   Wt 238 lb (108 kg)   LMP 10/24/2012   SpO2 100%   BMI 40.85 kg/m²   GENERAL APPEARANCE: Awake and alert. Cooperative. In no obvious distress. HEAD: Normocephalic. Atraumatic. EYES: PERRL. EOM's grossly intact. ENT: Mucous membranes are pink and moist.   NECK: Supple. HEART: RRR. No murmurs. LUNGS: Respirations unlabored. CTAB. Good air exchange. ABDOMEN: Soft. Non-distended. Non-tender. No masses. No organomegaly. No guarding or rebound. EXTREMITIES: No peripheral edema. Moves all extremities equally.  All extremities neurovascularly intact. Patient has skin tears x2 on her right forearm. When you look at her left leg her lower leg reveals an area of redness and weeping which is probably due to a cellulitis which is not being treated by antibiotics at this point. There is no hip tenderness either. SKIN: Warm and dry. No acute rashes. NEUROLOGICAL: Alert and oriented. Strength 5/5, sensation intact. PSYCHIATRIC: Normal mood and affect. No HI or SI expressed to me. RADIOLOGY    If acquired see below     EKG:     If acquired see below       ED COURSE/MDM    Patient will have a CT scan of the head and neck based on her fall and she her being on a blood thinner. Both CT head and CT spine were both negative. Her skin tags were bandaged and she will be sent back to the [facility]. ED Course as of Sep 03 2213   Fri Sep 03, 2021   2213 IMPRESSION:  No evidence of acute intracranial process. CT Head WO Contrast [DL]   0894 IMPRESSION:  Moderate degenerative disc disease at C5-C6 with associated right-sided bony  neural foraminal narrowing. No acute fracture or subluxation.       [DL]      ED Course User Index  [DL] Karma Santiago MD       The ED course and plan were reviewed and results discussed with the patient    The patient understood and agreed with the Discharge/transfer planning.     CLINICAL IMPRESSION and DISPOSITION    Alejandra Paez was stable and diagnosed with fall    Patient was treated with Isaiah Valdez MD  09/03/21 2214       Karma Santiago MD  09/03/21 2214

## 2021-09-07 NOTE — ED PROVIDER NOTES
Chloride 95 (L) 99 - 110 mmol/L    CO2 18 (L) 21 - 32 mmol/L    Anion Gap 18 (H) 3 - 16    Glucose 291 (H) 70 - 99 mg/dL    BUN 33 (H) 7 - 20 mg/dL    CREATININE 3.6 (H) 0.6 - 1.1 mg/dL    GFR Non-African American 13 (A) >60    GFR  16 (A) >60    Calcium 9.1 8.3 - 10.6 mg/dL    Total Protein 7.1 6.4 - 8.2 g/dL    Albumin 3.6 3.4 - 5.0 g/dL    Albumin/Globulin Ratio 1.0 (L) 1.1 - 2.2    Total Bilirubin 1.8 (H) 0.0 - 1.0 mg/dL    Alkaline Phosphatase 296 (H) 40 - 129 U/L    ALT 13 10 - 40 U/L    AST 12 (L) 15 - 37 U/L    Globulin 3.5 g/dL       MDM:  On exam I find a chronically but not acutely ill obese female. She has bilateral lower extremity erythema induration warmth and drainage from approximately knee distally. Her left greater toe and second toe have open erosive wounds that are dry. She has several superficial ulcer type lesions that are currently dressed with petrolatum gauze. Unfortunately at this point the patient has bilateral cellulitis and meets sepsis criteria. She has responded to IV fluids and currently does not need pressor support. Plan is to get the patient admitted to [facility] to the wound care team can evaluate her. Case discussed with Dr. Terry Barthel who has agreed to accept admission. Differential diagnosis included but was not limited to: Sepsis, congestive heart failure, osteomyelitis, necrotizing fasciitis, stasis dermatitis, arterial or vascular insufficiency. Final Impression:  1. Severe sepsis ([address]    2.  Bilateral lower leg cellulitis        (Please note that portions of this note may have been completed with a voice recognition program. Efforts were made to edit the dictations but occasionally words are mis-transcribed.)    MD Princess Gina Gordon MD  09/09/21 1227

## 2021-09-07 NOTE — TELEPHONE ENCOUNTER
Writer contacted Dr. Amadou Kirkland to inform of 30 day readmission risk. Dr. Amadou Kirkland informed writer of readmission for bilateral cellulitis and low pressures.

## 2021-09-07 NOTE — CONSULTS
Pharmacy to dose vancomycin per Dr. Sobeida Murray. Please start vancomycin 1500 mg ivpb x1 dose now. Further dosing as requested if patient is admitted to hospital.  [address] Ph.  9/7/2021 6:11 PM

## 2021-09-07 NOTE — ED PROVIDER NOTES
[facility]      CHIEF COMPLAINT  Leg Pain (Pt states that she was sent in by [facility] for possible cellulitis in bilateral lower legs. ) and Cellulitis       HISTORY OF PRESENT ILLNESS  Kristal Ojeda is a 60 y.o. female  who presents to the ED complaining of bilateral lower extremity edema and worsening redness and pain. Patient states that she has had infection before but never this bad. She states that her legs are weeping and become more swollen and red and very painful. She was given Norco at her nursing facility with only mild improvement in pain. The nurse practitioner at her facility has been providing wound care and wrapping her legs but it has worsened despite that. No vomiting. Patient does have a history of end-stage renal disease on dialysis and has not missed any dialysis sessions. She states that she has peripheral vascular disease and follows with Dr. John Vincent for her vascular care. No other complaints, modifying factors or associated symptoms. I have reviewed the following from the nursing documentation.     Past Medical History:   Diagnosis Date    Acute blood loss anemia 8/5/2020    Acute kidney injury ([address] 12/25/2019    Acute kidney injury superimposed on CKD ([address] 8/5/2020    Acute on chronic combined systolic and diastolic congestive heart failure ([address] 1/10/2020    Acute on chronic right-sided heart failure ([address] 08/2020    Alcohol dependence ([address] 3/10/2010    Angina pectoris (HCC)     Arthritis     Asthma     Atrial fibrillation (HCC)     CAD (coronary artery disease)     Cardiomyopathy ([address]     Cellulitis 6/3/2020    Charcot's joint of ankle, left     Chronic kidney disease     COPD (chronic obstructive pulmonary disease) ([address]     Diabetic ulcer of left foot associated with type 2 diabetes mellitus, limited to breakdown of skin ([address] 1/18/2020    Diabetic ulcer of toe of right foot associated with type 2 diabetes mellitus ([address] 1/18/2020    Enthesopathy     GERD (gastroesophageal reflux disease)     Hyperlipidemia     Hypertension     Left renal artery stenosis ([address] 08/2020    MI (myocardial infarction) ([address] 10/07/2019    NSTEMI    Morbid obesity (HCC)     Osteoarthritis     Peripheral neuropathy     Peripheral vascular disease (HCC)     Polyarthritis     Positive FIT (fecal immunochemical test) 2/28/2020    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 steal syndrome 09/01/2020    Traumatic perinephric hematoma, left, initial encounter 8/4/2020     Past Surgical History:   Procedure Laterality Date    ARTERIAL BYPASS SURGRY Left 01/06/2016    Axillary/Subclavian to Brachial Bypass w/reversed SVG    CARDIAC CATHETERIZATION  03/27/2018    Dr. Loja Fraction - at [address]  01/20/2020    Dr. Mari Blancas      pancreatitis post surgery    CORONARY ANGIOPLASTY WITH STENT PLACEMENT  03/16/2018    MELODY- 3.5 x 38 and 3.5 x 20 to Cx    CORONARY ANGIOPLASTY WITH STENT PLACEMENT  01/03/2018    MELODY- 3.0 x 38 and 2.75 x 26 and 2.75 x 8 and 2.75 x 22 to the LAD    CORONARY ANGIOPLASTY WITH STENT PLACEMENT  10/07/2019    MELODY- 2.5 x 8 to mLAD    CORONARY ARTERY BYPASS GRAFT N/A 01/27/2020    CORONARY ARTERY BYPASS GRAFTING X 1, ON PUMP BEATING HEART, INTERNAL MAMMARY ARTERY TO LEFT ANTERIOR DESCENDING ARTERY, VAS CATH INSERTION, URI, PLATELET GEL APPLICATION, 5 LEVEL BILATERAL INTERCOSTAL NERVE BLOCK, STERNAL PLATING performed by Rekha Villanueva MD at [address] [place] [address] Right 05/16/2019    fem-pop, performed by Janell Hua MD at [address]  02/14/2019    CardioMEMs insertion for CHF [id number]  Serial #[id number]  MRI Conditional    IR NONTUNNELED VASCULAR CATHETER  07/09/2021    IR NONTUNNELED VASCULAR CATHETER 7/9/2021 [facility] PROCED[facility]    IR TUNNELED CATHETER PLACEMENT GREATER THAN 5 YEARS  7/29/2021    IR TUNNELED CATHETER PLACEMENT GREATER THAN 5 YEARS 7/29/2021 [facility]    ROTATOR CUFF REPAIR Left 04/22/2016    TONSILLECTOMY      TRANSESOPHAGEAL ECHOCARDIOGRAM  01/26/2020    TRANSLUMINAL ANGIOPLASTY Left 10/08/2019    with stenting, at [facility]    TRANSLUMINAL ANGIOPLASTY Left 04/29/2020    of the [facility] re-occlusion    TUMOR EXCISION      benign tumors X 2 chest & axilla    UPPER GASTROINTESTINAL ENDOSCOPY  04/25/2013    BX barretts, HH, gastritis    UPPER GASTROINTESTINAL ENDOSCOPY  12/10/2015    UPPER GASTROINTESTINAL ENDOSCOPY  01/06/2017    Gastritis    VASCULAR SURGERY Left 12/08/2015    upper extremity and mesenteric angiogram (diagnostic only)    VASCULAR SURGERY Bilateral 07/07/2020    diagnostic lower extremity angiogram only    VASCULAR SURGERY Left 08/04/2020    angioplasty and stent of renal artery    VASCULAR SURGERY Left 08/05/2020    coil embolization of branch renal artery vessel for bleeding    VASCULAR SURGERY Left 09/01/2020    angioplasty and stenting of subclavian artery     Family History   Problem Relation Age of Onset    Heart Disease Maternal Grandmother     Cancer Mother         lung and brain cancer    Cancer Maternal Aunt         lung and brain cancer     Social History     Socioeconomic History    Marital status: Single     Spouse name: Not on file    Number of children: Not on file    Years of education: Not on file    Highest education level: Not on file   Occupational History    Not on file   Tobacco Use    Smoking status: Current Every Day Smoker     Packs/day: 1.00     Years: 30.00     Pack years: 30.00     Types: Cigarettes    Smokeless tobacco: Never Used   Vaping Use    Vaping Use: Never used   Substance and Sexual Activity    Alcohol use: Not Currently     Comment: quit 2006     Drug use: Never    Sexual activity: Yes     Partners: Male   Other Topics Concern    Not on file   Social History Narrative    Not on file     Social Determinants of Health Financial Resource Strain:     Difficulty of Paying Living Expenses:    Food Insecurity:     Worried About Running Out of Food in the Last Year:     [address] in the Last Year:    Transportation Needs:     Lack of Transportation (Medical):  Lack of Transportation (Non-Medical):    Physical Activity:     Days of Exercise per Week:     Minutes of Exercise per Session:    Stress:     Feeling of Stress :    Social Connections:     Frequency of Communication with Friends and Family:     Frequency of Social Gatherings with Friends and Family:     Attends Sikhism Services:     Active Member of Clubs or Organizations:     Attends Club or Organization Meetings:     Marital Status:    Intimate Partner Violence:     Fear of Current or Ex-Partner:     Emotionally Abused:     Physically Abused:     Sexually Abused:      Current Facility-Administered Medications   Medication Dose Route Frequency Provider Last Rate Last Admin    ceFEPIme (MAXIPIME) 2,000 mg in sterile water 20 mL IV syringe  2,000 mg IntraVENous Once Song Livingston MD        0.9 % sodium chloride IV bolus 1,000 mL  1,000 mL IntraVENous Once Song Livingston MD 1,000 mL/hr at 09/07/21 1856 1,000 mL at 09/07/21 1856    vancomycin (VANCOCIN) 1,500 mg in dextrose 5 % 500 mL IVPB  1,500 mg IntraVENous Once Song Livingston MD         Current Outpatient Medications   Medication Sig Dispense Refill    amiodarone (CORDARONE) 200 MG tablet Take 200 mg by mouth daily      ARIPiprazole (ABILIFY) 15 MG tablet Take 15 mg by mouth daily      busPIRone (BUSPAR) 30 MG tablet Take 30 mg by mouth 2 times daily      carvedilol (COREG) 3.125 MG tablet Take 3.125 mg by mouth 2 times daily (with meals)      doxepin (SINEQUAN) 25 MG capsule Take 25 mg by mouth nightly      cephALEXin (KEFLEX) 500 MG capsule Take 1 capsule by mouth 3 times daily       HYDROcodone-acetaminophen (NORCO) 5-325 MG per tablet Take by mouth every 8 hours as needed.  albuterol sulfate  (90 Base) MCG/ACT inhaler       silver sulfADIAZINE (SILVADENE) 1 % cream Apply topically daily Apply topically daily.  Insulin Degludec (TRESIBA FLEXTOUCH) 100 UNIT/ML SOPN Inject 5 Units into the skin nightly (Patient not taking: Reported on 8/31/2021) 10 pen 5    hydrOXYzine (VISTARIL) 25 MG capsule Take 10 mg by mouth 3 times daily as needed for Itching (Patient not taking: Reported on 8/31/2021)      metoprolol tartrate (LOPRESSOR) 25 MG tablet Take 0.5 tablets by mouth 2 times daily (Patient not taking: Reported on 8/31/2021)      torsemide (DEMADEX) 100 MG tablet Take 1 tablet by mouth daily (Patient not taking: Reported on 8/31/2021)      b complex-C-folic acid (NEPHROCAPS) 1 MG capsule Take 1 capsule by mouth daily (Patient not taking: Reported on 8/31/2021)      midodrine (PROAMATINE) 10 MG tablet Take 1 tablet by mouth 3 times daily (with meals) (Patient not taking: Reported on 8/31/2021)      pantoprazole (PROTONIX) 40 MG tablet Take 1 tablet by mouth daily      acetaminophen (TYLENOL) 650 MG extended release tablet Take 650 mg by mouth every 4 hours as needed for Pain      Umeclidinium Bromide (INCRUSE ELLIPTA) 62.5 MCG/INH AEPB Inhale 1 Dose into the lungs daily (Patient not taking: Reported on 8/31/2021)      fluticasone-salmeterol (ADVAIR) 250-50 MCG/DOSE AEPB Inhale 1 puff into the lungs 2 times daily (Patient not taking: Reported on 8/31/2021)      apixaban (ELIQUIS) 5 MG TABS tablet Take 1 tablet by mouth 2 times daily (Patient taking differently: Take 2.5 mg by mouth 2 times daily ) 60 tablet     miconazole (MICOTIN) 2 % powder Apply topically 2 times daily.  (Patient not taking: Reported on 8/31/2021) 45 g 1    Blood Glucose Monitoring Suppl (ONE TOUCH ULTRA 2) w/Device KIT USE TO MONITOR BLOOD GLUCOSE LEVELS 1 kit 0    ONE TOUCH ULTRASOFT LANCETS MISC USE TO TEST BLOOD GLUCOSE LEVELS 4 TIMES DAILY (Patient not taking: Reported on 8/31/2021) 150 each 3    blood glucose test strips (ASCENSIA AUTODISC VI;ONE TOUCH ULTRA TEST VI) strip USE TO MONITOR BLOOD GLUCOSE LEVELS 4 TIMES DAILY 150 each 3    nitroGLYCERIN (NITROSTAT) 0.4 MG SL tablet Place 1 tablet under the tongue every 5 minutes as needed for Chest pain up to max of 3 total doses. If no relief after 1 dose, call 911. 25 tablet 0    Insulin Pen Needle (B-D ULTRAFINE III SHORT PEN) 31G X 8 MM MISC Five shots a day 200 each 5    atorvastatin (LIPITOR) 80 MG tablet TAKE 1 TABLET BY MOUTH EVERY DAY AT NIGHT (Patient not taking: Reported on 8/31/2021) 90 tablet 3    blood glucose test strips (FREESTYLE LITE) strip USE TO CHECK BLOOD GLUCOSE LEVELS FOUR TIMES DAILY 200 strip 10    benztropine (COGENTIN) 1 MG tablet Take 1 mg by mouth nightly  (Patient not taking: Reported on 8/31/2021)      INSULIN SYRINGE .5CC/29G 29G X 1/2\" 0.5 ML MISC 1 each by Does not apply route daily 100 each 3    clopidogrel (PLAVIX) 75 MG tablet TAKE 1 TABLET BY MOUTH EVERY DAY 30 tablet 5    lamoTRIgine (LAMICTAL) 200 MG tablet Take 200 mg by mouth 2 times daily        Allergies   Allergen Reactions    Lisinopril Other (See Comments)     Severe hypotension       REVIEW OF SYSTEMS  10 systems reviewed, pertinent positives per HPI otherwise noted to be negative. PHYSICAL EXAM  BP (!) 83/55   Pulse 86   Temp 98.6 °F (37 °C) (Oral)   Resp 22   Wt 241 lb (109.3 kg)   LMP 10/24/2012   SpO2 92%   BMI 41.37 kg/m²    GENERAL APPEARANCE: Awake and alert. Cooperative. No acute distress. Obese. HENT: Normocephalic. Atraumatic. Mucous membranes are moist.  No drooling or stridor. NECK: Supple. EYES: PERRL. EOM's grossly intact. HEART/CHEST: RRR. No murmurs. Dialysis catheter noted to the right upper chest wall without any surrounding drainage or bleeding. Ecchymoses noted to the anterior chest wall superiorly. LUNGS: Respirations unlabored. CTAB. Good air exchange. Speaking comfortably in full sentences. ABDOMEN: No tenderness. Soft. Non-distended. No masses. No organomegaly. No guarding or rebound. MUSCULOSKELETAL: Compartments soft. No deformity. Tenderness in the bilateral lower extremities from just below the knees down to the ankles with significant circumferential erythema, warmth and tenderness with multiple draining ulcerated wounds draining serous fluid. The left foot has multiple ulcerations noted distally to the toes. .  All extremities neurovascularly intact. Multiple areas of ecchymoses noted to the bilateral upper extremities. SKIN: Warm and dry. No acute rashes. NEUROLOGICAL: Alert and oriented. CN's 2-12 intact. No gross focal deficits. PSYCHIATRIC: Normal mood and affect. LABS  I have reviewed all labs for this visit. Results for orders placed or performed during the hospital encounter of 09/07/21   Lactate, Sepsis   Result Value Ref Range    Lactic Acid, Sepsis 2.7 (H) 0.4 - 1.9 mmol/L   CBC Auto Differential   Result Value Ref Range    WBC 9.6 4.0 - 11.0 K/uL    RBC 3.47 (L) 4.00 - 5.20 M/uL    Hemoglobin 9.2 (L) 12.0 - 16.0 g/dL    Hematocrit 29.5 (L) 36.0 - 48.0 %    MCV 85.0 80.0 - 100.0 fL    MCH 26.7 26.0 - 34.0 pg    MCHC 31.4 31.0 - 36.0 g/dL    RDW 25.5 (H) 12.4 - 15.4 %    Platelets 036 267 - 211 K/uL    MPV 8.8 5.0 - 10.5 fL    Neutrophils % 87.2 %    Lymphocytes % 6.0 %    Monocytes % 5.1 %    Eosinophils % 1.0 %    Basophils % 0.7 %    Neutrophils Absolute 8.3 (H) 1.7 - 7.7 K/uL    Lymphocytes Absolute 0.6 (L) 1.0 - 5.1 K/uL    Monocytes Absolute 0.5 0.0 - 1.3 K/uL    Eosinophils Absolute 0.1 0.0 - 0.6 K/uL    Basophils Absolute 0.1 0.0 - 0.2 K/uL       RADIOLOGY  xr left foot pending. ED COURSE/MDM  Patient seen and evaluated. Old records reviewed. Labs and imaging reviewed and results discussed with patient. Patient presenting for evaluation of possible cellulitis to the bilateral lower extremities and does have circumferential cellulitis and edema.   She was noted to be initially hypotensive upon arrival and we will fluid resuscitate and reevaluate after initial 1 L of fluids. I will be somewhat judicious and administer fluids and 1 L aliquots that she does have a history of end-stage renal disease on dialysis as well as CHF with EF last documented of 25%. I felt that the risk of performing full fluid resuscitation with 30 mL/kg initially outweighed the benefit and therefore started with 1 L fluid boluses. At this time, initial fluid resuscitation is in progress. Broad-spectrum antibiotics ordered along with blood work including lactic acid levels and blood cultures x2. Patient will require inpatient admission for further evaluation and management once initial evaluation and stabilization is performed. SEP-1 CORE MEASURE DATA    Amount of fluids ordered: less than 30mL/kg because of a history of ESRD or stage IV/V CKD. Instead, 1000 ml was ordered. More fluid initially would be potentially detrimental to the patient    Time at which sepsis was identified: 1740 at time of evaluation/exam    Broad-spectrum antibiotics chosen: Vancomycin/Cefepime based on suspected source of: Skin and Soft Tissue    7:00 PM: I discussed the history, physical, and treatment plan with Dr. Katerina Yung. Marcy Mauricio was signed out in stable condition. Please see Dr. Bette Acharya note for further details, including diagnosis and disposition. During the patient's ED course, the patient was given:  Medications   ceFEPIme (MAXIPIME) 2,000 mg in sterile water 20 mL IV syringe (has no administration in time range)   0.9 % sodium chloride IV bolus 1,000 mL (1,000 mLs IntraVENous New Bag 9/7/21 1856)   vancomycin (VANCOCIN) 1,500 mg in dextrose 5 % 500 mL IVPB (has no administration in time range)   oxyCODONE-acetaminophen (PERCOCET) 5-325 MG per tablet 2 tablet (2 tablets Oral Given 9/7/21 1855)        CLINICAL IMPRESSION  1. Severe sepsis ([address]    2.  Bilateral lower leg cellulitis Blood pressure (!) 83/55, pulse 86, temperature 98.6 °F (37 °C), temperature source Oral, resp. rate 22, weight 241 lb (109.3 kg), last menstrual period 10/24/2012, SpO2 92 %, not currently breastfeeding. DISCLAIMER: This chart was created using Dragon dictation software. Efforts were made by me to ensure accuracy, however some errors may be present due to limitations of this technology and occasionally words are not transcribed correctly.         Gordon Grijalva MD  09/07/21 3083

## 2021-09-07 NOTE — DISCHARGE SUMMARY
Name:  Tomym Sol  Room:  0216/0216-01  MRN:    [id number]    IM Discharge Summary    Discharging Physician:  Siobhan White MD    Admit: 8/7/2021    Discharge:  8/13/2021    PCP      Ketan Kc PA-C    Diagnoses and hospital course  this Admission       Acute pancreatitis  - etiology unclear - no alcohol use, no obstructive pathology. TGL normal  - conservative mx with NPO, IVF , pain control  -improved, started clears and advanced once pain improved  - GI plans for outpt workup   - low fat diet recommended     ESRD - recently initiated on HD  Massive peripheral edema slightly improved from last adm- had  fluid removal with  daily UF     Chronic sCHF  - Last EF 25% 8/2020.    -diureitics as tolerated, pt on HD    - Not on ACE/ARB given ESRD     CAD s/p CABG, stents.    - Elevated troponin 2/2 increased demand and chronic kidney disease.  - resumed ASA, statin      History of PAF  - Currently in NSR    -resumed  Eliquis     Type 2 diabetes  - Controlled  - Placed on sliding scale insulin  - BG are  stable     COPD  - Stable  - Continue inhalers.     - Anemia secondary to chronic kidney disease.  - monitored      PPI and eliquis  Started PT recommends SNF but pt refused         HPI 60 y.o. female who presents to [address] with abd pain. Pt states that she developed abd pain - mid abd, radiating to back a few days ago, yesterday, abd pain was associated with nausea/vomiting and hence presented to the ER. Found to have acute pancreatitis. Denies ETOH abuse, states she has had her GB surgery in the past    Physical Exam at Discharge:      General:  Middle aged obese female, sitting up    Awake, alert and oriented.  Appears to be not in any distress  Mucous Membranes:  Pink , anicteric  Neck: No JVD, no carotid bruit, no thyromegaly  Chest:  Clear to auscultation bilaterally, diminished in bases  Cardiovascular:  RRR S1S2 heard, no murmurs or gallops  Abdomen:  Soft, undistended, non tender, no organomegaly, BS present   diffuse edema of abd wall and LE 3 + with some small blisters  Dressing to wounds on left UE and Left LE   Right chest HD cahether  Extremities: severe peripheral edema improving     Distal pulses well felt  Neurological : grossly normal       Radiology (Please Review Full Report for Details)            Chest xray Mild pulmonary vascular congestion. Stable mild cardiomegaly.      Ct abd     1. Acute pancreatitis. 2. Cirrhosis. 3. Trace ascites. 4. Trace bilateral pleural effusions.        Consults:    1. GI  2. nephrology                  No results for input(s): WBC, HGB, HCT, MCV, PLT in the last 72 hours. No results for input(s): NA, K, CL, CO2, PHOS, BUN, CREATININE in the last 72 hours.     Invalid input(s): CA    CBC:  Lab Results   Component Value Date    WBC 7.5 08/12/2021    HGB 8.5 08/12/2021    HCT 28.1 08/12/2021    MCV 82.1 08/12/2021     08/12/2021    NEUTOPHILPCT 81.9 08/08/2021    LYMPHOPCT 9.1 08/08/2021    MONOPCT 7.4 08/08/2021    EOSPCT 3 06/12/2018    BASOPCT 0.4 08/08/2021    NEUTROABS 4.9 08/08/2021    LYMPHSABS 0.5 08/08/2021    MONOSABS 0.4 08/08/2021    EOSABS 0.1 08/08/2021    BASOSABS 0.0 08/08/2021     BNP:   Lab Results   Component Value Date     08/30/2021    K 3.2 08/30/2021    K 4.7 08/11/2021    CO2 19 08/30/2021    BUN 31 08/30/2021    CREATININE 3.2 08/30/2021    CALCIUM 8.3 08/30/2021                Medication List      CHANGE how you take these medications    apixaban 5 MG Tabs tablet  Commonly known as: ELIQUIS  Take 1 tablet by mouth 2 times daily  What changed: how much to take     [place] FlexTouch 100 UNIT/ML Sopn  Generic drug: Insulin Degludec  Inject 5 Units into the skin nightly  What changed: how much to take        CONTINUE taking these medications    acetaminophen 650 MG extended release tablet  Commonly known as: TYLENOL     atorvastatin 80 MG tablet  Commonly known as: LIPITOR  TAKE 1 TABLET BY MOUTH EVERY DAY AT NIGHT     b complex-C-folic acid 1 MG capsule  Take 1 capsule by mouth daily     B-D ULTRAFINE III SHORT PEN 31G X 8 MM Misc  Generic drug: Insulin Pen Needle  Five shots a day     benztropine 1 MG tablet  Commonly known as: COGENTIN     clopidogrel 75 MG tablet  Commonly known as: PLAVIX  TAKE 1 TABLET BY MOUTH EVERY DAY     fluticasone-salmeterol 250-50 MCG/DOSE Aepb  Commonly known as: ADVAIR     * FREESTYLE LITE strip  Generic drug: blood glucose test strips  USE TO CHECK BLOOD GLUCOSE LEVELS FOUR TIMES DAILY     * blood glucose test strips strip  Commonly known as: ASCENSIA AUTODISC VI;ONE TOUCH ULTRA TEST VI  USE TO MONITOR BLOOD GLUCOSE LEVELS 4 TIMES DAILY     hydrOXYzine 25 MG capsule  Commonly known as: VISTARIL     Incruse Ellipta 62.5 MCG/INH Aepb  Generic drug: Umeclidinium Bromide     INSULIN SYRINGE .5CC/29G 29G X 1/2\" 0.5 ML Misc  1 each by Does not apply route daily     lamoTRIgine 200 MG tablet  Commonly known as: LAMICTAL     metoprolol tartrate 25 MG tablet  Commonly known as: LOPRESSOR  Take 0.5 tablets by mouth 2 times daily     miconazole 2 % powder  Commonly known as: MICOTIN  Apply topically 2 times daily. midodrine 10 MG tablet  Commonly known as: PROAMATINE  Take 1 tablet by mouth 3 times daily (with meals)     nitroGLYCERIN 0.4 MG SL tablet  Commonly known as: NITROSTAT  Place 1 tablet under the tongue every 5 minutes as needed for Chest pain up to max of 3 total doses. If no relief after 1 dose, call 911. ONE TOUCH ULTRA 2 w/Device Kit  USE TO MONITOR BLOOD GLUCOSE LEVELS     ONE TOUCH ULTRASOFT LANCETS Misc  USE TO TEST BLOOD GLUCOSE LEVELS 4 TIMES DAILY     pantoprazole 40 MG tablet  Commonly known as: PROTONIX  Take 1 tablet by mouth daily     torsemide 100 MG tablet  Commonly known as: DEMADEX  Take 1 tablet by mouth daily         * This list has 2 medication(s) that are the same as other medications prescribed for you.  Read the directions carefully, and ask your doctor or other care provider to review them with you. ASK your doctor about these medications    HYDROcodone-acetaminophen 5-325 MG per tablet  Commonly known as: NORCO  Take 1 tablet by mouth every 6 hours as needed for Pain for up to 3 days. Ask about: Should I take this medication?     polyethylene glycol 17 g packet  Commonly known as: GLYCOLAX  Take 17 g by mouth daily  Ask about: Should I take this medication? Where to Get Your Medications      You can get these medications from any pharmacy    Bring a paper prescription for each of these medications  · HYDROcodone-acetaminophen 5-325 MG per tablet     Information about where to get these medications is not yet available    Ask your nurse or doctor about these medications  · [place] FlexTouch 100 UNIT/ML Sopn           Discharge Condition/Location: stable/home     Follow Up:    PCP in 1 weeks      Time Spent on discharge is more than 30 minutes in the examination, evaluation, counseling and review of medications and discharge plan.       Pillo Penaloza MD, 9/7/2021 2:21 PM

## 2021-09-08 PROBLEM — K85.90 ACUTE PANCREATITIS WITHOUT INFECTION OR NECROSIS: Status: RESOLVED | Noted: 2021-01-01 | Resolved: 2021-01-01

## 2021-09-08 PROBLEM — E87.6 HYPOKALEMIA: Status: RESOLVED | Noted: 2020-01-01 | Resolved: 2021-01-01

## 2021-09-08 PROBLEM — K85.00 IDIOPATHIC ACUTE PANCREATITIS: Status: RESOLVED | Noted: 2021-01-01 | Resolved: 2021-01-01

## 2021-09-08 PROBLEM — L03.90 CELLULITIS: Status: RESOLVED | Noted: 2021-01-01 | Resolved: 2021-01-01

## 2021-09-08 PROBLEM — I50.23 ACUTE ON CHRONIC SYSTOLIC CHF (CONGESTIVE HEART FAILURE) (HCC): Status: RESOLVED | Noted: 2021-01-01 | Resolved: 2021-01-01

## 2021-09-08 PROBLEM — I50.21 ACUTE SYSTOLIC CHF (CONGESTIVE HEART FAILURE) (HCC): Status: RESOLVED | Noted: 2021-01-01 | Resolved: 2021-01-01

## 2021-09-08 PROBLEM — N17.9 ACUTE RENAL FAILURE (HCC): Status: RESOLVED | Noted: 2020-08-05 | Resolved: 2021-01-01

## 2021-09-08 PROBLEM — L97.911 ULCERS OF BOTH LOWER LEGS, LIMITED TO BREAKDOWN OF SKIN (HCC): Status: ACTIVE | Noted: 2020-06-22

## 2021-09-08 PROBLEM — L97.911 ULCER OF RIGHT LEG, LIMITED TO BREAKDOWN OF SKIN (HCC): Status: RESOLVED | Noted: 2020-06-22 | Resolved: 2021-01-01

## 2021-09-08 PROBLEM — I50.9 HEART FAILURE (HCC): Status: RESOLVED | Noted: 2021-01-01 | Resolved: 2021-01-01

## 2021-09-08 PROBLEM — I50.9 ACUTE ON CHRONIC CONGESTIVE HEART FAILURE (HCC): Status: RESOLVED | Noted: 2020-02-28 | Resolved: 2021-01-01

## 2021-09-08 PROBLEM — E87.5 ACUTE HYPERKALEMIA: Status: RESOLVED | Noted: 2020-06-29 | Resolved: 2021-01-01

## 2021-09-08 PROBLEM — I89.0 LYMPHEDEMA OF BOTH LOWER EXTREMITIES: Status: ACTIVE | Noted: 2020-06-22

## 2021-09-08 PROBLEM — E87.1 ACUTE HYPONATREMIA: Status: RESOLVED | Noted: 2018-05-23 | Resolved: 2021-01-01

## 2021-09-08 PROBLEM — N17.9 AKI (ACUTE KIDNEY INJURY) (HCC): Status: RESOLVED | Noted: 2019-09-29 | Resolved: 2021-01-01

## 2021-09-08 NOTE — PROGRESS NOTES
Inpatient Physical Therapy Evaluation and Treatment    Unit: PCU  Date:  9/8/2021  Patient Name:    Yony Bautista  Admitting diagnosis:  Cellulitis [L03.90]  Bilateral lower leg cellulitis [S42.541, I35.933]  Severe sepsis ([facility] [address] [A41.9, R65.20]  Admit Date:  9/7/2021  Precautions/Restrictions/WB Status/ Lines/ Wounds/ Oxygen: Fall risk, Bed/chair alarm, Lines -IV, Telemetry and bilateral lower legs weeping wounds, buttock pressure sore    Treatment Time: 1145 - 1211  Treatment Number:  1   Timed Code Treatment Minutes: 16 minutes  Total Treatment Minutes:  26  minutes    Patient Goals for Therapy: \" Go to Rehab \"          Discharge Recommendations: SNF  DME needs for discharge: defer to facility       Therapy recommendation for EMS Transport: can transport by wheelchair    Therapy recommendations for staff:   Assist of 1 with use of rolling walker (RW) and gait belt for all transfers and ambulation to/from [facility]  to/from [facility]    History of Present Illness: H & P as per Alpha Boeck, MD's note dated 9/7/2021: Alexandre Joshi is a 60 y.o. female  who presents to the ED complaining of bilateral lower extremity edema and worsening redness and pain. Patient states that she has had infection before but never this bad. She states that her legs are weeping and become more swollen and red and very painful. She was given Norco at her nursing facility with only mild improvement in pain. The nurse practitioner at her facility has been providing wound care and wrapping her legs but it has worsened despite that. No vomiting. Patient does have a history of end-stage renal disease on dialysis and has not missed any dialysis sessions. She states that she has peripheral vascular disease and follows with Dr. Elgin Quiles for her vascular care.     Home Health S4 Level Recommendation:  Level 3 Safety  AM-PAC Mobility Score    AM-PAC Inpatient Mobility Raw Score : 15       Preadmission Environment  Admitted from [facility] stay at [facility], plans to return at d/c. Pt. Candelario Barnett friend Edis (able to provide 24/ assist if needed)  Home environment:    mobile home/trailer  Steps to enter first floor:   7 steps to enter     With bilateral HRs  Steps to second floor: N/A  Bathroom:       Tub/Shower unit and Tub Transfer Bench, standard height toilet, Pt uses BSC in living room  Equipment owned:      SPC, Standard Walker (SW), RW, rollator, BSC (too narrow), recliner chair  Pt sleeps on couch at home     Preadmission Status / PLOF:  History of falls            Yes, fell at [facility] while reaching for something on floor, injured L ear and required stitches  Pt. Able to drive          Yes, prior to last month of illness (hospitalization and STR stay)  Pt Fully independent with Mona Ayala assists with LB dressing  Pt. Required assistance from family for:  Cleaning, Cooking and Laundry   Pt's  friend performs most tasks, patient folds laundry and sometimes does dishes  Pt. Fully independent for transfers and gait and walked with: No Device at home, has been using RW at [employer] to watch TV  Retired from [employer] and [employer] work     Pain   Yes  Location: lower legs   Ratin /10  Pain Medicine Status: RN notified    Cognition    A&O x4   Able to follow 2 step commands    Subjective  Patient sitting up in chair with visitor present. Pt agreeable to this PT eval & tx. Upper Extremity ROM/Strength  Please see OT evaluation.       Lower Extremity ROM / Strength   AROM WFL: Yes  ROM limitations: WFL    Strength Assessment (measured on a 0-5 scale):  R LE   Quad   3+   Ant Tib  3+   Hamstring 3   Iliopsoas 3+  L LE  Quad   3   Ant Tib  3+   Hamstring 3-   Iliopsoas 3+    Lower Extremity Sensation    Diminished    Lower Extremity Proprioception:   WFL    Coordination and Tone  WFL    Balance  Sitting:  Fair ; CGA  Comments:     Standing: Fair -; Min A   Comments: ~3 min    Bed Mobility   Supine to Sit:    Not Tested  Sit to Supine:   Not Tested  Rolling:   Not Tested  Scooting in sitting: Mod A  and 2 persons  Scooting in supine:  Not Tested    Transfer Training     Sit to stand:   Min A   Stand to sit:   Min A   Bed to Chair:   Mod A  with use of gait belt and rolling walker (RW)    Gait gait completed as indicated below  Distance:      3 ft  Deviations (firm surface/linoleum):  decreased tamir, increased GABNIO, forward flexed posture, decreased step length bilaterally and decreased stance time bilaterally  Assistive Device Used:    gait belt and rolling walker (RW)  Level of Assist:    Mod A   Comment: Patient limited in walking distance due to lower extremity pain. Stair Training deferred, pt unsafe/ not appropriate to complete stairs at this time     Activity Tolerance   Pt completed therapy session with SOB noted with standing and transfer activities   SpO2: 98% on RA  HR: 112 bpm    Positioning Needs   Pt up in chair, alarm set, positioned in proper neutral alignment and pressure relief provided. Call light provided and all needs within reach    Exercises Initiated  all completed bilaterally unless indicated  Ankle Pumps x 10 reps  LAQ x 10 reps    Other  None. Patient/Family Education   Pt educated on role of inpatient PT, POC, importance of continued activity, DC recommendations, safety awareness, transfer techniques, pursed lip breathing, energy conservation, pacing activity and calling for assist with mobility. Assessment  Pt seen for Physical Therapy evaluation in acute care setting. Pt demonstrated decreased Activity tolerance, Balance, Safety and Strength as well as decreased independence with Ambulation, Bed Mobility  and Transfers.      Recommending SNF upon discharge as patient functioning well below baseline, demonstrates good rehab potential and unable to return home due to limited or no family support, inability to negotiate stairs to enter home/bedroom/bathroom, burden of care beyond caregiver ability, home environment not conducive to patient recovery and limited safety awareness. Goals : To be met in 3 visits:  1). Independent with LE Ex x 10 reps    To be met in 6 visits:  1). Supine to/from sit: CGA  2). Sit to/from stand: CGA  3). Bed to chair: Min A   4). Gait: Ambulate 50 ft.  with  CGA and use of LRAD (least restrictive assistive device)  5). Tolerate B LE exercises 3 sets of 10-15 reps  6). Ascend/descend 7 steps with Min A  with use of hand rail bilateral and LRAD (least restrictive assistive device)    Rehabilitation Potential: Fair  Strengths for achieving goals include:   Pt motivated and Pt cooperative   Barriers to achieving goals include:    Complexity of condition and Pain    Plan    To be seen 3-5 x / week  while in acute care setting for therapeutic exercises, bed mobility, transfers, progressive gait training, balance training, and family/patient education. Signature: Tessy Quiroz MS PT, # [id number]    If patient discharges from this facility prior to next visit, this note will serve as the Discharge Summary.

## 2021-09-08 NOTE — PROGRESS NOTES
Morning shift assessment completed, medications given. Patients BLE dressings removed per Dr. Jurgen Vines who states to keep off for Dr. Abdirashid Obregon to see them. Patient moved to recliner with standby assist, legs up and reclined. Patient stable, Call light and table in reach.   Lisa Ferraro RN

## 2021-09-08 NOTE — CONSULTS
CONSULT    Admit Date:  9/7/2021    Subjective:  60 y.o. female who is seen for evaluation osteomyelitis left foot. Had xray at ER which showed possible osteomyelitis. States her toes have had sores on them off and on for a long time. States they do not look any worse then her normal. Patient states she has swelling in her legs regularly. States left leg may be a little darker then her normal today but not much different. States that her legs do hurt with pressure which is normal for her. States that she has seen Dr. Deepthi Garcia in the past for her PVD however has not seen him recently due to other admissions to hospitals.          Past Medical History:        Diagnosis Date    Acute blood loss anemia 8/5/2020    Acute kidney injury ([address] 12/25/2019    Acute kidney injury superimposed on CKD ([address] 8/5/2020    Acute on chronic combined systolic and diastolic congestive heart failure ([address] 1/10/2020    Acute on chronic right-sided heart failure ([address] 08/2020    Alcohol dependence ([address] 3/10/2010    Angina pectoris (HCC)     Arthritis     Asthma     Atrial fibrillation (HCC)     CAD (coronary artery disease)     Cardiomyopathy ([address]     Cellulitis 6/3/2020    Charcot's joint of ankle, left     Chronic kidney disease     COPD (chronic obstructive pulmonary disease) ([address]     Diabetic ulcer of left foot associated with type 2 diabetes mellitus, limited to breakdown of skin ([address] 1/18/2020    Diabetic ulcer of toe of right foot associated with type 2 diabetes mellitus ([address] 1/18/2020    Enthesopathy     GERD (gastroesophageal reflux disease)     Hyperlipidemia     Hypertension     Left renal artery stenosis ([address] 08/2020    MI (myocardial infarction) ([address] 10/07/2019    NSTEMI    Morbid obesity ([address]     Osteoarthritis     Peripheral neuropathy     Peripheral vascular disease ([address]     Polyarthritis     Positive FIT (fecal immunochemical test) 2/28/2020    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 steal syndrome 09/01/2020    Traumatic perinephric hematoma, left, initial encounter 8/4/2020       Past Surgical History:        Procedure Laterality Date    ARTERIAL BYPASS SURGRY Left 01/06/2016    Axillary/Subclavian to Brachial Bypass w/reversed SVG    CARDIAC CATHETERIZATION  03/27/2018    Dr. Savana Garcia - at [address]  01/20/2020    Dr. Vern Mccauley      pancreatitis post surgery    CORONARY ANGIOPLASTY WITH STENT PLACEMENT  03/16/2018    MELODY- 3.5 x 38 and 3.5 x 20 to Cx    CORONARY ANGIOPLASTY WITH STENT PLACEMENT  01/03/2018    MELODY- 3.0 x 38 and 2.75 x 26 and 2.75 x 8 and 2.75 x 22 to the LAD    CORONARY ANGIOPLASTY WITH STENT PLACEMENT  10/07/2019    MELODY- 2.5 x 8 to [address] GRAFT N/A 01/27/2020    CORONARY ARTERY BYPASS GRAFTING X 1, ON PUMP BEATING HEART, INTERNAL MAMMARY ARTERY TO LEFT ANTERIOR DESCENDING ARTERY, VAS CATH INSERTION, URI, PLATELET GEL APPLICATION, 5 LEVEL BILATERAL INTERCOSTAL NERVE BLOCK, STERNAL PLATING performed by Mauricio Johnson MD at [address] Right 05/16/2019    fem-pop, performed by Jason Daley MD at [address]  02/14/2019    CardioMEMs insertion for CHF [id number]  Serial #[id number]  MRI Conditional    IR NONTUNNELED VASCULAR CATHETER  07/09/2021    IR NONTUNNELED VASCULAR CATHETER 7/9/2021 [facility]    IR TUNNELED CATHETER PLACEMENT GREATER THAN 5 YEARS  7/29/2021    IR TUNNELED CATHETER PLACEMENT GREATER THAN 5 YEARS 7/29/2021 [facility]    ROTATOR CUFF REPAIR Left 04/22/2016    TONSILLECTOMY      TRANSESOPHAGEAL ECHOCARDIOGRAM  01/26/2020    TRANSLUMINAL ANGIOPLASTY Left 10/08/2019    with stenting, at [facility]    TRANSLUMINAL ANGIOPLASTY Left 04/29/2020    of the SFA re-occlusion    TUMOR EXCISION      benign tumors X 2 chest & axilla    UPPER GASTROINTESTINAL ENDOSCOPY  04/25/2013    [facility], gastritis    UPPER GASTROINTESTINAL ENDOSCOPY  12/10/2015    UPPER GASTROINTESTINAL ENDOSCOPY  01/06/2017    Gastritis    VASCULAR SURGERY Left 12/08/2015    upper extremity and mesenteric angiogram (diagnostic only)    VASCULAR SURGERY Bilateral 07/07/2020    diagnostic lower extremity angiogram only    VASCULAR SURGERY Left 08/04/2020    angioplasty and stent of renal artery    VASCULAR SURGERY Left 08/05/2020    coil embolization of branch renal artery vessel for bleeding    VASCULAR SURGERY Left 09/01/2020    angioplasty and stenting of subclavian artery       Current Medications:     apixaban  2.5 mg Oral BID    amiodarone  200 mg Oral Daily    ARIPiprazole  15 mg Oral Daily    atorvastatin  80 mg Oral Nightly    busPIRone  30 mg Oral BID    carvedilol  3.125 mg Oral BID WC    clopidogrel  75 mg Oral Daily    lamoTRIgine  200 mg Oral BID    pantoprazole  40 mg Oral Daily    sodium chloride flush  5-40 mL IntraVENous 2 times per day    insulin lispro  0-12 Units SubCUTAneous TID WC    insulin lispro  0-6 Units SubCUTAneous Nightly    midodrine  10 mg Oral TID WC    budesonide-formoterol  2 puff Inhalation BID    cefepime  1,000 mg IntraVENous Q24H    vancomycin (VANCOCIN) intermittent dosing (placeholder)   Other RX Placeholder       Allergies:  Lisinopril    Social History:    Social History     Tobacco Use    Smoking status: Current Every Day Smoker     Packs/day: 1.00     Years: 30.00     Pack years: 30.00     Types: Cigarettes    Smokeless tobacco: Never Used   Vaping Use    Vaping Use: Never used   Substance Use Topics    Alcohol use: Not Currently     Comment: quit 2006     Drug use: Never       Family History:       Problem Relation Age of Onset    Heart Disease Maternal Grandmother     Cancer Mother         lung and brain cancer    Cancer Maternal Aunt         lung and brain cancer       Review of Systems    CONSTITUTIONAL:  negative  EYES:  negative  HEENT: negative  RESPIRATORY:  negative  CARDIOVASCULAR:  negative  GASTROINTESTINAL:  negative  GENITOURINARY:  negative  INTEGUMENT/BREAST:  positive for wounds  MUSCULOSKELETAL:  positive for  pain  NEUROLOGICAL:  positive for numbness      Objective:   BP (!) 89/58   Pulse 89   Temp 97.4 °F (36.3 °C) (Oral)   Resp 16   Ht 5' 4\" (1.626 m)   Wt 239 lb 3.2 oz (108.5 kg)   LMP 10/24/2012   SpO2 99%   BMI 41.06 kg/m²     Data:  CBC:   Recent Labs     09/07/21 1835   WBC 9.6   HGB 9.2*   HCT 29.5*   MCV 85.0        BMP:   Recent Labs     09/07/21 1835   *   K 4.2   CL 95*   CO2 18*   BUN 33*   CREATININE 3.6*     LIVER PROFILE:   Recent Labs     09/07/21 1835   AST 12*   ALT 13   BILITOT 1.8*   ALKPHOS 296*     PT/INR:   Recent Labs     09/07/21 1835   PROT 7.1     HgBA1c:  Lab Results   Component Value Date    LABA1C 7.9 07/29/2021     SARS-CoV-2, NAATNot Detected     Cultures: Blood x2 - NGSF    Imaging: xray left foot 9/7/21 -   There is chronic attenuation of the 1st tuft, which is unchanged. There is   adjacent soft tissue swelling of the great toe. There is acute on chronic cortical attenuation of the 2nd tuft. There is a   new area of erosion. There is soft tissue swelling. No acute fractures are identified. There is more generalized soft tissue   swelling of the forefoot. Calcaneal enthesopathy. Moderate osteoarthritis   of the talonavicular joint. Impression:  Suspected osteomyelitis of the 2nd tuft. Chronic erosion of the 1st tuft. Cellulitis of the forefoot. Arterial US bilateral LE 6/18/21 -   Right Impression The right ankle/brachial index is 0.9 (the DP is 90 and the PT is 100 mmHg). The common femoral artery demonstrates multiphasic flow indicating no significant aortic-iliac inflow disease. There is a patent proximal SFA - popliteal bypass graft. However, due to body habitus and depth of vessels, graft was difficult to image.  There is abnormal monophasic flow demonstrated in the posterior tibial artery, anterior tibial artery and peroneal artery. There is atherosclerotic plaque seen within the common femoral artery, popliteal artery and calf arteries consistent with <50% stenosis. Left Impression The left ankle/brachial index is 0.6 (the DP is 60 and the PT is non-audible mmHg). The common femoral artery demonstrates abnormal monophasic flow indicating significant aortic-iliac inflow disease. The proximal - distal SFA appears occluded with a non functioning stent. There is abnormal monophasic flow demonstrated in the popliteal artery, posterior tibial artery and anterior tibial artery. The peroneal artery appears occluded. There is atherosclerotic plaque seen within the common femoral artery, prox - distal SFA, popliteal artery and calf arteries. Previous exam on 06/26/2020, right ZACH: 0.9 left ZACH: 0.8. Physical Exam:    General Appearance: alert and oriented to person, place and time, well developed and well- nourished, in no acute distress  Skin: warm and dry, no rash or erythema  Head: normocephalic and atraumatic  Eyes: pupils equal, round, and reactive to light, extraocular eye movements intact, conjunctivae normal  ENT: tympanic membrane, external ear and ear canal normal bilaterally, nose without deformity, nasal mucosa and turbinates normal without polyps  Neck: supple and non-tender without mass, no thyromegaly or thyroid nodules, no cervical lymphadenopathy  Pulmonary/Chest: clear to auscultation bilaterally- no wheezes, rales or rhonchi, normal air movement, no respiratory distress  Cardiovascular: normal rate, regular rhythm, normal S1 and S2, no murmurs, rubs, clicks, or gallops, distal pulses intact, no carotid bruits  Abdomen: soft, non-tender, non-distended, normal bowel sounds, no masses or organomegaly    Multiple ulcers bilateral lower legs with moderate serous drainage noted. + fibrotic tissue noted. Ulcers probe to soft tissue only. Bilateral lower legs with moderate edema noted. + erythema noted bilateral LE. Purple discoloration bilateral LE. Venous dermatitis noted bilateral LE. No purulence or malodor noted. Multiple stable eschars on the distal tips of digits noted. No drainage noted. Left 2nd digit in particular with lysing eschar that reveals intact epithelial tissue at the distal tip. Left 2nd digit size is consistent with remaining digits. Absent hair growth bilateral LE.          Assessment:  Patient Active Problem List   Diagnosis Code    Type 2 diabetes mellitus with diabetic polyneuropathy, with long-term current use of insulin ([facility]) E11.42, Z79.4    Essential hypertension I10    CLINT (obstructive sleep apnea) G47.33    Tobacco abuse disorder Z72.0    GERD (gastroesophageal reflux disease) K21.9    Anxiety and depression F41.9, F32.9    Acute hyponatremia E87.1    Iron deficiency anemia D50.9    CAD (coronary artery disease) I25.10    Mitral valve regurgitation I34.0    COPD (chronic obstructive pulmonary disease) ([facility]) J44.9    SHAUNNA (acute kidney injury) ([facility]) N17.9    Vitamin D deficiency E55.9    Dyslipidemia E78.5    Abel's esophagus K22.70    Acute on chronic congestive heart failure ([facility]) I50.9    Viral hepatitis C B19.20    Pulmonary nodule R91.1    Class 2 severe obesity due to excess calories with serious comorbidity and body mass index (BMI) of 39.0 to 39.9 in adult ([facility]) E66.01, Z68.39    Bipolar disorder ([facility]) F31.9    Pulmonary hypertension ([facility]) I27.20    Lower extremity edema R60.0    Habitual self-excoriation F42.4    Ulcer of left lower leg, limited to breakdown of skin ([facility]) L97.921    Ulcer of right leg, limited to breakdown of skin ([facility]) L97.911    Arthritis M19.90    Cardiomyopathy ([facility]) I42.9    Chronic systolic congestive heart failure ([facility]) I50.22    Acute hyperkalemia E87.5    Chronic renal impairment N18.9    Peripheral venous insufficiency I87.2    PAD [facility]peripheral artery disease) [facility] I73.9    Acute renal failure ([facility]) N17.9    Morbid obesity with BMI of 40.0-44.9, adult ([facility]) E66.01, Z68.41    CKD (chronic kidney disease) stage 4, GFR 15-29 ml/min ([facility]) N18.4    Hypokalemia E87.6    Dyspnea on exertion R06.00    Ischemic heart disease I25.9    Moderate protein-calorie malnutrition ([facility]) E44.0    Diabetes mellitus type 2 with complications ([facility]) J88.2    Acute on chronic systolic CHF (congestive heart failure) ([facility]) X33.68    Acute systolic CHF (congestive heart failure) ([facility]) I50.21    CHF (congestive heart failure), NYHA class I, acute on chronic, combined ([facility]) I50.43    Cellulitis L03.90    Closed nondisplaced fracture of navicular bone of left foot S92.255A    Heart failure ([facility]) I50.9    Generalized weakness R53.1    Acute encephalopathy G93.40    Acute kidney injury superimposed on CKD ([facility]) N17.9, N18.9    Idiopathic acute pancreatitis K85.00    Acute pancreatitis without infection or necrosis K85.90    ESRD (end stage renal disease) on dialysis ([facility]) N18.6, Z99.2    Bilateral lower leg cellulitis L03.116, L03.115     venous leg ulcers bilateral LE  Venous insufficiency bilateral LE  Edema bilateral LE  diabetes mellitus uncontrolled  ESRD on dialysis  Cellulitis LE  PAD      Plan  Patient examined. Reviewed labs and imaging. Xrays of the left foot read by radiology to be suspected osteomyelitis tuft left 2nd digit. However xrays from 6/22/21 reviewed as well and reveals similar appearing distal phalanx of the 2nd digit. Thus doubtful that the xray changes are truly osteomyelitis. In addition clinically the left 2nd digit is the most healthy appearing to on the left foot. I would favor this not being osteomyelitis. She needs to follow up with Dr. Roger Whitney as outpatient to continue her PAD treatment. Hopefully preventing any future complications with wounds on her feet and legs. ID has been consulted.   Follow up in the [facility] after D/C - most likely with Dr. Mariama Duke given location of all her wounds.         Electronically signed by Brent uGadalupe DPM on 9/8/2021 at 8:36 AM.

## 2021-09-08 NOTE — PROGRESS NOTES
Inpatient Occupational Therapy  Evaluation and Treatment    Unit: PCU  Date:  9/8/2021  Patient Name:    Rupal Eng  Admitting diagnosis:  Cellulitis [L03.90]  Bilateral lower leg cellulitis [F60.804, W63.203]  Severe sepsis ([address] [A41.9, R65.20]  Admit Date:  9/7/2021  Precautions/Restrictions/WB Status/ Lines/ Wounds/ Oxygen: Fall risk, Bed/chair alarm, Lines -IV and Telemetry    Treatment Time:  8394-1157  Treatment Number: 1   Timed code treatment minutes 32 minutes   Total Treatment minutes:   42   minutes    Patient Goals for Therapy:  \" go back to the [facility] to finish my rehab \"      Discharge Recommendations: SNF  DME needs for discharge: defer to facility       Therapy recommendations for staff:   Assist of 1 with use of rolling walker (RW) and gait belt for all transfers to/from [facility]  to/from [facility]    History of Present Illness: per Dr Le Gallagher ED note 9/7/21:  Nickolas Ivey is a 60 y.o. female  who presents to the ED complaining of bilateral lower extremity edema and worsening redness and pain. Patient states that she has had infection before but never this bad. She states that her legs are weeping and become more swollen and red and very painful. She was given Norco at her nursing facility with only mild improvement in pain. The nurse practitioner at her facility has been providing wound care and wrapping her legs but it has worsened despite that. No vomiting. Patient does have a history of end-stage renal disease on dialysis and has not missed any dialysis sessions. She states that she has peripheral vascular disease and follows with Dr. Deepthi Garcia for her vascular care.     No other complaints, modifying factors or associated symptoms. \"    Home Health S4 Level Recommendation:  NA  AM-PAC Score: AM-PAC Inpatient Daily Activity Raw Score: 16    Preadmission Environment  Admitted from STR stay at [facility], plans to return at d/c.   Juan José Reece Peers friend Edis (able to provide 24/7 assist if needed)  Home environment:    mobile home/trailer  Steps to enter first floor:   7 steps to enter     With bilateral HRs  Steps to second floor: N/A  Bathroom:       Tub/Shower unit and Tub Transfer Bench, standard height toilet, Pt uses BSC in living room  Equipment owned:      SPC, Standard Walker (SW), RW, rollator, BSC (too narrow), recliner chair  Pt sleeps on couch at home     Preadmission Status / PLOF:  History of falls            Yes, fell at [facility] while reaching for something on floor, injured L ear and required stitches  Pt. Able to drive          Yes, prior to last month of illness (hospitalization and STR stay)  Pt Fully independent with Arsh Bur assists with LB dressing  Pt. Required assistance from family for:  Cleaning, Cooking and Laundry   Pt's  friend performs most tasks, patient folds laundry and sometimes does dishes  Pt. Fully independent for transfers and gait and walked with: No Device at home, has been using RW at [employer] to watch TV  Retired from [employer] work     Pain  Yes  Ratin  Location:L lower leg  Pain Medicine Status: RN notified      Cognition    A&O x4   Able to follow 1 step commands    Subjective  Patient sitting up in chair with visitor present. Dick Harris)  Pt agreeable to this OT eval & tx. Upper Extremity ROM:    Impaired R AROM, able to demo full internal rotation but declined flexion at shoulder \"I need a rotator cuff repair\"    Upper Extremity Strength:    Formal strength testing deferred due to painful RUE  LUE appears WFL       Upper Extremity Sensation    Impaired-peripheral numbness in hands and feet    Upper Extremity Proprioception:  WFL    Coordination and Tone  WFL    Balance  Functional Sitting Balance:  Impaired CGA without lateral support  Functional Standing Balance:Impaired MIN A with RW    Bed mobility:    Supine to sit:   Not Tested  Sit to supine:   Not Tested  Rolling:    Not Tested  Scooting in sitting:   Mod A and 2 persons  Scooting to head of bed:   Not Tested    Bridging:   Not Tested    Transfers:    Sit to stand:  Min A   Stand to sit:  Min A   Bed to chair:   Mod A  and with gait belt, RW  Standard toilet: Not Tested  Bed to [facility]:  Not Tested    Dressing:      UE: Mod A change gown  LE:    Max A don socks    Bathing:    UE:  Not Tested  LE:  Not Tested    Eating:   Independent    Toileting:  Not Tested    Activity Tolerance   Pt completed therapy session with SOB noted with exertion  SpO2: 98% on RA  HR: 112 bpm    Positioning Needs:   Pt up in chair, alarm set, positioned in proper neutral alignment and pressure relief provided. Call light provided and all needs within reach    Exercise / Activities Initiated: NA  N/A    Patient/Family Education:   Role of OT  Recommendations for DC  Energy conservation techniques  Safe RW use/hand placement   Encouraged scapular retraction in stance for upright posture    Assessment of Deficits: Pt seen for Occupational therapy evaluation in acute care setting. Pt demonstrated decreased Activity tolerance, ADLs, IADLs, Balance , Bed mobility, Safety Awareness, Strength and Transfers. Pt functioning below baseline and will likely benefit from skilled occupational therapy services to maximize safety and independence. Goal(s) : To be met in 3 Visits:  1). Bed to toilet/BSC: CGA    To be met in 5 Visits:  1). Supine to/from Sit:  CGA  2). Upper Body Bathing:   CGA  3). Lower Body Bathing: Mod A   4). Upper Body Dressing:  CGA  5). Lower Body Dressing: Mod A   6). Pt to demonstrate UE exs x 15 reps with minimal cues    Rehabilitation Potential:  Good for goals listed above. Strengths for achieving goals include: PLOF and Family Support  Barriers to achieving goals include:  Complexity of condition     Plan:   To be seen 3-5 x/wk while in acute care setting for therapeutic exercises, bed mobility, transfers, dressing, bathing, family/patient education, ADL/IADL retraining, energy conservation training.      Dani Blizzard, OTR/L [id number]            If patient discharges from this facility prior to next visit, this note will serve as the Discharge Summary

## 2021-09-08 NOTE — PROGRESS NOTES
Pt is alert and oriented, she ate one box lunch while at [address] [place] [address], pt is leaving with [employer], report called to jeremy jacobo pcu [phone]

## 2021-09-08 NOTE — H&P
Hospital Medicine History & Physical      PCP: Azeb Walton PA-C    Date of Admission: 9/7/2021    Date of Service: Pt seen/examined on 9/8/2021    Chief Complaint:    Chief Complaint   Patient presents with    Leg Pain     Pt states that she was sent in by [facility] for possible cellulitis in bilateral lower legs.  Cellulitis         History Of Present Illness: The patient is a 60 y.o. female with a PMH of ESRD on HD, PAF, CAD s/p CABG/stents, COPD, DM Type II, HLD, HTN, GERD who presented to [facility] ED with complaint of bilateral lower extremity pain and redness. She has chronic peripheral vascular disease status post stent. She follows with Dr. Dora Carter. She is also on dialysis feels that they have been taking off adequate amount of fluid with each session. Reports she does not feel more swollen than normal. No fevers or chills. She she does report weeping from both legs. Reports it is worse on the left than the right. Work-up in the ED showed she was afebrile. She had a lactic acid of 2.7 which trended to normal. She had no leukocytosis. X-ray left foot showed suspected osteomyelitis of the second tuft of necrotic version of the first left. Patient was admitted for antibiotics and further management.     Past Medical History:        Diagnosis Date    Acute blood loss anemia 8/5/2020    Acute kidney injury ([address] 12/25/2019    Acute kidney injury superimposed on CKD ([address] 8/5/2020    Acute on chronic combined systolic and diastolic congestive heart failure ([address] 1/10/2020    Acute on chronic right-sided heart failure ([address] 08/2020    Alcohol dependence ([address] 3/10/2010    Angina pectoris (HCC)     Arthritis     Asthma     Atrial fibrillation (HCC)     CAD (coronary artery disease)     Cardiomyopathy ([address]     Cellulitis 6/3/2020    Charcot's joint of ankle, left     Chronic kidney disease     COPD (chronic obstructive pulmonary disease) ([address]     Diabetic ulcer of left foot associated with type 2 diabetes mellitus, limited to breakdown of skin ([address] 1/18/2020    Diabetic ulcer of toe of right foot associated with type 2 diabetes mellitus ([address] 1/18/2020    Enthesopathy     GERD (gastroesophageal reflux disease)     Hyperlipidemia     Hypertension     Left renal artery stenosis ([address] 08/2020    MI (myocardial infarction) ([address] 10/07/2019    NSTEMI    Morbid obesity ([address]     Osteoarthritis     Peripheral neuropathy     Peripheral vascular disease (HCC)     Polyarthritis     Positive FIT (fecal immunochemical test) 2/28/2020    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 steal syndrome 09/01/2020    Traumatic perinephric hematoma, left, initial encounter 8/4/2020       Past Surgical History:        Procedure Laterality Date    ARTERIAL BYPASS SURGRY Left 01/06/2016    Axillary/Subclavian to Brachial Bypass w/reversed SVG    CARDIAC CATHETERIZATION  03/27/2018    Dr. Fredy Regan - at [address] [place]  01/20/2020    Dr. Cayetano Varner      pancreatitis post surgery    CORONARY ANGIOPLASTY WITH STENT PLACEMENT  03/16/2018    MELODY- 3.5 x 38 and 3.5 x 20 to Cx    CORONARY ANGIOPLASTY WITH STENT PLACEMENT  01/03/2018    MELODY- 3.0 x 38 and 2.75 x 26 and 2.75 x 8 and 2.75 x 22 to the LAD    CORONARY ANGIOPLASTY WITH STENT PLACEMENT  10/07/2019    MELODY- 2.5 x 8 to [address] GRAFT N/A 01/27/2020    CORONARY ARTERY BYPASS GRAFTING X 1, ON PUMP BEATING HEART, INTERNAL MAMMARY ARTERY TO LEFT ANTERIOR DESCENDING ARTERY, VAS CATH INSERTION, URI, PLATELET GEL APPLICATION, 5 LEVEL BILATERAL INTERCOSTAL NERVE BLOCK, STERNAL PLATING performed by Bethanie Joseph MD at [address] [facility] [address] Right 05/16/2019    fem-pop, performed by Dianne Gutiérrez MD at [address]  02/14/2019    CardioMEMs insertion for CHF [id number]  Serial #[id number]  MRI Conditional    IR NONTUNNELED VASCULAR CATHETER  07/09/2021    IR NONTUNNELED VASCULAR CATHETER 7/9/2021 [facility]    IR TUNNELED CATHETER PLACEMENT GREATER THAN 5 YEARS  7/29/2021    IR TUNNELED CATHETER PLACEMENT GREATER THAN 5 YEARS 7/29/2021 [facility]    ROTATOR CUFF REPAIR Left 04/22/2016    TONSILLECTOMY      TRANSESOPHAGEAL ECHOCARDIOGRAM  01/26/2020    TRANSLUMINAL ANGIOPLASTY Left 10/08/2019    with stenting, at [facility]    TRANSLUMINAL ANGIOPLASTY Left 04/29/2020    of the [facility] re-occlusion    TUMOR EXCISION      benign tumors X 2 chest & axilla    UPPER GASTROINTESTINAL ENDOSCOPY  04/25/2013    BX barretts, HH, gastritis    UPPER GASTROINTESTINAL ENDOSCOPY  12/10/2015    UPPER GASTROINTESTINAL ENDOSCOPY  01/06/2017    Gastritis    VASCULAR SURGERY Left 12/08/2015    upper extremity and mesenteric angiogram (diagnostic only)    VASCULAR SURGERY Bilateral 07/07/2020    diagnostic lower extremity angiogram only    VASCULAR SURGERY Left 08/04/2020    angioplasty and stent of renal artery    VASCULAR SURGERY Left 08/05/2020    coil embolization of branch renal artery vessel for bleeding    VASCULAR SURGERY Left 09/01/2020    angioplasty and stenting of subclavian artery       Medications Prior to Admission:    Prior to Admission medications    Medication Sig Start Date End Date Taking?  Authorizing Provider   amiodarone (CORDARONE) 200 MG tablet Take 200 mg by mouth daily    Historical Provider, MD   ARIPiprazole (ABILIFY) 15 MG tablet Take 15 mg by mouth daily    Historical Provider, MD   busPIRone (BUSPAR) 30 MG tablet Take 30 mg by mouth 2 times daily    Historical Provider, MD   carvedilol (COREG) 3.125 MG tablet Take 3.125 mg by mouth 2 times daily (with meals)    Historical Provider, MD   doxepin (SINEQUAN) 25 MG capsule Take 25 mg by mouth nightly    Historical Provider, MD   cephALEXin (KEFLEX) 500 MG capsule Take 1 capsule by mouth 3 times daily  5/5/21   Historical Provider, MD HYDROcodone-acetaminophen (NORCO) 5-325 MG per tablet Take by mouth every 8 hours as needed. 6/3/21   Historical Provider, MD   albuterol sulfate  (90 Base) MCG/ACT inhaler  8/20/21   Historical Provider, MD   silver sulfADIAZINE (SILVADENE) 1 % cream Apply topically daily Apply topically daily.     Historical Provider, MD   Insulin Degludec (TRESIBA FLEXTOUCH) 100 UNIT/ML SOPN Inject 5 Units into the skin nightly  Patient not taking: Reported on 8/31/2021 8/13/21   Yuliana Valle MD   hydrOXYzine (VISTARIL) 25 MG capsule Take 10 mg by mouth 3 times daily as needed for Itching  Patient not taking: Reported on 8/31/2021    Historical Provider, MD   metoprolol tartrate (LOPRESSOR) 25 MG tablet Take 0.5 tablets by mouth 2 times daily  Patient not taking: Reported on 8/31/2021 8/3/21   Jessica Shaikh MD   torsemide (DEMADEX) 100 MG tablet Take 1 tablet by mouth daily  Patient not taking: Reported on 8/31/2021 8/4/21   Jessica Shaikh MD   b complex-C-folic acid (NEPHROCAPS) 1 MG capsule Take 1 capsule by mouth daily  Patient not taking: Reported on 8/31/2021 8/4/21   Jessica Shaikh MD   midodrine (PROAMATINE) 10 MG tablet Take 1 tablet by mouth 3 times daily (with meals)  Patient not taking: Reported on 8/31/2021 8/3/21   Jessica Shaikh MD   pantoprazole (PROTONIX) 40 MG tablet Take 1 tablet by mouth daily 8/3/21   Jessica Shaikh MD   acetaminophen (TYLENOL) 650 MG extended release tablet Take 650 mg by mouth every 4 hours as needed for Pain    Historical Provider, MD   Umeclidinium Bromide (INCRUSE ELLIPTA) 62.5 MCG/INH AEPB Inhale 1 Dose into the lungs daily  Patient not taking: Reported on 8/31/2021    Historical Provider, MD   fluticasone-salmeterol (ADVAIR) 250-50 MCG/DOSE AEPB Inhale 1 puff into the lungs 2 times daily  Patient not taking: Reported on 8/31/2021    Historical Provider, MD   apixaban (ELIQUIS) 5 MG TABS tablet Take 1 tablet by mouth 2 times daily  Patient taking differently: Take pack-year smoking history. She has never used smokeless tobacco.  ETOH:   reports previous alcohol use. Family History:   Positive as follows:        Problem Relation Age of Onset    Heart Disease Maternal Grandmother     Cancer Mother         lung and brain cancer    Cancer Maternal Aunt         lung and brain cancer       REVIEW OF SYSTEMS:     Constitutional: Negative for fever   HENT: Negative for sore throat   Eyes: Negative for redness   Respiratory: Negative  for dyspnea, cough   Cardiovascular: Negative for chest pain   Gastrointestinal: Negative for vomiting, diarrhea   Genitourinary: Negative for hematuria   Musculoskeletal: Negative for arthralgias   Skin: + bilateral LE leg pain, swelling and tenderness   Neurological: Negative for syncope   Hematological: Negative for adenopathy   Psychiatric/Behavorial: Negative for anxiety    PHYSICAL EXAM:    BP 90/61   Pulse 83   Temp 96.5 °F (35.8 °C) (Axillary)   Resp 16   Ht 5' 4\" (1.626 m)   Wt 239 lb 3.2 oz (108.5 kg)   LMP 10/24/2012   SpO2 95%   BMI 41.06 kg/m²   Gen: No distress. Alert. Middle aged  female, resting in bed, appears uncomfortable, morbidly obese  Eyes: No sclera icterus. No conjunctival injection. Neck: Trachea midline. TDC in place on right  Resp: No accessory muscle use. No crackles. No wheezes. No rhonchi. On RA  CV: Regular rate. Regular rhythm. No murmur. No rub. 2+ bilateral pitting edema to LE   Peripheral Pulses: diminished pedal pulses   GI: Soft, obese, Non-tender. Non-distended. Skin: Warm and dry. BLE with multiple ulcerations, no significant warmth, chronic skin changes with erythema noted to BLE, greatest on the left, dry gangrene noted on multiple digits of left foot, peeling skin to plantar aspect of feet  Neuro: Awake. Grossly non-focal, moves all extremities, follows commands, no dysarthia    Psych: Oriented x 4. No anxiety or agitation.              CBC:   Recent Labs     09/07/21  1835   WBC 9. 6   HGB 9.2*   HCT 29.5*   MCV 85.0        BMP:   Recent Labs     09/07/21  1835   *   K 4.2   CL 95*   CO2 18*   BUN 33*   CREATININE 3.6*     LIVER PROFILE:   Recent Labs     09/07/21  1835   AST 12*   ALT 13   BILITOT 1.8*   ALKPHOS 296*     U/A:    Lab Results   Component Value Date    COLORU DARK YELLOW 07/26/2021    WBCUA 3-5 07/26/2021    RBCUA 11-20 07/26/2021    MUCUS Rare 07/26/2021    BACTERIA 1+ 07/26/2021    CLARITYU SL CLOUDY 07/26/2021    SPECGRAV 1.025 07/26/2021    LEUKOCYTESUR SMALL 07/26/2021    BLOODU TRACE-INTACT 07/26/2021    GLUCOSEU Negative 07/26/2021    AMORPHOUS Rare 08/04/2020       ABG    Lab Results   Component Value Date    WPQ1ZAS 18.9 07/29/2021    BEART -5.4 07/29/2021    T2FMQOKO 97.9 07/29/2021    PHART 7.389 07/29/2021    JWL8SRR 32.0 07/29/2021    PO2ART 106.4 07/29/2021    KHW7YPK 19.9 07/29/2021     Results for LONG, Kenyon Callander (MRN [id number]) as of 9/8/2021 07:36   Ref. Range 9/7/2021 18:35 9/8/2021 06:19   Lactic Acid, Sepsis Latest Ref Range: 0.4 - 1.9 mmol/L 2.7 (H) 1.6       CULTURES    SARS-COV-2 - Rapid: Not detected     Blood cx x2: pending     EKG:  None    RADIOLOGY    XR FOOT LEFT (MIN 3 VIEWS)   Final Result   Suspected osteomyelitis of the 2nd tuft. Chronic erosion of the 1st tuft. Cellulitis of the forefoot. Pertinent previous results reviewed     TTE 4/26/2021  Summary   Definity contrast administered. Left ventricular systolic function is reduced with ejection fraction   estimated at 25 %. There is systolic and diastolic septal flattening consistent with right   ventricular pressure and volume overload. There is akinesis of the entire apex. There is hypokinesis of the anterior and anteroseptal wall. Left ventricular size is mildly increased. Normal left ventricular wall thickness. Grade III diastolic dysfunction with elevated filing pressure. Moderate mitral regurgitation.    No vegetation or masses are seen on the mitral valve. Right ventricular systolic function is mildly to moderately reduced . The right ventricle is mildly enlarged. Moderate tricuspid regurgitation. Systolic pulmonary artery pressure (SPAP) estimated at 52 mmHg (RA pressure   8 mmHg), consistent with moderate pulmonary hypertension. No vegetation or masses are seen on the tricuspid valve. The right atrium is mildly dilated. If suspicion for endocarditis is high recommend URI. Shana Corrales MD have reviewed the chart on Nikole Potts and personally interviewed and examined patient, reviewed the data (labs and imaging) and after discussion with my PA formulated the plan. Agree with note with the following edits. HPI:     68-year-old white female with multiple medical problems. She started on hemodialysis a month ago. She has ESRD, paroxysmal atrial fibrillation, CAD status post CABG status post stents, COPD, diabetes mellitus type 2 with ESRD, HLD, hypertension, GERD who was sent to the emergency room with complaints of pain in her legs and redness. She has chronic edema of her legs and nephrology has been trying to maintain a negative fluid balance at hemodialysis. Patient states her legs have been weeping. There is no fever or chills. Work-up in the ED showed no leukocytosis. I reviewed the patient's Past Medical History, Past Surgical History, Medications, and Allergies. Physical exam:    BP 90/61   Pulse 83   Temp 96.5 °F (35.8 °C) (Axillary)   Resp 16   Ht 5' 4\" (1.626 m)   Wt 239 lb 3.2 oz (108.5 kg)   LMP 10/24/2012   SpO2 95%   BMI 41.06 kg/m²     Gen: No distress. Alert. Chronically ill-appearing  Eyes: PERRL. No sclera icterus. No conjunctival injection. ENT: No discharge. Pharynx clear. Neck: Trachea midline. Normal thyroid. Resp: No accessory muscle use. No crackles. No wheezes. No rhonchi. No dullness on percussion. CV: Regular rate. Regular rhythm. No murmur or rub. + edema. GI: Non-tender. Non-distended. No masses. No organomegaly. Normal bowel sounds. No hernia. Skin: Both legs show multiple open areas with ulcers. Left toes show mild dry gangrene. Peripheral pulses are diminished. Some purulent drainage is seen. Lymph: No cervical LAD. No supraclavicular LAD. M/S: No cyanosis. No joint deformity. No clubbing. Neuro: Moves all 4 extremities  Psych: Oriented x 3. No anxiety or agitation. ASSESSMENT/PLAN:    Cellulitis to BLE  Chronic Wounds to BLE  Chronic LE Swelling  - check PCT, blood cx x2  - on Vanc and Cefepime D#1, PRN Norco for pain  - wound care consult, ID consult    Suspected Osteomyelitis  - XR left foot showed suspected osteomyelitis of the 2nd tuft, chronic erosion of the 1st tuft  - start Vanc and Cefepime D#1  - Podiatry consult    AGMA  ESRD  - recently started on HD  - has right TDC  - HD on T,Th, S  - Nephrology consult    Hypotension  - on Midodrine    Lactic Acidosis  - trended to normal  - no sepsis    Chronic Combined Diastolic and Systolic CHF  Moderate Pulmonary HTN  Ischemic Cardiomyopathy  - appears compensated  - last echo 4/2021: EF 25%, grade III DD  - has not yet f/u with EP for defibrillator  - cont Coreg, statin    CAD s/p CABG/Stents  - no chest pain  - cont Plavix, Lipitor, Coreg    PVD  - f/w Dr. Eglin Quiles  - cont Plavix, Statin    Hx of PAF  - in NSR  - AC on Eliquis  - cont Amiodarone, Coreg    Type II DM  - uncontrolled  - last Hgb A1c: 7.9 on 7/29  - add lantus 5 units nightly, medium dose SSI  - POC Glucose, carb control diet     COPD  - no AE  - cont Symbicort    GERD  - cont Protonix    Normocytic Anemia  - Hgb 9.2  - stable    HLD  - cont Lipitor    Anxiety  - cont Buspar    CLINT  - not compliant with CPAP    DVT Prophylaxis: Eliquis  Diet: ADULT DIET;  Regular; 3 carb choices (45 gm/meal)  Code Status: Full Code    Abundio Root, [id number] PA-C 8:49 AM 9/8/2021         Cyndi Habermann, MD 9/8/21 7.15 am

## 2021-09-08 NOTE — CONSULTS
[address] [address] Continence Nurse  Consult Note       NAME:  Bart Bautista  MEDICAL RECORD NUMBER:  [id number]  AGE: 60 y.o. GENDER: female  : 1965  TODAY'S DATE:  2021    Subjective Pt is cooperative, family at bedside. Reason for WOCN Evaluation and Assessment: BLE, left great toe      Calton Litten is a 60 y.o. female referred by:   [] Physician  [] Nursing  [] Other:     Wound Identification:  Wound Type: venous, arterial and diabetic  Contributing Factors: edema, venous stasis, diabetes, poor glucose control, obesity and arterial insufficiency    Pt seen for wound care to the BLE and left great toe. Pt states she has followed Dr Ezequiel Arcos in the past in his office for recurring cellulitis. She has seen Dr Nolan Grewal in the past for arterial insufficiency. Pt seen this AM by Sr Loving. hospitalist requests wounds be left open till seen by Dr Ezequiel Arcos.       Patient Goal of Care:  [x] Wound Healing  [] Odor Control  [] Palliative Care  [] Pain Control   [] Other:         PAST MEDICAL HISTORY        Diagnosis Date    Acute blood loss anemia 2020    Acute on chronic combined systolic and diastolic congestive heart failure ([address] 01/10/2020    Acute on chronic right-sided heart failure ([address] 2020    Alcohol dependence ([address] 03/10/2010    Angina pectoris (HCC)     Arthritis     Asthma     Atrial fibrillation (HCC)     CAD (coronary artery disease)     Cardiomyopathy ([address]     Cellulitis 2020    Charcot's joint of ankle, left     COPD (chronic obstructive pulmonary disease) ([address]     Diabetic ulcer of left foot associated with type 2 diabetes mellitus, limited to breakdown of skin ([address] 2020    Diabetic ulcer of toe of right foot associated with type 2 diabetes mellitus ([address] 2020    Enthesopathy     ESRD (end stage renal disease) on dialysis ([address]     Tues-Thurs-Sat    GERD (gastroesophageal reflux disease)     Hyperlipidemia     Hypertension     Left renal artery stenosis ([facility] 08/2020    MI (myocardial infarction) ([facility] 10/07/2019    NSTEMI    Morbid obesity (HCC)     Osteoarthritis     Peripheral neuropathy     Peripheral vascular disease (HCC)     Polyarthritis     Positive FIT (fecal immunochemical test) 02/28/2020    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 steal syndrome 09/01/2020    Traumatic perinephric hematoma, left, initial encounter 08/04/2020       PAST SURGICAL HISTORY    Past Surgical History:   Procedure Laterality Date    ARTERIAL BYPASS SURGRY Left 01/06/2016    Axillary/Subclavian to Brachial Bypass w/reversed SVG    CARDIAC CATHETERIZATION  03/27/2018    Dr. Fany Sunshine - at [address] [place]  01/20/2020    Dr. Hart Half      pancreatitis post surgery    CORONARY ANGIOPLASTY WITH STENT PLACEMENT  03/16/2018    MELODY- 3.5 x 38 and 3.5 x 20 to Cx    CORONARY ANGIOPLASTY WITH STENT PLACEMENT  01/03/2018    MELODY- 3.0 x 38 and 2.75 x 26 and 2.75 x 8 and 2.75 x 22 to the LAD    CORONARY ANGIOPLASTY WITH STENT PLACEMENT  10/07/2019    MELODY- 2.5 x 8 to mLAD    CORONARY ARTERY BYPASS GRAFT N/A 01/27/2020    CORONARY ARTERY BYPASS GRAFTING X 1, ON PUMP BEATING HEART, INTERNAL MAMMARY ARTERY TO LEFT ANTERIOR DESCENDING ARTERY, VAS CATH INSERTION, URI, PLATELET GEL APPLICATION, 5 LEVEL BILATERAL INTERCOSTAL NERVE BLOCK, STERNAL PLATING performed by Tyson Palacios MD at [address] Right 05/16/2019    fem-pop, performed by Daniel Gaspar MD at [address]  02/14/2019    CardioMEMs insertion for CHF CQ4615  Serial #[id number]  MRI Conditional    IR NONTUNNELED VASCULAR CATHETER  07/09/2021    IR NONTUNNELED VASCULAR CATHETER 7/9/2021 [facility]    IR TUNNELED CATHETER PLACEMENT GREATER THAN 5 YEARS  7/29/2021    IR TUNNELED CATHETER PLACEMENT GREATER THAN 5 YEARS 7/29/2021 [facility]    ROTATOR CUFF REPAIR Left 04/22/2016    TONSILLECTOMY      TRANSESOPHAGEAL ECHOCARDIOGRAM  01/26/2020    TRANSLUMINAL ANGIOPLASTY Left 10/08/2019    with stenting, at [facility]    TRANSLUMINAL ANGIOPLASTY Left 04/29/2020    of the [facility] re-occlusion    TUMOR EXCISION      benign tumors X 2 chest & axilla    UPPER GASTROINTESTINAL ENDOSCOPY  04/25/2013    BX barretts, HH, gastritis    UPPER GASTROINTESTINAL ENDOSCOPY  12/10/2015    UPPER GASTROINTESTINAL ENDOSCOPY  01/06/2017    Gastritis    VASCULAR SURGERY Left 12/08/2015    upper extremity and mesenteric angiogram (diagnostic only)    VASCULAR SURGERY Bilateral 07/07/2020    diagnostic lower extremity angiogram only    VASCULAR SURGERY Left 08/04/2020    angioplasty and stent of renal artery    VASCULAR SURGERY Left 08/05/2020    coil embolization of branch renal artery vessel for bleeding    VASCULAR SURGERY Left 09/01/2020    angioplasty and stenting of subclavian artery       FAMILY HISTORY    Family History   Problem Relation Age of Onset    Heart Disease Maternal Grandmother     Cancer Mother         lung and brain cancer    Cancer Maternal Aunt         lung and brain cancer       SOCIAL HISTORY    Social History     Tobacco Use    Smoking status: Current Every Day Smoker     Packs/day: 1.00     Years: 30.00     Pack years: 30.00     Types: Cigarettes    Smokeless tobacco: Never Used   Vaping Use    Vaping Use: Never used   Substance Use Topics    Alcohol use: Not Currently     Comment: quit 2006     Drug use: Never       ALLERGIES    Allergies   Allergen Reactions    Lisinopril Other (See Comments)     Severe hypotension       MEDICATIONS    No current facility-administered medications on file prior to encounter.      Current Outpatient Medications on File Prior to Encounter   Medication Sig Dispense Refill    amiodarone (CORDARONE) 200 MG tablet Take 200 mg by mouth daily      ARIPiprazole (ABILIFY) 15 MG tablet Take 15 mg by mouth daily      busPIRone (BUSPAR) 30 MG tablet Take 30 mg by mouth 2 times daily      carvedilol (COREG) 3.125 MG tablet Take 3.125 mg by mouth 2 times daily (with meals)      doxepin (SINEQUAN) 25 MG capsule Take 25 mg by mouth nightly      cephALEXin (KEFLEX) 500 MG capsule Take 1 capsule by mouth 3 times daily       HYDROcodone-acetaminophen (NORCO) 5-325 MG per tablet Take by mouth every 8 hours as needed.  albuterol sulfate  (90 Base) MCG/ACT inhaler       silver sulfADIAZINE (SILVADENE) 1 % cream Apply topically daily Apply topically daily.       Insulin Degludec (TRESIBA FLEXTOUCH) 100 UNIT/ML SOPN Inject 5 Units into the skin nightly (Patient not taking: Reported on 8/31/2021) 10 pen 5    hydrOXYzine (VISTARIL) 25 MG capsule Take 10 mg by mouth 3 times daily as needed for Itching (Patient not taking: Reported on 8/31/2021)      metoprolol tartrate (LOPRESSOR) 25 MG tablet Take 0.5 tablets by mouth 2 times daily (Patient not taking: Reported on 8/31/2021)      torsemide (DEMADEX) 100 MG tablet Take 1 tablet by mouth daily (Patient not taking: Reported on 8/31/2021)      b complex-C-folic acid (NEPHROCAPS) 1 MG capsule Take 1 capsule by mouth daily (Patient not taking: Reported on 8/31/2021)      midodrine (PROAMATINE) 10 MG tablet Take 1 tablet by mouth 3 times daily (with meals) (Patient not taking: Reported on 8/31/2021)      pantoprazole (PROTONIX) 40 MG tablet Take 1 tablet by mouth daily      acetaminophen (TYLENOL) 650 MG extended release tablet Take 650 mg by mouth every 4 hours as needed for Pain      Umeclidinium Bromide (INCRUSE ELLIPTA) 62.5 MCG/INH AEPB Inhale 1 Dose into the lungs daily (Patient not taking: Reported on 8/31/2021)      fluticasone-salmeterol (ADVAIR) 250-50 MCG/DOSE AEPB Inhale 1 puff into the lungs 2 times daily (Patient not taking: Reported on 8/31/2021)      apixaban (ELIQUIS) 5 MG TABS tablet Take 1 tablet by mouth 2 times daily (Patient taking differently: Take 2.5 mg by mouth 2 times daily ) 60 tablet     miconazole (MICOTIN) 2 % powder Apply topically 2 times daily. (Patient not taking: Reported on 8/31/2021) 45 g 1    Blood Glucose Monitoring Suppl (ONE TOUCH ULTRA 2) w/Device KIT USE TO MONITOR BLOOD GLUCOSE LEVELS 1 kit 0    ONE TOUCH ULTRASOFT LANCETS MISC USE TO TEST BLOOD GLUCOSE LEVELS 4 TIMES DAILY (Patient not taking: Reported on 8/31/2021) 150 each 3    blood glucose test strips (ASCENSIA AUTODISC VI;ONE TOUCH ULTRA TEST VI) strip USE TO MONITOR BLOOD GLUCOSE LEVELS 4 TIMES DAILY 150 each 3    nitroGLYCERIN (NITROSTAT) 0.4 MG SL tablet Place 1 tablet under the tongue every 5 minutes as needed for Chest pain up to max of 3 total doses.  If no relief after 1 dose, call 911. 25 tablet 0    Insulin Pen Needle (B-D ULTRAFINE III SHORT PEN) 31G X 8 MM MISC Five shots a day 200 each 5    atorvastatin (LIPITOR) 80 MG tablet TAKE 1 TABLET BY MOUTH EVERY DAY AT NIGHT (Patient not taking: Reported on 8/31/2021) 90 tablet 3    blood glucose test strips (FREESTYLE LITE) strip USE TO CHECK BLOOD GLUCOSE LEVELS FOUR TIMES DAILY 200 strip 10    benztropine (COGENTIN) 1 MG tablet Take 1 mg by mouth nightly  (Patient not taking: Reported on 8/31/2021)      INSULIN SYRINGE .5CC/29G 29G X 1/2\" 0.5 ML MISC 1 each by Does not apply route daily 100 each 3    clopidogrel (PLAVIX) 75 MG tablet TAKE 1 TABLET BY MOUTH EVERY DAY 30 tablet 5    lamoTRIgine (LAMICTAL) 200 MG tablet Take 200 mg by mouth 2 times daily          Objective    BP 93/65   Pulse 81   Temp 97.5 °F (36.4 °C) (Oral)   Resp 16   Ht 5' 4\" (1.626 m)   Wt 239 lb 3.2 oz (108.5 kg)   LMP 10/24/2012   SpO2 99%   BMI 41.06 kg/m²     LABS:  WBC:    Lab Results   Component Value Date    WBC 9.6 09/07/2021     H/H:    Lab Results   Component Value Date    HGB 9.2 09/07/2021    HCT 29.5 09/07/2021     PTT:    Lab Results   Component Value Date    APTT 35.2 07/29/2021   [APTT}  PT/INR:    Lab Results   Component Value Date    PROTIME 18.2 07/29/2021    INR 1.58 07/29/2021     HgBA1c:    Lab Results   Component Value Date    LABA1C 7.9 07/29/2021       Assessment   Vivek Risk Score: Vivek Scale Score: 18    Patient Active Problem List   Diagnosis Code    Type 2 diabetes mellitus with diabetic polyneuropathy, with long-term current use of insulin ([facility]) E11.42, Z79.4    Essential hypertension I10    CLINT (obstructive sleep apnea) G47.33    Tobacco abuse disorder Z72.0    GERD (gastroesophageal reflux disease) K21.9    Anxiety and depression F41.9, F32.9    Acute hyponatremia E87.1    Iron deficiency anemia D50.9    CAD (coronary artery disease) I25.10    Mitral valve regurgitation I34.0    COPD (chronic obstructive pulmonary disease) ([facility]) J44.9    SHAUNNA (acute kidney injury) ([facility]) N17.9    Vitamin D deficiency E55.9    Dyslipidemia E78.5    Abel's esophagus K22.70    Acute on chronic congestive heart failure (HCC) I50.9    Viral hepatitis C B19.20    Pulmonary nodule R91.1    Class 2 severe obesity due to excess calories with serious comorbidity and body mass index (BMI) of 39.0 to 39.9 in adult ([facility]) E66.01, Z68.39    Bipolar disorder ([facility]) F31.9    Pulmonary hypertension (HCC) I27.20    Lower extremity edema R60.0    Habitual self-excoriation F42.4    Ulcer of left lower leg, limited to breakdown of skin (HCC) L97.921    Ulcer of right leg, limited to breakdown of skin ([facility] L97.911    Arthritis M19.90    Cardiomyopathy (HCC) I42.9    Chronic systolic congestive heart failure (HCC) I50.22    Acute hyperkalemia E87.5    Chronic renal impairment N18.9    Peripheral venous insufficiency I87.2    PAD (peripheral artery disease) (HCC) I73.9    Acute renal failure (HCC) N17.9    Morbid obesity with BMI of 40.0-44.9, adult (HCC) E66.01, Z68.41    CKD (chronic kidney disease) stage 4, GFR 15-29 ml/min (HCC) N18.4    Hypokalemia E87.6    Dyspnea on exertion R06.00    Ischemic heart disease I25.9    Moderate protein-calorie malnutrition ([facility]) E44.0    Diabetes mellitus type 2 with complications ([facility]) G79.6    Acute on chronic systolic CHF (congestive heart failure) ([facility]) V65.21    Acute systolic CHF (congestive heart failure) ([facility]) I50.21    CHF (congestive heart failure), NYHA class I, acute on chronic, combined ([facility]) I50.43    Cellulitis L03.90    Closed nondisplaced fracture of navicular bone of left foot S92.255A    Heart failure ([facility]) I50.9    Generalized weakness R53.1    Acute encephalopathy G93.40    Acute kidney injury superimposed on CKD ([facility]) N17.9, N18.9    Idiopathic acute pancreatitis K85.00    Acute pancreatitis without infection or necrosis K85.90    ESRD (end stage renal disease) on dialysis ([facility]) N18.6, Z99.2    Bilateral lower leg cellulitis L03.116, L03.115       Measurements:  Wound 01/11/21 Chest (Active)   Number of days: 239       Wound 04/22/21 Leg Left; Lower; Anterior; Lateral wounds in clusters. (Active)   Number of days: 139       Wound 04/22/21 Toe (Comment  which one) Left cluster tip of 1 + 2, dorsal on toe 3 brown and yellow slough. (Active)   Wound Image   08/08/21 0053   Wound Etiology Diabetic 09/08/21 0832   Dressing Status Dry; Intact 09/08/21 0832   Wound Cleansed Cleansed with saline 08/09/21 1217   Dressing/Treatment Open to air;Betadine swabs/povidone iodine 08/09/21 1217   Dressing Change Due 08/10/21 08/09/21 1217   Wound Assessment Eschar dry 09/08/21 0832   Drainage Amount None 09/08/21 0832   Odor None 09/08/21 0832   Shaye-wound Assessment Dry/flaky 09/08/21 0832   Margins Attached edges; Defined edges 08/09/21 1217   Number of days: 139       Wound 04/22/21 Leg Right (Active)   Number of days: 139       Wound 07/06/21 Radial Distal;Left skin tear, steri strips applied (Active)   Number of days: 64       Wound 07/29/21 Left;Right; Outer; Lower cluster of venous statis wounds healing (dry and intact) (Active)   Wound Image    08/09/21 1217   Wound Etiology Diet: ADULT DIET; Regular; 3 carb choices (45 gm/meal);  Low Sodium (2 gm)  Dietician consult:  Yes    Discharge Plan:  Placement for patient upon discharge: skilled nursing    Patient appropriate for Outpatient [address]: Yes    Referrals:  [x]   [] [facility]  [] Supplies  [] Other    Patient/Caregiver Teaching:  Level of patient/caregiver understanding able to:   [] Indicates understanding       [] Needs reinforcement  [] Unsuccessful      [x] Verbal Understanding  [] Demonstrated understanding       [] No evidence of learning  [] Refused teaching         [] N/A       Electronically signed by Jessa Pino RN, CWOCN on 9/8/2021 at 5:05 PM

## 2021-09-08 NOTE — CONSULTS
Pharmacy Note  Vancomycin Consult    Josey Sandoval is a 60 y.o. female, diabetic, fairly new to dialysis, started on Vancomycin for gram positive coverage of bilateral lower leg cellulitis; consult received from Dr. Gurvinder Ellis to manage therapy. Also receiving the following antibiotics: cefepime.     Patient Active Problem List   Diagnosis    Type 2 diabetes mellitus with diabetic polyneuropathy, with long-term current use of insulin (HCC)    Essential hypertension    CLINT (obstructive sleep apnea)    Tobacco abuse disorder    GERD (gastroesophageal reflux disease)    Anxiety and depression    Acute hyponatremia    Iron deficiency anemia    CAD (coronary artery disease)    Mitral valve regurgitation    COPD (chronic obstructive pulmonary disease) (HCC)    SHAUNNA (acute kidney injury) ([facility]    Vitamin D deficiency    Dyslipidemia    Abel's esophagus    Acute on chronic congestive heart failure (HCC)    Viral hepatitis C    Pulmonary nodule    Class 2 severe obesity due to excess calories with serious comorbidity and body mass index (BMI) of 39.0 to 39.9 in adult ([address]    Bipolar disorder ([facility]    Pulmonary hypertension (HCC)    Lower extremity edema    Habitual self-excoriation    Ulcer of left lower leg, limited to breakdown of skin (HCC)    Ulcer of right leg, limited to breakdown of skin ([address]    Arthritis    Cardiomyopathy ([address]    Chronic systolic congestive heart failure (HCC)    Acute hyperkalemia    Chronic renal impairment    Peripheral venous insufficiency    PAD (peripheral artery disease) (HCC)    Acute renal failure (HCC)    Morbid obesity with BMI of 40.0-44.9, adult (HCC)    CKD (chronic kidney disease) stage 4, GFR 15-29 ml/min (HCC)    Hypokalemia    Dyspnea on exertion    Ischemic heart disease    Moderate protein-calorie malnutrition (HCC)    Diabetes mellitus type 2 with complications (HCC)    Acute on chronic systolic CHF (congestive heart failure) (HCC)    Acute systolic CHF (congestive heart failure) ([facility])    CHF (congestive heart failure), NYHA class I, acute on chronic, combined ([facility])    Cellulitis    Closed nondisplaced fracture of navicular bone of left foot    Heart failure ([facility])    Generalized weakness    Acute encephalopathy    Acute kidney injury superimposed on CKD ([facility])    Idiopathic acute pancreatitis    Acute pancreatitis without infection or necrosis    ESRD (end stage renal disease) on dialysis ([facility])     Allergies:  Lisinopril     Temp max: 96.5    Recent Labs     09/07/21  1835   BUN 33*   CREATININE 3.6*   WBC 9.6     No intake or output data in the 24 hours ending 09/08/21 0753  Culture Date      Source                       Results      Ht Readings from Last 1 Encounters:   09/07/21 5' 4\" (1.626 m)        Wt Readings from Last 1 Encounters:   09/07/21 239 lb 3.2 oz (108.5 kg)       Body mass index is 41.06 kg/m². Estimated Creatinine Clearance: 21 mL/min (A) (based on SCr of 3.6 mg/dL (H)). Goal Trough Level: 13 - 18 mcg/mL    Assessment/Plan:  Will initiate Vancomycin with a one time loading dose of 1500 mg x1 (given 9/7 @ 1950, followed by pulse-dosing per the following protocol for dialysis patients. Draw vanco level prior to each dialysis session. For level   -     > 20mcg/ml   -   no vanco                   -   10-20mcg/ml   -   750 mg vanco IVPB                   -     <10mcg/ml    -   1500 mg vanco IVPB  Give vanco at the end of dialysis session. Patient had her normal dialysis session Tues morning, but I obtained a morning vancomycin level just in case she were to go to dialysis today. Patient's vancomycin level this AM was 21.5 mcg/ml. Patient will not receive any vancomycin today regardless if she goes to dialysis or not. Thank you for the consult. Will continue to follow.

## 2021-09-08 NOTE — FLOWSHEET NOTE
09/08/21 1245   Pain Assessment   Pain Assessment 0-10   Pain Level 8   Patient's Stated Pain Goal No pain   Pain Type Chronic pain   Pain Location Leg  (both)   Pain Descriptors Pins and needles   Pain Frequency Continuous   Pain Onset On-going     Patient requested pain medication, noted above is pain scale of 8 c/o leg pain in both extremities feeling of pins and needles. PRN Norco give per MAR. Patient is stable with call light in reach.     Dave Callejas RN

## 2021-09-08 NOTE — PROGRESS NOTES
placed new BLE dressings and stated for patient to keep them on. Patient was educated to keep legs elevated. Verbal feedback was given on the understanding. Patient is in bed resting. Call light in reach.   Brock Razo RN

## 2021-09-08 NOTE — FLOWSHEET NOTE
09/08/21 1901   Pain Assessment   Pain Assessment 0-10   Pain Level 8   Patient's Stated Pain Goal No pain   Pain Type Chronic pain   Pain Location Leg   Pain Orientation Left   Pain Descriptors Stabbing; Throbbing   Pain Frequency Continuous   Pain Onset On-going   Clinical Progression Not changed   Functional Pain Assessment Prevents or interferes some active activities and ADLs     Patient given PRN [place] per MAR.   Brock Razo RN

## 2021-09-08 NOTE — CARE COORDINATION
Case Management Assessment  Initial Evaluation      Patient Name: Gabriele Peters  YOB: 1965  Diagnosis: Cellulitis [L03.90]  Bilateral lower leg cellulitis [L03.116, V14.689]  Severe sepsis ([address] [A41.9, R65.20]  Date / Time: 9/7/2021  5:13 PM    Admission status/Date: INPT 9/7/21  Chart Reviewed: Yes      Patient Interviewed: Yes   Family Interviewed:  No      Hospitalization in the last 30 days:  No      Health Care Decision Maker :   Primary Decision Maker: Jeremy Lucas \"Lanny\" - Child - [phone]    Secondary Decision Maker: Richard Kolton - Brother/Sister - [phone]    (CM - must 1st enter selection under Navigator - emergency contact- [place] Relationship and pick relationship)   Who do you trust or have selected to make healthcare decisions for you      Met with: pt via telephone  Interview conducted  (bedside/phone):    Current PCP: [address] required for SNF : Y, N          3 night stay required - Y, N    ADLS  Support Systems/Care Needs:    Transportation: EMS transportation    Meal Preparation: per facility    Housing  Living Arrangements: [facility]  Steps: n/a  Intent for return to present living arrangements: Yes  Identified Issues: STR    Home Care Information  Active with [address] : No Agency:(Services)     Passport/Waiver : No  :                      Phone Number:    Passport/Waiver Services: -          Durable Medical Equiptment   DME Provider: per facility  Equipment: -  Walker___Cane___RTS___ BSC___Shower Chair___Hospital Bed___W/C____Other________  02 at ____Liter(s)---wears(frequency)_______ [facility] ___ CPAP___ BiPap___   N/A____      Home O2 Use :  Per facility    If No for home O2---if presently on O2 during hospitalization:  No  if yes CM to follow for potential DC O2 need  Informed of need for care provider to bring portable home O2 tank on day of discharge for nursing to connect prior to leaving:   Not Indicated  Verbalized agreement/Understanding:   Not Indicated    Community Service Affiliation  Dialysis:  Yes     · Agency: [facility]  · Location: [place], [place]  · Dialysis Schedule: TTS 0600  · Phone:   · Fax: Other Community Services: (ex:PT/OT,Mental Health,Wound Clinic, Cardio/Pul [address])    DISCHARGE PLAN: Explained Case Management role/services. Reviewed chart and spoke with pt who states that she is from [facility] for STR and is active with [facility] in [facility] for HD. Pt states that she plans to return to [facility] at discharge. CM called and left [facility] for [facility] with [facility]. Await return call.

## 2021-09-08 NOTE — PROGRESS NOTES
Critical lab value called for vanc of 21.5. [address] notified. Care transferred at this time.

## 2021-09-08 NOTE — DISCHARGE INSTR - COC
Continuity of Care Form    Patient Name: Kasia Bradford   :  1965  MRN:  [id number]    Admit date:  2021  Discharge date:  9/10/2021    Code Status Order: Full Code   Advance Directives:   Advance Care Flowsheet Documentation       Date/Time Healthcare Directive Type of Healthcare Directive Copy in [address] Agent's Name Healthcare Agent's Phone Number    [phone]  No, patient does not have an advance directive for healthcare treatment  --  --  --  --  --            Admitting Physician:  Neftali Simpson MD  PCP: Leavy Kussmaul, PA-C    Discharging Nurse: Annamaria Arenas [facility]/Room#: /3481-17  Discharging Unit Phone Number: [phone]    Emergency Contact:   Extended Emergency Contact Information  Primary Emergency Contact: Lexa Briggs \"Lanny\"  Home Phone: [phone]  Mobile Phone: [phone]  Relation: Child  Preferred language: English   needed?  No  Secondary Emergency Contact: [address] [place] [address] Phone: [phone]  Relation: Other    Past Surgical History:  Past Surgical History:   Procedure Laterality Date    ARTERIAL BYPASS SURGRY Left 2016    Axillary/Subclavian to Brachial Bypass w/reversed SVG    CARDIAC CATHETERIZATION  2018    Dr. Colon Guardian - at [facility]  2020    Dr. Kylie Moses      pancreatitis post surgery    CORONARY ANGIOPLASTY WITH STENT PLACEMENT  2018    MELODY- 3.5 x 38 and 3.5 x 20 to Cx    CORONARY ANGIOPLASTY WITH STENT PLACEMENT  2018    MELODY- 3.0 x 38 and 2.75 x 26 and 2.75 x 8 and 2.75 x 22 to the LAD    CORONARY ANGIOPLASTY WITH STENT PLACEMENT  10/07/2019    MELODY- 2.5 x 8 to mLAD    CORONARY ARTERY BYPASS GRAFT N/A 2020    CORONARY ARTERY BYPASS GRAFTING X 1, ON PUMP BEATING HEART, INTERNAL MAMMARY ARTERY TO LEFT ANTERIOR DESCENDING ARTERY, VAS CATH INSERTION, URI, PLATELET GEL APPLICATION, 5 LEVEL BILATERAL INTERCOSTAL NERVE BLOCK, STERNAL PLATING performed by Renan Brown MD at [address] Right 05/16/2019    fem-pop, performed by Noah Vasquez MD at [address]  02/14/2019    CardioMEMs insertion for CHF TM6022  Serial #[id number]  MRI Conditional    IR NONTUNNELED VASCULAR CATHETER  07/09/2021    IR NONTUNNELED VASCULAR CATHETER 7/9/2021 [facility] PROCEDURES    IR TUNNELED CATHETER PLACEMENT GREATER THAN 5 YEARS  7/29/2021    IR TUNNELED CATHETER PLACEMENT GREATER THAN 5 YEARS 7/29/2021 [address] SPECIAL PROCEDURES    ROTATOR CUFF REPAIR Left 04/22/2016    TONSILLECTOMY      TRANSESOPHAGEAL ECHOCARDIOGRAM  01/26/2020    TRANSLUMINAL ANGIOPLASTY Left 10/08/2019    with stenting, at [facility]    [address] Left 04/29/2020    of the [facility] re-occlusion    TUMOR EXCISION      benign tumors X 2 chest & axilla    UPPER GASTROINTESTINAL ENDOSCOPY  04/25/2013    BX barretts, HH, gastritis    UPPER GASTROINTESTINAL ENDOSCOPY  12/10/2015    UPPER GASTROINTESTINAL ENDOSCOPY  01/06/2017    Gastritis    VASCULAR SURGERY Left 12/08/2015    upper extremity and mesenteric angiogram (diagnostic only)    VASCULAR SURGERY Bilateral 07/07/2020    diagnostic lower extremity angiogram only    VASCULAR SURGERY Left 08/04/2020    angioplasty and stent of renal artery    VASCULAR SURGERY Left 08/05/2020    coil embolization of branch renal artery vessel for bleeding    VASCULAR SURGERY Left 09/01/2020    angioplasty and stenting of subclavian artery       Immunization History:   Immunization History   Administered Date(s) Administered    COVID-19, Moderna, PF, 100mcg/0.5mL 03/13/2021, 04/10/2021    Influenza Vaccine, unspecified formulation 11/04/2016    Influenza Virus Vaccine 12/11/2015, 11/21/2017    Influenza, Quadv, IM, (6 mo and older Fluzone, Flulaval, Fluarix and 3 yrs and older Afluria) 12/19/2013, 10/14/2016, 11/09/2017    Influenza, Quadv, IM, PF (6 mo and older Fluzone, Flulaval, Fluarix, and 3 yrs and older Afluria) 10/01/2019    Pneumococcal Polysaccharide ([id number]) 12/11/2015       Active Problems:  Patient Active Problem List   Diagnosis Code    Type 2 diabetes mellitus with diabetic polyneuropathy, with long-term current use of insulin ([facility]) E11.42, Z79.4    Essential hypertension I10    CLINT (obstructive sleep apnea) G47.33    Tobacco abuse disorder Z72.0    GERD (gastroesophageal reflux disease) K21.9    Anxiety and depression F41.9, F32.9    Iron deficiency anemia D50.9    CAD (coronary artery disease) I25.10    Mitral valve regurgitation I34.0    COPD (chronic obstructive pulmonary disease) ([facility]) J44.9    Vitamin D deficiency E55.9    Dyslipidemia E78.5    Abel's esophagus K22.70    Viral hepatitis C B19.20    Pulmonary nodule R91.1    Class 2 severe obesity due to excess calories with serious comorbidity and body mass index (BMI) of 39.0 to 39.9 in adult ([facility]) E66.01, Z68.39    Bipolar disorder ([facility]) F31.9    Pulmonary hypertension ([facility]) I27.20    Lymphedema of both lower extremities I89.0    Habitual self-excoriation F42.4    Ulcers of both lower legs, limited to breakdown of skin [facility] L97.911, L97.921    Arthritis M19.90    Cardiomyopathy ([facility]) I42.9    Chronic systolic congestive heart failure ([facility]) I50.22    Peripheral venous insufficiency I87.2    PAD (peripheral artery disease) ([facility]) I73.9    Morbid obesity with BMI of 40.0-44.9, adult ([facility]) E66.01, Z68.41    CKD (chronic kidney disease) stage 4, GFR 15-29 ml/min ([facility]) N18.4    Ischemic heart disease I25.9    Moderate protein-calorie malnutrition ([facility]) E44.0    Diabetes mellitus type 2 with complications ([facility]) K51.4    CHF (congestive heart failure), NYHA class I, acute on chronic, combined ([facility]) I50.43    Generalized weakness R53.1    Acute kidney injury superimposed on CKD ([facility]) N17.9, N18.9    ESRD (end stage renal disease) on dialysis ([facility] N18.6, Z99.2    Bilateral lower leg cellulitis L03.116, L03.115       Isolation/Infection:   Isolation            No Isolation          Patient Infection Status       Infection Onset Added Last Indicated Last Indicated By Review Planned Expiration Resolved Resolved By    None active    Resolved    COVID-19 Rule Out 08/03/21 08/03/21 08/03/21 COVID-19, Rapid (Ordered)   08/03/21 Rule-Out Test Resulted    COVID-19 Rule Out 01/11/21 01/11/21 01/11/21 COVID-19 (Ordered)   01/11/21 Rule-Out Test Resulted    COVID-19 Rule Out 12/21/20 12/21/20 12/21/20 COVID-19 (Ordered)   12/22/20 Rule-Out Test Resulted    COVID-19 Rule Out 11/30/20 11/30/20 11/30/20 COVID-19 (Ordered)   11/30/20 Rule-Out Test Resulted            Nurse Assessment:  Last Vital Signs: BP 93/65   Pulse 81   Temp 97.5 °F (36.4 °C) (Oral)   Resp 18   Ht 5' 4\" (1.626 m)   Wt 239 lb 3.2 oz (108.5 kg)   LMP 10/24/2012   SpO2 98%   BMI 41.06 kg/m²     Last documented pain score (0-10 scale): Pain Level: 8  Last Weight:   Wt Readings from Last 1 Encounters:   09/07/21 239 lb 3.2 oz (108.5 kg)     Mental Status:  oriented and alert    IV Access:  - None  - Dialysis Catheter  - site  right, insertion date: subclavian    Nursing Mobility/ADLs:  Walking   Assisted  Transfer  Assisted  Bathing  Assisted  Dressing  Assisted  Toileting  Assisted  Feeding  Independent  Med Admin  Independent  Med Delivery   whole    Wound Care Documentation and Therapy:  Wound 01/11/21 Chest (Active)   Number of days: 239       Wound 04/22/21 Leg Left; Lower; Anterior; Lateral wounds in clusters. (Active)   Number of days: 139       Wound 04/22/21 Toe (Comment  which one) Left cluster tip of 1 + 2, dorsal on toe 3 brown and yellow slough. (Active)   Wound Image   08/08/21 0053   Wound Etiology Diabetic 09/08/21 0832   Dressing Status Dry; Intact 09/08/21 0832   Wound Cleansed Cleansed with saline 08/09/21 1217   Dressing/Treatment Open to air;Betadine swabs/povidone iodine 08/09/21 1217   Dressing Change Due 08/10/21 08/09/21 1217   Wound Assessment Eschar dry 09/08/21 0832   Drainage Amount None 09/08/21 0832   Odor None 09/08/21 0832   Shaye-wound Assessment Dry/flaky 09/08/21 0832   Margins Attached edges; Defined edges 08/09/21 1217   Number of days: 139       Wound 04/22/21 Leg Right (Active)   Number of days: 139       Wound 07/06/21 Radial Distal;Left skin tear, steri strips applied (Active)   Number of days: 64       Wound 07/29/21 Left;Right; Outer; Lower cluster of venous statis wounds healing (dry and intact) (Active)   Wound Image    08/09/21 1217   Wound Etiology Venous 09/08/21 0832   Dressing Status Clean;Dry; Intact 09/08/21 0832   Wound Cleansed Cleansed with saline 08/09/21 1217   Dressing/Treatment Alginate with Ag;ABD;Roll gauze 08/09/21 1217   Dressing Change Due 08/10/21 08/09/21 1217   Wound Assessment Pink/red 08/09/21 1217   Drainage Amount Moderate 08/09/21 1217   Drainage Description Serous 08/09/21 1217   Odor None 08/11/21 2115   Shaye-wound Assessment Edematous; Hemosiderin staining (brown yellow) 08/09/21 1217   Margins Defined edges 08/09/21 1217   Number of days: 41       Wound 08/08/21 Radial Left; Anterior;Posterior scattered small skin tears; arm is bruised as well (Active)   Wound Image   08/08/21 0053   Dressing Status Clean;Dry;New drainage noted 08/13/21 0732   Wound Cleansed Wound cleanser 08/08/21 0053   Dressing/Treatment Xeroform;ABD;Roll gauze 08/08/21 0053   Wound Assessment Dry;Pink/red;Bleeding 08/08/21 0053   Drainage Amount Small 08/08/21 0053   Drainage Description Other (Comment) 08/08/21 0053   Odor None 08/11/21 2115   Shaye-wound Assessment Fragile 08/08/21 0053   Number of days: 31        Elimination:  Continence:   · Bowel:  Yes  · Bladder: Yes  Urinary Catheter: None   Colostomy/Ileostomy/Ileal Conduit: No       Date of Last BM: 9/9/21    Intake/Output Summary (Last 24 hours) at 9/8/2021 1850  Last data filed at 9/8/2021 1239  Gross per 24 hour   Intake 240 ml   Output --   Net 240 ml     I/O last 3 completed shifts: In: 240 [P.O.:240]  Out: -     Safety Concerns: At Risk for Falls    Impairments/Disabilities:      None and wounds to BLE--wrapped    Nutrition Therapy:  Current Nutrition Therapy:   - Oral Diet:  Carb Control 4 carbs/meal (1800kcals/day)    Routes of Feeding: Oral  Liquids: Thin Liquids  Daily Fluid Restriction: no  Last Modified Barium Swallow with Video (Video Swallowing Test): not done    Treatments at the Time of Hospital Discharge:   Respiratory Treatments: see MAR  Oxygen Therapy:  is not on home oxygen therapy. Ventilator:    - No ventilator support    Rehab Therapies: Physical Therapy and Occupational Therapy  Weight Bearing Status/Restrictions: No weight bearing restirctions  Other Medical Equipment (for information only, NOT a DME order):  walker and bedside commode  Other Treatments: ***    Patient's personal belongings (please select all that are sent with patient):  None    RN SIGNATURE:  Electronically signed by Grover Lockett RN on 9/10/21 at 11:43 AM EDT    CASE MANAGEMENT/SOCIAL WORK SECTION    Inpatient Status Date: 9/7/2021    Readmission Risk Assessment Score:  Readmission Risk              Risk of Unplanned Readmission:  67           Discharging to Facility/ Agency   Name: [facility]  Address: [address] [place] [address], [facility]  Phone: [phone]  Fax:  [phone]  ·     Dialysis Facility (if applicable)   · Name:  · Address:  · Dialysis Schedule:  · Phone:  · Fax:    / signature: Electronically signed by Leroy Estrada RN on 9/10/21 at 10:34 AM EDT    PHYSICIAN SECTION    Prognosis: Fair    Condition at Discharge: Stable    Rehab Potential (if transferring to Rehab): Fair    Recommended Labs or Other Treatments After Discharge: PT and OT. Continue HD. Wound care at the [facility].  OP follow up with Dr Bismark Gonsales at [facility]    From Dr. Benja Dee -- current leg dressings and mild compression wraps can remain in place for up to a week (applied Sep 8), if there is no major strike-through of drainage, no severe pain, etc. If they do need to be changed before seeing me as an outpatient (if that IS the plan), suggest Triad dressing to any areas of fibrosis or necrosis on the leg ulcers, silver alginate, ABD pads, Kerlix, Ace wraps. Daily Betadine to the left toe ulcers. Electronically signed by Rashad Huerta MD on 9/8/2021 at 6:53 PM    Physician Certification: I certify the above information and transfer of Rona Concepcion  is necessary for the continuing treatment of the diagnosis listed and that she requires [facility] for less 30 days.      Update Admission H&P: No change in H&P    PHYSICIAN SIGNATURE:  Electronically signed by Yue Taylor MD on 9/10/21 at 8:54 AM EDT

## 2021-09-08 NOTE — CONSULTS
[facility] Consult Note           Reason for Consult:  End stage renal disease  Requesting Physician:  Dr. Alexandra Champion    Chief Complaint:  Leg pain -> Osteomyelitis of left foot  History Obtained From:  patient, electronic medical record    History of Present Ilness:    60year old female with ESRD on HD Tues-Thurs-Sat admitted with leg pain -> osteomyelitis of left foot. We have been asked to assist in further dialysis care. Last had HD on 9/6 with 4.5 liters removed, post-weight of 104.5 kg. Denies any shortness of breath, intake adequate, +weak, no fevers, no abdominal pain.     Past Medical History:        Diagnosis Date    Acute blood loss anemia 08/05/2020    Acute on chronic combined systolic and diastolic congestive heart failure ([address] 01/10/2020    Acute on chronic right-sided heart failure ([address] 08/2020    Alcohol dependence ([address] 03/10/2010    Angina pectoris (HCC)     Arthritis     Asthma     Atrial fibrillation (HCC)     CAD (coronary artery disease)     Cardiomyopathy ([address]     Cellulitis 06/03/2020    Charcot's joint of ankle, left     COPD (chronic obstructive pulmonary disease) ([address]     Diabetic ulcer of left foot associated with type 2 diabetes mellitus, limited to breakdown of skin ([address] 01/18/2020    Diabetic ulcer of toe of right foot associated with type 2 diabetes mellitus ([address] 01/18/2020    Enthesopathy     ESRD (end stage renal disease) on dialysis ([address]     Tues-Thurs-Sat    GERD (gastroesophageal reflux disease)     Hyperlipidemia     Hypertension     Left renal artery stenosis ([address] 08/2020    MI (myocardial infarction) ([address] 10/07/2019    NSTEMI    Morbid obesity ([address]     Osteoarthritis     Peripheral neuropathy     Peripheral vascular disease ([address]     Polyarthritis     Positive FIT (fecal immunochemical test) 02/28/2020    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 steal syndrome 09/01/2020    Traumatic perinephric hematoma, left, initial encounter 08/04/2020       Past Surgical History:        Procedure Laterality Date    ARTERIAL BYPASS SURGRY Left 01/06/2016    Axillary/Subclavian to Brachial Bypass w/reversed SVG    CARDIAC CATHETERIZATION  03/27/2018    Dr. Getachew Guerra - at [address] [place]  01/20/2020    Dr. Kareen Coleman      pancreatitis post surgery    CORONARY ANGIOPLASTY WITH STENT PLACEMENT  03/16/2018    MELODY- 3.5 x 38 and 3.5 x 20 to Cx    CORONARY ANGIOPLASTY WITH STENT PLACEMENT  01/03/2018    MELODY- 3.0 x 38 and 2.75 x 26 and 2.75 x 8 and 2.75 x 22 to the LAD    CORONARY ANGIOPLASTY WITH STENT PLACEMENT  10/07/2019    MELODY- 2.5 x 8 to [address] GRAFT N/A 01/27/2020    CORONARY ARTERY BYPASS GRAFTING X 1, ON PUMP BEATING HEART, INTERNAL MAMMARY ARTERY TO LEFT ANTERIOR DESCENDING ARTERY, VAS CATH INSERTION, URI, PLATELET GEL APPLICATION, 5 LEVEL BILATERAL INTERCOSTAL NERVE BLOCK, STERNAL PLATING performed by Kristina Gonzalez MD at [address] [place] [address] Right 05/16/2019    fem-pop, performed by Awilda Mcgee MD at [address] [facility]  02/14/2019    CardioMEMs insertion for CHF [id number]  Serial #[id number]  MRI Conditional    IR NONTUNNELED VASCULAR CATHETER  07/09/2021    IR NONTUNNELED VASCULAR CATHETER 7/9/2021 [facility]    IR TUNNELED CATHETER PLACEMENT GREATER THAN 5 YEARS  7/29/2021    IR TUNNELED CATHETER PLACEMENT GREATER THAN 5 YEARS 7/29/2021 [facility]    ROTATOR CUFF REPAIR Left 04/22/2016    TONSILLECTOMY      TRANSESOPHAGEAL ECHOCARDIOGRAM  01/26/2020    TRANSLUMINAL ANGIOPLASTY Left 10/08/2019    with stenting, at [facility]    TRANSLUMINAL ANGIOPLASTY Left 04/29/2020    of the [facility] re-occlusion    TUMOR EXCISION      benign tumors X 2 chest & axilla    UPPER GASTROINTESTINAL ENDOSCOPY  04/25/2013    BX barretts, HH, gastritis    UPPER GASTROINTESTINAL ENDOSCOPY  12/10/2015    UPPER GASTROINTESTINAL ENDOSCOPY  01/06/2017    Gastritis    VASCULAR SURGERY Left 12/08/2015    upper extremity and mesenteric angiogram (diagnostic only)    VASCULAR SURGERY Bilateral 07/07/2020    diagnostic lower extremity angiogram only    VASCULAR SURGERY Left 08/04/2020    angioplasty and stent of renal artery    VASCULAR SURGERY Left 08/05/2020    coil embolization of branch renal artery vessel for bleeding    VASCULAR SURGERY Left 09/01/2020    angioplasty and stenting of subclavian artery       Home Medications:    No current facility-administered medications on file prior to encounter. Current Outpatient Medications on File Prior to Encounter   Medication Sig Dispense Refill    amiodarone (CORDARONE) 200 MG tablet Take 200 mg by mouth daily      ARIPiprazole (ABILIFY) 15 MG tablet Take 15 mg by mouth daily      busPIRone (BUSPAR) 30 MG tablet Take 30 mg by mouth 2 times daily      carvedilol (COREG) 3.125 MG tablet Take 3.125 mg by mouth 2 times daily (with meals)      doxepin (SINEQUAN) 25 MG capsule Take 25 mg by mouth nightly      cephALEXin (KEFLEX) 500 MG capsule Take 1 capsule by mouth 3 times daily       HYDROcodone-acetaminophen (NORCO) 5-325 MG per tablet Take by mouth every 8 hours as needed.  albuterol sulfate  (90 Base) MCG/ACT inhaler       silver sulfADIAZINE (SILVADENE) 1 % cream Apply topically daily Apply topically daily.       Insulin Degludec (TRESIBA FLEXTOUCH) 100 UNIT/ML SOPN Inject 5 Units into the skin nightly (Patient not taking: Reported on 8/31/2021) 10 pen 5    hydrOXYzine (VISTARIL) 25 MG capsule Take 10 mg by mouth 3 times daily as needed for Itching (Patient not taking: Reported on 8/31/2021)      metoprolol tartrate (LOPRESSOR) 25 MG tablet Take 0.5 tablets by mouth 2 times daily (Patient not taking: Reported on 8/31/2021)      torsemide (DEMADEX) 100 MG tablet Take 1 tablet by mouth daily (Patient not taking: Reported on 8/31/2021)      b complex-C-folic acid (NEPHROCAPS) 1 MG capsule Take 1 capsule by mouth daily (Patient not taking: Reported on 8/31/2021)      midodrine (PROAMATINE) 10 MG tablet Take 1 tablet by mouth 3 times daily (with meals) (Patient not taking: Reported on 8/31/2021)      pantoprazole (PROTONIX) 40 MG tablet Take 1 tablet by mouth daily      acetaminophen (TYLENOL) 650 MG extended release tablet Take 650 mg by mouth every 4 hours as needed for Pain      Umeclidinium Bromide (INCRUSE ELLIPTA) 62.5 MCG/INH AEPB Inhale 1 Dose into the lungs daily (Patient not taking: Reported on 8/31/2021)      fluticasone-salmeterol (ADVAIR) 250-50 MCG/DOSE AEPB Inhale 1 puff into the lungs 2 times daily (Patient not taking: Reported on 8/31/2021)      apixaban (ELIQUIS) 5 MG TABS tablet Take 1 tablet by mouth 2 times daily (Patient taking differently: Take 2.5 mg by mouth 2 times daily ) 60 tablet     miconazole (MICOTIN) 2 % powder Apply topically 2 times daily. (Patient not taking: Reported on 8/31/2021) 45 g 1    Blood Glucose Monitoring Suppl (ONE TOUCH ULTRA 2) w/Device KIT USE TO MONITOR BLOOD GLUCOSE LEVELS 1 kit 0    ONE TOUCH ULTRASOFT LANCETS MISC USE TO TEST BLOOD GLUCOSE LEVELS 4 TIMES DAILY (Patient not taking: Reported on 8/31/2021) 150 each 3    blood glucose test strips (ASCENSIA AUTODISC VI;ONE TOUCH ULTRA TEST VI) strip USE TO MONITOR BLOOD GLUCOSE LEVELS 4 TIMES DAILY 150 each 3    nitroGLYCERIN (NITROSTAT) 0.4 MG SL tablet Place 1 tablet under the tongue every 5 minutes as needed for Chest pain up to max of 3 total doses.  If no relief after 1 dose, call 911. 25 tablet 0    Insulin Pen Needle (B-D ULTRAFINE III SHORT PEN) 31G X 8 MM MISC Five shots a day 200 each 5    atorvastatin (LIPITOR) 80 MG tablet TAKE 1 TABLET BY MOUTH EVERY DAY AT NIGHT (Patient not taking: Reported on 8/31/2021) 90 tablet 3    blood glucose test strips (FREESTYLE LITE) strip USE TO CHECK BLOOD GLUCOSE LEVELS FOUR TIMES DAILY 200 strip 10    benztropine (COGENTIN) 1 MG tablet Take 1 mg by mouth nightly  (Patient not taking: Reported on 8/31/2021)      INSULIN SYRINGE .5CC/29G 29G X 1/2\" 0.5 ML MISC 1 each by Does not apply route daily 100 each 3    clopidogrel (PLAVIX) 75 MG tablet TAKE 1 TABLET BY MOUTH EVERY DAY 30 tablet 5    lamoTRIgine (LAMICTAL) 200 MG tablet Take 200 mg by mouth 2 times daily          Allergies:  Lisinopril    Social History:    Social History     Socioeconomic History    Marital status: Single     Spouse name: Not on file    Number of children: Not on file    Years of education: Not on file    Highest education level: Not on file   Occupational History    Not on file   Tobacco Use    Smoking status: Current Every Day Smoker     Packs/day: 1.00     Years: 30.00     Pack years: 30.00     Types: Cigarettes    Smokeless tobacco: Never Used   Vaping Use    Vaping Use: Never used   Substance and Sexual Activity    Alcohol use: Not Currently     Comment: quit 2006     Drug use: Never    Sexual activity: Yes     Partners: Male   Other Topics Concern    Not on file   Social History Narrative    Not on file     Social Determinants of Health     Financial Resource Strain:     Difficulty of Paying Living Expenses:    Food Insecurity:     Worried About Running Out of Food in the Last Year:     Ran Out of Food in the Last Year:    Transportation Needs:     Lack of Transportation (Medical):      Lack of Transportation (Non-Medical):    Physical Activity:     Days of Exercise per Week:     Minutes of Exercise per Session:    Stress:     Feeling of Stress :    Social Connections:     Frequency of Communication with Friends and Family:     Frequency of Social Gatherings with Friends and Family:     Attends Christian Services:     Active Member of Clubs or Organizations:     Attends Club or Organization Meetings:     Marital Status:    Intimate Partner Violence:     Fear of Current or Ex-Partner:     Emotionally Abused:     Physically Abused:     Sexually Abused:        Family History:   Family History   Problem Relation Age of Onset    Heart Disease Maternal Grandmother     Cancer Mother         lung and brain cancer    Cancer Maternal Aunt         lung and brain cancer       Review of Systems:   Pertinent positives stated above in HPI. Remainder of 10 point review of systems were reviewed and were negative.     Physical exam:   Constitutional:  VITALS:  BP 95/62   Pulse 89   Temp 97.4 °F (36.3 °C) (Oral)   Resp 16   Ht 5' 4\" (1.626 m)   Wt 239 lb 3.2 oz (108.5 kg)   LMP 10/24/2012   SpO2 99%   BMI 41.06 kg/m²   Gen: alert, awake, ill-appearing  Skin: no rash, turgor wnl  Heent:  eomi, mmm  Neck: no bruits or jvd noted, thyroid normal  Cardiovascular:  S1, S2 without m/r/g  Respiratory: CTA B without w/r/r; respiratory effort normal  Abdomen:  +bs, soft, nt, nd, no hepatosplenomegaly  Ext: ++ lower extremity edema with open ulcerations, dry gangrene involving left toes  Access: [facility]  Psychiatric: mood and affect appropriate; judgement and insight intact  Musculoskeletal:  Rom, muscular strength intact; digits, nails normal    Data/  CBC:   Lab Results   Component Value Date    WBC 9.6 09/07/2021    RBC 3.47 09/07/2021    HGB 9.2 09/07/2021    HCT 29.5 09/07/2021    MCV 85.0 09/07/2021    MCH 26.7 09/07/2021    MCHC 31.4 09/07/2021    RDW 25.5 09/07/2021     09/07/2021    MPV 8.8 09/07/2021     BMP:    Lab Results   Component Value Date     09/07/2021    K 4.2 09/07/2021    CL 95 09/07/2021    CO2 18 09/07/2021    BUN 33 09/07/2021    LABALBU 3.6 09/07/2021    CREATININE 3.6 09/07/2021    CALCIUM 9.1 09/07/2021    GFRAA 16 09/07/2021    LABGLOM 13 09/07/2021    GLUCOSE 291 09/07/2021         Assessment/    - End stage renal disease - on HD Tues-Thurs-Sat    - Osteomyelitis of left foot/BLE cellulitis - plans per Podiatry/ID    - Lactic acidosis - better    - CHF - systolic/diastolic - EF 48%, grade 3 diastolic dysfunction    - Hypotension - on scheduled midodrine    - Anemia of chronic disease - Hb 9.2, BELKIS with HD, Epogen 2,400 units qHD      Plan/    - HD tomorrow per schedule with UF as tolerated, .5 kg which she has not been reaching  - Continue scheduled midodrine 10 mg po tid  - BELKIS with HD  - Trend labs, bp's      Thank you for the consultation. Please do not hesitate to call with questions. ____________________________________  Mayuri Gentile MD  [facility]  [email or web address]  Office: [phone]

## 2021-09-08 NOTE — PROGRESS NOTES
Hand off report given to [address] [place] [address] [address] Patient in bed asleep. Bed in lowest position. Call light in reach.   Ricarda Velasquez RN

## 2021-09-08 NOTE — CONSULTS
[facility] Infectious Disease Consult Note      Olivier Bautista     : 1965    DATE OF VISIT:  2021  DATE OF ADMISSION:  2021       Subjective:     Akin Post is a 60 y.o. female whom I've been asked to see by Dr. Jay Olivera for leg wounds and possible cellulitis. Chief Complaint   Patient presents with    Leg Pain     Pt states that she was sent in by [facility] for possible cellulitis in bilateral lower legs.  Cellulitis      HPI:  Mrs. Shayla Plummer is a woman with a complicated and extensive past medical history (as detailed below), whom I've seen a number of times in the wound care center in the past, typically for small neuropathic diabetic foot ulcers, or for venous-lymphedema lower leg ulcers. It's been a number of months since she was here, and she's been admitted to the hospital a number of times this past year for a variety of acute-on-chronic medical problems. It's still unclear to me whether she came here yesterday because SHE thought her legs were getting worse in the last several days, or whether the staff at [facility] were concerned. She always has some chronic swelling and mild pink-ann marie discoloration of the legs, but in the past several days she tells me that both legs have worsened in tandem, though the left more than the right (more swelling, more redness, more aching pain (both the thighs and lower legs), a few new ulcers, and much more weeping). Denies feeling poorly (acutely), specifically no F/C/D, anorexia, etc. No pus noted from the legs. Her exam here WAS rather impressive in terms of the legs, and after some basic blood work, she was started on empiric IV Abx for cellulitis; wounds were dressed today with silver alginate, and I was asked to see her for a comment on ongoing antibiotic therapy and local care.      Ms. Shayla Plummer has a past medical history of Acute blood loss anemia, Acute on chronic combined systolic and diastolic congestive heart failure [facility]), Acute on chronic right-sided heart failure ([address] Alcohol dependence ([address] Angina pectoris ([address] Arthritis, Asthma, Atrial fibrillation ([address] CAD (coronary artery disease), Cardiomyopathy ([address] Cellulitis, Charcot's joint of ankle, left, Closed nondisplaced fracture of navicular bone of left foot, COPD (chronic obstructive pulmonary disease) ([address] Diabetic ulcer of left foot associated with type 2 diabetes mellitus, limited to breakdown of skin ([address] Diabetic ulcer of toe of right foot associated with type 2 diabetes mellitus ([address] Enthesopathy, ESRD (end stage renal disease) on dialysis ([address] GERD (gastroesophageal reflux disease), Hyperlipidemia, Hypertension, Idiopathic acute pancreatitis, Left renal artery stenosis ([address] MI (myocardial infarction) ([address] Morbid obesity ([address] Osteoarthritis, Peripheral neuropathy, Peripheral vascular disease ([address] Polyarthritis, Positive FIT (fecal immunochemical test), 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 She has a past surgical history that includes Tonsillectomy; Cholecystectomy; tumor excision; Upper gastrointestinal endoscopy (04/25/2013); Upper gastrointestinal endoscopy (12/10/2015); Upper gastrointestinal endoscopy (01/06/2017); Arterial bypass surgry (Left, 01/06/2016); Cardiac catheterization (03/27/2018); Coronary angioplasty with stent (03/16/2018); Rotator cuff repair (Left, 04/22/2016); Coronary angioplasty with stent (01/03/2018); Insertable Cardiac Monitor (02/14/2019); femoral bypass (Right, 05/16/2019); transluminal angioplasty (Left, 10/08/2019); Cardiac catheterization (01/20/2020); Coronary artery bypass graft (N/A, 01/27/2020); vascular surgery (Left, 12/08/2015); transluminal angioplasty (Left, 04/29/2020); vascular surgery (Bilateral, 07/07/2020);  Coronary angioplasty with stent (10/07/2019); transesophageal echocardiogram (01/26/2020); vascular surgery (Left, 08/04/2020); vascular surgery (Left, 08/05/2020); vascular surgery (Left, 09/01/2020); IR NONTUNNELED VASCULAR CATHETER > 5 YEARS (07/09/2021); and IR TUNNELED CVC PLACE WO SQ PORT/PUMP > 5 YEARS (7/29/2021). Her family history includes Cancer in her maternal aunt and mother; Heart Disease in her maternal grandmother. Ms. Víctor Alvarado reports that she has been smoking cigarettes. She has a 30.00 pack-year smoking history. She has never used smokeless tobacco. She reports previous alcohol use. She reports that she does not use drugs.     Current Facility-Administered Medications: vancomycin 1500 mg in dextrose 5% 300 mL IVPB, 1,500 mg, IntraVENous, PRN **OR** vancomycin (VANCOCIN) 750 mg in dextrose 5 % 250 mL IVPB, 750 mg, IntraVENous, PRN  insulin glargine (LANTUS) injection vial 5 Units, 5 Units, SubCUTAneous, Nightly  apixaban (ELIQUIS) tablet 2.5 mg, 2.5 mg, Oral, BID  amiodarone (CORDARONE) tablet 200 mg, 200 mg, Oral, Daily  ARIPiprazole (ABILIFY) tablet 15 mg, 15 mg, Oral, Daily  atorvastatin (LIPITOR) tablet 80 mg, 80 mg, Oral, Nightly  busPIRone (BUSPAR) tablet 30 mg, 30 mg, Oral, BID  carvedilol (COREG) tablet 3.125 mg, 3.125 mg, Oral, BID WC  clopidogrel (PLAVIX) tablet 75 mg, 75 mg, Oral, Daily  HYDROcodone-acetaminophen (NORCO) 5-325 MG per tablet 1 tablet, 1 tablet, Oral, Q6H PRN  lamoTRIgine (LAMICTAL) tablet 200 mg, 200 mg, Oral, BID  pantoprazole (PROTONIX) tablet 40 mg, 40 mg, Oral, Daily  glucose (GLUTOSE) 40 % oral gel 15 g, 15 g, Oral, PRN  dextrose 50 % IV solution, 12.5 g, IntraVENous, PRN  glucagon (rDNA) injection 1 mg, 1 mg, IntraMUSCular, PRN  dextrose 5 % solution, 100 mL/hr, IntraVENous, PRN  sodium chloride flush 0.9 % injection 5-40 mL, 5-40 mL, IntraVENous, 2 times per day  sodium chloride flush 0.9 % injection 5-40 mL, 5-40 mL, IntraVENous, PRN  0.9 % sodium chloride infusion, 25 mL, IntraVENous, PRN  ondansetron (ZOFRAN-ODT) disintegrating tablet 4 mg, 4 mg, Oral, Q8H organomegaly  Musculoskeletal:  no clubbing, cyanosis or petechiae; extremities with no gross effusions, joint misalignment or acute arthritis  Skin: warm, dry, normal turgor; no drug rash; both lower legs with some ann marie venous erythema; to me, definitely no signs of cellulitis on the right side; on the left side there IS more swelling, more redness, some distinct purple color change, a number of superficial ulcers (some pink, some with fibrin and biofilm), serous exudate, tiny blisters. Also a couple of more chronic appearing ulcers at the tips of a couple of left lesser toes; feet are cool, toes a bit dusky, cap refill poor  ______________________________    Recent Labs     09/07/21  1835 08/12/21  0528   WBC 9.6 7.5   HGB 9.2* 8.5*   HCT 29.5* 28.1*   MCV 85.0 82.1    239     Lab Results   Component Value Date    CREATININE 3.6 (H) 09/07/2021     Lab Results   Component Value Date    LABALBU 3.6 09/07/2021     Lab Results   Component Value Date    ALT 13 09/07/2021    AST 12 (L) 09/07/2021     (H) 10/14/2020    ALKPHOS 296 (H) 09/07/2021    BILITOT 1.8 (H) 09/07/2021      Lab Results   Component Value Date    LABA1C 7.9 07/29/2021     Other recent pertinent labs: Admission lactate 2.7, repeat this AM 1.6. Procalcitonin only 0.35. Automated WBC diff with mild neutrophilia, mild lymphopenia.   ______________________________    Recent pertinent micro results:  Admission BCx x 2 negative at almost 24 hours. ______________________________    Recent imaging results (last 7 days):     XR FOOT LEFT (MIN 3 VIEWS)    Result Date: 9/7/2021  Suspected osteomyelitis of the 2nd tuft. Chronic erosion of the 1st tuft. Cellulitis of the forefoot. CT Head WO Contrast    Result Date: 9/3/2021  No evidence of acute intracranial process. CT CERVICAL SPINE WO CONTRAST    Result Date: 9/3/2021  Moderate degenerative disc disease at C5-C6 with associated right-sided bony neural foraminal narrowing. -- Venous insufficiency, chronic skin changes, multifactorial lower extremity edema (CHF, renal failure, the venous disease, lymphedema from that, plus prior ulcers and infections). -- Recent increase in lower extremity edema, redness, pain, ulceration, weeping, but I'm not convinced there's definitely a bacterial cellulitis (no fever, no leukocytosis, borderline procalcitonin, she doesn't feel systemically ill, and the changes were simultaneously on both legs, though worse on the one side). I've seen her have similar changes in the past when she gets fluid overloaded, or when she doesn't consistently elevate her legs or maintain any level of compression. -- She also does have lower extremity PAD, and has had prior interventions. Nothing on the legs looks overtly ischemic, but I'm sure that the left toe ulcers are; those are thankfully stable, though probably complicated by focal active osteo. Treatment recs:     Can stop IV antibiotics. Betadine for the toes; outpatient follow-up with Dr. Amanda Frazier regarding her PAD. I re-dressed and wrapped the lower legs today (Triad to the larger ulcers, Triad + triamcinolone to the inflamed weeping skin around them, then silver alginate, cast padding, Kerlix, 2\" - 4\" - 6\" Ace wraps, from base of toes to just below knees). Ideally these could stay in place for at least a few days, to slowly move some fluid out of her legs. If they remain in good shape for the better part of the week, that's even better. If there is significant strike-through on the surface of the wraps, or if she has a clinical change (fever, chills, increased leg pain, etc), obviously these can all be removed in order to assess her skin and wounds.     Important to keep her legs elevated whenever she's at rest; follow nephrology recommendations for fluid and sodium intake, etc.     Our wound-care NP does not go to [facility], so if she does want to follow up with us for wound-care after discharge, we can work on getting her scheduled (might not be until the week after next). D/W Dr. Derian Benavidez.      Electronically signed by Daniel Costa MD on 9/8/2021 at 6:26 PM.

## 2021-09-09 NOTE — PROGRESS NOTES
Physical Therapy    Patient is off the floor for hemodialysis hence unavailable for PT session. Patient will be followed up later as schedule permits. No charge.     Augustine Rodríguez MS PT, # [id number]

## 2021-09-09 NOTE — PROGRESS NOTES
Lispro, Midodrine, Sodium bicarb and flush given, rest of meds held for dialysis.   Milana Bourne RN

## 2021-09-09 NOTE — FLOWSHEET NOTE
09/09/21 1530   Vital Signs   Temp 97.3 °F (36.3 °C)   Temp Source Oral   Pulse 80   Heart Rate Source Monitor   Resp 18   BP (!) 96/58   BP Location Left upper arm   Patient Position Semi fowlers   Level of Consciousness Alert (0)   MEWS Score 2   Oxygen Therapy   SpO2 100 %   Pulse Oximeter Device Mode Intermittent   Pulse Oximeter Device Location Finger   O2 Device None (Room air)     Patient returned from dialysis. In bed eating lunch vitals stable shown above. Medications given.   Kacy Guerrero RN

## 2021-09-09 NOTE — PLAN OF CARE
Problem: Falls - Risk of:  Goal: Will remain free from falls  Description: Will remain free from falls  9/9/2021 1730 by Dave Callejas RN  Note: Serena James remained free from falls this shift, call lights were used when needing to get up. Patient was assisted to reposition safely. 9/9/2021 0540 by Greg Benavides RN  Outcome: Ongoing  Note: Serena James remained safe and free of falls during this shift, a bed alarm was used to ensure safety.

## 2021-09-09 NOTE — PLAN OF CARE
Problem: Falls - Risk of:  Goal: Will remain free from falls  Description: Will remain free from falls  Outcome: Ongoing  Note: Bisi Ran remained safe and free of falls during this shift, a bed alarm was used to ensure safety. Problem: Skin Integrity:  Goal: Will show no infection signs and symptoms  Description: Will show no infection signs and symptoms  Outcome: Ongoing  Note: Poppy's skin showed no signs of skin breakdown during this shift.

## 2021-09-09 NOTE — PROGRESS NOTES
[facility] Progress Note           Subjective/   60y.o. year old female who we are seeing in consultation for ESRD on HD. HPI:  Last had HD on 9/6 with 4.5 liters removed, post-weight of 104.5 kg. Denies any shortness of breath. ROS:  Intake reduced, no fevers. Objective/   GEN:  Chronically ill, BP (!) 87/59   Pulse 87   Temp 97.9 °F (36.6 °C) (Oral)   Resp 16   Ht 5' 4\" (1.626 m)   Wt 241 lb 14.4 oz (109.7 kg)   LMP 10/24/2012   SpO2 95%   BMI 41.52 kg/m²   HEENT: non-icteric, no JVD  CV: S1, S2 without m/r/g; ++ LE edema with open ulcerations  RESP: CTA B without w/r/r; breathing wnl  ABD: +bs, soft, nt, no hsm  SKIN: warm, no rashes  ACCESS: R [facility]    Data/  Recent Labs     09/07/21  1835 09/09/21  0423   WBC 9.6 8.0   HGB 9.2* 9.4*   HCT 29.5* 31.5*   MCV 85.0 91.1    242     Recent Labs     09/07/21  1835 09/09/21  0423   * 128*   K 4.2 5.4*   CL 95* 94*   CO2 18* 13*   GLUCOSE 291* 201*   PHOS  --  8.4*   BUN 33* 47*   CREATININE 3.6* 4.9*   LABGLOM 13* 9*   GFRAA 16* 11*       Assessment/     - End stage renal disease - on HD Tues-Thurs-Sat     - Osteomyelitis of left foot/BLE cellulitis - plans per Podiatry/ID     - Metabolic acidosis - worsening, lactate wnl now     - CHF - systolic/diastolic - EF 00%, grade 3 diastolic dysfunction     - Hypotension - on scheduled midodrine     - Anemia of chronic disease - Hb 9.4, BELKIS with HD, Epogen 2,400 units qHD    Plan/     - HD today per schedule with UF as tolerated with crit line monitoring & prn albumin   .5 kg which she has not been reaching  - Start sodium bicarbonate 650 mg po tid  - Continue scheduled midodrine 10 mg po tid  - BELKIS with HD  - Trend labs, bp's    Case d/w Admitting team  Okay for discharge after HD today    ____________________________________  uQan Marte MD  [facility]  [email or web address]  Office: [phone]

## 2021-09-09 NOTE — PROGRESS NOTES
Progress Note    Admit Date:  9/7/2021    Subjective:  Ms. Carla Altamirano is feeling better. Seen by ID. Antibiotics stopped. ID feels leg changes are chronic with no active cellulitis. She does need long term wound care and efforts to decrease fluid overload. Discussed with nephrology about pulilng more fluids at HD. Objective:   /61   Pulse 69   Temp 97 °F (36.1 °C)   Resp 16   Ht 5' 4\" (1.626 m)   Wt 233 lb 0.4 oz (105.7 kg)   LMP 10/24/2012   SpO2 95%   BMI 40.00 kg/m²        Intake/Output Summary (Last 24 hours) at 9/9/2021 1529  Last data filed at 9/9/2021 0244  Gross per 24 hour   Intake 300 ml   Output 50 ml   Net 250 ml       Physical Exam:    Gen: No distress. Alert. Chronically ill-appearing  Eyes: PERRL. No sclera icterus. No conjunctival injection. ENT: No discharge. Pharynx clear. Neck: Trachea midline. Normal thyroid. Resp: No accessory muscle use. No crackles. No wheezes. No rhonchi. No dullness on percussion. CV: Regular rate. Regular rhythm. No murmur or rub. + edema. GI: Non-tender. Non-distended. No masses. No organomegaly. Normal bowel sounds. No hernia. Skin: Both legs show multiple open areas with ulcers. Left toes show mild dry gangrene. Peripheral pulses are diminished. Some purulent drainage is seen. Lymph: No cervical LAD. No supraclavicular LAD. M/S: No cyanosis. No joint deformity. No clubbing. Neuro: Moves all 4 extremities  Psych: Oriented x 3. No anxiety or agitation.      Scheduled Meds:   sodium bicarbonate  650 mg Oral TID    insulin glargine  5 Units SubCUTAneous Nightly    apixaban  2.5 mg Oral BID    amiodarone  200 mg Oral Daily    ARIPiprazole  15 mg Oral Daily    atorvastatin  80 mg Oral Nightly    busPIRone  30 mg Oral BID    carvedilol  3.125 mg Oral BID WC    clopidogrel  75 mg Oral Daily    lamoTRIgine  200 mg Oral BID    pantoprazole  40 mg Oral Daily    sodium chloride flush  5-40 mL IntraVENous 2 times per day    insulin lispro 0-12 Units SubCUTAneous TID     insulin lispro  0-6 Units SubCUTAneous Nightly    midodrine  10 mg Oral TID     budesonide-formoterol  2 puff Inhalation BID       Continuous Infusions:   dextrose      sodium chloride         PRN Meds:  heparin (porcine), HYDROcodone-acetaminophen, glucose, dextrose, glucagon (rDNA), dextrose, sodium chloride flush, sodium chloride, ondansetron **OR** ondansetron, polyethylene glycol, acetaminophen **OR** acetaminophen      Data:  CBC:   Recent Labs     09/07/21 1835 09/09/21 0423   WBC 9.6 8.0   HGB 9.2* 9.4*   HCT 29.5* 31.5*   MCV 85.0 91.1    242     BMP:   Recent Labs     09/07/21 1835 09/09/21 0423   * 128*   K 4.2 5.4*   CL 95* 94*   CO2 18* 13*   PHOS  --  8.4*   BUN 33* 47*   CREATININE 3.6* 4.9*     LIVER PROFILE:   Recent Labs     09/07/21 1835   AST 12*   ALT 13   BILITOT 1.8*   ALKPHOS 296*     XR FOOT LEFT (MIN 3 VIEWS)   Final Result   Suspected osteomyelitis of the 2nd tuft. Chronic erosion of the 1st tuft. Cellulitis of the forefoot. Assessment:  Principal Problem:    Ulcers of both lower legs, limited to breakdown of skin ([facility])  Active Problems:    CAD (coronary artery disease)    CLINT (obstructive sleep apnea)    Anxiety and depression    Chronic systolic congestive heart failure (HCC)    PAD (peripheral artery disease) ([facility])    Morbid obesity with BMI of 40.0-44.9, adult ([facility])    Diabetes mellitus type 2 with complications ([facility]    ESRD (end stage renal disease) on dialysis ([facility]  Resolved Problems:    Cellulitis      Plan:    Chronic Wounds to BLE- cellulitis ruled out. Chronic LE Swelling  - check PCT, blood cx x2  - got Vanc and Cefepime D#1 stopped after ID saw patient. PRN [place] for pain  - wound care consult, ID consulted     Suspected Osteomyelitis-ruled out after podiatry saw patient.    - XR left foot showed suspected osteomyelitis of the 2nd tuft, chronic erosion of the 1st tuft  -Stopped Vanc and Cefepime D#1  - Podiatry consulted     ITALO  ESRD  - recently started on HD  - has right TDC  - HD on T,Th, S  - Nephrology consulted   - try to pull more fluid at HD,     Hypotension  - on Midodrine     Lactic Acidosis  - trended to normal  - no sepsis     Chronic Combined Diastolic and Systolic CHF  Moderate Pulmonary HTN  Ischemic Cardiomyopathy  - appears compensated  - last echo 4/2021: EF 25%, grade III DD  - has not yet f/u with EP for defibrillator  - cont Coreg, statin     CAD s/p CABG/Stents  - no chest pain  - cont Plavix, Lipitor, Coreg     PVD  - f/w Dr. Kim Macedo  - cont Plavix, Statin     Hx of PAF  - in NSR  - AC on Eliquis  - cont Amiodarone, Coreg     Type II DM  - uncontrolled  - last Hgb A1c: 7.9 on 7/29  - add lantus 5 units nightly, medium dose SSI  - POC Glucose, carb control diet      COPD  - no AE  - cont Symbicort     GERD  - cont Protonix     Normocytic Anemia  - Hgb 9.2  - stable     HLD  - cont Lipitor     Anxiety  - cont Buspar     CLINT  - not compliant with CPAP     DVT Prophylaxis: Eliquis  Diet: ADULT DIET; Regular; 3 carb choices (45 gm/meal)  Code Status: Full Code       Discharge planning. Pre cert pending.      Jenniffer Enriquez MD, MD 9/9/2021 3:29 PM

## 2021-09-09 NOTE — PROGRESS NOTES
Physician Progress Note      Florencia Elizondo  CSN #:                  [id number]  :                       1965  ADMIT DATE:       2021 5:13 PM  [address] [place] DATE:  RESPONDING  PROVIDER #:        Laith Forrest MD          QUERY TEXT:    Pt admitted with BLE cellulitis. Pt noted to have uncontrolled DM with A1C-7.9   . If possible, please document in progress notes and discharge summary the   relationship, if any, between cellulitis and DM.     The medical record reflects the following:  Risk Factors: DM, CHF, ESRD, PVD,CAD  Clinical Indicators: Chronic wounds to BLE-venous leg ulcers with venous   insufficiency, uncontrolled DM  Treatment: Vanco, Cefipime, wound care, ID consult, add lantus nightly, med   dose SSI, carb controlled diet    Thank you for your assistance,  Kimmy Whiting RN,BSN,CCDS,CRCR  Options provided:  -- BLE cellulitis associated with Diabetes  -- BLE cellulitis unrelated to Diabetes  -- Other - I will add my own diagnosis  -- Disagree - Not applicable / Not valid  -- Disagree - Clinically unable to determine / Unknown  -- Refer to Clinical Documentation Reviewer    PROVIDER RESPONSE TEXT:    Chronic leg ulceration without cellulitis    Query created by: Rajesh Santana on 2021 12:37 PM      Electronically signed by:  Laith Forrest MD 2021 5:11 PM

## 2021-09-09 NOTE — PROGRESS NOTES
Pt completed 4 hours of hemodialysis and removed 4 liters of fluid without difficulty. Catheter functioned well at 400ml/min blood flow rate and dwelled with heparin post treatment.         09/09/21 1100 09/09/21 1515   Vital Signs   Temp 97.4 °F (36.3 °C) 97 °F (36.1 °C)   Pulse 82 69   BP (!) 88/53 100/61   Height and Weight   Weight 242 lb 4.6 oz (109.9 kg) 233 lb 0.4 oz (105.7 kg)   Weight Method Bed scale Bed scale

## 2021-09-09 NOTE — PROGRESS NOTES
Hand off report given to jeremy FONSECA at bedside. Patient in bed stable with no current needs.   Marcell Amato RN

## 2021-09-10 NOTE — DISCHARGE SUMMARY
Name:  Tommy Sol  Room:  /1212-45  MRN:    [id number]    Discharge Summary      This discharge summary is in conjunction with a complete physical exam done on the day of discharge. Discharging Physician: 9/10/2021       Admit: 9/7/2021  Discharge:   9/10/2021     Diagnoses this Admission    Principal Problem:    Ulcers of both lower legs, limited to breakdown of skin ([address]  Active Problems:    CAD (coronary artery disease)    CLINT (obstructive sleep apnea)    Anxiety and depression    Chronic systolic congestive heart failure (HCC)    PAD (peripheral artery disease) ([address]    Morbid obesity with BMI of 40.0-44.9, adult ([address]    Diabetes mellitus type 2 with complications ([address]    ESRD (end stage renal disease) on dialysis ([address]    Ulcer of left lower leg, limited to breakdown of skin ([address]  Resolved Problems:    Cellulitis          Procedures (Please Review Full Report for Details)  HD    Consults    Nephrology  ID    HPI:      68-year-old white female with multiple medical problems. She started on hemodialysis a month ago. She has ESRD, paroxysmal atrial fibrillation, CAD status post CABG status post stents, COPD, diabetes mellitus type 2 with ESRD, HLD, hypertension, GERD who was sent to the emergency room with complaints of pain in her legs and redness. She has chronic edema of her legs and nephrology has been trying to maintain a negative fluid balance at hemodialysis. Patient states her legs have been weeping. There is no fever or chills. Work-up in the ED showed no leukocytosis.       Physical Exam at Discharge:  BP 90/61   Pulse 89   Temp 97.1 °F (36.2 °C) (Oral)   Resp 18   Ht 5' 4\" (1.626 m)   Wt 238 lb 1.6 oz (108 kg)   LMP 10/24/2012   SpO2 99%   BMI 40.87 kg/m²     Gen: No distress. Alert.  Chronically ill-appearing  Eyes: PERRL. No sclera icterus. No conjunctival injection. ENT: No discharge. Pharynx clear. Neck: Trachea midline. Normal thyroid.    Resp: No accessory muscle use. No crackles. No wheezes. No rhonchi. No dullness on percussion. CV: Regular rate. Regular rhythm. No murmur or rub. + edema.   GI: Non-tender. Non-distended. No masses. No organomegaly. Normal bowel sounds. No hernia. Skin: Both legs show multiple open areas with ulcers.  Left toes show mild dry gangrene.  Peripheral pulses are diminished.  Some purulent drainage is seen. Lymph: No cervical LAD. No supraclavicular LAD. M/S: No cyanosis. No joint deformity. No clubbing. Neuro: Moves all 4 extremities  Psych: Oriented x 3. No anxiety or agitation. Hospital Course       Chronic Wounds to BLE- cellulitis ruled out. Chronic LE Swelling  - check PCT, blood cx x2  - got Vanc and Cefepime D#1 stopped after ID saw patient. PRN [place] for pain  - wound care consult, ID consulted     Suspected Osteomyelitis-ruled out after podiatry saw patient.    - XR left foot showed suspected osteomyelitis of the 2nd tuft, chronic erosion of the 1st tuft  -Stopped Vanc and Cefepime after D#1  - Podiatry consulted     AGMA-start sodium bicarbonate per nephrology  ESRD  - recently started on HD  - has right TDC  - HD on T,Th, S  - Nephrology consulted   - try to pull more fluid at HD,     Hypotension  - on Midodrine     Lactic Acidosis  - trended to normal  - no sepsis     Chronic Combined Diastolic and Systolic CHF  Moderate Pulmonary HTN  Ischemic Cardiomyopathy  - appears compensated  - last echo 4/2021: EF 25%, grade III DD  - has not yet f/u with EP for defibrillator  - cont Coreg, statin     CAD s/p CABG/Stents  - no chest pain  - cont Plavix, Lipitor, Coreg     PVD  - f/w Dr. Deepthi Garcia  - cont Plavix, Statin     Hx of PAF  - in NSR  - AC on Eliquis  - cont Amiodarone, Coreg     Type II DM  - uncontrolled  - last Hgb A1c: 7.9 on 7/29  - add lantus 5 units nightly, medium dose SSI  - POC Glucose, carb control diet      COPD  - no AE  - cont Symbicort     GERD  - cont Protonix     Normocytic Anemia  - Hgb 9.2  - stable     HLD  - cont Lipitor     Anxiety  - cont Buspar     CLINT  - not compliant with CPAP    CBC: Recent Labs     09/07/21 1835 09/09/21 0423   WBC 9.6 8.0   HGB 9.2* 9.4*   HCT 29.5* 31.5*   MCV 85.0 91.1    242     BMP:   Recent Labs     09/07/21 1835 09/09/21 0423   * 128*   K 4.2 5.4*   CL 95* 94*   CO2 18* 13*   PHOS  --  8.4*   BUN 33* 47*   CREATININE 3.6* 4.9*     LIVER PROFILE:   Recent Labs     09/07/21 1835   AST 12*   ALT 13   BILITOT 1.8*   ALKPHOS 296*     XR FOOT LEFT (MIN 3 VIEWS)   Final Result   Suspected osteomyelitis of the 2nd tuft. Chronic erosion of the 1st tuft. Cellulitis of the forefoot. Discharge Medications     Medication List      START taking these medications    sodium bicarbonate 650 MG tablet  Take 1 tablet by mouth 3 times daily        CHANGE how you take these medications    apixaban 2.5 MG Tabs tablet  Commonly known as: ELIQUIS  Take 1 tablet by mouth 2 times daily  What changed:   · medication strength  · how much to take     HYDROcodone-acetaminophen 5-325 MG per tablet  Commonly known as: NORCO  Take 1 tablet by mouth every 8 hours as needed for Pain for up to 3 days.   What changed:   · how much to take  · reasons to take this        CONTINUE taking these medications    acetaminophen 650 MG extended release tablet  Commonly known as: TYLENOL     albuterol sulfate  (90 Base) MCG/ACT inhaler     amiodarone 200 MG tablet  Commonly known as: CORDARONE     ARIPiprazole 15 MG tablet  Commonly known as: ABILIFY     atorvastatin 80 MG tablet  Commonly known as: LIPITOR  TAKE 1 TABLET BY MOUTH EVERY DAY AT NIGHT     b complex-C-folic acid 1 MG capsule  Take 1 capsule by mouth daily     B-D ULTRAFINE III SHORT PEN 31G X 8 MM Misc  Generic drug: Insulin Pen Needle  Five shots a day     benztropine 1 MG tablet  Commonly known as: COGENTIN     carvedilol 3.125 MG tablet  Commonly known as: COREG     clopidogrel 75 MG tablet  Commonly known as: PLAVIX  TAKE 1 TABLET BY MOUTH EVERY DAY     doxepin 25 MG capsule  Commonly known as: SINEQUAN     fluticasone-salmeterol 250-50 MCG/DOSE Aepb  Commonly known as: ADVAIR     * FREESTYLE LITE strip  Generic drug: blood glucose test strips  USE TO CHECK BLOOD GLUCOSE LEVELS FOUR TIMES DAILY     * blood glucose test strips strip  Commonly known as: ASCENSIA AUTODISC VI;ONE TOUCH ULTRA TEST VI  USE TO MONITOR BLOOD GLUCOSE LEVELS 4 TIMES DAILY     hydrOXYzine 25 MG capsule  Commonly known as: VISTARIL     Incruse Ellipta 62.5 MCG/INH Aepb  Generic drug: Umeclidinium Bromide     INSULIN SYRINGE .5CC/29G 29G X 1/2\" 0.5 ML Misc  1 each by Does not apply route daily     lamoTRIgine 200 MG tablet  Commonly known as: LAMICTAL     miconazole 2 % powder  Commonly known as: MICOTIN  Apply topically 2 times daily. midodrine 10 MG tablet  Commonly known as: PROAMATINE  Take 1 tablet by mouth 3 times daily (with meals)     nitroGLYCERIN 0.4 MG SL tablet  Commonly known as: NITROSTAT  Place 1 tablet under the tongue every 5 minutes as needed for Chest pain up to max of 3 total doses. If no relief after 1 dose, call 911. ONE TOUCH ULTRA 2 w/Device Kit  USE TO MONITOR BLOOD GLUCOSE LEVELS     ONE TOUCH ULTRASOFT LANCETS Misc  USE TO TEST BLOOD GLUCOSE LEVELS 4 TIMES DAILY     pantoprazole 40 MG tablet  Commonly known as: PROTONIX  Take 1 tablet by mouth daily     silver sulfADIAZINE 1 % cream  Commonly known as: SILVADENE     torsemide 100 MG tablet  Commonly known as: DEMADEX  Take 1 tablet by mouth daily     Tresiba FlexTouch 100 UNIT/ML Sopn  Generic drug: Insulin Degludec  Inject 5 Units into the skin nightly         * This list has 2 medication(s) that are the same as other medications prescribed for you. Read the directions carefully, and ask your doctor or other care provider to review them with you.             STOP taking these medications    busPIRone 30 MG tablet  Commonly known as: BUSPAR     cephALEXin 500 MG capsule  Commonly known as: KEFLEX     metoprolol tartrate 25 MG tablet  Commonly known as: LOPRESSOR           Where to Get Your Medications      You can get these medications from any pharmacy    Bring a paper prescription for each of these medications  · HYDROcodone-acetaminophen 5-325 MG per tablet     Information about where to get these medications is not yet available    Ask your nurse or doctor about these medications  · apixaban 2.5 MG Tabs tablet  · sodium bicarbonate 650 MG tablet           Discharge Condition/Location: Stable    Follow Up: Follow up with PCP.     More than 30 mts spent      [facility], MD 9/10/2021 8:54 AM

## 2021-09-10 NOTE — PROGRESS NOTES
Shift assessment complete, scheduled meds given. Call light and bedside table in reach. Pt denies further needs at this time. Will continue to monitor.

## 2021-09-10 NOTE — PROGRESS NOTES
[facility] Progress Note           Subjective/   60y.o. year old female who we are seeing in consultation for ESRD on HD. HPI:  Last had HD on 9/10 with 4 liters removed, post-weight of 105.7 kg. Denies any shortness of breath. ROS:  Intake reduced, no fevers. Objective/   GEN:  Chronically ill, BP 92/63   Pulse 82   Temp 97.6 °F (36.4 °C) (Axillary)   Resp 18   Ht 5' 4\" (1.626 m)   Wt 238 lb 1.6 oz (108 kg)   LMP 10/24/2012   SpO2 100%   BMI 40.87 kg/m²   HEENT: non-icteric, no JVD  CV: S1, S2 without m/r/g; ++ LE edema with open ulcerations  RESP: CTA B without w/r/r; breathing wnl  ABD: +bs, soft, nt, no hsm  SKIN: warm, no rashes  ACCESS: R [facility]    Data/  Recent Labs     09/07/21  1835 09/09/21  0423   WBC 9.6 8.0   HGB 9.2* 9.4*   HCT 29.5* 31.5*   MCV 85.0 91.1    242     Recent Labs     09/07/21  1835 09/09/21  0423   * 128*   K 4.2 5.4*   CL 95* 94*   CO2 18* 13*   GLUCOSE 291* 201*   PHOS  --  8.4*   BUN 33* 47*   CREATININE 3.6* 4.9*   LABGLOM 13* 9*   GFRAA 16* 11*       Assessment/     - End stage renal disease - on HD Tues-Thurs-Sat     - Chronic wounds/swelling to BLE - plans per Podiatry/ID     - Metabolic acidosis - worsening, lactate wnl now     - CHF - systolic/diastolic - EF 02%, grade 3 diastolic dysfunction     - Hypotension - on scheduled midodrine     - Anemia of chronic disease - Hb 9.4, BELKIS with HD, Epogen 2,400 units qHD    Plan/     - HD tomorrow per schedule with UF as tolerated - .5 kg which she has not been reaching  - Started sodium bicarbonate 650 mg po tid  - Continue scheduled midodrine 10 mg po tid  - BELKIS with HD  - Trend labs, bp's    Case d/w Admitting team  Okay for discharge     ____________________________________  Rhona Hewitt MD  [facility]  [email or web address]  Office: [phone]

## 2021-09-10 NOTE — PROGRESS NOTES
Report called to Lissa at [facility]. Patient to be picked up between [id number] today. Will continue to monitor.

## 2021-09-10 NOTE — PROGRESS NOTES
Patient taken out per transport to [facility] in stable condition at this time.

## 2021-09-10 NOTE — CARE COORDINATION
DISCHARGE ORDER  Date/Time 9/10/2021 10:39 AM  Completed by: Ifeoma Bowers RN, Case Management    Patient Name: Yany Shah    : 1965      Admit order Date and Status:2021  Noted discharge order. (verify MD's last order for status of admission/Traditional Medicare 3 [place] Inpatient qualifying stay required for SNF)    Confirmed discharge plan with:              Patient:  Yes              When pt confirms DC plan does any support person need to be contacted by CM No if yes who______                      Discharge to Facility: [address] [place] [address] phone number for staff giving report: [phone]   Pre-certification completed: yes   Hospital Exemption Notification (HENS) completed: returning to STR-not needed  Discharge orders and Continuity of Care faxed to facility:  yes      Transportation:               Medical Transport explained with choice list offered to pt/family. Choice:Yes(no preference)  Agency used: [facility]   time:   1390-4995      Pt/family/Nursing/Facility aware of  time:   Yes Names: pt to let family know/bedside RN Mara/Lupe @ [facility]  Ambulance form completed:  yes:      Comments:Chart reviewed. Met with pt at bedside she remains agreeable to return to [facility] and is aware of  time. Barrington Ramires @ [facility] aware of  time and requesting rapid covid test, ordered at this time, bedside RN [facility] aware. Pt is being d/c'd to T today. Pt's O2 sats are 100% on RA. Discharge timeout done with [facility]. All discharge needs and concerns addressed. Discharging nurse to complete CARMEN, reconcile AVS, and place final copy with patient's discharge packet. Discharging RN to ensure that written prescriptions for  Level II medications are sent with patient to the facility as per protocol.

## 2021-09-10 NOTE — PROGRESS NOTES
Patient resting in bed, AM assessment completed. Lungs decreased. BS+. INT intact. R chest vas cath intact. Large amount of bruising to chest and arms, scattered. BLE dressings dry and intact. No acute distress noted. Call light in reach. Will continue to angela tor.

## 2021-09-10 NOTE — PROGRESS NOTES
Inpatient Physical Therapy Daily Treatment Note    Unit: PCU  Date:  9/10/2021  Patient Name:    Oracio Bauitsta  Admitting diagnosis:  Cellulitis [L03.90]  Bilateral lower leg cellulitis [E76.892, X17.501]  Severe sepsis ([facility] [address] [A41.9, R65.20]  Admit Date:  9/7/2021  Precautions/Restrictions:   Fall risk, Bed/chair alarm, Lines -IV, Telemetry and bilateral lower legs weeping wounds, buttock pressure sore      Discharge Recommendations: SNF  DME needs for discharge: defer to facility       Therapy recommendation for EMS Transport: can transport by wheelchair    Therapy recommendations for staff:   Assist of 1 with use of rolling walker (RW) and gait belt for all transfers and ambulation to/from [facility]  to/from chair    History of Present Illness: H & P as per Kassi Bustillo MD's note dated 9/7/2021: Tee Flores a 60 y. o. female  who presents to the ED complaining of bilateral lower extremity edema and worsening redness and pain.  Patient states that she has had infection before but never this bad.  She states that her legs are weeping and become more swollen and red and very painful.  She was given Norco at her nursing facility with only mild improvement in pain.  The nurse practitioner at her facility has been providing wound care and wrapping her legs but it has worsened despite that.  No vomiting.  Patient does have a history of end-stage renal disease on dialysis and has not missed any dialysis sessions.  She states that she has peripheral vascular disease and follows with Dr. Ady Sheikh for her vascular care. Home Health S4 Level Recommendation: Level 3 Safety  AM-PAC Mobility Score   AM-PAC Inpatient Mobility Raw Score : 17       Treatment Time: 0986 - 1860  Treatment number: 2  Timed Code Treatment Minutes: 27 minutes  Total Treatment Minutes:  27  minutes    Cognition    A&O x4   Able to follow 2 step commands    Subjective  Patient lying supine in bed with no family present   Pt agreeable to this PT tx. Pain   Yes  Location: lower legs  Rating:    moderate/10  Pain Medicine Status: Pain med requested     Bed Mobility   Supine to Sit:    SBA  Sit to Supine:   CGA  Rolling:   CGA  Scooting:   CGA in supine and SBA in sitting    Transfer Training     Sit to stand:   CGA  Stand to sit:   CGA  Bed to Chair:   CGA with use of gait belt and rolling walker (RW)    Gait Training gait completed as indicated below  Distance:      10 ft  Deviations (firm surface/linoleum):  decreased tamir, increased GABINO, decreased step length bilaterally and decreased stance time bilaterally  Assistive Device Used:    gait belt and rolling walker (RW)  Level of Assist:    CGA  Comment: Patient was limited in walking distance due to moderate level of fatigue. Stair Training deferred, pt unsafe/not appropriate to complete stairs at this time     Therapeutic Exercise all completed bilaterally unless indicated  Ankle Pumps x 20 reps  Heel slides x 10 reps  LAQ x 10 reps  SLR x 10 reps  Clam shells x 10 reps  Hamstring curls in side lying x 10 reps    Balance  Sitting:  Fair +; SBA  Comments:     Standing: Fair -; CGA  Comments: ~3 min    Patient Education      Role of PT, POC, Discharge recommendations, DC recommendations, safety awareness, transfer techniques, pursed lip breathing, energy conservation, pacing activity and calling for assist with mobility. Positioning Needs       Pt in bed, alarm set, positioned in proper neutral alignment and pressure relief provided. Call light provided and all needs within reach    ROM Measurements N/A  Knee Flexion:  Knee Extension:     Activity Tolerance   Pt completed therapy session with SOB noted with standing activities. BP   Sitting 92/60   Standing and ambulation 10 ft 77/52   Supine 90/58       Other  N/A    Assessment :  Patient tolerated the session well.      Recommending SNF upon discharge as patient functioning well below baseline, demonstrates good rehab potential and unable to return home due to limited or no family support, inability to negotiate stairs to enter home/bedroom/bathroom, burden of care beyond caregiver ability, home environment not conducive to patient recovery and limited safety awareness. Goals (all goals ongoing unless otherwise indicated)  To be met in 3 visits:  1). Independent with LE Ex x 10 reps     To be met in 6 visits:  1). Supine to/from sit: CGA  2). Sit to/from stand: CGA  3). Bed to chair: Min A   4). Gait: Ambulate 50 ft.  with  CGA and use of LRAD (least restrictive assistive device)  5). Tolerate B LE exercises 3 sets of 10-15 reps  6). Ascend/descend 7 steps with Min A  with use of hand rail bilateral and LRAD (least restrictive assistive device)       Plan   Continue with plan of care. Signature: Ashley Gonzalez MS PT, # [id number]    If patient discharges from this facility prior to next visit, this note will serve as the Discharge Summary.

## 2021-09-13 NOTE — PROGRESS NOTES
[facility]                                             CHIEF COMPLAINT:  Follow up COPD /CT      HPI:   CT chest reviewed by me and noted below. Results were dicussed with patient and multiple good questions were answered.    Doing pretty good  At [facility] for rehab   No cough or sputum  Still smoking 4 cig/day   Uses Albuterol once a day    Uses Spiriva daily   Ran of of her symbicort- doing okay without it       Past Medical History:   Diagnosis Date    Acute blood loss anemia 08/05/2020    Acute on chronic combined systolic and diastolic congestive heart failure ([address] 01/10/2020    Acute on chronic right-sided heart failure ([address] 08/2020    Alcohol dependence ([address] 03/10/2010    Angina pectoris (HCC)     Arthritis     Asthma     Atrial fibrillation (HCC)     CAD (coronary artery disease)     Cardiomyopathy ([address]     Cellulitis 06/03/2020    Charcot's joint of ankle, left     Closed nondisplaced fracture of navicular bone of left foot     COPD (chronic obstructive pulmonary disease) ([address]     Diabetic ulcer of left foot associated with type 2 diabetes mellitus, limited to breakdown of skin ([address] 01/18/2020    Diabetic ulcer of toe of right foot associated with type 2 diabetes mellitus ([address] 01/18/2020    Enthesopathy     ESRD (end stage renal disease) on dialysis ([address]     Tues-Thurs-Sat    GERD (gastroesophageal reflux disease)     Hyperlipidemia     Hypertension     Idiopathic acute pancreatitis 8/7/2021    Left renal artery stenosis ([address] 08/2020    MI (myocardial infarction) ([address] 10/07/2019    NSTEMI    Morbid obesity ([address]     Osteoarthritis     Peripheral neuropathy     Peripheral vascular disease ([address]     Polyarthritis     Positive FIT (fecal immunochemical test) 02/28/2020    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 steal syndrome 09/01/2020    Traumatic perinephric hematoma, left, initial encounter 08/04/2020       Past Surgical History:        Procedure Laterality Date    ARTERIAL BYPASS SURGRY Left 01/06/2016    Axillary/Subclavian to Brachial Bypass w/reversed SVG    CARDIAC CATHETERIZATION  03/27/2018    Dr. Christina Carpenter - at [address] [place]  01/20/2020    Dr. Jessica Brewersh      pancreatitis post surgery    CORONARY ANGIOPLASTY WITH STENT PLACEMENT  03/16/2018    MELODY- 3.5 x 38 and 3.5 x 20 to Cx    CORONARY ANGIOPLASTY WITH STENT PLACEMENT  01/03/2018    MELODY- 3.0 x 38 and 2.75 x 26 and 2.75 x 8 and 2.75 x 22 to the LAD    CORONARY ANGIOPLASTY WITH STENT PLACEMENT  10/07/2019    MELODY- 2.5 x 8 to [address] GRAFT N/A 01/27/2020    CORONARY ARTERY BYPASS GRAFTING X 1, ON PUMP BEATING HEART, INTERNAL MAMMARY ARTERY TO LEFT ANTERIOR DESCENDING ARTERY, VAS CATH INSERTION, URI, PLATELET GEL APPLICATION, 5 LEVEL BILATERAL INTERCOSTAL NERVE BLOCK, STERNAL PLATING performed by Lizett Peña MD at [address] Right 05/16/2019    fem-pop, performed by Mara Briggs MD at [address] [facility] [place]  02/14/2019    CardioMEMs insertion for CHF [id number]  Serial #[id number]  MRI Conditional    IR NONTUNNELED VASCULAR CATHETER  07/09/2021    IR NONTUNNELED VASCULAR CATHETER 7/9/2021 [facility]    IR TUNNELED CATHETER PLACEMENT GREATER THAN 5 YEARS  7/29/2021    IR TUNNELED CATHETER PLACEMENT GREATER THAN 5 YEARS 7/29/2021 [facility]    ROTATOR CUFF REPAIR Left 04/22/2016    TONSILLECTOMY      TRANSESOPHAGEAL ECHOCARDIOGRAM  01/26/2020    TRANSLUMINAL ANGIOPLASTY Left 10/08/2019    with stenting, at [facility]    TRANSLUMINAL ANGIOPLASTY Left 04/29/2020    of the [facility] re-occlusion    TUMOR EXCISION      benign tumors X 2 chest & axilla    UPPER GASTROINTESTINAL ENDOSCOPY  04/25/2013    BX barretts, HH, gastritis    UPPER GASTROINTESTINAL ENDOSCOPY  12/10/2015    UPPER GASTROINTESTINAL ENDOSCOPY  01/06/2017    Gastritis    VASCULAR SURGERY Left 12/08/2015    upper extremity and mesenteric angiogram (diagnostic only)    VASCULAR SURGERY Bilateral 07/07/2020    diagnostic lower extremity angiogram only    VASCULAR SURGERY Left 08/04/2020    angioplasty and stent of renal artery    VASCULAR SURGERY Left 08/05/2020    coil embolization of branch renal artery vessel for bleeding    VASCULAR SURGERY Left 09/01/2020    angioplasty and stenting of subclavian artery       Allergies:  is allergic to lisinopril. Social History:    TOBACCO:   reports that she has been smoking cigarettes. She has a 30.00 pack-year smoking history. She has never used smokeless tobacco.  ETOH:   reports previous alcohol use. Family History:       Problem Relation Age of Onset    Heart Disease Maternal Grandmother     Cancer Mother         lung and brain cancer    Cancer Maternal Aunt         lung and brain cancer       Current Medications:    Current Outpatient Medications:     HYDROcodone-acetaminophen (NORCO) 5-325 MG per tablet, Take 1 tablet by mouth every 8 hours as needed for Pain for up to 3 days. , Disp: 9 tablet, Rfl: 0    sodium bicarbonate 650 MG tablet, Take 1 tablet by mouth 3 times daily, Disp: 90 tablet, Rfl: 2    apixaban (ELIQUIS) 2.5 MG TABS tablet, Take 1 tablet by mouth 2 times daily, Disp: 60 tablet, Rfl: 5    amiodarone (CORDARONE) 200 MG tablet, Take 200 mg by mouth daily, Disp: , Rfl:     ARIPiprazole (ABILIFY) 15 MG tablet, Take 15 mg by mouth daily, Disp: , Rfl:     carvedilol (COREG) 3.125 MG tablet, Take 3.125 mg by mouth 2 times daily (with meals), Disp: , Rfl:     doxepin (SINEQUAN) 25 MG capsule, Take 25 mg by mouth nightly, Disp: , Rfl:     albuterol sulfate  (90 Base) MCG/ACT inhaler, , Disp: , Rfl:     silver sulfADIAZINE (SILVADENE) 1 % cream, Apply topically daily Apply topically daily. , Disp: , Rfl:     Insulin Degludec (TRESIBA FLEXTOUCH) 100 UNIT/ML SOPN, Inject 5 Units into the skin nightly, Disp: 10 pen, Rfl: 5    hydrOXYzine (VISTARIL) 25 MG capsule, Take 10 mg by mouth 3 times daily as needed for Itching , Disp: , Rfl:     torsemide (DEMADEX) 100 MG tablet, Take 1 tablet by mouth daily, Disp: , Rfl:     b complex-C-folic acid (NEPHROCAPS) 1 MG capsule, Take 1 capsule by mouth daily, Disp: , Rfl:     midodrine (PROAMATINE) 10 MG tablet, Take 1 tablet by mouth 3 times daily (with meals), Disp: , Rfl:     pantoprazole (PROTONIX) 40 MG tablet, Take 1 tablet by mouth daily, Disp: , Rfl:     acetaminophen (TYLENOL) 650 MG extended release tablet, Take 650 mg by mouth every 4 hours as needed for Pain, Disp: , Rfl:     Umeclidinium Bromide (INCRUSE ELLIPTA) 62.5 MCG/INH AEPB, Inhale 1 Dose into the lungs daily , Disp: , Rfl:     fluticasone-salmeterol (ADVAIR) 250-50 MCG/DOSE AEPB, Inhale 1 puff into the lungs 2 times daily , Disp: , Rfl:     miconazole (MICOTIN) 2 % powder, Apply topically 2 times daily. , Disp: 45 g, Rfl: 1    Blood Glucose Monitoring Suppl (ONE TOUCH ULTRA 2) w/Device KIT, USE TO MONITOR BLOOD GLUCOSE LEVELS, Disp: 1 kit, Rfl: 0    ONE TOUCH ULTRASOFT LANCETS MISC, USE TO TEST BLOOD GLUCOSE LEVELS 4 TIMES DAILY, Disp: 150 each, Rfl: 3    blood glucose test strips (ASCENSIA AUTODISC VI;ONE TOUCH ULTRA TEST VI) strip, USE TO MONITOR BLOOD GLUCOSE LEVELS 4 TIMES DAILY, Disp: 150 each, Rfl: 3    nitroGLYCERIN (NITROSTAT) 0.4 MG SL tablet, Place 1 tablet under the tongue every 5 minutes as needed for Chest pain up to max of 3 total doses.  If no relief after 1 dose, call 911., Disp: 25 tablet, Rfl: 0    Insulin Pen Needle (B-D ULTRAFINE III SHORT PEN) 31G X 8 MM MISC, Five shots a day, Disp: 200 each, Rfl: 5    atorvastatin (LIPITOR) 80 MG tablet, TAKE 1 TABLET BY MOUTH EVERY DAY AT NIGHT, Disp: 90 tablet, Rfl: 3    blood glucose test strips (FREESTYLE LITE) strip, USE TO CHECK BLOOD GLUCOSE LEVELS FOUR TIMES DAILY, Disp: 200 strip, Rfl: 10    benztropine (COGENTIN) 1 MG tablet, Take 1 mg by mouth nightly , Disp: , Rfl:     INSULIN SYRINGE .5CC/29G 29G X 1/2\" 0.5 ML MISC, 1 each by Does not apply route daily, Disp: 100 each, Rfl: 3    clopidogrel (PLAVIX) 75 MG tablet, TAKE 1 TABLET BY MOUTH EVERY DAY, Disp: 30 tablet, Rfl: 5    lamoTRIgine (LAMICTAL) 200 MG tablet, Take 200 mg by mouth 2 times daily , Disp: , Rfl:         Objective:   PHYSICAL EXAM:    /72   Pulse 78   Temp 96.7 °F (35.9 °C)   Resp 20   Ht 5' 4\" (1.626 m)   Wt 241 lb (109.3 kg)   LMP 10/24/2012   SpO2 99% Comment: with RA  BMI 41.37 kg/m²   Gen: No distress. Eyes: PERRL. No sclera icterus. No conjunctival injection. ENT: No discharge. Pharynx clear. Neck: Trachea midline. No obvious mass. Resp: No accessory muscle use. No crackles. No wheezes. No rhonchi. No dullness on percussion. Good air entry. CV: Regular rate. Regular rhythm. No murmur or rub. 1-2 + LE edema. GI: Non-tender. Non-distended. No hernia. Skin: Warm and dry. No nodule on exposed extremities. Lymph: No cervical LAD. No supraclavicular LAD. M/S: No cyanosis. No joint deformity. No clubbing. Neuro: Awake. Alert. Moves all four extremities. Psych: Oriented x 3. No anxiety.        DATA reviewed by me:   PFTs 04/29/21 FVC 1.86(55%) FEV1 1.32(50%) FEV1/FVC 71% TLC 3.58(68%) DLCO 11.54(49%) 6MW 440 F low O2 86%   PFTs 12/11/18 FVC 1.63(47%) FEV1 1.18(43%) FEV1/FVC 72% TLC 4.23(79%) DLCO 14.23(59%) 6MW 440 F low O2 86%  PFTs 12/11/18 FVC 1.63(47%) FEV1 1.18(43%) FEV1/FVC 72% TLC 4.23(79%) DLCO 14.23(59%) 6MW 440 F low O2 86%    CT chest 2/24/2020 imaging reviewed by me and showed  New curvilinear opacities left upper lobe left lower lobe likely atelectasis/scarring  Multiple new scattered subcentimeter nodules bilaterally    CT chest 9/11/2020 imaging reviewed by me and showed   Unchanged 3 mm left lower lobe nodule     CT chest 11/30/2020  images reviewed by me and showed: Improving with incompletely imaged small residual perinephric hematoma    CT chest 9/13/2021  images reviewed by me and showed:   Stable CT    There is subtle emphysema with a few stable noncalcified pulmonary nodules  Severe coronary artery calcification      PSG 12/7/18 AHI 23.7 and desaturation to 87%  CPAP titration 12/20/18 CPAP 8 cm H2O      CPAP data 02/04-03/05 2019 reviewed by me. Uses 3-4  hrs/night with 47% compliance and AHI of  0.9. CPAP 8    Assessment:       · Chronic bronchitis/pulmonary emphysema   · Pulmonary nodules - stable on repeat CT chest   · Moderate CLINT. CPAP 8 cm H2O. Declined retrying CPAP and alternative therapy. ONPO 11/6/2019 with no significant desaturation  · Moderate restrictive defect with decreased diffusion capacity- likely due to obesity. No ILD on CT.  · CAD, ischemic cardiomyopathy, mitral valve insufficiency, and chronic systolic heart failure (EF 30-35%). Post CABG 1/27/2020 followed by cardiology   · ESRD on HD - R [facility] HD cathter   · 45 pack year smoking - quit 8/2019       Plan:       D/C Symbicort 160/4.5 2 puffs BID   Spiriva daily and Albuterol 2 puffs Q4-6 hrs PRN- refills today   Advised to continue with smoking cessation  Patient is up to date with Covid, Pneumococcal vaccine, influenza vaccine   CT chest, low dose protocol, screening for lung cancer September 2022. Risks, benefits and alternatives including doing nothing were discussed with patient.   Follow up in 6 months or sooner if needed

## 2021-09-22 PROBLEM — I50.43 CHF (CONGESTIVE HEART FAILURE), NYHA CLASS I, ACUTE ON CHRONIC, COMBINED (HCC): Status: RESOLVED | Noted: 2021-01-01 | Resolved: 2021-01-01

## 2021-09-22 NOTE — PLAN OF CARE
First visit in [facility] for diabetic and venous wounds. Patient seen inpatient by Dr. Noy Hair where he ordered for Pt to see vascular, Dr. Louie Crabtree. Pt reports that she has not made the appointment for follow up with Dr. Louie Crabtree, Pt instructed to do so ASAP. Pt v/u. Follow up in [address] [place] [address] in 1 week As ordered. Pt. Aware to call sooner with any problems or questions/concerns. MD orders/D/C instructions reviewed with patient, all questions answered; copy of instructions given to patient.

## 2021-09-25 RX ORDER — CALCIUM CHLORIDE 100 MG/ML
INJECTION INTRAVENOUS; INTRAVENTRICULAR DAILY PRN
Status: COMPLETED | OUTPATIENT
Start: 2021-01-01 | End: 2021-01-01

## 2021-09-25 RX ORDER — EPINEPHRINE 0.1 MG/ML
SYRINGE (ML) INJECTION DAILY PRN
Status: COMPLETED | OUTPATIENT
Start: 2021-01-01 | End: 2021-01-01

## 2021-09-25 NOTE — ED PROVIDER NOTES
[facility]  eMERGENCY dEPARTMENT eNCOUnter      Pt Name: Randee Ball  MRN: [id number]  Shannongfjenna 1965  Date of evaluation: 9/24/2021  Provider: Henry Saxena MD    CHIEF COMPLAINT       Chief Complaint   Patient presents with    Cardiac Arrest     Witnessed cardiac arrest at [facility] HISTORY OF PRESENT ILLNESS   (Location/Symptom, Timing/Onset, Context/Setting, Quality, Duration, Modifying Factors, Severity)  Note limiting factors. Randee Ball is a 60 y.o. female complicated past medical history including but not limited to end-stage renal disease on dialysis as well as coronary artery disease who presents after a witnessed cardiac arrest at her skilled nursing facility. EMS reports that the patient was outside smoking and staff witnessed her come back inside and then slumped over and become unresponsive. They report when they arrived the patient was unresponsive and pulseless with staff performing CPR. They report that the initial rhythm that they observed was V. tach and that the patient was shocked and given 300 mg of amiodarone however did not regain pulses but rhythm did change from V. tach to PEA. They report they performed 30 minutes of CPR and given 5 doses of epinephrine with continuous CPR and intubated the patient with a 7.0 ET tube with the patient remaining in PEA through the remainder of the resuscitation. Blood sugar was 228. No recent known trauma or infections. HPI    Nursing Notes were reviewed. REVIEW OFSYSTEMS    (2-9 systems for level 4, 10 or more for level 5)     Review of Systems   Unable to perform ROS: Intubated       Except as noted above the remainder of the review of systems was reviewed and negative.        PAST MEDICAL HISTORY     Past Medical History:   Diagnosis Date    Acute blood loss anemia 08/05/2020    Acute on chronic combined systolic and diastolic congestive heart failure ([facility] 01/10/2020    Acute on chronic right-sided heart failure ([facility] 08/2020    Alcohol dependence ([address] 03/10/2010    Angina pectoris (HCC)     Arthritis     Asthma     Atrial fibrillation (HCC)     CAD (coronary artery disease)     Cardiomyopathy ([address]     Cellulitis 06/03/2020    Charcot's joint of ankle, left     Closed nondisplaced fracture of navicular bone of left foot     COPD (chronic obstructive pulmonary disease) ([address]     Diabetic ulcer of left foot associated with type 2 diabetes mellitus, limited to breakdown of skin ([address] 01/18/2020    Diabetic ulcer of toe of right foot associated with type 2 diabetes mellitus ([address] 01/18/2020    Enthesopathy     ESRD (end stage renal disease) on dialysis ([address]     Tues-Thurs-Sat    GERD (gastroesophageal reflux disease)     Hemodialysis patient ([address]     Hyperlipidemia     Hypertension     Idiopathic acute pancreatitis 8/7/2021    Left renal artery stenosis ([address] 08/2020    MI (myocardial infarction) ([address] 10/07/2019    NSTEMI    Morbid obesity ([address]     Osteoarthritis     Peripheral neuropathy     Peripheral vascular disease ([address]     Polyarthritis     Positive FIT (fecal immunochemical test) 02/28/2020    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 steal syndrome 09/01/2020    Traumatic perinephric hematoma, left, initial encounter 08/04/2020         SURGICAL HISTORY       Past Surgical History:   Procedure Laterality Date    ARTERIAL BYPASS SURGRY Left 01/06/2016    Axillary/Subclavian to Brachial Bypass w/reversed SVG    CARDIAC CATHETERIZATION  03/27/2018    Dr. Brannon Sarah - [facility]  01/20/2020    Dr. Marianne Allen      pancreatitis post surgery    COLONOSCOPY      CORONARY ANGIOPLASTY WITH STENT PLACEMENT  03/16/2018    MELODY- 3.5 x 38 and 3.5 x 20 to Cx    CORONARY ANGIOPLASTY WITH STENT PLACEMENT  01/03/2018    MELODY- 3.0 x 38 and 2.75 x 26 and 2.75 x 8 and 2.75 x 22 to the LAD    CORONARY ANGIOPLASTY WITH STENT PLACEMENT  10/07/2019    MELODY- 2.5 x 8 to [address] GRAFT N/A 01/27/2020    CORONARY ARTERY BYPASS GRAFTING X 1, ON PUMP BEATING HEART, INTERNAL MAMMARY ARTERY TO LEFT ANTERIOR DESCENDING ARTERY, VAS CATH INSERTION, URI, PLATELET GEL APPLICATION, 5 LEVEL BILATERAL INTERCOSTAL NERVE BLOCK, STERNAL PLATING performed by Cinthia Tillman MD at [address] Right 05/16/2019    fem-pop, performed by Karina Arrieta MD at [address]  02/14/2019    CardioMEMs insertion for CHF [id number]  Serial #[id number]  MRI Conditional    IR NONTUNNELED VASCULAR CATHETER  07/09/2021    IR NONTUNNELED VASCULAR CATHETER 7/9/2021 [facility] SPECIAL PROCEDURES    IR TUNNELED CATHETER PLACEMENT GREATER THAN 5 YEARS  7/29/2021    IR TUNNELED CATHETER PLACEMENT GREATER THAN 5 YEARS 7/29/2021 [address] SPECIAL PROCEDURES    ROTATOR CUFF REPAIR Left 04/22/2016    TONSILLECTOMY      TRANSESOPHAGEAL ECHOCARDIOGRAM  01/26/2020    TRANSLUMINAL ANGIOPLASTY Left 10/08/2019    with stenting, at [facility]    [address] Left 04/29/2020    of the SFA re-occlusion    TUMOR EXCISION      benign tumors X 2 chest & axilla    UPPER GASTROINTESTINAL ENDOSCOPY  04/25/2013    BX barretts, HH, gastritis    UPPER GASTROINTESTINAL ENDOSCOPY  12/10/2015    UPPER GASTROINTESTINAL ENDOSCOPY  01/06/2017    Gastritis    VASCULAR SURGERY Left 12/08/2015    upper extremity and mesenteric angiogram (diagnostic only)    VASCULAR SURGERY Bilateral 07/07/2020    diagnostic lower extremity angiogram only    VASCULAR SURGERY Left 08/04/2020    angioplasty and stent of renal artery    VASCULAR SURGERY Left 08/05/2020    coil embolization of branch renal artery vessel for bleeding    VASCULAR SURGERY Left 09/01/2020    angioplasty and stenting of subclavian artery         CURRENT MEDICATIONS       Previous Medications    ACETAMINOPHEN (TYLENOL) 650 MG EXTENDED RELEASE TABLET    Take 650 mg Partners: Male   Other Topics Concern    Not on file   Social History Narrative    Not on file     Social Determinants of Health     Financial Resource Strain:     Difficulty of Paying Living Expenses:    Food Insecurity:     Worried About Running Out of Food in the Last Year:     [address] in the Last Year:    Transportation Needs:     Lack of Transportation (Medical):  Lack of Transportation (Non-Medical):    Physical Activity:     Days of Exercise per Week:     Minutes of Exercise per Session:    Stress:     Feeling of Stress :    Social Connections:     Frequency of Communication with Friends and Family:     Frequency of Social Gatherings with Friends and Family:     Attends Christianity Services:     Active Member of Clubs or Organizations:     Attends Club or Organization Meetings:     Marital Status:    Intimate Partner Violence:     Fear of Current or Ex-Partner:     Emotionally Abused:     Physically Abused:     Sexually Abused:          PHYSICAL EXAM    (up to 7 for level 4, 8 or more for level 5)     ED Triage Vitals   BP Temp Temp src Pulse Resp SpO2 Height Weight   -- -- -- -- -- -- -- --       Physical Exam  Constitutional:       Comments: Chronically ill patient with CPR in progress. HENT:      Head:      Comments: No signs of trauma. Mouth/Throat:      Comments: 7.0 ET tube 20 one of the lips. Eyes:      Comments: Pupil 6 mm round and nonreactive to light   Cardiovascular:      Comments: Intact carotid pulses with CPR in progress. No pulses appreciated on pulse checks. Pulmonary:      Comments: Bilateral breath sounds with bagging  Abdominal:      Comments: Distended abdomen with no guarding. Neurological:      Mental Status: She is unresponsive. GCS: GCS eye subscore is 1. GCS verbal subscore is 1. GCS motor subscore is 1. EMERGENCY DEPARTMENT COURSE and DIFFERENTIAL DIAGNOSIS/MDM:   Vitals:   There were no vitals filed for this visit.      MDM  Patient remains in PEA throughout entire ED resuscitation. ACLS protocol performed including patient being given calcium, bicarb, and multiple rounds of epinephrine. ET tube is confirmed to be through the cords with direct visualization with laryngoscope. After 4 rounds of CPR and no change in rhythm bedside ultrasound was used to confirm no organized cardiac activity. Time of death was called at 23:11. Patient's daughter is present in the emergency permit and is informed of the patient passing and this brought to the bedside. All questions answered to the best my ability. Procedures    FINAL IMPRESSION      1. Cardiac arrest [facility])          DISPOSITION/PLAN   DISPOSITION  2021 11:21:15 PM        (Please note that portions of this note were completed with a voice recognition program.  Efforts were made to edit the dictations but occasionally words aremis-transcribed. )    Carlo Henderson MD (electronically signed)  Attending Emergency Physician           Carlo Henderson MD  21

## 2021-09-25 NOTE — PROGRESS NOTES
Outpatient Consultation / H&P    Date of Consultation:  9/24/2021    PCP:  Fausto Pina PA-C     Referring Provider:  Dr. Sharla Pugh     Chief Complaint:   Chief Complaint   Patient presents with    Other     patient is here per Wound clinic, told her to see Dr Abebe Palacios for leg wounds not healing. pamlr        History of Present Illness: We are asked to see this patient in consultation by Dr. Sharla Pugh regarding lower extremity wounds and PVD. Nikole Potts is a 60 y.o. female who has a history of severe bilateral lower extremity edema, PVD, CAD s/p CABG, PVD s/p RLE bypass and LLE interventions. ESRD on HD. She is well known to me- initially treated by Dr Segundo Man with percutaneous interventions, then RLE bypass by myself as well as LLE percutaneous re interventions. LLE bypass has been contemplated many times but medical conditions have complicated matters- at time this was considered it was for claudication symptoms. Currently she has extensive wounds on lower left leg as well as ischemic toe ulcerations. Pain in left leg and foot.       Past Medical History:  Past Medical History:   Diagnosis Date    Acute blood loss anemia 08/05/2020    Acute on chronic combined systolic and diastolic congestive heart failure ([address] 01/10/2020    Acute on chronic right-sided heart failure ([address] 08/2020    Alcohol dependence ([address] 03/10/2010    Angina pectoris (HCC)     Arthritis     Asthma     Atrial fibrillation (HCC)     CAD (coronary artery disease)     Cardiomyopathy ([address]     Cellulitis 06/03/2020    Charcot's joint of ankle, left     Closed nondisplaced fracture of navicular bone of left foot     COPD (chronic obstructive pulmonary disease) ([address]     Diabetic ulcer of left foot associated with type 2 diabetes mellitus, limited to breakdown of skin ([address] 01/18/2020    Diabetic ulcer of toe of right foot associated with type 2 diabetes mellitus ([address] 01/18/2020    Enthesopathy     ESRD (end stage renal disease) on dialysis ([facility]     Tues-Thurs-Sat    GERD (gastroesophageal reflux disease)     Hemodialysis patient ([facility]     Hyperlipidemia     Hypertension     Idiopathic acute pancreatitis 8/7/2021    Left renal artery stenosis ([facility] 08/2020    [facility] 10/07/2019    NSTEMI    Morbid obesity (HCC)     Osteoarthritis     Peripheral neuropathy     Peripheral vascular disease (HCC)     Polyarthritis     Positive FIT (fecal immunochemical test) 02/28/2020    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 steal syndrome 09/01/2020    Traumatic perinephric hematoma, left, initial encounter 08/04/2020       Past Surgical History:  Past Surgical History:   Procedure Laterality Date    ARTERIAL BYPASS SURGRY Left 01/06/2016    Axillary/Subclavian to Brachial Bypass w/reversed SVG    CARDIAC CATHETERIZATION  03/27/2018    Dr. Isaiah Dowling - at [address]  01/20/2020    Dr. Kylie Moses      pancreatitis post surgery    COLONOSCOPY      CORONARY ANGIOPLASTY WITH STENT PLACEMENT  03/16/2018    MELODY- 3.5 x 38 and 3.5 x 20 to Cx    CORONARY ANGIOPLASTY WITH STENT PLACEMENT  01/03/2018    MELODY- 3.0 x 38 and 2.75 x 26 and 2.75 x 8 and 2.75 x 22 to the LAD    CORONARY ANGIOPLASTY WITH STENT PLACEMENT  10/07/2019    MELODY- 2.5 x 8 to mLAD    CORONARY ARTERY BYPASS GRAFT N/A 01/27/2020    CORONARY ARTERY BYPASS GRAFTING X 1, ON PUMP BEATING HEART, INTERNAL MAMMARY ARTERY TO LEFT ANTERIOR DESCENDING ARTERY, VAS CATH INSERTION, URI, PLATELET GEL APPLICATION, 5 LEVEL BILATERAL INTERCOSTAL NERVE BLOCK, STERNAL PLATING performed by Renan Brown MD at [address] [facility] [address] Right 05/16/2019    fem-pop, performed by Noah Vasquez MD at [address]  02/14/2019    CardioMEMs insertion for CHF [id number]  Serial #[id number]  MRI Conditional    IR NONTUNNELED VASCULAR CATHETER  07/09/2021    IR NONTUNNELED VASCULAR CATHETER 7/9/2021 [facility] SPECIAL PROCEDURES    IR TUNNELED CATHETER PLACEMENT GREATER THAN 5 YEARS  7/29/2021    IR TUNNELED CATHETER PLACEMENT GREATER THAN 5 YEARS 7/29/2021 [facility] SPECIAL PROCEDURES    ROTATOR CUFF REPAIR Left 04/22/2016    TONSILLECTOMY      TRANSESOPHAGEAL ECHOCARDIOGRAM  01/26/2020    TRANSLUMINAL ANGIOPLASTY Left 10/08/2019    with stenting, at [facility]    TRANSLUMINAL ANGIOPLASTY Left 04/29/2020    of the [facility] re-occlusion    TUMOR EXCISION      benign tumors X 2 chest & axilla    UPPER GASTROINTESTINAL ENDOSCOPY  04/25/2013    BX barretts, HH, gastritis    UPPER GASTROINTESTINAL ENDOSCOPY  12/10/2015    UPPER GASTROINTESTINAL ENDOSCOPY  01/06/2017    Gastritis    VASCULAR SURGERY Left 12/08/2015    upper extremity and mesenteric angiogram (diagnostic only)    VASCULAR SURGERY Bilateral 07/07/2020    diagnostic lower extremity angiogram only    VASCULAR SURGERY Left 08/04/2020    angioplasty and stent of renal artery    VASCULAR SURGERY Left 08/05/2020    coil embolization of branch renal artery vessel for bleeding    VASCULAR SURGERY Left 09/01/2020    angioplasty and stenting of subclavian artery       Home Medications:   Prior to Admission medications    Medication Sig Start Date End Date Taking? Authorizing Provider   insulin lispro (HUMALOG) 100 UNIT/ML injection vial Inject into the skin 3 times daily (before meals) Per sliding scale   Yes Historical Provider, MD Dwain Carey, 5% PATCH AND REMOVAL Apply to L knee one time daily   Yes Historical Provider, MD   HYDROcodone-acetaminophen (NORCO) 5-325 MG per tablet Take 1 tablet by mouth every 8 hours as needed for Pain.    Yes Historical Provider, MD   ondansetron (ZOFRAN) 4 MG tablet Take 4 mg by mouth every 6 hours as needed for Nausea or Vomiting   Yes Historical Provider, MD   sodium bicarbonate 650 MG tablet Take 1 tablet by mouth 3 times daily 9/10/21  Yes Dl Mayo MD   apixaban (ELIQUIS) 2.5 MG TABS tablet Take 1 tablet by mouth 2 times daily 9/10/21  Yes Baldomero Villagomez MD   amiodarone (CORDARONE) 200 MG tablet Take 200 mg by mouth daily   Yes Historical Provider, MD   ARIPiprazole (ABILIFY) 15 MG tablet Take 15 mg by mouth daily   Yes Historical Provider, MD   carvedilol (COREG) 3.125 MG tablet Take 3.125 mg by mouth 2 times daily (with meals)   Yes Historical Provider, MD   doxepin (SINEQUAN) 25 MG capsule Take 25 mg by mouth nightly   Yes Historical Provider, MD   albuterol sulfate  (90 Base) MCG/ACT inhaler Inhale 2 puffs into the lungs every 4 hours  8/20/21  Yes Historical Provider, MD   silver sulfADIAZINE (SILVADENE) 1 % cream Apply topically 2 times daily Apply topically daily. Yes Historical Provider, MD   Insulin Degludec (TRESIBA FLEXTOUCH) 100 UNIT/ML SOPN Inject 5 Units into the skin nightly 8/13/21  Yes Carmen Wadsworth MD   hydrOXYzine (VISTARIL) 25 MG capsule Take 10 mg by mouth 4 times daily Do not give if lethargic   Yes Historical Provider, MD   torsemide (DEMADEX) 100 MG tablet Take 1 tablet by mouth daily 8/4/21  Yes Tayler Plummer MD   b complex-C-folic acid (NEPHROCAPS) 1 MG capsule Take 1 capsule by mouth daily 8/4/21  Yes Tayler Plummer MD   midodrine (PROAMATINE) 10 MG tablet Take 1 tablet by mouth 3 times daily (with meals) 8/3/21  Yes Tayler Plummer MD   pantoprazole (PROTONIX) 40 MG tablet Take 1 tablet by mouth daily 8/3/21  Yes Tayler Plummer MD   acetaminophen (TYLENOL) 650 MG extended release tablet Take 650 mg by mouth every 6 hours as needed for Pain    Yes Historical Provider, MD   Umeclidinium Bromide (INCRUSE ELLIPTA) 62.5 MCG/INH AEPB Inhale 1 Dose into the lungs daily    Yes Historical Provider, MD   fluticasone-salmeterol (ADVAIR) 250-50 MCG/DOSE AEPB Inhale 1 puff into the lungs 2 times daily    Yes Historical Provider, MD   miconazole (MICOTIN) 2 % powder Apply topically 2 times daily.  7/14/21  Yes Artemio Borrego MD Blood Glucose Monitoring Suppl (ONE TOUCH ULTRA 2) w/Device KIT USE TO MONITOR BLOOD GLUCOSE LEVELS 5/26/21  Yes TRAY Anna CNP   ONE TOUCH ULTRASOFT LANCETS MISC USE TO TEST BLOOD GLUCOSE LEVELS 4 TIMES DAILY 5/26/21  Yes TRAY Anna CNP   blood glucose test strips (ASCENSIA AUTODISC VI;ONE TOUCH ULTRA TEST VI) strip USE TO MONITOR BLOOD GLUCOSE LEVELS 4 TIMES DAILY 5/26/21  Yes TRAY Anna CNP   nitroGLYCERIN (NITROSTAT) 0.4 MG SL tablet Place 1 tablet under the tongue every 5 minutes as needed for Chest pain up to max of 3 total doses.  If no relief after 1 dose, call 911. 2/23/21  Yes TRAY Salazar CNP   Insulin Pen Needle (B-D ULTRAFINE III SHORT PEN) 31G X 8 MM MISC Five shots a day 2/1/21  Yes TRAY Anna CNP   atorvastatin (LIPITOR) 80 MG tablet TAKE 1 TABLET BY MOUTH EVERY DAY AT NIGHT 1/11/21  Yes TRAY Salazar CNP   blood glucose test strips (FREESTYLE LITE) strip USE TO CHECK BLOOD GLUCOSE LEVELS FOUR TIMES DAILY 11/17/20  Yes Natalie Bravo MD   benztropine (COGENTIN) 1 MG tablet Take 1 mg by mouth nightly  12/13/19  Yes Historical Provider, MD   INSULIN SYRINGE .5CC/29G 29G X 1/2\" 0.5 ML MISC 1 each by Does not apply route daily 12/3/19  Yes TRAY Anna CNP   clopidogrel (PLAVIX) 75 MG tablet TAKE 1 TABLET BY MOUTH EVERY DAY 9/3/19  Yes TRAY Live CNP   lamoTRIgine (LAMICTAL) 200 MG tablet Take 200 mg by mouth 2 times daily    Yes Historical Provider, MD        Allergies:  Lisinopril      Social History:      Social History     Socioeconomic History    Marital status: Single     Spouse name: Not on file    Number of children: Not on file    Years of education: Not on file    Highest education level: Not on file   Occupational History    Not on file   Tobacco Use    Smoking status: Current Every Day Smoker     Packs/day: 0.50     Years: 30.00     Pack years: 15.00     Types: Cigarettes    Smokeless tobacco: Never Used   Vaping Use    Vaping Use: Never used   Substance and Sexual Activity    Alcohol use: Not Currently     Comment: quit 2006     Drug use: Never    Sexual activity: Yes     Partners: Male   Other Topics Concern    Not on file   Social History Narrative    Not on file     Social Determinants of Health     Financial Resource Strain:     Difficulty of Paying Living Expenses:    Food Insecurity:     Worried About Running Out of Food in the Last Year:     [address] in the Last Year:    Transportation Needs:     Lack of Transportation (Medical):  Lack of Transportation (Non-Medical):    Physical Activity:     Days of Exercise per Week:     Minutes of Exercise per Session:    Stress:     Feeling of Stress :    Social Connections:     Frequency of Communication with Friends and Family:     Frequency of Social Gatherings with Friends and Family:     Attends Restorationist Services:     Active Member of Clubs or Organizations:     Attends Club or Organization Meetings:     Marital Status:    Intimate Partner Violence:     Fear of Current or Ex-Partner:     Emotionally Abused:     Physically Abused:     Sexually Abused:        Family History:        Problem Relation Age of Onset    Heart Disease Maternal Grandmother     Cancer Mother         lung and brain cancer    Cancer Maternal Aunt         lung and brain cancer       Review of Systems:  A 14 point review of systems was completed. Pertinent positives identified in the HPI, all other review of systems negative. Physical Examination:    BP (!) 140/60 (Site: Left Upper Arm)   Ht 5' 4\" (1.626 m)   Wt 217 lb (98.4 kg)   LMP 10/24/2012   BMI 37.25 kg/m²     Weight: 217 lb (98.4 kg)       General appearance: NAD  Eyes: PERRLA  Neck: no JVD, no lymphadenopathy. Respiratory: effort is labored  Cardiovascular: regular, no murmur.   Pulses:    femoral DP PT   LEFT 2 doppler doppler   GI: abdomen soft, nondistended, no organomegaly. Musculoskeletal: strength and tone normal.  Extremities: Severe bilateral lower extremity edema. Extensive superficial ulcerations left lower leg. Ischemic toe ulcer L [place]. MEDICAL DECISION MAKING/TESTING    Arterial Duplex 6/2021:       Impressions   Right Impression   The right ankle/brachial index is 0.9 (the DP is 90 and the PT is 100 mmHg). The common femoral artery demonstrates multiphasic flow indicating no   significant aortic-iliac inflow disease. There is a patent proximal SFA - popliteal bypass graft. However, due to body   habitus and depth of vessels, graft was difficult to image. There is abnormal monophasic flow demonstrated in the posterior tibial artery,   anterior tibial artery and peroneal artery. There is atherosclerotic plaque seen within the common femoral artery,   popliteal artery and calf arteries consistent with <50% stenosis. Left Impression   The left ankle/brachial index is 0.6 (the DP is 60 and the PT is non-audible   mmHg). The common femoral artery demonstrates abnormal monophasic flow indicating   significant aortic-iliac inflow disease. The proximal - distal SFA appears occluded with a non functioning stent. There is abnormal monophasic flow demonstrated in the popliteal artery,   posterior tibial artery and anterior tibial artery. The peroneal artery appears occluded. There is atherosclerotic plaque seen within the common femoral artery, prox -   distal SFA, popliteal artery and calf arteries. Previous exam on 06/26/2020, right ZACH: 0.9 left ZACH: 0.8.       Conclusions        Summary        Normal right ZACH.    Abnormal left ZACH in the range of moderate arterial insufficiency.        Patent right femoropopliteal bypass with distal graft velocity below the    threshold associated with threatened graft patency. No identified graft    abnormality.        Occluded L SFA associated with stent occlusion.        Assessment:      Diagnosis Orders   1. Ulcer of left lower leg, limited to breakdown of skin ([address]     2. Lymphedema of both lower extremities     3. PAD (peripheral artery disease) ([address]     4. Peripheral venous insufficiency       Ulcerations multifactorial due to above conditions. Edema control paramount to healing, however arterial disease also a major contributor. Unfortunately, given medical co morbidities surgical therapy would most likely be poorly tolerated. Recommendations/Plan: Aortogram with LLE angiography. Percutaneous intervention to extent possible. I have discussed all risks (including but not limited to bleeding, thrombosis, contrast allergy, and early and late failure of intervention), benefits and alternatives of catheter-based angiography. Patient freely consents and is eager to proceed. All questions and expectations addressed.       Janell Hua MD, FACS

## 2021-09-26 PROBLEM — L97.529 DIABETIC ULCER OF LEFT GREAT TOE (HCC): Status: ACTIVE | Noted: 2020-01-01

## 2021-09-26 PROBLEM — E11.621 DIABETIC ULCER OF LEFT GREAT TOE (HCC): Status: ACTIVE | Noted: 2020-01-01

## 2021-09-26 NOTE — PROGRESS NOTES
[facility] Progress Note    Daniel Bautista     : 1965    DATE OF VISIT:  2021    Subjective:     Alejandra Paez is a 60 y.o. female who has an arterial,  venous and  lymphedema ulcer located on the bilateral lower leg. Current complaint of pain in this ulcer? yes. Quality of pain: aching, burning, sharp and throbbing  Timing: intermittent and stable  Severity: mild-moderate  Associated Signs/Symptoms:  swelling, redness and drainage (moderate, clear)  Other significant symptoms or pertinent ulcer history: no F/C/D since she was last here, no delayed side effects from recent brief Abx. Has not yet scheduled FU with Dr. Gisselle Black. Doing ok with HD. Seems like dressing changes at the nursing home are doing ok. Additional ulcer(s) noted? yes. Diabetic left toe ulcers.     Ms. Teofilo Lawson has a past medical history of Acute blood loss anemia, Acute on chronic combined systolic and diastolic congestive heart failure (HCC), Acute on chronic right-sided heart failure ([address] Alcohol dependence ([address] Angina pectoris ([address] Arthritis, Asthma, Atrial fibrillation ([address] CAD (coronary artery disease), Cardiomyopathy ([address] Cellulitis, Charcot's joint of ankle, left, Closed nondisplaced fracture of navicular bone of left foot, COPD (chronic obstructive pulmonary disease) ([address] Diabetic ulcer of left foot associated with type 2 diabetes mellitus, limited to breakdown of skin ([address] Diabetic ulcer of toe of right foot associated with type 2 diabetes mellitus ([address] Enthesopathy, ESRD (end stage renal disease) on dialysis ([address] GERD (gastroesophageal reflux disease), Hemodialysis patient ([address] Hyperlipidemia, Hypertension, Idiopathic acute pancreatitis, Left renal artery stenosis ([address] MI (myocardial infarction) ([address] Morbid obesity ([address] Osteoarthritis, Peripheral neuropathy, Peripheral vascular disease ([address] Polyarthritis, Positive FIT (fecal immunochemical test), 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 She has a past surgical history that includes Tonsillectomy; Cholecystectomy; tumor excision; Upper gastrointestinal endoscopy (04/25/2013); Upper gastrointestinal endoscopy (12/10/2015); Upper gastrointestinal endoscopy (01/06/2017); Arterial bypass surgry (Left, 01/06/2016); Cardiac catheterization (03/27/2018); Coronary angioplasty with stent (03/16/2018); Rotator cuff repair (Left, 04/22/2016); Coronary angioplasty with stent (01/03/2018); Insertable Cardiac Monitor (02/14/2019); femoral bypass (Right, 05/16/2019); transluminal angioplasty (Left, 10/08/2019); Cardiac catheterization (01/20/2020); Coronary artery bypass graft (N/A, 01/27/2020); vascular surgery (Left, 12/08/2015); transluminal angioplasty (Left, 04/29/2020); vascular surgery (Bilateral, 07/07/2020); Coronary angioplasty with stent (10/07/2019); transesophageal echocardiogram (01/26/2020); vascular surgery (Left, 08/04/2020); vascular surgery (Left, 08/05/2020); vascular surgery (Left, 09/01/2020); IR NONTUNNELED VASCULAR CATHETER > 5 YEARS (07/09/2021); IR TUNNELED CVC PLACE WO SQ PORT/PUMP > 5 YEARS (7/29/2021); and Colonoscopy. Her family history includes Cancer in her maternal aunt and mother; Heart Disease in her maternal grandmother. Ms. Neo Peralta reports that she has been smoking cigarettes. She has a 15.00 pack-year smoking history. She has never used smokeless tobacco. She reports previous alcohol use. She reports that she does not use drugs.     Her current medication list consists of ARIPiprazole, HYDROcodone-acetaminophen, INSULIN SYRINGE .5CC/29G, Insulin Degludec, Insulin Pen Needle, LIDOCAINE (LIDODERM) 5% PATCH AND REMOVAL, ONE TOUCH ULTRA 2, ONE TOUCH ULTRASOFT LANCETS, Umeclidinium Bromide, acetaminophen, albuterol sulfate HFA, amiodarone, apixaban, atorvastatin, b complex-C-folic acid, benztropine, blood glucose test strips, carvedilol, clopidogrel, doxepin, fluticasone-salmeterol, hydrOXYzine, insulin lispro, lamoTRIgine, miconazole, midodrine, nitroGLYCERIN, ondansetron, pantoprazole, silver sulfADIAZINE, sodium bicarbonate, and torsemide. Allergies: Lisinopril    Pertinent items from the review of systems are discussed in the HPI; the remainder of the ROS was reviewed and is negative. Objective:     Vitals:    09/22/21 0811   BP: 87/64   Pulse: 92   Resp: 22   Temp: 96.8 °F (36 °C)   TempSrc: Oral   SpO2: 99%   Weight: 221 lb 3.2 oz (100.3 kg)   Height: 5' 4\" (1.626 m)       Constitutional:  well-developed, well-nourished, overweight, chronically ill appearing, weak and fatigued, NAD  Psychiatric:  oriented to person, place and time; mood and affect appropriate for the situation   Eyes:  pupils equal, round and reactive to light; sclerae anicteric, conjunctivae pale  Cardiovascular:  bilateral pedal pulses weakly Dopplerable, right leg relatively warm, left leg rather cool, I think worse than 1-2 weeks ago; moderate BL lower extremity lymphedema  Lymphatic:  no inguinal or popliteal adenopathy, no angitis or cellulitis  Musculoskeletal:  no clubbing, cyanosis or petechiae; RLE and LLE with no gross effusions, joint misalignment or acute arthritis  Shaye-ulcer skin: indurated, pink, erythematous  and mostly dry-hyperkeratotic-mild stasis changes on the right side, erythematous but moist and cool on the left leg, mild hyperkeratosis on the left toes. Ulcer(s): right leg cluster partial thickness, smaller, pink-red, a bit of new granulation, fibrin, biofilm; left great toe ulcer eschar, a bit larger, minor moisture; left 3rd toe ulcers small and superficial, I did not probe aggressively; left leg cluster larger, partly partial thickness dermal exposure, some fibrin, biofilm, small areas of moist eschar, no pus, a good deal of serous exudate. Photos also saved in electronic chart.     Today's Wound Measurements, per RN documentation:  Wound 09/22/21 #9, left leg circumferential cluster, venous, full thickness, onset 8/2021-Wound Length (cm): 24 cm  Wound 09/22/21 #10, left great toe, diabetic, godwin 1, onset 8/2021-Wound Length (cm): 1.5 cm  Wound 09/22/21 #11, left 3rd toe tip, diabetic, godwin 1, onset 8/2021-Wound Length (cm): 0.4 cm  Wound 09/22/21 #12, left dorsal 3rd toe, diabetic, godwin 1, onset 8/2021-Wound Length (cm): 0.3 cm  Wound 09/22/21 #13, right pretib cluster, venous, full thickness, onset 8/2021-Wound Length (cm): 7 cm    Wound 09/22/21 #9, left leg circumferential cluster, venous, full thickness, onset 8/2021-Wound Width (cm): 23 cm  Wound 09/22/21 #10, left great toe, diabetic, godwin 1, onset 8/2021-Wound Width (cm): 1.3 cm  Wound 09/22/21 #11, left 3rd toe tip, diabetic, godwin 1, onset 8/2021-Wound Width (cm): 0.3 cm  Wound 09/22/21 #12, left dorsal 3rd toe, diabetic, godwin 1, onset 8/2021-Wound Width (cm): 0.3 cm  Wound 09/22/21 #13, right pretib cluster, venous, full thickness, onset 8/2021-Wound Width (cm): 4 cm    Wound 09/22/21 #9, left leg circumferential cluster, venous, full thickness, onset 8/2021-Wound Depth (cm): 0.2 cm  Wound 09/22/21 #10, left great toe, diabetic, godwin 1, onset 8/2021-Wound Depth (cm): 0.2 cm  Wound 09/22/21 #11, left 3rd toe tip, diabetic, godwin 1, onset 8/2021-Wound Depth (cm): 0.1 cm  Wound 09/22/21 #12, left dorsal 3rd toe, diabetic, godwin 1, onset 8/2021-Wound Depth (cm): 0.1 cm  Wound 09/22/21 #13, right pretib cluster, venous, full thickness, onset 8/2021-Wound Depth (cm): 0.1 cm  _____________________________    Lab Results   Component Value Date    LABALBU 3.4 09/09/2021     Lab Results   Component Value Date    CREATININE 4.9 (H) 09/09/2021     Lab Results   Component Value Date    HGB 9.4 (L) 09/09/2021     Lab Results   Component Value Date    LABA1C 7.9 07/29/2021     Assessment:     Patient Active Problem List   Diagnosis Code    Type 2 diabetes mellitus with diabetic polyneuropathy, with long-term current use of insulin ([facility]) E11.42, Z79.4    Essential hypertension I10    CLINT (obstructive sleep apnea) G47.33    Tobacco abuse disorder Z72.0    GERD (gastroesophageal reflux disease) K21.9    Mixed hyperlipidemia E78.2    Anxiety and depression F41.9, F32.9    Iron deficiency anemia D50.9    CAD (coronary artery disease) I25.10    Mitral valve regurgitation I34.0    COPD (chronic obstructive pulmonary disease) ([facility]) J44.9    Vitamin D deficiency E55.9    Dyslipidemia E78.5    Abel's esophagus K22.70    Viral hepatitis C B19.20    Pulmonary nodule R91.1    Class 2 severe obesity due to excess calories with serious comorbidity and body mass index (BMI) of 39.0 to 39.9 in adult ([facility]) E66.01, Z68.39    Bipolar disorder ([facility]) F31.9    Pulmonary hypertension ([facility]) I27.20    Lymphedema of both lower extremities I89.0    Habitual self-excoriation F42.4    Non-pressure chronic ulcer right lower leg, limited to breakdown skin ([facility]) L97.911    Arthritis M19.90    Cardiomyopathy ([facility]) I42.9    Chronic systolic congestive heart failure ([facility]) I50.22    Peripheral venous insufficiency I87.2    PAD (peripheral artery disease) ([facility]) I73.9    Morbid obesity with BMI of 40.0-44.9, adult ([facility]) E66.01, Z68.41    Ischemic heart disease I25.9    Moderate protein-calorie malnutrition ([facility]) E44.0    Diabetic ulcer of left great toe ([facility]) E11.621, L97.529    Generalized weakness R53.1    Acute kidney injury superimposed on CKD ([facility]) N17.9, N18.9    ESRD (end stage renal disease) on dialysis ([facility]) N18.6, Z99.2    Ulcer of left lower leg, limited to breakdown of skin ([facility] L97.921       Assessment of today's active condition(s): multiple nonhealing lower extremity ulcers related to a number of chronic underlying conditions (DM, PAD, venous stasis, lymphedema, and I think prior left toe trauma or friction injury); no clear signs of acute infection today; could have a chronic osteo in 1 or 2 left toes, but not a priority that we can fix today. Biggest issue is ischemia, especially on the left side. Right side might actually do ok with careful debridement, simple local care and mild compression. Factors contributing to occurrence and/or persistence of the chronic ulcer include venous stasis, lymphedema, diabetes, decreased mobility, obesity, arterial insufficiency and decreased tissue oxygenation. Medical necessity of today's visit is shown by the above documentation. Sharp debridement is indicated today, based upon the exam findings in the ulcer(s) above. Procedure note:     Consent obtained. Time out performed per [facility] policy. Anesthetic  Anesthetic: 4% Lidocaine Cream     Using a curette, I sharply debrided the right lower leg ulcer(s) down through and including the removal of dermis. The type(s) of tissue debrided included fibrin and biofilm. Total Surface Area Debrided: about 15 sq cm. The ulcers were then irrigated with normal saline solution. The procedure was completed with a small amount of bleeding, and hemostasis was with pressure. The patient tolerated the procedure well, with no significant complications. The patient's level of pain during and after the procedure was monitored. Post-debridement measurements, if different from pre-debridement, are in the flowsheet as well.      Discharge plan:     Treatment in the wound care center today, per RN documentation: Wound 09/22/21 #9, left leg circumferential cluster, venous, full thickness, onset 8/2021-Dressing/Treatment: Other (comment) (Triad, Opticel Ag, ABd's, Kerlix, Ace)  Wound 09/22/21 #10, left great toe, diabetic, godwin 1, onset 8/2021-Dressing/Treatment: Other (comment) (betadine, 2x2's, satya)  Wound 09/22/21 #11, left 3rd toe tip, diabetic, godwin 1, onset 8/2021-Dressing/Treatment: Other (comment) (betadine, 2x2's satya)  Wound 09/22/21 #12, left dorsal 3rd toe, diabetic, godwin 1, onset 8/2021-Dressing/Treatment: Other (comment) (Betadine, 2x2's, satya)  Wound 09/22/21 #13, right pretib cluster, venous, full thickness, onset 8/2021-Dressing/Treatment: Other (comment) (calmoseptine opticel ag ABD Coban 2 lite). Per physician order, right application of multilayer compression wrap was performed in the wound care center today, to help manage edema, stasis dermatitis, and/or venous ulcers. Leave primary dressing and multi-layer wrap(s) in place until the next appointment. Also discussed ways to keep the wrap dry for a shower, including a plastic cast-guard, available in retail pharmacies. She should call before her next visit if there is any significant pain, significant strike-through of drainage to the surface of the wrap, or any significant sense of the wrap sliding down more than an inch or so, bunching-up and abrading her skin. I reminded the patient of the importance of weight management and smoking cessation, if applicable; also encouraged ambulation as tolerated, additional lower extremity exercises as instructed in our education sheet, leg elevation when at rest, and compliance with any recommended dietary, diuretic and compression therapies. Accommodative shoe to offload the left toes; she is not taking many steps at all. Urgent FU with Dr. Allison Colorado to discuss her PAD, and hopefully at least an attempt at endovascular therapy in her LLE. No Abx needed at this time. Home treatment: good handwashing before and after any dressing changes. Cleanse ulcer with saline or soap & water before dressing change. May use Vaseline (petrolatum), Aquaphor, Aveeno, CeraVe, Cetaphil, Eucerin, Lubriderm, etc for dry skin. Dressing type for home: as above for the left lower extremity, once daily. Written discharge instructions given to patient.  Follow up in 1 week, unless an evaluation or procedure with Dr. Allison Colorado is scheduled at the same time, in which case that takes precedence.     Electronically signed by Meron Rick MD on 9/26/2021 at 2:50 PM.

## 2021-09-29 ENCOUNTER — HOSPITAL ENCOUNTER (OUTPATIENT)
Dept: WOUND CARE | Age: 56
Discharge: HOME | End: 2021-09-29
Payer: MEDICARE

## 2021-11-19 NOTE — PROGRESS NOTES
Danyel   Daily Progress Note    Admit Date:  3/20/2020  HPI:   Presented with hypotension and weight gain. Found to have SHAUNNA on CKD as well as anasarca. Subjective:  Ms. Yudy Grant awake, alert. Breathing a bit better. Still with tight edema to abdomen/ flank. Legs softer. Objective:   /86   Pulse 93   Temp 98.1 °F (36.7 °C) (Oral)   Resp 18   Ht 5' 4.5\" (1.638 m)   Wt 260 lb 9.6 oz (118.2 kg)   LMP 10/24/2012   SpO2 98%   BMI 44.04 kg/m²       Intake/Output Summary (Last 24 hours) at 3/30/2020 0931  Last data filed at 3/30/2020 0545  Gross per 24 hour   Intake 1270.83 ml   Output 3425 ml   Net -2154.17 ml     Wt Readings from Last 3 Encounters:   03/30/20 260 lb 9.6 oz (118.2 kg)   03/20/20 269 lb (122 kg)   03/17/20 257 lb (116.6 kg)               ASSESSMENT:   1. Hypotension - improved/stable on midodrine 10 mg tid  2. CHF, acute on chronic systolic - still with anasarca, weight down 2 lbs overnight/ 16 lbs total , 3400 ml UOP/ 24 hrs, net neg 22 liters  3. Cardiomyopathy, ischemic - EF 25%, no ACEi/ ARB/ ARNI or BB due to hypotension and SHAUNNA/ CKD  4. CAD - s/p prior PCI's then CABG Jan 2020  5. Acute kidney injury on chronic kidney disease - stable/ improved with diuresis  6. Hypokalemia - 3.7, continue replacement 40 meq bid  7. Mitral regurg  8. Sternal wound infection - augmentin per CT surgery  9. CLINT  10. DM    PAD has decreased from 30-34 down to 28-29 mmHg. Her baseline PAD is felt to be ~ 25. This is encouraging that she is mobilizing fluids and not becoming intravascularly contracted. I favor continued slow diuresis. Though her prior dry weight was considered 245 lbs, I think our target here should be ~ 250 lbs then consider what her po diuretic regimen should be. PLAN:  1. Continue Lasix infusion at 5 mg/hr - consider increasing to 7 mg/hr  2. Monitor K and replace   3. Continue midodrine 10 mg tid  4.  Daily labs, weights, strict I/O      Ora Cousin, TRAY - CNP, 3/30/2020, 9:31 AM  [address]   [phone]       Telemetry: SR 80-90  NYHA: IV    Physical Exam:  General:  Awake, alert, NAD  Skin:  Warm and dry  Neck:  JVP just below jaw line   Chest:  Clear to auscultation   Cardiovascular:  RRR, normal S1S2, no m/g/r  Abdomen:  Semifirm, + pitting lower and lateral abdomen, nontender, +bowel sounds  Extremities:  2+ BLE edema        Medications:    potassium chloride  40 mEq Oral BID WC    miconazole   Topical BID    polyethylene glycol  17 g Oral Daily    ARIPiprazole  10 mg Oral Daily    aspirin EC  81 mg Oral Daily    atorvastatin  80 mg Oral Nightly    benztropine  1 mg Oral Nightly    buprenorphine-naloxone  1 Film Sublingual BID    busPIRone  30 mg Oral BID    clopidogrel  75 mg Oral Daily    lamoTRIgine  150 mg Oral BID    pantoprazole  40 mg Oral BID    tiotropium  2 puff Inhalation Daily    sodium chloride flush  10 mL Intravenous 2 times per day    insulin lispro  8 Units Subcutaneous TID WC    insulin lispro  0-12 Units Subcutaneous TID WC    insulin lispro  0-6 Units Subcutaneous Nightly    heparin (porcine)  5,000 Units Subcutaneous 3 times per day    midodrine  10 mg Oral TID WC      furosemide (LASIX) 1mg/ml infusion 5 mg/hr (03/29/20 1536)    dextrose         Lab Data: Lab results independently reviewed by myself 3/27/20   CBC:   Recent Labs     03/28/20  0451 03/29/20  0450 03/30/20  0410   WBC 7.9 7.3 7.8   HGB 9.1* 9.2* 9.1*    332 354     BMP:    Recent Labs     03/28/20  0451 03/29/20  0450 03/30/20  0410   * 138 138   K 3.9 3.3* 3.7   CO2 27 28 27   BUN 43* 42* 42*   CREATININE 1.4* 1.4* 1.3*     INR:  No results for input(s): INR in the last 72 hours. BNP:    Recent Labs     03/28/20  0907   PROBNP 3,052*     Cardiac Enzymes: No results for input(s): TROPONINI in the last 72 hours.   Lipids:   Lab Results   Component Value Date    TRIG 78 01/27/2020    TRIG 140 01/11/2020    HDL 21 01/27/2020 HDL 27 01/11/2020    LDLCALC 21 01/27/2020    LDLCALC 103 01/11/2020    LDLDIRECT 187 08/01/2017       Cardiac Imaging:      Surgery: 1/27/20 Urgent coronary artery bypass grafting surgery x1 with pedicled left internal mammary artery to the LAD. Cardiopulmonary bypass with on-pump beating-heart technique, no cross-clamp. Transesophageal echo. Epiaortic ultrasound. [place] catheter placement. Doppler verification of grafts. Bilateral five-level intercostal nerve block with Exparel. Platelet gel application. Sternal plating. Vas-Cath placement. URI 1/24/2020:  Ejection fraction is visually estimated at 35-40%.   Moderate to severe mitral regurgitation with multiple jets visualized.   ERO estimated at 0.29 cm2.   Mild tricuspid regurgitation.   The left atrial appendage shows evidence of thrombus formation and low flow velocities.  Moderate amount of plaque in the aorta.     Echo 1/22/2020:   Technically difficult examination secondary to habitus.   The left ventricular systolic function is severely reduced with an ejection fraction of 25 %.   There is severe hypokinesis of the anterior and apical walls consistent with infarction within the territory of the left anterior descending artery.   Grade II diastolic dysfunction with elevated left ventricular filling pressure.   Moderate to severe mitral regurgitation.   Mild tricuspid regurgitation. PASP is estimated at 53 mmHg consistent with moderate pulmonary hypertension  (Right atrial pressure of 3 mmHg).   If clinically necessary, consider URI for better evaluation of mitral    CARDIAC CATH 1/20/2020:   Left Heart Cath  Dominance : Right   LM: 70-80% short distal stenosis   LAD: 70-80% short ostial stenosis, extending into takeoff of high first diagonal; large proximal to mid stented segment patent with jailed second diagonal  (80% ostial D2) and 70% in stent restenosis of most distal stent; distal to near apical LAD with minimal disease   LCx: 70-80% short ostial stenosis, prior to patent proximal to mid stents    RCA: large, dominant vessel with long 60% proximal to mid stenosis    LVEDP: 4 mmHG  LVG not done due to CKD.     Impression/Recommendations:  Diabetic with previous LAD and LCx stenting in 2018 returns with unstable angina, in stent restenosis and Left Main bifurcation disease. Recommend CTS evaluation inpatient to discuss surgical revascularization option. Hold Clopidogrel. BLE ZACH's 4/18/19:   1. There is severe arterial insufficiency at rest involving the right lower    extremity. There occlusive disease of the right proximal superficial femoral    (noted stent) and mid posterior tibial arteries. There is a 50-74% stenosis    at 4the right deep femoral artery with evidence of inflow disease.    2. There is severe arterial insufficiency at rest involving the left lower    extremity. There is occlusive disease of the left proximal superficial    femoral artery (noted stent). There is a 30-49% stenosis at the left deep    femoral artery with evidence of inflow disease.         ECHO 4/3/19:   Emily Shiver systolic function is mildly reduced with EF estimated at 40-45%.   There is severe HK of the apex and apical-septal segment.   Normal left ventricular diastolic filling pressure.   Mild mitral regurgitation.   Systolic pulmonary artery pressure (SPAP) estimated at 32mmHg (RA pressure 3mmHg). n/a

## 2022-01-10 NOTE — PROGRESS NOTES
Addended by: REMIGIO LEA on: 1/10/2022 09:09 AM     Modules accepted: Orders     Pt noted to be 78% on RA. Placed back on 2L NC at this time. Will continue to monitor.

## 2022-05-17 NOTE — PROGRESS NOTES
Perfect serve sent to Dr. Jorge Saucedo:    8/5/20 3:50 AM   \"pt complaining of nausea, no PRN nausea medication ordered. BP becoming very soft SBP in low 80's and 70's. Please advise, thanks. \" Pt ambulated into er. Pt reports upper generalized abdominal pain onset 6 days. Pt denies any other associated symptoms such as NVD or constipation.

## 2022-09-14 NOTE — PROGRESS NOTES
Department of Internal Medicine  Nephrology Progress Note        SUBJECTIVE:    We are following this patient for CKD management. The patient was seen and examined; she feels well today with no CP, SOB, nausea or vomiting. ROS: No fever or chills. Social: No family at bedside. Physical Exam:    VITALS:  /67   Pulse 77   Temp 97.4 °F (36.3 °C) (Oral)   Resp 18   Ht 5' 4\" (1.626 m)   Wt 275 lb 2.2 oz (124.8 kg)   LMP 10/24/2012   SpO2 97%   BMI 47.23 kg/m²     General appearance: Seems comfortable, no acute distress. Eyes: Conjunctivae pink, reactive pupils. Neck: Trachea midline, thyroid normal.   Lungs:  Non labored breathing, CTA to anterior auscultation. Heart:  S1S2 normal, rub or gallop. ++ peripheral edema. Abdomen: Soft, non-tender, no organomegaly. Skin: No lesions or rashes, warm to touch.        DATA:    CBC:   Lab Results   Component Value Date    WBC 7.8 07/05/2021    RBC 4.09 07/05/2021    HGB 11.7 07/05/2021    HCT 35.6 07/05/2021    MCV 86.9 07/05/2021    MCH 28.5 07/05/2021    MCHC 32.8 07/05/2021    RDW 16.9 07/05/2021     07/05/2021    MPV 8.8 07/05/2021     BMP:    Lab Results   Component Value Date     07/06/2021    K 2.9 07/06/2021    K 3.0 06/29/2021    CL 91 07/06/2021    CO2 28 07/06/2021    BUN 89 07/06/2021    LABALBU 3.7 07/06/2021    CREATININE 2.4 07/06/2021    CALCIUM 9.4 07/06/2021    GFRAA 25 07/06/2021    LABGLOM 21 07/06/2021    GLUCOSE 95 07/06/2021              Assessment/     Chronic Kidney Disease:  Stage IV  - No proteinuria.  Multiple episodes of SHAUNNA  - Highly variable depending on cardiac and volume status but overall worsening      Chronic Hypotension:  Impacts pressure natriuresis      Hyponatremia:  Hypervolemic / Due to CHF              On tolvaptan 3 days a week     Systolic Heart Failure:  EF = 25%              Worsening edema and says she had gained 30 lbs but on room air     Plan/   Continue IV Lasix drip     Metolazone 5 mg Pseudophakia w/PCO OU-  discussed findings w/patient-  patient is doing well at this time-  mild PCO OU-  continue to monitor yearly or prnDES OU-  discussed findings w/patient-  mild symptoms at this time-  start Refresh Relieva at least BID OU-  continue to monitor yearly or prnType 2 DM w/o Retinopathy OU-  discussed findings w/patient-  Stressed the importance of keeping blood sugars under control blood pressure under control and weight normalization and regular visits with PCP.-  Explained the possible effects of poorly controlled diabetes and the damage that diabetes can cause to ocular health. -  will send notes to [place] PA-  Pt instructed to contact our office with any vision changes. -  Continue to monitor yearly or prnPVD OU:  -  Discussed findings of exam in detail with the patient. -  The risk of retinal detachment in patients with PVDs was discussed with the patient and the warning signs of retinal detachment were carefully reviewed with the patient. -  The patient was warned to return to the office or contact the ophthalmologist on call immediately if they experience signs of retinal detachment or changes in vision noted from today. -  Continue to monitorGlaucoma Suspect -  discussed findings w/patient-  IOP noted at 18 OU stable -  C/D asymmetry 0.75/0.75 0.8/0.8-  LONH superior/inferior thinning noted OU -  OCT ON done 10/5/2020:    OD: 76um 8/10 SS mild thinning superior/nasal normal RNFL temporal/inferiorly    OS: 84um 8/10 SS normal RNFL all quadrants-  Pach Cano 10/5/2020    OD: 541    OS: 542-  Baseline VF 24-2 done 11/5/2020     OD:  ?     OS:  ?-  Continue to monitor closely for changes; 's Notes: MR 6/26/2020DFE 1/10/2022Pach Jerome 10/5/2020OCT ON 10/5/861750-4 VF 11/5/2020. three times per week  Monitor serial labs

## 2023-01-31 NOTE — PROGRESS NOTES
Discharge instructions reviewed with patient and family member. Patient and family verbalized understanding. All home medications have been reviewed, questions answered and patient voiced understanding. Given prescriptions, discharge instructions, and appointment times. L/m that [place] office was cancelled office number left for patient to call and reschedule  Electronically signed by John John MA on 1/31/2023 at 7:46 AM    Another message was left to let the patient know that office was cancelled  Electronically signed by John John MA on 1/31/2023 at 8:37 AM

## 2023-06-07 NOTE — PROGRESS NOTES
Patient returned from cath lab. Right groin Mynx closed and WNL. VSS. Will continue to monitor. Topical Sulfur Applications Counseling: Topical Sulfur Counseling: Patient counseled that this medication may cause skin irritation or allergic reactions.  In the event of skin irritation, the patient was advised to reduce the amount of the drug applied or use it less frequently.   The patient verbalized understanding of the proper use and possible adverse effects of topical sulfur application.  All of the patient's questions and concerns were addressed.

## 2023-11-10 NOTE — CONSULTS
Insurance:  Number of Treatments Authorized: 6/11          Subjective   General  Reason for Referral: L knee pain s/p L knee scope chondroplasty of thochlea and medial femoral condyle  Referred By: Jose Rafael  Past Medical History Relevant to Rehab: anxiety, HTN, depression  General Comment: Patient states that she continues to have increased soreness and bending. Feels as though her muscle is tight and compressed as well as somthing in her knee is getting blocked.    Performing HEP?: Yes    Precautions  Precautions  LE Weight Bearing Status: Weight Bearing as Tolerated  Pain  Pain Assessment: 0-10  Pain Score: 4  Pain Location: Knee  Pain Orientation: Left  Pain Descriptors: Burning, Aching    Objective       General Observation  General Observation: Full TKE in supine      Treatments:    Therapeutic Exercise  Therapeutic Exercise Activity 1: Upright bike x 6 min seat 5 for extension  Therapeutic Exercise Activity 2: Heel prop on bolster  x 3 minutes  Therapeutic Exercise Activity 3: SAQ over bolster x 10  Therapeutic Exercise Activity 4: SAQ into SLR x 10  Therapeutic Exercise Activity 5: LAQ 2 x 10  Therapeutic Exercise Activity 6: Drew lateral step over x 20 ea direction  Therapeutic Exercise Activity 7: Psoas stretch in tall kneeling         Manual Therapy  Manual Therapy Activity 1: Clean field utilized with trigger point dry needling: Supine L quad x 12  needles 40 mm. HL x 4 30 mm in joint line Pistoning utilized for muscle twitch response  Manual Therapy Activity 2: L quad and hamstring STM  Manual Therapy Activity 3: Grade 3 AP glides in HL L                   OP EDUCATION:       Assessment:  PT Assessment  PT Assessment Results: Decreased strength,  Hospital Medicine  Consult History & Physical        Chief Complaint:  Headache and hypotension    Date of Service: Pt seen/examined in consultation on 5/17/2019    History Of Present Illness:      52 y.o. female who we are asked to see/evaluate by Jayson Hi MD for medical management of blood pressure and headache. Patient had femoral popliteal bypass yesterday for right lower extremity claudication. Her systolic blood pressure has been fluctuating widely from 70s to  130 today. She does not have chest pain, shortness of breath, dizziness, syncope, change in mental status, nausea, vomiting, fever or diarrhea. She does have headache which started today. More over for head and it goes to the back. No visual changes or any focal neurological symptoms. He has some cold and congestion. No ear pain or any swallowing difficulties. She just had regular diet/dinner  and holding. .  Complain nausea at time to time.     Past Medical History:        Diagnosis Date    Anxiety and depression     CAD (coronary artery disease) 01/2018    Cardiomyopathy ([address]     CHF (congestive heart failure) (HCC)     COPD (chronic obstructive pulmonary disease) (HCC)     Diabetes mellitus ([address]     GERD (gastroesophageal reflux disease)     Hyperlipidemia     Hypertension     MI (myocardial infarction) ([address]     Peripheral neuropathy     Peripheral vascular disease ([address]     Pulmonary HTN ([address]     Reflux     Sleep apnea     has never done sleep study;does not use CPAP    Wears glasses     for reading       Past Surgical History:        Procedure Laterality Date    ARTERIAL BYPASS SURGRY Left 01/06/2016    Axillary/Subclavian to Brachial Bypass w/reversed SVG    CARDIAC CATHETERIZATION  03/27/2018    Dr. Cordero Coffee - at [facility]      pancreatitis post surgery    CORONARY ANGIOPLASTY WITH STENT PLACEMENT  03/16/2018    MELODY- 3.5 x 38 and 3.5 x 20 to Cx    CORONARY on kwame step overs for proper gait mechanics and terminal knee extension in stance where patient was cued on full lockout as well as reducing hip extension in order to achieve. Partial carryover with gait following the session though patient did have to slow gait and think about mechanics in order to achieve improved mechanics.    Plan:  OP PT Plan  PT Plan: Skilled PT (L knee mobility with extension focus, gait training without AD, STM quad/hamstring, Dry Needling*, LQ strengthening)  Onset Date: 12/01/22 (DOS8/7/2023)  Number of Treatments Authorized: 6/11    Goals:  Active       PT Problem       Patient will be independent with HEP (Met)       Start:  09/25/23    Expected End:  09/28/23    Resolved:  10/17/23         Patient will decrease pain to 0/10 with rest and with activity  (Progressing)       Start:  09/25/23    Expected End:  10/05/23            Patient will increase strength of B LE to 5/5 to improve balance and walking mechanics  (Progressing)       Start:  09/25/23    Expected End:  10/05/23            Patient will increase ROM of L knee to equal that of the R for improved stair negotiation.  (Progressing)       Start:  09/25/23    Expected End:  10/05/23            Patient will ambulate for >1 hour, no AD, negotiating obstacles, making turns, on uneven surfaces ascending/descending > 15 stairs with a reciprocal pattern without increased pain. (Progressing)       Start:  09/25/23    Expected End:  10/05/23            Patient will perform SLS >/=10 seconds on L LE to be at decreased risk for falls.  (Progressing)       Start:  09/25/23    Expected End:  10/05/23            Patient will increase LEFS score by 9 points to meet MCID in  (proper mechanics without increased pain to return to normal exercise activity.  (Progressing)       Start:  09/25/23    Expected End:  10/19/23                patient will increase LEFS score to >/=75/80.  (Progressing)       Start:  09/25/23    Expected End:  10/19/23                     Bienvenido Tran, PT   (KLOR-CON M) 20 MEQ extended release tablet Take 2 tablets by mouth 3 times daily  Patient taking differently: Take 40 mEq by mouth 2 times daily  2/6/19  Yes TRAY Oneill CNP   clopidogrel (PLAVIX) 75 MG tablet Take 1 tablet by mouth daily 1/31/19  Yes TRAY Oneill CNP   tiotropium (SPIRIVA RESPIMAT) 2.5 MCG/ACT AERS inhaler Inhale 2 puffs into the lungs daily 12/4/18  Yes Sharee Vee MD   carvedilol (COREG) 3.125 MG tablet Take 1 tablet by mouth 2 times daily (with meals) 11/28/18  Yes TRAY Oneill CNP   atorvastatin (LIPITOR) 80 MG tablet Take 80 mg by mouth nightly 5/2/18  Yes Historical Provider, MD   busPIRone (BUSPAR) 30 MG tablet Take 30 mg by mouth 3 times daily 4/2/18  Yes Historical Provider, MD   metFORMIN (GLUCOPHAGE-XR) 500 MG extended release tablet Take 1,000 mg by mouth 2 times daily 4/2/18  Yes Historical Provider, MD   buprenorphine-naloxone (SUBOXONE) 8-2 MG SUBL SL tablet Place 2 tablets under the tongue daily. .   Yes Historical Provider, MD   doxepin (SINEQUAN) 50 MG capsule Take 50 mg by mouth nightly   Yes Historical Provider, MD   aspirin EC 81 MG EC tablet Take 1 tablet by mouth daily 1/8/16  Yes Malena Barry MD   pantoprazole (PROTONIX) 40 MG tablet Take 1 tablet by mouth daily  Patient taking differently: Take 40 mg by mouth 2 times daily  12/11/15  Yes Selene Radford MD   ARIPiprazole (ABILIFY) 10 MG tablet Take 5 mg by mouth daily    Yes Historical Provider, MD   lamoTRIgine (LAMICTAL) 150 MG tablet Take 150 mg by mouth 2 times daily    Yes Historical Provider, MD   Insulin Pen Needle (B-D ULTRAFINE III SHORT PEN) 31G X 8 MM MISC Five shots a day 4/9/19   Silvana Barrow MD       Allergies:  Lisinopril    Social History:      The patient currently lives at home    TOBACCO:   reports that she has been smoking cigarettes. She has a 15.00 pack-year smoking history.  She has never used smokeless tobacco.  ETOH:   reports that she does not drink alcohol. Family History:      Reviewed in detail and negative for DM, CAD, Cancer, CVA. Positive as follows:        Problem Relation Age of Onset    Heart Disease Maternal Grandmother     Cancer Mother         lung and brain cancer    Cancer Maternal Aunt         lung and brain cancer       REVIEW OF SYSTEMS:   Pertinent positives as noted in the HPI. All other systems reviewed and negative. PHYSICAL EXAM PERFORMED:  BP 91/62   Pulse 104   Temp 97.8 °F (36.6 °C) (Oral)   Resp 18   Ht 5' 4.5\" (1.638 m)   Wt 235 lb (106.6 kg)   LMP 10/24/2012   SpO2 92%   BMI 39.71 kg/m²   General appearance: No apparent distress, appears stated age and cooperative. Obese complaining headache  HEENT: Normal cephalic, atraumatic without obvious deformity. Pupils equal, round, and reactive to light. Extra ocular muscles intact. Conjunctivae/corneas clear. Neck: Supple, with full range of motion. No jugular venous distention. Trachea midline. No neck stiffness  Respiratory:  Normal respiratory effort. Reduced air entry, bilaterally without Rales/Wheezes/Rhonchi. Cardiovascular: Regular rate and rhythm with normal S1/S2 without murmurs, rubs or gallops. Abdomen: Soft, non-tender, non-distended with normal bowel sounds. Obese  Musculoskeletal:No clubbing, cyanosis or edema bilaterally. Right fem-pop bypass, peripheral pulses felt, dressing changed this morning, has drain  Skin: Skin color, texture, turgor normal.  No rashes or lesions. Neurologic:  Neurovascularly intact without any focal sensory/motor deficits.  Cranial nerves: II-XII intact, grossly non-focal.  Psychiatric: Alert and oriented, thought content appropriate, normal insight  Capillary Refill: +  Peripheral Pulses: +1 palpable, equal bilaterally   Peripheral pulses faint over the left t radial compared to right    Labs:     Recent Labs     05/17/19  0503   WBC 13.9*   HGB 10.9*   HCT 32.7*        Recent Labs     05/15/19  0946 05/17/19  0503   * 131*   K 3.6 4.0   CL 82* 87*   CO2 33* 31   BUN 25* 21*   CREATININE 1.4* 1.2*   CALCIUM 9.3 8.7     No results for input(s): AST, ALT, BILIDIR, BILITOT, ALKPHOS in the last 72 hours. No results for input(s): INR in the last 72 hours. No results for input(s): [place] in the last 72 hours. Urinalysis:    Lab Results   Component Value Date    NITRU Negative 05/15/2019    WBCUA None seen 05/23/2018    BACTERIA 1+ 03/14/2017    RBCUA 3-5 05/23/2018    BLOODU Negative 05/15/2019    SPECGRAV <=1.005 05/15/2019    GLUCOSEU Negative 05/15/2019       Radiology: I have reviewed the radiology reports with the following interpretation:     VL DUP UPPER EXTREMITY ARTERIES BILATERAL   Final Result      FL LESS THAN 1 HOUR    (Results Pending)          EKG:  I have reviewed the EKG with the following interpretation:    @ekgread@      ASSESSMENT:    Active Hospital Problems    Diagnosis Date Noted    PVD (peripheral vascular disease) ([facility] [I73.9]      Priority: High    CAD (coronary artery disease) [I25.10]      Priority: High    Hypotension [I95.9] 05/17/2019    COPD (chronic obstructive pulmonary disease) ([facility] [J44.9] 05/17/2019    Claudication in peripheral vascular disease ([facility] [I73.9] 05/16/2019    CKD (chronic kidney disease) stage 3, GFR 30-59 ml/min ([facility] [N18.3] 01/09/2019    Chronic systolic congestive heart failure ([facility] [I50.22] 11/30/2018    CLINT (obstructive sleep apnea) [G47.33] 12/10/2015    DM2/IDDM [E11.9] 12/10/2015    Severe claudication ([facility] [I73.9] 12/04/2015       PLAN:   Hypertension- currently blood pressure has been running low,  Las t  blood pressure was 91 /62- she's asymptomatic. She received diuretics and blood pressure medications this morning. She received extra dose of IV Lasix one time.   Hold diuretics and other antihypertensive, telemetry, EKG, troponin to rule out cardiac causes, there is no evidence of infection and patient does not seem to be septic, 250 IV bolus 1 time  Upper extremity arterial Doppler there is no evidence of occlusion    Aldactone was cut down to 50 daily  With  BP parameters and  Coreg was  cont with  BP para meters, torsemide  And zaroxolyn  Were  Discontinued    Received those whenever appropriate      PVD-status post right fem-pop bypass-    History of left subclavian bypass surgery-peripheral pulses weak    Chronic kidney disease-creatinine is stable      Chronic systolic congestive heart failure ejection fraction is 40-45%-no evidence of fluid overload at this time    Obstructive sleep apnea-verify the use of CPAP overnight      Diabetes mellitus-Lantus 35 units, 15 reg insulin AC , and monitor blood sugar with sliding scale,      insulin blood pressures running in 200s- may increase to medium sliding scale, hemoglobin A1c  Metformin is on hold    Acute blood loss anemia secondary to surgery?-hemoglobin dropped from 14- to 10 within a month- no active bleed, monitor hemoglobin, stool guaiac, anemia workup, consider GI eval if necessary, currently she is on aspirin and Plavix      History of narcotic addict-currently on Suboxone      Obesity-counseled      Headache-possibly sinus, she has a history of migraine in the remote past-Fioricet, her white NSAID secondary to renal insufficiency          DVT Prophylaxis: No Lovenox-because of double antiplatelets and anemia  Diet: DIET CARB CONTROL;  Code Status: Full Code    PT/OT Eval Status: Active and ongoing    Dispo - pending progress/     Thank you for the consultation, will follow up as needed    Wesly Amezquita MD

## 2024-06-15 NOTE — PROGRESS NOTES
Occupational Therapy  Facility/Department: [address]  Daily Treatment Note  NAME: Giancarlo Wang  : 1965  MRN: [id number]    Date of Service: 2021    Discharge Recommendations:    Giancarlo Wang scored a 16/24 on the AM-PAC ADL Inpatient form. Current research shows that an AM-PAC score of 17 or less is typically not associated with a discharge to the patient's home setting. Based on the patient's AM-PAC score and their current ADL deficits, it is recommended that the patient have 3-5 sessions per week of Occupational Therapy at d/c to increase the patient's independence. Please see assessment section for further patient specific details. If patient discharges prior to next session this note will serve as a discharge summary. Please see below for the latest assessment towards goals. Assessment   Assessment: Pt demonstrating short distance functional mobility with RW and Min A. Pt with poor compliance for NWB precautions. Pt completing ADL gooming tasks seated infront of sink with CGA to Min A and increased time. Pt would benefit from inpt OT for maximizing functional independence and safety. Continue OT per POC      Treatment Diagnosis: decreased independence with ADLs and fx mobility secondary to NWB status on LLE  OT Education: OT Role;Plan of Care;Transfer Training;ADL Adaptive Strategies; Energy Conservation  Patient Education: pt verb understanding; requires inforcement as needed  Activity Tolerance  Activity Tolerance: Patient Tolerated treatment well  Activity Tolerance: poor complaince to NWB status on LLE during functional ambulation         Restrictions  Restrictions/Precautions  Restrictions/Precautions: Weight Bearing  Lower Extremity Weight Bearing Restrictions  Left Lower Extremity Weight Bearing: Non Weight Bearing  Position Activity Restriction  Other position/activity restrictions: NWB L leg       Diagnosis: heart failure  Treatment Diagnosis: decreased independence with ADLs and fx mobility secondary to NWB status on LLE    Subjective:   Pt met seated on EOB and agreeable to OT treatment      Pain:   Pt stating \"not to much\". Objective:    Scooting   SBA for scooting back in chair      Functional Mobility   Sit to Stand: Min A from EOB and BSC. Pt requiring VCs for safe hand placement due to pulling on RW however pt unable to comply. Stand to Sit: Min A with VCS for posiitoning  Bed to Chair Transfer:  Min A from EOB to [facility] to recliner chair with VCs for positioning and tech. Other:  Functional mobility demonstrated two short distances with  RW from EOB to sink in room and from sink to recliner chair pulled close. Pt requiring Min A and unable to comply with NWB precautions for LLE. ADLs   Grooming: SBA with VCs for oral care and washing hands and face seated on BSC infront of sink  Bathing: SBA for UE washing seated on BSC  UB dressing: Min A donning gown seated EOB   LB dressing: Max A donning socks     Safety Devices in Place:  Pt left seated in recliner chair with alarm on and call light in reach.          Plan  If pt discharges prior to next treatment, this note will serve as discharge summary  Plan  Times per week: 2-5           AM-PAC Score        AM-[facility] Inpatient Daily Activity Raw Score: 16 (06/29/21 1121)  AM-PAC Inpatient ADL T-Scale Score : 35.96 (06/29/21 1121)  ADL Inpatient CMS 0-100% Score: 53.32 (06/29/21 1121)  ADL Inpatient CMS G-Code Modifier : CK (06/29/21 1121)    Goals (as determined and assessed by primary OT)  Short term goals  Time Frame for Short term goals: by discharge  Short term goal 1: Pt will perform sit-stand transfer while following NWB restriction on LLE with no more than CGA- not met  Short term goal 2: Pt will perform LB dressing via adaptive technique while following NWB status with set-up- not met  Short term goal 3: Pt will perform toileting assessment- not met  Patient Goals   Patient goals : return home Therapy Time   Individual Concurrent Group Co-treatment   Time In 0635         Time Out 1121         Minutes 38         Timed Code Treatment Minutes: BIANCA Kruger 46 This was a shared visit with the JAY. I reviewed and verified the documentation.

## 2024-06-17 NOTE — PLAN OF CARE
Problem: Pain:  Goal: Pain level will decrease  Description: Pain level will decrease  Outcome: Met This Shift  Note: Patient educated on verbalizing complaints of pain utilizing the pain scale, pharmacological and non pharmacological pain control methods utilized. Will continue to monitor. Goal: Control of acute pain  Description: Control of acute pain  Outcome: Met This Shift  Goal: Control of chronic pain  Description: Control of chronic pain  Outcome: Met This Shift     Problem: Skin Integrity:  Goal: Will show no infection signs and symptoms  Description: Will show no infection signs and symptoms  Outcome: Met This Shift  Goal: Absence of new skin breakdown  Description: Absence of new skin breakdown  Outcome: Met This Shift     Problem: Falls - Risk of:  Goal: Will remain free from falls  Description: Will remain free from falls  Outcome: Met This Shift  Note: Patient educated on fall risk precautions. Patient bed in low position, bed alarm in place, call light and bedside table within reach. No falls reported this shift.     Goal: Absence of physical injury  Description: Absence of physical injury  Outcome: Met This Shift     Problem: Nutrition  Goal: Optimal nutrition therapy  Outcome: Met This Shift     Problem: OXYGENATION/RESPIRATORY FUNCTION  Goal: Patient will maintain patent airway  Outcome: Met This Shift  Goal: Patient will achieve/maintain normal respiratory rate/effort  Description: Respiratory rate and effort will be within normal limits for the patient  Outcome: Met This Shift     Problem: HEMODYNAMIC STATUS  Goal: Patient has stable vital signs and fluid balance  Outcome: Met This Shift     Problem: FLUID AND ELECTROLYTE IMBALANCE  Goal: Fluid and electrolyte balance are achieved/maintained  Outcome: Met This Shift     Problem: ACTIVITY INTOLERANCE/IMPAIRED MOBILITY  Goal: Mobility/activity is maintained at optimum level for patient  Outcome: Met This Shift     Problem: Discharge Spoke with pt to let her know med , call was disconnected by incoming call   Planning:  Goal: Discharged to appropriate level of care  Description: Discharged to appropriate level of care  Outcome: Met This Shift     Problem: Serum Glucose Level - Abnormal:  Goal: Ability to maintain appropriate glucose levels will improve  Description: Ability to maintain appropriate glucose levels will improve  Outcome: Met This Shift     Problem: Sensory Perception - Impaired:  Goal: Ability to maintain a stable neurologic state will improve  Description: Ability to maintain a stable neurologic state will improve  Outcome: Met This Shift

## 2024-10-11 NOTE — PROGRESS NOTES
Outpatient Consultation / H&P    Date of Consultation:  5/10/2019    PCP:  Elise Lopez PA-C     Referring Provider:  Dr. Anne Sinclair     Chief Complaint:   Chief Complaint   Patient presents with    Circulatory Problem     patient ref by Dr Anne Sinclair for arterial stenosis. pamlr        History of Present Illness: We are asked to see this patient in consultation by Dr. Anne Sinclair regarding recurrent SFA occlusions. Kishore Jaeger is a 52 y.o. female who has a history of PVD, Type II Dm, and tobacco abuse. She is actively trying to quit smoking. I previously performed left Axillary to Brachial bypass. She also underwent bilateral SFA interventions by others including proximal stenting or R SFA and long segment stenting of Left SFA. Both of these interventions have failed. She now has severe claudication symptoms bilaterally, worse on the right. She saw Dr Anne Sinclair and CTA was performed. She is referred here for consideration of surgical intervention. Left Thigh GSV has been harvested for prior bypass.       Past Medical History:  Past Medical History:   Diagnosis Date    Anxiety and depression     CAD (coronary artery disease) 01/2018    Cardiomyopathy ([address]     CHF (congestive heart failure) (HCC)     COPD (chronic obstructive pulmonary disease) (HCC)     Diabetes mellitus ([address]     GERD (gastroesophageal reflux disease)     Hyperlipidemia     Hypertension     Peripheral neuropathy     Peripheral vascular disease (HCC)     Pulmonary HTN ([address]     Reflux     Sleep apnea     has never done sleep study;does not use CPAP    Wears glasses     for reading       Past Surgical History:  Past Surgical History:   Procedure Laterality Date    ARTERIAL BYPASS SURGRY Left 01/06/2016    Axillary/Subclavian to Brachial Bypass w/reversed SVG    CARDIAC CATHETERIZATION  03/27/2018    [facility]      pancreatitis post surgery    CORONARY ANGIOPLASTY WITH STENT PLACEMENT  03/16/2018    MELODY- 3.5 x 38 and 3.5 x 20 to Cx    CORONARY ANGIOPLASTY WITH STENT PLACEMENT  01/03/2018    MELODY- 3.0 x 38 and 2.75 x 26 and 2.75 x 8 and 2.75 x 22 to the LAD    INSERTABLE CARDIAC MONITOR  02/14/2019    CardioMEMs insertion for CHF    ROTATOR CUFF REPAIR Left 04/22/2016    TONSILLECTOMY      TUMOR EXCISION      benign tumors X 2 chest & axilla    UPPER GASTROINTESTINAL ENDOSCOPY  4/25/2013    BX barretts, HH, gastritis    UPPER GASTROINTESTINAL ENDOSCOPY  12/10/2015    UPPER GASTROINTESTINAL ENDOSCOPY  01/06/2017    Gastritis       Home Medications:   Prior to Admission medications    Medication Sig Start Date End Date Taking?  Authorizing Provider   torsemide (DEMADEX) 100 MG tablet Take 0.5 tablets by mouth daily 5/2/19  Yes TRAY Calabrese CNP   magnesium oxide (MAG-OX) 400 (240 Mg) MG tablet TAKE 1 TABLET ONCE DAILY 5/1/19  Yes Yves Nazario MD   insulin glargine (LANTUS) 100 UNIT/ML injection vial Inject 35 Units into the skin 2 times daily 4/9/19  Yes Miles Brenner MD   insulin lispro (HUMALOG KWIKPEN) 100 UNIT/ML pen Inject 15 Units into the skin 3 times daily (before meals) 4/9/19  Yes Miles Brenner MD   Insulin Pen Needle (B-D ULTRAFINE III SHORT PEN) 31G X 8 MM MISC Five shots a day 4/9/19  Yes Miles Brenner MD   metolazone (ZAROXOLYN) 5 MG tablet TAKE 1 TABLET BY MOUTH EVERY DAY 4/3/19  Yes TRAY Vang CNP   digoxin (LANOXIN) 125 MCG tablet Take 1 tablet by mouth daily 4/3/19  Yes Claire Sabchalino IvanaTRAY CNP   albuterol sulfate HFA (VENTOLIN HFA) 108 (90 Base) MCG/ACT inhaler Inhale 2 puffs into the lungs every 6 hours as needed for Wheezing or Shortness of Breath 3/6/19  Yes Sigifredo Tucker MD   sacubitril-valsartan (ENTRESTO) 24-26 MG per tablet Take 0.5 tablets by mouth every evening 2/25/19  Yes TRAY Vang CNP   potassium chloride (KLOR-CON M) 20 MEQ extended release tablet Take 2 tablets by mouth 3 times daily  Patient taking differently: Take 40 mEq by mouth 2 times daily  2/6/19  Yes TRAY Gooden CNP   clopidogrel (PLAVIX) 75 MG tablet Take 1 tablet by mouth daily 1/31/19  Yes TRAY Gooden CNP   nystatin (MYCOSTATIN) 020015 UNIT/ML suspension Take 5 mLs by mouth 4 times daily 1/9/19  Yes TRAY Gooden CNP   tiotropium (SPIRIVA RESPIMAT) 2.5 MCG/ACT AERS inhaler Inhale 2 puffs into the lungs daily 12/4/18  Yes Justin Gibson MD   carvedilol (COREG) 3.125 MG tablet Take 1 tablet by mouth 2 times daily (with meals) 11/28/18  Yes TRAY Gooden CNP   albuterol (PROVENTIL) (2.5 MG/3ML) 0.083% nebulizer solution Inhale 3 mLs into the lungs 7/5/18  Yes Historical Provider, MD   atorvastatin (LIPITOR) 80 MG tablet Take 80 mg by mouth nightly 5/2/18  Yes Historical Provider, MD   busPIRone (BUSPAR) 30 MG tablet Take 30 mg by mouth 3 times daily 4/2/18  Yes Historical Provider, MD   metFORMIN (GLUCOPHAGE-XR) 500 MG extended release tablet Take 1,000 mg by mouth 2 times daily 4/2/18  Yes Historical Provider, MD   buprenorphine-naloxone (SUBOXONE) 8-2 MG SUBL SL tablet Place 2 tablets under the tongue daily. .   Yes Historical Provider, MD   doxepin (SINEQUAN) 50 MG capsule Take 50 mg by mouth nightly   Yes Historical Provider, MD   nitroGLYCERIN (NITROSTAT) 0.4 MG SL tablet Place 0.4 mg under the tongue every 5 minutes as needed for Chest pain (times 3) up to max of 3 total doses. If no relief after 1 dose, call 911.    Yes Historical Provider, MD   aspirin EC 81 MG EC tablet Take 1 tablet by mouth daily 1/8/16  Yes Nick Lovett MD   pantoprazole (PROTONIX) 40 MG tablet Take 1 tablet by mouth daily  Patient taking differently: Take 40 mg by mouth 2 times daily  12/11/15  Yes Gertrudis Parr MD   ARIPiprazole (ABILIFY) 10 MG tablet Take 5 mg by mouth daily    Yes Historical Provider, MD   lamoTRIgine (LAMICTAL) 150 MG tablet Take 150 mg by mouth 2 times daily    Yes point review of systems was completed. Pertinent positives identified in the HPI, all other review of systems negative. Physical Examination:    /68 (Site: Left Upper Arm)   Ht 5' 4.5\" (1.638 m)   Wt 233 lb (105.7 kg)   LMP 10/24/2012   BMI 39.38 kg/m²     Weight: 233 lb (105.7 kg)       General appearance: NAD  Eyes: PERRLA  Neck: no JVD, no lymphadenopathy. Respiratory: effort is unlabored, no crackles, wheezes or rubs. Cardiovascular: regular, no murmur. No carotid bruits. Pulses:    femoral DP PT   RIGHT 2 doppler doppler   LEFT 2 doppler doppler   GI: abdomen soft, nondistended, no organomegaly. Musculoskeletal: strength and tone normal.  Extremities:BLE edema and rubor. Palpable LUE graft pulse  Neuro/psychiatric: grossly intact. MEDICAL DECISION MAKING/TESTING        CT: BLE SFA and stent occlusions. Reconstitution of distal SFA's. R Peroneal runoff to R Foot. 3 vessel runoff to left but very small vessels. Arterial duplex:   Impressions   Right Impression   1. The right ankle/brachial index is 0.28.   2. There are elevated velocities seen involving the deep femoral artery. The   proximal superficial femoral and mid posterior tibial arteries demonstrated   Doppler silence. 3. There is abnormal monophasic flow throughout the lower extremity. 4. There is large plaque seen involving the proximal superficial femoral and   mid posterior tibial arteries with no color flow. Left Impression   1. The left ankle/brachial index could not be obtained due to dampened flow in   the calf arteries. 2. There are focal elevated velocities seen involving the deep femoral artery. The proximal superficial femoral artery demonstrated Doppler silence. 3. There is abnormal monophasic flow throughout the lower extremity. 4. There is large plaque seen involving the proximal superficial femoral   artery with no color flow. Assessment:      Diagnosis Orders   1.  PVD (peripheral vascular disease) with claudication (HCC)       Severe claudication RLE, moderate LLE    Recommendations/Plan:    RLE bypass. Depending on results possible LLE bypass in future. Discussed all risks ( including but not limited to death, MI, infection, hemorrhage wound healing problems, graft failure and nerve injury), benefits and alternatives to distal bypass with patient. She understands, freely consents and is eager to proceed. All questions and expectations addressed.       Omari Garcia MD, FACS no

## 2025-01-28 NOTE — PROGRESS NOTES
Independent ALLYSON Visit.      [facility]  Department of Emergency Medicine   ED  Encounter Note  Admit Date/RoomTime: 2025  3:05 PM  ED Room: WAITING RESULTS/WAITING *    NAME: Sarah Gomez  : 1984  MRN: [id number]     Chief Complaint:  Dental Pain (Treated with Augmentin two weeks ago)    History of Present Illness        Sarah Gomez is a 40 y.o. old female who presents to the emergency department by private vehicle, for right upper dental pain that has been ongoing for a little over 2 weeks.  She was on Augmentin which helped clear it up, but over the last few days, the pain and swelling returned.  She was supposed to be evaluated by a dentist on , however the appointment got pushed back.    She has no more antibiotics.  She denies fevers. No difficulty swallowing or controlling secretions.      ROS   Pertinent positives and negatives are stated within HPI, all other systems reviewed and are negative.    Past Medical History:  has a past medical history of Anemia, Angioedema, Arthritis, Cancer (HCC), Chronic fatigue, Chronic gastritis, Chronic urticaria, Gill Barr infection, Fibromyalgia, Hiatal hernia, HTN (hypertension), IBS (irritable bowel syndrome), Interstitial cystitis, Lyme disease, Migraines, Post partum depression, POTS (postural orthostatic tachycardia syndrome), and Restless leg.    Surgical History:  has a past surgical history that includes Breast lumpectomy (, ); Colonoscopy (); Upper gastrointestinal endoscopy (); Cystoscopy (); Hysterectomy (2014); Vaginal prolapse repair; and Breast biopsy.    Social History:  reports that she has been smoking. She has never used smokeless tobacco. She reports that she does not currently use alcohol after a past usage of about 2.0 standard drinks of alcohol per week. She reports that she does not use drugs.    Family History: family history includes Arthritis in her mother;  Nephrology Progress Note   [email or web address]      This patient is a 62year old female whom we are following for SHAUNNA on CKD. Subjective: The patient was seen and examined. Continues to diurese on Dobutamine drip. Great urine output. Sodium low and kidney function overall stable. Off lasix gtt. Sodium is 131. Weight is down to 235 lbs     Family History: No family at bedside  ROS: No fever or chills, + swelling. No SOB. Vitals:  /74   Pulse 90   Temp 98.4 °F (36.9 °C) (Oral)   Resp 19   Ht 5' 4.5\" (1.638 m)   Wt 235 lb 6.4 oz (106.8 kg)   LMP 10/24/2012   SpO2 95%   BMI 39.78 kg/m²   I/O last 3 completed shifts: In: 1059.1 [P.O.:840; I.V.:219.1]  Out: 2050 [Urine:2050]  No intake/output data recorded. Physical Exam:  Physical Exam  Vitals signs reviewed. Constitutional:       General: She is not in acute distress. Appearance: Normal appearance. HENT:      Head: Normocephalic and atraumatic. Mouth/Throat:      Mouth: Mucous membranes are moist.   Eyes:      General: No scleral icterus. Conjunctiva/sclera: Conjunctivae normal.   Cardiovascular:      Rate and Rhythm: Normal rate. Heart sounds: No friction rub. Pulmonary:      Effort: Pulmonary effort is normal.      Comments: Diminished breath sounds bilateral  Abdominal:      Tenderness: There is no abdominal tenderness. Musculoskeletal:      Right lower leg: Edema present. Left lower leg: Edema present. Neurological:      Mental Status: She is alert.            Medications:   metOLazone  5 mg Oral Once    tolvaptan  30 mg Oral Once    polyethylene glycol  17 g Oral Daily    potassium chloride  40 mEq Oral TID    torsemide  50 mg Oral BID    sodium chloride flush  10 mL Intravenous 2 times per day    docusate sodium  100 mg Oral Daily    ARIPiprazole  10 mg Oral Daily    aspirin EC  81 mg Oral Daily    benztropine  1 mg Oral Nightly    buprenorphine-naloxone  1 Film Sublingual BID    clopidogrel  75 mg Oral Daily    ferrous sulfate  325 mg Oral BID     lamoTRIgine  200 mg Oral BID    magnesium oxide  400 mg Oral Daily    midodrine  5 mg Oral BID     pantoprazole  40 mg Oral BID    spironolactone  50 mg Oral Daily    tiotropium  2 puff Inhalation Daily    insulin lispro  0-12 Units Subcutaneous TID     insulin lispro  0-6 Units Subcutaneous Nightly    busPIRone  30 mg Oral BID    insulin glargine  18 Units Subcutaneous Nightly    atorvastatin  80 mg Oral Nightly    sodium chloride flush  10 mL Intravenous 2 times per day         Labs:  Recent Labs     08/28/20  0441   WBC 5.8   HGB 9.8*   HCT 30.2*   MCV 83.0        Recent Labs     08/27/20  0410 08/28/20  0441 08/29/20  0433   * 131* 131*   K 3.7 3.3* 3.6   CL 82* 83* 84*   CO2 31 30 30   GLUCOSE 113* 177* 87   PHOS 2.8 3.2 3.2   BUN 44* 40* 40*   CREATININE 2.0* 2.1* 2.1*   LABGLOM 26* 24* 24*   GFRAA 31* 29* 29*           Assessment/Plan:    SHAUNNA/CKD stage 3.  - Etiology: ATN (hypotension; contrast dye) from last admission  - Data: SCr peaked at 2.0, improved to 1.2mg/dL but now 2.1. Weight is down, still with swelling and volume up on pressure readings   - Inotrope added on 8/24/20.     CHF - acute on chronic  - On Dobutamine drip. - 2 liter/day fluid restriction  - Strict I's/O's, daily weights   - CardioMEMS monitoring per Cardiology  - On Torsemide, will add metolazone x 1 and tolvaptan to augment diuresis      Recent Perinephric hematoma. - Complication of L renal artery angioplasty / stent placement  - S/P coil embolization of subsegmental L renal artery  - Stable in size based on CT this admission     L Renal Artery Stenosis. - S/P L renal artery angioplasty / stent placement  - BP satisfactory. - Follow for late recurrence of HTN (post-perinephric bleed) (I.e, Page kidney).     Anemia - blood loss.   - Hgb stabilized in 8-9 range now  - S/P transfusion 2 units PRBC's last admission, on oral Fe.     Hyponatremia  - Has been present consistently since 4/20   - Related to volume excess / CHF          Hypokalemia.  - Stable on K replacement to 40 meq TID    PLAN:  - Metolazone   - Mucinex   - Seems to be making progress, BUN is down slightly which indicates better renal perfusion   - Will give a dose of tolvaptan today for hypervolemic hyponatremia  - Follow labs and electrolytes           Please do not hesitate to contact me at [phone] if with questions. Thank you!     Naresh Garner MD  8/29/2020  [facility]

## (undated) DEVICE — 3 ML SYRINGE LUER-LOCK TIP: Brand: MONOJECT

## (undated) DEVICE — PINNACLE PRECISION ACCESS SYSTEM INTRODUCER SHEATH: Brand: PINNACLE PRECISION ACCESS SYSTEM

## (undated) DEVICE — CANISTER NEG PRSS 500ML WHT SENSATRAC TBNG CLMP CONN

## (undated) DEVICE — SUTURE VCRL SZ 3-0 L27IN ABSRB UD L36MM CT-1 1/2 CIR J258H

## (undated) DEVICE — SUTURE VCRL + SZ 2-0 L27IN ABSRB CLR CT-1 1/2 CIR TAPERCUT VCP259H

## (undated) DEVICE — SUTURE VCRL SZ 3-0 L27IN ABSRB UD L26MM SH 1/2 CIR J416H

## (undated) DEVICE — STERILE POLYISOPRENE POWDER-FREE SURGICAL GLOVES: Brand: PROTEXIS

## (undated) DEVICE — SUTURE NONABSORBABLE MONOFILAMENT 5-0 C-1 1X24 IN PROLENE 8725H

## (undated) DEVICE — GAUZE,SPONGE,4"X4",16PLY,XRAY,STRL,LF: Brand: MEDLINE

## (undated) DEVICE — COVER TRNSDUC W6XL96IN POLY TELESCOPICALLY FLD W/ ATTCH FRM

## (undated) DEVICE — Device

## (undated) DEVICE — SOLUTION IV IRRIG POUR BRL 0.9% SODIUM CHL 2F7124

## (undated) DEVICE — SUTURE NONABSORBABLE MONOFILAMENT 7-0 BV-1 1X24 IN PROLENE 8702H

## (undated) DEVICE — GLOVE SURG SZ 8 L11.77IN FNGR THK9.8MIL STRW LTX POLYMER

## (undated) DEVICE — TOWEL,OR,DSP,ST,BLUE,STD,6/PK,12PK/CS: Brand: MEDLINE

## (undated) DEVICE — FOGARTY - HYDRAGRIP SURGICAL - CLAMP INSERTS: Brand: FOGARTY SOFTJAW

## (undated) DEVICE — SUTURE NONABSORBABLE MONOFILAMENT 6-0 C-1 1X30 IN PROLENE 8706H

## (undated) DEVICE — SUTURE VCRL SZ 4-0 L18IN ABSRB UD L19MM PS-2 3/8 CIR PRIM J496H

## (undated) DEVICE — SYRINGE ANGIO 12ML COR CTRL ROT ADPT SLD PLUNG CLR BRL M

## (undated) DEVICE — SUTURE ABSORBABLE BRAIDED 2-0 CT-1 27 IN UD VICRYL J259H

## (undated) DEVICE — SMALL (YELLOW) FOR GRAFTS UP TO 6 MM, 12" / 30.5 CM (1/PKG): Brand: SCANLAN® VASCULAR TUNNELER SHEATHS AND BULLET TIPS

## (undated) DEVICE — PACK PROCEDURE SURG OPN HRT A BASIC

## (undated) DEVICE — KIT APPL 11:1 PROC W/ FIBRIJET MED CUP APPL TIP TY

## (undated) DEVICE — BATTERY DRVR W/ SELF DRL TAP FOR THINFLAP PWR DRVR

## (undated) DEVICE — SUTURE PROL SZ 3-0 L36IN NONABSORBABLE BLU L26MM SH 1/2 CIR 8522H

## (undated) DEVICE — BLADE STRNM SAW STR 10/BX

## (undated) DEVICE — SYSTEM SKIN CLSR 22CM DERMBND PRINEO

## (undated) DEVICE — SUTURE PROL SZ 4-0 L36IN NONABSORBABLE BLU BB L17MM 3/8 CIR 8581H

## (undated) DEVICE — SUTURE VCRL + SZ 3-0 L27IN ABSRB UD L26MM SH 1/2 CIR VCP416H

## (undated) DEVICE — DRAIN SURG FLAT W7MMXL20CM FULL PERF

## (undated) DEVICE — SUTURE PERMAHAND SZ 3-0 L30IN NONABSORBABLE BLK SILK BRAID A304H

## (undated) DEVICE — GOWN SIRUS NONREIN LG W/TWL: Brand: MEDLINE INDUSTRIES, INC.

## (undated) DEVICE — SUTURE PROL SZ 6-0 L24IN NONABSORBABLE BLU L13MM C-1 3/8 8726H

## (undated) DEVICE — SUTURE SZ 7 L18IN NONABSORBABLE SIL CCS L48MM 1/2 CIR STRNM M655G

## (undated) DEVICE — Z DUPLICATE USE 2624755 KIT NEG PRSS DSG W/ PRSS INDIC PTCH STRP 45ML CANSTR CARR

## (undated) DEVICE — SNAP KAP: Brand: UNBRANDED

## (undated) DEVICE — SUTURE ETHBND EXCEL SZ 2-0 L36IN NONABSORBABLE GRN L26MM SH X523H

## (undated) DEVICE — APPLIER CLP L9.375IN APER 2.1MM CLS L3.8MM 20 SM TI CLP

## (undated) DEVICE — APPLIER LIG CLP M L11IN TI STR RNG HNDL FOR 20 CLP DISP

## (undated) DEVICE — SHUNT CV L14MM DIA1.5MM IC SIL TAPR TIP RADPQ CLRVW
Type: IMPLANTABLE DEVICE | Site: HEART | Status: NON-FUNCTIONAL
Removed: 2020-01-27

## (undated) DEVICE — LIGHT HANDLE: Brand: DEVON

## (undated) DEVICE — THORACIC CATHETER, RIGHT ANGLE, SILICONE, WITH CLOTSTOP®: Brand: AXIOM® ATRAUM™ WITH CLOTSTOP®

## (undated) DEVICE — LOOP VES L406MM DIA1MM MAXI BLU SIL RADPQ DISP

## (undated) DEVICE — STAPLER EXT SKIN 35 WIDE S STL STPL SQUEEZE HNDL VISISTAT

## (undated) DEVICE — TIP APPL 20 GAX5 CM 2 CANN MALL

## (undated) DEVICE — CHLORAPREP 26ML ORANGE

## (undated) DEVICE — PUNCH AORT DIA4MM LNG HNDL

## (undated) DEVICE — SUTURE ETHBND EXCEL SZ 0 L18IN NONABSORBABLE GRN L36MM CT-1 CX21D

## (undated) DEVICE — TRAY,ERASE CAUTI,URN MTR,SILI,16FR10ML: Brand: MEDLINE

## (undated) DEVICE — EZ GLIDE AORTIC CANNULA: Brand: EDWARDS LIFESCIENCES EZ GLIDE AORTIC CANNULA

## (undated) DEVICE — GOWN SIRUS NONREIN XL W/TWL: Brand: MEDLINE INDUSTRIES, INC.

## (undated) DEVICE — TIP APPL TOP 2 SPRY

## (undated) DEVICE — THORACIC CATHETER, STRAIGHT, SILICONE, WITH CLOTSTOP®: Brand: AXIOM® ATRAUM™ WITH CLOTSTOP®

## (undated) DEVICE — INTENDED TO BE USED TO OCCLUDE, RETRACT AND IDENTIFY ARTERIES, VEINS, TENDONS AND NERVES IN SURGICAL PROCEDURES: Brand: STERION®  VESSEL LOOP

## (undated) DEVICE — SUTURE VCRL SZ 0 L27IN ABSRB UD L36MM CT-1 1/2 CIR J260H

## (undated) DEVICE — SHEET,DRAPE,53X77,STERILE: Brand: MEDLINE

## (undated) DEVICE — LEAD PACEMKR MYOCARDIAL UNIPOLAR TEMP

## (undated) DEVICE — ADHESIVE SKIN CLSR 0.7ML TOP DERMBND ADV

## (undated) DEVICE — SUTURE VCRL + SZ 2-0 L36IN ABSRB UD L36MM CT-1 1/2 CIR VCP945H

## (undated) DEVICE — FOGARTY OCCLUSION CATHETER 5F 40CM: Brand: FOGARTY

## (undated) DEVICE — KIT BLWR MISTER 5P 15L W/ TBNG SET IRRIG MIST TO IMPROVE

## (undated) DEVICE — RETRACTOR VEIN LOOP

## (undated) DEVICE — CANNULA PERF L15IN DIA29FR VEN 3 STG THN WALL DSGN W  VENT

## (undated) DEVICE — SUTURE MCRYL + SZ 4-0 L18IN ABSRB UD L19MM PS-2 3/8 CIR MCP496G

## (undated) DEVICE — FOGARTY OCCLUSION CATHETER 4F 40CM: Brand: FOGARTY

## (undated) DEVICE — SUTURE MCRYL SZ 4-0 L18IN ABSRB UD L19MM PS-2 3/8 CIR PRIM Y496G

## (undated) DEVICE — TUBING PRSS MON L12IN PVC RIG NONEXPANDING M TO FEM CONN

## (undated) DEVICE — LEAD PACE 2.6FR L50CM UPLR TEMP ATR NONSTEROID ELUT PIN

## (undated) DEVICE — WAX SURG 2.5GM HEMSTAT BNE BEESWAX PARAFFIN ISO PALMITATE

## (undated) DEVICE — DEVICE CTRL FLOWSWITCH 24 PER BX